# Patient Record
Sex: FEMALE | Race: WHITE | NOT HISPANIC OR LATINO | Employment: OTHER | URBAN - METROPOLITAN AREA
[De-identification: names, ages, dates, MRNs, and addresses within clinical notes are randomized per-mention and may not be internally consistent; named-entity substitution may affect disease eponyms.]

---

## 2017-01-02 ENCOUNTER — THERAPY VISIT (OUTPATIENT)
Dept: PHYSICAL THERAPY | Facility: CLINIC | Age: 82
End: 2017-01-02
Payer: MEDICARE

## 2017-01-02 DIAGNOSIS — M54.50 CHRONIC BILATERAL LOW BACK PAIN WITHOUT SCIATICA: Primary | ICD-10-CM

## 2017-01-02 DIAGNOSIS — G89.29 CHRONIC BILATERAL LOW BACK PAIN WITHOUT SCIATICA: Primary | ICD-10-CM

## 2017-01-02 PROCEDURE — 97110 THERAPEUTIC EXERCISES: CPT | Mod: GP | Performed by: PHYSICAL THERAPIST

## 2017-01-02 PROCEDURE — 97112 NEUROMUSCULAR REEDUCATION: CPT | Mod: GP | Performed by: PHYSICAL THERAPIST

## 2017-01-16 ENCOUNTER — THERAPY VISIT (OUTPATIENT)
Dept: PHYSICAL THERAPY | Facility: CLINIC | Age: 82
End: 2017-01-16
Payer: MEDICARE

## 2017-01-16 DIAGNOSIS — G89.29 CHRONIC BILATERAL LOW BACK PAIN WITHOUT SCIATICA: Primary | ICD-10-CM

## 2017-01-16 DIAGNOSIS — M54.50 CHRONIC BILATERAL LOW BACK PAIN WITHOUT SCIATICA: Primary | ICD-10-CM

## 2017-01-16 PROCEDURE — 97112 NEUROMUSCULAR REEDUCATION: CPT | Mod: GP | Performed by: PHYSICAL THERAPIST

## 2017-01-16 PROCEDURE — 97530 THERAPEUTIC ACTIVITIES: CPT | Mod: GP | Performed by: PHYSICAL THERAPIST

## 2017-01-20 ENCOUNTER — OFFICE VISIT (OUTPATIENT)
Dept: FAMILY MEDICINE | Facility: CLINIC | Age: 82
End: 2017-01-20
Payer: MEDICARE

## 2017-01-20 VITALS
DIASTOLIC BLOOD PRESSURE: 64 MMHG | BODY MASS INDEX: 34.85 KG/M2 | TEMPERATURE: 98.3 F | HEART RATE: 86 BPM | HEIGHT: 58 IN | OXYGEN SATURATION: 98 % | SYSTOLIC BLOOD PRESSURE: 122 MMHG | RESPIRATION RATE: 16 BRPM | WEIGHT: 166 LBS

## 2017-01-20 DIAGNOSIS — N30.00 ACUTE CYSTITIS WITHOUT HEMATURIA: Primary | ICD-10-CM

## 2017-01-20 LAB
ALBUMIN UR-MCNC: NEGATIVE MG/DL
APPEARANCE UR: ABNORMAL
BACTERIA #/AREA URNS HPF: ABNORMAL /HPF
BILIRUB UR QL STRIP: NEGATIVE
COLOR UR AUTO: YELLOW
GLUCOSE UR STRIP-MCNC: NEGATIVE MG/DL
HGB UR QL STRIP: ABNORMAL
KETONES UR STRIP-MCNC: NEGATIVE MG/DL
LEUKOCYTE ESTERASE UR QL STRIP: ABNORMAL
NITRATE UR QL: NEGATIVE
NON-SQ EPI CELLS #/AREA URNS LPF: ABNORMAL /LPF
PH UR STRIP: 5.5 PH (ref 5–7)
RBC #/AREA URNS AUTO: ABNORMAL /HPF (ref 0–2)
SP GR UR STRIP: 1.01 (ref 1–1.03)
URN SPEC COLLECT METH UR: ABNORMAL
UROBILINOGEN UR STRIP-ACNC: 0.2 EU/DL (ref 0.2–1)
WBC #/AREA URNS AUTO: ABNORMAL /HPF (ref 0–2)

## 2017-01-20 PROCEDURE — 87186 SC STD MICRODIL/AGAR DIL: CPT | Performed by: PHYSICIAN ASSISTANT

## 2017-01-20 PROCEDURE — 87088 URINE BACTERIA CULTURE: CPT | Performed by: PHYSICIAN ASSISTANT

## 2017-01-20 PROCEDURE — 81001 URINALYSIS AUTO W/SCOPE: CPT | Performed by: PHYSICIAN ASSISTANT

## 2017-01-20 PROCEDURE — 87086 URINE CULTURE/COLONY COUNT: CPT | Performed by: PHYSICIAN ASSISTANT

## 2017-01-20 PROCEDURE — 99213 OFFICE O/P EST LOW 20 MIN: CPT | Performed by: PHYSICIAN ASSISTANT

## 2017-01-20 RX ORDER — SULFAMETHOXAZOLE/TRIMETHOPRIM 800-160 MG
1 TABLET ORAL 2 TIMES DAILY
Qty: 20 TABLET | Refills: 0 | Status: SHIPPED
Start: 2017-01-20 | End: 2017-08-14

## 2017-01-20 NOTE — MR AVS SNAPSHOT
After Visit Summary   1/20/2017    Dimple Oviedo    MRN: [id number]           Patient Information     Date Of Birth          6/23/1933        Visit Information        Provider Department      1/20/2017 1:50 PM Ambar Browning PA-C [facility]        Today's Diagnoses     Urinary frequency    -  1     Acute cystitis without hematuria            Follow-ups after your visit        Your next 10 appointments already scheduled     Jan 30, 2017  2:00 PM   [facility] with Dalila Giordano PT   [place] [facility] ([facility]  )    [address] [address] [phone]              Who to contact     If you have questions or need follow up information about today's clinic visit or your schedule please contact [facility] directly at [phone].  Normal or non-critical lab and imaging results will be communicated to you by MyChart, letter or phone within 4 business days after the clinic has received the results. If you do not hear from us within 7 days, please contact the clinic through [employer] or phone. If you have a critical or abnormal lab result, we will notify you by phone as soon as possible.  Submit refill requests through [employer] or call your pharmacy and they will forward the refill request to us. Please allow 3 business days for your refill to be completed.          Additional Information About Your Visit        MyChart Information     [employer] gives you secure access to your electronic health record. If you see a primary care provider, you can also send messages to your care team and make appointments. If you have questions, please call your primary care clinic.  If you do not have a primary care provider, please call [phone] and they will assist you.        Care EveryWhere ID     This is your Care EveryWhere ID. This could be used by other organizations to access your [facility]  "records  [id number]        Your Vitals Were     Pulse Temperature Respirations Height BMI (Body Mass Index) Pulse Oximetry    86 98.3  F (36.8  C) (Oral) 16 4' 10\" (1.473 m) 34.70 kg/m2 98%       Blood Pressure from Last 3 Encounters:   01/20/17 122/64   11/30/16 129/53   11/09/16 138/70    Weight from Last 3 Encounters:   01/20/17 166 lb (75.297 kg)   11/09/16 167 lb 8 oz (75.978 kg)   11/02/16 164 lb (74.39 kg)              We Performed the Following     *UA reflex to Microscopic and Culture ([facility] (except [place] and [place])     Urine Microscopic          Today's Medication Changes          These changes are accurate as of: 1/20/17  2:23 PM.  If you have any questions, ask your nurse or doctor.               These medicines have changed or have updated prescriptions.        Dose/Directions    aspirin 81 MG tablet   This may have changed:  See the new instructions.   Used for:  Routine medical exam        ONE DAILY   Quantity:  100   Refills:  3                Primary Care Provider Office Phone # Fax #    Pop Antonino Zapien [phone]       [place] [phone]        Thank you!     Thank you for choosing [facility]  for your care. Our goal is always to provide you with excellent care. Hearing back from our patients is one way we can continue to improve our services. Please take a few minutes to complete the written survey that you may receive in the mail after your visit with us. Thank you!             Your Updated Medication List - Protect others around you: Learn how to safely use, store and throw away your medicines at www.disposemymeds.org.          This list is accurate as of: 1/20/17  2:23 PM.  Always use your most recent med list.                   Brand Name Dispense Instructions for use    acetaminophen 650 MG 8 hour tablet     100 tablet    Take 650 mg by mouth every 8 hours as " needed for mild pain or fever       alendronate 70 MG tablet    FOSAMAX    13 tablet    Take 1 tablet (70 mg) by mouth every 7 days Take with over 8 ounces water and stay upright for at least 30 minutes after dose.  Take at least 60 minutes before breakfast       aspirin 81 MG tablet     100    ONE DAILY       CALCIUM 500 + D 500-200 MG-IU Tabs      1 tablets 3 times daily       Fish Oil 500 MG Caps      Take 1 capsule by mouth 3 times daily       irbesartan 300 MG tablet    AVAPRO    90 tablet    Take 1 tablet (300 mg) by mouth daily       lovastatin 40 MG tablet    MEVACOR    90 tablet    Take 1 tablet (40 mg) by mouth At Bedtime       melatonin 3 MG tablet     90 tablet    Take 1 tablet (3 mg) by mouth nightly as needed for sleep       omeprazole 20 MG CR capsule    priLOSEC    180 capsule    Take 1 capsule (20 mg) by mouth daily Profile Rx: patient will contact pharmacy when needed       polyethylene glycol Packet    MIRALAX/GLYCOLAX    7 packet    Take 17 g by mouth 2 times daily as needed for constipation       prednisoLONE acetate 1 % ophthalmic susp    PRED FORTE     Place 1 drop into both eyes 2 times daily       RESTASIS 0.05 % ophthalmic emulsion   Generic drug:  cycloSPORINE      Place 1 drop into both eyes 2 times daily

## 2017-01-20 NOTE — PROGRESS NOTES
SUBJECTIVE:                                                    Dimple Oviedo is a 83 year old female who presents to clinic today for the following health issues:      URINARY TRACT SYMPTOMS      Duration: LAST NIGHT    Description  frequency, odor and urgency    Intensity:  mild    Accompanying signs and symptoms:  Fever/chills: no   Flank pain no   Nausea and vomiting: no   Vaginal symptoms: odor and itching  Abdominal/Pelvic Pain: no     History  History of frequent UTI's: YES- usually once per year  History of kidney stones: YES- about 10 years ago  Sexually Active: no   Possibility of pregnancy: No    Precipitating or alleviating factors: None    Therapies tried and outcome: increase fluid intake   Outcome: says she feels a little better         Problem list and histories reviewed & adjusted, as indicated.  Additional history: as documented    Patient Active Problem List   Diagnosis     Esophageal reflux     restless leg     heat intolerance     Goiter     Disorder of bone and cartilage     Other psoriasis     perirectal cyst     Malignant melanoma of skin of trunk, except scrotum (H)     Undiagnosed cardiac murmurs     Nontoxic multinodular goiter     Hyperlipidemia LDL goal <130     Hypertension goal BP (blood pressure) < 140/90     Urinary incontinence     Osteoarthritis of left knee     Hip arthritis     Polymyalgia rheumatica (H)     High risk medication use     Shoulder pain     Sepsis (H)     Impaired fasting blood sugar     Chronic bilateral low back pain without sciatica     Past Surgical History   Procedure Laterality Date     Surgical history of -   1996     malignant melanoma     Surgical history of -   1968     thyroid nodule     Surgical history of -        D & C     C nonspecific procedure  2005     colonoscopy polyp repeat 2010     Laparoscopic cholecystectomy with cholangiograms N/A 11/1/2015     Procedure: LAPAROSCOPIC CHOLECYSTECTOMY WITH CHOLANGIOGRAMS;  Surgeon: Tonie Warren,  MD;  Location: [facility] OR     Endoscopic ultrasound lower gastrointestional tract (gi) N/A 10/30/2015     Procedure: ENDOSCOPIC ULTRASOUND LOWER GASTROINTESTIONAL TRACT (GI);  Surgeon: Daniel Jean-Baptiste MD;  Location: [facility] OR       Social History   Substance Use Topics     Smoking status: Never Smoker      Smokeless tobacco: Never Used     Alcohol Use: No      Comment: 6 times per year-one drink     Family History   Problem Relation Age of Onset     CANCER Father      dec - esophageal and laryngeal     HEART DISEASE Mother      Respiratory Mother      dec         Current Outpatient Prescriptions   Medication Sig Dispense Refill     alendronate (FOSAMAX) 70 MG tablet Take 1 tablet (70 mg) by mouth every 7 days Take with over 8 ounces water and stay upright for at least 30 minutes after dose.  Take at least 60 minutes before breakfast 13 tablet 0     irbesartan (AVAPRO) 300 MG tablet Take 1 tablet (300 mg) by mouth daily 90 tablet 1     omeprazole (PRILOSEC) 20 MG capsule Take 1 capsule (20 mg) by mouth daily Profile Rx: patient will contact pharmacy when needed 180 capsule 3     lovastatin (MEVACOR) 40 MG tablet Take 1 tablet (40 mg) by mouth At Bedtime 90 tablet 0     acetaminophen 650 MG TABS Take 650 mg by mouth every 8 hours as needed for mild pain or fever 100 tablet 0     polyethylene glycol (MIRALAX/GLYCOLAX) packet Take 17 g by mouth 2 times daily as needed for constipation 7 packet 0     Omega-3 Fatty Acids (FISH OIL) 500 MG CAPS Take 1 capsule by mouth 3 times daily       prednisoLONE acetate (PRED FORTE) 1 % ophthalmic suspension Place 1 drop into both eyes 2 times daily       melatonin 3 MG tablet Take 1 tablet (3 mg) by mouth nightly as needed for sleep 90 tablet 0     cycloSPORINE (RESTASIS) 0.05 % ophthalmic emulsion Place 1 drop into both eyes 2 times daily       ASPIRIN 81 MG OR TABS ONE DAILY (Patient taking differently: ONE DAILY at bedtime) 100 3     CALCIUM 500 + D 500-200 MG-IU OR TABS 1 tablets 3  "times daily  0     Allergies   Allergen Reactions     No Known Drug Allergies        ROS:  C: NEGATIVE for fever, chills, change in weight  INTEGUMENTARY/SKIN: NEGATIVE for worrisome rashes, moles or lesions  E/M: NEGATIVE for sore throat, ear or sinus problems  R: NEGATIVE for cough  CV: NEGATIVE for chest pain, palpitations or peripheral edema  GI: NEGATIVE for nausea, abdominal pain, heartburn, or change in bowel habits  : POSITIVE for frequency and odor  MUSCULOSKELETAL: POSITIVE for back ache  NEURO: NEGATIVE for weakness, dizziness or paresthesias  ENDOCRINE: NEGATIVE for cold intolerance, HX diabetes and HX thyroid disease  HEME/ALLERGY/IMMUNE: NEGATIVE for swollen nodes      OBJECTIVE:                                                    /64 mmHg  Pulse 86  Temp(Src) 98.3  F (36.8  C) (Oral)  Resp 16  Ht 4' 10\" (1.473 m)  Wt 166 lb (75.297 kg)  BMI 34.70 kg/m2  SpO2 98%  Body mass index is 34.7 kg/(m^2).  GENERAL: healthy, alert and no distress  RESP: lungs clear to auscultation - no rales, rhonchi or wheezes  CV: regular rate and rhythm, normal S1 S2, no S3 or S4, no murmur, click or rub, no peripheral edema and peripheral pulses strong  ABDOMEN: soft, nontender, no hepatosplenomegaly, no masses and bowel sounds normal  MS: no gross musculoskeletal defects noted, no edema  BACK: no CVA tenderness, no paralumbar tenderness    Diagnostic Test Results:  Results for orders placed or performed in visit on 01/20/17 (from the past 24 hour(s))   *UA reflex to Microscopic and Culture ([facility] (except [place] and [place])   Result Value Ref Range    Color Urine Yellow     Appearance Urine Cloudy     Glucose Urine Negative NEG mg/dL    Bilirubin Urine Negative NEG    Ketones Urine Negative NEG mg/dL    Specific Gravity Urine 1.010 1.003 - 1.035    Blood Urine Trace (A) NEG    pH Urine 5.5 5.0 - 7.0 pH    Protein Albumin Urine Negative NEG mg/dL    Urobilinogen Urine 0.2 " 0.2 - 1.0 EU/dL    Nitrite Urine Negative NEG    Leukocyte Esterase Urine Moderate (A) NEG    Source Midstream Urine    Urine Microscopic   Result Value Ref Range    WBC Urine 25-50 (A) 0 - 2 /HPF    RBC Urine 5-10 (A) 0 - 2 /HPF    Squamous Epithelial /LPF Urine Few FEW /LPF    Bacteria Urine Few (A) NEG /HPF          ASSESSMENT/PLAN:                                                    1. Acute cystitis without hematuria  Push fluids and rest. Avoid caffeine and alcohol. Spoke about danger signs and when she should RTC.  - *UA reflex to Microscopic and Culture ([facility] (except [place] and [place])  - sulfamethoxazole-trimethoprim (BACTRIM DS/SEPTRA DS) 800-160 MG per tablet; Take 1 tablet by mouth 2 times daily for 10 days  Dispense: 20 tablet; Refill: 0  - Urine Microscopic  - Urine Culture Aerobic Bacterial    Options for treatment and follow up care were discussed with the patient. Patient participated in decision making and agrees with treatment plan.     Ambar Browning PACongC  [facility]

## 2017-01-20 NOTE — NURSING NOTE
"Chief Complaint   Patient presents with     UTI       Initial /64 mmHg  Pulse 86  Temp(Src) 98.3  F (36.8  C) (Oral)  Resp 16  Ht 4' 10\" (1.473 m)  Wt 166 lb (75.297 kg)  BMI 34.70 kg/m2  SpO2 98% Estimated body mass index is 34.7 kg/(m^2) as calculated from the following:    Height as of this encounter: 4' 10\" (1.473 m).    Weight as of this encounter: 166 lb (75.297 kg).  BP completed using cuff size: jovan Lares CMA      "

## 2017-01-21 ENCOUNTER — TELEPHONE (OUTPATIENT)
Dept: URGENT CARE | Facility: URGENT CARE | Age: 82
End: 2017-01-21

## 2017-01-21 NOTE — TELEPHONE ENCOUNTER
Pt called clinic c/o insomnia, restless legs, nausea, and chills.  Pt seems to think this is caused by the Bactrim she was prescribed for UTI on 1/20/2017.  Pt is requesting med change.  Provider notified and suggests that pt wait for culture results to come in before changing medication.  Pt understands and will call clinic for culture results tomorrow after 3 pm.  Tory Cevallos/ MA

## 2017-01-23 LAB
BACTERIA SPEC CULT: ABNORMAL
MICRO REPORT STATUS: ABNORMAL
MICROORGANISM SPEC CULT: ABNORMAL
SPECIMEN SOURCE: ABNORMAL

## 2017-01-24 ENCOUNTER — TRANSFERRED RECORDS (OUTPATIENT)
Dept: HEALTH INFORMATION MANAGEMENT | Facility: CLINIC | Age: 82
End: 2017-01-24

## 2017-01-24 LAB
ALT SERPL-CCNC: 19 U/L (ref 5–35)
AST SERPL-CCNC: 18 U/L (ref 5–34)
CREAT SERPL-MCNC: 0.92 MG/DL (ref 0.5–1.3)
GFR SERPL CREATININE-BSD FRML MDRD: 62 ML/MIN/1.73M2

## 2017-01-30 ENCOUNTER — THERAPY VISIT (OUTPATIENT)
Dept: PHYSICAL THERAPY | Facility: CLINIC | Age: 82
End: 2017-01-30
Payer: MEDICARE

## 2017-01-30 DIAGNOSIS — M54.50 CHRONIC BILATERAL LOW BACK PAIN WITHOUT SCIATICA: Primary | ICD-10-CM

## 2017-01-30 DIAGNOSIS — G89.29 CHRONIC BILATERAL LOW BACK PAIN WITHOUT SCIATICA: Primary | ICD-10-CM

## 2017-01-30 PROCEDURE — 97110 THERAPEUTIC EXERCISES: CPT | Mod: GP | Performed by: PHYSICAL THERAPIST

## 2017-01-30 PROCEDURE — 97530 THERAPEUTIC ACTIVITIES: CPT | Mod: GP | Performed by: PHYSICAL THERAPIST

## 2017-01-30 PROCEDURE — 97112 NEUROMUSCULAR REEDUCATION: CPT | Mod: GP | Performed by: PHYSICAL THERAPIST

## 2017-02-01 DIAGNOSIS — E78.5 HYPERLIPIDEMIA LDL GOAL <130: Primary | ICD-10-CM

## 2017-02-01 RX ORDER — LOVASTATIN 40 MG
40 TABLET ORAL AT BEDTIME
Qty: 90 TABLET | Refills: 2 | Status: SHIPPED
Start: 2017-02-01 | End: 2017-08-31

## 2017-02-01 NOTE — TELEPHONE ENCOUNTER
lovastatin     Last Written Prescription Date: 8/15/16  Last Fill Quantity: 90, # refills: 0  Last Office Visit with [facility], [facility] or [facility] prescribing provider: 1/20/17       CHOL      162   8/29/2016  HDL       52   8/29/2016  LDL       88   8/29/2016  TRIG      109   8/29/2016  CHOLHDLRATIO      2.5   4/28/2015

## 2017-02-02 NOTE — TELEPHONE ENCOUNTER
Prescription approved per [facility] Refill Protocol.    Signed Prescriptions:                        Disp   Refills    lovastatin (MEVACOR) 40 MG tablet          90 tab*2        Sig: Take 1 tablet (40 mg) by mouth At Bedtime  Authorizing Provider: BLAYNE MORENO  Ordering User: RADHA BUSBY      Closing encounter - no further actions needed at this time    Radha Busby RN

## 2017-02-16 ENCOUNTER — THERAPY VISIT (OUTPATIENT)
Dept: PHYSICAL THERAPY | Facility: CLINIC | Age: 82
End: 2017-02-16
Payer: MEDICARE

## 2017-02-16 DIAGNOSIS — G89.29 CHRONIC BILATERAL LOW BACK PAIN WITHOUT SCIATICA: ICD-10-CM

## 2017-02-16 DIAGNOSIS — M54.50 CHRONIC BILATERAL LOW BACK PAIN WITHOUT SCIATICA: ICD-10-CM

## 2017-02-16 PROCEDURE — G8979 MOBILITY GOAL STATUS: HCPCS | Mod: GP | Performed by: PHYSICAL THERAPIST

## 2017-02-16 PROCEDURE — 97530 THERAPEUTIC ACTIVITIES: CPT | Mod: GP | Performed by: PHYSICAL THERAPIST

## 2017-02-16 PROCEDURE — 97112 NEUROMUSCULAR REEDUCATION: CPT | Mod: GP | Performed by: PHYSICAL THERAPIST

## 2017-02-16 PROCEDURE — G8978 MOBILITY CURRENT STATUS: HCPCS | Mod: GP | Performed by: PHYSICAL THERAPIST

## 2017-02-16 NOTE — MR AVS SNAPSHOT
After Visit Summary   2/16/2017    Dimple Oviedo    MRN: [id number]           Patient Information     Date Of Birth          6/23/1933        Visit Information        Provider Department      2/16/2017 12:40 PM Dalila Giordano PT [facility]        Today's Diagnoses     Chronic bilateral low back pain without sciatica           Follow-ups after your visit        Your next 10 appointments already scheduled     Feb 22, 2017  1:40 PM CST   Office Visit with Pop Zapien MD   [facility] ([facility])    [address] [place] [address] [phone]           Bring a current list of meds and any records pertaining to this visit.  For Physicals, please bring immunization records and any forms needing to be filled out.  Please arrive 10 minutes early to complete paperwork.              Who to contact     If you have questions or need follow up information about today's clinic visit or your schedule please contact [facility] directly at [phone].  Normal or non-critical lab and imaging results will be communicated to you by [employer], letter or phone within 4 business days after the clinic has received the results. If you do not hear from us within 7 days, please contact the clinic through [email or web address] or phone. If you have a critical or abnormal lab result, we will notify you by phone as soon as possible.  Submit refill requests through [employer] or call your pharmacy and they will forward the refill request to us. Please allow 3 business days for your refill to be completed.          Additional Information About Your Visit        MyChart Information     [employer] gives you secure access to your electronic health record. If you see a primary care provider, you can also send messages to your care team and make appointments. If you have questions, please  call your primary care clinic.  If you do not have a primary care provider, please call [phone] and they will assist you.        Care EveryWhere ID     This is your Care EveryWhere ID. This could be used by other organizations to access your [facility] medical records  [id number]         Blood Pressure from Last 3 Encounters:   01/20/17 122/64   11/30/16 129/53   11/09/16 138/70    Weight from Last 3 Encounters:   01/20/17 75.3 kg (166 lb)   11/09/16 76 kg (167 lb 8 oz)   11/02/16 74.4 kg (164 lb)              We Performed the Following     [facility] Progress Notes Report     Neuromuscular Re-Education     Therapeutic Activities          Today's Medication Changes          These changes are accurate as of: 2/16/17 11:59 PM.  If you have any questions, ask your nurse or doctor.               These medicines have changed or have updated prescriptions.        Dose/Directions    aspirin 81 MG tablet   This may have changed:  See the new instructions.   Used for:  Routine medical exam        ONE DAILY   Quantity:  100   Refills:  3                Primary Care Provider Office Phone # Fax #    Pop Antonino Zapien [phone]       [address] [address]        Thank you!     Thank you for choosing [facility]  for your care. Our goal is always to provide you with excellent care. Hearing back from our patients is one way we can continue to improve our services. Please take a few minutes to complete the written survey that you may receive in the mail after your visit with us. Thank you!             Your Updated Medication List - Protect others around you: Learn how to safely use, store and throw away your medicines at www.disposemymeds.org.          This list is accurate as of: 2/16/17 11:59 PM.  Always use your most recent med list.                   Brand Name Dispense Instructions for use    acetaminophen  650 MG 8 hour tablet     100 tablet    Take 650 mg by mouth every 8 hours as needed for mild pain or fever       alendronate 70 MG tablet    FOSAMAX    13 tablet    Take 1 tablet (70 mg) by mouth every 7 days Take with over 8 ounces water and stay upright for at least 30 minutes after dose.  Take at least 60 minutes before breakfast       aspirin 81 MG tablet     100    ONE DAILY       CALCIUM 500 + D 500-200 MG-IU Tabs      1 tablets 3 times daily       Fish Oil 500 MG Caps      Take 1 capsule by mouth 3 times daily       irbesartan 300 MG tablet    AVAPRO    90 tablet    Take 1 tablet (300 mg) by mouth daily       lovastatin 40 MG tablet    MEVACOR    90 tablet    Take 1 tablet (40 mg) by mouth At Bedtime       melatonin 3 MG tablet     90 tablet    Take 1 tablet (3 mg) by mouth nightly as needed for sleep       omeprazole 20 MG CR capsule    priLOSEC    180 capsule    Take 1 capsule (20 mg) by mouth daily Profile Rx: patient will contact pharmacy when needed       polyethylene glycol Packet    MIRALAX/GLYCOLAX    7 packet    Take 17 g by mouth 2 times daily as needed for constipation       prednisoLONE acetate 1 % ophthalmic susp    PRED FORTE     Place 1 drop into both eyes 2 times daily       RESTASIS 0.05 % ophthalmic emulsion   Generic drug:  cycloSPORINE      Place 1 drop into both eyes 2 times daily

## 2017-02-19 NOTE — PROGRESS NOTES
Subjective:    HPI                    Objective:    System    Physical Exam    General     ROS    Assessment/Plan:      PROGRESS  REPORT    Progress reporting period is from 12/5/16 to 2/16/17.       SUBJECTIVE  Subjective: Was not dx with lupus as was earlier discussed, but there does seem to be something going on autoimmune wise. She will follow-up with the rheumatologist in about a month. Pt notes she has good days and bad days and does not know why. After feeling good for a few days, she walked much more than she has been and then was sore the following day. She continues to be limited with her walking distance, but the pain resolves quickly when she sits down. The exercises help her loosen up and be less painful in the morning. She has tried implementing some of the core stability strategies from PT into her daily life, but she notes that it is difficult to do. Overall, she feels better since starting PT and thinks it has been quite helpful, but she also still feels painful and limited with standing and walking.   (Pt notes the lateral R hip pain has been better. )    Current Pain level: 0/10 (while sitting. ).     Initial Pain level: 8/10.   Changes in function:  Yes (See Goal flowsheet attached for changes in current functional level)  Adverse reaction to treatment or activity: None    OBJECTIVE  Changes noted in objective findings:  Yes, see below.   Objective: Improved range of motion and core engagement. Pt requires cues for proper form with posterior pelvic tilt. Incr time and education on implementing exercise strategies into ADLs. Pt will follow up with referring MD and then return to PT for final session.  (THI improved from 31 on 12/5/16 to 22 on 1/30/17. )     ASSESSMENT/PLAN  Updated problem list and treatment plan: Diagnosis 1:  LBP - L4-L5 stenosis per MRI  Pain -  hot/cold therapy, manual therapy, self management, education and home program  Decreased ROM/flexibility - manual therapy, therapeutic  exercise and home program  Decreased joint mobility - manual therapy, therapeutic exercise and home program  Decreased strength - therapeutic exercise, therapeutic activities and home program  Decreased function - therapeutic activities and home program  Impaired posture - neuro re-education and home program  STG/LTGs have been met or progress has been made towards goals:  Yes (See Goal flow sheet completed today.)  Assessment of Progress: The patient's condition is improving.  Patient is meeting short term goals and is progressing towards long term goals.  Self Management Plans:  Patient has been instructed in a home treatment program.  Patient  has been instructed in self management of symptoms.  I have re-evaluated this patient and find that the nature, scope, duration and intensity of the therapy is appropriate for the medical condition of the patient.  Dimple continues to require the following intervention to meet STG and LTG's:  PT    Recommendations:  This patient would benefit from continued therapy.   Pt will follow up with referring MD and then return to PT for the final visit.   Frequency:  1 X a month, once daily  Duration:  for 1 months        Please refer to the daily flowsheet for treatment today, total treatment time and time spent performing 1:1 timed codes.

## 2017-02-22 ENCOUNTER — TELEPHONE (OUTPATIENT)
Dept: FAMILY MEDICINE | Facility: CLINIC | Age: 82
End: 2017-02-22

## 2017-02-22 ENCOUNTER — OFFICE VISIT (OUTPATIENT)
Dept: FAMILY MEDICINE | Facility: CLINIC | Age: 82
End: 2017-02-22
Payer: MEDICARE

## 2017-02-22 VITALS
OXYGEN SATURATION: 96 % | BODY MASS INDEX: 35.27 KG/M2 | WEIGHT: 168.75 LBS | HEART RATE: 70 BPM | DIASTOLIC BLOOD PRESSURE: 68 MMHG | TEMPERATURE: 97.7 F | SYSTOLIC BLOOD PRESSURE: 122 MMHG

## 2017-02-22 DIAGNOSIS — G89.29 CHRONIC BILATERAL LOW BACK PAIN WITHOUT SCIATICA: ICD-10-CM

## 2017-02-22 DIAGNOSIS — M54.50 CHRONIC BILATERAL LOW BACK PAIN WITHOUT SCIATICA: ICD-10-CM

## 2017-02-22 DIAGNOSIS — E66.9 OBESITY, UNSPECIFIED OBESITY SEVERITY, UNSPECIFIED OBESITY TYPE: Primary | ICD-10-CM

## 2017-02-22 PROCEDURE — 99214 OFFICE O/P EST MOD 30 MIN: CPT | Performed by: FAMILY MEDICINE

## 2017-02-22 ASSESSMENT — ANXIETY QUESTIONNAIRES
IF YOU CHECKED OFF ANY PROBLEMS ON THIS QUESTIONNAIRE, HOW DIFFICULT HAVE THESE PROBLEMS MADE IT FOR YOU TO DO YOUR WORK, TAKE CARE OF THINGS AT HOME, OR GET ALONG WITH OTHER PEOPLE: NOT DIFFICULT AT ALL
7. FEELING AFRAID AS IF SOMETHING AWFUL MIGHT HAPPEN: NOT AT ALL
GAD7 TOTAL SCORE: 1
6. BECOMING EASILY ANNOYED OR IRRITABLE: NOT AT ALL
2. NOT BEING ABLE TO STOP OR CONTROL WORRYING: SEVERAL DAYS
5. BEING SO RESTLESS THAT IT IS HARD TO SIT STILL: NOT AT ALL
1. FEELING NERVOUS, ANXIOUS, OR ON EDGE: NOT AT ALL
3. WORRYING TOO MUCH ABOUT DIFFERENT THINGS: NOT AT ALL

## 2017-02-22 ASSESSMENT — PATIENT HEALTH QUESTIONNAIRE - PHQ9: 5. POOR APPETITE OR OVEREATING: NOT AT ALL

## 2017-02-22 NOTE — PROGRESS NOTES
SUBJECTIVE:                                                    Dimple Oviedo is a 83 year old female who presents to clinic today for the following health issues:     Musculoskeletal problem/pain      Duration: few years    Description  Location: back and hip    Intensity:  Comes and go. 5/10    Accompanying signs and symptoms: sharp pain comes and go; when walking or standing    History  Previous similar problem: no   Previous evaluation:  MRI    Precipitating or alleviating factors:  Trauma or overuse: YES  Aggravating factors include: sitting and walking    Therapies tried and outcome: heat and acetaminophen and therapy.       Seen by spine specialist and also rheumatologist. Wondering if she needs to see them both.  Had jasmine abnormal labs with rheumatologist and she is going to see them for follow up but not for next few months.    Regarding pain - Physical therapist and patient noticed worsening of pain with sugar intake. Patient is not quite sure about association though.  She admits she is sometime not compliant with diet. She has done weight weight watchers for her life.     Problem list and histories reviewed & adjusted, as indicated.  Additional history: as documented    Problem list, Medication list, Allergies, and Medical/Social/Surgical histories reviewed in EPIC and updated as appropriate.      Social History     Social History     Marital status:      Spouse name:      Number of children: 2     Years of education: N/A     Occupational History      Retired     Social History Main Topics     Smoking status: Never Smoker     Smokeless tobacco: Never Used     Alcohol use No      Comment: 6 times per year-one drink     Drug use: No     Sexual activity: Yes     Partners: Male     Other Topics Concern      Service No     Blood Transfusions No     Exercise Yes     curves     Seat Belt Yes     Self-Exams Yes     Parent/Sibling W/ Cabg, Mi Or Angioplasty Before 65f 55m? No     Social  History Narrative    December 7, 2009    Balanced Diet - Yes    Osteoporosis Prevention Measures - Dairy servings per day: 2 and Medication/Supplements (See current meds)    Regular Exercise -  Yes Describe curves, walking    Dental Exam - YES - Date: 10/2009    Eye Exam - YES - Date: 2008    Self Breast Exam - Yes    Abuse: Current or Past (Physical, Sexual or Emotional)- No    Do you feel safe in your environment - Yes    Guns stored in the home - No    Sunscreen used - Yes    Seatbelts used - Yes    Lipids -  YES - Date: 11/24/2009    Glucose -  YES - Date: 11/24/2009    Colon Cancer Screening - Colonoscopy 11/18/2005(date completed)    Hemoccults - YES - Date: 10/12/2004    Pap Test -  YES - Date: 09/22/2004    Do you have any concerns about STD's -  No    Mammography - YES - Date: 11/24/2009    DEXA - YES - Date: 11/20/2008    Immunizations reviewed and up to date - Yes    PAULETTE Spencer CMA                 Allergies   Allergen Reactions     No Known Drug Allergies      Patient Active Problem List   Diagnosis     Esophageal reflux     restless leg     heat intolerance     Goiter     Disorder of bone and cartilage     Other psoriasis     perirectal cyst     Malignant melanoma of skin of trunk, except scrotum (H)     Undiagnosed cardiac murmurs     Nontoxic multinodular goiter     Hyperlipidemia LDL goal <130     Hypertension goal BP (blood pressure) < 140/90     Urinary incontinence     Osteoarthritis of left knee     Hip arthritis     Polymyalgia rheumatica (H)     High risk medication use     Shoulder pain     Sepsis (H)     Impaired fasting blood sugar     Chronic bilateral low back pain without sciatica     Obesity, unspecified obesity severity, unspecified obesity type     Reviewed medications, social history and  past medical and surgical history.    Review of system: for general, respiratory, CVS, GI and psychiatry negative except for noted above.     EXAM:  /68 (BP Location: Left arm, Patient Position:  Chair, Cuff Size: Adult Regular)  Pulse 70  Temp 97.7  F (36.5  C) (Oral)  Wt 168 lb 12 oz (76.5 kg)  SpO2 96%  BMI 35.27 kg/m2  Constitutional: healthy, alert and no distress   Psychiatric: mentation appears normal and affect normal/bright     ASSESSMENT / PLAN:  (E66.9) Obesity, unspecified obesity severity, unspecified obesity type  (primary encounter diagnosis)  Comment: Body mass index is 35.27 kg/(m^2). certainly would benefit from seeing a dietician. I got a staff message from physical therapist about worsening of her back pain and sugar intake and physical therapist reccommended nutrition referral too. Referral signed. She is not quit sure if she is going to follow up on that but will keep referral handy.  Plan: NUTRITION REFERRAL             (M54.5,  G89.29) Chronic bilateral low back pain without sciatica  Comment:  See above. Seeing spine specialist and rheumatologist. Follow up scheduled.    Plan: NUTRITION REFERRAL

## 2017-02-22 NOTE — NURSING NOTE
"Chief Complaint   Patient presents with     Arthritis       Initial /68 (BP Location: Left arm, Patient Position: Chair, Cuff Size: Adult Regular)  Pulse 70  Temp 97.7  F (36.5  C) (Oral)  Wt 168 lb 12 oz (76.5 kg)  SpO2 96%  BMI 35.27 kg/m2 Estimated body mass index is 35.27 kg/(m^2) as calculated from the following:    Height as of 1/20/17: 4' 10\" (1.473 m).    Weight as of this encounter: 168 lb 12 oz (76.5 kg).  Medication Reconciliation: complete   Joel Zurita MA    "

## 2017-02-22 NOTE — MR AVS SNAPSHOT
After Visit Summary   2/22/2017    Dimple Oviedo    MRN: [id number]           Patient Information     Date Of Birth          6/23/1933        Visit Information        Provider Department      2/22/2017 1:40 PM Pop Zapien MD [facility]        Today's Diagnoses     Obesity, unspecified obesity severity, unspecified obesity type    -  1    Chronic bilateral low back pain without sciatica           Follow-ups after your visit        Additional Services     NUTRITION REFERRAL       Your provider has referred you to: [facility]: [facility] (on call location)  [facility] [phone]   [email or web address]    Please be aware that coverage of these services is subject to the terms and limitations of your health insurance plan.  Call member services at your health plan with any benefit or coverage questions.      Please bring the following with you to your appointment:    (1) This referral request  (2) Any documents given to you regarding this referral  (3) Any specific questions you have about diet and/or food choices                  Who to contact     If you have questions or need follow up information about today's clinic visit or your schedule please contact [facility] directly at [phone].  Normal or non-critical lab and imaging results will be communicated to you by MyChart, letter or phone within 4 business days after the clinic has received the results. If you do not hear from us within 7 days, please contact the clinic through MyChart or phone. If you have a critical or abnormal lab result, we will notify you by phone as soon as possible.  Submit refill requests through [employer] or call your pharmacy and they will forward the refill request to us. Please allow 3 business days for your refill to be completed.          Additional Information About Your Visit        MyChart Information     [employer] gives you secure  access to your electronic health record. If you see a primary care provider, you can also send messages to your care team and make appointments. If you have questions, please call your primary care clinic.  If you do not have a primary care provider, please call [phone] and they will assist you.        Care EveryWhere ID     This is your Care EveryWhere ID. This could be used by other organizations to access your [place] medical records  [id number]        Your Vitals Were     Pulse Temperature Pulse Oximetry BMI (Body Mass Index)          70 97.7  F (36.5  C) (Oral) 96% 35.27 kg/m2         Blood Pressure from Last 3 Encounters:   02/22/17 122/68   01/20/17 122/64   11/30/16 129/53    Weight from Last 3 Encounters:   02/22/17 168 lb 12 oz (76.5 kg)   01/20/17 166 lb (75.3 kg)   11/09/16 167 lb 8 oz (76 kg)              We Performed the Following     NUTRITION REFERRAL          Today's Medication Changes          These changes are accurate as of: 2/22/17  2:09 PM.  If you have any questions, ask your nurse or doctor.               These medicines have changed or have updated prescriptions.        Dose/Directions    aspirin 81 MG tablet   This may have changed:  See the new instructions.   Used for:  Routine medical exam        ONE DAILY   Quantity:  100   Refills:  3                Primary Care Provider Office Phone # Fax #    Pop Antonino Zapien [phone]       [address] [address]        Thank you!     Thank you for choosing [facility]  for your care. Our goal is always to provide you with excellent care. Hearing back from our patients is one way we can continue to improve our services. Please take a few minutes to complete the written survey that you may receive in the mail after your visit with us. Thank you!             Your Updated Medication List - Protect others around you: Learn how to safely use, store and throw away  your medicines at www.disposemymeds.org.          This list is accurate as of: 2/22/17  2:09 PM.  Always use your most recent med list.                   Brand Name Dispense Instructions for use    acetaminophen 650 MG 8 hour tablet     100 tablet    Take 650 mg by mouth every 8 hours as needed for mild pain or fever       alendronate 70 MG tablet    FOSAMAX    13 tablet    Take 1 tablet (70 mg) by mouth every 7 days Take with over 8 ounces water and stay upright for at least 30 minutes after dose.  Take at least 60 minutes before breakfast       aspirin 81 MG tablet     100    ONE DAILY       CALCIUM 500 + D 500-200 MG-IU Tabs      1 tablets 3 times daily       Fish Oil 500 MG Caps      Take 1 capsule by mouth 3 times daily       irbesartan 300 MG tablet    AVAPRO    90 tablet    Take 1 tablet (300 mg) by mouth daily       lovastatin 40 MG tablet    MEVACOR    90 tablet    Take 1 tablet (40 mg) by mouth At Bedtime       melatonin 3 MG tablet     90 tablet    Take 1 tablet (3 mg) by mouth nightly as needed for sleep       omeprazole 20 MG CR capsule    priLOSEC    180 capsule    Take 1 capsule (20 mg) by mouth daily Profile Rx: patient will contact pharmacy when needed       polyethylene glycol Packet    MIRALAX/GLYCOLAX    7 packet    Take 17 g by mouth 2 times daily as needed for constipation       prednisoLONE acetate 1 % ophthalmic susp    PRED FORTE     Place 1 drop into both eyes 2 times daily       RESTASIS 0.05 % ophthalmic emulsion   Generic drug:  cycloSPORINE      Place 1 drop into both eyes 2 times daily

## 2017-02-22 NOTE — TELEPHONE ENCOUNTER
"----- Message from Shavon Bonilla MD sent at 2/22/2017 10:10 AM CST -----  Regarding: FW: Return vist  Please see recommendations from PT, below, for dietician referral. Please place this consultation order, if you feel appropriate.    ThanksShavon      ----- Message -----     From: Dalila Giordano, PT     Sent: 2/19/2017   5:07 PM       To: Shavon Bonilla MD  Subject: Return vist                                      Dr. Bonilla,     I have been working with Dimple in PT for 7 visits for her LBP. She has improved, but continues to have pain with standing and walking. Dimple and I have discussed that PT will not \"cure\" the stenosis, but she should continue the exercises to manage the pain and the importance of using the stability concepts with the exercises in her daily life. Her Oswestry score has improved from a 31 to a 22.  I have asked her to return to see you to discuss continued management strategies. On your initial order, you mentioned the potential for other medication or an injection. I would like to suggest the possibility of a consult with a dietician as well. I know Dimple likes her sweets and I think her sugar intake may be adding to pain flare-ups. Please refer to EPIC for a full progress note. Please let me know if you have any questions.     Thanks,     Dalila Giordano DPT  [facility]  [phone]    "

## 2017-02-23 ASSESSMENT — PATIENT HEALTH QUESTIONNAIRE - PHQ9: SUM OF ALL RESPONSES TO PHQ QUESTIONS 1-9: 2

## 2017-02-23 ASSESSMENT — ANXIETY QUESTIONNAIRES: GAD7 TOTAL SCORE: 1

## 2017-03-08 ENCOUNTER — OFFICE VISIT (OUTPATIENT)
Dept: PALLIATIVE MEDICINE | Facility: CLINIC | Age: 82
End: 2017-03-08
Payer: MEDICARE

## 2017-03-08 VITALS
HEART RATE: 70 BPM | SYSTOLIC BLOOD PRESSURE: 158 MMHG | DIASTOLIC BLOOD PRESSURE: 68 MMHG | BODY MASS INDEX: 35.11 KG/M2 | RESPIRATION RATE: 18 BRPM | WEIGHT: 168 LBS

## 2017-03-08 DIAGNOSIS — M54.50 CHRONIC LUMBOSACRAL PAIN: Primary | ICD-10-CM

## 2017-03-08 DIAGNOSIS — G89.29 CHRONIC LUMBOSACRAL PAIN: Primary | ICD-10-CM

## 2017-03-08 PROCEDURE — 99214 OFFICE O/P EST MOD 30 MIN: CPT | Performed by: PAIN MEDICINE

## 2017-03-08 ASSESSMENT — PAIN SCALES - GENERAL: PAINLEVEL: NO PAIN (1)

## 2017-03-08 NOTE — NURSING NOTE
"Chief Complaint   Patient presents with     Pain       Initial /68  Pulse 70  Resp 18  Wt 76.2 kg (168 lb)  BMI 35.11 kg/m2 Estimated body mass index is 35.11 kg/(m^2) as calculated from the following:    Height as of 1/20/17: 1.473 m (4' 10\").    Weight as of this encounter: 76.2 kg (168 lb).  Medication Reconciliation: complete       Mario Drew MA  [facility]      "

## 2017-03-08 NOTE — PATIENT INSTRUCTIONS
"PATIENT INSTRUCTIONS:  1. We discussed your recent clinic visit with your rheumatologist. Please complete follow up visit with your clinic.  2. Regarding use of over-the-counter medications:  A) May consider as needed use of \"NSAID\" medications - these include things like Advil (Ibuprofen) or Aleve (Naproxen).   For Advil (Ibuprofen), do not take more than 600-800 mg every 6-8 hours as needed. MAX 1800 mg per day. Take with food/beverage. This can cause some stomach irritation, effects on heart or kidneys.   For Aleve (Naproxen), do not take more than 500 mg every 12 hours as needed. MAX 1000 mg per day. Take with food/beverage. This can cause some stomach irritation, effects on heart or kidneys.  B) May consider as needed use of Tylenol (Acetaminophen).   For Tylenol (Acetaminophen), do not take more than 500-1000 mg every 4-6 hours as needed. MAX 2000 mg per day. This can cause effects on liver.   2. Continue your home therapy program.   3. We talked about possible spinal injection. You wanted to hold off on this right now, since your pain has been somewhat better controlled recently.   4. Return to clinic as needed. If you decide you would like injection, you can call the clinic and we can order this directly for you.   Thank you!  Scheduling number to the [facility]: [phone]. Call this number to schedule or change appointments.    Nurse Triage line: [phone]  Call this number with any questions or concerns. You can leave a message anytime. Please leave a detailed message. Calls are returned between 8 AM and 4 PM Monday through Thursday and from 8 AM to 12 Noon on Fridays. We usually get back to you within 24 to 48 hours depending on the issue/request.     We believe regular attendance is key to your success in our program.    Any time you are unable to keep your appointment we ask that you call us at least 24 hours in advance to let us know. This will allow us to offer the " appointment time to another patient.

## 2017-03-16 DIAGNOSIS — M85.80 OSTEOPENIA: ICD-10-CM

## 2017-03-16 DIAGNOSIS — Z79.899 HIGH RISK MEDICATION USE: ICD-10-CM

## 2017-03-16 DIAGNOSIS — I10 ESSENTIAL HYPERTENSION WITH GOAL BLOOD PRESSURE LESS THAN 140/90: ICD-10-CM

## 2017-03-16 RX ORDER — ALENDRONATE SODIUM 70 MG/1
TABLET ORAL
Qty: 12 TABLET | Refills: 1 | Status: SHIPPED | OUTPATIENT
Start: 2017-03-16 | End: 2017-09-27

## 2017-03-16 NOTE — TELEPHONE ENCOUNTER
alendronate (FOSAMAX) 70 MG tablet    Last Written Prescription Date: 11/19/16  Last Fill Quantity: 13, # refills: 0  Last Office Visit with [facility] or [facility] prescribing provider: 2/22/17       DEXA Scan:  Last order of DX HIP/PELVIS/SPINE was found on 11/10/2016 from Office Visit on 11/9/2016     Last order of DX HIP/PELVIS/SPINE W LAT FRACTION ANALYSIS was found on 11/15/2016 from [facility] Appointment on 11/15/2016       Creatinine   Date Value Ref Range Status   01/24/2017 0.920 0.500 - 1.300 mg/dL Final       irbesartan (AVAPRO) 300 MG tablet      Last Written Prescription Date: 11/8/16  Last Fill Quantity: 90, # refills: 1  Last Office Visit with [facility] or [facility] prescribing provider: 2/22/17       Potassium   Date Value Ref Range Status   11/09/2016 4.5 3.4 - 5.3 mmol/L Final     Creatinine   Date Value Ref Range Status   01/24/2017 0.920 0.500 - 1.300 mg/dL Final     BP Readings from Last 3 Encounters:   03/08/17 158/68   02/22/17 122/68   01/20/17 122/64

## 2017-03-16 NOTE — TELEPHONE ENCOUNTER
Routing refill request to provider for review/approval because:  BP elevated    Fosamax refilled according to [facility] refill protocol.    Routing to PCP.    Dr. Zapien-Please sign if agree.    Thank you!  ITALO Figueroa, SHASHANKN, RN

## 2017-03-17 RX ORDER — IRBESARTAN 300 MG/1
TABLET ORAL
Qty: 90 TABLET | Refills: 1 | Status: SHIPPED | OUTPATIENT
Start: 2017-03-17 | End: 2017-08-22

## 2017-03-23 NOTE — PROGRESS NOTES
"[facility]  Medical Spine Speciality - Follow-up Visit    Date of visit: 03/08/17    Primary Care Provider: Dr. Pop Zapien    Interval History: Dimple Oviedo is an 83-year-old female who returns to clinic for follow up of low back pain. Since her last clinic visit on 11/30/16, she reports that her pain is \"much better\" overall. She notes that her pain \"comes and goes\"; she notes that she is \"in a good part right now\". She is uncertain if this is just the natural course of her symptoms, or if this is related to anything she is doing for her pain. Today she describes a discomfort of the lumbosacral region, most pronounced in the morning, from 0/10 to 5/10 in intensity, worse with prolonged standing or walking (e.g. shopping). Her pain is improved with use of heat and Tylenol Arthritis. She reports use of Aleve PM to help her sleep better. She has had a cough and cold-like symptoms over the last week(s). She is not interested in additional medications at this time. She states that she held off on trial of topical agents. Her activity level is improved overall. She completed a course of physical therapy and continues to do exercises on her own; she \"suppose[s] it's helping\".    Of note, she completed consultation with Dr. Miles Lazo at [facility] on 01/24/17. Per Dr. 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 She does not have symptoms to strongly suggest an underlying autoimmune disease despite the positive CANDIS...recommendations to continue regular low-impact exercises, use good shoes inside and outside the home, try glucosamine/chondrite and for at least 2-3 months in adequate doses, and try increasing the Tylenol Arthritis to as much as 2 tablets twice daily as needed.\" She completed additional laboratory testing at that time. She states she was supposed to " "follow up in clinic regarding her results.     Past Pain History (from initial consultation, 16):  Dimple Oviedo is an 83-year-old female who presents for Medical Spine Clinic consultation at the referral of Dr. Pop Zapien for initial evaluation of chief complaint of low back pain.     She reports onset of pain around 2015, as she was at the airport [place] way to visit her daughter in [place] for the holidays. She states that she had set her food down and got up to walk a few feet to get something, when she experienced acute right gluteal pain as if \"lightening hit my hip...almost threw me to the floor\". She noted \"terrible pain\" as she attempted to get in and out of the car later on. She was seen at an urgent care center in [place], at which time x-ray was taken. She states that she was told that a \"muscle exploded\". She was given medications and advised to rest. She notes that her pain gradually improved over 4 months. She notes only occasional right gluteal pain, for example when lifting something.     Over the last month, she has noted pain across the lumbosacral region, equal on both sides. She describes an intermittent (daily) nagging and sharp pain, from 1/10 to 8/10 in intensity, worse with prolonged standing (e.g. standing for 30 minutes dishing out potatoes at a ), standing after a period of time sitting, or with lifting. Her pain is improved somewhat with use of heat. She has not tried ice. She denies radicular pain, numbness, or paresthesias. She reports sense of leg weakness, although denies foot drag/drop, tripping, or falls. She gets \"charley horses\" in the legs at night. She reports some swelling of the legs, which she notes is hereditary. She denies saddle anesthesia. She reports baseline urinary leakage, for which she wears a pad; she was recommended to undergo bladder sling for this. She denies bowel incontinence, although is constipated at baseline. Her " history is otherwise significant for arthritis of the hands and knees. She notes some hand tingling when doing a lot of embroidery, although she denies neck pain or upper extremity symptoms. She has a diagnosis of polymyalgia rheumatica, for which she is followed by Dr. Vincenzo Tovar. She has been treated with prednisone in the past, although states that her symptoms (shoulder/hip girdle weakness) have been improved/stable.     She reports some relief from use of Tylenol Arthritis 1 tab QD PRN; she takes this in the morning only if she is having a busy day. She takes Aspirin 81 mg before bed. She denies other medications for pain. She denies physical therapy or water therapy. She denies injection or surgery for her back.    Review of Electronic Chart: Today I have also reviewed available medical information in the patient's medical record at [facility] including relevant provider notes, laboratory work, and imaging.     Review of [place] Prescription Monitoring Program (): Today I have also reviewed the patient's history of controlled substance use, as provided by [place] licensed pharmacies and prescriber dispensers.     Past Medical History:  Past Medical History:   Diagnosis Date     Calculus of kidney      Esophageal reflux      Hyperlipidemia LDL goal <130 5/9/2010     Malignant melanoma of skin of trunk, except scrotum (H)      Nonspecific abnormal finding     has living will 2004 -      Nontoxic multinodular goiter     no further eval /tx rec per pt     Osteopenia      Other psoriasis      Personal history of colonic polyps      PMR (polymyalgia rheumatica) (H)      Undiagnosed cardiac murmurs      Unspecified constipation      Unspecified essential hypertension        Past Surgical History:  Past Surgical History:   Procedure Laterality Date     C NONSPECIFIC PROCEDURE  2005    colonoscopy polyp repeat 2010     ENDOSCOPIC ULTRASOUND LOWER GASTROINTESTIONAL TRACT (GI) N/A 10/30/2015    Procedure:  "ENDOSCOPIC ULTRASOUND LOWER GASTROINTESTIONAL TRACT (GI);  Surgeon: Daniel Jean-Baptiste MD;  Location: [facility] OR     LAPAROSCOPIC CHOLECYSTECTOMY WITH CHOLANGIOGRAMS N/A 11/1/2015    Procedure: LAPAROSCOPIC CHOLECYSTECTOMY WITH CHOLANGIOGRAMS;  Surgeon: Tonie Warren MD;  Location: [facility] OR     SURGICAL HISTORY OF -   1996    malignant melanoma     SURGICAL HISTORY OF -   1968    thyroid nodule     SURGICAL HISTORY OF -       D & C       Medications:  Current Outpatient Prescriptions   Medication Sig Dispense Refill     lovastatin (MEVACOR) 40 MG tablet Take 1 tablet (40 mg) by mouth At Bedtime 90 tablet 2     omeprazole (PRILOSEC) 20 MG capsule Take 1 capsule (20 mg) by mouth daily Profile Rx: patient will contact pharmacy when needed 180 capsule 3     acetaminophen 650 MG TABS Take 650 mg by mouth every 8 hours as needed for mild pain or fever 100 tablet 0     polyethylene glycol (MIRALAX/GLYCOLAX) packet Take 17 g by mouth 2 times daily as needed for constipation 7 packet 0     Omega-3 Fatty Acids (FISH OIL) 500 MG CAPS Take 1 capsule by mouth 3 times daily       prednisoLONE acetate (PRED FORTE) 1 % ophthalmic suspension Place 1 drop into both eyes 2 times daily       melatonin 3 MG tablet Take 1 tablet (3 mg) by mouth nightly as needed for sleep 90 tablet 0     cycloSPORINE (RESTASIS) 0.05 % ophthalmic emulsion Place 1 drop into both eyes 2 times daily       ASPIRIN 81 MG OR TABS ONE DAILY (Patient taking differently: ONE DAILY at bedtime) 100 3     CALCIUM 500 + D 500-200 MG-IU OR TABS 1 tablets 3 times daily  0     irbesartan (AVAPRO) 300 MG tablet TAKE 1 TABLET EVERY DAY 90 tablet 1     alendronate (FOSAMAX) 70 MG tablet TAKE 1 TABLET EVERY 7 DAYS AT LEAST 60 MIN BEFORE BREAKFAST AS DIRECTED \"SEE PACKAGE FOR ADDITIONAL INSTRUCTIONS\" 12 tablet 1       Allergies:  Allergies   Allergen Reactions     No Known Drug Allergies        Family history:  Family History   Problem Relation Age of Onset     CANCER " Father      dec - esophageal and laryngeal     HEART DISEASE Mother      Respiratory Mother      dec       Social History: As above, otherwise unchanged.     Review of Systems: As above. A 12-point review of systems was significant for recent cough and shortness of breath in setting of a cold-like illness, urinary frequency and urgency.     Physical Examination:  /68  Pulse 70  Resp 18  Wt 76.2 kg (168 lb)  BMI 35.11 kg/m2  Constitutional: Well developed, well nourished, appears stated age.  HEENT: Head atraumatic, normocephalic. Eyes without conjunctival injection.  CV/Resp: Symmetric chest wall excursion. Non-labored breathing. No audible wheezing.   Skin: No new rash or lesions of exposed skin.    Extremities: Bilateral lower extremity edema, with use of compression socks. Distal extremities warm/well perfused.    Psychiatric/mental status: Alert, without lethargy or stupor. Pleasant and appropriately conversant. Mood stable.  Neuro/Musculoskeletal exam: No new functional deficits appreciated.     Laboratory studies:  01/24/17 CANDIS 9: centromere Ab 5, chromatin 1090, dsDNA 5, RNP/SM 7, Scl-70 6, SM 11, SS-A 16, SS-B 2, ssDNA 13  01/24/17 CANDIS Scr A 2, CANDIS Scr B 63, complement C4 33, complement C3 147, ESR 30, CRP 0.170  01/24/17 CBC wnl (except Hct 35.9), ALB 4.5, ALT 19, AST 18, Creatinine 0.920, GFR 62    Assessment: Dimple Oviedo is an 83-year-old female who presents with intermittent lumbosacral pain, as well as history of right gluteal pain. 11/18/16 MRI lumbar spine demonstrates multilevel degenerative changes, including advanced degenerative disc disease and facet arthropathy at L5-S1. There is mild spondylolisthesis at L1-2 and L4-5. Central stenosis is moderate at L4-5. At L5-S1, neural foraminal stenosis is moderate on the right and severe on the left, with possible irritation of the left S1 nerve root. Biomechanical factors, including lumbar curvature and pelvic obliquity, may also be  contributing. She is noted to have SI joint tenderness on the right. Her history is otherwise significant for polymyalgia rheumatica, and she was noted to have elevated CANDIS. Per rheumatology, her presentation is less consistent with underlying autoimmune disease.    Recommendations:  The following recommendations were given to the patient. Diagnosis, treatment options, risks, benefits, and alternatives were discussed, and all questions were answered. The patient expressed understanding of the plan for management.      The patient recently completed consultation with Dr. Miles Lazo at [facility]. We discussed Dr. Lazo's assessment and recommendations. She was encouraged to follow up with her rheumatologist as directed (e.g. to discuss recent laboratory results).    Dosing limits of simple analgesics were discussed. If perceived to be beneficial, she may continue use of Tylenol 500-1000 mg Q4-6 hours PRN, not to exceed 2000 mg per day. She was cautioned to avoid taking more than one NSAID (e.g. she should choose either Ibuprofen or Naproxen). We discussed the fact that use of Ibuprofen should not exceed 600-800 mg Q6-8 hours PRN, max 1800 mg per day, to be taken with food/beverage. Use of Naproxen should not exceed more than 500 mg Q12 hours PRN, max 1000 mg per day, to be taken with food/beverage. Risks/side effects of these medications were reviewed.     She recently completed course of physical therapy. We discussed the importance of a routine home exercise program.    We discussed possible spinal injection in the future. Since her pain has been recently improved, she expressed preference to hold off on this.    She is welcome to return to clinic as needed. Should she wish to pursue injection in the future, she is welcome to contact the clinic to schedule this directly.    Total time spent was 25 minutes. Greater than 50% of face-to-face time was spent in patient counseling  and/or coordination of care.     Shavon Bonilla MD  Medical Spine Specialist

## 2017-04-05 ENCOUNTER — TRANSFERRED RECORDS (OUTPATIENT)
Dept: HEALTH INFORMATION MANAGEMENT | Facility: CLINIC | Age: 82
End: 2017-04-05

## 2017-07-31 ENCOUNTER — OFFICE VISIT (OUTPATIENT)
Dept: URGENT CARE | Facility: URGENT CARE | Age: 82
End: 2017-07-31
Payer: MEDICARE

## 2017-07-31 VITALS
SYSTOLIC BLOOD PRESSURE: 142 MMHG | WEIGHT: 163 LBS | BODY MASS INDEX: 34.22 KG/M2 | DIASTOLIC BLOOD PRESSURE: 80 MMHG | HEART RATE: 80 BPM | TEMPERATURE: 97.9 F | OXYGEN SATURATION: 97 % | HEIGHT: 58 IN
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
## 2017-07-31 DIAGNOSIS — J06.9 VIRAL URI: ICD-10-CM

## 2017-07-31 PROCEDURE — 99213 OFFICE O/P EST LOW 20 MIN: CPT | Performed by: NURSE PRACTITIONER

## 2017-07-31 NOTE — MR AVS SNAPSHOT
"              After Visit Summary   7/31/2017    Dimple Oviedo    MRN: [id number]           Patient Information     Date Of Birth          6/23/1933        Visit Information        Provider Department      7/31/2017 5:30 PM Marilyn Giordano NP [facility]        Today's Diagnoses     Bug bites, initial encounter    -  1    Viral URI           Follow-ups after your visit        Who to contact     If you have questions or need follow up information about today's clinic visit or your schedule please contact [facility] directly at [phone].  Normal or non-critical lab and imaging results will be communicated to you by [email or web address], letter or phone within 4 business days after the clinic has received the results. If you do not hear from us within 7 days, please contact the clinic through [email or web address] or phone. If you have a critical or abnormal lab result, we will notify you by phone as soon as possible.  Submit refill requests through [employer] or call your pharmacy and they will forward the refill request to us. Please allow 3 business days for your refill to be completed.          Additional Information About Your Visit        MyChart Information     [employer] gives you secure access to your electronic health record. If you see a primary care provider, you can also send messages to your care team and make appointments. If you have questions, please call your primary care clinic.  If you do not have a primary care provider, please call [phone] and they will assist you.        Care EveryWhere ID     This is your Care EveryWhere ID. This could be used by other organizations to access your [facility] medical records  [id number]        Your Vitals Were     Pulse Temperature Height Pulse Oximetry BMI (Body Mass Index)       80 97.9  F (36.6  C) (Oral) 4' 10\" (1.473 m) 97% 34.07 kg/m2        Blood Pressure from Last 3 Encounters:   07/31/17 142/80   03/08/17 158/68   02/22/17 122/68 "    Weight from Last 3 Encounters:   07/31/17 163 lb (73.9 kg)   03/08/17 168 lb (76.2 kg)   02/22/17 168 lb 12 oz (76.5 kg)              Today, you had the following     No orders found for display         Today's Medication Changes          These changes are accurate as of: 7/31/17  8:02 PM.  If you have any questions, ask your nurse or doctor.               These medicines have changed or have updated prescriptions.        Dose/Directions    aspirin 81 MG tablet   This may have changed:  See the new instructions.   Used for:  Routine medical exam        ONE DAILY   Quantity:  100   Refills:  3                Primary Care Provider Office Phone # Fax #    Pop Antonino Zapien [phone]       [place] [address]        Equal Access to Services     GUNNER RODRIGUEZ : Aleja Pollack, wasyed barnard, qaybta kaalmada saira, maldonado seay . So [facility] [phone].    ATENCIÓN: Si habla español, tiene a sierra disposición servicios gratuitos de asistencia lingüística. Al al [phone].    We comply with applicable federal civil rights laws and [place] laws. We do not discriminate on the basis of race, color, national origin, age, disability sex, sexual orientation or gender identity.            Thank you!     Thank you for choosing [facility]  for your care. Our goal is always to provide you with excellent care. Hearing back from our patients is one way we can continue to improve our services. Please take a few minutes to complete the written survey that you may receive in the mail after your visit with us. Thank you!             Your Updated Medication List - Protect others around you: Learn how to safely use, store and throw away your medicines at www.disposemymeds.org.          This list is accurate as of: 7/31/17  8:02 PM.  Always use your most recent med list.                   Brand  "Name Dispense Instructions for use Diagnosis    acetaminophen 650 MG 8 hour tablet     100 tablet    Take 650 mg by mouth every 8 hours as needed for mild pain or fever    Ascending cholangitis       alendronate 70 MG tablet    FOSAMAX    12 tablet    TAKE 1 TABLET EVERY 7 DAYS AT LEAST 60 MIN BEFORE BREAKFAST AS DIRECTED \"SEE PACKAGE FOR ADDITIONAL INSTRUCTIONS\"    High risk medication use, Osteopenia       aspirin 81 MG tablet     100    ONE DAILY    Routine medical exam       CALCIUM 500 + D 500-200 MG-IU Tabs      1 tablets 3 times daily        Fish Oil 500 MG Caps      Take 1 capsule by mouth 3 times daily        irbesartan 300 MG tablet    AVAPRO    90 tablet    TAKE 1 TABLET EVERY DAY    Essential hypertension with goal blood pressure less than 140/90       lovastatin 40 MG tablet    MEVACOR    90 tablet    Take 1 tablet (40 mg) by mouth At Bedtime    Hyperlipidemia LDL goal <130       melatonin 3 MG tablet     90 tablet    Take 1 tablet (3 mg) by mouth nightly as needed for sleep    Insomnia       omeprazole 20 MG CR capsule    priLOSEC    180 capsule    Take 1 capsule (20 mg) by mouth daily Profile Rx: patient will contact pharmacy when needed    Gastroesophageal reflux disease without esophagitis       polyethylene glycol Packet    MIRALAX/GLYCOLAX    7 packet    Take 17 g by mouth 2 times daily as needed for constipation    Other constipation       prednisoLONE acetate 1 % ophthalmic susp    PRED FORTE     Place 1 drop into both eyes 2 times daily        RESTASIS 0.05 % ophthalmic emulsion   Generic drug:  cycloSPORINE      Place 1 drop into both eyes 2 times daily          "

## 2017-07-31 NOTE — NURSING NOTE
"Dimple Oviedo;   Chief Complaint   Patient presents with     URI     onset 5-6 days ago      Insect Bites     insect bites on right arm, was at son's house and concerned about tick bites      Urgent Care     Initial /80 (BP Location: Right arm, Patient Position: Chair, Cuff Size: Adult Large)  Pulse 80  Temp 97.9  F (36.6  C) (Oral)  Ht 4' 10\" (1.473 m)  Wt 163 lb (73.9 kg)  SpO2 97%  BMI 34.07 kg/m2 Estimated body mass index is 34.07 kg/(m^2) as calculated from the following:    Height as of this encounter: 4' 10\" (1.473 m).    Weight as of this encounter: 163 lb (73.9 kg)..  BP completed using cuff size large.  Kayleen Castillo R.N.  "

## 2017-08-01 NOTE — PROGRESS NOTES
"HPI  Pt c/o bug bites to her right FA that she noticed over the weekend. She wants to make sure they are not tick bites. She denies fever, streaking, drainage or bulls eye rash appearance. + itches. She also notes that she has a cold. She was coughing on Wednesday and Thursday, but s/s have largely resolved. Cough is nonproductive. Denies wheezing, cp, dizziness. Denies other URI s/s.     ROS  10 point ROS assessed, documented in HPI otherwise negative.    Physical Exam   Constitutional: She is oriented to person, place, and time and well-developed, well-nourished, and in no distress. No distress.   HENT:   Head: Normocephalic.   Right Ear: External ear normal.   Left Ear: External ear normal.   Nose: Nose normal.   Mouth/Throat: Oropharynx is clear and moist. No oropharyngeal exudate.   Eyes: Conjunctivae are normal. Pupils are equal, round, and reactive to light. Right eye exhibits no discharge. Left eye exhibits no discharge.   Neck: Neck supple.   Cardiovascular: Normal rate.    Pulmonary/Chest: Effort normal and breath sounds normal. No respiratory distress. She has no wheezes. She has no rales.   Neurological: She is alert and oriented to person, place, and time. GCS score is 15.   Skin: Skin is warm and dry. She is not diaphoretic.   Four 0.5cm circular erythematous spots on right FA. No drainage, streaking, induration or fluctuance.    Psychiatric: Affect and judgment normal.           /80 (BP Location: Right arm, Patient Position: Chair, Cuff Size: Adult Large)  Pulse 80  Temp 97.9  F (36.6  C) (Oral)  Ht 4' 10\" (1.473 m)  Wt 163 lb (73.9 kg)  SpO2 97%  BMI 34.07 kg/m2      MDM:  Skin exam not consistent with erythema migrans. No infection notes. Can use OTC Benadryl topical and hydrocortisone prn. My index of suspicion for PNA is low as lungs CTAB, she looks well and VSS. She is getting much better. We discussed s/s that warrant urgent re-evaluation. Pt is comfortable with plan. "       Dx:  Viral URI  Bug bites      Marilyn Giordano, CNP

## 2017-08-14 ENCOUNTER — OFFICE VISIT (OUTPATIENT)
Dept: FAMILY MEDICINE | Facility: CLINIC | Age: 82
End: 2017-08-14
Payer: MEDICARE

## 2017-08-14 VITALS
BODY MASS INDEX: 35.58 KG/M2 | OXYGEN SATURATION: 96 % | SYSTOLIC BLOOD PRESSURE: 124 MMHG | TEMPERATURE: 98.5 F | DIASTOLIC BLOOD PRESSURE: 60 MMHG | HEART RATE: 80 BPM | HEIGHT: 58 IN | WEIGHT: 169.5 LBS

## 2017-08-14 DIAGNOSIS — I10 HYPERTENSION GOAL BP (BLOOD PRESSURE) < 140/90: ICD-10-CM

## 2017-08-14 DIAGNOSIS — R31.29 MICROSCOPIC HEMATURIA: ICD-10-CM

## 2017-08-14 DIAGNOSIS — N30.01 ACUTE CYSTITIS WITH HEMATURIA: Primary | ICD-10-CM

## 2017-08-14 LAB
ALBUMIN UR-MCNC: NEGATIVE MG/DL
APPEARANCE UR: ABNORMAL
BACTERIA #/AREA URNS HPF: ABNORMAL /HPF
BILIRUB UR QL STRIP: NEGATIVE
COLOR UR AUTO: YELLOW
GLUCOSE UR STRIP-MCNC: NEGATIVE MG/DL
HGB UR QL STRIP: ABNORMAL
KETONES UR STRIP-MCNC: NEGATIVE MG/DL
LEUKOCYTE ESTERASE UR QL STRIP: ABNORMAL
MUCOUS THREADS #/AREA URNS LPF: PRESENT /LPF
NITRATE UR QL: NEGATIVE
NON-SQ EPI CELLS #/AREA URNS LPF: ABNORMAL /LPF
PH UR STRIP: 5.5 PH (ref 5–7)
RBC #/AREA URNS AUTO: ABNORMAL /HPF (ref 0–2)
SP GR UR STRIP: 1.01 (ref 1–1.03)
URN SPEC COLLECT METH UR: ABNORMAL
UROBILINOGEN UR STRIP-ACNC: 0.2 EU/DL (ref 0.2–1)
WBC #/AREA URNS AUTO: ABNORMAL /HPF (ref 0–2)

## 2017-08-14 PROCEDURE — 87086 URINE CULTURE/COLONY COUNT: CPT | Performed by: FAMILY MEDICINE

## 2017-08-14 PROCEDURE — 87186 SC STD MICRODIL/AGAR DIL: CPT | Performed by: FAMILY MEDICINE

## 2017-08-14 PROCEDURE — 99214 OFFICE O/P EST MOD 30 MIN: CPT | Performed by: FAMILY MEDICINE

## 2017-08-14 PROCEDURE — 81001 URINALYSIS AUTO W/SCOPE: CPT | Performed by: FAMILY MEDICINE

## 2017-08-14 PROCEDURE — 87088 URINE BACTERIA CULTURE: CPT | Performed by: FAMILY MEDICINE

## 2017-08-14 RX ORDER — SULFAMETHOXAZOLE/TRIMETHOPRIM 800-160 MG
1 TABLET ORAL 2 TIMES DAILY
Qty: 20 TABLET | Refills: 0 | Status: SHIPPED | OUTPATIENT
Start: 2017-08-14 | End: 2017-10-06

## 2017-08-14 NOTE — MR AVS SNAPSHOT
After Visit Summary   8/14/2017    Dimple Oviedo    MRN: [id number]           Patient Information     Date Of Birth          6/23/1933        Visit Information        Provider Department      8/14/2017 10:20 AM Bel Multani MD [facility]        Today's Diagnoses     Acute cystitis with hematuria    -  1    Dysuria        Hypertension goal BP (blood pressure) < 140/90          Care Instructions    1) Start the bactrim antibiotic. We will let you know when the urine culture returns and if a change in antibiotic should be made.  2) Let us know if you are not feeling better in the next few days.   3) Return in about a month for a repeat urine test to make sure the blood in your urine goes away when the infection is gone.           Follow-ups after your visit        Who to contact     If you have questions or need follow up information about today's clinic visit or your schedule please contact [facility] directly at [phone].  Normal or non-critical lab and imaging results will be communicated to you by TruckTrackhart, letter or phone within 4 business days after the clinic has received the results. If you do not hear from us within 7 days, please contact the clinic through ServiceMesht or phone. If you have a critical or abnormal lab result, we will notify you by phone as soon as possible.  Submit refill requests through Petco or call your pharmacy and they will forward the refill request to us. Please allow 3 business days for your refill to be completed.          Additional Information About Your Visit        MyChart Information     Petco gives you secure access to your electronic health record. If you see a primary care provider, you can also send messages to your care team and make appointments. If you have questions, please call your primary care clinic.  If you do not have a primary care provider, please call [phone] and they will assist you.        Care  "EveryWhere ID     This is your Care EveryWhere ID. This could be used by other organizations to access your [place] medical records  [id number]        Your Vitals Were     Pulse Temperature Height Pulse Oximetry BMI (Body Mass Index)       80 98.5  F (36.9  C) (Oral) 4' 10\" (1.473 m) 96% 35.43 kg/m2        Blood Pressure from Last 3 Encounters:   08/14/17 124/60   07/31/17 142/80   03/08/17 158/68    Weight from Last 3 Encounters:   08/14/17 169 lb 8 oz (76.9 kg)   07/31/17 163 lb (73.9 kg)   03/08/17 168 lb (76.2 kg)              We Performed the Following     UA with Microscopic reflex to Culture          Today's Medication Changes          These changes are accurate as of: 8/14/17 10:59 AM.  If you have any questions, ask your nurse or doctor.               Start taking these medicines.        Dose/Directions    sulfamethoxazole-trimethoprim 800-160 MG per tablet   Commonly known as:  BACTRIM DS/SEPTRA DS   Used for:  Acute cystitis with hematuria   Started by:  Bel Multani MD        Dose:  1 tablet   Take 1 tablet by mouth 2 times daily   Quantity:  20 tablet   Refills:  0         These medicines have changed or have updated prescriptions.        Dose/Directions    aspirin 81 MG tablet   This may have changed:  See the new instructions.   Used for:  Routine medical exam        ONE DAILY   Quantity:  100   Refills:  3            Where to get your medicines      These medications were sent to [facility] [address] [place] [address] AT [address] [address]    Hours:  24-hours Phone:  [phone]     sulfamethoxazole-trimethoprim 800-160 MG per tablet                Primary Care Provider Office Phone # Fax #    Pop Zapien [phone] [address] [place] [id number]        Equal Access to Services     GUNNER RODRIGUEZ AH: Aleja Pollack, nadia barnard, henrique rueda " "maldonado chávez. So [facility] [phone].    ATENCIÓN: Si soni alanis, tiene a sierra disposición servicios gratuitos de asistencia lingüística. Al al [phone].    We comply with applicable federal civil rights laws and [place] laws. We do not discriminate on the basis of race, color, national origin, age, disability sex, sexual orientation or gender identity.            Thank you!     Thank you for choosing [facility]  for your care. Our goal is always to provide you with excellent care. Hearing back from our patients is one way we can continue to improve our services. Please take a few minutes to complete the written survey that you may receive in the mail after your visit with us. Thank you!             Your Updated Medication List - Protect others around you: Learn how to safely use, store and throw away your medicines at www.disposemymeds.org.          This list is accurate as of: 8/14/17 10:59 AM.  Always use your most recent med list.                   Brand Name Dispense Instructions for use Diagnosis    acetaminophen 650 MG 8 hour tablet     100 tablet    Take 650 mg by mouth every 8 hours as needed for mild pain or fever    Ascending cholangitis       alendronate 70 MG tablet    FOSAMAX    12 tablet    TAKE 1 TABLET EVERY 7 DAYS AT LEAST 60 MIN BEFORE BREAKFAST AS DIRECTED \"SEE PACKAGE FOR ADDITIONAL INSTRUCTIONS\"    High risk medication use, Osteopenia       aspirin 81 MG tablet     100    ONE DAILY    Routine medical exam       CALCIUM 500 + D 500-200 MG-IU Tabs      1 tablets 3 times daily        Fish Oil 500 MG Caps      Take 1 capsule by mouth 3 times daily        irbesartan 300 MG tablet    AVAPRO    90 tablet    TAKE 1 TABLET EVERY DAY    Essential hypertension with goal blood pressure less than 140/90       lovastatin 40 MG tablet    MEVACOR    90 tablet    Take 1 tablet (40 mg) by mouth At Bedtime    Hyperlipidemia LDL goal <130       " melatonin 3 MG tablet     90 tablet    Take 1 tablet (3 mg) by mouth nightly as needed for sleep    Insomnia       omeprazole 20 MG CR capsule    priLOSEC    180 capsule    Take 1 capsule (20 mg) by mouth daily Profile Rx: patient will contact pharmacy when needed    Gastroesophageal reflux disease without esophagitis       polyethylene glycol Packet    MIRALAX/GLYCOLAX    7 packet    Take 17 g by mouth 2 times daily as needed for constipation    Other constipation       prednisoLONE acetate 1 % ophthalmic susp    PRED FORTE     Place 1 drop into both eyes 2 times daily        RESTASIS 0.05 % ophthalmic emulsion   Generic drug:  cycloSPORINE      Place 1 drop into both eyes 2 times daily        sulfamethoxazole-trimethoprim 800-160 MG per tablet    BACTRIM DS/SEPTRA DS    20 tablet    Take 1 tablet by mouth 2 times daily    Acute cystitis with hematuria

## 2017-08-14 NOTE — NURSING NOTE
"Chief Complaint   Patient presents with     UTI       Initial /60 (Cuff Size: Adult Regular)  Pulse 80  Temp 98.5  F (36.9  C) (Oral)  Ht 4' 10\" (1.473 m)  Wt 169 lb 8 oz (76.9 kg)  SpO2 96%  BMI 35.43 kg/m2 Estimated body mass index is 35.43 kg/(m^2) as calculated from the following:    Height as of this encounter: 4' 10\" (1.473 m).    Weight as of this encounter: 169 lb 8 oz (76.9 kg).  Medication Reconciliation: complete     Amberly De León, CMA      "

## 2017-08-14 NOTE — PATIENT INSTRUCTIONS
1) Start the bactrim antibiotic. We will let you know when the urine culture returns and if a change in antibiotic should be made.  2) Let us know if you are not feeling better in the next few days.   3) Return in about a month for a repeat urine test to make sure the blood in your urine goes away when the infection is gone.

## 2017-08-14 NOTE — PROGRESS NOTES
SUBJECTIVE:                                                    Dimple Oviedo is a 84 year old female who presents to clinic today for the following health issues:    URINARY TRACT SYMPTOMS      Duration: 3:00am this morning     Description  Cloudy urine, states she has frequent UTI's and knows her sx     Intensity:  mild    Accompanying signs and symptoms:  Fever/chills: no   Flank pain no   Nausea and vomiting: no   Vaginal symptoms: none  Abdominal/Pelvic Pain: no     History  History of frequent UTI's: YES  History of kidney stones: YES- 15 years ago   Sexually Active: no   Possibility of pregnancy: No    Precipitating or alleviating factors: None    Therapies tried and outcome: increase fluid intake         This morning at 0300 she had to rush to the bathroom and noticed that her urine was very cloudy. She has has UTIs in the past and knows this is how they begin: she normally takes Ciprofloxacin or Bactrim (this was her last prescribed antibiotic for UTI). She denies any dysuria. She did have some urgency. She feels malaise, but denies fever and back pain. She did increase her fluid intake.            Problem list and histories reviewed & adjusted, as indicated.  Additional history: as documented    Patient Active Problem List   Diagnosis     Esophageal reflux     restless leg     heat intolerance     Goiter     Disorder of bone and cartilage     Other psoriasis     perirectal cyst     Malignant melanoma of skin of trunk, except scrotum (H)     Undiagnosed cardiac murmurs     Nontoxic multinodular goiter     Hyperlipidemia LDL goal <130     Hypertension goal BP (blood pressure) < 140/90     Urinary incontinence     Osteoarthritis of left knee     Hip arthritis     Polymyalgia rheumatica (H)     High risk medication use     Shoulder pain     Sepsis (H)     Impaired fasting blood sugar     Chronic bilateral low back pain without sciatica     Obesity, unspecified obesity severity, unspecified obesity type      "Past Surgical History:   Procedure Laterality Date     C NONSPECIFIC PROCEDURE  2005    colonoscopy polyp repeat 2010     ENDOSCOPIC ULTRASOUND LOWER GASTROINTESTIONAL TRACT (GI) N/A 10/30/2015    Procedure: ENDOSCOPIC ULTRASOUND LOWER GASTROINTESTIONAL TRACT (GI);  Surgeon: Daniel Jean-Baptiste MD;  Location: [facility] OR     LAPAROSCOPIC CHOLECYSTECTOMY WITH CHOLANGIOGRAMS N/A 11/1/2015    Procedure: LAPAROSCOPIC CHOLECYSTECTOMY WITH CHOLANGIOGRAMS;  Surgeon: Tonie Warren MD;  Location: [facility] OR     SURGICAL HISTORY OF -   1996    malignant melanoma     SURGICAL HISTORY OF -   1968    thyroid nodule     SURGICAL HISTORY OF -       D & C       Social History   Substance Use Topics     Smoking status: Never Smoker     Smokeless tobacco: Never Used     Alcohol use No      Comment: 6 times per year-one drink     Family History   Problem Relation Age of Onset     CANCER Father      dec - esophageal and laryngeal     HEART DISEASE Mother      Respiratory Mother      dec         Current Outpatient Prescriptions   Medication Sig Dispense Refill     sulfamethoxazole-trimethoprim (BACTRIM DS/SEPTRA DS) 800-160 MG per tablet Take 1 tablet by mouth 2 times daily 20 tablet 0     irbesartan (AVAPRO) 300 MG tablet TAKE 1 TABLET EVERY DAY 90 tablet 1     alendronate (FOSAMAX) 70 MG tablet TAKE 1 TABLET EVERY 7 DAYS AT LEAST 60 MIN BEFORE BREAKFAST AS DIRECTED \"SEE PACKAGE FOR ADDITIONAL INSTRUCTIONS\" 12 tablet 1     lovastatin (MEVACOR) 40 MG tablet Take 1 tablet (40 mg) by mouth At Bedtime 90 tablet 2     omeprazole (PRILOSEC) 20 MG capsule Take 1 capsule (20 mg) by mouth daily Profile Rx: patient will contact pharmacy when needed 180 capsule 3     acetaminophen 650 MG TABS Take 650 mg by mouth every 8 hours as needed for mild pain or fever 100 tablet 0     polyethylene glycol (MIRALAX/GLYCOLAX) packet Take 17 g by mouth 2 times daily as needed for constipation 7 packet 0     Omega-3 Fatty Acids (FISH OIL) 500 MG CAPS Take 1 " capsule by mouth 3 times daily       ASPIRIN 81 MG OR TABS ONE DAILY (Patient taking differently: ONE DAILY at bedtime) 100 3     CALCIUM 500 + D 500-200 MG-IU OR TABS 1 tablets 3 times daily  0     prednisoLONE acetate (PRED FORTE) 1 % ophthalmic suspension Place 1 drop into both eyes 2 times daily       melatonin 3 MG tablet Take 1 tablet (3 mg) by mouth nightly as needed for sleep (Patient not taking: Reported on 8/14/2017) 90 tablet 0     cycloSPORINE (RESTASIS) 0.05 % ophthalmic emulsion Place 1 drop into both eyes 2 times daily       Allergies   Allergen Reactions     No Known Drug Allergies      Recent Labs   Lab Test 01/24/17 11/09/16   1439  08/29/16   0855  04/18/16   0905  02/24/16   0928  11/18/15   1134  11/09/15   1505   04/28/15   0934   02/18/14   0925   03/08/12   0854  11/30/11   0956  08/31/11   1546   A1C   --    --    --    --   5.8  6.4*   --    --    --    --    --    --   5.9   --    --    LDL   --    --   88   --    --    --    --    --   67   --   94   < >  103   --    --    HDL   --    --   52   --    --    --    --    --   64   --   46*   < >  51   --    --    TRIG   --    --   109   --    --    --    --    --   161*   --   150   < >  88   --    --    ALT  19  22   --    --    --    --   50   < >  27   < >  26   < >  30  22  25   CR  0.920  0.76   --   0.73   --    --   0.83   < >  0.82   < >  0.72   < >  0.72  0.73  0.79   GFRESTIMATED  62  73   --   77   --    --   66   < >  67   < >  78   < >  78  77  70   GFRESTBLACK   --   88   --   >90   GFR Calc     --    --   80   < >  81   < >  >90   < >  >90  >90  85   POTASSIUM   --   4.5   --   4.2   --    --   4.3   < >  4.5   < >  5.2   < >  4.8  4.2  4.7   TSH   --    --    --    --    --    --    --    --    --    --    --    --    --   0.43  0.52    < > = values in this interval not displayed.      BP Readings from Last 3 Encounters:   08/14/17 124/60   07/31/17 142/80   03/08/17 158/68    Wt Readings from Last 3  "Encounters:   08/14/17 76.9 kg (169 lb 8 oz)   07/31/17 73.9 kg (163 lb)   03/08/17 76.2 kg (168 lb)           Reviewed and updated as needed this visit by clinical staff  Reviewed and updated as needed this visit by Provider    ROS:  See above    This document serves as a record of the services and decisions personally performed and made by Bel Multani MD. It was created on her behalf by Inés Cano, a trained medical scribe. The creation of this document is based the provider's statements to the medical scribes.    Scribe Inés Cano, August 14, 2017    OBJECTIVE:     /60 (Cuff Size: Adult Regular)  Pulse 80  Temp 98.5  F (36.9  C) (Oral)  Ht 1.473 m (4' 10\")  Wt 76.9 kg (169 lb 8 oz)  SpO2 96%  BMI 35.43 kg/m2  Body mass index is 35.43 kg/(m^2).  /60 (Cuff Size: Adult Regular)  Pulse 80  Temp 98.5  F (36.9  C) (Oral)  Ht 4' 10\" (1.473 m)  Wt 169 lb 8 oz (76.9 kg)  SpO2 96%  BMI 35.43 kg/m2    Gen: alert, no acute distress  Eyes: anicteric, normal lids and conjunctiva; PERRL  ENT: OP normal  NECK: no masses, no thyromegaly appreciated  Resp: CTAB, normal respiratory effort  CV: Regular rate and rhythm, no MGR, no peripheral edema  ABD; soft, nontender, no appreciable masses or hepatosplenomegaly  Psych: A and O x 3, appropriate affect     Diagnostic Test Results:  Results for orders placed or performed in visit on 08/14/17 (from the past 24 hour(s))   UA with Microscopic reflex to Culture   Result Value Ref Range    Color Urine Yellow     Appearance Urine Slightly Cloudy     Glucose Urine Negative NEG mg/dL    Bilirubin Urine Negative NEG    Ketones Urine Negative NEG mg/dL    Specific Gravity Urine 1.010 1.003 - 1.035    pH Urine 5.5 5.0 - 7.0 pH    Protein Albumin Urine Negative NEG mg/dL    Urobilinogen Urine 0.2 0.2 - 1.0 EU/dL    Nitrite Urine Negative NEG    Blood Urine Small (A) NEG    Leukocyte Esterase Urine Small (A) NEG    Source Midstream Urine     " WBC Urine  (A) 0 - 2 /HPF    RBC Urine 2-5 (A) 0 - 2 /HPF    Squamous Epithelial /LPF Urine Few FEW /LPF    Bacteria Urine Many (A) NEG /HPF    Mucous Urine Present (A) NEG /LPF     Results for orders placed or performed in visit on 08/14/17   UA with Microscopic reflex to Culture   Result Value Ref Range    Color Urine Yellow     Appearance Urine Slightly Cloudy     Glucose Urine Negative NEG mg/dL    Bilirubin Urine Negative NEG    Ketones Urine Negative NEG mg/dL    Specific Gravity Urine 1.010 1.003 - 1.035    pH Urine 5.5 5.0 - 7.0 pH    Protein Albumin Urine Negative NEG mg/dL    Urobilinogen Urine 0.2 0.2 - 1.0 EU/dL    Nitrite Urine Negative NEG    Blood Urine Small (A) NEG    Leukocyte Esterase Urine Small (A) NEG    Source Midstream Urine     WBC Urine  (A) 0 - 2 /HPF    RBC Urine 2-5 (A) 0 - 2 /HPF    Squamous Epithelial /LPF Urine Few FEW /LPF    Bacteria Urine Many (A) NEG /HPF    Mucous Urine Present (A) NEG /LPF        ASSESSMENT/PLAN:     1. Acute cystitis with hematuria  Possible UTI with positive LE and WBCs on UA. (also few RBCs) UTI symptoms including urgency and cloudy urine.  History of E. Coli susceptible to bactrim. Will start bactrim. Pt reports it has taken longer than 3 days for UTI sx to clear up prevoiusly and requested longer rx. Discussed with her if sx resolve after 3 days, she can stop the antibiotic at any time. She agrees to call with worsening or persistent symptoms. Will let her know when UC returns.   - sulfamethoxazole-trimethoprim (BACTRIM DS/SEPTRA DS) 800-160 MG per tablet; Take 1 tablet by mouth 2 times daily  Dispense: 20 tablet; Refill: 0    2. Microscopic hematuria  Will need to recheck UA when she is feeling better to make sure hematuria resolves      3. Hypertension goal BP (blood pressure) < 140/90  Controlled. Continues on 300 mg Irbesartan.       Patient Instructions   1) Start the bactrim antibiotic. We will let you know when the urine culture returns  and if a change in antibiotic should be made.  2) Let us know if you are not feeling better in the next few days.   3) Return in about a month for a repeat urine test to make sure the blood in your urine goes away when the infection is gone.       The information in this document, created by the medical scribe for me, accurately reflects the services I personally performed and the decisions made by me. I have reviewed and approved this document for accuracy. 08/14/17    Bel Multani MD  [facility]

## 2017-08-16 LAB
AMIKACIN SUSC ISLT: <=2 UG/ML
AMPICILLIN SUSC ISLT: 4 UG/ML
AMPICILLIN+SULBAC SUSC ISLT: <=2 UG/ML
B-LACTAMASE EXTENDED SUSC ISLT: NEGATIVE
BACTERIA SPEC CULT: ABNORMAL
BACTERIA SPEC CULT: ABNORMAL
CEFAZOLIN SUSC ISLT: <=4 UG/ML
CEFEPIME SUSC ISLT: <=1 UG/ML
CEFTAZIDIME SUSC ISLT: <=1 UG/ML
CEFTRIAXONE SUSC ISLT: <=1 UG/ML
CIPROFLOXACIN SUSC ISLT: <=0.25 UG/ML
GENTAMICIN SUSC ISLT: <=1 UG/ML
LEVOFLOXACIN SUSC ISLT: <=0.12 UG/ML
MEROPENEM SUSC ISLT: <=0.25 UG/ML
PIP+TAZO SUSC ISLT: <=4 UG/ML
SPECIMEN SOURCE: ABNORMAL
TMP SMX SUSC ISLT: ABNORMAL UG/ML
TOBRAMYCIN SUSC ISLT: <=1 UG/ML

## 2017-08-22 DIAGNOSIS — I10 ESSENTIAL HYPERTENSION WITH GOAL BLOOD PRESSURE LESS THAN 140/90: ICD-10-CM

## 2017-08-22 NOTE — TELEPHONE ENCOUNTER
Medication Detail      Disp Refills Start End PENNIE   irbesartan (AVAPRO) 300 MG tablet 90 tablet 1 3/17/2017  No   Sig: TAKE 1 TABLET EVERY DAY       Last Office Visit with [facility], [facility] or [facility] prescribing provider: 8/14/17       Potassium   Date Value Ref Range Status   11/09/2016 4.5 3.4 - 5.3 mmol/L Final     Creatinine   Date Value Ref Range Status   01/24/2017 0.920 0.500 - 1.300 mg/dL Final     BP Readings from Last 3 Encounters:   08/14/17 124/60   07/31/17 142/80   03/08/17 158/68

## 2017-08-24 RX ORDER — IRBESARTAN 300 MG/1
TABLET ORAL
Qty: 90 TABLET | Refills: 0 | Status: SHIPPED | OUTPATIENT
Start: 2017-08-24 | End: 2018-01-20

## 2017-08-31 DIAGNOSIS — E78.5 HYPERLIPIDEMIA LDL GOAL <130: ICD-10-CM

## 2017-08-31 NOTE — TELEPHONE ENCOUNTER
lovastatin (MEVACOR) 40 MG tablet     Last Written Prescription Date: 02/01/17  Last Fill Quantity: 90, # refills: 2  Last Office Visit with [facility], [facility] or [facility] prescribing provider: 08/14/17       Lab Results   Component Value Date    CHOL 162 08/29/2016     Lab Results   Component Value Date    HDL 52 08/29/2016     Lab Results   Component Value Date    LDL 88 08/29/2016     Lab Results   Component Value Date    TRIG 109 08/29/2016     Lab Results   Component Value Date    CHOLHDLRATIO 2.5 04/28/2015

## 2017-09-05 ENCOUNTER — DOCUMENTATION ONLY (OUTPATIENT)
Dept: FAMILY MEDICINE | Facility: CLINIC | Age: 82
End: 2017-09-05

## 2017-09-05 RX ORDER — LOVASTATIN 40 MG
TABLET ORAL
Qty: 90 TABLET | Refills: 0 | Status: SHIPPED | OUTPATIENT
Start: 2017-09-05 | End: 2017-11-06

## 2017-09-25 DIAGNOSIS — E78.5 HYPERLIPIDEMIA LDL GOAL <130: ICD-10-CM

## 2017-09-25 LAB
CHOLEST SERPL-MCNC: 183 MG/DL
HDLC SERPL-MCNC: 57 MG/DL
LDLC SERPL CALC-MCNC: 95 MG/DL
NONHDLC SERPL-MCNC: 126 MG/DL
TRIGL SERPL-MCNC: 155 MG/DL

## 2017-09-25 PROCEDURE — 80061 LIPID PANEL: CPT | Performed by: FAMILY MEDICINE

## 2017-09-25 PROCEDURE — 36415 COLL VENOUS BLD VENIPUNCTURE: CPT | Performed by: FAMILY MEDICINE

## 2017-09-27 DIAGNOSIS — M85.80 OSTEOPENIA: ICD-10-CM

## 2017-09-27 DIAGNOSIS — Z79.899 HIGH RISK MEDICATION USE: ICD-10-CM

## 2017-09-27 NOTE — TELEPHONE ENCOUNTER
Reason for Call:  Medication or medication refill:    Do you use a [facility]?  Name of the pharmacy and phone number for the current request:  [facility]    Name of the medication requested: alendronate     Other request: patient will be going out of town in a week or two, and wants it mailed to her soon.  Please call when done or if you have any questions.    Can we leave a detailed message on this number? YES    Phone number patient can be reached at: Home number on file [phone] (home)    Call taken on 9/27/2017 at 10:02 AM by Devi Parish

## 2017-09-27 NOTE — TELEPHONE ENCOUNTER
"Medication Detail      Disp Refills Start End PENNIE   alendronate (FOSAMAX) 70 MG tablet 12 tablet 1 3/16/2017  No   Sig: TAKE 1 TABLET EVERY 7 DAYS AT LEAST 60 MIN BEFORE BREAKFAST AS DIRECTED \"SEE PACKAGE FOR ADDITIONAL INSTRUCTIONS\"       Last Office Visit with [facility], [facility] or [facility] prescribing provider: 8/14/17       DEXA Scan:  Last order of DX HIP/PELVIS/SPINE was found on 11/10/2016 from Office Visit on 11/9/2016     Last order of DX HIP/PELVIS/SPINE W LAT FRACTION ANALYSIS was found on 11/15/2016 from [facility] Appointment on 11/15/2016       Creatinine   Date Value Ref Range Status   01/24/2017 0.920 0.500 - 1.300 mg/dL Final         "

## 2017-09-28 RX ORDER — ALENDRONATE SODIUM 70 MG/1
TABLET ORAL
Qty: 12 TABLET | Refills: 0 | Status: SHIPPED | OUTPATIENT
Start: 2017-09-28 | End: 2017-12-13

## 2017-09-28 NOTE — TELEPHONE ENCOUNTER
Prescription approved per [facility] Refill Protocol.  MERE Carreon, RN          alendronate (FOSAMAX) 70 MG tablet    Last Written Prescription Date: 3/16/17  Last Fill Quantity: 12, # refills: 1  Last Office Visit with [facility], [facility] or [facility] prescribing provider: 8/14/17 with Dr. Multani       DEXA Scan:  Last order of DX HIP/PELVIS/SPINE was found on 11/10/2016 from Office Visit on 11/9/2016     Last order of DX HIP/PELVIS/SPINE W LAT FRACTION ANALYSIS was found on 11/15/2016 from [facility] Appointment on 11/15/2016       Creatinine   Date Value Ref Range Status   01/24/2017 0.920 0.500 - 1.300 mg/dL Final

## 2017-09-29 RX ORDER — ALENDRONATE SODIUM 70 MG/1
TABLET ORAL
Qty: 12 TABLET | Refills: 2 | Status: SHIPPED | OUTPATIENT
Start: 2017-09-29 | End: 2018-04-30

## 2017-10-06 ENCOUNTER — TELEPHONE (OUTPATIENT)
Dept: FAMILY MEDICINE | Facility: CLINIC | Age: 82
End: 2017-10-06

## 2017-10-06 ENCOUNTER — OFFICE VISIT (OUTPATIENT)
Dept: FAMILY MEDICINE | Facility: CLINIC | Age: 82
End: 2017-10-06
Payer: MEDICARE

## 2017-10-06 VITALS
SYSTOLIC BLOOD PRESSURE: 138 MMHG | HEART RATE: 70 BPM | HEIGHT: 58 IN | BODY MASS INDEX: 35.74 KG/M2 | DIASTOLIC BLOOD PRESSURE: 68 MMHG | WEIGHT: 170.25 LBS | OXYGEN SATURATION: 98 % | TEMPERATURE: 98 F

## 2017-10-06 DIAGNOSIS — M89.9 DISORDER OF BONE AND CARTILAGE: ICD-10-CM

## 2017-10-06 DIAGNOSIS — M54.50 CHRONIC BILATERAL LOW BACK PAIN WITHOUT SCIATICA: ICD-10-CM

## 2017-10-06 DIAGNOSIS — R73.01 IMPAIRED FASTING BLOOD SUGAR: ICD-10-CM

## 2017-10-06 DIAGNOSIS — G89.29 CHRONIC BILATERAL LOW BACK PAIN WITHOUT SCIATICA: ICD-10-CM

## 2017-10-06 DIAGNOSIS — M35.3 POLYMYALGIA RHEUMATICA (H): ICD-10-CM

## 2017-10-06 DIAGNOSIS — E04.2 NONTOXIC MULTINODULAR GOITER: ICD-10-CM

## 2017-10-06 DIAGNOSIS — M94.9 DISORDER OF BONE AND CARTILAGE: ICD-10-CM

## 2017-10-06 DIAGNOSIS — R53.83 OTHER FATIGUE: Primary | ICD-10-CM

## 2017-10-06 LAB
CRP SERPL-MCNC: <2.9 MG/L (ref 0–8)
ERYTHROCYTE [DISTWIDTH] IN BLOOD BY AUTOMATED COUNT: 14.6 % (ref 10–15)
ERYTHROCYTE [SEDIMENTATION RATE] IN BLOOD BY WESTERGREN METHOD: 25 MM/H (ref 0–30)
HCT VFR BLD AUTO: 36 % (ref 35–47)
HGB BLD-MCNC: 11 G/DL (ref 11.7–15.7)
MCH RBC QN AUTO: 27.2 PG (ref 26.5–33)
MCHC RBC AUTO-ENTMCNC: 30.6 G/DL (ref 31.5–36.5)
MCV RBC AUTO: 89 FL (ref 78–100)
PLATELET # BLD AUTO: 354 10E9/L (ref 150–450)
RBC # BLD AUTO: 4.05 10E12/L (ref 3.8–5.2)
VIT B12 SERPL-MCNC: 387 PG/ML (ref 193–986)
WBC # BLD AUTO: 5.9 10E9/L (ref 4–11)

## 2017-10-06 PROCEDURE — 82607 VITAMIN B-12: CPT | Performed by: FAMILY MEDICINE

## 2017-10-06 PROCEDURE — 86140 C-REACTIVE PROTEIN: CPT | Performed by: FAMILY MEDICINE

## 2017-10-06 PROCEDURE — 84439 ASSAY OF FREE THYROXINE: CPT | Performed by: FAMILY MEDICINE

## 2017-10-06 PROCEDURE — 36415 COLL VENOUS BLD VENIPUNCTURE: CPT | Performed by: FAMILY MEDICINE

## 2017-10-06 PROCEDURE — 85027 COMPLETE CBC AUTOMATED: CPT | Performed by: FAMILY MEDICINE

## 2017-10-06 PROCEDURE — 99214 OFFICE O/P EST MOD 30 MIN: CPT | Performed by: FAMILY MEDICINE

## 2017-10-06 PROCEDURE — 85652 RBC SED RATE AUTOMATED: CPT | Performed by: FAMILY MEDICINE

## 2017-10-06 PROCEDURE — 82728 ASSAY OF FERRITIN: CPT | Performed by: FAMILY MEDICINE

## 2017-10-06 PROCEDURE — 82306 VITAMIN D 25 HYDROXY: CPT | Performed by: FAMILY MEDICINE

## 2017-10-06 PROCEDURE — 80053 COMPREHEN METABOLIC PANEL: CPT | Performed by: FAMILY MEDICINE

## 2017-10-06 PROCEDURE — 84443 ASSAY THYROID STIM HORMONE: CPT | Performed by: FAMILY MEDICINE

## 2017-10-06 RX ORDER — TRAMADOL HYDROCHLORIDE 50 MG/1
50-100 TABLET ORAL EVERY 8 HOURS PRN
Qty: 30 TABLET | Refills: 0 | Status: SHIPPED | OUTPATIENT
Start: 2017-10-06 | End: 2017-12-19

## 2017-10-06 NOTE — MR AVS SNAPSHOT
After Visit Summary   10/6/2017    Dimple Oviedo    MRN: [id number]           Patient Information     Date Of Birth          6/23/1933        Visit Information        Provider Department      10/6/2017 3:20 PM Pop Zapien MD [facility]        Today's Diagnoses     Other fatigue    -  1    Nontoxic multinodular goiter        Polymyalgia rheumatica (H)        Impaired fasting blood sugar        Chronic bilateral low back pain without sciatica        Disorder of bone and cartilage           Follow-ups after your visit        Who to contact     If you have questions or need follow up information about today's clinic visit or your schedule please contact [facility] directly at [phone].  Normal or non-critical lab and imaging results will be communicated to you by MyChart, letter or phone within 4 business days after the clinic has received the results. If you do not hear from us within 7 days, please contact the clinic through [employer] or phone. If you have a critical or abnormal lab result, we will notify you by phone as soon as possible.  Submit refill requests through [employer] or call your pharmacy and they will forward the refill request to us. Please allow 3 business days for your refill to be completed.          Additional Information About Your Visit        MyChart Information     [employer] gives you secure access to your electronic health record. If you see a primary care provider, you can also send messages to your care team and make appointments. If you have questions, please call your primary care clinic.  If you do not have a primary care provider, please call [phone] and they will assist you.        Care EveryWhere ID     This is your Care EveryWhere ID. This could be used by other organizations to access your [facility] medical records  [id number]        Your Vitals Were     Pulse Temperature Height Pulse Oximetry BMI (Body Mass Index)        "70 98  F (36.7  C) (Oral) 4' 10\" (1.473 m) 98% 35.58 kg/m2        Blood Pressure from Last 3 Encounters:   10/06/17 138/68   08/14/17 124/60   07/31/17 142/80    Weight from Last 3 Encounters:   10/06/17 170 lb 4 oz (77.2 kg)   08/14/17 169 lb 8 oz (76.9 kg)   07/31/17 163 lb (73.9 kg)              We Performed the Following     CBC with platelets     Comprehensive metabolic panel     CRP, inflammation     ESR: Erythrocyte sedimentation rate     Ferritin     T4 free     TSH with free T4 reflex     Vitamin B12     Vitamin D deficiency screening          Today's Medication Changes          These changes are accurate as of: 10/6/17 11:59 PM.  If you have any questions, ask your nurse or doctor.               Start taking these medicines.        Dose/Directions    traMADol 50 MG tablet   Commonly known as:  ULTRAM   Used for:  Chronic bilateral low back pain without sciatica   Started by:  Pop Zapien MD        Dose:   mg   Take 1-2 tablets ( mg) by mouth every 8 hours as needed for pain maximum 2 tablet(s) per day   Quantity:  30 tablet   Refills:  0         These medicines have changed or have updated prescriptions.        Dose/Directions    aspirin 81 MG tablet   This may have changed:  See the new instructions.   Used for:  Routine medical exam        ONE DAILY   Quantity:  100   Refills:  3         Stop taking these medicines if you haven't already. Please contact your care team if you have questions.     sulfamethoxazole-trimethoprim 800-160 MG per tablet   Commonly known as:  BACTRIM DS/SEPTRA DS   Stopped by:  Pop Zapien MD                Where to get your medicines      Some of these will need a paper prescription and others can be bought over the counter.  Ask your nurse if you have questions.     Bring a paper prescription for each of these medications     traMADol 50 MG tablet                Primary Care Provider Office Phone # Fax #    Pop Zapien MD " "[phone]       [address] [place] [address]        Equal Access to Services     GUNNER RODRIGUEZ : Hadii shameka chacon bryce Pollack, waberylda luqadaha, qaybta kaalmada saira, maldonado mackabdias sotero. So [facility] [phone].    ATENCIÓN: Si habla español, tiene a sierra disposición servicios gratuitos de asistencia lingüística. Llame al [phone].    We comply with applicable federal civil rights laws and [place] laws. We do not discriminate on the basis of race, color, national origin, age, disability, sex, sexual orientation, or gender identity.            Thank you!     Thank you for choosing [facility]  for your care. Our goal is always to provide you with excellent care. Hearing back from our patients is one way we can continue to improve our services. Please take a few minutes to complete the written survey that you may receive in the mail after your visit with us. Thank you!             Your Updated Medication List - Protect others around you: Learn how to safely use, store and throw away your medicines at www.disposemymeds.org.          This list is accurate as of: 10/6/17 11:59 PM.  Always use your most recent med list.                   Brand Name Dispense Instructions for use Diagnosis    acetaminophen 650 MG 8 hour tablet     100 tablet    Take 650 mg by mouth every 8 hours as needed for mild pain or fever    Ascending cholangitis       * alendronate 70 MG tablet    FOSAMAX    12 tablet    TAKE 1 TABLET EVERY 7 DAYS AT LEAST 60 MIN BEFORE BREAKFAST AS DIRECTED \"SEE PACKAGE FOR ADDITIONAL INSTRUCTIONS\"    High risk medication use, Osteopenia       * alendronate 70 MG tablet    FOSAMAX    12 tablet    TAKE 1 TABLET EVERY 7 DAYS AT LEAST 60 MIN BEFORE BREAKFAST AS DIRECTED \"SEE PACKAGE FOR ADDITIONAL INSTRUCTIONS\"    High risk medication use, Osteopenia       aspirin 81 MG tablet     100    ONE DAILY    Routine medical exam       CALCIUM 500 + D 500-200 " MG-IU Tabs      1 tablets 3 times daily        Fish Oil 500 MG Caps      Take 1 capsule by mouth 3 times daily        irbesartan 300 MG tablet    AVAPRO    90 tablet    TAKE 1 TABLET EVERY DAY    Essential hypertension with goal blood pressure less than 140/90       lovastatin 40 MG tablet    MEVACOR    90 tablet    TAKE 1 TABLET AT BEDTIME (HYPERLIPIDEMIA LDL GOAL  BELOW 130)    Hyperlipidemia LDL goal <130       melatonin 3 MG tablet     90 tablet    Take 1 tablet (3 mg) by mouth nightly as needed for sleep    Insomnia       omeprazole 20 MG CR capsule    priLOSEC    180 capsule    Take 1 capsule (20 mg) by mouth daily Profile Rx: patient will contact pharmacy when needed    Gastroesophageal reflux disease without esophagitis       polyethylene glycol Packet    MIRALAX/GLYCOLAX    7 packet    Take 17 g by mouth 2 times daily as needed for constipation    Other constipation       prednisoLONE acetate 1 % ophthalmic susp    PRED FORTE     Place 1 drop into both eyes 2 times daily        RESTASIS 0.05 % ophthalmic emulsion   Generic drug:  cycloSPORINE      Place 1 drop into both eyes 2 times daily        traMADol 50 MG tablet    ULTRAM    30 tablet    Take 1-2 tablets ( mg) by mouth every 8 hours as needed for pain maximum 2 tablet(s) per day    Chronic bilateral low back pain without sciatica       * Notice:  This list has 2 medication(s) that are the same as other medications prescribed for you. Read the directions carefully, and ask your doctor or other care provider to review them with you.

## 2017-10-06 NOTE — NURSING NOTE
"Chief Complaint   Patient presents with     Hypertension     Lipids       Initial /68 (Cuff Size: Adult Large)  Pulse 70  Temp 98  F (36.7  C) (Oral)  Ht 4' 10\" (1.473 m)  Wt 170 lb 4 oz (77.2 kg)  SpO2 98%  BMI 35.58 kg/m2 Estimated body mass index is 35.58 kg/(m^2) as calculated from the following:    Height as of this encounter: 4' 10\" (1.473 m).    Weight as of this encounter: 170 lb 4 oz (77.2 kg).  Medication Reconciliation: complete     Amberly De León, VIRGEN      "

## 2017-10-06 NOTE — TELEPHONE ENCOUNTER
"Reason for Call:  Request for results:    Name of test or procedure: Lipid Panel     Date of test of procedure: 9/25/17    Location of the test or procedure: [facility]    OK to leave the result message on voice mail or with a family member? YES    Phone number Patient can be reached at:  Home number on file [phone] (home), Please call before 1300.    Additional comments: Patient is also requesting a glucose lab to \"make sure she is healthy\"    Call taken on 10/6/2017 at 9:00 AM by Mandi Spencer    "

## 2017-10-06 NOTE — TELEPHONE ENCOUNTER
"Writer called patient who stated:  1. Has not been feeling \"quite up to par\"  2. Thinks it is arthritis  3. Leaving on 10/14/17 for four weeks to take care of her daughter who is having hip surgery  4. At eye doctor recently and BP was elevated, believes BP readings were:  -174/60 and 167/\"something\"  5. Would like glucose checked-thinks it has been elevated previously    Writer recommended:  1. Schedule office visit for evaluation.  Appt scheduled today with Dr. Zapien.  Appt date, time and location confirmed with patient.    Patient verbalized understanding and in agreement with plan.  ITALO Figueroa, SHASHANKN, RN    "

## 2017-10-07 LAB
ALBUMIN SERPL-MCNC: 3.8 G/DL (ref 3.4–5)
ALP SERPL-CCNC: 74 U/L (ref 40–150)
ALT SERPL W P-5'-P-CCNC: 26 U/L (ref 0–50)
ANION GAP SERPL CALCULATED.3IONS-SCNC: 8 MMOL/L (ref 3–14)
AST SERPL W P-5'-P-CCNC: 17 U/L (ref 0–45)
BILIRUB SERPL-MCNC: 0.3 MG/DL (ref 0.2–1.3)
BUN SERPL-MCNC: 15 MG/DL (ref 7–30)
CALCIUM SERPL-MCNC: 9.1 MG/DL (ref 8.5–10.1)
CHLORIDE SERPL-SCNC: 106 MMOL/L (ref 94–109)
CO2 SERPL-SCNC: 27 MMOL/L (ref 20–32)
CREAT SERPL-MCNC: 0.65 MG/DL (ref 0.52–1.04)
DEPRECATED CALCIDIOL+CALCIFEROL SERPL-MC: 44 UG/L (ref 20–75)
FERRITIN SERPL-MCNC: 9 NG/ML (ref 8–252)
GFR SERPL CREATININE-BSD FRML MDRD: 86 ML/MIN/1.7M2
GLUCOSE SERPL-MCNC: 97 MG/DL (ref 70–99)
POTASSIUM SERPL-SCNC: 4.7 MMOL/L (ref 3.4–5.3)
PROT SERPL-MCNC: 7.4 G/DL (ref 6.8–8.8)
SODIUM SERPL-SCNC: 141 MMOL/L (ref 133–144)
T4 FREE SERPL-MCNC: 0.89 NG/DL (ref 0.76–1.46)
TSH SERPL DL<=0.005 MIU/L-ACNC: 0.12 MU/L (ref 0.4–4)

## 2017-10-09 ENCOUNTER — TELEPHONE (OUTPATIENT)
Dept: FAMILY MEDICINE | Facility: CLINIC | Age: 82
End: 2017-10-09

## 2017-10-09 NOTE — TELEPHONE ENCOUNTER
The patient has additional questions regarding taking an OTC iron supplement. See lab result note from Dr. Zapien for details.  Patient would like a call before 1330.

## 2017-10-27 DIAGNOSIS — K21.9 GASTROESOPHAGEAL REFLUX DISEASE WITHOUT ESOPHAGITIS: ICD-10-CM

## 2017-10-27 NOTE — TELEPHONE ENCOUNTER
Reason for Call:  Medication or medication refill:    Do you use a [facility]?  Name of the pharmacy and phone number for the current request:  [facility] - [place], [place] - [address]    Name of the medication requested: omeprazole (PRILOSEC) 20 MG capsule    Other request: Patient is requesting a refill on the medication above in advance so that it is ready through the mail when she is out. Thanks!    Can we leave a detailed message on this number? YES    Phone number patient can be reached at: [phone]    Best Time: Any    Call taken on 10/27/2017 at 12:13 PM by Belkis Antony

## 2017-11-06 DIAGNOSIS — E78.5 HYPERLIPIDEMIA LDL GOAL <130: ICD-10-CM

## 2017-11-07 RX ORDER — LOVASTATIN 40 MG
TABLET ORAL
Qty: 90 TABLET | Refills: 2 | Status: SHIPPED | OUTPATIENT
Start: 2017-11-07 | End: 2018-06-27

## 2017-11-07 NOTE — TELEPHONE ENCOUNTER
Lovastatin     Last Written Prescription Date: 9/5/17  Last Fill Quantity: 90, # refills: 0  [facility] [place] with [facility] or [facility] prescribing provider: 10/6/17  kenny srivastava         Lab Results   Component Value Date    CHOL 183 09/25/2017     Lab Results   Component Value Date    HDL 57 09/25/2017     Lab Results   Component Value Date    LDL 95 09/25/2017     Lab Results   Component Value Date    TRIG 155 09/25/2017     Lab Results   Component Value Date    CHOLHDLRATIO 2.5 04/28/2015

## 2017-11-30 ENCOUNTER — OFFICE VISIT (OUTPATIENT)
Dept: FAMILY MEDICINE | Facility: CLINIC | Age: 82
End: 2017-11-30
Payer: MEDICARE

## 2017-11-30 VITALS
WEIGHT: 167 LBS | SYSTOLIC BLOOD PRESSURE: 119 MMHG | HEART RATE: 87 BPM | OXYGEN SATURATION: 94 % | DIASTOLIC BLOOD PRESSURE: 41 MMHG | RESPIRATION RATE: 16 BRPM | BODY MASS INDEX: 34.9 KG/M2 | TEMPERATURE: 97.8 F

## 2017-11-30 DIAGNOSIS — R79.89 ABNORMAL TSH: ICD-10-CM

## 2017-11-30 DIAGNOSIS — E04.9 GOITER: ICD-10-CM

## 2017-11-30 DIAGNOSIS — G89.29 CHRONIC BILATERAL LOW BACK PAIN WITHOUT SCIATICA: ICD-10-CM

## 2017-11-30 DIAGNOSIS — Z01.818 PREOP GENERAL PHYSICAL EXAM: Primary | ICD-10-CM

## 2017-11-30 DIAGNOSIS — M89.9 DISORDER OF BONE AND CARTILAGE: ICD-10-CM

## 2017-11-30 DIAGNOSIS — I10 HYPERTENSION GOAL BP (BLOOD PRESSURE) < 140/90: ICD-10-CM

## 2017-11-30 DIAGNOSIS — M54.50 CHRONIC BILATERAL LOW BACK PAIN WITHOUT SCIATICA: ICD-10-CM

## 2017-11-30 DIAGNOSIS — M94.9 DISORDER OF BONE AND CARTILAGE: ICD-10-CM

## 2017-11-30 DIAGNOSIS — M35.3 POLYMYALGIA RHEUMATICA (H): ICD-10-CM

## 2017-11-30 DIAGNOSIS — D50.9 IRON DEFICIENCY ANEMIA, UNSPECIFIED IRON DEFICIENCY ANEMIA TYPE: ICD-10-CM

## 2017-11-30 DIAGNOSIS — K21.9 GASTROESOPHAGEAL REFLUX DISEASE WITHOUT ESOPHAGITIS: ICD-10-CM

## 2017-11-30 DIAGNOSIS — E78.5 HYPERLIPIDEMIA LDL GOAL <130: ICD-10-CM

## 2017-11-30 LAB — HGB BLD-MCNC: 12.4 G/DL (ref 11.7–15.7)

## 2017-11-30 PROCEDURE — 36415 COLL VENOUS BLD VENIPUNCTURE: CPT | Performed by: NURSE PRACTITIONER

## 2017-11-30 PROCEDURE — 84439 ASSAY OF FREE THYROXINE: CPT | Performed by: NURSE PRACTITIONER

## 2017-11-30 PROCEDURE — 85018 HEMOGLOBIN: CPT | Performed by: NURSE PRACTITIONER

## 2017-11-30 PROCEDURE — 82728 ASSAY OF FERRITIN: CPT | Performed by: NURSE PRACTITIONER

## 2017-11-30 PROCEDURE — 99214 OFFICE O/P EST MOD 30 MIN: CPT | Performed by: NURSE PRACTITIONER

## 2017-11-30 PROCEDURE — 84443 ASSAY THYROID STIM HORMONE: CPT | Performed by: NURSE PRACTITIONER

## 2017-11-30 NOTE — PROGRESS NOTES
[place] [id number] [place] [phone]  Dept: [phone]    PRE-OP EVALUATION:  Today's date: 2017    Dimple Oviedo (: 1933) presents for pre-operative evaluation assessment as requested by Dr. Brothers.  She requires evaluation and anesthesia risk assessment prior to undergoing surgery/procedure for treatment of Right eye .  Proposed procedure: Cataract     Date of Surgery/ Procedure: 17  Time of Surgery/ Procedure: 7:30  Hospital/Surgical Facility: [facility]  Fax number for surgical facility: [phone]  Primary Physician: Pop Zapien  Type of Anesthesia Anticipated: to be determined    Patient has a Health Care Directive or Living Will:  YES     1. NO - Do you have a history of heart attack, stroke, stent, bypass or surgery on an artery in the head, neck, heart or legs?  2. NO - Do you ever have any pain or discomfort in your chest?  3. NO - Do you have a history of  Heart Failure?  4. NO - Are you troubled by shortness of breath when: walking on the level, up a slight hill or at night?  5. NO - Do you currently have a cold, bronchitis or other respiratory infection?  6. NO - Do you have a cough, shortness of breath or wheezing?  7. NO - Do you sometimes get pains in the calves of your legs when you walk?  8. NO - Do you or anyone in your family have previous history of blood clots?  9. NO - Do you or does anyone in your family have a serious bleeding problem such as prolonged bleeding following surgeries or cuts?  10. YES - HAVE YOU EVER HAD PROBLEMS WITH ANEMIA OR BEEN TOLD TO TAKE IRON PILLS? See below  11. NO - Have you had any abnormal blood loss such as black, tarry or bloody stools, or abnormal vaginal bleeding?  12. NO - Have you ever had a blood transfusion?  13. NO - Have you or any of your relatives ever had problems with anesthesia?  14. NO - Do you have sleep apnea, excessive snoring or daytime  drowsiness?  15. NO - Do you have any prosthetic heart valves?  16. NO - Do you have prosthetic joints?  17. NO - Is there any chance that you may be pregnant?        HPI:                                                      Brief HPI related to upcoming procedure: cataracts    HTN - taking irbesartan 300mg daily.  denies chest pain, shortness of breath, or palpitations    Chronic back pain - uses tramadol as needed,, not on regular basis.  Has seen pain clinic in the past.    GERD- on PPI daily with resolution of symptoms.      PMR- 2015, treated with prednisone and eventually tapered off.    IFG- normal blood glucose 10/2017    Goiter- recent low TSH with normal T4 10/2017.    FRANK - hgb 11 with iron level 9 10/2017, started on iron supplement daily    Osteoporosis - on fosamax    Going to [place] in jan to visit daughter and her family.  Daughter is having hip surgery.          MEDICAL HISTORY:                                                      Patient Active Problem List    Diagnosis Date Noted     Obesity, unspecified obesity severity, unspecified obesity type 02/22/2017     Priority: Medium     Chronic bilateral low back pain without sciatica 12/05/2016     Priority: Medium     Impaired fasting blood sugar 11/24/2015     Priority: Medium     Sepsis (H) 10/30/2015     Priority: Medium     Shoulder pain 10/31/2014     Priority: Medium     High risk medication use 10/15/2014     Priority: Medium     Polymyalgia rheumatica (H) 07/10/2014     Priority: Medium     Hip arthritis 06/23/2014     Priority: Medium     Hypertension goal BP (blood pressure) < 140/90 11/30/2011     Priority: Medium     Urinary incontinence 11/30/2011     Priority: Medium     Osteoarthritis of left knee 11/30/2011     Priority: Medium     Hyperlipidemia LDL goal <130 05/09/2010     Priority: Medium     Nontoxic multinodular goiter      Priority: Medium     Malignant melanoma of skin of trunk, except scrotum (H)      Priority: Medium      Undiagnosed cardiac murmurs      Priority: Medium     perirectal cyst 12/16/2005     Priority: Medium     restless leg 10/12/2005     Priority: Medium     heat intolerance 10/12/2005     Priority: Medium     Goiter 10/12/2005     Priority: Medium     Problem list name updated by automated process. Provider to review       Disorder of bone and cartilage 10/12/2005     Priority: Medium     Problem list name updated by automated process. Provider to review       Other psoriasis 10/12/2005     Priority: Medium     Esophageal reflux 09/22/2004     Priority: Medium      Past Medical History:   Diagnosis Date     Calculus of kidney      Esophageal reflux      Hyperlipidemia LDL goal <130 5/9/2010     Malignant melanoma of skin of trunk, except scrotum (H)      Nonspecific abnormal finding     has living will 2004 -      Nontoxic multinodular goiter     no further eval /tx rec per pt     Osteopenia      Other psoriasis      Personal history of colonic polyps      PMR (polymyalgia rheumatica) (H)      Undiagnosed cardiac murmurs      Unspecified constipation      Unspecified essential hypertension      Past Surgical History:   Procedure Laterality Date     C NONSPECIFIC PROCEDURE  2005    colonoscopy polyp repeat 2010     ENDOSCOPIC ULTRASOUND LOWER GASTROINTESTIONAL TRACT (GI) N/A 10/30/2015    Procedure: ENDOSCOPIC ULTRASOUND LOWER GASTROINTESTIONAL TRACT (GI);  Surgeon: Daniel Jean-Baptiste MD;  Location: [facility] OR     LAPAROSCOPIC CHOLECYSTECTOMY WITH CHOLANGIOGRAMS N/A 11/1/2015    Procedure: LAPAROSCOPIC CHOLECYSTECTOMY WITH CHOLANGIOGRAMS;  Surgeon: Tonie Warren MD;  Location: [facility] OR     SURGICAL HISTORY OF -   1996    malignant melanoma     SURGICAL HISTORY OF -   1968    thyroid nodule     SURGICAL HISTORY OF -       D & C     Current Outpatient Prescriptions   Medication Sig Dispense Refill     lovastatin (MEVACOR) 40 MG tablet TAKE 1 TABLET AT BEDTIME (HYPERLIPIDEMIA LDL GOAL  BELOW 130) 90 tablet 2      "omeprazole (PRILOSEC) 20 MG CR capsule Take 1 capsule (20 mg) by mouth daily 180 capsule 3     traMADol (ULTRAM) 50 MG tablet Take 1-2 tablets ( mg) by mouth every 8 hours as needed for pain maximum 2 tablet(s) per day 30 tablet 0     alendronate (FOSAMAX) 70 MG tablet TAKE 1 TABLET EVERY 7 DAYS AT LEAST 60 MIN BEFORE BREAKFAST AS DIRECTED \"SEE PACKAGE FOR ADDITIONAL INSTRUCTIONS\" 12 tablet 2     alendronate (FOSAMAX) 70 MG tablet TAKE 1 TABLET EVERY 7 DAYS AT LEAST 60 MIN BEFORE BREAKFAST AS DIRECTED \"SEE PACKAGE FOR ADDITIONAL INSTRUCTIONS\" 12 tablet 0     irbesartan (AVAPRO) 300 MG tablet TAKE 1 TABLET EVERY DAY 90 tablet 0     acetaminophen 650 MG TABS Take 650 mg by mouth every 8 hours as needed for mild pain or fever 100 tablet 0     polyethylene glycol (MIRALAX/GLYCOLAX) packet Take 17 g by mouth 2 times daily as needed for constipation 7 packet 0     Omega-3 Fatty Acids (FISH OIL) 500 MG CAPS Take 1 capsule by mouth 3 times daily       prednisoLONE acetate (PRED FORTE) 1 % ophthalmic suspension Place 1 drop into both eyes 2 times daily       melatonin 3 MG tablet Take 1 tablet (3 mg) by mouth nightly as needed for sleep 90 tablet 0     cycloSPORINE (RESTASIS) 0.05 % ophthalmic emulsion Place 1 drop into both eyes 2 times daily       ASPIRIN 81 MG OR TABS ONE DAILY (Patient taking differently: ONE DAILY at bedtime) 100 3     CALCIUM 500 + D 500-200 MG-IU OR TABS 1 tablets 3 times daily  0     OTC products: Aspirin    Allergies   Allergen Reactions     No Known Drug Allergies       Latex Allergy: NO    Social History   Substance Use Topics     Smoking status: Never Smoker     Smokeless tobacco: Never Used     Alcohol use No      Comment: 6 times per year-one drink     History   Drug Use No       REVIEW OF SYSTEMS:                                                    C: NEGATIVE for fever, chills, change in weight  E/M: NEGATIVE for ear, mouth and throat problems  R: NEGATIVE for significant cough or " SOB  CV: NEGATIVE for chest pain, palpitations or peripheral edema    EXAM:                                                    There were no vitals taken for this visit.  GENERAL APPEARANCE: healthy, alert and no distress  HENT: ear canals and TM's normal and nose and mouth without ulcers or lesions  RESP: lungs clear to auscultation - no rales, rhonchi or wheezes  CV: regular rate and rhythm, normal S1 S2, no S3 or S4 and no murmur, click or rub   ABDOMEN: soft, nontender, no HSM or masses and bowel sounds normal  NEURO: Normal strength and tone, sensory exam grossly normal, mentation intact and speech normal    DIAGNOSTICS:                                                    No labs or EKG required for low risk surgery (cataract, skin procedure, breast biopsy, etc)    Recent Labs   Lab Test  10/06/17   1544 01/24/17 11/09/16   1439  11/02/16   1351   02/24/16   0928  11/18/15   1134   11/01/15   0727   10/30/15   0751   HGB  11.0*   --    --   12.4   < >   --    --    < >  11.6*   < >  12.1   PLT  354   --    --   395   < >   --    --    < >  204   < >  224   INR   --    --    --    --    --    --    --    --   0.95   --   1.00   NA  141   --   142   --    < >   --    --    < >  144   < >  139   POTASSIUM  4.7   --   4.5   --    < >   --    --    < >  4.4   < >  3.8   CR  0.65  0.920  0.76   --    < >   --    --    < >  0.90   < >  0.73   A1C   --    --    --    --    --   5.8  6.4*   --    --    --    --     < > = values in this interval not displayed.        IMPRESSION:                                                    Reason for surgery/procedure: cataracts    The proposed surgical procedure is considered LOW risk.    REVISED CARDIAC RISK INDEX  The patient has the following serious cardiovascular risks for perioperative complications such as (MI, PE, VFib and 3  AV Block):  No serious cardiac risks  INTERPRETATION: 0 risks: Class I (very low risk - 0.4% complication rate)    The patient has the following  additional risks for perioperative complications:  No identified additional risks    No diagnosis found.    RECOMMENDATIONS:                                                        --Patient is to take all scheduled medications on the day of surgery EXCEPT for modifications listed below.    Anticoagulant or Antiplatelet Medication Use  ASPIRIN: Bleeding risk is low for this procedure and aspirin 81 mg daily should be continued in the perioperative period        ACE Inhibitor or Angiotensin Receptor Blocker (ARB) Use  Ace inhibitor or Angiotensin Receptor Blocker (ARB) and should HOLD this medication for the 24 hours prior to surgery.      Will recheck TSH and hgb/iron today but not neccessary for surgical clearance.      APPROVAL GIVEN to proceed with proposed procedure, without further diagnostic evaluation       Signed Electronically by: NELSON Matos CNP    Copy of this evaluation report is provided to requesting physician.    [place] Preop Guidelines

## 2017-11-30 NOTE — MR AVS SNAPSHOT
After Visit Summary   11/30/2017    Dimple Oviedo    MRN: [id number]           Patient Information     Date Of Birth          6/23/1933        Visit Information        Provider Department      11/30/2017 6:20 PM Evon Arroyo APRN CNP [facility]        Today's Diagnoses     Preop general physical exam    -  1    Hypertension goal BP (blood pressure) < 140/90        Iron deficiency anemia, unspecified iron deficiency anemia type        Abnormal TSH        Goiter        Gastroesophageal reflux disease without esophagitis        Disorder of bone and cartilage        Hyperlipidemia LDL goal <130        Polymyalgia rheumatica (H)        Chronic bilateral low back pain without sciatica          Care Instructions    1.  Do not take irbesartan the morning of surgery     Before Your Surgery      Call your surgeon if there is any change in your health. This includes signs of a cold or flu (such as a sore throat, runny nose, cough, rash or fever).    Do not smoke, drink alcohol or take over the counter medicine (unless your surgeon or primary care doctor tells you to) for the 24 hours before and after surgery.    If you take prescribed drugs: Follow your doctor s orders about which medicines to take and which to stop until after surgery.    Eating and drinking prior to surgery: follow the instructions from your surgeon    Take a shower or bath the night before surgery. Use the soap your surgeon gave you to gently clean your skin. If you do not have soap from your surgeon, use your regular soap. Do not shave or scrub the surgery site.  Wear clean pajamas and have clean sheets on your bed.           Follow-ups after your visit        Who to contact     If you have questions or need follow up information about today's clinic visit or your schedule please contact [facility] directly at [phone].  Normal or non-critical lab and imaging results will be communicated to you  by [employer], letter or phone within 4 business days after the clinic has received the results. If you do not hear from us within 7 days, please contact the clinic through [employer] or phone. If you have a critical or abnormal lab result, we will notify you by phone as soon as possible.  Submit refill requests through [employer] or call your pharmacy and they will forward the refill request to us. Please allow 3 business days for your refill to be completed.          Additional Information About Your Visit        [employer] Information     [employer] gives you secure access to your electronic health record. If you see a primary care provider, you can also send messages to your care team and make appointments. If you have questions, please call your primary care clinic.  If you do not have a primary care provider, please call [phone] and they will assist you.        Care EveryWhere ID     This is your Care EveryWhere ID. This could be used by other organizations to access your [facility] medical records  [id number]        Your Vitals Were     Pulse Temperature Respirations Pulse Oximetry BMI (Body Mass Index)       87 97.8  F (36.6  C) (Tympanic) 16 94% 34.9 kg/m2        Blood Pressure from Last 3 Encounters:   11/30/17 119/41   10/06/17 138/68   08/14/17 124/60    Weight from Last 3 Encounters:   11/30/17 167 lb (75.8 kg)   10/06/17 170 lb 4 oz (77.2 kg)   08/14/17 169 lb 8 oz (76.9 kg)              Today, you had the following     No orders found for display         Today's Medication Changes          These changes are accurate as of: 11/30/17  6:26 PM.  If you have any questions, ask your nurse or doctor.               These medicines have changed or have updated prescriptions.        Dose/Directions    aspirin 81 MG tablet   This may have changed:  See the new instructions.   Used for:  Routine medical exam        ONE DAILY   Quantity:  100   Refills:  3                Primary Care Provider Office Phone # Fax #     "Pop Zapien [phone]       [facility] [place] [id number]        Equal Access to Services     GUNNER RODRIGUEZ : Hadii shameka chacon bryce Pollack, waberylda lubabatunde, qadevta kaazeemda saira, maldonado makcabdias sotero. So [place] [phone].    ATENCIÓN: Si habla español, tiene a sierra disposición servicios gratuitos de asistencia lingüística. Llame al [phone].    We comply with applicable federal civil rights laws and [place] laws. We do not discriminate on the basis of race, color, national origin, age, disability, sex, sexual orientation, or gender identity.            Thank you!     Thank you for choosing [facility]  for your care. Our goal is always to provide you with excellent care. Hearing back from our patients is one way we can continue to improve our services. Please take a few minutes to complete the written survey that you may receive in the mail after your visit with us. Thank you!             Your Updated Medication List - Protect others around you: Learn how to safely use, store and throw away your medicines at www.disposemymeds.org.          This list is accurate as of: 11/30/17  6:26 PM.  Always use your most recent med list.                   Brand Name Dispense Instructions for use Diagnosis    acetaminophen 650 MG 8 hour tablet     100 tablet    Take 650 mg by mouth every 8 hours as needed for mild pain or fever    Ascending cholangitis       * alendronate 70 MG tablet    FOSAMAX    12 tablet    TAKE 1 TABLET EVERY 7 DAYS AT LEAST 60 MIN BEFORE BREAKFAST AS DIRECTED \"SEE PACKAGE FOR ADDITIONAL INSTRUCTIONS\"    High risk medication use, Osteopenia       * alendronate 70 MG tablet    FOSAMAX    12 tablet    TAKE 1 TABLET EVERY 7 DAYS AT LEAST 60 MIN BEFORE BREAKFAST AS DIRECTED \"SEE PACKAGE FOR ADDITIONAL INSTRUCTIONS\"    High risk medication use, Osteopenia       aspirin 81 MG tablet     100    ONE DAILY    Routine medical exam    "    CALCIUM 500 + D 500-200 MG-IU Tabs      1 tablets 3 times daily        Fish Oil 500 MG Caps      Take 1 capsule by mouth 3 times daily        irbesartan 300 MG tablet    AVAPRO    90 tablet    TAKE 1 TABLET EVERY DAY    Essential hypertension with goal blood pressure less than 140/90       lovastatin 40 MG tablet    MEVACOR    90 tablet    TAKE 1 TABLET AT BEDTIME (HYPERLIPIDEMIA LDL GOAL  BELOW 130)    Hyperlipidemia LDL goal <130       melatonin 3 MG tablet     90 tablet    Take 1 tablet (3 mg) by mouth nightly as needed for sleep    Insomnia       omeprazole 20 MG CR capsule    priLOSEC    180 capsule    Take 1 capsule (20 mg) by mouth daily    Gastroesophageal reflux disease without esophagitis       polyethylene glycol Packet    MIRALAX/GLYCOLAX    7 packet    Take 17 g by mouth 2 times daily as needed for constipation    Other constipation       prednisoLONE acetate 1 % ophthalmic susp    PRED FORTE     Place 1 drop into both eyes 2 times daily        RESTASIS 0.05 % ophthalmic emulsion   Generic drug:  cycloSPORINE      Place 1 drop into both eyes 2 times daily        traMADol 50 MG tablet    ULTRAM    30 tablet    Take 1-2 tablets ( mg) by mouth every 8 hours as needed for pain maximum 2 tablet(s) per day    Chronic bilateral low back pain without sciatica       * Notice:  This list has 2 medication(s) that are the same as other medications prescribed for you. Read the directions carefully, and ask your doctor or other care provider to review them with you.

## 2017-11-30 NOTE — LETTER
December 4, 2017      Dimple Oviedo  [address]   [facility] [address]        Dear ,    We are writing to inform you of your test results.    Normal hemoglobin and iron levels, I recommend you continue your current dose of iron.    Thyroid levels are off, they are higher than normal which can be concerning for hyperthyroidism.  I recommend you follow up with a specialist from endocrinology.      I will have a nurse call you to discuss this.    Resulted Orders   Hemoglobin   Result Value Ref Range    Hemoglobin 12.4 11.7 - 15.7 g/dL   Ferritin   Result Value Ref Range    Ferritin 40 8 - 252 ng/mL   TSH with free T4 reflex   Result Value Ref Range    TSH <0.01 (L) 0.40 - 4.00 mU/L   T4 free   Result Value Ref Range    T4 Free 2.11 (H) 0.76 - 1.46 ng/dL     If you have any questions or concerns, please call the clinic at the number listed above.     Sincerely,    Evon Arroyo, NELSON CNP/nr

## 2017-11-30 NOTE — PATIENT INSTRUCTIONS
1.  Do not take irbesartan the morning of surgery     Before Your Surgery      Call your surgeon if there is any change in your health. This includes signs of a cold or flu (such as a sore throat, runny nose, cough, rash or fever).    Do not smoke, drink alcohol or take over the counter medicine (unless your surgeon or primary care doctor tells you to) for the 24 hours before and after surgery.    If you take prescribed drugs: Follow your doctor s orders about which medicines to take and which to stop until after surgery.    Eating and drinking prior to surgery: follow the instructions from your surgeon    Take a shower or bath the night before surgery. Use the soap your surgeon gave you to gently clean your skin. If you do not have soap from your surgeon, use your regular soap. Do not shave or scrub the surgery site.  Wear clean pajamas and have clean sheets on your bed.

## 2017-12-01 LAB
FERRITIN SERPL-MCNC: 40 NG/ML (ref 8–252)
T4 FREE SERPL-MCNC: 2.11 NG/DL (ref 0.76–1.46)
TSH SERPL DL<=0.005 MIU/L-ACNC: <0.01 MU/L (ref 0.4–4)

## 2017-12-04 ENCOUNTER — TELEPHONE (OUTPATIENT)
Dept: FAMILY MEDICINE | Facility: CLINIC | Age: 82
End: 2017-12-04

## 2017-12-04 DIAGNOSIS — E05.90 HYPERTHYROIDISM: Primary | ICD-10-CM

## 2017-12-04 NOTE — TELEPHONE ENCOUNTER
LM for pt.  Please call and speak with an available triage nurse. We have a result message to get to you.  ATUL Lee

## 2017-12-04 NOTE — TELEPHONE ENCOUNTER
Please call pt to inform her thyroid levels are more off.  Her T4 (circulating level of thyroid hormone) is elevated and TSH is low, this can indicate hyperthyroidism.  Symptoms can include weight loss, feeling hot all the time, palpitations, hair/skin changes, and diarrhea.  I recommend follow up with endocrine, referral made.  [facility] will take a while to get into. Might consider [facility] or endocrine clinic of [facility].  Referral made.    Normal hgb and giovani, cont current dose iron supplement.    Evon Arroyo, CNP

## 2017-12-04 NOTE — PROGRESS NOTES
Dimple,    Normal hemoglobin and iron levels, I recommend you continue your current dose of iron.    Thyroid levels are off, they are higher than normal which can be concerning for hyperthyroidism.  I recommend you follow up with a specialist from endocrinology.      I will have a nurse call you to discuss this.    -Evon Arroyo, CNP

## 2017-12-11 ENCOUNTER — TRANSFERRED RECORDS (OUTPATIENT)
Dept: HEALTH INFORMATION MANAGEMENT | Facility: CLINIC | Age: 82
End: 2017-12-11

## 2017-12-13 ENCOUNTER — APPOINTMENT (OUTPATIENT)
Dept: CT IMAGING | Facility: CLINIC | Age: 82
DRG: 286 | End: 2017-12-13
Attending: EMERGENCY MEDICINE
Payer: MEDICARE

## 2017-12-13 ENCOUNTER — APPOINTMENT (OUTPATIENT)
Dept: CARDIOLOGY | Facility: CLINIC | Age: 82
DRG: 286 | End: 2017-12-13
Attending: EMERGENCY MEDICINE
Payer: MEDICARE

## 2017-12-13 ENCOUNTER — HOSPITAL ENCOUNTER (INPATIENT)
Facility: CLINIC | Age: 82
LOS: 1 days | Discharge: HOME | DRG: 286 | End: 2017-12-14
Attending: EMERGENCY MEDICINE | Admitting: INTERNAL MEDICINE
Payer: MEDICARE

## 2017-12-13 ENCOUNTER — APPOINTMENT (OUTPATIENT)
Dept: CARDIOLOGY | Facility: CLINIC | Age: 82
DRG: 286 | End: 2017-12-13
Attending: NURSE PRACTITIONER
Payer: MEDICARE

## 2017-12-13 ENCOUNTER — APPOINTMENT (OUTPATIENT)
Dept: GENERAL RADIOLOGY | Facility: CLINIC | Age: 82
DRG: 286 | End: 2017-12-13
Attending: EMERGENCY MEDICINE
Payer: MEDICARE

## 2017-12-13 DIAGNOSIS — I24.9 ACS (ACUTE CORONARY SYNDROME) (H): ICD-10-CM

## 2017-12-13 DIAGNOSIS — R79.89 ELEVATED TROPONIN: ICD-10-CM

## 2017-12-13 DIAGNOSIS — I10 ESSENTIAL HYPERTENSION, MALIGNANT: ICD-10-CM

## 2017-12-13 DIAGNOSIS — I21.4 ACUTE MYOCARDIAL INFARCTION, SUBENDOCARDIAL INFARCTION, INITIAL EPISODE OF CARE (H): ICD-10-CM

## 2017-12-13 DIAGNOSIS — I21.4: ICD-10-CM

## 2017-12-13 LAB
ALBUMIN SERPL-MCNC: 3 G/DL (ref 3.4–5)
ALP SERPL-CCNC: 74 U/L (ref 40–150)
ALT SERPL W P-5'-P-CCNC: 85 U/L (ref 0–50)
ANION GAP SERPL CALCULATED.3IONS-SCNC: 7 MMOL/L (ref 3–14)
ANION GAP SERPL CALCULATED.3IONS-SCNC: 9 MMOL/L (ref 3–14)
AST SERPL W P-5'-P-CCNC: 76 U/L (ref 0–45)
BASOPHILS # BLD AUTO: 0 10E9/L (ref 0–0.2)
BASOPHILS NFR BLD AUTO: 0.5 %
BILIRUB SERPL-MCNC: 0.5 MG/DL (ref 0.2–1.3)
BUN SERPL-MCNC: 23 MG/DL (ref 7–30)
BUN SERPL-MCNC: 24 MG/DL (ref 7–30)
CALCIUM SERPL-MCNC: 8.3 MG/DL (ref 8.5–10.1)
CALCIUM SERPL-MCNC: 8.7 MG/DL (ref 8.5–10.1)
CHLORIDE SERPL-SCNC: 103 MMOL/L (ref 94–109)
CHLORIDE SERPL-SCNC: 110 MMOL/L (ref 94–109)
CO2 BLDCOV-SCNC: 26 MMOL/L (ref 21–28)
CO2 SERPL-SCNC: 24 MMOL/L (ref 20–32)
CO2 SERPL-SCNC: 27 MMOL/L (ref 20–32)
CREAT SERPL-MCNC: 0.65 MG/DL (ref 0.52–1.04)
CREAT SERPL-MCNC: 0.77 MG/DL (ref 0.52–1.04)
D DIMER PPP FEU-MCNC: 1.3 UG/ML FEU (ref 0–0.5)
DIFFERENTIAL METHOD BLD: ABNORMAL
EOSINOPHIL # BLD AUTO: 0.1 10E9/L (ref 0–0.7)
EOSINOPHIL NFR BLD AUTO: 1.2 %
ERYTHROCYTE [DISTWIDTH] IN BLOOD BY AUTOMATED COUNT: 15.5 % (ref 10–15)
ERYTHROCYTE [DISTWIDTH] IN BLOOD BY AUTOMATED COUNT: 15.5 % (ref 10–15)
GFR SERPL CREATININE-BSD FRML MDRD: 72 ML/MIN/1.7M2
GFR SERPL CREATININE-BSD FRML MDRD: 87 ML/MIN/1.7M2
GLUCOSE SERPL-MCNC: 119 MG/DL (ref 70–99)
GLUCOSE SERPL-MCNC: 226 MG/DL (ref 70–99)
HCG SERPL QL: NEGATIVE
HCT VFR BLD AUTO: 37.2 % (ref 35–47)
HCT VFR BLD AUTO: 38 % (ref 35–47)
HGB BLD-MCNC: 11.6 G/DL (ref 11.7–15.7)
HGB BLD-MCNC: 11.9 G/DL (ref 11.7–15.7)
IMM GRANULOCYTES # BLD: 0 10E9/L (ref 0–0.4)
IMM GRANULOCYTES NFR BLD: 0.1 %
INR PPP: 1.02 (ref 0.86–1.14)
INTERPRETATION ECG - MUSE: NORMAL
INTERPRETATION ECG - MUSE: NORMAL
LACTATE BLD-SCNC: 1 MMOL/L (ref 0.7–2.1)
LMWH PPP CHRO-ACNC: 0.36 IU/ML
LYMPHOCYTES # BLD AUTO: 2.4 10E9/L (ref 0.8–5.3)
LYMPHOCYTES NFR BLD AUTO: 33.2 %
MAGNESIUM SERPL-MCNC: 2 MG/DL (ref 1.6–2.3)
MCH RBC QN AUTO: 29 PG (ref 26.5–33)
MCH RBC QN AUTO: 29.1 PG (ref 26.5–33)
MCHC RBC AUTO-ENTMCNC: 31.2 G/DL (ref 31.5–36.5)
MCHC RBC AUTO-ENTMCNC: 31.3 G/DL (ref 31.5–36.5)
MCV RBC AUTO: 93 FL (ref 78–100)
MCV RBC AUTO: 93 FL (ref 78–100)
MONOCYTES # BLD AUTO: 0.9 10E9/L (ref 0–1.3)
MONOCYTES NFR BLD AUTO: 12.4 %
NEUTROPHILS # BLD AUTO: 3.9 10E9/L (ref 1.6–8.3)
NEUTROPHILS NFR BLD AUTO: 52.6 %
NRBC # BLD AUTO: 0 10*3/UL
NRBC BLD AUTO-RTO: 0 /100
NT-PROBNP SERPL-MCNC: 1199 PG/ML (ref 0–1800)
PCO2 BLDV: 43 MM HG (ref 40–50)
PH BLDV: 7.38 PH (ref 7.32–7.43)
PHOSPHATE SERPL-MCNC: 2.4 MG/DL (ref 2.5–4.5)
PLATELET # BLD AUTO: 277 10E9/L (ref 150–450)
PLATELET # BLD AUTO: 295 10E9/L (ref 150–450)
PO2 BLDV: 40 MM HG (ref 25–47)
POTASSIUM SERPL-SCNC: 3.8 MMOL/L (ref 3.4–5.3)
POTASSIUM SERPL-SCNC: 3.8 MMOL/L (ref 3.4–5.3)
PROT SERPL-MCNC: 6.8 G/DL (ref 6.8–8.8)
RBC # BLD AUTO: 4 10E12/L (ref 3.8–5.2)
RBC # BLD AUTO: 4.09 10E12/L (ref 3.8–5.2)
SAO2 % BLDV FROM PO2: 74 %
SODIUM SERPL-SCNC: 139 MMOL/L (ref 133–144)
SODIUM SERPL-SCNC: 142 MMOL/L (ref 133–144)
TROPONIN I BLD-MCNC: 0.06 UG/L (ref 0–0.1)
TROPONIN I SERPL-MCNC: 0.11 UG/L (ref 0–0.04)
TROPONIN I SERPL-MCNC: 1.33 UG/L (ref 0–0.04)
WBC # BLD AUTO: 7.4 10E9/L (ref 4–11)
WBC # BLD AUTO: 8.3 10E9/L (ref 4–11)

## 2017-12-13 PROCEDURE — C1894 INTRO/SHEATH, NON-LASER: HCPCS

## 2017-12-13 PROCEDURE — 96366 THER/PROPH/DIAG IV INF ADDON: CPT | Performed by: EMERGENCY MEDICINE

## 2017-12-13 PROCEDURE — 25000128 H RX IP 250 OP 636: Performed by: NURSE PRACTITIONER

## 2017-12-13 PROCEDURE — 85610 PROTHROMBIN TIME: CPT | Performed by: NURSE PRACTITIONER

## 2017-12-13 PROCEDURE — 93005 ELECTROCARDIOGRAM TRACING: CPT | Performed by: EMERGENCY MEDICINE

## 2017-12-13 PROCEDURE — 25000132 ZZH RX MED GY IP 250 OP 250 PS 637: Mod: GY | Performed by: EMERGENCY MEDICINE

## 2017-12-13 PROCEDURE — 4A023N7 MEASUREMENT OF CARDIAC SAMPLING AND PRESSURE, LEFT HEART, PERCUTANEOUS APPROACH: ICD-10-PCS | Performed by: INTERNAL MEDICINE

## 2017-12-13 PROCEDURE — 84484 ASSAY OF TROPONIN QUANT: CPT

## 2017-12-13 PROCEDURE — 85027 COMPLETE CBC AUTOMATED: CPT | Performed by: EMERGENCY MEDICINE

## 2017-12-13 PROCEDURE — 84703 CHORIONIC GONADOTROPIN ASSAY: CPT | Performed by: NURSE PRACTITIONER

## 2017-12-13 PROCEDURE — C1769 GUIDE WIRE: HCPCS

## 2017-12-13 PROCEDURE — 71260 CT THORAX DX C+: CPT

## 2017-12-13 PROCEDURE — 93306 TTE W/DOPPLER COMPLETE: CPT | Mod: 26 | Performed by: INTERNAL MEDICINE

## 2017-12-13 PROCEDURE — 27210946 ZZH KIT HC TOTES DISP CR8

## 2017-12-13 PROCEDURE — 36415 COLL VENOUS BLD VENIPUNCTURE: CPT | Performed by: NURSE PRACTITIONER

## 2017-12-13 PROCEDURE — 96376 TX/PRO/DX INJ SAME DRUG ADON: CPT | Performed by: EMERGENCY MEDICINE

## 2017-12-13 PROCEDURE — 96365 THER/PROPH/DIAG IV INF INIT: CPT | Performed by: EMERGENCY MEDICINE

## 2017-12-13 PROCEDURE — 93010 ELECTROCARDIOGRAM REPORT: CPT | Mod: Z6 | Performed by: EMERGENCY MEDICINE

## 2017-12-13 PROCEDURE — 21400006 ZZH R&B CCU INTERMEDIATE UMMC

## 2017-12-13 PROCEDURE — 99152 MOD SED SAME PHYS/QHP 5/>YRS: CPT | Performed by: INTERNAL MEDICINE

## 2017-12-13 PROCEDURE — 25500064 ZZH RX 255 OP 636: Performed by: EMERGENCY MEDICINE

## 2017-12-13 PROCEDURE — 80053 COMPREHEN METABOLIC PANEL: CPT | Performed by: EMERGENCY MEDICINE

## 2017-12-13 PROCEDURE — 85520 HEPARIN ASSAY: CPT | Performed by: NURSE PRACTITIONER

## 2017-12-13 PROCEDURE — 27211089 ZZH KIT ACIST INJECTOR CR3

## 2017-12-13 PROCEDURE — 25000125 ZZHC RX 250: Performed by: INTERNAL MEDICINE

## 2017-12-13 PROCEDURE — 25000128 H RX IP 250 OP 636: Performed by: EMERGENCY MEDICINE

## 2017-12-13 PROCEDURE — 36415 COLL VENOUS BLD VENIPUNCTURE: CPT | Performed by: INTERNAL MEDICINE

## 2017-12-13 PROCEDURE — 84484 ASSAY OF TROPONIN QUANT: CPT | Performed by: EMERGENCY MEDICINE

## 2017-12-13 PROCEDURE — 71010 XR CHEST PORT 1 VW: CPT

## 2017-12-13 PROCEDURE — 99292 CRITICAL CARE ADDL 30 MIN: CPT | Mod: 25 | Performed by: EMERGENCY MEDICINE

## 2017-12-13 PROCEDURE — 93458 L HRT ARTERY/VENTRICLE ANGIO: CPT | Mod: 26 | Performed by: INTERNAL MEDICINE

## 2017-12-13 PROCEDURE — 83880 ASSAY OF NATRIURETIC PEPTIDE: CPT | Performed by: EMERGENCY MEDICINE

## 2017-12-13 PROCEDURE — 82803 BLOOD GASES ANY COMBINATION: CPT

## 2017-12-13 PROCEDURE — 40000264 ECHO COMPLETE WITH OPTISON

## 2017-12-13 PROCEDURE — 25000128 H RX IP 250 OP 636: Performed by: INTERNAL MEDICINE

## 2017-12-13 PROCEDURE — B2111ZZ FLUOROSCOPY OF MULTIPLE CORONARY ARTERIES USING LOW OSMOLAR CONTRAST: ICD-10-PCS | Performed by: INTERNAL MEDICINE

## 2017-12-13 PROCEDURE — 27210742 ZZH CATH CR1

## 2017-12-13 PROCEDURE — 96375 TX/PRO/DX INJ NEW DRUG ADDON: CPT | Performed by: EMERGENCY MEDICINE

## 2017-12-13 PROCEDURE — 27210787 ZZH MANIFOLD CR2

## 2017-12-13 PROCEDURE — 83605 ASSAY OF LACTIC ACID: CPT

## 2017-12-13 PROCEDURE — 85379 FIBRIN DEGRADATION QUANT: CPT | Performed by: EMERGENCY MEDICINE

## 2017-12-13 PROCEDURE — 99152 MOD SED SAME PHYS/QHP 5/>YRS: CPT

## 2017-12-13 PROCEDURE — 93458 L HRT ARTERY/VENTRICLE ANGIO: CPT

## 2017-12-13 PROCEDURE — 25000128 H RX IP 250 OP 636: Performed by: RADIOLOGY

## 2017-12-13 PROCEDURE — 25000125 ZZHC RX 250: Performed by: RADIOLOGY

## 2017-12-13 PROCEDURE — 99291 CRITICAL CARE FIRST HOUR: CPT | Mod: 25 | Performed by: EMERGENCY MEDICINE

## 2017-12-13 PROCEDURE — A9270 NON-COVERED ITEM OR SERVICE: HCPCS | Mod: GY | Performed by: EMERGENCY MEDICINE

## 2017-12-13 PROCEDURE — 83735 ASSAY OF MAGNESIUM: CPT | Performed by: INTERNAL MEDICINE

## 2017-12-13 PROCEDURE — 84100 ASSAY OF PHOSPHORUS: CPT | Performed by: INTERNAL MEDICINE

## 2017-12-13 PROCEDURE — 25000125 ZZHC RX 250: Performed by: NURSE PRACTITIONER

## 2017-12-13 PROCEDURE — 99285 EMERGENCY DEPT VISIT HI MDM: CPT | Mod: 25 | Performed by: EMERGENCY MEDICINE

## 2017-12-13 PROCEDURE — 80048 BASIC METABOLIC PNL TOTAL CA: CPT | Performed by: INTERNAL MEDICINE

## 2017-12-13 PROCEDURE — 85025 COMPLETE CBC W/AUTO DIFF WBC: CPT | Performed by: EMERGENCY MEDICINE

## 2017-12-13 RX ORDER — SODIUM NITROPRUSSIDE 25 MG/ML
100-200 INJECTION INTRAVENOUS
Status: DISCONTINUED | OUTPATIENT
Start: 2017-12-13 | End: 2017-12-13 | Stop reason: HOSPADM

## 2017-12-13 RX ORDER — NITROGLYCERIN 20 MG/100ML
.07-2 INJECTION INTRAVENOUS CONTINUOUS
Status: DISCONTINUED | OUTPATIENT
Start: 2017-12-13 | End: 2017-12-13

## 2017-12-13 RX ORDER — PROTAMINE SULFATE 10 MG/ML
1-5 INJECTION, SOLUTION INTRAVENOUS
Status: DISCONTINUED | OUTPATIENT
Start: 2017-12-13 | End: 2017-12-13 | Stop reason: HOSPADM

## 2017-12-13 RX ORDER — METHYLPREDNISOLONE SODIUM SUCCINATE 125 MG/2ML
125 INJECTION, POWDER, LYOPHILIZED, FOR SOLUTION INTRAMUSCULAR; INTRAVENOUS
Status: DISCONTINUED | OUTPATIENT
Start: 2017-12-13 | End: 2017-12-13 | Stop reason: HOSPADM

## 2017-12-13 RX ORDER — NITROGLYCERIN 5 MG/ML
100-500 VIAL (ML) INTRAVENOUS
Status: DISCONTINUED | OUTPATIENT
Start: 2017-12-13 | End: 2017-12-13 | Stop reason: HOSPADM

## 2017-12-13 RX ORDER — ASPIRIN 81 MG/1
81-324 TABLET, CHEWABLE ORAL
Status: DISCONTINUED | OUTPATIENT
Start: 2017-12-13 | End: 2017-12-13 | Stop reason: HOSPADM

## 2017-12-13 RX ORDER — NIFEDIPINE 10 MG/1
10 CAPSULE ORAL
Status: DISCONTINUED | OUTPATIENT
Start: 2017-12-13 | End: 2017-12-13 | Stop reason: HOSPADM

## 2017-12-13 RX ORDER — OFLOXACIN 3 MG/ML
1 SOLUTION/ DROPS OPHTHALMIC 4 TIMES DAILY
Status: DISCONTINUED | OUTPATIENT
Start: 2017-12-13 | End: 2017-12-14 | Stop reason: HOSPADM

## 2017-12-13 RX ORDER — ATROPINE SULFATE 0.1 MG/ML
.5-1 INJECTION INTRAVENOUS
Status: DISCONTINUED | OUTPATIENT
Start: 2017-12-13 | End: 2017-12-13 | Stop reason: HOSPADM

## 2017-12-13 RX ORDER — ACETAMINOPHEN 325 MG/1
650 TABLET ORAL EVERY 4 HOURS PRN
Status: DISCONTINUED | OUTPATIENT
Start: 2017-12-13 | End: 2017-12-14 | Stop reason: HOSPADM

## 2017-12-13 RX ORDER — PROPRANOLOL HCL 60 MG
60 CAPSULE, EXTENDED RELEASE 24HR ORAL DAILY
COMMUNITY
End: 2018-01-24

## 2017-12-13 RX ORDER — DOBUTAMINE HYDROCHLORIDE 200 MG/100ML
2-20 INJECTION INTRAVENOUS CONTINUOUS PRN
Status: DISCONTINUED | OUTPATIENT
Start: 2017-12-13 | End: 2017-12-13 | Stop reason: HOSPADM

## 2017-12-13 RX ORDER — FUROSEMIDE 10 MG/ML
40 INJECTION INTRAMUSCULAR; INTRAVENOUS ONCE
Status: COMPLETED | OUTPATIENT
Start: 2017-12-13 | End: 2017-12-13

## 2017-12-13 RX ORDER — LIDOCAINE HYDROCHLORIDE 10 MG/ML
30 INJECTION, SOLUTION EPIDURAL; INFILTRATION; INTRACAUDAL; PERINEURAL
Status: DISCONTINUED | OUTPATIENT
Start: 2017-12-13 | End: 2017-12-13 | Stop reason: HOSPADM

## 2017-12-13 RX ORDER — NITROGLYCERIN 5 MG/ML
100-200 VIAL (ML) INTRAVENOUS
Status: DISCONTINUED | OUTPATIENT
Start: 2017-12-13 | End: 2017-12-13 | Stop reason: HOSPADM

## 2017-12-13 RX ORDER — IRBESARTAN 75 MG/1
300 TABLET ORAL DAILY
Status: DISCONTINUED | OUTPATIENT
Start: 2017-12-14 | End: 2017-12-14 | Stop reason: HOSPADM

## 2017-12-13 RX ORDER — ARGATROBAN 1 MG/ML
150 INJECTION, SOLUTION INTRAVENOUS
Status: DISCONTINUED | OUTPATIENT
Start: 2017-12-13 | End: 2017-12-13 | Stop reason: HOSPADM

## 2017-12-13 RX ORDER — FENTANYL CITRATE 50 UG/ML
25-50 INJECTION, SOLUTION INTRAMUSCULAR; INTRAVENOUS
Status: ACTIVE | OUTPATIENT
Start: 2017-12-13 | End: 2017-12-14

## 2017-12-13 RX ORDER — TOBRAMYCIN AND DEXAMETHASONE 3; 1 MG/ML; MG/ML
1 SUSPENSION/ DROPS OPHTHALMIC 4 TIMES DAILY
Status: DISCONTINUED | OUTPATIENT
Start: 2017-12-13 | End: 2017-12-14 | Stop reason: HOSPADM

## 2017-12-13 RX ORDER — PROMETHAZINE HYDROCHLORIDE 25 MG/ML
6.25-25 INJECTION, SOLUTION INTRAMUSCULAR; INTRAVENOUS EVERY 4 HOURS PRN
Status: DISCONTINUED | OUTPATIENT
Start: 2017-12-13 | End: 2017-12-13 | Stop reason: HOSPADM

## 2017-12-13 RX ORDER — PHENYLEPHRINE HCL IN 0.9% NACL 1 MG/10 ML
20-100 SYRINGE (ML) INTRAVENOUS
Status: DISCONTINUED | OUTPATIENT
Start: 2017-12-13 | End: 2017-12-13 | Stop reason: HOSPADM

## 2017-12-13 RX ORDER — ASPIRIN 325 MG
325 TABLET ORAL
Status: DISCONTINUED | OUTPATIENT
Start: 2017-12-13 | End: 2017-12-13 | Stop reason: HOSPADM

## 2017-12-13 RX ORDER — POLYETHYLENE GLYCOL 3350 17 G/17G
17 POWDER, FOR SOLUTION ORAL 2 TIMES DAILY PRN
Status: DISCONTINUED | OUTPATIENT
Start: 2017-12-13 | End: 2017-12-14 | Stop reason: HOSPADM

## 2017-12-13 RX ORDER — PROTAMINE SULFATE 10 MG/ML
25-100 INJECTION, SOLUTION INTRAVENOUS EVERY 5 MIN PRN
Status: DISCONTINUED | OUTPATIENT
Start: 2017-12-13 | End: 2017-12-13 | Stop reason: HOSPADM

## 2017-12-13 RX ORDER — ATROPINE SULFATE 0.1 MG/ML
0.5 INJECTION INTRAVENOUS EVERY 5 MIN PRN
Status: ACTIVE | OUTPATIENT
Start: 2017-12-13 | End: 2017-12-14

## 2017-12-13 RX ORDER — EPTIFIBATIDE 2 MG/ML
2 INJECTION, SOLUTION INTRAVENOUS CONTINUOUS PRN
Status: DISCONTINUED | OUTPATIENT
Start: 2017-12-13 | End: 2017-12-13 | Stop reason: HOSPADM

## 2017-12-13 RX ORDER — PRASUGREL 10 MG/1
10-60 TABLET, FILM COATED ORAL
Status: DISCONTINUED | OUTPATIENT
Start: 2017-12-13 | End: 2017-12-13 | Stop reason: HOSPADM

## 2017-12-13 RX ORDER — FUROSEMIDE 10 MG/ML
20-100 INJECTION INTRAMUSCULAR; INTRAVENOUS
Status: COMPLETED | OUTPATIENT
Start: 2017-12-13 | End: 2017-12-13

## 2017-12-13 RX ORDER — HEPARIN SODIUM 10000 [USP'U]/100ML
0-3500 INJECTION, SOLUTION INTRAVENOUS CONTINUOUS
Status: DISCONTINUED | OUTPATIENT
Start: 2017-12-13 | End: 2017-12-13

## 2017-12-13 RX ORDER — TRAMADOL HYDROCHLORIDE 50 MG/1
50 TABLET ORAL EVERY 8 HOURS PRN
Status: DISCONTINUED | OUTPATIENT
Start: 2017-12-13 | End: 2017-12-14 | Stop reason: HOSPADM

## 2017-12-13 RX ORDER — METOPROLOL TARTRATE 25 MG/1
25 TABLET, FILM COATED ORAL 2 TIMES DAILY
Status: DISCONTINUED | OUTPATIENT
Start: 2017-12-14 | End: 2017-12-14 | Stop reason: HOSPADM

## 2017-12-13 RX ORDER — CLOPIDOGREL BISULFATE 75 MG/1
75 TABLET ORAL
Status: DISCONTINUED | OUTPATIENT
Start: 2017-12-13 | End: 2017-12-13 | Stop reason: HOSPADM

## 2017-12-13 RX ORDER — ARGATROBAN 1 MG/ML
350 INJECTION, SOLUTION INTRAVENOUS
Status: DISCONTINUED | OUTPATIENT
Start: 2017-12-13 | End: 2017-12-13 | Stop reason: HOSPADM

## 2017-12-13 RX ORDER — DIPHENHYDRAMINE HYDROCHLORIDE 50 MG/ML
25-50 INJECTION INTRAMUSCULAR; INTRAVENOUS
Status: DISCONTINUED | OUTPATIENT
Start: 2017-12-13 | End: 2017-12-13 | Stop reason: HOSPADM

## 2017-12-13 RX ORDER — NICARDIPINE HYDROCHLORIDE 2.5 MG/ML
100 INJECTION INTRAVENOUS
Status: DISCONTINUED | OUTPATIENT
Start: 2017-12-13 | End: 2017-12-13 | Stop reason: HOSPADM

## 2017-12-13 RX ORDER — FENTANYL CITRATE 50 UG/ML
25-50 INJECTION, SOLUTION INTRAMUSCULAR; INTRAVENOUS
Status: DISCONTINUED | OUTPATIENT
Start: 2017-12-13 | End: 2017-12-13 | Stop reason: HOSPADM

## 2017-12-13 RX ORDER — POTASSIUM CHLORIDE 29.8 MG/ML
20 INJECTION INTRAVENOUS
Status: DISCONTINUED | OUTPATIENT
Start: 2017-12-13 | End: 2017-12-13 | Stop reason: HOSPADM

## 2017-12-13 RX ORDER — VERAPAMIL HYDROCHLORIDE 2.5 MG/ML
1-5 INJECTION, SOLUTION INTRAVENOUS
Status: DISCONTINUED | OUTPATIENT
Start: 2017-12-13 | End: 2017-12-13 | Stop reason: HOSPADM

## 2017-12-13 RX ORDER — NITROGLYCERIN 20 MG/100ML
.07-2 INJECTION INTRAVENOUS CONTINUOUS PRN
Status: DISCONTINUED | OUTPATIENT
Start: 2017-12-13 | End: 2017-12-13 | Stop reason: HOSPADM

## 2017-12-13 RX ORDER — TOBRAMYCIN AND DEXAMETHASONE 3; 1 MG/ML; MG/ML
1 SUSPENSION/ DROPS OPHTHALMIC 4 TIMES DAILY
Status: ON HOLD | COMMUNITY
End: 2018-01-16

## 2017-12-13 RX ORDER — ACETAMINOPHEN 650 MG/1
650 SUPPOSITORY RECTAL EVERY 4 HOURS PRN
Status: DISCONTINUED | OUTPATIENT
Start: 2017-12-13 | End: 2017-12-14 | Stop reason: HOSPADM

## 2017-12-13 RX ORDER — NITROGLYCERIN 0.4 MG/1
0.4 TABLET SUBLINGUAL EVERY 5 MIN PRN
Status: DISCONTINUED | OUTPATIENT
Start: 2017-12-13 | End: 2017-12-13 | Stop reason: HOSPADM

## 2017-12-13 RX ORDER — CLOPIDOGREL BISULFATE 75 MG/1
300-600 TABLET ORAL
Status: DISCONTINUED | OUTPATIENT
Start: 2017-12-13 | End: 2017-12-13 | Stop reason: HOSPADM

## 2017-12-13 RX ORDER — NALOXONE HYDROCHLORIDE 0.4 MG/ML
.2-.4 INJECTION, SOLUTION INTRAMUSCULAR; INTRAVENOUS; SUBCUTANEOUS
Status: ACTIVE | OUTPATIENT
Start: 2017-12-13 | End: 2017-12-14

## 2017-12-13 RX ORDER — HYDRALAZINE HYDROCHLORIDE 20 MG/ML
10-20 INJECTION INTRAMUSCULAR; INTRAVENOUS
Status: DISCONTINUED | OUTPATIENT
Start: 2017-12-13 | End: 2017-12-13 | Stop reason: HOSPADM

## 2017-12-13 RX ORDER — MAGNESIUM HYDROXIDE 1200 MG/15ML
1000 LIQUID ORAL CONTINUOUS PRN
Status: DISCONTINUED | OUTPATIENT
Start: 2017-12-13 | End: 2017-12-13 | Stop reason: HOSPADM

## 2017-12-13 RX ORDER — ALUMINA, MAGNESIA, AND SIMETHICONE 2400; 2400; 240 MG/30ML; MG/30ML; MG/30ML
30 SUSPENSION ORAL EVERY 4 HOURS PRN
Status: DISCONTINUED | OUTPATIENT
Start: 2017-12-13 | End: 2017-12-14 | Stop reason: HOSPADM

## 2017-12-13 RX ORDER — HEPARIN SODIUM 1000 [USP'U]/ML
1000-10000 INJECTION, SOLUTION INTRAVENOUS; SUBCUTANEOUS EVERY 5 MIN PRN
Status: DISCONTINUED | OUTPATIENT
Start: 2017-12-13 | End: 2017-12-13 | Stop reason: HOSPADM

## 2017-12-13 RX ORDER — NALOXONE HYDROCHLORIDE 0.4 MG/ML
0.4 INJECTION, SOLUTION INTRAMUSCULAR; INTRAVENOUS; SUBCUTANEOUS EVERY 5 MIN PRN
Status: DISCONTINUED | OUTPATIENT
Start: 2017-12-13 | End: 2017-12-13 | Stop reason: HOSPADM

## 2017-12-13 RX ORDER — NITROGLYCERIN 0.4 MG/1
0.4 TABLET SUBLINGUAL EVERY 5 MIN PRN
Status: DISCONTINUED | OUTPATIENT
Start: 2017-12-13 | End: 2017-12-14 | Stop reason: HOSPADM

## 2017-12-13 RX ORDER — FLUMAZENIL 0.1 MG/ML
0.2 INJECTION, SOLUTION INTRAVENOUS
Status: ACTIVE | OUTPATIENT
Start: 2017-12-13 | End: 2017-12-14

## 2017-12-13 RX ORDER — IOPAMIDOL 755 MG/ML
44 INJECTION, SOLUTION INTRAVASCULAR ONCE
Status: COMPLETED | OUTPATIENT
Start: 2017-12-13 | End: 2017-12-13

## 2017-12-13 RX ORDER — ASPIRIN 81 MG/1
81 TABLET ORAL DAILY
Status: DISCONTINUED | OUTPATIENT
Start: 2017-12-14 | End: 2017-12-14 | Stop reason: HOSPADM

## 2017-12-13 RX ORDER — ADENOSINE 3 MG/ML
12-12000 INJECTION, SOLUTION INTRAVENOUS
Status: DISCONTINUED | OUTPATIENT
Start: 2017-12-13 | End: 2017-12-13 | Stop reason: HOSPADM

## 2017-12-13 RX ORDER — DOPAMINE HYDROCHLORIDE 160 MG/100ML
2-20 INJECTION, SOLUTION INTRAVENOUS CONTINUOUS PRN
Status: DISCONTINUED | OUTPATIENT
Start: 2017-12-13 | End: 2017-12-13 | Stop reason: HOSPADM

## 2017-12-13 RX ORDER — LOVASTATIN 20 MG
40 TABLET ORAL AT BEDTIME
Status: DISCONTINUED | OUTPATIENT
Start: 2017-12-14 | End: 2017-12-14 | Stop reason: HOSPADM

## 2017-12-13 RX ORDER — ASPIRIN 81 MG/1
324 TABLET, CHEWABLE ORAL ONCE
Status: DISCONTINUED | OUTPATIENT
Start: 2017-12-13 | End: 2017-12-14

## 2017-12-13 RX ORDER — NALOXONE HYDROCHLORIDE 0.4 MG/ML
.1-.4 INJECTION, SOLUTION INTRAMUSCULAR; INTRAVENOUS; SUBCUTANEOUS
Status: DISCONTINUED | OUTPATIENT
Start: 2017-12-13 | End: 2017-12-13

## 2017-12-13 RX ORDER — NALOXONE HYDROCHLORIDE 0.4 MG/ML
.1-.4 INJECTION, SOLUTION INTRAMUSCULAR; INTRAVENOUS; SUBCUTANEOUS
Status: DISCONTINUED | OUTPATIENT
Start: 2017-12-13 | End: 2017-12-14 | Stop reason: HOSPADM

## 2017-12-13 RX ORDER — LIDOCAINE 40 MG/G
CREAM TOPICAL
Status: DISCONTINUED | OUTPATIENT
Start: 2017-12-13 | End: 2017-12-14 | Stop reason: HOSPADM

## 2017-12-13 RX ORDER — PREDNISOLONE ACETATE 10 MG/ML
1 SUSPENSION/ DROPS OPHTHALMIC 2 TIMES DAILY
Status: DISCONTINUED | OUTPATIENT
Start: 2017-12-13 | End: 2017-12-14

## 2017-12-13 RX ORDER — LORAZEPAM 2 MG/ML
.5-2 INJECTION INTRAMUSCULAR EVERY 4 HOURS PRN
Status: DISCONTINUED | OUTPATIENT
Start: 2017-12-13 | End: 2017-12-13 | Stop reason: HOSPADM

## 2017-12-13 RX ORDER — METOPROLOL TARTRATE 1 MG/ML
5 INJECTION, SOLUTION INTRAVENOUS EVERY 5 MIN PRN
Status: DISCONTINUED | OUTPATIENT
Start: 2017-12-13 | End: 2017-12-13 | Stop reason: HOSPADM

## 2017-12-13 RX ORDER — POTASSIUM CHLORIDE 7.45 MG/ML
10 INJECTION INTRAVENOUS
Status: DISCONTINUED | OUTPATIENT
Start: 2017-12-13 | End: 2017-12-13 | Stop reason: HOSPADM

## 2017-12-13 RX ORDER — FLUMAZENIL 0.1 MG/ML
0.2 INJECTION, SOLUTION INTRAVENOUS
Status: DISCONTINUED | OUTPATIENT
Start: 2017-12-13 | End: 2017-12-13 | Stop reason: HOSPADM

## 2017-12-13 RX ORDER — IOPAMIDOL 755 MG/ML
62 INJECTION, SOLUTION INTRAVASCULAR ONCE
Status: COMPLETED | OUTPATIENT
Start: 2017-12-13 | End: 2017-12-13

## 2017-12-13 RX ORDER — EPTIFIBATIDE 2 MG/ML
180 INJECTION, SOLUTION INTRAVENOUS EVERY 10 MIN PRN
Status: DISCONTINUED | OUTPATIENT
Start: 2017-12-13 | End: 2017-12-13 | Stop reason: HOSPADM

## 2017-12-13 RX ORDER — EPINEPHRINE 1 MG/ML
0.3 INJECTION, SOLUTION, CONCENTRATE INTRAVENOUS
Status: DISCONTINUED | OUTPATIENT
Start: 2017-12-13 | End: 2017-12-13 | Stop reason: HOSPADM

## 2017-12-13 RX ORDER — ENALAPRILAT 1.25 MG/ML
1.25-2.5 INJECTION INTRAVENOUS
Status: DISCONTINUED | OUTPATIENT
Start: 2017-12-13 | End: 2017-12-13 | Stop reason: HOSPADM

## 2017-12-13 RX ORDER — ASPIRIN 81 MG/1
81 TABLET ORAL DAILY
Status: DISCONTINUED | OUTPATIENT
Start: 2017-12-14 | End: 2017-12-13

## 2017-12-13 RX ORDER — NITROGLYCERIN 0.4 MG/1
0.4 TABLET SUBLINGUAL EVERY 5 MIN PRN
Status: DISCONTINUED | OUTPATIENT
Start: 2017-12-13 | End: 2017-12-13

## 2017-12-13 RX ORDER — DEXTROSE MONOHYDRATE 25 G/50ML
12.5-5 INJECTION, SOLUTION INTRAVENOUS EVERY 30 MIN PRN
Status: DISCONTINUED | OUTPATIENT
Start: 2017-12-13 | End: 2017-12-13 | Stop reason: HOSPADM

## 2017-12-13 RX ORDER — ONDANSETRON 2 MG/ML
4 INJECTION INTRAMUSCULAR; INTRAVENOUS EVERY 4 HOURS PRN
Status: DISCONTINUED | OUTPATIENT
Start: 2017-12-13 | End: 2017-12-13 | Stop reason: HOSPADM

## 2017-12-13 RX ORDER — OFLOXACIN 3 MG/ML
1 SOLUTION/ DROPS OPHTHALMIC 4 TIMES DAILY
Status: ON HOLD | COMMUNITY
End: 2018-01-16

## 2017-12-13 RX ADMIN — NITROGLYCERIN 0.4 MG: 0.4 TABLET SUBLINGUAL at 12:20

## 2017-12-13 RX ADMIN — MIDAZOLAM 0.5 MG: 1 INJECTION INTRAMUSCULAR; INTRAVENOUS at 18:41

## 2017-12-13 RX ADMIN — HEPARIN SODIUM 950 UNITS/HR: 10000 INJECTION, SOLUTION INTRAVENOUS at 10:54

## 2017-12-13 RX ADMIN — FUROSEMIDE 40 MG: 10 INJECTION, SOLUTION INTRAVENOUS at 13:37

## 2017-12-13 RX ADMIN — IOPAMIDOL 62 ML: 755 INJECTION, SOLUTION INTRAVENOUS at 11:26

## 2017-12-13 RX ADMIN — FUROSEMIDE 40 MG: 10 INJECTION, SOLUTION INTRAVENOUS at 18:32

## 2017-12-13 RX ADMIN — PREDNISOLONE ACETATE 1 DROP: 10 SUSPENSION/ DROPS OPHTHALMIC at 22:40

## 2017-12-13 RX ADMIN — TOBRAMYCIN AND DEXAMETHASONE 1 DROP: 3; 1 SUSPENSION/ DROPS OPHTHALMIC at 23:38

## 2017-12-13 RX ADMIN — FUROSEMIDE 40 MG: 10 INJECTION, SOLUTION INTRAVENOUS at 10:33

## 2017-12-13 RX ADMIN — MIDAZOLAM 0.5 MG: 1 INJECTION INTRAMUSCULAR; INTRAVENOUS at 18:23

## 2017-12-13 RX ADMIN — IOPAMIDOL 44 ML: 755 INJECTION, SOLUTION INTRAVASCULAR at 18:45

## 2017-12-13 RX ADMIN — OFLOXACIN 1 DROP: 3 SOLUTION/ DROPS OPHTHALMIC at 23:38

## 2017-12-13 RX ADMIN — FENTANYL CITRATE 25 MCG: 50 INJECTION, SOLUTION INTRAMUSCULAR; INTRAVENOUS at 18:22

## 2017-12-13 RX ADMIN — SODIUM CHLORIDE, PRESERVATIVE FREE 91 ML: 5 INJECTION INTRAVENOUS at 11:27

## 2017-12-13 RX ADMIN — HUMAN ALBUMIN MICROSPHERES AND PERFLUTREN 6 ML: 10; .22 INJECTION, SOLUTION INTRAVENOUS at 09:57

## 2017-12-13 RX ADMIN — FENTANYL CITRATE 25 MCG: 50 INJECTION, SOLUTION INTRAMUSCULAR; INTRAVENOUS at 18:41

## 2017-12-13 ASSESSMENT — ACTIVITIES OF DAILY LIVING (ADL)
SWALLOWING: 0 - SWALLOWS FOODS/LIQUIDS WITHOUT DIFFICULTY
BATHING: 0 - INDEPENDENT
TRANSFERRING: 0-->INDEPENDENT
TRANSFERRING: 0 - INDEPENDENT
RETIRED_COMMUNICATION: 0-->UNDERSTANDS/COMMUNICATES WITHOUT DIFFICULTY
TOILETING: 0-->INDEPENDENT
CHANGE_IN_FUNCTIONAL_STATUS_SINCE_ONSET_OF_CURRENT_ILLNESS/INJURY: NO
DRESS: 0-->INDEPENDENT
SWALLOWING: 0-->SWALLOWS FOODS/LIQUIDS WITHOUT DIFFICULTY
RETIRED_EATING: 0-->INDEPENDENT
FALL_HISTORY_WITHIN_LAST_SIX_MONTHS: YES
NUMBER_OF_TIMES_PATIENT_HAS_FALLEN_WITHIN_LAST_SIX_MONTHS: 1
COGNITION: 0 - NO COGNITION ISSUES REPORTED
TOILETING: 0 - INDEPENDENT
EATING: 0 - INDEPENDENT
DRESS: 0 - INDEPENDENT
AMBULATION: 0-->INDEPENDENT
BATHING: 0-->INDEPENDENT
AMBULATION: 0 - INDEPENDENT
COMMUNICATION: 0 - UNDERSTANDS/COMMUNICATES WITHOUT DIFFICULTY

## 2017-12-13 ASSESSMENT — ENCOUNTER SYMPTOMS
ARTHRALGIAS: 0
FEVER: 0
SHORTNESS OF BREATH: 1
EYE REDNESS: 0
DIFFICULTY URINATING: 0
HEADACHES: 0
CHEST TIGHTNESS: 1
COLOR CHANGE: 0
CONFUSION: 0
NECK STIFFNESS: 0
ABDOMINAL PAIN: 0

## 2017-12-13 NOTE — PROGRESS NOTES
Social Work: Assessment with Discharge Plan    Patient Name:  Dimple Oviedo  :  1933  Age:  84 year old  MRN:  [id number]  Risk/Complexity Score:     Completed assessment with:  MD Amandeep    Presenting Information   Reason for Referral:  Discharge plan  Date of Intake:  2017  Referral Source:  Chart Review  Decision Maker:  Pt  Alternate Decision Maker:  Per [employer] policy, Pt's son would be her surrogate decision-maker: Issa Oviedo, [phone].  Health Care Directive:  Not on file  Living Situation:  Apartment; Pt lives alone in a 5th floor apartment with elevator access.  Previous Functional Status:  Independent; Pt has a cane that she uses for support when her stenosis flares. Pt is otherwise independent with ambulation and self-care. She had been driving prior to her cataract surgery and is awaiting clearance to drive again. Pt's daughter-in-law provides transport as needed.  Patient and family understanding of hospitalization:  Pt presented to the ED with shortness of breath and chest tightness.   Cultural/Language/Spiritual Considerations:  Pt's primary language is English.  Adjustment to Illness:  Pt is anxious about having to stay in the hospital because she has a planned visit from her son-in-law and grandsons. They live in [place] and are coming to stay with Pt for the weekend. She is concerned about preparing for the visit. Pt's local daughter-in-law is available to assist.    Physical Health  Reason for Admission:    1. Elevated troponin    2. ACS (acute coronary syndrome) (H)    3. Subendocardial infarction (H)      Services Needed/Recommended:  Home with no services; pending further workup.    Support System  Significant relationship at present time:  Son, daughter-in-law, daughter, son-in-law, grandsons.  Family of origin is available for support:  Yes, Pt's son and daughter-in-law live nearby and are supportive.   Other support available:  Pt is in regular contact with her  family out of town and has visited them three times in the last year due to various events.  Gaps in support system:  None noted.  Patient is caregiver to:  None     Provider Information   Primary Care Physician:  Pop Zapien   [phone]   Clinic:  [facility] [place] [address] / [facility] [address]      :  None    Financial   Income Source:  Retired  Financial Concerns:  None noted  Insurance:    Payor/Plan Subscriber Name Rel Member # Group #   MEDICARE - MEDICARE QUIN VALDEZ  [id number]       [employer] CLAIMS, [address]   COMMERCIAL - AARP QUIN VALDEZ  [id number]       [facility] CLAIM DIV, [address]       Discharge Plan   Patient and family discharge goal:  Pt hopes to return home tomorrow.  Provided education on discharge plan:  Pending further workup  Patient agreeable to discharge plan:  Pending recommendations. Pt is focused on returning home tomorrow.  A list of Medicare Certified Facilities was provided to the patient and/or family to encourage patient choice. Patient's choices for facility are:  N/A  Will NH provide Skilled rehabilitation or complex medical:  N/A  General information regarding anticipated insurance coverage and possible out of pocket cost was discussed. Patient and patient's family are aware patient may incur the cost of transportation to the facility, pending insurance payment: N/A  Barriers to discharge:  Medical clearance    Discharge Recommendations   Anticipated Disposition:  Home, no needs identified; pending further workup  Transportation Needs:  Family:  Pt's daughter-in-law will provide transportation.  Name of Transportation Company and Phone:  N/A    Additional comments   Pt is an 85 y/o female who presented to the ED with shortness of breath and chest tightness. Pt is anticipating a procedure today and hopes to be home by tomorrow. Pt's son-in-law and two adult grandsons will be coming into town Saturday morning and staying with Pt for the  weekend. Pt had anticipated a family holiday weekend and wants to be home to prepare the gurinder and make other preparations. She is very anxious about not being home. Writer spent time offering support and encouragement. Pt states that her daughter-in-law is very helpful and will be available to her as needed.        Olinda Humphreys [facility]  Emergency Department   Pager: [phone]

## 2017-12-13 NOTE — PLAN OF CARE
"Problem: Cardiac Catheterization (Diagnostic/Interventional) (Adult)  Goal: Signs and Symptoms of Listed Potential Problems Will be Absent, Minimized or Managed (Cardiac Catheterization)  Signs and symptoms of listed potential problems will be absent, minimized or managed by discharge/transition of care (reference Cardiac Catheterization (Diagnostic/Interventional) (Adult) CPG).  D/Admitted from the ER with current thyroid \"problems\" and recent cataract surgery that had to be redone due to staph infection.per her report, and has reflux She also reports she has chronic constipation. Came to ER for SOB, sats 80%, /120. The ambulance gave Aspirin and 2 NTG and ER reports they gave one NTG and NTG drip was ordered but not started.Ct negative for PE. BNP is 1199, and trop is 1.3. She arrived on Heparin drip and labs were drawn. CCL just came to bring her to cath lab  1910 clean cors, med mgmt, #4 sheath out in CCL  P/monitor for changes  A/groin is flat and soft. CCL gave Lasix dose-voided large amount on bedpan. Ate well.   D/Had large amount of stool in round BM's when off bedrest  I/brief nausea-gave gingerale with immediate relief of nausea  D/wanted PIV in right thumb removed as it is in the way to eat and to wipe herself in the bathroom, and to wash up-removed.   ---thyroid--  D/says she still feels nervous and tries to sleep and then wakes up, and feels anxious and shaky and her MD is monitoring this  P/expect d/c in the am  ---VT---  D/much of the night was sinus with frequent bigeminy PVC's. 2215 MT called with 17 beats of VT rate 160, pt was sleeping, vitals right after this are recorded  I/called team to update them  P/monitor for changes  ---eye drops--  D/many calls to pharmacy to get 8 pm eye drops up here  I/finally arrived and given  D/then, she tells me that she normally takes 3 sets of eye drops at bedtime  I/called MD to get orders for the other eye drops as they were never ordered  P/call " pharmacy to see if we can get them up here by midnight

## 2017-12-13 NOTE — H&P
History and Physical     Dimple Oviedo  MRN# [id number]        Date of Admission:  12/13/2017    CC: Chest tightness, SOB    HPI: Dimple Oviedo is an 84 year old female with PMHx of HLD, HTN, multinodular goiter, GERD, Hyperthyroidism who presents to [facility] with chest pain and SOB and found to have elevated Troponin. Patient HTN in the ED with  initially. Chest pain described as left lateral, occasional radiation into left arm, tightness and pressure, relieved with Nitro and ASA.  Echo pending. No ischemic changes on EKG. BNP WNL.     Past Medical History:  Past Medical History:   Diagnosis Date     Calculus of kidney      Esophageal reflux      Hyperlipidemia LDL goal <130 5/9/2010     Malignant melanoma of skin of trunk, except scrotum (H)      Nonspecific abnormal finding     has living will 2004 -      Nontoxic multinodular goiter     no further eval /tx rec per pt     Osteopenia      Other psoriasis      Personal history of colonic polyps      PMR (polymyalgia rheumatica) (H)      Undiagnosed cardiac murmurs      Unspecified constipation      Unspecified essential hypertension        Past Surgical History:  Past Surgical History:   Procedure Laterality Date     C NONSPECIFIC PROCEDURE  2005    colonoscopy polyp repeat 2010     ENDOSCOPIC ULTRASOUND LOWER GASTROINTESTIONAL TRACT (GI) N/A 10/30/2015    Procedure: ENDOSCOPIC ULTRASOUND LOWER GASTROINTESTIONAL TRACT (GI);  Surgeon: Daniel Jean-Baptiste MD;  Location: [facility] OR     LAPAROSCOPIC CHOLECYSTECTOMY WITH CHOLANGIOGRAMS N/A 11/1/2015    Procedure: LAPAROSCOPIC CHOLECYSTECTOMY WITH CHOLANGIOGRAMS;  Surgeon: Tonie Warren MD;  Location: [facility] OR     SURGICAL HISTORY OF -   1996    malignant melanoma     SURGICAL HISTORY OF -   1968    thyroid nodule     SURGICAL HISTORY OF -       D & C       Allergies:     Allergies   Allergen Reactions     No Known Drug Allergies        Medications:    No current facility-administered medications on file  "prior to encounter.   Current Outpatient Prescriptions on File Prior to Encounter:  lovastatin (MEVACOR) 40 MG tablet TAKE 1 TABLET AT BEDTIME (HYPERLIPIDEMIA LDL GOAL  BELOW 130)   omeprazole (PRILOSEC) 20 MG CR capsule Take 1 capsule (20 mg) by mouth daily   traMADol (ULTRAM) 50 MG tablet Take 1-2 tablets ( mg) by mouth every 8 hours as needed for pain maximum 2 tablet(s) per day   alendronate (FOSAMAX) 70 MG tablet TAKE 1 TABLET EVERY 7 DAYS AT LEAST 60 MIN BEFORE BREAKFAST AS DIRECTED \"SEE PACKAGE FOR ADDITIONAL INSTRUCTIONS\"   alendronate (FOSAMAX) 70 MG tablet TAKE 1 TABLET EVERY 7 DAYS AT LEAST 60 MIN BEFORE BREAKFAST AS DIRECTED \"SEE PACKAGE FOR ADDITIONAL INSTRUCTIONS\"   irbesartan (AVAPRO) 300 MG tablet TAKE 1 TABLET EVERY DAY   acetaminophen 650 MG TABS Take 650 mg by mouth every 8 hours as needed for mild pain or fever   polyethylene glycol (MIRALAX/GLYCOLAX) packet Take 17 g by mouth 2 times daily as needed for constipation   Omega-3 Fatty Acids (FISH OIL) 500 MG CAPS Take 1 capsule by mouth 3 times daily   prednisoLONE acetate (PRED FORTE) 1 % ophthalmic suspension Place 1 drop into both eyes 2 times daily   melatonin 3 MG tablet Take 1 tablet (3 mg) by mouth nightly as needed for sleep   cycloSPORINE (RESTASIS) 0.05 % ophthalmic emulsion Place 1 drop into both eyes 2 times daily   ASPIRIN 81 MG OR TABS ONE DAILY (Patient taking differently: ONE DAILY at bedtime)   CALCIUM 500 + D 500-200 MG-IU OR TABS 1 tablets 3 times daily       Social History:  Social History     Social History     Marital status:      Spouse name:      Number of children: 2     Years of education: N/A     Occupational History      Retired     Social History Main Topics     Smoking status: Never Smoker     Smokeless tobacco: Never Used     Alcohol use No      Comment: 6 times per year-one drink     Drug use: No     Sexual activity: Yes     Partners: Male     Other Topics Concern      Service No     Blood " Transfusions No     Exercise Yes     curves     Seat Belt Yes     Self-Exams Yes     Parent/Sibling W/ Cabg, Mi Or Angioplasty Before 65f 55m? No     Social History Narrative    December 7, 2009    Balanced Diet - Yes    Osteoporosis Prevention Measures - Dairy servings per day: 2 and Medication/Supplements (See current meds)    Regular Exercise -  Yes Describe curves, walking    Dental Exam - YES - Date: 10/2009    Eye Exam - YES - Date: 2008    Self Breast Exam - Yes    Abuse: Current or Past (Physical, Sexual or Emotional)- No    Do you feel safe in your environment - Yes    Guns stored in the home - No    Sunscreen used - Yes    Seatbelts used - Yes    Lipids -  YES - Date: 11/24/2009    Glucose -  YES - Date: 11/24/2009    Colon Cancer Screening - Colonoscopy 11/18/2005(date completed)    Hemoccults - YES - Date: 10/12/2004    Pap Test -  YES - Date: 09/22/2004    Do you have any concerns about STD's -  No    Mammography - YES - Date: 11/24/2009    DEXA - YES - Date: 11/20/2008    Immunizations reviewed and up to date - Yes    PAULETTE Spencer, [facility]                   Family History:  Family History   Problem Relation Age of Onset     CANCER Father      dec - esophageal and laryngeal     HEART DISEASE Mother      Respiratory Mother      dec       ROS:  Negative besides that noted in HPI    Exam:  Temp:  [96.8  F (36  C)] 96.8  F (36  C)  Pulse:  [82-90] 82  Heart Rate:  [82-87] 82  Resp:  [16-26] 21  BP: (120-156)/(51-85) 123/56  SpO2:  [98 %-100 %] 99 %  General: Alert, interactive, NAD  Eyes: sclera anicteric, EOMI  Resp: Bibasilar crackles, good air exchange  Cardiovascular: regular rate and rhythm, no murmur, +1 edema  GI: Soft, nontender, nondistended. +BS.  No HSM or masses, no rebound or guarding.  MSK: Generalized weakness throughout, though equal bilaterally  Skin: Warm and dry, no jaundice or rash  Neuro: Alert & oriented x 3, Cns 2-12 intact, moves all extremities equally  Psych: anxious, unable to answer  questions at this time but A and O.      Data:  CMP  Recent Labs  Lab 17  0809      POTASSIUM 3.8   CHLORIDE 110*   CO2 24   ANIONGAP 7   *   BUN 23   CR 0.65   GFRESTIMATED 87   GFRESTBLACK >90   SHREYA 8.7   PROTTOTAL 6.8   ALBUMIN 3.0*   BILITOTAL 0.5   ALKPHOS 74   AST 76*   ALT 85*     CBC  Recent Labs  Lab 17  1036 17  0809   WBC 8.3 7.4   RBC 4.00 4.09   HGB 11.6* 11.9   HCT 37.2 38.0   MCV 93 93   MCH 29.0 29.1   MCHC 31.2* 31.3*   RDW 15.5* 15.5*    295       Lab Results   Component Value Date    TROPI 0.113 (H) 2017    TROPI  10/30/2015     <0.015  The 99th percentile for upper reference range is 0.045 ug/L.  Troponin values in   the range of 0.045 - 0.120 ug/L may be associated with risks of adverse   clinical events.      TROPI  10/29/2015     <0.015  The 99th percentile for upper reference range is 0.045 ug/L.  Troponin values in   the range of 0.045 - 0.120 ug/L may be associated with risks of adverse   clinical events.      TROPONIN 0.06 2017    TROPONIN 0.00 10/29/2015       Recent Results (from the past 4320 hour(s))   ECHO COMPLETE WITH OPTISON    Narrative    [id number]  [id number]  [id number]  [id number]^RETA^RICK^E           [facility],[place]  Echocardiography Laboratory  [address] [place] [id number]     Name: QUIN VALDEZ  MRN: [id number]  : 1933  Study Date: 2017 09:39 AM  Age: 84 yrs  Gender: Female  Patient Location: [facility]  Reason For Study: Dyspnea  Ordering Physician: RICK MCDUFFIE  Performed By: ANIBAL See     BSA: 1.7 m2  Height: 58 in  Weight: 176 lb  BP: 131/85 mmHg  _____________________________________________________________________________  __        Procedure  Echocardiogram with two-dimensional, color and spectral Doppler performed.  Contrast Optison. Optison (NDC #7717-5937-67) given intravenously. Patient was  given 6.0 ml mixture of 3 ml Optison and 6 ml saline. 3.0 ml  wasted.  _____________________________________________________________________________  __        Interpretation Summary  Technically difficult study due to poor acoustic windows.     Normal left ventricular size with mild concentric hypertrophy.  Global akinesis with sparing of the basal segments consistent with stress  cardiomyopathy versus multivessel coronary artery disease. Moderately reduced  systolic function. EF 30-35%.  Normal right ventricular size and function.  Severe left atrial enlargement.  No pericardial effusion.     Compared to the prior study 10/31/15 the wall motion abnormalities and LV  dysfunction are new.  _____________________________________________________________________________  __        Left Ventricle  Left ventricular size is normal. Mild concentric wall thickening consistent  with left ventricular hypertrophy is present. Moderately (EF 30-35%) reduced  left ventricular function is present. Global akinesis with sparing of the  basal segments consistent with stress cardiomyopathy versus multivessel  coronary artery disease.     Right Ventricle  The right ventricle is normal size. Global right ventricular function is  normal.     Atria  The right atria appears normal. Severe left atrial enlargement is present.        Mitral Valve  The mitral valve is normal. Trace mitral insufficiency is present.     Aortic Valve  Moderate aortic valve calcification. Mild aortic insufficiency is present.  Mild aortic stenosis is present.     Tricuspid Valve  The tricuspid valve is normal. Trace tricuspid insufficiency is present. The  peak velocity of the tricuspid regurgitant jet is not obtainable.     Pulmonic Valve  The pulmonic valve is normal. Trace pulmonic insufficiency is present.     Vessels  The aorta root is normal. The inferior vena cava was normal in size with  preserved respiratory variability. Estimated mean right atrial pressure is 3  mmHg.     Pericardium  No pericardial effusion is  present.        Attestation  I have personally viewed the imaging and agree with the interpretation and  report as documented by the fellow, Chencho Chanel, and/or edited by me.  _____________________________________________________________________________  __     MMode/2D Measurements & Calculations  IVSd: 1.3 cm  LVIDd: 4.7 cm  LVIDs: 3.4 cm  LVPWd: 1.2 cm  FS: 27.4 %  EDV(Teich): 101.3 ml  ESV(Teich): 47.4 ml  LV mass(C)d: 219.4 grams  LV mass(C)dI: 127.2 grams/m2  Ao root diam: 2.5 cm  LVOT diam: 2.2 cm  LVOT area: 3.8 cm2     EF(MOD-bp): 31.6 %  LA Volume (BP): 88.7 ml  LA Volume Index (BP): 51.6 ml/m2     RWT: 0.51        Doppler Measurements & Calculations  MV E max tati: 127.8 cm/sec  Ao V2 max: 212.3 cm/sec  Ao max P.0 mmHg  Ao V2 mean: 147.3 cm/sec  Ao mean PG: 10.0 mmHg  Ao V2 VTI: 40.1 cm  PAUL(I,D): 1.7 cm2  PAUL(V,D): 1.7 cm2  LV V1 max PG: 3.6 mmHg  LV V1 max: 95.3 cm/sec  LV V1 VTI: 18.2 cm  SV(LVOT): 69.2 ml  SI(LVOT): 40.1 ml/m2  PAUL Index (cm2/m2): 1.0  E/E' av.7  Lateral E/e': 25.6  Medial E/e': 23.8        _____________________________________________________________________________  __        Report approved by: Ade Razo 2017 10:42 AM          EKG: SR, rates 80's, LVH, LAD, Q's anterior leads.       Assessment/ Plan:  Dimple Oviedo is an 84 year old female with PMHx of HLD, HTN, multinodular goiter, GERD, Hyperthyroidism who presents to [facility] with chest pain and SOB, found to have elevated troponin.      # NSTEMI. Chest pain worsening with increasing Troponins. No ischemic EKG changes.   -- Echocardiogram reveals reduced EF, 30%, septal thickening and global hypokinesis.   -- LVEDP and Cor angio this afternoon, given ongoing chest pain and increasing Troponin's  -- Trend Troponin's to peak  -- Tele  -- Begin Heparin and Nitro gtt  -- Add on D dimer, CT r/o PE in ED negative     # SOB   # FVO  -- Diurese, 40 IV lasix x 2, currently on decreased o2 with sats 99%. Afebrile. No  leukocytosis.   -- PRN bipap  -- CXR with pulmonary congestion, CT negative for PE but with bilateral Pl. effusions. Lasix as above, LHC, as above, with frequent reassessment.     # HTN- Currently well controlled  - Continue Irbesartan, as PTA as tolerated  - Add Metop as tolerated.    # Hyperthyroidism-   -- Continue BB as BP tolerates.     # HLD  - Continue PTA statin    NELSON Echeverria  Interventional Cardiology-I  Pager [phone]

## 2017-12-13 NOTE — IP AVS SNAPSHOT
"                  MRN:[id number]                      After Visit Summary   12/13/2017    Dimple Oviedo    MRN: [id number]           Thank you!     Thank you for choosing [facility] for your care. Our goal is always to provide you with excellent care. Hearing back from our patients is one way we can continue to improve our services. Please take a few minutes to complete the written survey that you may receive in the mail after you visit with us. Thank you!        Patient Information     Date Of Birth          6/23/1933        Designated Caregiver       Most Recent Value    Caregiver    Will someone help with your care after discharge? no      About your hospital stay     You were admitted on:  December 13, 2017 You last received care in the:  [address] [facility]    You were discharged on:  December 14, 2017        Reason for your hospital stay       You were experiencing chest pain for which you underwent a coronary angiogram and were found to have normal coronary arteries, however the squeeze function of your heart was moderately reduced. We have added a medication and ask that you follow up with our \"[facility]\" clinic NP within a week.                  Who to Call     For medical emergencies, please call 911.  For non-urgent questions about your medical care, please call your primary care provider or clinic, [phone]          Attending Provider     Provider Specialty    Kade Ty MD Emergency Medicine    Myra Brothers MD Emergency Medicine    Eliud Apodaca MD Cardiology       Primary Care Provider Office Phone # Fax #    Pop Antonino Zapien [phone]      After Care Instructions     Activity       Your activity upon discharge: activity as tolerated            Diet       Follow this diet upon discharge: Orders Placed This Encounter      Regular Diet Adult                  Follow-up Appointments     Adult [facility] Follow-up and recommended labs and tests       Follow up " with primary care provider, Pop Zapien MD, within 7 days for hospital follow- up, fluid status assessment and blood sugar control/hgb A1c.  Basic met and CBC recommended    Please follow up with CORE clinic in one week.    Appointments on [place] and/or [facility] (with [facility] or [facility] provider or service). Call [phone] if you haven't heard regarding these appointments within 7 days of discharge.                  Your next 10 appointments already scheduled     Dec 18, 2017  1:00 PM CST   US THYROID with UUUS2   [facility]    [address] [id number] [phone]           Please bring a list of your medicines (including vitamins, minerals and over-the-counter drugs). Also, tell your doctor about any allergies you may have. Wear comfortable clothes and leave your valuables at home.  You do not need to do anything special to prepare for your exam.  Please call the Imaging Department at your exam site with any questions.            Dec 18, 2017  1:30 PM CST   (Arrive by 1:15 PM)   NM THYroid with UUNMINJ2   [facility]    [address] [id number] [phone]            Dec 19, 2017  1:30 PM CST   (Arrive by 1:15 PM)   NM THYROID UPTAKE AND SCAN with UUNM2   [facility]    [address] [id number] [phone]           If you take a thyroid hormone, you should stop taking it 3 to 6 weeks before your test or treatment. Your doctor will tell you when to stop taking it.  For 3 to 4 weeks before your test or treatment, avoid foods or medicines that contain large amounts of iodine. These include seafood, iodized salt, cold medicine and IV dye (used during medical exams). Tell your doctor if you ve  had any of these in the last two months.  For an uptake and scan: Stop eating 4 hours before your first visit. You may drink water.  For thyroid therapy: Stop all food and drink (including water) 1 hour before your first visit.  Women of child-bearing age: Tell your doctor if you are breastfeeding or if there s any chance you may be pregnant. If you will have thyroid therapy, you must have a pregnancy test within 48 hours.  Please bring a list of your medicines to the hospital. Include vitamins, minerals and over-the-counter drugs.  You should wear loose clothing, such as a sweat suit or jogging clothes. We may ask you to undress and put on a hospital gown.  You may need to remove your watch and any jewelry. It is safest to leave personal items at home.            Dec 22, 2017  9:00 AM CST   Lab with  LAB   [facility] ([facility])    [address]  1st Floor  [facility] [address] [phone]            Dec 22, 2017  9:30 AM CST   (Arrive by 9:15 AM)   RETURN HEART TRANSPLANT with Brianna Larkin NP   [facility] ([facility])    [address]  3rd Floor  [facility] [id number] [phone]              Further instructions from your care team         Takotsubo Cardiomyopathy (Broken Heart Syndrome)    Takotsubo cardiomyopathy (TCM) is a type of heart condition. It causes sudden chest pain. The symptoms of TCM can be like those of a heart attack.  With TCM, blood flow to part of the heart is briefly blocked. This might happen if the coronary arteries have a temporary spasm. It might also occur if the smaller blood vessels of the heart don t get enough blood. Although the symptoms of TCM may feel like a heart attack, the two conditions are different. During a heart attack, a major blockage in one of the coronary arteries from a blood clot or plaque buildup triggers symptoms. In TCM, the blockage is transient.  TCM is not very  common. It most often occurs in older women. But it can happen to men and younger women.  What causes Takotsubo cardiomyopathy?  Experts are still trying to understand what causes TCM. Some think it might result from a brief spasm of the coronary arteries. Others think reduced blood flow to the smaller blood vessels of the heart may trigger it. Excess release of stress hormones such as adrenaline may also play a role.  Experts do know that intense emotions such as grief, fear, or sadness may trigger TCM. That s why the condition is sometimes called broken heart syndrome. A sudden illness may also precede it. TCM might be triggered by:    Death of a loved one    Domestic abuse    Natural disasters    Major financial loss    Asthma flare    Surgery    Chemotherapy    Accidental overdose of adrenaline    Adrenaline-producing tumor  In some cases, experts can t pinpoint a clear cause for TCM.  You may have a higher risk for TCM if one of your family members had it. Having an anxiety disorder also seems to raise a person s risk. Traditional risk factors for a heart attack, such as smoking, do not make you more prone to TCM.  What are the symptoms of Takotsubo cardiomyopathy?  Symptoms of TCM may resemble a heart attack. During an episode, you might have:    Sudden, strong chest pain (the most common symptom)    Neck or left arm pain    Shortness of breath    Fainting, dizziness, or lightheadedness    Pale appearance    Sudden, severe fatigue    Sweating  How is Takotsubo cardiomyopathy treated?  Since TCM may mimic an acute heart attack, it may be treated like a heart attack. This may include aspirin, nitroglycerin, blood thinners, and even heart procedures to look at blood flow in the heart arteries. Part of the diagnosis for symptoms may include an echocardiogram or ultrasound of your heart and a coronary angiogram to make sure you do not have a blocked coronary artery. Blood samples will be checked to evaluate the  "stress on your heart.  You may need to stay in the intensive care unit (ICU) for at least 24 hours. Treatment might include:    Oxygen therapy to increase oxygen in your blood    Intravenous (IV) fluids if your doctor thinks you are dehydrated    Beta-blocker drugs to help aid heart recovery    Angiotensin converting enzyme (ACE) inhibitor drugs to aid heart recovery    Blood-thinner medicines (anticoagulants) to help prevent stroke    Psychological therapy to address problems such as anxiety and stress  Most people fully recover from TCM in 1 to 4 weeks. However, some do not, especially if they are older in age. Short-term risks of heart failure and abnormal heart rhythm can also pose danger to people with TCM especially early on in the disease.  When to call your healthcare provider  If you have sudden severe chest pain or other signs of a heart attack or TCM, go to the Emergency Department or call 911.   Date Last Reviewed: 12/1/2016 2000-2017 [employer]. [address] [place] [address]. All rights reserved. This information is not intended as a substitute for professional medical care. Always follow your healthcare professional's instructions.          Pending Results     Date and Time Order Name Status Description    12/14/2017 1004 Hemoglobin A1c In process     12/14/2017 0715 EKG 12-lead, tracing only Preliminary             Statement of Approval     Ordered          12/14/17 1248  I have reviewed and agree with all the recommendations and orders detailed in this document.  EFFECTIVE NOW     Approved and electronically signed by:  Roxi Lowe APRN CNP             Admission Information     Date & Time Provider Department Dept. Phone    12/13/2017 Eliud Apodaca MD [address] [facility] [phone]      Your Vitals Were     Blood Pressure Pulse Temperature Respirations Height Weight    128/53 (BP Location: Right arm) 82 98.3  F (36.8  C) (Oral) 16 1.473 m (4' 10\") 72.2 kg " (159 lb 3.2 oz)    Pulse Oximetry BMI (Body Mass Index)                94% 33.27 kg/m2          [employer] Information     [employer] gives you secure access to your electronic health record. If you see a primary care provider, you can also send messages to your care team and make appointments. If you have questions, please call your primary care clinic.  If you do not have a primary care provider, please call [phone] and they will assist you.        Care EveryWhere ID     This is your Care EveryWhere ID. This could be used by other organizations to access your [facility] medical records  [id number]        Equal Access to Services     [facility] : Hadkiana Pollack, nadia barnard, henrique chávez, maldonado seay . So [facility] [phone].    ATENCIÓN: Si habla español, tiene a sierra disposición servicios gratuitos de asistencia lingüística. Llame al [phone].    We comply with applicable federal civil rights laws and [place] laws. We do not discriminate on the basis of race, color, national origin, age, disability, sex, sexual orientation, or gender identity.               Review of your medicines      START taking        Dose / Directions    furosemide 20 MG tablet   Commonly known as:  LASIX   Used for:  Elevated troponin        Dose:  20 mg   Take 1 tablet (20 mg) by mouth daily   Quantity:  30 tablet   Refills:  0       nitroGLYcerin 0.4 MG sublingual tablet   Commonly known as:  NITROSTAT   Used for:  Elevated troponin        For chest pain place 1 tablet under the tongue every 5 minutes for 3 doses. If symptoms persist 5 minutes after 1st dose call 911.   Quantity:  25 tablet   Refills:  3         CONTINUE these medicines which may have CHANGED, or have new prescriptions. If we are uncertain of the size of tablets/capsules you have at home, strength may be listed as something that might have changed.        Dose / Directions    aspirin 81 MG tablet   This may  "have changed:  See the new instructions.   Used for:  Routine medical exam        ONE DAILY   Quantity:  100   Refills:  3         CONTINUE these medicines which have NOT CHANGED        Dose / Directions    acetaminophen 650 MG 8 hour tablet   Used for:  Ascending cholangitis        Dose:  650 mg   Take 650 mg by mouth every 8 hours as needed for mild pain or fever   Quantity:  100 tablet   Refills:  0       alendronate 70 MG tablet   Commonly known as:  FOSAMAX   Used for:  High risk medication use, Osteopenia        TAKE 1 TABLET EVERY 7 DAYS AT LEAST 60 MIN BEFORE BREAKFAST AS DIRECTED \"SEE PACKAGE FOR ADDITIONAL INSTRUCTIONS\"   Quantity:  12 tablet   Refills:  2       CALCIUM 500 + D 500-200 MG-IU Tabs        1 tablets 3 times daily   Refills:  0       CRANBERRY CONCENTRATE PO        Dose:  1 capsule   Take 1 capsule by mouth 2 times daily   Refills:  0       Fish Oil 500 MG Caps        Dose:  1 capsule   Take 1 capsule by mouth 3 times daily   Refills:  0       irbesartan 300 MG tablet   Commonly known as:  AVAPRO   Used for:  Essential hypertension with goal blood pressure less than 140/90        TAKE 1 TABLET EVERY DAY   Quantity:  90 tablet   Refills:  0       lovastatin 40 MG tablet   Commonly known as:  MEVACOR   Used for:  Hyperlipidemia LDL goal <130        TAKE 1 TABLET AT BEDTIME (HYPERLIPIDEMIA LDL GOAL  BELOW 130)   Quantity:  90 tablet   Refills:  2       melatonin 3 MG tablet   Used for:  Insomnia        Dose:  3 mg   Take 1 tablet (3 mg) by mouth nightly as needed for sleep   Quantity:  90 tablet   Refills:  0       ofloxacin 0.3 % ophthalmic solution   Commonly known as:  OCUFLOX        Dose:  1 drop   Place 1 drop into the right eye 4 times daily   Refills:  0       omeprazole 20 MG CR capsule   Commonly known as:  priLOSEC   Used for:  Gastroesophageal reflux disease without esophagitis        Dose:  20 mg   Take 1 capsule (20 mg) by mouth daily   Quantity:  180 capsule   Refills:  3       " polyethylene glycol Packet   Commonly known as:  MIRALAX/GLYCOLAX   Used for:  Other constipation        Dose:  17 g   Take 17 g by mouth 2 times daily as needed for constipation   Quantity:  7 packet   Refills:  0       prednisoLONE acetate 1 % ophthalmic susp   Commonly known as:  PRED FORTE        Dose:  1 drop   Place 1 drop into the right eye 4 times daily Taper as directed   Refills:  0       propranolol 60 MG 24 hr capsule   Commonly known as:  INDERAL LA        Dose:  60 mg   Take 60 mg by mouth daily   Refills:  0       RESTASIS 0.05 % ophthalmic emulsion   Generic drug:  cycloSPORINE        Dose:  1 drop   Place 1 drop into both eyes 2 times daily   Refills:  0       tobramycin-dexamethasone 0.3-0.1 % ophthalmic susp   Commonly known as:  TOBRADEX        Dose:  1 drop   Place 1 drop into the right eye 4 times daily   Refills:  0       traMADol 50 MG tablet   Commonly known as:  ULTRAM   Used for:  Chronic bilateral low back pain without sciatica        Dose:   mg   Take 1-2 tablets ( mg) by mouth every 8 hours as needed for pain maximum 2 tablet(s) per day   Quantity:  30 tablet   Refills:  0         STOP taking     IBUPROFEN PO                Where to get your medicines      These medications were sent to [facility] [place], [place] - [address] [address]     Phone:  [phone]     furosemide 20 MG tablet    nitroGLYcerin 0.4 MG sublingual tablet                Protect others around you: Learn how to safely use, store and throw away your medicines at www.disposemymeds.org.             Medication List: This is a list of all your medications and when to take them. Check marks below indicate your daily home schedule. Keep this list as a reference.      Medications           Morning Afternoon Evening Bedtime As Needed    acetaminophen 650 MG 8 hour tablet   Take 650 mg by mouth every 8 hours as needed for mild pain or fever              "                      alendronate 70 MG tablet   Commonly known as:  FOSAMAX   TAKE 1 TABLET EVERY 7 DAYS AT LEAST 60 MIN BEFORE BREAKFAST AS DIRECTED \"SEE PACKAGE FOR ADDITIONAL INSTRUCTIONS\"                                   aspirin 81 MG tablet   ONE DAILY                                   CALCIUM 500 + D 500-200 MG-IU Tabs   1 tablets 3 times daily                                         CRANBERRY CONCENTRATE PO   Take 1 capsule by mouth 2 times daily                                      Fish Oil 500 MG Caps   Take 1 capsule by mouth 3 times daily                                         furosemide 20 MG tablet   Commonly known as:  LASIX   Take 1 tablet (20 mg) by mouth daily                                irbesartan 300 MG tablet   Commonly known as:  AVAPRO   TAKE 1 TABLET EVERY DAY   Last time this was given:  300 mg on 12/14/2017  7:40 AM                                   lovastatin 40 MG tablet   Commonly known as:  MEVACOR   TAKE 1 TABLET AT BEDTIME (HYPERLIPIDEMIA LDL GOAL  BELOW 130)                                   melatonin 3 MG tablet   Take 1 tablet (3 mg) by mouth nightly as needed for sleep                                   nitroGLYcerin 0.4 MG sublingual tablet   Commonly known as:  NITROSTAT   For chest pain place 1 tablet under the tongue every 5 minutes for 3 doses. If symptoms persist 5 minutes after 1st dose call 911.   Last time this was given:  0.4 mg on 12/13/2017 12:20 PM                                   ofloxacin 0.3 % ophthalmic solution   Commonly known as:  OCUFLOX   Place 1 drop into the right eye 4 times daily   Last time this was given:  1 drop on 12/14/2017 12:06 PM                                            omeprazole 20 MG CR capsule   Commonly known as:  priLOSEC   Take 1 capsule (20 mg) by mouth daily   Last time this was given:  20 mg on 12/14/2017  7:39 AM                                   polyethylene glycol Packet   Commonly known as:  MIRALAX/GLYCOLAX   Take 17 g by " mouth 2 times daily as needed for constipation                                   prednisoLONE acetate 1 % ophthalmic susp   Commonly known as:  PRED FORTE   Place 1 drop into the right eye 4 times daily Taper as directed   Last time this was given:  1 drop on 12/14/2017 12:06 PM                                            propranolol 60 MG 24 hr capsule   Commonly known as:  INDERAL LA   Take 60 mg by mouth daily                                   RESTASIS 0.05 % ophthalmic emulsion   Place 1 drop into both eyes 2 times daily   Generic drug:  cycloSPORINE                                      tobramycin-dexamethasone 0.3-0.1 % ophthalmic susp   Commonly known as:  TOBRADEX   Place 1 drop into the right eye 4 times daily   Last time this was given:  1 drop on 12/14/2017 12:06 PM                                            traMADol 50 MG tablet   Commonly known as:  ULTRAM   Take 1-2 tablets ( mg) by mouth every 8 hours as needed for pain maximum 2 tablet(s) per day

## 2017-12-13 NOTE — ED NOTES
Dimple is BIBA from home with CP and SOB that started at 0600. SPF found her to have sats in the upper 80's. The treatment brought her sats up and she was sustained on 10 liters for 94%. Dimple denies history of this kind of distress and denies cough, cold and fevers recently. She arrives with good color, good mentation and able to speak in full sentences.

## 2017-12-13 NOTE — PHARMACY-ADMISSION MEDICATION HISTORY
Admission medication history interview status for the 12/13/2017 admission is complete. See Epic admission navigator for allergy information, pharmacy, prior to admission medications and immunization status.     Medication history interview sources:  patient, eye drop bottles in room, daughter-in-law Day ([phone]), Zaid on [place]    Changes made to PTA medication list (reason)  Added: ofloxacin, tobradex, ibuprofen, propranolol ER, cranberry  Deleted: alendronate (duplicate)  Changed: prednisolone directions    Additional medication history information (including reliability of information, actions taken by pharmacist):  -patient able to identify medications and day that she last took them, but not time. Very clear about eye drops  -not taking restasis due to instructions surrounding cataract surgery  -unable to remember name of propranolol ER or ibuprofen, but able to obtain from [facility] and verify with Zaid  -verified flu shot 8/30/17  -verified no allergies    Prior to Admission medications    Medication Sig Last Dose Taking? Auth Provider   propranolol (INDERAL LA) 60 MG 24 hr capsule Take 60 mg by mouth daily 12/13/2017 at am Yes Unknown, Entered By History   IBUPROFEN PO Take 600 mg by mouth every 6 hours as needed for moderate pain Past Week at Unknown time Yes Unknown, Entered By History   ofloxacin (OCUFLOX) 0.3 % ophthalmic solution Place 1 drop into the right eye 4 times daily 12/13/2017 at am Yes Unknown, Entered By History   tobramycin-dexamethasone (TOBRADEX) 0.3-0.1 % ophthalmic susp Place 1 drop into the right eye 4 times daily 12/13/2017 at am Yes Unknown, Entered By History   CRANBERRY CONCENTRATE PO Take 1 capsule by mouth 2 times daily Past Week at Unknown time Yes Unknown, Entered By History   lovastatin (MEVACOR) 40 MG tablet TAKE 1 TABLET AT BEDTIME (HYPERLIPIDEMIA LDL GOAL  BELOW 130) 12/12/2017 at Unknown time Yes Pop Zapien MD   omeprazole (PRILOSEC) 20 MG CR  "capsule Take 1 capsule (20 mg) by mouth daily 12/13/2017 at am Yes Pop Zapien MD   alendronate (FOSAMAX) 70 MG tablet TAKE 1 TABLET EVERY 7 DAYS AT LEAST 60 MIN BEFORE BREAKFAST AS DIRECTED \"SEE PACKAGE FOR ADDITIONAL INSTRUCTIONS\" 12/11/2017 at am Yes Pop Zapien MD   irbesartan (AVAPRO) 300 MG tablet TAKE 1 TABLET EVERY DAY 12/12/2017 at Unknown time Yes Pop Zapien MD   acetaminophen 650 MG TABS Take 650 mg by mouth every 8 hours as needed for mild pain or fever Past Week at Unknown time Yes Torsten Stallworth APRN CNP   polyethylene glycol (MIRALAX/GLYCOLAX) packet Take 17 g by mouth 2 times daily as needed for constipation Past Month at Unknown time Yes Torsten Stallworth APRN CNP   Omega-3 Fatty Acids (FISH OIL) 500 MG CAPS Take 1 capsule by mouth 3 times daily 12/12/2017 at Unknown time Yes Unknown, Entered By History   prednisoLONE acetate (PRED FORTE) 1 % ophthalmic suspension Place 1 drop into the right eye 4 times daily Taper as directed 12/13/2017 at am Yes Reported, Patient   melatonin 3 MG tablet Take 1 tablet (3 mg) by mouth nightly as needed for sleep 12/12/2017 at pm Yes Bel Multani MD   ASPIRIN 81 MG OR TABS ONE DAILY  Patient taking differently: ONE DAILY at bedtime 12/12/2017 at pm Yes Klaudia Mathis MD   CALCIUM 500 + D 500-200 MG-IU OR TABS 1 tablets 3 times daily 12/12/2017 at Unknown time Yes Klaudia Mathis MD   traMADol (ULTRAM) 50 MG tablet Take 1-2 tablets ( mg) by mouth every 8 hours as needed for pain maximum 2 tablet(s) per day  Patient not taking: Reported on 12/13/2017 Not Taking at Unknown time  Pop Zapien MD   cycloSPORINE (RESTASIS) 0.05 % ophthalmic emulsion Place 1 drop into both eyes 2 times daily   Reported, Patient           Medication history completed by: Kayli Barron, PharmD      "

## 2017-12-13 NOTE — LETTER
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]  ACS (acute coronary syndrome) (H) [I24.9]  Elevated troponin [R74.8]  Chest tightness [R07.89]  Admit Date/Time: 12/13/2017  7:56 AM  Discharge Date: 12/14/2017  Payor Source: Payor: MEDICARE / Plan: MEDICARE / Product Type: Medicare /     Reason for Communication Hand-off Referral: Multiple providers/specialties    Discharge Plan: Discharge to home with [facility] follow up, 12/22       Concern for non-adherence with plan of care:                     No  Follow-up specialty is recommended: Yes    Follow-up plan:  Future Appointments  Date Time Provider Department Center   12/18/2017 1:00 PM [id number] [facility] O   12/18/2017 1:30 PM [id number] [facility] O   12/19/2017 1:30 PM UUNM2 [facility] O   12/22/2017 9:00 AM UC LAB UCLAB [facility]   12/22/2017 9:30 AM Brianna Larkin NP [facility]     Key Recommendations:  Post hospitalization follow up.  Daxa Conway RN BSN  6C Unit Care Coordinator  Phone number: [phone]  Pager: [phone]

## 2017-12-13 NOTE — ED NOTES
Call placed to patient's son per her request; [phone]. Geoff tells me that she is stressed out over her grandson's coming to stay with her this weekend. Will call Geoff with an update on her disposition later.

## 2017-12-13 NOTE — ED PROVIDER NOTES
History     Chief Complaint   Patient presents with     Chest Pain     HPI  Dimple Oviedo is a 84 year old female with a history of hyperlipidemia, nontoxic multinodular goiter, obesity, kidney stones, esophageal reflux and recently diagnosed hyperthyroidism per her report who presents via ambulance code read secondary to respiratory distress.  She states that she was in her usual state of health until approximately 2 hours prior to presentation when she noted mild shortness of breath.  Shortness of breath increased when she went to the bathroom to the point that she contacted 911.  She was noted by first responders to have an oxygen saturation in the 80% range.  She was also noted to be quite hypertensive at that time with a blood pressure of 200/120.  Paramedics treated her with aspirin, 2 nitroglycerin sprays, high flow oxygen and an albuterol nebulizer.  They had contacted me to request clearance for use of BiPAP in the setting of recent eye procedure.  Patient also notes that she was started on a beta-blocker 5 days ago secondary to hyperthyroidism.  Upon presentation, patient states that these interventions helped in her chest tightness has almost completely resolved.    I have reviewed the Medications, Allergies, Past Medical and Surgical History, and Social History in the Epic system.    Review of Systems   Constitutional: Negative for fever.   HENT: Negative for congestion.    Eyes: Negative for redness.   Respiratory: Positive for chest tightness and shortness of breath.    Cardiovascular: Negative for chest pain.   Gastrointestinal: Negative for abdominal pain.   Genitourinary: Negative for difficulty urinating.   Musculoskeletal: Negative for arthralgias and neck stiffness.   Skin: Negative for color change.   Neurological: Negative for headaches.   Psychiatric/Behavioral: Negative for confusion.       Physical Exam   BP: 156/77  Pulse: 90  Heart Rate: 87  Temp: 96.8  F (36  C)  Resp: 26  Height:  "147.3 cm (4' 10\")  Weight: 79.9 kg (176 lb 2.4 oz)  SpO2: 100 %      Physical Exam   Constitutional: No distress.   HENT:   Head: Atraumatic.   Mouth/Throat: Oropharynx is clear and moist. No oropharyngeal exudate.   Eyes: Right conjunctiva is injected. No scleral icterus. Right pupil is not reactive. Pupils are unequal.   Cardiovascular: Normal heart sounds and intact distal pulses.    Pulmonary/Chest: No respiratory distress. She has no decreased breath sounds. She has wheezes. She has rhonchi. She has rales.   Abdominal: Soft. Bowel sounds are normal. There is no tenderness.   Musculoskeletal: She exhibits no edema or tenderness.   Skin: Skin is warm. No rash noted. She is not diaphoretic.       ED Course     ED Course     Procedures             EKG Interpretation:      Interpreted by Kade Ty  Time reviewed: 7:59 AM  Symptoms at time of EKG: Dyspnea  Rhythm: normal sinus   Rate: Normal  Axis: Left Axis Deviation  Ectopy: premature ventricular contractions (unifocal)  Conduction: normal  ST Segments/ T Waves: No ST-T wave changes  Q Waves: v1  Comparison to prior: Unchanged from October 29, 2015 with the exception of frequent PVCs seen on EKG today    Clinical Impression: no acute changes      Medications   heparin  drip 25,000 units in 0.45% NaCl 250 mL (see additional administration details for dose) (950 Units/hr Intravenous New Bag 12/13/17 1054)   heparin bolus from infusion pump (not administered)   sodium chloride (PF) 0.9% PF flush 20 mL (not administered)   nitroGLYcerin (NITROSTAT) sublingual tablet 0.4 mg (0.4 mg Sublingual Given 12/13/17 1220)   nitroGLYcerin 50 mg in D5W 250 mL (adult std) infusion (not administered)   furosemide (LASIX) injection 40 mg (40 mg Intravenous Given 12/13/17 1033)   heparin Loading Dose from infusion pump *Give when STARTING heparin infusion 4,800 Units (4,800 Units Intravenous Given 12/13/17 1053)   perflutren diluted 1mL to 2mL with saline (OPTISON) diluted " injection 6 mL (6 mLs Intravenous Given 12/13/17 0957)   iopamidol (ISOVUE-370) solution 62 mL (62 mLs Intravenous Given 12/13/17 1126)   sodium chloride 0.9 % bag 500mL for CT scan flush use (91 mLs Intravenous Given 12/13/17 1127)   furosemide (LASIX) injection 40 mg (40 mg Intravenous Given 12/13/17 1337)     Results for orders placed or performed during the hospital encounter of 12/13/17 (from the past 24 hour(s))   EKG 12 lead   Result Value Ref Range    Interpretation ECG Click View Image link to view waveform and result    Troponin POCT   Result Value Ref Range    Troponin I 0.06 0.00 - 0.10 ug/L   ISTAT gases lactate louie POCT   Result Value Ref Range    Ph Venous 7.38 7.32 - 7.43 pH    PCO2 Venous 43 40 - 50 mm Hg    PO2 Venous 40 25 - 47 mm Hg    Bicarbonate Venous 26 21 - 28 mmol/L    O2 Sat Venous 74 %    Lactic Acid 1.0 0.7 - 2.1 mmol/L   CBC with platelets differential   Result Value Ref Range    WBC 7.4 4.0 - 11.0 10e9/L    RBC Count 4.09 3.8 - 5.2 10e12/L    Hemoglobin 11.9 11.7 - 15.7 g/dL    Hematocrit 38.0 35.0 - 47.0 %    MCV 93 78 - 100 fl    MCH 29.1 26.5 - 33.0 pg    MCHC 31.3 (L) 31.5 - 36.5 g/dL    RDW 15.5 (H) 10.0 - 15.0 %    Platelet Count 295 150 - 450 10e9/L    Diff Method Automated Method     % Neutrophils 52.6 %    % Lymphocytes 33.2 %    % Monocytes 12.4 %    % Eosinophils 1.2 %    % Basophils 0.5 %    % Immature Granulocytes 0.1 %    Nucleated RBCs 0 0 /100    Absolute Neutrophil 3.9 1.6 - 8.3 10e9/L    Absolute Lymphocytes 2.4 0.8 - 5.3 10e9/L    Absolute Monocytes 0.9 0.0 - 1.3 10e9/L    Absolute Eosinophils 0.1 0.0 - 0.7 10e9/L    Absolute Basophils 0.0 0.0 - 0.2 10e9/L    Abs Immature Granulocytes 0.0 0 - 0.4 10e9/L    Absolute Nucleated RBC 0.0    Troponin I   Result Value Ref Range    Troponin I ES 0.113 (H) 0.000 - 0.045 ug/L   Comprehensive metabolic panel   Result Value Ref Range    Sodium 142 133 - 144 mmol/L    Potassium 3.8 3.4 - 5.3 mmol/L    Chloride 110 (H) 94 - 109  mmol/L    Carbon Dioxide 24 20 - 32 mmol/L    Anion Gap 7 3 - 14 mmol/L    Glucose 119 (H) 70 - 99 mg/dL    Urea Nitrogen 23 7 - 30 mg/dL    Creatinine 0.65 0.52 - 1.04 mg/dL    GFR Estimate 87 >60 mL/min/1.7m2    GFR Estimate If Black >90 >60 mL/min/1.7m2    Calcium 8.7 8.5 - 10.1 mg/dL    Bilirubin Total 0.5 0.2 - 1.3 mg/dL    Albumin 3.0 (L) 3.4 - 5.0 g/dL    Protein Total 6.8 6.8 - 8.8 g/dL    Alkaline Phosphatase 74 40 - 150 U/L    ALT 85 (H) 0 - 50 U/L    AST 76 (H) 0 - 45 U/L   Nt probnp inpatient (BNP)   Result Value Ref Range    N-Terminal Pro BNP Inpatient 1199 0 - 1800 pg/mL   D dimer quantitative   Result Value Ref Range    D Dimer 1.3 (H) 0.0 - 0.50 ug/ml FEU   XR Chest Port 1 View    Narrative     Examination:  XR CHEST PORT 1 VW     Date:  2017 8:18 AM      Clinical Information: Chest pain;      Additional Information: none    Comparison: 10/29/2015    Findings:   The pulmonary vasculature is indistinct. The cardiac silhouette is  upper limits of normal. Interstitial prominence. No focal opacities in  the lungs. Costophrenic angles are sharp. No acute osseous injury.      Impression    Impression:  1. Interstitial prominence. Differential pulmonary vascular congestion  versus infection.    ANNA DOWNEY MD   ECHO COMPLETE WITH OPTISON    Narrative    [id number]  [id number]  [id number]  [id number]^RETA^RICK^E           [facility],[place]  Echocardiography Laboratory  [address] [place] [id number]     Name: QUIN VALDEZ  MRN: [id number]  : 1933  Study Date: 2017 09:39 AM  Age: 84 yrs  Gender: Female  Patient Location: [facility]  Reason For Study: Dyspnea  Ordering Physician: RICK MCDUFFIE  Performed By: [facility] Destinee See     BSA: 1.7 m2  Height: 58 in  Weight: 176 lb  BP: 131/85 mmHg  _____________________________________________________________________________  __        Procedure  Echocardiogram with two-dimensional, color and spectral Doppler  performed.  Contrast Optison. Optison (NDC #0084-0005-97) given intravenously. Patient was  given 6.0 ml mixture of 3 ml Optison and 6 ml saline. 3.0 ml wasted.  _____________________________________________________________________________  __        Interpretation Summary  Technically difficult study due to poor acoustic windows.     Normal left ventricular size with mild concentric hypertrophy.  Global akinesis with sparing of the basal segments consistent with stress  cardiomyopathy versus multivessel coronary artery disease. Moderately reduced  systolic function. EF 30-35%.  Normal right ventricular size and function.  Severe left atrial enlargement.  No pericardial effusion.     Compared to the prior study 10/31/15 the wall motion abnormalities and LV  dysfunction are new.  _____________________________________________________________________________  __        Left Ventricle  Left ventricular size is normal. Mild concentric wall thickening consistent  with left ventricular hypertrophy is present. Moderately (EF 30-35%) reduced  left ventricular function is present. Global akinesis with sparing of the  basal segments consistent with stress cardiomyopathy versus multivessel  coronary artery disease.     Right Ventricle  The right ventricle is normal size. Global right ventricular function is  normal.     Atria  The right atria appears normal. Severe left atrial enlargement is present.        Mitral Valve  The mitral valve is normal. Trace mitral insufficiency is present.     Aortic Valve  Moderate aortic valve calcification. Mild aortic insufficiency is present.  Mild aortic stenosis is present.     Tricuspid Valve  The tricuspid valve is normal. Trace tricuspid insufficiency is present. The  peak velocity of the tricuspid regurgitant jet is not obtainable.     Pulmonic Valve  The pulmonic valve is normal. Trace pulmonic insufficiency is present.     Vessels  The aorta root is normal. The inferior vena cava was  normal in size with  preserved respiratory variability. Estimated mean right atrial pressure is 3  mmHg.     Pericardium  No pericardial effusion is present.        Attestation  I have personally viewed the imaging and agree with the interpretation and  report as documented by the fellow, Chencho Chanel, and/or edited by me.  _____________________________________________________________________________  __     MMode/2D Measurements & Calculations  IVSd: 1.3 cm  LVIDd: 4.7 cm  LVIDs: 3.4 cm  LVPWd: 1.2 cm  FS: 27.4 %  EDV(Teich): 101.3 ml  ESV(Teich): 47.4 ml  LV mass(C)d: 219.4 grams  LV mass(C)dI: 127.2 grams/m2  Ao root diam: 2.5 cm  LVOT diam: 2.2 cm  LVOT area: 3.8 cm2     EF(MOD-bp): 31.6 %  LA Volume (BP): 88.7 ml  LA Volume Index (BP): 51.6 ml/m2     RWT: 0.51        Doppler Measurements & Calculations  MV E max tati: 127.8 cm/sec  Ao V2 max: 212.3 cm/sec  Ao max P.0 mmHg  Ao V2 mean: 147.3 cm/sec  Ao mean PG: 10.0 mmHg  Ao V2 VTI: 40.1 cm  PAUL(I,D): 1.7 cm2  PAUL(V,D): 1.7 cm2  LV V1 max PG: 3.6 mmHg  LV V1 max: 95.3 cm/sec  LV V1 VTI: 18.2 cm  SV(LVOT): 69.2 ml  SI(LVOT): 40.1 ml/m2  PAUL Index (cm2/m2): 1.0  E/E' av.7  Lateral E/e': 25.6  Medial E/e': 23.8        _____________________________________________________________________________  __        Report approved by: Ade Razo 2017 10:42 AM      CBC with platelets   Result Value Ref Range    WBC 8.3 4.0 - 11.0 10e9/L    RBC Count 4.00 3.8 - 5.2 10e12/L    Hemoglobin 11.6 (L) 11.7 - 15.7 g/dL    Hematocrit 37.2 35.0 - 47.0 %    MCV 93 78 - 100 fl    MCH 29.0 26.5 - 33.0 pg    MCHC 31.2 (L) 31.5 - 36.5 g/dL    RDW 15.5 (H) 10.0 - 15.0 %    Platelet Count 277 150 - 450 10e9/L   Chest CT, IV contrast only - PE protocol    Narrative    EXAMINATION: CT CHEST PULMONARY EMBOLISM W CONTRAST, 2017 11:51  AM     COMPARISON: Chest radiographs from the same day, CT 2015    HISTORY: Acute shortness of breath.    TECHNIQUE: Volumetric  helical acquisition of CT images of the chest  from the lung apices to the kidneys were acquired after the  administration of Contrast: iopamidol (ISOVUE-370) solution 62 mL .  Three-dimensional (3D) post-processed angiographic images were  reconstructed, archived to PACS and used in interpretation of this  study.    FINDINGS:   There is no pulmonary embolism. Normal rightward bowing of the  interventricular septum. Pulmonary artery is not dilated     The heart is not enlarged, although the right atrium is prominent. The  thoracic aorta is mildly atherosclerotic without without aneurysm.  There are mildly enlarged mediastinal lymph nodes, for example right  lower paratracheal lymph nodes measuring up to at least 1.1 cm. There  is no pericardial effusion. Thyroid gland is multinodular. No axillary  lymphadenopathy.    Basilar predominant intralobular septal thickening and mild  peribronchial vascular groundglass and consolidative opacities.  Basilar predominant bronchial wall thickening. Small to moderate left  and small right pleural effusions with overlying compressive  atelectasis. No mucous plugging.    Upper abdomen:  Cholecystectomy. Atherosclerotic calcifications. Small hiatal hernia.  Visualized portions of the extrahepatic common bile duct are dilated,  measuring up to at least 1.1 cm on the sagittal images. No  intrahepatic biliary ductal dilation. 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    Bones and soft tissues:  No acute fracture or suspicious bone lesion. Moderate degenerative  changes in spine.      Impression    IMPRESSION:   1. No pulmonary embolism identified.  2. Basilar predominant peribronchovascular ground glass and  consolidative opacities with interlobular septal thickening and small  bilateral pleural effusions. The appearance is most compatible with  pulmonary edema. Infectious process  is not excluded.  3. Mildly enlarged mediastinal lymph nodes is nonspecific. This can be  seen as a result of vascular congestion, but is nonspecific.  4. Multinodular thyroid gland.  5. Dilated extrahepatic common bile duct up to 1.1 cm, without  identified intrahepatic biliary ductal dilation. This may be a  consequence of the reservoir effect following cholecystectomy, but is  nonspecific, and the common bile duct is not entirely included in the  field-of-view.  6. 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    I have personally reviewed the examination and initial interpretation  and I agree with the findings.    REBA REED MD   EKG 12-lead, tracing only   Result Value Ref Range    Interpretation ECG Click View Image link to view waveform and result    Troponin I   Result Value Ref Range    Troponin I ES 1.332 (HH) 0.000 - 0.045 ug/L       12:05 PM is called into the room by nurse with patient complaining of left sided nonradiating dull chest pain.  She again notes that she has not had pain in the past.  Exam revealed crackles in the bases.  Repeat EKG was unchanged.  Patient was provided with nitroglycerin with resolution of symptoms.  Will start IV nitroglycerin.  Repeat troponin 4 hours after initial is pending.                 EKG Interpretation:      Interpreted by Kade Ty  Time reviewed: 12:09 PM  Symptoms at time of EKG: Chest pain  Rhythm: normal sinus   Rate: Normal  Axis: Left Axis Deviation  Ectopy: premature ventricular contractions (unifocal)  Conduction: normal  ST Segments/ T Waves: No ST-T wave changes  Q Waves: none  Comparison to prior: Unchanged from earlier today at 7:57 AM    Clinical Impression: no acute changes                Critical Care time:   95 minutes       Labs Ordered and Resulted from Time of ED Arrival Up to the Time of Departure from the ED   CBC WITH PLATELETS DIFFERENTIAL - Abnormal;  Notable for the following:        Result Value    MCHC 31.3 (*)     RDW 15.5 (*)     All other components within normal limits   TROPONIN I - Abnormal; Notable for the following:     Troponin I ES 0.113 (*)     All other components within normal limits   COMPREHENSIVE METABOLIC PANEL - Abnormal; Notable for the following:     Chloride 110 (*)     Glucose 119 (*)     Albumin 3.0 (*)     ALT 85 (*)     AST 76 (*)     All other components within normal limits   CBC WITH PLATELETS - Abnormal; Notable for the following:     Hemoglobin 11.6 (*)     MCHC 31.2 (*)     RDW 15.5 (*)     All other components within normal limits   D DIMER QUANTITATIVE - Abnormal; Notable for the following:     D Dimer 1.3 (*)     All other components within normal limits   TROPONIN I - Abnormal; Notable for the following:     Troponin I ES 1.332 (*)     All other components within normal limits   NT PROBNP INPATIENT   CARDIAC CONTINUOUS MONITORING   PULSE OXIMETRY NURSING   CARDIAC CONTINUOUS MONITORING   PERIPHERAL IV CATHETER   ISTAT CG4 GASES LACTATE [facility] NURSING POCT   ISTAT TROPONIN NURSING POCT   PLATELETS MONITORED PER HEPARIN TREATMENT PROTOCOL (FOR MEANINGFUL USE   STRICT INTAKE AND OUTPUT   ISTAT  GASES LACTATE [facility] POCT   TROPONIN POCT       Consults  Cardiology: Responded (CSI) (12/13/17 0928)  12:55 PM the case is discussed again with cardiology regarding elevated troponin, unchanged EKG in the setting of 5 minutes of left sided chest pain which resolved with nitroglycerin.  Assessments & Plan (with Medical Decision Making)   84-year-old female with a history of hypertension and hyperlipidemia who presents with acute progressive shortness of breath with mild chest tightness.  Differential included acute coronary syndrome, pulmonary embolus, pneumothorax.  Exam revealed expiratory wheezing and rales in the setting of elevated blood pressure.  Laboratories were obtained including CBC and comprehensive metabolic panel which were normal  with exception of slight elevations in glucose, ALT and AST.  N-terminal BNP was 1199.  Chest x-ray revealed mild pulmonary edema.  D-dimer was elevated which prompted chest CT which was negative.  Echocardiogram was obtained and revealed decreased ejection fraction with global akinesis with areas of sparing.  Initial troponin was elevated at 0.113.  Patient's symptoms had resolved with nitroglycerin, aspirin and nebulizer treatment and the case was discussed with cardiology who agreed to admit the patient.  Patient was started on IV heparin.    Approximately 4 hours into ER course, chest pain occurred and repeat EKG was unchanged.  Troponin drawn at that time was 1.33.  The patient was treated with one nitroglycerin with resolution of her discomfort.  The case again was discussed with cardiology with concerns of ongoing subendocardial myocardial infarction.  Order for IV nitroglycerin was placed.  I have reviewed the nursing notes.    I have reviewed the findings, diagnosis, plan and need for follow up with the patient.    New Prescriptions    No medications on file       Final diagnoses:   Elevated troponin   ACS (acute coronary syndrome) (H)   Subendocardial infarction (H)       12/13/2017   [facility], [place], EMERGENCY DEPARTMENT     Kade Ty MD  12/13/17 5040       Kade Ty MD  12/13/17 0554

## 2017-12-13 NOTE — IP AVS SNAPSHOT
[address] [address]    Phone:  [phone]                                       After Visit Summary   12/13/2017    Dimple Oviedo    MRN: [id number]           After Visit Summary Signature Page     I have received my discharge instructions, and my questions have been answered. I have discussed any challenges I see with this plan with the nurse or doctor.    ..........................................................................................................................................  Patient/Patient Representative Signature      ..........................................................................................................................................  Patient Representative Print Name and Relationship to Patient    ..................................................               ................................................  Date                                            Time    ..........................................................................................................................................  Reviewed by Signature/Title    ...................................................              ..............................................  Date                                                            Time

## 2017-12-13 NOTE — ED NOTES
"ED to Floor Handoff      S:  Dimple Oviedo is a 84 year old female who speaks English and lives alone,  in a home  They arrived in the ED by ambulance from home with a complaint of Chest Pain    Initial vitals were:   BP: 156/77  Pulse: 90  Heart Rate: 87  Temp: 96.8  F (36  C)  Resp: 26  Height: 147.3 cm (4' 10\")  Weight: 79.9 kg (176 lb 2.4 oz)  SpO2: 100 %  Allergies:   Allergies   Allergen Reactions     No Known Drug Allergies    .  The meds given in the ED and their home medications are:   Current Facility-Administered Medications   Medication     heparin  drip 25,000 units in 0.45% NaCl 250 mL (see additional administration details for dose)     heparin bolus from infusion pump     sodium chloride (PF) 0.9% PF flush 20 mL     nitroGLYcerin (NITROSTAT) sublingual tablet 0.4 mg     nitroGLYcerin 50 mg in D5W 250 mL (adult std) infusion     Current Outpatient Prescriptions   Medication     propranolol (INDERAL LA) 60 MG 24 hr capsule     IBUPROFEN PO     ofloxacin (OCUFLOX) 0.3 % ophthalmic solution     tobramycin-dexamethasone (TOBRADEX) 0.3-0.1 % ophthalmic susp     CRANBERRY CONCENTRATE PO     lovastatin (MEVACOR) 40 MG tablet     omeprazole (PRILOSEC) 20 MG CR capsule     alendronate (FOSAMAX) 70 MG tablet     irbesartan (AVAPRO) 300 MG tablet     acetaminophen 650 MG TABS     polyethylene glycol (MIRALAX/GLYCOLAX) packet     Omega-3 Fatty Acids (FISH OIL) 500 MG CAPS     prednisoLONE acetate (PRED FORTE) 1 % ophthalmic suspension     melatonin 3 MG tablet     ASPIRIN 81 MG OR TABS     CALCIUM 500 + D 500-200 MG-IU OR TABS     traMADol (ULTRAM) 50 MG tablet     [DISCONTINUED] alendronate (FOSAMAX) 70 MG tablet     cycloSPORINE (RESTASIS) 0.05 % ophthalmic emulsion     Social demographics are   Social History     Social History     Marital status:      Spouse name:      Number of children: 2     Years of education: N/A     Occupational History      Retired     Social History Main Topics     " Smoking status: Never Smoker     Smokeless tobacco: Never Used     Alcohol use No      Comment: 6 times per year-one drink     Drug use: No     Sexual activity: Yes     Partners: Male     Other Topics Concern      Service No     Blood Transfusions No     Exercise Yes     curves     Seat Belt Yes     Self-Exams Yes     Parent/Sibling W/ Cabg, Mi Or Angioplasty Before 65f 55m? No     Social History Narrative    December 7, 2009    Balanced Diet - Yes    Osteoporosis Prevention Measures - Dairy servings per day: 2 and Medication/Supplements (See current meds)    Regular Exercise -  Yes Describe curves, walking    Dental Exam - YES - Date: 10/2009    Eye Exam - YES - Date: 2008    Self Breast Exam - Yes    Abuse: Current or Past (Physical, Sexual or Emotional)- No    Do you feel safe in your environment - Yes    Guns stored in the home - No    Sunscreen used - Yes    Seatbelts used - Yes    Lipids -  YES - Date: 11/24/2009    Glucose -  YES - Date: 11/24/2009    Colon Cancer Screening - Colonoscopy 11/18/2005(date completed)    Hemoccults - YES - Date: 10/12/2004    Pap Test -  YES - Date: 09/22/2004    Do you have any concerns about STD's -  No    Mammography - YES - Date: 11/24/2009    DEXA - YES - Date: 11/20/2008    Immunizations reviewed and up to date - Yes    PAULETTE Spencer CMA                   B:   The patient has been ill for 1 day(s) and during this time the symptoms have decreased.  In the ED was diagnosed with   Final diagnoses:   Chest tightness   Elevated troponin    Infection/sepsis suspected:No Isolation type; No active isolations   A:   In the ED these meds were given:   Medications   heparin  drip 25,000 units in 0.45% NaCl 250 mL (see additional administration details for dose) (950 Units/hr Intravenous New Bag 12/13/17 1054)   heparin bolus from infusion pump (not administered)   sodium chloride (PF) 0.9% PF flush 20 mL (not administered)   nitroGLYcerin (NITROSTAT) sublingual tablet 0.4 mg (0.4  mg Sublingual Given 12/13/17 1220)   nitroGLYcerin 50 mg in D5W 250 mL (adult std) infusion (not administered)   furosemide (LASIX) injection 40 mg (40 mg Intravenous Given 12/13/17 1033)   heparin Loading Dose from infusion pump *Give when STARTING heparin infusion 4,800 Units (4,800 Units Intravenous Given 12/13/17 1053)   perflutren diluted 1mL to 2mL with saline (OPTISON) diluted injection 6 mL (6 mLs Intravenous Given 12/13/17 0957)   iopamidol (ISOVUE-370) solution 62 mL (62 mLs Intravenous Given 12/13/17 1126)   sodium chloride 0.9 % bag 500mL for CT scan flush use (91 mLs Intravenous Given 12/13/17 1127)     Drips running?  Yes  Labs results   Labs Ordered and Resulted from Time of ED Arrival Up to the Time of Departure from the ED   CBC WITH PLATELETS DIFFERENTIAL - Abnormal; Notable for the following:        Result Value    MCHC 31.3 (*)     RDW 15.5 (*)     All other components within normal limits   TROPONIN I - Abnormal; Notable for the following:     Troponin I ES 0.113 (*)     All other components within normal limits   COMPREHENSIVE METABOLIC PANEL - Abnormal; Notable for the following:     Chloride 110 (*)     Glucose 119 (*)     Albumin 3.0 (*)     ALT 85 (*)     AST 76 (*)     All other components within normal limits   CBC WITH PLATELETS - Abnormal; Notable for the following:     Hemoglobin 11.6 (*)     MCHC 31.2 (*)     RDW 15.5 (*)     All other components within normal limits   D DIMER QUANTITATIVE - Abnormal; Notable for the following:     D Dimer 1.3 (*)     All other components within normal limits   NT PROBNP INPATIENT   TROPONIN I   CARDIAC CONTINUOUS MONITORING   PULSE OXIMETRY NURSING   CARDIAC CONTINUOUS MONITORING   PERIPHERAL IV CATHETER   ISTAT CG4 GASES LACTATE LAWRENCE NURSING POCT   ISTAT TROPONIN NURSING POCT   PLATELETS MONITORED PER HEPARIN TREATMENT PROTOCOL (FOR MEANINGFUL USE   STRICT INTAKE AND OUTPUT   ISTAT  GASES LACTATE LAWRENCE POCT   TROPONIN POCT     Imaging Studies:   Recent  "Results (from the past 24 hour(s))   XR Chest Port 1 View    Narrative     Examination:  XR CHEST PORT 1 VW     Date:  12/13/2017 8:18 AM      Clinical Information: Chest pain;      Additional Information: none    Comparison: 10/29/2015    Findings:   The pulmonary vasculature is indistinct. The cardiac silhouette is  upper limits of normal. Interstitial prominence. No focal opacities in  the lungs. Costophrenic angles are sharp. No acute osseous injury.      Impression    Impression:  1. Interstitial prominence. Differential pulmonary vascular congestion  versus infection.    ANNA DOWNEY MD   Chest CT, IV contrast only - PE protocol    Narrative    Radiology resident preliminary read:    No pulmonary embolism.    Bilateral pleural effusions, intralobular septal thickening  peribronchovascular consolidation with bibasilar bronchial thickening,  suspicious for pulmonary edema. Infectious component not excluded.  Hepatic venous reflux of contrast is also supportive of right heart  dysfunction.    Multinodular thyroid gland.    Full report to follow.       Recent vital signs /64  Pulse 82  Temp 96.8  F (36  C) (Axillary)  Resp 13  Ht 1.473 m (4' 10\")  Wt 79.9 kg (176 lb 2.4 oz)  SpO2 100%  Breastfeeding? No  BMI 36.81 kg/m2  Cardiac Rhythm: ,   Cardiac  Cardiac Rhythm: Other (Comment);Normal sinus rhythm (PVCs)  Abnormal labs/tests/findings requiring intervention:---  Pain control: good  Nausea control: pt had none  R:   Transfer assistance needed: Stand with Assist  Family present during ED course? No   Family currently present? No  Pt needs tele? Yes  Code Status: DNR  Tasks needing to be completed:---    S[address]-- [phone] [facility]  [phone] [facility]        "

## 2017-12-14 VITALS
BODY MASS INDEX: 33.42 KG/M2 | HEIGHT: 58 IN | WEIGHT: 159.2 LBS | RESPIRATION RATE: 16 BRPM | DIASTOLIC BLOOD PRESSURE: 53 MMHG | TEMPERATURE: 98.3 F | OXYGEN SATURATION: 94 % | SYSTOLIC BLOOD PRESSURE: 128 MMHG | HEART RATE: 82 BPM

## 2017-12-14 PROBLEM — I51.81 STRESS-INDUCED CARDIOMYOPATHY: Status: ACTIVE | Noted: 2017-12-14

## 2017-12-14 LAB
CHOLEST SERPL-MCNC: 126 MG/DL
ERYTHROCYTE [DISTWIDTH] IN BLOOD BY AUTOMATED COUNT: 15.5 % (ref 10–15)
ERYTHROCYTE [DISTWIDTH] IN BLOOD BY AUTOMATED COUNT: NORMAL % (ref 10–15)
GLUCOSE BLD-MCNC: 186 MG/DL (ref 70–99)
GLUCOSE BLDC GLUCOMTR-MCNC: 168 MG/DL (ref 70–99)
HBA1C MFR BLD: 6.2 % (ref 4.3–6)
HBA1C MFR BLD: NORMAL % (ref 4.3–6)
HCT VFR BLD AUTO: 40.7 % (ref 35–47)
HCT VFR BLD AUTO: NORMAL % (ref 35–47)
HDLC SERPL-MCNC: 44 MG/DL
HGB BLD-MCNC: 12.9 G/DL (ref 11.7–15.7)
HGB BLD-MCNC: NORMAL G/DL (ref 11.7–15.7)
INTERPRETATION ECG - MUSE: NORMAL
LDLC SERPL CALC-MCNC: 56 MG/DL
MCH RBC QN AUTO: 29.5 PG (ref 26.5–33)
MCH RBC QN AUTO: NORMAL PG (ref 26.5–33)
MCHC RBC AUTO-ENTMCNC: 31.7 G/DL (ref 31.5–36.5)
MCHC RBC AUTO-ENTMCNC: NORMAL G/DL (ref 31.5–36.5)
MCV RBC AUTO: 93 FL (ref 78–100)
MCV RBC AUTO: NORMAL FL (ref 78–100)
NONHDLC SERPL-MCNC: 82 MG/DL
PLATELET # BLD AUTO: 274 10E9/L (ref 150–450)
PLATELET # BLD AUTO: NORMAL 10E9/L (ref 150–450)
POTASSIUM SERPL-SCNC: 3.9 MMOL/L (ref 3.4–5.3)
RBC # BLD AUTO: 4.37 10E12/L (ref 3.8–5.2)
RBC # BLD AUTO: NORMAL 10E12/L (ref 3.8–5.2)
TRIGL SERPL-MCNC: 134 MG/DL
TROPONIN I SERPL-MCNC: 1.51 UG/L (ref 0–0.04)
WBC # BLD AUTO: 8.5 10E9/L (ref 4–11)
WBC # BLD AUTO: NORMAL 10E9/L (ref 4–11)

## 2017-12-14 PROCEDURE — 00000146 ZZHCL STATISTIC GLUCOSE BY METER IP

## 2017-12-14 PROCEDURE — 93010 ELECTROCARDIOGRAM REPORT: CPT | Performed by: INTERNAL MEDICINE

## 2017-12-14 PROCEDURE — 80061 LIPID PANEL: CPT | Performed by: NURSE PRACTITIONER

## 2017-12-14 PROCEDURE — 25000132 ZZH RX MED GY IP 250 OP 250 PS 637: Mod: GY | Performed by: NURSE PRACTITIONER

## 2017-12-14 PROCEDURE — 83036 HEMOGLOBIN GLYCOSYLATED A1C: CPT | Performed by: INTERNAL MEDICINE

## 2017-12-14 PROCEDURE — 82947 ASSAY GLUCOSE BLOOD QUANT: CPT | Performed by: INTERNAL MEDICINE

## 2017-12-14 PROCEDURE — A9270 NON-COVERED ITEM OR SERVICE: HCPCS | Mod: GY | Performed by: INTERNAL MEDICINE

## 2017-12-14 PROCEDURE — 36415 COLL VENOUS BLD VENIPUNCTURE: CPT | Performed by: NURSE PRACTITIONER

## 2017-12-14 PROCEDURE — 25000132 ZZH RX MED GY IP 250 OP 250 PS 637: Mod: GY | Performed by: INTERNAL MEDICINE

## 2017-12-14 PROCEDURE — A9270 NON-COVERED ITEM OR SERVICE: HCPCS | Mod: GY | Performed by: NURSE PRACTITIONER

## 2017-12-14 PROCEDURE — 84132 ASSAY OF SERUM POTASSIUM: CPT | Performed by: NURSE PRACTITIONER

## 2017-12-14 PROCEDURE — 85027 COMPLETE CBC AUTOMATED: CPT | Performed by: NURSE PRACTITIONER

## 2017-12-14 PROCEDURE — 84484 ASSAY OF TROPONIN QUANT: CPT | Performed by: NURSE PRACTITIONER

## 2017-12-14 PROCEDURE — 36415 COLL VENOUS BLD VENIPUNCTURE: CPT | Performed by: INTERNAL MEDICINE

## 2017-12-14 PROCEDURE — 85027 COMPLETE CBC AUTOMATED: CPT | Performed by: INTERNAL MEDICINE

## 2017-12-14 PROCEDURE — 83036 HEMOGLOBIN GLYCOSYLATED A1C: CPT | Performed by: NURSE PRACTITIONER

## 2017-12-14 PROCEDURE — 25000131 ZZH RX MED GY IP 250 OP 636 PS 637: Mod: GY | Performed by: NURSE PRACTITIONER

## 2017-12-14 PROCEDURE — 25000128 H RX IP 250 OP 636: Performed by: NURSE PRACTITIONER

## 2017-12-14 RX ORDER — FUROSEMIDE 20 MG
20 TABLET ORAL DAILY
Qty: 30 TABLET | Refills: 0 | Status: SHIPPED | OUTPATIENT
Start: 2017-12-14 | End: 2018-01-04

## 2017-12-14 RX ORDER — NITROGLYCERIN 0.4 MG/1
TABLET SUBLINGUAL
Qty: 25 TABLET | Refills: 3 | Status: ON HOLD | OUTPATIENT
Start: 2017-12-14 | End: 2020-11-10

## 2017-12-14 RX ORDER — POTASSIUM CL/LIDO/0.9 % NACL 10MEQ/0.1L
10 INTRAVENOUS SOLUTION, PIGGYBACK (ML) INTRAVENOUS
Status: DISCONTINUED | OUTPATIENT
Start: 2017-12-14 | End: 2017-12-14 | Stop reason: HOSPADM

## 2017-12-14 RX ORDER — POTASSIUM CHLORIDE 750 MG/1
40 TABLET, EXTENDED RELEASE ORAL ONCE
Status: COMPLETED | OUTPATIENT
Start: 2017-12-14 | End: 2017-12-14

## 2017-12-14 RX ORDER — DEXTROSE MONOHYDRATE 25 G/50ML
25-50 INJECTION, SOLUTION INTRAVENOUS
Status: DISCONTINUED | OUTPATIENT
Start: 2017-12-14 | End: 2017-12-14 | Stop reason: HOSPADM

## 2017-12-14 RX ORDER — POTASSIUM CHLORIDE 1.5 G/1.58G
20-40 POWDER, FOR SOLUTION ORAL
Status: DISCONTINUED | OUTPATIENT
Start: 2017-12-14 | End: 2017-12-14 | Stop reason: HOSPADM

## 2017-12-14 RX ORDER — PREDNISOLONE ACETATE 10 MG/ML
1 SUSPENSION/ DROPS OPHTHALMIC 4 TIMES DAILY
Status: DISCONTINUED | OUTPATIENT
Start: 2017-12-14 | End: 2017-12-14 | Stop reason: HOSPADM

## 2017-12-14 RX ORDER — POTASSIUM CHLORIDE 29.8 MG/ML
20 INJECTION INTRAVENOUS
Status: DISCONTINUED | OUTPATIENT
Start: 2017-12-14 | End: 2017-12-14

## 2017-12-14 RX ORDER — FUROSEMIDE 10 MG/ML
20 INJECTION INTRAMUSCULAR; INTRAVENOUS ONCE
Status: DISCONTINUED | OUTPATIENT
Start: 2017-12-14 | End: 2017-12-14

## 2017-12-14 RX ORDER — FUROSEMIDE 10 MG/ML
40 INJECTION INTRAMUSCULAR; INTRAVENOUS ONCE
Status: COMPLETED | OUTPATIENT
Start: 2017-12-14 | End: 2017-12-14

## 2017-12-14 RX ORDER — NICOTINE POLACRILEX 4 MG
15-30 LOZENGE BUCCAL
Status: DISCONTINUED | OUTPATIENT
Start: 2017-12-14 | End: 2017-12-14 | Stop reason: HOSPADM

## 2017-12-14 RX ORDER — POTASSIUM CHLORIDE 7.45 MG/ML
10 INJECTION INTRAVENOUS
Status: DISCONTINUED | OUTPATIENT
Start: 2017-12-14 | End: 2017-12-14

## 2017-12-14 RX ORDER — POTASSIUM CHLORIDE 750 MG/1
20-40 TABLET, EXTENDED RELEASE ORAL
Status: DISCONTINUED | OUTPATIENT
Start: 2017-12-14 | End: 2017-12-14 | Stop reason: HOSPADM

## 2017-12-14 RX ADMIN — POTASSIUM CHLORIDE 40 MEQ: 750 TABLET, EXTENDED RELEASE ORAL at 00:36

## 2017-12-14 RX ADMIN — INSULIN ASPART 1 UNITS: 100 INJECTION, SOLUTION INTRAVENOUS; SUBCUTANEOUS at 12:25

## 2017-12-14 RX ADMIN — PREDNISOLONE ACETATE 1 DROP: 10 SUSPENSION/ DROPS OPHTHALMIC at 12:06

## 2017-12-14 RX ADMIN — OMEPRAZOLE 20 MG: 20 CAPSULE, DELAYED RELEASE ORAL at 07:39

## 2017-12-14 RX ADMIN — ASPIRIN 81 MG CHEWABLE TABLET 81 MG: 81 TABLET CHEWABLE at 07:39

## 2017-12-14 RX ADMIN — TOBRAMYCIN AND DEXAMETHASONE 1 DROP: 3; 1 SUSPENSION/ DROPS OPHTHALMIC at 07:39

## 2017-12-14 RX ADMIN — IRBESARTAN 300 MG: 75 TABLET ORAL at 07:40

## 2017-12-14 RX ADMIN — METOPROLOL TARTRATE 25 MG: 25 TABLET ORAL at 07:39

## 2017-12-14 RX ADMIN — PREDNISOLONE ACETATE 1 DROP: 10 SUSPENSION/ DROPS OPHTHALMIC at 07:39

## 2017-12-14 RX ADMIN — OFLOXACIN 1 DROP: 3 SOLUTION/ DROPS OPHTHALMIC at 12:06

## 2017-12-14 RX ADMIN — FUROSEMIDE 40 MG: 10 INJECTION, SOLUTION INTRAVENOUS at 07:40

## 2017-12-14 RX ADMIN — OFLOXACIN 1 DROP: 3 SOLUTION/ DROPS OPHTHALMIC at 07:39

## 2017-12-14 RX ADMIN — TOBRAMYCIN AND DEXAMETHASONE 1 DROP: 3; 1 SUSPENSION/ DROPS OPHTHALMIC at 12:06

## 2017-12-14 NOTE — PROCEDURES
PROCEDURES PERFORMED:   Coronary Angiography  Left Heart Catheterization    PHYSICIANS:  Attending Physician: Dr. Apodaca  Interventional Cardiology Fellow: ANDERSON Barahona MD, PhD    INDICATION:  85 y/o female with hx of HLD, GERD, recent cataract surgery with staph infection who presented with chest discomfort and positive troponins to 1.3    DESCRIPTION:  1. Consent obtained with discussion of risks.  All questions were answered.  2. Sterile prep and procedure.  3. Location with Sheaths:   RFA 4 Fr  4. Access: Local anesthetic with lidocaine.  A micropuncture 18 guage needle with ultrasound guidance was used to establish vascular access using a modified Seldinger technique.  5. Diagnostic Catheters:   6 Fr  3DRC and JL 4  6. Estimated blood loss: < 5 ml    The procedure was performed under conscious sedation for 16 minutes from 1822 to 1838.  The patient was assessed immediately before the first sedation medication was administered.  Midazolam 0.5 mg and Fentanyl 25 mcg were administered.  Heart rate, BP, respiration, oxygen saturation and patient responses were monitored throughout the procedure with the assistance of the RN under my supervision.    CORONARY ANGIOGRAM:   -Both coronary arteries arise from their respective cusps.  -Dominance: Left  -LM has no evidence of coronary artery disease.  -LAD: Type 3 [LAD supplies the entire apex]. The LAD gives rise to septal perforators, D1, D2 and D3.  No angiographic evidence of disease.  -LCX gives rise to OM1,OM2, OM3, LPDA. No angiographic evidence of disease.  -RCA (non-dominant), small in calibre, no evidence of disease.    Left Heart Catheterization:  LVEDP 21mmHg  No gradient on pullback across aortic valve    Sheath Removal:  The RFA sheath was manually removed in the cardiac catheterization laboratory.    Contrast: Isovue, 44 ml     Fluoroscopy Time: 4.1 min    COMPLICATIONS:  1. None    SUMMARY:   CAD    PLAN:   ASA and high dose statin  BB and ARB already  in place  LVEDP 21mmHg, gave additional 40mg IV lasix  Return to CSI service for ongoing care    The attending interventional cardiologist was present and supervised all critical aspects the procedure.    See CVIS report for final draft.    ANDERSON Barahona MD, PhD   Cardiology Fellow

## 2017-12-14 NOTE — CONSULTS
"Dimple is a 84 year old female presenting with newly reduced EF. CORE consult requested. Dimple is currently admitted with new systolic heart failure likely stress induced cardiomyopathy    Dimple was provided with a CHF book.  We discussed at length what is heart failure and stress induced cardiomyopathy and how to begin to manage it. I reviewed the importance of daily weights, 2 gm sodium diet, 2L fluid restriction and compliance with medications upon hospital discharge.  CORE contact phone numbers provided and patient is encouraged to call with any questions or concerns, including any weight gain or loss of 2 or more pounds in 24 hours or 5 or more pounds in 1 week.      Appointment made in CORE clinic on 17 with Brianna Larkin at 9:30am, labs at 9am.  I will follow-up with the patient at that time, sooner if needed.  Thank you for the consult.    Yajaira Muller RN BSN CHFN  Cardiology Care Coordinator [facility]  Questions and [phone]  First press #1 for the [place] and then press #3 for \"Medical Advise\" to reach us Cardiology Nurses.     "

## 2017-12-14 NOTE — PROGRESS NOTES
Care Coordinator- Discharge Planning     Admission Date/Time:  12/13/2017  Attending MD:  Eliud Apodaca MD     Data  Chart reviewed, discussed with interdisciplinary team.   Patient was admitted for:   1. Elevated troponin    2. ACS (acute coronary syndrome) (H)    3. Subendocardial infarction (H)    4. Essential hypertension, malignant    5. Acute myocardial infarction, subendocardial infarction, initial episode of care (H)         Assessment  Per chart review (SW assessment) and interdisciplinary rounds pt lives alone on the 5th floor of her apartment (with elevator) and is independent with her cares. Referral placed by MD for CORE Clinic follow up, CORE RN to see prior to discharge for education and to schedule follow up appointment. No other discharge needs identified per MD.       Plan  Anticipated Discharge Date: 12/14/2017  Anticipated Discharge Plan: Discharge to home    CTS Handoff completed:  YES  Daxa Conway RN BSN  6C Unit Care Coordinator  Phone number: [phone]  Pager: [phone]

## 2017-12-14 NOTE — PROGRESS NOTES
"Doing fairly well post-cath yesterday. R groin site C/D/I. Denies pain.  Loose BM x2 today. Good appetite. Voiding without difficulty.  BG was elevated last night. Attempted to draw Hgb A1c level today but due to various lab complications and multiple draw attempts, unable to obtain final result as pt refusing to be poked again at this time. Pt had also already eaten breakfast this morning, so a.m glucose level not accurate either. Pt will need to have level drawn and monitored from her primary MD. Pt reports her primary MD has talked to her in the past about \"watching it\".  Plan to start diuretic. New rx will be sent to our [facility] pharmacy prior to d/c home later today.  Pt will f/u in the CORE clinic; RN here now for initial introduction before d/c.      "

## 2017-12-14 NOTE — DISCHARGE INSTRUCTIONS
Takotsubo Cardiomyopathy (Broken Heart Syndrome)    Takotsubo cardiomyopathy (TCM) is a type of heart condition. It causes sudden chest pain. The symptoms of TCM can be like those of a heart attack.  With TCM, blood flow to part of the heart is briefly blocked. This might happen if the coronary arteries have a temporary spasm. It might also occur if the smaller blood vessels of the heart don t get enough blood. Although the symptoms of TCM may feel like a heart attack, the two conditions are different. During a heart attack, a major blockage in one of the coronary arteries from a blood clot or plaque buildup triggers symptoms. In TCM, the blockage is transient.  TCM is not very common. It most often occurs in older women. But it can happen to men and younger women.  What causes Takotsubo cardiomyopathy?  Experts are still trying to understand what causes TCM. Some think it might result from a brief spasm of the coronary arteries. Others think reduced blood flow to the smaller blood vessels of the heart may trigger it. Excess release of stress hormones such as adrenaline may also play a role.  Experts do know that intense emotions such as grief, fear, or sadness may trigger TCM. That s why the condition is sometimes called broken heart syndrome. A sudden illness may also precede it. TCM might be triggered by:    Death of a loved one    Domestic abuse    Natural disasters    Major financial loss    Asthma flare    Surgery    Chemotherapy    Accidental overdose of adrenaline    Adrenaline-producing tumor  In some cases, experts can t pinpoint a clear cause for TCM.  You may have a higher risk for TCM if one of your family members had it. Having an anxiety disorder also seems to raise a person s risk. Traditional risk factors for a heart attack, such as smoking, do not make you more prone to TCM.  What are the symptoms of Takotsubo cardiomyopathy?  Symptoms of TCM may resemble a heart attack. During an episode, you  might have:    Sudden, strong chest pain (the most common symptom)    Neck or left arm pain    Shortness of breath    Fainting, dizziness, or lightheadedness    Pale appearance    Sudden, severe fatigue    Sweating  How is Takotsubo cardiomyopathy treated?  Since TCM may mimic an acute heart attack, it may be treated like a heart attack. This may include aspirin, nitroglycerin, blood thinners, and even heart procedures to look at blood flow in the heart arteries. Part of the diagnosis for symptoms may include an echocardiogram or ultrasound of your heart and a coronary angiogram to make sure you do not have a blocked coronary artery. Blood samples will be checked to evaluate the stress on your heart.  You may need to stay in the intensive care unit (ICU) for at least 24 hours. Treatment might include:    Oxygen therapy to increase oxygen in your blood    Intravenous (IV) fluids if your doctor thinks you are dehydrated    Beta-blocker drugs to help aid heart recovery    Angiotensin converting enzyme (ACE) inhibitor drugs to aid heart recovery    Blood-thinner medicines (anticoagulants) to help prevent stroke    Psychological therapy to address problems such as anxiety and stress  Most people fully recover from TCM in 1 to 4 weeks. However, some do not, especially if they are older in age. Short-term risks of heart failure and abnormal heart rhythm can also pose danger to people with TCM especially early on in the disease.  When to call your healthcare provider  If you have sudden severe chest pain or other signs of a heart attack or TCM, go to the Emergency Department or call 911.   Date Last Reviewed: 12/1/2016 2000-2017 [employer]. [address] [place] [address], [place] [address]. All rights reserved. This information is not intended as a substitute for professional medical care. Always follow your healthcare professional's instructions.

## 2017-12-14 NOTE — PLAN OF CARE
Problem: Patient Care Overview  Goal: Plan of Care/Patient Progress Review  Outcome: No Change  D: S/p Angiogram  I/A: VSS. Denies pain. Sinus with freq. PVCs. MD ordered 40mEq of K+ to be given for a result of 3.8. Groin site intact. Patient has been sleeping well this shift.  P: Continue to monitor.

## 2017-12-15 ENCOUNTER — CARE COORDINATION (OUTPATIENT)
Dept: CARDIOLOGY | Facility: CLINIC | Age: 82
End: 2017-12-15

## 2017-12-15 ENCOUNTER — TELEPHONE (OUTPATIENT)
Dept: FAMILY MEDICINE | Facility: CLINIC | Age: 82
End: 2017-12-15

## 2017-12-15 ENCOUNTER — CARE COORDINATION (OUTPATIENT)
Dept: CARE COORDINATION | Facility: CLINIC | Age: 82
End: 2017-12-15

## 2017-12-15 NOTE — PROGRESS NOTES
I called Dimple Oviedo this morning to follow-up with her post-hospitalization discharged from [facility] 12/14/17.     No answer.  I left a detailed voicemail and asked Dimple to call me back.    Follow-up appointment review:  I reviewed Dimple's follow-up appointments. I reviewed the CORE clinic's phone number and to call with any increase of SOB, increased edema or swelling, and weight gain. Yajaira Muller RN

## 2017-12-15 NOTE — TELEPHONE ENCOUNTER
ED / Discharge Outreach Protocol    Patient Contact    Attempt # 1    Was call answered?  No.  Left message on voicemail with information to call me back.  SHASHANK MurrellN, RN

## 2017-12-15 NOTE — PROGRESS NOTES
"[facility]: Post-Discharge Note  SITUATION                                                      Admission:    Admission Date: 12/13/17   Reason for Admission: experiencing chest pain   Discharge:    Discharge Date: 12/14/17   Discharge Diagnosis: underwent a coronary angiogram   Discharge Service: Cardiology           ASSESSMENT      Patient reports symptoms are: New (Patient says she is really \"jittery\" do to her issues with her Thyroid)  Does the patient have all of their medications?: Yes  Does patient know what their new medications are for?: Yes  Does paient have a follow-up appointment scheduled?: Yes  Does patient have any other questions or concerns?: No      PLAN                                                      Outpatient Plan:  Routed FYI to Cardiology where she was seen and will f/u and to Roxi Gill RN who attempted to f/u with patient and left message    Future Appointments  Date Time Provider Department Center   12/18/2017 1:00 PM UUUS2 [facility] O   12/18/2017 1:30 PM [id number] [facility] O   12/19/2017 1:30 PM UUNM2 [facility] O   12/22/2017 9:00 AM  LAB UCLAB [facility]   12/22/2017 9:30 AM Brianna Larkin, HERMILA [facility]           Julissa Camilo RN                  "

## 2017-12-18 ENCOUNTER — HOSPITAL ENCOUNTER (OUTPATIENT)
Dept: ULTRASOUND IMAGING | Facility: CLINIC | Age: 82
Discharge: HOME | End: 2017-12-18
Attending: INTERNAL MEDICINE | Admitting: INTERNAL MEDICINE
Payer: MEDICARE

## 2017-12-18 ENCOUNTER — PRE VISIT (OUTPATIENT)
Dept: CARDIOLOGY | Facility: CLINIC | Age: 82
End: 2017-12-18

## 2017-12-18 DIAGNOSIS — E04.2 MULTINODULAR GOITER: ICD-10-CM

## 2017-12-18 DIAGNOSIS — I50.22 CHRONIC SYSTOLIC HEART FAILURE (H): Primary | ICD-10-CM

## 2017-12-18 PROCEDURE — 76536 US EXAM OF HEAD AND NECK: CPT

## 2017-12-18 NOTE — TELEPHONE ENCOUNTER
"ED / Discharge Outreach Protocol    Patient Contact    Attempt # 2    Was call answered?  No.  Left message on voicemail with information to call me back.  \"Juan Manuel this is Rosa, a nurse from [facility], calling to follow up regarding your recent [place] visit. Please call [phone] and ask to speak to a nurse if you have any further questions or concerns. Thank you.\"  ATUL Lee     From chart review-  Pt has appt with pcp for 12/26/17.  Pt is seeing core clinic on 12/19/17.  ATUL Lee      "

## 2017-12-18 NOTE — TELEPHONE ENCOUNTER
"Care Coordination.  --Patient states she had a heart attack.  --Discharge summary is not completed.  --I do see new diagnosis in problem list.  --Hospital f/u charting below states to route to CC if new dx MI.      Hospital/TCU/ED for chronic condition Discharge Protocol    \"Hi, my name is Sana Pro, a registered nurse, and I am calling from [facility].  I am calling to follow up and see how things are going for you after your recent emergency visit/hospital/TCU stay.\"    Tell me how you are doing now that you are home?\" \"Terrible.\"  \"I am weak as a pup.\"    I asked patient if she would like me to check to see if she should be seen today and she said no.  She is heading out for ultrasound right  Now for her thyroid.  She sounds fine on the phone and has someone with her.    Discharge Instructions    \"Let's review your discharge instructions.  What is/are the follow-up recommendations?  Pt. Response: Patient listed all her f/u appts.    \"Has an appointment with your primary care provider been scheduled?\"   Yes. (confirm)    \"When you see the provider, I would recommend that you bring your medications with you.\"        Medications    \"Tell me what changed about your medicines when you discharged?\"    Changes to chronic meds?    Patient does not have time to review meds.  She will bring all her meds to office visit this Wednesday.    \"What questions do you have about your medications?\"    None     New diagnoses of heart failure, COPD, diabetes, or MI?    Yes - Care Coordination Referral needed                  Medication reconciliation completed? No, due to Patient is in a hurry to get to ultrasound appointment so will bring all her meds to office visit Wednesday or call back and ask for me today,.  Was MTM referral placed (*Make sure to put transitions as reason for referral)?   No    Call Summary    \"What questions or concerns do you have about your recent visit and your follow-up care?\"     none    \"If you " "have questions or things don't continue to improve, we encourage you contact us through the main clinic number (give number).  Even if the clinic is not open, triage nurses are available 24/7 to help you.     We would like you to know that our clinic has extended hours (provide information).  We also have urgent care (provide details on closest location and hours/contact info)\"      \"Thank you for your time and take care!\"         "

## 2017-12-19 ENCOUNTER — OFFICE VISIT (OUTPATIENT)
Dept: CARDIOLOGY | Facility: CLINIC | Age: 82
End: 2017-12-19
Attending: NURSE PRACTITIONER
Payer: MEDICARE

## 2017-12-19 VITALS
OXYGEN SATURATION: 97 % | DIASTOLIC BLOOD PRESSURE: 72 MMHG | SYSTOLIC BLOOD PRESSURE: 133 MMHG | HEART RATE: 63 BPM | WEIGHT: 157.8 LBS | HEIGHT: 57 IN | BODY MASS INDEX: 34.05 KG/M2

## 2017-12-19 DIAGNOSIS — I50.22 CHRONIC SYSTOLIC HEART FAILURE (H): ICD-10-CM

## 2017-12-19 DIAGNOSIS — I50.23 ACUTE ON CHRONIC SYSTOLIC CONGESTIVE HEART FAILURE (H): Primary | ICD-10-CM

## 2017-12-19 LAB
ANION GAP SERPL CALCULATED.3IONS-SCNC: 7 MMOL/L (ref 3–14)
BUN SERPL-MCNC: 30 MG/DL (ref 7–30)
CALCIUM SERPL-MCNC: 9 MG/DL (ref 8.5–10.1)
CHLORIDE SERPL-SCNC: 103 MMOL/L (ref 94–109)
CO2 SERPL-SCNC: 28 MMOL/L (ref 20–32)
CREAT SERPL-MCNC: 0.99 MG/DL (ref 0.52–1.04)
GFR SERPL CREATININE-BSD FRML MDRD: 53 ML/MIN/1.7M2
GLUCOSE SERPL-MCNC: 134 MG/DL (ref 70–99)
NT-PROBNP SERPL-MCNC: 2974 PG/ML (ref 0–450)
POTASSIUM SERPL-SCNC: 4.6 MMOL/L (ref 3.4–5.3)
SODIUM SERPL-SCNC: 138 MMOL/L (ref 133–144)

## 2017-12-19 PROCEDURE — 80048 BASIC METABOLIC PNL TOTAL CA: CPT | Performed by: NURSE PRACTITIONER

## 2017-12-19 PROCEDURE — 99214 OFFICE O/P EST MOD 30 MIN: CPT | Mod: ZP | Performed by: NURSE PRACTITIONER

## 2017-12-19 PROCEDURE — 36415 COLL VENOUS BLD VENIPUNCTURE: CPT | Performed by: NURSE PRACTITIONER

## 2017-12-19 PROCEDURE — 99212 OFFICE O/P EST SF 10 MIN: CPT | Mod: ZF

## 2017-12-19 PROCEDURE — 83880 ASSAY OF NATRIURETIC PEPTIDE: CPT | Performed by: NURSE PRACTITIONER

## 2017-12-19 RX ORDER — SPIRONOLACTONE 25 MG/1
12.5 TABLET ORAL DAILY
Qty: 30 TABLET | Refills: 1 | Status: SHIPPED | OUTPATIENT
Start: 2017-12-19 | End: 2018-01-24 | Stop reason: SINTOL

## 2017-12-19 ASSESSMENT — PAIN SCALES - GENERAL: PAINLEVEL: NO PAIN (0)

## 2017-12-19 NOTE — PATIENT INSTRUCTIONS
1.  Start spironolactone 12.5mg by mouth daily  2.  Labs in 7-10 days  3.  Return to CORE clinic 4-6 weeks  4.  Anticipate repeating echocardiogram in 2-3 months

## 2017-12-19 NOTE — LETTER
12/19/2017      RE: Dimple Oviedo  [address]   [facility] [address]       Dear Colleague,    Thank you for the opportunity to participate in the care of your patient, Dimple Oviedo, at the [facility] at [facility]. Please see a copy of my visit note below.    Advanced Heart Failure Clinic --- CORE Evaluation  December 19, 2017    HPI:   Ms. Dimple Oviedo is an 84yr old female with a history of hyperlipidemia, hypertension, multinodular goiter, GERD, hyperthyroidism, and newly diagnosed stress-induced cardiomyopathy who presents to clinic for hospital follow up.    She presented to [facility] 12/13 with persistent chest pain and shortness of breath, and was found to have an elevated troponin.  EKG was negative for ischemic changes.  Chest CT was negative for PE, but showed bilateral pleural effusions.  Echocardiogram showed normal LV size with mild concentric hypertrophy, LVEF 30-35%, and global akinesis with sparing of the basal segments consistent with stress cardiomyopathy vs multivessel CAD. Coronary angiogram revealed no obstructive CAD.  Findings all suggested stress-induced cardiomyopathy, so she was discharged on BB, ARB, statin, and aspirin.    Since discharge, she states that she feels well overall.  She has been weighing herself daily, and notes that her weight has remained stable.  She has been drinking less than 2L per day, and notes concern re: her h/o recurrent bladder infections, which she is more susceptible to when she doesn't drink adequate amounts.  She notes that her appetite is not great, and that she has not been eating much.  She has been constipated, and uses miralax and prune juice with relief.  She has not been sleeping well for the last year, which she attributes to familial stress.  She is able to lie flat, and denies PND and orthopnea.  She believes that she has woken up short of breath in the past, which she believes is  related to thinking about what the next day will bring.  She notes that her breathing has been good, and she notes some shortness of breath when walking.  She has not had any recurrent chest pain.  She notes intermittent palpitations, which do not bother her and do not persist.  She still feels weak and notes intermittent lightheadedness.  She otherwise denies headaches, fevers, chills, and fluid retention.    PAST MEDICAL HISTORY:  Past Medical History:   Diagnosis Date     Calculus of kidney      Esophageal reflux      Hyperlipidemia LDL goal <130 5/9/2010     Malignant melanoma of skin of trunk, except scrotum (H)      Nonspecific abnormal finding     has living will 2004 -      Nontoxic multinodular goiter     no further eval /tx rec per pt     Osteopenia      Other psoriasis      Personal history of colonic polyps      PMR (polymyalgia rheumatica) (H)      Undiagnosed cardiac murmurs      Unspecified constipation      Unspecified essential hypertension        FAMILY HISTORY:  Family History   Problem Relation Age of Onset     CANCER Father      dec - esophageal and laryngeal     HEART DISEASE Mother      Respiratory Mother      dec       SOCIAL HISTORY:  Social History     Social History     Marital status:      Spouse name:      Number of children: 2     Years of education: N/A     Occupational History      Retired     Social History Main Topics     Smoking status: Never Smoker     Smokeless tobacco: Never Used     Alcohol use No      Comment: 6 times per year-one drink     Drug use: No     Sexual activity: Yes     Partners: Male     Other Topics Concern      Service No     Blood Transfusions No     Exercise Yes     curves     Seat Belt Yes     Self-Exams Yes     Parent/Sibling W/ Cabg, Mi Or Angioplasty Before 65f 55m? No     Social History Narrative    December 7, 2009    Balanced Diet - Yes    Osteoporosis Prevention Measures - Dairy servings per day: 2 and Medication/Supplements (See  current meds)    Regular Exercise -  Yes Describe curves, walking    Dental Exam - YES - Date: 10/2009    Eye Exam - YES - Date: 2008    Self Breast Exam - Yes    Abuse: Current or Past (Physical, Sexual or Emotional)- No    Do you feel safe in your environment - Yes    Guns stored in the home - No    Sunscreen used - Yes    Seatbelts used - Yes    Lipids -  YES - Date: 11/24/2009    Glucose -  YES - Date: 11/24/2009    Colon Cancer Screening - Colonoscopy 11/18/2005(date completed)    Hemoccults - YES - Date: 10/12/2004    Pap Test -  YES - Date: 09/22/2004    Do you have any concerns about STD's -  No    Mammography - YES - Date: 11/24/2009    DEXA - YES - Date: 11/20/2008    Immunizations reviewed and up to date - Yes    PAULETTE Spencer CMA                   CURRENT MEDICATIONS:  Current Outpatient Prescriptions   Medication Sig Dispense Refill     nitroGLYcerin (NITROSTAT) 0.4 MG sublingual tablet For chest pain place 1 tablet under the tongue every 5 minutes for 3 doses. If symptoms persist 5 minutes after 1st dose call 911. 25 tablet 3     furosemide (LASIX) 20 MG tablet Take 1 tablet (20 mg) by mouth daily 30 tablet 0     propranolol (INDERAL LA) 60 MG 24 hr capsule Take 60 mg by mouth daily       ofloxacin (OCUFLOX) 0.3 % ophthalmic solution Place 1 drop into the right eye 4 times daily       tobramycin-dexamethasone (TOBRADEX) 0.3-0.1 % ophthalmic susp Place 1 drop into the right eye 4 times daily       CRANBERRY CONCENTRATE PO Take 1 capsule by mouth 2 times daily       lovastatin (MEVACOR) 40 MG tablet TAKE 1 TABLET AT BEDTIME (HYPERLIPIDEMIA LDL GOAL  BELOW 130) 90 tablet 2     omeprazole (PRILOSEC) 20 MG CR capsule Take 1 capsule (20 mg) by mouth daily 180 capsule 3     traMADol (ULTRAM) 50 MG tablet Take 1-2 tablets ( mg) by mouth every 8 hours as needed for pain maximum 2 tablet(s) per day 30 tablet 0     alendronate (FOSAMAX) 70 MG tablet TAKE 1 TABLET EVERY 7 DAYS AT LEAST 60 MIN BEFORE BREAKFAST  "AS DIRECTED \"SEE PACKAGE FOR ADDITIONAL INSTRUCTIONS\" 12 tablet 2     irbesartan (AVAPRO) 300 MG tablet TAKE 1 TABLET EVERY DAY 90 tablet 0     acetaminophen 650 MG TABS Take 650 mg by mouth every 8 hours as needed for mild pain or fever 100 tablet 0     polyethylene glycol (MIRALAX/GLYCOLAX) packet Take 17 g by mouth 2 times daily as needed for constipation 7 packet 0     Omega-3 Fatty Acids (FISH OIL) 500 MG CAPS Take 1 capsule by mouth 3 times daily       prednisoLONE acetate (PRED FORTE) 1 % ophthalmic suspension Place 1 drop into the right eye 4 times daily Taper as directed       melatonin 3 MG tablet Take 1 tablet (3 mg) by mouth nightly as needed for sleep 90 tablet 0     ASPIRIN 81 MG OR TABS ONE DAILY (Patient taking differently: ONE DAILY at bedtime) 100 3     CALCIUM 500 + D 500-200 MG-IU OR TABS 1 tablets 3 times daily  0     cycloSPORINE (RESTASIS) 0.05 % ophthalmic emulsion Place 1 drop into both eyes 2 times daily         ROS:   Constitutional: No fever, chills, or sweats. Stable weight.   ENT: No visual disturbance, ear ache, epistaxis, sore throat.   Allergies/Immunologic: Negative.   Respiratory: No cough, hemoptysis.   Cardiovascular: As per HPI.   GI: No nausea, vomiting, hematemesis, melena, or hematochezia.   : No urinary frequency, dysuria, or hematuria.   Integument: Negative.   Psychiatric: Negative.   Neuro: Negative.   Endocrinology: Negative.   Musculoskeletal: As per HPI.    EXAM:  /72 (BP Location: Left arm, Patient Position: Chair, Cuff Size: Adult Regular)  Pulse 63  Ht 1.448 m (4' 9\")  Wt 71.6 kg (157 lb 12.8 oz)  SpO2 97%  BMI 34.15 kg/m2  General: appears comfortable, alert and articulate  Head: normocephalic, atraumatic  Eyes: anicteric sclera, EOMI  Neck: no adenopathy  Orophyarynx: moist mucosa, no lesions, dentition intact  Heart: regular, S1/S2, no murmur, gallop, rub, JVD at clavicle when sitting upright  Lungs: clear, no rales or wheezing  Abdomen: soft, " non-tender, bowel sounds present, no hepatosplenomegaly  Extremities: no clubbing, cyanosis or LE edema  Neurological: normal speech and affect, no gross motor deficits    Labs:  CBC RESULTS:  Lab Results   Component Value Date    WBC 8.5 12/14/2017    RBC 4.37 12/14/2017    HGB 12.9 12/14/2017    HCT 40.7 12/14/2017    MCV 93 12/14/2017    MCH 29.5 12/14/2017    MCHC 31.7 12/14/2017    RDW 15.5 (H) 12/14/2017     12/14/2017       CMP RESULTS:  Lab Results   Component Value Date     12/19/2017    POTASSIUM 4.6 12/19/2017    CHLORIDE 103 12/19/2017    CO2 28 12/19/2017    ANIONGAP 7 12/19/2017     (H) 12/19/2017    BUN 30 12/19/2017    CR 0.99 12/19/2017    GFRESTIMATED 53 (L) 12/19/2017    GFRESTBLACK 64 12/19/2017    SHREYA 9.0 12/19/2017    BILITOTAL 0.5 12/13/2017    ALBUMIN 3.0 (L) 12/13/2017    ALKPHOS 74 12/13/2017    ALT 85 (H) 12/13/2017    AST 76 (H) 12/13/2017        INR RESULTS:  Lab Results   Component Value Date    INR 1.02 12/13/2017       Lab Results   Component Value Date    MAG 2.0 12/13/2017     Lab Results   Component Value Date    NTBNPI 1199 12/13/2017     Lab Results   Component Value Date    NTBNP 2974 (H) 12/19/2017       Assessment and Plan:   Ms. Dimple Oviedo is an 84yr old female with a history of hyperlipidemia, hypertension, multinodular goiter, GERD, hyperthyroidism, and newly diagnosed stress-induced cardiomyopathy who presents to clinic for hospital follow up.    Chronic systolic heart failure secondary to stress-induced cardiomyopathy  Stage C  NYHA Class III  ACEi/ARB:  Yes - continue irbesartan 300mg daily (has been taking since before to hospitalization)  BB:  Yes - prefers to continue propranolol 60mg daily due to its effect with her hyperthyroidism  Aldosterone antagonist:  Yes - starting spironolactone 12.5mg daily today.  Will recheck BMP in 7-10 days.  SCD prophylaxis:  n/a  Fluid status:  euvolemic - continue lasix 20mg daily.  Asked that she call with  continued weight loss, as I do not want her to get hypovolemic.  Follow-up:  Return to CORE clinic in 4-6 weeks.  Plan to repeat echocardiogram in 2-3 months.      The above was reviewed with Ms. Oviedo, who verbalized understanding and will call with further questions/concerns.    Brianna Larkin, DNP, APRN, FNP-BC  Advanced Heart Failure Nurse Practitioner  [facility]  [phone]        Patient Care Team:  Pop Zapien MD as PCP - General (Family Practice)  Vincenzo Tovar MD as MD (Family Medicine - Sports Medicine)  Candelaria Raymundo MD as MD (Gastroenterology)

## 2017-12-19 NOTE — PROGRESS NOTES
Advanced Heart Failure Clinic --- CORE Evaluation  December 19, 2017    HPI:   Ms. Dimple Oviedo is an 84yr old female with a history of hyperlipidemia, hypertension, multinodular goiter, GERD, hyperthyroidism, and newly diagnosed stress-induced cardiomyopathy who presents to clinic for hospital follow up.    She presented to [facility] 12/13 with persistent chest pain and shortness of breath, and was found to have an elevated troponin.  EKG was negative for ischemic changes.  Chest CT was negative for PE, but showed bilateral pleural effusions.  Echocardiogram showed normal LV size with mild concentric hypertrophy, LVEF 30-35%, and global akinesis with sparing of the basal segments consistent with stress cardiomyopathy vs multivessel CAD. Coronary angiogram revealed no obstructive CAD.  Findings all suggested stress-induced cardiomyopathy, so she was discharged on BB, ARB, statin, and aspirin.    Since discharge, she states that she feels well overall.  She has been weighing herself daily, and notes that her weight has remained stable.  She has been drinking less than 2L per day, and notes concern re: her h/o recurrent bladder infections, which she is more susceptible to when she doesn't drink adequate amounts.  She notes that her appetite is not great, and that she has not been eating much.  She has been constipated, and uses miralax and prune juice with relief.  She has not been sleeping well for the last year, which she attributes to familial stress.  She is able to lie flat, and denies PND and orthopnea.  She believes that she has woken up short of breath in the past, which she believes is related to thinking about what the next day will bring.  She notes that her breathing has been good, and she notes some shortness of breath when walking.  She has not had any recurrent chest pain.  She notes intermittent palpitations, which do not bother her and do not persist.  She still feels weak and notes intermittent  lightheadedness.  She otherwise denies headaches, fevers, chills, and fluid retention.    PAST MEDICAL HISTORY:  Past Medical History:   Diagnosis Date     Calculus of kidney      Esophageal reflux      Hyperlipidemia LDL goal <130 5/9/2010     Malignant melanoma of skin of trunk, except scrotum (H)      Nonspecific abnormal finding     has living will 2004 -      Nontoxic multinodular goiter     no further eval /tx rec per pt     Osteopenia      Other psoriasis      Personal history of colonic polyps      PMR (polymyalgia rheumatica) (H)      Undiagnosed cardiac murmurs      Unspecified constipation      Unspecified essential hypertension        FAMILY HISTORY:  Family History   Problem Relation Age of Onset     CANCER Father      dec - esophageal and laryngeal     HEART DISEASE Mother      Respiratory Mother      dec       SOCIAL HISTORY:  Social History     Social History     Marital status:      Spouse name:      Number of children: 2     Years of education: N/A     Occupational History      Retired     Social History Main Topics     Smoking status: Never Smoker     Smokeless tobacco: Never Used     Alcohol use No      Comment: 6 times per year-one drink     Drug use: No     Sexual activity: Yes     Partners: Male     Other Topics Concern      Service No     Blood Transfusions No     Exercise Yes     curves     Seat Belt Yes     Self-Exams Yes     Parent/Sibling W/ Cabg, Mi Or Angioplasty Before 65f 55m? No     Social History Narrative    December 7, 2009    Balanced Diet - Yes    Osteoporosis Prevention Measures - Dairy servings per day: 2 and Medication/Supplements (See current meds)    Regular Exercise -  Yes Describe curves, walking    Dental Exam - YES - Date: 10/2009    Eye Exam - YES - Date: 2008    Self Breast Exam - Yes    Abuse: Current or Past (Physical, Sexual or Emotional)- No    Do you feel safe in your environment - Yes    Guns stored in the home - No    Sunscreen used - Yes     "Seatbelts used - Yes    Lipids -  YES - Date: 11/24/2009    Glucose -  YES - Date: 11/24/2009    Colon Cancer Screening - Colonoscopy 11/18/2005(date completed)    Hemoccults - YES - Date: 10/12/2004    Pap Test -  YES - Date: 09/22/2004    Do you have any concerns about STD's -  No    Mammography - YES - Date: 11/24/2009    DEXA - YES - Date: 11/20/2008    Immunizations reviewed and up to date - Yes    PAULETTE Spencer CMA                   CURRENT MEDICATIONS:  Current Outpatient Prescriptions   Medication Sig Dispense Refill     nitroGLYcerin (NITROSTAT) 0.4 MG sublingual tablet For chest pain place 1 tablet under the tongue every 5 minutes for 3 doses. If symptoms persist 5 minutes after 1st dose call 911. 25 tablet 3     furosemide (LASIX) 20 MG tablet Take 1 tablet (20 mg) by mouth daily 30 tablet 0     propranolol (INDERAL LA) 60 MG 24 hr capsule Take 60 mg by mouth daily       ofloxacin (OCUFLOX) 0.3 % ophthalmic solution Place 1 drop into the right eye 4 times daily       tobramycin-dexamethasone (TOBRADEX) 0.3-0.1 % ophthalmic susp Place 1 drop into the right eye 4 times daily       CRANBERRY CONCENTRATE PO Take 1 capsule by mouth 2 times daily       lovastatin (MEVACOR) 40 MG tablet TAKE 1 TABLET AT BEDTIME (HYPERLIPIDEMIA LDL GOAL  BELOW 130) 90 tablet 2     omeprazole (PRILOSEC) 20 MG CR capsule Take 1 capsule (20 mg) by mouth daily 180 capsule 3     traMADol (ULTRAM) 50 MG tablet Take 1-2 tablets ( mg) by mouth every 8 hours as needed for pain maximum 2 tablet(s) per day 30 tablet 0     alendronate (FOSAMAX) 70 MG tablet TAKE 1 TABLET EVERY 7 DAYS AT LEAST 60 MIN BEFORE BREAKFAST AS DIRECTED \"SEE PACKAGE FOR ADDITIONAL INSTRUCTIONS\" 12 tablet 2     irbesartan (AVAPRO) 300 MG tablet TAKE 1 TABLET EVERY DAY 90 tablet 0     acetaminophen 650 MG TABS Take 650 mg by mouth every 8 hours as needed for mild pain or fever 100 tablet 0     polyethylene glycol (MIRALAX/GLYCOLAX) packet Take 17 g by mouth 2 times " "daily as needed for constipation 7 packet 0     Omega-3 Fatty Acids (FISH OIL) 500 MG CAPS Take 1 capsule by mouth 3 times daily       prednisoLONE acetate (PRED FORTE) 1 % ophthalmic suspension Place 1 drop into the right eye 4 times daily Taper as directed       melatonin 3 MG tablet Take 1 tablet (3 mg) by mouth nightly as needed for sleep 90 tablet 0     ASPIRIN 81 MG OR TABS ONE DAILY (Patient taking differently: ONE DAILY at bedtime) 100 3     CALCIUM 500 + D 500-200 MG-IU OR TABS 1 tablets 3 times daily  0     cycloSPORINE (RESTASIS) 0.05 % ophthalmic emulsion Place 1 drop into both eyes 2 times daily         ROS:   Constitutional: No fever, chills, or sweats. Stable weight.   ENT: No visual disturbance, ear ache, epistaxis, sore throat.   Allergies/Immunologic: Negative.   Respiratory: No cough, hemoptysis.   Cardiovascular: As per HPI.   GI: No nausea, vomiting, hematemesis, melena, or hematochezia.   : No urinary frequency, dysuria, or hematuria.   Integument: Negative.   Psychiatric: Negative.   Neuro: Negative.   Endocrinology: Negative.   Musculoskeletal: As per HPI.    EXAM:  /72 (BP Location: Left arm, Patient Position: Chair, Cuff Size: Adult Regular)  Pulse 63  Ht 1.448 m (4' 9\")  Wt 71.6 kg (157 lb 12.8 oz)  SpO2 97%  BMI 34.15 kg/m2  General: appears comfortable, alert and articulate  Head: normocephalic, atraumatic  Eyes: anicteric sclera, EOMI  Neck: no adenopathy  Orophyarynx: moist mucosa, no lesions, dentition intact  Heart: regular, S1/S2, no murmur, gallop, rub, JVD at clavicle when sitting upright  Lungs: clear, no rales or wheezing  Abdomen: soft, non-tender, bowel sounds present, no hepatosplenomegaly  Extremities: no clubbing, cyanosis or LE edema  Neurological: normal speech and affect, no gross motor deficits    Labs:  CBC RESULTS:  Lab Results   Component Value Date    WBC 8.5 12/14/2017    RBC 4.37 12/14/2017    HGB 12.9 12/14/2017    HCT 40.7 12/14/2017    MCV 93 " 12/14/2017    MCH 29.5 12/14/2017    MCHC 31.7 12/14/2017    RDW 15.5 (H) 12/14/2017     12/14/2017       CMP RESULTS:  Lab Results   Component Value Date     12/19/2017    POTASSIUM 4.6 12/19/2017    CHLORIDE 103 12/19/2017    CO2 28 12/19/2017    ANIONGAP 7 12/19/2017     (H) 12/19/2017    BUN 30 12/19/2017    CR 0.99 12/19/2017    GFRESTIMATED 53 (L) 12/19/2017    GFRESTBLACK 64 12/19/2017    SHREYA 9.0 12/19/2017    BILITOTAL 0.5 12/13/2017    ALBUMIN 3.0 (L) 12/13/2017    ALKPHOS 74 12/13/2017    ALT 85 (H) 12/13/2017    AST 76 (H) 12/13/2017        INR RESULTS:  Lab Results   Component Value Date    INR 1.02 12/13/2017       Lab Results   Component Value Date    MAG 2.0 12/13/2017     Lab Results   Component Value Date    NTBNPI 1199 12/13/2017     Lab Results   Component Value Date    NTBNP 2974 (H) 12/19/2017       Assessment and Plan:   Ms. Dimple Oviedo is an 84yr old female with a history of hyperlipidemia, hypertension, multinodular goiter, GERD, hyperthyroidism, and newly diagnosed stress-induced cardiomyopathy who presents to clinic for hospital follow up.    Chronic systolic heart failure secondary to stress-induced cardiomyopathy  Stage C  NYHA Class III  ACEi/ARB:  Yes - continue irbesartan 300mg daily (has been taking since before to hospitalization)  BB:  Yes - prefers to continue propranolol 60mg daily due to its effect with her hyperthyroidism  Aldosterone antagonist:  Yes - starting spironolactone 12.5mg daily today.  Will recheck BMP in 7-10 days.  SCD prophylaxis:  n/a  Fluid status:  euvolemic - continue lasix 20mg daily.  Asked that she call with continued weight loss, as I do not want her to get hypovolemic.  Follow-up:  Return to CORE clinic in 4-6 weeks.  Plan to repeat echocardiogram in 2-3 months.      The above was reviewed with Ms. Oviedo, who verbalized understanding and will call with further questions/concerns.    Brianna Larkin, DNP, APRN, FNP-BC  Advanced Heart  Failure Nurse Practitioner  [facility]  [phone]      CC  Patient Care Team:  Pop Zapien MD as PCP - General (Family Practice)  Vincenzo Tovar MD as MD (Family Medicine - Sports Medicine)  Candelaria Raymundo MD as MD (Gastroenterology)  INPATIENT, ATTENDING PHYSICIAN [facility]

## 2017-12-19 NOTE — NURSING NOTE
Chief Complaint   Patient presents with     Follow Up For     New CORE; 84yr old female with new diagnosis of stress induced cardiomyopathy presenting post hospitalization for follow-up with labs prior.     Vitals were taken and medications were reconciled.    Mariajose Duran CMA     10:18 AM

## 2017-12-19 NOTE — MR AVS SNAPSHOT
After Visit Summary   12/19/2017    Dimple Oviedo    MRN: [id number]           Patient Information     Date Of Birth          6/23/1933        Visit Information        Provider Department      12/19/2017 11:30 AM Brianna Larkin NP [facility]        Today's Diagnoses     Acute on chronic systolic congestive heart failure (H)    -  1      Care Instructions    1.  Start spironolactone 12.5mg by mouth daily  2.  Labs in 7-10 days  3.  Return to [facility]ORE clinic 4-6 weeks  4.  Anticipate repeating echocardiogram in 2-3 months          Follow-ups after your visit        Your next 10 appointments already scheduled     Dec 20, 2017  1:40 PM CST   Office Visit with Pop Zapien MD   [facility] ([facility]    [address] [place] [address] [phone]           Bring a current list of meds and any records pertaining to this visit. For Physicals, please bring immunization records and any forms needing to be filled out. Please arrive 10 minutes early to complete paperwork.            Jan 24, 2018  1:30 PM CST   Lab with  LAB   [facility] ([facility])    [address] [id number] [phone]            Jan 24, 2018  2:00 PM CST   (Arrive by 1:45 PM)   CORE RETURN with Lynn Cyr NP   [facility] ([facility])    [address] [id number] [phone]              Future tests that were ordered for you today     Open Future Orders        Priority Expected Expires Ordered    Basic metabolic panel Routine  12/19/2018 12/19/2017            Who to contact     If you have questions or need follow up information about today's clinic visit or your schedule please contact [facility] directly at [phone].  Normal or non-critical lab and imaging results will be communicated to you by MyChart, letter or  "phone within 4 business days after the clinic has received the results. If you do not hear from us within 7 days, please contact the clinic through [email or web address] or phone. If you have a critical or abnormal lab result, we will notify you by phone as soon as possible.  Submit refill requests through [email or web address] or call your pharmacy and they will forward the refill request to us. Please allow 3 business days for your refill to be completed.          Additional Information About Your Visit        [email or web address] Information     [email or web address] gives you secure access to your electronic health record. If you see a primary care provider, you can also send messages to your care team and make appointments. If you have questions, please call your primary care clinic.  If you do not have a primary care provider, please call [phone] and they will assist you.        Care EveryWhere ID     This is your Care EveryWhere ID. This could be used by other organizations to access your [facility] medical records  [id number]        Your Vitals Were     Pulse Height Pulse Oximetry BMI (Body Mass Index)          63 1.448 m (4' 9\") 97% 34.15 kg/m2         Blood Pressure from Last 3 Encounters:   12/19/17 133/72   12/14/17 128/53   11/30/17 119/41    Weight from Last 3 Encounters:   12/19/17 71.6 kg (157 lb 12.8 oz)   12/14/17 72.2 kg (159 lb 3.2 oz)   11/30/17 75.8 kg (167 lb)                 Today's Medication Changes          These changes are accurate as of: 12/19/17 12:39 PM.  If you have any questions, ask your nurse or doctor.               Start taking these medicines.        Dose/Directions    spironolactone 25 MG tablet   Commonly known as:  ALDACTONE   Used for:  Acute on chronic systolic congestive heart failure (H)   Started by:  Brianna Larkin, NP        Dose:  12.5 mg   Take 0.5 tablets (12.5 mg) by mouth daily   Quantity:  30 tablet   Refills:  1         These medicines have changed or have updated prescriptions.        Dose/Directions    " aspirin 81 MG tablet   This may have changed:  See the new instructions.   Used for:  Routine medical exam        ONE DAILY   Quantity:  100   Refills:  3         Stop taking these medicines if you haven't already. Please contact your care team if you have questions.     traMADol 50 MG tablet   Commonly known as:  ULTRAM   Stopped by:  Brianna Larkin, HERMILA                Where to get your medicines      These medications were sent to [facility] [address] [place] [address] [address] [facility]  [address], [facility] [address]    Hours:  24-hours Phone:  [phone]     spironolactone 25 MG tablet                Primary Care Provider Office Phone # Fax #    Pop Antonino Zapien [phone] [address] [place] [address]        Equal Access to Services     FILEMONKindred HospitalELISEO : Aleja wu Sogabo, waaxda luqadaha, qaybta kaalmada adeurszulayacrsitian, maldonado seay . So [facility] [phone].    ATENCIÓN: Si habla español, tiene a sierra disposición servicios gratuitos de asistencia lingüística. [facility] [phone].    We comply with applicable federal civil rights laws and [place] laws. We do not discriminate on the basis of race, color, national origin, age, disability, sex, sexual orientation, or gender identity.            Thank you!     Thank you for choosing [facility]  for your care. Our goal is always to provide you with excellent care. Hearing back from our patients is one way we can continue to improve our services. Please take a few minutes to complete the written survey that you may receive in the mail after your visit with us. Thank you!             Your Updated Medication List - Protect others around you: Learn how to safely use, store and throw away your medicines at www.disposemymeds.org.          This list is accurate as of: 12/19/17 12:39 PM.  Always use your most recent med list.                    "Brand Name Dispense Instructions for use Diagnosis    acetaminophen 650 MG 8 hour tablet     100 tablet    Take 650 mg by mouth every 8 hours as needed for mild pain or fever    Ascending cholangitis       alendronate 70 MG tablet    FOSAMAX    12 tablet    TAKE 1 TABLET EVERY 7 DAYS AT LEAST 60 MIN BEFORE BREAKFAST AS DIRECTED \"SEE PACKAGE FOR ADDITIONAL INSTRUCTIONS\"    High risk medication use, Osteopenia       aspirin 81 MG tablet     100    ONE DAILY    Routine medical exam       CALCIUM 500 + D 500-200 MG-IU Tabs      1 tablets 3 times daily        CRANBERRY CONCENTRATE PO      Take 1 capsule by mouth 2 times daily        Fish Oil 500 MG Caps      Take 1 capsule by mouth 3 times daily        furosemide 20 MG tablet    LASIX    30 tablet    Take 1 tablet (20 mg) by mouth daily    Elevated troponin       irbesartan 300 MG tablet    AVAPRO    90 tablet    TAKE 1 TABLET EVERY DAY    Essential hypertension with goal blood pressure less than 140/90       lovastatin 40 MG tablet    MEVACOR    90 tablet    TAKE 1 TABLET AT BEDTIME (HYPERLIPIDEMIA LDL GOAL  BELOW 130)    Hyperlipidemia LDL goal <130       melatonin 3 MG tablet     90 tablet    Take 1 tablet (3 mg) by mouth nightly as needed for sleep    Insomnia       nitroGLYcerin 0.4 MG sublingual tablet    NITROSTAT    25 tablet    For chest pain place 1 tablet under the tongue every 5 minutes for 3 doses. If symptoms persist 5 minutes after 1st dose call 911.    Elevated troponin       ofloxacin 0.3 % ophthalmic solution    OCUFLOX     Place 1 drop into the right eye 4 times daily        omeprazole 20 MG CR capsule    priLOSEC    180 capsule    Take 1 capsule (20 mg) by mouth daily    Gastroesophageal reflux disease without esophagitis       polyethylene glycol Packet    MIRALAX/GLYCOLAX    7 packet    Take 17 g by mouth 2 times daily as needed for constipation    Other constipation       prednisoLONE acetate 1 % ophthalmic susp    PRED FORTE     Place 1 drop into " the right eye 4 times daily Taper as directed        propranolol 60 MG 24 hr capsule    INDERAL LA     Take 60 mg by mouth daily        RESTASIS 0.05 % ophthalmic emulsion   Generic drug:  cycloSPORINE      Place 1 drop into both eyes 2 times daily        spironolactone 25 MG tablet    ALDACTONE    30 tablet    Take 0.5 tablets (12.5 mg) by mouth daily    Acute on chronic systolic congestive heart failure (H)       tobramycin-dexamethasone 0.3-0.1 % ophthalmic susp    TOBRADEX     Place 1 drop into the right eye 4 times daily

## 2017-12-20 ENCOUNTER — TRANSFERRED RECORDS (OUTPATIENT)
Dept: HEALTH INFORMATION MANAGEMENT | Facility: CLINIC | Age: 82
End: 2017-12-20

## 2017-12-20 ENCOUNTER — OFFICE VISIT (OUTPATIENT)
Dept: FAMILY MEDICINE | Facility: CLINIC | Age: 82
End: 2017-12-20
Payer: MEDICARE

## 2017-12-20 VITALS
OXYGEN SATURATION: 98 % | HEART RATE: 73 BPM | RESPIRATION RATE: 16 BRPM | DIASTOLIC BLOOD PRESSURE: 62 MMHG | BODY MASS INDEX: 35.27 KG/M2 | SYSTOLIC BLOOD PRESSURE: 124 MMHG | TEMPERATURE: 97.4 F | WEIGHT: 163 LBS

## 2017-12-20 DIAGNOSIS — I10 HYPERTENSION GOAL BP (BLOOD PRESSURE) < 140/90: ICD-10-CM

## 2017-12-20 DIAGNOSIS — E04.2 NONTOXIC MULTINODULAR GOITER: ICD-10-CM

## 2017-12-20 DIAGNOSIS — I50.23 ACUTE ON CHRONIC SYSTOLIC CONGESTIVE HEART FAILURE (H): Primary | ICD-10-CM

## 2017-12-20 DIAGNOSIS — I21.4 NSTEMI (NON-ST ELEVATED MYOCARDIAL INFARCTION) (H): ICD-10-CM

## 2017-12-20 PROCEDURE — 99214 OFFICE O/P EST MOD 30 MIN: CPT | Performed by: FAMILY MEDICINE

## 2017-12-20 NOTE — PROGRESS NOTES
SUBJECTIVE:   Dimple Oviedo is a 84 year old female who presents to clinic today for the following health issues:      Hospital Follow-up Visit:    Hospital/Nursing Home/IP Rehab Facility: [facility]  Date of Admission: 12/13/17  Date of Discharge: 12/14/17  Reason(s) for Admission: heart attack- chest pain            Problems taking medications regularly:  None       Medication changes since discharge: None       Problems adhering to non-medication therapy:  None    Summary of hospitalization:  [facility] discharge summary reviewed  Diagnostic Tests/Treatments reviewed.  Follow up needed: none  Other Healthcare Providers Involved in Patient s Care:         cardiology  Update since discharge: improved.      Post Discharge Medication Reconciliation: discharge medications reconciled, continue medications without change.  Plan of care communicated with patient and family     Coding guidelines for this visit:  Type of Medical   Decision Making Face-to-Face Visit       within 7 Days of discharge Face-to-Face Visit        within 14 days of discharge   Moderate Complexity [id number]   High Complexity 89152 [id number]          Here with daughter in law.     Fatigued. Overall better.     Been to endocrine for thyroid. Had thyroid us. Going to follow up with endocrine next week.  Having some jitteriness    Rheumatologist in April of this year. PMR - still doing OK.     Problem list and histories reviewed & adjusted, as indicated.  Additional history: as documented    Labs reviewed in EPIC    Reviewed and updated as needed this visit by clinical staff       Reviewed and updated as needed this visit by Provider           Social History     Social History     Marital status:      Spouse name:      Number of children: 2     Years of education: N/A     Occupational History      Retired     Social History Main Topics     Smoking status: Never Smoker     Smokeless tobacco: Never Used      Alcohol use No      Comment: 6 times per year-one drink     Drug use: No     Sexual activity: Yes     Partners: Male     Other Topics Concern      Service No     Blood Transfusions No     Exercise Yes     curves     Seat Belt Yes     Self-Exams Yes     Parent/Sibling W/ Cabg, Mi Or Angioplasty Before 65f 55m? No     Social History Narrative    December 7, 2009    Balanced Diet - Yes    Osteoporosis Prevention Measures - Dairy servings per day: 2 and Medication/Supplements (See current meds)    Regular Exercise -  Yes Describe curves, walking    Dental Exam - YES - Date: 10/2009    Eye Exam - YES - Date: 2008    Self Breast Exam - Yes    Abuse: Current or Past (Physical, Sexual or Emotional)- No    Do you feel safe in your environment - Yes    Guns stored in the home - No    Sunscreen used - Yes    Seatbelts used - Yes    Lipids -  YES - Date: 11/24/2009    Glucose -  YES - Date: 11/24/2009    Colon Cancer Screening - Colonoscopy 11/18/2005(date completed)    Hemoccults - YES - Date: 10/12/2004    Pap Test -  YES - Date: 09/22/2004    Do you have any concerns about STD's -  No    Mammography - YES - Date: 11/24/2009    DEXA - YES - Date: 11/20/2008    Immunizations reviewed and up to date - Yes    PAULETTE Spencer CMA                 Allergies   Allergen Reactions     No Known Drug Allergies      Patient Active Problem List   Diagnosis     Esophageal reflux     restless leg     heat intolerance     Goiter     Disorder of bone and cartilage     Other psoriasis     perirectal cyst     Malignant melanoma of skin of trunk, except scrotum (H)     Undiagnosed cardiac murmurs     Nontoxic multinodular goiter     Hyperlipidemia LDL goal <130     Hypertension goal BP (blood pressure) < 140/90     Urinary incontinence     Osteoarthritis of left knee     Hip arthritis     Polymyalgia rheumatica (H)     High risk medication use     Shoulder pain     Sepsis (H)     Impaired fasting blood sugar     Chronic bilateral low back  pain without sciatica     Obesity, unspecified obesity severity, unspecified obesity type     Iron deficiency anemia, unspecified iron deficiency anemia type     NSTEMI (non-ST elevated myocardial infarction) (H)     Stress-induced cardiomyopathy     Reviewed medications, social history and  past medical and surgical history.    Review of system: for general, respiratory, CVS, GI and psychiatry negative except for noted above.     EXAM:  /62 (BP Location: Left arm, Patient Position: Sitting, Cuff Size: Adult Regular)  Pulse 73  Temp 97.4  F (36.3  C) (Oral)  Resp 16  Wt 163 lb (73.9 kg)  SpO2 98%  BMI 35.27 kg/m2  Constitutional: healthy, alert and no distress   Psychiatric: mentation appears normal and affect normal/bright  Cardiovascular: RRR. No murmurs,  Respiratory: negative, Lungs clear. No crackles or wheezing. No tachypnea.        ASSESSMENT / PLAN:   (I50.23) Acute on chronic systolic congestive heart failure (H)  (primary encounter diagnosis)  Comment: Reviewed CORE clinics notes.   Plan: She is on Spironolactone.  So far she is tolerating it well without any major side effect.  Her weight is up compared to the yesterday's note but I assume it may be from different scales.  She is going to come back in a month for lab only to recheck her electrolytes.    (I21.4) NSTEMI (non-ST elevated myocardial infarction) (H)  Comment:    Plan: Recovering well.  Had coronary angiogram.    (E04.2) Nontoxic multinodular goiter  Comment:    Plan: Seeing endocrine.  Having some symptoms of hyperthyroidism.  Had a thyroid ultrasound recently and going to see endocrine next week for follow-up    (I10) Hypertension goal BP (blood pressure) < 140/90  Comment:    Plan: See above    Follow-up with me in 3 months

## 2017-12-20 NOTE — MR AVS SNAPSHOT
After Visit Summary   12/20/2017    Dimple Oviedo    MRN: [id number]           Patient Information     Date Of Birth          6/23/1933        Visit Information        Provider Department      12/20/2017 1:40 PM Pop Zapien MD [facility]        Today's Diagnoses     Acute on chronic systolic congestive heart failure (H)    -  1    NSTEMI (non-ST elevated myocardial infarction) (H)        Nontoxic multinodular goiter        Hypertension goal BP (blood pressure) < 140/90           Follow-ups after your visit        Your next 10 appointments already scheduled     Jan 02, 2018  1:00 PM CST   LAB with  LAB   [facility] ([facility])    [address] [place] [id number] [phone]           Please do not eat 10-12 hours before your appointment if you are coming in fasting for labs on lipids, cholesterol, or glucose (sugar). This does not apply to pregnant women. Water, hot tea and black coffee (with nothing added) are okay. Do not drink other fluids, diet soda or chew gum.            Jan 24, 2018  1:30 PM CST   Lab with  LAB   [facility] ([facility])    [address] [address] [phone]            Jan 24, 2018  2:00 PM CST   (Arrive by 1:45 PM)   CORE RETURN with Lynn Cyr NP   [facility] ([facility])    [address] [facility]  [address] [facility] [address] [phone]              Future tests that were ordered for you today     Open Future Orders        Priority Expected Expires Ordered    Basic metabolic panel Routine  12/19/2018 12/19/2017            Who to contact     If you have questions or need follow up information about today's clinic visit or your schedule please contact [facility] directly at [phone].  Normal or non-critical lab and imaging results will be communicated to you by  MyChart, letter or phone within 4 business days after the clinic has received the results. If you do not hear from us within 7 days, please contact the clinic through Wero[employer] or phone. If you have a critical or abnormal lab result, we will notify you by phone as soon as possible.  Submit refill requests through W[employer]om or call your pharmacy and they will forward the refill request to us. Please allow 3 business days for your refill to be completed.          Additional Information About Your Visit        [employer] Information     [employer] gives you secure access to your electronic health record. If you see a primary care provider, you can also send messages to your care team and make appointments. If you have questions, please call your primary care clinic.  If you do not have a primary care provider, please call [phone] and they will assist you.        Care EveryWhere ID     This is your Care EveryWhere ID. This could be used by other organizations to access your [facility] medical records  [id number]        Your Vitals Were     Pulse Temperature Respirations Pulse Oximetry BMI (Body Mass Index)       73 97.4  F (36.3  C) (Oral) 16 98% 35.27 kg/m2        Blood Pressure from Last 3 Encounters:   12/20/17 124/62   12/19/17 133/72   12/14/17 128/53    Weight from Last 3 Encounters:   12/20/17 163 lb (73.9 kg)   12/19/17 157 lb 12.8 oz (71.6 kg)   12/14/17 159 lb 3.2 oz (72.2 kg)              Today, you had the following     No orders found for display         Today's Medication Changes          These changes are accurate as of: 12/20/17  2:33 PM.  If you have any questions, ask your nurse or doctor.               These medicines have changed or have updated prescriptions.        Dose/Directions    aspirin 81 MG tablet   This may have changed:  See the new instructions.   Used for:  Routine medical exam        ONE DAILY   Quantity:  100   Refills:  3                Primary Care Provider Office Phone # Fax #    Pop  "Antonino Zapien [phone] [address] [place], [place] [id number]        Equal Access to Services     GUNNER RODRIGUEZ : Hadii aad ku haddarcyjacquie Pollack, costacristian dooleytrungha, yassinebeny ruizgus chávez, maldonado molina lamaryabdias sotero. So [facility] [phone].    ATENCIÓN: Si habla español, tiene a sierra disposición servicios gratuitos de asistencia lingüística. Llame al [phone].    We comply with applicable federal civil rights laws and [place] laws. We do not discriminate on the basis of race, color, national origin, age, disability, sex, sexual orientation, or gender identity.            Thank you!     Thank you for choosing [facility]  for your care. Our goal is always to provide you with excellent care. Hearing back from our patients is one way we can continue to improve our services. Please take a few minutes to complete the written survey that you may receive in the mail after your visit with us. Thank you!             Your Updated Medication List - Protect others around you: Learn how to safely use, store and throw away your medicines at www.disposemymeds.org.          This list is accurate as of: 12/20/17  2:33 PM.  Always use your most recent med list.                   Brand Name Dispense Instructions for use Diagnosis    acetaminophen 650 MG 8 hour tablet     100 tablet    Take 650 mg by mouth every 8 hours as needed for mild pain or fever    Ascending cholangitis       alendronate 70 MG tablet    FOSAMAX    12 tablet    TAKE 1 TABLET EVERY 7 DAYS AT LEAST 60 MIN BEFORE BREAKFAST AS DIRECTED \"SEE PACKAGE FOR ADDITIONAL INSTRUCTIONS\"    High risk medication use, Osteopenia       aspirin 81 MG tablet     100    ONE DAILY    Routine medical exam       CALCIUM 500 + D 500-200 MG-IU Tabs      1 tablets 3 times daily        CRANBERRY CONCENTRATE PO      Take 1 capsule by mouth 2 times daily        Fish Oil 500 MG Caps      Take 1 capsule by mouth 3 times daily        " furosemide 20 MG tablet    LASIX    30 tablet    Take 1 tablet (20 mg) by mouth daily    Elevated troponin       irbesartan 300 MG tablet    AVAPRO    90 tablet    TAKE 1 TABLET EVERY DAY    Essential hypertension with goal blood pressure less than 140/90       lovastatin 40 MG tablet    MEVACOR    90 tablet    TAKE 1 TABLET AT BEDTIME (HYPERLIPIDEMIA LDL GOAL  BELOW 130)    Hyperlipidemia LDL goal <130       melatonin 3 MG tablet     90 tablet    Take 1 tablet (3 mg) by mouth nightly as needed for sleep    Insomnia       nitroGLYcerin 0.4 MG sublingual tablet    NITROSTAT    25 tablet    For chest pain place 1 tablet under the tongue every 5 minutes for 3 doses. If symptoms persist 5 minutes after 1st dose call 911.    Elevated troponin       ofloxacin 0.3 % ophthalmic solution    OCUFLOX     Place 1 drop into the right eye 4 times daily        omeprazole 20 MG CR capsule    priLOSEC    180 capsule    Take 1 capsule (20 mg) by mouth daily    Gastroesophageal reflux disease without esophagitis       polyethylene glycol Packet    MIRALAX/GLYCOLAX    7 packet    Take 17 g by mouth 2 times daily as needed for constipation    Other constipation       prednisoLONE acetate 1 % ophthalmic susp    PRED FORTE     Place 1 drop into the right eye 4 times daily Taper as directed        propranolol 60 MG 24 hr capsule    INDERAL LA     Take 60 mg by mouth daily        RESTASIS 0.05 % ophthalmic emulsion   Generic drug:  cycloSPORINE      Place 1 drop into both eyes 2 times daily        spironolactone 25 MG tablet    ALDACTONE    30 tablet    Take 0.5 tablets (12.5 mg) by mouth daily    Acute on chronic systolic congestive heart failure (H)       tobramycin-dexamethasone 0.3-0.1 % ophthalmic susp    TOBRADEX     Place 1 drop into the right eye 4 times daily

## 2017-12-20 NOTE — DISCHARGE SUMMARY
[facility]  [address] [facility] [address]  p: [phone]    Discharge Summary: Cardiology Service    Dimple Valdez MRN# [id number]   YOB: 1933 Age: 84 year old       Admission Date: 2017  Discharge Date: 17      Discharge Diagnoses:  # Stress induced Cardiomyopathy  # Acute on Chronic HFrEF  # SOB   # FVO  # HTN  # Hyperthyroidism  # HLD    Pertinent Procedures:  1. Coronary angiogram  2. Chest Xray  3. CT  4. Echocardiogram    Consults:  Inpatient CORE    Imaging with results:  Echocardiogram   Recent Results (from the past 4320 hour(s))   ECHO COMPLETE WITH OPTISON    Narrative    [id number]  [id number]  [id number]  [id number]^RETA^RICK^LISANDRA           [facility],[place]  Echocardiography Laboratory  [address] [place] [address]     Name: DIMPLE VALDEZ  MRN: [id number]  : 1933  Study Date: 2017 09:39 AM  Age: 84 yrs  Gender: Female  Patient Location: [facility]  Reason For Study: Dyspnea  Ordering Physician: RICK MCDUFFIE  Performed By: [facility] Destinee See     BSA: 1.7 m2  Height: 58 in  Weight: 176 lb  BP: 131/85 mmHg  _____________________________________________________________________________  __        Procedure  Echocardiogram with two-dimensional, color and spectral Doppler performed.  Contrast Optison. Optison (NDC #4533-4316-80) given intravenously. Patient was  given 6.0 ml mixture of 3 ml Optison and 6 ml saline. 3.0 ml wasted.  _____________________________________________________________________________  __        Interpretation Summary  Technically difficult study due to poor acoustic windows.     Normal left ventricular size with mild concentric hypertrophy.  Global akinesis with sparing of the basal segments consistent with stress  cardiomyopathy versus multivessel coronary artery disease. Moderately reduced  systolic function. EF 30-35%.  Normal right ventricular size and  function.  Severe left atrial enlargement.  No pericardial effusion.     Compared to the prior study 10/31/15 the wall motion abnormalities and LV  dysfunction are new.  _____________________________________________________________________________  __        Left Ventricle  Left ventricular size is normal. Mild concentric wall thickening consistent  with left ventricular hypertrophy is present. Moderately (EF 30-35%) reduced  left ventricular function is present. Global akinesis with sparing of the  basal segments consistent with stress cardiomyopathy versus multivessel  coronary artery disease.     Right Ventricle  The right ventricle is normal size. Global right ventricular function is  normal.     Atria  The right atria appears normal. Severe left atrial enlargement is present.        Mitral Valve  The mitral valve is normal. Trace mitral insufficiency is present.     Aortic Valve  Moderate aortic valve calcification. Mild aortic insufficiency is present.  Mild aortic stenosis is present.     Tricuspid Valve  The tricuspid valve is normal. Trace tricuspid insufficiency is present. The  peak velocity of the tricuspid regurgitant jet is not obtainable.     Pulmonic Valve  The pulmonic valve is normal. Trace pulmonic insufficiency is present.     Vessels  The aorta root is normal. The inferior vena cava was normal in size with  preserved respiratory variability. Estimated mean right atrial pressure is 3  mmHg.     Pericardium  No pericardial effusion is present.        Attestation  I have personally viewed the imaging and agree with the interpretation and  report as documented by the fellow, Chencho Chanel, and/or edited by me.  _____________________________________________________________________________  __     MMode/2D Measurements & Calculations  IVSd: 1.3 cm  LVIDd: 4.7 cm  LVIDs: 3.4 cm  LVPWd: 1.2 cm  FS: 27.4 %  EDV(Teich): 101.3 ml  ESV(Teich): 47.4 ml  LV mass(C)d: 219.4 grams  LV mass(C)dI: 127.2  grams/m2  Ao root diam: 2.5 cm  LVOT diam: 2.2 cm  LVOT area: 3.8 cm2     EF(MOD-bp): 31.6 %  LA Volume (BP): 88.7 ml  LA Volume Index (BP): 51.6 ml/m2     RWT: 0.51        Doppler Measurements & Calculations  MV E max tati: 127.8 cm/sec  Ao V2 max: 212.3 cm/sec  Ao max P.0 mmHg  Ao V2 mean: 147.3 cm/sec  Ao mean PG: 10.0 mmHg  Ao V2 VTI: 40.1 cm  PAUL(I,D): 1.7 cm2  PAUL(V,D): 1.7 cm2  LV V1 max PG: 3.6 mmHg  LV V1 max: 95.3 cm/sec  LV V1 VTI: 18.2 cm  SV(LVOT): 69.2 ml  SI(LVOT): 40.1 ml/m2  PAUL Index (cm2/m2): 1.0  E/E' av.7  Lateral E/e': 25.6  Medial E/e': 23.8        _____________________________________________________________________________  __        Report approved by: Ade Razo 2017 10:42 AM          Coronary Angiogram  No obstructive disease    Brief HPI:  Dimple Oviedo is an 84 year old female with PMHx of HLD, HTN, multinodular goiter, GERD, Hyperthyroidism who presents to [facility] with chest pain and SOB, found to have elevated troponin.        Hospital Course by Diagnosis:    # Likely Stress Cardiomyopathy. Chest pain with elevated Troponins. No ischemic EKG changes. Echocardiogram reveals reduced EF, 30%, septal thickening and global hypokinesis. CT negative for PE but with bilateral Pl. Effusions. Cor angio revealed no obstructive disease. Clinical picture consistent with stress cardiomyopathy. Patient is already on a BB, ARB, Statin and ASA, which we will continue. On discharge, patient is chest pain free, walking the unit without complaints.   -- Follow up CORE clinic/PCP in one week for follow up and reassess volume status/necessity for continuation or discontinuation.      # SOB   # FVO  -- Gentle diureses until CORE/PCP follow up. Afebrile. No leukocytosis.   CT negative for PE but with bilateral Pl. effusions. Lasix as above, LHC, as above, with frequent reassessment.      # HTN- Currently well controlled  - Continue Irbesartan, as PTA as tolerated  -  Patient prefers her  current non selective BB she is on for hyperthyroidism instead of Metop.     # Hyperthyroidism-   -- Continue BB as BP tolerates.      # HLD  - Continue PTA statin      Condition on discharge     General: Alert, interactive, NAD  Eyes: sclera anicteric, EOMI  Neck: carotid 2+ bilaterally  Cardiovascular: regular rate and rhythm, normal S1 and S2, no murmurs, gallops, or rubs  Resp: clear to auscultation bilaterally, no rales, wheezes, or rhonchi  GI: Soft, nontender, nondistended. +BS.  No HSM or masses, no rebound or guarding.  Extremities: Trace edema, no cyanosis or clubbing, dorsalis pedis and posterior tibialis pulses 2+ bilaterally  Skin: Warm and dry, no jaundice or rash  Neuro: CN 2-12 intact, moves all extremities equally  Psych: Alert & oriented x 3      Medication Changes:  Add lasix until PCP/CORE follow up to reassess volume status/necessity for continuation or discontinuation.     Discharge medications:   Discharge Medication List as of 12/14/2017  1:00 PM      START taking these medications    Details   nitroGLYcerin (NITROSTAT) 0.4 MG sublingual tablet For chest pain place 1 tablet under the tongue every 5 minutes for 3 doses. If symptoms persist 5 minutes after 1st dose call 911., Disp-25 tablet, R-3, E-Prescribe      furosemide (LASIX) 20 MG tablet Take 1 tablet (20 mg) by mouth daily, Disp-30 tablet, R-0, E-PrescribePlease take one pill per day until follow with PCP next week for lab checks         CONTINUE these medications which have NOT CHANGED    Details   propranolol (INDERAL LA) 60 MG 24 hr capsule Take 60 mg by mouth daily, Historical      ofloxacin (OCUFLOX) 0.3 % ophthalmic solution Place 1 drop into the right eye 4 times daily, Historical      tobramycin-dexamethasone (TOBRADEX) 0.3-0.1 % ophthalmic susp Place 1 drop into the right eye 4 times daily, Historical      CRANBERRY CONCENTRATE PO Take 1 capsule by mouth 2 times daily, Historical      lovastatin (MEVACOR) 40 MG tablet TAKE 1  "TABLET AT BEDTIME (HYPERLIPIDEMIA LDL GOAL  BELOW 130), Disp-90 tablet, R-2, E-Prescribe      omeprazole (PRILOSEC) 20 MG CR capsule Take 1 capsule (20 mg) by mouth daily, Disp-180 capsule, R-3, E-Prescribe      traMADol (ULTRAM) 50 MG tablet Take 1-2 tablets ( mg) by mouth every 8 hours as needed for pain maximum 2 tablet(s) per day, Disp-30 tablet, R-0, Local Print      alendronate (FOSAMAX) 70 MG tablet TAKE 1 TABLET EVERY 7 DAYS AT LEAST 60 MIN BEFORE BREAKFAST AS DIRECTED \"SEE PACKAGE FOR ADDITIONAL INSTRUCTIONS\", Disp-12 tablet, R-2, E-Prescribe      irbesartan (AVAPRO) 300 MG tablet TAKE 1 TABLET EVERY DAY, Disp-90 tablet, R-0, E-Prescribe      acetaminophen 650 MG TABS Take 650 mg by mouth every 8 hours as needed for mild pain or fever, Disp-100 tablet, R-0, E-Prescribe      polyethylene glycol (MIRALAX/GLYCOLAX) packet Take 17 g by mouth 2 times daily as needed for constipation, Disp-7 packet, R-0, E-Prescribe      Omega-3 Fatty Acids (FISH OIL) 500 MG CAPS Take 1 capsule by mouth 3 times daily, Historical      prednisoLONE acetate (PRED FORTE) 1 % ophthalmic suspension Place 1 drop into the right eye 4 times daily Taper as directed, Historical      melatonin 3 MG tablet Take 1 tablet (3 mg) by mouth nightly as needed for sleep, Disp-90 tablet, R-0, E-Prescribe      cycloSPORINE (RESTASIS) 0.05 % ophthalmic emulsion Place 1 drop into both eyes 2 times daily, Historical      ASPIRIN 81 MG OR TABS ONE DAILY, Disp-100, R-3, Fax      CALCIUM 500 + D 500-200 MG-IU OR TABS 1 tablets 3 times daily, R-0, Historical         STOP taking these medications       IBUPROFEN PO Comments:   Reason for Stopping:               Labs or imaging requiring follow-up after discharge:  Per PCP      Follow-up:  PCP in one week, CORE clinic next week    NELSON Echeverria, CNP  [facility]  Interventional Cardiology-CSI Service  Pager [phone]       Patient Care Team:  Pop Zapien " MD Antonino as PCP - General (Family Practice)  Vincenzo Tovar MD as MD (Family Medicine - Sports Medicine)  Candelaria Raymundo MD as MD (Gastroenterology)

## 2017-12-20 NOTE — NURSING NOTE
"Chief Complaint   Patient presents with     Hospital F/U       Initial /62 (BP Location: Left arm, Patient Position: Sitting, Cuff Size: Adult Regular)  Pulse 73  Temp 97.4  F (36.3  C) (Oral)  Resp 16  Wt 163 lb (73.9 kg)  SpO2 98%  BMI 35.27 kg/m2 Estimated body mass index is 35.27 kg/(m^2) as calculated from the following:    Height as of 12/19/17: 4' 9\" (1.448 m).    Weight as of this encounter: 163 lb (73.9 kg).  Medication Reconciliation: complete     Nichole Zurita MA      "

## 2017-12-29 ENCOUNTER — TRANSFERRED RECORDS (OUTPATIENT)
Dept: HEALTH INFORMATION MANAGEMENT | Facility: CLINIC | Age: 82
End: 2017-12-29

## 2017-12-29 LAB — TSH SERPL-ACNC: <0 UIU/ML (ref 0.3–5)

## 2018-01-02 ENCOUNTER — TELEPHONE (OUTPATIENT)
Dept: FAMILY MEDICINE | Facility: CLINIC | Age: 83
End: 2018-01-02

## 2018-01-02 ENCOUNTER — OFFICE VISIT (OUTPATIENT)
Dept: URGENT CARE | Facility: URGENT CARE | Age: 83
End: 2018-01-02
Payer: MEDICARE

## 2018-01-02 VITALS
HEART RATE: 60 BPM | OXYGEN SATURATION: 98 % | TEMPERATURE: 98.5 F | BODY MASS INDEX: 35.27 KG/M2 | SYSTOLIC BLOOD PRESSURE: 105 MMHG | DIASTOLIC BLOOD PRESSURE: 55 MMHG | WEIGHT: 163 LBS

## 2018-01-02 DIAGNOSIS — I50.23 ACUTE ON CHRONIC SYSTOLIC CONGESTIVE HEART FAILURE (H): ICD-10-CM

## 2018-01-02 DIAGNOSIS — R31.0 GROSS HEMATURIA: ICD-10-CM

## 2018-01-02 DIAGNOSIS — N30.91 HEMORRHAGIC CYSTITIS: Primary | ICD-10-CM

## 2018-01-02 LAB
ALBUMIN UR-MCNC: 30 MG/DL
APPEARANCE UR: CLEAR
BACTERIA #/AREA URNS HPF: ABNORMAL /HPF
BILIRUB UR QL STRIP: NEGATIVE
COLOR UR AUTO: YELLOW
GLUCOSE UR STRIP-MCNC: NEGATIVE MG/DL
HGB UR QL STRIP: ABNORMAL
KETONES UR STRIP-MCNC: NEGATIVE MG/DL
LEUKOCYTE ESTERASE UR QL STRIP: ABNORMAL
NITRATE UR QL: NEGATIVE
PH UR STRIP: 6 PH (ref 5–7)
RBC #/AREA URNS AUTO: >100 /HPF
SOURCE: ABNORMAL
SP GR UR STRIP: <=1.005 (ref 1–1.03)
UROBILINOGEN UR STRIP-ACNC: 0.2 EU/DL (ref 0.2–1)
WBC #/AREA URNS AUTO: ABNORMAL /HPF

## 2018-01-02 PROCEDURE — 99213 OFFICE O/P EST LOW 20 MIN: CPT | Performed by: FAMILY MEDICINE

## 2018-01-02 PROCEDURE — 80048 BASIC METABOLIC PNL TOTAL CA: CPT | Performed by: NURSE PRACTITIONER

## 2018-01-02 PROCEDURE — 81001 URINALYSIS AUTO W/SCOPE: CPT | Performed by: FAMILY MEDICINE

## 2018-01-02 PROCEDURE — 36415 COLL VENOUS BLD VENIPUNCTURE: CPT | Performed by: NURSE PRACTITIONER

## 2018-01-02 RX ORDER — CEPHALEXIN 500 MG/1
500 CAPSULE ORAL 2 TIMES DAILY
Qty: 14 CAPSULE | Refills: 0 | Status: SHIPPED | OUTPATIENT
Start: 2018-01-02 | End: 2018-01-09

## 2018-01-02 NOTE — MR AVS SNAPSHOT
After Visit Summary   1/2/2018    Dimple Oviedo    MRN: [id number]           Patient Information     Date Of Birth          6/23/1933        Visit Information        Provider Department      1/2/2018 1:30 PM Ambar Tyler MD [facility] Urgent [facility]        Today's Diagnoses     Hemorrhagic cystitis    -  1    Gross hematuria           Follow-ups after your visit        Your next 10 appointments already scheduled     Jan 24, 2018  1:30 PM CST   Lab with  LAB   [facility] ([facility])    [address] [place] [address] [phone]            Jan 24, 2018  2:00 PM CST   (Arrive by 1:45 PM)   CORE RETURN with Lynn Cyr NP   [facility] ([facility])    [address] [place] [address] [phone]              Who to contact     If you have questions or need follow up information about today's clinic visit or your schedule please contact [facility] directly at [phone].  Normal or non-critical lab and imaging results will be communicated to you by [employer], letter or phone within 4 business days after the clinic has received the results. If you do not hear from us within 7 days, please contact the clinic through [employer] or phone. If you have a critical or abnormal lab result, we will notify you by phone as soon as possible.  Submit refill requests through [employer] or call your pharmacy and they will forward the refill request to us. Please allow 3 business days for your refill to be completed.          Additional Information About Your Visit        [employer] Information     [employer] gives you secure access to your electronic health record. If you see a primary care provider, you can also send messages to your care team and make appointments. If you have questions, please call your primary care clinic.  If you do not have a primary care provider, please call  [phone] and they will assist you.        Care EveryWhere ID     This is your Care EveryWhere ID. This could be used by other organizations to access your [place] medical records  [id number]        Your Vitals Were     Pulse Temperature Pulse Oximetry BMI (Body Mass Index)          60 98.5  F (36.9  C) (Tympanic) 98% 35.27 kg/m2         Blood Pressure from Last 3 Encounters:   01/02/18 105/55   12/20/17 124/62   12/19/17 133/72    Weight from Last 3 Encounters:   01/02/18 163 lb (73.9 kg)   12/20/17 163 lb (73.9 kg)   12/19/17 157 lb 12.8 oz (71.6 kg)              We Performed the Following     UA reflex to Microscopic and Culture     Urine Microscopic          Today's Medication Changes          These changes are accurate as of: 1/2/18  6:16 PM.  If you have any questions, ask your nurse or doctor.               Start taking these medicines.        Dose/Directions    cephALEXin 500 MG capsule   Commonly known as:  KEFLEX   Used for:  Hemorrhagic cystitis, Gross hematuria   Started by:  Ambar Tyler MD        Dose:  500 mg   Take 1 capsule (500 mg) by mouth 2 times daily for 7 days   Quantity:  14 capsule   Refills:  0         These medicines have changed or have updated prescriptions.        Dose/Directions    aspirin 81 MG tablet   This may have changed:  See the new instructions.   Used for:  Routine medical exam        ONE DAILY   Quantity:  100   Refills:  3            Where to get your medicines      These medications were sent to [facility] [address] [place] [address] [address] [place] [address] [address], [facility] [address]    Hours:  24-hours Phone:  [phone]     cephALEXin 500 MG capsule                Primary Care Provider Office Phone # Fax #    Pop Antonino Zapien [phone] [address] [place] [address]        Equal Access to Services     GUNNER RODRIGUEZ AH: nadia Schneider qaybta  "maldonado lund. So [facility] [phone].    ATENCIÓN: Si soni alanis, tiene a sierra disposición servicios gratuitos de asistencia lingüística. Al al [phone].    We comply with applicable federal civil rights laws and [place] laws. We do not discriminate on the basis of race, color, national origin, age, disability, sex, sexual orientation, or gender identity.            Thank you!     Thank you for choosing [facility]  for your care. Our goal is always to provide you with excellent care. Hearing back from our patients is one way we can continue to improve our services. Please take a few minutes to complete the written survey that you may receive in the mail after your visit with us. Thank you!             Your Updated Medication List - Protect others around you: Learn how to safely use, store and throw away your medicines at www.disposemymeds.org.          This list is accurate as of: 1/2/18  6:16 PM.  Always use your most recent med list.                   Brand Name Dispense Instructions for use Diagnosis    acetaminophen 650 MG 8 hour tablet     100 tablet    Take 650 mg by mouth every 8 hours as needed for mild pain or fever    Ascending cholangitis       alendronate 70 MG tablet    FOSAMAX    12 tablet    TAKE 1 TABLET EVERY 7 DAYS AT LEAST 60 MIN BEFORE BREAKFAST AS DIRECTED \"SEE PACKAGE FOR ADDITIONAL INSTRUCTIONS\"    High risk medication use, Osteopenia       aspirin 81 MG tablet     100    ONE DAILY    Routine medical exam       CALCIUM 500 + D 500-200 MG-IU Tabs      1 tablets 3 times daily        cephALEXin 500 MG capsule    KEFLEX    14 capsule    Take 1 capsule (500 mg) by mouth 2 times daily for 7 days    Hemorrhagic cystitis, Gross hematuria       CRANBERRY CONCENTRATE PO      Take 1 capsule by mouth 2 times daily        Fish Oil 500 MG Caps      Take 1 capsule by mouth 3 times daily        furosemide 20 MG tablet    LASIX    30 " tablet    Take 1 tablet (20 mg) by mouth daily    Elevated troponin       irbesartan 300 MG tablet    AVAPRO    90 tablet    TAKE 1 TABLET EVERY DAY    Essential hypertension with goal blood pressure less than 140/90       lovastatin 40 MG tablet    MEVACOR    90 tablet    TAKE 1 TABLET AT BEDTIME (HYPERLIPIDEMIA LDL GOAL  BELOW 130)    Hyperlipidemia LDL goal <130       melatonin 3 MG tablet     90 tablet    Take 1 tablet (3 mg) by mouth nightly as needed for sleep    Insomnia       nitroGLYcerin 0.4 MG sublingual tablet    NITROSTAT    25 tablet    For chest pain place 1 tablet under the tongue every 5 minutes for 3 doses. If symptoms persist 5 minutes after 1st dose call 911.    Elevated troponin       ofloxacin 0.3 % ophthalmic solution    OCUFLOX     Place 1 drop into the right eye 4 times daily        omeprazole 20 MG CR capsule    priLOSEC    180 capsule    Take 1 capsule (20 mg) by mouth daily    Gastroesophageal reflux disease without esophagitis       polyethylene glycol Packet    MIRALAX/GLYCOLAX    7 packet    Take 17 g by mouth 2 times daily as needed for constipation    Other constipation       prednisoLONE acetate 1 % ophthalmic susp    PRED FORTE     Place 1 drop into the right eye 4 times daily Taper as directed        propranolol 60 MG 24 hr capsule    INDERAL LA     Take 60 mg by mouth daily        RESTASIS 0.05 % ophthalmic emulsion   Generic drug:  cycloSPORINE      Place 1 drop into both eyes 2 times daily        spironolactone 25 MG tablet    ALDACTONE    30 tablet    Take 0.5 tablets (12.5 mg) by mouth daily    Acute on chronic systolic congestive heart failure (H)       tobramycin-dexamethasone 0.3-0.1 % ophthalmic susp    TOBRADEX     Place 1 drop into the right eye 4 times daily

## 2018-01-02 NOTE — NURSING NOTE
"Chief Complaint   Patient presents with     Urgent Care     Urinary Problem     Pt states has had blood in urine x 2 days.       Initial /55 (BP Location: Right arm, Patient Position: Chair, Cuff Size: Adult Large)  Pulse 60  Temp 98.5  F (36.9  C) (Tympanic)  Wt 163 lb (73.9 kg)  SpO2 98%  BMI 35.27 kg/m2 Estimated body mass index is 35.27 kg/(m^2) as calculated from the following:    Height as of 12/19/17: 4' 9\" (1.448 m).    Weight as of this encounter: 163 lb (73.9 kg).  Medication Reconciliation: unable or not appropriate to perform   Moody Guerrero CMA (AAMA) 1/2/2018 1:32 PM    "

## 2018-01-02 NOTE — PROGRESS NOTES
SUBJECTIVE:   Dimple Oviedo is a 84 year old female who  presents today for a possible UTI. Symptoms of blood in urine have been going on for 2day(s).  sudden onset and still presentand moderate.  There is no history of fever, chills, nausea or vomiting.  No history of vaginal discharge. This patient does have a history of urinary tract infections.  Was recently hospitalized with NSTEMI and had cardiac cath done.  Patient denies long duration, rigors and flank pain.    Past Medical History:   Diagnosis Date     Calculus of kidney      Esophageal reflux      Hyperlipidemia LDL goal <130 5/9/2010     Malignant melanoma of skin of trunk, except scrotum (H)      Nonspecific abnormal finding     has living will 2004 -      Nontoxic multinodular goiter     no further eval /tx rec per pt     Osteopenia      Other psoriasis      Personal history of colonic polyps      PMR (polymyalgia rheumatica) (H)      Undiagnosed cardiac murmurs      Unspecified constipation      Unspecified essential hypertension      Current Outpatient Prescriptions   Medication Sig Dispense Refill     spironolactone (ALDACTONE) 25 MG tablet Take 0.5 tablets (12.5 mg) by mouth daily 30 tablet 1     nitroGLYcerin (NITROSTAT) 0.4 MG sublingual tablet For chest pain place 1 tablet under the tongue every 5 minutes for 3 doses. If symptoms persist 5 minutes after 1st dose call 911. 25 tablet 3     furosemide (LASIX) 20 MG tablet Take 1 tablet (20 mg) by mouth daily 30 tablet 0     propranolol (INDERAL LA) 60 MG 24 hr capsule Take 60 mg by mouth daily       ofloxacin (OCUFLOX) 0.3 % ophthalmic solution Place 1 drop into the right eye 4 times daily       tobramycin-dexamethasone (TOBRADEX) 0.3-0.1 % ophthalmic susp Place 1 drop into the right eye 4 times daily       CRANBERRY CONCENTRATE PO Take 1 capsule by mouth 2 times daily       lovastatin (MEVACOR) 40 MG tablet TAKE 1 TABLET AT BEDTIME (HYPERLIPIDEMIA LDL GOAL  BELOW 130) 90 tablet 2     omeprazole  "(PRILOSEC) 20 MG CR capsule Take 1 capsule (20 mg) by mouth daily 180 capsule 3     alendronate (FOSAMAX) 70 MG tablet TAKE 1 TABLET EVERY 7 DAYS AT LEAST 60 MIN BEFORE BREAKFAST AS DIRECTED \"SEE PACKAGE FOR ADDITIONAL INSTRUCTIONS\" 12 tablet 2     irbesartan (AVAPRO) 300 MG tablet TAKE 1 TABLET EVERY DAY 90 tablet 0     polyethylene glycol (MIRALAX/GLYCOLAX) packet Take 17 g by mouth 2 times daily as needed for constipation 7 packet 0     Omega-3 Fatty Acids (FISH OIL) 500 MG CAPS Take 1 capsule by mouth 3 times daily       prednisoLONE acetate (PRED FORTE) 1 % ophthalmic suspension Place 1 drop into the right eye 4 times daily Taper as directed       melatonin 3 MG tablet Take 1 tablet (3 mg) by mouth nightly as needed for sleep 90 tablet 0     ASPIRIN 81 MG OR TABS ONE DAILY (Patient taking differently: ONE DAILY at bedtime) 100 3     CALCIUM 500 + D 500-200 MG-IU OR TABS 1 tablets 3 times daily  0     acetaminophen 650 MG TABS Take 650 mg by mouth every 8 hours as needed for mild pain or fever 100 tablet 0     cycloSPORINE (RESTASIS) 0.05 % ophthalmic emulsion Place 1 drop into both eyes 2 times daily       Social History   Substance Use Topics     Smoking status: Never Smoker     Smokeless tobacco: Never Used     Alcohol use No      Comment: 6 times per year-one drink       ROS:   No diarrhea.  Has been urinating more frequently due to diuretic started after her hospitalization.  No rashes.    OBJECTIVE:  /55 (BP Location: Right arm, Patient Position: Chair, Cuff Size: Adult Large)  Pulse 60  Temp 98.5  F (36.9  C) (Tympanic)  Wt 163 lb (73.9 kg)  SpO2 98%  BMI 35.27 kg/m2  GENERAL APPEARANCE: healthy, alert and no distress  RESP: lungs clear to auscultation - no rales, rhonchi or wheezes  CV: regular rate and rhythm, normal S1 S2, no murmur noted  ABDOMEN:  soft, nontender, no HSM or masses and bowel sounds normal  BACK: No CVA tenderness  SKIN: no suspicious lesions or rashes    Results for orders " placed or performed in visit on 01/02/18   UA reflex to Microscopic and Culture   Result Value Ref Range    Color Urine Yellow     Appearance Urine Clear     Glucose Urine Negative NEG^Negative mg/dL    Bilirubin Urine Negative NEG^Negative    Ketones Urine Negative NEG^Negative mg/dL    Specific Gravity Urine <=1.005 1.003 - 1.035    Blood Urine Large (A) NEG^Negative    pH Urine 6.0 5.0 - 7.0 pH    Protein Albumin Urine 30 (A) NEG^Negative mg/dL    Urobilinogen Urine 0.2 0.2 - 1.0 EU/dL    Nitrite Urine Negative NEG^Negative    Leukocyte Esterase Urine Trace (A) NEG^Negative    Source Midstream Urine    Urine Microscopic   Result Value Ref Range    WBC Urine 5-10 (A) OTO2^O - 2 /HPF    RBC Urine >100 (A) OTO2^O - 2 /HPF    Bacteria Urine Few (A) NEG^Negative /HPF         ASSESSMENT:   Acute hemorrhagic cystitis    PLAN:  Cephalexin x 7 days as per ordered above  Drink plenty of fluids.  Signs and symptoms of pyelonephritis mentioned.  Follow up with primary care physician if not improving

## 2018-01-02 NOTE — TELEPHONE ENCOUNTER
Patient called and spoke with writer.    Per patient:  1. Was told no available appts today so transferred to triage  2. Blood in urine for 2 days   A. Only occurs at 0300/0400 and then not present with urination later in the day  3. Has lab only appt today but would like provider appt  4. Afebrile and no dysuria    Writer checked [place] and [facility] for availability and no appts today.    Patient would like to be evaluated today and asked if any clinics near [place].    Writer offered to transfer patient back to reception to look for same day appt and explained to patient her lab work can be drawn at any [facility].    ITALO Gill, SHASHANKN, RN

## 2018-01-03 LAB
ANION GAP SERPL CALCULATED.3IONS-SCNC: 7 MMOL/L (ref 3–14)
BUN SERPL-MCNC: 32 MG/DL (ref 7–30)
CALCIUM SERPL-MCNC: 9.2 MG/DL (ref 8.5–10.1)
CHLORIDE SERPL-SCNC: 101 MMOL/L (ref 94–109)
CO2 SERPL-SCNC: 27 MMOL/L (ref 20–32)
CREAT SERPL-MCNC: 1.03 MG/DL (ref 0.52–1.04)
GFR SERPL CREATININE-BSD FRML MDRD: 51 ML/MIN/1.7M2
GLUCOSE SERPL-MCNC: 99 MG/DL (ref 70–99)
POTASSIUM SERPL-SCNC: 4.9 MMOL/L (ref 3.4–5.3)
SODIUM SERPL-SCNC: 135 MMOL/L (ref 133–144)

## 2018-01-04 DIAGNOSIS — R79.89 ELEVATED TROPONIN: ICD-10-CM

## 2018-01-04 RX ORDER — FUROSEMIDE 20 MG
20 TABLET ORAL DAILY
Qty: 30 TABLET | Refills: 3 | Status: SHIPPED | OUTPATIENT
Start: 2018-01-04 | End: 2018-02-26

## 2018-01-08 ENCOUNTER — TELEPHONE (OUTPATIENT)
Dept: FAMILY MEDICINE | Facility: CLINIC | Age: 83
End: 2018-01-08

## 2018-01-08 DIAGNOSIS — R31.9 HEMATURIA, UNSPECIFIED TYPE: Primary | ICD-10-CM

## 2018-01-08 NOTE — TELEPHONE ENCOUNTER
Patient states she was seen in UC last week and treated for a UTI.  Patient only has 1 more pill left for today and urine is still pink  No UC was done last week  Patient was treated with Keflex by UC provider.  Recommend she be seen in clinic for recheck  Patient prefers o see Dr Zapien as he is her PCP.  Appointment scheduled for tomorrow afternoon.  Patient to complete antibiotics.  Raina Al RN

## 2018-01-09 ENCOUNTER — OFFICE VISIT (OUTPATIENT)
Dept: FAMILY MEDICINE | Facility: CLINIC | Age: 83
End: 2018-01-09
Payer: MEDICARE

## 2018-01-09 VITALS
WEIGHT: 156.25 LBS | OXYGEN SATURATION: 96 % | RESPIRATION RATE: 16 BRPM | TEMPERATURE: 98.4 F | DIASTOLIC BLOOD PRESSURE: 52 MMHG | HEART RATE: 61 BPM | SYSTOLIC BLOOD PRESSURE: 114 MMHG | HEIGHT: 57 IN | BODY MASS INDEX: 33.71 KG/M2

## 2018-01-09 DIAGNOSIS — R39.89 URINARY PROBLEM: Primary | ICD-10-CM

## 2018-01-09 DIAGNOSIS — R31.9 HEMATURIA, UNSPECIFIED TYPE: ICD-10-CM

## 2018-01-09 LAB

## 2018-01-09 PROCEDURE — 80048 BASIC METABOLIC PNL TOTAL CA: CPT | Performed by: FAMILY MEDICINE

## 2018-01-09 PROCEDURE — 81001 URINALYSIS AUTO W/SCOPE: CPT | Performed by: FAMILY MEDICINE

## 2018-01-09 PROCEDURE — 36415 COLL VENOUS BLD VENIPUNCTURE: CPT | Performed by: FAMILY MEDICINE

## 2018-01-09 PROCEDURE — 99213 OFFICE O/P EST LOW 20 MIN: CPT | Performed by: FAMILY MEDICINE

## 2018-01-09 NOTE — PROGRESS NOTES
SUBJECTIVE:   Dimple Oviedo is a 84 year old female who presents to clinic today for the following health issues:      URINARY TRACT SYMPTOMS      Duration: x about 2 weeks    Description  Pink urine- was seen at urgent care on 1/2/18- giving keflex- finished last night    Intensity:  moderate    Accompanying signs and symptoms:  Fever/chills: no   Flank pain no   Nausea and vomiting: no   Vaginal symptoms: none  Abdominal/Pelvic Pain: no     History  History of frequent UTI's: no   History of kidney stones: no     Precipitating or alleviating factors: None    Therapies tried and outcome: Keflex   Outcome: seems like it helped a bit    History of kidney stone.    No back pain. Was given keflex in urgent care.  Was not having major symptoms initially and keflex did not make any major difference.   No use of pyridium.   Never been a smoker.   Not on blood thinner.     Problem list and histories reviewed & adjusted, as indicated.  Additional history: as documented    Labs reviewed in EPIC    Reviewed and updated as needed this visit by clinical staffTobacco  Allergies  Meds  Med Hx  Surg Hx  Fam Hx  Soc Hx      Reviewed and updated as needed this visit by Provider      Social History     Social History     Marital status:      Spouse name:      Number of children: 2     Years of education: N/A     Occupational History      Retired     Social History Main Topics     Smoking status: Never Smoker     Smokeless tobacco: Never Used     Alcohol use No      Comment: 6 times per year-one drink     Drug use: No     Sexual activity: Yes     Partners: Male     Other Topics Concern      Service No     Blood Transfusions No     Exercise Yes     curves     Seat Belt Yes     Self-Exams Yes     Parent/Sibling W/ Cabg, Mi Or Angioplasty Before 65f 55m? No     Social History Narrative    December 7, 2009    Balanced Diet - Yes    Osteoporosis Prevention Measures - Dairy servings per day: 2 and  Medication/Supplements (See current meds)    Regular Exercise -  Yes Describe curves, walking    Dental Exam - YES - Date: 10/2009    Eye Exam - YES - Date: 2008    Self Breast Exam - Yes    Abuse: Current or Past (Physical, Sexual or Emotional)- No    Do you feel safe in your environment - Yes    Guns stored in the home - No    Sunscreen used - Yes    Seatbelts used - Yes    Lipids -  YES - Date: 11/24/2009    Glucose -  YES - Date: 11/24/2009    Colon Cancer Screening - Colonoscopy 11/18/2005(date completed)    Hemoccults - YES - Date: 10/12/2004    Pap Test -  YES - Date: 09/22/2004    Do you have any concerns about STD's -  No    Mammography - YES - Date: 11/24/2009    DEXA - YES - Date: 11/20/2008    Immunizations reviewed and up to date - Yes    PAULETTE Spencer CMA                 Allergies   Allergen Reactions     No Known Drug Allergies      Patient Active Problem List   Diagnosis     Esophageal reflux     restless leg     heat intolerance     Goiter     Disorder of bone and cartilage     Other psoriasis     perirectal cyst     Malignant melanoma of skin of trunk, except scrotum (H)     Undiagnosed cardiac murmurs     Nontoxic multinodular goiter     Hyperlipidemia LDL goal <130     Hypertension goal BP (blood pressure) < 140/90     Urinary incontinence     Osteoarthritis of left knee     Hip arthritis     Polymyalgia rheumatica (H)     High risk medication use     Shoulder pain     Sepsis (H)     Impaired fasting blood sugar     Chronic bilateral low back pain without sciatica     Obesity, unspecified obesity severity, unspecified obesity type     Iron deficiency anemia, unspecified iron deficiency anemia type     NSTEMI (non-ST elevated myocardial infarction) (H)     Stress-induced cardiomyopathy     Reviewed medications, social history and  past medical and surgical history.    Review of system: for general, respiratory, CVS, GI and psychiatry negative except for noted above.     EXAM:  /52 (BP Location:  "Left arm, Patient Position: Chair, Cuff Size: Adult Regular)  Pulse 61  Temp 98.4  F (36.9  C) (Oral)  Resp 16  Ht 4' 9\" (1.448 m)  Wt 156 lb 4 oz (70.9 kg)  SpO2 96%  BMI 33.81 kg/m2  Constitutional: healthy, alert and no distress   Psychiatric: Mildly anxious.    ASSESSMENT / PLAN:  (R39.89) Urinary problem  (primary encounter diagnosis)  Comment: Her UA still shows some WBC and some RBCs.  But compared to last time her RBC Load is significantly decreased.  I am not sure if she needs to get more antibiotic at this point it is her UA is not suggestive of a major infection.  We talk about repeating UA in few weeks and if it still shows microscopic hematuria she should have further workup including CT scan and seeing the urologist.  Plan: *UA reflex to Microscopic and Culture ([place]         and [facility] (except [place] and         [place]), Urine Microscopic, Basic metabolic         panel, UA reflex to Microscopic and Culture             (R31.9) Hematuria, unspecified type  Comment:    Plan: Basic metabolic panel, UA reflex to Microscopic        and Culture          She will come back for lab only appointment for UA and if UA is still abnormal I will refer her to urology scan ordered CT scan as mentioned above.    "

## 2018-01-09 NOTE — MR AVS SNAPSHOT
After Visit Summary   1/9/2018    Dimple Oviedo    MRN: [id number]           Patient Information     Date Of Birth          6/23/1933        Visit Information        Provider Department      1/9/2018 1:00 PM Pop Zapien MD [facility]        Today's Diagnoses     Urinary problem    -  1    Hematuria, unspecified type           Follow-ups after your visit        Your next 10 appointments already scheduled     Jan 24, 2018  1:30 PM CST   Lab with  LAB   [facility] ([facility])    [address]  1st Floor  [facility] [address] [phone]            Jan 24, 2018  2:00 PM CST   (Arrive by 1:45 PM)   CORE RETURN with Lynn Cyr NP   [facility] ([facility])    [address] [place] [address]  [address]  [facility] [address] [phone]              Future tests that were ordered for you today     Open Future Orders        Priority Expected Expires Ordered    UA reflex to Microscopic and Culture Routine  1/9/2019 1/9/2018            Who to contact     If you have questions or need follow up information about today's clinic visit or your schedule please contact [facility] directly at [phone].  Normal or non-critical lab and imaging results will be communicated to you by MyChart, letter or phone within 4 business days after the clinic has received the results. If you do not hear from us within 7 days, please contact the clinic through [employer] or phone. If you have a critical or abnormal lab result, we will notify you by phone as soon as possible.  Submit refill requests through [employer] or call your pharmacy and they will forward the refill request to us. Please allow 3 business days for your refill to be completed.          Additional Information About Your Visit        [employer] Information     [employer] gives you secure access to your electronic health record. If you see a primary  "care provider, you can also send messages to your care team and make appointments. If you have questions, please call your primary care clinic.  If you do not have a primary care provider, please call [phone] and they will assist you.        Care EveryWhere ID     This is your Care EveryWhere ID. This could be used by other organizations to access your [place] medical records  [id number]        Your Vitals Were     Pulse Temperature Respirations Height Pulse Oximetry BMI (Body Mass Index)    61 98.4  F (36.9  C) (Oral) 16 4' 9\" (1.448 m) 96% 33.81 kg/m2       Blood Pressure from Last 3 Encounters:   01/09/18 114/52   01/02/18 105/55   12/20/17 124/62    Weight from Last 3 Encounters:   01/09/18 156 lb 4 oz (70.9 kg)   01/02/18 163 lb (73.9 kg)   12/20/17 163 lb (73.9 kg)              We Performed the Following     *UA reflex to Microscopic and Culture ([place] [facility] (except [place] and [place])     Basic metabolic panel     Urine Microscopic          Today's Medication Changes          These changes are accurate as of: 1/9/18 11:59 PM.  If you have any questions, ask your nurse or doctor.               These medicines have changed or have updated prescriptions.        Dose/Directions    aspirin 81 MG tablet   This may have changed:  See the new instructions.   Used for:  Routine medical exam        ONE DAILY   Quantity:  100   Refills:  3                Primary Care Provider Office Phone # Fax #    Pop Antonino Zapien [phone] [address] [place] [address]        Equal Access to Services     [facility] AH: Hadii shameka Pollack, waaxda luqadaha, qaybta kaalmaldonado shah . So [facility] [phone].    ATENCIÓN: Si habla español, tiene a sierra disposición servicios gratuitos de asistencia lingüística. Llame al [phone].    We comply with applicable federal civil rights laws and [place] laws. We do not " "discriminate on the basis of race, color, national origin, age, disability, sex, sexual orientation, or gender identity.            Thank you!     Thank you for choosing [facility]  for your care. Our goal is always to provide you with excellent care. Hearing back from our patients is one way we can continue to improve our services. Please take a few minutes to complete the written survey that you may receive in the mail after your visit with us. Thank you!             Your Updated Medication List - Protect others around you: Learn how to safely use, store and throw away your medicines at www.disposemymeds.org.          This list is accurate as of: 1/9/18 11:59 PM.  Always use your most recent med list.                   Brand Name Dispense Instructions for use Diagnosis    acetaminophen 650 MG 8 hour tablet     100 tablet    Take 650 mg by mouth every 8 hours as needed for mild pain or fever    Ascending cholangitis       alendronate 70 MG tablet    FOSAMAX    12 tablet    TAKE 1 TABLET EVERY 7 DAYS AT LEAST 60 MIN BEFORE BREAKFAST AS DIRECTED \"SEE PACKAGE FOR ADDITIONAL INSTRUCTIONS\"    High risk medication use, Osteopenia       aspirin 81 MG tablet     100    ONE DAILY    Routine medical exam       CALCIUM 500 + D 500-200 MG-IU Tabs      1 tablets 3 times daily        CRANBERRY CONCENTRATE PO      Take 1 capsule by mouth 2 times daily        Fish Oil 500 MG Caps      Take 1 capsule by mouth 3 times daily        furosemide 20 MG tablet    LASIX    30 tablet    Take 1 tablet (20 mg) by mouth daily    Elevated troponin       irbesartan 300 MG tablet    AVAPRO    90 tablet    TAKE 1 TABLET EVERY DAY    Essential hypertension with goal blood pressure less than 140/90       lovastatin 40 MG tablet    MEVACOR    90 tablet    TAKE 1 TABLET AT BEDTIME (HYPERLIPIDEMIA LDL GOAL  BELOW 130)    Hyperlipidemia LDL goal <130       melatonin 3 MG tablet     90 tablet    Take 1 tablet (3 mg) by mouth nightly as " needed for sleep    Insomnia       nitroGLYcerin 0.4 MG sublingual tablet    NITROSTAT    25 tablet    For chest pain place 1 tablet under the tongue every 5 minutes for 3 doses. If symptoms persist 5 minutes after 1st dose call 911.    Elevated troponin       ofloxacin 0.3 % ophthalmic solution    OCUFLOX     Place 1 drop into the right eye 4 times daily        omeprazole 20 MG CR capsule    priLOSEC    180 capsule    Take 1 capsule (20 mg) by mouth daily    Gastroesophageal reflux disease without esophagitis       polyethylene glycol Packet    MIRALAX/GLYCOLAX    7 packet    Take 17 g by mouth 2 times daily as needed for constipation    Other constipation       prednisoLONE acetate 1 % ophthalmic susp    PRED FORTE     Place 1 drop into the right eye 4 times daily Taper as directed        propranolol 60 MG 24 hr capsule    INDERAL LA     Take 60 mg by mouth daily        spironolactone 25 MG tablet    ALDACTONE    30 tablet    Take 0.5 tablets (12.5 mg) by mouth daily    Acute on chronic systolic congestive heart failure (H)       tobramycin-dexamethasone 0.3-0.1 % ophthalmic susp    TOBRADEX     Place 1 drop into the right eye 4 times daily

## 2018-01-10 LAB
ANION GAP SERPL CALCULATED.3IONS-SCNC: 10 MMOL/L (ref 3–14)
BUN SERPL-MCNC: 24 MG/DL (ref 7–30)
CALCIUM SERPL-MCNC: 9.5 MG/DL (ref 8.5–10.1)
CHLORIDE SERPL-SCNC: 99 MMOL/L (ref 94–109)
CO2 SERPL-SCNC: 24 MMOL/L (ref 20–32)
CREAT SERPL-MCNC: 0.96 MG/DL (ref 0.52–1.04)
GFR SERPL CREATININE-BSD FRML MDRD: 55 ML/MIN/1.7M2
GLUCOSE SERPL-MCNC: 97 MG/DL (ref 70–99)
POTASSIUM SERPL-SCNC: 5.3 MMOL/L (ref 3.4–5.3)
SODIUM SERPL-SCNC: 133 MMOL/L (ref 133–144)

## 2018-01-10 NOTE — TELEPHONE ENCOUNTER
Called Day and reviewed message per below from Dr. Zapien, along with scheduling number for CT scan and urology referral.    Per Day, prefer [place] urology locations.    Writer reviewed the two [place] urology locations listed on referral.    Day asked if urology appt should be scheduled prior to CT scan and writer informed Day it will likely be a while before patient can be seen by urologist, so please do get urology appt scheduled at earliest convenience and CT scan will likely be able to be completed within the next 2-3 days.    Day verbalized understanding.  SHASHANK MurrellN, RN

## 2018-01-10 NOTE — TELEPHONE ENCOUNTER
Patient's daughter in law, Day, called and spoke with writer.    Consent to communicate on file.    Per Day:  1. Is currently with patient  2. Patient evaluated by PCP yesterday  3. Instructed to notify him if urine becomes darker and he would refer patient to urology   A. Today, urine is a darker pink  4. Patient does not have pain nor fever    Writer pended urology referral.    Dr. Zapien-Please review and sign if agree.    Thank you!  SHASHANK MurrellN, RN

## 2018-01-10 NOTE — TELEPHONE ENCOUNTER
signed urology.   She should also have CT scan before seeing urologist. I have signed order for it. She should go for CT scan while waiting to see urologist.

## 2018-01-12 ENCOUNTER — PRE VISIT (OUTPATIENT)
Dept: UROLOGY | Facility: CLINIC | Age: 83
End: 2018-01-12

## 2018-01-16 ENCOUNTER — APPOINTMENT (OUTPATIENT)
Dept: CT IMAGING | Facility: CLINIC | Age: 83
End: 2018-01-16
Payer: MEDICARE

## 2018-01-16 ENCOUNTER — APPOINTMENT (OUTPATIENT)
Dept: GENERAL RADIOLOGY | Facility: CLINIC | Age: 83
End: 2018-01-16
Payer: MEDICARE

## 2018-01-16 ENCOUNTER — ANESTHESIA (OUTPATIENT)
Dept: SURGERY | Facility: CLINIC | Age: 83
End: 2018-01-16

## 2018-01-16 ENCOUNTER — ANESTHESIA EVENT (OUTPATIENT)
Dept: SURGERY | Facility: CLINIC | Age: 83
End: 2018-01-16

## 2018-01-16 ENCOUNTER — PRE VISIT (OUTPATIENT)
Dept: UROLOGY | Facility: CLINIC | Age: 83
End: 2018-01-16

## 2018-01-16 ENCOUNTER — HOSPITAL ENCOUNTER (OUTPATIENT)
Facility: CLINIC | Age: 83
Setting detail: OBSERVATION
Discharge: HOME | End: 2018-01-17
Attending: EMERGENCY MEDICINE | Admitting: INTERNAL MEDICINE
Payer: MEDICARE

## 2018-01-16 DIAGNOSIS — I50.23 ACUTE ON CHRONIC SYSTOLIC CONGESTIVE HEART FAILURE (H): ICD-10-CM

## 2018-01-16 DIAGNOSIS — I10 ESSENTIAL HYPERTENSION WITH GOAL BLOOD PRESSURE LESS THAN 140/90: ICD-10-CM

## 2018-01-16 DIAGNOSIS — I48.91 ATRIAL FIBRILLATION WITH RVR (H): ICD-10-CM

## 2018-01-16 LAB
ALBUMIN UR-MCNC: 100 MG/DL
ALBUMIN UR-MCNC: NEGATIVE MG/DL
ANION GAP SERPL CALCULATED.3IONS-SCNC: 11 MMOL/L (ref 3–14)
APPEARANCE UR: ABNORMAL
APPEARANCE UR: CLEAR
BASOPHILS # BLD AUTO: 0.1 10E9/L (ref 0–0.2)
BASOPHILS NFR BLD AUTO: 0.8 %
BILIRUB UR QL STRIP: NEGATIVE
BILIRUB UR QL STRIP: NEGATIVE
BUN SERPL-MCNC: 21 MG/DL (ref 7–30)
CALCIUM SERPL-MCNC: 8.8 MG/DL (ref 8.5–10.1)
CHLORIDE SERPL-SCNC: 99 MMOL/L (ref 94–109)
CO2 SERPL-SCNC: 23 MMOL/L (ref 20–32)
COLOR UR AUTO: ABNORMAL
COLOR UR AUTO: ABNORMAL
CREAT SERPL-MCNC: 0.76 MG/DL (ref 0.52–1.04)
DIFFERENTIAL METHOD BLD: NORMAL
EOSINOPHIL # BLD AUTO: 0.2 10E9/L (ref 0–0.7)
EOSINOPHIL NFR BLD AUTO: 2.5 %
ERYTHROCYTE [DISTWIDTH] IN BLOOD BY AUTOMATED COUNT: 13.7 % (ref 10–15)
GFR SERPL CREATININE-BSD FRML MDRD: 73 ML/MIN/1.7M2
GLUCOSE SERPL-MCNC: 117 MG/DL (ref 70–99)
GLUCOSE UR STRIP-MCNC: NEGATIVE MG/DL
GLUCOSE UR STRIP-MCNC: NEGATIVE MG/DL
HCT VFR BLD AUTO: 37.8 % (ref 35–47)
HGB BLD-MCNC: 12.5 G/DL (ref 11.7–15.7)
HGB UR QL STRIP: ABNORMAL
HGB UR QL STRIP: ABNORMAL
IMM GRANULOCYTES # BLD: 0 10E9/L (ref 0–0.4)
IMM GRANULOCYTES NFR BLD: 0.2 %
INR PPP: 1.71 (ref 0.86–1.14)
INTERPRETATION ECG - MUSE: NORMAL
KETONES UR STRIP-MCNC: 5 MG/DL
KETONES UR STRIP-MCNC: NEGATIVE MG/DL
LEUKOCYTE ESTERASE UR QL STRIP: ABNORMAL
LEUKOCYTE ESTERASE UR QL STRIP: ABNORMAL
LYMPHOCYTES # BLD AUTO: 1.6 10E9/L (ref 0.8–5.3)
LYMPHOCYTES NFR BLD AUTO: 25.3 %
MAGNESIUM SERPL-MCNC: 2 MG/DL (ref 1.6–2.3)
MAGNESIUM SERPL-MCNC: 2.1 MG/DL (ref 1.6–2.3)
MCH RBC QN AUTO: 29.1 PG (ref 26.5–33)
MCHC RBC AUTO-ENTMCNC: 33.1 G/DL (ref 31.5–36.5)
MCV RBC AUTO: 88 FL (ref 78–100)
MONOCYTES # BLD AUTO: 0.7 10E9/L (ref 0–1.3)
MONOCYTES NFR BLD AUTO: 10.8 %
MUCOUS THREADS #/AREA URNS LPF: PRESENT /LPF
NEUTROPHILS # BLD AUTO: 3.9 10E9/L (ref 1.6–8.3)
NEUTROPHILS NFR BLD AUTO: 60.4 %
NITRATE UR QL: NEGATIVE
NITRATE UR QL: NEGATIVE
NRBC # BLD AUTO: 0 10*3/UL
NRBC BLD AUTO-RTO: 0 /100
NT-PROBNP SERPL-MCNC: 636 PG/ML (ref 0–1800)
PH UR STRIP: 6.5 PH (ref 5–7)
PH UR STRIP: 7.5 PH (ref 5–7)
PLATELET # BLD AUTO: 293 10E9/L (ref 150–450)
POTASSIUM SERPL-SCNC: 4.9 MMOL/L (ref 3.4–5.3)
POTASSIUM SERPL-SCNC: 5.3 MMOL/L (ref 3.4–5.3)
RBC # BLD AUTO: 4.29 10E12/L (ref 3.8–5.2)
RBC #/AREA URNS AUTO: <1 /HPF (ref 0–2)
RBC #/AREA URNS AUTO: >182 /HPF (ref 0–2)
SODIUM SERPL-SCNC: 133 MMOL/L (ref 133–144)
SOURCE: ABNORMAL
SOURCE: ABNORMAL
SP GR UR STRIP: 1 (ref 1–1.03)
SP GR UR STRIP: 1.01 (ref 1–1.03)
T4 FREE SERPL-MCNC: 1.41 NG/DL (ref 0.76–1.46)
TROPONIN I SERPL-MCNC: <0.015 UG/L (ref 0–0.04)
TSH SERPL DL<=0.005 MIU/L-ACNC: <0.01 MU/L (ref 0.4–4)
UROBILINOGEN UR STRIP-MCNC: NORMAL MG/DL (ref 0–2)
UROBILINOGEN UR STRIP-MCNC: NORMAL MG/DL (ref 0–2)
WBC # BLD AUTO: 6.4 10E9/L (ref 4–11)
WBC #/AREA URNS AUTO: 0 /HPF (ref 0–2)
WBC #/AREA URNS AUTO: 4 /HPF (ref 0–2)

## 2018-01-16 PROCEDURE — 96365 THER/PROPH/DIAG IV INF INIT: CPT | Performed by: EMERGENCY MEDICINE

## 2018-01-16 PROCEDURE — 25000128 H RX IP 250 OP 636: Performed by: EMERGENCY MEDICINE

## 2018-01-16 PROCEDURE — 84484 ASSAY OF TROPONIN QUANT: CPT | Performed by: EMERGENCY MEDICINE

## 2018-01-16 PROCEDURE — 85610 PROTHROMBIN TIME: CPT | Performed by: NURSE PRACTITIONER

## 2018-01-16 PROCEDURE — 74176 CT ABD & PELVIS W/O CONTRAST: CPT

## 2018-01-16 PROCEDURE — 83735 ASSAY OF MAGNESIUM: CPT | Performed by: EMERGENCY MEDICINE

## 2018-01-16 PROCEDURE — 85025 COMPLETE CBC W/AUTO DIFF WBC: CPT | Performed by: EMERGENCY MEDICINE

## 2018-01-16 PROCEDURE — 83880 ASSAY OF NATRIURETIC PEPTIDE: CPT | Performed by: EMERGENCY MEDICINE

## 2018-01-16 PROCEDURE — 80048 BASIC METABOLIC PNL TOTAL CA: CPT | Performed by: EMERGENCY MEDICINE

## 2018-01-16 PROCEDURE — 25000132 ZZH RX MED GY IP 250 OP 250 PS 637: Performed by: STUDENT IN AN ORGANIZED HEALTH CARE EDUCATION/TRAINING PROGRAM

## 2018-01-16 PROCEDURE — 99285 EMERGENCY DEPT VISIT HI MDM: CPT | Mod: 25 | Performed by: EMERGENCY MEDICINE

## 2018-01-16 PROCEDURE — 25000125 ZZHC RX 250: Performed by: STUDENT IN AN ORGANIZED HEALTH CARE EDUCATION/TRAINING PROGRAM

## 2018-01-16 PROCEDURE — 84443 ASSAY THYROID STIM HORMONE: CPT | Performed by: EMERGENCY MEDICINE

## 2018-01-16 PROCEDURE — 83735 ASSAY OF MAGNESIUM: CPT | Performed by: NURSE PRACTITIONER

## 2018-01-16 PROCEDURE — 25000125 ZZHC RX 250: Performed by: EMERGENCY MEDICINE

## 2018-01-16 PROCEDURE — 71045 X-RAY EXAM CHEST 1 VIEW: CPT

## 2018-01-16 PROCEDURE — 25000132 ZZH RX MED GY IP 250 OP 250 PS 637: Mod: GY | Performed by: NURSE PRACTITIONER

## 2018-01-16 PROCEDURE — 99223 1ST HOSP IP/OBS HIGH 75: CPT | Mod: GC | Performed by: INTERNAL MEDICINE

## 2018-01-16 PROCEDURE — A9270 NON-COVERED ITEM OR SERVICE: HCPCS | Performed by: STUDENT IN AN ORGANIZED HEALTH CARE EDUCATION/TRAINING PROGRAM

## 2018-01-16 PROCEDURE — 84439 ASSAY OF FREE THYROXINE: CPT | Performed by: EMERGENCY MEDICINE

## 2018-01-16 PROCEDURE — 96375 TX/PRO/DX INJ NEW DRUG ADDON: CPT | Performed by: EMERGENCY MEDICINE

## 2018-01-16 PROCEDURE — 25000128 H RX IP 250 OP 636: Performed by: STUDENT IN AN ORGANIZED HEALTH CARE EDUCATION/TRAINING PROGRAM

## 2018-01-16 PROCEDURE — 93005 ELECTROCARDIOGRAM TRACING: CPT | Performed by: EMERGENCY MEDICINE

## 2018-01-16 PROCEDURE — 36415 COLL VENOUS BLD VENIPUNCTURE: CPT | Performed by: NURSE PRACTITIONER

## 2018-01-16 PROCEDURE — 81001 URINALYSIS AUTO W/SCOPE: CPT | Performed by: EMERGENCY MEDICINE

## 2018-01-16 PROCEDURE — 84132 ASSAY OF SERUM POTASSIUM: CPT | Performed by: NURSE PRACTITIONER

## 2018-01-16 PROCEDURE — 96375 TX/PRO/DX INJ NEW DRUG ADDON: CPT

## 2018-01-16 PROCEDURE — 93010 ELECTROCARDIOGRAM REPORT: CPT | Mod: Z6 | Performed by: EMERGENCY MEDICINE

## 2018-01-16 PROCEDURE — G0378 HOSPITAL OBSERVATION PER HR: HCPCS

## 2018-01-16 PROCEDURE — 81001 URINALYSIS AUTO W/SCOPE: CPT | Performed by: STUDENT IN AN ORGANIZED HEALTH CARE EDUCATION/TRAINING PROGRAM

## 2018-01-16 PROCEDURE — 25000132 ZZH RX MED GY IP 250 OP 250 PS 637: Mod: GY

## 2018-01-16 PROCEDURE — 96366 THER/PROPH/DIAG IV INF ADDON: CPT

## 2018-01-16 PROCEDURE — A9270 NON-COVERED ITEM OR SERVICE: HCPCS | Mod: GY | Performed by: NURSE PRACTITIONER

## 2018-01-16 RX ORDER — POTASSIUM CHLORIDE 1500 MG/1
20 TABLET, EXTENDED RELEASE ORAL
Status: DISCONTINUED | OUTPATIENT
Start: 2018-01-16 | End: 2018-01-16 | Stop reason: HOSPADM

## 2018-01-16 RX ORDER — PROPRANOLOL HCL 60 MG
60 CAPSULE, EXTENDED RELEASE 24HR ORAL DAILY
Status: DISCONTINUED | OUTPATIENT
Start: 2018-01-16 | End: 2018-01-17 | Stop reason: HOSPADM

## 2018-01-16 RX ORDER — CYCLOSPORINE 0.5 MG/ML
1 EMULSION OPHTHALMIC 2 TIMES DAILY
COMMUNITY
End: 2021-10-27

## 2018-01-16 RX ORDER — ONDANSETRON 2 MG/ML
4 INJECTION INTRAMUSCULAR; INTRAVENOUS EVERY 6 HOURS PRN
Status: DISCONTINUED | OUTPATIENT
Start: 2018-01-16 | End: 2018-01-17 | Stop reason: HOSPADM

## 2018-01-16 RX ORDER — POTASSIUM CHLORIDE 750 MG/1
40 TABLET, EXTENDED RELEASE ORAL
Status: DISCONTINUED | OUTPATIENT
Start: 2018-01-16 | End: 2018-01-16 | Stop reason: HOSPADM

## 2018-01-16 RX ORDER — FENTANYL CITRATE 50 UG/ML
25 INJECTION, SOLUTION INTRAMUSCULAR; INTRAVENOUS
Status: DISCONTINUED | OUTPATIENT
Start: 2018-01-16 | End: 2018-01-16 | Stop reason: HOSPADM

## 2018-01-16 RX ORDER — SODIUM CHLORIDE 9 MG/ML
1000 INJECTION, SOLUTION INTRAVENOUS CONTINUOUS
Status: DISCONTINUED | OUTPATIENT
Start: 2018-01-16 | End: 2018-01-16 | Stop reason: HOSPADM

## 2018-01-16 RX ORDER — POTASSIUM CHLORIDE 1500 MG/1
40 TABLET, EXTENDED RELEASE ORAL
Status: DISCONTINUED | OUTPATIENT
Start: 2018-01-16 | End: 2018-01-16 | Stop reason: HOSPADM

## 2018-01-16 RX ORDER — FLUMAZENIL 0.1 MG/ML
0.2 INJECTION, SOLUTION INTRAVENOUS
Status: DISCONTINUED | OUTPATIENT
Start: 2018-01-16 | End: 2018-01-16 | Stop reason: HOSPADM

## 2018-01-16 RX ORDER — POTASSIUM CHLORIDE 750 MG/1
20 TABLET, EXTENDED RELEASE ORAL
Status: DISCONTINUED | OUTPATIENT
Start: 2018-01-16 | End: 2018-01-16 | Stop reason: HOSPADM

## 2018-01-16 RX ORDER — AMOXICILLIN 250 MG
1 CAPSULE ORAL 2 TIMES DAILY PRN
Status: DISCONTINUED | OUTPATIENT
Start: 2018-01-16 | End: 2018-01-17 | Stop reason: HOSPADM

## 2018-01-16 RX ORDER — FUROSEMIDE 10 MG/ML
20 INJECTION INTRAMUSCULAR; INTRAVENOUS ONCE
Status: COMPLETED | OUTPATIENT
Start: 2018-01-16 | End: 2018-01-16

## 2018-01-16 RX ORDER — FENTANYL CITRATE 50 UG/ML
25-50 INJECTION, SOLUTION INTRAMUSCULAR; INTRAVENOUS
Status: DISCONTINUED | OUTPATIENT
Start: 2018-01-16 | End: 2018-01-16 | Stop reason: HOSPADM

## 2018-01-16 RX ORDER — SPIRONOLACTONE 25 MG
12.5 TABLET ORAL DAILY
Status: DISCONTINUED | OUTPATIENT
Start: 2018-01-16 | End: 2018-01-17

## 2018-01-16 RX ORDER — AMOXICILLIN 250 MG
2 CAPSULE ORAL 2 TIMES DAILY PRN
Status: DISCONTINUED | OUTPATIENT
Start: 2018-01-16 | End: 2018-01-17 | Stop reason: HOSPADM

## 2018-01-16 RX ORDER — PREDNISOLONE ACETATE 10 MG/ML
1 SUSPENSION/ DROPS OPHTHALMIC 4 TIMES DAILY
Status: DISCONTINUED | OUTPATIENT
Start: 2018-01-16 | End: 2018-01-17 | Stop reason: HOSPADM

## 2018-01-16 RX ORDER — ACETAMINOPHEN 325 MG/1
650 TABLET ORAL EVERY 4 HOURS PRN
Status: DISCONTINUED | OUTPATIENT
Start: 2018-01-16 | End: 2018-01-17 | Stop reason: HOSPADM

## 2018-01-16 RX ORDER — IRBESARTAN 300 MG/1
300 TABLET ORAL DAILY
Status: DISCONTINUED | OUTPATIENT
Start: 2018-01-16 | End: 2018-01-17

## 2018-01-16 RX ORDER — NALOXONE HYDROCHLORIDE 0.4 MG/ML
.1-.4 INJECTION, SOLUTION INTRAMUSCULAR; INTRAVENOUS; SUBCUTANEOUS
Status: DISCONTINUED | OUTPATIENT
Start: 2018-01-16 | End: 2018-01-16 | Stop reason: HOSPADM

## 2018-01-16 RX ORDER — ATROPINE SULFATE 0.1 MG/ML
.5-1 INJECTION INTRAVENOUS
Status: DISCONTINUED | OUTPATIENT
Start: 2018-01-16 | End: 2018-01-16 | Stop reason: HOSPADM

## 2018-01-16 RX ORDER — ONDANSETRON 4 MG/1
4 TABLET, ORALLY DISINTEGRATING ORAL EVERY 6 HOURS PRN
Status: DISCONTINUED | OUTPATIENT
Start: 2018-01-16 | End: 2018-01-17 | Stop reason: HOSPADM

## 2018-01-16 RX ORDER — ASPIRIN 81 MG/1
81 TABLET, CHEWABLE ORAL DAILY
Status: DISCONTINUED | OUTPATIENT
Start: 2018-01-16 | End: 2018-01-17 | Stop reason: HOSPADM

## 2018-01-16 RX ORDER — TOBRAMYCIN AND DEXAMETHASONE 3; 1 MG/ML; MG/ML
1 SUSPENSION/ DROPS OPHTHALMIC 4 TIMES DAILY
Status: DISCONTINUED | OUTPATIENT
Start: 2018-01-16 | End: 2018-01-16

## 2018-01-16 RX ORDER — SIMVASTATIN 20 MG
20 TABLET ORAL AT BEDTIME
Status: DISCONTINUED | OUTPATIENT
Start: 2018-01-16 | End: 2018-01-17 | Stop reason: HOSPADM

## 2018-01-16 RX ORDER — DILTIAZEM HYDROCHLORIDE 5 MG/ML
10 INJECTION INTRAVENOUS ONCE
Status: COMPLETED | OUTPATIENT
Start: 2018-01-16 | End: 2018-01-16

## 2018-01-16 RX ORDER — NALOXONE HYDROCHLORIDE 0.4 MG/ML
.1-.4 INJECTION, SOLUTION INTRAMUSCULAR; INTRAVENOUS; SUBCUTANEOUS
Status: DISCONTINUED | OUTPATIENT
Start: 2018-01-16 | End: 2018-01-17 | Stop reason: HOSPADM

## 2018-01-16 RX ORDER — METHIMAZOLE 5 MG/1
5 TABLET ORAL 3 TIMES DAILY
Status: DISCONTINUED | OUTPATIENT
Start: 2018-01-16 | End: 2018-01-17 | Stop reason: HOSPADM

## 2018-01-16 RX ORDER — ACETAMINOPHEN 650 MG/1
650 SUPPOSITORY RECTAL EVERY 4 HOURS PRN
Status: DISCONTINUED | OUTPATIENT
Start: 2018-01-16 | End: 2018-01-17 | Stop reason: HOSPADM

## 2018-01-16 RX ORDER — LIDOCAINE 40 MG/G
CREAM TOPICAL
Status: DISCONTINUED | OUTPATIENT
Start: 2018-01-16 | End: 2018-01-16 | Stop reason: HOSPADM

## 2018-01-16 RX ORDER — CYCLOSPORINE 0.5 MG/ML
1 EMULSION OPHTHALMIC 2 TIMES DAILY
Status: DISCONTINUED | OUTPATIENT
Start: 2018-01-16 | End: 2018-01-16

## 2018-01-16 RX ADMIN — ASPIRIN 81 MG CHEWABLE TABLET 81 MG: 81 TABLET CHEWABLE at 11:56

## 2018-01-16 RX ADMIN — DILTIAZEM HYDROCHLORIDE 5 MG/HR: 5 INJECTION INTRAVENOUS at 08:57

## 2018-01-16 RX ADMIN — SIMVASTATIN 20 MG: 20 TABLET, FILM COATED ORAL at 21:51

## 2018-01-16 RX ADMIN — PREDNISOLONE ACETATE 1 DROP: 10 SUSPENSION/ DROPS OPHTHALMIC at 15:34

## 2018-01-16 RX ADMIN — DEXTRAN 70, AND HYPROMELLOSE 2910 1 DROP: 1; 3 SOLUTION/ DROPS OPHTHALMIC at 21:51

## 2018-01-16 RX ADMIN — PROPRANOLOL HYDROCHLORIDE 60 MG: 60 CAPSULE, EXTENDED RELEASE ORAL at 12:48

## 2018-01-16 RX ADMIN — RIVAROXABAN 20 MG: 10 TABLET, FILM COATED ORAL at 11:57

## 2018-01-16 RX ADMIN — DIVALPROEX SODIUM 12.5 MG: 500 TABLET, FILM COATED, EXTENDED RELEASE ORAL at 12:48

## 2018-01-16 RX ADMIN — PREDNISOLONE ACETATE 1 DROP: 10 SUSPENSION/ DROPS OPHTHALMIC at 19:51

## 2018-01-16 RX ADMIN — FUROSEMIDE 20 MG: 10 INJECTION, SOLUTION INTRAVENOUS at 11:57

## 2018-01-16 RX ADMIN — METHIMAZOLE 5 MG: 5 TABLET ORAL at 16:54

## 2018-01-16 RX ADMIN — DILTIAZEM HYDROCHLORIDE 10 MG: 5 INJECTION INTRAVENOUS at 06:51

## 2018-01-16 RX ADMIN — METHIMAZOLE 5 MG: 5 TABLET ORAL at 19:51

## 2018-01-16 RX ADMIN — IRBESARTAN 300 MG: 300 TABLET ORAL at 15:31

## 2018-01-16 ASSESSMENT — ENCOUNTER SYMPTOMS
PALPITATIONS: 1
ABDOMINAL PAIN: 0
DYSRHYTHMIAS: 1
FEVER: 0
COUGH: 0
SHORTNESS OF BREATH: 1

## 2018-01-16 ASSESSMENT — ACTIVITIES OF DAILY LIVING (ADL)
DRESS: 0 - INDEPENDENT
NUMBER_OF_TIMES_PATIENT_HAS_FALLEN_WITHIN_LAST_SIX_MONTHS: 1
COGNITION: 0 - NO COGNITION ISSUES REPORTED
DRESS: 0-->INDEPENDENT
AMBULATION: 2 - ASSISTIVE PERSON
FALL_HISTORY_WITHIN_LAST_SIX_MONTHS: YES
BATHING: 0 - INDEPENDENT
SWALLOWING: 0 - SWALLOWS FOODS/LIQUIDS WITHOUT DIFFICULTY
SWALLOWING: 0-->SWALLOWS FOODS/LIQUIDS WITHOUT DIFFICULTY
TRANSFERRING: 0 - INDEPENDENT
RETIRED_COMMUNICATION: 0-->UNDERSTANDS/COMMUNICATES WITHOUT DIFFICULTY
AMBULATION: 0-->INDEPENDENT
TOILETING: 0 - INDEPENDENT
EATING: 0 - INDEPENDENT
BATHING: 0-->INDEPENDENT
TRANSFERRING: 0-->INDEPENDENT
COMMUNICATION: 0 - UNDERSTANDS/COMMUNICATES WITHOUT DIFFICULTY
RETIRED_EATING: 0-->INDEPENDENT
TOILETING: 0-->INDEPENDENT

## 2018-01-16 NOTE — PROGRESS NOTES
"/68  Pulse 123  Temp 98.1  F (36.7  C) (Oral)  Resp 26  Ht 1.448 m (4' 9.01\")  Wt 69.6 kg (153 lb 6.4 oz)  SpO2 97%  BMI 33.19 kg/m2    OBSERVATION GOALS:   - diagnostic tests and consults completed and resulted: Goal unmet. SILVESTRE in process with cardioversion.   - vital signs normal or at patient baseline: Goal unmet. Patient has been in A-fib, unresolved at this time.   - afib controlled: Goal unmet. Patient at procedure now to correct Afib.     A&Ox4. A-fib OVSS on RA. Denies pain, SOB with exertion. Denies nausea. Skin intact. Pleasant and cooperative with care.   "

## 2018-01-16 NOTE — ED NOTES
Patient presents with c/o palpitations that began around 0200. Patient denies chest pain and SOB. Took nitro x2 prior to EMS arrival. Per EMS, EKG showed ST en route and BG was 153. EKG done during triage showed a fib with RVR. Not on blood thinners.

## 2018-01-16 NOTE — ED PROVIDER NOTES
History     Chief Complaint   Patient presents with     Palpitations     HPI  Dimple Oviedo is a 84 year old female who presents to the ER with a complaint of about 4-1/2 hours of racing heart/palpitations.  No associated chest pain, though she does have some mild shortness of breath, and feels shaky.  There is been no nausea, vomiting, diarrhea.  She denies fever cough, but notes she has been exposed to several people with influenza recently.  She recently was hospitalized with stress cardiomyopathy, with a reduced EF, and had a troponin leak, but her angiogram showed nonobstructive coronary disease.  She has been following with the core clinic since then.  She did not take her morning medications today.  She states the swelling in her legs is improving.  No calf pain.  No history of DVT or PE.  No history of atrial fibrillation, that she knows of.  No other specific complaints.    Past Medical History:   Diagnosis Date     Calculus of kidney      Esophageal reflux      Hyperlipidemia LDL goal <130 5/9/2010     Malignant melanoma of skin of trunk, except scrotum (H)      Nonspecific abnormal finding     has living will 2004 -      Nontoxic multinodular goiter     no further eval /tx rec per pt     Osteopenia      Other psoriasis      Personal history of colonic polyps      PMR (polymyalgia rheumatica) (H)      Undiagnosed cardiac murmurs      Unspecified constipation      Unspecified essential hypertension        Past Surgical History:   Procedure Laterality Date     C NONSPECIFIC PROCEDURE  2005    colonoscopy polyp repeat 2010     ENDOSCOPIC ULTRASOUND LOWER GASTROINTESTIONAL TRACT (GI) N/A 10/30/2015    Procedure: ENDOSCOPIC ULTRASOUND LOWER GASTROINTESTIONAL TRACT (GI);  Surgeon: Daniel Jean-Baptiste MD;  Location: [facility] OR     LAPAROSCOPIC CHOLECYSTECTOMY WITH CHOLANGIOGRAMS N/A 11/1/2015    Procedure: LAPAROSCOPIC CHOLECYSTECTOMY WITH CHOLANGIOGRAMS;  Surgeon: Tonie Warren MD;  Location: [facility] OR  "    SURGICAL HISTORY OF -   1996    malignant melanoma     SURGICAL HISTORY OF -   1968    thyroid nodule     SURGICAL HISTORY OF -       D & C       Family History   Problem Relation Age of Onset     CANCER Father      dec - esophageal and laryngeal     HEART DISEASE Mother      Respiratory Mother      dec       Social History   Substance Use Topics     Smoking status: Never Smoker     Smokeless tobacco: Never Used     Alcohol use No      Comment: 6 times per year-one drink         I have reviewed the Medications, Allergies, Past Medical and Surgical History, and Social History in the Epic system.    Review of Systems   Constitutional: Negative for fever.   Respiratory: Positive for shortness of breath. Negative for cough.    Cardiovascular: Positive for palpitations and leg swelling (improving). Negative for chest pain.   Gastrointestinal: Negative for abdominal pain.   Neurological:        Feels shakey   All other systems reviewed and are negative.      Physical Exam   BP: (!) 106/99  Pulse: 123  Heart Rate: 128  Temp: 97.8  F (36.6  C)  Resp: 18  Height: 144.8 cm (4' 9\")  Weight: 70.3 kg (155 lb) (no scale)  SpO2: 100 %      Physical Exam   Constitutional: No distress.   HENT:   Head: Atraumatic.   Mouth/Throat: Oropharynx is clear and moist.   Eyes: Pupils are equal, round, and reactive to light. No scleral icterus.   Cardiovascular: Normal heart sounds and intact distal pulses.    Tachy, irregular   Pulmonary/Chest: Breath sounds normal. No respiratory distress.   Abdominal: Soft. There is no tenderness.   Musculoskeletal: She exhibits no tenderness.   Skin: Skin is warm. No rash noted. She is not diaphoretic.       ED Course     ED Course     Procedures             EKG Interpretation:      Interpreted by Jada Yi  Time reviewed: 0630  Symptoms at time of EKG: mild dyspnea   Rhythm: atrial fibrillation - rapid  Rate: 123  Axis: left axis deviation  Ectopy: none  Conduction: normal  ST Segments/ T " Waves: Non-specific ST-T wave changes  Q Waves: none  Comparison to prior: new Afib with RVR    Clinical Impression:  atrial fibrillation (new onset), rapid                  Critical Care time:  none           Labs Ordered and Resulted from Time of ED Arrival Up to the Time of Departure from the ED   BASIC METABOLIC PANEL - Abnormal; Notable for the following:        Result Value    Glucose 117 (*)     All other components within normal limits   CBC WITH PLATELETS DIFFERENTIAL   MAGNESIUM   TROPONIN I   NT PROBNP INPATIENT   TSH WITH FREE T4 REFLEX   ROUTINE UA WITH MICROSCOPIC            Assessments & Plan (with Medical Decision Making)   The pt has A-fib with RVR. No associated chest pain, though she does have some mild dyspnea as well as shakiness. No clear trigger. Trop neg, lytes nl. Given single dose diltiazem with improvement in rate, though BP dropped. BP has now rebounded. Will start dilt drip (statring at a lower rate), and admit to cards for further stabilization and management. BNP and TSH pending at this time. No clear evidence to suggest infxn.    Dictation Disclaimer: Some of this Note has been completed with voice-recognition dictation software. Although errors are generally corrected real-time, there is the potential for a rare error to be present in the completed chart.      I have reviewed the nursing notes.    I have reviewed the findings, diagnosis, plan and need for follow up with the patient.    New Prescriptions    No medications on file       Final diagnoses:   Atrial fibrillation with RVR (H)       1/16/2018   [facility], [place], EMERGENCY DEPARTMENT     Jada Yi MD  01/16/18 0768

## 2018-01-16 NOTE — H&P
Cardiology H&P   Dimple Oviedo ([id number]) admitted on 1/16/2018  Primary care provider: Pop Zapien         Chief Complaint:     Palpitation 4.5 hours prior to admission         History of Present Illness     Dimple Oviedo is a 84 year old female with history of HTN, multinodular goiter, GERD, hyperthyroidism, stress cardiomyopathy (dx 12/2017) who presented to ED with 4.5 hours of palpitation.    Hx obtained from pt and daughter  She has been doing well since the last hospital admission. She is not living with her family in [place]. Recently noted to have an episode of pink 2 weeks ago, no having dysuria/frequency/fever/abdominal pain. Went to urgent care and received abx for 5 days. The pink urine persisted despite Abx. Went to see PCP and was recommended to get CT abdomen pelvis which was scheduled on February. Apart from that, she was seen by an endocrinologist for thyroid problem and is now on methimazole 5 mg tid    She was feeling ok in the past 1 week. No other illness that she recalled. However, earlier today, she developed an episode of palpitation/heart racing that actually woke her up in the night. Denies having chest pain. No increase SOB. No PND/Orthopnea. No abdominal pain    ED course : Found to have Afib with RVR, up to floor  While awaiting to get SILVESTRE/DCCV, received 1 dose of rivaroxaban and has an episode of gross hematuria. Thus DCCV was canceled    Recent admission(s)   12/13-20/17: Stress induced cardiomyopathy, LHC no obstructive disease    ROS (+) none  ROS (-) HA, fever, chills, night sweats, lumps, bumps, rashes   Sore throat, SOB, cough, CP   Abdominal pain, Nausea, vomitting, diarrhea, constipation   Remainder of 12pt-ROS negative except mentioned above         Other Hx   PMHx    Patient Active Problem List   Diagnosis     Esophageal reflux     restless leg     heat intolerance     Goiter     Disorder of bone and cartilage     Other psoriasis     perirectal cyst      Malignant melanoma of skin of trunk, except scrotum (H)     Undiagnosed cardiac murmurs     Nontoxic multinodular goiter     Hyperlipidemia LDL goal <130     Hypertension goal BP (blood pressure) < 140/90     Urinary incontinence     Osteoarthritis of left knee     Hip arthritis     Polymyalgia rheumatica (H)     High risk medication use     Shoulder pain     Sepsis (H)     Impaired fasting blood sugar     Chronic bilateral low back pain without sciatica     Obesity, unspecified obesity severity, unspecified obesity type     Iron deficiency anemia, unspecified iron deficiency anemia type     NSTEMI (non-ST elevated myocardial infarction) (H)     Stress-induced cardiomyopathy       PSx    Past Surgical History:   Procedure Laterality Date     C NONSPECIFIC PROCEDURE  2005    colonoscopy polyp repeat 2010     ENDOSCOPIC ULTRASOUND LOWER GASTROINTESTIONAL TRACT (GI) N/A 10/30/2015    Procedure: ENDOSCOPIC ULTRASOUND LOWER GASTROINTESTIONAL TRACT (GI);  Surgeon: Daniel Jean-Baptiste MD;  Location: [facility] OR     LAPAROSCOPIC CHOLECYSTECTOMY WITH CHOLANGIOGRAMS N/A 11/1/2015    Procedure: LAPAROSCOPIC CHOLECYSTECTOMY WITH CHOLANGIOGRAMS;  Surgeon: Tonie Warren MD;  Location: [facility] OR     SURGICAL HISTORY OF -   1996    malignant melanoma     SURGICAL HISTORY OF -   1968    thyroid nodule     SURGICAL HISTORY OF -       D & C       Medications    No current facility-administered medications on file prior to encounter.   Current Outpatient Prescriptions on File Prior to Encounter:  furosemide (LASIX) 20 MG tablet Take 1 tablet (20 mg) by mouth daily   spironolactone (ALDACTONE) 25 MG tablet Take 0.5 tablets (12.5 mg) by mouth daily   nitroGLYcerin (NITROSTAT) 0.4 MG sublingual tablet For chest pain place 1 tablet under the tongue every 5 minutes for 3 doses. If symptoms persist 5 minutes after 1st dose call 911.   omeprazole (PRILOSEC) 20 MG CR capsule Take 1 capsule (20 mg) by mouth daily   polyethylene glycol  "(MIRALAX/GLYCOLAX) packet Take 17 g by mouth 2 times daily as needed for constipation   melatonin 3 MG tablet Take 1 tablet (3 mg) by mouth nightly as needed for sleep   ASPIRIN 81 MG OR TABS ONE DAILY (Patient taking differently: ONE DAILY at bedtime)   propranolol (INDERAL LA) 60 MG 24 hr capsule Take 60 mg by mouth daily   ofloxacin (OCUFLOX) 0.3 % ophthalmic solution Place 1 drop into the right eye 4 times daily   tobramycin-dexamethasone (TOBRADEX) 0.3-0.1 % ophthalmic susp Place 1 drop into the right eye 4 times daily   CRANBERRY CONCENTRATE PO Take 1 capsule by mouth 2 times daily   lovastatin (MEVACOR) 40 MG tablet TAKE 1 TABLET AT BEDTIME (HYPERLIPIDEMIA LDL GOAL  BELOW 130)   alendronate (FOSAMAX) 70 MG tablet TAKE 1 TABLET EVERY 7 DAYS AT LEAST 60 MIN BEFORE BREAKFAST AS DIRECTED \"SEE PACKAGE FOR ADDITIONAL INSTRUCTIONS\"   irbesartan (AVAPRO) 300 MG tablet TAKE 1 TABLET EVERY DAY   acetaminophen 650 MG TABS Take 650 mg by mouth every 8 hours as needed for mild pain or fever   Omega-3 Fatty Acids (FISH OIL) 500 MG CAPS Take 1 capsule by mouth 3 times daily   prednisoLONE acetate (PRED FORTE) 1 % ophthalmic suspension Place 1 drop into the right eye 4 times daily Taper as directed   CALCIUM 500 + D 500-200 MG-IU OR TABS 1 tablets 3 times daily       Allergy  Allergies   Allergen Reactions     No Known Drug Allergies        Family Hx  Family History   Problem Relation Age of Onset     CANCER Father      dec - esophageal and laryngeal     HEART DISEASE Mother      Respiratory Mother      dec       Social Hx  Social History     Social History     Marital status:      Spouse name:      Number of children: 2     Years of education: N/A     Occupational History      Retired     Social History Main Topics     Smoking status: Never Smoker     Smokeless tobacco: Never Used     Alcohol use No      Comment: 6 times per year-one drink     Drug use: No     Sexual activity: Yes     Partners: Male     Other " "Topics Concern      Service No     Blood Transfusions No     Exercise Yes     curves     Seat Belt Yes     Self-Exams Yes     Parent/Sibling W/ Cabg, Mi Or Angioplasty Before 65f 55m? No     Social History Narrative    December 7, 2009    Balanced Diet - Yes    Osteoporosis Prevention Measures - Dairy servings per day: 2 and Medication/Supplements (See current meds)    Regular Exercise -  Yes Describe curves, walking    Dental Exam - YES - Date: 10/2009    Eye Exam - YES - Date: 2008    Self Breast Exam - Yes    Abuse: Current or Past (Physical, Sexual or Emotional)- No    Do you feel safe in your environment - Yes    Guns stored in the home - No    Sunscreen used - Yes    Seatbelts used - Yes    Lipids -  YES - Date: 11/24/2009    Glucose -  YES - Date: 11/24/2009    Colon Cancer Screening - Colonoscopy 11/18/2005(date completed)    Hemoccults - YES - Date: 10/12/2004    Pap Test -  YES - Date: 09/22/2004    Do you have any concerns about STD's -  No    Mammography - YES - Date: 11/24/2009    DEXA - YES - Date: 11/20/2008    Immunizations reviewed and up to date - Yes    PAULETTE Spencer CMA                   Lives: home with family  Works: retired from [employer]  Smoke: never  Alcohol: none  Drug of abuse: none         Vitals and Exam/ Objectives     Physical exam:  BP (!) 140/95  Pulse 123  Temp 97.8  F (36.6  C) (Oral)  Resp 22  Ht 1.448 m (4' 9\")  Wt 70.3 kg (155 lb)  SpO2 100%  BMI 33.54 kg/m2  Wt Readings from Last 2 Encounters:   01/16/18 70.3 kg (155 lb)   01/09/18 70.9 kg (156 lb 4 oz)       Intake/Output Summary (Last 24 hours) at 01/16/18 0747  Last data filed at 01/16/18 0657   Gross per 24 hour   Intake                0 ml   Output             1000 ml   Net            -1000 ml       General: AAOx3, no clubbing/cyanosis, 2+ edema,  JVD to mid neck  HEENT:  PERRL, EOMI, MMM with out pharyngeal erythema.   Cardiac: irregular rhythm No m/r/g. Normal S1, S2. DP2+ bilaterally  Pulm: CTAB, no " wheezes,  No crackles.  Abd: +BS, soft, non distended, non tender. No hepatosplenomegaly.   No labia tear or wounds  Skin: No rash  MSK: No deformities, no joint swelling  Neuro: CN grossly intact, no focal deficits  Psych: normal mood, full affect    In dwelling lines at admission: none    Imaging/procedure results:  No results found for this or any previous visit (from the past 48 hour(s)).    CT 1/16/18 pending official read. Noted large stone on right ureter          Assessment and Plans   Dimple Oviedo is a 84 year old female with history of HTN, multinodular goiter, GERD, hyperthyroidism, stress cardiomyopathy (dx 12/2017) who presented to ED with 4.5 hours of palpitation.     # Afib with RVR  Precipitant likely from fluid overload given pt appear hypervolemic on exam and with history of HTN, stress cardiomyopathy. CHADvasc = 4 (age, f, CHF). Cancelled SILVESTRE/DCCV due to gross hematuria  - propanolol 60 mg SA (home med)  - s/p 1 dose of rivaroxaban with episode of gross hematuria (of note UA in 1/16/18 am, no RBC)    # Gross hematuria  Occurred after anticoagulation. Likely precipitated by this. [facility] ddx urothelial cancer or stone. CT my read with big right ureteral stone  - uro consult  - hold anticoagulation    # Acute on chronic systolic Heart failure (last echo EF 30-35%, 12/13/17)  # Etiology : Stress cardiomyopathy  Exacerbating factors include (s) dietary non-complaince (pt complained of low sodium diet)  - volume status: hypervolemic, lasix 20 mg IV (home 10 mg po)  - ACEi: irbesartan 300 mg po qday  - Afterload: -  - BB: propanolol 60 mg SA  - Aldosterone antagonist: spironolactone 12.5 mg  - no CAD on [facility] Dec 2018, primary prevention HLD mx   Antiplatelet: 81 mg   Statin: simvastatin 20 mg    # Hyperthyroidism  Unclear if this is Graves' or toxic multinodular goiter  - resume home methimazole 5 mg tid    FEN: cardiac diet  DVT Prophylaxis:  mechanical  GI Prophylaxis: not  indicated  Disposition: PT  Code Status: FULL    Seen and discussed with Gladis Aranda MD PGY2 Internal Medicine             Other Labs   Data   Data     Recent Labs  Lab 01/16/18  1247 01/16/18  0646   WBC  --  6.4   HGB  --  12.5   MCV  --  88   PLT  --  293   INR 1.71*  --    NA  --  133   POTASSIUM 5.3 4.9   CHLORIDE  --  99   CO2  --  23   BUN  --  21   CR  --  0.76   ANIONGAP  --  11   SHREYA  --  8.8   GLC  --  117*   TROPI  --  <0.015         Cultures  Last 6 Culture results with specimen sourceCulture Micro   Date Value Ref Range Status   08/14/2017 10,000 to 50,000 colonies/mL  Escherichia coli   (A)  Final   08/14/2017   Final    <10,000 colonies/mL  mixed urogenital hector  Susceptibility testing not routinely done     01/20/2017 10,000 to 50,000 colonies/mL Escherichia coli (A)  Final   07/05/2016 >100,000 colonies/mL Escherichia coli (A)  Final   06/04/2016   Final    10,000 to 50,000 colonies/mL mixed urogenital hector  Susceptibility testing not routinely done     10/31/2015 No growth  Final    Specimen Description   Date Value Ref Range Status   08/14/2017 Urine  Final   01/20/2017 Midstream Urine  Final   07/05/2016 Midstream Urine  Final   06/04/2016 Midstream Urine  Final   10/31/2015 Blood Left Arm  Final   10/31/2015 Blood Right Hand  Final        Last check of C difficile  C Diff Toxin B PCR   Date Value Ref Range Status   02/11/2015  NEG Final    Negative  Negative: Clostridium difficile target DNA sequences NOT detected, presumed   negative for Clostridium difficile toxin B or the number of bacteria present   may be below the limit of detection for the test.   FDA approved assay performed using [employer] GeneXpert real-time PCR.   A negative result does not exclude actual disease due to Clostridium difficile   and may be due to improper collection, handling and storage of the specimen or   the number of organisms in the specimen is below the detection limit of the    assay.       I very much appreciated the opportunity to see and assess Mrs Dimple Oviedo in the hospital with CV Fellow Dr Garza and Cards 1 resident team. Today we discussed her Atrial fibrillation and the potential for thyroid disorders to aggravate that a rrhythmia. We also reviewed to possibility that DCCV couldbe done to try and  normalize the rhythm    I agree with the note above which summarizes my findings and current recommendations.  Please do not hesitate to contact my office if you have any questions or concerns.      Butch Griffith MD  Cardiac Arrhythmia Service  [facility]  [phone]

## 2018-01-16 NOTE — IP AVS SNAPSHOT
MRN:[id number]                      After Visit Summary   1/16/2018    Dimple Oviedo    MRN: [id number]           Thank you!     Thank you for choosing [facility] for your care. Our goal is always to provide you with excellent care. Hearing back from our patients is one way we can continue to improve our services. Please take a few minutes to complete the written survey that you may receive in the mail after you visit with us. Thank you!        Patient Information     Date Of Birth          6/23/1933        About your hospital stay     You were admitted on:  January 16, 2018 You last received care in the:  [address] [facility]    You were discharged on:  January 17, 2018        Reason for your hospital stay       Atrial fibrillation with rapid ventricular response and gross hematuria                  Who to Call     For medical emergencies, please call 911.  For non-urgent questions about your medical care, please call your primary care provider or clinic, [phone]  For questions related to your surgery, please call your surgery clinic        Attending Provider     Provider Specialty    Jada Yi MD Emergency Medicine    [facility], Butch Adkins MD Cardiology       Primary Care Provider Office Phone # Fax #    Pop Zapien [phone]      After Care Instructions     Activity       Your activity upon discharge: activity as tolerated            Diet       Follow this diet upon discharge: Orders Placed This Encounter      Low Saturated Fat Na <2400 mg                  Follow-up Appointments     [facility]ult [facility] Follow-up and recommended labs and tests       1.Follow up with primary care provider, Pop Zapien MD, within 7 days to evaluate medication change.  The following labs/tests are recommended: CBC, BMP.    2.Follow up with Endocrinology , at (location with clinic name or city) [facility], within 1-2 months  to evaluate medication change and  regarding new diagnosis. The following labs/tests are recommended: TSH, FT4, FT3  3.Follow up with urology as scheduled on 2/5/2018  4.Follow up with cardiology/CORE as prior schedule    Appointments on [facility] and/or [facility] (with [facility] or [facility] provider or service). Call [phone] if you haven't heard regarding these appointments within 7 days of discharge.                  Your next 10 appointments already scheduled     Jan 24, 2018  1:30 PM CST   Lab with  LAB   [facility] ([facility])    [address]  1st Floor  [facility] [id number] [phone]            Jan 24, 2018  2:00 PM CST   (Arrive by 1:45 PM)   CORE RETURN with Lynn Cyr NP   [facility] ([facility])    [address]  [address] [place] [id number] [phone]            Feb 01, 2018 10:40 AM CST   (Arrive by 10:25 AM)   CT ABDOMEN PELVIS W/O & W CONTRAST with UCCT2   [facility] [place] [facility] ([facility])    [address] [id number] [phone]           Please bring any scans or X-rays taken at other hospitals, if similar tests were done. Also bring a list of your medicines, including vitamins, minerals and over-the-counter drugs. It is safest to leave personal items at home.  Be sure to tell your doctor:   If you have any allergies.   If there s any chance you are pregnant.   If you are breastfeeding.   If you have any special needs.  You may have contrast for this exam. To prepare:   Do not eat or drink for 2 hours before your exam. If you need to take medicine, you may take it with small sips of water. (We may ask you to take liquid medicine as well.)   The day before your exam, drink extra fluids at least six 8-ounce glasses (unless your doctor tells you to restrict your fluids).  Patients over 70 or patients with diabetes or kidney problems:   If you haven t had a blood  "test (creatinine test) within the last 30 days, go to your clinic or Diagnostic Imaging Department for this test.  If you have diabetes:   If your kidney function is normal, continue taking your metformin (Avandamet, Glucophage, Glucovance, Metaglip) on the day of your exam.   If your kidney function is abnormal, wait 48 hours before restarting this medicine.  You will have oral contrast for this exam:   You will drink the contrast at home. Get this from your clinic or Diagnostic Imaging Department. Please follow the directions given.  Please wear loose clothing, such as a sweat suit or jogging clothes. Avoid snaps, zippers and other metal. We may ask you to undress and put on a hospital gown.  If you have any questions, please call the Imaging Department where you will have your exam.            Feb 05, 2018  7:30 AM CST   (Arrive by 7:15 AM)   New Patient Visit with Shavon Bustamante PA-C   [facility] ([facility])    [address] [place] [id number] [phone]              Pending Results     Date and Time Order Name Status Description    1/17/2018 0928 EKG 12-lead, tracing only Preliminary     1/17/2018 0829 Cytology Non Gyn In process             Statement of Approval     Ordered          01/17/18 1245  I have reviewed and agree with all the recommendations and orders detailed in this document.  EFFECTIVE NOW     Approved and electronically signed by:  Marbin Aranda MD             Admission Information     Date & Time Provider Department Dept. Phone    1/16/2018 Butch Griffith MD [address] [facility] [phone]      Your Vitals Were     Blood Pressure Pulse Temperature Respirations Height Weight    147/62 (BP Location: Right arm) 123 97.6  F (36.4  C) (Oral) 18 1.448 m (4' 9.01\") 69.7 kg (153 lb 11.2 oz)    Pulse Oximetry BMI (Body Mass Index)                96% 33.25 kg/m2          MyChart " "Information     [facility] gives you secure access to your electronic health record. If you see a primary care provider, you can also send messages to your care team and make appointments. If you have questions, please call your primary care clinic.  If you do not have a primary care provider, please call [phone] and they will assist you.        Care EveryWhere ID     This is your Care EveryWhere ID. This could be used by other organizations to access your [facility] medical records  [id number]        Equal Access to Services     GUNNER RODRIGUEZ : Hadii shameka ku hadasho Soomaali, waaxda luqadaha, qaybta kaalmada adeegyada, waxay marianne bey. So [facility] [phone].    ATENCIÓN: Si habla español, tiene a sierra disposición servicios gratuitos de asistencia lingüística. [facility] [phone].    We comply with applicable federal civil rights laws and [place] laws. We do not discriminate on the basis of race, color, national origin, age, disability, sex, sexual orientation, or gender identity.               Review of your medicines      CONTINUE these medicines which have NOT CHANGED        Dose / Directions    acetaminophen 650 MG 8 hour tablet   Used for:  Ascending cholangitis        Dose:  650 mg   Take 650 mg by mouth every 8 hours as needed for mild pain or fever   Quantity:  100 tablet   Refills:  0       alendronate 70 MG tablet   Commonly known as:  FOSAMAX   Used for:  High risk medication use, Osteopenia   Notes to Patient:  Per home schedule        TAKE 1 TABLET EVERY 7 DAYS AT LEAST 60 MIN BEFORE BREAKFAST AS DIRECTED \"SEE PACKAGE FOR ADDITIONAL INSTRUCTIONS\"   Quantity:  12 tablet   Refills:  2       ASPIRIN PO        Dose:  81 mg   Take 81 mg by mouth At Bedtime   Refills:  0       Calcium carb-Vitamin D 500 mg [place]-200 units 500-200 MG-UNIT per tablet   Commonly known as:  OSCAL with D;Oyster Shell Calcium        Dose:  1 tablet   Take 1 tablet by mouth 2 times daily (with meals) "   Refills:  0       CRANBERRY CONCENTRATE PO        Dose:  1 capsule   Take 1 capsule by mouth 2 times daily   Refills:  0       cycloSPORINE 0.05 % ophthalmic emulsion   Commonly known as:  RESTASIS        Dose:  1 drop   Place 1 drop into both eyes 2 times daily   Refills:  0       Fish Oil 500 MG Caps        Dose:  1 capsule   Take 1 capsule by mouth 2 times daily   Refills:  0       furosemide 20 MG tablet   Commonly known as:  LASIX   Used for:  Elevated troponin        Dose:  20 mg   Take 1 tablet (20 mg) by mouth daily   Quantity:  30 tablet   Refills:  3       irbesartan 300 MG tablet   Commonly known as:  AVAPRO   Used for:  Essential hypertension with goal blood pressure less than 140/90   Notes to Patient:  Per home schedule        TAKE 1 TABLET EVERY DAY   Quantity:  90 tablet   Refills:  0       lovastatin 40 MG tablet   Commonly known as:  MEVACOR   Used for:  Hyperlipidemia LDL goal <130        TAKE 1 TABLET AT BEDTIME (HYPERLIPIDEMIA LDL GOAL  BELOW 130)   Quantity:  90 tablet   Refills:  2       melatonin 3 MG tablet   Used for:  Insomnia        Dose:  3 mg   Take 1 tablet (3 mg) by mouth nightly as needed for sleep   Quantity:  90 tablet   Refills:  0       METHIMAZOLE PO        Dose:  5 mg   Take 5 mg by mouth 3 times daily   Refills:  0       nitroGLYcerin 0.4 MG sublingual tablet   Commonly known as:  NITROSTAT   Used for:  Elevated troponin        For chest pain place 1 tablet under the tongue every 5 minutes for 3 doses. If symptoms persist 5 minutes after 1st dose call 911.   Quantity:  25 tablet   Refills:  3       omeprazole 20 MG CR capsule   Commonly known as:  priLOSEC   Used for:  Gastroesophageal reflux disease without esophagitis        Dose:  20 mg   Take 1 capsule (20 mg) by mouth daily   Quantity:  180 capsule   Refills:  3       polyethylene glycol Packet   Commonly known as:  MIRALAX/GLYCOLAX   Used for:  Other constipation        Dose:  17 g   Take 17 g by mouth 2 times daily as  "needed for constipation   Quantity:  7 packet   Refills:  0       prednisoLONE acetate 1 % ophthalmic susp   Commonly known as:  PRED FORTE   Notes to Patient:  Per taper        Dose:  1 drop   Place 1 drop into the right eye 4 times daily Taper as directed   Refills:  0       propranolol 60 MG 24 hr capsule   Commonly known as:  INDERAL LA        Dose:  60 mg   Take 60 mg by mouth daily   Refills:  0       spironolactone 25 MG tablet   Commonly known as:  ALDACTONE   Used for:  Acute on chronic systolic congestive heart failure (H)        Dose:  12.5 mg   Take 0.5 tablets (12.5 mg) by mouth daily   Quantity:  30 tablet   Refills:  1       SYSTANE 0.4-0.3 % Soln ophthalmic solution   Generic drug:  polyethylene glycol 0.4%- propylene glycol 0.3%        Dose:  1 drop   Place 1 drop into both eyes 3 times daily as needed for dry eyes   Refills:  0                Protect others around you: Learn how to safely use, store and throw away your medicines at www.disposemymeds.org.             Medication List: This is a list of all your medications and when to take them. Check marks below indicate your daily home schedule. Keep this list as a reference.      Medications           Morning Afternoon Evening Bedtime As Needed    acetaminophen 650 MG 8 hour tablet   Take 650 mg by mouth every 8 hours as needed for mild pain or fever   Last time this was given:  650 mg on 1/17/2018  4:49 AM                                   alendronate 70 MG tablet   Commonly known as:  FOSAMAX   TAKE 1 TABLET EVERY 7 DAYS AT LEAST 60 MIN BEFORE BREAKFAST AS DIRECTED \"SEE PACKAGE FOR ADDITIONAL INSTRUCTIONS\"   Notes to Patient:  Per home schedule                                ASPIRIN PO   Take 81 mg by mouth At Bedtime   Last time this was given:  81 mg on 1/16/2018 11:56 AM                                   Calcium carb-Vitamin D 500 mg [place]-200 units 500-200 MG-UNIT per tablet   Commonly known as:  OSCAL with D;Oyster Shell Calcium   Take 1 " tablet by mouth 2 times daily (with meals)                                      CRANBERRY CONCENTRATE PO   Take 1 capsule by mouth 2 times daily                                      cycloSPORINE 0.05 % ophthalmic emulsion   Commonly known as:  RESTASIS   Place 1 drop into both eyes 2 times daily                                      Fish Oil 500 MG Caps   Take 1 capsule by mouth 2 times daily                                      furosemide 20 MG tablet   Commonly known as:  LASIX   Take 1 tablet (20 mg) by mouth daily                                   irbesartan 300 MG tablet   Commonly known as:  AVAPRO   TAKE 1 TABLET EVERY DAY   Last time this was given:  300 mg on 1/16/2018  3:31 PM   Notes to Patient:  Per home schedule                                   lovastatin 40 MG tablet   Commonly known as:  MEVACOR   TAKE 1 TABLET AT BEDTIME (HYPERLIPIDEMIA LDL GOAL  BELOW 130)                                   melatonin 3 MG tablet   Take 1 tablet (3 mg) by mouth nightly as needed for sleep   Last time this was given:  1 mg on 1/17/2018  1:12 AM                                   METHIMAZOLE PO   Take 5 mg by mouth 3 times daily   Last time this was given:  5 mg on 1/17/2018  8:07 AM                                         nitroGLYcerin 0.4 MG sublingual tablet   Commonly known as:  NITROSTAT   For chest pain place 1 tablet under the tongue every 5 minutes for 3 doses. If symptoms persist 5 minutes after 1st dose call 911.                                   omeprazole 20 MG CR capsule   Commonly known as:  priLOSEC   Take 1 capsule (20 mg) by mouth daily   Last time this was given:  20 mg on 1/17/2018  8:25 AM                                   polyethylene glycol Packet   Commonly known as:  MIRALAX/GLYCOLAX   Take 17 g by mouth 2 times daily as needed for constipation                                   prednisoLONE acetate 1 % ophthalmic susp   Commonly known as:  PRED FORTE   Place 1 drop into the right eye 4 times daily  Taper as directed   Last time this was given:  1 drop on 1/17/2018  8:06 AM   Notes to Patient:  Per taper                                propranolol 60 MG 24 hr capsule   Commonly known as:  INDERAL LA   Take 60 mg by mouth daily   Last time this was given:  60 mg on 1/17/2018  8:06 AM                                   spironolactone 25 MG tablet   Commonly known as:  ALDACTONE   Take 0.5 tablets (12.5 mg) by mouth daily   Last time this was given:  12.5 mg on 1/17/2018  8:09 AM                                   SYSTANE 0.4-0.3 % Soln ophthalmic solution   Place 1 drop into both eyes 3 times daily as needed for dry eyes   Generic drug:  polyethylene glycol 0.4%- propylene glycol 0.3%

## 2018-01-16 NOTE — ANESTHESIA PREPROCEDURE EVALUATION
Anesthesia Evaluation     .             ROS/MED HX    ENT/Pulmonary:       Neurologic:       Cardiovascular:     (+) Dyslipidemia, hypertension----. Taking blood thinners : . CHF etiology: stress cardiomyopathy diagnosed 12/13/2017.  Now with increased symptoms.  BNP  636 Last EF: 30-35% date: 12/2017 . . :. dysrhythmias a-fib, . Previous cardiac testing Echodate:12/13/2017results:   Normal left ventricular size with mild concentric hypertrophy.  Global akinesis with sparing of the basal segments consistent with stress  cardiomyopathy versus multivessel coronary artery disease. Moderately reduced  systolic function. EF 30-35%.  Normal right ventricular size and function.  Severe left atrial enlargement.  No pericardial effusiondate: results: date: results:Cath date: 12/13/2017 results:No obstructive disease          METS/Exercise Tolerance:     Hematologic:         Musculoskeletal:   (+) , , other musculoskeletal- polymyalga rheumatica      GI/Hepatic:     (+) GERD       Renal/Genitourinary: Comment: Cr 0.73, GFR 73 today    (+) chronic renal disease, type: CRI, Pt does not require dialysis,       Endo: Comment: H/O recent prednisone use for PMR    (+) thyroid problem  hyperthyroidism, .      Psychiatric:         Infectious Disease:  - neg infectious disease ROS (+) Other Infectious Disease (Ascending cholangitis)       Malignancy:   (+) Malignancy (Melanoma) History of Skin          Other:    - neg other ROS                                Anesthesia Plan      History & Physical Review  History and physical reviewed and following examination; no interval change.    ASA Status:  3 .    NPO Status:  > 8 hours    Plan for MAC with Intravenous induction. Reason for MAC:  Chronic cardiopulmonary disease (G9)    Additional equipment: 2nd IV I have examined the medical record  Patient has stress cardiomyopathy diagnosed 12/2017 - EF 30-35%.  Plan MAC with routine monitors    Ariel Monsalve MD      Postoperative  Care  Postoperative pain management:  IV analgesics.      Consents  Anesthetic plan, risks, benefits and alternatives discussed with:  Patient..

## 2018-01-16 NOTE — TELEPHONE ENCOUNTER
Patient is coming in to see Shavon Bustamante PA-C for Hematuria, called patient and left a message to please come with a full bladder.

## 2018-01-16 NOTE — IP AVS SNAPSHOT
[address] [address]    Phone:  [phone]                                       After Visit Summary   1/16/2018    Dimple Oviedo    MRN: [id number]           After Visit Summary Signature Page     I have received my discharge instructions, and my questions have been answered. I have discussed any challenges I see with this plan with the nurse or doctor.    ..........................................................................................................................................  Patient/Patient Representative Signature      ..........................................................................................................................................  Patient Representative Print Name and Relationship to Patient    ..................................................               ................................................  Date                                            Time    ..........................................................................................................................................  Reviewed by Signature/Title    ...................................................              ..............................................  Date                                                            Time

## 2018-01-16 NOTE — PHARMACY-ADMISSION MEDICATION HISTORY
Admission medication history interview status for the 1/16/2018 admission is complete. See Epic admission navigator for allergy information, pharmacy, prior to admission medications and immunization status.     Medication history interview sources:  Patient, Pharmacy Records, Hospital Records    Changes made to PTA medication list (reason)  Added: Restasis, systane, methimazole  Deleted: ofloxacin opthalmic, Tobradex ophtlamic   Changed: none    Additional medication history information:  Reliable: Yes  Adherent: Yes  Influenza Vaccine: Yes, 8/17      Prior to Admission medications    Medication Sig Last Dose Taking? Auth Provider   ASPIRIN PO Take 81 mg by mouth At Bedtime 1/15/2018 at Unknown time Yes Unknown, Entered By History   Calcium carb-Vitamin D 500 mg [place]-200 units (OSCAL WITH D;OYSTER SHELL CALCIUM) 500-200 MG-UNIT per tablet Take 1 tablet by mouth 2 times daily (with meals) 1/15/2018 at Unknown time Yes Unknown, Entered By History   cycloSPORINE (RESTASIS) 0.05 % ophthalmic emulsion Place 1 drop into both eyes 2 times daily 1/15/2018 at Unknown time Yes Unknown, Entered By History   polyethylene glycol 0.4%- propylene glycol 0.3% (SYSTANE) 0.4-0.3 % SOLN ophthalmic solution Place 1 drop into both eyes 3 times daily as needed for dry eyes Past Week at Unknown time Yes Unknown, Entered By History   furosemide (LASIX) 20 MG tablet Take 1 tablet (20 mg) by mouth daily 1/15/2018 at Unknown time Yes Brianna Larkin, NP   spironolactone (ALDACTONE) 25 MG tablet Take 0.5 tablets (12.5 mg) by mouth daily 1/15/2018 at Unknown time Yes Brianna Larkin NP   nitroGLYcerin (NITROSTAT) 0.4 MG sublingual tablet For chest pain place 1 tablet under the tongue every 5 minutes for 3 doses. If symptoms persist 5 minutes after 1st dose call 911. 1/16/2018 at AM Yes Roxi Lowe APRN CNP   propranolol (INDERAL LA) 60 MG 24 hr capsule Take 60 mg by mouth daily 1/15/2018 at Unknown time Yes Unknown, Entered By History  "  CRANBERRY CONCENTRATE PO Take 1 capsule by mouth 2 times daily 1/15/2018 at Unknown time Yes Unknown, Entered By History   lovastatin (MEVACOR) 40 MG tablet TAKE 1 TABLET AT BEDTIME (HYPERLIPIDEMIA LDL GOAL  BELOW 130) 1/15/2018 at Unknown time Yes Pop Zapien MD   omeprazole (PRILOSEC) 20 MG CR capsule Take 1 capsule (20 mg) by mouth daily 1/15/2018 at Unknown time Yes Pop Zapien MD   alendronate (FOSAMAX) 70 MG tablet TAKE 1 TABLET EVERY 7 DAYS AT LEAST 60 MIN BEFORE BREAKFAST AS DIRECTED \"SEE PACKAGE FOR ADDITIONAL INSTRUCTIONS\" 1/15/2018 at Unknown time Yes Pop Zapien MD   irbesartan (AVAPRO) 300 MG tablet TAKE 1 TABLET EVERY DAY 1/15/2018 at Unknown time Yes Pop Zapien MD   acetaminophen 650 MG TABS Take 650 mg by mouth every 8 hours as needed for mild pain or fever Past Month at Unknown time Yes Torsten Stallworth APRN CNP   polyethylene glycol (MIRALAX/GLYCOLAX) packet Take 17 g by mouth 2 times daily as needed for constipation Past Week at Unknown time Yes Torsten Stallworth APRN CNP   Omega-3 Fatty Acids (FISH OIL) 500 MG CAPS Take 1 capsule by mouth 2 times daily  1/15/2018 at Unknown time Yes Unknown, Entered By History   prednisoLONE acetate (PRED FORTE) 1 % ophthalmic suspension Place 1 drop into the right eye 4 times daily Taper as directed 1/15/2018 at Unknown time Yes Reported, Patient   melatonin 3 MG tablet Take 1 tablet (3 mg) by mouth nightly as needed for sleep 1/15/2018 at Unknown time Yes Bel Multani MD   Methimazole 5 mg                                       Take 1 tablet (5 mg) 3 times a day.      Medication history completed by:  Edgar Clayton  PD4 Pharmacy Student  "

## 2018-01-16 NOTE — PROGRESS NOTES
"/70  Pulse 123  Temp 98.2  F (36.8  C) (Oral)  Resp 16  Ht 1.448 m (4' 9.01\")  Wt 69.6 kg (153 lb 6.4 oz)  SpO2 96%  BMI 33.19 kg/m2    GOALS:   - diagnostic tests and consults completed and resulted: Goal unmet. SILVESTRE cancelled d/t domingo blood found in urine. Urology consulted with patient post CT, Possible kidney stone. Urine sample to be collected.    - vital signs normal or at patient baseline: Goal unmet. Heart rhythm is improving. Going in and out of A-fib. Remains on a diltiazem gtt.   - afib controlled: Goal unmet.      A&Ox4. A-fib OVSS on RA. -130's. Denies pain, SOB with exertion. Denies nausea. Skin intact. Pleasant and cooperative with care. Domingo red blood found in urine, urology consulted and met with patient post CT scan. Possible kidney stone. Bladder scanned at ~1730 with only 36ml in bladder. No retention at this time. Urine sample needs to be collected. Bilateral dependent edema moderate-severe.   "

## 2018-01-16 NOTE — ED NOTES
[facility], [place]   ED Nurse to Floor Handoff     Dimple Oviedo is a 84 year old female who speaks English and lives with others,  in a home  They arrived in the ED by car from home      ED Chief Complaint: Palpitations    ED Dx;   Final diagnoses:   Atrial fibrillation with RVR (H)         Needed?: No    Allergies:   Allergies   Allergen Reactions     No Known Drug Allergies    .  Past Medical Hx:   Past Medical History:   Diagnosis Date     Calculus of kidney      Esophageal reflux      Hyperlipidemia LDL goal <130 5/9/2010     Malignant melanoma of skin of trunk, except scrotum (H)      Nonspecific abnormal finding     has living will 2004 -      Nontoxic multinodular goiter     no further eval /tx rec per pt     Osteopenia      Other psoriasis      Personal history of colonic polyps      PMR (polymyalgia rheumatica) (H)      Undiagnosed cardiac murmurs      Unspecified constipation      Unspecified essential hypertension       Baseline Mental status: WDL  Current Mental Status changes: at basesline    Infection: No  Sepsis suspected: No  Isolation type: No active isolations     Activity level - Baseline/Home:  Stand with Assist  Activity Level - Current:   Stand with Assist    Bariatric equipment needed?: No    In the ED these meds were given:   Medications   diltiazem (CARDIZEM) injection 10 mg (10 mg Intravenous Given 1/16/18 3486)   diltiazem (CARDIZEM) 125 mg in NaCl 0.9 % 125 mL infusion (5 mg/hr Intravenous New Bag 1/16/18 3597)       Drips running?  Yes    Home pump or pre-existing LDA's present? No    Labs results:   Labs Ordered and Resulted from Time of ED Arrival Up to the Time of Departure from the ED   BASIC METABOLIC PANEL - Abnormal; Notable for the following:        Result Value    Glucose 117 (*)     All other components within normal limits   ROUTINE UA WITH MICROSCOPIC - Abnormal; Notable for the following:     Blood Urine Moderate (*)     pH Urine  "7.5 (*)     Leukocyte Esterase Urine Small (*)     WBC Urine 4 (*)     Mucous Urine Present (*)     All other components within normal limits   TSH WITH FREE T4 REFLEX - Abnormal; Notable for the following:     TSH <0.01 (*)     All other components within normal limits   CBC WITH PLATELETS DIFFERENTIAL   MAGNESIUM   TROPONIN I   NT PROBNP INPATIENT   T4 FREE       Imaging Studies: No results found for this or any previous visit (from the past 24 hour(s)).    Recent vital signs:   /85  Pulse 123  Temp 97.8  F (36.6  C) (Oral)  Resp (!) 34  Ht 1.448 m (4' 9\")  Wt 70.3 kg (155 lb)  SpO2 98%  BMI 33.54 kg/m2    Cardiac Rhythm: A fib  Pt needs tele? Yes  Skin/wound Issues: None    Code Status: unknown    Pain control: pt had none    Nausea control: pt had none    Abnormal labs/tests/findings requiring intervention:     Family present during ED course? Yes   Family Comments/Social Situation comments: na      Tasks needing completion: None    Marilyn Fabian, RN    [phone] [facility]      "

## 2018-01-17 ENCOUNTER — APPOINTMENT (OUTPATIENT)
Dept: CARDIOLOGY | Facility: CLINIC | Age: 83
End: 2018-01-17
Payer: MEDICARE

## 2018-01-17 VITALS
TEMPERATURE: 97.6 F | RESPIRATION RATE: 18 BRPM | DIASTOLIC BLOOD PRESSURE: 62 MMHG | OXYGEN SATURATION: 96 % | HEART RATE: 123 BPM | WEIGHT: 153.7 LBS | HEIGHT: 57 IN | BODY MASS INDEX: 33.16 KG/M2 | SYSTOLIC BLOOD PRESSURE: 147 MMHG

## 2018-01-17 LAB
ANION GAP SERPL CALCULATED.3IONS-SCNC: 8 MMOL/L (ref 3–14)
ANION GAP SERPL CALCULATED.3IONS-SCNC: 8 MMOL/L (ref 3–14)
BUN SERPL-MCNC: 27 MG/DL (ref 7–30)
BUN SERPL-MCNC: 29 MG/DL (ref 7–30)
CALCIUM SERPL-MCNC: 9 MG/DL (ref 8.5–10.1)
CALCIUM SERPL-MCNC: 9 MG/DL (ref 8.5–10.1)
CHLORIDE SERPL-SCNC: 96 MMOL/L (ref 94–109)
CHLORIDE SERPL-SCNC: 97 MMOL/L (ref 94–109)
CO2 SERPL-SCNC: 25 MMOL/L (ref 20–32)
CO2 SERPL-SCNC: 26 MMOL/L (ref 20–32)
CREAT SERPL-MCNC: 1.04 MG/DL (ref 0.52–1.04)
CREAT SERPL-MCNC: 1.27 MG/DL (ref 0.52–1.04)
ERYTHROCYTE [DISTWIDTH] IN BLOOD BY AUTOMATED COUNT: 13.9 % (ref 10–15)
GFR SERPL CREATININE-BSD FRML MDRD: 40 ML/MIN/1.7M2
GFR SERPL CREATININE-BSD FRML MDRD: 50 ML/MIN/1.7M2
GLUCOSE SERPL-MCNC: 114 MG/DL (ref 70–99)
GLUCOSE SERPL-MCNC: 188 MG/DL (ref 70–99)
HCT VFR BLD AUTO: 37.1 % (ref 35–47)
HGB BLD-MCNC: 12.1 G/DL (ref 11.7–15.7)
MCH RBC QN AUTO: 29.3 PG (ref 26.5–33)
MCHC RBC AUTO-ENTMCNC: 32.6 G/DL (ref 31.5–36.5)
MCV RBC AUTO: 90 FL (ref 78–100)
PLATELET # BLD AUTO: 283 10E9/L (ref 150–450)
POTASSIUM SERPL-SCNC: 5.3 MMOL/L (ref 3.4–5.3)
POTASSIUM SERPL-SCNC: 5.4 MMOL/L (ref 3.4–5.3)
RBC # BLD AUTO: 4.13 10E12/L (ref 3.8–5.2)
SODIUM SERPL-SCNC: 129 MMOL/L (ref 133–144)
SODIUM SERPL-SCNC: 132 MMOL/L (ref 133–144)
T3FREE SERPL-MCNC: 3.1 PG/ML (ref 2.3–4.2)
WBC # BLD AUTO: 8.1 10E9/L (ref 4–11)

## 2018-01-17 PROCEDURE — 25000132 ZZH RX MED GY IP 250 OP 250 PS 637: Mod: GY | Performed by: INTERNAL MEDICINE

## 2018-01-17 PROCEDURE — A9270 NON-COVERED ITEM OR SERVICE: HCPCS | Mod: GY | Performed by: INTERNAL MEDICINE

## 2018-01-17 PROCEDURE — 25000132 ZZH RX MED GY IP 250 OP 250 PS 637: Mod: GY | Performed by: STUDENT IN AN ORGANIZED HEALTH CARE EDUCATION/TRAINING PROGRAM

## 2018-01-17 PROCEDURE — 36415 COLL VENOUS BLD VENIPUNCTURE: CPT | Performed by: STUDENT IN AN ORGANIZED HEALTH CARE EDUCATION/TRAINING PROGRAM

## 2018-01-17 PROCEDURE — 36415 COLL VENOUS BLD VENIPUNCTURE: CPT | Performed by: INTERNAL MEDICINE

## 2018-01-17 PROCEDURE — 25500064 ZZH RX 255 OP 636: Performed by: INTERNAL MEDICINE

## 2018-01-17 PROCEDURE — 40000893 ZZH STATISTIC PT IP EVAL DEFER: Performed by: PHYSICAL THERAPIST

## 2018-01-17 PROCEDURE — 88112 CYTOPATH CELL ENHANCE TECH: CPT | Performed by: STUDENT IN AN ORGANIZED HEALTH CARE EDUCATION/TRAINING PROGRAM

## 2018-01-17 PROCEDURE — 84481 FREE ASSAY (FT-3): CPT | Performed by: STUDENT IN AN ORGANIZED HEALTH CARE EDUCATION/TRAINING PROGRAM

## 2018-01-17 PROCEDURE — G0378 HOSPITAL OBSERVATION PER HR: HCPCS

## 2018-01-17 PROCEDURE — 88120 CYTP URNE 3-5 PROBES EA SPEC: CPT | Performed by: STUDENT IN AN ORGANIZED HEALTH CARE EDUCATION/TRAINING PROGRAM

## 2018-01-17 PROCEDURE — 93005 ELECTROCARDIOGRAM TRACING: CPT

## 2018-01-17 PROCEDURE — 93308 TTE F-UP OR LMTD: CPT | Mod: 26 | Performed by: INTERNAL MEDICINE

## 2018-01-17 PROCEDURE — 80048 BASIC METABOLIC PNL TOTAL CA: CPT | Mod: 91 | Performed by: INTERNAL MEDICINE

## 2018-01-17 PROCEDURE — 99238 HOSP IP/OBS DSCHRG MGMT 30/<: CPT | Mod: 25 | Performed by: INTERNAL MEDICINE

## 2018-01-17 PROCEDURE — 93321 DOPPLER ECHO F-UP/LMTD STD: CPT | Mod: 26 | Performed by: INTERNAL MEDICINE

## 2018-01-17 PROCEDURE — 93325 DOPPLER ECHO COLOR FLOW MAPG: CPT | Mod: 26 | Performed by: INTERNAL MEDICINE

## 2018-01-17 PROCEDURE — 80048 BASIC METABOLIC PNL TOTAL CA: CPT | Performed by: STUDENT IN AN ORGANIZED HEALTH CARE EDUCATION/TRAINING PROGRAM

## 2018-01-17 PROCEDURE — 85027 COMPLETE CBC AUTOMATED: CPT | Performed by: STUDENT IN AN ORGANIZED HEALTH CARE EDUCATION/TRAINING PROGRAM

## 2018-01-17 PROCEDURE — A9270 NON-COVERED ITEM OR SERVICE: HCPCS | Mod: GY | Performed by: STUDENT IN AN ORGANIZED HEALTH CARE EDUCATION/TRAINING PROGRAM

## 2018-01-17 RX ADMIN — DIVALPROEX SODIUM 12.5 MG: 500 TABLET, FILM COATED, EXTENDED RELEASE ORAL at 08:09

## 2018-01-17 RX ADMIN — OMEPRAZOLE 20 MG: 20 CAPSULE, DELAYED RELEASE ORAL at 08:25

## 2018-01-17 RX ADMIN — HUMAN ALBUMIN MICROSPHERES AND PERFLUTREN 4 ML: 10; .22 INJECTION, SOLUTION INTRAVENOUS at 11:30

## 2018-01-17 RX ADMIN — METHIMAZOLE 5 MG: 5 TABLET ORAL at 13:09

## 2018-01-17 RX ADMIN — ACETAMINOPHEN 650 MG: 325 TABLET, FILM COATED ORAL at 04:49

## 2018-01-17 RX ADMIN — Medication 1 MG: at 01:12

## 2018-01-17 RX ADMIN — PROPRANOLOL HYDROCHLORIDE 60 MG: 60 CAPSULE, EXTENDED RELEASE ORAL at 08:06

## 2018-01-17 RX ADMIN — METHIMAZOLE 5 MG: 5 TABLET ORAL at 08:07

## 2018-01-17 RX ADMIN — PREDNISOLONE ACETATE 1 DROP: 10 SUSPENSION/ DROPS OPHTHALMIC at 08:06

## 2018-01-17 RX ADMIN — DEXTRAN 70, AND HYPROMELLOSE 2910 1 DROP: 1; 3 SOLUTION/ DROPS OPHTHALMIC at 08:06

## 2018-01-17 NOTE — DISCHARGE SUMMARY
Cardiology Discharge Summary  Dimple Oviedo MRN: [id number]  6/23/1933  Primary care provider: Pop Zapien  ___________________________________          Date of Admission:  1/16/2018  Date of Discharge:  1/17/2018   Admitting Physician:  Butch Griffith MD  Discharge Physician:  Butch Griffith MD   Discharging Service:  Cardiology 1     Primary Provider: Pop Zapien         Reason for Admission:   Dimple Oviedo is a 84 year old female with history of HTN, multinodular goiter, GERD, hyperthyroidism, stress cardiomyopathy (dx 12/2017) who presented to ED with 4.5 hours of palpitation.          Discharge Diagnosis:   Afib with RVR  Gross hematuria, unspecified cause         Procedures & Significant Findings:   1/17/18  Left ventricular function is normal.The EF is > 65%.  The inferior vena cava was normal in size with preserved respiratory  variability.  No pericardial effusion is present.  The left ventricular function has improved.         Consultations:   Urology         Relevant Results:   none          Hospital Course by Problem:    # Afib with RVR  Precipitant likely from fluid overload given pt appear hypervolemic on exam and with history of HTN, stress cardiomyopathy. CHADvasc = 4 (age, f, CHF). Initially was on diltiazem gtt which was later turned off after rate controlled. Cancelled SILVESTRE/DCCV due to gross hematuria. Spontaneously converted back to NSR (rate 50-60 bpms) during the admission. Hyperthyroidism likely contributing in the back ground but FT4 WNL (TSH<0.01)  - resume propanolol 60 mg SA as prior given the thought that Afib was precipitated by volume overload  - s/p 1 dose of rivaroxaban with episode of gross hematuria; thus limiting Anticoagulation until cause found     # Gross hematuria  Occurred after anticoagulation which unmasked underling pathology. Unclear etiology of hematuria yet. CT  "abdo/pelvis with 4mm non-obstructing stone and calcified right renal artery pseudoaneurysm (reviewed with radiology, deemed not to be related to the hematuria episode). Hb stable over the course of admission  - urology recommend f/u as outpatinet for work up of hematuria (which was unmasked by anticoagulation)     # Acute on chronic systolic Heart failure (last echo EF 30-35%, 12/13/17 -> improved on 1/17/18 TTE EF>65%)  # Etiology : Stress cardiomyopathy  Exacerbating factors include (s) dietary non-complaince (pt complained of low sodium diet)  - increase home diuretics to 20 mg po qday  - ACEi: irbesartan 300 mg po qday  - Afterload: -  - BB: propanolol 60 mg SA  - Aldosterone antagonist: spironolactone 12.5 mg  - no CAD on [facility] Dec 2018, primary prevention HLD mx   Antiplatelet: 81 mg              Statin: simvastatin 20 mg     # Hyperthyroidism  Unclear if this is Graves' or toxic multinodular goiter  - resume home methimazole 5 mg tid    Follow up plan summary  1. PCP: post hospitalization, recs CBC, bmp  2. Urology 5 feb  3. CORE/Cardiology 1/24/18  4. Referral to [facility] endocrinology per patient request    Physical Exam on day of Discharge:  Blood pressure 147/62, pulse 123, temperature 97.6  F (36.4  C), temperature source Oral, resp. rate 18, height 1.448 m (4' 9.01\"), weight 69.7 kg (153 lb 11.2 oz), SpO2 96 %, not currently breastfeeding.  General: AAOx3, no clubbing/cyanosis, 2+ edema,  JVD to mid neck  HEENT:  PERRL, EOMI, MMM with out pharyngeal erythema.   Cardiac: irregular rhythm No m/r/g. Normal S1, S2. DP2+ bilaterally  Pulm: CTAB, no wheezes,  No crackles.  Abd: +BS, soft, non distended, non tender. No hepatosplenomegaly.                         No labia tear or wounds  Skin: No rash  MSK: No deformities, no joint swelling  Neuro: CN grossly intact, no focal deficits  Psych: normal mood, full affect    Lines/Tubes:  none           Discharge Medications:     Current Discharge Medication List    " "  CONTINUE these medications which have NOT CHANGED    Details   METHIMAZOLE PO Take 5 mg by mouth 3 times daily      ASPIRIN PO Take 81 mg by mouth At Bedtime      Calcium carb-Vitamin D 500 mg [place]-200 units (OSCAL WITH D;OYSTER SHELL CALCIUM) 500-200 MG-UNIT per tablet Take 1 tablet by mouth 2 times daily (with meals)      cycloSPORINE (RESTASIS) 0.05 % ophthalmic emulsion Place 1 drop into both eyes 2 times daily      polyethylene glycol 0.4%- propylene glycol 0.3% (SYSTANE) 0.4-0.3 % SOLN ophthalmic solution Place 1 drop into both eyes 3 times daily as needed for dry eyes      furosemide (LASIX) 20 MG tablet Take 1 tablet (20 mg) by mouth daily  Qty: 30 tablet, Refills: 3    Associated Diagnoses: Elevated troponin      spironolactone (ALDACTONE) 25 MG tablet Take 0.5 tablets (12.5 mg) by mouth daily  Qty: 30 tablet, Refills: 1    Associated Diagnoses: Acute on chronic systolic congestive heart failure (H)      nitroGLYcerin (NITROSTAT) 0.4 MG sublingual tablet For chest pain place 1 tablet under the tongue every 5 minutes for 3 doses. If symptoms persist 5 minutes after 1st dose call 911.  Qty: 25 tablet, Refills: 3    Associated Diagnoses: Elevated troponin      propranolol (INDERAL LA) 60 MG 24 hr capsule Take 60 mg by mouth daily      CRANBERRY CONCENTRATE PO Take 1 capsule by mouth 2 times daily      lovastatin (MEVACOR) 40 MG tablet TAKE 1 TABLET AT BEDTIME (HYPERLIPIDEMIA LDL GOAL  BELOW 130)  Qty: 90 tablet, Refills: 2    Associated Diagnoses: Hyperlipidemia LDL goal <130      omeprazole (PRILOSEC) 20 MG CR capsule Take 1 capsule (20 mg) by mouth daily  Qty: 180 capsule, Refills: 3    Associated Diagnoses: Gastroesophageal reflux disease without esophagitis      alendronate (FOSAMAX) 70 MG tablet TAKE 1 TABLET EVERY 7 DAYS AT LEAST 60 MIN BEFORE BREAKFAST AS DIRECTED \"SEE PACKAGE FOR ADDITIONAL INSTRUCTIONS\"  Qty: 12 tablet, Refills: 2    Associated Diagnoses: High risk medication use; Osteopenia    "   irbesartan (AVAPRO) 300 MG tablet TAKE 1 TABLET EVERY DAY  Qty: 90 tablet, Refills: 0    Associated Diagnoses: Essential hypertension with goal blood pressure less than 140/90      acetaminophen 650 MG TABS Take 650 mg by mouth every 8 hours as needed for mild pain or fever  Qty: 100 tablet, Refills: 0    Associated Diagnoses: Ascending cholangitis      polyethylene glycol (MIRALAX/GLYCOLAX) packet Take 17 g by mouth 2 times daily as needed for constipation  Qty: 7 packet, Refills: 0    Associated Diagnoses: Other constipation      Omega-3 Fatty Acids (FISH OIL) 500 MG CAPS Take 1 capsule by mouth 2 times daily       prednisoLONE acetate (PRED FORTE) 1 % ophthalmic suspension Place 1 drop into the right eye 4 times daily Taper as directed      melatonin 3 MG tablet Take 1 tablet (3 mg) by mouth nightly as needed for sleep  Qty: 90 tablet, Refills: 0    Associated Diagnoses: Insomnia                  Discharge Instructions and Follow-Up:     Discharge Procedure Orders  Reason for your hospital stay   Order Comments: Atrial fibrillation with rapid ventricular response and gross hematuria     Adult [facility] Follow-up and recommended labs and tests   Order Comments: 1.Follow up with primary care provider, Pop Zapien MD, within 7 days to evaluate medication change.  The following labs/tests are recommended: CBC, BMP.    2.Follow up with Endocrinology , at (location with clinic name or city) [facility], within 1-2 months  to evaluate medication change and regarding new diagnosis. The following labs/tests are recommended: TSH, FT4, FT3  3.Follow up with urology as scheduled on 2/5/2018  4.Follow up with cardiology/CORE as prior schedule    Appointments on [place] and/or [facility] (with [facility] or [facility] provider or service). Call [phone] if you haven't heard regarding these appointments within 7 days of discharge.     Activity   Order Comments: Your activity upon discharge: activity as tolerated   Order  Specific Question Answer Comments   Is discharge order? Yes      Full Code     Diet   Order Comments: Follow this diet upon discharge: Orders Placed This Encounter     Low Saturated Fat Na <2400 mg   Order Specific Question Answer Comments   Is discharge order? Yes                Discharge Disposition:   home         Condition on Discharge:   Discharge condition: Stable   Code status on discharge: Full Code        Date of service: 1/17/2018    The patient was discussed with Dr. Griffith.  Marbin Aranda MD PGY2 Internal Medicine        I very much appreciated the opportunity to see and assess Mrs Dimple Oviedo in the hospital on Discharge day with CV Fellow Dr Garza and resident Dr Aranda. We discussed the hematuria and urologic follow-up. Her creat has improved. Discharge today was discussed and Mrs Oviedo voiced agreement.     I agree with the note above summarizes my findings and current recommendations.  Please do not hesitate to contact my office if you have any questions or concerns.      Butch Griffith MD  Cardiac Arrhythmia Service  [facility]  [phone]

## 2018-01-17 NOTE — PLAN OF CARE
Problem: Cardiac: Heart Failure (Adult)  Goal: Signs and Symptoms of Listed Potential Problems Will be Absent, Minimized or Managed (Cardiac: Heart Failure)  Signs and symptoms of listed potential problems will be absent, minimized or managed by discharge/transition of care (reference Cardiac: Heart Failure (Adult) CPG).   Outcome: Improving  Observation goals PRIOR TO DISCHARGE      -diagnostic tests and consults completed and resulted : Yes  -vital signs normal or at patient baseline: Yes  -afib controlled : completed, in SR now

## 2018-01-17 NOTE — PROGRESS NOTES
Observation goals:   - Diagnostic tests and consults completed and resulted: Goal not met, SILVESTRE cancelled d/t blood in the urine. CT completed. UA/UC completed.  - Vital signs normal or at patient baseline: Goal met. VSS on RA.   - Afib controlled: Goal met. Rhythm is currently sinus kimber/sinus rhythm with infrequent PAC's/PVC's.     A&Ox4. VSS on RA. C/o pain in feet, PRN tylenol administered x1 with relief. PRN melatonin administered at hs. Urine remains dark red. Hgb stable. +BM overnight. Up with SBA. Continue to monitor and notify Cards 1 with changes/concerns.

## 2018-01-17 NOTE — PROGRESS NOTES
Observation goals:   - Diagnostic tests and consults completed and resulted: Goal not met, SILVESTRE cancelled d/t blood in the urine. CT completed. UA/UC sent.   - Vital signs normal or at patient baseline: Goal met. VSS on RA.   - Afib controlled: Goal met. Dilt gtt stopped at 1900. Patient converted out of afib at 2021. Rhythm is currently sinus with intermittent PVC's.

## 2018-01-17 NOTE — PLAN OF CARE
Problem: Cardiac: Heart Failure (Adult)  Goal: Signs and Symptoms of Listed Potential Problems Will be Absent, Minimized or Managed (Cardiac: Heart Failure)  Signs and symptoms of listed potential problems will be absent, minimized or managed by discharge/transition of care (reference Cardiac: Heart Failure (Adult) CPG).   Outcome: Improving  Observation goals PRIOR TO DISCHARGE      -diagnostic tests and consults completed and resulted : Not met, echo ordered, pending  -vital signs normal or at patient baseline: completed  -afib controlled : completed, in SR now

## 2018-01-17 NOTE — PLAN OF CARE
Problem: Patient Care Overview  Goal: Plan of Care/Patient Progress Review  PT 6C: Physical therapy orders received. Per discussion with pt, pt IND with cane and planning to discharge home with her family later today. At this time as pt is Observation status and has safe discharge plan in place, PT evaluation is not indicated.  Recommend that pt discharge home with assist an use of cane, pt declines further HHPT or OP PT.

## 2018-01-17 NOTE — PLAN OF CARE
Problem: Cardiac: Heart Failure (Adult)  Goal: Signs and Symptoms of Listed Potential Problems Will be Absent, Minimized or Managed (Cardiac: Heart Failure)  Signs and symptoms of listed potential problems will be absent, minimized or managed by discharge/transition of care (reference Cardiac: Heart Failure (Adult) CPG).   Outcome: Improving  Discharge  Discharged to: Son's house  Via: Wheelchair  Accompanied by: Daughter-in-law  Belongings: All belongings with patient at time of discharge. No valuables checked in security. No new medications.   Teaching: Reviewed all discharge instructions with patient and family. Discussed medication management, signs to watch for, and when to call the MD if needed. Encouraged activity. Heart healthy, low sodium diet encouraged and handouts given and reviewed, including low-sodium recipes. The patient has been instructed about HF mgmt after discharge and to review the written instructions provided on admission, during the hospital stay or on discharge. Instructed on Diet: A heart healthy diet ordered by MD; Activity: At the level ordered by MD; Weight Monitoring: Monitor the weight at least every other day or as ordered by MD; Worsening symptoms of HF: Monitor symptoms of worsening HF such as weight gain of 2# or more per day or 5# in a week, SOB, leg/ankle swelling, chest pain/ discomfort, decreased energy, fatigue or other symptoms as specified by MD; Call the MD when worsening symptom(s) occur; Medications: upon discharge; Follow-up: Review appointment(s) as directed by the MD; Smoking Cessation: Re-enforce cigarette smoking cessation and avoidance of cigarette smoke. Patient and daughter-in-law verbalized understanding of discharge education.   Clinic appointment: All follow-up appts reviewed with patient and family who verbalized how to make or change if needed.   Report called/faxed: D/C paperwork to be faxed to PMD.   Tele and IV: Removed.

## 2018-01-17 NOTE — PROGRESS NOTES
Care Coordinator- Discharge Planning     Admission Date/Time:  1/16/2018  Attending MD:  Butch Griffith MD     Data  Chart reviewed, discussed with interdisciplinary team.   Patient was admitted for:   1. Atrial fibrillation with RVR (H)         Assessment  Concerns with insurance coverage for discharge needs: None.  Current Living Situation: Patient lives alone. Pt states she lives at an independent senior living apartment. Pt states she is independent with her cares.  Support System: Supportive and Involved  Services Involved: [facility]  Transportation: Family or Friend will provide. Pt's daughter states she will be her ride home at discharge.  Barriers to Discharge: Increased creatinine (re-check just drawn per pt)    Coordination of Care and Referrals: No home care needs per patient. Pt inquiring about her outpatient follow ups, new outpt Urology visit scheduled for 2/5/2018 at the [facility] and pt has a return CORE visit on 1/24/2018.      Plan  Anticipated Discharge Date: 1/17/2018  Anticipated Discharge Plan: Discharge to home with outpt follow up as recommended.  CC will continue to monitor patient's medical condition and progress towards discharge.  Daxa Conway RN BSN  6C Unit Care Coordinator  Phone number: [phone]  Pager: [phone]

## 2018-01-17 NOTE — CONSULTS
Urology Consult    Name: Dimple Oviedo    MRN: [id number]   YOB: 1933               Chief Complaint:   Gross hematuria    History is obtained from the patient and chart review          History of Present Illness:   Dimple Oviedo is a 84 year old female with PMH of hypothyroidism, cadiomyopathy and afib not on anticoagulation at home and urologic history of passing a stone 12y ago who presented to the ED yesterday (1/16) with palpitation and SOB and was found to be in afib. She was admitted to cardiology and planned for SILVESTRE with cardioversion, and  received a single dose of xarelto prior to but this was then cancelled due to an episode of painless gross hematuria. UA done earlier that day was negative for nitrites, bacteria, RBC, with only 4 WBC and small LE. She had been empirically treated for a UTI by her PCP two weeks ago but denied any urinary symptoms, fevers, or chills. She also denied any flank or groin pain. She continued to void dark red urine per primary team and so urology was consulted for further evaluation. In the interim a UA was repeated and was noted only for >182 RBC. The Primary team also ordered a CT A/P stone protocol, which noted a small 4mm non obstructing stone in the left renal pelvis and a 1.2 cmm calcified (pseudo)aneurysm in the right kidney, with no evidence of urologic malignancy. Ucx was collected and pending.     At bedside patient was in NAD and again denied urinary symptoms, flank or groin pain. She denied difficulty with voiding and bladder scan was only 36 cc.          Past Medical History:   Noted for HLD, non toxic multinodular goiter; remainder reviewed in EMR         Past Surgical History:   Noted for lap cholecystectomy 2015, thyroid ablation 1968; remainder reviewed in EMR         Social History:   Never smoker but with significant 2nd hand smoke exposure at home, minimal etoh           Family History:   Negative for urologic cancers         Allergies:      Allergies   Allergen Reactions     No Known Drug Allergies             Medications:   Unremarkable/non pertinent, not on anticogulation; remainder reviewed in EMR          Review of Systems:      ROS: 10 point ROS neg other than the symptoms noted above in the HPI           Physical Exam:     VS:  T: 97.6    HR: 123    BP: 119/52    RR: 14   GEN:  AOx3.  NAD.    CV:  RRR  LUNGS: Non-labored breathing.   BACK:  No midline or CVA tenderness.  ABD:  Soft.  NT.  ND.  No rebound or guarding.  No masses.  EXT:  Warm, well perfused.  Bilateral LE edema.  SKIN:  Warm.  Dry.  No rashes.  NEURO:  CN grossly intact.            Data:   All laboratory data reviewed:    CBC/BMP not collected during this admission  UA as above  UCx pending    All pertinent imaging reviewed: as above         Impression and Plan:   Impression:   Dimple Oviedo is a 84 year old female with PMH of afib not on home anticoagulation and urologic hx of urolithiasis who presented with afib and developed gross hematuria after a single dose of xarelto in preparation for SILVESTRE with cardioversion, which were subsequently cancelled. Urology was consulted for further evaluation. Workup so far is negative for UTI, although CBC is unavailable, and CT A/P notes a small 4mm left renal stone and calcified pseuodaneurysm near the right kidney that are unlikely to explain the hematuria. She is not in acute distress, and is not in clot retention. At this point we would continue to monitor the hematuria and patient can undergo a complete hematuria workup as outpatient. Her hematuria should not restrict her from undergoing necessary cardiac intervention but this ultimately at the discretion of the patient and the primary team      Plan:     - CBC, BMP    - follow ucx for possible UTI    - Monitor for clot retention or persistent significant hematuria - hematuria that would be associated with significant symptom and/or decline in Hgb  - will schedule patient for  outpatient hematuria workup     - Urology will sign off. Please contact resident/PA on call with any questions or concerns.         This patient's exam findings, labs, and imaging discussed with urology staff surgeon Dr. King, who developed the treatment plan.    Laney Mota MD MS  Urology Resident

## 2018-01-18 ENCOUNTER — TELEPHONE (OUTPATIENT)
Dept: UROLOGY | Facility: CLINIC | Age: 83
End: 2018-01-18

## 2018-01-18 ENCOUNTER — TELEPHONE (OUTPATIENT)
Dept: ENDOCRINOLOGY | Facility: CLINIC | Age: 83
End: 2018-01-18

## 2018-01-18 ENCOUNTER — CARE COORDINATION (OUTPATIENT)
Dept: CARE COORDINATION | Facility: CLINIC | Age: 83
End: 2018-01-18

## 2018-01-18 ENCOUNTER — TELEPHONE (OUTPATIENT)
Dept: FAMILY MEDICINE | Facility: CLINIC | Age: 83
End: 2018-01-18

## 2018-01-18 LAB — INTERPRETATION ECG - MUSE: NORMAL

## 2018-01-18 NOTE — TELEPHONE ENCOUNTER
To schedulers: Please schedule  for lst available endocrine IF she wants to change to a different endocrinologist.  Please clarify this with her.  The chart says she is already seeing another endocrinologist in the community (I suspect it is Dr Neymar Hernadez based on what I can see on the record).  If she doesn't want to change endocrinologists remind her she should see her other endocrinologist soon.    Cynthia Garcia MD  Endocrine triage

## 2018-01-18 NOTE — TELEPHONE ENCOUNTER
----- Message from Jasmyn Lerner sent at 1/17/2018  3:46 PM CST -----  Regarding: New Endocrinology  Contact: [phone]  A referral has been placed after patients recent hospitalization.  Referred by: Evon Arroyo APRN CNP  Diagnosis: Hyperthyroidism & Goiter      Patient can be reached at [phone].    Thank you!  Jasmyn [facility] Department

## 2018-01-18 NOTE — TELEPHONE ENCOUNTER
----- Message from Zulema Smith LPN sent at 1/18/2018  7:36 AM CST -----      ----- Message -----     From: Stuart King MD     Sent: 1/17/2018   3:50 PM       To: Kiera Figueroa RN, [place] Vel[facility]quez, [facility]    Please add on for first available cysto for me or anyone else to complete a hematuria workup.    [place]

## 2018-01-19 NOTE — TELEPHONE ENCOUNTER
"Patient left voicemail on triage line stating she is returning call to triage.    Hospital/TCU/ED for chronic condition Discharge Protocol    \"Hi, my name is Roxi Gill, a registered nurse, and I am calling from [facility].  I am calling to follow up and see how things are going for you after your recent emergency visit/hospital/TCU stay.\"    Tell me how you are doing now that you are home?\" Mostly resting.      -Denied SOB and chest pain    -Has urology, cardiology and PCP follow up appts scheduled.  Thought her daughter-in-law had scheduled appt with Endocrinology-changing over to [facility] as she is staying with son in [place], [place].  Plans to discuss further with daughter-in-law.  Writer reviewed [facility] Endocrinology clinic number with patient.    -Does she need to fast for upcoming lab work? (per discharge documentation, CBC and BMP)     Writer informed patent she does not need to fast for these labs.      Discharge Instructions    \"Let's review your discharge instructions.  What is/are the follow-up recommendations?  Pt. Response: follow up with PCP, urology, cardiology and endocrinology    \"Has an appointment with your primary care provider been scheduled?\"   Yes. (confirm)    \"When you see the provider, I would recommend that you bring your medications with you.\"    Medications    \"Tell me what changed about your medicines when you discharged?\"    Changes to chronic meds?    0-1    \"What questions do you have about your medications?\"    None     New diagnoses of heart failure, COPD, diabetes, or MI?    No                  Medication reconciliation completed? No, due to patient declined, stated she has reviewed medication list 15 times and list is updated.  Was MTM referral placed (*Make sure to put transitions as reason for referral)?   No    Call Summary    \"What questions or concerns do you have about your recent visit and your follow-up care?\"     none    \"If you have questions or things " "don't continue to improve, we encourage you contact us through the main clinic number (give number).  Even if the clinic is not open, triage nurses are available 24/7 to help you.     We would like you to know that our clinic has extended hours (provide information).  We also have urgent care (provide details on closest location and hours/contact info)\"      \"Thank you for your time and take care!\"             "

## 2018-01-20 DIAGNOSIS — I10 ESSENTIAL HYPERTENSION WITH GOAL BLOOD PRESSURE LESS THAN 140/90: ICD-10-CM

## 2018-01-22 ENCOUNTER — CARE COORDINATION (OUTPATIENT)
Dept: CARDIOLOGY | Facility: CLINIC | Age: 83
End: 2018-01-22

## 2018-01-22 NOTE — TELEPHONE ENCOUNTER
"Requested Prescriptions   Pending Prescriptions Disp Refills     irbesartan (AVAPRO) 300 MG tablet [Pharmacy Med Name: IRBESARTAN 300 MG Tablet]  Last Written Prescription Date:  8/24/2017  Last Fill Quantity: 90 tablet,  # refills: 0   Last Office Visit with [facility], [facility] or [facility] prescribing provider:  1/9/2018   Future Office Visit:    Next 5 appointments (look out 90 days)     Jan 24, 2018 10:40 AM CST   SHORT with Pop Zapien MD   [facility][note 3] ([facility][note 3])    [address] [place] [id number] [phone]                90 tablet 0     Sig: TAKE 1 TABLET EVERY DAY    Angiotensin-II Receptors Failed    1/20/2018  4:23 PM       Failed - Blood pressure under 140/90 in past 12 months.    BP Readings from Last 3 Encounters:   01/17/18 147/62   01/09/18 114/52   01/02/18 105/55          Failed - Normal serum potassium on file in past 12 months    Recent Labs   Lab Test  01/17/18   1124   POTASSIUM  5.4*           Failed - No positive pregnancy test in past 12 months       Passed - Recent or future visit with authorizing provider's specialty    Patient had office visit in the last year or has a visit in the next 30 days with authorizing provider.  See \"Patient Info\" tab in inbasket, or \"Choose Columns\" in Meds & Orders section of the refill encounter.          Passed - Patient is age 18 or older       Passed - No active pregnancy on record       Passed - Normal serum creatinine on file in past 12 months    Recent Labs   Lab Test  01/17/18   1124   CR  1.04               "

## 2018-01-22 NOTE — PROGRESS NOTES
Attempted to call for hospital follow up and to remind pt of appointment Wednesday 1/24. No answer and left detailed vm.

## 2018-01-23 ENCOUNTER — PRE VISIT (OUTPATIENT)
Dept: CARDIOLOGY | Facility: CLINIC | Age: 83
End: 2018-01-23

## 2018-01-23 DIAGNOSIS — I50.22 CHRONIC SYSTOLIC HEART FAILURE (H): Primary | ICD-10-CM

## 2018-01-24 ENCOUNTER — OFFICE VISIT (OUTPATIENT)
Dept: FAMILY MEDICINE | Facility: CLINIC | Age: 83
End: 2018-01-24
Payer: MEDICARE

## 2018-01-24 ENCOUNTER — PRE VISIT (OUTPATIENT)
Dept: UROLOGY | Facility: CLINIC | Age: 83
End: 2018-01-24

## 2018-01-24 ENCOUNTER — OFFICE VISIT (OUTPATIENT)
Dept: CARDIOLOGY | Facility: CLINIC | Age: 83
End: 2018-01-24
Attending: NURSE PRACTITIONER
Payer: MEDICARE

## 2018-01-24 VITALS
HEART RATE: 51 BPM | WEIGHT: 157 LBS | SYSTOLIC BLOOD PRESSURE: 135 MMHG | DIASTOLIC BLOOD PRESSURE: 67 MMHG | BODY MASS INDEX: 32.95 KG/M2 | HEIGHT: 58 IN | OXYGEN SATURATION: 97 %

## 2018-01-24 VITALS
RESPIRATION RATE: 20 BRPM | DIASTOLIC BLOOD PRESSURE: 50 MMHG | WEIGHT: 156.75 LBS | HEART RATE: 54 BPM | TEMPERATURE: 97.7 F | SYSTOLIC BLOOD PRESSURE: 117 MMHG | BODY MASS INDEX: 33.82 KG/M2 | OXYGEN SATURATION: 97 % | HEIGHT: 57 IN

## 2018-01-24 DIAGNOSIS — E04.2 NONTOXIC MULTINODULAR GOITER: ICD-10-CM

## 2018-01-24 DIAGNOSIS — I50.22 CHRONIC SYSTOLIC HEART FAILURE (H): Primary | ICD-10-CM

## 2018-01-24 DIAGNOSIS — I10 HYPERTENSION GOAL BP (BLOOD PRESSURE) < 140/90: ICD-10-CM

## 2018-01-24 DIAGNOSIS — I50.22 CHRONIC SYSTOLIC HEART FAILURE (H): ICD-10-CM

## 2018-01-24 DIAGNOSIS — F43.0 ACUTE REACTION TO STRESS: ICD-10-CM

## 2018-01-24 DIAGNOSIS — E05.90 HYPERTHYROIDISM: ICD-10-CM

## 2018-01-24 DIAGNOSIS — I50.23 ACUTE ON CHRONIC SYSTOLIC CONGESTIVE HEART FAILURE (H): ICD-10-CM

## 2018-01-24 DIAGNOSIS — I48.91 ATRIAL FIBRILLATION, UNSPECIFIED TYPE (H): Primary | ICD-10-CM

## 2018-01-24 LAB
ANION GAP SERPL CALCULATED.3IONS-SCNC: 7 MMOL/L (ref 3–14)
BUN SERPL-MCNC: 17 MG/DL (ref 7–30)
CALCIUM SERPL-MCNC: 9.2 MG/DL (ref 8.5–10.1)
CHLORIDE SERPL-SCNC: 91 MMOL/L (ref 94–109)
CO2 SERPL-SCNC: 27 MMOL/L (ref 20–32)
CREAT SERPL-MCNC: 0.97 MG/DL (ref 0.52–1.04)
GFR SERPL CREATININE-BSD FRML MDRD: 55 ML/MIN/1.7M2
GLUCOSE SERPL-MCNC: 91 MG/DL (ref 70–99)
POTASSIUM SERPL-SCNC: 4.9 MMOL/L (ref 3.4–5.3)
SODIUM SERPL-SCNC: 125 MMOL/L (ref 133–144)

## 2018-01-24 PROCEDURE — 80048 BASIC METABOLIC PNL TOTAL CA: CPT | Performed by: NURSE PRACTITIONER

## 2018-01-24 PROCEDURE — 36415 COLL VENOUS BLD VENIPUNCTURE: CPT | Performed by: NURSE PRACTITIONER

## 2018-01-24 PROCEDURE — G0463 HOSPITAL OUTPT CLINIC VISIT: HCPCS | Mod: ZF

## 2018-01-24 PROCEDURE — 99214 OFFICE O/P EST MOD 30 MIN: CPT | Performed by: FAMILY MEDICINE

## 2018-01-24 PROCEDURE — 99214 OFFICE O/P EST MOD 30 MIN: CPT | Mod: ZP | Performed by: NURSE PRACTITIONER

## 2018-01-24 RX ORDER — METHIMAZOLE 5 MG/1
5 TABLET ORAL 3 TIMES DAILY
Qty: 270 TABLET | Refills: 0 | Status: SHIPPED | OUTPATIENT
Start: 2018-01-24 | End: 2018-03-09

## 2018-01-24 RX ORDER — IRBESARTAN 300 MG/1
TABLET ORAL
Qty: 90 TABLET | Refills: 0 | Status: SHIPPED | OUTPATIENT
Start: 2018-01-24 | End: 2018-03-09

## 2018-01-24 RX ORDER — PROPRANOLOL HCL 60 MG
60 CAPSULE, EXTENDED RELEASE 24HR ORAL DAILY
Qty: 90 CAPSULE | Refills: 0 | Status: SHIPPED | OUTPATIENT
Start: 2018-01-24 | End: 2018-03-09

## 2018-01-24 ASSESSMENT — PATIENT HEALTH QUESTIONNAIRE - PHQ9
SUM OF ALL RESPONSES TO PHQ QUESTIONS 1-9: 11
5. POOR APPETITE OR OVEREATING: NEARLY EVERY DAY

## 2018-01-24 ASSESSMENT — ANXIETY QUESTIONNAIRES
1. FEELING NERVOUS, ANXIOUS, OR ON EDGE: NEARLY EVERY DAY
3. WORRYING TOO MUCH ABOUT DIFFERENT THINGS: NEARLY EVERY DAY
2. NOT BEING ABLE TO STOP OR CONTROL WORRYING: NEARLY EVERY DAY
IF YOU CHECKED OFF ANY PROBLEMS ON THIS QUESTIONNAIRE, HOW DIFFICULT HAVE THESE PROBLEMS MADE IT FOR YOU TO DO YOUR WORK, TAKE CARE OF THINGS AT HOME, OR GET ALONG WITH OTHER PEOPLE: SOMEWHAT DIFFICULT
GAD7 TOTAL SCORE: 18
6. BECOMING EASILY ANNOYED OR IRRITABLE: NEARLY EVERY DAY
5. BEING SO RESTLESS THAT IT IS HARD TO SIT STILL: NEARLY EVERY DAY
7. FEELING AFRAID AS IF SOMETHING AWFUL MIGHT HAPPEN: NOT AT ALL

## 2018-01-24 ASSESSMENT — PAIN SCALES - GENERAL: PAINLEVEL: NO PAIN (0)

## 2018-01-24 NOTE — PATIENT INSTRUCTIONS
"You were seen today in the Cardiovascular Clinic at the [facility].     Cardiology Providers you saw during your visit: Lynn DAMON, CNP       1. Stop Aldactone (spironolactone)  2. Follow up in 3 weeks with CORE cardiology with labs prior.   3. Check labs in 1 week              Please limit your fluid intake to 2 L (64 ounces) daily.  2 Liters a day = 8.5 cups, or 72 ounces.  Please limit your salt intake to 2 grams a day or less.    If you gain 2# in 24 hours or 5# in one week call Kayli Olsen RN so we can adjust your medications as needed over the phone.    Please feel free to call me with any questions or concerns.      Kayli Olsen RN BSN   [facility]  Cardiology Care Coordinator-Heart Failure Clinic    Questions and [phone].   First press #1 for the University and then press #3 for \"Medical Questions\" to reach us Cardiology Nurses.     On Call Cardiologist for after hours or on weekends: [phone]   option #4 and ask to speak to the on-call Cardiologist. Inform them you are a CORE/heart failure patient at the [facility].        If you need a medication refill please contact your pharmacy.  Please allow 3 business days for your refill to be completed.  _______________________________________________________  C.O.R.E. CLINIC Cardiomyopathy, Optimization, Rehabilitation, Education   The C.O.R.E. CLINIC is a heart failure specialty clinic within the [facility] where you will work with specialized nurse practitioners dedicated to helping patients with heart failure carefully adjust medications, receive education, and learn who and when to call if symptoms develop. They specialize in helping you better understand your condition, slow the progression of your disease, improve the length and quality of your life, help you detect future heart problems before they become life threatening, and avoid hospitalizations.  As " always, thank you for trusting us with your health care needs!

## 2018-01-24 NOTE — NURSING NOTE
Diet: Patient instructed regarding a heart failure healthy diet, including discussion of reduced fat and 2000 mg daily sodium restriction, daily weights, medication purpose and compliance, fluid restrictions and resources for patient and family to access for assistance with heart failure management.       Labs: Patient was given results of the laboratory testing obtained today and patient was instructed about when to return for the next laboratory testing.    Med Reconcile: Reviewed and verified all current medications with the patient. The updated medication list was printed and given to the patient.    Return Appointment: Patient given instructions regarding scheduling next clinic visit.     Patient stated she understood all health information given and agreed to call with further questions or concerns.    Today's clinic visit:   Stop aldactone  F/u in 3 weeks with labs prior  Labs on 2/1  We will try and get you into Endocrinology sooner.

## 2018-01-24 NOTE — MR AVS SNAPSHOT
After Visit Summary   1/24/2018    Dimple Oviedo    MRN: [id number]           Patient Information     Date Of Birth          6/23/1933        Visit Information        Provider Department      1/24/2018 10:40 AM Pop Zapien MD [facility]        Today's Diagnoses     Nontoxic multinodular goiter    -  1    Hypertension goal BP (blood pressure) < 140/90        Acute on chronic systolic congestive heart failure (H)           Follow-ups after your visit        Your next 10 appointments already scheduled     Jan 24, 2018  1:30 PM CST   Lab with  LAB   [facility] ([facility])    [address] [place] [address]  1st Floor  [facility] [id number] [phone]            Jan 24, 2018  2:00 PM CST   (Arrive by 1:45 PM)   CORE RETURN with Lynn Cyr NP   [facility] ([facility])    [address] [place] [address]  [address]  [facility] [id number] [phone]            Feb 01, 2018  8:45 AM CST   (Arrive by 8:30 AM)   Cystoscopy with Kenna La MD   [facility] ([facility])    [address] [place] [address]  4th Floor  [facility] [id number] [phone]            Feb 07, 2018  1:20 PM CST   Office Visit with Pop Zapien MD   [facility] ([facility])    [address] [place] [id number] [phone]           Bring a current list of meds and any records pertaining to this visit. For Physicals, please bring immunization records and any forms needing to be filled out. Please arrive 10 minutes early to complete paperwork.            Feb 21, 2018  5:30 PM CST   (Arrive by 5:15 PM)   NEW ENDOCRINE with Cynthia Garcia MD   [facility] ([facility])    [address] [place] [address]  3rd Floor  [facility] [id number] [phone]              Future tests that  "were ordered for you today     Open Future Orders        Priority Expected Expires Ordered    Basic metabolic panel Routine 1/24/2018 2/2/2018 1/23/2018            Who to contact     If you have questions or need follow up information about today's clinic visit or your schedule please contact [facility] directly at [phone].  Normal or non-critical lab and imaging results will be communicated to you by [employer], letter or phone within 4 business days after the clinic has received the results. If you do not hear from us within 7 days, please contact the clinic through [employer] or phone. If you have a critical or abnormal lab result, we will notify you by phone as soon as possible.  Submit refill requests through [employer] or call your pharmacy and they will forward the refill request to us. Please allow 3 business days for your refill to be completed.          Additional Information About Your Visit        [employer] Information     [employer] gives you secure access to your electronic health record. If you see a primary care provider, you can also send messages to your care team and make appointments. If you have questions, please call your primary care clinic.  If you do not have a primary care provider, please call [phone] and they will assist you.        Care EveryWhere ID     This is your Care EveryWhere ID. This could be used by other organizations to access your [facility] medical records  [id number]        Your Vitals Were     Pulse Temperature Respirations Height Pulse Oximetry BMI (Body Mass Index)    54 97.7  F (36.5  C) (Oral) 20 4' 9.01\" (1.448 m) 97% 33.91 kg/m2       Blood Pressure from Last 3 Encounters:   01/24/18 117/50   01/17/18 147/62   01/09/18 114/52    Weight from Last 3 Encounters:   01/24/18 156 lb 12 oz (71.1 kg)   01/17/18 153 lb 11.2 oz (69.7 kg)   01/09/18 156 lb 4 oz (70.9 kg)              Today, you had the following     No orders found for display         Where to get " "your medicines      These medications were sent to [facility] [address] [place] [address] - [address] [facility]  [address], [facility] [address]    Hours:  24-hours Phone:  [phone]     methimazole 5 MG tablet    propranolol 60 MG 24 hr capsule          Primary Care Provider Office Phone # Fax #    Pop Antonino Zapien [phone] [address] [place] [address]        Equal Access to Services     [facility]: Hadii aad ku hadasho Soomaali, waaxda luqadaha, qaybta kaalmada adeegyada, waxay idiin hayaan adeeg kharaannemarie seay . So [facility] [phone].    ATENCIÓN: Si habla español, tiene a sierra disposición servicios gratuitos de asistencia lingüística. [facility] [phone].    We comply with applicable federal civil rights laws and [place] laws. We do not discriminate on the basis of race, color, national origin, age, disability, sex, sexual orientation, or gender identity.            Thank you!     Thank you for choosing [facility]  for your care. Our goal is always to provide you with excellent care. Hearing back from our patients is one way we can continue to improve our services. Please take a few minutes to complete the written survey that you may receive in the mail after your visit with us. Thank you!             Your Updated Medication List - Protect others around you: Learn how to safely use, store and throw away your medicines at www.disposemymeds.org.          This list is accurate as of 1/24/18 11:30 AM.  Always use your most recent med list.                   Brand Name Dispense Instructions for use Diagnosis    acetaminophen 650 MG 8 hour tablet     100 tablet    Take 650 mg by mouth every 8 hours as needed for mild pain or fever    Ascending cholangitis       alendronate 70 MG tablet    FOSAMAX    12 tablet    TAKE 1 TABLET EVERY 7 DAYS AT LEAST 60 MIN BEFORE BREAKFAST AS DIRECTED \"SEE PACKAGE FOR ADDITIONAL " "INSTRUCTIONS\"    High risk medication use, Osteopenia       ASPIRIN PO      Take 81 mg by mouth At Bedtime        Calcium carb-Vitamin D 500 mg [place]-200 units 500-200 MG-UNIT per tablet    OSCAL with D;Oyster Shell Calcium     Take 1 tablet by mouth 2 times daily (with meals)        CRANBERRY CONCENTRATE PO      Take 1 capsule by mouth 2 times daily        cycloSPORINE 0.05 % ophthalmic emulsion    RESTASIS     Place 1 drop into both eyes 2 times daily        Fish Oil 500 MG Caps      Take 1 capsule by mouth 2 times daily        furosemide 20 MG tablet    LASIX    30 tablet    Take 1 tablet (20 mg) by mouth daily    Elevated troponin       irbesartan 300 MG tablet    AVAPRO    90 tablet    TAKE 1 TABLET EVERY DAY    Essential hypertension with goal blood pressure less than 140/90       lovastatin 40 MG tablet    MEVACOR    90 tablet    TAKE 1 TABLET AT BEDTIME (HYPERLIPIDEMIA LDL GOAL  BELOW 130)    Hyperlipidemia LDL goal <130       melatonin 3 MG tablet     90 tablet    Take 1 tablet (3 mg) by mouth nightly as needed for sleep    Insomnia       methimazole 5 MG tablet    TAPAZOLE    270 tablet    Take 1 tablet (5 mg) by mouth 3 times daily    Nontoxic multinodular goiter       nitroGLYcerin 0.4 MG sublingual tablet    NITROSTAT    25 tablet    For chest pain place 1 tablet under the tongue every 5 minutes for 3 doses. If symptoms persist 5 minutes after 1st dose call 911.    Elevated troponin       omeprazole 20 MG CR capsule    priLOSEC    180 capsule    Take 1 capsule (20 mg) by mouth daily    Gastroesophageal reflux disease without esophagitis       polyethylene glycol Packet    MIRALAX/GLYCOLAX    7 packet    Take 17 g by mouth 2 times daily as needed for constipation    Other constipation       prednisoLONE acetate 1 % ophthalmic susp    PRED FORTE     Place 1 drop into the right eye 4 times daily Taper as directed        propranolol 60 MG 24 hr capsule    INDERAL LA    90 capsule    Take 1 capsule (60 mg) by " mouth daily    Nontoxic multinodular goiter       spironolactone 25 MG tablet    ALDACTONE    30 tablet    Take 0.5 tablets (12.5 mg) by mouth daily    Acute on chronic systolic congestive heart failure (H)       SYSTANE 0.4-0.3 % Soln ophthalmic solution   Generic drug:  polyethylene glycol 0.4%- propylene glycol 0.3%      Place 1 drop into both eyes 3 times daily as needed for dry eyes

## 2018-01-24 NOTE — LETTER
1/24/2018      RE: Dimple Oviedo  [address]   [facility] [address]       Dear Colleague,    Thank you for the opportunity to participate in the care of your patient, Dimple Oviedo, at the [facility] HEART [facility]. Please see a copy of my visit note below.      HPI: Dimple is an 84-year-old female with a past medical history of hypertension, multinodular goiter, osteopenia, hyperthyroidism, GERD, stress-induced cardiomyopathy with an initial estimated ejection fraction of 30-35% now normal at >65% , and hyperlipidemia who presents to [facility] for routine follow-up.      Dimple is feeling miserable.  She is fatigued and SOB with minimal exertion.  Complains of poor appetite and a metallic taste in her mouth.  States nothing tastes good.  Questioning whether or not it may be a side effect of one of her medications.  She states she is extremely irritable, depressed, and wants to go home.  Currently is staying with her daughter.    She denies SOB at rest, PND, orthopnea, edema, weight gain, chest pain, palpitations, lightheadedness, dizziness, near syncopal/syncopal episodes.  Is following <2400 mg sodium a day diet.       PAST MEDICAL HISTORY:  Past Medical History:   Diagnosis Date     Calculus of kidney      Esophageal reflux      Hyperlipidemia LDL goal <130 5/9/2010     Malignant melanoma of skin of trunk, except scrotum (H)      Nonspecific abnormal finding     has living will 2004 -      Nontoxic multinodular goiter     no further eval /tx rec per pt     Osteopenia      Other psoriasis      Personal history of colonic polyps      PMR (polymyalgia rheumatica) (H)      Undiagnosed cardiac murmurs      Unspecified constipation      Unspecified essential hypertension        FAMILY HISTORY:  Family History   Problem Relation Age of Onset     CANCER Father      dec - esophageal and laryngeal     HEART DISEASE Mother      Respiratory Mother      dec        SOCIAL HISTORY:  Social History     Social History     Marital status:      Spouse name:      Number of children: 2     Years of education: N/A     Occupational History      Retired     Social History Main Topics     Smoking status: Never Smoker     Smokeless tobacco: Never Used     Alcohol use No      Comment: 6 times per year-one drink     Drug use: No     Sexual activity: Yes     Partners: Male     Other Topics Concern      Service No     Blood Transfusions No     Exercise Yes     curves     Seat Belt Yes     Self-Exams Yes     Parent/Sibling W/ Cabg, Mi Or Angioplasty Before 65f 55m? No     Social History Narrative    December 7, 2009    Balanced Diet - Yes    Osteoporosis Prevention Measures - Dairy servings per day: 2 and Medication/Supplements (See current meds)    Regular Exercise -  Yes Describe curves, walking    Dental Exam - YES - Date: 10/2009    Eye Exam - YES - Date: 2008    Self Breast Exam - Yes    Abuse: Current or Past (Physical, Sexual or Emotional)- No    Do you feel safe in your environment - Yes    Guns stored in the home - No    Sunscreen used - Yes    Seatbelts used - Yes    Lipids -  YES - Date: 11/24/2009    Glucose -  YES - Date: 11/24/2009    Colon Cancer Screening - Colonoscopy 11/18/2005(date completed)    Hemoccults - YES - Date: 10/12/2004    Pap Test -  YES - Date: 09/22/2004    Do you have any concerns about STD's -  No    Mammography - YES - Date: 11/24/2009    DEXA - YES - Date: 11/20/2008    Immunizations reviewed and up to date - Yes    PAULETTE Spencer CMA                   CURRENT MEDICATIONS:  Current Outpatient Prescriptions   Medication Sig Dispense Refill     irbesartan (AVAPRO) 300 MG tablet TAKE 1 TABLET EVERY DAY 90 tablet 0     propranolol (INDERAL LA) 60 MG 24 hr capsule Take 1 capsule (60 mg) by mouth daily 90 capsule 0     methimazole (TAPAZOLE) 5 MG tablet Take 1 tablet (5 mg) by mouth 3 times daily 270 tablet 0     ASPIRIN PO Take 81 mg by  "mouth At Bedtime       Calcium carb-Vitamin D 500 mg [place]-200 units (OSCAL WITH D;OYSTER SHELL CALCIUM) 500-200 MG-UNIT per tablet Take 1 tablet by mouth 2 times daily (with meals)       cycloSPORINE (RESTASIS) 0.05 % ophthalmic emulsion Place 1 drop into both eyes 2 times daily       polyethylene glycol 0.4%- propylene glycol 0.3% (SYSTANE) 0.4-0.3 % SOLN ophthalmic solution Place 1 drop into both eyes 3 times daily as needed for dry eyes       furosemide (LASIX) 20 MG tablet Take 1 tablet (20 mg) by mouth daily 30 tablet 3     spironolactone (ALDACTONE) 25 MG tablet Take 0.5 tablets (12.5 mg) by mouth daily 30 tablet 1     nitroGLYcerin (NITROSTAT) 0.4 MG sublingual tablet For chest pain place 1 tablet under the tongue every 5 minutes for 3 doses. If symptoms persist 5 minutes after 1st dose call 911. 25 tablet 3     CRANBERRY CONCENTRATE PO Take 1 capsule by mouth 2 times daily       lovastatin (MEVACOR) 40 MG tablet TAKE 1 TABLET AT BEDTIME (HYPERLIPIDEMIA LDL GOAL  BELOW 130) 90 tablet 2     omeprazole (PRILOSEC) 20 MG CR capsule Take 1 capsule (20 mg) by mouth daily 180 capsule 3     alendronate (FOSAMAX) 70 MG tablet TAKE 1 TABLET EVERY 7 DAYS AT LEAST 60 MIN BEFORE BREAKFAST AS DIRECTED \"SEE PACKAGE FOR ADDITIONAL INSTRUCTIONS\" 12 tablet 2     acetaminophen 650 MG TABS Take 650 mg by mouth every 8 hours as needed for mild pain or fever 100 tablet 0     polyethylene glycol (MIRALAX/GLYCOLAX) packet Take 17 g by mouth 2 times daily as needed for constipation 7 packet 0     Omega-3 Fatty Acids (FISH OIL) 500 MG CAPS Take 1 capsule by mouth 2 times daily        prednisoLONE acetate (PRED FORTE) 1 % ophthalmic suspension Place 1 drop into the right eye 4 times daily Taper as directed       melatonin 3 MG tablet Take 1 tablet (3 mg) by mouth nightly as needed for sleep 90 tablet 0     [DISCONTINUED] METHIMAZOLE PO Take 5 mg by mouth 3 times daily       [DISCONTINUED] propranolol (INDERAL LA) 60 MG 24 hr capsule " "Take 60 mg by mouth daily         ROS:  Constitutional: Denies fever, chills.  +diaphoresis. +Weight loss.  ENT: Denies visual disturbance, hearing loss, epistaxis, vertigo.  Respiratory: See HPI.    Cardiovascular: As per HPI.   GI: Denies nausea, vomiting, diarrhea, hematemesis, melena, or hematochezia.   : Denies urinary frequency, dysuria, or hematuria.   Integument: Negative for bruising, rash, or pruritis.  Psychiatric: +anxiety, +depression, +sleep disturbance,  +mood changes.   Neuro: Negative for headaches, syncope, numbness or tingling.   Endocrinology: +Hyperthyrodism   Musculoskeletal: Negative for gout, joint stiffness, swelling, or muscle weakness    EXAM:  /67 (BP Location: Left arm, Patient Position: Chair, Cuff Size: Adult Regular)  Pulse 51  Ht 1.461 m (4' 9.5\")  Wt 71.2 kg (157 lb)  SpO2 97%  BMI 33.39 kg/m2  General: alert, articulate, agitated and teary eyed throughout the interview.    HEENT: normocephalic, atraumatic, anicteric sclera, EOMI, normal palate, mucosa moist, no cyanosis.    Neck: neck supple.  No adenopathy, masses, or carotid bruits.  JVP 9-10 cm  Heart: regular rhythm, normal S1/S2, no murmur, gallop, rub.  Precordium quiet with normal PMI.     Lungs: clear, no rales, ronchi, or wheezing.  No accessory muscle use, respirations unlabored.   Abdomen: soft, non-tender, bowel sounds present, no hepatomegaly  Extremities: no clubbing, cyanosis or 2 +edema.  2+ pedal pulses.   Neurological: alert and oriented x 3.  Fine tremors of hands bilaterally noted.  Accelerated speech and agitated affect, no gross motor deficits  Skin:  No ecchymoses or rashes.    Labs:  CBC RESULTS:  Lab Results   Component Value Date    WBC 8.1 01/17/2018    RBC 4.13 01/17/2018    HGB 12.1 01/17/2018    HCT 37.1 01/17/2018    MCV 90 01/17/2018    MCH 29.3 01/17/2018    MCHC 32.6 01/17/2018    RDW 13.9 01/17/2018     01/17/2018       CMP RESULTS:  Lab Results   Component Value Date     " (L) 2018    POTASSIUM 5.4 (H) 2018    CHLORIDE 96 2018    CO2 25 2018    ANIONGAP 8 2018     (H) 2018    BUN 27 2018    CR 1.04 2018    GFRESTIMATED 50 (L) 2018    GFRESTBLACK 61 2018    SHREYA 9.0 2018    BILITOTAL 0.5 2017    ALBUMIN 3.0 (L) 2017    ALKPHOS 74 2017    ALT 85 (H) 2017    AST 76 (H) 2017        INR RESULTS:  Lab Results   Component Value Date    INR 1.71 (H) 2018       No components found for: CK  Lab Results   Component Value Date    MAG 2.1 2018     Lab Results   Component Value Date    NTBNP 2974 (H) 2017       Most recent echocardiogram:  Recent Results (from the past 8760 hour(s))   ECHO LIMITED WITH OPTISON    Narrative    [id number]  [id number]  [id number]  [id number]^JONTAHAN^ROLANDA^           [facility],[place]  Echocardiography Laboratory  [address] [place] [address]     Name: QUIN VALDEZ  MRN: [id number]  : 1933  Study Date: 2018 11:12 AM  Age: 84 yrs  Gender: Female  Patient Location: [facility]  Reason For Study: Afib  Ordering Physician: ROLANDA CASTILLO  Performed By: Yasmany Esteves RDCS     BSA: 1.7 m2  Height: 57 in  Weight: 176 lb  HR: 60  BP: 135/60 mmHg  _____________________________________________________________________________  __        Procedure  Limited Portable Echo Adult. Contrast Optison. Optison (NDC #1046-4821-38)  given intravenously. Patient was given 4 ml mixture of 3 ml Optison and 6 ml  saline. 5 ml wasted.  _____________________________________________________________________________  __        Interpretation Summary  Left ventricular function is normal.The EF is > 65%.  The inferior vena cava was normal in size with preserved respiratory  variability.  No pericardial effusion is present.  The left ventricular function has  improved.  _____________________________________________________________________________  __        Left Ventricle  Left ventricular function is normal.The EF is > 65%. Left ventricular wall  thickness is normal. Left ventricular size is normal. Diastolic function not  assessed due to atrial fibrillation.     Right Ventricle  The right ventricle cannot be assessed.     Atria  The atria cannot be assessed.     Mitral Valve  The mitral valve cannot be assessed. Mild mitral annular calcification is  present.        Aortic Valve  The aortic valve cannot be assessed.     Tricuspid Valve  The tricuspid valve cannot be assessed.     Pulmonic Valve  The pulmonic valve cannot be assessed.     Vessels  The aorta root cannot be assessed. The thoracic aorta cannot be assessed. The  pulmonary artery cannot be assessed. The inferior vena cava was normal in size  with preserved respiratory variability.     Pericardium  No pericardial effusion is present.        Compared to Previous Study  The left ventricular function has improved.     Attestation  I have personally viewed the imaging and agree with the interpretation and  report as documented by the fellow, bernardino rios, and/or edited by me.  _____________________________________________________________________________  __     MMode/2D Measurements & Calculations  IVSd: 1.0 cm  LVIDd: 4.6 cm  LVIDs: 3.2 cm  LVPWd: 0.91 cm  FS: 29.8 %  EDV(Teich): 96.8 ml  ESV(Teich): 41.6 ml  LV mass(C)d: 154.2 grams  LV mass(C)dI: 90.5 grams/m2  RWT: 0.40           _____________________________________________________________________________  __           Report approved by: Ade Razo 2018 12:39 PM      ECHO COMPLETE WITH OPTISON    Narrative    [id number]  [id number]  [id number]  [id number]^RETA^RICK^E           [facility],[place]  Echocardiography Laboratory  [address] [place] [id number]     Name: QUIN VALDEZ  MRN: [id number]  :  06/23/1933  Study Date: 12/13/2017 09:39 AM  Age: 84 yrs  Gender: Female  Patient Location: [facility]  Reason For Study: Dyspnea  Ordering Physician: RICK MCDUFFIE  Performed By: CECILIA See     BSA: 1.7 m2  Height: 58 in  Weight: 176 lb  BP: 131/85 mmHg  _____________________________________________________________________________  __        Procedure  Echocardiogram with two-dimensional, color and spectral Doppler performed.  Contrast Optison. Optison (NDC #9444-4568-67) given intravenously. Patient was  given 6.0 ml mixture of 3 ml Optison and 6 ml saline. 3.0 ml wasted.  _____________________________________________________________________________  __        Interpretation Summary  Technically difficult study due to poor acoustic windows.     Normal left ventricular size with mild concentric hypertrophy.  Global akinesis with sparing of the basal segments consistent with stress  cardiomyopathy versus multivessel coronary artery disease. Moderately reduced  systolic function. EF 30-35%.  Normal right ventricular size and function.  Severe left atrial enlargement.  No pericardial effusion.     Compared to the prior study 10/31/15 the wall motion abnormalities and LV  dysfunction are new.  _____________________________________________________________________________  __        Left Ventricle  Left ventricular size is normal. Mild concentric wall thickening consistent  with left ventricular hypertrophy is present. Moderately (EF 30-35%) reduced  left ventricular function is present. Global akinesis with sparing of the  basal segments consistent with stress cardiomyopathy versus multivessel  coronary artery disease.     Right Ventricle  The right ventricle is normal size. Global right ventricular function is  normal.     Atria  The right atria appears normal. Severe left atrial enlargement is present.        Mitral Valve  The mitral valve is normal. Trace mitral insufficiency is present.     Aortic  Valve  Moderate aortic valve calcification. Mild aortic insufficiency is present.  Mild aortic stenosis is present.     Tricuspid Valve  The tricuspid valve is normal. Trace tricuspid insufficiency is present. The  peak velocity of the tricuspid regurgitant jet is not obtainable.     Pulmonic Valve  The pulmonic valve is normal. Trace pulmonic insufficiency is present.     Vessels  The aorta root is normal. The inferior vena cava was normal in size with  preserved respiratory variability. Estimated mean right atrial pressure is 3  mmHg.     Pericardium  No pericardial effusion is present.        Attestation  I have personally viewed the imaging and agree with the interpretation and  report as documented by the fellow, Chencho Chanel, and/or edited by me.  _____________________________________________________________________________  __     MMode/2D Measurements & Calculations  IVSd: 1.3 cm  LVIDd: 4.7 cm  LVIDs: 3.4 cm  LVPWd: 1.2 cm  FS: 27.4 %  EDV(Teich): 101.3 ml  ESV(Teich): 47.4 ml  LV mass(C)d: 219.4 grams  LV mass(C)dI: 127.2 grams/m2  Ao root diam: 2.5 cm  LVOT diam: 2.2 cm  LVOT area: 3.8 cm2     EF(MOD-bp): 31.6 %  LA Volume (BP): 88.7 ml  LA Volume Index (BP): 51.6 ml/m2     RWT: 0.51        Doppler Measurements & Calculations  MV E max tati: 127.8 cm/sec  Ao V2 max: 212.3 cm/sec  Ao max P.0 mmHg  Ao V2 mean: 147.3 cm/sec  Ao mean PG: 10.0 mmHg  Ao V2 VTI: 40.1 cm  PAUL(I,D): 1.7 cm2  PAUL(V,D): 1.7 cm2  LV V1 max PG: 3.6 mmHg  LV V1 max: 95.3 cm/sec  LV V1 VTI: 18.2 cm  SV(LVOT): 69.2 ml  SI(LVOT): 40.1 ml/m2  PAUL Index (cm2/m2): 1.0  E/E' av.7  Lateral E/e': 25.6  Medial E/e': 23.8        _____________________________________________________________________________  __        Report approved by: Ade Razo 2017 10:42 AM            Assessment and Plan:    In summary Dimple is an 84-year-old female with hyperthyroidism and stress-induced cardiomyopathy who appears to be doing well  from a heart failure perspective.  Her sodium is low at 130, and her potassium is at the higher end at 4.8, therefore I stopped her Aldactone which I feel may be contributing to her hyponatremia.      Her primary concern today is her hyperthyroidism.  We spent a fair amount of time going over the symptoms of hyperthyroidism and I reassured her that the symptoms she was feeling were related to her hyperthyroidism.  Since she is feeling so miserable, I will attempt to get her in to see endocrine sooner.  In regards to the metallic taste in her mouth I do see that a metallic taste can be due to methimazole.  I will defer further management to her endocrinologist and her primary doctor.    She will return for a lab test to recheck her sodium and potassium levels in 1 week.  She will return to [facility] clinic in 3 weeks with labs prior.  I did not see a reason why she needed to still stay with her daughter.  She does have neighbors and family that check on her frequently and seems to be doing well managing her medical care with some assistance from her family.  Both she and her daughter agreed with this plan.    1.  Chronic systolic heart failure (EF now >65% on 1/18 echo) secondary to stress-induced cardiomyopathy.  Stage C  NYHA Class II  ACEi/ARB: yes, Irbesartan 300 mg daily.  BB: yes, propanolol which she is taking for hyperthyroidism.  Aldosterone antagonist: Stop Aldactone secondary to hyponatremia.  Recheck BMP in one week.   SCD prophylaxis: does not meet criteria for implant  Fluid status: euvolemic  Anticoagulation: None.   Antiplatelet:  ASA dose 81 mg daily.   Sleep apnea: No  NSAID use:  Contraindicated.  Avoid use.    2.  Hyperthyroidism:  Treated with methimazole and  Propanolol.  Follow up with endocrinology as scheduled.  Will attempt to move up her appointment sooner as she is symptomatic.    3.  HTN:  135/67.  Taking Irbesartan and propanolol. Continue to monitor for now.     4.  Follow-up BMP in one  week.  Endocrine as outlined above.  [facility] clinic in 3 weeks with labs prior.      Lynn DAMON, CNP  [facility]   [phone]

## 2018-01-24 NOTE — NURSING NOTE
"Chief Complaint   Patient presents with     Hospital F/U       Initial /50 (Cuff Size: Adult Regular)  Pulse 54  Temp 97.7  F (36.5  C) (Oral)  Resp 20  Ht 4' 9.01\" (1.448 m)  Wt 156 lb 12 oz (71.1 kg)  SpO2 97%  BMI 33.91 kg/m2 Estimated body mass index is 33.91 kg/(m^2) as calculated from the following:    Height as of this encounter: 4' 9.01\" (1.448 m).    Weight as of this encounter: 156 lb 12 oz (71.1 kg).  Medication Reconciliation: complete     Amberly De León, VIRGEN      "

## 2018-01-24 NOTE — NURSING NOTE
Chief Complaint   Patient presents with     Follow Up For     New [facility]ORE; 84yr old female with new diagnosis of stress induced cardiomyopathy presenting post hospitalization for follow-up with labs prior.     Vitals were taken and medications were reconciled.  JEAN CLAUDE Pedroza  1:45 PM

## 2018-01-24 NOTE — TELEPHONE ENCOUNTER
Pt coming in for a cystoscopy as part of a hematuria work up. Chart reviewed and cytology is in process. No need for a call.

## 2018-01-24 NOTE — PROGRESS NOTES
SUBJECTIVE:   Dimple Oviedo is a 84 year old female who presents to clinic today for the following health issues:          Hospital Follow-up Visit:    Hospital/Nursing Home/IP Rehab Facility: [facility]  Date of Admission: 01/16/2018  Date of Discharge: 01/17/2018  Reason(s) for Admission: Afib with RVR, gross hematuria            Problems taking medications regularly:  None       Medication changes since discharge: None       Problems adhering to non-medication therapy:  None    Summary of hospitalization:  [facility] discharge summary reviewed  Diagnostic Tests/Treatments reviewed.  Follow up needed: none  Other Healthcare Providers Involved in Patient s Care:         cardiology  Update since discharge: annalee.      Post Discharge Medication Reconciliation: discharge medications reconciled, continue medications without change.  Plan of care communicated with patient and family     Coding guidelines for this visit:  Type of Medical   Decision Making Face-to-Face Visit       within 7 Days of discharge Face-to-Face Visit        within 14 days of discharge   Moderate Complexity [id number]   High Complexity [id number]          Feeling shaky, nervous and depressed.   Cant sleep. Food does not taste well.   Watches her sodium.   Wants to see thyroid doctor at [facility] now.   Hard to get her medications from Dr Gonsalez office.   Blood in urine - going to see urologist. Somewhat better - some days without any blood in urine.   Hard to sleep and stressed due to that. Worried about her meds, chronic conditions and running low on her meds.     Wondering if she needs to be on heart failure medications lifelong.     Problem list and histories reviewed & adjusted, as indicated.  Additional history: as documented    Labs reviewed in EPIC    Reviewed and updated as needed this visit by clinical staff    Reviewed and updated as needed this visit by Provider      Social History     Social History      Marital status:      Spouse name:      Number of children: 2     Years of education: N/A     Occupational History      Retired     Social History Main Topics     Smoking status: Never Smoker     Smokeless tobacco: Never Used     Alcohol use No      Comment: 6 times per year-one drink     Drug use: No     Sexual activity: Yes     Partners: Male     Other Topics Concern      Service No     Blood Transfusions No     Exercise Yes     curves     Seat Belt Yes     Self-Exams Yes     Parent/Sibling W/ Cabg, Mi Or Angioplasty Before 65f 55m? No     Social History Narrative    December 7, 2009    Balanced Diet - Yes    Osteoporosis Prevention Measures - Dairy servings per day: 2 and Medication/Supplements (See current meds)    Regular Exercise -  Yes Describe curves, walking    Dental Exam - YES - Date: 10/2009    Eye Exam - YES - Date: 2008    Self Breast Exam - Yes    Abuse: Current or Past (Physical, Sexual or Emotional)- No    Do you feel safe in your environment - Yes    Guns stored in the home - No    Sunscreen used - Yes    Seatbelts used - Yes    Lipids -  YES - Date: 11/24/2009    Glucose -  YES - Date: 11/24/2009    Colon Cancer Screening - Colonoscopy 11/18/2005(date completed)    Hemoccults - YES - Date: 10/12/2004    Pap Test -  YES - Date: 09/22/2004    Do you have any concerns about STD's -  No    Mammography - YES - Date: 11/24/2009    DEXA - YES - Date: 11/20/2008    Immunizations reviewed and up to date - Yes    PAULETTE Spencer CMA                 Allergies   Allergen Reactions     No Known Drug Allergies      Patient Active Problem List   Diagnosis     Esophageal reflux     restless leg     heat intolerance     Goiter     Disorder of bone and cartilage     Other psoriasis     perirectal cyst     Malignant melanoma of skin of trunk, except scrotum (H)     Undiagnosed cardiac murmurs     Nontoxic multinodular goiter     Hyperlipidemia LDL goal <130     Hypertension goal BP (blood pressure) <  "140/90     Urinary incontinence     Osteoarthritis of left knee     Hip arthritis     Polymyalgia rheumatica (H)     High risk medication use     Shoulder pain     Sepsis (H)     Impaired fasting blood sugar     Chronic bilateral low back pain without sciatica     Obesity, unspecified obesity severity, unspecified obesity type     Iron deficiency anemia, unspecified iron deficiency anemia type     NSTEMI (non-ST elevated myocardial infarction) (H)     Stress-induced cardiomyopathy     A-fib (H)     Reviewed medications, social history and  past medical and surgical history.    Review of system: for general, respiratory, CVS, GI and psychiatry negative except for noted above.     EXAM:  /50 (Cuff Size: Adult Regular)  Pulse 54  Temp 97.7  F (36.5  C) (Oral)  Resp 20  Ht 4' 9.01\" (1.448 m)  Wt 156 lb 12 oz (71.1 kg)  SpO2 97%  BMI 33.91 kg/m2  Constitutional:tearful on and off.  Psychiatric: mentation appears normal and affect normal/bright  Cardiovascular: RRR. No murmurs,  Respiratory: negative, Lungs clear. No crackles or wheezing. No tachypnea.        ASSESSMENT / PLAN:  (I48.91) Atrial fibrillation, unspecified type (H)  (primary encounter diagnosis)  Comment: I reviewed her hospital discharge summary.  I also reviewed cardiology consults.  Plan: She is seeing cardiology later on today for further evaluation    (I50.23) Acute on chronic systolic congestive heart failure (H)  Comment:    Plan: She is seeing cardiology and further treatment as per cardiology.    (E04.2) Nontoxic multinodular goiter  Comment: She is currently seeing an endocrine and her thyroid is also contributing to her mood.  She is currently frustrated with the care she is receiving from her current endocrine and she is already scheduled to see endocrine at the [facility].  She is quite nervous about not getting her medication refilled on time and she is in tears multiple times and I have refilled her both propranolol and " methimazole for 3 months.  Further treatment as per endocrine once he is able to see endocrine at the [facility].  Plan: propranolol (INDERAL LA) 60 MG 24 hr capsule,         methimazole (TAPAZOLE) 5 MG tablet             (I10) Hypertension goal BP (blood pressure) < 140/90  Comment:    Plan: Patient is tolerating current medication without any major side effects of concerns and current dose seems reasonable too.  Current medication regime is effective. Continue current treatment without any changes.     (F43.0) Acute reaction to stress  Comment:    Plan: Due to recurrent hospitalization, current change in her health and currently dependent on her family members she  is having some passive suicidal thoughts and having multiple teary episodes.  Today I have introduced her to Yony Cortez.  We also talked about considering SSRI medications and when we talk about side effects and generally takes a few months for it to show his major benefit she was not too excited to consider medications.  There is a significant change in her mentation and significant worsening of her anxiety, I am going to see her next week again and see if I can convience her to consider antianxiety or depression medications.  Her daughter-in-law is accompanying her for all the appointments and she seems like a good support.

## 2018-01-24 NOTE — PROGRESS NOTES
HPI: Dimple is an 84-year-old female with a past medical history of hypertension, multinodular goiter, osteopenia, hyperthyroidism, GERD, stress-induced cardiomyopathy with an initial estimated ejection fraction of 30-35% now normal at >65% , and hyperlipidemia who presents to [facility] for routine follow-up.      Dimple is feeling miserable.  She is fatigued and SOB with minimal exertion.  Complains of poor appetite and a metallic taste in her mouth.  States nothing tastes good.  Questioning whether or not it may be a side effect of one of her medications.  She states she is extremely irritable, depressed, and wants to go home.  Currently is staying with her daughter.    She denies SOB at rest, PND, orthopnea, edema, weight gain, chest pain, palpitations, lightheadedness, dizziness, near syncopal/syncopal episodes.  Is following <2400 mg sodium a day diet.       PAST MEDICAL HISTORY:  Past Medical History:   Diagnosis Date     Calculus of kidney      Esophageal reflux      Hyperlipidemia LDL goal <130 5/9/2010     Malignant melanoma of skin of trunk, except scrotum (H)      Nonspecific abnormal finding     has living will 2004 -      Nontoxic multinodular goiter     no further eval /tx rec per pt     Osteopenia      Other psoriasis      Personal history of colonic polyps      PMR (polymyalgia rheumatica) (H)      Undiagnosed cardiac murmurs      Unspecified constipation      Unspecified essential hypertension        FAMILY HISTORY:  Family History   Problem Relation Age of Onset     CANCER Father      dec - esophageal and laryngeal     HEART DISEASE Mother      Respiratory Mother      dec       SOCIAL HISTORY:  Social History     Social History     Marital status:      Spouse name:      Number of children: 2     Years of education: N/A     Occupational History      Retired     Social History Main Topics     Smoking status: Never Smoker     Smokeless tobacco: Never Used     Alcohol use No       Comment: 6 times per year-one drink     Drug use: No     Sexual activity: Yes     Partners: Male     Other Topics Concern      Service No     Blood Transfusions No     Exercise Yes     curves     Seat Belt Yes     Self-Exams Yes     Parent/Sibling W/ Cabg, Mi Or Angioplasty Before 65f 55m? No     Social History Narrative    December 7, 2009    Balanced Diet - Yes    Osteoporosis Prevention Measures - Dairy servings per day: 2 and Medication/Supplements (See current meds)    Regular Exercise -  Yes Describe curves, walking    Dental Exam - YES - Date: 10/2009    Eye Exam - YES - Date: 2008    Self Breast Exam - Yes    Abuse: Current or Past (Physical, Sexual or Emotional)- No    Do you feel safe in your environment - Yes    Guns stored in the home - No    Sunscreen used - Yes    Seatbelts used - Yes    Lipids -  YES - Date: 11/24/2009    Glucose -  YES - Date: 11/24/2009    Colon Cancer Screening - Colonoscopy 11/18/2005(date completed)    Hemoccults - YES - Date: 10/12/2004    Pap Test -  YES - Date: 09/22/2004    Do you have any concerns about STD's -  No    Mammography - YES - Date: 11/24/2009    DEXA - YES - Date: 11/20/2008    Immunizations reviewed and up to date - Yes    PAULETTE Spencer, [facility]                   CURRENT MEDICATIONS:  Current Outpatient Prescriptions   Medication Sig Dispense Refill     irbesartan (AVAPRO) 300 MG tablet TAKE 1 TABLET EVERY DAY 90 tablet 0     propranolol (INDERAL LA) 60 MG 24 hr capsule Take 1 capsule (60 mg) by mouth daily 90 capsule 0     methimazole (TAPAZOLE) 5 MG tablet Take 1 tablet (5 mg) by mouth 3 times daily 270 tablet 0     ASPIRIN PO Take 81 mg by mouth At Bedtime       Calcium carb-Vitamin D 500 mg [place]-200 units (OSCAL WITH D;OYSTER SHELL CALCIUM) 500-200 MG-UNIT per tablet Take 1 tablet by mouth 2 times daily (with meals)       cycloSPORINE (RESTASIS) 0.05 % ophthalmic emulsion Place 1 drop into both eyes 2 times daily       polyethylene glycol 0.4%- propylene  "glycol 0.3% (SYSTANE) 0.4-0.3 % SOLN ophthalmic solution Place 1 drop into both eyes 3 times daily as needed for dry eyes       furosemide (LASIX) 20 MG tablet Take 1 tablet (20 mg) by mouth daily 30 tablet 3     spironolactone (ALDACTONE) 25 MG tablet Take 0.5 tablets (12.5 mg) by mouth daily 30 tablet 1     nitroGLYcerin (NITROSTAT) 0.4 MG sublingual tablet For chest pain place 1 tablet under the tongue every 5 minutes for 3 doses. If symptoms persist 5 minutes after 1st dose call 911. 25 tablet 3     CRANBERRY CONCENTRATE PO Take 1 capsule by mouth 2 times daily       lovastatin (MEVACOR) 40 MG tablet TAKE 1 TABLET AT BEDTIME (HYPERLIPIDEMIA LDL GOAL  BELOW 130) 90 tablet 2     omeprazole (PRILOSEC) 20 MG CR capsule Take 1 capsule (20 mg) by mouth daily 180 capsule 3     alendronate (FOSAMAX) 70 MG tablet TAKE 1 TABLET EVERY 7 DAYS AT LEAST 60 MIN BEFORE BREAKFAST AS DIRECTED \"SEE PACKAGE FOR ADDITIONAL INSTRUCTIONS\" 12 tablet 2     acetaminophen 650 MG TABS Take 650 mg by mouth every 8 hours as needed for mild pain or fever 100 tablet 0     polyethylene glycol (MIRALAX/GLYCOLAX) packet Take 17 g by mouth 2 times daily as needed for constipation 7 packet 0     Omega-3 Fatty Acids (FISH OIL) 500 MG CAPS Take 1 capsule by mouth 2 times daily        prednisoLONE acetate (PRED FORTE) 1 % ophthalmic suspension Place 1 drop into the right eye 4 times daily Taper as directed       melatonin 3 MG tablet Take 1 tablet (3 mg) by mouth nightly as needed for sleep 90 tablet 0     [DISCONTINUED] METHIMAZOLE PO Take 5 mg by mouth 3 times daily       [DISCONTINUED] propranolol (INDERAL LA) 60 MG 24 hr capsule Take 60 mg by mouth daily         ROS:  Constitutional: Denies fever, chills.  +diaphoresis. +Weight loss.  ENT: Denies visual disturbance, hearing loss, epistaxis, vertigo.  Respiratory: See HPI.    Cardiovascular: As per HPI.   GI: Denies nausea, vomiting, diarrhea, hematemesis, melena, or hematochezia.   : Denies " "urinary frequency, dysuria, or hematuria.   Integument: Negative for bruising, rash, or pruritis.  Psychiatric: +anxiety, +depression, +sleep disturbance,  +mood changes.   Neuro: Negative for headaches, syncope, numbness or tingling.   Endocrinology: +Hyperthyrodism   Musculoskeletal: Negative for gout, joint stiffness, swelling, or muscle weakness    EXAM:  /67 (BP Location: Left arm, Patient Position: Chair, Cuff Size: Adult Regular)  Pulse 51  Ht 1.461 m (4' 9.5\")  Wt 71.2 kg (157 lb)  SpO2 97%  BMI 33.39 kg/m2  General: alert, articulate, agitated and teary eyed throughout the interview.    HEENT: normocephalic, atraumatic, anicteric sclera, EOMI, normal palate, mucosa moist, no cyanosis.    Neck: neck supple.  No adenopathy, masses, or carotid bruits.  JVP 9-10 cm  Heart: regular rhythm, normal S1/S2, no murmur, gallop, rub.  Precordium quiet with normal PMI.     Lungs: clear, no rales, ronchi, or wheezing.  No accessory muscle use, respirations unlabored.   Abdomen: soft, non-tender, bowel sounds present, no hepatomegaly  Extremities: no clubbing, cyanosis or 2 +edema.  2+ pedal pulses.   Neurological: alert and oriented x 3.  Fine tremors of hands bilaterally noted.  Accelerated speech and agitated affect, no gross motor deficits  Skin:  No ecchymoses or rashes.    Labs:  CBC RESULTS:  Lab Results   Component Value Date    WBC 8.1 01/17/2018    RBC 4.13 01/17/2018    HGB 12.1 01/17/2018    HCT 37.1 01/17/2018    MCV 90 01/17/2018    MCH 29.3 01/17/2018    MCHC 32.6 01/17/2018    RDW 13.9 01/17/2018     01/17/2018       CMP RESULTS:  Lab Results   Component Value Date     (L) 01/17/2018    POTASSIUM 5.4 (H) 01/17/2018    CHLORIDE 96 01/17/2018    CO2 25 01/17/2018    ANIONGAP 8 01/17/2018     (H) 01/17/2018    BUN 27 01/17/2018    CR 1.04 01/17/2018    GFRESTIMATED 50 (L) 01/17/2018    GFRESTBLACK 61 01/17/2018    SHREYA 9.0 01/17/2018    BILITOTAL 0.5 12/13/2017    ALBUMIN 3.0 (L) " 2017    ALKPHOS 74 2017    ALT 85 (H) 2017    AST 76 (H) 2017        INR RESULTS:  Lab Results   Component Value Date    INR 1.71 (H) 2018       No components found for: CK  Lab Results   Component Value Date    MAG 2.1 2018     Lab Results   Component Value Date    NTBNP 2974 (H) 2017       Most recent echocardiogram:  Recent Results (from the past 8760 hour(s))   ECHO LIMITED WITH OPTISON    Narrative    [id number]  [id number]  [id number]  [id number]^JONATHAN^ROLANDA^           [facility],[place]  Echocardiography Laboratory  [address] [id number]     Name: QUIN VALDEZ  MRN: [id number]  : 1933  Study Date: 2018 11:12 AM  Age: 84 yrs  Gender: Female  Patient Location: [facility] [place]  Reason For Study: Afib  Ordering Physician: ROLANDA CASTILLO  Performed By: Yasmany Esteves RDCS     BSA: 1.7 m2  Height: 57 in  Weight: 176 lb  HR: 60  BP: 135/60 mmHg  _____________________________________________________________________________  __        Procedure  Limited Portable Echo Adult. Contrast Optison. Optison (NDC #2872-2660-55)  given intravenously. Patient was given 4 ml mixture of 3 ml Optison and 6 ml  saline. 5 ml wasted.  _____________________________________________________________________________  __        Interpretation Summary  Left ventricular function is normal.The EF is > 65%.  The inferior vena cava was normal in size with preserved respiratory  variability.  No pericardial effusion is present.  The left ventricular function has improved.  _____________________________________________________________________________  __        Left Ventricle  Left ventricular function is normal.The EF is > 65%. Left ventricular wall  thickness is normal. Left ventricular size is normal. Diastolic function not  assessed due to atrial fibrillation.     Right Ventricle  The right ventricle cannot be assessed.     Atria  The atria cannot  be assessed.     Mitral Valve  The mitral valve cannot be assessed. Mild mitral annular calcification is  present.        Aortic Valve  The aortic valve cannot be assessed.     Tricuspid Valve  The tricuspid valve cannot be assessed.     Pulmonic Valve  The pulmonic valve cannot be assessed.     Vessels  The aorta root cannot be assessed. The thoracic aorta cannot be assessed. The  pulmonary artery cannot be assessed. The inferior vena cava was normal in size  with preserved respiratory variability.     Pericardium  No pericardial effusion is present.        Compared to Previous Study  The left ventricular function has improved.     Attestation  I have personally viewed the imaging and agree with the interpretation and  report as documented by the fellow, bernardino rios, and/or edited by me.  _____________________________________________________________________________  __     MMode/2D Measurements & Calculations  IVSd: 1.0 cm  LVIDd: 4.6 cm  LVIDs: 3.2 cm  LVPWd: 0.91 cm  FS: 29.8 %  EDV(Teich): 96.8 ml  ESV(Teich): 41.6 ml  LV mass(C)d: 154.2 grams  LV mass(C)dI: 90.5 grams/m2  RWT: 0.40           _____________________________________________________________________________  __           Report approved by: Ade Razo 2018 12:39 PM      ECHO COMPLETE WITH OPTISON    Narrative    [id number]  [id number]  [id number]  [id number]^RETA^RICK^LISANDRA           [facility],[place]  Echocardiography Laboratory  [address] [place] [id number]     Name: QUIN VALDEZ  MRN: [id number]  : 1933  Study Date: 2017 09:39 AM  Age: 84 yrs  Gender: Female  Patient Location: [facility]  Reason For Study: Dyspnea  Ordering Physician: RICK MCDUFFIE  Performed By: ANIBAL See     BSA: 1.7 m2  Height: 58 in  Weight: 176 lb  BP: 131/85 mmHg  _____________________________________________________________________________  __        Procedure  Echocardiogram with two-dimensional, color and  spectral Doppler performed.  Contrast Optison. Optison (NDC #2570-8795-94) given intravenously. Patient was  given 6.0 ml mixture of 3 ml Optison and 6 ml saline. 3.0 ml wasted.  _____________________________________________________________________________  __        Interpretation Summary  Technically difficult study due to poor acoustic windows.     Normal left ventricular size with mild concentric hypertrophy.  Global akinesis with sparing of the basal segments consistent with stress  cardiomyopathy versus multivessel coronary artery disease. Moderately reduced  systolic function. EF 30-35%.  Normal right ventricular size and function.  Severe left atrial enlargement.  No pericardial effusion.     Compared to the prior study 10/31/15 the wall motion abnormalities and LV  dysfunction are new.  _____________________________________________________________________________  __        Left Ventricle  Left ventricular size is normal. Mild concentric wall thickening consistent  with left ventricular hypertrophy is present. Moderately (EF 30-35%) reduced  left ventricular function is present. Global akinesis with sparing of the  basal segments consistent with stress cardiomyopathy versus multivessel  coronary artery disease.     Right Ventricle  The right ventricle is normal size. Global right ventricular function is  normal.     Atria  The right atria appears normal. Severe left atrial enlargement is present.        Mitral Valve  The mitral valve is normal. Trace mitral insufficiency is present.     Aortic Valve  Moderate aortic valve calcification. Mild aortic insufficiency is present.  Mild aortic stenosis is present.     Tricuspid Valve  The tricuspid valve is normal. Trace tricuspid insufficiency is present. The  peak velocity of the tricuspid regurgitant jet is not obtainable.     Pulmonic Valve  The pulmonic valve is normal. Trace pulmonic insufficiency is present.     Vessels  The aorta root is normal. The  inferior vena cava was normal in size with  preserved respiratory variability. Estimated mean right atrial pressure is 3  mmHg.     Pericardium  No pericardial effusion is present.        Attestation  I have personally viewed the imaging and agree with the interpretation and  report as documented by the fellow, Chencho Chanel, and/or edited by me.  _____________________________________________________________________________  __     MMode/2D Measurements & Calculations  IVSd: 1.3 cm  LVIDd: 4.7 cm  LVIDs: 3.4 cm  LVPWd: 1.2 cm  FS: 27.4 %  EDV(Teich): 101.3 ml  ESV(Teich): 47.4 ml  LV mass(C)d: 219.4 grams  LV mass(C)dI: 127.2 grams/m2  Ao root diam: 2.5 cm  LVOT diam: 2.2 cm  LVOT area: 3.8 cm2     EF(MOD-bp): 31.6 %  LA Volume (BP): 88.7 ml  LA Volume Index (BP): 51.6 ml/m2     RWT: 0.51        Doppler Measurements & Calculations  MV E max tati: 127.8 cm/sec  Ao V2 max: 212.3 cm/sec  Ao max P.0 mmHg  Ao V2 mean: 147.3 cm/sec  Ao mean PG: 10.0 mmHg  Ao V2 VTI: 40.1 cm  PAUL(I,D): 1.7 cm2  PAUL(V,D): 1.7 cm2  LV V1 max PG: 3.6 mmHg  LV V1 max: 95.3 cm/sec  LV V1 VTI: 18.2 cm  SV(LVOT): 69.2 ml  SI(LVOT): 40.1 ml/m2  PAUL Index (cm2/m2): 1.0  E/E' av.7  Lateral E/e': 25.6  Medial E/e': 23.8        _____________________________________________________________________________  __        Report approved by: Ade Razo 2017 10:42 AM            Assessment and Plan:    In summary Dimple is an 84-year-old female with hyperthyroidism and stress-induced cardiomyopathy who appears to be doing well from a heart failure perspective.  Her sodium is low at 130, and her potassium is at the higher end at 4.8, therefore I stopped her Aldactone which I feel may be contributing to her hyponatremia.      Her primary concern today is her hyperthyroidism.  We spent a fair amount of time going over the symptoms of hyperthyroidism and I reassured her that the symptoms she was feeling were related to her hyperthyroidism.   Since she is feeling so miserable, I will attempt to get her in to see endocrine sooner.  In regards to the metallic taste in her mouth I do see that a metallic taste can be due to methimazole.  I will defer further management to her endocrinologist and her primary doctor.    She will return for a lab test to recheck her sodium and potassium levels in 1 week.  She will return to [facility] in 3 weeks with labs prior.  I did not see a reason why she needed to still stay with her daughter.  She does have neighbors and family that check on her frequently and seems to be doing well managing her medical care with some assistance from her family.  Both she and her daughter agreed with this plan.    1.  Chronic systolic heart failure (EF now >65% on 1/18 echo) secondary to stress-induced cardiomyopathy.  Stage C  NYHA Class II  ACEi/ARB: yes, Irbesartan 300 mg daily.  BB: yes, propanolol which she is taking for hyperthyroidism.  Aldosterone antagonist: Stop Aldactone secondary to hyponatremia.  Recheck BMP in one week.   SCD prophylaxis: does not meet criteria for implant  Fluid status: euvolemic  Anticoagulation: None.   Antiplatelet:  ASA dose 81 mg daily.   Sleep apnea: No  NSAID use:  Contraindicated.  Avoid use.    2.  Hyperthyroidism:  Treated with methimazole and  Propanolol.  Follow up with endocrinology as scheduled.  Will attempt to move up her appointment sooner as she is symptomatic.    3.  HTN:  135/67.  Taking Irbesartan and propanolol. Continue to monitor for now.     4.  Follow-up BMP in one week.  Endocrine as outlined above.  [facility] in 3 weeks with labs prior.      Lynn DAMON, CNP  [facility]   [phone]

## 2018-01-24 NOTE — MR AVS SNAPSHOT
"              After Visit Summary   2018    Dimple Oviedo    MRN: [id number]           Patient Information     Date Of Birth          1933        Visit Information        Provider Department      2018 2:00 PM Lynn Cyr NP [facility]        Today's Diagnoses     Chronic systolic heart failure (H)    -  1      Care Instructions    You were seen today in the Cardiovascular Clinic at the [facility].     Cardiology Providers you saw during your visit: Lynn Cyr APRN, CNP       1. Stop Aldactone (spironolactone)  2. Follow up in 3 weeks with CORE cardiology with labs prior.   3. Check labs in 1 week              Please limit your fluid intake to 2 L (64 ounces) daily.  2 Liters a day = 8.5 cups, or 72 ounces.  Please limit your salt intake to 2 grams a day or less.    If you gain 2# in 24 hours or 5# in one week call Kayli Olsen RN so we can adjust your medications as needed over the phone.    Please feel free to call me with any questions or concerns.      Kayli Olsen RN BSN   [facility]  Cardiology Care Coordinator-Heart Failure Clinic    Questions and [phone].   First press #1 for the University and then press #3 for \"Medical Questions\" to reach us Cardiology Nurses.     On Call Cardiologist for after hours or on weekends: [phone]   option #4 and ask to speak to the on-call Cardiologist. Inform them you are a CORE/heart failure patient at the [place].        If you need a medication refill please contact your pharmacy.  Please allow 3 business days for your refill to be completed.  _______________________________________________________  C.O.R.E. CLINIC Cardiomyopathy, Optimization, Rehabilitation, Education   The C.O.R.E. CLINIC is a heart failure specialty clinic within the [facility] where you will work with specialized nurse practitioners dedicated to helping patients with " heart failure carefully adjust medications, receive education, and learn who and when to call if symptoms develop. They specialize in helping you better understand your condition, slow the progression of your disease, improve the length and quality of your life, help you detect future heart problems before they become life threatening, and avoid hospitalizations.  As always, thank you for trusting us with your health care needs!                Follow-ups after your visit        Additional Services     Follow-Up with CORE Clinic       Follow up with CORE in 3 weeks with labs prior.                  Your next 10 appointments already scheduled     Feb 01, 2018  8:45 AM CST   (Arrive by 8:30 AM)   Cystoscopy with Kenna La MD   [facility] ([facility])    [address] [place] [address]  4th [facility] [id number] [phone]            Feb 07, 2018  1:20 PM CST   Office Visit with Pop Zapien MD   [facility] ([facility])    [address] [place] [id number] [phone]           Bring a current list of meds and any records pertaining to this visit. For Physicals, please bring immunization records and any forms needing to be filled out. Please arrive 10 minutes early to complete paperwork.            Feb 21, 2018  5:30 PM CST   (Arrive by 5:15 PM)   NEW ENDOCRINE with Cynthia Garcia MD   [facility] Endocrinology ([facility])    [address] [place] [address]  3rd [facility] [id number] [phone]            Feb 26, 2018 11:30 AM CST   Lab with  LAB   [facility] Lab ([facility])    [address] [place] [address]  1st [facility] [id number] [phone]            Feb 26, 2018 12:00 PM CST   (Arrive by 11:45 AM)   CORE RETURN with NELSON Smart [facility] Heart Care ([facility])     "[address] [facility]  [address] [place] [id number] [phone]              Future tests that were ordered for you today     Open Future Orders        Priority Expected Expires Ordered    Basic metabolic panel Routine 2/1/2018 2/3/2018 1/24/2018    Follow-Up with CORE Clinic Routine 2/14/2018 5/1/2018 1/24/2018            Who to contact     If you have questions or need follow up information about today's clinic visit or your schedule please contact [facility] directly at [phone].  Normal or non-critical lab and imaging results will be communicated to you by [email or web address], letter or phone within 4 business days after the clinic has received the results. If you do not hear from us within 7 days, please contact the clinic through [employer] or phone. If you have a critical or abnormal lab result, we will notify you by phone as soon as possible.  Submit refill requests through [employer] or call your pharmacy and they will forward the refill request to us. Please allow 3 business days for your refill to be completed.          Additional Information About Your Visit        [employer] Information     [employer] gives you secure access to your electronic health record. If you see a primary care provider, you can also send messages to your care team and make appointments. If you have questions, please call your primary care clinic.  If you do not have a primary care provider, please call [phone] and they will assist you.        Care EveryWhere ID     This is your Care EveryWhere ID. This could be used by other organizations to access your [place] medical records  [id number]        Your Vitals Were     Pulse Height Pulse Oximetry BMI (Body Mass Index)          51 1.461 m (4' 9.5\") 97% 33.39 kg/m2         Blood Pressure from Last 3 Encounters:   01/24/18 135/67   01/24/18 117/50   01/17/18 147/62    Weight from Last 3 Encounters:   01/24/18 71.2 kg (157 lb)   01/24/18 71.1 kg (156 lb 12 oz)   01/17/18 69.7 kg " (153 lb 11.2 oz)                 Today's Medication Changes          These changes are accurate as of 1/24/18  3:03 PM.  If you have any questions, ask your nurse or doctor.               Stop taking these medicines if you haven't already. Please contact your care team if you have questions.     spironolactone 25 MG tablet   Commonly known as:  ALDACTONE   Stopped by:  Lynn Cyr, HERMILA                Where to get your medicines      These medications were sent to [facility] [address] [place] [address] [address] [facility]  [address], [facility] [address]    Hours:  24-hours Phone:  [phone]     methimazole 5 MG tablet    propranolol 60 MG 24 hr capsule                Primary Care Provider Office Phone # Fax #    Pop Antonino Zapien [phone] [address] [place] [address]        Equal Access to Services     GUNNER RODRIGUEZ : Hadii shameka funko Sogabo, waaxda luqadaha, qaybta kaalmada adeurszulayacristian, maldonado seay . So [facility] [phone].    ATENCIÓN: Si petela español, tiene a sierra disposición servicios gratuitos de asistencia lingüística. [facility] [phone].    We comply with applicable federal civil rights laws and [place] laws. We do not discriminate on the basis of race, color, national origin, age, disability, sex, sexual orientation, or gender identity.            Thank you!     Thank you for choosing [facility]  for your care. Our goal is always to provide you with excellent care. Hearing back from our patients is one way we can continue to improve our services. Please take a few minutes to complete the written survey that you may receive in the mail after your visit with us. Thank you!             Your Updated Medication List - Protect others around you: Learn how to safely use, store and throw away your medicines at www.disposemymeds.org.          This list is accurate as of 1/24/18   "3:03 PM.  Always use your most recent med list.                   Brand Name Dispense Instructions for use Diagnosis    acetaminophen 650 MG 8 hour tablet     100 tablet    Take 650 mg by mouth every 8 hours as needed for mild pain or fever    Ascending cholangitis       alendronate 70 MG tablet    FOSAMAX    12 tablet    TAKE 1 TABLET EVERY 7 DAYS AT LEAST 60 MIN BEFORE BREAKFAST AS DIRECTED \"SEE PACKAGE FOR ADDITIONAL INSTRUCTIONS\"    High risk medication use, Osteopenia       ASPIRIN PO      Take 81 mg by mouth At Bedtime        Calcium carb-Vitamin D 500 mg [place]-200 units 500-200 MG-UNIT per tablet    OSCAL with D;Oyster Shell Calcium     Take 1 tablet by mouth 2 times daily (with meals)        CRANBERRY CONCENTRATE PO      Take 1 capsule by mouth 2 times daily        cycloSPORINE 0.05 % ophthalmic emulsion    RESTASIS     Place 1 drop into both eyes 2 times daily        Fish Oil 500 MG Caps      Take 1 capsule by mouth 2 times daily        furosemide 20 MG tablet    LASIX    30 tablet    Take 1 tablet (20 mg) by mouth daily    Elevated troponin       irbesartan 300 MG tablet    AVAPRO    90 tablet    TAKE 1 TABLET EVERY DAY    Essential hypertension with goal blood pressure less than 140/90       lovastatin 40 MG tablet    MEVACOR    90 tablet    TAKE 1 TABLET AT BEDTIME (HYPERLIPIDEMIA LDL GOAL  BELOW 130)    Hyperlipidemia LDL goal <130       melatonin 3 MG tablet     90 tablet    Take 1 tablet (3 mg) by mouth nightly as needed for sleep    Insomnia       methimazole 5 MG tablet    TAPAZOLE    270 tablet    Take 1 tablet (5 mg) by mouth 3 times daily    Nontoxic multinodular goiter       nitroGLYcerin 0.4 MG sublingual tablet    NITROSTAT    25 tablet    For chest pain place 1 tablet under the tongue every 5 minutes for 3 doses. If symptoms persist 5 minutes after 1st dose call 911.    Elevated troponin       omeprazole 20 MG CR capsule    priLOSEC    180 capsule    Take 1 capsule (20 mg) by mouth daily    " Gastroesophageal reflux disease without esophagitis       polyethylene glycol Packet    MIRALAX/GLYCOLAX    7 packet    Take 17 g by mouth 2 times daily as needed for constipation    Other constipation       prednisoLONE acetate 1 % ophthalmic susp    PRED FORTE     Place 1 drop into the right eye 4 times daily Taper as directed        propranolol 60 MG 24 hr capsule    INDERAL LA    90 capsule    Take 1 capsule (60 mg) by mouth daily    Nontoxic multinodular goiter       SYSTANE 0.4-0.3 % Soln ophthalmic solution   Generic drug:  polyethylene glycol 0.4%- propylene glycol 0.3%      Place 1 drop into both eyes 3 times daily as needed for dry eyes

## 2018-01-25 ENCOUNTER — OFFICE VISIT (OUTPATIENT)
Dept: BEHAVIORAL HEALTH | Facility: CLINIC | Age: 83
End: 2018-01-25
Payer: MEDICARE

## 2018-01-25 ENCOUNTER — TELEPHONE (OUTPATIENT)
Dept: FAMILY MEDICINE | Facility: CLINIC | Age: 83
End: 2018-01-25

## 2018-01-25 DIAGNOSIS — F41.9 ANXIETY: ICD-10-CM

## 2018-01-25 DIAGNOSIS — D50.9 IRON DEFICIENCY ANEMIA, UNSPECIFIED IRON DEFICIENCY ANEMIA TYPE: Primary | ICD-10-CM

## 2018-01-25 DIAGNOSIS — F43.23 ADJUSTMENT DISORDER WITH MIXED ANXIETY AND DEPRESSED MOOD: Primary | ICD-10-CM

## 2018-01-25 PROBLEM — I48.91 ATRIAL FIBRILLATION, UNSPECIFIED TYPE (H): Status: ACTIVE | Noted: 2018-01-25

## 2018-01-25 LAB — COPATH REPORT: NORMAL

## 2018-01-25 PROCEDURE — 99207 ZZC NO CHARGE BEHAVIORAL WARM HANDOFF: CPT | Performed by: SOCIAL WORKER

## 2018-01-25 RX ORDER — FERROUS SULFATE 325(65) MG
325 TABLET ORAL
COMMUNITY
Start: 2018-01-25 | End: 2018-03-09

## 2018-01-25 RX ORDER — SERTRALINE HYDROCHLORIDE 25 MG/1
25 TABLET, FILM COATED ORAL DAILY
Qty: 30 TABLET | Refills: 0 | Status: SHIPPED | OUTPATIENT
Start: 2018-01-25 | End: 2018-02-06

## 2018-01-25 ASSESSMENT — ANXIETY QUESTIONNAIRES: GAD7 TOTAL SCORE: 18

## 2018-01-25 NOTE — TELEPHONE ENCOUNTER
Called patient who stated:  1. Diarrhea episodes just this AM   A. Has not had diarrhea for 2 hours and thinks diarrhea is resolved  2. No constipation, but black stools   A. Aware iron supplement can cause black stools  3. Feels she is on so many medications, can she take iron supplement every other day, since hemoglobin is normal?   A. Patient insistent her iron levels were just checked last week-but writer unable to locate iron/ferritin level since 11/30/17  4. Cardiologist told her she should start anti-depressant right away    Writer explained to patient Dr. Zapien will need to advise on which anti-depressant he recommend she start, since he saw her in office visit.    Patient verbalized understanding, stated she will wait for Dr. Zapien's input when he returns to clinic on 1/29/18.    Writer encouraged patient to call back if diarrhea recurs.      Dr. Zapien-Please advise.    Thank you!  ITALO Gill, SHASHANKN, RN

## 2018-01-25 NOTE — TELEPHONE ENCOUNTER
Agree with plan below to triage diarrhea and continue iron supplement, I do not think it is causing diarrhea.    Will need to defer antidepressant to PCP as I dont see a specific formulation mentioned in his note.      Evon Arroyo, CNP

## 2018-01-25 NOTE — TELEPHONE ENCOUNTER
Per 12/4/17 telephone encounter, patient was to continue current dose of iron supplement.    Writer did add ferrous sulfate to active med list.    Covering providers-Please review and advise.  Writer planned on triaging the diarrhea, as writer understands iron supplement usually results in constipation.  Writer also planned on informing patient:  1. Iron supplement can cause dark colored stool  2. Dr. Zapien will need to advise on which anti-depressant medication he recommends she start, as there are many options to consider    Thank you!  ITALO iGll, SHASHANKN, RN

## 2018-01-25 NOTE — TELEPHONE ENCOUNTER
The patient is taking an iron supplement and reports black stools and has had diarrhea for about a day. The patient is wondering if she can decrease the dose.  The patient is also requesting an anti depressant medication as she saw her PCP yesterday and wasn't sure if she wanted to take it.  Pharmacy is loaded however PCP is out of the clinic until 1/29, the patient has been advised of this as well.

## 2018-01-25 NOTE — PROGRESS NOTES
"Patient had appointment with his/her primary care physician. [facility] services were requested. Explained the role of the [facility] and provided informational handout and contact information for the [facility].   Patient had a follow-up appointment with her primary care physician.  Patient presented as tearful, anxious.  [facility] services were requested.  Patient present with her daughter-in-law.  Patient has had several patient admits passive suicidal thoughts medical complications the past few months which is impacting her quality of life.  Patient had a stroke in December which is required her to move in with her son and daughter-in-law.  Patient states it is hard to be vulnerable.  Patient states she has been independent for the past 25 years since her  passed away.  In addition, patient reports she is unable to drive for the past 2 months.  Patient reports she has her own routine but feels \"useless\" at her son's house is just sits around.  \"I just sit around and do nothing\".  Patient states she is active on different boards and enjoys taking care of her apartment.  Patient lives in a co-op states she often goes downstairs interaction with other neighbors.  Patient states she does not fear death but does ruminate at night about her medical conditions and all the possible scenarios.  Patient reports she cries most the days, has low energy, difficulty concentrating and has difficulty falling asleep.  Patient reports \"my mind is racing the thousand thoughts\".  Patient denies a history of depression or anxiety.  However, patient's daughter-in-law states she worries about the family identifies herself  as the matriarch. patient admits passive suicidal thoughts   As feels \"useless\" patient denies any intent or plan.. No history of suicide attempt.  Patient daughter-in-law feels patient is safe.    Patient states she is meeting with her cardiologist later today and is hoping she will be able to return to her apartment.  Patient felt " having familiar surroundings in getting back in routine would be helpful to refer her to adjust.  Discussed with patient the role of counseling provided contact information of [facility].  In addition, discussed to have a care coordinator at the [facility] that could assist with additional supports her cardiologist felt she was stable enough to return home.    .[facility] consulted with primary care physician

## 2018-01-25 NOTE — MR AVS SNAPSHOT
After Visit Summary   1/25/2018    Dimple Oviedo    MRN: [id number]           Patient Information     Date Of Birth          6/23/1933        Visit Information        Provider Department      1/25/2018 7:52 AM Butch Cortez, [facility]        Today's Diagnoses     Adjustment disorder with mixed anxiety and depressed mood    -  1       Follow-ups after your visit        Your next 10 appointments already scheduled     Jan 27, 2018 11:00 AM CST   (Arrive by 10:45 AM)   NEW ENDOCRINE with  Med Diab Endo Provider   [facility] Endocrinology ([facility])    [address] [place] [address]  3rd Floor  [facility] [id number] [phone]            Feb 01, 2018  8:45 AM CST   (Arrive by 8:30 AM)   Cystoscopy with Kenna La MD   [facility] Urology and [facility] ([facility])    [address] [place] [address]  4th [facility] [id number] [phone]            Feb 07, 2018  1:20 PM CST   Office Visit with Pop Zapien MD   [facility] ([facility])    [address] [place] [address] [phone]           Bring a current list of meds and any records pertaining to this visit. For Physicals, please bring immunization records and any forms needing to be filled out. Please arrive 10 minutes early to complete paperwork.            Feb 26, 2018 11:30 AM CST   Lab with  LAB   [facility] Lab ([facility])    [address] [place] [address]  1st [facility] [id number] [phone]            Feb 26, 2018 12:00 PM CST   (Arrive by 11:45 AM)   CORE RETURN with NELSON Smart [facility] Heart Care ([facility])    [address] [place] [id number] [phone]              Future tests that were ordered for you today     Open Future Orders        Priority Expected Expires  Ordered    Basic metabolic panel Routine 2/1/2018 2/3/2018 1/24/2018    Follow-Up with CORE Clinic Routine 2/14/2018 5/1/2018 1/24/2018            Who to contact     If you have questions or need follow up information about today's clinic visit or your schedule please contact [facility] directly at [phone].  Normal or non-critical lab and imaging results will be communicated to you by MyChart, letter or phone within 4 business days after the clinic has received the results. If you do not hear from us within 7 days, please contact the clinic through [employer] or phone. If you have a critical or abnormal lab result, we will notify you by phone as soon as possible.  Submit refill requests through [employer] or call your pharmacy and they will forward the refill request to us. Please allow 3 business days for your refill to be completed.          Additional Information About Your Visit        [employer] Information     [employer] gives you secure access to your electronic health record. If you see a primary care provider, you can also send messages to your care team and make appointments. If you have questions, please call your primary care clinic.  If you do not have a primary care provider, please call [phone] and they will assist you.        Care EveryWhere ID     This is your Care EveryWhere ID. This could be used by other organizations to access your [place] medical records  [id number]         Blood Pressure from Last 3 Encounters:   01/24/18 135/67   01/24/18 117/50   01/17/18 147/62    Weight from Last 3 Encounters:   01/24/18 71.2 kg (157 lb)   01/24/18 71.1 kg (156 lb 12 oz)   01/17/18 69.7 kg (153 lb 11.2 oz)              Today, you had the following     No orders found for display       Primary Care Provider Office Phone # Fax #    Pop Zapien [phone] [address] [place] [address]        Equal Access to Services     GUNNER RODRIGUEZ AH: Hadii shameka ku  "deniadarcyjacquie Jeremiahelkin, costada luqadaha, qaybta kagus chávez, maldonado dayain hayaan basiliaurszula kelseagordy lamaryabdias sotero. So [facility] [phone].    ATENCIÓN: Si soni alanis, tiene a sierra disposición servicios gratuitos de asistencia lingüística. Al al [phone].    We comply with applicable federal civil rights laws and [place] laws. We do not discriminate on the basis of race, color, national origin, age, disability, sex, sexual orientation, or gender identity.            Thank you!     Thank you for choosing [facility]  for your care. Our goal is always to provide you with excellent care. Hearing back from our patients is one way we can continue to improve our services. Please take a few minutes to complete the written survey that you may receive in the mail after your visit with us. Thank you!             Your Updated Medication List - Protect others around you: Learn how to safely use, store and throw away your medicines at www.disposemymeds.org.          This list is accurate as of 1/25/18  8:00 AM.  Always use your most recent med list.                   Brand Name Dispense Instructions for use Diagnosis    acetaminophen 650 MG 8 hour tablet     100 tablet    Take 650 mg by mouth every 8 hours as needed for mild pain or fever    Ascending cholangitis       alendronate 70 MG tablet    FOSAMAX    12 tablet    TAKE 1 TABLET EVERY 7 DAYS AT LEAST 60 MIN BEFORE BREAKFAST AS DIRECTED \"SEE PACKAGE FOR ADDITIONAL INSTRUCTIONS\"    High risk medication use, Osteopenia       ASPIRIN PO      Take 81 mg by mouth At Bedtime        Calcium carb-Vitamin D 500 mg [place]-200 units 500-200 MG-UNIT per tablet    OSCAL with D;Oyster Shell Calcium     Take 1 tablet by mouth 2 times daily (with meals)        CRANBERRY CONCENTRATE PO      Take 1 capsule by mouth 2 times daily        cycloSPORINE 0.05 % ophthalmic emulsion    RESTASIS     Place 1 drop into both eyes 2 times daily        Fish Oil 500 MG Caps      Take 1 capsule by " mouth 2 times daily        furosemide 20 MG tablet    LASIX    30 tablet    Take 1 tablet (20 mg) by mouth daily    Elevated troponin       irbesartan 300 MG tablet    AVAPRO    90 tablet    TAKE 1 TABLET EVERY DAY    Essential hypertension with goal blood pressure less than 140/90       lovastatin 40 MG tablet    MEVACOR    90 tablet    TAKE 1 TABLET AT BEDTIME (HYPERLIPIDEMIA LDL GOAL  BELOW 130)    Hyperlipidemia LDL goal <130       melatonin 3 MG tablet     90 tablet    Take 1 tablet (3 mg) by mouth nightly as needed for sleep    Insomnia       methimazole 5 MG tablet    TAPAZOLE    270 tablet    Take 1 tablet (5 mg) by mouth 3 times daily    Nontoxic multinodular goiter       nitroGLYcerin 0.4 MG sublingual tablet    NITROSTAT    25 tablet    For chest pain place 1 tablet under the tongue every 5 minutes for 3 doses. If symptoms persist 5 minutes after 1st dose call 911.    Elevated troponin       omeprazole 20 MG CR capsule    priLOSEC    180 capsule    Take 1 capsule (20 mg) by mouth daily    Gastroesophageal reflux disease without esophagitis       polyethylene glycol Packet    MIRALAX/GLYCOLAX    7 packet    Take 17 g by mouth 2 times daily as needed for constipation    Other constipation       prednisoLONE acetate 1 % ophthalmic susp    PRED FORTE     Place 1 drop into the right eye 4 times daily Taper as directed        propranolol 60 MG 24 hr capsule    INDERAL LA    90 capsule    Take 1 capsule (60 mg) by mouth daily    Nontoxic multinodular goiter       SYSTANE 0.4-0.3 % Soln ophthalmic solution   Generic drug:  polyethylene glycol 0.4%- propylene glycol 0.3%      Place 1 drop into both eyes 3 times daily as needed for dry eyes

## 2018-01-26 NOTE — TELEPHONE ENCOUNTER
Patient informed Zoloft script sent to [facility] in [place] by Dr. Zapien.    Patient verbalized understanding and in agreement with plan.  SHASHANK MurrellN, RN

## 2018-01-26 NOTE — TELEPHONE ENCOUNTER
With her anxiety and worry about side effects I would like to wait until I see her next week to start medication as we did not discuss any specific medication when I talked to her last and we talked about SSRI side effects. If she is really nervous (or family member) then just route it back to me and I can start her on 25mg of zoloft per day and gradually go up on the dose after I see her.

## 2018-01-27 ENCOUNTER — OFFICE VISIT (OUTPATIENT)
Dept: ENDOCRINOLOGY | Facility: CLINIC | Age: 83
End: 2018-01-27
Payer: MEDICARE

## 2018-01-27 VITALS
WEIGHT: 154.7 LBS | SYSTOLIC BLOOD PRESSURE: 129 MMHG | DIASTOLIC BLOOD PRESSURE: 63 MMHG | HEART RATE: 63 BPM | BODY MASS INDEX: 32.47 KG/M2 | HEIGHT: 58 IN

## 2018-01-27 DIAGNOSIS — E05.90 HYPERTHYROIDISM: ICD-10-CM

## 2018-01-27 DIAGNOSIS — I50.22 CHRONIC SYSTOLIC HEART FAILURE (H): ICD-10-CM

## 2018-01-27 DIAGNOSIS — E05.90 HYPERTHYROIDISM: Primary | ICD-10-CM

## 2018-01-27 LAB
ANION GAP SERPL CALCULATED.3IONS-SCNC: 7 MMOL/L (ref 3–14)
BUN SERPL-MCNC: 16 MG/DL (ref 7–30)
CALCIUM SERPL-MCNC: 8.6 MG/DL (ref 8.5–10.1)
CHLORIDE SERPL-SCNC: 96 MMOL/L (ref 94–109)
CO2 SERPL-SCNC: 26 MMOL/L (ref 20–32)
CREAT SERPL-MCNC: 0.92 MG/DL (ref 0.52–1.04)
GFR SERPL CREATININE-BSD FRML MDRD: 58 ML/MIN/1.7M2
GLUCOSE SERPL-MCNC: 102 MG/DL (ref 70–99)
POTASSIUM SERPL-SCNC: 4.8 MMOL/L (ref 3.4–5.3)
SODIUM SERPL-SCNC: 130 MMOL/L (ref 133–144)
T3 SERPL-MCNC: 104 NG/DL (ref 60–181)
T4 FREE SERPL-MCNC: 0.87 NG/DL (ref 0.76–1.46)
TSH SERPL DL<=0.005 MIU/L-ACNC: 0.1 MU/L (ref 0.4–4)

## 2018-01-27 PROCEDURE — 84480 ASSAY TRIIODOTHYRONINE (T3): CPT | Performed by: NURSE PRACTITIONER

## 2018-01-27 ASSESSMENT — PAIN SCALES - GENERAL: PAINLEVEL: NO PAIN (0)

## 2018-01-27 NOTE — PROGRESS NOTES
This 84-year-old woman was seen and examined by me with medical student Talia Gil, who served as scribe.Please see student's note of the same date for full details.      Mrs. Oviedo was accompanied by her son and daughter-in-law.  She reports that she has had thyroid problems since the 1960s when a thyroid nodule was removed.  She first developed symptoms of hyperthyroidism in December.  She was most troubled by the tremor that occurred.  She has not noticed change in her skin or hair.  She saw Dr. Rojas in the community and he started her on methimazole.  She has been taking 5 mg 3 times daily but does not think it is helped much with her symptoms.    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  She was readmitted in January because of palpitations.  She was put on a beta blocker at that time and this is helped with the symptoms of palpitations.  She does not have them today.  She reports that her breathing is fine.  She does get tired when she walks a distance.  She has regular f/u with cardiology.    She also has had some hematuria and will be seen by urology soon.    Patient Active Problem List   Diagnosis     Esophageal reflux     restless leg     heat intolerance     Goiter     Disorder of bone and cartilage     Other psoriasis     perirectal cyst     Malignant melanoma of skin of trunk, except scrotum (H)     Undiagnosed cardiac murmurs     Nontoxic multinodular goiter     Hyperlipidemia LDL goal <130     Hypertension goal BP (blood pressure) < 140/90     Urinary incontinence     Osteoarthritis of left knee     Hip arthritis     Polymyalgia rheumatica (H)     High risk medication use     Shoulder pain     Sepsis (H)     Impaired fasting blood sugar     Chronic bilateral low back pain without sciatica     Obesity, unspecified obesity severity, unspecified obesity type     Iron deficiency anemia, unspecified iron  "deficiency anemia type     NSTEMI (non-ST elevated myocardial infarction) (H)     Stress-induced cardiomyopathy     A-fib (H)     Atrial fibrillation, unspecified type (H)           Current Outpatient Prescriptions on File Prior to Visit:  ferrous sulfate (IRON) 325 (65 FE) MG tablet Take 1 tablet (325 mg) by mouth daily (with breakfast)   sertraline (ZOLOFT) 25 MG tablet Take 1 tablet (25 mg) by mouth daily   irbesartan (AVAPRO) 300 MG tablet TAKE 1 TABLET EVERY DAY   propranolol (INDERAL LA) 60 MG 24 hr capsule Take 1 capsule (60 mg) by mouth daily   methimazole (TAPAZOLE) 5 MG tablet Take 1 tablet (5 mg) by mouth 3 times daily   ASPIRIN PO Take 81 mg by mouth At Bedtime   Calcium carb-Vitamin D 500 mg [place]-200 units (OSCAL WITH D;OYSTER SHELL CALCIUM) 500-200 MG-UNIT per tablet Take 1 tablet by mouth 2 times daily (with meals)   cycloSPORINE (RESTASIS) 0.05 % ophthalmic emulsion Place 1 drop into both eyes 2 times daily   polyethylene glycol 0.4%- propylene glycol 0.3% (SYSTANE) 0.4-0.3 % SOLN ophthalmic solution Place 1 drop into both eyes 3 times daily as needed for dry eyes   furosemide (LASIX) 20 MG tablet Take 1 tablet (20 mg) by mouth daily   nitroGLYcerin (NITROSTAT) 0.4 MG sublingual tablet For chest pain place 1 tablet under the tongue every 5 minutes for 3 doses. If symptoms persist 5 minutes after 1st dose call 911.   CRANBERRY CONCENTRATE PO Take 1 capsule by mouth 2 times daily   lovastatin (MEVACOR) 40 MG tablet TAKE 1 TABLET AT BEDTIME (HYPERLIPIDEMIA LDL GOAL  BELOW 130)   omeprazole (PRILOSEC) 20 MG CR capsule Take 1 capsule (20 mg) by mouth daily   alendronate (FOSAMAX) 70 MG tablet TAKE 1 TABLET EVERY 7 DAYS AT LEAST 60 MIN BEFORE BREAKFAST AS DIRECTED \"SEE PACKAGE FOR ADDITIONAL INSTRUCTIONS\"   acetaminophen 650 MG TABS Take 650 mg by mouth every 8 hours as needed for mild pain or fever   polyethylene glycol (MIRALAX/GLYCOLAX) packet Take 17 g by mouth 2 times daily as needed for constipation " "  Omega-3 Fatty Acids (FISH OIL) 500 MG CAPS Take 1 capsule by mouth 2 times daily    prednisoLONE acetate (PRED FORTE) 1 % ophthalmic suspension Place 1 drop into the right eye 4 times daily Taper as directed   melatonin 3 MG tablet Take 1 tablet (3 mg) by mouth nightly as needed for sleep     No current facility-administered medications on file prior to visit.     ROS: 10 point ROS neg other than the symptoms noted above in the HPI.    Fm Hx -daughter and mother had thyroid disease.    Social history: She lives in a co-op but has been staying with her family since her hospitalization.  Is eager to go home today.      Vital signs:   /63 (BP Location: Right arm, Patient Position: Sitting, Cuff Size: Adult Regular)  Pulse 63  Ht 1.461 m (4' 9.5\")  Wt 70.2 kg (154 lb 11.2 oz)  BMI 32.9 kg/m2  Estimated body mass index is 32.9 kg/(m^2) as calculated from the following:    Height as of this encounter: 1.461 m (4' 9.5\").    Weight as of this encounter: 70.2 kg (154 lb 11.2 oz).    NAD  Eyes - no periorbital edema, conjunctival injection, scleral icterus.  No evidence of lid lag, proptosis. EOM full  Neck -thyroid gland about twice normal in size.  No nodules palpated.  Gland is soft  Lungs - clear  CV - RRR.  Normal S1, S2 w/o murmur or gallop.  No edema  Abd - NBS, soft and non-tender without hepatomegaly or masses  Neuro -  DTR 2/4 biceps  Skin - normal texture. No pretibial myxedema seen    ENDO THYROID LABS-[facility] Latest Ref Rng & Units 1/17/2018 1/16/2018   TSH 0.40 - 4.00 mU/L  <0.01 (L)   T4 FREE 0.76 - 1.46 ng/dL  1.41   FREE T3 2.3 - 4.2 pg/mL 3.1    TRIIODOTHYRONINE(T3) 60 - 181 ng/dL     THYROGLOBULIN ANTIBODY <40 IU/mL     THYR PEROXIDASE ZARA <35 IU/mL       ENDO THYROID LABS-[facility] Latest Ref Rng & Units 11/30/2017 10/6/2017   TSH 0.40 - 4.00 mU/L <0.01 (L) 0.12 (L)   T4 FREE 0.76 - 1.46 ng/dL 2.11 (H) 0.89   FREE T3 2.3 - 4.2 pg/mL     TRIIODOTHYRONINE(T3) 60 - 181 ng/dL     THYROGLOBULIN ANTIBODY <40 " IU/mL     THYR PEROXIDASE ZARA <35 IU/mL       ENDO THYROID LABS-[facility] Latest Ref Rng & Units 11/30/2011   TSH 0.40 - 4.00 mU/L 0.43   T4 FREE 0.76 - 1.46 ng/dL    FREE T3 2.3 - 4.2 pg/mL    TRIIODOTHYRONINE(T3) 60 - 181 ng/dL    THYROGLOBULIN ANTIBODY <40 IU/mL    THYR PEROXIDASE ZARA <35 IU/mL        EXAMINATION: Thyroid ultrasound, 12/18/2017 1:49 PM      COMPARISON: Chest CT 12/13/2017, thyroid ultrasound 10/26/2006     HISTORY: The referring physician requested comparison to I123 scan -  to be done prior to this U/S Multinodular goiter; however the patient  deferred the nuclear medicine exam secondary to recent myocardial  infarction.     Technique: Grayscale and color ultrasound imaging of the thyroid was  performed.     Findings:    The thyroid is enlarged and diffusely heterogeneous with the  parenchyma composed of innumerable nodules, predominantly solid with  some cystic changes well. There are coarse, dystrophic appearing  calcifications on the left as well as punctate echogenic foci, many of  which demonstrate ring down/comet tail artifact, likely inspissated  colloid, with more pronounced associated cysts seen on prior  ultrasound of 10/2006 in the left lobe of the thyroid gland. There are  additional punctate echogenic foci in both lobes which may represent  microcalcifications. Multiple nodules demonstrate hypervascularity.     The right lobe of the thyroid measures: 4.4 x 3.3 x 5.1 cm.      The thyroid isthmus measures: 0.6 cm.     The left lobe of the thyroid measures: 2.4 x 3.3 x 5.3 cm.          Impression:  Enlarged heterogeneous thyroid gland composed of innumerable nodules,  predominantly solid with some cystic changes well. Coarse, dystrophic  appearing calcifications on the left as well as punctate echogenic  foci bilaterally, likely inspissated colloid with possible  microcalcifications. Comparison to I-123 scan was requested however  the patient declined that exam.     I have personally  reviewed the examination and initial interpretation  and I agree with the findings.     KASANDRA WALKER MD     US reviewed by me    Assessment and plan:    This 84-year-old woman developed symptoms of hyperthyroidism last fall and and was found to have hyperthyroidism.  The cause of the hyperthyroidism is not immediately clear.  She has a multinodular goiter on ultrasound but does not appear to have one dominant nodule.  Despite this it is possible that she does have an autonomous nodule.  Graves' disease is also in the differential diagnosis although she has no evidence of eye findings.  Will measure a TSI today.  If it is positive, that will support the diagnosis of Graves' disease.  For now, will adjust her methimazole dose to make certain her thyroid function tests are in the normal range.  I did discuss the treatments for a hot nodule, multinodular goiter, and Graves' disease.  All can managed with methimazole or radioactive iodine.  Once we relieve her symptoms, we may want to consider stopping the methimazole and treating her with radioactive iodine.  If the TSI is negative, we will need to do a radioactive iodine scan before we consider ablation so we can confirm the diagnosis.    I will schedule her back for follow-up and I see the results of the labs.

## 2018-01-27 NOTE — NURSING NOTE
"Chief Complaint   Patient presents with     Consult     HYPERTHYROIDISM CONSULTATION        Initial /63 (BP Location: Right arm, Patient Position: Sitting, Cuff Size: Adult Regular)  Pulse 63  Ht 1.461 m (4' 9.5\")  Wt 70.2 kg (154 lb 11.2 oz)  BMI 32.9 kg/m2 Estimated body mass index is 32.9 kg/(m^2) as calculated from the following:    Height as of this encounter: 1.461 m (4' 9.5\").    Weight as of this encounter: 70.2 kg (154 lb 11.2 oz).  Medication Reconciliation: complete    "

## 2018-01-27 NOTE — PROGRESS NOTES
Dimple Oviedo is an 84-year-old woman who is establishing her endocrinology care at this clinic. She was previously seen by Dr. Neymar Hernadez at [facility] in [place].    Dimple has a history of goiter and thyroid nodule going back to the 1960's, when she had a thyroid nodule removed. In the past 4-6 weeks, she reports that she has had jitters of her hands and body, increased heart rate and palpitations, difficulty falling asleep and staying asleep, low energy, and hot flashes. She also has had diarrhea for the past week, sometimes several times per day. Dimple has a family history of thyroid problems. She reports that her mother had a large goiter, and her daughter has had hypothyroidism since age 6.    Dimple was started on methimazole (5mg, 3 times per day), and propranolol at the end of December 2017 to control her thyroid symptoms. At that time, she had a thyroid US that showed an enlarged, diffusely heterogeneous thyroid with many nodules, as well as elevated T4 and low TSH.    ENDO THYROID LABS-[facility] Latest Ref Rng & Units 1/17/2018 1/16/2018   TSH 0.40 - 4.00 mU/L  <0.01 (L)   T4 FREE 0.76 - 1.46 ng/dL  1.41   FREE T3 2.3 - 4.2 pg/mL 3.1    TRIIODOTHYRONINE(T3) 60 - 181 ng/dL     THYROGLOBULIN ANTIBODY <40 IU/mL     THYR PEROXIDASE ZARA <35 IU/mL       ENDO THYROID LABS-[facility] Latest Ref Rng & Units 11/30/2017 10/6/2017   TSH 0.40 - 4.00 mU/L <0.01 (L) 0.12 (L)   T4 FREE 0.76 - 1.46 ng/dL 2.11 (H) 0.89   FREE T3 2.3 - 4.2 pg/mL     TRIIODOTHYRONINE(T3) 60 - 181 ng/dL     THYROGLOBULIN ANTIBODY <40 IU/mL     THYR PEROXIDASE ZARA <35 IU/mL           Dimple was recently hospitalized for an exacerbation of her congestive heart failure, and has been staying with her son and daughter-in-law since discharge. She will be moving back to her co-op apartment today. She expressed some fear about living alone because of her heart condition, but is excited to be back in her own home. She is very frustrated  with her thyroid symptoms.        Patient Active Problem List   Diagnosis     Esophageal reflux     restless leg     heat intolerance     Goiter     Disorder of bone and cartilage     Other psoriasis     perirectal cyst     Malignant melanoma of skin of trunk, except scrotum (H)     Undiagnosed cardiac murmurs     Nontoxic multinodular goiter     Hyperlipidemia LDL goal <130     Hypertension goal BP (blood pressure) < 140/90     Urinary incontinence     Osteoarthritis of left knee     Hip arthritis     Polymyalgia rheumatica (H)     High risk medication use     Shoulder pain     Sepsis (H)     Impaired fasting blood sugar     Chronic bilateral low back pain without sciatica     Obesity, unspecified obesity severity, unspecified obesity type     Iron deficiency anemia, unspecified iron deficiency anemia type     NSTEMI (non-ST elevated myocardial infarction) (H)     Stress-induced cardiomyopathy     A-fib (H)     Atrial fibrillation, unspecified type (H)       Current Outpatient Prescriptions   Medication     ferrous sulfate (IRON) 325 (65 FE) MG tablet     sertraline (ZOLOFT) 25 MG tablet     irbesartan (AVAPRO) 300 MG tablet     propranolol (INDERAL LA) 60 MG 24 hr capsule     methimazole (TAPAZOLE) 5 MG tablet     ASPIRIN PO     Calcium carb-Vitamin D 500 mg [place]-200 units (OSCAL WITH D;OYSTER SHELL CALCIUM) 500-200 MG-UNIT per tablet     cycloSPORINE (RESTASIS) 0.05 % ophthalmic emulsion     polyethylene glycol 0.4%- propylene glycol 0.3% (SYSTANE) 0.4-0.3 % SOLN ophthalmic solution     furosemide (LASIX) 20 MG tablet     nitroGLYcerin (NITROSTAT) 0.4 MG sublingual tablet     CRANBERRY CONCENTRATE PO     lovastatin (MEVACOR) 40 MG tablet     omeprazole (PRILOSEC) 20 MG CR capsule     alendronate (FOSAMAX) 70 MG tablet     acetaminophen 650 MG TABS     polyethylene glycol (MIRALAX/GLYCOLAX) packet     Omega-3 Fatty Acids (FISH OIL) 500 MG CAPS     prednisoLONE acetate (PRED FORTE) 1 % ophthalmic suspension      "melatonin 3 MG tablet     No current facility-administered medications for this visit.        Physical Exam:  /63 (BP Location: Right arm, Patient Position: Sitting, Cuff Size: Adult Regular)  Pulse 63  Ht 1.461 m (4' 9.5\")  Wt 70.2 kg (154 lb 11.2 oz)  BMI 32.9 kg/m2  General: Well-appearing, but upset about recent thyroid symptoms  Eyes: No proptosis or periorbital edema  Lungs: Clear to auscultation  Heart: Regular rate and rhythm  Extremities: Ankle swelling secondary to congestive heart failure; Bilateral hand tremors    Review Of Systems  Skin: Dry mouth  Eyes: No proptosis or periorbital edema noted; No changes in vision  Respiratory: Shortness of breath on exertion, but is able to walk to the elevator  Cardiovascular: Increased heart rate and palpitations  Gastrointestinal: Diarrhea for one week; Black stool secondary to iron therapy  Genitourinary: Blood in urine since December; This is being followed and cystoscopy is scheduled for Feb. 1st  Musculoskeletal: Hand tremors bilaterally; History of arthritis, but pain is controlled  Neurologic: No headaches, dizziness   Psychiatric: Dimple is overwhelmed and frustrated by all of the problems she is having recently. She is looking forward to getting back to living in her own apartment.   Endocrine: Periodic hot flashes; Recent weight loss, may be due to changes in diuretics      Assessment and Plan:  1. Hyperthyroidism. We discussed with Dimple that her symptoms could be due to either Graves' Disease or a toxic thyroid nodule. Today we will test her thyroid function, and test for thyroid-stimulating antibodies to rule out Graves' Disease. Will plan to adjust methimazole according to thyroid results for better symptom control.  "

## 2018-01-27 NOTE — MR AVS SNAPSHOT
After Visit Summary   1/27/2018    Dimple Oviedo    MRN: [id number]           Patient Information     Date Of Birth          6/23/1933        Visit Information        Provider Department      1/27/2018 11:00 AM Provider, [facility] Endocrinology        Today's Diagnoses     Hyperthyroidism    -  1       Follow-ups after your visit        Your next 10 appointments already scheduled     Jan 27, 2018 12:00 PM CST   LAB with [facility] ([facility])    [address] [place] [id number] [phone]           Please do not eat 10-12 hours before your appointment if you are coming in fasting for labs on lipids, cholesterol, or glucose (sugar). This does not apply to pregnant women. Water, hot tea and black coffee (with nothing added) are okay. Do not drink other fluids, diet soda or chew gum.            Feb 01, 2018  8:45 AM CST   (Arrive by 8:30 AM)   Cystoscopy with eKnna La MD   [facility] Urology and [facility] for Prostate and Urologic Cancers ([facility])    [address] [place] [id number] [phone]            Feb 07, 2018  1:20 PM CST   Office Visit with Pop Zapien MD   [facility] ([facility])    [address] [place] [id number] [phone]           Bring a current list of meds and any records pertaining to this visit. For Physicals, please bring immunization records and any forms needing to be filled out. Please arrive 10 minutes early to complete paperwork.            Feb 26, 2018 11:30 AM CST   Lab with  LAB   [facility] ([facility])    [address] [place] [id number] [phone]            Feb 26, 2018 12:00 PM CST   (Arrive by 11:45 AM)   CORE RETURN with NELSON Smart [facility] ([facility]  "[place])    [address] [facility]  [address] [place] [id number] [phone]              Future tests that were ordered for you today     Open Future Orders        Priority Expected Expires Ordered    Thyroid stimulating immunoglobulin Routine 1/27/2018 1/30/2018 1/27/2018    TSH Routine 1/27/2018 1/30/2018 1/27/2018    T4 free Routine 1/27/2018 1/30/2018 1/27/2018    T3 total Routine 1/27/2018 1/30/2018 1/27/2018            Who to contact     Please call your clinic at [phone] to:    Ask questions about your health    Make or cancel appointments    Discuss your medicines    Learn about your test results    Speak to your doctor   If you have compliments or concerns about an experience at your clinic, or if you wish to file a complaint, please contact [facility] Physicians Patient Relations at [phone] or email us at [email or web address]         Additional Information About Your Visit        Resort Gemshar[employer] Network Information     Pick1 gives you secure access to your electronic health record. If you see a primary care provider, you can also send messages to your care team and make appointments. If you have questions, please call your primary care clinic.  If you do not have a primary care provider, please call [phone] and they will assist you.      Pick1 is an electronic gateway that provides easy, online access to your medical records. With Pick1, you can request a clinic appointment, read your test results, renew a prescription or communicate with your care team.     To access your existing account, please contact your [facility] Phys[facility] Clinic or call [phone] for assistance.        Care EveryWhere ID     This is your Care EveryWhere ID. This could be used by other organizations to access your [place] medical records  [id number]        Your Vitals Were     Pulse Height BMI (Body Mass Index)             63 1.461 m (4' 9.5\") 32.9 kg/m2          Blood " "Pressure from Last 3 Encounters:   01/27/18 129/63   01/24/18 135/67   01/24/18 117/50    Weight from Last 3 Encounters:   01/27/18 70.2 kg (154 lb 11.2 oz)   01/24/18 71.2 kg (157 lb)   01/24/18 71.1 kg (156 lb 12 oz)               Primary Care Provider Office Phone # Fax #    Pop Antonino Zapien [phone] [address] [place] [address]        Equal Access to Services     [facility]: Hadii shameka chacon hadasho Soomaali, waaxda luqadaha, qaybta kaalmada piliyacristian, maldonado seay . So [facility] [phone].    ATENCIÓN: Si habla español, tiene a sierra disposición servicios gratuitos de asistencia lingüística. [facility] [phone].    We comply with applicable federal civil rights laws and [place] laws. We do not discriminate on the basis of race, color, national origin, age, disability, sex, sexual orientation, or gender identity.            Thank you!     Thank you for choosing [facility] [place]  for your care. Our goal is always to provide you with excellent care. Hearing back from our patients is one way we can continue to improve our services. Please take a few minutes to complete the written survey that you may receive in the mail after your visit with us. Thank you!             Your Updated Medication List - Protect others around you: Learn how to safely use, store and throw away your medicines at www.disposemymeds.org.          This list is accurate as of 1/27/18 11:51 AM.  Always use your most recent med list.                   Brand Name Dispense Instructions for use Diagnosis    acetaminophen 650 MG 8 hour tablet     100 tablet    Take 650 mg by mouth every 8 hours as needed for mild pain or fever    Ascending cholangitis       alendronate 70 MG tablet    FOSAMAX    12 tablet    TAKE 1 TABLET EVERY 7 DAYS AT LEAST 60 MIN BEFORE BREAKFAST AS DIRECTED \"SEE PACKAGE FOR ADDITIONAL INSTRUCTIONS\"    High risk medication use, Osteopenia       ASPIRIN PO "      Take 81 mg by mouth At Bedtime        Calcium carb-Vitamin D 500 mg [place]-200 units 500-200 MG-UNIT per tablet    OSCAL with D;Oyster Shell Calcium     Take 1 tablet by mouth 2 times daily (with meals)        CRANBERRY CONCENTRATE PO      Take 1 capsule by mouth 2 times daily        cycloSPORINE 0.05 % ophthalmic emulsion    RESTASIS     Place 1 drop into both eyes 2 times daily        ferrous sulfate 325 (65 FE) MG tablet    IRON     Take 1 tablet (325 mg) by mouth daily (with breakfast)    Iron deficiency anemia, unspecified iron deficiency anemia type       Fish Oil 500 MG Caps      Take 1 capsule by mouth 2 times daily        furosemide 20 MG tablet    LASIX    30 tablet    Take 1 tablet (20 mg) by mouth daily    Elevated troponin       irbesartan 300 MG tablet    AVAPRO    90 tablet    TAKE 1 TABLET EVERY DAY    Essential hypertension with goal blood pressure less than 140/90       lovastatin 40 MG tablet    MEVACOR    90 tablet    TAKE 1 TABLET AT BEDTIME (HYPERLIPIDEMIA LDL GOAL  BELOW 130)    Hyperlipidemia LDL goal <130       melatonin 3 MG tablet     90 tablet    Take 1 tablet (3 mg) by mouth nightly as needed for sleep    Insomnia       methimazole 5 MG tablet    TAPAZOLE    270 tablet    Take 1 tablet (5 mg) by mouth 3 times daily    Nontoxic multinodular goiter       nitroGLYcerin 0.4 MG sublingual tablet    NITROSTAT    25 tablet    For chest pain place 1 tablet under the tongue every 5 minutes for 3 doses. If symptoms persist 5 minutes after 1st dose call 911.    Elevated troponin       omeprazole 20 MG CR capsule    priLOSEC    180 capsule    Take 1 capsule (20 mg) by mouth daily    Gastroesophageal reflux disease without esophagitis       polyethylene glycol Packet    MIRALAX/GLYCOLAX    7 packet    Take 17 g by mouth 2 times daily as needed for constipation    Other constipation       prednisoLONE acetate 1 % ophthalmic susp    PRED FORTE     Place 1 drop into the right eye 4 times daily Taper  as directed        propranolol 60 MG 24 hr capsule    INDERAL LA    90 capsule    Take 1 capsule (60 mg) by mouth daily    Nontoxic multinodular goiter       sertraline 25 MG tablet    ZOLOFT    30 tablet    Take 1 tablet (25 mg) by mouth daily    Anxiety       SYSTANE 0.4-0.3 % Soln ophthalmic solution   Generic drug:  polyethylene glycol 0.4%- propylene glycol 0.3%      Place 1 drop into both eyes 3 times daily as needed for dry eyes

## 2018-01-29 DIAGNOSIS — E05.90 HYPERTHYROIDISM: Primary | ICD-10-CM

## 2018-02-01 ENCOUNTER — OFFICE VISIT (OUTPATIENT)
Dept: UROLOGY | Facility: CLINIC | Age: 83
End: 2018-02-01
Payer: MEDICARE

## 2018-02-01 ENCOUNTER — ALLIED HEALTH/NURSE VISIT (OUTPATIENT)
Dept: UROLOGY | Facility: CLINIC | Age: 83
End: 2018-02-01
Payer: MEDICARE

## 2018-02-01 VITALS
BODY MASS INDEX: 33.22 KG/M2 | DIASTOLIC BLOOD PRESSURE: 61 MMHG | HEART RATE: 44 BPM | WEIGHT: 154 LBS | HEIGHT: 57 IN | SYSTOLIC BLOOD PRESSURE: 152 MMHG

## 2018-02-01 DIAGNOSIS — E05.90 HYPERTHYROIDISM: ICD-10-CM

## 2018-02-01 DIAGNOSIS — R31.0 GROSS HEMATURIA: Primary | ICD-10-CM

## 2018-02-01 DIAGNOSIS — N32.9 LESION OF BLADDER: Primary | ICD-10-CM

## 2018-02-01 DIAGNOSIS — N32.9 LESION OF BLADDER: ICD-10-CM

## 2018-02-01 DIAGNOSIS — R39.89 URINARY PROBLEM: ICD-10-CM

## 2018-02-01 DIAGNOSIS — R31.9 HEMATURIA, UNSPECIFIED TYPE: ICD-10-CM

## 2018-02-01 LAB
ALBUMIN UR-MCNC: NEGATIVE MG/DL
APPEARANCE UR: CLEAR
BACTERIA #/AREA URNS HPF: ABNORMAL /HPF
BILIRUB UR QL STRIP: NEGATIVE
COLOR UR AUTO: ABNORMAL
GLUCOSE UR STRIP-MCNC: 50 MG/DL
HGB UR QL STRIP: ABNORMAL
HYALINE CASTS #/AREA URNS LPF: 2 /LPF (ref 0–2)
KETONES UR STRIP-MCNC: NEGATIVE MG/DL
LEUKOCYTE ESTERASE UR QL STRIP: ABNORMAL
MUCOUS THREADS #/AREA URNS LPF: PRESENT /LPF
NITRATE UR QL: NEGATIVE
PH UR STRIP: 5 PH (ref 5–7)
RBC #/AREA URNS AUTO: >182 /HPF (ref 0–2)
SOURCE: ABNORMAL
SP GR UR STRIP: 1 (ref 1–1.03)
SQUAMOUS #/AREA URNS AUTO: <1 /HPF (ref 0–1)
UROBILINOGEN UR STRIP-MCNC: 0 MG/DL (ref 0–2)
WBC #/AREA URNS AUTO: 16 /HPF (ref 0–2)

## 2018-02-01 PROCEDURE — 87086 URINE CULTURE/COLONY COUNT: CPT | Performed by: FAMILY MEDICINE

## 2018-02-01 RX ORDER — CEFAZOLIN SODIUM 1 G/3ML
1 INJECTION, POWDER, FOR SOLUTION INTRAMUSCULAR; INTRAVENOUS SEE ADMIN INSTRUCTIONS
Status: CANCELLED | OUTPATIENT
Start: 2018-02-01

## 2018-02-01 ASSESSMENT — PAIN SCALES - GENERAL
PAINLEVEL: NO PAIN (0)
PAINLEVEL: NO PAIN (0)

## 2018-02-01 NOTE — MR AVS SNAPSHOT
After Visit Summary   2/1/2018    Dimple Oviedo    MRN: [id number]           Patient Information     Date Of Birth          6/23/1933        Visit Information        Provider Department      2/1/2018 12:00 PM Nurse, [facility] for Prostate and Urologic Cancers        Today's Diagnoses     Lesion of bladder    -  1       Follow-ups after your visit        Your next 10 appointments already scheduled     Feb 07, 2018  1:20 PM CST   Pre-Op physical with Pop Zapien MD   [facility] ([facility])    [address] [place] [phone]            Feb 19, 2018   Procedure with Kenna La MD   [facility] [place], Same Day Surgery (--)    [address] [facility] [phone]            Feb 26, 2018 11:30 AM CST   Lab with  LAB   [facility] ([facility])    [address] [place] [address]  [address]st [facility] [id number] [phone]            Feb 26, 2018 12:00 PM CST   (Arrive by 11:45 AM)   CORE RETURN with NELSON Smart CNP   [facility] ([facility])    [address] [place] [phone] [phone]            Mar 08, 2018  9:15 AM CST   (Arrive by 9:00 AM)   Post-Op with Kenna La MD   [facility] for Prostate and Urologic Cancers ([facility])    [address] [place] [address]  4th Floor  [facility] [phone] [phone]              Future tests that were ordered for you today     Open Future Orders        Priority Expected Expires Ordered    TSH Routine 2/5/2018 2/1/2019 2/1/2018    T4 free Routine 2/5/2018 2/1/2019 2/1/2018    T3 total Routine 2/5/2018 2/1/2019 2/1/2018            Who to contact     Please call your clinic at [phone] to:    Ask questions about your health    Make or cancel appointments    Discuss your  medicines    Learn about your test results    Speak to your doctor   If you have compliments or concerns about an experience at your clinic, or if you wish to file a complaint, please contact [facility] Patient Relations at [phone] or email us at [email or web address]         Additional Information About Your Visit        MyChart Information     [employer] Tri[employer]art gives you secure access to your electronic health record. If you see a primary care provider, you can also send messages to your care team and make appointments. If you have questions, please call your primary care clinic.  If you do not have a primary care provider, please call [phone] and they will assist you.      Kwaga is an electronic gateway that provides easy, online access to your medical records. With Kwaga, you can request a clinic appointment, read your test results, renew a prescription or communicate with your care team.     To access your existing account, please contact your [facility] Clinic or call [phone] for assistance.        Care EveryWhere ID     This is your Care EveryWhere ID. This could be used by other organizations to access your [place] medical records  [id number]         Blood Pressure from Last 3 Encounters:   02/01/18 152/61   01/27/18 129/63   01/24/18 135/67    Weight from Last 3 Encounters:   02/01/18 69.9 kg (154 lb)   01/27/18 70.2 kg (154 lb 11.2 oz)   01/24/18 71.2 kg (157 lb)              Today, you had the following     No orders found for display       Primary Care Provider Office Phone # Fax #    Pop Antonino Zapien [phone] [address] [place] [address]        Equal Access to Services     GUNNER RODRIGUEZ : nadia Schneider qaybta kaalmada adeegyada, waxay idiin hayaan adeeg kharash la'aan ah. So [facility] [phone].    ATENCIÓN: Si habla español, tiene a sierra disposición servicios  "camila de asistencia lingüística. Al leon [phone].    We comply with applicable federal civil rights laws and [place] laws. We do not discriminate on the basis of race, color, national origin, age, disability, sex, sexual orientation, or gender identity.            Thank you!     Thank you for choosing [facility] UROLOGY AND [facility] FOR PROSTATE AND UROLOGIC CANCERS  for your care. Our goal is always to provide you with excellent care. Hearing back from our patients is one way we can continue to improve our services. Please take a few minutes to complete the written survey that you may receive in the mail after your visit with us. Thank you!             Your Updated Medication List - Protect others around you: Learn how to safely use, store and throw away your medicines at www.disposemymeds.org.          This list is accurate as of 2/1/18 11:59 PM.  Always use your most recent med list.                   Brand Name Dispense Instructions for use Diagnosis    acetaminophen 650 MG 8 hour tablet     100 tablet    Take 650 mg by mouth every 8 hours as needed for mild pain or fever    Ascending cholangitis       alendronate 70 MG tablet    FOSAMAX    12 tablet    TAKE 1 TABLET EVERY 7 DAYS AT LEAST 60 MIN BEFORE BREAKFAST AS DIRECTED \"SEE PACKAGE FOR ADDITIONAL INSTRUCTIONS\"    High risk medication use, Osteopenia       ASPIRIN PO      Take 81 mg by mouth At Bedtime        Calcium carb-Vitamin D 500 mg [place]-200 units 500-200 MG-UNIT per tablet    OSCAL with D;Oyster Shell Calcium     Take 1 tablet by mouth 2 times daily (with meals)        CRANBERRY CONCENTRATE PO      Take 1 capsule by mouth 2 times daily        cycloSPORINE 0.05 % ophthalmic emulsion    RESTASIS     Place 1 drop into both eyes 2 times daily        ferrous sulfate 325 (65 FE) MG tablet    IRON     Take 1 tablet (325 mg) by mouth daily (with breakfast)    Iron deficiency anemia, unspecified iron deficiency anemia type       Fish Oil 500 MG Caps      " Take 1 capsule by mouth 2 times daily        furosemide 20 MG tablet    LASIX    30 tablet    Take 1 tablet (20 mg) by mouth daily    Elevated troponin       irbesartan 300 MG tablet    AVAPRO    90 tablet    TAKE 1 TABLET EVERY DAY    Essential hypertension with goal blood pressure less than 140/90       lovastatin 40 MG tablet    MEVACOR    90 tablet    TAKE 1 TABLET AT BEDTIME (HYPERLIPIDEMIA LDL GOAL  BELOW 130)    Hyperlipidemia LDL goal <130       melatonin 3 MG tablet     90 tablet    Take 1 tablet (3 mg) by mouth nightly as needed for sleep    Insomnia       methimazole 5 MG tablet    TAPAZOLE    270 tablet    Take 1 tablet (5 mg) by mouth 3 times daily    Nontoxic multinodular goiter       nitroGLYcerin 0.4 MG sublingual tablet    NITROSTAT    25 tablet    For chest pain place 1 tablet under the tongue every 5 minutes for 3 doses. If symptoms persist 5 minutes after 1st dose call 911.    Elevated troponin       omeprazole 20 MG CR capsule    priLOSEC    180 capsule    Take 1 capsule (20 mg) by mouth daily    Gastroesophageal reflux disease without esophagitis       polyethylene glycol Packet    MIRALAX/GLYCOLAX    7 packet    Take 17 g by mouth 2 times daily as needed for constipation    Other constipation       prednisoLONE acetate 1 % ophthalmic susp    PRED FORTE     Place 1 drop into the right eye 4 times daily Taper as directed        propranolol 60 MG 24 hr capsule    INDERAL LA    90 capsule    Take 1 capsule (60 mg) by mouth daily    Nontoxic multinodular goiter       sertraline 25 MG tablet    ZOLOFT    30 tablet    Take 1 tablet (25 mg) by mouth daily    Anxiety       SYSTANE 0.4-0.3 % Soln ophthalmic solution   Generic drug:  polyethylene glycol 0.4%- propylene glycol 0.3%      Place 1 drop into both eyes 3 times daily as needed for dry eyes

## 2018-02-01 NOTE — PROGRESS NOTES
February 1, 2018    Return visit    Patient returns today for follow up.  She is here today with her daughter.  She was seen in the hospital for gross hematuria and is here today for cystoscopy to complete the hematuria evaluation.  She does have a history of passing a kidney stone in the past.  She more recently found to have afib and had a SILVESTRE around the time of this episode of gross hematuria.  Of note she also reports a lot of anxiety over the blood in the urine    Past Medical History:   Diagnosis Date     Calculus of kidney      Esophageal reflux      GERD (gastroesophageal reflux disease)      Hyperlipidemia LDL goal <130 5/9/2010     Malignant melanoma of skin of trunk, except scrotum (H)      Nonspecific abnormal finding     has living will 2004 -      Nontoxic multinodular goiter     no further eval /tx rec per pt     Osteopenia      Other psoriasis      Personal history of colonic polyps      PMR (polymyalgia rheumatica) (H)      Stress-induced cardiomyopathy      Undiagnosed cardiac murmurs      Unspecified constipation      Unspecified essential hypertension      Past Surgical History:   Procedure Laterality Date     C NONSPECIFIC PROCEDURE  2005    colonoscopy polyp repeat 2010     ENDOSCOPIC ULTRASOUND LOWER GASTROINTESTIONAL TRACT (GI) N/A 10/30/2015    Procedure: ENDOSCOPIC ULTRASOUND LOWER GASTROINTESTIONAL TRACT (GI);  Surgeon: Daniel Jean-Baptiste MD;  Location: [facility] OR     LAPAROSCOPIC CHOLECYSTECTOMY WITH CHOLANGIOGRAMS N/A 11/1/2015    Procedure: LAPAROSCOPIC CHOLECYSTECTOMY WITH CHOLANGIOGRAMS;  Surgeon: Tonie Warren MD;  Location: [facility] OR     SURGICAL HISTORY OF -   1996    malignant melanoma     SURGICAL HISTORY OF -   1968    thyroid nodule     SURGICAL HISTORY OF -       D & C       Current Outpatient Prescriptions:      ferrous sulfate (IRON) 325 (65 FE) MG tablet, Take 1 tablet (325 mg) by mouth daily (with breakfast), Disp: , Rfl:      irbesartan (AVAPRO) 300 MG tablet, TAKE 1  "TABLET EVERY DAY, Disp: 90 tablet, Rfl: 0     propranolol (INDERAL LA) 60 MG 24 hr capsule, Take 1 capsule (60 mg) by mouth daily, Disp: 90 capsule, Rfl: 0     methimazole (TAPAZOLE) 5 MG tablet, Take 1 tablet (5 mg) by mouth 3 times daily, Disp: 270 tablet, Rfl: 0     ASPIRIN PO, Take 81 mg by mouth At Bedtime, Disp: , Rfl:      Calcium carb-Vitamin D 500 mg [place]-200 units (OSCAL WITH D;OYSTER SHELL CALCIUM) 500-200 MG-UNIT per tablet, Take 1 tablet by mouth 2 times daily (with meals), Disp: , Rfl:      cycloSPORINE (RESTASIS) 0.05 % ophthalmic emulsion, Place 1 drop into both eyes 2 times daily, Disp: , Rfl:      polyethylene glycol 0.4%- propylene glycol 0.3% (SYSTANE) 0.4-0.3 % SOLN ophthalmic solution, Place 1 drop into both eyes 3 times daily as needed for dry eyes, Disp: , Rfl:      furosemide (LASIX) 20 MG tablet, Take 1 tablet (20 mg) by mouth daily, Disp: 30 tablet, Rfl: 3     nitroGLYcerin (NITROSTAT) 0.4 MG sublingual tablet, For chest pain place 1 tablet under the tongue every 5 minutes for 3 doses. If symptoms persist 5 minutes after 1st dose call 911., Disp: 25 tablet, Rfl: 3     CRANBERRY CONCENTRATE PO, Take 1 capsule by mouth 2 times daily, Disp: , Rfl:      lovastatin (MEVACOR) 40 MG tablet, TAKE 1 TABLET AT BEDTIME (HYPERLIPIDEMIA LDL GOAL  BELOW 130), Disp: 90 tablet, Rfl: 2     omeprazole (PRILOSEC) 20 MG CR capsule, Take 1 capsule (20 mg) by mouth daily, Disp: 180 capsule, Rfl: 3     alendronate (FOSAMAX) 70 MG tablet, TAKE 1 TABLET EVERY 7 DAYS AT LEAST 60 MIN BEFORE BREAKFAST AS DIRECTED \"SEE PACKAGE FOR ADDITIONAL INSTRUCTIONS\", Disp: 12 tablet, Rfl: 2     acetaminophen 650 MG TABS, Take 650 mg by mouth every 8 hours as needed for mild pain or fever, Disp: 100 tablet, Rfl: 0     polyethylene glycol (MIRALAX/GLYCOLAX) packet, Take 17 g by mouth 2 times daily as needed for constipation, Disp: 7 packet, Rfl: 0     Omega-3 Fatty Acids (FISH OIL) 500 MG CAPS, Take 1 capsule by mouth 2 times " "daily , Disp: , Rfl:      melatonin 3 MG tablet, Take 1 tablet (3 mg) by mouth nightly as needed for sleep, Disp: 90 tablet, Rfl: 0     diazepam (VALIUM) 2 MG tablet, Take 1 tablet (2 mg) by mouth every 12 hours as needed for anxiety or sleep, Disp: 20 tablet, Rfl: 0     sertraline (ZOLOFT) 50 MG tablet, Take 1 tablet (50 mg) by mouth daily, Disp: 90 tablet, Rfl: 0      /61  Pulse (!) 44  Ht 1.448 m (4' 9\")  Wt 69.9 kg (154 lb)  BMI 33.33 kg/m2  She is comfortable, in no distress, non-labored breathing.  Abdomen is soft, non-tender, non-distended.  Normal external female genitalia.  Negative CST.  Pelvic exam is unremarkable    Cystoscopy Note: After informed consent was obtained patient was prepped and draped in the standard fashion.  The flexible cystoscope was inserted into a normal appearing urethral meatus.  The urothelium was carefully examined and there was a small papillary tumor.  There were no masses, stones, foreign bodies, or other urothelial abnormalities noted.  Bilateral ureteral orifices were noted in the normal orthotopic position and both effluxed clear urine.  The cystoscope was retroflexed and the bladder neck was unremarkable.  The urethra was carefully examined upon removing the cystoscope and was unremarkable.  Patient tolerated the procedure without complications noted.      Cytology negative but positive FISH    CT scan 1/2018  IMPRESSION:   1. 4 mm nonobstructive stone in the lower pole of the left kidney. No  right renal stones. No hydronephrosis or hydroureter bilaterally.  Decompressed urinary bladder.  2. 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  3. Partially visualized moderate sliding hiatal hernia.    A/P: 84 year old F with bladder tumor found at the time of cystoscopy for gross hematuria evaluation    Discussed recommendations for transurethral resection of the bladder tumor.  Discussed risks to include but not limited to the risks of " the anesthesia, bleeding, infection, injury to adjacent organs, need for postoperative goldman catheter, need for further treatments including rebiopsy.  She has expressed understanding and is anxious to proceed as soon as possible.      She needs medical clearance and to stop any anticoagulation prior to surgery    Kenna La MD MPH   of Urology    CC  Patient Care Team:  Pop Moreno MD as PCP - General (Family Practice)  Vincenzo Tovar MD as MD (Family Medicine - Sports Medicine)  Candelaria Raymundo MD as MD (Gastroenterology)  Shavon Bustamante PA-C as Physician Assistant (Physician Assistant)  Liana Aponte RN as Registered Nurse  POP MORENO

## 2018-02-01 NOTE — NURSING NOTE
Invasive Procedure Safety Checklist:    Procedure: cysto     Lidocaine  LOT- [id number]  EXP- 9-19    Sulfamethoxazole LOT- [id number] EXP- 10/2018    Action: Complete sections and checkboxes as appropriate.    Pre-procedure:  1. Patient ID Verified with 2 identifiers (Bettie and  or MRN) : YES    2. Procedure and site verified with patient/designee (when able) : YES    3. Accurate consent documentation in medical record : YES    4. H&P (or appropriate assessment) documented in medical record : NO  H&P must be up to 30 days prior to procedure an updated within 24 hours of                 Procedure as applicable.     5. Relevant diagnostic and radiology test results appropriately labeled and displayed as applicable : YES    6. Blood products, implants, devices, and/or special equipment available for the procedure as applicable : YES    7. Procedure site(s) marked with provider initials [Exclusions: none] : NO    8. Marking not required. Reason : Yes  Procedure does not require site marking    Time Out:     Time-Out performed immediately prior to starting procedure, including verbal and active participation of all team members addressing: YES    1. Correct patient identity.  2. Confirmed that the correct side and site are marked.  3. An accurate procedure to be done.  4. Agreement on the procedure to be done.  5. Correct patient position.  6. Relevant images and results are properly labeled and appropriately displayed.  7. The need to administer antibiotics or fluids for irrigation purposes during the procedure as applicable.  8. Safety precautions based on patient history or medication use.    During Procedure: Verification of correct person, site, and procedure occurs any time the responsibility for care of the patient is transferred to another member of the care team.

## 2018-02-01 NOTE — MR AVS SNAPSHOT
"              After Visit Summary   2/1/2018    Dimple Oviedo    MRN: [id number]           Patient Information     Date Of Birth          6/23/1933        Visit Information        Provider Department      2/1/2018 8:45 AM Kenna La MD [facility]rology and [facility]        Today's Diagnoses     Gross hematuria    -  1    Lesion of bladder          Care Instructions      AFTER YOUR CYSTOSCOPY        You have just completed a cystoscopy, or \"cysto\", which allowed your physician to learn more about your bladder (or to remove a stent placed after surgery). We suggest that you continue to avoid caffeine, fruit juice, and alcohol for the next 24 hours, however, you are encouraged to return to your normal activities.         A few things that are considered normal after your cystoscopy:     * Small amount of bleeding (or spotting) that clears within the next 24 hours     * Slight burning sensation with urination     * Sensation to of needing to avoid more frequently     * The feeling of \"air\" in your urine     * Mild discomfort that is relieved with Tylenol        Please contact our office promptly if you:     * Develop a fever above 101 degrees     * Are unable to urinate     * Develop bright red blood that does not stop     * Severe pain or swelling         Please contact our office with any concerns or questions @ [phone]          Follow-ups after your visit        Your next 10 appointments already scheduled     Feb 14, 2018  2:00 PM CST   LAB with  [facility]   [facility]    [address] [place] [address] [phone]           Please do not eat 10-12 hours before your appointment if you are coming in fasting for labs on lipids, cholesterol, or glucose (sugar). This does not apply to pregnant women. Water, hot tea and black coffee (with nothing added) are okay. Do not drink other fluids, diet soda or chew gum.    "         Feb 19, 2018   Procedure with Kenna La MD   [facility] [place], Same Day Surgery (--)    [address]  [place] [address]   [phone]            Feb 26, 2018 11:30 AM CST   Lab with SANJAY LAB   [facility] ([facility])    [address]  1st Floor  [facility] [id number] [phone]            Feb 26, 2018 12:00 PM CST   (Arrive by 11:45 AM)   CORE RETURN with NELSON Smart CNP   [facility] ([facility])    [address]  [address]  [facility] [address] [phone]            Mar 08, 2018  9:15 AM CST   (Arrive by 9:00 AM)   Post-Op with Kenna La MD   [facility] for Prostate and Urologic Cancers ([facility])    [address]  4th Floor  [facility] [id number] [phone]            May 29, 2018 10:00 AM CDT   (Arrive by 9:45 AM)   RETURN ENDOCRINE with Dhara Meza MD   [facility] End[facility]in[facility] ([facility])    [address]  3rd Floor  [facility] [id number] [phone]              Who to contact     Please call your clinic at [phone] to:    Ask questions about your health    Make or cancel appointments    Discuss your medicines    Learn about your test results    Speak to your doctor            Additional Information About Your Visit        [employer] Information     [employer] gives you secure access to your electronic health record. If you see a primary care provider, you can also send messages to your care team and make appointments. If you have questions, please call your primary care clinic.  If you do not have a primary care provider, please call [phone] and they will assist you.      [employer] is an electronic gateway that provides easy, online access to your medical records. With [employer], you can request a clinic appointment, read your test results, renew a prescription or  "communicate with your care team.     To access your existing account, please contact your [facility] or call [phone] for assistance.        Care EveryWhere ID     This is your Care EveryWhere ID. This could be used by other organizations to access your [place] medical records  [id number]        Your Vitals Were     Pulse Height BMI (Body Mass Index)             44 1.448 m (4' 9\") 33.33 kg/m2          Blood Pressure from Last 3 Encounters:   02/07/18 (!) 102/36   02/06/18 130/57   02/01/18 152/61    Weight from Last 3 Encounters:   02/07/18 69.4 kg (153 lb)   02/06/18 70.3 kg (155 lb)   02/01/18 69.9 kg (154 lb)              We Performed the Following     Cath Insertion, Simple ([address])     CYSTOURETHROSCOPY     Ana-Operative Worksheet  (Urology General)        Primary Care Provider Office Phone # Fax #    Pop Antonino Zapien [phone] [address] [place] [address]        Equal Access to Services     [facility]: Hadii shameka chacon hadasho Sogabo, waaxda luqadaha, qaybta kaalmada saira, maldonado seay . [facility] [phone].    ATENCIÓN: Si habla español, tiene a sierra disposición servicios gratuitos de asistencia lingüística. [facility] [phone].    We comply with applicable federal civil rights laws and [place] laws. We do not discriminate on the basis of race, color, national origin, age, disability, sex, sexual orientation, or gender identity.            Thank you!     Thank you for choosing [facility] UROLOGY AND [facility] FOR PROSTATE AND UROLOGIC CANCERS  for your care. Our goal is always to provide you with excellent care. Hearing back from our patients is one way we can continue to improve our services. Please take a few minutes to complete the written survey that you may receive in the mail after your visit with us. Thank you!             Your Updated Medication List - Protect others around you: Learn how to " "safely use, store and throw away your medicines at www.disposemymeds.org.          This list is accurate as of 2/1/18 11:59 PM.  Always use your most recent med list.                   Brand Name Dispense Instructions for use Diagnosis    acetaminophen 650 MG 8 hour tablet     100 tablet    Take 650 mg by mouth every 8 hours as needed for mild pain or fever    Ascending cholangitis       alendronate 70 MG tablet    FOSAMAX    12 tablet    TAKE 1 TABLET EVERY 7 DAYS AT LEAST 60 MIN BEFORE BREAKFAST AS DIRECTED \"SEE PACKAGE FOR ADDITIONAL INSTRUCTIONS\"    High risk medication use, Osteopenia       ASPIRIN PO      Take 81 mg by mouth At Bedtime        Calcium carb-Vitamin D 500 mg [place]-200 units 500-200 MG-UNIT per tablet    OSCAL with D;Oyster Shell Calcium     Take 1 tablet by mouth 2 times daily (with meals)        CRANBERRY CONCENTRATE PO      Take 1 capsule by mouth 2 times daily        cycloSPORINE 0.05 % ophthalmic emulsion    RESTASIS     Place 1 drop into both eyes 2 times daily        ferrous sulfate 325 (65 FE) MG tablet    IRON     Take 1 tablet (325 mg) by mouth daily (with breakfast)    Iron deficiency anemia, unspecified iron deficiency anemia type       Fish Oil 500 MG Caps      Take 1 capsule by mouth 2 times daily        furosemide 20 MG tablet    LASIX    30 tablet    Take 1 tablet (20 mg) by mouth daily    Elevated troponin       irbesartan 300 MG tablet    AVAPRO    90 tablet    TAKE 1 TABLET EVERY DAY    Essential hypertension with goal blood pressure less than 140/90       lovastatin 40 MG tablet    MEVACOR    90 tablet    TAKE 1 TABLET AT BEDTIME (HYPERLIPIDEMIA LDL GOAL  BELOW 130)    Hyperlipidemia LDL goal <130       melatonin 3 MG tablet     90 tablet    Take 1 tablet (3 mg) by mouth nightly as needed for sleep    Insomnia       methimazole 5 MG tablet    TAPAZOLE    270 tablet    Take 1 tablet (5 mg) by mouth 3 times daily    Nontoxic multinodular goiter       nitroGLYcerin 0.4 MG " sublingual tablet    NITROSTAT    25 tablet    For chest pain place 1 tablet under the tongue every 5 minutes for 3 doses. If symptoms persist 5 minutes after 1st dose call 911.    Elevated troponin       omeprazole 20 MG CR capsule    priLOSEC    180 capsule    Take 1 capsule (20 mg) by mouth daily    Gastroesophageal reflux disease without esophagitis       polyethylene glycol Packet    MIRALAX/GLYCOLAX    7 packet    Take 17 g by mouth 2 times daily as needed for constipation    Other constipation       prednisoLONE acetate 1 % ophthalmic susp    PRED FORTE     Place 1 drop into the right eye 4 times daily Taper as directed        propranolol 60 MG 24 hr capsule    INDERAL LA    90 capsule    Take 1 capsule (60 mg) by mouth daily    Nontoxic multinodular goiter       SYSTANE 0.4-0.3 % Soln ophthalmic solution   Generic drug:  polyethylene glycol 0.4%- propylene glycol 0.3%      Place 1 drop into both eyes 3 times daily as needed for dry eyes

## 2018-02-01 NOTE — LETTER
2/1/2018       RE: Dimple Oviedo  [address]   [facility] [address]     Dear Colleague,    Thank you for referring your patient, Dimple Oviedo, to the [facility] UROLOGY AND INST FOR PROSTATE AND UROLOGIC CANCERS at [facility]. Please see a copy of my visit note below.    February 1, 2018    Return visit    Patient returns today for follow up.  She is here today with her daughter.  She was seen in the hospital for gross hematuria and is here today for cystoscopy to complete the hematuria evaluation.  She does have a history of passing a kidney stone in the past.  She more recently found to have afib and had a SILVESTRE around the time of this episode of gross hematuria.  Of note she also reports a lot of anxiety over the blood in the urine    Past Medical History:   Diagnosis Date     Calculus of kidney      Esophageal reflux      GERD (gastroesophageal reflux disease)      Hyperlipidemia LDL goal <130 5/9/2010     Malignant melanoma of skin of trunk, except scrotum (H)      Nonspecific abnormal finding     has living will 2004 -      Nontoxic multinodular goiter     no further eval /tx rec per pt     Osteopenia      Other psoriasis      Personal history of colonic polyps      PMR (polymyalgia rheumatica) (H)      Stress-induced cardiomyopathy      Undiagnosed cardiac murmurs      Unspecified constipation      Unspecified essential hypertension      Past Surgical History:   Procedure Laterality Date     C NONSPECIFIC PROCEDURE  2005    colonoscopy polyp repeat 2010     ENDOSCOPIC ULTRASOUND LOWER GASTROINTESTIONAL TRACT (GI) N/A 10/30/2015    Procedure: ENDOSCOPIC ULTRASOUND LOWER GASTROINTESTIONAL TRACT (GI);  Surgeon: Daniel Jean-Baptiste MD;  Location: [facility] OR     LAPAROSCOPIC CHOLECYSTECTOMY WITH CHOLANGIOGRAMS N/A 11/1/2015    Procedure: LAPAROSCOPIC CHOLECYSTECTOMY WITH CHOLANGIOGRAMS;  Surgeon: Tonie Warren MD;  Location: [facility] OR     SURGICAL HISTORY  "OF -   1996    malignant melanoma     SURGICAL HISTORY OF -   1968    thyroid nodule     SURGICAL HISTORY OF -       D & C       Current Outpatient Prescriptions:      ferrous sulfate (IRON) 325 (65 FE) MG tablet, Take 1 tablet (325 mg) by mouth daily (with breakfast), Disp: , Rfl:      irbesartan (AVAPRO) 300 MG tablet, TAKE 1 TABLET EVERY DAY, Disp: 90 tablet, Rfl: 0     propranolol (INDERAL LA) 60 MG 24 hr capsule, Take 1 capsule (60 mg) by mouth daily, Disp: 90 capsule, Rfl: 0     methimazole (TAPAZOLE) 5 MG tablet, Take 1 tablet (5 mg) by mouth 3 times daily, Disp: 270 tablet, Rfl: 0     ASPIRIN PO, Take 81 mg by mouth At Bedtime, Disp: , Rfl:      Calcium carb-Vitamin D 500 mg "[employer]-200 units (OSCAL WITH D;OYSTER SHELL CALCIUM) 500-200 MG-UNIT per tablet, Take 1 tablet by mouth 2 times daily (with meals), Disp: , Rfl:      cycloSPORINE (RESTASIS) 0.05 % ophthalmic emulsion, Place 1 drop into both eyes 2 times daily, Disp: , Rfl:      polyethylene glycol 0.4%- propylene glycol 0.3% (SYSTANE) 0.4-0.3 % SOLN ophthalmic solution, Place 1 drop into both eyes 3 times daily as needed for dry eyes, Disp: , Rfl:      furosemide (LASIX) 20 MG tablet, Take 1 tablet (20 mg) by mouth daily, Disp: 30 tablet, Rfl: 3     nitroGLYcerin (NITROSTAT) 0.4 MG sublingual tablet, For chest pain place 1 tablet under the tongue every 5 minutes for 3 doses. If symptoms persist 5 minutes after 1st dose call 911., Disp: 25 tablet, Rfl: 3     CRANBERRY CONCENTRATE PO, Take 1 capsule by mouth 2 times daily, Disp: , Rfl:      lovastatin (MEVACOR) 40 MG tablet, TAKE 1 TABLET AT BEDTIME (HYPERLIPIDEMIA LDL GOAL  BELOW 130), Disp: 90 tablet, Rfl: 2     omeprazole (PRILOSEC) 20 MG CR capsule, Take 1 capsule (20 mg) by mouth daily, Disp: 180 capsule, Rfl: 3     alendronate (FOSAMAX) 70 MG tablet, TAKE 1 TABLET EVERY 7 DAYS AT LEAST 60 MIN BEFORE BREAKFAST AS DIRECTED \"SEE PACKAGE FOR ADDITIONAL INSTRUCTIONS\", Disp: 12 tablet, Rfl: 2     " "acetaminophen 650 MG TABS, Take 650 mg by mouth every 8 hours as needed for mild pain or fever, Disp: 100 tablet, Rfl: 0     polyethylene glycol (MIRALAX/GLYCOLAX) packet, Take 17 g by mouth 2 times daily as needed for constipation, Disp: 7 packet, Rfl: 0     Omega-3 Fatty Acids (FISH OIL) 500 MG CAPS, Take 1 capsule by mouth 2 times daily , Disp: , Rfl:      melatonin 3 MG tablet, Take 1 tablet (3 mg) by mouth nightly as needed for sleep, Disp: 90 tablet, Rfl: 0     diazepam (VALIUM) 2 MG tablet, Take 1 tablet (2 mg) by mouth every 12 hours as needed for anxiety or sleep, Disp: 20 tablet, Rfl: 0     sertraline (ZOLOFT) 50 MG tablet, Take 1 tablet (50 mg) by mouth daily, Disp: 90 tablet, Rfl: 0      /61  Pulse (!) 44  Ht 1.448 m (4' 9\")  Wt 69.9 kg (154 lb)  BMI 33.33 kg/m2  She is comfortable, in no distress, non-labored breathing.  Abdomen is soft, non-tender, non-distended.  Normal external female genitalia.  Negative CST.  Pelvic exam is unremarkable    Cystoscopy Note: After informed consent was obtained patient was prepped and draped in the standard fashion.  The flexible cystoscope was inserted into a normal appearing urethral meatus.  The urothelium was carefully examined and there was a small papillary tumor.  There were no masses, stones, foreign bodies, or other urothelial abnormalities noted.  Bilateral ureteral orifices were noted in the normal orthotopic position and both effluxed clear urine.  The cystoscope was retroflexed and the bladder neck was unremarkable.  The urethra was carefully examined upon removing the cystoscope and was unremarkable.  Patient tolerated the procedure without complications noted.      Cytology negative but positive FISH    CT scan 1/2018  IMPRESSION:   1. 4 mm nonobstructive stone in the lower pole of the left kidney. No  right renal stones. No hydronephrosis or hydroureter bilaterally.  Decompressed urinary bladder.  2. 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  3. Partially visualized moderate sliding hiatal hernia.    A/P: 84 year old F with bladder tumor found at the time of cystoscopy for gross hematuria evaluation    Discussed recommendations for transurethral resection of the bladder tumor.  Discussed risks to include but not limited to the risks of the anesthesia, bleeding, infection, injury to adjacent organs, need for postoperative goldman catheter, need for further treatments including rebiopsy.  She has expressed understanding and is anxious to proceed as soon as possible.      She needs medical clearance and to stop any anticoagulation prior to surgery  CC  Patient Care Team:  Pop Moreno MD as PCP - General (Family Practice)  Vincenzo Tovar MD as MD (Family Medicine - Sports Medicine)  Candelaria Raymundo MD as MD (Gastroenterology)  Shavon Bustamante PA-C as Physician Assistant (Physician Assistant)  Liana Aponte RN as Registered Nurse  POP MORENO    Again, thank you for allowing me to participate in the care of your patient.      Sincerely,    Kenna La MD

## 2018-02-01 NOTE — PATIENT INSTRUCTIONS
"  AFTER YOUR CYSTOSCOPY        You have just completed a cystoscopy, or \"cysto\", which allowed your physician to learn more about your bladder (or to remove a stent placed after surgery). We suggest that you continue to avoid caffeine, fruit juice, and alcohol for the next 24 hours, however, you are encouraged to return to your normal activities.         A few things that are considered normal after your cystoscopy:     * Small amount of bleeding (or spotting) that clears within the next 24 hours     * Slight burning sensation with urination     * Sensation to of needing to avoid more frequently     * The feeling of \"air\" in your urine     * Mild discomfort that is relieved with Tylenol        Please contact our office promptly if you:     * Develop a fever above 101 degrees     * Are unable to urinate     * Develop bright red blood that does not stop     * Severe pain or swelling         Please contact our office with any concerns or questions @ [phone]  "

## 2018-02-02 LAB
BACTERIA SPEC CULT: NORMAL
Lab: NORMAL
SPECIMEN SOURCE: NORMAL
TSI SER-ACNC: 1.2 TSI INDEX

## 2018-02-02 NOTE — NURSING NOTE
Pre Op Teaching Flowsheet       Pre and Post op Patient Education  Relevant Diagnosis:  Bladder tumor  Surgical procedure:  Cystoscopy, transurethral resection of bladder tumor  Teaching Topic:  Pre and post op teaching  Person Involved in teaching: Dimple Oviedo    Motivation Level:  Asks Questions: Yes  Eager to Learn:  Yes  Cooperative: Yes  Receptive (willing/able to accept information):  Yes    Patient demonstrates understanding of the following:  Date of surgery:  2/19/18  Location of surgery:  [address] [place] [address]  History and Physical and any other testing necessary prior to surgery: Yes  Required time line for completion of History and Physical and any pre-op testing: Yes    Patient demonstrates understanding of the following:  Pre-op bowel prep:  N/A  Pre-op showering/scrub information with PCMX Soap: Yes  Blood thinner medications discussed and when to stop (if applicable):  Yes      Infection Prevention:   Patient demonstrates understanding of the following:  Surgical procedure site care taught: Yes  Signs and symptoms of infection taught:  Yes      Post-op follow-up:  Discussed how to contact the hospital, nurse, and clinic scheduling staff if necessary.    Instructional materials used/given/mailed:  [place] [facility] Booklet, post op teaching sheet, Map, Soap, and arrival/location information.    Surgical instructions packet given to patient in office:  Yes.  Daughter or son will be the  and staying with her  The daughter is here for the visit and knows

## 2018-02-03 ENCOUNTER — TELEPHONE (OUTPATIENT)
Dept: ENDOCRINOLOGY | Facility: CLINIC | Age: 83
End: 2018-02-03

## 2018-02-04 NOTE — TELEPHONE ENCOUNTER
Patient called.  She stated that she was recently started on methimazole 5 mg twice per day and propranolol 60 XL daily.  She has been taking this for about two weeks.   Feels 'jittery' and shaky. 'I can hardly sleep'. Denies fever, flu like symptoms. Symptoms not there all the time and she is wondering if it is due to the medication.     Hard of hearing. Lives in a co-ap in her own apartment.      Ref. Range 1/27/2018 12:13   T4 Free Latest Ref Range: 0.76 - 1.46 ng/dL 0.87   Thyroid Stim Immunog Latest Ref Range: <=1.3 TSI index 1.2   Triiodothyronine (T3) Latest Ref Range: 60 - 181 ng/dL 104   TSH Latest Ref Range: 0.40 - 4.00 mU/L 0.10 (L)     Recommendations given:   She has taken today's doses of methimazole and propranolol.   Discussed to be evaluated at urgent care/emergency room in the morning.     Not sure cause of symptom. Symptoms from worsening hyperthyroidism?(tremor/jittery and insomnia and anxiety) vs medication side effect. Doesn't have fever or flu like symptoms to suggest agranulocytosis.   Needs to be evaluated to be sure given age and rule out other causes including infection.     Hold off morning dose of methimazole until evaluation in the morning.    She verbalized understanding.   Patient would like her endocrinologist to be notified. Will notify via this note.

## 2018-02-06 ENCOUNTER — OFFICE VISIT (OUTPATIENT)
Dept: FAMILY MEDICINE | Facility: CLINIC | Age: 83
End: 2018-02-06
Payer: MEDICARE

## 2018-02-06 ENCOUNTER — TELEPHONE (OUTPATIENT)
Dept: FAMILY MEDICINE | Facility: CLINIC | Age: 83
End: 2018-02-06

## 2018-02-06 VITALS
DIASTOLIC BLOOD PRESSURE: 57 MMHG | RESPIRATION RATE: 20 BRPM | WEIGHT: 155 LBS | HEART RATE: 51 BPM | SYSTOLIC BLOOD PRESSURE: 130 MMHG | TEMPERATURE: 97.7 F | OXYGEN SATURATION: 99 % | BODY MASS INDEX: 33.54 KG/M2

## 2018-02-06 DIAGNOSIS — F41.1 GAD (GENERALIZED ANXIETY DISORDER): Primary | ICD-10-CM

## 2018-02-06 PROCEDURE — 99214 OFFICE O/P EST MOD 30 MIN: CPT | Performed by: FAMILY MEDICINE

## 2018-02-06 RX ORDER — DIAZEPAM 2 MG
2 TABLET ORAL EVERY 12 HOURS PRN
Qty: 20 TABLET | Refills: 0 | Status: SHIPPED | OUTPATIENT
Start: 2018-02-06 | End: 2018-07-11

## 2018-02-06 NOTE — PROGRESS NOTES
SUBJECTIVE:   Dimple Oviedo is a 84 year old female who presents to clinic today for the following health issues:      Diarrhea      Duration: off and on for weeks    Description:       Consistency of stool: loose       Blood in stool: no        Number of loose stools past 24 hours: 5    Intensity:  mild    Accompanying signs and symptoms:       Fever: no        Nausea/vomitting: no        Abdominal pain: no        Weight loss: no     History (recent antibiotics or travel/ill contacts/med changes/testing done): no    Precipitating or alleviating factors: None    Therapies tried and outcome: no      Anxiety Follow-Up    Status since last visit: No change    Other associated symptoms:None    Complicating factors:   Significant life event: No   Current substance abuse: None  Depression symptoms: Yes-    THERON-7 SCORE 2/22/2017 1/24/2018   Total Score 1 18     THERON-7    Problem list and histories reviewed & adjusted, as indicated.  Additional history: as documented    Labs reviewed in EPIC.     Reviewed and updated as needed this visit by clinical staff  Tobacco  Allergies  Meds  Med Hx  Surg Hx  Fam Hx  Soc Hx      Reviewed and updated as needed this visit by Provider    Having some loose stool, lots of tremors, hard to navigate     Here with her daughter-in-law.  Really nervous and constantly complaining that she cannot live like this.  She feels constantly shaky, palpitation, teary.  She started to 25 mg of Zoloft a week ago   Does not have any side effect but not seeing any benefit.     She saw endocrine at the [facility] and they are working on her thyroid      She is also having biopsy and cystoscopy for blood in her urine and she is coming tomorrow for preop.      Her daughter is wondering if she has a Sjogren syndrome.      Social History     Social History     Marital status:      Spouse name:      Number of children: 2     Years of education: N/A     Occupational History       Retired     Social History Main Topics     Smoking status: Never Smoker     Smokeless tobacco: Never Used     Alcohol use No      Comment: 6 times per year-one drink     Drug use: No     Sexual activity: Yes     Partners: Male     Other Topics Concern      Service No     Blood Transfusions No     Exercise Yes     curves     Seat Belt Yes     Self-Exams Yes     Parent/Sibling W/ Cabg, Mi Or Angioplasty Before 65f 55m? No     Social History Narrative    December 7, 2009    Balanced Diet - Yes    Osteoporosis Prevention Measures - Dairy servings per day: 2 and Medication/Supplements (See current meds)    Regular Exercise -  Yes Describe curves, walking    Dental Exam - YES - Date: 10/2009    Eye Exam - YES - Date: 2008    Self Breast Exam - Yes    Abuse: Current or Past (Physical, Sexual or Emotional)- No    Do you feel safe in your environment - Yes    Guns stored in the home - No    Sunscreen used - Yes    Seatbelts used - Yes    Lipids -  YES - Date: 11/24/2009    Glucose -  YES - Date: 11/24/2009    Colon Cancer Screening - Colonoscopy 11/18/2005(date completed)    Hemoccults - YES - Date: 10/12/2004    Pap Test -  YES - Date: 09/22/2004    Do you have any concerns about STD's -  No    Mammography - YES - Date: 11/24/2009    DEXA - YES - Date: 11/20/2008    Immunizations reviewed and up to date - Yes    PAULETTE Spencer CMA                 Allergies   Allergen Reactions     No Known Drug Allergies      Patient Active Problem List   Diagnosis     Esophageal reflux     restless leg     heat intolerance     Goiter     Disorder of bone and cartilage     Other psoriasis     perirectal cyst     Malignant melanoma of skin of trunk, except scrotum (H)     Undiagnosed cardiac murmurs     Nontoxic multinodular goiter     Hyperlipidemia LDL goal <130     Hypertension goal BP (blood pressure) < 140/90     Urinary incontinence     Osteoarthritis of left knee     Hip arthritis     Polymyalgia rheumatica (H)     High risk  medication use     Shoulder pain     Sepsis (H)     Impaired fasting blood sugar     Chronic bilateral low back pain without sciatica     Obesity, unspecified obesity severity, unspecified obesity type     Iron deficiency anemia, unspecified iron deficiency anemia type     NSTEMI (non-ST elevated myocardial infarction) (H)     Stress-induced cardiomyopathy     A-fib (H)     Atrial fibrillation, unspecified type (H)     Reviewed medications, social history and  past medical and surgical history.    Review of system: for general, respiratory, CVS, GI and psychiatry negative except for noted above.     EXAM:  /57  Pulse 51  Temp 97.7  F (36.5  C) (Oral)  Resp 20  Wt 155 lb (70.3 kg)  SpO2 99%  BMI 33.54 kg/m2  Constitutional: , alert and no distress   Psychiatric:   She is in tears on and off throughout the visit.  Mild coarse tremors.    ASSESSMENT / PLAN:  (F41.1) THERON (generalized anxiety disorder)  (primary encounter diagnosis)  Comment: unfortunately her hyperthyroidism and her anxiety both are making her symptoms significantly worse.  We talked about realistic expectation and we talk antianxiety medications may not make a difference for another 1 month.  Even though benzodiazepine may not be the most ideal for her age,  it may be reasonable to prescribe her due to so much of her physical symptoms.  We talked about being a controlled substance.  We talked about the side effects and how to use.   Plan: diazepam (VALIUM) 2 MG tablet, sertraline         (ZOLOFT) 50 MG tablet          I am going to see her tomorrow for her preop and I will see how she is doing with valium use    She is interested in having Sjogren's syndrome test as per her daughter's request which I recommended against it as he does not have a classic symptom and I worry that she may get more nervous with nonspecific rheumatological testing results.  We will certainly obtain the future if needed.    I spent > 25 minutes and more than 1/2  of the time was in counselling and coordination of care regarding above mentioned issues.

## 2018-02-06 NOTE — MR AVS SNAPSHOT
After Visit Summary   2/6/2018    Dimple Oviedo    MRN: [id number]           Patient Information     Date Of Birth          6/23/1933        Visit Information        Provider Department      2/6/2018 11:40 AM Pop Zapien MD [facility]        Today's Diagnoses     THERON (generalized anxiety disorder)    -  1       Follow-ups after your visit        Your next 10 appointments already scheduled     Feb 07, 2018  1:20 PM CST   Pre-Op physical with Pop Zapien MD   [facility]    [address] [place] [id number] [phone]            Feb 19, 2018   Procedure with Kenna See Julius La MD   [facility] [place], Same Day Surgery (--)    [address] [facility] [id number] [phone]            Feb 26, 2018 11:30 AM CST   Lab with  LAB   [facility] ([facility])    [address] [place] [address] [id number] [phone]            Feb 26, 2018 12:00 PM CST   (Arrive by 11:45 AM)   CORE RETURN with NELSON Smart [facility] ([facility])    [address] [place] [address] [id number] [phone]            Mar 08, 2018  9:15 AM CST   (Arrive by 9:00 AM)   Post-Op with Kenna See Julius La MD   [facility] ([facility])    [address] [place] [address]  [address]th [facility] [id number] [phone]              Who to contact     If you have questions or need follow up information about today's clinic visit or your schedule please contact [facility] directly at [phone].  Normal or non-critical lab and imaging results will be communicated to you by MyChart, letter or phone within 4 business days after the clinic has received the results. If you do not hear from us within 7 days, please contact the clinic through  MapMyIDt or phone. If you have a critical or abnormal lab result, we will notify you by phone as soon as possible.  Submit refill requests through Asanti or call your pharmacy and they will forward the refill request to us. Please allow 3 business days for your refill to be completed.          Additional Information About Your Visit        RMIharGetourguide Information     Asanti gives you secure access to your electronic health record. If you see a primary care provider, you can also send messages to your care team and make appointments. If you have questions, please call your primary care clinic.  If you do not have a primary care provider, please call [phone] and they will assist you.        Care EveryWhere ID     This is your Care EveryWhere ID. This could be used by other organizations to access your [facility] medical records  [id number]        Your Vitals Were     Pulse Temperature Respirations Pulse Oximetry BMI (Body Mass Index)       51 97.7  F (36.5  C) (Oral) 20 99% 33.54 kg/m2        Blood Pressure from Last 3 Encounters:   02/06/18 130/57   02/01/18 152/61   01/27/18 129/63    Weight from Last 3 Encounters:   02/06/18 155 lb (70.3 kg)   02/01/18 154 lb (69.9 kg)   01/27/18 154 lb 11.2 oz (70.2 kg)              Today, you had the following     No orders found for display         Today's Medication Changes          These changes are accurate as of 2/6/18 12:18 PM.  If you have any questions, ask your nurse or doctor.               Start taking these medicines.        Dose/Directions    diazepam 2 MG tablet   Commonly known as:  VALIUM   Used for:  THERON (generalized anxiety disorder)   Started by:  Pop Zapien MD        Dose:  2 mg   Take 1 tablet (2 mg) by mouth every 12 hours as needed for anxiety or sleep   Quantity:  20 tablet   Refills:  0         These medicines have changed or have updated prescriptions.        Dose/Directions    sertraline 50 MG tablet   Commonly known as:  ZOLOFT   This  may have changed:    - medication strength  - how much to take   Used for:  THERON (generalized anxiety disorder)   Changed by:  Pop Zapien MD        Dose:  50 mg   Take 1 tablet (50 mg) by mouth daily   Quantity:  90 tablet   Refills:  0            Where to get your medicines      These medications were sent to [facility] [place] [id number]  [facility], [facility] [address]     Phone:  [phone]     sertraline 50 MG tablet         Some of these will need a paper prescription and others can be bought over the counter.  Ask your nurse if you have questions.     Bring a paper prescription for each of these medications     diazepam 2 MG tablet                Primary Care Provider Office Phone # Fax #    Pop Zapien [phone]       [address] [place] [address]        Equal Access to Services     [facility] Center[facility] : Hadkiana wu Sogabo, waaxda damasoqadaha, qaybta kaalmada saira, maldonado seay . So [facility] [phone].    ATENCIÓN: Si habla español, tiene a sierra disposición servicios gratuitos de asistencia lingüística. [facility] [phone].    We comply with applicable federal civil rights laws and [place] laws. We do not discriminate on the basis of race, color, national origin, age, disability, sex, sexual orientation, or gender identity.            Thank you!     Thank you for choosing [facility]  for your care. Our goal is always to provide you with excellent care. Hearing back from our patients is one way we can continue to improve our services. Please take a few minutes to complete the written survey that you may receive in the mail after your visit with us. Thank you!             Your Updated Medication List - Protect others around you: Learn how to safely use, store and throw away your medicines at www.disposemymeds.org.          This list is accurate as of 2/6/18 12:18 PM.  " Always use your most recent med list.                   Brand Name Dispense Instructions for use Diagnosis    acetaminophen 650 MG 8 hour tablet     100 tablet    Take 650 mg by mouth every 8 hours as needed for mild pain or fever    Ascending cholangitis       alendronate 70 MG tablet    FOSAMAX    12 tablet    TAKE 1 TABLET EVERY 7 DAYS AT LEAST 60 MIN BEFORE BREAKFAST AS DIRECTED \"SEE PACKAGE FOR ADDITIONAL INSTRUCTIONS\"    High risk medication use, Osteopenia       ASPIRIN PO      Take 81 mg by mouth At Bedtime        Calcium carb-Vitamin D 500 mg [place]-200 units 500-200 MG-UNIT per tablet    OSCAL with D;Oyster Shell Calcium     Take 1 tablet by mouth 2 times daily (with meals)        CRANBERRY CONCENTRATE PO      Take 1 capsule by mouth 2 times daily        cycloSPORINE 0.05 % ophthalmic emulsion    RESTASIS     Place 1 drop into both eyes 2 times daily        diazepam 2 MG tablet    VALIUM    20 tablet    Take 1 tablet (2 mg) by mouth every 12 hours as needed for anxiety or sleep    THERON (generalized anxiety disorder)       ferrous sulfate 325 (65 FE) MG tablet    IRON     Take 1 tablet (325 mg) by mouth daily (with breakfast)    Iron deficiency anemia, unspecified iron deficiency anemia type       Fish Oil 500 MG Caps      Take 1 capsule by mouth 2 times daily        furosemide 20 MG tablet    LASIX    30 tablet    Take 1 tablet (20 mg) by mouth daily    Elevated troponin       irbesartan 300 MG tablet    AVAPRO    90 tablet    TAKE 1 TABLET EVERY DAY    Essential hypertension with goal blood pressure less than 140/90       lovastatin 40 MG tablet    MEVACOR    90 tablet    TAKE 1 TABLET AT BEDTIME (HYPERLIPIDEMIA LDL GOAL  BELOW 130)    Hyperlipidemia LDL goal <130       melatonin 3 MG tablet     90 tablet    Take 1 tablet (3 mg) by mouth nightly as needed for sleep    Insomnia       methimazole 5 MG tablet    TAPAZOLE    270 tablet    Take 1 tablet (5 mg) by mouth 3 times daily    Nontoxic multinodular " goiter       nitroGLYcerin 0.4 MG sublingual tablet    NITROSTAT    25 tablet    For chest pain place 1 tablet under the tongue every 5 minutes for 3 doses. If symptoms persist 5 minutes after 1st dose call 911.    Elevated troponin       omeprazole 20 MG CR capsule    priLOSEC    180 capsule    Take 1 capsule (20 mg) by mouth daily    Gastroesophageal reflux disease without esophagitis       polyethylene glycol Packet    MIRALAX/GLYCOLAX    7 packet    Take 17 g by mouth 2 times daily as needed for constipation    Other constipation       prednisoLONE acetate 1 % ophthalmic susp    PRED FORTE     Place 1 drop into the right eye 4 times daily Taper as directed        propranolol 60 MG 24 hr capsule    INDERAL LA    90 capsule    Take 1 capsule (60 mg) by mouth daily    Nontoxic multinodular goiter       sertraline 50 MG tablet    ZOLOFT    90 tablet    Take 1 tablet (50 mg) by mouth daily    THERON (generalized anxiety disorder)       SYSTANE 0.4-0.3 % Soln ophthalmic solution   Generic drug:  polyethylene glycol 0.4%- propylene glycol 0.3%      Place 1 drop into both eyes 3 times daily as needed for dry eyes

## 2018-02-06 NOTE — TELEPHONE ENCOUNTER
"Patient called and spoke with writer.    Per patient:  1. Ongoing treatment for heart attack she had in December 2017 and a \"bad\" thyroid condition and bladder infection   A. Thyroid condition causes her to shake  2. Diarrhea since 2/3/18 and anxiety  3. On so many medications, it is overwhelming  4. Would like appt with PCP today, has pre-op scheduled tomorrow and does not think she can wait that long    Writer informed patient:  1. Writer can schedule patient office visit with PCP today, but unlikely pre-op and acute issues will be able to be addressed in one visit    Patient agreeable to acute visit today and pre-op tomorrow.    Appt scheduled this morning with Dr. Zapien.  Appt date, time and location confirmed with patient.    SHASHANK MurrellN, RN    "

## 2018-02-07 ENCOUNTER — OFFICE VISIT (OUTPATIENT)
Dept: FAMILY MEDICINE | Facility: CLINIC | Age: 83
End: 2018-02-07
Payer: MEDICARE

## 2018-02-07 VITALS
RESPIRATION RATE: 10 BRPM | HEART RATE: 51 BPM | BODY MASS INDEX: 33.11 KG/M2 | DIASTOLIC BLOOD PRESSURE: 36 MMHG | TEMPERATURE: 97.6 F | WEIGHT: 153 LBS | OXYGEN SATURATION: 98 % | SYSTOLIC BLOOD PRESSURE: 102 MMHG

## 2018-02-07 DIAGNOSIS — Z01.818 PRE-OP EXAM: Primary | ICD-10-CM

## 2018-02-07 DIAGNOSIS — E05.90 HYPERTHYROIDISM: ICD-10-CM

## 2018-02-07 DIAGNOSIS — R31.0 GROSS HEMATURIA: ICD-10-CM

## 2018-02-07 DIAGNOSIS — I48.91 ATRIAL FIBRILLATION, UNSPECIFIED TYPE (H): ICD-10-CM

## 2018-02-07 DIAGNOSIS — I10 HYPERTENSION GOAL BP (BLOOD PRESSURE) < 140/90: ICD-10-CM

## 2018-02-07 DIAGNOSIS — F41.9 ANXIETY: ICD-10-CM

## 2018-02-07 LAB
HGB BLD-MCNC: 12.5 G/DL (ref 11.7–15.7)
T3 SERPL-MCNC: 82 NG/DL (ref 60–181)

## 2018-02-07 PROCEDURE — 93000 ELECTROCARDIOGRAM COMPLETE: CPT | Performed by: FAMILY MEDICINE

## 2018-02-07 PROCEDURE — 99215 OFFICE O/P EST HI 40 MIN: CPT | Performed by: FAMILY MEDICINE

## 2018-02-07 PROCEDURE — 85018 HEMOGLOBIN: CPT | Performed by: INTERNAL MEDICINE

## 2018-02-07 PROCEDURE — 84443 ASSAY THYROID STIM HORMONE: CPT | Performed by: INTERNAL MEDICINE

## 2018-02-07 PROCEDURE — 36415 COLL VENOUS BLD VENIPUNCTURE: CPT | Performed by: INTERNAL MEDICINE

## 2018-02-07 PROCEDURE — 84439 ASSAY OF FREE THYROXINE: CPT | Performed by: INTERNAL MEDICINE

## 2018-02-07 PROCEDURE — 84480 ASSAY TRIIODOTHYRONINE (T3): CPT | Performed by: INTERNAL MEDICINE

## 2018-02-07 NOTE — LETTER
My Depression Action Plan  Name: Dimple Oviedo   Date of Birth 6/23/1933  Date: 2/7/2018    My doctor: Pop Zapien   My clinic: [address] [address] [phone]          GREEN    ZONE   Good Control    What it looks like:     Things are going generally well. You have normal up s and down s. You may even feel depressed from time to time, but bad moods usually last less than a day.   What you need to do:  1. Continue to care for yourself (see self care plan)  2. Check your depression survival kit and update it as needed  3. Follow your physician s recommendations including any medication.  4. Do not stop taking medication unless you consult with your physician first.           YELLOW         ZONE Getting Worse    What it looks like:     Depression is starting to interfere with your life.     It may be hard to get out of bed; you may be starting to isolate yourself from others.    Symptoms of depression are starting to last most all day and this has happened for several days.     You may have suicidal thoughts but they are not constant.   What you need to do:     1. Call your care team, your response to treatment will improve if you keep your care team informed of your progress. Yellow periods are signs an adjustment may need to be made.     2. Continue your self-care, even if you have to fake it!    3. Talk to someone in your support network    4. Open up your depression survival kit           RED    ZONE Medical Alert - Get Help    What it looks like:     Depression is seriously interfering with your life.     You may experience these or other symptoms: You can t get out of bed most days, can t work or engage in other necessary activities, you have trouble taking care of basic hygiene, or basic responsibilities, thoughts of suicide or death that will not go away, self-injurious behavior.     What you need to do:  1. Call your care  team and request a same-day appointment. If they are not available (weekends or after hours) call your local crisis line, emergency room or 911.      Electronically signed by: Amberly De León, February 7, 2018    Depression Self Care Plan / Survival Kit    Self-Care for Depression  Here s the deal. Your body and mind are really not as separate as most people think.  What you do and think affects how you feel and how you feel influences what you do and think. This means if you do things that people who feel good do, it will help you feel better.  Sometimes this is all it takes.  There is also a place for medication and therapy depending on how severe your depression is, so be sure to consult with your medical provider and/ or Behavioral Health Consultant if your symptoms are worsening or not improving.     In order to better manage my stress, I will:    Exercise  Get some form of exercise, every day. This will help reduce pain and release endorphins, the  feel good  chemicals in your brain. This is almost as good as taking antidepressants!  This is not the same as joining a gym and then never going! (they count on that by the way ) It can be as simple as just going for a walk or doing some gardening, anything that will get you moving.      Hygiene   Maintain good hygiene (Get out of bed in the morning, Make your bed, Brush your teeth, Take a shower, and Get dressed like you were going to work, even if you are unemployed).  If your clothes don't fit try to get ones that do.    Diet  I will strive to eat foods that are good for me, drink plenty of water, and avoid excessive sugar, caffeine, alcohol, and other mood-altering substances.  Some foods that are helpful in depression are: complex carbohydrates, B vitamins, flaxseed, fish or fish oil, fresh fruits and vegetables.    Psychotherapy  I agree to participate in Individual Therapy (if recommended).    Medication  If prescribed medications, I agree to take them.   Missing doses can result in serious side effects.  I understand that drinking alcohol, or other illicit drug use, may cause potential side effects.  I will not stop my medication abruptly without first discussing it with my provider.    Staying Connected With Others  I will stay in touch with my friends, family members, and my primary care provider/team.    Use your imagination  Be creative.  We all have a creative side; it doesn t matter if it s oil painting, sand castles, or mud pies! This will also kick up the endorphins.    Witness Beauty  (AKA stop and smell the roses) Take a look outside, even in mid-winter. Notice colors, textures. Watch the squirrels and birds.     Service to others  Be of service to others.  There is always someone else in need.  By helping others we can  get out of ourselves  and remember the really important things.  This also provides opportunities for practicing all the other parts of the program.    Humor  Laugh and be silly!  Adjust your TV habits for less news and crime-drama and more comedy.    Control your stress  Try breathing deep, massage therapy, biofeedback, and meditation. Find time to relax each day.     My support system    Clinic Contact:  Phone number:    Contact 1:  Phone number:    Contact 2:  Phone number:    [place]/:  Phone number:    Therapist:  Phone number:    Local crisis center:    Phone number:    Other community support:  Phone number:

## 2018-02-07 NOTE — PROGRESS NOTES
[place] [id number] [place] [id number] [phone]  Dept: [phone]    PRE-OP EVALUATION:  Today's date: 2018    Dimple Oviedo (: 1933) presents for pre-operative evaluation assessment as requested by Kenna Mendez MD.  She requires evaluation and anesthesia risk assessment prior to undergoing surgery/procedure for treatment of Cystoscopy, Transurethral Resection of Bladder Tumor .  Proposed procedure: COMBINED CYSTOSCOPY, TRANSURETHRAL RESECTION (TUR) TUMOR BLADDER    Date of Surgery/ Procedure: 2018  Time of Surgery/ Procedure: 8:30am  Hospital/Surgical Facility: [facility]   Fax number for surgical facility: 8:30 am  Primary Physician: Pop Zapien  Type of Anesthesia Anticipated: General    Patient has a Health Care Directive or Living Will:  YES - on  file     Preop Questions 2018   1.  Do you have a history of heart attack, stroke, stent, bypass or surgery on an artery in the head, neck, heart or legs? YES -     2.  Do you ever have any pain or discomfort in your chest? YES -     3.  Do you have a history of  Heart Failure? YES -     4.   Are you troubled by shortness of breath when:  walking on a level surface, or up a slight hill, or at night? YES -     5.  Do you currently have a cold, bronchitis or other respiratory infection? No   6.  Do you have a cough, shortness of breath, or wheezing? No   7.  Do you sometimes get pains in the calves of your legs when you walk? No   8. Do you or anyone in your family have previous history of blood clots? No   9.  Do you or does anyone in your family have a serious bleeding problem such as prolonged bleeding following surgeries or cuts? No   10. Have you ever had problems with anemia or been told to take iron pills? YES -     11. Have you had any abnormal blood loss such as black, tarry or bloody stools, or abnormal vaginal bleeding? YES -     12. Have you ever had a  blood transfusion? No   13. Have you or any of your relatives ever had problems with anesthesia? No   14. Do you have sleep apnea, excessive snoring or daytime drowsiness? No   15. Do you have any prosthetic heart valves? No   16. Do you have prosthetic joints? No   17. Is there any chance that you may be pregnant? No         HPI:                                                      Brief HPI related to upcoming procedure: blood in urine. Going for cystoscopy.     Feels like it is tiny bit better but still feels really shaky.  Was able to sleep somewhat better yesterday.  Still really nervous that they are going to cancel her surgery as her blood pressure is low today.    MEDICAL HISTORY:                                                      Patient Active Problem List    Diagnosis Date Noted     Atrial fibrillation, unspecified type (H) 01/25/2018     Priority: Medium     A-fib (H) 01/16/2018     Priority: Medium     Stress-induced cardiomyopathy 12/14/2017     Priority: Medium     NSTEMI (non-ST elevated myocardial infarction) (H) 12/13/2017     Priority: Medium     Iron deficiency anemia, unspecified iron deficiency anemia type 11/30/2017     Priority: Medium     Obesity, unspecified obesity severity, unspecified obesity type 02/22/2017     Priority: Medium     Chronic bilateral low back pain without sciatica 12/05/2016     Priority: Medium     Impaired fasting blood sugar 11/24/2015     Priority: Medium     Sepsis (H) 10/30/2015     Priority: Medium     Shoulder pain 10/31/2014     Priority: Medium     High risk medication use 10/15/2014     Priority: Medium     Polymyalgia rheumatica (H) 07/10/2014     Priority: Medium     Hip arthritis 06/23/2014     Priority: Medium     Hypertension goal BP (blood pressure) < 140/90 11/30/2011     Priority: Medium     Urinary incontinence 11/30/2011     Priority: Medium     Osteoarthritis of left knee 11/30/2011     Priority: Medium     Hyperlipidemia LDL goal <130 05/09/2010      Priority: Medium     Nontoxic multinodular goiter      Priority: Medium     Malignant melanoma of skin of trunk, except scrotum (H)      Priority: Medium     Undiagnosed cardiac murmurs      Priority: Medium     perirectal cyst 12/16/2005     Priority: Medium     restless leg 10/12/2005     Priority: Medium     heat intolerance 10/12/2005     Priority: Medium     Goiter 10/12/2005     Priority: Medium     Problem list name updated by automated process. Provider to review       Disorder of bone and cartilage 10/12/2005     Priority: Medium     Problem list name updated by automated process. Provider to review       Other psoriasis 10/12/2005     Priority: Medium     Esophageal reflux 09/22/2004     Priority: Medium      Past Medical History:   Diagnosis Date     Calculus of kidney      Esophageal reflux      GERD (gastroesophageal reflux disease)      Hyperlipidemia LDL goal <130 5/9/2010     Malignant melanoma of skin of trunk, except scrotum (H)      Nonspecific abnormal finding     has living will 2004 -      Nontoxic multinodular goiter     no further eval /tx rec per pt     Osteopenia      Other psoriasis      Personal history of colonic polyps      PMR (polymyalgia rheumatica) (H)      Stress-induced cardiomyopathy      Undiagnosed cardiac murmurs      Unspecified constipation      Unspecified essential hypertension      Past Surgical History:   Procedure Laterality Date     C NONSPECIFIC PROCEDURE  2005    colonoscopy polyp repeat 2010     ENDOSCOPIC ULTRASOUND LOWER GASTROINTESTIONAL TRACT (GI) N/A 10/30/2015    Procedure: ENDOSCOPIC ULTRASOUND LOWER GASTROINTESTIONAL TRACT (GI);  Surgeon: Daniel Jean-Baptiste MD;  Location: [facility] OR     LAPAROSCOPIC CHOLECYSTECTOMY WITH CHOLANGIOGRAMS N/A 11/1/2015    Procedure: LAPAROSCOPIC CHOLECYSTECTOMY WITH CHOLANGIOGRAMS;  Surgeon: Tonie Warren MD;  Location: [facility] OR     SURGICAL HISTORY OF -   1996    malignant melanoma     SURGICAL HISTORY OF -   1968     "thyroid nodule     SURGICAL HISTORY OF -       D & C     Current Outpatient Prescriptions   Medication Sig Dispense Refill     diazepam (VALIUM) 2 MG tablet Take 1 tablet (2 mg) by mouth every 12 hours as needed for anxiety or sleep 20 tablet 0     sertraline (ZOLOFT) 50 MG tablet Take 1 tablet (50 mg) by mouth daily 90 tablet 0     ferrous sulfate (IRON) 325 (65 FE) MG tablet Take 1 tablet (325 mg) by mouth daily (with breakfast)       irbesartan (AVAPRO) 300 MG tablet TAKE 1 TABLET EVERY DAY 90 tablet 0     propranolol (INDERAL LA) 60 MG 24 hr capsule Take 1 capsule (60 mg) by mouth daily 90 capsule 0     methimazole (TAPAZOLE) 5 MG tablet Take 1 tablet (5 mg) by mouth 3 times daily 270 tablet 0     ASPIRIN PO Take 81 mg by mouth At Bedtime       Calcium carb-Vitamin D 500 mg [place]-200 units (OSCAL WITH D;OYSTER SHELL CALCIUM) 500-200 MG-UNIT per tablet Take 1 tablet by mouth 2 times daily (with meals)       cycloSPORINE (RESTASIS) 0.05 % ophthalmic emulsion Place 1 drop into both eyes 2 times daily       polyethylene glycol 0.4%- propylene glycol 0.3% (SYSTANE) 0.4-0.3 % SOLN ophthalmic solution Place 1 drop into both eyes 3 times daily as needed for dry eyes       furosemide (LASIX) 20 MG tablet Take 1 tablet (20 mg) by mouth daily 30 tablet 3     nitroGLYcerin (NITROSTAT) 0.4 MG sublingual tablet For chest pain place 1 tablet under the tongue every 5 minutes for 3 doses. If symptoms persist 5 minutes after 1st dose call 911. 25 tablet 3     CRANBERRY CONCENTRATE PO Take 1 capsule by mouth 2 times daily       lovastatin (MEVACOR) 40 MG tablet TAKE 1 TABLET AT BEDTIME (HYPERLIPIDEMIA LDL GOAL  BELOW 130) 90 tablet 2     omeprazole (PRILOSEC) 20 MG CR capsule Take 1 capsule (20 mg) by mouth daily 180 capsule 3     alendronate (FOSAMAX) 70 MG tablet TAKE 1 TABLET EVERY 7 DAYS AT LEAST 60 MIN BEFORE BREAKFAST AS DIRECTED \"SEE PACKAGE FOR ADDITIONAL INSTRUCTIONS\" 12 tablet 2     acetaminophen 650 MG TABS Take 650 mg " by mouth every 8 hours as needed for mild pain or fever 100 tablet 0     polyethylene glycol (MIRALAX/GLYCOLAX) packet Take 17 g by mouth 2 times daily as needed for constipation 7 packet 0     Omega-3 Fatty Acids (FISH OIL) 500 MG CAPS Take 1 capsule by mouth 2 times daily        melatonin 3 MG tablet Take 1 tablet (3 mg) by mouth nightly as needed for sleep 90 tablet 0     OTC products: None, except as noted above    Allergies   Allergen Reactions     No Known Drug Allergies       Latex Allergy: NO    Social History   Substance Use Topics     Smoking status: Never Smoker     Smokeless tobacco: Never Used     Alcohol use No      Comment: 6 times per year-one drink     History   Drug Use No       REVIEW OF SYSTEMS:                                                    Constitutional, neuro, ENT, endocrine, pulmonary, cardiac, gastrointestinal, genitourinary, musculoskeletal, integument and psychiatric systems are negative, except as otherwise noted.    EXAM:                                                    BP (!) 102/36  Pulse 51  Temp 97.6  F (36.4  C) (Oral)  Resp 10  Wt 153 lb (69.4 kg)  SpO2 98%  BMI 33.11 kg/m2    GENERAL APPEARANCE: healthy, alert and no distress     EYES: EOMI, PERRL     HENT: ear canals and TM's normal and nose and mouth without ulcers or lesions     NECK: no adenopathy, no asymmetry, masses, or scars and thyroid normal to palpation     RESP: lungs clear to auscultation - no rales, rhonchi or wheezes     CV: regular rates and rhythm,      ABDOMEN:  soft, nontender, no HSM or masses and bowel sounds normal     MS: deferred     SKIN: no suspicious lesions or rashes     NEURO: Normal strength and tone, sensory exam grossly normal, mentation intact and speech normal     PSYCH: Anxious.  Definitely better than yesterday.  Not having crying spells today.  Took her Valium today.    DIAGNOSTICS:                                                    EKG: Sinus  Bradycardia   -Poor R-wave  progression   Results for orders placed or performed in visit on 02/07/18   Hemoglobin   Result Value Ref Range    Hemoglobin 12.5 11.7 - 15.7 g/dL         Recent Labs   Lab Test  01/27/18   1213  01/24/18   1334   01/17/18   0505  01/16/18   1247  01/16/18   0646   12/14/17   1152  12/14/17   0722   12/13/17   1659   HGB   --    --    --   12.1   --   12.5   --   12.9  Canceled, Test credited   --    --    PLT   --    --    --   283   --   293   --   274  Canceled, Test credited   --    --    INR   --    --    --    --   1.71*   --    --    --    --    --   1.02   NA  130*  125*   < >  132*   --   133   < >   --    --    < >   --    POTASSIUM  4.8  4.9   < >  5.3  5.3  4.9   < >  3.9   --    < >   --    CR  0.92  0.97   < >  1.27*   --   0.76   < >   --    --    < >   --    A1C   --    --    --    --    --    --    --   6.2*  Canceled, Test credited   --    --     < > = values in this interval not displayed.        IMPRESSION:                                                    Reason for surgery/procedure: Gross hematuria  Diagnosis/reason for consult: Preop    The proposed surgical procedure is considered INTERMEDIATE risk.    REVISED CARDIAC RISK INDEX  The patient has the following serious cardiovascular risks for perioperative complications such as (MI, PE, VFib and 3  AV Block):  Coronary Artery Disease (MI, positive stress test, angina, Qs on EKG)  Congestive Heart Failure (pulmonary edema, PND, s3 mary beth, CXR with pulmonary congestion, basilar rales)  INTERPRETATION: 2 risks: Class III (moderate risk - 6.6% complication rate)    The patient has the following additional risks for perioperative complications:   See below      ICD-10-CM    1. Pre-op exam Z01.818 EKG 12-lead complete w/read - Clinics     Hemoglobin   2. Gross hematuria R31.0    3. Hyperthyroidism E05.90 TSH     T4 free     T3 total   4. Hypertension goal BP (blood pressure) < 140/90 I10    5. Atrial fibrillation, unspecified type (H) I48.91     6. Anxiety F41.9        RECOMMENDATIONS:                                                      --Consult hospital rounder / IM to assist post-op medical management if any complication arises.      --She is going to stop NSAIDs and fish oil a week before surgery    --.  She has a slightly lower blood pressure than her usual and I recommended her to hold off on Avapro on the day of the surgery    --She is still significantly anxious and constantly worrying about her upcoming procedure and I recommended her to take Valium as needed on the day of surgery.  She is working with endocrinologist and I am hoping her hyperthyroidism symptoms improves and her anxiety and jitteriness would improve along with it.  She is needing a repeat thyroid function test as per her daughter-in-law.  I increased her Zoloft yesterday.    --She is nervous about messing up her medications on the day of surgery and we agreed to hold off on all of her medication on the day of the surgery and she can take it after her procedure.      APPROVAL GIVEN to proceed with proposed procedure, without further diagnostic evaluation       Signed Electronically by: Pop Zapien MD, MD    Copy of this evaluation report is provided to requesting physician.    [facility] Preop Guidelines

## 2018-02-07 NOTE — MR AVS SNAPSHOT
After Visit Summary   2/7/2018    Dimple Oviedo    MRN: [id number]           Patient Information     Date Of Birth          6/23/1933        Visit Information        Provider Department      2/7/2018 1:20 PM Pop Zapien MD [facility]        Today's Diagnoses     Pre-op exam    -  1    Gross hematuria        Hyperthyroidism        Hypertension goal BP (blood pressure) < 140/90        Atrial fibrillation, unspecified type (H)        Anxiety          Care Instructions      Before Your Surgery      Call your surgeon if there is any change in your health. This includes signs of a cold or flu (such as a sore throat, runny nose, cough, rash or fever).    Do not smoke, drink alcohol or take over the counter medicine (unless your surgeon or primary care doctor tells you to) for the 24 hours before and after surgery.    If you take prescribed drugs: Follow your doctor s orders about which medicines to take and which to stop until after surgery.    Eating and drinking prior to surgery: follow the instructions from your surgeon    Take a shower or bath the night before surgery. Use the soap your surgeon gave you to gently clean your skin. If you do not have soap from your surgeon, use your regular soap. Do not shave or scrub the surgery site.  Wear clean pajamas and have clean sheets on your bed.           Follow-ups after your visit        Your next 10 appointments already scheduled     Feb 19, 2018   Procedure with Kenna La MD   [facility] [place], Same Day Surgery (--)    [facility] [address]   [phone]            Feb 26, 2018 11:30 AM CST   Lab with  LAB   [facility])    [address] [place] [id number]   [phone]            Feb 26, 2018 12:00 PM CST   (Arrive by 11:45 AM)   CORE RETURN with [facility] ([facility])     [address]  [address]  [facility] [address] [phone]            Mar 08, 2018  9:15 AM CST   (Arrive by 9:00 AM)   Post-Op with Kenna La MD   [facility] ([facility])    [address]  4th Floor  [facility] [id number] [phone]              Who to contact     If you have questions or need follow up information about today's clinic visit or your schedule please contact [facility] directly at [phone].  Normal or non-critical lab and imaging results will be communicated to you by MyChart, letter or phone within 4 business days after the clinic has received the results. If you do not hear from us within 7 days, please contact the clinic through [employer] or phone. If you have a critical or abnormal lab result, we will notify you by phone as soon as possible.  Submit refill requests through [employer] or call your pharmacy and they will forward the refill request to us. Please allow 3 business days for your refill to be completed.          Additional Information About Your Visit        [employer] Information     [employer] gives you secure access to your electronic health record. If you see a primary care provider, you can also send messages to your care team and make appointments. If you have questions, please call your primary care clinic.  If you do not have a primary care provider, please call [phone] and they will assist you.        Care EveryWhere ID     This is your Care EveryWhere ID. This could be used by other organizations to access your [employer] medical records  [id number]        Your Vitals Were     Pulse Temperature Respirations Pulse Oximetry BMI (Body Mass Index)       51 97.6  F (36.4  C) (Oral) 10 98% 33.11 kg/m2        Blood Pressure from Last 3 Encounters:   02/07/18 (!) 102/36   02/06/18 130/57   02/01/18 152/61    Weight from Last 3 Encounters:   02/07/18 153 lb  "(69.4 kg)   02/06/18 155 lb (70.3 kg)   02/01/18 154 lb (69.9 kg)              We Performed the Following     DEPRESSION ACTION PLAN (DAP)     EKG 12-lead complete w/read - Clinics     Hemoglobin     T3 total     T4 free     TSH        Primary Care Provider Office Phone # Fax #    Pop Antonino Zapien [phone] [address] [place] [address]        Equal Access to Services     GUNNER RODRIGUEZ : Hadii aad ku hadasho Soomaali, waaxda luqadaha, qaybta kaalmada adeegyada, waxay idiin hayaan adeeg kelseaaraannemarie la'ele . So [facility] [phone].    ATENCIÓN: Si habla español, tiene a sierra disposición servicios gratuitos de asistencia lingüística. [facility] [phone].    We comply with applicable federal civil rights laws and [place] laws. We do not discriminate on the basis of race, color, national origin, age, disability, sex, sexual orientation, or gender identity.            Thank you!     Thank you for choosing [facility]  for your care. Our goal is always to provide you with excellent care. Hearing back from our patients is one way we can continue to improve our services. Please take a few minutes to complete the written survey that you may receive in the mail after your visit with us. Thank you!             Your Updated Medication List - Protect others around you: Learn how to safely use, store and throw away your medicines at www.disposemymeds.org.          This list is accurate as of 2/7/18  1:42 PM.  Always use your most recent med list.                   Brand Name Dispense Instructions for use Diagnosis    acetaminophen 650 MG 8 hour tablet     100 tablet    Take 650 mg by mouth every 8 hours as needed for mild pain or fever    Ascending cholangitis       alendronate 70 MG tablet    FOSAMAX    12 tablet    TAKE 1 TABLET EVERY 7 DAYS AT LEAST 60 MIN BEFORE BREAKFAST AS DIRECTED \"SEE PACKAGE FOR ADDITIONAL INSTRUCTIONS\"    High risk medication use, Osteopenia       " ASPIRIN PO      Take 81 mg by mouth At Bedtime        Calcium carb-Vitamin D 500 mg [place]-200 units 500-200 MG-UNIT per tablet    OSCAL with D;Oyster Shell Calcium     Take 1 tablet by mouth 2 times daily (with meals)        CRANBERRY CONCENTRATE PO      Take 1 capsule by mouth 2 times daily        cycloSPORINE 0.05 % ophthalmic emulsion    RESTASIS     Place 1 drop into both eyes 2 times daily        diazepam 2 MG tablet    VALIUM    20 tablet    Take 1 tablet (2 mg) by mouth every 12 hours as needed for anxiety or sleep    THERON (generalized anxiety disorder)       ferrous sulfate 325 (65 FE) MG tablet    IRON     Take 1 tablet (325 mg) by mouth daily (with breakfast)    Iron deficiency anemia, unspecified iron deficiency anemia type       Fish Oil 500 MG Caps      Take 1 capsule by mouth 2 times daily        furosemide 20 MG tablet    LASIX    30 tablet    Take 1 tablet (20 mg) by mouth daily    Elevated troponin       irbesartan 300 MG tablet    AVAPRO    90 tablet    TAKE 1 TABLET EVERY DAY    Essential hypertension with goal blood pressure less than 140/90       lovastatin 40 MG tablet    MEVACOR    90 tablet    TAKE 1 TABLET AT BEDTIME (HYPERLIPIDEMIA LDL GOAL  BELOW 130)    Hyperlipidemia LDL goal <130       melatonin 3 MG tablet     90 tablet    Take 1 tablet (3 mg) by mouth nightly as needed for sleep    Insomnia       methimazole 5 MG tablet    TAPAZOLE    270 tablet    Take 1 tablet (5 mg) by mouth 3 times daily    Nontoxic multinodular goiter       nitroGLYcerin 0.4 MG sublingual tablet    NITROSTAT    25 tablet    For chest pain place 1 tablet under the tongue every 5 minutes for 3 doses. If symptoms persist 5 minutes after 1st dose call 911.    Elevated troponin       omeprazole 20 MG CR capsule    priLOSEC    180 capsule    Take 1 capsule (20 mg) by mouth daily    Gastroesophageal reflux disease without esophagitis       polyethylene glycol Packet    MIRALAX/GLYCOLAX    7 packet    Take 17 g by mouth 2  times daily as needed for constipation    Other constipation       prednisoLONE acetate 1 % ophthalmic susp    PRED FORTE     Place 1 drop into the right eye 4 times daily Taper as directed        propranolol 60 MG 24 hr capsule    INDERAL LA    90 capsule    Take 1 capsule (60 mg) by mouth daily    Nontoxic multinodular goiter       sertraline 50 MG tablet    ZOLOFT    90 tablet    Take 1 tablet (50 mg) by mouth daily    THERON (generalized anxiety disorder)       SYSTANE 0.4-0.3 % Soln ophthalmic solution   Generic drug:  polyethylene glycol 0.4%- propylene glycol 0.3%      Place 1 drop into both eyes 3 times daily as needed for dry eyes

## 2018-02-08 ENCOUNTER — TELEPHONE (OUTPATIENT)
Dept: FAMILY MEDICINE | Facility: CLINIC | Age: 83
End: 2018-02-08

## 2018-02-08 LAB
T4 FREE SERPL-MCNC: 0.68 NG/DL (ref 0.76–1.46)
TSH SERPL DL<=0.005 MIU/L-ACNC: 1.54 MU/L (ref 0.4–4)

## 2018-02-08 NOTE — TELEPHONE ENCOUNTER
Patient states she was seen yesterday and her Zoloft was increased to 50 mg a day  Patient accidentally took 100 MG this morning  Thought she had put 2 of the 25 mg tabs in her pill box, but had put 2 of the 50 mg tabs in by mistake  Is wondering if she needs to worry.  Discussed she might have more side effects today.  Should push fluids and watch for drowsiness, tremor, fatigue, diarrhea, dyspepsia, nausea, insomnia, loose stools, dizziness, headache  She should call with any additional questions or concerns  Patient also wanted to let Dr Zapien know that she did not take the valium yesterday.  Raina Al RN

## 2018-02-14 DIAGNOSIS — E05.90 HYPERTHYROIDISM: ICD-10-CM

## 2018-02-14 PROCEDURE — 84443 ASSAY THYROID STIM HORMONE: CPT | Performed by: FAMILY MEDICINE

## 2018-02-14 PROCEDURE — 84439 ASSAY OF FREE THYROXINE: CPT | Performed by: INTERNAL MEDICINE

## 2018-02-14 PROCEDURE — 36415 COLL VENOUS BLD VENIPUNCTURE: CPT | Performed by: INTERNAL MEDICINE

## 2018-02-15 LAB
T4 FREE SERPL-MCNC: 0.76 NG/DL (ref 0.76–1.46)
TSH SERPL DL<=0.005 MIU/L-ACNC: 1.86 MU/L (ref 0.4–4)

## 2018-02-18 ENCOUNTER — ANESTHESIA EVENT (OUTPATIENT)
Dept: SURGERY | Facility: CLINIC | Age: 83
End: 2018-02-18
Payer: MEDICARE

## 2018-02-18 ASSESSMENT — ENCOUNTER SYMPTOMS: DYSRHYTHMIAS: 1

## 2018-02-18 NOTE — ANESTHESIA PREPROCEDURE EVALUATION
Anesthesia Evaluation     .             ROS/MED HX    ENT/Pulmonary:       Neurologic:       Cardiovascular:     (+) Dyslipidemia, hypertension----. Taking blood thinners : . CHF etiology: EF 65% (in december had stress cardiomyopathy diagnosed 12/13/2017) . . :. dysrhythmias a-fib, . Previous cardiac testing Echodate:12/13/2017results:   Normal left ventricular size with mild concentric hypertrophy.  Global akinesis with sparing of the basal segments consistent with stress  cardiomyopathy versus multivessel coronary artery disease. Moderately reduced  systolic function. EF 30-35%.  Normal right ventricular size and function.  Severe left atrial enlargement.  No pericardial effusiondate: results: date: results:Cath date: 12/13/2017 results:No obstructive disease          METS/Exercise Tolerance:     Hematologic:         Musculoskeletal:   (+) , , other musculoskeletal- polymyalga rheumatica      GI/Hepatic:     (+) GERD       Renal/Genitourinary: Comment: Cr 0.73, GFR 73 today    (+) chronic renal disease, type: CRI, Pt does not require dialysis,       Endo: Comment: H/O recent prednisone use for PMR    (+) thyroid problem  hyperthyroidism, .      Psychiatric:         Infectious Disease:  - neg infectious disease ROS (+) Other Infectious Disease (Ascending cholangitis)       Malignancy:   (+) Malignancy (Melanoma) History of Skin          Other:    - neg other ROS                                      Anesthesia Plan      History & Physical Review  History and physical reviewed and following examination; no interval change.    ASA Status:  3 .    NPO Status:  > 8 hours    Plan for General and LMA with Intravenous induction. Maintenance will be Balanced.    PONV prophylaxis:  Ondansetron (or other 5HT-3)       Postoperative Care  Postoperative pain management:  IV analgesics and Oral pain medications.      Consents  Anesthetic plan, risks, benefits and alternatives discussed with:  Patient..

## 2018-02-19 ENCOUNTER — ANESTHESIA (OUTPATIENT)
Dept: SURGERY | Facility: CLINIC | Age: 83
End: 2018-02-19
Payer: MEDICARE

## 2018-02-19 ENCOUNTER — HOSPITAL ENCOUNTER (OUTPATIENT)
Facility: CLINIC | Age: 83
Discharge: HOME | End: 2018-02-19
Attending: UROLOGY | Admitting: UROLOGY
Payer: MEDICARE

## 2018-02-19 VITALS
BODY MASS INDEX: 32.12 KG/M2 | RESPIRATION RATE: 14 BRPM | HEIGHT: 58 IN | TEMPERATURE: 97.7 F | SYSTOLIC BLOOD PRESSURE: 143 MMHG | WEIGHT: 153 LBS | OXYGEN SATURATION: 96 % | DIASTOLIC BLOOD PRESSURE: 66 MMHG

## 2018-02-19 DIAGNOSIS — R31.29 OTHER MICROSCOPIC HEMATURIA: Primary | ICD-10-CM

## 2018-02-19 LAB
ABO + RH BLD: NORMAL
ABO + RH BLD: NORMAL
BLD GP AB SCN SERPL QL: NORMAL
BLOOD BANK CMNT PATIENT-IMP: NORMAL
GLUCOSE SERPL-MCNC: 122 MG/DL (ref 70–99)
HGB BLD-MCNC: 12.3 G/DL (ref 11.7–15.7)
POTASSIUM SERPL-SCNC: 3.8 MMOL/L (ref 3.4–5.3)
SPECIMEN EXP DATE BLD: NORMAL

## 2018-02-19 PROCEDURE — 85018 HEMOGLOBIN: CPT | Performed by: ANESTHESIOLOGY

## 2018-02-19 PROCEDURE — 86850 RBC ANTIBODY SCREEN: CPT | Performed by: ANESTHESIOLOGY

## 2018-02-19 PROCEDURE — 25000128 H RX IP 250 OP 636: Performed by: UROLOGY

## 2018-02-19 PROCEDURE — 25000128 H RX IP 250 OP 636: Performed by: NURSE ANESTHETIST, CERTIFIED REGISTERED

## 2018-02-19 PROCEDURE — 86900 BLOOD TYPING SEROLOGIC ABO: CPT | Performed by: ANESTHESIOLOGY

## 2018-02-19 PROCEDURE — 25000566 ZZH SEVOFLURANE, EA 15 MIN: Performed by: UROLOGY

## 2018-02-19 PROCEDURE — 37000008 ZZH ANESTHESIA TECHNICAL FEE, 1ST 30 MIN: Performed by: UROLOGY

## 2018-02-19 PROCEDURE — 71000027 ZZH RECOVERY PHASE 2 EACH 15 MINS: Performed by: UROLOGY

## 2018-02-19 PROCEDURE — 86901 BLOOD TYPING SEROLOGIC RH(D): CPT | Performed by: ANESTHESIOLOGY

## 2018-02-19 PROCEDURE — 88305 TISSUE EXAM BY PATHOLOGIST: CPT | Mod: 26 | Performed by: UROLOGY

## 2018-02-19 PROCEDURE — 36415 COLL VENOUS BLD VENIPUNCTURE: CPT | Performed by: ANESTHESIOLOGY

## 2018-02-19 PROCEDURE — 00000159 ZZHCL STATISTIC H-SEND OUTS PREP: Performed by: UROLOGY

## 2018-02-19 PROCEDURE — 71000014 ZZH RECOVERY PHASE 1 LEVEL 2 FIRST HR: Performed by: UROLOGY

## 2018-02-19 PROCEDURE — 25000125 ZZHC RX 250: Performed by: NURSE ANESTHETIST, CERTIFIED REGISTERED

## 2018-02-19 PROCEDURE — 88305 TISSUE EXAM BY PATHOLOGIST: CPT | Performed by: UROLOGY

## 2018-02-19 PROCEDURE — 27210794 ZZH OR GENERAL SUPPLY STERILE: Performed by: UROLOGY

## 2018-02-19 PROCEDURE — 40000170 ZZH STATISTIC PRE-PROCEDURE ASSESSMENT II: Performed by: UROLOGY

## 2018-02-19 PROCEDURE — 37000009 ZZH ANESTHESIA TECHNICAL FEE, EACH ADDTL 15 MIN: Performed by: UROLOGY

## 2018-02-19 PROCEDURE — 36000051 ZZH SURGERY LEVEL 2 1ST 30 MIN - UMMC: Performed by: UROLOGY

## 2018-02-19 PROCEDURE — 36000053 ZZH SURGERY LEVEL 2 EA 15 ADDTL MIN - UMMC: Performed by: UROLOGY

## 2018-02-19 PROCEDURE — 82947 ASSAY GLUCOSE BLOOD QUANT: CPT | Performed by: ANESTHESIOLOGY

## 2018-02-19 PROCEDURE — 84132 ASSAY OF SERUM POTASSIUM: CPT | Performed by: ANESTHESIOLOGY

## 2018-02-19 RX ORDER — FENTANYL CITRATE 50 UG/ML
25-50 INJECTION, SOLUTION INTRAMUSCULAR; INTRAVENOUS
Status: DISCONTINUED | OUTPATIENT
Start: 2018-02-19 | End: 2018-02-19 | Stop reason: HOSPADM

## 2018-02-19 RX ORDER — ONDANSETRON 2 MG/ML
INJECTION INTRAMUSCULAR; INTRAVENOUS PRN
Status: DISCONTINUED | OUTPATIENT
Start: 2018-02-19 | End: 2018-02-19

## 2018-02-19 RX ORDER — PROPOFOL 10 MG/ML
INJECTION, EMULSION INTRAVENOUS CONTINUOUS PRN
Status: DISCONTINUED | OUTPATIENT
Start: 2018-02-19 | End: 2018-02-19

## 2018-02-19 RX ORDER — DEXAMETHASONE SODIUM PHOSPHATE 4 MG/ML
INJECTION, SOLUTION INTRA-ARTICULAR; INTRALESIONAL; INTRAMUSCULAR; INTRAVENOUS; SOFT TISSUE PRN
Status: DISCONTINUED | OUTPATIENT
Start: 2018-02-19 | End: 2018-02-19

## 2018-02-19 RX ORDER — SODIUM CHLORIDE, SODIUM LACTATE, POTASSIUM CHLORIDE, CALCIUM CHLORIDE 600; 310; 30; 20 MG/100ML; MG/100ML; MG/100ML; MG/100ML
INJECTION, SOLUTION INTRAVENOUS CONTINUOUS PRN
Status: DISCONTINUED | OUTPATIENT
Start: 2018-02-19 | End: 2018-02-19

## 2018-02-19 RX ORDER — GLYCOPYRROLATE 0.2 MG/ML
INJECTION, SOLUTION INTRAMUSCULAR; INTRAVENOUS PRN
Status: DISCONTINUED | OUTPATIENT
Start: 2018-02-19 | End: 2018-02-19

## 2018-02-19 RX ORDER — CEFAZOLIN SODIUM 1 G/3ML
1 INJECTION, POWDER, FOR SOLUTION INTRAMUSCULAR; INTRAVENOUS SEE ADMIN INSTRUCTIONS
Status: DISCONTINUED | OUTPATIENT
Start: 2018-02-19 | End: 2018-02-19 | Stop reason: HOSPADM

## 2018-02-19 RX ORDER — EPHEDRINE SULFATE 50 MG/ML
INJECTION, SOLUTION INTRAMUSCULAR; INTRAVENOUS; SUBCUTANEOUS PRN
Status: DISCONTINUED | OUTPATIENT
Start: 2018-02-19 | End: 2018-02-19

## 2018-02-19 RX ORDER — SODIUM CHLORIDE, SODIUM LACTATE, POTASSIUM CHLORIDE, CALCIUM CHLORIDE 600; 310; 30; 20 MG/100ML; MG/100ML; MG/100ML; MG/100ML
INJECTION, SOLUTION INTRAVENOUS CONTINUOUS
Status: DISCONTINUED | OUTPATIENT
Start: 2018-02-19 | End: 2018-02-19 | Stop reason: HOSPADM

## 2018-02-19 RX ORDER — CEFAZOLIN SODIUM 2 G/100ML
2 INJECTION, SOLUTION INTRAVENOUS
Status: COMPLETED | OUTPATIENT
Start: 2018-02-19 | End: 2018-02-19

## 2018-02-19 RX ORDER — PROPOFOL 10 MG/ML
INJECTION, EMULSION INTRAVENOUS PRN
Status: DISCONTINUED | OUTPATIENT
Start: 2018-02-19 | End: 2018-02-19

## 2018-02-19 RX ORDER — LIDOCAINE HYDROCHLORIDE 20 MG/ML
INJECTION, SOLUTION INFILTRATION; PERINEURAL PRN
Status: DISCONTINUED | OUTPATIENT
Start: 2018-02-19 | End: 2018-02-19

## 2018-02-19 RX ORDER — ONDANSETRON 4 MG/1
4 TABLET, ORALLY DISINTEGRATING ORAL EVERY 30 MIN PRN
Status: DISCONTINUED | OUTPATIENT
Start: 2018-02-19 | End: 2018-02-19 | Stop reason: HOSPADM

## 2018-02-19 RX ORDER — FENTANYL CITRATE 50 UG/ML
INJECTION, SOLUTION INTRAMUSCULAR; INTRAVENOUS PRN
Status: DISCONTINUED | OUTPATIENT
Start: 2018-02-19 | End: 2018-02-19

## 2018-02-19 RX ORDER — ONDANSETRON 2 MG/ML
4 INJECTION INTRAMUSCULAR; INTRAVENOUS EVERY 30 MIN PRN
Status: DISCONTINUED | OUTPATIENT
Start: 2018-02-19 | End: 2018-02-19 | Stop reason: HOSPADM

## 2018-02-19 RX ORDER — NALOXONE HYDROCHLORIDE 0.4 MG/ML
.1-.4 INJECTION, SOLUTION INTRAMUSCULAR; INTRAVENOUS; SUBCUTANEOUS
Status: DISCONTINUED | OUTPATIENT
Start: 2018-02-19 | End: 2018-02-19 | Stop reason: HOSPADM

## 2018-02-19 RX ADMIN — PROPOFOL 20 MCG/KG/MIN: 10 INJECTION, EMULSION INTRAVENOUS at 08:41

## 2018-02-19 RX ADMIN — FENTANYL CITRATE 50 MCG: 50 INJECTION, SOLUTION INTRAMUSCULAR; INTRAVENOUS at 08:54

## 2018-02-19 RX ADMIN — LIDOCAINE HYDROCHLORIDE 60 MG: 20 INJECTION, SOLUTION INFILTRATION; PERINEURAL at 08:38

## 2018-02-19 RX ADMIN — Medication 10 MG: at 09:29

## 2018-02-19 RX ADMIN — Medication 5 MG: at 08:43

## 2018-02-19 RX ADMIN — CEFAZOLIN SODIUM 2 G: 2 INJECTION, SOLUTION INTRAVENOUS at 08:44

## 2018-02-19 RX ADMIN — PROPOFOL 80 MG: 10 INJECTION, EMULSION INTRAVENOUS at 08:38

## 2018-02-19 RX ADMIN — Medication 0.2 MG: at 09:00

## 2018-02-19 RX ADMIN — SODIUM CHLORIDE, POTASSIUM CHLORIDE, SODIUM LACTATE AND CALCIUM CHLORIDE: 600; 310; 30; 20 INJECTION, SOLUTION INTRAVENOUS at 09:34

## 2018-02-19 RX ADMIN — DEXAMETHASONE SODIUM PHOSPHATE 4 MG: 4 INJECTION, SOLUTION INTRAMUSCULAR; INTRAVENOUS at 08:56

## 2018-02-19 RX ADMIN — Medication 5 MG: at 08:52

## 2018-02-19 RX ADMIN — MIDAZOLAM 2 MG: 1 INJECTION INTRAMUSCULAR; INTRAVENOUS at 08:31

## 2018-02-19 RX ADMIN — Medication 10 MG: at 08:59

## 2018-02-19 RX ADMIN — SODIUM CHLORIDE, POTASSIUM CHLORIDE, SODIUM LACTATE AND CALCIUM CHLORIDE: 600; 310; 30; 20 INJECTION, SOLUTION INTRAVENOUS at 08:31

## 2018-02-19 RX ADMIN — ONDANSETRON 4 MG: 2 INJECTION INTRAMUSCULAR; INTRAVENOUS at 08:56

## 2018-02-19 RX ADMIN — FENTANYL CITRATE 50 MCG: 50 INJECTION, SOLUTION INTRAMUSCULAR; INTRAVENOUS at 09:04

## 2018-02-19 NOTE — DISCHARGE INSTRUCTIONS
Same-Day Surgery   Adult Discharge Orders & Instructions     For 24 hours after surgery:  1. Get plenty of rest.  A responsible adult must stay with you for at least 24 hours after you leave the hospital.   2. Pain medication can slow your reflexes. Do not drive or use heavy equipment.  If you have weakness or tingling, don't drive or use heavy equipment until this feeling goes away.  3. Mixing alcohol and pain medication can cause dizziness and slow your breathing. It can even be fatal. Do not drink alcohol while taking pain medication.  4. Avoid strenuous or risky activities.  Ask for help when climbing stairs.   5. You may feel lightheaded.  If so, sit for a few minutes before standing.  Have someone help you get up.   6. If you have nausea (feel sick to your stomach), drink only clear liquids such as apple juice, ginger ale, broth or 7-Up.  Rest may also help.  Be sure to drink enough fluids.  Move to a regular diet as you feel able. Take pain medications with a small amount of solid food, such as toast or crackers, to avoid nausea.   7. A slight fever is normal. Call the doctor if your fever is over 100 F (37.7 C) (taken under the tongue) or lasts longer than 24 hours.  8. You may have a dry mouth, muscle aches, trouble sleeping or a sore throat.  These symptoms should go away after 24 hours.  9. Do not make important or legal decisions.   Pain Management:      1. Take pain medication (if prescribed) for pain as directed by your physician.        2. WARNING: If the pain medication you have been prescribed contains Tylenol  (acetaminophen), DO NOT take additional doses of Tylenol (acetaminophen).     Call your doctor for any of the followin.  Signs of infection (fever, growing tenderness at the surgery site, severe pain, a large amount of drainage or bleeding, foul-smelling drainage, redness, swelling).    2.  It has been over 8 to 10 hours since surgery and you are still not able to urinate (pee).    3.   Headache for over 24 hours.    4.  Numbness, tingling or weakness the day after surgery (if you had spinal anesthesia).  To contact a doctor, call _____________________________________ or:      [phone] and ask for the Resident On Call for:          __________________________________________ (answered 24 hours a day)      Emergency Department:  [facility]: [phone]  [facility]: [phone]               Rev. 10/2014

## 2018-02-19 NOTE — OP NOTE
OPERATIVE REPORT  February 19, 2018      PREOPERATIVE DIAGNOSIS:  Gross hematuria, Papillary bladder tumor    POSTOPERATIVE DIAGNOSIS: Same, no papillary bladder tumor    PROCEDURES PERFORMED:   1. Cystourethroscopy  2. Targeted bladder biopsies  3. Fulguration of biopsied areas    STAFF SURGEON: Dr. Kenna La MD, available for entire case.     RESIDENT(S):  Laney Mota MD    ANESTHESIA: General    ESTIMATED BLOOD LOSS: < 10 mL.     DRAINS: None    OPERATIVE INDICATIONS:   Dimple Oviedo is a(n) 84 year old female with a history of gross hematuria; workup noted positive FISH (but negative cytology) and a small papillary bladder tumor in the trigone on office flexible cystoscopy; The patient elected for biopsy and maximal fulguration, after a discussion of all risks, benefits, and alternatives.    DESCRIPTION OF PROCEDURE:   After verification of informed consent was obtained, the patient was brought to the operating room, and moved to the operating table. After adequate anesthesia was induced, the patient was repositioned in dorsal lithotomy position with care in neural safety in positioning of all four extremities.  She was prepped and draped in the usual sterile fashion. A formal timeout was performed to confirm the correct patient, procedure and operative site.     A 22-[place] rigid cystoscope was inserted into a well lubricated urethra. We began by performing a white light cystourethroscopy. The urethra was noted to be friable but without gross suspicious areas. The ureteral orifices were in their orthotopic positions bilaterally with good efflux bilaterally. There were no intravesical stones or diverticuli and the bladder was mildly trabeculated.     We carefully examined the urothelium and we did not identify any gross tumors on rigid cystoscopy nor with flexible cystoscopy. We did note two small areas of urothelium that appeared somewhat abnormally irregular at the trigone and posterior wall that were  biopsied    We used a cold-cup flexible biopsy forceps to biopsy these areas and passed these off for pathology. A Bugbee was used to fulgurate the biopsied sites and any bleeding areas. The bladder was re-examined under low pressure and hemostasis was confirmed. At this point, the bladder was drained and the cystoscope withdrawn.    The procedure was then concluded. The patient was transferred to the postanesthesia care unit in stable condition and tolerated the procedure well.    POSTOP PLAN:  1. D/c home today with appropriate pain medication  2. Follow-up in clinic with Dr. La for review of pathology    Addendum:    I, Kenna La, was present and scrubbed for the entire case.  I agree with the note as above with changes made as needed.    I spoke to the patient's son and daughter in law in the waiting room after the case    She has postoperative appointment to discuss results with me in about 2 weeks    Kenna La MD MPH   of Urology

## 2018-02-19 NOTE — ANESTHESIA CARE TRANSFER NOTE
Patient: Dimple Oviedo    Procedure(s):  Cystoscopy, Bladder Biopsy - Wound Class: II-Clean Contaminated    Diagnosis: Bladder Tumor  Diagnosis Additional Information: No value filed.    Anesthesia Type:   General, LMA     Note:  Airway :Face Mask  Patient transferred to:PACU  Comments: Strong SV, VSS. Report to RN.Handoff Report: Identifed the Patient, Identified the Reponsible Provider, Reviewed the pertinent medical history, Discussed the surgical course, Reviewed Intra-OP anesthesia mangement and issues during anesthesia, Set expectations for post-procedure period and Allowed opportunity for questions and acknowledgement of understanding      Vitals: (Last set prior to Anesthesia Care Transfer)    CRNA VITALS  2/19/2018 0910 - 2/19/2018 0946      2/19/2018             Pulse: 75    SpO2: 100 %                Electronically Signed By: NELSON Batista CRNA  February 19, 2018  9:46 AM

## 2018-02-19 NOTE — BRIEF OP NOTE
[facility] [place]    Brief Operative Note    Pre-operative diagnosis: Bladder Tumor  Post-operative diagnosis * No post-op diagnosis entered *  Procedure: Procedure(s):  Cystoscopy, Bladder Biopsy - Wound Class: II-Clean Contaminated  Surgeon: Surgeon(s) and Role:     * Kenna La MD - Primary     * Laney Moat MD - Resident - Assisting  Anesthesia: General   Estimated blood loss: 7 cc  Drains: None  Specimens:   ID Type Source Tests Collected by Time Destination   A : Posterior wall Biopsy Bladder SURGICAL PATHOLOGY EXAM Kenna La MD 2/19/2018  9:16 AM    B : Trigone Biopsy Bladder SURGICAL PATHOLOGY EXAM Kenna La MD 2/19/2018  9:18 AM      Findings:   No gross papillary tumors noted on rigid and flexible cystoscopy. Two small areas of urothelium were noted to be suspicious for malignancy in the posterior wall and trigone and were thus biopsied then the biopsy sites were fulgurated for appropriate hemostasis.  Complications: None.  Implants: None.

## 2018-02-19 NOTE — ANESTHESIA POSTPROCEDURE EVALUATION
Patient: Dimple Oviedo    Procedure(s):  Cystoscopy, Bladder Biopsy - Wound Class: II-Clean Contaminated    Diagnosis:Bladder Tumor  Diagnosis Additional Information: No value filed.    Anesthesia Type:  General, LMA    Note:  Anesthesia Post Evaluation    Patient location during evaluation: PACU  Patient participation: Able to fully participate in evaluation  Level of consciousness: awake and alert  Pain management: adequate  Airway patency: patent  Cardiovascular status: hemodynamically stable  Respiratory status: nonlabored ventilation, spontaneous ventilation and room air  Hydration status: euvolemic  PONV: none     Anesthetic complications: None          Last vitals:  Vitals:    02/19/18 0943 02/19/18 1000 02/19/18 1015   BP: 157/66 164/68 161/68   Resp: 13 13 15   Temp: 36.5  C (97.7  F)  36.6  C (97.8  F)   SpO2: 100% 100% 100%         Electronically Signed By: Justin Stephenson MD  February 19, 2018  11:05 AM

## 2018-02-19 NOTE — IP AVS SNAPSHOT
MRN:[id number]                      After Visit Summary   2/19/2018    Dimple Oviedo    MRN: [id number]           Thank you!     Thank you for choosing [facility] for your care. Our goal is always to provide you with excellent care. Hearing back from our patients is one way we can continue to improve our services. Please take a few minutes to complete the written survey that you may receive in the mail after you visit with us. Thank you!        Patient Information     Date Of Birth          6/23/1933        About your hospital stay     You were admitted on:  February 19, 2018 You last received care in the:  [facility] PACU    You were discharged on:  February 19, 2018       Who to Call     For medical emergencies, please call 911.  For non-urgent questions about your medical care, please call your primary care provider or clinic, [phone]  For questions related to your surgery, please call your surgery clinic        Attending Provider     Provider Kenna Phillips MD Urology       Primary Care Provider Office Phone # Fax #    Emiifdk Antonino Zapien [phone]      After Care Instructions     Discharge Instructions       What to expect post-operatively  - Some hematuria (pink urine) with a few clots is expected for about 1 week from your procedure. Consider calling the numbers below if this persists past 1 week or if the hematuria progresses to significant blood and or clots.   - Some dysuria (pain with urination) and bladder discomfort/spasms are also expected post-operatively    Activity  - Do not strain with bowel movements.  - Do not drive until you can press the brake pedal quickly and fully without pain.   - Do not operate a motor vehicle while taking narcotic pain medications. .    Medications    - Transition from narcotic pain medications to tylenol (acetaminophen) as you are able.  Wean yourself off all pain medications as you are able.  - Some pain  "medications contain both tylenol (acetaminophen) and a narcotic (Norco, vicodin, percocet), do not take more than 4,000mg of Tylenol (acetaminophen) from all sources in any 24 hour period.  - Narcotics can make you constipated.  Take over the counter fiber (metamucil or benefiber) and stool softeners (miralax, docusate or senna) while taking narcotic pain medications, but stop if you develop diarrhea.  - No driving or operating machinery while taking narcotic pain medications     Follow-Up:  - Follow up with Dr. La as scheduled on 3/8/2018    - Call your primary care provider to touch base regarding your recent procedure.    - Call or return sooner than your regularly scheduled visit if you develop any of the following: fever (greater than 101.5), uncontrolled pain, uncontrolled nausea or vomiting, as well as increased redness, swelling, or drainage from your wound.     Phone numbers:   - Monday through Friday 8am to 4:30pm: Call [phone] with questions or to schedule or confirm appointment.    - Nights or weekends: call the after hours emergency pager - [phone] and tell the  \"I would like to page the Urology Resident on call.\"  - For emergencies, call [phone]                  Your next 10 appointments already scheduled     Feb 26, 2018 11:30 AM CST   Lab with  LAB   [facility] ([facility])    [address]  1st Floor  [facility] [id number] [phone]            Feb 26, 2018 12:00 PM CST   (Arrive by 11:45 AM)   CORE RETURN with [facility] ([facility])    [address]  [address]  [facility] [id number] [phone]            Mar 08, 2018  9:15 AM CST   (Arrive by 9:00 AM)   Post-Op with Kenna La MD   [facility] ([facility])    [address] [place] [address]  4th Floor  [facility] [id number] [phone] "            May 29, 2018 10:00 AM CDT   (Arrive by 9:45 AM)   RETURN ENDOCRINE with Dhara Meza MD   [facility] ([facility])    [address] [address] [phone]              Further instructions from your care team       Same-Day Surgery   Adult Discharge Orders & Instructions     For 24 hours after surgery:  1. Get plenty of rest.  A responsible adult must stay with you for at least 24 hours after you leave the hospital.   2. Pain medication can slow your reflexes. Do not drive or use heavy equipment.  If you have weakness or tingling, don't drive or use heavy equipment until this feeling goes away.  3. Mixing alcohol and pain medication can cause dizziness and slow your breathing. It can even be fatal. Do not drink alcohol while taking pain medication.  4. Avoid strenuous or risky activities.  Ask for help when climbing stairs.   5. You may feel lightheaded.  If so, sit for a few minutes before standing.  Have someone help you get up.   6. If you have nausea (feel sick to your stomach), drink only clear liquids such as apple juice, ginger ale, broth or 7-Up.  Rest may also help.  Be sure to drink enough fluids.  Move to a regular diet as you feel able. Take pain medications with a small amount of solid food, such as toast or crackers, to avoid nausea.   7. A slight fever is normal. Call the doctor if your fever is over 100 F (37.7 C) (taken under the tongue) or lasts longer than 24 hours.  8. You may have a dry mouth, muscle aches, trouble sleeping or a sore throat.  These symptoms should go away after 24 hours.  9. Do not make important or legal decisions.   Pain Management:      1. Take pain medication (if prescribed) for pain as directed by your physician.        2. WARNING: If the pain medication you have been prescribed contains Tylenol  (acetaminophen), DO NOT take additional doses of Tylenol (acetaminophen).     Call your  "doctor for any of the followin.  Signs of infection (fever, growing tenderness at the surgery site, severe pain, a large amount of drainage or bleeding, foul-smelling drainage, redness, swelling).    2.  It has been over 8 to 10 hours since surgery and you are still not able to urinate (pee).    3.  Headache for over 24 hours.    4.  Numbness, tingling or weakness the day after surgery (if you had spinal anesthesia).  To contact a doctor, call _____________________________________ or:      [phone] and ask for the Resident On Call for:          __________________________________________ (answered 24 hours a day)      Emergency Department:  [place] [facility]: [phone]  [place] [facility]: [phone]               Rev. 10/2014       Pending Results     No orders found from 2018 to 2018.            Admission Information     Date & Time Provider Department Dept. Phone    2018 Kenna La MD [facility] PACU [phone]      Your Vitals Were     Blood Pressure Temperature Respirations Height Weight Pulse Oximetry    157/66 97.7  F (36.5  C) (Axillary) 13 1.461 m (4' 9.5\") 69.4 kg (153 lb) 100%    BMI (Body Mass Index)                   32.54 kg/m2           MyChart Information     [employer] gives you secure access to your electronic health record. If you see a primary care provider, you can also send messages to your care team and make appointments. If you have questions, please call your primary care clinic.  If you do not have a primary care provider, please call [phone] and they will assist you.        Care EveryWhere ID     This is your Care EveryWhere ID. This could be used by other organizations to access your [place] medical records  [id number]        Equal Access to Services     GUNNER RODRIGUEZ AH: Hadii aad ku hadasho Soomaali, wahenrique caba waxay idiin hayaan adeeg kharash la'aan . [facility] [phone].    ATENCIÓN: Si " "soni alanis, tiene a sierra disposición servicios gratuitos de asistencia lingüística. Al leon [phone].    We comply with applicable federal civil rights laws and [place] laws. We do not discriminate on the basis of race, color, national origin, age, disability, sex, sexual orientation, or gender identity.               Review of your medicines      START taking        Dose / Directions    acetaminophen-codeine 300-30 MG per tablet   Commonly known as:  TYLENOL #3   Used for:  Other microscopic hematuria        Dose:  1 tablet   Take 1 tablet by mouth every 6 hours as needed for pain maximum 4 tablet(s) per day   Quantity:  8 tablet   Refills:  0         CONTINUE these medicines which have NOT CHANGED        Dose / Directions    acetaminophen 650 MG 8 hour tablet   Used for:  Ascending cholangitis        Dose:  650 mg   Take 650 mg by mouth every 8 hours as needed for mild pain or fever   Quantity:  100 tablet   Refills:  0       alendronate 70 MG tablet   Commonly known as:  FOSAMAX   Used for:  High risk medication use, Osteopenia        TAKE 1 TABLET EVERY 7 DAYS AT LEAST 60 MIN BEFORE BREAKFAST AS DIRECTED \"SEE PACKAGE FOR ADDITIONAL INSTRUCTIONS\"   Quantity:  12 tablet   Refills:  2       ASPIRIN PO        Dose:  81 mg   Take 81 mg by mouth At Bedtime   Refills:  0       Calcium carb-Vitamin D 500 mg [place]-200 units 500-200 MG-UNIT per tablet   Commonly known as:  OSCAL with D;Oyster Shell Calcium        Dose:  1 tablet   Take 1 tablet by mouth 2 times daily (with meals)   Refills:  0       CRANBERRY CONCENTRATE PO        Dose:  1 capsule   Take 1 capsule by mouth 2 times daily   Refills:  0       cycloSPORINE 0.05 % ophthalmic emulsion   Commonly known as:  RESTASIS        Dose:  1 drop   Place 1 drop into both eyes 2 times daily   Refills:  0       diazepam 2 MG tablet   Commonly known as:  VALIUM   Used for:  THERON (generalized anxiety disorder)        Dose:  2 mg   Take 1 tablet (2 mg) by mouth every 12 " hours as needed for anxiety or sleep   Quantity:  20 tablet   Refills:  0       ferrous sulfate 325 (65 FE) MG tablet   Commonly known as:  IRON   Used for:  Iron deficiency anemia, unspecified iron deficiency anemia type        Dose:  325 mg   Take 1 tablet (325 mg) by mouth daily (with breakfast)   Refills:  0       Fish Oil 500 MG Caps        Dose:  1 capsule   Take 1 capsule by mouth 2 times daily   Refills:  0       furosemide 20 MG tablet   Commonly known as:  LASIX   Used for:  Elevated troponin        Dose:  20 mg   Take 1 tablet (20 mg) by mouth daily   Quantity:  30 tablet   Refills:  3       irbesartan 300 MG tablet   Commonly known as:  AVAPRO   Used for:  Essential hypertension with goal blood pressure less than 140/90        TAKE 1 TABLET EVERY DAY   Quantity:  90 tablet   Refills:  0       lovastatin 40 MG tablet   Commonly known as:  MEVACOR   Used for:  Hyperlipidemia LDL goal <130        TAKE 1 TABLET AT BEDTIME (HYPERLIPIDEMIA LDL GOAL  BELOW 130)   Quantity:  90 tablet   Refills:  2       melatonin 3 MG tablet   Used for:  Insomnia        Dose:  3 mg   Take 1 tablet (3 mg) by mouth nightly as needed for sleep   Quantity:  90 tablet   Refills:  0       methimazole 5 MG tablet   Commonly known as:  TAPAZOLE   Used for:  Nontoxic multinodular goiter        Dose:  5 mg   Take 1 tablet (5 mg) by mouth 3 times daily   Quantity:  270 tablet   Refills:  0       nitroGLYcerin 0.4 MG sublingual tablet   Commonly known as:  NITROSTAT   Used for:  Elevated troponin        For chest pain place 1 tablet under the tongue every 5 minutes for 3 doses. If symptoms persist 5 minutes after 1st dose call 911.   Quantity:  25 tablet   Refills:  3       omeprazole 20 MG CR capsule   Commonly known as:  priLOSEC   Used for:  Gastroesophageal reflux disease without esophagitis        Dose:  20 mg   Take 1 capsule (20 mg) by mouth daily   Quantity:  180 capsule   Refills:  3       polyethylene glycol Packet   Commonly  known as:  MIRALAX/GLYCOLAX   Used for:  Other constipation        Dose:  17 g   Take 17 g by mouth 2 times daily as needed for constipation   Quantity:  7 packet   Refills:  0       propranolol 60 MG 24 hr capsule   Commonly known as:  INDERAL LA   Used for:  Nontoxic multinodular goiter        Dose:  60 mg   Take 1 capsule (60 mg) by mouth daily   Quantity:  90 capsule   Refills:  0       sertraline 50 MG tablet   Commonly known as:  ZOLOFT   Used for:  THERON (generalized anxiety disorder)        Dose:  50 mg   Take 1 tablet (50 mg) by mouth daily   Quantity:  90 tablet   Refills:  0       SYSTANE 0.4-0.3 % Soln ophthalmic solution   Generic drug:  polyethylene glycol 0.4%- propylene glycol 0.3%        Dose:  1 drop   Place 1 drop into both eyes 3 times daily as needed for dry eyes   Refills:  0            Where to get your medicines      Some of these will need a paper prescription and others can be bought over the counter. Ask your nurse if you have questions.     Bring a paper prescription for each of these medications     acetaminophen-codeine 300-30 MG per tablet                Protect others around you: Learn how to safely use, store and throw away your medicines at www.disposemymeds.org.        Information about OPIOIDS     PRESCRIPTION OPIOIDS: WHAT YOU NEED TO KNOW    Prescription opioids can be used to help relieve moderate to severe pain and are often prescribed following a surgery or injury, or for certain health conditions. These medications can be an important part of treatment but also come with serious risks. It is important to work with your health care provider to make sure you are getting the safest, most effective care.    WHAT ARE THE RISKS AND SIDE EFFECTS OF OPIOID USE?  Prescription opioids carry serious risks of addiction and overdose, especially with prolonged use. An opioid overdose, often marked by slowed breathing can cause sudden death. The use of prescription opioids can have a number  of side effects as well, even when taken as directed:      Tolerance - meaning you might need to take more of a medication for the same pain relief    Physical dependence - meaning you have symptoms of withdrawal when a medication is stopped    Increased sensitivity to pain    Constipation    Nausea, vomiting, and dry mouth    Sleepiness and dizziness    Confusion    Depression    Low levels of testosterone that can result in lower sex drive, energy, and strength    Itching and sweating    RISKS ARE GREATER WITH:    History of drug misuse, substance use disorder, or overdose    Mental health conditions (such as depression or anxiety)    Sleep apnea    Older age (65 years or older)    Pregnancy    Avoid alcohol while taking prescription opioids.   Also, unless specifically advised by your health care provider, medications to avoid include:    Benzodiazepines (such as Xanax or Valium)    Muscle relaxants (such as Soma or Flexeril)    Hypnotics (such as Ambien or Lunesta)    Other prescription opioids    KNOW YOUR OPTIONS:  Talk to your health care provider about ways to manage your pain that do not involve prescription opioids. Some of these options may actually work better and have fewer risks and side effects:    Pain relievers such as acetaminophen, ibuprofen, and naproxen    Some medications that are also used for depression or seizures    Physical therapy and exercise    Cognitive behavioral therapy, a psychological, goal-directed approach, in which patients learn how to modify physical, behavioral, and emotional triggers of pain and stress    IF YOU ARE PRESCRIBED OPIOIDS FOR PAIN:    Never take opioids in greater amounts or more often than prescribed    Follow up with your primary health care provider and work together to create a plan on how to manage your pain.    Talk about ways to help manage your pain that do not involve prescription opioids    Talk about all concerns and side effects    Help prevent  "misuse and abuse    Never sell or share prescription opioids    Never use another person's prescription opioids    Store prescription opioids in a secure place and out of reach of others (this may include visitors, children, friends, and family)    Visit www.cdc.gov/drugoverdose to learn about risks of opioid abuse and overdose    If you believe you may be struggling with addiction, tell your health care provider and ask for guidance or call [facility]'s National Helpline at 6-037-299-HELP    LEARN MORE / www.cdc.gov/drugoverdose/prescribing/guideline.html    Safely dispose of unused prescription opioids: Find your local drug take-back programs and more information about the importance of safe disposal at www.doseofreality.mn.gov             Medication List: This is a list of all your medications and when to take them. Check marks below indicate your daily home schedule. Keep this list as a reference.      Medications           Morning Afternoon Evening Bedtime As Needed    acetaminophen 650 MG 8 hour tablet   Take 650 mg by mouth every 8 hours as needed for mild pain or fever                                acetaminophen-codeine 300-30 MG per tablet   Commonly known as:  TYLENOL #3   Take 1 tablet by mouth every 6 hours as needed for pain maximum 4 tablet(s) per day                                alendronate 70 MG tablet   Commonly known as:  FOSAMAX   TAKE 1 TABLET EVERY 7 DAYS AT LEAST 60 MIN BEFORE BREAKFAST AS DIRECTED \"SEE PACKAGE FOR ADDITIONAL INSTRUCTIONS\"                                ASPIRIN PO   Take 81 mg by mouth At Bedtime                                Calcium carb-Vitamin D 500 mg [place]-200 units 500-200 MG-UNIT per tablet   Commonly known as:  OSCAL with D;Oyster Shell Calcium   Take 1 tablet by mouth 2 times daily (with meals)                                CRANBERRY CONCENTRATE PO   Take 1 capsule by mouth 2 times daily                                cycloSPORINE 0.05 % ophthalmic emulsion   Commonly " known as:  RESTASIS   Place 1 drop into both eyes 2 times daily                                diazepam 2 MG tablet   Commonly known as:  VALIUM   Take 1 tablet (2 mg) by mouth every 12 hours as needed for anxiety or sleep                                ferrous sulfate 325 (65 FE) MG tablet   Commonly known as:  IRON   Take 1 tablet (325 mg) by mouth daily (with breakfast)                                Fish Oil 500 MG Caps   Take 1 capsule by mouth 2 times daily                                furosemide 20 MG tablet   Commonly known as:  LASIX   Take 1 tablet (20 mg) by mouth daily                                irbesartan 300 MG tablet   Commonly known as:  AVAPRO   TAKE 1 TABLET EVERY DAY                                lovastatin 40 MG tablet   Commonly known as:  MEVACOR   TAKE 1 TABLET AT BEDTIME (HYPERLIPIDEMIA LDL GOAL  BELOW 130)                                melatonin 3 MG tablet   Take 1 tablet (3 mg) by mouth nightly as needed for sleep                                methimazole 5 MG tablet   Commonly known as:  TAPAZOLE   Take 1 tablet (5 mg) by mouth 3 times daily                                nitroGLYcerin 0.4 MG sublingual tablet   Commonly known as:  NITROSTAT   For chest pain place 1 tablet under the tongue every 5 minutes for 3 doses. If symptoms persist 5 minutes after 1st dose call 911.                                omeprazole 20 MG CR capsule   Commonly known as:  priLOSEC   Take 1 capsule (20 mg) by mouth daily                                polyethylene glycol Packet   Commonly known as:  MIRALAX/GLYCOLAX   Take 17 g by mouth 2 times daily as needed for constipation                                propranolol 60 MG 24 hr capsule   Commonly known as:  INDERAL LA   Take 1 capsule (60 mg) by mouth daily                                sertraline 50 MG tablet   Commonly known as:  ZOLOFT   Take 1 tablet (50 mg) by mouth daily                                SYSTANE 0.4-0.3 % Soln ophthalmic  solution   Place 1 drop into both eyes 3 times daily as needed for dry eyes   Generic drug:  polyethylene glycol 0.4%- propylene glycol 0.3%

## 2018-02-19 NOTE — IP AVS SNAPSHOT
[place] [id number] [facility] [address]    Phone:  [phone]                                       After Visit Summary   2/19/2018    Dimple Oviedo    MRN: [id number]           After Visit Summary Signature Page     I have received my discharge instructions, and my questions have been answered. I have discussed any challenges I see with this plan with the nurse or doctor.    ..........................................................................................................................................  Patient/Patient Representative Signature      ..........................................................................................................................................  Patient Representative Print Name and Relationship to Patient    ..................................................               ................................................  Date                                            Time    ..........................................................................................................................................  Reviewed by Signature/Title    ...................................................              ..............................................  Date                                                            Time

## 2018-02-20 ENCOUNTER — CARE COORDINATION (OUTPATIENT)
Dept: CARDIOLOGY | Facility: CLINIC | Age: 83
End: 2018-02-20

## 2018-02-20 DIAGNOSIS — I50.22 CHRONIC SYSTOLIC HEART FAILURE (H): Primary | ICD-10-CM

## 2018-02-20 DIAGNOSIS — E05.90 HYPERTHYROIDISM: Primary | ICD-10-CM

## 2018-02-20 LAB — COPATH REPORT: NORMAL

## 2018-02-21 ENCOUNTER — CARE COORDINATION (OUTPATIENT)
Dept: UROLOGY | Facility: CLINIC | Age: 83
End: 2018-02-21

## 2018-02-21 ENCOUNTER — ALLIED HEALTH/NURSE VISIT (OUTPATIENT)
Dept: UROLOGY | Facility: CLINIC | Age: 83
End: 2018-02-21
Payer: MEDICARE

## 2018-02-21 DIAGNOSIS — N32.9 LESION OF BLADDER: Primary | ICD-10-CM

## 2018-02-21 RX ORDER — SERTRALINE HYDROCHLORIDE 25 MG/1
TABLET, FILM COATED ORAL
Refills: 0 | Status: ON HOLD | COMMUNITY
Start: 2018-01-26 | End: 2018-03-14

## 2018-02-21 NOTE — PROGRESS NOTES
Patient here is today   Patient was having frequency and wanted to come in to make sure she was emptying her bladder. Bladder scanned her bladder with 0   97.3 oral temp  Denies pain

## 2018-02-21 NOTE — NURSING NOTE
Dimple Oviedo comes into clinic today at the request of Dr. La Ordering Provider for bladder scan.    Patient presents to the Urology Department on the nurse schedule with concerns of possible urinary retention.  Patient states she really has to push to urinate and only a small amount comes out.  Patient is S/P bladder biopsy with Dr. La on 2-19-18.  Patient also reports that she has been extremely constipated since her surgical procedure.  Bladder scan today is zero.  Per Cristiana, RNCC, ok to try half dose of Milk of Magnesia, prunes and increased water intake for the constipation.  She will follow up with Dr. La on 3-8-18 for her post op.    This service provided today was under the supervising provider of the day Shavon Bustamante PA-C, who was available if needed.    JEAN CLAUDE Morales

## 2018-02-21 NOTE — MR AVS SNAPSHOT
After Visit Summary   2/21/2018    Dimple Oviedo    MRN: [id number]           Patient Information     Date Of Birth          6/23/1933        Visit Information        Provider Department      2/21/2018 11:00 AM Nurse, [facility] Prostate [facility] Urologic Cancers        Today's Diagnoses     Lesion of bladder    -  1       Follow-ups after your visit        Your next 10 appointments already scheduled     Feb 26, 2018 11:30 AM CST   Lab with  LAB   [facility] ([facility])    [address]  1st Floor  [facility] [id number] [phone]            Feb 26, 2018 12:00 PM CST   (Arrive by 11:45 AM)   CORE RETURN with [facility] ([facility])    [address] [place] [address]  [address]  [facility] [id number] [phone]            Mar 08, 2018  9:15 AM CST   (Arrive by 9:00 AM)   Post-Op with Knena La MD   [facility] ([facility])    [address] [place] [address]  4th Floor  [facility] [id number] [phone]            May 29, 2018 10:00 AM CDT   (Arrive by 9:45 AM)   RETURN ENDOCRINE with Dhara Meza MD   [facility] Endocrinology ([facility])    [address] [place] [address]  3rd Floor  [facility] [id number] [phone]              Future tests that were ordered for you today     Open Future Orders        Priority Expected Expires Ordered    TSH Routine 3/26/2018 4/20/2018 2/20/2018    T4 free Routine 3/26/2018 4/20/2018 2/20/2018    Basic metabolic panel Routine 2/26/2018 3/2/2018 2/20/2018            Who to contact     Please call your clinic at [phone] to:    Ask questions about your health    Make or cancel appointments    Discuss your medicines    Learn about your test results    Speak to your doctor            Additional Information About Your  Visit        [email or web address] Information     [email or web address] gives you secure access to your electronic health record. If you see a primary care provider, you can also send messages to your care team and make appointments. If you have questions, please call your primary care clinic.  If you do not have a primary care provider, please call [phone] and they will assist you.      [email or web address] is an electronic gateway that provides easy, online access to your medical records. With [email or web address], you can request a clinic appointment, read your test results, renew a prescription or communicate with your care team.     To access your existing account, please contact your [facility] or call [phone] for assistance.        Care EveryWhere ID     This is your Care EveryWhere ID. This could be used by other organizations to access your [facility] medical records  [id number]         Blood Pressure from Last 3 Encounters:   02/19/18 143/66   02/07/18 (!) 102/36   02/06/18 130/57    Weight from Last 3 Encounters:   02/19/18 69.4 kg (153 lb)   02/07/18 69.4 kg (153 lb)   02/06/18 70.3 kg (155 lb)              We Performed the Following     POST-VOID RESIDUAL BLADDER SCAN        Primary Care Provider Office Phone # Fax #    Pop Antonino Zapien [phone] [address] [place] [address]        Equal Access to Services     GUNNER RODRIGUEZ : Hadii shameka funko Sogabo, waaxda luqadaha, qaybta kaalmada saira, maldonado bey. So [facility] [phone].    ATENCIÓN: Si habla español, tiene a sierra disposición servicios gratuitos de asistencia lingüística. Al al [phone].    We comply with applicable federal civil rights laws and [place] laws. We do not discriminate on the basis of race, color, national origin, age, disability, sex, sexual orientation, or gender identity.            Thank you!     Thank you for choosing [facility] UROLOGY AND [facility] FOR PROSTATE  "AND UROLOGIC CANCERS  for your care. Our goal is always to provide you with excellent care. Hearing back from our patients is one way we can continue to improve our services. Please take a few minutes to complete the written survey that you may receive in the mail after your visit with us. Thank you!             Your Updated Medication List - Protect others around you: Learn how to safely use, store and throw away your medicines at www.disposemymeds.org.          This list is accurate as of 2/21/18 12:36 PM.  Always use your most recent med list.                   Brand Name Dispense Instructions for use Diagnosis    acetaminophen 650 MG 8 hour tablet     100 tablet    Take 650 mg by mouth every 8 hours as needed for mild pain or fever    Ascending cholangitis       acetaminophen-codeine 300-30 MG per tablet    TYLENOL #3    8 tablet    Take 1 tablet by mouth every 6 hours as needed for pain maximum 4 tablet(s) per day    Other microscopic hematuria       alendronate 70 MG tablet    FOSAMAX    12 tablet    TAKE 1 TABLET EVERY 7 DAYS AT LEAST 60 MIN BEFORE BREAKFAST AS DIRECTED \"SEE PACKAGE FOR ADDITIONAL INSTRUCTIONS\"    High risk medication use, Osteopenia       ASPIRIN PO      Take 81 mg by mouth At Bedtime        Calcium carb-Vitamin D 500 mg [place]-200 units 500-200 MG-UNIT per tablet    OSCAL with D;Oyster Shell Calcium     Take 1 tablet by mouth 2 times daily (with meals)        CRANBERRY CONCENTRATE PO      Take 1 capsule by mouth 2 times daily        cycloSPORINE 0.05 % ophthalmic emulsion    RESTASIS     Place 1 drop into both eyes 2 times daily        diazepam 2 MG tablet    VALIUM    20 tablet    Take 1 tablet (2 mg) by mouth every 12 hours as needed for anxiety or sleep    THERON (generalized anxiety disorder)       ferrous sulfate 325 (65 FE) MG tablet    IRON     Take 1 tablet (325 mg) by mouth daily (with breakfast)    Iron deficiency anemia, unspecified iron deficiency anemia type       Fish Oil 500 MG " Caps      Take 1 capsule by mouth 2 times daily        furosemide 20 MG tablet    LASIX    30 tablet    Take 1 tablet (20 mg) by mouth daily    Elevated troponin       irbesartan 300 MG tablet    AVAPRO    90 tablet    TAKE 1 TABLET EVERY DAY    Essential hypertension with goal blood pressure less than 140/90       lovastatin 40 MG tablet    MEVACOR    90 tablet    TAKE 1 TABLET AT BEDTIME (HYPERLIPIDEMIA LDL GOAL  BELOW 130)    Hyperlipidemia LDL goal <130       melatonin 3 MG tablet     90 tablet    Take 1 tablet (3 mg) by mouth nightly as needed for sleep    Insomnia       methimazole 5 MG tablet    TAPAZOLE    270 tablet    Take 1 tablet (5 mg) by mouth 3 times daily    Nontoxic multinodular goiter       nitroGLYcerin 0.4 MG sublingual tablet    NITROSTAT    25 tablet    For chest pain place 1 tablet under the tongue every 5 minutes for 3 doses. If symptoms persist 5 minutes after 1st dose call 911.    Elevated troponin       omeprazole 20 MG CR capsule    priLOSEC    180 capsule    Take 1 capsule (20 mg) by mouth daily    Gastroesophageal reflux disease without esophagitis       polyethylene glycol Packet    MIRALAX/GLYCOLAX    7 packet    Take 17 g by mouth 2 times daily as needed for constipation    Other constipation       propranolol 60 MG 24 hr capsule    INDERAL LA    90 capsule    Take 1 capsule (60 mg) by mouth daily    Nontoxic multinodular goiter       * sertraline 25 MG tablet    ZOLOFT          * sertraline 50 MG tablet    ZOLOFT    90 tablet    Take 1 tablet (50 mg) by mouth daily    THERON (generalized anxiety disorder)       SYSTANE 0.4-0.3 % Soln ophthalmic solution   Generic drug:  polyethylene glycol 0.4%- propylene glycol 0.3%      Place 1 drop into both eyes 3 times daily as needed for dry eyes        * Notice:  This list has 2 medication(s) that are the same as other medications prescribed for you. Read the directions carefully, and ask your doctor or other care provider to review them with  you.

## 2018-02-22 ENCOUNTER — TELEPHONE (OUTPATIENT)
Dept: FAMILY MEDICINE | Facility: CLINIC | Age: 83
End: 2018-02-22

## 2018-02-22 DIAGNOSIS — R25.1 TREMOR OF BOTH HANDS: Primary | ICD-10-CM

## 2018-02-22 NOTE — TELEPHONE ENCOUNTER
"Writer reviewed 2/6/18 and 2/7/18 office visit notes and does not find notation of neurology follow up.    Consent to communicate with Day Oviedo on file.    Writer called Day who stated:  1. Patient's hands shake and she is nervous all the time-which is why neurology referral is requested   A. Patient shakes \"on the inside\"  2. Endocrinology says her thyroid is under control  3. Patient has been on increased Zoloft dose for 3 weeks   A. Patient cries easily and is depressed   B. Patient says she does not want to increase medication   C. Should Zoloft dose be increased?      Writer reviewed with Day when dose adjustment is made to medications such as Zoloft, need at least 4-6 weeks to be able to evaluated full therapeutic effect of medication.    Day aware Dr. Zapien not in clinic today but will be in clinic tomorrow.    Neurology referral pended.    Dr. Zapien-Please review and advise/sign if agree.    Thank you!  ITALO Gill, BSN, RN      "

## 2018-02-22 NOTE — TELEPHONE ENCOUNTER
Reason for Call: Request for an order or referral:    Order or referral being requested: referral    Date needed: as soon as possible    Has the patient been seen by the PCP for this problem? YES    Additional comments: is asking for a referral for to see neurologist over at the clinic and surgery center location building    Phone number Patient can be reached at:  Other phone number:  [phone]    Best Time:      Can we leave a detailed message on this number?  YES    Call taken on 2/22/2018 at 2:58 PM by Krysta Myers

## 2018-02-23 ENCOUNTER — CARE COORDINATION (OUTPATIENT)
Dept: UROLOGY | Facility: CLINIC | Age: 83
End: 2018-02-23

## 2018-02-23 NOTE — TELEPHONE ENCOUNTER
Signed neuro referral.  Agree with holding off on dose change on zoloft for now specially for her age

## 2018-02-26 ENCOUNTER — OFFICE VISIT (OUTPATIENT)
Dept: CARDIOLOGY | Facility: CLINIC | Age: 83
End: 2018-02-26
Attending: NURSE PRACTITIONER
Payer: MEDICARE

## 2018-02-26 VITALS
HEIGHT: 57 IN | WEIGHT: 151 LBS | BODY MASS INDEX: 32.58 KG/M2 | HEART RATE: 56 BPM | DIASTOLIC BLOOD PRESSURE: 55 MMHG | OXYGEN SATURATION: 97 % | SYSTOLIC BLOOD PRESSURE: 124 MMHG

## 2018-02-26 DIAGNOSIS — I49.9 IRREGULAR HEART RATE: ICD-10-CM

## 2018-02-26 DIAGNOSIS — R25.1 TREMOR: ICD-10-CM

## 2018-02-26 DIAGNOSIS — I50.22 CHRONIC SYSTOLIC HEART FAILURE (H): ICD-10-CM

## 2018-02-26 DIAGNOSIS — R79.89 ELEVATED TROPONIN: ICD-10-CM

## 2018-02-26 DIAGNOSIS — I10 HYPERTENSION GOAL BP (BLOOD PRESSURE) < 140/90: ICD-10-CM

## 2018-02-26 DIAGNOSIS — I50.22 CHRONIC SYSTOLIC HEART FAILURE (H): Primary | ICD-10-CM

## 2018-02-26 DIAGNOSIS — E05.90 HYPERTHYROIDISM: ICD-10-CM

## 2018-02-26 LAB
ANION GAP SERPL CALCULATED.3IONS-SCNC: 8 MMOL/L (ref 3–14)
BUN SERPL-MCNC: 15 MG/DL (ref 7–30)
CALCIUM SERPL-MCNC: 9.2 MG/DL (ref 8.5–10.1)
CHLORIDE SERPL-SCNC: 100 MMOL/L (ref 94–109)
CO2 SERPL-SCNC: 27 MMOL/L (ref 20–32)
CREAT SERPL-MCNC: 0.8 MG/DL (ref 0.52–1.04)
GFR SERPL CREATININE-BSD FRML MDRD: 68 ML/MIN/1.7M2
GLUCOSE SERPL-MCNC: 137 MG/DL (ref 70–99)
NT-PROBNP SERPL-MCNC: 461 PG/ML (ref 0–450)
POTASSIUM SERPL-SCNC: 4.2 MMOL/L (ref 3.4–5.3)
SODIUM SERPL-SCNC: 135 MMOL/L (ref 133–144)

## 2018-02-26 PROCEDURE — 36415 COLL VENOUS BLD VENIPUNCTURE: CPT | Performed by: NURSE PRACTITIONER

## 2018-02-26 PROCEDURE — 83880 ASSAY OF NATRIURETIC PEPTIDE: CPT | Performed by: NURSE PRACTITIONER

## 2018-02-26 PROCEDURE — 93010 ELECTROCARDIOGRAM REPORT: CPT | Mod: ZP | Performed by: INTERNAL MEDICINE

## 2018-02-26 PROCEDURE — G0463 HOSPITAL OUTPT CLINIC VISIT: HCPCS | Mod: ZF

## 2018-02-26 PROCEDURE — 80048 BASIC METABOLIC PNL TOTAL CA: CPT | Performed by: NURSE PRACTITIONER

## 2018-02-26 PROCEDURE — 99215 OFFICE O/P EST HI 40 MIN: CPT | Mod: ZP | Performed by: NURSE PRACTITIONER

## 2018-02-26 RX ORDER — LIDOCAINE 40 MG/G
CREAM TOPICAL
Status: CANCELLED | OUTPATIENT
Start: 2018-02-26

## 2018-02-26 RX ORDER — SODIUM CHLORIDE 9 MG/ML
INJECTION, SOLUTION INTRAVENOUS CONTINUOUS
Status: CANCELLED | OUTPATIENT
Start: 2018-02-26

## 2018-02-26 RX ORDER — FUROSEMIDE 20 MG
10 TABLET ORAL DAILY
Qty: 30 TABLET | Refills: 3 | Status: ON HOLD | OUTPATIENT
Start: 2018-02-26 | End: 2018-03-15

## 2018-02-26 ASSESSMENT — PAIN SCALES - GENERAL: PAINLEVEL: NO PAIN (0)

## 2018-02-26 NOTE — PATIENT INSTRUCTIONS
You were seen today in the Cardiovascular Clinic at the [facility].     Cardiology Providers you saw during your visit: Elizabeth DAMON, KIKA      1. Decrease lasix to 10 mg daily - continue to weigh yourself, if you gain 5 lb over a few days or have more swelling or shortness of breath, call us at [phone] (ATUL [place])    2. I will send a message to the urologist re: blood thinner, can you also plan to ask her when you see her on ? We will be in touch whether or not to start a blood thinner     3. We will then plan to discuss whether or not to start you on a blood thinner    4. We will have you see Dr. Griffith in clinic in a few months to follow-up     5. We will see you in CORE clinic in May     Results for QUIN VALDEZ (MRN [id number]) as of 2018 12:10   Ref. Range 2018 11:30   Sodium Latest Ref Range: 133 - 144 mmol/L 135   Potassium Latest Ref Range: 3.4 - 5.3 mmol/L 4.2   Chloride Latest Ref Range: 94 - 109 mmol/L 100   Carbon Dioxide Latest Ref Range: 20 - 32 mmol/L 27   Urea Nitrogen Latest Ref Range: 7 - 30 mg/dL 15   Creatinine Latest Ref Range: 0.52 - 1.04 mg/dL 0.80   GFR Estimate Latest Ref Range: >60 mL/min/1.7m2 68   GFR Estimate If Black Latest Ref Range: >60 mL/min/1.7m2 82   Calcium Latest Ref Range: 8.5 - 10.1 mg/dL 9.2   Anion Gap Latest Ref Range: 3 - 14 mmol/L 8   Glucose Latest Ref Range: 70 - 99 mg/dL 137 (H)            Please limit your fluid intake to 2 L (64 ounces) daily.  2 Liters a day = 8.5 cups, or 72 ounces.  Please limit your salt intake to 2 grams a day or less.    If you gain 2# in 24 hours or 5# in one week call Kayli Olsen RN so we can adjust your medications as needed over the phone.    Please feel free to call me with any questions or concerns.      Kayli Olsen RN BSN   [facility]  Cardiology Care Coordinator-Heart Failure Clinic    Questions and [phone].   First press #1 for the University and then  "press #3 for \"Medical Questions\" to reach us Cardiology Nurses.     On Call Cardiologist for after hours or on weekends: [phone]   option #4 and ask to speak to the on-call Cardiologist. Inform them you are a CORE/heart failure patient at the [facility].        If you need a medication refill please contact your pharmacy.  Please allow 3 business days for your refill to be completed.  _______________________________________________________  [facility] Cardiomyopathy, Optimization, Rehabilitation, Education   The [facility] is a heart failure specialty clinic within the [facility] where you will work with specialized nurse practitioners dedicated to helping patients with heart failure carefully adjust medications, receive education, and learn who and when to call if symptoms develop. They specialize in helping you better understand your condition, slow the progression of your disease, improve the length and quality of your life, help you detect future heart problems before they become life threatening, and avoid hospitalizations.  As always, thank you for trusting us with your health care needs!        "

## 2018-02-26 NOTE — NURSING NOTE
Chief Complaint   Patient presents with     Follow Up For     3 week return Core, labs prior     Vitals were taken and medications were reconciled.     Lela Rios,JEAN CLAUDE  11:54 AM

## 2018-02-26 NOTE — LETTER
2/26/2018      RE: Dimple Oviedo  [address]   [facility] [id number]       Dear Colleague,    Thank you for the opportunity to participate in the care of your patient, Dimple Oviedo, at the [facility]. Please see a copy of my visit note below.    HPI:   Ms. Oviedo is a 84 year old female with a past medical history including HTN, multinodular goiter, GERD, hyperthyroidism, stress cardiomyopathy (dx 12/2017) now with recovered LV systolic function, history of atrial fibrillation with RVR. Presents to clinic for CORE follow-up.    She initially presented to [facility] 12/13 with persistent chest pain and shortness of breath, and was found to have an elevated troponin.  EKG was negative for ischemic changes.  Chest CT was negative for PE, but showed bilateral pleural effusions. Echocardiogram showed normal LV size with mild concentric hypertrophy, LVEF 30-35%, and global akinesis with sparing of the basal segments consistent with stress cardiomyopathy vs multivessel CAD. Coronary angiogram revealed no obstructive CAD.  Findings all suggested stress-induced cardiomyopathy, so she was discharged on BB, ARB, statin, and aspirin. She was then admitted overnight on 1/16 for palpitations and was found to be in afib with RVR in the setting of volume overload and ongoing hyperthyroidism (though free t4 was normal that admission). She converted to sinus spontaneously. She was given a one time dose of xarelto and developed significant hematuria so this was d/c'ed. She is now s/p bladder mass bx which were negative for malignancy. An echo during that admission showed a normalized EF.     Since last CORE visit, patient reports feeling ok. She is feeling anxious and reports a new tremor. This has been discussed with PCP and endocrine - not felt to be 2/2 thyroid. It was recommended she see neurology but she has not. From a cardiac standpoint, she is feeling  well. Denies LE edema, orthopnea, pnd, shortness of breath, abdominal bloating. Appetite is fair. She does endorse a dry mouth. No further bleeding issues, she is taking a baby ASA. No chest pain, palpitations, or presyncope.       PAST MEDICAL HISTORY:  Past Medical History:   Diagnosis Date     Calculus of kidney      Esophageal reflux      GERD (gastroesophageal reflux disease)      Hyperlipidemia LDL goal <130 5/9/2010     Malignant melanoma of skin of trunk, except scrotum (H)      Nonspecific abnormal finding     has living will 2004 -      Nontoxic multinodular goiter     no further eval /tx rec per pt     Osteopenia      Other psoriasis      Personal history of colonic polyps      PMR (polymyalgia rheumatica) (H)      Stress-induced cardiomyopathy      Undiagnosed cardiac murmurs      Unspecified constipation      Unspecified essential hypertension        FAMILY HISTORY:  Family History   Problem Relation Age of Onset     CANCER Father      dec - esophageal and laryngeal     HEART DISEASE Mother      Respiratory Mother      dec       SOCIAL HISTORY:  Social History     Social History     Marital status:      Spouse name:      Number of children: 2     Years of education: N/A     Occupational History      Retired     Social History Main Topics     Smoking status: Never Smoker     Smokeless tobacco: Never Used     Alcohol use No      Comment: 6 times per year-one drink     Drug use: No     Sexual activity: Yes     Partners: Male     Other Topics Concern      Service No     Blood Transfusions No     Exercise Yes     curves     Seat Belt Yes     Self-Exams Yes     Parent/Sibling W/ Cabg, Mi Or Angioplasty Before 65f 55m? No     Social History Narrative    December 7, 2009    Balanced Diet - Yes    Osteoporosis Prevention Measures - Dairy servings per day: 2 and Medication/Supplements (See current meds)    Regular Exercise -  Yes Describe curves, walking    Dental Exam - YES - Date: 10/2009     Eye Exam - YES - Date: 2008    Self Breast Exam - Yes    Abuse: Current or Past (Physical, Sexual or Emotional)- No    Do you feel safe in your environment - Yes    Guns stored in the home - No    Sunscreen used - Yes    Seatbelts used - Yes    Lipids -  YES - Date: 11/24/2009    Glucose -  YES - Date: 11/24/2009    Colon Cancer Screening - Colonoscopy 11/18/2005(date completed)    Hemoccults - YES - Date: 10/12/2004    Pap Test -  YES - Date: 09/22/2004    Do you have any concerns about STD's -  No    Mammography - YES - Date: 11/24/2009    DEXA - YES - Date: 11/20/2008    Immunizations reviewed and up to date - Yes    PAULETTE Spencer CMA                   CURRENT MEDICATIONS:    Current Outpatient Prescriptions on File Prior to Visit:  sertraline (ZOLOFT) 25 MG tablet    acetaminophen-codeine (TYLENOL #3) 300-30 MG per tablet Take 1 tablet by mouth every 6 hours as needed for pain maximum 4 tablet(s) per day   diazepam (VALIUM) 2 MG tablet Take 1 tablet (2 mg) by mouth every 12 hours as needed for anxiety or sleep   sertraline (ZOLOFT) 50 MG tablet Take 1 tablet (50 mg) by mouth daily   ferrous sulfate (IRON) 325 (65 FE) MG tablet Take 1 tablet (325 mg) by mouth daily (with breakfast)   irbesartan (AVAPRO) 300 MG tablet TAKE 1 TABLET EVERY DAY   propranolol (INDERAL LA) 60 MG 24 hr capsule Take 1 capsule (60 mg) by mouth daily   methimazole (TAPAZOLE) 5 MG tablet Take 1 tablet (5 mg) by mouth 3 times daily   ASPIRIN PO Take 81 mg by mouth At Bedtime   Calcium carb-Vitamin D 500 mg Craig-200 units (OSCAL WITH D;OYSTER SHELL CALCIUM) 500-200 MG-UNIT per tablet Take 1 tablet by mouth 2 times daily (with meals)   cycloSPORINE (RESTASIS) 0.05 % ophthalmic emulsion Place 1 drop into both eyes 2 times daily   polyethylene glycol 0.4%- propylene glycol 0.3% (SYSTANE) 0.4-0.3 % SOLN ophthalmic solution Place 1 drop into both eyes 3 times daily as needed for dry eyes   furosemide (LASIX) 20 MG tablet Take 1 tablet (20 mg) by  "mouth daily   nitroGLYcerin (NITROSTAT) 0.4 MG sublingual tablet For chest pain place 1 tablet under the tongue every 5 minutes for 3 doses. If symptoms persist 5 minutes after 1st dose call 911.   CRANBERRY CONCENTRATE PO Take 1 capsule by mouth 2 times daily   lovastatin (MEVACOR) 40 MG tablet TAKE 1 TABLET AT BEDTIME (HYPERLIPIDEMIA LDL GOAL  BELOW 130)   omeprazole (PRILOSEC) 20 MG CR capsule Take 1 capsule (20 mg) by mouth daily   alendronate (FOSAMAX) 70 MG tablet TAKE 1 TABLET EVERY 7 DAYS AT LEAST 60 MIN BEFORE BREAKFAST AS DIRECTED \"SEE PACKAGE FOR ADDITIONAL INSTRUCTIONS\"   acetaminophen 650 MG TABS Take 650 mg by mouth every 8 hours as needed for mild pain or fever   polyethylene glycol (MIRALAX/GLYCOLAX) packet Take 17 g by mouth 2 times daily as needed for constipation   Omega-3 Fatty Acids (FISH OIL) 500 MG CAPS Take 1 capsule by mouth 2 times daily    melatonin 3 MG tablet Take 1 tablet (3 mg) by mouth nightly as needed for sleep     No current facility-administered medications on file prior to visit.     ROS:   CONSTITUTIONAL: Denies fever, chills, fatigue, or weight fluctuations.   HEENT: Denies headache, vision changes, and changes in speech.   CV: Refer to HPI.   PULMONARY:Refer to HPI.   GI:Denies nausea, vomiting, diarrhea, and abdominal pain. Bowel movements are regular.   :Denies urinary alterations, dysuria, urinary frequency, hematuria, and abnormal drainage.   EXT:Denies lower extremity edema.   SKIN:Denies abnormal rashes or lesions.   MUSCULOSKELETAL:Denies upper or lower extremity weakness and pain.   NEUROLOGIC:Denies lightheadedness, dizziness, seizures, or upper or lower extremity paresthesia.     EXAM:  /55 (BP Location: Right arm, Patient Position: Chair, Cuff Size: Adult Regular)  Pulse 56  Ht 1.448 m (4' 9\")  Wt 68.5 kg (151 lb)  SpO2 97%  BMI 32.68 kg/m2     GENERAL: Appears comfortable, in no acute distress.   HEENT: Eye symmetrical, no discharge or icterus " bilaterally. Mucous membranes moist and without lesions.  CV: Irregularly irregular, +S1S2, no appreciable murmur, rub, or gallop. JVP not visible at 75 degrees.   RESPIRATORY: Respirations regular, even, and unlabored. Lungs CTA throughout.   GI: Soft and non distended with normoactive bowel sounds present in all quadrants. No tenderness, rebound, guarding. No hepatomegaly.   EXTREMITIES: Non pitting mild edema. 2+ bilateral pedal pulses.   NEUROLOGIC: Alert and oriented x 3. No focal deficits.   MUSCULOSKELETAL: No joint swelling or tenderness.   SKIN: No jaundice. No rashes or lesions.     Labs, reviewed with patient in clinic today:  CBC RESULTS:  Lab Results   Component Value Date    WBC 8.1 01/17/2018    RBC 4.13 01/17/2018    HGB 12.3 02/19/2018    HCT 37.1 01/17/2018    MCV 90 01/17/2018    MCH 29.3 01/17/2018    MCHC 32.6 01/17/2018    RDW 13.9 01/17/2018     01/17/2018       CMP RESULTS:  Lab Results   Component Value Date     02/26/2018    POTASSIUM 4.2 02/26/2018    CHLORIDE 100 02/26/2018    CO2 27 02/26/2018    ANIONGAP 8 02/26/2018     (H) 02/26/2018    BUN 15 02/26/2018    CR 0.80 02/26/2018    GFRESTIMATED 68 02/26/2018    GFRESTBLACK 82 02/26/2018    SHREYA 9.2 02/26/2018    BILITOTAL 0.5 12/13/2017    ALBUMIN 3.0 (L) 12/13/2017    ALKPHOS 74 12/13/2017    ALT 85 (H) 12/13/2017    AST 76 (H) 12/13/2017        INR RESULTS:  Lab Results   Component Value Date    INR 1.71 (H) 01/16/2018       Lab Results   Component Value Date    MAG 2.1 01/16/2018     Lab Results   Component Value Date    NTBNPI 636 01/16/2018     Lab Results   Component Value Date    NTBNP 461 (H) 02/26/2018       Diagnostics:  TTE 1/16/18  Left ventricular function is normal.The EF is > 65%.  The inferior vena cava was normal in size with preserved respiratory  variability.  No pericardial effusion is present.  The left ventricular function has improved.    Coronary angiogram 12/13/17      TTE 12/13/1  ormal left  ventricular size with mild concentric hypertrophy.  Global akinesis with sparing of the basal segments consistent with stress  cardiomyopathy versus multivessel coronary artery disease. Moderately reduced  systolic function. EF 30-35%.  Normal right ventricular size and function.  Severe left atrial enlargement.  No pericardial effusion.     Compared to the prior study 10/31/15 the wall motion abnormalities and LV  dysfunction are new.    Assessment and Plan:   Ms. Oviedo is a 84 year old female with history of stress induced cardiomyopathy and systolic heart failure now with recovered/normalized LV function and also history of hyperthyroidism on methimazole. Today, she is clinically stable from heart failure standpoint and euvolemic to dry. We will cut back on lasix dosing today as she likely no longer needs this (offered to dc altogether, she prefers to halve dose). She will continue ARB for now, and is on propranolol for hyperthyroidism.     # History of systolic heart failure/HFrEF with recovered EF (65%) secondary to stress induced cardiomyopathy    Stage C. NYHA Class II.    Fluid status: euvolemic  to dry, decrease lasix to 10 mg daily, consider dc next visit  ACEi/ARB: Irbesartan 300 mg daily  BB: propanolol which she is taking for hyperthyroidism, consider increasing dose to help with tremors  Aldosterone antagonist:  spironolactone d/c'ed secondary to hyponatremia, Na now normal  SCD prophylaxis: does not meet criteria for implant    # History of atrial fibrillation, paroxysmal, RVR at time of diagnosis  Very symptomatic at the time, should do holter monitor for any recurrence of palpitations as may need additional rate control (on propranolol now). Spontaneously converted to sinus in ED. ECG shows sinus rhythm with SA but we can consider heart monitor to assess afib burden - will discuss. [id number] score 4 - reviewed annual stroke risk with patient and her daughter. Also reviewed HAS BLED score and  bleeding risk. She did have hematuria with one dose of AC but now is s/p bladder mass bx which was negative. They will consider anticoagulation, she is currently taking ASA 81 mg. In the meantime, we will discuss with urology re: the likelihood of bladder masses bleeding.    # Hyperthyroidism: Treated with methimazole and propanolol.  Follow up with endocrinology.    # HTN: 124/55 today. Taking Irbesartan and propanolol. Continue to monitor.     # Tremors: Encouraged patient to follow-up with neurology as recommend by PCP. Per endocrine, unrelated to her hyperthyroid. Could increase propranolol for symptomatic treatment.       Follow up in May in CORE clinic. She should see Dr. Griffith after that appointment.            40 minutes spent face-to-face with patient, >50% in counseling and/or coordination of care as described above    Elizabeth Richards DNP, NP-C  2/26/2018      CC  INPATIENT, ATTENDING PHYSICIAN [facility]

## 2018-02-26 NOTE — PROGRESS NOTES
HPI:   Ms. Oviedo is a 84 year old female with a past medical history including HTN, multinodular goiter, GERD, hyperthyroidism, stress cardiomyopathy (dx 12/2017) now with recovered LV systolic function, history of atrial fibrillation with RVR. Presents to clinic for CORE follow-up.    She initially presented to [facility] 12/13 with persistent chest pain and shortness of breath, and was found to have an elevated troponin.  EKG was negative for ischemic changes.  Chest CT was negative for PE, but showed bilateral pleural effusions. Echocardiogram showed normal LV size with mild concentric hypertrophy, LVEF 30-35%, and global akinesis with sparing of the basal segments consistent with stress cardiomyopathy vs multivessel CAD. Coronary angiogram revealed no obstructive CAD.  Findings all suggested stress-induced cardiomyopathy, so she was discharged on BB, ARB, statin, and aspirin. She was then admitted overnight on 1/16 for palpitations and was found to be in afib with RVR in the setting of volume overload and ongoing hyperthyroidism (though free t4 was normal that admission). She converted to sinus spontaneously. She was given a one time dose of xarelto and developed significant hematuria so this was d/c'ed. She is now s/p bladder mass bx which were negative for malignancy. An echo during that admission showed a normalized EF.     Since last CORE visit, patient reports feeling ok. She is feeling anxious and reports a new tremor. This has been discussed with PCP and endocrine - not felt to be 2/2 thyroid. It was recommended she see neurology but she has not. From a cardiac standpoint, she is feeling well. Denies LE edema, orthopnea, pnd, shortness of breath, abdominal bloating. Appetite is fair. She does endorse a dry mouth. No further bleeding issues, she is taking a baby ASA. No chest pain, palpitations, or presyncope.       PAST MEDICAL HISTORY:  Past Medical History:   Diagnosis Date     Calculus of kidney       Esophageal reflux      GERD (gastroesophageal reflux disease)      Hyperlipidemia LDL goal <130 5/9/2010     Malignant melanoma of skin of trunk, except scrotum (H)      Nonspecific abnormal finding     has living will 2004 -      Nontoxic multinodular goiter     no further eval /tx rec per pt     Osteopenia      Other psoriasis      Personal history of colonic polyps      PMR (polymyalgia rheumatica) (H)      Stress-induced cardiomyopathy      Undiagnosed cardiac murmurs      Unspecified constipation      Unspecified essential hypertension        FAMILY HISTORY:  Family History   Problem Relation Age of Onset     CANCER Father      dec - esophageal and laryngeal     HEART DISEASE Mother      Respiratory Mother      dec       SOCIAL HISTORY:  Social History     Social History     Marital status:      Spouse name:      Number of children: 2     Years of education: N/A     Occupational History      Retired     Social History Main Topics     Smoking status: Never Smoker     Smokeless tobacco: Never Used     Alcohol use No      Comment: 6 times per year-one drink     Drug use: No     Sexual activity: Yes     Partners: Male     Other Topics Concern      Service No     Blood Transfusions No     Exercise Yes     curves     Seat Belt Yes     Self-Exams Yes     Parent/Sibling W/ Cabg, Mi Or Angioplasty Before 65f 55m? No     Social History Narrative    December 7, 2009    Balanced Diet - Yes    Osteoporosis Prevention Measures - Dairy servings per day: 2 and Medication/Supplements (See current meds)    Regular Exercise -  Yes Describe curves, walking    Dental Exam - YES - Date: 10/2009    Eye Exam - YES - Date: 2008    Self Breast Exam - Yes    Abuse: Current or Past (Physical, Sexual or Emotional)- No    Do you feel safe in your environment - Yes    Guns stored in the home - No    Sunscreen used - Yes    Seatbelts used - Yes    Lipids -  YES - Date: 11/24/2009    Glucose -  YES - Date: 11/24/2009     Colon Cancer Screening - Colonoscopy 11/18/2005(date completed)    Hemoccults - YES - Date: 10/12/2004    Pap Test -  YES - Date: 09/22/2004    Do you have any concerns about STD's -  No    Mammography - YES - Date: 11/24/2009    DEXA - YES - Date: 11/20/2008    Immunizations reviewed and up to date - Yes    PAULETTE Spencer CMA                   CURRENT MEDICATIONS:    Current Outpatient Prescriptions on File Prior to Visit:  sertraline (ZOLOFT) 25 MG tablet    acetaminophen-codeine (TYLENOL #3) 300-30 MG per tablet Take 1 tablet by mouth every 6 hours as needed for pain maximum 4 tablet(s) per day   diazepam (VALIUM) 2 MG tablet Take 1 tablet (2 mg) by mouth every 12 hours as needed for anxiety or sleep   sertraline (ZOLOFT) 50 MG tablet Take 1 tablet (50 mg) by mouth daily   ferrous sulfate (IRON) 325 (65 FE) MG tablet Take 1 tablet (325 mg) by mouth daily (with breakfast)   irbesartan (AVAPRO) 300 MG tablet TAKE 1 TABLET EVERY DAY   propranolol (INDERAL LA) 60 MG 24 hr capsule Take 1 capsule (60 mg) by mouth daily   methimazole (TAPAZOLE) 5 MG tablet Take 1 tablet (5 mg) by mouth 3 times daily   ASPIRIN PO Take 81 mg by mouth At Bedtime   Calcium carb-Vitamin D 500 mg [place]-200 units (OSCAL WITH D;OYSTER SHELL CALCIUM) 500-200 MG-UNIT per tablet Take 1 tablet by mouth 2 times daily (with meals)   cycloSPORINE (RESTASIS) 0.05 % ophthalmic emulsion Place 1 drop into both eyes 2 times daily   polyethylene glycol 0.4%- propylene glycol 0.3% (SYSTANE) 0.4-0.3 % SOLN ophthalmic solution Place 1 drop into both eyes 3 times daily as needed for dry eyes   furosemide (LASIX) 20 MG tablet Take 1 tablet (20 mg) by mouth daily   nitroGLYcerin (NITROSTAT) 0.4 MG sublingual tablet For chest pain place 1 tablet under the tongue every 5 minutes for 3 doses. If symptoms persist 5 minutes after 1st dose call 911.   CRANBERRY CONCENTRATE PO Take 1 capsule by mouth 2 times daily   lovastatin (MEVACOR) 40 MG tablet TAKE 1 TABLET AT BEDTIME  "(HYPERLIPIDEMIA LDL GOAL  BELOW 130)   omeprazole (PRILOSEC) 20 MG CR capsule Take 1 capsule (20 mg) by mouth daily   alendronate (FOSAMAX) 70 MG tablet TAKE 1 TABLET EVERY 7 DAYS AT LEAST 60 MIN BEFORE BREAKFAST AS DIRECTED \"SEE PACKAGE FOR ADDITIONAL INSTRUCTIONS\"   acetaminophen 650 MG TABS Take 650 mg by mouth every 8 hours as needed for mild pain or fever   polyethylene glycol (MIRALAX/GLYCOLAX) packet Take 17 g by mouth 2 times daily as needed for constipation   Omega-3 Fatty Acids (FISH OIL) 500 MG CAPS Take 1 capsule by mouth 2 times daily    melatonin 3 MG tablet Take 1 tablet (3 mg) by mouth nightly as needed for sleep     No current facility-administered medications on file prior to visit.     ROS:   CONSTITUTIONAL: Denies fever, chills, fatigue, or weight fluctuations.   HEENT: Denies headache, vision changes, and changes in speech.   CV: Refer to HPI.   PULMONARY:Refer to HPI.   GI:Denies nausea, vomiting, diarrhea, and abdominal pain. Bowel movements are regular.   :Denies urinary alterations, dysuria, urinary frequency, hematuria, and abnormal drainage.   EXT:Denies lower extremity edema.   SKIN:Denies abnormal rashes or lesions.   MUSCULOSKELETAL:Denies upper or lower extremity weakness and pain.   NEUROLOGIC:Denies lightheadedness, dizziness, seizures, or upper or lower extremity paresthesia.     EXAM:  /55 (BP Location: Right arm, Patient Position: Chair, Cuff Size: Adult Regular)  Pulse 56  Ht 1.448 m (4' 9\")  Wt 68.5 kg (151 lb)  SpO2 97%  BMI 32.68 kg/m2     GENERAL: Appears comfortable, in no acute distress.   HEENT: Eye symmetrical, no discharge or icterus bilaterally. Mucous membranes moist and without lesions.  CV: Irregularly irregular, +S1S2, no appreciable murmur, rub, or gallop. JVP not visible at 75 degrees.   RESPIRATORY: Respirations regular, even, and unlabored. Lungs CTA throughout.   GI: Soft and non distended with normoactive bowel sounds present in all quadrants. No " tenderness, rebound, guarding. No hepatomegaly.   EXTREMITIES: Non pitting mild edema. 2+ bilateral pedal pulses.   NEUROLOGIC: Alert and oriented x 3. No focal deficits.   MUSCULOSKELETAL: No joint swelling or tenderness.   SKIN: No jaundice. No rashes or lesions.     Labs, reviewed with patient in clinic today:  CBC RESULTS:  Lab Results   Component Value Date    WBC 8.1 01/17/2018    RBC 4.13 01/17/2018    HGB 12.3 02/19/2018    HCT 37.1 01/17/2018    MCV 90 01/17/2018    MCH 29.3 01/17/2018    MCHC 32.6 01/17/2018    RDW 13.9 01/17/2018     01/17/2018       CMP RESULTS:  Lab Results   Component Value Date     02/26/2018    POTASSIUM 4.2 02/26/2018    CHLORIDE 100 02/26/2018    CO2 27 02/26/2018    ANIONGAP 8 02/26/2018     (H) 02/26/2018    BUN 15 02/26/2018    CR 0.80 02/26/2018    GFRESTIMATED 68 02/26/2018    GFRESTBLACK 82 02/26/2018    SHREYA 9.2 02/26/2018    BILITOTAL 0.5 12/13/2017    ALBUMIN 3.0 (L) 12/13/2017    ALKPHOS 74 12/13/2017    ALT 85 (H) 12/13/2017    AST 76 (H) 12/13/2017        INR RESULTS:  Lab Results   Component Value Date    INR 1.71 (H) 01/16/2018       Lab Results   Component Value Date    MAG 2.1 01/16/2018     Lab Results   Component Value Date    NTBNPI 636 01/16/2018     Lab Results   Component Value Date    NTBNP 461 (H) 02/26/2018       Diagnostics:  TTE 1/16/18  Left ventricular function is normal.The EF is > 65%.  The inferior vena cava was normal in size with preserved respiratory  variability.  No pericardial effusion is present.  The left ventricular function has improved.    Coronary angiogram 12/13/17      TTE 12/13/1  ormal left ventricular size with mild concentric hypertrophy.  Global akinesis with sparing of the basal segments consistent with stress  cardiomyopathy versus multivessel coronary artery disease. Moderately reduced  systolic function. EF 30-35%.  Normal right ventricular size and function.  Severe left atrial enlargement.  No pericardial  effusion.     Compared to the prior study 10/31/15 the wall motion abnormalities and LV  dysfunction are new.    Assessment and Plan:   Ms. Oviedo is a 84 year old female with history of stress induced cardiomyopathy and systolic heart failure now with recovered/normalized LV function and also history of hyperthyroidism on methimazole. Today, she is clinically stable from heart failure standpoint and euvolemic to dry. We will cut back on lasix dosing today as she likely no longer needs this (offered to dc altogether, she prefers to halve dose). She will continue ARB for now, and is on propranolol for hyperthyroidism.     # History of systolic heart failure/HFrEF with recovered EF (65%) secondary to stress induced cardiomyopathy    Stage C. NYHA Class II.    Fluid status: euvolemic to dry, decrease lasix to 10 mg daily, consider dc next visit  ACEi/ARB: Irbesartan 300 mg daily  BB: propanolol which she is taking for hyperthyroidism, consider increasing dose to help with tremors  Aldosterone antagonist: spironolactone d/c'ed secondary to hyponatremia, Na now normal  SCD prophylaxis: does not meet criteria for implant    # History of atrial fibrillation, paroxysmal, RVR at time of diagnosis  Very symptomatic at the time, should do holter monitor for any recurrence of palpitations as may need additional rate control (on propranolol now). Spontaneously converted to sinus in ED. ECG shows sinus rhythm with SA but we can consider heart monitor to assess afib burden - will discuss. [id number] score 4 - reviewed annual stroke risk with patient and her daughter. Also reviewed HAS BLED score and bleeding risk. She did have hematuria with one dose of AC but now is s/p bladder mass bx which was negative. They will consider anticoagulation, she is currently taking ASA 81 mg. In the meantime, we will discuss with urology re: the likelihood of bladder masses bleeding.    # Hyperthyroidism: Treated with methimazole and propanolol.   Follow up with endocrinology.    # HTN: 124/55 today. Taking Irbesartan and propanolol. Continue to monitor.     # Tremors: Encouraged patient to follow-up with neurology as recommend by PCP. Per endocrine, unrelated to her hyperthyroid. Could increase propranolol for symptomatic treatment.       Follow up in May in [facility] clinic. She should see Dr. Griffith after that appointment.            40 minutes spent face-to-face with patient, >50% in counseling and/or coordination of care as described above    Elizabeth Richards, DESMOND, NP-C  2/26/2018          CC  INPATIENT, ATTENDING PHYSICIAN [facility]

## 2018-02-26 NOTE — NURSING NOTE
Diet: Patient instructed regarding a heart failure healthy diet, including discussion of reduced fat and 2000 mg daily sodium restriction, daily weights, medication purpose and compliance, fluid restrictions and resources for patient and family to access for assistance with heart failure management.       Labs: Patient was given results of the laboratory testing obtained today and patient was instructed about when to return for the next laboratory testing.    Med Reconcile: Reviewed and verified all current medications with the patient. The updated medication list was printed and given to the patient.    Return Appointment: Patient given instructions regarding scheduling next clinic visit.     Patient stated she understood all health information given and agreed to call with further questions or concerns.    Today's clinic:   EKG completed in clinic  Eris Johnson will send a message to your urologist  Pending urology and ekg, we will discuss blood thinners  We will schedule you to see Dr. Griffith in a few months for f/u  We will see you in CORE clinic in May.

## 2018-02-26 NOTE — MR AVS SNAPSHOT
After Visit Summary   2/26/2018    Dimple Oviedo    MRN: [id number]           Patient Information     Date Of Birth          6/23/1933        Visit Information        Provider Department      2/26/2018 12:00 PM Crista Richards APRN CNP [facility]        Today's Diagnoses     Chronic systolic heart failure (H)    -  1    Irregular heart rate        Elevated troponin          Care Instructions    You were seen today in the Cardiovascular Clinic at the [facility].     Cardiology Providers you saw during your visit: Elizabeth DAMON CNP      1. Decrease lasix to 10 mg daily - continue to weigh yourself, if you gain 5 lb over a few days or have more swelling or shortness of breath, call us at [phone] (ATUL [place])    2. I will send a message to the urologist re: blood thinner, can you also plan to ask her when you see her on March 8? We will be in touch whether or not to start a blood thinner     3. We will then plan to discuss whether or not to start you on a blood thinner    4. We will have you see Dr. Griffith in clinic in a few months to follow-up     5. We will see you in CORE clinic in May     Results for DIMPLE OVIEDO (MRN [id number]) as of 2/26/2018 12:10   Ref. Range 2/26/2018 11:30   Sodium Latest Ref Range: 133 - 144 mmol/L 135   Potassium Latest Ref Range: 3.4 - 5.3 mmol/L 4.2   Chloride Latest Ref Range: 94 - 109 mmol/L 100   Carbon Dioxide Latest Ref Range: 20 - 32 mmol/L 27   Urea Nitrogen Latest Ref Range: 7 - 30 mg/dL 15   Creatinine Latest Ref Range: 0.52 - 1.04 mg/dL 0.80   GFR Estimate Latest Ref Range: >60 mL/min/1.7m2 68   GFR Estimate If Black Latest Ref Range: >60 mL/min/1.7m2 82   Calcium Latest Ref Range: 8.5 - 10.1 mg/dL 9.2   Anion Gap Latest Ref Range: 3 - 14 mmol/L 8   Glucose Latest Ref Range: 70 - 99 mg/dL 137 (H)            Please limit your fluid intake to 2 L (64 ounces) daily.  2 Liters a day = 8.5 cups, or 72 ounces.  Please limit your salt  "intake to 2 grams a day or less.    If you gain 2# in 24 hours or 5# in one week call Kayli Olsen RN so we can adjust your medications as needed over the phone.    Please feel free to call me with any questions or concerns.      Kayli Olsen RN BSN   [facility]  Cardiology Care Coordinator-Heart Failure Clinic    Questions and [phone].   First press #1 for the University and then press #3 for \"Medical Questions\" to reach us Cardiology Nurses.     On Call Cardiologist for after hours or on weekends: [phone]   option #4 and ask to speak to the on-call Cardiologist. Inform them you are a CORE/heart failure patient at the [facility].        If you need a medication refill please contact your pharmacy.  Please allow 3 business days for your refill to be completed.  _______________________________________________________  C.O.R.E. CLINIC Cardiomyopathy, Optimization, Rehabilitation, Education   The C.O.R.E. CLINIC is a heart failure specialty clinic within the [facility] where you will work with specialized nurse practitioners dedicated to helping patients with heart failure carefully adjust medications, receive education, and learn who and when to call if symptoms develop. They specialize in helping you better understand your condition, slow the progression of your disease, improve the length and quality of your life, help you detect future heart problems before they become life threatening, and avoid hospitalizations.  As always, thank you for trusting us with your health care needs!                Follow-ups after your visit        Additional Services     Follow-Up with CORE Clinic       With helena iraheta in May                  Your next 10 appointments already scheduled     Mar 08, 2018  9:15 AM CST   (Arrive by 9:00 AM)   Post-Op with Kenna La MD   [facility] Urology and [facility] for Prostate and Urologic Cancers ([facility] Clinics " [facility])    [address] [place] [address]  4th Floor  [facility] [address]   [phone]            May 21, 2018 12:30 PM CDT   Lab with SANJAY LAB   [facility] ([facility])    [address] [place] [address]  1st Floor  [facility] [address]   [phone]            May 21, 2018  1:00 PM CDT   (Arrive by 12:45 PM)   CORE RETURN with NELSON Smart CNP   [facility] ([facility])    [address] [place] [address]  [address]  [facility] [address] [phone]            May 29, 2018 10:00 AM CDT   (Arrive by 9:45 AM)   RETURN ENDOCRINE with Dhara Meza MD   [facility] ([facility])    [address] [place] [address]  3rd [facility] [address]   [phone]            Jul 05, 2018  1:30 PM CDT   (Arrive by 1:15 PM)   NEW ARRHYTHMIA with Butch Griffith MD   [facility] ([facility])    [address] [place] [address]  [address] [place] [id number] [phone]              Future tests that were ordered for you today     Open Future Orders        Priority Expected Expires Ordered    Follow-Up with CORE Clinic Routine 5/1/2018 6/3/2018 2/26/2018            Who to contact     If you have questions or need follow up information about today's clinic visit or your schedule please contact [facility] directly at [phone].  Normal or non-critical lab and imaging results will be communicated to you by MyChart, letter or phone within 4 business days after the clinic has received the results. If you do not hear from us within 7 days, please contact the clinic through MyChart or phone. If you have a critical or abnormal lab result, we will notify you by phone as soon as possible.  Submit refill requests through [email or web address] or call your pharmacy and they will forward the refill request to us. Please allow 3 business days for your refill to be completed.          Additional  "Information About Your Visit        MyChart Information     [email or web address] gives you secure access to your electronic health record. If you see a primary care provider, you can also send messages to your care team and make appointments. If you have questions, please call your primary care clinic.  If you do not have a primary care provider, please call [phone] and they will assist you.        Care EveryWhere ID     This is your Care EveryWhere ID. This could be used by other organizations to access your [facility] medical records  [id number]        Your Vitals Were     Pulse Height Pulse Oximetry BMI (Body Mass Index)          56 1.448 m (4' 9\") 97% 32.68 kg/m2         Blood Pressure from Last 3 Encounters:   02/26/18 124/55   02/19/18 143/66   02/07/18 (!) 102/36    Weight from Last 3 Encounters:   02/26/18 68.5 kg (151 lb)   02/19/18 69.4 kg (153 lb)   02/07/18 69.4 kg (153 lb)              We Performed the Following     EKG 12-lead, tracing only (Same Day)     Follow-Up with CORE Clinic     N terminal pro BNP outpatient          Today's Medication Changes          These changes are accurate as of 2/26/18  1:09 PM.  If you have any questions, ask your nurse or doctor.               These medicines have changed or have updated prescriptions.        Dose/Directions    furosemide 20 MG tablet   Commonly known as:  LASIX   This may have changed:  how much to take   Used for:  Elevated troponin   Changed by:  Crista Richards, APRN CNP        Dose:  10 mg   Take 0.5 tablets (10 mg) by mouth daily   Quantity:  30 tablet   Refills:  3            Where to get your medicines      These medications were sent to [facility] [address] [facility] [address] [place] [address] AT [address] [address]     Phone:  [phone]     furosemide 20 MG tablet                Primary Care Provider Office Phone # Fax #    Pop Zapien [phone]       " "[address] [place] [address]        Equal Access to Services     GUNNER RODRIGUEZ : Hadii shameka chacon bryce Danielsali, waberylda luradhatrnugha, qaybta josephazeemcristian alejandreadincristian, maldonado enamoradomianannemarie bey. So [facility] [phone].    ATENCIÓN: Si habla español, tiene a sierra disposición servicios gratuitos de asistencia lingüística. [facility] [phone].    We comply with applicable federal civil rights laws and [place] laws. We do not discriminate on the basis of race, color, national origin, age, disability, sex, sexual orientation, or gender identity.            Thank you!     Thank you for choosing [facility]  for your care. Our goal is always to provide you with excellent care. Hearing back from our patients is one way we can continue to improve our services. Please take a few minutes to complete the written survey that you may receive in the mail after your visit with us. Thank you!             Your Updated Medication List - Protect others around you: Learn how to safely use, store and throw away your medicines at www.disposemymeds.org.          This list is accurate as of 2/26/18  1:09 PM.  Always use your most recent med list.                   Brand Name Dispense Instructions for use Diagnosis    acetaminophen 650 MG 8 hour tablet     100 tablet    Take 650 mg by mouth every 8 hours as needed for mild pain or fever    Ascending cholangitis       acetaminophen-codeine 300-30 MG per tablet    TYLENOL #3    8 tablet    Take 1 tablet by mouth every 6 hours as needed for pain maximum 4 tablet(s) per day    Other microscopic hematuria       alendronate 70 MG tablet    FOSAMAX    12 tablet    TAKE 1 TABLET EVERY 7 DAYS AT LEAST 60 MIN BEFORE BREAKFAST AS DIRECTED \"SEE PACKAGE FOR ADDITIONAL INSTRUCTIONS\"    High risk medication use, Osteopenia       ASPIRIN PO      Take 81 mg by mouth At Bedtime        Calcium carb-Vitamin D 500 mg [place]-200 units 500-200 MG-UNIT per tablet    OSCAL with D;Oyster Shell " Calcium     Take 1 tablet by mouth 2 times daily (with meals)        CRANBERRY CONCENTRATE PO      Take 1 capsule by mouth 2 times daily        cycloSPORINE 0.05 % ophthalmic emulsion    RESTASIS     Place 1 drop into both eyes 2 times daily        diazepam 2 MG tablet    VALIUM    20 tablet    Take 1 tablet (2 mg) by mouth every 12 hours as needed for anxiety or sleep    THERON (generalized anxiety disorder)       ferrous sulfate 325 (65 FE) MG tablet    IRON     Take 1 tablet (325 mg) by mouth daily (with breakfast)    Iron deficiency anemia, unspecified iron deficiency anemia type       Fish Oil 500 MG Caps      Take 1 capsule by mouth 2 times daily        furosemide 20 MG tablet    LASIX    30 tablet    Take 0.5 tablets (10 mg) by mouth daily    Elevated troponin       irbesartan 300 MG tablet    AVAPRO    90 tablet    TAKE 1 TABLET EVERY DAY    Essential hypertension with goal blood pressure less than 140/90       lovastatin 40 MG tablet    MEVACOR    90 tablet    TAKE 1 TABLET AT BEDTIME (HYPERLIPIDEMIA LDL GOAL  BELOW 130)    Hyperlipidemia LDL goal <130       melatonin 3 MG tablet     90 tablet    Take 1 tablet (3 mg) by mouth nightly as needed for sleep    Insomnia       methimazole 5 MG tablet    TAPAZOLE    270 tablet    Take 1 tablet (5 mg) by mouth 3 times daily    Nontoxic multinodular goiter       nitroGLYcerin 0.4 MG sublingual tablet    NITROSTAT    25 tablet    For chest pain place 1 tablet under the tongue every 5 minutes for 3 doses. If symptoms persist 5 minutes after 1st dose call 911.    Elevated troponin       omeprazole 20 MG CR capsule    priLOSEC    180 capsule    Take 1 capsule (20 mg) by mouth daily    Gastroesophageal reflux disease without esophagitis       polyethylene glycol Packet    MIRALAX/GLYCOLAX    7 packet    Take 17 g by mouth 2 times daily as needed for constipation    Other constipation       propranolol 60 MG 24 hr capsule    INDERAL LA    90 capsule    Take 1 capsule (60 mg)  by mouth daily    Nontoxic multinodular goiter       * sertraline 25 MG tablet    ZOLOFT          * sertraline 50 MG tablet    ZOLOFT    90 tablet    Take 1 tablet (50 mg) by mouth daily    THERON (generalized anxiety disorder)       SYSTANE 0.4-0.3 % Soln ophthalmic solution   Generic drug:  polyethylene glycol 0.4%- propylene glycol 0.3%      Place 1 drop into both eyes 3 times daily as needed for dry eyes        * Notice:  This list has 2 medication(s) that are the same as other medications prescribed for you. Read the directions carefully, and ask your doctor or other care provider to review them with you.

## 2018-02-28 LAB — INTERPRETATION ECG - MUSE: NORMAL

## 2018-03-01 ENCOUNTER — PRE VISIT (OUTPATIENT)
Dept: UROLOGY | Facility: CLINIC | Age: 83
End: 2018-03-01

## 2018-03-01 NOTE — TELEPHONE ENCOUNTER
Patient coming in to review pathology. Chart reviewed and all records available. No need for a call.

## 2018-03-02 ENCOUNTER — TELEPHONE (OUTPATIENT)
Dept: FAMILY MEDICINE | Facility: CLINIC | Age: 83
End: 2018-03-02

## 2018-03-08 ENCOUNTER — OFFICE VISIT (OUTPATIENT)
Dept: UROLOGY | Facility: CLINIC | Age: 83
End: 2018-03-08
Payer: MEDICARE

## 2018-03-08 VITALS
HEIGHT: 57 IN | SYSTOLIC BLOOD PRESSURE: 160 MMHG | HEART RATE: 77 BPM | BODY MASS INDEX: 32.15 KG/M2 | DIASTOLIC BLOOD PRESSURE: 79 MMHG | WEIGHT: 149 LBS

## 2018-03-08 DIAGNOSIS — Z98.890 HISTORY OF BIOPSY OF BLADDER: ICD-10-CM

## 2018-03-08 DIAGNOSIS — R31.0 GROSS HEMATURIA: Primary | ICD-10-CM

## 2018-03-08 DIAGNOSIS — E05.90 HYPERTHYROIDISM: ICD-10-CM

## 2018-03-08 LAB
ALBUMIN UR-MCNC: NEGATIVE MG/DL
APPEARANCE UR: ABNORMAL
BILIRUB UR QL STRIP: NEGATIVE
COLOR UR AUTO: YELLOW
GLUCOSE UR STRIP-MCNC: 50 MG/DL
HGB UR QL STRIP: ABNORMAL
HYALINE CASTS #/AREA URNS LPF: 1 /LPF (ref 0–2)
KETONES UR STRIP-MCNC: NEGATIVE MG/DL
LEUKOCYTE ESTERASE UR QL STRIP: ABNORMAL
MUCOUS THREADS #/AREA URNS LPF: PRESENT /LPF
NITRATE UR QL: NEGATIVE
PH UR STRIP: 6 PH (ref 5–7)
RBC #/AREA URNS AUTO: 21 /HPF (ref 0–2)
SOURCE: ABNORMAL
SP GR UR STRIP: 1.01 (ref 1–1.03)
UROBILINOGEN UR STRIP-MCNC: 0 MG/DL (ref 0–2)
WBC #/AREA URNS AUTO: 46 /HPF (ref 0–5)
WBC CLUMPS #/AREA URNS HPF: PRESENT /HPF

## 2018-03-08 PROCEDURE — 87086 URINE CULTURE/COLONY COUNT: CPT | Performed by: UROLOGY

## 2018-03-08 PROCEDURE — 87088 URINE BACTERIA CULTURE: CPT | Performed by: UROLOGY

## 2018-03-08 PROCEDURE — 87186 SC STD MICRODIL/AGAR DIL: CPT | Performed by: UROLOGY

## 2018-03-08 PROCEDURE — 88112 CYTOPATH CELL ENHANCE TECH: CPT | Performed by: UROLOGY

## 2018-03-08 PROCEDURE — 88120 CYTP URNE 3-5 PROBES EA SPEC: CPT | Performed by: UROLOGY

## 2018-03-08 ASSESSMENT — PAIN SCALES - GENERAL: PAINLEVEL: NO PAIN (0)

## 2018-03-08 NOTE — MR AVS SNAPSHOT
After Visit Summary   3/8/2018    Dimple Oviedo    MRN: [id number]           Patient Information     Date Of Birth          6/23/1933        Visit Information        Provider Department      3/8/2018 9:15 AM Kenna La MD [facility] for Prostate and Urologic Cancers        Today's Diagnoses     Gross hematuria    -  1       Follow-ups after your visit        Your next 10 appointments already scheduled     Mar 09, 2018 12:00 PM CST   SHORT with Pop Zapien MD   [facility] ([facility])    [address] [place] [id number] [phone]            Mar 21, 2018 11:30 AM CDT   (Arrive by 11:15 AM)   New Patient Visit with Mushtaq Fajardo MD   [facility]rology ([facility])    [address] [place] [address]  3rd [facility] [id number] [phone]            Apr 26, 2018 10:30 AM CDT   (Arrive by 10:15 AM)   Cystoscopy with Kenna La MD   [facility] for Prostate and Urologic Cancers ([facility])    [address] [place] [address]  4th [facility] [id number] [phone]            May 21, 2018 12:30 PM CDT   Lab with  LAB   [facility] ([facility])    [address] [place] [address]  1st [facility] [id number] [phone]            May 21, 2018  1:00 PM CDT   (Arrive by 12:45 PM)   CORE RETURN with NELSON Smart [facility] ([facility])    [address] [place] [address]  [address]  [facility] [address] [phone]            May 29, 2018 10:00 AM CDT   (Arrive by 9:45 AM)   RETURN ENDOCRINE with Dhara Meza MD   [facility] ([facility])    [address] [place] [id number] [phone]            Jul 05, 2018  1:30 PM CDT   (Arrive by 1:15 PM)   NEW ARRHYTHMIA with Butch Griffith MD  "  [facility] ([facility])    [address] [facility]  [address]  [facility] [address] [phone]              Future tests that were ordered for you today     Open Future Orders        Priority Expected Expires Ordered    TSH Routine 3/26/2018 3/8/2019 3/8/2018    T4 free Routine 3/26/2018 3/8/2019 3/8/2018            Who to contact     Please call your clinic at [phone] to:    Ask questions about your health    Make or cancel appointments    Discuss your medicines    Learn about your test results    Speak to your doctor            Additional Information About Your Visit        MavenharOneTrueFan Information     [employer] gives you secure access to your electronic health record. If you see a primary care provider, you can also send messages to your care team and make appointments. If you have questions, please call your primary care clinic.  If you do not have a primary care provider, please call [phone] and they will assist you.      [employer] is an electronic gateway that provides easy, online access to your medical records. With [employer], you can request a clinic appointment, read your test results, renew a prescription or communicate with your care team.     To access your existing account, please contact your [facility] or call [phone] for assistance.        Care EveryWhere ID     This is your Care EveryWhere ID. This could be used by other organizations to access your [facility] medical records  [id number]        Your Vitals Were     Pulse Height BMI (Body Mass Index)             77 1.448 m (4' 9\") 32.24 kg/m2          Blood Pressure from Last 3 Encounters:   03/08/18 160/79   02/26/18 124/55   02/19/18 143/66    Weight from Last 3 Encounters:   03/08/18 67.6 kg (149 lb)   02/26/18 68.5 kg (151 lb)   02/19/18 69.4 kg (153 lb)              We Performed the Following     Cytology non gyn        Primary Care Provider Office Phone # Fax #    " "Pop Zapien [phone]       [facility] [address] [place] [address]        Equal Access to Services     GUNNER RODRIGUEZ : Aleja shameka chacon bryce Pollack, nadia barnard, yassineta kagus chávez, maldonado mackabdias sotero. So [facility] [phone].    ATENCIÓN: Si habla español, tiene a sierra disposición servicios gratuitos de asistencia lingüística. Llame al [phone].    We comply with applicable federal civil rights laws and [place] laws. We do not discriminate on the basis of race, color, national origin, age, disability, sex, sexual orientation, or gender identity.            Thank you!     Thank you for choosing [facility] UROLOGY AND [facility] FOR PROSTATE AND UROLOGIC CANCERS  for your care. Our goal is always to provide you with excellent care. Hearing back from our patients is one way we can continue to improve our services. Please take a few minutes to complete the written survey that you may receive in the mail after your visit with us. Thank you!             Your Updated Medication List - Protect others around you: Learn how to safely use, store and throw away your medicines at www.disposemymeds.org.          This list is accurate as of 3/8/18 10:03 AM.  Always use your most recent med list.                   Brand Name Dispense Instructions for use Diagnosis    acetaminophen 650 MG 8 hour tablet     100 tablet    Take 650 mg by mouth every 8 hours as needed for mild pain or fever    Ascending cholangitis       acetaminophen-codeine 300-30 MG per tablet    TYLENOL #3    8 tablet    Take 1 tablet by mouth every 6 hours as needed for pain maximum 4 tablet(s) per day    Other microscopic hematuria       alendronate 70 MG tablet    FOSAMAX    12 tablet    TAKE 1 TABLET EVERY 7 DAYS AT LEAST 60 MIN BEFORE BREAKFAST AS DIRECTED \"SEE PACKAGE FOR ADDITIONAL INSTRUCTIONS\"    High risk medication use, Osteopenia       ASPIRIN PO      Take 81 mg by mouth At Bedtime        " Calcium carb-Vitamin D 500 mg [place]-200 units 500-200 MG-UNIT per tablet    OSCAL with D;Oyster Shell Calcium     Take 1 tablet by mouth 2 times daily (with meals)        CRANBERRY CONCENTRATE PO      Take 1 capsule by mouth 2 times daily        cycloSPORINE 0.05 % ophthalmic emulsion    RESTASIS     Place 1 drop into both eyes 2 times daily        diazepam 2 MG tablet    VALIUM    20 tablet    Take 1 tablet (2 mg) by mouth every 12 hours as needed for anxiety or sleep    THERON (generalized anxiety disorder)       ferrous sulfate 325 (65 FE) MG tablet    IRON     Take 1 tablet (325 mg) by mouth daily (with breakfast)    Iron deficiency anemia, unspecified iron deficiency anemia type       Fish Oil 500 MG Caps      Take 1 capsule by mouth 2 times daily        furosemide 20 MG tablet    LASIX    30 tablet    Take 0.5 tablets (10 mg) by mouth daily    Elevated troponin       irbesartan 300 MG tablet    AVAPRO    90 tablet    TAKE 1 TABLET EVERY DAY    Essential hypertension with goal blood pressure less than 140/90       lovastatin 40 MG tablet    MEVACOR    90 tablet    TAKE 1 TABLET AT BEDTIME (HYPERLIPIDEMIA LDL GOAL  BELOW 130)    Hyperlipidemia LDL goal <130       melatonin 3 MG tablet     90 tablet    Take 1 tablet (3 mg) by mouth nightly as needed for sleep    Insomnia       methimazole 5 MG tablet    TAPAZOLE    270 tablet    Take 1 tablet (5 mg) by mouth 3 times daily    Nontoxic multinodular goiter       nitroGLYcerin 0.4 MG sublingual tablet    NITROSTAT    25 tablet    For chest pain place 1 tablet under the tongue every 5 minutes for 3 doses. If symptoms persist 5 minutes after 1st dose call 911.    Elevated troponin       omeprazole 20 MG CR capsule    priLOSEC    180 capsule    Take 1 capsule (20 mg) by mouth daily    Gastroesophageal reflux disease without esophagitis       polyethylene glycol Packet    MIRALAX/GLYCOLAX    7 packet    Take 17 g by mouth 2 times daily as needed for constipation    Other  constipation       propranolol 60 MG 24 hr capsule    INDERAL LA    90 capsule    Take 1 capsule (60 mg) by mouth daily    Nontoxic multinodular goiter       * sertraline 25 MG tablet    ZOLOFT          * sertraline 50 MG tablet    ZOLOFT    90 tablet    Take 1 tablet (50 mg) by mouth daily    THERON (generalized anxiety disorder)       SYSTANE 0.4-0.3 % Soln ophthalmic solution   Generic drug:  polyethylene glycol 0.4%- propylene glycol 0.3%      Place 1 drop into both eyes 3 times daily as needed for dry eyes        * Notice:  This list has 2 medication(s) that are the same as other medications prescribed for you. Read the directions carefully, and ask your doctor or other care provider to review them with you.

## 2018-03-08 NOTE — LETTER
"3/8/2018       RE: Dimple Oviedo  [address]   [facility] [address]     Dear Colleague,    Thank you for referring your patient, Dimple Oviedo, to the [facility] UROLOGY AND INST FOR PROSTATE AND UROLOGIC CANCERS at [facility]. Please see a copy of my visit note below.    March 8, 2018    Postopoerative visit    Patient returns today for follow up s/p cystoscopy with bladder biopsy.  She has had some continued gross hematuria and states that yesterday she passed what seemed to be some tissue.  She is accompanied by her daughter in law, patient is very worried about any future appointments as she states her daughter in law will be having surgery in May.  Patient has many concerns about her continued hematuria, her low appetite, and her low sodium levels. She denies any changes in her health since last visit.    /79  Pulse 77  Ht 1.448 m (4' 9\")  Wt 67.6 kg (149 lb)  BMI 32.24 kg/m2  She is comfortable, in no distress, non-labored breathing.     FINAL DIAGNOSIS:   A. Bladder, posterior wall, biopsy:   - Benign urothelium with mild chronic inflammation   - Negative for dysplasia and malignancy     B. Bladder, , trigone biopsy:   - Benign urothelium with mild chronic inflammation   - Negative for dysplasia and malignancy     A/P: 84 year old F with gross hematuria s/p negative bladder biopsy    We discussed that the lesion that had been seen on her office cystoscopy was not found at time of the OR and that she had negative biopsies.  We then discussed her prior negative cytology with positive FISH.      At this time we discussed given her symptoms today will send a urine culture as well as a cytology.  The plan will be that if the cytology with FISH is negative I would recommend that we repeat cystoscopy in the office in about 3 months.  If the cytology is positive then we can discuss returning to the OR for more random biopsies and selective " cytology.    We discussed that I have been in contact with her cardiology team and they will plan on anticoagulation based on our plan.  If her cytology is negative where we will not be going to the OR in the near future then it would be reasonable from the urologic standpoint that she start her anticoagulation with the understanding that should we need to proceed with biopsy she would have to stop the medication    She will discuss her concerns about her diet and other things with her PCP who she will be seeing tomorrow.      RTC for repeat cystoscopy in 6-12 weeks (recommended 12 weeks but patient is anxious and may decide to return earlier)    Kenna La MD MPH   of Urology    CC  Patient Care Team:  Pop Moreno MD as PCP - General (Family Practice)  Crista Richards APRN CNP as PCP - Cardiology (Nurse Practitioner)  Vincenzo Tovar MD as MD (Family Medicine - Sports Medicine)  Candelaria Raymundo MD as MD (Gastroenterology)  Shavon Bustamante PA-C as Physician Assistant (Physician Assistant)  Liana Aponte, RN as Registered Nurse  Kenna La MD as MD (Urology)  Cristiana Correa, RN as Registered Nurse (Urology)  Pop Moreno MD as Referring Physician (Family Practice)  Kayli Olsen, RN as Clinic Care Coordinator (Cardiology)  POP MORENO

## 2018-03-08 NOTE — PROGRESS NOTES
"March 8, 2018    Postopoerative visit    Patient returns today for follow up s/p cystoscopy with bladder biopsy.  She has had some continued gross hematuria and states that yesterday she passed what seemed to be some tissue.  She is accompanied by her daughter in law, patient is very worried about any future appointments as she states her daughter in law will be having surgery in May.  Patient has many concerns about her continued hematuria, her low appetite, and her low sodium levels. She denies any changes in her health since last visit.    /79  Pulse 77  Ht 1.448 m (4' 9\")  Wt 67.6 kg (149 lb)  BMI 32.24 kg/m2  She is comfortable, in no distress, non-labored breathing.     FINAL DIAGNOSIS:   A. Bladder, posterior wall, biopsy:   - Benign urothelium with mild chronic inflammation   - Negative for dysplasia and malignancy     B. Bladder, , trigone biopsy:   - Benign urothelium with mild chronic inflammation   - Negative for dysplasia and malignancy     A/P: 84 year old F with gross hematuria s/p negative bladder biopsy    We discussed that the lesion that had been seen on her office cystoscopy was not found at time of the OR and that she had negative biopsies.  We then discussed her prior negative cytology with positive FISH.      At this time we discussed given her symptoms today will send a urine culture as well as a cytology.  The plan will be that if the cytology with FISH is negative I would recommend that we repeat cystoscopy in the office in about 3 months.  If the cytology is positive then we can discuss returning to the OR for more random biopsies and selective cytology.    We discussed that I have been in contact with her cardiology team and they will plan on anticoagulation based on our plan.  If her cytology is negative where we will not be going to the OR in the near future then it would be reasonable from the urologic standpoint that she start her anticoagulation with the understanding that " should we need to proceed with biopsy she would have to stop the medication    She will discuss her concerns about her diet and other things with her PCP who she will be seeing tomorrow.      RTC for repeat cystoscopy in 6-12 weeks (recommended 12 weeks but patient is anxious and may decide to return earlier)    Kenna La MD MPH   of Urology    CC  Patient Care Team:  Pop Moreno MD as PCP - General (Family Practice)  Crista Richards APRN CNP as PCP - Cardiology (Nurse Practitioner)  Vincenzo Tovar MD as MD (Family Medicine - Sports Medicine)  Candelaria Raymundo MD as MD (Gastroenterology)  Shavon Bustamante PA-C as Physician Assistant (Physician Assistant)  Liana Aponte, RN as Registered Nurse  Kenna La MD as MD (Urology)  Cristiana Correa, RN as Registered Nurse (Urology)  Pop Moreno MD as Referring Physician (Family Practice)  Kayli Olsen, RN as Clinic Care Coordinator (Cardiology)  POP MORENO

## 2018-03-09 ENCOUNTER — OFFICE VISIT (OUTPATIENT)
Dept: FAMILY MEDICINE | Facility: CLINIC | Age: 83
End: 2018-03-09
Payer: MEDICARE

## 2018-03-09 VITALS
RESPIRATION RATE: 20 BRPM | TEMPERATURE: 97.2 F | DIASTOLIC BLOOD PRESSURE: 72 MMHG | HEART RATE: 48 BPM | BODY MASS INDEX: 32.46 KG/M2 | WEIGHT: 150 LBS | SYSTOLIC BLOOD PRESSURE: 110 MMHG

## 2018-03-09 DIAGNOSIS — D50.9 IRON DEFICIENCY ANEMIA, UNSPECIFIED IRON DEFICIENCY ANEMIA TYPE: Primary | ICD-10-CM

## 2018-03-09 DIAGNOSIS — F41.1 GAD (GENERALIZED ANXIETY DISORDER): ICD-10-CM

## 2018-03-09 DIAGNOSIS — E04.2 NONTOXIC MULTINODULAR GOITER: ICD-10-CM

## 2018-03-09 DIAGNOSIS — I10 ESSENTIAL HYPERTENSION WITH GOAL BLOOD PRESSURE LESS THAN 140/90: ICD-10-CM

## 2018-03-09 PROCEDURE — 99214 OFFICE O/P EST MOD 30 MIN: CPT | Performed by: FAMILY MEDICINE

## 2018-03-09 RX ORDER — PROPRANOLOL HCL 60 MG
60 CAPSULE, EXTENDED RELEASE 24HR ORAL DAILY
Qty: 90 CAPSULE | Refills: 2 | Status: SHIPPED | OUTPATIENT
Start: 2018-03-09 | End: 2018-06-12

## 2018-03-09 RX ORDER — METHIMAZOLE 5 MG/1
5 TABLET ORAL DAILY
Qty: 90 TABLET | Refills: 1 | Status: SHIPPED | OUTPATIENT
Start: 2018-03-09 | End: 2018-07-20

## 2018-03-09 RX ORDER — SERTRALINE HYDROCHLORIDE 100 MG/1
100 TABLET, FILM COATED ORAL DAILY
Qty: 90 TABLET | Refills: 1 | Status: SHIPPED | OUTPATIENT
Start: 2018-03-09 | End: 2018-09-19

## 2018-03-09 RX ORDER — IRBESARTAN 300 MG/1
TABLET ORAL
Qty: 90 TABLET | Refills: 2 | Status: SHIPPED | OUTPATIENT
Start: 2018-03-09 | End: 2019-01-23

## 2018-03-09 NOTE — MR AVS SNAPSHOT
After Visit Summary   3/9/2018    Dimple Oviedo    MRN: [id number]           Patient Information     Date Of Birth          6/23/1933        Visit Information        Provider Department      3/9/2018 12:00 PM Pop Zapien MD [facility]        Today's Diagnoses     THERON (generalized anxiety disorder)        Essential hypertension with goal blood pressure less than 140/90        Nontoxic multinodular goiter          Care Instructions    Stop iron pill    Go up on the dose of sertaline (zoloft)    Follow up in 3 months.           Follow-ups after your visit        Your next 10 appointments already scheduled     Mar 21, 2018 11:30 AM CDT   (Arrive by 11:15 AM)   New Patient Visit with Mushtaq Fajardo MD   [facility] Neurology ([facility])    [address] [place] [address]  [address] [facility] [address]   [phone]            Apr 26, 2018 10:30 AM CDT   (Arrive by 10:15 AM)   Cystoscopy with Kenna La MD   [facility] Urology and [email or web address] for Prostate and Urologic Cancers ([facility])    [address] [place] [address]  [address] [facility] [id number] [phone]            May 21, 2018 12:30 PM CDT   Lab with  LAB   [facility] Lab ([facility])    [address] [place] [address]  [address] [facility] [address]   [phone]            May 21, 2018  1:00 PM CDT   (Arrive by 12:45 PM)   CORE RETURN with NELSON Smart [facility] ([facility])    [address] [place] [address]  [address]  [facility] [address]   [phone]            May 29, 2018 10:00 AM CDT   (Arrive by 9:45 AM)   RETURN ENDOCRINE with Dhara Meza MD   [facility] Endocrinology ([facility])    [address] [place] [address]   [phone]            Jul 05, 2018  1:30 PM CDT   (Arrive by 1:15 PM)   NEW ARRHYTHMIA with Butch Griffith,  MD [facility] ([facility])    [address] [facility]  [address] [place] [address] [phone]              Future tests that were ordered for you today     Open Future Orders        Priority Expected Expires Ordered    TSH Routine 3/26/2018 3/8/2019 3/8/2018    T4 free Routine 3/26/2018 3/8/2019 3/8/2018            Who to contact     If you have questions or need follow up information about today's clinic visit or your schedule please contact [facility] directly at [phone].  Normal or non-critical lab and imaging results will be communicated to you by MyChart, letter or phone within 4 business days after the clinic has received the results. If you do not hear from us within 7 days, please contact the clinic through Epic Playgroundt or phone. If you have a critical or abnormal lab result, we will notify you by phone as soon as possible.  Submit refill requests through Nimbit or call your pharmacy and they will forward the refill request to us. Please allow 3 business days for your refill to be completed.          Additional Information About Your Visit        MyChart Information     Nimbit gives you secure access to your electronic health record. If you see a primary care provider, you can also send messages to your care team and make appointments. If you have questions, please call your primary care clinic.  If you do not have a primary care provider, please call [phone] and they will assist you.        Care EveryWhere ID     This is your Care EveryWhere ID. This could be used by other organizations to access your [facility] medical records  [id number]        Your Vitals Were     Pulse Temperature Respirations BMI (Body Mass Index)          48 97.2  F (36.2  C) (Oral) 20 32.46 kg/m2         Blood Pressure from Last 3 Encounters:   03/09/18 110/72   03/08/18 160/79   02/26/18 124/55    Weight from Last 3 Encounters:   03/09/18 150 lb (68 kg)   03/08/18  149 lb (67.6 kg)   02/26/18 151 lb (68.5 kg)              Today, you had the following     No orders found for display         Today's Medication Changes          These changes are accurate as of 3/9/18 12:22 PM.  If you have any questions, ask your nurse or doctor.               These medicines have changed or have updated prescriptions.        Dose/Directions    irbesartan 300 MG tablet   Commonly known as:  AVAPRO   This may have changed:  See the new instructions.   Used for:  Essential hypertension with goal blood pressure less than 140/90   Changed by:  Pop Zapien MD        TAKE 1 TABLET EVERY DAY   Quantity:  90 tablet   Refills:  2       methimazole 5 MG tablet   Commonly known as:  TAPAZOLE   This may have changed:  when to take this   Used for:  Nontoxic multinodular goiter   Changed by:  Pop Zapien MD        Dose:  5 mg   Take 1 tablet (5 mg) by mouth daily   Quantity:  90 tablet   Refills:  1       * sertraline 25 MG tablet   Commonly known as:  ZOLOFT   This may have changed:  Another medication with the same name was changed. Make sure you understand how and when to take each.   Changed by:  Pop Zapien MD        Refills:  0       * sertraline 100 MG tablet   Commonly known as:  ZOLOFT   This may have changed:    - medication strength  - how much to take   Used for:  THERON (generalized anxiety disorder)   Changed by:  Pop Zapien MD        Dose:  100 mg   Take 1 tablet (100 mg) by mouth daily   Quantity:  90 tablet   Refills:  1       * Notice:  This list has 2 medication(s) that are the same as other medications prescribed for you. Read the directions carefully, and ask your doctor or other care provider to review them with you.      Stop taking these medicines if you haven't already. Please contact your care team if you have questions.     ferrous sulfate 325 (65 FE) MG tablet   Commonly known as:  IRON   Stopped by:  Pop Zapien  MD                Where to get your medicines      These medications were sent to [facility] [id number] [facility] [address]  [address] [place] [address]     Phone:  [phone]     irbesartan 300 MG tablet    methimazole 5 MG tablet    propranolol 60 MG 24 hr capsule    sertraline 100 MG tablet                Primary Care Provider Office Phone # Fax #    Pop Antonino Zapien [phone] [address] [place] [phone]        Equal Access to Services     [facility]: Hadii aad ku hadasho Sogabo, waaxda luqadaha, qaybta kaalmada adeurszulayacristian, maldonado seay . [facility] [phone].    ATENCIÓN: Si habla español, tiene a sierra disposición servicios gratuitos de asistencia lingüística. [facility] [phone].    We comply with applicable federal civil rights laws and [place] laws. We do not discriminate on the basis of race, color, national origin, age, disability, sex, sexual orientation, or gender identity.            Thank you!     Thank you for choosing [facility]  for your care. Our goal is always to provide you with excellent care. Hearing back from our patients is one way we can continue to improve our services. Please take a few minutes to complete the written survey that you may receive in the mail after your visit with us. Thank you!             Your Updated Medication List - Protect others around you: Learn how to safely use, store and throw away your medicines at www.disposemymeds.org.          This list is accurate as of 3/9/18 12:22 PM.  Always use your most recent med list.                   Brand Name Dispense Instructions for use Diagnosis    acetaminophen 650 MG 8 hour tablet     100 tablet    Take 650 mg by mouth every 8 hours as needed for mild pain or fever    Ascending cholangitis       acetaminophen-codeine 300-30 MG per tablet    TYLENOL #3    8 tablet  "   Take 1 tablet by mouth every 6 hours as needed for pain maximum 4 tablet(s) per day    Other microscopic hematuria       alendronate 70 MG tablet    FOSAMAX    12 tablet    TAKE 1 TABLET EVERY 7 DAYS AT LEAST 60 MIN BEFORE BREAKFAST AS DIRECTED \"SEE PACKAGE FOR ADDITIONAL INSTRUCTIONS\"    High risk medication use, Osteopenia       ASPIRIN PO      Take 81 mg by mouth At Bedtime        Calcium carb-Vitamin D 500 mg Tangirnaq-200 units 500-200 MG-UNIT per tablet    OSCAL with D;Oyster Shell Calcium     Take 1 tablet by mouth 2 times daily (with meals)        CRANBERRY CONCENTRATE PO      Take 1 capsule by mouth 2 times daily        cycloSPORINE 0.05 % ophthalmic emulsion    RESTASIS     Place 1 drop into both eyes 2 times daily        diazepam 2 MG tablet    VALIUM    20 tablet    Take 1 tablet (2 mg) by mouth every 12 hours as needed for anxiety or sleep    THERON (generalized anxiety disorder)       Fish Oil 500 MG Caps      Take 1 capsule by mouth 2 times daily        furosemide 20 MG tablet    LASIX    30 tablet    Take 0.5 tablets (10 mg) by mouth daily    Elevated troponin       irbesartan 300 MG tablet    AVAPRO    90 tablet    TAKE 1 TABLET EVERY DAY    Essential hypertension with goal blood pressure less than 140/90       lovastatin 40 MG tablet    MEVACOR    90 tablet    TAKE 1 TABLET AT BEDTIME (HYPERLIPIDEMIA LDL GOAL  BELOW 130)    Hyperlipidemia LDL goal <130       melatonin 3 MG tablet     90 tablet    Take 1 tablet (3 mg) by mouth nightly as needed for sleep    Insomnia       methimazole 5 MG tablet    TAPAZOLE    90 tablet    Take 1 tablet (5 mg) by mouth daily    Nontoxic multinodular goiter       nitroGLYcerin 0.4 MG sublingual tablet    NITROSTAT    25 tablet    For chest pain place 1 tablet under the tongue every 5 minutes for 3 doses. If symptoms persist 5 minutes after 1st dose call 911.    Elevated troponin       omeprazole 20 MG CR capsule    priLOSEC    180 capsule    Take 1 capsule (20 mg) by " mouth daily    Gastroesophageal reflux disease without esophagitis       polyethylene glycol Packet    MIRALAX/GLYCOLAX    7 packet    Take 17 g by mouth 2 times daily as needed for constipation    Other constipation       propranolol 60 MG 24 hr capsule    INDERAL LA    90 capsule    Take 1 capsule (60 mg) by mouth daily    Nontoxic multinodular goiter       * sertraline 25 MG tablet    ZOLOFT          * sertraline 100 MG tablet    ZOLOFT    90 tablet    Take 1 tablet (100 mg) by mouth daily    THERON (generalized anxiety disorder)       SYSTANE 0.4-0.3 % Soln ophthalmic solution   Generic drug:  polyethylene glycol 0.4%- propylene glycol 0.3%      Place 1 drop into both eyes 3 times daily as needed for dry eyes        * Notice:  This list has 2 medication(s) that are the same as other medications prescribed for you. Read the directions carefully, and ask your doctor or other care provider to review them with you.

## 2018-03-09 NOTE — PROGRESS NOTES
SUBJECTIVE:   Dimple Oviedo is a 84 year old female who presents to clinic today for the following health issues:      Diarrhea      Duration: on going    Description:       Consistency of stool: watery, runny and loose       Blood in stool: no        Number of loose stools past 24 hours: 1 time    Intensity:  mild    Accompanying signs and symptoms:       Fever: no        Nausea/vomitting: no        Abdominal pain: no        Weight loss: YES    History (recent antibiotics or travel/ill contacts/med changes/testing done): none    Precipitating or alleviating factors: None    Therapies tried and outcome: Imodium AD was effective    Been to urologist yesterday. Will be having another cystoscopy again. Still some blood in urine.     Still having shakiness - seeing neurologist.     Constipation. Uses ensure.     Still some hot and cold sweats.     Anxiety is still there. Better but still there. Still crying episodes.     Started iron in November. Still constipation.     Problem list and histories reviewed & adjusted, as indicated.  Additional history: as documented      Reviewed and updated as needed this visit by clinical staff  Tobacco  Allergies  Meds  Med Hx  Surg Hx  Fam Hx  Soc Hx      Reviewed and updated as needed this visit by Provider           Social History     Social History     Marital status:      Spouse name:      Number of children: 2     Years of education: N/A     Occupational History      Retired     Social History Main Topics     Smoking status: Never Smoker     Smokeless tobacco: Never Used     Alcohol use No      Comment: 6 times per year-one drink     Drug use: No     Sexual activity: Yes     Partners: Male     Other Topics Concern      Service No     Blood Transfusions No     Exercise Yes     curves     Seat Belt Yes     Self-Exams Yes     Parent/Sibling W/ Cabg, Mi Or Angioplasty Before 65f 55m? No     Social History Narrative    December 7, 2009    Balanced Diet -  Yes    Osteoporosis Prevention Measures - Dairy servings per day: 2 and Medication/Supplements (See current meds)    Regular Exercise -  Yes Describe curves, walking    Dental Exam - YES - Date: 10/2009    Eye Exam - YES - Date: 2008    Self Breast Exam - Yes    Abuse: Current or Past (Physical, Sexual or Emotional)- No    Do you feel safe in your environment - Yes    Guns stored in the home - No    Sunscreen used - Yes    Seatbelts used - Yes    Lipids -  YES - Date: 11/24/2009    Glucose -  YES - Date: 11/24/2009    Colon Cancer Screening - Colonoscopy 11/18/2005(date completed)    Hemoccults - YES - Date: 10/12/2004    Pap Test -  YES - Date: 09/22/2004    Do you have any concerns about STD's -  No    Mammography - YES - Date: 11/24/2009    DEXA - YES - Date: 11/20/2008    Immunizations reviewed and up to date - Yes    PAULETTE Spencer CMA                 Allergies   Allergen Reactions     No Known Drug Allergies      Patient Active Problem List   Diagnosis     Esophageal reflux     restless leg     heat intolerance     Goiter     Disorder of bone and cartilage     Other psoriasis     perirectal cyst     Malignant melanoma of skin of trunk, except scrotum (H)     Undiagnosed cardiac murmurs     Nontoxic multinodular goiter     Hyperlipidemia LDL goal <130     Hypertension goal BP (blood pressure) < 140/90     Urinary incontinence     Osteoarthritis of left knee     Hip arthritis     Polymyalgia rheumatica (H)     High risk medication use     Shoulder pain     Sepsis (H)     Impaired fasting blood sugar     Chronic bilateral low back pain without sciatica     Obesity, unspecified obesity severity, unspecified obesity type     Iron deficiency anemia, unspecified iron deficiency anemia type     NSTEMI (non-ST elevated myocardial infarction) (H)     Stress-induced cardiomyopathy     A-fib (H)     Atrial fibrillation, unspecified type (H)     Anxiety     Chronic systolic heart failure (H)     Reviewed medications, social  history and  past medical and surgical history.    Review of system: for general, respiratory, CVS, GI and psychiatry negative except for noted above.     EXAM:  /72  Pulse (!) 48  Temp 97.2  F (36.2  C) (Oral)  Resp 20  Wt 150 lb (68 kg)  BMI 32.46 kg/m2  Constitutional: healthy, alert and no distress   Psychiatric: mentation appears normal and affect normal/bright  Cardiovascular: RRR. No murmurs,  Respiratory: negative, Lungs clear. No crackles or wheezing. No tachypnea.       ASSESSMENT / PLAN:  (F41.1) THERON (generalized anxiety disorder)  Comment: Patient is still having multiple crying spells and it is reasonable to go up on the dose of her Zoloft 100 mg.  She is tolerating it very well and she is noticing improvement in her anxiety with it.  Plan: sertraline (ZOLOFT) 100 MG tablet              (I10) Essential hypertension with goal blood pressure less than 140/90  Comment: She is doing with current blood pressure medication and it is reasonable to continue her on the same.  Plan: irbesartan (AVAPRO) 300 MG tablet             (E04.2) Nontoxic multinodular goiter  Comment: Her methimazole dose has been decreased to 5 mg as her hyperthyroidism symptoms are better controlled.  As per endocrine her tremors and anxiety symptoms are not related to her thyroid.   Plan: propranolol (INDERAL LA) 60 MG 24 hr capsule,         methimazole (TAPAZOLE) 5 MG tablet           Iron deficiency anemia  Comment: Patient is still having constipation and she is frustrated with her symptoms.  She has been on iron for more than 5 months and it is reasonable to just stop iron and see how she does.  We talk about using fiber in her diet.  We will recheck her iron 3 months from now and see if it is dropping down again or not.    Patient Instructions   Stop iron pill    Go up on the dose of sertaline (zoloft)    Follow up in 3 months.

## 2018-03-10 LAB
BACTERIA SPEC CULT: ABNORMAL
BACTERIA SPEC CULT: ABNORMAL
SPECIMEN SOURCE: ABNORMAL

## 2018-03-12 DIAGNOSIS — N39.0 URINARY TRACT INFECTION: Primary | ICD-10-CM

## 2018-03-12 RX ORDER — SULFAMETHOXAZOLE/TRIMETHOPRIM 800-160 MG
1 TABLET ORAL 2 TIMES DAILY
Qty: 14 TABLET | Refills: 0 | Status: SHIPPED | OUTPATIENT
Start: 2018-03-12 | End: 2018-03-19

## 2018-03-12 NOTE — PROGRESS NOTES
Notified Nell (daughter-in-law) of urinary tract infection.   Medications to go to [facility]   Nell verbalized understanding    Cristiana Correa, RN   Care Coordinator Urology

## 2018-03-14 ENCOUNTER — TELEPHONE (OUTPATIENT)
Dept: FAMILY MEDICINE | Facility: CLINIC | Age: 83
End: 2018-03-14

## 2018-03-14 ENCOUNTER — HOSPITAL ENCOUNTER (OUTPATIENT)
Facility: CLINIC | Age: 83
Setting detail: OBSERVATION
Discharge: HOME | End: 2018-03-15
Attending: EMERGENCY MEDICINE | Admitting: EMERGENCY MEDICINE
Payer: MEDICARE

## 2018-03-14 ENCOUNTER — APPOINTMENT (OUTPATIENT)
Dept: GENERAL RADIOLOGY | Facility: CLINIC | Age: 83
End: 2018-03-14
Attending: EMERGENCY MEDICINE
Payer: MEDICARE

## 2018-03-14 ENCOUNTER — APPOINTMENT (OUTPATIENT)
Dept: CARDIOLOGY | Facility: CLINIC | Age: 83
End: 2018-03-14
Payer: MEDICARE

## 2018-03-14 DIAGNOSIS — R07.89 CHEST PRESSURE: ICD-10-CM

## 2018-03-14 DIAGNOSIS — I50.22 CHRONIC SYSTOLIC HEART FAILURE (H): ICD-10-CM

## 2018-03-14 DIAGNOSIS — R19.7 DIARRHEA, UNSPECIFIED TYPE: ICD-10-CM

## 2018-03-14 LAB
ANION GAP SERPL CALCULATED.3IONS-SCNC: 12 MMOL/L (ref 3–14)
BASOPHILS # BLD AUTO: 0 10E9/L (ref 0–0.2)
BASOPHILS NFR BLD AUTO: 0.4 %
BUN SERPL-MCNC: 11 MG/DL (ref 7–30)
CALCIUM SERPL-MCNC: 9 MG/DL (ref 8.5–10.1)
CHLORIDE SERPL-SCNC: 104 MMOL/L (ref 94–109)
CO2 SERPL-SCNC: 21 MMOL/L (ref 20–32)
CREAT SERPL-MCNC: 0.96 MG/DL (ref 0.52–1.04)
D DIMER PPP FEU-MCNC: 0.5 UG/ML FEU (ref 0–0.5)
DIFFERENTIAL METHOD BLD: NORMAL
EOSINOPHIL # BLD AUTO: 0.1 10E9/L (ref 0–0.7)
EOSINOPHIL NFR BLD AUTO: 1 %
ERYTHROCYTE [DISTWIDTH] IN BLOOD BY AUTOMATED COUNT: 14.8 % (ref 10–15)
GFR SERPL CREATININE-BSD FRML MDRD: 55 ML/MIN/1.7M2
GLUCOSE SERPL-MCNC: 104 MG/DL (ref 70–99)
HCT VFR BLD AUTO: 39.3 % (ref 35–47)
HGB BLD-MCNC: 13.1 G/DL (ref 11.7–15.7)
IMM GRANULOCYTES # BLD: 0 10E9/L (ref 0–0.4)
IMM GRANULOCYTES NFR BLD: 0.1 %
INTERPRETATION ECG - MUSE: NORMAL
LYMPHOCYTES # BLD AUTO: 1.7 10E9/L (ref 0.8–5.3)
LYMPHOCYTES NFR BLD AUTO: 22.2 %
MCH RBC QN AUTO: 30.7 PG (ref 26.5–33)
MCHC RBC AUTO-ENTMCNC: 33.3 G/DL (ref 31.5–36.5)
MCV RBC AUTO: 92 FL (ref 78–100)
MONOCYTES # BLD AUTO: 0.7 10E9/L (ref 0–1.3)
MONOCYTES NFR BLD AUTO: 8.6 %
NEUTROPHILS # BLD AUTO: 5.3 10E9/L (ref 1.6–8.3)
NEUTROPHILS NFR BLD AUTO: 67.7 %
NRBC # BLD AUTO: 0 10*3/UL
NRBC BLD AUTO-RTO: 0 /100
NT-PROBNP SERPL-MCNC: 757 PG/ML (ref 0–1800)
PLATELET # BLD AUTO: 346 10E9/L (ref 150–450)
POTASSIUM SERPL-SCNC: 4 MMOL/L (ref 3.4–5.3)
RBC # BLD AUTO: 4.27 10E12/L (ref 3.8–5.2)
SODIUM SERPL-SCNC: 137 MMOL/L (ref 133–144)
TROPONIN I BLD-MCNC: 0 UG/L (ref 0–0.1)
TROPONIN I SERPL-MCNC: <0.015 UG/L (ref 0–0.04)
TSH SERPL DL<=0.005 MIU/L-ACNC: 1.94 MU/L (ref 0.4–4)
WBC # BLD AUTO: 7.8 10E9/L (ref 4–11)

## 2018-03-14 PROCEDURE — 84484 ASSAY OF TROPONIN QUANT: CPT | Performed by: NURSE PRACTITIONER

## 2018-03-14 PROCEDURE — 93321 DOPPLER ECHO F-UP/LMTD STD: CPT | Mod: 26 | Performed by: INTERNAL MEDICINE

## 2018-03-14 PROCEDURE — 25500064 ZZH RX 255 OP 636: Performed by: EMERGENCY MEDICINE

## 2018-03-14 PROCEDURE — 71046 X-RAY EXAM CHEST 2 VIEWS: CPT

## 2018-03-14 PROCEDURE — 83880 ASSAY OF NATRIURETIC PEPTIDE: CPT | Performed by: EMERGENCY MEDICINE

## 2018-03-14 PROCEDURE — G0378 HOSPITAL OBSERVATION PER HR: HCPCS

## 2018-03-14 PROCEDURE — 85025 COMPLETE CBC W/AUTO DIFF WBC: CPT | Performed by: EMERGENCY MEDICINE

## 2018-03-14 PROCEDURE — 36415 COLL VENOUS BLD VENIPUNCTURE: CPT | Performed by: NURSE PRACTITIONER

## 2018-03-14 PROCEDURE — 99220 ZZC INITIAL OBSERVATION CARE,LEVL III: CPT | Mod: Z6 | Performed by: NURSE PRACTITIONER

## 2018-03-14 PROCEDURE — 84443 ASSAY THYROID STIM HORMONE: CPT | Performed by: EMERGENCY MEDICINE

## 2018-03-14 PROCEDURE — 93005 ELECTROCARDIOGRAM TRACING: CPT | Mod: 59

## 2018-03-14 PROCEDURE — 93325 DOPPLER ECHO COLOR FLOW MAPG: CPT | Mod: 26 | Performed by: INTERNAL MEDICINE

## 2018-03-14 PROCEDURE — 85379 FIBRIN DEGRADATION QUANT: CPT | Performed by: EMERGENCY MEDICINE

## 2018-03-14 PROCEDURE — 84484 ASSAY OF TROPONIN QUANT: CPT | Performed by: EMERGENCY MEDICINE

## 2018-03-14 PROCEDURE — 80048 BASIC METABOLIC PNL TOTAL CA: CPT | Performed by: EMERGENCY MEDICINE

## 2018-03-14 PROCEDURE — 93010 ELECTROCARDIOGRAM REPORT: CPT | Performed by: INTERNAL MEDICINE

## 2018-03-14 PROCEDURE — 25000128 H RX IP 250 OP 636: Performed by: NURSE PRACTITIONER

## 2018-03-14 PROCEDURE — 93010 ELECTROCARDIOGRAM REPORT: CPT | Mod: Z6 | Performed by: EMERGENCY MEDICINE

## 2018-03-14 PROCEDURE — 84484 ASSAY OF TROPONIN QUANT: CPT | Mod: 91 | Performed by: EMERGENCY MEDICINE

## 2018-03-14 PROCEDURE — 93005 ELECTROCARDIOGRAM TRACING: CPT | Performed by: EMERGENCY MEDICINE

## 2018-03-14 PROCEDURE — 99285 EMERGENCY DEPT VISIT HI MDM: CPT | Mod: 25 | Performed by: EMERGENCY MEDICINE

## 2018-03-14 PROCEDURE — 25000132 ZZH RX MED GY IP 250 OP 250 PS 637: Mod: GY | Performed by: NURSE PRACTITIONER

## 2018-03-14 PROCEDURE — A9270 NON-COVERED ITEM OR SERVICE: HCPCS | Mod: GY | Performed by: NURSE PRACTITIONER

## 2018-03-14 PROCEDURE — 93308 TTE F-UP OR LMTD: CPT | Mod: 26 | Performed by: INTERNAL MEDICINE

## 2018-03-14 PROCEDURE — 84484 ASSAY OF TROPONIN QUANT: CPT

## 2018-03-14 RX ORDER — LIDOCAINE 40 MG/G
CREAM TOPICAL
Status: DISCONTINUED | OUTPATIENT
Start: 2018-03-14 | End: 2018-03-15 | Stop reason: HOSPADM

## 2018-03-14 RX ORDER — SIMVASTATIN 20 MG
20 TABLET ORAL AT BEDTIME
Status: DISCONTINUED | OUTPATIENT
Start: 2018-03-14 | End: 2018-03-15 | Stop reason: HOSPADM

## 2018-03-14 RX ORDER — ALUMINA, MAGNESIA, AND SIMETHICONE 2400; 2400; 240 MG/30ML; MG/30ML; MG/30ML
30 SUSPENSION ORAL EVERY 4 HOURS PRN
Status: DISCONTINUED | OUTPATIENT
Start: 2018-03-14 | End: 2018-03-15 | Stop reason: HOSPADM

## 2018-03-14 RX ORDER — NITROGLYCERIN 0.4 MG/1
0.4 TABLET SUBLINGUAL EVERY 5 MIN PRN
Status: DISCONTINUED | OUTPATIENT
Start: 2018-03-14 | End: 2018-03-15 | Stop reason: HOSPADM

## 2018-03-14 RX ORDER — IRBESARTAN 300 MG/1
300 TABLET ORAL DAILY
Status: DISCONTINUED | OUTPATIENT
Start: 2018-03-15 | End: 2018-03-15 | Stop reason: HOSPADM

## 2018-03-14 RX ORDER — SODIUM CHLORIDE 9 MG/ML
INJECTION, SOLUTION INTRAVENOUS CONTINUOUS
Status: DISCONTINUED | OUTPATIENT
Start: 2018-03-14 | End: 2018-03-14

## 2018-03-14 RX ORDER — ACETAMINOPHEN 325 MG/1
650 TABLET ORAL EVERY 4 HOURS PRN
Status: DISCONTINUED | OUTPATIENT
Start: 2018-03-14 | End: 2018-03-15 | Stop reason: HOSPADM

## 2018-03-14 RX ORDER — ASPIRIN 81 MG/1
162 TABLET, CHEWABLE ORAL ONCE
Status: DISCONTINUED | OUTPATIENT
Start: 2018-03-14 | End: 2018-03-15 | Stop reason: HOSPADM

## 2018-03-14 RX ORDER — SULFAMETHOXAZOLE/TRIMETHOPRIM 800-160 MG
1 TABLET ORAL 2 TIMES DAILY
Status: DISCONTINUED | OUTPATIENT
Start: 2018-03-14 | End: 2018-03-15 | Stop reason: HOSPADM

## 2018-03-14 RX ORDER — METHIMAZOLE 5 MG/1
5 TABLET ORAL DAILY
Status: DISCONTINUED | OUTPATIENT
Start: 2018-03-15 | End: 2018-03-15 | Stop reason: HOSPADM

## 2018-03-14 RX ORDER — NALOXONE HYDROCHLORIDE 0.4 MG/ML
.1-.4 INJECTION, SOLUTION INTRAMUSCULAR; INTRAVENOUS; SUBCUTANEOUS
Status: DISCONTINUED | OUTPATIENT
Start: 2018-03-14 | End: 2018-03-15 | Stop reason: HOSPADM

## 2018-03-14 RX ORDER — PROPRANOLOL HCL 60 MG
60 CAPSULE, EXTENDED RELEASE 24HR ORAL DAILY
Status: DISCONTINUED | OUTPATIENT
Start: 2018-03-15 | End: 2018-03-15 | Stop reason: HOSPADM

## 2018-03-14 RX ORDER — FUROSEMIDE 20 MG/1
10 TABLET ORAL DAILY
Status: DISCONTINUED | OUTPATIENT
Start: 2018-03-15 | End: 2018-03-15 | Stop reason: HOSPADM

## 2018-03-14 RX ORDER — ASPIRIN 81 MG/1
81 TABLET ORAL DAILY
Status: DISCONTINUED | OUTPATIENT
Start: 2018-03-15 | End: 2018-03-15 | Stop reason: HOSPADM

## 2018-03-14 RX ORDER — ACETAMINOPHEN 650 MG/1
650 SUPPOSITORY RECTAL EVERY 4 HOURS PRN
Status: DISCONTINUED | OUTPATIENT
Start: 2018-03-14 | End: 2018-03-15 | Stop reason: HOSPADM

## 2018-03-14 RX ORDER — PROPRANOLOL HCL 60 MG
60 CAPSULE, EXTENDED RELEASE 24HR ORAL DAILY
Status: DISCONTINUED | OUTPATIENT
Start: 2018-03-15 | End: 2018-03-14

## 2018-03-14 RX ADMIN — HUMAN ALBUMIN MICROSPHERES AND PERFLUTREN 6 ML: 10; .22 INJECTION, SOLUTION INTRAVENOUS at 13:46

## 2018-03-14 RX ADMIN — SIMVASTATIN 20 MG: 20 TABLET, FILM COATED ORAL at 20:06

## 2018-03-14 RX ADMIN — SULFAMETHOXAZOLE AND TRIMETHOPRIM 1 TABLET: 800; 160 TABLET ORAL at 20:06

## 2018-03-14 RX ADMIN — SODIUM CHLORIDE: 9 INJECTION, SOLUTION INTRAVENOUS at 20:07

## 2018-03-14 ASSESSMENT — ENCOUNTER SYMPTOMS
SHORTNESS OF BREATH: 1
FEVER: 0
VOMITING: 0
NAUSEA: 0
COUGH: 0

## 2018-03-14 NOTE — IP AVS SNAPSHOT
MRN:[id number]                      After Visit Summary   3/14/2018    Dimple Oviedo    MRN: [id number]           Thank you!     Thank you for choosing [facility] for your care. Our goal is always to provide you with excellent care. Hearing back from our patients is one way we can continue to improve our services. Please take a few minutes to complete the written survey that you may receive in the mail after you visit with us. Thank you!        Patient Information     Date Of Birth          6/23/1933        Designated Caregiver       Most Recent Value    Caregiver    Will someone help with your care after discharge? yes    Name of designated caregiver Issa    Phone number of caregiver [phone]    Caregiver address [address] [place] [address]      About your hospital stay     You were admitted on:  March 14, 2018 You last received care in the:  Unit 6D Observation [facility]    You were discharged on:  March 15, 2018        Reason for your hospital stay       Chest pain  Dyspnea  Diarrhea                  Who to Call     For medical emergencies, please call 911.  For non-urgent questions about your medical care, please call your primary care provider or clinic, [phone]          Attending Provider     Provider Specialty    Dhara Major MD Emergency Medicine    [facility]       Primary Care Provider Office Phone # Fax #    Pop Antonino Zapien [phone]       When to contact your care team       Please go to your nearest emergency room If you were to have a change in the type of chest pain i.e more severe, lasting longer or radiating to your shoulder, arm, neck, jaw or back, shortness of breath or increased pain with breathing, coughing up blood, feel dizzy or lightheaded, or notice swelling in one leg.                  After Care Instructions     Activity       Your activity upon discharge: activity as  tolerated            Diet       Follow this diet upon discharge: Regular            Discharge Instructions       You were admitted to the observation unit for evaluation of your chest pain.  Your EKG was normal. Troponins (a lab test to check if there was damage to the heart tissue) were checked and these were negative. Chest x-ray was normal.  You underwent an echocardiogram test and this was normal. The chest pain you came into the emergency room for does not appear to be cardiac chest pain. A referral for gastropathology consult was placed for you to further investigate your chronic diarrhea. In the meantime, continue with the metamucil x 3 daily as directed by your primary. Avoid spicy foods and minimize taking dairy products. I recommend continuing your home medications and it will be very important to follow up with your primary care doctor early next week or sooner if your symptoms worsen.                  Follow-up Appointments     Follow Up and recommended labs and tests       Gasteroentrology referral in place. Please follow up on appointment date/time  [address] [place] [address]   Appointments:   [phone]                  Your next 10 appointments already scheduled     Mar 21, 2018 11:30 AM CDT   (Arrive by 11:15 AM)   New Patient Visit with Mushtaq Fajardo MD   [facility] ([facility])    [address] [place] [address]  3rd [facility] [address]   [phone]            Apr 26, 2018 10:30 AM CDT   (Arrive by 10:15 AM)   Cystoscopy with Kenna La MD   [facility] ([facility])    [address] [place] [address]  4th [facility] [address] [phone]            May 21, 2018 12:30 PM CDT   Lab with  LAB   [facility] ([facility])    [address] [place] [address]  1st [facility] [address]   [phone]            May 21, 2018  1:00  PM CDT   (Arrive by 12:45 PM)   CORE RETURN with NELSON Smart CNP   [facility] ([facility])    [address]  [address]  [facility] [id number] [phone]            May 29, 2018 10:00 AM CDT   (Arrive by 9:45 AM)   RETURN ENDOCRINE with Dhara Meza MD   [facility] ([facility])    [address]  3rd Floor  [facility] [id number] [phone]            Jul 05, 2018  1:30 PM CDT   (Arrive by 1:15 PM)   NEW ARRHYTHMIA with Butch Griffith MD   [facility] ([facility])    [address]  [address]  [facility] [id number] [phone]              Additional Services     GASTROENTEROLOGY ADULT REF CONSULT ONLY       Preferred Location: [facility] [phone]      Please be aware that coverage of these services is subject to the terms and limitations of your health insurance plan.  Call member services at your health plan with any benefit or coverage questions.  Any procedures must be performed at a [facility] facility OR coordinated by your clinic's referral office.    Please bring the following with you to your appointment:    (1) Any X-Rays, CTs or MRIs which have been performed.  Contact the facility where they were done to arrange for  prior to your scheduled appointment.    (2) List of current medications   (3) This referral request   (4) Any documents/labs given to you for this referral            GASTROENTEROLOGY ADULT REF PROCEDURE ONLY       Last Lab Result: Creatinine (mg/dL)       Date                     Value                 03/15/2018               1.00             ----------  There is no height or weight on file to calculate BMI.     Needed:  No  Language:  English    Patient will be contacted to schedule procedure.     Please be aware that coverage of these services is subject to the terms and limitations of your health  "insurance plan.  Call member services at your health plan with any benefit or coverage questions.  Any procedures must be performed at a [facility] facility OR coordinated by your clinic's referral office.    Please bring the following with you to your appointment:    (1) Any X-Rays, CTs or MRIs which have been performed.  Contact the facility where they were done to arrange for  prior to your scheduled appointment.    (2) List of current medications   (3) This referral request   (4) Any documents/labs given to you for this referral                  Pending Results     Date and Time Order Name Status Description    3/14/2018 1716 EKG 12-lead, tracing only Preliminary             Statement of Approval     Ordered          03/15/18 1143  I have reviewed and agree with all the recommendations and orders detailed in this document.  EFFECTIVE NOW     Approved and electronically signed by:  Mile Gaona PA-C             Admission Information     Date & Time Department Dept. Phone    3/14/2018 Unit 6D Observation [facility] [phone]      Your Vitals Were     Blood Pressure Pulse Temperature Respirations Height Pulse Oximetry    171/87 (BP Location: Right arm) 48 97.7  F (36.5  C) (Oral) 18 1.448 m (4' 9\") 97%      MyChart Information     [facility] gives you secure access to your electronic health record. If you see a primary care provider, you can also send messages to your care team and make appointments. If you have questions, please call your primary care clinic.  If you do not have a primary care provider, please call [phone] and they will assist you.        Care EveryWhere ID     This is your Care EveryWhere ID. This could be used by other organizations to access your [facility] medical records  [id number]        Equal Access to Services     [facility] MICHAEL : Aleja Pollack, nadia barnard, maldonado lui. So [facility] " "[phone].    ATENCIÓN: Si soni alanis, tiene a sierra disposición servicios gratuitos de asistencia lingüística. Al leon [phone].    We comply with applicable federal civil rights laws and [place] laws. We do not discriminate on the basis of race, color, national origin, age, disability, sex, sexual orientation, or gender identity.               Review of your medicines      CONTINUE these medicines which have NOT CHANGED        Dose / Directions    acetaminophen 650 MG 8 hour tablet   Used for:  Ascending cholangitis        Dose:  650 mg   Take 650 mg by mouth every 8 hours as needed for mild pain or fever   Quantity:  100 tablet   Refills:  0       acetaminophen-codeine 300-30 MG per tablet   Commonly known as:  TYLENOL #3   Used for:  Other microscopic hematuria        Dose:  1 tablet   Take 1 tablet by mouth every 6 hours as needed for pain maximum 4 tablet(s) per day   Quantity:  8 tablet   Refills:  0       alendronate 70 MG tablet   Commonly known as:  FOSAMAX   Used for:  High risk medication use, Osteopenia        TAKE 1 TABLET EVERY 7 DAYS AT LEAST 60 MIN BEFORE BREAKFAST AS DIRECTED \"SEE PACKAGE FOR ADDITIONAL INSTRUCTIONS\"   Quantity:  12 tablet   Refills:  2       ASPIRIN PO        Dose:  81 mg   Take 81 mg by mouth At Bedtime   Refills:  0       Calcium carb-Vitamin D 500 mg [place]ona-200 units 500-200 MG-UNIT per tablet   Commonly known as:  OSCAL with D;Oyster Shell Calcium        Dose:  1 tablet   Take 1 tablet by mouth 2 times daily (with meals)   Refills:  0       CRANBERRY CONCENTRATE PO        Dose:  1 capsule   Take 1 capsule by mouth 2 times daily   Refills:  0       cycloSPORINE 0.05 % ophthalmic emulsion   Commonly known as:  RESTASIS        Dose:  1 drop   Place 1 drop into both eyes 2 times daily   Refills:  0       diazepam 2 MG tablet   Commonly known as:  VALIUM   Used for:  THERON (generalized anxiety disorder)        Dose:  2 mg   Take 1 tablet (2 mg) by mouth every 12 hours as " needed for anxiety or sleep   Quantity:  20 tablet   Refills:  0       Fish Oil 500 MG Caps        Dose:  1 capsule   Take 1 capsule by mouth 2 times daily   Refills:  0       irbesartan 300 MG tablet   Commonly known as:  AVAPRO   Used for:  Essential hypertension with goal blood pressure less than 140/90        TAKE 1 TABLET EVERY DAY   Quantity:  90 tablet   Refills:  2       lovastatin 40 MG tablet   Commonly known as:  MEVACOR   Used for:  Hyperlipidemia LDL goal <130        TAKE 1 TABLET AT BEDTIME (HYPERLIPIDEMIA LDL GOAL  BELOW 130)   Quantity:  90 tablet   Refills:  2       melatonin 3 MG tablet   Used for:  Insomnia        Dose:  3 mg   Take 1 tablet (3 mg) by mouth nightly as needed for sleep   Quantity:  90 tablet   Refills:  0       methimazole 5 MG tablet   Commonly known as:  TAPAZOLE   Used for:  Nontoxic multinodular goiter        Dose:  5 mg   Take 1 tablet (5 mg) by mouth daily   Quantity:  90 tablet   Refills:  1       nitroGLYcerin 0.4 MG sublingual tablet   Commonly known as:  NITROSTAT   Used for:  Elevated troponin        For chest pain place 1 tablet under the tongue every 5 minutes for 3 doses. If symptoms persist 5 minutes after 1st dose call 911.   Quantity:  25 tablet   Refills:  3       omeprazole 20 MG CR capsule   Commonly known as:  priLOSEC   Used for:  Gastroesophageal reflux disease without esophagitis        Dose:  20 mg   Take 1 capsule (20 mg) by mouth daily   Quantity:  180 capsule   Refills:  3       propranolol 60 MG 24 hr capsule   Commonly known as:  INDERAL LA   Used for:  Nontoxic multinodular goiter        Dose:  60 mg   Take 1 capsule (60 mg) by mouth daily   Quantity:  90 capsule   Refills:  2       sertraline 100 MG tablet   Commonly known as:  ZOLOFT   Used for:  THERON (generalized anxiety disorder)        Dose:  100 mg   Take 1 tablet (100 mg) by mouth daily   Quantity:  90 tablet   Refills:  1       sulfamethoxazole-trimethoprim 800-160 MG per tablet   Commonly  "known as:  BACTRIM DS   Used for:  Urinary tract infection        Dose:  1 tablet   Take 1 tablet by mouth 2 times daily for 7 days   Quantity:  14 tablet   Refills:  0       SYSTANE 0.4-0.3 % Soln ophthalmic solution   Generic drug:  polyethylene glycol 0.4%- propylene glycol 0.3%        Dose:  1 drop   Place 1 drop into both eyes 3 times daily as needed for dry eyes   Refills:  0         STOP taking     furosemide 20 MG tablet   Commonly known as:  LASIX           polyethylene glycol Packet   Commonly known as:  MIRALAX/GLYCOLAX                    Protect others around you: Learn how to safely use, store and throw away your medicines at www.disposemymeds.org.             Medication List: This is a list of all your medications and when to take them. Check marks below indicate your daily home schedule. Keep this list as a reference.      Medications           Morning Afternoon Evening Bedtime As Needed    acetaminophen 650 MG 8 hour tablet   Take 650 mg by mouth every 8 hours as needed for mild pain or fever                                acetaminophen-codeine 300-30 MG per tablet   Commonly known as:  TYLENOL #3   Take 1 tablet by mouth every 6 hours as needed for pain maximum 4 tablet(s) per day                                alendronate 70 MG tablet   Commonly known as:  FOSAMAX   TAKE 1 TABLET EVERY 7 DAYS AT LEAST 60 MIN BEFORE BREAKFAST AS DIRECTED \"SEE PACKAGE FOR ADDITIONAL INSTRUCTIONS\"                                ASPIRIN PO   Take 81 mg by mouth At Bedtime   Last time this was given:  81 mg on 3/15/2018  8:01 AM                                Calcium carb-Vitamin D 500 mg [place]-200 units 500-200 MG-UNIT per tablet   Commonly known as:  OSCAL with D;Oyster Shell Calcium   Take 1 tablet by mouth 2 times daily (with meals)                                CRANBERRY CONCENTRATE PO   Take 1 capsule by mouth 2 times daily                                cycloSPORINE 0.05 % ophthalmic emulsion   Commonly known as: "  RESTASIS   Place 1 drop into both eyes 2 times daily                                diazepam 2 MG tablet   Commonly known as:  VALIUM   Take 1 tablet (2 mg) by mouth every 12 hours as needed for anxiety or sleep                                Fish Oil 500 MG Caps   Take 1 capsule by mouth 2 times daily                                irbesartan 300 MG tablet   Commonly known as:  AVAPRO   TAKE 1 TABLET EVERY DAY   Last time this was given:  300 mg on 3/15/2018  8:01 AM                                lovastatin 40 MG tablet   Commonly known as:  MEVACOR   TAKE 1 TABLET AT BEDTIME (HYPERLIPIDEMIA LDL GOAL  BELOW 130)                                melatonin 3 MG tablet   Take 1 tablet (3 mg) by mouth nightly as needed for sleep   Last time this was given:  10 mg on 3/15/2018  1:09 AM                                methimazole 5 MG tablet   Commonly known as:  TAPAZOLE   Take 1 tablet (5 mg) by mouth daily   Last time this was given:  5 mg on 3/15/2018  8:01 AM                                nitroGLYcerin 0.4 MG sublingual tablet   Commonly known as:  NITROSTAT   For chest pain place 1 tablet under the tongue every 5 minutes for 3 doses. If symptoms persist 5 minutes after 1st dose call 911.                                omeprazole 20 MG CR capsule   Commonly known as:  priLOSEC   Take 1 capsule (20 mg) by mouth daily   Last time this was given:  20 mg on 3/15/2018  7:12 AM                                propranolol 60 MG 24 hr capsule   Commonly known as:  INDERAL LA   Take 1 capsule (60 mg) by mouth daily   Last time this was given:  60 mg on 3/15/2018 10:48 AM                                sertraline 100 MG tablet   Commonly known as:  ZOLOFT   Take 1 tablet (100 mg) by mouth daily   Last time this was given:  100 mg on 3/15/2018  8:01 AM                                sulfamethoxazole-trimethoprim 800-160 MG per tablet   Commonly known as:  BACTRIM DS   Take 1 tablet by mouth 2 times daily for 7 days   Last time  this was given:  1 tablet on 3/15/2018  8:01 AM                                SYSTANE 0.4-0.3 % Soln ophthalmic solution   Place 1 drop into both eyes 3 times daily as needed for dry eyes   Generic drug:  polyethylene glycol 0.4%- propylene glycol 0.3%

## 2018-03-14 NOTE — TELEPHONE ENCOUNTER
Consent to communicate with [facility] on file.    Writer called [facility] and reviewed Dr. Zapien's message per below regarding usual process when patient is being admitted.    [facility] verbalized understanding.    Writer notes patient in ER with chest pain today.      SHASHANK MurrellN, RN

## 2018-03-14 NOTE — ED PROVIDER NOTES
History     Chief Complaint   Patient presents with     Chest Pain     HPI  Dimple Oviedo is a 84 year old female with a history of HTN, MI (12/2017), cardiomyopathy, and GERD who presents to the Emergency Department via EMS for evaluation of chest pain.  Patient reports that she started having chest heaviness and shortness of breath today at about 9:00 AM.  She was given aspirin and nitroglycerin by EMS and notes that her chest heaviness has improved now, but she is still having some shortness of breath.  Shortness of breath improves when she is sitting up.  She denies any nausea, vomiting, fever, or cough. Patient reports that her symptoms now are similar to those she had in December 2017 with her MI.  At that time she had an angiogram and was found to have no blockages.  Patient denies a history of PE/DVT.  She has had no recent travel. Patient has no history of lung disease and does not use home O2. She has had diarrhea for the past 4 months and is currently being evaluated for this as an outpatient. She is also being evaluated as an outpatient by Urology for hematuria.  She has been having some tremors recently and has an upcoming neurology appointment for evaluation of this.  Echocardiogram done December 13, 2017 showed global akinesis with the reduced EF.  At that time coronary angiogram revealed no evidence of coronary disease and it was thought that she had stress-induced cardiomyopathy.    Coronary angiogram (12/13/2017):  -Both coronary arteries that arise from their respective cusps.  -Dominance: Left  -LM has no evidence of coronary artery disease.  -LAD: Type 3 (LAD supplies the entire apex).  LAD gives rise to the septal perforators, D1, D2, and D3.  No angiographic evidence of disease.  -LCX gives rise to OM1, OM2, OM3. LPDA. No angiographic evidence of disease.  -RCA (nondominant), small in calibre, no evidence of disease.    Echocardiogram (01/16/2018)  Interpretation Summary  Left ventricular  function is normal.The EF is > 65%.  The inferior vena cava was normal in size with preserved respiratory  variability.  No pericardial effusion is present.  The left ventricular function has improved.    Past Medical History:   Diagnosis Date     Calculus of kidney      Esophageal reflux      GERD (gastroesophageal reflux disease)      Hyperlipidemia LDL goal <130 5/9/2010     Malignant melanoma of skin of trunk, except scrotum (H)      Nonspecific abnormal finding     has living will 2004 -      Nontoxic multinodular goiter     no further eval /tx rec per pt     Osteopenia      Other psoriasis      Personal history of colonic polyps      PMR (polymyalgia rheumatica) (H)      Stress-induced cardiomyopathy      Undiagnosed cardiac murmurs      Unspecified constipation      Unspecified essential hypertension        Past Surgical History:   Procedure Laterality Date     C NONSPECIFIC PROCEDURE  2005    colonoscopy polyp repeat 2010     CYSTOSCOPY, BIOPSY BLADDER, COMBINED N/A 2/19/2018    Procedure: COMBINED CYSTOSCOPY, BIOPSY BLADDER;  Cystoscopy, Bladder Biopsy;  Surgeon: Kenna La MD;  Location: [facility] OR     ENDOSCOPIC ULTRASOUND LOWER GASTROINTESTIONAL TRACT (GI) N/A 10/30/2015    Procedure: ENDOSCOPIC ULTRASOUND LOWER GASTROINTESTIONAL TRACT (GI);  Surgeon: Daniel Jean-Baptiste MD;  Location: [facility] OR     LAPAROSCOPIC CHOLECYSTECTOMY WITH CHOLANGIOGRAMS N/A 11/1/2015    Procedure: LAPAROSCOPIC CHOLECYSTECTOMY WITH CHOLANGIOGRAMS;  Surgeon: Tonie Warren MD;  Location: [facility] OR     SURGICAL HISTORY OF -   1996    malignant melanoma     SURGICAL HISTORY OF -   1968    thyroid nodule     SURGICAL HISTORY OF -       D & C       Family History   Problem Relation Age of Onset     CANCER Father      dec - esophageal and laryngeal     HEART DISEASE Mother      Respiratory Mother      dec       Social History   Substance Use Topics     Smoking status: Never Smoker     Smokeless tobacco: Never Used      "Alcohol use No      Comment: 6 times per year-one drink       No current facility-administered medications for this encounter.      Current Outpatient Prescriptions   Medication     sulfamethoxazole-trimethoprim (BACTRIM DS) 800-160 MG per tablet     sertraline (ZOLOFT) 100 MG tablet     irbesartan (AVAPRO) 300 MG tablet     propranolol (INDERAL LA) 60 MG 24 hr capsule     methimazole (TAPAZOLE) 5 MG tablet     furosemide (LASIX) 20 MG tablet     sertraline (ZOLOFT) 25 MG tablet     acetaminophen-codeine (TYLENOL #3) 300-30 MG per tablet     diazepam (VALIUM) 2 MG tablet     ASPIRIN PO     Calcium carb-Vitamin D 500 mg [place]-200 units (OSCAL WITH D;OYSTER SHELL CALCIUM) 500-200 MG-UNIT per tablet     cycloSPORINE (RESTASIS) 0.05 % ophthalmic emulsion     polyethylene glycol 0.4%- propylene glycol 0.3% (SYSTANE) 0.4-0.3 % SOLN ophthalmic solution     nitroGLYcerin (NITROSTAT) 0.4 MG sublingual tablet     CRANBERRY CONCENTRATE PO     lovastatin (MEVACOR) 40 MG tablet     omeprazole (PRILOSEC) 20 MG CR capsule     alendronate (FOSAMAX) 70 MG tablet     acetaminophen 650 MG TABS     polyethylene glycol (MIRALAX/GLYCOLAX) packet     Omega-3 Fatty Acids (FISH OIL) 500 MG CAPS     melatonin 3 MG tablet        Allergies   Allergen Reactions     No Known Drug Allergies          I have reviewed the Medications, Allergies, Past Medical and Surgical History, and Social History in the Epic system.    Review of Systems   Constitutional: Negative for fever.   Respiratory: Positive for shortness of breath. Negative for cough.    Cardiovascular: Positive for chest pain (heaviness).   Gastrointestinal: Negative for nausea and vomiting.   All other systems reviewed and are negative.      Physical Exam   BP: 143/63  Pulse: (!) 48  Heart Rate: 58  Temp: 98.3  F (36.8  C)  Resp: 20  Height: 144.8 cm (4' 9\")  SpO2: 100 %      Physical Exam    GEN:  Alert, well developed, no acute distress, but is slightly tearful  HEENT:  PERRL, EOMI, " Mucous membranes are moist.   Cardio: Irregularly irregular, there is a soft systolic murmur heard best at the right upper sternal border, radial pulses equal bilaterally  PULM:  Lungs clear, good air movement, no wheezes, rales   Abd:  Soft, normal bowel sounds, no focal tenderness  Back exam:  No CVA tenderness  Musculoskeletal:  normal range of motion, there is bilateral lower extremity swelling which the patient states is chronic and unchanged from baseline, no  calf tenderness  Neuro:  Alert and oriented X3, Follows commands, moving all extremities spontaneously   Skin:  Warm, dry   ED Course   11:08 PM  The patient was seen and examined by Octavia Major MD in Room 20.     ED Course     Procedures             EKG Interpretation:      Interpreted by Dhara Major  Time reviewed: 11:00  Symptoms at time of EKG: chest pressure   Rhythm: sinus bradycardia with marked sinus arrhythmia  Rate: 55  Axis: Left  Ectopy: none  Conduction: normal, moderate voltage criteria for LVH, may be normal variant  ST Segments/ T Waves: No ST-T wave changes  Q Waves: none  Comparison to prior: Unchanged from 1/17/18, except for the rate.    Clinical Impression: Sinus bradycardia with marked sinus arrhythmia, left axis deviation        Critical Care time:  none  Labs are normal except as shown.  Chest x-ray results are shown here as well.  Results for orders placed or performed during the hospital encounter of 03/14/18   XR Chest 2 Views    Narrative    Exam: XR CHEST 2 VW, 3/14/2018 11:48 AM    Indication: shortness of breath, chest pressure;     Comparison: 1/16/2018    Findings:   Normal cardiac mediastinal silhouette and pulmonary vasculature. No  pulmonary opacity, pleural effusion or pneumothorax. Bones and upper  abdomen are unremarkable.      Impression    Impression: No acute cardiopulmonary findings.    SAMREEN CLINE MD ([place])     Patient was discussed with the cardiology fellow.  Given her negative workup in the  emergency department and negative coronary angiogram in December, cardiology did not feel she needed any further imaging of her coronary arteries.  They did recommend trending her troponins and doing a limited echocardiogram.  Patient is also asking for a GI consult in the ED.  I explained that she is has been having diarrhea for 4 months, she does not need an emergent GI consult.  Patient reports that she is having 2 loose stools a day, usually in the mornings.  She also reports that she has talked to her primary care physician about this but the primary physician has not done any stool studies or cultures.  The primary physician has recommended that she take fiber supplements.  Patient is very frustrated this is been going on for so long.  Labs Ordered and Resulted from Time of ED Arrival Up to the Time of Departure from the ED   BASIC METABOLIC PANEL - Abnormal; Notable for the following:        Result Value    Glucose 104 (*)     GFR Estimate 55 (*)     All other components within normal limits   CBC WITH PLATELETS DIFFERENTIAL   D DIMER QUANTITATIVE   NT PROBNP INPATIENT   TROPONIN I   TSH WITH FREE T4 REFLEX   TROPONIN I   ISTAT TROPONIN NURSING POCT   IP ASSIGN PROVIDER TEAM TO TREATMENT TEAM   OBSERVATION GOALS   PATIENT CARE ORDER   CARDIAC CONTINUOUS MONITORING   ASSESS   VITAL SIGNS   ACTIVITY   PERIPHERAL IV CATHETER   TROPONIN POCT   ENTERIC BACTERIA AND VIRUS PANEL BY BLAKE STOOL   CLOSTRIDIUM DIFFICILE TOXIN B       Consults  Cardiology: At Bedside (03/14/18 1300)    Assessments & Plan (with Medical Decision Making)   Patient presents with chest pressure and shortness of breath that started earlier this morning.  ED workup is negative including negative troponin, BNP, d-dimer, chest x-ray.  She arrived at the ED only couple of hours after her symptoms started, so I do think it is worthwhile to repeat the troponin and to a resting echocardiogram.  She had similar symptoms when she presented in December  and at that time had global akinesis seen on her echocardiogram.  ED workup is negative for evidence of pneumonia, pulmonary edema, PE, pneumothorax or other cause of acute chest pain.  Certainly she could have some mild CHF symptoms.  Acute coronary syndrome is unlikely, but will repeat troponins to rule this out.  Patient is to be admitted to the observation unit for these repeat troponins as well as monitoring and echocardiogram.  Unclear what is causing the diarrhea.  Seems less likely to be an infection since she has had it for 4 months now, however would recommend testing the stool for C. difficile and enteric pathogens and follow-up with gastroenterology or obtain gastroenterology consult while in the observation unit.     I have reviewed the nursing notes.    I have reviewed the findings, diagnosis, plan and need for follow up with the patient.    New Prescriptions    No medications on file       Final diagnoses:   Chest pressure   Diarrhea, unspecified type   I, Rossy Pozo, am serving as a trained medical scribe to document services personally performed by Octavia Major MD, based on the provider's statements to me.      I, Octavia Major MD, was physically present and have reviewed and verified the accuracy of this note documented by Rossy Pozo.       3/14/2018   [facility], [place], EMERGENCY DEPARTMENT     Dhara Major MD  03/14/18 [id number]

## 2018-03-14 NOTE — H&P
[facility] ED Observation Admission Note    Chief Complaint   Patient presents with     Chest Pain       Assessment/Plan:  1. Chest pressure and shortness of breath - Presented 12/13/17 with chest pain,sob and elevated troponin. Underwent a coronary angiogram December 2017 which showed LM has no evidence of coronary artery disease.  LAD gives rise to the septal perforators, D1, D2, and D3.  No angiographic evidence of disease.-LCX with no angiographic evidence of disease. RCA shows, small in calibre, no evidence of disease. Last echo completed 1/16/18.Left ventricular function is normal.The EF is > 65%.The inferior vena cava was normal in size with preserved respiratory Variability. No pericardial effusion is present.The left ventricular function has improved. Reports symptoms are similar to when she had an MI in December. She denies any nausea, vomiting, fever, or cough. No hx of PE/DVT. ED physician spoke with Cards fellow who recommended tele monitoring, serial troponins and a limited echo cardiogram.  - Serial troponins are negative  - Tele monitoring  - Limited echo   - Continue daily aspirin  - GI cocktail  - TSH    #. Chronic Systolic HF:     #. Atrial Fib:      # . History of stress-induced cardiomyopathy,- discharged on  BB, ARB, statin, and aspirin. Admitted overnight on 1/16 for palpitations, noted to be in afib with RVR in the setting of volume overload and ongoing hyperthyroidism (though free t4 was normal that admission). She converted to sinus spontaneously. She was given a one time dose of xarelto and developed significant hematuria so this was d/c'ed  - Continue PTA BB, ARB, statin, and aspirin  -Continue lasix 10 mg daily    #. Hyperthyroidism: Recheck TSH with reflex to T4  -TSH is 1.94  -Continue tapazole 5 mg daily    4. Diarrhea - reports having diarrhea for the past month. Patient's primary recommended metamucil. No infectious work up has been completed. Patient denies fevers, chills or sick  contacts at home. Reports 5 diarrheal stools a day.  - Enteric precautions  -Stool Studies    5. Hypertension: on Propranolol 60 mg 24 hour capsule    6. GERD: Takes omeprazole 20 mg po BID daily    7. Hyperlipidemia: Lovastatin 40 mg at HS    #. History of gross hematuria, papillary bladder tumor - s/p bladder mass bx which were negative for malignancy (2/2018)     #. Care Planning Assessment: Lives with son since having MI 12/2017.   #. Osteoporosis:  -Calcium carb vitamin D, bid daily  -Foxamax 70 mg one tab every 7 days    HPI:    Dimple Oviedo is a 84 year old female with a history of HTN, MI (12/2017), cardiomyopathy, and GERDwho presents to the Emergency Department via EMS for evaluation with chest heaviness and shortness of breath.      Patient reports that she started having chest  heaviness and shortness of breath today at about 9:00 AM.  She was given aspirin and nitroglycerin by EMS and notes that her chest heaviness has improved now, but she is still having some shortness of breath.  Shortness of breath improves when she is sitting up.  She denies any nausea, vomiting, fever, or cough. Patient reports that her symptoms now are similar to those she had in December 2017 with her MI.  At that time she had an angiogram and was found to have no blockages.  Patient denies a history of PE/DVT.  She has had no recent travel. Patient has no history of lung disease and does not use home O2. She has had diarrhea for the past 4 months and is currently being evaluated for this as an outpatient. She is also being evaluated as an outpatient for hematuria.  She has been having some tremors recently and has an upcoming neurology appointment for evaluation of this.       In the ED   Vitals:BP:143/63 Pulse: 48Temp: 98.3Resp: 20 SP02:100 % Labs: Na 137, K 4.0 Cr 0.96, BUN 11 GFR 55, , Troponin <0.015, WBC 7.8, Hgb 13.1, plt 346, , D dimer 0.5. Medications: none Imaging: chest xray negative, ekg Sinus  bradycardia with marked sinus arrhythmia, left axis deviation unchanged from 1/17/18 aside from rate  Consults: curbsided cardiology Plan: Admit to ED observation for serial troponins, cardiac monitoring and echo.     On admission to the observation unit the patient was stable.    History:    Past Medical History:   Diagnosis Date     Calculus of kidney      Esophageal reflux      GERD (gastroesophageal reflux disease)      Hyperlipidemia LDL goal <130 5/9/2010     Malignant melanoma of skin of trunk, except scrotum (H)      Nonspecific abnormal finding     has living will 2004 -      Nontoxic multinodular goiter     no further eval /tx rec per pt     Osteopenia      Other psoriasis      Personal history of colonic polyps      PMR (polymyalgia rheumatica) (H)      Stress-induced cardiomyopathy      Undiagnosed cardiac murmurs      Unspecified constipation      Unspecified essential hypertension        Past Surgical History:   Procedure Laterality Date     C NONSPECIFIC PROCEDURE  2005    colonoscopy polyp repeat 2010     CYSTOSCOPY, BIOPSY BLADDER, COMBINED N/A 2/19/2018    Procedure: COMBINED CYSTOSCOPY, BIOPSY BLADDER;  Cystoscopy, Bladder Biopsy;  Surgeon: Kenna La MD;  Location: [facility] OR     ENDOSCOPIC ULTRASOUND LOWER GASTROINTESTIONAL TRACT (GI) N/A 10/30/2015    Procedure: ENDOSCOPIC ULTRASOUND LOWER GASTROINTESTIONAL TRACT (GI);  Surgeon: Daniel Jean-Baptiste MD;  Location: [facility] OR     LAPAROSCOPIC CHOLECYSTECTOMY WITH CHOLANGIOGRAMS N/A 11/1/2015    Procedure: LAPAROSCOPIC CHOLECYSTECTOMY WITH CHOLANGIOGRAMS;  Surgeon: Tonie Warren MD;  Location: [facility] OR     SURGICAL HISTORY OF -   1996    malignant melanoma     SURGICAL HISTORY OF -   1968    thyroid nodule     SURGICAL HISTORY OF -       D & C       Family History   Problem Relation Age of Onset     CANCER Father      dec - esophageal and laryngeal     HEART DISEASE Mother      Respiratory Mother      dec       Social History     Social  History     Marital status:      Spouse name:      Number of children: 2     Years of education: N/A     Occupational History      Retired     Social History Main Topics     Smoking status: Never Smoker     Smokeless tobacco: Never Used     Alcohol use No      Comment: 6 times per year-one drink     Drug use: No     Sexual activity: Yes     Partners: Male     Other Topics Concern      Service No     Blood Transfusions No     Exercise Yes     curves     Seat Belt Yes     Self-Exams Yes     Parent/Sibling W/ Cabg, Mi Or Angioplasty Before 65f 55m? No     Social History Narrative    December 7, 2009    Balanced Diet - Yes    Osteoporosis Prevention Measures - Dairy servings per day: 2 and Medication/Supplements (See current meds)    Regular Exercise -  Yes Describe curves, walking    Dental Exam - YES - Date: 10/2009    Eye Exam - YES - Date: 2008    Self Breast Exam - Yes    Abuse: Current or Past (Physical, Sexual or Emotional)- No    Do you feel safe in your environment - Yes    Guns stored in the home - No    Sunscreen used - Yes    Seatbelts used - Yes    Lipids -  YES - Date: 11/24/2009    Glucose -  YES - Date: 11/24/2009    Colon Cancer Screening - Colonoscopy 11/18/2005(date completed)    Hemoccults - YES - Date: 10/12/2004    Pap Test -  YES - Date: 09/22/2004    Do you have any concerns about STD's -  No    Mammography - YES - Date: 11/24/2009    DEXA - YES - Date: 11/20/2008    Immunizations reviewed and up to date - Yes    PAULETTE Spencer CMA                     No current facility-administered medications on file prior to encounter.   Current Outpatient Prescriptions on File Prior to Encounter:  sulfamethoxazole-trimethoprim (BACTRIM DS) 800-160 MG per tablet Take 1 tablet by mouth 2 times daily for 7 days   sertraline (ZOLOFT) 100 MG tablet Take 1 tablet (100 mg) by mouth daily   irbesartan (AVAPRO) 300 MG tablet TAKE 1 TABLET EVERY DAY   propranolol (INDERAL LA) 60 MG 24 hr capsule Take 1  "capsule (60 mg) by mouth daily   methimazole (TAPAZOLE) 5 MG tablet Take 1 tablet (5 mg) by mouth daily   furosemide (LASIX) 20 MG tablet Take 0.5 tablets (10 mg) by mouth daily   sertraline (ZOLOFT) 25 MG tablet    acetaminophen-codeine (TYLENOL #3) 300-30 MG per tablet Take 1 tablet by mouth every 6 hours as needed for pain maximum 4 tablet(s) per day   diazepam (VALIUM) 2 MG tablet Take 1 tablet (2 mg) by mouth every 12 hours as needed for anxiety or sleep   ASPIRIN PO Take 81 mg by mouth At Bedtime   Calcium carb-Vitamin D 500 mg [place]-200 units (OSCAL WITH D;OYSTER SHELL CALCIUM) 500-200 MG-UNIT per tablet Take 1 tablet by mouth 2 times daily (with meals)   cycloSPORINE (RESTASIS) 0.05 % ophthalmic emulsion Place 1 drop into both eyes 2 times daily   polyethylene glycol 0.4%- propylene glycol 0.3% (SYSTANE) 0.4-0.3 % SOLN ophthalmic solution Place 1 drop into both eyes 3 times daily as needed for dry eyes   nitroGLYcerin (NITROSTAT) 0.4 MG sublingual tablet For chest pain place 1 tablet under the tongue every 5 minutes for 3 doses. If symptoms persist 5 minutes after 1st dose call 911.   CRANBERRY CONCENTRATE PO Take 1 capsule by mouth 2 times daily   lovastatin (MEVACOR) 40 MG tablet TAKE 1 TABLET AT BEDTIME (HYPERLIPIDEMIA LDL GOAL  BELOW 130)   omeprazole (PRILOSEC) 20 MG CR capsule Take 1 capsule (20 mg) by mouth daily   alendronate (FOSAMAX) 70 MG tablet TAKE 1 TABLET EVERY 7 DAYS AT LEAST 60 MIN BEFORE BREAKFAST AS DIRECTED \"SEE PACKAGE FOR ADDITIONAL INSTRUCTIONS\"   acetaminophen 650 MG TABS Take 650 mg by mouth every 8 hours as needed for mild pain or fever   polyethylene glycol (MIRALAX/GLYCOLAX) packet Take 17 g by mouth 2 times daily as needed for constipation   Omega-3 Fatty Acids (FISH OIL) 500 MG CAPS Take 1 capsule by mouth 2 times daily    melatonin 3 MG tablet Take 1 tablet (3 mg) by mouth nightly as needed for sleep       Data:    Results for orders placed or performed during the hospital " encounter of 03/14/18   XR Chest 2 Views    Narrative    Exam: XR CHEST 2 VW, 3/14/2018 11:48 AM    Indication: shortness of breath, chest pressure;     Comparison: 1/16/2018    Findings:   Normal cardiac mediastinal silhouette and pulmonary vasculature. No  pulmonary opacity, pleural effusion or pneumothorax. Bones and upper  abdomen are unremarkable.      Impression    Impression: No acute cardiopulmonary findings.    SAMREEN CLINE MD ([place])   CBC with platelets differential   Result Value Ref Range    WBC 7.8 4.0 - 11.0 10e9/L    RBC Count 4.27 3.8 - 5.2 10e12/L    Hemoglobin 13.1 11.7 - 15.7 g/dL    Hematocrit 39.3 35.0 - 47.0 %    MCV 92 78 - 100 fl    MCH 30.7 26.5 - 33.0 pg    MCHC 33.3 31.5 - 36.5 g/dL    RDW 14.8 10.0 - 15.0 %    Platelet Count 346 150 - 450 10e9/L    Diff Method Automated Method     % Neutrophils 67.7 %    % Lymphocytes 22.2 %    % Monocytes 8.6 %    % Eosinophils 1.0 %    % Basophils 0.4 %    % Immature Granulocytes 0.1 %    Nucleated RBCs 0 0 /100    Absolute Neutrophil 5.3 1.6 - 8.3 10e9/L    Absolute Lymphocytes 1.7 0.8 - 5.3 10e9/L    Absolute Monocytes 0.7 0.0 - 1.3 10e9/L    Absolute Eosinophils 0.1 0.0 - 0.7 10e9/L    Absolute Basophils 0.0 0.0 - 0.2 10e9/L    Abs Immature Granulocytes 0.0 0 - 0.4 10e9/L    Absolute Nucleated RBC 0.0    Basic metabolic panel   Result Value Ref Range    Sodium 137 133 - 144 mmol/L    Potassium 4.0 3.4 - 5.3 mmol/L    Chloride 104 94 - 109 mmol/L    Carbon Dioxide 21 20 - 32 mmol/L    Anion Gap 12 3 - 14 mmol/L    Glucose 104 (H) 70 - 99 mg/dL    Urea Nitrogen 11 7 - 30 mg/dL    Creatinine 0.96 0.52 - 1.04 mg/dL    GFR Estimate 55 (L) >60 mL/min/1.7m2    GFR Estimate If Black 67 >60 mL/min/1.7m2    Calcium 9.0 8.5 - 10.1 mg/dL   D dimer quantitative   Result Value Ref Range    D Dimer 0.5 0.0 - 0.50 ug/ml FEU   Nt probnp inpatient   Result Value Ref Range    N-Terminal Pro BNP Inpatient 757 0 - 1800 pg/mL   Troponin I   Result Value Ref Range     "Troponin I ES <0.015 0.000 - 0.045 ug/L   EKG 12 lead   Result Value Ref Range    Interpretation ECG Click View Image link to view waveform and result    Troponin POCT   Result Value Ref Range    Troponin I 0.00 0.00 - 0.10 ug/L             EKG Interpretation:    GEN:  Alert, well developed, no acute distress, but is slightly tearful  HEENT:  PERRL, EOMI, Mucous membranes are moist.   Cardio: Irregularly irregular, there is a soft systolic murmur heard best at the right upper sternal border, radial pulses equal bilaterally  PULM:  Lungs clear, good air movement, no wheezes, rales   Abd:  Soft, normal bowel sounds, no focal tenderness  Back exam:  No CVA tenderness  Musculoskeletal:  normal range of motion, there is bilateral lower extremity swelling which the patient states is chronic and unchanged from baseline, no  calf tenderness  Neuro:  Alert and oriented X3, Follows commands, moving all extremities spontaneously   Skin:  Warm, dry     ROS:    Review Of Systems  Skin: negative  Eyes: negative  Ears/Nose/Throat: negative  Respiratory: No shortness of breath, dyspnea on exertion, cough, or hemoptysis  Cardiovascular: negative  Gastrointestinal: negative and diarrhea  Genitourinary: positive for negative and urinary tract infection  Musculoskeletal: negative  Neurologic: negative  Psychiatric: negative  Hematologic/Lymphatic/Immunologic: negative  Endocrine: negative  PCP: Pop Zapien  CARDIAC RISK:     10 point ROS negative other than the symptoms noted above.      Exam:    Vitals:  B/P: 140/58, T: 98.3, P: 48, R: 19    Constitutional: Appears well hydrated, oriented X 3, asking appropriate questions   Head::\"Normocephalic. No masses, lesions, tenderness or abnormalities\"  ENT: ENT exam normal, no neck nodes or sinus tenderness  Cardiovascular: negative\",\"PMI normal. No lifts, heaves, or thrills. RRR. Mild grade 2/6 murmur, no clicks gallops or rub  Respiratory: \"negative\",\"Percussion normal. Good " "diaphragmatic excursion. Lungs clear\"  Gastrointestinal: \"Abdomen soft, non-tender. BS normal. No masses, organomegaly\"}  : Deferred, has UTI  Musculoskeletal: legs are thick but without edema  Skin: no suspicious lesions or rashes  Neurologic:  Reflexes normal and symmetric. Sensation grossly WNL.\"}  Psychiatric: :\"mentation appears normal\",\"affect anxious.   Hematologic/Lymphatic/Immunologic: normal ant/post cervical, axillary, supraclavicular and inguinal nodes    Consults: cardiology  FEN: Regular diet  DVT prophylaxis:   Code Status: DNR/DN  Disposition: Return to home with son    Signed:  Evie Multani, APRN, CNP  March 14, 2018 at 1:27 PM      This patient was discussed with the Care Team in the OBS Unit.  The patient's chart was reviewed and the patient was also seen and evaluated by me.  The plan of care was discussed and reviewed with the Care Team.  The above documentation reflects the initial evaluation, medical decision making and plan under my supervision.    Crow Lomas MD, FACEP  [facility] Staff Emergency Physician    "

## 2018-03-14 NOTE — TELEPHONE ENCOUNTER
Reason for Call:  Other referral    Detailed comments: Dim[facility]  and they are wanting to get a referral for who Dr Zapien recommends she see while there. Please give gavino @ call back as soon as possible due to this being an urgent matter.    Phone Number Patient can be reached at: Cell number on file:    Telephone Information:   Mobile [phone]       Best Time: anytime    Can we leave a detailed message on this number? YES    Call taken on 3/14/2018 at 12:01 PM by Clive Hidalgo

## 2018-03-14 NOTE — IP AVS SNAPSHOT
Unit 6D Observation [address] [address]    Phone:  [phone]    Fax:  [phone]                                       After Visit Summary   3/14/2018    Dimple Oviedo    MRN: [id number]           After Visit Summary Signature Page     I have received my discharge instructions, and my questions have been answered. I have discussed any challenges I see with this plan with the nurse or doctor.    ..........................................................................................................................................  Patient/Patient Representative Signature      ..........................................................................................................................................  Patient Representative Print Name and Relationship to Patient    ..................................................               ................................................  Date                                            Time    ..........................................................................................................................................  Reviewed by Signature/Title    ...................................................              ..............................................  Date                                                            Time

## 2018-03-14 NOTE — PROGRESS NOTES
Tele tech called pt on tele box.  Code status marked to desired level and fall risk band applied.

## 2018-03-14 NOTE — ED NOTES
Bed: ED20  Expected date: 3/14/18  Expected time: 10:41 AM  Means of arrival: Ambulance  Comments:  84 female  CP  ASA and nitro given

## 2018-03-14 NOTE — ED NOTES
"Pt presents with chest \"heaviness and SOB\"  that started earlier this am. Hx MI 12/17. Pt given 324 ASA en route and took nitro x1 prior to EMS transfer. No nitro given per EMS.   "

## 2018-03-14 NOTE — TELEPHONE ENCOUNTER
Regarding what condition?  Generally in the Emergency room, they contact oncall provider and it is very reasonable that she sees any oncall provider for any issue she may be experiencing.   Feel free to contact me with any other specific questions.

## 2018-03-15 VITALS
DIASTOLIC BLOOD PRESSURE: 87 MMHG | HEIGHT: 57 IN | TEMPERATURE: 97.7 F | HEART RATE: 48 BPM | OXYGEN SATURATION: 97 % | RESPIRATION RATE: 18 BRPM | SYSTOLIC BLOOD PRESSURE: 171 MMHG

## 2018-03-15 LAB
ANION GAP SERPL CALCULATED.3IONS-SCNC: 10 MMOL/L (ref 3–14)
BUN SERPL-MCNC: 12 MG/DL (ref 7–30)
C COLI+JEJUNI+LARI FUSA STL QL NAA+PROBE: NOT DETECTED
C DIFF TOX B STL QL: NEGATIVE
CALCIUM SERPL-MCNC: 9.2 MG/DL (ref 8.5–10.1)
CHLORIDE SERPL-SCNC: 104 MMOL/L (ref 94–109)
CO2 SERPL-SCNC: 24 MMOL/L (ref 20–32)
CREAT SERPL-MCNC: 1 MG/DL (ref 0.52–1.04)
EC STX1 GENE STL QL NAA+PROBE: NOT DETECTED
EC STX2 GENE STL QL NAA+PROBE: NOT DETECTED
ENTERIC PATHOGEN COMMENT: NORMAL
ERYTHROCYTE [DISTWIDTH] IN BLOOD BY AUTOMATED COUNT: 15 % (ref 10–15)
GFR SERPL CREATININE-BSD FRML MDRD: 53 ML/MIN/1.7M2
GLUCOSE SERPL-MCNC: 125 MG/DL (ref 70–99)
HCT VFR BLD AUTO: 39.9 % (ref 35–47)
HGB BLD-MCNC: 13.2 G/DL (ref 11.7–15.7)
INTERPRETATION ECG - MUSE: NORMAL
MCH RBC QN AUTO: 30.9 PG (ref 26.5–33)
MCHC RBC AUTO-ENTMCNC: 33.1 G/DL (ref 31.5–36.5)
MCV RBC AUTO: 93 FL (ref 78–100)
NOROV GI+II ORF1-ORF2 JNC STL QL NAA+PR: NOT DETECTED
PLATELET # BLD AUTO: 334 10E9/L (ref 150–450)
POTASSIUM SERPL-SCNC: 4.9 MMOL/L (ref 3.4–5.3)
RBC # BLD AUTO: 4.27 10E12/L (ref 3.8–5.2)
RVA NSP5 STL QL NAA+PROBE: NOT DETECTED
SALMONELLA SP RPOD STL QL NAA+PROBE: NOT DETECTED
SHIGELLA SP+EIEC IPAH STL QL NAA+PROBE: NOT DETECTED
SODIUM SERPL-SCNC: 138 MMOL/L (ref 133–144)
SPECIMEN SOURCE: NORMAL
TROPONIN I SERPL-MCNC: <0.015 UG/L (ref 0–0.04)
V CHOL+PARA RFBL+TRKH+TNAA STL QL NAA+PR: NOT DETECTED
WBC # BLD AUTO: 7.7 10E9/L (ref 4–11)
Y ENTERO RECN STL QL NAA+PROBE: NOT DETECTED

## 2018-03-15 PROCEDURE — 85027 COMPLETE CBC AUTOMATED: CPT | Performed by: NURSE PRACTITIONER

## 2018-03-15 PROCEDURE — 25000132 ZZH RX MED GY IP 250 OP 250 PS 637: Mod: GY | Performed by: NURSE PRACTITIONER

## 2018-03-15 PROCEDURE — 84484 ASSAY OF TROPONIN QUANT: CPT | Performed by: NURSE PRACTITIONER

## 2018-03-15 PROCEDURE — 80048 BASIC METABOLIC PNL TOTAL CA: CPT | Performed by: NURSE PRACTITIONER

## 2018-03-15 PROCEDURE — 36415 COLL VENOUS BLD VENIPUNCTURE: CPT | Performed by: NURSE PRACTITIONER

## 2018-03-15 PROCEDURE — A9270 NON-COVERED ITEM OR SERVICE: HCPCS | Mod: GY | Performed by: NURSE PRACTITIONER

## 2018-03-15 PROCEDURE — 87506 IADNA-DNA/RNA PROBE TQ 6-11: CPT | Performed by: NURSE PRACTITIONER

## 2018-03-15 PROCEDURE — 99217 ZZC OBSERVATION CARE DISCHARGE: CPT | Mod: Z6 | Performed by: PHYSICIAN ASSISTANT

## 2018-03-15 PROCEDURE — G0378 HOSPITAL OBSERVATION PER HR: HCPCS

## 2018-03-15 PROCEDURE — 87493 C DIFF AMPLIFIED PROBE: CPT | Performed by: NURSE PRACTITIONER

## 2018-03-15 RX ADMIN — Medication 10 MG: at 01:09

## 2018-03-15 RX ADMIN — PROPRANOLOL HYDROCHLORIDE 60 MG: 60 CAPSULE, EXTENDED RELEASE ORAL at 10:48

## 2018-03-15 RX ADMIN — IRBESARTAN 300 MG: 300 TABLET ORAL at 08:01

## 2018-03-15 RX ADMIN — OMEPRAZOLE 20 MG: 20 CAPSULE, DELAYED RELEASE ORAL at 07:12

## 2018-03-15 RX ADMIN — METHIMAZOLE 5 MG: 5 TABLET ORAL at 08:01

## 2018-03-15 RX ADMIN — ASPIRIN 81 MG: 81 TABLET, COATED ORAL at 08:01

## 2018-03-15 RX ADMIN — SULFAMETHOXAZOLE AND TRIMETHOPRIM 1 TABLET: 800; 160 TABLET ORAL at 08:01

## 2018-03-15 RX ADMIN — SERTRALINE HYDROCHLORIDE 100 MG: 50 TABLET ORAL at 08:01

## 2018-03-15 RX ADMIN — FUROSEMIDE 10 MG: 20 TABLET ORAL at 08:02

## 2018-03-15 NOTE — PLAN OF CARE
"Problem: Patient Care Overview  Goal: Discharge Needs Assessment  Outpatient/Observation goals to be met before discharge home:     List all goals to be met before discharge home:   1- Serial troponins and echo complete.-  In progress, one negative   2- Able to tolerate oral intake -Yes  3- Vital signs normal or at patient baseline- Yes   4- Safe disposition plan has been identified - Not yet  5- Nurse to notify provider when observation goals have been met and patient is ready for discharge.    Pt ate well, voided well. Denies pain, CP or discomfort. NSR. AVSS  /52 (BP Location: Right arm)  Pulse (!) 48  Temp 97.7  F (36.5  C) (Oral)  Resp 20  Ht 1.448 m (4' 9\")  SpO2 97%            "

## 2018-03-15 NOTE — PROGRESS NOTES
Discharge instructions reviewed, understood, and signed by patient. VSS, PIV removed,medications reviewed and understood, patient has all belongings. Patient left unit via wheelchair with family.

## 2018-03-15 NOTE — PLAN OF CARE
Problem: Patient Care Overview  Goal: Discharge Needs Assessment  Outpatient/Observation goals to be met before discharge home:    List all goals to be met before discharge home:   1- Serial troponins and echo complete.-  In progress, one negative   2- Able to tolerate oral intake -Yes  3- Vital signs normal or at patient baseline- Yes   4- Safe disposition plan has been identified - Not yet  5- Nurse to notify provider when observation goals have been met and patient is ready for discharge.

## 2018-03-15 NOTE — PLAN OF CARE
Problem: Patient Care Overview  Goal: Plan of Care/Patient Progress Review  Outpatient/Observation goals to be met before discharge home:    PRIMARY DIAGNOSIS: Chest pain/diarrhea  OUTPATIENT/OBSERVATION GOALS TO BE MET BEFORE DISCHARGE:  Serial troponins and echo complete: NO-last two troponins to be drawn and echo to be done in the AM.  Able to tolerate oral intake: YES-tolerating reg diet without any issues.  Vital signs normal or at patient baseline: YES-vitals WNL.  Safe disposition plan has been identified: NO-yet to be determined.  *Nurse to notify MD when observation goals have been met and patient is ready for discharge.

## 2018-03-15 NOTE — PLAN OF CARE
Problem: Patient Care Overview  Goal: Plan of Care/Patient Progress Review  Outpatient/Observation goals to be met before discharge home:    PRIMARY DIAGNOSIS: Chest pain/diarrhea  OUTPATIENT/OBSERVATION GOALS TO BE MET BEFORE DISCHARGE:  Serial troponins and echo complete: NO-a total of 3 have been drawn and all were negative; echo to be done in the AM.  Able to tolerate oral intake: YES-tolerating reg diet without any issues.  Vital signs normal or at patient baseline: YES-vitals WNL.  Safe disposition plan has been identified: NO-yet to be determined.  *Nurse to notify MD when observation goals have been met and patient is ready for discharge.

## 2018-03-15 NOTE — DISCHARGE SUMMARY
Discharge Summary    Dimple Oviedo MRN# [id number]   YOB: 1933 Age: 84 year old     Date of Admission:  3/14/2018  Date of Discharge:  3/15/2018  Admitting Physician:  Crow Mcadams MD  Discharge Physician:  CROW MCADAMS  Discharging Service:  Emergency Department Observation Unit     Primary Provider: Pop Zapien          Discharge Diagnosis:   Chest pressure/SOB  Diarrhea             Discharge Disposition:   Discharged to home           Condition on Discharge:   Discharge condition: Stable   Code status on discharge:            Procedures:   Cardiology procedures perfromed:   ECHO             Discharge Medications:     Current Discharge Medication List      CONTINUE these medications which have NOT CHANGED    Details   sulfamethoxazole-trimethoprim (BACTRIM DS) 800-160 MG per tablet Take 1 tablet by mouth 2 times daily for 7 days  Qty: 14 tablet, Refills: 0    Associated Diagnoses: Urinary tract infection      sertraline (ZOLOFT) 100 MG tablet Take 1 tablet (100 mg) by mouth daily  Qty: 90 tablet, Refills: 1    Associated Diagnoses: THERON (generalized anxiety disorder)      irbesartan (AVAPRO) 300 MG tablet TAKE 1 TABLET EVERY DAY  Qty: 90 tablet, Refills: 2    Associated Diagnoses: Essential hypertension with goal blood pressure less than 140/90      propranolol (INDERAL LA) 60 MG 24 hr capsule Take 1 capsule (60 mg) by mouth daily  Qty: 90 capsule, Refills: 2    Associated Diagnoses: Nontoxic multinodular goiter      methimazole (TAPAZOLE) 5 MG tablet Take 1 tablet (5 mg) by mouth daily  Qty: 90 tablet, Refills: 1    Associated Diagnoses: Nontoxic multinodular goiter      furosemide (LASIX) 20 MG tablet Take 0.5 tablets (10 mg) by mouth daily  Qty: 30 tablet, Refills: 3    Associated Diagnoses: Elevated troponin      acetaminophen-codeine (TYLENOL #3) 300-30 MG per tablet Take 1 tablet by mouth every 6 hours as needed for pain maximum 4 tablet(s) per day  Qty: 8 tablet,  "Refills: 0    Associated Diagnoses: Other microscopic hematuria      diazepam (VALIUM) 2 MG tablet Take 1 tablet (2 mg) by mouth every 12 hours as needed for anxiety or sleep  Qty: 20 tablet, Refills: 0    Associated Diagnoses: THERON (generalized anxiety disorder)      ASPIRIN PO Take 81 mg by mouth At Bedtime      Calcium carb-Vitamin D 500 mg [place]-200 units (OSCAL WITH D;OYSTER SHELL CALCIUM) 500-200 MG-UNIT per tablet Take 1 tablet by mouth 2 times daily (with meals)      cycloSPORINE (RESTASIS) 0.05 % ophthalmic emulsion Place 1 drop into both eyes 2 times daily      polyethylene glycol 0.4%- propylene glycol 0.3% (SYSTANE) 0.4-0.3 % SOLN ophthalmic solution Place 1 drop into both eyes 3 times daily as needed for dry eyes      nitroGLYcerin (NITROSTAT) 0.4 MG sublingual tablet For chest pain place 1 tablet under the tongue every 5 minutes for 3 doses. If symptoms persist 5 minutes after 1st dose call 911.  Qty: 25 tablet, Refills: 3    Associated Diagnoses: Elevated troponin      CRANBERRY CONCENTRATE PO Take 1 capsule by mouth 2 times daily      lovastatin (MEVACOR) 40 MG tablet TAKE 1 TABLET AT BEDTIME (HYPERLIPIDEMIA LDL GOAL  BELOW 130)  Qty: 90 tablet, Refills: 2    Associated Diagnoses: Hyperlipidemia LDL goal <130      omeprazole (PRILOSEC) 20 MG CR capsule Take 1 capsule (20 mg) by mouth daily  Qty: 180 capsule, Refills: 3    Associated Diagnoses: Gastroesophageal reflux disease without esophagitis      alendronate (FOSAMAX) 70 MG tablet TAKE 1 TABLET EVERY 7 DAYS AT LEAST 60 MIN BEFORE BREAKFAST AS DIRECTED \"SEE PACKAGE FOR ADDITIONAL INSTRUCTIONS\"  Qty: 12 tablet, Refills: 2    Associated Diagnoses: High risk medication use; Osteopenia      acetaminophen 650 MG TABS Take 650 mg by mouth every 8 hours as needed for mild pain or fever  Qty: 100 tablet, Refills: 0    Associated Diagnoses: Ascending cholangitis      polyethylene glycol (MIRALAX/GLYCOLAX) packet Take 17 g by mouth 2 times daily as needed for " constipation  Qty: 7 packet, Refills: 0    Associated Diagnoses: Other constipation      Omega-3 Fatty Acids (FISH OIL) 500 MG CAPS Take 1 capsule by mouth 2 times daily       melatonin 3 MG tablet Take 1 tablet (3 mg) by mouth nightly as needed for sleep  Qty: 90 tablet, Refills: 0    Associated Diagnoses: Insomnia                   Consultations:   No consultations were requested during this admission             Brief History of Illness:   Dimple Oviedo is a 84 year old female with a history of HTN, MI (12/2017), cardiomyopathy, and GERDwho presents to the Emergency Department via EMS for evaluation with chest heaviness and shortness of breath.           Hospital Course:   1. Chest pressure and shortness of breath: This is an 84 year old female patient who presented in the ED with chest pressure and dyspnea. She has history that is signficant for HTN, MI, cardiomyopathy, hyperthyroidism, and GERD.  She has recent history of chest pain episode on 12/13/17 and elevated troponin. Subsequently, underwent a coronary angiogram in December 2017 which showed LM has no evidence of coronary artery disease.  LAD gives rise to the septal perforators, D1, D2, and D3.  No angiographic evidence of disease.-LCX with no angiographic evidence of disease. RCA shows, small in calibre, no evidence of disease. Last echo completed 1/16/18. Left ventricular function normal and EF is > 65%.The inferior vena cava was normal in size with preserved respiratory Variability. No pericardial effusion was seen. ED work up included Na 137, K 4.0 Cr 0.96, BUN 11 GFR 55, , Troponin <0.015, WBC 7.8, Hgb 13.1, plt 346, , D dimer 0.5. Chest xray negative, ekg Sinus bradycardia with marked sinus arrhythmia, left axis deviation unchanged from 1/17/18 aside from rate. Cardiology was  curbsided who recommended  admittion to ED observation for serial troponins, cardiac monitoring and echo in the morning. Serial troponins all negative.  Resting Echo obtained this morning shows global and regional left ventricular function is normal with an EF of 60-65%. No regional wall motion abnormalities are seen. At this point, chest pain does not seem to related to cardiac. Discussed cardiac work up results with the patient. She was discharged to home in stable condition.     #. Chronic Systolic HF: Last echo EF 30-35%, 12/13/17 -> improved on 1/17/18 TTE EF>65%  -Continue with Lasix 10 mg daily     # . History of stress-induced cardiomyopathy/Atrial Fib:  Patient was admitted overnight on 1/16 for palpitations, noted to be in afib with RVR in the setting of volume overload and ongoing hyperthyroidism (though free t4 was normal that admission). She converted to sinus spontaneously. She was given a one time dose of xarelto and developed significant hematuria so this was d/c'ed. She was discharged on  BB, ARB, statin, and aspirin.   -Continue PTA BB, ARB, statin, and aspirin  -Continue lasix 10 mg daily     #. Hyperthyroidism: Recheck TSH with reflex to T4  -TSH is 1.94  -Continue tapazole 5 mg daily     4. Diarrhea: She has been having intermittent diarrhea for the past month. Patient's primary recommended metamucil. No infectious work up has been completed. Patient denies fevers, chills or sick contacts at home. C. Diff and enterica panel are both negative. She has had only 1 BM since admission. GI service curbsided who recommended to continue with Metamucil x2 weeks and GI outpatient follow up. Patient was advised to minimize dairy products and avoid spicy foods.     5. Hypertension: on Propranolol 60 mg 24 hour capsule     6. GERD: Takes omeprazole 20 mg po BID daily     7. Hyperlipidemia: Lovastatin 40 mg at HS     #. 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 Care Planning Assessment: Lives with son since having MI 12/2017.   #. Osteoporosis:  -Calcium carb vitamin D, bid  "daily  -Foxamax 70 mg one tab every 7 days            Final Day of Progress before Discharge:       Physical Exam:  Blood pressure 171/87, pulse (!) 48, temperature 97.7  F (36.5  C), temperature source Oral, resp. rate 18, height 1.448 m (4' 9\"), SpO2 97 %, not currently breastfeeding.    EXAM:  Physical Exam   Constitutional: Pt is oriented to person, place, and time.Pt appears well-developed and well-nourished.   HENT: Unremarkable  Head: Normocephalic and atraumatic.   Eyes: Conjunctivae are normal. Pupils are equal, round, and reactive to light.   Neck: Normal range of motion. Neck supple.   Cardiovascular: Normal rate, regular rhythm, normal heart sounds and intact distal pulses.    Pulmonary/Chest: Effort normal and breath sounds normal. No respiratory distress. Pt has no wheezes. Pt has no rales  Abdominal: Soft. Bowel sounds are normal. Pt exhibits no distension and no mass. No tenderness. Pt has no rebound and no guarding.   Musculoskeletal: Normal range of motion. Pt exhibits no edema.   Neurological: Pt is alert and oriented to person, place, and time. Normal reflexes.   Skin: Skin is warm and dry. No rash noted.   Psychiatric: Pt has a normal mood and affect. Behavior is normal. Judgment and thought content normal.             Data:  All laboratory data reviewed             Significant Results:   None  Results for orders placed or performed during the hospital encounter of 03/14/18   XR Chest 2 Views    Narrative    Exam: XR CHEST 2 VW, 3/14/2018 11:48 AM    Indication: shortness of breath, chest pressure;     Comparison: 1/16/2018    Findings:   Normal cardiac mediastinal silhouette and pulmonary vasculature. No  pulmonary opacity, pleural effusion or pneumothorax. Bones and upper  abdomen are unremarkable.      Impression    Impression: No acute cardiopulmonary findings.    SAMREEN CLINE MD ([place])   CBC with platelets differential   Result Value Ref Range    WBC 7.8 4.0 - 11.0 10e9/L    RBC Count 4.27 " 3.8 - 5.2 10e12/L    Hemoglobin 13.1 11.7 - 15.7 g/dL    Hematocrit 39.3 35.0 - 47.0 %    MCV 92 78 - 100 fl    MCH 30.7 26.5 - 33.0 pg    MCHC 33.3 31.5 - 36.5 g/dL    RDW 14.8 10.0 - 15.0 %    Platelet Count 346 150 - 450 10e9/L    Diff Method Automated Method     % Neutrophils 67.7 %    % Lymphocytes 22.2 %    % Monocytes 8.6 %    % Eosinophils 1.0 %    % Basophils 0.4 %    % Immature Granulocytes 0.1 %    Nucleated RBCs 0 0 /100    Absolute Neutrophil 5.3 1.6 - 8.3 10e9/L    Absolute Lymphocytes 1.7 0.8 - 5.3 10e9/L    Absolute Monocytes 0.7 0.0 - 1.3 10e9/L    Absolute Eosinophils 0.1 0.0 - 0.7 10e9/L    Absolute Basophils 0.0 0.0 - 0.2 10e9/L    Abs Immature Granulocytes 0.0 0 - 0.4 10e9/L    Absolute Nucleated RBC 0.0    Basic metabolic panel   Result Value Ref Range    Sodium 137 133 - 144 mmol/L    Potassium 4.0 3.4 - 5.3 mmol/L    Chloride 104 94 - 109 mmol/L    Carbon Dioxide 21 20 - 32 mmol/L    Anion Gap 12 3 - 14 mmol/L    Glucose 104 (H) 70 - 99 mg/dL    Urea Nitrogen 11 7 - 30 mg/dL    Creatinine 0.96 0.52 - 1.04 mg/dL    GFR Estimate 55 (L) >60 mL/min/1.7m2    GFR Estimate If Black 67 >60 mL/min/1.7m2    Calcium 9.0 8.5 - 10.1 mg/dL   D dimer quantitative   Result Value Ref Range    D Dimer 0.5 0.0 - 0.50 ug/ml FEU   Nt probnp inpatient   Result Value Ref Range    N-Terminal Pro BNP Inpatient 757 0 - 1800 pg/mL   Troponin I   Result Value Ref Range    Troponin I ES <0.015 0.000 - 0.045 ug/L   TSH with free T4 reflex   Result Value Ref Range    TSH 1.94 0.40 - 4.00 mU/L   Troponin I   Result Value Ref Range    Troponin I ES <0.015 0.000 - 0.045 ug/L   Troponin I - Now then in 4 hours x 2    Result Value Ref Range    Troponin I ES <0.015 0.000 - 0.045 ug/L   Troponin I - Now then in 4 hours x 2    Result Value Ref Range    Troponin I ES <0.015 0.000 - 0.045 ug/L   Basic metabolic panel   Result Value Ref Range    Sodium 138 133 - 144 mmol/L    Potassium 4.9 3.4 - 5.3 mmol/L    Chloride 104 94 - 109  mmol/L    Carbon Dioxide 24 20 - 32 mmol/L    Anion Gap 10 3 - 14 mmol/L    Glucose 125 (H) 70 - 99 mg/dL    Urea Nitrogen 12 7 - 30 mg/dL    Creatinine 1.00 0.52 - 1.04 mg/dL    GFR Estimate 53 (L) >60 mL/min/1.7m2    GFR Estimate If Black 64 >60 mL/min/1.7m2    Calcium 9.2 8.5 - 10.1 mg/dL   CBC with platelets   Result Value Ref Range    WBC 7.7 4.0 - 11.0 10e9/L    RBC Count 4.27 3.8 - 5.2 10e12/L    Hemoglobin 13.2 11.7 - 15.7 g/dL    Hematocrit 39.9 35.0 - 47.0 %    MCV 93 78 - 100 fl    MCH 30.9 26.5 - 33.0 pg    MCHC 33.1 31.5 - 36.5 g/dL    RDW 15.0 10.0 - 15.0 %    Platelet Count 334 150 - 450 10e9/L   EKG 12 lead   Result Value Ref Range    Interpretation ECG Click View Image link to view waveform and result    EKG 12-lead, tracing only   Result Value Ref Range    Interpretation ECG Click View Image link to view waveform and result    Troponin POCT   Result Value Ref Range    Troponin I 0.00 0.00 - 0.10 ug/L   ECHO LIMITED WITH OPTISON    Narrative    [id number]  [id number]  [id number]  [id number]^FERNANDO^KELLY^ELISEO           [facility],[place]  Echocardiography Laboratory  [address] [place] [id number]     Name: QUIN VALDEZ  MRN: [id number]  : 1933  Study Date: 2018 01:41 PM  Age: 84 yrs  Gender: Female  Patient Location: [facility]  Reason For Study: Chest Pressure  Ordering Physician: KELLY KING  Performed By: Kade Wolfe RDCS     BSA: 1.6 m2  Height: 57 in  Weight: 150 lb  _____________________________________________________________________________  __        Procedure  Limited Portable Echo Adult. Contrast Optison. Optison (NDC #0357-8550-37)  given intravenously. Patient was given 6 ml mixture of 3 ml Optison and 6 ml  saline. 3 ml wasted.  _____________________________________________________________________________  __        Interpretation Summary  Global and regional left ventricular function is normal with an EF of 60-65%.  No regional wall motion  abnormalities are seen.  Right ventricular function, chamber size, wall motion, and thickness are  normal.  No pericardial effusion is present.  _____________________________________________________________________________  __        Left Ventricle  Global and regional left ventricular function is normal with an EF of 60-65%.  No regional wall motion abnormalities are seen.     Right Ventricle  Right ventricular function, chamber size, wall motion, and thickness are  normal.     Pericardium  No pericardial effusion is present.     _____________________________________________________________________________  __                       Report approved by: Ade Razo 03/14/2018 02:12 PM                 _____________________________________________________________________________  __      Enteric Bacteria and Virus Panel by BLAKE Stool   Result Value Ref Range    Campylobacter group by BLAKE Not Detected NDET^Not Detected    Salmonella species by BLAKE Not Detected NDET^Not Detected    Shigella species by BLAKE Not Detected NDET^Not Detected    Vibrio group by BLAKE Not Detected NDET^Not Detected    Rotavirus A by BLAKE Not Detected NDET^Not Detected    Shiga toxin 1 gene by BLAKE Not Detected NDET^Not Detected    Shiga toxin 2 gene by BLAKE Not Detected NDET^Not Detected    Norovirus I and II by BLAKE Not Detected NDET^Not Detected    Yersinia enterocolitica by BLAKE Not Detected NDET^Not Detected    Enteric pathogen comment       Testing performed by multiplexed, qualitative PCR using the [employer] Enteric   Pathogens Nucleic Acid Test. Results should not be used as the sole basis for diagnosis,   treatment, or other patient management decisions.     Clostridium difficile toxin B PCR   Result Value Ref Range    Specimen Description Feces     C Diff Toxin B PCR Negative NEG^Negative      Recent Results (from the past 48 hour(s))   XR Chest 2 Views    Narrative    Exam: XR CHEST 2 VW, 3/14/2018 11:48 AM    Indication:  shortness of breath, chest pressure;     Comparison: 1/16/2018    Findings:   Normal cardiac mediastinal silhouette and pulmonary vasculature. No  pulmonary opacity, pleural effusion or pneumothorax. Bones and upper  abdomen are unremarkable.      Impression    Impression: No acute cardiopulmonary findings.    SAMREEN CLINE MD ([place])                Pending Results:   Unresulted Labs Ordered in the Past 30 Days of this Admission     Date and Time Order Name Status Description    3/15/2018 0700 Basic metabolic panel In process     3/14/2018 1309 Enteric Bacteria and Virus Panel by BLAKE Stool In process     3/8/2018 0946 CYTOLOGY NON GYN In process                   Discharge Instructions and Follow-Up:   No discharge procedures on file.       Attestation:  Mile Gaona PA-C        This patient was discussed with the Care Team in the OBS Unit.  The patient's chart was reviewed and the patient was also seen and evaluated by me.  The plan of care was discussed and reviewed with the Care Team.  The above documentation reflects the evaluation, medical decision making and plan under my supervision.    Crow Lomas MD, FACEP  [facility] Staff Emergency Physician

## 2018-03-15 NOTE — PROGRESS NOTES
PRIMARY DIAGNOSIS: Chest pain/diarrhea  OUTPATIENT/OBSERVATION GOALS TO BE MET BEFORE DISCHARGE:  Serial troponins and echo complete: NO-a total of 4 have been drawn and all were negative; awaiting ECHO.  Able to tolerate oral intake: YES-tolerating reg diet without any issues.  Vital signs normal or at patient baseline: YES-vitals WNL.  Safe disposition plan has been identified: NO-yet to be determined.  *Nurse to notify MD when observation goals have been met and patient is ready for discharge.

## 2018-03-16 ENCOUNTER — TELEPHONE (OUTPATIENT)
Dept: FAMILY MEDICINE | Facility: CLINIC | Age: 83
End: 2018-03-16

## 2018-03-16 LAB — COPATH REPORT: NORMAL

## 2018-03-16 NOTE — TELEPHONE ENCOUNTER
ED / Discharge Outreach Protocol    Patient Contact    Attempt # 1    Was call answered?  No.  Left message on voicemail with information to call triage back.  SHASHANK MurrellN, RN

## 2018-03-16 NOTE — TELEPHONE ENCOUNTER
Please advise on this message.  The GI referral I find is for [facility] dated 3/14/18.  ATUL Lee

## 2018-03-16 NOTE — TELEPHONE ENCOUNTER
Patient is unable to get into [facility] for about 6 wks so was told Dr Zapien could call the provider to provider line to try and get in sooner?      Please let patient know if you can do this and or when done    Ok to leave message

## 2018-03-16 NOTE — TELEPHONE ENCOUNTER
"Patient scheduled for follow up appt. With Dr. Zapien at 2pm. Writer used a HOLD spot due to Dr. Zapien not having any availability until the 26th and patient requesting to be seen as soon as possible.     Patient states, \"I can hardly stand it anymore.\"    Thanks! Mandi Dial RN    "

## 2018-03-16 NOTE — TELEPHONE ENCOUNTER
Unfortunately that is typical time frame. I can certainly call but generally they may not get you in sooner unless it is life threatening.   She can see me in the clinic and we can do further testing while she is waiting to see GI.

## 2018-03-19 ENCOUNTER — OFFICE VISIT (OUTPATIENT)
Dept: FAMILY MEDICINE | Facility: CLINIC | Age: 83
End: 2018-03-19
Payer: MEDICARE

## 2018-03-19 VITALS
TEMPERATURE: 97.5 F | DIASTOLIC BLOOD PRESSURE: 47 MMHG | OXYGEN SATURATION: 97 % | SYSTOLIC BLOOD PRESSURE: 113 MMHG | HEART RATE: 58 BPM | BODY MASS INDEX: 31.93 KG/M2 | WEIGHT: 148 LBS | RESPIRATION RATE: 16 BRPM | HEIGHT: 57 IN

## 2018-03-19 DIAGNOSIS — G47.00 INSOMNIA, UNSPECIFIED TYPE: Primary | ICD-10-CM

## 2018-03-19 DIAGNOSIS — F41.9 ANXIETY: ICD-10-CM

## 2018-03-19 DIAGNOSIS — R31.9 HEMATURIA, UNSPECIFIED TYPE: ICD-10-CM

## 2018-03-19 DIAGNOSIS — R19.7 DIARRHEA, UNSPECIFIED TYPE: ICD-10-CM

## 2018-03-19 PROCEDURE — 99214 OFFICE O/P EST MOD 30 MIN: CPT | Performed by: FAMILY MEDICINE

## 2018-03-19 RX ORDER — TRAZODONE HYDROCHLORIDE 50 MG/1
50 TABLET, FILM COATED ORAL
Qty: 30 TABLET | Refills: 1 | Status: SHIPPED | OUTPATIENT
Start: 2018-03-19 | End: 2018-05-08

## 2018-03-19 NOTE — PROGRESS NOTES
SUBJECTIVE:   Dimple Oviedo is a 84 year old female who presents to clinic today for the following health issues:    ED/UC Followup:    Facility:  [facility]  Date of visit: 03/14/2018  Reason for visit: 03/15/2015  Current Status: still having loose stools, blood in urine, no blood in stool, 3-4 BM a day, losing a pound of weight a day, no abdominal pain, eating salt free diet, tried BRAT diet, but not helping.     Additional: pt would like to discuss propanolol side effects, she has similar sx pt states      Patient was recently in the emergency room with chest pain.  She was observed for 24 hours and no any cardiac etiology was found.  She has a history of heart failure.  She is frustrated her diarrhea is not improving and still having watery stool.  See her in Metamucil in her diet and now she is having somewhat of a semisolid stool.  She is not having any pain.  She is not having any bleeding.  She is still having anxiety.  Per daughter-in-law her anxiety and jitteriness is better.  Per patient's is still having significant muscle twitching and shakiness.  She is going to see a neurologist soon.  As per endocrine her thyroid is better controlled and it should not be the cause.  Patient is currently living at her son's house and daughter-in-law is primary caretaker for her.  She is currently feeling bored and she wishes to go back to her apartment but right now she understands she is not capable enough to go and live by herself.  We have previously discussed about cutting down on omeprazole which she tried but she could not and she is still using omeprazole on a regular basis.    She has been to urologist and they are waiting for her urine cytology results back.  She is still having blood in her urine, random episodes.    She is really frustrated as she is not getting enough sleep at nighttime.  She uses 1 dose of Valium yesterday because of her muscle twitching and she noticed improvement in her  anxiety.    Problem list and histories reviewed & adjusted, as indicated.  Additional history: as documented    Labs reviewed in EPIC    Reviewed and updated as needed this visit by clinical staff       Reviewed and updated as needed this visit by Provider      Social History     Social History     Marital status:      Spouse name:      Number of children: 2     Years of education: N/A     Occupational History      Retired     Social History Main Topics     Smoking status: Never Smoker     Smokeless tobacco: Never Used     Alcohol use No      Comment: 6 times per year-one drink     Drug use: No     Sexual activity: Yes     Partners: Male     Other Topics Concern      Service No     Blood Transfusions No     Exercise Yes     curves     Seat Belt Yes     Self-Exams Yes     Parent/Sibling W/ Cabg, Mi Or Angioplasty Before 65f 55m? No     Social History Narrative    December 7, 2009    Balanced Diet - Yes    Osteoporosis Prevention Measures - Dairy servings per day: 2 and Medication/Supplements (See current meds)    Regular Exercise -  Yes Describe curves, walking    Dental Exam - YES - Date: 10/2009    Eye Exam - YES - Date: 2008    Self Breast Exam - Yes    Abuse: Current or Past (Physical, Sexual or Emotional)- No    Do you feel safe in your environment - Yes    Guns stored in the home - No    Sunscreen used - Yes    Seatbelts used - Yes    Lipids -  YES - Date: 11/24/2009    Glucose -  YES - Date: 11/24/2009    Colon Cancer Screening - Colonoscopy 11/18/2005(date completed)    Hemoccults - YES - Date: 10/12/2004    Pap Test -  YES - Date: 09/22/2004    Do you have any concerns about STD's -  No    Mammography - YES - Date: 11/24/2009    DEXA - YES - Date: 11/20/2008    Immunizations reviewed and up to date - Yes    PAULETTE Spencer CMA                 Allergies   Allergen Reactions     No Known Drug Allergies      Patient Active Problem List   Diagnosis     Esophageal reflux     restless leg     heat  "intolerance     Goiter     Disorder of bone and cartilage     Other psoriasis     perirectal cyst     Malignant melanoma of skin of trunk, except scrotum (H)     Undiagnosed cardiac murmurs     Nontoxic multinodular goiter     Hyperlipidemia LDL goal <130     Hypertension goal BP (blood pressure) < 140/90     Urinary incontinence     Osteoarthritis of left knee     Hip arthritis     Polymyalgia rheumatica (H)     High risk medication use     Shoulder pain     Sepsis (H)     Impaired fasting blood sugar     Chronic bilateral low back pain without sciatica     Obesity, unspecified obesity severity, unspecified obesity type     Iron deficiency anemia, unspecified iron deficiency anemia type     NSTEMI (non-ST elevated myocardial infarction) (H)     Stress-induced cardiomyopathy     A-fib (H)     Atrial fibrillation, unspecified type (H)     Anxiety     Chronic systolic heart failure (H)     Reviewed medications, social history and  past medical and surgical history.    Review of system: for general, respiratory, CVS, GI and psychiatry negative except for noted above.     EXAM:  /47 (Cuff Size: Adult Regular)  Pulse 58  Temp 97.5  F (36.4  C) (Oral)  Resp 16  Ht 4' 9\" (1.448 m)  Wt 148 lb (67.1 kg)  SpO2 97%  BMI 32.03 kg/m2  Constitutional: , alert and no distress   Psychiatric: In tears on and off  Using walker     ASSESSMENT / PLAN:  (G47.00) Insomnia, unspecified type  (primary encounter diagnosis)  Comment: Certainly some of her medications can contribute but I think is mostly psychological in nature.  At this point I am not too excited about stopping her Zoloft as she has some to manage her anxiety.  Long-term benzodiazepines are a mean type of drugs and not ideal for her age.  We will consider trial of trazodone and see how she does.  We discussed about short-term and long-term side effects.  We will titrate up the dose up to 150 mg and see how she does.  She understood.  Plan: traZODone (DESYREL) 50 " MG tablet            (F41.9) Anxiety  Comment: Continue same dose of Zoloft for now.  Plan:     (R19.7) Diarrhea, unspecified type  Comment: Basic lab test for stools came back negative.  Persistent diarrhea.  She is scheduled to see gastroenterology at [facility] in July.  We discussed about seeing [place] [facility] and I am hoping she may be able to get in sooner.  She is quite frustrated with her symptoms.  I recommended continue with the Metamucil it is is causing bulking of her stool and okay to use Imodium as needed.  Plan: GASTROENTEROLOGY ADULT REF CONSULT ONLY            (R31.9) Hematuria, unspecified type  Comment:   Plan:  persistent.  Unfortunately her recent cytology test came back positive.  I explained result as per my ability.   She is unaware of the result.  I rated her result notes from her urologist.  Urology is going to consult urooncology and she is going to get back to her.

## 2018-03-19 NOTE — MR AVS SNAPSHOT
After Visit Summary   3/19/2018    Dimple Oviedo    MRN: [id number]           Patient Information     Date Of Birth          6/23/1933        Visit Information        Provider Department      3/19/2018 2:00 PM Pop Zapien MD [facility]        Today's Diagnoses     Insomnia, unspecified type    -  1    Anxiety        Diarrhea, unspecified type           Follow-ups after your visit        Additional Services     GASTROENTEROLOGY ADULT REF CONSULT ONLY       Preferred Location: [place] [facility] [phone]      Please be aware that coverage of these services is subject to the terms and limitations of your health insurance plan.  Call member services at your health plan with any benefit or coverage questions.  Any procedures must be performed at a [place] facility OR coordinated by your clinic's referral office.    Please bring the following with you to your appointment:    (1) Any X-Rays, CTs or MRIs which have been performed.  Contact the facility where they were done to arrange for  prior to your scheduled appointment.    (2) List of current medications   (3) This referral request   (4) Any documents/labs given to you for this referral                  Your next 10 appointments already scheduled     Mar 21, 2018 11:30 AM CDT   (Arrive by 11:15 AM)   New Patient Visit with Mushtaq Fajardo MD   [facility] Neurology ([facility])    [address] [place] [address]  [address] [facility] [address] [phone]            Apr 26, 2018 10:30 AM CDT   (Arrive by 10:15 AM)   Cystoscopy with Kenna La MD   [facility] Urology and Inst for Prostate and Urologic Cancers ([facility])    [address] [place] [address]  [address] [facility] [id number] [phone]            May 21, 2018 12:30 PM CDT   Lab with  LAB   [facility] Lab ([facility])    [address] [place] [address]  [address] [facility] [address]    [phone]            May 21, 2018  1:00 PM CDT   (Arrive by 12:45 PM)   CORE RETURN with NELSON Smart CNP   [facility] [place] [facility]    [address] [place] [address]  [address] [place] [id number] [phone]            May 29, 2018 10:00 AM CDT   (Arrive by 9:45 AM)   RETURN ENDOCRINE with Dhara Meza MD   [facility] Endocrinology ([facility] [place] [facility])    [address] [place] [address]  [address] [facility] [address]   [phone]            Jul 02, 2018 10:00 AM CDT   (Arrive by 9:45 AM)   New Patient Visit with Vinny Duran MD   [facility]enterology [facility] ([facility] [place] [facility])    [address] [place] [address]  [address] [facility] [id number] [phone]            Jul 05, 2018  1:30 PM CDT   (Arrive by 1:15 PM)   NEW ARRHYTHMIA with Butch Griffith MD   [facility] ([facility])    [address] [place] [address]  [address] [place] [id number] [phone]              Who to contact     If you have questions or need follow up information about today's clinic visit or your schedule please contact [facility] directly at [phone].  Normal or non-critical lab and imaging results will be communicated to you by [employer], letter or phone within 4 business days after the clinic has received the results. If you do not hear from us within 7 days, please contact the clinic through [employer] or phone. If you have a critical or abnormal lab result, we will notify you by phone as soon as possible.  Submit refill requests through [employer] or call your pharmacy and they will forward the refill request to us. Please allow 3 business days for your refill to be completed.          Additional Information About Your Visit        [employer] Information     [employer] gives you secure access to your electronic health record. If you see a primary care provider, you can also send messages to  "your care team and make appointments. If you have questions, please call your primary care clinic.  If you do not have a primary care provider, please call [phone] and they will assist you.        Care EveryWhere ID     This is your Care EveryWhere ID. This could be used by other organizations to access your [place] medical records  [id number]        Your Vitals Were     Pulse Temperature Respirations Height Pulse Oximetry BMI (Body Mass Index)    58 97.5  F (36.4  C) (Oral) 16 4' 9\" (1.448 m) 97% 32.03 kg/m2       Blood Pressure from Last 3 Encounters:   03/19/18 113/47   03/15/18 171/87   03/09/18 110/72    Weight from Last 3 Encounters:   03/19/18 148 lb (67.1 kg)   03/09/18 150 lb (68 kg)   03/08/18 149 lb (67.6 kg)              We Performed the Following     GASTROENTEROLOGY ADULT REF CONSULT ONLY          Today's Medication Changes          These changes are accurate as of 3/19/18  2:28 PM.  If you have any questions, ask your nurse or doctor.               Start taking these medicines.        Dose/Directions    traZODone 50 MG tablet   Commonly known as:  DESYREL   Used for:  Insomnia, unspecified type   Started by:  Pop Zapien MD        Dose:  50 mg   Take 1 tablet (50 mg) by mouth nightly as needed for sleep   Quantity:  30 tablet   Refills:  1         These medicines have changed or have updated prescriptions.        Dose/Directions    melatonin 3 MG tablet   This may have changed:  how much to take   Used for:  Insomnia        Dose:  3 mg   Take 1 tablet (3 mg) by mouth nightly as needed for sleep   Quantity:  90 tablet   Refills:  0         Stop taking these medicines if you haven't already. Please contact your care team if you have questions.     acetaminophen-codeine 300-30 MG per tablet   Commonly known as:  TYLENOL #3   Stopped by:  Pop Zapien MD                Where to get your medicines      These medications were sent to [id number] - " "[place], [place] - [address] [place] [address] AVE AT [facility] [address] [facility] [address]  [address] [place] [address] [place], [facility] [address]     Phone:  [phone]     traZODone 50 MG tablet                Primary Care Provider Office Phone # Fax #    Pop Antonino Zapien [phone] [address]  [facility] [address]        Equal Access to Services     GUNNER RODRIGUEZ : Hadii aad ku hadasho Soomaali, waaxda luqadaha, qaybta kaalmada adeegyada, waxay idiin hayaan adeeg kharash lafrancisco bey. So [facility] [phone].    ATENCIÓN: Si habla espvíctor, tiene a sierra disposición servicios gratuitos de asistencia lingüística. [facility] [phone].    We comply with applicable federal civil rights laws and [place] laws. We do not discriminate on the basis of race, color, national origin, age, disability, sex, sexual orientation, or gender identity.            Thank you!     Thank you for choosing [facility]  for your care. Our goal is always to provide you with excellent care. Hearing back from our patients is one way we can continue to improve our services. Please take a few minutes to complete the written survey that you may receive in the mail after your visit with us. Thank you!             Your Updated Medication List - Protect others around you: Learn how to safely use, store and throw away your medicines at www.disposemymeds.org.          This list is accurate as of 3/19/18  2:28 PM.  Always use your most recent med list.                   Brand Name Dispense Instructions for use Diagnosis    acetaminophen 650 MG 8 hour tablet     100 tablet    Take 650 mg by mouth every 8 hours as needed for mild pain or fever    Ascending cholangitis       alendronate 70 MG tablet    FOSAMAX    12 tablet    TAKE 1 TABLET EVERY 7 DAYS AT LEAST 60 MIN BEFORE BREAKFAST AS DIRECTED \"SEE PACKAGE FOR ADDITIONAL INSTRUCTIONS\"    High risk medication use, Osteopenia       ASPIRIN PO      Take 81 mg by mouth At Bedtime        " CALCIUM + D PO      Take by mouth 2 times daily        CRANBERRY CONCENTRATE PO      Take 1 capsule by mouth 2 times daily        cycloSPORINE 0.05 % ophthalmic emulsion    RESTASIS     Place 1 drop into both eyes 2 times daily        diazepam 2 MG tablet    VALIUM    20 tablet    Take 1 tablet (2 mg) by mouth every 12 hours as needed for anxiety or sleep    THERON (generalized anxiety disorder)       Fish Oil 500 MG Caps      Take 1 capsule by mouth 2 times daily        irbesartan 300 MG tablet    AVAPRO    90 tablet    TAKE 1 TABLET EVERY DAY    Essential hypertension with goal blood pressure less than 140/90       lovastatin 40 MG tablet    MEVACOR    90 tablet    TAKE 1 TABLET AT BEDTIME (HYPERLIPIDEMIA LDL GOAL  BELOW 130)    Hyperlipidemia LDL goal <130       melatonin 3 MG tablet     90 tablet    Take 1 tablet (3 mg) by mouth nightly as needed for sleep    Insomnia       METAMUCIL FIBER PO      Take by mouth 3 times daily        methimazole 5 MG tablet    TAPAZOLE    90 tablet    Take 1 tablet (5 mg) by mouth daily    Nontoxic multinodular goiter       nitroGLYcerin 0.4 MG sublingual tablet    NITROSTAT    25 tablet    For chest pain place 1 tablet under the tongue every 5 minutes for 3 doses. If symptoms persist 5 minutes after 1st dose call 911.    Elevated troponin       omeprazole 20 MG CR capsule    priLOSEC    180 capsule    Take 1 capsule (20 mg) by mouth daily    Gastroesophageal reflux disease without esophagitis       propranolol 60 MG 24 hr capsule    INDERAL LA    90 capsule    Take 1 capsule (60 mg) by mouth daily    Nontoxic multinodular goiter       sertraline 100 MG tablet    ZOLOFT    90 tablet    Take 1 tablet (100 mg) by mouth daily    THERON (generalized anxiety disorder)       SYSTANE 0.4-0.3 % Soln ophthalmic solution   Generic drug:  polyethylene glycol 0.4%- propylene glycol 0.3%      Place 1 drop into both eyes 3 times daily as needed for dry eyes        traZODone 50 MG tablet    DESYREL     30 tablet    Take 1 tablet (50 mg) by mouth nightly as needed for sleep    Insomnia, unspecified type

## 2018-03-20 ENCOUNTER — TELEPHONE (OUTPATIENT)
Dept: ENDOCRINOLOGY | Facility: CLINIC | Age: 83
End: 2018-03-20

## 2018-03-20 NOTE — TELEPHONE ENCOUNTER
Dimple was notified  That neither one of the 2 drugs she is on for hyperthyroid should cause diarrhea. She will be following up  with GI.

## 2018-03-20 NOTE — TELEPHONE ENCOUNTER
----- Message from Dhara Meza MD sent at 3/20/2018  3:08 PM CDT -----  Regarding: RE: propranolo side effects ?  Contact: [phone]  Neither of these drugs should cause diarrhea.  She should be evaluated for it by her PCP or GI  ----- Message -----     From: Yas Ahuja RN     Sent: 3/20/2018   2:22 PM       To: Dhara Meza MD  Subject: propranolo side effects ?                        Calling with Diarrhea on  Propranolol 60 mg  questioning if it could be this medication.  She will be seeing GI.  Have you heard of this med causing  Diarrhea ?   The propranolol  and Methimazole were the 2 new  meds at the time it started

## 2018-03-22 ENCOUNTER — TELEPHONE (OUTPATIENT)
Dept: FAMILY MEDICINE | Facility: CLINIC | Age: 83
End: 2018-03-22

## 2018-03-22 ENCOUNTER — OFFICE VISIT (OUTPATIENT)
Dept: FAMILY MEDICINE | Facility: CLINIC | Age: 83
End: 2018-03-22
Payer: MEDICARE

## 2018-03-22 VITALS
BODY MASS INDEX: 32.24 KG/M2 | TEMPERATURE: 97.6 F | OXYGEN SATURATION: 100 % | HEART RATE: 54 BPM | WEIGHT: 149 LBS | RESPIRATION RATE: 20 BRPM

## 2018-03-22 DIAGNOSIS — R31.9 URINARY TRACT INFECTION WITH HEMATURIA, SITE UNSPECIFIED: Primary | ICD-10-CM

## 2018-03-22 DIAGNOSIS — N39.0 URINARY TRACT INFECTION WITH HEMATURIA, SITE UNSPECIFIED: Primary | ICD-10-CM

## 2018-03-22 DIAGNOSIS — C68.9 UROTHELIAL CARCINOMA (H): ICD-10-CM

## 2018-03-22 LAB
ALBUMIN UR-MCNC: 30 MG/DL
APPEARANCE UR: ABNORMAL
BACTERIA #/AREA URNS HPF: ABNORMAL /HPF
BILIRUB UR QL STRIP: NEGATIVE
COLOR UR AUTO: YELLOW
GLUCOSE UR STRIP-MCNC: NEGATIVE MG/DL
HGB UR QL STRIP: ABNORMAL
KETONES UR STRIP-MCNC: NEGATIVE MG/DL
LEUKOCYTE ESTERASE UR QL STRIP: ABNORMAL
NITRATE UR QL: POSITIVE
NON-SQ EPI CELLS #/AREA URNS LPF: ABNORMAL /LPF
PH UR STRIP: 5.5 PH (ref 5–7)
RBC #/AREA URNS AUTO: ABNORMAL /HPF
SOURCE: ABNORMAL
SP GR UR STRIP: 1.01 (ref 1–1.03)
UROBILINOGEN UR STRIP-ACNC: 0.2 EU/DL (ref 0.2–1)
WBC #/AREA URNS AUTO: ABNORMAL /HPF

## 2018-03-22 PROCEDURE — 87088 URINE BACTERIA CULTURE: CPT | Performed by: FAMILY MEDICINE

## 2018-03-22 PROCEDURE — 87186 SC STD MICRODIL/AGAR DIL: CPT | Performed by: FAMILY MEDICINE

## 2018-03-22 PROCEDURE — 87086 URINE CULTURE/COLONY COUNT: CPT | Performed by: FAMILY MEDICINE

## 2018-03-22 PROCEDURE — 99213 OFFICE O/P EST LOW 20 MIN: CPT | Performed by: FAMILY MEDICINE

## 2018-03-22 PROCEDURE — 81001 URINALYSIS AUTO W/SCOPE: CPT | Performed by: FAMILY MEDICINE

## 2018-03-22 RX ORDER — SULFAMETHOXAZOLE/TRIMETHOPRIM 800-160 MG
1 TABLET ORAL 2 TIMES DAILY
Qty: 14 TABLET | Refills: 0 | Status: SHIPPED | OUTPATIENT
Start: 2018-03-22 | End: 2018-03-29

## 2018-03-22 NOTE — MR AVS SNAPSHOT
After Visit Summary   3/22/2018    Dimple Oviedo    MRN: [id number]           Patient Information     Date Of Birth          6/23/1933        Visit Information        Provider Department      3/22/2018 3:20 PM Mary Denise MD [facility]        Today's Diagnoses     Urinary tract infection with hematuria, site unspecified    -  1    Urothelial carcinoma (H)           Follow-ups after your visit        Your next 10 appointments already scheduled     Apr 26, 2018 10:30 AM CDT   (Arrive by 10:15 AM)   Cystoscopy with Kenna La MD   [facility]rolog[facility] ([facility])    [address] [place] [address]  4th Floor  [facility] [address] [phone]            May 21, 2018 12:30 PM CDT   Lab with  LAB   [facility] ([facility])    [address] [place] [address]  1st Floor  [facility] [address] [phone]            May 21, 2018  1:00 PM CDT   (Arrive by 12:45 PM)   CORE RETURN with NELSON Smart CNP   [facility] ([facility])    [address] [place] [address]  [address]  [facility] [address] [phone]            May 29, 2018 10:00 AM CDT   (Arrive by 9:45 AM)   RETURN ENDOCRINE with Dhara Meza MD   [facility]ology ([facility])    [address] [place] [address]  3rd Floor  [facility] [address] [phone]            Jul 02, 2018 10:00 AM CDT   (Arrive by 9:45 AM)   New Patient Visit with Vinny Duran MD   [facility] ([facility])    [address] [place] [address]  4th Floor  [facility] [address]   [phone]            Jul 05, 2018  1:30 PM CDT   (Arrive by 1:15 PM)   NEW ARRHYTHMIA with Butch Griffith MD   [facility] ([facility])    [address] [place] [address]  [address]  [facility] [address] [phone]               Future tests that were ordered for you today     Open Future Orders        Priority Expected Expires Ordered    UA with Microscopic Routine 3/29/2018 3/22/2019 3/22/2018    Urine Culture  Routine ([facility]) Routine  3/29/2018 3/22/2018            Who to contact     If you have questions or need follow up information about today's clinic visit or your schedule please contact [facility] directly at [phone].  Normal or non-critical lab and imaging results will be communicated to you by Bill Me Laterhart, letter or phone within 4 business days after the clinic has received the results. If you do not hear from us within 7 days, please contact the clinic through [employer] or phone. If you have a critical or abnormal lab result, we will notify you by phone as soon as possible.  Submit refill requests through [employer] or call your pharmacy and they will forward the refill request to us. Please allow 3 business days for your refill to be completed.          Additional Information About Your Visit        [employer] Information     [employer] gives you secure access to your electronic health record. If you see a primary care provider, you can also send messages to your care team and make appointments. If you have questions, please call your primary care clinic.  If you do not have a primary care provider, please call [phone] and they will assist you.        Care EveryWhere ID     This is your Care EveryWhere ID. This could be used by other organizations to access your [facility] medical records  [id number]        Your Vitals Were     Pulse Temperature Respirations Pulse Oximetry BMI (Body Mass Index)       54 97.6  F (36.4  C) (Oral) 20 100% 32.24 kg/m2        Blood Pressure from Last 3 Encounters:   03/19/18 113/47   03/15/18 171/87   03/09/18 110/72    Weight from Last 3 Encounters:   03/22/18 149 lb (67.6 kg)   03/19/18 148 lb (67.1 kg)   03/09/18 150 lb (68 kg)              We Performed the Following     UA  reflex to Microscopic and Culture     Urine Culture Aerobic Bacterial     Urine Microscopic          Today's Medication Changes          These changes are accurate as of 3/22/18  4:09 PM.  If you have any questions, ask your nurse or doctor.               Start taking these medicines.        Dose/Directions    sulfamethoxazole-trimethoprim 800-160 MG per tablet   Commonly known as:  BACTRIM DS/SEPTRA DS   Used for:  Urinary tract infection with hematuria, site unspecified   Started by:  Mary Denise MD        Dose:  1 tablet   Take 1 tablet by mouth 2 times daily for 7 days   Quantity:  14 tablet   Refills:  0         These medicines have changed or have updated prescriptions.        Dose/Directions    melatonin 3 MG tablet   This may have changed:  how much to take   Used for:  Insomnia        Dose:  3 mg   Take 1 tablet (3 mg) by mouth nightly as needed for sleep   Quantity:  90 tablet   Refills:  0            Where to get your medicines      These medications were sent to [facility] [place] [id number]  [facility] [facility] [address]     Phone:  [phone]     sulfamethoxazole-trimethoprim 800-160 MG per tablet                Primary Care Provider Office Phone # Fax #    Pop Antonino Zapien [phone]       [address]  [facility] [address]        Equal Access to Services     GUNNER RODRIGUEZ AH: Aleja funko Sogabo, waaxda luqadaha, qaybta kaalmada adeegyada, maldonado bey. So [facility] [phone].    ATENCIÓN: Si habla español, tiene a sierra disposición servicios gratuitos de asistencia lingüística. Llame al [phone].    We comply with applicable federal civil rights laws and [place] laws. We do not discriminate on the basis of race, color, national origin, age, disability, sex, sexual orientation, or gender identity.            Thank you!     Thank you for choosing [facility]  for your  "care. Our goal is always to provide you with excellent care. Hearing back from our patients is one way we can continue to improve our services. Please take a few minutes to complete the written survey that you may receive in the mail after your visit with us. Thank you!             Your Updated Medication List - Protect others around you: Learn how to safely use, store and throw away your medicines at www.disposemymeds.org.          This list is accurate as of 3/22/18  4:09 PM.  Always use your most recent med list.                   Brand Name Dispense Instructions for use Diagnosis    acetaminophen 650 MG 8 hour tablet     100 tablet    Take 650 mg by mouth every 8 hours as needed for mild pain or fever    Ascending cholangitis       alendronate 70 MG tablet    FOSAMAX    12 tablet    TAKE 1 TABLET EVERY 7 DAYS AT LEAST 60 MIN BEFORE BREAKFAST AS DIRECTED \"SEE PACKAGE FOR ADDITIONAL INSTRUCTIONS\"    High risk medication use, Osteopenia       ASPIRIN PO      Take 81 mg by mouth At Bedtime        CALCIUM + D PO      Take by mouth 2 times daily        CRANBERRY CONCENTRATE PO      Take 1 capsule by mouth 2 times daily        cycloSPORINE 0.05 % ophthalmic emulsion    RESTASIS     Place 1 drop into both eyes 2 times daily        diazepam 2 MG tablet    VALIUM    20 tablet    Take 1 tablet (2 mg) by mouth every 12 hours as needed for anxiety or sleep    THERON (generalized anxiety disorder)       Fish Oil 500 MG Caps      Take 1 capsule by mouth 2 times daily        irbesartan 300 MG tablet    AVAPRO    90 tablet    TAKE 1 TABLET EVERY DAY    Essential hypertension with goal blood pressure less than 140/90       lovastatin 40 MG tablet    MEVACOR    90 tablet    TAKE 1 TABLET AT BEDTIME (HYPERLIPIDEMIA LDL GOAL  BELOW 130)    Hyperlipidemia LDL goal <130       melatonin 3 MG tablet     90 tablet    Take 1 tablet (3 mg) by mouth nightly as needed for sleep    Insomnia       METAMUCIL FIBER PO      Take by mouth 3 times " daily        methimazole 5 MG tablet    TAPAZOLE    90 tablet    Take 1 tablet (5 mg) by mouth daily    Nontoxic multinodular goiter       nitroGLYcerin 0.4 MG sublingual tablet    NITROSTAT    25 tablet    For chest pain place 1 tablet under the tongue every 5 minutes for 3 doses. If symptoms persist 5 minutes after 1st dose call 911.    Elevated troponin       omeprazole 20 MG CR capsule    priLOSEC    180 capsule    Take 1 capsule (20 mg) by mouth daily    Gastroesophageal reflux disease without esophagitis       propranolol 60 MG 24 hr capsule    INDERAL LA    90 capsule    Take 1 capsule (60 mg) by mouth daily    Nontoxic multinodular goiter       sertraline 100 MG tablet    ZOLOFT    90 tablet    Take 1 tablet (100 mg) by mouth daily    THERON (generalized anxiety disorder)       sulfamethoxazole-trimethoprim 800-160 MG per tablet    BACTRIM DS/SEPTRA DS    14 tablet    Take 1 tablet by mouth 2 times daily for 7 days    Urinary tract infection with hematuria, site unspecified       SYSTANE 0.4-0.3 % Soln ophthalmic solution   Generic drug:  polyethylene glycol 0.4%- propylene glycol 0.3%      Place 1 drop into both eyes 3 times daily as needed for dry eyes        traZODone 50 MG tablet    DESYREL    30 tablet    Take 1 tablet (50 mg) by mouth nightly as needed for sleep    Insomnia, unspecified type

## 2018-03-22 NOTE — TELEPHONE ENCOUNTER
"Writer called, initially spoke with daughter-in-law, who got patient on phone.    Writer spoke with patient who stated:  1. Has \"bladder problem\" and getting a biopsy sometime in the future   A. Passes blood in urine frequently  2. Urine started to look cloudy yesterday and was malodorous  3. Does not feel sick and afebrile    Writer did explain to patient importance of office visit for evaluation and offered multiple appts today with multiple providers.    Patient verbalized understanding and in agreement with plan.    Appt scheduled with Dr. Denise at 1520 today.  Appt date, time and location confirmed with patient.    SHASHANK MurrellN, RN    "

## 2018-03-22 NOTE — PROGRESS NOTES
Patient was seen today in clinic.  I discussed results in clinic, please see clinic progress note.    Mary Denise 3/22/2018

## 2018-03-22 NOTE — PROGRESS NOTES
SUBJECTIVE:   Dimple Oviedo is a 84 year old female who presents to clinic today for the following health issues:    URINARY TRACT SYMPTOMS      Duration: x yesterday    Description  Hematuria, smell and urine looks cloudy    Accompanying signs and symptoms:  Fever/chills: no   Flank pain no   Nausea and vomiting: no   Vaginal symptoms: none  Abdominal/Pelvic Pain: no     History  History of frequent UTI's: YES  History of kidney stones: YES- once  Sexually Active: no   Possibility of pregnancy: No    Precipitating or alleviating factors: None    Therapies tried and outcome: increase fluid intake   Outcome: n/a        Problem list and histories reviewed & adjusted, as indicated.  Additional history: as documented    Patient Active Problem List   Diagnosis     Esophageal reflux     restless leg     heat intolerance     Goiter     Disorder of bone and cartilage     Other psoriasis     perirectal cyst     Malignant melanoma of skin of trunk, except scrotum (H)     Undiagnosed cardiac murmurs     Nontoxic multinodular goiter     Hyperlipidemia LDL goal <130     Hypertension goal BP (blood pressure) < 140/90     Urinary incontinence     Osteoarthritis of left knee     Hip arthritis     Polymyalgia rheumatica (H)     High risk medication use     Shoulder pain     Sepsis (H)     Impaired fasting blood sugar     Chronic bilateral low back pain without sciatica     Obesity, unspecified obesity severity, unspecified obesity type     Iron deficiency anemia, unspecified iron deficiency anemia type     NSTEMI (non-ST elevated myocardial infarction) (H)     Stress-induced cardiomyopathy     A-fib (H)     Atrial fibrillation, unspecified type (H)     Anxiety     Chronic systolic heart failure (H)     Urothelial carcinoma (H)     Past Surgical History:   Procedure Laterality Date     C NONSPECIFIC PROCEDURE  2005    colonoscopy polyp repeat 2010     CYSTOSCOPY, BIOPSY BLADDER, COMBINED N/A 2/19/2018    Procedure: COMBINED  CYSTOSCOPY, BIOPSY BLADDER;  Cystoscopy, Bladder Biopsy;  Surgeon: Kenna La MD;  Location: [facility] OR     ENDOSCOPIC ULTRASOUND LOWER GASTROINTESTIONAL TRACT (GI) N/A 10/30/2015    Procedure: ENDOSCOPIC ULTRASOUND LOWER GASTROINTESTIONAL TRACT (GI);  Surgeon: Daniel Jean-Baptiste MD;  Location: [facility] OR     LAPAROSCOPIC CHOLECYSTECTOMY WITH CHOLANGIOGRAMS N/A 11/1/2015    Procedure: LAPAROSCOPIC CHOLECYSTECTOMY WITH CHOLANGIOGRAMS;  Surgeon: Tonie Warren MD;  Location: [facility] OR     SURGICAL HISTORY OF -   1996    malignant melanoma     SURGICAL HISTORY OF -   1968    thyroid nodule     SURGICAL HISTORY OF -       D & C       Social History   Substance Use Topics     Smoking status: Never Smoker     Smokeless tobacco: Never Used     Alcohol use No      Comment: 6 times per year-one drink     Family History   Problem Relation Age of Onset     CANCER Father      dec - esophageal and laryngeal     HEART DISEASE Mother      Respiratory Mother      dec         Current Outpatient Prescriptions   Medication Sig Dispense Refill     sulfamethoxazole-trimethoprim (BACTRIM DS/SEPTRA DS) 800-160 MG per tablet Take 1 tablet by mouth 2 times daily for 7 days 14 tablet 0     Calcium Citrate-Vitamin D (CALCIUM + D PO) Take by mouth 2 times daily       Psyllium (METAMUCIL FIBER PO) Take by mouth 3 times daily       traZODone (DESYREL) 50 MG tablet Take 1 tablet (50 mg) by mouth nightly as needed for sleep 30 tablet 1     sertraline (ZOLOFT) 100 MG tablet Take 1 tablet (100 mg) by mouth daily 90 tablet 1     irbesartan (AVAPRO) 300 MG tablet TAKE 1 TABLET EVERY DAY 90 tablet 2     propranolol (INDERAL LA) 60 MG 24 hr capsule Take 1 capsule (60 mg) by mouth daily 90 capsule 2     methimazole (TAPAZOLE) 5 MG tablet Take 1 tablet (5 mg) by mouth daily 90 tablet 1     diazepam (VALIUM) 2 MG tablet Take 1 tablet (2 mg) by mouth every 12 hours as needed for anxiety or sleep 20 tablet 0     ASPIRIN PO Take 81 mg by mouth At  "Bedtime       cycloSPORINE (RESTASIS) 0.05 % ophthalmic emulsion Place 1 drop into both eyes 2 times daily       polyethylene glycol 0.4%- propylene glycol 0.3% (SYSTANE) 0.4-0.3 % SOLN ophthalmic solution Place 1 drop into both eyes 3 times daily as needed for dry eyes       nitroGLYcerin (NITROSTAT) 0.4 MG sublingual tablet For chest pain place 1 tablet under the tongue every 5 minutes for 3 doses. If symptoms persist 5 minutes after 1st dose call 911. 25 tablet 3     CRANBERRY CONCENTRATE PO Take 1 capsule by mouth 2 times daily       lovastatin (MEVACOR) 40 MG tablet TAKE 1 TABLET AT BEDTIME (HYPERLIPIDEMIA LDL GOAL  BELOW 130) 90 tablet 2     omeprazole (PRILOSEC) 20 MG CR capsule Take 1 capsule (20 mg) by mouth daily 180 capsule 3     alendronate (FOSAMAX) 70 MG tablet TAKE 1 TABLET EVERY 7 DAYS AT LEAST 60 MIN BEFORE BREAKFAST AS DIRECTED \"SEE PACKAGE FOR ADDITIONAL INSTRUCTIONS\" 12 tablet 2     acetaminophen 650 MG TABS Take 650 mg by mouth every 8 hours as needed for mild pain or fever 100 tablet 0     Omega-3 Fatty Acids (FISH OIL) 500 MG CAPS Take 1 capsule by mouth 2 times daily        melatonin 3 MG tablet Take 1 tablet (3 mg) by mouth nightly as needed for sleep (Patient taking differently: Take 10 mg by mouth nightly as needed for sleep ) 90 tablet 0     Allergies   Allergen Reactions     No Known Drug Allergies      BP Readings from Last 3 Encounters:   03/19/18 113/47   03/15/18 171/87   03/09/18 110/72    Wt Readings from Last 3 Encounters:   03/22/18 149 lb (67.6 kg)   03/19/18 148 lb (67.1 kg)   03/09/18 150 lb (68 kg)                    Reviewed and updated as needed this visit by clinical staff       Reviewed and updated as needed this visit by Provider         ROS:  Constitutional, HEENT, cardiovascular, pulmonary, gi and gu systems are negative, except as otherwise noted.    OBJECTIVE:     Pulse 54  Temp 97.6  F (36.4  C) (Oral)  Resp 20  Wt 149 lb (67.6 kg)  SpO2 100%  BMI 32.24 " kg/m2  Body mass index is 32.24 kg/(m^2).  She is accompanied by her daughter from [place] today.  OBJECTIVE: Appears well, in no apparent distress.  Vital signs are normal. The abdomen is soft without tenderness, guarding, mass, rebound or organomegaly. No CVA tenderness or inguinal adenopathy noted.  Psych: anxious, tearful re: recent diagnosis, also states that people don't care about you when you're 85 years old.  Daughter is appropriately reassuring.     Results for orders placed or performed in visit on 03/22/18   UA reflex to Microscopic and Culture   Result Value Ref Range    Color Urine Yellow     Appearance Urine Cloudy     Glucose Urine Negative NEG^Negative mg/dL    Bilirubin Urine Negative NEG^Negative    Ketones Urine Negative NEG^Negative mg/dL    Specific Gravity Urine 1.010 1.003 - 1.035    Blood Urine Large (A) NEG^Negative    pH Urine 5.5 5.0 - 7.0 pH    Protein Albumin Urine 30 (A) NEG^Negative mg/dL    Urobilinogen Urine 0.2 0.2 - 1.0 EU/dL    Nitrite Urine Positive (A) NEG^Negative    Leukocyte Esterase Urine Moderate (A) NEG^Negative    Source Midstream Urine    Urine Microscopic   Result Value Ref Range    WBC Urine  (A) OTO5^0 - 5 /HPF    RBC Urine 25-50 (A) OTO2^O - 2 /HPF    Squamous Epithelial /LPF Urine Few FEW^Few /LPF    Bacteria Urine Many (A) NEG^Negative /HPF      ASSESSMENT / PLAN:  (N39.0,  R31.9) Urinary tract infection with hematuria, site unspecified  (primary encounter diagnosis)  Comment: possibly caused by recent diarrhea  See orders  Continue to drink plenty of fluids  Return to clinic 1 week for repeat urine labs to make sure infection has cleared  Plan: sulfamethoxazole-trimethoprim (BACTRIM         DS/SEPTRA DS) 800-160 MG per tablet, UA with         Microscopic, Urine Culture  Routine ([facility])            (C68.9) Urothelial carcinoma (H)  Comment: new diagnosis, further work up/.treatment pending per urologist  Plan: UA with Microscopic, Urine Culture   Routine         ([facility])

## 2018-03-26 DIAGNOSIS — I10 ESSENTIAL HYPERTENSION WITH GOAL BLOOD PRESSURE LESS THAN 140/90: ICD-10-CM

## 2018-03-26 LAB
BACTERIA SPEC CULT: ABNORMAL
BACTERIA SPEC CULT: ABNORMAL
Lab: ABNORMAL
SPECIMEN SOURCE: ABNORMAL

## 2018-03-26 NOTE — TELEPHONE ENCOUNTER
"Requested Prescriptions   Pending Prescriptions Disp Refills     irbesartan (AVAPRO) 300 MG tablet [Pharmacy Med Name: IRBESARTAN 300 MG Tablet]  Last Written Prescription Date:  3/9/2018  Last Fill Quantity: 90 TABLET,  # refills: 2   Last Office Visit: 3/22/2018   Future Office Visit:      90 tablet 0     Sig: TAKE 1 TABLET EVERY DAY    Angiotensin-II Receptors Passed    3/26/2018 10:14 AM       Passed - Blood pressure under 140/90 in past 12 months    BP Readings from Last 3 Encounters:   03/19/18 113/47   03/15/18 171/87   03/09/18 110/72          Passed - Recent (12 mo) or future (30 days) visit within the authorizing provider's specialty    Patient had office visit in the last 12 months or has a visit in the next 30 days with authorizing provider or within the authorizing provider's specialty.  See \"Patient Info\" tab in inbasket, or \"Choose Columns\" in Meds & Orders section of the refill encounter.           Passed - Patient is age 18 or older       Passed - No active pregnancy on record       Passed - Normal serum creatinine on file in past 12 months    Recent Labs   Lab Test  03/15/18   0753   CR  1.00          Passed - Normal serum potassium on file in past 12 months    Recent Labs   Lab Test  03/15/18   0753   POTASSIUM  4.9           Passed - No positive pregnancy test in past 12 months          "

## 2018-03-27 ENCOUNTER — CARE COORDINATION (OUTPATIENT)
Dept: CARDIOLOGY | Facility: CLINIC | Age: 83
End: 2018-03-27

## 2018-03-27 ENCOUNTER — TELEPHONE (OUTPATIENT)
Dept: UROLOGY | Facility: CLINIC | Age: 83
End: 2018-03-27

## 2018-03-27 DIAGNOSIS — R31.0 GROSS HEMATURIA: Primary | ICD-10-CM

## 2018-03-27 DIAGNOSIS — R82.89 POSITIVE URINARY CYTOLOGY: ICD-10-CM

## 2018-03-27 DIAGNOSIS — I50.23 ACUTE ON CHRONIC SYSTOLIC CONGESTIVE HEART FAILURE (H): Primary | ICD-10-CM

## 2018-03-27 RX ORDER — IRBESARTAN 300 MG/1
TABLET ORAL
Qty: 90 TABLET | Refills: 0 | OUTPATIENT
Start: 2018-03-27

## 2018-03-27 RX ORDER — FUROSEMIDE 20 MG
TABLET ORAL
Qty: 30 TABLET | Refills: 1 | Status: SHIPPED | OUTPATIENT
Start: 2018-03-27 | End: 2018-03-29

## 2018-03-27 NOTE — PROGRESS NOTES
Patient's daughter calling in to request 90 day supply of Lasix 10mg daily through [facility]. Upon review of chart, this was discontinued at discharge from hospitalization on 3/15/18. She has been taking this dose since Elizabeth Richards NP instructed her to in February and feels fine.    Will route to [facility] to clarify. Call Day either way and can leave . ([phone]).

## 2018-03-27 NOTE — TELEPHONE ENCOUNTER
Daughter  in law called back to confirm all of the appts. Surgery packet is being FED-X out today.

## 2018-03-27 NOTE — TELEPHONE ENCOUNTER
Spoke to the patient surgery scheduled for 04/03/18 at 11:30am with a 9:30am check in at the [facility]. PAC scheduled for 03/29/18 at 10:30am. Patient asked that I call her daughter an law Day. Left her a Voice mail with surgery details. Left my direct number for any questions. Surgery packet being Fed-x out today.

## 2018-03-27 NOTE — PROGRESS NOTES
Day called into triage asking for Lasix refill. Clarified with Elizabeth Richards NP who states,     I actually thought she could dc it altogether but she wanted to cut it in half first. Looks like she's had recent diarrhea and a UTI so I'd be inclined to hold it altogether. She had a stress cardiomyopathy that completely resolved so her EF is normal now and she shouldn't need it. Why don't we recommend she take 10 mg daily prn for weight gain >3 lb in one day or 5 lb in one week or for LE edema - if they're ok w that. I think she's at risk of dehydration more so than congestion. I will talk to her about it next time I see her.      Per triage ok to leave a detailed voicemail. Left VM for daughter with this information and that I would send an updated script through to pharmacy. Please call with questions.   Date: 3/27/2018    Time of Call: 2:55 PM     Diagnosis:  Systolic heart failure      [ VORB ] Ordering provider: Elizabeth Richards NP  Order: Lasix 10 mg daily PRN for weight gain >3 lbs in a day or 5 lbs in a week or LE edema.      Order received by: Charu Conner RN      Follow-up/additional notes:        Charu Conner RN

## 2018-03-29 ENCOUNTER — APPOINTMENT (OUTPATIENT)
Dept: SURGERY | Facility: CLINIC | Age: 83
End: 2018-03-29
Payer: MEDICARE

## 2018-03-29 ENCOUNTER — ALLIED HEALTH/NURSE VISIT (OUTPATIENT)
Dept: SURGERY | Facility: CLINIC | Age: 83
End: 2018-03-29
Payer: MEDICARE

## 2018-03-29 ENCOUNTER — OFFICE VISIT (OUTPATIENT)
Dept: SURGERY | Facility: CLINIC | Age: 83
End: 2018-03-29
Payer: MEDICARE

## 2018-03-29 ENCOUNTER — ANESTHESIA EVENT (OUTPATIENT)
Dept: SURGERY | Facility: CLINIC | Age: 83
End: 2018-03-29
Payer: MEDICARE

## 2018-03-29 VITALS
HEIGHT: 57 IN | WEIGHT: 148.2 LBS | TEMPERATURE: 97.6 F | DIASTOLIC BLOOD PRESSURE: 66 MMHG | HEART RATE: 62 BPM | SYSTOLIC BLOOD PRESSURE: 116 MMHG | BODY MASS INDEX: 31.97 KG/M2 | RESPIRATION RATE: 18 BRPM | OXYGEN SATURATION: 97 %

## 2018-03-29 DIAGNOSIS — R31.9 URINARY TRACT INFECTION WITH HEMATURIA, SITE UNSPECIFIED: ICD-10-CM

## 2018-03-29 DIAGNOSIS — N39.0 URINARY TRACT INFECTION WITH HEMATURIA, SITE UNSPECIFIED: ICD-10-CM

## 2018-03-29 DIAGNOSIS — C68.9 UROTHELIAL CARCINOMA (H): ICD-10-CM

## 2018-03-29 DIAGNOSIS — R31.0 GROSS HEMATURIA: ICD-10-CM

## 2018-03-29 DIAGNOSIS — Z01.818 PRE-OPERATIVE GENERAL PHYSICAL EXAMINATION: Primary | ICD-10-CM

## 2018-03-29 DIAGNOSIS — E05.90 HYPERTHYROIDISM: ICD-10-CM

## 2018-03-29 LAB
ALBUMIN UR-MCNC: 30 MG/DL
APPEARANCE UR: ABNORMAL
BACTERIA #/AREA URNS HPF: ABNORMAL /HPF
BILIRUB UR QL STRIP: NEGATIVE
COLOR UR AUTO: YELLOW
GLUCOSE UR STRIP-MCNC: NEGATIVE MG/DL
HGB BLD-MCNC: 12.9 G/DL (ref 11.7–15.7)
HGB UR QL STRIP: ABNORMAL
KETONES UR STRIP-MCNC: NEGATIVE MG/DL
LEUKOCYTE ESTERASE UR QL STRIP: ABNORMAL
MUCOUS THREADS #/AREA URNS LPF: PRESENT /LPF
NITRATE UR QL: NEGATIVE
PH UR STRIP: 6 PH (ref 5–7)
POTASSIUM SERPL-SCNC: 5.2 MMOL/L (ref 3.4–5.3)
RBC #/AREA URNS AUTO: >182 /HPF (ref 0–2)
SOURCE: ABNORMAL
SP GR UR STRIP: 1.01 (ref 1–1.03)
T4 FREE SERPL-MCNC: 0.69 NG/DL (ref 0.76–1.46)
TRANS CELLS #/AREA URNS HPF: 3 /HPF
TSH SERPL DL<=0.005 MIU/L-ACNC: 2.72 MU/L (ref 0.4–4)
UROBILINOGEN UR STRIP-MCNC: 0 MG/DL (ref 0–2)
WBC #/AREA URNS AUTO: >182 /HPF (ref 0–5)

## 2018-03-29 PROCEDURE — 87186 SC STD MICRODIL/AGAR DIL: CPT | Performed by: FAMILY MEDICINE

## 2018-03-29 PROCEDURE — 87086 URINE CULTURE/COLONY COUNT: CPT | Performed by: FAMILY MEDICINE

## 2018-03-29 PROCEDURE — 87088 URINE BACTERIA CULTURE: CPT | Performed by: FAMILY MEDICINE

## 2018-03-29 RX ORDER — PHENOL 1.4 %
10 AEROSOL, SPRAY (ML) MUCOUS MEMBRANE PRN
COMMUNITY
End: 2019-01-02

## 2018-03-29 NOTE — H&P
Pre-Operative H & P     CC:  Preoperative exam to assess for increased cardiopulmonary risk while undergoing surgery and anesthesia.    Date of Encounter: 3/29/2018  Primary Care Physician:  Pop Zapien JAMMIE Oviedo is a 84 year old female who presents for pre-operative H & P in preparation for cystoscopy, blue light, bladder biopsies, pyelogram and ureteroscopy, with Dr. Stuart King on 4/3/18,  at [facility], in diagnosis and treatment of urothelial cancer. She began to have blood in the urine after being put on xarelto for atrial fibrillation. It was stopped within 24 hours. She has had urine cytology which was positive for cancer and a cystoscopy with bladder biopsy of a tumor that was negative. This repeat procedure to re evaluate.  UTI treated with bactirm 2 weeks ago. She continues with daily hematuria but no dysuria.      History is obtained from the patient and her daughter-in-law    Past Medical History  Past Medical History:   Diagnosis Date     Calculus of kidney      GERD (gastroesophageal reflux disease)     Hypertension      Hyperlipidemia LDL goal <130 5/9/2010     Malignant melanoma of skin of trunk, except scrotum (H)      Nontoxic multinodular goiter       Osteopenia      Other psoriasis      Personal history of colonic polyps      PMR (polymyalgia rheumatica) (H)      Stress-induced cardiomyopathy 12/2017     Aortic stenosis     Atrial fibrillation      Hyperthyroidism currently on tapazole     anxiety      Unspecified constipation        Past Surgical History  Past Surgical History:   Procedure Laterality Date     C NONSPECIFIC PROCEDURE  2005    colonoscopy polyp repeat 2010     CYSTOSCOPY, BIOPSY BLADDER, COMBINED N/A 2/19/2018    Procedure: COMBINED CYSTOSCOPY, BIOPSY BLADDER;  Cystoscopy, Bladder Biopsy;  Surgeon: Kenna La MD;  Location: [facility] OR     ENDOSCOPIC ULTRASOUND LOWER GASTROINTESTIONAL TRACT (GI)  N/A 10/30/2015    Procedure: ENDOSCOPIC ULTRASOUND LOWER GASTROINTESTIONAL TRACT (GI);  Surgeon: Daniel Jean-Baptiste MD;  Location: [facility] OR     LAPAROSCOPIC CHOLECYSTECTOMY WITH CHOLANGIOGRAMS N/A 11/1/2015    Procedure: LAPAROSCOPIC CHOLECYSTECTOMY WITH CHOLANGIOGRAMS;  Surgeon: Tonie Warren MD;  Location: [facility] OR     SURGICAL HISTORY OF -   1996    malignant melanoma     SURGICAL HISTORY OF -   1968    thyroid nodule     SURGICAL HISTORY OF -       D & C       Hx of Blood transfusions/reactions: no     Hx of abnormal bleeding or anti-platelet use: on ASA    Menstrual history: No LMP recorded. Patient is postmenopausal.    Steroid use in the last year: no    Personal or FH with difficulty with Anesthesia:  no    Prior to Admission Medications  Current Outpatient Prescriptions   Medication Sig Dispense Refill     Melatonin 10 MG TABS tablet Take 10 mg by mouth nightly as needed for sleep       RaNITidine HCl (ZANTAC PO) Take by mouth twice a week 2 times weekly and BID       Calcium Citrate-Vitamin D (CALCIUM + D PO) Take by mouth 2 times daily       Psyllium (METAMUCIL FIBER PO) Take by mouth 3 times daily       traZODone (DESYREL) 50 MG tablet Take 1 tablet (50 mg) by mouth nightly as needed for sleep (Patient taking differently: Take 25 mg by mouth At Bedtime ) 30 tablet 1     sertraline (ZOLOFT) 100 MG tablet Take 1 tablet (100 mg) by mouth daily (Patient taking differently: Take 100 mg by mouth every morning ) 90 tablet 1     irbesartan (AVAPRO) 300 MG tablet TAKE 1 TABLET EVERY DAY (Patient taking differently: Take 300 mg by mouth every morning TAKE 1 TABLET EVERY DAY) 90 tablet 2     propranolol (INDERAL LA) 60 MG 24 hr capsule Take 1 capsule (60 mg) by mouth daily (Patient taking differently: Take 60 mg by mouth At Bedtime ) 90 capsule 2     methimazole (TAPAZOLE) 5 MG tablet Take 1 tablet (5 mg) by mouth daily (Patient taking differently: Take 5 mg by mouth every morning ) 90 tablet 1     ASPIRIN  "PO Take 81 mg by mouth At Bedtime       cycloSPORINE (RESTASIS) 0.05 % ophthalmic emulsion Place 1 drop into both eyes 2 times daily       polyethylene glycol 0.4%- propylene glycol 0.3% (SYSTANE) 0.4-0.3 % SOLN ophthalmic solution Place 1 drop into both eyes 3 times daily as needed for dry eyes       CRANBERRY CONCENTRATE PO Take 2 capsules by mouth At Bedtime        lovastatin (MEVACOR) 40 MG tablet TAKE 1 TABLET AT BEDTIME (HYPERLIPIDEMIA LDL GOAL  BELOW 130) 90 tablet 2     omeprazole (PRILOSEC) 20 MG CR capsule Take 1 capsule (20 mg) by mouth daily (Patient taking differently: Take 20 mg by mouth five times a week ) 180 capsule 3     alendronate (FOSAMAX) 70 MG tablet TAKE 1 TABLET EVERY 7 DAYS AT LEAST 60 MIN BEFORE BREAKFAST AS DIRECTED \"SEE PACKAGE FOR ADDITIONAL INSTRUCTIONS\" 12 tablet 2     acetaminophen 650 MG TABS Take 650 mg by mouth every 8 hours as needed for mild pain or fever 100 tablet 0     Omega-3 Fatty Acids (FISH OIL) 500 MG CAPS Take 1 capsule by mouth 2 times daily        diazepam (VALIUM) 2 MG tablet Take 1 tablet (2 mg) by mouth every 12 hours as needed for anxiety or sleep 20 tablet 0     nitroGLYcerin (NITROSTAT) 0.4 MG sublingual tablet For chest pain place 1 tablet under the tongue every 5 minutes for 3 doses. If symptoms persist 5 minutes after 1st dose call 929. 39 tablet 3       Allergies  Allergies   Allergen Reactions     No Known Drug Allergies        Social History  Social History     Social History     Marital status:      Spouse name:      Number of children: 2     Occupational History      Retired     Social History Main Topics     Smoking status: Never Smoker     Smokeless tobacco: Never Used     Alcohol use No      Comment: 6 times per year-one drink     Drug use: No     Sexual activity: Yes     Partners: Male     Other Topics Concern      Service No     Blood Transfusions No     Exercise Yes     curves     Seat Belt Yes     Self-Exams Yes     " "Parent/Sibling W/ Cabg, Mi Or Angioplasty Before 65f 55m? No     Living with son and daughter-in-law    Family History  Family History   Problem Relation Age of Onset     CANCER Father      dec - esophageal and laryngeal     HEART DISEASE Mother      Respiratory Mother      dec         ROS/MED HX    ENT/Pulmonary:     (+)JESS risk factors hypertension, age   Neurologic:     (+)other neuro tremors; RLS   Cardiovascular:     (+) Dyslipidemia, hypertension----. Taking blood thinners : .   CP episode 12/13/17 with elevated troponin. 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 Angiogram no CAD. F/U ECHO Chaitanya EF 65%.   AF with RVR in setting of hyperthyroidism. Spontaneous conversion. Managed with B-blocker, ACEI, statin and ASA.   On Tapaxole.  last endo visit 1/2018. Xarelto DC'd 2/2 bleeding.   CHF Last EF: 65%           METS/Exercise Tolerance:  1 - Eating, dressing   Hematologic:  - hematuria with clots. Normal HGB 2 weeks ago     Musculoskeletal:  - generalized weakness in setting hyperthyroid dx     GI/Hepatic:     (+) GERD Asymptomatic on medication, Other GI/Hepatic chronic diarrhea      Renal/Genitourinary:     (+) Nephrolithiasis , Other Renal/ Genitourinary, Hematuria      Endo:     (+) thyroid problem  hyperthyroidism, on tapazole. C/O weakness, UE tremors and diarreha   Denies palpitations or HERMAN. Not very actve due to fatigue-using walker.   Obesity     Psychiatric:     (+) psychiatric history anxiety      Infectious Disease:  - neg infectious disease ROS       Malignancy:   (+) Malignancy History of Skin  Skin CA status post Surgery-melanoma with resection,         Other:    (+) No chance of pregnancy no H/O Chronic Pain,other significant disability Other (comment)         The complete review of systems is negative other than noted in the HPI or here.   Temp: 97.6  F (36.4  C) Temp src: Oral BP: 116/66 Pulse: 62   Resp: 18 SpO2: 97 %         148 lbs 3.2 oz  4' 9\"   Body mass index is 32.07 kg/(m^2).       Physical " Exam  Constitutional: Awake, alert, cooperative, no apparent distress, and appears stated age.  Eyes: Pupils equal, round and reactive to light, no exophthalmus, extra ocular muscles intact, sclera clear, conjunctiva normal.  HENT: Normocephalic, oral pharynx with moist mucus membranes, good dentition. No goiter appreciated.   Respiratory: Clear to auscultation bilaterally, no crackles or wheezing.  Cardiovascular: Regular rate and regularly irregular rhythm, normal S1 and S2, and 2/6 systolic murmur noted.  Carotids +2, no bruits. No LE edema. Palpable pulses to radial  arteries.   GI: Normal bowel sounds, soft, non-distended, non-tender, no masses palpated, no hepatosplenomegaly.    Lymph/Hematologic: No cervical lymphadenopathy and no supraclavicular lymphadenopathy.  Genitourinary:  deferred  Skin: Warm and dry.  No rashes at anticipated surgical site.   Musculoskeletal: Full ROM of neck. There is no redness, warmth, or swelling of the joints. Gross motor strength is not tested  Neurologic: Awake, alert, oriented to name, place and time. Cranial nerves II-XII are grossly intact. Gait is assisted by walker   Neuropsychiatric: Anxious, cooperative. Normal affect.     Labs: (personally reviewed)  CBC RESULTS:   Recent Labs   Lab Test  03/15/18   0753   WBC  7.7   RBC  4.27   HGB  13.2   HCT  39.9   MCV  93   MCH  30.9   MCHC  33.1   RDW  15.0   PLT  334     Last Basic Metabolic Panel:  Lab Results   Component Value Date     03/15/2018      Lab Results   Component Value Date    POTASSIUM 4.9 03/15/2018     Lab Results   Component Value Date    CHLORIDE 104 03/15/2018     Lab Results   Component Value Date    SHREYA 9.2 03/15/2018     Lab Results   Component Value Date    CO2 24 03/15/2018     Lab Results   Component Value Date    BUN 12 03/15/2018     Lab Results   Component Value Date    CR 1.00 03/15/2018     Lab Results   Component Value Date     03/15/2018       EKG: Personally reviewed : formal read  was  Sinus bradycardia with Premature atrial complexes Left axis deviation Voltage criteria for left ventricular hypertrophy    Cardiac echo:   ECHO 3/2018 Global and regional left ventricular function is normal with an EF of 60-65%.  No regional wall motion abnormalities are seen.  Right ventricular function, chamber size, wall motion, and thickness are  normal.  No pericardial effusion is present.  ECHO 12/2017: with EF 35% was more detailed with valve view:   The mitral valve is normal. Trace mitral insufficiency is present.  Moderate aortic valve calcification. Mild aortic insufficiency is present.  Mild aortic stenosis is present.  The tricuspid valve is normal. Trace tricuspid insufficiency is present.     ASSESSMENT and PLAN  Dimple Oviedo is a 84 year old female scheduled to undergo cystoscopy, blue light, bladder biopsies, pyelogram and ureteroscopy, with Dr. Stuart King on 4/3/18, in diagnosis and treatment of urothelial cancer.     Pre-operative considerations incluse  1. Cardiac: Functional status independent. Exercise tolerance < 4 METS. Cards hx as above.  ED visit 3/14-3/15 for CP- 24 hour observation. Negative d-dimer, troponins, chest Xray. TSH 1.94. bnp 757. ECHO  EF normal, No RWMA.    EKG sinus bradycardia with marked sinus arrhythmia.   RCRI = 0.9% risk of major adverse cardiac event  No further cards evaluation indicated. ON ASA which she will hold x 5 days      2.) Endo: Hyperthyroidism diagnosed December-followed here by endocrinology on tapazole-last TSH 2 weeks ago @ 1.9. Pt c/o weakness, diarrhea and UE tremors.   TSH    3.) Heme: Hematuria with clots x several months. HGB 3/15 @ 13.    4.) GI : GERD on PPI and zantac with good control. Chronic diarrhea with negative stool cultures. C. Diff negative.   May take these meds DOS    5.)  Renal: Hx stones. UTI 3/15 completed bactrim course. Normal creatinine  ua with reflex to culture.     She has the following specific operative  considerations:   - RCRI  0.9%  risk of major adverse cardiac event.   - Anesthesia considerations:  Refer to PAC assessment in anesthesia records  - VTE risk:  is elevated due to age and cancer  - JESS # of risks 2/8 = low  - If afib, [id number] score 5.  Risk category moderate to high risk of stroke. Pt not anticoagulated due to bleeding history.    - Risk of PONV score = 2.  If > 2, anti-emetic intervention recommended.      Patient was discussed with Dr Anali Cruz. Patient is optimized and is acceptable candidate for the proposed procedure.  No further diagnostic evaluation is needed.     ALYSSA Obrien  Preoperative Assessment Center  [facility]  Phone: [phone]  Fax: [phone]

## 2018-03-29 NOTE — ANESTHESIA PREPROCEDURE EVALUATION
Anesthesia Evaluation     . Pt has had prior anesthetic. Type: General and MAC    History of anesthetic complications   - PONV        ROS/MED HX    ENT/Pulmonary:     (+)JESS risk factors hypertension, , . .    Neurologic:     (+)other neuro tremors     Cardiovascular:     (+) Dyslipidemia, hypertension----. Taking blood thinners : . CHF Last EF: 65% . . :. . Previous cardiac testing Echodate:results:date: results:ECG reviewed date: results:Cath date: results:          METS/Exercise Tolerance:  1 - Eating, dressing   Hematologic:  - neg hematologic  ROS       Musculoskeletal:  - neg musculoskeletal ROS       GI/Hepatic:     (+) GERD Asymptomatic on medication, Other GI/Hepatic chronic diarrhea      Renal/Genitourinary:     (+) Nephrolithiasis , Other Renal/ Genitourinary, Hematuria      Endo:     (+) thyroid problem  hyperthyroidism, Obesity, .      Psychiatric:     (+) psychiatric history anxiety      Infectious Disease:  - neg infectious disease ROS       Malignancy:   (+) Malignancy History of Skin  Skin CA status post Surgery,         Other:    (+) No chance of pregnancy no H/O Chronic Pain,other significant disability Other (comment)                   Physical Exam  Normal systems: dental    Airway   Mallampati: IV  TM distance: >3 FB  Neck ROM: full    Dental     Cardiovascular   Rhythm and rate: irregular and normal  (+) murmur     PE comment: 3/2018 ECHO  Global and regional left ventricular function is normal with an EF of 60-65%.  No regional wall motion abnormalities are seen.  Right ventricular function, chamber size, wall motion, and thickness are  normal.  No pericardial effusion is present.      Pulmonary    breath sounds clear to auscultation    Other findings: LABS:  CBC RESULTS: Lab Test        03/15/18                       0753          WBC          7.7           RBC          4.27          HGB          13.2          HCT          39.9          MCV          93            MCH          30.9           MCHC         33.1          RDW          15.0          PLT          334           Last Basic Metabolic Panel:  Lab Results      Component                Value               Date                      NA                       138                 03/15/2018             Lab Results      Component                Value               Date                      POTASSIUM                4.9                 03/15/2018            Lab Results      Component                Value               Date                      CHLORIDE                 104                 03/15/2018            Lab Results      Component                Value               Date                      SHREYA                      9.2                 03/15/2018            Lab Results      Component                Value               Date                      CO2                      24                  03/15/2018            Lab Results      Component                Value               Date                      BUN                      12                  03/15/2018            Lab Results      Component                Value               Date                      CR                       1.00                03/15/2018            Lab Results      Component                Value               Date                      GLC                      125                 03/15/2018                     PAC Discussion and Assessment    ASA Classification: 3  Case is suitable for: Tow  Anesthetic techniques and relevant risks discussed: GA  Invasive monitoring and risk discussed:   Types:   Possibility and Risk of blood transfusion discussed:   NPO instructions given:   Additional anesthetic preparation and risks discussed:   Needs early admission to pre-op area:   Other:     PAC Resident/NP Anesthesia Assessment:  84 year old female for cystoscopy, blue light, bladder biopsies, pyelogram and ureteroscopy, with Dr. Stuart King on 4/3/18, in diagnosis and treatment of  urothelial cancer. PC referral for risk assessment and optimization for anesthesia with comorbid conditions of HTN, HLD, cardiac event associated with AF and stress induced cardiomyopathy, anemia, RLS, hyperthyroid, GERDS, PMR, chronic diarrhea.  Pre-operative considerations incluse  1. Cardiac: Functional status independent. Exercise tolerance < 4 METS. CP episode 12/13/17 with elevated troponin. 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 Angiogram no CAD. F/U ECHO Chaitanya EF 65%.   AF with RVR in setting of hyperthyroidism. Spontaneous conversion. Managed with B-blocker, ACEI, statin and ASA. On Tapaxole.  last endo visit 1/2018. Xarelto DC'd 2/2 bleeding.   ED visit 3/14-3/15 for CP- 24 hour observation. Negative d-dimer, troponins, chest Xray. TSH 1.94. bnp 757. ECHO  EF normal, No RWMA.  EKG sinus bradycardia with marked sinus arrhythmia. Cards f/u reflects stability and decrease in diuretic dose.   RCRI = 0.9% risk of major adverse cardiac event  No further cards evaluation indicated. ON ASA which she will hold x 5 days  2.) Pulmonary: Never smoker.   JESS risk -age = 1/8-low risk  3.) Endo: Hyperthyroidism diagnosed December-followed here by endocrinology on tapazole-last TSH 2 weeks ago @ 1.9. Pt c/o weakness, diarrhea and UE tremors. Echo f/u in May  4.) Heme: Hematuria with clots x several months. HGB 3/15 @ 13.  VTE risk is elevated due to age and cancer  5.) GI : GERD on PPI and zantac with good control. Chronic diarrhea with negative stool cultures. C. Diff negative.   PONV score = 2 ( 2 or > antiemetic prophylaxis recommended)  6.)  Renal: Hx stones. UTI 3/15 completed bactrim course. Normal creatinine  ua with reflex to culture  Anesthesia: 2/18/18/LMA, no problems. 11/2015 GA ETT easy intubation -no complications/  Pt also seen by Dr. Anali Cruz. Please see his recommendations below.              Reviewed and Signed by PAC Mid-Level Provider/Resident  Mid-Level Provider/Resident: Kelley Medrano  ROSANNE  Date: 3/29/18  Time: 11:41 AM    Attending Anesthesiologist Anesthesia Assessment:  Ms Oviedo is a 84 year old for the above procedure with Dr. King.   Extensive cardiac history with workup, EF back to 65%  Off anticoagulation  Post operative nausea with last procedure  Weakness and tremor today, will check TSH and free T4  GERD, consider RSI if symptomatic day of surgery.   I saw the patient and discussed with the DARON as above.  Final anesthetic plan and recommendations to be made by the attending anesthesiologist on the day of surgery.     Anali Cruz MD CA-1        Reviewed and Signed by PAC Anesthesiologist  Anesthesiologist: thai  Date: 3.29.2018  Time:   Pass/Fail: Pass  Disposition:     PAC Pharmacist Assessment:        Pharmacist:   Date:   Time:      Anesthesia Plan      History & Physical Review  History and physical reviewed and following examination; no interval change.    ASA Status:  3 .    NPO Status:  > 8 hours    Plan for General and ETT with Intravenous induction. Maintenance will be Balanced.    PONV prophylaxis:  Ondansetron (or other 5HT-3) and Dexamethasone or Solumedrol  Additional equipment: Videolaryngoscope      Postoperative Care      Consents  Anesthetic plan, risks, benefits and alternatives discussed with:  Patient.  Use of blood products discussed: Yes.   Use of blood products discussed with Patient.  Consented to blood products.  .      Nain Mcknight MD  Anesthesiologist  10:35 AM  April 3, 2018                        .

## 2018-03-29 NOTE — MR AVS SNAPSHOT
After Visit Summary   3/29/2018    Dimple Oviedo    MRN: [id number]           Patient Information     Date Of Birth          1933        Visit Information        Provider Department      3/29/2018 11:30 AM Rn, [facility]        Care Instructions    Preparing for Your Surgery      Name:  Dimple Oviedo   MRN:  [id number]   :  1933   Today's Date:  3/29/2018     Arriving for surgery:  Surgery date:  18  Arrival time 0930 AM  Please come to:       [facility] [address]  [address] [place]  [address]    -  [employer] parking is available in front of the hospital from 5:15 am to 8:00 pm    -  Stop at the Information Desk in the lobby    -   Inform the information person that you are here for surgery. An escort to 3c will be provided. If you would not like an escort, please proceed to 3C on the 3rd floor. [phone]     What can I eat or drink?  -  You may have solid food or milk products until 8 hours prior to your surgery. 12  caity.  -  You may have water, apple juice or 7up/Sprite until 2 hours prior to your surgery. 0930 AM Tuesday caity.    Which medicines can I take?  -  Do NOT take these medications in the morning, the day of surgery:  HOLD ASPIRIN AS OF 18     , HOLD FISH OILD AS OF ,     HOLD CALCIUM/VIT D , IBERSARTAN , METAMUCIL AM OF SURG.    -  Please take these medications the day of surgery:  SCHEDULED MEDS    How do I prepare myself?  -  Take two showers: one the night before surgery; and one the morning of surgery.         Use Scrubcare or Hibiclens to wash from neck down.  You may use your own shampoo and conditioner. No other hair products.   -  Do NOT use lotion, powder, deodorant, or antiperspirant the day of your surgery.  -  Do NOT wear any makeup, fingernail polish or jewelry.  -Do not bring your own medications to the hospital, except for inhalers and eye drops.  -   Bring your ID and insurance card.    Questions or Concerns:  If you have questions or concerns regarding the day of surgery, please call the Preoperative Assessment Center (PAC), Monday-Friday 7AM-7PM:  [phone].  After surgery please call your surgeons office. AFTER YOUR SURGERY  Breathing exercises   Breathing exercises help you recover faster. Take deep breaths and let the air out slowly. This will:     Help you wake up after surgery.    Help prevent complications like pneumonia.  Preventing complications will help you go home sooner. .   Nausea and vomiting   You may feel sick to your stomach after surgery; if so, let your nurse know.    Pain control:  After surgery, you may have pain. Our goal is to help you manage your pain. Pain medicine will help you feel comfortable enough to do activities that will help you heal.  These activities may include breathing exercises, walking and physical therapy.   To help your health care team treat your pain we will ask: 1) If you have pain  2) where it is located 3) describe your pain in your words  Methods of pain control include medications given by mouth, vein or by nerve block for some surgeries.  We may give you a pain control pump that will:  1) Deliver the medicine through a tube placed in your vein  2) Control the amount of medicine you receive  3) Allow you to push a button to deliver a dose of pain medicine  Sequential Compression Device (SCD) or Pneumo Boots:  You may need to wear SCD S on your legs or feet. These are wraps connected to a machine that pumps in air and releases it. The repeated pumping helps prevent blood clots from forming.                         Follow-ups after your visit        Your next 10 appointments already scheduled     Mar 29, 2018 12:15 PM CDT   LAB with [facility] ([facility] [place])    [address] [place] [address] [phone]           Please do not eat 10-12 hours  before your appointment if you are coming in fasting for labs on lipids, cholesterol, or glucose (sugar). This does not apply to pregnant women. Water, hot tea and black coffee (with nothing added) are okay. Do not drink other fluids, diet soda or chew gum.            Apr 03, 2018   Procedure with Stuart iKng MD   [facility] [place], Same Day Surgery (--)    [address] [facility] [address]   [phone]            Apr 19, 2018  9:40 AM CDT   (Arrive by 9:25 AM)   Post-Op with Stuart King MD   [facility] ([facility] [place])    [address]  4th Floor  [facility] [address]   [phone]            Apr 26, 2018 10:30 AM CDT   (Arrive by 10:15 AM)   Cystoscopy with Kenna La MD   [facility] ([facility])    [address]  4th Floor  [facility] [address]   [phone]            May 21, 2018 12:30 PM CDT   Lab with  LAB   [facility] Lab ([facility])    [address]  1st Floor  [facility] [address]   [phone]            May 21, 2018  1:00 PM CDT   (Arrive by 12:45 PM)   CORE RETURN with NELSON Smart CNP   [facility] ([facility])    [address]  [address]  [facility] [address]   [phone]            May 29, 2018 10:00 AM CDT   (Arrive by 9:45 AM)   RETURN ENDOCRINE with Dhara Meza MD   [facility] Endocrinology ([facility])    [address]  3rd Floor  [facility] [address]   [phone]            Jul 02, 2018 10:00 AM CDT   (Arrive by 9:45 AM)   New Patient Visit with Vinny Duran MD   [facility] ([facility])    [address] [facility] [address]  4th Floor  [facility] [address]   [phone]            Jul 05, 2018  1:30 PM  CDT   (Arrive by 1:15 PM)   NEW ARRHYTHMIA with Butch Griffith MD   [facility] ([facility] [place])    [address] [facility]  [address] [place] [address] [phone]              Future tests that were ordered for you today     Open Future Orders        Priority Expected Expires Ordered    UA reflex to Microscopic and Culture Routine 3/29/2018 4/28/2018 3/29/2018            Who to contact     Please call your clinic at [phone] to:    Ask questions about your health    Make or cancel appointments    Discuss your medicines    Learn about your test results    Speak to your doctor            Additional Information About Your Visit        [employer] Information     [employer] gives you secure access to your electronic health record. If you see a primary care provider, you can also send messages to your care team and make appointments. If you have questions, please call your primary care clinic.  If you do not have a primary care provider, please call [phone] and they will assist you.      [employer] is an electronic gateway that provides easy, online access to your medical records. With [employer], you can request a clinic appointment, read your test results, renew a prescription or communicate with your care team.     To access your existing account, please contact your [facility] or call [phone] for assistance.        Care EveryWhere ID     This is your Care EveryWhere ID. This could be used by other organizations to access your [facility] medical records  [id number]         Blood Pressure from Last 3 Encounters:   03/29/18 116/66   03/19/18 113/47   03/15/18 171/87    Weight from Last 3 Encounters:   03/29/18 67.2 kg (148 lb 3.2 oz)   03/22/18 67.6 kg (149 lb)   03/19/18 67.1 kg (148 lb)              Today, you had the following     No orders found for display         Today's Medication Changes          These changes are accurate as of 3/29/18  12:00 PM.  If you have any questions, ask your nurse or doctor.               These medicines have changed or have updated prescriptions.        Dose/Directions    irbesartan 300 MG tablet   Commonly known as:  AVAPRO   This may have changed:    - how much to take  - how to take this  - when to take this  - additional instructions   Used for:  Essential hypertension with goal blood pressure less than 140/90        TAKE 1 TABLET EVERY DAY   Quantity:  90 tablet   Refills:  2       methimazole 5 MG tablet   Commonly known as:  TAPAZOLE   This may have changed:  when to take this   Used for:  Nontoxic multinodular goiter        Dose:  5 mg   Take 1 tablet (5 mg) by mouth daily   Quantity:  90 tablet   Refills:  1       omeprazole 20 MG CR capsule   Commonly known as:  priLOSEC   This may have changed:  when to take this   Used for:  Gastroesophageal reflux disease without esophagitis        Dose:  20 mg   Take 1 capsule (20 mg) by mouth daily   Quantity:  180 capsule   Refills:  3       propranolol 60 MG 24 hr capsule   Commonly known as:  INDERAL LA   This may have changed:  when to take this   Used for:  Nontoxic multinodular goiter        Dose:  60 mg   Take 1 capsule (60 mg) by mouth daily   Quantity:  90 capsule   Refills:  2       sertraline 100 MG tablet   Commonly known as:  ZOLOFT   This may have changed:  when to take this   Used for:  THERON (generalized anxiety disorder)        Dose:  100 mg   Take 1 tablet (100 mg) by mouth daily   Quantity:  90 tablet   Refills:  1       traZODone 50 MG tablet   Commonly known as:  DESYREL   This may have changed:    - how much to take  - when to take this   Used for:  Insomnia, unspecified type        Dose:  50 mg   Take 1 tablet (50 mg) by mouth nightly as needed for sleep   Quantity:  30 tablet   Refills:  1                Primary Care Provider Office Phone # Fax #    Pop Zapien [phone] [address] [place] [address]       "  Equal Access to Services     [facility] Center[facility]ISE[facility] : Hadii shameka chacon bryce Pollack, waaxda luqadaha, qaybta kaalmacristian chávez, maldonado bey. So [facility] [phone].    ATENCIÓN: Si habla español, tiene a sierra disposición servicios gratuitos de asistencia lingüística. Ryaname al [phone].    We comply with applicable federal civil rights laws and [place] laws. We do not discriminate on the basis of race, color, national origin, age, disability, sex, sexual orientation, or gender identity.            Thank you!     Thank you for choosing [facility]  for your care. Our goal is always to provide you with excellent care. Hearing back from our patients is one way we can continue to improve our services. Please take a few minutes to complete the written survey that you may receive in the mail after your visit with us. Thank you!             Your Updated Medication List - Protect others around you: Learn how to safely use, store and throw away your medicines at www.disposemymeds.org.          This list is accurate as of 3/29/18 12:00 PM.  Always use your most recent med list.                   Brand Name Dispense Instructions for use Diagnosis    acetaminophen 650 MG 8 hour tablet     100 tablet    Take 650 mg by mouth every 8 hours as needed for mild pain or fever    Ascending cholangitis       alendronate 70 MG tablet    FOSAMAX    12 tablet    TAKE 1 TABLET EVERY 7 DAYS AT LEAST 60 MIN BEFORE BREAKFAST AS DIRECTED \"SEE PACKAGE FOR ADDITIONAL INSTRUCTIONS\"    High risk medication use, Osteopenia       ASPIRIN PO      Take 81 mg by mouth At Bedtime        CALCIUM + D PO      Take by mouth 2 times daily        CRANBERRY CONCENTRATE PO      Take 2 capsules by mouth At Bedtime        cycloSPORINE 0.05 % ophthalmic emulsion    RESTASIS     Place 1 drop into both eyes 2 times daily        diazepam 2 MG tablet    VALIUM    20 tablet    Take 1 tablet (2 mg) by mouth every 12 " hours as needed for anxiety or sleep    THERON (generalized anxiety disorder)       Fish Oil 500 MG Caps      Take 1 capsule by mouth 2 times daily        irbesartan 300 MG tablet    AVAPRO    90 tablet    TAKE 1 TABLET EVERY DAY    Essential hypertension with goal blood pressure less than 140/90       lovastatin 40 MG tablet    MEVACOR    90 tablet    TAKE 1 TABLET AT BEDTIME (HYPERLIPIDEMIA LDL GOAL  BELOW 130)    Hyperlipidemia LDL goal <130       Melatonin 10 MG Tabs tablet      Take 10 mg by mouth nightly as needed for sleep        METAMUCIL FIBER PO      Take by mouth 3 times daily        methimazole 5 MG tablet    TAPAZOLE    90 tablet    Take 1 tablet (5 mg) by mouth daily    Nontoxic multinodular goiter       nitroGLYcerin 0.4 MG sublingual tablet    NITROSTAT    25 tablet    For chest pain place 1 tablet under the tongue every 5 minutes for 3 doses. If symptoms persist 5 minutes after 1st dose call 911.    Elevated troponin       omeprazole 20 MG CR capsule    priLOSEC    180 capsule    Take 1 capsule (20 mg) by mouth daily    Gastroesophageal reflux disease without esophagitis       propranolol 60 MG 24 hr capsule    INDERAL LA    90 capsule    Take 1 capsule (60 mg) by mouth daily    Nontoxic multinodular goiter       sertraline 100 MG tablet    ZOLOFT    90 tablet    Take 1 tablet (100 mg) by mouth daily    THERON (generalized anxiety disorder)       SYSTANE 0.4-0.3 % Soln ophthalmic solution   Generic drug:  polyethylene glycol 0.4%- propylene glycol 0.3%      Place 1 drop into both eyes 3 times daily as needed for dry eyes        traZODone 50 MG tablet    DESYREL    30 tablet    Take 1 tablet (50 mg) by mouth nightly as needed for sleep    Insomnia, unspecified type       ZANTAC PO      Take by mouth twice a week 2 times weekly and BID

## 2018-03-29 NOTE — PATIENT INSTRUCTIONS
Preparing for Your Surgery      Name:  Dimple Oviedo   MRN:  [id number]   :  1933   Today's Date:  3/29/2018     Arriving for surgery:  Surgery date:  18  Arrival time 0930 AM  Please come to:       [facility] [address]  [address] [place]  [address]    -   parking is available in front of the hospital from 5:15 am to 8:00 pm    -  Stop at the Information Desk in the lobby    -   Inform the information person that you are here for surgery. An escort to 3c will be provided. If you would not like an escort, please proceed to 3C on the 3rd floor. [phone]     What can I eat or drink?  -  You may have solid food or milk products until 8 hours prior to your surgery. 12 [place] Monday caity.  -  You may have water, apple juice or 7up/Sprite until 2 hours prior to your surgery. 0930 AM Tuesday caity.    Which medicines can I take?  -  Do NOT take these medications in the morning, the day of surgery:  HOLD ASPIRIN AS OF 18     , HOLD FISH OILD AS OF ,     HOLD CALCIUM/VIT D , IBERSARTAN , METAMUCIL AM OF SURG.    -  Please take these medications the day of surgery:  SCHEDULED MEDS    How do I prepare myself?  -  Take two showers: one the night before surgery; and one the morning of surgery.         Use Scrubcare or Hibiclens to wash from neck down.  You may use your own shampoo and conditioner. No other hair products.   -  Do NOT use lotion, powder, deodorant, or antiperspirant the day of your surgery.  -  Do NOT wear any makeup, fingernail polish or jewelry.  -Do not bring your own medications to the hospital, except for inhalers and eye drops.  -  Bring your ID and insurance card.    Questions or Concerns:  If you have questions or concerns regarding the day of surgery, please call the Preoperative Assessment Center (PAC), Monday-Friday 7AM-7PM:  [phone].  After surgery please call your surgeons office. AFTER YOUR SURGERY  Breathing exercises    Breathing exercises help you recover faster. Take deep breaths and let the air out slowly. This will:     Help you wake up after surgery.    Help prevent complications like pneumonia.  Preventing complications will help you go home sooner. .   Nausea and vomiting   You may feel sick to your stomach after surgery; if so, let your nurse know.    Pain control:  After surgery, you may have pain. Our goal is to help you manage your pain. Pain medicine will help you feel comfortable enough to do activities that will help you heal.  These activities may include breathing exercises, walking and physical therapy.   To help your health care team treat your pain we will ask: 1) If you have pain  2) where it is located 3) describe your pain in your words  Methods of pain control include medications given by mouth, vein or by nerve block for some surgeries.  We may give you a pain control pump that will:  1) Deliver the medicine through a tube placed in your vein  2) Control the amount of medicine you receive  3) Allow you to push a button to deliver a dose of pain medicine  Sequential Compression Device (SCD) or Pneumo Boots:  You may need to wear SCD S on your legs or feet. These are wraps connected to a machine that pumps in air and releases it. The repeated pumping helps prevent blood clots from forming.

## 2018-03-30 ENCOUNTER — TELEPHONE (OUTPATIENT)
Dept: FAMILY MEDICINE | Facility: CLINIC | Age: 83
End: 2018-03-30

## 2018-03-30 DIAGNOSIS — N39.0 URINARY TRACT INFECTION WITH HEMATURIA, SITE UNSPECIFIED: ICD-10-CM

## 2018-03-30 DIAGNOSIS — R31.9 URINARY TRACT INFECTION WITH HEMATURIA, SITE UNSPECIFIED: ICD-10-CM

## 2018-03-30 RX ORDER — CIPROFLOXACIN 250 MG/1
250 TABLET, FILM COATED ORAL 2 TIMES DAILY
Qty: 6 TABLET | Refills: 0 | Status: SHIPPED | OUTPATIENT
Start: 2018-03-30 | End: 2018-03-30

## 2018-03-30 RX ORDER — CIPROFLOXACIN 250 MG/1
250 TABLET, FILM COATED ORAL 2 TIMES DAILY
Qty: 6 TABLET | Refills: 0 | Status: ON HOLD | OUTPATIENT
Start: 2018-03-30 | End: 2018-04-03

## 2018-03-30 NOTE — PROGRESS NOTES
Please call Dimple to give her information below, thanks!    Dear Ms. Oviedo:    Your urinary tract infection looks like it might not respond to the antibiotic you're taking. Please start taking a different one called Ciprofloxacin, which I'll send to your pharmacy, as soon as possible. You can stop taking the Bactrim/Septra that was already prescribed. I recommend you get another urine test done BEFORE your cystoscopy next week to make sure that the infection has cleared.  I'll put the order in, you can schedule a lab appointment anytime. Please consider letting your urologist know as well to see if there are any special recommendations, or if the cystoscopy needs to be rescheduled.    Please contact my office with any questions!    Sincerely,  Dr. Mary Denise MD  3/30/2018

## 2018-03-30 NOTE — TELEPHONE ENCOUNTER
Please call Dimple to give her information below, thanks!     Dear Ms. Oviedo:     Your urinary tract infection looks like it might not respond to the antibiotic you're taking. Please start taking a different one called Ciprofloxacin, which I'll send to your pharmacy, as soon as possible. You can stop taking the Bactrim/Septra that was already prescribed. I recommend you get another urine test done BEFORE your cystoscopy next week to make sure that the infection has cleared.  I'll put the order in, you can schedule a lab appointment anytime. Please consider letting your urologist know as well to see if there are any special recommendations, or if the cystoscopy needs to be rescheduled.     Please contact my office with any questions!     Sincerely,   Dr. Mary Denise MD   3/30/2018                  PT wanted antibiotic sent to a different pharmacy.  Done. Told [facility] pharmacy to cancel the rx they rec'd.  Read entire message to both pt and DIL.  They will also call the urologist to inform them.  ATUL Lee

## 2018-03-31 LAB
BACTERIA SPEC CULT: ABNORMAL
Lab: ABNORMAL
SPECIMEN SOURCE: ABNORMAL

## 2018-04-02 ENCOUNTER — TELEPHONE (OUTPATIENT)
Dept: FAMILY MEDICINE | Facility: CLINIC | Age: 83
End: 2018-04-02

## 2018-04-02 DIAGNOSIS — N39.0 URINARY TRACT INFECTION WITH HEMATURIA, SITE UNSPECIFIED: ICD-10-CM

## 2018-04-02 DIAGNOSIS — R31.9 URINARY TRACT INFECTION WITH HEMATURIA, SITE UNSPECIFIED: Primary | ICD-10-CM

## 2018-04-02 DIAGNOSIS — R31.9 URINARY TRACT INFECTION WITH HEMATURIA, SITE UNSPECIFIED: ICD-10-CM

## 2018-04-02 DIAGNOSIS — N39.0 URINARY TRACT INFECTION WITH HEMATURIA, SITE UNSPECIFIED: Primary | ICD-10-CM

## 2018-04-02 LAB
ALBUMIN UR-MCNC: >=300 MG/DL
APPEARANCE UR: ABNORMAL
BACTERIA #/AREA URNS HPF: ABNORMAL /HPF
BILIRUB UR QL STRIP: ABNORMAL
COLOR UR AUTO: ABNORMAL
GLUCOSE UR STRIP-MCNC: NEGATIVE MG/DL
HGB UR QL STRIP: ABNORMAL
KETONES UR STRIP-MCNC: NEGATIVE MG/DL
LEUKOCYTE ESTERASE UR QL STRIP: ABNORMAL
NITRATE UR QL: NEGATIVE
NON-SQ EPI CELLS #/AREA URNS LPF: ABNORMAL /LPF
PH UR STRIP: 6.5 PH (ref 5–7)
RBC #/AREA URNS AUTO: >100 /HPF
SOURCE: ABNORMAL
SP GR UR STRIP: 1.01 (ref 1–1.03)
UROBILINOGEN UR STRIP-ACNC: 1 EU/DL (ref 0.2–1)
WBC #/AREA URNS AUTO: ABNORMAL /HPF

## 2018-04-02 PROCEDURE — 81001 URINALYSIS AUTO W/SCOPE: CPT | Performed by: FAMILY MEDICINE

## 2018-04-02 NOTE — TELEPHONE ENCOUNTER
See 3/30/18 telephone encounter.    UA pended.    Dr. Zapien-Please sign if agree.    Thank you!  SHASHANK MurrellN, RN

## 2018-04-02 NOTE — TELEPHONE ENCOUNTER
Patient called explaining she was in last week for UTI and apparently was told the order would stay open to repeat today    Patient states she needs to come in this afternoon and scheduled at 2pm as she has surgery tomorrow    Sending to DOD as Dr Denise is out     No need to call patient back if ok

## 2018-04-03 ENCOUNTER — ANESTHESIA (OUTPATIENT)
Dept: SURGERY | Facility: CLINIC | Age: 83
End: 2018-04-03
Payer: MEDICARE

## 2018-04-03 ENCOUNTER — HOSPITAL ENCOUNTER (OUTPATIENT)
Facility: CLINIC | Age: 83
Discharge: HOME | End: 2018-04-03
Attending: UROLOGY | Admitting: UROLOGY
Payer: MEDICARE

## 2018-04-03 ENCOUNTER — APPOINTMENT (OUTPATIENT)
Dept: GENERAL RADIOLOGY | Facility: CLINIC | Age: 83
End: 2018-04-03
Attending: UROLOGY
Payer: MEDICARE

## 2018-04-03 VITALS
HEIGHT: 57 IN | BODY MASS INDEX: 31.63 KG/M2 | TEMPERATURE: 97.9 F | OXYGEN SATURATION: 97 % | RESPIRATION RATE: 16 BRPM | SYSTOLIC BLOOD PRESSURE: 130 MMHG | DIASTOLIC BLOOD PRESSURE: 59 MMHG | WEIGHT: 146.61 LBS

## 2018-04-03 DIAGNOSIS — R31.9 URINARY TRACT INFECTION WITH HEMATURIA, SITE UNSPECIFIED: ICD-10-CM

## 2018-04-03 DIAGNOSIS — N39.0 URINARY TRACT INFECTION WITH HEMATURIA, SITE UNSPECIFIED: ICD-10-CM

## 2018-04-03 LAB — GLUCOSE BLDC GLUCOMTR-MCNC: 120 MG/DL (ref 70–99)

## 2018-04-03 PROCEDURE — C9399 UNCLASSIFIED DRUGS OR BIOLOG: HCPCS | Performed by: NURSE ANESTHETIST, CERTIFIED REGISTERED

## 2018-04-03 PROCEDURE — 25000128 H RX IP 250 OP 636: Performed by: NURSE ANESTHETIST, CERTIFIED REGISTERED

## 2018-04-03 PROCEDURE — 71000027 ZZH RECOVERY PHASE 2 EACH 15 MINS: Performed by: UROLOGY

## 2018-04-03 PROCEDURE — 40000170 ZZH STATISTIC PRE-PROCEDURE ASSESSMENT II: Performed by: UROLOGY

## 2018-04-03 PROCEDURE — 71000015 ZZH RECOVERY PHASE 1 LEVEL 2 EA ADDTL HR: Performed by: UROLOGY

## 2018-04-03 PROCEDURE — 27210794 ZZH OR GENERAL SUPPLY STERILE: Performed by: UROLOGY

## 2018-04-03 PROCEDURE — 25000565 ZZH ISOFLURANE, EA 15 MIN: Performed by: UROLOGY

## 2018-04-03 PROCEDURE — 37000008 ZZH ANESTHESIA TECHNICAL FEE, 1ST 30 MIN: Performed by: UROLOGY

## 2018-04-03 PROCEDURE — 40000277 XR SURGERY CARM FLUORO LESS THAN 5 MIN W STILLS: Mod: TC

## 2018-04-03 PROCEDURE — 88305 TISSUE EXAM BY PATHOLOGIST: CPT | Performed by: UROLOGY

## 2018-04-03 PROCEDURE — 25000128 H RX IP 250 OP 636: Performed by: UROLOGY

## 2018-04-03 PROCEDURE — 71000014 ZZH RECOVERY PHASE 1 LEVEL 2 FIRST HR: Performed by: UROLOGY

## 2018-04-03 PROCEDURE — 37000009 ZZH ANESTHESIA TECHNICAL FEE, EACH ADDTL 15 MIN: Performed by: UROLOGY

## 2018-04-03 PROCEDURE — 25000125 ZZHC RX 250: Performed by: NURSE ANESTHETIST, CERTIFIED REGISTERED

## 2018-04-03 PROCEDURE — C9275 HEXAMINOLEVULINATE HCL: HCPCS | Performed by: UROLOGY

## 2018-04-03 PROCEDURE — 82962 GLUCOSE BLOOD TEST: CPT

## 2018-04-03 PROCEDURE — 36000059 ZZH SURGERY LEVEL 3 EA 15 ADDTL MIN UMMC: Performed by: UROLOGY

## 2018-04-03 PROCEDURE — C1769 GUIDE WIRE: HCPCS | Performed by: UROLOGY

## 2018-04-03 PROCEDURE — 25500064 ZZH RX 255 OP 636: Performed by: UROLOGY

## 2018-04-03 PROCEDURE — 88112 CYTOPATH CELL ENHANCE TECH: CPT | Performed by: UROLOGY

## 2018-04-03 PROCEDURE — 36000061 ZZH SURGERY LEVEL 3 W FLUORO 1ST 30 MIN - UMMC: Performed by: UROLOGY

## 2018-04-03 PROCEDURE — 25000128 H RX IP 250 OP 636: Performed by: ANESTHESIOLOGY

## 2018-04-03 PROCEDURE — 40000556 ZZH STATISTIC PERIPHERAL IV START W US GUIDANCE

## 2018-04-03 RX ORDER — CIPROFLOXACIN 2 MG/ML
INJECTION, SOLUTION INTRAVENOUS PRN
Status: DISCONTINUED | OUTPATIENT
Start: 2018-04-03 | End: 2018-04-03

## 2018-04-03 RX ORDER — LIDOCAINE HYDROCHLORIDE 20 MG/ML
INJECTION, SOLUTION INFILTRATION; PERINEURAL PRN
Status: DISCONTINUED | OUTPATIENT
Start: 2018-04-03 | End: 2018-04-03

## 2018-04-03 RX ORDER — CEFAZOLIN SODIUM 1 G/3ML
1 INJECTION, POWDER, FOR SOLUTION INTRAMUSCULAR; INTRAVENOUS SEE ADMIN INSTRUCTIONS
Status: DISCONTINUED | OUTPATIENT
Start: 2018-04-03 | End: 2018-04-03 | Stop reason: ALTCHOICE

## 2018-04-03 RX ORDER — FENTANYL CITRATE 50 UG/ML
INJECTION, SOLUTION INTRAMUSCULAR; INTRAVENOUS PRN
Status: DISCONTINUED | OUTPATIENT
Start: 2018-04-03 | End: 2018-04-03

## 2018-04-03 RX ORDER — CEFAZOLIN SODIUM 2 G/100ML
2 INJECTION, SOLUTION INTRAVENOUS
Status: DISCONTINUED | OUTPATIENT
Start: 2018-04-03 | End: 2018-04-03 | Stop reason: ALTCHOICE

## 2018-04-03 RX ORDER — NALOXONE HYDROCHLORIDE 0.4 MG/ML
.1-.4 INJECTION, SOLUTION INTRAMUSCULAR; INTRAVENOUS; SUBCUTANEOUS
Status: DISCONTINUED | OUTPATIENT
Start: 2018-04-03 | End: 2018-04-03 | Stop reason: HOSPADM

## 2018-04-03 RX ORDER — ONDANSETRON 2 MG/ML
INJECTION INTRAMUSCULAR; INTRAVENOUS PRN
Status: DISCONTINUED | OUTPATIENT
Start: 2018-04-03 | End: 2018-04-03

## 2018-04-03 RX ORDER — LIDOCAINE 40 MG/G
CREAM TOPICAL
Status: DISCONTINUED | OUTPATIENT
Start: 2018-04-03 | End: 2018-04-03 | Stop reason: HOSPADM

## 2018-04-03 RX ORDER — MEPERIDINE HYDROCHLORIDE 50 MG/ML
12.5 INJECTION INTRAMUSCULAR; INTRAVENOUS; SUBCUTANEOUS
Status: DISCONTINUED | OUTPATIENT
Start: 2018-04-03 | End: 2018-04-03 | Stop reason: HOSPADM

## 2018-04-03 RX ORDER — SODIUM CHLORIDE, SODIUM LACTATE, POTASSIUM CHLORIDE, CALCIUM CHLORIDE 600; 310; 30; 20 MG/100ML; MG/100ML; MG/100ML; MG/100ML
INJECTION, SOLUTION INTRAVENOUS CONTINUOUS
Status: DISCONTINUED | OUTPATIENT
Start: 2018-04-03 | End: 2018-04-03 | Stop reason: HOSPADM

## 2018-04-03 RX ORDER — ONDANSETRON 4 MG/1
4 TABLET, ORALLY DISINTEGRATING ORAL EVERY 30 MIN PRN
Status: DISCONTINUED | OUTPATIENT
Start: 2018-04-03 | End: 2018-04-03 | Stop reason: HOSPADM

## 2018-04-03 RX ORDER — HYDROMORPHONE HYDROCHLORIDE 1 MG/ML
.3-.5 INJECTION, SOLUTION INTRAMUSCULAR; INTRAVENOUS; SUBCUTANEOUS EVERY 10 MIN PRN
Status: DISCONTINUED | OUTPATIENT
Start: 2018-04-03 | End: 2018-04-03 | Stop reason: HOSPADM

## 2018-04-03 RX ORDER — CIPROFLOXACIN 250 MG/1
250 TABLET, FILM COATED ORAL 2 TIMES DAILY
Qty: 6 TABLET | Refills: 0 | Status: SHIPPED | OUTPATIENT
Start: 2018-04-03 | End: 2019-01-30

## 2018-04-03 RX ORDER — FENTANYL CITRATE 50 UG/ML
25-50 INJECTION, SOLUTION INTRAMUSCULAR; INTRAVENOUS
Status: DISCONTINUED | OUTPATIENT
Start: 2018-04-03 | End: 2018-04-03 | Stop reason: HOSPADM

## 2018-04-03 RX ORDER — ONDANSETRON 2 MG/ML
4 INJECTION INTRAMUSCULAR; INTRAVENOUS EVERY 30 MIN PRN
Status: DISCONTINUED | OUTPATIENT
Start: 2018-04-03 | End: 2018-04-03 | Stop reason: HOSPADM

## 2018-04-03 RX ORDER — EPHEDRINE SULFATE 50 MG/ML
INJECTION, SOLUTION INTRAMUSCULAR; INTRAVENOUS; SUBCUTANEOUS PRN
Status: DISCONTINUED | OUTPATIENT
Start: 2018-04-03 | End: 2018-04-03

## 2018-04-03 RX ORDER — PROPOFOL 10 MG/ML
INJECTION, EMULSION INTRAVENOUS PRN
Status: DISCONTINUED | OUTPATIENT
Start: 2018-04-03 | End: 2018-04-03

## 2018-04-03 RX ORDER — SODIUM CHLORIDE 9 MG/ML
INJECTION, SOLUTION INTRAVENOUS CONTINUOUS PRN
Status: DISCONTINUED | OUTPATIENT
Start: 2018-04-03 | End: 2018-04-03

## 2018-04-03 RX ADMIN — PROPOFOL 130 MG: 10 INJECTION, EMULSION INTRAVENOUS at 12:06

## 2018-04-03 RX ADMIN — Medication 5 MG: at 12:40

## 2018-04-03 RX ADMIN — SUGAMMADEX 130 MG: 100 INJECTION, SOLUTION INTRAVENOUS at 12:56

## 2018-04-03 RX ADMIN — FENTANYL CITRATE 50 MCG: 50 INJECTION, SOLUTION INTRAMUSCULAR; INTRAVENOUS at 12:56

## 2018-04-03 RX ADMIN — ONDANSETRON 4 MG: 2 INJECTION INTRAMUSCULAR; INTRAVENOUS at 12:59

## 2018-04-03 RX ADMIN — Medication 5 MG: at 12:22

## 2018-04-03 RX ADMIN — PROPOFOL 30 MG: 10 INJECTION, EMULSION INTRAVENOUS at 13:00

## 2018-04-03 RX ADMIN — Medication 5 MG: at 12:17

## 2018-04-03 RX ADMIN — SODIUM CHLORIDE: 9 INJECTION, SOLUTION INTRAVENOUS at 11:46

## 2018-04-03 RX ADMIN — ONDANSETRON 4 MG: 2 INJECTION INTRAMUSCULAR; INTRAVENOUS at 13:56

## 2018-04-03 RX ADMIN — LIDOCAINE HYDROCHLORIDE 70 MG: 20 INJECTION, SOLUTION INFILTRATION; PERINEURAL at 12:06

## 2018-04-03 RX ADMIN — FENTANYL CITRATE 25 MCG: 50 INJECTION, SOLUTION INTRAMUSCULAR; INTRAVENOUS at 13:46

## 2018-04-03 RX ADMIN — ROCURONIUM BROMIDE 50 MG: 10 INJECTION INTRAVENOUS at 12:06

## 2018-04-03 RX ADMIN — FENTANYL CITRATE 50 MCG: 50 INJECTION, SOLUTION INTRAMUSCULAR; INTRAVENOUS at 11:46

## 2018-04-03 RX ADMIN — CIPROFLOXACIN 400 MG: 2 INJECTION INTRAVENOUS at 12:19

## 2018-04-03 RX ADMIN — FENTANYL CITRATE 25 MCG: 50 INJECTION, SOLUTION INTRAMUSCULAR; INTRAVENOUS at 14:05

## 2018-04-03 RX ADMIN — FENTANYL CITRATE 50 MCG: 50 INJECTION, SOLUTION INTRAMUSCULAR; INTRAVENOUS at 11:51

## 2018-04-03 RX ADMIN — HEXAMINOLEVULINATE HYDROCHLORIDE 100 MG: KIT at 11:48

## 2018-04-03 NOTE — OP NOTE
OPERATIVE NOTE    DATE OF SURGERY: 4/3/2018    PREOPERATIVE DIAGNOSIS:  Gross hematuria with positive urine cytology    POSTOPERATIVE DIAGNOSIS:  Same    PROCEDURES PERFORMED:   1. White and blue light cystourethroscopy following intravesical administration of hexaminolevulinate HCl (Cysview )  2. Bilateral ureteral washings  3. Bilateral retrograde pyelogram  4. Random bladder biopsies    STAFF SURGEON:  Stuart King MD   RESIDENT:   Antonio Spaulding MD    ANESTHESIA: General    ESTIMATED BLOOD LOSS: 2 mL.   DRAINS: None  SPECIMENS: Bilateral ureteral washings for cytology, random bladder biopsies    OPERATIVE INDICATIONS:   Dimple Oviedo is a(n) 84 year old woman with a history of gross hematuria. She was found to have positive urine cytology and underwent cystosocpy with bladder biopsies in February which did not reveal any evidence of malignancy. She continued to have hematuria and a repeat cytology with FISH was positive. Because of this, she elected to undergo a blue-light cystoscopy following intravesical administration of hexaminolevulinate HCl (Cysview ) for biopsy and maximal fulguration, after a discussion of all risks, benefits, and alternatives.    DESCRIPTION OF PROCEDURE:   After verification of informed consent was obtained, the patient was brought to the operating room, and moved to the operating table. After adequate anesthesia was induced, the patient was repositioned in dorsal lithotomy position and prepped and draped in the usual sterile fashion. A formal timeout was performed to confirm the correct patient, procedure and operative site. 60 minutes prior to the start of the procedure, 50 ml of hexaminolevulinate HCl (Cysview ) solution were instilled through a one time catheterization with a 14 Fr catheter.    A 22-Danish rigid cystoscope was inserted into a well lubricated urethra. We began by performing a white light cystourethroscopy. The urethra was unremarkable. The ureteral orifices  were in their orthotopic positions bilaterally. There were no intravesical stones or diverticuli and the bladder was mildly trabeculated. There were several areas on the trigone, lateral, and posterior walls that appeared hypervascular and erythematous although there were no distinct mucosal changes or tumors identified. We then switched to the blue light and did not identify any enhancing lesions.     We then turned our attention to the ureters. The right ureter was cannulated using a 5 Fr open ended catheter and Sensor wire. This was advanced to the renal pelvis and a ureteral washing was performed which was sent for cytology. A thorough retrograde pyelogram was performed which did not reveal any filling defects. Using a new wire and catheter, the same process was repeated on the left side. Again, there were no filling defects identified.    We then used a cold-cup flexible biopsy forceps to biopsy representative areas from the bladder and passed these off for pathology. A Bugby was used to fulgurate arterial bleeders and the biopsied areas. The bladder was re-examined under low pressure and hemostasis was confirmed. At this point, the bladder was drained and the scope removed.    The procedure was then concluded, the patient was awoken from general anesthesia and extubated. She was transferred to the postanesthesia care unit in stable condition and tolerated the procedure well.

## 2018-04-03 NOTE — BRIEF OP NOTE
[facility], [place]    Brief Operative Note    Pre-operative diagnosis: Gross Hematuria   Post-operative diagnosis * No post-op diagnosis entered *  Procedure: Procedure(s):  Cystoscopy, Blue Light Cystoscopy, Bladder Biopsies, Bilateral Selective ureteral washings for Cytology, Bilateral Retrograde Pyelograms, Bilateral Ureteroscopy - Wound Class: II-Clean Contaminated   - Wound Class: II-Clean Contaminated  Surgeon: Surgeon(s) and Role:     * Stuart King MD - Primary     * Elian Yanez - Resident - Assisting     * Antonio Spaulding MD - Resident - Assisting  Anesthesia: General   Estimated blood loss: Minimal  Drains: None  Specimens:   ID Type Source Tests Collected by Time Destination   A : right ureteral washings Washings Ureter, Right CYTOLOGY NON GYN Stuart King MD 4/3/2018 12:34 PM    B : left ureteral washings Washings Ureter, Left CYTOLOGY NON GYN Stuart King MD 4/3/2018 12:36 PM    C : right lateral wall Tissue Bladder SURGICAL PATHOLOGY EXAM Stuart King MD 4/3/2018 12:54 PM    D : posterior wall Tissue Bladder SURGICAL PATHOLOGY EXAM Stuart King MD 4/3/2018 12:55 PM    E : left lateral wall Tissue Bladder SURGICAL PATHOLOGY EXAM Stuart King MD 4/3/2018 12:57 PM    F : dome Tissue Bladder SURGICAL PATHOLOGY EXAM Stuart King MD 4/3/2018 12:58 PM    G : trigone Tissue Bladder SURGICAL PATHOLOGY EXAM Stuart King MD 4/3/2018 12:58 PM      Findings:   Diffuse patchy areas of erythema throughout bladder but no distinct tumors or lesions on white or blue light cystoscopy. Bilateral RPGs unremarkable. Bilateral ureteral washings and random bladder biopsies sent. .  Complications: None.  Implants: None.

## 2018-04-03 NOTE — DISCHARGE INSTRUCTIONS
[facility], [place]  Take it easy when you get home.  Remember, same day surgery DOES NOT MEAN SAME DAY RECOVERY! Healing is a gradual process.   You will need some time to recover- you may be more tired than you realize at first.  Rest and relax for at least the first 24 hours at home.  You'll feel better and heal faster if you take good care of yourself.   Same-Day Surgery   Adult Discharge Orders & Instructions     For 24 hours after surgery    1. Get plenty of rest.  A responsible adult must stay with you for at least 24 hours after you leave the hospital.   2. Do not drive or use heavy equipment.  If you have weakness or tingling, don't drive or use heavy equipment until this feeling goes away.  3. Do not drink alcohol.  4. Avoid strenuous or risky activities.  Ask for help when climbing stairs.   5. You may feel lightheaded.  IF so, sit for a few minutes before standing.  Have someone help you get up.   6. If you have nausea (feel sick to your stomach): Drink only clear liquids such as apple juice, ginger ale, broth or 7-Up.  Rest may also help.  Be sure to drink enough fluids.  Move to a regular diet as you feel able.  7. You may have a slight fever. Call the doctor if your fever is over 100 F (37.7 C) (taken under the tongue) or lasts longer than 24 hours.  8. You may have a dry mouth, a sore throat, muscle aches or trouble sleeping.  These should go away after 24 hours.  9. Do not make important or legal decisions.   Call your doctor for any of the followin.  Signs of infection (fever, growing tenderness at the surgery site, a large amount of drainage or bleeding, severe pain, foul-smelling drainage, redness, swelling).    2. It has been over 8 to 10 hours since surgery and you are still not able to urinate (pass water).    3.  Headache for over 24 hours.    4.  Numbness, tingling or weakness the day after surgery (if you had spinal anesthesia).  Use this number to  contact the clinic during regular buisiness hours only please.  To contact a doctor, call __[phone] [CLINIC]  Urology Clinic number__ or:      Use this number if the clinic is closed; this is a hospital answering service  [phone] and ask for the resident on call for   ___Urology _ (answered 24 hours a day)      Emergency Department:    [facility]: [phone]       (TTY for hearing impaired: [phone])

## 2018-04-03 NOTE — IP AVS SNAPSHOT
MRN:[id number]                      After Visit Summary   4/3/2018    Dimple Oviedo    MRN: [id number]           Thank you!     Thank you for choosing [facility] for your care. Our goal is always to provide you with excellent care. Hearing back from our patients is one way we can continue to improve our services. Please take a few minutes to complete the written survey that you may receive in the mail after you visit with us. Thank you!        Patient Information     Date Of Birth          6/23/1933        About your hospital stay     You were admitted on:  April 3, 2018 You last received care in the:  Same Day Surgery [facility]    You were discharged on:  April 3, 2018       Who to Call     For medical emergencies, please call 911.  For non-urgent questions about your medical care, please call your primary care provider or clinic, [phone]  For questions related to your surgery, please call your surgery clinic        Attending Provider     Provider Stuart Cast MD Urology       Primary Care Provider Office Phone # Fax #    Pop Antonino Zapien [phone]      After Care Instructions     Discharge Instructions       Activity  - You may resume normal activity    Post Procedure  - It is normal to have some burning with urination as well as blood in the urine following this procedure for several days    Medications  - You should take 3 additional days of antibiotics (ciprofloxacin) following the procedure  - You may restart your blood thinners 48 hours after surgery if your urine is clear or light pink. If you are still having heavy bleeding in your urine, you should wait for this to clear before starting your blood thinners.     Follow-Up:  - Follow up with Dr. King to review results of your biopsy.  - Call or return sooner than your regularly scheduled visit if you develop any of the following: fever (greater than 101.5), uncontrolled pain,  "uncontrolled nausea or vomiting, inability to urinate    Phone numbers:   - Monday through Friday 8am to 4:30pm: Call [phone] with questions or to schedule or confirm appointment.    - Nights or weekends: call the after hours emergency pager - [phone] and tell the  \"I would like to page the Urology Resident on call.\"  - For emergencies, call 911                  Your next 10 appointments already scheduled     Apr 19, 2018  9:40 AM CDT   (Arrive by 9:25 AM)   Post-Op with Stuart King MD   [facility] Urology [facility] ([facility])    [address]  4th Floor  [facility] [id number] [phone]            Apr 26, 2018 10:30 AM CDT   (Arrive by 10:15 AM)   Cystoscopy with Kenna La MD   [facility]rology [facility] ([facility])    [address]  4th Floor  [facility] [id number] [phone]            May 21, 2018 12:30 PM CDT   Lab with  LAB   [facility] Lab ([facility])    [address]  1st Floor  [facility] [id number] [phone]            May 21, 2018  1:00 PM CDT   (Arrive by 12:45 PM)   CORE RETURN with NELSON Smart CNP   [facility] Heart Care ([facility])    [address]  [address]  [facility] [address] [phone]            May 29, 2018 10:00 AM CDT   (Arrive by 9:45 AM)   RETURN ENDOCRINE with Dhara Meza MD   [facility] Endocrinology ([facility])    [address]  3rd [address]  [facility] [address] [phone]            Jul 02, 2018 10:00 AM CDT   (Arrive by 9:45 AM)   New Patient Visit with Vinny Duran MD   [facility] Gastroenterology and IBD Clinic ([facility])    [address]  [address]th [facility] [address]   [phone]            Jul 05, 2018  1:30 PM " CDT   (Arrive by 1:15 PM)   NEW ARRHYTHMIA with Butch Griffith MD   [facility] ([facility])    [address]  [address] [place] [address] [phone]              Further instructions from your care team       [facility], [place]  Take it easy when you get home.  Remember, same day surgery DOES NOT MEAN SAME DAY RECOVERY! Healing is a gradual process.   You will need some time to recover- you may be more tired than you realize at first.  Rest and relax for at least the first 24 hours at home.  You'll feel better and heal faster if you take good care of yourself.   Same-Day Surgery   Adult Discharge Orders & Instructions     For 24 hours after surgery    1. Get plenty of rest.  A responsible adult must stay with you for at least 24 hours after you leave the hospital.   2. Do not drive or use heavy equipment.  If you have weakness or tingling, don't drive or use heavy equipment until this feeling goes away.  3. Do not drink alcohol.  4. Avoid strenuous or risky activities.  Ask for help when climbing stairs.   5. You may feel lightheaded.  IF so, sit for a few minutes before standing.  Have someone help you get up.   6. If you have nausea (feel sick to your stomach): Drink only clear liquids such as apple juice, ginger ale, broth or 7-Up.  Rest may also help.  Be sure to drink enough fluids.  Move to a regular diet as you feel able.  7. You may have a slight fever. Call the doctor if your fever is over 100 F (37.7 C) (taken under the tongue) or lasts longer than 24 hours.  8. You may have a dry mouth, a sore throat, muscle aches or trouble sleeping.  These should go away after 24 hours.  9. Do not make important or legal decisions.   Call your doctor for any of the followin.  Signs of infection (fever, growing tenderness at the surgery site, a large amount of drainage or bleeding, severe pain, foul-smelling drainage, redness,  "swelling).    2. It has been over 8 to 10 hours since surgery and you are still not able to urinate (pass water).    3.  Headache for over 24 hours.    4.  Numbness, tingling or weakness the day after surgery (if you had spinal anesthesia).  Use this number to contact the clinic during regular buisiness hours only please.  To contact a doctor, call __[phone] [CLINIC]  Urology Clinic number__ or:      Use this number if the clinic is closed; this is a hospital answering service  [phone] and ask for the resident on call for   ___Urology _ (answered 24 hours a day)      Emergency Department:    [facility]: [phone]       (TTY for hearing impaired: [phone])    Pending Results     Date and Time Order Name Status Description    4/3/2018 1319 Surgical pathology exam In process     4/3/2018 1319 Cytology non gyn In process             Admission Information     Date & Time Provider Department Dept. Phone    4/3/2018 Weight, Stuart Blackmon MD Same Day Surgery [facility] [phone]      Your Vitals Were     Blood Pressure Temperature Respirations Height Weight Pulse Oximetry    130/41 96.8  F (36  C) 20 1.448 m (4' 9\") 66.5 kg (146 lb 9.7 oz) 99%    BMI (Body Mass Index)                   31.73 kg/m2           MyChart Information     [employer] gives you secure access to your electronic health record. If you see a primary care provider, you can also send messages to your care team and make appointments. If you have questions, please call your primary care clinic.  If you do not have a primary care provider, please call [phone] and they will assist you.        Care EveryWhere ID     This is your Care EveryWhere ID. This could be used by other organizations to access your [facility] medical records  [id number]        Equal Access to Services     GUNNER RODRIGUEZ : Aleja Pollack, nadia barnard, maldonado lui. So george " [phone].    ATENCIÓN: Si soni alanis, tiene a sierra disposición servicios gratuitos de asistencia lingüística. Al leon [phone].    We comply with applicable federal civil rights laws and [place] laws. We do not discriminate on the basis of race, color, national origin, age, disability, sex, sexual orientation, or gender identity.               Review of your medicines      CONTINUE these medicines which may have CHANGED, or have new prescriptions. If we are uncertain of the size of tablets/capsules you have at home, strength may be listed as something that might have changed.        Dose / Directions    irbesartan 300 MG tablet   Commonly known as:  AVAPRO   This may have changed:    - how much to take  - how to take this  - when to take this  - additional instructions   Used for:  Essential hypertension with goal blood pressure less than 140/90        TAKE 1 TABLET EVERY DAY   Quantity:  90 tablet   Refills:  2       methimazole 5 MG tablet   Commonly known as:  TAPAZOLE   This may have changed:  when to take this   Used for:  Nontoxic multinodular goiter        Dose:  5 mg   Take 1 tablet (5 mg) by mouth daily   Quantity:  90 tablet   Refills:  1       omeprazole 20 MG CR capsule   Commonly known as:  priLOSEC   This may have changed:  when to take this   Used for:  Gastroesophageal reflux disease without esophagitis        Dose:  20 mg   Take 1 capsule (20 mg) by mouth daily   Quantity:  180 capsule   Refills:  3       propranolol 60 MG 24 hr capsule   Commonly known as:  INDERAL LA   This may have changed:  when to take this   Used for:  Nontoxic multinodular goiter        Dose:  60 mg   Take 1 capsule (60 mg) by mouth daily   Quantity:  90 capsule   Refills:  2       sertraline 100 MG tablet   Commonly known as:  ZOLOFT   This may have changed:  when to take this   Used for:  THERON (generalized anxiety disorder)        Dose:  100 mg   Take 1 tablet (100 mg) by mouth daily   Quantity:  90 tablet  "  Refills:  1       traZODone 50 MG tablet   Commonly known as:  DESYREL   This may have changed:    - how much to take  - when to take this   Used for:  Insomnia, unspecified type        Dose:  50 mg   Take 1 tablet (50 mg) by mouth nightly as needed for sleep   Quantity:  30 tablet   Refills:  1         CONTINUE these medicines which have NOT CHANGED        Dose / Directions    acetaminophen 650 MG 8 hour tablet   Used for:  Ascending cholangitis        Dose:  650 mg   Take 650 mg by mouth every 8 hours as needed for mild pain or fever   Quantity:  100 tablet   Refills:  0       alendronate 70 MG tablet   Commonly known as:  FOSAMAX   Used for:  High risk medication use, Osteopenia        TAKE 1 TABLET EVERY 7 DAYS AT LEAST 60 MIN BEFORE BREAKFAST AS DIRECTED \"SEE PACKAGE FOR ADDITIONAL INSTRUCTIONS\"   Quantity:  12 tablet   Refills:  2       ASPIRIN PO        Dose:  81 mg   Take 81 mg by mouth At Bedtime   Refills:  0       CALCIUM + D PO        Take by mouth 2 times daily   Refills:  0       ciprofloxacin 250 MG tablet   Commonly known as:  CIPRO   Used for:  Urinary tract infection with hematuria, site unspecified        Dose:  250 mg   Take 1 tablet (250 mg) by mouth 2 times daily for 3 days   Quantity:  6 tablet   Refills:  0       CRANBERRY CONCENTRATE PO        Dose:  2 capsule   Take 2 capsules by mouth At Bedtime   Refills:  0       cycloSPORINE 0.05 % ophthalmic emulsion   Commonly known as:  RESTASIS        Dose:  1 drop   Place 1 drop into both eyes 2 times daily   Refills:  0       diazepam 2 MG tablet   Commonly known as:  VALIUM   Used for:  THERON (generalized anxiety disorder)        Dose:  2 mg   Take 1 tablet (2 mg) by mouth every 12 hours as needed for anxiety or sleep   Quantity:  20 tablet   Refills:  0       Fish Oil 500 MG Caps        Dose:  1 capsule   Take 1 capsule by mouth 2 times daily   Refills:  0       lovastatin 40 MG tablet   Commonly known as:  MEVACOR   Used for:  Hyperlipidemia " LDL goal <130        TAKE 1 TABLET AT BEDTIME (HYPERLIPIDEMIA LDL GOAL  BELOW 130)   Quantity:  90 tablet   Refills:  2       Melatonin 10 MG Tabs tablet        Dose:  10 mg   Take 10 mg by mouth nightly as needed for sleep   Refills:  0       METAMUCIL FIBER PO        Take by mouth 3 times daily   Refills:  0       nitroGLYcerin 0.4 MG sublingual tablet   Commonly known as:  NITROSTAT   Used for:  Elevated troponin        For chest pain place 1 tablet under the tongue every 5 minutes for 3 doses. If symptoms persist 5 minutes after 1st dose call 911.   Quantity:  25 tablet   Refills:  3       SYSTANE 0.4-0.3 % Soln ophthalmic solution   Generic drug:  polyethylene glycol 0.4%- propylene glycol 0.3%        Dose:  1 drop   Place 1 drop into both eyes 3 times daily as needed for dry eyes   Refills:  0       ZANTAC PO        Take by mouth twice a week 2 times weekly and BID   Refills:  0            Where to get your medicines      These medications were sent to [facility] [address] [address]     Phone:  [phone]     ciprofloxacin 250 MG tablet                Protect others around you: Learn how to safely use, store and throw away your medicines at www.disposemymeds.org.        ANTIBIOTIC INSTRUCTION     You've Been Prescribed an Antibiotic - Now What?  Your healthcare team thinks that you or your loved one might have an infection. Some infections can be treated with antibiotics, which are powerful, life-saving drugs. Like all medications, antibiotics have side effects and should only be used when necessary. There are some important things you should know about your antibiotic treatment.      Your healthcare team may run tests before you start taking an antibiotic.    Your team may take samples (e.g., from your blood, urine or other areas) to run tests to look for bacteria. These test can be important to determine if you need an  antibiotic at all and, if you do, which antibiotic will work best.      Within a few days, your healthcare team might change or even stop your antibiotic.    Your team may start you on an antibiotic while they are working to find out what is making you sick.    Your team might change your antibiotic because test results show that a different antibiotic would be better to treat your infection.    In some cases, once your team has more information, they learn that you do not need an antibiotic at all. They may find out that you don't have an infection, or that the antibiotic you're taking won't work against your infection. For example, an infection caused by a virus can't be treated with antibiotics. Staying on an antibiotic when you don't need it is more likely to be harmful than helpful.      You may experience side effects from your antibiotic.    Like all medications, antibiotics have side effects. Some of these can be serious.    Let you healthcare team know if you have any known allergies when you are admitted to the hospital.    One significant side effect of nearly all antibiotics is the risk of severe and sometimes deadly diarrhea caused by Clostridium difficile (C. Difficile). This occurs when a person takes antibiotics because some good germs are destroyed. Antibiotic use allows C. diificile to take over, putting patients at high risk for this serious infection.    As a patient or caregiver, it is important to understand your or your loved one's antibiotic treatment. It is especially important for caregivers to speak up when patients can't speak for themselves. Here are some important questions to ask your healthcare team.    What infection is this antibiotic treating and how do you know I have that infection?    What side effects might occur from this antibiotic?    How long will I need to take this antibiotic?    Is it safe to take this antibiotic with other medications or supplements (e.g., vitamins)  "that I am taking?     Are there any special directions I need to know about taking this antibiotic? For example, should I take it with food?    How will I be monitored to know whether my infection is responding to the antibiotic?    What tests may help to make sure the right antibiotic is prescribed for me?      Information provided by:  www.cdc.gov/getsmart  U.S. Department of Health and Human Services  Centers for disease Control and Prevention  National Center for Emerging and Zoonotic Infectious Diseases  Division of Healthcare Quality Promotion             Medication List: This is a list of all your medications and when to take them. Check marks below indicate your daily home schedule. Keep this list as a reference.      Medications           Morning Afternoon Evening Bedtime As Needed    acetaminophen 650 MG 8 hour tablet   Take 650 mg by mouth every 8 hours as needed for mild pain or fever                                alendronate 70 MG tablet   Commonly known as:  FOSAMAX   TAKE 1 TABLET EVERY 7 DAYS AT LEAST 60 MIN BEFORE BREAKFAST AS DIRECTED \"SEE PACKAGE FOR ADDITIONAL INSTRUCTIONS\"                                ASPIRIN PO   Take 81 mg by mouth At Bedtime                                CALCIUM + D PO   Take by mouth 2 times daily                                ciprofloxacin 250 MG tablet   Commonly known as:  CIPRO   Take 1 tablet (250 mg) by mouth 2 times daily for 3 days                                CRANBERRY CONCENTRATE PO   Take 2 capsules by mouth At Bedtime                                cycloSPORINE 0.05 % ophthalmic emulsion   Commonly known as:  RESTASIS   Place 1 drop into both eyes 2 times daily                                diazepam 2 MG tablet   Commonly known as:  VALIUM   Take 1 tablet (2 mg) by mouth every 12 hours as needed for anxiety or sleep                                Fish Oil 500 MG Caps   Take 1 capsule by mouth 2 times daily                                irbesartan 300 MG " tablet   Commonly known as:  AVAPRO   TAKE 1 TABLET EVERY DAY                                lovastatin 40 MG tablet   Commonly known as:  MEVACOR   TAKE 1 TABLET AT BEDTIME (HYPERLIPIDEMIA LDL GOAL  BELOW 130)                                Melatonin 10 MG Tabs tablet   Take 10 mg by mouth nightly as needed for sleep                                METAMUCIL FIBER PO   Take by mouth 3 times daily                                methimazole 5 MG tablet   Commonly known as:  TAPAZOLE   Take 1 tablet (5 mg) by mouth daily                                nitroGLYcerin 0.4 MG sublingual tablet   Commonly known as:  NITROSTAT   For chest pain place 1 tablet under the tongue every 5 minutes for 3 doses. If symptoms persist 5 minutes after 1st dose call 911.                                omeprazole 20 MG CR capsule   Commonly known as:  priLOSEC   Take 1 capsule (20 mg) by mouth daily                                propranolol 60 MG 24 hr capsule   Commonly known as:  INDERAL LA   Take 1 capsule (60 mg) by mouth daily                                sertraline 100 MG tablet   Commonly known as:  ZOLOFT   Take 1 tablet (100 mg) by mouth daily                                SYSTANE 0.4-0.3 % Soln ophthalmic solution   Place 1 drop into both eyes 3 times daily as needed for dry eyes   Generic drug:  polyethylene glycol 0.4%- propylene glycol 0.3%                                traZODone 50 MG tablet   Commonly known as:  DESYREL   Take 1 tablet (50 mg) by mouth nightly as needed for sleep                                ZANTAC PO   Take by mouth twice a week 2 times weekly and BID

## 2018-04-03 NOTE — IP AVS SNAPSHOT
Same Day Surgery [address] [address]    Phone:  [phone]                                       After Visit Summary   4/3/2018    Dimple Oviedo    MRN: [id number]           After Visit Summary Signature Page     I have received my discharge instructions, and my questions have been answered. I have discussed any challenges I see with this plan with the nurse or doctor.    ..........................................................................................................................................  Patient/Patient Representative Signature      ..........................................................................................................................................  Patient Representative Print Name and Relationship to Patient    ..................................................               ................................................  Date                                            Time    ..........................................................................................................................................  Reviewed by Signature/Title    ...................................................              ..............................................  Date                                                            Time

## 2018-04-03 NOTE — ANESTHESIA CARE TRANSFER NOTE
Patient: Dimple Oviedo    Procedure(s):  Cystoscopy, Blue Light Cystoscopy, Bladder Biopsies, Bilateral Selective ureteral washings for Cytology, Bilateral Retrograde Pyelograms, Bilateral Ureteroscopy - Wound Class: II-Clean Contaminated   - Wound Class: II-Clean Contaminated    Diagnosis: Gross Hematuria   Diagnosis Additional Information: No value filed.    Anesthesia Type:   General, ETT     Note:  Airway :Face Mask  Patient transferred to:PACU  Comments:   Spontaneous respirations, oral suctioned, bilateral eye opening and hand grasps.  Extubated to FM O2 6lpm.  VSS to PACU.      Vitals: (Last set prior to Anesthesia Care Transfer)    CRNA VITALS  4/3/2018 1240 - 4/3/2018 1313      4/3/2018             Pulse: 71    SpO2: 100 %    Resp Rate (observed): (!)  1    Resp Rate (set): 10                Electronically Signed By: NELSON Flores CRNA  April 3, 2018  1:13 PM

## 2018-04-03 NOTE — OR NURSING
Bladder scan showed 17-84ml after voiding. This is the patient's second time to voiding. Feels as if she is fully emptying bladder at this time.

## 2018-04-03 NOTE — OR NURSING
Call to Dr Mcknight to evaluate pt for Phase II; /50 with MAP 65-70, HR is 50, SB. Pt denies dizziness, is not symptomatic. Will continue to monitor. Pt able to ambulate to BR with assist, no dizziness per pt.

## 2018-04-03 NOTE — OR NURSING
Pt up to BR with assist, able to void small amount. Seen at bedside by Dr Mcknight. Assessed VS and HR, pt has some bladder discomfort, denies nausea, not dizzy or light-headed. Stable to transfer to Phase ii for further care and monitoring.

## 2018-04-03 NOTE — ANESTHESIA POSTPROCEDURE EVALUATION
Patient: Dimple Oviedo    Procedure(s):  Cystoscopy, Blue Light Cystoscopy, Bladder Biopsies, Bilateral Selective ureteral washings for Cytology, Bilateral Retrograde Pyelograms, Bilateral Ureteroscopy - Wound Class: II-Clean Contaminated   - Wound Class: II-Clean Contaminated    Diagnosis:Gross Hematuria   Diagnosis Additional Information: No value filed.    Anesthesia Type:  General, ETT    Note:  Anesthesia Post Evaluation    Patient location during evaluation: PACU  Patient participation: Able to fully participate in evaluation  Level of consciousness: awake and alert  Pain management: adequate  Airway patency: patent  Cardiovascular status: acceptable and hemodynamically stable  Respiratory status: acceptable  Hydration status: acceptable  PONV: none     Anesthetic complications: None          Last vitals:  Vitals:    04/03/18 1400 04/03/18 1415 04/03/18 1430   BP: 98/42 (!) 100/39 103/49   Resp: 15 14 14   Temp: 36.1  C (97  F) 36.1  C (97  F) 36  C (96.8  F)   SpO2: 99% 98% 99%         Electronically Signed By: Nain Mcknight MD  April 3, 2018  3:19 PM

## 2018-04-03 NOTE — PROGRESS NOTES
Pre-op delayed d/t difficultly placing IV. Pt does not have a history of difficult IV starts but required ultrasound with vascular access team. Cysview was ordered after Dr. King saw the patient. Catheter insertion took several attempts. Cysview was instilled at 1148. Pt anxious, tearful and frustrated. Family at bedside prior to pt leaving for OR.

## 2018-04-04 LAB
COPATH REPORT: NORMAL
COPATH REPORT: NORMAL

## 2018-04-09 NOTE — TELEPHONE ENCOUNTER
I called patient and left detailed voice mail telling her order was sent 3/9/18 for a total of 9 month supply to [facility].  I told her if she had a different concern she can call back and give that question to the phone rep and they can route to nurses.    Leesa POLLARD      irbesartan (AVAPRO) 300 MG tablet 90 tablet 2 3/9/2018  No      Sig: TAKE 1 TABLET EVERY DAY     Patient taking differently: Take 300 mg by mouth every morning TAKE 1 TABLET EVERY DAY

## 2018-04-16 ENCOUNTER — PRE VISIT (OUTPATIENT)
Dept: UROLOGY | Facility: CLINIC | Age: 83
End: 2018-04-16

## 2018-04-16 NOTE — TELEPHONE ENCOUNTER
Post op.  S/P bladder bx on 4-3-18.  Discuss path results.  Records available in [facility].

## 2018-04-19 ENCOUNTER — OFFICE VISIT (OUTPATIENT)
Dept: UROLOGY | Facility: CLINIC | Age: 83
End: 2018-04-19
Payer: MEDICARE

## 2018-04-19 ENCOUNTER — TRANSFERRED RECORDS (OUTPATIENT)
Dept: HEALTH INFORMATION MANAGEMENT | Facility: CLINIC | Age: 83
End: 2018-04-19

## 2018-04-19 DIAGNOSIS — R31.0 GROSS HEMATURIA: Primary | ICD-10-CM

## 2018-04-19 NOTE — MR AVS SNAPSHOT
After Visit Summary   4/19/2018    Dimple Oviedo    MRN: [id number]           Patient Information     Date Of Birth          6/23/1933        Visit Information        Provider Department      4/19/2018 9:40 AM Christine, Stuart Blackmon MD [facility]rology and [facility]state [facility] Urologic C[facility]        Care Instructions    Follow up with Dr. King in 3 months.    It was a pleasure meeting with you today.  Thank you for allowing me and my team the privilege of caring for you today.  YOU are the reason we are here, and I truly hope we provided you with the excellent service you deserve.  Please let us know if there is anything else we can do for you so that we can be sure you are leaving completely satisfied with your care experience.      Virginia Velázquez [place]          Follow-ups after your visit        Your next 10 appointments already scheduled     May 21, 2018 12:30 PM CDT   Lab with [facility]   [facility] ([facility])    [address] [place] [address]  1st Floor  [facility] [address]   [phone]            May 21, 2018  1:00 PM CDT   (Arrive by 12:45 PM)   CORE RETURN with NELSON Smart [facility] ([facility])    [address] [place] [address]  [address]  [facility] [id number] [phone]            May 29, 2018 10:00 AM CDT   (Arrive by 9:45 AM)   RETURN ENDOCRINE with Dhara Meza MD   [facility] ([facility])    [address] [place] [address]  3rd Floor  [facility] [id number] [phone]            Jul 02, 2018 10:00 AM CDT   (Arrive by 9:45 AM)   New Patient Visit with Vinny Duran MD   [facility] ([facility])    [address] [place] [address]  4th Floor  [facility] [address]   [phone]            Jul 05, 2018  1:30 PM CDT   (Arrive by 1:15 PM)   NEW ARRHYTHMIA with Butch Griffith MD   [facility] ([facility]  [facility] [place])    [address]  [address]  [facility] [address]   [phone]            Jul 26, 2018  2:20 PM CDT   (Arrive by 2:05 PM)   Return Visit with Stuart King MD   [facility] Urology and [facility] for Prostate and Urologic Cancers ([facility] [place])    [address]  4th Floor  [facility] [id number] [phone]              Who to contact     Please call your clinic at [phone] to:    Ask questions about your health    Make or cancel appointments    Discuss your medicines    Learn about your test results    Speak to your doctor            Additional Information About Your Visit        [employer] Information     [employer] gives you secure access to your electronic health record. If you see a primary care provider, you can also send messages to your care team and make appointments. If you have questions, please call your primary care clinic.  If you do not have a primary care provider, please call [phone] and they will assist you.      [employer] is an electronic gateway that provides easy, online access to your medical records. With [employer], you can request a clinic appointment, read your test results, renew a prescription or communicate with your care team.     To access your existing account, please contact your [facility] or call [phone] for assistance.        Care EveryWhere ID     This is your Care EveryWhere ID. This could be used by other organizations to access your [facility] medical records  [id number]         Blood Pressure from Last 3 Encounters:   04/03/18 130/59   03/29/18 116/66   03/19/18 113/47    Weight from Last 3 Encounters:   04/03/18 66.5 kg (146 lb 9.7 oz)   03/29/18 67.2 kg (148 lb 3.2 oz)   03/22/18 67.6 kg (149 lb)              Today, you had the following     No orders found for display         Today's Medication Changes          These changes are accurate as of 4/19/18 12:38 PM.   If you have any questions, ask your nurse or doctor.               These medicines have changed or have updated prescriptions.        Dose/Directions    irbesartan 300 MG tablet   Commonly known as:  AVAPRO   This may have changed:    - how much to take  - how to take this  - when to take this  - additional instructions   Used for:  Essential hypertension with goal blood pressure less than 140/90        TAKE 1 TABLET EVERY DAY   Quantity:  90 tablet   Refills:  2       methimazole 5 MG tablet   Commonly known as:  TAPAZOLE   This may have changed:  when to take this   Used for:  Nontoxic multinodular goiter        Dose:  5 mg   Take 1 tablet (5 mg) by mouth daily   Quantity:  90 tablet   Refills:  1       omeprazole 20 MG CR capsule   Commonly known as:  priLOSEC   This may have changed:  when to take this   Used for:  Gastroesophageal reflux disease without esophagitis        Dose:  20 mg   Take 1 capsule (20 mg) by mouth daily   Quantity:  180 capsule   Refills:  3       propranolol 60 MG 24 hr capsule   Commonly known as:  INDERAL LA   This may have changed:  when to take this   Used for:  Nontoxic multinodular goiter        Dose:  60 mg   Take 1 capsule (60 mg) by mouth daily   Quantity:  90 capsule   Refills:  2       sertraline 100 MG tablet   Commonly known as:  ZOLOFT   This may have changed:  when to take this   Used for:  THERON (generalized anxiety disorder)        Dose:  100 mg   Take 1 tablet (100 mg) by mouth daily   Quantity:  90 tablet   Refills:  1       traZODone 50 MG tablet   Commonly known as:  DESYREL   This may have changed:    - how much to take  - when to take this   Used for:  Insomnia, unspecified type        Dose:  50 mg   Take 1 tablet (50 mg) by mouth nightly as needed for sleep   Quantity:  30 tablet   Refills:  1                Primary Care Provider Office Phone # Fax #    Pop Zapien [phone] [address] [place] [address]        Equal  "Access to Services     [facility]: Hadii shameka chacon bryce Pollack, waaxda luqadaha, qaybta kagus basiliaberrycristian, waxmike marianne enamoradomianannemarie bey. So [facility] [phone].    ATENCIÓN: Si habla español, tiene a sierra disposición servicios gratuitos de asistencia lingüística. Ryaname al [phone].    We comply with applicable federal civil rights laws and [place] laws. We do not discriminate on the basis of race, color, national origin, age, disability, sex, sexual orientation, or gender identity.            Thank you!     Thank you for choosing [facility] UROLOGY AND [facility] FOR PROSTATE AND UROLOGIC CANCERS  for your care. Our goal is always to provide you with excellent care. Hearing back from our patients is one way we can continue to improve our services. Please take a few minutes to complete the written survey that you may receive in the mail after your visit with us. Thank you!             Your Updated Medication List - Protect others around you: Learn how to safely use, store and throw away your medicines at www.disposemymeds.org.          This list is accurate as of 4/19/18 12:38 PM.  Always use your most recent med list.                   Brand Name Dispense Instructions for use Diagnosis    acetaminophen 650 MG 8 hour tablet     100 tablet    Take 650 mg by mouth every 8 hours as needed for mild pain or fever    Ascending cholangitis       alendronate 70 MG tablet    FOSAMAX    12 tablet    TAKE 1 TABLET EVERY 7 DAYS AT LEAST 60 MIN BEFORE BREAKFAST AS DIRECTED \"SEE PACKAGE FOR ADDITIONAL INSTRUCTIONS\"    High risk medication use, Osteopenia       ASPIRIN PO      Take 81 mg by mouth At Bedtime        CALCIUM + D PO      Take by mouth 2 times daily        CRANBERRY CONCENTRATE PO      Take 2 capsules by mouth At Bedtime        cycloSPORINE 0.05 % ophthalmic emulsion    RESTASIS     Place 1 drop into both eyes 2 times daily        diazepam 2 MG tablet    VALIUM    20 tablet    Take 1 tablet (2 mg) by mouth " every 12 hours as needed for anxiety or sleep    THERON (generalized anxiety disorder)       Fish Oil 500 MG Caps      Take 1 capsule by mouth 2 times daily        irbesartan 300 MG tablet    AVAPRO    90 tablet    TAKE 1 TABLET EVERY DAY    Essential hypertension with goal blood pressure less than 140/90       lovastatin 40 MG tablet    MEVACOR    90 tablet    TAKE 1 TABLET AT BEDTIME (HYPERLIPIDEMIA LDL GOAL  BELOW 130)    Hyperlipidemia LDL goal <130       Melatonin 10 MG Tabs tablet      Take 10 mg by mouth nightly as needed for sleep        METAMUCIL FIBER PO      Take by mouth 3 times daily        methimazole 5 MG tablet    TAPAZOLE    90 tablet    Take 1 tablet (5 mg) by mouth daily    Nontoxic multinodular goiter       nitroGLYcerin 0.4 MG sublingual tablet    NITROSTAT    25 tablet    For chest pain place 1 tablet under the tongue every 5 minutes for 3 doses. If symptoms persist 5 minutes after 1st dose call 911.    Elevated troponin       omeprazole 20 MG CR capsule    priLOSEC    180 capsule    Take 1 capsule (20 mg) by mouth daily    Gastroesophageal reflux disease without esophagitis       propranolol 60 MG 24 hr capsule    INDERAL LA    90 capsule    Take 1 capsule (60 mg) by mouth daily    Nontoxic multinodular goiter       sertraline 100 MG tablet    ZOLOFT    90 tablet    Take 1 tablet (100 mg) by mouth daily    THERON (generalized anxiety disorder)       SYSTANE 0.4-0.3 % Soln ophthalmic solution   Generic drug:  polyethylene glycol 0.4%- propylene glycol 0.3%      Place 1 drop into both eyes 3 times daily as needed for dry eyes        traZODone 50 MG tablet    DESYREL    30 tablet    Take 1 tablet (50 mg) by mouth nightly as needed for sleep    Insomnia, unspecified type       ZANTAC PO      Take by mouth twice a week 2 times weekly and BID

## 2018-04-19 NOTE — PATIENT INSTRUCTIONS
Follow up with Dr. King in 3 months.    It was a pleasure meeting with you today.  Thank you for allowing me and my team the privilege of caring for you today.  YOU are the reason we are here, and I truly hope we provided you with the excellent service you deserve.  Please let us know if there is anything else we can do for you so that we can be sure you are leaving completely satisfied with your care experience.      JEAN CLAUDE Morales

## 2018-04-19 NOTE — PROGRESS NOTES
Patient came over 1 hour late and I was unable to see her but was seen by my nurse and communicated that all biopsies were negative!     Dimple Oviedo is a very pleasant 84 year old female who presents with a history of positive FISH and atypical cytology    She underwent bladder biopsy and washings on 4/3/18     Assessment:   History of hematuria, positive FISH    Negative washings, random biopsies etc.    F/u in 3 months with cysto and cytology    Stuart King MD  Department of Urology [facility]

## 2018-04-19 NOTE — LETTER
4/19/2018       RE: Dimple Oviedo  [address]   [facility] [address]     Dear Colleague,    Thank you for referring your patient, Dimple Oviedo, to the [facility] UROLOGY AND INST FOR PROSTATE AND UROLOGIC CANCERS at [facility]. Please see a copy of my visit note below.     Patient came over 1 hour late and I was unable to see her but was seen by my nurse and communicated that all biopsies were negative!     Dimple Oviedo is a very pleasant 84 year old female who presents with a history of positive FISH and atypical cytology    She underwent bladder biopsy and washings on 4/3/18     Assessment:   History of hematuria, positive FISH    Negative washings, random biopsies etc.    F/u in 3 months with cysto and cytology      Again, thank you for allowing me to participate in the care of your patient.      Sincerely,    Stuart King MD

## 2018-04-24 ENCOUNTER — TELEPHONE (OUTPATIENT)
Dept: FAMILY MEDICINE | Facility: CLINIC | Age: 83
End: 2018-04-24

## 2018-04-24 NOTE — TELEPHONE ENCOUNTER
3/19/18 ov talked about weaning off omeprazole.    Routing question to pcp.  Reception really needs to ask about pharmacy. This would be greatly helpful. Sent this back to reception.  ATUL Lee

## 2018-04-24 NOTE — TELEPHONE ENCOUNTER
Returned call to patient to confirm the pharmacy if a switch is to be made. Writer did not initially confirm the pharmacy as it was a medication question and the patient would like a call back PRIOR to any change so she can discuss and understand what she is to do. Was able to reach patient - states if it is changed today [facility] [address] [place], [place] - [address] AT [facility] but if it is tomorrow she would like [facility] [address] [address] AT [facility] & [address] [place] [address].

## 2018-04-24 NOTE — TELEPHONE ENCOUNTER
PT will go back on  omeprazole,but it may she can take it 20 mg twice a day for 2 weeks and then switch it to 20 mg once a day.  Will stop the zantac.  Pt has refills at mailorder.  Reviewed message with pt and MICHAEL.  ATUL Lee

## 2018-04-24 NOTE — TELEPHONE ENCOUNTER
Nexium is similar to omeprazole and it has similar side effects.  So if she is nervous about side effect of Nexium is not superior.  If you look at the risk versus benefit and due to her persistent heartburn I do recommend she should go back to omeprazole,but it may she can take it 20 mg twice a day for 2 weeks and then switch it to 20 mg once a day.     if she still wants me to I can switch it to Nexium -okay to send a prescription of Nexium 20 mg once a day to her preferred pharmacy with the diagnosis of GERD.

## 2018-04-24 NOTE — TELEPHONE ENCOUNTER
She can take omeprazole as needed in bet ween ranitidine but try to avoid it on regular basis if possible.

## 2018-04-24 NOTE — TELEPHONE ENCOUNTER
Reason for Call:  Medication or medication refill:    Name of the medication requested: RaNITidine HCl (ZANTAC PO)    Other request: Patient states she was on Omeprazole but per PCP request, weaned off. States she was switched to RaNITidine HCl (ZANTAC PO) howrver it is causing her acid reflux. She is hoping something else can be prescribed instead. Please assist. Thanks!    Can we leave a detailed message on this number? YES    Phone number patient can be reached at: Other phone number: [phone]    Best Time: Any    Call taken on 4/24/2018 at 11:12 AM by Belkis Antony

## 2018-04-24 NOTE — TELEPHONE ENCOUNTER
I talked to pt and her DILBertha.    Pt says her heartburn is so bad she has to sit up in a chair at night to sleep.  Is there another med she can try?  Nexium was mentioned.    The ranitidine is not helping. How often can she take the omeprazole? Not daily?  Pt requested a different med.  ATUL Lee

## 2018-04-30 ENCOUNTER — OFFICE VISIT (OUTPATIENT)
Dept: FAMILY MEDICINE | Facility: CLINIC | Age: 83
End: 2018-04-30
Payer: MEDICARE

## 2018-04-30 VITALS
OXYGEN SATURATION: 97 % | BODY MASS INDEX: 32.68 KG/M2 | HEART RATE: 54 BPM | RESPIRATION RATE: 16 BRPM | TEMPERATURE: 98.3 F | SYSTOLIC BLOOD PRESSURE: 134 MMHG | WEIGHT: 151 LBS | DIASTOLIC BLOOD PRESSURE: 58 MMHG

## 2018-04-30 DIAGNOSIS — S69.91XA INJURY OF FINGER OF RIGHT HAND, INITIAL ENCOUNTER: Primary | ICD-10-CM

## 2018-04-30 PROCEDURE — 99213 OFFICE O/P EST LOW 20 MIN: CPT | Performed by: FAMILY MEDICINE

## 2018-04-30 NOTE — PROGRESS NOTES
SUBJECTIVE:   Dimple Oviedo is a 84 year old female who presents to clinic today for the following health issues:    Ear Problem       Duration: this morning     Description (location/character/radiation): was making bed and noticed blood on pillow cases and thinks possibly from right ear, which she sleeps on    Intensity:  Mild pain in right ear    Accompanying signs and symptoms: tension in teeth     History (similar episodes/previous evaluation): None    Precipitating or alleviating factors: None    Therapies tried and outcome: None     Not sure where it is from.  Had recent audiologist evaluation and wax was removed.  She is noticing some ear discomfort.   Had a small cut on right finger but not sure if that was bleeding. She put band aid this am but not band aid last night.     Still blood in urine. Does not think that is the cause.     Had to resume omeprazole again.     Was seen by GI and was given cholestid. Her diarrhea improved.     Problem list and histories reviewed & adjusted, as indicated.  Additional history: as documented    Labs reviewed in EPIC    Reviewed and updated as needed this visit by clinical staff    Reviewed and updated as needed this visit by Provider    Social History     Social History     Marital status:      Spouse name:      Number of children: 2     Years of education: N/A     Occupational History      Retired     Social History Main Topics     Smoking status: Never Smoker     Smokeless tobacco: Never Used     Alcohol use No      Comment: 6 times per year-one drink     Drug use: No     Sexual activity: Yes     Partners: Male     Other Topics Concern      Service No     Blood Transfusions No     Exercise Yes     curves     Seat Belt Yes     Self-Exams Yes     Parent/Sibling W/ Cabg, Mi Or Angioplasty Before 65f 55m? No     Social History Narrative    December 7, 2009    Balanced Diet - Yes    Osteoporosis Prevention Measures - Dairy servings per day: 2 and  Medication/Supplements (See current meds)    Regular Exercise -  Yes Describe curves, walking    Dental Exam - YES - Date: 10/2009    Eye Exam - YES - Date: 2008    Self Breast Exam - Yes    Abuse: Current or Past (Physical, Sexual or Emotional)- No    Do you feel safe in your environment - Yes    Guns stored in the home - No    Sunscreen used - Yes    Seatbelts used - Yes    Lipids -  YES - Date: 11/24/2009    Glucose -  YES - Date: 11/24/2009    Colon Cancer Screening - Colonoscopy 11/18/2005(date completed)    Hemoccults - YES - Date: 10/12/2004    Pap Test -  YES - Date: 09/22/2004    Do you have any concerns about STD's -  No    Mammography - YES - Date: 11/24/2009    DEXA - YES - Date: 11/20/2008    Immunizations reviewed and up to date - Yes    PAULETTE Spencer CMA                 Allergies   Allergen Reactions     No Known Drug Allergies      Patient Active Problem List   Diagnosis     Esophageal reflux     restless leg     heat intolerance     Goiter     Disorder of bone and cartilage     Other psoriasis     perirectal cyst     Malignant melanoma of skin of trunk, except scrotum (H)     Undiagnosed cardiac murmurs     Nontoxic multinodular goiter     Hyperlipidemia LDL goal <130     Hypertension goal BP (blood pressure) < 140/90     Urinary incontinence     Osteoarthritis of left knee     Hip arthritis     Polymyalgia rheumatica (H)     High risk medication use     Shoulder pain     Sepsis (H)     Impaired fasting blood sugar     Chronic bilateral low back pain without sciatica     Obesity, unspecified obesity severity, unspecified obesity type     Iron deficiency anemia, unspecified iron deficiency anemia type     NSTEMI (non-ST elevated myocardial infarction) (H)     Stress-induced cardiomyopathy     A-fib (H)     Atrial fibrillation, unspecified type (H)     Anxiety     Chronic systolic heart failure (H)     Urothelial carcinoma (H)     Reviewed medications, social history and  past medical and surgical  history.    Review of system: for general, respiratory, CVS, GI and psychiatry negative except for noted above.     EXAM:  /58 (Cuff Size: Adult Regular)  Pulse 54  Temp 98.3  F (36.8  C) (Oral)  Resp 16  Wt 151 lb (68.5 kg)  SpO2 97%  BMI 32.68 kg/m2  Constitutional: healthy, alert and no distress   Psychiatric: mentation appears normal and affect normal/bright  Cardiovascular: RRR. No murmurs,  Respiratory: negative, Lungs clear. No crackles or wheezing. No tachypnea.   Head: Normocephalic. No masses, lesions, tenderness or abnormalities  ENT: Both TM exam normal, hearing aids both ears  Right index finger -band aid present.    ASSESSMENT / PLAN:  (S69.91XA) Injury of finger of right hand, initial encounter  (primary encounter diagnosis)  Comment:    Plan: She notices blood on her bed sheet.  And she felt like it was coming out from her ear.  Both of her ears were recently examined by audiologist and have had her cerumen removal and she felt like it could be a culprit.  Today I examined her both ears and both of the ear canals and tympanic membranes are normal I do not see any acute sign of bleeding or any scabs that suggestive of her recent bleeding.  Only other thing could be from small injury from her finger and she admits she was not wearing a Band-Aid last night and the bleeding could be from that.  Obviously there are other possibilities that can cause bleeding but I do not see or find any obvious etiology that I can contribute to and she is going to keep an eye on her symptoms.

## 2018-04-30 NOTE — MR AVS SNAPSHOT
After Visit Summary   4/30/2018    Dimple Oviedo    MRN: [id number]           Patient Information     Date Of Birth          6/23/1933        Visit Information        Provider Department      4/30/2018 3:00 PM Pop Zapien MD [facility]        Today's Diagnoses     Injury of finger of right hand, initial encounter    -  1       Follow-ups after your visit        Your next 10 appointments already scheduled     May 21, 2018 12:30 PM CDT   Lab with UC LAB   [facility] ([facility])    [address] [place] [address]  [address] [facility] [id number]   [phone]            May 21, 2018  1:00 PM CDT   (Arrive by 12:45 PM)   CORE RETURN with NELSON Smart CNP   [facility] ([facility])    [address] [place] [address]  [address] [place] [id number] [phone]            May 29, 2018 10:00 AM CDT   (Arrive by 9:45 AM)   RETURN ENDOCRINE with Dhara Meza MD   [facility] Endocrinology ([facility])    [address] [place] [address]  [address] [facility] [id number] [phone]            Jul 02, 2018 10:00 AM CDT   (Arrive by 9:45 AM)   New Patient Visit with Vinny Duran MD   [facility] Gastroenterology [facility] IBD [facility] ([facility])    [address] [place] [address]  [address] [facility] [id number] [phone]            Jul 05, 2018  1:30 PM CDT   (Arrive by 1:15 PM)   NEW ARRHYTHMIA with Butch Griffith MD   [facility] ([facility])    [address] [place] [address]  [address] [place] [id number] [phone]            Jul 26, 2018  2:20 PM CDT   (Arrive by 2:05 PM)   Return Visit with Stuart King MD   [facility] Prostate and Urologic Cancers ([facility])    [address] [place] [address]  [address] [facility] [id number] [phone]              Who to contact     If you  have questions or need follow up information about today's clinic visit or your schedule please contact [facility] directly at [phone].  Normal or non-critical lab and imaging results will be communicated to you by [employer]LET Systemshart, letter or phone within 4 business days after the clinic has received the results. If you do not hear from us within 7 days, please contact the clinic through [employer]art or phone. If you have a critical or abnormal lab result, we will notify you by phone as soon as possible.  Submit refill requests through [employer] or call your pharmacy and they will forward the refill request to us. Please allow 3 business days for your refill to be completed.          Additional Information About Your Visit        [employer]LET SystemsharKelway Information     [employer] gives you secure access to your electronic health record. If you see a primary care provider, you can also send messages to your care team and make appointments. If you have questions, please call your primary care clinic.  If you do not have a primary care provider, please call [phone] and they will assist you.        Care EveryWhere ID     This is your Care EveryWhere ID. This could be used by other organizations to access your [facility] medical records  [id number]        Your Vitals Were     Pulse Temperature Respirations Pulse Oximetry BMI (Body Mass Index)       54 98.3  F (36.8  C) (Oral) 16 97% 32.68 kg/m2        Blood Pressure from Last 3 Encounters:   04/30/18 134/58   04/03/18 130/59   03/29/18 116/66    Weight from Last 3 Encounters:   04/30/18 151 lb (68.5 kg)   04/03/18 146 lb 9.7 oz (66.5 kg)   03/29/18 148 lb 3.2 oz (67.2 kg)              Today, you had the following     No orders found for display         Today's Medication Changes          These changes are accurate as of 4/30/18 11:59 PM.  If you have any questions, ask your nurse or doctor.               These medicines have changed or have updated prescriptions.         Dose/Directions    irbesartan 300 MG tablet   Commonly known as:  AVAPRO   This may have changed:    - how much to take  - how to take this  - when to take this  - additional instructions   Used for:  Essential hypertension with goal blood pressure less than 140/90        TAKE 1 TABLET EVERY DAY   Quantity:  90 tablet   Refills:  2       methimazole 5 MG tablet   Commonly known as:  TAPAZOLE   This may have changed:  when to take this   Used for:  Nontoxic multinodular goiter        Dose:  5 mg   Take 1 tablet (5 mg) by mouth daily   Quantity:  90 tablet   Refills:  1       omeprazole 20 MG CR capsule   Commonly known as:  priLOSEC   This may have changed:  when to take this   Used for:  Gastroesophageal reflux disease without esophagitis        Dose:  20 mg   Take 1 capsule (20 mg) by mouth daily   Quantity:  180 capsule   Refills:  3       propranolol 60 MG 24 hr capsule   Commonly known as:  INDERAL LA   This may have changed:  when to take this   Used for:  Nontoxic multinodular goiter        Dose:  60 mg   Take 1 capsule (60 mg) by mouth daily   Quantity:  90 capsule   Refills:  2       sertraline 100 MG tablet   Commonly known as:  ZOLOFT   This may have changed:  when to take this   Used for:  THERON (generalized anxiety disorder)        Dose:  100 mg   Take 1 tablet (100 mg) by mouth daily   Quantity:  90 tablet   Refills:  1       traZODone 50 MG tablet   Commonly known as:  DESYREL   This may have changed:    - how much to take  - when to take this   Used for:  Insomnia, unspecified type        Dose:  50 mg   Take 1 tablet (50 mg) by mouth nightly as needed for sleep   Quantity:  30 tablet   Refills:  1                Primary Care Provider Office Phone # Fax #    Pop Antonino Zapien [phone] [address] [place] [address]        Equal Access to Services     GUNNER RODRIGUEZ AH: nadia Schneider qaybta kaalmada adeegyada, waxay idiin hayaan adeeg  "kelseamianannemarie kelloggYarelisele ah. [facility] [phone].    ATENCIÓN: Si soni alanis, tiene a sierra disposición servicios gratuitos de asistencia lingüística. Al al [phone].    We comply with applicable federal civil rights laws and [place] laws. We do not discriminate on the basis of race, color, national origin, age, disability, sex, sexual orientation, or gender identity.            Thank you!     Thank you for choosing [facility]  for your care. Our goal is always to provide you with excellent care. Hearing back from our patients is one way we can continue to improve our services. Please take a few minutes to complete the written survey that you may receive in the mail after your visit with us. Thank you!             Your Updated Medication List - Protect others around you: Learn how to safely use, store and throw away your medicines at www.disposemymeds.org.          This list is accurate as of 4/30/18 11:59 PM.  Always use your most recent med list.                   Brand Name Dispense Instructions for use Diagnosis    acetaminophen 650 MG 8 hour tablet     100 tablet    Take 650 mg by mouth every 8 hours as needed for mild pain or fever    Ascending cholangitis       alendronate 70 MG tablet    FOSAMAX    12 tablet    TAKE 1 TABLET EVERY 7 DAYS AT LEAST 60 MIN BEFORE BREAKFAST AS DIRECTED \"SEE PACKAGE FOR ADDITIONAL INSTRUCTIONS\"    High risk medication use, Osteopenia       ASPIRIN PO      Take 81 mg by mouth At Bedtime        CALCIUM + D PO      Take by mouth 2 times daily        CRANBERRY CONCENTRATE PO      Take 2 capsules by mouth At Bedtime        cycloSPORINE 0.05 % ophthalmic emulsion    RESTASIS     Place 1 drop into both eyes 2 times daily        diazepam 2 MG tablet    VALIUM    20 tablet    Take 1 tablet (2 mg) by mouth every 12 hours as needed for anxiety or sleep    THERON (generalized anxiety disorder)       Fish Oil 500 MG Caps      Take 1 capsule by mouth 2 times daily        irbesartan " 300 MG tablet    AVAPRO    90 tablet    TAKE 1 TABLET EVERY DAY    Essential hypertension with goal blood pressure less than 140/90       lovastatin 40 MG tablet    MEVACOR    90 tablet    TAKE 1 TABLET AT BEDTIME (HYPERLIPIDEMIA LDL GOAL  BELOW 130)    Hyperlipidemia LDL goal <130       Melatonin 10 MG Tabs tablet      Take 10 mg by mouth nightly as needed for sleep        METAMUCIL FIBER PO      Take by mouth 3 times daily        methimazole 5 MG tablet    TAPAZOLE    90 tablet    Take 1 tablet (5 mg) by mouth daily    Nontoxic multinodular goiter       nitroGLYcerin 0.4 MG sublingual tablet    NITROSTAT    25 tablet    For chest pain place 1 tablet under the tongue every 5 minutes for 3 doses. If symptoms persist 5 minutes after 1st dose call 911.    Elevated troponin       omeprazole 20 MG CR capsule    priLOSEC    180 capsule    Take 1 capsule (20 mg) by mouth daily    Gastroesophageal reflux disease without esophagitis       propranolol 60 MG 24 hr capsule    INDERAL LA    90 capsule    Take 1 capsule (60 mg) by mouth daily    Nontoxic multinodular goiter       sertraline 100 MG tablet    ZOLOFT    90 tablet    Take 1 tablet (100 mg) by mouth daily    THERON (generalized anxiety disorder)       SYSTANE 0.4-0.3 % Soln ophthalmic solution   Generic drug:  polyethylene glycol 0.4%- propylene glycol 0.3%      Place 1 drop into both eyes 3 times daily as needed for dry eyes        traZODone 50 MG tablet    DESYREL    30 tablet    Take 1 tablet (50 mg) by mouth nightly as needed for sleep    Insomnia, unspecified type

## 2018-05-08 ENCOUNTER — TELEPHONE (OUTPATIENT)
Dept: FAMILY MEDICINE | Facility: CLINIC | Age: 83
End: 2018-05-08

## 2018-05-08 DIAGNOSIS — G47.00 INSOMNIA, UNSPECIFIED TYPE: ICD-10-CM

## 2018-05-08 DIAGNOSIS — R31.9 HEMATURIA: Primary | ICD-10-CM

## 2018-05-08 NOTE — TELEPHONE ENCOUNTER
Blood in urine and has already seen urology.  Has blood daily but not every time she urinates.  Referral information given.  Raina Al RN

## 2018-05-08 NOTE — TELEPHONE ENCOUNTER
Reason for Call:  Other Referral     Detailed comments: Patient called and is requesting a referral for an OBGYN at the , if you have to leave a voice mail please speak clearly and slowly. Patient states she is hard of hearing.     Phone Number Patient can be reached at: Home number on file [phone] (home)    Best Time: Any     Can we leave a detailed message on this number? YES    Call taken on 5/8/2018 at 1:06 PM by Efren Morataya

## 2018-05-08 NOTE — TELEPHONE ENCOUNTER
Diagnosis? I am not sure for what reason she needs to see them.   Please select appropriate diagnosis and sign referral. Thanks

## 2018-05-08 NOTE — TELEPHONE ENCOUNTER
"Requested Prescriptions   Pending Prescriptions Disp Refills     traZODone (DESYREL) 50 MG tablet [Pharmacy Med Name: TRAZODONE 50MG TABLETS]  Last Written Prescription Date:  3/19/2018  Last Fill Quantity: 30 tablet,  # refills: 1   Last Office Visit: 4/30/2018   Future Office Visit:      90 tablet 1     Sig: TAKE 1 TABLET(50 MG) BY MOUTH EVERY NIGHT AS NEEDED FOR SLEEP    Serotonin Modulators Passed    5/8/2018 10:00 AM       Passed - Recent (12 mo) or future (30 days) visit within the authorizing provider's specialty    Patient had office visit in the last 12 months or has a visit in the next 30 days with authorizing provider or within the authorizing provider's specialty.  See \"Patient Info\" tab in inbasket, or \"Choose Columns\" in Meds & Orders section of the refill encounter.           Passed - Patient is age 18 or older       Passed - No active pregnancy on record       Passed - No positive pregnancy test in past 12 months          "

## 2018-05-09 ENCOUNTER — CARE COORDINATION (OUTPATIENT)
Dept: UROLOGY | Facility: CLINIC | Age: 83
End: 2018-05-09

## 2018-05-09 RX ORDER — TRAZODONE HYDROCHLORIDE 50 MG/1
TABLET, FILM COATED ORAL
Qty: 90 TABLET | Refills: 1 | Status: SHIPPED | OUTPATIENT
Start: 2018-05-09 | End: 2018-09-19

## 2018-05-09 NOTE — PROGRESS NOTES
Stuart King MD Tanner, Jessica, RN        Phone Number: [phone]                     Good thought.  If she hasn't had a work up, lets have her see gyn     [place]            Previous Messages       ----- Message -----      From: Kiera Figueroa RN      Sent: 4/26/2018   2:52 PM        To: Stuart King MD     Talked to her today, she is not having clots but still a lot of bleeding.  Do you think it could be coming from her uterus? Should she see Gyn?   ----- Message -----      From: Stuart King MD      Sent: 4/24/2018   7:32 AM        To: Kiera Figueroa RN     Take a lot of fluid.  Come in for clots that cause obstruction.  WIll plan to follow up in 3 months with cysto and cytology     [place]     ----- Message -----      From: Kiera Figueroa RN      Sent: 4/20/2018   3:30 PM        To: MD Dr. Christine Mccall,   This patient was in yesterday, never got to talk to you. She is still having a lot of blood and clots and is now wondering what she should do?                           Patient called and informed of Dr. King's response. She is scheduled to see Dr. Gomes on 5/22/18.

## 2018-05-09 NOTE — TELEPHONE ENCOUNTER
Prescription approved per [facility] Refill Protocol.    Signed Prescriptions:                        Disp   Refills    traZODone (DESYREL) 50 MG tablet           90 tab*1        Sig: TAKE 1 TABLET(50 MG) BY MOUTH EVERY NIGHT AS NEEDED           FOR SLEEP  Authorizing Provider: BLAYNE MORENO  Ordering User: RADHA BUSBY      Closing encounter - no further actions needed at this time    Radha Busby RN

## 2018-05-16 ASSESSMENT — ANXIETY QUESTIONNAIRES
4. TROUBLE RELAXING: MORE THAN HALF THE DAYS
GAD7 TOTAL SCORE: 6
1. FEELING NERVOUS, ANXIOUS, OR ON EDGE: NEARLY EVERY DAY
2. NOT BEING ABLE TO STOP OR CONTROL WORRYING: NOT AT ALL
5. BEING SO RESTLESS THAT IT IS HARD TO SIT STILL: SEVERAL DAYS
6. BECOMING EASILY ANNOYED OR IRRITABLE: NOT AT ALL
7. FEELING AFRAID AS IF SOMETHING AWFUL MIGHT HAPPEN: NOT AT ALL
GAD7 TOTAL SCORE: 6
3. WORRYING TOO MUCH ABOUT DIFFERENT THINGS: NOT AT ALL
7. FEELING AFRAID AS IF SOMETHING AWFUL MIGHT HAPPEN: NOT AT ALL

## 2018-05-17 ASSESSMENT — ANXIETY QUESTIONNAIRES: GAD7 TOTAL SCORE: 6

## 2018-05-18 ASSESSMENT — ENCOUNTER SYMPTOMS
ORTHOPNEA: 0
DECREASED APPETITE: 0
INCREASED ENERGY: 1
MEMORY LOSS: 0
HYPOTENSION: 1
EXERCISE INTOLERANCE: 1
BLOOD IN STOOL: 0
SPEECH CHANGE: 0
HALLUCINATIONS: 0
DECREASED CONCENTRATION: 0
WEIGHT GAIN: 0
PARALYSIS: 0
SHORTNESS OF BREATH: 1
DEPRESSION: 1
POOR WOUND HEALING: 0
WEIGHT LOSS: 0
HOT FLASHES: 1
TINGLING: 0
JAUNDICE: 0
EYE IRRITATION: 0
BLOOD IN STOOL: 0
SINUS CONGESTION: 0
BLOOD IN STOOL: 0
VOMITING: 0
VOMITING: 0
DISTURBANCES IN COORDINATION: 0
HEARTBURN: 0
EYE WATERING: 0
ALTERED TEMPERATURE REGULATION: 1
HOT FLASHES: 1
NECK PAIN: 1
SYNCOPE: 0
HEMOPTYSIS: 0
BOWEL INCONTINENCE: 1
NAUSEA: 1
DIFFICULTY URINATING: 1
TASTE DISTURBANCE: 1
TROUBLE SWALLOWING: 0
EYE REDNESS: 0
BLOATING: 0
DYSURIA: 0
RECTAL PAIN: 0
BACK PAIN: 1
NAIL CHANGES: 1
HEARTBURN: 0
POOR WOUND HEALING: 0
POSTURAL DYSPNEA: 0
BACK PAIN: 1
ARTHRALGIAS: 1
DIARRHEA: 1
FLANK PAIN: 0
WEIGHT GAIN: 0
MYALGIAS: 0
COUGH DISTURBING SLEEP: 0
SLEEP DISTURBANCES DUE TO BREATHING: 0
ORTHOPNEA: 0
NUMBNESS: 0
NAUSEA: 1
FLANK PAIN: 0
HOARSE VOICE: 1
TREMORS: 1
HYPOTENSION: 1
SORE THROAT: 0
HEADACHES: 0
DYSPNEA ON EXERTION: 1
LOSS OF CONSCIOUSNESS: 0
DYSURIA: 0
EYE PAIN: 0
FLANK PAIN: 0
SMELL DISTURBANCE: 1
HOT FLASHES: 1
DIZZINESS: 1
POLYPHAGIA: 1
NECK PAIN: 1
CONSTIPATION: 1
LIGHT-HEADEDNESS: 1
POSTURAL DYSPNEA: 0
COUGH DISTURBING SLEEP: 0
LEG PAIN: 0
BOWEL INCONTINENCE: 1
DOUBLE VISION: 0
WHEEZING: 0
NAIL CHANGES: 1
FATIGUE: 1
DECREASED CONCENTRATION: 0
NAUSEA: 1
NERVOUS/ANXIOUS: 1
SPEECH CHANGE: 0
WEIGHT LOSS: 0
SPUTUM PRODUCTION: 0
EYE IRRITATION: 0
NIGHT SWEATS: 0
RECTAL PAIN: 0
DOUBLE VISION: 0
HYPERTENSION: 1
COUGH: 0
SINUS CONGESTION: 0
MYALGIAS: 0
COUGH DISTURBING SLEEP: 0
WEAKNESS: 1
SLEEP DISTURBANCES DUE TO BREATHING: 0
DYSURIA: 0
DIARRHEA: 1
ORTHOPNEA: 0
HOARSE VOICE: 1
BOWEL INCONTINENCE: 1
SHORTNESS OF BREATH: 1
PANIC: 1
MUSCLE WEAKNESS: 0
ARTHRALGIAS: 1
NECK MASS: 0
POLYDIPSIA: 0
MEMORY LOSS: 0
HYPERTENSION: 1
POLYDIPSIA: 0
SMELL DISTURBANCE: 1
FEVER: 0
WHEEZING: 0
MUSCLE CRAMPS: 1
DECREASED LIBIDO: 1
SKIN CHANGES: 0
VOMITING: 0
CONSTIPATION: 1
NECK MASS: 0
DIFFICULTY URINATING: 1
SINUS PAIN: 0
HEMOPTYSIS: 0
TROUBLE SWALLOWING: 0
DOUBLE VISION: 0
ABDOMINAL PAIN: 0
SNORES LOUDLY: 0
JOINT SWELLING: 0
POSTURAL DYSPNEA: 0
HEADACHES: 0
INCREASED ENERGY: 1
BLOATING: 0
MUSCLE WEAKNESS: 0
DECREASED APPETITE: 0
DIFFICULTY URINATING: 1
FEVER: 0
DECREASED LIBIDO: 1
EYE WATERING: 0
EYE REDNESS: 0
TINGLING: 0
BACK PAIN: 1
TASTE DISTURBANCE: 1
DIZZINESS: 1
SPUTUM PRODUCTION: 0
LEG PAIN: 0
PALPITATIONS: 0
HYPOTENSION: 1
HEMATURIA: 1
ABDOMINAL PAIN: 0
FATIGUE: 1
MUSCLE CRAMPS: 1
HOARSE VOICE: 1
CONSTIPATION: 1
SNORES LOUDLY: 0
WHEEZING: 0
EYE WATERING: 0
HEMATURIA: 1
SYNCOPE: 0
SEIZURES: 0
NERVOUS/ANXIOUS: 1
NUMBNESS: 0
SINUS PAIN: 0
INSOMNIA: 1
ALTERED TEMPERATURE REGULATION: 1
ARTHRALGIAS: 1
FATIGUE: 1
INSOMNIA: 1
MEMORY LOSS: 0
DEPRESSION: 1
TREMORS: 1
HEMATURIA: 1
INCREASED ENERGY: 1
SHORTNESS OF BREATH: 1
WEAKNESS: 1
NIGHT SWEATS: 0
NECK PAIN: 1
NAIL CHANGES: 1
SPUTUM PRODUCTION: 0
EYE PAIN: 0
DECREASED CONCENTRATION: 0
TREMORS: 1
PARALYSIS: 0
DECREASED APPETITE: 0
DYSPNEA ON EXERTION: 1
ALTERED TEMPERATURE REGULATION: 1
HEMOPTYSIS: 0
SEIZURES: 0
POLYPHAGIA: 1
PALPITATIONS: 0
DISTURBANCES IN COORDINATION: 0
HEADACHES: 0
HALLUCINATIONS: 0
SMELL DISTURBANCE: 1
POLYDIPSIA: 0
DYSPNEA ON EXERTION: 1
DEPRESSION: 1
SPEECH CHANGE: 0
EYE PAIN: 0
STIFFNESS: 1
HYPERTENSION: 1
SINUS CONGESTION: 0
ABDOMINAL PAIN: 0
HALLUCINATIONS: 0
TROUBLE SWALLOWING: 0
NIGHT SWEATS: 0
CHILLS: 1
JOINT SWELLING: 0
SNORES LOUDLY: 0
TINGLING: 0
JAUNDICE: 0
STIFFNESS: 1
DECREASED LIBIDO: 1
SKIN CHANGES: 0
HEARTBURN: 0
RECTAL PAIN: 0
SORE THROAT: 0
COUGH: 0
NERVOUS/ANXIOUS: 1
MUSCLE CRAMPS: 1
SKIN CHANGES: 0
LIGHT-HEADEDNESS: 1
WEIGHT GAIN: 0
TASTE DISTURBANCE: 1
SEIZURES: 0
POLYPHAGIA: 1
SORE THROAT: 0
MYALGIAS: 0
INSOMNIA: 1
COUGH: 0
EYE REDNESS: 0
POOR WOUND HEALING: 0
WEIGHT LOSS: 0
CHILLS: 1
PANIC: 1
PANIC: 1
EXERCISE INTOLERANCE: 1
PARALYSIS: 0
LOSS OF CONSCIOUSNESS: 0
DIARRHEA: 1
CHILLS: 1
SYNCOPE: 0
MUSCLE WEAKNESS: 0
BLOATING: 0
EYE IRRITATION: 0
EXERCISE INTOLERANCE: 1
LEG PAIN: 0
NECK MASS: 0
JOINT SWELLING: 0
NUMBNESS: 0
JAUNDICE: 0
WEAKNESS: 1
LIGHT-HEADEDNESS: 1
SLEEP DISTURBANCES DUE TO BREATHING: 0
FEVER: 0
PALPITATIONS: 0
STIFFNESS: 1
SINUS PAIN: 0

## 2018-05-21 ENCOUNTER — OFFICE VISIT (OUTPATIENT)
Dept: CARDIOLOGY | Facility: CLINIC | Age: 83
End: 2018-05-21
Attending: NURSE PRACTITIONER
Payer: MEDICARE

## 2018-05-21 ENCOUNTER — PRE VISIT (OUTPATIENT)
Dept: CARDIOLOGY | Facility: CLINIC | Age: 83
End: 2018-05-21

## 2018-05-21 VITALS
HEIGHT: 57 IN | HEART RATE: 51 BPM | WEIGHT: 148.9 LBS | OXYGEN SATURATION: 97 % | DIASTOLIC BLOOD PRESSURE: 83 MMHG | SYSTOLIC BLOOD PRESSURE: 147 MMHG | BODY MASS INDEX: 32.12 KG/M2

## 2018-05-21 DIAGNOSIS — I50.22 CHRONIC SYSTOLIC HEART FAILURE (H): ICD-10-CM

## 2018-05-21 DIAGNOSIS — I49.9 IRREGULAR HEART RATE: ICD-10-CM

## 2018-05-21 DIAGNOSIS — I50.22 CHRONIC SYSTOLIC HEART FAILURE (H): Primary | ICD-10-CM

## 2018-05-21 DIAGNOSIS — R06.09 DYSPNEA ON EXERTION: Primary | ICD-10-CM

## 2018-05-21 LAB
ANION GAP SERPL CALCULATED.3IONS-SCNC: 5 MMOL/L (ref 3–14)
BUN SERPL-MCNC: 17 MG/DL (ref 7–30)
CALCIUM SERPL-MCNC: 9.2 MG/DL (ref 8.5–10.1)
CHLORIDE SERPL-SCNC: 100 MMOL/L (ref 94–109)
CO2 SERPL-SCNC: 28 MMOL/L (ref 20–32)
CREAT SERPL-MCNC: 0.85 MG/DL (ref 0.52–1.04)
GFR SERPL CREATININE-BSD FRML MDRD: 63 ML/MIN/1.7M2
GLUCOSE SERPL-MCNC: 112 MG/DL (ref 70–99)
NT-PROBNP SERPL-MCNC: 511 PG/ML (ref 0–450)
POTASSIUM SERPL-SCNC: 4.9 MMOL/L (ref 3.4–5.3)
SODIUM SERPL-SCNC: 133 MMOL/L (ref 133–144)

## 2018-05-21 PROCEDURE — 36415 COLL VENOUS BLD VENIPUNCTURE: CPT | Performed by: NURSE PRACTITIONER

## 2018-05-21 PROCEDURE — 93010 ELECTROCARDIOGRAM REPORT: CPT | Mod: ZP | Performed by: INTERNAL MEDICINE

## 2018-05-21 PROCEDURE — 83880 ASSAY OF NATRIURETIC PEPTIDE: CPT | Performed by: NURSE PRACTITIONER

## 2018-05-21 PROCEDURE — 99214 OFFICE O/P EST MOD 30 MIN: CPT | Mod: ZP | Performed by: NURSE PRACTITIONER

## 2018-05-21 PROCEDURE — 0298T ZZC EXT ECG > 48HR TO 21 DAY REVIEW AND INTERPRETATN: CPT | Performed by: INTERNAL MEDICINE

## 2018-05-21 PROCEDURE — G0463 HOSPITAL OUTPT CLINIC VISIT: HCPCS | Mod: ZF

## 2018-05-21 PROCEDURE — 80048 BASIC METABOLIC PNL TOTAL CA: CPT | Performed by: NURSE PRACTITIONER

## 2018-05-21 ASSESSMENT — PAIN SCALES - GENERAL: PAINLEVEL: NO PAIN (0)

## 2018-05-21 NOTE — PROGRESS NOTES
"HPI:   Ms. Oviedo is a 84 year old female with a past medical history including HTN, multinodular goiter, GERD, hyperthyroidism, stress cardiomyopathy (dx 12/2017) now with recovered LV systolic function, history of atrial fibrillation with RVR. Presents to clinic for CORE follow-up. She initially presented to [facility] 12/13 with persistent chest pain and shortness of breath, and was found to have an elevated troponin. EKG was negative for ischemic changes. Chest CT was negative for PE, but showed bilateral pleural effusions. Echocardiogram showed normal LV size with mild concentric hypertrophy, LVEF 30-35%, and global akinesis with sparing of the basal segments consistent with stress cardiomyopathy vs multivessel CAD. Coronary angiogram revealed no obstructive CAD.  Findings all suggested stress-induced cardiomyopathy, so she was discharged on BB, ARB, statin, and aspirin. She was then admitted overnight on 1/16 for palpitations and was found to be in afib with RVR in the setting of volume overload and ongoing hyperthyroidism (though free t4 was normal that admission). She converted to sinus spontaneously. She was given a one-time dose of xarelto and developed significant hematuria so this was d/c'ed. She is now s/p bladder mass bx which were negative for malignancy. An echo during that admission showed a normalized EF.     Since last CORE visit, patient reports feeling well. She continues to note daily bleeding that she thinks is urinary but also concerned could be vaginal. Notes dull uterine pain and mild underlying nausea \"similar to when (she) would get periods\". She is seeing OBGYN today. Complaints of some dizziness but attributes this to cataracts. No presyncope or syncope. She notes mild shortness of breath when exerting herself too much. She walks around apartment building and feels shortness of breath by third lap - this is unchanged. Denies LE edema, orthopnea, PND, chest pain with exertion, palpitations. " "Continues to endorse \"whole body tremor\" - worried about parkinsons. Weight is stable at home.     PAST MEDICAL HISTORY:  Past Medical History:   Diagnosis Date     Calculus of kidney      Esophageal reflux      GERD (gastroesophageal reflux disease)      Hyperlipidemia LDL goal <130 5/9/2010     Malignant melanoma of skin of trunk, except scrotum (H)      Nonspecific abnormal finding     has living will 2004 -      Nontoxic multinodular goiter     no further eval /tx rec per pt     Osteopenia      Other psoriasis      Personal history of colonic polyps      PMR (polymyalgia rheumatica) (H)      Stress-induced cardiomyopathy      Undiagnosed cardiac murmurs      Unspecified constipation      Unspecified essential hypertension        FAMILY HISTORY:  Family History   Problem Relation Age of Onset     CANCER Father      dec - esophageal and laryngeal     HEART DISEASE Mother      Respiratory Mother      dec       SOCIAL HISTORY:  Social History     Social History     Marital status:      Spouse name:      Number of children: 2     Years of education: N/A     Occupational History      Retired     Social History Main Topics     Smoking status: Never Smoker     Smokeless tobacco: Never Used     Alcohol use No      Comment: 6 times per year-one drink     Drug use: No     Sexual activity: Yes     Partners: Male     Other Topics Concern      Service No     Blood Transfusions No     Exercise Yes     curves     Seat Belt Yes     Self-Exams Yes     Parent/Sibling W/ Cabg, Mi Or Angioplasty Before 65f 55m? No     Social History Narrative    December 7, 2009    Balanced Diet - Yes    Osteoporosis Prevention Measures - Dairy servings per day: 2 and Medication/Supplements (See current meds)    Regular Exercise -  Yes Describe curves, walking    Dental Exam - YES - Date: 10/2009    Eye Exam - YES - Date: 2008    Self Breast Exam - Yes    Abuse: Current or Past (Physical, Sexual or Emotional)- No    Do you feel " "safe in your environment - Yes    Guns stored in the home - No    Sunscreen used - Yes    Seatbelts used - Yes    Lipids -  YES - Date: 11/24/2009    Glucose -  YES - Date: 11/24/2009    Colon Cancer Screening - Colonoscopy 11/18/2005(date completed)    Hemoccults - YES - Date: 10/12/2004    Pap Test -  YES - Date: 09/22/2004    Do you have any concerns about STD's -  No    Mammography - YES - Date: 11/24/2009    DEXA - YES - Date: 11/20/2008    Immunizations reviewed and up to date - Yes    PAULETTE Spencer CMA                   CURRENT MEDICATIONS:    Current Outpatient Prescriptions on File Prior to Visit:  acetaminophen 650 MG TABS Take 650 mg by mouth every 8 hours as needed for mild pain or fever   alendronate (FOSAMAX) 70 MG tablet TAKE 1 TABLET EVERY 7 DAYS AT LEAST 60 MINUTES BEFORE BREAKFAST AS DIRECTED \"SEE PACKAGE FOR ADDITIONAL INSTRUCTIONS\"   ASPIRIN PO Take 81 mg by mouth At Bedtime   Calcium Citrate-Vitamin D (CALCIUM + D PO) Take by mouth 2 times daily   CRANBERRY CONCENTRATE PO Take 2 capsules by mouth At Bedtime    cycloSPORINE (RESTASIS) 0.05 % ophthalmic emulsion Place 1 drop into both eyes 2 times daily   diazepam (VALIUM) 2 MG tablet Take 1 tablet (2 mg) by mouth every 12 hours as needed for anxiety or sleep   irbesartan (AVAPRO) 300 MG tablet TAKE 1 TABLET EVERY DAY (Patient taking differently: Take 300 mg by mouth every morning TAKE 1 TABLET EVERY DAY)   lovastatin (MEVACOR) 40 MG tablet TAKE 1 TABLET AT BEDTIME (HYPERLIPIDEMIA LDL GOAL  BELOW 130)   Melatonin 10 MG TABS tablet Take 10 mg by mouth nightly as needed for sleep   methimazole (TAPAZOLE) 5 MG tablet Take 1 tablet (5 mg) by mouth daily (Patient taking differently: Take 5 mg by mouth every morning )   nitroGLYcerin (NITROSTAT) 0.4 MG sublingual tablet For chest pain place 1 tablet under the tongue every 5 minutes for 3 doses. If symptoms persist 5 minutes after 1st dose call 911.   Omega-3 Fatty Acids (FISH OIL) 500 MG CAPS Take 1 " "capsule by mouth 2 times daily    omeprazole (PRILOSEC) 20 MG CR capsule Take 1 capsule (20 mg) by mouth daily (Patient taking differently: Take 20 mg by mouth five times a week )   polyethylene glycol 0.4%- propylene glycol 0.3% (SYSTANE) 0.4-0.3 % SOLN ophthalmic solution Place 1 drop into both eyes 3 times daily as needed for dry eyes   propranolol (INDERAL LA) 60 MG 24 hr capsule Take 1 capsule (60 mg) by mouth daily (Patient taking differently: Take 60 mg by mouth At Bedtime )   Psyllium (METAMUCIL FIBER PO) Take by mouth 3 times daily   sertraline (ZOLOFT) 100 MG tablet Take 1 tablet (100 mg) by mouth daily (Patient taking differently: Take 100 mg by mouth every morning )   traZODone (DESYREL) 50 MG tablet TAKE 1 TABLET(50 MG) BY MOUTH EVERY NIGHT AS NEEDED FOR SLEEP     No current facility-administered medications on file prior to visit.     ROS:   CONSTITUTIONAL: Denies fever, chills, fatigue, or weight fluctuations.   HEENT: Denies headache, vision changes, and changes in speech.   CV: Refer to HPI.   PULMONARY:Refer to HPI.   GI:Denies nausea, vomiting, diarrhea, and abdominal pain. Bowel movements are regular.   :Denies urinary alterations, dysuria, urinary frequency, hematuria, and abnormal drainage.   EXT:Denies lower extremity edema.   SKIN:Denies abnormal rashes or lesions.   MUSCULOSKELETAL:Denies upper or lower extremity weakness and pain.   NEUROLOGIC:Denies lightheadedness, dizziness, seizures, or upper or lower extremity paresthesia.     EXAM:  /83 (BP Location: Left arm, Patient Position: Chair, Cuff Size: Adult Regular)  Pulse 51  Ht 1.448 m (4' 9\")  Wt 67.5 kg (148 lb 14.4 oz)  SpO2 97%  BMI 32.22 kg/m2     GENERAL: Appears comfortable, in no acute distress.   HEENT: Eye symmetrical, no discharge or icterus bilaterally. Mucous membranes moist and without lesions.  CV: Regular with ectopy +S1S2, no appreciable murmur, rub, or gallop. JVP not visible at 75 degrees.   RESPIRATORY: " Respirations regular, even, and unlabored. Lungs CTA throughout.   GI: Soft and non distended with normoactive bowel sounds present in all quadrants. No tenderness, rebound, guarding. No hepatomegaly.   EXTREMITIES: Non pitting mild ankle edema. 2+ bilateral pedal pulses.   NEUROLOGIC: Alert and oriented x 3. No focal deficits.   MUSCULOSKELETAL: No joint swelling or tenderness.   SKIN: No jaundice. No rashes or lesions.     Labs, reviewed with patient in clinic today:  CBC RESULTS:  Lab Results   Component Value Date    WBC 7.7 03/15/2018    RBC 4.27 03/15/2018    HGB 12.9 03/29/2018    HCT 39.9 03/15/2018    MCV 93 03/15/2018    MCH 30.9 03/15/2018    MCHC 33.1 03/15/2018    RDW 15.0 03/15/2018     03/15/2018       CMP RESULTS:  Lab Results   Component Value Date     05/21/2018    POTASSIUM 4.9 05/21/2018    CHLORIDE 100 05/21/2018    CO2 28 05/21/2018    ANIONGAP 5 05/21/2018     (H) 05/21/2018    BUN 17 05/21/2018    CR 0.85 05/21/2018    GFRESTIMATED 63 05/21/2018    GFRESTBLACK 77 05/21/2018    SHREYA 9.2 05/21/2018    BILITOTAL 0.5 12/13/2017    ALBUMIN 3.0 (L) 12/13/2017    ALKPHOS 74 12/13/2017    ALT 85 (H) 12/13/2017    AST 76 (H) 12/13/2017        INR RESULTS:  Lab Results   Component Value Date    INR 1.71 (H) 01/16/2018       Lab Results   Component Value Date    MAG 2.1 01/16/2018     Lab Results   Component Value Date    NTBNPI 757 03/14/2018     Lab Results   Component Value Date    NTBNP 461 (H) 02/26/2018       Diagnostics:  TTE 3/14/18  Global and regional left ventricular function is normal with an EF of 60-65%.  No regional wall motion abnormalities are seen.  Right ventricular function, chamber size, wall motion, and thickness are  normal.  No pericardial effusion is present.    TTE 1/16/18  Left ventricular function is normal.The EF is > 65%.  The inferior vena cava was normal in size with preserved respiratory  variability.  No pericardial effusion is present.  The left  ventricular function has improved.    Coronary angiogram 12/13/17      TTE 12/13/17  ormal left ventricular size with mild concentric hypertrophy.  Global akinesis with sparing of the basal segments consistent with stress  cardiomyopathy versus multivessel coronary artery disease. Moderately reduced  systolic function. EF 30-35%.  Normal right ventricular size and function.  Severe left atrial enlargement.  No pericardial effusion.     Compared to the prior study 10/31/15 the wall motion abnormalities and LV  dysfunction are new.    Assessment and Plan:   Ms. Oviedo is a 84 year old female with history of stress induced cardiomyopathy and systolic heart failure now with recovered/normalized LV function and also history of hyperthyroidism on methimazole. She is euvolemic and clinically stable from a cardiac standpoint. She will continue prn lasix. She will continue ARB for now, and is on propranolol for hyperthyroidism (was never on guideline-recommended BB but EF has recovered). She did have one episode of afib in the setting of fluid overload, has only been on aspirin due to ongoing bleeding issues (hx of bladder mass s/p bx).     # History of systolic heart failure/HFrEF with recovered EF (65%) secondary to stress induced cardiomyopathy    Stage C. NYHA Class II.    Fluid status: euvolemic, continue low dose lasix only prn for weight gain or swelling  ACEi/ARB: Irbesartan 300 mg daily  BB: propanolol which she is taking for hyperthyroidism, consider increasing dose to help with tremors  Aldosterone antagonist: spironolactone d/c'ed secondary to hyponatremia, not indicated as EF normal  SCD prophylaxis: does not meet criteria for implant    # History of atrial fibrillation, paroxysmal, RVR at time of diagnosis  Very symptomatic at the time, should do holter monitor for any recurrence of palpitations as may need additional rate control (on propranolol now). Spontaneously converted to sinus in ED. ECG today shows SB  with PACs. Will do 7 day Holter to assess afib burden prior to Dr Griffith appt. [id number] score 4 - reviewed annual stroke risk with patient and her daughter. Also reviewed HAS BLED score with major bleeding risk. Elects for no warfarin/DOAC at this time. One time dose of Xarelto produced significant hematuria. She continues to note urinary vs vaginal bleeding - seeing OBGYN today. She is currently taking ASA 81 mg.     # Hyperthyroidism: Treated with methimazole and propanolol. Follows with endocrinology.    # HTN: At goal at home, taking Irbesartan and propanolol. Continue to monitor.     # Tremors: Encouraged patient to follow-up with neurology as recommend by PCP. Per endocrine, unrelated to her hyperthyroid. Could increase propranolol for symptomatic treatment.       Follow up with Dr. Griffith as scheduled            25 minutes spent face-to-face with patient, >50% in counseling and/or coordination of care as described above    Elizabeth Richards DNP, NP-C  5/21/2018            CC  INPATIENT, ATTENDING PHYSICIAN [facility]

## 2018-05-21 NOTE — MR AVS SNAPSHOT
"              After Visit Summary   2018    Dimple Oviedo    MRN: [id number]           Patient Information     Date Of Birth          1933        Visit Information        Provider Department      2018 1:00 PM Crista Richards APRN CNP [facility]        Today's Diagnoses     Dyspnea on exertion    -  1    Chronic systolic heart failure (H)        Irregular heart rate          Care Instructions    You were seen today in the Cardiovascular Clinic at the [facility].     Cardiology Providers you saw during your visit: Elizabeth DAMON CNP       1. EKG completed in clinic today.  2. Zio monitor applied for 1 week.   3.       Results for DIMPLE OVIEDO (MRN [id number]) as of 2018 12:45   Ref. Range 2018 12:10   Sodium Latest Ref Range: 133 - 144 mmol/L 133   Potassium Latest Ref Range: 3.4 - 5.3 mmol/L 4.9   Chloride Latest Ref Range: 94 - 109 mmol/L 100   Carbon Dioxide Latest Ref Range: 20 - 32 mmol/L 28   Urea Nitrogen Latest Ref Range: 7 - 30 mg/dL 17   Creatinine Latest Ref Range: 0.52 - 1.04 mg/dL 0.85   GFR Estimate Latest Ref Range: >60 mL/min/1.7m2 63   GFR Estimate If Black Latest Ref Range: >60 mL/min/1.7m2 77   Calcium Latest Ref Range: 8.5 - 10.1 mg/dL 9.2   Anion Gap Latest Ref Range: 3 - 14 mmol/L 5   Glucose Latest Ref Range: 70 - 99 mg/dL 112 (H)         Please limit your fluid intake to 2 L (64 ounces) daily.  2 Liters a day = 8.5 cups, or 72 ounces.  Please limit your salt intake to 2 grams a day or less.    If you gain 2# in 24 hours or 5# in one week call Kayli Olsen RN so we can adjust your medications as needed over the phone.    Please feel free to call me with any questions or concerns.      Kayli Olsen RN BSN   [facility]  Cardiology Care Coordinator-Heart Failure Clinic    Questions and [phone].   First press #1 for the [employer] and then press #3 for \"Medical Questions\" to reach us Cardiology Nurses. "     On Call Cardiologist for after hours or on weekends: [phone]   option #4 and ask to speak to the on-call Cardiologist. Inform them you are a CORE/heart failure patient at the [place].        If you need a medication refill please contact your pharmacy.  Please allow 3 business days for your refill to be completed.  _______________________________________________________  C.O.R.E. CLINIC Cardiomyopathy, Optimization, Rehabilitation, Education   The C.O.R.E. CLINIC is a heart failure specialty clinic within the [facility] where you will work with specialized nurse practitioners dedicated to helping patients with heart failure carefully adjust medications, receive education, and learn who and when to call if symptoms develop. They specialize in helping you better understand your condition, slow the progression of your disease, improve the length and quality of your life, help you detect future heart problems before they become life threatening, and avoid hospitalizations.  As always, thank you for trusting us with your health care needs!                Follow-ups after your visit        Your next 10 appointments already scheduled     May 22, 2018 11:00 AM CDT   New Patient Visit with Bibi Gomes MD   [facility] ([facility])    [facility] [address]  [address]rd [place],[facility] [address]  [address] [place] [id number] [phone]            May 25, 2018  2:40 PM CDT   Pre-Op physical with Pop Zapien MD   [facility] ([facility])    [address] [place] [id number] [phone]            May 29, 2018 11:00 AM CDT   (Arrive by 10:45 AM)   RETURN ENDOCRINE with Dhara Meza MD   [facility] ([facility])    [address] [id number] [phone]            Jul 05, 2018  1:30 PM CDT   (Arrive  "by 1:15 PM)   NEW ARRHYTHMIA with Butch Griffith MD   [facility] ([facility])    [address]  [address]  [facility] [id number] [phone]            Jul 26, 2018  2:20 PM CDT   (Arrive by 2:05 PM)   Return Visit with Stuart King MD   [facility] ([facility])    [address]  4th Floor  [facility] [id number] [phone]              Who to contact     If you have questions or need follow up information about today's clinic visit or your schedule please contact [facility] directly at [phone].  Normal or non-critical lab and imaging results will be communicated to you by [employer], letter or phone within 4 business days after the clinic has received the results. If you do not hear from us within 7 days, please contact the clinic through [employer] or phone. If you have a critical or abnormal lab result, we will notify you by phone as soon as possible.  Submit refill requests through [employer] or call your pharmacy and they will forward the refill request to us. Please allow 3 business days for your refill to be completed.          Additional Information About Your Visit        BTR Information     BTR gives you secure access to your electronic health record. If you see a primary care provider, you can also send messages to your care team and make appointments. If you have questions, please call your primary care clinic.  If you do not have a primary care provider, please call [phone] and they will assist you.        Care EveryWhere ID     This is your Care EveryWhere ID. This could be used by other organizations to access your [facility] medical records  [id number]        Your Vitals Were     Pulse Height Pulse Oximetry BMI (Body Mass Index)          51 1.448 m (4' 9\") 97% 32.22 kg/m2         Blood Pressure from Last 3 Encounters:   05/21/18 " 147/83   04/30/18 134/58   04/03/18 130/59    Weight from Last 3 Encounters:   05/21/18 67.5 kg (148 lb 14.4 oz)   04/30/18 68.5 kg (151 lb)   04/03/18 66.5 kg (146 lb 9.7 oz)              We Performed the Following     EKG 12-lead, tracing only (Same Day)     Follow-Up with [facility]          Today's Medication Changes          These changes are accurate as of 5/21/18  1:46 PM.  If you have any questions, ask your nurse or doctor.               These medicines have changed or have updated prescriptions.        Dose/Directions    irbesartan 300 MG tablet   Commonly known as:  AVAPRO   This may have changed:    - how much to take  - how to take this  - when to take this  - additional instructions   Used for:  Essential hypertension with goal blood pressure less than 140/90        TAKE 1 TABLET EVERY DAY   Quantity:  90 tablet   Refills:  2       methimazole 5 MG tablet   Commonly known as:  TAPAZOLE   This may have changed:  when to take this   Used for:  Nontoxic multinodular goiter        Dose:  5 mg   Take 1 tablet (5 mg) by mouth daily   Quantity:  90 tablet   Refills:  1       omeprazole 20 MG CR capsule   Commonly known as:  priLOSEC   This may have changed:  when to take this   Used for:  Gastroesophageal reflux disease without esophagitis        Dose:  20 mg   Take 1 capsule (20 mg) by mouth daily   Quantity:  180 capsule   Refills:  3       propranolol 60 MG 24 hr capsule   Commonly known as:  INDERAL LA   This may have changed:  when to take this   Used for:  Nontoxic multinodular goiter        Dose:  60 mg   Take 1 capsule (60 mg) by mouth daily   Quantity:  90 capsule   Refills:  2       sertraline 100 MG tablet   Commonly known as:  ZOLOFT   This may have changed:  when to take this   Used for:  THERON (generalized anxiety disorder)        Dose:  100 mg   Take 1 tablet (100 mg) by mouth daily   Quantity:  90 tablet   Refills:  1                Primary Care Provider Office Phone # Fax #    Pop Muñiz  "MD Curry [phone]       [address] [place] [id number]        Equal Access to Services     GUNNER RODRIGUEZ : Hadii aad ku hadtamiko Pollack, nadia miahcharmaine, henrique kaazeemda saira, maldonado bey. So [facility] [phone].    ATENCIÓN: Si habla español, tiene a sierra disposición servicios gratuitos de asistencia lingüística. Llame al [phone].    We comply with applicable federal civil rights laws and [place] laws. We do not discriminate on the basis of race, color, national origin, age, disability, sex, sexual orientation, or gender identity.            Thank you!     Thank you for choosing [facility]  for your care. Our goal is always to provide you with excellent care. Hearing back from our patients is one way we can continue to improve our services. Please take a few minutes to complete the written survey that you may receive in the mail after your visit with us. Thank you!             Your Updated Medication List - Protect others around you: Learn how to safely use, store and throw away your medicines at www.disposemymeds.org.          This list is accurate as of 5/21/18  1:46 PM.  Always use your most recent med list.                   Brand Name Dispense Instructions for use Diagnosis    acetaminophen 650 MG 8 hour tablet     100 tablet    Take 650 mg by mouth every 8 hours as needed for mild pain or fever    Ascending cholangitis       alendronate 70 MG tablet    FOSAMAX    12 tablet    TAKE 1 TABLET EVERY 7 DAYS AT LEAST 60 MINUTES BEFORE BREAKFAST AS DIRECTED \"SEE PACKAGE FOR ADDITIONAL INSTRUCTIONS\"    High risk medication use, Osteopenia       ASPIRIN PO      Take 81 mg by mouth At Bedtime        CALCIUM + D PO      Take by mouth 2 times daily        CRANBERRY CONCENTRATE PO      Take 2 capsules by mouth At Bedtime        cycloSPORINE 0.05 % ophthalmic emulsion    RESTASIS     Place 1 drop into both eyes 2 times daily        diazepam 2 MG " tablet    VALIUM    20 tablet    Take 1 tablet (2 mg) by mouth every 12 hours as needed for anxiety or sleep    THERON (generalized anxiety disorder)       Fish Oil 500 MG Caps      Take 1 capsule by mouth 2 times daily        irbesartan 300 MG tablet    AVAPRO    90 tablet    TAKE 1 TABLET EVERY DAY    Essential hypertension with goal blood pressure less than 140/90       lovastatin 40 MG tablet    MEVACOR    90 tablet    TAKE 1 TABLET AT BEDTIME (HYPERLIPIDEMIA LDL GOAL  BELOW 130)    Hyperlipidemia LDL goal <130       Melatonin 10 MG Tabs tablet      Take 10 mg by mouth nightly as needed for sleep        METAMUCIL FIBER PO      Take by mouth 3 times daily        methimazole 5 MG tablet    TAPAZOLE    90 tablet    Take 1 tablet (5 mg) by mouth daily    Nontoxic multinodular goiter       nitroGLYcerin 0.4 MG sublingual tablet    NITROSTAT    25 tablet    For chest pain place 1 tablet under the tongue every 5 minutes for 3 doses. If symptoms persist 5 minutes after 1st dose call 911.    Elevated troponin       omeprazole 20 MG CR capsule    priLOSEC    180 capsule    Take 1 capsule (20 mg) by mouth daily    Gastroesophageal reflux disease without esophagitis       propranolol 60 MG 24 hr capsule    INDERAL LA    90 capsule    Take 1 capsule (60 mg) by mouth daily    Nontoxic multinodular goiter       sertraline 100 MG tablet    ZOLOFT    90 tablet    Take 1 tablet (100 mg) by mouth daily    THERON (generalized anxiety disorder)       SYSTANE 0.4-0.3 % Soln ophthalmic solution   Generic drug:  polyethylene glycol 0.4%- propylene glycol 0.3%      Place 1 drop into both eyes 3 times daily as needed for dry eyes        traZODone 50 MG tablet    DESYREL    90 tablet    TAKE 1 TABLET(50 MG) BY MOUTH EVERY NIGHT AS NEEDED FOR SLEEP    Insomnia, unspecified type

## 2018-05-21 NOTE — PATIENT INSTRUCTIONS
"You were seen today in the Cardiovascular Clinic at the [facility].     Cardiology Providers you saw during your visit: Elizabeth DAMON CNP       1. EKG completed in clinic today.  2. Zio monitor applied for 1 week.       Results for QUIN VALDEZ (MRN [id number]) as of 2018 12:45   Ref. Range 2018 12:10   Sodium Latest Ref Range: 133 - 144 mmol/L 133   Potassium Latest Ref Range: 3.4 - 5.3 mmol/L 4.9   Chloride Latest Ref Range: 94 - 109 mmol/L 100   Carbon Dioxide Latest Ref Range: 20 - 32 mmol/L 28   Urea Nitrogen Latest Ref Range: 7 - 30 mg/dL 17   Creatinine Latest Ref Range: 0.52 - 1.04 mg/dL 0.85   GFR Estimate Latest Ref Range: >60 mL/min/1.7m2 63   GFR Estimate If Black Latest Ref Range: >60 mL/min/1.7m2 77   Calcium Latest Ref Range: 8.5 - 10.1 mg/dL 9.2   Anion Gap Latest Ref Range: 3 - 14 mmol/L 5   Glucose Latest Ref Range: 70 - 99 mg/dL 112 (H)         Please limit your fluid intake to 2 L (64 ounces) daily.  2 Liters a day = 8.5 cups, or 72 ounces.  Please limit your salt intake to 2 grams a day or less.    If you gain 2# in 24 hours or 5# in one week call Kayli Olsen RN so we can adjust your medications as needed over the phone.    Please feel free to call me with any questions or concerns.      Kayli Olsen RN BSN   [facility]  Cardiology Care Coordinator-Heart Failure Clinic    Questions and [phone].   First press #1 for the [employer] and then press #3 for \"Medical Questions\" to reach us Cardiology Nurses.     On Call Cardiologist for after hours or on weekends: [phone]   option #4 and ask to speak to the on-call Cardiologist. Inform them you are a CORE/heart failure patient at the [facility].        If you need a medication refill please contact your pharmacy.  Please allow 3 business days for your refill to be completed.  _______________________________________________________  C.O.R.E. CLINIC Cardiomyopathy, Optimization, " Rehabilitation, Education   The C.O.R.E. CLINIC is a heart failure specialty clinic within the [facility] where you will work with specialized nurse practitioners dedicated to helping patients with heart failure carefully adjust medications, receive education, and learn who and when to call if symptoms develop. They specialize in helping you better understand your condition, slow the progression of your disease, improve the length and quality of your life, help you detect future heart problems before they become life threatening, and avoid hospitalizations.  As always, thank you for trusting us with your health care needs!

## 2018-05-21 NOTE — LETTER
"5/21/2018      RE: Dimple Oviedo  [address]  [facility] [id number]       Dear Colleague,    Thank you for the opportunity to participate in the care of your patient, Dimple Oviedo, at the [facility] at [facility]. Please see a copy of my visit note below.    HPI:   Ms. Oviedo is a 84 year old female with a past medical history including HTN, multinodular goiter, GERD, hyperthyroidism, stress cardiomyopathy (dx 12/2017) now with recovered LV systolic function, history of atrial fibrillation with RVR. Presents to clinic for CORE follow-up. She initially presented to [facility] 12/13 with persistent chest pain and shortness of breath, and was found to have an elevated troponin. EKG was negative for ischemic changes. Chest CT was negative for PE, but showed bilateral pleural effusions. Echocardiogram showed normal LV size with mild concentric hypertrophy, LVEF 30-35%, and global akinesis with sparing of the basal segments consistent with stress cardiomyopathy vs multivessel CAD. Coronary angiogram revealed no obstructive CAD.  Findings all suggested stress-induced cardiomyopathy, so she was discharged on BB, ARB, statin, and aspirin. She was then admitted overnight on 1/16 for palpitations and was found to be in afib with RVR in the setting of volume overload and ongoing hyperthyroidism (though free t4 was normal that admission). She converted to sinus spontaneously. She was given a one-time dose of xarelto and developed significant hematuria so this was d/c'ed. She is now s/p bladder mass bx which were negative for malignancy. An echo during that admission showed a normalized EF.     Since last CORE visit, patient reports feeling well. She continues to note daily bleeding that she thinks is urinary but also concerned could be vaginal. Notes dull uterine pain and mild underlying nausea \"similar to when (she) would get periods\". She is seeing OBGYN today. " "Complaints of some dizziness but attributes this to cataracts. No presyncope or syncope. She notes mild shortness of breath when exerting herself too much. She walks around apartment building and feels shortness of breath by third lap - this is unchanged. Denies LE edema, orthopnea, PND, chest pain with exertion, palpitations. Continues to endorse \"whole body tremor\" - worried about parkinsons. Weight is stable at home.     PAST MEDICAL HISTORY:  Past Medical History:   Diagnosis Date     Calculus of kidney      Esophageal reflux      GERD (gastroesophageal reflux disease)      Hyperlipidemia LDL goal <130 5/9/2010     Malignant melanoma of skin of trunk, except scrotum (H)      Nonspecific abnormal finding     has living will 2004 -      Nontoxic multinodular goiter     no further eval /tx rec per pt     Osteopenia      Other psoriasis      Personal history of colonic polyps      PMR (polymyalgia rheumatica) (H)      Stress-induced cardiomyopathy      Undiagnosed cardiac murmurs      Unspecified constipation      Unspecified essential hypertension        FAMILY HISTORY:  Family History   Problem Relation Age of Onset     CANCER Father      dec - esophageal and laryngeal     HEART DISEASE Mother      Respiratory Mother      dec       SOCIAL HISTORY:  Social History     Social History     Marital status:      Spouse name:      Number of children: 2     Years of education: N/A     Occupational History      Retired     Social History Main Topics     Smoking status: Never Smoker     Smokeless tobacco: Never Used     Alcohol use No      Comment: 6 times per year-one drink     Drug use: No     Sexual activity: Yes     Partners: Male     Other Topics Concern      Service No     Blood Transfusions No     Exercise Yes     curves     Seat Belt Yes     Self-Exams Yes     Parent/Sibling W/ Cabg, Mi Or Angioplasty Before 65f 55m? No     Social History Narrative    December 7, 2009    Balanced Diet - Yes    " "Osteoporosis Prevention Measures - Dairy servings per day: 2 and Medication/Supplements (See current meds)    Regular Exercise -  Yes Describe curves, walking    Dental Exam - YES - Date: 10/2009    Eye Exam - YES - Date: 2008    Self Breast Exam - Yes    Abuse: Current or Past (Physical, Sexual or Emotional)- No    Do you feel safe in your environment - Yes    Guns stored in the home - No    Sunscreen used - Yes    Seatbelts used - Yes    Lipids -  YES - Date: 11/24/2009    Glucose -  YES - Date: 11/24/2009    Colon Cancer Screening - Colonoscopy 11/18/2005(date completed)    Hemoccults - YES - Date: 10/12/2004    Pap Test -  YES - Date: 09/22/2004    Do you have any concerns about STD's -  No    Mammography - YES - Date: 11/24/2009    DEXA - YES - Date: 11/20/2008    Immunizations reviewed and up to date - Yes    PAULETTE Spencer CMA                   CURRENT MEDICATIONS:    Current Outpatient Prescriptions on File Prior to Visit:  acetaminophen 650 MG TABS Take 650 mg by mouth every 8 hours as needed for mild pain or fever   alendronate (FOSAMAX) 70 MG tablet TAKE 1 TABLET EVERY 7 DAYS AT LEAST 60 MINUTES BEFORE BREAKFAST AS DIRECTED \"SEE PACKAGE FOR ADDITIONAL INSTRUCTIONS\"   ASPIRIN PO Take 81 mg by mouth At Bedtime   Calcium Citrate-Vitamin D (CALCIUM + D PO) Take by mouth 2 times daily   CRANBERRY CONCENTRATE PO Take 2 capsules by mouth At Bedtime    cycloSPORINE (RESTASIS) 0.05 % ophthalmic emulsion Place 1 drop into both eyes 2 times daily   diazepam (VALIUM) 2 MG tablet Take 1 tablet (2 mg) by mouth every 12 hours as needed for anxiety or sleep   irbesartan (AVAPRO) 300 MG tablet TAKE 1 TABLET EVERY DAY (Patient taking differently: Take 300 mg by mouth every morning TAKE 1 TABLET EVERY DAY)   lovastatin (MEVACOR) 40 MG tablet TAKE 1 TABLET AT BEDTIME (HYPERLIPIDEMIA LDL GOAL  BELOW 130)   Melatonin 10 MG TABS tablet Take 10 mg by mouth nightly as needed for sleep   methimazole (TAPAZOLE) 5 MG tablet Take 1 " "tablet (5 mg) by mouth daily (Patient taking differently: Take 5 mg by mouth every morning )   nitroGLYcerin (NITROSTAT) 0.4 MG sublingual tablet For chest pain place 1 tablet under the tongue every 5 minutes for 3 doses. If symptoms persist 5 minutes after 1st dose call 911.   Omega-3 Fatty Acids (FISH OIL) 500 MG CAPS Take 1 capsule by mouth 2 times daily    omeprazole (PRILOSEC) 20 MG CR capsule Take 1 capsule (20 mg) by mouth daily (Patient taking differently: Take 20 mg by mouth five times a week )   polyethylene glycol 0.4%- propylene glycol 0.3% (SYSTANE) 0.4-0.3 % SOLN ophthalmic solution Place 1 drop into both eyes 3 times daily as needed for dry eyes   propranolol (INDERAL LA) 60 MG 24 hr capsule Take 1 capsule (60 mg) by mouth daily (Patient taking differently: Take 60 mg by mouth At Bedtime )   Psyllium (METAMUCIL FIBER PO) Take by mouth 3 times daily   sertraline (ZOLOFT) 100 MG tablet Take 1 tablet (100 mg) by mouth daily (Patient taking differently: Take 100 mg by mouth every morning )   traZODone (DESYREL) 50 MG tablet TAKE 1 TABLET(50 MG) BY MOUTH EVERY NIGHT AS NEEDED FOR SLEEP     No current facility-administered medications on file prior to visit.     ROS:   CONSTITUTIONAL: Denies fever, chills, fatigue, or weight fluctuations.   HEENT: Denies headache, vision changes, and changes in speech.   CV: Refer to HPI.   PULMONARY:Refer to HPI.   GI:Denies nausea, vomiting, diarrhea, and abdominal pain. Bowel movements are regular.   :Denies urinary alterations, dysuria, urinary frequency, hematuria, and abnormal drainage.   EXT:Denies lower extremity edema.   SKIN:Denies abnormal rashes or lesions.   MUSCULOSKELETAL:Denies upper or lower extremity weakness and pain.   NEUROLOGIC:Denies lightheadedness, dizziness, seizures, or upper or lower extremity paresthesia.     EXAM:  /83 (BP Location: Left arm, Patient Position: Chair, Cuff Size: Adult Regular)  Pulse 51  Ht 1.448 m (4' 9\")  Wt 67.5 kg " (148 lb 14.4 oz)  SpO2 97%  BMI 32.22 kg/m2     GENERAL: Appears comfortable, in no acute distress.   HEENT: Eye symmetrical, no discharge or icterus bilaterally. Mucous membranes moist and without lesions.  CV: Regular with ectopy +S1S2, no appreciable murmur, rub, or gallop. JVP not visible at 75 degrees.   RESPIRATORY: Respirations regular, even, and unlabored. Lungs CTA throughout.   GI: Soft and non distended with normoactive bowel sounds present in all quadrants. No tenderness, rebound, guarding. No hepatomegaly.   EXTREMITIES: Non pitting mild ankle edema. 2+ bilateral pedal pulses.   NEUROLOGIC: Alert and oriented x 3. No focal deficits.   MUSCULOSKELETAL: No joint swelling or tenderness.   SKIN: No jaundice. No rashes or lesions.     Labs, reviewed with patient in clinic today:  CBC RESULTS:  Lab Results   Component Value Date    WBC 7.7 03/15/2018    RBC 4.27 03/15/2018    HGB 12.9 03/29/2018    HCT 39.9 03/15/2018    MCV 93 03/15/2018    MCH 30.9 03/15/2018    MCHC 33.1 03/15/2018    RDW 15.0 03/15/2018     03/15/2018       CMP RESULTS:  Lab Results   Component Value Date     05/21/2018    POTASSIUM 4.9 05/21/2018    CHLORIDE 100 05/21/2018    CO2 28 05/21/2018    ANIONGAP 5 05/21/2018     (H) 05/21/2018    BUN 17 05/21/2018    CR 0.85 05/21/2018    GFRESTIMATED 63 05/21/2018    GFRESTBLACK 77 05/21/2018    SHREYA 9.2 05/21/2018    BILITOTAL 0.5 12/13/2017    ALBUMIN 3.0 (L) 12/13/2017    ALKPHOS 74 12/13/2017    ALT 85 (H) 12/13/2017    AST 76 (H) 12/13/2017        INR RESULTS:  Lab Results   Component Value Date    INR 1.71 (H) 01/16/2018       Lab Results   Component Value Date    MAG 2.1 01/16/2018     Lab Results   Component Value Date    NTBNPI 757 03/14/2018     Lab Results   Component Value Date    NTBNP 461 (H) 02/26/2018       Diagnostics:  TTE 3/14/18  Global and regional left ventricular function is normal with an EF of 60-65%.  No regional wall motion abnormalities are  seen.  Right ventricular function, chamber size, wall motion, and thickness are  normal.  No pericardial effusion is present.    TTE 1/16/18  Left ventricular function is normal.The EF is > 65%.  The inferior vena cava was normal in size with preserved respiratory  variability.  No pericardial effusion is present.  The left ventricular function has improved.    Coronary angiogram 12/13/17      TTE 12/13/17  ormal left ventricular size with mild concentric hypertrophy.  Global akinesis with sparing of the basal segments consistent with stress  cardiomyopathy versus multivessel coronary artery disease. Moderately reduced  systolic function. EF 30-35%.  Normal right ventricular size and function.  Severe left atrial enlargement.  No pericardial effusion.     Compared to the prior study 10/31/15 the wall motion abnormalities and LV  dysfunction are new.    Assessment and Plan:   Ms. Oviedo is a 84 year old female with history of stress induced cardiomyopathy and systolic heart failure now with recovered/normalized LV function and also history of hyperthyroidism on methimazole. She is euvolemic and clinically stable from a cardiac standpoint. She will continue prn lasix. She will continue ARB for now, and is on propranolol for hyperthyroidism (was never on guideline-recommended BB but EF has recovered). She did have one episode of afib in the setting of fluid overload, has only been on aspirin due to ongoing bleeding issues (hx of bladder mass s/p bx).     # History of systolic heart failure/HFrEF with recovered EF (65%) secondary to stress induced cardiomyopathy    Stage C. NYHA Class II.    Fluid status:  euvolemic, continue low dose lasix only prn for weight gain or swelling  ACEi/ARB: Irbesartan 300 mg daily  BB: propanolol which she is taking for hyperthyroidism, consider increasing dose to help with tremors  Aldosterone antagonist: spironolactone d/c'ed secondary to hyponatremia, not indicated as EF normal  SCD  prophylaxis: does not meet criteria for implant    # History of atrial fibrillation, paroxysmal, RVR at time of diagnosis  Very symptomatic at the time, should do holter monitor for any recurrence of palpitations as may need additional rate control (on propranolol now). Spontaneously converted to sinus in ED. ECG today shows SB with PACs. Will do 7 day Holter to assess afib burden prior to Dr Griffith appt. [id number] score 4 - reviewed annual stroke risk with patient and her daughter. Also reviewed HAS BLED score with major bleeding risk. Elects for no warfarin/DOAC at this time. One time dose of Xarelto produced significant hematuria. She continues to note urinary vs vaginal bleeding - seeing OBGYN today. She is currently taking ASA 81 mg.     # Hyperthyroidism: Treated with methimazole and propanolol. Follows with endocrinology.    # HTN:  At goal at home, taking Irbesartan and propanolol. Continue to monitor.     # Tremors: Encouraged patient to follow-up with neurology as recommend by PCP. Per endocrine, unrelated to her hyperthyroid. Could increase propranolol for symptomatic treatment.       Follow up with Dr. Griffith as scheduled            25 minutes spent face-to-face with patient, >50% in counseling and/or coordination of care as described above    Elizabeth Richards DNP, NP-C  5/21/2018      CC  INPATIENT, ATTENDING PHYSICIAN [facility]

## 2018-05-21 NOTE — NURSING NOTE
Chief Complaint   Patient presents with     Follow Up For     New CORE; 84yr old female with new diagnosis of stress induced cardiomyopathy presenting post hospitalization for follow-up with labs prior     Vitals were taken and medications were reconciled.     JEAN CLAUDE Hogue  12:44 PM

## 2018-05-22 ENCOUNTER — OFFICE VISIT (OUTPATIENT)
Dept: OBGYN | Facility: CLINIC | Age: 83
End: 2018-05-22
Attending: OBSTETRICS & GYNECOLOGY
Payer: MEDICARE

## 2018-05-22 VITALS
HEIGHT: 57 IN | BODY MASS INDEX: 33.05 KG/M2 | HEART RATE: 48 BPM | SYSTOLIC BLOOD PRESSURE: 148 MMHG | DIASTOLIC BLOOD PRESSURE: 74 MMHG | WEIGHT: 153.2 LBS

## 2018-05-22 DIAGNOSIS — N95.0 POSTMENOPAUSAL BLEEDING: ICD-10-CM

## 2018-05-22 DIAGNOSIS — R31.9 HEMATURIA, UNSPECIFIED TYPE: Primary | ICD-10-CM

## 2018-05-22 PROCEDURE — G0463 HOSPITAL OUTPT CLINIC VISIT: HCPCS | Mod: ZF

## 2018-05-22 RX ORDER — MONTELUKAST SODIUM 4 MG/1
TABLET, CHEWABLE ORAL
Refills: 2 | Status: ON HOLD | COMMUNITY
Start: 2018-05-17 | End: 2018-09-13

## 2018-05-22 ASSESSMENT — ENCOUNTER SYMPTOMS
ORTHOPNEA: 0
DOUBLE VISION: 0
ARTHRALGIAS: 1
SNORES LOUDLY: 0
PALPITATIONS: 0
JAUNDICE: 0
POLYDIPSIA: 0
BLOOD IN STOOL: 0
SMELL DISTURBANCE: 1
CHILLS: 1
HYPERTENSION: 1
CONSTIPATION: 1
ABDOMINAL PAIN: 0
SPEECH CHANGE: 0
DECREASED APPETITE: 0
TREMORS: 1
MUSCLE WEAKNESS: 0
EYE WATERING: 0
HEMATURIA: 1
HEMOPTYSIS: 0
SORE THROAT: 0
FATIGUE: 1
BREAST MASS: 0
COUGH DISTURBING SLEEP: 0
POLYPHAGIA: 1
SINUS PAIN: 0
POSTURAL DYSPNEA: 0
NECK MASS: 0
DYSPNEA ON EXERTION: 1
DIARRHEA: 1
SHORTNESS OF BREATH: 1
LEG PAIN: 0
NERVOUS/ANXIOUS: 1
DECREASED LIBIDO: 1
TASTE DISTURBANCE: 1
WHEEZING: 0
POOR WOUND HEALING: 0
BACK PAIN: 1
LOSS OF CONSCIOUSNESS: 0
FEVER: 0
WEIGHT LOSS: 0
NAIL CHANGES: 1
BOWEL INCONTINENCE: 1
HOT FLASHES: 1
BREAST PAIN: 0
HEARTBURN: 0
SINUS CONGESTION: 0
FLANK PAIN: 0
BRUISES/BLEEDS EASILY: 0
NUMBNESS: 0
SLEEP DISTURBANCES DUE TO BREATHING: 0
PARALYSIS: 0
VOMITING: 0
COUGH: 0
SWOLLEN GLANDS: 0
RECTAL PAIN: 0
HOARSE VOICE: 1
EXERCISE INTOLERANCE: 1
WEIGHT GAIN: 0
NIGHT SWEATS: 0
SEIZURES: 0
DEPRESSION: 1
NECK PAIN: 1
MEMORY LOSS: 0
EYE PAIN: 0
MYALGIAS: 0
ALTERED TEMPERATURE REGULATION: 1
LIGHT-HEADEDNESS: 1
HALLUCINATIONS: 0
EYE IRRITATION: 0
HYPOTENSION: 1
INSOMNIA: 1
SKIN CHANGES: 0
DIZZINESS: 1
JOINT SWELLING: 0
TINGLING: 0
TROUBLE SWALLOWING: 0
STIFFNESS: 1
EYE REDNESS: 0
WEAKNESS: 1
HEADACHES: 0
MUSCLE CRAMPS: 1
BLOATING: 0
SPUTUM PRODUCTION: 0
DECREASED CONCENTRATION: 0
NAUSEA: 1
DIFFICULTY URINATING: 1
DYSURIA: 0
INCREASED ENERGY: 1
DISTURBANCES IN COORDINATION: 0
PANIC: 1
SYNCOPE: 0

## 2018-05-22 NOTE — PROGRESS NOTES
"Progress Note    SUBJECTIVE:  Dimple Oviedo is an 84 year old, , who is here for gross hematuria. She has been evaluated by urology with cystoscopy and CT scan.  She come in today to r/o a GYN cause.  She describes a few months of peeing blood with some clots. No blood in underwear.        HISTORY:  Prescription Medications as of 2018             acetaminophen 650 MG TABS Take 650 mg by mouth every 8 hours as needed for mild pain or fever    alendronate (FOSAMAX) 70 MG tablet TAKE 1 TABLET EVERY 7 DAYS AT LEAST 60 MINUTES BEFORE BREAKFAST AS DIRECTED \"SEE PACKAGE FOR ADDITIONAL INSTRUCTIONS\"    ASPIRIN PO Take 81 mg by mouth At Bedtime    Calcium Citrate-Vitamin D (CALCIUM + D PO) Take by mouth 2 times daily    colestipol (COLESTID) 1 g tablet TK 1 T PO BID - TAKE OTHER MEDS 1 HOUR BEFORE OR 4 HOURS AFTER COLESID    CRANBERRY CONCENTRATE PO Take 2 capsules by mouth At Bedtime     cycloSPORINE (RESTASIS) 0.05 % ophthalmic emulsion Place 1 drop into both eyes 2 times daily    diazepam (VALIUM) 2 MG tablet Take 1 tablet (2 mg) by mouth every 12 hours as needed for anxiety or sleep    irbesartan (AVAPRO) 300 MG tablet TAKE 1 TABLET EVERY DAY    lovastatin (MEVACOR) 40 MG tablet TAKE 1 TABLET AT BEDTIME (HYPERLIPIDEMIA LDL GOAL  BELOW 130)    Melatonin 10 MG TABS tablet Take 10 mg by mouth nightly as needed for sleep    methimazole (TAPAZOLE) 5 MG tablet Take 1 tablet (5 mg) by mouth daily    Omega-3 Fatty Acids (FISH OIL) 500 MG CAPS Take 1 capsule by mouth 2 times daily     omeprazole (PRILOSEC) 20 MG CR capsule Take 1 capsule (20 mg) by mouth daily    polyethylene glycol 0.4%- propylene glycol 0.3% (SYSTANE) 0.4-0.3 % SOLN ophthalmic solution Place 1 drop into both eyes 3 times daily as needed for dry eyes    propranolol (INDERAL LA) 60 MG 24 hr capsule Take 1 capsule (60 mg) by mouth daily    Psyllium (METAMUCIL FIBER PO) Take by mouth 3 times daily    sertraline (ZOLOFT) 100 MG tablet Take 1 tablet " (100 mg) by mouth daily    traZODone (DESYREL) 50 MG tablet TAKE 1 TABLET(50 MG) BY MOUTH EVERY NIGHT AS NEEDED FOR SLEEP    nitroGLYcerin (NITROSTAT) 0.4 MG sublingual tablet For chest pain place 1 tablet under the tongue every 5 minutes for 3 doses. If symptoms persist 5 minutes after 1st dose call 911.        Allergies   Allergen Reactions     No Known Drug Allergies      Immunization History   Administered Date(s) Administered     Influenza (High Dose) 3 valent vaccine 10/29/2010, 2011, 2014, 2017     Influenza (IIV3) PF 2002, 10/21/2004, 10/12/2005, 2007, 10/28/2008, 10/01/2015     Pneumo Conj 13-V (2010&after) 2014     Pneumococcal 23 valent 10/16/1999, 2009     TD (ADULT, 7+) 1998, 2008     Zoster vaccine, live 2009       Obstetric History       T4      L2     SAB0   TAB0   Ectopic0   Multiple0   Live Births0      Past Medical History:   Diagnosis Date     Calculus of kidney      Esophageal reflux      GERD (gastroesophageal reflux disease)      Hyperlipidemia LDL goal <130 2010     Malignant melanoma of skin of trunk, except scrotum (H)      Nonspecific abnormal finding     has living will  -      Nontoxic multinodular goiter     no further eval /tx rec per pt     Osteopenia      Other psoriasis      Personal history of colonic polyps      PMR (polymyalgia rheumatica) (H)      Stress-induced cardiomyopathy      Undiagnosed cardiac murmurs      Unspecified constipation      Unspecified essential hypertension      Past Surgical History:   Procedure Laterality Date     BIOPSY       C NONSPECIFIC PROCEDURE      colonoscopy polyp repeat      COLONOSCOPY       COMBINED CYSTOSCOPY, RETROGRADES, URETEROSCOPY, INSERT STENT Bilateral 4/3/2018    Procedure: COMBINED CYSTOSCOPY, RETROGRADES, URETEROSCOPY, INSERT STENT;;  Surgeon: Stuart King MD;  Location: [facility]     CYSTOSCOPY, BIOPSY BLADDER INSTILL OPTICAL AGENT  N/A 4/3/2018    Procedure: CYSTOSCOPY, BIOPSY BLADDER INSTILL OPTICAL AGENT;  Cystoscopy, Blue Light Cystoscopy, Bladder Biopsies, Bilateral Selective ureteral washings for Cytology, Bilateral Retrograde Pyelograms, Bilateral Ureteroscopy;  Surgeon: Stuart King MD;  [facility]     CYSTOSCOPY, BIOPSY BLADDER, COMBINED N/A 2/19/2018    Procedure: COMBINED CYSTOSCOPY, BIOPSY BLADDER;  Cystoscopy, Bladder Biopsy;  Surgeon: Kenna La MD;  Location: [facility]     ENDOSCOPIC ULTRASOUND LOWER GASTROINTESTIONAL TRACT (GI) N/A 10/30/2015    Procedure: ENDOSCOPIC ULTRASOUND LOWER GASTROINTESTIONAL TRACT (GI);  Surgeon: Daniel Jean-Baptiste MD;  Location: [facility]     EYE SURGERY  12/4/17     LAPAROSCOPIC CHOLECYSTECTOMY WITH CHOLANGIOGRAMS N/A 11/1/2015    Procedure: LAPAROSCOPIC CHOLECYSTECTOMY WITH CHOLANGIOGRAMS;  Surgeon: Tonie Warren MD;  [facility]     SURGICAL HISTORY OF -   1996    malignant melanoma     SURGICAL HISTORY OF -   1968    thyroid nodule     SURGICAL HISTORY OF -       D & C     Family History   Problem Relation Age of Onset     CANCER Father      dec - esophageal and laryngeal     HEART DISEASE Mother      Respiratory Mother      dec     Breast Cancer Daughter      Other Cancer Daughter      Thyroid Disease Daughter      Asthma Daughter      Hyperlipidemia Son      DIABETES Son      Social History     Social History     Marital status:      Spouse name:      Number of children: 2     Years of education: N/A     Occupational History      Retired     Social History Main Topics     Smoking status: Never Smoker     Smokeless tobacco: Never Used     Alcohol use No      Comment: 6 times per year-one drink     Drug use: No     Sexual activity: Yes     Partners: Male     Other Topics Concern      Service No     Blood Transfusions No     Exercise Yes     curves     Seat Belt Yes     Self-Exams Yes     Parent/Sibling W/ Cabg, Mi Or Angioplasty Before 65f  55m? No     Social History Narrative    December 7, 2009    Balanced Diet - Yes    Osteoporosis Prevention Measures - Dairy servings per day: 2 and Medication/Supplements (See current meds)    Regular Exercise -  Yes Describe curves, walking    Dental Exam - YES - Date: 10/2009    Eye Exam - YES - Date: 2008    Self Breast Exam - Yes    Abuse: Current or Past (Physical, Sexual or Emotional)- No    Do you feel safe in your environment - Yes    Guns stored in the home - No    Sunscreen used - Yes    Seatbelts used - Yes    Lipids -  YES - Date: 11/24/2009    Glucose -  YES - Date: 11/24/2009    Colon Cancer Screening - Colonoscopy 11/18/2005(date completed)    Hemoccults - YES - Date: 10/12/2004    Pap Test -  YES - Date: 09/22/2004    Do you have any concerns about STD's -  No    Mammography - YES - Date: 11/24/2009    DEXA - YES - Date: 11/20/2008    Immunizations reviewed and up to date - Yes    PAULETTE Spencer, [facility]                   Review of Systems     Constitutional:  Positive for chills, fatigue, recent stressors, height loss, increased energy and hyperactivity. Negative for fever, weight loss, weight gain, decreased appetite, night sweats, post-operative complications, incisional pain, hallucinations and confused.   HENT:  Positive for hearing loss, nosebleeds, hoarse voice, taste disturbance, smell disturbance, hearing aid and dry mouth. Negative for ear pain, tinnitus, trouble swallowing, mouth sores, sore throat, ear discharge, tooth pain, gum tenderness, bleeding gums, sinus pain, sinus congestion and neck mass.    Eyes:  Positive for decreased vision, eye dryness and floaters. Negative for double vision, pain, redness, eye pain, eye watering, eye bulging, flashing lights, spots, strabismus, tunnel vision, jaundice and eye irritation.   Respiratory:   Positive for shortness of breath and dyspnea on exertion. Negative for cough, hemoptysis, sputum production, wheezing, sleep disturbances due to breathing, snores  loudly, cough disturbing sleep and postural dyspnea.    Cardiovascular:  Positive for dyspnea on exertion, hypertension, hypotension, history of heart murmur, light-headedness and exercise intolerance. Negative for chest pain, palpitations, orthopnea, fingers/toes turn blue, syncope, pacemaker, few scattered varicosities, leg pain, sleep disturbances due to breathing and edema.   Gastrointestinal:  Positive for nausea, diarrhea, constipation, bowel incontinence and change in stool. Negative for heartburn, vomiting, abdominal pain, blood in stool, melena, rectal pain, bloating, jaundice and coffee ground emesis.   Genitourinary:  Positive for bladder incontinence, urgency, hematuria, difficulty urinating, decreased libido, nocturia, hot flashes and vaginal dryness. Negative for dysuria, flank pain, vaginal discharge, genital sores, dyspareunia, voiding less frequently, abnormal vaginal bleeding, excessive menstruation, menstrual changes and postmenopausal bleeding.   Musculoskeletal:  Positive for back pain, arthralgias, stiffness, muscle cramps and neck pain. Negative for myalgias, joint swelling, bone pain, muscle weakness and fracture.   Skin:  Positive for nail changes and hair changes. Negative for itching, poor wound healing, rash, skin changes, acne, warts, poor wound healing, scarring, flaky skin, Raynaud's phenomenon, sensitivity to sunlight and skin thickening.   Neurological:  Positive for dizziness, tremors, weakness, light-headedness and difficulty walking. Negative for tingling, speech change, seizures, loss of consciousness, numbness, headaches, disturbances in coordination, memory loss and paralysis.   Endo/Heme:  Negative for anemia, swollen glands and bruises/bleeds easily.   Psychiatric/Behavioral:  Positive for depression, mood swings and panic attacks. Negative for hallucinations, memory loss and decreased concentration.    Breast:  Negative for breast discharge, breast mass, breast pain and  "nipple retraction.   Endocrine:  Positive for altered temperature regulation and polyphagia.Negative for polydipsia, unwanted hair growth and change in facial hair.      EXAM:  Blood pressure 148/74, pulse (!) 48, height 1.448 m (4' 9\"), weight 69.5 kg (153 lb 3.2 oz), not currently breastfeeding. Body mass index is 33.15 kg/(m^2).  General - pleasant female in no acute distress.  Musculoskeletal - no gross deformities.  Neurological - normal strength, sensation, and mental status.    Pelvic Exam:  EG/BUS: Estrogen loss atrophy present. Normal genital architecture without lesions, erythema or abnormal secretions Bartholin's, Urethra, [place]'s normal   Urethral meatus: normal   Urethra: no masses, tenderness, or scarring   Bladder: no masses or tenderness   Vagina: atrophic, thin, dry with creamy, white, odorless and clear  secretions  Cervix: not visualized, due to pt discomfort. No blood seen in the vault  Uterus: not palpable  Adnexa: Not palpable  Rectum:anus normal       ASSESSMENT: gential bleeding  Encounter Diagnosis   Name Primary?     Postmenopausal bleeding Yes        PLAN:   Orders Placed This Encounter   Procedures     US GYN Pelvic, Complete Transab - [id number] (In Clinic)        Follow-up as needed.    Bibi Gomes MD      Answers for HPI/ROS submitted by the patient on 5/18/2018   THERON 7 TOTAL SCORE: 6  PHQ-2 Score: 0    Conflicting answers have been found for some questions. Please document the patient's answers manually. Pan positive ROS.  Noted significant anxiety.  "

## 2018-05-22 NOTE — NURSING NOTE
Chief Complaint   Patient presents with     Follow Up For     blood in urine since January     BP average 148/74

## 2018-05-22 NOTE — MR AVS SNAPSHOT
After Visit Summary   5/22/2018    Dimple Oviedo    MRN: [id number]           Patient Information     Date Of Birth          6/23/1933        Visit Information        Provider Department      5/22/2018 11:00 AM Bibi Gomes MD [facility]        Today's Diagnoses     Postmenopausal bleeding    -  1       Follow-ups after your visit        Your next 10 appointments already scheduled     May 25, 2018  2:40 PM CDT   Pre-Op physical with Pop Zapien MD   [facility] ([facility])    [address] [place] [id number]   [phone]            May 29, 2018 11:00 AM CDT   (Arrive by 10:45 AM)   RETURN ENDOCRINE with Dhara Meza MD   [facility] ([facility])    [address] [place] [address]  [address] [facility] [id number] [phone]            Jun 11, 2018 11:00 AM CDT   ULTRASOUND with [facility] ULTRASOUND   [facility] ([facility])    [facility]  [address]  [address]  [facility] [id number] [phone]            Jul 05, 2018  1:30 PM CDT   (Arrive by 1:15 PM)   NEW ARRHYTHMIA with Butch Griffith MD   [facility] ([facility])    [address] [place] [address]  [address] [place] [id number] [phone]            Jul 26, 2018  2:20 PM CDT   (Arrive by 2:05 PM)   Return Visit with Stuart King MD   [facility] ([facility])    [address] [place] [address]  [address] [facility] [id number] [phone]              Future tests that were ordered for you today     Open Future Orders        Priority Expected Expires Ordered    US GYN Pelvic, Complete Transab - [id number] (In Clinic) Routine  9/19/2018 5/22/2018            Who to contact     Please call your clinic at [phone] to:    Ask questions  "about your health    Make or cancel appointments    Discuss your medicines    Learn about your test results    Speak to your doctor            Additional Information About Your Visit        [employer] Information     [employer] gives you secure access to your electronic health record. If you see a primary care provider, you can also send messages to your care team and make appointments. If you have questions, please call your primary care clinic.  If you do not have a primary care provider, please call [phone] and they will assist you.      [employer] is an electronic gateway that provides easy, online access to your medical records. With [employer], you can request a clinic appointment, read your test results, renew a prescription or communicate with your care team.     To access your existing account, please contact your [facility] or call [phone] for assistance.        Care EveryWhere ID     This is your Care EveryWhere ID. This could be used by other organizations to access your [facility] medical records  [id number]        Your Vitals Were     Pulse Height BMI (Body Mass Index)             48 1.448 m (4' 9\") 33.15 kg/m2          Blood Pressure from Last 3 Encounters:   05/22/18 148/74   05/21/18 147/83   04/30/18 134/58    Weight from Last 3 Encounters:   05/22/18 69.5 kg (153 lb 3.2 oz)   05/21/18 67.5 kg (148 lb 14.4 oz)   04/30/18 68.5 kg (151 lb)                 Today's Medication Changes          These changes are accurate as of 5/22/18 11:39 AM.  If you have any questions, ask your nurse or doctor.               These medicines have changed or have updated prescriptions.        Dose/Directions    irbesartan 300 MG tablet   Commonly known as:  AVAPRO   This may have changed:    - how much to take  - how to take this  - when to take this  - additional instructions   Used for:  Essential hypertension with goal blood pressure less than 140/90        TAKE 1 TABLET EVERY DAY "   Quantity:  90 tablet   Refills:  2       methimazole 5 MG tablet   Commonly known as:  TAPAZOLE   This may have changed:  when to take this   Used for:  Nontoxic multinodular goiter        Dose:  5 mg   Take 1 tablet (5 mg) by mouth daily   Quantity:  90 tablet   Refills:  1       omeprazole 20 MG CR capsule   Commonly known as:  priLOSEC   This may have changed:  when to take this   Used for:  Gastroesophageal reflux disease without esophagitis        Dose:  20 mg   Take 1 capsule (20 mg) by mouth daily   Quantity:  180 capsule   Refills:  3       propranolol 60 MG 24 hr capsule   Commonly known as:  INDERAL LA   This may have changed:  when to take this   Used for:  Nontoxic multinodular goiter        Dose:  60 mg   Take 1 capsule (60 mg) by mouth daily   Quantity:  90 capsule   Refills:  2       sertraline 100 MG tablet   Commonly known as:  ZOLOFT   This may have changed:  when to take this   Used for:  THERON (generalized anxiety disorder)        Dose:  100 mg   Take 1 tablet (100 mg) by mouth daily   Quantity:  90 tablet   Refills:  1                Primary Care Provider Office Phone # Fax #    Pop Antonino Zapien [phone] [address] [place] [address]        Equal Access to Services     [facility]: Hadii shameka Pollack, waberylda lubabatunde, qaybta kaalmada saira, maldonado bey. So [facility] [phone].    ATENCIÓN: Si habla español, tiene a sierra disposición servicios gratuitos de asistencia lingüística. Llame al [phone].    We comply with applicable federal civil rights laws and [place] laws. We do not discriminate on the basis of race, color, national origin, age, disability, sex, sexual orientation, or gender identity.            Thank you!     Thank you for choosing [facility]  for your care. Our goal is always to provide you with excellent care. Hearing back from our patients is one way we can continue  "to improve our services. Please take a few minutes to complete the written survey that you may receive in the mail after your visit with us. Thank you!             Your Updated Medication List - Protect others around you: Learn how to safely use, store and throw away your medicines at www.disposemymeds.org.          This list is accurate as of 5/22/18 11:39 AM.  Always use your most recent med list.                   Brand Name Dispense Instructions for use Diagnosis    acetaminophen 650 MG 8 hour tablet     100 tablet    Take 650 mg by mouth every 8 hours as needed for mild pain or fever    Ascending cholangitis       alendronate 70 MG tablet    FOSAMAX    12 tablet    TAKE 1 TABLET EVERY 7 DAYS AT LEAST 60 MINUTES BEFORE BREAKFAST AS DIRECTED \"SEE PACKAGE FOR ADDITIONAL INSTRUCTIONS\"    High risk medication use, Osteopenia       ASPIRIN PO      Take 81 mg by mouth At Bedtime        CALCIUM + D PO      Take by mouth 2 times daily        colestipol 1 g tablet    COLESTID     TK 1 T PO BID - TAKE OTHER MEDS 1 HOUR BEFORE OR 4 HOURS AFTER COLESID        CRANBERRY CONCENTRATE PO      Take 2 capsules by mouth At Bedtime        cycloSPORINE 0.05 % ophthalmic emulsion    RESTASIS     Place 1 drop into both eyes 2 times daily        diazepam 2 MG tablet    VALIUM    20 tablet    Take 1 tablet (2 mg) by mouth every 12 hours as needed for anxiety or sleep    THERON (generalized anxiety disorder)       Fish Oil 500 MG Caps      Take 1 capsule by mouth 2 times daily        irbesartan 300 MG tablet    AVAPRO    90 tablet    TAKE 1 TABLET EVERY DAY    Essential hypertension with goal blood pressure less than 140/90       lovastatin 40 MG tablet    MEVACOR    90 tablet    TAKE 1 TABLET AT BEDTIME (HYPERLIPIDEMIA LDL GOAL  BELOW 130)    Hyperlipidemia LDL goal <130       Melatonin 10 MG Tabs tablet      Take 10 mg by mouth nightly as needed for sleep        METAMUCIL FIBER PO      Take by mouth 3 times daily        methimazole 5 MG " tablet    TAPAZOLE    90 tablet    Take 1 tablet (5 mg) by mouth daily    Nontoxic multinodular goiter       nitroGLYcerin 0.4 MG sublingual tablet    NITROSTAT    25 tablet    For chest pain place 1 tablet under the tongue every 5 minutes for 3 doses. If symptoms persist 5 minutes after 1st dose call 911.    Elevated troponin       omeprazole 20 MG CR capsule    priLOSEC    180 capsule    Take 1 capsule (20 mg) by mouth daily    Gastroesophageal reflux disease without esophagitis       propranolol 60 MG 24 hr capsule    INDERAL LA    90 capsule    Take 1 capsule (60 mg) by mouth daily    Nontoxic multinodular goiter       sertraline 100 MG tablet    ZOLOFT    90 tablet    Take 1 tablet (100 mg) by mouth daily    THERON (generalized anxiety disorder)       SYSTANE 0.4-0.3 % Soln ophthalmic solution   Generic drug:  polyethylene glycol 0.4%- propylene glycol 0.3%      Place 1 drop into both eyes 3 times daily as needed for dry eyes        traZODone 50 MG tablet    DESYREL    90 tablet    TAKE 1 TABLET(50 MG) BY MOUTH EVERY NIGHT AS NEEDED FOR SLEEP    Insomnia, unspecified type

## 2018-05-22 NOTE — LETTER
"2018       RE: Dimple Oviedo  [address]   [facility] [address]     Dear Colleague,    Thank you for referring your patient, Dimple Oviedo, to the [facility] at [facility]. Please see a copy of my visit note below.    Progress Note    SUBJECTIVE:  Dimple Oviedo is an 84 year old, , who is here for gross hematuria. She has been evaluated by urology with cystoscopy and CT scan.  She come in today to r/o a GYN cause.  She describes a few months of peeing blood with some clots. No blood in underwear.        HISTORY:  Prescription Medications as of 2018             acetaminophen 650 MG TABS Take 650 mg by mouth every 8 hours as needed for mild pain or fever    alendronate (FOSAMAX) 70 MG tablet TAKE 1 TABLET EVERY 7 DAYS AT LEAST 60 MINUTES BEFORE BREAKFAST AS DIRECTED \"SEE PACKAGE FOR ADDITIONAL INSTRUCTIONS\"    ASPIRIN PO Take 81 mg by mouth At Bedtime    Calcium Citrate-Vitamin D (CALCIUM + D PO) Take by mouth 2 times daily    colestipol (COLESTID) 1 g tablet TK 1 T PO BID - TAKE OTHER MEDS 1 HOUR BEFORE OR 4 HOURS AFTER COLESID    CRANBERRY CONCENTRATE PO Take 2 capsules by mouth At Bedtime     cycloSPORINE (RESTASIS) 0.05 % ophthalmic emulsion Place 1 drop into both eyes 2 times daily    diazepam (VALIUM) 2 MG tablet Take 1 tablet (2 mg) by mouth every 12 hours as needed for anxiety or sleep    irbesartan (AVAPRO) 300 MG tablet TAKE 1 TABLET EVERY DAY    lovastatin (MEVACOR) 40 MG tablet TAKE 1 TABLET AT BEDTIME (HYPERLIPIDEMIA LDL GOAL  BELOW 130)    Melatonin 10 MG TABS tablet Take 10 mg by mouth nightly as needed for sleep    methimazole (TAPAZOLE) 5 MG tablet Take 1 tablet (5 mg) by mouth daily    Omega-3 Fatty Acids (FISH OIL) 500 MG CAPS Take 1 capsule by mouth 2 times daily     omeprazole (PRILOSEC) 20 MG CR capsule Take 1 capsule (20 mg) by mouth daily    polyethylene glycol 0.4%- propylene glycol 0.3% " (SYSTANE) 0.4-0.3 % SOLN ophthalmic solution Place 1 drop into both eyes 3 times daily as needed for dry eyes    propranolol (INDERAL LA) 60 MG 24 hr capsule Take 1 capsule (60 mg) by mouth daily    Psyllium (METAMUCIL FIBER PO) Take by mouth 3 times daily    sertraline (ZOLOFT) 100 MG tablet Take 1 tablet (100 mg) by mouth daily    traZODone (DESYREL) 50 MG tablet TAKE 1 TABLET(50 MG) BY MOUTH EVERY NIGHT AS NEEDED FOR SLEEP    nitroGLYcerin (NITROSTAT) 0.4 MG sublingual tablet For chest pain place 1 tablet under the tongue every 5 minutes for 3 doses. If symptoms persist 5 minutes after 1st dose call 911.        Allergies   Allergen Reactions     No Known Drug Allergies      Immunization History   Administered Date(s) Administered     Influenza (High Dose) 3 valent vaccine 10/29/2010, 2011, 2014, 2017     Influenza (IIV3) PF 2002, 10/21/2004, 10/12/2005, 2007, 10/28/2008, 10/01/2015     Pneumo Conj 13-V (2010&after) 2014     Pneumococcal 23 valent 10/16/1999, 2009     TD (ADULT, 7+) 1998, 2008     Zoster vaccine, live 2009       Obstetric History       T4      L2     SAB0   TAB0   Ectopic0   Multiple0   Live Births0      Past Medical History:   Diagnosis Date     Calculus of kidney      Esophageal reflux      GERD (gastroesophageal reflux disease)      Hyperlipidemia LDL goal <130 2010     Malignant melanoma of skin of trunk, except scrotum (H)      Nonspecific abnormal finding     has living will  -      Nontoxic multinodular goiter     no further eval /tx rec per pt     Osteopenia      Other psoriasis      Personal history of colonic polyps      PMR (polymyalgia rheumatica) (H)      Stress-induced cardiomyopathy      Undiagnosed cardiac murmurs      Unspecified constipation      Unspecified essential hypertension      Past Surgical History:   Procedure Laterality Date     BIOPSY       C NONSPECIFIC PROCEDURE      colonoscopy  polyp repeat 2010     COLONOSCOPY  2014     COMBINED CYSTOSCOPY, RETROGRADES, URETEROSCOPY, INSERT STENT Bilateral 4/3/2018    Procedure: COMBINED CYSTOSCOPY, RETROGRADES, URETEROSCOPY, INSERT STENT;;  Surgeon: Stuart King MD;  Location: [facility] OR     CYSTOSCOPY, BIOPSY BLADDER INSTILL OPTICAL AGENT N/A 4/3/2018    Procedure: CYSTOSCOPY, BIOPSY BLADDER INSTILL OPTICAL AGENT;  Cystoscopy, Blue Light Cystoscopy, Bladder Biopsies, Bilateral Selective ureteral washings for Cytology, Bilateral Retrograde Pyelograms, Bilateral Ureteroscopy;  Surgeon: Stuart King MD;  Location: [facility] OR     CYSTOSCOPY, BIOPSY BLADDER, COMBINED N/A 2/19/2018    Procedure: COMBINED CYSTOSCOPY, BIOPSY BLADDER;  Cystoscopy, Bladder Biopsy;  Surgeon: Kenna La MD;  Location: [facility] OR     ENDOSCOPIC ULTRASOUND LOWER GASTROINTESTIONAL TRACT (GI) N/A 10/30/2015    Procedure: ENDOSCOPIC ULTRASOUND LOWER GASTROINTESTIONAL TRACT (GI);  Surgeon: Daniel Jean-Baptiste MD;  Location: [facility] OR     EYE SURGERY  12/4/17     LAPAROSCOPIC CHOLECYSTECTOMY WITH CHOLANGIOGRAMS N/A 11/1/2015    Procedure: LAPAROSCOPIC CHOLECYSTECTOMY WITH CHOLANGIOGRAMS;  Surgeon: Tonie Warren MD;  Location: [facility] OR     SURGICAL HISTORY OF -   1996    malignant melanoma     SURGICAL HISTORY OF -   1968    thyroid nodule     SURGICAL HISTORY OF -       D & C     Family History   Problem Relation Age of Onset     CANCER Father      dec - esophageal and laryngeal     HEART DISEASE Mother      Respiratory Mother      dec     Breast Cancer Daughter      Other Cancer Daughter      Thyroid Disease Daughter      Asthma Daughter      Hyperlipidemia Son      DIABETES Son      Social History     Social History     Marital status:      Spouse name:      Number of children: 2     Years of education: N/A     Occupational History      Retired     Social History Main Topics     Smoking status: Never Smoker     Smokeless tobacco: Never Used      Alcohol use No      Comment: 6 times per year-one drink     Drug use: No     Sexual activity: Yes     Partners: Male     Other Topics Concern      Service No     Blood Transfusions No     Exercise Yes     curves     Seat Belt Yes     Self-Exams Yes     Parent/Sibling W/ Cabg, Mi Or Angioplasty Before 65f 55m? No     Social History Narrative    December 7, 2009    Balanced Diet - Yes    Osteoporosis Prevention Measures - Dairy servings per day: 2 and Medication/Supplements (See current meds)    Regular Exercise -  Yes Describe curves, walking    Dental Exam - YES - Date: 10/2009    Eye Exam - YES - Date: 2008    Self Breast Exam - Yes    Abuse: Current or Past (Physical, Sexual or Emotional)- No    Do you feel safe in your environment - Yes    Guns stored in the home - No    Sunscreen used - Yes    Seatbelts used - Yes    Lipids -  YES - Date: 11/24/2009    Glucose -  YES - Date: 11/24/2009    Colon Cancer Screening - Colonoscopy 11/18/2005(date completed)    Hemoccults - YES - Date: 10/12/2004    Pap Test -  YES - Date: 09/22/2004    Do you have any concerns about STD's -  No    Mammography - YES - Date: 11/24/2009    DEXA - YES - Date: 11/20/2008    Immunizations reviewed and up to date - Yes    PAULETTE Spencer, [facility]                   Review of Systems     Constitutional:  Positive for chills, fatigue, recent stressors, height loss, increased energy and hyperactivity. Negative for fever, weight loss, weight gain, decreased appetite, night sweats, post-operative complications, incisional pain, hallucinations and confused.   HENT:  Positive for hearing loss, nosebleeds, hoarse voice, taste disturbance, smell disturbance, hearing aid and dry mouth. Negative for ear pain, tinnitus, trouble swallowing, mouth sores, sore throat, ear discharge, tooth pain, gum tenderness, bleeding gums, sinus pain, sinus congestion and neck mass.    Eyes:  Positive for decreased vision, eye dryness and floaters. Negative for double  vision, pain, redness, eye pain, eye watering, eye bulging, flashing lights, spots, strabismus, tunnel vision, jaundice and eye irritation.   Respiratory:   Positive for shortness of breath and dyspnea on exertion. Negative for cough, hemoptysis, sputum production, wheezing, sleep disturbances due to breathing, snores loudly, cough disturbing sleep and postural dyspnea.    Cardiovascular:  Positive for dyspnea on exertion, hypertension, hypotension, history of heart murmur, light-headedness and exercise intolerance. Negative for chest pain, palpitations, orthopnea, fingers/toes turn blue, syncope, pacemaker, few scattered varicosities, leg pain, sleep disturbances due to breathing and edema.   Gastrointestinal:  Positive for nausea, diarrhea, constipation, bowel incontinence and change in stool. Negative for heartburn, vomiting, abdominal pain, blood in stool, melena, rectal pain, bloating, jaundice and coffee ground emesis.   Genitourinary:  Positive for bladder incontinence, urgency, hematuria, difficulty urinating, decreased libido, nocturia, hot flashes and vaginal dryness. Negative for dysuria, flank pain, vaginal discharge, genital sores, dyspareunia, voiding less frequently, abnormal vaginal bleeding, excessive menstruation, menstrual changes and postmenopausal bleeding.   Musculoskeletal:  Positive for back pain, arthralgias, stiffness, muscle cramps and neck pain. Negative for myalgias, joint swelling, bone pain, muscle weakness and fracture.   Skin:  Positive for nail changes and hair changes. Negative for itching, poor wound healing, rash, skin changes, acne, warts, poor wound healing, scarring, flaky skin, Raynaud's phenomenon, sensitivity to sunlight and skin thickening.   Neurological:  Positive for dizziness, tremors, weakness, light-headedness and difficulty walking. Negative for tingling, speech change, seizures, loss of consciousness, numbness, headaches, disturbances in coordination, memory loss  "and paralysis.   Endo/Heme:  Negative for anemia, swollen glands and bruises/bleeds easily.   Psychiatric/Behavioral:  Positive for depression, mood swings and panic attacks. Negative for hallucinations, memory loss and decreased concentration.    Breast:  Negative for breast discharge, breast mass, breast pain and nipple retraction.   Endocrine:  Positive for altered temperature regulation and polyphagia.Negative for polydipsia, unwanted hair growth and change in facial hair.      EXAM:  Blood pressure 148/74, pulse (!) 48, height 1.448 m (4' 9\"), weight 69.5 kg (153 lb 3.2 oz), not currently breastfeeding. Body mass index is 33.15 kg/(m^2).  General - pleasant female in no acute distress.  Musculoskeletal - no gross deformities.  Neurological - normal strength, sensation, and mental status.    Pelvic Exam:  EG/BUS: Estrogen loss atrophy present. Normal genital architecture without lesions, erythema or abnormal secretions Bartholin's, Urethra, Meggett's normal   Urethral meatus: normal   Urethra: no masses, tenderness, or scarring   Bladder: no masses or tenderness   Vagina: atrophic, thin, dry with creamy, white, odorless and clear  secretions  Cervix: not visualized, due to pt discomfort. No blood seen in the vault  Uterus: not palpable  Adnexa: Not palpable  Rectum:anus normal       ASSESSMENT: gential bleeding  Encounter Diagnosis   Name Primary?     Postmenopausal bleeding Yes        PLAN:   Orders Placed This Encounter   Procedures     US GYN Pelvic, Complete Transab - 34148 (In Clinic)        Follow-up as needed.    Bibi Gomes MD      Answers for HPI/ROS submitted by the patient on 5/18/2018   THERON 7 TOTAL SCORE: 6  PHQ-2 Score: 0    Conflicting answers have been found for some questions. Please document the patient's answers manually. Pan positive ROS.  Noted significant anxiety.    Again, thank you for allowing me to participate in the care of your patient.      Sincerely,    Bibi Denney " MD M[place]

## 2018-05-23 LAB — INTERPRETATION ECG - MUSE: NORMAL

## 2018-05-23 NOTE — NURSING NOTE
Per Dr. Richards, patient to have Ziopatch 7 day monitor placed.  Diagnosis: Tachycardia   Monitor placed: Yes  Patient Instructed: Yes  Patient verbalized understanding: Yes  Holter # [id number]    Placed By Ileana SILVERIO

## 2018-05-25 ENCOUNTER — OFFICE VISIT (OUTPATIENT)
Dept: FAMILY MEDICINE | Facility: CLINIC | Age: 83
End: 2018-05-25
Payer: MEDICARE

## 2018-05-25 VITALS
OXYGEN SATURATION: 97 % | HEART RATE: 54 BPM | DIASTOLIC BLOOD PRESSURE: 53 MMHG | TEMPERATURE: 98.4 F | SYSTOLIC BLOOD PRESSURE: 126 MMHG | RESPIRATION RATE: 20 BRPM | WEIGHT: 154.25 LBS | HEIGHT: 57 IN | BODY MASS INDEX: 33.28 KG/M2

## 2018-05-25 DIAGNOSIS — Z01.818 PREOP GENERAL PHYSICAL EXAM: Primary | ICD-10-CM

## 2018-05-25 DIAGNOSIS — I50.22 CHRONIC SYSTOLIC HEART FAILURE (H): ICD-10-CM

## 2018-05-25 DIAGNOSIS — I48.91 ATRIAL FIBRILLATION, UNSPECIFIED TYPE (H): ICD-10-CM

## 2018-05-25 DIAGNOSIS — H26.9 CATARACT OF LEFT EYE, UNSPECIFIED CATARACT TYPE: ICD-10-CM

## 2018-05-25 PROCEDURE — 99214 OFFICE O/P EST MOD 30 MIN: CPT | Performed by: FAMILY MEDICINE

## 2018-05-25 NOTE — PROGRESS NOTES
[place] [id number] [place] [address] [phone]  Dept: [phone]    PRE-OP EVALUATION:  Today's date: 2018    Dimple Oviedo (: 1933) presents for pre-operative evaluation assessment as requested by Dr. Farzad Brothers.  She requires evaluation and anesthesia risk assessment prior to undergoing surgery/procedure for treatment of left eye cataracts surgery  .    Fax number for surgical facility: [phone]  Primary Physician: Pop Zapien  Type of Anesthesia Anticipated: to be determined    Patient has a Health Care Directive or Living Will:  YES scanned     Preop Questions 2018   Who is doing your surgery? leelee   What are you having done? ataract   Date of Surgery/Procedure: 18   Facility or Hospital where procedure/surgery will be performed: [facility]   1.  Do you have a history of Heart attack, stroke, stent, coronary bypass surgery, or other heart surgery? No   2.  Do you ever have any pain or discomfort in your chest? No   3.  Do you have a history of  Heart Failure? Uyes   4.   Are you troubled by shortness of breath when:  walking on a level surface, or up a slight hill, or at night? YES -     5.  Do you currently have a cold, bronchitis or other respiratory infection? No   6.  Do you have a cough, shortness of breath, or wheezing? YES -     7.  Do you sometimes get pains in the calves of your legs when you walk? No   8. Do you or anyone in your family have previous history of blood clots? No   9.  Do you or does anyone in your family have a serious bleeding problem such as prolonged bleeding following surgeries or cuts? No   10. Have you ever had problems with anemia or been told to take iron pills? YES -     11. Have you had any abnormal blood loss such as black, tarry or bloody stools, or abnormal vaginal bleeding? No   12. Have you ever had a blood transfusion? No   13. Have you or any of your relatives ever had  problems with anesthesia? No   14. Do you have sleep apnea, excessive snoring or daytime drowsiness? No   15. Do you have any prosthetic heart valves? No   16. Do you have prosthetic joints? No   17. Is there any chance that you may be pregnant? No     HPI:     HPI related to upcoming procedure: left eye cataract.      Been to urologist for hematuria. Been to obgyn.   Overall feeling stable.   Gained weight and going to start lasix as per cardiology standing order.   Missed appt with neurologist.     MEDICAL HISTORY:     Patient Active Problem List    Diagnosis Date Noted     Urothelial carcinoma (H) 03/22/2018     Priority: Medium     Chronic systolic heart failure (H) 02/20/2018     Priority: Medium     Anxiety 02/07/2018     Priority: Medium     Atrial fibrillation, unspecified type (H) 01/25/2018     Priority: Medium     A-fib (H) 01/16/2018     Priority: Medium     Stress-induced cardiomyopathy 12/14/2017     Priority: Medium     NSTEMI (non-ST elevated myocardial infarction) (H) 12/13/2017     Priority: Medium     Iron deficiency anemia, unspecified iron deficiency anemia type 11/30/2017     Priority: Medium     Obesity, unspecified obesity severity, unspecified obesity type 02/22/2017     Priority: Medium     Chronic bilateral low back pain without sciatica 12/05/2016     Priority: Medium     Impaired fasting blood sugar 11/24/2015     Priority: Medium     Sepsis (H) 10/30/2015     Priority: Medium     Shoulder pain 10/31/2014     Priority: Medium     High risk medication use 10/15/2014     Priority: Medium     Polymyalgia rheumatica (H) 07/10/2014     Priority: Medium     Hip arthritis 06/23/2014     Priority: Medium     Hypertension goal BP (blood pressure) < 140/90 11/30/2011     Priority: Medium     Urinary incontinence 11/30/2011     Priority: Medium     Osteoarthritis of left knee 11/30/2011     Priority: Medium     Hyperlipidemia LDL goal <130 05/09/2010     Priority: Medium     Nontoxic multinodular  goiter      Priority: Medium     Malignant melanoma of skin of trunk, except scrotum (H)      Priority: Medium     Undiagnosed cardiac murmurs      Priority: Medium     perirectal cyst 12/16/2005     Priority: Medium     restless leg 10/12/2005     Priority: Medium     heat intolerance 10/12/2005     Priority: Medium     Goiter 10/12/2005     Priority: Medium     Problem list name updated by automated process. Provider to review       Disorder of bone and cartilage 10/12/2005     Priority: Medium     Problem list name updated by automated process. Provider to review       Other psoriasis 10/12/2005     Priority: Medium     Esophageal reflux 09/22/2004     Priority: Medium      Past Medical History:   Diagnosis Date     Calculus of kidney      Esophageal reflux      GERD (gastroesophageal reflux disease)      Hyperlipidemia LDL goal <130 5/9/2010     Malignant melanoma of skin of trunk, except scrotum (H)      Nonspecific abnormal finding     has living will 2004 -      Nontoxic multinodular goiter     no further eval /tx rec per pt     Osteopenia      Other psoriasis      Personal history of colonic polyps      PMR (polymyalgia rheumatica) (H)      Stress-induced cardiomyopathy      Undiagnosed cardiac murmurs      Unspecified constipation      Unspecified essential hypertension      Past Surgical History:   Procedure Laterality Date     BIOPSY       C NONSPECIFIC PROCEDURE  2005    colonoscopy polyp repeat 2010     COLONOSCOPY  2014     COMBINED CYSTOSCOPY, RETROGRADES, URETEROSCOPY, INSERT STENT Bilateral 4/3/2018    Procedure: COMBINED CYSTOSCOPY, RETROGRADES, URETEROSCOPY, INSERT STENT;;  Surgeon: Stuart King MD;  Location: [facility]     CYSTOSCOPY, BIOPSY BLADDER INSTILL OPTICAL AGENT N/A 4/3/2018    Procedure: CYSTOSCOPY, BIOPSY BLADDER INSTILL OPTICAL AGENT;  Cystoscopy, Blue Light Cystoscopy, Bladder Biopsies, Bilateral Selective ureteral washings for Cytology, Bilateral Retrograde Pyelograms,  "Bilateral Ureteroscopy;  Surgeon: Stuart King MD;  Location[facility]     CYSTOSCOPY, BIOPSY BLADDER, COMBINED N/A 2/19/2018    Procedure: COMBINED CYSTOSCOPY, BIOPSY BLADDER;  Cystoscopy, Bladder Biopsy;  Surgeon: Kenna La MD;  Location: [facility]     ENDOSCOPIC ULTRASOUND LOWER GASTROINTESTIONAL TRACT (GI) N/A 10/30/2015    Procedure: ENDOSCOPIC ULTRASOUND LOWER GASTROINTESTIONAL TRACT (GI);  Surgeon: Daniel Jean-Baptiste MD;  Location: [facility] OR     EYE SURGERY  12/4/17     LAPAROSCOPIC CHOLECYSTECTOMY WITH CHOLANGIOGRAMS N/A 11/1/2015    Procedure: LAPAROSCOPIC CHOLECYSTECTOMY WITH CHOLANGIOGRAMS;  Surgeon: Tonie Warren MD;  Location: [facility]     SURGICAL HISTORY OF -   1996    malignant melanoma     SURGICAL HISTORY OF -   1968    thyroid nodule     SURGICAL HISTORY OF -       D & C     Current Outpatient Prescriptions   Medication Sig Dispense Refill     acetaminophen 650 MG TABS Take 650 mg by mouth every 8 hours as needed for mild pain or fever 100 tablet 0     alendronate (FOSAMAX) 70 MG tablet TAKE 1 TABLET EVERY 7 DAYS AT LEAST 60 MINUTES BEFORE BREAKFAST AS DIRECTED \"SEE PACKAGE FOR ADDITIONAL INSTRUCTIONS\" 12 tablet 0     ASPIRIN PO Take 81 mg by mouth At Bedtime       Calcium Citrate-Vitamin D (CALCIUM + D PO) Take by mouth 2 times daily       colestipol (COLESTID) 1 g tablet TK 1 T PO BID - TAKE OTHER MEDS 1 HOUR BEFORE OR 4 HOURS AFTER COLESID  2     CRANBERRY CONCENTRATE PO Take 2 capsules by mouth At Bedtime        cycloSPORINE (RESTASIS) 0.05 % ophthalmic emulsion Place 1 drop into both eyes 2 times daily       diazepam (VALIUM) 2 MG tablet Take 1 tablet (2 mg) by mouth every 12 hours as needed for anxiety or sleep 20 tablet 0     irbesartan (AVAPRO) 300 MG tablet TAKE 1 TABLET EVERY DAY (Patient taking differently: Take 300 mg by mouth every morning TAKE 1 TABLET EVERY DAY) 90 tablet 2     lovastatin (MEVACOR) 40 MG tablet TAKE 1 TABLET AT BEDTIME (HYPERLIPIDEMIA LDL " GOAL  BELOW 130) 90 tablet 2     Melatonin 10 MG TABS tablet Take 10 mg by mouth nightly as needed for sleep       methimazole (TAPAZOLE) 5 MG tablet Take 1 tablet (5 mg) by mouth daily (Patient taking differently: Take 5 mg by mouth every morning ) 90 tablet 1     nitroGLYcerin (NITROSTAT) 0.4 MG sublingual tablet For chest pain place 1 tablet under the tongue every 5 minutes for 3 doses. If symptoms persist 5 minutes after 1st dose call 911. 25 tablet 3     Omega-3 Fatty Acids (FISH OIL) 500 MG CAPS Take 1 capsule by mouth 2 times daily        omeprazole (PRILOSEC) 20 MG CR capsule Take 1 capsule (20 mg) by mouth daily (Patient taking differently: Take 20 mg by mouth five times a week ) 180 capsule 3     polyethylene glycol 0.4%- propylene glycol 0.3% (SYSTANE) 0.4-0.3 % SOLN ophthalmic solution Place 1 drop into both eyes 3 times daily as needed for dry eyes       propranolol (INDERAL LA) 60 MG 24 hr capsule Take 1 capsule (60 mg) by mouth daily (Patient taking differently: Take 60 mg by mouth At Bedtime ) 90 capsule 2     Psyllium (METAMUCIL FIBER PO) Take by mouth 3 times daily       sertraline (ZOLOFT) 100 MG tablet Take 1 tablet (100 mg) by mouth daily (Patient taking differently: Take 100 mg by mouth every morning ) 90 tablet 1     traZODone (DESYREL) 50 MG tablet TAKE 1 TABLET(50 MG) BY MOUTH EVERY NIGHT AS NEEDED FOR SLEEP 90 tablet 1     OTC products: None, except as noted above    Allergies   Allergen Reactions     No Known Drug Allergies       Latex Allergy: NO    Social History   Substance Use Topics     Smoking status: Never Smoker     Smokeless tobacco: Never Used     Alcohol use No      Comment: 6 times per year-one drink     History   Drug Use No       REVIEW OF SYSTEMS:   Constitutional, neuro, ENT, endocrine, pulmonary, cardiac, gastrointestinal, genitourinary, musculoskeletal, integument and psychiatric systems are negative, except as otherwise noted.    EXAM:   /53 (BP Location: Left arm,  "Patient Position: Sitting, Cuff Size: Adult Regular)  Pulse 54  Temp 98.4  F (36.9  C) (Oral)  Resp 20  Ht 4' 9\" (1.448 m)  Wt 154 lb 4 oz (70 kg)  SpO2 97%  BMI 33.38 kg/m2    GENERAL APPEARANCE: healthy, alert and no distress     EYES: EOMI, PERRL     HENT: ear canals and TM's normal and nose and mouth without ulcers or lesions     NECK: no adenopathy, no asymmetry, masses, or scars and thyroid normal to palpation     RESP: lungs clear to auscultation - no rales, rhonchi or wheezes     CV: slightly irregular rhythm,      SKIN: no suspicious lesions or rashes on visible parts.      NEURO: Normal strength and tone, sensory exam grossly normal, some coarse tremors.      PSYCH: mentation appears normal. and affect normal/bright     DIAGNOSTICS:   No labs or EKG required for low risk surgery (cataract, skin procedure, breast biopsy, etc)    Recent Labs   Lab Test  05/21/18   1210  03/29/18   1349  03/15/18   0753  03/14/18   1109   01/16/18   1247   12/14/17   1152  12/14/17   0722   12/13/17   1659   HGB   --   12.9  13.2  13.1   < >   --    < >  12.9  Canceled, Test credited   --    --    PLT   --    --   334  346   < >   --    < >  274  Canceled, Test credited   --    --    INR   --    --    --    --    --   1.71*   --    --    --    --   1.02   NA  133   --   138  137   < >   --    < >   --    --    < >   --    POTASSIUM  4.9  5.2  4.9  4.0   < >  5.3   < >  3.9   --    < >   --    CR  0.85   --   1.00  0.96   < >   --    < >   --    --    < >   --    A1C   --    --    --    --    --    --    --   6.2*  Canceled, Test credited   --    --     < > = values in this interval not displayed.     IMPRESSION:   Reason for surgery/procedure: cataract   Diagnosis/reason for consult: preop    The proposed surgical procedure is considered LOW risk.    REVISED CARDIAC RISK INDEX  The patient has the following serious cardiovascular risks for perioperative complications such as (MI, PE, VFib and 3  AV Block):  Coronary " Artery Disease (MI, positive stress test, angina, Qs on EKG)  Congestive Heart Failure (pulmonary edema, PND, s3 mary beth, CXR with pulmonary congestion, basilar rales)  INTERPRETATION: 2 risks: Class III (moderate risk - 6.6% complication rate)    The patient has the following additional risks for perioperative complications:  See below      ICD-10-CM    1. Preop general physical exam Z01.818    2. Cataract of left eye, unspecified cataract type H26.9    3. Atrial fibrillation, unspecified type (H) I48.91    4. Chronic systolic heart failure (H) I50.22        RECOMMENDATIONS:      --Consult hospital rounder / IM to assist post-op medical management if any complication arises.    --Patient is to take all scheduled medications on the day of surgery EXCEPT for modifications listed below.  - stop fish oil 1 week before surgery. Not using any nsaids.   - continue baby aspirin due to low risk of bleeding and chronic cardiac history.     APPROVAL GIVEN to proceed with proposed procedure, without further diagnostic evaluation       Signed Electronically by: Pop Zapien MD, MD    Copy of this evaluation report is provided to requesting physician.    [facility] Preop Guidelines    Revised Cardiac Risk Index

## 2018-05-25 NOTE — MR AVS SNAPSHOT
After Visit Summary   5/25/2018    Dimple Oviedo    MRN: [id number]           Patient Information     Date Of Birth          6/23/1933        Visit Information        Provider Department      5/25/2018 2:40 PM Pop Zapien MD [facility]        Today's Diagnoses     Preop general physical exam    -  1    Cataract of left eye, unspecified cataract type        Atrial fibrillation, unspecified type (H)        Chronic systolic heart failure (H)          Care Instructions      Before Your Surgery      Call your surgeon if there is any change in your health. This includes signs of a cold or flu (such as a sore throat, runny nose, cough, rash or fever).    Do not smoke, drink alcohol or take over the counter medicine (unless your surgeon or primary care doctor tells you to) for the 24 hours before and after surgery.    If you take prescribed drugs: Follow your doctor s orders about which medicines to take and which to stop until after surgery.    Eating and drinking prior to surgery: follow the instructions from your surgeon    Take a shower or bath the night before surgery. Use the soap your surgeon gave you to gently clean your skin. If you do not have soap from your surgeon, use your regular soap. Do not shave or scrub the surgery site.  Wear clean pajamas and have clean sheets on your bed.           Follow-ups after your visit        Your next 10 appointments already scheduled     May 29, 2018 11:00 AM CDT   (Arrive by 10:45 AM)   RETURN ENDOCRINE with Dhara Meza MD   [facility] Endocrinology ([facility])    [address] [facility] [address] [phone]            Jun 11, 2018 11:00 AM CDT   ULTRASOUND with [facility] ULTRASOUND   [facility] ([facility])    [facility] [address] [address] [phone]            Jul 05, 2018   "1:30 PM CDT   (Arrive by 1:15 PM)   NEW ARRHYTHMIA with Butch Griffith MD   [facility] ([facility])    [address]  [address]  [facility] [address] [phone]            Jul 26, 2018  2:20 PM CDT   (Arrive by 2:05 PM)   Return Visit with Stuart King MD   [facility] ([facility])    [address]  [address]  [facility] [address] [phone]              Who to contact     If you have questions or need follow up information about today's clinic visit or your schedule please contact [facility] directly at [phone].  Normal or non-critical lab and imaging results will be communicated to you by MyChart, letter or phone within 4 business days after the clinic has received the results. If you do not hear from us within 7 days, please contact the clinic through MyChart or phone. If you have a critical or abnormal lab result, we will notify you by phone as soon as possible.  Submit refill requests through [employer] or call your pharmacy and they will forward the refill request to us. Please allow 3 business days for your refill to be completed.          Additional Information About Your Visit        [employer] Information     [employer] gives you secure access to your electronic health record. If you see a primary care provider, you can also send messages to your care team and make appointments. If you have questions, please call your primary care clinic.  If you do not have a primary care provider, please call [phone] and they will assist you.        Care EveryWhere ID     This is your Care EveryWhere ID. This could be used by other organizations to access your [place] medical records  [id number]        Your Vitals Were     Pulse Temperature Respirations Height Pulse Oximetry BMI (Body Mass Index)    54 98.4  F (36.9  C) (Oral) 20 4' 9\" (1.448 m) 97% " 33.38 kg/m2       Blood Pressure from Last 3 Encounters:   05/25/18 126/53   05/22/18 148/74   05/21/18 147/83    Weight from Last 3 Encounters:   05/25/18 154 lb 4 oz (70 kg)   05/22/18 153 lb 3.2 oz (69.5 kg)   05/21/18 148 lb 14.4 oz (67.5 kg)              Today, you had the following     No orders found for display         Today's Medication Changes          These changes are accurate as of 5/25/18  3:08 PM.  If you have any questions, ask your nurse or doctor.               These medicines have changed or have updated prescriptions.        Dose/Directions    irbesartan 300 MG tablet   Commonly known as:  AVAPRO   This may have changed:    - how much to take  - how to take this  - when to take this  - additional instructions   Used for:  Essential hypertension with goal blood pressure less than 140/90        TAKE 1 TABLET EVERY DAY   Quantity:  90 tablet   Refills:  2       methimazole 5 MG tablet   Commonly known as:  TAPAZOLE   This may have changed:  when to take this   Used for:  Nontoxic multinodular goiter        Dose:  5 mg   Take 1 tablet (5 mg) by mouth daily   Quantity:  90 tablet   Refills:  1       omeprazole 20 MG CR capsule   Commonly known as:  priLOSEC   This may have changed:  when to take this   Used for:  Gastroesophageal reflux disease without esophagitis        Dose:  20 mg   Take 1 capsule (20 mg) by mouth daily   Quantity:  180 capsule   Refills:  3       propranolol 60 MG 24 hr capsule   Commonly known as:  INDERAL LA   This may have changed:  when to take this   Used for:  Nontoxic multinodular goiter        Dose:  60 mg   Take 1 capsule (60 mg) by mouth daily   Quantity:  90 capsule   Refills:  2       sertraline 100 MG tablet   Commonly known as:  ZOLOFT   This may have changed:  when to take this   Used for:  THERON (generalized anxiety disorder)        Dose:  100 mg   Take 1 tablet (100 mg) by mouth daily   Quantity:  90 tablet   Refills:  1                Primary Care Provider Office  "Phone # Fax #    Pop Antonino Zapien [phone] [address] [facility] [address]        Equal Access to Services     GUNNRE RODRIGUEZ : Aleja shameka chacon bryce Pollack, waberylda luqadaha, qaybta kaazeemda saira, maldonado molina laYarelisele bey. So [facility] [phone].    ATENCIÓN: Si habla español, tiene a sierra disposición servicios gratuitos de asistencia lingüística. Llame al [phone].    We comply with applicable federal civil rights laws and [place] laws. We do not discriminate on the basis of race, color, national origin, age, disability, sex, sexual orientation, or gender identity.            Thank you!     Thank you for choosing [facility]  for your care. Our goal is always to provide you with excellent care. Hearing back from our patients is one way we can continue to improve our services. Please take a few minutes to complete the written survey that you may receive in the mail after your visit with us. Thank you!             Your Updated Medication List - Protect others around you: Learn how to safely use, store and throw away your medicines at www.disposemymeds.org.          This list is accurate as of 5/25/18  3:08 PM.  Always use your most recent med list.                   Brand Name Dispense Instructions for use Diagnosis    acetaminophen 650 MG 8 hour tablet     100 tablet    Take 650 mg by mouth every 8 hours as needed for mild pain or fever    Ascending cholangitis       alendronate 70 MG tablet    FOSAMAX    12 tablet    TAKE 1 TABLET EVERY 7 DAYS AT LEAST 60 MINUTES BEFORE BREAKFAST AS DIRECTED \"SEE PACKAGE FOR ADDITIONAL INSTRUCTIONS\"    High risk medication use, Osteopenia       ASPIRIN PO      Take 81 mg by mouth At Bedtime        CALCIUM + D PO      Take by mouth 2 times daily        colestipol 1 g tablet    COLESTID     TK 1 T PO BID - TAKE OTHER MEDS 1 HOUR BEFORE OR 4 HOURS AFTER COLESID        CRANBERRY CONCENTRATE PO      Take 2 " capsules by mouth At Bedtime        cycloSPORINE 0.05 % ophthalmic emulsion    RESTASIS     Place 1 drop into both eyes 2 times daily        diazepam 2 MG tablet    VALIUM    20 tablet    Take 1 tablet (2 mg) by mouth every 12 hours as needed for anxiety or sleep    THERON (generalized anxiety disorder)       Fish Oil 500 MG Caps      Take 1 capsule by mouth 2 times daily        irbesartan 300 MG tablet    AVAPRO    90 tablet    TAKE 1 TABLET EVERY DAY    Essential hypertension with goal blood pressure less than 140/90       lovastatin 40 MG tablet    MEVACOR    90 tablet    TAKE 1 TABLET AT BEDTIME (HYPERLIPIDEMIA LDL GOAL  BELOW 130)    Hyperlipidemia LDL goal <130       Melatonin 10 MG Tabs tablet      Take 10 mg by mouth nightly as needed for sleep        METAMUCIL FIBER PO      Take by mouth 3 times daily        methimazole 5 MG tablet    TAPAZOLE    90 tablet    Take 1 tablet (5 mg) by mouth daily    Nontoxic multinodular goiter       nitroGLYcerin 0.4 MG sublingual tablet    NITROSTAT    25 tablet    For chest pain place 1 tablet under the tongue every 5 minutes for 3 doses. If symptoms persist 5 minutes after 1st dose call 911.    Elevated troponin       omeprazole 20 MG CR capsule    priLOSEC    180 capsule    Take 1 capsule (20 mg) by mouth daily    Gastroesophageal reflux disease without esophagitis       propranolol 60 MG 24 hr capsule    INDERAL LA    90 capsule    Take 1 capsule (60 mg) by mouth daily    Nontoxic multinodular goiter       sertraline 100 MG tablet    ZOLOFT    90 tablet    Take 1 tablet (100 mg) by mouth daily    THERON (generalized anxiety disorder)       SYSTANE 0.4-0.3 % Soln ophthalmic solution   Generic drug:  polyethylene glycol 0.4%- propylene glycol 0.3%      Place 1 drop into both eyes 3 times daily as needed for dry eyes        traZODone 50 MG tablet    DESYREL    90 tablet    TAKE 1 TABLET(50 MG) BY MOUTH EVERY NIGHT AS NEEDED FOR SLEEP    Insomnia, unspecified type

## 2018-05-29 ENCOUNTER — OFFICE VISIT (OUTPATIENT)
Dept: ENDOCRINOLOGY | Facility: CLINIC | Age: 83
End: 2018-05-29
Payer: MEDICARE

## 2018-05-29 VITALS
HEART RATE: 77 BPM | WEIGHT: 154 LBS | DIASTOLIC BLOOD PRESSURE: 68 MMHG | SYSTOLIC BLOOD PRESSURE: 139 MMHG | HEIGHT: 57 IN | BODY MASS INDEX: 33.22 KG/M2

## 2018-05-29 DIAGNOSIS — E05.90 HYPERTHYROIDISM: Primary | ICD-10-CM

## 2018-05-29 DIAGNOSIS — E05.90 HYPERTHYROIDISM: ICD-10-CM

## 2018-05-29 LAB
T3 SERPL-MCNC: 83 NG/DL (ref 60–181)
T4 FREE SERPL-MCNC: 0.72 NG/DL (ref 0.76–1.46)
TSH SERPL DL<=0.005 MIU/L-ACNC: 2.1 MU/L (ref 0.4–4)

## 2018-05-29 PROCEDURE — 84480 ASSAY TRIIODOTHYRONINE (T3): CPT | Performed by: INTERNAL MEDICINE

## 2018-05-29 RX ORDER — FUROSEMIDE 20 MG
TABLET ORAL
COMMUNITY
Start: 2018-03-31 | End: 2018-07-05

## 2018-05-29 ASSESSMENT — PAIN SCALES - GENERAL: PAINLEVEL: NO PAIN (0)

## 2018-05-29 NOTE — LETTER
5/29/2018       RE: Dimple Oviedo  [address]  [facility] [id number]     Dear Colleague,    Thank you for referring your patient, Dimple Oviedo, to the [facility] ENDOCRINOLOGY at [facility]. Please see a copy of my visit note below.    Endocrinology Clinic Visit      May 29, 2018              Chief Complaint:hyperthyroidism   Subjective:         HPI: Dimple Oviedo is a 84 year old year old female who is here for a clinic visit for follow up of her hyperthyroidism.   she  has a past medical history of Calculus of kidney; Esophageal reflux; GERD (gastroesophageal reflux disease); Hyperlipidemia LDL goal <130 (5/9/2010); Malignant melanoma of skin of trunk, except scrotum (H); Nonspecific abnormal finding; Nontoxic multinodular goiter; Osteopenia; Other psoriasis; Personal history of colonic polyps; PMR (polymyalgia rheumatica) (H); Stress-induced cardiomyopathy; Undiagnosed cardiac murmurs; Unspecified constipation; and Unspecified essential hypertension.  She has a history of thyroid disease since the 1960s at which time a thyroid nodule was removed.  She developed symptoms of hyperthyroidism in December(most disturbing being tremor) and was started on Methimazole 5mg TID but with not much relief of symptoms.   Thyroid USG in 12/17 showed f/s/o multinodular goitre.   She was admitted in 12/17 for stress cardiomyopathy and had a coronary angiogram with an iodine dye load. In 1/18, she developed palpitations and was put on a beta blockers for the same.      She reports feeling okay. She still continues to have hematuria with lower abdominal discomfort. Bladder biopsies done turned out negative.   Energy levels are slightly low , appetite is normal and sleep is normal with the pill(Trazodone) she is taking for it . She has mild hot and cold intolerance and sweating. Tremors continue to persist. No palpitations, chest pain. She has occasional SOB on exertion. No  "change in bowel habits.No  skin or eye changes although she reports loss of hair. She has lost 20 pounds since 12/17 but has had dietary alterations in the interim.   TFT done in 3/18 shows TSH of 2.72 with FT4 0.69.   She is due for a cataract surgery on 6/4.       Weight trend  Wt Readings from Last 4 Encounters:   05/25/18 70 kg (154 lb 4 oz)   05/22/18 69.5 kg (153 lb 3.2 oz)   05/21/18 67.5 kg (148 lb 14.4 oz)   04/30/18 68.5 kg (151 lb)              Allergies   Allergen Reactions     No Known Drug Allergies        Current Outpatient Prescriptions   Medication Sig Dispense Refill     acetaminophen 650 MG TABS Take 650 mg by mouth every 8 hours as needed for mild pain or fever 100 tablet 0     alendronate (FOSAMAX) 70 MG tablet TAKE 1 TABLET EVERY 7 DAYS AT LEAST 60 MINUTES BEFORE BREAKFAST AS DIRECTED \"SEE PACKAGE FOR ADDITIONAL INSTRUCTIONS\" 12 tablet 0     ASPIRIN PO Take 81 mg by mouth At Bedtime       Calcium Citrate-Vitamin D (CALCIUM + D PO) Take by mouth 2 times daily       colestipol (COLESTID) 1 g tablet TK 1 T PO BID - TAKE OTHER MEDS 1 HOUR BEFORE OR 4 HOURS AFTER COLESID  2     CRANBERRY CONCENTRATE PO Take 2 capsules by mouth At Bedtime        cycloSPORINE (RESTASIS) 0.05 % ophthalmic emulsion Place 1 drop into both eyes 2 times daily       diazepam (VALIUM) 2 MG tablet Take 1 tablet (2 mg) by mouth every 12 hours as needed for anxiety or sleep 20 tablet 0     irbesartan (AVAPRO) 300 MG tablet TAKE 1 TABLET EVERY DAY (Patient taking differently: Take 300 mg by mouth every morning TAKE 1 TABLET EVERY DAY) 90 tablet 2     lovastatin (MEVACOR) 40 MG tablet TAKE 1 TABLET AT BEDTIME (HYPERLIPIDEMIA LDL GOAL  BELOW 130) 90 tablet 2     Melatonin 10 MG TABS tablet Take 10 mg by mouth nightly as needed for sleep       methimazole (TAPAZOLE) 5 MG tablet Take 1 tablet (5 mg) by mouth daily (Patient taking differently: Take 5 mg by mouth every morning ) 90 tablet 1     nitroGLYcerin (NITROSTAT) 0.4 MG " sublingual tablet For chest pain place 1 tablet under the tongue every 5 minutes for 3 doses. If symptoms persist 5 minutes after 1st dose call 911. 25 tablet 3     Omega-3 Fatty Acids (FISH OIL) 500 MG CAPS Take 1 capsule by mouth 2 times daily        omeprazole (PRILOSEC) 20 MG CR capsule Take 1 capsule (20 mg) by mouth daily (Patient taking differently: Take 20 mg by mouth five times a week ) 180 capsule 3     polyethylene glycol 0.4%- propylene glycol 0.3% (SYSTANE) 0.4-0.3 % SOLN ophthalmic solution Place 1 drop into both eyes 3 times daily as needed for dry eyes       propranolol (INDERAL LA) 60 MG 24 hr capsule Take 1 capsule (60 mg) by mouth daily (Patient taking differently: Take 60 mg by mouth At Bedtime ) 90 capsule 2     Psyllium (METAMUCIL FIBER PO) Take by mouth 3 times daily       sertraline (ZOLOFT) 100 MG tablet Take 1 tablet (100 mg) by mouth daily (Patient taking differently: Take 100 mg by mouth every morning ) 90 tablet 1     traZODone (DESYREL) 50 MG tablet TAKE 1 TABLET(50 MG) BY MOUTH EVERY NIGHT AS NEEDED FOR SLEEP 90 tablet 1       Review of Systems     Constitutional: Negative for fever and unexpected weight change. Appetite normal.   Head: no headache   Eye: no vision change/ loss of peripheral vision.   ENT: No throat congestion.   Respiratory: no cough   Cardiovascular: no chest pain or tachycardia  Gastrointestinal: Negative for vomiting, abdominal pain and diarrhea.  Genitourinary: Hematuria+.  Musculoskeletal: Negative for myalgias. No weakness.  Neurological: Negative for seizures and headaches.  Psychiatric/Behavioral: Negative for behavioral problem and dysphoric mood.    Past Medical History:   Diagnosis Date     Calculus of kidney      Esophageal reflux      GERD (gastroesophageal reflux disease)      Hyperlipidemia LDL goal <130 5/9/2010     Malignant melanoma of skin of trunk, except scrotum (H)      Nonspecific abnormal finding     has living will 2004 -      Nontoxic  multinodular goiter     no further eval /tx rec per pt     Osteopenia      Other psoriasis      Personal history of colonic polyps      PMR (polymyalgia rheumatica) (H)      Stress-induced cardiomyopathy      Undiagnosed cardiac murmurs      Unspecified constipation      Unspecified essential hypertension        Past Surgical History:   Procedure Laterality Date     BIOPSY       C NONSPECIFIC PROCEDURE  2005    colonoscopy polyp repeat 2010     COLONOSCOPY  2014     COMBINED CYSTOSCOPY, RETROGRADES, URETEROSCOPY, INSERT STENT Bilateral 4/3/2018    Procedure: COMBINED CYSTOSCOPY, RETROGRADES, URETEROSCOPY, INSERT STENT;;  Surgeon: Stuart King MD;  Location: [facility] OR     CYSTOSCOPY, BIOPSY BLADDER INSTILL OPTICAL AGENT N/A 4/3/2018    Procedure: CYSTOSCOPY, BIOPSY BLADDER INSTILL OPTICAL AGENT;  Cystoscopy, Blue Light Cystoscopy, Bladder Biopsies, Bilateral Selective ureteral washings for Cytology, Bilateral Retrograde Pyelograms, Bilateral Ureteroscopy;  Surgeon: Stuart King MD;  Location: [facility] OR     CYSTOSCOPY, BIOPSY BLADDER, COMBINED N/A 2/19/2018    Procedure: COMBINED CYSTOSCOPY, BIOPSY BLADDER;  Cystoscopy, Bladder Biopsy;  Surgeon: Kenna La MD;  Location: [facility] OR     ENDOSCOPIC ULTRASOUND LOWER GASTROINTESTIONAL TRACT (GI) N/A 10/30/2015    Procedure: ENDOSCOPIC ULTRASOUND LOWER GASTROINTESTIONAL TRACT (GI);  Surgeon: Daniel Jean-Baptiste MD;  Location: [facility] OR     EYE SURGERY  12/4/17     LAPAROSCOPIC CHOLECYSTECTOMY WITH CHOLANGIOGRAMS N/A 11/1/2015    Procedure: LAPAROSCOPIC CHOLECYSTECTOMY WITH CHOLANGIOGRAMS;  Surgeon: Tonie Warren MD;  Location: [facility] OR     SURGICAL HISTORY OF -   1996    malignant melanoma     SURGICAL HISTORY OF -   1968    thyroid nodule     SURGICAL HISTORY OF -       D & C       Family History   Problem Relation Age of Onset     CANCER Father      dec - esophageal and laryngeal     HEART DISEASE Mother      Respiratory Mother      dec      Breast Cancer Daughter      Other Cancer Daughter      Thyroid Disease Daughter      Asthma Daughter      Hyperlipidemia Son      DIABETES Son        Social History     Social History     Marital status:      Spouse name:      Number of children: 2     Years of education: N/A     Occupational History      Retired     Social History Main Topics     Smoking status: Never Smoker     Smokeless tobacco: Never Used     Alcohol use No      Comment: 6 times per year-one drink     Drug use: No     Sexual activity: Yes     Partners: Male                                              Social History Narrative    December 7, 2009    Balanced Diet - Yes    Osteoporosis Prevention Measures - Dairy servings per day: 2 and Medication/Supplements (See current meds)    Regular Exercise -  Yes Describe curves, walking    Dental Exam - YES - Date: 10/2009    Eye Exam - YES - Date: 2008    Self Breast Exam - Yes    Abuse: Current or Past (Physical, Sexual or Emotional)- No    Do you feel safe in your environment - Yes    Guns stored in the home - No    Sunscreen used - Yes    Seatbelts used - Yes    Lipids -  YES - Date: 11/24/2009    Glucose -  YES - Date: 11/24/2009    Colon Cancer Screening - Colonoscopy 11/18/2005(date completed)    Hemoccults - YES - Date: 10/12/2004    Pap Test -  YES - Date: 09/22/2004    Do you have any concerns about STD's -  No    Mammography - YES - Date: 11/24/2009    DEXA - YES - Date: 11/20/2008    Immunizations reviewed and up to date - Yes    PAULETTE Spencer CMA                 EXAMINATION: Thyroid ultrasound, 12/18/2017 1:49 PM       COMPARISON: Chest CT 12/13/2017, thyroid ultrasound 10/26/2006      HISTORY: The referring physician requested comparison to I123 scan -  to be done prior to this U/S Multinodular goiter; however the patient  deferred the nuclear medicine exam secondary to recent myocardial  infarction.      Technique: Grayscale and color ultrasound imaging of the thyroid  was  performed.      Findings:    The thyroid is enlarged and diffusely heterogeneous with the  parenchyma composed of innumerable nodules, predominantly solid with  some cystic changes well. There are coarse, dystrophic appearing  calcifications on the left as well as punctate echogenic foci, many of  which demonstrate ring down/comet tail artifact, likely inspissated  colloid, with more pronounced associated cysts seen on prior  ultrasound of 10/2006 in the left lobe of the thyroid gland. There are  additional punctate echogenic foci in both lobes which may represent  microcalcifications. Multiple nodules demonstrate hypervascularity.      The right lobe of the thyroid measures: 4.4 x 3.3 x 5.1 cm.       The thyroid isthmus measures: 0.6 cm.      The left lobe of the thyroid measures: 2.4 x 3.3 x 5.3 cm.           Impression:  Enlarged heterogeneous thyroid gland composed of innumerable nodules,  predominantly solid with some cystic changes well. Coarse, dystrophic  appearing calcifications on the left as well as punctate echogenic  foci bilaterally, likely inspissated colloid with possible  microcalcifications. Comparison to I-123 scan was requested however  the patient declined that exam.      I have personally reviewed the examination and initial interpretation  and I agree with the findings.      KASANDRA WALKER MD          Objective:   There were no vitals taken for this visit.    General: well developed, well nourished, in NAD  Eyes: anicteric, normal extra-occular movements, no lid lag, no stare  HEENT: MMM, oropharynx clear, no LAD, thyroid diffusely enlarged to twice its size.    CV: RRR, normal S1/S2, no MRG  Pulm: CTAB, no wheezes or crackles  Abd: soft, non tender, non distended, NABS, no HSM  Neuro: alert, CNII-XII intact, strength 5/5 in all 4 extremities, sensation intact to light touch, DTR- mildly exaggerated  Extremities: no LE edema, UE tremor+  Skin: No lesions noted, normal texture    In House  Labs:   Lab Results   Component Value Date    A1C 6.2 12/14/2017    A1C Canceled, Test credited 12/14/2017    A1C 5.8 02/24/2016    A1C 6.4 11/18/2015    A1C 5.9 03/08/2012       TSH   Date Value Ref Range Status   03/29/2018 2.72 0.40 - 4.00 mU/L Final   03/14/2018 1.94 0.40 - 4.00 mU/L Final   02/14/2018 1.86 0.40 - 4.00 mU/L Final   02/07/2018 1.54 0.40 - 4.00 mU/L Final   01/27/2018 0.10 (L) 0.40 - 4.00 mU/L Final     T4 Free   Date Value Ref Range Status   03/29/2018 0.69 (L) 0.76 - 1.46 ng/dL Final   02/14/2018 0.76 0.76 - 1.46 ng/dL Final   02/07/2018 0.68 (L) 0.76 - 1.46 ng/dL Final   01/27/2018 0.87 0.76 - 1.46 ng/dL Final   01/16/2018 1.41 0.76 - 1.46 ng/dL Final       Creatinine   Date Value Ref Range Status   05/21/2018 0.85 0.52 - 1.04 mg/dL Final   ]    Recent Labs   Lab Test  12/14/17   0722  09/25/17   0914   04/28/15   0934  02/18/14   0925   CHOL  126  183   < >  163  170   HDL  44*  57   < >  64  46*   LDL  56  95   < >  67  94   TRIG  134  155*   < >  161*  150   CHOLHDLRATIO   --    --    --   2.5  3.7    < > = values in this interval not displayed.           Assessment/Treatment Plan:      Dimple Oviedo is a 84 year old year old female with hyperthyroidism, on treatment since late last year.   The cause of her hyperthyroidism is unclear and could be any of the following  1. MNG with an autonomous nodule- given her age, enlarged thyroid and the results of the USG, an autonomous nodule in an MNG is a likely cause.   2. Grave's disease- in keeping with her findings of a diffusely enlarged thyroid causing hyperthyroidism. TSI done before was on the upper level of normal and was therefore not a useful marker of presence of absence of the condition.   3. Thyroiditis- As she was largely asymptomatic prior to last year, it is possible that a transient thyroiditis was the cause although the time period of her hyperthyroid phase is longer than typical.   4. Iodine induced hyperthyroidism- the patient  had a large load of Iodine given to her in December which may be the cause or a contributor to her condition.     The way to differentiate between these conditions would be to use a radioiodide uptake and scan. However, early this year it was not possible to obtain this as she had had an iodine load in 12/17.   The plan is to recheck her TFT today as it has been > 6 weeks since her last check. A TSI to can be checked with this blood draw to help us decide whether Grave's disease is the cause, given her inconclusive value previously.  It is also planned to stop her medication and recheck her thyroid function in around 2 weeks after the cessation.  -If the patient is hyperthyroid after cessation, we will perform a scan after radioiodide uptake. In the event of the scan showing f/s/o Grave's disease or an MNG with an autonomous nodule, we will plan for LUNA ablation(differing doses depending upon her condition)  as it is simple, effective, permanent and supplementation with thyroid hormone from possibly resulting hypothyroidism is safer and easier to manage than lifelong treatment with Methimazole.   - if the patient is euthyroid after cessation, we can wait and watch as it was likely a transient hyperthyroidism due to thyroiditis or iodine.     Tori Mckeon  Student, Endocrinology  Answers for HPI/ROS submitted by the patient on 5/18/2018   General Symptoms: Yes  Skin Symptoms: Yes  HENT Symptoms: Yes  EYE SYMPTOMS: Yes  HEART SYMPTOMS: Yes  LUNG SYMPTOMS: Yes  INTESTINAL SYMPTOMS: Yes  URINARY SYMPTOMS: Yes  GYNECOLOGIC SYMPTOMS: Yes  BREAST SYMPTOMS: No  SKELETAL SYMPTOMS: Yes  BLOOD SYMPTOMS: No  NERVOUS SYSTEM SYMPTOMS: Yes  MENTAL HEALTH SYMPTOMS: Yes  Fever: No  Loss of appetite: No  Weight loss: No  Weight gain: No  Fatigue: Yes  Night sweats: No  Chills: Yes  Increased stress: Yes  Excessive hunger: Yes  Excessive thirst: No  Feeling hot or cold when others believe the temperature is normal: Yes  Loss of  height: Yes  Post-operative complications: No  Surgical site pain: No  Hallucinations: No  Change in or Loss of Energy: Yes  Hyperactivity: Yes  Confusion: No  Changes in hair: Yes  Changes in moles/birth marks: No  Itching: No  Rashes: No  Changes in nails: Yes  Acne: No  Hair in places you don't want it: No  Change in facial hair: No  Warts: No  Non-healing sores: No  Scarring: No  Flaking of skin: No  Color changes of hands/feet in cold : No  Sun sensitivity: No  Skin thickening: No  Ear pain: No  Ear discharge: No  Hearing loss: Yes  Tinnitus: No  Nosebleeds: Yes  Congestion: No  Sinus pain: No  Trouble swallowing: No   Voice hoarseness: Yes  Mouth sores: No  Sore throat: No  Tooth pain: No  Gum tenderness: No  Bleeding gums: No  Change in taste: Yes  Change in sense of smell: Yes  Dry mouth: Yes  Hearing aid used: Yes  Neck lump: No  Eye pain: No  Vision loss: Yes  Dry eyes: Yes  Watery eyes: No  Eye bulging: No  Double vision: No  Flashing of lights: No  Spots: No  Floaters: Yes  Redness: No  Crossed eyes: No  Tunnel Vision: No  Yellowing of eyes: No  Eye irritation: No  Cough: No  Sputum or phlegm: No  Coughing up blood: No  Difficulty breating or shortness of breath: Yes  Snoring: No  Wheezing: No  Difficulty breathing on exertion: Yes  Nighttime Cough: No  Difficulty breathing when lying flat: No  Chest pain or pressure: No  Fast or irregular heartbeat: No  Pain in legs with walking: No  Trouble breathing while lying down: No  Fingers or toes appear blue: No  High blood pressure: Yes  Low blood pressure: Yes  Fainting: No  Murmurs: Yes  Pacemaker: No  Varicose veins: No  Edema or swelling: No  Wake up at night with shortness of breath: No  Light-headedness: Yes  Exercise intolerance: Yes  Heart burn or indigestion: No  Nausea: Yes  Vomiting: No  Abdominal pain: No  Bloating: No  Constipation: Yes  Diarrhea: Yes  Blood in stool: No  Black stools: No  Rectal or Anal pain: No  Fecal incontinence:  Yes  Yellowing of skin or eyes: No  Vomit with blood: No  Change in stools: Yes  Trouble holding urine or incontinence: Yes  Pain or burning: No  Trouble starting or stopping: Yes  Increased frequency of urination: Yes  Blood in urine: Yes  Decreased frequency of urination: No  Frequent nighttime urination: Yes  Flank pain: No  Difficulty emptying bladder: Yes  Back pain: Yes  Muscle aches: No  Neck pain: Yes  Swollen joints: No  Joint pain: Yes  Bone pain: No  Muscle cramps: Yes  Muscle weakness: No  Joint stiffness: Yes  Bone fracture: No  Trouble with coordination: No  Dizziness or trouble with balance: Yes  Fainting or black-out spells: No  Memory loss: No  Headache: No  Seizures: No  Speech problems: No  Tingling: No  Tremor: Yes  Weakness: Yes  Difficulty walking: Yes  Paralysis: No  Numbness: No  Bleeding or spotting between periods: No  Heavy or painful periods: No  Irregular periods: No  Vaginal discharge: No  Hot flashes: Yes  Vaginal dryness: Yes  Genital ulcers: No  Reduced libido: Yes  Painful intercourse: No  Post-menopausal bleeding: No  Nervous or Anxious: Yes  Depression: Yes  Trouble sleeping: Yes  Trouble thinking or concentrating: No  Mood changes: Yes  Panic attacks: Yes        Physician Attestation   I, Dhara Meza, was present with the medical student who participated in the service and in the documentation of the note.  I have verified the history and personally performed the physical exam and medical decision making.  I agree with the assessment and plan of care as documented in the note.      Items personally reviewed: vitals, labs, imaging.  On my exam she has no tremor today, thyroid is twice normal in size w/o distinct nodules. .    Dhara Meza MD

## 2018-05-29 NOTE — PROGRESS NOTES
Endocrinology Clinic Visit      May 29, 2018              Chief Complaint:hyperthyroidism   Subjective:         HPI: Dimple Oviedo is a 84 year old year old female who is here for a clinic visit for follow up of her hyperthyroidism.   she  has a past medical history of Calculus of kidney; Esophageal reflux; GERD (gastroesophageal reflux disease); Hyperlipidemia LDL goal <130 (5/9/2010); Malignant melanoma of skin of trunk, except scrotum (H); Nonspecific abnormal finding; Nontoxic multinodular goiter; Osteopenia; Other psoriasis; Personal history of colonic polyps; PMR (polymyalgia rheumatica) (H); Stress-induced cardiomyopathy; Undiagnosed cardiac murmurs; Unspecified constipation; and Unspecified essential hypertension.  She has a history of thyroid disease since the 1960s at which time a thyroid nodule was removed.  She developed symptoms of hyperthyroidism in December(most disturbing being tremor) and was started on Methimazole 5mg TID but with not much relief of symptoms.   Thyroid USG in 12/17 showed f/s/o multinodular goitre.   She was admitted in 12/17 for stress cardiomyopathy and had a coronary angiogram with an iodine dye load. In 1/18, she developed palpitations and was put on a beta blockers for the same.      She reports feeling okay. She still continues to have hematuria with lower abdominal discomfort. Bladder biopsies done turned out negative.   Energy levels are slightly low , appetite is normal and sleep is normal with the pill(Trazodone) she is taking for it . She has mild hot and cold intolerance and sweating. Tremors continue to persist. No palpitations, chest pain. She has occasional SOB on exertion. No change in bowel habits.No  skin or eye changes although she reports loss of hair. She has lost 20 pounds since 12/17 but has had dietary alterations in the interim.   TFT done in 3/18 shows TSH of 2.72 with FT4 0.69.   She is due for a cataract surgery on 6/4.       Weight trend  Wt Readings  "from Last 4 Encounters:   05/25/18 70 kg (154 lb 4 oz)   05/22/18 69.5 kg (153 lb 3.2 oz)   05/21/18 67.5 kg (148 lb 14.4 oz)   04/30/18 68.5 kg (151 lb)              Allergies   Allergen Reactions     No Known Drug Allergies        Current Outpatient Prescriptions   Medication Sig Dispense Refill     acetaminophen 650 MG TABS Take 650 mg by mouth every 8 hours as needed for mild pain or fever 100 tablet 0     alendronate (FOSAMAX) 70 MG tablet TAKE 1 TABLET EVERY 7 DAYS AT LEAST 60 MINUTES BEFORE BREAKFAST AS DIRECTED \"SEE PACKAGE FOR ADDITIONAL INSTRUCTIONS\" 12 tablet 0     ASPIRIN PO Take 81 mg by mouth At Bedtime       Calcium Citrate-Vitamin D (CALCIUM + D PO) Take by mouth 2 times daily       colestipol (COLESTID) 1 g tablet TK 1 T PO BID - TAKE OTHER MEDS 1 HOUR BEFORE OR 4 HOURS AFTER COLESID  2     CRANBERRY CONCENTRATE PO Take 2 capsules by mouth At Bedtime        cycloSPORINE (RESTASIS) 0.05 % ophthalmic emulsion Place 1 drop into both eyes 2 times daily       diazepam (VALIUM) 2 MG tablet Take 1 tablet (2 mg) by mouth every 12 hours as needed for anxiety or sleep 20 tablet 0     irbesartan (AVAPRO) 300 MG tablet TAKE 1 TABLET EVERY DAY (Patient taking differently: Take 300 mg by mouth every morning TAKE 1 TABLET EVERY DAY) 90 tablet 2     lovastatin (MEVACOR) 40 MG tablet TAKE 1 TABLET AT BEDTIME (HYPERLIPIDEMIA LDL GOAL  BELOW 130) 90 tablet 2     Melatonin 10 MG TABS tablet Take 10 mg by mouth nightly as needed for sleep       methimazole (TAPAZOLE) 5 MG tablet Take 1 tablet (5 mg) by mouth daily (Patient taking differently: Take 5 mg by mouth every morning ) 90 tablet 1     nitroGLYcerin (NITROSTAT) 0.4 MG sublingual tablet For chest pain place 1 tablet under the tongue every 5 minutes for 3 doses. If symptoms persist 5 minutes after 1st dose call 911. 25 tablet 3     Omega-3 Fatty Acids (FISH OIL) 500 MG CAPS Take 1 capsule by mouth 2 times daily        omeprazole (PRILOSEC) 20 MG CR capsule Take 1 " capsule (20 mg) by mouth daily (Patient taking differently: Take 20 mg by mouth five times a week ) 180 capsule 3     polyethylene glycol 0.4%- propylene glycol 0.3% (SYSTANE) 0.4-0.3 % SOLN ophthalmic solution Place 1 drop into both eyes 3 times daily as needed for dry eyes       propranolol (INDERAL LA) 60 MG 24 hr capsule Take 1 capsule (60 mg) by mouth daily (Patient taking differently: Take 60 mg by mouth At Bedtime ) 90 capsule 2     Psyllium (METAMUCIL FIBER PO) Take by mouth 3 times daily       sertraline (ZOLOFT) 100 MG tablet Take 1 tablet (100 mg) by mouth daily (Patient taking differently: Take 100 mg by mouth every morning ) 90 tablet 1     traZODone (DESYREL) 50 MG tablet TAKE 1 TABLET(50 MG) BY MOUTH EVERY NIGHT AS NEEDED FOR SLEEP 90 tablet 1       Review of Systems     Constitutional: Negative for fever and unexpected weight change. Appetite normal.   Head: no headache   Eye: no vision change/ loss of peripheral vision.   ENT: No throat congestion.   Respiratory: no cough   Cardiovascular: no chest pain or tachycardia  Gastrointestinal: Negative for vomiting, abdominal pain and diarrhea.  Genitourinary: Hematuria+.  Musculoskeletal: Negative for myalgias. No weakness.  Neurological: Negative for seizures and headaches.  Psychiatric/Behavioral: Negative for behavioral problem and dysphoric mood.    Past Medical History:   Diagnosis Date     Calculus of kidney      Esophageal reflux      GERD (gastroesophageal reflux disease)      Hyperlipidemia LDL goal <130 5/9/2010     Malignant melanoma of skin of trunk, except scrotum (H)      Nonspecific abnormal finding     has living will 2004 -      Nontoxic multinodular goiter     no further eval /tx rec per pt     Osteopenia      Other psoriasis      Personal history of colonic polyps      PMR (polymyalgia rheumatica) (H)      Stress-induced cardiomyopathy      Undiagnosed cardiac murmurs      Unspecified constipation      Unspecified essential hypertension         Past Surgical History:   Procedure Laterality Date     BIOPSY       C NONSPECIFIC PROCEDURE  2005    colonoscopy polyp repeat 2010     COLONOSCOPY  2014     COMBINED CYSTOSCOPY, RETROGRADES, URETEROSCOPY, INSERT STENT Bilateral 4/3/2018    Procedure: COMBINED CYSTOSCOPY, RETROGRADES, URETEROSCOPY, INSERT STENT;;  Surgeon: Stuart King MD;  Location: [facility] OR     CYSTOSCOPY, BIOPSY BLADDER INSTILL OPTICAL AGENT N/A 4/3/2018    Procedure: CYSTOSCOPY, BIOPSY BLADDER INSTILL OPTICAL AGENT;  Cystoscopy, Blue Light Cystoscopy, Bladder Biopsies, Bilateral Selective ureteral washings for Cytology, Bilateral Retrograde Pyelograms, Bilateral Ureteroscopy;  Surgeon: Stuart King MD;  Location: [facility] OR     CYSTOSCOPY, BIOPSY BLADDER, COMBINED N/A 2/19/2018    Procedure: COMBINED CYSTOSCOPY, BIOPSY BLADDER;  Cystoscopy, Bladder Biopsy;  Surgeon: Kenna La MD;  Location: [facility] OR     ENDOSCOPIC ULTRASOUND LOWER GASTROINTESTIONAL TRACT (GI) N/A 10/30/2015    Procedure: ENDOSCOPIC ULTRASOUND LOWER GASTROINTESTIONAL TRACT (GI);  Surgeon: Daniel Jean-Baptiste MD;  Location: [facility] OR     EYE SURGERY  12/4/17     LAPAROSCOPIC CHOLECYSTECTOMY WITH CHOLANGIOGRAMS N/A 11/1/2015    Procedure: LAPAROSCOPIC CHOLECYSTECTOMY WITH CHOLANGIOGRAMS;  Surgeon: Tonie Warren MD;  Location: [facility] OR     SURGICAL HISTORY OF -   1996    malignant melanoma     SURGICAL HISTORY OF -   1968    thyroid nodule     SURGICAL HISTORY OF -       D & C       Family History   Problem Relation Age of Onset     CANCER Father      dec - esophageal and laryngeal     HEART DISEASE Mother      Respiratory Mother      dec     Breast Cancer Daughter      Other Cancer Daughter      Thyroid Disease Daughter      Asthma Daughter      Hyperlipidemia Son      DIABETES Son        Social History     Social History     Marital status:      Spouse name:      Number of children: 2     Years of education: N/A     Occupational  History      Retired     Social History Main Topics     Smoking status: Never Smoker     Smokeless tobacco: Never Used     Alcohol use No      Comment: 6 times per year-one drink     Drug use: No     Sexual activity: Yes     Partners: Male                                              Social History Narrative    December 7, 2009    Balanced Diet - Yes    Osteoporosis Prevention Measures - Dairy servings per day: 2 and Medication/Supplements (See current meds)    Regular Exercise -  Yes Describe curves, walking    Dental Exam - YES - Date: 10/2009    Eye Exam - YES - Date: 2008    Self Breast Exam - Yes    Abuse: Current or Past (Physical, Sexual or Emotional)- No    Do you feel safe in your environment - Yes    Guns stored in the home - No    Sunscreen used - Yes    Seatbelts used - Yes    Lipids -  YES - Date: 11/24/2009    Glucose -  YES - Date: 11/24/2009    Colon Cancer Screening - Colonoscopy 11/18/2005(date completed)    Hemoccults - YES - Date: 10/12/2004    Pap Test -  YES - Date: 09/22/2004    Do you have any concerns about STD's -  No    Mammography - YES - Date: 11/24/2009    DEXA - YES - Date: 11/20/2008    Immunizations reviewed and up to date - Yes    PAULETTE Spencer CMA                 EXAMINATION: Thyroid ultrasound, 12/18/2017 1:49 PM       COMPARISON: Chest CT 12/13/2017, thyroid ultrasound 10/26/2006      HISTORY: The referring physician requested comparison to I123 scan -  to be done prior to this U/S Multinodular goiter; however the patient  deferred the nuclear medicine exam secondary to recent myocardial  infarction.      Technique: Grayscale and color ultrasound imaging of the thyroid was  performed.      Findings:    The thyroid is enlarged and diffusely heterogeneous with the  parenchyma composed of innumerable nodules, predominantly solid with  some cystic changes well. There are coarse, dystrophic appearing  calcifications on the left as well as punctate echogenic foci, many of  which  demonstrate ring down/comet tail artifact, likely inspissated  colloid, with more pronounced associated cysts seen on prior  ultrasound of 10/2006 in the left lobe of the thyroid gland. There are  additional punctate echogenic foci in both lobes which may represent  microcalcifications. Multiple nodules demonstrate hypervascularity.      The right lobe of the thyroid measures: 4.4 x 3.3 x 5.1 cm.       The thyroid isthmus measures: 0.6 cm.      The left lobe of the thyroid measures: 2.4 x 3.3 x 5.3 cm.           Impression:  Enlarged heterogeneous thyroid gland composed of innumerable nodules,  predominantly solid with some cystic changes well. Coarse, dystrophic  appearing calcifications on the left as well as punctate echogenic  foci bilaterally, likely inspissated colloid with possible  microcalcifications. Comparison to I-123 scan was requested however  the patient declined that exam.      I have personally reviewed the examination and initial interpretation  and I agree with the findings.      KASANDRA WALKER MD          Objective:   There were no vitals taken for this visit.    General: well developed, well nourished, in NAD  Eyes: anicteric, normal extra-occular movements, no lid lag, no stare  HEENT: MMM, oropharynx clear, no LAD, thyroid diffusely enlarged to twice its size.    CV: RRR, normal S1/S2, no MRG  Pulm: CTAB, no wheezes or crackles  Abd: soft, non tender, non distended, NABS, no HSM  Neuro: alert, CNII-XII intact, strength 5/5 in all 4 extremities, sensation intact to light touch, DTR- mildly exaggerated  Extremities: no LE edema, UE tremor+  Skin: No lesions noted, normal texture    In House Labs:   Lab Results   Component Value Date    A1C 6.2 12/14/2017    A1C Canceled, Test credited 12/14/2017    A1C 5.8 02/24/2016    A1C 6.4 11/18/2015    A1C 5.9 03/08/2012       TSH   Date Value Ref Range Status   03/29/2018 2.72 0.40 - 4.00 mU/L Final   03/14/2018 1.94 0.40 - 4.00 mU/L Final   02/14/2018  1.86 0.40 - 4.00 mU/L Final   02/07/2018 1.54 0.40 - 4.00 mU/L Final   01/27/2018 0.10 (L) 0.40 - 4.00 mU/L Final     T4 Free   Date Value Ref Range Status   03/29/2018 0.69 (L) 0.76 - 1.46 ng/dL Final   02/14/2018 0.76 0.76 - 1.46 ng/dL Final   02/07/2018 0.68 (L) 0.76 - 1.46 ng/dL Final   01/27/2018 0.87 0.76 - 1.46 ng/dL Final   01/16/2018 1.41 0.76 - 1.46 ng/dL Final       Creatinine   Date Value Ref Range Status   05/21/2018 0.85 0.52 - 1.04 mg/dL Final   ]    Recent Labs   Lab Test  12/14/17   0722  09/25/17   0914   04/28/15   0934  02/18/14   0925   CHOL  126  183   < >  163  170   HDL  44*  57   < >  64  46*   LDL  56  95   < >  67  94   TRIG  134  155*   < >  161*  150   CHOLHDLRATIO   --    --    --   2.5  3.7    < > = values in this interval not displayed.           Assessment/Treatment Plan:      Dimple Oviedo is a 84 year old year old female with hyperthyroidism, on treatment since late last year.   The cause of her hyperthyroidism is unclear and could be any of the following  1. MNG with an autonomous nodule- given her age, enlarged thyroid and the results of the USG, an autonomous nodule in an MNG is a likely cause.   2. Grave's disease- in keeping with her findings of a diffusely enlarged thyroid causing hyperthyroidism. TSI done before was on the upper level of normal and was therefore not a useful marker of presence of absence of the condition.   3. Thyroiditis- As she was largely asymptomatic prior to last year, it is possible that a transient thyroiditis was the cause although the time period of her hyperthyroid phase is longer than typical.   4. Iodine induced hyperthyroidism- the patient had a large load of Iodine given to her in December which may be the cause or a contributor to her condition.     The way to differentiate between these conditions would be to use a radioiodide uptake and scan. However, early this year it was not possible to obtain this as she had had an iodine load in  12/17.   The plan is to recheck her TFT today as it has been > 6 weeks since her last check. A TSI to can be checked with this blood draw to help us decide whether Grave's disease is the cause, given her inconclusive value previously.  It is also planned to stop her medication and recheck her thyroid function in around 2 weeks after the cessation.  -If the patient is hyperthyroid after cessation, we will perform a scan after radioiodide uptake. In the event of the scan showing f/s/o Grave's disease or an MNG with an autonomous nodule, we will plan for LUNA ablation(differing doses depending upon her condition)  as it is simple, effective, permanent and supplementation with thyroid hormone from possibly resulting hypothyroidism is safer and easier to manage than lifelong treatment with Methimazole.   - if the patient is euthyroid after cessation, we can wait and watch as it was likely a transient hyperthyroidism due to thyroiditis or iodine.     Tori Mckeon  Student, Endocrinology  Answers for HPI/ROS submitted by the patient on 5/18/2018   General Symptoms: Yes  Skin Symptoms: Yes  HENT Symptoms: Yes  EYE SYMPTOMS: Yes  HEART SYMPTOMS: Yes  LUNG SYMPTOMS: Yes  INTESTINAL SYMPTOMS: Yes  URINARY SYMPTOMS: Yes  GYNECOLOGIC SYMPTOMS: Yes  BREAST SYMPTOMS: No  SKELETAL SYMPTOMS: Yes  BLOOD SYMPTOMS: No  NERVOUS SYSTEM SYMPTOMS: Yes  MENTAL HEALTH SYMPTOMS: Yes  Fever: No  Loss of appetite: No  Weight loss: No  Weight gain: No  Fatigue: Yes  Night sweats: No  Chills: Yes  Increased stress: Yes  Excessive hunger: Yes  Excessive thirst: No  Feeling hot or cold when others believe the temperature is normal: Yes  Loss of height: Yes  Post-operative complications: No  Surgical site pain: No  Hallucinations: No  Change in or Loss of Energy: Yes  Hyperactivity: Yes  Confusion: No  Changes in hair: Yes  Changes in moles/birth marks: No  Itching: No  Rashes: No  Changes in nails: Yes  Acne: No  Hair in places you don't want  it: No  Change in facial hair: No  Warts: No  Non-healing sores: No  Scarring: No  Flaking of skin: No  Color changes of hands/feet in cold : No  Sun sensitivity: No  Skin thickening: No  Ear pain: No  Ear discharge: No  Hearing loss: Yes  Tinnitus: No  Nosebleeds: Yes  Congestion: No  Sinus pain: No  Trouble swallowing: No   Voice hoarseness: Yes  Mouth sores: No  Sore throat: No  Tooth pain: No  Gum tenderness: No  Bleeding gums: No  Change in taste: Yes  Change in sense of smell: Yes  Dry mouth: Yes  Hearing aid used: Yes  Neck lump: No  Eye pain: No  Vision loss: Yes  Dry eyes: Yes  Watery eyes: No  Eye bulging: No  Double vision: No  Flashing of lights: No  Spots: No  Floaters: Yes  Redness: No  Crossed eyes: No  Tunnel Vision: No  Yellowing of eyes: No  Eye irritation: No  Cough: No  Sputum or phlegm: No  Coughing up blood: No  Difficulty breating or shortness of breath: Yes  Snoring: No  Wheezing: No  Difficulty breathing on exertion: Yes  Nighttime Cough: No  Difficulty breathing when lying flat: No  Chest pain or pressure: No  Fast or irregular heartbeat: No  Pain in legs with walking: No  Trouble breathing while lying down: No  Fingers or toes appear blue: No  High blood pressure: Yes  Low blood pressure: Yes  Fainting: No  Murmurs: Yes  Pacemaker: No  Varicose veins: No  Edema or swelling: No  Wake up at night with shortness of breath: No  Light-headedness: Yes  Exercise intolerance: Yes  Heart burn or indigestion: No  Nausea: Yes  Vomiting: No  Abdominal pain: No  Bloating: No  Constipation: Yes  Diarrhea: Yes  Blood in stool: No  Black stools: No  Rectal or Anal pain: No  Fecal incontinence: Yes  Yellowing of skin or eyes: No  Vomit with blood: No  Change in stools: Yes  Trouble holding urine or incontinence: Yes  Pain or burning: No  Trouble starting or stopping: Yes  Increased frequency of urination: Yes  Blood in urine: Yes  Decreased frequency of urination: No  Frequent nighttime urination:  Yes  Flank pain: No  Difficulty emptying bladder: Yes  Back pain: Yes  Muscle aches: No  Neck pain: Yes  Swollen joints: No  Joint pain: Yes  Bone pain: No  Muscle cramps: Yes  Muscle weakness: No  Joint stiffness: Yes  Bone fracture: No  Trouble with coordination: No  Dizziness or trouble with balance: Yes  Fainting or black-out spells: No  Memory loss: No  Headache: No  Seizures: No  Speech problems: No  Tingling: No  Tremor: Yes  Weakness: Yes  Difficulty walking: Yes  Paralysis: No  Numbness: No  Bleeding or spotting between periods: No  Heavy or painful periods: No  Irregular periods: No  Vaginal discharge: No  Hot flashes: Yes  Vaginal dryness: Yes  Genital ulcers: No  Reduced libido: Yes  Painful intercourse: No  Post-menopausal bleeding: No  Nervous or Anxious: Yes  Depression: Yes  Trouble sleeping: Yes  Trouble thinking or concentrating: No  Mood changes: Yes  Panic attacks: Yes        Physician Attestation   I, Dhara Meza, was present with the medical student who participated in the service and in the documentation of the note.  I have verified the history and personally performed the physical exam and medical decision making.  I agree with the assessment and plan of care as documented in the note.      Items personally reviewed: vitals, labs, imaging.  On my exam she has no tremor today, thyroid is twice normal in size w/o distinct nodules. .    Dhara Meza MD

## 2018-05-29 NOTE — PATIENT INSTRUCTIONS
Stop methimazole    Continue propranolol.    In a couple of weeks we will repeat the thyroid tests.  If they show you have hyperthyroidism again, I will recommend that you do a radioactive iodine scan and uptake test.  If this test shows you have a nodule that is making too much thyroid medication or have Graves disease, we will then plan to treat you with a higher dose of radioactive iodine.    If the repeat blood tests are normal, we will continue to follow them every 2-4 weeks for awhile.

## 2018-05-29 NOTE — MR AVS SNAPSHOT
After Visit Summary   5/29/2018    Dimple Oviedo    MRN: [id number]           Patient Information     Date Of Birth          6/23/1933        Visit Information        Provider Department      5/29/2018 11:00 AM Dhara Meza MD [facility] Endocrinology        Today's Diagnoses     Hyperthyroidism    -  1      Care Instructions    Stop methimazole    Continue propranolol.    In a couple of weeks we will repeat the thyroid tests.  If they show you have hyperthyroidism again, I will recommend that you do a radioactive iodine scan and uptake test.  If this test shows you have a nodule that is making too much thyroid medication or have Graves disease, we will then plan to treat you with a higher dose of radioactive iodine.    If the repeat blood tests are normal, we will continue to follow them every 2-4 weeks for awhile.          Follow-ups after your visit        Your next 10 appointments already scheduled     May 29, 2018 12:00 PM CDT   LAB with  LAB    Health Lab ([facility])    [address] [place] [address]  1st Floor  [facility] [address] [phone]           Please do not eat 10-12 hours before your appointment if you are coming in fasting for labs on lipids, cholesterol, or glucose (sugar). This does not apply to pregnant women. Water, hot tea and black coffee (with nothing added) are okay. Do not drink other fluids, diet soda or chew gum.            Jun 11, 2018 11:00 AM CDT   ULTRASOUND with [facility] ULTRASOUND   [facility] ([facility])    [facility] [address]  [address]  [address]  [facility] [address]   [phone]            Jul 05, 2018  1:30 PM CDT   (Arrive by 1:15 PM)   NEW ARRHYTHMIA with Butch Griffith MD   [facility] Heart Care ([facility])    [address] [facility]  [address]  [facility] [address] [phone]            Jul 26, 2018  2:20 PM CDT   (Arrive by  "2:05 PM)   Return Visit with Stuart King MD   [facility] Urology and [facility] for Prostate and Urologic Cancers ([facility])    [address] [facility]  [address] [facility] [address] [phone]              Future tests that were ordered for you today     Open Future Orders        Priority Expected Expires Ordered    TSH Routine 5/29/2018 5/31/2018 5/29/2018    T3 total Routine 5/29/2018 5/31/2018 5/29/2018    T4 free Routine 5/29/2018 5/31/2018 5/29/2018    Thyroid stimulating immunoglobulin Routine 5/29/2018 5/31/2018 5/29/2018            Who to contact     Please call your clinic at [phone] to:    Ask questions about your health    Make or cancel appointments    Discuss your medicines    Learn about your test results    Speak to your doctor            Additional Information About Your Visit        [employer] Information     [employer] gives you secure access to your electronic health record. If you see a primary care provider, you can also send messages to your care team and make appointments. If you have questions, please call your primary care clinic.  If you do not have a primary care provider, please call [phone] and they will assist you.      [employer] is an electronic gateway that provides easy, online access to your medical records. With [employer], you can request a clinic appointment, read your test results, renew a prescription or communicate with your care team.     To access your existing account, please contact your [facility] or call [phone] for assistance.        Care EveryWhere ID     This is your Care EveryWhere ID. This could be used by other organizations to access your [place] medical records  [id number]        Your Vitals Were     Pulse Height BMI (Body Mass Index)             77 1.448 m (4' 9.01\") 33.32 kg/m2          Blood Pressure from Last 3 Encounters:   05/29/18 139/68   05/25/18 126/53   05/22/18 148/74    " Weight from Last 3 Encounters:   05/29/18 69.9 kg (154 lb)   05/25/18 70 kg (154 lb 4 oz)   05/22/18 69.5 kg (153 lb 3.2 oz)                 Today's Medication Changes          These changes are accurate as of 5/29/18 11:47 AM.  If you have any questions, ask your nurse or doctor.               These medicines have changed or have updated prescriptions.        Dose/Directions    irbesartan 300 MG tablet   Commonly known as:  AVAPRO   This may have changed:    - how much to take  - how to take this  - when to take this  - additional instructions   Used for:  Essential hypertension with goal blood pressure less than 140/90        TAKE 1 TABLET EVERY DAY   Quantity:  90 tablet   Refills:  2       methimazole 5 MG tablet   Commonly known as:  TAPAZOLE   This may have changed:  when to take this   Used for:  Nontoxic multinodular goiter        Dose:  5 mg   Take 1 tablet (5 mg) by mouth daily   Quantity:  90 tablet   Refills:  1       omeprazole 20 MG CR capsule   Commonly known as:  priLOSEC   This may have changed:  when to take this   Used for:  Gastroesophageal reflux disease without esophagitis        Dose:  20 mg   Take 1 capsule (20 mg) by mouth daily   Quantity:  180 capsule   Refills:  3       propranolol 60 MG 24 hr capsule   Commonly known as:  INDERAL LA   This may have changed:  when to take this   Used for:  Nontoxic multinodular goiter        Dose:  60 mg   Take 1 capsule (60 mg) by mouth daily   Quantity:  90 capsule   Refills:  2       sertraline 100 MG tablet   Commonly known as:  ZOLOFT   This may have changed:  when to take this   Used for:  THERON (generalized anxiety disorder)        Dose:  100 mg   Take 1 tablet (100 mg) by mouth daily   Quantity:  90 tablet   Refills:  1                Primary Care Provider Office Phone # Fax #    Pop Zapien [phone] [address] [place] [address]        Equal Access to Services     GUNNER RODRIGUEZ AH: Aleja wu  "Ranjeet, costada luqcharmaine, andrewybta kagus chávez, maldonado alejandre kelseagordy lamaryabdias sotero. So [facility] [phone].    ATENCIÓN: Si soni alanis, tiene a sierra disposición servicios gratuitos de asistencia lingüística. Al al [phone].    We comply with applicable federal civil rights laws and [place] laws. We do not discriminate on the basis of race, color, national origin, age, disability, sex, sexual orientation, or gender identity.            Thank you!     Thank you for choosing [facility]  for your care. Our goal is always to provide you with excellent care. Hearing back from our patients is one way we can continue to improve our services. Please take a few minutes to complete the written survey that you may receive in the mail after your visit with us. Thank you!             Your Updated Medication List - Protect others around you: Learn how to safely use, store and throw away your medicines at www.disposemymeds.org.          This list is accurate as of 5/29/18 11:47 AM.  Always use your most recent med list.                   Brand Name Dispense Instructions for use Diagnosis    acetaminophen 650 MG 8 hour tablet     100 tablet    Take 650 mg by mouth every 8 hours as needed for mild pain or fever    Ascending cholangitis       alendronate 70 MG tablet    FOSAMAX    12 tablet    TAKE 1 TABLET EVERY 7 DAYS AT LEAST 60 MINUTES BEFORE BREAKFAST AS DIRECTED \"SEE PACKAGE FOR ADDITIONAL INSTRUCTIONS\"    High risk medication use, Osteopenia       ASPIRIN PO      Take 81 mg by mouth At Bedtime        CALCIUM + D PO      Take by mouth 2 times daily        colestipol 1 g tablet    COLESTID     TK 1 T PO BID - TAKE OTHER MEDS 1 HOUR BEFORE OR 4 HOURS AFTER COLESID        CRANBERRY CONCENTRATE PO      Take 2 capsules by mouth At Bedtime        cycloSPORINE 0.05 % ophthalmic emulsion    RESTASIS     Place 1 drop into both eyes 2 times daily        diazepam 2 MG tablet    VALIUM    20 tablet    Take 1 " tablet (2 mg) by mouth every 12 hours as needed for anxiety or sleep    THERON (generalized anxiety disorder)       Fish Oil 500 MG Caps      Take 1 capsule by mouth 2 times daily        furosemide 20 MG tablet    LASIX          irbesartan 300 MG tablet    AVAPRO    90 tablet    TAKE 1 TABLET EVERY DAY    Essential hypertension with goal blood pressure less than 140/90       lovastatin 40 MG tablet    MEVACOR    90 tablet    TAKE 1 TABLET AT BEDTIME (HYPERLIPIDEMIA LDL GOAL  BELOW 130)    Hyperlipidemia LDL goal <130       Melatonin 10 MG Tabs tablet      Take 10 mg by mouth nightly as needed for sleep        METAMUCIL FIBER PO      Take by mouth 3 times daily        methimazole 5 MG tablet    TAPAZOLE    90 tablet    Take 1 tablet (5 mg) by mouth daily    Nontoxic multinodular goiter       nitroGLYcerin 0.4 MG sublingual tablet    NITROSTAT    25 tablet    For chest pain place 1 tablet under the tongue every 5 minutes for 3 doses. If symptoms persist 5 minutes after 1st dose call 911.    Elevated troponin       omeprazole 20 MG CR capsule    priLOSEC    180 capsule    Take 1 capsule (20 mg) by mouth daily    Gastroesophageal reflux disease without esophagitis       propranolol 60 MG 24 hr capsule    INDERAL LA    90 capsule    Take 1 capsule (60 mg) by mouth daily    Nontoxic multinodular goiter       sertraline 100 MG tablet    ZOLOFT    90 tablet    Take 1 tablet (100 mg) by mouth daily    THERON (generalized anxiety disorder)       SYSTANE 0.4-0.3 % Soln ophthalmic solution   Generic drug:  polyethylene glycol 0.4%- propylene glycol 0.3%      Place 1 drop into both eyes 3 times daily as needed for dry eyes        traZODone 50 MG tablet    DESYREL    90 tablet    TAKE 1 TABLET(50 MG) BY MOUTH EVERY NIGHT AS NEEDED FOR SLEEP    Insomnia, unspecified type

## 2018-06-01 DIAGNOSIS — E05.90 HYPERTHYROIDISM: Primary | ICD-10-CM

## 2018-06-01 LAB — TSI SER-ACNC: 3.7 TSI INDEX

## 2018-06-07 DIAGNOSIS — I50.22 CHRONIC SYSTOLIC HEART FAILURE (H): Primary | ICD-10-CM

## 2018-06-07 DIAGNOSIS — I48.91 ATRIAL FIBRILLATION (H): ICD-10-CM

## 2018-06-11 ENCOUNTER — OFFICE VISIT (OUTPATIENT)
Dept: OBGYN | Facility: CLINIC | Age: 83
End: 2018-06-11
Attending: OBSTETRICS & GYNECOLOGY
Payer: MEDICARE

## 2018-06-11 DIAGNOSIS — E05.90 HYPERTHYROIDISM: ICD-10-CM

## 2018-06-11 DIAGNOSIS — N95.0 POSTMENOPAUSAL BLEEDING: Primary | ICD-10-CM

## 2018-06-11 LAB — T3 SERPL-MCNC: 134 NG/DL (ref 60–181)

## 2018-06-11 PROCEDURE — 84439 ASSAY OF FREE THYROXINE: CPT | Performed by: INTERNAL MEDICINE

## 2018-06-11 PROCEDURE — 76830 TRANSVAGINAL US NON-OB: CPT | Mod: ZF

## 2018-06-11 PROCEDURE — 84443 ASSAY THYROID STIM HORMONE: CPT | Performed by: INTERNAL MEDICINE

## 2018-06-11 PROCEDURE — 36415 COLL VENOUS BLD VENIPUNCTURE: CPT | Performed by: INTERNAL MEDICINE

## 2018-06-11 PROCEDURE — 84480 ASSAY TRIIODOTHYRONINE (T3): CPT | Performed by: INTERNAL MEDICINE

## 2018-06-11 NOTE — MR AVS SNAPSHOT
After Visit Summary   6/11/2018    Dimple Oviedo    MRN: [id number]           Patient Information     Date Of Birth          6/23/1933        Visit Information        Provider Department      6/11/2018 11:00 AM [facility] ULTRASOUND [facility]        Today's Diagnoses     Postmenopausal bleeding    -  1       Follow-ups after your visit        Your next 10 appointments already scheduled     Jun 11, 2018  3:00 PM CDT   LAB with  LAB   [facility] ([facility])    [address] [place] [address] [phone]           Please do not eat 10-12 hours before your appointment if you are coming in fasting for labs on lipids, cholesterol, or glucose (sugar). This does not apply to pregnant women. Water, hot tea and black coffee (with nothing added) are okay. Do not drink other fluids, diet soda or chew gum.            Jul 05, 2018  1:30 PM CDT   (Arrive by 1:15 PM)   NEW ARRHYTHMIA with Butch Griffith MD   [facility] Heart Care ([facility])    [address] [place] [id number] [phone]            Jul 26, 2018  2:20 PM CDT   (Arrive by 2:05 PM)   Return Visit with Stuart King MD   [facility] Urology and [facility] for Prostate and Urologic Cancers ([facility])    [address] [place] [id number] [phone]              Who to contact     Please call your clinic at [phone] to:    Ask questions about your health    Make or cancel appointments    Discuss your medicines    Learn about your test results    Speak to your doctor            Additional Information About Your Visit        MyChart Information     [employer] gives you secure access to your electronic health record. If you see a primary care provider, you can also send messages to your care team and make appointments. If you have questions, please call your primary care  clinic.  If you do not have a primary care provider, please call [phone] and they will assist you.      [employer] is an electronic gateway that provides easy, online access to your medical records. With [employer], you can request a clinic appointment, read your test results, renew a prescription or communicate with your care team.     To access your existing account, please contact your [facility] or call [phone] for assistance.        Care EveryWhere ID     This is your Care EveryWhere ID. This could be used by other organizations to access your [place] medical records  [id number]         Blood Pressure from Last 3 Encounters:   05/29/18 139/68   05/25/18 126/53   05/22/18 148/74    Weight from Last 3 Encounters:   05/29/18 69.9 kg (154 lb)   05/25/18 70 kg (154 lb 4 oz)   05/22/18 69.5 kg (153 lb 3.2 oz)                 Today's Medication Changes          These changes are accurate as of 6/11/18  1:27 PM.  If you have any questions, ask your nurse or doctor.               These medicines have changed or have updated prescriptions.        Dose/Directions    irbesartan 300 MG tablet   Commonly known as:  AVAPRO   This may have changed:    - how much to take  - how to take this  - when to take this  - additional instructions   Used for:  Essential hypertension with goal blood pressure less than 140/90        TAKE 1 TABLET EVERY DAY   Quantity:  90 tablet   Refills:  2       methimazole 5 MG tablet   Commonly known as:  TAPAZOLE   This may have changed:  when to take this   Used for:  Nontoxic multinodular goiter        Dose:  5 mg   Take 1 tablet (5 mg) by mouth daily   Quantity:  90 tablet   Refills:  1       omeprazole 20 MG CR capsule   Commonly known as:  priLOSEC   This may have changed:  when to take this   Used for:  Gastroesophageal reflux disease without esophagitis        Dose:  20 mg   Take 1 capsule (20 mg) by mouth daily   Quantity:  180 capsule   Refills:  3        propranolol 60 MG 24 hr capsule   Commonly known as:  INDERAL LA   This may have changed:  when to take this   Used for:  Nontoxic multinodular goiter        Dose:  60 mg   Take 1 capsule (60 mg) by mouth daily   Quantity:  90 capsule   Refills:  2       sertraline 100 MG tablet   Commonly known as:  ZOLOFT   This may have changed:  when to take this   Used for:  THERON (generalized anxiety disorder)        Dose:  100 mg   Take 1 tablet (100 mg) by mouth daily   Quantity:  90 tablet   Refills:  1                Primary Care Provider Office Phone # Fax #    Pop Antonino Zapien [phone] [address] [place] [address]        Equal Access to Services     [facility] HospitalELISE[facility] : Aleja Pollack, nadia barnard, henrique chávez, maldonado bey. So [facility] [phone].    ATENCIÓN: Si habla español, tiene a sierra disposición servicios gratuitos de asistencia lingüística. Llame al [phone].    We comply with applicable federal civil rights laws and [place] laws. We do not discriminate on the basis of race, color, national origin, age, disability, sex, sexual orientation, or gender identity.            Thank you!     Thank you for choosing [facility]  for your care. Our goal is always to provide you with excellent care. Hearing back from our patients is one way we can continue to improve our services. Please take a few minutes to complete the written survey that you may receive in the mail after your visit with us. Thank you!             Your Updated Medication List - Protect others around you: Learn how to safely use, store and throw away your medicines at www.disposemymeds.org.          This list is accurate as of 6/11/18  1:27 PM.  Always use your most recent med list.                   Brand Name Dispense Instructions for use Diagnosis    acetaminophen 650 MG 8 hour tablet     100 tablet    Take 650 mg by mouth every 8  "hours as needed for mild pain or fever    Ascending cholangitis       alendronate 70 MG tablet    FOSAMAX    12 tablet    TAKE 1 TABLET EVERY 7 DAYS AT LEAST 60 MINUTES BEFORE BREAKFAST AS DIRECTED \"SEE PACKAGE FOR ADDITIONAL INSTRUCTIONS\"    High risk medication use, Osteopenia       ASPIRIN PO      Take 81 mg by mouth At Bedtime        CALCIUM + D PO      Take by mouth 2 times daily        colestipol 1 g tablet    COLESTID     TK 1 T PO BID - TAKE OTHER MEDS 1 HOUR BEFORE OR 4 HOURS AFTER COLESID        CRANBERRY CONCENTRATE PO      Take 2 capsules by mouth At Bedtime        cycloSPORINE 0.05 % ophthalmic emulsion    RESTASIS     Place 1 drop into both eyes 2 times daily        diazepam 2 MG tablet    VALIUM    20 tablet    Take 1 tablet (2 mg) by mouth every 12 hours as needed for anxiety or sleep    THERON (generalized anxiety disorder)       Fish Oil 500 MG Caps      Take 1 capsule by mouth 2 times daily        furosemide 20 MG tablet    LASIX          irbesartan 300 MG tablet    AVAPRO    90 tablet    TAKE 1 TABLET EVERY DAY    Essential hypertension with goal blood pressure less than 140/90       lovastatin 40 MG tablet    MEVACOR    90 tablet    TAKE 1 TABLET AT BEDTIME (HYPERLIPIDEMIA LDL GOAL  BELOW 130)    Hyperlipidemia LDL goal <130       Melatonin 10 MG Tabs tablet      Take 10 mg by mouth nightly as needed for sleep        METAMUCIL FIBER PO      Take by mouth 3 times daily        methimazole 5 MG tablet    TAPAZOLE    90 tablet    Take 1 tablet (5 mg) by mouth daily    Nontoxic multinodular goiter       nitroGLYcerin 0.4 MG sublingual tablet    NITROSTAT    25 tablet    For chest pain place 1 tablet under the tongue every 5 minutes for 3 doses. If symptoms persist 5 minutes after 1st dose call 911.    Elevated troponin       omeprazole 20 MG CR capsule    priLOSEC    180 capsule    Take 1 capsule (20 mg) by mouth daily    Gastroesophageal reflux disease without esophagitis       propranolol 60 MG 24 hr " capsule    INDERAL LA    90 capsule    Take 1 capsule (60 mg) by mouth daily    Nontoxic multinodular goiter       sertraline 100 MG tablet    ZOLOFT    90 tablet    Take 1 tablet (100 mg) by mouth daily    THERON (generalized anxiety disorder)       SYSTANE 0.4-0.3 % Soln ophthalmic solution   Generic drug:  polyethylene glycol 0.4%- propylene glycol 0.3%      Place 1 drop into both eyes 3 times daily as needed for dry eyes        traZODone 50 MG tablet    DESYREL    90 tablet    TAKE 1 TABLET(50 MG) BY MOUTH EVERY NIGHT AS NEEDED FOR SLEEP    Insomnia, unspecified type

## 2018-06-11 NOTE — LETTER
6/11/2018       RE: Dimple Oviedo  [address]  [facility] [address]     Dear Colleague,    Thank you for referring your patient, Dimple Oviedo, to the [facility] at [facility]. Please see a copy of my visit note below.    84 year old female  presents for gynecologic ultrasound indicated by postmenopausal bleeding.  This study was done transvaginally.    Uterine findings:   Presence: Visible Size: Normal 2.8x2.9x1.9 cm.  Endometrium = 2.6 mm.   Cx length = 19.7 mm.      Flexion:  Anteverted Position: Midline Margins: Smooth Shape: Normal   Contour: Regular Texture: Heterogeneous Cavity: small amount of fluid in cavity Masses: Normal    Pelvic findings:    Right Adnexa: Normal   Left Adnexa: Normal   Bladder:  Normal         Cul - de - sac fluid: None    Ovarian follicles:   Right ovary:  Not visualized        Left ovary:  Not visualized                 Comments: Thin endometrial stripe with a small amount of fluid noted in the cavity, otherwise normal pelvic ultrasound.    TRI Ferrari MD, FACOG      Again, thank you for allowing me to participate in the care of your patient.      Sincerely,    [facility] Ultrasound

## 2018-06-11 NOTE — LETTER
6/11/2018      RE: Dimple Oviedo  [address]  [facility] [address]       84 year old female  presents for gynecologic ultrasound indicated by postmenopausal bleeding.  This study was done transvaginally.    Uterine findings:   Presence: Visible Size: Normal 2.8x2.9x1.9 cm.  Endometrium = 2.6 mm.   Cx length = 19.7 mm.      Flexion:  Anteverted Position: Midline Margins: Smooth Shape: Normal   Contour: Regular Texture: Heterogeneous Cavity: small amount of fluid in cavity Masses: Normal    Pelvic findings:    Right Adnexa: Normal   Left Adnexa: Normal   Bladder:  Normal         Cul - de - sac fluid: None    Ovarian follicles:   Right ovary:  Not visualized        Left ovary:  Not visualized                 Comments: Thin endometrial stripe with a small amount of fluid noted in the cavity, otherwise normal pelvic ultrasound.    TRI Ferrari MD, FACOG      S Ultrasound

## 2018-06-11 NOTE — PROGRESS NOTES
84 year old female  presents for gynecologic ultrasound indicated by postmenopausal bleeding.  This study was done transvaginally.    Uterine findings:   Presence: Visible Size: Normal 2.8x2.9x1.9 cm.  Endometrium = 2.6 mm.   Cx length = 19.7 mm.      Flexion:  Anteverted Position: Midline Margins: Smooth Shape: Normal   Contour: Regular Texture: Heterogeneous Cavity: small amount of fluid in cavity Masses: Normal    Pelvic findings:    Right Adnexa: Normal   Left Adnexa: Normal   Bladder:  Normal         Cul - de - sac fluid: None    Ovarian follicles:   Right ovary:  Not visualized        Left ovary:  Not visualized                 Comments: Thin endometrial stripe with a small amount of fluid noted in the cavity, otherwise normal pelvic ultrasound.    TRI Ferrari MD, FACOG

## 2018-06-12 ENCOUNTER — TELEPHONE (OUTPATIENT)
Dept: FAMILY MEDICINE | Facility: CLINIC | Age: 83
End: 2018-06-12

## 2018-06-12 DIAGNOSIS — E04.2 NONTOXIC MULTINODULAR GOITER: ICD-10-CM

## 2018-06-12 DIAGNOSIS — I48.91 ATRIAL FIBRILLATION (H): ICD-10-CM

## 2018-06-12 LAB
T4 FREE SERPL-MCNC: 1.22 NG/DL (ref 0.76–1.46)
TSH SERPL DL<=0.005 MIU/L-ACNC: 0.02 MU/L (ref 0.4–4)

## 2018-06-12 RX ORDER — PROPRANOLOL HCL 60 MG
60 CAPSULE, EXTENDED RELEASE 24HR ORAL DAILY
Qty: 90 CAPSULE | Refills: 1 | Status: SHIPPED | OUTPATIENT
Start: 2018-06-12 | End: 2018-12-10

## 2018-06-13 ENCOUNTER — TELEPHONE (OUTPATIENT)
Dept: ENDOCRINOLOGY | Facility: CLINIC | Age: 83
End: 2018-06-13

## 2018-06-13 DIAGNOSIS — E05.90 HYPERTHYROIDISM: Primary | ICD-10-CM

## 2018-06-13 NOTE — TELEPHONE ENCOUNTER
Called patient to review recent thyroid function tests.  She has been off methimazole since 5/29.  Doesn't have any change in tremor, palpitations.  Labs show TSH is below lower limit but T4 and T3 are in normal range.  With detectable TSI last month, I think she may have Graves disease and off methimazole is developing recurrent hyperthyroidism.  However, she is w/o sx and labs are only suggestive of this. Will repeat TFTs in one week and plan for RAIU then if TSH still low.    Janette Meza

## 2018-06-21 ENCOUNTER — OFFICE VISIT (OUTPATIENT)
Dept: FAMILY MEDICINE | Facility: CLINIC | Age: 83
End: 2018-06-21
Payer: MEDICARE

## 2018-06-21 VITALS
OXYGEN SATURATION: 96 % | RESPIRATION RATE: 18 BRPM | BODY MASS INDEX: 33.42 KG/M2 | DIASTOLIC BLOOD PRESSURE: 59 MMHG | WEIGHT: 154.5 LBS | SYSTOLIC BLOOD PRESSURE: 132 MMHG | HEART RATE: 62 BPM | TEMPERATURE: 98.1 F

## 2018-06-21 DIAGNOSIS — I50.30 (HFPEF) HEART FAILURE WITH PRESERVED EJECTION FRACTION (H): ICD-10-CM

## 2018-06-21 DIAGNOSIS — R60.0 BILATERAL LEG EDEMA: Primary | ICD-10-CM

## 2018-06-21 DIAGNOSIS — I87.2 CHRONIC VENOUS INSUFFICIENCY: ICD-10-CM

## 2018-06-21 DIAGNOSIS — E05.90 HYPERTHYROIDISM: ICD-10-CM

## 2018-06-21 LAB — T3 SERPL-MCNC: 212 NG/DL (ref 60–181)

## 2018-06-21 PROCEDURE — 84480 ASSAY TRIIODOTHYRONINE (T3): CPT | Performed by: INTERNAL MEDICINE

## 2018-06-21 PROCEDURE — 84443 ASSAY THYROID STIM HORMONE: CPT | Performed by: FAMILY MEDICINE

## 2018-06-21 PROCEDURE — 84439 ASSAY OF FREE THYROXINE: CPT | Performed by: INTERNAL MEDICINE

## 2018-06-21 PROCEDURE — 36415 COLL VENOUS BLD VENIPUNCTURE: CPT | Performed by: INTERNAL MEDICINE

## 2018-06-21 PROCEDURE — 99214 OFFICE O/P EST MOD 30 MIN: CPT | Performed by: FAMILY MEDICINE

## 2018-06-21 NOTE — PATIENT INSTRUCTIONS
Continue to take the lasix for 2 more days.      Avoid salt.    Elevate your legs (above your heart) for 30 minutes once or twice a day during the afternoon.      Understanding Chronic Venous Insufficiency  Problems with the veins in the legs may lead to chronic venous insufficiency (CVI). CVI means that there is a long-term problem with the veins not being able to pump blood back to your heart. When this happens, blood stays in the legs and causes swelling and aching.   Two problems that may lead to chronic venous insufficiency are:    Damaged valves. Valves keep blood flowing from the legs through the blood vessels and back to the heart. When the valves are damaged, blood does not flow as well.     Deep vein thrombosis (DVT). Blood clots may form in the deep veins of the legs. This may cause pain, redness, and swelling in the legs. It may also block the flow of blood back to the heart. Seek medical care right away if you have these symptoms.    A blood clot in the leg can also break off and travel to the lungs. This is called pulmonary embolism (PE). In the lungs, the clot can cut off the flow of blood. This may cause chest pain, trouble breathing, sweating, a fast heartbeat, coughing (may cough up blood), and fainting. It is a medical emergency and may cause death. Call 911 if you have these symptoms.    Healthcare providers call the two conditions, DVT and PE, venous thromboembolism (VTE).  CVI can t be cured, but you can control leg swelling to reduce the likelihood of ulcers (sores).  Recognizing the symptoms  Be aware of the following:    If you stand or sit with your feet down for long periods, your legs may ache or feel heavy.    Swollen ankles are possibly the most common symptom of CVI.    As swelling increases, the skin over your ankles may show red spots or a brownish tinge. The skin may feel leathery or scaly, and may start to itch.    If swelling is not controlled, an ulcer (open wound) may  form.  What you can do  Reduce your risk of developing ulcers by doing the following:    Increase blood flow back to your heart by elevating your legs, exercising daily, and wearing elastic stockings.    Boost blood flow in your legs by losing excess weight.    If you must stand or sit in one place for a period of time, keep your blood moving by wiggling your toes, shifting your body position, and rising up on the balls of your feet.    Date Last Reviewed: 5/1/2016 2000-2017 [employer]. [address] [place] [address], [place] [address]. All rights reserved. This information is not intended as a substitute for professional medical care. Always follow your healthcare professional's instructions.

## 2018-06-21 NOTE — MR AVS SNAPSHOT
After Visit Summary   6/21/2018    Dimple Oviedo    MRN: [id number]           Patient Information     Date Of Birth          6/23/1933        Visit Information        Provider Department      6/21/2018 11:40 AM Bel Mckeon MD [facility]        Today's Diagnoses     Bilateral leg edema    -  1    Chronic venous insufficiency        (HFpEF) heart failure with preserved ejection fraction (H)          Care Instructions    Continue to take the lasix for 2 more days.      Avoid salt.    Elevate your legs (above your heart) for 30 minutes once or twice a day during the afternoon.      Understanding Chronic Venous Insufficiency  Problems with the veins in the legs may lead to chronic venous insufficiency (CVI). CVI means that there is a long-term problem with the veins not being able to pump blood back to your heart. When this happens, blood stays in the legs and causes swelling and aching.   Two problems that may lead to chronic venous insufficiency are:    Damaged valves. Valves keep blood flowing from the legs through the blood vessels and back to the heart. When the valves are damaged, blood does not flow as well.     Deep vein thrombosis (DVT). Blood clots may form in the deep veins of the legs. This may cause pain, redness, and swelling in the legs. It may also block the flow of blood back to the heart. Seek medical care right away if you have these symptoms.    A blood clot in the leg can also break off and travel to the lungs. This is called pulmonary embolism (PE). In the lungs, the clot can cut off the flow of blood. This may cause chest pain, trouble breathing, sweating, a fast heartbeat, coughing (may cough up blood), and fainting. It is a medical emergency and may cause death. Call 911 if you have these symptoms.    Healthcare providers call the two conditions, DVT and PE, venous thromboembolism (VTE).  CVI can t be cured, but you can control leg swelling to reduce the  likelihood of ulcers (sores).  Recognizing the symptoms  Be aware of the following:    If you stand or sit with your feet down for long periods, your legs may ache or feel heavy.    Swollen ankles are possibly the most common symptom of CVI.    As swelling increases, the skin over your ankles may show red spots or a brownish tinge. The skin may feel leathery or scaly, and may start to itch.    If swelling is not controlled, an ulcer (open wound) may form.  What you can do  Reduce your risk of developing ulcers by doing the following:    Increase blood flow back to your heart by elevating your legs, exercising daily, and wearing elastic stockings.    Boost blood flow in your legs by losing excess weight.    If you must stand or sit in one place for a period of time, keep your blood moving by wiggling your toes, shifting your body position, and rising up on the balls of your feet.    Date Last Reviewed: 5/1/2016 2000-2017 [employer]. [address] [place] [address]. All rights reserved. This information is not intended as a substitute for professional medical care. Always follow your healthcare professional's instructions.                Follow-ups after your visit        Your next 10 appointments already scheduled     Jul 05, 2018  1:30 PM CDT   (Arrive by 1:15 PM)   NEW ARRHYTHMIA with Butch Griffith MD   [facility] ([facility])    [address] [place] [id number] [phone]            Jul 26, 2018  2:20 PM CDT   (Arrive by 2:05 PM)   Return Visit with Stuart King MD   [facility]rolog[facility] ([facility])    [address] [place] [address] [id number] [phone]              Who to contact     If you have questions or need follow up information about today's clinic visit or your schedule please contact [facility]  directly at [phone].  Normal or non-critical lab and imaging results will be communicated to you by [facility], letter or phone within 4 business days after the clinic has received the results. If you do not hear from us within 7 days, please contact the clinic through [facility] or phone. If you have a critical or abnormal lab result, we will notify you by phone as soon as possible.  Submit refill requests through [address] or call your pharmacy and they will forward the refill request to us. Please allow 3 business days for your refill to be completed.          Additional Information About Your Visit        [facility] Information     [address] gives you secure access to your electronic health record. If you see a primary care provider, you can also send messages to your care team and make appointments. If you have questions, please call your primary care clinic.  If you do not have a primary care provider, please call [phone] and they will assist you.        Care EveryWhere ID     This is your Care EveryWhere ID. This could be used by other organizations to access your [facility] medical records  [id number]        Your Vitals Were     Pulse Temperature Respirations Pulse Oximetry BMI (Body Mass Index)       62 98.1  F (36.7  C) (Oral) 18 96% 33.42 kg/m2        Blood Pressure from Last 3 Encounters:   06/21/18 132/59   05/29/18 139/68   05/25/18 126/53    Weight from Last 3 Encounters:   06/21/18 154 lb 8 oz (70.1 kg)   05/29/18 154 lb (69.9 kg)   05/25/18 154 lb 4 oz (70 kg)              Today, you had the following     No orders found for display         Today's Medication Changes          These changes are accurate as of 6/21/18 12:03 PM.  If you have any questions, ask your nurse or doctor.               These medicines have changed or have updated prescriptions.        Dose/Directions    irbesartan 300 MG tablet   Commonly known as:  AVAPRO   This may have changed:    - how much to take  - how to take this  -  when to take this  - additional instructions   Used for:  Essential hypertension with goal blood pressure less than 140/90        TAKE 1 TABLET EVERY DAY   Quantity:  90 tablet   Refills:  2       methimazole 5 MG tablet   Commonly known as:  TAPAZOLE   This may have changed:  when to take this   Used for:  Nontoxic multinodular goiter        Dose:  5 mg   Take 1 tablet (5 mg) by mouth daily   Quantity:  90 tablet   Refills:  1       omeprazole 20 MG CR capsule   Commonly known as:  priLOSEC   This may have changed:  when to take this   Used for:  Gastroesophageal reflux disease without esophagitis        Dose:  20 mg   Take 1 capsule (20 mg) by mouth daily   Quantity:  180 capsule   Refills:  3       sertraline 100 MG tablet   Commonly known as:  ZOLOFT   This may have changed:  when to take this   Used for:  THERON (generalized anxiety disorder)        Dose:  100 mg   Take 1 tablet (100 mg) by mouth daily   Quantity:  90 tablet   Refills:  1                Primary Care Provider Office Phone # Fax #    Ppo Antonino Zapien [phone]       [facility] [place] [address]        Equal Access to Services     [facility] : Hadii aad ku hadasho Soomaali, waaxda luqadaha, qaybta kaalmada adeegyacristian, maldonado seay . So [facility] [phone].    ATENCIÓN: Si habla español, tiene a sierra disposición servicios gratuitos de asistencia lingüística. Llame al [phone].    We comply with applicable federal civil rights laws and [place] laws. We do not discriminate on the basis of race, color, national origin, age, disability, sex, sexual orientation, or gender identity.            Thank you!     Thank you for choosing [facility]  for your care. Our goal is always to provide you with excellent care. Hearing back from our patients is one way we can continue to improve our services. Please take a few minutes to complete the written survey that you may receive in the  "mail after your visit with us. Thank you!             Your Updated Medication List - Protect others around you: Learn how to safely use, store and throw away your medicines at www.disposemymeds.org.          This list is accurate as of 6/21/18 12:03 PM.  Always use your most recent med list.                   Brand Name Dispense Instructions for use Diagnosis    acetaminophen 650 MG 8 hour tablet     100 tablet    Take 650 mg by mouth every 8 hours as needed for mild pain or fever    Ascending cholangitis       alendronate 70 MG tablet    FOSAMAX    12 tablet    TAKE 1 TABLET EVERY 7 DAYS AT LEAST 60 MINUTES BEFORE BREAKFAST AS DIRECTED \"SEE PACKAGE FOR ADDITIONAL INSTRUCTIONS\"    High risk medication use, Osteopenia       ASPIRIN PO      Take 81 mg by mouth At Bedtime        CALCIUM + D PO      Take by mouth 2 times daily        colestipol 1 g tablet    COLESTID     TK 1 T PO BID - TAKE OTHER MEDS 1 HOUR BEFORE OR 4 HOURS AFTER COLESID        CRANBERRY CONCENTRATE PO      Take 2 capsules by mouth At Bedtime        cycloSPORINE 0.05 % ophthalmic emulsion    RESTASIS     Place 1 drop into both eyes 2 times daily        diazepam 2 MG tablet    VALIUM    20 tablet    Take 1 tablet (2 mg) by mouth every 12 hours as needed for anxiety or sleep    THERON (generalized anxiety disorder)       Fish Oil 500 MG Caps      Take 1 capsule by mouth 2 times daily        furosemide 20 MG tablet    LASIX          irbesartan 300 MG tablet    AVAPRO    90 tablet    TAKE 1 TABLET EVERY DAY    Essential hypertension with goal blood pressure less than 140/90       lovastatin 40 MG tablet    MEVACOR    90 tablet    TAKE 1 TABLET AT BEDTIME (HYPERLIPIDEMIA LDL GOAL  BELOW 130)    Hyperlipidemia LDL goal <130       Melatonin 10 MG Tabs tablet      Take 10 mg by mouth nightly as needed for sleep        METAMUCIL FIBER PO      Take by mouth 3 times daily        methimazole 5 MG tablet    TAPAZOLE    90 tablet    Take 1 tablet (5 mg) by mouth " daily    Nontoxic multinodular goiter       nitroGLYcerin 0.4 MG sublingual tablet    NITROSTAT    25 tablet    For chest pain place 1 tablet under the tongue every 5 minutes for 3 doses. If symptoms persist 5 minutes after 1st dose call 911.    Elevated troponin       omeprazole 20 MG CR capsule    priLOSEC    180 capsule    Take 1 capsule (20 mg) by mouth daily    Gastroesophageal reflux disease without esophagitis       propranolol 60 MG 24 hr capsule    INDERAL LA    90 capsule    Take 1 capsule (60 mg) by mouth daily    Nontoxic multinodular goiter       sertraline 100 MG tablet    ZOLOFT    90 tablet    Take 1 tablet (100 mg) by mouth daily    THERON (generalized anxiety disorder)       SYSTANE 0.4-0.3 % Soln ophthalmic solution   Generic drug:  polyethylene glycol 0.4%- propylene glycol 0.3%      Place 1 drop into both eyes 3 times daily as needed for dry eyes        traZODone 50 MG tablet    DESYREL    90 tablet    TAKE 1 TABLET(50 MG) BY MOUTH EVERY NIGHT AS NEEDED FOR SLEEP    Insomnia, unspecified type

## 2018-06-21 NOTE — PROGRESS NOTES
SUBJECTIVE:  Dimple Oviedo, a 84 year old female, is here to discuss the following issues:     Patient with history of AFib, HFpEF (EF 60-65% March 2018), CAD (NSTEMI), HTN, OA, PMR, Psoriasis, melanoma, GERD here with complaints of leg swelling.  She has chronic leg edema but last night they became more swollen and red and warm.  Had eaten a salty meal 2 days ago while at a party.  Has been elevating legs over night with some improvement.  Leg swelling has been associated with increased shortness of breath.  Has had some chest pressure intermittently since hospitalization in December.  Had normal coronary angiogram at that time.  No prolonged immobilization.  No calf pain.  Has lasix on hand for HF - takes 10 mg (1/2 tab) prn weight gain.  She did take a dose yesterday morning as her weight was up a few pounds: 150# baseline weight on home scale.  Yesterday was 154#  Today was 152.4#.    Has never smoked.    Problem list and histories reviewed & updated, as indicated.  Patient Active Problem List   Diagnosis     Esophageal reflux     restless leg     heat intolerance     Goiter     Disorder of bone and cartilage     Other psoriasis     perirectal cyst     Malignant melanoma of skin of trunk, except scrotum (H)     Nontoxic multinodular goiter     Hyperlipidemia LDL goal <130     Hypertension goal BP (blood pressure) < 140/90     Urinary incontinence     Osteoarthritis of left knee     Hip arthritis     Polymyalgia rheumatica (H)     High risk medication use     Shoulder pain     Impaired fasting blood sugar     Chronic bilateral low back pain without sciatica     Obesity, unspecified obesity severity, unspecified obesity type     Iron deficiency anemia, unspecified iron deficiency anemia type     NSTEMI (non-ST elevated myocardial infarction) (H)     Stress-induced cardiomyopathy     A-fib (H)     Atrial fibrillation, unspecified type (H)     Anxiety     Chronic systolic heart failure (H)     Urothelial carcinoma  (H)       BP Readings from Last 3 Encounters:   06/21/18 132/59   05/29/18 139/68   05/25/18 126/53    Wt Readings from Last 3 Encounters:   06/21/18 154 lb 8 oz (70.1 kg)   05/29/18 154 lb (69.9 kg)   05/25/18 154 lb 4 oz (70 kg)         Wt Readings from Last 10 Encounters:   06/21/18 154 lb 8 oz (70.1 kg)   05/29/18 154 lb (69.9 kg)   05/25/18 154 lb 4 oz (70 kg)   05/22/18 153 lb 3.2 oz (69.5 kg)   05/21/18 148 lb 14.4 oz (67.5 kg)   04/30/18 151 lb (68.5 kg)   04/03/18 146 lb 9.7 oz (66.5 kg)   03/29/18 148 lb 3.2 oz (67.2 kg)   03/22/18 149 lb (67.6 kg)   03/19/18 148 lb (67.1 kg)        ROS:  CONST: NEGATIVE for fever   CV: NEGATIVE for palpitations     OBJECTIVE:    /59  Pulse 62  Temp 98.1  F (36.7  C) (Oral)  Resp 18  Wt 154 lb 8 oz (70.1 kg)  SpO2 96%  BMI 33.42 kg/m2  GEN:  no apparent distress   EYES: No discharge or erythema, conjunctivae and sclerae clear   NECK:  Supple without adenopathy, mass, or thyromegaly  LUNGS:  normal respiratory effort, and lungs clear to auscultation bilaterally - no rales, rhonchi or wheezes  CV: regular rate and rhythm with occasional ectopic beat, normal S1 S2, no S3 or S4, no murmur, click or rub and bilateral lower extremities with both chronic woody and acute 1+ pitting edema   SKIN:  lower extremity skin is intact with faint, nondistinct, light erythema above her ankles without warmth   EXTREM: calves with no palpable cords or tenderness to palpation       ASSESSMENT/PLAN:    ICD-10-CM    1. Bilateral leg edema R60.0    2. Chronic venous insufficiency I87.2    3. (HFpEF) heart failure with preserved ejection fraction (H) I50.30      I do think her leg edema is multifactorial with chronic venous insufficiency and HFpEF components and her symptoms do represent an exacerbation of these conditions.  Her lung exam is clear and her weight and edema are improving with elevation and lasix.  I recommended that she continue elevating her legs and taking the low-dose  lasix for 2 more days.  If her weight is not back to baseline at that point she will notify us.  Discussed if she takes lasix any longer than 4 days we may need to monitor labs.  Discussed chronic venous insufficiency and reviewed management strategies.  I also gently reminded her about the importance of sodium restriction given her heart disease.      Patient Instructions   Continue to take the lasix for 2 more days.      Avoid salt.    Elevate your legs (above your heart) for 30 minutes once or twice a day during the afternoon.      Understanding Chronic Venous Insufficiency  Problems with the veins in the legs may lead to chronic venous insufficiency (CVI). CVI means that there is a long-term problem with the veins not being able to pump blood back to your heart. When this happens, blood stays in the legs and causes swelling and aching.   Two problems that may lead to chronic venous insufficiency are:    Damaged valves. Valves keep blood flowing from the legs through the blood vessels and back to the heart. When the valves are damaged, blood does not flow as well.     Deep vein thrombosis (DVT). Blood clots may form in the deep veins of the legs. This may cause pain, redness, and swelling in the legs. It may also block the flow of blood back to the heart. Seek medical care right away if you have these symptoms.    A blood clot in the leg can also break off and travel to the lungs. This is called pulmonary embolism (PE). In the lungs, the clot can cut off the flow of blood. This may cause chest pain, trouble breathing, sweating, a fast heartbeat, coughing (may cough up blood), and fainting. It is a medical emergency and may cause death. Call 911 if you have these symptoms.    Healthcare providers call the two conditions, DVT and PE, venous thromboembolism (VTE).  CVI can t be cured, but you can control leg swelling to reduce the likelihood of ulcers (sores).  Recognizing the symptoms  Be aware of the  following:    If you stand or sit with your feet down for long periods, your legs may ache or feel heavy.    Swollen ankles are possibly the most common symptom of CVI.    As swelling increases, the skin over your ankles may show red spots or a brownish tinge. The skin may feel leathery or scaly, and may start to itch.    If swelling is not controlled, an ulcer (open wound) may form.  What you can do  Reduce your risk of developing ulcers by doing the following:    Increase blood flow back to your heart by elevating your legs, exercising daily, and wearing elastic stockings.    Boost blood flow in your legs by losing excess weight.    If you must stand or sit in one place for a period of time, keep your blood moving by wiggling your toes, shifting your body position, and rising up on the balls of your feet.    Date Last Reviewed: 5/1/2016 2000-2017 [employer]. [address] [place] [address]. All rights reserved. This information is not intended as a substitute for professional medical care. Always follow your healthcare professional's instructions.            Bel Mckeon MD   [facility]

## 2018-06-22 LAB
T4 FREE SERPL-MCNC: 1.93 NG/DL (ref 0.76–1.46)
TSH SERPL DL<=0.005 MIU/L-ACNC: <0.01 MU/L (ref 0.4–4)

## 2018-06-27 ENCOUNTER — TELEPHONE (OUTPATIENT)
Dept: ENDOCRINOLOGY | Facility: CLINIC | Age: 83
End: 2018-06-27

## 2018-06-27 NOTE — TELEPHONE ENCOUNTER
[facility] Call Center    Phone Message    May a detailed message be left on voicemail: no    Reason for Call: Requesting Results   Name/type of test: TSH, free T4, and T3  Date of test: 6/21/2018  Was test done at a location other than [facility]?: No      Action Taken: Message routed to:  [facility] ([facility]): Endocrinology

## 2018-06-28 ENCOUNTER — TELEPHONE (OUTPATIENT)
Dept: OBGYN | Facility: CLINIC | Age: 83
End: 2018-06-28

## 2018-06-28 ENCOUNTER — TELEPHONE (OUTPATIENT)
Dept: ENDOCRINOLOGY | Facility: CLINIC | Age: 83
End: 2018-06-28

## 2018-06-28 DIAGNOSIS — E05.90 HYPERTHYROIDISM: Primary | ICD-10-CM

## 2018-06-28 NOTE — TELEPHONE ENCOUNTER
----- Message from Tonie Norwood sent at 6/26/2018  3:49 PM CDT -----  Regarding: US results  Contact: [phone]  Would like US results from 6/11

## 2018-06-28 NOTE — TELEPHONE ENCOUNTER
Called patient to discuss lab results.  TSH now suppressed and free T4 up to 1.93.  She is having some tremor and hot flashes, but denies palpitations or SOB.  Explained that it is likely she has graves disease and that treatment would be methimazole for 12-24 months or radioactive iodine. She favors the radioactive iodine.  Will schedule radioactive iodine scan and uptake and plan for rx after that.  She is agreeable to this plan.  Explained that radioactive iodine takes a few weeks to work and that if she has worsening sx after treatment we could transiently treat with methimazole.    Dhara Meza MD

## 2018-06-28 NOTE — TELEPHONE ENCOUNTER
Dimple was seen in clinic 5/22/18  d/t gross hematuria and US done.  Dimple calling for US results. I will forward to Dr Gomes for advice and poc.

## 2018-07-02 ENCOUNTER — HOSPITAL ENCOUNTER (OUTPATIENT)
Dept: NUCLEAR MEDICINE | Facility: CLINIC | Age: 83
Setting detail: NUCLEAR MEDICINE
Discharge: HOME | End: 2018-07-03
Attending: INTERNAL MEDICINE | Admitting: INTERNAL MEDICINE
Payer: MEDICARE

## 2018-07-02 DIAGNOSIS — E05.90 HYPERTHYROIDISM: ICD-10-CM

## 2018-07-02 PROCEDURE — 34300033 ZZH RX 343: Performed by: INTERNAL MEDICINE

## 2018-07-02 PROCEDURE — A9516 IODINE I-123 SOD IODIDE MIC: HCPCS | Performed by: INTERNAL MEDICINE

## 2018-07-02 RX ADMIN — Medication 255 UCI.: at 11:02

## 2018-07-02 NOTE — TELEPHONE ENCOUNTER
Tried to reach Dimple but received voicemail.  Left message that test done recently in clinic was normal and did not show a reason for her symptoms. Please call clinic with questions.       Bibi Gomes MD sent to  s [facility] 2 days ago                  Pls call pt and tell  Her sono did not show any reason for her urinary bleeding.  It was normal.  Gonzalez (Routing comment)

## 2018-07-03 ENCOUNTER — HOSPITAL ENCOUNTER (OUTPATIENT)
Dept: NUCLEAR MEDICINE | Facility: CLINIC | Age: 83
Setting detail: NUCLEAR MEDICINE
End: 2018-07-03
Attending: INTERNAL MEDICINE
Payer: MEDICARE

## 2018-07-03 DIAGNOSIS — E05.00 GRAVES DISEASE: Primary | ICD-10-CM

## 2018-07-03 DIAGNOSIS — E04.2 NON-TOXIC MULTINODULAR GOITER: ICD-10-CM

## 2018-07-03 PROCEDURE — 78014 THYROID IMAGING W/BLOOD FLOW: CPT

## 2018-07-05 ENCOUNTER — TELEPHONE (OUTPATIENT)
Dept: CARDIOLOGY | Facility: CLINIC | Age: 83
End: 2018-07-05

## 2018-07-05 ENCOUNTER — OFFICE VISIT (OUTPATIENT)
Dept: FAMILY MEDICINE | Facility: CLINIC | Age: 83
End: 2018-07-05
Payer: MEDICARE

## 2018-07-05 VITALS
BODY MASS INDEX: 33.01 KG/M2 | DIASTOLIC BLOOD PRESSURE: 53 MMHG | WEIGHT: 153 LBS | SYSTOLIC BLOOD PRESSURE: 131 MMHG | HEART RATE: 75 BPM | OXYGEN SATURATION: 95 % | HEIGHT: 57 IN | TEMPERATURE: 97.3 F | RESPIRATION RATE: 16 BRPM

## 2018-07-05 DIAGNOSIS — I50.30 (HFPEF) HEART FAILURE WITH PRESERVED EJECTION FRACTION (H): ICD-10-CM

## 2018-07-05 DIAGNOSIS — D64.9 NORMOCYTIC ANEMIA: ICD-10-CM

## 2018-07-05 DIAGNOSIS — I89.0 LYMPHEDEMA: ICD-10-CM

## 2018-07-05 DIAGNOSIS — I87.303 STASIS EDEMA OF BOTH LOWER EXTREMITIES: Primary | ICD-10-CM

## 2018-07-05 LAB
BASOPHILS # BLD AUTO: 0 10E9/L (ref 0–0.2)
BASOPHILS NFR BLD AUTO: 0.6 %
DIFFERENTIAL METHOD BLD: ABNORMAL
EOSINOPHIL # BLD AUTO: 0.1 10E9/L (ref 0–0.7)
EOSINOPHIL NFR BLD AUTO: 1 %
ERYTHROCYTE [DISTWIDTH] IN BLOOD BY AUTOMATED COUNT: 12.9 % (ref 10–15)
HCT VFR BLD AUTO: 32.7 % (ref 35–47)
HGB BLD-MCNC: 10.2 G/DL (ref 11.7–15.7)
LYMPHOCYTES # BLD AUTO: 1.7 10E9/L (ref 0.8–5.3)
LYMPHOCYTES NFR BLD AUTO: 33.4 %
MCH RBC QN AUTO: 29 PG (ref 26.5–33)
MCHC RBC AUTO-ENTMCNC: 31.2 G/DL (ref 31.5–36.5)
MCV RBC AUTO: 93 FL (ref 78–100)
MONOCYTES # BLD AUTO: 0.8 10E9/L (ref 0–1.3)
MONOCYTES NFR BLD AUTO: 15.2 %
NEUTROPHILS # BLD AUTO: 2.5 10E9/L (ref 1.6–8.3)
NEUTROPHILS NFR BLD AUTO: 49.8 %
PLATELET # BLD AUTO: 288 10E9/L (ref 150–450)
RBC # BLD AUTO: 3.52 10E12/L (ref 3.8–5.2)
WBC # BLD AUTO: 5 10E9/L (ref 4–11)

## 2018-07-05 PROCEDURE — 36415 COLL VENOUS BLD VENIPUNCTURE: CPT | Performed by: FAMILY MEDICINE

## 2018-07-05 PROCEDURE — 83880 ASSAY OF NATRIURETIC PEPTIDE: CPT | Performed by: FAMILY MEDICINE

## 2018-07-05 PROCEDURE — 83550 IRON BINDING TEST: CPT | Performed by: FAMILY MEDICINE

## 2018-07-05 PROCEDURE — 85025 COMPLETE CBC W/AUTO DIFF WBC: CPT | Performed by: FAMILY MEDICINE

## 2018-07-05 PROCEDURE — 80053 COMPREHEN METABOLIC PANEL: CPT | Performed by: FAMILY MEDICINE

## 2018-07-05 PROCEDURE — 99214 OFFICE O/P EST MOD 30 MIN: CPT | Performed by: FAMILY MEDICINE

## 2018-07-05 PROCEDURE — 83540 ASSAY OF IRON: CPT | Performed by: FAMILY MEDICINE

## 2018-07-05 RX ORDER — FUROSEMIDE 20 MG
20 TABLET ORAL EVERY MORNING
Start: 2018-07-05 | End: 2018-09-19

## 2018-07-05 NOTE — PATIENT INSTRUCTIONS
Take a full tablet of lasix daily in the morning for the next week.    Follow-up in clinic next week for a lab-only appointment Thurs or Friday.    Schedule lymphedema therapy to get swelling under control.

## 2018-07-05 NOTE — PROGRESS NOTES
"  SUBJECTIVE:   Dimple Oviedo is a 85 year old female who presents to clinic today for the following health issues:      Follow up from office visit 6/21/2018--patient states that leg swelling is getting worse. No pain but discomfort.    I saw her for this 2 weeks ago.  She has a history of AFib, HFpEF, CAD, HTN and CVI.    I had her take lasix for several days, which she uses prn for weight gain or worsening edema.    I also recommended elevation.  She states they started swelling again a few days ago.  She re-started the lasix (10 mg daily) 4-5 days ago.  It doesn't seem to have affected her swelling and she doesn't seem to get much diuresis with this dose.    She does weigh herself regularly and her baseline weight is 150#.  Yesterday it was 151#  She was just diagnosed with Graves Disease and started back on methimazole.    Regarding salt intake she notes she did eat an [employer] sandwich 3 days ago - after swelling had started to worsen.    FHX: She notes that her mother also had very swollen legs - including sores on her ankles in later life.    Problem list and histories reviewed & adjusted, as indicated.  Additional history: as documented    BP Readings from Last 3 Encounters:   07/05/18 131/53   06/21/18 132/59   05/29/18 139/68    Wt Readings from Last 3 Encounters:   07/05/18 153 lb (69.4 kg)   06/21/18 154 lb 8 oz (70.1 kg)   05/29/18 154 lb (69.9 kg)           Reviewed and updated as needed this visit by clinical staff  Tobacco  Allergies  Meds  Problems       Reviewed and updated as needed this visit by Provider  Problems         ROS:  RESP:  NEGATIVE for shortness of breath  CV:  NEGATIVE for chest pain      OBJECTIVE:     /53 (BP Location: Right arm, Patient Position: Sitting, Cuff Size: Adult Regular)  Pulse 75  Temp 97.3  F (36.3  C) (Oral)  Resp 16  Ht 4' 9\" (1.448 m)  Wt 153 lb (69.4 kg)  SpO2 95%  Breastfeeding? No  BMI 33.11 kg/m2  Body mass index is 33.11 kg/(m^2).  GEN:  no " apparent distress   HEAD/FACE: normocephalic/atruamatic  EYES: conjunctivae and sclerae clear, no proptosis noted   LUNGS:  normal respiratory effort, and lungs clear to auscultation bilaterally - no rales, rhonchi or wheezes  CV: regular rate and rhythm, normal S1 S2, no S3 or S4 and no murmur, click or rub  BILATERAL LOWER EXTREMITIES:  with marked edema bilaterally - both pitting edema and lymphedema up to knees  SKIN: slight erythema around ankles with no warmth, skin is intact without breakdown or lesions.      Diagnostic Test Results:  Results for orders placed or performed in visit on 07/05/18   CBC with platelets differential   Result Value Ref [place]    WBC 5.0 4.0 - 11.0 10e9/L    RBC Count 3.52 (L) 3.8 - 5.2 10e12/L    Hemoglobin 10.2 (L) 11.7 - 15.7 g/dL    Hematocrit 32.7 (L) 35.0 - 47.0 %    MCV 93 78 - 100 fl    MCH 29.0 26.5 - 33.0 pg    MCHC 31.2 (L) 31.5 - 36.5 g/dL    RDW 12.9 10.0 - 15.0 %    Platelet Count 288 150 - 450 10e9/L    Diff Method Automated Method     % Neutrophils 49.8 %    % Lymphocytes 33.4 %    % Monocytes 15.2 %    % Eosinophils 1.0 %    % Basophils 0.6 %    Absolute Neutrophil 2.5 1.6 - 8.3 10e9/L    Absolute Lymphocytes 1.7 0.8 - 5.3 10e9/L    Absolute Monocytes 0.8 0.0 - 1.3 10e9/L    Absolute Eosinophils 0.1 0.0 - 0.7 10e9/L    Absolute Basophils 0.0 0.0 - 0.2 10e9/L   Comprehensive metabolic panel   Result Value Ref [place]    Sodium 137 133 - 144 mmol/L    Potassium 4.7 3.4 - 5.3 mmol/L    Chloride 105 94 - 109 mmol/L    Carbon Dioxide 24 20 - 32 mmol/L    Anion Gap 8 3 - 14 mmol/L    Glucose 115 (H) 70 - 99 mg/dL    Urea Nitrogen 27 7 - 30 mg/dL    Creatinine 0.77 0.52 - 1.04 mg/dL    GFR Estimate 71 >60 mL/min/1.7m2    GFR Estimate If Black 86 >60 mL/min/1.7m2    Calcium 9.2 8.5 - 10.1 mg/dL    Bilirubin Total 0.3 0.2 - 1.3 mg/dL    Albumin 3.3 (L) 3.4 - 5.0 g/dL    Protein Total 6.6 (L) 6.8 - 8.8 g/dL    Alkaline Phosphatase 88 40 - 150 U/L    ALT 32 0 - 50 U/L    AST 24 0  - 45 U/L       ASSESSMENT/PLAN:       1. Stasis edema of both lower extremities  With acute exacerbation.  Contributing factors likely include the recent hot, humid weather and her diastolic HF.  It's possible the recent addition of methimazole is exacerbating the edema but this really looks chronic and I think she would be well-served with lymphedema therapy.  Discussed that she may need serial wraps to get edema under control and then may transition to compression stockings to maintain control.  In the mean time we will also increase her lasix dose to 20 mg QAM for the next week and she will return to clinic in a week for repeat BMP so we can monitor potassium and renal function.   - LYMPHEDEMA THERAPY REFERRAL  - CBC with platelets differential  - Comprehensive metabolic panel  - BNP-N terminal pro  - furosemide (LASIX) 20 MG tablet; Take 1 tablet (20 mg) by mouth every morning  - **Basic metabolic panel FUTURE anytime; Future    2. (HFpEF) heart failure with preserved ejection fraction (H)  She does have a history of HFpEF but lungs are clear so I do suspect her edema is primarily due to chronic venous insufficiency.  I will check BNP today and she has upcoming CORE appt.   - CBC with platelets differential  - Comprehensive metabolic panel  - BNP-N terminal pro  - furosemide (LASIX) 20 MG tablet; Take 1 tablet (20 mg) by mouth every morning  - **Basic metabolic panel FUTURE anytime; Future    Patient Instructions   Take a full tablet of lasix daily in the morning for the next week.    Follow-up in clinic next week for a lab-only appointment Thurs or Friday.    Schedule lymphedema therapy to get swelling under control.      Bel Mckeon MD  [facility]

## 2018-07-05 NOTE — MR AVS SNAPSHOT
"              After Visit Summary   7/5/2018    Dimple Oviedo    MRN: [id number]           Patient Information     Date Of Birth          6/23/1933        Visit Information        Provider Department      7/5/2018 1:40 PM Bel Mckeon MD [facility]        Today's Diagnoses     Stasis edema of both lower extremities    -  1    (HFpEF) heart failure with preserved ejection fraction (H)          Care Instructions    Take a full tablet of lasix daily in the morning for the next week.    Follow-up in clinic next week for a lab-only appointment Thurs or Friday.    Schedule lymphedema therapy to get swelling under control.          Follow-ups after your visit        Additional Services     LYMPHEDEMA THERAPY REFERRAL       *This therapy referral will be filtered to a centralized scheduling office at [facility] and the patient will receive a call to schedule an appointment at a [place] location most convenient for them. *   If you have not heard from the scheduling office within 2 business days, please call [phone] for all locations, with the exception of [place], please call [phone].     Treatment: PT or OT Evaluation & Treatment  Special Instructions: none  PT/OT Treatment Diagnosis: Edema    Please be aware that coverage of these services is subject to the terms and limitations of your health insurance plan.  Call member services at your health plan with any benefit or coverage questions.      **Note to Provider:  If you are referring outside of [place] for the therapy appointment, please list the name of the location in the \"special instructions\" above, print the referral and give to the patient to schedule the appointment.                  Your next 10 appointments already scheduled     Jul 23, 2018 11:00 AM CDT   LAB with  LAB    [facility]    [address] [place] [id number] [phone]        "    Please do not eat 10-12 hours before your appointment if you are coming in fasting for labs on lipids, cholesterol, or glucose (sugar). This does not apply to pregnant women. Water, hot tea and black coffee (with nothing added) are okay. Do not drink other fluids, diet soda or chew gum.            Jul 23, 2018 11:30 AM CDT   (Arrive by 11:15 AM)   CORE RETURN with NELSON Smart CNP   [facility] ([facility])    [address] [place] [address]  [address] [place] [address]   [phone]            Jul 26, 2018  2:20 PM CDT   (Arrive by 2:05 PM)   Return Visit with Stuart King MD   [facility] ([facility])    [address] [place] [address]  [address] [facility] [address]   [phone]            Aug 14, 2018  4:00 PM CDT   (Arrive by 3:45 PM)   RETURN ENDOCRINE with Dhara Meza MD   [facility] ([facility])    [address] [place] [address]  [address] [facility] [address]   [phone]            Sep 06, 2018 12:30 PM CDT   (Arrive by 12:15 PM)   NEW ARRHYTHMIA with Butch Griffith MD   [facility] ([facility])    [address] [place] [address]  [address] [place] [address]   [phone]              Future tests that were ordered for you today     Open Future Orders        Priority Expected Expires Ordered    **Basic metabolic panel FUTURE anytime Routine 7/5/2018 7/5/2019 7/5/2018            Who to contact     If you have questions or need follow up information about today's clinic visit or your schedule please contact [facility] directly at [phone].  Normal or non-critical lab and imaging results will be communicated to you by MyChart, letter or phone within 4 business days after the clinic has received the results. If you do not hear from us within 7 days, please contact the clinic through  "[employer] or phone. If you have a critical or abnormal lab result, we will notify you by phone as soon as possible.  Submit refill requests through [employer] or call your pharmacy and they will forward the refill request to us. Please allow 3 business days for your refill to be completed.          Additional Information About Your Visit        [employer] Information     [employer] gives you secure access to your electronic health record. If you see a primary care provider, you can also send messages to your care team and make appointments. If you have questions, please call your primary care clinic.  If you do not have a primary care provider, please call [phone] and they will assist you.        Care EveryWhere ID     This is your Care EveryWhere ID. This could be used by other organizations to access your [facility] medical records  [id number]        Your Vitals Were     Pulse Temperature Respirations Height Pulse Oximetry Breastfeeding?    75 97.3  F (36.3  C) (Oral) 16 4' 9\" (1.448 m) 95% No    BMI (Body Mass Index)                   33.11 kg/m2            Blood Pressure from Last 3 Encounters:   07/05/18 131/53   06/21/18 132/59   05/29/18 139/68    Weight from Last 3 Encounters:   07/05/18 153 lb (69.4 kg)   06/21/18 154 lb 8 oz (70.1 kg)   05/29/18 154 lb (69.9 kg)              We Performed the Following     BNP-N terminal pro     CBC with platelets differential     Comprehensive metabolic panel     LYMPHEDEMA THERAPY REFERRAL          Today's Medication Changes          These changes are accurate as of 7/5/18  2:08 PM.  If you have any questions, ask your nurse or doctor.               These medicines have changed or have updated prescriptions.        Dose/Directions    furosemide 20 MG tablet   Commonly known as:  LASIX   This may have changed:    - how much to take  - how to take this  - when to take this   Used for:  Stasis edema of both lower extremities, (HFpEF) heart failure with preserved ejection fraction " (H)   Changed by:  Bel Mckeon MD        Dose:  20 mg   Take 1 tablet (20 mg) by mouth every morning   Refills:  0       irbesartan 300 MG tablet   Commonly known as:  AVAPRO   This may have changed:    - how much to take  - how to take this  - when to take this  - additional instructions   Used for:  Essential hypertension with goal blood pressure less than 140/90        TAKE 1 TABLET EVERY DAY   Quantity:  90 tablet   Refills:  2       methimazole 5 MG tablet   Commonly known as:  TAPAZOLE   This may have changed:  when to take this   Used for:  Nontoxic multinodular goiter        Dose:  5 mg   Take 1 tablet (5 mg) by mouth daily   Quantity:  90 tablet   Refills:  1       omeprazole 20 MG CR capsule   Commonly known as:  priLOSEC   This may have changed:  when to take this   Used for:  Gastroesophageal reflux disease without esophagitis        Dose:  20 mg   Take 1 capsule (20 mg) by mouth daily   Quantity:  180 capsule   Refills:  3       sertraline 100 MG tablet   Commonly known as:  ZOLOFT   This may have changed:  when to take this   Used for:  THERON (generalized anxiety disorder)        Dose:  100 mg   Take 1 tablet (100 mg) by mouth daily   Quantity:  90 tablet   Refills:  1            Where to get your medicines      Some of these will need a paper prescription and others can be bought over the counter.  Ask your nurse if you have questions.     You don't need a prescription for these medications     furosemide 20 MG tablet                Primary Care Provider Office Phone # Fax #    Pop Antonino Zapien [phone]       [facility] [place], [place] [id number]        Equal Access to Services     GUNNER RODRIGUEZ AH: Aleja Pollack, waberylda evon, qaybta kaalmaldonado shah. So [facility] [phone].    ATENCIÓN: Si habla español, tiene a sierra disposición servicios gratuitos de asistencia lingüística. Llame al [phone].    We comply  "with applicable federal civil rights laws and [place] laws. We do not discriminate on the basis of race, color, national origin, age, disability, sex, sexual orientation, or gender identity.            Thank you!     Thank you for choosing [facility]  for your care. Our goal is always to provide you with excellent care. Hearing back from our patients is one way we can continue to improve our services. Please take a few minutes to complete the written survey that you may receive in the mail after your visit with us. Thank you!             Your Updated Medication List - Protect others around you: Learn how to safely use, store and throw away your medicines at www.disposemymeds.org.          This list is accurate as of 7/5/18  2:08 PM.  Always use your most recent med list.                   Brand Name Dispense Instructions for use Diagnosis    acetaminophen 650 MG 8 hour tablet     100 tablet    Take 650 mg by mouth every 8 hours as needed for mild pain or fever    Ascending cholangitis       alendronate 70 MG tablet    FOSAMAX    12 tablet    TAKE 1 TABLET EVERY 7 DAYS AT LEAST 60 MINUTES BEFORE BREAKFAST AS DIRECTED \"SEE PACKAGE FOR ADDITIONAL INSTRUCTIONS\"    High risk medication use, Osteopenia       ASPIRIN PO      Take 81 mg by mouth At Bedtime        CALCIUM + D PO      Take by mouth 2 times daily        colestipol 1 g tablet    COLESTID     TK 1 T PO BID - TAKE OTHER MEDS 1 HOUR BEFORE OR 4 HOURS AFTER COLESID        CRANBERRY CONCENTRATE PO      Take 2 capsules by mouth At Bedtime        cycloSPORINE 0.05 % ophthalmic emulsion    RESTASIS     Place 1 drop into both eyes 2 times daily        diazepam 2 MG tablet    VALIUM    20 tablet    Take 1 tablet (2 mg) by mouth every 12 hours as needed for anxiety or sleep    THERON (generalized anxiety disorder)       Fish Oil 500 MG Caps      Take 1 capsule by mouth 2 times daily        furosemide 20 MG tablet    LASIX     Take 1 tablet (20 mg) by mouth " every morning    Stasis edema of both lower extremities, (HFpEF) heart failure with preserved ejection fraction (H)       irbesartan 300 MG tablet    AVAPRO    90 tablet    TAKE 1 TABLET EVERY DAY    Essential hypertension with goal blood pressure less than 140/90       lovastatin 40 MG tablet    MEVACOR    90 tablet    TAKE 1 TABLET AT BEDTIME (HYPERLIPIDEMIA LDL GOAL BELOW 130)    Hyperlipidemia LDL goal <130       Melatonin 10 MG Tabs tablet      Take 10 mg by mouth nightly as needed for sleep        METAMUCIL FIBER PO      Take by mouth 3 times daily        methimazole 5 MG tablet    TAPAZOLE    90 tablet    Take 1 tablet (5 mg) by mouth daily    Nontoxic multinodular goiter       nitroGLYcerin 0.4 MG sublingual tablet    NITROSTAT    25 tablet    For chest pain place 1 tablet under the tongue every 5 minutes for 3 doses. If symptoms persist 5 minutes after 1st dose call 911.    Elevated troponin       omeprazole 20 MG CR capsule    priLOSEC    180 capsule    Take 1 capsule (20 mg) by mouth daily    Gastroesophageal reflux disease without esophagitis       propranolol 60 MG 24 hr capsule    INDERAL LA    90 capsule    Take 1 capsule (60 mg) by mouth daily    Nontoxic multinodular goiter       sertraline 100 MG tablet    ZOLOFT    90 tablet    Take 1 tablet (100 mg) by mouth daily    THERON (generalized anxiety disorder)       SYSTANE 0.4-0.3 % Soln ophthalmic solution   Generic drug:  polyethylene glycol 0.4%- propylene glycol 0.3%      Place 1 drop into both eyes 3 times daily as needed for dry eyes        traZODone 50 MG tablet    DESYREL    90 tablet    TAKE 1 TABLET(50 MG) BY MOUTH EVERY NIGHT AS NEEDED FOR SLEEP    Insomnia, unspecified type

## 2018-07-06 ENCOUNTER — TELEPHONE (OUTPATIENT)
Dept: CARDIOLOGY | Facility: CLINIC | Age: 83
End: 2018-07-06

## 2018-07-06 PROBLEM — I50.30 (HFPEF) HEART FAILURE WITH PRESERVED EJECTION FRACTION (H): Status: ACTIVE | Noted: 2018-07-06

## 2018-07-06 LAB
ALBUMIN SERPL-MCNC: 3.3 G/DL (ref 3.4–5)
ALP SERPL-CCNC: 88 U/L (ref 40–150)
ALT SERPL W P-5'-P-CCNC: 32 U/L (ref 0–50)
ANION GAP SERPL CALCULATED.3IONS-SCNC: 8 MMOL/L (ref 3–14)
AST SERPL W P-5'-P-CCNC: 24 U/L (ref 0–45)
BILIRUB SERPL-MCNC: 0.3 MG/DL (ref 0.2–1.3)
BUN SERPL-MCNC: 27 MG/DL (ref 7–30)
CALCIUM SERPL-MCNC: 9.2 MG/DL (ref 8.5–10.1)
CHLORIDE SERPL-SCNC: 105 MMOL/L (ref 94–109)
CO2 SERPL-SCNC: 24 MMOL/L (ref 20–32)
CREAT SERPL-MCNC: 0.77 MG/DL (ref 0.52–1.04)
GFR SERPL CREATININE-BSD FRML MDRD: 71 ML/MIN/1.7M2
GLUCOSE SERPL-MCNC: 115 MG/DL (ref 70–99)
NT-PROBNP SERPL-MCNC: 976 PG/ML (ref 0–450)
POTASSIUM SERPL-SCNC: 4.7 MMOL/L (ref 3.4–5.3)
PROT SERPL-MCNC: 6.6 G/DL (ref 6.8–8.8)
SODIUM SERPL-SCNC: 137 MMOL/L (ref 133–144)

## 2018-07-06 NOTE — TELEPHONE ENCOUNTER
Dimple's daughter in law Nell called in to report increased lower extremity edema. Had tried the PRN 10 mg Lasix 3-4 days in a row. Saw PCP yesterday who increased her Lasix to 20 mg daily and she started that today. Per Nell her legs are slightly better today. Weight has been around 150-151. Her breathing is ok. She made an appointment to see Elizabeth Richards on 7/23.  Is supposed to get labs next week per PCP. I told her I would notify Elizabeth and that we would have a nurse follow up with her early next week to see how she's doing.   Charu Conner RN

## 2018-07-06 NOTE — TELEPHONE ENCOUNTER
[facility] Call Center    Phone Message    May a detailed message be left on voicemail: yes    Reason for Call: Other: Pts daughter in law Day returning call from Christy Conner     Action Taken: Message routed to:  [facility] ([facility]

## 2018-07-09 ENCOUNTER — CARE COORDINATION (OUTPATIENT)
Dept: CARDIOLOGY | Facility: CLINIC | Age: 83
End: 2018-07-09

## 2018-07-09 ENCOUNTER — HOSPITAL ENCOUNTER (OUTPATIENT)
Dept: PHYSICAL THERAPY | Facility: CLINIC | Age: 83
Setting detail: THERAPIES SERIES
End: 2018-07-09
Attending: FAMILY MEDICINE
Payer: MEDICARE

## 2018-07-09 LAB
IRON SATN MFR SERPL: 12 % (ref 15–46)
IRON SERPL-MCNC: 49 UG/DL (ref 35–180)
TIBC SERPL-MCNC: 393 UG/DL (ref 240–430)
VIT B12 SERPL-MCNC: 388 PG/ML (ref 193–986)

## 2018-07-09 PROCEDURE — 82607 VITAMIN B-12: CPT | Performed by: FAMILY MEDICINE

## 2018-07-09 PROCEDURE — 40000449 ZZHC STATISTIC PT VISIT, LYMPHEDEMA: Mod: ZF | Performed by: PHYSICAL THERAPIST

## 2018-07-09 PROCEDURE — G8987 SELF CARE CURRENT STATUS: HCPCS | Mod: GP,CK,ZF | Performed by: PHYSICAL THERAPIST

## 2018-07-09 PROCEDURE — 97535 SELF CARE MNGMENT TRAINING: CPT | Mod: GP,ZF | Performed by: PHYSICAL THERAPIST

## 2018-07-09 PROCEDURE — 97162 PT EVAL MOD COMPLEX 30 MIN: CPT | Mod: GP,ZF | Performed by: PHYSICAL THERAPIST

## 2018-07-09 PROCEDURE — G8988 SELF CARE GOAL STATUS: HCPCS | Mod: GP,CJ,ZF | Performed by: PHYSICAL THERAPIST

## 2018-07-09 PROCEDURE — 97110 THERAPEUTIC EXERCISES: CPT | Mod: GP,ZF | Performed by: PHYSICAL THERAPIST

## 2018-07-09 NOTE — PROGRESS NOTES
" 07/09/18 1300   Quick Adds   Quick Adds Certification   Rehab Discipline   Discipline PT   Type of Visit   Type of visit Initial Edema Evaluation       present No   General Information   Start of care 07/09/18   Referring physician Dr. Bel Mckeon   Orders Evaluate and treat as indicated   Order date 07/05/18   Medical diagnosis CHF, venous stasis, Lymphedema   Edema onset 06/25/18   Affected body parts LLE;RLE   Edema etiology Chronic Venous Insufficiency   Pertinent history of current problem (PT: include personal factors and/or comorbidities that impact the POC; OT: include additional occupational profile info) Pt with history of CHF   Surgical / medical history reviewed Yes   Prior level of functional mobility Pt has been using a walker since about March due to dizziness from a thyroid issue   Prior treatment Diuretics;Compression garments   Community support (Family and Friends at co-op)   Patient role / employment history Retired   Psychosocial concerns Impaired body image   Living environment Apartment / condo   Living environment comments Pt lives in a senior co-op   Current assistive devices Front wheeled walker  (grab bars)   Fall Risk Screen   Fall screen completed by PT   Have you fallen 2 or more times in the past year? No   Have you fallen and had an injury in the past year? No   Is patient a fall risk? No   Fall screen comments Pt did have a trip some time ago, but it was not a true fall and she now is careful when walking over the threshhold   System Outcome Measures   Outcome Measures Lymphedema   Lymphedema Life Impact Scale (score range 0-72). A higher score indicates greater impairment. 27  (40%)   Subjective Report   Patient report of symptoms \"I have always had heavy legs, just fat, I got that from my grandmother.  Now they are pink.\" Pt reports 20# weight loss   Precautions   Precautions Cardiac Edema/CHF   Precautions comments Stable   Patient / Family Goals "   Patient / family goals statement Improve swelling, get ankles   Pain   Patient currently in pain No   Cognitive Status   Orientation Orientation to person, place and time   Level of consciousness Alert   Follows commands and answers questions 100% of the time   Personal safety and judgement Intact   Memory Intact   Edema Exam / Assessment   Skin condition Intact;Temperature;Non-pitting   Skin condition comments Firm swelling in the lower part of the lower legs, heavy ankle volume, spongy edema around the knees, feet seem not involved. Bruise healing below R knee, pt was unaware   Scar No   Stemmer sign Negative   Ulceration No   Girth Measurements   Girth Measurements Refer to separate girth measurement flowsheet   Volume LE   Right LE (mL) (Volumes not calculated from 10-40cm)   Range of Motion   ROM comments Functional   Strength   Strength comments Pt with decreased strength from not being up as much due to dizziness and other issued with her thyroid. She has been hospitalized with UTI in April   Posture   Posture Forward head position   Palpation   Palpation not tender   Activities of Daily Living   Activities of Daily Living independent   Bed Mobility   Bed mobility independent   Transfers   Transfers independent   Gait / Locomotion   Gait / Locomotion independent with her walker, will walk the hallways at her apartment   Sensory   Sensory perception comments Intact, no concern   Coordination   Coordination Gross motor coordination appropriate   Muscle Tone   Muscle tone No deficits were identified   Planned Edema Interventions   Planned edema interventions Manual lymph drainage;Gradient compression bandaging;Fit for compression garment;Exercises;Precautions to prevent infection / exacerbation;Education;Manual therapy;ADL training;Home management program development;Skin care / precautions   Clinical Impression   Criteria for skilled therapeutic intervention met Yes   Therapy diagnosis Lymphedema, impaired  "mobility   Influenced by the following impairments / conditions Phlebolymphedema;Stage 2;Lipolymphedema   Functional limitations due to impairments / conditions fit of clothing, needs help to manage edema, walking tolerance   Clinical Presentation Evolving/Changing   Clinical Presentation Rationale Pt has always felt that her legs were \"fat\" but now has swelling too, may need assist for bandaging, asks if this is really necessary   Clinical Decision Making (Complexity) Moderate complexity   Treatment frequency 3 times / week  (4 weeks and 1 month x2)   Treatment duration 90 days   Patient / family and/or staff in agreement with plan of care Yes   Risks and benefits of therapy have been explained Yes   Education Assessment   Preferred learning style Listening;Reading;Demonstration;Pictures / video   Barriers to learning Physical   Goals   Edema Eval Goals 1;2;3;4   Goal 1   Goal identifier Home program   Goal description pt will be independent with home program to manage her B LE swelling   Target date 10/06/18   Goal 2   Goal identifier Cirucumference   Goal description Pt will have 2cm decrease in measurement of B LE at 10cm above the heel for decreased risk of infection   Target date 10/06/18   Goal 3   Goal identifier LLIS   Goal description Pt will score 19 or less on LLIS for MICD in impact of swelling on quality of life.     Target date 10/06/18   Goal 4   Goal identifier Compression   Goal description Pt will be independent in donnind and doffing her compression garments to prevent exacerbation of swelling   Target date 10/06/18   Total Evaluation Time   Total evaluation time 20   Certification   Certification date from 07/09/18   Certification date to 10/06/18   Medical Diagnosis Venous stasis, Lymphedema, Lipolymphedema     "

## 2018-07-09 NOTE — PROGRESS NOTES
[facility]        OUTPATIENT PHYSICAL THERAPY EDEMA EVALUATION  PLAN OF TREATMENT FOR OUTPATIENT REHABILITATION  (COMPLETE FOR INITIAL CLAIMS ONLY)  Patient's Last Name, First Name, Dimple Berkowitz                           Provider s Name:   [facility] Medical Record No.  [id number]     Start of Care Date:  07/09/18   Onset Date:  06/25/18   Type:  PT   Medical Diagnosis:  Venous stasis, Lymphedema, Lipolymphedema   Therapy Diagnosis:  Lymphedema, impaired mobility Visits from SOC:  1                                     __________________________________________________________________________________   Plan of Treatment/Functional Goals:    Manual lymph drainage, Gradient compression bandaging, Fit for compression garment, Exercises, Precautions to prevent infection / exacerbation, Education, Manual therapy, ADL training, Home management program development, Skin care / precautions        GOALS  1. Goal description: pt will be independent with home program to manage her B LE swelling       Target date: 10/06/18  2. Goal description: Pt will have 2cm decrease in measurement of B LE at 10cm above the heel for decreased risk of infection       Target date: 10/06/18  3. Goal description: Pt will score 19 or less on LLIS for MICD in impact of swelling on quality of life.         Target date: 10/06/18  4. Goal description: Pt will be independent in donnind and doffing her compression garments to prevent exacerbation of swelling       Target date: 10/06/18      Treatment frequency: 3 times / week (4 weeks and 1 month x2)   Treatment duration: 90 days    Estela Lares, PT                                  I CERTIFY THE NEED FOR THESE SERVICES FURNISHED UNDER THIS PLAN OF TREATMENT AND WHILE UNDER MY CARE     (Physician signature in associated progress  note indicates review and certification of the therapy plan)                  Certification date from: 07/09/18       Certification date to: 10/06/18           Referring physician: Dr. Bel Mckeon   Initial Assessment  See Epic Evaluation- Start of care: 07/09/18

## 2018-07-10 NOTE — PROGRESS NOTES
Kong Musa,  I hope your first appointment with the edema clinic went well.  Here are all of your results.  Your complete blood counts showed a low hemoglobin (mild anemia) so I did add a couple additional tests to the sample the lab had.  That showed that your iron level and B12 level are ok.  We'll check a few additional anemia labs when you come back for the next lab draw.      Your comprehensive metabolic panel results (liver function, kidney function, blood sugar, and blood salts) look ok overall.  Your protein and albumin levels are a bit low which could contribute to to the edema.  Be sure you are getting sources of protein in your diet.       Your BNP (a test for heart failure - fluid in the lungs) is stable so I do think the swelling you are having is primarily due to lymphedema/chronic venous insufficiency.    Bel Mckeon MD

## 2018-07-11 ENCOUNTER — OFFICE VISIT (OUTPATIENT)
Dept: FAMILY MEDICINE | Facility: CLINIC | Age: 83
End: 2018-07-11
Payer: MEDICARE

## 2018-07-11 VITALS
RESPIRATION RATE: 14 BRPM | DIASTOLIC BLOOD PRESSURE: 46 MMHG | OXYGEN SATURATION: 97 % | SYSTOLIC BLOOD PRESSURE: 118 MMHG | BODY MASS INDEX: 32.46 KG/M2 | HEART RATE: 70 BPM | TEMPERATURE: 98.5 F | WEIGHT: 150 LBS

## 2018-07-11 DIAGNOSIS — G25.81 RESTLESS LEGS SYNDROME: ICD-10-CM

## 2018-07-11 DIAGNOSIS — I50.22 CHRONIC SYSTOLIC HEART FAILURE (H): ICD-10-CM

## 2018-07-11 DIAGNOSIS — M25.562 ACUTE PAIN OF LEFT KNEE: Primary | ICD-10-CM

## 2018-07-11 PROBLEM — I48.91 ATRIAL FIBRILLATION, UNSPECIFIED TYPE (H): Status: RESOLVED | Noted: 2018-01-25 | Resolved: 2018-07-11

## 2018-07-11 PROBLEM — I50.30 (HFPEF) HEART FAILURE WITH PRESERVED EJECTION FRACTION (H): Status: RESOLVED | Noted: 2018-07-06 | Resolved: 2018-07-11

## 2018-07-11 PROCEDURE — 99214 OFFICE O/P EST MOD 30 MIN: CPT | Performed by: FAMILY MEDICINE

## 2018-07-11 RX ORDER — ROPINIROLE 0.25 MG/1
0.25 TABLET, FILM COATED ORAL
Qty: 30 TABLET | Refills: 0 | Status: SHIPPED | OUTPATIENT
Start: 2018-07-11 | End: 2018-07-11

## 2018-07-11 NOTE — PROGRESS NOTES
SUBJECTIVE:   Dimple Oviedo is a 85 year old female who presents to clinic today for the following health issues:      Edema       Duration: A while, but worse in the last couple of weeks    Description (location/character/radiation): left knee    Intensity:  moderate, severe    Accompanying signs and symptoms: intermittent swelling and she is noticing that the pain is increasing since this morning.     History (similar episodes/previous evaluation): None    Precipitating or alleviating factors: hard to walk.    Therapies tried and outcome: keeping both legs raised     Pt also experiencing shortness of breath for the last couple of days. Would like her heart check.    Last couple of weeks swelling was getting worse.     Now left knee issue. Keeping legs elevated and felt knee is hyperextending and it was painful.  Today by the time she come for knee is completely normal.     Not unhappy but feels depression is there due to her so many mental health conditions.     Methimazole was stopped and it was started again as she is feeling worse.    Blood in urine is suddenly quit. Was also seen by OBGYN.    Having restless leg syndrome. Took amitriptyline in the past.     Going to see cardiologist in few weeks. Breathing is still not the best.    Problem list and histories reviewed & adjusted, as indicated.  Additional history: as documented    Labs reviewed in EPIC    Reviewed and updated as needed this visit by clinical staff  Tobacco  Allergies  Med Hx  Surg Hx  Fam Hx  Soc Hx      Reviewed and updated as needed this visit by Provider      Social History     Social History     Marital status:      Spouse name:      Number of children: 2     Years of education: N/A     Occupational History      Retired     Social History Main Topics     Smoking status: Never Smoker     Smokeless tobacco: Never Used     Alcohol use No      Comment: 6 times per year-one drink     Drug use: No     Sexual activity: Yes      Partners: Male     Other Topics Concern      Service No     Blood Transfusions No     Exercise Yes     curves     Seat Belt Yes     Self-Exams Yes     Parent/Sibling W/ Cabg, Mi Or Angioplasty Before 65f 55m? No     Social History Narrative    December 7, 2009    Balanced Diet - Yes    Osteoporosis Prevention Measures - Dairy servings per day: 2 and Medication/Supplements (See current meds)    Regular Exercise -  Yes Describe curves, walking    Dental Exam - YES - Date: 10/2009    Eye Exam - YES - Date: 2008    Self Breast Exam - Yes    Abuse: Current or Past (Physical, Sexual or Emotional)- No    Do you feel safe in your environment - Yes    Guns stored in the home - No    Sunscreen used - Yes    Seatbelts used - Yes    Lipids -  YES - Date: 11/24/2009    Glucose -  YES - Date: 11/24/2009    Colon Cancer Screening - Colonoscopy 11/18/2005(date completed)    Hemoccults - YES - Date: 10/12/2004    Pap Test -  YES - Date: 09/22/2004    Do you have any concerns about STD's -  No    Mammography - YES - Date: 11/24/2009    DEXA - YES - Date: 11/20/2008    Immunizations reviewed and up to date - Yes    PAULETTE Spencer CMA                 Allergies   Allergen Reactions     No Known Drug Allergies      Patient Active Problem List   Diagnosis     Esophageal reflux     Restless leg syndrome     heat intolerance     Goiter     Disorder of bone and cartilage     Other psoriasis     perirectal cyst     Malignant melanoma of skin of trunk, except scrotum (H)     Nontoxic multinodular goiter     Hyperlipidemia LDL goal <130     Hypertension goal BP (blood pressure) < 140/90     Urinary incontinence     Osteoarthritis of left knee     Hip arthritis     Polymyalgia rheumatica (H)     High risk medication use     Shoulder pain     Impaired fasting blood sugar     Chronic bilateral low back pain without sciatica     Obesity, unspecified obesity severity, unspecified obesity type     Iron deficiency anemia, unspecified iron  deficiency anemia type     NSTEMI (non-ST elevated myocardial infarction) (H)     Stress-induced cardiomyopathy     A-fib (H)     Atrial fibrillation, unspecified type (H)     Anxiety     Chronic systolic heart failure (H)     Urothelial carcinoma (H)     Hyperthyroidism     (HFpEF) heart failure with preserved ejection fraction (H)     Restless legs syndrome     Reviewed medications, social history and  past medical and surgical history.    Review of system: for general, respiratory, CVS, GI and psychiatry negative except for noted above.     EXAM:  /46 (BP Location: Right arm, Patient Position: Sitting, Cuff Size: Adult Regular)  Pulse 70  Temp 98.5  F (36.9  C) (Oral)  Resp 14  Wt 150 lb (68 kg)  SpO2 97%  BMI 32.46 kg/m2  Constitutional: healthy, alert and no distress   Psychiatric: Anxious but pleasant.  JOINT/EXTREMITIES: Diffuse swelling of both lower extremities.  Left knee examined and no palpable deformity.  No palpable tender spot.  Active and passive range of motion not restricted at the knee joint.    ASSESSMENT / PLAN:   (M25.562) Acute pain of left knee  (primary encounter diagnosis)  Comment:   Plan: Now pain has been resolved.  She has a history of arthritis.  Most likely it must be from hyper extension of her knee joint while sleeping and keeping her leg elevated.  We discussed about keeping her leg elevated but also put support under the knee joint.    (G25.81) Restless legs syndrome  Comment: She has a long history of aseptic syndrome and lately her symptoms are worsening.  She used to be on amitriptyline which is certainly not an ideal drug for her age and with her current comorbidities.  She is having significant symptoms and she would like to try medication we discussed about trying Requip.  We discussed about dizziness, hypertension, worsening of leg swelling and other side effects.  She will try to use it as needed first and see how she does.  Her recent hemoglobin and iron count  were checked and they were fine.  Plan: rOPINIRole (REQUIP) 0.25 MG tablet            (I50.22) Chronic systolic heart failure (H)  Comment: Her weight is stable.  She is giving an eye on it.  She is seeing a cardiologist in P weeks.  Plan: Further care as per cardiology.         Follow up:  In 3-6 months if doing well.

## 2018-07-11 NOTE — MR AVS SNAPSHOT
After Visit Summary   7/11/2018    Dipmle Oviedo    MRN: [id number]           Patient Information     Date Of Birth          6/23/1933        Visit Information        Provider Department      7/11/2018 1:20 PM Pop Zapien MD [facility]        Today's Diagnoses     Acute pain of left knee    -  1    Restless legs syndrome        Chronic systolic heart failure (H)           Follow-ups after your visit        Your next 10 appointments already scheduled     Jul 23, 2018 11:00 AM CDT   LAB with  LAB   [facility] ([facility])    [address] [place] [address]  [address] [facility] [id number] [phone]           Please do not eat 10-12 hours before your appointment if you are coming in fasting for labs on lipids, cholesterol, or glucose (sugar). This does not apply to pregnant women. Water, hot tea and black coffee (with nothing added) are okay. Do not drink other fluids, diet soda or chew gum.            Jul 23, 2018 11:30 AM CDT   (Arrive by 11:15 AM)   CORE RETURN with NELSON Smart CNP   [facility] ([facility])    [address] [place] [address]  [address] [place] [id number] [phone]            Jul 26, 2018  2:20 PM CDT   (Arrive by 2:05 PM)   Return Visit with Stuart King MD   [facility] ([facility])    [address] [place] [address]  4th Floor  [facility] [id number] [phone]            Aug 14, 2018  4:00 PM CDT   (Arrive by 3:45 PM)   RETURN ENDOCRINE with Dhara Meza MD   [facility] ([facility])    [address] [place] [address]  3rd Floor  [facility] [id number] [phone]            Sep 06, 2018 12:30 PM CDT   (Arrive by 12:15 PM)   NEW ARRHYTHMIA with Butch Griffith MD   [facility] ([facility])    [address] [place] [address]  [address]  [facility] [id number] [phone]            Nov 26, 2018  1:30 PM CST   US THYROID with UCUS2   [facility] ([facility])    [address] [place] [id number] [phone]           Please bring a list of your medicines (including vitamins, minerals and over-the-counter drugs). Also, tell your doctor about any allergies you may have. Wear comfortable clothes and leave your valuables at home.  You do not need to do anything special to prepare for your exam.  Please call the Imaging Department at your exam site with any questions.            Nov 26, 2018  2:15 PM CST   LAB with  LAB   [facility] ([facility])    [address] [place] [id number] [phone]           Please do not eat 10-12 hours before your appointment if you are coming in fasting for labs on lipids, cholesterol, or glucose (sugar). This does not apply to pregnant women. Water, hot tea and black coffee (with nothing added) are okay. Do not drink other fluids, diet soda or chew gum.            Dec 04, 2018  1:30 PM CST   (Arrive by 1:15 PM)   RETURN ENDOCRINE with Dhara Meza MD   [facility] ([facility])    [address] [place] [id number] [phone]              Who to contact     If you have questions or need follow up information about today's clinic visit or your schedule please contact [facility] directly at [phone].  Normal or non-critical lab and imaging results will be communicated to you by MyChart, letter or phone within 4 business days after the clinic has received the results. If you do not hear from us within 7 days, please contact the clinic through MyChart or phone. If you have a critical or abnormal lab result, we will notify you by phone as soon as possible.  Submit refill requests through [employer] or call  your pharmacy and they will forward the refill request to us. Please allow 3 business days for your refill to be completed.          Additional Information About Your Visit        [employer] Information     [employer] gives you secure access to your electronic health record. If you see a primary care provider, you can also send messages to your care team and make appointments. If you have questions, please call your primary care clinic.  If you do not have a primary care provider, please call [phone] and they will assist you.        Care EveryWhere ID     This is your Care EveryWhere ID. This could be used by other organizations to access your [place] medical records  [id number]        Your Vitals Were     Pulse Temperature Respirations Pulse Oximetry BMI (Body Mass Index)       70 98.5  F (36.9  C) (Oral) 14 97% 32.46 kg/m2        Blood Pressure from Last 3 Encounters:   07/11/18 118/46   07/05/18 131/53   06/21/18 132/59    Weight from Last 3 Encounters:   07/11/18 150 lb (68 kg)   07/05/18 153 lb (69.4 kg)   06/21/18 154 lb 8 oz (70.1 kg)              Today, you had the following     No orders found for display         Today's Medication Changes          These changes are accurate as of 7/11/18  2:52 PM.  If you have any questions, ask your nurse or doctor.               Start taking these medicines.        Dose/Directions    rOPINIRole 0.25 MG tablet   Commonly known as:  REQUIP   Used for:  Restless legs syndrome   Started by:  Pop Zapien MD        Dose:  0.25 mg   Take 1 tablet (0.25 mg) by mouth nightly as needed   Quantity:  30 tablet   Refills:  0         These medicines have changed or have updated prescriptions.        Dose/Directions    irbesartan 300 MG tablet   Commonly known as:  AVAPRO   This may have changed:    - how much to take  - how to take this  - when to take this  - additional instructions   Used for:  Essential hypertension with goal blood pressure less than 140/90         TAKE 1 TABLET EVERY DAY   Quantity:  90 tablet   Refills:  2       methimazole 5 MG tablet   Commonly known as:  TAPAZOLE   This may have changed:  when to take this   Used for:  Nontoxic multinodular goiter        Dose:  5 mg   Take 1 tablet (5 mg) by mouth daily   Quantity:  90 tablet   Refills:  1       omeprazole 20 MG CR capsule   Commonly known as:  priLOSEC   This may have changed:  when to take this   Used for:  Gastroesophageal reflux disease without esophagitis        Dose:  20 mg   Take 1 capsule (20 mg) by mouth daily   Quantity:  180 capsule   Refills:  3       sertraline 100 MG tablet   Commonly known as:  ZOLOFT   This may have changed:  when to take this   Used for:  THERON (generalized anxiety disorder)        Dose:  100 mg   Take 1 tablet (100 mg) by mouth daily   Quantity:  90 tablet   Refills:  1         Stop taking these medicines if you haven't already. Please contact your care team if you have questions.     diazepam 2 MG tablet   Commonly known as:  VALIUM   Stopped by:  Pop Zapien MD                Where to get your medicines      These medications were sent to [facility] [address] [place] [address] AT [address] [address]    Hours:  24-hours Phone:  [phone]     rOPINIRole 0.25 MG tablet                Primary Care Provider Office Phone # Fax #    Pop Zapien [phone] [address] [place] [id number]        Equal Access to Services     GUNNER RODRIGUEZ AH: Hadii shameka funko Sogabo, waaxda luqadaha, qaybta kaalmada adeegyada, waxay marianne bey. So [facility] [phone].    ATENCIÓN: Si habla español, tiene a sierra disposición servicios gratuitos de asistencia lingüística. Llame al [phone].    We comply with applicable federal civil rights laws and [place] laws. We do not discriminate on the basis of race, color, national origin,  "age, disability, sex, sexual orientation, or gender identity.            Thank you!     Thank you for choosing [facility]  for your care. Our goal is always to provide you with excellent care. Hearing back from our patients is one way we can continue to improve our services. Please take a few minutes to complete the written survey that you may receive in the mail after your visit with us. Thank you!             Your Updated Medication List - Protect others around you: Learn how to safely use, store and throw away your medicines at www.disposemymeds.org.          This list is accurate as of 7/11/18  2:52 PM.  Always use your most recent med list.                   Brand Name Dispense Instructions for use Diagnosis    acetaminophen 650 MG 8 hour tablet     100 tablet    Take 650 mg by mouth every 8 hours as needed for mild pain or fever    Ascending cholangitis       alendronate 70 MG tablet    FOSAMAX    12 tablet    TAKE 1 TABLET EVERY 7 DAYS AT LEAST 60 MINUTES BEFORE BREAKFAST AS DIRECTED \"SEE PACKAGE FOR ADDITIONAL INSTRUCTIONS\"    High risk medication use, Osteopenia       ASPIRIN PO      Take 81 mg by mouth At Bedtime        CALCIUM + D PO      Take by mouth 2 times daily        colestipol 1 g tablet    COLESTID     TK 1 T PO BID - TAKE OTHER MEDS 1 HOUR BEFORE OR 4 HOURS AFTER COLESID        CRANBERRY CONCENTRATE PO      Take 2 capsules by mouth At Bedtime        cycloSPORINE 0.05 % ophthalmic emulsion    RESTASIS     Place 1 drop into both eyes 2 times daily        Fish Oil 500 MG Caps      Take 1 capsule by mouth 2 times daily        furosemide 20 MG tablet    LASIX     Take 1 tablet (20 mg) by mouth every morning    Stasis edema of both lower extremities, (HFpEF) heart failure with preserved ejection fraction (H)       irbesartan 300 MG tablet    AVAPRO    90 tablet    TAKE 1 TABLET EVERY DAY    Essential hypertension with goal blood pressure less than 140/90       lovastatin 40 MG tablet    " MEVACOR    90 tablet    TAKE 1 TABLET AT BEDTIME (HYPERLIPIDEMIA LDL GOAL BELOW 130)    Hyperlipidemia LDL goal <130       Melatonin 10 MG Tabs tablet      Take 10 mg by mouth nightly as needed for sleep        METAMUCIL FIBER PO      Take by mouth 3 times daily        methimazole 5 MG tablet    TAPAZOLE    90 tablet    Take 1 tablet (5 mg) by mouth daily    Nontoxic multinodular goiter       nitroGLYcerin 0.4 MG sublingual tablet    NITROSTAT    25 tablet    For chest pain place 1 tablet under the tongue every 5 minutes for 3 doses. If symptoms persist 5 minutes after 1st dose call 911.    Elevated troponin       omeprazole 20 MG CR capsule    priLOSEC    180 capsule    Take 1 capsule (20 mg) by mouth daily    Gastroesophageal reflux disease without esophagitis       propranolol 60 MG 24 hr capsule    INDERAL LA    90 capsule    Take 1 capsule (60 mg) by mouth daily    Nontoxic multinodular goiter       rOPINIRole 0.25 MG tablet    REQUIP    30 tablet    Take 1 tablet (0.25 mg) by mouth nightly as needed    Restless legs syndrome       sertraline 100 MG tablet    ZOLOFT    90 tablet    Take 1 tablet (100 mg) by mouth daily    THERON (generalized anxiety disorder)       SYSTANE 0.4-0.3 % Soln ophthalmic solution   Generic drug:  polyethylene glycol 0.4%- propylene glycol 0.3%      Place 1 drop into both eyes 3 times daily as needed for dry eyes        traZODone 50 MG tablet    DESYREL    90 tablet    TAKE 1 TABLET(50 MG) BY MOUTH EVERY NIGHT AS NEEDED FOR SLEEP    Insomnia, unspecified type

## 2018-07-12 ASSESSMENT — PATIENT HEALTH QUESTIONNAIRE - PHQ9: SUM OF ALL RESPONSES TO PHQ QUESTIONS 1-9: 10

## 2018-07-12 NOTE — TELEPHONE ENCOUNTER
CHIEF COMPLAINT: Unresponsive, Chest Pain





PCP:  Dr. Yesenia Burns [phone]





HCP: Katie Robertson (sister) [phone]





HISTORY OF PRESENT ILLNESS:


This is a 59 y/o male with a past medical history of IDDM, HTN, CKD (RTA4), 

Cervical Spinal Stenosis, Neuropathy, R- Foot Drop, Atrophy, Depression. Who 

presents to the emergency department from [facility] unresponsive, 

FS 30, D10 given by EMS.The patient reports having a blood sugar level of 94 

this morning and was given lantus. Later the patient reports he was sitting in 

his chair waiting to get breakfast when he had a sudden onset of dizziness and 

loss consciousness. The patient reports having left sided chest/rib pain x 1 

month worse when he coughs, he reports using bengay with some relief. Patient 

reports having a non-productive dry cough, without subjective fever or chills. 

Patient denies SOB, AP, N/V/D, constipation, dysuria





ER course was notable for:


(1) Trop 0.02~ 0.18


(2) EKG- NSR 75bpm, inferior infarct, age undetermined


(3) Glucose 75





Recent Travel: None





PAST MEDICAL HISTORY:


IDDM


HTN


CKD (RTA4)


Cervical Spinal Stenosis


Neuropathy 


Depression





PAST SURGICAL HISTORY:


R- heel 





Social History:


Smoking: Former 


Alcohol:  None


Drugs:    None


Resides in Assisted Living Facility





Family History:


Father: Diabetes  age 65


Mother: Arthritis  age 81





Allergies





No Known Drug Allergies Allergy (Verified 16 09:26)


 








HOME MEDICATIONS:


 Home Medications











 Medication  Instructions  Recorded


 


Amlodipine Besylate [Norvasc -] 10 mg PO DAILY 11/15/16


 


Calcitriol [Calcitriol -] 0.25 mcg PO DAILY 11/15/16


 


Clonidine HCl 0.1 mg PO TID 11/15/16


 


Furosemide [Lasix -] 40 mg PO DAILY 11/15/16


 


Gabapentin [Neurontin] 300 mg PO BID 11/15/16


 


Hypromellose 0.5% Opth Soln 1 drop OU PRN PRN 11/15/16





[Artificial Tears]  


 


Insulin Glargine,Hum.rec.anlog 36 units SQ DAILY 11/15/16





[Lantus Solostar PEN -]  


 


Latanoprost 0.005% Eye Drops 1 drop OU HS 11/15/16





[Xalatan 0.005% Eye Drops -]  


 


Meclizine HCl [Antivert -] 12.5 mg PO PRN PRN 11/15/16


 


Multivitamins [Multivit ([facility] 1 tab PO DAILY 11/15/16





Formulary)]  


 


Pantoprazole Sodium [Protonix] 40 mg PO DAILY 11/15/16


 


Acetaminophen [Tylenol Extra 500 mg PO PRN PRN #0  16





Strength]  


 


Ammonium Lactate Cream [Lac-Hydrin 0.01 unit TP DAILY 17





12% Cream -]  


 


Bacitracin - [Bacitracin Topical 1 applic TP DAILY 17





Ointment -]  


 


Duloxetine HCl [Cymbalta] 30 mg PO DAILY 17


 


Hydrocodone/Acetaminophen 0 each PO Q6H MDD 4 17





[Hydrocodon-Acetaminoph 7.5-325]  


 


Lactulose [Cephulac -] 30 ml PO DAILY 17


 


Linagliptin [Tradjenta] 5 mg PO DAILY 17


 


Sodium Polystyrene Sulfonate 15 gm PO DAILY 17


 


Synalar Ts 0.01% Kit 0.01 drop TP DAILY 17








REVIEW OF SYSTEMS


CONSTITUTIONAL: 


Absent:  fever, chills, diaphoresis, generalized weakness, malaise, loss of 

appetite, weight change


HEENT: 


Absent:  rhinorrhea, nasal congestion, throat pain, throat swelling, difficulty 

swallowing, mouth swelling, ear pain, eye pain, visual changes


CARDIOVASCULAR: chest pain


Absent: syncope, palpitations, irregular heart rate, lightheadedness, 

peripheral edema


RESPIRATORY: cough


Absent: shortness of breath, dyspnea with exertion, orthopnea, wheezing, stridor

, hemoptysis


GASTROINTESTINAL:


Absent: abdominal pain, abdominal distension, nausea, vomiting, diarrhea, 

constipation, melena, hematochezia


GENITOURINARY: 


Absent: dysuria, frequency, urgency, hesitancy, hematuria, flank pain, genital 

pain


MUSCULOSKELETAL: 


Absent: myalgia, arthralgia, joint swelling, back pain, neck pain


SKIN: 


Absent: rash, itching, pallor


HEMATOLOGIC/IMMUNOLOGIC: 


Absent: easy bleeding, easy bruising, lymphadenopathy, frequent infections


ENDOCRINE:


Absent: unexplained weight gain, unexplained weight loss, heat intolerance, 

cold intolerance


NEUROLOGIC:  dizziness, unresponsive


Absent: headache, focal weakness or paresthesias, unsteady gait, seizure, 

mental status changes, bladder or bowel incontinence


PSYCHIATRIC: 


Absent: anxiety, depression, suicidal or homicidal ideation, hallucinations.








PHYSICAL EXAMINATION


 Vital Signs - 24 hr











  17





  10:31 10:33


 


Temperature  98.4 F


 


Pulse Rate  80


 


Respiratory  18





Rate  


 


Blood Pressure  165/80


 


O2 Sat by Pulse 97 100





Oximetry (%)  











GENERAL: Awake, alert, and fully oriented, in no acute distress.


HEAD: Normal with no signs of trauma.


EYES: Pupils equal, round and reactive to light, extraocular movements intact, 

sclera anicteric, conjunctiva clear. No lid lag.


EARS, NOSE, THROAT: Ears normal, nares patent, oropharynx clear without 

exudates. Moist mucous membranes.


NECK: Normal range of motion, supple without lymphadenopathy, JVD, or masses.


LUNGS: Breath sounds equal, clear to auscultation bilaterally. No wheezes, and 

no crackles. No accessory muscle use.


HEART: Regular rate and rhythm, normal S1 and S2 without murmur, rub or gallop. 

reproducible left sided CP upon palpation


ABDOMEN: Soft, nontender, not distended, normoactive bowel sounds, no guarding, 

no rebound, no masses.  No hepatomegaly or  splenomegaly. 


MUSCULOSKELETAL: Normal range of motion at all joints. No bony deformities or 

tenderness. No CVA tenderness.


UPPER EXTREMITIES: 2+ pulses, warm, well-perfused. No cyanosis. No clubbing. No 

peripheral edema.


LOWER EXTREMITIES: 2+ pulses, warm, well-perfused. No calf tenderness. No 

peripheral edema. 


NEUROLOGICAL:  Cranial nerves II-XII intact. Normal speech. Gait not observed, +

right foot drop.


PSYCHIATRIC: Cooperative. Good eye contact. Appropriate mood and affect.


SKIN: Warm, dry, normal turgor, no rashes or lesions noted, normal capillary 

refill. 





 Laboratory Results - last 24 hr











  17





  10:43 10:43 10:43


 


WBC  8.5  


 


RBC  4.66  


 


Hgb  14.6  


 


Hct  44.4  


 


MCV  95.3  


 


MCHC  32.9  


 


RDW  13.8  


 


Plt Count  160  


 


MPV  10.1  


 


Neutrophils %  69.2  


 


Lymphocytes %  20.7  


 


Monocytes %  7.2  


 


Eosinophils %  2.4  


 


Basophils %  0.5  


 


INR    0.97


 


Sodium   141 


 


Potassium   3.4 L 


 


Chloride   103 


 


Carbon Dioxide   25 


 


Anion Gap   13 


 


BUN   31 H 


 


Creatinine   2.9 H 


 


Creat Clearance w eGFR   22.31 


 


POC Glucometer   


 


Random Glucose   75 


 


Calcium   8.5 


 


Magnesium   


 


Total Bilirubin   0.4 


 


AST   19 


 


ALT   22 


 


Alkaline Phosphatase   89 


 


Creatine Kinase   151 


 


Creatine Kinase Index   1.4 


 


CK-MB (CK-2)   2.264 


 


CK-MB (CK-2) Rel Index   


 


Troponin I   < 0.02 


 


Total Protein   8.0 


 


Albumin   4.0 














  17





  10:43 10:43 12:19


 


WBC   


 


RBC   


 


Hgb   


 


Hct   


 


MCV   


 


MCHC   


 


RDW   


 


Plt Count   


 


MPV   


 


Neutrophils %   


 


Lymphocytes %   


 


Monocytes %   


 


Eosinophils %   


 


Basophils %   


 


INR   


 


Sodium   


 


Potassium   


 


Chloride   


 


Carbon Dioxide   


 


Anion Gap   


 


BUN   


 


Creatinine   


 


Creat Clearance w eGFR   


 


POC Glucometer    [phone]


 


Random Glucose   


 


Calcium   


 


Magnesium  2.2  


 


Total Bilirubin   


 


AST   


 


ALT   


 


Alkaline Phosphatase   


 


Creatine Kinase   


 


Creatine Kinase Index   


 


CK-MB (CK-2)   


 


CK-MB (CK-2) Rel Index   Cancelled 


 


Troponin I   


 


Total Protein   


 


Albumin   














  17





  17:00


 


WBC 


 


RBC 


 


Hgb 


 


Hct 


 


MCV 


 


MCHC 


 


RDW 


 


Plt Count 


 


MPV 


 


Neutrophils % 


 


Lymphocytes % 


 


Monocytes % 


 


Eosinophils % 


 


Basophils % 


 


INR 


 


Sodium 


 


Potassium 


 


Chloride 


 


Carbon Dioxide 


 


Anion Gap 


 


BUN 


 


Creatinine 


 


Creat Clearance w eGFR 


 


POC Glucometer 


 


Random Glucose 


 


Calcium 


 


Magnesium 


 


Total Bilirubin 


 


AST 


 


ALT 


 


Alkaline Phosphatase 


 


Creatine Kinase  140


 


Creatine Kinase Index 


 


CK-MB (CK-2) 


 


CK-MB (CK-2) Rel Index 


 


Troponin I  0.18 H D


 


Total Protein 


 


Albumin 











ASSESSMENT/PLAN:


59 y/o male with a PMHx of: IDDM, HTN, HLD, CKD (RTA4),, Cervical Spinal 

Stenosis Neuropathy, R- Foot Drop, Atrophy. Placed in Tele Observation for 

Chest Pain r/o ACS, Elevated Troponin, Hypoglycemia for further evaluation of 

their emergent condition. 





Problem List





- Problem


(1) Chest pain


Assessment/Plan: 


- r/o ACS


- Telemetry 


- HEART Score 5


- Serial Enzymes


- Appreciate Cardiology Consult


- Asa given in ED


- Continue Asa


- BB


- HgbA1C 


- Repeat EKG in am


- Echo


Code(s): R07.9 - CHEST PAIN, UNSPECIFIED   





(2) Hypoglycemia


Assessment/Plan: 


- Likely secondary to medication


- Given D10 in field


- Glucose levels normalized


- Monitor BGMs





Code(s): E16.2 - HYPOGLYCEMIA, UNSPECIFIED





(3) Diabetes mellitus


Assessment/Plan: 


- BGMs


- ISS


- 1800 ADA Diet


Code(s): E11.9 - TYPE 2 DIABETES MELLITUS WITHOUT COMPLICATIONS   





(4) HTN (hypertension)


Assessment/Plan: 


- Monitor BP


- Continue home meds


- Low Na Diet


- Monitor renal function





Code(s): I10 - ESSENTIAL (PRIMARY) HYPERTENSION   





(5) Neuropathy due to type 2 diabetes mellitus


Code(s): E11.40 - TYPE 2 DIABETES MELLITUS WITH DIABETIC NEUROPATHY, UNSP





(6) Depression


Assessment/Plan: 


- Continue home medication


Code(s): F32.9 - MAJOR DEPRESSIVE DISORDER, SINGLE EPISODE, UNSPECIFIED   





(7) CKD (chronic kidney disease)


Assessment/Plan: 


- at baseline 2.5-2.6


- renal US done 2017- reviewed


- Monitor BMP





Code(s): N18.9 - CHRONIC KIDNEY DISEASE, UNSPECIFIED   





(8) Cervical spinal stenosis


Assessment/Plan: 


- Continue home med


Code(s): M48.02 - SPINAL STENOSIS, CERVICAL REGION





(9) DVT prophylaxis


Assessment/Plan: 


- SCDs


- Heparin SQ


Code(s): RIJ6263 - 








Visit type





- Emergency Visit


Emergency Visit: Yes


ED Registration Date: 17


Care time: The patient presented to the Emergency Department on the above date 

and was hospitalized for further evaluation of their emergent condition.





- New Patient


This patient is new to me today: Yes


Date on this admission: 17





- Critical Care


Critical Care patient: No Last Written Prescription Date:  7/11/18  Last Fill Quantity: 30,  # refills: 0   Last office visit: 7/11/2018 with prescribing provider:  Dr Zapien   Future Office Visit:  Unknown  kenny srivastava

## 2018-07-13 RX ORDER — ROPINIROLE 0.25 MG/1
TABLET, FILM COATED ORAL
Qty: 90 TABLET | Refills: 0 | Status: SHIPPED | OUTPATIENT
Start: 2018-07-13 | End: 2018-09-26

## 2018-07-13 NOTE — TELEPHONE ENCOUNTER
"Requested Prescriptions   Pending Prescriptions Disp Refills     rOPINIRole (REQUIP) 0.25 MG tablet [Pharmacy Med Name: ROPINIROLE 0.25MG TABLETS] 90 tablet 0     Sig: TAKE 1 TABLET(0.25 MG) BY MOUTH EVERY NIGHT AS NEEDED    Antiparkinson's Agents Protocol Passed    7/12/2018  4:13 PM       Passed - Blood pressure under 140/90 in past 12 months    BP Readings from Last 3 Encounters:   07/11/18 118/46   07/05/18 131/53   06/21/18 132/59                Passed - CBC on record in past 12 months    Recent Labs   Lab Test  07/05/18   1415   WBC  5.0   RBC  3.52*   HGB  10.2*   HCT  32.7*   PLT  288       For [facility] ONLY: [id number] = WBC, UYGL112 = RBC         Passed - ALT on record in past 12 months        Recent Labs   Lab Test  07/05/18   1415   ALT  32            Passed - Serum Creatinine on file in past 12 months    Recent Labs   Lab Test  07/05/18   1415   CR  0.77            Passed - Patient is age 18 or older       Passed - No active pregnancy on record       Passed - No positive pregnancy test in the past 12 months       Passed - Recent (6 mo) or future (30 days) visit within the authorizing provider's specialty    Patient had office visit in the last 6 months or has a visit in the next 30 days with authorizing provider or within the authorizing provider's specialty.  See \"Patient Info\" tab in inbasket, or \"Choose Columns\" in Meds & Orders section of the refill encounter.              "

## 2018-07-16 ENCOUNTER — APPOINTMENT (OUTPATIENT)
Dept: GENERAL RADIOLOGY | Facility: CLINIC | Age: 83
End: 2018-07-16
Attending: EMERGENCY MEDICINE
Payer: MEDICARE

## 2018-07-16 ENCOUNTER — HOSPITAL ENCOUNTER (EMERGENCY)
Facility: CLINIC | Age: 83
Discharge: HOME | End: 2018-07-16
Attending: EMERGENCY MEDICINE | Admitting: EMERGENCY MEDICINE
Payer: MEDICARE

## 2018-07-16 VITALS
BODY MASS INDEX: 31.93 KG/M2 | HEIGHT: 57 IN | TEMPERATURE: 98.2 F | RESPIRATION RATE: 18 BRPM | DIASTOLIC BLOOD PRESSURE: 64 MMHG | WEIGHT: 148 LBS | SYSTOLIC BLOOD PRESSURE: 148 MMHG | OXYGEN SATURATION: 94 %

## 2018-07-16 DIAGNOSIS — J20.9 ACUTE BRONCHITIS, UNSPECIFIED ORGANISM: ICD-10-CM

## 2018-07-16 LAB
ALBUMIN SERPL-MCNC: 3.1 G/DL (ref 3.4–5)
ALP SERPL-CCNC: 84 U/L (ref 40–150)
ALT SERPL W P-5'-P-CCNC: 32 U/L (ref 0–50)
ANION GAP SERPL CALCULATED.3IONS-SCNC: 5 MMOL/L (ref 3–14)
AST SERPL W P-5'-P-CCNC: 24 U/L (ref 0–45)
BASOPHILS # BLD AUTO: 0 10E9/L (ref 0–0.2)
BASOPHILS NFR BLD AUTO: 0.5 %
BILIRUB SERPL-MCNC: 0.5 MG/DL (ref 0.2–1.3)
BUN SERPL-MCNC: 17 MG/DL (ref 7–30)
CALCIUM SERPL-MCNC: 8.8 MG/DL (ref 8.5–10.1)
CHLORIDE SERPL-SCNC: 105 MMOL/L (ref 94–109)
CO2 SERPL-SCNC: 29 MMOL/L (ref 20–32)
CREAT SERPL-MCNC: 0.63 MG/DL (ref 0.52–1.04)
DIFFERENTIAL METHOD BLD: ABNORMAL
EOSINOPHIL # BLD AUTO: 0.1 10E9/L (ref 0–0.7)
EOSINOPHIL NFR BLD AUTO: 1 %
ERYTHROCYTE [DISTWIDTH] IN BLOOD BY AUTOMATED COUNT: 13 % (ref 10–15)
GFR SERPL CREATININE-BSD FRML MDRD: 89 ML/MIN/1.7M2
GLUCOSE SERPL-MCNC: 96 MG/DL (ref 70–99)
HCT VFR BLD AUTO: 32.7 % (ref 35–47)
HGB BLD-MCNC: 10.4 G/DL (ref 11.7–15.7)
IMM GRANULOCYTES # BLD: 0 10E9/L (ref 0–0.4)
IMM GRANULOCYTES NFR BLD: 0.2 %
INTERPRETATION ECG - MUSE: NORMAL
LYMPHOCYTES # BLD AUTO: 1.7 10E9/L (ref 0.8–5.3)
LYMPHOCYTES NFR BLD AUTO: 28.2 %
MCH RBC QN AUTO: 28.3 PG (ref 26.5–33)
MCHC RBC AUTO-ENTMCNC: 31.8 G/DL (ref 31.5–36.5)
MCV RBC AUTO: 89 FL (ref 78–100)
MONOCYTES # BLD AUTO: 0.7 10E9/L (ref 0–1.3)
MONOCYTES NFR BLD AUTO: 11.5 %
NEUTROPHILS # BLD AUTO: 3.5 10E9/L (ref 1.6–8.3)
NEUTROPHILS NFR BLD AUTO: 58.6 %
NRBC # BLD AUTO: 0 10*3/UL
NRBC BLD AUTO-RTO: 0 /100
NT-PROBNP SERPL-MCNC: 1670 PG/ML (ref 0–1800)
PLATELET # BLD AUTO: 279 10E9/L (ref 150–450)
POTASSIUM SERPL-SCNC: 4.6 MMOL/L (ref 3.4–5.3)
PROT SERPL-MCNC: 6.6 G/DL (ref 6.8–8.8)
RBC # BLD AUTO: 3.68 10E12/L (ref 3.8–5.2)
SODIUM SERPL-SCNC: 139 MMOL/L (ref 133–144)
TROPONIN I BLD-MCNC: 0.01 UG/L (ref 0–0.1)
TROPONIN I SERPL-MCNC: <0.015 UG/L (ref 0–0.04)
WBC # BLD AUTO: 5.9 10E9/L (ref 4–11)

## 2018-07-16 PROCEDURE — 25000125 ZZHC RX 250: Performed by: EMERGENCY MEDICINE

## 2018-07-16 PROCEDURE — 71045 X-RAY EXAM CHEST 1 VIEW: CPT

## 2018-07-16 PROCEDURE — 84484 ASSAY OF TROPONIN QUANT: CPT

## 2018-07-16 PROCEDURE — 83880 ASSAY OF NATRIURETIC PEPTIDE: CPT | Performed by: EMERGENCY MEDICINE

## 2018-07-16 PROCEDURE — 84484 ASSAY OF TROPONIN QUANT: CPT | Mod: 91 | Performed by: EMERGENCY MEDICINE

## 2018-07-16 PROCEDURE — 93005 ELECTROCARDIOGRAM TRACING: CPT | Performed by: EMERGENCY MEDICINE

## 2018-07-16 PROCEDURE — 99284 EMERGENCY DEPT VISIT MOD MDM: CPT | Mod: 25 | Performed by: EMERGENCY MEDICINE

## 2018-07-16 PROCEDURE — 85025 COMPLETE CBC W/AUTO DIFF WBC: CPT | Performed by: EMERGENCY MEDICINE

## 2018-07-16 PROCEDURE — 94640 AIRWAY INHALATION TREATMENT: CPT | Performed by: EMERGENCY MEDICINE

## 2018-07-16 PROCEDURE — 80053 COMPREHEN METABOLIC PANEL: CPT | Performed by: EMERGENCY MEDICINE

## 2018-07-16 RX ORDER — AZITHROMYCIN 250 MG/1
TABLET, FILM COATED ORAL
Qty: 6 TABLET | Refills: 0 | Status: SHIPPED | OUTPATIENT
Start: 2018-07-16 | End: 2019-01-30

## 2018-07-16 RX ORDER — ALBUTEROL SULFATE 0.83 MG/ML
2.5 SOLUTION RESPIRATORY (INHALATION) ONCE
Status: COMPLETED | OUTPATIENT
Start: 2018-07-16 | End: 2018-07-16

## 2018-07-16 RX ADMIN — ALBUTEROL SULFATE 2.5 MG: 2.5 SOLUTION RESPIRATORY (INHALATION) at 08:58

## 2018-07-16 ASSESSMENT — ENCOUNTER SYMPTOMS
SHORTNESS OF BREATH: 1
COUGH: 1
ABDOMINAL PAIN: 0
WEAKNESS: 1
WHEEZING: 1

## 2018-07-16 NOTE — ED AVS SNAPSHOT
[facility], Emergency Department    [address] [address]    Phone:  [phone]                                       Dimple Oviedo   MRN: [id number]    Department:  [facility], Emergency Department   Date of Visit:  7/16/2018           Patient Information     Date Of Birth          6/23/1933        Your diagnoses for this visit were:     Acute bronchitis, unspecified organism        You were seen by Job Somers DO.      Follow-up Information     Follow up with Pop Zapien MD In 1 day.    Specialty:  Family Practice    Contact information:    [address] [facility] [address] [phone]          Discharge Instructions       See your doctor tomorrow.  Return to the ED immediately if you have chest pain, shortness of breath, fever, sick.    Your next 10 appointments already scheduled     Jul 23, 2018 11:00 AM CDT   LAB with  LAB   [facility] ([facility])    [address] [place] [address]  [facility] [id number] [phone]           Please do not eat 10-12 hours before your appointment if you are coming in fasting for labs on lipids, cholesterol, or glucose (sugar). This does not apply to pregnant women. Water, hot tea and black coffee (with nothing added) are okay. Do not drink other fluids, diet soda or chew gum.            Jul 23, 2018 11:30 AM CDT   (Arrive by 11:15 AM)   CORE RETURN with NELSON Smart CNP   [facility] ([facility])    [address] [place] [address]  [address]  [facility] [id number] [phone]            Jul 26, 2018  2:20 PM CDT   (Arrive by 2:05 PM)   Return Visit with Stuart King MD   [facility] ([facility])    [address]  4th Floor  [facility] [id number] [phone]            Aug 06, 2018 12:00 PM CDT   (Arrive by 11:45 AM)   Lymphedema Treatment with Estela YANG  Abe Lares PT   [facility] ([facility])    [address]  [address]  [facility] [address]   [phone]            Aug 08, 2018 10:00 AM CDT   (Arrive by 9:45 AM)   Lymphedema Treatment with Crista Jain PT   [facility] [place] ([facility])    [address]  [address]  [facility] [address]   [phone]            Aug 10, 2018 12:30 PM CDT   (Arrive by 12:15 PM)   Lymphedema Treatment with Crista Jain PT   [facility] [place] ([facility])    [address]  [address]  [facility] [address]   [phone]            Aug 13, 2018 12:00 PM CDT   (Arrive by 11:45 AM)   Lymphedema Treatment with Estela Lares PT   [facility] [place] ([facility])    [address]  [address]  [facility] [address]   [phone]            Aug 14, 2018  4:00 PM CDT   (Arrive by 3:45 PM)   RETURN ENDOCRINE with Dhara Meza MD   [facility] ([facility])    [address]  3rd Floor  [facility] [address]   [phone]            Aug 15, 2018 12:15 PM CDT   (Arrive by 12:00 PM)   Lymphedema Treatment with Crista Jain PT   [facility] [place] ([facility])    [address]  [address]  [facility] [address]   [phone]            Aug 17, 2018 12:30 PM CDT   (Arrive by 12:15 PM)   Lymphedema Treatment with Crista Jain PT   [facility] [place] ([facility])    [address]  [address]  [facility] [address]   [phone]              24 Hour Appointment Hotline       To make an appointment at any [facility], call [phone] ([phone]). If you don't have a family doctor or clinic, we will help you find one. [facility] are conveniently located to  "serve the needs of you and your family.             Review of your medicines      START taking        Dose / Directions Last dose taken    azithromycin 250 MG tablet   Commonly known as:  ZITHROMAX Z-LUZ   Quantity:  6 tablet        Two tablets on the first day, then one tablet daily for the next 4 days   Refills:  0          Our records show that you are taking the medicines listed below. If these are incorrect, please call your family doctor or clinic.        Dose / Directions Last dose taken    acetaminophen 650 MG 8 hour tablet   Dose:  650 mg   Quantity:  100 tablet        Take 650 mg by mouth every 8 hours as needed for mild pain or fever   Refills:  0        alendronate 70 MG tablet   Commonly known as:  FOSAMAX   Quantity:  12 tablet        TAKE 1 TABLET EVERY 7 DAYS AT LEAST 60 MINUTES BEFORE BREAKFAST AS DIRECTED \"SEE PACKAGE FOR ADDITIONAL INSTRUCTIONS\"   Refills:  0        ASPIRIN PO   Dose:  81 mg        Take 81 mg by mouth At Bedtime   Refills:  0        CALCIUM + D PO        Take by mouth 2 times daily   Refills:  0        colestipol 1 g tablet   Commonly known as:  COLESTID        TK 1 T PO BID - TAKE OTHER MEDS 1 HOUR BEFORE OR 4 HOURS AFTER COLESID   Refills:  2        CRANBERRY CONCENTRATE PO   Dose:  2 capsule        Take 2 capsules by mouth At Bedtime   Refills:  0        cycloSPORINE 0.05 % ophthalmic emulsion   Commonly known as:  RESTASIS   Dose:  1 drop        Place 1 drop into both eyes 2 times daily   Refills:  0        Fish Oil 500 MG Caps   Dose:  1 capsule        Take 1 capsule by mouth 2 times daily   Refills:  0        furosemide 20 MG tablet   Commonly known as:  LASIX   Dose:  20 mg        Take 1 tablet (20 mg) by mouth every morning   Refills:  0        irbesartan 300 MG tablet   Commonly known as:  AVAPRO   Quantity:  90 tablet        TAKE 1 TABLET EVERY DAY   Refills:  2        lovastatin 40 MG tablet   Commonly known as:  MEVACOR   Quantity:  90 tablet        TAKE 1 TABLET AT " BEDTIME (HYPERLIPIDEMIA LDL GOAL BELOW 130)   Refills:  2        Melatonin 10 MG Tabs tablet   Dose:  10 mg        Take 10 mg by mouth nightly as needed for sleep   Refills:  0        METAMUCIL FIBER PO        Take by mouth 3 times daily   Refills:  0        methimazole 5 MG tablet   Commonly known as:  TAPAZOLE   Dose:  5 mg   Quantity:  90 tablet        Take 1 tablet (5 mg) by mouth daily   Refills:  1        nitroGLYcerin 0.4 MG sublingual tablet   Commonly known as:  NITROSTAT   Quantity:  25 tablet        For chest pain place 1 tablet under the tongue every 5 minutes for 3 doses. If symptoms persist 5 minutes after 1st dose call 911.   Refills:  3        omeprazole 20 MG CR capsule   Commonly known as:  priLOSEC   Dose:  20 mg   Quantity:  180 capsule        Take 1 capsule (20 mg) by mouth daily   Refills:  3        propranolol 60 MG 24 hr capsule   Commonly known as:  INDERAL LA   Dose:  60 mg   Quantity:  90 capsule        Take 1 capsule (60 mg) by mouth daily   Refills:  1        rOPINIRole 0.25 MG tablet   Commonly known as:  REQUIP   Quantity:  90 tablet        TAKE 1 TABLET(0.25 MG) BY MOUTH EVERY NIGHT AS NEEDED   Refills:  0        sertraline 100 MG tablet   Commonly known as:  ZOLOFT   Dose:  100 mg   Quantity:  90 tablet        Take 1 tablet (100 mg) by mouth daily   Refills:  1        SYSTANE 0.4-0.3 % Soln ophthalmic solution   Dose:  1 drop   Generic drug:  polyethylene glycol 0.4%- propylene glycol 0.3%        Place 1 drop into both eyes 3 times daily as needed for dry eyes   Refills:  0        traZODone 50 MG tablet   Commonly known as:  DESYREL   Quantity:  90 tablet        TAKE 1 TABLET(50 MG) BY MOUTH EVERY NIGHT AS NEEDED FOR SLEEP   Refills:  1        VALIUM PO   Dose:  2 mg        Take 2 mg by mouth every 12 hours as needed for anxiety   Refills:  0                Prescriptions were sent or printed at these locations (1 Prescription)                   Other Prescriptions                 Printed at Department/Unit printer (1 of 1)         azithromycin (ZITHROMAX Z-LUZ) 250 MG tablet                Procedures and tests performed during your visit     Procedure/Test Number of Times Performed    CBC with platelets differential 1    Cardiac Continuous Monitoring 1    Cardiac Respiratory Monitor 1    Comprehensive metabolic panel 1    EKG 12-lead, tracing only 2    ISTAT troponin nursing POCT 1    Nt probnp inpatient 1    Peripheral IV: Standard 1    Pulse oximetry nursing 2    Troponin I 1    Troponin POCT 1    XR Chest Port 1 View 1      Orders Needing Specimen Collection     None      Pending Results     Date and Time Order Name Status Description    7/16/2018 0849 XR Chest Port 1 View Preliminary             Pending Culture Results     No orders found from 7/14/2018 to 7/17/2018.            Pending Results Instructions     If you had any lab results that were not finalized at the time of your Discharge, you can call the ED Lab Result RN at [phone]. You will be contacted by this team for any positive Lab results or changes in treatment. The nurses are available 7 days a week from 10A to 6:30P.  You can leave a message 24 hours per day and they will return your call.        Thank you for choosing [facility]       Thank you for choosing [facility] for your care. Our goal is always to provide you with excellent care. Hearing back from our patients is one way we can continue to improve our services. Please take a few minutes to complete the written survey that you may receive in the mail after you visit with us. Thank you!        Anywhere to [facility] Information     [facility] gives you secure access to your electronic health record. If you see a primary care provider, you can also send messages to your care team and make appointments. If you have questions, please call your primary care clinic.  If you do not have a primary care provider, please call [phone] and they will assist you.        Care EveryWhere ID      This is your Care EveryWhere ID. This could be used by other organizations to access your [facility] medical records  [id number]        Equal Access to Services     GUNNER RODRIGUEZ : Aleja Pollack, nadia barnard, henrique chávez, maldonado bey. So [facility] [phone].    ATENCIÓN: Si habla español, tiene a sierra disposición servicios gratuitos de asistencia lingüística. Llame al [phone].    We comply with applicable federal civil rights laws and [place] laws. We do not discriminate on the basis of race, color, national origin, age, disability, sex, sexual orientation, or gender identity.            After Visit Summary       This is your record. Keep this with you and show to your community pharmacist(s) and doctor(s) at your next visit.

## 2018-07-16 NOTE — PROGRESS NOTES
"Emergency Social Work Services Note    Date of  Intervention: 07/16/18  Last Emergency Department Visit:  03/14/18  Care Plan:  None  Collaborated with:  Pt, pt's daughter, pt's son-in-law and daughter-in-law    Data:  Pt is a 86 yo female who presented to ED for eval of shortness of breath.  SW received referral from ED RN to discuss home care options per family request.    Intervention:  SW met with pt, pt's daughter, Nidia ([phone]), Nidia's spouse, and pt's daughter-in-law, Day ([phone]).  Pt is currently living at home alone and is independent at baseline.  Pt denied any need for in-home services, however pt's family expressed interest in getting pt connected with more support.  Pt's daughter, Nidia, and her spouse live in [place], and therefore unable to assist with day-to-day needs, such as transportation.  Pt's son and daughter-in-law live locally and appear to be primary in-town contacts and supports.    SW provided pt/family with education on skilled in-home care and non-skilled in-home care (including private pay options).  Pt refusing HHC at this time because she does not feel that she is home-bound, and does not want to feel trapped at home.  Pt expressed interest in transportation support since it has become more challenging for her to drive some days.  Pt does not appear to have transportation benefits through her insurance, and JUANITA explained [employer] resource.  Pt stated that she is already familiar with this, and denied it as an option since rides \"take all day.\"  Offered private pay home care as a possible option for pt, and support could include transportation assistance.  Pt/family explained that pt does have a LTC insurance plan but were unsure of the details of it.  SW encouraged pt/family to inquire about benefits of pt's LTC insurance plan with private pay agencies as they can assist with explaining how much coverage pt would have from this.  Pt/family requested a " list of private pay agencies which JUANITA provided from the [employer].  Reinforced no [facility] affiliation with these companies and pt choice for preference, and explained that pricing can vary, so encouraged calling more than one agency for comparison of services; family verbalized understanding.  Also provided Nidia with phone number to [employer] ([phone]) as she expressed challenges of living out out of town and feeling unfamiliar with local resources.  SW encouraged pt/family to contact PCP if pt changes mind and interested in [facility] referral.  Pt/family denied any further questions at this time, and family to provide transportation home.    Assessment:  Pt appears resistant to in-home support (skilled and unskilled), and pt's family appears to want this more for pt.  Pt and family accepting of education on available resources and appear willing and able to f/u with pt's PCP as needed.    Plan:    Anticipated Disposition:  Home, no needs identified    Barriers to d/c plan:  None    Follow Up:  None    [facility]  Emergency Department   Phone: [phone]  Pager: [phone]  On-call pager: [phone] (1600 to midnight)

## 2018-07-16 NOTE — ED AVS SNAPSHOT
[facility], [place], Emergency Department    [address] [place] [id number]    Phone:  [phone]                                       Dimple Oviedo   MRN: [id number]    Department:  [facility], Emergency Department   Date of Visit:  7/16/2018           After Visit Summary Signature Page     I have received my discharge instructions, and my questions have been answered. I have discussed any challenges I see with this plan with the nurse or doctor.    ..........................................................................................................................................  Patient/Patient Representative Signature      ..........................................................................................................................................  Patient Representative Print Name and Relationship to Patient    ..................................................               ................................................  Date                                            Time    ..........................................................................................................................................  Reviewed by Signature/Title    ...................................................              ..............................................  Date                                                            Time

## 2018-07-16 NOTE — ED PROVIDER NOTES
History     Chief Complaint   Patient presents with     Shortness of Breath     SOB since last night, increased with activity, cough, wheezing      HPI  Dimple Oviedo is a 85 year old female with a history of GERD, HLD, malignant melanoma, polymyalgia rheumatica, stress-induced cardiomyopathy, and HTN who presents for evaluation of shortness of breath. Patient complains of a cough as well as shortness of breath with associated wheezing that began last night. She also reports generalized weakness. She denies chest pain or abdominal pain. She has chronic bilateral lower extremity lymphedema that is reportedly unchanged from baseline. She does not smoke or drink alcohol. Her daughter notes the patient has been under increased stress recently due to having lots of people visiting her.     I have reviewed the Medications, Allergies, Past Medical and Surgical History, and Social History in the Well Done system.  Past Medical History:   Diagnosis Date     Calculus of kidney      Esophageal reflux      GERD (gastroesophageal reflux disease)      Hyperlipidemia LDL goal <130 5/9/2010     Malignant melanoma of skin of trunk, except scrotum (H)      Nonspecific abnormal finding     has living will 2004 -      Nontoxic multinodular goiter     no further eval /tx rec per pt     Osteopenia      Other psoriasis      Personal history of colonic polyps      PMR (polymyalgia rheumatica) (H)      Stress-induced cardiomyopathy      Undiagnosed cardiac murmurs      Unspecified constipation      Unspecified essential hypertension        Past Surgical History:   Procedure Laterality Date     BIOPSY       C NONSPECIFIC PROCEDURE  2005    colonoscopy polyp repeat 2010     COLONOSCOPY  2014     COMBINED CYSTOSCOPY, RETROGRADES, URETEROSCOPY, INSERT STENT Bilateral 4/3/2018    Procedure: COMBINED CYSTOSCOPY, RETROGRADES, URETEROSCOPY, INSERT STENT;;  Surgeon: Stuart King MD;  Location: [facility] OR     CYSTOSCOPY, BIOPSY BLADDER INSTILL  OPTICAL AGENT N/A 4/3/2018    Procedure: CYSTOSCOPY, BIOPSY BLADDER INSTILL OPTICAL AGENT;  Cystoscopy, Blue Light Cystoscopy, Bladder Biopsies, Bilateral Selective ureteral washings for Cytology, Bilateral Retrograde Pyelograms, Bilateral Ureteroscopy;  Surgeon: Stuart King MD;  Location: [facility] OR     CYSTOSCOPY, BIOPSY BLADDER, COMBINED N/A 2/19/2018    Procedure: COMBINED CYSTOSCOPY, BIOPSY BLADDER;  Cystoscopy, Bladder Biopsy;  Surgeon: Kenna La MD;  Location: [facility] OR     ENDOSCOPIC ULTRASOUND LOWER GASTROINTESTIONAL TRACT (GI) N/A 10/30/2015    Procedure: ENDOSCOPIC ULTRASOUND LOWER GASTROINTESTIONAL TRACT (GI);  Surgeon: Daniel Jean-Baptiste MD;  Location: [facility] OR     EYE SURGERY  12/4/17     LAPAROSCOPIC CHOLECYSTECTOMY WITH CHOLANGIOGRAMS N/A 11/1/2015    Procedure: LAPAROSCOPIC CHOLECYSTECTOMY WITH CHOLANGIOGRAMS;  Surgeon: Tonie [place] MD;  Location: [facility] OR     SURGICAL HISTORY OF -   1996    malignant melanoma     SURGICAL HISTORY OF -   1968    thyroid nodule     SURGICAL HISTORY OF -       D & C       Family History   Problem Relation Age of Onset     Cancer Father      dec - esophageal and laryngeal     HEART DISEASE Mother      Respiratory Mother      dec     Breast Cancer Daughter      Other Cancer Daughter      Thyroid Disease Daughter      Asthma Daughter      Hyperlipidemia Son      Diabetes Son        Social History   Substance Use Topics     Smoking status: Never Smoker     Smokeless tobacco: Never Used     Alcohol use No      Comment: 6 times per year-one drink       No current facility-administered medications for this encounter.      Current Outpatient Prescriptions   Medication     azithromycin (ZITHROMAX Z-LUZ) 250 MG tablet     DiazePAM (VALIUM PO)     acetaminophen 650 MG TABS     alendronate (FOSAMAX) 70 MG tablet     ASPIRIN PO     Calcium Citrate-Vitamin D (CALCIUM + D PO)     colestipol (COLESTID) 1 g tablet     CRANBERRY CONCENTRATE PO     cycloSPORINE  "(RESTASIS) 0.05 % ophthalmic emulsion     furosemide (LASIX) 20 MG tablet     irbesartan (AVAPRO) 300 MG tablet     lovastatin (MEVACOR) 40 MG tablet     Melatonin 10 MG TABS tablet     methimazole (TAPAZOLE) 5 MG tablet     nitroGLYcerin (NITROSTAT) 0.4 MG sublingual tablet     Omega-3 Fatty Acids (FISH OIL) 500 MG CAPS     omeprazole (PRILOSEC) 20 MG CR capsule     polyethylene glycol 0.4%- propylene glycol 0.3% (SYSTANE) 0.4-0.3 % SOLN ophthalmic solution     propranolol (INDERAL LA) 60 MG 24 hr capsule     Psyllium (METAMUCIL FIBER PO)     rOPINIRole (REQUIP) 0.25 MG tablet     sertraline (ZOLOFT) 100 MG tablet     traZODone (DESYREL) 50 MG tablet        Allergies   Allergen Reactions     No Known Drug Allergies        Review of Systems   Respiratory: Positive for cough, shortness of breath and wheezing.    Cardiovascular: Negative for chest pain.   Gastrointestinal: Negative for abdominal pain.   Neurological: Positive for weakness (generalized).   All other systems reviewed and are negative.      Physical Exam   BP: 143/52  Heart Rate: 74  Temp: 98.2  F (36.8  C)  Resp: 20  Height: 144.8 cm (4' 9\")  Weight: 67.1 kg (148 lb)  SpO2: 97 %      Physical Exam  General: patient is alert and oriented and in no acute distress   Head: atraumatic and normocephalic   EENT: moist mucus membranes without tonsillar erythema or exudates, pupils round and reactive   Neck: supple   Cardiovascular: regular rate and rhythm, no murmur   EXT: chronic bilateral lower extremity edema, no tenderness, extremities warm and well perfused  Pulmonary: lungs clear to auscultation bilaterally   Abdomen: soft, non-tender   Musculoskeletal: normal range of motion   Neurological: alert and oriented, moving all extremities symmetrically, gait normal   Skin: warm, dry   ED Course patient stated that she is feeling better in the ED.  No chest pain.  No shortness of breath.  She wants to go home.  Chest x-ray showed no pneumonia or pulmonary " edema.  Patient is told to follow-up with her doctor tomorrow.  We will give her some Zithromax and albuterol for bronchitis.  Patient is told to return to the ED immediately if she start having chest pain shortness of breath or fever     ED Course     Procedures       8:44 AM  The patient was seen and examined by Dr. Herrera in Room 8.   Results for orders placed or performed during the hospital encounter of 07/16/18   XR Chest Port 1 View    Narrative    No focal airspace opacity.   CBC with platelets differential   Result Value Ref Range    WBC 5.9 4.0 - 11.0 10e9/L    RBC Count 3.68 (L) 3.8 - 5.2 10e12/L    Hemoglobin 10.4 (L) 11.7 - 15.7 g/dL    Hematocrit 32.7 (L) 35.0 - 47.0 %    MCV 89 78 - 100 fl    MCH 28.3 26.5 - 33.0 pg    MCHC 31.8 31.5 - 36.5 g/dL    RDW 13.0 10.0 - 15.0 %    Platelet Count 279 150 - 450 10e9/L    Diff Method Automated Method     % Neutrophils 58.6 %    % Lymphocytes 28.2 %    % Monocytes 11.5 %    % Eosinophils 1.0 %    % Basophils 0.5 %    % Immature Granulocytes 0.2 %    Nucleated RBCs 0 0 /100    Absolute Neutrophil 3.5 1.6 - 8.3 10e9/L    Absolute Lymphocytes 1.7 0.8 - 5.3 10e9/L    Absolute Monocytes 0.7 0.0 - 1.3 10e9/L    Absolute Eosinophils 0.1 0.0 - 0.7 10e9/L    Absolute Basophils 0.0 0.0 - 0.2 10e9/L    Abs Immature Granulocytes 0.0 0 - 0.4 10e9/L    Absolute Nucleated RBC 0.0    Nt probnp inpatient   Result Value Ref Range    N-Terminal Pro BNP Inpatient 1670 0 - 1800 pg/mL   Troponin I   Result Value Ref Range    Troponin I ES <0.015 0.000 - 0.045 ug/L   Comprehensive metabolic panel   Result Value Ref Range    Sodium 139 133 - 144 mmol/L    Potassium 4.6 3.4 - 5.3 mmol/L    Chloride 105 94 - 109 mmol/L    Carbon Dioxide 29 20 - 32 mmol/L    Anion Gap 5 3 - 14 mmol/L    Glucose 96 70 - 99 mg/dL    Urea Nitrogen 17 7 - 30 mg/dL    Creatinine 0.63 0.52 - 1.04 mg/dL    GFR Estimate 89 >60 mL/min/1.7m2    GFR Estimate If Black >90 >60 mL/min/1.7m2    Calcium 8.8 8.5 - 10.1  mg/dL    Bilirubin Total 0.5 0.2 - 1.3 mg/dL    Albumin 3.1 (L) 3.4 - 5.0 g/dL    Protein Total 6.6 (L) 6.8 - 8.8 g/dL    Alkaline Phosphatase 84 40 - 150 U/L    ALT 32 0 - 50 U/L    AST 24 0 - 45 U/L   EKG 12-lead, tracing only   Result Value Ref Range    Interpretation ECG Click View Image link to view waveform and result    Troponin POCT   Result Value Ref Range    Troponin I 0.01 0.00 - 0.10 ug/L     Chest xr is normal. No edema, no consolidation       EKG Interpretation:      Interpreted by Job Herrera DO  Time reviewed: 08:30  Symptoms at time of EKG: shortness of breath   Rhythm: normal sinus with sinus arrhythmia  Rate: 74 bpm  Axis: Left Axis Deviation  Ectopy: none  Conduction: moderate voltage criteria for LVH (may be normal variant)  ST Segments/ T Waves: No ST-T wave changes  Q Waves: none  Comparison to prior: Unchanged from 05/21/18    Clinical Impression: normal ekg           Labs Ordered and Resulted from Time of ED Arrival Up to the Time of Departure from the ED   CBC WITH PLATELETS DIFFERENTIAL - Abnormal; Notable for the following:        Result Value    RBC Count 3.68 (*)     Hemoglobin 10.4 (*)     Hematocrit 32.7 (*)     All other components within normal limits   COMPREHENSIVE METABOLIC PANEL - Abnormal; Notable for the following:     Albumin 3.1 (*)     Protein Total 6.6 (*)     All other components within normal limits   NT PROBNP INPATIENT   TROPONIN I   ISTAT TROPONIN NURSING POCT   PULSE OXIMETRY NURSING   CARDIAC RESPIRATORY MONITOR   PULSE OXIMETRY NURSING   CARDIAC CONTINUOUS MONITORING   PERIPHERAL IV CATHETER   TROPONIN POCT            Assessments & Plan (with Medical Decision Making): Bronchitis, cough.  To follow-up with her doctor see above.  Patient told to return here if chest pain shortness of breath.       I have reviewed the nursing notes.    I have reviewed the findings, diagnosis, plan and need for follow up with the patient.    New Prescriptions    AZITHROMYCIN  (ZITHROMAX Z-LUZ) 250 MG TABLET    Two tablets on the first day, then one tablet daily for the next 4 days       Final diagnoses:   Acute bronchitis, unspecified organism   IKimberlee, am serving as a trained medical scribe to document services personally performed by Job Somers DO, based on the provider's statements to me.      Job INFANTE DO, was physically present and have reviewed and verified the accuracy of this note documented by Kimberlee Zhang.      7/16/2018   [facility], [place], EMERGENCY DEPARTMENT     Job Somers DO  07/16/18 1040

## 2018-07-16 NOTE — ED TRIAGE NOTES
Patient reports SOB since last night, non productive cough, increase SOB with activity. Denies increase in edema and fever.

## 2018-07-16 NOTE — DISCHARGE INSTRUCTIONS
See your doctor tomorrow.  Return to the ED immediately if you have chest pain, shortness of breath, fever, sick.

## 2018-07-16 NOTE — ED NOTES
Asked social work to speak with patient and family regarding home care options per family request.

## 2018-07-17 ENCOUNTER — OFFICE VISIT (OUTPATIENT)
Dept: FAMILY MEDICINE | Facility: CLINIC | Age: 83
End: 2018-07-17
Payer: MEDICARE

## 2018-07-17 VITALS
TEMPERATURE: 98.5 F | OXYGEN SATURATION: 96 % | DIASTOLIC BLOOD PRESSURE: 50 MMHG | RESPIRATION RATE: 20 BRPM | SYSTOLIC BLOOD PRESSURE: 125 MMHG | WEIGHT: 148.5 LBS | HEART RATE: 68 BPM | BODY MASS INDEX: 32.04 KG/M2 | HEIGHT: 57 IN

## 2018-07-17 DIAGNOSIS — M16.10 HIP ARTHRITIS: ICD-10-CM

## 2018-07-17 DIAGNOSIS — D64.9 NORMOCYTIC ANEMIA: ICD-10-CM

## 2018-07-17 DIAGNOSIS — E05.90 HYPERTHYROIDISM: ICD-10-CM

## 2018-07-17 DIAGNOSIS — I50.30 (HFPEF) HEART FAILURE WITH PRESERVED EJECTION FRACTION (H): ICD-10-CM

## 2018-07-17 DIAGNOSIS — J20.9 ACUTE BRONCHITIS, UNSPECIFIED ORGANISM: Primary | ICD-10-CM

## 2018-07-17 DIAGNOSIS — I87.303 STASIS EDEMA OF BOTH LOWER EXTREMITIES: ICD-10-CM

## 2018-07-17 DIAGNOSIS — I50.22 CHRONIC SYSTOLIC HEART FAILURE (H): ICD-10-CM

## 2018-07-17 LAB
ANION GAP SERPL CALCULATED.3IONS-SCNC: 6 MMOL/L (ref 3–14)
BUN SERPL-MCNC: 19 MG/DL (ref 7–30)
CALCIUM SERPL-MCNC: 8.8 MG/DL (ref 8.5–10.1)
CHLORIDE SERPL-SCNC: 103 MMOL/L (ref 94–109)
CO2 SERPL-SCNC: 28 MMOL/L (ref 20–32)
CREAT SERPL-MCNC: 0.68 MG/DL (ref 0.52–1.04)
GFR SERPL CREATININE-BSD FRML MDRD: 82 ML/MIN/1.7M2
GLUCOSE SERPL-MCNC: 127 MG/DL (ref 70–99)
LDH SERPL L TO P-CCNC: 204 U/L (ref 81–234)
POTASSIUM SERPL-SCNC: 4.1 MMOL/L (ref 3.4–5.3)
SODIUM SERPL-SCNC: 137 MMOL/L (ref 133–144)
T3 SERPL-MCNC: 208 NG/DL (ref 60–181)
T4 FREE SERPL-MCNC: 2.04 NG/DL (ref 0.76–1.46)
TSH SERPL DL<=0.005 MIU/L-ACNC: <0.01 MU/L (ref 0.4–4)

## 2018-07-17 PROCEDURE — 83615 LACTATE (LD) (LDH) ENZYME: CPT | Performed by: FAMILY MEDICINE

## 2018-07-17 PROCEDURE — 84439 ASSAY OF FREE THYROXINE: CPT | Performed by: FAMILY MEDICINE

## 2018-07-17 PROCEDURE — 82728 ASSAY OF FERRITIN: CPT | Performed by: FAMILY MEDICINE

## 2018-07-17 PROCEDURE — 84480 ASSAY TRIIODOTHYRONINE (T3): CPT | Performed by: FAMILY MEDICINE

## 2018-07-17 PROCEDURE — 83010 ASSAY OF HAPTOGLOBIN QUANT: CPT | Performed by: FAMILY MEDICINE

## 2018-07-17 PROCEDURE — 84443 ASSAY THYROID STIM HORMONE: CPT | Performed by: FAMILY MEDICINE

## 2018-07-17 PROCEDURE — 80048 BASIC METABOLIC PNL TOTAL CA: CPT | Performed by: FAMILY MEDICINE

## 2018-07-17 PROCEDURE — 99214 OFFICE O/P EST MOD 30 MIN: CPT | Performed by: FAMILY MEDICINE

## 2018-07-17 PROCEDURE — 36415 COLL VENOUS BLD VENIPUNCTURE: CPT | Performed by: FAMILY MEDICINE

## 2018-07-17 PROCEDURE — 82746 ASSAY OF FOLIC ACID SERUM: CPT | Performed by: FAMILY MEDICINE

## 2018-07-17 NOTE — PROGRESS NOTES
SUBJECTIVE:   Dimple Oviedo is a 85 year old female who presents to clinic today for the following health issues:    ED/UC Followup:    Facility:  [facility]  Date of visit: 07/16/2018  Reason for visit: Acute bronchitis, unspecified organism  Current Status: no coughing, SOB with walking, wheezing yesterday, was given Zithromax and says seems to be helping      Was given zpack. Feeling better with better.     No fever. No blood in urine.     Knee pain - better.     Daughter is visiting her [place] and she needs to get be on her chart as per patient.     Using walker. 1/2 block walking without stopping. Cant walk more than 3 blocks. Gets out of breath.     Problem list and histories reviewed & adjusted, as indicated.  Additional history: as documented    Labs reviewed in EPIC    Reviewed and updated as needed this visit by clinical staff    Reviewed and updated as needed this visit by Provider      Social History     Social History     Marital status:      Spouse name:      Number of children: 2     Years of education: N/A     Occupational History      Retired     Social History Main Topics     Smoking status: Never Smoker     Smokeless tobacco: Never Used     Alcohol use No      Comment: 6 times per year-one drink     Drug use: No     Sexual activity: Yes     Partners: Male     Other Topics Concern      Service No     Blood Transfusions No     Exercise Yes     curves     Seat Belt Yes     Self-Exams Yes     Parent/Sibling W/ Cabg, Mi Or Angioplasty Before 65f 55m? No     Social History Narrative    December 7, 2009    Balanced Diet - Yes    Osteoporosis Prevention Measures - Dairy servings per day: 2 and Medication/Supplements (See current meds)    Regular Exercise -  Yes Describe curves, walking    Dental Exam - YES - Date: 10/2009    Eye Exam - YES - Date: 2008    Self Breast Exam - Yes    Abuse: Current or Past (Physical, Sexual or Emotional)- No    Do you feel safe in your environment -  "Yes    Guns stored in the home - No    Sunscreen used - Yes    Seatbelts used - Yes    Lipids -  YES - Date: 11/24/2009    Glucose -  YES - Date: 11/24/2009    Colon Cancer Screening - Colonoscopy 11/18/2005(date completed)    Hemoccults - YES - Date: 10/12/2004    Pap Test -  YES - Date: 09/22/2004    Do you have any concerns about STD's -  No    Mammography - YES - Date: 11/24/2009    DEXA - YES - Date: 11/20/2008    Immunizations reviewed and up to date - Yes    PAULETTE Spencer CMA                 Allergies   Allergen Reactions     No Known Drug Allergies      Patient Active Problem List   Diagnosis     Esophageal reflux     Restless leg syndrome     heat intolerance     Goiter     Disorder of bone and cartilage     Other psoriasis     perirectal cyst     Malignant melanoma of skin of trunk, except scrotum (H)     Nontoxic multinodular goiter     Hyperlipidemia LDL goal <130     Hypertension goal BP (blood pressure) < 140/90     Urinary incontinence     Osteoarthritis of left knee     Hip arthritis     Polymyalgia rheumatica (H)     High risk medication use     Shoulder pain     Impaired fasting blood sugar     Chronic bilateral low back pain without sciatica     Obesity, unspecified obesity severity, unspecified obesity type     Iron deficiency anemia, unspecified iron deficiency anemia type     NSTEMI (non-ST elevated myocardial infarction) (H)     Stress-induced cardiomyopathy     A-fib (H)     Anxiety     Chronic systolic heart failure (H)     Urothelial carcinoma (H)     Hyperthyroidism     Restless legs syndrome     Reviewed medications, social history and  past medical and surgical history.    Review of system: for general, respiratory, CVS, GI and psychiatry negative except for noted above.     EXAM:  /50 (BP Location: Left arm, Patient Position: Sitting, Cuff Size: Adult Regular)  Pulse 68  Temp 98.5  F (36.9  C) (Oral)  Resp 20  Ht 4' 9\" (1.448 m)  Wt 148 lb 8 oz (67.4 kg)  SpO2 96%  BMI 32.14 " kg/m2  Constitutional: healthy, alert and no distress   Psychiatric: mentation appears normal and affect normal/bright  Cardiovascular: RRR. No murmurs,  Respiratory: negative, Lungs clear. No crackles or wheezing. No tachypnea.    using cane     ASSESSMENT / PLAN:   (J20.9) Acute bronchitis, unspecified organism  (primary encounter diagnosis)  Comment: improving. Reviewed ER notes. Completing antibiotics  Plan: less likely cardiac in origin.     Chronic systolic heart failure  Comment - seeing cardiologist.  rx as per cardiology. See below    Hip arthritis  Comment - using cane  Plan - due to her CHF and hip OA, she may be a reasonable candidate for [employer]. OK to fill out form once she drops it to our clinic. She can not walk more than 1/2 block right now without stopping.

## 2018-07-17 NOTE — MR AVS SNAPSHOT
After Visit Summary   7/17/2018    Dimple Oviedo    MRN: [id number]           Patient Information     Date Of Birth          6/23/1933        Visit Information        Provider Department      7/17/2018 1:20 PM Pop Zapien MD [facility]        Today's Diagnoses     Acute bronchitis, unspecified organism    -  1    Chronic systolic heart failure (H)        Hip arthritis           Follow-ups after your visit        Your next 10 appointments already scheduled     Jul 23, 2018 11:00 AM CDT   LAB with  LAB   [facility] ([facility])    [address] [place] [address]  1st Floor  [facility] [id number] [phone]           Please do not eat 10-12 hours before your appointment if you are coming in fasting for labs on lipids, cholesterol, or glucose (sugar). This does not apply to pregnant women. Water, hot tea and black coffee (with nothing added) are okay. Do not drink other fluids, diet soda or chew gum.            Jul 23, 2018 11:30 AM CDT   (Arrive by 11:15 AM)   CORE RETURN with NELSON Smart CNP   [facility] ([facility])    [address] [place] [address]  [address]  [facility] [address] [phone]            Jul 26, 2018  2:20 PM CDT   (Arrive by 2:05 PM)   Return Visit with Stuart King MD   [facility] ([facility])    [address] [place] [address]  4th Floor  [facility] [address] [phone]            Aug 06, 2018 12:00 PM CDT   (Arrive by 11:45 AM)   Lymphedema Treatment with Estela Lares PT   [facility] ([facility])    [address] [place] [address]  [address]  [facility] [address] [phone]            Aug 08, 2018 10:00 AM CDT   (Arrive by 9:45 AM)   Lymphedema Treatment with Crista Jain PT   [facility] ([facility])     [address]  [address]  [facility] [address]   [phone]            Aug 10, 2018 12:30 PM CDT   (Arrive by 12:15 PM)   Lymphedema Treatment with Crista Jain PT   [facility] ([facility])    [address]  [address]  [facility] [address]   [phone]            Aug 13, 2018 12:00 PM CDT   (Arrive by 11:45 AM)   Lymphedema Treatment with Estela Lares PT   [facility] ([facility])    [address]  [address]  [facility] [address]   [phone]            Aug 14, 2018  4:00 PM CDT   (Arrive by 3:45 PM)   RETURN ENDOCRINE with Dhara Meza MD   [facility] ([facility])    [address]  3rd Floor  [facility] [address]   [phone]            Aug 15, 2018 12:15 PM CDT   (Arrive by 12:00 PM)   Lymphedema Treatment with Crista Jain PT   [facility] ([facility])    [address]  [address]  [facility] [address]   [phone]            Aug 17, 2018 12:30 PM CDT   (Arrive by 12:15 PM)   Lymphedema Treatment with Crista Jain PT   [facility] ([facility])    [address]  [address]  [facility] [address]   [phone]              Who to contact     If you have questions or need follow up information about today's clinic visit or your schedule please contact [facility] directly at [phone].  Normal or non-critical lab and imaging results will be communicated to you by MyChart, letter or phone within 4 business days after the clinic has received the results. If you do not hear from us within 7 days, please contact the clinic through MyChart or phone. If you have a critical or abnormal lab result, we will notify you by phone as soon as possible.  Submit refill requests through [employer]  "or call your pharmacy and they will forward the refill request to us. Please allow 3 business days for your refill to be completed.          Additional Information About Your Visit        [employer] Information     [employer] gives you secure access to your electronic health record. If you see a primary care provider, you can also send messages to your care team and make appointments. If you have questions, please call your primary care clinic.  If you do not have a primary care provider, please call [phone] and they will assist you.        Care EveryWhere ID     This is your Care EveryWhere ID. This could be used by other organizations to access your [facility] medical records  [id number]        Your Vitals Were     Pulse Temperature Respirations Height Pulse Oximetry BMI (Body Mass Index)    68 98.5  F (36.9  C) (Oral) 20 4' 9\" (1.448 m) 96% 32.14 kg/m2       Blood Pressure from Last 3 Encounters:   07/17/18 125/50   07/16/18 148/64   07/11/18 118/46    Weight from Last 3 Encounters:   07/17/18 148 lb 8 oz (67.4 kg)   07/16/18 148 lb (67.1 kg)   07/11/18 150 lb (68 kg)              Today, you had the following     No orders found for display         Today's Medication Changes          These changes are accurate as of 7/17/18  4:43 PM.  If you have any questions, ask your nurse or doctor.               These medicines have changed or have updated prescriptions.        Dose/Directions    irbesartan 300 MG tablet   Commonly known as:  AVAPRO   This may have changed:    - how much to take  - how to take this  - when to take this  - additional instructions   Used for:  Essential hypertension with goal blood pressure less than 140/90        TAKE 1 TABLET EVERY DAY   Quantity:  90 tablet   Refills:  2       methimazole 5 MG tablet   Commonly known as:  TAPAZOLE   This may have changed:  when to take this   Used for:  Nontoxic multinodular goiter        Dose:  5 mg   Take 1 tablet (5 mg) by mouth daily   Quantity:  90 " tablet   Refills:  1       omeprazole 20 MG CR capsule   Commonly known as:  priLOSEC   This may have changed:  when to take this   Used for:  Gastroesophageal reflux disease without esophagitis        Dose:  20 mg   Take 1 capsule (20 mg) by mouth daily   Quantity:  180 capsule   Refills:  3       sertraline 100 MG tablet   Commonly known as:  ZOLOFT   This may have changed:  when to take this   Used for:  THERON (generalized anxiety disorder)        Dose:  100 mg   Take 1 tablet (100 mg) by mouth daily   Quantity:  90 tablet   Refills:  1                Primary Care Provider Office Phone # Fax #    Pop Antonino Zapien [phone] [address] [place], [place] [id number]        Equal Access to Services     GUNNER RODRIGUEZ : Aleja Pollack, nadia barnard, henrique chávez, maldonado bey. So [facility] [phone].    ATENCIÓN: Si habla español, tiene a sierra disposición servicios gratuitos de asistencia lingüística. Llame al [phone].    We comply with applicable federal civil rights laws and [place] laws. We do not discriminate on the basis of race, color, national origin, age, disability, sex, sexual orientation, or gender identity.            Thank you!     Thank you for choosing [facility]  for your care. Our goal is always to provide you with excellent care. Hearing back from our patients is one way we can continue to improve our services. Please take a few minutes to complete the written survey that you may receive in the mail after your visit with us. Thank you!             Your Updated Medication List - Protect others around you: Learn how to safely use, store and throw away your medicines at www.disposemymeds.org.          This list is accurate as of 7/17/18  4:43 PM.  Always use your most recent med list.                   Brand Name Dispense Instructions for use Diagnosis    acetaminophen 650 MG 8 hour tablet     100  "tablet    Take 650 mg by mouth every 8 hours as needed for mild pain or fever    Ascending cholangitis       alendronate 70 MG tablet    FOSAMAX    12 tablet    TAKE 1 TABLET EVERY 7 DAYS AT LEAST 60 MINUTES BEFORE BREAKFAST AS DIRECTED \"SEE PACKAGE FOR ADDITIONAL INSTRUCTIONS\"    High risk medication use, Osteopenia       ASPIRIN PO      Take 81 mg by mouth At Bedtime        azithromycin 250 MG tablet    ZITHROMAX Z-LUZ    6 tablet    Two tablets on the first day, then one tablet daily for the next 4 days        CALCIUM + D PO      Take by mouth 2 times daily        colestipol 1 g tablet    COLESTID     TK 1 T PO BID - TAKE OTHER MEDS 1 HOUR BEFORE OR 4 HOURS AFTER COLESID        CRANBERRY CONCENTRATE PO      Take 2 capsules by mouth At Bedtime        cycloSPORINE 0.05 % ophthalmic emulsion    RESTASIS     Place 1 drop into both eyes 2 times daily        Fish Oil 500 MG Caps      Take 1 capsule by mouth 2 times daily        furosemide 20 MG tablet    LASIX     Take 1 tablet (20 mg) by mouth every morning    Stasis edema of both lower extremities, (HFpEF) heart failure with preserved ejection fraction (H)       irbesartan 300 MG tablet    AVAPRO    90 tablet    TAKE 1 TABLET EVERY DAY    Essential hypertension with goal blood pressure less than 140/90       lovastatin 40 MG tablet    MEVACOR    90 tablet    TAKE 1 TABLET AT BEDTIME (HYPERLIPIDEMIA LDL GOAL BELOW 130)    Hyperlipidemia LDL goal <130       Melatonin 10 MG Tabs tablet      Take 10 mg by mouth nightly as needed for sleep        METAMUCIL FIBER PO      Take by mouth 3 times daily        methimazole 5 MG tablet    TAPAZOLE    90 tablet    Take 1 tablet (5 mg) by mouth daily    Nontoxic multinodular goiter       nitroGLYcerin 0.4 MG sublingual tablet    NITROSTAT    25 tablet    For chest pain place 1 tablet under the tongue every 5 minutes for 3 doses. If symptoms persist 5 minutes after 1st dose call 911.    Elevated troponin       omeprazole 20 MG CR " capsule    priLOSEC    180 capsule    Take 1 capsule (20 mg) by mouth daily    Gastroesophageal reflux disease without esophagitis       propranolol 60 MG 24 hr capsule    INDERAL LA    90 capsule    Take 1 capsule (60 mg) by mouth daily    Nontoxic multinodular goiter       rOPINIRole 0.25 MG tablet    REQUIP    90 tablet    TAKE 1 TABLET(0.25 MG) BY MOUTH EVERY NIGHT AS NEEDED    Restless legs syndrome       sertraline 100 MG tablet    ZOLOFT    90 tablet    Take 1 tablet (100 mg) by mouth daily    THERON (generalized anxiety disorder)       SYSTANE 0.4-0.3 % Soln ophthalmic solution   Generic drug:  polyethylene glycol 0.4%- propylene glycol 0.3%      Place 1 drop into both eyes 3 times daily as needed for dry eyes        traZODone 50 MG tablet    DESYREL    90 tablet    TAKE 1 TABLET(50 MG) BY MOUTH EVERY NIGHT AS NEEDED FOR SLEEP    Insomnia, unspecified type       VALIUM PO      Take 2 mg by mouth every 12 hours as needed for anxiety

## 2018-07-18 ENCOUNTER — PRE VISIT (OUTPATIENT)
Dept: UROLOGY | Facility: CLINIC | Age: 83
End: 2018-07-18

## 2018-07-18 LAB
FERRITIN SERPL-MCNC: 18 NG/ML (ref 8–252)
FOLATE SERPL-MCNC: 19.6 NG/ML
HAPTOGLOB SERPL-MCNC: 217 MG/DL (ref 35–175)

## 2018-07-18 NOTE — PROGRESS NOTES
Kong Musa,  Your result for working up the anemia suggest that you have a mild iron deficiency.    Your ferritin and iron are on the low side.  I recommend that you take a daily over-the-counter iron supplement.  For best absorption this should be taken with Vitamin C (or a small glass of orange juice).  This can cause constipation - let us know if that becomes a problem for you.    Bel Mckeon MD

## 2018-07-19 DIAGNOSIS — I50.20 HFREF (HEART FAILURE WITH REDUCED EJECTION FRACTION) (H): Primary | ICD-10-CM

## 2018-07-20 ENCOUNTER — PRE VISIT (OUTPATIENT)
Dept: CARDIOLOGY | Facility: CLINIC | Age: 83
End: 2018-07-20

## 2018-07-20 ENCOUNTER — MYC MEDICAL ADVICE (OUTPATIENT)
Dept: ENDOCRINOLOGY | Facility: CLINIC | Age: 83
End: 2018-07-20

## 2018-07-20 DIAGNOSIS — E05.90 HYPERTHYROIDISM: Primary | ICD-10-CM

## 2018-07-20 DIAGNOSIS — I50.20 HFREF (HEART FAILURE WITH REDUCED EJECTION FRACTION) (H): Primary | ICD-10-CM

## 2018-07-20 DIAGNOSIS — E04.2 NONTOXIC MULTINODULAR GOITER: ICD-10-CM

## 2018-07-20 RX ORDER — METHIMAZOLE 5 MG/1
5 TABLET ORAL DAILY
Qty: 90 TABLET | Refills: 1 | Status: SHIPPED | OUTPATIENT
Start: 2018-07-20 | End: 2018-10-25

## 2018-07-20 NOTE — TELEPHONE ENCOUNTER
"Spoke w/ Pt and with her daughter listening, Pt stated understanding of dosage change and verbalized new order correctly. Also confirmed pharmacy with new Rx. Only concern at this time \"I just want to know what is going on that makes me need to change\".   "

## 2018-07-20 NOTE — TELEPHONE ENCOUNTER
----- Message from Dhara Meza MD sent at 7/20/2018  8:56 AM CDT -----  Hello -    Based on labs done this week, her methimazole needs to be increased from 5 mg a day to 5 mg tid.  I left her a message on her phone about this and sent a MyChart note.  Could you please follow up on that to make sure she gets the message?     She is scheduled to have labs done on 7/23 and I put in new orders to be sure they get what I need to adjust the medication.    Thanks very much!    Janette

## 2018-07-22 ASSESSMENT — ENCOUNTER SYMPTOMS
EYE REDNESS: 0
DYSPNEA ON EXERTION: 1
COUGH: 1
EYE PAIN: 0
SNORES LOUDLY: 1
ARTHRALGIAS: 1
DECREASED APPETITE: 0
LOSS OF CONSCIOUSNESS: 0
POLYPHAGIA: 0
MUSCLE WEAKNESS: 1
POOR WOUND HEALING: 0
POSTURAL DYSPNEA: 0
VOMITING: 0
HEMOPTYSIS: 0
POLYDIPSIA: 0
HEADACHES: 0
NERVOUS/ANXIOUS: 1
MYALGIAS: 0
JAUNDICE: 0
DISTURBANCES IN COORDINATION: 0
NUMBNESS: 0
PALPITATIONS: 0
NAIL CHANGES: 1
RECTAL PAIN: 0
MEMORY LOSS: 0
DYSURIA: 0
NECK PAIN: 1
INSOMNIA: 1
SLEEP DISTURBANCES DUE TO BREATHING: 0
DOUBLE VISION: 0
ABDOMINAL PAIN: 0
LEG PAIN: 1
LIGHT-HEADEDNESS: 1
FLANK PAIN: 0
PARALYSIS: 0
SWOLLEN GLANDS: 0
NECK MASS: 0
HOARSE VOICE: 0
MUSCLE CRAMPS: 1
DIFFICULTY URINATING: 0
EYE IRRITATION: 0
CHILLS: 0
SORE THROAT: 0
INCREASED ENERGY: 1
NIGHT SWEATS: 0
HALLUCINATIONS: 0
TREMORS: 1
PANIC: 0
FEVER: 0
DEPRESSION: 0
COUGH DISTURBING SLEEP: 0
HOT FLASHES: 1
HYPERTENSION: 1
SPUTUM PRODUCTION: 0
SYNCOPE: 0
SHORTNESS OF BREATH: 1
DIZZINESS: 1
SINUS CONGESTION: 1
BLOOD IN STOOL: 0
BOWEL INCONTINENCE: 1
ALTERED TEMPERATURE REGULATION: 1
SKIN CHANGES: 1
DECREASED LIBIDO: 0
CONSTIPATION: 0
SINUS PAIN: 0
WEIGHT GAIN: 0
HEMATURIA: 1
WEAKNESS: 0
TINGLING: 0
ORTHOPNEA: 0
SMELL DISTURBANCE: 1
JOINT SWELLING: 0
BACK PAIN: 1
WHEEZING: 0
HEARTBURN: 0
BLOATING: 0
EXERCISE INTOLERANCE: 1
DECREASED CONCENTRATION: 0
TASTE DISTURBANCE: 1
EYE WATERING: 0
WEIGHT LOSS: 1
BRUISES/BLEEDS EASILY: 0
DIARRHEA: 1
STIFFNESS: 1
FATIGUE: 1
SPEECH CHANGE: 0
SEIZURES: 0

## 2018-07-23 ENCOUNTER — OFFICE VISIT (OUTPATIENT)
Dept: CARDIOLOGY | Facility: CLINIC | Age: 83
End: 2018-07-23
Attending: NURSE PRACTITIONER
Payer: MEDICARE

## 2018-07-23 ENCOUNTER — HOSPITAL ENCOUNTER (OUTPATIENT)
Dept: CARDIOLOGY | Facility: CLINIC | Age: 83
Discharge: HOME | End: 2018-07-23
Attending: NURSE PRACTITIONER | Admitting: NURSE PRACTITIONER
Payer: MEDICARE

## 2018-07-23 VITALS
DIASTOLIC BLOOD PRESSURE: 53 MMHG | WEIGHT: 147.1 LBS | OXYGEN SATURATION: 95 % | BODY MASS INDEX: 31.83 KG/M2 | RESPIRATION RATE: 16 BRPM | SYSTOLIC BLOOD PRESSURE: 128 MMHG | HEART RATE: 63 BPM

## 2018-07-23 DIAGNOSIS — I50.20 HFREF (HEART FAILURE WITH REDUCED EJECTION FRACTION) (H): ICD-10-CM

## 2018-07-23 DIAGNOSIS — I10 HYPERTENSION GOAL BP (BLOOD PRESSURE) < 140/90: ICD-10-CM

## 2018-07-23 DIAGNOSIS — I51.81 STRESS-INDUCED CARDIOMYOPATHY: Primary | ICD-10-CM

## 2018-07-23 DIAGNOSIS — E05.90 HYPERTHYROIDISM: ICD-10-CM

## 2018-07-23 LAB
ANION GAP SERPL CALCULATED.3IONS-SCNC: 7 MMOL/L (ref 3–14)
BUN SERPL-MCNC: 18 MG/DL (ref 7–30)
CALCIUM SERPL-MCNC: 9.5 MG/DL (ref 8.5–10.1)
CHLORIDE SERPL-SCNC: 104 MMOL/L (ref 94–109)
CO2 SERPL-SCNC: 28 MMOL/L (ref 20–32)
CREAT SERPL-MCNC: 0.71 MG/DL (ref 0.52–1.04)
GFR SERPL CREATININE-BSD FRML MDRD: 78 ML/MIN/1.7M2
GLUCOSE SERPL-MCNC: 105 MG/DL (ref 70–99)
POTASSIUM SERPL-SCNC: 5.1 MMOL/L (ref 3.4–5.3)
SODIUM SERPL-SCNC: 138 MMOL/L (ref 133–144)
T3 SERPL-MCNC: 182 NG/DL (ref 60–181)
T4 FREE SERPL-MCNC: 2.08 NG/DL (ref 0.76–1.46)
TSH SERPL DL<=0.005 MIU/L-ACNC: <0.01 MU/L (ref 0.4–4)

## 2018-07-23 PROCEDURE — 99214 OFFICE O/P EST MOD 30 MIN: CPT | Mod: ZP | Performed by: NURSE PRACTITIONER

## 2018-07-23 PROCEDURE — 93306 TTE W/DOPPLER COMPLETE: CPT | Mod: 26 | Performed by: INTERNAL MEDICINE

## 2018-07-23 PROCEDURE — G0463 HOSPITAL OUTPT CLINIC VISIT: HCPCS | Mod: 25

## 2018-07-23 PROCEDURE — 93306 TTE W/DOPPLER COMPLETE: CPT

## 2018-07-23 PROCEDURE — 80048 BASIC METABOLIC PNL TOTAL CA: CPT

## 2018-07-23 PROCEDURE — 84480 ASSAY TRIIODOTHYRONINE (T3): CPT | Performed by: INTERNAL MEDICINE

## 2018-07-23 ASSESSMENT — PAIN SCALES - GENERAL: PAINLEVEL: MILD PAIN (3)

## 2018-07-23 NOTE — PROGRESS NOTES
"HPI:   Ms. Oviedo is a 85 year old female with a past medical history including HTN, multinodular goiter, GERD, hyperthyroidism, history of atrial fibrillation with RVR, and recent stress cardiomyopathy with recovered EF. Presents to clinic for CORE follow-up. She presented to [facility] 12/13 with persistent chest pain and shortness of breath, and was found to have an elevated troponin. EKG negative for ischemic changes. Chest CT negative for PE, but showed bilateral pleural effusions. Echo showed normal LV size with mild LVH, EF 30-35%, and global akinesis with sparing of the basal segments. Coronary angiogram revealed no obstructive CAD.  Findings all suggested stress-induced cardiomyopathy, so she was discharged on BB, ARB, statin, and aspirin. She was then admitted overnight on 1/16 for palpitations and was found to be in afib with RVR in the setting of volume overload and ongoing hyperthyroidism (though free t4 was normal that admission). She converted to sinus spontaneously. She was given a one-time dose of xarelto and developed significant hematuria so this was d/c'ed.  She is now s/p bladder mass bx which were negative for malignancy. An echo during that admission showed a normalized EF. In May she had a ziopatch that showed some SVTs. Most recently she presented to ED 7/16 with shortness of breath. She was treated with Zpack and albuterol for bronchitis.     Since the ED visit she is feeling the same. Still notes shortness of breath that \"comes and goes\" - same with leg swelling. Neither has gotten worse. She did start lymphedema therapy. Also notes she was diagnosed with Graves disease. She is admittedly sedentary but denies chest pain, lightheadedness with exertion. No syncope or palpitations. Denies orthopnea, PND. Urinary bleeding noted previously has resolved.       PAST MEDICAL HISTORY:  Past Medical History:   Diagnosis Date     Calculus of kidney      Esophageal reflux      GERD (gastroesophageal reflux " "disease)      Hyperlipidemia LDL goal <130 5/9/2010     Malignant melanoma of skin of trunk, except scrotum (H)      Nonspecific abnormal finding     has living will 2004 -      Nontoxic multinodular goiter     no further eval /tx rec per pt     Osteopenia      Other psoriasis      Personal history of colonic polyps      PMR (polymyalgia rheumatica) (H)      Stress-induced cardiomyopathy      Undiagnosed cardiac murmurs      Unspecified constipation      Unspecified essential hypertension        FAMILY HISTORY:  Family History   Problem Relation Age of Onset     Cancer Father      dec - esophageal and laryngeal     HEART DISEASE Mother      Respiratory Mother      dec     Breast Cancer Daughter      Other Cancer Daughter      Thyroid Disease Daughter      Asthma Daughter      Hyperlipidemia Son      Diabetes Son        SOCIAL HISTORY:  Social History     Social History     Marital status:      Spouse name:      Number of children: 2     Years of education: N/A     Occupational History      Retired     Social History Main Topics     Smoking status: Never Smoker     Smokeless tobacco: Never Used     Alcohol use No      Comment: 6 times per year-one drink     Drug use: No     Sexual activity: Yes     Partners: Male   CURRENT MEDICATIONS:    Current Outpatient Prescriptions on File Prior to Visit:  acetaminophen 650 MG TABS Take 650 mg by mouth every 8 hours as needed for mild pain or fever   alendronate (FOSAMAX) 70 MG tablet TAKE 1 TABLET EVERY 7 DAYS AT LEAST 60 MINUTES BEFORE BREAKFAST AS DIRECTED \"SEE PACKAGE FOR ADDITIONAL INSTRUCTIONS\"   ASPIRIN PO Take 81 mg by mouth At Bedtime   Calcium Citrate-Vitamin D (CALCIUM + D PO) Take by mouth 2 times daily   colestipol (COLESTID) 1 g tablet TK 1 T PO BID - TAKE OTHER MEDS 1 HOUR BEFORE OR 4 HOURS AFTER COLESID   CRANBERRY CONCENTRATE PO Take 2 capsules by mouth At Bedtime    cycloSPORINE (RESTASIS) 0.05 % ophthalmic emulsion Place 1 drop into both eyes 2 " times daily   DiazePAM (VALIUM PO) Take 2 mg by mouth every 12 hours as needed for anxiety   furosemide (LASIX) 20 MG tablet Take 1 tablet (20 mg) by mouth every morning   irbesartan (AVAPRO) 300 MG tablet TAKE 1 TABLET EVERY DAY (Patient taking differently: Take 300 mg by mouth every morning TAKE 1 TABLET EVERY DAY)   lovastatin (MEVACOR) 40 MG tablet TAKE 1 TABLET AT BEDTIME (HYPERLIPIDEMIA LDL GOAL BELOW 130)   Melatonin 10 MG TABS tablet Take 10 mg by mouth nightly as needed for sleep   methimazole (TAPAZOLE) 5 MG tablet Take 1 tablet (5 mg) by mouth daily   nitroGLYcerin (NITROSTAT) 0.4 MG sublingual tablet For chest pain place 1 tablet under the tongue every 5 minutes for 3 doses. If symptoms persist 5 minutes after 1st dose call 911.   Omega-3 Fatty Acids (FISH OIL) 500 MG CAPS Take 1 capsule by mouth 2 times daily    omeprazole (PRILOSEC) 20 MG CR capsule Take 1 capsule (20 mg) by mouth daily (Patient taking differently: Take 20 mg by mouth five times a week )   polyethylene glycol 0.4%- propylene glycol 0.3% (SYSTANE) 0.4-0.3 % SOLN ophthalmic solution Place 1 drop into both eyes 3 times daily as needed for dry eyes   propranolol (INDERAL LA) 60 MG 24 hr capsule Take 1 capsule (60 mg) by mouth daily   Psyllium (METAMUCIL FIBER PO) Take by mouth 3 times daily   rOPINIRole (REQUIP) 0.25 MG tablet TAKE 1 TABLET(0.25 MG) BY MOUTH EVERY NIGHT AS NEEDED   sertraline (ZOLOFT) 100 MG tablet Take 1 tablet (100 mg) by mouth daily (Patient taking differently: Take 100 mg by mouth every morning )   traZODone (DESYREL) 50 MG tablet TAKE 1 TABLET(50 MG) BY MOUTH EVERY NIGHT AS NEEDED FOR SLEEP     No current facility-administered medications on file prior to visit.     ROS:   CONSTITUTIONAL: Denies fever, chills, or weight fluctuations.   HEENT: Denies headache, vision changes, and changes in speech.   CV: Refer to HPI.   PULMONARY:Refer to HPI.   GI:Denies nausea, vomiting, diarrhea, and abdominal pain. Bowel movements  are regular.   :Denies urinary alterations, dysuria, urinary frequency, hematuria, and abnormal drainage.   EXT:+lower extremity edema.   SKIN:Denies abnormal rashes or lesions.   MUSCULOSKELETAL:Denies upper or lower extremity weakness and pain.   NEUROLOGIC:Denies lightheadedness, dizziness, seizures, or upper or lower extremity paresthesia.     EXAM:  /53 (BP Location: Right arm, Patient Position: Chair, Cuff Size: Adult Regular)  Pulse 63  Resp 16  Wt 66.7 kg (147 lb 1.6 oz)  SpO2 95%  BMI 31.83 kg/m2     GENERAL: Appears comfortable, in no acute distress.   HEENT: Eye symmetrical, no discharge or icterus bilaterally. Mucous membranes moist and without lesions.  CV: Regular, +S1S2, no appreciable murmur, rub, or gallop. JVP not visible at 75 degrees.   RESPIRATORY: Respirations regular, even, and unlabored. Lungs CTA throughout.   GI: Soft and non distended with normoactive bowel sounds present in all quadrants. No tenderness, rebound, guarding. No hepatomegaly.   EXTREMITIES: Non pitting mild ankle edema. 2+ bilateral pedal pulses.   NEUROLOGIC: Alert and oriented x 3. No focal deficits.   MUSCULOSKELETAL: No joint swelling or tenderness.   SKIN: No jaundice. No rashes or lesions.     Labs, reviewed with patient in clinic today:  CBC RESULTS:  Lab Results   Component Value Date    WBC 5.9 07/16/2018    RBC 3.68 (L) 07/16/2018    HGB 10.4 (L) 07/16/2018    HCT 32.7 (L) 07/16/2018    MCV 89 07/16/2018    MCH 28.3 07/16/2018    MCHC 31.8 07/16/2018    RDW 13.0 07/16/2018     07/16/2018       CMP RESULTS:  Lab Results   Component Value Date     07/17/2018    POTASSIUM 4.1 07/17/2018    CHLORIDE 103 07/17/2018    CO2 28 07/17/2018    ANIONGAP 6 07/17/2018     (H) 07/17/2018    BUN 19 07/17/2018    CR 0.68 07/17/2018    GFRESTIMATED 82 07/17/2018    GFRESTBLACK >90 07/17/2018    SHREYA 8.8 07/17/2018    BILITOTAL 0.5 07/16/2018    ALBUMIN 3.1 (L) 07/16/2018    ALKPHOS 84 07/16/2018     ALT 32 07/16/2018    AST 24 07/16/2018        INR RESULTS:  Lab Results   Component Value Date    INR 1.71 (H) 01/16/2018       Lab Results   Component Value Date    MAG 2.1 01/16/2018     Lab Results   Component Value Date    NTBNPI 1670 07/16/2018     Lab Results   Component Value Date    NTBNP 976 (H) 07/05/2018       Diagnostics:  TTE 3/14/18  Global and regional left ventricular function is normal with an EF of 60-65%.  No regional wall motion abnormalities are seen.  Right ventricular function, chamber size, wall motion, and thickness are  normal.  No pericardial effusion is present.    TTE 1/16/18  Left ventricular function is normal.The EF is > 65%.  The inferior vena cava was normal in size with preserved respiratory  variability.  No pericardial effusion is present.  The left ventricular function has improved.    Coronary angiogram 12/13/17      TTE 12/13/17  ormal left ventricular size with mild concentric hypertrophy.  Global akinesis with sparing of the basal segments consistent with stress  cardiomyopathy versus multivessel coronary artery disease. Moderately reduced  systolic function. EF 30-35%.  Normal right ventricular size and function.  Severe left atrial enlargement.  No pericardial effusion.     Compared to the prior study 10/31/15 the wall motion abnormalities and LV  dysfunction are new.    Assessment and Plan:   Ms. Oviedo is a 85 year old female with history of stress induced cardiomyopathy and systolic heart failure now with recovered/normalized LV function and also history of hyperthyroidism on methimazole. She is euvolemic and clinically stable from a cardiac standpoint. Recent admission for shortness of breath not likely to be cardiac and her LE edema is not likely cardiac given normal echo findings with normal IVC.     # History of systolic heart failure/HFrEF with recovered EF (65%) secondary to stress induced cardiomyopathy    Stage C. NYHA Class II.    Fluid status: euvolemic  centrally, dependent lymphedema, on lasix per PCP but I don't feel she needs if legs can we wrapped  ACEi/ARB: Irbesartan 300 mg daily  BB: propanolol which she is taking for hyperthyroidism  Aldosterone antagonist: spironolactone d/c'ed secondary to hyponatremia, not indicated as EF normal  SCD prophylaxis: does not meet criteria for implant    # History of atrial fibrillation, RVR at time of diagnosis  Very symptomatic at the time, should do holter monitor for any recurrence of palpitations as may need additional rate control (on propranolol now). Spontaneously converted to sinus in ED. Last ECG SB with PACs. zio patch in May with a few SVTs and SVEs. [id number] score 4 - reviewed annual stroke risk and HASBLED risk with patient and her daughter. Elects for no warfarin/DOAC at this time. One time dose of Xarelto produced significant hematuria. She is currently taking ASA 81 mg.     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 Follows with endocrinology.  # HTN: At goal, on irbesartan and propanolol.       Follow up with Dr Griffith in 3 months for afib and CORE only PRN           25 minutes spent face-to-face with patient, >50% in counseling and/or coordination of care as described above    Elizabeth Richards, DESMOND, NP-C  7/23/2018

## 2018-07-23 NOTE — PATIENT INSTRUCTIONS
"You were seen today in the Cardiovascular Clinic at the [facility].     Cardiology Providers you saw during your visit: Elizabeth DAMON CNP       1. No changes to medicines today - your echo looks good  2. We will have you see Dr Griffith in September as scheduled - otherwise only with CORE clinic \"as needed\"    Results for QUIN VALDEZ (MRN [id number]) as of 2018 11:31   Ref. Range 2018 10:37   Sodium Latest Ref Range: 133 - 144 mmol/L 138   Potassium Latest Ref Range: 3.4 - 5.3 mmol/L 5.1   Chloride Latest Ref Range: 94 - 109 mmol/L 104   Carbon Dioxide Latest Ref Range: 20 - 32 mmol/L 28   Urea Nitrogen Latest Ref Range: 7 - 30 mg/dL 18   Creatinine Latest Ref Range: 0.52 - 1.04 mg/dL 0.71   GFR Estimate Latest Ref Range: >60 mL/min/1.7m2 78   GFR Estimate If Black Latest Ref Range: >60 mL/min/1.7m2 >90   Calcium Latest Ref Range: 8.5 - 10.1 mg/dL 9.5   Anion Gap Latest Ref Range: 3 - 14 mmol/L 7   T4 Free Latest Ref Range: 0.76 - 1.46 ng/dL 2.08 (H)   TSH Latest Ref Range: 0.40 - 4.00 mU/L <0.01 (L)   Glucose Latest Ref Range: 70 - 99 mg/dL 105 (H)           Please limit your fluid intake to 2 L (64 ounces) daily.  2 Liters a day = 8.5 cups, or 72 ounces.  Please limit your salt intake to 2 grams a day or less.    If you gain 2# in 24 hours or 5# in one week call Kayli Olsen RN so we can adjust your medications as needed over the phone.    Please feel free to call me with any questions or concerns.      Kayli Olsen RN BSN   [facility]  Cardiology Care Coordinator-Heart Failure Clinic    Questions and [phone].   First press #1 for the University and then press #3 for \"Medical Questions\" to reach us Cardiology Nurses.     On Call Cardiologist for after hours or on weekends: [phone]   option #4 and ask to speak to the on-call Cardiologist. Inform them you are a CORE/heart failure patient at the [place] [facility].        If you need a medication " refill please contact your pharmacy.  Please allow 3 business days for your refill to be completed.  _______________________________________________________  C.O.R.E. CLINIC Cardiomyopathy, Optimization, Rehabilitation, Education   The C.O.R.E. CLINIC is a heart failure specialty clinic within the [facility] where you will work with specialized nurse practitioners dedicated to helping patients with heart failure carefully adjust medications, receive education, and learn who and when to call if symptoms develop. They specialize in helping you better understand your condition, slow the progression of your disease, improve the length and quality of your life, help you detect future heart problems before they become life threatening, and avoid hospitalizations.  As always, thank you for trusting us with your health care needs!

## 2018-07-23 NOTE — MR AVS SNAPSHOT
"              After Visit Summary   2018    Dimple Oviedo    MRN: [id number]           Patient Information     Date Of Birth          1933        Visit Information        Provider Department      2018 11:30 AM Crista Richards APRN CNP [facility]        Care Instructions    You were seen today in the Cardiovascular Clinic at the [facility].     Cardiology Providers you saw during your visit: Elizabeth DAMON CNP       1. No changes to medicines today - your echo looks good  2. We will have you see Dr Griffith in September as scheduled - otherwise only with CORE clinic \"as needed\"    Results for DIMPLE OVIEDO (MRN [id number]) as of 2018 11:31   Ref. Range 2018 10:37   Sodium Latest Ref Range: 133 - 144 mmol/L 138   Potassium Latest Ref Range: 3.4 - 5.3 mmol/L 5.1   Chloride Latest Ref Range: 94 - 109 mmol/L 104   Carbon Dioxide Latest Ref Range: 20 - 32 mmol/L 28   Urea Nitrogen Latest Ref Range: 7 - 30 mg/dL 18   Creatinine Latest Ref Range: 0.52 - 1.04 mg/dL 0.71   GFR Estimate Latest Ref Range: >60 mL/min/1.7m2 78   GFR Estimate If Black Latest Ref Range: >60 mL/min/1.7m2 >90   Calcium Latest Ref Range: 8.5 - 10.1 mg/dL 9.5   Anion Gap Latest Ref Range: 3 - 14 mmol/L 7   T4 Free Latest Ref Range: 0.76 - 1.46 ng/dL 2.08 (H)   TSH Latest Ref Range: 0.40 - 4.00 mU/L <0.01 (L)   Glucose Latest Ref Range: 70 - 99 mg/dL 105 (H)           Please limit your fluid intake to 2 L (64 ounces) daily.  2 Liters a day = 8.5 cups, or 72 ounces.  Please limit your salt intake to 2 grams a day or less.    If you gain 2# in 24 hours or 5# in one week call Kayli Olsen RN so we can adjust your medications as needed over the phone.    Please feel free to call me with any questions or concerns.      Kayli Olsen RN BSN   [facility]  Cardiology Care Coordinator-Heart Failure Clinic    Questions and [phone].   First press #1 for the [employer] and " "then press #3 for \"Medical Questions\" to reach us Cardiology Nurses.     On Call Cardiologist for after hours or on weekends: [phone]   option #4 and ask to speak to the on-call Cardiologist. Inform them you are a CORE/heart failure patient at the [place].        If you need a medication refill please contact your pharmacy.  Please allow 3 business days for your refill to be completed.  _______________________________________________________  C.O.R.E. CLINIC Cardiomyopathy, Optimization, Rehabilitation, Education   The C.O.R.E. CLINIC is a heart failure specialty clinic within the [facility] where you will work with specialized nurse practitioners dedicated to helping patients with heart failure carefully adjust medications, receive education, and learn who and when to call if symptoms develop. They specialize in helping you better understand your condition, slow the progression of your disease, improve the length and quality of your life, help you detect future heart problems before they become life threatening, and avoid hospitalizations.  As always, thank you for trusting us with your health care needs!                Follow-ups after your visit        Your next 10 appointments already scheduled     Jul 26, 2018  2:20 PM CDT   (Arrive by 2:05 PM)   Cystoscopy with Stuart King MD   [facility] ([facility] [place])    [address]  4th Floor  [facility] [id number] [phone]            Aug 06, 2018 12:00 PM CDT   (Arrive by 11:45 AM)   Lymphedema Treatment with Estela Lares PT   [facility] ([facility] [place])    [address]  [address]  [facility] [id number] [phone]            Aug 08, 2018 10:00 AM CDT   (Arrive by 9:45 AM)   Lymphedema Treatment with Crista Jain PT   [facility] (" [facility] [place])    [address]  [address]  [facility] [address]   [phone]            Aug 10, 2018 12:30 PM CDT   (Arrive by 12:15 PM)   Lymphedema Treatment with Crista Jain PT   [facility] ([facility] [place])    [address]  [address]  [facility] [address]   [phone]            Aug 13, 2018 12:00 PM CDT   (Arrive by 11:45 AM)   Lymphedema Treatment with Estela Lares PT   [facility] ([facility] [place])    [address]  [address]  [facility] [address]   [phone]            Aug 14, 2018  4:00 PM CDT   (Arrive by 3:45 PM)   RETURN ENDOCRINE with Dhara Meza MD   [facility] ([facility])    [address]  3rd Floor  [facility] [address]   [phone]            Aug 15, 2018 12:15 PM CDT   (Arrive by 12:00 PM)   Lymphedema Treatment with Crista Jain PT   [facility] ([facility] [place])    [address]  [address]  [facility] [address]   [phone]            Aug 17, 2018 12:30 PM CDT   (Arrive by 12:15 PM)   Lymphedema Treatment with Crista Jain PT   [facility] ([facility] [place])    [address]  [address]  [facility] [address]   [phone]            Aug 20, 2018 12:00 PM CDT   (Arrive by 11:45 AM)   Lymphedema Treatment with Estela Lares PT   [facility] ([facility] [place])    [address]  [address]  [facility] [address]   [phone]            Aug 22, 2018 12:30 PM CDT   (Arrive by 12:15 PM)   Lymphedema Treatment with Crista Jain PT   [facility] ([facility] [place])    [address]  [address]  [facility] [address]   [phone]              Who to contact     If you  have questions or need follow up information about today's clinic visit or your schedule please contact [facility] directly at [phone].  Normal or non-critical lab and imaging results will be communicated to you by [employer], letter or phone within 4 business days after the clinic has received the results. If you do not hear from us within 7 days, please contact the clinic through [employer] or phone. If you have a critical or abnormal lab result, we will notify you by phone as soon as possible.  Submit refill requests through [employer] or call your pharmacy and they will forward the refill request to us. Please allow 3 business days for your refill to be completed.          Additional Information About Your Visit        [employer] Information     [employer] gives you secure access to your electronic health record. If you see a primary care provider, you can also send messages to your care team and make appointments. If you have questions, please call your primary care clinic.  If you do not have a primary care provider, please call [phone] and they will assist you.        Care EveryWhere ID     This is your Care EveryWhere ID. This could be used by other organizations to access your [facility] medical records  [id number]        Your Vitals Were     Pulse Respirations Pulse Oximetry BMI (Body Mass Index)          63 16 95% 31.83 kg/m2         Blood Pressure from Last 3 Encounters:   07/23/18 128/53   07/17/18 125/50   07/16/18 148/64    Weight from Last 3 Encounters:   07/23/18 66.7 kg (147 lb 1.6 oz)   07/17/18 67.4 kg (148 lb 8 oz)   07/16/18 67.1 kg (148 lb)              Today, you had the following     No orders found for display         Today's Medication Changes          These changes are accurate as of 7/23/18 11:50 AM.  If you have any questions, ask your nurse or doctor.               These medicines have changed or have updated prescriptions.        Dose/Directions    irbesartan 300 MG tablet    Commonly known as:  AVAPRO   This may have changed:    - how much to take  - how to take this  - when to take this  - additional instructions   Used for:  Essential hypertension with goal blood pressure less than 140/90        TAKE 1 TABLET EVERY DAY   Quantity:  90 tablet   Refills:  2       methimazole 5 MG tablet   Commonly known as:  TAPAZOLE   This may have changed:  when to take this   Used for:  Nontoxic multinodular goiter        Dose:  5 mg   Take 1 tablet (5 mg) by mouth daily   Quantity:  90 tablet   Refills:  1       omeprazole 20 MG CR capsule   Commonly known as:  priLOSEC   This may have changed:  when to take this   Used for:  Gastroesophageal reflux disease without esophagitis        Dose:  20 mg   Take 1 capsule (20 mg) by mouth daily   Quantity:  180 capsule   Refills:  3       sertraline 100 MG tablet   Commonly known as:  ZOLOFT   This may have changed:  when to take this   Used for:  THERON (generalized anxiety disorder)        Dose:  100 mg   Take 1 tablet (100 mg) by mouth daily   Quantity:  90 tablet   Refills:  1                Primary Care Provider Office Phone # Fax #    Pop Antonino Zapien [phone]       [facility] [place], [place] [id number]        Equal Access to Services     [facility]: Hadkiana Pollack, waberylda evon, qaloki chávez, maldonado seay . So [facility] [phone].    ATENCIÓN: Si habla español, tiene a sierra disposición servicios gratuitos de asistencia lingüística. [facility] [phone].    We comply with applicable federal civil rights laws and [place] laws. We do not discriminate on the basis of race, color, national origin, age, disability, sex, sexual orientation, or gender identity.            Thank you!     Thank you for choosing [facility]  for your care. Our goal is always to provide you with excellent care. Hearing back from our patients is one way we can continue to improve our  "services. Please take a few minutes to complete the written survey that you may receive in the mail after your visit with us. Thank you!             Your Updated Medication List - Protect others around you: Learn how to safely use, store and throw away your medicines at www.disposemymeds.org.          This list is accurate as of 7/23/18 11:50 AM.  Always use your most recent med list.                   Brand Name Dispense Instructions for use Diagnosis    acetaminophen 650 MG 8 hour tablet     100 tablet    Take 650 mg by mouth every 8 hours as needed for mild pain or fever    Ascending cholangitis       alendronate 70 MG tablet    FOSAMAX    12 tablet    TAKE 1 TABLET EVERY 7 DAYS AT LEAST 60 MINUTES BEFORE BREAKFAST AS DIRECTED \"SEE PACKAGE FOR ADDITIONAL INSTRUCTIONS\"    High risk medication use, Osteopenia       ASPIRIN PO      Take 81 mg by mouth At Bedtime        CALCIUM + D PO      Take by mouth 2 times daily        colestipol 1 g tablet    COLESTID     TK 1 T PO BID - TAKE OTHER MEDS 1 HOUR BEFORE OR 4 HOURS AFTER COLESID        CRANBERRY CONCENTRATE PO      Take 2 capsules by mouth At Bedtime        cycloSPORINE 0.05 % ophthalmic emulsion    RESTASIS     Place 1 drop into both eyes 2 times daily        Fish Oil 500 MG Caps      Take 1 capsule by mouth 2 times daily        furosemide 20 MG tablet    LASIX     Take 1 tablet (20 mg) by mouth every morning    Stasis edema of both lower extremities, (HFpEF) heart failure with preserved ejection fraction (H)       irbesartan 300 MG tablet    AVAPRO    90 tablet    TAKE 1 TABLET EVERY DAY    Essential hypertension with goal blood pressure less than 140/90       IRON SUPPLEMENT PO           lovastatin 40 MG tablet    MEVACOR    90 tablet    TAKE 1 TABLET AT BEDTIME (HYPERLIPIDEMIA LDL GOAL BELOW 130)    Hyperlipidemia LDL goal <130       Melatonin 10 MG Tabs tablet      Take 10 mg by mouth nightly as needed for sleep        METAMUCIL FIBER PO      Take by mouth 3 " times daily        methimazole 5 MG tablet    TAPAZOLE    90 tablet    Take 1 tablet (5 mg) by mouth daily    Nontoxic multinodular goiter       nitroGLYcerin 0.4 MG sublingual tablet    NITROSTAT    25 tablet    For chest pain place 1 tablet under the tongue every 5 minutes for 3 doses. If symptoms persist 5 minutes after 1st dose call 911.    Elevated troponin       omeprazole 20 MG CR capsule    priLOSEC    180 capsule    Take 1 capsule (20 mg) by mouth daily    Gastroesophageal reflux disease without esophagitis       PROBIOTIC ADVANCED PO           propranolol 60 MG 24 hr capsule    INDERAL LA    90 capsule    Take 1 capsule (60 mg) by mouth daily    Nontoxic multinodular goiter       rOPINIRole 0.25 MG tablet    REQUIP    90 tablet    TAKE 1 TABLET(0.25 MG) BY MOUTH EVERY NIGHT AS NEEDED    Restless legs syndrome       sertraline 100 MG tablet    ZOLOFT    90 tablet    Take 1 tablet (100 mg) by mouth daily    THERON (generalized anxiety disorder)       SYSTANE 0.4-0.3 % Soln ophthalmic solution   Generic drug:  polyethylene glycol 0.4%- propylene glycol 0.3%      Place 1 drop into both eyes 3 times daily as needed for dry eyes        traZODone 50 MG tablet    DESYREL    90 tablet    TAKE 1 TABLET(50 MG) BY MOUTH EVERY NIGHT AS NEEDED FOR SLEEP    Insomnia, unspecified type       VALIUM PO      Take 2 mg by mouth every 12 hours as needed for anxiety

## 2018-07-23 NOTE — LETTER
"7/23/2018      RE: Dimple Oviedo  [address]  [facility] [address]       Dear Colleague,    Thank you for the opportunity to participate in the care of your patient, Dimple Oviedo, at the [facility] at [facility]. Please see a copy of my visit note below.    HPI:   Ms. Oviedo is a 85 year old female with a past medical history including HTN, multinodular goiter, GERD, hyperthyroidism, history of atrial fibrillation with RVR, and recent stress cardiomyopathy with recovered EF. Presents to clinic for CORE follow-up. She presented to [facility] 12/13 with persistent chest pain and shortness of breath, and was found to have an elevated troponin. EKG negative for ischemic changes. Chest CT negative for PE, but showed bilateral pleural effusions. Echo showed normal LV size with mild LVH, EF 30-35%, and global akinesis with sparing of the basal segments. Coronary angiogram revealed no obstructive CAD.  Findings all suggested stress-induced cardiomyopathy, so she was discharged on BB, ARB, statin, and aspirin. She was then admitted overnight on 1/16 for palpitations and was found to be in afib with RVR in the setting of volume overload and ongoing hyperthyroidism (though free t4 was normal that admission). She converted to sinus spontaneously. She was given a one-time dose of xarelto and developed significant hematuria so this was d/c'ed.  She is now s/p bladder mass bx which were negative for malignancy. An echo during that admission showed a normalized EF. In May she had a ziopatch that showed some SVTs. Most recently she presented to ED 7/16 with shortness of breath. She was treated with Zpack and albuterol for bronchitis.     Since the ED visit she is feeling the same. Still notes shortness of breath that \"comes and goes\" - same with leg swelling. Neither has gotten worse. She did start lymphedema therapy. Also notes she was diagnosed with Graves disease. She " "is admittedly sedentary but denies chest pain, lightheadedness with exertion. No syncope or palpitations. Denies orthopnea, PND. Urinary bleeding noted previously has resolved.       PAST MEDICAL HISTORY:  Past Medical History:   Diagnosis Date     Calculus of kidney      Esophageal reflux      GERD (gastroesophageal reflux disease)      Hyperlipidemia LDL goal <130 5/9/2010     Malignant melanoma of skin of trunk, except scrotum (H)      Nonspecific abnormal finding     has living will 2004 -      Nontoxic multinodular goiter     no further eval /tx rec per pt     Osteopenia      Other psoriasis      Personal history of colonic polyps      PMR (polymyalgia rheumatica) (H)      Stress-induced cardiomyopathy      Undiagnosed cardiac murmurs      Unspecified constipation      Unspecified essential hypertension        FAMILY HISTORY:  Family History   Problem Relation Age of Onset     Cancer Father      dec - esophageal and laryngeal     HEART DISEASE Mother      Respiratory Mother      dec     Breast Cancer Daughter      Other Cancer Daughter      Thyroid Disease Daughter      Asthma Daughter      Hyperlipidemia Son      Diabetes Son        SOCIAL HISTORY:  Social History     Social History     Marital status:      Spouse name:      Number of children: 2     Years of education: N/A     Occupational History      Retired     Social History Main Topics     Smoking status: Never Smoker     Smokeless tobacco: Never Used     Alcohol use No      Comment: 6 times per year-one drink     Drug use: No     Sexual activity: Yes     Partners: Male   CURRENT MEDICATIONS:    Current Outpatient Prescriptions on File Prior to Visit:  acetaminophen 650 MG TABS Take 650 mg by mouth every 8 hours as needed for mild pain or fever   alendronate (FOSAMAX) 70 MG tablet TAKE 1 TABLET EVERY 7 DAYS AT LEAST 60 MINUTES BEFORE BREAKFAST AS DIRECTED \"SEE PACKAGE FOR ADDITIONAL INSTRUCTIONS\"   ASPIRIN PO Take 81 mg by mouth At Bedtime "   Calcium Citrate-Vitamin D (CALCIUM + D PO) Take by mouth 2 times daily   colestipol (COLESTID) 1 g tablet TK 1 T PO BID - TAKE OTHER MEDS 1 HOUR BEFORE OR 4 HOURS AFTER COLESID   CRANBERRY CONCENTRATE PO Take 2 capsules by mouth At Bedtime    cycloSPORINE (RESTASIS) 0.05 % ophthalmic emulsion Place 1 drop into both eyes 2 times daily   DiazePAM (VALIUM PO) Take 2 mg by mouth every 12 hours as needed for anxiety   furosemide (LASIX) 20 MG tablet Take 1 tablet (20 mg) by mouth every morning   irbesartan (AVAPRO) 300 MG tablet TAKE 1 TABLET EVERY DAY (Patient taking differently: Take 300 mg by mouth every morning TAKE 1 TABLET EVERY DAY)   lovastatin (MEVACOR) 40 MG tablet TAKE 1 TABLET AT BEDTIME (HYPERLIPIDEMIA LDL GOAL BELOW 130)   Melatonin 10 MG TABS tablet Take 10 mg by mouth nightly as needed for sleep   methimazole (TAPAZOLE) 5 MG tablet Take 1 tablet (5 mg) by mouth daily   nitroGLYcerin (NITROSTAT) 0.4 MG sublingual tablet For chest pain place 1 tablet under the tongue every 5 minutes for 3 doses. If symptoms persist 5 minutes after 1st dose call 911.   Omega-3 Fatty Acids (FISH OIL) 500 MG CAPS Take 1 capsule by mouth 2 times daily    omeprazole (PRILOSEC) 20 MG CR capsule Take 1 capsule (20 mg) by mouth daily (Patient taking differently: Take 20 mg by mouth five times a week )   polyethylene glycol 0.4%- propylene glycol 0.3% (SYSTANE) 0.4-0.3 % SOLN ophthalmic solution Place 1 drop into both eyes 3 times daily as needed for dry eyes   propranolol (INDERAL LA) 60 MG 24 hr capsule Take 1 capsule (60 mg) by mouth daily   Psyllium (METAMUCIL FIBER PO) Take by mouth 3 times daily   rOPINIRole (REQUIP) 0.25 MG tablet TAKE 1 TABLET(0.25 MG) BY MOUTH EVERY NIGHT AS NEEDED   sertraline (ZOLOFT) 100 MG tablet Take 1 tablet (100 mg) by mouth daily (Patient taking differently: Take 100 mg by mouth every morning )   traZODone (DESYREL) 50 MG tablet TAKE 1 TABLET(50 MG) BY MOUTH EVERY NIGHT AS NEEDED FOR SLEEP     No  current facility-administered medications on file prior to visit.     ROS:   CONSTITUTIONAL: Denies fever, chills, or weight fluctuations.   HEENT: Denies headache, vision changes, and changes in speech.   CV: Refer to HPI.   PULMONARY:Refer to HPI.   GI:Denies nausea, vomiting, diarrhea, and abdominal pain. Bowel movements are regular.   :Denies urinary alterations, dysuria, urinary frequency, hematuria, and abnormal drainage.   EXT:+lower extremity edema.   SKIN:Denies abnormal rashes or lesions.   MUSCULOSKELETAL:Denies upper or lower extremity weakness and pain.   NEUROLOGIC:Denies lightheadedness, dizziness, seizures, or upper or lower extremity paresthesia.     EXAM:  /53 (BP Location: Right arm, Patient Position: Chair, Cuff Size: Adult Regular)  Pulse 63  Resp 16  Wt 66.7 kg (147 lb 1.6 oz)  SpO2 95%  BMI 31.83 kg/m2     GENERAL: Appears comfortable, in no acute distress.   HEENT: Eye symmetrical, no discharge or icterus bilaterally. Mucous membranes moist and without lesions.  CV: Regular, +S1S2, no appreciable murmur, rub, or gallop. JVP not visible at 75 degrees.   RESPIRATORY: Respirations regular, even, and unlabored. Lungs CTA throughout.   GI: Soft and non distended with normoactive bowel sounds present in all quadrants. No tenderness, rebound, guarding. No hepatomegaly.   EXTREMITIES: Non pitting mild ankle edema. 2+ bilateral pedal pulses.   NEUROLOGIC: Alert and oriented x 3. No focal deficits.   MUSCULOSKELETAL: No joint swelling or tenderness.   SKIN: No jaundice. No rashes or lesions.     Labs, reviewed with patient in clinic today:  CBC RESULTS:  Lab Results   Component Value Date    WBC 5.9 07/16/2018    RBC 3.68 (L) 07/16/2018    HGB 10.4 (L) 07/16/2018    HCT 32.7 (L) 07/16/2018    MCV 89 07/16/2018    MCH 28.3 07/16/2018    MCHC 31.8 07/16/2018    RDW 13.0 07/16/2018     07/16/2018       CMP RESULTS:  Lab Results   Component Value Date     07/17/2018    POTASSIUM  4.1 07/17/2018    CHLORIDE 103 07/17/2018    CO2 28 07/17/2018    ANIONGAP 6 07/17/2018     (H) 07/17/2018    BUN 19 07/17/2018    CR 0.68 07/17/2018    GFRESTIMATED 82 07/17/2018    GFRESTBLACK >90 07/17/2018    SHREYA 8.8 07/17/2018    BILITOTAL 0.5 07/16/2018    ALBUMIN 3.1 (L) 07/16/2018    ALKPHOS 84 07/16/2018    ALT 32 07/16/2018    AST 24 07/16/2018        INR RESULTS:  Lab Results   Component Value Date    INR 1.71 (H) 01/16/2018       Lab Results   Component Value Date    MAG 2.1 01/16/2018     Lab Results   Component Value Date    NTBNPI 1670 07/16/2018     Lab Results   Component Value Date    NTBNP 976 (H) 07/05/2018       Diagnostics:  TTE 3/14/18  Global and regional left ventricular function is normal with an EF of 60-65%.  No regional wall motion abnormalities are seen.  Right ventricular function, chamber size, wall motion, and thickness are  normal.  No pericardial effusion is present.    TTE 1/16/18  Left ventricular function is normal.The EF is > 65%.  The inferior vena cava was normal in size with preserved respiratory  variability.  No pericardial effusion is present.  The left ventricular function has improved.    Coronary angiogram 12/13/17      TTE 12/13/17  ormal left ventricular size with mild concentric hypertrophy.  Global akinesis with sparing of the basal segments consistent with stress  cardiomyopathy versus multivessel coronary artery disease. Moderately reduced  systolic function. EF 30-35%.  Normal right ventricular size and function.  Severe left atrial enlargement.  No pericardial effusion.     Compared to the prior study 10/31/15 the wall motion abnormalities and LV  dysfunction are new.    Assessment and Plan:   Ms. Oviedo is a 85 year old female with history of stress induced cardiomyopathy and systolic heart failure now with recovered/normalized LV function and also history of hyperthyroidism on methimazole. She is euvolemic and clinically stable from a cardiac  standpoint. Recent admission for shortness of breath not likely to be cardiac and her LE edema is not likely cardiac given normal echo findings with normal IVC.     # History of systolic heart failure/HFrEF with recovered EF (65%) secondary to stress induced cardiomyopathy    Stage C. NYHA Class II.    Fluid status: euvolemic  centrally, dependent lymphedema, on lasix per PCP but I don't feel she needs if legs can we wrapped  ACEi/ARB: Irbesartan 300 mg daily  BB: propanolol which she is taking for hyperthyroidism  Aldosterone antagonist: spironolactone d/c'ed secondary to hyponatremia, not indicated as EF normal  SCD prophylaxis: does not meet criteria for implant    # History of atrial fibrillation, RVR at time of diagnosis  Very symptomatic at the time, should do holter monitor for any recurrence of palpitations as may need additional rate control (on propranolol now). Spontaneously converted to sinus in ED. Last ECG SB with PACs. zio patch in May with a few SVTs and SVEs. [id number] score 4 - reviewed annual stroke risk and HASBLED risk with patient and her daughter. Elects for no warfarin/DOAC at this time. One time dose of Xarelto produced significant hematuria. She is currently taking ASA 81 mg.     # Hyperthyroidism  2/2 ?Graves: Treated with methimazole and propanolol. Follows with endocrinology.  # HTN: At goal, on irbesartan and propanolol.       Follow up with Dr Griffith in 3 months for afib and CORE only PRN       25 minutes spent face-to-face with patient, >50% in counseling and/or coordination of care as described above    Elizabeth Richards, DESMOND, NP-C  7/23/2018

## 2018-07-24 DIAGNOSIS — E05.90 HYPERTHYROIDISM: Primary | ICD-10-CM

## 2018-07-26 ENCOUNTER — OFFICE VISIT (OUTPATIENT)
Dept: UROLOGY | Facility: CLINIC | Age: 83
End: 2018-07-26
Payer: MEDICARE

## 2018-07-26 ENCOUNTER — DOCUMENTATION ONLY (OUTPATIENT)
Dept: FAMILY MEDICINE | Facility: CLINIC | Age: 83
End: 2018-07-26

## 2018-07-26 VITALS
WEIGHT: 147.1 LBS | HEIGHT: 57 IN | DIASTOLIC BLOOD PRESSURE: 64 MMHG | SYSTOLIC BLOOD PRESSURE: 136 MMHG | HEART RATE: 65 BPM | BODY MASS INDEX: 31.73 KG/M2

## 2018-07-26 DIAGNOSIS — R31.0 GROSS HEMATURIA: Primary | ICD-10-CM

## 2018-07-26 PROCEDURE — 88112 CYTOPATH CELL ENHANCE TECH: CPT | Performed by: UROLOGY

## 2018-07-26 PROCEDURE — 88120 CYTP URNE 3-5 PROBES EA SPEC: CPT | Performed by: UROLOGY

## 2018-07-26 ASSESSMENT — PAIN SCALES - GENERAL: PAINLEVEL: NO PAIN (0)

## 2018-07-26 NOTE — PROGRESS NOTES
The patient called inquiring about the completion of [employer] Forms.  PCP dictation suggests that the patient was going to bring forms in.  The writer printed forms and advised patient that forms will be given to PCP for his completion but there is a portion that needs to be completed by the patient as well. The patient expressed understanding.  Forms put in PCP's mail slot.  Please contact patient when provider portion is completed.

## 2018-07-26 NOTE — PATIENT INSTRUCTIONS
"We will contact you with the results of your urine test today.  If negative, please follow-up in 3 months with Dr King for a cystoscopy.        AFTER YOUR CYSTOSCOPY        You have just completed a cystoscopy, or \"cysto\", which allowed your physician to learn more about your bladder (or to remove a stent placed after surgery). We suggest that you continue to avoid caffeine, fruit juice, and alcohol for the next 24 hours, however, you are encouraged to return to your normal activities.         A few things that are considered normal after your cystoscopy:     * Small amount of bleeding (or spotting) that clears within the next 24 hours     * Slight burning sensation with urination     * Sensation to of needing to avoid more frequently     * The feeling of \"air\" in your urine     * Mild discomfort that is relieved with Tylenol        Please contact our office promptly if you:     * Develop a fever above 101 degrees     * Are unable to urinate     * Develop bright red blood that does not stop     * Severe pain or swelling         Please contact our office with any concerns or questions @[email or web address].  "

## 2018-07-26 NOTE — NURSING NOTE
Chief Complaint   Patient presents with     Cystoscopy     3 month follow-up cystoscopy and cytology no blood in urine for the last 1 1/2 weeks       Liana Aponte, RN-BC, BSN  Urology Care Coordinator

## 2018-07-26 NOTE — PROGRESS NOTES
Follow Up   Dimple Oviedo is a very pleasant 85 year old female who presents with a history of positive FISH and atypical cytology    She underwent bladder biopsy and washings on 4/3/18, all negative    Continued hematuria over the last month mostly, but episode of minimal hematuria 1.5 weeks ago    Reports some incontinence     CYSTOSCOPY PROCEDURE:  After sterile preparation and draping of the patient,  a 16-Slovenian flexible cystoscope was introduced via the urethra.  It was passed without difficulty into the bladder.  The urethra was open without evidence of stricture.  The ureteral orifices were orthotopic.  The bladder mucosa was evaluated using both antegrade and retroflex views and this showed no evidence of any lesions or trabeculation.   Irregular small red spot at the bladder neck 10 o'clock     Assessment:   History of hematuria, positive FISH, but negative biopsies and washings    - Cytology and FISH today   - If positive results biopsy irregular spot    Negative washings, random biopsies etc.    F/u in 3 months with cysto and cytology    Scribe Disclosure:   Barbara INFANTE, am serving as a scribe; to document services personally performed by Stuart King MD based on data collection and the provider's statements to me.     Stuart King MD  Department of Urology Staff  [facility]

## 2018-07-26 NOTE — LETTER
7/26/2018       RE: Dimple Oviedo  [address]  [facility] [address]     Dear Colleague,    Thank you for referring your patient, Dimple Oviedo, to the [facility] UROLOGY AND INST FOR PROSTATE AND UROLOGIC CANCERS [facility]. Please see a copy of my visit note below.    Follow Up   Dimple Oviedo is a very pleasant 85 year old female who presents with a history of positive FISH and atypical cytology    She underwent bladder biopsy and washings on 4/3/18, all negative    Continued hematuria over the last month mostly, but episode of minimal hematuria 1.5 weeks ago    Reports some incontinence     CYSTOSCOPY PROCEDURE:  After sterile preparation and draping of the patient,  a 16-Turkish flexible cystoscope was introduced via the urethra.  It was passed without difficulty into the bladder.  The urethra was open without evidence of stricture.  The ureteral orifices were orthotopic.  The bladder mucosa was evaluated using both antegrade and retroflex views and this showed no evidence of any lesions or trabeculation.   Irregular small red spot at the bladder neck 10 o'clock     Assessment:   History of hematuria, positive FISH, but negative biopsies and washings    - Cytology and FISH today   - If positive results biopsy irregular spot    Negative washings, random biopsies etc.    F/u in 3 months with cysto and cytology    Scribe Disclosure:   I,Barbara Figueroa, am serving as a scribe; to document services personally performed by Stuart King MD based on data collection and the provider's statements to me.     Stuart King MD  Department of Urology Staff  [facility]

## 2018-07-26 NOTE — MR AVS SNAPSHOT
After Visit Summary   7/26/2018    Dimple Oviedo    MRN: [id number]           Patient Information     Date Of Birth          6/23/1933        Visit Information        Provider Department      7/26/2018 2:20 PM Stuart King MD [facility] and [facility] for Prostate and Urologic Cancers        Today's Diagnoses     Gross hematuria    -  1      Care Instructions    We will contact you with the results of your urine test today.  If negative, please follow-up in 3 months with Dr King for a cystoscopy.              Follow-ups after your visit        Your next 10 appointments already scheduled     Aug 06, 2018 12:00 PM CDT   (Arrive by 11:45 AM)   Lymphedema Treatment with Estela Lares PT   [facility])    [address] [place] [address]  [address]  [facility] [address]   [phone]            Aug 08, 2018 10:00 AM CDT   (Arrive by 9:45 AM)   Lymphedema Treatment with Crista Jain PT   [facility] ([facility])    [address] [place] [address]  [address]  [facility] [address]   [phone]            Aug 10, 2018 12:30 PM CDT   (Arrive by 12:15 PM)   Lymphedema Treatment with Crista Jain PT   [facility] ([facility])    [address] [place] [address]  [address]  [facility] [address]   [phone]            Aug 13, 2018 12:00 PM CDT   (Arrive by 11:45 AM)   Lymphedema Treatment with Estela Lares PT   [facility] ([facility])    [address] [place] [address]  [address]  [facility] [address]   [phone]            Aug 14, 2018  4:00 PM CDT   (Arrive by 3:45 PM)   RETURN ENDOCRINE with Dhara Meza MD   [facility] ([facility])    [address] [place] [address]  [address] [facility] [address]   [phone]            Aug 15, 2018 12:15 PM CDT    (Arrive by 12:00 PM)   Lymphedema Treatment with Crista Jain, PT   [facility] ([facility])    [address]  [address]  [facility] [address]   [phone]            Aug 17, 2018 12:30 PM CDT   (Arrive by 12:15 PM)   Lymphedema Treatment with Crista Jain PT   [facility] ([facility])    [address]  [address]  [facility] [address]   [phone]            Aug 20, 2018 12:00 PM CDT   (Arrive by 11:45 AM)   Lymphedema Treatment with Estela Lares, PT   [facility] ([facility])    [address] [place] [address]  [address]  [facility] [address]   [phone]            Aug 22, 2018 12:30 PM CDT   (Arrive by 12:15 PM)   Lymphedema Treatment with Crista Jain, PT   [facility] ([facility])    [address]  [address]  [facility] [address]   [phone]            Aug 24, 2018 12:30 PM CDT   (Arrive by 12:15 PM)   Lymphedema Treatment with Crista Jain PT   [facility] ([facility])    [address] [place] [address]  [address]  [facility] [address]   [phone]              Future tests that were ordered for you today     Open Future Orders        Priority Expected Expires Ordered    Cytology non gyn Routine  7/26/2019 7/26/2018            Who to contact     Please call your clinic at [phone] to:    Ask questions about your health    Make or cancel appointments    Discuss your medicines    Learn about your test results    Speak to your doctor            Additional Information About Your Visit        MyChart Information     [employer] gives you secure access to your electronic health record. If you see a primary care provider, you can also send messages to your care team and make appointments. If you have questions, please call your primary care  "clinic.  If you do not have a primary care provider, please call [phone] and they will assist you.      [employer] is an electronic gateway that provides easy, online access to your medical records. With [employer], you can request a clinic appointment, read your test results, renew a prescription or communicate with your care team.     To access your existing account, please contact your [facility] or call [phone] for assistance.        Care EveryWhere ID     This is your Care EveryWhere ID. This could be used by other organizations to access your [facility] medical records  [id number]        Your Vitals Were     Pulse Height BMI (Body Mass Index)             65 1.448 m (4' 9\") 31.83 kg/m2          Blood Pressure from Last 3 Encounters:   07/26/18 136/64   07/23/18 128/53   07/17/18 125/50    Weight from Last 3 Encounters:   07/26/18 66.7 kg (147 lb 1.6 oz)   07/23/18 66.7 kg (147 lb 1.6 oz)   07/17/18 67.4 kg (148 lb 8 oz)                 Today's Medication Changes          These changes are accurate as of 7/26/18  4:03 PM.  If you have any questions, ask your nurse or doctor.               These medicines have changed or have updated prescriptions.        Dose/Directions    irbesartan 300 MG tablet   Commonly known as:  AVAPRO   This may have changed:    - how much to take  - how to take this  - when to take this  - additional instructions   Used for:  Essential hypertension with goal blood pressure less than 140/90        TAKE 1 TABLET EVERY DAY   Quantity:  90 tablet   Refills:  2       methimazole 5 MG tablet   Commonly known as:  TAPAZOLE   This may have changed:  when to take this   Used for:  Nontoxic multinodular goiter        Dose:  5 mg   Take 1 tablet (5 mg) by mouth daily   Quantity:  90 tablet   Refills:  1       omeprazole 20 MG CR capsule   Commonly known as:  priLOSEC   This may have changed:  when to take this   Used for:  Gastroesophageal reflux disease without " esophagitis        Dose:  20 mg   Take 1 capsule (20 mg) by mouth daily   Quantity:  180 capsule   Refills:  3       sertraline 100 MG tablet   Commonly known as:  ZOLOFT   This may have changed:  when to take this   Used for:  THERON (generalized anxiety disorder)        Dose:  100 mg   Take 1 tablet (100 mg) by mouth daily   Quantity:  90 tablet   Refills:  1                Primary Care Provider Office Phone # Fax #    Pop Antonino Zapien [phone] [address] [place], [place] [id number]        Equal Access to Services     [facility]: Hadii shameka chacon hadasho Sogabo, waaxda luqadaha, qaybta kaalmada adeurszulayacristian, maldonado seay . So [facility] [phone].    ATENCIÓN: Si habla español, tiene a sierra disposición servicios gratuitos de asistencia lingüística. [facility] [phone].    We comply with applicable federal civil rights laws and [place] laws. We do not discriminate on the basis of race, color, national origin, age, disability, sex, sexual orientation, or gender identity.            Thank you!     Thank you for choosing [facility] UROLOGY AND [facility] FOR PROSTATE AND UROLOGIC CANCERS  for your care. Our goal is always to provide you with excellent care. Hearing back from our patients is one way we can continue to improve our services. Please take a few minutes to complete the written survey that you may receive in the mail after your visit with us. Thank you!             Your Updated Medication List - Protect others around you: Learn how to safely use, store and throw away your medicines at www.disposemymeds.org.          This list is accurate as of 7/26/18  4:03 PM.  Always use your most recent med list.                   Brand Name Dispense Instructions for use Diagnosis    acetaminophen 650 MG 8 hour tablet     100 tablet    Take 650 mg by mouth every 8 hours as needed for mild pain or fever    Ascending cholangitis       alendronate 70 MG tablet    FOSAMAX    12  "tablet    TAKE 1 TABLET EVERY 7 DAYS AT LEAST 60 MINUTES BEFORE BREAKFAST AS DIRECTED \"SEE PACKAGE FOR ADDITIONAL INSTRUCTIONS\"    High risk medication use, Osteopenia       ASPIRIN PO      Take 81 mg by mouth At Bedtime        CALCIUM + D PO      Take by mouth 2 times daily        colestipol 1 g tablet    COLESTID     TK 1 T PO BID - TAKE OTHER MEDS 1 HOUR BEFORE OR 4 HOURS AFTER COLESID        CRANBERRY CONCENTRATE PO      Take 2 capsules by mouth At Bedtime        cycloSPORINE 0.05 % ophthalmic emulsion    RESTASIS     Place 1 drop into both eyes 2 times daily        Fish Oil 500 MG Caps      Take 1 capsule by mouth 2 times daily        furosemide 20 MG tablet    LASIX     Take 1 tablet (20 mg) by mouth every morning    Stasis edema of both lower extremities, (HFpEF) heart failure with preserved ejection fraction (H)       irbesartan 300 MG tablet    AVAPRO    90 tablet    TAKE 1 TABLET EVERY DAY    Essential hypertension with goal blood pressure less than 140/90       IRON SUPPLEMENT PO           lovastatin 40 MG tablet    MEVACOR    90 tablet    TAKE 1 TABLET AT BEDTIME (HYPERLIPIDEMIA LDL GOAL BELOW 130)    Hyperlipidemia LDL goal <130       Melatonin 10 MG Tabs tablet      Take 10 mg by mouth nightly as needed for sleep        METAMUCIL FIBER PO      Take by mouth 3 times daily        methimazole 5 MG tablet    TAPAZOLE    90 tablet    Take 1 tablet (5 mg) by mouth daily    Nontoxic multinodular goiter       nitroGLYcerin 0.4 MG sublingual tablet    NITROSTAT    25 tablet    For chest pain place 1 tablet under the tongue every 5 minutes for 3 doses. If symptoms persist 5 minutes after 1st dose call 911.    Elevated troponin       omeprazole 20 MG CR capsule    priLOSEC    180 capsule    Take 1 capsule (20 mg) by mouth daily    Gastroesophageal reflux disease without esophagitis       PROBIOTIC ADVANCED PO           propranolol 60 MG 24 hr capsule    INDERAL LA    90 capsule    Take 1 capsule (60 mg) by mouth " daily    Nontoxic multinodular goiter       rOPINIRole 0.25 MG tablet    REQUIP    90 tablet    TAKE 1 TABLET(0.25 MG) BY MOUTH EVERY NIGHT AS NEEDED    Restless legs syndrome       sertraline 100 MG tablet    ZOLOFT    90 tablet    Take 1 tablet (100 mg) by mouth daily    THERON (generalized anxiety disorder)       SYSTANE 0.4-0.3 % Soln ophthalmic solution   Generic drug:  polyethylene glycol 0.4%- propylene glycol 0.3%      Place 1 drop into both eyes 3 times daily as needed for dry eyes        traZODone 50 MG tablet    DESYREL    90 tablet    TAKE 1 TABLET(50 MG) BY MOUTH EVERY NIGHT AS NEEDED FOR SLEEP    Insomnia, unspecified type       VALIUM PO      Take 2 mg by mouth every 12 hours as needed for anxiety

## 2018-07-27 NOTE — PROGRESS NOTES
Called pt and let her know forms have been filled out and placed at  for her to . She will  when she comes in for lab appt 8/2.   Made a copy for abstraction.     Amberly De León, VIRGEN

## 2018-08-02 DIAGNOSIS — E05.90 HYPERTHYROIDISM: ICD-10-CM

## 2018-08-02 LAB — T3 SERPL-MCNC: 116 NG/DL (ref 60–181)

## 2018-08-02 PROCEDURE — 84439 ASSAY OF FREE THYROXINE: CPT | Performed by: INTERNAL MEDICINE

## 2018-08-02 PROCEDURE — 84443 ASSAY THYROID STIM HORMONE: CPT | Performed by: FAMILY MEDICINE

## 2018-08-02 PROCEDURE — 36415 COLL VENOUS BLD VENIPUNCTURE: CPT | Performed by: INTERNAL MEDICINE

## 2018-08-02 PROCEDURE — 84480 ASSAY TRIIODOTHYRONINE (T3): CPT | Performed by: INTERNAL MEDICINE

## 2018-08-03 LAB
COPATH REPORT: NORMAL
T4 FREE SERPL-MCNC: 1.12 NG/DL (ref 0.76–1.46)
TSH SERPL DL<=0.005 MIU/L-ACNC: <0.01 MU/L (ref 0.4–4)

## 2018-08-06 ENCOUNTER — HOSPITAL ENCOUNTER (OUTPATIENT)
Dept: PHYSICAL THERAPY | Facility: CLINIC | Age: 83
Setting detail: THERAPIES SERIES
End: 2018-08-06
Attending: FAMILY MEDICINE
Payer: MEDICARE

## 2018-08-06 PROCEDURE — 40000449 ZZHC STATISTIC PT VISIT, LYMPHEDEMA: Mod: ZF | Performed by: PHYSICAL THERAPIST

## 2018-08-06 PROCEDURE — 97535 SELF CARE MNGMENT TRAINING: CPT | Mod: GP,ZF | Performed by: PHYSICAL THERAPIST

## 2018-08-06 PROCEDURE — 97140 MANUAL THERAPY 1/> REGIONS: CPT | Mod: GP,ZF | Performed by: PHYSICAL THERAPIST

## 2018-08-08 ENCOUNTER — HOSPITAL ENCOUNTER (OUTPATIENT)
Dept: PHYSICAL THERAPY | Facility: CLINIC | Age: 83
Setting detail: THERAPIES SERIES
End: 2018-08-08
Attending: FAMILY MEDICINE
Payer: MEDICARE

## 2018-08-08 PROCEDURE — 40000449 ZZHC STATISTIC PT VISIT, LYMPHEDEMA: Mod: ZF | Performed by: PHYSICAL THERAPIST

## 2018-08-08 PROCEDURE — 97140 MANUAL THERAPY 1/> REGIONS: CPT | Mod: GP,ZF | Performed by: PHYSICAL THERAPIST

## 2018-08-09 ASSESSMENT — ENCOUNTER SYMPTOMS
DISTURBANCES IN COORDINATION: 0
SLEEP DISTURBANCES DUE TO BREATHING: 0
NIGHT SWEATS: 0
SINUS CONGESTION: 0
TASTE DISTURBANCE: 1
SNORES LOUDLY: 0
DYSPNEA ON EXERTION: 0
HEADACHES: 0
COUGH DISTURBING SLEEP: 1
HOARSE VOICE: 1
DYSURIA: 0
MUSCLE CRAMPS: 1
LIGHT-HEADEDNESS: 1
HOT FLASHES: 1
INSOMNIA: 1
NECK MASS: 0
NERVOUS/ANXIOUS: 1
EXERCISE INTOLERANCE: 1
PALPITATIONS: 0
HYPERTENSION: 1
BLOOD IN STOOL: 0
RECTAL PAIN: 0
DIARRHEA: 1
FLANK PAIN: 0
ABDOMINAL PAIN: 1
POOR WOUND HEALING: 0
FEVER: 0
SHORTNESS OF BREATH: 0
LEG PAIN: 1
ARTHRALGIAS: 1
PANIC: 0
DEPRESSION: 0
WEIGHT LOSS: 0
ORTHOPNEA: 0
VOMITING: 0
TREMORS: 1
LOSS OF CONSCIOUSNESS: 0
DIFFICULTY URINATING: 0
HEMOPTYSIS: 0
STIFFNESS: 0
SINUS PAIN: 0
POSTURAL DYSPNEA: 0
HEARTBURN: 0
CONSTIPATION: 0
POLYDIPSIA: 0
DECREASED LIBIDO: 0
SKIN CHANGES: 0
DECREASED CONCENTRATION: 0
PARALYSIS: 0
NUMBNESS: 0
JOINT SWELLING: 0
SPEECH CHANGE: 0
NECK PAIN: 0
BOWEL INCONTINENCE: 0
BACK PAIN: 0
HEMATURIA: 1
WEIGHT GAIN: 0
MUSCLE WEAKNESS: 1
ALTERED TEMPERATURE REGULATION: 1
WHEEZING: 0
WEAKNESS: 0
MEMORY LOSS: 0
SWOLLEN GLANDS: 0
POLYPHAGIA: 0
HYPOTENSION: 1
DECREASED APPETITE: 0
HALLUCINATIONS: 0
JAUNDICE: 0
BLOATING: 0
DIZZINESS: 1
NAUSEA: 0
FATIGUE: 1
COUGH: 1
TINGLING: 0
INCREASED ENERGY: 0
CHILLS: 0
NAIL CHANGES: 0
SYNCOPE: 0
SEIZURES: 0
SORE THROAT: 0
MYALGIAS: 0
BRUISES/BLEEDS EASILY: 0
TROUBLE SWALLOWING: 0
SPUTUM PRODUCTION: 0

## 2018-08-10 ENCOUNTER — HOSPITAL ENCOUNTER (OUTPATIENT)
Dept: PHYSICAL THERAPY | Facility: CLINIC | Age: 83
Setting detail: THERAPIES SERIES
End: 2018-08-10
Attending: FAMILY MEDICINE
Payer: MEDICARE

## 2018-08-10 PROCEDURE — 40000449 ZZHC STATISTIC PT VISIT, LYMPHEDEMA: Mod: ZF | Performed by: PHYSICAL THERAPIST

## 2018-08-10 PROCEDURE — 97140 MANUAL THERAPY 1/> REGIONS: CPT | Mod: GP,ZF | Performed by: PHYSICAL THERAPIST

## 2018-08-13 ENCOUNTER — HOSPITAL ENCOUNTER (OUTPATIENT)
Dept: PHYSICAL THERAPY | Facility: CLINIC | Age: 83
Setting detail: THERAPIES SERIES
End: 2018-08-13
Attending: FAMILY MEDICINE
Payer: MEDICARE

## 2018-08-13 DIAGNOSIS — I89.0 LYMPHEDEMA OF BOTH LOWER EXTREMITIES: Primary | ICD-10-CM

## 2018-08-13 PROCEDURE — 97110 THERAPEUTIC EXERCISES: CPT | Mod: GP,ZF | Performed by: PHYSICAL THERAPIST

## 2018-08-13 PROCEDURE — 40000449 ZZHC STATISTIC PT VISIT, LYMPHEDEMA: Mod: ZF | Performed by: PHYSICAL THERAPIST

## 2018-08-13 PROCEDURE — 97140 MANUAL THERAPY 1/> REGIONS: CPT | Mod: GP,ZF | Performed by: PHYSICAL THERAPIST

## 2018-08-14 ENCOUNTER — OFFICE VISIT (OUTPATIENT)
Dept: ENDOCRINOLOGY | Facility: CLINIC | Age: 83
End: 2018-08-14
Payer: MEDICARE

## 2018-08-14 VITALS
HEART RATE: 60 BPM | BODY MASS INDEX: 32.01 KG/M2 | DIASTOLIC BLOOD PRESSURE: 55 MMHG | SYSTOLIC BLOOD PRESSURE: 129 MMHG | HEIGHT: 57 IN | WEIGHT: 148.4 LBS

## 2018-08-14 DIAGNOSIS — E05.00 GRAVES DISEASE: Primary | ICD-10-CM

## 2018-08-14 ASSESSMENT — PAIN SCALES - GENERAL: PAINLEVEL: NO PAIN (0)

## 2018-08-14 NOTE — LETTER
8/14/2018       RE: Dimple Oviedo  [address]  [facility] [address]     Dear Colleague,    Thank you for referring your patient, Dimple Oviedo, to the [facility] ENDOCRINOLOGY at [facility]. Please see a copy of my visit note below.    Ms. Oviedo is a 85-year old with hyperthyroidism here for follow-up. She is here with her son and daughter-in-law.    She first had a thyroid nodule removed in the 1960s. In December 2017, she developed symptoms of hyperthyroidism including termor, feeling hot, and diarrheal symptoms. Thyroid USG on 12/2017 showed f/s/o MNG. She was started on Methimazole 5 mg TID. She was admitted in 12/2017 for cardiomyopathy, requiring an angiogram with iodine dye. In January 2018, she developed palpitations and was put on beta blockers, which she continues to take. Her TSI was elevated at 3.7 on 5/2018. Thyroid uptake scan on 07/2018 showed cold nodule in right lobe superior laterally, with diffusely increased uptake in the remainder of the thyroid.    Her Methimazole was reduced to 5 mg bid, but she did not make the change until last Friday. Today, she feels better. Her tremor has decreased considerably, and is most notable in the am. She continues to have heat intolerance, some sweating, and has noticed her hair thinning. She has loose stools in the mornings, and sometimes several times a day. She has much higher appetite. She continues to have intermittent hematuria, which has been followed-up with urology previously. She denies palpitations, CP, SOB, HERMAN, skin changes, eye changes such as dryness or redness. She recently had cataract surgery.     ROS  Constitutional: No significant weight loss/gain. Increased appetite as described in HPI.   Skin: No skin changes, rashes, bruises.   Eyes: Cataract surgery in June. No dryness, redness, change in vision otherwise.  ENT: Some hoarseness. No difficulty swallowing, post-nasal drip, tinnitus.  "  Resp: No SOB, cough, some dyspnea if she walks for too long.   CV: No chest pain, palpitations.   GI: No nausea, vomiting, abdominal pain. Some loose stools as described in HPI.   : Hematuria as described in HPI. No urgency, frequency, hesitancy, odors, dysuria.   MSK: No myalgias. Long-standing knee pain.  Neuro: No parasthesias, headache.      Past Medical History:   Diagnosis Date     Calculus of kidney      Esophageal reflux      GERD (gastroesophageal reflux disease)      Hyperlipidemia LDL goal <130 5/9/2010     Malignant melanoma of skin of trunk, except scrotum (H)      Nonspecific abnormal finding     has living will 2004 -      Nontoxic multinodular goiter     no further eval /tx rec per pt     Osteopenia      Other psoriasis      Personal history of colonic polyps      PMR (polymyalgia rheumatica) (H)      Stress-induced cardiomyopathy      Undiagnosed cardiac murmurs      Unspecified constipation      Unspecified essential hypertension      Physical Exam  /55  Pulse 60  Ht 1.448 m (4' 9.01\")  Wt 67.3 kg (148 lb 6.4 oz)  BMI 32.1 kg/m2    Gen: NAD, pleasant, answers questions appropriately.  Eyes: Sclera non-icteric, no lid lad, stare, conjunctival injection, periorbital edema. EOM grossly intact.  Neck: Thyroid diffusely enlarged (Left lobe > right), with nodules. Oropharynx clear.   CV: RRR, no murmurs, rubs, gallops.  Pulm: CTAB, no wheezes, crackles.  Neuro: Biceps, triceps, brachioradialis, patellar DTRs mildly exaggerated b/l.   Extremities: UE tremor+. Edema in LE extending to knees b/l (unable to observe due to wrapping).         TSH   Date Value Ref Range Status   08/02/2018 <0.01 (L) 0.40 - 4.00 mU/L Final   07/23/2018 <0.01 (L) 0.40 - 4.00 mU/L Final   07/17/2018 <0.01 (L) 0.40 - 4.00 mU/L Final   06/21/2018 <0.01 (L) 0.40 - 4.00 mU/L Final   06/11/2018 0.02 (L) 0.40 - 4.00 mU/L Final     T4 Free   Date Value Ref Range Status   08/02/2018 1.12 0.76 - 1.46 ng/dL Final   07/23/2018 " 2.08 (H) 0.76 - 1.46 ng/dL Final   07/17/2018 2.04 (H) 0.76 - 1.46 ng/dL Final   06/21/2018 1.93 (H) 0.76 - 1.46 ng/dL Final   06/11/2018 1.22 0.76 - 1.46 ng/dL Final     FT3 as follows:  8/02/18: 116  7/23/18: 182  7/17/18: 208  6/21/18: 212  6/11/18: 134  5/28/17: 83    Assessment/Plan  1. Hyperthyroidism: Ms. Oviedo is doing better today. Her TSI and TSH/T4 suggest Grave's disease. Today, her TSH was <0.01 T4 1.24 and FT3 116. Her previous TSH, T4 and T3 were <0.01, 2.08 and 116.   -- She will reduce methimazole dose to 5 mg every day. Will recheck TFT early next week.   -- Will continue with methimazole for now, and redo thyroid USG in 6 months. Given cold nodule and increase in T4 after brief discontinuation of methimazole, LUNA does not seem appropriate and may exacerbate hyperthyroidism issues.   -- Discussed that some changes she is experiencing (hoarse throat, hair loss) will change or resolve with ongoing therapy.    2. Hematuria: Will follow-up with urology.     RTC 4 months with Dr. Meza.     I, Kindra Carter MS3, saw and examined this patient in the presence of Dr. Dhara Meza MD.      This 85-year-old woman was seen and examined by me with medical student Kindra Carter, who served as scribe.Please see student's note of the same date for full details.   Mrs. Oviedo was accompanied by her son and daughter-in-law.     In brief,  she first developed symptoms of hyperthyroidism in December.  She was most troubled by the tremor that occurred.  She had not noticed change in her skin or hair.  She saw Dr. Rojas in the community and he started her on methimazole.  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  Because she had received an iodine dye load when she had a coronary angiogram, we were unable to do a radioiodine scan and uptake to determine the precise cause of her hyperthyroidism.  She was maintained on methimazole  "until a few weeks ago when it was stopped.  The radioiodine uptake and scan was done and confirmed she had Graves' disease.  Because she developed hyperthyroidism with symptoms, she was put back on methimazole.  This dose was reduced from 5 mg 3 times daily to 5 mg twice daily 3 days ago.  With the addition of the methimazole, her symptoms of tremor and palpitations are essentially resolved..  She reports that her breathing is fine.  She does get tired when she walks a distance.  She has regular f/u with cardiology.      Current Outpatient Prescriptions on File Prior to Visit:  acetaminophen 650 MG TABS Take 650 mg by mouth every 8 hours as needed for mild pain or fever   alendronate (FOSAMAX) 70 MG tablet TAKE 1 TABLET EVERY 7 DAYS AT LEAST 60 MINUTES BEFORE BREAKFAST AS DIRECTED \"SEE PACKAGE FOR ADDITIONAL INSTRUCTIONS\"   ASPIRIN PO Take 81 mg by mouth At Bedtime   Calcium Citrate-Vitamin D (CALCIUM + D PO) Take by mouth 2 times daily   colestipol (COLESTID) 1 g tablet TK 1 T PO BID - TAKE OTHER MEDS 1 HOUR BEFORE OR 4 HOURS AFTER COLESID   CRANBERRY CONCENTRATE PO Take 2 capsules by mouth At Bedtime    cycloSPORINE (RESTASIS) 0.05 % ophthalmic emulsion Place 1 drop into both eyes 2 times daily   DiazePAM (VALIUM PO) Take 2 mg by mouth every 12 hours as needed for anxiety   Ferrous Sulfate (IRON SUPPLEMENT PO)    furosemide (LASIX) 20 MG tablet Take 1 tablet (20 mg) by mouth every morning   irbesartan (AVAPRO) 300 MG tablet TAKE 1 TABLET EVERY DAY (Patient taking differently: Take 300 mg by mouth every morning TAKE 1 TABLET EVERY DAY)   lovastatin (MEVACOR) 40 MG tablet TAKE 1 TABLET AT BEDTIME (HYPERLIPIDEMIA LDL GOAL BELOW 130)   Melatonin 10 MG TABS tablet Take 10 mg by mouth nightly as needed for sleep   methimazole (TAPAZOLE) 5 MG tablet Take 1 tablet (5 mg) by mouth daily (Patient taking differently: Take 5 mg by mouth 3 times daily )   nitroGLYcerin (NITROSTAT) 0.4 MG sublingual tablet For chest pain place " "1 tablet under the tongue every 5 minutes for 3 doses. If symptoms persist 5 minutes after 1st dose call 911.   Omega-3 Fatty Acids (FISH OIL) 500 MG CAPS Take 1 capsule by mouth 2 times daily    omeprazole (PRILOSEC) 20 MG CR capsule Take 1 capsule (20 mg) by mouth daily (Patient taking differently: Take 20 mg by mouth five times a week )   order for DME Equipment being ordered: Knee high compression for B LE 20-30mmHg, Velcro units for night time (consider hybrid sock as foot swelling is less than leg swelling), Donning device   polyethylene glycol 0.4%- propylene glycol 0.3% (SYSTANE) 0.4-0.3 % SOLN ophthalmic solution Place 1 drop into both eyes 3 times daily as needed for dry eyes   Probiotic Product (PROBIOTIC ADVANCED PO)    propranolol (INDERAL LA) 60 MG 24 hr capsule Take 1 capsule (60 mg) by mouth daily   Psyllium (METAMUCIL FIBER PO) Take by mouth 3 times daily   rOPINIRole (REQUIP) 0.25 MG tablet TAKE 1 TABLET(0.25 MG) BY MOUTH EVERY NIGHT AS NEEDED   sertraline (ZOLOFT) 100 MG tablet Take 1 tablet (100 mg) by mouth daily (Patient taking differently: Take 100 mg by mouth every morning )   traZODone (DESYREL) 50 MG tablet TAKE 1 TABLET(50 MG) BY MOUTH EVERY NIGHT AS NEEDED FOR SLEEP     No current facility-administered medications on file prior to visit.     ROS: 10 point ROS neg other than the symptoms noted above in the HPI.    So Hx - now living in her own home    Vital signs:   /55  Pulse 60  Ht 1.448 m (4' 9.01\")  Wt 67.3 kg (148 lb 6.4 oz)  BMI 32.1 kg/m2  Estimated body mass index is 32.1 kg/(m^2) as calculated from the following:    Height as of this encounter: 1.448 m (4' 9.01\").    Weight as of this encounter: 67.3 kg (148 lb 6.4 oz).    VSS  NAD  Eyes - no periorbital edema, conjunctival injection, scleral icterus  Neck -her thyroid gland is 2-3 times greater than normal.  The right lobe is smooth and soft without distinct nodules.  The left lobe is also smooth and soft but has " asymmetry in the lower lobe but feels like a nodule.  Lungs - clear  CV - RRR.  NormalS1, S2 w/o murmur or gallop  Neuro - .  DTR 3/4 biceps, no tremor  Skin - normal texture   Mood- even    Examination:  NM THYROID UPTAKE AND SCAN  Examination: Nuclear medicine thyroid uptake and scan, on 7/3/2018  11:27 AM.     Indication:  Would like to treat with radioactive iodine if  uptake/scan shows she has Graves or if uptake/scan shows she has hot  nodule; Hyperthyroidism     Additional Information: None.     Comparison: Ultrasound thyroid 12/18/2017.     Technique:     The patient received 255 uci of I-123 orally.  Uptake measurements and  scan was obtained at 24 hours.     Findings:     The laboratory assessment for hyperthyroidism determined increased  thyroid hormones.     The uptake at 24 hours was measured at 76.3%.  The normal range at 24  hours is from 10 to 30%.  Uptake on right: 39.3%,  Uptake on Left: 37%.     Total computer estimated weight of the gland: 62.7 gm,  Right gland  weight: 39.7 gm,  Left gland weight: 22.9 gm.     Images of the gland demonstrates diffusely increased uptake with focal  area of photopenia in the right lobe superior laterally, corresponding  to a cold nodule.         Impression:     1. 76.3% uptake at 24 hours consistent with hyperthyroidism     2. Cold nodule in the right lobe of thyroid; remainder thyroid shows  increased uptake. Recommend tissue diagnosis           Findings in this case were discussed with Dr. Meza by Dr. Henry at  11:45 AM. 7/3/2018 11:58 AM     I have personally reviewed the examination and initial interpretation  and I agree with the findings.     KERRI AYALA MD           ENDO THYROID LABS-[facility] Latest Ref Rng & Units 8/2/2018 7/23/2018   TSH 0.40 - 4.00 mU/L <0.01 (L) <0.01 (L)   T4 FREE 0.76 - 1.46 ng/dL 1.12 2.08 (H)   FREE T3 2.3 - 4.2 pg/mL     TRIIODOTHYRONINE(T3) 60 - 181 ng/dL 116 182 (H)   THYROGLOBULIN ANTIBODY <40 IU/mL     THYR PEROXIDASE ZARA <35  IU/mL     THYROID STIM IMMUNOG <=1.3 TSI index       ENDO THYROID LABS-[facility] Latest Ref Rng & Units 7/17/2018 6/21/2018   TSH 0.40 - 4.00 mU/L <0.01 (L) <0.01 (L)   T4 FREE 0.76 - 1.46 ng/dL 2.04 (H) 1.93 (H)   FREE T3 2.3 - 4.2 pg/mL     TRIIODOTHYRONINE(T3) 60 - 181 ng/dL 208 (H) 212 (H)   THYROGLOBULIN ANTIBODY <40 IU/mL     THYR PEROXIDASE ZARA <35 IU/mL     THYROID STIM IMMUNOG <=1.3 TSI index       ENDO THYROID LABS-[facility] Latest Ref Rng & Units 6/11/2018 5/29/2018   TSH 0.40 - 4.00 mU/L 0.02 (L) 2.10   T4 FREE 0.76 - 1.46 ng/dL 1.22 0.72 (L)   FREE T3 2.3 - 4.2 pg/mL     TRIIODOTHYRONINE(T3) 60 - 181 ng/dL 134 83   THYROGLOBULIN ANTIBODY <40 IU/mL     THYR PEROXIDASE ZARA <35 IU/mL     THYROID STIM IMMUNOG <=1.3 TSI index  3.7 (H)     ENDO THYROID LABS-[facility] Latest Ref Rng & Units 3/29/2018 3/14/2018   TSH 0.40 - 4.00 mU/L 2.72 1.94   T4 FREE 0.76 - 1.46 ng/dL 0.69 (L)    FREE T3 2.3 - 4.2 pg/mL     TRIIODOTHYRONINE(T3) 60 - 181 ng/dL     THYROGLOBULIN ANTIBODY <40 IU/mL     THYR PEROXIDASE ZARA <35 IU/mL     THYROID STIM IMMUNOG <=1.3 TSI index       EXAMINATION: Thyroid ultrasound, 12/18/2017 1:49 PM      COMPARISON: Chest CT 12/13/2017, thyroid ultrasound 10/26/2006     HISTORY: The referring physician requested comparison to I123 scan -  to be done prior to this U/S Multinodular goiter; however the patient  deferred the nuclear medicine exam secondary to recent myocardial  infarction.     Technique: Grayscale and color ultrasound imaging of the thyroid was  performed.     Findings:    The thyroid is enlarged and diffusely heterogeneous with the  parenchyma composed of innumerable nodules, predominantly solid with  some cystic changes well. There are coarse, dystrophic appearing  calcifications on the left as well as punctate echogenic foci, many of  which demonstrate ring down/comet tail artifact, likely inspissated  colloid, with more pronounced associated cysts seen on prior  ultrasound of 10/2006 in the left  lobe of the thyroid gland. There are  additional punctate echogenic foci in both lobes which may represent  microcalcifications. Multiple nodules demonstrate hypervascularity.     The right lobe of the thyroid measures: 4.4 x 3.3 x 5.1 cm.      The thyroid isthmus measures: 0.6 cm.     The left lobe of the thyroid measures: 2.4 x 3.3 x 5.3 cm.          Impression:  Enlarged heterogeneous thyroid gland composed of innumerable nodules,  predominantly solid with some cystic changes well. Coarse, dystrophic  appearing calcifications on the left as well as punctate echogenic  foci bilaterally, likely inspissated colloid with possible  microcalcifications. Comparison to I-123 scan was requested however  the patient declined that exam.     I have personally reviewed the examination and initial interpretation  and I agree with the findings.     KASANDRA WALKER MD           Assessment and plan:    Based on the recent radio iodine uptake and scan, it is clear that the cause of Mrs. Oviedo is hyperthyroidism is Graves' disease.  This was previously suggested by the borderline elevation in her TSI.  On the recent thyroid scan, she was noted to have a nodule on the left that is cold.  Her ultrasound demonstrated multiple small nodules, none that are particularly concerning in appearance.  We elected to treat her Graves' disease with methimazole.  The reasoning behind this decision was that she was too symptomatic to risk having an increase in her thyroid hormone levels following radiation treatment.  While her cardiac disease is stable, we did not want to put her at risk for exacerbation.  My plan will be to continue treatment for 1-2 years and then determine if the hyperthyroidism persists.    Once she is euthyroid and has been there for several months, I will plan to repeat the ultrasound to evaluate the cold nodule identified on the left.    I recommended she decrease her dose of methimazole to 5 mg once a day.  I will repeat  her thyroid tests next week to make further adjustments in doses.  She is scheduled to see me again in September.    She has no evidence of Graves' ophthalmopathy but I will continue to follow this carefully.    Dhara Meza MD

## 2018-08-14 NOTE — MR AVS SNAPSHOT
After Visit Summary   8/14/2018    Dimple Oviedo    MRN: [id number]           Patient Information     Date Of Birth          6/23/1933        Visit Information        Provider Department      8/14/2018 4:00 PM Dhara Meza MD [facility] Endocrinology         Follow-ups after your visit        Your next 10 appointments already scheduled     Aug 15, 2018 12:15 PM CDT   (Arrive by 12:00 PM)   Lymphedema Treatment with Crista Jain PT   [facility] ([facility])    [address] [place] [address]  [address]  [facility] [address]   [phone]            Aug 17, 2018 12:30 PM CDT   (Arrive by 12:15 PM)   Lymphedema Treatment with Crista Jain PT   [facility] ([facility])    [address] [place] [address]  [address]  [facility] [address]   [phone]            Aug 20, 2018 12:00 PM CDT   (Arrive by 11:45 AM)   Lymphedema Treatment with Estela Lares PT   [facility] ([facility])    [address] [place] [address]  [address]  [facility] [address]   [phone]            Aug 22, 2018 12:30 PM CDT   (Arrive by 12:15 PM)   Lymphedema Treatment with Crista Jain PT   [facility] ([facility])    [address] [place] [address]  [address]  [facility] [address]   [phone]            Aug 24, 2018 12:30 PM CDT   (Arrive by 12:15 PM)   Lymphedema Treatment with Crista Jain PT   [facility] ([facility])    [address]  [address]  [facility] [address]   [phone]            Aug 29, 2018 12:30 PM CDT   (Arrive by 12:15 PM)   Lymphedema Treatment with Crista Jain PT   [facility] ([facility])    [address]  [address]  [facility] [address]   [phone]            Aug 31, 2018 12:30 PM CDT   (Arrive by 12:15  PM)   Lymphedema Treatment with Crista Jain PT   [facility] ([facility])    [address]  [address]  [facility] [address] [phone]            Sep 06, 2018 12:30 PM CDT   (Arrive by 12:15 PM)   NEW ARRHYTHMIA with Butch Griffith MD   [facility] ([facility])    [address]  [address]  [facility] [address] [phone]            Oct 03, 2018 12:30 PM CDT   (Arrive by 12:15 PM)   Lymphedema Treatment with Crista Jain PT   [facility] ([facility])    [address]  [address]  [facility] [address] [phone]            Nov 26, 2018  1:30 PM CST   US THYROID with UCUS2   [facility] ([facility])    [address]  1st Floor  [facility] [address] [phone]           Please bring a list of your medicines (including vitamins, minerals and over-the-counter drugs). Also, tell your doctor about any allergies you may have. Wear comfortable clothes and leave your valuables at home.  You do not need to do anything special to prepare for your exam.  Please call the Imaging Department at your exam site with any questions.              Who to contact     Please call your clinic at [phone] to:    Ask questions about your health    Make or cancel appointments    Discuss your medicines    Learn about your test results    Speak to your doctor            Additional Information About Your Visit        [employer] Information     [employer] gives you secure access to your electronic health record. If you see a primary care provider, you can also send messages to your care team and make appointments. If you have questions, please call your primary care clinic.  If you do not have a primary care provider, please call [phone] and they will assist you.      [employer] is an electronic gateway that provides  "easy, online access to your medical records. With [employer], you can request a clinic appointment, read your test results, renew a prescription or communicate with your care team.     To access your existing account, please contact your [facility] or call [phone] for assistance.        Care EveryWhere ID     This is your Care EveryWhere ID. This could be used by other organizations to access your [place] medical records  [id number]        Your Vitals Were     Pulse Height BMI (Body Mass Index)             60 1.448 m (4' 9.01\") 32.1 kg/m2          Blood Pressure from Last 3 Encounters:   08/14/18 129/55   07/26/18 136/64   07/23/18 128/53    Weight from Last 3 Encounters:   08/14/18 67.3 kg (148 lb 6.4 oz)   07/26/18 66.7 kg (147 lb 1.6 oz)   07/23/18 66.7 kg (147 lb 1.6 oz)              Today, you had the following     No orders found for display         Today's Medication Changes          These changes are accurate as of 8/14/18  4:40 PM.  If you have any questions, ask your nurse or doctor.               These medicines have changed or have updated prescriptions.        Dose/Directions    irbesartan 300 MG tablet   Commonly known as:  AVAPRO   This may have changed:    - how much to take  - how to take this  - when to take this  - additional instructions   Used for:  Essential hypertension with goal blood pressure less than 140/90        TAKE 1 TABLET EVERY DAY   Quantity:  90 tablet   Refills:  2       methimazole 5 MG tablet   Commonly known as:  TAPAZOLE   This may have changed:  when to take this   Used for:  Nontoxic multinodular goiter        Dose:  5 mg   Take 1 tablet (5 mg) by mouth daily   Quantity:  90 tablet   Refills:  1       omeprazole 20 MG CR capsule   Commonly known as:  priLOSEC   This may have changed:  when to take this   Used for:  Gastroesophageal reflux disease without esophagitis        Dose:  20 mg   Take 1 capsule (20 mg) by mouth daily   Quantity:  " "180 capsule   Refills:  3       sertraline 100 MG tablet   Commonly known as:  ZOLOFT   This may have changed:  when to take this   Used for:  THERON (generalized anxiety disorder)        Dose:  100 mg   Take 1 tablet (100 mg) by mouth daily   Quantity:  90 tablet   Refills:  1                Primary Care Provider Office Phone # Fax #    Pop Antonino Zapien [phone] [address] [facility] [place] [address]        Equal Access to Services     GUNNER RODRIGUEZ : Hadii aad ku hadasho Soomaali, waaxda luqadaha, qaybta kaalmada adeegyada, waxay idiin hayaan adeeg kelseamianannemarie lafrancisco . So [facility] [phone].    ATENCIÓN: Si habla español, tiene a sierra disposición servicios gratuitos de asistencia lingüística. [facility] [phone].    We comply with applicable federal civil rights laws and [place] laws. We do not discriminate on the basis of race, color, national origin, age, disability, sex, sexual orientation, or gender identity.            Thank you!     Thank you for choosing [facility] ENDOCRINOLOGY  for your care. Our goal is always to provide you with excellent care. Hearing back from our patients is one way we can continue to improve our services. Please take a few minutes to complete the written survey that you may receive in the mail after your visit with us. Thank you!             Your Updated Medication List - Protect others around you: Learn how to safely use, store and throw away your medicines at www.disposemymeds.org.          This list is accurate as of 8/14/18  4:40 PM.  Always use your most recent med list.                   Brand Name Dispense Instructions for use Diagnosis    acetaminophen 650 MG 8 hour tablet     100 tablet    Take 650 mg by mouth every 8 hours as needed for mild pain or fever    Ascending cholangitis       alendronate 70 MG tablet    FOSAMAX    12 tablet    TAKE 1 TABLET EVERY 7 DAYS AT LEAST 60 MINUTES BEFORE BREAKFAST AS DIRECTED \"SEE PACKAGE FOR ADDITIONAL " "INSTRUCTIONS\"    High risk medication use, Osteopenia       ASPIRIN PO      Take 81 mg by mouth At Bedtime        CALCIUM + D PO      Take by mouth 2 times daily        colestipol 1 g tablet    COLESTID     TK 1 T PO BID - TAKE OTHER MEDS 1 HOUR BEFORE OR 4 HOURS AFTER COLESID        CRANBERRY CONCENTRATE PO      Take 2 capsules by mouth At Bedtime        cycloSPORINE 0.05 % ophthalmic emulsion    RESTASIS     Place 1 drop into both eyes 2 times daily        Fish Oil 500 MG Caps      Take 1 capsule by mouth 2 times daily        furosemide 20 MG tablet    LASIX     Take 1 tablet (20 mg) by mouth every morning    Stasis edema of both lower extremities, (HFpEF) heart failure with preserved ejection fraction (H)       irbesartan 300 MG tablet    AVAPRO    90 tablet    TAKE 1 TABLET EVERY DAY    Essential hypertension with goal blood pressure less than 140/90       IRON SUPPLEMENT PO           lovastatin 40 MG tablet    MEVACOR    90 tablet    TAKE 1 TABLET AT BEDTIME (HYPERLIPIDEMIA LDL GOAL BELOW 130)    Hyperlipidemia LDL goal <130       Melatonin 10 MG Tabs tablet      Take 10 mg by mouth nightly as needed for sleep        METAMUCIL FIBER PO      Take by mouth 3 times daily        methimazole 5 MG tablet    TAPAZOLE    90 tablet    Take 1 tablet (5 mg) by mouth daily    Nontoxic multinodular goiter       nitroGLYcerin 0.4 MG sublingual tablet    NITROSTAT    25 tablet    For chest pain place 1 tablet under the tongue every 5 minutes for 3 doses. If symptoms persist 5 minutes after 1st dose call 911.    Elevated troponin       omeprazole 20 MG CR capsule    priLOSEC    180 capsule    Take 1 capsule (20 mg) by mouth daily    Gastroesophageal reflux disease without esophagitis       order for DME     1 each    Equipment being ordered: Knee high compression for B LE 20-30mmHg, Velcro units for night time (consider hybrid sock as foot swelling is less than leg swelling), Donning device    Lymphedema of both lower " extremities       PROBIOTIC ADVANCED PO           propranolol 60 MG 24 hr capsule    INDERAL LA    90 capsule    Take 1 capsule (60 mg) by mouth daily    Nontoxic multinodular goiter       rOPINIRole 0.25 MG tablet    REQUIP    90 tablet    TAKE 1 TABLET(0.25 MG) BY MOUTH EVERY NIGHT AS NEEDED    Restless legs syndrome       sertraline 100 MG tablet    ZOLOFT    90 tablet    Take 1 tablet (100 mg) by mouth daily    THERON (generalized anxiety disorder)       SYSTANE 0.4-0.3 % Soln ophthalmic solution   Generic drug:  polyethylene glycol 0.4%- propylene glycol 0.3%      Place 1 drop into both eyes 3 times daily as needed for dry eyes        traZODone 50 MG tablet    DESYREL    90 tablet    TAKE 1 TABLET(50 MG) BY MOUTH EVERY NIGHT AS NEEDED FOR SLEEP    Insomnia, unspecified type       VALIUM PO      Take 2 mg by mouth every 12 hours as needed for anxiety

## 2018-08-15 ENCOUNTER — HOSPITAL ENCOUNTER (OUTPATIENT)
Dept: PHYSICAL THERAPY | Facility: CLINIC | Age: 83
Setting detail: THERAPIES SERIES
End: 2018-08-15
Attending: FAMILY MEDICINE
Payer: MEDICARE

## 2018-08-15 PROCEDURE — 97140 MANUAL THERAPY 1/> REGIONS: CPT | Mod: GP,ZF | Performed by: PHYSICAL THERAPIST

## 2018-08-15 PROCEDURE — 40000449 ZZHC STATISTIC PT VISIT, LYMPHEDEMA: Mod: ZF | Performed by: PHYSICAL THERAPIST

## 2018-08-15 PROCEDURE — 97535 SELF CARE MNGMENT TRAINING: CPT | Mod: GP,ZF | Performed by: PHYSICAL THERAPIST

## 2018-08-15 NOTE — PROGRESS NOTES
Ms. Oviedo is a 85-year old with hyperthyroidism here for follow-up. She is here with her son and daughter-in-law.    She first had a thyroid nodule removed in the 1960s. In December 2017, she developed symptoms of hyperthyroidism including termor, feeling hot, and diarrheal symptoms. Thyroid USG on 12/2017 showed f/s/o MNG. She was started on Methimazole 5 mg TID. She was admitted in 12/2017 for cardiomyopathy, requiring an angiogram with iodine dye. In January 2018, she developed palpitations and was put on beta blockers, which she continues to take. Her TSI was elevated at 3.7 on 5/2018. Thyroid uptake scan on 07/2018 showed cold nodule in right lobe superior laterally, with diffusely increased uptake in the remainder of the thyroid.    Her Methimazole was reduced to 5 mg bid, but she did not make the change until last Friday. Today, she feels better. Her tremor has decreased considerably, and is most notable in the am. She continues to have heat intolerance, some sweating, and has noticed her hair thinning. She has loose stools in the mornings, and sometimes several times a day. She has much higher appetite. She continues to have intermittent hematuria, which has been followed-up with urology previously. She denies palpitations, CP, SOB, HERMAN, skin changes, eye changes such as dryness or redness. She recently had cataract surgery.     ROS  Constitutional: No significant weight loss/gain. Increased appetite as described in HPI.   Skin: No skin changes, rashes, bruises.   Eyes: Cataract surgery in June. No dryness, redness, change in vision otherwise.  ENT: Some hoarseness. No difficulty swallowing, post-nasal drip, tinnitus.   Resp: No SOB, cough, some dyspnea if she walks for too long.   CV: No chest pain, palpitations.   GI: No nausea, vomiting, abdominal pain. Some loose stools as described in HPI.   : Hematuria as described in HPI. No urgency, frequency, hesitancy, odors, dysuria.   MSK: No myalgias.  "Long-standing knee pain.  Neuro: No parasthesias, headache.      Past Medical History:   Diagnosis Date     Calculus of kidney      Esophageal reflux      GERD (gastroesophageal reflux disease)      Hyperlipidemia LDL goal <130 5/9/2010     Malignant melanoma of skin of trunk, except scrotum (H)      Nonspecific abnormal finding     has living will 2004 -      Nontoxic multinodular goiter     no further eval /tx rec per pt     Osteopenia      Other psoriasis      Personal history of colonic polyps      PMR (polymyalgia rheumatica) (H)      Stress-induced cardiomyopathy      Undiagnosed cardiac murmurs      Unspecified constipation      Unspecified essential hypertension      Physical Exam  /55  Pulse 60  Ht 1.448 m (4' 9.01\")  Wt 67.3 kg (148 lb 6.4 oz)  BMI 32.1 kg/m2    Gen: NAD, pleasant, answers questions appropriately.  Eyes: Sclera non-icteric, no lid lad, stare, conjunctival injection, periorbital edema. EOM grossly intact.  Neck: Thyroid diffusely enlarged (Left lobe > right), with nodules. Oropharynx clear.   CV: RRR, no murmurs, rubs, gallops.  Pulm: CTAB, no wheezes, crackles.  Neuro: Biceps, triceps, brachioradialis, patellar DTRs mildly exaggerated b/l.   Extremities: UE tremor+. Edema in LE extending to knees b/l (unable to observe due to wrapping).         TSH   Date Value Ref Range Status   08/02/2018 <0.01 (L) 0.40 - 4.00 mU/L Final   07/23/2018 <0.01 (L) 0.40 - 4.00 mU/L Final   07/17/2018 <0.01 (L) 0.40 - 4.00 mU/L Final   06/21/2018 <0.01 (L) 0.40 - 4.00 mU/L Final   06/11/2018 0.02 (L) 0.40 - 4.00 mU/L Final     T4 Free   Date Value Ref Range Status   08/02/2018 1.12 0.76 - 1.46 ng/dL Final   07/23/2018 2.08 (H) 0.76 - 1.46 ng/dL Final   07/17/2018 2.04 (H) 0.76 - 1.46 ng/dL Final   06/21/2018 1.93 (H) 0.76 - 1.46 ng/dL Final   06/11/2018 1.22 0.76 - 1.46 ng/dL Final     FT3 as follows:  8/02/18: 116  7/23/18: 182  7/17/18: 208  6/21/18: 212  6/11/18: 134  5/28/17: " 83    Assessment/Plan  1. Hyperthyroidism: Ms. Oviedo is doing better today. Her TSI and TSH/T4 suggest Grave's disease. Today, her TSH was <0.01 T4 1.24 and FT3 116. Her previous TSH, T4 and T3 were <0.01, 2.08 and 116.   -- She will reduce methimazole dose to 5 mg every day. Will recheck TFT early next week.   -- Will continue with methimazole for now, and redo thyroid USG in 6 months. Given cold nodule and increase in T4 after brief discontinuation of methimazole, LUNA does not seem appropriate and may exacerbate hyperthyroidism issues.   -- Discussed that some changes she is experiencing (hoarse throat, hair loss) will change or resolve with ongoing therapy.    2. Hematuria: Will follow-up with urology.     RTC 4 months with Dr. Meza.     Kindra INFANTE MS3, saw and examined this patient in the presence of Dr. Dhara Meza MD.

## 2018-08-15 NOTE — PROGRESS NOTES
"This 85-year-old woman was seen and examined by me with medical student Kindra Carter, who served as scribe.Please see student's note of the same date for full details.   Mrs. Oviedo was accompanied by her son and daughter-in-law.     In brief,  she first developed symptoms of hyperthyroidism in December.  She was most troubled by the tremor that occurred.  She had not noticed change in her skin or hair.  She saw Dr. Rojas in the community and he started her on methimazole.  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  Because she had received an iodine dye load when she had a coronary angiogram, we were unable to do a radioiodine scan and uptake to determine the precise cause of her hyperthyroidism.  She was maintained on methimazole until a few weeks ago when it was stopped.  The radioiodine uptake and scan was done and confirmed she had Graves' disease.  Because she developed hyperthyroidism with symptoms, she was put back on methimazole.  This dose was reduced from 5 mg 3 times daily to 5 mg twice daily 3 days ago.  With the addition of the methimazole, her symptoms of tremor and palpitations are essentially resolved..  She reports that her breathing is fine.  She does get tired when she walks a distance.  She has regular f/u with cardiology.      Current Outpatient Prescriptions on File Prior to Visit:  acetaminophen 650 MG TABS Take 650 mg by mouth every 8 hours as needed for mild pain or fever   alendronate (FOSAMAX) 70 MG tablet TAKE 1 TABLET EVERY 7 DAYS AT LEAST 60 MINUTES BEFORE BREAKFAST AS DIRECTED \"SEE PACKAGE FOR ADDITIONAL INSTRUCTIONS\"   ASPIRIN PO Take 81 mg by mouth At Bedtime   Calcium Citrate-Vitamin D (CALCIUM + D PO) Take by mouth 2 times daily   colestipol (COLESTID) 1 g tablet TK 1 T PO BID - TAKE OTHER MEDS 1 HOUR BEFORE OR 4 HOURS AFTER COLESID   CRANBERRY CONCENTRATE PO Take 2 capsules by mouth At Bedtime  "   cycloSPORINE (RESTASIS) 0.05 % ophthalmic emulsion Place 1 drop into both eyes 2 times daily   DiazePAM (VALIUM PO) Take 2 mg by mouth every 12 hours as needed for anxiety   Ferrous Sulfate (IRON SUPPLEMENT PO)    furosemide (LASIX) 20 MG tablet Take 1 tablet (20 mg) by mouth every morning   irbesartan (AVAPRO) 300 MG tablet TAKE 1 TABLET EVERY DAY (Patient taking differently: Take 300 mg by mouth every morning TAKE 1 TABLET EVERY DAY)   lovastatin (MEVACOR) 40 MG tablet TAKE 1 TABLET AT BEDTIME (HYPERLIPIDEMIA LDL GOAL BELOW 130)   Melatonin 10 MG TABS tablet Take 10 mg by mouth nightly as needed for sleep   methimazole (TAPAZOLE) 5 MG tablet Take 1 tablet (5 mg) by mouth daily (Patient taking differently: Take 5 mg by mouth 3 times daily )   nitroGLYcerin (NITROSTAT) 0.4 MG sublingual tablet For chest pain place 1 tablet under the tongue every 5 minutes for 3 doses. If symptoms persist 5 minutes after 1st dose call 911.   Omega-3 Fatty Acids (FISH OIL) 500 MG CAPS Take 1 capsule by mouth 2 times daily    omeprazole (PRILOSEC) 20 MG CR capsule Take 1 capsule (20 mg) by mouth daily (Patient taking differently: Take 20 mg by mouth five times a week )   order for DME Equipment being ordered: Knee high compression for B LE 20-30mmHg, Velcro units for night time (consider hybrid sock as foot swelling is less than leg swelling), Donning device   polyethylene glycol 0.4%- propylene glycol 0.3% (SYSTANE) 0.4-0.3 % SOLN ophthalmic solution Place 1 drop into both eyes 3 times daily as needed for dry eyes   Probiotic Product (PROBIOTIC ADVANCED PO)    propranolol (INDERAL LA) 60 MG 24 hr capsule Take 1 capsule (60 mg) by mouth daily   Psyllium (METAMUCIL FIBER PO) Take by mouth 3 times daily   rOPINIRole (REQUIP) 0.25 MG tablet TAKE 1 TABLET(0.25 MG) BY MOUTH EVERY NIGHT AS NEEDED   sertraline (ZOLOFT) 100 MG tablet Take 1 tablet (100 mg) by mouth daily (Patient taking differently: Take 100 mg by mouth every morning )  "  traZODone (DESYREL) 50 MG tablet TAKE 1 TABLET(50 MG) BY MOUTH EVERY NIGHT AS NEEDED FOR SLEEP     No current facility-administered medications on file prior to visit.     ROS: 10 point ROS neg other than the symptoms noted above in the HPI.    So Hx - now living in her own home    Vital signs:   /55  Pulse 60  Ht 1.448 m (4' 9.01\")  Wt 67.3 kg (148 lb 6.4 oz)  BMI 32.1 kg/m2  Estimated body mass index is 32.1 kg/(m^2) as calculated from the following:    Height as of this encounter: 1.448 m (4' 9.01\").    Weight as of this encounter: 67.3 kg (148 lb 6.4 oz).    VSS  NAD  Eyes - no periorbital edema, conjunctival injection, scleral icterus  Neck -her thyroid gland is 2-3 times greater than normal.  The right lobe is smooth and soft without distinct nodules.  The left lobe is also smooth and soft but has asymmetry in the lower lobe but feels like a nodule.  Lungs - clear  CV - RRR.  NormalS1, S2 w/o murmur or gallop  Neuro - .  DTR 3/4 biceps, no tremor  Skin - normal texture   Mood- even    Examination:  NM THYROID UPTAKE AND SCAN  Examination: Nuclear medicine thyroid uptake and scan, on 7/3/2018  11:27 AM.     Indication:  Would like to treat with radioactive iodine if  uptake/scan shows she has Graves or if uptake/scan shows she has hot  nodule; Hyperthyroidism     Additional Information: None.     Comparison: Ultrasound thyroid 12/18/2017.     Technique:     The patient received 255 uci of I-123 orally.  Uptake measurements and  scan was obtained at 24 hours.     Findings:     The laboratory assessment for hyperthyroidism determined increased  thyroid hormones.     The uptake at 24 hours was measured at 76.3%.  The normal range at 24  hours is from 10 to 30%.  Uptake on right: 39.3%,  Uptake on Left: 37%.     Total computer estimated weight of the gland: 62.7 gm,  Right gland  weight: 39.7 gm,  Left gland weight: 22.9 gm.     Images of the gland demonstrates diffusely increased uptake with " focal  area of photopenia in the right lobe superior laterally, corresponding  to a cold nodule.         Impression:     1. 76.3% uptake at 24 hours consistent with hyperthyroidism     2. Cold nodule in the right lobe of thyroid; remainder thyroid shows  increased uptake. Recommend tissue diagnosis           Findings in this case were discussed with Dr. Meza by Dr. Henry at  11:45 AM. 7/3/2018 11:58 AM     I have personally reviewed the examination and initial interpretation  and I agree with the findings.     KERRI AYALA MD           ENDO THYROID LABS-[facility] Latest Ref Rng & Units 8/2/2018 7/23/2018   TSH 0.40 - 4.00 mU/L <0.01 (L) <0.01 (L)   T4 FREE 0.76 - 1.46 ng/dL 1.12 2.08 (H)   FREE T3 2.3 - 4.2 pg/mL     TRIIODOTHYRONINE(T3) 60 - 181 ng/dL 116 182 (H)   THYROGLOBULIN ANTIBODY <40 IU/mL     THYR PEROXIDASE ZARA <35 IU/mL     THYROID STIM IMMUNOG <=1.3 TSI index       ENDO THYROID LABS-[facility] Latest Ref Rng & Units 7/17/2018 6/21/2018   TSH 0.40 - 4.00 mU/L <0.01 (L) <0.01 (L)   T4 FREE 0.76 - 1.46 ng/dL 2.04 (H) 1.93 (H)   FREE T3 2.3 - 4.2 pg/mL     TRIIODOTHYRONINE(T3) 60 - 181 ng/dL 208 (H) 212 (H)   THYROGLOBULIN ANTIBODY <40 IU/mL     THYR PEROXIDASE ZARA <35 IU/mL     THYROID STIM IMMUNOG <=1.3 TSI index       ENDO THYROID LABS-[facility] Latest Ref Rng & Units 6/11/2018 5/29/2018   TSH 0.40 - 4.00 mU/L 0.02 (L) 2.10   T4 FREE 0.76 - 1.46 ng/dL 1.22 0.72 (L)   FREE T3 2.3 - 4.2 pg/mL     TRIIODOTHYRONINE(T3) 60 - 181 ng/dL 134 83   THYROGLOBULIN ANTIBODY <40 IU/mL     THYR PEROXIDASE ZARA <35 IU/mL     THYROID STIM IMMUNOG <=1.3 TSI index  3.7 (H)     ENDO THYROID LABS-[facility] Latest Ref Rng & Units 3/29/2018 3/14/2018   TSH 0.40 - 4.00 mU/L 2.72 1.94   T4 FREE 0.76 - 1.46 ng/dL 0.69 (L)    FREE T3 2.3 - 4.2 pg/mL     TRIIODOTHYRONINE(T3) 60 - 181 ng/dL     THYROGLOBULIN ANTIBODY <40 IU/mL     THYR PEROXIDASE ZARA <35 IU/mL     THYROID STIM IMMUNOG <=1.3 TSI index       EXAMINATION: Thyroid ultrasound, 12/18/2017 1:49  PM      COMPARISON: Chest CT 12/13/2017, thyroid ultrasound 10/26/2006     HISTORY: The referring physician requested comparison to I123 scan -  to be done prior to this U/S Multinodular goiter; however the patient  deferred the nuclear medicine exam secondary to recent myocardial  infarction.     Technique: Grayscale and color ultrasound imaging of the thyroid was  performed.     Findings:    The thyroid is enlarged and diffusely heterogeneous with the  parenchyma composed of innumerable nodules, predominantly solid with  some cystic changes well. There are coarse, dystrophic appearing  calcifications on the left as well as punctate echogenic foci, many of  which demonstrate ring down/comet tail artifact, likely inspissated  colloid, with more pronounced associated cysts seen on prior  ultrasound of 10/2006 in the left lobe of the thyroid gland. There are  additional punctate echogenic foci in both lobes which may represent  microcalcifications. Multiple nodules demonstrate hypervascularity.     The right lobe of the thyroid measures: 4.4 x 3.3 x 5.1 cm.      The thyroid isthmus measures: 0.6 cm.     The left lobe of the thyroid measures: 2.4 x 3.3 x 5.3 cm.          Impression:  Enlarged heterogeneous thyroid gland composed of innumerable nodules,  predominantly solid with some cystic changes well. Coarse, dystrophic  appearing calcifications on the left as well as punctate echogenic  foci bilaterally, likely inspissated colloid with possible  microcalcifications. Comparison to I-123 scan was requested however  the patient declined that exam.     I have personally reviewed the examination and initial interpretation  and I agree with the findings.     KASANDRA WALKER MD           Assessment and plan:    Based on the recent radio iodine uptake and scan, it is clear that the cause of Mrs. Oviedo is hyperthyroidism is Graves' disease.  This was previously suggested by the borderline elevation in her TSI.  On the recent  thyroid scan, she was noted to have a nodule on the left that is cold.  Her ultrasound demonstrated multiple small nodules, none that are particularly concerning in appearance.  We elected to treat her Graves' disease with methimazole.  The reasoning behind this decision was that she was too symptomatic to risk having an increase in her thyroid hormone levels following radiation treatment.  While her cardiac disease is stable, we did not want to put her at risk for exacerbation.  My plan will be to continue treatment for 1-2 years and then determine if the hyperthyroidism persists.    Once she is euthyroid and has been there for several months, I will plan to repeat the ultrasound to evaluate the cold nodule identified on the left.    I recommended she decrease her dose of methimazole to 5 mg once a day.  I will repeat her thyroid tests next week to make further adjustments in doses.  She is scheduled to see me again in September.    She has no evidence of Graves' ophthalmopathy but I will continue to follow this carefully.    Dhara Meza MD

## 2018-08-16 ENCOUNTER — TELEPHONE (OUTPATIENT)
Dept: UROLOGY | Facility: CLINIC | Age: 83
End: 2018-08-16

## 2018-08-16 NOTE — TELEPHONE ENCOUNTER
[facility] Call Center    Phone Message    May a detailed message be left on voicemail: yes    Reason for Call: Other: Pts daughter in law Day calling asking for a call back to discuss pts cytology results from 7/26.     Action Taken: Message routed to:  [facility] ([facility]): UC URO AND PROSTATE

## 2018-08-17 ENCOUNTER — PRE VISIT (OUTPATIENT)
Dept: CARDIOLOGY | Facility: CLINIC | Age: 83
End: 2018-08-17

## 2018-08-17 ENCOUNTER — HOSPITAL ENCOUNTER (OUTPATIENT)
Dept: PHYSICAL THERAPY | Facility: CLINIC | Age: 83
Setting detail: THERAPIES SERIES
End: 2018-08-17
Attending: FAMILY MEDICINE
Payer: MEDICARE

## 2018-08-17 PROCEDURE — 97140 MANUAL THERAPY 1/> REGIONS: CPT | Mod: GP,ZF | Performed by: PHYSICAL THERAPIST

## 2018-08-17 PROCEDURE — 40000449 ZZHC STATISTIC PT VISIT, LYMPHEDEMA: Mod: ZF | Performed by: PHYSICAL THERAPIST

## 2018-08-17 PROCEDURE — 97110 THERAPEUTIC EXERCISES: CPT | Mod: GP,ZF | Performed by: PHYSICAL THERAPIST

## 2018-08-17 NOTE — TELEPHONE ENCOUNTER
HPI: Ms. Oviedo is a 85 year old female with a past medical history including HTN, multinodular goiter, GERD, hyperthyroidism, history of atrial fibrillation with RVR, and recent stress cardiomyopathy with recovered EF. Presents to clinic for CORE follow-up. She presented to [facility] 12/13 with persistent chest pain and shortness of breath, and was found to have an elevated troponin. EKG negative for ischemic changes. Chest CT negative for PE, but showed bilateral pleural effusions. Echo showed normal LV size with mild LVH, EF 30-35%, and global akinesis with sparing of the basal segments. Coronary angiogram revealed no obstructive CAD.  Findings all suggested stress-induced cardiomyopathy, so she was discharged on BB, ARB, statin, and aspirin. She was then admitted overnight on 1/16 for palpitations and was found to be in afib with RVR in the setting of volume overload and ongoing hyperthyroidism (though free t4 was normal that admission). She converted to sinus spontaneously. She was given a one-time dose of xarelto and developed significant hematuria so this was d/c'ed.  She is now s/p bladder mass bx which were negative for malignancy. An echo during that admission showed a normalized EF. In May she had a ziopatch that showed some SVTs. Most recently she presented to ED 7/16 with shortness of breath. She was treated with Zpack and albuterol for bronchitis.       PAST MEDICAL HISTORY:  Past Medical History:   Diagnosis Date     Calculus of kidney      Esophageal reflux      GERD (gastroesophageal reflux disease)      Hyperlipidemia LDL goal <130 5/9/2010     Malignant melanoma of skin of trunk, except scrotum (H)      Nonspecific abnormal finding     has living will 2004 -      Nontoxic multinodular goiter     no further eval /tx rec per pt     Osteopenia      Other psoriasis      Personal history of colonic polyps      PMR (polymyalgia rheumatica) (H)      Stress-induced cardiomyopathy      Undiagnosed cardiac  "murmurs      Unspecified constipation      Unspecified essential hypertension        CURRENT MEDICATIONS:  Current Outpatient Prescriptions   Medication Sig Dispense Refill     acetaminophen 650 MG TABS Take 650 mg by mouth every 8 hours as needed for mild pain or fever 100 tablet 0     alendronate (FOSAMAX) 70 MG tablet TAKE 1 TABLET EVERY 7 DAYS AT LEAST 60 MINUTES BEFORE BREAKFAST AS DIRECTED \"SEE PACKAGE FOR ADDITIONAL INSTRUCTIONS\" 12 tablet 0     ASPIRIN PO Take 81 mg by mouth At Bedtime       Calcium Citrate-Vitamin D (CALCIUM + D PO) Take by mouth 2 times daily       colestipol (COLESTID) 1 g tablet TK 1 T PO BID - TAKE OTHER MEDS 1 HOUR BEFORE OR 4 HOURS AFTER COLESID  2     CRANBERRY CONCENTRATE PO Take 2 capsules by mouth At Bedtime        cycloSPORINE (RESTASIS) 0.05 % ophthalmic emulsion Place 1 drop into both eyes 2 times daily       DiazePAM (VALIUM PO) Take 2 mg by mouth every 12 hours as needed for anxiety       Ferrous Sulfate (IRON SUPPLEMENT PO)        furosemide (LASIX) 20 MG tablet Take 1 tablet (20 mg) by mouth every morning       irbesartan (AVAPRO) 300 MG tablet TAKE 1 TABLET EVERY DAY (Patient taking differently: Take 300 mg by mouth every morning TAKE 1 TABLET EVERY DAY) 90 tablet 2     lovastatin (MEVACOR) 40 MG tablet TAKE 1 TABLET AT BEDTIME (HYPERLIPIDEMIA LDL GOAL BELOW 130) 90 tablet 2     Melatonin 10 MG TABS tablet Take 10 mg by mouth nightly as needed for sleep       methimazole (TAPAZOLE) 5 MG tablet Take 1 tablet (5 mg) by mouth daily (Patient taking differently: Take 5 mg by mouth 3 times daily ) 90 tablet 1     nitroGLYcerin (NITROSTAT) 0.4 MG sublingual tablet For chest pain place 1 tablet under the tongue every 5 minutes for 3 doses. If symptoms persist 5 minutes after 1st dose call 911. 25 tablet 3     Omega-3 Fatty Acids (FISH OIL) 500 MG CAPS Take 1 capsule by mouth 2 times daily        omeprazole (PRILOSEC) 20 MG CR capsule Take 1 capsule (20 mg) by mouth daily (Patient " taking differently: Take 20 mg by mouth five times a week ) 180 capsule 3     order for DME Equipment being ordered: Knee high compression for B LE 20-30mmHg, Velcro units for night time (consider hybrid sock as foot swelling is less than leg swelling), Donning device 1 each 2     polyethylene glycol 0.4%- propylene glycol 0.3% (SYSTANE) 0.4-0.3 % SOLN ophthalmic solution Place 1 drop into both eyes 3 times daily as needed for dry eyes       Probiotic Product (PROBIOTIC ADVANCED PO)        propranolol (INDERAL LA) 60 MG 24 hr capsule Take 1 capsule (60 mg) by mouth daily 90 capsule 1     Psyllium (METAMUCIL FIBER PO) Take by mouth 3 times daily       rOPINIRole (REQUIP) 0.25 MG tablet TAKE 1 TABLET(0.25 MG) BY MOUTH EVERY NIGHT AS NEEDED 90 tablet 0     sertraline (ZOLOFT) 100 MG tablet Take 1 tablet (100 mg) by mouth daily (Patient taking differently: Take 100 mg by mouth every morning ) 90 tablet 1     traZODone (DESYREL) 50 MG tablet TAKE 1 TABLET(50 MG) BY MOUTH EVERY NIGHT AS NEEDED FOR SLEEP 90 tablet 1       PAST SURGICAL HISTORY:  Past Surgical History:   Procedure Laterality Date     BIOPSY       C NONSPECIFIC PROCEDURE  2005    colonoscopy polyp repeat 2010     COLONOSCOPY  2014     COMBINED CYSTOSCOPY, RETROGRADES, URETEROSCOPY, INSERT STENT Bilateral 4/3/2018    Procedure: COMBINED CYSTOSCOPY, RETROGRADES, URETEROSCOPY, INSERT STENT;;  Surgeon: Stuart King MD;  Location: [facility] OR     CYSTOSCOPY, BIOPSY BLADDER INSTILL OPTICAL AGENT N/A 4/3/2018    Procedure: CYSTOSCOPY, BIOPSY BLADDER INSTILL OPTICAL AGENT;  Cystoscopy, Blue Light Cystoscopy, Bladder Biopsies, Bilateral Selective ureteral washings for Cytology, Bilateral Retrograde Pyelograms, Bilateral Ureteroscopy;  Surgeon: Stuart King MD;  Location: [facility] OR     CYSTOSCOPY, BIOPSY BLADDER, COMBINED N/A 2/19/2018    Procedure: COMBINED CYSTOSCOPY, BIOPSY BLADDER;  Cystoscopy, Bladder Biopsy;  Surgeon: Kenna La MD;   Location: [facility] OR     ENDOSCOPIC ULTRASOUND LOWER GASTROINTESTIONAL TRACT (GI) N/A 10/30/2015    Procedure: ENDOSCOPIC ULTRASOUND LOWER GASTROINTESTIONAL TRACT (GI);  Surgeon: Daniel Jean-Baptiste MD;  Location: [facility] OR     EYE SURGERY  12/4/17     LAPAROSCOPIC CHOLECYSTECTOMY WITH CHOLANGIOGRAMS N/A 11/1/2015    Procedure: LAPAROSCOPIC CHOLECYSTECTOMY WITH CHOLANGIOGRAMS;  Surgeon: Tonie Warren MD;  Location: [facility] OR     SURGICAL HISTORY OF -   1996    malignant melanoma     SURGICAL HISTORY OF -   1968    thyroid nodule     SURGICAL HISTORY OF -       D & C       ALLERGIES:     Allergies   Allergen Reactions     No Known Drug Allergies        FAMILY HISTORY:  Family History   Problem Relation Age of Onset     Cancer Father      dec - esophageal and laryngeal     HEART DISEASE Mother      Respiratory Mother      dec     Breast Cancer Daughter      Other Cancer Daughter      Thyroid Disease Daughter      Asthma Daughter      Hyperlipidemia Son      Diabetes Son          SOCIAL HISTORY:  Social History   Substance Use Topics     Smoking status: Never Smoker     Smokeless tobacco: Never Used     Alcohol use No      Comment: 6 times per year-one drink       ROS:   Constitutional: No fever, chills, or sweats. Weight stable.   ENT: No visual disturbance, ear ache, epistaxis, sore throat.   Cardiovascular: As per HPI.   Respiratory: No cough, hemoptysis.    GI: No nausea, vomiting, hematemesis, melena, or hematochezia.   : No hematuria.   Integument: Negative.   Psychiatric: Negative.   Hematologic:  Easy bruising, no easy bleeding.  Neuro: Negative.   Endocrinology: No significant heat or cold intolerance   Musculoskeletal: No myalgia.    Exam:  There were no vitals taken for this visit.  GENERAL APPEARANCE: healthy, alert and no distress  HEENT: no icterus, no xanthelasmas, normal pupil size and reaction, normal palate, mucosa moist, no central cyanosis  NECK: no adenopathy, no asymmetry, masses, or scars,  thyroid normal to palpation and no bruits, JVP not elevated  RESPIRATORY: lungs clear to auscultation - no rales, rhonchi or wheezes, no use of accessory muscles, no retractions, respirations are unlabored, normal respiratory rate  CARDIOVASCULAR: regular rhythm, normal S1 with physiologic split S2, no S3 or S4 and no murmur, click or rub, precordium quiet with normal PMI.  ABDOMEN: soft, non tender, without hepatosplenomegaly, no masses palpable, bowel sounds normal, aorta not enlarged by palpation, no abdominal bruits  EXTREMITIES: peripheral pulses normal, no edema, no bruits  NEURO: alert and oriented to person/place/time, normal speech, gait and affect  VASC: Radial, femoral, dorsalis pedis and posterior tibialis pulses are normal in volumes and symmetric bilaterally. No bruits are heard.  SKIN: no ecchymoses, no rashes    Labs:  CBC RESULTS:   Lab Results   Component Value Date    WBC 5.9 2018    RBC 3.68 (L) 2018    HGB 10.4 (L) 2018    HCT 32.7 (L) 2018    MCV 89 2018    MCH 28.3 2018    MCHC 31.8 2018    RDW 13.0 2018     2018       BMP RESULTS:  Lab Results   Component Value Date     2018    POTASSIUM 5.1 2018    CHLORIDE 104 2018    CO2 28 2018    ANIONGAP 7 2018     (H) 2018    BUN 18 2018    CR 0.71 2018    GFRESTIMATED 78 2018    GFRESTBLACK >90 2018    SHREYA 9.5 2018        INR RESULTS:  Lab Results   Component Value Date    INR 1.71 (H) 2018    INR 1.02 2017    INR 0.95 2015    INR 1.00 10/30/2015       Procedures:      ECHOCARDIOGRAM:   Recent Results (from the past 8760 hour(s))   Echocardiogram Complete    Narrative    [id number]  [id number]  [id number]  [id number]^THOMAS^JORDON^CELIA           [facility],[place]  Echocardiography Laboratory  [address] [place] [id number]     Name: QUIN VALDEZ  MRN: [id number]  :  06/23/1933  Study Date: 07/23/2018 09:54 AM  Age: 85 yrs  Gender: Female  Patient Location: [facility]  Reason For Study: , HFrEF (heart failure with reduced ejection fraction) (H)  Ordering Physician: JORDON GUZMAN  Referring Physician: JORDON GUZMAN  Performed By: Christoph Aden CECILIA     BSA: 1.6 m2  Height: 57 in  Weight: 148 lb  BP: 125/50 mmHg  _____________________________________________________________________________  __        Procedure  Echocardiogram with two-dimensional, color and spectral Doppler performed.  _____________________________________________________________________________  __        Interpretation Summary  Limited evaluation given suboptimal image quality.     The left ventricular ejection fraction is visually estimated at 55-60 %. No  regional wall motion abnormalities are seen.  Global right ventricular function is normal.  There is severe left atrial enlargement.  Valves are not well seen, though there appears to be mild aortic  insufficiency.  No pericardial effusion is present.  This study was compared with the study from 3/14/18 . There has been no  change.  _____________________________________________________________________________  __        Left Ventricle  Left ventricular wall thickness cannot evaluate. Left ventricular size cannot  evaluate. The Ejection Fraction was visually estimated. The Ejection Fraction  is estimated at 55-60%. No regional wall motion abnormalities are seen.     Right Ventricle  Global right ventricular function is normal. RV size cannot be assessed.     Atria  Right atrium cannot be assessed. Severe left atrial enlargement is present.     Mitral Valve  The mitral valve cannot be assessed.        Aortic Valve  The aortic valve cannot be assessed. Mild aortic insufficiency is present.     Tricuspid Valve  The tricuspid valve cannot be assessed. Pulmonary artery systolic pressure  cannot be assessed.     Pulmonic Valve  The pulmonic valve cannot be  assessed.     Vessels  The inferior vena cava was normal in size with preserved respiratory  variability. The aorta root cannot be assessed. Estimated mean right atrial  pressure is 3 mmHg (normal).     Pericardium  No pericardial effusion is present.        Compared to Previous Study  This study was compared with the study from 3/14/18 . There has been no  change.     Attestation  I have personally viewed the imaging and agree with the interpretation and  report as documented by the fellow, Aman Comer, and/or edited by me.  _____________________________________________________________________________  __     MMode/2D Measurements & Calculations  LA Volume (BP): 109.0 ml  LA Volume Index (BP): 69.0 ml/m2        Doppler Measurements & Calculations  MV E max tati: 118.0 cm/sec  MV A max tati: 90.3 cm/sec  MV E/A: 1.3  MV dec time: 0.26 sec  Lateral E/e': 15.4        _____________________________________________________________________________  __           Report approved by: Ade Razo 2018 11:37 AM      ECHO LIMITED WITH OPTISON    Narrative    [id number]  ECH74  [id number]  [id number]^FERNANDO^KELLY^R           [facility],[place]  Echocardiography Laboratory  [address] [place] [id number]     Name: QUIN VALDEZ  MRN: [id number]  : 1933  Study Date: 2018 01:41 PM  Age: 84 yrs  Gender: Female  Patient Location: [facility]  Reason For Study: Chest Pressure  Ordering Physician: KELLY KING  Performed By: Kade Wolfe RDCS     BSA: 1.6 m2  Height: 57 in  Weight: 150 lb  _____________________________________________________________________________  __        Procedure  Limited Portable Echo Adult. Contrast Optison. Optison (NDC #6646-4841-28)  given intravenously. Patient was given 6 ml mixture of 3 ml Optison and 6 ml  saline. 3 ml wasted.  _____________________________________________________________________________  __        Interpretation Summary  Global and  regional left ventricular function is normal with an EF of 60-65%.  No regional wall motion abnormalities are seen.  Right ventricular function, chamber size, wall motion, and thickness are  normal.  No pericardial effusion is present.  _____________________________________________________________________________  __        Left Ventricle  Global and regional left ventricular function is normal with an EF of 60-65%.  No regional wall motion abnormalities are seen.     Right Ventricle  Right ventricular function, chamber size, wall motion, and thickness are  normal.     Pericardium  No pericardial effusion is present.     _____________________________________________________________________________  __                       Report approved by: Ade Razo 2018 02:12 PM                 _____________________________________________________________________________  __      ECHO LIMITED WITH OPTISON    Narrative    [id number]  ECH74  [id number]  [id number]^JONATHAN^ROLANDA^           [facility],[place]  Echocardiography Laboratory  [address] [place] [id number]     Name: QUIN VALDEZ  MRN: [id number]  : 1933  Study Date: 2018 11:12 AM  Age: 84 yrs  Gender: Female  Patient Location: [facility]  Reason For Study: Afib  Ordering Physician: ROLANDA CASTILLO  Performed By: Yasmany Esteves RDCS     BSA: 1.7 m2  Height: 57 in  Weight: 176 lb  HR: 60  BP: 135/60 mmHg  _____________________________________________________________________________  __        Procedure  Limited Portable Echo Adult. Contrast Optison. Optison (NDC #3992-2324-17)  given intravenously. Patient was given 4 ml mixture of 3 ml Optison and 6 ml  saline. 5 ml wasted.  _____________________________________________________________________________  __        Interpretation Summary  Left ventricular function is normal.The EF is > 65%.  The inferior vena cava was normal in size with preserved  respiratory  variability.  No pericardial effusion is present.  The left ventricular function has improved.  _____________________________________________________________________________  __        Left Ventricle  Left ventricular function is normal.The EF is > 65%. Left ventricular wall  thickness is normal. Left ventricular size is normal. Diastolic function not  assessed due to atrial fibrillation.     Right Ventricle  The right ventricle cannot be assessed.     Atria  The atria cannot be assessed.     Mitral Valve  The mitral valve cannot be assessed. Mild mitral annular calcification is  present.        Aortic Valve  The aortic valve cannot be assessed.     Tricuspid Valve  The tricuspid valve cannot be assessed.     Pulmonic Valve  The pulmonic valve cannot be assessed.     Vessels  The aorta root cannot be assessed. The thoracic aorta cannot be assessed. The  pulmonary artery cannot be assessed. The inferior vena cava was normal in size  with preserved respiratory variability.     Pericardium  No pericardial effusion is present.        Compared to Previous Study  The left ventricular function has improved.     Attestation  I have personally viewed the imaging and agree with the interpretation and  report as documented by the fellow, bernardino rios, and/or edited by me.  _____________________________________________________________________________  __     MMode/2D Measurements & Calculations  IVSd: 1.0 cm  LVIDd: 4.6 cm  LVIDs: 3.2 cm  LVPWd: 0.91 cm  FS: 29.8 %  EDV(Teich): 96.8 ml  ESV(Teich): 41.6 ml  LV mass(C)d: 154.2 grams  LV mass(C)dI: 90.5 grams/m2  RWT: 0.40           _____________________________________________________________________________  __           Report approved by: Ade Razo 01/17/2018 12:39 PM      ECHO COMPLETE WITH OPTISON    Narrative    [id number]  [id number]  [id number]  [id number]^RETA^RICK^LISANDRA           [facility],[place]  Echocardiography  Laboratory  [address] [place] [address]     Name: QUIN VALDEZ  MRN: [id number]  : 1933  Study Date: 2017 09:39 AM  Age: 84 yrs  Gender: Female  Patient Location: [facility]  Reason For Study: Dyspnea  Ordering Physician: RICK MCDUFFIE  Performed By: CECILIA See     BSA: 1.7 m2  Height: 58 in  Weight: 176 lb  BP: 131/85 mmHg  _____________________________________________________________________________  __        Procedure  Echocardiogram with two-dimensional, color and spectral Doppler performed.  Contrast Optison. Optison (NDC #0604-6929-11) given intravenously. Patient was  given 6.0 ml mixture of 3 ml Optison and 6 ml saline. 3.0 ml wasted.  _____________________________________________________________________________  __        Interpretation Summary  Technically difficult study due to poor acoustic windows.     Normal left ventricular size with mild concentric hypertrophy.  Global akinesis with sparing of the basal segments consistent with stress  cardiomyopathy versus multivessel coronary artery disease. Moderately reduced  systolic function. EF 30-35%.  Normal right ventricular size and function.  Severe left atrial enlargement.  No pericardial effusion.     Compared to the prior study 10/31/15 the wall motion abnormalities and LV  dysfunction are new.  _____________________________________________________________________________  __        Left Ventricle  Left ventricular size is normal. Mild concentric wall thickening consistent  with left ventricular hypertrophy is present. Moderately (EF 30-35%) reduced  left ventricular function is present. Global akinesis with sparing of the  basal segments consistent with stress cardiomyopathy versus multivessel  coronary artery disease.     Right Ventricle  The right ventricle is normal size. Global right ventricular function is  normal.     Atria  The right atria appears normal. Severe left atrial enlargement is present.         Mitral Valve  The mitral valve is normal. Trace mitral insufficiency is present.     Aortic Valve  Moderate aortic valve calcification. Mild aortic insufficiency is present.  Mild aortic stenosis is present.     Tricuspid Valve  The tricuspid valve is normal. Trace tricuspid insufficiency is present. The  peak velocity of the tricuspid regurgitant jet is not obtainable.     Pulmonic Valve  The pulmonic valve is normal. Trace pulmonic insufficiency is present.     Vessels  The aorta root is normal. The inferior vena cava was normal in size with  preserved respiratory variability. Estimated mean right atrial pressure is 3  mmHg.     Pericardium  No pericardial effusion is present.        Attestation  I have personally viewed the imaging and agree with the interpretation and  report as documented by the fellow, Chencho Chanel, and/or edited by me.  _____________________________________________________________________________  __     MMode/2D Measurements & Calculations  IVSd: 1.3 cm  LVIDd: 4.7 cm  LVIDs: 3.4 cm  LVPWd: 1.2 cm  FS: 27.4 %  EDV(Teich): 101.3 ml  ESV(Teich): 47.4 ml  LV mass(C)d: 219.4 grams  LV mass(C)dI: 127.2 grams/m2  Ao root diam: 2.5 cm  LVOT diam: 2.2 cm  LVOT area: 3.8 cm2     EF(MOD-bp): 31.6 %  LA Volume (BP): 88.7 ml  LA Volume Index (BP): 51.6 ml/m2     RWT: 0.51        Doppler Measurements & Calculations  MV E max tati: 127.8 cm/sec  Ao V2 max: 212.3 cm/sec  Ao max P.0 mmHg  Ao V2 mean: 147.3 cm/sec  Ao mean PG: 10.0 mmHg  Ao V2 VTI: 40.1 cm  PAUL(I,D): 1.7 cm2  PAUL(V,D): 1.7 cm2  LV V1 max PG: 3.6 mmHg  LV V1 max: 95.3 cm/sec  LV V1 VTI: 18.2 cm  SV(LVOT): 69.2 ml  SI(LVOT): 40.1 ml/m2  PAUL Index (cm2/m2): 1.0  E/E' av.7  Lateral E/e': 25.6  Medial E/e': 23.8        _____________________________________________________________________________  __        Report approved by: Ade Razo 2017 10:42 AM            Assessment and Plan:             I very much appreciated the  opportunity to see and assess this patient in the clinic with Cardiovascular Fellow        I agree with the above note which summarizes my findings and current recommendations.      Please do not hesitate to contact my office if you have any questions or concerns.       Butch Griffith MD  Cardiac Arrhythmia Service  [facility]  [phone]          CC

## 2018-08-20 ENCOUNTER — TELEPHONE (OUTPATIENT)
Dept: ENDOCRINOLOGY | Facility: CLINIC | Age: 83
End: 2018-08-20

## 2018-08-20 ENCOUNTER — HOSPITAL ENCOUNTER (OUTPATIENT)
Dept: PHYSICAL THERAPY | Facility: CLINIC | Age: 83
Setting detail: THERAPIES SERIES
End: 2018-08-20
Attending: FAMILY MEDICINE
Payer: MEDICARE

## 2018-08-20 DIAGNOSIS — E05.90 HYPERTHYROIDISM: Primary | ICD-10-CM

## 2018-08-20 DIAGNOSIS — E05.00 GRAVES DISEASE: ICD-10-CM

## 2018-08-20 LAB
T3 SERPL-MCNC: 130 NG/DL (ref 60–181)
T4 FREE SERPL-MCNC: 0.8 NG/DL (ref 0.76–1.46)
TSH SERPL DL<=0.005 MIU/L-ACNC: <0.01 MU/L (ref 0.4–4)

## 2018-08-20 PROCEDURE — 97535 SELF CARE MNGMENT TRAINING: CPT | Mod: GP,ZF | Performed by: PHYSICAL THERAPIST

## 2018-08-20 PROCEDURE — 97140 MANUAL THERAPY 1/> REGIONS: CPT | Mod: GP,ZF | Performed by: PHYSICAL THERAPIST

## 2018-08-20 PROCEDURE — 40000449 ZZHC STATISTIC PT VISIT, LYMPHEDEMA: Mod: ZF | Performed by: PHYSICAL THERAPIST

## 2018-08-20 PROCEDURE — 84480 ASSAY TRIIODOTHYRONINE (T3): CPT | Performed by: INTERNAL MEDICINE

## 2018-08-20 NOTE — TELEPHONE ENCOUNTER
Called her about TFTS done today.  They are in line with what I would expect from the dosage changes we have made.  She continues to feel better with no tremor.  Still having hematuria.    Asked her to continue methimazole 5 mg a day.  Repeat thyroid tests in two week.    She should call urology and let them know her hematuria is continuing.  She will do so.    Dhara Meza MD

## 2018-08-22 ENCOUNTER — HOSPITAL ENCOUNTER (OUTPATIENT)
Dept: PHYSICAL THERAPY | Facility: CLINIC | Age: 83
Setting detail: THERAPIES SERIES
End: 2018-08-22
Attending: FAMILY MEDICINE
Payer: MEDICARE

## 2018-08-22 PROCEDURE — 97535 SELF CARE MNGMENT TRAINING: CPT | Mod: GP,ZF | Performed by: PHYSICAL THERAPIST

## 2018-08-22 PROCEDURE — 40000449 ZZHC STATISTIC PT VISIT, LYMPHEDEMA: Mod: ZF | Performed by: PHYSICAL THERAPIST

## 2018-08-22 PROCEDURE — 97110 THERAPEUTIC EXERCISES: CPT | Mod: GP,ZF | Performed by: PHYSICAL THERAPIST

## 2018-08-22 PROCEDURE — 97140 MANUAL THERAPY 1/> REGIONS: CPT | Mod: GP,ZF | Performed by: PHYSICAL THERAPIST

## 2018-08-23 ENCOUNTER — TELEPHONE (OUTPATIENT)
Dept: UROLOGY | Facility: CLINIC | Age: 83
End: 2018-08-23

## 2018-08-23 DIAGNOSIS — C68.9 UROTHELIAL CANCER (H): Primary | ICD-10-CM

## 2018-08-23 RX ORDER — CEFAZOLIN SODIUM 1 G/50ML
1 INJECTION, SOLUTION INTRAVENOUS SEE ADMIN INSTRUCTIONS
Status: CANCELLED | OUTPATIENT
Start: 2018-08-23 | End: 2019-08-23

## 2018-08-23 NOTE — TELEPHONE ENCOUNTER
Patient is scheduled for surgery with Dr. King      Spoke or left message with: I spoke to Day per patients request    Date of Surgery: 09/10/18    Location [facility] OR     H&P: Scheduled with PAC 08/27/18    Post op: NA    Additional imaging/appointments: NA    Surgery packet sent: FED-X surgery packet today     Additional comments:  The patient is aware they need a pre-op at least a couple of weeks before surgery date. We went over the patient needing a  and someone to stay with them for 24 hours after the surgery. The patient was given my direct number for any questions [phone]

## 2018-08-24 ENCOUNTER — HOSPITAL ENCOUNTER (OUTPATIENT)
Dept: PHYSICAL THERAPY | Facility: CLINIC | Age: 83
Setting detail: THERAPIES SERIES
End: 2018-08-24
Attending: FAMILY MEDICINE
Payer: MEDICARE

## 2018-08-24 PROCEDURE — 97140 MANUAL THERAPY 1/> REGIONS: CPT | Mod: GP,ZF | Performed by: PHYSICAL THERAPIST

## 2018-08-24 PROCEDURE — G8988 SELF CARE GOAL STATUS: HCPCS | Mod: GP,CI,ZF | Performed by: PHYSICAL THERAPIST

## 2018-08-24 PROCEDURE — 97535 SELF CARE MNGMENT TRAINING: CPT | Mod: GP,ZF | Performed by: PHYSICAL THERAPIST

## 2018-08-24 PROCEDURE — 40000449 ZZHC STATISTIC PT VISIT, LYMPHEDEMA: Mod: ZF | Performed by: PHYSICAL THERAPIST

## 2018-08-24 PROCEDURE — 40000174: Mod: ZF | Performed by: PHYSICAL THERAPIST

## 2018-08-24 PROCEDURE — G8987 SELF CARE CURRENT STATUS: HCPCS | Mod: GP,CI,ZF | Performed by: PHYSICAL THERAPIST

## 2018-08-24 NOTE — ADDENDUM NOTE
Encounter addended by: Crista Jain, PT on: 8/24/2018  4:05 PM<BR>     Actions taken: Flowsheet accepted, Charge Capture section accepted, Sign clinical note

## 2018-08-24 NOTE — PROGRESS NOTES
Outpatient Physical Therapy Progress Note     Patient: Dimple Oviedo  : 1933    Beginning/End Dates of Reporting Period:  18 to 2018    Referring Provider: Dr. Bel Mckeon    Therapy Diagnosis: Venous stasis, CHF and lymphedema     Client Self Report:      Objective Measurements:  Objective Measure: volume  Details: pt had small B decrease in lower leg volume  Objective Measure: skin  Details: Skin in good condition, Pt denies tenderness during MLD  Objective Measure: Pain  Details: legs less painful unless touched very firmly                        Outcome Measures (most recent score):  Lymphedema Life Impact Scale (score range 0-72). A higher score indicates greater impairment.: 18    Goals:  Goal Identifier Home program   Goal Description pt will be independent with home program to manage her B LE swelling   Target Date 10/06/18   Date Met      Progress: on-going, good progress     Goal Identifier Cirucumference   Goal Description Pt will have 2cm decrease in measurement of B LE at 10cm above the heel for decreased risk of infection   Target Date 10/06/18   Date Met  18   Progress:     Goal Identifier LLIS   Goal Description Pt will score 19 or less on LLIS for MICD in impact of swelling on quality of life.     Target Date 10/06/18   Date Met   18   Progress:     Goal Identifier Compression   Goal Description Pt will be independent in donnind and doffing her compression garments to prevent exacerbation of swelling   Target Date 10/06/18   Date Met      Progress:         Progress Toward Goals:   Progress this reporting period: Dimple has learned how to do compression bandaging and has been compliant with all aspects of program. She is now wearing compression stockings and will be seen in 1 month to assess effectiveness of home program      Plan:  Continue therapy per current plan of care.    Discharge:  No

## 2018-08-27 ENCOUNTER — APPOINTMENT (OUTPATIENT)
Dept: SURGERY | Facility: CLINIC | Age: 83
End: 2018-08-27
Payer: MEDICARE

## 2018-08-27 ENCOUNTER — ANESTHESIA EVENT (OUTPATIENT)
Dept: SURGERY | Facility: AMBULATORY SURGERY CENTER | Age: 83
End: 2018-08-27

## 2018-08-27 ENCOUNTER — OFFICE VISIT (OUTPATIENT)
Dept: SURGERY | Facility: CLINIC | Age: 83
End: 2018-08-27
Payer: MEDICARE

## 2018-08-27 VITALS
RESPIRATION RATE: 16 BRPM | SYSTOLIC BLOOD PRESSURE: 137 MMHG | HEART RATE: 70 BPM | DIASTOLIC BLOOD PRESSURE: 70 MMHG | WEIGHT: 147.1 LBS | TEMPERATURE: 97.9 F | OXYGEN SATURATION: 95 % | BODY MASS INDEX: 31.73 KG/M2 | HEIGHT: 57 IN

## 2018-08-27 DIAGNOSIS — R31.0 GROSS HEMATURIA: ICD-10-CM

## 2018-08-27 DIAGNOSIS — R09.89 RIGHT CAROTID BRUIT: ICD-10-CM

## 2018-08-27 DIAGNOSIS — Z01.818 PRE-OP EXAMINATION: Primary | ICD-10-CM

## 2018-08-27 DIAGNOSIS — E05.90 HYPERTHYROIDISM: ICD-10-CM

## 2018-08-27 LAB
ALBUMIN UR-MCNC: NEGATIVE MG/DL
ANION GAP SERPL CALCULATED.3IONS-SCNC: 3 MMOL/L (ref 3–14)
APPEARANCE UR: CLEAR
BILIRUB UR QL STRIP: NEGATIVE
BUN SERPL-MCNC: 16 MG/DL (ref 7–30)
CALCIUM SERPL-MCNC: 9.3 MG/DL (ref 8.5–10.1)
CHLORIDE SERPL-SCNC: 102 MMOL/L (ref 94–109)
CO2 SERPL-SCNC: 32 MMOL/L (ref 20–32)
COLOR UR AUTO: YELLOW
CREAT SERPL-MCNC: 0.75 MG/DL (ref 0.52–1.04)
ERYTHROCYTE [DISTWIDTH] IN BLOOD BY AUTOMATED COUNT: 13.7 % (ref 10–15)
GFR SERPL CREATININE-BSD FRML MDRD: 73 ML/MIN/1.7M2
GLUCOSE SERPL-MCNC: 97 MG/DL (ref 70–99)
GLUCOSE UR STRIP-MCNC: NEGATIVE MG/DL
HCT VFR BLD AUTO: 37.2 % (ref 35–47)
HGB BLD-MCNC: 11.5 G/DL (ref 11.7–15.7)
HGB UR QL STRIP: ABNORMAL
KETONES UR STRIP-MCNC: NEGATIVE MG/DL
LEUKOCYTE ESTERASE UR QL STRIP: ABNORMAL
MCH RBC QN AUTO: 27.6 PG (ref 26.5–33)
MCHC RBC AUTO-ENTMCNC: 30.9 G/DL (ref 31.5–36.5)
MCV RBC AUTO: 89 FL (ref 78–100)
NITRATE UR QL: NEGATIVE
PH UR STRIP: 6 PH (ref 5–7)
PLATELET # BLD AUTO: 317 10E9/L (ref 150–450)
POTASSIUM SERPL-SCNC: 5.1 MMOL/L (ref 3.4–5.3)
RBC # BLD AUTO: 4.17 10E12/L (ref 3.8–5.2)
RBC #/AREA URNS AUTO: 74 /HPF (ref 0–2)
SODIUM SERPL-SCNC: 136 MMOL/L (ref 133–144)
SOURCE: ABNORMAL
SP GR UR STRIP: 1.01 (ref 1–1.03)
T3 SERPL-MCNC: 153 NG/DL (ref 60–181)
T4 FREE SERPL-MCNC: 1.11 NG/DL (ref 0.76–1.46)
TSH SERPL DL<=0.005 MIU/L-ACNC: <0.01 MU/L (ref 0.4–4)
UROBILINOGEN UR STRIP-MCNC: 0 MG/DL (ref 0–2)
WBC # BLD AUTO: 6 10E9/L (ref 4–11)
WBC #/AREA URNS AUTO: 24 /HPF (ref 0–5)
WBC CLUMPS #/AREA URNS HPF: PRESENT /HPF

## 2018-08-27 PROCEDURE — 87086 URINE CULTURE/COLONY COUNT: CPT | Performed by: NURSE PRACTITIONER

## 2018-08-27 PROCEDURE — 84480 ASSAY TRIIODOTHYRONINE (T3): CPT | Performed by: NURSE PRACTITIONER

## 2018-08-27 ASSESSMENT — LIFESTYLE VARIABLES: TOBACCO_USE: 0

## 2018-08-27 ASSESSMENT — ENCOUNTER SYMPTOMS: DYSRHYTHMIAS: 1

## 2018-08-27 NOTE — H&P
Pre-Operative H & P     CC:  Preoperative exam to assess for increased cardiopulmonary risk while undergoing surgery and anesthesia.    Date of Encounter: 8/27/2018  Primary Care Physician:  Pop Zapien  Reason for visit:   Gross hematuria      HPI  Dimple Oviedo is a 85 year old female who presents for pre-operative H & P in preparation for Cystoscopy, Bilateral Ureteroscopy, Random Bladder Biopsies, Retrogram Pyelograms, Ureteral Washings on 9/10/18 with Dr. King at [facility] under general anesthesia.  Ms. Oviedo has been followed by Dr. King for a history of positive FISH and atypical cytology.  She last saw him on 7/26/18 for follow up cystoscopy given ongoing hematuria.  The above procedure was recommended.     Ms. Oviedo presents to PAC with her daughter-in-law.  She denies any chest pain, SOB, palpitations, syncope, HERMAN, orthopnea, or PND.   She does suffer from lymphedema.  She has a h/o stress cardiomyopathy (12/2017) with recovered EF and history of atrial fibrillation with rapid ventricular response (1/2018).  She underwent Holter monitoring this past July that was within normal limits.  She continues to have intermittent hematuria.  She would like to proceed with above surgical intervention.       History is obtained from the patient and electronic health record.     Past Medical History  Past Medical History:   Diagnosis Date     Calculus of kidney      Esophageal reflux      GERD (gastroesophageal reflux disease)      Hyperlipidemia LDL goal <130 5/9/2010     Malignant melanoma of skin of trunk, except scrotum (H)      Nonspecific abnormal finding     has living will 2004 -      Nontoxic multinodular goiter     no further eval /tx rec per pt     Osteopenia      Other psoriasis      Personal history of colonic polyps      PMR (polymyalgia rheumatica) (H)      Stress-induced cardiomyopathy      Undiagnosed cardiac murmurs      Unspecified constipation       Unspecified essential hypertension        Past Surgical History  Past Surgical History:   Procedure Laterality Date     BIOPSY       C NONSPECIFIC PROCEDURE  2005    colonoscopy polyp repeat 2010     COLONOSCOPY  2014     COMBINED CYSTOSCOPY, RETROGRADES, URETEROSCOPY, INSERT STENT Bilateral 4/3/2018    Procedure: COMBINED CYSTOSCOPY, RETROGRADES, URETEROSCOPY, INSERT STENT;;  Surgeon: Stuart King MD;  Location: [facility] OR     CYSTOSCOPY, BIOPSY BLADDER INSTILL OPTICAL AGENT N/A 4/3/2018    Procedure: CYSTOSCOPY, BIOPSY BLADDER INSTILL OPTICAL AGENT;  Cystoscopy, Blue Light Cystoscopy, Bladder Biopsies, Bilateral Selective ureteral washings for Cytology, Bilateral Retrograde Pyelograms, Bilateral Ureteroscopy;  Surgeon: Stuart King MD;  Location: [facility] OR     CYSTOSCOPY, BIOPSY BLADDER, COMBINED N/A 2/19/2018    Procedure: COMBINED CYSTOSCOPY, BIOPSY BLADDER;  Cystoscopy, Bladder Biopsy;  Surgeon: Kenna La MD;  Location: [facility] OR     ENDOSCOPIC ULTRASOUND LOWER GASTROINTESTIONAL TRACT (GI) N/A 10/30/2015    Procedure: ENDOSCOPIC ULTRASOUND LOWER GASTROINTESTIONAL TRACT (GI);  Surgeon: Daniel Jean-Baptiste MD;  Location: [facility] OR     EYE SURGERY  12/4/17     LAPAROSCOPIC CHOLECYSTECTOMY WITH CHOLANGIOGRAMS N/A 11/1/2015    Procedure: LAPAROSCOPIC CHOLECYSTECTOMY WITH CHOLANGIOGRAMS;  Surgeon: Tonie Warren MD;  Location: [facility] OR     SURGICAL HISTORY OF -   1996    malignant melanoma     SURGICAL HISTORY OF -   1968    thyroid nodule     SURGICAL HISTORY OF -       D & C       Hx of Blood transfusions/reactions: denies     Hx of abnormal bleeding or anti-platelet use: ASA - on hold    Menstrual history: No LMP recorded. Patient is postmenopausal.:     Steroid use in the last year: denies    Personal or FH with difficulty with Anesthesia:  PONV    Prior to Admission Medications  Current Outpatient Prescriptions   Medication Sig Dispense Refill     Acetaminophen (TYLENOL ARTHRITIS  "PAIN PO) Take 650 mg by mouth as needed       alendronate (FOSAMAX) 70 MG tablet TAKE 1 TABLET EVERY 7 DAYS AT LEAST 60 MINUTES BEFORE BREAKFAST AS DIRECTED \"SEE PACKAGE FOR ADDITIONAL INSTRUCTIONS\" (Patient taking differently: TAKE 1 TABLET EVERY 7 DAYS AT LEAST 60 MINUTES BEFORE BREAKFAST AS DIRECTED \"SEE PACKAGE FOR ADDITIONAL INSTRUCTIONS\" TUESDAY) 12 tablet 0     ASPIRIN PO Take 81 mg by mouth At Bedtime       Calcium Citrate-Vitamin D (CALCIUM + D PO) Take by mouth 2 times daily       colestipol (COLESTID) 1 g tablet 1 TABLET ( 1 g)  BID  - TAKE OTHER MEDS 1 HOUR BEFORE OR 4 HOURS AFTER COLESID  2     cycloSPORINE (RESTASIS) 0.05 % ophthalmic emulsion Place 1 drop into both eyes 2 times daily       Ferrous Sulfate (IRON SUPPLEMENT PO) Take 325 mg by mouth every morning        furosemide (LASIX) 20 MG tablet Take 1 tablet (20 mg) by mouth every morning       irbesartan (AVAPRO) 300 MG tablet TAKE 1 TABLET EVERY DAY (Patient taking differently: Take 300 mg by mouth every morning TAKE 1 TABLET EVERY DAY) 90 tablet 2     lovastatin (MEVACOR) 40 MG tablet TAKE 1 TABLET AT BEDTIME (HYPERLIPIDEMIA LDL GOAL BELOW 130) 90 tablet 2     methimazole (TAPAZOLE) 5 MG tablet Take 1 tablet (5 mg) by mouth daily (Patient taking differently: Take 5 mg by mouth every morning ) 90 tablet 1     Omega-3 Fatty Acids (FISH OIL) 500 MG CAPS Take 1 capsule by mouth 2 times daily        omeprazole (PRILOSEC) 20 MG CR capsule Take 1 capsule (20 mg) by mouth daily (Patient taking differently: Take 20 mg by mouth every morning ) 180 capsule 3     polyethylene glycol 0.4%- propylene glycol 0.3% (SYSTANE) 0.4-0.3 % SOLN ophthalmic solution Place 1 drop into both eyes 2 times daily        Probiotic Product (PROBIOTIC ADVANCED PO) Take by mouth every morning        propranolol (INDERAL LA) 60 MG 24 hr capsule Take 1 capsule (60 mg) by mouth daily (Patient taking differently: Take 60 mg by mouth every morning ) 90 capsule 1     rOPINIRole " (REQUIP) 0.25 MG tablet TAKE 1 TABLET(0.25 MG) BY MOUTH EVERY NIGHT AS NEEDED (Patient taking differently: TAKE 1 TABLET(0.25 MG) BY MOUTH EVERY EVENING) 90 tablet 0     sertraline (ZOLOFT) 100 MG tablet Take 1 tablet (100 mg) by mouth daily (Patient taking differently: Take 100 mg by mouth every morning ) 90 tablet 1     traZODone (DESYREL) 50 MG tablet TAKE 1 TABLET(50 MG) BY MOUTH EVERY NIGHT AS NEEDED FOR SLEEP (Patient taking differently: TAKE 1/2 TABLET(25 MG)  AT BEDTIME) 90 tablet 1     DiazePAM (VALIUM PO) Take 2 mg by mouth as needed for anxiety        Melatonin 10 MG TABS tablet Take 10 mg by mouth as needed for sleep        nitroGLYcerin (NITROSTAT) 0.4 MG sublingual tablet For chest pain place 1 tablet under the tongue every 5 minutes for 3 doses. If symptoms persist 5 minutes after 1st dose call 911. 25 tablet 3     order for DME Equipment being ordered: Knee high compression for B LE 20-30mmHg, Velcro units for night time (consider hybrid sock as foot swelling is less than leg swelling), Donning device 1 each 2       Allergies  Allergies   Allergen Reactions     No Known Drug Allergies        Social History  Social History     Social History     Marital status:      Spouse name:      Number of children: 2     Years of education: N/A     Occupational History      Retired     Social History Main Topics     Smoking status: Never Smoker     Smokeless tobacco: Never Used     Alcohol use No      Comment: 6 times per year-one drink     Drug use: No     Sexual activity: Yes     Partners: Male     Other Topics Concern      Service No     Blood Transfusions No     Exercise Yes     curves     Seat Belt Yes     Self-Exams Yes     Parent/Sibling W/ Cabg, Mi Or Angioplasty Before 65f 55m? No     Social History Narrative    December 7, 2009    Balanced Diet - Yes    Osteoporosis Prevention Measures - Dairy servings per day: 2 and Medication/Supplements (See current meds)    Regular Exercise -   Yes Describe curves, walking    Dental Exam - YES - Date: 10/2009    Eye Exam - YES - Date: 2008    Self Breast Exam - Yes    Abuse: Current or Past (Physical, Sexual or Emotional)- No    Do you feel safe in your environment - Yes    Guns stored in the home - No    Sunscreen used - Yes    Seatbelts used - Yes    Lipids -  YES - Date: 11/24/2009    Glucose -  YES - Date: 11/24/2009    Colon Cancer Screening - Colonoscopy 11/18/2005(date completed)    Hemoccults - YES - Date: 10/12/2004    Pap Test -  YES - Date: 09/22/2004    Do you have any concerns about STD's -  No    Mammography - YES - Date: 11/24/2009    DEXA - YES - Date: 11/20/2008    Immunizations reviewed and up to date - Yes    PAULETTE Spencer CMA                   Family History  Family History   Problem Relation Age of Onset     Cancer Father      dec - esophageal and laryngeal     HEART DISEASE Mother      Respiratory Mother      dec     Breast Cancer Daughter      Other Cancer Daughter      Thyroid Disease Daughter      Asthma Daughter      Hyperlipidemia Son      Diabetes Son        ROS/MED HX    ENT/Pulmonary:     (+)JESS risk factors snores loudly, hypertension, , . .   (-) tobacco use   Neurologic:  - neg neurologic ROS   (+)other neuro tremors     Cardiovascular: Comment: CP episode 12/13/17 with elevated troponin. 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 Angiogram no CAD. F/U ECHO Chaitanya EF 65%.   AF with RVR in setting of hyperthyroidism. Spontaneous conversion. Managed with B-blocker, ACEI, statin and ASA.   On Tapaxole.  last endocrine  visit 1/2018. Xarelto DC'd 2/2 bleeding.         (+) hypertension----. Taking blood thinners : . CHF etiology: Stress induced cardiomyopathy 12/13/17 Last EF: 55-60% . . :. dysrhythmias a-fib, . Previous cardiac testing Echodate:results:date: results:ECG reviewed date: results:Cath date: results:          METS/Exercise Tolerance: Comment: METS<4 1 - Eating, dressing   Hematologic:  - neg hematologic  ROS      "  Musculoskeletal: Comment: Osteopenia    Osteoarthritis in knees bilaterally.  - neg musculoskeletal ROS       GI/Hepatic:     (+) GERD Asymptomatic on medication, cholecystitis/cholelithiasis (s/p cholecystectomy),       Renal/Genitourinary:     (+) Nephrolithiasis , Other Renal/ Genitourinary, Hematuria      Endo:     (+) thyroid problem (Graves disease)  hyperthyroidism, Obesity (BMI 32), .      Psychiatric:     (+) psychiatric history depression      Infectious Disease:  - neg infectious disease ROS       Malignancy:   (+) Malignancy History of Other  Skin CA status post Surgery, Other CA Urothelial cancer Active status post Surgery         Other:    (+) No chance of pregnancy no H/O Chronic Pain,other significant disability Other (comment)         Temp: 97.9  F (36.6  C) Temp src: Oral BP: 137/70 Pulse: 70   Resp: 16 SpO2: 95 %         147 lbs 1.6 oz  4' 9\"   Body mass index is 31.83 kg/(m^2).       Physical Exam  Constitutional: Awake, alert, cooperative, no apparent distress, and appears stated age.  Eyes: Pupils equal, round and reactive to light, extra ocular muscles intact, sclera clear, conjunctiva normal.  HENT: Normocephalic, oral pharynx with moist mucus membranes, good dentition. No goiter appreciated.   Respiratory: Clear to auscultation bilaterally, no crackles or wheezing.  Cardiovascular: Regular rate and rhythm, normal S1 and S2, and no murmur noted.  Carotids +2,right sided bruit. No edema. Palpable pulses to radial  DP and PT arteries.   GI: Normal bowel sounds, soft, non-distended, non-tender, no masses palpated, no hepatosplenomegaly.  Lymph/Hematologic: No cervical lymphadenopathy and no supraclavicular lymphadenopathy.  Genitourinary:  deferred  Skin: Warm and dry.    Musculoskeletal: Full ROM of neck. There is no redness, warmth, or swelling of the joints. Gross motor strength is normal.    Neurologic: Awake, alert, oriented to name, place and time. Cranial nerves II-XII are grossly " intact. Gait is normal.   Neuropsychiatric: Calm, cooperative. Normal affect.     Labs: (personally reviewed)  Lab Results   Component Value Date    WBC 6.0 08/27/2018     Lab Results   Component Value Date    RBC 4.17 08/27/2018     Lab Results   Component Value Date    HGB 11.5 08/27/2018     Lab Results   Component Value Date    HCT 37.2 08/27/2018     Lab Results   Component Value Date    MCV 89 08/27/2018     Lab Results   Component Value Date    MCH 27.6 08/27/2018     Lab Results   Component Value Date    MCHC 30.9 08/27/2018     Lab Results   Component Value Date    RDW 13.7 08/27/2018     Lab Results   Component Value Date     08/27/2018       Last Comprehensive Metabolic Panel:  Sodium   Date Value Ref Range Status   08/27/2018 136 133 - 144 mmol/L Final     Potassium   Date Value Ref Range Status   08/27/2018 5.1 3.4 - 5.3 mmol/L Final     Chloride   Date Value Ref Range Status   08/27/2018 102 94 - 109 mmol/L Final     Carbon Dioxide   Date Value Ref Range Status   08/27/2018 32 20 - 32 mmol/L Final     Anion Gap   Date Value Ref Range Status   08/27/2018 3 3 - 14 mmol/L Final     Glucose   Date Value Ref Range Status   08/27/2018 97 70 - 99 mg/dL Final     Urea Nitrogen   Date Value Ref Range Status   08/27/2018 16 7 - 30 mg/dL Final     Creatinine   Date Value Ref Range Status   08/27/2018 0.75 0.52 - 1.04 mg/dL Final     GFR Estimate   Date Value Ref Range Status   08/27/2018 73 >60 mL/min/1.7m2 Final     Comment:     Non  GFR Calc     Calcium   Date Value Ref Range Status   08/27/2018 9.3 8.5 - 10.1 mg/dL Final     Procedures  Echocardiogram 7/23/18  Interpretation Summary  Limited evaluation given suboptimal image quality.     The left ventricular ejection fraction is visually estimated at 55-60 %. No  regional wall motion abnormalities are seen.  Global right ventricular function is normal.  There is severe left atrial enlargement.  Valves are not well seen, though there  appears to be mild aortic  insufficiency.  No pericardial effusion is present.  This study was compared with the study from 3/14/18 . There has been no  change.     ECG SR 74 bpm with sinus arrhythmia and Left axis deviation (7/16/18)    Coronary angiogram 12/13/17  Conclusion:  Normal coronary arteries.      ASSESSMENT and PLAN  Dimple Oviedo is a 85 year old female scheduled to undergo Cystoscopy, Bilateral Ureteroscopy, Random Bladder Biopsies, Retrogram Pyelograms, Ureteral Washings on 9/10/18 with Dr. King at [facility] under general anesthesia.     She has the following specific operative considerations:   - METS:  <4. RCRI : Congestive Heart Failure (pulmonary edema, PND, s3 mary beth, CXR with pulmonary congestion, basilar rales).  0.9 % risk of major adverse cardiac event.  - JESS # of risks 3/8 = intermediate  - VTE risk:  0.5-3%.  Increased VTE risk: Recommend use of mechanical/chemical VTE prophylaxis in the sandip- and postoperative periods.    - Known PONV >>> anti-emetic intervention recommended.  - Post-op delirium risk: elevated given age    1.  Cardiology -H/O stress cardiomyopathy with recovered EF.  EF 55-60% (echo 7/2018). Coronary angiogram 12/2017 with normal coronary arteries.    H/O atrial fibrillation with rapid ventricular response (1/2018). Thought to be r/t to fluid overload.  Xarelto stopped after first dose given significant hematuria.  Holter monitor in May WNL.  Take beta blocker DOS.  HTN, hold ARB and diuretic DOS.  HLD, take statin DOS.  Denies any cardiac symptoms.  Right carotid bruit on exam today.  Carotid US tomorrow.     2.  Pulmonary - no smoking hx  3.  Hematology - Anemia: Recommend use of blood conservation techniques intraoperatively and close monitoring of postoperative bleeding.  Hgb 11.5 today.  On Iron replacement.   4.  GI - GERD: Patient instructed to take PPI as prescribed.  Consider use of RSI techniques with advanced airway maneuvers.  5.   Endocrine - Graves disease, take Tapazole as prescribed DOS.    6.  Neuro - depression, take antidepressant DOS  7.   - Hematuria with positive FISH and atypical cytology with above procedure planned.      - Anesthesia considerations:  Refer to PAC assessment in anesthesia records      Arrival time, NPO, shower and medication instructions provided by nursing staff today.  Preparing For Your Surgery handout given.  Patient was discussed with Dr Carpenter. I spent 30 minutes face to face with patient assessing, educating, counseling and/or coordinating care and examining the patient.  Of that 30 minutes, I spent greater than 50% of my time counseling and/or coordinating care.      NELSON Noble CNP  Preoperative Assessment Center  [facility]  Clinic and Surgery Center  Phone: [phone]  Fax: [phone]

## 2018-08-27 NOTE — PATIENT INSTRUCTIONS
Preparing for Your Surgery      Name:  Dimple Oviedo   MRN:  [id number]   :  1933   Today's Date:  2018     Arriving for surgery:  Surgery date:  9/10/18  Arrival time:  7:30AM  Please come to:     [facility]  [address] [place] [id number]     Parking is available in front of the Clinics and Surgery Center building from 5:30AM to 8:00PM.  -  Proceed to the 5th floor to check into the Ambulatory Surgery Center.              >> There will be patient concierges on the 1st and 5th floor, for assistance or an escort, if you would like.              >> Please call [phone] with any questions.    What can I eat or drink?  -  You may have solid food or milk products until Midnight prior to your surgery.  -  You may have water, apple juice or 7up/Sprite until 2 hours prior to your surgery. 7:00AM    Which medicines can I take?  Stop Aspirin and fish oil one week prior to surgery.  Hold Ibuprofen and Naproxen for 24 hours prior to surgery.   -  Do NOT take these medications in the morning, the day of surgery:   Calcium plus D  Ferrous sulfate   Furosemide(lasix)  Irbesartan(avapro)   Probiotic    -  Please take these medications the day of surgery:   Restasis eye drops  Methimazole   Omeprazole   Systane eye drops   Propranolol(inderal)  Sertraline(Zoloft)     How do I prepare myself?  -  Take two showers: one the night before surgery; and one the morning of surgery.         Use Scrubcare or Hibiclens to wash from neck down.  You may use your own     shampoo and conditioner. No other hair products.   -  Do NOT use lotion, powder, deodorant, or antiperspirant the day of your surgery.  -  Do NOT wear any makeup, fingernail polish or jewelry.  - Do not bring your own medications to the hospital, except for inhalers and eye   drops.  -  Bring your ID and insurance card.    Questions or Concerns:  -If you are scheduled at the Ambulatory Surgery Center please call  [phone].    -For questions after surgery please call your surgeons office.

## 2018-08-27 NOTE — ANESTHESIA PREPROCEDURE EVALUATION
Anesthesia Evaluation     . Pt has had prior anesthetic. Type: General    History of anesthetic complications   - PONV        ROS/MED HX    ENT/Pulmonary:     (+)JESS risk factors snores loudly, hypertension, , . .   (-) tobacco use   Neurologic:  - neg neurologic ROS   (+)other neuro tremors     Cardiovascular: Comment: CP episode 12/13/17 with elevated troponin. 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 Angiogram no CAD. F/U ECHO Chaitanya EF 65%.   AF with RVR in setting of hyperthyroidism. Spontaneous conversion. Managed with B-blocker, ACEI, statin and ASA.   On Tapaxole.  last endocrine  visit 1/2018. Xarelto DC'd 2/2 bleeding.         (+) hypertension----. Taking blood thinners : . CHF etiology: Stress induced cardiomyopathy 12/13/17 Last EF: 55-60% . . :. dysrhythmias a-fib, . Previous cardiac testing Echodate:results:date: results:ECG reviewed date: results:Cath date: results:          METS/Exercise Tolerance: Comment: METS<4 1 - Eating, dressing   Hematologic:  - neg hematologic  ROS       Musculoskeletal: Comment: Osteopenia    Osteoarthritis in knees bilaterally.  - neg musculoskeletal ROS       GI/Hepatic:     (+) GERD Asymptomatic on medication, cholecystitis/cholelithiasis (s/p cholecystectomy),       Renal/Genitourinary:     (+) Nephrolithiasis , Other Renal/ Genitourinary, Hematuria      Endo:     (+) thyroid problem (Graves disease)  hyperthyroidism, Obesity (BMI 32), .      Psychiatric:     (+) psychiatric history depression      Infectious Disease:  - neg infectious disease ROS       Malignancy:   (+) Malignancy History of Other  Skin CA status post Surgery, Other CA Urothelial cancer Active status post Surgery         Other:    (+) No chance of pregnancy no H/O Chronic Pain,other significant disability Other (comment)                   Physical Exam  Normal systems: dental    Airway   Mallampati: II  TM distance: >3 FB  Neck ROM: full    Dental     Cardiovascular   Rhythm and rate: regular and  normal      Pulmonary    breath sounds clear to auscultation    Other findings: Procedures  Echocardiogram 7/23/18  Interpretation Summary  Limited evaluation given suboptimal image quality.     The left ventricular ejection fraction is visually estimated at 55-60 %. No  regional wall motion abnormalities are seen.  Global right ventricular function is normal.  There is severe left atrial enlargement.  Valves are not well seen, though there appears to be mild aortic  insufficiency.  No pericardial effusion is present.  This study was compared with the study from 3/14/18 . There has been no  change.     ECG SR 74 bpm with sinus arrhythmia and Left axis deviation (7/16/18)    Coronary angiogram 12/13/17  Conclusion:  Normal coronary arteries.             PAC Discussion and Assessment    ASA Classification: 3  Case is suitable for: ASC  Anesthetic techniques and relevant risks discussed: GA  Invasive monitoring and risk discussed:   Types:   Possibility and Risk of blood transfusion discussed:   NPO instructions given:   Additional anesthetic preparation and risks discussed:   Needs early admission to pre-op area:   Other:     PAC Resident/NP Anesthesia Assessment:  Dimple Oviedo is an 85-year-old female scheduled for Cystoscopy, Bilateral Ureteroscopy, Random Bladder Biopsies, Retrogram Pyelograms, Ureteral Washings on 9/10/18 with Dr. King at [facility] under general anesthesia.  Ms. Oviedo has been followed by Dr. King for a history of positive FISH and atypical cytology.  She last saw him on 7/26/18 for follow up cystoscopy given ongoing hematuria.  The above procedure was recommended.     Ms. Oviedo presents to PAC with her daughter-in-law.  She denies any chest pain, SOB, palpitations, syncope, HERMAN, orthopnea, or PND.   She does suffer from lymphedema.  She has a h/o stress cardiomyopathy (12/2017) with recovered EF and history of atrial fibrillation with rapid ventricular response  (1/2018).  She underwent Holter monitoring this past July that was within normal limits.  She continues to have intermittent hematuria.  She would like to proceed with above surgical intervention.      She has the following specific operative considerations:   - METS:  <4. RCRI : Congestive Heart Failure (pulmonary edema, PND, s3 mary beth, CXR with pulmonary congestion, basilar rales).  0.9 % risk of major adverse cardiac event.  - JESS # of risks 3/8 = intermediate  - VTE risk:  0.5-3%.  Increased VTE risk: Recommend use of mechanical/chemical VTE prophylaxis in the sandip- and postoperative periods.    - Known PONV >>> anti-emetic intervention recommended.  - Post-op delirium risk: elevated given age    1.  Cardiology -H/O stress cardiomyopathy with recovered EF.  Coronary angiogram 12/2017 with normal coronary arteries.  EF 55-60% (echo 7/2018).  H/O atrial fibrillation with rapid ventricular response (1/2018). Thought to be r/t to fluid overload.  Xarelto stopped after first dose given significant hematuria.  Holter monitor in May WNL.  HTN & HLD.  Denies any cardiac symptoms.  Right carotid bruit on exam today.  Carotid US tomorrow.     2.  Pulmonary - no smoking hx  3.  Hematology - Anemia: Recommend use of blood conservation techniques intraoperatively and close monitoring of postoperative bleeding.  Orders entered for a type & screen prior to surgery.    4.  GI - GERD: Patient instructed to take PPI as prescribed.  Consider use of RSI techniques with advanced airway maneuvers.  5.  Endocrine - Graves disease  6.  Neuro - depression, take antidepressant DOS  7.   - Hematuria with positive FISH and atypical cytology with above procedure planned.      - Anesthesia considerations:  Refer to PAC assessment in anesthesia records      Arrival time, NPO, shower and medication instructions provided by nursing staff today.  Preparing For Your Surgery handout given.  Patient was discussed with Dr Carpenter. I spent 30 minutes  face to face with patient assessing, educating, counseling and/or coordinating care and examining the patient.  Of that 30 minutes, I spent greater than 50% of my time counseling and/or coordinating care.      Reviewed and Signed by PAC Mid-Level Provider/Resident  Mid-Level Provider/Resident: Antoinette DAMON CNP  Date: 8/24/2018  Time: 14:43    Attending Anesthesiologist Anesthesia Assessment:  85 year old for cystoscopy and biopsies for urothelial cancer. JESS risk; no significant cardiac or pulmonary history. Prior graves disease, atrial fibrillation.     Patient/case discussed with DARON. No need to see patient. Patient is appropriate for the planned procedure without further work-up or medical management.      Reviewed and Signed by PAC Anesthesiologist  Anesthesiologist: thai  Date: 8/27/2018  Time:   Pass/Fail: Pass  Disposition:     PAC Pharmacist Assessment:        Pharmacist:   Date:   Time:      Anesthesia Plan      History & Physical Review  History and physical reviewed and following examination; no interval change.    ASA Status:  3 .    NPO Status:  > 8 hours    Plan for General and LMA with Intravenous and Propofol induction. Maintenance will be Balanced.    PONV prophylaxis:  Ondansetron (or other 5HT-3) and Dexamethasone or Solumedrol       Postoperative Care  Postoperative pain management:  IV analgesics, Oral pain medications and Multi-modal analgesia.      Consents  Anesthetic plan, risks, benefits and alternatives discussed with:  Patient..            ANESTHESIA PREOP EVALUATION    PROCEDURE: Procedure(s):  Cystoscopy, Bilateral Ureteroscopy, Random Bladder Biopsies, Retrogram Pyelograms, Ureteral Washings - Wound Class:     HPI: Dimple Oviedo is a 85 year old female who presents for above procedure 2/2    PAST MEDICAL HISTORY:    Past Medical History:   Diagnosis Date     Calculus of kidney      Esophageal reflux      GERD (gastroesophageal reflux disease)      Hyperlipidemia LDL goal <130  5/9/2010     Malignant melanoma of skin of trunk, except scrotum (H)      Nonspecific abnormal finding     has living will 2004 -      Nontoxic multinodular goiter     no further eval /tx rec per pt     Osteopenia      Other psoriasis      Personal history of colonic polyps      PMR (polymyalgia rheumatica) (H)      Stress-induced cardiomyopathy      Undiagnosed cardiac murmurs      Unspecified constipation      Unspecified essential hypertension        PAST SURGICAL HISTORY:    Past Surgical History:   Procedure Laterality Date     BIOPSY       C NONSPECIFIC PROCEDURE  2005    colonoscopy polyp repeat 2010     COLONOSCOPY  2014     COMBINED CYSTOSCOPY, RETROGRADES, URETEROSCOPY, INSERT STENT Bilateral 4/3/2018    Procedure: COMBINED CYSTOSCOPY, RETROGRADES, URETEROSCOPY, INSERT STENT;;  Surgeon: Stuart King MD;  Location: [facility] OR     CYSTOSCOPY, BIOPSY BLADDER INSTILL OPTICAL AGENT N/A 4/3/2018    Procedure: CYSTOSCOPY, BIOPSY BLADDER INSTILL OPTICAL AGENT;  Cystoscopy, Blue Light Cystoscopy, Bladder Biopsies, Bilateral Selective ureteral washings for Cytology, Bilateral Retrograde Pyelograms, Bilateral Ureteroscopy;  Surgeon: Stuart King MD;  Location: [facility] OR     CYSTOSCOPY, BIOPSY BLADDER, COMBINED N/A 2/19/2018    Procedure: COMBINED CYSTOSCOPY, BIOPSY BLADDER;  Cystoscopy, Bladder Biopsy;  Surgeon: Kenna La MD;  Location: [facility] OR     ENDOSCOPIC ULTRASOUND LOWER GASTROINTESTIONAL TRACT (GI) N/A 10/30/2015    Procedure: ENDOSCOPIC ULTRASOUND LOWER GASTROINTESTIONAL TRACT (GI);  Surgeon: Daniel Jean-Baptiste MD;  Location: [facility] OR     EYE SURGERY  12/4/17     LAPAROSCOPIC CHOLECYSTECTOMY WITH CHOLANGIOGRAMS N/A 11/1/2015    Procedure: LAPAROSCOPIC CHOLECYSTECTOMY WITH CHOLANGIOGRAMS;  Surgeon: Tonie Warren MD;  Location: [facility] OR     SURGICAL HISTORY OF -   1996    malignant melanoma     SURGICAL HISTORY OF -   1968    thyroid nodule     SURGICAL HISTORY OF -       D & C  "      PAST ANESTHESIA HISTORY:     No personal or family h/o anesthesia problems    SOCIAL HISTORY:       Social History   Substance Use Topics     Smoking status: Never Smoker     Smokeless tobacco: Never Used     Alcohol use No      Comment: 6 times per year-one drink       ALLERGIES:     Allergies   Allergen Reactions     No Known Drug Allergies        MEDICATIONS:       (Not in a hospital admission)    Current Outpatient Prescriptions   Medication Sig Dispense Refill     Acetaminophen (TYLENOL ARTHRITIS PAIN PO) Take 650 mg by mouth as needed       alendronate (FOSAMAX) 70 MG tablet TAKE 1 TABLET EVERY 7 DAYS AT LEAST 60 MINUTES BEFORE BREAKFAST AS DIRECTED \"SEE PACKAGE FOR ADDITIONAL INSTRUCTIONS\" (Patient taking differently: TAKE 1 TABLET EVERY 7 DAYS AT LEAST 60 MINUTES BEFORE BREAKFAST AS DIRECTED \"SEE PACKAGE FOR ADDITIONAL INSTRUCTIONS\" TUESDAY) 12 tablet 0     ASPIRIN PO Take 81 mg by mouth At Bedtime       Calcium Citrate-Vitamin D (CALCIUM + D PO) Take by mouth 2 times daily       colestipol (COLESTID) 1 g tablet 1 TABLET ( 1 g)  BID  - TAKE OTHER MEDS 1 HOUR BEFORE OR 4 HOURS AFTER COLESID  2     cycloSPORINE (RESTASIS) 0.05 % ophthalmic emulsion Place 1 drop into both eyes 2 times daily       DiazePAM (VALIUM PO) Take 2 mg by mouth as needed for anxiety        Ferrous Sulfate (IRON SUPPLEMENT PO) Take 325 mg by mouth every morning        furosemide (LASIX) 20 MG tablet Take 1 tablet (20 mg) by mouth every morning       irbesartan (AVAPRO) 300 MG tablet TAKE 1 TABLET EVERY DAY (Patient taking differently: Take 300 mg by mouth every morning TAKE 1 TABLET EVERY DAY) 90 tablet 2     lovastatin (MEVACOR) 40 MG tablet TAKE 1 TABLET AT BEDTIME (HYPERLIPIDEMIA LDL GOAL BELOW 130) 90 tablet 2     Melatonin 10 MG TABS tablet Take 10 mg by mouth as needed for sleep        methimazole (TAPAZOLE) 5 MG tablet Take 1 tablet (5 mg) by mouth daily (Patient taking differently: Take 5 mg by mouth every morning ) 90 " "tablet 1     nitroGLYcerin (NITROSTAT) 0.4 MG sublingual tablet For chest pain place 1 tablet under the tongue every 5 minutes for 3 doses. If symptoms persist 5 minutes after 1st dose call 911. 25 tablet 3     Omega-3 Fatty Acids (FISH OIL) 500 MG CAPS Take 1 capsule by mouth 2 times daily        omeprazole (PRILOSEC) 20 MG CR capsule Take 1 capsule (20 mg) by mouth daily (Patient taking differently: Take 20 mg by mouth every morning ) 180 capsule 3     order for DME Equipment being ordered: Knee high compression for B LE 20-30mmHg, Velcro units for night time (consider hybrid sock as foot swelling is less than leg swelling), Donning device 1 each 2     polyethylene glycol 0.4%- propylene glycol 0.3% (SYSTANE) 0.4-0.3 % SOLN ophthalmic solution Place 1 drop into both eyes 2 times daily        Probiotic Product (PROBIOTIC ADVANCED PO) Take by mouth every morning        propranolol (INDERAL LA) 60 MG 24 hr capsule Take 1 capsule (60 mg) by mouth daily (Patient taking differently: Take 60 mg by mouth every morning ) 90 capsule 1     rOPINIRole (REQUIP) 0.25 MG tablet TAKE 1 TABLET(0.25 MG) BY MOUTH EVERY NIGHT AS NEEDED (Patient taking differently: TAKE 1 TABLET(0.25 MG) BY MOUTH EVERY EVENING) 90 tablet 0     sertraline (ZOLOFT) 100 MG tablet Take 1 tablet (100 mg) by mouth daily (Patient taking differently: Take 100 mg by mouth every morning ) 90 tablet 1     traZODone (DESYREL) 50 MG tablet TAKE 1 TABLET(50 MG) BY MOUTH EVERY NIGHT AS NEEDED FOR SLEEP (Patient taking differently: TAKE 1/2 TABLET(25 MG)  AT BEDTIME) 90 tablet 1       Current Outpatient Prescriptions Ordered in [facility]   Medication Sig Dispense Refill     Acetaminophen (TYLENOL ARTHRITIS PAIN PO) Take 650 mg by mouth as needed       alendronate (FOSAMAX) 70 MG tablet TAKE 1 TABLET EVERY 7 DAYS AT LEAST 60 MINUTES BEFORE BREAKFAST AS DIRECTED \"SEE PACKAGE FOR ADDITIONAL INSTRUCTIONS\" (Patient taking differently: TAKE 1 TABLET EVERY 7 DAYS AT LEAST 60 " "MINUTES BEFORE BREAKFAST AS DIRECTED \"SEE PACKAGE FOR ADDITIONAL INSTRUCTIONS\" TUESDAY) 12 tablet 0     ASPIRIN PO Take 81 mg by mouth At Bedtime       Calcium Citrate-Vitamin D (CALCIUM + D PO) Take by mouth 2 times daily       colestipol (COLESTID) 1 g tablet 1 TABLET ( 1 g)  BID  - TAKE OTHER MEDS 1 HOUR BEFORE OR 4 HOURS AFTER COLESID  2     cycloSPORINE (RESTASIS) 0.05 % ophthalmic emulsion Place 1 drop into both eyes 2 times daily       DiazePAM (VALIUM PO) Take 2 mg by mouth as needed for anxiety        Ferrous Sulfate (IRON SUPPLEMENT PO) Take 325 mg by mouth every morning        furosemide (LASIX) 20 MG tablet Take 1 tablet (20 mg) by mouth every morning       irbesartan (AVAPRO) 300 MG tablet TAKE 1 TABLET EVERY DAY (Patient taking differently: Take 300 mg by mouth every morning TAKE 1 TABLET EVERY DAY) 90 tablet 2     lovastatin (MEVACOR) 40 MG tablet TAKE 1 TABLET AT BEDTIME (HYPERLIPIDEMIA LDL GOAL BELOW 130) 90 tablet 2     Melatonin 10 MG TABS tablet Take 10 mg by mouth as needed for sleep        methimazole (TAPAZOLE) 5 MG tablet Take 1 tablet (5 mg) by mouth daily (Patient taking differently: Take 5 mg by mouth every morning ) 90 tablet 1     nitroGLYcerin (NITROSTAT) 0.4 MG sublingual tablet For chest pain place 1 tablet under the tongue every 5 minutes for 3 doses. If symptoms persist 5 minutes after 1st dose call 911. 25 tablet 3     Omega-3 Fatty Acids (FISH OIL) 500 MG CAPS Take 1 capsule by mouth 2 times daily        omeprazole (PRILOSEC) 20 MG CR capsule Take 1 capsule (20 mg) by mouth daily (Patient taking differently: Take 20 mg by mouth every morning ) 180 capsule 3     order for DME Equipment being ordered: Knee high compression for B LE 20-30mmHg, Velcro units for night time (consider hybrid sock as foot swelling is less than leg swelling), Donning device 1 each 2     polyethylene glycol 0.4%- propylene glycol 0.3% (SYSTANE) 0.4-0.3 % SOLN ophthalmic solution Place 1 drop into both eyes " 2 times daily        Probiotic Product (PROBIOTIC ADVANCED PO) Take by mouth every morning        propranolol (INDERAL LA) 60 MG 24 hr capsule Take 1 capsule (60 mg) by mouth daily (Patient taking differently: Take 60 mg by mouth every morning ) 90 capsule 1     rOPINIRole (REQUIP) 0.25 MG tablet TAKE 1 TABLET(0.25 MG) BY MOUTH EVERY NIGHT AS NEEDED (Patient taking differently: TAKE 1 TABLET(0.25 MG) BY MOUTH EVERY EVENING) 90 tablet 0     sertraline (ZOLOFT) 100 MG tablet Take 1 tablet (100 mg) by mouth daily (Patient taking differently: Take 100 mg by mouth every morning ) 90 tablet 1     traZODone (DESYREL) 50 MG tablet TAKE 1 TABLET(50 MG) BY MOUTH EVERY NIGHT AS NEEDED FOR SLEEP (Patient taking differently: TAKE 1/2 TABLET(25 MG)  AT BEDTIME) 90 tablet 1     No current Epic-ordered facility-administered medications on file.        PHYSICAL EXAM:    Vitals: T Data Unavailable, P Data Unavailable, BP Data Unavailable, R Data Unavailable, SpO2  , Weight Wt Readings from Last 2 Encounters:   08/27/18 66.7 kg (147 lb 1.6 oz)   08/14/18 67.3 kg (148 lb 6.4 oz)       See doc flowsheet      No Data Recorded    NPO STATUS: see doc flowsheet    LABS:    BMP:  Recent Labs   Lab Test  08/27/18   1517   NA  136   POTASSIUM  5.1   CHLORIDE  102   CO2  32   BUN  16   CR  0.75   GLC  97   SHREYA  9.3       LFTs:   Recent Labs   Lab Test  07/16/18   0846   PROTTOTAL  6.6*   ALBUMIN  3.1*   BILITOTAL  0.5   ALKPHOS  84   AST  24   ALT  32       CBC:   Recent Labs   Lab Test  08/27/18   1517   WBC  6.0   RBC  4.17   HGB  11.5*   HCT  37.2   MCV  89   MCH  27.6   MCHC  30.9*   RDW  13.7   PLT  317       Coags:  Recent Labs   Lab Test  01/16/18   1247   INR  1.71*       Imaging:  No orders to display       Derrell Burton MD  Anesthesiology Staff  Pager [phone]    9/9/2018  9:37 PM                  .

## 2018-08-27 NOTE — MR AVS SNAPSHOT
After Visit Summary   2018    Dimple Oviedo    MRN: [id number]           Patient Information     Date Of Birth          1933        Visit Information        Provider Department      2018 1:30 PM Antoinette Saldaña, APRN CNP M [facility]        Today's Diagnoses     Gross hematuria    -  1      Care Instructions    Preparing for Your Surgery      Name:  Dimple Oviedo   MRN:  [id number]   :  1933   Today's Date:  2018     Arriving for surgery:  Surgery date:  9/10/18  Arrival time:  7:30AM  Please come to:     [facility]  [address] [place] [id number]     Parking is available in front of the Clinics and Surgery Center building from 5:30AM to 8:00PM.  -  Proceed to the 5th floor to check into the Ambulatory Surgery Center.              >> There will be patient concierges on the 1st and 5th floor, for assistance or an escort, if you would like.              >> Please call [phone] with any questions.    What can I eat or drink?  -  You may have solid food or milk products until Midnight prior to your surgery.  -  You may have water, apple juice or 7up/Sprite until 2 hours prior to your surgery. 7:00AM    Which medicines can I take?  Stop Aspirin and fish oil one week prior to surgery.  Hold Ibuprofen and Naproxen for 24 hours prior to surgery.   -  Do NOT take these medications in the morning, the day of surgery:   Calcium plus D  Ferrous sulfate   Furosemide(lasix)  Irbesartan(avapro)   Probiotic    -  Please take these medications the day of surgery:   Restasis eye drops  Methimazole   Omeprazole   Systane eye drops   Propranolol(inderal)  Sertraline(Zoloft)     How do I prepare myself?  -  Take two showers: one the night before surgery; and one the morning of surgery.         Use Scrubcare or Hibiclens to wash from neck down.  You may use your own     shampoo and conditioner. No other hair products.    -  Do NOT use lotion, powder, deodorant, or antiperspirant the day of your surgery.  -  Do NOT wear any makeup, fingernail polish or jewelry.  - Do not bring your own medications to the hospital, except for inhalers and eye   drops.  -  Bring your ID and insurance card.    Questions or Concerns:  -If you are scheduled at the Ambulatory Surgery Center please call [phone].    -For questions after surgery please call your surgeons office.                     Follow-ups after your visit        Your next 10 appointments already scheduled     Aug 27, 2018  2:50 PM CDT   (Arrive by 2:35 PM)   PAC Anesthesia Consult with  Pac Anesthesiologist   [facility] ([facility] [place])    [address] [place] [address]  4th Floor  [facility] [id number] [phone]            Sep 05, 2018  2:00 PM CDT   Pre-Op physical with Pop Zapien MD   [facility] ([facility])    [address] [place] [id number] [phone]            Sep 10, 2018   Procedure with Stuart King MD   [facility] ([facility] [place])    [address] [place] [address]  5th Floor  [facility] [id number] [phone]           Located in the Clinics and Surgery Center at [address] [place] [address].   parking is very convenient and highly recommended.  is a $6 flat rate fee.  Both  and self parkers should enter the main arrival plaza from [facility]; parking attendants will direct you based on your parking preference.            Sep 27, 2018  1:30 PM CDT   (Arrive by 1:15 PM)   NEW ARRHYTHMIA with Butch Griffith MD   [facility] ([facility] [place])    [address] [place] [address]  [address]  [facility] [id number] [phone]            Oct 03, 2018 12:30 PM CDT   (Arrive by 12:15 PM)   Lymphedema Treatment with Crista Jain PT   [facility] ([facility])    [address] [place] [address]  [address]  [facility] [id number] [phone]            Nov 26, 2018  1:30 PM CST   US THYROID with UCUS2   [facility] US ([facility])    [address] [place] [address]  1st [facility] [id number] [phone]           Please bring a list of your medicines (including vitamins, minerals and over-the-counter drugs). Also, tell your doctor about any allergies you may have. Wear comfortable clothes and leave your valuables at home.  You do not need to do anything special to prepare for your exam.  Please call the Imaging Department at your exam site with any questions.            Nov 26, 2018  2:15 PM CST   LAB with  LAB   [facility] ([facility])    [address] [place] [id number] [phone]           Please do not eat 10-12 hours before your appointment if you are coming in fasting for labs on lipids, cholesterol, or glucose (sugar). This does not apply to pregnant women. Water, hot tea and black coffee (with nothing added) are okay. Do not drink other fluids, diet soda or chew gum.            Dec 04, 2018  1:30 PM CST   (Arrive by 1:15 PM)   RETURN ENDOCRINE with Dhara Meza MD   [facility] ([facility])    [address] [place] [id number] [phone]              Future tests that were ordered for you today     Open Future Orders        Priority Expected Expires Ordered    Basic metabolic panel Routine 8/27/2018 9/26/2018 8/27/2018    CBC with platelets Routine 8/27/2018 9/26/2018 8/27/2018    UA reflex to Microscopic and Culture Routine 8/27/2018 9/26/2018 8/27/2018            Who to contact     Please call your clinic at [phone] to:    Ask questions about your health    Make or cancel appointments    Discuss your medicines    Learn about your test  "results    Speak to your doctor            Additional Information About Your Visit        [employer] Information     [facility] gives you secure access to your electronic health record. If you see a primary care provider, you can also send messages to your care team and make appointments. If you have questions, please call your primary care clinic.  If you do not have a primary care provider, please call [phone] and they will assist you.      [facility] is an electronic gateway that provides easy, online access to your medical records. With [facility], you can request a clinic appointment, read your test results, renew a prescription or communicate with your care team.     To access your existing account, please contact your [facility] or call [phone] for assistance.        Care EveryWhere ID     This is your Care EveryWhere ID. This could be used by other organizations to access your [facility] medical records  [id number]        Your Vitals Were     Pulse Temperature Respirations Height Pulse Oximetry BMI (Body Mass Index)    70 97.9  F (36.6  C) (Oral) 16 1.448 m (4' 9\") 95% 31.83 kg/m2       Blood Pressure from Last 3 Encounters:   08/27/18 137/70   08/14/18 129/55   07/26/18 136/64    Weight from Last 3 Encounters:   08/27/18 66.7 kg (147 lb 1.6 oz)   08/14/18 67.3 kg (148 lb 6.4 oz)   07/26/18 66.7 kg (147 lb 1.6 oz)                 Today's Medication Changes          These changes are accurate as of 8/27/18  2:35 PM.  If you have any questions, ask your nurse or doctor.               These medicines have changed or have updated prescriptions.        Dose/Directions    alendronate 70 MG tablet   Commonly known as:  FOSAMAX   This may have changed:  See the new instructions.   Used for:  High risk medication use, Osteopenia        TAKE 1 TABLET EVERY 7 DAYS AT LEAST 60 MINUTES BEFORE BREAKFAST AS DIRECTED \"SEE PACKAGE FOR ADDITIONAL INSTRUCTIONS\"   Quantity:  12 tablet   Refills:  0 "       irbesartan 300 MG tablet   Commonly known as:  AVAPRO   This may have changed:    - how much to take  - how to take this  - when to take this  - additional instructions   Used for:  Essential hypertension with goal blood pressure less than 140/90        TAKE 1 TABLET EVERY DAY   Quantity:  90 tablet   Refills:  2       methimazole 5 MG tablet   Commonly known as:  TAPAZOLE   This may have changed:  when to take this   Used for:  Nontoxic multinodular goiter        Dose:  5 mg   Take 1 tablet (5 mg) by mouth daily   Quantity:  90 tablet   Refills:  1       omeprazole 20 MG CR capsule   Commonly known as:  priLOSEC   This may have changed:  when to take this   Used for:  Gastroesophageal reflux disease without esophagitis        Dose:  20 mg   Take 1 capsule (20 mg) by mouth daily   Quantity:  180 capsule   Refills:  3       propranolol 60 MG 24 hr capsule   Commonly known as:  INDERAL LA   This may have changed:  when to take this   Used for:  Nontoxic multinodular goiter        Dose:  60 mg   Take 1 capsule (60 mg) by mouth daily   Quantity:  90 capsule   Refills:  1       rOPINIRole 0.25 MG tablet   Commonly known as:  REQUIP   This may have changed:  See the new instructions.   Used for:  Restless legs syndrome        TAKE 1 TABLET(0.25 MG) BY MOUTH EVERY NIGHT AS NEEDED   Quantity:  90 tablet   Refills:  0       sertraline 100 MG tablet   Commonly known as:  ZOLOFT   This may have changed:  when to take this   Used for:  THERON (generalized anxiety disorder)        Dose:  100 mg   Take 1 tablet (100 mg) by mouth daily   Quantity:  90 tablet   Refills:  1       traZODone 50 MG tablet   Commonly known as:  DESYREL   This may have changed:  See the new instructions.   Used for:  Insomnia, unspecified type        TAKE 1 TABLET(50 MG) BY MOUTH EVERY NIGHT AS NEEDED FOR SLEEP   Quantity:  90 tablet   Refills:  1                Primary Care Provider Office Phone # Fax #    Pop Zapien [phone]  [phone]       [address] [place] [address]        Equal Access to Services     FILEMONDEX MICHAEL : Hadii aad ku haddarcyjacquie Ranjeet, waberylda miahtrungha, qadevta josephazeemda basiliaberrycristian, maldonado lopes cyrusabdias cuiurszula kelseamianannemarie bey. So [facility] [phone].    ATENCIÓN: Si habla español, tiene a sierra disposición servicios gratuitos de asistencia lingüística. Ryaname al [phone].    We comply with applicable federal civil rights laws and [place] laws. We do not discriminate on the basis of race, color, national origin, age, disability, sex, sexual orientation, or gender identity.            Thank you!     Thank you for choosing [facility]  for your care. Our goal is always to provide you with excellent care. Hearing back from our patients is one way we can continue to improve our services. Please take a few minutes to complete the written survey that you may receive in the mail after your visit with us. Thank you!             Your Updated Medication List - Protect others around you: Learn how to safely use, store and throw away your medicines at www.disposemymeds.org.          This list is accurate as of 8/27/18  2:35 PM.  Always use your most recent med list.                   Brand Name Dispense Instructions for use Diagnosis    alendronate 70 MG tablet    FOSAMAX    12 tablet    TAKE 1 TABLET EVERY 7 DAYS AT LEAST 60 MINUTES BEFORE BREAKFAST AS DIRECTED \"SEE PACKAGE FOR ADDITIONAL INSTRUCTIONS\"    High risk medication use, Osteopenia       ASPIRIN PO      Take 81 mg by mouth At Bedtime        CALCIUM + D PO      Take by mouth 2 times daily        colestipol 1 g tablet    COLESTID     1 TABLET ( 1 g)  BID  - TAKE OTHER MEDS 1 HOUR BEFORE OR 4 HOURS AFTER COLESID        cycloSPORINE 0.05 % ophthalmic emulsion    RESTASIS     Place 1 drop into both eyes 2 times daily        Fish Oil 500 MG Caps      Take 1 capsule by mouth 2 times daily        furosemide 20 MG tablet    LASIX     Take 1 tablet " (20 mg) by mouth every morning    Stasis edema of both lower extremities, (HFpEF) heart failure with preserved ejection fraction (H)       irbesartan 300 MG tablet    AVAPRO    90 tablet    TAKE 1 TABLET EVERY DAY    Essential hypertension with goal blood pressure less than 140/90       IRON SUPPLEMENT PO      Take 325 mg by mouth every morning        lovastatin 40 MG tablet    MEVACOR    90 tablet    TAKE 1 TABLET AT BEDTIME (HYPERLIPIDEMIA LDL GOAL BELOW 130)    Hyperlipidemia LDL goal <130       Melatonin 10 MG Tabs tablet      Take 10 mg by mouth as needed for sleep        methimazole 5 MG tablet    TAPAZOLE    90 tablet    Take 1 tablet (5 mg) by mouth daily    Nontoxic multinodular goiter       nitroGLYcerin 0.4 MG sublingual tablet    NITROSTAT    25 tablet    For chest pain place 1 tablet under the tongue every 5 minutes for 3 doses. If symptoms persist 5 minutes after 1st dose call 911.    Elevated troponin       omeprazole 20 MG CR capsule    priLOSEC    180 capsule    Take 1 capsule (20 mg) by mouth daily    Gastroesophageal reflux disease without esophagitis       order for DME     1 each    Equipment being ordered: Knee high compression for B LE 20-30mmHg, Velcro units for night time (consider hybrid sock as foot swelling is less than leg swelling), Donning device    Lymphedema of both lower extremities       PROBIOTIC ADVANCED PO      Take by mouth every morning        propranolol 60 MG 24 hr capsule    INDERAL LA    90 capsule    Take 1 capsule (60 mg) by mouth daily    Nontoxic multinodular goiter       rOPINIRole 0.25 MG tablet    REQUIP    90 tablet    TAKE 1 TABLET(0.25 MG) BY MOUTH EVERY NIGHT AS NEEDED    Restless legs syndrome       sertraline 100 MG tablet    ZOLOFT    90 tablet    Take 1 tablet (100 mg) by mouth daily    THERON (generalized anxiety disorder)       SYSTANE 0.4-0.3 % Soln ophthalmic solution   Generic drug:  polyethylene glycol 0.4%- propylene glycol 0.3%      Place 1 drop into  both eyes 2 times daily        traZODone 50 MG tablet    DESYREL    90 tablet    TAKE 1 TABLET(50 MG) BY MOUTH EVERY NIGHT AS NEEDED FOR SLEEP    Insomnia, unspecified type       TYLENOL ARTHRITIS PAIN PO      Take 650 mg by mouth as needed        VALIUM PO      Take 2 mg by mouth as needed for anxiety

## 2018-08-28 LAB
BACTERIA SPEC CULT: NORMAL
BACTERIA SPEC CULT: NORMAL
Lab: NORMAL
SPECIMEN SOURCE: NORMAL

## 2018-08-29 ENCOUNTER — TELEPHONE (OUTPATIENT)
Dept: SURGERY | Facility: CLINIC | Age: 83
End: 2018-08-29

## 2018-08-29 ENCOUNTER — RADIANT APPOINTMENT (OUTPATIENT)
Dept: ULTRASOUND IMAGING | Facility: CLINIC | Age: 83
End: 2018-08-29
Attending: NURSE PRACTITIONER
Payer: MEDICARE

## 2018-08-29 DIAGNOSIS — R09.89 RIGHT CAROTID BRUIT: ICD-10-CM

## 2018-08-29 NOTE — TELEPHONE ENCOUNTER
Called patient with below carotid US results:      EXAMINATION: US CAROTID BILATERAL, 8/29/2018 3:44 PM      COMPARISON: None.     HISTORY: Right carotid bruit     TECHNIQUE:  Grayscale (B-mode) and duplex and spectral Doppler  ultrasound of the extracranial internal carotid, external carotid,  vertebral artery origins, right brachiocephalic/subclavian and left  subclavian arteries. Velocity measurements obtained with angle  correction at or less than 60 degrees.     FINDINGS:     Right side:      Plaque Morphology: No significant plaque identified.        Proximal CCA: 83/11 cm/sec     Mid CCA: 88/12 cm/sec     Distal CCA: 75/13 cm/sec     Carotid bifurcation: 78/12 cm/sec     External CA: 93/10 cm/sec        Proximal ICA: 58/14 cm/sec     Mid ICA: 80/18 cm/sec     Distal ICA: 80/20 cm/sec        Vertebral Artery: antegrade, 53/9 cm/sec      Innominate artery: 153/18 cm/sec     Proximal subclavian: 136/0 cm/sec     ICA/CCA ratio: 1.07      Left side:      Plaque Morphology: Minimal amount of predominantly echogenic plaque at  the carotid bulb..        CCA origin: 72/11 cm/sec     Proximal CCA: 83/12 cm/sec     Mid CCA: 86/16 cm/sec     Distal CCA: 76/14 cm/sec     Carotid bifurcation: 77/16 cm/sec     External CA: 118/16 cm/sec        Proximal ICA: 79/14 cm/sec     Mid ICA: 116/26 cm/sec     Distal ICA: 95/22 cm/sec        Vertebral Artery: antegrade, 111/21 cm/sec     Subclavian origin: 152 cm/sec     Proximal subclavian: 147 cm/sec     ICA/CCA ratio: 1.53          Impression:     No hemodynamically significant stenosis in either carotid artery.    Questions answered.  Patient appreciated call.

## 2018-09-04 ENCOUNTER — CARE COORDINATION (OUTPATIENT)
Dept: UROLOGY | Facility: CLINIC | Age: 83
End: 2018-09-04

## 2018-09-06 NOTE — PROGRESS NOTES
Pre Op Teaching Flowsheet       Pre and Post op Patient Education  Relevant Diagnosis:  Positive cytology  Surgical procedure:  Cystoscopy, bilateral ureteroscopy , random bladder biopsies, retrograde pyelograms, ureteral washings  Teaching Topic:  Pre and post op teaching  Person Involved in teaching: Dimple Oviedo    Motivation Level:  Asks Questions: Yes  Eager to Learn:  Yes  Cooperative: Yes  Receptive (willing/able to accept information):  Yes    Patient demonstrates understanding of the following:  Date of surgery:  9/10/18  Location of surgery:  [address] [place] [address]  History and Physical and any other testing necessary prior to surgery: Yes  Required time line for completion of History and Physical and any pre-op testing: Yes    Patient demonstrates understanding of the following:  Pre-op bowel prep:  None needed  Pre-op showering/scrub information with PCMX Soap: Yes  Blood thinner medications discussed and when to stop (if applicable):  Yes      Infection Prevention:   Patient demonstrates understanding of the following:  Surgical procedure site care taught: at time of discharge  Signs and symptoms of infection taught:  Yes      Post-op follow-up:  Discussed how to contact the hospital, nurse, and clinic scheduling staff if necessary.    Instructional materials used/given/mailed:  [facility] Booklet, post op teaching sheet, Map, Soap, and arrival/location information.    Surgical instructions packet mailed to patient.     Patient has a  and someone to stay with him for the first 24 hours.

## 2018-09-07 DIAGNOSIS — E05.00 GRAVES DISEASE: Primary | ICD-10-CM

## 2018-09-10 ENCOUNTER — HOSPITAL ENCOUNTER (INPATIENT)
Facility: CLINIC | Age: 83
LOS: 3 days | Discharge: HOME | DRG: 694 | End: 2018-09-14
Attending: EMERGENCY MEDICINE | Admitting: EMERGENCY MEDICINE
Payer: MEDICARE

## 2018-09-10 ENCOUNTER — ANESTHESIA (OUTPATIENT)
Dept: SURGERY | Facility: AMBULATORY SURGERY CENTER | Age: 83
End: 2018-09-10

## 2018-09-10 ENCOUNTER — HOSPITAL ENCOUNTER (OUTPATIENT)
Facility: AMBULATORY SURGERY CENTER | Age: 83
End: 2018-09-10
Attending: UROLOGY
Payer: MEDICARE

## 2018-09-10 ENCOUNTER — SURGERY (OUTPATIENT)
Age: 83
End: 2018-09-10

## 2018-09-10 ENCOUNTER — RADIANT APPOINTMENT (OUTPATIENT)
Dept: RADIOLOGY | Facility: AMBULATORY SURGERY CENTER | Age: 83
End: 2018-09-10
Attending: UROLOGY
Payer: MEDICARE

## 2018-09-10 VITALS
TEMPERATURE: 97.9 F | WEIGHT: 147.1 LBS | OXYGEN SATURATION: 95 % | BODY MASS INDEX: 31.73 KG/M2 | DIASTOLIC BLOOD PRESSURE: 58 MMHG | HEIGHT: 57 IN | SYSTOLIC BLOOD PRESSURE: 130 MMHG | RESPIRATION RATE: 16 BRPM

## 2018-09-10 DIAGNOSIS — R31.0 HEMATURIA, GROSS: Primary | ICD-10-CM

## 2018-09-10 DIAGNOSIS — N17.9 ACUTE KIDNEY INJURY (H): ICD-10-CM

## 2018-09-10 LAB — POTASSIUM SERPL-SCNC: 4.4 MMOL/L (ref 3.4–5.3)

## 2018-09-10 PROCEDURE — 88112 CYTOPATH CELL ENHANCE TECH: CPT | Performed by: UROLOGY

## 2018-09-10 PROCEDURE — 99285 EMERGENCY DEPT VISIT HI MDM: CPT | Mod: 25

## 2018-09-10 PROCEDURE — 51798 US URINE CAPACITY MEASURE: CPT

## 2018-09-10 PROCEDURE — 88305 TISSUE EXAM BY PATHOLOGIST: CPT | Performed by: UROLOGY

## 2018-09-10 PROCEDURE — 96361 HYDRATE IV INFUSION ADD-ON: CPT

## 2018-09-10 PROCEDURE — 96360 HYDRATION IV INFUSION INIT: CPT

## 2018-09-10 PROCEDURE — 51702 INSERT TEMP BLADDER CATH: CPT

## 2018-09-10 PROCEDURE — 99285 EMERGENCY DEPT VISIT HI MDM: CPT | Mod: Z6 | Performed by: EMERGENCY MEDICINE

## 2018-09-10 RX ORDER — ONDANSETRON 4 MG/1
4 TABLET, ORALLY DISINTEGRATING ORAL EVERY 30 MIN PRN
Status: DISCONTINUED | OUTPATIENT
Start: 2018-09-10 | End: 2018-09-11 | Stop reason: HOSPADM

## 2018-09-10 RX ORDER — MEPERIDINE HYDROCHLORIDE 25 MG/ML
12.5 INJECTION INTRAMUSCULAR; INTRAVENOUS; SUBCUTANEOUS
Status: DISCONTINUED | OUTPATIENT
Start: 2018-09-10 | End: 2018-09-11 | Stop reason: HOSPADM

## 2018-09-10 RX ORDER — PROPOFOL 10 MG/ML
INJECTION, EMULSION INTRAVENOUS CONTINUOUS PRN
Status: DISCONTINUED | OUTPATIENT
Start: 2018-09-10 | End: 2018-09-10

## 2018-09-10 RX ORDER — DEXAMETHASONE SODIUM PHOSPHATE 4 MG/ML
INJECTION, SOLUTION INTRA-ARTICULAR; INTRALESIONAL; INTRAMUSCULAR; INTRAVENOUS; SOFT TISSUE PRN
Status: DISCONTINUED | OUTPATIENT
Start: 2018-09-10 | End: 2018-09-10

## 2018-09-10 RX ORDER — ACETAMINOPHEN 325 MG/1
975 TABLET ORAL ONCE
Status: COMPLETED | OUTPATIENT
Start: 2018-09-10 | End: 2018-09-10

## 2018-09-10 RX ORDER — ONDANSETRON 2 MG/ML
INJECTION INTRAMUSCULAR; INTRAVENOUS PRN
Status: DISCONTINUED | OUTPATIENT
Start: 2018-09-10 | End: 2018-09-10

## 2018-09-10 RX ORDER — EPHEDRINE SULFATE 50 MG/ML
INJECTION, SOLUTION INTRAMUSCULAR; INTRAVENOUS; SUBCUTANEOUS PRN
Status: DISCONTINUED | OUTPATIENT
Start: 2018-09-10 | End: 2018-09-10

## 2018-09-10 RX ORDER — ONDANSETRON 2 MG/ML
4 INJECTION INTRAMUSCULAR; INTRAVENOUS EVERY 30 MIN PRN
Status: DISCONTINUED | OUTPATIENT
Start: 2018-09-10 | End: 2018-09-11 | Stop reason: HOSPADM

## 2018-09-10 RX ORDER — LIDOCAINE 40 MG/G
CREAM TOPICAL
Status: DISCONTINUED | OUTPATIENT
Start: 2018-09-10 | End: 2018-09-10 | Stop reason: HOSPADM

## 2018-09-10 RX ORDER — FENTANYL CITRATE 50 UG/ML
25-50 INJECTION, SOLUTION INTRAMUSCULAR; INTRAVENOUS
Status: DISCONTINUED | OUTPATIENT
Start: 2018-09-10 | End: 2018-09-10 | Stop reason: HOSPADM

## 2018-09-10 RX ORDER — SODIUM CHLORIDE, SODIUM LACTATE, POTASSIUM CHLORIDE, CALCIUM CHLORIDE 600; 310; 30; 20 MG/100ML; MG/100ML; MG/100ML; MG/100ML
INJECTION, SOLUTION INTRAVENOUS CONTINUOUS
Status: DISCONTINUED | OUTPATIENT
Start: 2018-09-10 | End: 2018-09-10 | Stop reason: HOSPADM

## 2018-09-10 RX ORDER — NALOXONE HYDROCHLORIDE 0.4 MG/ML
.1-.4 INJECTION, SOLUTION INTRAMUSCULAR; INTRAVENOUS; SUBCUTANEOUS
Status: DISCONTINUED | OUTPATIENT
Start: 2018-09-10 | End: 2018-09-11 | Stop reason: HOSPADM

## 2018-09-10 RX ORDER — LIDOCAINE HYDROCHLORIDE 20 MG/ML
INJECTION, SOLUTION INFILTRATION; PERINEURAL PRN
Status: DISCONTINUED | OUTPATIENT
Start: 2018-09-10 | End: 2018-09-10

## 2018-09-10 RX ORDER — PROPOFOL 10 MG/ML
INJECTION, EMULSION INTRAVENOUS PRN
Status: DISCONTINUED | OUTPATIENT
Start: 2018-09-10 | End: 2018-09-10

## 2018-09-10 RX ORDER — FENTANYL CITRATE 50 UG/ML
INJECTION, SOLUTION INTRAMUSCULAR; INTRAVENOUS PRN
Status: DISCONTINUED | OUTPATIENT
Start: 2018-09-10 | End: 2018-09-10

## 2018-09-10 RX ORDER — SODIUM CHLORIDE, SODIUM LACTATE, POTASSIUM CHLORIDE, CALCIUM CHLORIDE 600; 310; 30; 20 MG/100ML; MG/100ML; MG/100ML; MG/100ML
INJECTION, SOLUTION INTRAVENOUS CONTINUOUS
Status: DISCONTINUED | OUTPATIENT
Start: 2018-09-10 | End: 2018-09-11 | Stop reason: HOSPADM

## 2018-09-10 RX ADMIN — EPHEDRINE SULFATE 10 MG: 50 INJECTION, SOLUTION INTRAMUSCULAR; INTRAVENOUS; SUBCUTANEOUS at 09:07

## 2018-09-10 RX ADMIN — Medication 200 MCG: at 09:39

## 2018-09-10 RX ADMIN — Medication 100 MCG: at 10:02

## 2018-09-10 RX ADMIN — EPHEDRINE SULFATE 10 MG: 50 INJECTION, SOLUTION INTRAMUSCULAR; INTRAVENOUS; SUBCUTANEOUS at 09:23

## 2018-09-10 RX ADMIN — FENTANYL CITRATE 50 MCG: 50 INJECTION, SOLUTION INTRAMUSCULAR; INTRAVENOUS at 09:23

## 2018-09-10 RX ADMIN — PROPOFOL: 10 INJECTION, EMULSION INTRAVENOUS at 10:42

## 2018-09-10 RX ADMIN — SODIUM CHLORIDE, SODIUM LACTATE, POTASSIUM CHLORIDE, CALCIUM CHLORIDE: 600; 310; 30; 20 INJECTION, SOLUTION INTRAVENOUS at 10:39

## 2018-09-10 RX ADMIN — LIDOCAINE HYDROCHLORIDE 80 MG: 20 INJECTION, SOLUTION INFILTRATION; PERINEURAL at 09:02

## 2018-09-10 RX ADMIN — PROPOFOL 20 MG: 10 INJECTION, EMULSION INTRAVENOUS at 10:41

## 2018-09-10 RX ADMIN — FENTANYL CITRATE 25 MCG: 50 INJECTION, SOLUTION INTRAMUSCULAR; INTRAVENOUS at 11:49

## 2018-09-10 RX ADMIN — PROPOFOL 150 MCG/KG/MIN: 10 INJECTION, EMULSION INTRAVENOUS at 09:02

## 2018-09-10 RX ADMIN — DEXAMETHASONE SODIUM PHOSPHATE 4 MG: 4 INJECTION, SOLUTION INTRA-ARTICULAR; INTRALESIONAL; INTRAMUSCULAR; INTRAVENOUS; SOFT TISSUE at 09:10

## 2018-09-10 RX ADMIN — ACETAMINOPHEN 975 MG: 325 TABLET ORAL at 08:22

## 2018-09-10 RX ADMIN — ONDANSETRON 4 MG: 2 INJECTION INTRAMUSCULAR; INTRAVENOUS at 11:41

## 2018-09-10 RX ADMIN — FENTANYL CITRATE 25 MCG: 50 INJECTION, SOLUTION INTRAMUSCULAR; INTRAVENOUS at 11:54

## 2018-09-10 RX ADMIN — PROPOFOL 120 MG: 10 INJECTION, EMULSION INTRAVENOUS at 09:02

## 2018-09-10 RX ADMIN — FENTANYL CITRATE 50 MCG: 50 INJECTION, SOLUTION INTRAMUSCULAR; INTRAVENOUS at 09:08

## 2018-09-10 RX ADMIN — PROPOFOL 50 MG: 10 INJECTION, EMULSION INTRAVENOUS at 09:28

## 2018-09-10 RX ADMIN — Medication 100 MCG: at 09:23

## 2018-09-10 RX ADMIN — Medication 100 MCG: at 09:52

## 2018-09-10 RX ADMIN — SODIUM CHLORIDE, SODIUM LACTATE, POTASSIUM CHLORIDE, CALCIUM CHLORIDE: 600; 310; 30; 20 INJECTION, SOLUTION INTRAVENOUS at 08:22

## 2018-09-10 RX ADMIN — EPHEDRINE SULFATE 5 MG: 50 INJECTION, SOLUTION INTRAMUSCULAR; INTRAVENOUS; SUBCUTANEOUS at 09:39

## 2018-09-10 RX ADMIN — ONDANSETRON 4 MG: 2 INJECTION INTRAMUSCULAR; INTRAVENOUS at 09:10

## 2018-09-10 RX ADMIN — PROPOFOL: 10 INJECTION, EMULSION INTRAVENOUS at 09:49

## 2018-09-10 ASSESSMENT — ENCOUNTER SYMPTOMS
DIFFICULTY URINATING: 1
HEMATURIA: 1
FEVER: 0

## 2018-09-10 NOTE — DISCHARGE INSTRUCTIONS
[facility]  Home Care Following Anesthesia  For 24 hours after surgery:  1. Get plenty of rest.  A responsible adult must stay with you for at least 24 hours after you leave the surgery center.  2. Do not drive or use heavy equipment.  If you have weakness or tingling, don't drive or use heavy equipment until this feeling goes away.   3. Do not drink alcohol.   4. Avoid strenuous or risky activities.  Ask for help when climbing stairs.  5. You may feel lightheaded.  IF so, sit for a few minutes before standing.  Have someone help you get up.   6. If you have nausea (feel sick to your stomach): Drink only clear liquids such as apple juice, ginger ale, broth or 7-Up.  Rest may also help.  Be sure to drink enough fluids.  Move to a regular diet as you feel able.   7. You may have a slight fever.  Call the doctor if your fever is over 100 F (37.7 C) (taken under the tongue) or lasts longer than 24 hours.  8. You may have a dry mouth, a sore throat, muscle aches or trouble sleeping. These should go away after 24 hours.  9. Do not make important or legal decisions.               Tips for taking pain medications  To get the best pain relief possible, remember these points:    Take pain medications as directed, before pain becomes severe.    Pain medication can upset your stomach: taking it with food may help.    Constipation is a common side effect of pain medication. Drink plenty of  fluids.    Eat foods high in fiber. Take a stool softener if recommended by your doctor or pharmacist.    Do not drink alcohol, drive or operate machinery while taking pain medications.    Ask about other ways to control pain, such as with heat, ice or relaxation.    Tylenol/Acetaminophen Consumption  To help encourage the safe use of acetaminophen, the makers of TYLENOL  have lowered the maximum daily dose for single-ingredient Extra Strength TYLENOL  (acetaminophen) products sold in the [place] from 8  pills per day (4,000 mg) to 6 pills per day (3,000 mg). The dosing interval has also changed from 2 pills every 4-6 hours to 2 pills every 6 hours.    If you feel your pain relief is insufficient, you may take Tylenol/Acetaminophen in addition to your narcotic pain medication.     Be careful not to exceed 3,000 mg of Tylenol/Acetaminophen in a 24 hour period from all sources.    If you are taking extra strength Tylenol/acetaminophen (500 mg), the maximum dose is 6 tablets in 24 hours.    If you are taking regular strength acetaminophen (325 mg), the maximum dose is 9 tablets in 24 hours.    Call a doctor for any of the followin. Signs of infection (fever, growing tenderness at the surgery site, a large amount of drainage or bleeding, severe pain, foul-smelling drainage, redness, swelling).  2. It has been over 8 to 10 hours since surgery and you are still not able to urinate (pass water).  3. Headache for over 24 hours.  4. Numbness, tingling or weakness the day after surgery (if you had spinal anesthesia).  Your doctor is:       Dr. Davidson [facility], Prostate and Urology: [phone]               Or dial [phone] and ask for the resident on call for:  Prostate Urology  For emergency care, call the:  [place]:  [phone] (TTY for hearing impaired: [phone])

## 2018-09-10 NOTE — BRIEF OP NOTE
[facility]    Brief Operative Note    Pre-operative diagnosis: Positive Cytology  Post-operative diagnosis Same  Procedure: Procedure(s):  Cystoscopy, Bilateral Ureteroscopy, Bladder Biopsies, Retrogram Pyelograms, Ureteral Washings and brushings, cysview - Wound Class: II-Clean Contaminated  Surgeon: Surgeon(s) and Role:     * Stuart King MD - Primary  Anesthesia: General   Estimated blood loss: Minimal  Drains: None  Specimens:   ID Type Source Tests Collected by Time Destination   1 : Left upper tract washings Washings Ureter, Left CYTOLOGY NON GYN, FISH BLADDER CANCER Stuart King MD 9/10/2018  9:15 AM    2 : Right upper tract washings Washings Ureter, Right CYTOLOGY NON GYN, FISH BLADDER CANCER Stuart King MD 9/10/2018  9:16 AM    3 : Right ureteral brushing Brushing Ureter, Right CYTOLOGY NON GYN, FISH BLADDER CANCER Stuart King MD 9/10/2018 10:29 AM    A : Dome lesion  Tissue Bladder SURGICAL PATHOLOGY EXAM Stuart King MD 9/10/2018 10:46 AM    B : Random bladder biopsy-bladder base right Tissue Bladder SURGICAL PATHOLOGY EXAM Stuart King MD 9/10/2018 10:53 AM    C : Random bladder biopsy-bladder base left Tissue Bladder SURGICAL PATHOLOGY EXAM Stuart King MD 9/10/2018 10:53 AM    D : Random bladder biopsy-trigone Tissue Bladder SURGICAL PATHOLOGY EXAM WeightStuart MD 9/10/2018 10:55 AM      Findings:   tortuous proximal right ureter. Moderate to severe right hydronephrosis. No suspicious areas on bladder seen on blue light cystoscopy.  Complications: None.  Implants: None.

## 2018-09-10 NOTE — IP AVS SNAPSHOT
[facility]    [address] [place] [address]    Phone:  [phone]    Fax:  [phone]                                       After Visit Summary   9/10/2018    Dimple Oviedo    MRN: [id number]           After Visit Summary Signature Page     I have received my discharge instructions, and my questions have been answered. I have discussed any challenges I see with this plan with the nurse or doctor.    ..........................................................................................................................................  Patient/Patient Representative Signature      ..........................................................................................................................................  Patient Representative Print Name and Relationship to Patient    ..................................................               ................................................  Date                                            Time    ..........................................................................................................................................  Reviewed by Signature/Title    ...................................................              ..............................................  Date                                                            Time          22EPIC Rev 08/18

## 2018-09-10 NOTE — ANESTHESIA POSTPROCEDURE EVALUATION
Patient: Dimple Oviedo    Procedure(s):  Cystoscopy, Bilateral Ureteroscopy, Bladder Biopsies, Retrogram Pyelograms, Ureteral Washings and brushings, cysview - Wound Class: II-Clean Contaminated    Diagnosis:Positive Cytology  Diagnosis Additional Information: No value filed.    Anesthesia Type:  General, LMA    Note:  Anesthesia Post Evaluation    Patient location during evaluation: PACU  Patient participation: Able to fully participate in evaluation  Level of consciousness: awake and alert  Pain management: adequate  Airway patency: patent  Cardiovascular status: acceptable  Respiratory status: acceptable  Hydration status: acceptable  PONV: none     Anesthetic complications: None          Last vitals:  Vitals:    09/10/18 1200 09/10/18 1206 09/10/18 1240   BP: 150/63 155/63 130/58   Resp:  19 16   Temp:   36.6  C (97.9  F)   SpO2:  94% 95%         Electronically Signed By: Derrell Burton MD  September 10, 2018

## 2018-09-10 NOTE — ANESTHESIA CARE TRANSFER NOTE
Patient: Dimple Oviedo    Procedure(s):  Cystoscopy, Bilateral Ureteroscopy, Bladder Biopsies, Retrogram Pyelograms, Ureteral Washings and brushings, cysview - Wound Class: II-Clean Contaminated    Diagnosis: Positive Cytology  Diagnosis Additional Information: No value filed.    Anesthesia Type:   General, LMA     Note:  Airway :Face Mask  Patient transferred to:PACU  Comments: Uneventful transport to PACU   Report to RN  Exchanging well  Pt responds appropriately to command  IV patent  Lips/teeth/dentition as preop status  Questions answered  /68  HR 69 nsr  TAT 97.3  RR 14  Sat 100%Handoff Report: Identifed the Patient, Identified the Reponsible Provider, Reviewed the pertinent medical history, Discussed the surgical course, Reviewed Intra-OP anesthesia mangement and issues during anesthesia, Set expectations for post-procedure period and Allowed opportunity for questions and acknowledgement of understanding      Vitals: (Last set prior to Anesthesia Care Transfer)    CRNA VITALS  9/10/2018 1046 - 9/10/2018 1121      9/10/2018             Pulse: 68    SpO2: 99 %    Resp Rate (observed): 17                Electronically Signed By: NELSON REIS CRNA  September 10, 2018  11:21 AM

## 2018-09-10 NOTE — IP AVS SNAPSHOT
[address] [address]    Phone:  [phone]                                       After Visit Summary   9/10/2018    Dimple Oviedo    MRN: [id number]           After Visit Summary Signature Page     I have received my discharge instructions, and my questions have been answered. I have discussed any challenges I see with this plan with the nurse or doctor.    ..........................................................................................................................................  Patient/Patient Representative Signature      ..........................................................................................................................................  Patient Representative Print Name and Relationship to Patient    ..................................................               ................................................  Date                                   Time    ..........................................................................................................................................  Reviewed by Signature/Title    ...................................................              ..............................................  Date                                               Time          22EPIC Rev 08/18

## 2018-09-10 NOTE — IP AVS SNAPSHOT
MRN:[id number]                      After Visit Summary   9/10/2018    Dimple Oviedo    MRN: [id number]           Thank you!     Thank you for choosing [facility] for your care. Our goal is always to provide you with excellent care. Hearing back from our patients is one way we can continue to improve our services. Please take a few minutes to complete the written survey that you may receive in the mail after you visit with us. Thank you!        Patient Information     Date Of Birth          6/23/1933        About your hospital stay     You were admitted on:  September 10, 2018 [facility]    You were discharged on:  September 10, 2018       Who to Call     For medical emergencies, please call 911.  For non-urgent questions about your medical care, please call your primary care provider or clinic, [phone]  For questions related to your surgery, please call your surgery clinic        Attending Provider     Provider Specialty    Weight, Stuart Blackmon MD Urology       Primary Care Provider Office Phone # Fax #    Pop Antonino Zapien [phone]      After Care Instructions     Discharge Instructions       [facility] - Same-Day Surgery   Adult Discharge Orders & Instructions     For 24 hours after surgery:    Some amount of blood in your urine is expected. Let us know if it gets worse.   Get plenty of rest.  A responsible adult must stay with you for at least 24 hours after you leave the hospital.   Do not drive or use heavy equipment.  If you have weakness or tingling, don't drive or use heavy equipment until this feeling goes away.  Do not drink alcohol for a minimum of 24 hours after surgery.    Avoid strenuous or risky activities.  Ask for help when climbing stairs.   You may feel lightheaded.  IF so, sit for a few minutes before standing.  Have someone help you get up.      If you have nausea (feel sick to your stomach): Drink only clear liquids such as apple juice, ginger ale, broth or 7-Up.  Rest may also help.  Be sure to drink enough fluids. Move to a regular diet as you feel able.    You may have a slight fever. Call the doctor if your fever is over 100 F (37.7 C) (taken under the tongue) or lasts longer than 24 hours.    You may have a dry mouth, a sore throat, muscle aches or trouble sleeping.  These should go away after 24 hours.    Do not make important or legal decisions.     Call your doctor for any of the followin.  Signs of infection (fever, growing tenderness at the surgery site, a large amount of drainage or bleeding, severe pain, foul-smelling drainage, redness, swelling).  2. It has been over 8 to 10 hours since surgery and you are still not able to urinate (pass water).  3.  Headache for over 24 hours.  4. Temperature > 101F  5. Any other questions or concerns.     Follow up with Dr King as previously scheduled    You need to get a CT scan before your next appointment to evaluate your kidneys.                  Your next 10 appointments already scheduled     Sep 27, 2018  1:30 PM CDT   (Arrive by 1:15 PM)   NEW ARRHYTHMIA with Butch Griffith MD   [facility] ([facility])    [address] [place] [address]  [address]  [facility] [id number] [phone]            Oct 03, 2018 12:30 PM CDT   (Arrive by 12:15 PM)   Lymphedema Treatment with Crista Jain PT   [facility] ([facility])    [address]  [address]  [facility] [id number] [phone]            2018  1:30 PM CST   US THYROID with UCUS2   [facility] ([facility])    [address] [place] [address] [address]  1st Floor  [facility] [id number] [phone]           How do I prepare for my exam? (Food and drink instructions) No Food and Drink Restrictions.  How do I prepare for  my exam? (Other instructions) You do not need to do anything special to prepare for your exam.  What should I wear: Wear comfortable clothes.  How long does the exam take: Most ultrasounds take 30 to 60 minutes.  What should I bring: Bring a list of your medicines, including vitamins, minerals and over-the-counter drugs. It is safest to leave personal items at home.  Do I need a :  No  is needed.  What do I need to tell my doctor: Tell your doctor about any allergies you may have.  What should I do after the exam: No restrictions, You may resume normal activities.  What is this test: An ultrasound uses sound waves to make pictures of the body. Sound waves do not cause pain. The only discomfort may be the pressure of the wand against your skin or full bladder.  Who should I call with questions: If you have any questions, please call the Imaging Department where you will have your exam. Directions, parking instructions, and other information is available on our website, Personal/imaging.            Nov 26, 2018  2:15 PM CST   LAB with [facility] ([facility])    [address] [place] [id number] [phone]           Please do not eat 10-12 hours before your appointment if you are coming in fasting for labs on lipids, cholesterol, or glucose (sugar). This does not apply to pregnant women. Water, hot tea and black coffee (with nothing added) are okay. Do not drink other fluids, diet soda or chew gum.            Dec 04, 2018  1:30 PM CST   (Arrive by 1:15 PM)   RETURN ENDOCRINE with Dhara Meza MD   [facility] ([facility])    [address] [place] [id number] [phone]              Future tests that were ordered for you     CT Urogram wo & w Contrast                 Further instructions from your care team       [facility]  Home  Care Following Anesthesia  For 24 hours after surgery:  1. Get plenty of rest.  A responsible adult must stay with you for at least 24 hours after you leave the surgery center.  2. Do not drive or use heavy equipment.  If you have weakness or tingling, don't drive or use heavy equipment until this feeling goes away.   3. Do not drink alcohol.   4. Avoid strenuous or risky activities.  Ask for help when climbing stairs.  5. You may feel lightheaded.  IF so, sit for a few minutes before standing.  Have someone help you get up.   6. If you have nausea (feel sick to your stomach): Drink only clear liquids such as apple juice, ginger ale, broth or 7-Up.  Rest may also help.  Be sure to drink enough fluids.  Move to a regular diet as you feel able.   7. You may have a slight fever.  Call the doctor if your fever is over 100 F (37.7 C) (taken under the tongue) or lasts longer than 24 hours.  8. You may have a dry mouth, a sore throat, muscle aches or trouble sleeping. These should go away after 24 hours.  9. Do not make important or legal decisions.               Tips for taking pain medications  To get the best pain relief possible, remember these points:    Take pain medications as directed, before pain becomes severe.    Pain medication can upset your stomach: taking it with food may help.    Constipation is a common side effect of pain medication. Drink plenty of  fluids.    Eat foods high in fiber. Take a stool softener if recommended by your doctor or pharmacist.    Do not drink alcohol, drive or operate machinery while taking pain medications.    Ask about other ways to control pain, such as with heat, ice or relaxation.    Tylenol/Acetaminophen Consumption  To help encourage the safe use of acetaminophen, the makers of TYLENOL  have lowered the maximum daily dose for single-ingredient Extra Strength TYLENOL  (acetaminophen) products sold in the [place] from 8 pills per day (4,000 mg) to 6 pills per day (3,000 mg). The  dosing interval has also changed from 2 pills every 4-6 hours to 2 pills every 6 hours.    If you feel your pain relief is insufficient, you may take Tylenol/Acetaminophen in addition to your narcotic pain medication.     Be careful not to exceed 3,000 mg of Tylenol/Acetaminophen in a 24 hour period from all sources.    If you are taking extra strength Tylenol/acetaminophen (500 mg), the maximum dose is 6 tablets in 24 hours.    If you are taking regular strength acetaminophen (325 mg), the maximum dose is 9 tablets in 24 hours.    Call a doctor for any of the followin. Signs of infection (fever, growing tenderness at the surgery site, a large amount of drainage or bleeding, severe pain, foul-smelling drainage, redness, swelling).  2. It has been over 8 to 10 hours since surgery and you are still not able to urinate (pass water).  3. Headache for over 24 hours.  4. Numbness, tingling or weakness the day after surgery (if you had spinal anesthesia).  Your doctor is:       Dr. Stuart King, Prostate and Urology: [phone]               Or dial [phone] and ask for the resident on call for:  Prostate Urology  For emergency care, call the:  [facility]:  [phone] (TTY for hearing impaired: [phone])                Pending Results     Date and Time Order Name Status Description    9/10/2018 1050 Surgical pathology exam In process     9/10/2018 1031 FISH BLADDER CANCER In process     9/10/2018 1031 Cytology non gyn In process     9/10/2018 0938 FISH BLADDER CANCER In process     9/10/2018 0938 FISH BLADDER CANCER In process     9/10/2018 0938 Cytology non gyn In process     9/10/2018 0938 Cytology non gyn In process             Admission Information     Date & Time Provider Department Dept. Phone    9/10/2018 Stuart King MD [facility] [phone]      Your Vitals Were     Blood Pressure Temperature Respirations Height Weight Pulse  "Oximetry    155/63 97.4  F (36.3  C) (Temporal) 19 1.448 m (4' 9\") 66.7 kg (147 lb 1.6 oz) 94%    BMI (Body Mass Index)                   31.83 kg/m2           [employer] Information     [employer] gives you secure access to your electronic health record. If you see a primary care provider, you can also send messages to your care team and make appointments. If you have questions, please call your primary care clinic.  If you do not have a primary care provider, please call [phone] and they will assist you.      [employer] is an electronic gateway that provides easy, online access to your medical records. With [employer], you can request a clinic appointment, read your test results, renew a prescription or communicate with your care team.     To access your existing account, please contact your [facility] or call [phone] for assistance.        Care EveryWhere ID     This is your Care EveryWhere ID. This could be used by other organizations to access your [facility] medical records  [id number]        Equal Access to Services     GUNNER RODRIGUEZ : Hadii shameka ufnko Sogabo, waaxda luqadaha, qaybta kaalmacristian adedc, maldonado seay . So [facility] [phone].    ATENCIÓN: Si habla español, tiene a sierra disposición servicios gratuitos de asistencia lingüística. Llame al [phone].    We comply with applicable federal civil rights laws and [place] laws. We do not discriminate on the basis of race, color, national origin, age, disability, sex, sexual orientation, or gender identity.               Review of your medicines      CONTINUE these medicines which may have CHANGED, or have new prescriptions. If we are uncertain of the size of tablets/capsules you have at home, strength may be listed as something that might have changed.        Dose / Directions    alendronate 70 MG tablet   Commonly known as:  FOSAMAX   This may have changed:  See the new instructions.   Used " "for:  High risk medication use, Osteopenia        TAKE 1 TABLET EVERY 7 DAYS AT LEAST 60 MINUTES BEFORE BREAKFAST AS DIRECTED \"SEE PACKAGE FOR ADDITIONAL INSTRUCTIONS\"   Quantity:  12 tablet   Refills:  0       irbesartan 300 MG tablet   Commonly known as:  AVAPRO   This may have changed:    - how much to take  - how to take this  - when to take this  - additional instructions   Used for:  Essential hypertension with goal blood pressure less than 140/90        TAKE 1 TABLET EVERY DAY   Quantity:  90 tablet   Refills:  2       methimazole 5 MG tablet   Commonly known as:  TAPAZOLE   This may have changed:  when to take this   Used for:  Nontoxic multinodular goiter        Dose:  5 mg   Take 1 tablet (5 mg) by mouth daily   Quantity:  90 tablet   Refills:  1       omeprazole 20 MG CR capsule   Commonly known as:  priLOSEC   This may have changed:  when to take this   Used for:  Gastroesophageal reflux disease without esophagitis        Dose:  20 mg   Take 1 capsule (20 mg) by mouth daily   Quantity:  180 capsule   Refills:  3       propranolol 60 MG 24 hr capsule   Commonly known as:  INDERAL LA   This may have changed:  when to take this   Used for:  Nontoxic multinodular goiter        Dose:  60 mg   Take 1 capsule (60 mg) by mouth daily   Quantity:  90 capsule   Refills:  1       rOPINIRole 0.25 MG tablet   Commonly known as:  REQUIP   This may have changed:  See the new instructions.   Used for:  Restless legs syndrome        TAKE 1 TABLET(0.25 MG) BY MOUTH EVERY NIGHT AS NEEDED   Quantity:  90 tablet   Refills:  0       sertraline 100 MG tablet   Commonly known as:  ZOLOFT   This may have changed:  when to take this   Used for:  THERON (generalized anxiety disorder)        Dose:  100 mg   Take 1 tablet (100 mg) by mouth daily   Quantity:  90 tablet   Refills:  1       traZODone 50 MG tablet   Commonly known as:  DESYREL   This may have changed:  See the new instructions.   Used for:  Insomnia, unspecified type        " TAKE 1 TABLET(50 MG) BY MOUTH EVERY NIGHT AS NEEDED FOR SLEEP   Quantity:  90 tablet   Refills:  1         CONTINUE these medicines which have NOT CHANGED        Dose / Directions    ASPIRIN PO        Dose:  81 mg   Take 81 mg by mouth At Bedtime   Refills:  0       CALCIUM + D PO        Take by mouth 2 times daily   Refills:  0       colestipol 1 g tablet   Commonly known as:  COLESTID        1 TABLET ( 1 g)  BID  - TAKE OTHER MEDS 1 HOUR BEFORE OR 4 HOURS AFTER COLESID   Refills:  2       cycloSPORINE 0.05 % ophthalmic emulsion   Commonly known as:  RESTASIS        Dose:  1 drop   Place 1 drop into both eyes 2 times daily   Refills:  0       Fish Oil 500 MG Caps        Dose:  1 capsule   Take 1 capsule by mouth 2 times daily   Refills:  0       furosemide 20 MG tablet   Commonly known as:  LASIX   Used for:  Stasis edema of both lower extremities, (HFpEF) heart failure with preserved ejection fraction (H)        Dose:  20 mg   Take 1 tablet (20 mg) by mouth every morning   Refills:  0       IRON SUPPLEMENT PO        Dose:  325 mg   Take 325 mg by mouth every morning   Refills:  0       lovastatin 40 MG tablet   Commonly known as:  MEVACOR   Used for:  Hyperlipidemia LDL goal <130        TAKE 1 TABLET AT BEDTIME (HYPERLIPIDEMIA LDL GOAL BELOW 130)   Quantity:  90 tablet   Refills:  2       Melatonin 10 MG Tabs tablet        Dose:  10 mg   Take 10 mg by mouth as needed for sleep   Refills:  0       nitroGLYcerin 0.4 MG sublingual tablet   Commonly known as:  NITROSTAT   Used for:  Elevated troponin        For chest pain place 1 tablet under the tongue every 5 minutes for 3 doses. If symptoms persist 5 minutes after 1st dose call 911.   Quantity:  25 tablet   Refills:  3       order for DME   Used for:  Lymphedema of both lower extremities        Equipment being ordered: Knee high compression for B LE 20-30mmHg, Velcro units for night time (consider hybrid sock as foot swelling is less than leg swelling), Donning  "device   Quantity:  1 each   Refills:  2       PROBIOTIC ADVANCED PO        Take by mouth every morning   Refills:  0       SYSTANE 0.4-0.3 % Soln ophthalmic solution   Generic drug:  polyethylene glycol 0.4%- propylene glycol 0.3%        Dose:  1 drop   Place 1 drop into both eyes 2 times daily   Refills:  0       TYLENOL ARTHRITIS PAIN PO        Dose:  650 mg   Take 650 mg by mouth as needed   Refills:  0       VALIUM PO        Dose:  2 mg   Take 2 mg by mouth as needed for anxiety   Refills:  0                Protect others around you: Learn how to safely use, store and throw away your medicines at www.disposemymeds.org.             Medication List: This is a list of all your medications and when to take them. Check marks below indicate your daily home schedule. Keep this list as a reference.      Medications           Morning Afternoon Evening Bedtime As Needed    alendronate 70 MG tablet   Commonly known as:  FOSAMAX   TAKE 1 TABLET EVERY 7 DAYS AT LEAST 60 MINUTES BEFORE BREAKFAST AS DIRECTED \"SEE PACKAGE FOR ADDITIONAL INSTRUCTIONS\"                                ASPIRIN PO   Take 81 mg by mouth At Bedtime                                CALCIUM + D PO   Take by mouth 2 times daily                                colestipol 1 g tablet   Commonly known as:  COLESTID   1 TABLET ( 1 g)  BID  - TAKE OTHER MEDS 1 HOUR BEFORE OR 4 HOURS AFTER COLESID                                cycloSPORINE 0.05 % ophthalmic emulsion   Commonly known as:  RESTASIS   Place 1 drop into both eyes 2 times daily                                Fish Oil 500 MG Caps   Take 1 capsule by mouth 2 times daily                                furosemide 20 MG tablet   Commonly known as:  LASIX   Take 1 tablet (20 mg) by mouth every morning                                irbesartan 300 MG tablet   Commonly known as:  AVAPRO   TAKE 1 TABLET EVERY DAY                                IRON SUPPLEMENT PO   Take 325 mg by mouth every morning             "                    lovastatin 40 MG tablet   Commonly known as:  MEVACOR   TAKE 1 TABLET AT BEDTIME (HYPERLIPIDEMIA LDL GOAL BELOW 130)                                Melatonin 10 MG Tabs tablet   Take 10 mg by mouth as needed for sleep                                methimazole 5 MG tablet   Commonly known as:  TAPAZOLE   Take 1 tablet (5 mg) by mouth daily                                nitroGLYcerin 0.4 MG sublingual tablet   Commonly known as:  NITROSTAT   For chest pain place 1 tablet under the tongue every 5 minutes for 3 doses. If symptoms persist 5 minutes after 1st dose call 911.                                omeprazole 20 MG CR capsule   Commonly known as:  priLOSEC   Take 1 capsule (20 mg) by mouth daily                                order for DME   Equipment being ordered: Knee high compression for B LE 20-30mmHg, Velcro units for night time (consider hybrid sock as foot swelling is less than leg swelling), Donning device                                PROBIOTIC ADVANCED PO   Take by mouth every morning                                propranolol 60 MG 24 hr capsule   Commonly known as:  INDERAL LA   Take 1 capsule (60 mg) by mouth daily                                rOPINIRole 0.25 MG tablet   Commonly known as:  REQUIP   TAKE 1 TABLET(0.25 MG) BY MOUTH EVERY NIGHT AS NEEDED                                sertraline 100 MG tablet   Commonly known as:  ZOLOFT   Take 1 tablet (100 mg) by mouth daily                                SYSTANE 0.4-0.3 % Soln ophthalmic solution   Place 1 drop into both eyes 2 times daily   Generic drug:  polyethylene glycol 0.4%- propylene glycol 0.3%                                traZODone 50 MG tablet   Commonly known as:  DESYREL   TAKE 1 TABLET(50 MG) BY MOUTH EVERY NIGHT AS NEEDED FOR SLEEP                                TYLENOL ARTHRITIS PAIN PO   Take 650 mg by mouth as needed                                VALIUM PO   Take 2 mg by mouth as needed for anxiety

## 2018-09-10 NOTE — OP NOTE
OPERATIVE REPORT    PREOPERATIVE DIAGNOSIS: positive cytology    POSTOPERATIVE DIAGNOSIS: negative cystoscopy    PROCEDURES PERFORMED:   1. White and blue light cystourethroscopy following intravesical administration of hexaminolevulinate HCl (Cysview )  2.  Bilateral retrograde pyelograms  3.  Bilateral ureteroscopy  4.  Random bladder biopsies    STAFF SURGEON: Stuart Reyes MD    RESIDENT: Dayna Segundo MD    ANESTHESIA: General    ESTIMATED BLOOD LOSS: 2 mL.     DRAINS: None    OPERATIVE INDICATIONS:    Ms. Oviedo is a 85-year-old lady with history of persistently positive cytology and fish. Because of this she elected to undergo a blue-light cystoscopy following intravesical administration of hexaminolevulinate HCl (Cysview ) for biopsy and maximal fulguration, after a discussion of all risks, benefits, and alternatives.    DESCRIPTION OF PROCEDURE:   After verification of informed consent was obtained, the patient was brought to the operating room, and moved to the operating table. After adequate anesthesia was induced, the patient was repositioned in dorsal lithotomy position and prepped and draped in the usual sterile fashion. A formal timeout was performed to confirm the correct patient, procedure and operative site. 60 minutes prior to the start of the procedure, 50 ml of hexaminolevulinate HCl (Cysview ) solution were instilled through a one time catheterization with a 14 Fr straight catheter.    A 22-Belarusian rigid cystoscope was inserted into a well lubricated urethra. We began by performing a white light cystourethroscopy. The urethra was normal.The ureteral orifices were in their orthotopic positions bilaterally There were no intravesical stones or diverticuli and the bladder was no trabeculated.     We switched to the blue light and identified a small area of interest in the dome of the bladder.  We then proceeded to cannulate the right ureteral orifice with a 5 Belarusian open-ended catheter with the  help of a sensor wire.  Retrograde pyelogram was performed which revealed moderate hydronephrosis.  Ureteral washings were sent for cytology and FISH. We then used a flexible ureteroscope to examine the ureter, which was notable for a sharp turn in the proximal ureter.  We noted significant hydronephrosis in both the upper and lower poles.  No obvious tumor or erythema was seen in the right kidney, therefore no biopsies were taken.  However, we did a brush biopsy of a inflamed area in the proximal ureter.    We then moved onto the left side.  Similarly, the left UO was cannulated using a sensor wire and a 5 Armenian open-ended catheter.  Retrograde pyelogram did not demonstrate any hydronephrosis or filling defects.  Ureteroscopy was performed which revealed a normal collecting system without any bleeding, tumor, or erythema.     We then replaced the cystoscope.  We used a cold-cup flexible biopsy forceps to biopsy the dome of the bladder as well as random bladder biopsies and passed these off for pathology. A Bugby was used to fulgurate arterial bleeders and the biopsied areas. The bladder was re-examined under low pressure and hemostasis was confirmed.    The procedure was then concluded, the patient was awoken from general anesthesia and extubated. He was transferred to the postanesthesia care unit in stable condition and tolerated the procedure well.      POSTOP PLAN:  1. CT urogram prior to follow up appt

## 2018-09-10 NOTE — IP AVS SNAPSHOT
MRN:[id number]                      After Visit Summary   9/10/2018    Dimple Oviedo    MRN: [id number]           Thank you!     Thank you for choosing [facility] for your care. Our goal is always to provide you with excellent care. Hearing back from our patients is one way we can continue to improve our services. Please take a few minutes to complete the written survey that you may receive in the mail after you visit with us. Thank you!        Patient Information     Date Of Birth          6/23/1933        Designated Caregiver       Most Recent Value    Caregiver    Will someone help with your care after discharge? yes    Name of designated caregiver tej oviedo    Phone number of caregiver     Caregiver address [address] [place] [address]      About your hospital stay     You were admitted on:  September 11, 2018 You last received care in the:  [address] [facility]    You were discharged on:  September 14, 2018        Reason for your hospital stay       On 9/11/18 Ms. Oviedo was admitted with decreased urine output following bilateral ureteroscopy.  She was found to have renal dysfunction.                  Who to Call     For medical emergencies, please call 911.  For non-urgent questions about your medical care, please call your primary care provider or clinic, [phone]  For questions related to your surgery, please call your surgery clinic        Attending Provider     Provider Specialty    Migue Scherer MD Emergency Medicine    Juju Schumacher MD Emergency Medicine    Weight, Stuart Blackmon MD Urology    Carl, Justin Mcgovern MD Urology       Primary Care Provider Office Phone # Fax #    Pop Antonino Zapien [phone]      After Care Instructions     Activity       Your activity upon discharge: See discharge instructions            Diet       Follow this diet upon discharge: See discharge instructions            Discharge Instructions        Diet:  - Regular/ home diet  - Drink plenty of fluids to keep urine yellow or light pink    Activity:   - May resume regular activity as tolerated, but be aware that increased activity can sometimes cause worsening of hematuria (blood in the urine)   - Do not strain with bowel movements.    - Do not drive until you can press the brake pedal quickly and fully without pain.    - Do not operate a motor vehicle while taking narcotic pain medications.   - Ok to resume daily showering starting 48 hours after surgery.    Medications:   1) PAIN: Continue Tylenol (acetaminophen 625mg) every 6 hours as needed for discomfort.  Do not take more than 4,000mg of Tylenol (acetaminophen/ APAP) from all sources in any 24 hour period since this can cause liver damage.  Avoid ibuprofen and other antiinflammatories (ie. Aleve, naproxen, motrin, advil, celebrex, etc.) until your kidney function normalizes.      2) CONSTIPATION: Pericolace (senna/docusate sodium) can be taken twice daily for prevention of constipation.  Surgery can make you constipated and constipation can worsen hematuria (blood in the urine).  Please reduce or stop pericolace if you develop loose stools. Other over the counter solutions such as prune juice, miralax, fiber products, senna, and dulcolax can also be used. If you are taking the pericolace but still have not had a bowel movement in 3 days, start over-the-counter Milk of Magnesia taken twice daily until you have a nice bowel movement.  Call the office with any concerns.     Tubes / drains:  - You have bilateral ureteral stents in place. You can expect some flank discomfort on both sides, possibly worse with urination, and you may experience the urge to void more frequently. You may experience burning in your urethra with urination as well.   - Stents can sometimes cause bladder spasms (lower abdominal pain that feels like the need to urinate). Call the urology clinic if this occurs - medications can be  prescribed to help with these symptoms.   - Drink 2-3L of water a day to keep your urine clear to light pink in color. Some blood in your urine can be expected but should clear with increased water consumption as instructed.   - Call for thickening red urine, large blood clots, or inability to void.                  Follow-up Appointments     Adult [facility] Follow-up and recommended labs and tests       Follow-Up:   - See your primary care provider within 5-7 days of discharge for a post-hospitalization checkup and particularly to recheck your kidney function.   - Follow up with Dr. King as scheduled on 9/20/18 to discuss pathology from your recent ureteroscopy procedure.  You should also discuss with Dr. King when your stents will be changed or removed.   - Call or return sooner than your regularly scheduled visit if you develop any of the following:  Fever (greater than 101.3F) or flu-like symptoms, uncontrolled pain, uncontrolled nausea or vomiting, as well as thickening red urine, large clots or difficulty urinating.    Phone numbers:  - Nursing phone helpline at the Urology Clinic (8A-5P M-F):  [phone].    - Nights or weekends, call [phone] and ask the  to page the urology resident on call.   - For emergencies, always call [phone]      Appointments on [place] and/or [facility] (with [facility] or [facility] provider or service). Call [phone] if you haven't heard regarding these appointments within 7 days of discharge.                  Your next 10 appointments already scheduled     Sep 20, 2018  1:20 PM CDT   CT UROGRAM WO & W CONTRAST with UCCT2   [facility] ([facility])    [address] [address] [phone]           How do I prepare for my exam? (Food and drink instructions) **You will have contrast for this exam.** Do not eat or drink for 2 hours before your exam. If you need to take medicine, you may take it  with small sips of water. (We may ask you to take liquid medicine as well.)  The day before your exam, drink extra fluids at least six 8-ounce glasses (unless your doctor tells you to restrict your fluids).  How do I prepare for my exam? (Other instructions) Patients over 70 or patients with diabetes or kidney problems: If you haven t had a blood test (creatinine test) within the last 30 days, the Cardiologist/Radiologist may require you to get this test prior to your exam.  What should I wear: Please wear loose clothing, such as a sweat suit or jogging clothes.  Avoid snaps, zippers and other metal. We may ask you to undress and put on a hospital gown.  How long does the exam take: Most scans take less than 20 minutes.  What should I bring: Please bring any scans or X-rays taken at other hospitals, if similar tests were done. Also bring a list of your medicines, including vitamins, minerals and over-the-counter drugs. It is safest to leave personal items at home.  Do I need a :  No  is needed.  What do I need to tell my doctor? Be sure to tell your doctor: * If you have any allergies. * If there s any chance you are pregnant. * If you are breastfeeding. * If you have diabetes as your medication may need to be adjusted for this exam.  What should I do after the exam: No restrictions, You may resume normal activities.  What is this test: A CT (computed tomography) scan is a series of pictures that allows us to look inside your body. The scanner creates images of the body in cross sections, much like slices of bread. This helps us see any problems more clearly. You may receive contrast (X-ray dye) before or during your scan. Contrast is given through an IV (small needle in your arm).  Who should I call with questions: If you have any questions, please call the Imaging Department where you will have your exam. Directions, parking instructions, and other information is available on our website,  [email or web address].            Sep 20, 2018  2:00 PM CDT   (Arrive by 1:45 PM)   Return Visit with Stuart King MD   [facility] ([facility])    [address]  4th Floor  [facility] [address]   [phone]            Sep 27, 2018  1:30 PM CDT   (Arrive by 1:15 PM)   NEW ARRHYTHMIA with Butch Griffith MD   [facility] ([facility])    [address]  [address]  [facility] [address]   [phone]            Oct 03, 2018 12:30 PM CDT   (Arrive by 12:15 PM)   Lymphedema Treatment with Crista Jain PT   [facility] ([facility])    [address]  [address]  [facility] [address]   [phone]            Nov 26, 2018  1:30 PM CST   US THYROID with UCUS2   [facility] [place] [facility] ([facility])    [address]  1st Floor  [facility] [address]   [phone]           How do I prepare for my exam? (Food and drink instructions) No Food and Drink Restrictions.  How do I prepare for my exam? (Other instructions) You do not need to do anything special to prepare for your exam.  What should I wear: Wear comfortable clothes.  How long does the exam take: Most ultrasounds take 30 to 60 minutes.  What should I bring: Bring a list of your medicines, including vitamins, minerals and over-the-counter drugs. It is safest to leave personal items at home.  Do I need a :  No  is needed.  What do I need to tell my doctor: Tell your doctor about any allergies you may have.  What should I do after the exam: No restrictions, You may resume normal activities.  What is this test: An ultrasound uses sound waves to make pictures of the body. Sound waves do not cause pain. The only discomfort may be the pressure of the wand against your skin or full bladder.  Who should I call with questions: If  "you have any questions, please call the Imaging Department where you will have your exam. Directions, parking instructions, and other information is available on our website, [email or web address].            Nov 26, 2018  2:15 PM CST   LAB with  LAB   [facility] ([facility])    [address] [place] [id number] [phone]           Please do not eat 10-12 hours before your appointment if you are coming in fasting for labs on lipids, cholesterol, or glucose (sugar). This does not apply to pregnant women. Water, hot tea and black coffee (with nothing added) are okay. Do not drink other fluids, diet soda or chew gum.            Dec 04, 2018  1:30 PM CST   (Arrive by 1:15 PM)   RETURN ENDOCRINE with Dhara Meza MD   [facility] ([facility])    [address] [place] [id number] [phone]              Pending Results     Date and Time Order Name Status Description    9/10/2018 1031 FISH BLADDER CANCER In process     9/10/2018 1029 Nathan Miscellaneous Test In process     9/10/2018 0938 FISH BLADDER CANCER In process     9/10/2018 0938 FISH BLADDER CANCER In process     9/10/2018 0916 Nathan Miscellaneous Test In process     9/10/2018 0915 Nathan Miscellaneous Test In process             Statement of Approval     Ordered          09/14/18 1100  I have reviewed and agree with all the recommendations and orders detailed in this document.  EFFECTIVE NOW     Approved and electronically signed by:  Geovanna Fregoso PA             Admission Information     Date & Time Provider Department Dept. Phone    9/10/2018 Justin Henry MD [address] [facility] [place] [phone]      Your Vitals Were     Blood Pressure Pulse Temperature Respirations Height Weight    163/55 (BP Location: Left arm) 72 97.1  F (36.2  C) (Oral) 18 1.448 m (4' 9\") 66.2 kg (146 lb)    Pulse Oximetry BMI (Body Mass Index)          "       90% 31.59 kg/m2          [employer] Information     [employer] gives you secure access to your electronic health record. If you see a primary care provider, you can also send messages to your care team and make appointments. If you have questions, please call your primary care clinic.  If you do not have a primary care provider, please call [phone] and they will assist you.        Care EveryWhere ID     This is your Care EveryWhere ID. This could be used by other organizations to access your [facility] medical records  [id number]        Equal Access to Services     GUNNER RODRIGUEZ : Hadii shameka chacon haddarcyo Soomaali, waaxda luqadaha, qaybta kaalmada saira, maldonado seay . So [facility] [phone].    ATENCIÓN: Si habla español, tiene a sierra disposición servicios gratuitos de asistencia lingüística. Llame al [phone].    We comply with applicable federal civil rights laws and [place] laws. We do not discriminate on the basis of race, color, national origin, age, disability, sex, sexual orientation, or gender identity.               Review of your medicines      START taking        Dose / Directions    senna-docusate 8.6-50 MG per tablet   Commonly known as:  SENOKOT-S;PERICOLACE   Used for:  Acute kidney injury (H)        Dose:  1-2 tablet   Take 1-2 tablets by mouth 2 times daily To prevent constipation   Quantity:  45 tablet   Refills:  0         CONTINUE these medicines which may have CHANGED, or have new prescriptions. If we are uncertain of the size of tablets/capsules you have at home, strength may be listed as something that might have changed.        Dose / Directions    acetaminophen 650 MG CR tablet   Commonly known as:  TYLENOL   This may have changed:  Another medication with the same name was removed. Continue taking this medication, and follow the directions you see here.        Dose:  650 mg   Take 650 mg by mouth as needed   Refills:  0       alendronate 70 MG tablet  "  Commonly known as:  FOSAMAX   This may have changed:  See the new instructions.   Used for:  High risk medication use, Osteopenia        TAKE 1 TABLET EVERY 7 DAYS AT LEAST 60 MINUTES BEFORE BREAKFAST AS DIRECTED \"SEE PACKAGE FOR ADDITIONAL INSTRUCTIONS\"   Quantity:  12 tablet   Refills:  0       colestipol 1 g tablet   Commonly known as:  COLESTID   This may have changed:  Another medication with the same name was removed. Continue taking this medication, and follow the directions you see here.        Dose:  1 g   Take 1 g by mouth 2 times daily TAKE OTHER MEDS 1 HOUR BEFORE OR 4 HOURS AFTER COLESID   Refills:  0       ferrous sulfate 325 (65 Fe) MG Tbec EC tablet   This may have changed:  Another medication with the same name was removed. Continue taking this medication, and follow the directions you see here.        Dose:  325 mg   Take 325 mg by mouth daily   Refills:  0       irbesartan 300 MG tablet   Commonly known as:  AVAPRO   This may have changed:    - how much to take  - how to take this  - when to take this  - additional instructions   Used for:  Essential hypertension with goal blood pressure less than 140/90        TAKE 1 TABLET EVERY DAY   Quantity:  90 tablet   Refills:  2       methimazole 5 MG tablet   Commonly known as:  TAPAZOLE   This may have changed:  when to take this   Used for:  Nontoxic multinodular goiter        Dose:  5 mg   Take 1 tablet (5 mg) by mouth daily   Quantity:  90 tablet   Refills:  1       omeprazole 20 MG CR capsule   Commonly known as:  priLOSEC   This may have changed:  when to take this   Used for:  Gastroesophageal reflux disease without esophagitis        Dose:  20 mg   Take 1 capsule (20 mg) by mouth daily   Quantity:  180 capsule   Refills:  3       propranolol 60 MG 24 hr capsule   Commonly known as:  INDERAL LA   This may have changed:  when to take this   Used for:  Nontoxic multinodular goiter        Dose:  60 mg   Take 1 capsule (60 mg) by mouth daily "   Quantity:  90 capsule   Refills:  1       sertraline 100 MG tablet   Commonly known as:  ZOLOFT   This may have changed:  when to take this   Used for:  THERON (generalized anxiety disorder)        Dose:  100 mg   Take 1 tablet (100 mg) by mouth daily   Quantity:  90 tablet   Refills:  1       traZODone 50 MG tablet   Commonly known as:  DESYREL   This may have changed:  See the new instructions.   Used for:  Insomnia, unspecified type        TAKE 1 TABLET(50 MG) BY MOUTH EVERY NIGHT AS NEEDED FOR SLEEP   Quantity:  90 tablet   Refills:  1         CONTINUE these medicines which have NOT CHANGED        Dose / Directions    aspirin 81 MG tablet        Dose:  81 mg   Take 81 mg by mouth daily   Refills:  0       CALCIUM + D PO        Dose:  1 tablet   Take 1 tablet by mouth 2 times daily   Refills:  0       cycloSPORINE 0.05 % ophthalmic emulsion   Commonly known as:  RESTASIS        Dose:  1 drop   Place 1 drop into both eyes 2 times daily   Refills:  0       Fish Oil 500 MG Caps        Dose:  1 capsule   Take 1 capsule by mouth 2 times daily   Refills:  0       furosemide 20 MG tablet   Commonly known as:  LASIX   Used for:  Stasis edema of both lower extremities, (HFpEF) heart failure with preserved ejection fraction (H)        Dose:  20 mg   Take 1 tablet (20 mg) by mouth every morning   Refills:  0       lovastatin 40 MG tablet   Commonly known as:  MEVACOR   Used for:  Hyperlipidemia LDL goal <130        TAKE 1 TABLET AT BEDTIME (HYPERLIPIDEMIA LDL GOAL BELOW 130)   Quantity:  90 tablet   Refills:  2       Melatonin 10 MG Tabs tablet        Dose:  10 mg   Take 10 mg by mouth as needed for sleep   Refills:  0       nitroGLYcerin 0.4 MG sublingual tablet   Commonly known as:  NITROSTAT   Used for:  Elevated troponin        For chest pain place 1 tablet under the tongue every 5 minutes for 3 doses. If symptoms persist 5 minutes after 1st dose call 911.   Quantity:  25 tablet   Refills:  3       order for DME   Used  "for:  Lymphedema of both lower extremities        Equipment being ordered: Knee high compression for B LE 20-30mmHg, Velcro units for night time (consider hybrid sock as foot swelling is less than leg swelling), Donning device   Quantity:  1 each   Refills:  2       PROBIOTIC ADVANCED PO        Dose:  1 capsule   Take 1 capsule by mouth every morning   Refills:  0       rOPINIRole 0.25 MG tablet   Commonly known as:  REQUIP   Used for:  Restless legs syndrome        TAKE 1 TABLET(0.25 MG) BY MOUTH EVERY NIGHT AS NEEDED   Quantity:  90 tablet   Refills:  0       SYSTANE 0.4-0.3 % Soln ophthalmic solution   Generic drug:  polyethylene glycol 0.4%- propylene glycol 0.3%        Dose:  1 drop   Place 1 drop into both eyes 2 times daily as needed for dry eyes   Refills:  0         STOP taking     ASPIRIN PO           VALIUM PO                Where to get your medicines      These medications were sent to [facility] [place], [place] - [address] [address]     Phone:  [phone]     senna-docusate 8.6-50 MG per tablet                Protect others around you: Learn how to safely use, store and throw away your medicines at www.disposemymeds.org.             Medication List: This is a list of all your medications and when to take them. Check marks below indicate your daily home schedule. Keep this list as a reference.      Medications           Morning Afternoon Evening Bedtime As Needed    acetaminophen 650 MG CR tablet   Commonly known as:  TYLENOL   Take 650 mg by mouth as needed                                alendronate 70 MG tablet   Commonly known as:  FOSAMAX   TAKE 1 TABLET EVERY 7 DAYS AT LEAST 60 MINUTES BEFORE BREAKFAST AS DIRECTED \"SEE PACKAGE FOR ADDITIONAL INSTRUCTIONS\"                                aspirin 81 MG tablet   Take 81 mg by mouth daily                                CALCIUM + D PO   Take 1 tablet by mouth 2 times daily                  "               colestipol 1 g tablet   Commonly known as:  COLESTID   Take 1 g by mouth 2 times daily TAKE OTHER MEDS 1 HOUR BEFORE OR 4 HOURS AFTER COLESID                                cycloSPORINE 0.05 % ophthalmic emulsion   Commonly known as:  RESTASIS   Place 1 drop into both eyes 2 times daily                                ferrous sulfate 325 (65 Fe) MG Tbec EC tablet   Take 325 mg by mouth daily                                Fish Oil 500 MG Caps   Take 1 capsule by mouth 2 times daily                                furosemide 20 MG tablet   Commonly known as:  LASIX   Take 1 tablet (20 mg) by mouth every morning   Last time this was given:  20 mg on 9/14/2018  8:48 AM                                irbesartan 300 MG tablet   Commonly known as:  AVAPRO   TAKE 1 TABLET EVERY DAY   Last time this was given:  300 mg on 9/11/2018  9:42 AM                                lovastatin 40 MG tablet   Commonly known as:  MEVACOR   TAKE 1 TABLET AT BEDTIME (HYPERLIPIDEMIA LDL GOAL BELOW 130)                                Melatonin 10 MG Tabs tablet   Take 10 mg by mouth as needed for sleep                                methimazole 5 MG tablet   Commonly known as:  TAPAZOLE   Take 1 tablet (5 mg) by mouth daily   Last time this was given:  5 mg on 9/14/2018  8:47 AM                                nitroGLYcerin 0.4 MG sublingual tablet   Commonly known as:  NITROSTAT   For chest pain place 1 tablet under the tongue every 5 minutes for 3 doses. If symptoms persist 5 minutes after 1st dose call 911.                                omeprazole 20 MG CR capsule   Commonly known as:  priLOSEC   Take 1 capsule (20 mg) by mouth daily                                order for DME   Equipment being ordered: Knee high compression for B LE 20-30mmHg, Velcro units for night time (consider hybrid sock as foot swelling is less than leg swelling), Donning device                                PROBIOTIC ADVANCED PO   Take 1 capsule by  mouth every morning                                propranolol 60 MG 24 hr capsule   Commonly known as:  INDERAL LA   Take 1 capsule (60 mg) by mouth daily   Last time this was given:  60 mg on 9/14/2018  8:48 AM                                rOPINIRole 0.25 MG tablet   Commonly known as:  REQUIP   TAKE 1 TABLET(0.25 MG) BY MOUTH EVERY NIGHT AS NEEDED   Last time this was given:  0.25 mg on 9/14/2018 12:13 AM                                senna-docusate 8.6-50 MG per tablet   Commonly known as:  SENOKOT-S;PERICOLACE   Take 1-2 tablets by mouth 2 times daily To prevent constipation   Last time this was given:  1 tablet on 9/14/2018  8:48 AM                                sertraline 100 MG tablet   Commonly known as:  ZOLOFT   Take 1 tablet (100 mg) by mouth daily   Last time this was given:  100 mg on 9/14/2018  8:48 AM                                SYSTANE 0.4-0.3 % Soln ophthalmic solution   Place 1 drop into both eyes 2 times daily as needed for dry eyes   Generic drug:  polyethylene glycol 0.4%- propylene glycol 0.3%                                traZODone 50 MG tablet   Commonly known as:  DESYREL   TAKE 1 TABLET(50 MG) BY MOUTH EVERY NIGHT AS NEEDED FOR SLEEP   Last time this was given:  50 mg on 9/12/2018 12:21 AM

## 2018-09-11 ENCOUNTER — APPOINTMENT (OUTPATIENT)
Dept: CT IMAGING | Facility: CLINIC | Age: 83
DRG: 694 | End: 2018-09-11
Payer: MEDICARE

## 2018-09-11 ENCOUNTER — APPOINTMENT (OUTPATIENT)
Dept: GENERAL RADIOLOGY | Facility: CLINIC | Age: 83
DRG: 694 | End: 2018-09-11
Payer: MEDICARE

## 2018-09-11 ENCOUNTER — APPOINTMENT (OUTPATIENT)
Dept: CARDIOLOGY | Facility: CLINIC | Age: 83
DRG: 694 | End: 2018-09-11
Payer: MEDICARE

## 2018-09-11 PROBLEM — N17.9 AKI (ACUTE KIDNEY INJURY) (H): Status: ACTIVE | Noted: 2018-09-11

## 2018-09-11 PROBLEM — N13.30 HYDRONEPHROSIS: Status: ACTIVE | Noted: 2018-09-11

## 2018-09-11 LAB
ALBUMIN SERPL-MCNC: 3.1 G/DL (ref 3.4–5)
ALP SERPL-CCNC: 129 U/L (ref 40–150)
ALT SERPL W P-5'-P-CCNC: 92 U/L (ref 0–50)
ANION GAP SERPL CALCULATED.3IONS-SCNC: 11 MMOL/L (ref 3–14)
ANION GAP SERPL CALCULATED.3IONS-SCNC: 9 MMOL/L (ref 3–14)
AST SERPL W P-5'-P-CCNC: 103 U/L (ref 0–45)
BASOPHILS # BLD AUTO: 0 10E9/L (ref 0–0.2)
BASOPHILS # BLD AUTO: 0 10E9/L (ref 0–0.2)
BASOPHILS NFR BLD AUTO: 0 %
BASOPHILS NFR BLD AUTO: 0.1 %
BILIRUB SERPL-MCNC: 0.7 MG/DL (ref 0.2–1.3)
BUN SERPL-MCNC: 33 MG/DL (ref 7–30)
BUN SERPL-MCNC: 35 MG/DL (ref 7–30)
CALCIUM SERPL-MCNC: 8.2 MG/DL (ref 8.5–10.1)
CALCIUM SERPL-MCNC: 8.4 MG/DL (ref 8.5–10.1)
CHLORIDE SERPL-SCNC: 102 MMOL/L (ref 94–109)
CHLORIDE SERPL-SCNC: 99 MMOL/L (ref 94–109)
CO2 SERPL-SCNC: 19 MMOL/L (ref 20–32)
CO2 SERPL-SCNC: 23 MMOL/L (ref 20–32)
COPATH REPORT: NORMAL
CREAT SERPL-MCNC: 1.85 MG/DL (ref 0.52–1.04)
CREAT SERPL-MCNC: 2.21 MG/DL (ref 0.52–1.04)
DIFFERENTIAL METHOD BLD: ABNORMAL
DIFFERENTIAL METHOD BLD: ABNORMAL
EOSINOPHIL # BLD AUTO: 0 10E9/L (ref 0–0.7)
EOSINOPHIL # BLD AUTO: 0 10E9/L (ref 0–0.7)
EOSINOPHIL NFR BLD AUTO: 0 %
EOSINOPHIL NFR BLD AUTO: 0 %
ERYTHROCYTE [DISTWIDTH] IN BLOOD BY AUTOMATED COUNT: 14.3 % (ref 10–15)
ERYTHROCYTE [DISTWIDTH] IN BLOOD BY AUTOMATED COUNT: 14.5 % (ref 10–15)
GFR SERPL CREATININE-BSD FRML MDRD: 21 ML/MIN/1.7M2
GFR SERPL CREATININE-BSD FRML MDRD: 26 ML/MIN/1.7M2
GLUCOSE SERPL-MCNC: 101 MG/DL (ref 70–99)
GLUCOSE SERPL-MCNC: 159 MG/DL (ref 70–99)
HCT VFR BLD AUTO: 31.7 % (ref 35–47)
HCT VFR BLD AUTO: 32.6 % (ref 35–47)
HGB BLD-MCNC: 10 G/DL (ref 11.7–15.7)
HGB BLD-MCNC: 10.3 G/DL (ref 11.7–15.7)
IMM GRANULOCYTES # BLD: 0 10E9/L (ref 0–0.4)
IMM GRANULOCYTES # BLD: 0 10E9/L (ref 0–0.4)
IMM GRANULOCYTES NFR BLD: 0.2 %
IMM GRANULOCYTES NFR BLD: 0.2 %
LYMPHOCYTES # BLD AUTO: 0.8 10E9/L (ref 0.8–5.3)
LYMPHOCYTES # BLD AUTO: 1.1 10E9/L (ref 0.8–5.3)
LYMPHOCYTES NFR BLD AUTO: 7.6 %
LYMPHOCYTES NFR BLD AUTO: 8.6 %
MCH RBC QN AUTO: 27.4 PG (ref 26.5–33)
MCH RBC QN AUTO: 27.8 PG (ref 26.5–33)
MCHC RBC AUTO-ENTMCNC: 31.5 G/DL (ref 31.5–36.5)
MCHC RBC AUTO-ENTMCNC: 31.6 G/DL (ref 31.5–36.5)
MCV RBC AUTO: 87 FL (ref 78–100)
MCV RBC AUTO: 88 FL (ref 78–100)
MONOCYTES # BLD AUTO: 1.1 10E9/L (ref 0–1.3)
MONOCYTES # BLD AUTO: 1.4 10E9/L (ref 0–1.3)
MONOCYTES NFR BLD AUTO: 10.8 %
MONOCYTES NFR BLD AUTO: 11.1 %
NEUTROPHILS # BLD AUTO: 8.3 10E9/L (ref 1.6–8.3)
NEUTROPHILS # BLD AUTO: 9.9 10E9/L (ref 1.6–8.3)
NEUTROPHILS NFR BLD AUTO: 80 %
NEUTROPHILS NFR BLD AUTO: 81.4 %
NRBC # BLD AUTO: 0 10*3/UL
NRBC # BLD AUTO: 0 10*3/UL
NRBC BLD AUTO-RTO: 0 /100
NRBC BLD AUTO-RTO: 0 /100
NT-PROBNP SERPL-MCNC: ABNORMAL PG/ML (ref 0–1800)
PLATELET # BLD AUTO: 202 10E9/L (ref 150–450)
PLATELET # BLD AUTO: 214 10E9/L (ref 150–450)
POTASSIUM SERPL-SCNC: 5.2 MMOL/L (ref 3.4–5.3)
POTASSIUM SERPL-SCNC: 5.3 MMOL/L (ref 3.4–5.3)
POTASSIUM SERPL-SCNC: 5.6 MMOL/L (ref 3.4–5.3)
PROT SERPL-MCNC: 6.5 G/DL (ref 6.8–8.8)
RBC # BLD AUTO: 3.65 10E12/L (ref 3.8–5.2)
RBC # BLD AUTO: 3.7 10E12/L (ref 3.8–5.2)
SODIUM SERPL-SCNC: 130 MMOL/L (ref 133–144)
SODIUM SERPL-SCNC: 132 MMOL/L (ref 133–144)
WBC # BLD AUTO: 10.2 10E9/L (ref 4–11)
WBC # BLD AUTO: 12.3 10E9/L (ref 4–11)

## 2018-09-11 PROCEDURE — 40000556 ZZH STATISTIC PERIPHERAL IV START W US GUIDANCE

## 2018-09-11 PROCEDURE — 80053 COMPREHEN METABOLIC PANEL: CPT | Performed by: PHYSICIAN ASSISTANT

## 2018-09-11 PROCEDURE — 85025 COMPLETE CBC W/AUTO DIFF WBC: CPT | Performed by: EMERGENCY MEDICINE

## 2018-09-11 PROCEDURE — 36415 COLL VENOUS BLD VENIPUNCTURE: CPT | Performed by: STUDENT IN AN ORGANIZED HEALTH CARE EDUCATION/TRAINING PROGRAM

## 2018-09-11 PROCEDURE — 74176 CT ABD & PELVIS W/O CONTRAST: CPT

## 2018-09-11 PROCEDURE — 85025 COMPLETE CBC W/AUTO DIFF WBC: CPT | Performed by: PHYSICIAN ASSISTANT

## 2018-09-11 PROCEDURE — 71046 X-RAY EXAM CHEST 2 VIEWS: CPT

## 2018-09-11 PROCEDURE — A9270 NON-COVERED ITEM OR SERVICE: HCPCS | Mod: GY | Performed by: EMERGENCY MEDICINE

## 2018-09-11 PROCEDURE — A9270 NON-COVERED ITEM OR SERVICE: HCPCS | Mod: GY | Performed by: PHYSICIAN ASSISTANT

## 2018-09-11 PROCEDURE — G0378 HOSPITAL OBSERVATION PER HR: HCPCS

## 2018-09-11 PROCEDURE — 25000128 H RX IP 250 OP 636: Performed by: PHYSICIAN ASSISTANT

## 2018-09-11 PROCEDURE — 36415 COLL VENOUS BLD VENIPUNCTURE: CPT | Performed by: PHYSICIAN ASSISTANT

## 2018-09-11 PROCEDURE — 93306 TTE W/DOPPLER COMPLETE: CPT | Mod: 26 | Performed by: INTERNAL MEDICINE

## 2018-09-11 PROCEDURE — 25000132 ZZH RX MED GY IP 250 OP 250 PS 637: Mod: GY | Performed by: EMERGENCY MEDICINE

## 2018-09-11 PROCEDURE — 96360 HYDRATION IV INFUSION INIT: CPT | Performed by: EMERGENCY MEDICINE

## 2018-09-11 PROCEDURE — 80048 BASIC METABOLIC PNL TOTAL CA: CPT | Performed by: EMERGENCY MEDICINE

## 2018-09-11 PROCEDURE — 25000132 ZZH RX MED GY IP 250 OP 250 PS 637: Mod: GY | Performed by: PHYSICIAN ASSISTANT

## 2018-09-11 PROCEDURE — 96375 TX/PRO/DX INJ NEW DRUG ADDON: CPT

## 2018-09-11 PROCEDURE — 96374 THER/PROPH/DIAG INJ IV PUSH: CPT

## 2018-09-11 PROCEDURE — 93306 TTE W/DOPPLER COMPLETE: CPT

## 2018-09-11 PROCEDURE — 84132 ASSAY OF SERUM POTASSIUM: CPT | Performed by: STUDENT IN AN ORGANIZED HEALTH CARE EDUCATION/TRAINING PROGRAM

## 2018-09-11 PROCEDURE — C9113 INJ PANTOPRAZOLE SODIUM, VIA: HCPCS | Performed by: PHYSICIAN ASSISTANT

## 2018-09-11 PROCEDURE — 12000001 ZZH R&B MED SURG/OB UMMC

## 2018-09-11 PROCEDURE — 25000128 H RX IP 250 OP 636: Performed by: EMERGENCY MEDICINE

## 2018-09-11 PROCEDURE — 96361 HYDRATE IV INFUSION ADD-ON: CPT

## 2018-09-11 PROCEDURE — 83880 ASSAY OF NATRIURETIC PEPTIDE: CPT | Performed by: PHYSICIAN ASSISTANT

## 2018-09-11 PROCEDURE — 99220 ZZC INITIAL OBSERVATION CARE,LEVL III: CPT | Mod: Z6 | Performed by: EMERGENCY MEDICINE

## 2018-09-11 RX ORDER — ROPINIROLE 0.25 MG/1
0.25 TABLET, FILM COATED ORAL 3 TIMES DAILY PRN
Status: DISCONTINUED | OUTPATIENT
Start: 2018-09-11 | End: 2018-09-14 | Stop reason: HOSPADM

## 2018-09-11 RX ORDER — ROPINIROLE 0.25 MG/1
0.25 TABLET, FILM COATED ORAL ONCE
Status: COMPLETED | OUTPATIENT
Start: 2018-09-11 | End: 2018-09-11

## 2018-09-11 RX ORDER — ONDANSETRON 2 MG/ML
4 INJECTION INTRAMUSCULAR; INTRAVENOUS EVERY 6 HOURS PRN
Status: DISCONTINUED | OUTPATIENT
Start: 2018-09-11 | End: 2018-09-14 | Stop reason: HOSPADM

## 2018-09-11 RX ORDER — METHIMAZOLE 5 MG/1
5 TABLET ORAL EVERY MORNING
Status: DISCONTINUED | OUTPATIENT
Start: 2018-09-11 | End: 2018-09-14 | Stop reason: HOSPADM

## 2018-09-11 RX ORDER — ONDANSETRON 2 MG/ML
4 INJECTION INTRAMUSCULAR; INTRAVENOUS ONCE
Status: COMPLETED | OUTPATIENT
Start: 2018-09-11 | End: 2018-09-11

## 2018-09-11 RX ORDER — SODIUM CHLORIDE 9 MG/ML
INJECTION, SOLUTION INTRAVENOUS CONTINUOUS
Status: ACTIVE | OUTPATIENT
Start: 2018-09-11 | End: 2018-09-12

## 2018-09-11 RX ORDER — AMOXICILLIN 250 MG
1-2 CAPSULE ORAL 2 TIMES DAILY
Status: DISCONTINUED | OUTPATIENT
Start: 2018-09-11 | End: 2018-09-14 | Stop reason: HOSPADM

## 2018-09-11 RX ORDER — ACETAMINOPHEN 325 MG/1
650 TABLET ORAL EVERY 4 HOURS PRN
Status: DISCONTINUED | OUTPATIENT
Start: 2018-09-11 | End: 2018-09-14 | Stop reason: HOSPADM

## 2018-09-11 RX ORDER — FUROSEMIDE 10 MG/ML
20 INJECTION INTRAMUSCULAR; INTRAVENOUS ONCE
Status: COMPLETED | OUTPATIENT
Start: 2018-09-11 | End: 2018-09-11

## 2018-09-11 RX ORDER — IRBESARTAN 300 MG/1
300 TABLET ORAL EVERY MORNING
Status: DISCONTINUED | OUTPATIENT
Start: 2018-09-11 | End: 2018-09-11

## 2018-09-11 RX ORDER — SERTRALINE HYDROCHLORIDE 100 MG/1
100 TABLET, FILM COATED ORAL EVERY MORNING
Status: DISCONTINUED | OUTPATIENT
Start: 2018-09-11 | End: 2018-09-14 | Stop reason: HOSPADM

## 2018-09-11 RX ORDER — LORAZEPAM 2 MG/ML
0.25 INJECTION INTRAMUSCULAR ONCE
Status: COMPLETED | OUTPATIENT
Start: 2018-09-11 | End: 2018-09-11

## 2018-09-11 RX ORDER — ONDANSETRON 4 MG/1
4 TABLET, ORALLY DISINTEGRATING ORAL EVERY 6 HOURS PRN
Status: DISCONTINUED | OUTPATIENT
Start: 2018-09-11 | End: 2018-09-14 | Stop reason: HOSPADM

## 2018-09-11 RX ORDER — NALOXONE HYDROCHLORIDE 0.4 MG/ML
.1-.4 INJECTION, SOLUTION INTRAMUSCULAR; INTRAVENOUS; SUBCUTANEOUS
Status: DISCONTINUED | OUTPATIENT
Start: 2018-09-11 | End: 2018-09-14 | Stop reason: HOSPADM

## 2018-09-11 RX ORDER — CEFAZOLIN SODIUM 2 G/100ML
2 INJECTION, SOLUTION INTRAVENOUS
Status: DISCONTINUED | OUTPATIENT
Start: 2018-09-11 | End: 2018-09-12 | Stop reason: HOSPADM

## 2018-09-11 RX ORDER — TRAZODONE HYDROCHLORIDE 50 MG/1
50 TABLET, FILM COATED ORAL
Status: DISCONTINUED | OUTPATIENT
Start: 2018-09-11 | End: 2018-09-14 | Stop reason: HOSPADM

## 2018-09-11 RX ORDER — SODIUM CHLORIDE 9 MG/ML
INJECTION, SOLUTION INTRAVENOUS CONTINUOUS
Status: DISCONTINUED | OUTPATIENT
Start: 2018-09-11 | End: 2018-09-11

## 2018-09-11 RX ORDER — PHENOL 1.4 %
10 AEROSOL, SPRAY (ML) MUCOUS MEMBRANE
Status: DISCONTINUED | OUTPATIENT
Start: 2018-09-11 | End: 2018-09-11

## 2018-09-11 RX ORDER — PROPRANOLOL HCL 60 MG
60 CAPSULE, EXTENDED RELEASE 24HR ORAL EVERY MORNING
Status: DISCONTINUED | OUTPATIENT
Start: 2018-09-11 | End: 2018-09-14 | Stop reason: HOSPADM

## 2018-09-11 RX ORDER — CEFAZOLIN SODIUM 1 G/3ML
1 INJECTION, POWDER, FOR SOLUTION INTRAMUSCULAR; INTRAVENOUS SEE ADMIN INSTRUCTIONS
Status: DISCONTINUED | OUTPATIENT
Start: 2018-09-11 | End: 2018-09-12 | Stop reason: HOSPADM

## 2018-09-11 RX ADMIN — ONDANSETRON 4 MG: 2 INJECTION INTRAMUSCULAR; INTRAVENOUS at 06:13

## 2018-09-11 RX ADMIN — SODIUM CHLORIDE: 9 INJECTION, SOLUTION INTRAVENOUS at 06:54

## 2018-09-11 RX ADMIN — IRBESARTAN 300 MG: 300 TABLET, FILM COATED ORAL at 09:42

## 2018-09-11 RX ADMIN — ROPINIROLE HYDROCHLORIDE 0.25 MG: 0.25 TABLET, FILM COATED ORAL at 22:41

## 2018-09-11 RX ADMIN — SENNOSIDES AND DOCUSATE SODIUM 1 TABLET: 8.6; 5 TABLET ORAL at 09:41

## 2018-09-11 RX ADMIN — SODIUM CHLORIDE 1000 ML: 900 INJECTION, SOLUTION INTRAVENOUS at 03:05

## 2018-09-11 RX ADMIN — SERTRALINE HYDROCHLORIDE 100 MG: 100 TABLET ORAL at 09:40

## 2018-09-11 RX ADMIN — METHIMAZOLE 5 MG: 5 TABLET ORAL at 09:40

## 2018-09-11 RX ADMIN — PROPRANOLOL HYDROCHLORIDE 60 MG: 60 CAPSULE, EXTENDED RELEASE ORAL at 09:40

## 2018-09-11 RX ADMIN — PANTOPRAZOLE SODIUM 40 MG: 40 INJECTION, POWDER, FOR SOLUTION INTRAVENOUS at 19:19

## 2018-09-11 RX ADMIN — LORAZEPAM 0.25 MG: 2 INJECTION INTRAMUSCULAR; INTRAVENOUS at 06:37

## 2018-09-11 RX ADMIN — ROPINIROLE HYDROCHLORIDE 0.25 MG: 0.25 TABLET, FILM COATED ORAL at 17:31

## 2018-09-11 RX ADMIN — ROPINIROLE HYDROCHLORIDE 0.25 MG: 0.25 TABLET, FILM COATED ORAL at 03:05

## 2018-09-11 RX ADMIN — FUROSEMIDE 20 MG: 10 INJECTION, SOLUTION INTRAVENOUS at 09:42

## 2018-09-11 RX ADMIN — ROPINIROLE HYDROCHLORIDE 0.25 MG: 0.25 TABLET, FILM COATED ORAL at 04:41

## 2018-09-11 RX ADMIN — SENNOSIDES AND DOCUSATE SODIUM 2 TABLET: 8.6; 5 TABLET ORAL at 19:19

## 2018-09-11 RX ADMIN — ROPINIROLE HYDROCHLORIDE 0.25 MG: 0.25 TABLET, FILM COATED ORAL at 12:20

## 2018-09-11 RX ADMIN — PANTOPRAZOLE SODIUM 40 MG: 40 INJECTION, POWDER, FOR SOLUTION INTRAVENOUS at 09:41

## 2018-09-11 ASSESSMENT — ACTIVITIES OF DAILY LIVING (ADL)
RETIRED_COMMUNICATION: 0-->UNDERSTANDS/COMMUNICATES WITHOUT DIFFICULTY
RETIRED_EATING: 1-->ASSISTIVE EQUIPMENT
AMBULATION: 1-->ASSISTIVE EQUIPMENT
COGNITION: 0 - NO COGNITION ISSUES REPORTED
ADLS_ACUITY_SCORE: 13
ADLS_ACUITY_SCORE: 19
TRANSFERRING: 1-->ASSISTIVE EQUIPMENT
FALL_HISTORY_WITHIN_LAST_SIX_MONTHS: NO
TOILETING: 1-->ASSISTIVE EQUIPMENT
DRESS: 1-->ASSISTIVE EQUIPMENT
SWALLOWING: 0-->SWALLOWS FOODS/LIQUIDS WITHOUT DIFFICULTY
BATHING: 1-->ASSISTIVE EQUIPMENT

## 2018-09-11 NOTE — ED PROVIDER NOTES
[facility] ([facility] – [place], [place])  9/10/18   History     Chief Complaint   Patient presents with     Hematuria     retention post      The history is provided by the patient and medical records.     Dimple Oviedo is a 85 year old female with a medical history significant for positive FISH, but negative biopsies and washings who has been dealing with hematuria for the past few months and underwent a cystoscopy, bilateral ureteroscopy, random bladder biopsies, retrogram pyelograms and ureteral washings today.  Patient presents to the Emergency Department now for evaluation of urinary retention and hematuria.  Patient reports that since undergoing the procedure she has had difficulty urinating; she was able to urinate a small amount between 4-5 PM and she does note a small amount of blood in the urine.  Patient since has been unable to urinate and is having significant bladder discomfort.  She denies any fevers.    I have reviewed the Medications, Allergies, Past Medical and Surgical History, and Social History in the [employer] system.    Past Medical History:   Diagnosis Date     Calculus of kidney      Esophageal reflux      GERD (gastroesophageal reflux disease)      Hyperlipidemia LDL goal <130 5/9/2010     Malignant melanoma of skin of trunk, except scrotum (H)      Nonspecific abnormal finding     has living will 2004 -      Nontoxic multinodular goiter     no further eval /tx rec per pt     Osteopenia      Other psoriasis      Personal history of colonic polyps      PMR (polymyalgia rheumatica) (H)      Stress-induced cardiomyopathy      Undiagnosed cardiac murmurs      Unspecified constipation      Unspecified essential hypertension        Past Surgical History:   Procedure Laterality Date     BIOPSY       C NONSPECIFIC PROCEDURE  2005    colonoscopy polyp repeat 2010     COLONOSCOPY  2014     COMBINED CYSTOSCOPY, RETROGRADES, URETEROSCOPY, INSERT STENT Bilateral 4/3/2018    Procedure:  COMBINED CYSTOSCOPY, RETROGRADES, URETEROSCOPY, INSERT STENT;;  Surgeon: Stuart King MD;  [facility]     CYSTOSCOPY, BIOPSY BLADDER INSTILL OPTICAL AGENT N/A 4/3/2018    Procedure: CYSTOSCOPY, BIOPSY BLADDER INSTILL OPTICAL AGENT;  Cystoscopy, Blue Light Cystoscopy, Bladder Biopsies, Bilateral Selective ureteral washings for Cytology, Bilateral Retrograde Pyelograms, Bilateral Ureteroscopy;  Surgeon: Stuart King MD;  [facility]     CYSTOSCOPY, BIOPSY BLADDER, COMBINED N/A 2/19/2018    Procedure: COMBINED CYSTOSCOPY, BIOPSY BLADDER;  Cystoscopy, Bladder Biopsy;  Surgeon: Kenna La MD;  [facility]     ENDOSCOPIC ULTRASOUND LOWER GASTROINTESTIONAL TRACT (GI) N/A 10/30/2015    Procedure: ENDOSCOPIC ULTRASOUND LOWER GASTROINTESTIONAL TRACT (GI);  Surgeon: Daniel Jean-Baptiste MD;  [facility] OR     EYE SURGERY  12/4/17     LAPAROSCOPIC CHOLECYSTECTOMY WITH CHOLANGIOGRAMS N/A 11/1/2015    Procedure: LAPAROSCOPIC CHOLECYSTECTOMY WITH CHOLANGIOGRAMS;  Surgeon: Tonie Warren MD;  [facility]     SURGICAL HISTORY OF -   1996    malignant melanoma     SURGICAL HISTORY OF -   1968    thyroid nodule     SURGICAL HISTORY OF -       D & C       Family History   Problem Relation Age of Onset     Cancer Father      dec - esophageal and laryngeal     HEART DISEASE Mother      Respiratory Mother      dec     Breast Cancer Daughter      Other Cancer Daughter      Thyroid Disease Daughter      Asthma Daughter      Hyperlipidemia Son      Diabetes Son        Social History   Substance Use Topics     Smoking status: Never Smoker     Smokeless tobacco: Never Used     Alcohol use No      Comment: 6 times per year-one drink       Current Facility-Administered Medications   Medication     0.9% sodium chloride BOLUS     rOPINIRole (REQUIP) tablet 0.25 mg     Current Outpatient Prescriptions   Medication     Acetaminophen (TYLENOL ARTHRITIS PAIN PO)     alendronate  "(FOSAMAX) 70 MG tablet     ASPIRIN PO     Calcium Citrate-Vitamin D (CALCIUM + D PO)     colestipol (COLESTID) 1 g tablet     cycloSPORINE (RESTASIS) 0.05 % ophthalmic emulsion     DiazePAM (VALIUM PO)     Ferrous Sulfate (IRON SUPPLEMENT PO)     furosemide (LASIX) 20 MG tablet     irbesartan (AVAPRO) 300 MG tablet     lovastatin (MEVACOR) 40 MG tablet     Melatonin 10 MG TABS tablet     methimazole (TAPAZOLE) 5 MG tablet     nitroGLYcerin (NITROSTAT) 0.4 MG sublingual tablet     Omega-3 Fatty Acids (FISH OIL) 500 MG CAPS     omeprazole (PRILOSEC) 20 MG CR capsule     order for DME     polyethylene glycol 0.4%- propylene glycol 0.3% (SYSTANE) 0.4-0.3 % SOLN ophthalmic solution     Probiotic Product (PROBIOTIC ADVANCED PO)     propranolol (INDERAL LA) 60 MG 24 hr capsule     rOPINIRole (REQUIP) 0.25 MG tablet     sertraline (ZOLOFT) 100 MG tablet     traZODone (DESYREL) 50 MG tablet        Allergies   Allergen Reactions     No Known Drug Allergies          Review of Systems   Constitutional: Negative for fever.   Genitourinary: Positive for difficulty urinating and hematuria.   All other systems reviewed and are negative.      Physical Exam   BP: 150/53  Pulse: 66  Temp: 98  F (36.7  C)  Resp: 12  Height: 144.8 cm (4' 9\")  Weight: 66.2 kg (146 lb)  SpO2: 98 %      Physical Exam   Constitutional: She is oriented to person, place, and time. She appears well-developed and well-nourished. No distress.   HENT:   Head: Normocephalic and atraumatic.   Eyes: No scleral icterus.   Neck: Normal range of motion. Neck supple.   Cardiovascular: Normal rate.    Pulmonary/Chest: Effort normal. No respiratory distress.   Abdominal:   Suprapubic pain     Neurological: She is alert and oriented to person, place, and time.   Skin: Skin is warm and dry. No rash noted. She is not diaphoretic. No erythema. No pallor.       ED Course   11:39 PM  The patient was seen and examined by Andres Scherer MD in [facility].     ED Course "     Procedures             Critical Care time:  none         Results for orders placed or performed during the hospital encounter of 09/10/18   Basic metabolic panel   Result Value Ref Range    Sodium 130 (L) 133 - 144 mmol/L    Potassium 5.3 3.4 - 5.3 mmol/L    Chloride 99 94 - 109 mmol/L    Carbon Dioxide 23 20 - 32 mmol/L    Anion Gap 9 3 - 14 mmol/L    Glucose 159 (H) 70 - 99 mg/dL    Urea Nitrogen 33 (H) 7 - 30 mg/dL    Creatinine 1.85 (H) 0.52 - 1.04 mg/dL    GFR Estimate 26 (L) >60 mL/min/1.7m2    GFR Estimate If Black 31 (L) >60 mL/min/1.7m2    Calcium 8.4 (L) 8.5 - 10.1 mg/dL   CBC with platelets differential   Result Value Ref Range    WBC 10.2 4.0 - 11.0 10e9/L    RBC Count 3.70 (L) 3.8 - 5.2 10e12/L    Hemoglobin 10.3 (L) 11.7 - 15.7 g/dL    Hematocrit 32.6 (L) 35.0 - 47.0 %    MCV 88 78 - 100 fl    MCH 27.8 26.5 - 33.0 pg    MCHC 31.6 31.5 - 36.5 g/dL    RDW 14.3 10.0 - 15.0 %    Platelet Count 214 150 - 450 10e9/L    Diff Method Automated Method     % Neutrophils 81.4 %    % Lymphocytes 7.6 %    % Monocytes 10.8 %    % Eosinophils 0.0 %    % Basophils 0.0 %    % Immature Granulocytes 0.2 %    Nucleated RBCs 0 0 /100    Absolute Neutrophil 8.3 1.6 - 8.3 10e9/L    Absolute Lymphocytes 0.8 0.8 - 5.3 10e9/L    Absolute Monocytes 1.1 0.0 - 1.3 10e9/L    Absolute Eosinophils 0.0 0.0 - 0.7 10e9/L    Absolute Basophils 0.0 0.0 - 0.2 10e9/L    Abs Immature Granulocytes 0.0 0 - 0.4 10e9/L    Absolute Nucleated RBC 0.0             Labs Ordered and Resulted from Time of ED Arrival Up to the Time of Departure from the ED   BASIC METABOLIC PANEL - Abnormal; Notable for the following:        Result Value    Sodium 130 (*)     Glucose 159 (*)     Urea Nitrogen 33 (*)     Creatinine 1.85 (*)     GFR Estimate 26 (*)     GFR Estimate If Black 31 (*)     Calcium 8.4 (*)     All other components within normal limits   CBC WITH PLATELETS DIFFERENTIAL - Abnormal; Notable for the following:     RBC Count 3.70 (*)      Hemoglobin 10.3 (*)     Hematocrit 32.6 (*)     All other components within normal limits   INDWELLING URINARY CATHETER (BAZAN)   DISCONTINUE INDWELLING URINARY CATHETER (BAZAN)   BLADDER SCAN            Assessments & Plan (with Medical Decision Making)   This is a 85-year-old female patient coming into emergency room who thought that she was having urinary retention because she has not urinated since her urologic procedure today.  Her physical exam showed that she had suprapubic tenderness and swelling around her vagina.  Here her bladder is decompressed with no significant urine.  Her creatinine is elevated to 1.8 which is more than double her baseline.  She was started on IV fluids here in the emergency room and admitted to the observation service for continued hydration overnight due to fact that she does have a history of heart failure with preserved ejection fraction.  Urology will follow.    I have reviewed the nursing notes.    I have reviewed the findings, diagnosis, plan and need for follow up with the patient.    New Prescriptions    No medications on file       Final diagnoses:   Acute kidney injury (H)     IEugenio, am serving as a trained medical scribe to document services personally performed by Andres Scherer MD, based on the provider's statements to me.   IAndres MD, was physically present and have reviewed and verified the accuracy of this note documented by Eugenio Marin.    9/10/2018   [facility], [place], EMERGENCY DEPARTMENT     Migue Scherer MD  09/11/18 0247

## 2018-09-11 NOTE — ED TRIAGE NOTES
Pt states she had biopsy's (kidney/ bladder) done today at same day surg, she states before she left clinic she had a blue chux under her that was alittle wet when she woke she thought maybe she had some incontinence, when she got home around 4:30pm she states she went to the bath room and urinate very little and has not been able to urinate since then, pt states the pain in abd is getting intolerable

## 2018-09-11 NOTE — CONSULTS
"Urology Consult    Name: Dimple Oviedo    MRN: [id number]   YOB: 1933       We were asked to see Dimple Oviedo at the request of Dr. Scherer for evaluation and treatment of the following chief complaint.        Chief Complaint:   Clot retention     History is obtained from the patient          History of Present Illness:      Dimple Oviedo is a 85 year old female with a history of HTN, HLD, Afib, MI (12/2017), stress cardiomyopathy (12/2017), GERD, hyperthyroidism, multinodular goiter and urologic history of hematuria and positive cytology who underwent cystoscopy, random bladder biopsy and bilateral URS who comes back to the ED with minimal urine output since surgery. Patient urinated small amount of pink urine prior to 4 pm. No flank pain, no fever or chills, no suprapubic tenderness. I was called because to see in her consult because of CRESENCIO, full bladder and inability to pass catheter x3 due to \"swollen urethra\". Upon interview and talking to the nurse, bladder scan actually showed 50 ml only and patient wasn't in retention, and bedside US by Dr Scherer showed that bladder is decompressed.           Past Medical History:     Past Medical History:   Diagnosis Date     Calculus of kidney      Esophageal reflux      GERD (gastroesophageal reflux disease)      Hyperlipidemia LDL goal <130 5/9/2010     Malignant melanoma of skin of trunk, except scrotum (H)      Nonspecific abnormal finding     has living will 2004 -      Nontoxic multinodular goiter     no further eval /tx rec per pt     Osteopenia      Other psoriasis      Personal history of colonic polyps      PMR (polymyalgia rheumatica) (H)      Stress-induced cardiomyopathy      Undiagnosed cardiac murmurs      Unspecified constipation      Unspecified essential hypertension             Past Surgical History:     Past Surgical History:   Procedure Laterality Date     BIOPSY       C NONSPECIFIC PROCEDURE  2005    colonoscopy polyp repeat 2010 "     COLONOSCOPY  2014     COMBINED CYSTOSCOPY, RETROGRADES, URETEROSCOPY, INSERT STENT Bilateral 4/3/2018    Procedure: COMBINED CYSTOSCOPY, RETROGRADES, URETEROSCOPY, INSERT STENT;;  Surgeon: Stuart King MD;  [facility]     CYSTOSCOPY, BIOPSY BLADDER INSTILL OPTICAL AGENT N/A 4/3/2018    Procedure: CYSTOSCOPY, BIOPSY BLADDER INSTILL OPTICAL AGENT;  Cystoscopy, Blue Light Cystoscopy, Bladder Biopsies, Bilateral Selective ureteral washings for Cytology, Bilateral Retrograde Pyelograms, Bilateral Ureteroscopy;  Surgeon: Stuart King MD;  [facility] OR     CYSTOSCOPY, BIOPSY BLADDER, COMBINED N/A 2/19/2018    Procedure: COMBINED CYSTOSCOPY, BIOPSY BLADDER;  Cystoscopy, Bladder Biopsy;  Surgeon: Kenna La MD;  [facility]     ENDOSCOPIC ULTRASOUND LOWER GASTROINTESTIONAL TRACT (GI) N/A 10/30/2015    Procedure: ENDOSCOPIC ULTRASOUND LOWER GASTROINTESTIONAL TRACT (GI);  Surgeon: Daniel Jean-Baptiste MD;  Location: [facility] OR     EYE SURGERY  12/4/17     LAPAROSCOPIC CHOLECYSTECTOMY WITH CHOLANGIOGRAMS N/A 11/1/2015    Procedure: LAPAROSCOPIC CHOLECYSTECTOMY WITH CHOLANGIOGRAMS;  Surgeon: Tonie Warren MD;  [facility]: [facility]     SURGICAL HISTORY OF -   1996    malignant melanoma     SURGICAL HISTORY OF -   1968    thyroid nodule     SURGICAL HISTORY OF -       D & C            Social History:     Social History   Substance Use Topics     Smoking status: Never Smoker     Smokeless tobacco: Never Used     Alcohol use No      Comment: 6 times per year-one drink            Family History:     Family History   Problem Relation Age of Onset     Cancer Father      dec - esophageal and laryngeal     HEART DISEASE Mother      Respiratory Mother      dec     Breast Cancer Daughter      Other Cancer Daughter      Thyroid Disease Daughter      Asthma Daughter      Hyperlipidemia Son      Diabetes Son             Allergies:     Allergies   Allergen Reactions     No Known Drug  Allergies             Medications:     Current Facility-Administered Medications   Medication     acetaminophen (TYLENOL) tablet 650 mg     furosemide (LASIX) injection 20 mg     irbesartan (AVAPRO) tablet 300 mg     melatonin tablet 10 mg     methimazole (TAPAZOLE) tablet 5 mg     naloxone (NARCAN) injection 0.1-0.4 mg     ondansetron (ZOFRAN-ODT) ODT tab 4 mg    Or     ondansetron (ZOFRAN) injection 4 mg     pantoprazole (PROTONIX) 40 mg IV push injection     propranolol (INDERAL LA) 24 hr capsule 60 mg     rOPINIRole (REQUIP) tablet 0.25 mg     senna-docusate (SENOKOT-S;PERICOLACE) 8.6-50 MG per tablet 1-2 tablet     sertraline (ZOLOFT) tablet 100 mg     sodium chloride 0.9% infusion     traZODone (DESYREL) tablet 50 mg             Review of Systems:    ROS: 10 point ROS neg other than the symptoms noted above in the HPI           Physical Exam:   VS:  T: 98.2    HR: 69    BP: 152/51    RR: 18   GEN:  AOx3.  NAD.    CV:  RRR  LUNGS: Non-labored breathing.   BACK:  No midline or CVA tenderness.  ABD:  Soft.  NT.  ND.  No rebound or guarding.  No masses.  EXT:  Warm, well perfused.    SKIN:  Warm.  Dry.  No rashes.  NEURO:  CN grossly intact.            Data:   All laboratory data reviewed:      Recent Labs  Lab 09/11/18  0612 09/11/18 0045   WBC 12.3* 10.2   HGB 10.0* 10.3*    214       Recent Labs  Lab 09/11/18  0612 09/11/18  0045 09/10/18  0810   * 130*  --    POTASSIUM 5.2 5.3 4.4   CHLORIDE 102 99  --    CO2 19* 23  --    BUN 35* 33*  --    CR 2.21* 1.85*  --    * 159*  --    SHREYA 8.2* 8.4*  --      No lab results found in last 7 days.    Invalid input(s): URINEBLOOD    All pertinent imaging reviewed:    Results for orders placed or performed in visit on 09/10/18   XR Surgery HARPAL L/T 5 Min Fluoro w Stills    Narrative    This exam was marked as non-reportable because it will not be read by a   radiologist or a [place] non-radiologist provider.                      Impression and Plan:    Impression:   Dimple Oviedo is a 85 year old female with low urine output post cystoscopy and bilateral URS  Could be pre-renal vs post-renal due to instrumentation  Doesn't need catheter at this point as her bladder is empty      Plan:     Recommend IV bolus and hydration. If patient remains with low urine ouput please obtain imaging to assess for hydronephrosis                      This patient's exam findings, labs, and imaging discussed with urology staff surgeon Dr. King, who developed the treatment plan.    Haris Garza MD  Urology Resident PGY4

## 2018-09-11 NOTE — H&P
[facility] ED Observation Admission Note    Chief Complaint   Patient presents with     Hematuria     retention post        Assessment/Plan:  1. CRESENCIO: p/t D with decreased UOP. Underwent a blue light cystourethroscopy following intravesical administration of hexaminolevulinate HCl, b/l retrograde pyelograms, b/l ureteroscopy and random bladder biopsies by Dr. Reyes yesterday for positive FISH and recent h/o hematuria. Had very small amount of urine ~ 4/5pm last night and non since then. In ED, HR 60's, /53, RR 12, SaO2 95-98% on RA, Temp 98. Labs show BMP with Na 130, K 5.3, BUN/Creat 33/1.85 (baseline Cr < 1.00), GFR 26, glucose 159 otherwise normal. CBC with WBC 10.2, H/H 10.3/32.6 (baseline 10-12), RBC 3.7 otherwise normal. Per ED staff Urology is aware of patient and will see in AM. Bladder scan in ED for 50ml. Per ED nurse there were 3 unsuccessful attempts to place a goldman. After ED discussion with Urology it was decided to not place goldman. She is being placed in the Observation Unit for CRESENCIO and gentle hydration.  - Bladder scan q4h if no UOP  - Strict I/O  - Urology consult  - IVF w/ NS at 125ml/hr  - Send UA with reflex culture if urine is obtained    2. Borderline Elevated K: K 5.3  - Repeat K now  - If still elevated consider lasix (lasix 20mg daily is a home med but held due to concern for dehydration)    3. Hyponatremia: Na 130 with BUN/Creat 33/1.85, GFR 26 is consistent with dehydration  - Gentle hydration with NS at 100ml/hr    4. Stress Cardiomyopathy: Last Echo on 7/23/18 reports EF of 55-60%, no WMA, normal global function, severe left atrial enlargement, mild aortic insufficiency. Reports some SOB and increase LE edema  - Continuous Telemetry  - BNP  - Consider small dose of IV lasix (takes lasix 20mg po daily at home)  - Echocardiogram to reevaluate function  - Continue with PTA Avapro, Propranolol     5. Nausea/Vomiting: - zofran prn  - Ativan 0.25mg IV x 1 (also to help with restless leg)    6.  Constipation: - Senna-s 1-2 tabs BID    Chronic Medical Problems:  # HTN: - Continue with PTA Avapro, Propranolol     #GERD: - Continue with PTA PPI    # Hyperthyroidism: - Continue with PTA Methamazole, Propranolol     # Depression: - Continue with PTA Zoloft    # RLS: - Continue with PTA Requip        HPI:    Dimple Oviedo is a 85 year old female with a history of HTN, HLD, Afib, MI (12/2017), stress cardiomyopathy (12/2017), GERD, hyperthyroidism, multinodular goiter, PMR who presented to the ED with decreased UOP. She underwent a blue light cystourethroscopy following intravesical administration of hexaminolevulinate HCl, b/l retrograde pyelograms, b/l ureteroscopy and random bladder biopsies by Dr. Reyes yesterday for positive FISH and recent h/o hematuria. She presented to the ED as she had not been able to urinate since 4/5pm last night when she had a small amount of urine with gross blood. She otherwise denies fever, chills, dysuria, nausea/vomiting (except 1 episode right now on Observation Unit), diarrhea, chest pain. She reports constipation actually. She reports SOB after procedure yesterday.     In the ED, HR 60's, /53, RR 12, SaO2 95-98% on RA, Temp 98. Labs show BMP with Na 130, K 5.3, BUN/Creat 33/1.85, GFR 26, glucose 159 otherwise normal. CBC with WBC 10.2, H/H 10.3/32.6, RBC 3.7 otherwise normal. Per ED staff Urology is aware of patient and will see patient in the morning. It is not documented ho. In the ED the patient was given requip 0.25mg po x 2 for restless leg. She was bladder scanned for 50ml. Per ED nurse there were 3 unsuccessful attempts to place a goldman. After ED discussion with Urology it was decided to not place goldman. She is being placed in the Observation Unit for CRESENCIO and gentle hydration.    On admission to the observation unit the patient was throwing up. She feels well immediately after throwing up and states the second dose of Requip made her nauseated. She report  restless leg.     History:    Past Medical History:   Diagnosis Date     Calculus of kidney      Esophageal reflux      GERD (gastroesophageal reflux disease)      Hyperlipidemia LDL goal <130 5/9/2010     Malignant melanoma of skin of trunk, except scrotum (H)      Nonspecific abnormal finding     has living will 2004 -      Nontoxic multinodular goiter     no further eval /tx rec per pt     Osteopenia      Other psoriasis      Personal history of colonic polyps      PMR (polymyalgia rheumatica) (H)      Stress-induced cardiomyopathy      Undiagnosed cardiac murmurs      Unspecified constipation      Unspecified essential hypertension        Past Surgical History:   Procedure Laterality Date     BIOPSY       C NONSPECIFIC PROCEDURE  2005    colonoscopy polyp repeat 2010     COLONOSCOPY  2014     COMBINED CYSTOSCOPY, RETROGRADES, URETEROSCOPY, INSERT STENT Bilateral 4/3/2018    Procedure: COMBINED CYSTOSCOPY, RETROGRADES, URETEROSCOPY, INSERT STENT;;  Surgeon: Stuart King MD;  Location: [facility] OR     CYSTOSCOPY, BIOPSY BLADDER INSTILL OPTICAL AGENT N/A 4/3/2018    Procedure: CYSTOSCOPY, BIOPSY BLADDER INSTILL OPTICAL AGENT;  Cystoscopy, Blue Light Cystoscopy, Bladder Biopsies, Bilateral Selective ureteral washings for Cytology, Bilateral Retrograde Pyelograms, Bilateral Ureteroscopy;  Surgeon: Stuart King MD;  Location: [facility] OR     CYSTOSCOPY, BIOPSY BLADDER, COMBINED N/A 2/19/2018    Procedure: COMBINED CYSTOSCOPY, BIOPSY BLADDER;  Cystoscopy, Bladder Biopsy;  Surgeon: Kenna La MD;  Location: [facility] OR     ENDOSCOPIC ULTRASOUND LOWER GASTROINTESTIONAL TRACT (GI) N/A 10/30/2015    Procedure: ENDOSCOPIC ULTRASOUND LOWER GASTROINTESTIONAL TRACT (GI);  Surgeon: Daniel Jean-Baptiste MD;  Location: [facility] OR     EYE SURGERY  12/4/17     LAPAROSCOPIC CHOLECYSTECTOMY WITH CHOLANGIOGRAMS N/A 11/1/2015    Procedure: LAPAROSCOPIC CHOLECYSTECTOMY WITH CHOLANGIOGRAMS;  Surgeon: Tonie Warren  MD Lian;  Location: [facility] OR     SURGICAL HISTORY OF -   1996    malignant melanoma     SURGICAL HISTORY OF -   1968    thyroid nodule     SURGICAL HISTORY OF -       D & C       Family History   Problem Relation Age of Onset     Cancer Father      dec - esophageal and laryngeal     HEART DISEASE Mother      Respiratory Mother      dec     Breast Cancer Daughter      Other Cancer Daughter      Thyroid Disease Daughter      Asthma Daughter      Hyperlipidemia Son      Diabetes Son        Social History     Social History     Marital status:      Spouse name:      Number of children: 2     Years of education: N/A     Occupational History      Retired     Social History Main Topics     Smoking status: Never Smoker     Smokeless tobacco: Never Used     Alcohol use No      Comment: 6 times per year-one drink     Drug use: No     Sexual activity: Yes     Partners: Male     Other Topics Concern      Service No     Blood Transfusions No     Exercise Yes     curves     Seat Belt Yes     Self-Exams Yes     Parent/Sibling W/ Cabg, Mi Or Angioplasty Before 65f 55m? No     Social History Narrative    December 7, 2009    Balanced Diet - Yes    Osteoporosis Prevention Measures - Dairy servings per day: 2 and Medication/Supplements (See current meds)    Regular Exercise -  Yes Describe curves, walking    Dental Exam - YES - Date: 10/2009    Eye Exam - YES - Date: 2008    Self Breast Exam - Yes    Abuse: Current or Past (Physical, Sexual or Emotional)- No    Do you feel safe in your environment - Yes    Guns stored in the home - No    Sunscreen used - Yes    Seatbelts used - Yes    Lipids -  YES - Date: 11/24/2009    Glucose -  YES - Date: 11/24/2009    Colon Cancer Screening - Colonoscopy 11/18/2005(date completed)    Hemoccults - YES - Date: 10/12/2004    Pap Test -  YES - Date: 09/22/2004    Do you have any concerns about STD's -  No    Mammography - YES - Date: 11/24/2009    DEXA - YES - Date: 11/20/2008     "Immunizations reviewed and up to date - Yes    PAULETTE Spencer CMA                     Current Facility-Administered Medications on File Prior to Encounter:  [COMPLETED] acetaminophen (TYLENOL) tablet 975 mg   [COMPLETED] ceFAZolin (ANCEF) intermittent infusion 2 g in 50 mL dextrose PREMIX   [COMPLETED] hexaminolevulinate (CYSVIEW) bladder imaging solution 100 mg   [DISCONTINUED] ceFAZolin (ANCEF) intermittent infusion 1 g (pre-mix)   [DISCONTINUED] dexamethasone (DECADRON) injection   [DISCONTINUED] ePHEDrine injection   [DISCONTINUED] fentaNYL (PF) (SUBLIMAZE) injection 25-50 mcg   [DISCONTINUED] fentaNYL (PF) (SUBLIMAZE) injection   [DISCONTINUED] iothalamate meglumine (CONRAY) 60 % injection   [DISCONTINUED] lactated ringers infusion   [DISCONTINUED] lactated ringers infusion   [DISCONTINUED] lidocaine (LMX4) kit   [DISCONTINUED] lidocaine BUFFERED 1 % injection 1 mL   [DISCONTINUED] lidocaine injection 2% (MDV)   [DISCONTINUED] meperidine (DEMEROL) injection 12.5 mg   [DISCONTINUED] naloxone (NARCAN) injection 0.1-0.4 mg   [DISCONTINUED] ondansetron (ZOFRAN) injection 4 mg   [DISCONTINUED] ondansetron (ZOFRAN) injection   [DISCONTINUED] ondansetron (ZOFRAN-ODT) ODT tab 4 mg   [DISCONTINUED] ORAL Pain Medications -  may administer as ordered by surgeon for take home use   [DISCONTINUED] phenylephrine (ALEXANDER-SYNEPHRINE) injection 1 mg   [DISCONTINUED] prochlorperazine (COMPAZINE) injection 5 mg   [DISCONTINUED] propofol (DIPRIVAN) infusion   [DISCONTINUED] propofol (DIPRIVAN) injection 10 mg/mL vial   [DISCONTINUED] sodium chloride (PF) 0.9% PF flush 3 mL   [DISCONTINUED] sodium chloride (PF) 0.9% PF flush 3 mL     Current Outpatient Prescriptions on File Prior to Encounter:  Acetaminophen (TYLENOL ARTHRITIS PAIN PO) Take 650 mg by mouth as needed   alendronate (FOSAMAX) 70 MG tablet TAKE 1 TABLET EVERY 7 DAYS AT LEAST 60 MINUTES BEFORE BREAKFAST AS DIRECTED \"SEE PACKAGE FOR ADDITIONAL INSTRUCTIONS\" (Patient taking " "differently: TAKE 1 TABLET EVERY 7 DAYS AT LEAST 60 MINUTES BEFORE BREAKFAST AS DIRECTED \"SEE PACKAGE FOR ADDITIONAL INSTRUCTIONS\" TUESDAY)   ASPIRIN PO Take 81 mg by mouth At Bedtime   Calcium Citrate-Vitamin D (CALCIUM + D PO) Take by mouth 2 times daily   colestipol (COLESTID) 1 g tablet 1 TABLET ( 1 g)  BID  - TAKE OTHER MEDS 1 HOUR BEFORE OR 4 HOURS AFTER COLESID   cycloSPORINE (RESTASIS) 0.05 % ophthalmic emulsion Place 1 drop into both eyes 2 times daily   DiazePAM (VALIUM PO) Take 2 mg by mouth as needed for anxiety    Ferrous Sulfate (IRON SUPPLEMENT PO) Take 325 mg by mouth every morning    furosemide (LASIX) 20 MG tablet Take 1 tablet (20 mg) by mouth every morning   irbesartan (AVAPRO) 300 MG tablet TAKE 1 TABLET EVERY DAY (Patient taking differently: Take 300 mg by mouth every morning TAKE 1 TABLET EVERY DAY)   lovastatin (MEVACOR) 40 MG tablet TAKE 1 TABLET AT BEDTIME (HYPERLIPIDEMIA LDL GOAL BELOW 130)   Melatonin 10 MG TABS tablet Take 10 mg by mouth as needed for sleep    methimazole (TAPAZOLE) 5 MG tablet Take 1 tablet (5 mg) by mouth daily (Patient taking differently: Take 5 mg by mouth every morning )   nitroGLYcerin (NITROSTAT) 0.4 MG sublingual tablet For chest pain place 1 tablet under the tongue every 5 minutes for 3 doses. If symptoms persist 5 minutes after 1st dose call 911.   Omega-3 Fatty Acids (FISH OIL) 500 MG CAPS Take 1 capsule by mouth 2 times daily    omeprazole (PRILOSEC) 20 MG CR capsule Take 1 capsule (20 mg) by mouth daily (Patient taking differently: Take 20 mg by mouth every morning )   order for DME Equipment being ordered: Knee high compression for B LE 20-30mmHg, Velcro units for night time (consider hybrid sock as foot swelling is less than leg swelling), Donning device   polyethylene glycol 0.4%- propylene glycol 0.3% (SYSTANE) 0.4-0.3 % SOLN ophthalmic solution Place 1 drop into both eyes 2 times daily    Probiotic Product (PROBIOTIC ADVANCED PO) Take by mouth every " morning    propranolol (INDERAL LA) 60 MG 24 hr capsule Take 1 capsule (60 mg) by mouth daily (Patient taking differently: Take 60 mg by mouth every morning )   rOPINIRole (REQUIP) 0.25 MG tablet TAKE 1 TABLET(0.25 MG) BY MOUTH EVERY NIGHT AS NEEDED (Patient taking differently: TAKE 1 TABLET(0.25 MG) BY MOUTH EVERY EVENING)   sertraline (ZOLOFT) 100 MG tablet Take 1 tablet (100 mg) by mouth daily (Patient taking differently: Take 100 mg by mouth every morning )   traZODone (DESYREL) 50 MG tablet TAKE 1 TABLET(50 MG) BY MOUTH EVERY NIGHT AS NEEDED FOR SLEEP (Patient taking differently: TAKE 1/2 TABLET(25 MG)  AT BEDTIME)       Data:    Results for orders placed or performed during the hospital encounter of 09/10/18   Basic metabolic panel   Result Value Ref Range    Sodium 130 (L) 133 - 144 mmol/L    Potassium 5.3 3.4 - 5.3 mmol/L    Chloride 99 94 - 109 mmol/L    Carbon Dioxide 23 20 - 32 mmol/L    Anion Gap 9 3 - 14 mmol/L    Glucose 159 (H) 70 - 99 mg/dL    Urea Nitrogen 33 (H) 7 - 30 mg/dL    Creatinine 1.85 (H) 0.52 - 1.04 mg/dL    GFR Estimate 26 (L) >60 mL/min/1.7m2    GFR Estimate If Black 31 (L) >60 mL/min/1.7m2    Calcium 8.4 (L) 8.5 - 10.1 mg/dL   CBC with platelets differential   Result Value Ref Range    WBC 10.2 4.0 - 11.0 10e9/L    RBC Count 3.70 (L) 3.8 - 5.2 10e12/L    Hemoglobin 10.3 (L) 11.7 - 15.7 g/dL    Hematocrit 32.6 (L) 35.0 - 47.0 %    MCV 88 78 - 100 fl    MCH 27.8 26.5 - 33.0 pg    MCHC 31.6 31.5 - 36.5 g/dL    RDW 14.3 10.0 - 15.0 %    Platelet Count 214 150 - 450 10e9/L    Diff Method Automated Method     % Neutrophils 81.4 %    % Lymphocytes 7.6 %    % Monocytes 10.8 %    % Eosinophils 0.0 %    % Basophils 0.0 %    % Immature Granulocytes 0.2 %    Nucleated RBCs 0 0 /100    Absolute Neutrophil 8.3 1.6 - 8.3 10e9/L    Absolute Lymphocytes 0.8 0.8 - 5.3 10e9/L    Absolute Monocytes 1.1 0.0 - 1.3 10e9/L    Absolute Eosinophils 0.0 0.0 - 0.7 10e9/L    Absolute Basophils 0.0 0.0 - 0.2 10e9/L     Abs Immature Granulocytes 0.0 0 - 0.4 10e9/L    Absolute Nucleated RBC 0.0           ROS:    Review Of Systems  Skin: negative  Eyes: negative  Ears/Nose/Throat: negative  Respiratory: Shortness of breath- worse, no cough  Cardiovascular: negative  Gastrointestinal: nausea, vomiting and abdominal pain, constipation  Genitourinary: positive for hematuria, retention and decreased urinary stream, negative for and dysuria  Musculoskeletal: negative  Neurologic: negative  Psychiatric: negative  Hematologic/Lymphatic/Immunologic: negative for, chills and fever  Endocrine: negative  PCP: Dr. Zapien    10 point ROS negative other than the symptoms noted above.      Exam:  Vitals:  B/P: 150/53, T: 98, P: 66, R: 12    Constitutional: alert, no distress, cooperative and over weight  Head: Normocephalic. No masses, lesions, tenderness or abnormalities  Neck: Neck supple. No adenopathy. Thyroid symmetric, normal size,, Carotids without bruits.  ENT: ENT exam normal, no neck nodes or sinus tenderness  Cardiovascular: PMI normal. No lifts, heaves, or thrills. RRR. No murmurs, clicks gallops or rub  Respiratory: intermittent wheezing on exam  Gastrointestinal: Abdomen soft, TTP suprapubic area. BS normal. No masses, organomegaly  : Deferred  Musculoskeletal: extremities normal- no gross deformities noted and normal muscle tone  Ext: BLE edema with pitting  Skin: no suspicious lesions or rashes  Neurologic: Gait normal. Reflexes normal and symmetric. Sensation grossly WNL.  Psychiatric: mentation appears normal and affect normal/bright  Hematologic/Lymphatic/Immunologic: normal ant/post cervical, axillary, supraclavicular and inguinal nodes    Consults: urology  FEN: low Na diet  DVT prophylaxis: SCD  GI prophylaxis: protonix  BM prophylaxis: senna - s  Code Status: full code  Disposition: home once Urology consultation completed with dispo plan, voiding appropriately, improved kidney function, stable vitals    Signed:  Pamella  Rosemarie Louise PA-C   September 11, 2018 at 5:07 AM

## 2018-09-11 NOTE — PLAN OF CARE
Problem: Patient Care Overview  Goal: Plan of Care/Patient Progress Review  Outcome: No Change  Afebrile, VSS on RA.  Denies pain or nausea.  Alert and oriented x4.  Bladder scan showed 13 ml.  Pt. Has not voided this shift, provider aware.  Tolerating regular diet.  Pt. will transfer to medicine unit when bed is available.  Continue plan of care.

## 2018-09-11 NOTE — PROGRESS NOTES
"Observation Unit Transfer Summary     Patient ID:  Dimple Oviedo  MRN: [id number]  85 year old  YOB: 1933    Observation Admit Date: 9/10/2018    ED Admitting Attending: Juju Schumacher MD    Transfer Date and Time: September 11, 2018 at 10:23 AM     Transferring Observation Provider: NELSON Salazar CNP    Admission Diagnoses:     1. Acute kidney injury (H)        Transfer Diagnoses:    Anuria   Acute kidney injury     Emergency Department and Observation Course:      1. CRESENCIO: p/t D with decreased UOP. Underwent a blue light cystourethroscopy following intravesical administration of hexaminolevulinate HCl, b/l retrograde pyelograms, b/l ureteroscopy and random bladder biopsies by Dr. Reyes yesterday for positive FISH and recent h/o hematuria. Had very small amount of urine ~ 4/5pm last night and non since then.  Bladder scan in ED for 50ml. Per ED nurse there were 3 unsuccessful attempts to place a goldman. After ED discussion with Urology it was decided to not place goldman. Patient continued to have no urine output. Cr increased to double her baseline to 2.21. WBC 12.2 . K 5.3. Discussed with urology who recommended a CT abd/pelvis and transfer to urology service. UA pending at time of discharge.      At this time the patient has failed observation management due to anuria , elevated creatinine and potassium and will be transferred to inpatient status.    Consults: Urology     DATA:    Transfer Exam:    /51  Pulse 69  Temp 98.2  F (36.8  C) (Oral)  Resp 18  Ht 1.448 m (4' 9\")  Wt 66.2 kg (146 lb)  SpO2 95%  BMI 31.59 kg/m2  Constitutional: She is oriented to person, place, and time. She appears well-developed and well-nourished. No distress.   HENT:   Head: Normocephalic and atraumatic.   Eyes: No scleral icterus.   Neck: Normal range of motion. Neck supple.   Cardiovascular: Normal rate.    Pulmonary/Chest: Effort normal. No respiratory distress.   Abdominal:   Suprapubic pain    Current " "Medications:    No current outpatient prescriptions on file.       Medications Prior to Admission:    Prescriptions Prior to Admission   Medication Sig Dispense Refill Last Dose     Acetaminophen (TYLENOL ARTHRITIS PAIN PO) Take 650 mg by mouth as needed   Past Week at Unknown time     alendronate (FOSAMAX) 70 MG tablet TAKE 1 TABLET EVERY 7 DAYS AT LEAST 60 MINUTES BEFORE BREAKFAST AS DIRECTED \"SEE PACKAGE FOR ADDITIONAL INSTRUCTIONS\" (Patient taking differently: TAKE 1 TABLET EVERY 7 DAYS AT LEAST 60 MINUTES BEFORE BREAKFAST AS DIRECTED \"SEE PACKAGE FOR ADDITIONAL INSTRUCTIONS\" TUESDAY) 12 tablet 0 Past Week at Unknown time     ASPIRIN PO Take 81 mg by mouth At Bedtime   Past Week at Unknown time     Calcium Citrate-Vitamin D (CALCIUM + D PO) Take by mouth 2 times daily   Past Month at Unknown time     colestipol (COLESTID) 1 g tablet 1 TABLET ( 1 g)  BID  - TAKE OTHER MEDS 1 HOUR BEFORE OR 4 HOURS AFTER COLESID  2 9/9/2018 at Unknown time     cycloSPORINE (RESTASIS) 0.05 % ophthalmic emulsion Place 1 drop into both eyes 2 times daily   9/9/2018 at Unknown time     DiazePAM (VALIUM PO) Take 2 mg by mouth as needed for anxiety    Unknown at Unknown time     Ferrous Sulfate (IRON SUPPLEMENT PO) Take 325 mg by mouth every morning    Past Month at Unknown time     furosemide (LASIX) 20 MG tablet Take 1 tablet (20 mg) by mouth every morning   9/9/2018 at Unknown time     irbesartan (AVAPRO) 300 MG tablet TAKE 1 TABLET EVERY DAY (Patient taking differently: Take 300 mg by mouth every morning TAKE 1 TABLET EVERY DAY) 90 tablet 2 Unknown at Unknown time     lovastatin (MEVACOR) 40 MG tablet TAKE 1 TABLET AT BEDTIME (HYPERLIPIDEMIA LDL GOAL BELOW 130) 90 tablet 2 Taking     Melatonin 10 MG TABS tablet Take 10 mg by mouth as needed for sleep    Past Week at Unknown time     methimazole (TAPAZOLE) 5 MG tablet Take 1 tablet (5 mg) by mouth daily (Patient taking differently: Take 5 mg by mouth every morning ) 90 tablet 1 " 9/10/2018 at Unknown time     nitroGLYcerin (NITROSTAT) 0.4 MG sublingual tablet For chest pain place 1 tablet under the tongue every 5 minutes for 3 doses. If symptoms persist 5 minutes after 1st dose call 911. 25 tablet 3 Unknown at Unknown time     Omega-3 Fatty Acids (FISH OIL) 500 MG CAPS Take 1 capsule by mouth 2 times daily    Past Week at Unknown time     omeprazole (PRILOSEC) 20 MG CR capsule Take 1 capsule (20 mg) by mouth daily (Patient taking differently: Take 20 mg by mouth every morning ) 180 capsule 3 9/10/2018 at 0600     order for DME Equipment being ordered: Knee high compression for B LE 20-30mmHg, Velcro units for night time (consider hybrid sock as foot swelling is less than leg swelling), Donning device 1 each 2 Taking     polyethylene glycol 0.4%- propylene glycol 0.3% (SYSTANE) 0.4-0.3 % SOLN ophthalmic solution Place 1 drop into both eyes 2 times daily    9/9/2018 at 0900     Probiotic Product (PROBIOTIC ADVANCED PO) Take by mouth every morning    Past Week at Unknown time     propranolol (INDERAL LA) 60 MG 24 hr capsule Take 1 capsule (60 mg) by mouth daily (Patient taking differently: Take 60 mg by mouth every morning ) 90 capsule 1 9/9/2018 at 2100     rOPINIRole (REQUIP) 0.25 MG tablet TAKE 1 TABLET(0.25 MG) BY MOUTH EVERY NIGHT AS NEEDED (Patient taking differently: TAKE 1 TABLET(0.25 MG) BY MOUTH EVERY EVENING) 90 tablet 0 Taking     sertraline (ZOLOFT) 100 MG tablet Take 1 tablet (100 mg) by mouth daily (Patient taking differently: Take 100 mg by mouth every morning ) 90 tablet 1 9/9/2018 at 0900     traZODone (DESYREL) 50 MG tablet TAKE 1 TABLET(50 MG) BY MOUTH EVERY NIGHT AS NEEDED FOR SLEEP (Patient taking differently: TAKE 1/2 TABLET(25 MG)  AT BEDTIME) 90 tablet 1 9/9/2018 at 2100       Significant Diagnostic Studies:     Results for orders placed or performed during the hospital encounter of 09/10/18   XR Chest 2 Views    Narrative    Exam: XR CHEST 2 VW, 9/11/2018 8:20  AM    Indication: sob;     Comparison: 7/16/2018     Findings:   PA and lateral views of the chest. The trachea is midline. The heart  is prominent compared to prior study. The pulmonary vasculature is  distinct. Mild pulmonary vascular engorgement and interstitial  prominence. Fluid tracking along bilateral major fissures. No focal  airspace opacity. Small bilateral pleural effusions. No pneumothorax.  The upper abdomen is unremarkable. Degenerative changes of the spine.      Impression    Impression:   1. The cardiac silhouette is prominent compared to the prior study,  likely due to technique.  2. Pulmonary vascular engorgement and interstitial prominence, may  represent early pulmonary edema.  3. Bilateral small pleural effusions.    I have personally reviewed the examination and initial interpretation  and I agree with the findings.    JANET BRAGG, DO   Basic metabolic panel   Result Value Ref Range    Sodium 130 (L) 133 - 144 mmol/L    Potassium 5.3 3.4 - 5.3 mmol/L    Chloride 99 94 - 109 mmol/L    Carbon Dioxide 23 20 - 32 mmol/L    Anion Gap 9 3 - 14 mmol/L    Glucose 159 (H) 70 - 99 mg/dL    Urea Nitrogen 33 (H) 7 - 30 mg/dL    Creatinine 1.85 (H) 0.52 - 1.04 mg/dL    GFR Estimate 26 (L) >60 mL/min/1.7m2    GFR Estimate If Black 31 (L) >60 mL/min/1.7m2    Calcium 8.4 (L) 8.5 - 10.1 mg/dL   CBC with platelets differential   Result Value Ref Range    WBC 10.2 4.0 - 11.0 10e9/L    RBC Count 3.70 (L) 3.8 - 5.2 10e12/L    Hemoglobin 10.3 (L) 11.7 - 15.7 g/dL    Hematocrit 32.6 (L) 35.0 - 47.0 %    MCV 88 78 - 100 fl    MCH 27.8 26.5 - 33.0 pg    MCHC 31.6 31.5 - 36.5 g/dL    RDW 14.3 10.0 - 15.0 %    Platelet Count 214 150 - 450 10e9/L    Diff Method Automated Method     % Neutrophils 81.4 %    % Lymphocytes 7.6 %    % Monocytes 10.8 %    % Eosinophils 0.0 %    % Basophils 0.0 %    % Immature Granulocytes 0.2 %    Nucleated RBCs 0 0 /100    Absolute Neutrophil 8.3 1.6 - 8.3 10e9/L    Absolute Lymphocytes 0.8  0.8 - 5.3 10e9/L    Absolute Monocytes 1.1 0.0 - 1.3 10e9/L    Absolute Eosinophils 0.0 0.0 - 0.7 10e9/L    Absolute Basophils 0.0 0.0 - 0.2 10e9/L    Abs Immature Granulocytes 0.0 0 - 0.4 10e9/L    Absolute Nucleated RBC 0.0    Comprehensive metabolic panel   Result Value Ref Range    Sodium 132 (L) 133 - 144 mmol/L    Potassium 5.2 3.4 - 5.3 mmol/L    Chloride 102 94 - 109 mmol/L    Carbon Dioxide 19 (L) 20 - 32 mmol/L    Anion Gap 11 3 - 14 mmol/L    Glucose 101 (H) 70 - 99 mg/dL    Urea Nitrogen 35 (H) 7 - 30 mg/dL    Creatinine 2.21 (H) 0.52 - 1.04 mg/dL    GFR Estimate 21 (L) >60 mL/min/1.7m2    GFR Estimate If Black 26 (L) >60 mL/min/1.7m2    Calcium 8.2 (L) 8.5 - 10.1 mg/dL    Bilirubin Total 0.7 0.2 - 1.3 mg/dL    Albumin 3.1 (L) 3.4 - 5.0 g/dL    Protein Total 6.5 (L) 6.8 - 8.8 g/dL    Alkaline Phosphatase 129 40 - 150 U/L    ALT 92 (H) 0 - 50 U/L     (H) 0 - 45 U/L   CBC with platelets differential   Result Value Ref Range    WBC 12.3 (H) 4.0 - 11.0 10e9/L    RBC Count 3.65 (L) 3.8 - 5.2 10e12/L    Hemoglobin 10.0 (L) 11.7 - 15.7 g/dL    Hematocrit 31.7 (L) 35.0 - 47.0 %    MCV 87 78 - 100 fl    MCH 27.4 26.5 - 33.0 pg    MCHC 31.5 31.5 - 36.5 g/dL    RDW 14.5 10.0 - 15.0 %    Platelet Count 202 150 - 450 10e9/L    Diff Method Automated Method     % Neutrophils 80.0 %    % Lymphocytes 8.6 %    % Monocytes 11.1 %    % Eosinophils 0.0 %    % Basophils 0.1 %    % Immature Granulocytes 0.2 %    Nucleated RBCs 0 0 /100    Absolute Neutrophil 9.9 (H) 1.6 - 8.3 10e9/L    Absolute Lymphocytes 1.1 0.8 - 5.3 10e9/L    Absolute Monocytes 1.4 (H) 0.0 - 1.3 10e9/L    Absolute Eosinophils 0.0 0.0 - 0.7 10e9/L    Absolute Basophils 0.0 0.0 - 0.2 10e9/L    Abs Immature Granulocytes 0.0 0 - 0.4 10e9/L    Absolute Nucleated RBC 0.0    Nt probnp inpatient   Result Value Ref Range    N-Terminal Pro BNP Inpatient 93931 (H) 0 - 1800 pg/mL   Urology IP Consult: post urologic procedure with CRESENCIO and decreased UOP. unable  "to place goldman; Consultant may enter orders: Yes; Patient to be seen: Routine - within 24 hours    Narrative    Haris Garza MD     9/11/2018  8:13 AM  Urology Consult    Name: Dimple Oviedo    MRN: [id number]   YOB: 1933       We were asked to see Dimple Oviedo at the request of Dr. Scherer   for evaluation and treatment of the following chief complaint.        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 Patient urinated small amount   of pink urine prior to 4 pm. No flank pain, no fever or chills,   no suprapubic tenderness. I was called because to see in her   consult because of CRESENCIO, full bladder and inability to pass   catheter x3 due to \"swollen urethra\". Upon interview and talking   to the nurse, bladder scan actually showed 50 ml only and patient   wasn't in retention, and bedside US by Dr Scherer showed that   bladder is decompressed.           Past Medical History:     Past Medical History:   Diagnosis Date     Calculus of kidney      Esophageal reflux      GERD (gastroesophageal reflux disease)      Hyperlipidemia LDL goal <130 5/9/2010     Malignant melanoma of skin of trunk, except scrotum (H)      Nonspecific abnormal finding     has living will 2004 -      Nontoxic multinodular goiter     no further eval /tx rec per pt     Osteopenia      Other psoriasis      Personal history of colonic polyps      PMR (polymyalgia rheumatica) (H)      Stress-induced cardiomyopathy      Undiagnosed cardiac murmurs      Unspecified constipation      Unspecified essential " hypertension             Past Surgical History:     Past Surgical History:   Procedure Laterality Date     BIOPSY       C NONSPECIFIC PROCEDURE  2005    colonoscopy polyp repeat 2010     COLONOSCOPY  2014     COMBINED CYSTOSCOPY, RETROGRADES, URETEROSCOPY, INSERT STENT   Bilateral 4/3/2018    Procedure: COMBINED CYSTOSCOPY, RETROGRADES, URETEROSCOPY,   INSERT STENT;;  Surgeon: Stuart King MD;  Location:   [facility] OR     CYSTOSCOPY, BIOPSY BLADDER INSTILL OPTICAL AGENT N/A 4/3/2018    Procedure: CYSTOSCOPY, BIOPSY BLADDER INSTILL OPTICAL AGENT;    Cystoscopy, Blue Light Cystoscopy, Bladder Biopsies, Bilateral   Selective ureteral washings for Cytology, Bilateral Retrograde   Pyelograms, Bilateral Ureteroscopy;  Surgeon: Stuart King MD;  Location: [facility] OR     CYSTOSCOPY, BIOPSY BLADDER, COMBINED N/A 2/19/2018    Procedure: COMBINED CYSTOSCOPY, BIOPSY BLADDER;  Cystoscopy,   Bladder Biopsy;  Surgeon: Kenna La MD;  Location:   [facility] OR     ENDOSCOPIC ULTRASOUND LOWER GASTROINTESTIONAL TRACT (GI) N/A   10/30/2015    Procedure: ENDOSCOPIC ULTRASOUND LOWER GASTROINTESTIONAL TRACT   (GI);  Surgeon: Daniel Jean-Baptiste MD;  Location: [facility] OR     EYE SURGERY  12/4/17     LAPAROSCOPIC CHOLECYSTECTOMY WITH CHOLANGIOGRAMS N/A 11/1/2015    Procedure: LAPAROSCOPIC CHOLECYSTECTOMY WITH CHOLANGIOGRAMS;    Surgeon: Tonie Warren MD;  Location: [facility] OR     SURGICAL HISTORY OF -   1996    malignant melanoma     SURGICAL HISTORY OF -   1968    thyroid nodule     SURGICAL HISTORY OF -       D & C            Social History:     Social History   Substance Use Topics     Smoking status: Never Smoker     Smokeless tobacco: Never Used     Alcohol use No      Comment: 6 times per year-one drink            Family History:     Family History   Problem Relation Age of Onset     Cancer Father      dec - esophageal and laryngeal     HEART DISEASE Mother      Respiratory Mother      dec     Breast Cancer  Daughter      Other Cancer Daughter      Thyroid Disease Daughter      Asthma Daughter      Hyperlipidemia Son      Diabetes Son             Allergies:     Allergies   Allergen Reactions     No Known Drug Allergies             Medications:     Current Facility-Administered Medications   Medication     acetaminophen (TYLENOL) tablet 650 mg     furosemide (LASIX) injection 20 mg     irbesartan (AVAPRO) tablet 300 mg     melatonin tablet 10 mg     methimazole (TAPAZOLE) tablet 5 mg     naloxone (NARCAN) injection 0.1-0.4 mg     ondansetron (ZOFRAN-ODT) ODT tab 4 mg    Or     ondansetron (ZOFRAN) injection 4 mg     pantoprazole (PROTONIX) 40 mg IV push injection     propranolol (INDERAL LA) 24 hr capsule 60 mg     rOPINIRole (REQUIP) tablet 0.25 mg     senna-docusate (SENOKOT-S;PERICOLACE) 8.6-50 MG per tablet 1-2   tablet     sertraline (ZOLOFT) tablet 100 mg     sodium chloride 0.9% infusion     traZODone (DESYREL) tablet 50 mg             Review of Systems:    ROS: 10 point ROS neg other than the symptoms noted above in the   HPI           Physical Exam:   VS:  T: 98.2    HR: 69    BP: 152/51    RR: 18   GEN:  AOx3.  NAD.    CV:  RRR  LUNGS: Non-labored breathing.   BACK:  No midline or CVA tenderness.  ABD:  Soft.  NT.  ND.  No rebound or guarding.  No masses.  EXT:  Warm, well perfused.    SKIN:  Warm.  Dry.  No rashes.  NEURO:  CN grossly intact.            Data:   All laboratory data reviewed:      Recent Labs  Lab 09/11/18  0612 09/11/18  0045   WBC 12.3* 10.2   HGB 10.0* 10.3*    214       Recent Labs  Lab 09/11/18  0612 09/11/18  0045 09/10/18  0810   * 130*  --    POTASSIUM 5.2 5.3 4.4   CHLORIDE 102 99  --    CO2 19* 23  --    BUN 35* 33*  --    CR 2.21* 1.85*  --    * 159*  --    SHREYA 8.2* 8.4*  --      No lab results found in last 7 days.    Invalid input(s): URINEBLOOD    All pertinent imaging reviewed:    Results for orders placed or performed in visit on 09/10/18   XR Surgery HARPAL  L/T 5 Min Fluoro w Stills    Narrative    This exam was marked as non-reportable because it will not be   read by a   radiologist or a [facility] non-radiologist provider.                      Impression and Plan:   Impression:   Dimple Oviedo is a 85 year old female with low urine output   post cystoscopy and bilateral URS  Could be pre-renal vs post-renal due to instrumentation  Doesn't need catheter at this point as her bladder is empty      Plan:     Recommend IV bolus and hydration. If patient remains with low   urine ouput please obtain imaging to assess for hydronephrosis                      This patient's exam findings, labs, and imaging discussed with   urology staff surgeon Dr. King, who developed the treatment   plan.    Haris Garza MD  Urology Resident PGY4              Signed:  Kerry Bowser  September 11, 2018 at 10:23 AM

## 2018-09-12 ENCOUNTER — ANESTHESIA EVENT (OUTPATIENT)
Dept: SURGERY | Facility: CLINIC | Age: 83
DRG: 694 | End: 2018-09-12
Payer: MEDICARE

## 2018-09-12 ENCOUNTER — ANESTHESIA (OUTPATIENT)
Dept: SURGERY | Facility: CLINIC | Age: 83
DRG: 694 | End: 2018-09-12
Payer: MEDICARE

## 2018-09-12 ENCOUNTER — APPOINTMENT (OUTPATIENT)
Dept: GENERAL RADIOLOGY | Facility: CLINIC | Age: 83
DRG: 694 | End: 2018-09-12
Attending: UROLOGY
Payer: MEDICARE

## 2018-09-12 LAB
ALBUMIN UR-MCNC: 10 MG/DL
ANION GAP SERPL CALCULATED.3IONS-SCNC: 10 MMOL/L (ref 3–14)
APPEARANCE UR: CLEAR
BACTERIA #/AREA URNS HPF: ABNORMAL /HPF
BILIRUB UR QL STRIP: NEGATIVE
BUN SERPL-MCNC: 52 MG/DL (ref 7–30)
CALCIUM SERPL-MCNC: 8.2 MG/DL (ref 8.5–10.1)
CHLORIDE SERPL-SCNC: 100 MMOL/L (ref 94–109)
CO2 SERPL-SCNC: 22 MMOL/L (ref 20–32)
COLOR UR AUTO: ABNORMAL
COPATH REPORT: NORMAL
CREAT SERPL-MCNC: 3.87 MG/DL (ref 0.52–1.04)
ERYTHROCYTE [DISTWIDTH] IN BLOOD BY AUTOMATED COUNT: 14.8 % (ref 10–15)
GFR SERPL CREATININE-BSD FRML MDRD: 11 ML/MIN/1.7M2
GLUCOSE BLDC GLUCOMTR-MCNC: 99 MG/DL (ref 70–99)
GLUCOSE SERPL-MCNC: 110 MG/DL (ref 70–99)
GLUCOSE UR STRIP-MCNC: NEGATIVE MG/DL
HCT VFR BLD AUTO: 31.8 % (ref 35–47)
HGB BLD-MCNC: 10 G/DL (ref 11.7–15.7)
HGB UR QL STRIP: ABNORMAL
KETONES UR STRIP-MCNC: NEGATIVE MG/DL
LEUKOCYTE ESTERASE UR QL STRIP: ABNORMAL
MCH RBC QN AUTO: 27.5 PG (ref 26.5–33)
MCHC RBC AUTO-ENTMCNC: 31.4 G/DL (ref 31.5–36.5)
MCV RBC AUTO: 88 FL (ref 78–100)
NITRATE UR QL: NEGATIVE
PH UR STRIP: 5 PH (ref 5–7)
PLATELET # BLD AUTO: 210 10E9/L (ref 150–450)
POTASSIUM SERPL-SCNC: 4 MMOL/L (ref 3.4–5.3)
POTASSIUM SERPL-SCNC: 4.8 MMOL/L (ref 3.4–5.3)
POTASSIUM SERPL-SCNC: 5.2 MMOL/L (ref 3.4–5.3)
POTASSIUM SERPL-SCNC: 5.5 MMOL/L (ref 3.4–5.3)
POTASSIUM SERPL-SCNC: 5.7 MMOL/L (ref 3.4–5.3)
RBC # BLD AUTO: 3.63 10E12/L (ref 3.8–5.2)
RBC #/AREA URNS AUTO: 5 /HPF (ref 0–2)
SODIUM SERPL-SCNC: 131 MMOL/L (ref 133–144)
SOURCE: ABNORMAL
SP GR UR STRIP: 1.01 (ref 1–1.03)
UROBILINOGEN UR STRIP-MCNC: NORMAL MG/DL (ref 0–2)
WBC # BLD AUTO: 13.1 10E9/L (ref 4–11)
WBC #/AREA URNS AUTO: 7 /HPF (ref 0–5)

## 2018-09-12 PROCEDURE — 93005 ELECTROCARDIOGRAM TRACING: CPT

## 2018-09-12 PROCEDURE — 12000008 ZZH R&B INTERMEDIATE UMMC

## 2018-09-12 PROCEDURE — 0T788DZ DILATION OF BILATERAL URETERS WITH INTRALUMINAL DEVICE, VIA NATURAL OR ARTIFICIAL OPENING ENDOSCOPIC: ICD-10-PCS | Performed by: UROLOGY

## 2018-09-12 PROCEDURE — 85027 COMPLETE CBC AUTOMATED: CPT | Performed by: STUDENT IN AN ORGANIZED HEALTH CARE EDUCATION/TRAINING PROGRAM

## 2018-09-12 PROCEDURE — 93010 ELECTROCARDIOGRAM REPORT: CPT | Mod: 76 | Performed by: INTERNAL MEDICINE

## 2018-09-12 PROCEDURE — 25000132 ZZH RX MED GY IP 250 OP 250 PS 637: Mod: GY | Performed by: PHYSICIAN ASSISTANT

## 2018-09-12 PROCEDURE — 25500064 ZZH RX 255 OP 636: Performed by: UROLOGY

## 2018-09-12 PROCEDURE — 40000277 XR SURGERY CARM FLUORO LESS THAN 5 MIN W STILLS: Mod: TC

## 2018-09-12 PROCEDURE — 84132 ASSAY OF SERUM POTASSIUM: CPT | Performed by: STUDENT IN AN ORGANIZED HEALTH CARE EDUCATION/TRAINING PROGRAM

## 2018-09-12 PROCEDURE — 37000008 ZZH ANESTHESIA TECHNICAL FEE, 1ST 30 MIN: Performed by: UROLOGY

## 2018-09-12 PROCEDURE — C2617 STENT, NON-COR, TEM W/O DEL: HCPCS | Performed by: UROLOGY

## 2018-09-12 PROCEDURE — 25000125 ZZHC RX 250: Performed by: NURSE ANESTHETIST, CERTIFIED REGISTERED

## 2018-09-12 PROCEDURE — 25000128 H RX IP 250 OP 636: Performed by: PHYSICIAN ASSISTANT

## 2018-09-12 PROCEDURE — 84132 ASSAY OF SERUM POTASSIUM: CPT | Performed by: PHYSICIAN ASSISTANT

## 2018-09-12 PROCEDURE — 36415 COLL VENOUS BLD VENIPUNCTURE: CPT | Performed by: PHYSICIAN ASSISTANT

## 2018-09-12 PROCEDURE — 87086 URINE CULTURE/COLONY COUNT: CPT | Performed by: UROLOGY

## 2018-09-12 PROCEDURE — 36000055 ZZH SURGERY LEVEL 2 W FLUORO 1ST 30 MIN - UMMC: Performed by: UROLOGY

## 2018-09-12 PROCEDURE — A9270 NON-COVERED ITEM OR SERVICE: HCPCS | Mod: GY | Performed by: PHYSICIAN ASSISTANT

## 2018-09-12 PROCEDURE — 80048 BASIC METABOLIC PNL TOTAL CA: CPT | Performed by: STUDENT IN AN ORGANIZED HEALTH CARE EDUCATION/TRAINING PROGRAM

## 2018-09-12 PROCEDURE — BT141ZZ FLUOROSCOPY OF KIDNEYS, URETERS AND BLADDER USING LOW OSMOLAR CONTRAST: ICD-10-PCS | Performed by: UROLOGY

## 2018-09-12 PROCEDURE — 71000014 ZZH RECOVERY PHASE 1 LEVEL 2 FIRST HR: Performed by: UROLOGY

## 2018-09-12 PROCEDURE — 00000146 ZZHCL STATISTIC GLUCOSE BY METER IP

## 2018-09-12 PROCEDURE — 27210794 ZZH OR GENERAL SUPPLY STERILE: Performed by: UROLOGY

## 2018-09-12 PROCEDURE — 37000009 ZZH ANESTHESIA TECHNICAL FEE, EACH ADDTL 15 MIN: Performed by: UROLOGY

## 2018-09-12 PROCEDURE — 25000128 H RX IP 250 OP 636: Performed by: ANESTHESIOLOGY

## 2018-09-12 PROCEDURE — 81001 URINALYSIS AUTO W/SCOPE: CPT | Performed by: NURSE PRACTITIONER

## 2018-09-12 PROCEDURE — 25000128 H RX IP 250 OP 636: Performed by: STUDENT IN AN ORGANIZED HEALTH CARE EDUCATION/TRAINING PROGRAM

## 2018-09-12 PROCEDURE — C1769 GUIDE WIRE: HCPCS | Performed by: UROLOGY

## 2018-09-12 PROCEDURE — 36000053 ZZH SURGERY LEVEL 2 EA 15 ADDTL MIN - UMMC: Performed by: UROLOGY

## 2018-09-12 PROCEDURE — C9113 INJ PANTOPRAZOLE SODIUM, VIA: HCPCS | Performed by: PHYSICIAN ASSISTANT

## 2018-09-12 PROCEDURE — 36415 COLL VENOUS BLD VENIPUNCTURE: CPT | Performed by: STUDENT IN AN ORGANIZED HEALTH CARE EDUCATION/TRAINING PROGRAM

## 2018-09-12 PROCEDURE — 25000128 H RX IP 250 OP 636: Performed by: NURSE ANESTHETIST, CERTIFIED REGISTERED

## 2018-09-12 PROCEDURE — 40000170 ZZH STATISTIC PRE-PROCEDURE ASSESSMENT II: Performed by: UROLOGY

## 2018-09-12 DEVICE — STENT URETERAL PERCUFLEX PLUS 6FRX24CM M0061752620: Type: IMPLANTABLE DEVICE | Site: URETER | Status: FUNCTIONAL

## 2018-09-12 RX ORDER — MEPERIDINE HYDROCHLORIDE 50 MG/ML
12.5 INJECTION INTRAMUSCULAR; INTRAVENOUS; SUBCUTANEOUS
Status: DISCONTINUED | OUTPATIENT
Start: 2018-09-12 | End: 2018-09-12 | Stop reason: HOSPADM

## 2018-09-12 RX ORDER — SODIUM CHLORIDE, SODIUM LACTATE, POTASSIUM CHLORIDE, CALCIUM CHLORIDE 600; 310; 30; 20 MG/100ML; MG/100ML; MG/100ML; MG/100ML
INJECTION, SOLUTION INTRAVENOUS CONTINUOUS
Status: DISCONTINUED | OUTPATIENT
Start: 2018-09-12 | End: 2018-09-12 | Stop reason: HOSPADM

## 2018-09-12 RX ORDER — FENTANYL CITRATE 50 UG/ML
25-50 INJECTION, SOLUTION INTRAMUSCULAR; INTRAVENOUS
Status: DISCONTINUED | OUTPATIENT
Start: 2018-09-12 | End: 2018-09-12 | Stop reason: HOSPADM

## 2018-09-12 RX ORDER — NALOXONE HYDROCHLORIDE 0.4 MG/ML
.1-.4 INJECTION, SOLUTION INTRAMUSCULAR; INTRAVENOUS; SUBCUTANEOUS
Status: DISCONTINUED | OUTPATIENT
Start: 2018-09-12 | End: 2018-09-12 | Stop reason: HOSPADM

## 2018-09-12 RX ORDER — ONDANSETRON 2 MG/ML
4 INJECTION INTRAMUSCULAR; INTRAVENOUS EVERY 30 MIN PRN
Status: DISCONTINUED | OUTPATIENT
Start: 2018-09-12 | End: 2018-09-12 | Stop reason: HOSPADM

## 2018-09-12 RX ORDER — ONDANSETRON 4 MG/1
4 TABLET, ORALLY DISINTEGRATING ORAL EVERY 30 MIN PRN
Status: DISCONTINUED | OUTPATIENT
Start: 2018-09-12 | End: 2018-09-12 | Stop reason: HOSPADM

## 2018-09-12 RX ORDER — SODIUM POLYSTYRENE SULFONATE 15 G/60ML
30 SUSPENSION ORAL; RECTAL ONCE
Status: COMPLETED | OUTPATIENT
Start: 2018-09-12 | End: 2018-09-12

## 2018-09-12 RX ORDER — PROPOFOL 10 MG/ML
INJECTION, EMULSION INTRAVENOUS CONTINUOUS PRN
Status: DISCONTINUED | OUTPATIENT
Start: 2018-09-12 | End: 2018-09-12

## 2018-09-12 RX ORDER — CEFAZOLIN SODIUM 1 G/3ML
1 INJECTION, POWDER, FOR SOLUTION INTRAMUSCULAR; INTRAVENOUS SEE ADMIN INSTRUCTIONS
Status: DISCONTINUED | OUTPATIENT
Start: 2018-09-12 | End: 2018-09-12 | Stop reason: HOSPADM

## 2018-09-12 RX ORDER — ONDANSETRON 2 MG/ML
4 INJECTION INTRAMUSCULAR; INTRAVENOUS ONCE
Status: COMPLETED | OUTPATIENT
Start: 2018-09-12 | End: 2018-09-12

## 2018-09-12 RX ORDER — CEFAZOLIN SODIUM 2 G/100ML
2 INJECTION, SOLUTION INTRAVENOUS
Status: COMPLETED | OUTPATIENT
Start: 2018-09-12 | End: 2018-09-12

## 2018-09-12 RX ORDER — SODIUM CHLORIDE 9 MG/ML
INJECTION, SOLUTION INTRAVENOUS CONTINUOUS PRN
Status: DISCONTINUED | OUTPATIENT
Start: 2018-09-12 | End: 2018-09-12

## 2018-09-12 RX ORDER — PROPOFOL 10 MG/ML
INJECTION, EMULSION INTRAVENOUS PRN
Status: DISCONTINUED | OUTPATIENT
Start: 2018-09-12 | End: 2018-09-12

## 2018-09-12 RX ORDER — LIDOCAINE HYDROCHLORIDE 10 MG/ML
INJECTION, SOLUTION INFILTRATION; PERINEURAL PRN
Status: DISCONTINUED | OUTPATIENT
Start: 2018-09-12 | End: 2018-09-12

## 2018-09-12 RX ORDER — LIDOCAINE 40 MG/G
CREAM TOPICAL
Status: DISCONTINUED | OUTPATIENT
Start: 2018-09-12 | End: 2018-09-12 | Stop reason: HOSPADM

## 2018-09-12 RX ADMIN — SERTRALINE HYDROCHLORIDE 100 MG: 100 TABLET ORAL at 08:20

## 2018-09-12 RX ADMIN — PROPOFOL 35 MCG/KG/MIN: 10 INJECTION, EMULSION INTRAVENOUS at 13:52

## 2018-09-12 RX ADMIN — CEFAZOLIN SODIUM 2 G: 2 INJECTION, SOLUTION INTRAVENOUS at 13:59

## 2018-09-12 RX ADMIN — SODIUM CHLORIDE: 9 INJECTION, SOLUTION INTRAVENOUS at 00:05

## 2018-09-12 RX ADMIN — FENTANYL CITRATE 25 MCG: 50 INJECTION INTRAMUSCULAR; INTRAVENOUS at 14:46

## 2018-09-12 RX ADMIN — SODIUM CHLORIDE: 9 INJECTION, SOLUTION INTRAVENOUS at 13:45

## 2018-09-12 RX ADMIN — ONDANSETRON 4 MG: 2 INJECTION INTRAMUSCULAR; INTRAVENOUS at 00:30

## 2018-09-12 RX ADMIN — ACETAMINOPHEN 650 MG: 325 TABLET, FILM COATED ORAL at 16:45

## 2018-09-12 RX ADMIN — PROPOFOL 20 MG: 10 INJECTION, EMULSION INTRAVENOUS at 14:07

## 2018-09-12 RX ADMIN — ACETAMINOPHEN 650 MG: 325 TABLET, FILM COATED ORAL at 22:35

## 2018-09-12 RX ADMIN — TRAZODONE HYDROCHLORIDE 50 MG: 50 TABLET ORAL at 00:21

## 2018-09-12 RX ADMIN — SODIUM POLYSTYRENE SULFONATE 30 G: 15 SUSPENSION ORAL; RECTAL at 09:58

## 2018-09-12 RX ADMIN — SENNOSIDES AND DOCUSATE SODIUM 2 TABLET: 8.6; 5 TABLET ORAL at 08:19

## 2018-09-12 RX ADMIN — ONDANSETRON 4 MG: 2 INJECTION INTRAMUSCULAR; INTRAVENOUS at 04:29

## 2018-09-12 RX ADMIN — LIDOCAINE HYDROCHLORIDE 100 MG: 10 INJECTION, SOLUTION INFILTRATION; PERINEURAL at 13:52

## 2018-09-12 RX ADMIN — SODIUM CHLORIDE: 9 INJECTION, SOLUTION INTRAVENOUS at 07:27

## 2018-09-12 RX ADMIN — PANTOPRAZOLE SODIUM 40 MG: 40 INJECTION, POWDER, FOR SOLUTION INTRAVENOUS at 08:20

## 2018-09-12 RX ADMIN — METHIMAZOLE 5 MG: 5 TABLET ORAL at 08:20

## 2018-09-12 RX ADMIN — PROPOFOL 40 MG: 10 INJECTION, EMULSION INTRAVENOUS at 13:55

## 2018-09-12 RX ADMIN — PROPOFOL 30 MG: 10 INJECTION, EMULSION INTRAVENOUS at 14:16

## 2018-09-12 RX ADMIN — PROPRANOLOL HYDROCHLORIDE 60 MG: 60 CAPSULE, EXTENDED RELEASE ORAL at 08:20

## 2018-09-12 RX ADMIN — PANTOPRAZOLE SODIUM 40 MG: 40 INJECTION, POWDER, FOR SOLUTION INTRAVENOUS at 22:40

## 2018-09-12 RX ADMIN — FENTANYL CITRATE 25 MCG: 50 INJECTION INTRAMUSCULAR; INTRAVENOUS at 14:54

## 2018-09-12 ASSESSMENT — ACTIVITIES OF DAILY LIVING (ADL)
ADLS_ACUITY_SCORE: 19

## 2018-09-12 ASSESSMENT — PAIN DESCRIPTION - DESCRIPTORS
DESCRIPTORS: SORE
DESCRIPTORS: SORE

## 2018-09-12 NOTE — PROGRESS NOTES
"Urology  Progress Note    Transferred to urology service for continuing anuria in setting of elevated CRESENCIO  Pt reports 425 UOP overnight, no I&O's recorded by RN  Denies flank or bladder pain    Exam  /73 (BP Location: Right arm)  Pulse 69  Temp 98.5  F (36.9  C) (Oral)  Resp 16  Ht 1.448 m (4' 9\")  Wt 66.2 kg (146 lb)  SpO2 97%  BMI 31.59 kg/m2  No acute distress  Unlabored breathing  Abdomen soft, nontender, nondistended.   Diehl with about 75cc clear yellow urine in tubing    UOP NR    Labs  AM labs pending    Assessment/Plan  85 year old y/o female POD#2 s/p bilateral URS without biopsies for persistently positive cytology. Admitted with anuria and CRESENCIO. Added on for b/l stent placement today if her creatinine continues to increase.     Neuro: tylenol for pain control  CV: HDS  Pulm: incentive spirometry while awake  FEN/GI: NPO, MIVF @ 100/hr.  Endo: KENNETH  : Diehl in place. Monitor urine output.   Heme: Hgb stable  ID: afebrile, no leukocystosis. Completed sandip-op abx.   Activity: up ad jefferson  PPx: SCDs.  Dispo: 6D    Seen and examined with the chief resident. Will discuss with Dr. Henry and Dr King.    Dayna Segundo, PGY-2  Urology Resident     Contacting the Urology Team     Please use the following job codes to reach the Urology Team. Note that you must use an in house phone and that job codes cannot receive text pages.     On weekdays, dial [phone] (or star-star-star 777 on the new [employer] telephones) then [phone] to reach the Adult Urology resident or PA on call    On weekdays, dial [phone] (or star-star-star 777 on the new [employer] telephones) then [phone] to reach the Pediatric Urology resident    On weeknights and weekends, dial [phone] (or star-star-star 777 on the new [employer] telephones) then [phone] to reach the Urology resident on call (for both Adult and Pediatrics)              "

## 2018-09-12 NOTE — PLAN OF CARE
"Problem: Patient Care Overview  Goal: Plan of Care/Patient Progress Review  Outcome: No Change  /72 (BP Location: Right arm)  Pulse 69  Temp 98.9  F (37.2  C) (Oral)  Resp 16  Ht 1.448 m (4' 9\")  Wt 66.2 kg (146 lb)  SpO2 98%  BMI 31.59 kg/m2    Reason for admission: CRESENCIO  Vitals: wnl  Activity: SBA, ambulates with walker  Pain: abdominal and flank discomfort, declines any pain medications  Neuro: wnl  Cardiac: wnl, on tele.  Respiratory: wnl  GI/: goldman catheter in place  Diet: NPO  Lines: PIV-Right forearm, saline locked  Wounds/Incisions: none  Labs/imaging/Consults: timed potassium, has been elevated  Potassium   Date Value Ref Range Status   09/12/2018 4.8 3.4 - 5.3 mmol/L Final       Discharge Plan: unknown at this time      Planned urethral stent today. Patient has showered/prepped for surgery. Plan for her to transfer to  post Pacu. Will await PACU to call for report as patient is an add on surgical case today and surgical time is not yet known.         "

## 2018-09-12 NOTE — PROGRESS NOTES
Potassium 5.6 and patient asymptomatic. Per protocol will recheck and start IVF.     Gladys Knowles MD  Urology Resident

## 2018-09-12 NOTE — PLAN OF CARE
Problem: Patient Care Overview  Goal: Plan of Care/Patient Progress Review  VSS.  A/O.  Denies pain.  Tolerating po.  BLE edema 1+.  LS clear.  Infrequent non productive cough.  Denies sob.  Diehl catheter placed by MD at 1700.  No UO.  UA ordered.  Unable to collect.  Pt denies bladder fullness or urge to void.  1800 K up to 5.6 from 5.2, md updated at 0039.  On tele.  SR, no ectopy noted.  Pt tolerating reg diet.  NPO at MN.  Oriented to room and unit routine.  Will continue POC.

## 2018-09-12 NOTE — PHARMACY-CONSULT NOTE
Patient is being treated for hyperkalemia. A pharmacy consult was initiated to review the patient's medication list for possible causes of hyperkalemia.    The following medications for this patient may cause or exacerbate hyperkalemia:     - Propranolol - in hypertensive patients, propranolol has been associated with elevated potassium  - On irbesartan prior to admission, this is currently being held inpatient  Of note, the patient is also experiencing acute kidney injury, which is likely largely contributing to hyperkalemia.    Laurel Retana, PharmD, BCPS  Pager [phone]

## 2018-09-12 NOTE — OP NOTE
OPERATIVE NOTE    DATE OF SURGERY:   09/12/18    PREOPERATIVE DIAGNOSIS:  Bilateral hydronephrosis    POSTOPERATIVE DIAGNOSIS:  Same    PROCEDURES PERFORMED:   1. Cystourethroscopy  2. Bilateral retrograde pyelogram with interpretation of intraoperative fluoroscopic imaging  3. Bilateral ureteral stent placement    STAFF SURGEON:  VICTORIA Henry MD   ASSISTANTS:   Antonio Spaulding MD; Vincenzo Castellanos MD  ANESTHESIA:  MAC    ESTIMATED BLOOD LOSS: 1 cc  DRAINS/TUBES:  6 [place] x 24 cm double-J ureteral stents bilaterally     16 Fr goldman catheter    IV FLUIDS:   Please see dictated anesthesia record  COMPLICATIONS:  None.   SPECIMEN:   None    SIGNIFICANT FINDINGS: No left sided hydronephrosis. Significant tortuosity of the right proximal ureter with mild hydronephrosis.  Proximal curl of the ureteral stent seen in the renal pelvis under fluoroscopy and distal curl seen in the bladder fluoroscopically and under direct vision.     BRIEF OPERATIVE INDICATIONS:  Dimple Oviedo is a(n) 85 year old female who underwent diagnostic ureteroscopy bilaterally on 9/10/18 and subsequently presented the following day with oliguria, CRESENCIO, and mild hydronephrosis bilaterally on CT scan. After a discussion of all risks, benefits, and alternatives, the patient elected to proceed with ureteral stent placement.     DESCRIPTION OF PROCEDURE:  After informed consent was obtained, the patient was transported to the operating room & placed supine on the table. Pneumoboots were applied.  After adequate anesthesia was induced, she was placed in lithotomy and prepped and draped in the usual sterile fashion. A timeout was taken to confirm correct patient, procedure and laterality. Pre-operative IV antibiotics were administered.     A 22-[place] rigid cystoscope was inserted into a well-lubricated urethra. The urethra was unremarkable. The left ureteral orifice was identified and cannulated with a Sensor wire and 5 [place] open-ended catheter. A  retrograde pyelogram was obtained which was unremarkable. The wire was replaced and a 6 Fr x 24-cm double-J stent was advanced over the wire. Good proximal curl was seen in the renal pelvis fluoroscopically and the distal curl was seen in the bladder fluoroscopically and under direct vision.     The same procedure was then repeated on the opposite side. Right sided retrograde pyelogram was notable for significant tortuosity of the proximal ureter with mild hydronephrosis. The stent was advanced over a super stiff wire due to the tortuosity and difficulty advancing the stent over a sensor wire. After both stents were placed, a 16 Fr goldman catheter was placed with 10cc in the balloon.  The patient tolerated the procedure well and there were no apparent complications. The patient  was transported to the postanesthesia care unit in stable condition.

## 2018-09-12 NOTE — ANESTHESIA POSTPROCEDURE EVALUATION
Patient: Dimple Oviedo    Procedure(s):  Cystoscopy Bilateral ureteral Stent Placement. - Wound Class: II-Clean Contaminated    Diagnosis:hydronephrosis  Diagnosis Additional Information: No value filed.    Anesthesia Type:  MAC    Note:  Anesthesia Post Evaluation    Patient location during evaluation: PACU  Level of consciousness: awake  Pain management: adequate  Airway patency: patent  Cardiovascular status: acceptable  Respiratory status: acceptable  Hydration status: acceptable  PONV: none     Anesthetic complications: None          Last vitals:  Vitals:    09/12/18 0813 09/12/18 1430 09/12/18 1445   BP:  137/70 143/60   Pulse:      Resp:  16 16   Temp:  36.7  C (98  F)    SpO2: 98% 100% 98%         Electronically Signed By: Madhu Wright MD  September 12, 2018  2:47 PM

## 2018-09-12 NOTE — ANESTHESIA CARE TRANSFER NOTE
Patient: Dimple Oviedo    Procedure(s):  Cystoscopy Bilateral ureteral Stent Placement. - Wound Class: II-Clean Contaminated    Diagnosis: hydronephrosis  Diagnosis Additional Information: No value filed.    Anesthesia Type:   MAC     Note:  Airway :Nasal Cannula  Patient transferred to:PACU  Comments: VSS. Ventilating well.Handoff Report: Identifed the Patient, Identified the Reponsible Provider, Reviewed the pertinent medical history, Discussed the surgical course, Reviewed Intra-OP anesthesia mangement and issues during anesthesia, Set expectations for post-procedure period and Allowed opportunity for questions and acknowledgement of understanding      Vitals: (Last set prior to Anesthesia Care Transfer)    CRNA VITALS  9/12/2018 1401 - 9/12/2018 1439      9/12/2018             NIBP: 110/67    Ht Rate: 65                Electronically Signed By: NELSON Robbins CRNA  September 12, 2018  2:39 PM

## 2018-09-12 NOTE — PLAN OF CARE
Problem: Patient Care Overview  Goal: Plan of Care/Patient Progress Review  Shift 2807-1505:    Pt alert and oriented this shift. Pt was able to get IV in for fluids around midnight. Nurse went in to start fluids right away. Pt wanted to try something to help her sleep due to her restless leg syndrome so nurse went to get pt her trazadone. Pt was in process of swallowing pill when she started to vomit. Pt vomited in blue bag and on gown before bag could be given to her. PRN IV zofran give as soon as nurse was able. Pt ambulated into restroom when bed was being changed. Pt stated she was feeling better when back in bed after while. Pt slept until reported nausea again around 0330 but was not able to get PRN zofran on MAR since it was every 6 hours and pt had not other PRN meds to utilize. Team paged and one time dose given which was helpful to patient. Pt stated she still was feeling better around 0620. Pt also had 425 ml output from [facility] at that time. Able to make needs known. Call light within reach. Will continue to monitor.

## 2018-09-12 NOTE — PROVIDER NOTIFICATION
Text paged team for antiemetic for pt. Pt is nauseous and had an episode of emesis earlier which required IV zofran. Pt not due for this yet and has nothing else on MAR.

## 2018-09-13 LAB
ANION GAP SERPL CALCULATED.3IONS-SCNC: 10 MMOL/L (ref 3–14)
BACTERIA SPEC CULT: NO GROWTH
BUN SERPL-MCNC: 34 MG/DL (ref 7–30)
CALCIUM SERPL-MCNC: 8.2 MG/DL (ref 8.5–10.1)
CHLORIDE SERPL-SCNC: 110 MMOL/L (ref 94–109)
CO2 SERPL-SCNC: 22 MMOL/L (ref 20–32)
CREAT SERPL-MCNC: 1.53 MG/DL (ref 0.52–1.04)
ERYTHROCYTE [DISTWIDTH] IN BLOOD BY AUTOMATED COUNT: 15.1 % (ref 10–15)
GFR SERPL CREATININE-BSD FRML MDRD: 32 ML/MIN/1.7M2
GLUCOSE SERPL-MCNC: 85 MG/DL (ref 70–99)
HCT VFR BLD AUTO: 29 % (ref 35–47)
HGB BLD-MCNC: 9.1 G/DL (ref 11.7–15.7)
INTERPRETATION ECG - MUSE: NORMAL
Lab: NORMAL
MCH RBC QN AUTO: 27.6 PG (ref 26.5–33)
MCHC RBC AUTO-ENTMCNC: 31.4 G/DL (ref 31.5–36.5)
MCV RBC AUTO: 88 FL (ref 78–100)
PLATELET # BLD AUTO: 191 10E9/L (ref 150–450)
POTASSIUM SERPL-SCNC: 3.8 MMOL/L (ref 3.4–5.3)
RBC # BLD AUTO: 3.3 10E12/L (ref 3.8–5.2)
SODIUM SERPL-SCNC: 142 MMOL/L (ref 133–144)
SPECIMEN SOURCE: NORMAL
WBC # BLD AUTO: 7.6 10E9/L (ref 4–11)

## 2018-09-13 PROCEDURE — C9113 INJ PANTOPRAZOLE SODIUM, VIA: HCPCS | Performed by: PHYSICIAN ASSISTANT

## 2018-09-13 PROCEDURE — A9270 NON-COVERED ITEM OR SERVICE: HCPCS | Mod: GY | Performed by: PHYSICIAN ASSISTANT

## 2018-09-13 PROCEDURE — 25000132 ZZH RX MED GY IP 250 OP 250 PS 637: Mod: GY | Performed by: PHYSICIAN ASSISTANT

## 2018-09-13 PROCEDURE — 36415 COLL VENOUS BLD VENIPUNCTURE: CPT | Performed by: UROLOGY

## 2018-09-13 PROCEDURE — A9270 NON-COVERED ITEM OR SERVICE: HCPCS | Mod: GY | Performed by: UROLOGY

## 2018-09-13 PROCEDURE — 25000128 H RX IP 250 OP 636: Performed by: STUDENT IN AN ORGANIZED HEALTH CARE EDUCATION/TRAINING PROGRAM

## 2018-09-13 PROCEDURE — 85027 COMPLETE CBC AUTOMATED: CPT | Performed by: UROLOGY

## 2018-09-13 PROCEDURE — 25000132 ZZH RX MED GY IP 250 OP 250 PS 637: Mod: GY | Performed by: UROLOGY

## 2018-09-13 PROCEDURE — 12000008 ZZH R&B INTERMEDIATE UMMC

## 2018-09-13 PROCEDURE — 80048 BASIC METABOLIC PNL TOTAL CA: CPT | Performed by: UROLOGY

## 2018-09-13 PROCEDURE — 25000128 H RX IP 250 OP 636: Performed by: PHYSICIAN ASSISTANT

## 2018-09-13 RX ORDER — AMOXICILLIN 250 MG
1-2 CAPSULE ORAL 2 TIMES DAILY
Qty: 45 TABLET | Refills: 0 | Status: SHIPPED | OUTPATIENT
Start: 2018-09-13 | End: 2018-10-31

## 2018-09-13 RX ORDER — SENNOSIDES 8.6 MG
650 CAPSULE ORAL PRN
Status: ON HOLD | COMMUNITY
End: 2018-11-14

## 2018-09-13 RX ORDER — PANTOPRAZOLE SODIUM 40 MG/1
40 TABLET, DELAYED RELEASE ORAL 2 TIMES DAILY
Status: DISCONTINUED | OUTPATIENT
Start: 2018-09-13 | End: 2018-09-14 | Stop reason: HOSPADM

## 2018-09-13 RX ORDER — FUROSEMIDE 20 MG
20 TABLET ORAL DAILY
Status: DISCONTINUED | OUTPATIENT
Start: 2018-09-13 | End: 2018-09-14 | Stop reason: HOSPADM

## 2018-09-13 RX ORDER — MONTELUKAST SODIUM 4 MG/1
1 TABLET, CHEWABLE ORAL 2 TIMES DAILY
COMMUNITY
End: 2019-01-02

## 2018-09-13 RX ORDER — POLYETHYLENE GLYCOL 3350 17 G/17G
17 POWDER, FOR SOLUTION ORAL DAILY PRN
Status: DISCONTINUED | OUTPATIENT
Start: 2018-09-13 | End: 2018-09-14 | Stop reason: HOSPADM

## 2018-09-13 RX ORDER — HYDRALAZINE HYDROCHLORIDE 20 MG/ML
10 INJECTION INTRAMUSCULAR; INTRAVENOUS EVERY 6 HOURS PRN
Status: DISCONTINUED | OUTPATIENT
Start: 2018-09-13 | End: 2018-09-14 | Stop reason: HOSPADM

## 2018-09-13 RX ORDER — FERROUS SULFATE 325(65) MG
325 TABLET, DELAYED RELEASE (ENTERIC COATED) ORAL EVERY MORNING
COMMUNITY

## 2018-09-13 RX ADMIN — SERTRALINE HYDROCHLORIDE 100 MG: 100 TABLET ORAL at 08:11

## 2018-09-13 RX ADMIN — HYDRALAZINE HYDROCHLORIDE 10 MG: 20 INJECTION INTRAMUSCULAR; INTRAVENOUS at 22:19

## 2018-09-13 RX ADMIN — PANTOPRAZOLE SODIUM 40 MG: 40 INJECTION, POWDER, FOR SOLUTION INTRAVENOUS at 08:11

## 2018-09-13 RX ADMIN — PANTOPRAZOLE SODIUM 40 MG: 40 TABLET, DELAYED RELEASE ORAL at 20:51

## 2018-09-13 RX ADMIN — METHIMAZOLE 5 MG: 5 TABLET ORAL at 08:11

## 2018-09-13 RX ADMIN — FUROSEMIDE 20 MG: 20 TABLET ORAL at 10:45

## 2018-09-13 RX ADMIN — PROPRANOLOL HYDROCHLORIDE 60 MG: 60 CAPSULE, EXTENDED RELEASE ORAL at 08:11

## 2018-09-13 ASSESSMENT — ACTIVITIES OF DAILY LIVING (ADL)
ADLS_ACUITY_SCORE: 19

## 2018-09-13 NOTE — PHARMACY-ADMISSION MEDICATION HISTORY
"Admission medication history interview status for the 9/10/2018 admission is complete. See Epic admission navigator for allergy information, pharmacy, prior to admission medications and immunization status.     Medication history interview sources:  Patient    Changes made to PTA medication list (reason)  Added: NA  Deleted:   -Valium (pt uses Zoloft for anxiety instead)  Changed: NA    Additional medication history information (including reliability of information, actions taken by pharmacist):    -Pt was alert but stated that she was not too familiar with her medications  -Pt doesn't remember her last doses.  -Prior to admission, she was told by her MD to stop aspirin and omega 3 for a week since she was going into surgery. She isn't sure if she is getting put back on it after surgery.  -Pt stated that she hasn't taken Valium in a while and is currently using Zoloft in place of it  -Pt takes 1/2 tablet trazodone at bedtime as needed for sleep as oppose to her original directions of 1 tab at bedtime. The change was made prior to med history consult.   -Pt's last dose of Fosamax was Tuesday (9/4) and stated that she did not have her dose this week which was suppose to be on Tuesday (9/11) because she was in the hospital    Prior to Admission medications    Medication Sig Last Dose Taking? Auth Provider   acetaminophen (TYLENOL) 650 MG CR tablet Take 650 mg by mouth as needed  Yes Unknown, Entered By History   alendronate (FOSAMAX) 70 MG tablet TAKE 1 TABLET EVERY 7 DAYS AT LEAST 60 MINUTES BEFORE BREAKFAST AS DIRECTED \"SEE PACKAGE FOR ADDITIONAL INSTRUCTIONS\"  Patient taking differently: TAKE 1 TABLET EVERY 7 DAYS AT LEAST 60 MINUTES BEFORE BREAKFAST AS DIRECTED \"SEE PACKAGE FOR ADDITIONAL INSTRUCTIONS\" TUESDAY  Yes Pop Zapien MD   aspirin 81 MG tablet Take 81 mg by mouth daily  Yes Unknown, Entered By History   Calcium Citrate-Vitamin D (CALCIUM + D PO) Take 1 tablet by mouth 2 times daily   Yes " Reported, Patient   colestipol (COLESTID) 1 g tablet Take 1 g by mouth 2 times daily TAKE OTHER MEDS 1 HOUR BEFORE OR 4 HOURS AFTER COLESTID  Yes Unknown, Entered By History   cycloSPORINE (RESTASIS) 0.05 % ophthalmic emulsion Place 1 drop into both eyes 2 times daily  Yes Unknown, Entered By History   ferrous sulfate 325 (65 Fe) MG TBEC EC tablet Take 325 mg by mouth daily  Yes Unknown, Entered By History   furosemide (LASIX) 20 MG tablet Take 1 tablet (20 mg) by mouth every morning  Yes Bel Mckeon MD   irbesartan (AVAPRO) 300 MG tablet TAKE 1 TABLET EVERY DAY  Patient taking differently: Take 300 mg by mouth every morning TAKE 1 TABLET EVERY DAY  Yes Pop Zapien MD   lovastatin (MEVACOR) 40 MG tablet TAKE 1 TABLET AT BEDTIME (HYPERLIPIDEMIA LDL GOAL BELOW 130)  Yes Pop Zapien MD   Melatonin 10 MG TABS tablet Take 10 mg by mouth as needed for sleep  Past Month at Unknown time Yes Reported, Patient   methimazole (TAPAZOLE) 5 MG tablet Take 1 tablet (5 mg) by mouth daily  Patient taking differently: Take 5 mg by mouth every morning   Yes Dhara Meza MD   Omega-3 Fatty Acids (FISH OIL) 500 MG CAPS Take 1 capsule by mouth 2 times daily   Yes Unknown, Entered By History   omeprazole (PRILOSEC) 20 MG CR capsule Take 1 capsule (20 mg) by mouth daily  Patient taking differently: Take 20 mg by mouth every morning   Yes Pop Zapien MD   polyethylene glycol 0.4%- propylene glycol 0.3% (SYSTANE) 0.4-0.3 % SOLN ophthalmic solution Place 1 drop into both eyes 2 times daily as needed for dry eyes   Yes Unknown, Entered By History   Probiotic Product (PROBIOTIC ADVANCED PO) Take 1 capsule by mouth every morning   Yes Reported, Patient   propranolol (INDERAL LA) 60 MG 24 hr capsule Take 1 capsule (60 mg) by mouth daily  Patient taking differently: Take 60 mg by mouth every morning   Yes Pop Zapien MD   rOPINIRole (REQUIP) 0.25 MG tablet TAKE 1 TABLET(0.25  MG) BY MOUTH EVERY NIGHT AS NEEDED  Yes Pop Zapien MD   senna-docusate (SENOKOT-S;PERICOLACE) 8.6-50 MG per tablet Take 1-2 tablets by mouth 2 times daily To prevent constipation  Yes Geovanna Fregoso PA   sertraline (ZOLOFT) 100 MG tablet Take 1 tablet (100 mg) by mouth daily  Patient taking differently: Take 100 mg by mouth every morning   Yes Pop Zapien MD   traZODone (DESYREL) 50 MG tablet TAKE 1 TABLET(50 MG) BY MOUTH EVERY NIGHT AS NEEDED FOR SLEEP  Patient taking differently: TAKE 1/2 TABLET(25 MG)  AT BEDTIME  Yes Pop Zapien MD   nitroGLYcerin (NITROSTAT) 0.4 MG sublingual tablet For chest pain place 1 tablet under the tongue every 5 minutes for 3 doses. If symptoms persist 5 minutes after 1st dose call 911. More than a month at Unknown time  Roxi Lowe APRN CNP   order for DME Equipment being ordered: Knee high compression for B LE 20-30mmHg, Velcro units for night time (consider hybrid sock as foot swelling is less than leg swelling), Donning device   Bel Mckeon MD         Medication history completed by: Carey Salas, Pharm.D. IV Student

## 2018-09-13 NOTE — PLAN OF CARE
Problem: Patient Care Overview  Goal: Plan of Care/Patient Progress Review  Outcome: No Change  Pt arrived from PACU around 1615. Post op vitals stable. On capnography, sats upper 90's on 2 L nasal cannula. A&0x4. Diehl with adequate urine output. Potassium recheck is 4.0. Pt reports slight abdominal soreness, tylenol given with relief. BM x2. Ambulated to bathroom with assist of 1 person. Regular diet, tolerating well, denies nausea/vomiting. PIV patent on right arm. Continue with POC.

## 2018-09-13 NOTE — DISCHARGE SUMMARY
[facility] Discharge Summary    Patient: Dimple Oviedo    MRN: [id number]   : 1933         Date of Admission:  9/10/2018   Date of Discharge::  2018  Admitting Physician:  Justin Henry MD  Discharge Physician:  Geovanna Fregoso Pa-C             Admission Diagnoses:   Acute kidney injury (H) [N17.9] due to ureteral obstruction  Anuria [R34]    Past Medical History:   Diagnosis Date     Calculus of kidney      Esophageal reflux      GERD (gastroesophageal reflux disease)      Hyperlipidemia LDL goal <130 2010     Malignant melanoma of skin of trunk, except scrotum (H)      Nonspecific abnormal finding     has living will  -      Nontoxic multinodular goiter     no further eval /tx rec per pt     Osteopenia      Other psoriasis      Personal history of colonic polyps      PMR (polymyalgia rheumatica) (H)      Stress-induced cardiomyopathy      Undiagnosed cardiac murmurs      Unspecified constipation      Unspecified essential hypertension              Discharge Diagnosis:   - Acute kidney injury (H) [N17.9] due to ureteral obstruction - resolved  - Anuria [R34] - resolved  - Hyperkalemia - resolved  - Bilateral hydronephrosis - resolved s/p bilateral ureteral stent placement    Past Medical History:   Diagnosis Date     Calculus of kidney      Esophageal reflux      GERD (gastroesophageal reflux disease)      Hyperlipidemia LDL goal <130 2010     Malignant melanoma of skin of trunk, except scrotum (H)      Nonspecific abnormal finding     has living will  -      Nontoxic multinodular goiter     no further eval /tx rec per pt     Osteopenia      Other psoriasis      Personal history of colonic polyps      PMR (polymyalgia rheumatica) (H)      Stress-induced cardiomyopathy      Undiagnosed cardiac murmurs      Unspecified constipation      Unspecified essential hypertension             Procedures:   Procedure(s): 18 -   1. Cystourethroscopy  2. Bilateral  "retrograde pyelogram with interpretation of intraoperative fluoroscopic imaging  3. Bilateral ureteral stent placement  Dr. Justin Henry (Urology)             Medications Prior to Admission:     No prescriptions prior to admission.             Discharge Medications:     Discharge Medication List as of 9/14/2018 12:15 PM      START taking these medications    Details   senna-docusate (SENOKOT-S;PERICOLACE) 8.6-50 MG per tablet Take 1-2 tablets by mouth 2 times daily To prevent constipation, Disp-45 tablet, R-0, E-Prescribe         CONTINUE these medications which have NOT CHANGED    Details   acetaminophen (TYLENOL) 650 MG CR tablet Take 650 mg by mouth as needed, Historical      alendronate (FOSAMAX) 70 MG tablet TAKE 1 TABLET EVERY 7 DAYS AT LEAST 60 MINUTES BEFORE BREAKFAST AS DIRECTED \"SEE PACKAGE FOR ADDITIONAL INSTRUCTIONS\", Disp-12 tablet, R-0, E-Prescribe      aspirin 81 MG tablet Take 81 mg by mouth daily, Historical      Calcium Citrate-Vitamin D (CALCIUM + D PO) Take 1 tablet by mouth 2 times daily , Historical      colestipol (COLESTID) 1 g tablet Take 1 g by mouth 2 times daily TAKE OTHER MEDS 1 HOUR BEFORE OR 4 HOURS AFTER COLESID, Historical      cycloSPORINE (RESTASIS) 0.05 % ophthalmic emulsion Place 1 drop into both eyes 2 times daily, Historical      ferrous sulfate 325 (65 Fe) MG TBEC EC tablet Take 325 mg by mouth daily, Historical      furosemide (LASIX) 20 MG tablet Take 1 tablet (20 mg) by mouth every morning, No Print Out      irbesartan (AVAPRO) 300 MG tablet TAKE 1 TABLET EVERY DAY, Disp-90 tablet, R-2, E-Prescribe      lovastatin (MEVACOR) 40 MG tablet TAKE 1 TABLET AT BEDTIME (HYPERLIPIDEMIA LDL GOAL BELOW 130), Disp-90 tablet, R-2, E-Prescribe      Melatonin 10 MG TABS tablet Take 10 mg by mouth as needed for sleep , Historical      methimazole (TAPAZOLE) 5 MG tablet Take 1 tablet (5 mg) by mouth daily, Disp-90 tablet, R-1, E-Prescribe      nitroGLYcerin (NITROSTAT) 0.4 MG " sublingual tablet For chest pain place 1 tablet under the tongue every 5 minutes for 3 doses. If symptoms persist 5 minutes after 1st dose call 911., Disp-25 tablet, R-3, E-Prescribe      Omega-3 Fatty Acids (FISH OIL) 500 MG CAPS Take 1 capsule by mouth 2 times daily , Historical      omeprazole (PRILOSEC) 20 MG CR capsule Take 1 capsule (20 mg) by mouth daily, Disp-180 capsule, R-3, E-Prescribe      order for DME Equipment being ordered: Knee high compression for B LE 20-30mmHg, Velcro units for night time (consider hybrid sock as foot swelling is less than leg swelling), Donning deviceDisp-1 each, R-2, Local Print      polyethylene glycol 0.4%- propylene glycol 0.3% (SYSTANE) 0.4-0.3 % SOLN ophthalmic solution Place 1 drop into both eyes 2 times daily as needed for dry eyes , Historical      Probiotic Product (PROBIOTIC ADVANCED PO) Take 1 capsule by mouth every morning , Historical      propranolol (INDERAL LA) 60 MG 24 hr capsule Take 1 capsule (60 mg) by mouth daily, Disp-90 capsule, R-1, E-Prescribe      rOPINIRole (REQUIP) 0.25 MG tablet TAKE 1 TABLET(0.25 MG) BY MOUTH EVERY NIGHT AS NEEDED, Disp-90 tablet, R-0, E-Prescribe**Patient requests 90 days supply**      sertraline (ZOLOFT) 100 MG tablet Take 1 tablet (100 mg) by mouth daily, Disp-90 tablet, R-1, E-Prescribe      traZODone (DESYREL) 50 MG tablet TAKE 1 TABLET(50 MG) BY MOUTH EVERY NIGHT AS NEEDED FOR SLEEP, Disp-90 tablet, R-1, E-Prescribe**Patient requests 90 days supply**         STOP taking these medications       DiazePAM (VALIUM PO) Comments:   Reason for Stopping:         Ferrous Sulfate (IRON SUPPLEMENT PO) Comments:   Reason for Stopping:                     Consultations:   Consultation during this admission received: None       Brief History of Illness:   Reason for admission requiring a surgical or invasive procedure:   hydronephrosis   The patient underwent the following procedure(s):   See above     From Urology Consult Note  "9/11/18:  Dimple Oviedo is a 85 year old female with a history of HTN, HLD, Afib, MI (12/2017), stress cardiomyopathy (12/2017), GERD, hyperthyroidism, multinodular goiter and urologic history of hematuria and positive cytology who underwent cystoscopy, random bladder biopsy and bilateral URS who comes back to the ED with minimal urine output since surgery. Patient urinated small amount of pink urine prior to 4 pm. No flank pain, no fever or chills, no suprapubic tenderness. I was called because to see in her consult because of CRESENCIO (Cr 1.85mg/dL up from baseline 0.7-1.0mg/dL), full bladder and inability to pass catheter x3 due to \"swollen urethra\". Upon interview and talking to the nurse, bladder scan actually showed 50 ml only and patient wasn't in retention, and bedside US by Dr Scherer showed that bladder is decompressed.            Hospital Course:   The patient was initially admitted to the ED Obs service where hydration was begun to for CRESENCIO.  On HD #1, Creatinine was continuing to rise (2.21mg/dL) thus a CT scan was obtained that showed mild bilateral pelvocaliectasis with contrast in the bilateral renal collecting symptoms retained from the ureterostomy procedure the preceding day. At this point the patient was transferred to the Urology service and her status was changed to Inpatient.  The patient was never felt to be in urinary retention, but a Diehl was placed to better monitor urine output. Ureteral stenting was discussed but Ms. Oviedo preferred to maintain a conservative approach.      On HD#2, creatinine johann to 3.87mg/dL and she became mildly hyperkalemic (5.7mmoL/L).  Kayexalate was used to lower potassium (4.8mmoL/L) and Ms. Oviedo was consented for a bilateral ureteral stent placement procedure which occurred the same day with Dr. Henry. There were no immediate complications during this procedure but please refer to the full operative summary for details.    On HD #3 (POD#1) Ms. Oviedo's creatinine " was trending down nicely (1.53mg/dL) while her potassium was remaining normal. She was having mild hematuria but no pain, and her vital signs were normal.  She was tolerating the discharge diet, her mobility was at baseline, and she was requiring no IV medications or fluids. She underwent a voiding trial which was successful.  She was ultimately discharged home on HD#4 with a normal creatinine (0.82mg/dL).  She was given appropriate contact information, follow-up and instructions as seen below in the discharge paperwork.         Discharge Labs:     No results found for: PSA    Recent Labs  Lab 09/14/18  0744 09/13/18  0708 09/12/18  0611 09/11/18  0612   WBC 7.5 7.6 13.1* 12.3*   HGB 9.1* 9.1* 10.0* 10.0*    191 210 202       Recent Labs  Lab 09/14/18  0744 09/13/18  0708 09/12/18  1941 09/12/18  1017 09/12/18  0611  09/11/18  0612    142  --   --  131*  --  132*   POTASSIUM 4.5 3.8 4.0 4.8 5.7*  < > 5.2   CHLORIDE 109 110*  --   --  100  --  102   CO2 27 22  --   --  22  --  19*   BUN 20 34*  --   --  52*  --  35*   CR 0.82 1.53*  --   --  3.87*  --  2.21*   * 85  --   --  110*  --  101*   SHREYA 8.6 8.2*  --   --  8.2*  --  8.2*   < > = values in this interval not displayed.    Recent Labs  Lab 09/12/18  0832   COLOR Light Yellow   APPEARANCE Clear   URINEGLC Negative   URINEBILI Negative   URINEKETONE Negative   SG 1.006   URINEPH 5.0   PROTEIN 10*   NITRITE Negative   LEUKEST Large*   RBCU 5*   WBCU 7*     Results for orders placed or performed during the hospital encounter of 09/10/18   Urine Culture Aerobic Bacterial   Result Value Ref Range    Specimen Description Catheterized Urine     Special Requests Specimen received in preservative     Culture Micro PENDING    Results for orders placed or performed in visit on 08/27/18   Urine Culture Aerobic Bacterial   Result Value Ref Range    Specimen Description Midstream Urine     Special Requests Specimen received in preservative     Culture Micro  <10,000 colonies/mL  mixed urogenital hector       Culture Micro Susceptibility testing not routinely done        Results for orders placed or performed during the hospital encounter of 09/10/18   XR Chest 2 Views    Narrative    Exam: XR CHEST 2 VW, 9/11/2018 8:20 AM    Indication: sob;     Comparison: 7/16/2018     Findings:   PA and lateral views of the chest. The trachea is midline. The heart  is prominent compared to prior study. The pulmonary vasculature is  distinct. Mild pulmonary vascular engorgement and interstitial  prominence. Fluid tracking along bilateral major fissures. No focal  airspace opacity. Small bilateral pleural effusions. No pneumothorax.  The upper abdomen is unremarkable. Degenerative changes of the spine.      Impression    Impression:   1. The cardiac silhouette is prominent compared to the prior study,  likely due to technique.  2. Pulmonary vascular engorgement and interstitial prominence, may  represent early pulmonary edema.  3. Bilateral small pleural effusions.    I have personally reviewed the examination and initial interpretation  and I agree with the findings.    JANET BRAGG, DO   CT Abdomen Pelvis w/o Contrast    Narrative    EXAMINATION: CT ABDOMEN PELVIS W/O CONTRAST, 9/11/2018 8:30 AM    TECHNIQUE:  Helical CT images from the lung bases through the  symphysis pubis were obtained  without IV contrast.    COMPARISON: Abdominal CT 1/16/2018    HISTORY: Anuria postop.    9/10/2018:  PROCEDURES PERFORMED:   1. White and blue light cystourethroscopy following intravesical  administration of hexaminolevulinate HCl (Cysview?)  2.  Bilateral retrograde pyelograms  3.  Bilateral ureteroscopy  4.  Random bladder biopsies    FINDINGS:  Lower chest:  New small bilateral pleural effusions and smooth interlobular septal  thickening suggestive of interstitial pulmonary edema. Redemonstrated  enlarged right cardiophrenic lymph node (series 3 image 36) now  measuring 18 mm in length,  increased from 1/16/2018 where it measured  14 mm.    Abdomen/pelvis:  Increased mild bilateral pelvocaliectasis, distended with contrast, as  well as air seen in the right renal collecting system. Both kidneys  with increased size and fullness of the renal parenchyma. New  bilateral perinephric fat stranding and mild edema in the  retroperitoneal space.    New mild free fluid in the pelvis consistent with simple fluid without  evidence of hemorrhage or contrast extravasation. The urinary bladder  is decompressed.    4 mm calcification again seen in the inferior pole the left kidney.  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    Cholecystectomy. Liver and spleen are within normal limits. Small left  upper quadrant splenule. Pancreas and adrenal glands are unremarkable.  No bowel obstruction. Mild stool burden. Calcifications of aorta. No  aortic aneurysm. No pathologic lymphadenopathy. Small sliding hiatal  hernia.    Unremarkable pelvic structures. No adnexal mass.    Small fat-containing umbilical hernia. Transitional anatomy at S1.  Degenerative disease of the thoracolumbar spine. Exaggerated lordotic  lumbar curvature.      Impression    IMPRESSION:   1. Findings most likely related to recent pyelogram in the kidneys,  including mild bilateral pelvocaliectasis, with contrast distention of  the collecting systems and small amount of air in the right renal  collecting system. Increased enlargement and edema of both kidneys  with new perinephric fat stranding, is nonspecific, but can be seen in  the setting of acute kidney injury or infection.  2. New simple free fluid in the pelvis. No evidence of hemorrhage or  contrast extravasation.  3. New small bilateral pleural effusions and interstitial pulmonary  edema since prior.     I have personally reviewed the examination and initial interpretation  and I agree with the findings.    HAO JACKSON MD   XR Surgery HARPAL L/T 5 Min Fluoro w Kaitlin     Narrative    This exam was marked as non-reportable because it will not be read by a   radiologist or a [place] non-radiologist provider.             Basic metabolic panel   Result Value Ref Range    Sodium 130 (L) 133 - 144 mmol/L    Potassium 5.3 3.4 - 5.3 mmol/L    Chloride 99 94 - 109 mmol/L    Carbon Dioxide 23 20 - 32 mmol/L    Anion Gap 9 3 - 14 mmol/L    Glucose 159 (H) 70 - 99 mg/dL    Urea Nitrogen 33 (H) 7 - 30 mg/dL    Creatinine 1.85 (H) 0.52 - 1.04 mg/dL    GFR Estimate 26 (L) >60 mL/min/1.7m2    GFR Estimate If Black 31 (L) >60 mL/min/1.7m2    Calcium 8.4 (L) 8.5 - 10.1 mg/dL   CBC with platelets differential   Result Value Ref Range    WBC 10.2 4.0 - 11.0 10e9/L    RBC Count 3.70 (L) 3.8 - 5.2 10e12/L    Hemoglobin 10.3 (L) 11.7 - 15.7 g/dL    Hematocrit 32.6 (L) 35.0 - 47.0 %    MCV 88 78 - 100 fl    MCH 27.8 26.5 - 33.0 pg    MCHC 31.6 31.5 - 36.5 g/dL    RDW 14.3 10.0 - 15.0 %    Platelet Count 214 150 - 450 10e9/L    Diff Method Automated Method     % Neutrophils 81.4 %    % Lymphocytes 7.6 %    % Monocytes 10.8 %    % Eosinophils 0.0 %    % Basophils 0.0 %    % Immature Granulocytes 0.2 %    Nucleated RBCs 0 0 /100    Absolute Neutrophil 8.3 1.6 - 8.3 10e9/L    Absolute Lymphocytes 0.8 0.8 - 5.3 10e9/L    Absolute Monocytes 1.1 0.0 - 1.3 10e9/L    Absolute Eosinophils 0.0 0.0 - 0.7 10e9/L    Absolute Basophils 0.0 0.0 - 0.2 10e9/L    Abs Immature Granulocytes 0.0 0 - 0.4 10e9/L    Absolute Nucleated RBC 0.0    Comprehensive metabolic panel   Result Value Ref Range    Sodium 132 (L) 133 - 144 mmol/L    Potassium 5.2 3.4 - 5.3 mmol/L    Chloride 102 94 - 109 mmol/L    Carbon Dioxide 19 (L) 20 - 32 mmol/L    Anion Gap 11 3 - 14 mmol/L    Glucose 101 (H) 70 - 99 mg/dL    Urea Nitrogen 35 (H) 7 - 30 mg/dL    Creatinine 2.21 (H) 0.52 - 1.04 mg/dL    GFR Estimate 21 (L) >60 mL/min/1.7m2    GFR Estimate If Black 26 (L) >60 mL/min/1.7m2    Calcium 8.2 (L) 8.5 - 10.1 mg/dL    Bilirubin Total 0.7 0.2 -  1.3 mg/dL    Albumin 3.1 (L) 3.4 - 5.0 g/dL    Protein Total 6.5 (L) 6.8 - 8.8 g/dL    Alkaline Phosphatase 129 40 - 150 U/L    ALT 92 (H) 0 - 50 U/L     (H) 0 - 45 U/L   CBC with platelets differential   Result Value Ref Range    WBC 12.3 (H) 4.0 - 11.0 10e9/L    RBC Count 3.65 (L) 3.8 - 5.2 10e12/L    Hemoglobin 10.0 (L) 11.7 - 15.7 g/dL    Hematocrit 31.7 (L) 35.0 - 47.0 %    MCV 87 78 - 100 fl    MCH 27.4 26.5 - 33.0 pg    MCHC 31.5 31.5 - 36.5 g/dL    RDW 14.5 10.0 - 15.0 %    Platelet Count 202 150 - 450 10e9/L    Diff Method Automated Method     % Neutrophils 80.0 %    % Lymphocytes 8.6 %    % Monocytes 11.1 %    % Eosinophils 0.0 %    % Basophils 0.1 %    % Immature Granulocytes 0.2 %    Nucleated RBCs 0 0 /100    Absolute Neutrophil 9.9 (H) 1.6 - 8.3 10e9/L    Absolute Lymphocytes 1.1 0.8 - 5.3 10e9/L    Absolute Monocytes 1.4 (H) 0.0 - 1.3 10e9/L    Absolute Eosinophils 0.0 0.0 - 0.7 10e9/L    Absolute Basophils 0.0 0.0 - 0.2 10e9/L    Abs Immature Granulocytes 0.0 0 - 0.4 10e9/L    Absolute Nucleated RBC 0.0    UA reflex to Microscopic and Culture   Result Value Ref Range    Color Urine Light Yellow     Appearance Urine Clear     Glucose Urine Negative NEG^Negative mg/dL    Bilirubin Urine Negative NEG^Negative    Ketones Urine Negative NEG^Negative mg/dL    Specific Gravity Urine 1.006 1.003 - 1.035    Blood Urine Large (A) NEG^Negative    pH Urine 5.0 5.0 - 7.0 pH    Protein Albumin Urine 10 (A) NEG^Negative mg/dL    Urobilinogen mg/dL Normal 0.0 - 2.0 mg/dL    Nitrite Urine Negative NEG^Negative    Leukocyte Esterase Urine Large (A) NEG^Negative    Source Catheterized Urine     RBC Urine 5 (H) 0 - 2 /HPF    WBC Urine 7 (H) 0 - 5 /HPF    Bacteria Urine Few (A) NEG^Negative /HPF   Nt probnp inpatient   Result Value Ref Range    N-Terminal Pro BNP Inpatient 28013 (H) 0 - 1800 pg/mL   Potassium   Result Value Ref Range    Potassium 5.6 (H) 3.4 - 5.3 mmol/L   Potassium   Result Value Ref Range     Potassium 5.2 3.4 - 5.3 mmol/L   Potassium   Result Value Ref Range    Potassium 5.5 (H) 3.4 - 5.3 mmol/L   CBC with platelets   Result Value Ref Range    WBC 13.1 (H) 4.0 - 11.0 10e9/L    RBC Count 3.63 (L) 3.8 - 5.2 10e12/L    Hemoglobin 10.0 (L) 11.7 - 15.7 g/dL    Hematocrit 31.8 (L) 35.0 - 47.0 %    MCV 88 78 - 100 fl    MCH 27.5 26.5 - 33.0 pg    MCHC 31.4 (L) 31.5 - 36.5 g/dL    RDW 14.8 10.0 - 15.0 %    Platelet Count 210 150 - 450 10e9/L   Basic metabolic panel   Result Value Ref Range    Sodium 131 (L) 133 - 144 mmol/L    Potassium 5.7 (H) 3.4 - 5.3 mmol/L    Chloride 100 94 - 109 mmol/L    Carbon Dioxide 22 20 - 32 mmol/L    Anion Gap 10 3 - 14 mmol/L    Glucose 110 (H) 70 - 99 mg/dL    Urea Nitrogen 52 (H) 7 - 30 mg/dL    Creatinine 3.87 (H) 0.52 - 1.04 mg/dL    GFR Estimate 11 (L) >60 mL/min/1.7m2    GFR Estimate If Black 13 (L) >60 mL/min/1.7m2    Calcium 8.2 (L) 8.5 - 10.1 mg/dL   Potassium   Result Value Ref Range    Potassium 4.8 3.4 - 5.3 mmol/L   Glucose by meter   Result Value Ref Range    Glucose 99 70 - 99 mg/dL   Potassium   Result Value Ref Range    Potassium 4.0 3.4 - 5.3 mmol/L   CBC with platelets   Result Value Ref Range    WBC 7.6 4.0 - 11.0 10e9/L    RBC Count 3.30 (L) 3.8 - 5.2 10e12/L    Hemoglobin 9.1 (L) 11.7 - 15.7 g/dL    Hematocrit 29.0 (L) 35.0 - 47.0 %    MCV 88 78 - 100 fl    MCH 27.6 26.5 - 33.0 pg    MCHC 31.4 (L) 31.5 - 36.5 g/dL    RDW 15.1 (H) 10.0 - 15.0 %    Platelet Count 191 150 - 450 10e9/L   Basic metabolic panel   Result Value Ref Range    Sodium 142 133 - 144 mmol/L    Potassium 3.8 3.4 - 5.3 mmol/L    Chloride 110 (H) 94 - 109 mmol/L    Carbon Dioxide 22 20 - 32 mmol/L    Anion Gap 10 3 - 14 mmol/L    Glucose 85 70 - 99 mg/dL    Urea Nitrogen 34 (H) 7 - 30 mg/dL    Creatinine 1.53 (H) 0.52 - 1.04 mg/dL    GFR Estimate 32 (L) >60 mL/min/1.7m2    GFR Estimate If Black 39 (L) >60 mL/min/1.7m2    Calcium 8.2 (L) 8.5 - 10.1 mg/dL   EKG 12-lead, tracing only   Result  "Value Ref Range    Interpretation ECG Click View Image link to view waveform and result    Urology IP Consult: post urologic procedure with CRESENCIO and decreased UOP. unable to place goldman; Consultant may enter orders: Yes; Patient to be seen: Routine - within 24 hours    Narrative    Haris Garza MD     9/11/2018  8:13 AM  Urology Consult    Name: Dimple Oviedo    MRN: [id number]   YOB: 1933       We were asked to see Dimple Oviedo at the request of Dr. Scherer   for evaluation and treatment of the following chief complaint.        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 Patient urinated small amount   of pink urine prior to 4 pm. No flank pain, no fever or chills,   no suprapubic tenderness. I was called because to see in her   consult because of CRESENCIO, full bladder and inability to pass   catheter x3 due to \"swollen urethra\". Upon interview and talking   to the nurse, bladder scan actually showed 50 ml only and patient   wasn't in retention, and bedside US by Dr Scherer showed that   bladder is decompressed.           Past Medical History:     Past Medical History:   Diagnosis Date     Calculus of kidney      Esophageal reflux      GERD (gastroesophageal reflux disease)      Hyperlipidemia LDL goal <130 5/9/2010     Malignant melanoma of skin of trunk, except scrotum (H)      Nonspecific abnormal finding     has living will 2004 -      Nontoxic multinodular goiter     no further eval /tx rec per pt     Osteopenia      Other psoriasis      Personal history of colonic " polyps      PMR (polymyalgia rheumatica) (H)      Stress-induced cardiomyopathy      Undiagnosed cardiac murmurs      Unspecified constipation      Unspecified essential hypertension             Past Surgical History:     Past Surgical History:   Procedure Laterality Date     BIOPSY       C NONSPECIFIC PROCEDURE  2005    colonoscopy polyp repeat 2010     COLONOSCOPY  2014     COMBINED CYSTOSCOPY, RETROGRADES, URETEROSCOPY, INSERT STENT   Bilateral 4/3/2018    Procedure: COMBINED CYSTOSCOPY, RETROGRADES, URETEROSCOPY,   INSERT STENT;;  Surgeon: Stuart King MD;  Location:   [facility] OR     CYSTOSCOPY, BIOPSY BLADDER INSTILL OPTICAL AGENT N/A 4/3/2018    Procedure: CYSTOSCOPY, BIOPSY BLADDER INSTILL OPTICAL AGENT;    Cystoscopy, Blue Light Cystoscopy, Bladder Biopsies, Bilateral   Selective ureteral washings for Cytology, Bilateral Retrograde   Pyelograms, Bilateral Ureteroscopy;  Surgeon: Stuart King MD;  Location: [facility] OR     CYSTOSCOPY, BIOPSY BLADDER, COMBINED N/A 2/19/2018    Procedure: COMBINED CYSTOSCOPY, BIOPSY BLADDER;  Cystoscopy,   Bladder Biopsy;  Surgeon: Kenna La MD;  Location:   [facility] OR     ENDOSCOPIC ULTRASOUND LOWER GASTROINTESTIONAL TRACT (GI) N/A   10/30/2015    Procedure: ENDOSCOPIC ULTRASOUND LOWER GASTROINTESTIONAL TRACT   (GI);  Surgeon: Daniel Jean-Baptiste MD;  Location: [facility] OR     EYE SURGERY  12/4/17     LAPAROSCOPIC CHOLECYSTECTOMY WITH CHOLANGIOGRAMS N/A 11/1/2015    Procedure: LAPAROSCOPIC CHOLECYSTECTOMY WITH CHOLANGIOGRAMS;    Surgeon: Tonie Warren MD;  Location: [facility] OR     SURGICAL HISTORY OF -   1996    malignant melanoma     SURGICAL HISTORY OF -   1968    thyroid nodule     SURGICAL HISTORY OF -       D & C            Social History:     Social History   Substance Use Topics     Smoking status: Never Smoker     Smokeless tobacco: Never Used     Alcohol use No      Comment: 6 times per year-one drink            Family History:     Family  History   Problem Relation Age of Onset     Cancer Father      dec - esophageal and laryngeal     HEART DISEASE Mother      Respiratory Mother      dec     Breast Cancer Daughter      Other Cancer Daughter      Thyroid Disease Daughter      Asthma Daughter      Hyperlipidemia Son      Diabetes Son             Allergies:     Allergies   Allergen Reactions     No Known Drug Allergies             Medications:     Current Facility-Administered Medications   Medication     acetaminophen (TYLENOL) tablet 650 mg     furosemide (LASIX) injection 20 mg     irbesartan (AVAPRO) tablet 300 mg     melatonin tablet 10 mg     methimazole (TAPAZOLE) tablet 5 mg     naloxone (NARCAN) injection 0.1-0.4 mg     ondansetron (ZOFRAN-ODT) ODT tab 4 mg    Or     ondansetron (ZOFRAN) injection 4 mg     pantoprazole (PROTONIX) 40 mg IV push injection     propranolol (INDERAL LA) 24 hr capsule 60 mg     rOPINIRole (REQUIP) tablet 0.25 mg     senna-docusate (SENOKOT-S;PERICOLACE) 8.6-50 MG per tablet 1-2   tablet     sertraline (ZOLOFT) tablet 100 mg     sodium chloride 0.9% infusion     traZODone (DESYREL) tablet 50 mg             Review of Systems:    ROS: 10 point ROS neg other than the symptoms noted above in the   HPI           Physical Exam:   VS:  T: 98.2    HR: 69    BP: 152/51    RR: 18   GEN:  AOx3.  NAD.    CV:  RRR  LUNGS: Non-labored breathing.   BACK:  No midline or CVA tenderness.  ABD:  Soft.  NT.  ND.  No rebound or guarding.  No masses.  EXT:  Warm, well perfused.    SKIN:  Warm.  Dry.  No rashes.  NEURO:  CN grossly intact.            Data:   All laboratory data reviewed:      Recent Labs  Lab 09/11/18  0612 09/11/18  0045   WBC 12.3* 10.2   HGB 10.0* 10.3*    214       Recent Labs  Lab 09/11/18  0612 09/11/18  0045 09/10/18  0810   * 130*  --    POTASSIUM 5.2 5.3 4.4   CHLORIDE 102 99  --    CO2 19* 23  --    BUN 35* 33*  --    CR 2.21* 1.85*  --    * 159*  --    SHREYA 8.2* 8.4*  --      No lab results  found in last 7 days.    Invalid input(s): URINEBLOOD    All pertinent imaging reviewed:    Results for orders placed or performed in visit on 09/10/18   XR Surgery HARPAL L/T 5 Min Fluoro w Stills    Narrative    This exam was marked as non-reportable because it will not be   read by a   radiologist or a [place] non-radiologist provider.                      Impression and Plan:   Impression:   Dimple Oviedo is a 85 year old female with low urine output   post cystoscopy and bilateral URS  Could be pre-renal vs post-renal due to instrumentation  Doesn't need catheter at this point as her bladder is empty      Plan:     Recommend IV bolus and hydration. If patient remains with low   urine ouput please obtain imaging to assess for hydronephrosis                      This patient's exam findings, labs, and imaging discussed with   urology staff surgeon Dr. King, who developed the treatment   plan.    Haris Garza MD  Urology Resident PGY4          Echocardiogram Complete    Narrative    [id number]  [id number]  [id number]  [id number]^LEONEL^VLADIMIR^BRANDT           [facility],[place]  Echocardiography Laboratory  [address] [place] [id number]     Name: DIMPLE OVIEDO  MRN: [id number]  : 1933  Study Date: 2018 08:45 AM  Age: 85 yrs  Gender: Female  Patient Location: [facility], [place]  Reason For Study: SOB  Ordering Physician: VLADIMIR CASTANEDA  Performed By: ANIBAL Phelps     BSA: 1.6 m2  Height: 57 in  Weight: 146 lb  BP: 152/51 mmHg  _____________________________________________________________________________  __        Procedure  Complete Portable Echo Adult.  _____________________________________________________________________________  __        Interpretation Summary  Left ventricular function, chamber size, wall motion, and wall thickness are  normal.The EF is 55-60%.  Right ventricular function, chamber size, wall motion, and thickness are  normal.  Severe left atrial  enlargement is present.  Aortic valve poorly visualized but appears to have moderate calcification and  at least mild-moderate stenosis.  Mild to moderate aortic insufficiency is present.  IVC measures 2.47cm and collapses with inspiration. Estimated mean right  atrial pressure is 8 mmHg (mildly elevated).  No pericardial effusion is present.     _____________________________________________________________________________  __        Left Ventricle  Left ventricular function, chamber size, wall motion, and wall thickness are  normal.The EF is 55-60%. Left ventricular size is normal. Relative wall  thickness is increased consistent with concentric remodeling. Grade II or  moderate diastolic dysfunction. No regional wall motion abnormalities are  seen.     Right Ventricle  Right ventricular function, chamber size, wall motion, and thickness are  normal.     Atria  Severe left atrial enlargement is present. Mild right atrial enlargement is  present.        Mitral Valve  The mitral valve is normal. Trace mitral insufficiency is present.     Aortic Valve  Moderate to severe aortic valve sclerosis is present. Mild to moderate aortic  insufficiency is present. Mild to moderate aortic stenosis is present. The  peak aortic velocity is 2.8 m/sec. The V2/V1 ratio is .43.     Tricuspid Valve  The tricuspid valve is normal. Moderate tricuspid insufficiency is present.  Right ventricular systolic pressure is 43mmHg above the right atrial pressure.  Suggests mild-moderate pulmonary hypertension.     Pulmonic Valve  The pulmonic valve is normal.     Vessels  IVC measures 2.47cm and collapses with inspiration.Aortic root and proximal  ascending aorta are normal in size. Dilation of the inferior vena cava is  present with normal respiratory variation in diameter. Estimated mean right  atrial pressure is 8 mmHg (mildly elevated).     Pericardium  No pericardial effusion is present.        Compared to Previous Study  compared to the  previous study done on 18 there has been no significant  change.     Attestation  I have personally viewed the imaging and agree with the interpretation and  report as documented by the fellow, Tana Martinez, and/or edited by me.  _____________________________________________________________________________  __     MMode/2D Measurements & Calculations  IVSd: 0.85 cm  LVIDd: 4.3 cm  LVIDs: 3.0 cm  LVPWd: 1.1 cm  FS: 30.2 %  LV mass(C)d: 139.4 grams  LV mass(C)dI: 88.6 grams/m2  LVOT diam: 2.0 cm  LVOT area: 3.1 cm2  LA Volume (BP): 86.1 ml     LA Volume Index (BP): 54.8 ml/m2  RWT: 0.52        Doppler Measurements & Calculations  MV E max rick: 136.0 cm/sec  MV A max rick: 105.0 cm/sec  MV E/A: 1.3  MV dec time: 0.19 sec  Ao V2 max: 279.0 cm/sec  Ao max P.1 mmHg  Ao V2 mean: 206.0 cm/sec  Ao mean P.0 mmHg  Ao V2 VTI: 66.1 cm  PAUL(I,D): 1.1 cm2  PAUL(V,D): 1.2 cm2  AI P1/2t: 340.6 msec  LV V1 max P.8 mmHg  LV V1 max: 109.0 cm/sec  LV V1 VTI: 23.3 cm  SV(LVOT): 73.2 ml  SI(LVOT): 46.5 ml/m2  TR max rick: 327.0 cm/sec  TR max P.8 mmHg  AV Rick Ratio (DI): 0.39  PAUL Index (cm2/m2): 0.70  E/E' av.0  Lateral E/e': 19.3  Medial E/e': 26.6        _____________________________________________________________________________  __           Report approved by: Ade Tierney 2018 12:01 PM      Urine Culture Aerobic Bacterial   Result Value Ref Range    Specimen Description Catheterized Urine     Special Requests Specimen received in preservative     Culture Micro PENDING               Discharge Instructions and Follow-Up:   Diet:  - Regular/ home diet  - Drink plenty of fluids to keep urine yellow or light pink    Activity:   - May resume regular activity as tolerated, but be aware that increased activity can sometimes cause worsening of hematuria (blood in the urine)   - Do not strain with bowel movements.    - Do not drive until you can press the brake pedal quickly and fully  without pain.    - Do not operate a motor vehicle while taking narcotic pain medications.   - Ok to resume daily showering starting 48 hours after surgery.    Medications:   1) PAIN: Continue Tylenol (acetaminophen 625mg) every 6 hours as needed for discomfort.  Do not take more than 4,000mg of Tylenol (acetaminophen/ APAP) from all sources in any 24 hour period since this can cause liver damage.  Avoid ibuprofen and other antiinflammatories (ie. Aleve, naproxen, motrin, advil, celebrex, etc.) until your kidney function normalizes.      2) CONSTIPATION: Pericolace (senna/docusate sodium) can be taken twice daily for prevention of constipation.  Surgery can make you constipated and constipation can worsen hematuria (blood in the urine).  Please reduce or stop pericolace if you develop loose stools. Other over the counter solutions such as prune juice, miralax, fiber products, senna, and dulcolax can also be used. If you are taking the pericolace but still have not had a bowel movement in 3 days, start over-the-counter Milk of Magnesia taken twice daily until you have a nice bowel movement.  Call the office with any concerns.     Tubes / drains:  - You have bilateral ureteral stents in place. You can expect some flank discomfort on both sides, possibly worse with urination, and you may experience the urge to void more frequently. You may experience burning in your urethra with urination as well.   - Stents can sometimes cause bladder spasms (lower abdominal pain that feels like the need to urinate). Call the urology clinic if this occurs - medications can be prescribed to help with these symptoms.   - Drink 2-3L of water a day to keep your urine clear to light pink in color. Some blood in your urine can be expected but should clear with increased water consumption as instructed.   - Call for thickening red urine, large blood clots, or inability to void.     Follow-Up:   - See your primary care provider within 5-7 days  of discharge for a post-hospitalization checkup and particularly to recheck your kidney function.   - Follow up with Dr. King as scheduled on 9/20/18 to discuss pathology from your recent ureteroscopy procedure.  You should also discuss with Dr. King when your stents will be changed or removed.   - Call or return sooner than your regularly scheduled visit if you develop any of the following:  Fever (greater than 101.3F) or flu-like symptoms, uncontrolled pain, uncontrolled nausea or vomiting, as well as thickening red urine, large clots or difficulty urinating.    Phone numbers:  - Nursing phone helpline at the Urology Clinic (8A-5P M-F):  [phone].    - Nights or weekends, call [phone] and ask the  to page the urology resident on call.   - For emergencies, always call [phone]         Discharge Disposition:   Discharged to home      Attestation: I have reviewed today's vital signs, notes, medications, labs and imaging.    Quyen Fregoso PA-C  Urology Physician Assistant  Personal Pager: [phone]    Please call Job Code:   [phone] to reach the Urology resident or PA on call - Weekdays  [phone] to reach the Urology resident or PA on call - Weeknights and weekends    September 14, 2018

## 2018-09-13 NOTE — PLAN OF CARE
"Problem: Patient Care Overview  Goal: Plan of Care/Patient Progress Review  /51 (BP Location: Left arm)  Pulse 71  Temp 98.3  F (36.8  C) (Oral)  Resp 18  Ht 1.448 m (4' 9\")  Wt 66.2 kg (146 lb)  SpO2 98%  BMI 31.59 kg/m2    Pt afebrile. Urinary catheter removed around 1400. Voided complete water flush per MD (instill and allow to void). Waiting for second void before 2000. PIV running TKO. Up with SBA and a walker. +3 edema on lower extremities. Regular diet tolerating without complaints of GI distress. Positive bowel sounds and passing minimal flatus but no BM for shift. On 2L O2 and sating in upper 90s. Will continue to monitor.       "

## 2018-09-13 NOTE — PROGRESS NOTES
"Urology  Progress Note    No acute events overnight  Patient stented yesterday  Pain well controlled      Exam  /58 (BP Location: Left arm)  Pulse 69  Temp 96.6  F (35.9  C) (Oral)  Resp 18  Ht 1.448 m (4' 9\")  Wt 66.2 kg (146 lb)  SpO2 98%  BMI 31.59 kg/m2  No acute distress  Unlabored breathing  Abdomen soft, nontender, nondistended.   Diehl with about 75cc clear yellow urine in tubing    UOP 1800/775    Labs  K 4.0 (4.8)  AM labs pending    Assessment/Plan  85 year old y/o female POD#3 s/p bilateral URS without biopsies for persistently positive cytology. Admitted with anuria and CRESENCIO. POD #1 s/p bilateral stent placement.     Neuro: tylenol PRN for pain control  CV: HDS  Pulm: incentive spirometry while awake  FEN/GI: Regular diet, MIVF @ 100/hr. On scheduled bowel regimen.   Endo: KENNETH  : Diehl in place. Monitor urine output.   Heme: Hgb stable  ID: afebrile, no leukocystosis. Completed sandip-op abx.   Activity: up ad jefferson  PPx: SCDs.  Dispo: 7B    Seen and examined with the chief resident. Will discuss with Dr. Henry and Dr King.    Vincenzo Castellanos, PGY-3  Urology Resident     Contacting the Urology Team     Please use the following job codes to reach the Urology Team. Note that you must use an in house phone and that job codes cannot receive text pages.     On weekdays, dial [phone] (or star-star-star 777 on the new [employer] telephones) then 0817 to reach the Adult Urology resident or PA on call    On weekdays, dial [phone] (or star-star-star 777 on the new [employer] telephones) then 0818 to reach the Pediatric Urology resident    On weeknights and weekends, dial [phone] (or star-star-star 777 on the new [employer] telephones) then [phone] to reach the Urology resident on call (for both Adult and Pediatrics)              "

## 2018-09-13 NOTE — PLAN OF CARE
Problem: Urine Elimination Impaired (Adult)  Goal: Effective Urinary Elimination  Patient will demonstrate the desired outcomes by discharge/transition of care.   Outcome: Improving  Vitals:    09/12/18 2000 09/12/18 2100 09/12/18 2234 09/13/18 0351   BP: 132/49  142/51 150/58   BP Location:   Left arm Left arm   Pulse:       Resp: 18 16 20 18   Temp:   97.1  F (36.2  C) 96.6  F (35.9  C)   TempSrc:   Oral Oral   SpO2: 98% 98% 97% 98%   Weight:       Height:       Patient denies pain.  Diehl with  775cc of rust colored urine.  Lungs clear, O2 sats in upper 90's on 2 liters.  Has 3+ edema on BLE, legs elevated while in bed.  Abd with positive bowel sounds, denies flatus this shift, denies nausea.  Last K+ 4.0.  Continue with POC.

## 2018-09-14 VITALS
OXYGEN SATURATION: 90 % | HEART RATE: 72 BPM | DIASTOLIC BLOOD PRESSURE: 55 MMHG | RESPIRATION RATE: 18 BRPM | TEMPERATURE: 97.1 F | SYSTOLIC BLOOD PRESSURE: 163 MMHG | HEIGHT: 57 IN | WEIGHT: 146 LBS | BODY MASS INDEX: 31.5 KG/M2

## 2018-09-14 LAB
ANION GAP SERPL CALCULATED.3IONS-SCNC: 5 MMOL/L (ref 3–14)
BUN SERPL-MCNC: 20 MG/DL (ref 7–30)
CALCIUM SERPL-MCNC: 8.6 MG/DL (ref 8.5–10.1)
CHLORIDE SERPL-SCNC: 109 MMOL/L (ref 94–109)
CO2 SERPL-SCNC: 27 MMOL/L (ref 20–32)
CREAT SERPL-MCNC: 0.82 MG/DL (ref 0.52–1.04)
ERYTHROCYTE [DISTWIDTH] IN BLOOD BY AUTOMATED COUNT: 15 % (ref 10–15)
GFR SERPL CREATININE-BSD FRML MDRD: 66 ML/MIN/1.7M2
GLUCOSE BLDC GLUCOMTR-MCNC: 109 MG/DL (ref 70–99)
GLUCOSE SERPL-MCNC: 103 MG/DL (ref 70–99)
HCT VFR BLD AUTO: 29.3 % (ref 35–47)
HGB BLD-MCNC: 9.1 G/DL (ref 11.7–15.7)
MCH RBC QN AUTO: 27.5 PG (ref 26.5–33)
MCHC RBC AUTO-ENTMCNC: 31.1 G/DL (ref 31.5–36.5)
MCV RBC AUTO: 89 FL (ref 78–100)
PLATELET # BLD AUTO: 207 10E9/L (ref 150–450)
POTASSIUM SERPL-SCNC: 4.5 MMOL/L (ref 3.4–5.3)
RBC # BLD AUTO: 3.31 10E12/L (ref 3.8–5.2)
RESULT: NORMAL
SEND OUTS MISC TEST CODE: NORMAL
SEND OUTS MISC TEST SPECIMEN: NORMAL
SODIUM SERPL-SCNC: 141 MMOL/L (ref 133–144)
TEST NAME: NORMAL
WBC # BLD AUTO: 7.5 10E9/L (ref 4–11)

## 2018-09-14 PROCEDURE — 25000128 H RX IP 250 OP 636: Performed by: PHYSICIAN ASSISTANT

## 2018-09-14 PROCEDURE — 85027 COMPLETE CBC AUTOMATED: CPT | Performed by: UROLOGY

## 2018-09-14 PROCEDURE — 80048 BASIC METABOLIC PNL TOTAL CA: CPT | Performed by: UROLOGY

## 2018-09-14 PROCEDURE — A9270 NON-COVERED ITEM OR SERVICE: HCPCS | Mod: GY | Performed by: PHYSICIAN ASSISTANT

## 2018-09-14 PROCEDURE — 25000132 ZZH RX MED GY IP 250 OP 250 PS 637: Mod: GY | Performed by: PHYSICIAN ASSISTANT

## 2018-09-14 PROCEDURE — A9270 NON-COVERED ITEM OR SERVICE: HCPCS | Mod: GY | Performed by: UROLOGY

## 2018-09-14 PROCEDURE — 25000132 ZZH RX MED GY IP 250 OP 250 PS 637: Mod: GY | Performed by: UROLOGY

## 2018-09-14 PROCEDURE — 36415 COLL VENOUS BLD VENIPUNCTURE: CPT | Performed by: UROLOGY

## 2018-09-14 PROCEDURE — 00000146 ZZHCL STATISTIC GLUCOSE BY METER IP

## 2018-09-14 RX ADMIN — PANTOPRAZOLE SODIUM 40 MG: 40 TABLET, DELAYED RELEASE ORAL at 08:48

## 2018-09-14 RX ADMIN — SENNOSIDES AND DOCUSATE SODIUM 1 TABLET: 8.6; 5 TABLET ORAL at 08:48

## 2018-09-14 RX ADMIN — PROPRANOLOL HYDROCHLORIDE 60 MG: 60 CAPSULE, EXTENDED RELEASE ORAL at 08:48

## 2018-09-14 RX ADMIN — ONDANSETRON 4 MG: 2 INJECTION INTRAMUSCULAR; INTRAVENOUS at 00:13

## 2018-09-14 RX ADMIN — METHIMAZOLE 5 MG: 5 TABLET ORAL at 08:47

## 2018-09-14 RX ADMIN — ROPINIROLE HYDROCHLORIDE 0.25 MG: 0.25 TABLET, FILM COATED ORAL at 00:13

## 2018-09-14 RX ADMIN — SERTRALINE HYDROCHLORIDE 100 MG: 100 TABLET ORAL at 08:48

## 2018-09-14 RX ADMIN — FUROSEMIDE 20 MG: 20 TABLET ORAL at 08:48

## 2018-09-14 ASSESSMENT — ACTIVITIES OF DAILY LIVING (ADL)
ADLS_ACUITY_SCORE: 19

## 2018-09-14 NOTE — PROGRESS NOTES
"Urology  Progress Note    No acute events overnight  Required 2L NC last night, discharge postponed  Voiding independently    Exam  /58  Pulse 74  Temp 98.8  F (37.1  C) (Oral)  Resp 18  Ht 1.448 m (4' 9\")  Wt 66.2 kg (146 lb)  SpO2 96%  BMI 31.59 kg/m2  No acute distress  Unlabored breathing. On 0L NC.   Abdomen soft, nontender, nondistended.   Diehl with about 75cc clear yellow urine in tubing    UOP 3425/350    Labs  AM labs pending    Assessment/Plan  85 year old y/o female POD#4 s/p bilateral URS without biopsies for persistently positive cytology. Admitted with anuria and CRESENCIO. POD #2 s/p bilateral stent placement.     Neuro: tylenol PRN for pain control  CV: HDS  Pulm: incentive spirometry while awake  FEN/GI: Regular diet, MIVF @ 100/hr. On scheduled bowel regimen.   Endo: KENNETH  : Monitor urine output.   Heme: Hgb stable  ID: afebrile, no leukocystosis. Completed sandip-op abx.   Activity: up ad jefferson  PPx: SCDs.  Dispo: Home today    Seen and examined with the chief resident. Will discuss with Dr. Henry and Dr King.    Dayna Segundo MD  Urology PGY-2       Contacting the Urology Team     Please use the following job codes to reach the Urology Team. Note that you must use an in house phone and that job codes cannot receive text pages.     On weekdays, dial [phone] (or star-star-star 777 on the new [employer] telephones) then 0817 to reach the Adult Urology resident or PA on call    On weekdays, dial [phone] (or star-star-star 777 on the new [employer] telephones) then 0818 to reach the Pediatric Urology resident    On weeknights and weekends, dial [phone] (or star-star-star 777 on the new [employer] telephones) then [phone] to reach the Urology resident on call (for both Adult and Pediatrics)              "

## 2018-09-14 NOTE — PLAN OF CARE
Problem: Patient Care Overview  Goal: Plan of Care/Patient Progress Review  Outcome: No Change  Afebrile. BPs hypertensive - 172/50. Hydralazine given x 1 with improvement. Placed on 2L O2 overnight for comfort. Pt reported SOB, but sats and RR were WDL. She also complained of abdominal fullness/nausea. Received zofran x 1. Also got requip for restless legs. Pt voiding red urine. Adequate UO. Assist of one with walker. Continue to monitor.    Problem: Urine Elimination Impaired (Adult)  Goal: Identify Related Risk Factors and Signs and Symptoms  Related risk factors and signs and symptoms are identified upon initiation of Human Response Clinical Practice Guideline (CPG).   Outcome: No Change   09/14/18 0748   Urine Elimination Impaired   Related Risk Factors (Urine Elimination Impaired) disease process   Signs and Symptoms (Urine Elimination Impaired) other (see comments)

## 2018-09-14 NOTE — PLAN OF CARE
Pt up With SBA.VSS.Gets SOB with exertion,Tryed to wean off 02 but unable.Still on 2L02.Voided at 17:10.210 ml dark red urine,no clots.Pt encouraged to drink.Monitoring U/O.Continue with POC.

## 2018-09-14 NOTE — PROGRESS NOTES
Focus:  Status  D:   Monitoring status  I:    Vitals taken, 98% RA        amb in [place] and room with walker and SBA          Voided a lot after Lasix, slightly red, dr miller, no c/o pain, ect         Tolerated diet           + flatus and said she had several BMs Wednesday           Orders to dc            All dc orders explained with pt voiced understanding, copies given           PIV dc'd by NA          All belongings packed and taken  A:    Denied resp distress/cp nor any other signs observed  P:    Refused the Stool Softeners at dc pharm          Left/wc and secured in vehilce

## 2018-09-17 ENCOUNTER — TELEPHONE (OUTPATIENT)
Dept: FAMILY MEDICINE | Facility: CLINIC | Age: 83
End: 2018-09-17

## 2018-09-17 ENCOUNTER — CARE COORDINATION (OUTPATIENT)
Dept: UROLOGY | Facility: CLINIC | Age: 83
End: 2018-09-17

## 2018-09-17 ENCOUNTER — PATIENT OUTREACH (OUTPATIENT)
Dept: CARE COORDINATION | Facility: CLINIC | Age: 83
End: 2018-09-17

## 2018-09-17 NOTE — TELEPHONE ENCOUNTER
"ED/Discharge Protocol    \"Hi, my name is AWA Al RN, a registered nurse, and I am calling on behalf of Dr. Zapien's office at [place].  I am calling to follow up and see how things are going for you after your recent visit.\"    \"I see that you were in the (ER/UC/IP) on Discharged 9/14/18.    How are you doing now that you are home?\"     Is patient experiencing symptoms that may require a hospital visit?  no    Discharge Instructions    \"Let's review your discharge instructions.  What is/are the follow-up recommendations?  Pt. Response: I was supposed to see my primary in 5 to 7 days  I am seeing urology on Thursday , why should I see the primary?    Per DC Summary was to see PCP to have kidney function rechecked    \"Were you instructed to make a follow-up appointment?\"  Pt. Response: Yes.  Has appointment been made?   No.  \"Can I help you schedule that appointment?\" Scheduled for 9/19/18, Wednesday      \"When you see the provider, I would recommend that you bring your discharge instructions with you.    Medications    \"How many new medications are you on since your hospitalization/ED visit?\"    0-1  \"How many of your current medicines changed (dose, timing, name, etc.) while you were in the hospital/ED visit?\"   2 or more - Epic MTM referral needed  \"Do you have questions about your medications?\"   No  \"Were you newly diagnosed with heart failure, COPD, diabetes or did you have a heart attack?\"   No  For patients on insulin: \"Did you start on insulin in the hospital or did you have your insulin dose changed?\"   No    Medication reconciliation completed? Yes    Was MTM referral placed (*Make sure to put transitions as reason for referral)?   No    Call Summary    \"Do you have any questions or concerns about your condition or care plan at the moment?\"    No  Triage nurse advice given:     Patient was in ER 5 times in the past year (assess appropriateness of ER visits.)      \"If you have questions or things don't " "continue to improve, we encourage you contact us through the main clinic number,  [phone].  Even if the clinic is not open, triage nurses are available 24/7 to help you.     We would like you to know that our clinic has extended hours (provide information).  We also have urgent care (provide details on closest location and hours/contact info)\"      \"Thank you for your time and take care!\"      "

## 2018-09-19 ENCOUNTER — OFFICE VISIT (OUTPATIENT)
Dept: FAMILY MEDICINE | Facility: CLINIC | Age: 83
End: 2018-09-19
Payer: MEDICARE

## 2018-09-19 ENCOUNTER — PRE VISIT (OUTPATIENT)
Dept: UROLOGY | Facility: CLINIC | Age: 83
End: 2018-09-19

## 2018-09-19 VITALS
HEART RATE: 66 BPM | HEIGHT: 57 IN | TEMPERATURE: 98.2 F | BODY MASS INDEX: 31.33 KG/M2 | RESPIRATION RATE: 16 BRPM | WEIGHT: 145.25 LBS | DIASTOLIC BLOOD PRESSURE: 68 MMHG | OXYGEN SATURATION: 97 % | SYSTOLIC BLOOD PRESSURE: 132 MMHG

## 2018-09-19 DIAGNOSIS — F41.1 GAD (GENERALIZED ANXIETY DISORDER): ICD-10-CM

## 2018-09-19 DIAGNOSIS — N17.9 AKI (ACUTE KIDNEY INJURY) (H): Primary | ICD-10-CM

## 2018-09-19 DIAGNOSIS — G47.00 INSOMNIA, UNSPECIFIED TYPE: ICD-10-CM

## 2018-09-19 DIAGNOSIS — I87.303 STASIS EDEMA OF BOTH LOWER EXTREMITIES: ICD-10-CM

## 2018-09-19 DIAGNOSIS — E05.00 GRAVES DISEASE: ICD-10-CM

## 2018-09-19 DIAGNOSIS — I50.30 (HFPEF) HEART FAILURE WITH PRESERVED EJECTION FRACTION (H): ICD-10-CM

## 2018-09-19 LAB — T3 SERPL-MCNC: 188 NG/DL (ref 60–181)

## 2018-09-19 PROCEDURE — 99214 OFFICE O/P EST MOD 30 MIN: CPT | Performed by: FAMILY MEDICINE

## 2018-09-19 PROCEDURE — 84480 ASSAY TRIIODOTHYRONINE (T3): CPT | Performed by: INTERNAL MEDICINE

## 2018-09-19 PROCEDURE — 36415 COLL VENOUS BLD VENIPUNCTURE: CPT | Performed by: FAMILY MEDICINE

## 2018-09-19 PROCEDURE — 84443 ASSAY THYROID STIM HORMONE: CPT | Performed by: FAMILY MEDICINE

## 2018-09-19 PROCEDURE — 80048 BASIC METABOLIC PNL TOTAL CA: CPT | Performed by: FAMILY MEDICINE

## 2018-09-19 PROCEDURE — 84439 ASSAY OF FREE THYROXINE: CPT | Performed by: FAMILY MEDICINE

## 2018-09-19 RX ORDER — TRAZODONE HYDROCHLORIDE 50 MG/1
TABLET, FILM COATED ORAL
Qty: 45 TABLET | Refills: 1 | Status: SHIPPED | OUTPATIENT
Start: 2018-09-19 | End: 2019-01-30

## 2018-09-19 RX ORDER — SERTRALINE HYDROCHLORIDE 100 MG/1
100 TABLET, FILM COATED ORAL EVERY MORNING
Qty: 90 TABLET | Refills: 1 | Status: SHIPPED | OUTPATIENT
Start: 2018-09-19 | End: 2019-02-15

## 2018-09-19 RX ORDER — FUROSEMIDE 20 MG
20 TABLET ORAL EVERY MORNING
Qty: 90 TABLET | Refills: 1 | Status: SHIPPED | OUTPATIENT
Start: 2018-09-19 | End: 2019-02-15

## 2018-09-19 NOTE — PROGRESS NOTES
SUBJECTIVE:   Dimple Oviedo is a 85 year old female who presents to clinic today for the following health issues:          Hospital Follow-up Visit:    Hospital/Nursing Home/IP Rehab Facility: [facility]  Date of Admission: 09/10/2018  Date of Discharge: 09/14/2018  Reason(s) for Admission: stents in kidney             Problems taking medications regularly:  None       Medication changes since discharge: None       Problems adhering to non-medication therapy:  None    Summary of hospitalization:  [facility] discharge summary reviewed  Diagnostic Tests/Treatments reviewed.  Follow up needed: BMP  Other Healthcare Providers Involved in Patient s Care:         urology  Update since discharge: improved.      Post Discharge Medication Reconciliation: discharge medications reconciled, continue medications without change.  Plan of care communicated with patient     Coding guidelines for this visit:  Type of Medical   Decision Making Face-to-Face Visit       within 7 Days of discharge Face-to-Face Visit        within 14 days of discharge   Moderate Complexity [id number]   High Complexity [id number]          Became great grand ma again! Happy about it. Wishes to visit her family next [facility].     Patient had cystoscopy, biopsy and then she had urinary retention. Was hospitalized with acute kidney injury.   On the day of discharge creatinine was good.     Feeling somewhat shaky. Wondering about her thyroid again.     Had some short of breath but getting better.     Some anxiety.      Restless leg syndromes - mostly in the evening.       Problem list and histories reviewed & adjusted, as indicated.  Additional history: as documented    Labs reviewed in EPIC    Reviewed and updated as needed this visit by clinical staff       Reviewed and updated as needed this visit by Provider           Social History     Social History     Marital status:      Spouse name:      Number  of children: 2     Years of education: N/A     Occupational History      Retired     Social History Main Topics     Smoking status: Never Smoker     Smokeless tobacco: Never Used     Alcohol use No      Comment: 6 times per year-one drink     Drug use: No     Sexual activity: Yes     Partners: Male     Other Topics Concern      Service No     Blood Transfusions No     Exercise Yes     curves     Seat Belt Yes     Self-Exams Yes     Parent/Sibling W/ Cabg, Mi Or Angioplasty Before 65f 55m? No     Social History Narrative    December 7, 2009    Balanced Diet - Yes    Osteoporosis Prevention Measures - Dairy servings per day: 2 and Medication/Supplements (See current meds)    Regular Exercise -  Yes Describe curves, walking    Dental Exam - YES - Date: 10/2009    Eye Exam - YES - Date: 2008    Self Breast Exam - Yes    Abuse: Current or Past (Physical, Sexual or Emotional)- No    Do you feel safe in your environment - Yes    Guns stored in the home - No    Sunscreen used - Yes    Seatbelts used - Yes    Lipids -  YES - Date: 11/24/2009    Glucose -  YES - Date: 11/24/2009    Colon Cancer Screening - Colonoscopy 11/18/2005(date completed)    Hemoccults - YES - Date: 10/12/2004    Pap Test -  YES - Date: 09/22/2004    Do you have any concerns about STD's -  No    Mammography - YES - Date: 11/24/2009    DEXA - YES - Date: 11/20/2008    Immunizations reviewed and up to date - Yes    PAULETTE Spencer CMA                 Allergies   Allergen Reactions     No Known Drug Allergies      Patient Active Problem List   Diagnosis     Esophageal reflux     Restless leg syndrome     heat intolerance     Goiter     Disorder of bone and cartilage     Other psoriasis     perirectal cyst     Malignant melanoma of skin of trunk, except scrotum (H)     Nontoxic multinodular goiter     Hyperlipidemia LDL goal <130     Hypertension goal BP (blood pressure) < 140/90     Urinary incontinence     Osteoarthritis of left knee     Hip arthritis  "    Polymyalgia rheumatica (H)     High risk medication use     Shoulder pain     Impaired fasting blood sugar     Chronic bilateral low back pain without sciatica     Obesity, unspecified obesity severity, unspecified obesity type     Iron deficiency anemia, unspecified iron deficiency anemia type     NSTEMI (non-ST elevated myocardial infarction) (H)     Stress-induced cardiomyopathy     A-fib (H)     Anxiety     Chronic systolic heart failure (H)     Urothelial carcinoma (H)     Hyperthyroidism     Restless legs syndrome     CRESENCIO (acute kidney injury) (H)     Hydronephrosis     Reviewed medications, social history and  past medical and surgical history.    Review of system: for general, respiratory, CVS, GI and psychiatry negative except for noted above.     EXAM:  /68 (BP Location: Right arm, Patient Position: Sitting, Cuff Size: Adult Regular)  Pulse 66  Temp 98.2  F (36.8  C) (Oral)  Resp 16  Ht 4' 9\" (1.448 m)  Wt 145 lb 4 oz (65.9 kg)  SpO2 97%  BMI 31.43 kg/m2  Constitutional: healthy, alert and no distress   Psychiatric: mildly anxious  Lungs - clear. No crackles or wheezing.     ASSESSMENT / PLAN:  (N17.9) CRESENCIO (acute kidney injury) (H)  (primary encounter diagnosis)  Comment: BMP was baseline at the time of the discharge.  Okay to recheck it today.  She will be seeing the urologist tomorrow and will be having a plan for her stent removal and also for positive fish result.  Plan: Basic metabolic panel            (I87.303) Stasis edema of both lower extremities  Comment: With heart failure and using 20 mg of Lasix.  Continue the same.  Plan: furosemide (LASIX) 20 MG tablet      (I50.30) (HFpEF) heart failure with preserved ejection fraction (H)  Comment: With heart failure and using 20 mg of Lasix.  Continue the same.  If she has had recurrence of short of breath may need further workup.  Plan: furosemide (LASIX) 20 MG tablet       (F41.1) THERON (generalized anxiety disorder)  Comment: Patient is " tolerating current medication without any major side effects or concerns and current dose seems reasonable too.  Current medication regime is effective. Continue current treatment without any changes.    Plan: sertraline (ZOLOFT) 100 MG tablet      (G47.00) Insomnia, unspecified type  Comment: Patient is tolerating current medication without any major side effects or concerns and current dose seems reasonable too.  Current medication regime is effective. Continue current treatment without any changes.  Plan: traZODone (DESYREL) 50 MG tablet      (E05.00) Graves disease  Comment: Some symptoms of hyperthyroidism.  Plan: She has a standing order for her thyroid rechecked and I will release that order for her.    Follow up: With urologist.    The above note was dictated using voice recognition. Although reviewed after completion, some word and grammatical error may remain .      Patient Instructions   Check your insurance coverage for SHINGRIX vaccine. It is a new shingles vaccine. It is very effective in cutting down chances of shingles. It is  2 shot series that is administered atleast 2 months apart. If it is covered, call our clinic and schedule nurse only appointment to get the vaccine. Some insurance covers it if it is given in the pharmacy. Check with your pharmacy if that is the case.

## 2018-09-19 NOTE — PATIENT INSTRUCTIONS
Check your insurance coverage for SHINGRIX vaccine. It is a new shingles vaccine. It is very effective in cutting down chances of shingles. It is  2 shot series that is administered atleast 2 months apart. If it is covered, call our clinic and schedule nurse only appointment to get the vaccine. Some insurance covers it if it is given in the pharmacy. Check with your pharmacy if that is the case.

## 2018-09-20 ENCOUNTER — OFFICE VISIT (OUTPATIENT)
Dept: UROLOGY | Facility: CLINIC | Age: 83
End: 2018-09-20
Payer: MEDICARE

## 2018-09-20 VITALS
WEIGHT: 141.8 LBS | OXYGEN SATURATION: 97 % | DIASTOLIC BLOOD PRESSURE: 63 MMHG | BODY MASS INDEX: 30.59 KG/M2 | SYSTOLIC BLOOD PRESSURE: 136 MMHG | HEART RATE: 66 BPM | HEIGHT: 57 IN

## 2018-09-20 DIAGNOSIS — C68.9 UROTHELIAL CANCER (H): Primary | ICD-10-CM

## 2018-09-20 LAB
ANION GAP SERPL CALCULATED.3IONS-SCNC: 7 MMOL/L (ref 3–14)
BUN SERPL-MCNC: 17 MG/DL (ref 7–30)
CALCIUM SERPL-MCNC: 8.9 MG/DL (ref 8.5–10.1)
CHLORIDE SERPL-SCNC: 103 MMOL/L (ref 94–109)
CO2 SERPL-SCNC: 28 MMOL/L (ref 20–32)
CREAT SERPL-MCNC: 0.7 MG/DL (ref 0.52–1.04)
GFR SERPL CREATININE-BSD FRML MDRD: 80 ML/MIN/1.7M2
GLUCOSE SERPL-MCNC: 113 MG/DL (ref 70–99)
POTASSIUM SERPL-SCNC: 4.4 MMOL/L (ref 3.4–5.3)
SODIUM SERPL-SCNC: 138 MMOL/L (ref 133–144)
T4 FREE SERPL-MCNC: 2.04 NG/DL (ref 0.76–1.46)
TSH SERPL DL<=0.005 MIU/L-ACNC: <0.01 MU/L (ref 0.4–4)

## 2018-09-20 ASSESSMENT — PAIN SCALES - GENERAL: PAINLEVEL: NO PAIN (0)

## 2018-09-20 NOTE — PATIENT INSTRUCTIONS
Patient will call us [phone] if she decides to schedule surgery with Dr. King.        It was a pleasure meeting with you today.  Thank you for allowing me and my team the privilege of caring for you today.  YOU are the reason we are here, and I truly hope we provided you with the excellent service you deserve.  Please let us know if there is anything else we can do for you so that we can be sure you are leaving completely satisfied with your care experience.      Alona Gerardo MA

## 2018-09-20 NOTE — MR AVS SNAPSHOT
After Visit Summary   9/20/2018    Dimple Oviedo    MRN: [id number]           Patient Information     Date Of Birth          6/23/1933        Visit Information        Provider Department      9/20/2018 2:00 PM Stuart King MD [facility] Urology and [facility] for Prostate and Urologic Cancers        Care Instructions    Patient will call us [phone] if she decides to schedule surgery with Dr. King.        It was a pleasure meeting with you today.  Thank you for allowing me and my team the privilege of caring for you today.  YOU are the reason we are here, and I truly hope we provided you with the excellent service you deserve.  Please let us know if there is anything else we can do for you so that we can be sure you are leaving completely satisfied with your care experience.      Alona Gerardo MA          Follow-ups after your visit        Your next 10 appointments already scheduled     Sep 27, 2018  1:30 PM CDT   (Arrive by 1:15 PM)   NEW ARRHYTHMIA with Butch Griffith MD   [facility] ([facility])    [address] [place] [address]  [address]  [facility] [id number] [phone]            Oct 03, 2018 12:30 PM CDT   (Arrive by 12:15 PM)   Lymphedema Treatment with Crista Jain PT   [facility] ([facility])    [address] [place] [address]  [address]  [facility] [id number] [phone]            Nov 26, 2018  1:30 PM CST   US THYROID with UCUS2   [facility] US ([facility])    [address] [place] [address]  1st Floor  [facility] [id number] [phone]           How do I prepare for my exam? (Food and drink instructions) No Food and Drink Restrictions.  How do I prepare for my exam? (Other instructions) You do not need to do anything special to prepare for your exam.  What should I wear: Wear comfortable clothes.  How long does the exam take: Most ultrasounds take 30 to  60 minutes.  What should I bring: Bring a list of your medicines, including vitamins, minerals and over-the-counter drugs. It is safest to leave personal items at home.  Do I need a :  No  is needed.  What do I need to tell my doctor: Tell your doctor about any allergies you may have.  What should I do after the exam: No restrictions, You may resume normal activities.  What is this test: An ultrasound uses sound waves to make pictures of the body. Sound waves do not cause pain. The only discomfort may be the pressure of the wand against your skin or full bladder.  Who should I call with questions: If you have any questions, please call the Imaging Department where you will have your exam. Directions, parking instructions, and other information is available on our website, [employer]/imaging.            Nov 26, 2018  2:15 PM CST   LAB with [facility] ([facility])    [address] [place] [id number] [phone]           Please do not eat 10-12 hours before your appointment if you are coming in fasting for labs on lipids, cholesterol, or glucose (sugar). This does not apply to pregnant women. Water, hot tea and black coffee (with nothing added) are okay. Do not drink other fluids, diet soda or chew gum.            Dec 04, 2018  1:30 PM CST   (Arrive by 1:15 PM)   RETURN ENDOCRINE with Dhara Meza MD   [facility] ([facility])    [address] [place] [address] [phone]              Who to contact     Please call your clinic at [phone] to:    Ask questions about your health    Make or cancel appointments    Discuss your medicines    Learn about your test results    Speak to your doctor            Additional Information About Your Visit        MyChart Information     [employer] gives you secure access to your electronic health record. If you see a primary care  "provider, you can also send messages to your care team and make appointments. If you have questions, please call your primary care clinic.  If you do not have a primary care provider, please call [phone] and they will assist you.      [email or web address] is an electronic gateway that provides easy, online access to your medical records. With [email or web address], you can request a clinic appointment, read your test results, renew a prescription or communicate with your care team.     To access your existing account, please contact your [facility] or call [phone] for assistance.        Care EveryWhere ID     This is your Care EveryWhere ID. This could be used by other organizations to access your [place] medical records  [id number]        Your Vitals Were     Pulse Height Pulse Oximetry BMI (Body Mass Index)          66 1.448 m (4' 9\") 97% 30.69 kg/m2         Blood Pressure from Last 3 Encounters:   09/20/18 136/63   09/19/18 132/68   09/14/18 163/55    Weight from Last 3 Encounters:   09/20/18 64.3 kg (141 lb 12.8 oz)   09/19/18 65.9 kg (145 lb 4 oz)   09/10/18 66.2 kg (146 lb)              Today, you had the following     No orders found for display         Today's Medication Changes          These changes are accurate as of 9/20/18  2:49 PM.  If you have any questions, ask your nurse or doctor.               These medicines have changed or have updated prescriptions.        Dose/Directions    alendronate 70 MG tablet   Commonly known as:  FOSAMAX   This may have changed:  See the new instructions.   Used for:  High risk medication use, Osteopenia        TAKE 1 TABLET EVERY 7 DAYS AT LEAST 60 MINUTES BEFORE BREAKFAST AS DIRECTED \"SEE PACKAGE FOR ADDITIONAL INSTRUCTIONS\"   Quantity:  12 tablet   Refills:  0       irbesartan 300 MG tablet   Commonly known as:  AVAPRO   This may have changed:    - how much to take  - how to take this  - when to take this  - additional instructions   Used for:  " Essential hypertension with goal blood pressure less than 140/90        TAKE 1 TABLET EVERY DAY   Quantity:  90 tablet   Refills:  2       methimazole 5 MG tablet   Commonly known as:  TAPAZOLE   This may have changed:  when to take this   Used for:  Nontoxic multinodular goiter        Dose:  5 mg   Take 1 tablet (5 mg) by mouth daily   Quantity:  90 tablet   Refills:  1       omeprazole 20 MG CR capsule   Commonly known as:  priLOSEC   This may have changed:  when to take this   Used for:  Gastroesophageal reflux disease without esophagitis        Dose:  20 mg   Take 1 capsule (20 mg) by mouth daily   Quantity:  180 capsule   Refills:  3       propranolol 60 MG 24 hr capsule   Commonly known as:  INDERAL LA   This may have changed:  when to take this   Used for:  Nontoxic multinodular goiter        Dose:  60 mg   Take 1 capsule (60 mg) by mouth daily   Quantity:  90 capsule   Refills:  1                Primary Care Provider Office Phone # Fax #    Pop Antonino Zapien [phone]       [employer] [address] [place] [address]        Equal Access to Services     GUNNER RODRIGUEZ : Hadii aad ku hadasho Soomaali, waaxda luqadaha, qaybta kaalmada adeegyada, maldonado seay . So [facility] [phone].    ATENCIÓN: Si habla español, tiene a sierra disposición servicios gratuitos de asistencia lingüística. [facility] [phone].    We comply with applicable federal civil rights laws and [place] laws. We do not discriminate on the basis of race, color, national origin, age, disability, sex, sexual orientation, or gender identity.            Thank you!     Thank you for choosing [facility] UROLOGY AND [facility] [[email or web address]] FOR PROSTATE AND UROLOGIC CANCERS  for your care. Our goal is always to provide you with excellent care. Hearing back from our patients is one way we can continue to improve our services. Please take a few minutes to complete the written survey that you may receive in the mail after your  "visit with us. Thank you!             Your Updated Medication List - Protect others around you: Learn how to safely use, store and throw away your medicines at www.disposemymeds.org.          This list is accurate as of 9/20/18  2:49 PM.  Always use your most recent med list.                   Brand Name Dispense Instructions for use Diagnosis    acetaminophen 650 MG CR tablet    TYLENOL     Take 650 mg by mouth as needed        alendronate 70 MG tablet    FOSAMAX    12 tablet    TAKE 1 TABLET EVERY 7 DAYS AT LEAST 60 MINUTES BEFORE BREAKFAST AS DIRECTED \"SEE PACKAGE FOR ADDITIONAL INSTRUCTIONS\"    High risk medication use, Osteopenia       aspirin 81 MG tablet      Take 81 mg by mouth daily        CALCIUM + D PO      Take 1 tablet by mouth 2 times daily        colestipol 1 g tablet    COLESTID     Take 1 g by mouth 2 times daily TAKE OTHER MEDS 1 HOUR BEFORE OR 4 HOURS AFTER COLESID        cycloSPORINE 0.05 % ophthalmic emulsion    RESTASIS     Place 1 drop into both eyes 2 times daily        ferrous sulfate 325 (65 Fe) MG Tbec EC tablet      Take 325 mg by mouth daily        Fish Oil 500 MG Caps      Take 1 capsule by mouth 2 times daily        furosemide 20 MG tablet    LASIX    90 tablet    Take 1 tablet (20 mg) by mouth every morning    Stasis edema of both lower extremities, (HFpEF) heart failure with preserved ejection fraction (H)       irbesartan 300 MG tablet    AVAPRO    90 tablet    TAKE 1 TABLET EVERY DAY    Essential hypertension with goal blood pressure less than 140/90       lovastatin 40 MG tablet    MEVACOR    90 tablet    TAKE 1 TABLET AT BEDTIME (HYPERLIPIDEMIA LDL GOAL BELOW 130)    Hyperlipidemia LDL goal <130       Melatonin 10 MG Tabs tablet      Take 10 mg by mouth as needed for sleep        methimazole 5 MG tablet    TAPAZOLE    90 tablet    Take 1 tablet (5 mg) by mouth daily    Nontoxic multinodular goiter       nitroGLYcerin 0.4 MG sublingual tablet    NITROSTAT    25 tablet    For chest " pain place 1 tablet under the tongue every 5 minutes for 3 doses. If symptoms persist 5 minutes after 1st dose call 911.    Elevated troponin       omeprazole 20 MG CR capsule    priLOSEC    180 capsule    Take 1 capsule (20 mg) by mouth daily    Gastroesophageal reflux disease without esophagitis       order for DME     1 each    Equipment being ordered: Knee high compression for B LE 20-30mmHg, Velcro units for night time (consider hybrid sock as foot swelling is less than leg swelling), Donning device    Lymphedema of both lower extremities       PROBIOTIC ADVANCED PO      Take 1 capsule by mouth every morning        propranolol 60 MG 24 hr capsule    INDERAL LA    90 capsule    Take 1 capsule (60 mg) by mouth daily    Nontoxic multinodular goiter       rOPINIRole 0.25 MG tablet    REQUIP    90 tablet    TAKE 1 TABLET(0.25 MG) BY MOUTH EVERY NIGHT AS NEEDED    Restless legs syndrome       senna-docusate 8.6-50 MG per tablet    SENOKOT-S;PERICOLACE    45 tablet    Take 1-2 tablets by mouth 2 times daily To prevent constipation    Acute kidney injury (H)       sertraline 100 MG tablet    ZOLOFT    90 tablet    Take 1 tablet (100 mg) by mouth every morning    THERON (generalized anxiety disorder)       SYSTANE 0.4-0.3 % Soln ophthalmic solution   Generic drug:  polyethylene glycol 0.4%- propylene glycol 0.3%      Place 1 drop into both eyes 2 times daily as needed for dry eyes        traZODone 50 MG tablet    DESYREL    45 tablet    TAKE 1/2 TABLET(25 MG)  AT BEDTIME    Insomnia, unspecified type

## 2018-09-20 NOTE — LETTER
9/20/2018       RE: Dimple Oviedo  [address]  [facility] [address]     Dear Colleague,    Thank you for referring your patient, Dimple Oviedo, to the [facility] UROLOGY AND [facility] FOR PROSTATE AND UROLOGIC CANCERS at [facility]. Please see a copy of my visit note below.    Follow Up   Dimple Oviedo is a very pleasant 85 year old female who presents with a history of positive FISH and atypical cytology and now positive biopsy    She underwent bladder biopsy and washings on 4/3/18, all negative    Repeat biopsy, washings, ureteroscopy and ureteral washings and biopsy showed urothelial cancer on the right side as well as an irregular right kidney.    Continued hematuria over the last month mostly, but episode of minimal hematuria 1.5 weeks ago    Reports some incontinence       Assessment:   History of hematuria, positive FISH, with localization to the right kidney upon washings.    In addition the ureterescopy was also abnormal     Will plan for right robotic nephu with removal of stent.     Post op had some ureteral swelling and acute kidney injury that resolved with stent placement.        Stuart King MD  Department of Urology Staff  [facility]

## 2018-09-20 NOTE — NURSING NOTE
Chief Complaint   Patient presents with     Surgical Followup     kidney injury         Alona Gerardo MA

## 2018-09-21 DIAGNOSIS — E05.00 GRAVES DISEASE: Primary | ICD-10-CM

## 2018-09-24 ENCOUNTER — TELEPHONE (OUTPATIENT)
Dept: UROLOGY | Facility: CLINIC | Age: 83
End: 2018-09-24

## 2018-09-24 ASSESSMENT — ENCOUNTER SYMPTOMS
ARTHRALGIAS: 0
SINUS PAIN: 0
CHILLS: 0
TROUBLE SWALLOWING: 0
ORTHOPNEA: 0
HALLUCINATIONS: 0
DIZZINESS: 0
WEIGHT LOSS: 1
MUSCLE CRAMPS: 0
LIGHT-HEADEDNESS: 0
SPEECH CHANGE: 0
DECREASED APPETITE: 0
FATIGUE: 1
MEMORY LOSS: 0
HEMATURIA: 1
DISTURBANCES IN COORDINATION: 0
SORE THROAT: 0
JOINT SWELLING: 0
HEADACHES: 0
BACK PAIN: 0
PARALYSIS: 0
HOARSE VOICE: 1
TINGLING: 0
ALTERED TEMPERATURE REGULATION: 1
SLEEP DISTURBANCES DUE TO BREATHING: 0
POLYPHAGIA: 1
HYPERTENSION: 1
WEAKNESS: 1
NECK PAIN: 0
SMELL DISTURBANCE: 0
DYSURIA: 0
SWOLLEN GLANDS: 0
NECK MASS: 0
STIFFNESS: 0
MYALGIAS: 0
NIGHT SWEATS: 0
SINUS CONGESTION: 0
NUMBNESS: 0
MUSCLE WEAKNESS: 1
LOSS OF CONSCIOUSNESS: 0
FLANK PAIN: 0
DIFFICULTY URINATING: 0
PALPITATIONS: 0
INCREASED ENERGY: 1
WEIGHT GAIN: 0
LEG PAIN: 0
TASTE DISTURBANCE: 0
HYPOTENSION: 0
SYNCOPE: 0
BRUISES/BLEEDS EASILY: 0
EXERCISE INTOLERANCE: 0
TREMORS: 1
POLYDIPSIA: 0
SEIZURES: 0
FEVER: 0

## 2018-09-24 NOTE — TELEPHONE ENCOUNTER
[facility] Call Center    Phone Message    May a detailed message be left on voicemail: yes    Reason for Call: Other: Please call pt's daughter to discuss procedure pt needs to have done but cannot get in until November     Action Taken: Message routed to:  [facility] ([facility] Urology Clinic

## 2018-09-24 NOTE — TELEPHONE ENCOUNTER
Patient's family called and wants to know when she can have surgery with dr goddard.  Message sent to put in surgical orders. Zulema Smith LPN Staff Nurse

## 2018-09-26 ENCOUNTER — PRE VISIT (OUTPATIENT)
Dept: CARDIOLOGY | Facility: CLINIC | Age: 83
End: 2018-09-26

## 2018-09-26 DIAGNOSIS — G25.81 RESTLESS LEGS SYNDROME: ICD-10-CM

## 2018-09-26 DIAGNOSIS — Z79.899 HIGH RISK MEDICATION USE: ICD-10-CM

## 2018-09-26 DIAGNOSIS — M85.80 OSTEOPENIA: ICD-10-CM

## 2018-09-26 RX ORDER — ALENDRONATE SODIUM 70 MG/1
TABLET ORAL
Qty: 12 TABLET | Refills: 0 | Status: SHIPPED | OUTPATIENT
Start: 2018-09-26 | End: 2018-12-27

## 2018-09-26 RX ORDER — ROPINIROLE 0.25 MG/1
TABLET, FILM COATED ORAL
Qty: 90 TABLET | Refills: 1 | Status: SHIPPED | OUTPATIENT
Start: 2018-09-26 | End: 2018-10-22

## 2018-09-26 NOTE — TELEPHONE ENCOUNTER
Prescription approved per [facility] [place] Refill Protocol.  ITALO Gill, BSN, RN

## 2018-09-26 NOTE — TELEPHONE ENCOUNTER
"Requested Prescriptions   Pending Prescriptions Disp Refills     alendronate (FOSAMAX) 70 MG tablet [Pharmacy Med Name: ALENDRONATE SODIUM 70 MG Tablet]  Last Written Prescription Date:  5/4/2018  Last Fill Quantity: 12 tablet,  # refills: 0   Last Office Visit: 9/19/2018   Future Office Visit:      12 tablet 0     Sig: TAKE 1 TABLET EVERY 7 DAYS AT LEAST 60 MINUTES BEFORE BREAKFAST AS DIRECTED. SEE PACKAGE FOR ADDITIONAL INSTRUCTIONS    Bisphosphonates Passed    9/26/2018  2:21 PM       Passed - Recent (12 mo) or future (30 days) visit within the authorizing provider's specialty    Patient had office visit in the last 12 months or has a visit in the next 30 days with authorizing provider or within the authorizing provider's specialty.  See \"Patient Info\" tab in inbasket, or \"Choose Columns\" in Meds & Orders section of the refill encounter.           Passed - Dexa on file within past 2 years    Please review last Dexa result.          Passed - Patient is age 18 or older       Passed - Normal serum creatinine on file within past 12 months    Recent Labs   Lab Test  09/19/18   1512   CR  0.70               "

## 2018-09-26 NOTE — TELEPHONE ENCOUNTER
"Requested Prescriptions   Pending Prescriptions Disp Refills     rOPINIRole (REQUIP) 0.25 MG tablet  Last Written Prescription Date:  7/13/2018  Last Fill Quantity: 90 tablet,  # refills: 0   Last Office Visit: 9/19/2018   Future Office Visit:      90 tablet 0    Antiparkinson's Agents Protocol Passed    9/26/2018 11:25 AM       Passed - Blood pressure under 140/90 in past 12 months    BP Readings from Last 3 Encounters:   09/20/18 136/63   09/19/18 132/68   09/14/18 163/55          Passed - CBC on record in past 12 months    Recent Labs   Lab Test  09/14/18   0744   WBC  7.5   RBC  3.31*   HGB  9.1*   HCT  29.3*   PLT  207     For [facility] ONLY: SKIL101 = WBC, NRRR142 = RBC         Passed - ALT on record in past 12 months        Recent Labs   Lab Test  09/11/18   0612   ALT  92*          Passed - Serum Creatinine on file in past 12 months    Recent Labs   Lab Test  09/19/18   1512   CR  0.70          Passed - Patient is age 18 or older       Passed - No active pregnancy on record       Passed - No positive pregnancy test in the past 12 months       Passed - Recent (6 mo) or future (30 days) visit within the authorizing provider's specialty    Patient had office visit in the last 6 months or has a visit in the next 30 days with authorizing provider or within the authorizing provider's specialty.  See \"Patient Info\" tab in inbasket, or \"Choose Columns\" in Meds & Orders section of the refill encounter.              "

## 2018-09-27 ENCOUNTER — OFFICE VISIT (OUTPATIENT)
Dept: CARDIOLOGY | Facility: CLINIC | Age: 83
End: 2018-09-27
Attending: INTERNAL MEDICINE
Payer: MEDICARE

## 2018-09-27 VITALS
DIASTOLIC BLOOD PRESSURE: 67 MMHG | HEIGHT: 57 IN | WEIGHT: 141 LBS | OXYGEN SATURATION: 97 % | BODY MASS INDEX: 30.42 KG/M2 | SYSTOLIC BLOOD PRESSURE: 116 MMHG | HEART RATE: 76 BPM

## 2018-09-27 DIAGNOSIS — E05.00 GRAVES DISEASE: ICD-10-CM

## 2018-09-27 DIAGNOSIS — I48.0 PAROXYSMAL ATRIAL FIBRILLATION (H): Primary | ICD-10-CM

## 2018-09-27 LAB
T3 SERPL-MCNC: 186 NG/DL (ref 60–181)
T4 FREE SERPL-MCNC: 1.71 NG/DL (ref 0.76–1.46)
TSH SERPL DL<=0.005 MIU/L-ACNC: <0.01 MU/L (ref 0.4–4)

## 2018-09-27 PROCEDURE — G0463 HOSPITAL OUTPT CLINIC VISIT: HCPCS | Mod: 25,ZF

## 2018-09-27 PROCEDURE — 93005 ELECTROCARDIOGRAM TRACING: CPT | Mod: ZF

## 2018-09-27 PROCEDURE — 93010 ELECTROCARDIOGRAM REPORT: CPT | Mod: ZP | Performed by: INTERNAL MEDICINE

## 2018-09-27 PROCEDURE — 99214 OFFICE O/P EST MOD 30 MIN: CPT | Mod: GC | Performed by: INTERNAL MEDICINE

## 2018-09-27 PROCEDURE — 84480 ASSAY TRIIODOTHYRONINE (T3): CPT | Performed by: INTERNAL MEDICINE

## 2018-09-27 RX ORDER — DIAZEPAM 2 MG
TABLET ORAL
COMMUNITY
Start: 2018-02-06 | End: 2018-10-31

## 2018-09-27 ASSESSMENT — PAIN SCALES - GENERAL: PAINLEVEL: MILD PAIN (2)

## 2018-09-27 NOTE — NURSING NOTE
Chief Complaint   Patient presents with     Follow Up For     EKG- PAF, follow up from [facility] (7/23/18) for Afib. was seen initially 1/16/18 while in hospital for AFw/RVR, hematuria, HF     Medications reviewed and vitals performed.  Genie Chiu CMA

## 2018-09-27 NOTE — MR AVS SNAPSHOT
After Visit Summary   9/27/2018    Dimple Oviedo    MRN: [id number]           Patient Information     Date Of Birth          6/23/1933        Visit Information        Provider Department      9/27/2018 1:30 PM Butch Griffith MD [facility]        Today's Diagnoses     Paroxysmal atrial fibrillation (H)    -  1       Follow-ups after your visit        Your next 10 appointments already scheduled     Oct 03, 2018 12:30 PM CDT   (Arrive by 12:15 PM)   Lymphedema Treatment with Crista Jain PT   [facility] ([facility])    [address]  [address]  [place] [address]   [phone]            Nov 26, 2018  1:30 PM CST   US THYROID with UCUS2   [facility] [place] [facility] ([facility])    [address]  [address]  [place] [id number] [phone]           How do I prepare for my exam? (Food and drink instructions) No Food and Drink Restrictions.  How do I prepare for my exam? (Other instructions) You do not need to do anything special to prepare for your exam.  What should I wear: Wear comfortable clothes.  How long does the exam take: Most ultrasounds take 30 to 60 minutes.  What should I bring: Bring a list of your medicines, including vitamins, minerals and over-the-counter drugs. It is safest to leave personal items at home.  Do I need a :  No  is needed.  What do I need to tell my doctor: Tell your doctor about any allergies you may have.  What should I do after the exam: No restrictions, You may resume normal activities.  What is this test: An ultrasound uses sound waves to make pictures of the body. Sound waves do not cause pain. The only discomfort may be the pressure of the wand against your skin or full bladder.  Who should I call with questions: If you have any questions, please call the Imaging Department where you will have your exam. Directions, parking instructions,  and other information is available on our website, [email or web address].            Nov 26, 2018  2:15 PM CST   LAB with  LAB   [facility] ([facility])    [address] [place] [id number] [phone]           Please do not eat 10-12 hours before your appointment if you are coming in fasting for labs on lipids, cholesterol, or glucose (sugar). This does not apply to pregnant women. Water, hot tea and black coffee (with nothing added) are okay. Do not drink other fluids, diet soda or chew gum.            Dec 04, 2018  1:30 PM CST   (Arrive by 1:15 PM)   RETURN ENDOCRINE with Dhara Meza MD   [facility] ([facility])    [address] [place] [id number] [phone]              Future tests that were ordered for you today     Open Future Orders        Priority Expected Expires Ordered    TSH Routine 10/27/2018 12/27/2018 9/27/2018    T4 free Routine 10/27/2018 12/27/2018 9/27/2018    T3 total Routine 10/27/2018 12/27/2018 9/27/2018            Who to contact     If you have questions or need follow up information about today's clinic visit or your schedule please contact [facility] directly at [phone].  Normal or non-critical lab and imaging results will be communicated to you by [employer], letter or phone within 4 business days after the clinic has received the results. If you do not hear from us within 7 days, please contact the clinic through [employer] or phone. If you have a critical or abnormal lab result, we will notify you by phone as soon as possible.  Submit refill requests through [employer] or call your pharmacy and they will forward the refill request to us. Please allow 3 business days for your refill to be completed.          Additional Information About Your Visit        [employer] Information     [employer] gives you secure access to your electronic health record. If you  "see a primary care provider, you can also send messages to your care team and make appointments. If you have questions, please call your primary care clinic.  If you do not have a primary care provider, please call [phone] and they will assist you.        Care EveryWhere ID     This is your Care EveryWhere ID. This could be used by other organizations to access your [facility] medical records  [id number]        Your Vitals Were     Pulse Height Pulse Oximetry BMI (Body Mass Index)          76 1.448 m (4' 9\") 97% 30.51 kg/m2         Blood Pressure from Last 3 Encounters:   09/27/18 116/67   09/20/18 136/63   09/19/18 132/68    Weight from Last 3 Encounters:   09/27/18 64 kg (141 lb)   09/20/18 64.3 kg (141 lb 12.8 oz)   09/19/18 65.9 kg (145 lb 4 oz)              We Performed the Following     EKG 12-lead, tracing only (Same Day)          Today's Medication Changes          These changes are accurate as of 9/27/18 11:59 PM.  If you have any questions, ask your nurse or doctor.               These medicines have changed or have updated prescriptions.        Dose/Directions    irbesartan 300 MG tablet   Commonly known as:  AVAPRO   This may have changed:    - how much to take  - how to take this  - when to take this  - additional instructions   Used for:  Essential hypertension with goal blood pressure less than 140/90        TAKE 1 TABLET EVERY DAY   Quantity:  90 tablet   Refills:  2       methimazole 5 MG tablet   Commonly known as:  TAPAZOLE   This may have changed:  when to take this   Used for:  Nontoxic multinodular goiter        Dose:  5 mg   Take 1 tablet (5 mg) by mouth daily   Quantity:  90 tablet   Refills:  1       omeprazole 20 MG CR capsule   Commonly known as:  priLOSEC   This may have changed:  when to take this   Used for:  Gastroesophageal reflux disease without esophagitis        Dose:  20 mg   Take 1 capsule (20 mg) by mouth daily   Quantity:  180 capsule   Refills:  3       propranolol 60 MG " 24 hr capsule   Commonly known as:  INDERAL LA   This may have changed:  when to take this   Used for:  Nontoxic multinodular goiter        Dose:  60 mg   Take 1 capsule (60 mg) by mouth daily   Quantity:  90 capsule   Refills:  1                Primary Care Provider Office Phone # Fax #    Pop Antonino Zapien [phone] [address] [place], [place] [id number]        Equal Access to Services     GUNNER RODRIGUEZ : Hadii aad ku hadasho Soomaali, waaxda luqadaha, qaybta kaalmada adeegyada, waxay dayain hayhumairan adeurszula tracy geena . So [facility] [phone].    ATENCIÓN: Si habla español, tiene a sierra disposición servicios gratuitos de asistencia lingüística. Llame al [phone].    We comply with applicable federal civil rights laws and [place] laws. We do not discriminate on the basis of race, color, national origin, age, disability, sex, sexual orientation, or gender identity.            Thank you!     Thank you for choosing [facility]  for your care. Our goal is always to provide you with excellent care. Hearing back from our patients is one way we can continue to improve our services. Please take a few minutes to complete the written survey that you may receive in the mail after your visit with us. Thank you!             Your Updated Medication List - Protect others around you: Learn how to safely use, store and throw away your medicines at www.disposemymeds.org.          This list is accurate as of 9/27/18 11:59 PM.  Always use your most recent med list.                   Brand Name Dispense Instructions for use Diagnosis    acetaminophen 650 MG CR tablet    TYLENOL     Take 650 mg by mouth as needed        alendronate 70 MG tablet    FOSAMAX    12 tablet    TAKE 1 TABLET EVERY 7 DAYS AT LEAST 60 MINUTES BEFORE BREAKFAST AS DIRECTED. SEE PACKAGE FOR ADDITIONAL INSTRUCTIONS    High risk medication use, Osteopenia       aspirin 81 MG tablet      Take 81 mg by mouth daily         CALCIUM + D PO      Take 1 tablet by mouth 2 times daily        colestipol 1 g tablet    COLESTID     Take 1 g by mouth 2 times daily TAKE OTHER MEDS 1 HOUR BEFORE OR 4 HOURS AFTER COLESID        cycloSPORINE 0.05 % ophthalmic emulsion    RESTASIS     Place 1 drop into both eyes 2 times daily        diazepam 2 MG tablet    VALIUM          ferrous sulfate 325 (65 Fe) MG Tbec EC tablet      Take 325 mg by mouth daily        Fish Oil 500 MG Caps      Take 1 capsule by mouth 2 times daily        furosemide 20 MG tablet    LASIX    90 tablet    Take 1 tablet (20 mg) by mouth every morning    Stasis edema of both lower extremities, (HFpEF) heart failure with preserved ejection fraction (H)       irbesartan 300 MG tablet    AVAPRO    90 tablet    TAKE 1 TABLET EVERY DAY    Essential hypertension with goal blood pressure less than 140/90       lovastatin 40 MG tablet    MEVACOR    90 tablet    TAKE 1 TABLET AT BEDTIME (HYPERLIPIDEMIA LDL GOAL BELOW 130)    Hyperlipidemia LDL goal <130       Melatonin 10 MG Tabs tablet      Take 10 mg by mouth as needed for sleep        methimazole 5 MG tablet    TAPAZOLE    90 tablet    Take 1 tablet (5 mg) by mouth daily    Nontoxic multinodular goiter       nitroGLYcerin 0.4 MG sublingual tablet    NITROSTAT    25 tablet    For chest pain place 1 tablet under the tongue every 5 minutes for 3 doses. If symptoms persist 5 minutes after 1st dose call 911.    Elevated troponin       omeprazole 20 MG CR capsule    priLOSEC    180 capsule    Take 1 capsule (20 mg) by mouth daily    Gastroesophageal reflux disease without esophagitis       order for DME     1 each    Equipment being ordered: Knee high compression for B LE 20-30mmHg, Velcro units for night time (consider hybrid sock as foot swelling is less than leg swelling), Donning device    Lymphedema of both lower extremities       PROBIOTIC ADVANCED PO      Take 1 capsule by mouth every morning        propranolol 60 MG 24 hr capsule     INDERAL LA    90 capsule    Take 1 capsule (60 mg) by mouth daily    Nontoxic multinodular goiter       rOPINIRole 0.25 MG tablet    REQUIP    90 tablet    TAKE 1 TABLET(0.25 MG) BY MOUTH EVERY NIGHT AS NEEDED    Restless legs syndrome       senna-docusate 8.6-50 MG per tablet    SENOKOT-S;PERICOLACE    45 tablet    Take 1-2 tablets by mouth 2 times daily To prevent constipation    Acute kidney injury (H)       sertraline 100 MG tablet    ZOLOFT    90 tablet    Take 1 tablet (100 mg) by mouth every morning    THERON (generalized anxiety disorder)       SYSTANE 0.4-0.3 % Soln ophthalmic solution   Generic drug:  polyethylene glycol 0.4%- propylene glycol 0.3%      Place 1 drop into both eyes 2 times daily as needed for dry eyes        traZODone 50 MG tablet    DESYREL    45 tablet    TAKE 1/2 TABLET(25 MG)  AT BEDTIME    Insomnia, unspecified type

## 2018-09-27 NOTE — LETTER
9/27/2018      RE: Dimple Oviedo  [address]  [facility] [place] [address]       Dear Colleague,    Thank you for the opportunity to participate in the care of your patient, Dimple Oviedo, at the [facility]. Please see a copy of my visit note below.    HPI: 85 year old female with a past medical history including HTN, multinodular goiter, GERD, hyperthyroidism, history of atrial fibrillation with RVR, and recent stress cardiomyopathy with recovered EF. Presents to clinic for CORE follow-up. She presented to [facility] 12/13 with persistent chest pain and shortness of breath, and was found to have an elevated troponin. EKG negative for ischemic changes. Chest CT negative for PE, but showed bilateral pleural effusions. Echo showed normal LV size with mild LVH, EF 30-35%, and global akinesis with sparing of the basal segments. Coronary angiogram revealed no obstructive CAD.  Findings all suggested stress-induced cardiomyopathy, so she was discharged on BB, ARB, statin, and aspirin. She was then admitted overnight on 1/16 for palpitations and was found to be in afib with RVR in the setting of volume overload and ongoing hyperthyroidism (though free t4 was normal that admission). She converted to sinus spontaneously. She was given a one-time dose of xarelto and developed significant hematuria so this was d/c'ed.  She is now s/p bladder mass bx which were negative for malignancy. An echo during that admission showed a normalized EF. In May she had a ziopatch that showed some SVTs. Most recently she presented to ED 7/16 with shortness of breath. She was treated with Zpack and albuterol for bronchitis.     No evidence of any atrial fibrillation during a two-week monitoring period. Patient had a Ziopatch In June which showed she did report one episode of symptoms that corresponded to sinus rhythm.  She also had runs of very short supraventricular  tachycardia, that were asymptomatic.  Of note, she states that she has hyperthyroidism for several years and that her hyperparathyroidism has been getting worse over the last several months.      PAST MEDICAL HISTORY:  Past Medical History:   Diagnosis Date     Calculus of kidney      Esophageal reflux      GERD (gastroesophageal reflux disease)      Hyperlipidemia LDL goal <130 5/9/2010     Malignant melanoma of skin of trunk, except scrotum (H)      Nonspecific abnormal finding     has living will 2004 -      Nontoxic multinodular goiter     no further eval /tx rec per pt     Osteopenia      Other psoriasis      Personal history of colonic polyps      PMR (polymyalgia rheumatica) (H)      Stress-induced cardiomyopathy      Undiagnosed cardiac murmurs      Unspecified constipation      Unspecified essential hypertension        CURRENT MEDICATIONS:  Current Outpatient Prescriptions   Medication Sig Dispense Refill     acetaminophen (TYLENOL) 650 MG CR tablet Take 650 mg by mouth as needed       alendronate (FOSAMAX) 70 MG tablet TAKE 1 TABLET EVERY 7 DAYS AT LEAST 60 MINUTES BEFORE BREAKFAST AS DIRECTED. SEE PACKAGE FOR ADDITIONAL INSTRUCTIONS 12 tablet 0     aspirin 81 MG tablet Take 81 mg by mouth daily       Calcium Citrate-Vitamin D (CALCIUM + D PO) Take 1 tablet by mouth 2 times daily        colestipol (COLESTID) 1 g tablet Take 1 g by mouth 2 times daily TAKE OTHER MEDS 1 HOUR BEFORE OR 4 HOURS AFTER COLESID       cycloSPORINE (RESTASIS) 0.05 % ophthalmic emulsion Place 1 drop into both eyes 2 times daily       diazepam (VALIUM) 2 MG tablet        ferrous sulfate 325 (65 Fe) MG TBEC EC tablet Take 325 mg by mouth daily       furosemide (LASIX) 20 MG tablet Take 1 tablet (20 mg) by mouth every morning 90 tablet 1     irbesartan (AVAPRO) 300 MG tablet TAKE 1 TABLET EVERY DAY (Patient taking differently: Take 300 mg by mouth every morning TAKE 1 TABLET EVERY DAY) 90 tablet 2     lovastatin (MEVACOR) 40 MG tablet  TAKE 1 TABLET AT BEDTIME (HYPERLIPIDEMIA LDL GOAL BELOW 130) 90 tablet 2     Melatonin 10 MG TABS tablet Take 10 mg by mouth as needed for sleep        methimazole (TAPAZOLE) 5 MG tablet Take 1 tablet (5 mg) by mouth daily (Patient taking differently: Take 5 mg by mouth every morning ) 90 tablet 1     nitroGLYcerin (NITROSTAT) 0.4 MG sublingual tablet For chest pain place 1 tablet under the tongue every 5 minutes for 3 doses. If symptoms persist 5 minutes after 1st dose call 911. 25 tablet 3     Omega-3 Fatty Acids (FISH OIL) 500 MG CAPS Take 1 capsule by mouth 2 times daily        omeprazole (PRILOSEC) 20 MG CR capsule Take 1 capsule (20 mg) by mouth daily (Patient taking differently: Take 20 mg by mouth every morning ) 180 capsule 3     order for DME Equipment being ordered: Knee high compression for B LE 20-30mmHg, Velcro units for night time (consider hybrid sock as foot swelling is less than leg swelling), Donning device 1 each 2     polyethylene glycol 0.4%- propylene glycol 0.3% (SYSTANE) 0.4-0.3 % SOLN ophthalmic solution Place 1 drop into both eyes 2 times daily as needed for dry eyes        Probiotic Product (PROBIOTIC ADVANCED PO) Take 1 capsule by mouth every morning        propranolol (INDERAL LA) 60 MG 24 hr capsule Take 1 capsule (60 mg) by mouth daily (Patient taking differently: Take 60 mg by mouth every morning ) 90 capsule 1     rOPINIRole (REQUIP) 0.25 MG tablet TAKE 1 TABLET(0.25 MG) BY MOUTH EVERY NIGHT AS NEEDED 90 tablet 1     senna-docusate (SENOKOT-S;PERICOLACE) 8.6-50 MG per tablet Take 1-2 tablets by mouth 2 times daily To prevent constipation 45 tablet 0     sertraline (ZOLOFT) 100 MG tablet Take 1 tablet (100 mg) by mouth every morning 90 tablet 1     traZODone (DESYREL) 50 MG tablet TAKE 1/2 TABLET(25 MG)  AT BEDTIME 45 tablet 1       PAST SURGICAL HISTORY:  Past Surgical History:   Procedure Laterality Date     BIOPSY       C NONSPECIFIC PROCEDURE  2005    colonoscopy polyp repeat  2010     COLONOSCOPY  2014     COMBINED CYSTOSCOPY, INSERT STENT URETER(S) Bilateral 9/12/2018    Procedure: COMBINED CYSTOSCOPY, INSERT STENT URETER(S);  Cystoscopy Bilateral ureteral Stent Placement.;  Surgeon: Justin Henry MD;  Location: [facility] OR     COMBINED CYSTOSCOPY, RETROGRADES, URETEROSCOPY, INSERT STENT Bilateral 4/3/2018    Procedure: COMBINED CYSTOSCOPY, RETROGRADES, URETEROSCOPY, INSERT STENT;;  Surgeon: Stuart King MD;  Location: [facility] OR     COMBINED CYSTOSCOPY, RETROGRADES, URETEROSCOPY, INSERT STENT Bilateral 9/10/2018    Procedure: COMBINED CYSTOSCOPY, RETROGRADES, URETEROSCOPY, INSERT STENT;  Cystoscopy, Bilateral Ureteroscopy, Bladder Biopsies, Retrogram Pyelograms, Ureteral Washings and brushings, cysview;  Surgeon: Stuart King MD;  Location: [facility] OR     CYSTOSCOPY, BIOPSY BLADDER INSTILL OPTICAL AGENT N/A 4/3/2018    Procedure: CYSTOSCOPY, BIOPSY BLADDER INSTILL OPTICAL AGENT;  Cystoscopy, Blue Light Cystoscopy, Bladder Biopsies, Bilateral Selective ureteral washings for Cytology, Bilateral Retrograde Pyelograms, Bilateral Ureteroscopy;  Surgeon: Stuart King MD;  Location: [facility] OR     CYSTOSCOPY, BIOPSY BLADDER, COMBINED N/A 2/19/2018    Procedure: COMBINED CYSTOSCOPY, BIOPSY BLADDER;  Cystoscopy, Bladder Biopsy;  Surgeon: Kenna La MD;  Location: [facility] OR     ENDOSCOPIC ULTRASOUND LOWER GASTROINTESTIONAL TRACT (GI) N/A 10/30/2015    Procedure: ENDOSCOPIC ULTRASOUND LOWER GASTROINTESTIONAL TRACT (GI);  Surgeon: Daniel Jean-Baptiste MD;  Location: [facility] OR     EYE SURGERY  12/4/17     LAPAROSCOPIC CHOLECYSTECTOMY WITH CHOLANGIOGRAMS N/A 11/1/2015    Procedure: LAPAROSCOPIC CHOLECYSTECTOMY WITH CHOLANGIOGRAMS;  Surgeon: Tonie Warren MD;  Location: [facility] OR     SURGICAL HISTORY OF -   1996    malignant melanoma     SURGICAL HISTORY OF -   1968    thyroid nodule     SURGICAL HISTORY OF -       D & C       ALLERGIES:     Allergies   Allergen  "Reactions     No Known Drug Allergies        FAMILY HISTORY:  Family History   Problem Relation Age of Onset     Cancer Father      dec - esophageal and laryngeal     HEART DISEASE Mother      Respiratory Mother      dec     Breast Cancer Daughter      Other Cancer Daughter      Thyroid Disease Daughter      Asthma Daughter      Hyperlipidemia Son      Diabetes Son      - Premature coronary artery disease  - Atrial fibrillation  - Sudden cardiac death     SOCIAL HISTORY:  Social History   Substance Use Topics     Smoking status: Never Smoker     Smokeless tobacco: Never Used     Alcohol use No      Comment: 6 times per year-one drink       ROS:   Constitutional: No fever, chills, or sweats. Weight stable.   ENT: No visual disturbance, ear ache, epistaxis, sore throat.   Cardiovascular: As per HPI.   Respiratory: No cough, hemoptysis.    GI: No nausea, vomiting, hematemesis, melena, or hematochezia.   : No hematuria.   Integument: Negative.   Psychiatric: Negative.   Hematologic:  Easy bruising, no easy bleeding.  Neuro: Negative.   Endocrinology: No significant heat or cold intolerance   Musculoskeletal: No myalgia.    Exam:  /67 (BP Location: Left arm, Patient Position: Chair, Cuff Size: Adult Regular)  Pulse 76  Ht 1.448 m (4' 9\")  Wt 64 kg (141 lb)  SpO2 97%  BMI 30.51 kg/m2  GENERAL APPEARANCE: healthy, alert and no distress  HEENT: no icterus, no xanthelasmas, normal pupil size and reaction, normal palate, mucosa moist, no central cyanosis  NECK: no adenopathy, no asymmetry, masses, or scars, thyroid normal to palpation and no bruits, JVP not elevated  RESPIRATORY: lungs clear to auscultation - no rales, rhonchi or wheezes, no use of accessory muscles, no retractions, respirations are unlabored, normal respiratory rate  CARDIOVASCULAR: regular rhythm, normal S1 with physiologic split S2, no S3 or S4 and no murmur, click or rub, precordium quiet with normal PMI.  ABDOMEN: soft, non tender, without " hepatosplenomegaly, no masses palpable, bowel sounds normal, aorta not enlarged by palpation, no abdominal bruits  EXTREMITIES: peripheral pulses normal, no edema, no bruits  NEURO: alert and oriented to person/place/time, normal speech, gait and affect  VASC: Radial, femoral, dorsalis pedis and posterior tibialis pulses are normal in volumes and symmetric bilaterally. No bruits are heard.  SKIN: no ecchymoses, no rashes    Labs:  CBC RESULTS:   Lab Results   Component Value Date    WBC 7.5 2018    RBC 3.31 (L) 2018    HGB 9.1 (L) 2018    HCT 29.3 (L) 2018    MCV 89 2018    MCH 27.5 2018    MCHC 31.1 (L) 2018    RDW 15.0 2018     2018       BMP RESULTS:  Lab Results   Component Value Date     2018    POTASSIUM 4.4 2018    CHLORIDE 103 2018    CO2 28 2018    ANIONGAP 7 2018     (H) 2018    BUN 17 2018    CR 0.70 2018    GFRESTIMATED 80 2018    GFRESTBLACK >90 2018    SHREYA 8.9 2018        INR RESULTS:  Lab Results   Component Value Date    INR 1.71 (H) 2018    INR 1.02 2017    INR 0.95 2015    INR 1.00 10/30/2015       Procedures:  PULMONARY FUNCTION TESTS:   No flowsheet data found.      ECHOCARDIOGRAM:   Recent Results (from the past 8760 hour(s))   Echocardiogram Complete    Narrative    [id number]  [facility]19  [id number]  [id number]^LEONEL^VLADIMIR^[facility]  Echocardiography Laboratory  [address] [place] [address]     Name: QUIN VALDEZ  MRN: [id number]  : 1933  Study Date: 2018 08:45 AM  Age: 85 yrs  Gender: Female  Patient Location: [facility]  Reason For Study: SOB  Ordering Physician: VLADIMIR CASTANEDA  Performed By: ANIBAL Phelps     BSA: 1.6 m2  Height: 57 in  Weight: 146 lb  BP: 152/51 mmHg  _____________________________________________________________________________  __         Procedure  Complete Portable Echo Adult.  _____________________________________________________________________________  __        Interpretation Summary  Left ventricular function, chamber size, wall motion, and wall thickness are  normal.The EF is 55-60%.  Right ventricular function, chamber size, wall motion, and thickness are  normal.  Severe left atrial enlargement is present.  Aortic valve poorly visualized but appears to have moderate calcification and  at least mild-moderate stenosis.  Mild to moderate aortic insufficiency is present.  IVC measures 2.47cm and collapses with inspiration. Estimated mean right  atrial pressure is 8 mmHg (mildly elevated).  No pericardial effusion is present.     _____________________________________________________________________________  __        Left Ventricle  Left ventricular function, chamber size, wall motion, and wall thickness are  normal.The EF is 55-60%. Left ventricular size is normal. Relative wall  thickness is increased consistent with concentric remodeling. Grade II or  moderate diastolic dysfunction. No regional wall motion abnormalities are  seen.     Right Ventricle  Right ventricular function, chamber size, wall motion, and thickness are  normal.     Atria  Severe left atrial enlargement is present. Mild right atrial enlargement is  present.        Mitral Valve  The mitral valve is normal. Trace mitral insufficiency is present.     Aortic Valve  Moderate to severe aortic valve sclerosis is present. Mild to moderate aortic  insufficiency is present. Mild to moderate aortic stenosis is present. The  peak aortic velocity is 2.8 m/sec. The V2/V1 ratio is .43.     Tricuspid Valve  The tricuspid valve is normal. Moderate tricuspid insufficiency is present.  Right ventricular systolic pressure is 43mmHg above the right atrial pressure.  Suggests mild-moderate pulmonary hypertension.     Pulmonic Valve  The pulmonic valve is normal.     Vessels  IVC measures  2.47cm and collapses with inspiration.Aortic root and proximal  ascending aorta are normal in size. Dilation of the inferior vena cava is  present with normal respiratory variation in diameter. Estimated mean right  atrial pressure is 8 mmHg (mildly elevated).     Pericardium  No pericardial effusion is present.        Compared to Previous Study  compared to the previous study done on 18 there has been no significant  change.     Attestation  I have personally viewed the imaging and agree with the interpretation and  report as documented by the fellow, Tana Martinez, and/or edited by me.  _____________________________________________________________________________  __     MMode/2D Measurements & Calculations  IVSd: 0.85 cm  LVIDd: 4.3 cm  LVIDs: 3.0 cm  LVPWd: 1.1 cm  FS: 30.2 %  LV mass(C)d: 139.4 grams  LV mass(C)dI: 88.6 grams/m2  LVOT diam: 2.0 cm  LVOT area: 3.1 cm2  LA Volume (BP): 86.1 ml     LA Volume Index (BP): 54.8 ml/m2  RWT: 0.52        Doppler Measurements & Calculations  MV E max rick: 136.0 cm/sec  MV A max rick: 105.0 cm/sec  MV E/A: 1.3  MV dec time: 0.19 sec  Ao V2 max: 279.0 cm/sec  Ao max P.1 mmHg  Ao V2 mean: 206.0 cm/sec  Ao mean P.0 mmHg  Ao V2 VTI: 66.1 cm  PAUL(I,D): 1.1 cm2  PAUL(V,D): 1.2 cm2  AI P1/2t: 340.6 msec  LV V1 max P.8 mmHg  LV V1 max: 109.0 cm/sec  LV V1 VTI: 23.3 cm  SV(LVOT): 73.2 ml  SI(LVOT): 46.5 ml/m2  TR max rick: 327.0 cm/sec  TR max P.8 mmHg  AV Rick Ratio (DI): 0.39  PAUL Index (cm2/m2): 0.70  E/E' av.0  Lateral E/e': 19.3  Medial E/e': 26.6        _____________________________________________________________________________  __           Report approved by: Ade Tierney 2018 12:01 PM      Echocardiogram Complete    Narrative    [id number]  [id number]  [id number]  [id number]^THOMAS^JORDON^T           [facility],[place]  Echocardiography Laboratory  [address] [place] [id number]     Name: JOSÉ MIGUEL  QUIN AGUDELO  MRN: [id number]  : 1933  Study Date: 2018 09:54 AM  Age: 85 yrs  Gender: Female  Patient Location: [facility]  Reason For Study: , HFrEF (heart failure with reduced ejection fraction) (H)  Ordering Physician: JORDON GUZMAN  Referring Physician: JORDON GUZMAN  Performed By: Christoph Aden RDCS     BSA: 1.6 m2  Height: 57 in  Weight: 148 lb  BP: 125/50 mmHg  _____________________________________________________________________________  __        Procedure  Echocardiogram with two-dimensional, color and spectral Doppler performed.  _____________________________________________________________________________  __        Interpretation Summary  Limited evaluation given suboptimal image quality.     The left ventricular ejection fraction is visually estimated at 55-60 %. No  regional wall motion abnormalities are seen.  Global right ventricular function is normal.  There is severe left atrial enlargement.  Valves are not well seen, though there appears to be mild aortic  insufficiency.  No pericardial effusion is present.  This study was compared with the study from 3/14/18 . There has been no  change.  _____________________________________________________________________________  __        Left Ventricle  Left ventricular wall thickness cannot evaluate. Left ventricular size cannot  evaluate. The Ejection Fraction was visually estimated. The Ejection Fraction  is estimated at 55-60%. No regional wall motion abnormalities are seen.     Right Ventricle  Global right ventricular function is normal. RV size cannot be assessed.     Atria  Right atrium cannot be assessed. Severe left atrial enlargement is present.     Mitral Valve  The mitral valve cannot be assessed.        Aortic Valve  The aortic valve cannot be assessed. Mild aortic insufficiency is present.     Tricuspid Valve  The tricuspid valve cannot be assessed. Pulmonary artery systolic pressure  cannot be assessed.     Pulmonic Valve  The  pulmonic valve cannot be assessed.     Vessels  The inferior vena cava was normal in size with preserved respiratory  variability. The aorta root cannot be assessed. Estimated mean right atrial  pressure is 3 mmHg (normal).     Pericardium  No pericardial effusion is present.        Compared to Previous Study  This study was compared with the study from 3/14/18 . There has been no  change.     Attestation  I have personally viewed the imaging and agree with the interpretation and  report as documented by the fellow, Aman Comer, and/or edited by me.  _____________________________________________________________________________  __     MMode/2D Measurements & Calculations  LA Volume (BP): 109.0 ml  LA Volume Index (BP): 69.0 ml/m2        Doppler Measurements & Calculations  MV E max tati: 118.0 cm/sec  MV A max tati: 90.3 cm/sec  MV E/A: 1.3  MV dec time: 0.26 sec  Lateral E/e': 15.4        _____________________________________________________________________________  __           Report approved by: Ade Razo 2018 11:37 AM      ECHO LIMITED WITH OPTISON    Narrative    [id number]  [id number]  [id number]  [id number]^FERNANDO^KELLY^R           [facility],[place]  Echocardiography Laboratory  [address] [place] [address]     Name: QUIN VALDEZ  MRN: [id number]  : 1933  Study Date: 2018 01:41 PM  Age: 84 yrs  Gender: Female  Patient Location: [facility]  Reason For Study: Chest Pressure  Ordering Physician: KELLY KING  Performed By: Kade Wolfe RDCS     BSA: 1.6 m2  Height: 57 in  Weight: 150 lb  _____________________________________________________________________________  __        Procedure  Limited Portable Echo Adult. Contrast Optison. Optison (NDC #7412-2531-86)  given intravenously. Patient was given 6 ml mixture of 3 ml Optison and 6 ml  saline. 3 ml wasted.  _____________________________________________________________________________  __         Interpretation Summary  Global and regional left ventricular function is normal with an EF of 60-65%.  No regional wall motion abnormalities are seen.  Right ventricular function, chamber size, wall motion, and thickness are  normal.  No pericardial effusion is present.  _____________________________________________________________________________  __        Left Ventricle  Global and regional left ventricular function is normal with an EF of 60-65%.  No regional wall motion abnormalities are seen.     Right Ventricle  Right ventricular function, chamber size, wall motion, and thickness are  normal.     Pericardium  No pericardial effusion is present.     _____________________________________________________________________________  __                       Report approved by: Ade Razo 2018 02:12 PM                 _____________________________________________________________________________  __      ECHO LIMITED WITH OPTISON    Narrative    [id number]  [id number]  [id number]  [id number]^JONATHAN^ROLANDA^           [facility],[place]  Echocardiography Laboratory  [address] [place] [id number]     Name: QUIN VALDEZ  MRN: [id number]  : 1933  Study Date: 2018 11:12 AM  Age: 84 yrs  Gender: Female  Patient Location[place] [facility]  Reason For Study: Afib  Ordering Physician: ROLANDA CASTILLO  Performed By: Yasmany Esteves RDCS     BSA: 1.7 m2  Height: 57 in  Weight: 176 lb  HR: 60  BP: 135/60 mmHg  _____________________________________________________________________________  __        Procedure  Limited Portable Echo Adult. Contrast Optison. Optison (NDC #3268-1528-21)  given intravenously. Patient was given 4 ml mixture of 3 ml Optison and 6 ml  saline. 5 ml wasted.  _____________________________________________________________________________  __        Interpretation Summary  Left ventricular function is normal.The EF is > 65%.  The inferior vena cava was  normal in size with preserved respiratory  variability.  No pericardial effusion is present.  The left ventricular function has improved.  _____________________________________________________________________________  __        Left Ventricle  Left ventricular function is normal.The EF is > 65%. Left ventricular wall  thickness is normal. Left ventricular size is normal. Diastolic function not  assessed due to atrial fibrillation.     Right Ventricle  The right ventricle cannot be assessed.     Atria  The atria cannot be assessed.     Mitral Valve  The mitral valve cannot be assessed. Mild mitral annular calcification is  present.        Aortic Valve  The aortic valve cannot be assessed.     Tricuspid Valve  The tricuspid valve cannot be assessed.     Pulmonic Valve  The pulmonic valve cannot be assessed.     Vessels  The aorta root cannot be assessed. The thoracic aorta cannot be assessed. The  pulmonary artery cannot be assessed. The inferior vena cava was normal in size  with preserved respiratory variability.     Pericardium  No pericardial effusion is present.        Compared to Previous Study  The left ventricular function has improved.     Attestation  I have personally viewed the imaging and agree with the interpretation and  report as documented by the fellow, bernardino rios, and/or edited by me.  _____________________________________________________________________________  __     MMode/2D Measurements & Calculations  IVSd: 1.0 cm  LVIDd: 4.6 cm  LVIDs: 3.2 cm  LVPWd: 0.91 cm  FS: 29.8 %  EDV(Teich): 96.8 ml  ESV(Teich): 41.6 ml  LV mass(C)d: 154.2 grams  LV mass(C)dI: 90.5 grams/m2  RWT: 0.40           _____________________________________________________________________________  __           Report approved by: Ade Razo 01/17/2018 12:39 PM      ECHO COMPLETE WITH OPTISON    Narrative    [id number]  [id number]  [id number]  [id number]^RETA^RICK^LISANDRA           [facility]  [facility]  Echocardiography Laboratory  [address] [place] [id number]     Name: QUIN VALDEZ  MRN: [id number]  : 1933  Study Date: 2017 09:39 AM  Age: 84 yrs  Gender: Female  Patient Location: [facility]  Reason For Study: Dyspnea  Ordering Physician: RICK MCDUFFIE  Performed By: CECILIA See     BSA: 1.7 m2  Height: 58 in  Weight: 176 lb  BP: 131/85 mmHg  _____________________________________________________________________________  __        Procedure  Echocardiogram with two-dimensional, color and spectral Doppler performed.  Contrast Optison. Optison (NDC #6383-7847-18) given intravenously. Patient was  given 6.0 ml mixture of 3 ml Optison and 6 ml saline. 3.0 ml wasted.  _____________________________________________________________________________  __        Interpretation Summary  Technically difficult study due to poor acoustic windows.     Normal left ventricular size with mild concentric hypertrophy.  Global akinesis with sparing of the basal segments consistent with stress  cardiomyopathy versus multivessel coronary artery disease. Moderately reduced  systolic function. EF 30-35%.  Normal right ventricular size and function.  Severe left atrial enlargement.  No pericardial effusion.     Compared to the prior study 10/31/15 the wall motion abnormalities and LV  dysfunction are new.  _____________________________________________________________________________  __        Left Ventricle  Left ventricular size is normal. Mild concentric wall thickening consistent  with left ventricular hypertrophy is present. Moderately (EF 30-35%) reduced  left ventricular function is present. Global akinesis with sparing of the  basal segments consistent with stress cardiomyopathy versus multivessel  coronary artery disease.     Right Ventricle  The right ventricle is normal size. Global right ventricular function is  normal.     Atria  The right atria appears normal. Severe left atrial  enlargement is present.        Mitral Valve  The mitral valve is normal. Trace mitral insufficiency is present.     Aortic Valve  Moderate aortic valve calcification. Mild aortic insufficiency is present.  Mild aortic stenosis is present.     Tricuspid Valve  The tricuspid valve is normal. Trace tricuspid insufficiency is present. The  peak velocity of the tricuspid regurgitant jet is not obtainable.     Pulmonic Valve  The pulmonic valve is normal. Trace pulmonic insufficiency is present.     Vessels  The aorta root is normal. The inferior vena cava was normal in size with  preserved respiratory variability. Estimated mean right atrial pressure is 3  mmHg.     Pericardium  No pericardial effusion is present.        Attestation  I have personally viewed the imaging and agree with the interpretation and  report as documented by the fellow, Chencho Chanel, and/or edited by me.  _____________________________________________________________________________  __     MMode/2D Measurements & Calculations  IVSd: 1.3 cm  LVIDd: 4.7 cm  LVIDs: 3.4 cm  LVPWd: 1.2 cm  FS: 27.4 %  EDV(Teich): 101.3 ml  ESV(Teich): 47.4 ml  LV mass(C)d: 219.4 grams  LV mass(C)dI: 127.2 grams/m2  Ao root diam: 2.5 cm  LVOT diam: 2.2 cm  LVOT area: 3.8 cm2     EF(MOD-bp): 31.6 %  LA Volume (BP): 88.7 ml  LA Volume Index (BP): 51.6 ml/m2     RWT: 0.51        Doppler Measurements & Calculations  MV E max tati: 127.8 cm/sec  Ao V2 max: 212.3 cm/sec  Ao max P.0 mmHg  Ao V2 mean: 147.3 cm/sec  Ao mean PG: 10.0 mmHg  Ao V2 VTI: 40.1 cm  PAUL(I,D): 1.7 cm2  PAUL(V,D): 1.7 cm2  LV V1 max PG: 3.6 mmHg  LV V1 max: 95.3 cm/sec  LV V1 VTI: 18.2 cm  SV(LVOT): 69.2 ml  SI(LVOT): 40.1 ml/m2  PAUL Index (cm2/m2): 1.0  E/E' av.7  Lateral E/e': 25.6  Medial E/e': 23.8        _____________________________________________________________________________  __        Report approved by: Ade Razo 2017 10:42 AM            Assessment and Plan: 85 year  old female with a past medical history including HTN, multinodular goiter, GERD, hyperthyroidism, history of atrial fibrillation with RVR, and recent stress cardiomyopathy with recovered EF. Presents to clinic for CORE follow-up.  No evidence of any atrial fibrillation during a two-week monitoring period. Patient had a Ziopatch In June which showed she did report one episode of symptoms that corresponded to sinus rhythm.  She also had runs of very short supraventricular tachycardia, that were asymptomatic.     Patient states that she is doing well except for hypothyroidism at this time.  Given that she has had no recurrences of atrial fibrillation and is well compensated from a cardiac standpoint, she does not need additional interventions at this time.  Of note, we discussed anticoagulation extensively with her and answered all questions and concerns that she had.  At this time, she declines anticoagulation because of her impending nephrectomy as she has been diagnosed recently with renal cell carcinoma stage I.  After her nephrectomy and issues with her renal cancer resolved, she stated that she would readdress her anticoagulation.  We stated that she has many options for this, including Coumadin or any of the newer anticoagulants that do not require INR monitoring.  She stated that she understood and answered questions related to the anticoagulation.    At this point, she can follow-up with her primary medicine doctor and does not need to see electrophysiology on a scheduled basis.  We told her that we would be happy to see her on an elective basis if she ever has any questions or concerns related to cardiovascular arrhythmias.      Thank you for allowing me to care for this patient. This patient was seen and discussed with Dr. Butch Griffith, who agrees with the plan above. If you have any questions, please do not hesitate to contact me.    Thank you,    Augusta Hubbard  Cardiology Fellow, PGY-5     I very much  appreciated the opportunity to see and assess Mrs Dimple Oviedo in the clinic with CV Fellow Dr Hubbard.  The patient;'s sister-in-law was also present. We discuissed pros ansd cons of ACs in detail. Given renal issue, she prefers to defer any decision. Her FP clinic may wish to follow this up after renal issue is sorted     We will be happy to se her again in the future should that be desired.    Please do not hesitate to contact my office if you have any questions or concerns.      Butch Griffith MD  Cardiac Arrhythmia Service  [facility]  [phone]    CC  BUTCH GRIFFITH

## 2018-09-27 NOTE — PROGRESS NOTES
HPI: 85 year old female with a past medical history including HTN, multinodular goiter, GERD, hyperthyroidism, history of atrial fibrillation with RVR, and recent stress cardiomyopathy with recovered EF. Presents to clinic for CORE follow-up. She presented to [facility] 12/13 with persistent chest pain and shortness of breath, and was found to have an elevated troponin. EKG negative for ischemic changes. Chest CT negative for PE, but showed bilateral pleural effusions. Echo showed normal LV size with mild LVH, EF 30-35%, and global akinesis with sparing of the basal segments. Coronary angiogram revealed no obstructive CAD.  Findings all suggested stress-induced cardiomyopathy, so she was discharged on BB, ARB, statin, and aspirin. She was then admitted overnight on 1/16 for palpitations and was found to be in afib with RVR in the setting of volume overload and ongoing hyperthyroidism (though free t4 was normal that admission). She converted to sinus spontaneously. She was given a one-time dose of xarelto and developed significant hematuria so this was d/c'ed.  She is now s/p bladder mass bx which were negative for malignancy. An echo during that admission showed a normalized EF. In May she had a ziopatch that showed some SVTs. Most recently she presented to ED 7/16 with shortness of breath. She was treated with Zpack and albuterol for bronchitis.     No evidence of any atrial fibrillation during a two-week monitoring period. Patient had a Ziopatch In June which showed she did report one episode of symptoms that corresponded to sinus rhythm.  She also had runs of very short supraventricular tachycardia, that were asymptomatic.  Of note, she states that she has hyperthyroidism for several years and that her hyperparathyroidism has been getting worse over the last several months.      PAST MEDICAL HISTORY:  Past Medical History:   Diagnosis Date     Calculus of kidney      Esophageal reflux      GERD (gastroesophageal reflux  disease)      Hyperlipidemia LDL goal <130 5/9/2010     Malignant melanoma of skin of trunk, except scrotum (H)      Nonspecific abnormal finding     has living will 2004 -      Nontoxic multinodular goiter     no further eval /tx rec per pt     Osteopenia      Other psoriasis      Personal history of colonic polyps      PMR (polymyalgia rheumatica) (H)      Stress-induced cardiomyopathy      Undiagnosed cardiac murmurs      Unspecified constipation      Unspecified essential hypertension        CURRENT MEDICATIONS:  Current Outpatient Prescriptions   Medication Sig Dispense Refill     acetaminophen (TYLENOL) 650 MG CR tablet Take 650 mg by mouth as needed       alendronate (FOSAMAX) 70 MG tablet TAKE 1 TABLET EVERY 7 DAYS AT LEAST 60 MINUTES BEFORE BREAKFAST AS DIRECTED. SEE PACKAGE FOR ADDITIONAL INSTRUCTIONS 12 tablet 0     aspirin 81 MG tablet Take 81 mg by mouth daily       Calcium Citrate-Vitamin D (CALCIUM + D PO) Take 1 tablet by mouth 2 times daily        colestipol (COLESTID) 1 g tablet Take 1 g by mouth 2 times daily TAKE OTHER MEDS 1 HOUR BEFORE OR 4 HOURS AFTER COLESID       cycloSPORINE (RESTASIS) 0.05 % ophthalmic emulsion Place 1 drop into both eyes 2 times daily       diazepam (VALIUM) 2 MG tablet        ferrous sulfate 325 (65 Fe) MG TBEC EC tablet Take 325 mg by mouth daily       furosemide (LASIX) 20 MG tablet Take 1 tablet (20 mg) by mouth every morning 90 tablet 1     irbesartan (AVAPRO) 300 MG tablet TAKE 1 TABLET EVERY DAY (Patient taking differently: Take 300 mg by mouth every morning TAKE 1 TABLET EVERY DAY) 90 tablet 2     lovastatin (MEVACOR) 40 MG tablet TAKE 1 TABLET AT BEDTIME (HYPERLIPIDEMIA LDL GOAL BELOW 130) 90 tablet 2     Melatonin 10 MG TABS tablet Take 10 mg by mouth as needed for sleep        methimazole (TAPAZOLE) 5 MG tablet Take 1 tablet (5 mg) by mouth daily (Patient taking differently: Take 5 mg by mouth every morning ) 90 tablet 1     nitroGLYcerin (NITROSTAT) 0.4 MG  sublingual tablet For chest pain place 1 tablet under the tongue every 5 minutes for 3 doses. If symptoms persist 5 minutes after 1st dose call 911. 25 tablet 3     Omega-3 Fatty Acids (FISH OIL) 500 MG CAPS Take 1 capsule by mouth 2 times daily        omeprazole (PRILOSEC) 20 MG CR capsule Take 1 capsule (20 mg) by mouth daily (Patient taking differently: Take 20 mg by mouth every morning ) 180 capsule 3     order for DME Equipment being ordered: Knee high compression for B LE 20-30mmHg, Velcro units for night time (consider hybrid sock as foot swelling is less than leg swelling), Donning device 1 each 2     polyethylene glycol 0.4%- propylene glycol 0.3% (SYSTANE) 0.4-0.3 % SOLN ophthalmic solution Place 1 drop into both eyes 2 times daily as needed for dry eyes        Probiotic Product (PROBIOTIC ADVANCED PO) Take 1 capsule by mouth every morning        propranolol (INDERAL LA) 60 MG 24 hr capsule Take 1 capsule (60 mg) by mouth daily (Patient taking differently: Take 60 mg by mouth every morning ) 90 capsule 1     rOPINIRole (REQUIP) 0.25 MG tablet TAKE 1 TABLET(0.25 MG) BY MOUTH EVERY NIGHT AS NEEDED 90 tablet 1     senna-docusate (SENOKOT-S;PERICOLACE) 8.6-50 MG per tablet Take 1-2 tablets by mouth 2 times daily To prevent constipation 45 tablet 0     sertraline (ZOLOFT) 100 MG tablet Take 1 tablet (100 mg) by mouth every morning 90 tablet 1     traZODone (DESYREL) 50 MG tablet TAKE 1/2 TABLET(25 MG)  AT BEDTIME 45 tablet 1       PAST SURGICAL HISTORY:  Past Surgical History:   Procedure Laterality Date     BIOPSY       C NONSPECIFIC PROCEDURE  2005    colonoscopy polyp repeat 2010     COLONOSCOPY  2014     COMBINED CYSTOSCOPY, INSERT STENT URETER(S) Bilateral 9/12/2018    Procedure: COMBINED CYSTOSCOPY, INSERT STENT URETER(S);  Cystoscopy Bilateral ureteral Stent Placement.;  Surgeon: Justin Henry MD;  Location: [facility] OR     COMBINED CYSTOSCOPY, RETROGRADES, URETEROSCOPY, INSERT STENT Bilateral 4/3/2018     Procedure: COMBINED CYSTOSCOPY, RETROGRADES, URETEROSCOPY, INSERT STENT;;  Surgeon: Stuart King MD;  Location: [facility] OR     COMBINED CYSTOSCOPY, RETROGRADES, URETEROSCOPY, INSERT STENT Bilateral 9/10/2018    Procedure: COMBINED CYSTOSCOPY, RETROGRADES, URETEROSCOPY, INSERT STENT;  Cystoscopy, Bilateral Ureteroscopy, Bladder Biopsies, Retrogram Pyelograms, Ureteral Washings and brushings, cysview;  Surgeon: Stuart King MD;  Location[facility] OR     CYSTOSCOPY, BIOPSY BLADDER INSTILL OPTICAL AGENT N/A 4/3/2018    Procedure: CYSTOSCOPY, BIOPSY BLADDER INSTILL OPTICAL AGENT;  Cystoscopy, Blue Light Cystoscopy, Bladder Biopsies, Bilateral Selective ureteral washings for Cytology, Bilateral Retrograde Pyelograms, Bilateral Ureteroscopy;  Surgeon: Stuart King MD;  Location: [facility] OR     CYSTOSCOPY, BIOPSY BLADDER, COMBINED N/A 2/19/2018    Procedure: COMBINED CYSTOSCOPY, BIOPSY BLADDER;  Cystoscopy, Bladder Biopsy;  Surgeon: Kenna La MD;  Location: [facility] OR     ENDOSCOPIC ULTRASOUND LOWER GASTROINTESTIONAL TRACT (GI) N/A 10/30/2015    Procedure: ENDOSCOPIC ULTRASOUND LOWER GASTROINTESTIONAL TRACT (GI);  Surgeon: Daniel Jean-Baptiste MD;  Location: [facility] OR     EYE SURGERY  12/4/17     LAPAROSCOPIC CHOLECYSTECTOMY WITH CHOLANGIOGRAMS N/A 11/1/2015    Procedure: LAPAROSCOPIC CHOLECYSTECTOMY WITH CHOLANGIOGRAMS;  Surgeon: Tonie Warren MD;  Location: [facility] OR     SURGICAL HISTORY OF -   1996    malignant melanoma     SURGICAL HISTORY OF -   1968    thyroid nodule     SURGICAL HISTORY OF -       D & C       ALLERGIES:     Allergies   Allergen Reactions     No Known Drug Allergies        FAMILY HISTORY:  Family History   Problem Relation Age of Onset     Cancer Father      dec - esophageal and laryngeal     HEART DISEASE Mother      Respiratory Mother      dec     Breast Cancer Daughter      Other Cancer Daughter      Thyroid Disease Daughter      Asthma Daughter       "Hyperlipidemia Son      Diabetes Son      - Premature coronary artery disease  - Atrial fibrillation  - Sudden cardiac death     SOCIAL HISTORY:  Social History   Substance Use Topics     Smoking status: Never Smoker     Smokeless tobacco: Never Used     Alcohol use No      Comment: 6 times per year-one drink       ROS:   Constitutional: No fever, chills, or sweats. Weight stable.   ENT: No visual disturbance, ear ache, epistaxis, sore throat.   Cardiovascular: As per HPI.   Respiratory: No cough, hemoptysis.    GI: No nausea, vomiting, hematemesis, melena, or hematochezia.   : No hematuria.   Integument: Negative.   Psychiatric: Negative.   Hematologic:  Easy bruising, no easy bleeding.  Neuro: Negative.   Endocrinology: No significant heat or cold intolerance   Musculoskeletal: No myalgia.    Exam:  /67 (BP Location: Left arm, Patient Position: Chair, Cuff Size: Adult Regular)  Pulse 76  Ht 1.448 m (4' 9\")  Wt 64 kg (141 lb)  SpO2 97%  BMI 30.51 kg/m2  GENERAL APPEARANCE: healthy, alert and no distress  HEENT: no icterus, no xanthelasmas, normal pupil size and reaction, normal palate, mucosa moist, no central cyanosis  NECK: no adenopathy, no asymmetry, masses, or scars, thyroid normal to palpation and no bruits, JVP not elevated  RESPIRATORY: lungs clear to auscultation - no rales, rhonchi or wheezes, no use of accessory muscles, no retractions, respirations are unlabored, normal respiratory rate  CARDIOVASCULAR: regular rhythm, normal S1 with physiologic split S2, no S3 or S4 and no murmur, click or rub, precordium quiet with normal PMI.  ABDOMEN: soft, non tender, without hepatosplenomegaly, no masses palpable, bowel sounds normal, aorta not enlarged by palpation, no abdominal bruits  EXTREMITIES: peripheral pulses normal, no edema, no bruits  NEURO: alert and oriented to person/place/time, normal speech, gait and affect  VASC: Radial, femoral, dorsalis pedis and posterior tibialis pulses are " normal in volumes and symmetric bilaterally. No bruits are heard.  SKIN: no ecchymoses, no rashes    Labs:  CBC RESULTS:   Lab Results   Component Value Date    WBC 7.5 2018    RBC 3.31 (L) 2018    HGB 9.1 (L) 2018    HCT 29.3 (L) 2018    MCV 89 2018    MCH 27.5 2018    MCHC 31.1 (L) 2018    RDW 15.0 2018     2018       BMP RESULTS:  Lab Results   Component Value Date     2018    POTASSIUM 4.4 2018    CHLORIDE 103 2018    CO2 28 2018    ANIONGAP 7 2018     (H) 2018    BUN 17 2018    CR 0.70 2018    GFRESTIMATED 80 2018    GFRESTBLACK >90 2018    SHREYA 8.9 2018        INR RESULTS:  Lab Results   Component Value Date    INR 1.71 (H) 2018    INR 1.02 2017    INR 0.95 2015    INR 1.00 10/30/2015       Procedures:  PULMONARY FUNCTION TESTS:   No flowsheet data found.      ECHOCARDIOGRAM:   Recent Results (from the past 8760 hour(s))   Echocardiogram Complete    Narrative    [id number]  [id number]  [id number]  [id number]^LEONEL^VLADIMIR^[facility],[place]  Echocardiography Laboratory  [address] [place] [id number]     Name: QUIN VALDEZ  MRN: [id number]  : 1933  Study Date: 2018 08:45 AM  Age: 85 yrs  Gender: Female  Patient Location: [facility]  Reason For Study: SOB  Ordering Physician: VLADIMIR CASTANEDA  Performed By: ANIBAL Phelps     BSA: 1.6 m2  Height: 57 in  Weight: 146 lb  BP: 152/51 mmHg  _____________________________________________________________________________  __        Procedure  Complete Portable Echo Adult.  _____________________________________________________________________________  __        Interpretation Summary  Left ventricular function, chamber size, wall motion, and wall thickness are  normal.The EF is 55-60%.  Right ventricular function, chamber size, wall motion, and thickness  are  normal.  Severe left atrial enlargement is present.  Aortic valve poorly visualized but appears to have moderate calcification and  at least mild-moderate stenosis.  Mild to moderate aortic insufficiency is present.  IVC measures 2.47cm and collapses with inspiration. Estimated mean right  atrial pressure is 8 mmHg (mildly elevated).  No pericardial effusion is present.     _____________________________________________________________________________  __        Left Ventricle  Left ventricular function, chamber size, wall motion, and wall thickness are  normal.The EF is 55-60%. Left ventricular size is normal. Relative wall  thickness is increased consistent with concentric remodeling. Grade II or  moderate diastolic dysfunction. No regional wall motion abnormalities are  seen.     Right Ventricle  Right ventricular function, chamber size, wall motion, and thickness are  normal.     Atria  Severe left atrial enlargement is present. Mild right atrial enlargement is  present.        Mitral Valve  The mitral valve is normal. Trace mitral insufficiency is present.     Aortic Valve  Moderate to severe aortic valve sclerosis is present. Mild to moderate aortic  insufficiency is present. Mild to moderate aortic stenosis is present. The  peak aortic velocity is 2.8 m/sec. The V2/V1 ratio is .43.     Tricuspid Valve  The tricuspid valve is normal. Moderate tricuspid insufficiency is present.  Right ventricular systolic pressure is 43mmHg above the right atrial pressure.  Suggests mild-moderate pulmonary hypertension.     Pulmonic Valve  The pulmonic valve is normal.     Vessels  IVC measures 2.47cm and collapses with inspiration.Aortic root and proximal  ascending aorta are normal in size. Dilation of the inferior vena cava is  present with normal respiratory variation in diameter. Estimated mean right  atrial pressure is 8 mmHg (mildly elevated).     Pericardium  No pericardial effusion is present.        Compared to  Previous Study  compared to the previous study done on 18 there has been no significant  change.     Attestation  I have personally viewed the imaging and agree with the interpretation and  report as documented by the fellow, Tana Martinez, and/or edited by me.  _____________________________________________________________________________  __     MMode/2D Measurements & Calculations  IVSd: 0.85 cm  LVIDd: 4.3 cm  LVIDs: 3.0 cm  LVPWd: 1.1 cm  FS: 30.2 %  LV mass(C)d: 139.4 grams  LV mass(C)dI: 88.6 grams/m2  LVOT diam: 2.0 cm  LVOT area: 3.1 cm2  LA Volume (BP): 86.1 ml     LA Volume Index (BP): 54.8 ml/m2  RWT: 0.52        Doppler Measurements & Calculations  MV E max rick: 136.0 cm/sec  MV A max rick: 105.0 cm/sec  MV E/A: 1.3  MV dec time: 0.19 sec  Ao V2 max: 279.0 cm/sec  Ao max P.1 mmHg  Ao V2 mean: 206.0 cm/sec  Ao mean P.0 mmHg  Ao V2 VTI: 66.1 cm  PAUL(I,D): 1.1 cm2  PAUL(V,D): 1.2 cm2  AI P1/2t: 340.6 msec  LV V1 max P.8 mmHg  LV V1 max: 109.0 cm/sec  LV V1 VTI: 23.3 cm  SV(LVOT): 73.2 ml  SI(LVOT): 46.5 ml/m2  TR max rick: 327.0 cm/sec  TR max P.8 mmHg  AV Rick Ratio (DI): 0.39  PAUL Index (cm2/m2): 0.70  E/E' av.0  Lateral E/e': 19.3  Medial E/e': 26.6        _____________________________________________________________________________  __           Report approved by: Ade Tierney 2018 12:01 PM      Echocardiogram Complete    Narrative    [id number]  ECH19  [id number]  [id number]^THOMAS^JORDON^T           [facility],[place]  Echocardiography Laboratory  [address] [place] [id number]     Name: QUIN VALDEZ  MRN: [id number]  : 1933  Study Date: 2018 09:54 AM  Age: 85 yrs  Gender: Female  Patient [facility]: [facility]  Reason For Study: , HFrEF (heart failure with reduced ejection fraction) (H)  Ordering Physician: JORDON GUZMAN  Referring Physician: JORDON GUZMAN  Performed By: Christoph Aden RDCS     BSA: 1.6  m2  Height: 57 in  Weight: 148 lb  BP: 125/50 mmHg  _____________________________________________________________________________  __        Procedure  Echocardiogram with two-dimensional, color and spectral Doppler performed.  _____________________________________________________________________________  __        Interpretation Summary  Limited evaluation given suboptimal image quality.     The left ventricular ejection fraction is visually estimated at 55-60 %. No  regional wall motion abnormalities are seen.  Global right ventricular function is normal.  There is severe left atrial enlargement.  Valves are not well seen, though there appears to be mild aortic  insufficiency.  No pericardial effusion is present.  This study was compared with the study from 3/14/18 . There has been no  change.  _____________________________________________________________________________  __        Left Ventricle  Left ventricular wall thickness cannot evaluate. Left ventricular size cannot  evaluate. The Ejection Fraction was visually estimated. The Ejection Fraction  is estimated at 55-60%. No regional wall motion abnormalities are seen.     Right Ventricle  Global right ventricular function is normal. RV size cannot be assessed.     Atria  Right atrium cannot be assessed. Severe left atrial enlargement is present.     Mitral Valve  The mitral valve cannot be assessed.        Aortic Valve  The aortic valve cannot be assessed. Mild aortic insufficiency is present.     Tricuspid Valve  The tricuspid valve cannot be assessed. Pulmonary artery systolic pressure  cannot be assessed.     Pulmonic Valve  The pulmonic valve cannot be assessed.     Vessels  The inferior vena cava was normal in size with preserved respiratory  variability. The aorta root cannot be assessed. Estimated mean right atrial  pressure is 3 mmHg (normal).     Pericardium  No pericardial effusion is present.        Compared to Previous Study  This study was  compared with the study from 3/14/18 . There has been no  change.     Attestation  I have personally viewed the imaging and agree with the interpretation and  report as documented by the fellow, Aman Comer, and/or edited by me.  _____________________________________________________________________________  __     MMode/2D Measurements & Calculations  LA Volume (BP): 109.0 ml  LA Volume Index (BP): 69.0 ml/m2        Doppler Measurements & Calculations  MV E max tati: 118.0 cm/sec  MV A max tati: 90.3 cm/sec  MV E/A: 1.3  MV dec time: 0.26 sec  Lateral E/e': 15.4        _____________________________________________________________________________  __           Report approved by: Ade Razo 2018 11:37 AM      ECHO LIMITED WITH OPTISON    Narrative    [id number]  ECH74  [id number]  [id number]^FERNANDO^KELLY^R           [facility],[place]  Echocardiography Laboratory  [address] [place] [address]     Name: QUIN VALDEZ  MRN: [id number]  : 1933  Study Date: 2018 01:41 PM  Age: 84 yrs  Gender: Female  Patient Location: [facility]  Reason For Study: Chest Pressure  Ordering Physician: KELLY KING  Performed By: Kade Wolfe RDCS     BSA: 1.6 m2  Height: 57 in  Weight: 150 lb  _____________________________________________________________________________  __        Procedure  Limited Portable Echo Adult. Contrast Optison. Optison (NDC #3965-1593-14)  given intravenously. Patient was given 6 ml mixture of 3 ml Optison and 6 ml  saline. 3 ml wasted.  _____________________________________________________________________________  __        Interpretation Summary  Global and regional left ventricular function is normal with an EF of 60-65%.  No regional wall motion abnormalities are seen.  Right ventricular function, chamber size, wall motion, and thickness are  normal.  No pericardial effusion is  present.  _____________________________________________________________________________  __        Left Ventricle  Global and regional left ventricular function is normal with an EF of 60-65%.  No regional wall motion abnormalities are seen.     Right Ventricle  Right ventricular function, chamber size, wall motion, and thickness are  normal.     Pericardium  No pericardial effusion is present.     _____________________________________________________________________________  __                       Report approved by: Ade Razo 2018 02:12 PM                 _____________________________________________________________________________  __      ECHO LIMITED WITH OPTISON    Narrative    [id number]  ECH74  [id number]  [id number]^JONATHAN^ROLANDA^           [facility],[place]  Echocardiography Laboratory  [address] [place] [id number]     Name: QUIN VALDEZ  MRN: [id number]  : 1933  Study Date: 2018 11:12 AM  Age: 84 yrs  Gender: Female  Patient Location: [facility]  Reason For Study: Afib  Ordering Physician: ROLANDA CASTILLO  Performed By: Yasmany Esteves RDCS     BSA: 1.7 m2  Height: 57 in  Weight: 176 lb  HR: 60  BP: 135/60 mmHg  _____________________________________________________________________________  __        Procedure  Limited Portable Echo Adult. Contrast Optison. Optison (NDC #9101-7869-17)  given intravenously. Patient was given 4 ml mixture of 3 ml Optison and 6 ml  saline. 5 ml wasted.  _____________________________________________________________________________  __        Interpretation Summary  Left ventricular function is normal.The EF is > 65%.  The inferior vena cava was normal in size with preserved respiratory  variability.  No pericardial effusion is present.  The left ventricular function has improved.  _____________________________________________________________________________  __        Left Ventricle  Left ventricular function  is normal.The EF is > 65%. Left ventricular wall  thickness is normal. Left ventricular size is normal. Diastolic function not  assessed due to atrial fibrillation.     Right Ventricle  The right ventricle cannot be assessed.     Atria  The atria cannot be assessed.     Mitral Valve  The mitral valve cannot be assessed. Mild mitral annular calcification is  present.        Aortic Valve  The aortic valve cannot be assessed.     Tricuspid Valve  The tricuspid valve cannot be assessed.     Pulmonic Valve  The pulmonic valve cannot be assessed.     Vessels  The aorta root cannot be assessed. The thoracic aorta cannot be assessed. The  pulmonary artery cannot be assessed. The inferior vena cava was normal in size  with preserved respiratory variability.     Pericardium  No pericardial effusion is present.        Compared to Previous Study  The left ventricular function has improved.     Attestation  I have personally viewed the imaging and agree with the interpretation and  report as documented by the fellow, bernardino rios, and/or edited by me.  _____________________________________________________________________________  __     MMode/2D Measurements & Calculations  IVSd: 1.0 cm  LVIDd: 4.6 cm  LVIDs: 3.2 cm  LVPWd: 0.91 cm  FS: 29.8 %  EDV(Teich): 96.8 ml  ESV(Teich): 41.6 ml  LV mass(C)d: 154.2 grams  LV mass(C)dI: 90.5 grams/m2  RWT: 0.40           _____________________________________________________________________________  __           Report approved by: Ade Razo 2018 12:39 PM      ECHO COMPLETE WITH OPTISON    Narrative    [id number]  [id number]  [id number]  [id number]^RETA^RICK^LISANDRA           [facility],[place]  Echocardiography Laboratory  [address] [place] [id number]     Name: QUIN VALDEZ  MRN: [id number]  : 1933  Study Date: 2017 09:39 AM  Age: 84 yrs  Gender: Female  Patient Location: [facility]  Reason For Study: Dyspnea  Ordering Physician: RETA  RICK  Performed By: [facility] Destinee [place]OR[place] Zelayaon     BSA: 1.7 m2  Height: 58 in  Weight: 176 lb  BP: 131/85 mmHg  _____________________________________________________________________________  __        Procedure  Echocardiogram with two-dimensional, color and spectral Doppler performed.  Contrast Optison. Optison (NDC #7397-9724-42) given intravenously. Patient was  given 6.0 ml mixture of 3 ml Optison and 6 ml saline. 3.0 ml wasted.  _____________________________________________________________________________  __        Interpretation Summary  Technically difficult study due to poor acoustic windows.     Normal left ventricular size with mild concentric hypertrophy.  Global akinesis with sparing of the basal segments consistent with stress  cardiomyopathy versus multivessel coronary artery disease. Moderately reduced  systolic function. EF 30-35%.  Normal right ventricular size and function.  Severe left atrial enlargement.  No pericardial effusion.     Compared to the prior study 10/31/15 the wall motion abnormalities and LV  dysfunction are new.  _____________________________________________________________________________  __        Left Ventricle  Left ventricular size is normal. Mild concentric wall thickening consistent  with left ventricular hypertrophy is present. Moderately (EF 30-35%) reduced  left ventricular function is present. Global akinesis with sparing of the  basal segments consistent with stress cardiomyopathy versus multivessel  coronary artery disease.     Right Ventricle  The right ventricle is normal size. Global right ventricular function is  normal.     Atria  The right atria appears normal. Severe left atrial enlargement is present.        Mitral Valve  The mitral valve is normal. Trace mitral insufficiency is present.     Aortic Valve  Moderate aortic valve calcification. Mild aortic insufficiency is present.  Mild aortic stenosis is present.     Tricuspid Valve  The tricuspid valve is  normal. Trace tricuspid insufficiency is present. The  peak velocity of the tricuspid regurgitant jet is not obtainable.     Pulmonic Valve  The pulmonic valve is normal. Trace pulmonic insufficiency is present.     Vessels  The aorta root is normal. The inferior vena cava was normal in size with  preserved respiratory variability. Estimated mean right atrial pressure is 3  mmHg.     Pericardium  No pericardial effusion is present.        Attestation  I have personally viewed the imaging and agree with the interpretation and  report as documented by the fellow, Chencho Chanel, and/or edited by me.  _____________________________________________________________________________  __     MMode/2D Measurements & Calculations  IVSd: 1.3 cm  LVIDd: 4.7 cm  LVIDs: 3.4 cm  LVPWd: 1.2 cm  FS: 27.4 %  EDV(Teich): 101.3 ml  ESV(Teich): 47.4 ml  LV mass(C)d: 219.4 grams  LV mass(C)dI: 127.2 grams/m2  Ao root diam: 2.5 cm  LVOT diam: 2.2 cm  LVOT area: 3.8 cm2     EF(MOD-bp): 31.6 %  LA Volume (BP): 88.7 ml  LA Volume Index (BP): 51.6 ml/m2     RWT: 0.51        Doppler Measurements & Calculations  MV E max tati: 127.8 cm/sec  Ao V2 max: 212.3 cm/sec  Ao max P.0 mmHg  Ao V2 mean: 147.3 cm/sec  Ao mean PG: 10.0 mmHg  Ao V2 VTI: 40.1 cm  PAUL(I,D): 1.7 cm2  PAUL(V,D): 1.7 cm2  LV V1 max PG: 3.6 mmHg  LV V1 max: 95.3 cm/sec  LV V1 VTI: 18.2 cm  SV(LVOT): 69.2 ml  SI(LVOT): 40.1 ml/m2  PAUL Index (cm2/m2): 1.0  E/E' av.7  Lateral E/e': 25.6  Medial E/e': 23.8        _____________________________________________________________________________  __        Report approved by: Ade Razo 2017 10:42 AM            Assessment and Plan: 85 year old female with a past medical history including HTN, multinodular goiter, GERD, hyperthyroidism, history of atrial fibrillation with RVR, and recent stress cardiomyopathy with recovered EF. Presents to clinic for CORE follow-up.  No evidence of any atrial fibrillation during a  two-week monitoring period. Patient had a Ziopatch In June which showed she did report one episode of symptoms that corresponded to sinus rhythm.  She also had runs of very short supraventricular tachycardia, that were asymptomatic.     Patient states that she is doing well except for hypothyroidism at this time.  Given that she has had no recurrences of atrial fibrillation and is well compensated from a cardiac standpoint, she does not need additional interventions at this time.  Of note, we discussed anticoagulation extensively with her and answered all questions and concerns that she had.  At this time, she declines anticoagulation because of her impending nephrectomy as she has been diagnosed recently with renal cell carcinoma stage I.  After her nephrectomy and issues with her renal cancer resolved, she stated that she would readdress her anticoagulation.  We stated that she has many options for this, including Coumadin or any of the newer anticoagulants that do not require INR monitoring.  She stated that she understood and answered questions related to the anticoagulation.    At this point, she can follow-up with her primary medicine doctor and does not need to see electrophysiology on a scheduled basis.  We told her that we would be happy to see her on an elective basis if she ever has any questions or concerns related to cardiovascular arrhythmias.      Thank you for allowing me to care for this patient. This patient was seen and discussed with Dr. Butch Griffith, who agrees with the plan above. If you have any questions, please do not hesitate to contact me.    Thank you,    Augusta Hubbard  Cardiology Fellow, PGY-5     I very much appreciated the opportunity to see and assess Mrs Dimple Oviedo in the clinic with CV Fellow Dr Hubbard.  The patient;'s sister-in-law was also present. We discuissed pros ansd cons of ACs in detail. Given renal issue, she prefers to defer any decision. Her FP clinic may wish to follow  this up after renal issue is sorted     We will be happy to se her again in the future should that be desired.    Please do not hesitate to contact my office if you have any questions or concerns.      Butch Griffith MD  Cardiac Arrhythmia Service  [facility]  [phone]      BUTCH GRIFFITH  Answers for HPI/ROS submitted by the patient on 9/24/2018   General Symptoms: Yes  Skin Symptoms: No  HENT Symptoms: Yes  EYE SYMPTOMS: No  HEART SYMPTOMS: Yes  LUNG SYMPTOMS: No  INTESTINAL SYMPTOMS: No  URINARY SYMPTOMS: Yes  GYNECOLOGIC SYMPTOMS: No  BREAST SYMPTOMS: No  SKELETAL SYMPTOMS: Yes  BLOOD SYMPTOMS: Yes  NERVOUS SYSTEM SYMPTOMS: Yes  MENTAL HEALTH SYMPTOMS: No  Fever: No  Loss of appetite: No  Weight loss: Yes  Weight gain: No  Fatigue: Yes  Night sweats: No  Chills: No  Increased stress: Yes  Excessive hunger: Yes  Excessive thirst: No  Feeling hot or cold when others believe the temperature is normal: Yes  Loss of height: No  Post-operative complications: No  Surgical site pain: No  Hallucinations: No  Change in or Loss of Energy: Yes  Hyperactivity: Yes  Confusion: No  Ear pain: No  Ear discharge: No  Hearing loss: No  Tinnitus: No  Nosebleeds: Yes  Congestion: No  Sinus pain: No  Trouble swallowing: No   Voice hoarseness: Yes  Mouth sores: No  Sore throat: No  Tooth pain: No  Gum tenderness: No  Bleeding gums: No  Change in taste: No  Change in sense of smell: No  Dry mouth: No  Hearing aid used: Yes  Neck lump: No  Chest pain or pressure: No  Fast or irregular heartbeat: No  Pain in legs with walking: No  Trouble breathing while lying down: No  Fingers or toes appear blue: No  High blood pressure: Yes  Low blood pressure: No  Fainting: No  Murmurs: No  Pacemaker: No  Varicose veins: No  Edema or swelling: Yes  Wake up at night with shortness of breath: No  Light-headedness: No  Exercise intolerance: No  Trouble holding urine or incontinence: Yes  Pain or burning: No  Trouble starting or  stopping: Yes  Increased frequency of urination: Yes  Blood in urine: Yes  Decreased frequency of urination: No  Frequent nighttime urination: Yes  Flank pain: No  Difficulty emptying bladder: No  Back pain: No  Muscle aches: No  Neck pain: No  Swollen joints: No  Joint pain: No  Bone pain: No  Muscle cramps: No  Muscle weakness: Yes  Joint stiffness: No  Bone fracture: No  Anemia: No  Swollen glands: No  Easy bleeding or bruising: No  Trouble with coordination: No  Dizziness or trouble with balance: No  Fainting or black-out spells: No  Memory loss: No  Headache: No  Seizures: No  Speech problems: No  Tingling: No  Tremor: Yes  Weakness: Yes  Difficulty walking: Yes  Paralysis: No  Numbness: No

## 2018-09-28 DIAGNOSIS — G25.81 RESTLESS LEGS SYNDROME: ICD-10-CM

## 2018-09-28 LAB — INTERPRETATION ECG - MUSE: NORMAL

## 2018-09-28 NOTE — TELEPHONE ENCOUNTER
"Requested Prescriptions   Pending Prescriptions Disp Refills     rOPINIRole (REQUIP) 0.25 MG tablet  Last Written Prescription Date:  9/26/2018  Last Fill Quantity: 90 tabs,  # refills: 1   Last office visit: 9/19/2018 with prescribing provider:  Curry   Future Office Visit:     90 tablet 1     Sig: TAKE 1 TABLET(0.25 MG) BY MOUTH EVERY NIGHT AS NEEDED    Antiparkinson's Agents Protocol Passed    9/28/2018  2:30 PM       Passed - Blood pressure under 140/90 in past 12 months    BP Readings from Last 3 Encounters:   09/27/18 116/67   09/20/18 136/63   09/19/18 132/68                Passed - CBC on record in past 12 months    Recent Labs   Lab Test  09/14/18   0744   WBC  7.5   RBC  3.31*   HGB  9.1*   HCT  29.3*   PLT  207       For [facility] ONLY: [id number] = WBC, [id number] = RBC         Passed - ALT on record in past 12 months        Recent Labs   Lab Test  09/11/18   0612   ALT  92*            Passed - Serum Creatinine on file in past 12 months    Recent Labs   Lab Test  09/19/18   1512   CR  0.70            Passed - Patient is age 18 or older       Passed - No active pregnancy on record       Passed - No positive pregnancy test in the past 12 months       Passed - Recent (6 mo) or future (30 days) visit within the authorizing provider's specialty    Patient had office visit in the last 6 months or has a visit in the next 30 days with authorizing provider or within the authorizing provider's specialty.  See \"Patient Info\" tab in inbasket, or \"Choose Columns\" in Meds & Orders section of the refill encounter.              "

## 2018-10-01 RX ORDER — ROPINIROLE 0.25 MG/1
TABLET, FILM COATED ORAL
Qty: 90 TABLET | Refills: 1 | OUTPATIENT
Start: 2018-10-01

## 2018-10-02 DIAGNOSIS — E05.00 GRAVES DISEASE: Primary | ICD-10-CM

## 2018-10-03 ENCOUNTER — HOSPITAL ENCOUNTER (OUTPATIENT)
Dept: PHYSICAL THERAPY | Facility: CLINIC | Age: 83
Setting detail: THERAPIES SERIES
End: 2018-10-03
Attending: FAMILY MEDICINE
Payer: MEDICARE

## 2018-10-03 PROCEDURE — 40000449 ZZHC STATISTIC PT VISIT, LYMPHEDEMA: Mod: ZF | Performed by: PHYSICAL THERAPIST

## 2018-10-03 PROCEDURE — 97535 SELF CARE MNGMENT TRAINING: CPT | Mod: GP,ZF | Performed by: PHYSICAL THERAPIST

## 2018-10-03 PROCEDURE — G8989 SELF CARE D/C STATUS: HCPCS | Mod: GP,CI,ZF | Performed by: PHYSICAL THERAPIST

## 2018-10-03 PROCEDURE — G8988 SELF CARE GOAL STATUS: HCPCS | Mod: GP,CI,ZF | Performed by: PHYSICAL THERAPIST

## 2018-10-04 DIAGNOSIS — C68.9 UROTHELIAL CANCER (H): Primary | ICD-10-CM

## 2018-10-04 RX ORDER — CEFAZOLIN SODIUM 2 G/50ML
2 SOLUTION INTRAVENOUS
Status: CANCELLED | OUTPATIENT
Start: 2018-10-04

## 2018-10-04 RX ORDER — CEFAZOLIN SODIUM 1 G/50ML
1 INJECTION, SOLUTION INTRAVENOUS SEE ADMIN INSTRUCTIONS
Status: CANCELLED | OUTPATIENT
Start: 2018-10-04

## 2018-10-04 NOTE — PROGRESS NOTES
Outpatient Physical Therapy Discharge Note     Patient: Dimple Bermudez  : 1933    Beginning/End Dates of Reporting Period:  18 to 10/4/2018    Referring Provider: Dr. Bel Mckeon    Therapy Diagnosis: B LE lymphedema     Client Self Report: Pt was hospitalized 9/10-18 and has Stage I kidney and will have surgery soon. She reports less pain and less edema than at initial eval and is pleased with this. She is frustrated with the amount of time the home program takes. She uses compreflex boot and lower leg garment 2x w and is compliant with all aspects of home program    Objective Measurements:  Objective Measure: volume  Details: -6% on R LE and -4.2 on L LE, see girth chart for details  Objective Measure: skin  Details: Skin in good condition, Pt denies tenderness during MLD  Objective Measure: Pain  Details: no pain      Outcome Measures (most recent score):  Lymphedema Life Impact Scale (score range 0-72). A higher score indicates greater impairment.: 18, an improvement from 29          Goals:  Goal Identifier Home program   Goal Description pt will be independent with home program to manage her B LE swelling   Target Date 10/06/18   Date Met  10/03/18   Progress:     Goal Identifier Cirucumference   Goal Description Pt will have 2cm decrease in measurement of B LE at 10cm above the heel for decreased risk of infection   Target Date 10/06/18   Date Met   had overall reduction but did not meet this goal   Progress:     Goal Identifier LLIS   Goal Description Pt will score 19 or less on LLIS for MICD in impact of swelling on quality of life.     Target Date 10/03/18   Date Met   10/3/18     Progress:                              Progress Toward Goals:   Progress this reporting period: Pt met 2 of 3 goals and had good overall reduction. She was given information on what compression stockings to purchase that will not slip down from legs. She was told she no longer has to bandage at night, which  will free up some time.      Plan:  Discharge from therapy. Wear compression stockings all waking hours. Wear velcro compression garments 2 nights /week      Discharge:Yes    Reason for Discharge: Patient has met 2 of 3 goals.    Equipment Issued: Pt has Flex compression stockings 20-30, was told to try Medi size 5, comfort or plus with silicone beads so they won't slip down and has compreboot and compreflex lower leg garment for night time compression    Discharge Plan: Patient to continue home program.

## 2018-10-05 NOTE — PROGRESS NOTES
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    Continued hematuria over the last month mostly, but episode of minimal hematuria 1.5 weeks ago    Reports some incontinence       Assessment:   History of hematuria, positive FISH, with localization to the right kidney upon washings.    In addition the ureterescopy was also abnormal     Will plan for right robotic nephu with removal of stent.     Post op had some ureteral swelling and acute kidney injury that resolved with stent placement.        Stuart King MD  Department of Urology Staff  [facility]

## 2018-10-08 ENCOUNTER — TELEPHONE (OUTPATIENT)
Dept: UROLOGY | Facility: CLINIC | Age: 83
End: 2018-10-08

## 2018-10-08 NOTE — TELEPHONE ENCOUNTER
[facility] Call Center  Phone Message  May a detailed message be left on voicemail: yes      Reason for Call: Rena spoke with a procedure  on 9.20.18 and was told that surgery would be scheduled. No one has followed up yet.    Reason for Call: Symptoms or Concerns     Current symptom or concern: Blood with urination (just a tinge) she noticed pink. Rena indicates that Dimple explained a quiver or discomfort during urination.  Symptoms have been present for:  2 day(s)  Has patient previously been seen for this? Yes  By : Dr. King and he suggested surgery for removal of right kidney cancer  Date: 9.20.18  Are there any new or worsening symptoms? Yes: Pt is now wanting to move forward with surgery      Action Taken: Message routed to:  [facility] ([facility]rology

## 2018-10-08 NOTE — TELEPHONE ENCOUNTER
Patient 's daughter called again very upset that surgery has not been set up  This was to ASAP  We just received orders on Thursday  Sent to mariella. Zulema Smith LPN Staff Nurse  w

## 2018-10-09 ENCOUNTER — TELEPHONE (OUTPATIENT)
Dept: UROLOGY | Facility: CLINIC | Age: 83
End: 2018-10-09

## 2018-10-09 NOTE — TELEPHONE ENCOUNTER
Patient is scheduled for surgery with Dr. King      Spoke or left message with: Bertha (daughter in law)    Date of Surgery: 11/13/18    Location: [facility]    Informed patient they will need an adult  yes    Pre-op with surgeon (if applicable): n/a    H&P: Scheduled with PAC 11/1/18    Additional imaging/appointments: n/a    Surgery packet: mailed 10/9/18     Additional comments: n/a

## 2018-10-13 ENCOUNTER — APPOINTMENT (OUTPATIENT)
Dept: ULTRASOUND IMAGING | Facility: CLINIC | Age: 83
End: 2018-10-13
Attending: EMERGENCY MEDICINE
Payer: MEDICARE

## 2018-10-13 ENCOUNTER — NURSE TRIAGE (OUTPATIENT)
Dept: NURSING | Facility: CLINIC | Age: 83
End: 2018-10-13

## 2018-10-13 ENCOUNTER — HOSPITAL ENCOUNTER (EMERGENCY)
Facility: CLINIC | Age: 83
Discharge: HOME | End: 2018-10-13
Attending: EMERGENCY MEDICINE | Admitting: EMERGENCY MEDICINE
Payer: MEDICARE

## 2018-10-13 VITALS
OXYGEN SATURATION: 96 % | BODY MASS INDEX: 30.87 KG/M2 | HEIGHT: 57 IN | HEART RATE: 83 BPM | WEIGHT: 143.1 LBS | RESPIRATION RATE: 16 BRPM | DIASTOLIC BLOOD PRESSURE: 46 MMHG | SYSTOLIC BLOOD PRESSURE: 136 MMHG | TEMPERATURE: 97.9 F

## 2018-10-13 DIAGNOSIS — M71.21 BAKER'S CYST OF KNEE, RIGHT: ICD-10-CM

## 2018-10-13 PROCEDURE — 93971 EXTREMITY STUDY: CPT | Mod: RT

## 2018-10-13 PROCEDURE — 99284 EMERGENCY DEPT VISIT MOD MDM: CPT | Mod: 25 | Performed by: EMERGENCY MEDICINE

## 2018-10-13 PROCEDURE — 99283 EMERGENCY DEPT VISIT LOW MDM: CPT | Mod: Z6 | Performed by: EMERGENCY MEDICINE

## 2018-10-13 PROCEDURE — 25000132 ZZH RX MED GY IP 250 OP 250 PS 637: Mod: GY | Performed by: EMERGENCY MEDICINE

## 2018-10-13 PROCEDURE — A9270 NON-COVERED ITEM OR SERVICE: HCPCS | Mod: GY | Performed by: EMERGENCY MEDICINE

## 2018-10-13 RX ORDER — HYDROCODONE BITARTRATE AND ACETAMINOPHEN 5; 325 MG/1; MG/1
1 TABLET ORAL ONCE
Status: COMPLETED | OUTPATIENT
Start: 2018-10-13 | End: 2018-10-13

## 2018-10-13 RX ORDER — HYDROCODONE BITARTRATE AND ACETAMINOPHEN 5; 325 MG/1; MG/1
1 TABLET ORAL EVERY 6 HOURS PRN
Qty: 10 TABLET | Refills: 0 | Status: SHIPPED | OUTPATIENT
Start: 2018-10-13 | End: 2019-01-02

## 2018-10-13 RX ADMIN — HYDROCODONE BITARTRATE AND ACETAMINOPHEN 1 TABLET: 5; 325 TABLET ORAL at 11:11

## 2018-10-13 ASSESSMENT — ENCOUNTER SYMPTOMS
CONSTITUTIONAL NEGATIVE: 1
JOINT SWELLING: 1

## 2018-10-13 NOTE — ED AVS SNAPSHOT
[facility], Emergency Department    [address] [facility] [facility] [address]    Phone:  [phone]                                       Dimple Oviedo   MRN: [id number]    Department:  [facility], Emergency Department   Date of Visit:  10/13/2018           Patient Information     Date Of Birth          6/23/1933        Your diagnoses for this visit were:     Baker's cyst of knee, right        You were seen by Michael Tyson MD.        Discharge Instructions         Use pain medications if needed  Follow up with Orthopedics and your Clinic  Please make an appointment to follow up with Orthopedics (phone: [phone])    Treatment for Baker s Cyst (Popliteal Cyst)  A Baker s cyst (popliteal cyst) is a fluid-filled sac that forms behind the knee.  Types of treatment  You likely won t need any treatment if you don t have any symptoms from your Baker s cyst. Some Baker s cysts go away without any treatment. If your cyst starts causing symptoms, you might need treatment at that time.  If you do have symptoms, you may be treated depending on the cause of your cyst. For example, you may need medicine for rheumatoid arthritis. Or you may need physical therapy for osteoarthritis.  Other treatments for a Baker s cyst can include:    Over-the-counter pain medicines    Arthrocentesis to remove extra fluid from the joint space    Steroid injection into the joint to reduce cyst size    Surgery to remove the cyst  Possible complications of a Baker s cyst  In rare cases, a Baker s cyst may cause complications. The cyst may get larger, which may cause redness and swelling. The cyst may also rupture, causing warmth, redness, and pain in your calf.  The symptoms may be the same as a blood clot in the veins of the legs. Your health care provider may need imaging tests of your leg to make sure you don t have a clot. Rupture can also lead to its own complications, such as:    Trapping of a tibial nerve. This cases  calf pain and numbness behind the leg. It can be treated with arthrocentesis and steroid injections.    Blockage of the popliteal artery. This causes pain and lack of blood flow to the leg. It can also be treated with arthrocentesis and steroid injections.    Compartment syndrome. This causes intense pain and problems moving the foot or toes. Compartment syndrome is a medical emergency. It needs immediate surgery. It can lead to permanent muscle damage if not treated right away.  When to call the health care provider  If your cyst starts causing mild symptoms, plan to see your health care provider soon. See him or her right away if you have symptoms such as redness and swelling of your leg. These symptoms may mean your Baker s cyst has ruptured.      Date Last Reviewed: 7/21/2015 2000-2017 [employer]. [address] [place] [address]. All rights reserved. This information is not intended as a substitute for professional medical care. Always follow your healthcare professional's instructions.          Your next 10 appointments already scheduled     Nov 01, 2018 12:30 PM CDT   LAB with [facility] ([facility])    [address] [place] [address] [phone]           Please do not eat 10-12 hours before your appointment if you are coming in fasting for labs on lipids, cholesterol, or glucose (sugar). This does not apply to pregnant women. Water, hot tea and black coffee (with nothing added) are okay. Do not drink other fluids, diet soda or chew gum.            Nov 01, 2018  1:00 PM CDT   (Arrive by 12:45 PM)   PAC EVALUATION with NELSON Kwok [facility] ([facility])    [address] [place] [address] [phone]            Nov 13, 2018   Procedure with Stuart King MD   [facility], [place], Same Day Surgery (--)    [address]  [facility] [address]   [phone]            Nov 26, 2018  1:30 PM CST   US THYROID with US2   [facility] US ([facility])    [address] [place] [id number] [phone]           How do I prepare for my exam? (Food and drink instructions) No Food and Drink Restrictions.  How do I prepare for my exam? (Other instructions) You do not need to do anything special to prepare for your exam.  What should I wear: Wear comfortable clothes.  How long does the exam take: Most ultrasounds take 30 to 60 minutes.  What should I bring: Bring a list of your medicines, including vitamins, minerals and over-the-counter drugs. It is safest to leave personal items at home.  Do I need a :  No  is needed.  What do I need to tell my doctor: Tell your doctor about any allergies you may have.  What should I do after the exam: No restrictions, You may resume normal activities.  What is this test: An ultrasound uses sound waves to make pictures of the body. Sound waves do not cause pain. The only discomfort may be the pressure of the wand against your skin or full bladder.  Who should I call with questions: If you have any questions, please call the Imaging Department where you will have your exam. Directions, parking instructions, and other information is available on our website, [email or web address].            Nov 26, 2018  2:15 PM CST   LAB with [facility] ([facility])    [address] [place] [id number] [phone]           Please do not eat 10-12 hours before your appointment if you are coming in fasting for labs on lipids, cholesterol, or glucose (sugar). This does not apply to pregnant women. Water, hot tea and black coffee (with nothing added) are okay. Do not drink other fluids, diet soda or chew gum.            Nov 27, 2018 11:30 AM CST   (Arrive by 11:15 AM)   Post-Op with  ALYSSA Mejia   [facility] ([facility])    [address] [facility]  [address] [facility] [address]   [phone]            Dec 04, 2018  1:30 PM CST   (Arrive by 1:15 PM)   RETURN ENDOCRINE with Dhara Meza MD   [facility] Endocrinology ([facility])    [address] [place] [address]  3rd [facility] [address]   [phone]            Dec 06, 2018 11:00 AM CST   (Arrive by 10:45 AM)   Post-Op with Stuart King MD   [facility] ([facility])    [address] [place] [address]  [address] [facility] [address]   [phone]              24 Hour Appointment Hotline       To make an appointment at any [facility], call [phone] ([phone]). If you don't have a family doctor or clinic, we will help you find one. [facility] are conveniently located to serve the needs of you and your family.             Review of your medicines      START taking        Dose / Directions Last dose taken    HYDROcodone-acetaminophen 5-325 MG per tablet   Commonly known as:  NORCO   Dose:  1 tablet   Quantity:  10 tablet        Take 1 tablet by mouth every 6 hours as needed for severe pain   Refills:  0          Our records show that you are taking the medicines listed below. If these are incorrect, please call your family doctor or clinic.        Dose / Directions Last dose taken    acetaminophen 650 MG CR tablet   Commonly known as:  TYLENOL   Dose:  650 mg        Take 650 mg by mouth as needed   Refills:  0        alendronate 70 MG tablet   Commonly known as:  FOSAMAX   Quantity:  12 tablet        TAKE 1 TABLET EVERY 7 DAYS AT LEAST 60 MINUTES BEFORE BREAKFAST AS DIRECTED. SEE PACKAGE FOR ADDITIONAL INSTRUCTIONS   Refills:  0        aspirin 81 MG tablet   Dose:  81 mg        Take 81 mg by mouth daily   Refills:  0         CALCIUM + D PO   Dose:  1 tablet        Take 1 tablet by mouth 2 times daily   Refills:  0        colestipol 1 g tablet   Commonly known as:  COLESTID   Dose:  1 g        Take 1 g by mouth 2 times daily TAKE OTHER MEDS 1 HOUR BEFORE OR 4 HOURS AFTER COLESID   Refills:  0        cycloSPORINE 0.05 % ophthalmic emulsion   Commonly known as:  RESTASIS   Dose:  1 drop        Place 1 drop into both eyes 2 times daily   Refills:  0        diazepam 2 MG tablet   Commonly known as:  VALIUM        Refills:  0        ferrous sulfate 325 (65 Fe) MG Tbec EC tablet   Dose:  325 mg        Take 325 mg by mouth daily   Refills:  0        Fish Oil 500 MG Caps   Dose:  1 capsule        Take 1 capsule by mouth 2 times daily   Refills:  0        furosemide 20 MG tablet   Commonly known as:  LASIX   Dose:  20 mg   Quantity:  90 tablet        Take 1 tablet (20 mg) by mouth every morning   Refills:  1        irbesartan 300 MG tablet   Commonly known as:  AVAPRO   Quantity:  90 tablet        TAKE 1 TABLET EVERY DAY   Refills:  2        lovastatin 40 MG tablet   Commonly known as:  MEVACOR   Quantity:  90 tablet        TAKE 1 TABLET AT BEDTIME (HYPERLIPIDEMIA LDL GOAL BELOW 130)   Refills:  2        Melatonin 10 MG Tabs tablet   Dose:  10 mg        Take 10 mg by mouth as needed for sleep   Refills:  0        methimazole 5 MG tablet   Commonly known as:  TAPAZOLE   Dose:  5 mg   Quantity:  90 tablet        Take 1 tablet (5 mg) by mouth daily   Refills:  1        nitroGLYcerin 0.4 MG sublingual tablet   Commonly known as:  NITROSTAT   Quantity:  25 tablet        For chest pain place 1 tablet under the tongue every 5 minutes for 3 doses. If symptoms persist 5 minutes after 1st dose call 911.   Refills:  3        omeprazole 20 MG CR capsule   Commonly known as:  priLOSEC   Dose:  20 mg   Quantity:  180 capsule        Take 1 capsule (20 mg) by mouth daily   Refills:  3        order for DME   Quantity:  1 each        Equipment being ordered: Knee  high compression for B LE 20-30mmHg, Velcro units for night time (consider hybrid sock as foot swelling is less than leg swelling), Donning device   Refills:  2        PROBIOTIC ADVANCED PO   Dose:  1 capsule        Take 1 capsule by mouth every morning   Refills:  0        propranolol 60 MG 24 hr capsule   Commonly known as:  INDERAL LA   Dose:  60 mg   Quantity:  90 capsule        Take 1 capsule (60 mg) by mouth daily   Refills:  1        rOPINIRole 0.25 MG tablet   Commonly known as:  REQUIP   Quantity:  90 tablet        TAKE 1 TABLET(0.25 MG) BY MOUTH EVERY NIGHT AS NEEDED   Refills:  1        senna-docusate 8.6-50 MG per tablet   Commonly known as:  SENOKOT-S;PERICOLACE   Dose:  1-2 tablet   Quantity:  45 tablet        Take 1-2 tablets by mouth 2 times daily To prevent constipation   Refills:  0        sertraline 100 MG tablet   Commonly known as:  ZOLOFT   Dose:  100 mg   Quantity:  90 tablet        Take 1 tablet (100 mg) by mouth every morning   Refills:  1        SYSTANE 0.4-0.3 % Soln ophthalmic solution   Dose:  1 drop   Generic drug:  polyethylene glycol 0.4%- propylene glycol 0.3%        Place 1 drop into both eyes 2 times daily as needed for dry eyes   Refills:  0        traZODone 50 MG tablet   Commonly known as:  DESYREL   Quantity:  45 tablet        TAKE 1/2 TABLET(25 MG)  AT BEDTIME   Refills:  1                Information about OPIOIDS     PRESCRIPTION OPIOIDS: WHAT YOU NEED TO KNOW   We gave you an opioid (narcotic) pain medicine. It is important to manage your pain, but opioids are not always the best choice. You should first try all the other options your care team gave you. Take this medicine for as short a time (and as few doses) as possible.    Some activities can increase your pain, such as bandage changes or therapy sessions. It may help to take your pain medicine 30 to 60 minutes before these activities. Reduce your stress by getting enough sleep, working on hobbies you enjoy and  practicing relaxation or meditation. Talk to your care team about ways to manage your pain beyond prescription opioids.    These medicines have risks:    DO NOT drive when on new or higher doses of pain medicine. These medicines can affect your alertness and reaction times, and you could be arrested for driving under the influence (DUI). If you need to use opioids long-term, talk to your care team about driving.    DO NOT operate heavy machinery    DO NOT do any other dangerous activities while taking these medicines.    DO NOT drink any alcohol while taking these medicines.     If the opioid prescribed includes acetaminophen, DO NOT take with any other medicines that contain acetaminophen. Read all labels carefully. Look for the word  acetaminophen  or  Tylenol.  Ask your pharmacist if you have questions or are unsure.    You can get addicted to pain medicines, especially if you have a history of addiction (chemical, alcohol or substance dependence). Talk to your care team about ways to reduce this risk.    All opioids tend to cause constipation. Drink plenty of water and eat foods that have a lot of fiber, such as fruits, vegetables, prune juice, apple juice and high-fiber cereal. Take a laxative (Miralax, milk of magnesia, Colace, Senna) if you don t move your bowels at least every other day. Other side effects include upset stomach, sleepiness, dizziness, throwing up, tolerance (needing more of the medicine to have the same effect), physical dependence and slowed breathing.    Store your pills in a secure place, locked if possible. We will not replace any lost or stolen medicine. If you don t finish your medicine, please throw away (dispose) as directed by your pharmacist. The [place] Pollution Control Agency has more information about safe disposal: https://www.pca.[facility].[place].us/living-green/managing-unwanted-medications        Prescriptions were sent or printed at these locations (1 Prescription)                    Other Prescriptions                Printed at Department/Unit printer (1 of 1)         HYDROcodone-acetaminophen (NORCO) 5-325 MG per tablet                Procedures and tests performed during your visit     US Lower Extremity Venous Duplex Right      Orders Needing Specimen Collection     None      Pending Results     No orders found from 10/11/2018 to 10/14/2018.            Pending Culture Results     No orders found from 10/11/2018 to 10/14/2018.            Pending Results Instructions     If you had any lab results that were not finalized at the time of your Discharge, you can call the ED Lab Result RN at [phone]. You will be contacted by this team for any positive Lab results or changes in treatment. The nurses are available 7 days a week from 10A to 6:30P.  You can leave a message 24 hours per day and they will return your call.        Thank you for choosing [facility]       Thank you for choosing [facility] for your care. Our goal is always to provide you with excellent care. Hearing back from our patients is one way we can continue to improve our services. Please take a few minutes to complete the written survey that you may receive in the mail after you visit with us. Thank you!        [facility] Information     [facility] gives you secure access to your electronic health record. If you see a primary care provider, you can also send messages to your care team and make appointments. If you have questions, please call your primary care clinic.  If you do not have a primary care provider, please call [phone] and they will assist you.        Care EveryWhere ID     This is your Care EveryWhere ID. This could be used by other organizations to access your [facility] medical records  [id number]        Equal Access to Services     GUNNER RODRIGUEZ : Aleja Pollack, wasyed barnard, qaybta kamaldonado pineda. So [facility] [phone].    ATENCIÓN: Mari shafer  español, tiene a sierra disposición servicios gratuitos de asistencia lingüística. Al al [phone].    We comply with applicable federal civil rights laws and [place] laws. We do not discriminate on the basis of race, color, national origin, age, disability, sex, sexual orientation, or gender identity.            After Visit Summary       This is your record. Keep this with you and show to your community pharmacist(s) and doctor(s) at your next visit.

## 2018-10-13 NOTE — PROGRESS NOTES
Social Work: Assessment with Discharge Plan    Patient Name:  Dimple Valdez  :  1933  Age:  85 year old  MRN:  [id number]  Risk/Complexity Score:     Completed assessment with: Chart review, patient, patient's son    Presenting Information   Reason for Referral:  Discharge plan  Date of Intake:  2018  Referral Source:  Chart Review  Decision Maker: Patient  Alternate Decision Maker: Patient's son-Issa Valdez P) [phone]  Health Care Directive:  Copy in Chart  Living Situation:  Independent senior cooperative  Previous Functional Status:  Independent  Patient and family understanding of hospitalization: Right knee pain  Cultural/Language/Spiritual Considerations: Anabaptist per demographics  Adjustment to Illness:  Calm and appears comfortable    Physical Health  Reason for Admission:  No diagnosis found.  Services Needed/Recommended:  Home with no services    Mental Health/Chemical Dependency  Diagnosis:  None reported.  Support/Services in Place:  None reported.  Services Needed/Recommended:  none    Support System  Significant relationship at present time: Son and daughter-in-law  Family of origin is available for support: Yes  Other support available: [employer], neighbors.  Gaps in support system:  none  Patient is caregiver to:  None     Provider Information   Primary Care Physician:  Pop Zapien   [phone]   Clinic:  [address] [place], [place] [id number]      :  none    Financial   Income Source:  Not discussed.  Financial Concerns:  None reported.   Insurance:    Payor/Plan Subscriber Name Rel Member # Group #   MEDICARE - MEDICARE TERRANCE VALDEZKAREN AGUDELO  [id number]       ATTN CLAIMS, [address]   COMMERCIAL - [facility] JOSÉ MIGUELDIMPLE AGUDELO  [id number]       [facility] CLAIM DIV, [address]       Discharge Plan   Patient and family discharge goal: Home  Provided education on discharge plan:  YES  Patient agreeable to discharge  "plan:  YES  A list of Medicare Certified Facilities was provided to the patient and/or family to encourage patient choice. Patient's choices for facility are:  Not applicable at time of assessment.   Will NH provide Skilled rehabilitation or complex medical:  Not applicable at time of assessment.   General information regarding anticipated insurance coverage and possible out of pocket cost was discussed. Patient and patient's family are aware patient may incur the cost of transportation to the facility, pending insurance payment: YES  Barriers to discharge:  TBD - Pending patient's progress and further recommendation.    Discharge Recommendations   Anticipated Disposition:  Home, no needs identified  Transportation Needs:  Family:  son Issa  Name of Transportation Company and Phone:  -    Additional comments   JUANITA consulted via chart review given patient's age (85) and prior ED SW assessment.  ED SW met with patient, Dimple, along with her son to update her assessment.  Dimple is alert and oriented.    Dimple is an 85-year-old  female who lives independently and alone in a senior cooperative.  She reports she is typically independent in her ADLs but has support from neighbors and family as needed.  She has a  once a month.  She is still driving otherwise her son or daughter-in-law provide transportation.  She is aware of community resources and notes she will contact \"[facility] [employer]\" for additional supports and resources.  Her last ED SW assessment, JUANITA provided information regarding [employer] mobility, [place] senior linkage line and private pay PCA services.  Dimple also has a long-term care insurance policy that may cover some services when needed.    Plan: TBD - Pending patient's progress and further recommendation.  Anticipate home discharge with her son once medically stable.  Dimple denies any immediate needs does not anticipate any home care or other needs at this " time.    Family contact - Issa Troncosoer (p)  (present in the ED and will provide transportation at time of discharge)    CYDNEY Duncan, [facility]  Social Work Services, Emergency Dept [facility]  Pager: [phone] Mon-Sat 9 am - 9 pm, on-call/after hours pager [phone]

## 2018-10-13 NOTE — TELEPHONE ENCOUNTER
"    Reason for Disposition    Unable to walk    Additional Information    Negative: Looks like a broken bone or dislocated joint (e.g., crooked or deformed)    Negative: Sounds like a life-threatening emergency to the triager    Negative: Followed a leg injury    Negative: Leg swelling is main symptom    Negative: Back pain radiating (shooting) into leg(s)    Negative: Knee pain is main symptom    Negative: Ankle pain is main symptom    Negative: Pregnant    Negative: Entire foot is cool or blue in comparison to other side    Negative: Difficulty breathing    Negative: Chest pain    Answer Assessment - Initial Assessment Questions  1. ONSET: \"When did the pain start?\"       Last evening  2. LOCATION: \"Where is the pain located?\"       Behind right knee  3. PAIN: \"How bad is the pain?\"    (Scale 1-10; or mild, moderate, severe)    -  MILD (1-3): doesn't interfere with normal activities     -  MODERATE (4-7): interferes with normal activities (e.g., work or school) or awakens from sleep, limping     -  SEVERE (8-10): excruciating pain, unable to do any normal activities, unable to walk      Moderate-severe, unable to walk without pain  4. WORK OR EXERCISE: \"Has there been any recent work or exercise that involved this part of the body?\"       Sitting playing cards  5. CAUSE: \"What do you think is causing the leg pain?\"      ?blood clot  6. OTHER SYMPTOMS: \"Do you have any other symptoms?\" (e.g., chest pain, back pain, breathing difficulty, swelling, rash, fever, numbness, weakness)      no  7. PREGNANCY: \"Is there any chance you are pregnant?\" \"When was your last menstrual period?\"      no    Protocols used: LEG PAIN-ADULT-AH      "

## 2018-10-13 NOTE — ED PROVIDER NOTES
History     Chief Complaint   Patient presents with     Knee Pain     right     HPI  Dimple Oviedo is a 85 year old female with a history of HTN, GERD, hyperthyroidism, atrial fibrillation w/RVR, hyperlipidemia, polymyalgia rheumatica, and stress-induced cardiomyopathy who presents for evaluation of right knee pain. Patient complains of right knee pain with associated swelling that began last night. She denies trauma or injury to the knee. She does have a history of arthritis in her knee. No history of DVT or PE.     I have reviewed the Medications, Allergies, Past Medical and Surgical History, and Social History in the [employer] system.  Past Medical History:   Diagnosis Date     Calculus of kidney      Esophageal reflux      GERD (gastroesophageal reflux disease)      Hyperlipidemia LDL goal <130 5/9/2010     Malignant melanoma of skin of trunk, except scrotum (H)      Nonspecific abnormal finding     has living will 2004 -      Nontoxic multinodular goiter     no further eval /tx rec per pt     Osteopenia      Other psoriasis      Personal history of colonic polyps      PMR (polymyalgia rheumatica) (H)      Stress-induced cardiomyopathy      Undiagnosed cardiac murmurs      Unspecified constipation      Unspecified essential hypertension        Past Surgical History:   Procedure Laterality Date     BIOPSY       C NONSPECIFIC PROCEDURE  2005    colonoscopy polyp repeat 2010     COLONOSCOPY  2014     COMBINED CYSTOSCOPY, INSERT STENT URETER(S) Bilateral 9/12/2018    Procedure: COMBINED CYSTOSCOPY, INSERT STENT URETER(S);  Cystoscopy Bilateral ureteral Stent Placement.;  Surgeon: Justin Henry MD;  Location: [facility] OR     COMBINED CYSTOSCOPY, RETROGRADES, URETEROSCOPY, INSERT STENT Bilateral 4/3/2018    Procedure: COMBINED CYSTOSCOPY, RETROGRADES, URETEROSCOPY, INSERT STENT;;  Surgeon: Stuart King MD;  Location: [facility] OR     COMBINED CYSTOSCOPY, RETROGRADES, URETEROSCOPY, INSERT STENT Bilateral  9/10/2018    Procedure: COMBINED CYSTOSCOPY, RETROGRADES, URETEROSCOPY, INSERT STENT;  Cystoscopy, Bilateral Ureteroscopy, Bladder Biopsies, Retrogram Pyelograms, Ureteral Washings and brushings, cysview;  Surgeon: Stuart King MD;  Location: [facility]     CYSTOSCOPY, BIOPSY BLADDER INSTILL OPTICAL AGENT N/A 4/3/2018    Procedure: CYSTOSCOPY, BIOPSY BLADDER INSTILL OPTICAL AGENT;  Cystoscopy, Blue Light Cystoscopy, Bladder Biopsies, Bilateral Selective ureteral washings for Cytology, Bilateral Retrograde Pyelograms, Bilateral Ureteroscopy;  Surgeon: Stuart King MD;  Location: [facility]     CYSTOSCOPY, BIOPSY BLADDER, COMBINED N/A 2/19/2018    Procedure: COMBINED CYSTOSCOPY, BIOPSY BLADDER;  Cystoscopy, Bladder Biopsy;  Surgeon: Kenna La MD;  Location: [facility]     ENDOSCOPIC ULTRASOUND LOWER GASTROINTESTIONAL TRACT (GI) N/A 10/30/2015    Procedure: ENDOSCOPIC ULTRASOUND LOWER GASTROINTESTIONAL TRACT (GI);  Surgeon: Daniel Jean-Baptiste MD;  Location: [facility] OR     EYE SURGERY  12/4/17     LAPAROSCOPIC CHOLECYSTECTOMY WITH CHOLANGIOGRAMS N/A 11/1/2015    Procedure: LAPAROSCOPIC CHOLECYSTECTOMY WITH CHOLANGIOGRAMS;  Surgeon: Tonie Warren MD;  Location: [facility] OR     SURGICAL HISTORY OF -   1996    malignant melanoma     SURGICAL HISTORY OF -   1968    thyroid nodule     SURGICAL HISTORY OF -       D & C       Family History   Problem Relation Age of Onset     Cancer Father      dec - esophageal and laryngeal     HEART DISEASE Mother      Respiratory Mother      dec     Breast Cancer Daughter      Other Cancer Daughter      Thyroid Disease Daughter      Asthma Daughter      Hyperlipidemia Son      Diabetes Son        Social History   Substance Use Topics     Smoking status: Never Smoker     Smokeless tobacco: Never Used     Alcohol use No      Comment: 6 times per year-one drink       No current facility-administered medications for this encounter.      Current Outpatient Prescriptions  "  Medication     acetaminophen (TYLENOL) 650 MG CR tablet     alendronate (FOSAMAX) 70 MG tablet     aspirin 81 MG tablet     Calcium Citrate-Vitamin D (CALCIUM + D PO)     colestipol (COLESTID) 1 g tablet     cycloSPORINE (RESTASIS) 0.05 % ophthalmic emulsion     diazepam (VALIUM) 2 MG tablet     ferrous sulfate 325 (65 Fe) MG TBEC EC tablet     furosemide (LASIX) 20 MG tablet     HYDROcodone-acetaminophen (NORCO) 5-325 MG per tablet     irbesartan (AVAPRO) 300 MG tablet     lovastatin (MEVACOR) 40 MG tablet     Melatonin 10 MG TABS tablet     methimazole (TAPAZOLE) 5 MG tablet     Omega-3 Fatty Acids (FISH OIL) 500 MG CAPS     omeprazole (PRILOSEC) 20 MG CR capsule     polyethylene glycol 0.4%- propylene glycol 0.3% (SYSTANE) 0.4-0.3 % SOLN ophthalmic solution     Probiotic Product (PROBIOTIC ADVANCED PO)     propranolol (INDERAL LA) 60 MG 24 hr capsule     rOPINIRole (REQUIP) 0.25 MG tablet     senna-docusate (SENOKOT-S;PERICOLACE) 8.6-50 MG per tablet     sertraline (ZOLOFT) 100 MG tablet     traZODone (DESYREL) 50 MG tablet     nitroGLYcerin (NITROSTAT) 0.4 MG sublingual tablet     order for DME        Allergies   Allergen Reactions     No Known Drug Allergies        Review of Systems   Constitutional: Negative.    Musculoskeletal: Positive for joint swelling (R knee). Negative for gait problem.        Positive for R knee pain   Skin: Negative.    All other systems reviewed and are negative.      Physical Exam   BP: 123/45  Pulse: 64  Temp: 97.9  F (36.6  C)  Resp: 16  Height: 144.8 cm (4' 9\")  Weight: 64.9 kg (143 lb 1.6 oz)  SpO2: 97 %      Physical Exam   Constitutional: She is oriented to person, place, and time. She appears well-developed and well-nourished. No distress.   Pulmonary/Chest: No respiratory distress.   Musculoskeletal:        Right knee: She exhibits swelling and effusion. She exhibits normal range of motion. Tenderness found.        Legs:  She has a mild effusion on the right with some " popliteal tenderness and fullness.  Her calf is negative   Neurological: She is alert and oriented to person, place, and time.   Skin: Skin is warm. No rash noted.   Psychiatric: She has a normal mood and affect. Her behavior is normal.   Nursing note and vitals reviewed.      ED Course     ED Course     Procedures       10:59 AM  The patient was seen and examined by Dr. Tyson in Room 3.     Results for orders placed or performed during the hospital encounter of 10/13/18   US Lower Extremity Venous Duplex Right    Narrative    PRELIMINARY REPORT - The following report is a preliminary  interpretation.      Impression    IMPRESSION:  1. No DVT demonstrated in right lower extremity.  2. Right Baker's cyst.       Medications   HYDROcodone-acetaminophen (NORCO) 5-325 MG per tablet 1 tablet (1 tablet Oral Given 10/13/18 1111)            Labs Ordered and Resulted from Time of ED Arrival Up to the Time of Departure from the ED - No data to display         Assessments & Plan (with Medical Decision Making)   85 year old female presents with acute, non-traumatic pain behind her right knee. Ultrasound shows a Baker's cyst, no evidence for DVT. She was given one Vicodin and the knee was ACE-wrapped. Will give her the phone number for the Orthopedic Clinic for follow up. She had questions about her restless leg syndrome. She will need to talk to her clinic physician about that. She does have a history of osteoarthritis and has had previous steroid injections. As there was no history of trauma an x-ray was not done.     I have reviewed the nursing notes.    I have reviewed the findings, diagnosis, plan and need for follow up with the patient.    New Prescriptions    HYDROCODONE-ACETAMINOPHEN (NORCO) 5-325 MG PER TABLET    Take 1 tablet by mouth every 6 hours as needed for severe pain       Final diagnoses:   Baker's cyst of knee, right   I, Kimberlee Zhang, am serving as a trained medical scribe to document services  personally performed by Hussain Tyson MD, based on the provider's statements to me.   I, Hussain Tyson MD, was physically present and have reviewed and verified the accuracy of this note documented by Kimberlee Zhang.      10/13/2018   [facility], [place], EMERGENCY DEPARTMENT     Michael Tyson MD  10/13/18 [id number]

## 2018-10-13 NOTE — DISCHARGE INSTRUCTIONS
Use pain medications if needed  Follow up with Orthopedics and your Clinic  Please make an appointment to follow up with Orthopedics (phone: [phone])    Treatment for Baker s Cyst (Popliteal Cyst)  A Baker s cyst (popliteal cyst) is a fluid-filled sac that forms behind the knee.  Types of treatment  You likely won t need any treatment if you don t have any symptoms from your Baker s cyst. Some Baker s cysts go away without any treatment. If your cyst starts causing symptoms, you might need treatment at that time.  If you do have symptoms, you may be treated depending on the cause of your cyst. For example, you may need medicine for rheumatoid arthritis. Or you may need physical therapy for osteoarthritis.  Other treatments for a Baker s cyst can include:    Over-the-counter pain medicines    Arthrocentesis to remove extra fluid from the joint space    Steroid injection into the joint to reduce cyst size    Surgery to remove the cyst  Possible complications of a Baker s cyst  In rare cases, a Baker s cyst may cause complications. The cyst may get larger, which may cause redness and swelling. The cyst may also rupture, causing warmth, redness, and pain in your calf.  The symptoms may be the same as a blood clot in the veins of the legs. Your health care provider may need imaging tests of your leg to make sure you don t have a clot. Rupture can also lead to its own complications, such as:    Trapping of a tibial nerve. This cases calf pain and numbness behind the leg. It can be treated with arthrocentesis and steroid injections.    Blockage of the popliteal artery. This causes pain and lack of blood flow to the leg. It can also be treated with arthrocentesis and steroid injections.    Compartment syndrome. This causes intense pain and problems moving the foot or toes. Compartment syndrome is a medical emergency. It needs immediate surgery. It can lead to permanent muscle damage if not treated right away.  When  to call the health care provider  If your cyst starts causing mild symptoms, plan to see your health care provider soon. See him or her right away if you have symptoms such as redness and swelling of your leg. These symptoms may mean your Baker s cyst has ruptured.      Date Last Reviewed: 7/21/2015 2000-2017 [employer]. [address] [place] [address], [place] [address]. All rights reserved. This information is not intended as a substitute for professional medical care. Always follow your healthcare professional's instructions.

## 2018-10-13 NOTE — ED AVS SNAPSHOT
[facility], [place], Emergency Department    [address] [place] [id number]    Phone:  [phone]                                       Dimple Oviedo   MRN: [id number]    Department:  [facility], Emergency Department   Date of Visit:  10/13/2018           After Visit Summary Signature Page     I have received my discharge instructions, and my questions have been answered. I have discussed any challenges I see with this plan with the nurse or doctor.    ..........................................................................................................................................  Patient/Patient Representative Signature      ..........................................................................................................................................  Patient Representative Print Name and Relationship to Patient    ..................................................               ................................................  Date                                   Time    ..........................................................................................................................................  Reviewed by Signature/Title    ...................................................              ..............................................  Date                                               Time          22EPIC Rev 08/18         Carpal tunnel syndrome on right  01/19/2018    Active  Soheila Beckford  Cubital tunnel syndrome, right  01/19/2018    Active  Soheila Beckford

## 2018-10-13 NOTE — ED TRIAGE NOTES
"Triage Assessment & Note:    /45  Pulse 64  Temp 97.9  F (36.6  C) (Oral)  Resp 16  Ht 1.448 m (4' 9\")  Wt 64.9 kg (143 lb 1.6 oz)  SpO2 97%  Breastfeeding? No  BMI 30.97 kg/m2      Patient presents with: Pt comes to triage with family for reports of right knee pain.  No reports of fever, cough, trauma, or CP.    Home Treatments/Remedies: home rx    Febrile / Afebrile: afebrile    Duration of C/o: < 24 hours    Marilyn Fan RN  October 13, 2018        "

## 2018-10-22 ENCOUNTER — OFFICE VISIT (OUTPATIENT)
Dept: FAMILY MEDICINE | Facility: CLINIC | Age: 83
End: 2018-10-22
Payer: MEDICARE

## 2018-10-22 VITALS
HEART RATE: 84 BPM | BODY MASS INDEX: 30.85 KG/M2 | WEIGHT: 143 LBS | HEIGHT: 57 IN | DIASTOLIC BLOOD PRESSURE: 70 MMHG | SYSTOLIC BLOOD PRESSURE: 120 MMHG | RESPIRATION RATE: 16 BRPM | TEMPERATURE: 97.1 F | OXYGEN SATURATION: 97 %

## 2018-10-22 DIAGNOSIS — G25.81 RESTLESS LEGS SYNDROME: ICD-10-CM

## 2018-10-22 DIAGNOSIS — G89.29 CHRONIC PAIN OF RIGHT KNEE: ICD-10-CM

## 2018-10-22 DIAGNOSIS — R30.0 DYSURIA: Primary | ICD-10-CM

## 2018-10-22 DIAGNOSIS — M25.561 CHRONIC PAIN OF RIGHT KNEE: ICD-10-CM

## 2018-10-22 LAB
ALBUMIN UR-MCNC: 100 MG/DL
APPEARANCE UR: ABNORMAL
BACTERIA #/AREA URNS HPF: ABNORMAL /HPF
BILIRUB UR QL STRIP: NEGATIVE
COLOR UR AUTO: ABNORMAL
GLUCOSE UR STRIP-MCNC: NEGATIVE MG/DL
HGB UR QL STRIP: ABNORMAL
KETONES UR STRIP-MCNC: NEGATIVE MG/DL
LEUKOCYTE ESTERASE UR QL STRIP: ABNORMAL
NITRATE UR QL: NEGATIVE
NON-SQ EPI CELLS #/AREA URNS LPF: ABNORMAL /LPF
PH UR STRIP: 6.5 PH (ref 5–7)
RBC #/AREA URNS AUTO: >100 /HPF
SOURCE: ABNORMAL
SP GR UR STRIP: 1.02 (ref 1–1.03)
UROBILINOGEN UR STRIP-ACNC: 0.2 EU/DL (ref 0.2–1)
WBC #/AREA URNS AUTO: ABNORMAL /HPF

## 2018-10-22 PROCEDURE — 87086 URINE CULTURE/COLONY COUNT: CPT | Performed by: FAMILY MEDICINE

## 2018-10-22 PROCEDURE — 99214 OFFICE O/P EST MOD 30 MIN: CPT | Mod: 25 | Performed by: FAMILY MEDICINE

## 2018-10-22 PROCEDURE — 20610 DRAIN/INJ JOINT/BURSA W/O US: CPT | Mod: RT | Performed by: FAMILY MEDICINE

## 2018-10-22 PROCEDURE — 81001 URINALYSIS AUTO W/SCOPE: CPT | Performed by: FAMILY MEDICINE

## 2018-10-22 RX ORDER — ROPINIROLE 0.25 MG/1
0.25 TABLET, FILM COATED ORAL 2 TIMES DAILY PRN
Qty: 180 TABLET | Refills: 1 | Status: SHIPPED | OUTPATIENT
Start: 2018-10-22 | End: 2018-12-31

## 2018-10-22 RX ORDER — SULFAMETHOXAZOLE/TRIMETHOPRIM 800-160 MG
1 TABLET ORAL 2 TIMES DAILY
Qty: 14 TABLET | Refills: 0 | Status: SHIPPED | OUTPATIENT
Start: 2018-10-22 | End: 2018-10-31

## 2018-10-22 NOTE — PROGRESS NOTES
SUBJECTIVE:   Dimple Oviedo is a 85 year old female who presents to clinic today for the following health issues:      Musculoskeletal problem/pain      Duration: started hurting again last week     Description  Location: right knee    Intensity:  mild    Accompanying signs and symptoms: swelling and lymphedema     History  Previous similar problem: YES  Previous evaluation:  x-ray    Precipitating or alleviating factors:  Trauma or overuse: no   Aggravating factors include: walking and compression stockings     Therapies tried and outcome: compression socks and tylenol arthritis     URINARY TRACT SYMPTOMS      Duration: last few days: she had stents put in her kidneys and will have surgery to take them out 11/13/2018    Description  dysuria, frequency, urgency, hematuria, incontinence     Intensity:  mild    Accompanying signs and symptoms:  Fever/chills: no   Flank pain no   Nausea and vomiting: no   Vaginal symptoms: none  Abdominal/Pelvic Pain: no     History  History of frequent UTI's: YES  History of kidney stones: YES- 15 years ago   Sexually Active: no   Possibility of pregnancy: No    Precipitating or alleviating factors: None    Therapies tried and outcome: opiod for a week and tylenol arthritis  Outcome: not helpful    Had a kidney stent in oct for her hematuria.   - initially had improvement in symptoms but now it is back and she is having some bloody and painful urination.  Will be having nephrectomy.     Having restless leg syndrome - taking bid requip.     Problem list and histories reviewed & adjusted, as indicated.  Additional history: as documented    Labs reviewed in EPIC    Reviewed and updated as needed this visit by clinical staff       Reviewed and updated as needed this visit by Provider           Social History     Social History     Marital status:      Spouse name:      Number of children: 2     Years of education: N/A     Occupational History      Retired     Social  History Main Topics     Smoking status: Never Smoker     Smokeless tobacco: Never Used     Alcohol use No      Comment: 6 times per year-one drink     Drug use: No     Sexual activity: Yes     Partners: Male     Other Topics Concern      Service No     Blood Transfusions No     Exercise Yes     curves     Seat Belt Yes     Self-Exams Yes     Parent/Sibling W/ Cabg, Mi Or Angioplasty Before 65f 55m? No     Social History Narrative    December 7, 2009    Balanced Diet - Yes    Osteoporosis Prevention Measures - Dairy servings per day: 2 and Medication/Supplements (See current meds)    Regular Exercise -  Yes Describe curves, walking    Dental Exam - YES - Date: 10/2009    Eye Exam - YES - Date: 2008    Self Breast Exam - Yes    Abuse: Current or Past (Physical, Sexual or Emotional)- No    Do you feel safe in your environment - Yes    Guns stored in the home - No    Sunscreen used - Yes    Seatbelts used - Yes    Lipids -  YES - Date: 11/24/2009    Glucose -  YES - Date: 11/24/2009    Colon Cancer Screening - Colonoscopy 11/18/2005(date completed)    Hemoccults - YES - Date: 10/12/2004    Pap Test -  YES - Date: 09/22/2004    Do you have any concerns about STD's -  No    Mammography - YES - Date: 11/24/2009    DEXA - YES - Date: 11/20/2008    Immunizations reviewed and up to date - Yes    PAULETTE Spencer CMA                 Allergies   Allergen Reactions     No Known Drug Allergies      Patient Active Problem List   Diagnosis     Esophageal reflux     Restless leg syndrome     heat intolerance     Goiter     Disorder of bone and cartilage     Other psoriasis     perirectal cyst     Malignant melanoma of skin of trunk, except scrotum (H)     Nontoxic multinodular goiter     Hyperlipidemia LDL goal <130     Hypertension goal BP (blood pressure) < 140/90     Urinary incontinence     Osteoarthritis of left knee     Hip arthritis     Polymyalgia rheumatica (H)     High risk medication use     Shoulder pain     Impaired  "fasting blood sugar     Chronic bilateral low back pain without sciatica     Obesity, unspecified obesity severity, unspecified obesity type     Iron deficiency anemia, unspecified iron deficiency anemia type     NSTEMI (non-ST elevated myocardial infarction) (H)     Stress-induced cardiomyopathy     A-fib (H)     Anxiety     Chronic systolic heart failure (H)     Urothelial carcinoma (H)     Hyperthyroidism     Restless legs syndrome     CRESENCIO (acute kidney injury) (H)     Hydronephrosis     Reviewed medications, social history and  past medical and surgical history.    Review of system: for general, respiratory, CVS, GI and psychiatry negative except for noted above.     EXAM:  /70 (BP Location: Right arm, Patient Position: Sitting, Cuff Size: Adult Regular)  Pulse 84  Temp 97.1  F (36.2  C) (Oral)  Resp 16  Ht 4' 9\" (1.448 m)  Wt 143 lb (64.9 kg)  SpO2 97%  BMI 30.94 kg/m2  Constitutional: healthy, alert and no distress   Psychiatric: mentation appears normal and affect normal/bright  Right knee - medial and lateral joint line tenderness.      Discussed options for treatment of knee OA including oral medication, joint injection, synvisc and surgery. Pt is failing current oral meds and would like injection today. We discussed complications with current procedure including the risks of infection of the joint, bleeding in joint, pain flare up, and not being effective.     RT knee corticosteroid injection  After risks, benefits and complications of steroid injection were discussed with the patient he elected to proceed.  Using sterile technique, the area was first prepped with alcohol swab and chlorprep..  Topical ethyl chloride was used as local.  A 22 gauge, 1 1/2 inch needle was used to inject 40 mg kenalong(1ml) and 4cc of 1% lidocaine each into the knee joint using an anterolateral joint line approach on the RT side.  Pt.  tolerated the procedure well without complications.  Post injection " instructions given.          ASSESSMENT / PLAN:  (R30.0) Dysuria  (primary encounter diagnosis)  Comment: She recently has had a stent in her UA could certainly be abnormal due to that.  She will be having a nephrectomy and I will send her home with antibiotics while awaiting for urine culture.  Most likely she may not need to use any antibiotics.  She understood.  Plan: UA reflex to Microscopic, Urine Microscopic,         sulfamethoxazole-trimethoprim (BACTRIM         DS/SEPTRA DS) 800-160 MG per tablet, Urine         Culture Aerobic Bacterial            (G25.81) Restless legs syndrome  Comment:  okay to increase it to 2 pills/day. Continue iron supplement.   Plan: rOPINIRole (REQUIP) 0.25 MG tablet             (M25.561,  G89.29) Chronic pain of right knee  Comment:  Right knee injection done. Reviewed ER notes for her bakers cyst.   Plan: see above.     Follow up: with urologist as scheduled.     The above note was dictated using voice recognition. Although reviewed after completion, some word and grammatical error may remain .

## 2018-10-23 LAB
BACTERIA SPEC CULT: NO GROWTH
Lab: NORMAL
SPECIMEN SOURCE: NORMAL

## 2018-10-23 RX ORDER — TRIAMCINOLONE ACETONIDE 40 MG/ML
40 INJECTION, SUSPENSION INTRA-ARTICULAR; INTRAMUSCULAR ONCE
Qty: 1 ML | Refills: 0 | OUTPATIENT
Start: 2018-10-23 | End: 2019-02-13

## 2018-10-25 DIAGNOSIS — E04.2 NONTOXIC MULTINODULAR GOITER: ICD-10-CM

## 2018-10-25 RX ORDER — METHIMAZOLE 5 MG/1
5 TABLET ORAL EVERY MORNING
Qty: 90 TABLET | Refills: 1 | Status: SHIPPED | OUTPATIENT
Start: 2018-10-25 | End: 2018-10-31

## 2018-10-25 NOTE — TELEPHONE ENCOUNTER
" methimazole (TAPAZOLE) 5 MG tablet   Last Written Prescription Date:  7/20/18  Last Fill Quantity: 90,   # refills: 1  Last Office Visit :8/14/18  Future Office visit:  12/4/18    \"Because she developed hyperthyroidism with symptoms, she was put back on methimazole.  This dose was reduced from 5 mg 3 times daily to 5 mg twice daily 3 days ago.  With the addition of the methimazole, her symptoms of tremor and palpitations are essentially resolved\"      Routing refill request to provider for review/approval because:  Not on protocol. Pt states dose now 2 tabs daily.         "

## 2018-10-25 NOTE — TELEPHONE ENCOUNTER
[facility] Call Center    Phone Message    May a detailed message be left on voicemail: yes    Reason for Call: Medication Refill Request    Has the patient contacted the pharmacy for the refill? Yes - but requesting a change in pharmacy to Mail Order with [facility]  Name of medication being requested: methimazole (TAPAZOLE) 5 MG tablet - Per Pt, dosage was change to 2 pills a day by Dr. Meza at last visit and requesting 90 day supply   Provider who prescribed the medication: Dr. Meza  Pharmacy: Pt requesting be sent to [facility], phone: [phone], Member ID: [id number]  Date medication is needed: In next week or so, having surgery and hoping to get early because will not be able to go out to pick-up after upcoming surgery. Please call Pt to confirm when order has been placed with pharmacy.     Action Taken: Message routed to:  [facility]

## 2018-10-31 DIAGNOSIS — E04.2 NONTOXIC MULTINODULAR GOITER: ICD-10-CM

## 2018-10-31 RX ORDER — METHIMAZOLE 5 MG/1
5 TABLET ORAL 2 TIMES DAILY
Qty: 180 TABLET | Refills: 3 | Status: SHIPPED | OUTPATIENT
Start: 2018-10-31 | End: 2018-11-08

## 2018-10-31 NOTE — PROGRESS NOTES
[facility] medication history for October 31, 2018 is complete.    See Epic admission navigator for prior to admission medications.   Operating room staff will still need to confirm medications and last dose information on day of surgery.     Medication history interview sources:  Patient Interview    Changes made to PTA medication list (reason)  Added: None    Deleted:   -- diazepam - not taking  -- bactrim - finished course  -- senokot - not taking    Changed:   -- methimazole increased to BID    Additional medication history information (including reliability of information, actions taken by pharmacist):    -- No recent (within 30 days) course of antibiotics  -- No recent (within 30 days) course of steroids  -- No recent (within 30 days) chronic daily medications stopped   -- Patient declines being on any other prescription or over-the-counter medications    Prior to Admission medications    Medication Sig Last Dose Taking? Auth Provider   alendronate (FOSAMAX) 70 MG tablet TAKE 1 TABLET EVERY 7 DAYS AT LEAST 60 MINUTES BEFORE BREAKFAST AS DIRECTED. SEE PACKAGE FOR ADDITIONAL INSTRUCTIONS  Patient taking differently: TAKE 1 TABLET EVERY 7 DAYS AT LEAST 60 MINUTES BEFORE BREAKFAST AS DIRECTED. (Tuesdays0 Taking Yes Pop Zapien MD   aspirin 81 MG tablet Take 81 mg by mouth every evening  Taking Yes Unknown, Entered By History   Calcium Citrate-Vitamin D (CALCIUM + D PO) Take 1 tablet by mouth 2 times daily  Taking Yes Reported, Patient   colestipol (COLESTID) 1 g tablet Take 1 g by mouth 2 times daily TAKE OTHER MEDS 1 HOUR BEFORE OR 4 HOURS AFTER COLESID Taking Yes Unknown, Entered By History   cycloSPORINE (RESTASIS) 0.05 % ophthalmic emulsion Place 1 drop into both eyes 2 times daily Taking Yes Unknown, Entered By History   ferrous sulfate 325 (65 Fe) MG TBEC EC tablet Take 325 mg by mouth daily Taking Yes Unknown, Entered By History   furosemide (LASIX) 20 MG tablet Take 1  tablet (20 mg) by mouth every morning Taking Yes Pop Zapien MD   irbesartan (AVAPRO) 300 MG tablet TAKE 1 TABLET EVERY DAY  Patient taking differently: Take 300 mg by mouth every morning  Taking Yes Pop Zapien MD   lovastatin (MEVACOR) 40 MG tablet TAKE 1 TABLET AT BEDTIME (HYPERLIPIDEMIA LDL GOAL BELOW 130) Taking Yes Pop Zapien MD   Melatonin 10 MG TABS tablet Take 10 mg by mouth as needed for sleep  Taking Yes Reported, Patient   methimazole (TAPAZOLE) 5 MG tablet Take 1 tablet (5 mg) by mouth every morning  Patient taking differently: Take 5 mg by mouth 2 times daily  Taking Yes Rosangela De Jesus MD   Omega-3 Fatty Acids (FISH OIL) 500 MG CAPS Take 1 capsule by mouth 2 times daily  Taking Yes Unknown, Entered By History   omeprazole (PRILOSEC) 20 MG CR capsule Take 1 capsule (20 mg) by mouth daily  Patient taking differently: Take 20 mg by mouth every morning  Taking Yes Pop Zapien MD   Probiotic Product (PROBIOTIC ADVANCED PO) Take 1 capsule by mouth every morning  Taking Yes Reported, Patient   propranolol (INDERAL LA) 60 MG 24 hr capsule Take 1 capsule (60 mg) by mouth daily  Patient taking differently: Take 60 mg by mouth every morning  Taking Yes Pop Zapien MD   rOPINIRole (REQUIP) 0.25 MG tablet Take 1 tablet (0.25 mg) by mouth 2 times daily as needed (restless leg syndrome) Taking Yes Pop Zapien MD   sertraline (ZOLOFT) 100 MG tablet Take 1 tablet (100 mg) by mouth every morning Taking Yes Pop Zapien MD   traZODone (DESYREL) 50 MG tablet TAKE 1/2 TABLET(25 MG)  AT BEDTIME Taking Yes Pop Zapien MD   acetaminophen (TYLENOL) 650 MG CR tablet Take 650 mg by mouth as needed Not Taking  Unknown, Entered By History   HYDROcodone-acetaminophen (NORCO) 5-325 MG per tablet Take 1 tablet by mouth every 6 hours as needed for severe pain  Patient not taking: Reported on 10/31/2018 Not Taking   Michael Tyson MD   nitroGLYcerin (NITROSTAT) 0.4 MG sublingual tablet For chest pain place 1 tablet under the tongue every 5 minutes for 3 doses. If symptoms persist 5 minutes after 1st dose call 911.  Patient not taking: Reported on 10/31/2018 Not Taking  Roxi Lowe APRN CNP   order for DME Equipment being ordered: Knee high compression for B LE 20-30mmHg, Velcro units for night time (consider hybrid sock as foot swelling is less than leg swelling), Donning device   Bel Mckeon MD         Medication history completed by: Eric Oliver [facility]

## 2018-11-01 ENCOUNTER — ANESTHESIA EVENT (OUTPATIENT)
Dept: SURGERY | Facility: CLINIC | Age: 83
DRG: 657 | End: 2018-11-01
Payer: MEDICARE

## 2018-11-01 ENCOUNTER — OFFICE VISIT (OUTPATIENT)
Dept: SURGERY | Facility: CLINIC | Age: 83
End: 2018-11-01
Payer: MEDICARE

## 2018-11-01 VITALS
BODY MASS INDEX: 30.35 KG/M2 | TEMPERATURE: 97.9 F | DIASTOLIC BLOOD PRESSURE: 52 MMHG | HEIGHT: 57 IN | WEIGHT: 140.7 LBS | OXYGEN SATURATION: 97 % | HEART RATE: 60 BPM | SYSTOLIC BLOOD PRESSURE: 151 MMHG

## 2018-11-01 DIAGNOSIS — C68.9 UROTHELIAL CARCINOMA (H): ICD-10-CM

## 2018-11-01 DIAGNOSIS — E05.90 HYPERTHYROIDISM: ICD-10-CM

## 2018-11-01 DIAGNOSIS — D50.9 IRON DEFICIENCY ANEMIA, UNSPECIFIED IRON DEFICIENCY ANEMIA TYPE: ICD-10-CM

## 2018-11-01 DIAGNOSIS — Z01.818 PREOP EXAMINATION: Primary | ICD-10-CM

## 2018-11-01 DIAGNOSIS — C68.9 UROTHELIAL CANCER (H): ICD-10-CM

## 2018-11-01 DIAGNOSIS — E05.00 GRAVES DISEASE: ICD-10-CM

## 2018-11-01 LAB
ANION GAP SERPL CALCULATED.3IONS-SCNC: 8 MMOL/L (ref 3–14)
BUN SERPL-MCNC: 20 MG/DL (ref 7–30)
CALCIUM SERPL-MCNC: 9.2 MG/DL (ref 8.5–10.1)
CHLORIDE SERPL-SCNC: 102 MMOL/L (ref 94–109)
CO2 SERPL-SCNC: 28 MMOL/L (ref 20–32)
CREAT SERPL-MCNC: 0.87 MG/DL (ref 0.52–1.04)
ERYTHROCYTE [DISTWIDTH] IN BLOOD BY AUTOMATED COUNT: 14.4 % (ref 10–15)
FERRITIN SERPL-MCNC: 36 NG/ML (ref 8–252)
GFR SERPL CREATININE-BSD FRML MDRD: 62 ML/MIN/1.7M2
GLUCOSE SERPL-MCNC: 88 MG/DL (ref 70–99)
HCT VFR BLD AUTO: 38.7 % (ref 35–47)
HGB BLD-MCNC: 11.8 G/DL (ref 11.7–15.7)
IRON SATN MFR SERPL: 12 % (ref 15–46)
IRON SERPL-MCNC: 46 UG/DL (ref 35–180)
MCH RBC QN AUTO: 26.6 PG (ref 26.5–33)
MCHC RBC AUTO-ENTMCNC: 30.5 G/DL (ref 31.5–36.5)
MCV RBC AUTO: 87 FL (ref 78–100)
PLATELET # BLD AUTO: 377 10E9/L (ref 150–450)
POTASSIUM SERPL-SCNC: 5.3 MMOL/L (ref 3.4–5.3)
RBC # BLD AUTO: 4.43 10E12/L (ref 3.8–5.2)
SODIUM SERPL-SCNC: 138 MMOL/L (ref 133–144)
T3 SERPL-MCNC: 74 NG/DL (ref 60–181)
T4 FREE SERPL-MCNC: 0.71 NG/DL (ref 0.76–1.46)
TIBC SERPL-MCNC: 388 UG/DL (ref 240–430)
TSH SERPL DL<=0.005 MIU/L-ACNC: <0.01 MU/L (ref 0.4–4)
WBC # BLD AUTO: 8.7 10E9/L (ref 4–11)

## 2018-11-01 PROCEDURE — 84480 ASSAY TRIIODOTHYRONINE (T3): CPT | Performed by: INTERNAL MEDICINE

## 2018-11-01 ASSESSMENT — LIFESTYLE VARIABLES: TOBACCO_USE: 0

## 2018-11-01 ASSESSMENT — ENCOUNTER SYMPTOMS: DYSRHYTHMIAS: 1

## 2018-11-01 NOTE — ANESTHESIA PREPROCEDURE EVALUATION
Anesthesia Pre-Procedure Evaluation    Patient: Dimple Oviedo   MRN:     [id number] Gender:   female   Age:    85 year old :      1933        Preoperative Diagnosis: Urothelial Cancer    Procedure(s):  Right DaVinci Assisted Nephroureterectomy  Possible Davinci Lymphadenectomy        Past Medical History:   Diagnosis Date     Calculus of kidney      Esophageal reflux      GERD (gastroesophageal reflux disease)      Hyperlipidemia LDL goal <130 2010     Malignant melanoma of skin of trunk, except scrotum (H)      Nonspecific abnormal finding     has living will  -      Nontoxic multinodular goiter     no further eval /tx rec per pt     Osteopenia      Other psoriasis      Personal history of colonic polyps      PMR (polymyalgia rheumatica) (H)      Stress-induced cardiomyopathy      Undiagnosed cardiac murmurs      Unspecified constipation      Unspecified essential hypertension      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      Past Surgical History:   Procedure Laterality Date     BIOPSY       C NONSPECIFIC PROCEDURE      colonoscopy polyp repeat      COLONOSCOPY       COMBINED CYSTOSCOPY, INSERT STENT URETER(S) Bilateral 2018    Procedure: COMBINED CYSTOSCOPY, INSERT STENT URETER(S);  Cystoscopy Bilateral ureteral Stent Placement.;  Surgeon: Justin Henry MD;  Location: [facility] OR     COMBINED CYSTOSCOPY, RETROGRADES, URETEROSCOPY, INSERT STENT Bilateral 4/3/2018    Procedure: COMBINED CYSTOSCOPY, RETROGRADES, URETEROSCOPY, INSERT STENT;;  Surgeon: Stuart King MD;  Location: [facility] OR     COMBINED CYSTOSCOPY, RETROGRADES, URETEROSCOPY, INSERT STENT Bilateral 9/10/2018    Procedure: COMBINED CYSTOSCOPY, RETROGRADES, URETEROSCOPY, INSERT STENT;  Cystoscopy, Bilateral Ureteroscopy, Bladder Biopsies, Retrogram Pyelograms, Ureteral Washings and brushings, cysview;  Surgeon: Stuart King MD;  Location: [facility] OR     CYSTOSCOPY, BIOPSY BLADDER INSTILL OPTICAL AGENT N/A  4/3/2018    Procedure: CYSTOSCOPY, BIOPSY BLADDER INSTILL OPTICAL AGENT;  Cystoscopy, Blue Light Cystoscopy, Bladder Biopsies, Bilateral Selective ureteral washings for Cytology, Bilateral Retrograde Pyelograms, Bilateral Ureteroscopy;  Surgeon: Stuart King MD;  Location: [facility] OR     CYSTOSCOPY, BIOPSY BLADDER, COMBINED N/A 2/19/2018    Procedure: COMBINED CYSTOSCOPY, BIOPSY BLADDER;  Cystoscopy, Bladder Biopsy;  Surgeon: Kenna La MD;  Location: [facility] OR     ENDOSCOPIC ULTRASOUND LOWER GASTROINTESTIONAL TRACT (GI) N/A 10/30/2015    Procedure: ENDOSCOPIC ULTRASOUND LOWER GASTROINTESTIONAL TRACT (GI);  Surgeon: Daniel Jean-Baptiste MD;  Location: [facility] OR     EYE SURGERY  12/4/17     LAPAROSCOPIC CHOLECYSTECTOMY WITH CHOLANGIOGRAMS N/A 11/1/2015    Procedure: LAPAROSCOPIC CHOLECYSTECTOMY WITH CHOLANGIOGRAMS;  Surgeon: Tonie Warren MD;  Location: [facility] OR     SURGICAL HISTORY OF -   1996    malignant melanoma     SURGICAL HISTORY OF -   1968    thyroid nodule     SURGICAL HISTORY OF -       D & C          Anesthesia Evaluation     . Pt has had prior anesthetic. Type: General    History of anesthetic complications   - PONV        ROS/MED HX    ENT/Pulmonary:     (+)JESS risk factors snores loudly, hypertension, , . .   (-) tobacco use   Neurologic:  - neg neurologic ROS   (+)other neuro tremors, RLS    Cardiovascular: Comment: CP episode 12/13/17 with elevated troponin. 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 Angiogram no CAD. F/U ECHO Chaitanya EF 65%.   AF with RVR in setting of hyperthyroidism. Spontaneous conversion. Managed with B-blocker, ACEI, statin and ASA.   On Tapaxole.  last endocrine  visit 1/2018. Xarelto DC'd 2/2 bleeding.         (+) hypertension----. Taking blood thinners Pt has received instructions: Instructions Given to patient: Planned 7 day hold for surgery. CHF etiology: Stress induced cardiomyopathy 12/13/17 Last EF: 55-60% date: 7/2018 . . :. dysrhythmias a-fib, valvular  problems/murmurs type: AS and AI . Previous cardiac testing Echodate:7/2018results:date: results:ECG reviewed date:9/27/18 results:SR, LADCath date: 12/2017 results:          METS/Exercise Tolerance: Comment: METS<4 1 - Eating, dressing   Hematologic:     (+) Anemia, -     (-) History of Transfusion   Musculoskeletal: Comment: Osteopenia    Osteoarthritis in knees bilaterally.  - neg musculoskeletal ROS       GI/Hepatic:     (+) GERD Asymptomatic on medication, cholecystitis/cholelithiasis (s/p cholecystectomy),       Renal/Genitourinary:     (+) chronic renal disease, type: CRI, Nephrolithiasis , Pt does not require dialysis, Pt has no history of transplant, Other Renal/ Genitourinary, Hematuria      Endo:     (+) thyroid problem (Graves disease)  hyperthyroidism, Obesity (BMI 31), .      Psychiatric:     (+) psychiatric history depression      Infectious Disease:  - neg infectious disease ROS       Malignancy:   (+) Malignancy History of Other  Skin CA status post Surgery, Other CA Urothelial cancer Active status post Surgery         Other:    (+) C-spine cleared: N/A, no H/O Chronic Pain,other significant disability Other (comment)                       PHYSICAL EXAM:   Mental Status/Neuro: Age Appropriate   Airway: Facies: Feasible  Mallampati: II  Mouth/Opening: Full  Neck ROM: Limited   Respiratory: Auscultation: CTAB     Resp. Rate: Normal     Resp. Effort: Normal      CV: Rhythm: Regular  Heart: Normal Sounds   Comments:      Dental:  Dental Comments: Upper left front tooth capped                Lab Results   Component Value Date    WBC 7.5 09/14/2018    HGB 9.1 (L) 09/14/2018    HCT 29.3 (L) 09/14/2018     09/14/2018    CRP <2.9 10/06/2017    SED 25 10/06/2017     09/19/2018    POTASSIUM 4.4 09/19/2018    CHLORIDE 103 09/19/2018    CO2 28 09/19/2018    BUN 17 09/19/2018    CR 0.70 09/19/2018     (H) 09/19/2018    SHREYA 8.9 09/19/2018    PHOS 2.4 (L) 12/13/2017    MAG 2.1 01/16/2018     "ALBUMIN 3.1 (L) 09/11/2018    PROTTOTAL 6.5 (L) 09/11/2018    ALT 92 (H) 09/11/2018     (H) 09/11/2018    ALKPHOS 129 09/11/2018    BILITOTAL 0.7 09/11/2018    LIPASE 100 10/31/2015    AMYLASE 44 10/29/2015    INR 1.71 (H) 01/16/2018    TSH <0.01 (L) 09/27/2018    T4 1.71 (H) 09/27/2018    T3 186 (H) 09/27/2018    HCGS Negative 12/13/2017       Preop Vitals  BP Readings from Last 3 Encounters:   10/22/18 120/70   10/13/18 136/46   09/27/18 116/67    Pulse Readings from Last 3 Encounters:   10/22/18 84   10/13/18 83   09/27/18 76      Resp Readings from Last 3 Encounters:   10/22/18 16   10/13/18 16   09/19/18 16    SpO2 Readings from Last 3 Encounters:   10/22/18 97%   10/13/18 96%   09/27/18 97%      Temp Readings from Last 1 Encounters:   10/22/18 97.1  F (36.2  C) (Oral)    Ht Readings from Last 1 Encounters:   10/22/18 1.448 m (4' 9\")      Wt Readings from Last 1 Encounters:   10/22/18 64.9 kg (143 lb)    Estimated body mass index is 30.94 kg/(m^2) as calculated from the following:    Height as of 10/22/18: 1.448 m (4' 9\").    Weight as of 10/22/18: 64.9 kg (143 lb).     LDA:            Assessment:   ASA SCORE: 3    NPO Status: > 6 hours since completed [employer]   Documentation: H&P complete; Preop Testing complete; Consents complete   Proceeding: Proceed without further delay  Tobacco Use:  NO Active use of Tobacco/UNKNOWN Tobacco use status     Plan:   Anes. Type:  General      Induction:  IV (Standard)   Airway: Oral ETT   Access/Monitoring: PIV; 2nd PIV; A-Line   Maintenance: Balanced   Emergence: Procedure Site        Postop Pain/Sedation Strategy:  Standard-Options: Opioids PRN     PONV Management:  Adult Risk Factors: Female, Non-Smoker, Postop Opioids  Prevention: Ondansetron; Dexamethasone     CONSENT: Direct conversation   Plan and risks discussed with: Patient   Blood Products: Consented (ALL Blood Products)     Comments for Plan/Consent:  Discussed risks of injury to finger from keeping " ring on during surgery. Patient verbalizes understanding of risks but would still like to keep ring on against my recommendations.                   PAC Discussion and Assessment    ASA Classification: 3  Case is suitable for: [place]  Anesthetic techniques and relevant risks discussed: GA  Invasive monitoring and risk discussed: No  Types:   Possibility and Risk of blood transfusion discussed: Yes  NPO instructions given:   Additional anesthetic preparation and risks discussed:   Needs early admission to pre-op area:   Other:     PAC Resident/NP Anesthesia Assessment:  Dimple Oviedo is a 85 year old female who presents for pre-operative H & P in preparation for Right Robotic Nephroureterectomy and possible lymph node dissection with Dr. King on 11/13/18 at [facility].  She has the following specific operative considerations:   RCRI: 6.6% risk of major adverse cardiac event.  JESS # of risks 3/8 = intermediate  VTE risk:  3%.  Increased VTE risk: Recommend use of mechanical/chemical VTE prophylaxis in the sandip- and postoperative periods.    PONV risk 4. If 3 or > anti emetic intervention recommended with two or more meds  Post-op delirium risk: elevated given age     --Urothelial carcinoma, s/p multiple procedures. Above procedure now planned.    --HLD. HYPERTENSION. Will hold Lasix and irbesartan but take Propranolol on DOS. ASA 81 mg daily with planned 7 day hold for surgery. H/O stress cardiomyopathy with recovered EF.  EF 55-60% (echo 7/2018). Coronary angiogram 12/2017 with normal coronary arteries.   H/O atrial fibrillation with rapid ventricular response (1/2018). Thought to be r/t to fluid overload. Initially anticoagulated but  Xarelto stopped after first dose given significant hematuria.  Holter monitor in May WNL. Moderate to severe aortic stenosis/insufficiency. Right carotid bruit on exam today.  Carotid US neg above. Limited activity, walks with  assistive device.    --Nonsmoker. No pulmonary symptoms.    --Anemia labs to be updated today with consideration for iron infusions prior to surgery. Patient and family agree. Will arrange after lab results reviewed.   --GERD. Will take Omeprazole on DOS.    --Graves disease, take Tapazole as prescribed DOS.  Propranolol also for thyroid disease.    --Depression. Will take Sertraline on DOS.    --RLS. Will take Requip on DOS.   Type and screen drawn today.     Patient discussed with Dr. Carpenter.   Addendum: After labs returned, it was decided that iron infusions will not be needed. Patient aware of results.     Reviewed and Signed by PAC Mid-Level Provider/Resident  Mid-Level Provider/Resident: NELSON Mtz, CNS  Date: 11/1/18  Time: 1:36pm    Attending Anesthesiologist Anesthesia Assessment:  85 year old for robotic nephroureterectomy and possible LND in management of urethelial cancer. .Chart reviewed, patient seen and evaluated; agree with above assessment. Patient has episode of stress cardiomyopathy in the distant past, but had echo 2018 that showed normal EF with mild aortic stenosis (velocity2.5) and moderate AI. Stable HTN GERD. She has been quite anemic in the past, but hgb today is 11.98, so no intervention needed.    Patient is appropriate for the planned procedure without further workup or medical management change. The final anesthesia plan will be determined by the physician anesthesiologist caring for the patient on the day of surgery.      Reviewed and Signed by PAC Anesthesiologist  Anesthesiologist: thai  Date: 11/1/2018  Time:   Pass/Fail: Pass  Disposition:     PAC Pharmacist Assessment:        Pharmacist:   Date:   Time:        NELSON Kwok

## 2018-11-01 NOTE — MR AVS SNAPSHOT
After Visit Summary   11/1/2018    Dimple Oviedo    MRN: [id number]           Patient Information     Date Of Birth          6/23/1933        Visit Information        Provider Department      11/1/2018 12:00 PM Pharmacist, Nicole Acevedo [facility]        Today's Diagnoses     Preop examination    -  1       Follow-ups after your visit        Your next 10 appointments already scheduled     Nov 01, 2018 12:00 PM CDT   (Arrive by 11:45 AM)   PAC Pharmacist with  Pac Pharmacist   [facility] ([facility])    [address] [place] [id number]   [phone]            Nov 01, 2018 12:30 PM CDT   LAB with  LAB   [facility] ([facility])    [address] [place] [id number] [phone]           Please do not eat 10-12 hours before your appointment if you are coming in fasting for labs on lipids, cholesterol, or glucose (sugar). This does not apply to pregnant women. Water, hot tea and black coffee (with nothing added) are okay. Do not drink other fluids, diet soda or chew gum.            Nov 01, 2018  1:00 PM CDT   (Arrive by 12:45 PM)   PAC EVALUATION with NELSON Kwok   [facility] ([facility])    [address] [place] [id number] [phone]            Nov 13, 2018   Procedure with Stuart King MD   [facility] [place], Same Day Surgery (--)    [address] [facility] [address]   [phone]            Nov 26, 2018  1:30 PM CST   US THYROID with UCUS2    [facility] ([facility])    [address] [place] [id number] [phone]           How do I prepare for my exam? (Food and drink instructions) No Food and Drink Restrictions.  How do I prepare for my exam? (Other instructions) You  do not need to do anything special to prepare for your exam.  What should I wear: Wear comfortable clothes.  How long does the exam take: Most ultrasounds take 30 to 60 minutes.  What should I bring: Bring a list of your medicines, including vitamins, minerals and over-the-counter drugs. It is safest to leave personal items at home.  Do I need a :  No  is needed.  What do I need to tell my doctor: Tell your doctor about any allergies you may have.  What should I do after the exam: No restrictions, You may resume normal activities.  What is this test: An ultrasound uses sound waves to make pictures of the body. Sound waves do not cause pain. The only discomfort may be the pressure of the wand against your skin or full bladder.  Who should I call with questions: If you have any questions, please call the Imaging Department where you will have your exam. Directions, parking instructions, and other information is available on our website, [email or web address].            Nov 26, 2018  2:15 PM CST   LAB with  LAB   [facility] ([facility])    [address] [place] [address]  [address] [facility] [address] [phone]           Please do not eat 10-12 hours before your appointment if you are coming in fasting for labs on lipids, cholesterol, or glucose (sugar). This does not apply to pregnant women. Water, hot tea and black coffee (with nothing added) are okay. Do not drink other fluids, diet soda or chew gum.            Nov 27, 2018 11:30 AM CST   (Arrive by 11:15 AM)   Post-Op with ALYSSA Mejia   [facility]rology and Inst for Prostate and Urologic Cancers ([facility])    [address] [place] [address]  [address] [facility] [id number] [phone]            Dec 04, 2018  1:30 PM CST   (Arrive by 1:15 PM)   RETURN ENDOCRINE with Dhara Meza MD   [facility] Endocrinology ([facility])    [address] [place] [address]  [facility] [address] [phone]            Dec 06, 2018 11:00 AM CST   (Arrive by 10:45 AM)   Post-Op with Stuart King MD   [facility] ([facility])    [address] [facility]  4th [facility] [id number] [phone]              Who to contact     Please call your clinic at [phone] to:    Ask questions about your health    Make or cancel appointments    Discuss your medicines    Learn about your test results    Speak to your doctor            Additional Information About Your Visit        [employer] Information     [facility] gives you secure access to your electronic health record. If you see a primary care provider, you can also send messages to your care team and make appointments. If you have questions, please call your primary care clinic.  If you do not have a primary care provider, please call [phone] and they will assist you.      [facility] [employer] is an electronic gateway that provides easy, online access to your medical records. With [facility] [employer], you can request a clinic appointment, read your test results, renew a prescription or communicate with your care team.     To access your existing account, please contact your [facility] or call [phone] for assistance.        Care EveryWhere ID     This is your Care EveryWhere ID. This could be used by other organizations to access your [facility] medical records  [id number]         Blood Pressure from Last 3 Encounters:   10/22/18 120/70   10/13/18 136/46   09/27/18 116/67    Weight from Last 3 Encounters:   10/22/18 64.9 kg (143 lb)   10/13/18 64.9 kg (143 lb 1.6 oz)   09/27/18 64 kg (141 lb)              Today, you had the following     No orders found for display         Today's Medication Changes          These changes are accurate as of 10/31/18  1:13 PM.  If you have any questions, ask your nurse or doctor.                These medicines have changed or have updated prescriptions.        Dose/Directions    alendronate 70 MG tablet   Commonly known as:  FOSAMAX   This may have changed:  See the new instructions.   Used for:  High risk medication use, Osteopenia        TAKE 1 TABLET EVERY 7 DAYS AT LEAST 60 MINUTES BEFORE BREAKFAST AS DIRECTED. SEE PACKAGE FOR ADDITIONAL INSTRUCTIONS   Quantity:  12 tablet   Refills:  0       irbesartan 300 MG tablet   Commonly known as:  AVAPRO   This may have changed:    - how much to take  - how to take this  - when to take this  - additional instructions   Used for:  Essential hypertension with goal blood pressure less than 140/90        TAKE 1 TABLET EVERY DAY   Quantity:  90 tablet   Refills:  2       methimazole 5 MG tablet   Commonly known as:  TAPAZOLE   This may have changed:  when to take this   Used for:  Nontoxic multinodular goiter        Dose:  5 mg   Take 1 tablet (5 mg) by mouth every morning   Quantity:  90 tablet   Refills:  1       omeprazole 20 MG CR capsule   Commonly known as:  priLOSEC   This may have changed:  when to take this   Used for:  Gastroesophageal reflux disease without esophagitis        Dose:  20 mg   Take 1 capsule (20 mg) by mouth daily   Quantity:  180 capsule   Refills:  3       propranolol 60 MG 24 hr capsule   Commonly known as:  INDERAL LA   This may have changed:  when to take this   Used for:  Nontoxic multinodular goiter        Dose:  60 mg   Take 1 capsule (60 mg) by mouth daily   Quantity:  90 capsule   Refills:  1                Primary Care Provider Office Phone # Fax #    Pop Antonino Zapien [phone] [address] [place], [place] [id number]        Equal Access to Services     GUNNER RODRIGUEZ AH: Aleja Pollack, nadia barnard, maldonado lui [facility] [phone].    ATENCIÓN: Si habla español, tiene a sierra disposición servicios gratuitos de asistencia  lingüística. Al al [phone].    We comply with applicable federal civil rights laws and [place] laws. We do not discriminate on the basis of race, color, national origin, age, disability, sex, sexual orientation, or gender identity.            Thank you!     Thank you for choosing [facility]  for your care. Our goal is always to provide you with excellent care. Hearing back from our patients is one way we can continue to improve our services. Please take a few minutes to complete the written survey that you may receive in the mail after your visit with us. Thank you!             Your Updated Medication List - Protect others around you: Learn how to safely use, store and throw away your medicines at www.disposemymeds.org.          This list is accurate as of 10/31/18  1:13 PM.  Always use your most recent med list.                   Brand Name Dispense Instructions for use Diagnosis    acetaminophen 650 MG CR tablet    TYLENOL     Take 650 mg by mouth as needed        alendronate 70 MG tablet    FOSAMAX    12 tablet    TAKE 1 TABLET EVERY 7 DAYS AT LEAST 60 MINUTES BEFORE BREAKFAST AS DIRECTED. SEE PACKAGE FOR ADDITIONAL INSTRUCTIONS    High risk medication use, Osteopenia       aspirin 81 MG tablet      Take 81 mg by mouth every evening        CALCIUM + D PO      Take 1 tablet by mouth 2 times daily        colestipol 1 g tablet    COLESTID     Take 1 g by mouth 2 times daily TAKE OTHER MEDS 1 HOUR BEFORE OR 4 HOURS AFTER COLESID        cycloSPORINE 0.05 % ophthalmic emulsion    RESTASIS     Place 1 drop into both eyes 2 times daily        ferrous sulfate 325 (65 Fe) MG Tbec EC tablet      Take 325 mg by mouth daily        Fish Oil 500 MG Caps      Take 1 capsule by mouth 2 times daily        furosemide 20 MG tablet    LASIX    90 tablet    Take 1 tablet (20 mg) by mouth every morning    Stasis edema of both lower extremities, (HFpEF) heart failure with preserved ejection  fraction (H)       HYDROcodone-acetaminophen 5-325 MG per tablet    NORCO    10 tablet    Take 1 tablet by mouth every 6 hours as needed for severe pain        irbesartan 300 MG tablet    AVAPRO    90 tablet    TAKE 1 TABLET EVERY DAY    Essential hypertension with goal blood pressure less than 140/90       lovastatin 40 MG tablet    MEVACOR    90 tablet    TAKE 1 TABLET AT BEDTIME (HYPERLIPIDEMIA LDL GOAL BELOW 130)    Hyperlipidemia LDL goal <130       Melatonin 10 MG Tabs tablet      Take 10 mg by mouth as needed for sleep        methimazole 5 MG tablet    TAPAZOLE    90 tablet    Take 1 tablet (5 mg) by mouth every morning    Nontoxic multinodular goiter       nitroGLYcerin 0.4 MG sublingual tablet    NITROSTAT    25 tablet    For chest pain place 1 tablet under the tongue every 5 minutes for 3 doses. If symptoms persist 5 minutes after 1st dose call 911.    Elevated troponin       omeprazole 20 MG CR capsule    priLOSEC    180 capsule    Take 1 capsule (20 mg) by mouth daily    Gastroesophageal reflux disease without esophagitis       order for DME     1 each    Equipment being ordered: Knee high compression for B LE 20-30mmHg, Velcro units for night time (consider hybrid sock as foot swelling is less than leg swelling), Donning device    Lymphedema of both lower extremities       PROBIOTIC ADVANCED PO      Take 1 capsule by mouth every morning        propranolol 60 MG 24 hr capsule    INDERAL LA    90 capsule    Take 1 capsule (60 mg) by mouth daily    Nontoxic multinodular goiter       rOPINIRole 0.25 MG tablet    REQUIP    180 tablet    Take 1 tablet (0.25 mg) by mouth 2 times daily as needed (restless leg syndrome)    Restless legs syndrome       sertraline 100 MG tablet    ZOLOFT    90 tablet    Take 1 tablet (100 mg) by mouth every morning    THERON (generalized anxiety disorder)       traZODone 50 MG tablet    DESYREL    45 tablet    TAKE 1/2 TABLET(25 MG)  AT BEDTIME    Insomnia, unspecified type

## 2018-11-01 NOTE — PATIENT INSTRUCTIONS
Preparing for Your Surgery      Name:  Dimple Oviedo   MRN:  [id number]   :  1933   Today's Date:  2018     Arriving for surgery:   Surgery date:  2018  Arrival time:  6:00 am  Please come to:       [facility] [address]  [address] [place]  [address]    -   parking is available in front of the hospital from 5:15 am to 8:00 pm    -  Stop at the Information Desk in the lobby    -   Inform the information person that you are here for surgery. An escort to 3c will be provided. If you would not like an escort, please proceed to 3C on the 3rd floor. [phone]     What can I eat or drink?  -  You may have solid food or milk products until 8 hours prior to your surgery. (midnight)  -  You may have water, apple juice or 7up/Sprite until 2 hours prior to your surgery.(until 6 am arrival time)    Which medicines can I take?        Stop Aspirin, vitamins and supplements (fish oil) one week prior to surgery.      Hold Ibuprofen and Naproxen for 24 hours prior to surgery.     -  Do NOT take these medications in the morning, the day of surgery:     Iron      Furosemide      Irbesartan (avapro)           -  Please take these medications the day of surgery:     Omeprazole      Propranolol     Sertraline  (Zoloft)     Methimazole (tapazole)      How do I prepare myself?  -  Take two showers: one the night before surgery; and one the morning of surgery.         Use Scrubcare or Hibiclens to wash from neck down.  You may use your own shampoo and conditioner. No other hair products.   -  Do NOT use lotion, powder, deodorant, or antiperspirant the day of your surgery.  -  Do NOT wear any makeup, fingernail polish or jewelry.  -  Begin using Incentive Spirometer 1 week prior to surgery.  Use 4 times per day, up  to 5-10 breaths each time.  Bring Incentive Spirometer to hospital.  - Do not bring your own medications to the hospital, except for inhalers and eye    drops.  -  Bring your ID and insurance card.    Questions or Concerns:  -If you have questions or concerns regarding the day of surgery, please call [phone].       -For questions after surgery please call your surgeons office.           AFTER YOUR SURGERY  Breathing exercises   Breathing exercises help you recover faster. Take deep breaths and let the air out slowly. This will:     Help you wake up after surgery.    Help prevent complications like pneumonia.  Preventing complications will help you go home sooner.   We may give you a breathing device (incentive spirometer) to encourage you to breathe deeply.   Nausea and vomiting   You may feel sick to your stomach after surgery; if so, let your nurse know.    Pain control:  After surgery, you may have pain. Our goal is to help you manage your pain. Pain medicine will help you feel comfortable enough to do activities that will help you heal.  These activities may include breathing exercises, walking and physical therapy.   To help your health care team treat your pain we will ask: 1) If you have pain  2) where it is located 3) describe your pain in your words  Methods of pain control include medications given by mouth, vein or by nerve block for some surgeries.  We may give you a pain control pump that will:  1) Deliver the medicine through a tube placed in your vein  2) Control the amount of medicine you receive  3) Allow you to push a button to deliver a dose of pain medicine  Sequential Compression Device (SCD) or Pneumo Boots:  You may need to wear SCD S on your legs or feet. These are wraps connected to a machine that pumps in air and releases it. The repeated pumping helps prevent blood clots from forming.

## 2018-11-01 NOTE — H&P
Pre-Operative H & P     CC:  Preoperative exam to assess for increased cardiopulmonary risk while undergoing surgery and anesthesia.    Date of Encounter: 11/1/2018  Primary Care Physician:  Pop Zapien  Reason for visit: Urothelial cancer (H) [C68.9]  - Primary     HPI  Dimple Oviedo is a 85 year old female who presents for pre-operative H & P in preparation for Right Robotic Nephroureterectomy and possible lymph node dissection with Dr. King on 11/13/18 at [facility]. History is obtained from the patient.   Patient who is followed by Dr. King for urothelial carcinoma. She is s/p multiple procedures including repeat biopsy, washings, ureteroscopy and ureteral washings in 9/2018. Biopsy showed urothelial cancer on the right side as well as an irregular right kidney. She has been having continued hematuria in the last month along with some incontinence. She was counseled for above procedure.     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  She was initially anticoagulated but was dc'd related to hematuria. She underwent Holter monitoring this past July that was within normal limits. She denies any chest pain, SOB, or palpitations. She is followed by Dr. Griffith, last seen on 9/27/18 and considered stable  but he hopes to get her back on anticoagulation after above surgery.    Past Medical History  Past Medical History:   Diagnosis Date     Calculus of kidney      Esophageal reflux      GERD (gastroesophageal reflux disease)      Hyperlipidemia LDL goal <130 5/9/2010     Malignant melanoma of skin of trunk, except scrotum (H)      Nonspecific abnormal finding     has living will 2004 -      Nontoxic multinodular goiter     no further eval /tx rec per pt     Osteopenia      Other psoriasis      Personal history of colonic polyps       PMR (polymyalgia rheumatica) (H)      Stress-induced cardiomyopathy      Undiagnosed cardiac murmurs      Unspecified constipation      Unspecified essential hypertension      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       Past Surgical History  Past Surgical History:   Procedure Laterality Date     BIOPSY       C NONSPECIFIC PROCEDURE  2005    colonoscopy polyp repeat 2010     COLONOSCOPY  2014     COMBINED CYSTOSCOPY, INSERT STENT URETER(S) Bilateral 9/12/2018    Procedure: COMBINED CYSTOSCOPY, INSERT STENT URETER(S);  Cystoscopy Bilateral ureteral Stent Placement.;  Surgeon: Justin Henry MD;  Location: [facility]     COMBINED CYSTOSCOPY, RETROGRADES, URETEROSCOPY, INSERT STENT Bilateral 4/3/2018    Procedure: COMBINED CYSTOSCOPY, RETROGRADES, URETEROSCOPY, INSERT STENT;;  Surgeon: Stuart King MD;  Location: [facility] OR     COMBINED CYSTOSCOPY, RETROGRADES, URETEROSCOPY, INSERT STENT Bilateral 9/10/2018    Procedure: COMBINED CYSTOSCOPY, RETROGRADES, URETEROSCOPY, INSERT STENT;  Cystoscopy, Bilateral Ureteroscopy, Bladder Biopsies, Retrogram Pyelograms, Ureteral Washings and brushings, cysview;  Surgeon: Stuart King MD;  Location: [facility] OR     CYSTOSCOPY, BIOPSY BLADDER INSTILL OPTICAL AGENT N/A 4/3/2018    Procedure: CYSTOSCOPY, BIOPSY BLADDER INSTILL OPTICAL AGENT;  Cystoscopy, Blue Light Cystoscopy, Bladder Biopsies, Bilateral Selective ureteral washings for Cytology, Bilateral Retrograde Pyelograms, Bilateral Ureteroscopy;  Surgeon: Stuart King MD;  [facility]     CYSTOSCOPY, BIOPSY BLADDER, COMBINED N/A 2/19/2018    Procedure: COMBINED CYSTOSCOPY, BIOPSY BLADDER;  Cystoscopy, Bladder Biopsy;  Surgeon: Kenna La MD;  Location: [facility] OR     ENDOSCOPIC ULTRASOUND LOWER GASTROINTESTIONAL TRACT (GI) N/A 10/30/2015    Procedure: ENDOSCOPIC ULTRASOUND LOWER GASTROINTESTIONAL TRACT (GI);  Surgeon: Daniel Jean-Baptiste MD;  Location: [facility] OR     EYE SURGERY  12/4/17      LAPAROSCOPIC CHOLECYSTECTOMY WITH CHOLANGIOGRAMS N/A 11/1/2015    Procedure: LAPAROSCOPIC CHOLECYSTECTOMY WITH CHOLANGIOGRAMS;  Surgeon: Tonie Warren MD;  Location: [facility] OR     SURGICAL HISTORY OF -   1996    malignant melanoma     SURGICAL HISTORY OF -   1968    thyroid nodule     SURGICAL HISTORY OF -       D & C       Hx of Blood transfusions/reactions: Denies.      Hx of abnormal bleeding or anti-platelet use: ASA 81 mg daily.    Menstrual history: No LMP recorded. Patient is postmenopausal.    Steroid use in the last year: Denies.     Personal or FH with difficulty with Anesthesia:  PONV.    Prior to Admission Medications  Current Outpatient Prescriptions   Medication Sig Dispense Refill     alendronate (FOSAMAX) 70 MG tablet TAKE 1 TABLET EVERY 7 DAYS AT LEAST 60 MINUTES BEFORE BREAKFAST AS DIRECTED. SEE PACKAGE FOR ADDITIONAL INSTRUCTIONS (Patient taking differently: TAKE 1 TABLET EVERY 7 DAYS AT LEAST 60 MINUTES BEFORE BREAKFAST AS DIRECTED. (Tuesdays0) 12 tablet 0     aspirin 81 MG tablet Take 81 mg by mouth every evening        Calcium Citrate-Vitamin D (CALCIUM + D PO) Take 1 tablet by mouth 2 times daily        colestipol (COLESTID) 1 g tablet Take 1 g by mouth 2 times daily TAKE OTHER MEDS 1 HOUR BEFORE OR 4 HOURS AFTER COLESID       cycloSPORINE (RESTASIS) 0.05 % ophthalmic emulsion Place 1 drop into both eyes 2 times daily       ferrous sulfate 325 (65 Fe) MG TBEC EC tablet Take 325 mg by mouth daily       furosemide (LASIX) 20 MG tablet Take 1 tablet (20 mg) by mouth every morning 90 tablet 1     irbesartan (AVAPRO) 300 MG tablet TAKE 1 TABLET EVERY DAY (Patient taking differently: Take 300 mg by mouth every morning ) 90 tablet 2     lovastatin (MEVACOR) 40 MG tablet TAKE 1 TABLET AT BEDTIME (HYPERLIPIDEMIA LDL GOAL BELOW 130) 90 tablet 2     Melatonin 10 MG TABS tablet Take 10 mg by mouth as needed for sleep        Omega-3 Fatty Acids (FISH OIL) 500 MG CAPS Take 1 capsule by mouth 2 times  daily        omeprazole (PRILOSEC) 20 MG CR capsule Take 1 capsule (20 mg) by mouth daily (Patient taking differently: Take 20 mg by mouth every morning ) 180 capsule 3     Probiotic Product (PROBIOTIC ADVANCED PO) Take 1 capsule by mouth every morning        propranolol (INDERAL LA) 60 MG 24 hr capsule Take 1 capsule (60 mg) by mouth daily (Patient taking differently: Take 60 mg by mouth every morning ) 90 capsule 1     rOPINIRole (REQUIP) 0.25 MG tablet Take 1 tablet (0.25 mg) by mouth 2 times daily as needed (restless leg syndrome) 180 tablet 1     sertraline (ZOLOFT) 100 MG tablet Take 1 tablet (100 mg) by mouth every morning 90 tablet 1     traZODone (DESYREL) 50 MG tablet TAKE 1/2 TABLET(25 MG)  AT BEDTIME 45 tablet 1     acetaminophen (TYLENOL) 650 MG CR tablet Take 650 mg by mouth as needed       HYDROcodone-acetaminophen (NORCO) 5-325 MG per tablet Take 1 tablet by mouth every 6 hours as needed for severe pain (Patient not taking: Reported on 10/31/2018) 10 tablet 0     methimazole (TAPAZOLE) 5 MG tablet Take 1 tablet (5 mg) by mouth 2 times daily 180 tablet 3     nitroGLYcerin (NITROSTAT) 0.4 MG sublingual tablet For chest pain place 1 tablet under the tongue every 5 minutes for 3 doses. If symptoms persist 5 minutes after 1st dose call 911. (Patient not taking: Reported on 10/31/2018) 25 tablet 3     order for DME Equipment being ordered: Knee high compression for B LE 20-30mmHg, Velcro units for night time (consider hybrid sock as foot swelling is less than leg swelling), Donning device 1 each 2       Allergies  Allergies   Allergen Reactions     Codeine      No Known Drug Allergies        Social History  Social History     Social History     Marital status:      Spouse name:      Number of children: 2     Years of education: N/A     Occupational History      Retired     Social History Main Topics     Smoking status: Never Smoker     Smokeless tobacco: Never Used     Alcohol use No       Comment: 6 times per year-one drink     Drug use: No     Sexual activity: Yes     Partners: Male     Other Topics Concern      Service No     Blood Transfusions No     Exercise Yes     curves     Seat Belt Yes     Self-Exams Yes     Parent/Sibling W/ Cabg, Mi Or Angioplasty Before 65f 55m? No     Social History Narrative    December 7, 2009    Balanced Diet - Yes    Osteoporosis Prevention Measures - Dairy servings per day: 2 and Medication/Supplements (See current meds)    Regular Exercise -  Yes Describe curves, walking    Dental Exam - YES - Date: 10/2009    Eye Exam - YES - Date: 2008    Self Breast Exam - Yes    Abuse: Current or Past (Physical, Sexual or Emotional)- No    Do you feel safe in your environment - Yes    Guns stored in the home - No    Sunscreen used - Yes    Seatbelts used - Yes    Lipids -  YES - Date: 11/24/2009    Glucose -  YES - Date: 11/24/2009    Colon Cancer Screening - Colonoscopy 11/18/2005(date completed)    Hemoccults - YES - Date: 10/12/2004    Pap Test -  YES - Date: 09/22/2004    Do you have any concerns about STD's -  No    Mammography - YES - Date: 11/24/2009    DEXA - YES - Date: 11/20/2008    Immunizations reviewed and up to date - Yes    PAULETTE Spencer, [facility] [address]                   Family History  Family History   Problem Relation Age of Onset     Cancer Father      dec - esophageal and laryngeal     HEART DISEASE Mother      Respiratory Mother      dec     Breast Cancer Daughter      Other Cancer Daughter      Thyroid Disease Daughter      Asthma Daughter      Hyperlipidemia Son      Diabetes Son        Review of Systems    ROS/MED HX  The complete review of systems is negative other than noted in the HPI or here.   ENT/Pulmonary:     (+)JESS risk factors snores loudly, hypertension, , . .   (-) tobacco use   Neurologic:  - neg neurologic ROS   (+)other neuro tremors, RLS    Cardiovascular: Comment: CP episode 12/13/17 with elevated troponin. 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  Angiogram no CAD. F/U ECHO Chaitanya EF 65%.   AF with RVR in setting of hyperthyroidism. Spontaneous conversion. Managed with B-blocker, ACEI, statin and ASA.   On Tapaxole.  last endocrine  visit 1/2018. Xarelto DC'd 2/2 bleeding.         (+) hypertension----. Taking blood thinners Pt has received instructions: Instructions Given to patient: Planned 7 day hold for surgery. CHF etiology: Stress induced cardiomyopathy 12/13/17 Last EF: 55-60% date: 7/2018 . . :. dysrhythmias a-fib, . Previous cardiac testing Echodate:7/2018results:date: results:ECG reviewed date:9/27/18 results:SR, LADCath date: 12/2017 results:          METS/Exercise Tolerance: Comment: METS<4 1 - Eating, dressing   Hematologic:     (+) Anemia, -     (-) History of Transfusion   Musculoskeletal: Comment: Osteopenia    Osteoarthritis in knees bilaterally.  - neg musculoskeletal ROS       GI/Hepatic:     (+) GERD Asymptomatic on medication, cholecystitis/cholelithiasis (s/p cholecystectomy),       Renal/Genitourinary:     (+) chronic renal disease, type: CRI, Nephrolithiasis , Pt does not require dialysis, Pt has no history of transplant, Other Renal/ Genitourinary, Hematuria      Endo:     (+) thyroid problem (Graves disease)  hyperthyroidism, Obesity (BMI 31), .      Psychiatric:     (+) psychiatric history depression      Infectious Disease:  - neg infectious disease ROS       Malignancy:   (+) Malignancy History of Other  Skin CA status post Surgery, Other CA Urothelial cancer Active status post Surgery         Other:    (+) C-spine cleared: N/A, no H/O Chronic Pain,other significant disability Other (comment)             PHYSICAL EXAM:   Mental Status/Neuro: Age Appropriate   Airway: Facies: Feasible  Mallampati: II  Mouth/Opening: Full  Neck ROM: Limited   Respiratory: Auscultation: CTAB     Resp. Rate: Normal     Resp. Effort: Normal      CV: Rhythm: Regular  Heart: Normal Sounds   Comments:      Dental:  Dental Comments: Upper left front tooth  "capped            Temp: 97.9  F (36.6  C) Temp src: Oral BP: 151/52 Pulse: 60     SpO2: 97 %         140 lbs 11.2 oz  4' 9\"   Body mass index is 30.45 kg/(m^2).       Physical Exam  Constitutional: Awake, alert, cooperative, no apparent distress, and appears stated age. Accompanied by daughter in law.  Eyes: Pupils equal, round and reactive to light, extra ocular muscles intact, sclera clear, conjunctiva normal.  HENT: Normocephalic, oral pharynx with moist mucus membranes, good dentition. No goiter appreciated. Bilateral hearing aids.   Respiratory: Clear to auscultation bilaterally, no crackles or wheezing. No cough or obvious dyspnea.  Cardiovascular: Regular rate and rhythm, systolic murmur noted.  Carotids bruit, left side. Significant chronic edema bilateral lower extremities with compression stockings on. Palpable pulses to radial  DP and PT arteries.   GI: Normal bowel sounds, soft, non-distended, non-tender, no masses palpated, no hepatosplenomegaly.    Lymph/Hematologic: No cervical lymphadenopathy and no supraclavicular lymphadenopathy.  Genitourinary: Deferred.   Skin: Warm and dry.  No rashes at anticipated surgical site.   Musculoskeletal: Limited ROM of neck. There is no redness, warmth, or swelling of the joints. Gross motor strength is mildly limited.     Neurologic: Awake, alert, oriented to name, place and time. Cranial nerves II-XII are grossly intact. Gait is cautious. Walks with walker or cane.   Neuropsychiatric: Calm, cooperative. Normal affect. Somewhat sad today. Not looking forward to surgery.    Labs: (personally reviewed)   Lab Results   Component Value Date    WBC 8.7 11/01/2018     Lab Results   Component Value Date    RBC 4.43 11/01/2018     Lab Results   Component Value Date    HGB 11.8 11/01/2018     Lab Results   Component Value Date    HCT 38.7 11/01/2018     Lab Results   Component Value Date    MCV 87 11/01/2018     Lab Results   Component Value Date    MCH 26.6 11/01/2018 "     Lab Results   Component Value Date    MCHC 30.5 11/01/2018     Lab Results   Component Value Date    RDW 14.4 11/01/2018     Lab Results   Component Value Date     11/01/2018     Last Comprehensive Metabolic Panel:  Sodium   Date Value Ref Range Status   11/01/2018 138 133 - 144 mmol/L Final     Potassium   Date Value Ref Range Status   11/01/2018 5.3 3.4 - 5.3 mmol/L Final     Chloride   Date Value Ref Range Status   11/01/2018 102 94 - 109 mmol/L Final     Carbon Dioxide   Date Value Ref Range Status   11/01/2018 28 20 - 32 mmol/L Final     Anion Gap   Date Value Ref Range Status   11/01/2018 8 3 - 14 mmol/L Final     Glucose   Date Value Ref Range Status   11/01/2018 88 70 - 99 mg/dL Final     Urea Nitrogen   Date Value Ref Range Status   11/01/2018 20 7 - 30 mg/dL Final     Creatinine   Date Value Ref Range Status   11/01/2018 0.87 0.52 - 1.04 mg/dL Final     GFR Estimate   Date Value Ref Range Status   11/01/2018 62 >60 mL/min/1.7m2 Final     Comment:     Non  GFR Calc     Calcium   Date Value Ref Range Status   11/01/2018 9.2 8.5 - 10.1 mg/dL Final     Lab Results   Component Value Date     09/11/2018     Lab Results   Component Value Date    ALT 92 09/11/2018     Lab Results   Component Value Date    BILICONJ 0.0 09/10/2003      Lab Results   Component Value Date    BILITOTAL 0.7 09/11/2018     Lab Results   Component Value Date    ALBUMIN 3.1 09/11/2018     Lab Results   Component Value Date    PROTTOTAL 6.5 09/11/2018      Lab Results   Component Value Date    ALKPHOS 129 09/11/2018   Ferritin 36  Iron 46  Iron binding capacity 388  Iron saturation index 12  TSH <0.01  T4 0.71  T3 74  EKG: Personally reviewed 9/27/18 Sinus rhythm, left axis deviation.  Cardiac echo: 9/11/18   Interpretation Summary  Left ventricular function, chamber size, wall motion, and wall thickness are  normal.The EF is 55-60%.  Right ventricular function, chamber size, wall motion, and thickness  are  normal.  Severe left atrial enlargement is present.  Aortic valve poorly visualized but appears to have moderate calcification and  at least mild-moderate stenosis.  Mild to moderate aortic insufficiency is present.  IVC measures 2.47cm and collapses with inspiration. Estimated mean right  atrial pressure is 8 mmHg (mildly elevated).  No pericardial effusion is present.  US Carotid 8/29/18  Impression:  No hemodynamically significant stenosis in either carotid artery.  Ziopatch 5/2018  One symptomatic episode corresponded to SR  Angiogram 2017  Normal coronary arteries  9/10/18 CT abd/pelvis  IMPRESSION:   1. Findings most likely related to recent pyelogram in the kidneys,  including mild bilateral pelvocaliectasis, with contrast distention of  the collecting systems and small amount of air in the right renal  collecting system. Increased enlargement and edema of both kidneys  with new perinephric fat stranding, is nonspecific, but can be seen in  the setting of acute kidney injury or infection.  2. New simple free fluid in the pelvis. No evidence of hemorrhage or  contrast extravasation.  3. New small bilateral pleural effusions and interstitial pulmonary  edema since prior.   Imaging and cardiac testing reviewed by this provider    Outside records reviewed from: Care Everywhere     ASSESSMENT and PLAN  Dimple Oviedo is a 85 year old female who presents for pre-operative H & P in preparation for Right Robotic Nephroureterectomy and possible lymph node dissection with Dr. King on 11/13/18 at [facility].  She has the following specific operative considerations:   RCRI: 6.6% risk of major adverse cardiac event.  JESS # of risks 3/8 = intermediate  VTE risk:  3%.  Increased VTE risk: Recommend use of mechanical/chemical VTE prophylaxis in the sandip- and postoperative periods.    PONV risk 4. If 3 or > anti emetic intervention recommended with two or more meds  Post-op  delirium risk: elevated given age   --Urothelial carcinoma, s/p multiple procedures. Above procedure now planned.    --HLD. HYPERTENSION. Will hold Lasix and irbesartan but take Propranolol on DOS. ASA 81 mg daily with planned 7 day hold for surgery. H/O stress cardiomyopathy with recovered EF.  EF 55-60% (echo 7/2018). Coronary angiogram 12/2017 with normal coronary arteries.  H/O atrial fibrillation with rapid ventricular response (1/2018). Thought to be r/t to fluid overload. Initially anticoagulated but  Xarelto stopped after first dose given significant hematuria.  Holter monitor in May WNL. Moderate to severe aortic stenosis/insufficiency. Right carotid bruit on exam today.  Carotid US neg above. Limited activity, walks with assistive device.    --Nonsmoker. No pulmonary symptoms.    --Anemia labs to be updated today with consideration for iron infusions prior to surgery. Patient and family agree. Will arrange after lab results reviewed.   --GERD. Will take Omeprazole on DOS.    --Graves disease, take Tapazole as prescribed DOS.  Propranolol also for thyroid disease.    --Depression. Will take Sertraline on DOS.    --RLS. Will take Requip on DOS.   Type and screen drawn today.   Arrival time, NPO, shower and medication instructions provided by nursing staff today. Preparing For Your Surgery handout given.  Patient discussed with Dr. Carpenter.   Addendum: After labs returned, it was decided that iron infusions will not be needed. Patient aware of results.   NELSON Kwok CNS  Preoperative Assessment Center  [facility] and [facility]  Phone: [phone]  Fax: [phone]

## 2018-11-05 ENCOUNTER — CARE COORDINATION (OUTPATIENT)
Dept: UROLOGY | Facility: CLINIC | Age: 83
End: 2018-11-05

## 2018-11-05 NOTE — PROGRESS NOTES
Pre Op Teaching Flowsheet       Pre and Post op Patient Education  Relevant Diagnosis:  Urothelial cancer  Surgical procedure:  Right Robotic Nephroureterectomy and possible lymph node dissection  Teaching Topic:  Pre and post op teaching  Person Involved in teaching: Dimple Oviedo    Motivation Level:  Asks Questions: Yes  Eager to Learn:  Yes  Cooperative: Yes  Receptive (willing/able to accept information):  Yes    Patient demonstrates understanding of the following:  Date of surgery:  11/13/18  Location of surgery:  [address] [place] [address]  History and Physical and any other testing necessary prior to surgery: Yes  Required time line for completion of History and Physical and any pre-op testing: Yes    Patient demonstrates understanding of the following:  Pre-op bowel prep:  None needed  Pre-op showering/scrub information with PCMX Soap: Yes  Blood thinner medications discussed and when to stop (if applicable):  Yes      Infection Prevention:   Patient demonstrates understanding of the following:  Surgical procedure site care taught: at time of discharge  Signs and symptoms of infection taught:  Yes      Post-op follow-up:  Discussed how to contact the hospital, nurse, and clinic scheduling staff if necessary.    Instructional materials used/given/mailed:  [facility] Booklet, post op teaching sheet, Map, Soap, and arrival/location information.    Surgical instructions packet mailed to patient.     Patient has a  and someone to stay with her for the first 24 hours.

## 2018-11-08 ENCOUNTER — TELEPHONE (OUTPATIENT)
Dept: ENDOCRINOLOGY | Facility: CLINIC | Age: 83
End: 2018-11-08

## 2018-11-08 DIAGNOSIS — E04.2 NONTOXIC MULTINODULAR GOITER: ICD-10-CM

## 2018-11-13 ENCOUNTER — ANESTHESIA (OUTPATIENT)
Dept: SURGERY | Facility: CLINIC | Age: 83
DRG: 657 | End: 2018-11-13
Payer: MEDICARE

## 2018-11-13 ENCOUNTER — HOSPITAL ENCOUNTER (INPATIENT)
Facility: CLINIC | Age: 83
LOS: 2 days | Discharge: HOME | DRG: 657 | End: 2018-11-15
Attending: UROLOGY | Admitting: UROLOGY
Payer: MEDICARE

## 2018-11-13 DIAGNOSIS — G89.18 ACUTE POST-OPERATIVE PAIN: Primary | ICD-10-CM

## 2018-11-13 PROBLEM — C66.1: Status: ACTIVE | Noted: 2018-11-13

## 2018-11-13 LAB
ABO + RH BLD: NORMAL
ABO + RH BLD: NORMAL
ANION GAP SERPL CALCULATED.3IONS-SCNC: 7 MMOL/L (ref 3–14)
BLD GP AB SCN SERPL QL: NORMAL
BLOOD BANK CMNT PATIENT-IMP: NORMAL
BLOOD BANK CMNT PATIENT-IMP: NORMAL
BUN SERPL-MCNC: 20 MG/DL (ref 7–30)
CALCIUM SERPL-MCNC: 8.5 MG/DL (ref 8.5–10.1)
CHLORIDE SERPL-SCNC: 105 MMOL/L (ref 94–109)
CO2 SERPL-SCNC: 27 MMOL/L (ref 20–32)
CREAT SERPL-MCNC: 0.85 MG/DL (ref 0.52–1.04)
ERYTHROCYTE [DISTWIDTH] IN BLOOD BY AUTOMATED COUNT: 15.4 % (ref 10–15)
GFR SERPL CREATININE-BSD FRML MDRD: 64 ML/MIN/1.7M2
GLUCOSE BLDC GLUCOMTR-MCNC: 94 MG/DL (ref 70–99)
GLUCOSE SERPL-MCNC: 127 MG/DL (ref 70–99)
HCT VFR BLD AUTO: 35.3 % (ref 35–47)
HGB BLD-MCNC: 11.2 G/DL (ref 11.7–15.7)
MCH RBC QN AUTO: 27.5 PG (ref 26.5–33)
MCHC RBC AUTO-ENTMCNC: 31.7 G/DL (ref 31.5–36.5)
MCV RBC AUTO: 87 FL (ref 78–100)
PLATELET # BLD AUTO: 227 10E9/L (ref 150–450)
POTASSIUM SERPL-SCNC: 4 MMOL/L (ref 3.4–5.3)
POTASSIUM SERPL-SCNC: 4.4 MMOL/L (ref 3.4–5.3)
RBC # BLD AUTO: 4.08 10E12/L (ref 3.8–5.2)
SODIUM SERPL-SCNC: 139 MMOL/L (ref 133–144)
SPECIMEN EXP DATE BLD: NORMAL
WBC # BLD AUTO: 13.1 10E9/L (ref 4–11)

## 2018-11-13 PROCEDURE — 37000008 ZZH ANESTHESIA TECHNICAL FEE, 1ST 30 MIN: Performed by: UROLOGY

## 2018-11-13 PROCEDURE — 25000128 H RX IP 250 OP 636: Performed by: UROLOGY

## 2018-11-13 PROCEDURE — 85027 COMPLETE CBC AUTOMATED: CPT | Performed by: STUDENT IN AN ORGANIZED HEALTH CARE EDUCATION/TRAINING PROGRAM

## 2018-11-13 PROCEDURE — 36000088 ZZH SURGERY LEVEL 8 EA 15 ADDTL MIN - UMMC: Performed by: UROLOGY

## 2018-11-13 PROCEDURE — 12000003 ZZH R&B CRITICAL UMMC

## 2018-11-13 PROCEDURE — 00000146 ZZHCL STATISTIC GLUCOSE BY METER IP

## 2018-11-13 PROCEDURE — 8E0W0CZ ROBOTIC ASSISTED PROCEDURE OF TRUNK REGION, OPEN APPROACH: ICD-10-PCS | Performed by: UROLOGY

## 2018-11-13 PROCEDURE — 36000086 ZZH SURGERY LEVEL 8 1ST 30 MIN UMMC: Performed by: UROLOGY

## 2018-11-13 PROCEDURE — 71000015 ZZH RECOVERY PHASE 1 LEVEL 2 EA ADDTL HR: Performed by: UROLOGY

## 2018-11-13 PROCEDURE — 27210794 ZZH OR GENERAL SUPPLY STERILE: Performed by: UROLOGY

## 2018-11-13 PROCEDURE — 88311 DECALCIFY TISSUE: CPT | Performed by: UROLOGY

## 2018-11-13 PROCEDURE — 25000128 H RX IP 250 OP 636: Performed by: ANESTHESIOLOGY

## 2018-11-13 PROCEDURE — 80048 BASIC METABOLIC PNL TOTAL CA: CPT | Performed by: STUDENT IN AN ORGANIZED HEALTH CARE EDUCATION/TRAINING PROGRAM

## 2018-11-13 PROCEDURE — 40000275 ZZH STATISTIC RCP TIME EA 10 MIN

## 2018-11-13 PROCEDURE — 0TT60ZZ RESECTION OF RIGHT URETER, OPEN APPROACH: ICD-10-PCS | Performed by: UROLOGY

## 2018-11-13 PROCEDURE — 88331 PATH CONSLTJ SURG 1 BLK 1SPC: CPT | Performed by: UROLOGY

## 2018-11-13 PROCEDURE — 40000170 ZZH STATISTIC PRE-PROCEDURE ASSESSMENT II: Performed by: UROLOGY

## 2018-11-13 PROCEDURE — 0TP98DZ REMOVAL OF INTRALUMINAL DEVICE FROM URETER, VIA NATURAL OR ARTIFICIAL OPENING ENDOSCOPIC: ICD-10-PCS | Performed by: UROLOGY

## 2018-11-13 PROCEDURE — 36415 COLL VENOUS BLD VENIPUNCTURE: CPT | Performed by: ANESTHESIOLOGY

## 2018-11-13 PROCEDURE — 25000128 H RX IP 250 OP 636: Performed by: NURSE ANESTHETIST, CERTIFIED REGISTERED

## 2018-11-13 PROCEDURE — 37000009 ZZH ANESTHESIA TECHNICAL FEE, EACH ADDTL 15 MIN: Performed by: UROLOGY

## 2018-11-13 PROCEDURE — 84132 ASSAY OF SERUM POTASSIUM: CPT | Performed by: ANESTHESIOLOGY

## 2018-11-13 PROCEDURE — 25000125 ZZHC RX 250: Performed by: NURSE ANESTHETIST, CERTIFIED REGISTERED

## 2018-11-13 PROCEDURE — 71000014 ZZH RECOVERY PHASE 1 LEVEL 2 FIRST HR: Performed by: UROLOGY

## 2018-11-13 PROCEDURE — 88305 TISSUE EXAM BY PATHOLOGIST: CPT | Performed by: UROLOGY

## 2018-11-13 PROCEDURE — 25000128 H RX IP 250 OP 636: Performed by: STUDENT IN AN ORGANIZED HEALTH CARE EDUCATION/TRAINING PROGRAM

## 2018-11-13 PROCEDURE — 25000565 ZZH ISOFLURANE, EA 15 MIN: Performed by: UROLOGY

## 2018-11-13 PROCEDURE — 88307 TISSUE EXAM BY PATHOLOGIST: CPT | Performed by: UROLOGY

## 2018-11-13 PROCEDURE — 0TT00ZZ RESECTION OF RIGHT KIDNEY, OPEN APPROACH: ICD-10-PCS | Performed by: UROLOGY

## 2018-11-13 PROCEDURE — 07BD0ZX EXCISION OF AORTIC LYMPHATIC, OPEN APPROACH, DIAGNOSTIC: ICD-10-PCS | Performed by: UROLOGY

## 2018-11-13 PROCEDURE — 40000014 ZZH STATISTIC ARTERIAL MONITORING DAILY

## 2018-11-13 RX ORDER — PROPOFOL 10 MG/ML
INJECTION, EMULSION INTRAVENOUS PRN
Status: DISCONTINUED | OUTPATIENT
Start: 2018-11-13 | End: 2018-11-13

## 2018-11-13 RX ORDER — CEFAZOLIN SODIUM 1 G/3ML
1 INJECTION, POWDER, FOR SOLUTION INTRAMUSCULAR; INTRAVENOUS SEE ADMIN INSTRUCTIONS
Status: DISCONTINUED | OUTPATIENT
Start: 2018-11-13 | End: 2018-11-13 | Stop reason: HOSPADM

## 2018-11-13 RX ORDER — FENTANYL CITRATE 50 UG/ML
25-50 INJECTION, SOLUTION INTRAMUSCULAR; INTRAVENOUS
Status: DISCONTINUED | OUTPATIENT
Start: 2018-11-13 | End: 2018-11-13 | Stop reason: HOSPADM

## 2018-11-13 RX ORDER — SODIUM CHLORIDE, SODIUM LACTATE, POTASSIUM CHLORIDE, CALCIUM CHLORIDE 600; 310; 30; 20 MG/100ML; MG/100ML; MG/100ML; MG/100ML
INJECTION, SOLUTION INTRAVENOUS CONTINUOUS
Status: DISCONTINUED | OUTPATIENT
Start: 2018-11-13 | End: 2018-11-13 | Stop reason: HOSPADM

## 2018-11-13 RX ORDER — MONTELUKAST SODIUM 4 MG/1
1 TABLET, CHEWABLE ORAL 2 TIMES DAILY
Status: DISCONTINUED | OUTPATIENT
Start: 2018-11-13 | End: 2018-11-15 | Stop reason: HOSPADM

## 2018-11-13 RX ORDER — LIDOCAINE 40 MG/G
CREAM TOPICAL
Status: DISCONTINUED | OUTPATIENT
Start: 2018-11-13 | End: 2018-11-13 | Stop reason: HOSPADM

## 2018-11-13 RX ORDER — HYDROMORPHONE HYDROCHLORIDE 1 MG/ML
.3-.5 INJECTION, SOLUTION INTRAMUSCULAR; INTRAVENOUS; SUBCUTANEOUS EVERY 5 MIN PRN
Status: DISCONTINUED | OUTPATIENT
Start: 2018-11-13 | End: 2018-11-13 | Stop reason: HOSPADM

## 2018-11-13 RX ORDER — DEXAMETHASONE SODIUM PHOSPHATE 4 MG/ML
INJECTION, SOLUTION INTRA-ARTICULAR; INTRALESIONAL; INTRAMUSCULAR; INTRAVENOUS; SOFT TISSUE PRN
Status: DISCONTINUED | OUTPATIENT
Start: 2018-11-13 | End: 2018-11-13

## 2018-11-13 RX ORDER — DIPHENHYDRAMINE HCL 12.5MG/5ML
12.5 LIQUID (ML) ORAL EVERY 6 HOURS PRN
Status: DISCONTINUED | OUTPATIENT
Start: 2018-11-13 | End: 2018-11-15 | Stop reason: HOSPADM

## 2018-11-13 RX ORDER — SODIUM CHLORIDE 9 MG/ML
INJECTION, SOLUTION INTRAVENOUS CONTINUOUS
Status: DISCONTINUED | OUTPATIENT
Start: 2018-11-13 | End: 2018-11-15 | Stop reason: HOSPADM

## 2018-11-13 RX ORDER — CEFAZOLIN SODIUM 2 G/100ML
2 INJECTION, SOLUTION INTRAVENOUS
Status: COMPLETED | OUTPATIENT
Start: 2018-11-13 | End: 2018-11-13

## 2018-11-13 RX ORDER — DIPHENHYDRAMINE HYDROCHLORIDE 50 MG/ML
12.5 INJECTION INTRAMUSCULAR; INTRAVENOUS EVERY 6 HOURS PRN
Status: DISCONTINUED | OUTPATIENT
Start: 2018-11-13 | End: 2018-11-15 | Stop reason: HOSPADM

## 2018-11-13 RX ORDER — HEPARIN SODIUM 5000 [USP'U]/.5ML
5000 INJECTION, SOLUTION INTRAVENOUS; SUBCUTANEOUS EVERY 8 HOURS
Status: DISCONTINUED | OUTPATIENT
Start: 2018-11-14 | End: 2018-11-15 | Stop reason: HOSPADM

## 2018-11-13 RX ORDER — FENTANYL CITRATE 50 UG/ML
INJECTION, SOLUTION INTRAMUSCULAR; INTRAVENOUS PRN
Status: DISCONTINUED | OUTPATIENT
Start: 2018-11-13 | End: 2018-11-13

## 2018-11-13 RX ORDER — EPHEDRINE SULFATE 50 MG/ML
INJECTION, SOLUTION INTRAMUSCULAR; INTRAVENOUS; SUBCUTANEOUS PRN
Status: DISCONTINUED | OUTPATIENT
Start: 2018-11-13 | End: 2018-11-13

## 2018-11-13 RX ORDER — LIDOCAINE HYDROCHLORIDE 20 MG/ML
INJECTION, SOLUTION INFILTRATION; PERINEURAL PRN
Status: DISCONTINUED | OUTPATIENT
Start: 2018-11-13 | End: 2018-11-13

## 2018-11-13 RX ORDER — ACETAMINOPHEN 325 MG/1
650 TABLET ORAL EVERY 4 HOURS PRN
Status: DISCONTINUED | OUTPATIENT
Start: 2018-11-16 | End: 2018-11-15 | Stop reason: HOSPADM

## 2018-11-13 RX ORDER — SERTRALINE HYDROCHLORIDE 100 MG/1
100 TABLET, FILM COATED ORAL EVERY MORNING
Status: DISCONTINUED | OUTPATIENT
Start: 2018-11-14 | End: 2018-11-15 | Stop reason: HOSPADM

## 2018-11-13 RX ORDER — HYDRALAZINE HYDROCHLORIDE 20 MG/ML
INJECTION INTRAMUSCULAR; INTRAVENOUS PRN
Status: DISCONTINUED | OUTPATIENT
Start: 2018-11-13 | End: 2018-11-13

## 2018-11-13 RX ORDER — NALOXONE HYDROCHLORIDE 0.4 MG/ML
.1-.4 INJECTION, SOLUTION INTRAMUSCULAR; INTRAVENOUS; SUBCUTANEOUS
Status: DISCONTINUED | OUTPATIENT
Start: 2018-11-13 | End: 2018-11-15 | Stop reason: HOSPADM

## 2018-11-13 RX ORDER — ONDANSETRON 4 MG/1
4 TABLET, ORALLY DISINTEGRATING ORAL EVERY 6 HOURS PRN
Status: DISCONTINUED | OUTPATIENT
Start: 2018-11-13 | End: 2018-11-15 | Stop reason: HOSPADM

## 2018-11-13 RX ORDER — IRBESARTAN 300 MG/1
300 TABLET ORAL EVERY MORNING
Status: DISCONTINUED | OUTPATIENT
Start: 2018-11-14 | End: 2018-11-15 | Stop reason: HOSPADM

## 2018-11-13 RX ORDER — ONDANSETRON 4 MG/1
4 TABLET, ORALLY DISINTEGRATING ORAL EVERY 30 MIN PRN
Status: DISCONTINUED | OUTPATIENT
Start: 2018-11-13 | End: 2018-11-13 | Stop reason: HOSPADM

## 2018-11-13 RX ORDER — ONDANSETRON 2 MG/ML
4 INJECTION INTRAMUSCULAR; INTRAVENOUS EVERY 30 MIN PRN
Status: DISCONTINUED | OUTPATIENT
Start: 2018-11-13 | End: 2018-11-13 | Stop reason: HOSPADM

## 2018-11-13 RX ORDER — ESMOLOL HYDROCHLORIDE 10 MG/ML
INJECTION INTRAVENOUS PRN
Status: DISCONTINUED | OUTPATIENT
Start: 2018-11-13 | End: 2018-11-13

## 2018-11-13 RX ORDER — PROPRANOLOL HCL 60 MG
60 CAPSULE, EXTENDED RELEASE 24HR ORAL EVERY MORNING
Status: DISCONTINUED | OUTPATIENT
Start: 2018-11-14 | End: 2018-11-15 | Stop reason: HOSPADM

## 2018-11-13 RX ORDER — NALOXONE HYDROCHLORIDE 0.4 MG/ML
.1-.4 INJECTION, SOLUTION INTRAMUSCULAR; INTRAVENOUS; SUBCUTANEOUS
Status: ACTIVE | OUTPATIENT
Start: 2018-11-13 | End: 2018-11-14

## 2018-11-13 RX ORDER — ONDANSETRON 2 MG/ML
INJECTION INTRAMUSCULAR; INTRAVENOUS PRN
Status: DISCONTINUED | OUTPATIENT
Start: 2018-11-13 | End: 2018-11-13

## 2018-11-13 RX ORDER — ROPINIROLE 0.25 MG/1
0.25 TABLET, FILM COATED ORAL 2 TIMES DAILY PRN
Status: DISCONTINUED | OUTPATIENT
Start: 2018-11-13 | End: 2018-11-15 | Stop reason: HOSPADM

## 2018-11-13 RX ORDER — ONDANSETRON 2 MG/ML
4 INJECTION INTRAMUSCULAR; INTRAVENOUS EVERY 6 HOURS PRN
Status: DISCONTINUED | OUTPATIENT
Start: 2018-11-13 | End: 2018-11-15 | Stop reason: HOSPADM

## 2018-11-13 RX ADMIN — ROCURONIUM BROMIDE 10 MG: 10 INJECTION INTRAVENOUS at 11:22

## 2018-11-13 RX ADMIN — CEFAZOLIN SODIUM 2 G: 2 INJECTION, SOLUTION INTRAVENOUS at 08:31

## 2018-11-13 RX ADMIN — DEXAMETHASONE SODIUM PHOSPHATE 4 MG: 4 INJECTION, SOLUTION INTRA-ARTICULAR; INTRALESIONAL; INTRAMUSCULAR; INTRAVENOUS; SOFT TISSUE at 10:11

## 2018-11-13 RX ADMIN — PROPOFOL 30 MG: 10 INJECTION, EMULSION INTRAVENOUS at 09:17

## 2018-11-13 RX ADMIN — Medication 7.5 MG: at 08:34

## 2018-11-13 RX ADMIN — ONDANSETRON HYDROCHLORIDE 4 MG: 2 INJECTION, SOLUTION INTRAMUSCULAR; INTRAVENOUS at 14:56

## 2018-11-13 RX ADMIN — FENTANYL CITRATE 25 MCG: 50 INJECTION INTRAMUSCULAR; INTRAVENOUS at 14:12

## 2018-11-13 RX ADMIN — FENTANYL CITRATE 75 MCG: 50 INJECTION, SOLUTION INTRAMUSCULAR; INTRAVENOUS at 08:20

## 2018-11-13 RX ADMIN — SODIUM CHLORIDE, POTASSIUM CHLORIDE, SODIUM LACTATE AND CALCIUM CHLORIDE: 600; 310; 30; 20 INJECTION, SOLUTION INTRAVENOUS at 11:21

## 2018-11-13 RX ADMIN — HYDRALAZINE HYDROCHLORIDE 2 MG: 20 INJECTION INTRAMUSCULAR; INTRAVENOUS at 09:44

## 2018-11-13 RX ADMIN — ROCURONIUM BROMIDE 50 MG: 10 INJECTION INTRAVENOUS at 08:20

## 2018-11-13 RX ADMIN — FENTANYL CITRATE 50 MCG: 50 INJECTION, SOLUTION INTRAMUSCULAR; INTRAVENOUS at 09:15

## 2018-11-13 RX ADMIN — CEFAZOLIN 1 G: 1 INJECTION, POWDER, FOR SOLUTION INTRAMUSCULAR; INTRAVENOUS at 12:26

## 2018-11-13 RX ADMIN — FENTANYL CITRATE 50 MCG: 50 INJECTION, SOLUTION INTRAMUSCULAR; INTRAVENOUS at 10:31

## 2018-11-13 RX ADMIN — ONDANSETRON 4 MG: 2 INJECTION INTRAMUSCULAR; INTRAVENOUS at 12:25

## 2018-11-13 RX ADMIN — SODIUM CHLORIDE, POTASSIUM CHLORIDE, SODIUM LACTATE AND CALCIUM CHLORIDE: 600; 310; 30; 20 INJECTION, SOLUTION INTRAVENOUS at 08:07

## 2018-11-13 RX ADMIN — CEFAZOLIN 1 G: 1 INJECTION, POWDER, FOR SOLUTION INTRAMUSCULAR; INTRAVENOUS at 10:30

## 2018-11-13 RX ADMIN — Medication 5 MG: at 10:58

## 2018-11-13 RX ADMIN — SUGAMMADEX 200 MG: 100 INJECTION, SOLUTION INTRAVENOUS at 12:44

## 2018-11-13 RX ADMIN — HYDRALAZINE HYDROCHLORIDE 2 MG: 20 INJECTION INTRAMUSCULAR; INTRAVENOUS at 11:58

## 2018-11-13 RX ADMIN — FENTANYL CITRATE 25 MCG: 50 INJECTION, SOLUTION INTRAMUSCULAR; INTRAVENOUS at 13:15

## 2018-11-13 RX ADMIN — FENTANYL CITRATE 25 MCG: 50 INJECTION, SOLUTION INTRAMUSCULAR; INTRAVENOUS at 08:12

## 2018-11-13 RX ADMIN — FENTANYL CITRATE 50 MCG: 50 INJECTION, SOLUTION INTRAMUSCULAR; INTRAVENOUS at 09:08

## 2018-11-13 RX ADMIN — ESMOLOL HYDROCHLORIDE 20 MG: 10 INJECTION, SOLUTION INTRAVENOUS at 13:00

## 2018-11-13 RX ADMIN — HYDRALAZINE HYDROCHLORIDE 2 MG: 20 INJECTION INTRAMUSCULAR; INTRAVENOUS at 09:40

## 2018-11-13 RX ADMIN — Medication: at 15:17

## 2018-11-13 RX ADMIN — LIDOCAINE HYDROCHLORIDE 80 MG: 20 INJECTION, SOLUTION INFILTRATION; PERINEURAL at 08:20

## 2018-11-13 RX ADMIN — HYDROMORPHONE HYDROCHLORIDE 0.3 MG: 1 INJECTION, SOLUTION INTRAMUSCULAR; INTRAVENOUS; SUBCUTANEOUS at 14:30

## 2018-11-13 RX ADMIN — HYDRALAZINE HYDROCHLORIDE 2 MG: 20 INJECTION INTRAMUSCULAR; INTRAVENOUS at 12:02

## 2018-11-13 RX ADMIN — HYDRALAZINE HYDROCHLORIDE 4 MG: 20 INJECTION INTRAMUSCULAR; INTRAVENOUS at 09:22

## 2018-11-13 RX ADMIN — SODIUM CHLORIDE: 9 INJECTION, SOLUTION INTRAVENOUS at 19:54

## 2018-11-13 RX ADMIN — HYDROMORPHONE HYDROCHLORIDE 0.3 MG: 1 INJECTION, SOLUTION INTRAMUSCULAR; INTRAVENOUS; SUBCUTANEOUS at 14:53

## 2018-11-13 RX ADMIN — ROCURONIUM BROMIDE 10 MG: 10 INJECTION INTRAVENOUS at 12:00

## 2018-11-13 RX ADMIN — PROPOFOL 140 MG: 10 INJECTION, EMULSION INTRAVENOUS at 08:20

## 2018-11-13 RX ADMIN — ROCURONIUM BROMIDE 10 MG: 10 INJECTION INTRAVENOUS at 09:49

## 2018-11-13 RX ADMIN — ROCURONIUM BROMIDE 10 MG: 10 INJECTION INTRAVENOUS at 09:07

## 2018-11-13 ASSESSMENT — ACTIVITIES OF DAILY LIVING (ADL): ADLS_ACUITY_SCORE: 17

## 2018-11-13 NOTE — BRIEF OP NOTE
[facility], [place]    Brief Operative Note    Pre-operative diagnosis: Urothelial Cancer   Post-operative diagnosis * No post-op diagnosis entered *  Procedure: Procedure(s):  Right DaVinci Assisted Nephroureterectomy  Possible Davinci Lymphadenectomy   Flexible Cystoscopy with Stent Removal  Surgeon: Surgeon(s) and Role:     * Stuart King MD - Primary     * Ramy Andujar MD - Resident - Assisting     * Luis Antonio Thomas MD - Resident - Assisting  Anesthesia: General   Estimated blood loss: 80ml  Drains: None  Specimens:   ID Type Source Tests Collected by Time Destination   A : Ana-caval mass Tissue Abdomen SURGICAL PATHOLOGY EXAM Stuart King MD 11/13/2018 11:19 AM    B : Ana-caval lymph nodes Tissue Abdomen SURGICAL PATHOLOGY EXAM Stuart King MD 11/13/2018 11:22 AM    C : Right Kidney and Ureter Tissue Kidney, Right SURGICAL PATHOLOGY EXAM Stuart King MD 11/13/2018 12:24 PM      Findings: Large para and retro caval lymph node with renal artery coursing through the mass.  Complications: None.  Implants: None.

## 2018-11-13 NOTE — ANESTHESIA CARE TRANSFER NOTE
Patient: Dimple Oviedo    Procedure(s):  Right DaVinci Assisted Nephroureterectomy  Possible Davinci Lymphadenectomy   Flexible Cystoscopy with Stent Removal    Diagnosis: Urothelial Cancer   Diagnosis Additional Information: No value filed.    Anesthesia Type:   No value filed.     Note:  Airway :Nasal Cannula  Patient transferred to:PACU  Comments: VSS, report to RN Handoff Report: Identifed the Patient, Identified the Reponsible Provider, Reviewed the pertinent medical history, Discussed the surgical course, Reviewed Intra-OP anesthesia mangement and issues during anesthesia, Set expectations for post-procedure period and Allowed opportunity for questions and acknowledgement of understanding      Vitals: (Last set prior to Anesthesia Care Transfer)    CRNA VITALS  11/13/2018 1239 - 11/13/2018 1315      11/13/2018             Pulse: 81    SpO2: 100 %    Resp Rate (observed): (!)  2                Electronically Signed By: NELSON Perez CRNA  November 13, 2018  1:15 PM

## 2018-11-13 NOTE — ANESTHESIA POSTPROCEDURE EVALUATION
Anesthesia POST Procedure Evaluation    Patient: Dimple Oviedo   MRN:     [id number] Gender:   female   Age:    85 year old :      1933        Preoperative Diagnosis: Urothelial Cancer    Procedure(s):  Right DaVinci Assisted Nephroureterectomy  Possible Davinci Lymphadenectomy   Flexible Cystoscopy with Stent Removal   Postop Comments: No value filed.       Anesthesia Type:  General    Reportable Event: NO     PAIN: Uncomplicated   Sign Out status: Comfortable, Well controlled pain     PONV: PONV Occurence     Symptoms:  Nausea only (mild)     Interventions: 5-HT3 antagonist   Sign Out status:  No Nausea or Vomiting     Neuro/Psych: Uneventful perioperative course   Sign Out Status: Preoperative baseline; Age appropriate mentation     Airway/Resp.: Uneventful perioperative course   Sign Out Status: Non labored breathing, age appropriate RR; Resp. Status within EXPECTED Parameters     CV: Uneventful perioperative course   Sign Out status: Appropriate BP and perfusion indices; Appropriate HR/Rhythm     Disposition:   Sign Out in:  PACU  Disposition:  Floor  Recovery Course: Uneventful  Follow-Up: Not required           Last Anesthesia Record Vitals:  CRNA VITALS  2018 1239 - 2018 1339      2018             Pulse: 81    SpO2: 100 %    Resp Rate (observed): (!)  2          Last PACU/Preop Vitals:  Vitals:    18 1530 18 1545 18 1600   BP: 127/51 133/51 134/50   Resp: 14     Temp:      SpO2: 97% 98% 97%         Electronically Signed By: Crista Etienne MD, 2018, 4:17 PM

## 2018-11-13 NOTE — OP NOTE
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 Cystoscopy and Stent Removal  2. Right robot assisted nephroureterectomy with Bladder Cuff  3. Rt Para caval and Precaval Lymphadenectomy   4. Biopsy of the Rt Hilar/ Retrocaval Mass    STAFF SURGEON: Dr. Stuart King MD  ASSISTANT: Ramy Andujar MD and Cuong Thomas M.D  ANESTHESIA: GET  ESTIMATED BLOOD LOSS: 80 mL.   IV FLUIDS: see dictated anesthesia record  COMPLICATIONS: None.   SPECIMEN: Right kidney and ureter with bladder cuff  DRAINS/TUBES:   16 Fr Diehl catheter    SIGNIFICANT FINDINGS: Rt Kidney with ureter appearing normal till the distal part with evidence of sandip Venacaval inflammation. Large para and retrocaval mass which was encasing the Right renal artery at the hilum and was separately biopsied.  Rest of the abdomen appeared normal.    BRIEF OPERATIVE INDICATIONS: Dimple Oviedo is a 85 year old female with positive FISH from the Rt ureteral washing who had a history of multiple episodes of significant and bothersome gross hematuria which was localized to the right ureter.  After discussion with patient about the risks, benefits, and alternatives of surgery she elected to proceed.     DESCRIPTION OF PROCEDURE:  Informed consent was obtained and the patient was brought to the operating room where general anesthesia was induced. she was given appropriate preoperative antibiotics within 1 hour of incision. she  was then positioned in the modified flank position with Right side up where all  pressure points were carefully padded and secured. she was then prepped and draped in the usual sterile fashion. We then performed a timeout, verifying the correct patient's site and procedure to be performed.    We began by inserting the Veress needle into the abdomen at the umbilicus. After  confirmatory drop test, the abdomen was insufflated with low opening pressures. We then proceeded to place three 8 mm non-dilating robotic ports to triangulate the  Right kidney.   An additional 5 mm port was placed for liver retraction.   The 12mm assistant port was placed in the low midline.This was later used  As the left robotic arm port for the dissection of the lower ureter and the bladder    We docked the robot.  We began by first mobilizing the colon from that the lateral aspect of the abdominal wall and reflecting it medially. Gerota's fascia was then opened and the lower pole of the kidney mobilized. The gonadal vessels were identified and divided. The ureter was also identified and clipped with Hem-o-lock clips to prevent urine spillage into the abdomen. We then traced these back to the hilum. We were able to identify two right renal vein. The veins were carefully dissected and freed from surrounding strctures. We were also able to visualize renal artery as well cephalad and posterior to the vein which was also dissected free from surrounding fat.It was in close proximity to a retro caval hilar mass and was encasing the proximat part of the artery.    Right Nephrectomy portion   We divided the renal artery and the Renal vein with a 45 mm Endo-JAMES stapler and secured the distal parts further with  Hem O- Lock Clips over the staple line in the cephalad aspect.The adrenal was seen and spared during the dissection.  At this point we began to mobilize the kidney posteriorly and free of all lateral attachments until it was free circumferentially and dropped it into the pelvis .     We then repositioned the robot to target the perivesical space to dissect out the distal Right ureter. We dissected the ureter free as it traversed over the iliac vessels, then down to the perivesical space, dividing the obliterated umbilical artery on that side. We dissected through the intramural ureter and sharply divided the  ureter with a cuff of bladder mucosa after taking a stay suture with a 3-0 vicryl suture. This suture was used to close off the cystotomy.A leak test was performed with 180 ml of NS. The entire kidney and ureter specimen was then placed into a 15mm endocatch bag. The robot was undocked.     The specimen was extracted through an extension of the Midline port . The incisions were irrigated. The extraction incision was closed with 2 running 0 PDS sutures . The skin incisions were all closed with 4-0 Monocryl. The wounds were dressed with surgical glue. The patient was then awakened and taken to the PACU in stable condition.   I was present for the critical portions of the procedure and immediately available for the entire procedure.    Stuart King MD  Department of Urology [facility]

## 2018-11-13 NOTE — IP AVS SNAPSHOT
[address] [address]    Phone:  [phone]                                       After Visit Summary   11/13/2018    Dimple Oviedo    MRN: [id number]           After Visit Summary Signature Page     I have received my discharge instructions, and my questions have been answered. I have discussed any challenges I see with this plan with the nurse or doctor.    ..........................................................................................................................................  Patient/Patient Representative Signature      ..........................................................................................................................................  Patient Representative Print Name and Relationship to Patient    ..................................................               ................................................  Date                                   Time    ..........................................................................................................................................  Reviewed by Signature/Title    ...................................................              ..............................................  Date                                               Time          22EPIC Rev 08/18

## 2018-11-13 NOTE — IP AVS SNAPSHOT
MRN:[id number]                      After Visit Summary   11/13/2018    Dimple Oviedo    MRN: [id number]           Thank you!     Thank you for choosing [facility] for your care. Our goal is always to provide you with excellent care. Hearing back from our patients is one way we can continue to improve our services. Please take a few minutes to complete the written survey that you may receive in the mail after you visit with us. Thank you!        Patient Information     Date Of Birth          6/23/1933        Designated Caregiver       Most Recent Value    Caregiver    Will someone help with your care after discharge? yes    Name of designated caregiver Jumana Parsons Vicki    Phone number of caregiver see file    Caregiver address see file      About your hospital stay     You were admitted on:  November 13, 2018 You last received care in the:  [address] [facility]    You were discharged on:  November 15, 2018        Reason for your hospital stay       On 11/13/18 Ms. Oviedo underwent   1. Cystoscopy and Stent Removal  2. Right robot assisted nephroureterectomy with Bladder Cuff  3. Rt Para caval and Precaval Lymphadenectomy   4. Biopsy of the Rt Hilar/ Retrocaval Mass  Surgeon: Dr. Stuart King                  Who to Call     For medical emergencies, please call 911.  For non-urgent questions about your medical care, please call your primary care provider or clinic, [phone]  For questions related to your surgery, please call your surgery clinic        Attending Provider     Provider Specialty    Stuart King MD Urology       Primary Care Provider Office Phone # Fax #    Pop Antonino Zapien [phone]      After Care Instructions     Activity       Your activity upon discharge: see discharge instructions            Diet       Follow this diet upon discharge: See discharge instructions            Discharge Instructions       Diet:  - Regular/ home  diet    Activity:   - No strenuous exercise for 4 weeks.   - No lifting, pushing, pulling more than 15 pounds for 4 weeks.   - Do not strain with bowel movements.   - Do not drive until you can press the brake pedal quickly and fully without pain.   - Do not operate a motor vehicle while taking narcotic pain medications.     Medications:   - PAIN: Oxycodone is a narcotic medication that has been prescribed for pain.  Narcotics will cause sleepiness and constipation and can become addictive, therefore it is best to stop or reduce them as soon as you can.  Any left over narcotics should be disposed of with an Authorized  for unneeded medications.  Contact your [facility]'s or [facility]'s household trash and recycling service to learn about medication disposal options and guidelines for your area.  If you decide to store this medication at home it should be kept in a locked cabinet to prevent access to children or visitors. To reduce your narcotic use, take Tylenol (acetaminophen 625mg) every 4-6 hours as needed.  This has been prescribed for you.  Do not take more than 4,000mg of Tylenol (acetaminophen/ APAP) from all sources in any 24 hour period since this can cause liver damage.  Avoid ibuprofen now that you only have one kidney. Ibuprofen can be hard on the kidneys. Never drive, operate machinery or drink alcoholic beverages while you are taking narcotic pain medications.     2) CONSTIPATION: Pericolace (senna/docusate sodium) can be taken twice daily for prevention of constipation since surgery, pain medications and bladder spasm medications can all make you constipated.  Please reduce or stop pericolace if you develop loose stools. Other over the counter solutions such as prune juice, miralax, fiber products, senna, and dulcolax can also be used. If you are taking the pericolace but still have not had a bowel movement in 3 days, start over-the-counter Milk of Magnesia taken twice daily until you have  a nice bowel movement.  Call the office with any concerns.     Tubes/Drains:  - You are going home with a Diehl catheter which will remain in place until your follow-up appointment. This catheter will not generally restrict your activities, but you should be careful if you are driving such that it does not become a distraction.    - Your nurse or  will provide written catheter care instructions for you to take home.  - Protect the catheter and treat it like an extension of your body - keep it secured to your leg and do not let it get caught, snagged, or tugged. The catheter tubing should remain tension-free and without kinks. In order to drain best, the tubing and bag should always be lower than your body.  - You may notice a little blood, small amount of white discharge, or caking at the catheter insertion site. This is normal but it can irritate the skin so it is best to wash this off in the daily shower. You are encouraged to wash the catheter tubing to keep it clean, and the urine drainage bag also can be gotten wet.     Incisions:   - You may shower and get incisions wet starting 48 hrs after surgery.  - Do not scrub incisions or submerge wounds in a bath, pool, hot tub, etc. for 2 weeks.   - Your incisions were closed with dissolvable suture that will not need to be removed.  Commonly, purple dermabond glue is applied over the top of the sutures.  Avoid using lotions or ointments on your incisions.    - Leave incisions open to air.  Cover with gauze only if needed for comfort or to protect clothing from drainage.                  Follow-up Appointments     Adult [facility] Follow-up and recommended labs and tests       Follow-Up:   - Call your primary care provider to touch base regarding your recent admission.     - We will try to arrange an appointment to get your catheter removed next week  - Follow up with Dr. King's team (Quyen Fregoso - 11/27) as scheduled. We will try to arrange an Oncology  appointment for the week when your daughter is home.   - Call or return sooner than your regularly scheduled visit if you develop any of the following:  Fever (greater than 101.3F), uncontrolled pain, uncontrolled nausea or vomiting, as well as increased redness, swelling, or drainage from your wound.  - Drink 2-3L of water a day to keep your urine clear to light pink in color. Some light blood in your urine can occur but should clear with increased water consumption as instructed.    - Call if blood in urine persists, becomes darker, you see clots or have difficulty urinating    Phone numbers:  - Monday through Friday 8am to 4:30pm: call [phone].    - Nights or weekends, call [phone] and ask the  to page the urology resident on call.   - For emergencies, always call 911      Appointments on [place] and/or [facility] (with [facility] or [facility] provider or service). Call [phone] if you haven't heard regarding these appointments within 7 days of discharge.                  Your next 10 appointments already scheduled     Nov 19, 2018  1:00 PM CST   (Arrive by 12:45 PM)   Return Visit with  Prostate Cancer Ctr Nurse   [facility]state [facility]rologic Cancers ([facility])    [address]  4th Floor  [facility] [address] [phone]            Nov 26, 2018  1:30 PM CST   US THYROID with UCUS2   [facility] ([facility])    [address]  1st Floor  [facility] [id number] [phone]           How do I prepare for my exam? (Food and drink instructions) No Food and Drink Restrictions.  How do I prepare for my exam? (Other instructions) You do not need to do anything special to prepare for your exam.  What should I wear: Wear comfortable clothes.  How long does the exam take: Most ultrasounds take 30 to 60 minutes.  What should I bring: Bring a list of your medicines, including vitamins,  minerals and over-the-counter drugs. It is safest to leave personal items at home.  Do I need a :  No  is needed.  What do I need to tell my doctor: Tell your doctor about any allergies you may have.  What should I do after the exam: No restrictions, You may resume normal activities.  What is this test: An ultrasound uses sound waves to make pictures of the body. Sound waves do not cause pain. The only discomfort may be the pressure of the wand against your skin or full bladder.  Who should I call with questions: If you have any questions, please call the Imaging Department where you will have your exam. Directions, parking instructions, and other information is available on our website, [email or web address].            Nov 26, 2018  2:15 PM CST   LAB with  LAB   [facility] ([facility])    [address] [place] [address] [phone]           Please do not eat 10-12 hours before your appointment if you are coming in fasting for labs on lipids, cholesterol, or glucose (sugar). This does not apply to pregnant women. Water, hot tea and black coffee (with nothing added) are okay. Do not drink other fluids, diet soda or chew gum.            Nov 27, 2018 11:30 AM CST   (Arrive by 11:15 AM)   Post-Op with ALYSSA Mejia   [facility]rolog[facility] ([facility])    [address] [place] [address]  [address] [facility] [address]   [phone]            Dec 04, 2018  1:30 PM CST   (Arrive by 1:15 PM)   RETURN ENDOCRINE with Dhara Meza MD   [facility]ocrinology ([facility])    [address] [place] [address]  [address] [facility] [address]   [phone]            Dec 06, 2018 11:00 AM CST   (Arrive by 10:45 AM)   Post-Op with Stuart King MD   [facility] ([facility])    [facility]  "[address] [address] [phone]              Pending Results     Date and Time Order Name Status Description    11/13/2018 1120 Surgical pathology exam Preliminary             Statement of Approval     Ordered          11/15/18 0934  I have reviewed and agree with all the recommendations and orders detailed in this document.  EFFECTIVE NOW     Approved and electronically signed by:  Geovanna Fregoso PA             Admission Information     Date & Time Provider Department Dept. Phone    11/13/2018 Weight, Stuart Blackmon MD [address] [facility] [phone]      Your Vitals Were     Blood Pressure Pulse Temperature Respirations Height Weight    146/56 (BP Location: Left arm) 69 99.4  F (37.4  C) (Oral) 18 1.448 m (4' 9\") 63.7 kg (140 lb 6.9 oz)    Pulse Oximetry BMI (Body Mass Index)                94% 30.39 kg/m2          MyChart Information     [employer] gives you secure access to your electronic health record. If you see a primary care provider, you can also send messages to your care team and make appointments. If you have questions, please call your primary care clinic.  If you do not have a primary care provider, please call [phone] and they will assist you.        Care EveryWhere ID     This is your Care EveryWhere ID. This could be used by other organizations to access your [facility] medical records  [id number]        Equal Access to Services     GUNNER RODRIGUEZ : Hadii shameka funko Sogabo, waaxda luqadaha, qaybta kaalmada maldonado chváez. So [facility] [phone].    ATENCIÓN: Si habla español, tiene a sierra disposición servicios gratuitos de asistencia lingüística. Llame al [phone].    We comply with applicable federal civil rights laws and [place] laws. We do not discriminate on the basis of race, color, national origin, age, disability, sex, sexual orientation, or gender identity.               Review of your medicines "      START taking        Dose / Directions    oxyCODONE IR 5 MG tablet   Commonly known as:  ROXICODONE   Used for:  Acute post-operative pain        Dose:  5 mg   Take 1 tablet (5 mg) by mouth every 4 hours as needed for moderate to severe pain   Quantity:  15 tablet   Refills:  0       senna-docusate 8.6-50 MG per tablet   Commonly known as:  SENOKOT-S;PERICOLACE   Used for:  Acute post-operative pain        Dose:  1 tablet   Take 1 tablet by mouth 2 times daily To prevent constipation   Quantity:  60 tablet   Refills:  0         CONTINUE these medicines which may have CHANGED, or have new prescriptions. If we are uncertain of the size of tablets/capsules you have at home, strength may be listed as something that might have changed.        Dose / Directions    acetaminophen 650 MG CR tablet   Commonly known as:  TYLENOL   This may have changed:    - when to take this  - reasons to take this   Used for:  Acute post-operative pain        Dose:  650 mg   Take 1 tablet (650 mg) by mouth every 8 hours as needed for pain   Quantity:  100 tablet   Refills:  0       alendronate 70 MG tablet   Commonly known as:  FOSAMAX   This may have changed:  See the new instructions.   Used for:  High risk medication use, Osteopenia        TAKE 1 TABLET EVERY 7 DAYS AT LEAST 60 MINUTES BEFORE BREAKFAST AS DIRECTED. SEE PACKAGE FOR ADDITIONAL INSTRUCTIONS   Quantity:  12 tablet   Refills:  0       irbesartan 300 MG tablet   Commonly known as:  AVAPRO   This may have changed:    - how much to take  - how to take this  - when to take this  - additional instructions   Used for:  Essential hypertension with goal blood pressure less than 140/90        TAKE 1 TABLET EVERY DAY   Quantity:  90 tablet   Refills:  2       omeprazole 20 MG CR capsule   Commonly known as:  priLOSEC   This may have changed:  when to take this   Used for:  Gastroesophageal reflux disease without esophagitis        Dose:  20 mg   Take 1 capsule (20 mg) by mouth daily    Quantity:  180 capsule   Refills:  3       propranolol 60 MG 24 hr capsule   Commonly known as:  INDERAL LA   This may have changed:  when to take this   Used for:  Nontoxic multinodular goiter        Dose:  60 mg   Take 1 capsule (60 mg) by mouth daily   Quantity:  90 capsule   Refills:  1         CONTINUE these medicines which have NOT CHANGED        Dose / Directions    aspirin 81 MG tablet        Dose:  81 mg   Take 81 mg by mouth every evening   Refills:  0       CALCIUM + D PO        Dose:  1 tablet   Take 1 tablet by mouth 2 times daily   Refills:  0       colestipol 1 g tablet   Commonly known as:  COLESTID        Dose:  1 g   Take 1 g by mouth 2 times daily TAKE OTHER MEDS 1 HOUR BEFORE OR 4 HOURS AFTER COLESID   Refills:  0       cycloSPORINE 0.05 % ophthalmic emulsion   Commonly known as:  RESTASIS        Dose:  1 drop   Place 1 drop into both eyes 2 times daily   Refills:  0       ferrous sulfate 325 (65 Fe) MG Tbec EC tablet        Dose:  325 mg   Take 325 mg by mouth daily   Refills:  0       Fish Oil 500 MG Caps        Dose:  1 capsule   Take 1 capsule by mouth 2 times daily   Refills:  0       furosemide 20 MG tablet   Commonly known as:  LASIX   Used for:  Stasis edema of both lower extremities, (HFpEF) heart failure with preserved ejection fraction (H)        Dose:  20 mg   Take 1 tablet (20 mg) by mouth every morning   Quantity:  90 tablet   Refills:  1       HYDROcodone-acetaminophen 5-325 MG per tablet   Commonly known as:  NORCO        Dose:  1 tablet   Take 1 tablet by mouth every 6 hours as needed for severe pain   Quantity:  10 tablet   Refills:  0       lovastatin 40 MG tablet   Commonly known as:  MEVACOR   Used for:  Hyperlipidemia LDL goal <130        TAKE 1 TABLET AT BEDTIME (HYPERLIPIDEMIA LDL GOAL BELOW 130)   Quantity:  90 tablet   Refills:  2       Melatonin 10 MG Tabs tablet        Dose:  10 mg   Take 10 mg by mouth as needed for sleep   Refills:  0       methimazole 5 MG tablet    Commonly known as:  TAPAZOLE   Used for:  Nontoxic multinodular goiter        Dose:  5 mg   Take 1 tablet (5 mg) by mouth 2 times daily   Quantity:  180 tablet   Refills:  3       nitroGLYcerin 0.4 MG sublingual tablet   Commonly known as:  NITROSTAT   Used for:  Elevated troponin        For chest pain place 1 tablet under the tongue every 5 minutes for 3 doses. If symptoms persist 5 minutes after 1st dose call 911.   Quantity:  25 tablet   Refills:  3       order for DME   Used for:  Lymphedema of both lower extremities        Equipment being ordered: Knee high compression for B LE 20-30mmHg, Velcro units for night time (consider hybrid sock as foot swelling is less than leg swelling), Donning device   Quantity:  1 each   Refills:  2       PROBIOTIC ADVANCED PO        Dose:  1 capsule   Take 1 capsule by mouth every morning   Refills:  0       rOPINIRole 0.25 MG tablet   Commonly known as:  REQUIP   Used for:  Restless legs syndrome        Dose:  0.25 mg   Take 1 tablet (0.25 mg) by mouth 2 times daily as needed (restless leg syndrome)   Quantity:  180 tablet   Refills:  1       sertraline 100 MG tablet   Commonly known as:  ZOLOFT   Used for:  THERON (generalized anxiety disorder)        Dose:  100 mg   Take 1 tablet (100 mg) by mouth every morning   Quantity:  90 tablet   Refills:  1       traZODone 50 MG tablet   Commonly known as:  DESYREL   Used for:  Insomnia, unspecified type        TAKE 1/2 TABLET(25 MG)  AT BEDTIME   Quantity:  45 tablet   Refills:  1            Where to get your medicines      These medications were sent to [facility] [place], [place] - [address]  [address] [address]     Phone:  [phone]     acetaminophen 650 MG CR tablet    senna-docusate 8.6-50 MG per tablet         Some of these will need a paper prescription and others can be bought over the counter. Ask your nurse if you have questions.     Bring a paper prescription for each of  these medications     oxyCODONE IR 5 MG tablet                Protect others around you: Learn how to safely use, store and throw away your medicines at www.disposemymeds.org.        Information about OPIOIDS     PRESCRIPTION OPIOIDS: WHAT YOU NEED TO KNOW   We gave you an opioid (narcotic) pain medicine. It is important to manage your pain, but opioids are not always the best choice. You should first try all the other options your care team gave you. Take this medicine for as short a time (and as few doses) as possible.    Some activities can increase your pain, such as bandage changes or therapy sessions. It may help to take your pain medicine 30 to 60 minutes before these activities. Reduce your stress by getting enough sleep, working on hobbies you enjoy and practicing relaxation or meditation. Talk to your care team about ways to manage your pain beyond prescription opioids.    These medicines have risks:    DO NOT drive when on new or higher doses of pain medicine. These medicines can affect your alertness and reaction times, and you could be arrested for driving under the influence (DUI). If you need to use opioids long-term, talk to your care team about driving.    DO NOT operate heavy machinery    DO NOT do any other dangerous activities while taking these medicines.    DO NOT drink any alcohol while taking these medicines.     If the opioid prescribed includes acetaminophen, DO NOT take with any other medicines that contain acetaminophen. Read all labels carefully. Look for the word  acetaminophen  or  Tylenol.  Ask your pharmacist if you have questions or are unsure.    You can get addicted to pain medicines, especially if you have a history of addiction (chemical, alcohol or substance dependence). Talk to your care team about ways to reduce this risk.    All opioids tend to cause constipation. Drink plenty of water and eat foods that have a lot of fiber, such as fruits, vegetables, prune juice, apple  juice and high-fiber cereal. Take a laxative (Miralax, milk of magnesia, Colace, Senna) if you don t move your bowels at least every other day. Other side effects include upset stomach, sleepiness, dizziness, throwing up, tolerance (needing more of the medicine to have the same effect), physical dependence and slowed breathing.    Store your pills in a secure place, locked if possible. We will not replace any lost or stolen medicine. If you don t finish your medicine, please throw away (dispose) as directed by your pharmacist. The [place] Pollution [employer] Agency has more information about safe disposal: https://www.pca.[facility].[place].us/living-green/managing-unwanted-medications             Medication List: This is a list of all your medications and when to take them. Check marks below indicate your daily home schedule. Keep this list as a reference.      Medications           Morning Afternoon Evening Bedtime As Needed    acetaminophen 650 MG CR tablet   Commonly known as:  TYLENOL   Take 1 tablet (650 mg) by mouth every 8 hours as needed for pain                                alendronate 70 MG tablet   Commonly known as:  FOSAMAX   TAKE 1 TABLET EVERY 7 DAYS AT LEAST 60 MINUTES BEFORE BREAKFAST AS DIRECTED. SEE PACKAGE FOR ADDITIONAL INSTRUCTIONS                                aspirin 81 MG tablet   Take 81 mg by mouth every evening                                CALCIUM + D PO   Take 1 tablet by mouth 2 times daily                                colestipol 1 g tablet   Commonly known as:  COLESTID   Take 1 g by mouth 2 times daily TAKE OTHER MEDS 1 HOUR BEFORE OR 4 HOURS AFTER COLESID   Last time this was given:  1 g on 11/15/2018 10:17 AM                                cycloSPORINE 0.05 % ophthalmic emulsion   Commonly known as:  RESTASIS   Place 1 drop into both eyes 2 times daily                                ferrous sulfate 325 (65 Fe) MG Tbec EC tablet   Take 325 mg by mouth daily                                 Fish Oil 500 MG Caps   Take 1 capsule by mouth 2 times daily                                furosemide 20 MG tablet   Commonly known as:  LASIX   Take 1 tablet (20 mg) by mouth every morning                                HYDROcodone-acetaminophen 5-325 MG per tablet   Commonly known as:  NORCO   Take 1 tablet by mouth every 6 hours as needed for severe pain                                irbesartan 300 MG tablet   Commonly known as:  AVAPRO   TAKE 1 TABLET EVERY DAY   Last time this was given:  300 mg on 11/15/2018  9:04 AM                                lovastatin 40 MG tablet   Commonly known as:  MEVACOR   TAKE 1 TABLET AT BEDTIME (HYPERLIPIDEMIA LDL GOAL BELOW 130)   Last time this was given:  40 mg on 11/14/2018 10:16 PM                                Melatonin 10 MG Tabs tablet   Take 10 mg by mouth as needed for sleep                                methimazole 5 MG tablet   Commonly known as:  TAPAZOLE   Take 1 tablet (5 mg) by mouth 2 times daily   Last time this was given:  5 mg on 11/15/2018  9:03 AM                                nitroGLYcerin 0.4 MG sublingual tablet   Commonly known as:  NITROSTAT   For chest pain place 1 tablet under the tongue every 5 minutes for 3 doses. If symptoms persist 5 minutes after 1st dose call 911.                                omeprazole 20 MG CR capsule   Commonly known as:  priLOSEC   Take 1 capsule (20 mg) by mouth daily   Last time this was given:  20 mg on 11/15/2018  9:04 AM                                order for DME   Equipment being ordered: Knee high compression for B LE 20-30mmHg, Velcro units for night time (consider hybrid sock as foot swelling is less than leg swelling), Donning device                                oxyCODONE IR 5 MG tablet   Commonly known as:  ROXICODONE   Take 1 tablet (5 mg) by mouth every 4 hours as needed for moderate to severe pain   Last time this was given:  5 mg on 11/15/2018  9:03 AM                                PROBIOTIC  ADVANCED PO   Take 1 capsule by mouth every morning                                propranolol 60 MG 24 hr capsule   Commonly known as:  INDERAL LA   Take 1 capsule (60 mg) by mouth daily   Last time this was given:  60 mg on 11/15/2018  9:04 AM                                rOPINIRole 0.25 MG tablet   Commonly known as:  REQUIP   Take 1 tablet (0.25 mg) by mouth 2 times daily as needed (restless leg syndrome)   Last time this was given:  0.25 mg on 11/14/2018  5:23 PM                                senna-docusate 8.6-50 MG per tablet   Commonly known as:  SENOKOT-S;PERICOLACE   Take 1 tablet by mouth 2 times daily To prevent constipation   Last time this was given:  1 tablet on 11/15/2018  9:04 AM                                sertraline 100 MG tablet   Commonly known as:  ZOLOFT   Take 1 tablet (100 mg) by mouth every morning   Last time this was given:  100 mg on 11/15/2018  9:04 AM                                traZODone 50 MG tablet   Commonly known as:  DESYREL   TAKE 1/2 TABLET(25 MG)  AT BEDTIME

## 2018-11-13 NOTE — ANESTHESIA PROCEDURE NOTES
Arterial Line Procedure Note  Staff:     Anesthesiologist:  TRUDY ROD  Location: In OR After Induction  Procedure Start/Stop Times:     patient identified, IV checked, site marked, risks and benefits discussed, informed consent, monitors and equipment checked, pre-op evaluation and at physician/surgeon's request      Correct Patient: Yes      Correct Position: Yes      Correct Site: Yes      Correct Procedure: Yes      Correct Laterality:  Yes    Site Marked:  Yes  Line Placement:     Procedure:  Arterial Line    Insertion Site:  Radial    Insertion laterality:  Right    Skin Prep: Chloraprep      Patient Prep: mask, sterile gloves, hat and hand hygiene      Local skin infiltration:  None    Ultrasound Guided?: No      Catheter size:  20 gauge, 12 cm    Cath secured with: suture      Dressing:  Tegaderm    Complications:  None obvious    Arterial waveform: Yes      IBP within 10% of NIBP: Yes

## 2018-11-14 ENCOUNTER — APPOINTMENT (OUTPATIENT)
Dept: PHYSICAL THERAPY | Facility: CLINIC | Age: 83
DRG: 657 | End: 2018-11-14
Attending: UROLOGY
Payer: MEDICARE

## 2018-11-14 LAB
ANION GAP SERPL CALCULATED.3IONS-SCNC: 6 MMOL/L (ref 3–14)
BUN SERPL-MCNC: 17 MG/DL (ref 7–30)
CALCIUM SERPL-MCNC: 8.2 MG/DL (ref 8.5–10.1)
CHLORIDE SERPL-SCNC: 104 MMOL/L (ref 94–109)
CO2 SERPL-SCNC: 27 MMOL/L (ref 20–32)
CREAT SERPL-MCNC: 1 MG/DL (ref 0.52–1.04)
ERYTHROCYTE [DISTWIDTH] IN BLOOD BY AUTOMATED COUNT: 15.9 % (ref 10–15)
GFR SERPL CREATININE-BSD FRML MDRD: 53 ML/MIN/1.7M2
GLUCOSE SERPL-MCNC: 98 MG/DL (ref 70–99)
HCT VFR BLD AUTO: 32.9 % (ref 35–47)
HGB BLD-MCNC: 10 G/DL (ref 11.7–15.7)
MCH RBC QN AUTO: 27 PG (ref 26.5–33)
MCHC RBC AUTO-ENTMCNC: 30.4 G/DL (ref 31.5–36.5)
MCV RBC AUTO: 89 FL (ref 78–100)
PLATELET # BLD AUTO: 207 10E9/L (ref 150–450)
POTASSIUM SERPL-SCNC: 4.7 MMOL/L (ref 3.4–5.3)
RBC # BLD AUTO: 3.71 10E12/L (ref 3.8–5.2)
SODIUM SERPL-SCNC: 136 MMOL/L (ref 133–144)
WBC # BLD AUTO: 11.3 10E9/L (ref 4–11)

## 2018-11-14 PROCEDURE — 40000894 ZZH STATISTIC OT IP EVAL DEFER: Performed by: OCCUPATIONAL THERAPIST

## 2018-11-14 PROCEDURE — 25000128 H RX IP 250 OP 636: Performed by: PHYSICIAN ASSISTANT

## 2018-11-14 PROCEDURE — 12000008 ZZH R&B INTERMEDIATE UMMC

## 2018-11-14 PROCEDURE — 25000132 ZZH RX MED GY IP 250 OP 250 PS 637: Mod: GY | Performed by: STUDENT IN AN ORGANIZED HEALTH CARE EDUCATION/TRAINING PROGRAM

## 2018-11-14 PROCEDURE — 97161 PT EVAL LOW COMPLEX 20 MIN: CPT | Mod: GP | Performed by: PHYSICAL THERAPIST

## 2018-11-14 PROCEDURE — 36415 COLL VENOUS BLD VENIPUNCTURE: CPT | Performed by: STUDENT IN AN ORGANIZED HEALTH CARE EDUCATION/TRAINING PROGRAM

## 2018-11-14 PROCEDURE — 80048 BASIC METABOLIC PNL TOTAL CA: CPT | Performed by: STUDENT IN AN ORGANIZED HEALTH CARE EDUCATION/TRAINING PROGRAM

## 2018-11-14 PROCEDURE — 40000193 ZZH STATISTIC PT WARD VISIT: Performed by: PHYSICAL THERAPIST

## 2018-11-14 PROCEDURE — 97116 GAIT TRAINING THERAPY: CPT | Mod: GP | Performed by: PHYSICAL THERAPIST

## 2018-11-14 PROCEDURE — 85027 COMPLETE CBC AUTOMATED: CPT | Performed by: STUDENT IN AN ORGANIZED HEALTH CARE EDUCATION/TRAINING PROGRAM

## 2018-11-14 PROCEDURE — 25000132 ZZH RX MED GY IP 250 OP 250 PS 637: Mod: GY

## 2018-11-14 PROCEDURE — A9270 NON-COVERED ITEM OR SERVICE: HCPCS | Mod: GY | Performed by: STUDENT IN AN ORGANIZED HEALTH CARE EDUCATION/TRAINING PROGRAM

## 2018-11-14 PROCEDURE — 97530 THERAPEUTIC ACTIVITIES: CPT | Mod: GP | Performed by: PHYSICAL THERAPIST

## 2018-11-14 PROCEDURE — 25000132 ZZH RX MED GY IP 250 OP 250 PS 637: Mod: GY | Performed by: PHYSICIAN ASSISTANT

## 2018-11-14 PROCEDURE — 25000128 H RX IP 250 OP 636: Performed by: STUDENT IN AN ORGANIZED HEALTH CARE EDUCATION/TRAINING PROGRAM

## 2018-11-14 PROCEDURE — A9270 NON-COVERED ITEM OR SERVICE: HCPCS | Mod: GY | Performed by: PHYSICIAN ASSISTANT

## 2018-11-14 RX ORDER — POLYETHYLENE GLYCOL 3350 17 G/17G
17 POWDER, FOR SOLUTION ORAL DAILY
Status: DISCONTINUED | OUTPATIENT
Start: 2018-11-14 | End: 2018-11-15 | Stop reason: HOSPADM

## 2018-11-14 RX ORDER — HYDROMORPHONE HCL/0.9% NACL/PF 0.2MG/0.2
0.2 SYRINGE (ML) INTRAVENOUS
Status: DISCONTINUED | OUTPATIENT
Start: 2018-11-14 | End: 2018-11-14 | Stop reason: CLARIF

## 2018-11-14 RX ORDER — METHIMAZOLE 5 MG/1
5 TABLET ORAL 2 TIMES DAILY
Status: DISCONTINUED | OUTPATIENT
Start: 2018-11-14 | End: 2018-11-15 | Stop reason: HOSPADM

## 2018-11-14 RX ORDER — OXYCODONE HYDROCHLORIDE 5 MG/1
5 TABLET ORAL EVERY 4 HOURS PRN
Qty: 15 TABLET | Refills: 0 | Status: SHIPPED | OUTPATIENT
Start: 2018-11-14 | End: 2019-01-02

## 2018-11-14 RX ORDER — OXYCODONE HYDROCHLORIDE 5 MG/1
5 TABLET ORAL EVERY 4 HOURS PRN
Status: DISCONTINUED | OUTPATIENT
Start: 2018-11-14 | End: 2018-11-14

## 2018-11-14 RX ORDER — AMOXICILLIN 250 MG
1 CAPSULE ORAL 2 TIMES DAILY
Qty: 60 TABLET | Refills: 0 | Status: SHIPPED | OUTPATIENT
Start: 2018-11-14 | End: 2019-10-04

## 2018-11-14 RX ORDER — SENNOSIDES 8.6 MG
650 CAPSULE ORAL EVERY 8 HOURS PRN
Qty: 100 TABLET | Refills: 0 | Status: ON HOLD | OUTPATIENT
Start: 2018-11-14 | End: 2020-11-10

## 2018-11-14 RX ORDER — LOVASTATIN 20 MG
40 TABLET ORAL AT BEDTIME
Status: DISCONTINUED | OUTPATIENT
Start: 2018-11-14 | End: 2018-11-15 | Stop reason: HOSPADM

## 2018-11-14 RX ORDER — AMOXICILLIN 250 MG
1 CAPSULE ORAL 2 TIMES DAILY
Status: DISCONTINUED | OUTPATIENT
Start: 2018-11-14 | End: 2018-11-15 | Stop reason: HOSPADM

## 2018-11-14 RX ADMIN — IRBESARTAN 300 MG: 300 TABLET, FILM COATED ORAL at 08:21

## 2018-11-14 RX ADMIN — SODIUM CHLORIDE: 9 INJECTION, SOLUTION INTRAVENOUS at 05:13

## 2018-11-14 RX ADMIN — SERTRALINE HYDROCHLORIDE 100 MG: 100 TABLET ORAL at 08:21

## 2018-11-14 RX ADMIN — POLYETHYLENE GLYCOL 3350 17 G: 17 POWDER, FOR SOLUTION ORAL at 09:46

## 2018-11-14 RX ADMIN — Medication 5000 UNITS: at 00:35

## 2018-11-14 RX ADMIN — Medication 5000 UNITS: at 08:22

## 2018-11-14 RX ADMIN — SENNOSIDES AND DOCUSATE SODIUM 1 TABLET: 8.6; 5 TABLET ORAL at 20:22

## 2018-11-14 RX ADMIN — OMEPRAZOLE 20 MG: 20 CAPSULE, DELAYED RELEASE ORAL at 08:21

## 2018-11-14 RX ADMIN — METHIMAZOLE 5 MG: 5 TABLET ORAL at 20:22

## 2018-11-14 RX ADMIN — ROPINIROLE HYDROCHLORIDE 0.25 MG: 0.25 TABLET, FILM COATED ORAL at 17:23

## 2018-11-14 RX ADMIN — Medication 5000 UNITS: at 17:22

## 2018-11-14 RX ADMIN — LOVASTATIN 40 MG: 20 TABLET ORAL at 22:16

## 2018-11-14 RX ADMIN — Medication 2.5 MG: at 09:47

## 2018-11-14 RX ADMIN — SENNOSIDES AND DOCUSATE SODIUM 1 TABLET: 8.6; 5 TABLET ORAL at 08:21

## 2018-11-14 RX ADMIN — Medication 5 MG: at 15:51

## 2018-11-14 RX ADMIN — METHIMAZOLE 5 MG: 5 TABLET ORAL at 09:46

## 2018-11-14 RX ADMIN — BENZOCAINE AND MENTHOL 1 LOZENGE: 15; 3.6 LOZENGE ORAL at 05:08

## 2018-11-14 RX ADMIN — SODIUM CHLORIDE 1 ML: 9 INJECTION, SOLUTION INTRAVENOUS at 20:39

## 2018-11-14 RX ADMIN — PROPRANOLOL HYDROCHLORIDE 60 MG: 60 CAPSULE, EXTENDED RELEASE ORAL at 08:21

## 2018-11-14 RX ADMIN — Medication 5 MG: at 22:15

## 2018-11-14 ASSESSMENT — ACTIVITIES OF DAILY LIVING (ADL)
ADLS_ACUITY_SCORE: 17
ADLS_ACUITY_SCORE: 15
ADLS_ACUITY_SCORE: 15

## 2018-11-14 NOTE — PLAN OF CARE
Problem: Patient Care Overview  Goal: Plan of Care/Patient Progress Review  Outcome: No Change   11/13/18 3431   OTHER   Plan Of Care Reviewed With patient   Plan of Care Review   Progress no change     Temp: 97.9  F (36.6  C) Temp src: Oral BP: 117/42   Heart Rate: 63 Resp: 14 SpO2: 98 % O2 Device: Nasal cannula Oxygen Delivery: 2 LPM     Patient is S/P Right Robotic Nephroureterectomy, Day # 0:    -pain well-controlled with PCA Dilaudid set at 0.1 mg every 10 mins  -tolerating clears without nausea  -dangled @ 22:00  -abdominal incision with liquid bandage   -goldman patent with adequate output  -alert and oriented x 4  -NS @ 100 ml/hr    Continue with post of cares.

## 2018-11-14 NOTE — PROGRESS NOTES
Care Coordinator - Discharge Planning    Admission Date/Time:  11/13/2018  Attending MD:  Stuart King*     Data  Date of initial CC assessment:  11/14/2018  Chart reviewed, discussed with interdisciplinary team.   Patient was admitted for:   1. Acute post-operative pain         Assessment   Concerns with insurance coverage for discharge needs: None.  Current Living Situation: Patient lives alone.  Support System: Supportive and Involved  Services Involved: [facility]  Transportation at Discharge: Family or friend will provide  Transportation to Medical Appointments:    - Name of caregiver: Day [place], Daughter In Law  Barriers to Discharge: None    Coordination of Care and Referrals: No RN interventions identified.    Per PA, patient will likely discharge today with goldman catheter. Writer met with the patient to review the role of the care coordinator and assess discharge needs. Per patient, she is discharging to her son's home and her daughter in law is a nurse. Patient declined the need for a home health RN, stating her daughter in law will be able to assist as needed. Patient had no other questions or concerns at the time of our conversation.       Plan  Anticipated Discharge Date:  11/14/2018  Anticipated Discharge Plan:  Home with assist from family and clinic follow up as recommended    CTS Handoff completed:  YES    Bere Webber RNCC

## 2018-11-14 NOTE — PLAN OF CARE
Problem: Patient Care Overview  Goal: Plan of Care/Patient Progress Review    7B: OT orders acknowledged. Per chart review and discussion with PT, pt is moving around with SBA following surgery and has no IP OT needs. PT to follow to progress strength/endurance while IP. Will defer OT eval and complete orders - please reorder if situation changes.

## 2018-11-14 NOTE — PROGRESS NOTES
11/14/18 1119   Quick Adds   Type of Visit Initial PT Evaluation       Present no   Language English   Living Environment   Lives With alone  (assist from son and daughter in-law)   Living Arrangements other (see comments)  (will be staying at son and daughter-in-laws home. )   Home Accessibility tub/shower is not walk in   Number of Stairs to Enter Home 0   Number of Stairs Within Home 0   Stair Railings at Home other (see comments)  (NA)   Transportation Available family or friend will provide;car   Living Environment Comment Pt will be able to stay on main level.    Self-Care   Dominant Hand right   Usual Activity Tolerance moderate   Current Activity Tolerance fair   Regular Exercise other (see comments)  (exercises for edema)   Equipment Currently Used at Home walker, rolling   Activity/Exercise/Self-Care Comment Pt reported being independent with all ADls at baseline, but gets assist with cleaning.    Functional Level Prior   Ambulation 1-->assistive equipment   Transferring 1-->assistive equipment   Toileting 0-->independent   Bathing 0-->independent   Dressing 0-->independent   Eating 0-->independent   Communication 0-->understands/communicates without difficulty   Swallowing 0-->swallows foods/liquids without difficulty   Cognition 0 - no cognition issues reported   Fall history within last six months no   Which of the above functional risks had a recent onset or change? ambulation;transferring   Prior Functional Level Comment Pt reported not using a FWW within her apartment, but uses it outside of her aparment.    General Information   Onset of Illness/Injury or Date of Surgery - Date 11/13/18   Referring Physician Laney Montelongo   Patient/Family Goals Statement Goal not verbalized.    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  See chart for PMH.    Precautions/Limitations fall precautions;abdominal precautions   Weight-Bearing Status - LUE other (see comments)  (10lbs)   Weight-Bearing Status - RUE other (see comments)  (10lbs)   Weight-Bearing Status - LLE full weight-bearing   Weight-Bearing Status - RLE full weight-bearing   Heart Disease Risk Factors Age   General Observations Pt supine in bed at start of PT evaluation.    General Info Comments Pt currently getting IV fluids, capnography, and goldman catheter.     Cognitive Status Examination   Orientation other (see comments)  (Not tested)   Level of Consciousness alert   Follows Commands and Answers Questions 100% of the time;able to follow multistep instructions   Personal Safety and Judgment intact   Memory intact   Pain Assessment   Patient Currently in Pain Yes, see Vital Sign flowsheet   Integumentary/Edema   Integumentary/Edema other (describe)   Integumentary/Edema Comments Incision not observed.  Pt has edema at baseline and has compression stockings in place.    Posture    Posture Forward head position;Protracted shoulders   Range of Motion (ROM)   ROM Comment Pt demonstrated functional LE ROM for bed mobility, transfers, and gait.    Strength   Strength Comments LE strength not formally tested.  Pt was able to lift LEs off of bed in order to kim slippers. Pt demonstrated functional strength for transfers and gait.    Bed Mobility   Bed Mobility Comments Pt needed min A to roll to left side, use of bed rail.  Sidelying to sitting at EOB with min A x 1 at trunk.  Sitting to supine not assessed secondary to pt sitting up in chair.     Transfer Skills   Transfer Comments Sit to standing from EOB with FWW and SBA x 1.   Standing to sitting in chair with FWW and SBA x 1.    Gait   Gait Comments Pt ambulated approximately 200' with FWW and SBA x 1.     Balance   Balance Comments Pt demonstrated good sitting balance at EOB and good standing balance with  "FWW.     Sensory Examination   Sensory Perception Comments Pt has no c/o numbness/tingling along bilateral LEs.    General Therapy Interventions   Planned Therapy Interventions bed mobility training;transfer training;gait training   Intervention Comments Bed mobility, transfers, and gait initiated.    Clinical Impression   Criteria for Skilled Therapeutic Intervention yes, treatment indicated   PT Diagnosis Impaired mobility.    Influenced by the following impairments Post-op pain and s/p right robot assisted nephrourectectomy with intra op lymph node biopsy.    Functional limitations due to impairments Impaired bed mobility, transfers, and gait.    Clinical Presentation Stable/Uncomplicated   Clinical Presentation Rationale Pt is a 84 y/o female s/p rigth rotob assisted nephrourectectomy with intra op lymph node biopsy.   Pt is independent with all mobility at baseline, uses walker outside of her apartment and no AD within her apartment.  Pt was independent with all ADLs at baseline but does get assist with cleaning.    Clinical Decision Making (Complexity) Low complexity   Therapy Frequency` daily   Predicted Duration of Therapy Intervention (days/wks) 3 days   Anticipated Equipment Needs at Discharge other (see comments)  (No equipment needs, has FWW at home and shower chair. )   Anticipated Discharge Disposition Home with Assist   Risk & Benefits of therapy have been explained Yes   Patient, Family & other staff in agreement with plan of care Yes   [facility] [employer] TM \"6 Clicks\"   2016, Trustees of [facility], under license to [employer].  All rights reserved.   6 Clicks Short Forms Basic Mobility Inpatient Short Form   [facility] AM-PAC  \"6 Clicks\" V.2 Basic Mobility Inpatient Short Form   1. Turning from your back to your side while in a flat bed without using bedrails? 3 - A Little   2. Moving from lying on your back to sitting on the side of a flat bed without using bedrails? 3 - A " Little   3. Moving to and from a bed to a chair (including a wheelchair)? 4 - None   4. Standing up from a chair using your arms (e.g., wheelchair, or bedside chair)? 4 - None   5. To walk in hospital room? 4 - None   6. Climbing 3-5 steps with a railing? 3 - A Little   Basic Mobility Raw Score (Score out of 24.Lower scores equate to lower levels of function) 21   Total Evaluation Time   Total Evaluation Time (Minutes) 12

## 2018-11-14 NOTE — PLAN OF CARE
Problem: Patient Care Overview  Goal: Plan of Care/Patient Progress Review  Discharge Planner PT   Patient plan for discharge: Home with family for assist.   Current status: PT evaluation completed. Pt was educated on post-op precautions and bed mobility/transfer technique.  Pt needed min A with rolling to left side and then to sit at EOB using log rolling technique.  Pt will have assist as needed from her family and they will be able to assist with bed mobility.  Sit to/from standing with FWW and SBA x 1.  Pt ambulated approximately 200' with FWW and SBA x 1.  Pt c/o feeling lightheaded during gait training, but VSS and lightheaded feeling subsided after seated rest break.  Pt was educated on dressing techniques.    Barriers to return to prior living situation: No barriers to discharge home.   Recommendations for discharge: Home with family for assist.   Rationale for recommendations: No home or OP PT needs.        Entered by: Shavon Shipley 11/14/2018 12:54 PM

## 2018-11-14 NOTE — PLAN OF CARE
Problem: Patient Care Overview  Goal: Plan of Care/Patient Progress Review  Patient in on O2 at 1 L/nc, VSS. Clear LS. +BS, (-) flatus, 4 lap sites and mid incision cdi, derma bonded, +erythema. Pain is well controlled with PCA Dilaudid 0.1 mg Q 10 mins. Denies nausea. C/o sore throat, got order for Lozenge. Diehl in place, marginal urine output. Able to sleep this shift. Continue poc.  Vitals:    11/13/18 2020 11/13/18 2120 11/13/18 2228 11/14/18 0423   BP: (!) 124/39 130/44 117/42 (!) 116/38   BP Location:   Right arm Right arm   Cuff Size:       Resp: 14 14 14 16   Temp:   97.9  F (36.6  C) 97.6  F (36.4  C)   TempSrc:   Oral Oral   SpO2: 97% 98% 98% 98%   Weight:       Height:           Addendum: 0630: PCA removed, dc'd as ordered. On regular diet.

## 2018-11-14 NOTE — PROGRESS NOTES
"Urology Daily Progress Note    24 hour events/Subjective:     - No acute events overnight   - Pain well controlled on current regimen   - Tolerating CLD ; no nausea or vomiting, no flatus yet   - up to edge of bed but has not ambulated yet    O:  Vitals: BP (!) 116/38 (BP Location: Right arm)  Temp 97.6  F (36.4  C) (Oral)  Resp 16  Ht 1.448 m (4' 9\")  Wt 63.7 kg (140 lb 6.9 oz)  SpO2 98%  BMI 30.39 kg/m2  General: Alert, interactive, in NAD  Resp: Non-labored breathing on RA  Abdomen: Soft, appropriately tender, incisions C/D/I   Ext: Warm and well perfused   Diehl/Urostomy: Clear yellow     I/O  UOP  1060/300    Labs  Pending this am    Heme:  Recent Labs  Lab 11/13/18  1346   WBC 13.1*   HGB 11.2*        Chem:  Recent Labs  Lab 11/13/18  1346 11/13/18  0726   POTASSIUM 4.0 4.4   CR 0.85  --        Assessment/Plan  85 year old y/o female POD#1  s/p right robot assisted nephrouretectomy, with intra op lymph node bx revealing metastatic UCC.   Doing well     NEURO Pain well controlled on  scheduled APAP and  Dilaudid PCA.  Changes:  Stop PCA, start PRN oxycodone and dilaudid   CV HDS.    PULM Aggressive pulmonary toilet and I/S.   FEN/GI IVF: 100 ml of NS /hr  Diet: ADAT to regular   Provide bowel regimen    Diehl to remain for 1 week   HEME Hgb as above.    ID Afebrile, no leukocytosis.    Antibiotics: Completed periop antibiotics    ENDO No issues   ACTIVITY Up as tolerated  PT/OT consulted  Ambulate at least TID    PPx SCDs, ambulation   DISPO Pending          Seen and examined with chief resident, to be discussed with Dr. King    --    Laeny Mota MD MS  Urology Resident    For questions re this patient:  see \"contacting urology team\" instructions below, or refer to the call sheet           Contacting the Urology Team     Please use the following job codes to reach the Urology Team. Note that you must use an in house phone and that job codes cannot receive text pages.     On weekdays, dial [phone] " (or star-star-star [phone] on the new [place] telephones) then [phone] to reach the Adult Urology resident or PA on call    On weekdays, dial [phone] (or star-star-star [phone] on the new [place] telephones) then [phone] to reach the Pediatric Urology resident    On weeknights and weekends, dial [phone] (or star-star-star [phone] on the new [place] telephones) then [phone] to reach the Urology resident on call (for both Adult and Pediatrics)

## 2018-11-14 NOTE — PROGRESS NOTES
Patient on room air. Tolerating food and liquids well. BS hypoactive, not passing gas. Incision site c/d/i with redness surrounding. Pain controlled with 2.5mg oxycodone at 0945. Diehl output 275mL    Mireya Multani, SN

## 2018-11-15 ENCOUNTER — PRE VISIT (OUTPATIENT)
Dept: UROLOGY | Facility: CLINIC | Age: 83
End: 2018-11-15

## 2018-11-15 VITALS
BODY MASS INDEX: 30.3 KG/M2 | DIASTOLIC BLOOD PRESSURE: 56 MMHG | RESPIRATION RATE: 18 BRPM | HEIGHT: 57 IN | WEIGHT: 140.43 LBS | SYSTOLIC BLOOD PRESSURE: 146 MMHG | OXYGEN SATURATION: 94 % | HEART RATE: 69 BPM | TEMPERATURE: 99.4 F

## 2018-11-15 LAB
ANION GAP SERPL CALCULATED.3IONS-SCNC: 3 MMOL/L (ref 3–14)
BUN SERPL-MCNC: 13 MG/DL (ref 7–30)
CALCIUM SERPL-MCNC: 8.2 MG/DL (ref 8.5–10.1)
CHLORIDE SERPL-SCNC: 104 MMOL/L (ref 94–109)
CO2 SERPL-SCNC: 28 MMOL/L (ref 20–32)
CREAT SERPL-MCNC: 1.05 MG/DL (ref 0.52–1.04)
ERYTHROCYTE [DISTWIDTH] IN BLOOD BY AUTOMATED COUNT: 15.8 % (ref 10–15)
GFR SERPL CREATININE-BSD FRML MDRD: 50 ML/MIN/1.7M2
GLUCOSE SERPL-MCNC: 94 MG/DL (ref 70–99)
HCT VFR BLD AUTO: 33 % (ref 35–47)
HGB BLD-MCNC: 10.1 G/DL (ref 11.7–15.7)
MCH RBC QN AUTO: 27.4 PG (ref 26.5–33)
MCHC RBC AUTO-ENTMCNC: 30.6 G/DL (ref 31.5–36.5)
MCV RBC AUTO: 89 FL (ref 78–100)
PLATELET # BLD AUTO: 230 10E9/L (ref 150–450)
POTASSIUM SERPL-SCNC: 4.7 MMOL/L (ref 3.4–5.3)
RBC # BLD AUTO: 3.69 10E12/L (ref 3.8–5.2)
SODIUM SERPL-SCNC: 135 MMOL/L (ref 133–144)
WBC # BLD AUTO: 10 10E9/L (ref 4–11)

## 2018-11-15 PROCEDURE — 40000141 ZZH STATISTIC PERIPHERAL IV START W/O US GUIDANCE

## 2018-11-15 PROCEDURE — 25000132 ZZH RX MED GY IP 250 OP 250 PS 637: Mod: GY | Performed by: PHYSICIAN ASSISTANT

## 2018-11-15 PROCEDURE — 25000128 H RX IP 250 OP 636: Performed by: STUDENT IN AN ORGANIZED HEALTH CARE EDUCATION/TRAINING PROGRAM

## 2018-11-15 PROCEDURE — 85027 COMPLETE CBC AUTOMATED: CPT | Performed by: PHYSICIAN ASSISTANT

## 2018-11-15 PROCEDURE — 36415 COLL VENOUS BLD VENIPUNCTURE: CPT | Performed by: PHYSICIAN ASSISTANT

## 2018-11-15 PROCEDURE — 80048 BASIC METABOLIC PNL TOTAL CA: CPT | Performed by: PHYSICIAN ASSISTANT

## 2018-11-15 PROCEDURE — 25000132 ZZH RX MED GY IP 250 OP 250 PS 637: Mod: GY | Performed by: STUDENT IN AN ORGANIZED HEALTH CARE EDUCATION/TRAINING PROGRAM

## 2018-11-15 PROCEDURE — A9270 NON-COVERED ITEM OR SERVICE: HCPCS | Mod: GY | Performed by: STUDENT IN AN ORGANIZED HEALTH CARE EDUCATION/TRAINING PROGRAM

## 2018-11-15 PROCEDURE — A9270 NON-COVERED ITEM OR SERVICE: HCPCS | Mod: GY | Performed by: PHYSICIAN ASSISTANT

## 2018-11-15 RX ADMIN — MONTELUKAST SODIUM 1 G: 4 TABLET, CHEWABLE ORAL at 10:17

## 2018-11-15 RX ADMIN — OMEPRAZOLE 20 MG: 20 CAPSULE, DELAYED RELEASE ORAL at 09:04

## 2018-11-15 RX ADMIN — Medication 5000 UNITS: at 10:27

## 2018-11-15 RX ADMIN — PROPRANOLOL HYDROCHLORIDE 60 MG: 60 CAPSULE, EXTENDED RELEASE ORAL at 09:04

## 2018-11-15 RX ADMIN — SERTRALINE HYDROCHLORIDE 100 MG: 100 TABLET ORAL at 09:04

## 2018-11-15 RX ADMIN — SENNOSIDES AND DOCUSATE SODIUM 1 TABLET: 8.6; 5 TABLET ORAL at 09:04

## 2018-11-15 RX ADMIN — IRBESARTAN 300 MG: 300 TABLET, FILM COATED ORAL at 09:04

## 2018-11-15 RX ADMIN — Medication 5000 UNITS: at 00:29

## 2018-11-15 RX ADMIN — METHIMAZOLE 5 MG: 5 TABLET ORAL at 09:03

## 2018-11-15 RX ADMIN — Medication 5 MG: at 09:03

## 2018-11-15 RX ADMIN — POLYETHYLENE GLYCOL 3350 17 G: 17 POWDER, FOR SOLUTION ORAL at 09:03

## 2018-11-15 ASSESSMENT — ACTIVITIES OF DAILY LIVING (ADL)
ADLS_ACUITY_SCORE: 15

## 2018-11-15 NOTE — PLAN OF CARE
Problem: Patient Care Overview  Goal: Plan of Care/Patient Progress Review  Started care of pt at 1915, pt aox4, tempt: 99.5, dark prosper urine output of 230 of start of shift, at 2230 urine output of 250 more clear compare to 1915. Encouraged pt the need of drinking fluid, teaching provided on pain medication. Lise. Binder applied.

## 2018-11-15 NOTE — PLAN OF CARE
Problem: Patient Care Overview  Goal: Plan of Care/Patient Progress Review  PT 7B: PT session cancelled secondary to pt discharged home.      Physical Therapy Discharge Summary    Reason for therapy discharge:    Discharged to home.    Progress towards therapy goal(s). See goals on Care Plan in [facility] electronic health record for goal details.  Goals partially met.  Barriers to achieving goals:   discharge from facility.    Therapy recommendation(s):    No further therapy is recommended.

## 2018-11-15 NOTE — DISCHARGE SUMMARY
[facility] UroDischarge Summary    Patient: Dimple Oviedo    MRN: [id number]   : 1933         Date of Admission:  2018   Date of Discharge::  11/15/2018  Admitting Physician:  Stuart King MD  Discharge Physician:  Geovanna Fregoso PA-C             Admission Diagnoses:   Metastatic Urothelial Cancer     Past Medical History:   Diagnosis Date     Calculus of kidney      Esophageal reflux      GERD (gastroesophageal reflux disease)      Hyperlipidemia LDL goal <130 2010     Malignant melanoma of skin of trunk, except scrotum (H)      Nonspecific abnormal finding     has living will  -      Nontoxic multinodular goiter     no further eval /tx rec per pt     Osteopenia      Other psoriasis      Personal history of colonic polyps      PMR (polymyalgia rheumatica) (H)      Stress-induced cardiomyopathy      Undiagnosed cardiac murmurs      Unspecified constipation      Unspecified essential hypertension      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             Discharge Diagnosis:   Metastatic Urothelial Cancer     Past Medical History:   Diagnosis Date     Calculus of kidney      Esophageal reflux      GERD (gastroesophageal reflux disease)      Hyperlipidemia LDL goal <130 2010     Malignant melanoma of skin of trunk, except scrotum (H)      Nonspecific abnormal finding     has living will  -      Nontoxic multinodular goiter     no further eval /tx rec per pt     Osteopenia      Other psoriasis      Personal history of colonic polyps      PMR (polymyalgia rheumatica) (H)      Stress-induced cardiomyopathy      Undiagnosed cardiac murmurs      Unspecified constipation      Unspecified essential hypertension      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          Procedures:   Procedure(s): 18 -   1. Cystoscopy and Stent Removal  2. Right robot assisted nephroureterectomy with Bladder Cuff  3. Rt Para caval and Precaval Lymphadenectomy   4. Biopsy of the Rt Hilar/  Retrocaval Mass  Dr. Stuart King (Urology)            Medications Prior to Admission:     Prescriptions Prior to Admission   Medication Sig Dispense Refill Last Dose     alendronate (FOSAMAX) 70 MG tablet TAKE 1 TABLET EVERY 7 DAYS AT LEAST 60 MINUTES BEFORE BREAKFAST AS DIRECTED. SEE PACKAGE FOR ADDITIONAL INSTRUCTIONS (Patient taking differently: TAKE 1 TABLET EVERY 7 DAYS AT LEAST 60 MINUTES BEFORE BREAKFAST AS DIRECTED. (Tuesdays0) 12 tablet 0 11/12/2018 at 0800     Calcium Citrate-Vitamin D (CALCIUM + D PO) Take 1 tablet by mouth 2 times daily    11/12/2018 at 0800     cycloSPORINE (RESTASIS) 0.05 % ophthalmic emulsion Place 1 drop into both eyes 2 times daily   Past Week at Unknown time     ferrous sulfate 325 (65 Fe) MG TBEC EC tablet Take 325 mg by mouth daily   11/12/2018 at 0800     furosemide (LASIX) 20 MG tablet Take 1 tablet (20 mg) by mouth every morning 90 tablet 1 11/12/2018 at 0800     irbesartan (AVAPRO) 300 MG tablet TAKE 1 TABLET EVERY DAY (Patient taking differently: Take 300 mg by mouth every morning ) 90 tablet 2 11/12/2018 at 0800     lovastatin (MEVACOR) 40 MG tablet TAKE 1 TABLET AT BEDTIME (HYPERLIPIDEMIA LDL GOAL BELOW 130) 90 tablet 2 11/12/2018 at 2000     Melatonin 10 MG TABS tablet Take 10 mg by mouth as needed for sleep    Past Week at Unknown time     methimazole (TAPAZOLE) 5 MG tablet Take 1 tablet (5 mg) by mouth 2 times daily 180 tablet 3 11/13/2018 at 0500     Omega-3 Fatty Acids (FISH OIL) 500 MG CAPS Take 1 capsule by mouth 2 times daily    Past Week at Unknown time     omeprazole (PRILOSEC) 20 MG CR capsule Take 1 capsule (20 mg) by mouth daily (Patient taking differently: Take 20 mg by mouth every morning ) 180 capsule 3 11/13/2018 at 0500     Probiotic Product (PROBIOTIC ADVANCED PO) Take 1 capsule by mouth every morning    Past Week at Unknown time     propranolol (INDERAL LA) 60 MG 24 hr capsule Take 1 capsule (60 mg) by mouth daily (Patient taking differently:  Take 60 mg by mouth every morning ) 90 capsule 1 11/13/2018 at 0500     rOPINIRole (REQUIP) 0.25 MG tablet Take 1 tablet (0.25 mg) by mouth 2 times daily as needed (restless leg syndrome) 180 tablet 1 11/12/2018 at 0800     sertraline (ZOLOFT) 100 MG tablet Take 1 tablet (100 mg) by mouth every morning 90 tablet 1 11/13/2018 at 0500     traZODone (DESYREL) 50 MG tablet TAKE 1/2 TABLET(25 MG)  AT BEDTIME 45 tablet 1 Past Week at Unknown time     aspirin 81 MG tablet Take 81 mg by mouth every evening    11/7/2018     colestipol (COLESTID) 1 g tablet Take 1 g by mouth 2 times daily TAKE OTHER MEDS 1 HOUR BEFORE OR 4 HOURS AFTER COLESID   Unknown at Unknown time     HYDROcodone-acetaminophen (NORCO) 5-325 MG per tablet Take 1 tablet by mouth every 6 hours as needed for severe pain (Patient not taking: Reported on 10/31/2018) 10 tablet 0 More than a month at Unknown time     nitroGLYcerin (NITROSTAT) 0.4 MG sublingual tablet For chest pain place 1 tablet under the tongue every 5 minutes for 3 doses. If symptoms persist 5 minutes after 1st dose call 911. (Patient not taking: Reported on 10/31/2018) 25 tablet 3 More than a month at Unknown time     order for DME Equipment being ordered: Knee high compression for B LE 20-30mmHg, Velcro units for night time (consider hybrid sock as foot swelling is less than leg swelling), Donning device 1 each 2 Taking             Discharge Medications:     Current Discharge Medication List      START taking these medications    Details   oxyCODONE IR (ROXICODONE) 5 MG tablet Take 1 tablet (5 mg) by mouth every 4 hours as needed for moderate to severe pain  Qty: 15 tablet, Refills: 0    Associated Diagnoses: Acute post-operative pain      senna-docusate (SENOKOT-S;PERICOLACE) 8.6-50 MG per tablet Take 1 tablet by mouth 2 times daily To prevent constipation  Qty: 60 tablet, Refills: 0    Associated Diagnoses: Acute post-operative pain         CONTINUE these medications which have CHANGED     Details   acetaminophen (TYLENOL) 650 MG CR tablet Take 1 tablet (650 mg) by mouth every 8 hours as needed for pain  Qty: 100 tablet, Refills: 0    Associated Diagnoses: Acute post-operative pain         CONTINUE these medications which have NOT CHANGED    Details   alendronate (FOSAMAX) 70 MG tablet TAKE 1 TABLET EVERY 7 DAYS AT LEAST 60 MINUTES BEFORE BREAKFAST AS DIRECTED. SEE PACKAGE FOR ADDITIONAL INSTRUCTIONS  Qty: 12 tablet, Refills: 0    Associated Diagnoses: High risk medication use; Osteopenia      Calcium Citrate-Vitamin D (CALCIUM + D PO) Take 1 tablet by mouth 2 times daily       cycloSPORINE (RESTASIS) 0.05 % ophthalmic emulsion Place 1 drop into both eyes 2 times daily      ferrous sulfate 325 (65 Fe) MG TBEC EC tablet Take 325 mg by mouth daily      furosemide (LASIX) 20 MG tablet Take 1 tablet (20 mg) by mouth every morning  Qty: 90 tablet, Refills: 1    Associated Diagnoses: Stasis edema of both lower extremities; (HFpEF) heart failure with preserved ejection fraction (H)      irbesartan (AVAPRO) 300 MG tablet TAKE 1 TABLET EVERY DAY  Qty: 90 tablet, Refills: 2    Associated Diagnoses: Essential hypertension with goal blood pressure less than 140/90      lovastatin (MEVACOR) 40 MG tablet TAKE 1 TABLET AT BEDTIME (HYPERLIPIDEMIA LDL GOAL BELOW 130)  Qty: 90 tablet, Refills: 2    Associated Diagnoses: Hyperlipidemia LDL goal <130      Melatonin 10 MG TABS tablet Take 10 mg by mouth as needed for sleep       methimazole (TAPAZOLE) 5 MG tablet Take 1 tablet (5 mg) by mouth 2 times daily  Qty: 180 tablet, Refills: 3    Associated Diagnoses: Nontoxic multinodular goiter      Omega-3 Fatty Acids (FISH OIL) 500 MG CAPS Take 1 capsule by mouth 2 times daily       omeprazole (PRILOSEC) 20 MG CR capsule Take 1 capsule (20 mg) by mouth daily  Qty: 180 capsule, Refills: 3    Associated Diagnoses: Gastroesophageal reflux disease without esophagitis      Probiotic Product (PROBIOTIC ADVANCED PO) Take 1 capsule by  mouth every morning       propranolol (INDERAL LA) 60 MG 24 hr capsule Take 1 capsule (60 mg) by mouth daily  Qty: 90 capsule, Refills: 1    Associated Diagnoses: Nontoxic multinodular goiter      rOPINIRole (REQUIP) 0.25 MG tablet Take 1 tablet (0.25 mg) by mouth 2 times daily as needed (restless leg syndrome)  Qty: 180 tablet, Refills: 1    Associated Diagnoses: Restless legs syndrome      sertraline (ZOLOFT) 100 MG tablet Take 1 tablet (100 mg) by mouth every morning  Qty: 90 tablet, Refills: 1    Associated Diagnoses: THERON (generalized anxiety disorder)      traZODone (DESYREL) 50 MG tablet TAKE 1/2 TABLET(25 MG)  AT BEDTIME  Qty: 45 tablet, Refills: 1    Associated Diagnoses: Insomnia, unspecified type      aspirin 81 MG tablet Take 81 mg by mouth every evening       colestipol (COLESTID) 1 g tablet Take 1 g by mouth 2 times daily TAKE OTHER MEDS 1 HOUR BEFORE OR 4 HOURS AFTER COLESID      HYDROcodone-acetaminophen (NORCO) 5-325 MG per tablet Take 1 tablet by mouth every 6 hours as needed for severe pain  Qty: 10 tablet, Refills: 0      nitroGLYcerin (NITROSTAT) 0.4 MG sublingual tablet For chest pain place 1 tablet under the tongue every 5 minutes for 3 doses. If symptoms persist 5 minutes after 1st dose call 911.  Qty: 25 tablet, Refills: 3    Associated Diagnoses: Elevated troponin      order for DME Equipment being ordered: Knee high compression for B LE 20-30mmHg, Velcro units for night time (consider hybrid sock as foot swelling is less than leg swelling), Donning device  Qty: 1 each, Refills: 2    Associated Diagnoses: Lymphedema of both lower extremities                   Consultations:   Consultation during this admission received:   PHYSICAL THERAPY ADULT IP CONSULT  OCCUPATIONAL THERAPY ADULT IP CONSULT  VASCULAR ACCESS CARE ADULT IP CONSULT          Brief History of Illness:   Reason for admission requiring a surgical or invasive procedure:   Urothelial Cancer    The patient underwent the following  procedure(s):   See above   Significant findings during the operation included discovery of a large para and retrocaval mass which was encasing the right renal artery and the hilum.  This was separately biopsied.    There were no immediate complications during this procedure but please refer to the full operative summary for details.           Hospital Course:   The patient's hospital course was unremarkable.  Dimple Oviedo recovered as anticipated and experienced no post-operative complications.      On POD #2 she was ambulating without assitance, tolerating the discharge diet, had pain controlled with PO medications to go home with, and was requiring no IV medications or fluids. She was discharged to her son's home with her Diehl catheter.  She was given appropriate contact information, follow-up and instructions as seen below in the discharge paperwork.         Discharge Labs:     No results found for: PSA    Recent Labs  Lab 11/14/18  0632 11/13/18  1346   WBC 11.3* 13.1*   HGB 10.0* 11.2*    227       Recent Labs  Lab 11/14/18  0632 11/13/18  1346 11/13/18  0726    139  --    POTASSIUM 4.7 4.0 4.4   CHLORIDE 104 105  --    CO2 27 27  --    BUN 17 20  --    CR 1.00 0.85  --    GLC 98 127*  --    SHREYA 8.2* 8.5  --      No lab results found in last 7 days.    Invalid input(s): URINEBLOOD  Results for orders placed or performed in visit on 10/22/18   Urine Culture Aerobic Bacterial   Result Value Ref Range    Specimen Description Midstream Urine     Special Requests Midstream Urine     Culture Micro No growth    Results for orders placed or performed during the hospital encounter of 09/10/18   Urine Culture Aerobic Bacterial   Result Value Ref Range    Specimen Description Catheterized Urine     Special Requests Specimen received in preservative     Culture Micro No growth        Results for orders placed or performed during the hospital encounter of 11/13/18   Glucose by meter   Result Value Ref  Range    Glucose 94 70 - 99 mg/dL   Potassium   Result Value Ref Range    Potassium 4.4 3.4 - 5.3 mmol/L   Surgical pathology exam   Result Value Ref Range    Copath Report       Patient Name: QUIN VALDEZ   MR#: [id number]   Specimen #: [id number]   Collected: 11/13/2018   Received: 11/13/2018   Reported: 11/20/2018 17:32   Ordering Phy(s): ADAM RODRIGUEZ     For improved result formatting, select 'View Enhanced Report Format' under    Linked Documents section.     ORIGINAL REPORT:     SPECIMEN(S):   A: Ana-caval mass   B: Ana-caval lymph nodes   C: Right kidney and ureter     FINAL DIAGNOSIS:   A. Ana-caval mass, excision:   - Metastatic urothelial carcinoma involving one lymph node     B. Ana-caval lymph nodes, excision:   - Five benign lymph nodes     C. Right kidney and ureter, nephroureterectomy:   - Invasive high grade urothelial carcinoma   - Tumor size: 3.5 cm   - Tumor location: Renal pelvis   - Tumor extent: Invades through renal parenchyma into perinephric fat   - Margins: Free of tumor   - Urothelial carcinoma in-situ present   - Pathologic stage: pT4N1     Report Name: Renal Pelvis, Ureter Resection        Status: Submitted Checklist Inst: 1      Last Updated By: Radha Vásquez M.D., [facility], 11/20/2018   17:29:42   Part(s) Involved:   C: Right kidney and ureter     Synoptic Report:     SPECIMEN     Procedure:         - Nephroureterectomy, complete     Specimen Laterality:         - Right     TUMOR     Tumor Site:         - Renal pelvis     Histologic Type:         - Urothelial carcinoma, invasive     Histologic Grade:         - High-grade     Tumor Size: 3.5 Centimeters (cm)       Tumor Extension:         - Tumor invades adjacent organs, or through the kidney into the         perinephric fat     Accessory Findings       Lymphovascular Invasion:           - Present       Tumor Configuration:           - Solid / nodule     MARGINS     Margins:         - Uninvolved by invasive  carcinoma and carcinoma in situ /   noninvasive         urothelial carcinoma     LYMPH NODES     Number of Lymph Nodes Involved: 1     Size of Largest Metastatic Deposit: At least 1 Centimeters (cm)     Site: Pericaval     Extranodal Extension (ROMMEL):         - Present     Number of Lymph Nodes Examined: 6     PATHOLOGIC STAGE CLASSIFICATION (PTNM, AJCC 8TH EDITION)     Primary Tumor (pT):         - pT4     Regional Lymph Nodes (pN):         - pN1     ADDITIONAL FINDINGS     Pathologic Findings in Ipsilateral Nonneoplastic Renal Tissue:         - None identified     2017 June AJCC 8th Edition CAP Annual Release     I have personally reviewed all specimens and/or slides, including the   listed special stains, and used them   with my medical judgement to determine or confirm the final diagnosis.     Electronically signed out by:     Radha Vásquez M.D., Physicians        CBC with platelets   Result Value Ref Range    WBC 13.1 (H) 4.0 - 11.0 10e9/L    RBC Count 4.08 3.8 - 5.2 10e12/L    Hemoglobin 11.2 (L) 11.7 - 15.7 g/dL    Hematocrit 35.3 35.0 - 47.0 %    MCV 87 78 - 100 fl    MCH 27.5 26.5 - 33.0 pg    MCHC 31.7 31.5 - 36.5 g/dL    RDW 15.4 (H) 10.0 - 15.0 %    Platelet Count 227 150 - 450 10e9/L   Basic metabolic panel   Result Value Ref Range    Sodium 139 133 - 144 mmol/L    Potassium 4.0 3.4 - 5.3 mmol/L    Chloride 105 94 - 109 mmol/L    Carbon Dioxide 27 20 - 32 mmol/L    Anion Gap 7 3 - 14 mmol/L    Glucose 127 (H) 70 - 99 mg/dL    Urea Nitrogen 20 7 - 30 mg/dL    Creatinine 0.85 0.52 - 1.04 mg/dL    GFR Estimate 64 >60 mL/min/1.7m2    GFR Estimate If Black 77 >60 mL/min/1.7m2    Calcium 8.5 8.5 - 10.1 mg/dL   Basic metabolic panel   Result Value Ref Range    Sodium 136 133 - 144 mmol/L    Potassium 4.7 3.4 - 5.3 mmol/L    Chloride 104 94 - 109 mmol/L    Carbon Dioxide 27 20 - 32 mmol/L    Anion Gap 6 3 - 14 mmol/L    Glucose 98 70 - 99 mg/dL    Urea Nitrogen 17 7 - 30 mg/dL    Creatinine 1.00 0.52 - 1.04  mg/dL    GFR Estimate 53 (L) >60 mL/min/1.7m2    GFR Estimate If Black 64 >60 mL/min/1.7m2    Calcium 8.2 (L) 8.5 - 10.1 mg/dL   CBC with platelets   Result Value Ref Range    WBC 11.3 (H) 4.0 - 11.0 10e9/L    RBC Count 3.71 (L) 3.8 - 5.2 10e12/L    Hemoglobin 10.0 (L) 11.7 - 15.7 g/dL    Hematocrit 32.9 (L) 35.0 - 47.0 %    MCV 89 78 - 100 fl    MCH 27.0 26.5 - 33.0 pg    MCHC 30.4 (L) 31.5 - 36.5 g/dL    RDW 15.9 (H) 10.0 - 15.0 %    Platelet Count 207 150 - 450 10e9/L            Discharge Instructions and Follow-Up:   Diet:  - Regular/ home diet    Activity:   - No strenuous exercise for 4 weeks.   - No lifting, pushing, pulling more than 15 pounds for 4 weeks.   - Do not strain with bowel movements.   - Do not drive until you can press the brake pedal quickly and fully without pain.   - Do not operate a motor vehicle while taking narcotic pain medications.     Medications:   - PAIN: Oxycodone is a narcotic medication that has been prescribed for pain.  Narcotics will cause sleepiness and constipation and can become addictive, therefore it is best to stop or reduce them as soon as you can.  Any left over narcotics should be disposed of with an Authorized  for unneeded medications.  Contact your [facility] s or [facility] s household trash and recycling service to learn about medication disposal options and guidelines for your area.  If you decide to store this medication at home it should be kept in a locked cabinet to prevent access to children or visitors. To reduce your narcotic use, take Tylenol (acetaminophen 625mg) every 4-6 hours as needed.  This has been prescribed for you.  Do not take more than 4,000mg of Tylenol (acetaminophen/ APAP) from all sources in any 24 hour period since this can cause liver damage.  Avoid ibuprofen now that you only have one kidney. Ibuprofen can be hard on the kidneys. Never drive, operate machinery or drink alcoholic beverages while you are taking narcotic pain  medications.     2) CONSTIPATION: Pericolace (senna/docusate sodium) can be taken twice daily for prevention of constipation since surgery, pain medications and bladder spasm medications can all make you constipated.  Please reduce or stop pericolace if you develop loose stools. Other over the counter solutions such as prune juice, miralax, fiber products, senna, and dulcolax can also be used. If you are taking the pericolace but still have not had a bowel movement in 3 days, start over-the-counter Milk of Magnesia taken twice daily until you have a nice bowel movement.  Call the office with any concerns.     Tubes/Drains:  - You are going home with a Diehl catheter which will remain in place until your follow-up appointment. This catheter will not generally restrict your activities, but you should be careful if you are driving such that it does not become a distraction.    - Your nurse or  will provide written catheter care instructions for you to take home.  - Protect the catheter and treat it like an extension of your body - keep it secured to your leg and do not let it get caught, snagged, or tugged. The catheter tubing should remain tension-free and without kinks. In order to drain best, the tubing and bag should always be lower than your body.  - You may notice a little blood, small amount of white discharge, or caking at the catheter insertion site. This is normal but it can irritate the skin so it is best to wash this off in the daily shower. You are encouraged to wash the catheter tubing to keep it clean, and the urine drainage bag also can be gotten wet.     Incisions:   - You may shower and get incisions wet starting 48 hrs after surgery.  - Do not scrub incisions or submerge wounds in a bath, pool, hot tub, etc. for 2 weeks.   - Your incisions were closed with dissolvable suture that will not need to be removed.  Commonly, purple dermabond glue is applied over the top of the sutures.  Avoid  using lotions or ointments on your incisions.    - Leave incisions open to air.  Cover with gauze only if needed for comfort or to protect clothing from drainage.     Follow-Up:   - Call your primary care provider to touch base regarding your recent admission.     - We will try to arrange an appointment to get your catheter removed next week  - Follow up with Dr. King's team (Quyen Fregoso - 11/27) as scheduled. We will try to arrange an Oncology appointment for the week when your daughter is home.   - Call or return sooner than your regularly scheduled visit if you develop any of the following:  Fever (greater than 101.3F), uncontrolled pain, uncontrolled nausea or vomiting, as well as increased redness, swelling, or drainage from your wound.  - Drink 2-3L of water a day to keep your urine clear to light pink in color. Some light blood in your urine can occur but should clear with increased water consumption as instructed.    - Call if blood in urine persists, becomes darker, you see clots or have difficulty urinating    Phone numbers:  - Monday through Friday 8am to 4:30pm: call [phone].    - Nights or weekends, call [phone] and ask the  to page the urology resident on call.   - For emergencies, always call [phone]         Discharge Disposition:   Discharged to home      Attestation: I have reviewed today's vital signs, notes, medications, labs and imaging.    Quyen Fregoso PA-C  Urology Physician Assistant  Personal Pager: [phone]    Please call Job Code:   [phone] to reach the Urology resident or PA on call - Weekdays  [phone] to reach the Urology resident or PA on call - Weeknights and weekends    November 15, 2018

## 2018-11-15 NOTE — PROGRESS NOTES
"Urology Daily Progress Note    24 hour events/Subjective:     - No acute events overnight   - Pain well controlled on current regimen   - Tolerating regular ; no nausea or vomiting, passing flatus    - up to edge of bed but has not ambulated yet    O:  Vitals: /41 (BP Location: Left arm)  Pulse 73  Temp 99  F (37.2  C) (Oral)  Resp 18  Ht 1.448 m (4' 9\")  Wt 63.7 kg (140 lb 6.9 oz)  SpO2 90%  BMI 30.39 kg/m2  General: Alert, interactive, in NAD  Resp: Non-labored breathing on RA  Abdomen: Soft, appropriately tender, incisions C/D/I   Ext: Warm and well perfused   Diehl/Urostomy: Clear yellow     I/O  UOP  930/450    Labs  Pending this am    Heme:    Recent Labs  Lab 11/14/18  0632 11/13/18  1346   WBC 11.3* 13.1*   HGB 10.0* 11.2*    227     Chem:    Recent Labs  Lab 11/14/18  0632 11/13/18  1346 11/13/18  0726   POTASSIUM 4.7 4.0 4.4   CR 1.00 0.85  --        Assessment/Plan  85 year old y/o female POD#2  s/p right robot assisted nephrouretectomy, with intra op lymph node bx revealing metastatic UCC.   Doing well     NEURO Pain well controlled on  Current regimen    CV HDS.    PULM Aggressive pulmonary toilet and I/S.   FEN/GI IVF: 100 ml of NS /hr  Diet: ADAT to regular   Provide bowel regimen    Diehl to remain for 1 week   HEME Hgb as above.    ID Afebrile, no leukocytosis.    Antibiotics: Completed periop antibiotics    ENDO No issues   ACTIVITY Up as tolerated  PT/OT consulted  Ambulate at least TID    PPx SCDs, ambulation   DISPO Pending          Seen and examined with chief resident, to be discussed with Dr. King    --    Haris Garza MD  Urology PGY4    For questions re this patient:  see \"contacting urology team\" instructions below, or refer to the call sheet           Contacting the Urology Team     Please use the following job codes to reach the Urology Team. Note that you must use an in house phone and that job codes cannot receive text pages.     On weekdays, dial [phone] (or " star-star-star [phone] on the new [employer] telephones) then [phone] to reach the Adult Urology resident or PA on call    On weekdays, dial [phone] (or star-star-star [phone] on the new [employer] telephones) then [phone] to reach the Pediatric Urology resident    On weeknights and weekends, dial [phone] (or star-star-star [phone] on the new [place] telephones) then [phone] to reach the Urology resident on call (for both Adult and Pediatrics)

## 2018-11-15 NOTE — PLAN OF CARE
Problem: Patient Care Overview  Goal: Plan of Care/Patient Progress Review  Outcome: Adequate for Discharge Date Met: 11/15/18  Pt is POD#2 from a right robot assisted nephrouretectomy. Progressing adequate for discharge. Vitals stable. Pain well controlled with oxycodone 5mg. Tolerating regular diet, w/o nausea. Ambulating in halls with SBA and walker. Goldman catheter with adequate UOP, switched to leg bag prior to discharge. Midline incision with slight erythema, outlined. Lap sites c/d/i, dermabonded. AVS and discharge instructions discussed with pt and her daughter in law. Incisions care and goldman catheter cares demonstrated, with verbalization of understanding. Discussed with pt s/s of infection, and when to call/seek emergency care. PIV x1 removed. Plan for f/u on Monday Nov.19 for goldman removal. Sent with transport to pharmacy and front door.

## 2018-11-15 NOTE — PLAN OF CARE
Problem: Patient Care Overview  Goal: Plan of Care/Patient Progress Review  Outcome: No Change  Vital signs:  Temp: 99  F (37.2  C) Temp src: Oral BP: 126/41 Pulse: 73 Heart Rate: 82 Resp: 18 SpO2: 90 % O2 Device: None (Room air)   A&Ox4. VSS on RA. Pain well managed with current regimen. Regular diet. No c/o N/V overnight. NS infusing at 50 ml/hr. Abdominal lap sites and mid incision c/d/i, dermabond, +erythema marked, no change. Abdominal binder on. Diehl in place with adequate urine output. Pt slept well overnight. Continue POC.

## 2018-11-16 ENCOUNTER — PATIENT OUTREACH (OUTPATIENT)
Dept: CARE COORDINATION | Facility: CLINIC | Age: 83
End: 2018-11-16

## 2018-11-16 ENCOUNTER — TELEPHONE (OUTPATIENT)
Dept: FAMILY MEDICINE | Facility: CLINIC | Age: 83
End: 2018-11-16

## 2018-11-16 DIAGNOSIS — C66.1 UROTHELIAL CARCINOMA OF RIGHT DISTAL URETER (H): Primary | ICD-10-CM

## 2018-11-16 NOTE — LETTER
[facility]  Complex Care Plan  About Me  Patient Name:  Dimple Oviedo    YOB: 1933  Age:     85 year old   Mario MRN:   [id number] Telephone Information:    Home Phone [phone]   Mobile [phone]       Address:    [address]  [place] [address] Email address:  [email or web address]      Emergency Contact(s)  Name Relationship Lgl Grd Work Phone Home Phone Mobile Phone   1. OSMEL OVIEDO (* Relative No  [phone]   2. ELIZABETH OVIEDO Son No  [phone]   3. FARHAD KAMARA* Daughter No  [phone]           Primary language:  English     needed? No   [facility] [employer]:  [phone] op. 1  Other communication barriers: None  Preferred Method of Communication:  Mail  Current living arrangement: I live in a private home with family  Mobility Status/ Medical Equipment: Independent w/Device    Health Maintenance  Health Maintenance Reviewed: Not assessed    My Access Plan  Medical Emergency 911   Primary Clinic Line [facility] [phone]   24 Hour Appointment Line [phone] or  [phone]) (toll-free)   24 Hour Nurse Line [phone] (toll-free)   Preferred Urgent Care [facility], [phone]   Preferred Hospital [facility]  [phone]   Preferred Pharmacy [facility] - [facility] [address] AT [facility] [address]     Behavioral Health Crisis Line The National Suicide Prevention Lifeline at 1-716.670.5804 or 911     My Care Team Members    Patient Care Team       Relationship Specialty Notifications Start End    Pop Zapien MD PCP - General Family Practice  1/30/15     Phone: [phone] Fax: [phone] [address] [facility] [address]    Crista Richards, APRN CNP PCP - Cardiology Nurse Practitioner  Admissions 2/20/18     Phone: [phone] Fax: [phone]         [address] [facility] [address]    Vincenzo Tovar MD MD Family Medicine - Sports Medicine  5/11/15     Phone: [phone] Fax: [phone]         [address] [facility] [address]  [facility] [address]    Candelaria Raymundo MD MD Gastroenterology  8/21/15     Phone: [phone] Fax: [phone]         [address] [place] [address] [address] [facility] [address]    Shavon Bustamante PA-C Physician Assistant Physician Assistant  1/11/18     Phone: [phone] Fax: [phone]         [address] [place] [address]    Kenna La MD MD Urology  2/1/18     Phone: [phone] Fax: [phone]         [address] [place] [address] [address] [facility] [address]    Cristiana Correa RN Registered Nurse Urology  2/1/18     Phone: [phone] Pager: [phone]        Pop Zapien MD Referring Physician Family Practice  2/1/18     Phone: [phone] Fax: [phone]         [address] [address]    Kiera Figueroa, RN Registered Nurse Urology Abnormal results only, Admissions 9/4/18     Phone: [phone] Pager: [phone]        Weight, Stuart Blackmon MD MD Urology  10/9/18     Phone: [phone] Fax: [phone]         [address] [place] [address]    Kiera Figueroa, RN Registered Nurse Urology  10/9/18     Phone: [phone] Pager: [phone]        Geovanna Fregoso PA Physician Assistant Urology  10/9/18     Phone: [phone] Fax: [phone]         [address] [place] [address]    Omayra Gatica, RN Clinic Care Coordinator Primary Care - CC  11/16/18     Phone: [phone] Fax: [phone]                My Care Plans  Self Management and Treatment Plan  Goals and (Comments)  Goals        General    Medical (pt-stated)     Notes - Note created  11/16/2018 11:15 AM by Omayra Gatica, RN    Goal Statement: I will watch for signs of a urinary tract infection    Measure of Success: free flowing urine output   Supportive Steps to Achieve:   I will seek medical help/call urologist if experiencing signs of burning frequency pain blood in urine or fever  I will follow discharge restrictions   I will keep future Urology appointments  I will take pain medication as needed     Barriers: None   Strengths: Lives with son   Date to Achieve By: to be determined   Patient expressed understanding of goal: Yes               Action Plans on File:            Depression          Advance Care Plans/Directives Type:   Type Advanced Care Plans/Directives: Advanced Directive - On File    My Medical and Care Information  Problem List   Patient Active Problem List   Diagnosis     Esophageal reflux     Restless leg syndrome     heat intolerance     Goiter     Disorder of bone and cartilage     Other psoriasis     perirectal cyst     Malignant melanoma of skin of trunk, except scrotum (H)     Nontoxic multinodular goiter     Hyperlipidemia LDL goal <130     Hypertension goal BP (blood pressure) < 140/90     Urinary incontinence     Osteoarthritis of left knee     Hip arthritis     Polymyalgia rheumatica (H)     High risk medication use     Shoulder pain     Impaired fasting blood sugar     Chronic bilateral low back pain without sciatica     Obesity, unspecified obesity severity, unspecified obesity type     Iron deficiency anemia, unspecified iron deficiency anemia type     NSTEMI (non-ST elevated myocardial infarction) (H)     Stress-induced cardiomyopathy     A-fib (H)     Anxiety     Chronic systolic heart failure (H)     Urothelial carcinoma (H)     Hyperthyroidism     Restless legs syndrome     CRESENCIO (acute kidney injury) (H)     Hydronephrosis     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    Care Coordination Start Date: 11/16/2018   Frequency of Care Coordination: weekly   Form Last Updated: 11/16/2018

## 2018-11-16 NOTE — PROGRESS NOTES
Patient has clinic visit within 24-48 hours of Discharge so no post DC follow up call is needed    11/19/2018 Status: Phill    Time: 1:00 PM Length: 40     Visit Type: UMP RETURN [[id number]]   SHANAE:     Provider: Nurse, [facility] Department:  URO AND PROSTATE   Special Needs: None   Comments: 7/6/18

## 2018-11-16 NOTE — LETTER
[place] CARE COORDINATION    November 16, 2018    Dimple Oviedo  [address]   [place] [address]      Dear Dimple,    I am a clinic care coordinator who works with Pop Zapien MD, MD at [facility] . I wanted to thank you for spending the time to talk with me.  I wanted to introduce myself and provide you with my contact information so that you can call me with questions or concerns about your health care. Below is a description of clinic care coordination and how I can further assist you.     The clinic care coordinator is a registered nurse  who understand the health care system. The goal of clinic care coordination is to help you manage your health and improve access to the [facility] system in the most efficient manner. The registered nurse can assist you in meeting your health care goals by providing education, coordinating services, and strengthening the communication among your providers.     Please feel free to contact me at [phone], with any questions or concerns. We at [facility] are focused on providing you with the highest-quality healthcare experience possible and that all starts with you.     Sincerely,     Omayra Gatica RN / Clinical Care Coordinator     [facility]   [email or web address]  Office :  [phone] / Fax :  [phone]      Enclosed: I have enclosed a copy of the Complex Care Plan. This has helpful information and goals that we have talked about. Please keep this in an easy to access place to use as needed.

## 2018-11-16 NOTE — PROGRESS NOTES
Clinic Care Coordination Contact    Clinic Care Coordination Contact  OUTREACH    Referral Information:  Referral Source: IP Report    Primary Diagnosis: Genitourinary Disorders    Chief Complaint   Patient presents with     Clinic Care Coordination - Post Hospital     Clinic Care Coordination RN         [place] Utilization: [facility]   11/13-11/15/2018--       Admission Diagnoses:   Metastatic Urothelial Cancer       Past Medical History         Past Medical History:   Diagnosis Date     Calculus of kidney       Esophageal reflux       GERD (gastroesophageal reflux disease)       Hyperlipidemia LDL goal <130 5/9/2010     Malignant melanoma of skin of trunk, except scrotum (H)       Nonspecific abnormal finding       has living will 2004 -      Nontoxic multinodular goiter       no further eval /tx rec per pt     Osteopenia       Other psoriasis       Personal history of colonic polyps       PMR (polymyalgia rheumatica) (H)       Stress-induced cardiomyopathy       Undiagnosed cardiac murmurs       Unspecified constipation       Unspecified essential hypertension       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                      Discharge Diagnosis:    Metastatic Urothelial Cancer       Past Medical History         Past Medical History:   Diagnosis Date     Calculus of kidney       Esophageal reflux       GERD (gastroesophageal reflux disease)       Hyperlipidemia LDL goal <130 5/9/2010     Malignant melanoma of skin of trunk, except scrotum (H)       Nonspecific abnormal finding       has living will 2004 -      Nontoxic multinodular goiter       no further eval /tx rec per pt     Osteopenia       Other psoriasis       Personal history of colonic polyps       PMR (polymyalgia rheumatica) (H)       Stress-induced cardiomyopathy       Undiagnosed cardiac murmurs       Unspecified constipation       Unspecified essential hypertension       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                    Procedures:    Procedure(s): 11/13/18 -   1. Cystoscopy and Stent Removal  2. Right robot assisted nephroureterectomy with Bladder Cuff  3. Rt Para caval and Precaval Lymphadenectomy   4. Biopsy of the Rt Hilar/ Retrocaval Mass  Dr. Stuart King (Urology)          Clinic Utilization  Difficulty keeping appointments:: No  Compliance Concerns: No  No-Show Concerns: No  No PCP office visit in Past Year: No  Utilization    Last refreshed: 11/16/2018  9:40 AM:  No Show Count (past year) 0       Last refreshed: 11/16/2018  9:40 AM:  ED visits 2       Last refreshed: 11/16/2018  9:40 AM:  Hospital admissions 5          Current as of: 11/16/2018  9:40 AM             Clinical Concerns:  Current Medical Concerns:  See triage documentation today .    Patient took one Oxycodone this morning with relief.  Patient will take Senokot and Metamucil to prevent constipation   Patients goldman is draining freely yellow urine on episode of a few blood tinged urine but no further evidence of blood in urine .        Patient has a Urology appointment 11/19 and has an emergency contact if any questions or concerns   Patient states she is staying with her son indefinitely   Pain  Pain (GOAL):: No  Health Maintenance Reviewed: Not assessed  Clinical Pathway: None    Medication Management:  Reviewed by triage RN earlier     Functional Status:  Dependent ADLs:: Ambulation-walker  Bed or wheelchair confined:: No  Mobility Status: Independent w/Device    Living Situation:  Current living arrangement:: I live in a private home with family    Diet/Exercise/Sleep:  Food Insecurity: No  Tube Feeding: No  Exercise:: Currently not exercising  Inadequate activity/exercise (GOAL):: No  Significant changes in sleep pattern (GOAL): No         Psychosocial:  Informal Support system:: Family     Financial/Insurance:   Financial/Insurance concerns (GOAL):: No     Supplies used at home::  (Goldman catheter )  Equipment Currently Used at Home: walker,  rolling    Advance Care Plan/Directive  Advanced Care Plans/Directives on file:: Yes  Type Advanced Care Plans/Directives: Advanced Directive - On File    Referrals Placed: None     Goals:   Goals        General    Medical (pt-stated)     Notes - Note created  11/16/2018 11:15 AM by Omayra Gatica RN    Goal Statement: I will watch for signs of a urinary tract infection   Measure of Success: free flowing urine output   Supportive Steps to Achieve:   I will seek medical help/call urologist if experiencing signs of burning frequency pain blood in urine or fever  I will follow discharge restrictions   I will keep future Urology appointments  I will take pain medication as needed     Barriers: None   Strengths: Lives with son   Date to Achieve By: to be determined   Patient expressed understanding of goal: Yes                Patient/Caregiver understanding: Patient expresses good understanding of discharge instructions     Outreach Frequency: weekly  Future Appointments              In 3 days Nurse,  Prostate Cancer [facility] Prostate [facility] Urologic Cancers, [facility]    In 1 week [address] [facility] , [facility]    In 1 week  LAB [facility], [facility]    In 1 week Geovanna Fregoso PA [facility]rologic C[facility], [facility]    In 2 weeks Dhara Meza MD [facility] Endocrinology, [facility]    In 2 weeks Weight, Stuart Blackmon MD [facility] for Prostate and Urologic Cancers, [facility]          Plan: Patient agrees to 1 more follow up call     Omayra Gatica RN / Clinical Care Coordinator     [facility]   [email or web address]  Office :  [phone] / Fax :  [phone]

## 2018-11-16 NOTE — TELEPHONE ENCOUNTER
"ED/Discharge Protocol    \"Hi, my name is Kayleen Castillo, a registered nurse, and I am calling on behalf of Dr. Zapien's office at [place].  I am calling to follow up and see how things are going for you after your recent visit.\"    \"I see that you were in the (ER/UC/IP) on 11/13-11/15 [facility]      On 11/13/18 Ms. Oviedo underwent   1. Cystoscopy and Stent Removal  2. Right robot assisted nephroureterectomy with Bladder Cuff  3. Rt Para caval and Precaval Lymphadenectomy   4. Biopsy of the Rt Hilar/ Retrocaval Mass  Surgeon: Dr. Davidson Weight            How are you doing now that you are home?\" Feeling much better than yesterday   Patient is currently at her son's home and daughter in law helping to care for her     Is patient experiencing symptoms that may require a hospital visit?  No     Discharge Instructions    \"Let's review your discharge instructions.  What is/are the follow-up recommendations?  Pt. Response: I have an appointment to have this catheter taken out on 11/19/18 - patient states the cath bag was pretty full this am when she woke. No pain or discomfort. Patient notes that she had a small amount of blood in urine this - little specks, she will continue to monitor this and if increases will call surgeon to report   No lifting over 15 lbs x 4 weeks, no driving with oxycodone  Pain level is manageable while sitting, if moving around increases, patient just took oxycodone a few minutes ago - patient did not get stool softener Senna as directed - discussed and encouraged to obtain this to prevent constipation     Patient has NO questions at this time about her hospital stay     \"Were you instructed to make a follow-up appointment?\"  Pt. Response: Yes.  Has appointment been made?   Yes  - patient has an appointment with urology, she does not wish to make f/u with pcp right now as she has several specialty appointments scheduled - she will return call to schedule if she changes her mind     \"When you " "see the provider, I would recommend that you bring your discharge instructions with you.    Medications    \"How many new medications are you on since your hospitalization/ED visit?\"    0-1 - oxycodone - was advised senna however did not pick this up - this was discussed today and patient was encouraged to obtain   \"How many of your current medicines changed (dose, timing, name, etc.) while you were in the hospital/ED visit?\"   0-1  \"Do you have questions about your medications?\"   No  \"Were you newly diagnosed with heart failure, COPD, diabetes or did you have a heart attack?\"   No  For patients on insulin: \"Did you start on insulin in the hospital or did you have your insulin dose changed?\"   No    Medication reconciliation completed? Attempted, but patient unable to complete on phone - Epic MTM Referral needed     Was MTM referral placed (*Make sure to put transitions as reason for referral)?   No    Call Summary    \"Do you have any questions or concerns about your condition or care plan at the moment?\"    No  Triage nurse advice given:   Obtain senna stool softener to use with oxycodone to prevent constipation   Continue to monitor for fever, increase in pain/blood in urine bag and if this occurs call surgeon     Patient was in ER  3 x in the past year (assess appropriateness of ER visits.)      \"If you have questions or things don't continue to improve, we encourage you contact us through the main clinic number,  [phone].  Even if the clinic is not open, triage nurses are available 24/7 to help you.     We would like you to know that our clinic has extended hours (provide information).  We also have urgent care (provide details on closest location and hours/contact info)\"      \"Thank you for your time and take care!\"    Kayleen Castillo Registered Nurse   [facility]         "

## 2018-11-18 ENCOUNTER — TELEPHONE (OUTPATIENT)
Dept: ONCOLOGY | Facility: CLINIC | Age: 83
End: 2018-11-18

## 2018-11-18 NOTE — TELEPHONE ENCOUNTER
----- Message from Laura Rae sent at 11/15/2018 10:00 AM CST -----  Regarding: FW: needs oncology  Can you please schedule and let me know, currently she is Inpatient. I tried calling but couldn't stay on hold that long. Thanks :-)  ----- Message -----     From: Kiera Figueroa RN     Sent: 11/15/2018   8:16 AM       To: Clinic Coordinators-Uro  Subject: FW: needs oncology                               Please see below and call and schedule accordingly. Thank you  ----- Message -----     From: Geovanna Fregoso PA     Sent: 11/15/2018   7:23 AM       To: Stuart King MD, #  Subject: needs oncology                                   Hi Kiera:  Can you get her an oncology appointment on the 26th, 27th or 28th?  Her [place] and she wants to help her mom with medical decision making.     Dimple has metastatic renal cancer and is s/p nephroureterectomy 11/13/18.  The path is pending but really should be back by the 26th.     Thanks!  [place]

## 2018-11-19 ENCOUNTER — CARE COORDINATION (OUTPATIENT)
Dept: UROLOGY | Facility: CLINIC | Age: 83
End: 2018-11-19

## 2018-11-19 ENCOUNTER — ALLIED HEALTH/NURSE VISIT (OUTPATIENT)
Dept: UROLOGY | Facility: CLINIC | Age: 83
End: 2018-11-19
Payer: MEDICARE

## 2018-11-19 DIAGNOSIS — R32 URINARY INCONTINENCE, UNSPECIFIED TYPE: Primary | ICD-10-CM

## 2018-11-19 NOTE — PATIENT INSTRUCTIONS
Patient will follow up with Geovanna Fregoso on 11/27/2018 @ 11:30 am for her post op appointment.        It was a pleasure meeting with you today.  Thank you for allowing me and my team the privilege of caring for you today.  YOU are the reason we are here, and I truly hope we provided you with the excellent service you deserve.  Please let us know if there is anything else we can do for you so that we can be sure you are leaving completely satisfied with your care experience.      Alona Gerardo MA

## 2018-11-19 NOTE — PROCEDURES
Chief Complaint   Patient presents with     Allied Health Visit     TOV     Dimple Oviedo comes into clinic today at the request of Dr. Stuart King for TOV / catheter removal.    This service provided today was under the direct supervision of Dr. Nikunj Cobos, who was available if needed.    Dimple Oviedo presented today for a trial of void.  Approximately 200 mL of normal saline instilled into bladder via catheter.  Patient stated she had urge to urinate and catheter was removed without difficulty.  Patient was given a urinal hat to measure urine output.  Patient voided approximately 200 mL of clear urine.  PVR 0 mL.    Cipro 500 given per protocol: Yes  The following medication was given:     MEDICATION:  Cipro  ROUTE: oral  SITE: mouth  DOSE: 500 mg  LOT #: [id number]  : [employer]  EXPIRATION DATE: 05/20  NDC#: [id number]   Was there drug waste? No  Multi-dose vial: No    Alona Gerardo CMA  November 19, 2018  Patient did tolerate procedure well.    Teaching done with patient verbally as where to call or go if pain, fever, or unable to urinate post catheter removal.    Alona Gerardo MA  11/19/2018  1:33 PM

## 2018-11-19 NOTE — MR AVS SNAPSHOT
After Visit Summary   11/19/2018    Dimple Oviedo    MRN: [id number]           Patient Information     Date Of Birth          6/23/1933        Visit Information        Provider Department      11/19/2018 1:00 PM Nurse,  [facility]        Care Instructions    Patient will follow up with Geovanna Fregoso on 11/27/2018 @ 11:30 am for her post op appointment.        It was a pleasure meeting with you today.  Thank you for allowing me and my team the privilege of caring for you today.  YOU are the reason we are here, and I truly hope we provided you with the excellent service you deserve.  Please let us know if there is anything else we can do for you so that we can be sure you are leaving completely satisfied with your care experience.      Aolna Gerardo MA            Follow-ups after your visit        Your next 10 appointments already scheduled     Nov 26, 2018 11:15 AM CST   (Arrive by 11:00 AM)   New Patient Visit with Alba Buck MD   [facility] ([facility])    [address] [place] [address]  [address]  [facility] [address] [phone]            Nov 26, 2018  1:30 PM CST   US THYROID with UCUS2   [facility] ([facility])    [address] [place] [address]  1st Floor  [facility] [id number] [phone]           How do I prepare for my exam? (Food and drink instructions) No Food and Drink Restrictions.  How do I prepare for my exam? (Other instructions) You do not need to do anything special to prepare for your exam.  What should I wear: Wear comfortable clothes.  How long does the exam take: Most ultrasounds take 30 to 60 minutes.  What should I bring: Bring a list of your medicines, including vitamins, minerals and over-the-counter drugs. It is safest to leave personal items at home.  Do I need a :  No  is needed.  What  do I need to tell my doctor: Tell your doctor about any allergies you may have.  What should I do after the exam: No restrictions, You may resume normal activities.  What is this test: An ultrasound uses sound waves to make pictures of the body. Sound waves do not cause pain. The only discomfort may be the pressure of the wand against your skin or full bladder.  Who should I call with questions: If you have any questions, please call the Imaging Department where you will have your exam. Directions, parking instructions, and other information is available on our website, [email or web address].            Nov 26, 2018  2:15 PM CST   LAB with  LAB   [facility] ([facility])    [address] [place] [id number] [phone]           Please do not eat 10-12 hours before your appointment if you are coming in fasting for labs on lipids, cholesterol, or glucose (sugar). This does not apply to pregnant women. Water, hot tea and black coffee (with nothing added) are okay. Do not drink other fluids, diet soda or chew gum.            Nov 27, 2018 11:30 AM CST   (Arrive by 11:15 AM)   Post-Op with ALYSSA Mejia   [facility] and [facility] ([facility])    [address] [place] [id number] [phone]            Dec 04, 2018  1:30 PM CST   (Arrive by 1:15 PM)   RETURN ENDOCRINE with Dhara Meza MD   [facility]inology ([facility])    [address] [place] [id number] [phone]            Dec 06, 2018 11:00 AM CST   (Arrive by 10:45 AM)   Post-Op with Stuart King MD   [facility] and [facility] ([facility])    [address] [place] [address] [id number] [phone]              Who to contact     Please call your clinic at  [phone] to:    Ask questions about your health    Make or cancel appointments    Discuss your medicines    Learn about your test results    Speak to your doctor            Additional Information About Your Visit        [employer] Information     [employer] gives you secure access to your electronic health record. If you see a primary care provider, you can also send messages to your care team and make appointments. If you have questions, please call your primary care clinic.  If you do not have a primary care provider, please call [phone] and they will assist you.      [employer] is an electronic gateway that provides easy, online access to your medical records. With [employer], you can request a clinic appointment, read your test results, renew a prescription or communicate with your care team.     To access your existing account, please contact your [facility] or call [phone] for assistance.        Care EveryWhere ID     This is your Care EveryWhere ID. This could be used by other organizations to access your [facility] medical records  [id number]         Blood Pressure from Last 3 Encounters:   11/15/18 146/56   11/01/18 151/52   10/22/18 120/70    Weight from Last 3 Encounters:   11/13/18 63.7 kg (140 lb 6.9 oz)   11/01/18 63.8 kg (140 lb 11.2 oz)   10/22/18 64.9 kg (143 lb)              Today, you had the following     No orders found for display         Today's Medication Changes          These changes are accurate as of 11/19/18  1:24 PM.  If you have any questions, ask your nurse or doctor.               These medicines have changed or have updated prescriptions.        Dose/Directions    alendronate 70 MG tablet   Commonly known as:  FOSAMAX   This may have changed:  See the new instructions.   Used for:  High risk medication use, Osteopenia        TAKE 1 TABLET EVERY 7 DAYS AT LEAST 60 MINUTES BEFORE BREAKFAST AS DIRECTED. SEE PACKAGE FOR ADDITIONAL INSTRUCTIONS    Quantity:  12 tablet   Refills:  0       irbesartan 300 MG tablet   Commonly known as:  AVAPRO   This may have changed:    - how much to take  - how to take this  - when to take this  - additional instructions   Used for:  Essential hypertension with goal blood pressure less than 140/90        TAKE 1 TABLET EVERY DAY   Quantity:  90 tablet   Refills:  2       omeprazole 20 MG CR capsule   Commonly known as:  priLOSEC   This may have changed:  when to take this   Used for:  Gastroesophageal reflux disease without esophagitis        Dose:  20 mg   Take 1 capsule (20 mg) by mouth daily   Quantity:  180 capsule   Refills:  3       propranolol 60 MG 24 hr capsule   Commonly known as:  INDERAL LA   This may have changed:  when to take this   Used for:  Nontoxic multinodular goiter        Dose:  60 mg   Take 1 capsule (60 mg) by mouth daily   Quantity:  90 capsule   Refills:  1                Primary Care Provider Office Phone # Fax #    Pop Antonino Zapien [phone]       [facility] [address] [place] [id number]        Goals        General    Medical (pt-stated)     Notes - Note created  11/16/2018 11:15 AM by Omayra Gatica, RN    Goal Statement: I will watch for signs of a urinary tract infection   Measure of Success: free flowing urine output   Supportive Steps to Achieve:   I will seek medical help/call urologist if experiencing signs of burning frequency pain blood in urine or fever  I will follow discharge restrictions   I will keep future Urology appointments  I will take pain medication as needed     Barriers: None   Strengths: Lives with son   Date to Achieve By: to be determined   Patient expressed understanding of goal: Yes          Equal Access to Services     [facility] : Aleja Pollack, nadia barnard, maldonado lui [facility] [phone].    ATENCIÓN: Si habla español, tiene a sierra disposición servicios gratuitos  de asistencia lingüística. Al leon [phone].    We comply with applicable federal civil rights laws and [place] laws. We do not discriminate on the basis of race, color, national origin, age, disability, sex, sexual orientation, or gender identity.            Thank you!     Thank you for choosing [facility] AND [facility] FOR PROSTATE AND UROLOGIC CANCERS  for your care. Our goal is always to provide you with excellent care. Hearing back from our patients is one way we can continue to improve our services. Please take a few minutes to complete the written survey that you may receive in the mail after your visit with us. Thank you!             Your Updated Medication List - Protect others around you: Learn how to safely use, store and throw away your medicines at www.disposemymeds.org.          This list is accurate as of 11/19/18  1:24 PM.  Always use your most recent med list.                   Brand Name Dispense Instructions for use Diagnosis    acetaminophen 650 MG CR tablet    TYLENOL    100 tablet    Take 1 tablet (650 mg) by mouth every 8 hours as needed for pain    Acute post-operative pain       alendronate 70 MG tablet    FOSAMAX    12 tablet    TAKE 1 TABLET EVERY 7 DAYS AT LEAST 60 MINUTES BEFORE BREAKFAST AS DIRECTED. SEE PACKAGE FOR ADDITIONAL INSTRUCTIONS    High risk medication use, Osteopenia       aspirin 81 MG tablet      Take 81 mg by mouth every evening        CALCIUM + D PO      Take 1 tablet by mouth 2 times daily        colestipol 1 g tablet    COLESTID     Take 1 g by mouth 2 times daily TAKE OTHER MEDS 1 HOUR BEFORE OR 4 HOURS AFTER COLESID        cycloSPORINE 0.05 % ophthalmic emulsion    RESTASIS     Place 1 drop into both eyes 2 times daily        ferrous sulfate 325 (65 Fe) MG Tbec EC tablet      Take 325 mg by mouth daily        Fish Oil 500 MG Caps      Take 1 capsule by mouth 2 times daily        furosemide 20 MG tablet    LASIX    90 tablet    Take 1 tablet (20 mg) by mouth  every morning    Stasis edema of both lower extremities, (HFpEF) heart failure with preserved ejection fraction (H)       HYDROcodone-acetaminophen 5-325 MG per tablet    NORCO    10 tablet    Take 1 tablet by mouth every 6 hours as needed for severe pain        irbesartan 300 MG tablet    AVAPRO    90 tablet    TAKE 1 TABLET EVERY DAY    Essential hypertension with goal blood pressure less than 140/90       lovastatin 40 MG tablet    MEVACOR    90 tablet    TAKE 1 TABLET AT BEDTIME (HYPERLIPIDEMIA LDL GOAL BELOW 130)    Hyperlipidemia LDL goal <130       Melatonin 10 MG Tabs tablet      Take 10 mg by mouth as needed for sleep        methimazole 5 MG tablet    TAPAZOLE    180 tablet    Take 1 tablet (5 mg) by mouth 2 times daily    Nontoxic multinodular goiter       nitroGLYcerin 0.4 MG sublingual tablet    NITROSTAT    25 tablet    For chest pain place 1 tablet under the tongue every 5 minutes for 3 doses. If symptoms persist 5 minutes after 1st dose call 911.    Elevated troponin       omeprazole 20 MG CR capsule    priLOSEC    180 capsule    Take 1 capsule (20 mg) by mouth daily    Gastroesophageal reflux disease without esophagitis       order for DME     1 each    Equipment being ordered: Knee high compression for B LE 20-30mmHg, Velcro units for night time (consider hybrid sock as foot swelling is less than leg swelling), Donning device    Lymphedema of both lower extremities       oxyCODONE IR 5 MG tablet    ROXICODONE    15 tablet    Take 1 tablet (5 mg) by mouth every 4 hours as needed for moderate to severe pain    Acute post-operative pain       PROBIOTIC ADVANCED PO      Take 1 capsule by mouth every morning        propranolol 60 MG 24 hr capsule    INDERAL LA    90 capsule    Take 1 capsule (60 mg) by mouth daily    Nontoxic multinodular goiter       rOPINIRole 0.25 MG tablet    REQUIP    180 tablet    Take 1 tablet (0.25 mg) by mouth 2 times daily as needed (restless leg syndrome)    Restless legs  syndrome       senna-docusate 8.6-50 MG per tablet    SENOKOT-S;PERICOLACE    60 tablet    Take 1 tablet by mouth 2 times daily To prevent constipation    Acute post-operative pain       sertraline 100 MG tablet    ZOLOFT    90 tablet    Take 1 tablet (100 mg) by mouth every morning    THERON (generalized anxiety disorder)       traZODone 50 MG tablet    DESYREL    45 tablet    TAKE 1/2 TABLET(25 MG)  AT BEDTIME    Insomnia, unspecified type

## 2018-11-20 ENCOUNTER — TELEPHONE (OUTPATIENT)
Dept: ONCOLOGY | Facility: CLINIC | Age: 83
End: 2018-11-20

## 2018-11-20 LAB — COPATH REPORT: NORMAL

## 2018-11-20 NOTE — TELEPHONE ENCOUNTER
----- Message from Alba Buck MD sent at 11/20/2018 10:36 AM CST -----  Regarding:  cancer patient  This new patient should be scheduled with one of the  oncologists, such as Annamarie Samayoa, [facility]

## 2018-11-21 NOTE — TELEPHONE ENCOUNTER
RECORDS STATUS - ALL OTHER DIAGNOSIS      RECORDS RECEIVED FROM: [facility]   DATE RECEIVED: 11/21/18   NOTES STATUS DETAILS   OFFICE NOTE from referring provider Complete Epic   OFFICE NOTE from medical oncologist     DISCHARGE SUMMARY from hospital     DISCHARGE REPORT from the ER     OPERATIVE REPORT     MEDICATION LIST     CLINICAL TRIAL TREATMENTS TO DATE     LABS     PATHOLOGY REPORTS Complete Epic/[facility] at [facility]   ANYTHING RELATED TO DIAGNOSIS     GENONOMIC TESTING     TYPE:     IMAGING (NEED IMAGES & REPORT)     CT SCANS     MRI     MAMMO     ULTRASOUND     PET

## 2018-11-23 ASSESSMENT — ENCOUNTER SYMPTOMS
LIGHT-HEADEDNESS: 0
BLOATING: 0
MUSCLE WEAKNESS: 0
DYSURIA: 0
STIFFNESS: 0
RECTAL PAIN: 0
JAUNDICE: 0
MUSCLE CRAMPS: 0
ORTHOPNEA: 0
POLYDIPSIA: 0
PANIC: 0
FEVER: 0
CONSTIPATION: 0
MYALGIAS: 0
HYPERTENSION: 1
SYNCOPE: 0
EXERCISE INTOLERANCE: 1
WEIGHT LOSS: 0
SMELL DISTURBANCE: 0
TROUBLE SWALLOWING: 0
SINUS CONGESTION: 0
POLYPHAGIA: 1
EYE PAIN: 0
SORE THROAT: 0
SWOLLEN GLANDS: 0
DECREASED APPETITE: 0
HYPOTENSION: 0
BRUISES/BLEEDS EASILY: 0
DEPRESSION: 0
BLOOD IN STOOL: 0
VOMITING: 0
ALTERED TEMPERATURE REGULATION: 1
DIARRHEA: 0
JOINT SWELLING: 0
WEIGHT GAIN: 0
BOWEL INCONTINENCE: 0
INSOMNIA: 1
EYE IRRITATION: 0
HEMATURIA: 1
CHILLS: 0
DIFFICULTY URINATING: 1
NECK PAIN: 0
NIGHT SWEATS: 0
FATIGUE: 1
DOUBLE VISION: 0
HOARSE VOICE: 1
HEARTBURN: 0
EYE REDNESS: 0
NAUSEA: 0
PALPITATIONS: 0
EYE WATERING: 0
SLEEP DISTURBANCES DUE TO BREATHING: 0
BACK PAIN: 0
HALLUCINATIONS: 0
ABDOMINAL PAIN: 1
SINUS PAIN: 0
DECREASED CONCENTRATION: 0
FLANK PAIN: 0
NECK MASS: 0
LEG PAIN: 0
NERVOUS/ANXIOUS: 1
ARTHRALGIAS: 1
TASTE DISTURBANCE: 1
INCREASED ENERGY: 1

## 2018-11-26 ENCOUNTER — ONCOLOGY VISIT (OUTPATIENT)
Dept: ONCOLOGY | Facility: CLINIC | Age: 83
End: 2018-11-26
Attending: INTERNAL MEDICINE
Payer: MEDICARE

## 2018-11-26 ENCOUNTER — RADIANT APPOINTMENT (OUTPATIENT)
Dept: ULTRASOUND IMAGING | Facility: CLINIC | Age: 83
End: 2018-11-26
Attending: INTERNAL MEDICINE
Payer: MEDICARE

## 2018-11-26 ENCOUNTER — PRE VISIT (OUTPATIENT)
Dept: ONCOLOGY | Facility: CLINIC | Age: 83
End: 2018-11-26

## 2018-11-26 VITALS
BODY MASS INDEX: 29.6 KG/M2 | HEIGHT: 58 IN | WEIGHT: 141 LBS | SYSTOLIC BLOOD PRESSURE: 126 MMHG | OXYGEN SATURATION: 99 % | HEART RATE: 63 BPM | TEMPERATURE: 97.8 F | DIASTOLIC BLOOD PRESSURE: 70 MMHG

## 2018-11-26 DIAGNOSIS — E05.00 GRAVES DISEASE: ICD-10-CM

## 2018-11-26 DIAGNOSIS — E04.2 NON-TOXIC MULTINODULAR GOITER: ICD-10-CM

## 2018-11-26 DIAGNOSIS — C66.1 UROTHELIAL CARCINOMA OF RIGHT DISTAL URETER (H): Primary | ICD-10-CM

## 2018-11-26 LAB
T3 SERPL-MCNC: 118 NG/DL (ref 60–181)
T4 FREE SERPL-MCNC: 1.16 NG/DL (ref 0.76–1.46)
TSH SERPL DL<=0.005 MIU/L-ACNC: <0.01 MU/L (ref 0.4–4)

## 2018-11-26 PROCEDURE — G0463 HOSPITAL OUTPT CLINIC VISIT: HCPCS | Mod: ZF

## 2018-11-26 PROCEDURE — 99205 OFFICE O/P NEW HI 60 MIN: CPT | Mod: GC | Performed by: INTERNAL MEDICINE

## 2018-11-26 PROCEDURE — 84480 ASSAY TRIIODOTHYRONINE (T3): CPT | Performed by: INTERNAL MEDICINE

## 2018-11-26 ASSESSMENT — PAIN SCALES - GENERAL: PAINLEVEL: NO PAIN (0)

## 2018-11-26 NOTE — LETTER
"11/26/2018       RE: Dimple Oviedo  [address]  [facility] [address]     Dear Colleague,    Thank you for referring your patient, Dimple Oviedo, to the [facility]. Please see a copy of my visit note below.    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 She is here with daughter, son and daughter in law. She underwent rt nephroureterectomy 2 weeks ago for high grade TCC of the rt renal pelvis by Dr. King.     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    2/19/18 and 4/3/18- cystoscopies with bladder biopsies  which were negative for bladder tumor  1/17/18, 3/8/18, 7/26/18, 9/10/18- Multiple urine cytologies have come back positive by FISH for high-grade transitional cell carcinoma  9/10/18- Underwent a bilateral cystoureteroscopy with biopsy and brushing that showed  right upper tract cytology suspicious for high-grade urothelial ca. Right ureter brushing negative from that day. Left upper tract negative.  9/10/18- CT A/P without contrast showed new small bilateral pleural effusions and interstitial pulmonary edema but no evidence of locoregional or metastatic disease.  9/12/18- Presented to ED with oliguria, CRESENCIO, and mild hydronephrosis bilaterally on CT scan and underwent bilateral ureteral stent placment  11/13/2018- Right robotic nephroureterectomy, excision of sandip-caval mass and LN excision by Stuart King. Pathology showed \"Right " "nephroureterectomy: Invasive high grade urothelial carcinoma measuring 3.5 cm, located in renal pelvis and invading through renal parenchyma into perinephric fat. Urothelial carcinoma in-situ present. Margins free of tumor. Ana-caval mass: Metastatic urothelial carcinoma involving one lymph node with at least 1 cm tumor deposit. Pericaval Extranodal Extension present. Five additional benign pericaval lymph nodes. Pathologic stage: pT4N1 (1 of 6 lymph nodes).\"    Interval Hx:  She is recovering from the surgery. Currently she lives at sons place but prior to surgery, she was pretty independent with good performance status and lived in an assisted living facility but had her own apt. She has chronic urinary incontinence. Hematuria is now resolved. She continues to have b/l LE edema from ?lymphedema that is stable to slightly improved. She has some fatigue but is able to walk around at her home with walker and up on her feet most of the day time. She has hearing aid for many years that is stable. She has been diagnosed with Graves disease in Dec 2017 when she had diarrhea, 30 lb wt loss, Afib with RVR and stress cardiomyopathy, hoarse voice. She has been methimazole and propranolol with improvement in most symptoms and normal cardiac function with ECHO showing LVEF of 55-60% in 09/2018. She denies orthopnea, PND, exertional SOB., neuropathy symptoms, headaches, vision problems, chest pain, cough, wheezing, wt loss, abdominal pain, nausea, vomiting.      REVIEW OF SYSTEMS: 14 point ROS negative other than the symptoms noted above in the HPI.    PAST MEDICAL AND SURGICAL HISTORY:   Active Ambulatory Problems     Diagnosis Date Noted     Esophageal reflux 09/22/2004     Restless leg syndrome 10/12/2005     heat intolerance 10/12/2005     Goiter 10/12/2005     Disorder of bone and cartilage 10/12/2005     Other psoriasis 10/12/2005     perirectal cyst 12/16/2005     Malignant melanoma of skin of trunk, except scrotum (H)  "     Nontoxic multinodular goiter      Hyperlipidemia LDL goal <130 05/09/2010     Hypertension goal BP (blood pressure) < 140/90 11/30/2011     Urinary incontinence 11/30/2011     Osteoarthritis of left knee 11/30/2011     Hip arthritis 06/23/2014     Polymyalgia rheumatica (H) 07/10/2014     High risk medication use 10/15/2014     Shoulder pain 10/31/2014     Impaired fasting blood sugar 11/24/2015     Chronic bilateral low back pain without sciatica 12/05/2016     Obesity, unspecified obesity severity, unspecified obesity type 02/22/2017     Iron deficiency anemia, unspecified iron deficiency anemia type 11/30/2017     NSTEMI (non-ST elevated myocardial infarction) (H) 12/13/2017     Stress-induced cardiomyopathy 12/14/2017     A-fib (H) 01/16/2018     Anxiety 02/07/2018     Chronic systolic heart failure (H) 02/20/2018     Urothelial carcinoma (H) 03/22/2018     Hyperthyroidism 06/28/2018     Restless legs syndrome 07/11/2018     CRESENCIO (acute kidney injury) (H) 09/11/2018     Hydronephrosis 09/11/2018     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     Resolved Ambulatory Problems     Diagnosis Date Noted     Undiagnosed cardiac murmurs      Knee pain 01/09/2013     Disorder of bursae and tendons in shoulder region 09/30/2014     Sepsis (H) 10/30/2015     Shoulder joint pain, unspecified laterality 03/30/2016     Injury of right rotator cuff 03/30/2016     Atrial fibrillation, unspecified type (H) 01/25/2018     (HFpEF) heart failure with preserved ejection fraction (H) 07/06/2018     Past Medical History:   Diagnosis Date     Calculus of kidney      Esophageal reflux      GERD (gastroesophageal reflux disease)      Hyperlipidemia LDL goal <130 5/9/2010     Malignant melanoma of skin of trunk, except scrotum (H)      Nonspecific abnormal finding      Nontoxic multinodular goiter      Osteopenia      Other psoriasis      Personal history of colonic polyps      PMR (polymyalgia rheumatica) (H)       Stress-induced cardiomyopathy      Undiagnosed cardiac murmurs      Unspecified constipation      Unspecified essential hypertension      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     SOCIAL HISTORY:   Social History   Substance Use Topics     Smoking status: Never Smoker     Smokeless tobacco: Never Used     Alcohol use No      Comment: 6 times per year-one drink     FAMILY HISTORY:   Family History   Problem Relation Age of Onset     Cancer Father      dec - esophageal and laryngeal     HEART DISEASE Mother      Respiratory Mother      dec     Breast Cancer Daughter      Other Cancer Daughter      Thyroid Disease Daughter      Asthma Daughter      Hyperlipidemia Son      Diabetes Son        ALLERGIES:   Allergies   Allergen Reactions     Codeine        CURRENT MEDICATIONS:   Current Outpatient Prescriptions:      acetaminophen (TYLENOL) 650 MG CR tablet, Take 1 tablet (650 mg) by mouth every 8 hours as needed for pain, Disp: 100 tablet, Rfl: 0     alendronate (FOSAMAX) 70 MG tablet, TAKE 1 TABLET EVERY 7 DAYS AT LEAST 60 MINUTES BEFORE BREAKFAST AS DIRECTED. SEE PACKAGE FOR ADDITIONAL INSTRUCTIONS (Patient taking differently: TAKE 1 TABLET EVERY 7 DAYS AT LEAST 60 MINUTES BEFORE BREAKFAST AS DIRECTED. (Tuesdays0), Disp: 12 tablet, Rfl: 0     aspirin 81 MG tablet, Take 81 mg by mouth every evening , Disp: , Rfl:      Calcium Citrate-Vitamin D (CALCIUM + D PO), Take 1 tablet by mouth 2 times daily , Disp: , Rfl:      colestipol (COLESTID) 1 g tablet, Take 1 g by mouth 2 times daily TAKE OTHER MEDS 1 HOUR BEFORE OR 4 HOURS AFTER COLESID, Disp: , Rfl:      cycloSPORINE (RESTASIS) 0.05 % ophthalmic emulsion, Place 1 drop into both eyes 2 times daily, Disp: , Rfl:      ferrous sulfate 325 (65 Fe) MG TBEC EC tablet, Take 325 mg by mouth daily, Disp: , Rfl:      furosemide (LASIX) 20 MG tablet, Take 1 tablet (20 mg) by mouth every morning, Disp: 90 tablet, Rfl: 1     irbesartan (AVAPRO) 300 MG tablet, TAKE 1 TABLET EVERY DAY  (Patient taking differently: Take 300 mg by mouth every morning ), Disp: 90 tablet, Rfl: 2     lovastatin (MEVACOR) 40 MG tablet, TAKE 1 TABLET AT BEDTIME (HYPERLIPIDEMIA LDL GOAL BELOW 130), Disp: 90 tablet, Rfl: 2     Melatonin 10 MG TABS tablet, Take 10 mg by mouth as needed for sleep , Disp: , Rfl:      methimazole (TAPAZOLE) 5 MG tablet, Take 1 tablet (5 mg) by mouth 2 times daily, Disp: 180 tablet, Rfl: 3     Omega-3 Fatty Acids (FISH OIL) 500 MG CAPS, Take 1 capsule by mouth 2 times daily , Disp: , Rfl:      omeprazole (PRILOSEC) 20 MG CR capsule, Take 1 capsule (20 mg) by mouth daily (Patient taking differently: Take 20 mg by mouth every morning ), Disp: 180 capsule, Rfl: 3     order for DME, Equipment being ordered: Knee high compression for B LE 20-30mmHg, Velcro units for night time (consider hybrid sock as foot swelling is less than leg swelling), Donning device, Disp: 1 each, Rfl: 2     oxyCODONE IR (ROXICODONE) 5 MG tablet, Take 1 tablet (5 mg) by mouth every 4 hours as needed for moderate to severe pain, Disp: 15 tablet, Rfl: 0     Probiotic Product (PROBIOTIC ADVANCED PO), Take 1 capsule by mouth every morning , Disp: , Rfl:      propranolol (INDERAL LA) 60 MG 24 hr capsule, Take 1 capsule (60 mg) by mouth daily (Patient taking differently: Take 60 mg by mouth every morning ), Disp: 90 capsule, Rfl: 1     rOPINIRole (REQUIP) 0.25 MG tablet, Take 1 tablet (0.25 mg) by mouth 2 times daily as needed (restless leg syndrome), Disp: 180 tablet, Rfl: 1     senna-docusate (SENOKOT-S;PERICOLACE) 8.6-50 MG per tablet, Take 1 tablet by mouth 2 times daily To prevent constipation, Disp: 60 tablet, Rfl: 0     sertraline (ZOLOFT) 100 MG tablet, Take 1 tablet (100 mg) by mouth every morning, Disp: 90 tablet, Rfl: 1     traZODone (DESYREL) 50 MG tablet, TAKE 1/2 TABLET(25 MG)  AT BEDTIME, Disp: 45 tablet, Rfl: 1     HYDROcodone-acetaminophen (NORCO) 5-325 MG per tablet, Take 1 tablet by mouth every 6 hours as needed  "for severe pain (Patient not taking: Reported on 10/31/2018), Disp: 10 tablet, Rfl: 0     nitroGLYcerin (NITROSTAT) 0.4 MG sublingual tablet, For chest pain place 1 tablet under the tongue every 5 minutes for 3 doses. If symptoms persist 5 minutes after 1st dose call 911. (Patient not taking: Reported on 10/31/2018), Disp: 25 tablet, Rfl: 3    PHYSICAL EXAMINATION:  Vital signs: /70  Pulse 63  Temp 97.8  F (36.6  C) (Oral)  Ht 1.461 m (4' 9.5\")  Wt 64 kg (141 lb)  SpO2 99%  BMI 29.98 kg/m2  ECOG performance status of 2.    GENERAL: Well-nourished healthy-appearing sitting in chair, no acute distress.   HEENT: No icterus, no pallor. Moist mucous membranes. Oropharynx is clear.   NECK: Supple, no JVD/LAD.  LUNGS: Clear to ausculation bilaterally, normal work of breathing.   CARDIOVASCULAR: Regular rate and rhythm, no murmurs, gallops or rubs.   ABDOMEN: Soft, nontender and nondistended, no palpable masses, bowel sounds present.  EXTREMITIES: No cyanosis, no clubbing, B/L Le edema up to knee jt.   NEUROLOGIC: No focal deficits, CN 2-12 intact.  LYMPH NODE EXAM: No palpable adenopathy - cervical, axillary or inguinal.     LABORATORY DATA:  CBC RESULTS:   Recent Labs   Lab Test  11/15/18   0758   WBC  10.0   RBC  3.69*   HGB  10.1*   HCT  33.0*   MCV  89   MCH  27.4   MCHC  30.6*   RDW  15.8*   PLT  230       Recent Labs   Lab Test  11/15/18   0758  11/14/18   0632   NA  135  136   POTASSIUM  4.7  4.7   CHLORIDE  104  104   CO2  28  27   ANIONGAP  3  6   GLC  94  98   BUN  13  17   CR  1.05*  1.00   SHREYA  8.2*  8.2*     Liver Function Studies -   Recent Labs   Lab Test  09/11/18   0612   PROTTOTAL  6.5*   ALBUMIN  3.1*   BILITOTAL  0.7   ALKPHOS  129   AST  103*   ALT  92*     IMAGING STUDIES:      ASSESSMENT AND PLAN:    86 yo F with multiple co morbidities including Hx of HTN, HLD, Graves disease, A-fib (not on AC), stress cardiomyopathy (now resolved), chronic lymphedema p/w persistent hematuria and " diagnosis of high grade TCC of rt renal pelvis s/p Right robotic nephroureterectomy here to discuss adjuvant therapy.    High grade TCC of rt renal pelvis (pT4 (3.5 cm)pN1 (1/6 LN) Mx- Stage IV per AJCC) with invasion through renal parenchyma into perinephric fat.  Margins are free of tumor.  She was also found to have a locally metastatic sandip-caval tumor deposit ( at least 1 cm) mass and 1/6 LN involvement and pericaval Extranodal Extension. Most recent imaging on 9/10/18 (CT A/P without contrast) showed new small bilateral pleural effusions and a right cardiophrenic lymph node measuring 18 mm in length, that  increased from 14 mm in 1/16/2018, however, no obvious evidence of locoregional or metastatic disease. We reviewed the pathology results and most recent imaging findings.  She is here to discuss adjuvant chemotherapy options. The recently presented POUT trial in ASCO  2018 (no full publication yet-https://meetinglibrary.asco.org/record/068608/abstract) assessed the added benefit of using platinum-based chemotherapy in the adjuvant setting in patients following radical nephroureterectomy.  The POUT trial, enrolled 260 patients with locally advanced or node-positive UTUC (pT2-T4 N0-3 M0) randomized (1:1) to 4 cycles of gemcitabine-cisplatin (gem-cis-GFR >50)/gem-carboplatin (GFR 30-49 ml/min) or surveillance following a nephroureterectomy. A significant difference in DSF (HR 0.49 [CI 0.31-0.76], p=0.001) was observed at a median follow-up of 17.3 months.  Two year DFS was 51% for surveillance (95% CI: 39, 61) and 70% for chemotherapy (95% CI: 58, 79). PFS favoured chemotherapy: HR = 0.49 (95% CI: 0.30, 0.79, p = 0.003). A trend to improved OS was seen but statistically insignificant.   She has no absolute contraindication for cisplatin chemotherapy (no hearing loss (has chronic hearing aid), neuropathy, CRESENCIO, ECOG performance status >2). Last Cr was 1.0 with GFR of 50, prior to surgery baseline Cr was 0.8 GFR  of >60. She does have a Hx of Graves disease, that triggered A-fib, and stress cardiomyopathy in Dec 2017 but has now recovered heart function (EF 55-60% in Sep 2018). She has no signs of symptoms of heart failure clinically but she has b/l LE edema that is thought to be chronic lymphedema that is chronic since  Dec 2017 and is improving. It is also possible that the lymphedema might have been from the pericaval mass impinging on the IVC and causing mechanical compression. She is still 2 weeks into surgery and is recovering and remains to be seen if lymphedema will improve.   We discussed that her options include chemotherapy (cis+gem or carbo+gem-depending yumiko GFR) or observation. We discussed that based on the findings from the POUT trial she could be a candidate for chemotherapy (on the trial, age of pt ranged upto 88 yrs) given incremental benefit in DFS about of about 20% but this comes at the caveat of side effects. Grade 3 or higher toxicities were reported in 53.2% of the chemotherapy recipients and 13.5% of the surveillance group with neutropenia in 24.3% of those treated with gemcitabine/ cisplatin and 37.3% of those given gemcitabine/carboplatin; nausea in 2.9% and 7.8%, respectively; vomiting in 1.4% and 9.8%; and febrile neutropenia in 5.7% and 7.8%.  We discussed the common and uncommon side -effects of the chemotherapy regimens and provided handouts to the pt. We would like to see pt in 2 weeks with a prior PET/CT ( to make sure she has no metastatic disease) and labs including CBC, CMP. The pt will make the decision to start chemotherapy at that appt.  All of the above was explained to pt and family in lay language and several questions were answered. The pt is in agreement with the plan.    Plan  -RTC in ~3 weeks with prior labs and PET/CT  -Decison on chemotherapy based on labs and PET/CT findings    Staffed with Dr. Toledo.  Torsten Anton  Hem Onc fellow  [phone]    ATTENDING ATTESTATION  "NOTE  I, Jaden Toledo, personally examined and evaluated this patient. I personally reviewed vital signs, medications, labs, imaging and pathology data. I discussed the patient with the fellow, Dr. Anton, and agree with the assessment and plan of care as documented in this consult note from today.   Garcia findings: Ms. Oviedo is a delightful 86yo F with recently diagnosed stage IV, high-risk, high-grade transitional cell carcinoma of right renal pelvis, who is here to establish care.   Ms. Oviedo was found to have gross hematuria in Dec 2017 and was referred to urology for further management. Multiple urine cytologies (1/17/18, 3/8/18, 7/26/18, 9/10/18) have come back positive by FISH for high-grade transitional cell carcinoma since then. She's also had two cystoscopies with bladder biopsies (2/19/18 and 4/3/18) which were negative for bladder tumor. Underwent a bilateral cystoureteroscopy with biopsy and brushing on 9/10/18 - Left upper tract negative and right upper tract cytology suspicious for high-grade urothelial ca on 9/10/18. Right ureter brushing negative from that day.   Based on these findings, she underwent a right robotic nephroureterectomy on 11/13/18.  Pathology showed \"Right nephroureterectomy: Invasive high grade urothelial carcinoma measuring 3.5 cm, located in renal pelvis and invading through renal parenchyma into perinephric fat. Urothelial carcinoma in-situ present. Margins free of tumor. Ana-caval mass: Metastatic urothelial carcinoma involving one lymph node with at least 1 cm tumor deposit. Pericaval Extranodal Extension present. Five additional benign pericaval lymph nodes. Pathologic stage: pT4N1 (1 of 6 lymph nodes).\"  CT A/P without contrast 9/10/18 showed new small bilateral pleural effusions and interstitial pulmonary edema but no evidence of locoregional or metastatic disease.  Assessment and plan: An 86yo lady with newly diagnosed localized stage IV ([id number]) status post right robotic " nephroureterectomy, here for discussion of adjuvant treatment options.   - Patient is independent and living by self. No clear contraindications for cisplatin based therapy but she has a long list of comorbidities that are expected diminish her ability to tolerate adjuvant chemotherapy. She also has multiple chronic cardiac issues but recent ECHO (9/10/18) showed LVEF of 55-60%  - ECOG PS 2-3 primarily because she's only 2 weeks out from the surgery. Given her baseline performance status (pre-surgery) of 0, independent living at home and excellent family support, I expect that her PS will improve in another 2-3 weeks.  - Discussed disease biology and pertinent diagnostic data including surg path report at length with patient, children (son and daughter) and daughter-in-law at length. Explained that given the localized but stage IV (pT4N1) disease, likelihood of recurrence with no further therapy is 50-70%. We don't have a trial for her at this time, but standard-of-care options would include observation versus adjuvant chemotherapy (cisplatin plus gemcitabine or carboplatin plus gemcitabine) as it's shown improvement in recurrence-free survival and likelihood of recurrence in retrospective data and prelim results of the POUT trial presented at ASCO 2018.  - POUT trial was a randomized phase III trial comparing 4 cycles of adjuvant chemotherapy (carboplatin plus gemcitabine or cisplatin plus gemcitabine) versus active surveillance in patients with upper tract urothelial cancers at high risk of disease recurrence.  In this trial, it was shown that adjuvant chemotherapy results decreased the recurrence risk from 50% in the surveillance arm at 2 years to 30% in the chemotherapy arm at 2 years.   - We also explained that this benefit comes at a significant cost of side effects, which would be made worse with her age, chronic comorbidities, and independent living status.   - I reviewed the side effect of  cisplatin/carboplatin and gemcitabine chemotherapy at length including fatigue, nausea, vomiting, diarrhea, myelosuppression, nephropathy, neuropathy, and other side effects including second malignacies etc.  Information printouts were provided.   - After the patient was explained this data, she expressed a strong and unanimous interest in pursuing chemotherapy.  - Will get a restaging PET/CT whole body in 2-3 weeks to establish a post-surgery baseline.  - If this shows no metastatic disease and patient has recovered well, will plan to start chemotherapy in mid Dec (she wants to start before ).   - Consider PORT placement in the coming weeks.     Patient and family given opportunity to ask questions, which were answered to their satisfaction. They are in complete agreement with this plan.    I appreciate Dr. King's referral and will send him this note for review.    BILLIN.    Jaden Toledo M.D.  . Professor of Medicine  Genitourinary Oncology  Division of Hematology, Oncology & Transplantation  [facility]

## 2018-11-26 NOTE — NURSING NOTE
"Oncology Rooming Note    November 26, 2018 2:33 PM   Dimple Oviedo is a 85 year old female who presents for:    Chief Complaint   Patient presents with     Oncology Clinic Visit     Renal Cell CA; Tx options     Initial Vitals: /70  Pulse 63  Temp 97.8  F (36.6  C) (Oral)  Ht 1.461 m (4' 9.5\")  Wt 64 kg (141 lb)  SpO2 99%  BMI 29.98 kg/m2 Estimated body mass index is 29.98 kg/(m^2) as calculated from the following:    Height as of this encounter: 1.461 m (4' 9.5\").    Weight as of this encounter: 64 kg (141 lb). Body surface area is 1.61 meters squared.  No Pain (0) Comment: Data Unavailable   No LMP recorded. Patient is postmenopausal.  Allergies reviewed: Yes  Medications reviewed: Yes    Medications: Medication refills not needed today.  Pharmacy name entered into [employer]:    [facility] [address] AT [address] PHARMACY MAIL DELIVERY - [facility] [address] [place] [address] RD      10 minutes for nursing intake (face to face time)     Macey Forbes CMA              "

## 2018-11-26 NOTE — PROGRESS NOTES
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 She is here with daughter, son and daughter in law. She underwent rt nephroureterectomy 2 weeks ago for high grade TCC of the rt renal pelvis by Dr. King.     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    2/19/18 and 4/3/18- cystoscopies with bladder biopsies  which were negative for bladder tumor  1/17/18, 3/8/18, 7/26/18, 9/10/18- Multiple urine cytologies have come back positive by FISH for high-grade transitional cell carcinoma  9/10/18- Underwent a bilateral cystoureteroscopy with biopsy and brushing that showed  right upper tract cytology suspicious for high-grade urothelial ca. Right ureter brushing negative from that day. Left upper tract negative.  9/10/18- CT A/P without contrast showed new small bilateral pleural effusions and interstitial pulmonary edema but no evidence of locoregional or metastatic disease.  9/12/18- Presented to ED with oliguria, CRESENCIO, and mild hydronephrosis bilaterally on CT scan and underwent bilateral ureteral stent placment  11/13/2018- Right robotic nephroureterectomy, excision of ana-caval mass and LN excision by Stuart King. Pathology showed \"Right nephroureterectomy: Invasive high grade urothelial carcinoma measuring 3.5 cm, located in renal pelvis and invading through renal parenchyma into perinephric fat. Urothelial carcinoma in-situ present. Margins free of tumor. Ana-caval mass: Metastatic " "urothelial carcinoma involving one lymph node with at least 1 cm tumor deposit. Pericaval Extranodal Extension present. Five additional benign pericaval lymph nodes. Pathologic stage: pT4N1 (1 of 6 lymph nodes).\"    Interval Hx:  She is recovering from the surgery. Currently she lives at sons place but prior to surgery, she was pretty independent with good performance status and lived in an assisted living facility but had her own apt. She has chronic urinary incontinence. Hematuria is now resolved. She continues to have b/l LE edema from ?lymphedema that is stable to slightly improved. She has some fatigue but is able to walk around at her home with walker and up on her feet most of the day time. She has hearing aid for many years that is stable. She has been diagnosed with Graves disease in Dec 2017 when she had diarrhea, 30 lb wt loss, Afib with RVR and stress cardiomyopathy, hoarse voice. She has been methimazole and propranolol with improvement in most symptoms and normal cardiac function with ECHO showing LVEF of 55-60% in 09/2018. She denies orthopnea, PND, exertional SOB., neuropathy symptoms, headaches, vision problems, chest pain, cough, wheezing, wt loss, abdominal pain, nausea, vomiting.      REVIEW OF SYSTEMS: 14 point ROS negative other than the symptoms noted above in the HPI.    PAST MEDICAL AND SURGICAL HISTORY:   Active Ambulatory Problems     Diagnosis Date Noted     Esophageal reflux 09/22/2004     Restless leg syndrome 10/12/2005     heat intolerance 10/12/2005     Goiter 10/12/2005     Disorder of bone and cartilage 10/12/2005     Other psoriasis 10/12/2005     perirectal cyst 12/16/2005     Malignant melanoma of skin of trunk, except scrotum (H)      Nontoxic multinodular goiter      Hyperlipidemia LDL goal <130 05/09/2010     Hypertension goal BP (blood pressure) < 140/90 11/30/2011     Urinary incontinence 11/30/2011     Osteoarthritis of left knee 11/30/2011     Hip arthritis 06/23/2014     " Polymyalgia rheumatica (H) 07/10/2014     High risk medication use 10/15/2014     Shoulder pain 10/31/2014     Impaired fasting blood sugar 11/24/2015     Chronic bilateral low back pain without sciatica 12/05/2016     Obesity, unspecified obesity severity, unspecified obesity type 02/22/2017     Iron deficiency anemia, unspecified iron deficiency anemia type 11/30/2017     NSTEMI (non-ST elevated myocardial infarction) (H) 12/13/2017     Stress-induced cardiomyopathy 12/14/2017     A-fib (H) 01/16/2018     Anxiety 02/07/2018     Chronic systolic heart failure (H) 02/20/2018     Urothelial carcinoma (H) 03/22/2018     Hyperthyroidism 06/28/2018     Restless legs syndrome 07/11/2018     CRESENCIO (acute kidney injury) (H) 09/11/2018     Hydronephrosis 09/11/2018     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     Resolved Ambulatory Problems     Diagnosis Date Noted     Undiagnosed cardiac murmurs      Knee pain 01/09/2013     Disorder of bursae and tendons in shoulder region 09/30/2014     Sepsis (H) 10/30/2015     Shoulder joint pain, unspecified laterality 03/30/2016     Injury of right rotator cuff 03/30/2016     Atrial fibrillation, unspecified type (H) 01/25/2018     (HFpEF) heart failure with preserved ejection fraction (H) 07/06/2018     Past Medical History:   Diagnosis Date     Calculus of kidney      Esophageal reflux      GERD (gastroesophageal reflux disease)      Hyperlipidemia LDL goal <130 5/9/2010     Malignant melanoma of skin of trunk, except scrotum (H)      Nonspecific abnormal finding      Nontoxic multinodular goiter      Osteopenia      Other psoriasis      Personal history of colonic polyps      PMR (polymyalgia rheumatica) (H)      Stress-induced cardiomyopathy      Undiagnosed cardiac murmurs      Unspecified constipation      Unspecified essential hypertension      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     SOCIAL HISTORY:   Social History   Substance Use Topics     Smoking status:  Never Smoker     Smokeless tobacco: Never Used     Alcohol use No      Comment: 6 times per year-one drink     FAMILY HISTORY:   Family History   Problem Relation Age of Onset     Cancer Father      dec - esophageal and laryngeal     HEART DISEASE Mother      Respiratory Mother      dec     Breast Cancer Daughter      Other Cancer Daughter      Thyroid Disease Daughter      Asthma Daughter      Hyperlipidemia Son      Diabetes Son        ALLERGIES:   Allergies   Allergen Reactions     Codeine        CURRENT MEDICATIONS:   Current Outpatient Prescriptions:      acetaminophen (TYLENOL) 650 MG CR tablet, Take 1 tablet (650 mg) by mouth every 8 hours as needed for pain, Disp: 100 tablet, Rfl: 0     alendronate (FOSAMAX) 70 MG tablet, TAKE 1 TABLET EVERY 7 DAYS AT LEAST 60 MINUTES BEFORE BREAKFAST AS DIRECTED. SEE PACKAGE FOR ADDITIONAL INSTRUCTIONS (Patient taking differently: TAKE 1 TABLET EVERY 7 DAYS AT LEAST 60 MINUTES BEFORE BREAKFAST AS DIRECTED. (Tuesdays0), Disp: 12 tablet, Rfl: 0     aspirin 81 MG tablet, Take 81 mg by mouth every evening , Disp: , Rfl:      Calcium Citrate-Vitamin D (CALCIUM + D PO), Take 1 tablet by mouth 2 times daily , Disp: , Rfl:      colestipol (COLESTID) 1 g tablet, Take 1 g by mouth 2 times daily TAKE OTHER MEDS 1 HOUR BEFORE OR 4 HOURS AFTER COLESID, Disp: , Rfl:      cycloSPORINE (RESTASIS) 0.05 % ophthalmic emulsion, Place 1 drop into both eyes 2 times daily, Disp: , Rfl:      ferrous sulfate 325 (65 Fe) MG TBEC EC tablet, Take 325 mg by mouth daily, Disp: , Rfl:      furosemide (LASIX) 20 MG tablet, Take 1 tablet (20 mg) by mouth every morning, Disp: 90 tablet, Rfl: 1     irbesartan (AVAPRO) 300 MG tablet, TAKE 1 TABLET EVERY DAY (Patient taking differently: Take 300 mg by mouth every morning ), Disp: 90 tablet, Rfl: 2     lovastatin (MEVACOR) 40 MG tablet, TAKE 1 TABLET AT BEDTIME (HYPERLIPIDEMIA LDL GOAL BELOW 130), Disp: 90 tablet, Rfl: 2     Melatonin 10 MG TABS tablet, Take 10  mg by mouth as needed for sleep , Disp: , Rfl:      methimazole (TAPAZOLE) 5 MG tablet, Take 1 tablet (5 mg) by mouth 2 times daily, Disp: 180 tablet, Rfl: 3     Omega-3 Fatty Acids (FISH OIL) 500 MG CAPS, Take 1 capsule by mouth 2 times daily , Disp: , Rfl:      omeprazole (PRILOSEC) 20 MG CR capsule, Take 1 capsule (20 mg) by mouth daily (Patient taking differently: Take 20 mg by mouth every morning ), Disp: 180 capsule, Rfl: 3     order for DME, Equipment being ordered: Knee high compression for B LE 20-30mmHg, Velcro units for night time (consider hybrid sock as foot swelling is less than leg swelling), Donning device, Disp: 1 each, Rfl: 2     oxyCODONE IR (ROXICODONE) 5 MG tablet, Take 1 tablet (5 mg) by mouth every 4 hours as needed for moderate to severe pain, Disp: 15 tablet, Rfl: 0     Probiotic Product (PROBIOTIC ADVANCED PO), Take 1 capsule by mouth every morning , Disp: , Rfl:      propranolol (INDERAL LA) 60 MG 24 hr capsule, Take 1 capsule (60 mg) by mouth daily (Patient taking differently: Take 60 mg by mouth every morning ), Disp: 90 capsule, Rfl: 1     rOPINIRole (REQUIP) 0.25 MG tablet, Take 1 tablet (0.25 mg) by mouth 2 times daily as needed (restless leg syndrome), Disp: 180 tablet, Rfl: 1     senna-docusate (SENOKOT-S;PERICOLACE) 8.6-50 MG per tablet, Take 1 tablet by mouth 2 times daily To prevent constipation, Disp: 60 tablet, Rfl: 0     sertraline (ZOLOFT) 100 MG tablet, Take 1 tablet (100 mg) by mouth every morning, Disp: 90 tablet, Rfl: 1     traZODone (DESYREL) 50 MG tablet, TAKE 1/2 TABLET(25 MG)  AT BEDTIME, Disp: 45 tablet, Rfl: 1     HYDROcodone-acetaminophen (NORCO) 5-325 MG per tablet, Take 1 tablet by mouth every 6 hours as needed for severe pain (Patient not taking: Reported on 10/31/2018), Disp: 10 tablet, Rfl: 0     nitroGLYcerin (NITROSTAT) 0.4 MG sublingual tablet, For chest pain place 1 tablet under the tongue every 5 minutes for 3 doses. If symptoms persist 5 minutes after  "1st dose call 911. (Patient not taking: Reported on 10/31/2018), Disp: 25 tablet, Rfl: 3    PHYSICAL EXAMINATION:  Vital signs: /70  Pulse 63  Temp 97.8  F (36.6  C) (Oral)  Ht 1.461 m (4' 9.5\")  Wt 64 kg (141 lb)  SpO2 99%  BMI 29.98 kg/m2  ECOG performance status of 2.    GENERAL: Well-nourished healthy-appearing sitting in chair, no acute distress.   HEENT: No icterus, no pallor. Moist mucous membranes. Oropharynx is clear.   NECK: Supple, no JVD/LAD.  LUNGS: Clear to ausculation bilaterally, normal work of breathing.   CARDIOVASCULAR: Regular rate and rhythm, no murmurs, gallops or rubs.   ABDOMEN: Soft, nontender and nondistended, no palpable masses, bowel sounds present.  EXTREMITIES: No cyanosis, no clubbing, B/L Le edema up to knee jt.   NEUROLOGIC: No focal deficits, CN 2-12 intact.  LYMPH NODE EXAM: No palpable adenopathy - cervical, axillary or inguinal.     LABORATORY DATA:  CBC RESULTS:   Recent Labs   Lab Test  11/15/18   0758   WBC  10.0   RBC  3.69*   HGB  10.1*   HCT  33.0*   MCV  89   MCH  27.4   MCHC  30.6*   RDW  15.8*   PLT  230       Recent Labs   Lab Test  11/15/18   0758  11/14/18   0632   NA  135  136   POTASSIUM  4.7  4.7   CHLORIDE  104  104   CO2  28  27   ANIONGAP  3  6   GLC  94  98   BUN  13  17   CR  1.05*  1.00   SHREYA  8.2*  8.2*     Liver Function Studies -   Recent Labs   Lab Test  09/11/18   0612   PROTTOTAL  6.5*   ALBUMIN  3.1*   BILITOTAL  0.7   ALKPHOS  129   AST  103*   ALT  92*     IMAGING STUDIES:      ASSESSMENT AND PLAN:    86 yo F with multiple co morbidities including Hx of HTN, HLD, Graves disease, A-fib (not on AC), stress cardiomyopathy (now resolved), chronic lymphedema p/w persistent hematuria and diagnosis of high grade TCC of rt renal pelvis s/p Right robotic nephroureterectomy here to discuss adjuvant therapy.    High grade TCC of rt renal pelvis (pT4 (3.5 cm)pN1 (1/6 LN) Mx- Stage IV per AJCC) with invasion through renal parenchyma into perinephric " fat.  Margins are free of tumor. She was also found to have a locally metastatic sandip-caval tumor deposit ( at least 1 cm) mass and 1/6 LN involvement and pericaval Extranodal Extension. Most recent imaging on 9/10/18 (CT A/P without contrast) showed new small bilateral pleural effusions and a right cardiophrenic lymph node measuring 18 mm in length, that  increased from 14 mm in 1/16/2018, however, no obvious evidence of locoregional or metastatic disease. We reviewed the pathology results and most recent imaging findings.  She is here to discuss adjuvant chemotherapy options. The recently presented POUT trial in [facility]  2018 (no full publication yet-https://meetinglibrary.asco.org/record/[email or web address]/abstract) assessed the added benefit of using platinum-based chemotherapy in the adjuvant setting in patients following radical nephroureterectomy.  The POUT trial, enrolled 260 patients with locally advanced or node-positive UTUC (pT2-T4 N0-3 M0) randomized (1:1) to 4 cycles of gemcitabine-cisplatin (gem-cis-GFR >50)/gem-carboplatin (GFR 30-49 ml/min) or surveillance following a nephroureterectomy. A significant difference in DSF (HR 0.49 [CI 0.31-0.76], p=0.001) was observed at a median follow-up of 17.3 months.  Two year DFS was 51% for surveillance (95% CI: 39, 61) and 70% for chemotherapy (95% CI: 58, 79). PFS favoured chemotherapy: HR = 0.49 (95% CI: 0.30, 0.79, p = 0.003). A trend to improved OS was seen but statistically insignificant.   She has no absolute contraindication for cisplatin chemotherapy (no hearing loss (has chronic hearing aid), neuropathy, CRESENCIO, ECOG performance status >2). Last Cr was 1.0 with GFR of 50, prior to surgery baseline Cr was 0.8 GFR of >60. She does have a Hx of Graves disease, that triggered A-fib, and stress cardiomyopathy in Dec 2017 but has now recovered heart function (EF 55-60% in Sep 2018). She has no signs of symptoms of heart failure clinically but she has b/l LE edema that is  thought to be chronic lymphedema that is chronic since  Dec 2017 and is improving. It is also possible that the lymphedema might have been from the pericaval mass impinging on the IVC and causing mechanical compression. She is still 2 weeks into surgery and is recovering and remains to be seen if lymphedema will improve.   We discussed that her options include chemotherapy (cis+gem or carbo+gem-depending yumiko GFR) or observation. We discussed that based on the findings from the POUT trial she could be a candidate for chemotherapy (on the trial, age of pt ranged upto 88 yrs) given incremental benefit in DFS about of about 20% but this comes at the caveat of side effects. Grade 3 or higher toxicities were reported in 53.2% of the chemotherapy recipients and 13.5% of the surveillance group with neutropenia in 24.3% of those treated with gemcitabine/ cisplatin and 37.3% of those given gemcitabine/carboplatin; nausea in 2.9% and 7.8%, respectively; vomiting in 1.4% and 9.8%; and febrile neutropenia in 5.7% and 7.8%.  We discussed the common and uncommon side -effects of the chemotherapy regimens and provided handouts to the pt. We would like to see pt in 2 weeks with a prior PET/CT ( to make sure she has no metastatic disease) and labs including CBC, CMP. The pt will make the decision to start chemotherapy at that appt.  All of the above was explained to pt and family in lay language and several questions were answered. The pt is in agreement with the plan.    Plan  -RTC in ~3 weeks with prior labs and PET/CT  -Decison on chemotherapy based on labs and PET/CT findings    Staffed with Dr. Toledo.  Torsten Anton  Hem Onc fellow  [phone]

## 2018-11-26 NOTE — PROGRESS NOTES
"ATTENDING ATTESTATION NOTE  I, Jaden Toledo, personally examined and evaluated this patient. I personally reviewed vital signs, medications, labs, imaging and pathology data. I discussed the patient with the fellow, Dr. Anton, and agree with the assessment and plan of care as documented in this consult note from today.   Garcia findings: Ms. Oviedo is a delightful 84yo F with recently diagnosed stage IV, high-risk, high-grade transitional cell carcinoma of right renal pelvis, who is here to establish care.   Ms. Oviedo was found to have gross hematuria in Dec 2017 and was referred to urology for further management. Multiple urine cytologies (1/17/18, 3/8/18, 7/26/18, 9/10/18) have come back positive by FISH for high-grade transitional cell carcinoma since then. She's also had two cystoscopies with bladder biopsies (2/19/18 and 4/3/18) which were negative for bladder tumor. Underwent a bilateral cystoureteroscopy with biopsy and brushing on 9/10/18 - Left upper tract negative and right upper tract cytology suspicious for high-grade urothelial ca on 9/10/18. Right ureter brushing negative from that day.   Based on these findings, she underwent a right robotic nephroureterectomy on 11/13/18.  Pathology showed \"Right nephroureterectomy: Invasive high grade urothelial carcinoma measuring 3.5 cm, located in renal pelvis and invading through renal parenchyma into perinephric fat. Urothelial carcinoma in-situ present. Margins free of tumor. Ana-caval mass: Metastatic urothelial carcinoma involving one lymph node with at least 1 cm tumor deposit. Pericaval Extranodal Extension present. Five additional benign pericaval lymph nodes. Pathologic stage: pT4N1 (1 of 6 lymph nodes).\"  CT A/P without contrast 9/10/18 showed new small bilateral pleural effusions and interstitial pulmonary edema but no evidence of locoregional or metastatic disease.  Assessment and plan: An 84yo lady with newly diagnosed localized stage IV ([id number]) " status post right robotic nephroureterectomy, here for discussion of adjuvant treatment options.   - Patient is independent and living by self. No clear contraindications for cisplatin based therapy but she has a long list of comorbidities that are expected diminish her ability to tolerate adjuvant chemotherapy. She also has multiple chronic cardiac issues but recent ECHO (9/10/18) showed LVEF of 55-60%  - ECOG PS 2-3 primarily because she's only 2 weeks out from the surgery. Given her baseline performance status (pre-surgery) of 0, independent living at home and excellent family support, I expect that her PS will improve in another 2-3 weeks.  - Discussed disease biology and pertinent diagnostic data including surg path report at length with patient, children (son and daughter) and daughter-in-law at length. Explained that given the localized but stage IV (pT4N1) disease, likelihood of recurrence with no further therapy is 50-70%. We don't have a trial for her at this time, but standard-of-care options would include observation versus adjuvant chemotherapy (cisplatin plus gemcitabine or carboplatin plus gemcitabine) as it's shown improvement in recurrence-free survival and likelihood of recurrence in retrospective data and prelim results of the POUT trial presented at [facility] 2018.  - POUT trial was a randomized phase III trial comparing 4 cycles of adjuvant chemotherapy (carboplatin plus gemcitabine or cisplatin plus gemcitabine) versus active surveillance in patients with upper tract urothelial cancers at high risk of disease recurrence.  In this trial, it was shown that adjuvant chemotherapy results decreased the recurrence risk from 50% in the surveillance arm at 2 years to 30% in the chemotherapy arm at 2 years.   - We also explained that this benefit comes at a significant cost of side effects, which would be made worse with her age, chronic comorbidities, and independent living status.   - I reviewed the side  effect of cisplatin/carboplatin and gemcitabine chemotherapy at length including fatigue, nausea, vomiting, diarrhea, myelosuppression, nephropathy, neuropathy, and other side effects including second malignacies etc.  Information printouts were provided.   - After the patient was explained this data, she expressed a strong and unanimous interest in pursuing chemotherapy.  - Will get a restaging PET/CT whole body in 2-3 weeks to establish a post-surgery baseline.  - If this shows no metastatic disease and patient has recovered well, will plan to start chemotherapy in mid Dec (she wants to start before ).   - Consider PORT placement in the coming weeks.     Patient and family given opportunity to ask questions, which were answered to their satisfaction. They are in complete agreement with this plan.    I appreciate Dr. King's referral and will send him this note for review.    BILLIN.    Jaden Toledo M.D.  . Professor of Medicine  Genitourinary Oncology  Division of Hematology, Oncology & Transplantation  [facility]

## 2018-11-26 NOTE — MR AVS SNAPSHOT
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)    -  1       Follow-ups after your visit        Your next 10 appointments already scheduled     Dec 04, 2018  1:30 PM CST   (Arrive by 1:15 PM)   RETURN ENDOCRINE with Dhara Meza MD   [facility] ([facility])    [address]  3rd Floor  [facility] [address] [phone]            Dec 11, 2018 10:30 AM CST   PET ONCOLOGY WHOLE BODY with UUPET1   [facility] [place] PET CT ([facility])    [address] [id number] [phone]           How do I prepare for my exam? (Food and drink instructions) Patients should eat a low carb, high protein meal 7 hours (or the last meal you eat) before the scan. Low carb, high protein meals consist of lean meats, seafood, beans, soy, low-fat dairy, eggs, nuts & seeds.  6 hours before your scan: Stop all food and liquids (except water). Do not chew gum or suck on mints. If you need to take medicine with food, you may take it with a few crackers.  How do I prepare for my exam? (Other instructions) If you have diabetes: Have your exam early in the morning. Your blood glucose will go up as the day goes by. Your glucose level must be 180 or less at the start of the exam. Please take any oral diabetic medication you need to ensure this blood glucose level is below 180, but no insulin 4 hours prior to the exam.  * If you are taking insulin in the morning take with breakfast by 6 am and schedule procedure between 12 and 2:15 pm. * If you are taking insulin at night take nightly dose, fast overnight, schedule procedure  before 10 am. * If you take insulin both morning and night take morning dose by 6am and schedule procedure between 12 and 2:15 pm.  24 hours before your scan: Don t do any heavy exercise. (No jogging, aerobics or other workouts.) Exercise will make your pictures less accurate.  What should I wear? Please wear comfortable clothes.  How long does the exam take?  Most scans will take between 2-3 hours.  What should I bring: Please bring a list of your medicines (including vitamins, minerals and over-the-counter drugs). Leave your valuables at home.  Do I need a :  No  is needed.  What do I need to tell my doctor? Tell your doctor: * If there is any chance you may be pregnant or if you are breastfeeding. * If you have problems lying in small spaces (claustrophobia). If you do, your doctor may give you medicine to help you relax.  What should I do after the exam: No restrictions, You may resume normal activities.  What is this test: Your doctor has ordered a PET scan (positron emission tomography). This will take pictures of the cells and organs in your body. Your doctor may have also ordered a CT scan (computed tomography). This is another way to take pictures of your cells and organs. It is done at the same time as the PET scan. The pictures will help us understand your problem so we can make a treatment plan that fits your needs.  Who should I call with questions: Please call your Imaging Department at your exam site with any questions. Directions, parking instructions, and other information is available on our website, [email or web address].            Dec 12, 2018 10:15 AM CST   [facility] Lab Draw with  JENNIFER LAB DRAW   [facility] ([facility])    [address]  [address]  [facility] [address] [phone]            Dec 12, 2018 10:45 AM CST   (Arrive by 10:30 AM)   Return Visit with Jaden Toledo MD   [facility] ([facility])    [address]  [address]  [facility] [address]   [phone]            Dec 12, 2018 11:30 AM CST   Infusion 360 with UC ONCOLOGY INFUSION, UC 32 ATC   [facility] ([facility])    [address]  [address]  [facility] [address]   [phone]            Chaitanya 10, 2019 12:40 PM CST   (Arrive by 12:25 PM)   Post-Op with Stuart King MD   [facility] ([facility])    [address] [place] [address]  4th Floor  [facility] [address] [phone]              Future tests that were ordered for you today     Open Future Orders        Priority Expected Expires Ordered    PET Oncology Whole Body ASAP 12/10/2018 11/26/2019 11/26/2018            Who to contact     If you have questions or need follow up information about today's clinic visit or your schedule please contact [facility] directly at [phone].  Normal or non-critical lab and imaging results will be communicated to you by Fondeadorahart, letter or phone within 4 business days after the clinic has received the results. If you do not hear from us within 7 days, please contact the clinic through [facility]comni or phone. If you have a critical or abnormal lab result, we will notify you by phone as soon as possible.  Submit refill requests through Acomni or call your pharmacy and they will forward the refill request to us. Please allow 3 business days for your refill to be completed.          Additional Information About Your Visit        Acomni Information     [employer] gives you secure access to your electronic health record. If you see a primary care provider, you can also send messages to your care team and make appointments. If you have questions, please call your primary care clinic.  If you do not have a primary care provider, please call [phone] and they will assist you.        Care  "EveryWhere ID     This is your Care EveryWhere ID. This could be used by other organizations to access your [facility] medical records  [id number]        Your Vitals Were     Pulse Temperature Height Pulse Oximetry BMI (Body Mass Index)       63 97.8  F (36.6  C) (Oral) 1.461 m (4' 9.5\") 99% 29.98 kg/m2        Blood Pressure from Last 3 Encounters:   11/27/18 122/68   11/26/18 126/70   11/15/18 146/56    Weight from Last 3 Encounters:   11/27/18 64 kg (141 lb 3.2 oz)   11/26/18 64 kg (141 lb)   11/13/18 63.7 kg (140 lb 6.9 oz)                 Today's Medication Changes          These changes are accurate as of 11/26/18 11:59 PM.  If you have any questions, ask your nurse or doctor.               These medicines have changed or have updated prescriptions.        Dose/Directions    alendronate 70 MG tablet   Commonly known as:  FOSAMAX   This may have changed:  See the new instructions.   Used for:  High risk medication use, Osteopenia        TAKE 1 TABLET EVERY 7 DAYS AT LEAST 60 MINUTES BEFORE BREAKFAST AS DIRECTED. SEE PACKAGE FOR ADDITIONAL INSTRUCTIONS   Quantity:  12 tablet   Refills:  0       irbesartan 300 MG tablet   Commonly known as:  AVAPRO   This may have changed:    - how much to take  - how to take this  - when to take this  - additional instructions   Used for:  Essential hypertension with goal blood pressure less than 140/90        TAKE 1 TABLET EVERY DAY   Quantity:  90 tablet   Refills:  2       omeprazole 20 MG DR capsule   Commonly known as:  priLOSEC   This may have changed:  when to take this   Used for:  Gastroesophageal reflux disease without esophagitis        Dose:  20 mg   Take 1 capsule (20 mg) by mouth daily   Quantity:  180 capsule   Refills:  3       propranolol 60 MG 24 hr capsule   Commonly known as:  INDERAL LA   This may have changed:  when to take this   Used for:  Nontoxic multinodular goiter        Dose:  60 mg   Take 1 capsule (60 mg) by mouth daily   Quantity:  90 capsule "   Refills:  1                Primary Care Provider Office Phone # Fax #    Pop Antonino Zapien [phone] [address] [place] [address]        Goals        General    Medical (pt-stated)     Notes - Note created  11/16/2018 11:15 AM by Omayra Gatica RN    Goal Statement: I will watch for signs of a urinary tract infection   Measure of Success: free flowing urine output   Supportive Steps to Achieve:   I will seek medical help/call urologist if experiencing signs of burning frequency pain blood in urine or fever  I will follow discharge restrictions   I will keep future Urology appointments  I will take pain medication as needed     Barriers: None   Strengths: Lives with son   Date to Achieve By: to be determined   Patient expressed understanding of goal: Yes          Equal Access to Services     GUNNER RODRIGUEZ : Aleja Pollack, waaxda luqadaha, qaybta kaalmada adeegyada, maldonado seay . So [facility] [place] [phone].    ATENCIÓN: Si habla español, tiene a sierra disposición servicios gratuitos de asistencia lingüística. [facility] [phone].    We comply with applicable federal civil rights laws and [place] laws. We do not discriminate on the basis of race, color, national origin, age, disability, sex, sexual orientation, or gender identity.            Thank you!     Thank you for choosing [facility]  for your care. Our goal is always to provide you with excellent care. Hearing back from our patients is one way we can continue to improve our services. Please take a few minutes to complete the written survey that you may receive in the mail after your visit with us. Thank you!             Your Updated Medication List - Protect others around you: Learn how to safely use, store and throw away your medicines at www.disposemymeds.org.          This list is accurate as of 11/26/18 11:59 PM.  Always use your most recent med list.                    Brand Name Dispense Instructions for use Diagnosis    acetaminophen 650 MG CR tablet    TYLENOL    100 tablet    Take 1 tablet (650 mg) by mouth every 8 hours as needed for pain    Acute post-operative pain       alendronate 70 MG tablet    FOSAMAX    12 tablet    TAKE 1 TABLET EVERY 7 DAYS AT LEAST 60 MINUTES BEFORE BREAKFAST AS DIRECTED. SEE PACKAGE FOR ADDITIONAL INSTRUCTIONS    High risk medication use, Osteopenia       aspirin 81 MG tablet    ASA     Take 81 mg by mouth every evening        CALCIUM + D PO      Take 1 tablet by mouth 2 times daily        colestipol 1 g tablet    COLESTID     Take 1 g by mouth 2 times daily TAKE OTHER MEDS 1 HOUR BEFORE OR 4 HOURS AFTER COLESID        cycloSPORINE 0.05 % ophthalmic emulsion    RESTASIS     Place 1 drop into both eyes 2 times daily        ferrous sulfate 325 (65 Fe) MG EC tablet    FE TABS     Take 325 mg by mouth daily        Fish Oil 500 MG Caps      Take 1 capsule by mouth 2 times daily        furosemide 20 MG tablet    LASIX    90 tablet    Take 1 tablet (20 mg) by mouth every morning    Stasis edema of both lower extremities, (HFpEF) heart failure with preserved ejection fraction (H)       HYDROcodone-acetaminophen 5-325 MG tablet    NORCO    10 tablet    Take 1 tablet by mouth every 6 hours as needed for severe pain        irbesartan 300 MG tablet    AVAPRO    90 tablet    TAKE 1 TABLET EVERY DAY    Essential hypertension with goal blood pressure less than 140/90       lovastatin 40 MG tablet    MEVACOR    90 tablet    TAKE 1 TABLET AT BEDTIME (HYPERLIPIDEMIA LDL GOAL BELOW 130)    Hyperlipidemia LDL goal <130       Melatonin 10 MG Tabs tablet      Take 10 mg by mouth as needed for sleep        methimazole 5 MG tablet    TAPAZOLE    180 tablet    Take 1 tablet (5 mg) by mouth 2 times daily    Nontoxic multinodular goiter       nitroGLYcerin 0.4 MG sublingual tablet    NITROSTAT    25 tablet    For chest pain place 1 tablet under the tongue  every 5 minutes for 3 doses. If symptoms persist 5 minutes after 1st dose call 911.    Elevated troponin       omeprazole 20 MG DR capsule    priLOSEC    180 capsule    Take 1 capsule (20 mg) by mouth daily    Gastroesophageal reflux disease without esophagitis       order for DME     1 each    Equipment being ordered: Knee high compression for B LE 20-30mmHg, Velcro units for night time (consider hybrid sock as foot swelling is less than leg swelling), Donning device    Lymphedema of both lower extremities       oxyCODONE 5 MG tablet    ROXICODONE    15 tablet    Take 1 tablet (5 mg) by mouth every 4 hours as needed for moderate to severe pain    Acute post-operative pain       PROBIOTIC ADVANCED PO      Take 1 capsule by mouth every morning        propranolol 60 MG 24 hr capsule    INDERAL LA    90 capsule    Take 1 capsule (60 mg) by mouth daily    Nontoxic multinodular goiter       rOPINIRole 0.25 MG tablet    REQUIP    180 tablet    Take 1 tablet (0.25 mg) by mouth 2 times daily as needed (restless leg syndrome)    Restless legs syndrome       senna-docusate 8.6-50 MG tablet    SENOKOT-S/PERICOLACE    60 tablet    Take 1 tablet by mouth 2 times daily To prevent constipation    Acute post-operative pain       sertraline 100 MG tablet    ZOLOFT    90 tablet    Take 1 tablet (100 mg) by mouth every morning    THERON (generalized anxiety disorder)       traZODone 50 MG tablet    DESYREL    45 tablet    TAKE 1/2 TABLET(25 MG)  AT BEDTIME    Insomnia, unspecified type

## 2018-11-27 ENCOUNTER — OFFICE VISIT (OUTPATIENT)
Dept: UROLOGY | Facility: CLINIC | Age: 83
End: 2018-11-27
Payer: MEDICARE

## 2018-11-27 VITALS
BODY MASS INDEX: 30.03 KG/M2 | SYSTOLIC BLOOD PRESSURE: 122 MMHG | HEART RATE: 60 BPM | WEIGHT: 141.2 LBS | DIASTOLIC BLOOD PRESSURE: 68 MMHG

## 2018-11-27 DIAGNOSIS — C64.1 UROTHELIAL CARCINOMA OF KIDNEY, RIGHT (H): Primary | ICD-10-CM

## 2018-11-27 NOTE — PROGRESS NOTES
HPI: Ms. Dimple Oviedo is a 85 year old year old female presenting today for evaluation of chief complaint(s): Surgical Followup and RECHECK    Mr. Oviedo has PMH significant for HTN, stress induced cardiomyopathy, Goiter, and urothelial ca.  She is s/p 1) right robot assisted nephroureterectomy with Bladder Cuff as well as2)  Right Para caval and Precaval Lymphadenectomy and 3) biopsy of the Rt Hilar/ Retrocaval Mass with Dr. King on 11/13/18.  She was discharged from an uneventful hospital stay on 11/15/18.  Her Diehl was removed in Clinic on 11/19 (POD#6).   Path has shown:  FINAL DIAGNOSIS:   A. Ana-caval mass, excision:   - Metastatic urothelial carcinoma involving one lymph node     B. Ana-caval lymph nodes, excision:   - Five benign lymph nodes     C. Right kidney and ureter, nephroureterectomy:   - Invasive high grade urothelial carcinoma   - Tumor size: 3.5 cm   - Tumor location: Renal pelvis   - Tumor extent: Invades through renal parenchyma into perinephric fat   - Margins: Free of tumor   - Urothelial carcinoma in-situ present   - Pathologic stage: pT4N1     - She saw Dr. Toledo yesterday who has recommended chemotherapy which will likely start the week of 12/11/18 and will be outpatient at the [facility].  She has an upcoming PET SCAN and blood work prior to beginning therapy.   - Pain improving / minimal  - Incisions closed without drainage, although they do itch.  No rashes  - c/o Early satiety.  Food doesn't taste good. Denies nausea, emesis.  Heartburn is better (rather than worse)  - Normal BMs. Hasn't needed to take stool softeners lately  - No fevers, chills.   - hematuria has resolved.  Voiding easily.  Emptying.   No incontinence.   - Patient has been staying at her son's house but wants to go home (where she lives alone).  Is concerned that she will not be able to pull up her compression hose (because she will need to strain to do this).  Wonders if a home health RN can come every day.      Presents with her daughter from [place]    Current Outpatient Prescriptions   Medication Sig Dispense Refill     acetaminophen (TYLENOL) 650 MG CR tablet Take 1 tablet (650 mg) by mouth every 8 hours as needed for pain 100 tablet 0     alendronate (FOSAMAX) 70 MG tablet TAKE 1 TABLET EVERY 7 DAYS AT LEAST 60 MINUTES BEFORE BREAKFAST AS DIRECTED. SEE PACKAGE FOR ADDITIONAL INSTRUCTIONS (Patient taking differently: TAKE 1 TABLET EVERY 7 DAYS AT LEAST 60 MINUTES BEFORE BREAKFAST AS DIRECTED. (Tuesdays0) 12 tablet 0     aspirin 81 MG tablet Take 81 mg by mouth every evening        Calcium Citrate-Vitamin D (CALCIUM + D PO) Take 1 tablet by mouth 2 times daily        colestipol (COLESTID) 1 g tablet Take 1 g by mouth 2 times daily TAKE OTHER MEDS 1 HOUR BEFORE OR 4 HOURS AFTER COLESID       cycloSPORINE (RESTASIS) 0.05 % ophthalmic emulsion Place 1 drop into both eyes 2 times daily       ferrous sulfate 325 (65 Fe) MG TBEC EC tablet Take 325 mg by mouth daily       furosemide (LASIX) 20 MG tablet Take 1 tablet (20 mg) by mouth every morning 90 tablet 1     HYDROcodone-acetaminophen (NORCO) 5-325 MG per tablet Take 1 tablet by mouth every 6 hours as needed for severe pain (Patient not taking: Reported on 10/31/2018) 10 tablet 0     irbesartan (AVAPRO) 300 MG tablet TAKE 1 TABLET EVERY DAY (Patient taking differently: Take 300 mg by mouth every morning ) 90 tablet 2     lovastatin (MEVACOR) 40 MG tablet TAKE 1 TABLET AT BEDTIME (HYPERLIPIDEMIA LDL GOAL BELOW 130) 90 tablet 2     Melatonin 10 MG TABS tablet Take 10 mg by mouth as needed for sleep        methimazole (TAPAZOLE) 5 MG tablet Take 1 tablet (5 mg) by mouth 2 times daily 180 tablet 3     nitroGLYcerin (NITROSTAT) 0.4 MG sublingual tablet For chest pain place 1 tablet under the tongue every 5 minutes for 3 doses. If symptoms persist 5 minutes after 1st dose call 911. (Patient not taking: Reported on 10/31/2018) 25 tablet 3     Omega-3 Fatty Acids (FISH  OIL) 500 MG CAPS Take 1 capsule by mouth 2 times daily        omeprazole (PRILOSEC) 20 MG CR capsule Take 1 capsule (20 mg) by mouth daily (Patient taking differently: Take 20 mg by mouth every morning ) 180 capsule 3     order for DME Equipment being ordered: Knee high compression for B LE 20-30mmHg, Velcro units for night time (consider hybrid sock as foot swelling is less than leg swelling), Donning device 1 each 2     oxyCODONE IR (ROXICODONE) 5 MG tablet Take 1 tablet (5 mg) by mouth every 4 hours as needed for moderate to severe pain 15 tablet 0     Probiotic Product (PROBIOTIC ADVANCED PO) Take 1 capsule by mouth every morning        propranolol (INDERAL LA) 60 MG 24 hr capsule Take 1 capsule (60 mg) by mouth daily (Patient taking differently: Take 60 mg by mouth every morning ) 90 capsule 1     rOPINIRole (REQUIP) 0.25 MG tablet Take 1 tablet (0.25 mg) by mouth 2 times daily as needed (restless leg syndrome) 180 tablet 1     senna-docusate (SENOKOT-S;PERICOLACE) 8.6-50 MG per tablet Take 1 tablet by mouth 2 times daily To prevent constipation 60 tablet 0     sertraline (ZOLOFT) 100 MG tablet Take 1 tablet (100 mg) by mouth every morning 90 tablet 1     traZODone (DESYREL) 50 MG tablet TAKE 1/2 TABLET(25 MG)  AT BEDTIME 45 tablet 1       ALLERGIES: Codeine      REVIEW OF SYSTEMS:  As above in HPI    GENERAL PHYSICAL EXAM:   Vitals: /68  Pulse 60  Wt 64 kg (141 lb 3.2 oz)  BMI 30.03 kg/m2  Body mass index is 30.03 kg/(m^2).    GENERAL: Well groomed, well developed, well nourished female in NAD.  GI: Soft, NT, ND, no palpable masses.    Midline incision and lap sites CDI with suture/glue intact - no hernias, masses or tenderness  MS: Normal muscle tone. Ambulates with a walker  SKIN: Warm to touch, dry.  No visible rashes or lesions on examined areas.  HEMATOLOGIC/LYMPHATIC/IMMUNOLOGIC: + 2 LE edema - wearing compression socks.  NEURO: Alert and oriented x 3.  PSYCH: Normal mood and affect, pleasant  and agreeable during interview and exam.     PVR: Residual urine by ultrasound was Zero ml.      LABS: The last test results for MsRobert Oviedo were reviewed.   BMP -   Recent Labs   Lab Test  11/15/18   0758  11/14/18   0632  11/13/18   1346   01/16/18   1247  01/16/18   0646   12/13/17   2236   04/18/16   0905   11/02/15   0912   NA  135  136  139   < >   --   133   < >  139   < >  142   < >  139   POTASSIUM  4.7  4.7  4.0   < >  5.3  4.9   < >  3.8   < >  4.2   < >  3.8   CHLORIDE  104  104  105   < >   --   99   < >  103   < >  108   < >  107   CO2  28  27  27   < >   --   23   < >  27   < >  28   < >  24   BUN  13  17  20   < >   --   21   < >  24   < >  15   < >  8   CR  1.05*  1.00  0.85   < >   --   0.76   < >  0.77   < >  0.73   < >  0.66   GLC  94  98  127*   < >   --   117*   < >  226*   < >  111*   < >  148*   SHREYA  8.2*  8.2*  8.5   < >   --   8.8   < >  8.3*   < >  9.0   < >  8.7   MAG   --    --    --    --   2.1  2.0   --   2.0   --    --    --    --    PHOS   --    --    --    --    --    --    --   2.4*   --   3.2   --   3.2    < > = values in this interval not displayed.       CBC -   Recent Labs   Lab Test  11/15/18   0758  11/14/18   0632  11/13/18   1346   WBC  10.0  11.3*  13.1*   HGB  10.1*  10.0*  11.2*   PLT  230  207  227       ASSESSMENT:   1) Metastatic urothelial ca POD#13 s/p right robot NU with Dr. King    PLAN:   - Abdominal precautions (no lifting, straining) for another 3 weeks.   - Take care with putting on your socks - try not to strain  - Dr. Christine Qureshi's nurse, will have a  contact you to explore resources during your time of illness.   - Continue small, frequent, nutritious meals to keep you strong.  It may take awhile for your appetite to return to normal  - Agree with following up with Dr. Toledo for labs and possible chemotherapy.   - Return to Clinic to see Dr. King in 3-4 weeks.  We will cancel your appointment for 12/6.     Quyen Fregoso,  ROSANNE  Department of Urologic Surgery

## 2018-11-27 NOTE — PATIENT INSTRUCTIONS
- Abdominal precautions (no lifting, straining) for another 3 weeks.   - Take care with putting on your socks - try not to strain  - Dr. Christine Qureshi's nurse, will have a  contact you to explore resources during your time of illness.   - Continue small, frequent, nutritious meals to keep you strong.  It may take awhile for your appetite to return to normal  - Agree with following up with Dr. Toledo for labs and possible chemotherapy.   - Return to Clinic to see Dr. King in 3-4 weeks.  We will cancel your appointment for 12/6.     [facility]

## 2018-11-27 NOTE — MR AVS SNAPSHOT
After Visit Summary   11/27/2018    Dimple Oviedo    MRN: [id number]           Patient Information     Date Of Birth          6/23/1933        Visit Information        Provider Department      11/27/2018 11:30 AM Geovanna Fregoso PA [facility] Urology and [facility] for Prostate and Urologic Cancers        Care Instructions    - Abdominal precautions (no lifting, straining) for another 3 weeks.   - Take care with putting on your socks - try not to strain  - Dr. Christine Qureshi's nurse, will have a  contact you to explore resources during your time of illness.   - Continue small, frequent, nutritious meals to keep you strong.  It may take awhile for your appetite to return to normal  - Agree with following up with Dr. Toledo for labs and possible chemotherapy.   - Return to Clinic to see Dr. King in 3-4 weeks.  We will cancel your appointment for 12/6.     [facility]          Follow-ups after your visit        Your next 10 appointments already scheduled     Dec 04, 2018  1:30 PM CST   (Arrive by 1:15 PM)   RETURN ENDOCRINE with Dhara Meza MD   [facility] Endocrinology ([facility])    [address] [place] [id number] [phone]            Dec 06, 2018 11:00 AM CST   (Arrive by 10:45 AM)   Post-Op with Stuart King MD   [facility] Urology and [facility]state [facility] Urologic C[facility] ([facility])    [address] [place] [id number] [phone]            Dec 11, 2018 10:30 AM CST   PET ONCOLOGY WHOLE BODY with UUPET1   [facility] PET CT ([facility])    [address] [facility] [address] [phone]           How do I prepare for my exam? (Food and drink instructions) Patients should eat a low carb, high protein meal 7 hours (or the last meal you eat) before the scan. Low carb, high protein meals  consist of lean meats, seafood, beans, soy, low-fat dairy, eggs, nuts & seeds.  6 hours before your scan: Stop all food and liquids (except water). Do not chew gum or suck on mints. If you need to take medicine with food, you may take it with a few crackers.  How do I prepare for my exam? (Other instructions) If you have diabetes: Have your exam early in the morning. Your blood glucose will go up as the day goes by. Your glucose level must be 180 or less at the start of the exam. Please take any oral diabetic medication you need to ensure this blood glucose level is below 180, but no insulin 4 hours prior to the exam.  * If you are taking insulin in the morning take with breakfast by 6 am and schedule procedure between 12 and 2:15 pm. * If you are taking insulin at night take nightly dose, fast overnight, schedule procedure before 10 am. * If you take insulin both morning and night take morning dose by 6am and schedule procedure between 12 and 2:15 pm.  24 hours before your scan: Don t do any heavy exercise. (No jogging, aerobics or other workouts.) Exercise will make your pictures less accurate.  What should I wear? Please wear comfortable clothes.  How long does the exam take?  Most scans will take between 2-3 hours.  What should I bring: Please bring a list of your medicines (including vitamins, minerals and over-the-counter drugs). Leave your valuables at home.  Do I need a :  No  is needed.  What do I need to tell my doctor? Tell your doctor: * If there is any chance you may be pregnant or if you are breastfeeding. * If you have problems lying in small spaces (claustrophobia). If you do, your doctor may give you medicine to help you relax.  What should I do after the exam: No restrictions, You may resume normal activities.  What is this test: Your doctor has ordered a PET scan (positron emission tomography). This will take pictures of the cells and organs in your body. Your doctor may have also  ordered a CT scan (computed tomography). This is another way to take pictures of your cells and organs. It is done at the same time as the PET scan. The pictures will help us understand your problem so we can make a treatment plan that fits your needs.  Who should I call with questions: Please call your Imaging Department at your exam site with any questions. Directions, parking instructions, and other information is available on our website, [email or web address].            Dec 12, 2018 10:15 AM CST   [facility] Lab Draw with [facility] LAB DRAW   [facility] Draw ([facility])    [address] [place] [address]  [address]  [facility] [id number] [phone]            Dec 12, 2018 10:45 AM CST   (Arrive by 10:30 AM)   Return Visit with Jaedn Toledo MD   [facility] ([facility])    [address] [place] [address]  [address]  [facility] [id number] [phone]            Dec 12, 2018 11:30 AM CST   Infusion 360 with  ONCOLOGY INFUSION, UC 32 ATC   [facility] ([facility])    [address] [place] [address]  [address]  [facility] [id number] [phone]              Future tests that were ordered for you today     Open Future Orders        Priority Expected Expires Ordered    PET Oncology Whole Body ASAP 12/10/2018 11/26/2019 11/26/2018            Who to contact     Please call your clinic at [phone] to:    Ask questions about your health    Make or cancel appointments    Discuss your medicines    Learn about your test results    Speak to your doctor            Additional Information About Your Visit        M[employer] Information     [employer] gives you secure access to your electronic health record. If you see a primary care provider, you can also send messages to your care team and make appointments. If you have questions, please call your primary care clinic.  If you do not have a primary care provider, please  call [phone] and they will assist you.      Sc[employer]Live is an electronic gateway that provides easy, online access to your medical records. With [employer]rib[employer]Live, you can request a clinic appointment, read your test results, renew a prescription or communicate with your care team.     To access your existing account, please contact your [facility] or call [phone] for assistance.        Care EveryWhere ID     This is your Care EveryWhere ID. This could be used by other organizations to access your [facility] medical records  [id number]        Your Vitals Were     Pulse BMI (Body Mass Index)                60 30.03 kg/m2           Blood Pressure from Last 3 Encounters:   11/27/18 122/68   11/26/18 126/70   11/15/18 146/56    Weight from Last 3 Encounters:   11/27/18 64 kg (141 lb 3.2 oz)   11/26/18 64 kg (141 lb)   11/13/18 63.7 kg (140 lb 6.9 oz)              Today, you had the following     No orders found for display         Today's Medication Changes          These changes are accurate as of 11/27/18 11:58 AM.  If you have any questions, ask your nurse or doctor.               These medicines have changed or have updated prescriptions.        Dose/Directions    alendronate 70 MG tablet   Commonly known as:  FOSAMAX   This may have changed:  See the new instructions.   Used for:  High risk medication use, Osteopenia        TAKE 1 TABLET EVERY 7 DAYS AT LEAST 60 MINUTES BEFORE BREAKFAST AS DIRECTED. SEE PACKAGE FOR ADDITIONAL INSTRUCTIONS   Quantity:  12 tablet   Refills:  0       irbesartan 300 MG tablet   Commonly known as:  AVAPRO   This may have changed:    - how much to take  - how to take this  - when to take this  - additional instructions   Used for:  Essential hypertension with goal blood pressure less than 140/90        TAKE 1 TABLET EVERY DAY   Quantity:  90 tablet   Refills:  2       omeprazole 20 MG DR capsule   Commonly known as:  priLOSEC   This may have changed:  when to  take this   Used for:  Gastroesophageal reflux disease without esophagitis        Dose:  20 mg   Take 1 capsule (20 mg) by mouth daily   Quantity:  180 capsule   Refills:  3       propranolol 60 MG 24 hr capsule   Commonly known as:  INDERAL LA   This may have changed:  when to take this   Used for:  Nontoxic multinodular goiter        Dose:  60 mg   Take 1 capsule (60 mg) by mouth daily   Quantity:  90 capsule   Refills:  1                Primary Care Provider Office Phone # Fax #    Pop Antonino Zapien [phone] [address] [place] [address]        Goals        General    Medical (pt-stated)     Notes - Note created  11/16/2018 11:15 AM by Omayra Gatica RN    Goal Statement: I will watch for signs of a urinary tract infection   Measure of Success: free flowing urine output   Supportive Steps to Achieve:   I will seek medical help/call urologist if experiencing signs of burning frequency pain blood in urine or fever  I will follow discharge restrictions   I will keep future Urology appointments  I will take pain medication as needed     Barriers: None   Strengths: Lives with son   Date to Achieve By: to be determined   Patient expressed understanding of goal: Yes          Equal Access to Services     [facility]: Hadkiana Pollack, wasyed lubabatunde, qaybta kaalchase chávez, maldonado bey. So [facility] [phone].    ATENCIÓN: Si habla español, tiene a sierra disposición servicios gratromeos de asistencia lingüística. Llame al [phone].    We comply with applicable federal civil rights laws and [place] laws. We do not discriminate on the basis of race, color, national origin, age, disability, sex, sexual orientation, or gender identity.            Thank you!     Thank you for choosing [facility] UROLOGY AND [facility] FOR PROSTATE AND UROLOGIC CANCERS  for your care. Our goal is always to provide you with excellent care. Hearing back from our  patients is one way we can continue to improve our services. Please take a few minutes to complete the written survey that you may receive in the mail after your visit with us. Thank you!             Your Updated Medication List - Protect others around you: Learn how to safely use, store and throw away your medicines at www.disposemymeds.org.          This list is accurate as of 11/27/18 11:58 AM.  Always use your most recent med list.                   Brand Name Dispense Instructions for use Diagnosis    acetaminophen 650 MG CR tablet    TYLENOL    100 tablet    Take 1 tablet (650 mg) by mouth every 8 hours as needed for pain    Acute post-operative pain       alendronate 70 MG tablet    FOSAMAX    12 tablet    TAKE 1 TABLET EVERY 7 DAYS AT LEAST 60 MINUTES BEFORE BREAKFAST AS DIRECTED. SEE PACKAGE FOR ADDITIONAL INSTRUCTIONS    High risk medication use, Osteopenia       aspirin 81 MG tablet    ASA     Take 81 mg by mouth every evening        CALCIUM + D PO      Take 1 tablet by mouth 2 times daily        colestipol 1 g tablet    COLESTID     Take 1 g by mouth 2 times daily TAKE OTHER MEDS 1 HOUR BEFORE OR 4 HOURS AFTER COLESID        cycloSPORINE 0.05 % ophthalmic emulsion    RESTASIS     Place 1 drop into both eyes 2 times daily        ferrous sulfate 325 (65 Fe) MG EC tablet    FE TABS     Take 325 mg by mouth daily        Fish Oil 500 MG Caps      Take 1 capsule by mouth 2 times daily        furosemide 20 MG tablet    LASIX    90 tablet    Take 1 tablet (20 mg) by mouth every morning    Stasis edema of both lower extremities, (HFpEF) heart failure with preserved ejection fraction (H)       HYDROcodone-acetaminophen 5-325 MG tablet    NORCO    10 tablet    Take 1 tablet by mouth every 6 hours as needed for severe pain        irbesartan 300 MG tablet    AVAPRO    90 tablet    TAKE 1 TABLET EVERY DAY    Essential hypertension with goal blood pressure less than 140/90       lovastatin 40 MG tablet    MEVACOR    90  tablet    TAKE 1 TABLET AT BEDTIME (HYPERLIPIDEMIA LDL GOAL BELOW 130)    Hyperlipidemia LDL goal <130       Melatonin 10 MG Tabs tablet      Take 10 mg by mouth as needed for sleep        methimazole 5 MG tablet    TAPAZOLE    180 tablet    Take 1 tablet (5 mg) by mouth 2 times daily    Nontoxic multinodular goiter       nitroGLYcerin 0.4 MG sublingual tablet    NITROSTAT    25 tablet    For chest pain place 1 tablet under the tongue every 5 minutes for 3 doses. If symptoms persist 5 minutes after 1st dose call 911.    Elevated troponin       omeprazole 20 MG DR capsule    priLOSEC    180 capsule    Take 1 capsule (20 mg) by mouth daily    Gastroesophageal reflux disease without esophagitis       order for DME     1 each    Equipment being ordered: Knee high compression for B LE 20-30mmHg, Velcro units for night time (consider hybrid sock as foot swelling is less than leg swelling), Donning device    Lymphedema of both lower extremities       oxyCODONE 5 MG tablet    ROXICODONE    15 tablet    Take 1 tablet (5 mg) by mouth every 4 hours as needed for moderate to severe pain    Acute post-operative pain       PROBIOTIC ADVANCED PO      Take 1 capsule by mouth every morning        propranolol 60 MG 24 hr capsule    INDERAL LA    90 capsule    Take 1 capsule (60 mg) by mouth daily    Nontoxic multinodular goiter       rOPINIRole 0.25 MG tablet    REQUIP    180 tablet    Take 1 tablet (0.25 mg) by mouth 2 times daily as needed (restless leg syndrome)    Restless legs syndrome       senna-docusate 8.6-50 MG tablet    SENOKOT-S/PERICOLACE    60 tablet    Take 1 tablet by mouth 2 times daily To prevent constipation    Acute post-operative pain       sertraline 100 MG tablet    ZOLOFT    90 tablet    Take 1 tablet (100 mg) by mouth every morning    THERON (generalized anxiety disorder)       traZODone 50 MG tablet    DESYREL    45 tablet    TAKE 1/2 TABLET(25 MG)  AT BEDTIME    Insomnia, unspecified type

## 2018-11-28 ENCOUNTER — TELEPHONE (OUTPATIENT)
Dept: ONCOLOGY | Facility: CLINIC | Age: 83
End: 2018-11-28

## 2018-11-29 ENCOUNTER — PATIENT OUTREACH (OUTPATIENT)
Dept: CARE COORDINATION | Facility: CLINIC | Age: 83
End: 2018-11-29

## 2018-11-29 DIAGNOSIS — C66.1 UROTHELIAL CARCINOMA OF RIGHT DISTAL URETER (H): Primary | ICD-10-CM

## 2018-11-29 NOTE — TELEPHONE ENCOUNTER
"Social Work Note: Telephone Call  Oncology Clinic    Data/Intervention:  Patient Name:  Dimple Oviedo  /Age:  1933 (85 year old)    Call From: Social work contacted patient by phone.  Reason for Call:  SW received referral from RN indicating patient lives alone and may need further services or assistance at home following her surgery and in anticipation of starting chemotherapy.    Assessment:  Social work spoke with patient and her daughter over the phone on speakerphone.  Patient recently had a nephrectomy for which she stayed with her son in [place], [place] in order to have assistance with care needs and recovery following the procedure.  Patient stated she has since returned to her own home in [place] where she lives in a \"co-op where people are around all the time but we don't really help each other physically.\" Patient stated she is independent with ADLs/IADLs in the home and only has a  who comes about once a month \"only to clean.\" Patient voiced to this worker feeling that she does not need additional assistance and stating her friends would be able to help if needed.  Daughter joined conversation and indicated that patient's friends \"are all 70+ who really can't help out.\" Daughter also stated concerns as she is currently staying with patient and was needed to help with transportation for patient to be able to get groceries.  Daughter indicated she will be leaving the local area tomorrow to return to her out of state home and stated her brother could assist as needed but also lives far away.  Daughter was concerned about patient being able to manage her lymphedema equipment and getting groceries on her own while recovering from surgery.  Daughter also expressed concerns about what would happen if patient is weakened by her cancer treatments and requiring more assistance in the home.  SW explained Medicare benefits for home health care and gave education about skilled vs unskilled needs.  " Daughter educated that patient's Medicare would cover services for skilled needs (home RN, PT/OT) and also gave information about Medicare home bound requirement.  SW also discussed possibility of private duty home care services and gave general information about types of services available, general estimate or rates, etc. SW also discussed Senior Linkage Line and suggested patient and daughter call regarding different community services and needs.  SW gave education about [employer] meal delivery and completed referral.  SW also gave education about financial grants available to assist which family did not want to apply for at this time.  SW also provided education about [employer] Road to Recovery volunteer  program and provided ACS navigator phone number to assist with set up.  Patient and daughter indicated appreciation for social work intervention and no other needs reported at this time.    Plan:  SW contact information provided for any other concerns.  SW available to assist with any other identified needs. Addressed patient's distress through today. No other concerns at this time.  SW available as necessary.     Soo Yeon Han, MSW, [facility] Center[facility]  Pager: [phone]  Phone: [phone]

## 2018-11-29 NOTE — PROGRESS NOTES
Clinic Care Coordination Contact  [facility]/Voicemail       Clinical Data: Care Coordinator Outreach    [place] Utilization: [facility]   11/13-11/15/2018--       Admission Diagnoses:   Metastatic Urothelial Cancer         Past Medical History            Past Medical History:   Diagnosis Date     Calculus of kidney        Esophageal reflux        GERD (gastroesophageal reflux disease)        Hyperlipidemia LDL goal <130 5/9/2010     Malignant melanoma of skin of trunk, except scrotum (H)        Nonspecific abnormal finding         has living will 2004 -      Nontoxic multinodular goiter         no further eval /tx rec per pt     Osteopenia        Other psoriasis        Personal history of colonic polyps        PMR (polymyalgia rheumatica) (H)        Stress-induced cardiomyopathy        Undiagnosed cardiac murmurs        Unspecified constipation        Unspecified essential hypertension        Urothelial carcinoma (H)           Outreach attempted x 1.  Left message on voicemail with call back information and requested return call.    Plan:. Care Coordinator will try to reach patient again in 3-5 business days.    Omayra Gatica RN / Clinical Care Coordinator     [facility]   [email or web address]  Office :  [phone] / Fax :  [phone]

## 2018-12-04 ENCOUNTER — OFFICE VISIT (OUTPATIENT)
Dept: ENDOCRINOLOGY | Facility: CLINIC | Age: 83
End: 2018-12-04
Payer: MEDICARE

## 2018-12-04 VITALS
WEIGHT: 141.4 LBS | DIASTOLIC BLOOD PRESSURE: 74 MMHG | BODY MASS INDEX: 30.51 KG/M2 | HEIGHT: 57 IN | HEART RATE: 54 BPM | SYSTOLIC BLOOD PRESSURE: 138 MMHG

## 2018-12-04 DIAGNOSIS — E05.00 GRAVES DISEASE: ICD-10-CM

## 2018-12-04 LAB
T3 SERPL-MCNC: 112 NG/DL (ref 60–181)
T4 FREE SERPL-MCNC: 1.05 NG/DL (ref 0.76–1.46)
TSH SERPL DL<=0.005 MIU/L-ACNC: <0.01 MU/L (ref 0.4–4)

## 2018-12-04 PROCEDURE — 84480 ASSAY TRIIODOTHYRONINE (T3): CPT | Performed by: INTERNAL MEDICINE

## 2018-12-04 ASSESSMENT — PAIN SCALES - GENERAL: PAINLEVEL: NO PAIN (0)

## 2018-12-04 NOTE — PROGRESS NOTES
Clinic Care Coordination Contact    Clinic Care Coordination Contact  OUTREACH    Referral Information: CTS        Primary Diagnosis: Genitourinary Disorders    Chief Complaint   Patient presents with     Clinic Care Coordination - Follow-up     Clinic Care Coordination RN         [place] Utilization: [facility]   11/13-11/15/2018--       Admission Diagnoses:   Metastatic Urothelial Cancer          Past Medical History               Past Medical History:   Diagnosis Date     Calculus of kidney        Esophageal reflux        GERD (gastroesophageal reflux disease)        Hyperlipidemia LDL goal <130 5/9/2010     Malignant melanoma of skin of trunk, except scrotum (H)        Nonspecific abnormal finding         has living will 2004 -      Nontoxic multinodular goiter         no further eval /tx rec per pt     Osteopenia        Other psoriasis        Personal history of colonic polyps        PMR (polymyalgia rheumatica) (H)        Stress-induced cardiomyopathy        Undiagnosed cardiac murmurs        Unspecified constipation        Unspecified essential hypertension        Urothelial carcinoma (H)              Clinic Utilization  Difficulty keeping appointments:: No  Compliance Concerns: No  No-Show Concerns: No  No PCP office visit in Past Year: No  Utilization    Last refreshed: 11/30/2018  6:32 PM:  No Show Count (past year) 0       Last refreshed: 11/30/2018  6:32 PM:  ED visits 2       Last refreshed: 11/30/2018  6:32 PM:  Hospital admissions 5          Current as of: 11/30/2018  6:32 PM             Clinical Concerns:  Current Medical Concerns:  Patient reports she will be starting chemotherapy treatments.  Patient plans to go back to her sons during her Chemotherapy treatments.  Patient talked to the Oncology SW and was given transportation and meals resources.  Patient lives alone in a 200 person complex and will stay home in between chemotherapy  Patient is aware she can have future home  care services   Pain  Pain (GOAL):: No  Health Maintenance Reviewed: Due/Overdue  Clinical Pathway: None    Medication Management:  Not discussed      Functional Status:  Uses a walker        Living Situation:  Lives alone in an apartment        Diet/Exercise/Sleep:  Food Insecurity: No  Tube Feeding: No  Exercise:: Currently not exercising  Inadequate activity/exercise (GOAL):: No  Significant changes in sleep pattern (GOAL): No    Transportation:  Transportation concerns (GOAL):: No  Transportation means:: Family     Psychosocial:  Mental health DX:: Yes  Mental health DX how managed:: Medication  Mental health management concern (GOAL):: No        Goals:   Goals        General    Medical (pt-stated)     Notes - Note created  11/16/2018 11:15 AM by Omayra Gatica RN    Goal Statement: I will watch for signs of a urinary tract infection   Measure of Success: free flowing urine output   Supportive Steps to Achieve:   I will seek medical help/call urologist if experiencing signs of burning frequency pain blood in urine or fever  I will follow discharge restrictions   I will keep future Urology appointments  I will take pain medication as needed     Barriers: None   Strengths: Lives with son   Date to Achieve By: to be determined   Patient expressed understanding of goal: Yes                   Future Appointments              Today Dhara Meza MD [facility] Endocrinology, [facility]    In 1 week UUPET1 [facility] O    In 1 week [facility] LAB DRAW [facility] Lab Draw, [facility]    In 1 week Jaden Toledo MD [facility]    In 1 week Tonie Bhardwaj RD [facility]    In 1 week  32 ATC;  ONCOLOGY INFUSION [facility]    In 1 month Stuart King MD [facility] Urology and Inst for Prostate and Urologic Cancers, [facility]          Plan: Patient requests CC call every 2 weeks for an update      Omayra Gatica RN / Clinical Care Coordinator     [facility] [place]   [email or web address]  Office :  [phone] / Fax :  [phone]

## 2018-12-04 NOTE — PROGRESS NOTES
This 85 year old woman is here for f/u of Graves.  she first developed symptoms of hyperthyroidism in December 2017.  She was most troubled by the tremor that occurred.  She had not noticed change in her skin or hair.  She saw Dr. Rojas in the community and he started her on methimazole.  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  Because she had received an iodine dye load when she had a coronary angiogram, we were unable to do a radioiodine scan and uptake to determine the precise cause of her hyperthyroidism.  She was maintained on methimazole until June 2017 when it was stopped.  A radioiodine uptake and scan was done in July, showed an uptake of ~ 70%, and confirmed she had Graves' disease.  She was restarted on methimazole in July and we have been adjusting the dose since then.  She has now been on 15 mg alternating with 10 mg for the last month.  On this dose she feels much improved.  Her tremor and lightheadedness are gone.  She has an OK appetite and hasn't changed weight.  She is both hot and cold.  Her hair is coming back.  She doesn't think she has proptosis.      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 She will have a PET CT soon for staging.      Patient Active Problem List   Diagnosis     Esophageal reflux     Restless leg syndrome     heat intolerance     Goiter     Disorder of bone and cartilage     Other psoriasis     perirectal cyst     Malignant melanoma of skin of trunk, except scrotum (H)     Nontoxic multinodular goiter     Hyperlipidemia LDL goal <130     Hypertension goal BP (blood pressure) < 140/90     Urinary incontinence     Osteoarthritis of left knee     Hip arthritis     Polymyalgia rheumatica (H)     High risk medication use     Shoulder pain     Impaired fasting blood sugar     Chronic bilateral low back pain  without sciatica     Obesity, unspecified obesity severity, unspecified obesity type     Iron deficiency anemia, unspecified iron deficiency anemia type     NSTEMI (non-ST elevated myocardial infarction) (H)     Stress-induced cardiomyopathy     A-fib (H)     Anxiety     Chronic systolic heart failure (H)     Urothelial carcinoma (H)     Hyperthyroidism     Restless legs syndrome     CRESENCIO (acute kidney injury) (H)     Hydronephrosis     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 SEE PACKAGE FOR ADDITIONAL INSTRUCTIONS (Patient taking differently: TAKE 1 TABLET EVERY 7 DAYS AT LEAST 60 MINUTES BEFORE BREAKFAST AS DIRECTED. (Tuesdays0)   aspirin 81 MG tablet Take 81 mg by mouth every evening    Calcium Citrate-Vitamin D (CALCIUM + D PO) Take 1 tablet by mouth 2 times daily    colestipol (COLESTID) 1 g tablet Take 1 g by mouth 2 times daily TAKE OTHER MEDS 1 HOUR BEFORE OR 4 HOURS AFTER COLESID   cycloSPORINE (RESTASIS) 0.05 % ophthalmic emulsion Place 1 drop into both eyes 2 times daily   ferrous sulfate 325 (65 Fe) MG TBEC EC tablet Take 325 mg by mouth daily   furosemide (LASIX) 20 MG tablet Take 1 tablet (20 mg) by mouth every morning   irbesartan (AVAPRO) 300 MG tablet TAKE 1 TABLET EVERY DAY (Patient taking differently: Take 300 mg by mouth every morning )   lovastatin (MEVACOR) 40 MG tablet TAKE 1 TABLET AT BEDTIME (HYPERLIPIDEMIA LDL GOAL BELOW 130)   Melatonin 10 MG TABS tablet Take 10 mg by mouth as needed for sleep    methimazole (TAPAZOLE) 5 MG tablet Take 1 tablet (5 mg) by mouth 2 times daily   nitroGLYcerin (NITROSTAT) 0.4 MG sublingual tablet For chest pain place 1 tablet under the tongue every 5  "minutes for 3 doses. If symptoms persist 5 minutes after 1st dose call 911.   Omega-3 Fatty Acids (FISH OIL) 500 MG CAPS Take 1 capsule by mouth 2 times daily    omeprazole (PRILOSEC) 20 MG CR capsule Take 1 capsule (20 mg) by mouth daily (Patient taking differently: Take 20 mg by mouth every morning )   order for DME Equipment being ordered: Knee high compression for B LE 20-30mmHg, Velcro units for night time (consider hybrid sock as foot swelling is less than leg swelling), Donning device   oxyCODONE IR (ROXICODONE) 5 MG tablet Take 1 tablet (5 mg) by mouth every 4 hours as needed for moderate to severe pain   Probiotic Product (PROBIOTIC ADVANCED PO) Take 1 capsule by mouth every morning    propranolol (INDERAL LA) 60 MG 24 hr capsule Take 1 capsule (60 mg) by mouth daily (Patient taking differently: Take 60 mg by mouth every morning )   rOPINIRole (REQUIP) 0.25 MG tablet Take 1 tablet (0.25 mg) by mouth 2 times daily as needed (restless leg syndrome)   senna-docusate (SENOKOT-S;PERICOLACE) 8.6-50 MG per tablet Take 1 tablet by mouth 2 times daily To prevent constipation   sertraline (ZOLOFT) 100 MG tablet Take 1 tablet (100 mg) by mouth every morning   traZODone (DESYREL) 50 MG tablet TAKE 1/2 TABLET(25 MG)  AT BEDTIME   HYDROcodone-acetaminophen (NORCO) 5-325 MG per tablet Take 1 tablet by mouth every 6 hours as needed for severe pain (Patient not taking: Reported on 12/4/2018)     No current facility-administered medications on file prior to visit.     ROS: 10 point ROS neg other than the symptoms noted above in the HPI.    So Hx - lives independently    Vital signs:   /74  Pulse 54  Ht 1.448 m (4' 9\")  Wt 64.1 kg (141 lb 6.4 oz)  BMI 30.6 kg/m2  Estimated body mass index is 30.6 kg/(m^2) as calculated from the following:    Height as of this encounter: 1.448 m (4' 9\").    Weight as of this encounter: 64.1 kg (141 lb 6.4 oz).  NAD  Eyes - no periorbital edema, conjunctival injection, scleral " icterus, lid lag, proptosis  Neck - thyroid is just palpable and w/o nodules  Lungs - clear  CV - RRR.    Neuro -   DTR 2/4 biceps, no tremor  Skin - normal texture     ENDO THYROID LABS-[facility] Latest Ref Rng & Units 12/4/2018 11/26/2018   TSH 0.40 - 4.00 mU/L <0.01 (L) <0.01 (L)   T4 FREE 0.76 - 1.46 ng/dL 1.05 1.16   FREE T3 2.3 - 4.2 pg/mL     TRIIODOTHYRONINE(T3) 60 - 181 ng/dL  118   THYROGLOBULIN ANTIBODY <40 IU/mL     THYR PEROXIDASE ZARA <35 IU/mL     THYROID STIM IMMUNOG <=1.3 TSI index       ENDO THYROID LABS-[facility] Latest Ref Rng & Units 11/1/2018 9/27/2018   TSH 0.40 - 4.00 mU/L <0.01 (L) <0.01 (L)   T4 FREE 0.76 - 1.46 ng/dL 0.71 (L) 1.71 (H)   FREE T3 2.3 - 4.2 pg/mL     TRIIODOTHYRONINE(T3) 60 - 181 ng/dL 74 186 (H)   THYROGLOBULIN ANTIBODY <40 IU/mL     THYR PEROXIDASE ZARA <35 IU/mL     THYROID STIM IMMUNOG <=1.3 TSI index       ENDO THYROID LABS-[facility] Latest Ref Rng & Units 9/19/2018 8/27/2018   TSH 0.40 - 4.00 mU/L <0.01 (L) <0.01 (L)   T4 FREE 0.76 - 1.46 ng/dL 2.04 (H) 1.11   FREE T3 2.3 - 4.2 pg/mL     TRIIODOTHYRONINE(T3) 60 - 181 ng/dL 188 (H) 153   THYROGLOBULIN ANTIBODY <40 IU/mL     THYR PEROXIDASE ZARA <35 IU/mL     THYROID STIM IMMUNOG <=1.3 TSI index       ENDO THYROID LABS-[facility] Latest Ref Rng & Units 8/20/2018 8/2/2018   TSH 0.40 - 4.00 mU/L <0.01 (L) <0.01 (L)   T4 FREE 0.76 - 1.46 ng/dL 0.80 1.12   FREE T3 2.3 - 4.2 pg/mL     TRIIODOTHYRONINE(T3) 60 - 181 ng/dL 130 116   THYROGLOBULIN ANTIBODY <40 IU/mL     THYR PEROXIDASE ZARA <35 IU/mL     THYROID STIM IMMUNOG <=1.3 TSI index       ENDO THYROID LABS-[facility] Latest Ref Rng & Units 7/23/2018   TSH 0.40 - 4.00 mU/L <0.01 (L)   T4 FREE 0.76 - 1.46 ng/dL 2.08 (H)   FREE T3 2.3 - 4.2 pg/mL    TRIIODOTHYRONINE(T3) 60 - 181 ng/dL 182 (H)   THYROGLOBULIN ANTIBODY <40 IU/mL    THYR PEROXIDASE ZARA <35 IU/mL    THYROID STIM IMMUNOG <=1.3 TSI index      EXAMINATION: US THYROID, 11/26/2018 1:09 PM      COMPARISON: 12/18/2017     HISTORY: Nontoxic multinodular  goiter.     Technique: Grayscale and color ultrasound imaging of the thyroid was  performed.     Findings:    Thyroid parenchyma: Heterogeneous with innumerable nodules, similar in  appearance to 12/18/2017. There remains coarse and dystrophic  appearing calcifications on the left. Multiple punctate echogenic foci  seen. There are also punctate echogenic foci likely representing  inspissated colloid as well as foci that could represent  microcalcifications, unchanged. Multiple nodules demonstrate  hypervascularity, stable.     The right lobe of the thyroid measures: 4.4 x 3.2 x 5.1 cm     The thyroid isthmus measures: 1.6 cm     The left lobe of the thyroid measures: 2.7 x 3 x 5.4 cm          Impression:  Enlarged heterogeneous thyroid gland with innumerable nodules which  are solid and cystic. There are coarse calcifications as well as  echogenic punctate foci likely inspissated colloid with possible  microcalcifications. No significant interval change from prior  ultrasound 12/18/2017.     I have personally reviewed the examination and initial interpretation  and I agree with the findings.     CANDIS ESPAÑA MD     Assessment/Plan:    Mrs. Oviedo has Graves' disease that was diagnosed earlier this year.  She also has a multinodular goiter that has some coarse calcifications that are interpreted to be inspissated colloid by the radiologist.  Today she appears to be clinically euthyroid and this is confirmed by her thyroid function tests.  I recommended we the continue the dose of methimazole 10 mg alternating with 15 mg every other day.  I will plan to repeat her thyroid function tests in 1month and see her back in 3 months.  We discussed the long-term plan which is to keep her on the methimazole for up to 18 months before we consider stopping it.  At that time it is my hope that she will be euthyroid but if not, we may put her back on the methimazole.  Given her history of stress cardiomyopathy, I am reluctant  to take the risk of exposing her to radioactive iodine induced hyperthyroidism by giving her radioactive iodine.      She will be undergoing a PET CT for staging of her bladder cancer.  It is possible that her thyroid nodule will be noted on this scan.  While I think it is by far most likely that it is a benign nodule, it is possible it is not.  Given her other comorbidities, I have elected to follow it for now but will be prepared to send her for fine-needle aspiration biopsy if necessary before she proceeds on her cancer treatment.  She does not need an FNA, I will plan for another ultrasound in 6-12 months.    Dhara Meza MD

## 2018-12-04 NOTE — LETTER
12/4/2018       RE: Dimple Oviedo  [address]  [facility] [address]     Dear Colleague,    Thank you for referring your patient, Dimple Oviedo, to the [facility]. Please see a copy of my visit note below.    This 85 year old woman is here for f/u of Graves.  she first developed symptoms of hyperthyroidism in December 2017.  She was most troubled by the tremor that occurred.  She had not noticed change in her skin or hair.  She saw Dr. Rojas in the community and he started her on methimazole.  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  Because she had received an iodine dye load when she had a coronary angiogram, we were unable to do a radioiodine scan and uptake to determine the precise cause of her hyperthyroidism.  She was maintained on methimazole until June 2017 when it was stopped.  A radioiodine uptake and scan was done in July, showed an uptake of ~ 70%, and confirmed she had Graves' disease.  She was restarted on methimazole in July and we have been adjusting the dose since then.  She has now been on 15 mg alternating with 10 mg for the last month.  On this dose she feels much improved.  Her tremor and lightheadedness are gone.  She has an OK appetite and hasn't changed weight.  She is both hot and cold.  Her hair is coming back.  She doesn't think she has proptosis.      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 She will have a PET CT soon for staging.      Patient Active Problem List   Diagnosis     Esophageal reflux     Restless leg syndrome     heat intolerance     Goiter     Disorder of bone and cartilage     Other psoriasis     perirectal cyst     Malignant melanoma of skin of trunk, except scrotum (H)     Nontoxic multinodular goiter     Hyperlipidemia  LDL goal <130     Hypertension goal BP (blood pressure) < 140/90     Urinary incontinence     Osteoarthritis of left knee     Hip arthritis     Polymyalgia rheumatica (H)     High risk medication use     Shoulder pain     Impaired fasting blood sugar     Chronic bilateral low back pain without sciatica     Obesity, unspecified obesity severity, unspecified obesity type     Iron deficiency anemia, unspecified iron deficiency anemia type     NSTEMI (non-ST elevated myocardial infarction) (H)     Stress-induced cardiomyopathy     A-fib (H)     Anxiety     Chronic systolic heart failure (H)     Urothelial carcinoma (H)     Hyperthyroidism     Restless legs syndrome     CRESENCIO (acute kidney injury) (H)     Hydronephrosis     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 SEE PACKAGE FOR ADDITIONAL INSTRUCTIONS (Patient taking differently: TAKE 1 TABLET EVERY 7 DAYS AT LEAST 60 MINUTES BEFORE BREAKFAST AS DIRECTED. (Tuesdays0)   aspirin 81 MG tablet Take 81 mg by mouth every evening    Calcium Citrate-Vitamin D (CALCIUM + D PO) Take 1 tablet by mouth 2 times daily    colestipol (COLESTID) 1 g tablet Take 1 g by mouth 2 times daily TAKE OTHER MEDS 1 HOUR BEFORE OR 4 HOURS AFTER COLESID   cycloSPORINE (RESTASIS) 0.05 % ophthalmic emulsion Place 1 drop into both eyes 2 times daily   ferrous sulfate 325 (65 Fe) MG TBEC EC tablet Take 325 mg by mouth daily   furosemide (LASIX) 20 MG tablet Take 1 tablet (20 mg) by mouth every morning   irbesartan (AVAPRO) 300 MG tablet TAKE 1 TABLET EVERY DAY (Patient taking differently: Take 300 mg by mouth every morning )   lovastatin (MEVACOR) 40 MG tablet TAKE 1 TABLET AT BEDTIME  "(HYPERLIPIDEMIA LDL GOAL BELOW 130)   Melatonin 10 MG TABS tablet Take 10 mg by mouth as needed for sleep    methimazole (TAPAZOLE) 5 MG tablet Take 1 tablet (5 mg) by mouth 2 times daily   nitroGLYcerin (NITROSTAT) 0.4 MG sublingual tablet For chest pain place 1 tablet under the tongue every 5 minutes for 3 doses. If symptoms persist 5 minutes after 1st dose call 911.   Omega-3 Fatty Acids (FISH OIL) 500 MG CAPS Take 1 capsule by mouth 2 times daily    omeprazole (PRILOSEC) 20 MG CR capsule Take 1 capsule (20 mg) by mouth daily (Patient taking differently: Take 20 mg by mouth every morning )   order for DME Equipment being ordered: Knee high compression for B LE 20-30mmHg, Velcro units for night time (consider hybrid sock as foot swelling is less than leg swelling), Donning device   oxyCODONE IR (ROXICODONE) 5 MG tablet Take 1 tablet (5 mg) by mouth every 4 hours as needed for moderate to severe pain   Probiotic Product (PROBIOTIC ADVANCED PO) Take 1 capsule by mouth every morning    propranolol (INDERAL LA) 60 MG 24 hr capsule Take 1 capsule (60 mg) by mouth daily (Patient taking differently: Take 60 mg by mouth every morning )   rOPINIRole (REQUIP) 0.25 MG tablet Take 1 tablet (0.25 mg) by mouth 2 times daily as needed (restless leg syndrome)   senna-docusate (SENOKOT-S;PERICOLACE) 8.6-50 MG per tablet Take 1 tablet by mouth 2 times daily To prevent constipation   sertraline (ZOLOFT) 100 MG tablet Take 1 tablet (100 mg) by mouth every morning   traZODone (DESYREL) 50 MG tablet TAKE 1/2 TABLET(25 MG)  AT BEDTIME   HYDROcodone-acetaminophen (NORCO) 5-325 MG per tablet Take 1 tablet by mouth every 6 hours as needed for severe pain (Patient not taking: Reported on 12/4/2018)     No current facility-administered medications on file prior to visit.     ROS: 10 point ROS neg other than the symptoms noted above in the HPI.    So Hx - lives independently    Vital signs:   /74  Pulse 54  Ht 1.448 m (4' 9\")  Wt " "64.1 kg (141 lb 6.4 oz)  BMI 30.6 kg/m2  Estimated body mass index is 30.6 kg/(m^2) as calculated from the following:    Height as of this encounter: 1.448 m (4' 9\").    Weight as of this encounter: 64.1 kg (141 lb 6.4 oz).  NAD  Eyes - no periorbital edema, conjunctival injection, scleral icterus, lid lag, proptosis  Neck - thyroid is just palpable and w/o nodules  Lungs - clear  CV - RRR.    Neuro -   DTR 2/4 biceps, no tremor  Skin - normal texture     ENDO THYROID LABS-[facility] Latest Ref Rng & Units 12/4/2018 11/26/2018   TSH 0.40 - 4.00 mU/L <0.01 (L) <0.01 (L)   T4 FREE 0.76 - 1.46 ng/dL 1.05 1.16   FREE T3 2.3 - 4.2 pg/mL     TRIIODOTHYRONINE(T3) 60 - 181 ng/dL  118   THYROGLOBULIN ANTIBODY <40 IU/mL     THYR PEROXIDASE ZARA <35 IU/mL     THYROID STIM IMMUNOG <=1.3 TSI index       ENDO THYROID LABS-[facility] Latest Ref Rng & Units 11/1/2018 9/27/2018   TSH 0.40 - 4.00 mU/L <0.01 (L) <0.01 (L)   T4 FREE 0.76 - 1.46 ng/dL 0.71 (L) 1.71 (H)   FREE T3 2.3 - 4.2 pg/mL     TRIIODOTHYRONINE(T3) 60 - 181 ng/dL 74 186 (H)   THYROGLOBULIN ANTIBODY <40 IU/mL     THYR PEROXIDASE ZARA <35 IU/mL     THYROID STIM IMMUNOG <=1.3 TSI index       ENDO THYROID LABS-[facility] Latest Ref Rng & Units 9/19/2018 8/27/2018   TSH 0.40 - 4.00 mU/L <0.01 (L) <0.01 (L)   T4 FREE 0.76 - 1.46 ng/dL 2.04 (H) 1.11   FREE T3 2.3 - 4.2 pg/mL     TRIIODOTHYRONINE(T3) 60 - 181 ng/dL 188 (H) 153   THYROGLOBULIN ANTIBODY <40 IU/mL     THYR PEROXIDASE ZARA <35 IU/mL     THYROID STIM IMMUNOG <=1.3 TSI index       ENDO THYROID LABS-[facility] Latest Ref Rng & Units 8/20/2018 8/2/2018   TSH 0.40 - 4.00 mU/L <0.01 (L) <0.01 (L)   T4 FREE 0.76 - 1.46 ng/dL 0.80 1.12   FREE T3 2.3 - 4.2 pg/mL     TRIIODOTHYRONINE(T3) 60 - 181 ng/dL 130 116   THYROGLOBULIN ANTIBODY <40 IU/mL     THYR PEROXIDASE ZARA <35 IU/mL     THYROID STIM IMMUNOG <=1.3 TSI index       ENDO THYROID LABS-[facility] Latest Ref Rng & Units 7/23/2018   TSH 0.40 - 4.00 mU/L <0.01 (L)   T4 FREE 0.76 - 1.46 ng/dL 2.08 (H) "   FREE T3 2.3 - 4.2 pg/mL    TRIIODOTHYRONINE(T3) 60 - 181 ng/dL 182 (H)   THYROGLOBULIN ANTIBODY <40 IU/mL    THYR PEROXIDASE ZARA <35 IU/mL    THYROID STIM IMMUNOG <=1.3 TSI index      EXAMINATION: US THYROID, 11/26/2018 1:09 PM      COMPARISON: 12/18/2017     HISTORY: Nontoxic multinodular goiter.     Technique: Grayscale and color ultrasound imaging of the thyroid was  performed.     Findings:    Thyroid parenchyma: Heterogeneous with innumerable nodules, similar in  appearance to 12/18/2017. There remains coarse and dystrophic  appearing calcifications on the left. Multiple punctate echogenic foci  seen. There are also punctate echogenic foci likely representing  inspissated colloid as well as foci that could represent  microcalcifications, unchanged. Multiple nodules demonstrate  hypervascularity, stable.     The right lobe of the thyroid measures: 4.4 x 3.2 x 5.1 cm     The thyroid isthmus measures: 1.6 cm     The left lobe of the thyroid measures: 2.7 x 3 x 5.4 cm          Impression:  Enlarged heterogeneous thyroid gland with innumerable nodules which  are solid and cystic. There are coarse calcifications as well as  echogenic punctate foci likely inspissated colloid with possible  microcalcifications. No significant interval change from prior  ultrasound 12/18/2017.     I have personally reviewed the examination and initial interpretation  and I agree with the findings.     CANDIS ESPAÑA MD     Assessment/Plan:    Mrs. Oviedo has Graves' disease that was diagnosed earlier this year.  She also has a multinodular goiter that has some coarse calcifications that are interpreted to be inspissated colloid by the radiologist.  Today she appears to be clinically euthyroid and this is confirmed by her thyroid function tests.  I recommended we the continue the dose of methimazole 10 mg alternating with 15 mg every other day.  I will plan to repeat her thyroid function tests in 1month and see her back in 3  months.  We discussed the long-term plan which is to keep her on the methimazole for up to 18 months before we consider stopping it.  At that time it is my hope that she will be euthyroid but if not, we may put her back on the methimazole.  Given her history of stress cardiomyopathy, I am reluctant to take the risk of exposing her to radioactive iodine induced hyperthyroidism by giving her radioactive iodine.      She will be undergoing a PET CT for staging of her bladder cancer.  It is possible that her thyroid nodule will be noted on this scan.  While I think it is by far most likely that it is a benign nodule, it is possible it is not.  Given her other comorbidities, I have elected to follow it for now but will be prepared to send her for fine-needle aspiration biopsy if necessary before she proceeds on her cancer treatment.  She does not need an FNA, I will plan for another ultrasound in 6-12 months.    Dhara Meza MD

## 2018-12-04 NOTE — MR AVS SNAPSHOT
After Visit Summary   12/4/2018    Dimple Oviedo    MRN: [id number]           Patient Information     Date Of Birth          6/23/1933        Visit Information        Provider Department      12/4/2018 1:30 PM Dhara Meza MD [facility]        Today's Diagnoses     Graves disease           Follow-ups after your visit        Follow-up notes from your care team     Return in about 3 months (around 3/4/2019).      Your next 10 appointments already scheduled     Dec 11, 2018 10:30 AM CST   (Arrive by 10:15 AM)   PET ONCOLOGY WHOLE BODY with UUPET1   [facility] [place] PET CT ([facility], [facility])    [address] [address] [phone]           How do I prepare for my exam? (Food and drink instructions) Patients should eat a low carb, high protein meal 7 hours (or the last meal you eat) before the scan. Low carb, high protein meals consist of lean meats, seafood, beans, soy, low-fat dairy, eggs, nuts & seeds.  6 hours before your scan: Stop all food and liquids (except water). Do not chew gum or suck on mints. If you need to take medicine with food, you may take it with a few crackers.  How do I prepare for my exam? (Other instructions) If you have diabetes: Have your exam early in the morning. Your blood glucose will go up as the day goes by. Your glucose level must be 180 or less at the start of the exam. Please take any oral diabetic medication you need to ensure this blood glucose level is below 180, but no insulin 4 hours prior to the exam.  * If you are taking insulin in the morning take with breakfast by 6 am and schedule procedure between 12 and 2:15 pm. * If you are taking insulin at night take nightly dose, fast overnight, schedule procedure before 10 am. * If you take insulin both morning and night take morning dose by 6am and schedule procedure between 12 and 2:15 pm.  24 hours before your scan: Don t do any  heavy exercise. (No jogging, aerobics or other workouts.) Exercise will make your pictures less accurate.  What should I wear? Please wear comfortable clothes.  How long does the exam take?  Most scans will take between 2-3 hours.  What should I bring: Please bring a list of your medicines (including vitamins, minerals and over-the-counter drugs). Leave your valuables at home.  Do I need a :  No  is needed.  What do I need to tell my doctor? Tell your doctor: * If there is any chance you may be pregnant or if you are breastfeeding. * If you have problems lying in small spaces (claustrophobia). If you do, your doctor may give you medicine to help you relax.  What should I do after the exam: No restrictions, You may resume normal activities.  What is this test: Your doctor has ordered a PET scan (positron emission tomography). This will take pictures of the cells and organs in your body. Your doctor may have also ordered a CT scan (computed tomography). This is another way to take pictures of your cells and organs. It is done at the same time as the PET scan. The pictures will help us understand your problem so we can make a treatment plan that fits your needs.  Who should I call with questions: Please call your Imaging Department at your exam site with any questions. Directions, parking instructions, and other information is available on our website, [email or web address].            Dec 12, 2018 10:15 AM CST   [facility] Lab Draw with  [facility] LAB DRAW   [facility] Lab Draw ([facility])    [address] [place] [address]  [address]  [facility] [address]   [phone]            Dec 12, 2018 10:45 AM CST   (Arrive by 10:30 AM)   Return Visit with Jaden Toledo MD   [facility] ([facility])    [address] [place] [address]  [address]  [facility] [address]   [phone]            Dec 12, 2018 11:30 AM CST   Infusion 360 with  ONCOLOGY  INFUSION, UC 32 ATC   [facility] ([facility])    [address]  [address]  [facility] [address]   [phone]            Dec 12, 2018 11:30 AM CST   (Arrive by 11:15 AM)   New Patient Visit with Tonie Hamm RD   [facility] ([facility])    [address]  [address]  [facility] [address]   [phone]            Chaitanya 10, 2019 12:40 PM CST   (Arrive by 12:25 PM)   Post-Op with Stuart King MD   [facility] Prostate and Urologic Cancers ([facility])    [address]  4th Floor  [facility] [address] [phone]            Mar 05, 2019  1:30 PM CST   (Arrive by 1:15 PM)   RETURN ENDOCRINE with Dhara Meza MD   [facility] ([facility])    [address]  3rd Floor  [facility] [id number] [phone]              Future tests that were ordered for you today     Open Future Orders        Priority Expected Expires Ordered    TSH Routine 1/1/2019 2/4/2019 12/4/2018    T4 free Routine 1/1/2019 2/4/2019 12/4/2018    T3 total Routine 1/1/2019 2/4/2019 12/4/2018            Who to contact     Please call your clinic at [phone] to:    Ask questions about your health    Make or cancel appointments    Discuss your medicines    Learn about your test results    Speak to your doctor            Additional Information About Your Visit        [facility] Information     [facility] gives you secure access to your electronic health record. If you see a primary care provider, you can also send messages to your care team and make appointments. If you have questions, please call your primary care clinic.  If you do not have a primary care provider, please call [phone] and they will assist you.      [facility] is an electronic gateway that provides easy, online access to your medical records.  "With [facility], you can request a clinic appointment, read your test results, renew a prescription or communicate with your care team.     To access your existing account, please contact your [facility] or call [phone] for assistance.        Care EveryWhere ID     This is your Care EveryWhere ID. This could be used by other organizations to access your [place] medical records  [id number]        Your Vitals Were     Pulse Height BMI (Body Mass Index)             54 1.448 m (4' 9\") 30.6 kg/m2          Blood Pressure from Last 3 Encounters:   12/04/18 138/74   11/27/18 122/68   11/26/18 126/70    Weight from Last 3 Encounters:   12/04/18 64.1 kg (141 lb 6.4 oz)   11/27/18 64 kg (141 lb 3.2 oz)   11/26/18 64 kg (141 lb)                 Today's Medication Changes          These changes are accurate as of 12/4/18  1:50 PM.  If you have any questions, ask your nurse or doctor.               These medicines have changed or have updated prescriptions.        Dose/Directions    alendronate 70 MG tablet   Commonly known as:  FOSAMAX   This may have changed:  See the new instructions.   Used for:  High risk medication use, Osteopenia        TAKE 1 TABLET EVERY 7 DAYS AT LEAST 60 MINUTES BEFORE BREAKFAST AS DIRECTED. SEE PACKAGE FOR ADDITIONAL INSTRUCTIONS   Quantity:  12 tablet   Refills:  0       irbesartan 300 MG tablet   Commonly known as:  AVAPRO   This may have changed:    - how much to take  - how to take this  - when to take this  - additional instructions   Used for:  Essential hypertension with goal blood pressure less than 140/90        TAKE 1 TABLET EVERY DAY   Quantity:  90 tablet   Refills:  2       omeprazole 20 MG DR capsule   Commonly known as:  priLOSEC   This may have changed:  when to take this   Used for:  Gastroesophageal reflux disease without esophagitis        Dose:  20 mg   Take 1 capsule (20 mg) by mouth daily   Quantity:  180 capsule   Refills:  3       propranolol " ER 60 MG 24 hr capsule   Commonly known as:  INDERAL LA   This may have changed:  when to take this   Used for:  Nontoxic multinodular goiter        Dose:  60 mg   Take 1 capsule (60 mg) by mouth daily   Quantity:  90 capsule   Refills:  1                Primary Care Provider Office Phone # Fax #    Pop Antonino Zapien [phone] [address] [place] [id number]        Goals        General    Medical (pt-stated)     Notes - Note created  11/16/2018 11:15 AM by Omayra Gatica, RN    Goal Statement: I will watch for signs of a urinary tract infection   Measure of Success: free flowing urine output   Supportive Steps to Achieve:   I will seek medical help/call urologist if experiencing signs of burning frequency pain blood in urine or fever  I will follow discharge restrictions   I will keep future Urology appointments  I will take pain medication as needed     Barriers: None   Strengths: Lives with son   Date to Achieve By: to be determined   Patient expressed understanding of goal: Yes          Equal Access to Services     GUNNER RODRIGUEZ AH: Aleja Pollack, waberylda lubabatunde, qadevta kaalmacristian chávez, maldonado seay . So [facility] [phone].    ATENCIÓN: Si habla español, tiene a sierra disposición servicios gratuitos de asistencia lingüística. Llame al [phone].    We comply with applicable federal civil rights laws and [place] laws. We do not discriminate on the basis of race, color, national origin, age, disability, sex, sexual orientation, or gender identity.            Thank you!     Thank you for choosing [facility] ENDOCRINOLOGY  for your care. Our goal is always to provide you with excellent care. Hearing back from our patients is one way we can continue to improve our services. Please take a few minutes to complete the written survey that you may receive in the mail after your visit with us. Thank you!             Your Updated Medication  List - Protect others around you: Learn how to safely use, store and throw away your medicines at www.disposemymeds.org.          This list is accurate as of 12/4/18  1:50 PM.  Always use your most recent med list.                   Brand Name Dispense Instructions for use Diagnosis    acetaminophen 650 MG CR tablet    TYLENOL    100 tablet    Take 1 tablet (650 mg) by mouth every 8 hours as needed for pain    Acute post-operative pain       alendronate 70 MG tablet    FOSAMAX    12 tablet    TAKE 1 TABLET EVERY 7 DAYS AT LEAST 60 MINUTES BEFORE BREAKFAST AS DIRECTED. SEE PACKAGE FOR ADDITIONAL INSTRUCTIONS    High risk medication use, Osteopenia       aspirin 81 MG tablet    ASA     Take 81 mg by mouth every evening        CALCIUM + D PO      Take 1 tablet by mouth 2 times daily        colestipol 1 g tablet    COLESTID     Take 1 g by mouth 2 times daily TAKE OTHER MEDS 1 HOUR BEFORE OR 4 HOURS AFTER COLESID        cycloSPORINE 0.05 % ophthalmic emulsion    RESTASIS     Place 1 drop into both eyes 2 times daily        ferrous sulfate 325 (65 Fe) MG EC tablet    FE TABS     Take 325 mg by mouth daily        Fish Oil 500 MG Caps      Take 1 capsule by mouth 2 times daily        furosemide 20 MG tablet    LASIX    90 tablet    Take 1 tablet (20 mg) by mouth every morning    Stasis edema of both lower extremities, (HFpEF) heart failure with preserved ejection fraction (H)       HYDROcodone-acetaminophen 5-325 MG tablet    NORCO    10 tablet    Take 1 tablet by mouth every 6 hours as needed for severe pain        irbesartan 300 MG tablet    AVAPRO    90 tablet    TAKE 1 TABLET EVERY DAY    Essential hypertension with goal blood pressure less than 140/90       lovastatin 40 MG tablet    MEVACOR    90 tablet    TAKE 1 TABLET AT BEDTIME (HYPERLIPIDEMIA LDL GOAL BELOW 130)    Hyperlipidemia LDL goal <130       Melatonin 10 MG Tabs tablet      Take 10 mg by mouth as needed for sleep        methimazole 5 MG tablet    TAPAZOLE     180 tablet    Take 1 tablet (5 mg) by mouth 2 times daily    Nontoxic multinodular goiter       nitroGLYcerin 0.4 MG sublingual tablet    NITROSTAT    25 tablet    For chest pain place 1 tablet under the tongue every 5 minutes for 3 doses. If symptoms persist 5 minutes after 1st dose call 911.    Elevated troponin       omeprazole 20 MG DR capsule    priLOSEC    180 capsule    Take 1 capsule (20 mg) by mouth daily    Gastroesophageal reflux disease without esophagitis       order for DME     1 each    Equipment being ordered: Knee high compression for B LE 20-30mmHg, Velcro units for night time (consider hybrid sock as foot swelling is less than leg swelling), Donning device    Lymphedema of both lower extremities       oxyCODONE 5 MG tablet    ROXICODONE    15 tablet    Take 1 tablet (5 mg) by mouth every 4 hours as needed for moderate to severe pain    Acute post-operative pain       PROBIOTIC ADVANCED PO      Take 1 capsule by mouth every morning        propranolol ER 60 MG 24 hr capsule    INDERAL LA    90 capsule    Take 1 capsule (60 mg) by mouth daily    Nontoxic multinodular goiter       rOPINIRole 0.25 MG tablet    REQUIP    180 tablet    Take 1 tablet (0.25 mg) by mouth 2 times daily as needed (restless leg syndrome)    Restless legs syndrome       senna-docusate 8.6-50 MG tablet    SENOKOT-S/PERICOLACE    60 tablet    Take 1 tablet by mouth 2 times daily To prevent constipation    Acute post-operative pain       sertraline 100 MG tablet    ZOLOFT    90 tablet    Take 1 tablet (100 mg) by mouth every morning    THERON (generalized anxiety disorder)       traZODone 50 MG tablet    DESYREL    45 tablet    TAKE 1/2 TABLET(25 MG)  AT BEDTIME    Insomnia, unspecified type

## 2018-12-11 ENCOUNTER — HOSPITAL ENCOUNTER (OUTPATIENT)
Dept: PET IMAGING | Facility: CLINIC | Age: 83
Setting detail: NUCLEAR MEDICINE
Discharge: HOME | End: 2018-12-11
Attending: INTERNAL MEDICINE | Admitting: INTERNAL MEDICINE
Payer: MEDICARE

## 2018-12-11 DIAGNOSIS — C66.1 UROTHELIAL CARCINOMA OF RIGHT DISTAL URETER (H): ICD-10-CM

## 2018-12-11 LAB — GLUCOSE BLDC GLUCOMTR-MCNC: 99 MG/DL (ref 70–99)

## 2018-12-11 PROCEDURE — 25000128 H RX IP 250 OP 636: Performed by: INTERNAL MEDICINE

## 2018-12-11 PROCEDURE — 78816 PET IMAGE W/CT FULL BODY: CPT | Mod: PI

## 2018-12-11 PROCEDURE — A9552 F18 FDG: HCPCS | Performed by: INTERNAL MEDICINE

## 2018-12-11 PROCEDURE — 82962 GLUCOSE BLOOD TEST: CPT

## 2018-12-11 PROCEDURE — 34300033 ZZH RX 343: Performed by: INTERNAL MEDICINE

## 2018-12-11 RX ORDER — DEXAMETHASONE 4 MG/1
8 TABLET ORAL DAILY
Qty: 6 TABLET | Refills: 5 | Status: SHIPPED | OUTPATIENT
Start: 2018-12-13 | End: 2019-01-09

## 2018-12-11 RX ORDER — FUROSEMIDE 10 MG/ML
40 INJECTION INTRAMUSCULAR; INTRAVENOUS ONCE
Status: COMPLETED | OUTPATIENT
Start: 2018-12-11 | End: 2018-12-11

## 2018-12-11 RX ORDER — PROCHLORPERAZINE MALEATE 10 MG
5 TABLET ORAL EVERY 6 HOURS PRN
Qty: 30 TABLET | Refills: 5 | Status: SHIPPED | OUTPATIENT
Start: 2018-12-11 | End: 2018-12-12

## 2018-12-11 RX ADMIN — FLUDEOXYGLUCOSE F-18 10.18 MCI.: 500 INJECTION, SOLUTION INTRAVENOUS at 10:21

## 2018-12-11 RX ADMIN — FUROSEMIDE 40 MG: 10 INJECTION, SOLUTION INTRAVENOUS at 11:16

## 2018-12-12 ENCOUNTER — INFUSION THERAPY VISIT (OUTPATIENT)
Dept: ONCOLOGY | Facility: CLINIC | Age: 83
End: 2018-12-12
Attending: INTERNAL MEDICINE
Payer: MEDICARE

## 2018-12-12 ENCOUNTER — APPOINTMENT (OUTPATIENT)
Dept: LAB | Facility: CLINIC | Age: 83
End: 2018-12-12
Attending: INTERNAL MEDICINE
Payer: MEDICARE

## 2018-12-12 ENCOUNTER — ONCOLOGY VISIT (OUTPATIENT)
Dept: ONCOLOGY | Facility: CLINIC | Age: 83
End: 2018-12-12
Attending: DIETITIAN, REGISTERED
Payer: MEDICARE

## 2018-12-12 VITALS
DIASTOLIC BLOOD PRESSURE: 50 MMHG | TEMPERATURE: 96.8 F | BODY MASS INDEX: 30.71 KG/M2 | HEART RATE: 98 BPM | WEIGHT: 141.9 LBS | OXYGEN SATURATION: 97 % | SYSTOLIC BLOOD PRESSURE: 124 MMHG

## 2018-12-12 DIAGNOSIS — C66.1 UROTHELIAL CARCINOMA OF RIGHT DISTAL URETER (H): ICD-10-CM

## 2018-12-12 DIAGNOSIS — C68.9 UROTHELIAL CARCINOMA (H): ICD-10-CM

## 2018-12-12 DIAGNOSIS — C68.9 UROTHELIAL CARCINOMA (H): Primary | ICD-10-CM

## 2018-12-12 LAB
ALBUMIN SERPL-MCNC: 3 G/DL (ref 3.4–5)
ALP SERPL-CCNC: 95 U/L (ref 40–150)
ALT SERPL W P-5'-P-CCNC: 20 U/L (ref 0–50)
ANION GAP SERPL CALCULATED.3IONS-SCNC: 9 MMOL/L (ref 3–14)
AST SERPL W P-5'-P-CCNC: 20 U/L (ref 0–45)
BASOPHILS # BLD AUTO: 0.1 10E9/L (ref 0–0.2)
BASOPHILS NFR BLD AUTO: 0.7 %
BILIRUB SERPL-MCNC: 0.3 MG/DL (ref 0.2–1.3)
BUN SERPL-MCNC: 34 MG/DL (ref 7–30)
CALCIUM SERPL-MCNC: 8.9 MG/DL (ref 8.5–10.1)
CHLORIDE SERPL-SCNC: 99 MMOL/L (ref 94–109)
CO2 SERPL-SCNC: 25 MMOL/L (ref 20–32)
CREAT SERPL-MCNC: 1.16 MG/DL (ref 0.52–1.04)
DIFFERENTIAL METHOD BLD: ABNORMAL
EOSINOPHIL # BLD AUTO: 0.2 10E9/L (ref 0–0.7)
EOSINOPHIL NFR BLD AUTO: 2.1 %
ERYTHROCYTE [DISTWIDTH] IN BLOOD BY AUTOMATED COUNT: 14.9 % (ref 10–15)
GFR SERPL CREATININE-BSD FRML MDRD: 44 ML/MIN/1.7M2
GLUCOSE SERPL-MCNC: 142 MG/DL (ref 70–99)
HCT VFR BLD AUTO: 33.7 % (ref 35–47)
HGB BLD-MCNC: 10.5 G/DL (ref 11.7–15.7)
IMM GRANULOCYTES # BLD: 0 10E9/L (ref 0–0.4)
IMM GRANULOCYTES NFR BLD: 0.4 %
LYMPHOCYTES # BLD AUTO: 1.6 10E9/L (ref 0.8–5.3)
LYMPHOCYTES NFR BLD AUTO: 22.3 %
MAGNESIUM SERPL-MCNC: 2.1 MG/DL (ref 1.6–2.3)
MCH RBC QN AUTO: 26.8 PG (ref 26.5–33)
MCHC RBC AUTO-ENTMCNC: 31.2 G/DL (ref 31.5–36.5)
MCV RBC AUTO: 86 FL (ref 78–100)
MONOCYTES # BLD AUTO: 0.6 10E9/L (ref 0–1.3)
MONOCYTES NFR BLD AUTO: 9.1 %
NEUTROPHILS # BLD AUTO: 4.6 10E9/L (ref 1.6–8.3)
NEUTROPHILS NFR BLD AUTO: 65.4 %
NRBC # BLD AUTO: 0 10*3/UL
NRBC BLD AUTO-RTO: 0 /100
PLATELET # BLD AUTO: 289 10E9/L (ref 150–450)
POTASSIUM SERPL-SCNC: 4.5 MMOL/L (ref 3.4–5.3)
PROT SERPL-MCNC: 7.3 G/DL (ref 6.8–8.8)
RBC # BLD AUTO: 3.92 10E12/L (ref 3.8–5.2)
SODIUM SERPL-SCNC: 132 MMOL/L (ref 133–144)
WBC # BLD AUTO: 7 10E9/L (ref 4–11)

## 2018-12-12 PROCEDURE — 96417 CHEMO IV INFUS EACH ADDL SEQ: CPT

## 2018-12-12 PROCEDURE — G0463 HOSPITAL OUTPT CLINIC VISIT: HCPCS | Mod: ZF

## 2018-12-12 PROCEDURE — 85025 COMPLETE CBC W/AUTO DIFF WBC: CPT | Performed by: INTERNAL MEDICINE

## 2018-12-12 PROCEDURE — 25000128 H RX IP 250 OP 636: Mod: ZF | Performed by: INTERNAL MEDICINE

## 2018-12-12 PROCEDURE — 99215 OFFICE O/P EST HI 40 MIN: CPT | Mod: ZP | Performed by: INTERNAL MEDICINE

## 2018-12-12 PROCEDURE — 97802 MEDICAL NUTRITION INDIV IN: CPT | Mod: ZF | Performed by: DIETITIAN, REGISTERED

## 2018-12-12 PROCEDURE — 83735 ASSAY OF MAGNESIUM: CPT | Performed by: INTERNAL MEDICINE

## 2018-12-12 PROCEDURE — 96375 TX/PRO/DX INJ NEW DRUG ADDON: CPT

## 2018-12-12 PROCEDURE — 80053 COMPREHEN METABOLIC PANEL: CPT | Performed by: INTERNAL MEDICINE

## 2018-12-12 PROCEDURE — 96413 CHEMO IV INFUSION 1 HR: CPT

## 2018-12-12 RX ORDER — DIPHENHYDRAMINE HYDROCHLORIDE 50 MG/ML
50 INJECTION INTRAMUSCULAR; INTRAVENOUS
Status: CANCELLED
Start: 2018-12-12

## 2018-12-12 RX ORDER — LORAZEPAM 2 MG/ML
0.5 INJECTION INTRAMUSCULAR EVERY 4 HOURS PRN
Status: CANCELLED
Start: 2018-12-12

## 2018-12-12 RX ORDER — METHYLPREDNISOLONE SODIUM SUCCINATE 125 MG/2ML
125 INJECTION, POWDER, LYOPHILIZED, FOR SOLUTION INTRAMUSCULAR; INTRAVENOUS
Status: CANCELLED
Start: 2018-12-19

## 2018-12-12 RX ORDER — ONDANSETRON 8 MG/1
8 TABLET, FILM COATED ORAL EVERY 8 HOURS PRN
Qty: 30 TABLET | Refills: 5 | Status: SHIPPED | OUTPATIENT
Start: 2018-12-12 | End: 2019-02-13

## 2018-12-12 RX ORDER — EPINEPHRINE 0.3 MG/.3ML
0.3 INJECTION SUBCUTANEOUS EVERY 5 MIN PRN
Status: CANCELLED | OUTPATIENT
Start: 2018-12-12

## 2018-12-12 RX ORDER — ALBUTEROL SULFATE 0.83 MG/ML
2.5 SOLUTION RESPIRATORY (INHALATION)
Status: CANCELLED | OUTPATIENT
Start: 2018-12-12

## 2018-12-12 RX ORDER — SODIUM CHLORIDE 9 MG/ML
1000 INJECTION, SOLUTION INTRAVENOUS CONTINUOUS PRN
Status: CANCELLED
Start: 2018-12-12

## 2018-12-12 RX ORDER — MEPERIDINE HYDROCHLORIDE 25 MG/ML
25 INJECTION INTRAMUSCULAR; INTRAVENOUS; SUBCUTANEOUS EVERY 30 MIN PRN
Status: CANCELLED | OUTPATIENT
Start: 2018-12-19

## 2018-12-12 RX ORDER — METHYLPREDNISOLONE SODIUM SUCCINATE 125 MG/2ML
125 INJECTION, POWDER, LYOPHILIZED, FOR SOLUTION INTRAMUSCULAR; INTRAVENOUS
Status: CANCELLED
Start: 2018-12-12

## 2018-12-12 RX ORDER — MEPERIDINE HYDROCHLORIDE 25 MG/ML
25 INJECTION INTRAMUSCULAR; INTRAVENOUS; SUBCUTANEOUS EVERY 30 MIN PRN
Status: CANCELLED | OUTPATIENT
Start: 2018-12-12

## 2018-12-12 RX ORDER — EPINEPHRINE 0.3 MG/.3ML
0.3 INJECTION SUBCUTANEOUS EVERY 5 MIN PRN
Status: CANCELLED | OUTPATIENT
Start: 2018-12-19

## 2018-12-12 RX ORDER — POLYETHYLENE GLYCOL 3350 17 G/17G
17 POWDER, FOR SOLUTION ORAL
COMMUNITY
Start: 2015-11-02 | End: 2020-01-10

## 2018-12-12 RX ORDER — DIPHENHYDRAMINE HYDROCHLORIDE 50 MG/ML
50 INJECTION INTRAMUSCULAR; INTRAVENOUS
Status: CANCELLED
Start: 2018-12-19

## 2018-12-12 RX ORDER — ALBUTEROL SULFATE 0.83 MG/ML
2.5 SOLUTION RESPIRATORY (INHALATION)
Status: CANCELLED | OUTPATIENT
Start: 2018-12-19

## 2018-12-12 RX ORDER — ALBUTEROL SULFATE 90 UG/1
1-2 AEROSOL, METERED RESPIRATORY (INHALATION)
Status: CANCELLED
Start: 2018-12-12

## 2018-12-12 RX ORDER — PROCHLORPERAZINE MALEATE 10 MG
5 TABLET ORAL EVERY 6 HOURS PRN
Qty: 30 TABLET | Refills: 5 | Status: SHIPPED | OUTPATIENT
Start: 2018-12-12 | End: 2019-09-11

## 2018-12-12 RX ORDER — SODIUM CHLORIDE 9 MG/ML
1000 INJECTION, SOLUTION INTRAVENOUS CONTINUOUS PRN
Status: CANCELLED
Start: 2018-12-19

## 2018-12-12 RX ORDER — LORAZEPAM 2 MG/ML
0.5 INJECTION INTRAMUSCULAR EVERY 4 HOURS PRN
Status: CANCELLED
Start: 2018-12-19

## 2018-12-12 RX ORDER — EPINEPHRINE 1 MG/ML
0.3 INJECTION, SOLUTION INTRAMUSCULAR; SUBCUTANEOUS EVERY 5 MIN PRN
Status: CANCELLED | OUTPATIENT
Start: 2018-12-19

## 2018-12-12 RX ORDER — ALBUTEROL SULFATE 90 UG/1
1-2 AEROSOL, METERED RESPIRATORY (INHALATION)
Status: CANCELLED
Start: 2018-12-19

## 2018-12-12 RX ORDER — EPINEPHRINE 1 MG/ML
0.3 INJECTION, SOLUTION INTRAMUSCULAR; SUBCUTANEOUS EVERY 5 MIN PRN
Status: CANCELLED | OUTPATIENT
Start: 2018-12-12

## 2018-12-12 RX ADMIN — SODIUM CHLORIDE 250 ML: 9 INJECTION, SOLUTION INTRAVENOUS at 12:22

## 2018-12-12 RX ADMIN — DEXAMETHASONE SODIUM PHOSPHATE: 10 INJECTION, SOLUTION INTRAMUSCULAR; INTRAVENOUS at 12:32

## 2018-12-12 RX ADMIN — CARBOPLATIN 265 MG: 10 INJECTION, SOLUTION INTRAVENOUS at 14:12

## 2018-12-12 RX ADMIN — GEMCITABINE 1600 MG: 38 INJECTION, SOLUTION INTRAVENOUS at 13:32

## 2018-12-12 ASSESSMENT — PAIN SCALES - GENERAL: PAINLEVEL: NO PAIN (0)

## 2018-12-12 NOTE — PATIENT INSTRUCTIONS
Avoid taking prochlorperazine (compazine) within 12 hours of the ropinirole (Requip). You may still take ondansetron (zofran) for nausea as needed during this time.     Contact Numbers    [facility] Main Line: [phone]  [facility] Triage and after hours / weekends / holidays:  [phone]      Please call the triage or after hours line if you experience a temperature greater than or equal to 100.5, shaking chills, have uncontrolled nausea, vomiting and/or diarrhea, dizziness, shortness of breath, chest pain, bleeding, unexplained bruising, or if you have any other new/concerning symptoms, questions or concerns.      If you are having any concerning symptoms or wish to speak to a provider before your next infusion visit, please call your care coordinator or triage to notify them so we can adequately serve you.     If you need a refill on a narcotic prescription or other medication, please call before your infusion appointment.           December 2018 Sunday Monday Tuesday Wednesday Thursday Friday Saturday                                 1       2     3     4    LAB  12:45 PM   (15 min.)    LAB   [facility] Lab    [facility] RETURN ENDOCRINE   1:15 PM   (30 min.)   Dhara Meza MD   [facility] Endocrinology 5     6     7     8       9     10     11    PET ONCOLOGY WHOLE BODY  10:15 AM   (45 min.)   UUPET1   [facility] [place] [facility] CT 12    [facility]  10:15 AM   (15 min.)   [facility] LAB DRAW   [facility] Lab Draw    [facility] RETURN  10:30 AM   (30 min.)   Jaden Toledo MD   [facility] NEW  11:15 AM   (30 min.)   Tonie Bhardwaj, ADEOLA   [facility] ONC INFUSION 360  11:30 AM   (360 min.)    ONCOLOGY INFUSION   [facility] 13     14     15       16     17     18     19    [facility] ONC INFUSION 60   9:00 AM   (60 min.)    ONCOLOGY INFUSION   [facility] 20     21     22       23     24     25     26     27     28      29       30 31 January 2019 Sunday Monday Tuesday Wednesday Thursday Friday Saturday             1     2     3     4     5       6     7     8     9     10    [facility] POST-OP  12:25 PM   (20 min.)   Weight, Stuart Blackmon MD   [facility] Pro[facility] Urologic Cancers 11     12       13     14     15     16     17     18     19       20     21     22     23     24     25     26       27     28     29     30     31                              Recent Results (from the past 24 hour(s))   CBC with platelets differential    Collection Time: 12/12/18 10:50 AM   Result Value Ref Range    WBC 7.0 4.0 - 11.0 10e9/L    RBC Count 3.92 3.8 - 5.2 10e12/L    Hemoglobin 10.5 (L) 11.7 - 15.7 g/dL    Hematocrit 33.7 (L) 35.0 - 47.0 %    MCV 86 78 - 100 fl    MCH 26.8 26.5 - 33.0 pg    MCHC 31.2 (L) 31.5 - 36.5 g/dL    RDW 14.9 10.0 - 15.0 %    Platelet Count 289 150 - 450 10e9/L    Diff Method Automated Method     % Neutrophils 65.4 %    % Lymphocytes 22.3 %    % Monocytes 9.1 %    % Eosinophils 2.1 %    % Basophils 0.7 %    % Immature Granulocytes 0.4 %    Nucleated RBCs 0 0 /100    Absolute Neutrophil 4.6 1.6 - 8.3 10e9/L    Absolute Lymphocytes 1.6 0.8 - 5.3 10e9/L    Absolute Monocytes 0.6 0.0 - 1.3 10e9/L    Absolute Eosinophils 0.2 0.0 - 0.7 10e9/L    Absolute Basophils 0.1 0.0 - 0.2 10e9/L    Abs Immature Granulocytes 0.0 0 - 0.4 10e9/L    Absolute Nucleated RBC 0.0    Comprehensive metabolic panel    Collection Time: 12/12/18 10:50 AM   Result Value Ref Range    Sodium 132 (L) 133 - 144 mmol/L    Potassium 4.5 3.4 - 5.3 mmol/L    Chloride 99 94 - 109 mmol/L    Carbon Dioxide 25 20 - 32 mmol/L    Anion Gap 9 3 - 14 mmol/L    Glucose 142 (H) 70 - 99 mg/dL    Urea Nitrogen 34 (H) 7 - 30 mg/dL    Creatinine 1.16 (H) 0.52 - 1.04 mg/dL    GFR Estimate 44 (L) >60 mL/min/1.7m2    GFR Estimate If Black 54 (L) >60 mL/min/1.7m2    Calcium 8.9 8.5 - 10.1 mg/dL     Bilirubin Total 0.3 0.2 - 1.3 mg/dL    Albumin 3.0 (L) 3.4 - 5.0 g/dL    Protein Total 7.3 6.8 - 8.8 g/dL    Alkaline Phosphatase 95 40 - 150 U/L    ALT 20 0 - 50 U/L    AST 20 0 - 45 U/L   Magnesium    Collection Time: 12/12/18 10:50 AM   Result Value Ref Range    Magnesium 2.1 1.6 - 2.3 mg/dL

## 2018-12-12 NOTE — NURSING NOTE
"Oncology Rooming Note    December 12, 2018 11:02 AM   Dimple Oviedo is a 85 year old female who presents for:    Chief Complaint   Patient presents with     Blood Draw     labs drawn via PIV placed by vascular access RN     Oncology Clinic Visit     Return; Renal CA and S/P Nephrourectomy     Initial Vitals: /50 (BP Location: Left arm, Patient Position: Chair, Cuff Size: Adult Regular)   Pulse 98   Temp 96.8  F (36  C) (Oral)   Wt 64.4 kg (141 lb 14.4 oz)   SpO2 97%   BMI 30.71 kg/m   Estimated body mass index is 30.71 kg/m  as calculated from the following:    Height as of 12/4/18: 1.448 m (4' 9\").    Weight as of this encounter: 64.4 kg (141 lb 14.4 oz). Body surface area is 1.61 meters squared.  No Pain (0) Comment: Data Unavailable   No LMP recorded. Patient is postmenopausal.  Allergies reviewed: Yes  Medications reviewed: Yes    Medications: Medication refills not needed today.  Pharmacy name entered into [employer]:    [id number] [address] [place] [address] AVE AT [address] PHARMACY MAIL DELIVERY - [facility] [address]    Clinical concerns: Patient has questions about getting a port. Toledo was notified.    7 minutes for nursing intake (face to face time)     Bailey Valdez MA            "

## 2018-12-12 NOTE — NURSING NOTE
Chief Complaint   Patient presents with     Blood Draw     labs drawn via PIV placed by vascular access RN     /50 (BP Location: Left arm, Patient Position: Chair, Cuff Size: Adult Regular)   Pulse 98   Temp 96.8  F (36  C) (Oral)   Wt 64.4 kg (141 lb 14.4 oz)   SpO2 97%   BMI 30.71 kg/m      PIV placed left lower forearm by vascular access RN in lab for infusion and labs. Labs drawn and sent. Pt tolerated well. Pt checked in for next appointment.    Gaby Valero

## 2018-12-12 NOTE — LETTER
12/12/2018       RE: Dimple Oviedo  [address]  [facility] [address]     Dear Colleague,    Thank you for referring your patient, Dimple Oviedo, to the [facility]. Please see a copy of my visit note below.    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    HISTORY OF PRESENT ILLNESS:  Ms. 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 Family accompanies her for the visit.    Ms. Oviedo has recovered well from her extensive surgery. She has had a good discussion with family about pros and cons of chemotherapy and is wanting to proceed today. No signs/symptoms of locoregional or distant metastatic disease, or acute or chronic illnesses that may preclude chemotherapy delivery today.    ONCOLOGIC HISTORY:  1. 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   Jan 2018 to September 2018- Persistent gross hematuria and underwent multiple procedures.    2/19/18 and 4/3/18- cystoscopies with bladder biopsies  which were negative for bladder tumor  1/17/18, 3/8/18, 7/26/18, 9/10/18- Multiple urine cytologies have come back positive by FISH for high-grade transitional cell carcinoma  9/10/18- Underwent a bilateral cystoureteroscopy with biopsy and brushing that showed  right upper tract cytology suspicious for high-grade urothelial ca. Right ureter brushing negative from that day. Left upper tract negative.  9/10/18- CT A/P without contrast showed new small bilateral pleural effusions and interstitial pulmonary edema but no evidence of locoregional or metastatic disease.  9/12/18- Presented to ED with  "oliguria, CRESENCIO, and mild hydronephrosis bilaterally on CT scan and underwent bilateral ureteral stent placment  11/13/2018- Right robotic nephroureterectomy, excision of ana-caval mass and LN excision by Stuart King. Pathology showed \"Right nephroureterectomy: Invasive high grade urothelial carcinoma measuring 3.5 cm, located in renal pelvis and invading through renal parenchyma into perinephric fat. Urothelial carcinoma in-situ present. Margins free of tumor. Ana-caval mass: Metastatic urothelial carcinoma involving one lymph node with at least 1 cm tumor deposit. Pericaval Extranodal Extension present. Five additional benign pericaval lymph nodes. Pathologic stage: pT4N1 (1 of 6 lymph nodes).\"  12/11/18- PET/CT whole body demonstrated significant residual FDG avid disease. For example, \"there is an FDG avid ill-defined pericaval mass immediately medial to the surgical clips measuring approximately 2.0 x 1.4 cm, with max SUV measuring up to12.2, see series 4 image 21. Additional FDG avid retrocaval and interaortocaval lymphadenopathy are noted.There is a 1.2 cm nodule in the right hemipelvis posterior lateral tothe bladder with max SUV measuring 8.3, see series 4 image 77. The bladder is incompletely distended.\" No suspicious thoracic or bone uptake.  12/12/18- Started adjuvant chemotherapy (carbo+gem) per POUT trial.    REVIEW OF SYSTEMS: 14 point ROS negative other than the symptoms noted above in the HPI.    PAST MEDICAL AND SURGICAL HISTORY:   Active Ambulatory Problems     Diagnosis Date Noted     Esophageal reflux 09/22/2004     Restless leg syndrome 10/12/2005     heat intolerance 10/12/2005     Goiter 10/12/2005     Disorder of bone and cartilage 10/12/2005     Other psoriasis 10/12/2005     perirectal cyst 12/16/2005     Malignant melanoma of skin of trunk, except scrotum (H)      Nontoxic multinodular goiter      Hyperlipidemia LDL goal <130 05/09/2010     Hypertension goal BP (blood pressure) < " 140/90 11/30/2011     Urinary incontinence 11/30/2011     Osteoarthritis of left knee 11/30/2011     Hip arthritis 06/23/2014     Polymyalgia rheumatica (H) 07/10/2014     High risk medication use 10/15/2014     Shoulder pain 10/31/2014     Impaired fasting blood sugar 11/24/2015     Chronic bilateral low back pain without sciatica 12/05/2016     Obesity, unspecified obesity severity, unspecified obesity type 02/22/2017     Iron deficiency anemia, unspecified iron deficiency anemia type 11/30/2017     NSTEMI (non-ST elevated myocardial infarction) (H) 12/13/2017     Stress-induced cardiomyopathy 12/14/2017     A-fib (H) 01/16/2018     Anxiety 02/07/2018     Chronic systolic heart failure (H) 02/20/2018     Urothelial carcinoma (H) 03/22/2018     Hyperthyroidism 06/28/2018     Restless legs syndrome 07/11/2018     CRESENCIO (acute kidney injury) (H) 09/11/2018     Hydronephrosis 09/11/2018     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     Graves disease 12/04/2018     Resolved Ambulatory Problems     Diagnosis Date Noted     Undiagnosed cardiac murmurs      Knee pain 01/09/2013     Disorder of bursae and tendons in shoulder region 09/30/2014     Sepsis (H) 10/30/2015     Shoulder joint pain, unspecified laterality 03/30/2016     Injury of right rotator cuff 03/30/2016     Atrial fibrillation, unspecified type (H) 01/25/2018     (HFpEF) heart failure with preserved ejection fraction (H) 07/06/2018     Past Medical History:   Diagnosis Date     Calculus of kidney      Esophageal reflux      GERD (gastroesophageal reflux disease)      Hyperlipidemia LDL goal <130 5/9/2010     Malignant melanoma of skin of trunk, except scrotum (H)      Nonspecific abnormal finding      Nontoxic multinodular goiter      Osteopenia      Other psoriasis      Personal history of colonic polyps      PMR (polymyalgia rheumatica) (H)      Stress-induced cardiomyopathy      Undiagnosed cardiac murmurs      Unspecified constipation       Unspecified essential hypertension      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     SOCIAL HISTORY:   Social History     Tobacco Use     Smoking status: Never Smoker     Smokeless tobacco: Never Used   Substance Use Topics     Alcohol use: No     Alcohol/week: 0.0 oz     Comment: 6 times per year-one drink     Drug use: No     FAMILY HISTORY:   Family History   Problem Relation Age of Onset     Cancer Father         dec - esophageal and laryngeal     Heart Disease Mother      Respiratory Mother         dec     Breast Cancer Daughter      Other Cancer Daughter      Thyroid Disease Daughter      Asthma Daughter      Hyperlipidemia Son      Diabetes Son      ALLERGIES:   Allergies   Allergen Reactions     Codeine      CURRENT MEDICATIONS:   Current Outpatient Medications:      acetaminophen (TYLENOL) 650 MG CR tablet, Take 1 tablet (650 mg) by mouth every 8 hours as needed for pain, Disp: 100 tablet, Rfl: 0     alendronate (FOSAMAX) 70 MG tablet, TAKE 1 TABLET EVERY 7 DAYS AT LEAST 60 MINUTES BEFORE BREAKFAST AS DIRECTED. SEE PACKAGE FOR ADDITIONAL INSTRUCTIONS (Patient taking differently: TAKE 1 TABLET EVERY 7 DAYS AT LEAST 60 MINUTES BEFORE BREAKFAST AS DIRECTED. (Tuesdays0), Disp: 12 tablet, Rfl: 0     aspirin 81 MG tablet, Take 81 mg by mouth every evening , Disp: , Rfl:      Calcium Citrate-Vitamin D (CALCIUM + D PO), Take 1 tablet by mouth 2 times daily , Disp: , Rfl:      colestipol (COLESTID) 1 g tablet, Take 1 g by mouth 2 times daily TAKE OTHER MEDS 1 HOUR BEFORE OR 4 HOURS AFTER COLESID, Disp: , Rfl:      cycloSPORINE (RESTASIS) 0.05 % ophthalmic emulsion, Place 1 drop into both eyes 2 times daily, Disp: , Rfl:      ferrous sulfate 325 (65 Fe) MG TBEC EC tablet, Take 325 mg by mouth daily, Disp: , Rfl:      furosemide (LASIX) 20 MG tablet, Take 1 tablet (20 mg) by mouth every morning, Disp: 90 tablet, Rfl: 1     irbesartan (AVAPRO) 300 MG tablet, TAKE 1 TABLET EVERY DAY (Patient taking differently: Take 300  mg by mouth every morning ), Disp: 90 tablet, Rfl: 2     lovastatin (MEVACOR) 40 MG tablet, TAKE 1 TABLET AT BEDTIME (HYPERLIPIDEMIA LDL GOAL BELOW 130), Disp: 90 tablet, Rfl: 2     Melatonin 10 MG TABS tablet, Take 10 mg by mouth as needed for sleep , Disp: , Rfl:      methimazole (TAPAZOLE) 5 MG tablet, Take 1 tablet (5 mg) by mouth 2 times daily, Disp: 180 tablet, Rfl: 3     nitroGLYcerin (NITROSTAT) 0.4 MG sublingual tablet, For chest pain place 1 tablet under the tongue every 5 minutes for 3 doses. If symptoms persist 5 minutes after 1st dose call 911., Disp: 25 tablet, Rfl: 3     Omega-3 Fatty Acids (FISH OIL) 500 MG CAPS, Take 1 capsule by mouth 2 times daily , Disp: , Rfl:      omeprazole (PRILOSEC) 20 MG CR capsule, Take 1 capsule (20 mg) by mouth daily (Patient taking differently: Take 20 mg by mouth every morning ), Disp: 180 capsule, Rfl: 3     order for DME, Equipment being ordered: Knee high compression for B LE 20-30mmHg, Velcro units for night time (consider hybrid sock as foot swelling is less than leg swelling), Donning device, Disp: 1 each, Rfl: 2     polyethylene glycol (MIRALAX/GLYCOLAX) packet, Take 17 g by mouth, Disp: , Rfl:      Probiotic Product (PROBIOTIC ADVANCED PO), Take 1 capsule by mouth every morning , Disp: , Rfl:      rOPINIRole (REQUIP) 0.25 MG tablet, Take 1 tablet (0.25 mg) by mouth 2 times daily as needed (restless leg syndrome), Disp: 180 tablet, Rfl: 1     senna-docusate (SENOKOT-S;PERICOLACE) 8.6-50 MG per tablet, Take 1 tablet by mouth 2 times daily To prevent constipation, Disp: 60 tablet, Rfl: 0     sertraline (ZOLOFT) 100 MG tablet, Take 1 tablet (100 mg) by mouth every morning, Disp: 90 tablet, Rfl: 1     traZODone (DESYREL) 50 MG tablet, TAKE 1/2 TABLET(25 MG)  AT BEDTIME, Disp: 45 tablet, Rfl: 1     dexamethasone (DECADRON) 4 MG tablet, Take 8 mg by mouth daily Take for 3 days, starting the day after cisplatin (Day 2).. (Patient not taking: Reported on 12/12/2018.),  Disp: 6 tablet, Rfl: 5     HYDROcodone-acetaminophen (NORCO) 5-325 MG per tablet, Take 1 tablet by mouth every 6 hours as needed for severe pain (Patient not taking: Reported on 12/12/2018), Disp: 10 tablet, Rfl: 0     ondansetron (ZOFRAN) 8 MG tablet, Take 1 tablet (8 mg) by mouth every 8 hours as needed (Nausea/Vomiting), Disp: 30 tablet, Rfl: 5     oxyCODONE IR (ROXICODONE) 5 MG tablet, Take 1 tablet (5 mg) by mouth every 4 hours as needed for moderate to severe pain (Patient not taking: Reported on 12/12/2018), Disp: 15 tablet, Rfl: 0     prochlorperazine (COMPAZINE) 10 MG tablet, Take 0.5 tablets (5 mg) by mouth every 6 hours as needed (Nausea/Vomiting - take if Zofran doesn't work after 30 mins.), Disp: 30 tablet, Rfl: 5     propranolol ER (INDERAL LA) 60 MG 24 hr capsule, TAKE 1 CAPSULE EVERY DAY, Disp: 30 capsule, Rfl: 0    PHYSICAL EXAMINATION:  Vital signs: /50 (BP Location: Left arm, Patient Position: Chair, Cuff Size: Adult Regular)   Pulse 98   Temp 96.8  F (36  C) (Oral)   Wt 64.4 kg (141 lb 14.4 oz)   SpO2 97%   BMI 30.71 kg/m     ECOG performance status of 1. Living independently at home.  GENERAL: Well-nourished healthy-appearing sitting in chair, no acute distress.   HEENT: No icterus, no pallor. Moist mucous membranes. Oropharynx is clear.   NECK: Supple, no JVD/LAD.  LUNGS: Clear to ausculation bilaterally, normal work of breathing.   CARDIOVASCULAR: Regular rate and rhythm, no murmurs, gallops or rubs.   ABDOMEN: Soft, nontender and nondistended, no palpable masses, bowel sounds present.  EXTREMITIES: No cyanosis, no clubbing, b/l LE edema improved.  NEUROLOGIC: No focal deficits, CN 2-12 intact.  LYMPH NODE EXAM: No palpable adenopathy - cervical, axillary or inguinal.     LABORATORY DATA:  Recent Labs   Lab Test 12/12/18  1050 11/15/18  0758 11/14/18  0632  09/11/18  0612 09/11/18  0045   WBC 7.0 10.0 11.3*   < > 12.3* 10.2   RBC 3.92 3.69* 3.71*   < > 3.65* 3.70*   HGB 10.5* 10.1*  "10.0*   < > 10.0* 10.3*   HCT 33.7* 33.0* 32.9*   < > 31.7* 32.6*   MCV 86 89 89   < > 87 88   MCH 26.8 27.4 27.0   < > 27.4 27.8   MCHC 31.2* 30.6* 30.4*   < > 31.5 31.6   RDW 14.9 15.8* 15.9*   < > 14.5 14.3    230 207   < > 202 214   NEUTROPHIL 65.4  --   --   --  80.0 81.4    < > = values in this interval not displayed.     Recent Labs   Lab Test 12/12/18  1050 11/15/18  0758 11/14/18  0632   * 135 136   POTASSIUM 4.5 4.7 4.7   CHLORIDE 99 104 104   CO2 25 28 27   ANIONGAP 9 3 6   * 94 98   BUN 34* 13 17   CR 1.16* 1.05* 1.00   SHREYA 8.9 8.2* 8.2*     Recent Labs   Lab Test 12/12/18  1050 09/11/18  0612 07/16/18  0846   PROTTOTAL 7.3 6.5* 6.6*   ALBUMIN 3.0* 3.1* 3.1*   BILITOTAL 0.3 0.7 0.5   ALKPHOS 95 129 84   AST 20 103* 24   ALT 20 92* 32     IMAGING STUDIES:  PET/CT 12/11/18: \"There is a seroma in the surgical fossa. FDG avid disease in theretroperitoneum adjacent to surgical clips and retrocaval andinteraortocaval lymphadenopathy. FDG avid focus right posterolateralto urinary bladder might represent another node.\"    ASSESSMENT AND PLAN: Ms. 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   - I discussed the PET/CT scan report with patient and family. Unfortunately, but not unexpectedly, it is highly suspicious for significant residual lymph node disease burden. We discussed the implications of this finding in that it is very unlikely that we will be able to cure her cancer given the extensive spread.   - We had previously discussed adjuvant chemotherapy per POUT trial, but these findings make it very likely that chemotherapy is now going to be of palliative intent.  - She and her family understand these points and want to proceed with chemotherapy. They've had a couple of weeks to read and understand " the regimen and side effect profile of carboplatin and gemcitabine.  - Patient is independent and living by self. No clear contraindications for cisplatin based therapy but she has a long list of comorbidities that are expected diminish her ability to tolerate chemotherapy. She also has multiple chronic cardiac issues but recent ECHO (9/10/18) showed LVEF of 55-60%.  - I reviewed the side effect of cisplatin/carboplatin and gemcitabine chemotherapy at length including fatigue, nausea, vomiting, diarrhea, myelosuppression, nephropathy, neuropathy, and other side effects including second malignacies etc.    - Port has been placed.  - Plan for carboplatin 5AUC IV on day 1 with gemcitabine 1000mg/m2 IV on days 1 and 8 of each 3 week cycle for 4-6 cycles. Restaging scan (CT without contrast to protect renal function) after every 2 cycles.  - Start cycle 1 day 1 of chemo today.   All of the above was explained to the patient and family in lay language and several questions were answered. They are in agreement with the plan.  BILLIN.  Jaden Toledo M.D.  . Professor of Medicine  Genitourinary Oncology  Division of Hematology, Oncology & Transplantation  [facility]

## 2018-12-12 NOTE — LETTER
"12/12/2018       RE: Dimple Oviedo  [address]  [facility] [address]     Dear Colleague,    Thank you for referring your patient, Dimple Oviedo, to the [facility]. Please see a copy of my visit note below.    CLINICAL NUTRITION SERVICES - ASSESSMENT NOTE    Dimple Oviedo 85 year old referred for MNT related to urothelial cancer    Time Spent: 30 minutes  Visit Type:  initial  Referring Physician:  Toledo - pt request per onc screening  Pt accompanied by:  her sister-in-law, Day    NUTRITION HISTORY  Factors affecting nutrition intake include:taste aversions, low sodium diet  Current diet: low sodium  Current appetite/intake: reed Musa has been struggling with food choices with her low sodium diet and taste changes.   She tends to eat 3 meals/day and snacks on fruit if desired.    She avoids a lot of dairy products as they are typically high in sodium.    She drinks 1 Boost high protein every other day.      Diet Recall  Breakfast Frosted flakes with 1% milk or oatmeal with walnuts and craisins   Lunch [place] with tuna or turkey, hb egg or pnb toast and yogurt    Dinner 3 oz meat (pork, beef, chicken or fish), sweet potato and vegetable   Snacks fruit   Beverages >8 cups water/day, 1 Boost HP   Alcohol No   Dining out Seldom     ANTHROPOMETRICS  Height: 4'9\"  Weight: 140 lbs/64kg  BMI: 30.71  Weight Status:  Obesity Grade I BMI 30-34.9  IBW: 100 lbs  % IBW: 140%  Weight History: stable   Wt Readings from Last 6 Encounters:   12/12/18 64.4 kg (141 lb 14.4 oz)   12/04/18 64.1 kg (141 lb 6.4 oz)   11/27/18 64 kg (141 lb 3.2 oz)   11/26/18 64 kg (141 lb)   11/13/18 63.7 kg (140 lb 6.9 oz)   11/01/18 63.8 kg (140 lb 11.2 oz)       Dosing Weight: 50kg      Medications/vitamins/minerals/herbals:   Reviewed    LABS  Labs reviewed    ASSESSED NUTRITION NEEDS:  Estimated Energy Needs: 1500 kcals (30 Kcal/Kg)  Justification: maintenance  Estimated Protein Needs: 60 grams " protein (1.2 g pro/Kg)  Justification: maintenance  Estimated Fluid Needs: 2000  mL   Justification: increased needs with chemo    NUTRITION DIAGNOSIS:  Food and nutrition-related knowledge deficit related to nutrition needs with chemo as evidenced by pt with questions regarding foods to avoid and foods to consume with treatment    INTERVENTIONS  Nutrition Education     Provided written & verbal education:  Reviewed ways to optimize kcal and protein intake. Discussed calorie and protein needs for maintenance of weight and nutrition status throughout treatment.  Advised pt to aim for at least 1500kcal and 60g protein via 5-6 small frequent meals.  Reviewed foods to avoid with neutropenia.  Reviewed Mediterranean diet.   Reviewed ONS options.     Provided pt with corresponding education materials/handouts on:  Protein Sources  Coupons for ONS provided     Goals  1.  Aim for 5-6 small frequent meals, low sodium diet  2.  Aim for 1500kcal and 60g protein/day  3. Weight maintenance through cancer treatment      Follow-Up Plans: Pt has RD contact information for questions.      Tonie Hamm RD, LD

## 2018-12-12 NOTE — PROGRESS NOTES
"CLINICAL NUTRITION SERVICES - ASSESSMENT NOTE    Dimple Oviedo 85 year old referred for MNT related to urothelial cancer    Time Spent: 30 minutes  Visit Type: initial  Referring Physician: Tre - pt request per onc screening  Pt accompanied by: her sister-in-law, Day    NUTRITION HISTORY  Factors affecting nutrition intake include:taste aversions, low sodium diet  Current diet: low sodium  Current appetite/intake: reed Musa has been struggling with food choices with her low sodium diet and taste changes.   She tends to eat 3 meals/day and snacks on fruit if desired.    She avoids a lot of dairy products as they are typically high in sodium.    She drinks 1 Boost high protein every other day.      Diet Recall  Breakfast Frosted flakes with 1% milk or oatmeal with walnuts and craisins   Lunch [place] with tuna or turkey, hb egg or pnb toast and yogurt    Dinner 3 oz meat (pork, beef, chicken or fish), sweet potato and vegetable   Snacks fruit   Beverages >8 cups water/day, 1 Boost HP   Alcohol No   Dining out Seldom     ANTHROPOMETRICS  Height: 4'9\"  Weight: 140 lbs/64kg  BMI: 30.71  Weight Status:  Obesity Grade I BMI 30-34.9  IBW: 100 lbs  % IBW: 140%  Weight History: stable   Wt Readings from Last 6 Encounters:   12/12/18 64.4 kg (141 lb 14.4 oz)   12/04/18 64.1 kg (141 lb 6.4 oz)   11/27/18 64 kg (141 lb 3.2 oz)   11/26/18 64 kg (141 lb)   11/13/18 63.7 kg (140 lb 6.9 oz)   11/01/18 63.8 kg (140 lb 11.2 oz)       Dosing Weight: 50kg      Medications/vitamins/minerals/herbals:   Reviewed    LABS  Labs reviewed    ASSESSED NUTRITION NEEDS:  Estimated Energy Needs: 1500 kcals (30 Kcal/Kg)  Justification: maintenance  Estimated Protein Needs: 60 grams protein (1.2 g pro/Kg)  Justification: maintenance  Estimated Fluid Needs: 2000  mL   Justification: increased needs with chemo    NUTRITION DIAGNOSIS:  Food and nutrition-related knowledge deficit related to nutrition needs with chemo as evidenced by pt with " questions regarding foods to avoid and foods to consume with treatment    INTERVENTIONS  Nutrition Education     Provided written & verbal education:  Reviewed ways to optimize kcal and protein intake. Discussed calorie and protein needs for maintenance of weight and nutrition status throughout treatment.  Advised pt to aim for at least 1500kcal and 60g protein via 5-6 small frequent meals.  Reviewed foods to avoid with neutropenia.  Reviewed Mediterranean diet.   Reviewed ONS options.     Provided pt with corresponding education materials/handouts on:  Protein Sources  Coupons for ONS provided     Goals  1.  Aim for 5-6 small frequent meals, low sodium diet  2.  Aim for 1500kcal and 60g protein/day  3. Weight maintenance through cancer treatment      Follow-Up Plans: Pt has RD contact information for questions.      Tonie Hamm RD, LD

## 2018-12-18 ENCOUNTER — ANESTHESIA EVENT (OUTPATIENT)
Dept: SURGERY | Facility: AMBULATORY SURGERY CENTER | Age: 83
End: 2018-12-18

## 2018-12-19 ENCOUNTER — ANESTHESIA (OUTPATIENT)
Dept: SURGERY | Facility: AMBULATORY SURGERY CENTER | Age: 83
End: 2018-12-19

## 2018-12-19 ENCOUNTER — INFUSION THERAPY VISIT (OUTPATIENT)
Dept: ONCOLOGY | Facility: CLINIC | Age: 83
End: 2018-12-19
Attending: INTERNAL MEDICINE
Payer: MEDICARE

## 2018-12-19 ENCOUNTER — ANCILLARY PROCEDURE (OUTPATIENT)
Dept: RADIOLOGY | Facility: AMBULATORY SURGERY CENTER | Age: 83
End: 2018-12-19
Payer: MEDICARE

## 2018-12-19 ENCOUNTER — HOSPITAL ENCOUNTER (OUTPATIENT)
Facility: AMBULATORY SURGERY CENTER | Age: 83
End: 2018-12-19
Attending: PHYSICIAN ASSISTANT
Payer: MEDICARE

## 2018-12-19 VITALS
SYSTOLIC BLOOD PRESSURE: 147 MMHG | BODY MASS INDEX: 30.45 KG/M2 | TEMPERATURE: 97.7 F | HEIGHT: 57 IN | RESPIRATION RATE: 16 BRPM | OXYGEN SATURATION: 98 % | DIASTOLIC BLOOD PRESSURE: 58 MMHG | WEIGHT: 141.14 LBS

## 2018-12-19 VITALS
WEIGHT: 141.9 LBS | TEMPERATURE: 97.6 F | BODY MASS INDEX: 30.71 KG/M2 | RESPIRATION RATE: 16 BRPM | DIASTOLIC BLOOD PRESSURE: 65 MMHG | HEART RATE: 54 BPM | OXYGEN SATURATION: 100 % | SYSTOLIC BLOOD PRESSURE: 162 MMHG

## 2018-12-19 DIAGNOSIS — C68.9 UROTHELIAL CARCINOMA (H): ICD-10-CM

## 2018-12-19 DIAGNOSIS — C66.1 UROTHELIAL CARCINOMA OF RIGHT DISTAL URETER (H): ICD-10-CM

## 2018-12-19 DIAGNOSIS — E05.00 GRAVES DISEASE: ICD-10-CM

## 2018-12-19 DIAGNOSIS — C66.1 UROTHELIAL CARCINOMA OF RIGHT DISTAL URETER (H): Primary | ICD-10-CM

## 2018-12-19 LAB
BASOPHILS # BLD AUTO: 0 10E9/L (ref 0–0.2)
BASOPHILS NFR BLD AUTO: 1.6 %
DIFFERENTIAL METHOD BLD: ABNORMAL
EOSINOPHIL # BLD AUTO: 0.1 10E9/L (ref 0–0.7)
EOSINOPHIL NFR BLD AUTO: 3.2 %
ERYTHROCYTE [DISTWIDTH] IN BLOOD BY AUTOMATED COUNT: 14.3 % (ref 10–15)
HCT VFR BLD AUTO: 29.9 % (ref 35–47)
HGB BLD-MCNC: 9.4 G/DL (ref 11.7–15.7)
IMM GRANULOCYTES # BLD: 0 10E9/L (ref 0–0.4)
IMM GRANULOCYTES NFR BLD: 0.4 %
INR PPP: 0.91 (ref 0.86–1.14)
LYMPHOCYTES # BLD AUTO: 0.9 10E9/L (ref 0.8–5.3)
LYMPHOCYTES NFR BLD AUTO: 34.7 %
MCH RBC QN AUTO: 26.9 PG (ref 26.5–33)
MCHC RBC AUTO-ENTMCNC: 31.4 G/DL (ref 31.5–36.5)
MCV RBC AUTO: 86 FL (ref 78–100)
MONOCYTES # BLD AUTO: 0.1 10E9/L (ref 0–1.3)
MONOCYTES NFR BLD AUTO: 5.2 %
NEUTROPHILS # BLD AUTO: 1.4 10E9/L (ref 1.6–8.3)
NEUTROPHILS NFR BLD AUTO: 54.9 %
NRBC # BLD AUTO: 0 10*3/UL
NRBC BLD AUTO-RTO: 0 /100
PLATELET # BLD AUTO: 179 10E9/L (ref 150–450)
RBC # BLD AUTO: 3.49 10E12/L (ref 3.8–5.2)
T4 FREE SERPL-MCNC: 0.9 NG/DL (ref 0.76–1.46)
TSH SERPL DL<=0.005 MIU/L-ACNC: <0.01 MU/L (ref 0.4–4)
WBC # BLD AUTO: 2.5 10E9/L (ref 4–11)

## 2018-12-19 PROCEDURE — 96413 CHEMO IV INFUSION 1 HR: CPT

## 2018-12-19 PROCEDURE — 84480 ASSAY TRIIODOTHYRONINE (T3): CPT | Performed by: INTERNAL MEDICINE

## 2018-12-19 PROCEDURE — 96375 TX/PRO/DX INJ NEW DRUG ADDON: CPT

## 2018-12-19 PROCEDURE — 85025 COMPLETE CBC W/AUTO DIFF WBC: CPT | Performed by: INTERNAL MEDICINE

## 2018-12-19 PROCEDURE — 84439 ASSAY OF FREE THYROXINE: CPT | Performed by: INTERNAL MEDICINE

## 2018-12-19 PROCEDURE — 25000128 H RX IP 250 OP 636: Mod: ZF | Performed by: INTERNAL MEDICINE

## 2018-12-19 PROCEDURE — 84443 ASSAY THYROID STIM HORMONE: CPT | Performed by: INTERNAL MEDICINE

## 2018-12-19 DEVICE — CATH PORT POWERPORT CLEARVUE SLIM 6FR 5616000
Type: IMPLANTABLE DEVICE | Site: CHEST  WALL | Status: NON-FUNCTIONAL
Removed: 2019-06-26

## 2018-12-19 RX ORDER — NICOTINE POLACRILEX 4 MG
15-30 LOZENGE BUCCAL
Status: DISCONTINUED | OUTPATIENT
Start: 2018-12-19 | End: 2018-12-20 | Stop reason: HOSPADM

## 2018-12-19 RX ORDER — PROPOFOL 10 MG/ML
INJECTION, EMULSION INTRAVENOUS PRN
Status: DISCONTINUED | OUTPATIENT
Start: 2018-12-19 | End: 2018-12-19

## 2018-12-19 RX ORDER — LIDOCAINE 40 MG/G
CREAM TOPICAL
Status: DISCONTINUED | OUTPATIENT
Start: 2018-12-19 | End: 2018-12-20 | Stop reason: HOSPADM

## 2018-12-19 RX ORDER — MEPERIDINE HYDROCHLORIDE 25 MG/ML
12.5 INJECTION INTRAMUSCULAR; INTRAVENOUS; SUBCUTANEOUS
Status: DISCONTINUED | OUTPATIENT
Start: 2018-12-19 | End: 2018-12-20 | Stop reason: HOSPADM

## 2018-12-19 RX ORDER — DEXTROSE MONOHYDRATE 25 G/50ML
25-50 INJECTION, SOLUTION INTRAVENOUS
Status: DISCONTINUED | OUTPATIENT
Start: 2018-12-19 | End: 2018-12-20 | Stop reason: HOSPADM

## 2018-12-19 RX ORDER — ONDANSETRON 2 MG/ML
4 INJECTION INTRAMUSCULAR; INTRAVENOUS EVERY 30 MIN PRN
Status: DISCONTINUED | OUTPATIENT
Start: 2018-12-19 | End: 2018-12-20 | Stop reason: HOSPADM

## 2018-12-19 RX ORDER — HEPARIN SODIUM,PORCINE 10 UNIT/ML
VIAL (ML) INTRAVENOUS PRN
Status: DISCONTINUED | OUTPATIENT
Start: 2018-12-19 | End: 2018-12-19 | Stop reason: HOSPADM

## 2018-12-19 RX ORDER — ONDANSETRON 4 MG/1
4 TABLET, ORALLY DISINTEGRATING ORAL EVERY 30 MIN PRN
Status: DISCONTINUED | OUTPATIENT
Start: 2018-12-19 | End: 2018-12-20 | Stop reason: HOSPADM

## 2018-12-19 RX ORDER — CEFAZOLIN SODIUM 2 G/50ML
2 SOLUTION INTRAVENOUS
Status: COMPLETED | OUTPATIENT
Start: 2018-12-19 | End: 2018-12-19

## 2018-12-19 RX ORDER — SODIUM CHLORIDE 9 MG/ML
INJECTION, SOLUTION INTRAVENOUS CONTINUOUS
Status: DISCONTINUED | OUTPATIENT
Start: 2018-12-19 | End: 2018-12-20 | Stop reason: HOSPADM

## 2018-12-19 RX ORDER — LIDOCAINE HYDROCHLORIDE 20 MG/ML
INJECTION, SOLUTION INFILTRATION; PERINEURAL PRN
Status: DISCONTINUED | OUTPATIENT
Start: 2018-12-19 | End: 2018-12-19

## 2018-12-19 RX ORDER — HEPARIN SODIUM (PORCINE) LOCK FLUSH IV SOLN 100 UNIT/ML 100 UNIT/ML
5 SOLUTION INTRAVENOUS ONCE
Status: COMPLETED | OUTPATIENT
Start: 2018-12-19 | End: 2018-12-19

## 2018-12-19 RX ORDER — FENTANYL CITRATE 50 UG/ML
25-50 INJECTION, SOLUTION INTRAMUSCULAR; INTRAVENOUS
Status: DISCONTINUED | OUTPATIENT
Start: 2018-12-19 | End: 2018-12-20 | Stop reason: HOSPADM

## 2018-12-19 RX ORDER — PROPOFOL 10 MG/ML
INJECTION, EMULSION INTRAVENOUS CONTINUOUS PRN
Status: DISCONTINUED | OUTPATIENT
Start: 2018-12-19 | End: 2018-12-19

## 2018-12-19 RX ORDER — NALOXONE HYDROCHLORIDE 0.4 MG/ML
.1-.4 INJECTION, SOLUTION INTRAMUSCULAR; INTRAVENOUS; SUBCUTANEOUS
Status: DISCONTINUED | OUTPATIENT
Start: 2018-12-19 | End: 2018-12-20 | Stop reason: HOSPADM

## 2018-12-19 RX ORDER — SODIUM CHLORIDE, SODIUM LACTATE, POTASSIUM CHLORIDE, CALCIUM CHLORIDE 600; 310; 30; 20 MG/100ML; MG/100ML; MG/100ML; MG/100ML
INJECTION, SOLUTION INTRAVENOUS CONTINUOUS
Status: DISCONTINUED | OUTPATIENT
Start: 2018-12-19 | End: 2018-12-20 | Stop reason: HOSPADM

## 2018-12-19 RX ORDER — ACETAMINOPHEN 325 MG/1
975 TABLET ORAL ONCE
Status: COMPLETED | OUTPATIENT
Start: 2018-12-19 | End: 2018-12-19

## 2018-12-19 RX ORDER — HEPARIN SODIUM,PORCINE 10 UNIT/ML
5 VIAL (ML) INTRAVENOUS EVERY 24 HOURS
Status: DISCONTINUED | OUTPATIENT
Start: 2018-12-19 | End: 2018-12-20 | Stop reason: HOSPADM

## 2018-12-19 RX ORDER — HEPARIN SODIUM (PORCINE) LOCK FLUSH IV SOLN 100 UNIT/ML 100 UNIT/ML
5 SOLUTION INTRAVENOUS
Status: DISCONTINUED | OUTPATIENT
Start: 2018-12-19 | End: 2018-12-20 | Stop reason: HOSPADM

## 2018-12-19 RX ADMIN — SODIUM CHLORIDE, PRESERVATIVE FREE 5 ML: 5 INJECTION INTRAVENOUS at 11:08

## 2018-12-19 RX ADMIN — PROPOFOL 30 MG: 10 INJECTION, EMULSION INTRAVENOUS at 07:43

## 2018-12-19 RX ADMIN — ACETAMINOPHEN 975 MG: 325 TABLET ORAL at 06:43

## 2018-12-19 RX ADMIN — CEFAZOLIN SODIUM 2 G: 2 SOLUTION INTRAVENOUS at 07:12

## 2018-12-19 RX ADMIN — SODIUM CHLORIDE, SODIUM LACTATE, POTASSIUM CHLORIDE, CALCIUM CHLORIDE: 600; 310; 30; 20 INJECTION, SOLUTION INTRAVENOUS at 06:42

## 2018-12-19 RX ADMIN — GEMCITABINE 1600 MG: 38 INJECTION, SOLUTION INTRAVENOUS at 10:34

## 2018-12-19 RX ADMIN — LIDOCAINE HYDROCHLORIDE 60 MG: 20 INJECTION, SOLUTION INFILTRATION; PERINEURAL at 07:15

## 2018-12-19 RX ADMIN — PROPOFOL 100 MCG/KG/MIN: 10 INJECTION, EMULSION INTRAVENOUS at 07:15

## 2018-12-19 RX ADMIN — SODIUM CHLORIDE 250 ML: 9 INJECTION, SOLUTION INTRAVENOUS at 10:03

## 2018-12-19 RX ADMIN — DEXAMETHASONE SODIUM PHOSPHATE 12 MG: 10 INJECTION, SOLUTION INTRAMUSCULAR; INTRAVENOUS at 10:03

## 2018-12-19 ASSESSMENT — PAIN SCALES - GENERAL: PAINLEVEL: NO PAIN (0)

## 2018-12-19 ASSESSMENT — LIFESTYLE VARIABLES: TOBACCO_USE: 0

## 2018-12-19 ASSESSMENT — MIFFLIN-ST. JEOR: SCORE: 959.09

## 2018-12-19 ASSESSMENT — ENCOUNTER SYMPTOMS: DYSRHYTHMIAS: 1

## 2018-12-19 NOTE — ANESTHESIA POSTPROCEDURE EVALUATION
Anesthesia POST Procedure Evaluation    Patient: Dimple Oviedo   MRN:     [id number] Gender:   female   Age:    85 year old :      1933        Preoperative Diagnosis: Ureteral Carcinoma of Right Distal Ureter   Procedure(s):  Chest Port Placement - right   Postop Comments: No value filed.       Anesthesia Type:  MAC    Reportable Event: NO     PAIN: Uncomplicated   Sign Out status: Comfortable, Well controlled pain     PONV: No PONV   Sign Out status:  No Nausea or Vomiting     Neuro/Psych: Uneventful perioperative course   Sign Out Status: Preoperative baseline; Age appropriate mentation     Airway/Resp.: Uneventful perioperative course   Sign Out Status: Non labored breathing, age appropriate RR; Resp. Status within EXPECTED Parameters     CV: Uneventful perioperative course   Sign Out status: Appropriate BP and perfusion indices; Appropriate HR/Rhythm     Disposition:   Sign Out in:  Phase II  Disposition:  Home  Recovery Course: Uneventful  Follow-Up: Not required           Last Anesthesia Record Vitals:  CRNA VITALS  2018 0741 - 2018 0834      2018             Resp Rate (set):  10          Last PACU/Preop Vitals:  Vitals:    18 0630 18 0814   BP: 125/54 144/58   Resp: 16 16   Temp: 36.8  C (98.3  F) 36.5  C (97.7  F)   SpO2: 97% 98%         Electronically Signed By: Yann Carty MD, 2018, 8:34 AM

## 2018-12-19 NOTE — PROGRESS NOTES
"SPIRITUAL HEALTH SERVICES  SPIRITUAL ASSESSMENT Progress Note  [facility]      REFERRAL SOURCE:      PRIMARY FOCUS:     Goals of care    Symptom/pain management    Emotional/spiritual/Gnosticist distress    Support for coping     ILLNESS CIRCUMSTANCES:   Reviewed documentation. Reflective conversation shared with Dimple and her daughter-in-law Day which integrated elements of illness and family narratives.     Context of Serious Illness/Symptom(s) - After living 85 years with virtually no health problems, Dimple was diagnosed with stage IV cancer last month. She understands from her doctor that the cancer \"cannot be cured.\" She is proceeding with treatment cautiously. She does not want to prolong treatment if her quality of life is diminished beyond a certain point.    Resources for Support - Son Issa and daughter-in-law Day are based locally. Dimple is currently staying with them and they have transported her to  60+ appointments  since she first became ill last December. Her daughter Nidia, son-in-law, and other family are all based on [facility]. Dimple has a strong tie to her community at the [facility], where she has been living until recently. She also has support from her sal community, [facility] in [place].     DISTRESS:     Emotional/Spiritual/Existential Distress - Dimple does not want to continue with treatment if, for example, she gets to the point where she  needs a feeding tube.\" She has been clear about this with her providers and Lisa. During our conversation today, Day expressed some concern that Dimple has not articulated these quality-of-life boundaries to her daughter Nidia. Day is encouraging Dimple to have a more direct conversation with Nidia when she comes to [place] in January.    Temple Distress - not discussed    Social/Cultural/Economic Distress - Being removed from " her senior living community has been very upsetting to Dimple, who is very social and thrives on community interaction (she is an avid ). She uncomfortable having to rely so heavily on Day and Issa, citing  all the wear and tear  she is putting on their car simply due to transportation to appointments.     SPIRITUAL/Spiritism COPING:     Mu-ism/Doris -Dimple is clear that she is not afraid to die. She knows that she will be reunited with those she has lost, including her  who  of prostate cancer in .    Spiritual Practice(s) - Prayer, Jainism.    Emotional/Relational/Existential Connections - Strong family / friend connections and a deep doris     GOALS OF CARE:    Goals of Care - Dimple s goals of care are to manager her disease while maintaining her quality of life standard.    Meaning/Sense-Making - Before we prayed, I told Dimple I would pray for her and her family - when I asked if there was anyone else I could pray for she said,  No, they are my whole life.  Cullen s sense of meaning comes from her experience of participating actively in her life and the lives of her family. She feels blessed and is not afraid of dying.       PLAN: Cullen welcomes  visits at future appointments, so either Yoko Luna or I will follow up.        Christoph Kauffman MDiv  Chaplain Resident  Pager [phone]  Cell [phone]

## 2018-12-19 NOTE — PROCEDURES
Interventional Radiology Brief Post Procedure Note    Procedure:  IR CHEST PORT PLACEMENT > 5 YRS OF AGE [[id number]]    Proceduralist: MELANIE Justice PA-C    Assistant: None    Time Out: Prior to the start of the procedure and with procedural staff participation, I verbally confirmed the patient s identity using two indicators, relevant allergies, that the procedure was appropriate and matched the consent or emergent situation, and that the correct equipment/implants were available. Immediately prior to starting the procedure I conducted the Time Out with the procedural staff and re-confirmed the patient s name, procedure, and site/side. (The Joint Commission universal protocol was followed.)  Yes        Sedation: Monitored Anesthesia Care (MAC) administered and documented by Anesthesia Care Provider    Findings: Completed placement of a 6 Romanian, 23 cm, Bard ClearVUE Slim brand, single lumen venous chest  power-injectable port via RIJV.  Tip lying in the right atrium. PORT is ready for immediate use.  Dx:  Urothelial carcinoma of right distal ureter.  Kathy.  MAC  <1    Estimated Blood Loss: Minimal    Fluoroscopy Time:  minute(s)    SPECIMENS: None    Complications: 1. None     Condition: Stable    Plan:     Comments: See dictated procedure note for full details.    Stuart Chavez PA-C

## 2018-12-19 NOTE — PATIENT INSTRUCTIONS
Patient Education     Neutropenia  White blood cells (WBCs) help protect the body from infection. Neutrophils are a type of white blood cell. Their main job is to help the body fight bacterial and fungal infections. Neutropenia occurs when there are fewer neutrophils in the blood than normal. It can range from mild to severe. This depends on the number of neutrophils in the blood. Severe neutropenia puts a person at higher risk for having more infections. Bacterial and fungal infections are most common. Your doctor can tell you more about your condition and whether it needs to be treated.    What causes neutropenia?  There are 2 main types of neutropenia: congenital and acquired. Each type has many causes:    Congenital neutropenia. These are the types that are present at birth. They are caused by certain rare genetic conditions, such as Kostmann s syndrome. Most often the neurtropenia is mild and normal for certain ethnic groups.    Acquired neutropenia. This type is not present at birth. Causes include:  ? Certain medicines, such as antibiotics and chemotherapy medicines  ? Certain autoimmune conditions  ? Certain viral, bacterial, or parasitic infections  ? Too little folate or vitamin B-12 in the diet  ? Underlying bone marrow problem, such as leukemia or myelodysplastic syndrome (MDS)  ? Other causes  How is neutropenia diagnosed?  Your healthcare provider may check for neutropenia if you have frequent infections. Your provider may also check for neutropenia if you re having certain treatments, such as chemotherapy, which is known to cause a lower neutrophil count. Tests will be done to confirm the problem. These may include:    A complete blood cell count (CBC). This test measures the amounts of the different types of cells in your blood. This includes the WBCs. The WBC count can be broken down further to find the number of neutrophils and immature neutrophils (bands) in your blood. This is called an  absolute neutrophil count (ANC).    A blood smear. This test checks for the different types of blood cells in your blood and how they appear. A sample of your blood is spread on a glass slide and viewed under a microscope. A stain is used so the blood cells can be seen.    A bone marrow aspiration and biopsy. This test checks for problems with how your bone marrow makes blood cells. A needle is used to remove a sample of the bone marrow in your hipbone. The sample is then sent to a lab to be tested for problems.  How is neutropenia treated?    If there is a clear cause of neutropenia, it is addressed. For instance, if a medicine is the cause, it may be stopped or changed.    For mild cases, often no treatment is needed.    For moderate to severe cases, treatment is likely needed. This may include:  ? G-CSF (granulocyte-colony stimulating factor). This is a special type of protein. It helps promote the growth and activity of neutrophils. G-CSF is given by injection.  ? Bone marrow transplant. This treatment replaces diseased bone marrow cells with healthy cells from a matched donor. This treatment is only done in specific severe cases.  What is the long-term outcome of neutropenia?  The outcome of neutropenia varies for each person. For some people, neutropenia may resolve after a few weeks or months. For other people, it may be long-lasting. In these cases, ongoing care and treatment is needed. Your healthcare provider will talk to you more about what to expect from your condition.  When to call your healthcare provider  Call your healthcare provider right away if you have any of the following:    Fever of 100.4 F (38 C) or higher. Call 911 or 24-hour urgent care. This is especially important if you have severe neutropenia, which puts you at higher risk for life-threatening infection.    Cold sweat or chills    Chest pain or trouble breathing    Sore throat    Extreme tiredness or fatigue    Nausea and  vomiting    Redness, warmth, or drainage from any open cuts or wounds    Pain or burning with urination; frequent urination    Pain, burning, or bleeding in the rectum    Severe constipation or diarrhea    Bloody stool or urine    How can I prevent infections?  With neutropenia, you need to take extra care to protect yourself from infection. Be sure to:    Wash your hands often, especially before eating and after using the bathroom. Use warm water and soap. Or use a hand gel that contains at least 60% alcohol.    Avoid close contact with others who may be ill.    Clean items you use often with disinfectant wipes. This includes phones and computer keyboards.    Avoid touching your eyes, nose, and mouth, especially if your hands are not clean.    Practice good oral hygiene. Use a soft toothbrush. Also, brush and floss your teeth gently.    Always wipe from front to back after a bowel movement.    Keep cuts and scrapes clean and covered until they heal.    Avoid sharing items such as towels, toothbrushes, razors, clothing, and sports equipment.    Store and handle foods safely to prevent food-borne illness.    Ask your healthcare provider if you need to take antibiotics before and after having any dental or medical procedures.    Ask your healthcare provider if you need to wear a special mask near construction sites or farm areas.  Date Last Reviewed: 12/1/2016 2000-2018 [employer]. [address] [place] [address], [place] [address]. All rights reserved. This information is not intended as a substitute for professional medical care. Always follow your healthcare professional's instructions.

## 2018-12-19 NOTE — PROGRESS NOTES
Infusion Nursing Note:  Dimple Oviedo presents today for C1D8 Gemicitabine.    Patient seen by provider today: No   present during visit today: Not Applicable.    Note: Pt received Gemcitabine over 30 minutes.  Pre-medicated with IV Dexamethasone.  Pt tolerated without incident.  Neutropenic teaching reviewed (and teaching sheet copied to AVS) d/t patient's borderline low neutrophils.  Pt and daughter able to verbally return warning signs (fever of 100.4, shaking chills or feeling cold, dizziness, other signs of infection) of when to call provider.  Pt tolerated all infusions well.    Intravenous Access:  Implanted Port.    Treatment Conditions:  Lab Results   Component Value Date    HGB 9.4 12/19/2018     Lab Results   Component Value Date    WBC 2.5 12/19/2018      Lab Results   Component Value Date    ANEU 1.4 12/19/2018     Lab Results   Component Value Date     12/19/2018      Results reviewed, labs MET treatment parameters, ok to proceed with treatment.      Post Infusion Assessment:  Patient tolerated infusion without incident.  Blood return noted pre and post infusion.  Site patent and intact, free from redness, edema or discomfort.  No evidence of extravasations.  Access discontinued per protocol.    Discharge Plan:   Patient declined prescription refills.  Discharge instructions reviewed with: Patient and Family.  Patient and/or family verbalized understanding of discharge instructions and all questions answered.  Copy of AVS reviewed with patient and/or family.  Patient will return 01/02 for next appointment.  Patient discharged in stable condition accompanied by: self and daughter.  Departure Mode: Ambulatory.      Evelyn Smith RN

## 2018-12-19 NOTE — DISCHARGE INSTRUCTIONS
A collaboration between [facility] and [facility]  Experts in minimally invasive, targeted treatments performed using imaging guidance    Venous Access Device,  Port Catheter or Tunneled or Non-Tunneled Central Line Placement    Today you had a procedure today to install a venous access device; either a tunneled central vein catheter or a subcutaneous port catheter.    One of our Radiology PAs performed this procedure for you today:  ? Efren Cason PA-C  ? MELANIE Justice PA-C  ? Farhan Cole PA-C  ? Nunu Stover PA-C   ? Froilan Irizarry PA-C    After you go home:  - Drink plenty of fluids.  Generally 6-8 (8 ounce) glasses a day is recommended.  - Resume your regular diet unless otherwise ordered by a medical provider.  - Keep any applied tape/gauze dressings clean and dry.  Change tape/gauze dressings if they get wet or soiled.  - You may shower the following day after procedure, however cover and protect from moisture any tape/gauze dressings.  You may let water hit and run over dried skin glue, but do not scrub.  Pat the area dry after showering.  - Port placement incisions are closed with absorbable suture, meaning they do not need to be removed at a later date, and a topical skin adhesive (skin glue).  This glue will wear off in 7-14 days.  Do not remove before this time.  If 14 days have passed and residual glue is present, you may gently remove it.  - Do not apply gels, lotions, or ointments to the glue site for the first 10 days as this may cause the glue to prematurely soften and fail.  - Do not perform strenuous activities or lift greater than 10 pounds for the next three days.  - If there is bleeding or oozing from the procedure site, apply firm pressure to the area for 5-10 minutes.  If the bleeding continues seek medical advice at the numbers below.  - Mild procedure site discomfort can be treated with an ice pack and over-the-counter pain  relievers.        For 24 hours after any sedation used:  - Relax and take it easy.  No strenuous activities.  - Do not drive or operate machines at home or at work.  - No alcohol consumption.  - Do not make any important or legal decisions.    Call our Interventional Radiology (IR) service if:  - If you start bleeding from the procedure site.  If you do start to bleed from the site, lie down and hold some pressure on the site.  Our radiology provider can help you decide if you need to return to the hospital.  - If you have new or worsening pain related to the procedure.  - If you have concerning swelling at the procedure site.  - If you develop persistent nausea or vomiting.  - If you develop hives or a rash or any unexplained itching.  - If you have a fever of greater than 100.5  F and chills in the first 5 days after procedure.  - Any other concerns related to your procedure.      [facility]  Interventional Radiology (IR)  [address] [place] [address]    Contact Number:  [phone]  (IR control desk)  - Monday - Friday 8:00 am - 4:30 pm    After hours for urgent concerns:  [phone]  - After 4:30 pm Monday - Friday, Weekends and Holidays.   - Ask for Interventional Radiology on-call.  Someone is available 24 hours a day.  - [facility] toll free number:  [phone]

## 2018-12-19 NOTE — ANESTHESIA CARE TRANSFER NOTE
Patient: Dimple Oviedo    Procedure(s):  Chest Port Placement - right    Diagnosis: Ureteral Carcinoma of Right Distal Ureter  Diagnosis Additional Information: No value filed.    Anesthesia Type:   No value filed.     Note:  Airway :Room Air  Patient transferred to:Phase II  Handoff Report: Identifed the Patient, Identified the Reponsible Provider, Reviewed the pertinent medical history, Discussed the surgical course, Reviewed Intra-OP anesthesia mangement and issues during anesthesia, Set expectations for post-procedure period and Allowed opportunity for questions and acknowledgement of understanding      Vitals: (Last set prior to Anesthesia Care Transfer)    CRNA VITALS  12/19/2018 0741 - 12/19/2018 0818      12/19/2018             Resp Rate (set):  10                Electronically Signed By: NELSON Serrano CRNA  December 19, 2018  8:18 AM

## 2018-12-19 NOTE — ANESTHESIA PREPROCEDURE EVALUATION
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   Procedure(s):  Chest Port Placement - right     Past Medical History:   Diagnosis Date     Calculus of kidney      Esophageal reflux      GERD (gastroesophageal reflux disease)      Hyperlipidemia LDL goal <130 2010     Malignant melanoma of skin of trunk, except scrotum (H)      Nonspecific abnormal finding     has living will  -      Nontoxic multinodular goiter     no further eval /tx rec per pt     Osteopenia      Other psoriasis      Personal history of colonic polyps      PMR (polymyalgia rheumatica) (H)      Stress-induced cardiomyopathy      Undiagnosed cardiac murmurs      Unspecified constipation      Unspecified essential hypertension      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      Past Surgical History:   Procedure Laterality Date     BIOPSY       C NONSPECIFIC PROCEDURE      colonoscopy polyp repeat      COLONOSCOPY       COMBINED CYSTOSCOPY, INSERT STENT URETER(S) Bilateral 2018    Procedure: COMBINED CYSTOSCOPY, INSERT STENT URETER(S);  Cystoscopy Bilateral ureteral Stent Placement.;  Surgeon: Justin Henry MD;  [facility]     COMBINED CYSTOSCOPY, RETROGRADES, URETEROSCOPY, INSERT STENT Bilateral 4/3/2018    Procedure: COMBINED CYSTOSCOPY, RETROGRADES, URETEROSCOPY, INSERT STENT;;  Surgeon: Stuart King MD;  [facility]     COMBINED CYSTOSCOPY, RETROGRADES, URETEROSCOPY, INSERT STENT Bilateral 9/10/2018    Procedure: COMBINED CYSTOSCOPY, RETROGRADES, URETEROSCOPY, INSERT STENT;  Cystoscopy, Bilateral Ureteroscopy, Bladder Biopsies, Retrogram Pyelograms, Ureteral Washings and brushings, cysview;  Surgeon: Stuart King MD;  [facility]     CYSTOSCOPY, BIOPSY BLADDER INSTILL OPTICAL AGENT N/A 4/3/2018    Procedure:  CYSTOSCOPY, BIOPSY BLADDER INSTILL OPTICAL AGENT;  Cystoscopy, Blue Light Cystoscopy, Bladder Biopsies, Bilateral Selective ureteral washings for Cytology, Bilateral Retrograde Pyelograms, Bilateral Ureteroscopy;  Surgeon: Stuart King MD;  [facility]     CYSTOSCOPY, BIOPSY BLADDER, COMBINED N/A 2/19/2018    Procedure: COMBINED CYSTOSCOPY, BIOPSY BLADDER;  Cystoscopy, Bladder Biopsy;  Surgeon: Kenna La MD;  [facility]     CYSTOSCOPY, REMOVE STENT(S), COMBINED  11/13/2018    Procedure: Flexible Cystoscopy with Stent Removal;  Surgeon: Stuart King MD;  [facility]     DAVINCI LYMPHADENECTOMY N/A 11/13/2018    Procedure: Davinci Lymphadenectomy ;  Surgeon: Stuart King MD;  [facility]     DAVINCI NEPHROURETERECTOMY N/A 11/13/2018    Procedure: Right DaVinci Assisted Nephroureterectomy;  Surgeon: Stuart King MD;  [facility]     ENDOSCOPIC ULTRASOUND LOWER GASTROINTESTIONAL TRACT (GI) N/A 10/30/2015    Procedure: ENDOSCOPIC ULTRASOUND LOWER GASTROINTESTIONAL TRACT (GI);  Surgeon: Daniel Jean-Baptiste MD;  [facility]     EYE SURGERY  12/4/17     LAPAROSCOPIC CHOLECYSTECTOMY WITH CHOLANGIOGRAMS N/A 11/1/2015    Procedure: LAPAROSCOPIC CHOLECYSTECTOMY WITH CHOLANGIOGRAMS;  Surgeon: Tonie Warren MD;  [facility]     SURGICAL HISTORY OF -   1996    malignant melanoma     SURGICAL HISTORY OF -   1968    thyroid nodule     SURGICAL HISTORY OF -       D & C          Anesthesia Evaluation     . Pt has had prior anesthetic. Type: General    History of anesthetic complications   - PONV        ROS/MED HX    ENT/Pulmonary:     (+)JESS risk factors snores loudly, hypertension, , . .   (-) tobacco use   Neurologic:  - neg neurologic ROS   (+)other neuro tremors, RLS    Cardiovascular: Comment: CP episode 12/13/17 with elevated troponin. 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 Angiogram no CAD. F/U ECHO Chaitanya EF 65%.   AF with RVR  in setting of hyperthyroidism. Spontaneous conversion. Managed with B-blocker, ACEI, statin and ASA.   On Tapaxole.  last endocrine  visit 1/2018. Xarelto DC'd 2/2 bleeding.         (+) hypertension----. : . CHF etiology: Stress induced cardiomyopathy 12/13/17 Last EF: 55-60% date: 9/2018 . . :. dysrhythmias a-fib, valvular problems/murmurs type: AS and AI . Previous cardiac testing Echodate:9/2018results:Interpretation Summary  Left ventricular function, chamber size, wall motion, and wall thickness are  normal.The EF is 55-60%.  Right ventricular function, chamber size, wall motion, and thickness are  normal.  Severe left atrial enlargement is present.  Aortic valve poorly visualized but appears to have moderate calcification and  at least mild-moderate stenosis.  Mild to moderate aortic insufficiency is present.  IVC measures 2.47cm and collapses with inspiration. Estimated mean right  atrial pressure is 8 mmHg (mildly elevated).  No pericardial effusion is present.date: results:ECG reviewed date:9/27/18 results:SR, LADCath date: 12/2017 results:          METS/Exercise Tolerance: Comment: METS<4 1 - Eating, dressing   Hematologic:     (+) Anemia, -     (-) History of Transfusion   Musculoskeletal: Comment: Osteopenia    Osteoarthritis in knees bilaterally.  - neg musculoskeletal ROS       GI/Hepatic:     (+) GERD Asymptomatic on medication, cholecystitis/cholelithiasis (s/p cholecystectomy),       Renal/Genitourinary:     (+) chronic renal disease, type: CRI, Nephrolithiasis , Pt does not require dialysis, Pt has no history of transplant, Other Renal/ Genitourinary, Hematuria      Endo:     (+) thyroid problem (Graves disease)  hyperthyroidism, Obesity (BMI 31), .      Psychiatric:     (+) psychiatric history depression      Infectious Disease:  - neg infectious disease ROS       Malignancy:   (+) Malignancy History of Other  Skin CA status post Surgery, Other CA Urothelial cancer Active status post Surgery        "  Other:    (+) C-spine cleared: N/A, no H/O Chronic Pain,other significant disability Other (comment)                       PHYSICAL EXAM:   Mental Status/Neuro: A/A/O   Airway: Facies: Feasible  Mallampati: I  Mouth/Opening: Full  TM distance: > 6 cm  Neck ROM: Full   Respiratory: Auscultation: CTAB     Resp. Rate: Normal     Resp. Effort: Normal      CV: Rhythm: Regular  Rate: Age appropriate  Heart: Normal Sounds   Comments:      Dental: Normal                  Lab Results   Component Value Date    WBC 7.0 12/12/2018    HGB 10.5 (L) 12/12/2018    HCT 33.7 (L) 12/12/2018     12/12/2018    CRP <2.9 10/06/2017    SED 25 10/06/2017     (L) 12/12/2018    POTASSIUM 4.5 12/12/2018    CHLORIDE 99 12/12/2018    CO2 25 12/12/2018    BUN 34 (H) 12/12/2018    CR 1.16 (H) 12/12/2018     (H) 12/12/2018    SHREYA 8.9 12/12/2018    PHOS 2.4 (L) 12/13/2017    MAG 2.1 12/12/2018    ALBUMIN 3.0 (L) 12/12/2018    PROTTOTAL 7.3 12/12/2018    ALT 20 12/12/2018    AST 20 12/12/2018    ALKPHOS 95 12/12/2018    BILITOTAL 0.3 12/12/2018    LIPASE 100 10/31/2015    AMYLASE 44 10/29/2015    INR 0.91 12/19/2018    TSH <0.01 (L) 12/04/2018    T4 1.05 12/04/2018    T3 112 12/04/2018    HCGS Negative 12/13/2017       Preop Vitals  BP Readings from Last 3 Encounters:   12/19/18 125/54   12/12/18 124/50   12/04/18 138/74    Pulse Readings from Last 3 Encounters:   12/12/18 98   12/04/18 54   11/27/18 60      Resp Readings from Last 3 Encounters:   12/19/18 16   11/15/18 18   10/22/18 16    SpO2 Readings from Last 3 Encounters:   12/19/18 97%   12/12/18 97%   11/26/18 99%      Temp Readings from Last 1 Encounters:   12/19/18 36.8  C (98.3  F) (Oral)    Ht Readings from Last 1 Encounters:   12/19/18 1.448 m (4' 9\")      Wt Readings from Last 1 Encounters:   12/19/18 64 kg (141 lb 2.2 oz)    Estimated body mass index is 30.54 kg/m  as calculated from the following:    Height as of this encounter: 1.448 m (4' 9\").    Weight as of " this encounter: 64 kg (141 lb 2.2 oz).     LDA:  Peripheral IV 11/15/18 Left;Posterior Upper forearm (Active)   Number of days: 34       Peripheral IV 12/19/18 Left Hand (Active)   Site Assessment WDL 12/19/2018  6:32 AM   Line Status Infusing 12/19/2018  6:32 AM   Phlebitis Scale 0-->no symptoms 12/19/2018  6:32 AM   Infiltration Scale 0 12/19/2018  6:32 AM   Extravasation? No 12/19/2018  6:32 AM   Dressing Intervention New dressing  12/19/2018  6:32 AM   Number of days: 0       Urethral Catheter Double-lumen;Latex;Straight-tip;Silver coated 16 fr (Active)   Number of days: 36            Assessment:   ASA SCORE: 3    NPO Status: > 2 hours since completed Clear Liquids; > 6 hours since completed Solid Foods   Documentation: H&P complete   Proceeding: Proceed without further delay  Tobacco Use:  NO Active use of Tobacco/UNKNOWN Tobacco use status     Plan:   Anes. Type:  MAC      Induction:  IV (Standard)   Airway: Native Airway   Access/Monitoring: PIV   Maintenance: Propofol; IV   Emergence: Procedure Site   Logistics: Same Day Surgery     Postop Pain/Sedation Strategy:  Standard-Options: Opioids PRN     PONV Management:  Adult Risk Factors: Female, Non-Smoker, Postop Opioids  Prevention: Propofol Infusion; Ondansetron     CONSENT: Direct conversation   Plan and risks discussed with: Patient                            Yann Carty MD

## 2018-12-20 DIAGNOSIS — E05.00 GRAVES DISEASE: Primary | ICD-10-CM

## 2018-12-26 ENCOUNTER — APPOINTMENT (OUTPATIENT)
Dept: GENERAL RADIOLOGY | Facility: CLINIC | Age: 83
End: 2018-12-26
Attending: EMERGENCY MEDICINE
Payer: MEDICARE

## 2018-12-26 ENCOUNTER — TELEPHONE (OUTPATIENT)
Dept: ONCOLOGY | Facility: CLINIC | Age: 83
End: 2018-12-26

## 2018-12-26 ENCOUNTER — HOSPITAL ENCOUNTER (EMERGENCY)
Facility: CLINIC | Age: 83
Discharge: HOME | End: 2018-12-26
Attending: EMERGENCY MEDICINE | Admitting: EMERGENCY MEDICINE
Payer: MEDICARE

## 2018-12-26 VITALS
OXYGEN SATURATION: 100 % | TEMPERATURE: 98 F | WEIGHT: 142.8 LBS | BODY MASS INDEX: 30.81 KG/M2 | HEIGHT: 57 IN | DIASTOLIC BLOOD PRESSURE: 50 MMHG | HEART RATE: 69 BPM | SYSTOLIC BLOOD PRESSURE: 128 MMHG | RESPIRATION RATE: 16 BRPM

## 2018-12-26 DIAGNOSIS — R50.9 FEVER, UNSPECIFIED FEVER CAUSE: ICD-10-CM

## 2018-12-26 DIAGNOSIS — C68.9 UROTHELIAL CANCER (H): ICD-10-CM

## 2018-12-26 LAB
ALBUMIN SERPL-MCNC: 2.7 G/DL (ref 3.4–5)
ALBUMIN UR-MCNC: NEGATIVE MG/DL
ALP SERPL-CCNC: 100 U/L (ref 40–150)
ALT SERPL W P-5'-P-CCNC: 26 U/L (ref 0–50)
ANION GAP SERPL CALCULATED.3IONS-SCNC: 9 MMOL/L (ref 3–14)
ANISOCYTOSIS BLD QL SMEAR: SLIGHT
APPEARANCE UR: CLEAR
AST SERPL W P-5'-P-CCNC: 21 U/L (ref 0–45)
BASOPHILS # BLD AUTO: 0 10E9/L (ref 0–0.2)
BASOPHILS NFR BLD AUTO: 0 %
BILIRUB SERPL-MCNC: 0.4 MG/DL (ref 0.2–1.3)
BILIRUB UR QL STRIP: NEGATIVE
BUN SERPL-MCNC: 26 MG/DL (ref 7–30)
CALCIUM SERPL-MCNC: 8.3 MG/DL (ref 8.5–10.1)
CHLORIDE SERPL-SCNC: 100 MMOL/L (ref 94–109)
CO2 SERPL-SCNC: 25 MMOL/L (ref 20–32)
COLOR UR AUTO: ABNORMAL
CREAT SERPL-MCNC: 1.06 MG/DL (ref 0.52–1.04)
DIFFERENTIAL METHOD BLD: ABNORMAL
EOSINOPHIL # BLD AUTO: 0 10E9/L (ref 0–0.7)
EOSINOPHIL NFR BLD AUTO: 0 %
ERYTHROCYTE [DISTWIDTH] IN BLOOD BY AUTOMATED COUNT: 14.3 % (ref 10–15)
GFR SERPL CREATININE-BSD FRML MDRD: 48 ML/MIN/{1.73_M2}
GLUCOSE SERPL-MCNC: 117 MG/DL (ref 70–99)
GLUCOSE UR STRIP-MCNC: NEGATIVE MG/DL
HCT VFR BLD AUTO: 27.5 % (ref 35–47)
HGB BLD-MCNC: 8.6 G/DL (ref 11.7–15.7)
HGB UR QL STRIP: ABNORMAL
KETONES UR STRIP-MCNC: NEGATIVE MG/DL
LEUKOCYTE ESTERASE UR QL STRIP: NEGATIVE
LIPASE SERPL-CCNC: 91 U/L (ref 73–393)
LYMPHOCYTES # BLD AUTO: 0.5 10E9/L (ref 0.8–5.3)
LYMPHOCYTES NFR BLD AUTO: 19.1 %
MCH RBC QN AUTO: 26.9 PG (ref 26.5–33)
MCHC RBC AUTO-ENTMCNC: 31.3 G/DL (ref 31.5–36.5)
MCV RBC AUTO: 86 FL (ref 78–100)
MONOCYTES # BLD AUTO: 0.2 10E9/L (ref 0–1.3)
MONOCYTES NFR BLD AUTO: 7.8 %
NEUTROPHILS # BLD AUTO: 2 10E9/L (ref 1.6–8.3)
NEUTROPHILS NFR BLD AUTO: 73.1 %
NITRATE UR QL: NEGATIVE
PH UR STRIP: 5.5 PH (ref 5–7)
PLATELET # BLD AUTO: 53 10E9/L (ref 150–450)
PLATELET # BLD EST: ABNORMAL 10*3/UL
POIKILOCYTOSIS BLD QL SMEAR: SLIGHT
POTASSIUM SERPL-SCNC: 4 MMOL/L (ref 3.4–5.3)
PROT SERPL-MCNC: 7.1 G/DL (ref 6.8–8.8)
RBC # BLD AUTO: 3.2 10E12/L (ref 3.8–5.2)
RBC #/AREA URNS AUTO: 0 /HPF (ref 0–2)
SODIUM SERPL-SCNC: 133 MMOL/L (ref 133–144)
SOURCE: ABNORMAL
SP GR UR STRIP: 1.01 (ref 1–1.03)
UROBILINOGEN UR STRIP-MCNC: NORMAL MG/DL (ref 0–2)
WBC # BLD AUTO: 2.8 10E9/L (ref 4–11)
WBC #/AREA URNS AUTO: 0 /HPF (ref 0–5)

## 2018-12-26 PROCEDURE — 83690 ASSAY OF LIPASE: CPT | Performed by: EMERGENCY MEDICINE

## 2018-12-26 PROCEDURE — 81001 URINALYSIS AUTO W/SCOPE: CPT | Performed by: EMERGENCY MEDICINE

## 2018-12-26 PROCEDURE — 96374 THER/PROPH/DIAG INJ IV PUSH: CPT | Performed by: EMERGENCY MEDICINE

## 2018-12-26 PROCEDURE — 25000128 H RX IP 250 OP 636: Performed by: EMERGENCY MEDICINE

## 2018-12-26 PROCEDURE — 87040 BLOOD CULTURE FOR BACTERIA: CPT | Mod: 91 | Performed by: EMERGENCY MEDICINE

## 2018-12-26 PROCEDURE — 99284 EMERGENCY DEPT VISIT MOD MDM: CPT | Mod: Z6 | Performed by: EMERGENCY MEDICINE

## 2018-12-26 PROCEDURE — 80053 COMPREHEN METABOLIC PANEL: CPT | Performed by: EMERGENCY MEDICINE

## 2018-12-26 PROCEDURE — 85025 COMPLETE CBC W/AUTO DIFF WBC: CPT | Performed by: EMERGENCY MEDICINE

## 2018-12-26 PROCEDURE — 71046 X-RAY EXAM CHEST 2 VIEWS: CPT

## 2018-12-26 PROCEDURE — 96361 HYDRATE IV INFUSION ADD-ON: CPT | Performed by: EMERGENCY MEDICINE

## 2018-12-26 PROCEDURE — 99284 EMERGENCY DEPT VISIT MOD MDM: CPT | Mod: 25 | Performed by: EMERGENCY MEDICINE

## 2018-12-26 RX ORDER — LEVOFLOXACIN 500 MG/1
500 TABLET, FILM COATED ORAL DAILY
Qty: 5 TABLET | Refills: 0 | Status: SHIPPED | OUTPATIENT
Start: 2018-12-26 | End: 2019-01-30

## 2018-12-26 RX ORDER — ONDANSETRON 2 MG/ML
4 INJECTION INTRAMUSCULAR; INTRAVENOUS EVERY 30 MIN PRN
Status: DISCONTINUED | OUTPATIENT
Start: 2018-12-26 | End: 2018-12-26 | Stop reason: HOSPADM

## 2018-12-26 RX ORDER — HEPARIN SODIUM (PORCINE) LOCK FLUSH IV SOLN 100 UNIT/ML 100 UNIT/ML
500 SOLUTION INTRAVENOUS ONCE
Status: COMPLETED | OUTPATIENT
Start: 2018-12-26 | End: 2018-12-26

## 2018-12-26 RX ORDER — SODIUM CHLORIDE 9 MG/ML
1000 INJECTION, SOLUTION INTRAVENOUS CONTINUOUS
Status: DISCONTINUED | OUTPATIENT
Start: 2018-12-26 | End: 2018-12-26 | Stop reason: HOSPADM

## 2018-12-26 RX ADMIN — SODIUM CHLORIDE 1000 ML: 9 INJECTION, SOLUTION INTRAVENOUS at 12:59

## 2018-12-26 RX ADMIN — Medication 500 UNITS: at 19:28

## 2018-12-26 RX ADMIN — ONDANSETRON 4 MG: 2 INJECTION INTRAMUSCULAR; INTRAVENOUS at 13:01

## 2018-12-26 ASSESSMENT — ENCOUNTER SYMPTOMS
VOMITING: 1
COUGH: 0
HEMATURIA: 0
ABDOMINAL PAIN: 0
DIFFICULTY URINATING: 0
WHEEZING: 0
SHORTNESS OF BREATH: 0
FEVER: 1
DYSURIA: 0
DIARRHEA: 0
NAUSEA: 1
FREQUENCY: 0

## 2018-12-26 ASSESSMENT — MIFFLIN-ST. JEOR: SCORE: 966.62

## 2018-12-26 NOTE — PROGRESS NOTES
I returned the page and spoke with ED attending. Mrs. Oviedo was sent to ED because of fever of 102F at home.   In ED Tmax is 98.3F, vital signs are stable. Her work up showed leukopenia, but with normal ANC count of 2.0. Slightly worse anemia and thrombocytopenia likely 2/2 to recent chemotherapy with Gemzar and Carboplatin started on 12/12. UA negative for infection, BCx are pending.  Es per ED attending patient feeling herself well, able to tolerate oral intake and is willing to go home. No localizing signs of infectious process.  Asked to add CXR and RVP. If no acute process ok to be discharge home with Levaquin for 5days.  Will arrange for outpatient follow up (upcoming appt on 1/2), will e-basket and request appointment in the end of this week/beginning next week.  Patient discussed with Dr. Syed Hooper (staff physician on solid tumor service).    Mile Morrison  Hem/Onc fellow

## 2018-12-26 NOTE — TELEPHONE ENCOUNTER
[facility] Telephone Triage Note    Assessment: Patient and Family Member . called in to triage reporting the following symptoms: fever of 102 and vomiting last night and this morning. Dry heaving last night and today. Tried taking tylenol early this morning but vomited shortly after. . Last chemotherapy infusion on 12/19/18    Recommendations:.  ER visit now. Family will transport patient to [facility] ED. Arriving around 11am. .    Follow-Up: Patient voiced understanding of advice and/or instructions given.     Called ED and gave report.

## 2018-12-26 NOTE — ED TRIAGE NOTES
Pt presents ambulatory to triage with her walker and c/o a fever at home of 102F and N/V. Reports she last had chemo about a week ago. Also states she took Tylenol about 1 hour ago.    Hx:Urothelial carcinoma

## 2018-12-26 NOTE — ED NOTES
Bed: IN01  Expected date:   Expected time:   Means of arrival:   Comments:  Marcellus Oviedo, [birth year]   MD. LEIGHA  Stage 4 CA Kidney    Fever 102 N/V  Recent chemo.

## 2018-12-26 NOTE — ED PROVIDER NOTES
History     Chief Complaint   Patient presents with     Fever     Nausea & Vomiting     HPI  Dimple Oviedo is a 85 year old female with a history urothelial cancer last chemotherapy was one week ago. She is here because of recent nausea, vomiting and a fever of almost 102 orally today. She's otherwise doing well. No urinary symptoms, no pulmonary symptoms. No URI symptoms. It's uncertain what her counts are. We will check that, look for signs of infection and do blood cultures and the oncology services are aware she is here. She has no abdominal pain or diarrhea.     This part of the medical record was transcribed by Rashmi Willingham, Medical Scribe, from a dictation done by Dr. Tyson.  I have reviewed the Medications, Allergies, Past Medical and Surgical History, and Social History in the [employer] system.  Past Medical History:   Diagnosis Date     Calculus of kidney      Esophageal reflux      GERD (gastroesophageal reflux disease)      Hyperlipidemia LDL goal <130 5/9/2010     Malignant melanoma of skin of trunk, except scrotum (H)      Nonspecific abnormal finding     has living will 2004 -      Nontoxic multinodular goiter     no further eval /tx rec per pt     Osteopenia      Other psoriasis      Personal history of colonic polyps      PMR (polymyalgia rheumatica) (H)      Stress-induced cardiomyopathy      Undiagnosed cardiac murmurs      Unspecified constipation      Unspecified essential hypertension      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       Past Surgical History:   Procedure Laterality Date     BIOPSY       C NONSPECIFIC PROCEDURE  2005    colonoscopy polyp repeat 2010     COLONOSCOPY  2014     COMBINED CYSTOSCOPY, INSERT STENT URETER(S) Bilateral 9/12/2018    Procedure: COMBINED CYSTOSCOPY, INSERT STENT URETER(S);  Cystoscopy Bilateral ureteral Stent Placement.;  Surgeon: Justin Henry MD;  [facility]     COMBINED CYSTOSCOPY, RETROGRADES, URETEROSCOPY, INSERT STENT Bilateral 4/3/2018     Procedure: COMBINED CYSTOSCOPY, RETROGRADES, URETEROSCOPY, INSERT STENT;;  Surgeon: Stuart King MD;  [facility]     COMBINED CYSTOSCOPY, RETROGRADES, URETEROSCOPY, INSERT STENT Bilateral 9/10/2018    Procedure: COMBINED CYSTOSCOPY, RETROGRADES, URETEROSCOPY, INSERT STENT;  Cystoscopy, Bilateral Ureteroscopy, Bladder Biopsies, Retrogram Pyelograms, Ureteral Washings and brushings, cysview;  Surgeon: Stuart King MD;  [facility]     CYSTOSCOPY, BIOPSY BLADDER INSTILL OPTICAL AGENT N/A 4/3/2018    Procedure: CYSTOSCOPY, BIOPSY BLADDER INSTILL OPTICAL AGENT;  Cystoscopy, Blue Light Cystoscopy, Bladder Biopsies, Bilateral Selective ureteral washings for Cytology, Bilateral Retrograde Pyelograms, Bilateral Ureteroscopy;  Surgeon: Stuart King MD;  [facility]     CYSTOSCOPY, BIOPSY BLADDER, COMBINED N/A 2/19/2018    Procedure: COMBINED CYSTOSCOPY, BIOPSY BLADDER;  Cystoscopy, Bladder Biopsy;  Surgeon: Kenna La MD;  [facility]     CYSTOSCOPY, REMOVE STENT(S), COMBINED  11/13/2018    Procedure: Flexible Cystoscopy with Stent Removal;  Surgeon: Stuart King MD;  [facility]     DAVINCI LYMPHADENECTOMY N/A 11/13/2018    Procedure: Davinci Lymphadenectomy ;  Surgeon: Stuart King MD;  [facility]     DAVINCI NEPHROURETERECTOMY N/A 11/13/2018    Procedure: Right DaVinci Assisted Nephroureterectomy;  Surgeon: Stuart King MD;  [facility]     ENDOSCOPIC ULTRASOUND LOWER GASTROINTESTIONAL TRACT (GI) N/A 10/30/2015    Procedure: ENDOSCOPIC ULTRASOUND LOWER GASTROINTESTIONAL TRACT (GI);  Surgeon: Daniel Jean-Baptiste MD;  [facility]     EYE SURGERY  12/4/17     INSERT PORT VASCULAR ACCESS Right 12/19/2018    Procedure: Chest Port Placement - right;  Surgeon: Stuart Chavez PA-C;  [facility]     IR CHEST PORT PLACEMENT > 5 YRS OF AGE  12/19/2018     LAPAROSCOPIC CHOLECYSTECTOMY WITH  CHOLANGIOGRAMS N/A 11/1/2015    Procedure: LAPAROSCOPIC CHOLECYSTECTOMY WITH CHOLANGIOGRAMS;  Surgeon: Tonie Warren MD;  [facility]     SURGICAL HISTORY OF -   1996    malignant melanoma     SURGICAL HISTORY OF -   1968    thyroid nodule     SURGICAL HISTORY OF -       D & C       Family History   Problem Relation Age of Onset     Cancer Father         dec - esophageal and laryngeal     Heart Disease Mother      Respiratory Mother         dec     Breast Cancer Daughter      Other Cancer Daughter      Thyroid Disease Daughter      Asthma Daughter      Hyperlipidemia Son      Diabetes Son        Social History     Tobacco Use     Smoking status: Never Smoker     Smokeless tobacco: Never Used   Substance Use Topics     Alcohol use: No     Alcohol/week: 0.0 oz       Current Facility-Administered Medications   Medication     ondansetron (ZOFRAN) injection 4 mg     sodium chloride 0.9% infusion     Current Outpatient Medications   Medication     acetaminophen (TYLENOL) 650 MG CR tablet     levofloxacin (LEVAQUIN) 500 MG tablet     alendronate (FOSAMAX) 70 MG tablet     aspirin 81 MG tablet     Calcium Citrate-Vitamin D (CALCIUM + D PO)     colestipol (COLESTID) 1 g tablet     cycloSPORINE (RESTASIS) 0.05 % ophthalmic emulsion     dexamethasone (DECADRON) 4 MG tablet     ferrous sulfate 325 (65 Fe) MG TBEC EC tablet     furosemide (LASIX) 20 MG tablet     HYDROcodone-acetaminophen (NORCO) 5-325 MG per tablet     irbesartan (AVAPRO) 300 MG tablet     lovastatin (MEVACOR) 40 MG tablet     Melatonin 10 MG TABS tablet     methimazole (TAPAZOLE) 5 MG tablet     nitroGLYcerin (NITROSTAT) 0.4 MG sublingual tablet     Omega-3 Fatty Acids (FISH OIL) 500 MG CAPS     omeprazole (PRILOSEC) 20 MG CR capsule     ondansetron (ZOFRAN) 8 MG tablet     order for DME     oxyCODONE IR (ROXICODONE) 5 MG tablet     polyethylene glycol (MIRALAX/GLYCOLAX) packet     Probiotic Product (PROBIOTIC ADVANCED PO)     prochlorperazine  "(COMPAZINE) 10 MG tablet     propranolol ER (INDERAL LA) 60 MG 24 hr capsule     rOPINIRole (REQUIP) 0.25 MG tablet     senna-docusate (SENOKOT-S;PERICOLACE) 8.6-50 MG per tablet     sertraline (ZOLOFT) 100 MG tablet     traZODone (DESYREL) 50 MG tablet        Allergies   Allergen Reactions     Codeine        Review of Systems   Constitutional: Positive for fever.   Respiratory: Negative for cough, shortness of breath and wheezing.    Cardiovascular: Negative for chest pain.   Gastrointestinal: Positive for nausea and vomiting. Negative for abdominal pain and diarrhea.   Genitourinary: Negative for difficulty urinating, dysuria, frequency, hematuria and urgency.   All other systems reviewed and are negative.      Physical Exam   BP: 120/44  Pulse: 74  Temp: 98.1  F (36.7  C)  Resp: 16  Height: 144.8 cm (4' 9\")  Weight: 64.8 kg (142 lb 12.8 oz)  SpO2: 97 %      Physical Exam   Constitutional: She is oriented to person, place, and time. She appears well-developed and well-nourished. No distress.   HENT:   Head: Normocephalic and atraumatic.   Eyes: Conjunctivae and EOM are normal. Pupils are equal, round, and reactive to light. No scleral icterus.   Neck: Neck supple.   Cardiovascular: Normal rate, regular rhythm and normal heart sounds.   Pulmonary/Chest: Effort normal and breath sounds normal. No respiratory distress. She has no wheezes.   Abdominal: Soft.   Neurological: She is alert and oriented to person, place, and time. No cranial nerve deficit.   Skin: Skin is warm and dry. She is not diaphoretic.   Psychiatric: She has a normal mood and affect. Her behavior is normal.   Nursing note and vitals reviewed.      ED Course        Procedures        Results for orders placed or performed during the hospital encounter of 12/26/18   XR Chest 2 Views    Narrative    1. Right chest wall Port-A-Cath with tip projecting over the  cavoatrial junction.  2. No acute airspace disease.     Medications   0.9% sodium chloride " BOLUS (0 mLs Intravenous Stopped 12/26/18 5198)     Followed by   sodium chloride 0.9% infusion (not administered)   ondansetron (ZOFRAN) injection 4 mg (4 mg Intravenous Given 12/26/18 1301)     Discussed with oncology who reviewed the chart they would like her discharged on 5 days of Levaquin   Chest x-ray is clear    Labs Ordered and Resulted from Time of ED Arrival Up to the Time of Departure from the ED   CBC WITH PLATELETS DIFFERENTIAL - Abnormal; Notable for the following components:       Result Value    WBC 2.8 (*)     RBC Count 3.20 (*)     Hemoglobin 8.6 (*)     Hematocrit 27.5 (*)     MCHC 31.3 (*)     Platelet Count 53 (*)     Absolute Lymphocytes 0.5 (*)     All other components within normal limits   COMPREHENSIVE METABOLIC PANEL - Abnormal; Notable for the following components:    Glucose 117 (*)     Creatinine 1.06 (*)     GFR Estimate 48 (*)     GFR Estimate If Black 55 (*)     Calcium 8.3 (*)     Albumin 2.7 (*)     All other components within normal limits   ROUTINE UA WITH MICROSCOPIC - Abnormal; Notable for the following components:    Blood Urine Trace (*)     All other components within normal limits   LIPASE   BLOOD CULTURE   BLOOD CULTURE            Assessments & Plan (with Medical Decision Making)   75-year-old female undergoing treatment for urothelial cancer.  She comes in today for nausea and vomiting and a fever to 101 at home.  Here she is alert, asymptomatic, afebrile, able to take p.o., and laboratory workup is negative including a urinalysis.  She is mildly neutropenic.  The case was discussed with the heme/onc fellow and the recommendation was to obtain a chest x-ray.  This is ordered.  I think it will be negative as she does not have any pulmonary symptoms and then discharged home with Levaquin for 5 days.  They will arrange close follow-up.  The patient is agreeable anxious to be discharged.    This part of the document was transcribed by Brian Gan Medical Scribe.    I have  reviewed the nursing notes.    I have reviewed the findings, diagnosis, plan and need for follow up with the patient.       Medication List      Started    levofloxacin 500 MG tablet  Commonly known as:  LEVAQUIN  500 mg, Oral, DAILY            Final diagnoses:   Fever, unspecified fever cause   Urothelial cancer (H)       12/26/2018   [facility], [place], EMERGENCY DEPARTMENT     Michael Tyson MD  12/26/18 1715

## 2018-12-26 NOTE — ED AVS SNAPSHOT
[facility], [place], Emergency Department  [address] [place] [id number]  Phone:  [phone]                                    Dimple Oviedo   MRN: [id number]    Department:  [facility], Emergency Department   Date of Visit:  12/26/2018           After Visit Summary Signature Page    I have received my discharge instructions, and my questions have been answered. I have discussed any challenges I see with this plan with the nurse or doctor.    ..........................................................................................................................................  Patient/Patient Representative Signature      ..........................................................................................................................................  Patient Representative Print Name and Relationship to Patient    ..................................................               ................................................  Date                                   Time    ..........................................................................................................................................  Reviewed by Signature/Title    ...................................................              ..............................................  Date                                               Time          22EPIC Rev 08/18

## 2018-12-27 ENCOUNTER — PATIENT OUTREACH (OUTPATIENT)
Dept: CARE COORDINATION | Facility: CLINIC | Age: 83
End: 2018-12-27

## 2018-12-27 DIAGNOSIS — R50.9 FEVER: ICD-10-CM

## 2018-12-27 DIAGNOSIS — R11.2 NAUSEA & VOMITING: Primary | ICD-10-CM

## 2018-12-27 NOTE — PROGRESS NOTES
Clinic Care Coordination Contact  [facility]/Voicemail    Referral Source: ED Follow-Up  [facility] ED visit 12/26/2018- nausea /vomiting/ fever   Clinical Data: Care Coordinator Outreach  Outreach attempted x 1.  Left message on voicemail with call back information and requested return call.  Plan:  Care Coordinator will try to reach patient again in 1-2 business days.  Omayra Gatica RN / Clinical Care Coordinator     [facility] [place][note 2]   [email or web address]  Office :  [phone] / Fax :  [phone]

## 2018-12-27 NOTE — DISCHARGE INSTRUCTIONS
No source for infection was found to explain the fever.  The oncologist would like you to take 5 days of Levaquin and they will arrange a follow-up appointment  You may return to the emergency department at any time particularly if you develop persistent fever.

## 2018-12-28 NOTE — PROGRESS NOTES
Clinic Care Coordination Contact  [facility]/Voicemail    Referral Source: ED Follow-Up  Clinical Data: Care Coordinator Outreach  Outreach attempted x 2.  Left message on voicemail with call back information and requested return call.  Plan: Care Coordinator will await a return call from the patient   Omayra Gatica RN / Clinical Care Coordinator     [facility]   [email or web address]  Office :  [phone] / Fax :  [phone]

## 2018-12-31 ENCOUNTER — OFFICE VISIT (OUTPATIENT)
Dept: FAMILY MEDICINE | Facility: CLINIC | Age: 83
End: 2018-12-31
Payer: MEDICARE

## 2018-12-31 VITALS
SYSTOLIC BLOOD PRESSURE: 117 MMHG | TEMPERATURE: 98.2 F | WEIGHT: 141 LBS | BODY MASS INDEX: 30.51 KG/M2 | HEART RATE: 67 BPM | DIASTOLIC BLOOD PRESSURE: 47 MMHG | OXYGEN SATURATION: 97 %

## 2018-12-31 DIAGNOSIS — R07.81 RIB PAIN ON RIGHT SIDE: ICD-10-CM

## 2018-12-31 DIAGNOSIS — G25.81 RESTLESS LEGS SYNDROME: ICD-10-CM

## 2018-12-31 DIAGNOSIS — C68.9 UROTHELIAL CARCINOMA (H): ICD-10-CM

## 2018-12-31 PROCEDURE — 99214 OFFICE O/P EST MOD 30 MIN: CPT | Performed by: FAMILY MEDICINE

## 2018-12-31 RX ORDER — ROPINIROLE 0.25 MG/1
TABLET, FILM COATED ORAL
Qty: 180 TABLET | Refills: 1 | Status: SHIPPED | OUTPATIENT
Start: 2018-12-31 | End: 2019-03-19

## 2018-12-31 NOTE — PATIENT INSTRUCTIONS
Restless leg syndrome -   Requip 0.25 mg in the afternoon and 0.5 mg at night     Rib pain -   Continue to monitor  If getting worse then I'll order a chest xray to evaluate for rib fracture     Please start using a humidifier at night.

## 2018-12-31 NOTE — PROGRESS NOTES
SUBJECTIVE:   Dimple Oviedo is a 85 year old female who presents to clinic today for the following health issues:      ED/UC Followup:    Facility:  [facility]    Date of visit: 12-26-18  Reason for visit: fever          H/o urethelial cancer last chemo on 12/19/18, who was seen at the ER for fever, nausea and vomiting. CXR was negative and pt was discharged home with Levaquin for 5 days. Pending BC NGTD.     Update - Pt notes generalized weakness and appetite changes which are more chronic. Her temps are around 98.9 F. No chills, coughing, chest pain, abdominal pain, dysuria, constipation or diarrhea.     Last night she rolled over in bed and started experiencing right lower rib pain. Pain was worse and now mildly improving. Initially she had trouble taking a deep breath which has improved.     For one month she has been experiencing when she blows her nose, she notices some blood. Symptoms are worse in the morning. She endorses nasal congestion. Not using a humidifier. No sinus pressure, rhinorrhea or sore throat.     RLS - She is taking Requip 0.25 mg BID, symptoms continue to persist.     Problem list and histories reviewed & adjusted, as indicated.  Additional history: as documented    Labs reviewed in EPIC    Reviewed and updated as needed this visit by clinical staff  Allergies  Meds       Reviewed and updated as needed this visit by Provider         ROS:  Constitutional, HEENT, cardiovascular, pulmonary, gi and gu systems are negative, except as otherwise noted.    OBJECTIVE:     /47   Pulse 67   Temp 98.2  F (36.8  C) (Oral)   Wt 64 kg (141 lb)   SpO2 97%   BMI 30.51 kg/m    Body mass index is 30.51 kg/m .  GENERAL: healthy, alert and no distress  EYES: Eyes grossly normal to inspection  HENT:  nose and mouth without ulcers or lesions  MS: no gross musculoskeletal defects noted, no rib tenderness   PSYCH: mentation appears normal, affect normal    Diagnostic Test Results:  none      ASSESSMENT/PLAN:     1. Restless legs syndrome  - uncontrolled and I increased nightime dose by 0.25 mg   - rOPINIRole (REQUIP) 0.25 MG tablet; 0.25 mg in the afternoon and 0.5 mg at night  Dispense: 180 tablet; Refill: 1    2. Urothelial carcinoma (H)  - Recent fever, nausea and vomiting. Tx with levaquin and symptoms improved. Scheduled for chemo on 01/02/09 where she'll have labs repeated.     3. Rib pain on right side  - symptoms improving  - advised to continue symptomatic tx and monitor  - CXR if symptoms persist     4. Nose bleeds   - advised to use a humidifier   - Follow if symptoms worsen or fail to improve.      Summer Gaona MD  [facility]

## 2019-01-01 LAB
BACTERIA SPEC CULT: NO GROWTH
BACTERIA SPEC CULT: NO GROWTH
Lab: NORMAL
Lab: NORMAL
SPECIMEN SOURCE: NORMAL
SPECIMEN SOURCE: NORMAL

## 2019-01-02 ENCOUNTER — INFUSION THERAPY VISIT (OUTPATIENT)
Dept: ONCOLOGY | Facility: CLINIC | Age: 84
End: 2019-01-02
Attending: INTERNAL MEDICINE
Payer: MEDICARE

## 2019-01-02 ENCOUNTER — ONCOLOGY VISIT (OUTPATIENT)
Dept: ONCOLOGY | Facility: CLINIC | Age: 84
End: 2019-01-02
Attending: PHYSICIAN ASSISTANT
Payer: MEDICARE

## 2019-01-02 ENCOUNTER — ANCILLARY PROCEDURE (OUTPATIENT)
Dept: ULTRASOUND IMAGING | Facility: CLINIC | Age: 84
End: 2019-01-02
Attending: PHYSICIAN ASSISTANT
Payer: MEDICARE

## 2019-01-02 VITALS
DIASTOLIC BLOOD PRESSURE: 51 MMHG | OXYGEN SATURATION: 95 % | WEIGHT: 141.3 LBS | TEMPERATURE: 97.8 F | HEART RATE: 62 BPM | RESPIRATION RATE: 18 BRPM | BODY MASS INDEX: 30.58 KG/M2 | SYSTOLIC BLOOD PRESSURE: 129 MMHG

## 2019-01-02 DIAGNOSIS — H91.90 PERCEIVED HEARING CHANGES: Primary | ICD-10-CM

## 2019-01-02 DIAGNOSIS — E05.00 GRAVES DISEASE: ICD-10-CM

## 2019-01-02 DIAGNOSIS — C68.9 UROTHELIAL CARCINOMA (H): ICD-10-CM

## 2019-01-02 DIAGNOSIS — M79.89 SWOLLEN LEG: ICD-10-CM

## 2019-01-02 DIAGNOSIS — D63.0 ANEMIA IN NEOPLASTIC DISEASE: ICD-10-CM

## 2019-01-02 DIAGNOSIS — C66.1 UROTHELIAL CARCINOMA OF RIGHT DISTAL URETER (H): Primary | ICD-10-CM

## 2019-01-02 DIAGNOSIS — C66.1 UROTHELIAL CARCINOMA OF RIGHT DISTAL URETER (H): ICD-10-CM

## 2019-01-02 LAB
ALBUMIN SERPL-MCNC: 2.7 G/DL (ref 3.4–5)
ALP SERPL-CCNC: 114 U/L (ref 40–150)
ALT SERPL W P-5'-P-CCNC: 20 U/L (ref 0–50)
ANION GAP SERPL CALCULATED.3IONS-SCNC: 7 MMOL/L (ref 3–14)
AST SERPL W P-5'-P-CCNC: 15 U/L (ref 0–45)
BILIRUB SERPL-MCNC: 0.3 MG/DL (ref 0.2–1.3)
BUN SERPL-MCNC: 28 MG/DL (ref 7–30)
CALCIUM SERPL-MCNC: 8.8 MG/DL (ref 8.5–10.1)
CHLORIDE SERPL-SCNC: 101 MMOL/L (ref 94–109)
CO2 SERPL-SCNC: 26 MMOL/L (ref 20–32)
CREAT SERPL-MCNC: 0.97 MG/DL (ref 0.52–1.04)
FERRITIN SERPL-MCNC: 181 NG/ML (ref 8–252)
GFR SERPL CREATININE-BSD FRML MDRD: 53 ML/MIN/{1.73_M2}
GLUCOSE SERPL-MCNC: 130 MG/DL (ref 70–99)
IRON SATN MFR SERPL: 17 % (ref 15–46)
IRON SERPL-MCNC: 45 UG/DL (ref 35–180)
POTASSIUM SERPL-SCNC: 4.1 MMOL/L (ref 3.4–5.3)
PROT SERPL-MCNC: 7.3 G/DL (ref 6.8–8.8)
SODIUM SERPL-SCNC: 134 MMOL/L (ref 133–144)
T3 SERPL-MCNC: 109 NG/DL (ref 60–181)
T4 FREE SERPL-MCNC: 1.26 NG/DL (ref 0.76–1.46)
TIBC SERPL-MCNC: 258 UG/DL (ref 240–430)
TSH SERPL DL<=0.005 MIU/L-ACNC: <0.01 MU/L (ref 0.4–4)

## 2019-01-02 PROCEDURE — 96417 CHEMO IV INFUS EACH ADDL SEQ: CPT

## 2019-01-02 PROCEDURE — 80053 COMPREHEN METABOLIC PANEL: CPT | Performed by: PHYSICIAN ASSISTANT

## 2019-01-02 PROCEDURE — G0463 HOSPITAL OUTPT CLINIC VISIT: HCPCS | Mod: ZF

## 2019-01-02 PROCEDURE — 96413 CHEMO IV INFUSION 1 HR: CPT

## 2019-01-02 PROCEDURE — 82728 ASSAY OF FERRITIN: CPT | Performed by: PHYSICIAN ASSISTANT

## 2019-01-02 PROCEDURE — 83550 IRON BINDING TEST: CPT | Performed by: PHYSICIAN ASSISTANT

## 2019-01-02 PROCEDURE — 85025 COMPLETE CBC W/AUTO DIFF WBC: CPT | Performed by: PHYSICIAN ASSISTANT

## 2019-01-02 PROCEDURE — 84480 ASSAY TRIIODOTHYRONINE (T3): CPT | Performed by: INTERNAL MEDICINE

## 2019-01-02 PROCEDURE — 84443 ASSAY THYROID STIM HORMONE: CPT | Performed by: INTERNAL MEDICINE

## 2019-01-02 PROCEDURE — 25000128 H RX IP 250 OP 636: Mod: ZF | Performed by: PHYSICIAN ASSISTANT

## 2019-01-02 PROCEDURE — 96367 TX/PROPH/DG ADDL SEQ IV INF: CPT

## 2019-01-02 PROCEDURE — 99215 OFFICE O/P EST HI 40 MIN: CPT | Mod: ZP | Performed by: PHYSICIAN ASSISTANT

## 2019-01-02 PROCEDURE — 83540 ASSAY OF IRON: CPT | Performed by: PHYSICIAN ASSISTANT

## 2019-01-02 PROCEDURE — 84439 ASSAY OF FREE THYROXINE: CPT | Performed by: INTERNAL MEDICINE

## 2019-01-02 RX ORDER — ALBUTEROL SULFATE 0.83 MG/ML
2.5 SOLUTION RESPIRATORY (INHALATION)
Status: CANCELLED | OUTPATIENT
Start: 2019-01-02

## 2019-01-02 RX ORDER — LORAZEPAM 2 MG/ML
0.5 INJECTION INTRAMUSCULAR EVERY 4 HOURS PRN
Status: CANCELLED
Start: 2019-01-02

## 2019-01-02 RX ORDER — DIPHENHYDRAMINE HYDROCHLORIDE 50 MG/ML
50 INJECTION INTRAMUSCULAR; INTRAVENOUS
Status: CANCELLED
Start: 2019-01-02

## 2019-01-02 RX ORDER — METHYLPREDNISOLONE SODIUM SUCCINATE 125 MG/2ML
125 INJECTION, POWDER, LYOPHILIZED, FOR SOLUTION INTRAMUSCULAR; INTRAVENOUS
Status: CANCELLED
Start: 2019-01-09

## 2019-01-02 RX ORDER — MEPERIDINE HYDROCHLORIDE 25 MG/ML
25 INJECTION INTRAMUSCULAR; INTRAVENOUS; SUBCUTANEOUS EVERY 30 MIN PRN
Status: CANCELLED | OUTPATIENT
Start: 2019-01-02

## 2019-01-02 RX ORDER — LORAZEPAM 2 MG/ML
0.5 INJECTION INTRAMUSCULAR EVERY 4 HOURS PRN
Status: CANCELLED
Start: 2019-01-09

## 2019-01-02 RX ORDER — MEPERIDINE HYDROCHLORIDE 25 MG/ML
25 INJECTION INTRAMUSCULAR; INTRAVENOUS; SUBCUTANEOUS EVERY 30 MIN PRN
Status: CANCELLED | OUTPATIENT
Start: 2019-01-09

## 2019-01-02 RX ORDER — SODIUM CHLORIDE 9 MG/ML
1000 INJECTION, SOLUTION INTRAVENOUS CONTINUOUS PRN
Status: CANCELLED
Start: 2019-01-09

## 2019-01-02 RX ORDER — SODIUM CHLORIDE 9 MG/ML
1000 INJECTION, SOLUTION INTRAVENOUS CONTINUOUS PRN
Status: CANCELLED
Start: 2019-01-02

## 2019-01-02 RX ORDER — HEPARIN SODIUM (PORCINE) LOCK FLUSH IV SOLN 100 UNIT/ML 100 UNIT/ML
5 SOLUTION INTRAVENOUS
Status: DISCONTINUED | OUTPATIENT
Start: 2019-01-02 | End: 2019-01-08 | Stop reason: HOSPADM

## 2019-01-02 RX ORDER — LIDOCAINE/PRILOCAINE 2.5 %-2.5%
CREAM (GRAM) TOPICAL PRN
Qty: 30 G | Refills: 0 | Status: SHIPPED | OUTPATIENT
Start: 2019-01-02 | End: 2019-02-20

## 2019-01-02 RX ORDER — EPINEPHRINE 0.3 MG/.3ML
0.3 INJECTION SUBCUTANEOUS EVERY 5 MIN PRN
Status: CANCELLED | OUTPATIENT
Start: 2019-01-02

## 2019-01-02 RX ORDER — EPINEPHRINE 1 MG/ML
0.3 INJECTION, SOLUTION INTRAMUSCULAR; SUBCUTANEOUS EVERY 5 MIN PRN
Status: CANCELLED | OUTPATIENT
Start: 2019-01-09

## 2019-01-02 RX ORDER — EPINEPHRINE 1 MG/ML
0.3 INJECTION, SOLUTION INTRAMUSCULAR; SUBCUTANEOUS EVERY 5 MIN PRN
Status: CANCELLED | OUTPATIENT
Start: 2019-01-02

## 2019-01-02 RX ORDER — METHYLPREDNISOLONE SODIUM SUCCINATE 125 MG/2ML
125 INJECTION, POWDER, LYOPHILIZED, FOR SOLUTION INTRAMUSCULAR; INTRAVENOUS
Status: CANCELLED
Start: 2019-01-02

## 2019-01-02 RX ORDER — EPINEPHRINE 0.3 MG/.3ML
0.3 INJECTION SUBCUTANEOUS EVERY 5 MIN PRN
Status: CANCELLED | OUTPATIENT
Start: 2019-01-09

## 2019-01-02 RX ORDER — ALBUTEROL SULFATE 90 UG/1
1-2 AEROSOL, METERED RESPIRATORY (INHALATION)
Status: CANCELLED
Start: 2019-01-02

## 2019-01-02 RX ORDER — ALBUTEROL SULFATE 90 UG/1
1-2 AEROSOL, METERED RESPIRATORY (INHALATION)
Status: CANCELLED
Start: 2019-01-09

## 2019-01-02 RX ORDER — DIPHENHYDRAMINE HYDROCHLORIDE 50 MG/ML
50 INJECTION INTRAMUSCULAR; INTRAVENOUS
Status: CANCELLED
Start: 2019-01-09

## 2019-01-02 RX ORDER — ALBUTEROL SULFATE 0.83 MG/ML
2.5 SOLUTION RESPIRATORY (INHALATION)
Status: CANCELLED | OUTPATIENT
Start: 2019-01-09

## 2019-01-02 RX ADMIN — GEMCITABINE 1600 MG: 38 INJECTION, SOLUTION INTRAVENOUS at 09:57

## 2019-01-02 RX ADMIN — SODIUM CHLORIDE 250 ML: 9 INJECTION, SOLUTION INTRAVENOUS at 09:32

## 2019-01-02 RX ADMIN — Medication 5 ML: at 11:06

## 2019-01-02 RX ADMIN — DEXAMETHASONE SODIUM PHOSPHATE: 10 INJECTION, SOLUTION INTRAMUSCULAR; INTRAVENOUS at 09:34

## 2019-01-02 RX ADMIN — CARBOPLATIN 290 MG: 10 INJECTION, SOLUTION INTRAVENOUS at 10:32

## 2019-01-02 ASSESSMENT — PAIN SCALES - GENERAL: PAINLEVEL: NO PAIN (0)

## 2019-01-02 NOTE — PROGRESS NOTES
"MEDICAL ONCOLOGY CLINIC NOTE  Jan 2, 2019          REASON FOR CURRENT VISIT: h/o high-grade transitional cell carcinoma of right renal pelvis. ED follow up    HISTORY OF PRESENT ILLNESS:  Ms. 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  Ms. Oviedo recovered well from her extensive surgery. No signs/symptoms of locoregional or distant metastatic disease.  She met with Dr Toledo and discusssed adjuvant chemotherapy with Cisplatin + gemzar.      ONCOLOGIC HISTORY:  1. 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   Jan 2018 to September 2018- Persistent gross hematuria and underwent multiple procedures.    2/19/18 and 4/3/18- cystoscopies with bladder biopsies  which were negative for bladder tumor  1/17/18, 3/8/18, 7/26/18, 9/10/18- Multiple urine cytologies have come back positive by FISH for high-grade transitional cell carcinoma  9/10/18- Underwent a bilateral cystoureteroscopy with biopsy and brushing that showed  right upper tract cytology suspicious for high-grade urothelial ca. Right ureter brushing negative from that day. Left upper tract negative.  9/10/18- CT A/P without contrast showed new small bilateral pleural effusions and interstitial pulmonary edema but no evidence of locoregional or metastatic disease.  9/12/18- Presented to ED with oliguria, CRESENCIO, and mild hydronephrosis bilaterally on CT scan and underwent bilateral ureteral stent placment  11/13/2018- Right robotic nephroureterectomy, excision of asndip-caval mass and LN excision by Stuart King. Pathology showed \"Right nephroureterectomy: Invasive high grade urothelial carcinoma measuring 3.5 cm, located in renal pelvis and invading through renal parenchyma into perinephric fat. Urothelial carcinoma in-situ present. Margins free of tumor. " "Ana-caval mass: Metastatic urothelial carcinoma involving one lymph node with at least 1 cm tumor deposit. Pericaval Extranodal Extension present. Five additional benign pericaval lymph nodes. Pathologic stage: pT4N1 (1 of 6 lymph nodes).\"  12/11/18- PET/CT whole body demonstrated significant residual FDG avid disease. For example, \"there is an FDG avid ill-defined pericaval mass immediately medial to the surgical clips measuring approximately 2.0 x 1.4 cm, with max SUV measuring up to12.2, see series 4 image 21. Additional FDG avid retrocaval and interaortocaval lymphadenopathy are noted.There is a 1.2 cm nodule in the right hemipelvis posterior lateral tothe bladder with max SUV measuring 8.3, see series 4 image 77. The bladder is incompletely distended.\" No suspicious thoracic or bone uptake.  12/12/18- Started adjuvant chemotherapy (carbo+gem) per POUT trial.  12/26/18- ED visit for fever, nausea and vomiting    INTERVAL HISTORY: Dimple is here today for follow up with her daughter.  She is feeling much better and is unsure what the cause of her fever, N/V were last week.  It resolved quickly and she has been afebrile the last week.  She wonders if she had a bug as she has not even had much nausea with this regimen.  A little tearful on the future, but coping well.  No new neuropathy, but does feel her hearing is definitely less since starting chemo.     PHYSICAL EXAMINATION:  Vital signs: /51 (BP Location: Right arm, Patient Position: Sitting, Cuff Size: Adult Regular)   Pulse 62   Temp 97.8  F (36.6  C) (Oral)   Resp 18   Wt 64.1 kg (141 lb 4.8 oz)   SpO2 95%   BMI 30.58 kg/m    ECOG performance status of 1. Living independently at home.  GENERAL: Well-nourished healthy-appearing sitting in chair, no acute distress.   HEENT: No icterus, no pallor. Moist mucous membranes. Oropharynx is clear.   NECK: Supple, no JVD/LAD.  LUNGS: Clear to ausculation bilaterally, normal work of breathing. "   CARDIOVASCULAR: Regular rate and rhythm, no murmurs, gallops or rubs.   ABDOMEN: Soft, nontender and nondistended, no palpable masses, bowel sounds present.  EXTREMITIES: No cyanosis, no clubbing, noted right sided leg edema today  NEUROLOGIC: No focal deficits, CN 2-12 intact.  LYMPH NODE EXAM: No palpable adenopathy - cervical, axillary or inguinal.     LABORATORY DATA:     12/19/2018 09:20 12/26/2018 12:53 1/2/2019 07:25   WBC 2.5 (L) 2.8 (L) 3.8 (L)   Hemoglobin 9.4 (L) 8.6 (L) 9.0 (L)   Hematocrit 29.9 (L) 27.5 (L) 28.3 (L)   Platelet Count 179 53 (L) 271   RBC Count 3.49 (L) 3.20 (L) 3.26 (L)   MCV 86 86 87   MCH 26.9 26.9 27.6   MCHC 31.4 (L) 31.3 (L) 31.8   RDW 14.3 14.3 15.1 (H)   Diff Method Automated Method Manual Differential Manual Differential   % Neutrophils 54.9 73.1 54.3   % Lymphocytes 34.7 19.1 35.1   % Monocytes 5.2 7.8 5.3   % Eosinophils 3.2 0.0 0.0   % Basophils 1.6 0.0 0.9   % Metamyelocytes   0.9   % Myelocytes   2.6   % Immature Granulocytes 0.4     % Other Cells   0.9   Nucleated RBCs 0  1 (H)   Absolute Neutrophil 1.4 (L) 2.0 2.1      1/2/2019 07:25   Sodium 134   Potassium 4.1   Chloride 101   Carbon Dioxide 26   Urea Nitrogen 28   Creatinine 0.97   GFR Estimate 53 (L)   GFR Estimate If Black 61   Calcium 8.8   Anion Gap 7   Albumin 2.7 (L)   Protein Total 7.3   Bilirubin Total 0.3   Alkaline Phosphatase 114   ALT 20   AST 15   Ferritin 181   Iron 45   Iron Binding Cap 258   Iron Saturation Index 17   T4 Free 1.26   Triiodothyronine (T3) 109   TSH <0.01 (L)   Glucose 130 (H)        11/1/2018 14:12 1/2/2019 07:25   Ferritin 36 181   Iron 46 45   Iron Binding Cap 388 258   Iron Saturation Index 12 (L) 17     ASSESSMENT AND PLAN: Ms. 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 Had some fatigue for a few days after the carboplatin, but otherwise nausea has been minimal and she is feeling ready for chemotherapy C2 today.  We'll proceed with C2 today and set Dimple up for CT CAP w/o contrast and to see Dr Toledo prior to C3.     #ID: had fever with no known etiology last week 12/26/18.  Resolved with no recent infectious concerns.  CXR and BC were NEG for infection.     #heme:   --blast noted today, sent to [facility], likely from marrow regeneration  --ongoing FRANK anemia, from prior surgery, improved iron levels and MCV continue oral iron, could try BID   --Dimple started chemo on 12/12 with Hgb of 10.5, we discussed its likely at some point that she will need PRBC, signed consent today and put blood orders in for 1 unit if hgb <8 or symptomatic  --US ordered as right leg was swollen today, has h/o intermittent JORJE. No dvt noted on US.     #hearing: already has some age related hearing loss, feels the carbo definitely made this worse.  Will set up audiograms after this next cycle.  May need to adjust carbo dose.     #coping: a little teary as she knows this is palliative chemotherapy. Discussed prognosis and potential options and goals to maintain independence and protect quality of life.  Has good support, taking Zoloft    #cv (h/o afib, CHF, HTN, hyperlipidemia); vitals stable, continue with propranolol, lovastatin, irbesartan, lasix and omega-3    Nicolasa Lee PA-C

## 2019-01-02 NOTE — PROGRESS NOTES
Infusion Nursing Note:  Dimple Oviedo presents today for Cycle 2 Day 1 Carboplatin and Gemzar.    Patient seen by provider today: Yes: ALYSSA Cooley saw patient prior to infusion   present during visit today: Not Applicable.    Note: Patient to go to ultrasound at 1230 today.  Per Nicolasa OK for patient to go home after ultrasound.    Intravenous Access:  Implanted Port.    Treatment Conditions:  Lab Results   Component Value Date    HGB 9.0 01/02/2019     Lab Results   Component Value Date    WBC 3.8 01/02/2019      Lab Results   Component Value Date    ANEU 2.1 01/02/2019     Lab Results   Component Value Date     01/02/2019      Lab Results   Component Value Date     01/02/2019                   Lab Results   Component Value Date    POTASSIUM 4.1 01/02/2019           Lab Results   Component Value Date    CR 0.97 01/02/2019                   Lab Results   Component Value Date    SHREYA 8.8 01/02/2019                Lab Results   Component Value Date    BILITOTAL 0.3 01/02/2019           Lab Results   Component Value Date    ALBUMIN 2.7 01/02/2019                    Lab Results   Component Value Date    ALT 20 01/02/2019           Lab Results   Component Value Date    AST 15 01/02/2019       Results reviewed, labs MET treatment parameters, ok to proceed with treatment.      Post Infusion Assessment:  Patient tolerated infusion without incident.  Blood return noted pre and post infusion.  Site patent and intact, free from redness, edema or discomfort.  No evidence of extravasations.  Access discontinued per protocol.    Discharge Plan:   Patient declined prescription refills.  Discharge instructions reviewed with: Patient.  Patient and/or family verbalized understanding of discharge instructions and all questions answered.  Copy of AVS reviewed with patient and/or family.  Patient will return 1/9/19 for next appointment.  Patient discharged in stable condition accompanied by:  daughter-in-law.  Departure Mode: Ambulatory.    Talia Camara RN

## 2019-01-02 NOTE — PATIENT INSTRUCTIONS
Contact Numbers  [facility]: [phone] (for scheduling changes)  Triage: [phone] (for symptoms)    Please call the [facility] Triage line if you experience a temperature greater than or equal to 100.4, shaking chills, have uncontrolled nausea, vomiting and/or diarrhea, dizziness, shortness of breath, chest pain, bleeding, unexplained bruising, or if you have any other new/concerning symptoms, questions or concerns.     If you are having any concerning symptoms or wish to speak to a provider before your next infusion visit, please call your care coordinator or triage to notify them so we can adequately serve you.     If you need a refill on a narcotic prescription or other medication, please call triage before your infusion appointment.

## 2019-01-02 NOTE — LETTER
"1/2/2019      RE: Dimple Oviedo  [address]  [facility] [address]       MEDICAL ONCOLOGY CLINIC NOTE  Jan 2, 2019          REASON FOR CURRENT VISIT: h/o high-grade transitional cell carcinoma of right renal pelvis. ED follow up    HISTORY OF PRESENT ILLNESS:  Ms. Dimple Oviedo is a 85-year-old lady, who presents for a follow-up visit for high-grade stage IV ([id number]) transitional cell carcinoma of right renal pelvis.  Ms. Oviedo recovered well from her extensive surgery. No signs/symptoms of locoregional or distant metastatic disease.  She met with Dr Toledo and discusssed adjuvant chemotherapy with Cisplatin + gemzar.      ONCOLOGIC HISTORY:  1. 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   Jan 2018 to September 2018- Persistent gross hematuria and underwent multiple procedures.    2/19/18 and 4/3/18- cystoscopies with bladder biopsies  which were negative for bladder tumor  1/17/18, 3/8/18, 7/26/18, 9/10/18- Multiple urine cytologies have come back positive by FISH for high-grade transitional cell carcinoma  9/10/18- Underwent a bilateral cystoureteroscopy with biopsy and brushing that showed  right upper tract cytology suspicious for high-grade urothelial ca. Right ureter brushing negative from that day. Left upper tract negative.  9/10/18- CT A/P without contrast showed new small bilateral pleural effusions and interstitial pulmonary edema but no evidence of locoregional or metastatic disease.  9/12/18- Presented to ED with oliguria, CRESENCIO, and mild hydronephrosis bilaterally on CT scan and underwent bilateral ureteral stent placment  11/13/2018- Right robotic nephroureterectomy, excision of sandip-caval mass and LN excision by Stuart King. Pathology showed \"Right nephroureterectomy: Invasive high grade urothelial carcinoma measuring 3.5 cm, located in renal pelvis and invading through renal " "parenchyma into perinephric fat. Urothelial carcinoma in-situ present. Margins free of tumor. Ana-caval mass: Metastatic urothelial carcinoma involving one lymph node with at least 1 cm tumor deposit. Pericaval Extranodal Extension present. Five additional benign pericaval lymph nodes. Pathologic stage: pT4N1 (1 of 6 lymph nodes).\"  12/11/18- PET/CT whole body demonstrated significant residual FDG avid disease. For example, \"there is an FDG avid ill-defined pericaval mass immediately medial to the surgical clips measuring approximately 2.0 x 1.4 cm, with max SUV measuring up to12.2, see series 4 image 21. Additional FDG avid retrocaval and interaortocaval lymphadenopathy are noted.There is a 1.2 cm nodule in the right hemipelvis posterior lateral tothe bladder with max SUV measuring 8.3, see series 4 image 77. The bladder is incompletely distended.\" No suspicious thoracic or bone uptake.  12/12/18- Started adjuvant chemotherapy (carbo+gem) per POUT trial.  12/26/18- ED visit for fever, nausea and vomiting    INTERVAL HISTORY: Dimple is here today for follow up with her daughter.  She is feeling much better and is unsure what the cause of her fever, N/V were last week.  It resolved quickly and she has been afebrile the last week.  She wonders if she had a bug as she has not even had much nausea with this regimen.  A little tearful on the future, but coping well.  No new neuropathy, but does feel her hearing is definitely less since starting chemo.     PHYSICAL EXAMINATION:  Vital signs: /51 (BP Location: Right arm, Patient Position: Sitting, Cuff Size: Adult Regular)   Pulse 62   Temp 97.8  F (36.6  C) (Oral)   Resp 18   Wt 64.1 kg (141 lb 4.8 oz)   SpO2 95%   BMI 30.58 kg/m     ECOG performance status of 1. Living independently at home.  GENERAL: Well-nourished healthy-appearing sitting in chair, no acute distress.   HEENT: No icterus, no pallor. Moist mucous membranes. Oropharynx is clear.   NECK: " Supple, no JVD/LAD.  LUNGS: Clear to ausculation bilaterally, normal work of breathing.   CARDIOVASCULAR: Regular rate and rhythm, no murmurs, gallops or rubs.   ABDOMEN: Soft, nontender and nondistended, no palpable masses, bowel sounds present.  EXTREMITIES: No cyanosis, no clubbing, noted right sided leg edema today  NEUROLOGIC: No focal deficits, CN 2-12 intact.  LYMPH NODE EXAM: No palpable adenopathy - cervical, axillary or inguinal.     LABORATORY DATA:     12/19/2018 09:20 12/26/2018 12:53 1/2/2019 07:25   WBC 2.5 (L) 2.8 (L) 3.8 (L)   Hemoglobin 9.4 (L) 8.6 (L) 9.0 (L)   Hematocrit 29.9 (L) 27.5 (L) 28.3 (L)   Platelet Count 179 53 (L) 271   RBC Count 3.49 (L) 3.20 (L) 3.26 (L)   MCV 86 86 87   MCH 26.9 26.9 27.6   MCHC 31.4 (L) 31.3 (L) 31.8   RDW 14.3 14.3 15.1 (H)   Diff Method Automated Method Manual Differential Manual Differential   % Neutrophils 54.9 73.1 54.3   % Lymphocytes 34.7 19.1 35.1   % Monocytes 5.2 7.8 5.3   % Eosinophils 3.2 0.0 0.0   % Basophils 1.6 0.0 0.9   % Metamyelocytes   0.9   % Myelocytes   2.6   % Immature Granulocytes 0.4     % Other Cells   0.9   Nucleated RBCs 0  1 (H)   Absolute Neutrophil 1.4 (L) 2.0 2.1      1/2/2019 07:25   Sodium 134   Potassium 4.1   Chloride 101   Carbon Dioxide 26   Urea Nitrogen 28   Creatinine 0.97   GFR Estimate 53 (L)   GFR Estimate If Black 61   Calcium 8.8   Anion Gap 7   Albumin 2.7 (L)   Protein Total 7.3   Bilirubin Total 0.3   Alkaline Phosphatase 114   ALT 20   AST 15   Ferritin 181   Iron 45   Iron Binding Cap 258   Iron Saturation Index 17   T4 Free 1.26   Triiodothyronine (T3) 109   TSH <0.01 (L)   Glucose 130 (H)        11/1/2018 14:12 1/2/2019 07:25   Ferritin 36 181   Iron 46 45   Iron Binding Cap 388 258   Iron Saturation Index 12 (L) 17     ASSESSMENT AND PLAN: Ms. 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 Had some fatigue for a few days after the carboplatin, but otherwise nausea has been minimal and she is feeling ready for chemotherapy C2 today.  We'll proceed with C2 today and set Dimple up for CT CAP w/o contrast and to see Dr Toledo prior to C3.     #ID: had fever with no known etiology last week 12/26/18.  Resolved with no recent infectious concerns.  CXR and BC were NEG for infection.     #heme:   --blast noted today, sent to [facility], likely from marrow regeneration  --ongoing FRANK anemia, from prior surgery, improved iron levels and MCV continue oral iron, could try BID   --Dimple started chemo on 12/12 with Hgb of 10.5, we discussed its likely at some point that she will need PRBC, signed consent today and put blood orders in for 1 unit if hgb <8 or symptomatic  --US ordered as right leg was swollen today, has h/o intermittent JORJE. No dvt noted on US.     #hearing: already has some age related hearing loss, feels the carbo definitely made this worse.  Will set up audiograms after this next cycle.  May need to adjust carbo dose.     #coping: a little teary as she knows this is palliative chemotherapy. Discussed prognosis and potential options and goals to maintain independence and protect quality of life.  Has good support, taking Zoloft    #cv (h/o afib, CHF, HTN, hyperlipidemia); vitals stable, continue with propranolol, lovastatin, irbesartan, lasix and omega-3    Nicolasa Lee PA-C

## 2019-01-02 NOTE — NURSING NOTE
Chief Complaint   Patient presents with     Port Draw     Labs drawn via port by RN, line flushed and hep locked, VS taken     Rosi Nolasco RN

## 2019-01-02 NOTE — NURSING NOTE
"Oncology Rooming Note    January 2, 2019 7:35 AM   Dimple Oviedo is a 85 year old female who presents for:    Chief Complaint   Patient presents with     Port Draw     Labs drawn via port by RN, line flushed and hep locked, VS taken     Oncology Clinic Visit     Return visit related to MET Renal Cancer     Initial Vitals: /51 (BP Location: Right arm, Patient Position: Sitting, Cuff Size: Adult Regular)   Pulse 62   Temp 97.8  F (36.6  C) (Oral)   Resp 18   Wt 64.1 kg (141 lb 4.8 oz)   SpO2 95%   BMI 30.58 kg/m   Estimated body mass index is 30.58 kg/m  as calculated from the following:    Height as of 12/26/18: 1.448 m (4' 9\").    Weight as of this encounter: 64.1 kg (141 lb 4.8 oz). Body surface area is 1.61 meters squared.  No Pain (0) Comment: Data Unavailable   No LMP recorded. Patient is postmenopausal.  Allergies reviewed: Yes  Medications reviewed: Yes    Medications: Medication refills not needed today.  Pharmacy name entered into [facility]:    [id number] - [facility] [address] [place] [address] AVE AT [address] PHARMACY MAIL DELIVERY - [facility] [address]    Clinical concerns: No new concerns. Provider was notified.    10 minutes for nursing intake (face to face time)     Roxi Curry LPN            "

## 2019-01-03 ENCOUNTER — TELEPHONE (OUTPATIENT)
Dept: AUDIOLOGY | Facility: CLINIC | Age: 84
End: 2019-01-03

## 2019-01-03 LAB
BASOPHILS # BLD AUTO: 0 10E9/L (ref 0–0.2)
BASOPHILS NFR BLD AUTO: 0.9 %
DIFFERENTIAL METHOD BLD: ABNORMAL
EOSINOPHIL # BLD AUTO: 0 10E9/L (ref 0–0.7)
EOSINOPHIL NFR BLD AUTO: 0 %
ERYTHROCYTE [DISTWIDTH] IN BLOOD BY AUTOMATED COUNT: 15.1 % (ref 10–15)
HCT VFR BLD AUTO: 28.3 % (ref 35–47)
HGB BLD-MCNC: 9 G/DL (ref 11.7–15.7)
LYMPHOCYTES # BLD AUTO: 1.3 10E9/L (ref 0.8–5.3)
LYMPHOCYTES NFR BLD AUTO: 35.1 %
MCH RBC QN AUTO: 27.6 PG (ref 26.5–33)
MCHC RBC AUTO-ENTMCNC: 31.8 G/DL (ref 31.5–36.5)
MCV RBC AUTO: 87 FL (ref 78–100)
METAMYELOCYTES # BLD: 0 10E9/L
METAMYELOCYTES NFR BLD MANUAL: 0.9 %
MICROCYTES BLD QL SMEAR: PRESENT
MONOCYTES # BLD AUTO: 0.2 10E9/L (ref 0–1.3)
MONOCYTES NFR BLD AUTO: 5.3 %
MYELOCYTES # BLD: 0.1 10E9/L
MYELOCYTES NFR BLD MANUAL: 2.6 %
NEUTROPHILS # BLD AUTO: 2.1 10E9/L (ref 1.6–8.3)
NEUTROPHILS NFR BLD AUTO: 54.3 %
NRBC # BLD AUTO: 0 10*3/UL
NRBC BLD AUTO-RTO: 1 /100
OTHER CELLS # BLD MANUAL: 0 10E9/L
OTHER CELLS NFR BLD MANUAL: 0.9 %
OVALOCYTES BLD QL SMEAR: SLIGHT
PLATELET # BLD AUTO: 271 10E9/L (ref 150–450)
PLATELET # BLD EST: ABNORMAL 10*3/UL
POIKILOCYTOSIS BLD QL SMEAR: SLIGHT
POLYCHROMASIA BLD QL SMEAR: SLIGHT
RBC # BLD AUTO: 3.26 10E12/L (ref 3.8–5.2)
WBC # BLD AUTO: 3.8 10E9/L (ref 4–11)

## 2019-01-03 NOTE — TELEPHONE ENCOUNTER
I spoke with patient and  will have drt call back tomorrow to schedule appt. Call center can schedule this appointment you need to do visit type NAOMI for hearing test and that will bring up appts for the 60 min appt. This didn't need to go to the CC's to schedule a hearing test, that is something call center is able to schedule. Thank you.    Referred by Ursula Lee Chemo hearing monitoring

## 2019-01-03 NOTE — TELEPHONE ENCOUNTER
[facility] Call [facility]    Phone Message    May a detailed message be left on voicemail: yes    Reason for Call: Other: Angle from [facility] called to schedule patient for Chemo hearing monitoring. There was no available time within the time frame requesting and per GL I was trying to schedule her during a win spot for 0/22 at 330 and change the time to 60 min and it would not let me said I was over booking. I have never had to do this before so I was unsure what to do if someone can please follow up with the patient to schedule for this. Thank you they were hoping to come in 01/21 for hearing test. Thank you.    Action Taken: Message routed to:  [facility] ([facility] audiology

## 2019-01-04 RX ORDER — METHIMAZOLE 5 MG/1
TABLET ORAL
Qty: 225 TABLET | Refills: 3 | Status: SHIPPED | OUTPATIENT
Start: 2019-01-04 | End: 2020-05-11

## 2019-01-08 DIAGNOSIS — E04.2 NONTOXIC MULTINODULAR GOITER: ICD-10-CM

## 2019-01-08 NOTE — TELEPHONE ENCOUNTER
Pt would like to request a 90 day supply as the last refill was only a 30 day supply. Please assist. Thanks!

## 2019-01-08 NOTE — TELEPHONE ENCOUNTER
"Requested Prescriptions   Pending Prescriptions Disp Refills     propranolol ER (INDERAL LA) 60 MG 24 hr capsule [Pharmacy Med Name: PROPRANOLOL HCL ER 60 MG Capsule Extended Release 24 Hour]  Last Written Prescription Date:  12/13/18  Last Fill Quantity: 30,  # refills: 0   Last office visit: 12/31/2018 with prescribing provider:     Future Office Visit:     30 capsule 0     Sig: TAKE 1 CAPSULE EVERY DAY (FURTHER REFILLS BY PCP)    Beta-Blockers Protocol Passed - 1/8/2019  4:16 PM       Passed - Blood pressure under 140/90 in past 12 months    BP Readings from Last 3 Encounters:   01/02/19 129/51   12/31/18 117/47   12/26/18 128/50          Passed - Patient is age 6 or older       Passed - Recent (12 mo) or future (30 days) visit within the authorizing provider's specialty    Patient had office visit in the last 12 months or has a visit in the next 30 days with authorizing provider or within the authorizing provider's specialty.  See \"Patient Info\" tab in inbasket, or \"Choose Columns\" in Meds & Orders section of the refill encounter.           Passed - Medication is active on med list        "

## 2019-01-08 NOTE — TELEPHONE ENCOUNTER
"Requested Prescriptions   Pending Prescriptions Disp Refills     propranolol ER (INDERAL LA) 60 MG 24 hr capsule  Last Written Prescription Date:  12/13/2018  Last Fill Quantity: 30 capsule,  # refills: 0   Last Office Visit: 12/31/2018   Future Office Visit:      30 capsule 0    Beta-Blockers Protocol Passed - 1/8/2019 12:16 PM       Passed - Blood pressure under 140/90 in past 12 months    BP Readings from Last 3 Encounters:   01/02/19 129/51   12/31/18 117/47   12/26/18 128/50          Passed - Patient is age 6 or older       Passed - Recent (12 mo) or future (30 days) visit within the authorizing provider's specialty    Patient had office visit in the last 12 months or has a visit in the next 30 days with authorizing provider or within the authorizing provider's specialty.  See \"Patient Info\" tab in inbasket, or \"Choose Columns\" in Meds & Orders section of the refill encounter.           Passed - Medication is active on med list          "

## 2019-01-09 ENCOUNTER — APPOINTMENT (OUTPATIENT)
Dept: LAB | Facility: CLINIC | Age: 84
End: 2019-01-09
Attending: INTERNAL MEDICINE
Payer: MEDICARE

## 2019-01-09 ENCOUNTER — INFUSION THERAPY VISIT (OUTPATIENT)
Dept: ONCOLOGY | Facility: CLINIC | Age: 84
End: 2019-01-09
Attending: INTERNAL MEDICINE
Payer: MEDICARE

## 2019-01-09 VITALS
BODY MASS INDEX: 30.6 KG/M2 | TEMPERATURE: 98.7 F | DIASTOLIC BLOOD PRESSURE: 58 MMHG | WEIGHT: 141.4 LBS | RESPIRATION RATE: 16 BRPM | OXYGEN SATURATION: 98 % | SYSTOLIC BLOOD PRESSURE: 143 MMHG | HEART RATE: 60 BPM

## 2019-01-09 DIAGNOSIS — C68.9 UROTHELIAL CARCINOMA (H): ICD-10-CM

## 2019-01-09 DIAGNOSIS — C66.1 UROTHELIAL CARCINOMA OF RIGHT DISTAL URETER (H): Primary | ICD-10-CM

## 2019-01-09 DIAGNOSIS — Z51.11 ENCOUNTER FOR ANTINEOPLASTIC CHEMOTHERAPY: ICD-10-CM

## 2019-01-09 LAB
BASOPHILS # BLD AUTO: 0 10E9/L (ref 0–0.2)
BASOPHILS NFR BLD AUTO: 1.5 %
DIFFERENTIAL METHOD BLD: ABNORMAL
EOSINOPHIL # BLD AUTO: 0 10E9/L (ref 0–0.7)
EOSINOPHIL NFR BLD AUTO: 0 %
ERYTHROCYTE [DISTWIDTH] IN BLOOD BY AUTOMATED COUNT: 14.4 % (ref 10–15)
HCT VFR BLD AUTO: 27.3 % (ref 35–47)
HGB BLD-MCNC: 8.7 G/DL (ref 11.7–15.7)
IMM GRANULOCYTES # BLD: 0 10E9/L (ref 0–0.4)
IMM GRANULOCYTES NFR BLD: 1.5 %
LYMPHOCYTES # BLD AUTO: 1 10E9/L (ref 0.8–5.3)
LYMPHOCYTES NFR BLD AUTO: 37 %
MCH RBC QN AUTO: 27.3 PG (ref 26.5–33)
MCHC RBC AUTO-ENTMCNC: 31.9 G/DL (ref 31.5–36.5)
MCV RBC AUTO: 86 FL (ref 78–100)
MONOCYTES # BLD AUTO: 0.4 10E9/L (ref 0–1.3)
MONOCYTES NFR BLD AUTO: 15.1 %
NEUTROPHILS # BLD AUTO: 1.2 10E9/L (ref 1.6–8.3)
NEUTROPHILS NFR BLD AUTO: 44.9 %
NRBC # BLD AUTO: 0 10*3/UL
NRBC BLD AUTO-RTO: 0 /100
PLATELET # BLD AUTO: 372 10E9/L (ref 150–450)
RBC # BLD AUTO: 3.19 10E12/L (ref 3.8–5.2)
WBC # BLD AUTO: 2.7 10E9/L (ref 4–11)

## 2019-01-09 PROCEDURE — 25000128 H RX IP 250 OP 636: Mod: ZF | Performed by: PHYSICIAN ASSISTANT

## 2019-01-09 PROCEDURE — G0463 HOSPITAL OUTPT CLINIC VISIT: HCPCS | Mod: 25

## 2019-01-09 PROCEDURE — 96367 TX/PROPH/DG ADDL SEQ IV INF: CPT

## 2019-01-09 PROCEDURE — 25000128 H RX IP 250 OP 636: Mod: ZF | Performed by: INTERNAL MEDICINE

## 2019-01-09 PROCEDURE — 85025 COMPLETE CBC W/AUTO DIFF WBC: CPT | Performed by: PHYSICIAN ASSISTANT

## 2019-01-09 PROCEDURE — 96375 TX/PRO/DX INJ NEW DRUG ADDON: CPT

## 2019-01-09 PROCEDURE — 96377 APPLICATON ON-BODY INJECTOR: CPT | Mod: 59

## 2019-01-09 PROCEDURE — 96413 CHEMO IV INFUSION 1 HR: CPT

## 2019-01-09 RX ORDER — LORAZEPAM 0.5 MG/1
0.5 TABLET ORAL EVERY 6 HOURS PRN
Qty: 30 TABLET | Refills: 0 | Status: SHIPPED | OUTPATIENT
Start: 2019-01-09 | End: 2019-06-12

## 2019-01-09 RX ORDER — PALONOSETRON 0.05 MG/ML
0.25 INJECTION, SOLUTION INTRAVENOUS ONCE
Status: COMPLETED | OUTPATIENT
Start: 2019-01-09 | End: 2019-01-09

## 2019-01-09 RX ORDER — PROPRANOLOL HCL 60 MG
CAPSULE, EXTENDED RELEASE 24HR ORAL
Qty: 30 CAPSULE | Refills: 0 | OUTPATIENT
Start: 2019-01-09

## 2019-01-09 RX ORDER — HEPARIN SODIUM (PORCINE) LOCK FLUSH IV SOLN 100 UNIT/ML 100 UNIT/ML
5 SOLUTION INTRAVENOUS
Status: COMPLETED | OUTPATIENT
Start: 2019-01-09 | End: 2019-01-09

## 2019-01-09 RX ORDER — HEPARIN SODIUM (PORCINE) LOCK FLUSH IV SOLN 100 UNIT/ML 100 UNIT/ML
5 SOLUTION INTRAVENOUS ONCE
Status: COMPLETED | OUTPATIENT
Start: 2019-01-09 | End: 2019-01-09

## 2019-01-09 RX ORDER — PROPRANOLOL HCL 60 MG
CAPSULE, EXTENDED RELEASE 24HR ORAL
Qty: 90 CAPSULE | Refills: 1 | Status: SHIPPED | OUTPATIENT
Start: 2019-01-09 | End: 2019-03-21

## 2019-01-09 RX ADMIN — HEPARIN 5 ML: 100 SYRINGE at 12:03

## 2019-01-09 RX ADMIN — GEMCITABINE 1600 MG: 38 INJECTION, SOLUTION INTRAVENOUS at 15:16

## 2019-01-09 RX ADMIN — PEGFILGRASTIM 6 MG: KIT SUBCUTANEOUS at 15:50

## 2019-01-09 RX ADMIN — DEXAMETHASONE SODIUM PHOSPHATE 150 MG: 10 INJECTION, SOLUTION INTRAMUSCULAR; INTRAVENOUS at 14:20

## 2019-01-09 RX ADMIN — PALONOSETRON HYDROCHLORIDE 0.25 MG: 0.25 INJECTION INTRAVENOUS at 14:00

## 2019-01-09 RX ADMIN — SODIUM CHLORIDE 250 ML: 9 INJECTION, SOLUTION INTRAVENOUS at 13:36

## 2019-01-09 RX ADMIN — HEPARIN 5 ML: 100 SYRINGE at 15:46

## 2019-01-09 ASSESSMENT — PAIN SCALES - GENERAL: PAINLEVEL: NO PAIN (0)

## 2019-01-09 NOTE — PATIENT INSTRUCTIONS
Changes today:    To help with your white count we have given you Neulasta.   You can removed the Neulasta patch tomorrow _________________  Start taking Claritin daily for 1 week to help with the body aches that can be associated with the Neulasta.     To help with nausea you received aloxi and emend through the IV during infusion. Avoid taking zofran for 72 hours after chemotherapy, because you received the aloxi today.  Your new prescription is lorazepam (ativan) which you can take as needed for nausea. This can be taken every 6 hours as needed for nausea, anxiety, or to help with sleep (this may help with the restless legs as well). Three days after chemotherapy you can take the zofran again if needed.     To help with your iron levels take your iron tablets 2 times daily. Take the iron tablets with Vitamin C to help with absorption. This may cause more constipation, so ensure that you are taking the sennakot 2 times daily.     Contact Numbers    [facility] Main Line: [phone]  [facility] Triage and after hours / weekends / holidays:  [phone]      Please call the triage or after hours line if you experience a temperature greater than or equal to 100.5, shaking chills, have uncontrolled nausea, vomiting and/or diarrhea, dizziness, shortness of breath, chest pain, bleeding, unexplained bruising, or if you have any other new/concerning symptoms, questions or concerns.      If you are having any concerning symptoms or wish to speak to a provider before your next infusion visit, please call your care coordinator or triage to notify them so we can adequately serve you.     If you need a refill on a narcotic prescription or other medication, please call before your infusion appointment.                 January 2019 Sunday Monday Tuesday Wednesday Thursday Friday Saturday             1     2    [facility] MASONIC LAB DRAW   7:15 AM   (15 min.)    MASONIC LAB DRAW   [facility] Mason[facility] Lab Draw    [facility] RETURN   7:35 AM   (50  min.)   Ursula Lee PA-C   [facility]    [facility] ONC INFUSION 360   9:00 AM   (360 min.)   [facility] ONCOLOGY INFUSION   [facility]    US LWR EXT VENOUS DUPLEX RIGHT  12:30 PM   (30 min.)   UCUSV1   [facility] US 3     4     5       6     7     8     9    [facility] LAB DRAW  12:00 PM   (15 min.)    MASONIC LAB DRAW   [facility] Masonic Lab Draw    [facility] ONC INFUSION 60  12:30 PM   (60 min.)   [facility] ONCOLOGY INFUSION   [facility] 10    [facility] POST-OP  12:25 PM   (20 min.)   Stuart King MD   [facility] Urology and [facility] 11     12       13     14     15     16     17     18     19       20     21     22    CT CHEST/ABDOMEN/PELVIS W   2:00 PM   (20 min.)   UCCT2   [facility] CT 23    [facility] LAB DRAW   9:00 AM   (15 min.)    MASONIC LAB DRAW   [facility] Lab Draw    UMP RETURN   9:15 AM   (30 min.)   Jaden Toledo MD   [facility]    [facility] ONC INFUSION 360  10:00 AM   (360 min.)    ONCOLOGY INFUSION   [facility] 24     25     26       27     28     29     30    [facility] LAB DRAW   2:00 PM   (15 min.)    MASONIC LAB DRAW   [facility] Masonic Lab Draw    [facility] ONC INFUSION 60   2:30 PM   (60 min.)    ONCOLOGY INFUSION   [facility] 31 February 2019 Sunday Monday Tuesday Wednesday Thursday Friday Saturday                            1     2       3     4     5     6     7     8     9       10     11     12     13    [facility] DRAW   9:00 AM   (15 min.)    [facility]RAW   [facility]    [facility] RETURN   9:25 AM   (50 min.)   Khadra Ellison PA   [facility]    [facility] ONC INFUSION 360  10:30 AM   (360 min.)    ONCOLOGY INFUSION   [facility] 14     15     16       17     18     19     20    [facility] LAB DRAW  11:30 AM   (15 min.)   [facility]  LAB DRAW   [facility] Lab Draw    P ONC INFUSION 60  12:00 PM   (60 min.)   UC ONCOLOGY INFUSION   [facility] 21     22    CHEMO AUDIOGRAM  11:45 AM   (60 min.)   Olinda Vila AuD   [facility] Audiology 23       24     25     26     27     28                              Recent Results (from the past 24 hour(s))   CBC with platelets differential    Collection Time: 01/09/19 12:14 PM   Result Value Ref Range    WBC 2.7 (L) 4.0 - 11.0 10e9/L    RBC Count 3.19 (L) 3.8 - 5.2 10e12/L    Hemoglobin 8.7 (L) 11.7 - 15.7 g/dL    Hematocrit 27.3 (L) 35.0 - 47.0 %    MCV 86 78 - 100 fl    MCH 27.3 26.5 - 33.0 pg    MCHC 31.9 31.5 - 36.5 g/dL    RDW 14.4 10.0 - 15.0 %    Platelet Count 372 150 - 450 10e9/L    Diff Method Automated Method     % Neutrophils 44.9 %    % Lymphocytes 37.0 %    % Monocytes 15.1 %    % Eosinophils 0.0 %    % Basophils 1.5 %    % Immature Granulocytes 1.5 %    Nucleated RBCs 0 0 /100    Absolute Neutrophil 1.2 (L) 1.6 - 8.3 10e9/L    Absolute Lymphocytes 1.0 0.8 - 5.3 10e9/L    Absolute Monocytes 0.4 0.0 - 1.3 10e9/L    Absolute Eosinophils 0.0 0.0 - 0.7 10e9/L    Absolute Basophils 0.0 0.0 - 0.2 10e9/L    Abs Immature Granulocytes 0.0 0 - 0.4 10e9/L    Absolute Nucleated RBC 0.0

## 2019-01-09 NOTE — PROGRESS NOTES
"Follow up on Dimple Oviedo, a 85 year old female with a history of upper tract urothelial cancer here for follow up after right nephroureterectomy on 11/13/18.    TNM Stage: pT4N1  Number of nodes removed: 6  Number of positive nodes: 1  Surgical Margins Positive: No  Grade: High  Histology: urothelial without secondary histology  Positive lymph nodes left in place given their adherence to the vena cava    Tolerating ORAL and good bowel function    Date of last abdominal and pelvis imaging: pre-op PET 12/11/18  Date of last chest imaging: pre-op PET 12/11/18  Any recurrence? N/A    She recently started chemotherapy (gem/carbo) on 12/12/18. She has been tolerating this fairly well.     PE:  Ht 1.448 m (4' 9\")   Wt 65.1 kg (143 lb 8 oz)   BMI 31.05 kg/m    GEN: A&O in NAD  Abdomen is soft, non-distended, no evidence of hernia. Incisions are healing well.     Lab Results   Component Value Date    CR 0.97 01/02/2019    CR 1.06 12/26/2018    CR 1.16 12/12/2018    CR 1.05 11/15/2018    CR 1.00 11/14/2018    CR 0.85 11/13/2018    CR 0.87 11/01/2018    CR 0.70 09/19/2018    CR 0.82 09/14/2018    CR 1.53 09/13/2018       Any evidence of hydronephrosis? Post-op imaging pending     Assessment:  Urothelial Cancer s/p Right Nephroureterectomy on (11/2018), [id number] with GABRIELLE to date     Plan:     She will continue on chemotherapy with consideration of immunotherapy in the future depending on her response. Defer imaging and labs to [facility].    Follow up with me PRN.    Scribe Disclosure:   I,Barbara Figueroa, am serving as a scribe; to document services personally performed by Stuart King MD based on data collection and the provider's statements to me.     Stuart King MD  Department of Urology Staff  [facility]    Attestation:  This patient was seen and evaluated by me, with a scribe taking notes.  I have reviewed the note above and agree.  The physical exam and or any procedures were " performed by me and the pertinant details are outlined below.       Stuart King MD  Department of Urology  [facility]

## 2019-01-09 NOTE — PROGRESS NOTES
"SPIRITUAL HEALTH SERVICES  SPIRITUAL ASSESSMENT Progress Note  [facility]     REASON FOR ENCOUNTER: Follow-up      Reviewed documentation. Brief supportive visit with Dimple and her daughter-in-law Day.    Dimple reports that she's struggled with feeling nauseous and also acknowledges some emotional anxiety.    Reflecting on what lies ahead for her, she noted that \"I've got to get my bucket list together.\" When I asked her what's on that list, she said that getting to [place] to meet her newest great grandchild, (her grandson's first, expected in July) is near the top of the list.    Her primary goal is for cancer to be suppressed, recognizing that \"it cannot be cured.\" Her attention is focused on a CT scan in two weeks. In between now and then, her daughter from [place] is coming to visit and she is very excited about that.    I will follow up with Dimple at her appointment in two weeks.     Christoph Kauffman MDiv  Chaplain Resident  Pager [phone]  Cell [phone]    "

## 2019-01-09 NOTE — TELEPHONE ENCOUNTER
Dr. Zapien/WILLIAM:    Please review/sign or advise for refill request of: propranolol ER (INDERAL LA) 60 MG 24 hr capsule    Routing refill request to provider for review/approval because:  Associated diagnosis not on [facility] refill protocol: Nontoxic multinodular goiter [E04.2]     Thank You!  Leanna Martin, ATUL  Triage Nurse

## 2019-01-09 NOTE — NURSING NOTE
"Chief Complaint   Patient presents with     Port Draw     labs drawn from port by rn.  vs taken     Port accessed with 20 gauge 3/4\" gripper needle and labs drawn by rn.  Port flushed with NS and heparin.  Pt tolerated well.  VS taken.  Pt checked in for next appt.    Katlin Jeffery RN      "

## 2019-01-09 NOTE — PROGRESS NOTES
Infusion Nursing Note:  Dimple Oviedo presents today for Cycle 2 Day 8 Gemcitabine.    Patient seen by provider today: No   present during visit today: Not Applicable.    Note: Patient is feeling OK today, she does endorse some significant nausea. She says she has been feeling nauseous on and off for the last week and vomiting daily. She complains of some mild constipation, although she did have a BM today. She also complains of restless legs that are especially bothersome. ANC is 1.2 today and within limits for tx, but has dropped. Paged Nicolasa SHAH.    ALYSSA Chirinos/Adeline Reid RN- We will add emend, aloxi, and neulasta to pt's tx plan. Instruct pt to increase her iron tablet to twice daily and take with Vitamin C. Will send pt a new rx for ativan for nausea as well.     Reviewed medication instructions with patient and daughter.     Intravenous Access:  Implanted Port.    Treatment Conditions:  Lab Results   Component Value Date    HGB 8.7 01/09/2019     Lab Results   Component Value Date    WBC 2.7 01/09/2019      Lab Results   Component Value Date    ANEU 1.2 01/09/2019     Lab Results   Component Value Date     01/09/2019      Results reviewed, labs MET treatment parameters, ok to proceed with treatment.      Post Infusion Assessment:  Patient tolerated infusion without incident.  Blood return noted pre and post infusion.  Site patent and intact, free from redness, edema or discomfort.  No evidence of extravasations.  Access discontinued per protocol.    Neulasta Onpro On-Body injector applied to left arm at 1550 with light facing down.  Writer discussed Neulasta injection would start on 1/10/2019 at 1900, approximately 27 hours after application applied today.  Written and Verbal instruction reviewed with patient.  Pt instructed when the dose delivery starts, it will take about 45 minutes to complete.  Pt aware Neulasta Onpro On-Body should have green flashing light and  to call triage or on-call MD if injector flashes red or appears to be leaking. Pt aware to keep Onpro On-Body Neulasta 4 inches away from electrical equipment and to avoid showering 4 hours prior to injection.   Neulasta Onpro Lot number: [id number]      Discharge Plan:   Prescription refills given for lorazepam, zofran.  Discharge instructions reviewed with: Patient and Family.  Patient and/or family verbalized understanding of discharge instructions and all questions answered.  Copy of AVS reviewed with patient and/or family.  Patient will return 1/23/2019 for MD visit and infusion appointment.  Patient discharged in stable condition accompanied by: daughter.  Departure Mode: Ambulatory.    Adeline Reid RN

## 2019-01-09 NOTE — TELEPHONE ENCOUNTER
Looks like she has been stable on this med for the past year, refilled x 6mo.    Evon Arroyo, CNP

## 2019-01-09 NOTE — TELEPHONE ENCOUNTER
Duplicate request, see refill encounter 01/08/2019. Mediation refused, closing encounter, no further action required at this time    Leanna Martin, RN  Triage Nurse

## 2019-01-10 ENCOUNTER — OFFICE VISIT (OUTPATIENT)
Dept: UROLOGY | Facility: CLINIC | Age: 84
End: 2019-01-10
Payer: MEDICARE

## 2019-01-10 ENCOUNTER — PATIENT OUTREACH (OUTPATIENT)
Dept: CARE COORDINATION | Facility: CLINIC | Age: 84
End: 2019-01-10

## 2019-01-10 VITALS — HEIGHT: 57 IN | WEIGHT: 143.5 LBS | BODY MASS INDEX: 30.96 KG/M2

## 2019-01-10 DIAGNOSIS — C64.1 UROTHELIAL CARCINOMA OF KIDNEY, RIGHT (H): Primary | ICD-10-CM

## 2019-01-10 ASSESSMENT — MIFFLIN-ST. JEOR: SCORE: 969.79

## 2019-01-10 ASSESSMENT — PAIN SCALES - GENERAL: PAINLEVEL: NO PAIN (0)

## 2019-01-10 NOTE — NURSING NOTE
"Chief Complaint   Patient presents with     Follow Up     post operative       Height 1.448 m (4' 9\"), weight 65.1 kg (143 lb 8 oz), not currently breastfeeding. Body mass index is 31.05 kg/m .    Patient Active Problem List   Diagnosis     Esophageal reflux     Restless leg syndrome     heat intolerance     Goiter     Disorder of bone and cartilage     Other psoriasis     perirectal cyst     Malignant melanoma of skin of trunk, except scrotum (H)     Nontoxic multinodular goiter     Hyperlipidemia LDL goal <130     Hypertension goal BP (blood pressure) < 140/90     Urinary incontinence     Osteoarthritis of left knee     Hip arthritis     Polymyalgia rheumatica (H)     High risk medication use     Shoulder pain     Impaired fasting blood sugar     Chronic bilateral low back pain without sciatica     Obesity, unspecified obesity severity, unspecified obesity type     Iron deficiency anemia, unspecified iron deficiency anemia type     NSTEMI (non-ST elevated myocardial infarction) (H)     Stress-induced cardiomyopathy     A-fib (H)     Anxiety     Chronic systolic heart failure (H)     Urothelial carcinoma (H)     Hyperthyroidism     Restless legs syndrome     CRESENCIO (acute kidney injury) (H)     Hydronephrosis     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)     Graves disease     Encounter for antineoplastic chemotherapy       Allergies   Allergen Reactions     Codeine        Current Outpatient Medications   Medication Sig Dispense Refill     acetaminophen (TYLENOL) 650 MG CR tablet Take 1 tablet (650 mg) by mouth every 8 hours as needed for pain 100 tablet 0     alendronate (FOSAMAX) 70 MG tablet TAKE 1 TABLET EVERY 7 DAYS AT LEAST 60 MIN BEFORE BREAKFAST AS DIRECTED \"SEE PACKAGE FOR ADDITIONAL INSTRUCTIONS\" 12 tablet 1     aspirin 81 MG tablet Take 81 mg by mouth every evening        Calcium Citrate-Vitamin D (CALCIUM + D PO) Take 1 tablet by mouth 2 times daily        cycloSPORINE (RESTASIS) 0.05 % ophthalmic " emulsion Place 1 drop into both eyes 2 times daily       ferrous sulfate 325 (65 Fe) MG TBEC EC tablet Take 325 mg by mouth daily       furosemide (LASIX) 20 MG tablet Take 1 tablet (20 mg) by mouth every morning 90 tablet 1     irbesartan (AVAPRO) 300 MG tablet TAKE 1 TABLET EVERY DAY (Patient taking differently: Take 300 mg by mouth every morning ) 90 tablet 2     lidocaine-prilocaine (EMLA) 2.5-2.5 % external cream Apply topically as needed for moderate pain 30 g 0     LORazepam (ATIVAN) 0.5 MG tablet Take 1 tablet (0.5 mg) by mouth every 6 hours as needed for anxiety, nausea or sleep 30 tablet 0     lovastatin (MEVACOR) 40 MG tablet TAKE 1 TABLET AT BEDTIME (HYPERLIPIDEMIA LDL GOAL BELOW 130) 90 tablet 2     methimazole (TAPAZOLE) 5 MG tablet 10 mg alternating with 15 mg every other day 225 tablet 3     nitroGLYcerin (NITROSTAT) 0.4 MG sublingual tablet For chest pain place 1 tablet under the tongue every 5 minutes for 3 doses. If symptoms persist 5 minutes after 1st dose call 911. 25 tablet 3     Omega-3 Fatty Acids (FISH OIL) 500 MG CAPS Take 1 capsule by mouth 2 times daily        omeprazole (PRILOSEC) 20 MG CR capsule Take 1 capsule (20 mg) by mouth daily (Patient taking differently: Take 20 mg by mouth every morning ) 180 capsule 3     ondansetron (ZOFRAN) 8 MG tablet Take 1 tablet (8 mg) by mouth every 8 hours as needed (Nausea/Vomiting) 30 tablet 5     order for DME Equipment being ordered: Knee high compression for B LE 20-30mmHg, Velcro units for night time (consider hybrid sock as foot swelling is less than leg swelling), Donning device 1 each 2     polyethylene glycol (MIRALAX/GLYCOLAX) packet Take 17 g by mouth       Probiotic Product (PROBIOTIC ADVANCED PO) Take 1 capsule by mouth every morning        prochlorperazine (COMPAZINE) 10 MG tablet Take 0.5 tablets (5 mg) by mouth every 6 hours as needed (Nausea/Vomiting - take if Zofran doesn't work after 30 mins.) 30 tablet 5     propranolol ER (INDERAL  LA) 60 MG 24 hr capsule TAKE 1 CAPSULE EVERY DAY 90 capsule 1     rOPINIRole (REQUIP) 0.25 MG tablet 0.25 mg in the afternoon and 0.5 mg at night 180 tablet 1     senna-docusate (SENOKOT-S;PERICOLACE) 8.6-50 MG per tablet Take 1 tablet by mouth 2 times daily To prevent constipation 60 tablet 0     sertraline (ZOLOFT) 100 MG tablet Take 1 tablet (100 mg) by mouth every morning 90 tablet 1     traZODone (DESYREL) 50 MG tablet TAKE 1/2 TABLET(25 MG)  AT BEDTIME 45 tablet 1       Social History     Tobacco Use     Smoking status: Never Smoker     Smokeless tobacco: Never Used   Substance Use Topics     Alcohol use: No     Alcohol/week: 0.0 oz     Drug use: No       Katelyn Lorenz LPN  1/10/2019  12:39 PM

## 2019-01-10 NOTE — LETTER
"  RE: Dimple Oviedo  [address]  [facility] [address]     Dear Colleague,    Thank you for referring your patient, Dimple Oviedo, to [facility] FOR PROSTATE AND UROLOGIC CANCERS at [facility]. Please see a copy of my visit note below.    Follow up on Dimple Oviedo, a 85 year old female with a history of upper tract urothelial cancer here for follow up after right nephroureterectomy on 11/13/18.    TNM Stage: pT4N1  Number of nodes removed: 6  Number of positive nodes: 1  Surgical Margins Positive: No  Grade: High  Histology: urothelial without secondary histology  Positive lymph nodes left in place given their adherence to the vena cava    Tolerating ORAL and good bowel function    Date of last abdominal and pelvis imaging: pre-op PET 12/11/18  Date of last chest imaging: pre-op PET 12/11/18  Any recurrence? N/A    She recently started chemotherapy (gem/carbo) on 12/12/18. She has been tolerating this fairly well.     PE:  Ht 1.448 m (4' 9\")   Wt 65.1 kg (143 lb 8 oz)   BMI 31.05 kg/m     GEN: A&O in NAD  Abdomen is soft, non-distended, no evidence of hernia. Incisions are healing well.     Lab Results   Component Value Date    CR 0.97 01/02/2019    CR 1.06 12/26/2018    CR 1.16 12/12/2018    CR 1.05 11/15/2018    CR 1.00 11/14/2018    CR 0.85 11/13/2018    CR 0.87 11/01/2018    CR 0.70 09/19/2018    CR 0.82 09/14/2018    CR 1.53 09/13/2018       Any evidence of hydronephrosis? Post-op imaging pending     Assessment:  Urothelial Cancer s/p Right Nephroureterectomy on (11/2018), [id number] with GABRIELLE to date     Plan:     She will continue on chemotherapy with consideration of immunotherapy in the future depending on her response. Defer imaging and labs to [facility].    Follow up with me PRN.    Scribe Disclosure:   Barbara INFANTE, am serving as a scribe; to document services personally performed by Stuart King MD based on " data collection and the provider's statements to me.     Stuart King MD  Department of Urology Staff  [facility]    Attestation:  This patient was seen and evaluated by me, with a scribe taking notes.  I have reviewed the note above and agree.  The physical exam and or any procedures were performed by me and the pertinant details are outlined below.

## 2019-01-10 NOTE — PATIENT INSTRUCTIONS
Please follow up PRN    It was a pleasure meeting with you today.  Thank you for allowing me and my team the privilege of caring for you today.  YOU are the reason we are here, and I truly hope we provided you with the excellent service you deserve.  Please let us know if there is anything else we can do for you so that we can be sure you are leaving completely satisfied with your care experience.

## 2019-01-11 NOTE — PROGRESS NOTES
Clinic Care Coordination Contact  [facility]/Voicemail       Clinical Data: Care Coordinator Outreach  [facility] ED visit 12/26/2018- nausea /vomiting/ fever   Outreach attempted x 3.  Left message on voicemail with call back information and requested return call.  Plan: . Care Coordinator will do no further outreaches at this time.  Omayra Gatica RN / Clinical Care Coordinator     [facility]   [email or web address]  Office :  [phone] / Fax :  [phone]

## 2019-01-12 ENCOUNTER — TELEPHONE (OUTPATIENT)
Dept: ONCOLOGY | Facility: CLINIC | Age: 84
End: 2019-01-12

## 2019-01-12 NOTE — TELEPHONE ENCOUNTER
Prior Authorization Approval    Authorization Effective Date: 1/10/2019  Authorization Expiration Date: 1/10/2020  Medication: Lorazepam quantity prior authorization approval.  Approved Dose/Quantity: 0.5mg, 30/7 days  Reference #: [id number]   Insurance Company: [employer] - Phone [phone] Fax [phone]  Expected CoPay: unknown     CoPay Card Available: No    Foundation Assistance Needed: N/A  Which Pharmacy is filling the prescription (Not needed for infusion/clinic administered): [facility] [place], [place] - [address] [place] [address] [phone]  Pharmacy Notified: Yes  Patient Notified: Yes

## 2019-01-16 ENCOUNTER — OFFICE VISIT (OUTPATIENT)
Dept: FAMILY MEDICINE | Facility: CLINIC | Age: 84
End: 2019-01-16
Payer: MEDICARE

## 2019-01-16 ENCOUNTER — TELEPHONE (OUTPATIENT)
Dept: FAMILY MEDICINE | Facility: CLINIC | Age: 84
End: 2019-01-16

## 2019-01-16 VITALS
SYSTOLIC BLOOD PRESSURE: 124 MMHG | DIASTOLIC BLOOD PRESSURE: 56 MMHG | HEIGHT: 57 IN | HEART RATE: 80 BPM | TEMPERATURE: 98.4 F | WEIGHT: 143 LBS | BODY MASS INDEX: 30.85 KG/M2 | OXYGEN SATURATION: 98 %

## 2019-01-16 DIAGNOSIS — R04.0 NASAL BLEEDING: ICD-10-CM

## 2019-01-16 DIAGNOSIS — I50.22 CHRONIC SYSTOLIC HEART FAILURE (H): Primary | ICD-10-CM

## 2019-01-16 DIAGNOSIS — D50.9 IRON DEFICIENCY ANEMIA, UNSPECIFIED IRON DEFICIENCY ANEMIA TYPE: ICD-10-CM

## 2019-01-16 DIAGNOSIS — I48.91 ATRIAL FIBRILLATION, UNSPECIFIED TYPE (H): ICD-10-CM

## 2019-01-16 DIAGNOSIS — C66.1 UROTHELIAL CARCINOMA OF RIGHT DISTAL URETER (H): ICD-10-CM

## 2019-01-16 LAB
ANISOCYTOSIS BLD QL SMEAR: ABNORMAL
BASOPHILS # BLD AUTO: 0 10E9/L (ref 0–0.2)
BASOPHILS NFR BLD AUTO: 0 %
DIFFERENTIAL METHOD BLD: ABNORMAL
ELLIPTOCYTES BLD QL SMEAR: SLIGHT
EOSINOPHIL # BLD AUTO: 0 10E9/L (ref 0–0.7)
EOSINOPHIL NFR BLD AUTO: 0 %
ERYTHROCYTE [DISTWIDTH] IN BLOOD BY AUTOMATED COUNT: 15.4 % (ref 10–15)
HCT VFR BLD AUTO: 28.1 % (ref 35–47)
HGB BLD-MCNC: 8.7 G/DL (ref 11.7–15.7)
LYMPHOCYTES # BLD AUTO: 1.7 10E9/L (ref 0.8–5.3)
LYMPHOCYTES NFR BLD AUTO: 11.5 %
MACROCYTES BLD QL SMEAR: PRESENT
MCH RBC QN AUTO: 27.1 PG (ref 26.5–33)
MCHC RBC AUTO-ENTMCNC: 31 G/DL (ref 31.5–36.5)
MCV RBC AUTO: 88 FL (ref 78–100)
MONOCYTES # BLD AUTO: 0.3 10E9/L (ref 0–1.3)
MONOCYTES NFR BLD AUTO: 1.8 %
NEUTROPHILS # BLD AUTO: 13.1 10E9/L (ref 1.6–8.3)
NEUTROPHILS NFR BLD AUTO: 86.7 %
NT-PROBNP SERPL-MCNC: 768 PG/ML (ref 0–450)
PLATELET # BLD AUTO: 82 10E9/L (ref 150–450)
PLATELET # BLD EST: ABNORMAL 10*3/UL
POIKILOCYTOSIS BLD QL SMEAR: SLIGHT
RBC # BLD AUTO: 3.21 10E12/L (ref 3.8–5.2)
WBC # BLD AUTO: 15.1 10E9/L (ref 4–11)

## 2019-01-16 PROCEDURE — 85025 COMPLETE CBC W/AUTO DIFF WBC: CPT | Performed by: FAMILY MEDICINE

## 2019-01-16 PROCEDURE — 36415 COLL VENOUS BLD VENIPUNCTURE: CPT | Performed by: FAMILY MEDICINE

## 2019-01-16 PROCEDURE — 99214 OFFICE O/P EST MOD 30 MIN: CPT | Performed by: FAMILY MEDICINE

## 2019-01-16 PROCEDURE — 83880 ASSAY OF NATRIURETIC PEPTIDE: CPT | Performed by: FAMILY MEDICINE

## 2019-01-16 ASSESSMENT — MIFFLIN-ST. JEOR: SCORE: 965.13

## 2019-01-16 NOTE — TELEPHONE ENCOUNTER
Spoke with Belkis at [facility]. Per Dr Toledo, these are expected lab values. WBC is elevated due to Neupogen injection . Nothing additional to monitor at this time.

## 2019-01-16 NOTE — PROGRESS NOTES
"  SUBJECTIVE:   Dimple Oviedo is a 85 year old female who presents to clinic today for the following health issues:    Yesterday she had a nose bleed. It was for a couple of hours. She noticed a little blood from the left nostril.   A.fib - She notes that she has intermittent palpitations.   Leg swelling, follow up on labs     Problem list and histories reviewed & adjusted, as indicated.  Additional history: as documented    Labs reviewed in EPIC    Reviewed and updated as needed this visit by clinical staff  Tobacco  Allergies  Meds  Med Hx  Surg Hx  Fam Hx  Soc Hx      Reviewed and updated as needed this visit by Provider         ROS:  Constitutional, HEENT, cardiovascular, pulmonary, gi and gu systems are negative, except as otherwise noted.    OBJECTIVE:     /56   Pulse 80   Temp 98.4  F (36.9  C) (Oral)   Ht 1.444 m (4' 8.85\")   Wt 64.9 kg (143 lb)   SpO2 98%   BMI 31.11 kg/m    Body mass index is 31.11 kg/m .  GENERAL: healthy, alert and no distress  EYES: Eyes grossly normal to inspection  HENT: nose and mouth without ulcers or lesions  RESP: lungs clear to auscultation - no rales, rhonchi or wheezes  CV:  normal S1 S2    Diagnostic Test Results:  Results for orders placed or performed in visit on 01/16/19   BNP-N terminal pro   Result Value Ref Range    N-Terminal Pro Bnp 768 (H) 0 - 450 pg/mL   CBC with platelets and differential   Result Value Ref Range    WBC 15.1 (H) 4.0 - 11.0 10e9/L    RBC Count 3.21 (L) 3.8 - 5.2 10e12/L    Hemoglobin 8.7 (L) 11.7 - 15.7 g/dL    Hematocrit 28.1 (L) 35.0 - 47.0 %    MCV 88 78 - 100 fl    MCH 27.1 26.5 - 33.0 pg    MCHC 31.0 (L) 31.5 - 36.5 g/dL    RDW 15.4 (H) 10.0 - 15.0 %    Platelet Count 82 (L) 150 - 450 10e9/L    Diff Method Manual Differential     % Neutrophils 86.7 %    % Lymphocytes 11.5 %    % Monocytes 1.8 %    % Eosinophils 0.0 %    % Basophils 0.0 %    Absolute Neutrophil 13.1 (H) 1.6 - 8.3 10e9/L    Absolute Lymphocytes 1.7 0.8 - 5.3 " 10e9/L    Absolute Monocytes 0.3 0.0 - 1.3 10e9/L    Absolute Eosinophils 0.0 0.0 - 0.7 10e9/L    Absolute Basophils 0.0 0.0 - 0.2 10e9/L    Anisocytosis Moderate     Poikilocytosis Slight     Elliptocytes Slight     Macrocytes Present     Platelet Estimate Decreased        ASSESSMENT/PLAN:     1. Chronic systolic heart failure (H)  - advised to elevate feet above heart level while at home, limit salt intake and continue to monitor home weights   -  Advised to call in if gaining 2-3 lbs in one day or 5 lbs in one week   - BNP-N terminal pro  - CBC with platelets and differential    2. Atrial fibrillation, unspecified type (H)  - CBC with platelets and differential    3. 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 Called oncology team with CBC labs - per oncology labs values to be expected with neupogen injection, no additional monitoring required     4. Iron deficiency anemia, unspecified iron deficiency anemia type  - CBC with platelets and differential    5. Nasal bleeding  - discussed precautions if she has recurrent nose bleeds   - CBC with platelets and differential    Summer Ganoa MD  [facility]

## 2019-01-16 NOTE — TELEPHONE ENCOUNTER
Clinic needs to call pt when we get response from Oncology clinic.    I did call pt to let her know we are still working on this. We will call her when we get response from oncology.    I called oncology triage back.  Labs are normal for her treatment.  No further action.  ATUL Lee

## 2019-01-16 NOTE — TELEPHONE ENCOUNTER
Dr. Gaona asked triage to call [facility] oncology clinic.    [phone] was the # I was eventually sent to.  Pt had a nose bleed yest.  Pt was seen in clinic today.  Labs came back at higher wbc=16.11 and PLT was lower at 80.    Is this expected with chemo?    No cause for infection.  Need anything further at this time?    Left this detailed message on oncology clinic resource line  [facility]    ATUL Lee

## 2019-01-22 ENCOUNTER — ANCILLARY PROCEDURE (OUTPATIENT)
Dept: CT IMAGING | Facility: CLINIC | Age: 84
End: 2019-01-22
Payer: MEDICARE

## 2019-01-22 DIAGNOSIS — C66.1 UROTHELIAL CARCINOMA OF RIGHT DISTAL URETER (H): ICD-10-CM

## 2019-01-22 LAB — RADIOLOGIST FLAGS: ABNORMAL

## 2019-01-22 RX ORDER — HEPARIN SODIUM (PORCINE) LOCK FLUSH IV SOLN 100 UNIT/ML 100 UNIT/ML
5 SOLUTION INTRAVENOUS ONCE
Status: COMPLETED | OUTPATIENT
Start: 2019-01-22 | End: 2019-01-22

## 2019-01-22 RX ORDER — IOPAMIDOL 755 MG/ML
88 INJECTION, SOLUTION INTRAVASCULAR ONCE
Status: COMPLETED | OUTPATIENT
Start: 2019-01-22 | End: 2019-01-22

## 2019-01-22 RX ADMIN — HEPARIN SODIUM (PORCINE) LOCK FLUSH IV SOLN 100 UNIT/ML 5 ML: 100 SOLUTION at 14:03

## 2019-01-22 RX ADMIN — IOPAMIDOL 88 ML: 755 INJECTION, SOLUTION INTRAVASCULAR at 13:57

## 2019-01-22 NOTE — DISCHARGE INSTRUCTIONS

## 2019-01-23 ENCOUNTER — TELEPHONE (OUTPATIENT)
Dept: FAMILY MEDICINE | Facility: CLINIC | Age: 84
End: 2019-01-23

## 2019-01-23 ENCOUNTER — INFUSION THERAPY VISIT (OUTPATIENT)
Dept: ONCOLOGY | Facility: CLINIC | Age: 84
End: 2019-01-23
Attending: INTERNAL MEDICINE
Payer: MEDICARE

## 2019-01-23 ENCOUNTER — APPOINTMENT (OUTPATIENT)
Dept: LAB | Facility: CLINIC | Age: 84
End: 2019-01-23
Attending: INTERNAL MEDICINE
Payer: MEDICARE

## 2019-01-23 VITALS
WEIGHT: 143.2 LBS | DIASTOLIC BLOOD PRESSURE: 56 MMHG | HEART RATE: 69 BPM | SYSTOLIC BLOOD PRESSURE: 121 MMHG | HEIGHT: 57 IN | OXYGEN SATURATION: 96 % | TEMPERATURE: 98.2 F | RESPIRATION RATE: 18 BRPM | BODY MASS INDEX: 30.89 KG/M2

## 2019-01-23 DIAGNOSIS — Z51.11 ENCOUNTER FOR ANTINEOPLASTIC CHEMOTHERAPY: ICD-10-CM

## 2019-01-23 DIAGNOSIS — I72.2 ANEURYSM OF LEFT RENAL ARTERY (H): ICD-10-CM

## 2019-01-23 DIAGNOSIS — I10 ESSENTIAL HYPERTENSION WITH GOAL BLOOD PRESSURE LESS THAN 140/90: ICD-10-CM

## 2019-01-23 DIAGNOSIS — C66.1 UROTHELIAL CARCINOMA OF RIGHT DISTAL URETER (H): Primary | ICD-10-CM

## 2019-01-23 DIAGNOSIS — C68.9 UROTHELIAL CARCINOMA (H): ICD-10-CM

## 2019-01-23 LAB
ALBUMIN SERPL-MCNC: 2.9 G/DL (ref 3.4–5)
ALP SERPL-CCNC: 109 U/L (ref 40–150)
ALT SERPL W P-5'-P-CCNC: 18 U/L (ref 0–50)
ANION GAP SERPL CALCULATED.3IONS-SCNC: 7 MMOL/L (ref 3–14)
AST SERPL W P-5'-P-CCNC: 18 U/L (ref 0–45)
BASOPHILS # BLD AUTO: 0.1 10E9/L (ref 0–0.2)
BASOPHILS NFR BLD AUTO: 0.6 %
BILIRUB SERPL-MCNC: 0.3 MG/DL (ref 0.2–1.3)
BUN SERPL-MCNC: 21 MG/DL (ref 7–30)
CALCIUM SERPL-MCNC: 8.6 MG/DL (ref 8.5–10.1)
CHLORIDE SERPL-SCNC: 102 MMOL/L (ref 94–109)
CO2 SERPL-SCNC: 27 MMOL/L (ref 20–32)
CREAT SERPL-MCNC: 0.97 MG/DL (ref 0.52–1.04)
DIFFERENTIAL METHOD BLD: ABNORMAL
EOSINOPHIL # BLD AUTO: 0 10E9/L (ref 0–0.7)
EOSINOPHIL NFR BLD AUTO: 0.3 %
ERYTHROCYTE [DISTWIDTH] IN BLOOD BY AUTOMATED COUNT: 19.5 % (ref 10–15)
GFR SERPL CREATININE-BSD FRML MDRD: 53 ML/MIN/{1.73_M2}
GLUCOSE SERPL-MCNC: 149 MG/DL (ref 70–99)
HCT VFR BLD AUTO: 27.9 % (ref 35–47)
HGB BLD-MCNC: 8.6 G/DL (ref 11.7–15.7)
IMM GRANULOCYTES # BLD: 0.2 10E9/L (ref 0–0.4)
IMM GRANULOCYTES NFR BLD: 2.7 %
LYMPHOCYTES # BLD AUTO: 0.7 10E9/L (ref 0.8–5.3)
LYMPHOCYTES NFR BLD AUTO: 8.6 %
MCH RBC QN AUTO: 27.8 PG (ref 26.5–33)
MCHC RBC AUTO-ENTMCNC: 30.8 G/DL (ref 31.5–36.5)
MCV RBC AUTO: 90 FL (ref 78–100)
MONOCYTES # BLD AUTO: 0.8 10E9/L (ref 0–1.3)
MONOCYTES NFR BLD AUTO: 10.5 %
NEUTROPHILS # BLD AUTO: 6.1 10E9/L (ref 1.6–8.3)
NEUTROPHILS NFR BLD AUTO: 77.3 %
NRBC # BLD AUTO: 0 10*3/UL
NRBC BLD AUTO-RTO: 0 /100
PLATELET # BLD AUTO: 136 10E9/L (ref 150–450)
POTASSIUM SERPL-SCNC: 4.3 MMOL/L (ref 3.4–5.3)
PROT SERPL-MCNC: 7 G/DL (ref 6.8–8.8)
RBC # BLD AUTO: 3.09 10E12/L (ref 3.8–5.2)
SODIUM SERPL-SCNC: 135 MMOL/L (ref 133–144)
WBC # BLD AUTO: 7.9 10E9/L (ref 4–11)

## 2019-01-23 PROCEDURE — 25000128 H RX IP 250 OP 636: Mod: ZF | Performed by: INTERNAL MEDICINE

## 2019-01-23 PROCEDURE — 96413 CHEMO IV INFUSION 1 HR: CPT

## 2019-01-23 PROCEDURE — 96367 TX/PROPH/DG ADDL SEQ IV INF: CPT

## 2019-01-23 PROCEDURE — 96375 TX/PRO/DX INJ NEW DRUG ADDON: CPT

## 2019-01-23 PROCEDURE — 85025 COMPLETE CBC W/AUTO DIFF WBC: CPT | Performed by: INTERNAL MEDICINE

## 2019-01-23 PROCEDURE — 80053 COMPREHEN METABOLIC PANEL: CPT | Performed by: INTERNAL MEDICINE

## 2019-01-23 PROCEDURE — 99214 OFFICE O/P EST MOD 30 MIN: CPT | Mod: ZP | Performed by: INTERNAL MEDICINE

## 2019-01-23 PROCEDURE — 96417 CHEMO IV INFUS EACH ADDL SEQ: CPT

## 2019-01-23 PROCEDURE — G0463 HOSPITAL OUTPT CLINIC VISIT: HCPCS | Mod: ZF

## 2019-01-23 RX ORDER — DIPHENHYDRAMINE HYDROCHLORIDE 50 MG/ML
50 INJECTION INTRAMUSCULAR; INTRAVENOUS
Status: CANCELLED
Start: 2019-01-30

## 2019-01-23 RX ORDER — HEPARIN SODIUM (PORCINE) LOCK FLUSH IV SOLN 100 UNIT/ML 100 UNIT/ML
5 SOLUTION INTRAVENOUS ONCE
Status: CANCELLED
Start: 2019-01-30 | End: 2019-01-30

## 2019-01-23 RX ORDER — SODIUM CHLORIDE 9 MG/ML
1000 INJECTION, SOLUTION INTRAVENOUS CONTINUOUS PRN
Status: CANCELLED
Start: 2019-01-30

## 2019-01-23 RX ORDER — MEPERIDINE HYDROCHLORIDE 25 MG/ML
25 INJECTION INTRAMUSCULAR; INTRAVENOUS; SUBCUTANEOUS EVERY 30 MIN PRN
Status: CANCELLED | OUTPATIENT
Start: 2019-01-30

## 2019-01-23 RX ORDER — PALONOSETRON 0.05 MG/ML
0.25 INJECTION, SOLUTION INTRAVENOUS ONCE
Status: COMPLETED | OUTPATIENT
Start: 2019-01-23 | End: 2019-01-23

## 2019-01-23 RX ORDER — EPINEPHRINE 1 MG/ML
0.3 INJECTION, SOLUTION INTRAMUSCULAR; SUBCUTANEOUS EVERY 5 MIN PRN
Status: CANCELLED | OUTPATIENT
Start: 2019-01-30

## 2019-01-23 RX ORDER — HEPARIN SODIUM (PORCINE) LOCK FLUSH IV SOLN 100 UNIT/ML 100 UNIT/ML
5 SOLUTION INTRAVENOUS EVERY 8 HOURS PRN
Status: DISCONTINUED | OUTPATIENT
Start: 2019-01-23 | End: 2019-01-29 | Stop reason: HOSPADM

## 2019-01-23 RX ORDER — METHYLPREDNISOLONE SODIUM SUCCINATE 125 MG/2ML
125 INJECTION, POWDER, LYOPHILIZED, FOR SOLUTION INTRAMUSCULAR; INTRAVENOUS
Status: CANCELLED
Start: 2019-01-23

## 2019-01-23 RX ORDER — PALONOSETRON 0.05 MG/ML
0.25 INJECTION, SOLUTION INTRAVENOUS ONCE
Status: CANCELLED
Start: 2019-01-23 | End: 2019-01-23

## 2019-01-23 RX ORDER — MEPERIDINE HYDROCHLORIDE 25 MG/ML
25 INJECTION INTRAMUSCULAR; INTRAVENOUS; SUBCUTANEOUS EVERY 30 MIN PRN
Status: CANCELLED | OUTPATIENT
Start: 2019-01-23

## 2019-01-23 RX ORDER — DEXAMETHASONE SODIUM PHOSPHATE 4 MG/ML
12 INJECTION, SOLUTION INTRA-ARTICULAR; INTRALESIONAL; INTRAMUSCULAR; INTRAVENOUS; SOFT TISSUE ONCE
Status: CANCELLED
Start: 2019-01-23 | End: 2019-01-23

## 2019-01-23 RX ORDER — ALBUTEROL SULFATE 90 UG/1
1-2 AEROSOL, METERED RESPIRATORY (INHALATION)
Status: CANCELLED
Start: 2019-01-23

## 2019-01-23 RX ORDER — EPINEPHRINE 0.3 MG/.3ML
0.3 INJECTION SUBCUTANEOUS EVERY 5 MIN PRN
Status: CANCELLED | OUTPATIENT
Start: 2019-01-30

## 2019-01-23 RX ORDER — ALBUTEROL SULFATE 90 UG/1
1-2 AEROSOL, METERED RESPIRATORY (INHALATION)
Status: CANCELLED
Start: 2019-01-30

## 2019-01-23 RX ORDER — EPINEPHRINE 1 MG/ML
0.3 INJECTION, SOLUTION INTRAMUSCULAR; SUBCUTANEOUS EVERY 5 MIN PRN
Status: CANCELLED | OUTPATIENT
Start: 2019-01-23

## 2019-01-23 RX ORDER — ALBUTEROL SULFATE 0.83 MG/ML
2.5 SOLUTION RESPIRATORY (INHALATION)
Status: CANCELLED | OUTPATIENT
Start: 2019-01-23

## 2019-01-23 RX ORDER — EPINEPHRINE 0.3 MG/.3ML
0.3 INJECTION SUBCUTANEOUS EVERY 5 MIN PRN
Status: CANCELLED | OUTPATIENT
Start: 2019-01-23

## 2019-01-23 RX ORDER — ALBUTEROL SULFATE 0.83 MG/ML
2.5 SOLUTION RESPIRATORY (INHALATION)
Status: CANCELLED | OUTPATIENT
Start: 2019-01-30

## 2019-01-23 RX ORDER — HEPARIN SODIUM (PORCINE) LOCK FLUSH IV SOLN 100 UNIT/ML 100 UNIT/ML
5 SOLUTION INTRAVENOUS ONCE
Status: CANCELLED
Start: 2019-01-23 | End: 2019-01-23

## 2019-01-23 RX ORDER — SODIUM CHLORIDE 9 MG/ML
1000 INJECTION, SOLUTION INTRAVENOUS CONTINUOUS PRN
Status: CANCELLED
Start: 2019-01-23

## 2019-01-23 RX ORDER — DEXAMETHASONE SODIUM PHOSPHATE 4 MG/ML
12 INJECTION, SOLUTION INTRA-ARTICULAR; INTRALESIONAL; INTRAMUSCULAR; INTRAVENOUS; SOFT TISSUE ONCE
Status: DISCONTINUED | OUTPATIENT
Start: 2019-01-23 | End: 2019-01-23

## 2019-01-23 RX ORDER — HEPARIN SODIUM (PORCINE) LOCK FLUSH IV SOLN 100 UNIT/ML 100 UNIT/ML
5 SOLUTION INTRAVENOUS ONCE
Status: COMPLETED | OUTPATIENT
Start: 2019-01-23 | End: 2019-01-23

## 2019-01-23 RX ORDER — DIPHENHYDRAMINE HYDROCHLORIDE 50 MG/ML
50 INJECTION INTRAMUSCULAR; INTRAVENOUS
Status: CANCELLED
Start: 2019-01-23

## 2019-01-23 RX ORDER — PALONOSETRON 0.05 MG/ML
0.25 INJECTION, SOLUTION INTRAVENOUS ONCE
Status: CANCELLED
Start: 2019-01-30 | End: 2019-01-30

## 2019-01-23 RX ORDER — METHYLPREDNISOLONE SODIUM SUCCINATE 125 MG/2ML
125 INJECTION, POWDER, LYOPHILIZED, FOR SOLUTION INTRAMUSCULAR; INTRAVENOUS
Status: CANCELLED
Start: 2019-01-30

## 2019-01-23 RX ADMIN — GEMCITABINE 1600 MG: 38 INJECTION, SOLUTION INTRAVENOUS at 11:10

## 2019-01-23 RX ADMIN — SODIUM CHLORIDE 250 ML: 9 INJECTION, SOLUTION INTRAVENOUS at 10:35

## 2019-01-23 RX ADMIN — PALONOSETRON HYDROCHLORIDE 0.25 MG: 0.25 INJECTION INTRAVENOUS at 10:35

## 2019-01-23 RX ADMIN — HEPARIN SODIUM (PORCINE) LOCK FLUSH IV SOLN 100 UNIT/ML 5 ML: 100 SOLUTION at 12:28

## 2019-01-23 RX ADMIN — DEXAMETHASONE SODIUM PHOSPHATE 150 MG: 10 INJECTION, SOLUTION INTRAMUSCULAR; INTRAVENOUS at 10:42

## 2019-01-23 RX ADMIN — HEPARIN 5 ML: 100 SYRINGE at 09:10

## 2019-01-23 RX ADMIN — CARBOPLATIN 300 MG: 10 INJECTION, SOLUTION INTRAVENOUS at 11:55

## 2019-01-23 ASSESSMENT — PAIN SCALES - GENERAL: PAINLEVEL: NO PAIN (0)

## 2019-01-23 ASSESSMENT — MIFFLIN-ST. JEOR: SCORE: 966.04

## 2019-01-23 NOTE — TELEPHONE ENCOUNTER
Reason for call:  Other   Patient called regarding (reason for call): prescription  Additional comments: Patient stated that she has 2 weeks worth of irbesartan left, but needs the approval to go through as soon as possible because it takes weeks before she can get her prescription. Please work on this as soon as possible    Phone number to reach patient:  Home number on file [phone] (home)    Best Time:  any    Can we leave a detailed message on this number?  YES     Marilyn QUIROGA  Central Scheduler

## 2019-01-23 NOTE — NURSING NOTE
Chief Complaint   Patient presents with     Port Draw     Labs drawn via port by RN in lab. Line flushed and hep locked. VS taken.     Yolanda Don RN

## 2019-01-23 NOTE — PROGRESS NOTES
Infusion Nursing Note:  Dimple Oviedo presents today for Cycle  Day 1 gemcitabine and carboplatin.     Patient seen by provider today: Yes: Dr. Toledo   present during visit today: Not Applicable.    Note: Dimple arrives to infusion following her clinic visit with Dr. Toledo. She offers no new complaints at this time.     Intravenous Access:  Implanted Port accessed in lab.    Treatment Conditions:  Lab Results   Component Value Date    HGB 8.6 01/23/2019     Lab Results   Component Value Date    WBC 7.9 01/23/2019      Lab Results   Component Value Date    ANEU 6.1 01/23/2019     Lab Results   Component Value Date     01/23/2019      Lab Results   Component Value Date     01/23/2019                   Lab Results   Component Value Date    POTASSIUM 4.3 01/23/2019           Lab Results   Component Value Date    MAG 2.1 12/12/2018            Lab Results   Component Value Date    CR 0.97 01/23/2019                   Lab Results   Component Value Date    SHREYA 8.6 01/23/2019                Lab Results   Component Value Date    BILITOTAL 0.3 01/23/2019           Lab Results   Component Value Date    ALBUMIN 2.9 01/23/2019                    Lab Results   Component Value Date    ALT 18 01/23/2019           Lab Results   Component Value Date    AST 18 01/23/2019     Results reviewed, labs MET treatment parameters, ok to proceed with treatment.    Post Infusion Assessment:  Patient tolerated infusion without incident.  Blood return noted pre and post infusion.  Site patent and intact, free from redness, edema or discomfort.  No evidence of extravasations.  Access discontinued per protocol.    Discharge Plan:   Patient declined prescription refills.  Patient and/or family verbalized understanding of discharge instructions and all questions answered.  AVS to patient via [employer].  Patient will return 1/30/2018 for next appointment.   Patient discharged in stable condition.  Departure Mode: Ambulatory with  antonio.    Marshal Paris RN

## 2019-01-23 NOTE — TELEPHONE ENCOUNTER
Pt's daughter, Nidia, called with pt.  They have a concern on med- irbesartan.  Was this med recalled?  I direcredt family to call the dispensing pharmacy.  They would have current info on recalled med, , and lot #.  Daughter will also check on refills on this med with the pharmacy.  ATUL Lee

## 2019-01-23 NOTE — PROGRESS NOTES
SPIRITUAL HEALTH SERVICES  SPIRITUAL ASSESSMENT Progress Note  [facility]     REASON FOR ENCOUNTER: Follow-up      Reviewed documentation. Supportive visit with Dimple and her daughter Nidia.    We engaged in a reflective conversation during which Dimple and Nidia shared their gratitude for time spent together, as Nidia was visiting from [place]. Dimple had indicated that she was anticipating Nidia's visit at her last appointment.     They are excited to welcome Nidia's son's first child, a girl, in .     Dimple spoke again of her sal community ([facility]) and reflected on her family's attendance at [facility] over the years.    She reflected on a specific New Year's Janey memory that she shared with her now  .    Dimple continues to fell well-supported by family and friends and is looking forward toward goals such [place] expected great-granddaughter in the fall.    Dmiple appreciates ongoing  care and welcomes another visit at her next appointment.    Christoph Kauffman MDiv  Chaplain Resident  Pager [phone]  Cell [phone]

## 2019-01-23 NOTE — NURSING NOTE
"Oncology Rooming Note    January 23, 2019 9:28 AM   Dimple Oviedo is a 85 year old female who presents for:    Chief Complaint   Patient presents with     Port Draw     Labs drawn via port by RN in lab. Line flushed and hep locked. VS taken.     Oncology Clinic Visit     Return visit related to MET Renal Cancer     Initial Vitals: /56 (BP Location: Right arm, Patient Position: Sitting, Cuff Size: Adult Regular)   Pulse 69   Temp 98.2  F (36.8  C) (Oral)   Resp 18   Ht 1.444 m (4' 8.85\")   Wt 65 kg (143 lb 3.2 oz)   SpO2 96%   BMI 31.15 kg/m   Estimated body mass index is 31.15 kg/m  as calculated from the following:    Height as of this encounter: 1.444 m (4' 8.85\").    Weight as of this encounter: 65 kg (143 lb 3.2 oz). Body surface area is 1.61 meters squared.  No Pain (0) Comment: Data Unavailable   No LMP recorded. Patient is postmenopausal.  Allergies reviewed: Yes  Medications reviewed: Yes    Medications: Medication refills not needed today.  Pharmacy name entered into [employer]:    [id number] - [place], [place] - [address] [facility] & [address] [place] [address] PHARMACY [facility] DELIV[facility] - [place] [address] [address]    Clinical concerns: No new concerns. Provider was notified.    10 minutes for nursing intake (face to face time)     Roxi Curry LPN            "

## 2019-01-23 NOTE — PATIENT INSTRUCTIONS
[facility] Main Line: [phone]    Call triage nurse with chills and/or temperature greater than or equal to 100.4, uncontrolled nausea/vomiting, diarrhea, constipation, dizziness, shortness of breath, chest pain, bleeding, unexplained bruising, or any new/concerning symptoms, questions/concerns.     If you are having any concerning symptoms or wish to speak to a provider before your next infusion visit, please call your care coordinator or triage to notify them so we can adequately serve you.     For triage nurse, after hours, weekends, and holidays: [phone]

## 2019-01-23 NOTE — LETTER
1/23/2019       RE: Dimple Oviedo  [address]  [facility] [address]     Dear Colleague,    Thank you for referring your patient, Dimple Oviedo, to the [facility]. Please see a copy of my visit note below.    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    HISTORY OF PRESENT ILLNESS:  Ms. 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 Family accompanies her for the visit.    Ms. Oviedo had done okay so far with the 2 cycles of chemotherapy.  She has had increasing fatigue and hair loss, but performance status has now improved to ECOG 1.  Cough is chronic and stable.  Nausea is better with premedications given with cycle 2.  She wants to continue therapy today. No signs/symptoms of locoregional or distant metastatic disease, or acute or chronic illnesses that may preclude chemotherapy delivery today.    ONCOLOGIC HISTORY:  1. 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   Jan 2018 to September 2018- Persistent gross hematuria and underwent multiple procedures.    2/19/18 and 4/3/18- cystoscopies with bladder biopsies  which were negative for bladder tumor  1/17/18, 3/8/18, 7/26/18, 9/10/18- Multiple urine cytologies have come back positive by FISH for high-grade transitional cell carcinoma  9/10/18- Underwent a bilateral cystoureteroscopy with biopsy and brushing that showed  right upper tract cytology suspicious for high-grade urothelial ca. Right ureter brushing negative from that day. Left upper tract negative.  9/10/18- CT A/P without contrast showed new small bilateral pleural effusions and  "interstitial pulmonary edema but no evidence of locoregional or metastatic disease.  9/12/18- Presented to ED with oliguria, CRESENCIO, and mild hydronephrosis bilaterally on CT scan and underwent bilateral ureteral stent placment  11/13/2018- Right robotic nephroureterectomy, excision of ana-caval mass and LN excision by Stuart King. Pathology showed \"Right nephroureterectomy: Invasive high grade urothelial carcinoma measuring 3.5 cm, located in renal pelvis and invading through renal parenchyma into perinephric fat. Urothelial carcinoma in-situ present. Margins free of tumor. Ana-caval mass: Metastatic urothelial carcinoma involving one lymph node with at least 1 cm tumor deposit. Pericaval Extranodal Extension present. Five additional benign pericaval lymph nodes. Pathologic stage: pT4N1 (1 of 6 lymph nodes).\"  12/11/18- PET/CT whole body demonstrated significant residual FDG avid disease. For example, \"there is an FDG avid ill-defined pericaval mass immediately medial to the surgical clips measuring approximately 2.0 x 1.4 cm, with max SUV measuring up to12.2, see series 4 image 21. Additional FDG avid retrocaval and interaortocaval lymphadenopathy are noted.There is a 1.2 cm nodule in the right hemipelvis posterior lateral tothe bladder with max SUV measuring 8.3, see series 4 image 77. The bladder is incompletely distended.\" No suspicious thoracic or bone uptake.  12/12/18- Started adjuvant chemotherapy (carbo+gem) per POUT trial. Cycle 2 - 1/2/19. Cycle 3 - 1/23/19.    REVIEW OF SYSTEMS: 14 point ROS negative other than the symptoms noted above in the HPI.    PAST MEDICAL AND SURGICAL HISTORY:   Active Ambulatory Problems     Diagnosis Date Noted     Esophageal reflux 09/22/2004     Restless leg syndrome 10/12/2005     heat intolerance 10/12/2005     Goiter 10/12/2005     Disorder of bone and cartilage 10/12/2005     Other psoriasis 10/12/2005     perirectal cyst 12/16/2005     Malignant melanoma of skin " of trunk, except scrotum (H)      Nontoxic multinodular goiter      Hyperlipidemia LDL goal <130 05/09/2010     Hypertension goal BP (blood pressure) < 140/90 11/30/2011     Urinary incontinence 11/30/2011     Osteoarthritis of left knee 11/30/2011     Hip arthritis 06/23/2014     Polymyalgia rheumatica (H) 07/10/2014     High risk medication use 10/15/2014     Shoulder pain 10/31/2014     Impaired fasting blood sugar 11/24/2015     Chronic bilateral low back pain without sciatica 12/05/2016     Obesity, unspecified obesity severity, unspecified obesity type 02/22/2017     Iron deficiency anemia, unspecified iron deficiency anemia type 11/30/2017     NSTEMI (non-ST elevated myocardial infarction) (H) 12/13/2017     Stress-induced cardiomyopathy 12/14/2017     A-fib (H) 01/16/2018     Anxiety 02/07/2018     Chronic systolic heart failure (H) 02/20/2018     Urothelial carcinoma (H) 03/22/2018     Hyperthyroidism 06/28/2018     Restless legs syndrome 07/11/2018     CRESENCIO (acute kidney injury) (H) 09/11/2018     Hydronephrosis 09/11/2018     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     Graves disease 12/04/2018     Encounter for antineoplastic chemotherapy 01/09/2019     Resolved Ambulatory Problems     Diagnosis Date Noted     Undiagnosed cardiac murmurs      Knee pain 01/09/2013     Disorder of bursae and tendons in shoulder region 09/30/2014     Sepsis (H) 10/30/2015     Shoulder joint pain, unspecified laterality 03/30/2016     Injury of right rotator cuff 03/30/2016     Atrial fibrillation, unspecified type (H) 01/25/2018     (HFpEF) heart failure with preserved ejection fraction (H) 07/06/2018     Past Medical History:   Diagnosis Date     Calculus of kidney      Esophageal reflux      GERD (gastroesophageal reflux disease)      Hyperlipidemia LDL goal <130 5/9/2010     Malignant melanoma of skin of trunk, except scrotum (H)      Nonspecific abnormal finding      Nontoxic multinodular goiter       "Osteopenia      Other psoriasis      Personal history of colonic polyps      PMR (polymyalgia rheumatica) (H)      Stress-induced cardiomyopathy      Undiagnosed cardiac murmurs      Unspecified constipation      Unspecified essential hypertension      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     SOCIAL HISTORY:   Social History     Tobacco Use     Smoking status: Never Smoker     Smokeless tobacco: Never Used   Substance Use Topics     Alcohol use: No     Alcohol/week: 0.0 oz     Drug use: No     FAMILY HISTORY:   Family History   Problem Relation Age of Onset     Cancer Father         dec - esophageal and laryngeal     Heart Disease Mother      Respiratory Mother         dec     Breast Cancer Daughter      Other Cancer Daughter      Thyroid Disease Daughter      Asthma Daughter      Hyperlipidemia Son      Diabetes Son      ALLERGIES:   Allergies   Allergen Reactions     Codeine      CURRENT MEDICATIONS:   Current Outpatient Medications:      acetaminophen (TYLENOL) 650 MG CR tablet, Take 1 tablet (650 mg) by mouth every 8 hours as needed for pain, Disp: 100 tablet, Rfl: 0     alendronate (FOSAMAX) 70 MG tablet, TAKE 1 TABLET EVERY 7 DAYS AT LEAST 60 MIN BEFORE BREAKFAST AS DIRECTED \"SEE PACKAGE FOR ADDITIONAL INSTRUCTIONS\", Disp: 12 tablet, Rfl: 1     aspirin 81 MG tablet, Take 81 mg by mouth every evening , Disp: , Rfl:      Calcium Citrate-Vitamin D (CALCIUM + D PO), Take 1 tablet by mouth 2 times daily , Disp: , Rfl:      cycloSPORINE (RESTASIS) 0.05 % ophthalmic emulsion, Place 1 drop into both eyes 2 times daily, Disp: , Rfl:      ferrous sulfate 325 (65 Fe) MG TBEC EC tablet, Take 325 mg by mouth daily, Disp: , Rfl:      furosemide (LASIX) 20 MG tablet, Take 1 tablet (20 mg) by mouth every morning, Disp: 90 tablet, Rfl: 1     irbesartan (AVAPRO) 300 MG tablet, TAKE 1 TABLET EVERY DAY (Patient taking differently: Take 300 mg by mouth every morning ), Disp: 90 tablet, Rfl: 2     LORazepam (ATIVAN) 0.5 MG tablet, " Take 1 tablet (0.5 mg) by mouth every 6 hours as needed for anxiety, nausea or sleep, Disp: 30 tablet, Rfl: 0     lovastatin (MEVACOR) 40 MG tablet, TAKE 1 TABLET AT BEDTIME (HYPERLIPIDEMIA LDL GOAL BELOW 130), Disp: 90 tablet, Rfl: 2     methimazole (TAPAZOLE) 5 MG tablet, 10 mg alternating with 15 mg every other day, Disp: 225 tablet, Rfl: 3     nitroGLYcerin (NITROSTAT) 0.4 MG sublingual tablet, For chest pain place 1 tablet under the tongue every 5 minutes for 3 doses. If symptoms persist 5 minutes after 1st dose call 911., Disp: 25 tablet, Rfl: 3     Omega-3 Fatty Acids (FISH OIL) 500 MG CAPS, Take 1 capsule by mouth 2 times daily , Disp: , Rfl:      omeprazole (PRILOSEC) 20 MG CR capsule, Take 1 capsule (20 mg) by mouth daily (Patient taking differently: Take 20 mg by mouth every morning ), Disp: 180 capsule, Rfl: 3     ondansetron (ZOFRAN) 8 MG tablet, Take 1 tablet (8 mg) by mouth every 8 hours as needed (Nausea/Vomiting), Disp: 30 tablet, Rfl: 5     order for DME, Equipment being ordered: Knee high compression for B LE 20-30mmHg, Velcro units for night time (consider hybrid sock as foot swelling is less than leg swelling), Donning device, Disp: 1 each, Rfl: 2     polyethylene glycol (MIRALAX/GLYCOLAX) packet, Take 17 g by mouth, Disp: , Rfl:      Probiotic Product (PROBIOTIC ADVANCED PO), Take 1 capsule by mouth every morning , Disp: , Rfl:      prochlorperazine (COMPAZINE) 10 MG tablet, Take 0.5 tablets (5 mg) by mouth every 6 hours as needed (Nausea/Vomiting - take if Zofran doesn't work after 30 mins.), Disp: 30 tablet, Rfl: 5     propranolol ER (INDERAL LA) 60 MG 24 hr capsule, TAKE 1 CAPSULE EVERY DAY, Disp: 90 capsule, Rfl: 1     rOPINIRole (REQUIP) 0.25 MG tablet, 0.25 mg in the afternoon and 0.5 mg at night, Disp: 180 tablet, Rfl: 1     senna-docusate (SENOKOT-S;PERICOLACE) 8.6-50 MG per tablet, Take 1 tablet by mouth 2 times daily To prevent constipation, Disp: 60 tablet, Rfl: 0     sertraline  "(ZOLOFT) 100 MG tablet, Take 1 tablet (100 mg) by mouth every morning, Disp: 90 tablet, Rfl: 1     traZODone (DESYREL) 50 MG tablet, TAKE 1/2 TABLET(25 MG)  AT BEDTIME, Disp: 45 tablet, Rfl: 1     lidocaine-prilocaine (EMLA) 2.5-2.5 % external cream, Apply topically as needed for moderate pain (Patient not taking: Reported on 1/23/2019), Disp: 30 g, Rfl: 0    Current Facility-Administered Medications:      heparin 100 UNIT/ML injection 5 mL, 5 mL, Intracatheter, Q8H PRN, Jaden Toledo MD, 5 mL at 01/23/19 0910     sodium chloride (PF) 0.9% PF flush 20 mL, 20 mL, Intracatheter, Q1H PRN, Jaden Toledo MD, 20 mL at 01/23/19 0910    PHYSICAL EXAMINATION:  Vital signs: /56 (BP Location: Right arm, Patient Position: Sitting, Cuff Size: Adult Regular)   Pulse 69   Temp 98.2  F (36.8  C) (Oral)   Resp 18   Ht 1.444 m (4' 8.85\")   Wt 65 kg (143 lb 3.2 oz)   SpO2 96%   BMI 31.15 kg/m     ECOG performance status of 1. Living independently at home.  GENERAL: Well-nourished healthy-appearing sitting in chair, no acute distress.   HEENT: No icterus, no pallor. Moist mucous membranes. Oropharynx is clear.   NECK: Supple, no JVD/LAD.  LUNGS: Clear to ausculation bilaterally, normal work of breathing.   CARDIOVASCULAR: Regular rate and rhythm, no murmurs, gallops or rubs.   ABDOMEN: Soft, nontender and nondistended, no palpable masses, bowel sounds present.  EXTREMITIES: No cyanosis, no clubbing, b/l LE edema improved.  NEUROLOGIC: No focal deficits, CN 2-12 intact.  LYMPH NODE EXAM: No palpable adenopathy - cervical, axillary or inguinal.     LABORATORY DATA:  Recent Labs   Lab Test 01/23/19  0912 01/16/19  1033 01/09/19  1214 01/02/19  0725 12/26/18  1253   WBC 7.9 15.1* 2.7* 3.8* 2.8*   RBC 3.09* 3.21* 3.19* 3.26* 3.20*   HGB 8.6* 8.7* 8.7* 9.0* 8.6*   HCT 27.9* 28.1* 27.3* 28.3* 27.5*   MCV 90 88 86 87 86   MCH 27.8 27.1 27.3 27.6 26.9   MCHC 30.8* 31.0* 31.9 31.8 31.3*   RDW 19.5* 15.4* 14.4 15.1* 14.3   * 82* " "372 271 53*   NEUTROPHIL 77.3 86.7 44.9 54.3 73.1     --   --  134 133   POTASSIUM 4.3  --   --  4.1 4.0   CHLORIDE 102  --   --  101 100   CO2 27  --   --  26 25   ANIONGAP 7  --   --  7 9   *  --   --  130* 117*   BUN 21  --   --  28 26   CR 0.97  --   --  0.97 1.06*   SHREYA 8.6  --   --  8.8 8.3*   PROTTOTAL 7.0  --   --  7.3 7.1   ALBUMIN 2.9*  --   --  2.7* 2.7*   BILITOTAL 0.3  --   --  0.3 0.4   ALKPHOS 109  --   --  114 100   AST 18  --   --  15 21   ALT 18  --   --  20 26     IMAGING STUDIES:  1. CT C/A/P with contrast 1/22/19 compared with prior PET/CT: \"1. New well-circumscribed soft tissue pulmonary nodules of the right lower lobe and left upper lobe. Findings could be infectious, inflammatory, or represent metastatic disease. Continued attention on follow-up recommended. Postoperative  changes of right nephrectomy. No evidence for local recurrence in the present study. 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  3. Nonobstructing 3 mm stone of the left kidney.  4. Persistent common bile duct ectasia, likely physiologic in the setting of direct cholecystectomy. 5. Multinodular goiter. \"  2. PET/CT 12/11/18: \"There is a seroma in the surgical fossa. FDG avid disease in the retroperitoneum adjacent to surgical clips and retrocaval andinteraortocaval lymphadenopathy. FDG avid focus right posterolateral to urinary bladder might represent another node.\"    ASSESSMENT AND PLAN: Ms. 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     1. Upper tract urothelial cancer:  - I discussed the CT scan report with patient and family.  He does not show any evidence of local regional disease as was concerning on the prior PET/CT scan.  This may suggest that the PET/CT findings were likely reactive but will need to be " closely followed.  Additionally the pulmonary nodules noted on today's CT scan do look suspicious but are quite small.   - I discussed that a biopsy at this time is unlikely to be high yield and could be associated with high periprocedural complications because of the small lesion size.  Patient in agreement with continued monitoring with CT scans.  - Continue carboplatin 5AUC IV on day 1 with gemcitabine 1000mg/m2 IV on days 1 and 8 of each 3 week cycle for 4-6 cycles. Restaging scan (CT without contrast to protect renal function) after every 2 cycles. Aware of side effects of carboplatin and gemcitabine chemotherapy at length including fatigue, nausea, vomiting, diarrhea, myelosuppression, nephropathy, neuropathy, and other side effects including second malignacies etc.    - Continue pre-meds on day 1 and Neulasta on-pro on day 8.    2. Renal artery saccular aneursym:  - Vascular surgery referral placed. Expected that they will be managing this issue completely.  Return to see me in 3 weeks.   All of the above was explained to the patient and family in lay language and several questions were answered. They are in agreement with the plan.  BILLIN.  Jaden Toledo M.D.  . Professor of Medicine  Genitourinary Oncology  Division of Hematology, Oncology & Transplantation  [facility]

## 2019-01-24 NOTE — TELEPHONE ENCOUNTER
"Requested Prescriptions   Pending Prescriptions Disp Refills     irbesartan (AVAPRO) 300 MG tablet [Pharmacy Med Name: IRBESARTAN 300 MG Tablet]  Last Written Prescription Date:  3/9/2018  Last Fill Quantity: 90 tablet,  # refills: 2   Last Office Visit: 1/16/2019   Future Office Visit:      90 tablet 2     Sig: TAKE 1 TABLET EVERY DAY    Angiotensin-II Receptors Passed - 1/23/2019  5:19 PM       Passed - Blood pressure under 140/90 in past 12 months    BP Readings from Last 3 Encounters:   01/23/19 121/56   01/16/19 124/56   01/09/19 143/58          Passed - Recent (12 mo) or future (30 days) visit within the authorizing provider's specialty    Patient had office visit in the last 12 months or has a visit in the next 30 days with authorizing provider or within the authorizing provider's specialty.  See \"Patient Info\" tab in inbasket, or \"Choose Columns\" in Meds & Orders section of the refill encounter.           Passed - Medication is active on med list       Passed - Patient is age 18 or older       Passed - No active pregnancy on record       Passed - Normal serum creatinine on file in past 12 months    Recent Labs   Lab Test 01/23/19 0912   CR 0.97          Passed - Normal serum potassium on file in past 12 months    Recent Labs   Lab Test 01/23/19 0912   POTASSIUM 4.3           Passed - No positive pregnancy test in past 12 months          "

## 2019-01-24 NOTE — PROGRESS NOTES
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    HISTORY OF PRESENT ILLNESS:  Ms. 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 Family accompanies her for the visit.    Ms. Oviedo had done okay so far with the 2 cycles of chemotherapy.  She has had increasing fatigue and hair loss, but performance status has now improved to ECOG 1.  Cough is chronic and stable.  Nausea is better with premedications given with cycle 2.  She wants to continue therapy today. No signs/symptoms of locoregional or distant metastatic disease, or acute or chronic illnesses that may preclude chemotherapy delivery today.    ONCOLOGIC HISTORY:  1. 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   Jan 2018 to September 2018- Persistent gross hematuria and underwent multiple procedures.    2/19/18 and 4/3/18- cystoscopies with bladder biopsies  which were negative for bladder tumor  1/17/18, 3/8/18, 7/26/18, 9/10/18- Multiple urine cytologies have come back positive by FISH for high-grade transitional cell carcinoma  9/10/18- Underwent a bilateral cystoureteroscopy with biopsy and brushing that showed  right upper tract cytology suspicious for high-grade urothelial ca. Right ureter brushing negative from that day. Left upper tract negative.  9/10/18- CT A/P without contrast showed new small bilateral pleural effusions and interstitial pulmonary edema but no evidence of locoregional or metastatic disease.  9/12/18- Presented to ED with oliguria, CRESENCIO, and mild hydronephrosis bilaterally on CT scan and underwent bilateral ureteral stent placment  11/13/2018- Right robotic  "nephroureterectomy, excision of sandip-caval mass and LN excision by Stuart King. Pathology showed \"Right nephroureterectomy: Invasive high grade urothelial carcinoma measuring 3.5 cm, located in renal pelvis and invading through renal parenchyma into perinephric fat. Urothelial carcinoma in-situ present. Margins free of tumor. Sandip-caval mass: Metastatic urothelial carcinoma involving one lymph node with at least 1 cm tumor deposit. Pericaval Extranodal Extension present. Five additional benign pericaval lymph nodes. Pathologic stage: pT4N1 (1 of 6 lymph nodes).\"  12/11/18- PET/CT whole body demonstrated significant residual FDG avid disease. For example, \"there is an FDG avid ill-defined pericaval mass immediately medial to the surgical clips measuring approximately 2.0 x 1.4 cm, with max SUV measuring up to12.2, see series 4 image 21. Additional FDG avid retrocaval and interaortocaval lymphadenopathy are noted.There is a 1.2 cm nodule in the right hemipelvis posterior lateral tothe bladder with max SUV measuring 8.3, see series 4 image 77. The bladder is incompletely distended.\" No suspicious thoracic or bone uptake.  12/12/18- Started adjuvant chemotherapy (carbo+gem) per POUT trial. Cycle 2 - 1/2/19. Cycle 3 - 1/23/19.    REVIEW OF SYSTEMS: 14 point ROS negative other than the symptoms noted above in the HPI.    PAST MEDICAL AND SURGICAL HISTORY:   Active Ambulatory Problems     Diagnosis Date Noted     Esophageal reflux 09/22/2004     Restless leg syndrome 10/12/2005     heat intolerance 10/12/2005     Goiter 10/12/2005     Disorder of bone and cartilage 10/12/2005     Other psoriasis 10/12/2005     perirectal cyst 12/16/2005     Malignant melanoma of skin of trunk, except scrotum (H)      Nontoxic multinodular goiter      Hyperlipidemia LDL goal <130 05/09/2010     Hypertension goal BP (blood pressure) < 140/90 11/30/2011     Urinary incontinence 11/30/2011     Osteoarthritis of left knee 11/30/2011     " Hip arthritis 06/23/2014     Polymyalgia rheumatica (H) 07/10/2014     High risk medication use 10/15/2014     Shoulder pain 10/31/2014     Impaired fasting blood sugar 11/24/2015     Chronic bilateral low back pain without sciatica 12/05/2016     Obesity, unspecified obesity severity, unspecified obesity type 02/22/2017     Iron deficiency anemia, unspecified iron deficiency anemia type 11/30/2017     NSTEMI (non-ST elevated myocardial infarction) (H) 12/13/2017     Stress-induced cardiomyopathy 12/14/2017     A-fib (H) 01/16/2018     Anxiety 02/07/2018     Chronic systolic heart failure (H) 02/20/2018     Urothelial carcinoma (H) 03/22/2018     Hyperthyroidism 06/28/2018     Restless legs syndrome 07/11/2018     CRESENCIO (acute kidney injury) (H) 09/11/2018     Hydronephrosis 09/11/2018     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     Graves disease 12/04/2018     Encounter for antineoplastic chemotherapy 01/09/2019     Resolved Ambulatory Problems     Diagnosis Date Noted     Undiagnosed cardiac murmurs      Knee pain 01/09/2013     Disorder of bursae and tendons in shoulder region 09/30/2014     Sepsis (H) 10/30/2015     Shoulder joint pain, unspecified laterality 03/30/2016     Injury of right rotator cuff 03/30/2016     Atrial fibrillation, unspecified type (H) 01/25/2018     (HFpEF) heart failure with preserved ejection fraction (H) 07/06/2018     Past Medical History:   Diagnosis Date     Calculus of kidney      Esophageal reflux      GERD (gastroesophageal reflux disease)      Hyperlipidemia LDL goal <130 5/9/2010     Malignant melanoma of skin of trunk, except scrotum (H)      Nonspecific abnormal finding      Nontoxic multinodular goiter      Osteopenia      Other psoriasis      Personal history of colonic polyps      PMR (polymyalgia rheumatica) (H)      Stress-induced cardiomyopathy      Undiagnosed cardiac murmurs      Unspecified constipation      Unspecified essential hypertension       "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     SOCIAL HISTORY:   Social History     Tobacco Use     Smoking status: Never Smoker     Smokeless tobacco: Never Used   Substance Use Topics     Alcohol use: No     Alcohol/week: 0.0 oz     Drug use: No     FAMILY HISTORY:   Family History   Problem Relation Age of Onset     Cancer Father         dec - esophageal and laryngeal     Heart Disease Mother      Respiratory Mother         dec     Breast Cancer Daughter      Other Cancer Daughter      Thyroid Disease Daughter      Asthma Daughter      Hyperlipidemia Son      Diabetes Son      ALLERGIES:   Allergies   Allergen Reactions     Codeine      CURRENT MEDICATIONS:   Current Outpatient Medications:      acetaminophen (TYLENOL) 650 MG CR tablet, Take 1 tablet (650 mg) by mouth every 8 hours as needed for pain, Disp: 100 tablet, Rfl: 0     alendronate (FOSAMAX) 70 MG tablet, TAKE 1 TABLET EVERY 7 DAYS AT LEAST 60 MIN BEFORE BREAKFAST AS DIRECTED \"SEE PACKAGE FOR ADDITIONAL INSTRUCTIONS\", Disp: 12 tablet, Rfl: 1     aspirin 81 MG tablet, Take 81 mg by mouth every evening , Disp: , Rfl:      Calcium Citrate-Vitamin D (CALCIUM + D PO), Take 1 tablet by mouth 2 times daily , Disp: , Rfl:      cycloSPORINE (RESTASIS) 0.05 % ophthalmic emulsion, Place 1 drop into both eyes 2 times daily, Disp: , Rfl:      ferrous sulfate 325 (65 Fe) MG TBEC EC tablet, Take 325 mg by mouth daily, Disp: , Rfl:      furosemide (LASIX) 20 MG tablet, Take 1 tablet (20 mg) by mouth every morning, Disp: 90 tablet, Rfl: 1     irbesartan (AVAPRO) 300 MG tablet, TAKE 1 TABLET EVERY DAY (Patient taking differently: Take 300 mg by mouth every morning ), Disp: 90 tablet, Rfl: 2     LORazepam (ATIVAN) 0.5 MG tablet, Take 1 tablet (0.5 mg) by mouth every 6 hours as needed for anxiety, nausea or sleep, Disp: 30 tablet, Rfl: 0     lovastatin (MEVACOR) 40 MG tablet, TAKE 1 TABLET AT BEDTIME (HYPERLIPIDEMIA LDL GOAL BELOW 130), Disp: 90 tablet, Rfl: 2     methimazole " (TAPAZOLE) 5 MG tablet, 10 mg alternating with 15 mg every other day, Disp: 225 tablet, Rfl: 3     nitroGLYcerin (NITROSTAT) 0.4 MG sublingual tablet, For chest pain place 1 tablet under the tongue every 5 minutes for 3 doses. If symptoms persist 5 minutes after 1st dose call 911., Disp: 25 tablet, Rfl: 3     Omega-3 Fatty Acids (FISH OIL) 500 MG CAPS, Take 1 capsule by mouth 2 times daily , Disp: , Rfl:      omeprazole (PRILOSEC) 20 MG CR capsule, Take 1 capsule (20 mg) by mouth daily (Patient taking differently: Take 20 mg by mouth every morning ), Disp: 180 capsule, Rfl: 3     ondansetron (ZOFRAN) 8 MG tablet, Take 1 tablet (8 mg) by mouth every 8 hours as needed (Nausea/Vomiting), Disp: 30 tablet, Rfl: 5     order for DME, Equipment being ordered: Knee high compression for B LE 20-30mmHg, Velcro units for night time (consider hybrid sock as foot swelling is less than leg swelling), Donning device, Disp: 1 each, Rfl: 2     polyethylene glycol (MIRALAX/GLYCOLAX) packet, Take 17 g by mouth, Disp: , Rfl:      Probiotic Product (PROBIOTIC ADVANCED PO), Take 1 capsule by mouth every morning , Disp: , Rfl:      prochlorperazine (COMPAZINE) 10 MG tablet, Take 0.5 tablets (5 mg) by mouth every 6 hours as needed (Nausea/Vomiting - take if Zofran doesn't work after 30 mins.), Disp: 30 tablet, Rfl: 5     propranolol ER (INDERAL LA) 60 MG 24 hr capsule, TAKE 1 CAPSULE EVERY DAY, Disp: 90 capsule, Rfl: 1     rOPINIRole (REQUIP) 0.25 MG tablet, 0.25 mg in the afternoon and 0.5 mg at night, Disp: 180 tablet, Rfl: 1     senna-docusate (SENOKOT-S;PERICOLACE) 8.6-50 MG per tablet, Take 1 tablet by mouth 2 times daily To prevent constipation, Disp: 60 tablet, Rfl: 0     sertraline (ZOLOFT) 100 MG tablet, Take 1 tablet (100 mg) by mouth every morning, Disp: 90 tablet, Rfl: 1     traZODone (DESYREL) 50 MG tablet, TAKE 1/2 TABLET(25 MG)  AT BEDTIME, Disp: 45 tablet, Rfl: 1     lidocaine-prilocaine (EMLA) 2.5-2.5 % external cream,  "Apply topically as needed for moderate pain (Patient not taking: Reported on 1/23/2019), Disp: 30 g, Rfl: 0    Current Facility-Administered Medications:      heparin 100 UNIT/ML injection 5 mL, 5 mL, Intracatheter, Q8H PRN, Jaden Toledo MD, 5 mL at 01/23/19 0910     sodium chloride (PF) 0.9% PF flush 20 mL, 20 mL, Intracatheter, Q1H PRN, Jaden Toledo MD, 20 mL at 01/23/19 0910    PHYSICAL EXAMINATION:  Vital signs: /56 (BP Location: Right arm, Patient Position: Sitting, Cuff Size: Adult Regular)   Pulse 69   Temp 98.2  F (36.8  C) (Oral)   Resp 18   Ht 1.444 m (4' 8.85\")   Wt 65 kg (143 lb 3.2 oz)   SpO2 96%   BMI 31.15 kg/m     ECOG performance status of 1. Living independently at home.  GENERAL: Well-nourished healthy-appearing sitting in chair, no acute distress.   HEENT: No icterus, no pallor. Moist mucous membranes. Oropharynx is clear.   NECK: Supple, no JVD/LAD.  LUNGS: Clear to ausculation bilaterally, normal work of breathing.   CARDIOVASCULAR: Regular rate and rhythm, no murmurs, gallops or rubs.   ABDOMEN: Soft, nontender and nondistended, no palpable masses, bowel sounds present.  EXTREMITIES: No cyanosis, no clubbing, b/l LE edema improved.  NEUROLOGIC: No focal deficits, CN 2-12 intact.  LYMPH NODE EXAM: No palpable adenopathy - cervical, axillary or inguinal.     LABORATORY DATA:  Recent Labs   Lab Test 01/23/19  0912 01/16/19  1033 01/09/19  1214 01/02/19  0725 12/26/18  1253   WBC 7.9 15.1* 2.7* 3.8* 2.8*   RBC 3.09* 3.21* 3.19* 3.26* 3.20*   HGB 8.6* 8.7* 8.7* 9.0* 8.6*   HCT 27.9* 28.1* 27.3* 28.3* 27.5*   MCV 90 88 86 87 86   MCH 27.8 27.1 27.3 27.6 26.9   MCHC 30.8* 31.0* 31.9 31.8 31.3*   RDW 19.5* 15.4* 14.4 15.1* 14.3   * 82* 372 271 53*   NEUTROPHIL 77.3 86.7 44.9 54.3 73.1     --   --  134 133   POTASSIUM 4.3  --   --  4.1 4.0   CHLORIDE 102  --   --  101 100   CO2 27  --   --  26 25   ANIONGAP 7  --   --  7 9   *  --   --  130* 117*   BUN 21  --   --  28 26 " "  CR 0.97  --   --  0.97 1.06*   SHREYA 8.6  --   --  8.8 8.3*   PROTTOTAL 7.0  --   --  7.3 7.1   ALBUMIN 2.9*  --   --  2.7* 2.7*   BILITOTAL 0.3  --   --  0.3 0.4   ALKPHOS 109  --   --  114 100   AST 18  --   --  15 21   ALT 18  --   --  20 26     IMAGING STUDIES:  1. CT C/A/P with contrast 1/22/19 compared with prior PET/CT: \"1. New well-circumscribed soft tissue pulmonary nodules of the right lower lobe and left upper lobe. Findings could be infectious, inflammatory, or represent metastatic disease. Continued attention on follow-up recommended. Postoperative  changes of right nephrectomy. No evidence for local recurrence in the present study. 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  3. Nonobstructing 3 mm stone of the left kidney.  4. Persistent common bile duct ectasia, likely physiologic in the setting of direct cholecystectomy. 5. Multinodular goiter. \"  2. PET/CT 12/11/18: \"There is a seroma in the surgical fossa. FDG avid disease in the retroperitoneum adjacent to surgical clips and retrocaval andinteraortocaval lymphadenopathy. FDG avid focus right posterolateral to urinary bladder might represent another node.\"    ASSESSMENT AND PLAN: Ms. 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     1. Upper tract urothelial cancer:  - I discussed the CT scan report with patient and family.  He does not show any evidence of local regional disease as was concerning on the prior PET/CT scan.  This may suggest that the PET/CT findings were likely reactive but will need to be closely followed.  Additionally the pulmonary nodules noted on today's CT scan do look suspicious but are quite small.   - I discussed that a biopsy at this time is unlikely to be high yield and could be associated with high periprocedural complications " because of the small lesion size.  Patient in agreement with continued monitoring with CT scans.  - Continue carboplatin 5AUC IV on day 1 with gemcitabine 1000mg/m2 IV on days 1 and 8 of each 3 week cycle for 4-6 cycles. Restaging scan (CT without contrast to protect renal function) after every 2 cycles. Aware of side effects of carboplatin and gemcitabine chemotherapy at length including fatigue, nausea, vomiting, diarrhea, myelosuppression, nephropathy, neuropathy, and other side effects including second malignacies etc.    - Continue pre-meds on day 1 and Neulasta on-pro on day 8.    2. Renal artery saccular aneursym:  - Vascular surgery referral placed. Expected that they will be managing this issue completely.  Return to see me in 3 weeks.   All of the above was explained to the patient and family in lay language and several questions were answered. They are in agreement with the plan.  BILLIN.  Jaden Toledo M.D.  . Professor of Medicine  Genitourinary Oncology  Division of Hematology, Oncology & Transplantation  [facility]

## 2019-01-26 RX ORDER — IRBESARTAN 300 MG/1
TABLET ORAL
Qty: 90 TABLET | Refills: 3 | Status: SHIPPED | OUTPATIENT
Start: 2019-01-26 | End: 2019-10-04

## 2019-01-26 NOTE — TELEPHONE ENCOUNTER
Prescription approved per [facility] Refill Protocol.    Signed Prescriptions:                        Disp   Refills    irbesartan (AVAPRO) 300 MG tablet          90 tab*3        Sig: TAKE 1 TABLET EVERY DAY  Authorizing Provider: BLAYNE MORENO  Ordering User: RADHA BUSBY      Closing encounter - no further actions needed at this time    Radha Busby RN

## 2019-01-28 DIAGNOSIS — G47.00 INSOMNIA, UNSPECIFIED TYPE: ICD-10-CM

## 2019-01-28 DIAGNOSIS — K21.9 GASTROESOPHAGEAL REFLUX DISEASE WITHOUT ESOPHAGITIS: ICD-10-CM

## 2019-01-28 NOTE — TELEPHONE ENCOUNTER
"Requested Prescriptions   Pending Prescriptions Disp Refills     traZODone (DESYREL) 50 MG tablet  Last Written Prescription Date:  9/19/2018  Last Fill Quantity: 45 tablet,  # refills: 1   Last Office Visit: 1/16/2019   Future Office Visit:      45 tablet 1     Sig: TAKE 1/2 TABLET(25 MG)  AT BEDTIME    Serotonin Modulators Passed - 1/28/2019  4:21 PM       Passed - Recent (12 mo) or future (30 days) visit within the authorizing provider's specialty    Patient had office visit in the last 12 months or has a visit in the next 30 days with authorizing provider or within the authorizing provider's specialty.  See \"Patient Info\" tab in inbasket, or \"Choose Columns\" in Meds & Orders section of the refill encounter.           Passed - Medication is active on med list       Passed - Patient is age 18 or older       Passed - No active pregnancy on record       Passed - No positive pregnancy test in past 12 months     _________________________________________________________________________________________________________________________       omeprazole (PRILOSEC) 20 MG DR capsule  Last Written Prescription Date:  10/30/2017  Last Fill Quantity: 180 capsule,  # refills: 3   Last Office Visit: 1/16/2019   Future Office Visit:      180 capsule 3     Sig: Take 1 capsule (20 mg) by mouth daily    PPI Protocol Passed - 1/28/2019  4:21 PM       Passed - Not on Clopidogrel (unless Pantoprazole ordered)       Passed - No diagnosis of osteoporosis on record       Passed - Recent (12 mo) or future (30 days) visit within the authorizing provider's specialty    Patient had office visit in the last 12 months or has a visit in the next 30 days with authorizing provider or within the authorizing provider's specialty.  See \"Patient Info\" tab in inbasket, or \"Choose Columns\" in Meds & Orders section of the refill encounter.           Passed - Medication is active on med list       Passed - Patient is age 18 or older       Passed - No " active pregnacy on record       Passed - No positive pregnancy test in past 12 months

## 2019-01-28 NOTE — TELEPHONE ENCOUNTER
Reason for Call:  Medication or medication refill:    Do you use a [facility]?  Name of the pharmacy and phone number for the current request:  [facility] - [place], [place] - [address]     Name of the medication requested:   1. traZODone (DESYREL) 50 MG tablet  2. omeprazole (PRILOSEC) 20 MG CR capsule    Other request: Patient is requesting for refills. Please advise, thank you!    *Also states trazdone should be 90 tablets instead of 45.*    Can we leave a detailed message on this number? YES    Phone number patient can be reached at: Home number on file [phone] (home)    Best Time: anytime    Call taken on 1/28/2019 at 12:38 PM by Domonique Gill

## 2019-01-30 ENCOUNTER — INFUSION THERAPY VISIT (OUTPATIENT)
Dept: ONCOLOGY | Facility: CLINIC | Age: 84
End: 2019-01-30
Attending: INTERNAL MEDICINE
Payer: MEDICARE

## 2019-01-30 ENCOUNTER — APPOINTMENT (OUTPATIENT)
Dept: LAB | Facility: CLINIC | Age: 84
End: 2019-01-30
Attending: INTERNAL MEDICINE
Payer: MEDICARE

## 2019-01-30 VITALS
BODY MASS INDEX: 31.35 KG/M2 | WEIGHT: 144.1 LBS | HEART RATE: 67 BPM | OXYGEN SATURATION: 97 % | DIASTOLIC BLOOD PRESSURE: 53 MMHG | SYSTOLIC BLOOD PRESSURE: 139 MMHG | TEMPERATURE: 97.9 F

## 2019-01-30 DIAGNOSIS — C66.1 UROTHELIAL CARCINOMA OF RIGHT DISTAL URETER (H): Primary | ICD-10-CM

## 2019-01-30 DIAGNOSIS — Z51.11 ENCOUNTER FOR ANTINEOPLASTIC CHEMOTHERAPY: ICD-10-CM

## 2019-01-30 DIAGNOSIS — C68.9 UROTHELIAL CARCINOMA (H): ICD-10-CM

## 2019-01-30 LAB
BASOPHILS # BLD AUTO: 0 10E9/L (ref 0–0.2)
BASOPHILS NFR BLD AUTO: 0.4 %
DIFFERENTIAL METHOD BLD: ABNORMAL
EOSINOPHIL # BLD AUTO: 0 10E9/L (ref 0–0.7)
EOSINOPHIL NFR BLD AUTO: 0.4 %
ERYTHROCYTE [DISTWIDTH] IN BLOOD BY AUTOMATED COUNT: 18.6 % (ref 10–15)
HCT VFR BLD AUTO: 25.7 % (ref 35–47)
HGB BLD-MCNC: 8.2 G/DL (ref 11.7–15.7)
IMM GRANULOCYTES # BLD: 0 10E9/L (ref 0–0.4)
IMM GRANULOCYTES NFR BLD: 0.4 %
LYMPHOCYTES # BLD AUTO: 0.8 10E9/L (ref 0.8–5.3)
LYMPHOCYTES NFR BLD AUTO: 33.3 %
MCH RBC QN AUTO: 28.9 PG (ref 26.5–33)
MCHC RBC AUTO-ENTMCNC: 31.9 G/DL (ref 31.5–36.5)
MCV RBC AUTO: 91 FL (ref 78–100)
MONOCYTES # BLD AUTO: 0.3 10E9/L (ref 0–1.3)
MONOCYTES NFR BLD AUTO: 10.7 %
NEUTROPHILS # BLD AUTO: 1.4 10E9/L (ref 1.6–8.3)
NEUTROPHILS NFR BLD AUTO: 54.8 %
NRBC # BLD AUTO: 0 10*3/UL
NRBC BLD AUTO-RTO: 0 /100
PLATELET # BLD AUTO: 321 10E9/L (ref 150–450)
RBC # BLD AUTO: 2.84 10E12/L (ref 3.8–5.2)
WBC # BLD AUTO: 2.5 10E9/L (ref 4–11)

## 2019-01-30 PROCEDURE — 96413 CHEMO IV INFUSION 1 HR: CPT

## 2019-01-30 PROCEDURE — 96377 APPLICATON ON-BODY INJECTOR: CPT | Mod: 59

## 2019-01-30 PROCEDURE — 25000128 H RX IP 250 OP 636: Mod: ZF | Performed by: INTERNAL MEDICINE

## 2019-01-30 PROCEDURE — 85025 COMPLETE CBC W/AUTO DIFF WBC: CPT | Performed by: INTERNAL MEDICINE

## 2019-01-30 PROCEDURE — 96375 TX/PRO/DX INJ NEW DRUG ADDON: CPT

## 2019-01-30 PROCEDURE — 96367 TX/PROPH/DG ADDL SEQ IV INF: CPT

## 2019-01-30 RX ORDER — TRAZODONE HYDROCHLORIDE 50 MG/1
TABLET, FILM COATED ORAL
Qty: 45 TABLET | Refills: 0 | Status: SHIPPED | OUTPATIENT
Start: 2019-01-30 | End: 2019-06-12

## 2019-01-30 RX ORDER — PALONOSETRON 0.05 MG/ML
0.25 INJECTION, SOLUTION INTRAVENOUS ONCE
Status: COMPLETED | OUTPATIENT
Start: 2019-01-30 | End: 2019-01-30

## 2019-01-30 RX ORDER — HEPARIN SODIUM (PORCINE) LOCK FLUSH IV SOLN 100 UNIT/ML 100 UNIT/ML
5 SOLUTION INTRAVENOUS ONCE
Status: COMPLETED | OUTPATIENT
Start: 2019-01-30 | End: 2019-01-30

## 2019-01-30 RX ADMIN — DEXAMETHASONE SODIUM PHOSPHATE 12 MG: 10 INJECTION, SOLUTION INTRAMUSCULAR; INTRAVENOUS at 15:12

## 2019-01-30 RX ADMIN — PALONOSETRON HYDROCHLORIDE 0.25 MG: 0.25 INJECTION INTRAVENOUS at 15:41

## 2019-01-30 RX ADMIN — HEPARIN 5 ML: 100 SYRINGE at 14:06

## 2019-01-30 RX ADMIN — HEPARIN 5 ML: 100 SYRINGE at 16:20

## 2019-01-30 RX ADMIN — PEGFILGRASTIM 6 MG: KIT SUBCUTANEOUS at 16:14

## 2019-01-30 RX ADMIN — SODIUM CHLORIDE 500 ML: 9 INJECTION, SOLUTION INTRAVENOUS at 15:12

## 2019-01-30 RX ADMIN — GEMCITABINE 1600 MG: 38 INJECTION, SOLUTION INTRAVENOUS at 15:44

## 2019-01-30 ASSESSMENT — PAIN SCALES - GENERAL: PAINLEVEL: NO PAIN (0)

## 2019-01-30 NOTE — NURSING NOTE
Chief Complaint   Patient presents with     Port Draw     labs drawn via port by RN     /53 (BP Location: Left arm, Patient Position: Chair, Cuff Size: Adult Regular)   Pulse 67   Temp 97.9  F (36.6  C) (Oral)   Wt 65.4 kg (144 lb 1.6 oz)   SpO2 97%   BMI 31.35 kg/m      Port accessed by RN in lab. Labs collected and sent. Line flushed with NS & Heparin. Pt tolerated well.   Pt checked in for next appointment.    Gaby Valero, RN

## 2019-01-30 NOTE — PROGRESS NOTES
Infusion Nursing Note:  Dimple Oviedo presents today for Cycle 3 Day 8 Gemzar, On-body Neulasta.    Patient seen by provider today: No   present during visit today: Not Applicable.    Note: Patient's Hgb 8.2. Patient denies SOB, dizziness when standing and abnormal fatigue. Patient is off of treatment next week. Writer discussed that patient could come in for a lab draw and possible transfusion next week, or just a lab draw, or wait and see if she becomes symptomatic. Patient decided to wait and see. She was instructed to call the triage line if she develops S/S of anemia. Patient said she will discuss with her daughter-in-law, who is her main transportation, and call the triage line if she wants to schedule an apt. Patient has never had a blood transfusion and will need a consent. Patient's care team notified via IB.     Neulasta Onpro On-Body injector applied to left arm at 1615 with light facing down.  Writer discussed Neulasta injection would start on 1/31/19 at 1915, approximately 27 hours after application applied today.  Written and Verbal instruction reviewed with patient.  Pt instructed when the dose delivery starts, it will take about 45 minutes to complete.  Pt aware Neulasta Onpro On-Body should have green flashing light and to call triage or on-call MD if injector flashes red or appears to be leaking. Pt aware to keep Onpro On-Body Neulasta 4 inches away from electrical equipment and to avoid showering 4 hours prior to injection.     Intravenous Access:  Implanted Port.    Treatment Conditions:  Lab Results   Component Value Date    HGB 8.2 01/30/2019     Lab Results   Component Value Date    WBC 2.5 01/30/2019      Lab Results   Component Value Date    ANEU 1.4 01/30/2019     Lab Results   Component Value Date     01/30/2019      Lab Results   Component Value Date     01/23/2019                   Lab Results   Component Value Date    POTASSIUM 4.3 01/23/2019           Lab  Results   Component Value Date    MAG 2.1 12/12/2018            Lab Results   Component Value Date    CR 0.97 01/23/2019                   Lab Results   Component Value Date    SHREYA 8.6 01/23/2019                Lab Results   Component Value Date    BILITOTAL 0.3 01/23/2019           Lab Results   Component Value Date    ALBUMIN 2.9 01/23/2019                    Lab Results   Component Value Date    ALT 18 01/23/2019           Lab Results   Component Value Date    AST 18 01/23/2019     Results reviewed, labs MET treatment parameters, ok to proceed with treatment.    Post Infusion Assessment:  Patient tolerated infusion without incident.  Blood return noted pre and post infusion.  Site patent and intact, free from redness, edema or discomfort.  No evidence of extravasations.  Access discontinued per protocol.    Discharge Plan:   Patient declined prescription refills.  Copy of AVS reviewed with patient and/or family. Patient will return 2/13/19 for next appointment.  Patient discharged in stable condition accompanied by: self and son.  Departure Mode: Ambulatory.    Jada Curry RN

## 2019-01-30 NOTE — TELEPHONE ENCOUNTER
Prescription approved per [facility] Refill Protocol.    Leanna Martin, RN  Triage Nurse

## 2019-01-30 NOTE — PATIENT INSTRUCTIONS
On-body Neulasta will start on 1/31/19 at 7:15pm. You can take it off at 8:15pm.    Contact Numbers    [facility] Main Line: [phone]  [facility] Triage and after hours / weekends / holidays:  [phone]      Please call the triage or after hours line if you experience a temperature greater than or equal to 100.5, shaking chills, have uncontrolled nausea, vomiting and/or diarrhea, dizziness, shortness of breath, chest pain, bleeding, unexplained bruising, or if you have any other new/concerning symptoms, questions or concerns.      If you are having any concerning symptoms or wish to speak to a provider before your next infusion visit, please call your care coordinator or triage to notify them so we can adequately serve you.     If you need a refill on a narcotic prescription or other medication, please call before your infusion appointment.     Recent Results (from the past 24 hour(s))   CBC with platelets differential    Collection Time: 01/30/19  2:13 PM   Result Value Ref Range    WBC 2.5 (L) 4.0 - 11.0 10e9/L    RBC Count 2.84 (L) 3.8 - 5.2 10e12/L    Hemoglobin 8.2 (L) 11.7 - 15.7 g/dL    Hematocrit 25.7 (L) 35.0 - 47.0 %    MCV 91 78 - 100 fl    MCH 28.9 26.5 - 33.0 pg    MCHC 31.9 31.5 - 36.5 g/dL    RDW 18.6 (H) 10.0 - 15.0 %    Platelet Count 321 150 - 450 10e9/L    Diff Method Automated Method     % Neutrophils 54.8 %    % Lymphocytes 33.3 %    % Monocytes 10.7 %    % Eosinophils 0.4 %    % Basophils 0.4 %    % Immature Granulocytes 0.4 %    Nucleated RBCs 0 0 /100    Absolute Neutrophil 1.4 (L) 1.6 - 8.3 10e9/L    Absolute Lymphocytes 0.8 0.8 - 5.3 10e9/L    Absolute Monocytes 0.3 0.0 - 1.3 10e9/L    Absolute Eosinophils 0.0 0.0 - 0.7 10e9/L    Absolute Basophils 0.0 0.0 - 0.2 10e9/L    Abs Immature Granulocytes 0.0 0 - 0.4 10e9/L    Absolute Nucleated RBC 0.0

## 2019-01-31 ENCOUNTER — TELEPHONE (OUTPATIENT)
Dept: ONCOLOGY | Facility: CLINIC | Age: 84
End: 2019-01-31

## 2019-01-31 NOTE — TELEPHONE ENCOUNTER
----- Message from Jaden Toledo MD sent at 1/31/2019  2:30 PM CST -----  Regarding: RE: FE Robleromarcus Rose - please call pt next Tuesday to follow-up and ensure she's asymptomatic. I don't expect her Hb to fall further, but ask for any evidence of bleeding.    Thanks,  AR  ----- Message -----  From: Jada Curry, ATUL  Sent: 1/30/2019   4:39 PM  To: Jaden Toledo MD, Rose Estrella, RN  Subject: FE                                              My note from today when patient was in for Gemzar.     Patient's Hgb 8.2. Patient denies SOB, dizziness when standing and abnormal fatigue. Patient is off of treatment next week. Writer discussed that patient could come in for a lab draw and possible transfusion next week, or just a lab draw, or wait and see if she becomes symptomatic. Patient decided to wait and see. She was instructed to call the triage line if she develops S/S of anemia. Patient said she will discuss with her daughter-in-law, who is her main transportation, and call the triage line if she wants to schedule an apt. Patient has never had a blood transfusion and will need a consent.     Thanks!  Jada

## 2019-01-31 NOTE — TELEPHONE ENCOUNTER
TC from pt's son, Issa, asking about possible transfusion. Reviewed Dr. Toledo's recommendations per inbasket message below. Issa stated understanding of plan and agrees to call before Tuesday if s/s of concern occur or pt exhibits active signs of bleeding.

## 2019-02-03 ENCOUNTER — APPOINTMENT (OUTPATIENT)
Dept: GENERAL RADIOLOGY | Facility: CLINIC | Age: 84
End: 2019-02-03
Payer: MEDICARE

## 2019-02-03 ENCOUNTER — HOSPITAL ENCOUNTER (EMERGENCY)
Facility: CLINIC | Age: 84
Discharge: HOME | End: 2019-02-04
Attending: EMERGENCY MEDICINE | Admitting: EMERGENCY MEDICINE
Payer: MEDICARE

## 2019-02-03 DIAGNOSIS — R00.2 PALPITATIONS: ICD-10-CM

## 2019-02-03 DIAGNOSIS — D64.9 ANEMIA, UNSPECIFIED TYPE: ICD-10-CM

## 2019-02-03 LAB
ALBUMIN SERPL-MCNC: 3.1 G/DL (ref 3.4–5)
ALP SERPL-CCNC: 128 U/L (ref 40–150)
ALT SERPL W P-5'-P-CCNC: 23 U/L (ref 0–50)
ANION GAP SERPL CALCULATED.3IONS-SCNC: 8 MMOL/L (ref 3–14)
ANISOCYTOSIS BLD QL SMEAR: SLIGHT
AST SERPL W P-5'-P-CCNC: 19 U/L (ref 0–45)
BASOPHILS # BLD AUTO: 0.2 10E9/L (ref 0–0.2)
BASOPHILS NFR BLD AUTO: 0.9 %
BILIRUB SERPL-MCNC: 0.3 MG/DL (ref 0.2–1.3)
BUN SERPL-MCNC: 22 MG/DL (ref 7–30)
CALCIUM SERPL-MCNC: 8.6 MG/DL (ref 8.5–10.1)
CHLORIDE SERPL-SCNC: 96 MMOL/L (ref 94–109)
CO2 SERPL-SCNC: 29 MMOL/L (ref 20–32)
CREAT SERPL-MCNC: 0.99 MG/DL (ref 0.52–1.04)
DIFFERENTIAL METHOD BLD: ABNORMAL
EOSINOPHIL # BLD AUTO: 0 10E9/L (ref 0–0.7)
EOSINOPHIL NFR BLD AUTO: 0 %
ERYTHROCYTE [DISTWIDTH] IN BLOOD BY AUTOMATED COUNT: 19.9 % (ref 10–15)
GFR SERPL CREATININE-BSD FRML MDRD: 52 ML/MIN/{1.73_M2}
GLUCOSE SERPL-MCNC: 121 MG/DL (ref 70–99)
HCT VFR BLD AUTO: 24.7 % (ref 35–47)
HGB BLD-MCNC: 7.8 G/DL (ref 11.7–15.7)
LYMPHOCYTES # BLD AUTO: 2.2 10E9/L (ref 0.8–5.3)
LYMPHOCYTES NFR BLD AUTO: 9.6 %
MAGNESIUM SERPL-MCNC: 1.8 MG/DL (ref 1.6–2.3)
MCH RBC QN AUTO: 29.2 PG (ref 26.5–33)
MCHC RBC AUTO-ENTMCNC: 31.6 G/DL (ref 31.5–36.5)
MCV RBC AUTO: 93 FL (ref 78–100)
METAMYELOCYTES # BLD: 0.2 10E9/L
METAMYELOCYTES NFR BLD MANUAL: 0.9 %
MONOCYTES # BLD AUTO: 0.4 10E9/L (ref 0–1.3)
MONOCYTES NFR BLD AUTO: 1.8 %
NEUTROPHILS # BLD AUTO: 19.4 10E9/L (ref 1.6–8.3)
NEUTROPHILS NFR BLD AUTO: 86.8 %
PLATELET # BLD AUTO: 188 10E9/L (ref 150–450)
PLATELET # BLD EST: ABNORMAL 10*3/UL
POIKILOCYTOSIS BLD QL SMEAR: SLIGHT
POLYCHROMASIA BLD QL SMEAR: ABNORMAL
POTASSIUM SERPL-SCNC: 4.3 MMOL/L (ref 3.4–5.3)
PROT SERPL-MCNC: 6.6 G/DL (ref 6.8–8.8)
RBC # BLD AUTO: 2.67 10E12/L (ref 3.8–5.2)
SODIUM SERPL-SCNC: 132 MMOL/L (ref 133–144)
T4 FREE SERPL-MCNC: 1.11 NG/DL (ref 0.76–1.46)
TROPONIN I SERPL-MCNC: <0.015 UG/L (ref 0–0.04)
TSH SERPL DL<=0.005 MIU/L-ACNC: 0.01 MU/L (ref 0.4–4)
WBC # BLD AUTO: 22.4 10E9/L (ref 4–11)

## 2019-02-03 PROCEDURE — 25000128 H RX IP 250 OP 636: Performed by: STUDENT IN AN ORGANIZED HEALTH CARE EDUCATION/TRAINING PROGRAM

## 2019-02-03 PROCEDURE — 80053 COMPREHEN METABOLIC PANEL: CPT | Performed by: STUDENT IN AN ORGANIZED HEALTH CARE EDUCATION/TRAINING PROGRAM

## 2019-02-03 PROCEDURE — 86850 RBC ANTIBODY SCREEN: CPT | Performed by: EMERGENCY MEDICINE

## 2019-02-03 PROCEDURE — 86923 COMPATIBILITY TEST ELECTRIC: CPT | Performed by: EMERGENCY MEDICINE

## 2019-02-03 PROCEDURE — 36430 TRANSFUSION BLD/BLD COMPNT: CPT | Performed by: EMERGENCY MEDICINE

## 2019-02-03 PROCEDURE — 83735 ASSAY OF MAGNESIUM: CPT | Performed by: STUDENT IN AN ORGANIZED HEALTH CARE EDUCATION/TRAINING PROGRAM

## 2019-02-03 PROCEDURE — 84439 ASSAY OF FREE THYROXINE: CPT | Performed by: STUDENT IN AN ORGANIZED HEALTH CARE EDUCATION/TRAINING PROGRAM

## 2019-02-03 PROCEDURE — 71046 X-RAY EXAM CHEST 2 VIEWS: CPT

## 2019-02-03 PROCEDURE — 85025 COMPLETE CBC W/AUTO DIFF WBC: CPT | Performed by: STUDENT IN AN ORGANIZED HEALTH CARE EDUCATION/TRAINING PROGRAM

## 2019-02-03 PROCEDURE — 86900 BLOOD TYPING SEROLOGIC ABO: CPT | Performed by: EMERGENCY MEDICINE

## 2019-02-03 PROCEDURE — 86901 BLOOD TYPING SEROLOGIC RH(D): CPT | Performed by: EMERGENCY MEDICINE

## 2019-02-03 PROCEDURE — 84443 ASSAY THYROID STIM HORMONE: CPT | Performed by: STUDENT IN AN ORGANIZED HEALTH CARE EDUCATION/TRAINING PROGRAM

## 2019-02-03 PROCEDURE — 96365 THER/PROPH/DIAG IV INF INIT: CPT | Performed by: EMERGENCY MEDICINE

## 2019-02-03 PROCEDURE — 93010 ELECTROCARDIOGRAM REPORT: CPT | Mod: Z6 | Performed by: EMERGENCY MEDICINE

## 2019-02-03 PROCEDURE — 84484 ASSAY OF TROPONIN QUANT: CPT | Performed by: STUDENT IN AN ORGANIZED HEALTH CARE EDUCATION/TRAINING PROGRAM

## 2019-02-03 PROCEDURE — 93005 ELECTROCARDIOGRAM TRACING: CPT | Performed by: EMERGENCY MEDICINE

## 2019-02-03 PROCEDURE — 99285 EMERGENCY DEPT VISIT HI MDM: CPT | Mod: 25 | Performed by: EMERGENCY MEDICINE

## 2019-02-03 RX ORDER — MAGNESIUM SULFATE 1 G/100ML
1 INJECTION INTRAVENOUS ONCE
Status: COMPLETED | OUTPATIENT
Start: 2019-02-03 | End: 2019-02-03

## 2019-02-03 RX ADMIN — MAGNESIUM SULFATE IN DEXTROSE 1 G: 10 INJECTION, SOLUTION INTRAVENOUS at 22:26

## 2019-02-03 NOTE — ED AVS SNAPSHOT
[facility], [place], Emergency Department  [address] [place] [id number]  Phone:  [phone]                                    Dimple Oviedo   MRN: [id number]    Department:  [facility], Emergency Department   Date of Visit:  2/3/2019           After Visit Summary Signature Page    I have received my discharge instructions, and my questions have been answered. I have discussed any challenges I see with this plan with the nurse or doctor.    ..........................................................................................................................................  Patient/Patient Representative Signature      ..........................................................................................................................................  Patient Representative Print Name and Relationship to Patient    ..................................................               ................................................  Date                                   Time    ..........................................................................................................................................  Reviewed by Signature/Title    ...................................................              ..............................................  Date                                               Time          22EPIC Rev 08/18

## 2019-02-04 VITALS
DIASTOLIC BLOOD PRESSURE: 78 MMHG | HEART RATE: 109 BPM | OXYGEN SATURATION: 97 % | SYSTOLIC BLOOD PRESSURE: 156 MMHG | TEMPERATURE: 98.3 F | WEIGHT: 144 LBS | BODY MASS INDEX: 31.33 KG/M2 | RESPIRATION RATE: 12 BRPM

## 2019-02-04 LAB
ABO + RH BLD: NORMAL
ABO + RH BLD: NORMAL
BLD GP AB SCN SERPL QL: NORMAL
BLD PROD TYP BPU: NORMAL
BLD PROD TYP BPU: NORMAL
BLD UNIT ID BPU: 0
BLOOD BANK CMNT PATIENT-IMP: NORMAL
BLOOD PRODUCT CODE: NORMAL
BPU ID: NORMAL
INTERPRETATION ECG - MUSE: NORMAL
NUM BPU REQUESTED: 1
SPECIMEN EXP DATE BLD: NORMAL
TRANSFUSION STATUS PATIENT QL: NORMAL
TRANSFUSION STATUS PATIENT QL: NORMAL

## 2019-02-04 PROCEDURE — 25000128 H RX IP 250 OP 636: Performed by: EMERGENCY MEDICINE

## 2019-02-04 PROCEDURE — P9016 RBC LEUKOCYTES REDUCED: HCPCS | Performed by: EMERGENCY MEDICINE

## 2019-02-04 RX ORDER — HEPARIN SODIUM (PORCINE) LOCK FLUSH IV SOLN 100 UNIT/ML 100 UNIT/ML
5 SOLUTION INTRAVENOUS ONCE
Status: COMPLETED | OUTPATIENT
Start: 2019-02-04 | End: 2019-02-04

## 2019-02-04 RX ORDER — HEPARIN SODIUM (PORCINE) LOCK FLUSH IV SOLN 100 UNIT/ML 100 UNIT/ML
5 SOLUTION INTRAVENOUS
Status: DISCONTINUED | OUTPATIENT
Start: 2019-02-04 | End: 2019-02-04 | Stop reason: CLARIF

## 2019-02-04 RX ADMIN — Medication 5 ML: at 06:23

## 2019-02-04 NOTE — ED TRIAGE NOTES
"Pt arrived to the ED with c/o feeling heart palpitations and SOA that started this evening. Per pt she is currently on chemotherapy for bladder cancer, last dose was Wednesday of this last week. Pt reported her last hemoglobin was  8 and \"the doctors have been monitoring.\" On arrival vitals stable, afebrile. Pt alert and oriented x4.   "

## 2019-02-04 NOTE — ED PROVIDER NOTES
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 Patient has been feeling generally unwell since chemotherapy last week (gemcitabine and carboplatin) although this afternoon has been having transient episodes of shortness of breath and \"palpitations.\" She states the \"palpitations\" are difficult for her to describe. Denies chest pain or pressure. She has felt lightheaded with standing but denies loss of consciousness. She has history of atrial fibrillation and wondering if she is \"going into into it.\" She has been eating and drinking normally. Denies orthopnea or PND. Denies weight gain or new peripheral edema. No recent fevers, chills, cough, abdominal pain, diarrhea, nausea/vomiting or dysuria. She denies new tremor. Has not missed any dosing of her medications including methimazole.     I have reviewed the Medications, Allergies, Past Medical and Surgical History, and Social History in the Epic system.    Review of Systems   All other systems reviewed and are negative.      Physical Exam   BP: 136/48  Heart Rate: 83  Temp: 98.1  F (36.7  C)  Resp: 16  Weight: 65.3 kg (144 lb)  SpO2: 95 %      Physical Exam   Constitutional: She is oriented to person, place, and time. She appears well-developed and well-nourished. No distress.   HENT:   Head: Normocephalic and atraumatic.   Mouth/Throat: Oropharynx is clear and moist. No oropharyngeal exudate.   Eyes: EOM are normal. Pupils are equal, round, and reactive to light. No scleral icterus.   Neck: Normal range of motion. Neck supple.   Cardiovascular: Normal rate, normal heart sounds and intact distal pulses.   No murmur heard.  Irregular rhythm   Pulmonary/Chest: Effort normal and breath sounds normal. She " has no wheezes. She has no rales.   Abdominal: Soft. Bowel sounds are normal. She exhibits no distension and no mass. There is no guarding.   Lymphadenopathy:     She has no cervical adenopathy.   Neurological: She is alert and oriented to person, place, and time.   Strength 5/5 in upper and lower extremities. No resting tremor.    Skin: Skin is warm and dry. Capillary refill takes less than 2 seconds. No rash noted. She is not diaphoretic.   Psychiatric: She has a normal mood and affect. Her behavior is normal. Thought content normal.       ED Course        Procedures             EKG Interpretation:      Interpreted by Ivan Guadarrama  Time reviewed: 20:45  Symptoms at time of EKG: None   Rhythm: normal sinus  and sinus arrhythmia  Rate: Normal  Axis: Left Axis Deviation  Ectopy: premature atrial contraction  Conduction: normal  ST Segments/ T Waves: No ST-T wave changes  Q Waves: none  Comparison to prior: New PACs    Clinical Impression: dysrhythmia - atrial    Critical Care time:  none  Labs Ordered and Resulted from Time of ED Arrival Up to the Time of Departure from the ED - No data to display         Assessments & Plan (with Medical Decision Making)   Dimple Oviedo is a 85 year old female with a [facility] 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 EKG demonstrates sinus rhythm with PACs. Differential includes arrhythmia, ACS, heart failure and anxiety. PE seems unlikely given she is not tachycardic or hypoxic. Will monitor on telemetry and check electrolytes, troponin and cell counts. Of note she had a Neulasta injection on 1/31/19. I would like to evaluate her thyroid function as given history of Graves disease. 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     She has a neutrophil predominant leukocytosis to 22.4 although notable in the setting of recent Neulasta. Anemic to 7.8, platelet count normal. Potassium normal at 4.3, magnesium 1.8. She is having some ongoing PACs but currently not symptomatic. Hyponatremic to 132. Renal function stable at 0.99. Patient's oncology team had been planning to give patient a blood transfusion if her Hgb dropped below 8 and given she is symptomatic today will plan to give patient a blood transfusion. Dr. Muir discussed with the patient's oncologist given anemia and leukocytosis. Transfusion ordered and consent obtained. Discussed option of admitting to observation although patient would like to be discharged if able after the transfusion.    I have reviewed the nursing notes.    I have reviewed the findings, diagnosis, plan and need for follow up with the patient.  Patient was discussed with Dr. Muir. Patient awaiting transfusion and will be discharged if no complications.   Ivan Guadarrama MD PGY-2     This data collected with the Resident working in the Emergency Department.  Patient was seen and evaluated by myself and I repeated the history and physical exam with the patient.  The plan of care was discussed with them.  The key portions of the note including the entire assessment and plan reflect my documentation.             Medication List      ASK your doctor about these medications    triamcinolone 40 MG/ML injection  Commonly known as:  KENALOG-40  40 mg, INTRA-ARTICULAR, ONCE  Ask about: Should I take this medication?            Final diagnoses:   None       2/3/2019   [facility], [place], EMERGENCY DEPARTMENT     Ivan Guadarrama MD  Resident  02/04/19 0047       Mason Muir MD  02/04/19 0111

## 2019-02-04 NOTE — DISCHARGE INSTRUCTIONS
You presented to the ED with chest palpitations and shortness of breath. You were found to be anemic, your hemoglobin level is less than 8. We discussed with your oncology team and decided to give a blood transfusion. The remainder of your work-up was unremarkable. Please follow-up with your oncology team as you had previously planned.

## 2019-02-04 NOTE — TELEPHONE ENCOUNTER
FUTURE VISIT INFORMATION      FUTURE VISIT INFORMATION:    Date: 2/12    Time: 940    Location: Vascular  REFERRAL INFORMATION:    Referring provider:  Dr. Toledo    Referring providers clinic:  Onc    Reason for visit/diagnosis  Left Renal Aneurysm    RECORDS REQUESTED FROM:       Clinic name Comments Records Status Imaging Status                                         All Records Internal

## 2019-02-06 ENCOUNTER — PATIENT OUTREACH (OUTPATIENT)
Dept: CARE COORDINATION | Facility: CLINIC | Age: 84
End: 2019-02-06

## 2019-02-06 DIAGNOSIS — R04.0 EPISTAXIS: Primary | ICD-10-CM

## 2019-02-06 DIAGNOSIS — R00.2 PALPITATIONS: ICD-10-CM

## 2019-02-06 NOTE — PROGRESS NOTES
Clinic Care Coordination Contact  [facility]/Voicemail    Referral Source: ED Follow-Up  ED visit 2/5/2019-Palpitations  Blood transfusion due to anemia   Clinical Data: Care Coordinator Outreach  Outreach attempted x 1.  Left message on voicemail with call back information and requested return call.  Plan:  Care Coordinator will try to reach patient again in 1-2 business days.  Omayra Gaitca RN / Clinical Care Coordinator     [facility]   [email or web address]  Office :  [phone] / Fax :  [phone]

## 2019-02-07 ENCOUNTER — HOSPITAL ENCOUNTER (EMERGENCY)
Facility: CLINIC | Age: 84
Discharge: HOME | End: 2019-02-07
Attending: FAMILY MEDICINE | Admitting: FAMILY MEDICINE
Payer: MEDICARE

## 2019-02-07 ENCOUNTER — TELEPHONE (OUTPATIENT)
Facility: CLINIC | Age: 84
End: 2019-02-07

## 2019-02-07 VITALS
HEIGHT: 57 IN | WEIGHT: 144 LBS | HEART RATE: 57 BPM | RESPIRATION RATE: 16 BRPM | SYSTOLIC BLOOD PRESSURE: 172 MMHG | OXYGEN SATURATION: 97 % | BODY MASS INDEX: 31.07 KG/M2 | DIASTOLIC BLOOD PRESSURE: 77 MMHG | TEMPERATURE: 98.2 F

## 2019-02-07 DIAGNOSIS — R04.0 EPISTAXIS: ICD-10-CM

## 2019-02-07 LAB
ABO + RH BLD: NORMAL
ABO + RH BLD: NORMAL
ALBUMIN SERPL-MCNC: 3.5 G/DL (ref 3.4–5)
ALP SERPL-CCNC: 178 U/L (ref 40–150)
ALT SERPL W P-5'-P-CCNC: 26 U/L (ref 0–50)
ANION GAP SERPL CALCULATED.3IONS-SCNC: 9 MMOL/L (ref 3–14)
ANISOCYTOSIS BLD QL SMEAR: SLIGHT
APTT PPP: 27 SEC (ref 22–37)
AST SERPL W P-5'-P-CCNC: 28 U/L (ref 0–45)
BASOPHILS # BLD AUTO: 0 10E9/L (ref 0–0.2)
BASOPHILS NFR BLD AUTO: 0 %
BILIRUB SERPL-MCNC: 0.3 MG/DL (ref 0.2–1.3)
BLD GP AB SCN SERPL QL: NORMAL
BLOOD BANK CMNT PATIENT-IMP: NORMAL
BUN SERPL-MCNC: 25 MG/DL (ref 7–30)
CALCIUM SERPL-MCNC: 9.3 MG/DL (ref 8.5–10.1)
CHLORIDE SERPL-SCNC: 99 MMOL/L (ref 94–109)
CO2 SERPL-SCNC: 29 MMOL/L (ref 20–32)
CREAT SERPL-MCNC: 0.98 MG/DL (ref 0.52–1.04)
DIFFERENTIAL METHOD BLD: ABNORMAL
EOSINOPHIL # BLD AUTO: 0 10E9/L (ref 0–0.7)
EOSINOPHIL NFR BLD AUTO: 0 %
ERYTHROCYTE [DISTWIDTH] IN BLOOD BY AUTOMATED COUNT: 19.4 % (ref 10–15)
GFR SERPL CREATININE-BSD FRML MDRD: 53 ML/MIN/{1.73_M2}
GLUCOSE SERPL-MCNC: 94 MG/DL (ref 70–99)
HCT VFR BLD AUTO: 28.8 % (ref 35–47)
HGB BLD-MCNC: 9.3 G/DL (ref 11.7–15.7)
INR PPP: 0.97 (ref 0.86–1.14)
LYMPHOCYTES # BLD AUTO: 1 10E9/L (ref 0.8–5.3)
LYMPHOCYTES NFR BLD AUTO: 3.7 %
MCH RBC QN AUTO: 29.7 PG (ref 26.5–33)
MCHC RBC AUTO-ENTMCNC: 32.3 G/DL (ref 31.5–36.5)
MCV RBC AUTO: 92 FL (ref 78–100)
MONOCYTES # BLD AUTO: 0.7 10E9/L (ref 0–1.3)
MONOCYTES NFR BLD AUTO: 2.8 %
NEUTROPHILS # BLD AUTO: 24.6 10E9/L (ref 1.6–8.3)
NEUTROPHILS NFR BLD AUTO: 93.5 %
NRBC # BLD AUTO: 0.2 10*3/UL
NRBC BLD AUTO-RTO: 1 /100
PLATELET # BLD AUTO: 55 10E9/L (ref 150–450)
PLATELET # BLD EST: ABNORMAL 10*3/UL
POIKILOCYTOSIS BLD QL SMEAR: SLIGHT
POLYCHROMASIA BLD QL SMEAR: SLIGHT
POTASSIUM SERPL-SCNC: 4 MMOL/L (ref 3.4–5.3)
PROT SERPL-MCNC: 7 G/DL (ref 6.8–8.8)
RBC # BLD AUTO: 3.13 10E12/L (ref 3.8–5.2)
SODIUM SERPL-SCNC: 137 MMOL/L (ref 133–144)
SPECIMEN EXP DATE BLD: NORMAL
WBC # BLD AUTO: 26.3 10E9/L (ref 4–11)

## 2019-02-07 PROCEDURE — 80053 COMPREHEN METABOLIC PANEL: CPT | Performed by: FAMILY MEDICINE

## 2019-02-07 PROCEDURE — 85025 COMPLETE CBC W/AUTO DIFF WBC: CPT | Performed by: FAMILY MEDICINE

## 2019-02-07 PROCEDURE — 86850 RBC ANTIBODY SCREEN: CPT | Performed by: FAMILY MEDICINE

## 2019-02-07 PROCEDURE — 86900 BLOOD TYPING SEROLOGIC ABO: CPT | Performed by: FAMILY MEDICINE

## 2019-02-07 PROCEDURE — 86901 BLOOD TYPING SEROLOGIC RH(D): CPT | Performed by: FAMILY MEDICINE

## 2019-02-07 PROCEDURE — 99283 EMERGENCY DEPT VISIT LOW MDM: CPT

## 2019-02-07 PROCEDURE — 99283 EMERGENCY DEPT VISIT LOW MDM: CPT | Mod: Z6 | Performed by: FAMILY MEDICINE

## 2019-02-07 PROCEDURE — 85730 THROMBOPLASTIN TIME PARTIAL: CPT | Performed by: FAMILY MEDICINE

## 2019-02-07 PROCEDURE — 85610 PROTHROMBIN TIME: CPT | Performed by: FAMILY MEDICINE

## 2019-02-07 PROCEDURE — 25000128 H RX IP 250 OP 636: Performed by: FAMILY MEDICINE

## 2019-02-07 RX ORDER — HEPARIN SODIUM (PORCINE) LOCK FLUSH IV SOLN 100 UNIT/ML 100 UNIT/ML
5 SOLUTION INTRAVENOUS
Status: DISCONTINUED | OUTPATIENT
Start: 2019-02-07 | End: 2019-02-08 | Stop reason: HOSPADM

## 2019-02-07 RX ORDER — OXYMETAZOLINE HYDROCHLORIDE 0.05 G/100ML
2 SPRAY NASAL ONCE
Status: DISCONTINUED | OUTPATIENT
Start: 2019-02-07 | End: 2019-02-08 | Stop reason: HOSPADM

## 2019-02-07 RX ORDER — LIDOCAINE 40 MG/G
CREAM TOPICAL
Status: DISCONTINUED | OUTPATIENT
Start: 2019-02-07 | End: 2019-02-08 | Stop reason: HOSPADM

## 2019-02-07 RX ADMIN — Medication 5 ML: at 21:15

## 2019-02-07 ASSESSMENT — ENCOUNTER SYMPTOMS
EYE REDNESS: 0
COLOR CHANGE: 0
SHORTNESS OF BREATH: 0
DIFFICULTY URINATING: 0
HEADACHES: 1
DIZZINESS: 1
ARTHRALGIAS: 0
CONFUSION: 0
FEVER: 0
NECK STIFFNESS: 0
ABDOMINAL PAIN: 0

## 2019-02-07 ASSESSMENT — MIFFLIN-ST. JEOR: SCORE: 972.06

## 2019-02-07 NOTE — TELEPHONE ENCOUNTER
"Pt and daughter called in to triage reporting nosebleed from L nostril x45 minutes. Pt has used 4 tissues up the nostril and report bleeding as \"small\" but not stopping. Denied any other bleeding or bruising, does not feel dizzy, lightheaded or weak. Daughter thinks she looks a little pale. Pt received 1u PRBC at the ED on 2/3 for hgb 7.8, was told to call back oncology with additional signs of bleeding. Pt state hx of polyp in L nostril and cauterizing +20 years ago, recently saw FP for recent nosebleeds. State she no longer sees an ENT.    Writer did advise pt to apply ice pack or frozen vegetables to bridge of nose and tip head forward, take tissues out of nostril and just apply light pressure to bridge of nose for 10-15 min. Will page Dr. Toledo for advice.    Per Dr. Toledo: if bleeding doesn't stop in the next 30 min, pt to go to ED. If bleeding stops, pt may come into clinic tomorrow, see DARON with CBC w/ platelets/diff check and set up for 1u platelets if needed. Called pt and daughter Day back with instructions, scheduled for labs 2/8 at 9 am, see Khadra SHAH at 9:30 am. Advised if they go to ED tonight they may call back to cancel apt tomorrow, both verbalized understanding.   "

## 2019-02-07 NOTE — ED AVS SNAPSHOT
[facility], [place], Emergency Department  [address] [place] [id number]  Phone:  [phone]                                    Dimple Oviedo   MRN: [id number]    Department:  [facility], Emergency Department   Date of Visit:  2/7/2019           After Visit Summary Signature Page    I have received my discharge instructions, and my questions have been answered. I have discussed any challenges I see with this plan with the nurse or doctor.    ..........................................................................................................................................  Patient/Patient Representative Signature      ..........................................................................................................................................  Patient Representative Print Name and Relationship to Patient    ..................................................               ................................................  Date                                   Time    ..........................................................................................................................................  Reviewed by Signature/Title    ...................................................              ..............................................  Date                                               Time          22EPIC Rev 08/18

## 2019-02-08 ENCOUNTER — TELEPHONE (OUTPATIENT)
Facility: CLINIC | Age: 84
End: 2019-02-08

## 2019-02-08 DIAGNOSIS — C66.1 UROTHELIAL CARCINOMA OF RIGHT DISTAL URETER (H): Primary | ICD-10-CM

## 2019-02-08 ASSESSMENT — ENCOUNTER SYMPTOMS
ACTIVITY CHANGE: 1
DECREASED CONCENTRATION: 0
SINUS PRESSURE: 0
DYSPHORIC MOOD: 0
VOMITING: 0
BRUISES/BLEEDS EASILY: 0
TROUBLE SWALLOWING: 0
WEAKNESS: 1
SINUS PAIN: 0
CHEST TIGHTNESS: 0
NAUSEA: 0

## 2019-02-08 NOTE — ED TRIAGE NOTES
Pt presents to triage in a wheelchair with c/o a bloody nose that began around 3:30 pm today. Denies taking blood thinners, but states she takes a baby ASA at night.

## 2019-02-08 NOTE — DISCHARGE INSTRUCTIONS
Home.  Your hemoglobin and platelets were OK, no need to transfuse per Oncology.  Keep head elevated.  Avoid blowing nose for next few days.  Use the neosynephrine spray in left nostril twice a day as needed for bleeding.  Use nose clip if rebleeding and lean forward for 20 minutes.  Call oncology clinic tomorrow to update on your symptoms and further recommendations.  Return if any concerns at all.

## 2019-02-08 NOTE — ED PROVIDER NOTES
[facility] EMERGENCY DEPARTMENT ([facility])  2/07/19   History     Chief Complaint   Patient presents with     Epistaxis     The history is provided by the patient, a relative and medical records.     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  Per review of patient's chart, patient was seen in the Emergency Department here on 2/3/2019 complaining of palpitations and shortness of breath.  The patient was found to be anemic with a hemoglobin of 7.8; she was given 1 unit of PRBCs in the Emergency Department.  She was told to contact oncology if she had any additional signs of bleeding.    The patient presents to the Emergency Department today for evaluation of epistaxis.  The patient reports that she began bleeding from the left nare at approximately 3:30 PM today; she reports losing a significant amount of blood as well as passing multiple clots.  The patient's nosebleed has now stopped here in the Emergency Department.  She is complaining of a headache and notes that she had some dizziness when she was on her way here to the Emergency Department.  She denies any chest pain or shortness of breath.  She denies being on any anticoagulation medications, but does note that she is on an aspirin 81 mg every night.  She denies anything draining down the back of her throat currently.  The patient notes that she had a small amount of bleeding, only 6 drops of blood, on 2/2/2019, but denies any nosebleeds at that time.    I have reviewed the Medications, Allergies, Past Medical and Surgical History, and Social History in the [facility] system.    Past Medical History:   Diagnosis Date     Calculus of kidney      Esophageal reflux      GERD (gastroesophageal reflux disease)      Hyperlipidemia LDL goal <130 5/9/2010     Malignant melanoma of skin of trunk, except  scrotum (H)      Nonspecific abnormal finding     has living will 2004 -      Nontoxic multinodular goiter     no further eval /tx rec per pt     Osteopenia      Other psoriasis      Personal history of colonic polyps      PMR (polymyalgia rheumatica) (H)      Stress-induced cardiomyopathy      Undiagnosed cardiac murmurs      Unspecified constipation      Unspecified essential hypertension      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       Past Surgical History:   Procedure Laterality Date     BIOPSY       C NONSPECIFIC PROCEDURE  2005    colonoscopy polyp repeat 2010     COLONOSCOPY  2014     COMBINED CYSTOSCOPY, INSERT STENT URETER(S) Bilateral 9/12/2018    Procedure: COMBINED CYSTOSCOPY, INSERT STENT URETER(S);  Cystoscopy Bilateral ureteral Stent Placement.;  Surgeon: Justin Henry MD;  [facility]     COMBINED CYSTOSCOPY, RETROGRADES, URETEROSCOPY, INSERT STENT Bilateral 4/3/2018    Procedure: COMBINED CYSTOSCOPY, RETROGRADES, URETEROSCOPY, INSERT STENT;;  Surgeon: Stuart King MD;  [facility]     COMBINED CYSTOSCOPY, RETROGRADES, URETEROSCOPY, INSERT STENT Bilateral 9/10/2018    Procedure: COMBINED CYSTOSCOPY, RETROGRADES, URETEROSCOPY, INSERT STENT;  Cystoscopy, Bilateral Ureteroscopy, Bladder Biopsies, Retrogram Pyelograms, Ureteral Washings and brushings, cysview;  Surgeon: Stuart King MD;  [facility]     CYSTOSCOPY, BIOPSY BLADDER INSTILL OPTICAL AGENT N/A 4/3/2018    Procedure: CYSTOSCOPY, BIOPSY BLADDER INSTILL OPTICAL AGENT;  Cystoscopy, Blue Light Cystoscopy, Bladder Biopsies, Bilateral Selective ureteral washings for Cytology, Bilateral Retrograde Pyelograms, Bilateral Ureteroscopy;  Surgeon: Stuart King MD;  [facility]     CYSTOSCOPY, BIOPSY BLADDER, COMBINED N/A 2/19/2018    Procedure: COMBINED CYSTOSCOPY, BIOPSY BLADDER;  Cystoscopy, Bladder Biopsy;  Surgeon: Kenna La MD;  [facility]: [facility] OR     CYSTOSCOPY, REMOVE STENT(S),  COMBINED  11/13/2018    Procedure: Flexible Cystoscopy with Stent Removal;  Surgeon: Stuart King MD;  [facility]     DAVINCI LYMPHADENECTOMY N/A 11/13/2018    Procedure: Davinci Lymphadenectomy ;  Surgeon: Stuart King MD;  [facility]     DAVINCI NEPHROURETERECTOMY N/A 11/13/2018    Procedure: Right DaVinci Assisted Nephroureterectomy;  Surgeon: Stuart King MD;  [facility]     ENDOSCOPIC ULTRASOUND LOWER GASTROINTESTIONAL TRACT (GI) N/A 10/30/2015    Procedure: ENDOSCOPIC ULTRASOUND LOWER GASTROINTESTIONAL TRACT (GI);  Surgeon: Daniel Jean-Baptiste MD;  [facility]     EYE SURGERY  12/4/17     INSERT PORT VASCULAR ACCESS Right 12/19/2018    Procedure: Chest Port Placement - right;  Surgeon: Stuart Chavez PA-C;  [facility]     IR CHEST PORT PLACEMENT > 5 YRS OF AGE  12/19/2018     LAPAROSCOPIC CHOLECYSTECTOMY WITH CHOLANGIOGRAMS N/A 11/1/2015    Procedure: LAPAROSCOPIC CHOLECYSTECTOMY WITH CHOLANGIOGRAMS;  Surgeon: Tonie Warren MD;  [facility]     SURGICAL HISTORY OF -   1996    malignant melanoma     SURGICAL HISTORY OF -   1968    thyroid nodule     SURGICAL HISTORY OF -       D & C       Family History   Problem Relation Age of Onset     Cancer Father         dec - esophageal and laryngeal     Heart Disease Mother      Respiratory Mother         dec     Breast Cancer Daughter      Other Cancer Daughter      Thyroid Disease Daughter      Asthma Daughter      Hyperlipidemia Son      Diabetes Son        Social History     Tobacco Use     Smoking status: Never Smoker     Smokeless tobacco: Never Used   Substance Use Topics     Alcohol use: No     Alcohol/week: 0.0 oz       No current facility-administered medications for this encounter.      Current Outpatient Medications   Medication     ferrous sulfate 325 (65 Fe) MG TBEC EC tablet     furosemide (LASIX) 20 MG tablet     irbesartan (AVAPRO) 300 MG tablet     omeprazole  (PRILOSEC) 20 MG DR capsule     propranolol ER (INDERAL LA) 60 MG 24 hr capsule     rOPINIRole (REQUIP) 0.25 MG tablet     acetaminophen (TYLENOL) 650 MG CR tablet     alendronate (FOSAMAX) 70 MG tablet     aspirin 81 MG tablet     Calcium Citrate-Vitamin D (CALCIUM + D PO)     cycloSPORINE (RESTASIS) 0.05 % ophthalmic emulsion     lidocaine-prilocaine (EMLA) 2.5-2.5 % external cream     LORazepam (ATIVAN) 0.5 MG tablet     lovastatin (MEVACOR) 40 MG tablet     methimazole (TAPAZOLE) 5 MG tablet     nitroGLYcerin (NITROSTAT) 0.4 MG sublingual tablet     Omega-3 Fatty Acids (FISH OIL) 500 MG CAPS     ondansetron (ZOFRAN) 8 MG tablet     order for DME     polyethylene glycol (MIRALAX/GLYCOLAX) packet     Probiotic Product (PROBIOTIC ADVANCED PO)     prochlorperazine (COMPAZINE) 10 MG tablet     senna-docusate (SENOKOT-S;PERICOLACE) 8.6-50 MG per tablet     sertraline (ZOLOFT) 100 MG tablet     traZODone (DESYREL) 50 MG tablet        Allergies   Allergen Reactions     Codeine          Review of Systems   Constitutional: Positive for activity change (uses walker stable). Negative for fever.   HENT: Positive for nosebleeds (left nare improved now). Negative for congestion, sinus pressure, sinus pain and trouble swallowing.    Eyes: Negative for redness.   Respiratory: Negative for chest tightness and shortness of breath.    Cardiovascular: Negative for chest pain.   Gastrointestinal: Negative for abdominal pain, nausea and vomiting.   Genitourinary: Negative for difficulty urinating.   Musculoskeletal: Negative for arthralgias and neck stiffness.   Skin: Negative for color change and rash.   Neurological: Positive for dizziness, weakness (mild) and headaches.   Hematological: Does not bruise/bleed easily.   Psychiatric/Behavioral: Negative for confusion, decreased concentration and dysphoric mood.   All other systems reviewed and are negative.      Physical Exam   BP: 196/89  Pulse: 56  Temp: 98.2  F (36.8  C)  Resp:  "16  Height: 144.8 cm (4' 9\")  Weight: 65.3 kg (144 lb)  SpO2: 98 %      Physical Exam   Constitutional: She is oriented to person, place, and time. She appears well-developed and well-nourished. No distress.   HENT:   Head: Normocephalic and atraumatic.   Left nostril bleeding clotted without post bleeding.   Eyes: EOM are normal. Pupils are equal, round, and reactive to light. No scleral icterus.   Neck: Normal range of motion. Neck supple.   Cardiovascular: Normal rate.   Pulmonary/Chest: No respiratory distress.   Abdominal: There is no tenderness.   Musculoskeletal: She exhibits no deformity.   Neurological: She is alert and oriented to person, place, and time.   nonfocal   Skin: Skin is warm and dry. Capillary refill takes less than 2 seconds. No rash noted. She is not diaphoretic. No erythema. No pallor.   Psychiatric:   Appropriate     Nursing note and vitals reviewed.      ED Course   6:22 PM  The patient was seen and examined by Lorne Macdonald MD in Room ED15.        Procedures   Patient evaluated ER.  We did access patient's port.  Patient received Neupogen and steroids a week ago with chemotherapy.  3 days ago white count was elevated.  We will recheck labs today white count is 26,300 heme globin is 9.3 platelets are 55,000.  We did use Mohsen-Synephrine on cotton    Ball and placed in left nostril for half hour this was removed.  No active bleeding or clots are seen.  Patient otherwise stable.  Discussed case with oncology also at this point no indications for transfusion for packed cells or platelets etc.  Patient at this point is comfortable going home I sent her home with a Mohsen-Synephrine to use if any rebleeding also a nose clip instructions given contacting clinic tomorrow to update her symptoms return if any concerns at all patient discharged with son.             Critical Care time:  none             Labs Ordered and Resulted from Time of ED Arrival Up to the Time of Departure from the ED   CBC WITH " PLATELETS DIFFERENTIAL - Abnormal; Notable for the following components:       Result Value    WBC 26.3 (*)     RBC Count 3.13 (*)     Hemoglobin 9.3 (*)     Hematocrit 28.8 (*)     RDW 19.4 (*)     Platelet Count 55 (*)     Nucleated RBCs 1 (*)     Absolute Neutrophil 24.6 (*)     All other components within normal limits   COMPREHENSIVE METABOLIC PANEL - Abnormal; Notable for the following components:    GFR Estimate 53 (*)     Alkaline Phosphatase 178 (*)     All other components within normal limits   INR   PARTIAL THROMBOPLASTIN TIME   ABO/RH TYPE AND SCREEN           Results for orders placed or performed during the hospital encounter of 02/07/19   CBC with platelets differential   Result Value Ref Range    WBC 26.3 (H) 4.0 - 11.0 10e9/L    RBC Count 3.13 (L) 3.8 - 5.2 10e12/L    Hemoglobin 9.3 (L) 11.7 - 15.7 g/dL    Hematocrit 28.8 (L) 35.0 - 47.0 %    MCV 92 78 - 100 fl    MCH 29.7 26.5 - 33.0 pg    MCHC 32.3 31.5 - 36.5 g/dL    RDW 19.4 (H) 10.0 - 15.0 %    Platelet Count 55 (L) 150 - 450 10e9/L    Diff Method Manual Differential     % Neutrophils 93.5 %    % Lymphocytes 3.7 %    % Monocytes 2.8 %    % Eosinophils 0.0 %    % Basophils 0.0 %    Nucleated RBCs 1 (H) 0 /100    Absolute Neutrophil 24.6 (H) 1.6 - 8.3 10e9/L    Absolute Lymphocytes 1.0 0.8 - 5.3 10e9/L    Absolute Monocytes 0.7 0.0 - 1.3 10e9/L    Absolute Eosinophils 0.0 0.0 - 0.7 10e9/L    Absolute Basophils 0.0 0.0 - 0.2 10e9/L    Absolute Nucleated RBC 0.2     Anisocytosis Slight     Poikilocytosis Slight     Polychromasia Slight     Platelet Estimate Confirming automated cell count    INR   Result Value Ref Range    INR 0.97 0.86 - 1.14   Partial thromboplastin time   Result Value Ref Range    PTT 27 22 - 37 sec   Comprehensive metabolic panel   Result Value Ref Range    Sodium 137 133 - 144 mmol/L    Potassium 4.0 3.4 - 5.3 mmol/L    Chloride 99 94 - 109 mmol/L    Carbon Dioxide 29 20 - 32 mmol/L    Anion Gap 9 3 - 14 mmol/L    Glucose 94  70 - 99 mg/dL    Urea Nitrogen 25 7 - 30 mg/dL    Creatinine 0.98 0.52 - 1.04 mg/dL    GFR Estimate 53 (L) >60 mL/min/[1.73_m2]    GFR Estimate If Black 61 >60 mL/min/[1.73_m2]    Calcium 9.3 8.5 - 10.1 mg/dL    Bilirubin Total 0.3 0.2 - 1.3 mg/dL    Albumin 3.5 3.4 - 5.0 g/dL    Protein Total 7.0 6.8 - 8.8 g/dL    Alkaline Phosphatase 178 (H) 40 - 150 U/L    ALT 26 0 - 50 U/L    AST 28 0 - 45 U/L   ABO/Rh type and screen   Result Value Ref Range    ABO O     RH(D) Pos     Antibody Screen Neg     Test Valid Only At          [facility]    Specimen Expires 02/10/2019          Assessments & Plan (with Medical Decision Making)  85-year-old female who has 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  Patient presented today for nosebleed for the last 2 hours.  Symptoms now improved she was mildly hypertensive here in the ER but that resolved on its own.  Bleeding had stopped she has some clot in the left nostril but no active bleeding otherwise noted.  We did place some Mohsen-Synephrine on cotton balls placed in her nostril removed reinspected at this point I did not cauterize anything we had adequate hemostasis.  I felt packing her would aggravate her symptoms at this point patient was otherwise stable.  Her labs reviewed her hemoglobin is 9.3 white count 26,300 which oncology feels is still related to her Neupogen and steroids that she received a week ago.  She has no other signs of infection etc. in the ER.  Platelets were 55,000.  No indication for transfusion patient will be discharged home with son with the Mohsen-Synephrine to use cotton balls and nasal clip if needed return if worsening symptoms discussed as far as precautions patient agrees and got up to the bathroom feeling fine was discharged with son.         I have reviewed the nursing notes.    I have reviewed the findings, diagnosis, plan and need  for follow up with the patient.       Medication List      ASK your doctor about these medications    triamcinolone 40 MG/ML injection  Commonly known as:  KENALOG-40  40 mg, INTRA-ARTICULAR, ONCE  Ask about: Should I take this medication?            Final diagnoses:   Epistaxis     IEugenio, am serving as a trained medical scribe to document services personally performed by Lorne Macdonald MD, based on the provider's statements to me.   Lorne INFANTE MD, was physically present and have reviewed and verified the accuracy of this note documented by Eugenio Marin.    2/7/2019   [facility], [place], EMERGENCY DEPARTMENT     Lorne Macdonald MD  02/08/19 0115

## 2019-02-08 NOTE — PROGRESS NOTES
Clinic Care Coordination Contact  [facility]/Voicemail    Referral Source: ED Follow-Up  Clinical Data: Care Coordinator Outreach  2/7/2019--Epitaxsis   ED visit 2/5/2019-Palpitations  Blood transfusion due to anemia   Outreach attempted x 2.  Left message on voicemail with call back information and requested return call.  Plan: . Care Coordinator will do no further outreaches at this time.  Omayra Gatica RN / Clinical Care Coordinator     [facility]   [email or web address]  Office :  [phone] / Fax :  [phone]

## 2019-02-08 NOTE — TELEPHONE ENCOUNTER
"Pt was seen in ED last for nosebleed, hgb 9.3 and plt 55. Per Dr. Toledo, does not need to see Khadra SHAH or labs today. Pt to hold aspirin for 5 days, recheck labs on Monday and see him on Wed as prev scheduled. Called pt and relayed information. She verbalized understanding and stated no recent MI or heart block, was taking baby ASA \"I thought it was good for my heart so I started it\". Pt state she will go to [facility] Monday for labs, orders entered.  "

## 2019-02-11 ENCOUNTER — CARE COORDINATION (OUTPATIENT)
Dept: ONCOLOGY | Facility: CLINIC | Age: 84
End: 2019-02-11

## 2019-02-11 DIAGNOSIS — C66.1 UROTHELIAL CARCINOMA OF RIGHT DISTAL URETER (H): ICD-10-CM

## 2019-02-11 LAB
ALBUMIN SERPL-MCNC: 3.7 G/DL (ref 3.4–5)
ALP SERPL-CCNC: 144 U/L (ref 40–150)
ALT SERPL W P-5'-P-CCNC: 24 U/L (ref 0–50)
ANION GAP SERPL CALCULATED.3IONS-SCNC: 6 MMOL/L (ref 3–14)
AST SERPL W P-5'-P-CCNC: 23 U/L (ref 0–45)
BASOPHILS # BLD AUTO: 0 10E9/L (ref 0–0.2)
BASOPHILS NFR BLD AUTO: 0.3 %
BILIRUB SERPL-MCNC: 0.3 MG/DL (ref 0.2–1.3)
BUN SERPL-MCNC: 31 MG/DL (ref 7–30)
CALCIUM SERPL-MCNC: 9.6 MG/DL (ref 8.5–10.1)
CHLORIDE SERPL-SCNC: 99 MMOL/L (ref 94–109)
CO2 SERPL-SCNC: 29 MMOL/L (ref 20–32)
CREAT SERPL-MCNC: 1.18 MG/DL (ref 0.52–1.04)
DIFFERENTIAL METHOD BLD: ABNORMAL
EOSINOPHIL # BLD AUTO: 0 10E9/L (ref 0–0.7)
EOSINOPHIL NFR BLD AUTO: 0.1 %
ERYTHROCYTE [DISTWIDTH] IN BLOOD BY AUTOMATED COUNT: 21.1 % (ref 10–15)
GFR SERPL CREATININE-BSD FRML MDRD: 42 ML/MIN/{1.73_M2}
GLUCOSE SERPL-MCNC: 117 MG/DL (ref 70–99)
HCT VFR BLD AUTO: 32.6 % (ref 35–47)
HGB BLD-MCNC: 10.1 G/DL (ref 11.7–15.7)
IMM GRANULOCYTES # BLD: 0.3 10E9/L (ref 0–0.4)
IMM GRANULOCYTES NFR BLD: 1.6 %
LYMPHOCYTES # BLD AUTO: 1.6 10E9/L (ref 0.8–5.3)
LYMPHOCYTES NFR BLD AUTO: 10 %
MCH RBC QN AUTO: 28.9 PG (ref 26.5–33)
MCHC RBC AUTO-ENTMCNC: 31 G/DL (ref 31.5–36.5)
MCV RBC AUTO: 93 FL (ref 78–100)
MONOCYTES # BLD AUTO: 1.4 10E9/L (ref 0–1.3)
MONOCYTES NFR BLD AUTO: 8.8 %
NEUTROPHILS # BLD AUTO: 12.3 10E9/L (ref 1.6–8.3)
NEUTROPHILS NFR BLD AUTO: 79.2 %
NRBC # BLD AUTO: 0.1 10*3/UL
NRBC BLD AUTO-RTO: 0 /100
PLATELET # BLD AUTO: 98 10E9/L (ref 150–450)
POTASSIUM SERPL-SCNC: 4.8 MMOL/L (ref 3.4–5.3)
PROT SERPL-MCNC: 7.7 G/DL (ref 6.8–8.8)
RBC # BLD AUTO: 3.49 10E12/L (ref 3.8–5.2)
SODIUM SERPL-SCNC: 134 MMOL/L (ref 133–144)
WBC # BLD AUTO: 15.6 10E9/L (ref 4–11)

## 2019-02-11 PROCEDURE — 85025 COMPLETE CBC W/AUTO DIFF WBC: CPT | Performed by: INTERNAL MEDICINE

## 2019-02-11 PROCEDURE — 36415 COLL VENOUS BLD VENIPUNCTURE: CPT | Performed by: INTERNAL MEDICINE

## 2019-02-11 PROCEDURE — 80053 COMPREHEN METABOLIC PANEL: CPT | Performed by: INTERNAL MEDICINE

## 2019-02-11 NOTE — PROGRESS NOTES
Clinic Care Coordination RN       Late entry from 2/8/2019- patient left a message on CC VM they she is tired and very little energy.  Patient has a blood draw on Monday 2/11/2019      Omayra Gatica RN / Clinical Care Coordinator     [facility]   [email or web address]  Office :  [phone] / Fax :  [phone]

## 2019-02-11 NOTE — PROGRESS NOTES
TC from pt's daughter-in-law, Day, asking what the purpose of pt's appt is tomorrow. 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 Day stated understanding but is concerned about the likelihood of getting a lot of snow tomorrow and it being difficult to get to the clinic. Advised Day to wait until the morning and see what the weather looks like before cancelling. Day stated understanding and stated understanding of how serious an aneurism can be and will try get into the clinic tomorrow.

## 2019-02-12 ENCOUNTER — ANCILLARY PROCEDURE (OUTPATIENT)
Dept: ULTRASOUND IMAGING | Facility: CLINIC | Age: 84
End: 2019-02-12
Attending: RADIOLOGY
Payer: MEDICARE

## 2019-02-12 ENCOUNTER — PRE VISIT (OUTPATIENT)
Dept: VASCULAR SURGERY | Facility: CLINIC | Age: 84
End: 2019-02-12

## 2019-02-12 ENCOUNTER — OFFICE VISIT (OUTPATIENT)
Dept: VASCULAR SURGERY | Facility: CLINIC | Age: 84
End: 2019-02-12
Payer: MEDICARE

## 2019-02-12 VITALS — HEART RATE: 58 BPM | DIASTOLIC BLOOD PRESSURE: 67 MMHG | SYSTOLIC BLOOD PRESSURE: 149 MMHG | OXYGEN SATURATION: 97 %

## 2019-02-12 DIAGNOSIS — I72.2 RENAL ARTERY ANEURYSM (H): Primary | ICD-10-CM

## 2019-02-12 DIAGNOSIS — I72.2 RENAL ARTERY ANEURYSM (H): ICD-10-CM

## 2019-02-12 ASSESSMENT — PAIN SCALES - GENERAL: PAINLEVEL: NO PAIN (0)

## 2019-02-12 NOTE — PATIENT INSTRUCTIONS
We have scheduled you for your Ultrasound Renal today at 12pm. Please check into the 1st floor Imaging at 1145am.     The purpose of this Ultrasound to closely look at the Artery that we are planning on treating.       I will call you with the appointment details should we move forward and treat.       Procedure Prep:     -The Gold Waiting room is located at [facility], located at [address] [place] [address].     Reminders:     -No food or milk products 6 hours prior to procedure time.     -No clear liquids 2 hours prior to procedure time.     -Please take your morning medications as indicated.    -You will also be spending the night, so you may pack an overnight bag.       -As a reminder, we will have you follow up in  one month after this procedure with imaging and an appointment with your doctor.       *Please watch for the following symptoms:     This includes:    -high fevers 101-102, which may last for a couple of days. We recommend using over the counter medications such as Tylenol or Ibuprofen.     -Nausea, in which we will give you medications after you are discharged home.     -Back pain that may radiate through to the lower back  - if this happens please call me to inform you of your symptoms.    -Urination: please watch your urine color. This should be a normal color not dark. If you cannot void or notice swelling in your legs, please let me know right away.    **Please keep in mind to stay hydrated after the procedure. At  Least 6 glasses of water a day.      *Abnormal symptoms in which to call or seek immediate help:  1. Severe back pain that radiates around to the abdomen that does not go away with pain medication.  2. Swelling/tingling/numbness of the lower legs.  3. High fevers that do not come down with over the counter medications.   4. Uncontrollable nausea along with vomiting      Should ANY of the above symptoms are exhibited, please seek immediate help or  call our hospital at [phone] and ask for the Interventional Radiologist On-call (after hours) or you can contact me (during business hours) 8-4pm.      Please review your schedule and should you have any further questions, you may call me at my direct line.     Sincerely,     Bibi Tompkins RN, BSN  Interventional Radiology Nurse Coordinator   Phone: [phone]

## 2019-02-12 NOTE — NURSING NOTE
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

## 2019-02-12 NOTE — LETTER
2/12/2019       RE: Dimple Oviedo  [address]  [facility] [address]     Dear Colleague,    Thank you for referring your patient, Dimple Oviedo, to the [facility] at [facility]. Please see a copy of my visit note below.          INTERVENTIONAL RADIOLOGY CONSULTATION    Name: Dimple Oviedo  Age: 85 year old   Referring Physician: Dr. Toledo   REASON FOR REFERRAL: Renal Artery Aneursym     HPI: Ms. 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  She is currently on chemotherapy.     She does not have any symptoms related to left renal aneurysm.  Denies back pain, hematuria or hypertension.  CT from 1/22/2019 demonstrates 1.8 cm aneurysm of the left renal and artery which appears minimally increased in size compared to 2015.    PAST MEDICAL HISTORY:   Past Medical History:   Diagnosis Date     Calculus of kidney      Esophageal reflux      GERD (gastroesophageal reflux disease)      Hyperlipidemia LDL goal <130 5/9/2010     Malignant melanoma of skin of trunk, except scrotum (H)      Nonspecific abnormal finding     has living will 2004 -      Nontoxic multinodular goiter     no further eval /tx rec per pt     Osteopenia      Other psoriasis      Personal history of colonic polyps      PMR (polymyalgia rheumatica) (H)      Stress-induced cardiomyopathy      Undiagnosed cardiac murmurs      Unspecified constipation      Unspecified essential hypertension      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       PAST SURGICAL HISTORY:   Past Surgical History:   Procedure Laterality Date     BIOPSY       C NONSPECIFIC PROCEDURE  2005    colonoscopy polyp repeat 2010     COLONOSCOPY  2014     COMBINED CYSTOSCOPY, INSERT STENT URETER(S) Bilateral 9/12/2018    Procedure: COMBINED CYSTOSCOPY, INSERT STENT URETER(S);  Cystoscopy Bilateral  ureteral Stent Placement.;  Surgeon: Justin Henry MD;  [facility]     COMBINED CYSTOSCOPY, RETROGRADES, URETEROSCOPY, INSERT STENT Bilateral 4/3/2018    Procedure: COMBINED CYSTOSCOPY, RETROGRADES, URETEROSCOPY, INSERT STENT;;  Surgeon: Stuart King MD;  [facility]     COMBINED CYSTOSCOPY, RETROGRADES, URETEROSCOPY, INSERT STENT Bilateral 9/10/2018    Procedure: COMBINED CYSTOSCOPY, RETROGRADES, URETEROSCOPY, INSERT STENT;  Cystoscopy, Bilateral Ureteroscopy, Bladder Biopsies, Retrogram Pyelograms, Ureteral Washings and brushings, cysview;  Surgeon: Stuart King MD;  [facility]     CYSTOSCOPY, BIOPSY BLADDER INSTILL OPTICAL AGENT N/A 4/3/2018    Procedure: CYSTOSCOPY, BIOPSY BLADDER INSTILL OPTICAL AGENT;  Cystoscopy, Blue Light Cystoscopy, Bladder Biopsies, Bilateral Selective ureteral washings for Cytology, Bilateral Retrograde Pyelograms, Bilateral Ureteroscopy;  Surgeon: Stuart King MD;  [facility]     CYSTOSCOPY, BIOPSY BLADDER, COMBINED N/A 2/19/2018    Procedure: COMBINED CYSTOSCOPY, BIOPSY BLADDER;  Cystoscopy, Bladder Biopsy;  Surgeon: Kenna La MD;  [facility]     CYSTOSCOPY, REMOVE STENT(S), COMBINED  11/13/2018    Procedure: Flexible Cystoscopy with Stent Removal;  Surgeon: Stuart King MD;  [facility]     DAVINCI LYMPHADENECTOMY N/A 11/13/2018    Procedure: Davinci Lymphadenectomy ;  Surgeon: Stuart King MD;  [facility]     DAVINCI NEPHROURETERECTOMY N/A 11/13/2018    Procedure: Right DaVinci Assisted Nephroureterectomy;  Surgeon: Stuart King MD;  [facility]     ENDOSCOPIC ULTRASOUND LOWER GASTROINTESTIONAL TRACT (GI) N/A 10/30/2015    Procedure: ENDOSCOPIC ULTRASOUND LOWER GASTROINTESTIONAL TRACT (GI);  Surgeon: Daniel Jean-Baptiste MD;  [facility]     EYE SURGERY  12/4/17     INSERT PORT VASCULAR ACCESS Right 12/19/2018    Procedure: Chest Port Placement  - right;  Surgeon: Stuart Chavez PA-C;  [facility]     IR CHEST PORT PLACEMENT > 5 YRS OF AGE  12/19/2018     LAPAROSCOPIC CHOLECYSTECTOMY WITH CHOLANGIOGRAMS N/A 11/1/2015    Procedure: LAPAROSCOPIC CHOLECYSTECTOMY WITH CHOLANGIOGRAMS;  Surgeon: Tonie Warren MD;  [facility]     SURGICAL HISTORY OF -   1996    malignant melanoma     SURGICAL HISTORY OF -   1968    thyroid nodule     SURGICAL HISTORY OF -       D & C       FAMILY HISTORY:   Family History   Problem Relation Age of Onset     Cancer Father         dec - esophageal and laryngeal     Heart Disease Mother      Respiratory Mother         dec     Breast Cancer Daughter      Other Cancer Daughter      Thyroid Disease Daughter      Asthma Daughter      Hyperlipidemia Son      Diabetes Son        SOCIAL HISTORY:   Social History     Tobacco Use     Smoking status: Never Smoker     Smokeless tobacco: Never Used   Substance Use Topics     Alcohol use: No     Alcohol/week: 0.0 oz       PROBLEM LIST:   Patient Active Problem List    Diagnosis Date Noted     Encounter for antineoplastic chemotherapy 01/09/2019     Priority: Medium     Graves disease 12/04/2018     Priority: Medium     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     Priority: Medium     CRESENCIO (acute kidney injury) (H) 09/11/2018     Priority: Medium     Hydronephrosis 09/11/2018     Priority: Medium     Restless legs syndrome 07/11/2018     Priority: Medium     Hyperthyroidism 06/28/2018     Priority: Medium     Urothelial carcinoma (H) 03/22/2018     Priority: Medium     Chronic systolic heart failure (H) 02/20/2018     Priority: Medium     Anxiety 02/07/2018     Priority: Medium     A-fib (H) 01/16/2018     Priority: Medium     Stress-induced cardiomyopathy 12/14/2017     Priority: Medium     NSTEMI (non-ST elevated myocardial infarction) (H) 12/13/2017     Priority: Medium     Iron deficiency anemia, unspecified iron deficiency anemia type 11/30/2017      Priority: Medium     Obesity, unspecified obesity severity, unspecified obesity type 02/22/2017     Priority: Medium     Chronic bilateral low back pain without sciatica 12/05/2016     Priority: Medium     Impaired fasting blood sugar 11/24/2015     Priority: Medium     Shoulder pain 10/31/2014     Priority: Medium     High risk medication use 10/15/2014     Priority: Medium     Polymyalgia rheumatica (H) 07/10/2014     Priority: Medium     Hip arthritis 06/23/2014     Priority: Medium     Hypertension goal BP (blood pressure) < 140/90 11/30/2011     Priority: Medium     Urinary incontinence 11/30/2011     Priority: Medium     Osteoarthritis of left knee 11/30/2011     Priority: Medium     Hyperlipidemia LDL goal <130 05/09/2010     Priority: Medium     Nontoxic multinodular goiter      Priority: Medium     Malignant melanoma of skin of trunk, except scrotum (H)      Priority: Medium     perirectal cyst 12/16/2005     Priority: Medium     Restless leg syndrome 10/12/2005     Priority: Medium     heat intolerance 10/12/2005     Priority: Medium     Goiter 10/12/2005     Priority: Medium     Problem list name updated by automated process. Provider to review       Disorder of bone and cartilage 10/12/2005     Priority: Medium     Problem list name updated by automated process. Provider to review       Other psoriasis 10/12/2005     Priority: Medium     Esophageal reflux 09/22/2004     Priority: Medium       MEDICATIONS:   Prescription Medications as of 2/14/2019       Rx Number Disp Refills Start End Last Dispensed Date Next Fill Date Owning Pharmacy    acetaminophen (TYLENOL) 650 MG CR tablet  100 tablet 0 11/14/2018    [facility] [place] - [place], [place] - [address]    Sig: Take 1 tablet (650 mg) by mouth every 8 hours as needed for pain    Class: E-Prescribe    Route: Oral    alendronate (FOSAMAX) 70 MG tablet  12 tablet 1 12/29/2018    [facility] - [place], [place] -  "[address]    Sig: TAKE 1 TABLET EVERY 7 DAYS AT LEAST 60 MIN BEFORE BREAKFAST AS DIRECTED \"SEE PACKAGE FOR ADDITIONAL INSTRUCTIONS\"    Class: E-Prescribe    aspirin 81 MG tablet            Sig: Take 81 mg by mouth every evening     Class: Historical    Route: Oral    Calcium Citrate-Vitamin D (CALCIUM + D PO)            Sig: Take 1 tablet by mouth 2 times daily     Class: Historical    Route: Oral    cycloSPORINE (RESTASIS) 0.05 % ophthalmic emulsion            Sig: Place 1 drop into both eyes 2 times daily    Class: Historical    Route: Both Eyes    ferrous sulfate 325 (65 Fe) MG TBEC EC tablet            Sig: Take 325 mg by mouth daily    Class: Historical    Route: Oral    furosemide (LASIX) 20 MG tablet  90 tablet 1 9/19/2018    [facility] - [facility] [address]    Sig: Take 1 tablet (20 mg) by mouth every morning    Class: E-Prescribe    Route: Oral    irbesartan (AVAPRO) 300 MG tablet  90 tablet 3 1/26/2019    [facility] - [facility] [address]    Sig: TAKE 1 TABLET EVERY DAY    Class: E-Prescribe    lidocaine-prilocaine (EMLA) 2.5-2.5 % external cream  30 g 0 1/2/2019 1/2/2020   [phone]    Sig: Apply topically as needed for moderate pain    Class: E-Prescribe    Route: Topical    LORazepam (ATIVAN) 0.5 MG tablet (Ended)  30 tablet 0 1/9/2019 2/13/2019   [phone]    Sig: Take 1 tablet (0.5 mg) by mouth every 6 hours as needed for anxiety, nausea or sleep    Class: Local Print    Route: Oral    lovastatin (MEVACOR) 40 MG tablet  90 tablet 2 6/29/2018    [facility] - [facility] [address]    Sig: TAKE 1 TABLET AT BEDTIME (HYPERLIPIDEMIA LDL GOAL BELOW 130)    Class: E-Prescribe    methimazole (TAPAZOLE) 5 MG tablet  225 tablet 3 1/4/2019    [facility] " Delivery - [facility] [address]    Sig: 10 mg alternating with 15 mg every other day    Class: E-Prescribe    Notes to Pharmacy: Updated order, Per Pharmacy request.    nitroGLYcerin (NITROSTAT) 0.4 MG sublingual tablet  25 tablet 3 12/14/2017    [facility] - [place], [place] - [address]    Sig: For chest pain place 1 tablet under the tongue every 5 minutes for 3 doses. If symptoms persist 5 minutes after 1st dose call 911.    Class: E-Prescribe    Omega-3 Fatty Acids (FISH OIL) 500 MG CAPS            Sig: Take 1 capsule by mouth 2 times daily     Class: Historical    Route: Oral    omeprazole (PRILOSEC) 20 MG DR capsule  90 capsule 0 1/30/2019    [facility] - [address]    Sig: Take 1 capsule (20 mg) by mouth daily    Class: E-Prescribe    Route: Oral    ondansetron (ZOFRAN) 8 MG tablet  30 tablet 5 2/13/2019    [facility] [place], [place] - [address] [facility] [facility] [address]    Sig: Take 1 tablet (8 mg) by mouth every 8 hours as needed (Nausea/Vomiting)    Class: E-Prescribe    Route: Oral    Non-formulary Exception Code: Specific indication for non-formulary alternative    order for DME  1 each 2 8/13/2018        Sig: Equipment being ordered: Knee high compression for B LE 20-30mmHg, Velcro units for night time (consider hybrid sock as foot swelling is less than leg swelling), Donning device    Class: Local Print    polyethylene glycol (MIRALAX/GLYCOLAX) packet    11/2/2015        Sig: Take 17 g by mouth    Class: Historical    Route: Oral    Probiotic Product (PROBIOTIC ADVANCED PO)            Sig: Take 1 capsule by mouth every morning     Class: Historical    Route: Oral    prochlorperazine (COMPAZINE) 10 MG tablet  30 tablet 5 12/12/2018    [id number] - [place], [place] - [address] [place] [address] AVE AT [address]    Sig: Take 0.5 tablets (5 mg) by mouth every 6 hours as needed  (Nausea/Vomiting - take if Zofran doesn't work after 30 mins.)    Class: E-Prescribe    Route: Oral    Non-formulary Exception Code: Specific indication for non-formulary alternative    propranolol ER (INDERAL LA) 60 MG 24 hr capsule  90 capsule 1 2019    [facility] [address] [facility]    Sig: TAKE 1 CAPSULE EVERY DAY    Class: E-Prescribe    rOPINIRole (REQUIP) 0.25 MG tablet  180 tablet 1 2018    [facility] [address] [place] [address] - [address] AT [address]    Si.25 mg in the afternoon and 0.5 mg at night    Class: Local Print    senna-docusate (SENOKOT-S;PERICOLACE) 8.6-50 MG per tablet  60 tablet 0 2018    [place], [place] - [address] [facility]    Sig: Take 1 tablet by mouth 2 times daily To prevent constipation    Class: E-Prescribe    Route: Oral    sertraline (ZOLOFT) 100 MG tablet  90 tablet 1 2018    [facility] - [facility] [address]    Sig: Take 1 tablet (100 mg) by mouth every morning    Class: E-Prescribe    Route: Oral    traZODone (DESYREL) 50 MG tablet  45 tablet 0 2019    [facility] Mail [facility] [address]    Sig: TAKE 1/2 TABLET(25 MG)  AT BEDTIME    Class: E-Prescribe      Clinic-Administered Medications as of 2019       Dose Frequency Start End    0.9% sodium chloride BOLUS 500 mL ONCE 2019    Sig: Inject 500 mLs into the vein once    Class: E-Prescribe    Route: Intravenous    CARBOplatin 300 mg in sodium chloride 0.9 % 305 mL CHEMOTHERAPY 300 mg ONCE 2019    Sig: Inject 300 mg into the vein once    Route: Intravenous    heparin 100 UNIT/ML injection 5 mL 5 mL ONCE 2019    Si mLs by Intracatheter route once    Class: E-Prescribe    Route: Intracatheter    sodium chloride (PF) 0.9% PF flush 10 mL 10 mL ONCE 2019    Sig: 10 mLs by  Intracatheter route once    Class: E-Prescribe    Route: Intracatheter          ALLERGIES:   Codeine     ROS:  11 point review of the systems is negative except as stated in HPI.     Physical Examination:   VITALS:   /67 (BP Location: Left arm, Patient Position: Chair, Cuff Size: Adult Regular)   Pulse 58   SpO2 97%   Constitutional: healthy, alert and no distress  Cardiovascular: negative, PMI normal. No lifts, heaves, or thrills. RRR. No murmurs, clicks gallops or rub  Respiratory: negative, Percussion normal. Good diaphragmatic excursion. Lungs clear     Labs:    BMP RESULTS:  Lab Results   Component Value Date     02/13/2019    POTASSIUM 4.3 02/13/2019    CHLORIDE 102 02/13/2019    CO2 27 02/13/2019    ANIONGAP 7 02/13/2019    GLC 89 02/13/2019    BUN 35 (H) 02/13/2019    CR 1.00 02/13/2019    GFRESTIMATED 51 (L) 02/13/2019    GFRESTBLACK 60 (L) 02/13/2019    SHREYA 8.8 02/13/2019        CBC RESULTS:  Lab Results   Component Value Date    WBC 9.3 02/13/2019    RBC 3.36 (L) 02/13/2019    HGB 9.6 (L) 02/13/2019    HCT 31.7 (L) 02/13/2019    MCV 94 02/13/2019    MCH 28.6 02/13/2019    MCHC 30.3 (L) 02/13/2019    RDW 22.3 (H) 02/13/2019     (L) 02/13/2019       INR/PTT:  Lab Results   Component Value Date    INR 0.97 02/07/2019    PTT 27 02/07/2019        Diagnostic studies: CT from 1/22/2019 demonstrates a fusiform aneurysm of the left renal artery.       Assessment and Plan:   85-year-old female with transitional cell carcinoma of the right kidney status post nephrectomy. She was referred to us for treatment considerations for left renal artery aneurysm.  We explained to her that the risk of rupture with renal artery aneurysms is is not high.  The biggest risk associated with the renal artery aneurysm is emboli to the kidney.  The risk of emboli is also not very high.  Since she does not have any symptoms and the risk of complications associated with the aneurysm is not high, we think there is no  indication for treatment at this point.  We ordered an ultrasound today, to better delineate the anatomy and to see if there is any thrombus in the aneurysm. We explained to her that we will watch the aneurysm with imaging every 6 months or yearly. If the aneurysm increases in size or there is suggestion of thrombus formation we may do intervention.     It was pleasure to see the patient.     Francisco Hamm  Vascular and Interventional Radiology Fellow     I have seen the patient with the fellow and agree with the note.    Eric James MD    CC  Patient Care Team:  Pop Zapien MD as PCP - General (Family Practice)  Pop Zapien MD as PCP - Assigned PCP  Crista Richards, APRN CNP as PCP - Cardiology (Nurse Practitioner)  Vincenzo Tovar MD as MD (Family Medicine - Sports Medicine)  Candelaria Raymundo MD as MD (Gastroenterology)  Shavon Bustamante PA-C as Physician Assistant (Physician Assistant)  Kenna La MD as MD (Urology)  Cristiana Correa, RN as Registered Nurse (Urology)  Pop Zapien MD as Referring Physician (Family Practice)  Kiera Figueroa, ATUL as Registered Nurse (Urology)  Stuart King MD as MD (Urology)  Kiera Figueroa, RN as Registered Nurse (Urology)  Geovanna Fregoso PA as Physician Assistant (Urology)  Albert Ahuja MD as MD (Internal Medicine-Hematology & Oncology)  Rose Estrella, RN as Nurse Coordinator (Oncology)  Omayra Gatica, ATUL as Clinic Care Coordinator (Primary Care - CC)  ALBERT AHUJA

## 2019-02-13 ENCOUNTER — INFUSION THERAPY VISIT (OUTPATIENT)
Dept: ONCOLOGY | Facility: CLINIC | Age: 84
End: 2019-02-13
Attending: INTERNAL MEDICINE
Payer: MEDICARE

## 2019-02-13 VITALS
WEIGHT: 142.9 LBS | BODY MASS INDEX: 30.92 KG/M2 | HEART RATE: 64 BPM | OXYGEN SATURATION: 98 % | RESPIRATION RATE: 18 BRPM | TEMPERATURE: 97.9 F | DIASTOLIC BLOOD PRESSURE: 69 MMHG | SYSTOLIC BLOOD PRESSURE: 144 MMHG

## 2019-02-13 DIAGNOSIS — Z51.11 ENCOUNTER FOR ANTINEOPLASTIC CHEMOTHERAPY: ICD-10-CM

## 2019-02-13 DIAGNOSIS — C68.9 UROTHELIAL CARCINOMA (H): ICD-10-CM

## 2019-02-13 DIAGNOSIS — C66.1 UROTHELIAL CARCINOMA OF RIGHT DISTAL URETER (H): Primary | ICD-10-CM

## 2019-02-13 DIAGNOSIS — C66.1 UROTHELIAL CARCINOMA OF RIGHT DISTAL URETER (H): ICD-10-CM

## 2019-02-13 LAB
ALBUMIN SERPL-MCNC: 3.3 G/DL (ref 3.4–5)
ALP SERPL-CCNC: 124 U/L (ref 40–150)
ALT SERPL W P-5'-P-CCNC: 20 U/L (ref 0–50)
ANION GAP SERPL CALCULATED.3IONS-SCNC: 7 MMOL/L (ref 3–14)
AST SERPL W P-5'-P-CCNC: 19 U/L (ref 0–45)
BASOPHILS # BLD AUTO: 0.1 10E9/L (ref 0–0.2)
BASOPHILS NFR BLD AUTO: 0.6 %
BILIRUB SERPL-MCNC: 0.3 MG/DL (ref 0.2–1.3)
BUN SERPL-MCNC: 35 MG/DL (ref 7–30)
CALCIUM SERPL-MCNC: 8.8 MG/DL (ref 8.5–10.1)
CHLORIDE SERPL-SCNC: 102 MMOL/L (ref 94–109)
CO2 SERPL-SCNC: 27 MMOL/L (ref 20–32)
CREAT SERPL-MCNC: 1 MG/DL (ref 0.52–1.04)
DIFFERENTIAL METHOD BLD: ABNORMAL
EOSINOPHIL # BLD AUTO: 0 10E9/L (ref 0–0.7)
EOSINOPHIL NFR BLD AUTO: 0.2 %
ERYTHROCYTE [DISTWIDTH] IN BLOOD BY AUTOMATED COUNT: 22.3 % (ref 10–15)
GFR SERPL CREATININE-BSD FRML MDRD: 51 ML/MIN/{1.73_M2}
GLUCOSE SERPL-MCNC: 89 MG/DL (ref 70–99)
HCT VFR BLD AUTO: 31.7 % (ref 35–47)
HGB BLD-MCNC: 9.6 G/DL (ref 11.7–15.7)
IMM GRANULOCYTES # BLD: 0.2 10E9/L (ref 0–0.4)
IMM GRANULOCYTES NFR BLD: 1.9 %
LYMPHOCYTES # BLD AUTO: 1.1 10E9/L (ref 0.8–5.3)
LYMPHOCYTES NFR BLD AUTO: 11.9 %
MCH RBC QN AUTO: 28.6 PG (ref 26.5–33)
MCHC RBC AUTO-ENTMCNC: 30.3 G/DL (ref 31.5–36.5)
MCV RBC AUTO: 94 FL (ref 78–100)
MONOCYTES # BLD AUTO: 0.9 10E9/L (ref 0–1.3)
MONOCYTES NFR BLD AUTO: 9.4 %
NEUTROPHILS # BLD AUTO: 7.1 10E9/L (ref 1.6–8.3)
NEUTROPHILS NFR BLD AUTO: 76 %
NRBC # BLD AUTO: 0 10*3/UL
NRBC BLD AUTO-RTO: 0 /100
PLATELET # BLD AUTO: 138 10E9/L (ref 150–450)
POTASSIUM SERPL-SCNC: 4.3 MMOL/L (ref 3.4–5.3)
PROT SERPL-MCNC: 7.3 G/DL (ref 6.8–8.8)
RBC # BLD AUTO: 3.36 10E12/L (ref 3.8–5.2)
SODIUM SERPL-SCNC: 136 MMOL/L (ref 133–144)
WBC # BLD AUTO: 9.3 10E9/L (ref 4–11)

## 2019-02-13 PROCEDURE — 85025 COMPLETE CBC W/AUTO DIFF WBC: CPT | Performed by: INTERNAL MEDICINE

## 2019-02-13 PROCEDURE — 25000128 H RX IP 250 OP 636: Mod: ZF | Performed by: INTERNAL MEDICINE

## 2019-02-13 PROCEDURE — 96417 CHEMO IV INFUS EACH ADDL SEQ: CPT

## 2019-02-13 PROCEDURE — 25800030 ZZH RX IP 258 OP 636: Mod: ZF | Performed by: INTERNAL MEDICINE

## 2019-02-13 PROCEDURE — 96367 TX/PROPH/DG ADDL SEQ IV INF: CPT

## 2019-02-13 PROCEDURE — G0463 HOSPITAL OUTPT CLINIC VISIT: HCPCS | Mod: ZF

## 2019-02-13 PROCEDURE — 96413 CHEMO IV INFUSION 1 HR: CPT

## 2019-02-13 PROCEDURE — 96375 TX/PRO/DX INJ NEW DRUG ADDON: CPT

## 2019-02-13 PROCEDURE — 80053 COMPREHEN METABOLIC PANEL: CPT | Performed by: INTERNAL MEDICINE

## 2019-02-13 PROCEDURE — 99214 OFFICE O/P EST MOD 30 MIN: CPT | Mod: ZP | Performed by: INTERNAL MEDICINE

## 2019-02-13 RX ORDER — DIPHENHYDRAMINE HYDROCHLORIDE 50 MG/ML
50 INJECTION INTRAMUSCULAR; INTRAVENOUS
Status: CANCELLED
Start: 2019-02-20

## 2019-02-13 RX ORDER — ALBUTEROL SULFATE 90 UG/1
1-2 AEROSOL, METERED RESPIRATORY (INHALATION)
Status: CANCELLED
Start: 2019-02-13

## 2019-02-13 RX ORDER — SODIUM CHLORIDE 9 MG/ML
1000 INJECTION, SOLUTION INTRAVENOUS CONTINUOUS PRN
Status: CANCELLED
Start: 2019-02-20

## 2019-02-13 RX ORDER — EPINEPHRINE 1 MG/ML
0.3 INJECTION, SOLUTION INTRAMUSCULAR; SUBCUTANEOUS EVERY 5 MIN PRN
Status: CANCELLED | OUTPATIENT
Start: 2019-02-20

## 2019-02-13 RX ORDER — EPINEPHRINE 0.3 MG/.3ML
0.3 INJECTION SUBCUTANEOUS EVERY 5 MIN PRN
Status: CANCELLED | OUTPATIENT
Start: 2019-02-20

## 2019-02-13 RX ORDER — HEPARIN SODIUM (PORCINE) LOCK FLUSH IV SOLN 100 UNIT/ML 100 UNIT/ML
5 SOLUTION INTRAVENOUS EVERY 8 HOURS PRN
Status: DISCONTINUED | OUTPATIENT
Start: 2019-02-13 | End: 2019-02-13 | Stop reason: HOSPADM

## 2019-02-13 RX ORDER — MEPERIDINE HYDROCHLORIDE 25 MG/ML
25 INJECTION INTRAMUSCULAR; INTRAVENOUS; SUBCUTANEOUS EVERY 30 MIN PRN
Status: CANCELLED | OUTPATIENT
Start: 2019-02-20

## 2019-02-13 RX ORDER — MEPERIDINE HYDROCHLORIDE 25 MG/ML
25 INJECTION INTRAMUSCULAR; INTRAVENOUS; SUBCUTANEOUS EVERY 30 MIN PRN
Status: CANCELLED | OUTPATIENT
Start: 2019-02-13

## 2019-02-13 RX ORDER — EPINEPHRINE 0.3 MG/.3ML
0.3 INJECTION SUBCUTANEOUS EVERY 5 MIN PRN
Status: CANCELLED | OUTPATIENT
Start: 2019-02-13

## 2019-02-13 RX ORDER — ONDANSETRON 8 MG/1
8 TABLET, FILM COATED ORAL EVERY 8 HOURS PRN
Qty: 30 TABLET | Refills: 5 | Status: SHIPPED | OUTPATIENT
Start: 2019-02-13 | End: 2019-09-11

## 2019-02-13 RX ORDER — SODIUM CHLORIDE 9 MG/ML
1000 INJECTION, SOLUTION INTRAVENOUS CONTINUOUS PRN
Status: CANCELLED
Start: 2019-02-13

## 2019-02-13 RX ORDER — ALBUTEROL SULFATE 0.83 MG/ML
2.5 SOLUTION RESPIRATORY (INHALATION)
Status: CANCELLED | OUTPATIENT
Start: 2019-02-13

## 2019-02-13 RX ORDER — PALONOSETRON 0.05 MG/ML
0.25 INJECTION, SOLUTION INTRAVENOUS ONCE
Status: CANCELLED
Start: 2019-02-13 | End: 2019-02-13

## 2019-02-13 RX ORDER — PALONOSETRON 0.05 MG/ML
0.25 INJECTION, SOLUTION INTRAVENOUS ONCE
Status: CANCELLED
Start: 2019-02-20 | End: 2019-02-20

## 2019-02-13 RX ORDER — HEPARIN SODIUM (PORCINE) LOCK FLUSH IV SOLN 100 UNIT/ML 100 UNIT/ML
5 SOLUTION INTRAVENOUS ONCE
Status: CANCELLED
Start: 2019-02-20 | End: 2019-02-20

## 2019-02-13 RX ORDER — EPINEPHRINE 1 MG/ML
0.3 INJECTION, SOLUTION INTRAMUSCULAR; SUBCUTANEOUS EVERY 5 MIN PRN
Status: CANCELLED | OUTPATIENT
Start: 2019-02-13

## 2019-02-13 RX ORDER — DEXAMETHASONE SODIUM PHOSPHATE 4 MG/ML
12 INJECTION, SOLUTION INTRA-ARTICULAR; INTRALESIONAL; INTRAMUSCULAR; INTRAVENOUS; SOFT TISSUE ONCE
Status: DISCONTINUED | OUTPATIENT
Start: 2019-02-13 | End: 2019-02-13

## 2019-02-13 RX ORDER — METHYLPREDNISOLONE SODIUM SUCCINATE 125 MG/2ML
125 INJECTION, POWDER, LYOPHILIZED, FOR SOLUTION INTRAMUSCULAR; INTRAVENOUS
Status: CANCELLED
Start: 2019-02-13

## 2019-02-13 RX ORDER — DEXAMETHASONE SODIUM PHOSPHATE 4 MG/ML
12 INJECTION, SOLUTION INTRA-ARTICULAR; INTRALESIONAL; INTRAMUSCULAR; INTRAVENOUS; SOFT TISSUE ONCE
Status: CANCELLED
Start: 2019-02-13 | End: 2019-02-13

## 2019-02-13 RX ORDER — ALBUTEROL SULFATE 0.83 MG/ML
2.5 SOLUTION RESPIRATORY (INHALATION)
Status: CANCELLED | OUTPATIENT
Start: 2019-02-20

## 2019-02-13 RX ORDER — DIPHENHYDRAMINE HYDROCHLORIDE 50 MG/ML
50 INJECTION INTRAMUSCULAR; INTRAVENOUS
Status: CANCELLED
Start: 2019-02-13

## 2019-02-13 RX ORDER — PALONOSETRON 0.05 MG/ML
0.25 INJECTION, SOLUTION INTRAVENOUS ONCE
Status: COMPLETED | OUTPATIENT
Start: 2019-02-13 | End: 2019-02-13

## 2019-02-13 RX ORDER — METHYLPREDNISOLONE SODIUM SUCCINATE 125 MG/2ML
125 INJECTION, POWDER, LYOPHILIZED, FOR SOLUTION INTRAMUSCULAR; INTRAVENOUS
Status: CANCELLED
Start: 2019-02-20

## 2019-02-13 RX ORDER — HEPARIN SODIUM (PORCINE) LOCK FLUSH IV SOLN 100 UNIT/ML 100 UNIT/ML
5 SOLUTION INTRAVENOUS ONCE
Status: CANCELLED
Start: 2019-02-13 | End: 2019-02-13

## 2019-02-13 RX ORDER — ALBUTEROL SULFATE 90 UG/1
1-2 AEROSOL, METERED RESPIRATORY (INHALATION)
Status: CANCELLED
Start: 2019-02-20

## 2019-02-13 RX ORDER — HEPARIN SODIUM (PORCINE) LOCK FLUSH IV SOLN 100 UNIT/ML 100 UNIT/ML
5 SOLUTION INTRAVENOUS ONCE
Status: COMPLETED | OUTPATIENT
Start: 2019-02-13 | End: 2019-02-13

## 2019-02-13 RX ADMIN — HEPARIN 5 ML: 100 SYRINGE at 12:26

## 2019-02-13 RX ADMIN — SODIUM CHLORIDE 500 ML: 9 INJECTION, SOLUTION INTRAVENOUS at 10:55

## 2019-02-13 RX ADMIN — CARBOPLATIN 300 MG: 10 INJECTION, SOLUTION INTRAVENOUS at 11:55

## 2019-02-13 RX ADMIN — DEXAMETHASONE SODIUM PHOSPHATE 150 MG: 10 INJECTION, SOLUTION INTRAMUSCULAR; INTRAVENOUS at 10:55

## 2019-02-13 RX ADMIN — PALONOSETRON HYDROCHLORIDE 0.25 MG: 0.25 INJECTION INTRAVENOUS at 10:55

## 2019-02-13 RX ADMIN — HEPARIN SODIUM (PORCINE) LOCK FLUSH IV SOLN 100 UNIT/ML 5 ML: 100 SOLUTION at 08:51

## 2019-02-13 RX ADMIN — GEMCITABINE 1600 MG: 38 INJECTION, SOLUTION INTRAVENOUS at 11:21

## 2019-02-13 ASSESSMENT — PAIN SCALES - GENERAL: PAINLEVEL: NO PAIN (0)

## 2019-02-13 NOTE — PROGRESS NOTES
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    HISTORY OF PRESENT ILLNESS:  Ms. 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 Family accompanies her for the visit.    Ms. Oviedo had two [facility] ER visits since last seen - first on 2/3/19 for palpations and SOA - workup showed Hb of 7.8 with no evidence of ACS; EKG with NSR and PAC; given 1 U PRBC and discharged home. No recurrence or new CV symptoms since. Second ER visit was on 2/7/19 for incessant epistaxis and workup showed WBC 26.3K, Hb 9.3 and plt 55K. Bleeding stopped with topical epinephrine and she didn't need any procedures. ASA was stopped prior to discharge and has had no recurrence of symptoms since.    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    Clinical condition has now sufficiently improved to where she wants to proceed with cycle 4 of chemo today. She does have significant nausea and fatigue with each cycle of chemo. Nausea is better with premedications given with cycle 2. No signs/symptoms of locoregional or distant metastatic disease.    ONCOLOGIC HISTORY:  1. 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     Jan 2018 to September 2018- Persistent gross hematuria and underwent multiple procedures.      2/19/18 and 4/3/18- cystoscopies with bladder biopsies  which were negative for bladder tumor    1/17/18, 3/8/18, 7/26/18, 9/10/18- Multiple urine cytologies have come back positive by FISH for high-grade transitional cell carcinoma    9/10/18- Underwent a bilateral cystoureteroscopy with biopsy and brushing that showed  right upper  "tract cytology suspicious for high-grade urothelial ca. Right ureter brushing negative from that day. Left upper tract negative.    9/10/18- CT A/P without contrast showed new small bilateral pleural effusions and interstitial pulmonary edema but no evidence of locoregional or metastatic disease.    9/12/18- Presented to ED with oliguria, CRESENCIO, and mild hydronephrosis bilaterally on CT scan and underwent bilateral ureteral stent placment    11/13/2018- Right robotic nephroureterectomy, excision of ana-caval mass and LN excision by Stuart King. Pathology showed \"Right nephroureterectomy: Invasive high grade urothelial carcinoma measuring 3.5 cm, located in renal pelvis and invading through renal parenchyma into perinephric fat. Urothelial carcinoma in-situ present. Margins free of tumor. Ana-caval mass: Metastatic urothelial carcinoma involving one lymph node with at least 1 cm tumor deposit. Pericaval Extranodal Extension present. Five additional benign pericaval lymph nodes. Pathologic stage: pT4N1 (1 of 6 lymph nodes).\"    12/11/18- PET/CT whole body demonstrated significant residual FDG avid disease. For example, \"there is an FDG avid ill-defined pericaval mass immediately medial to the surgical clips measuring approximately 2.0 x 1.4 cm, with max SUV measuring up to12.2, see series 4 image 21. Additional FDG avid retrocaval and interaortocaval lymphadenopathy are noted.There is a 1.2 cm nodule in the right hemipelvis posterior lateral tothe bladder with max SUV measuring 8.3, see series 4 image 77. The bladder is incompletely distended.\" No suspicious thoracic or bone uptake.    12/12/18- Started adjuvant chemotherapy (carbo+gem) per POUT trial. Cycle 2 - 1/2/19. Cycle 3 - 1/23/19. Cycle 4 - 02/13/19.    REVIEW OF SYSTEMS: 14 point ROS negative other than the symptoms noted above in the HPI.    PAST MEDICAL AND SURGICAL HISTORY:   Active Ambulatory Problems     Diagnosis Date Noted     Esophageal reflux " 09/22/2004     Restless leg syndrome 10/12/2005     heat intolerance 10/12/2005     Goiter 10/12/2005     Disorder of bone and cartilage 10/12/2005     Other psoriasis 10/12/2005     perirectal cyst 12/16/2005     Malignant melanoma of skin of trunk, except scrotum (H)      Nontoxic multinodular goiter      Hyperlipidemia LDL goal <130 05/09/2010     Hypertension goal BP (blood pressure) < 140/90 11/30/2011     Urinary incontinence 11/30/2011     Osteoarthritis of left knee 11/30/2011     Hip arthritis 06/23/2014     Polymyalgia rheumatica (H) 07/10/2014     High risk medication use 10/15/2014     Shoulder pain 10/31/2014     Impaired fasting blood sugar 11/24/2015     Chronic bilateral low back pain without sciatica 12/05/2016     Obesity, unspecified obesity severity, unspecified obesity type 02/22/2017     Iron deficiency anemia, unspecified iron deficiency anemia type 11/30/2017     NSTEMI (non-ST elevated myocardial infarction) (H) 12/13/2017     Stress-induced cardiomyopathy 12/14/2017     A-fib (H) 01/16/2018     Anxiety 02/07/2018     Chronic systolic heart failure (H) 02/20/2018     Urothelial carcinoma (H) 03/22/2018     Hyperthyroidism 06/28/2018     Restless legs syndrome 07/11/2018     CRESENCIO (acute kidney injury) (H) 09/11/2018     Hydronephrosis 09/11/2018     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     Graves disease 12/04/2018     Encounter for antineoplastic chemotherapy 01/09/2019     Resolved Ambulatory Problems     Diagnosis Date Noted     Undiagnosed cardiac murmurs      Knee pain 01/09/2013     Disorder of bursae and tendons in shoulder region 09/30/2014     Sepsis (H) 10/30/2015     Shoulder joint pain, unspecified laterality 03/30/2016     Injury of right rotator cuff 03/30/2016     Atrial fibrillation, unspecified type (H) 01/25/2018     (HFpEF) heart failure with preserved ejection fraction (H) 07/06/2018     Past Medical History:   Diagnosis Date     Calculus of kidney   "    Esophageal reflux      GERD (gastroesophageal reflux disease)      Hyperlipidemia LDL goal <130 5/9/2010     Malignant melanoma of skin of trunk, except scrotum (H)      Nonspecific abnormal finding      Nontoxic multinodular goiter      Osteopenia      Other psoriasis      Personal history of colonic polyps      PMR (polymyalgia rheumatica) (H)      Stress-induced cardiomyopathy      Undiagnosed cardiac murmurs      Unspecified constipation      Unspecified essential hypertension      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     SOCIAL HISTORY:   Social History     Tobacco Use     Smoking status: Never Smoker     Smokeless tobacco: Never Used   Substance Use Topics     Alcohol use: No     Alcohol/week: 0.0 oz     Drug use: No     FAMILY HISTORY:   Family History   Problem Relation Age of Onset     Cancer Father         dec - esophageal and laryngeal     Heart Disease Mother      Respiratory Mother         dec     Breast Cancer Daughter      Other Cancer Daughter      Thyroid Disease Daughter      Asthma Daughter      Hyperlipidemia Son      Diabetes Son      ALLERGIES:   Allergies   Allergen Reactions     Codeine      CURRENT MEDICATIONS:   Current Outpatient Medications:      acetaminophen (TYLENOL) 650 MG CR tablet, Take 1 tablet (650 mg) by mouth every 8 hours as needed for pain, Disp: 100 tablet, Rfl: 0     alendronate (FOSAMAX) 70 MG tablet, TAKE 1 TABLET EVERY 7 DAYS AT LEAST 60 MIN BEFORE BREAKFAST AS DIRECTED \"SEE PACKAGE FOR ADDITIONAL INSTRUCTIONS\", Disp: 12 tablet, Rfl: 1     Calcium Citrate-Vitamin D (CALCIUM + D PO), Take 1 tablet by mouth 2 times daily , Disp: , Rfl:      cycloSPORINE (RESTASIS) 0.05 % ophthalmic emulsion, Place 1 drop into both eyes 2 times daily, Disp: , Rfl:      ferrous sulfate 325 (65 Fe) MG TBEC EC tablet, Take 325 mg by mouth daily, Disp: , Rfl:      furosemide (LASIX) 20 MG tablet, Take 1 tablet (20 mg) by mouth every morning, Disp: 90 tablet, Rfl: 1     irbesartan (AVAPRO) 300 " MG tablet, TAKE 1 TABLET EVERY DAY, Disp: 90 tablet, Rfl: 3     lidocaine-prilocaine (EMLA) 2.5-2.5 % external cream, Apply topically as needed for moderate pain, Disp: 30 g, Rfl: 0     LORazepam (ATIVAN) 0.5 MG tablet, Take 1 tablet (0.5 mg) by mouth every 6 hours as needed for anxiety, nausea or sleep, Disp: 30 tablet, Rfl: 0     lovastatin (MEVACOR) 40 MG tablet, TAKE 1 TABLET AT BEDTIME (HYPERLIPIDEMIA LDL GOAL BELOW 130), Disp: 90 tablet, Rfl: 2     methimazole (TAPAZOLE) 5 MG tablet, 10 mg alternating with 15 mg every other day, Disp: 225 tablet, Rfl: 3     Omega-3 Fatty Acids (FISH OIL) 500 MG CAPS, Take 1 capsule by mouth 2 times daily , Disp: , Rfl:      omeprazole (PRILOSEC) 20 MG DR capsule, Take 1 capsule (20 mg) by mouth daily, Disp: 90 capsule, Rfl: 0     ondansetron (ZOFRAN) 8 MG tablet, Take 1 tablet (8 mg) by mouth every 8 hours as needed (Nausea/Vomiting), Disp: 30 tablet, Rfl: 5     order for DME, Equipment being ordered: Knee high compression for B LE 20-30mmHg, Velcro units for night time (consider hybrid sock as foot swelling is less than leg swelling), Donning device, Disp: 1 each, Rfl: 2     polyethylene glycol (MIRALAX/GLYCOLAX) packet, Take 17 g by mouth, Disp: , Rfl:      Probiotic Product (PROBIOTIC ADVANCED PO), Take 1 capsule by mouth every morning , Disp: , Rfl:      propranolol ER (INDERAL LA) 60 MG 24 hr capsule, TAKE 1 CAPSULE EVERY DAY, Disp: 90 capsule, Rfl: 1     rOPINIRole (REQUIP) 0.25 MG tablet, 0.25 mg in the afternoon and 0.5 mg at night, Disp: 180 tablet, Rfl: 1     senna-docusate (SENOKOT-S;PERICOLACE) 8.6-50 MG per tablet, Take 1 tablet by mouth 2 times daily To prevent constipation, Disp: 60 tablet, Rfl: 0     sertraline (ZOLOFT) 100 MG tablet, Take 1 tablet (100 mg) by mouth every morning, Disp: 90 tablet, Rfl: 1     traZODone (DESYREL) 50 MG tablet, TAKE 1/2 TABLET(25 MG)  AT BEDTIME, Disp: 45 tablet, Rfl: 0     aspirin 81 MG tablet, Take 81 mg by mouth every evening ,  Disp: , Rfl:      nitroGLYcerin (NITROSTAT) 0.4 MG sublingual tablet, For chest pain place 1 tablet under the tongue every 5 minutes for 3 doses. If symptoms persist 5 minutes after 1st dose call 911. (Patient not taking: Reported on 2/13/2019), Disp: 25 tablet, Rfl: 3     prochlorperazine (COMPAZINE) 10 MG tablet, Take 0.5 tablets (5 mg) by mouth every 6 hours as needed (Nausea/Vomiting - take if Zofran doesn't work after 30 mins.) (Patient not taking: Reported on 2/13/2019), Disp: 30 tablet, Rfl: 5    Current Facility-Administered Medications:      heparin 100 UNIT/ML injection 5 mL, 5 mL, Intracatheter, Q8H PRN, Jaden Toledo MD, 5 mL at 02/13/19 0851     sodium chloride (PF) 0.9% PF flush 20 mL, 20 mL, Intracatheter, Q1H PRN, Jaden Toledo MD, 20 mL at 02/13/19 0851    PHYSICAL EXAMINATION:  Vital signs: /69 (BP Location: Right arm, Patient Position: Sitting, Cuff Size: Adult Regular)   Pulse 64   Temp 97.9  F (36.6  C) (Oral)   Resp 18   Wt 64.8 kg (142 lb 14.4 oz)   SpO2 98%   BMI 30.92 kg/m    ECOG performance status of 2. Living independently at home.  GENERAL: Well-nourished healthy-appearing sitting in chair, no acute distress.   HEENT: No icterus, no pallor. Moist mucous membranes. Oropharynx is clear.   NECK: Supple, no JVD/LAD.  LUNGS: Clear to ausculation bilaterally, normal work of breathing.   CARDIOVASCULAR: Regular rate and rhythm, no murmurs, gallops or rubs.   ABDOMEN: Soft, nontender and nondistended, no palpable masses, bowel sounds present.  EXTREMITIES: No cyanosis, no clubbing, b/l LE edema improved.  NEUROLOGIC: No focal deficits, CN 2-12 intact.  LYMPH NODE EXAM: No palpable adenopathy - cervical, axillary or inguinal.     LABORATORY DATA:  Lab Test 02/13/19  0901 02/11/19  1056 02/07/19 2009   WBC 9.3 15.6* 26.3*   RBC 3.36* 3.49* 3.13*   HGB 9.6* 10.1* 9.3*   HCT 31.7* 32.6* 28.8*   MCV 94 93 92   MCH 28.6 28.9 29.7   MCHC 30.3* 31.0* 32.3   RDW 22.3* 21.1* 19.4*   * 98*  "55*   NEUTROPHIL 76.0 79.2 93.5    134 137   POTASSIUM 4.3 4.8 4.0   CHLORIDE 102 99 99   CO2 27 29 29   ANIONGAP 7 6 9   GLC 89 117* 94   BUN 35* 31* 25   CR 1.00 1.18* 0.98   SHREYA 8.8 9.6 9.3   PROTTOTAL 7.3 7.7 7.0   ALBUMIN 3.3* 3.7 3.5   BILITOTAL 0.3 0.3 0.3   ALKPHOS 124 144 178*   AST 19 23 28   ALT 20 24 26     Lab Test 02/03/19  2102 01/02/19  0725 12/19/18  0930 12/04/18  1247 11/26/18  1321   TSH 0.01* <0.01* <0.01* <0.01* <0.01*   T4 1.11 1.26 0.90 1.05 1.16     IMAGING STUDIES:  1. CT C/A/P with contrast 1/22/19 compared with prior PET/CT: \"1. New well-circumscribed soft tissue pulmonary nodules of the right lower lobe and left upper lobe. Findings could be infectious, inflammatory, or represent metastatic disease. Continued attention on follow-up recommended. Postoperative changes of right nephrectomy. No evidence for local recurrence in the present study. 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  3. Nonobstructing 3 mm stone of the left kidney.  4. Persistent common bile duct ectasia, likely physiologic in the setting of direct cholecystectomy. 5. Multinodular goiter. \"  2. PET/CT 12/11/18: \"There is a seroma in the surgical fossa. FDG avid disease in the retroperitoneum adjacent to surgical clips and retrocaval andinteraortocaval lymphadenopathy. FDG avid focus right posterolateral to urinary bladder might represent another node.\"    ASSESSMENT AND PLAN: Ms. 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     1. Upper tract urothelial cancer:   - No clinical evidence of disease progression at this time. She did have some concerning CT and PET/CT findings previously and will need reimaging studies soon.  - Continue carboplatin 5AUC IV on day 1 with gemcitabine 1000mg/m2 IV on days 1 and 8 of each 3 week " cycle for 4 cycles total as she is unlikely to tolerate >4 cycles.   - Aware of side effects of carboplatin and gemcitabine chemotherapy at length including fatigue, nausea, vomiting, diarrhea, myelosuppression, nephropathy, neuropathy, and other side effects including second malignacies etc.    - Continue pre-meds on day 1 and Neulasta on-pro on day 8.  - Asked to avoid Ativan due to age and risk of delirium.  - Day 8 and 15 labs, hydration and PRN transfusions as under.  - Restaging CT C/A/P in 3 weeks.     2. Renal artery saccular aneursym:  - Management per IR team.   3. Chemo induced anemia and thrombocytopenia:  - Labs on days 1, 8 and 15 of this cycle with PRN blood products to keep Hb > 8 and platelets > 50K IF she has major bleeding.   - Advised to continue holding aspirin 81mg for at least next 2 weeks and resume after platelets recover > 75K.   4. History of afib, recent episode of palpitations:  - Will arrange for labs, hydration and PRN blood transfusions to maintain Hb > 8 to decrease risk of cardiac adverse events.  - Pt encouraged to keep cardiology follow-ups.   Return to see me in 3 weeks.   All of the above was explained to the patient in lay language and several questions were answered. They are in agreement with the plan.  BILLIN.  Jaden Toledo M.D.  . Professor of Medicine  Genitourinary Oncology  Division of Hematology, Oncology & Transplantation  [facility]

## 2019-02-13 NOTE — PROGRESS NOTES
SPIRITUAL HEALTH SERVICES  SPIRITUAL ASSESSMENT Progress Note  [facility]     REASON FOR ENCOUNTER: Follow-up       Reviewed documentation and had supportive visit with Dimple and her daughter-in-law Day which incorporated elements of illness and family narratives.    Dimple was sewing / cross-stitching a blanket for her great-granddaughter (expected this summer) with Pio / Karyna Mouse images, in pink.    She reflected on her visit with her daughter Nidia, who I met last time and who was visiting from [place], and said that had a wonderful time together. She is looking forward to her returning for another visit in the near future.    Cullen and Day reflected on the excitement around the great-granddaugther expected this summer, as she will be the first girl baby born into the family in a long time.    Dimple understands that her last chemo treatment will be next week and she is looking forward to that transition. We discussed the possibility of special prayers to philip that transition, which Dimple welcomed. I informed  Carmen Pearson who will be here next Wednesday of this discussion.    I shared my gratitude with Dimple for the opportunity to hear her reflections during our several visits and for her willingness to share her life experinces and memories. Dimple has shared repeatedly about the blessings in her life, which are primarily centered in her experience of family.    Christoph Kauffman MDiv  Chaplain Resident  Pager [phone]  Cell [phone]

## 2019-02-13 NOTE — NURSING NOTE
Chief Complaint   Patient presents with     Port Draw     Labs drawn via port by RN in lab. Line flushed and hep locked.     Yolanda Don RN

## 2019-02-13 NOTE — PATIENT INSTRUCTIONS
Dr Toledo sent this request to scheduling, you may not be scheduled on these specific dates:    1. Cycle 4 day 1 of chemo today.  2. Cycle 4 day 8 of chemo and PRN transfusion on 2/20/19 - schedule for afternoon.   3. Cycle 4 day 15 - labs, hydration and PRN transfusion appt - 2/27/19 - schedule for afternoon.   4. CT C/A/P in 2.5 weeks.  5. Return to see me on 3/6 with labs and for next cycle of chemo.      Contact Numbers  [facility]: [phone]    After Hours:  [phone]  Triage: [phone]    Please call the [facility] Triage line if you experience a temperature greater than or equal to 100.5, shaking chills, have uncontrolled nausea, vomiting and/or diarrhea, dizziness, shortness of breath, chest pain, bleeding, unexplained bruising, or if you have any other new/concerning symptoms, questions or concerns.     If it is after hours, weekends, or holidays, please call the main hospital  at  [phone] and ask to speak to the Oncology doctor on call.     If you are having any concerning symptoms or wish to speak to a provider before your next infusion visit, please call your care coordinator or triage to notify them so we can adequately serve you.     If you need a refill on a narcotic prescription or other medication, please call triage before your infusion appointment.

## 2019-02-13 NOTE — LETTER
2/13/2019       RE: Dimple Oviedo  [address]  [facility] [address]     Dear Colleague,    Thank you for referring your patient, Dimple Oviedo, to the [facility]. Please see a copy of my visit note below.    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    HISTORY OF PRESENT ILLNESS:  Ms. 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 Family accompanies her for the visit.    Ms. Oviedo had two [facility] ER visits since last seen - first on 2/3/19 for palpations and SOA - workup showed Hb of 7.8 with no evidence of ACS; EKG with NSR and PAC; given 1 U PRBC and discharged home. No recurrence or new CV symptoms since. Second ER visit was on 2/7/19 for incessant epistaxis and workup showed WBC 26.3K, Hb 9.3 and plt 55K. Bleeding stopped with topical epinephrine and she didn't need any procedures. ASA was stopped prior to discharge and has had no recurrence of symptoms since.    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    Clinical condition has now sufficiently improved to where she wants to proceed with cycle 4 of chemo today. She does have significant nausea and fatigue with each cycle of chemo. Nausea is better with premedications given with cycle 2. No signs/symptoms of locoregional or distant metastatic disease.    ONCOLOGIC HISTORY:  1. 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     Jan 2018 to September 2018- Persistent gross hematuria and underwent multiple procedures.      2/19/18 and 4/3/18- cystoscopies with bladder biopsies  which were negative for  "bladder tumor    1/17/18, 3/8/18, 7/26/18, 9/10/18- Multiple urine cytologies have come back positive by FISH for high-grade transitional cell carcinoma    9/10/18- Underwent a bilateral cystoureteroscopy with biopsy and brushing that showed  right upper tract cytology suspicious for high-grade urothelial ca. Right ureter brushing negative from that day. Left upper tract negative.    9/10/18- CT A/P without contrast showed new small bilateral pleural effusions and interstitial pulmonary edema but no evidence of locoregional or metastatic disease.    9/12/18- Presented to ED with oliguria, CRESENCIO, and mild hydronephrosis bilaterally on CT scan and underwent bilateral ureteral stent placment    11/13/2018- Right robotic nephroureterectomy, excision of ana-caval mass and LN excision by Stuart King. Pathology showed \"Right nephroureterectomy: Invasive high grade urothelial carcinoma measuring 3.5 cm, located in renal pelvis and invading through renal parenchyma into perinephric fat. Urothelial carcinoma in-situ present. Margins free of tumor. Ana-caval mass: Metastatic urothelial carcinoma involving one lymph node with at least 1 cm tumor deposit. Pericaval Extranodal Extension present. Five additional benign pericaval lymph nodes. Pathologic stage: pT4N1 (1 of 6 lymph nodes).\"    12/11/18- PET/CT whole body demonstrated significant residual FDG avid disease. For example, \"there is an FDG avid ill-defined pericaval mass immediately medial to the surgical clips measuring approximately 2.0 x 1.4 cm, with max SUV measuring up to12.2, see series 4 image 21. Additional FDG avid retrocaval and interaortocaval lymphadenopathy are noted.There is a 1.2 cm nodule in the right hemipelvis posterior lateral tothe bladder with max SUV measuring 8.3, see series 4 image 77. The bladder is incompletely distended.\" No suspicious thoracic or bone uptake.    12/12/18- Started adjuvant chemotherapy (carbo+gem) per POUT trial. Cycle 2 " - 1/2/19. Cycle 3 - 1/23/19. Cycle 4 - 02/13/19.    REVIEW OF SYSTEMS: 14 point ROS negative other than the symptoms noted above in the HPI.    PAST MEDICAL AND SURGICAL HISTORY:   Active Ambulatory Problems     Diagnosis Date Noted     Esophageal reflux 09/22/2004     Restless leg syndrome 10/12/2005     heat intolerance 10/12/2005     Goiter 10/12/2005     Disorder of bone and cartilage 10/12/2005     Other psoriasis 10/12/2005     perirectal cyst 12/16/2005     Malignant melanoma of skin of trunk, except scrotum (H)      Nontoxic multinodular goiter      Hyperlipidemia LDL goal <130 05/09/2010     Hypertension goal BP (blood pressure) < 140/90 11/30/2011     Urinary incontinence 11/30/2011     Osteoarthritis of left knee 11/30/2011     Hip arthritis 06/23/2014     Polymyalgia rheumatica (H) 07/10/2014     High risk medication use 10/15/2014     Shoulder pain 10/31/2014     Impaired fasting blood sugar 11/24/2015     Chronic bilateral low back pain without sciatica 12/05/2016     Obesity, unspecified obesity severity, unspecified obesity type 02/22/2017     Iron deficiency anemia, unspecified iron deficiency anemia type 11/30/2017     NSTEMI (non-ST elevated myocardial infarction) (H) 12/13/2017     Stress-induced cardiomyopathy 12/14/2017     A-fib (H) 01/16/2018     Anxiety 02/07/2018     Chronic systolic heart failure (H) 02/20/2018     Urothelial carcinoma (H) 03/22/2018     Hyperthyroidism 06/28/2018     Restless legs syndrome 07/11/2018     CRESENCIO (acute kidney injury) (H) 09/11/2018     Hydronephrosis 09/11/2018     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     Graves disease 12/04/2018     Encounter for antineoplastic chemotherapy 01/09/2019     Resolved Ambulatory Problems     Diagnosis Date Noted     Undiagnosed cardiac murmurs      Knee pain 01/09/2013     Disorder of bursae and tendons in shoulder region 09/30/2014     Sepsis (H) 10/30/2015     Shoulder joint pain, unspecified laterality  "03/30/2016     Injury of right rotator cuff 03/30/2016     Atrial fibrillation, unspecified type (H) 01/25/2018     (HFpEF) heart failure with preserved ejection fraction (H) 07/06/2018     Past Medical History:   Diagnosis Date     Calculus of kidney      Esophageal reflux      GERD (gastroesophageal reflux disease)      Hyperlipidemia LDL goal <130 5/9/2010     Malignant melanoma of skin of trunk, except scrotum (H)      Nonspecific abnormal finding      Nontoxic multinodular goiter      Osteopenia      Other psoriasis      Personal history of colonic polyps      PMR (polymyalgia rheumatica) (H)      Stress-induced cardiomyopathy      Undiagnosed cardiac murmurs      Unspecified constipation      Unspecified essential hypertension      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     SOCIAL HISTORY:   Social History     Tobacco Use     Smoking status: Never Smoker     Smokeless tobacco: Never Used   Substance Use Topics     Alcohol use: No     Alcohol/week: 0.0 oz     Drug use: No     FAMILY HISTORY:   Family History   Problem Relation Age of Onset     Cancer Father         dec - esophageal and laryngeal     Heart Disease Mother      Respiratory Mother         dec     Breast Cancer Daughter      Other Cancer Daughter      Thyroid Disease Daughter      Asthma Daughter      Hyperlipidemia Son      Diabetes Son      ALLERGIES:   Allergies   Allergen Reactions     Codeine      CURRENT MEDICATIONS:   Current Outpatient Medications:      acetaminophen (TYLENOL) 650 MG CR tablet, Take 1 tablet (650 mg) by mouth every 8 hours as needed for pain, Disp: 100 tablet, Rfl: 0     alendronate (FOSAMAX) 70 MG tablet, TAKE 1 TABLET EVERY 7 DAYS AT LEAST 60 MIN BEFORE BREAKFAST AS DIRECTED \"SEE PACKAGE FOR ADDITIONAL INSTRUCTIONS\", Disp: 12 tablet, Rfl: 1     Calcium Citrate-Vitamin D (CALCIUM + D PO), Take 1 tablet by mouth 2 times daily , Disp: , Rfl:      cycloSPORINE (RESTASIS) 0.05 % ophthalmic emulsion, Place 1 drop into both eyes 2 " times daily, Disp: , Rfl:      ferrous sulfate 325 (65 Fe) MG TBEC EC tablet, Take 325 mg by mouth daily, Disp: , Rfl:      furosemide (LASIX) 20 MG tablet, Take 1 tablet (20 mg) by mouth every morning, Disp: 90 tablet, Rfl: 1     irbesartan (AVAPRO) 300 MG tablet, TAKE 1 TABLET EVERY DAY, Disp: 90 tablet, Rfl: 3     lidocaine-prilocaine (EMLA) 2.5-2.5 % external cream, Apply topically as needed for moderate pain, Disp: 30 g, Rfl: 0     LORazepam (ATIVAN) 0.5 MG tablet, Take 1 tablet (0.5 mg) by mouth every 6 hours as needed for anxiety, nausea or sleep, Disp: 30 tablet, Rfl: 0     lovastatin (MEVACOR) 40 MG tablet, TAKE 1 TABLET AT BEDTIME (HYPERLIPIDEMIA LDL GOAL BELOW 130), Disp: 90 tablet, Rfl: 2     methimazole (TAPAZOLE) 5 MG tablet, 10 mg alternating with 15 mg every other day, Disp: 225 tablet, Rfl: 3     Omega-3 Fatty Acids (FISH OIL) 500 MG CAPS, Take 1 capsule by mouth 2 times daily , Disp: , Rfl:      omeprazole (PRILOSEC) 20 MG DR capsule, Take 1 capsule (20 mg) by mouth daily, Disp: 90 capsule, Rfl: 0     ondansetron (ZOFRAN) 8 MG tablet, Take 1 tablet (8 mg) by mouth every 8 hours as needed (Nausea/Vomiting), Disp: 30 tablet, Rfl: 5     order for DME, Equipment being ordered: Knee high compression for B LE 20-30mmHg, Velcro units for night time (consider hybrid sock as foot swelling is less than leg swelling), Donning device, Disp: 1 each, Rfl: 2     polyethylene glycol (MIRALAX/GLYCOLAX) packet, Take 17 g by mouth, Disp: , Rfl:      Probiotic Product (PROBIOTIC ADVANCED PO), Take 1 capsule by mouth every morning , Disp: , Rfl:      propranolol ER (INDERAL LA) 60 MG 24 hr capsule, TAKE 1 CAPSULE EVERY DAY, Disp: 90 capsule, Rfl: 1     rOPINIRole (REQUIP) 0.25 MG tablet, 0.25 mg in the afternoon and 0.5 mg at night, Disp: 180 tablet, Rfl: 1     senna-docusate (SENOKOT-S;PERICOLACE) 8.6-50 MG per tablet, Take 1 tablet by mouth 2 times daily To prevent constipation, Disp: 60 tablet, Rfl: 0     sertraline  (ZOLOFT) 100 MG tablet, Take 1 tablet (100 mg) by mouth every morning, Disp: 90 tablet, Rfl: 1     traZODone (DESYREL) 50 MG tablet, TAKE 1/2 TABLET(25 MG)  AT BEDTIME, Disp: 45 tablet, Rfl: 0     aspirin 81 MG tablet, Take 81 mg by mouth every evening , Disp: , Rfl:      nitroGLYcerin (NITROSTAT) 0.4 MG sublingual tablet, For chest pain place 1 tablet under the tongue every 5 minutes for 3 doses. If symptoms persist 5 minutes after 1st dose call 911. (Patient not taking: Reported on 2/13/2019), Disp: 25 tablet, Rfl: 3     prochlorperazine (COMPAZINE) 10 MG tablet, Take 0.5 tablets (5 mg) by mouth every 6 hours as needed (Nausea/Vomiting - take if Zofran doesn't work after 30 mins.) (Patient not taking: Reported on 2/13/2019), Disp: 30 tablet, Rfl: 5    Current Facility-Administered Medications:      heparin 100 UNIT/ML injection 5 mL, 5 mL, Intracatheter, Q8H PRN, Jaden Toledo MD, 5 mL at 02/13/19 0851     sodium chloride (PF) 0.9% PF flush 20 mL, 20 mL, Intracatheter, Q1H PRN, Jaden Toledo MD, 20 mL at 02/13/19 0851    PHYSICAL EXAMINATION:  Vital signs: /69 (BP Location: Right arm, Patient Position: Sitting, Cuff Size: Adult Regular)   Pulse 64   Temp 97.9  F (36.6  C) (Oral)   Resp 18   Wt 64.8 kg (142 lb 14.4 oz)   SpO2 98%   BMI 30.92 kg/m     ECOG performance status of 2. Living independently at home.  GENERAL: Well-nourished healthy-appearing sitting in chair, no acute distress.   HEENT: No icterus, no pallor. Moist mucous membranes. Oropharynx is clear.   NECK: Supple, no JVD/LAD.  LUNGS: Clear to ausculation bilaterally, normal work of breathing.   CARDIOVASCULAR: Regular rate and rhythm, no murmurs, gallops or rubs.   ABDOMEN: Soft, nontender and nondistended, no palpable masses, bowel sounds present.  EXTREMITIES: No cyanosis, no clubbing, b/l LE edema improved.  NEUROLOGIC: No focal deficits, CN 2-12 intact.  LYMPH NODE EXAM: No palpable adenopathy - cervical, axillary or inguinal.  "    LABORATORY DATA:  Lab Test 02/13/19  0901 02/11/19  1056 02/07/19 2009   WBC 9.3 15.6* 26.3*   RBC 3.36* 3.49* 3.13*   HGB 9.6* 10.1* 9.3*   HCT 31.7* 32.6* 28.8*   MCV 94 93 92   MCH 28.6 28.9 29.7   MCHC 30.3* 31.0* 32.3   RDW 22.3* 21.1* 19.4*   * 98* 55*   NEUTROPHIL 76.0 79.2 93.5    134 137   POTASSIUM 4.3 4.8 4.0   CHLORIDE 102 99 99   CO2 27 29 29   ANIONGAP 7 6 9   GLC 89 117* 94   BUN 35* 31* 25   CR 1.00 1.18* 0.98   SHREYA 8.8 9.6 9.3   PROTTOTAL 7.3 7.7 7.0   ALBUMIN 3.3* 3.7 3.5   BILITOTAL 0.3 0.3 0.3   ALKPHOS 124 144 178*   AST 19 23 28   ALT 20 24 26     Lab Test 02/03/19  2102 01/02/19  0725 12/19/18  0930 12/04/18  1247 11/26/18  1321   TSH 0.01* <0.01* <0.01* <0.01* <0.01*   T4 1.11 1.26 0.90 1.05 1.16     IMAGING STUDIES:  1. CT C/A/P with contrast 1/22/19 compared with prior PET/CT: \"1. New well-circumscribed soft tissue pulmonary nodules of the right lower lobe and left upper lobe. Findings could be infectious, inflammatory, or represent metastatic disease. Continued attention on follow-up recommended. Postoperative changes of right nephrectomy. No evidence for local recurrence in the present study. 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  3. Nonobstructing 3 mm stone of the left kidney.  4. Persistent common bile duct ectasia, likely physiologic in the setting of direct cholecystectomy. 5. Multinodular goiter. \"  2. PET/CT 12/11/18: \"There is a seroma in the surgical fossa. FDG avid disease in the retroperitoneum adjacent to surgical clips and retrocaval andinteraortocaval lymphadenopathy. FDG avid focus right posterolateral to urinary bladder might represent another node.\"    ASSESSMENT AND PLAN: Ms. 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     1. Upper tract urothelial " cancer:   - No clinical evidence of disease progression at this time. She did have some concerning CT and PET/CT findings previously and will need reimaging studies soon.  - Continue carboplatin 5AUC IV on day 1 with gemcitabine 1000mg/m2 IV on days 1 and 8 of each 3 week cycle for 4 cycles total as she is unlikely to tolerate >4 cycles.   - Aware of side effects of carboplatin and gemcitabine chemotherapy at length including fatigue, nausea, vomiting, diarrhea, myelosuppression, nephropathy, neuropathy, and other side effects including second malignacies etc.    - Continue pre-meds on day 1 and Neulasta on-pro on day 8.  - Asked to avoid Ativan due to age and risk of delirium.  - Day 8 and 15 labs, hydration and PRN transfusions as under.  - Restaging CT C/A/P in 3 weeks.     2. Renal artery saccular aneursym:  - Management per IR team.   3. Chemo induced anemia and thrombocytopenia:  - Labs on days 1, 8 and 15 of this cycle with PRN blood products to keep Hb > 8 and platelets > 50K IF she has major bleeding.   - Advised to continue holding aspirin 81mg for at least next 2 weeks and resume after platelets recover > 75K.   4. History of afib, recent episode of palpitations:  - Will arrange for labs, hydration and PRN blood transfusions to maintain Hb > 8 to decrease risk of cardiac adverse events.  - Pt encouraged to keep cardiology follow-ups.   Return to see me in 3 weeks.   All of the above was explained to the patient in lay language and several questions were answered. They are in agreement with the plan.      Jaden Toledo M.D.

## 2019-02-13 NOTE — NURSING NOTE
"Oncology Rooming Note    February 13, 2019 9:10 AM   Dimple Oviedo is a 85 year old female who presents for:    Chief Complaint   Patient presents with     Port Draw     Labs drawn via port by RN in lab. Line flushed and hep locked.     Oncology Clinic Visit     Return; Urothelial Carcinoma of Right Distal Ureter     Initial Vitals: /69 (BP Location: Right arm, Patient Position: Sitting, Cuff Size: Adult Regular)   Pulse 64   Temp 97.9  F (36.6  C) (Oral)   Resp 18   Wt 64.8 kg (142 lb 14.4 oz)   SpO2 98%   BMI 30.92 kg/m   Estimated body mass index is 30.92 kg/m  as calculated from the following:    Height as of 2/7/19: 1.448 m (4' 9\").    Weight as of this encounter: 64.8 kg (142 lb 14.4 oz). Body surface area is 1.61 meters squared.  No Pain (0) Comment: Data Unavailable   No LMP recorded. Patient is postmenopausal.  Allergies reviewed: Yes  Medications reviewed: Yes    Medications: MEDICATION REFILLS NEEDED TODAY. Provider was notified.  Pharmacy name entered into Pak[employer]:    [id number] - [place], [place] - [address] AT [facility] & [address] [place] [address] PHARMACY MAIL DELIVERY - [facility] [id number]    Clinical concerns: Patient requests refills of Ativan and Zofran today. No other Concerns today. Toledo was notified.    7 minutes for nursing intake (face to face time)     Bailey Valdez MA              "

## 2019-02-13 NOTE — PROGRESS NOTES
Infusion Nursing Note:  Dimple Oviedo presents today for C4D1 Gemzar-Carboplatin.    Patient seen by provider today: Yes: Dr Toledo    Treatment Conditions:  Lab Results   Component Value Date    HGB 9.6 02/13/2019     Lab Results   Component Value Date    WBC 9.3 02/13/2019      Lab Results   Component Value Date    ANEU 7.1 02/13/2019     Lab Results   Component Value Date     02/13/2019      Lab Results   Component Value Date     02/13/2019                   Lab Results   Component Value Date    POTASSIUM 4.3 02/13/2019           Lab Results   Component Value Date    MAG 1.8 02/03/2019            Lab Results   Component Value Date    CR 1.00 02/13/2019                   Lab Results   Component Value Date    SHREYA 8.8 02/13/2019                Lab Results   Component Value Date    BILITOTAL 0.3 02/13/2019           Lab Results   Component Value Date    ALBUMIN 3.3 02/13/2019                    Lab Results   Component Value Date    ALT 20 02/13/2019           Lab Results   Component Value Date    AST 19 02/13/2019       Results reviewed, labs MET treatment parameters, ok to proceed with treatment.    Intravenous Access:  Implanted Port.  Access dc'd at time of discharge.      Note:   Results reviewed, copy given to patient.  Proceed with treatment.    Copy of AVS given to patient. + Blood return from PORT pre and post infusion.  Tolerated infusion without incident. Zofran Prescription filled today.   D/C in care of daughter.  Pt will return 2/20 for C4D8 Gemzar.       Whitney Luque RN

## 2019-02-14 ENCOUNTER — TELEPHONE (OUTPATIENT)
Dept: ONCOLOGY | Facility: CLINIC | Age: 84
End: 2019-02-14

## 2019-02-14 NOTE — PROGRESS NOTES
INTERVENTIONAL RADIOLOGY CONSULTATION    Name: Dimple Oviedo  Age: 85 year old   Referring Physician: Dr. Toledo   REASON FOR REFERRAL: Renal Artery Aneursym     HPI: Ms. 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  She is currently on chemotherapy.     She does not have any symptoms related to left renal aneurysm.  Denies back pain, hematuria or hypertension.  CT from 1/22/2019 demonstrates 1.8 cm aneurysm of the left renal and artery which appears minimally increased in size compared to 2015.      PAST MEDICAL HISTORY:   Past Medical History:   Diagnosis Date     Calculus of kidney      Esophageal reflux      GERD (gastroesophageal reflux disease)      Hyperlipidemia LDL goal <130 5/9/2010     Malignant melanoma of skin of trunk, except scrotum (H)      Nonspecific abnormal finding     has living will 2004 -      Nontoxic multinodular goiter     no further eval /tx rec per pt     Osteopenia      Other psoriasis      Personal history of colonic polyps      PMR (polymyalgia rheumatica) (H)      Stress-induced cardiomyopathy      Undiagnosed cardiac murmurs      Unspecified constipation      Unspecified essential hypertension      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       PAST SURGICAL HISTORY:   Past Surgical History:   Procedure Laterality Date     BIOPSY       C NONSPECIFIC PROCEDURE  2005    colonoscopy polyp repeat 2010     COLONOSCOPY  2014     COMBINED CYSTOSCOPY, INSERT STENT URETER(S) Bilateral 9/12/2018    Procedure: COMBINED CYSTOSCOPY, INSERT STENT URETER(S);  Cystoscopy Bilateral ureteral Stent Placement.;  Surgeon: Justin Henry MD;  [facility]     COMBINED CYSTOSCOPY, RETROGRADES, URETEROSCOPY, INSERT STENT Bilateral 4/3/2018    Procedure: COMBINED CYSTOSCOPY, RETROGRADES, URETEROSCOPY, INSERT STENT;;  Surgeon: Stuart King MD;  [facility]      COMBINED CYSTOSCOPY, RETROGRADES, URETEROSCOPY, INSERT STENT Bilateral 9/10/2018    Procedure: COMBINED CYSTOSCOPY, RETROGRADES, URETEROSCOPY, INSERT STENT;  Cystoscopy, Bilateral Ureteroscopy, Bladder Biopsies, Retrogram Pyelograms, Ureteral Washings and brushings, cysview;  Surgeon: Stuart King MD;  [facility]     CYSTOSCOPY, BIOPSY BLADDER INSTILL OPTICAL AGENT N/A 4/3/2018    Procedure: CYSTOSCOPY, BIOPSY BLADDER INSTILL OPTICAL AGENT;  Cystoscopy, Blue Light Cystoscopy, Bladder Biopsies, Bilateral Selective ureteral washings for Cytology, Bilateral Retrograde Pyelograms, Bilateral Ureteroscopy;  Surgeon: Stuart King MD;  [facility]     CYSTOSCOPY, BIOPSY BLADDER, COMBINED N/A 2/19/2018    Procedure: COMBINED CYSTOSCOPY, BIOPSY BLADDER;  Cystoscopy, Bladder Biopsy;  Surgeon: Kenna La MD;  [facility]     CYSTOSCOPY, REMOVE STENT(S), COMBINED  11/13/2018    Procedure: Flexible Cystoscopy with Stent Removal;  Surgeon: Stuart King MD;  [facility]     DAVINCI LYMPHADENECTOMY N/A 11/13/2018    Procedure: Davinci Lymphadenectomy ;  Surgeon: Stuart King MD;  [facility]     DAVINCI NEPHROURETERECTOMY N/A 11/13/2018    Procedure: Right DaVinci Assisted Nephroureterectomy;  Surgeon: Stuart King MD;  [facility]     ENDOSCOPIC ULTRASOUND LOWER GASTROINTESTIONAL TRACT (GI) N/A 10/30/2015    Procedure: ENDOSCOPIC ULTRASOUND LOWER GASTROINTESTIONAL TRACT (GI);  Surgeon: Daniel Jean-Baptiste MD;  [facility]     EYE SURGERY  12/4/17     INSERT PORT VASCULAR ACCESS Right 12/19/2018    Procedure: Chest Port Placement - right;  Surgeon: Stuart Chavez PA-C;  [facility]     IR CHEST PORT PLACEMENT > 5 YRS OF AGE  12/19/2018     LAPAROSCOPIC CHOLECYSTECTOMY WITH CHOLANGIOGRAMS N/A 11/1/2015    Procedure: LAPAROSCOPIC CHOLECYSTECTOMY WITH CHOLANGIOGRAMS;  Surgeon: Tonie Warren MD;   [facility]     SURGICAL HISTORY OF -   1996    malignant melanoma     SURGICAL HISTORY OF -   1968    thyroid nodule     SURGICAL HISTORY OF -       D & C       FAMILY HISTORY:   Family History   Problem Relation Age of Onset     Cancer Father         dec - esophageal and laryngeal     Heart Disease Mother      Respiratory Mother         dec     Breast Cancer Daughter      Other Cancer Daughter      Thyroid Disease Daughter      Asthma Daughter      Hyperlipidemia Son      Diabetes Son        SOCIAL HISTORY:   Social History     Tobacco Use     Smoking status: Never Smoker     Smokeless tobacco: Never Used   Substance Use Topics     Alcohol use: No     Alcohol/week: 0.0 oz       PROBLEM LIST:   Patient Active Problem List    Diagnosis Date Noted     Encounter for antineoplastic chemotherapy 01/09/2019     Priority: Medium     Graves disease 12/04/2018     Priority: Medium     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     Priority: Medium     CRESENCIO (acute kidney injury) (H) 09/11/2018     Priority: Medium     Hydronephrosis 09/11/2018     Priority: Medium     Restless legs syndrome 07/11/2018     Priority: Medium     Hyperthyroidism 06/28/2018     Priority: Medium     Urothelial carcinoma (H) 03/22/2018     Priority: Medium     Chronic systolic heart failure (H) 02/20/2018     Priority: Medium     Anxiety 02/07/2018     Priority: Medium     A-fib (H) 01/16/2018     Priority: Medium     Stress-induced cardiomyopathy 12/14/2017     Priority: Medium     NSTEMI (non-ST elevated myocardial infarction) (H) 12/13/2017     Priority: Medium     Iron deficiency anemia, unspecified iron deficiency anemia type 11/30/2017     Priority: Medium     Obesity, unspecified obesity severity, unspecified obesity type 02/22/2017     Priority: Medium     Chronic bilateral low back pain without sciatica 12/05/2016     Priority: Medium     Impaired fasting blood sugar 11/24/2015     Priority: Medium     Shoulder pain  "10/31/2014     Priority: Medium     High risk medication use 10/15/2014     Priority: Medium     Polymyalgia rheumatica (H) 07/10/2014     Priority: Medium     Hip arthritis 06/23/2014     Priority: Medium     Hypertension goal BP (blood pressure) < 140/90 11/30/2011     Priority: Medium     Urinary incontinence 11/30/2011     Priority: Medium     Osteoarthritis of left knee 11/30/2011     Priority: Medium     Hyperlipidemia LDL goal <130 05/09/2010     Priority: Medium     Nontoxic multinodular goiter      Priority: Medium     Malignant melanoma of skin of trunk, except scrotum (H)      Priority: Medium     perirectal cyst 12/16/2005     Priority: Medium     Restless leg syndrome 10/12/2005     Priority: Medium     heat intolerance 10/12/2005     Priority: Medium     Goiter 10/12/2005     Priority: Medium     Problem list name updated by automated process. Provider to review       Disorder of bone and cartilage 10/12/2005     Priority: Medium     Problem list name updated by automated process. Provider to review       Other psoriasis 10/12/2005     Priority: Medium     Esophageal reflux 09/22/2004     Priority: Medium       MEDICATIONS:   Prescription Medications as of 2/14/2019       Rx Number Disp Refills Start End Last Dispensed Date Next Fill Date Owning Pharmacy    acetaminophen (TYLENOL) 650 MG CR tablet  100 tablet 0 11/14/2018    [facility] - [place], [place] - [address] [facility]    Sig: Take 1 tablet (650 mg) by mouth every 8 hours as needed for pain    Class: E-Prescribe    Route: Oral    alendronate (FOSAMAX) 70 MG tablet  12 tablet 1 12/29/2018    [facility] - [facility] [address]    Sig: TAKE 1 TABLET EVERY 7 DAYS AT LEAST 60 MIN BEFORE BREAKFAST AS DIRECTED \"SEE PACKAGE FOR ADDITIONAL INSTRUCTIONS\"    Class: E-Prescribe    aspirin 81 MG tablet            Sig: Take 81 mg by mouth every evening     Class: Historical    Route: Oral    Calcium " Citrate-Vitamin D (CALCIUM + D PO)            Sig: Take 1 tablet by mouth 2 times daily     Class: Historical    Route: Oral    cycloSPORINE (RESTASIS) 0.05 % ophthalmic emulsion            Sig: Place 1 drop into both eyes 2 times daily    Class: Historical    Route: Both Eyes    ferrous sulfate 325 (65 Fe) MG TBEC EC tablet            Sig: Take 325 mg by mouth daily    Class: Historical    Route: Oral    furosemide (LASIX) 20 MG tablet  90 tablet 1 9/19/2018    [facility] [place] [address] [address]    Sig: Take 1 tablet (20 mg) by mouth every morning    Class: E-Prescribe    Route: Oral    irbesartan (AVAPRO) 300 MG tablet  90 tablet 3 1/26/2019    [facility] [address]    Sig: TAKE 1 TABLET EVERY DAY    Class: E-Prescribe    lidocaine-prilocaine (EMLA) 2.5-2.5 % external cream  30 g 0 1/2/2019 1/2/2020   [address]    Sig: Apply topically as needed for moderate pain    Class: E-Prescribe    Route: Topical    LORazepam (ATIVAN) 0.5 MG tablet (Ended)  30 tablet 0 1/9/2019 2/13/2019   [address]    Sig: Take 1 tablet (0.5 mg) by mouth every 6 hours as needed for anxiety, nausea or sleep    Class: Local Print    Route: Oral    lovastatin (MEVACOR) 40 MG tablet  90 tablet 2 6/29/2018    [facility] - [place] [address] [address]    Sig: TAKE 1 TABLET AT BEDTIME (HYPERLIPIDEMIA LDL GOAL BELOW 130)    Class: E-Prescribe    methimazole (TAPAZOLE) 5 MG tablet  225 tablet 3 1/4/2019    [facility] - [place] [address] [address]    Sig: 10 mg alternating with 15 mg every other day    Class: E-Prescribe    Notes to Pharmacy: Updated order, Per Pharmacy request.    nitroGLYcerin (NITROSTAT) 0.4 MG sublingual tablet  25 tablet 3 12/14/2017    [facility]  [facility] [place] [facility] - [place], [place] - [address] [facility]    Sig: For chest pain place 1 tablet under the tongue every 5 minutes for 3 doses. If symptoms persist 5 minutes after 1st dose call 911.    Class: E-Prescribe    Omega-3 Fatty Acids (FISH OIL) 500 MG CAPS            Sig: Take 1 capsule by mouth 2 times daily     Class: Historical    Route: Oral    omeprazole (PRILOSEC) 20 MG DR capsule  90 capsule 0 1/30/2019    [facility] - [facility] [address] [place] [address]    Sig: Take 1 capsule (20 mg) by mouth daily    Class: E-Prescribe    Route: Oral    ondansetron (ZOFRAN) 8 MG tablet  30 tablet 5 2/13/2019    [place], [place] - [address] [facility] [phone]    Sig: Take 1 tablet (8 mg) by mouth every 8 hours as needed (Nausea/Vomiting)    Class: E-Prescribe    Route: Oral    Non-formulary Exception Code: Specific indication for non-formulary alternative    order for DME  1 each 2 8/13/2018        Sig: Equipment being ordered: Knee high compression for B LE 20-30mmHg, Velcro units for night time (consider hybrid sock as foot swelling is less than leg swelling), Donning device    Class: Local Print    polyethylene glycol (MIRALAX/GLYCOLAX) packet    11/2/2015        Sig: Take 17 g by mouth    Class: Historical    Route: Oral    Probiotic Product (PROBIOTIC ADVANCED PO)            Sig: Take 1 capsule by mouth every morning     Class: Historical    Route: Oral    prochlorperazine (COMPAZINE) 10 MG tablet  30 tablet 5 12/12/2018    [id number] - [place], [place] - [address] [place] [address] AVE AT [address]    Sig: Take 0.5 tablets (5 mg) by mouth every 6 hours as needed (Nausea/Vomiting - take if Zofran doesn't work after 30 mins.)    Class: E-Prescribe    Route: Oral    Non-formulary Exception Code: Specific indication for non-formulary alternative    propranolol ER (INDERAL LA) 60 MG 24 hr capsule  90 capsule 1 1/9/2019    [facility] Mail  [facility] [address] [facility]    Sig: TAKE 1 CAPSULE EVERY DAY    Class: E-Prescribe    rOPINIRole (REQUIP) 0.25 MG tablet  180 tablet 1 2018    [facility] [address] [place] [address] [address] AT [address]    Si.25 mg in the afternoon and 0.5 mg at night    Class: Local Print    senna-docusate (SENOKOT-S;PERICOLACE) 8.6-50 MG per tablet  60 tablet 0 2018    [place], [place] - [address] [facility]    Sig: Take 1 tablet by mouth 2 times daily To prevent constipation    Class: E-Prescribe    Route: Oral    sertraline (ZOLOFT) 100 MG tablet  90 tablet 1 2018    [facility] [address] [facility]    Sig: Take 1 tablet (100 mg) by mouth every morning    Class: E-Prescribe    Route: Oral    traZODone (DESYREL) 50 MG tablet  45 tablet 0 2019    [facility] [address] [facility]    Sig: TAKE 1/2 TABLET(25 MG)  AT BEDTIME    Class: E-Prescribe      Clinic-Administered Medications as of 2019       Dose Frequency Start End    0.9% sodium chloride BOLUS 500 mL ONCE 2019    Sig: Inject 500 mLs into the vein once    Class: E-Prescribe    Route: Intravenous    CARBOplatin 300 mg in sodium chloride 0.9 % 305 mL CHEMOTHERAPY 300 mg ONCE 2019    Sig: Inject 300 mg into the vein once    Route: Intravenous    heparin 100 UNIT/ML injection 5 mL 5 mL ONCE 2019    Si mLs by Intracatheter route once    Class: E-Prescribe    Route: Intracatheter    sodium chloride (PF) 0.9% PF flush 10 mL 10 mL ONCE 2019    Sig: 10 mLs by Intracatheter route once    Class: E-Prescribe    Route: Intracatheter            ALLERGIES:   Codeine     ROS:  11 point review of the systems is negative except as stated in HPI.        Physical Examination:   VITALS:   /67 (BP Location: Left arm, Patient Position: Chair,  Cuff Size: Adult Regular)   Pulse 58   SpO2 97%   Constitutional: healthy, alert and no distress  Cardiovascular: negative, PMI normal. No lifts, heaves, or thrills. RRR. No murmurs, clicks gallops or rub  Respiratory: negative, Percussion normal. Good diaphragmatic excursion. Lungs clear     Labs:    BMP RESULTS:  Lab Results   Component Value Date     02/13/2019    POTASSIUM 4.3 02/13/2019    CHLORIDE 102 02/13/2019    CO2 27 02/13/2019    ANIONGAP 7 02/13/2019    GLC 89 02/13/2019    BUN 35 (H) 02/13/2019    CR 1.00 02/13/2019    GFRESTIMATED 51 (L) 02/13/2019    GFRESTBLACK 60 (L) 02/13/2019    SHREYA 8.8 02/13/2019        CBC RESULTS:  Lab Results   Component Value Date    WBC 9.3 02/13/2019    RBC 3.36 (L) 02/13/2019    HGB 9.6 (L) 02/13/2019    HCT 31.7 (L) 02/13/2019    MCV 94 02/13/2019    MCH 28.6 02/13/2019    MCHC 30.3 (L) 02/13/2019    RDW 22.3 (H) 02/13/2019     (L) 02/13/2019       INR/PTT:  Lab Results   Component Value Date    INR 0.97 02/07/2019    PTT 27 02/07/2019          Diagnostic studies: CT from 1/22/2019 demonstrates a fusiform aneurysm of the left renal artery.       Assessment and Plan:   85-year-old female with transitional cell carcinoma of the right kidney status post nephrectomy.  She was referred to us for treatment considerations for left renal artery aneurysm.  We explained to her that the risk of rupture with renal artery aneurysms is is not high.  The biggest risk associated with the renal artery aneurysm is emboli to the kidney.  The risk of emboli is also not very high.  Since she does not have any symptoms and the risk of complications associated with the aneurysm is not high, we think there is no indication for treatment at this point.  We ordered an ultrasound today, to better delineate the anatomy and to see if there is any thrombus in the aneurysm. We explained to her that we will watch the aneurysm with imaging every 6 months or yearly. If the aneurysm  increases in size or there is suggestion of thrombus formation we may do intervention.     It was pleasure to see the patient.     Francisco Hamm  Vascular and Interventional Radiology Fellow       I have seen the patient with the fellow and agree with the note.      CC  Patient Care Team:  Pop Zapien MD as PCP - General (Family Practice)  Pop Zapien MD as PCP - Assigned PCP  Crista Richards, APRN CNP as PCP - Cardiology (Nurse Practitioner)  Vincenzo Tovar MD as MD (Family Medicine - Sports Medicine)  Candelaria Raymundo MD as MD (Gastroenterology)  Shavon Bustamante PA-C as Physician Assistant (Physician Assistant)  Kenna La MD as MD (Urology)  Cristiana Correa, RN as Registered Nurse (Urology)  Pop Zapien MD as Referring Physician (Family Practice)  Kiera Figueroa, ATUL as Registered Nurse (Urology)  Stuart King MD as MD (Urology)  Kiera Figueroa, RN as Registered Nurse (Urology)  Geovanna Fregoso PA as Physician Assistant (Urology)  Albert Ahuja MD as MD (Internal Medicine-Hematology & Oncology)  Rose Estrella, RN as Nurse Coordinator (Oncology)  Omayra Gatica, ATUL as Clinic Care Coordinator (Primary Care - CC)  ALBERT AHUJA

## 2019-02-14 NOTE — TELEPHONE ENCOUNTER
"[facility] Telephone Triage Note    Assessment: Family Member Day called in to triage reporting the following symptoms: patient had infusion yesterday and afterwards ate a cheeseburger. This morning, her daughter-in-law reports that this morning, she woke up with swollen eyes and face and wants to know if it is from her infusion. Eyes are not shut, only slightly puffy and feel \"squishy\". Patient has applied a cool compress with reduction in level of swelling. No evidence of pain or redness, no diffculty breathing. Writer heard patient in background stating descriptions to Day.    Recommendations:Writer recommended continued cool compressed for 10-15 minutes at a time and to monitor. If symptoms get worse or do not improve, call clinic back this afternoon for evaluation..    Follow-Up: Instructed patient to seek care immediately for worsening symptoms, including: fever, chest pain, shortness of breath, dizziness. Patient voiced understanding of advice and/or instructions given.       "

## 2019-02-15 ENCOUNTER — TELEPHONE (OUTPATIENT)
Dept: FAMILY MEDICINE | Facility: CLINIC | Age: 84
End: 2019-02-15

## 2019-02-15 DIAGNOSIS — F41.1 GAD (GENERALIZED ANXIETY DISORDER): ICD-10-CM

## 2019-02-15 DIAGNOSIS — I87.303 STASIS EDEMA OF BOTH LOWER EXTREMITIES: ICD-10-CM

## 2019-02-15 DIAGNOSIS — I50.30 (HFPEF) HEART FAILURE WITH PRESERVED EJECTION FRACTION (H): ICD-10-CM

## 2019-02-15 NOTE — TELEPHONE ENCOUNTER
"Requested Prescriptions   Pending Prescriptions Disp Refills     sertraline (ZOLOFT) 100 MG tablet [Pharmacy Med Name: SERTRALINE  MG Tablet]  Last Written Prescription Date:  9/19/18  Last Fill Quantity: 90 TABLET,  # refills: 1   Last office visit: 1/16/2019 with prescribing provider:  CHELSI   Future Office Visit:     90 tablet 1     Sig: TAKE 1 TABLET (100 MG) BY MOUTH EVERY MORNING    SSRIs Protocol Passed - 2/15/2019  2:42 PM  PHQ-9 SCORE 2/22/2017 1/24/2018 7/11/2018   PHQ-9 Total Score 2 11 10     THERON-7 SCORE 2/22/2017 1/24/2018 5/16/2018   Total Score - - 6 (mild anxiety)   Total Score 1 18 6              Passed - Recent (12 mo) or future (30 days) visit within the authorizing provider's specialty    Patient had office visit in the last 12 months or has a visit in the next 30 days with authorizing provider or within the authorizing provider's specialty.  See \"Patient Info\" tab in inbasket, or \"Choose Columns\" in Meds & Orders section of the refill encounter.             Passed - Medication is active on med list       Passed - Patient is age 18 or older       Passed - No active pregnancy on record       Passed - No positive pregnancy test in last 12 months        furosemide (LASIX) 20 MG tablet [Pharmacy Med Name: FUROSEMIDE 20 MG Tablet]  Last Written Prescription Date:  9/19/18  Last Fill Quantity: 90 TABLET,  # refills: 1   Last office visit: 1/16/2019 with prescribing provider:  CHELSI   Future Office Visit:     90 tablet 1     Sig: TAKE 1 TABLET (20 MG) BY MOUTH EVERY MORNING    Diuretics (Including Combos) Protocol Failed - 2/15/2019  2:42 PM       Failed - Blood pressure under 140/90 in past 12 months    BP Readings from Last 3 Encounters:   02/13/19 144/69   02/12/19 149/67   02/07/19 172/77                Passed - Recent (12 mo) or future (30 days) visit within the authorizing provider's specialty    Patient had office visit in the last 12 months or has a visit in the next 30 days with " "authorizing provider or within the authorizing provider's specialty.  See \"Patient Info\" tab in inbasket, or \"Choose Columns\" in Meds & Orders section of the refill encounter.             Passed - Medication is active on med list       Passed - Patient is age 18 or older       Passed - No active pregancy on record       Passed - Normal serum creatinine on file in past 12 months    Recent Labs   Lab Test 02/13/19  0901   CR 1.00             Passed - Normal serum potassium on file in past 12 months    Recent Labs   Lab Test 02/13/19  0901   POTASSIUM 4.3                   Passed - Normal serum sodium on file in past 12 months    Recent Labs   Lab Test 02/13/19  0901                Passed - No positive pregnancy test in past 12 months          "

## 2019-02-19 RX ORDER — FUROSEMIDE 20 MG
20 TABLET ORAL EVERY MORNING
Qty: 30 TABLET | Refills: 0 | Status: SHIPPED | OUTPATIENT
Start: 2019-02-19 | End: 2019-03-14

## 2019-02-19 RX ORDER — SERTRALINE HYDROCHLORIDE 100 MG/1
100 TABLET, FILM COATED ORAL EVERY MORNING
Qty: 90 TABLET | Refills: 1 | Status: SHIPPED | OUTPATIENT
Start: 2019-02-19 | End: 2019-08-19

## 2019-02-19 NOTE — TELEPHONE ENCOUNTER
"Routing refill request to provider for review/approval because:  BP elevated    Dr. Zapien/Covering providers-Please advise if office visit or MA only BP check preferred for follow up.  One month supply Furosemide pended.      Sertraline prescribed for anxiety.    Per 9/19/18 office visit note:  \"(F41.1) THERON (generalized anxiety disorder)  Comment: Patient is tolerating current medication without any major side effects or concerns and current dose seems reasonable too.  Current medication regime is effective. Continue current treatment without any changes.    Plan: sertraline (ZOLOFT) 100 MG tablet\"    Sertraline refilled according to refill protocol.    Thank you!  ITALO Gill, BSN, RN    "

## 2019-02-20 ENCOUNTER — INFUSION THERAPY VISIT (OUTPATIENT)
Dept: ONCOLOGY | Facility: CLINIC | Age: 84
End: 2019-02-20
Attending: INTERNAL MEDICINE
Payer: MEDICARE

## 2019-02-20 VITALS
HEART RATE: 67 BPM | DIASTOLIC BLOOD PRESSURE: 68 MMHG | BODY MASS INDEX: 31.16 KG/M2 | RESPIRATION RATE: 16 BRPM | SYSTOLIC BLOOD PRESSURE: 153 MMHG | TEMPERATURE: 97.9 F | OXYGEN SATURATION: 96 % | WEIGHT: 144 LBS

## 2019-02-20 DIAGNOSIS — Z51.11 ENCOUNTER FOR ANTINEOPLASTIC CHEMOTHERAPY: ICD-10-CM

## 2019-02-20 DIAGNOSIS — E05.00 GRAVES DISEASE: ICD-10-CM

## 2019-02-20 DIAGNOSIS — C68.9 UROTHELIAL CARCINOMA (H): ICD-10-CM

## 2019-02-20 DIAGNOSIS — C66.1 UROTHELIAL CARCINOMA OF RIGHT DISTAL URETER (H): ICD-10-CM

## 2019-02-20 DIAGNOSIS — D50.9 IRON DEFICIENCY ANEMIA, UNSPECIFIED IRON DEFICIENCY ANEMIA TYPE: Primary | ICD-10-CM

## 2019-02-20 LAB
ABO + RH BLD: NORMAL
ABO + RH BLD: NORMAL
BASOPHILS # BLD AUTO: 0 10E9/L (ref 0–0.2)
BASOPHILS NFR BLD AUTO: 1.2 %
BLD GP AB SCN SERPL QL: NORMAL
BLD PROD TYP BPU: NORMAL
BLD PROD TYP BPU: NORMAL
BLD UNIT ID BPU: 0
BLOOD BANK CMNT PATIENT-IMP: NORMAL
BLOOD PRODUCT CODE: NORMAL
BPU ID: NORMAL
DIFFERENTIAL METHOD BLD: ABNORMAL
EOSINOPHIL # BLD AUTO: 0 10E9/L (ref 0–0.7)
EOSINOPHIL NFR BLD AUTO: 0.6 %
ERYTHROCYTE [DISTWIDTH] IN BLOOD BY AUTOMATED COUNT: 21.2 % (ref 10–15)
HCT VFR BLD AUTO: 25.1 % (ref 35–47)
HGB BLD-MCNC: 7.8 G/DL (ref 11.7–15.7)
IMM GRANULOCYTES # BLD: 0 10E9/L (ref 0–0.4)
IMM GRANULOCYTES NFR BLD: 0.6 %
LYMPHOCYTES # BLD AUTO: 0.6 10E9/L (ref 0.8–5.3)
LYMPHOCYTES NFR BLD AUTO: 36.8 %
MCH RBC QN AUTO: 29 PG (ref 26.5–33)
MCHC RBC AUTO-ENTMCNC: 31.1 G/DL (ref 31.5–36.5)
MCV RBC AUTO: 93 FL (ref 78–100)
MONOCYTES # BLD AUTO: 0.2 10E9/L (ref 0–1.3)
MONOCYTES NFR BLD AUTO: 10.5 %
NEUTROPHILS # BLD AUTO: 0.9 10E9/L (ref 1.6–8.3)
NEUTROPHILS NFR BLD AUTO: 50.3 %
NRBC # BLD AUTO: 0 10*3/UL
NRBC BLD AUTO-RTO: 0 /100
NUM BPU REQUESTED: 1
PLATELET # BLD AUTO: 176 10E9/L (ref 150–450)
RBC # BLD AUTO: 2.69 10E12/L (ref 3.8–5.2)
SPECIMEN EXP DATE BLD: NORMAL
TRANSFUSION STATUS PATIENT QL: NORMAL
TRANSFUSION STATUS PATIENT QL: NORMAL
WBC # BLD AUTO: 1.7 10E9/L (ref 4–11)

## 2019-02-20 PROCEDURE — 96377 APPLICATON ON-BODY INJECTOR: CPT | Mod: 59

## 2019-02-20 PROCEDURE — 25000128 H RX IP 250 OP 636: Mod: ZF | Performed by: INTERNAL MEDICINE

## 2019-02-20 PROCEDURE — 86900 BLOOD TYPING SEROLOGIC ABO: CPT | Performed by: PHYSICIAN ASSISTANT

## 2019-02-20 PROCEDURE — 36430 TRANSFUSION BLD/BLD COMPNT: CPT

## 2019-02-20 PROCEDURE — 25800030 ZZH RX IP 258 OP 636: Mod: ZF | Performed by: INTERNAL MEDICINE

## 2019-02-20 PROCEDURE — 85025 COMPLETE CBC W/AUTO DIFF WBC: CPT | Performed by: PHYSICIAN ASSISTANT

## 2019-02-20 PROCEDURE — 86923 COMPATIBILITY TEST ELECTRIC: CPT | Performed by: PHYSICIAN ASSISTANT

## 2019-02-20 PROCEDURE — 86901 BLOOD TYPING SEROLOGIC RH(D): CPT | Performed by: PHYSICIAN ASSISTANT

## 2019-02-20 PROCEDURE — 96413 CHEMO IV INFUSION 1 HR: CPT

## 2019-02-20 PROCEDURE — 96367 TX/PROPH/DG ADDL SEQ IV INF: CPT

## 2019-02-20 PROCEDURE — 96375 TX/PRO/DX INJ NEW DRUG ADDON: CPT

## 2019-02-20 PROCEDURE — 86850 RBC ANTIBODY SCREEN: CPT | Performed by: PHYSICIAN ASSISTANT

## 2019-02-20 PROCEDURE — P9016 RBC LEUKOCYTES REDUCED: HCPCS | Performed by: PHYSICIAN ASSISTANT

## 2019-02-20 RX ORDER — LIDOCAINE/PRILOCAINE 2.5 %-2.5%
CREAM (GRAM) TOPICAL PRN
Qty: 30 G | Refills: 0 | Status: SHIPPED | OUTPATIENT
Start: 2019-02-20 | End: 2019-09-11

## 2019-02-20 RX ORDER — PALONOSETRON 0.05 MG/ML
0.25 INJECTION, SOLUTION INTRAVENOUS ONCE
Status: COMPLETED | OUTPATIENT
Start: 2019-02-20 | End: 2019-02-20

## 2019-02-20 RX ORDER — HEPARIN SODIUM (PORCINE) LOCK FLUSH IV SOLN 100 UNIT/ML 100 UNIT/ML
5 SOLUTION INTRAVENOUS EVERY 8 HOURS
Status: DISCONTINUED | OUTPATIENT
Start: 2019-02-20 | End: 2019-02-20 | Stop reason: HOSPADM

## 2019-02-20 RX ORDER — HEPARIN SODIUM (PORCINE) LOCK FLUSH IV SOLN 100 UNIT/ML 100 UNIT/ML
5 SOLUTION INTRAVENOUS ONCE
Status: COMPLETED | OUTPATIENT
Start: 2019-02-20 | End: 2019-02-20

## 2019-02-20 RX ADMIN — PALONOSETRON HYDROCHLORIDE 0.25 MG: 0.25 INJECTION INTRAVENOUS at 12:56

## 2019-02-20 RX ADMIN — DEXAMETHASONE SODIUM PHOSPHATE 150 MG: 10 INJECTION, SOLUTION INTRAMUSCULAR; INTRAVENOUS at 12:56

## 2019-02-20 RX ADMIN — HEPARIN 5 ML: 100 SYRINGE at 16:11

## 2019-02-20 RX ADMIN — SODIUM CHLORIDE 1000 ML: 9 INJECTION, SOLUTION INTRAVENOUS at 12:31

## 2019-02-20 RX ADMIN — GEMCITABINE 1600 MG: 38 INJECTION, SOLUTION INTRAVENOUS at 13:32

## 2019-02-20 RX ADMIN — HEPARIN SODIUM (PORCINE) LOCK FLUSH IV SOLN 100 UNIT/ML 5 ML: 100 SOLUTION at 11:21

## 2019-02-20 RX ADMIN — PEGFILGRASTIM 6 MG: KIT SUBCUTANEOUS at 14:31

## 2019-02-20 NOTE — NURSING NOTE
Chief Complaint   Patient presents with     Port Draw     Labs drawn via PORT by RN in lab. Line flushed with saline and heparin. VS taken.          Amy Rene RN

## 2019-02-20 NOTE — PROGRESS NOTES
"SPIRITUAL HEALTH SERVICES  SPIRITUAL ASSESSMENT Progress Note  [facility]     REASON FOR ENCOUNTER: Follow-up      Reviewed documentation. Supportive follow-up visit with Dimple.    Dimple affirmed (what I also gleaned from chart notes) that the past week has been difficult, including a bloody nose, nausea, and lack of appetite. She described going with Day after last week's appointment to eat and having a cheeseburger which led to her becoming ill.    While affirming her frustration, Dimple also expressed hopefulness, as she understands today was her last chemo treatment and she is trusting that the side-effects will begin to dissipate. She expressed hopefulness and a little anxiety as she waits to see \"if the chemo worked.\"    Stanley reflected on two other people in her life who have cancer whose diagnoses are \"worse than hers,\" and her sadness for them.    Dimple's [facility] daughter Nidia was expected to arrive in [place] today to go with Dimple so she could return to her senior co-op apartment in [place] (she has been staying with son and daugther-in-law in [place] throughout her treatment) but Nidia's flight was cancelled due to the snow. Cullen is hopeful Nidia will be able to get a flight tomorrow. This led to a reflection on Dimple's strong desire to return to her senior co-op community and her stating that \"this is the last place I want to live\" and that she does not want to ever have to live in a nursing home.    She also reflected on the friends and acquaintances who had  in her nursing home, including one 97 year-old woman who she found dead in her bathroom (\"She was ready to go. And she wanted to die there and not go to a nursing home\").    Among Dimple's hopes for the coming weeks is that she will be able to return to her [facility] community (she hasn't been able to go since ) and that she will be able to sign int he choir again.    We concluded " our visit with a prayer: a prayer for Dimple's healing, for the effectiveness of her treatment, for the dissipation of her side-effects, for her return to the home she loves, Nidia's safe-travels, and gratitude for the ongoing support of her family.    Christoph Kauffman MDiv  Chaplain Resident  Pager [phone]  Cell [phone]

## 2019-02-20 NOTE — PROGRESS NOTES
Infusion Nursing Note:  Dimple Oviedo presents today for Cycle 4 Day 8 Gemcitabine, Neulasta OnPro, 1 unit PRBC.    Patient seen by provider today: No   present during visit today: Not Applicable.    Note: Patient states she has been feeling fatigued and has had a decreased appetite. Hgb is 7.8 and meets parameters to transfuse 1 unit PRBC today.  New consent signed today with patient and Dr. Toledo.    2/20/2019 12:20PM TORB Dr. Toledo/Adeline Reid RN- OK to proceed with treatment today with ANC 0.9    Intravenous Access:  Implanted Port.    Treatment Conditions:  Lab Results   Component Value Date    HGB 7.8 02/20/2019     Lab Results   Component Value Date    WBC 1.7 02/20/2019      Lab Results   Component Value Date    ANEU 0.9 02/20/2019     Lab Results   Component Value Date     02/20/2019      Lab Results   Component Value Date     02/13/2019                   Lab Results   Component Value Date    POTASSIUM 4.3 02/13/2019           Lab Results   Component Value Date    MAG 1.8 02/03/2019            Lab Results   Component Value Date    CR 1.00 02/13/2019                   Lab Results   Component Value Date    SHREYA 8.8 02/13/2019                Lab Results   Component Value Date    BILITOTAL 0.3 02/13/2019           Lab Results   Component Value Date    ALBUMIN 3.3 02/13/2019                    Lab Results   Component Value Date    ALT 20 02/13/2019           Lab Results   Component Value Date    AST 19 02/13/2019       Results reviewed, labs did NOT meet treatment parameters: ANC 0.9 see TORB above.  Blood transfusion consent signed 2/20/2019.      Post Infusion Assessment:  Patient tolerated infusion without incident.  Blood return noted pre and post infusion.  Site patent and intact, free from redness, edema or discomfort.  No evidence of extravasations.  Access discontinued per protocol.    Neulasta Onpro On-Body injector applied to left arm at 1431 with light facing down.  Writer  discussed Neulasta injection would start on 2/21/2019 at 1730, approximately 27 hours after application applied today.  Written and Verbal instruction reviewed with patient.  Pt instructed when the dose delivery starts, it will take about 45 minutes to complete.  Pt aware Neulasta Onpro On-Body should have green flashing light and to call triage or on-call MD if injector flashes red or appears to be leaking. Pt aware to keep Onpro On-Body Neulasta 4 inches away from electrical equipment and to avoid showering 4 hours prior to injection.   Neulasta Onpro Lot number: [id number]      Discharge Plan:   Prescription refills given for emla cream.  Discharge instructions reviewed with: Patient and Family.  Patient and/or family verbalized understanding of discharge instructions and all questions answered.  Copy of AVS reviewed with patient and/or family.  Patient will return 2/27/2019 for next infusion appointment.  Patient discharged in stable condition accompanied by: son.  Departure Mode: Ambulatory.    Adeline Reid RN

## 2019-02-20 NOTE — PATIENT INSTRUCTIONS
Contact Numbers    [facility] Main Line: [phone]  [facility] Triage and after hours / weekends / holidays:  [phone]      Please call the triage or after hours line if you experience a temperature greater than or equal to 100.5, shaking chills, have uncontrolled nausea, vomiting and/or diarrhea, dizziness, shortness of breath, chest pain, bleeding, unexplained bruising, or if you have any other new/concerning symptoms, questions or concerns.      If you are having any concerning symptoms or wish to speak to a provider before your next infusion visit, please call your care coordinator or triage to notify them so we can adequately serve you.     If you need a refill on a narcotic prescription or other medication, please call before your infusion appointment.                 February 2019 Sunday Monday Tuesday Wednesday Thursday Friday Saturday                            1     2       3    Admission   8:27 PM   Mason Muir MD   [facility], Emergency Department   (Discharge: 2/4/2019)    XR CHEST 2 VIEWS   9:45 PM   (15 min.)   UUXR3   [facility],  Radiology 4     5     6     7    Admission   5:53 PM   Lorne Macdonald MD   [facility] Emergency Department   (Discharge: 2/7/2019) 8     9       10     11    LAB  11:00 AM   (15 min.)   HW LAB   [facility] 12    [facility] NEW VASCULAR   9:25 AM   (40 min.)   Eric James MD   [facility] Vascular [facility]    US RENAL COMP W DUP COMP  11:45 AM   (60 min.)   UCUSV2   [facility]  13    [facility] LAB DRAW   8:15 AM   (15 min.)    [facility] LAB DRAW   [facility] Lab Draw    [facility] RETURN   8:45 AM   (30 min.)   Jaden Toledo MD   [facility] Cancer [facility] ONC INFUSION 360  10:30 AM   (360 min.)    ONCOLOGY INFUSION   [facility] Cancer [facility] 14     15     16       17     18     19     20    [facility] LAB DRAW  11:30 AM   (15 min.)    [facility] LAB DRAW   [facility] Lab Draw    [facility] ONC INFUSION  360  12:00 PM   (360 min.)   UC ONCOLOGY INFUSION   [facility] 21     22    CHEMO AUDIOGRAM  11:45 AM   (60 min.)   Dangelo Capellan AuD   [facility] Audiology 23       24     25     26     27    [facility] LAB DRAW  11:00 AM   (15 min.)    [facility]RAW   [facility]    [facility] ONC INFUSION 360  11:30 AM   (360 min.)    ONCOLOGY INFUSION   [facility] 28 March 2019 Sunday Monday Tuesday Wednesday Thursday Friday Saturday                            1    CT CHEST ABDOMEN PELVIS WWO   1:00 PM   (20 min.)   UCCT1   [facility] CT 2       3     4     5    [facility] RETURN ENDOCRINE   1:15 PM   (30 min.)   Dhara Meza MD   [facility] Endocrinology 6    [facility] LAB DRAW  10:00 AM   (15 min.)    [facility] LAB DRAW   [facility] Lab Draw    [facility] RETURN  10:15 AM   (30 min.)   Jaden Toledo MD   [facility]    [facility] ONC INFUSION 360  11:30 AM   (360 min.)    ONCOLOGY INFUSION   [facility] 7     8     9       10     11     12     13    [facility] LAB DRAW  11:30 AM   (15 min.)    [facility] DRAW   [facility]    [facility] ONC INFUSION 60  12:00 PM   (60 min.)    ONCOLOGY INFUSION   [facility] 14     15     16       17     18     19     20     21     22     23       24     25     26     27     28     29     30       31                                                  Recent Results (from the past 24 hour(s))   CBC with platelets differential    Collection Time: 02/20/19 11:30 AM   Result Value Ref Range    WBC 1.7 (L) 4.0 - 11.0 10e9/L    RBC Count 2.69 (L) 3.8 - 5.2 10e12/L    Hemoglobin 7.8 (L) 11.7 - 15.7 g/dL    Hematocrit 25.1 (L) 35.0 - 47.0 %    MCV 93 78 - 100 fl    MCH 29.0 26.5 - 33.0 pg    MCHC 31.1 (L) 31.5 - 36.5 g/dL    RDW 21.2 (H) 10.0 - 15.0 %    Platelet Count 176 150 - 450 10e9/L    Diff Method Automated Method     % Neutrophils 50.3 %     % Lymphocytes 36.8 %    % Monocytes 10.5 %    % Eosinophils 0.6 %    % Basophils 1.2 %    % Immature Granulocytes 0.6 %    Nucleated RBCs 0 0 /100    Absolute Neutrophil 0.9 (L) 1.6 - 8.3 10e9/L    Absolute Lymphocytes 0.6 (L) 0.8 - 5.3 10e9/L    Absolute Monocytes 0.2 0.0 - 1.3 10e9/L    Absolute Eosinophils 0.0 0.0 - 0.7 10e9/L    Absolute Basophils 0.0 0.0 - 0.2 10e9/L    Abs Immature Granulocytes 0.0 0 - 0.4 10e9/L    Absolute Nucleated RBC 0.0    ABO/Rh type and screen    Collection Time: 02/20/19 11:31 AM   Result Value Ref Range    Units Ordered 1     ABO O     RH(D) Pos     Antibody Screen Neg     Test Valid Only At          [facility]    Specimen Expires 02/23/2019     Crossmatch Red Blood Cells    Blood component    Collection Time: 02/20/19 11:31 AM   Result Value Ref Range    Unit Number [id number]     Blood Component Type Red Blood Cells Leukocyte Reduced     Division Number 00     Status of Unit Released to care unit 02/20/2019 1429     Blood Product Code [id number]     Unit Status ISS

## 2019-02-21 NOTE — TELEPHONE ENCOUNTER
Called [facility] and was informed that for both medications, the authorization was received on the 19th and the meds were shipped on the 20th.  Left message for patient to that effect.  Raina Al RN

## 2019-02-21 NOTE — TELEPHONE ENCOUNTER
Pt called to follow up on refill, says [facility] is telling her they are not getting any responses from clinic when requesting refills, says she has had issues the last 3 times and not getting things on time, left [facility] fax [phone] , you may leave a message at [phone] with her.

## 2019-02-22 ENCOUNTER — CARE COORDINATION (OUTPATIENT)
Dept: ONCOLOGY | Facility: CLINIC | Age: 84
End: 2019-02-22

## 2019-02-22 ENCOUNTER — OFFICE VISIT (OUTPATIENT)
Dept: AUDIOLOGY | Facility: CLINIC | Age: 84
End: 2019-02-22
Payer: MEDICARE

## 2019-02-22 ENCOUNTER — PATIENT OUTREACH (OUTPATIENT)
Dept: ONCOLOGY | Facility: CLINIC | Age: 84
End: 2019-02-22

## 2019-02-22 DIAGNOSIS — H90.3 SENSORY HEARING LOSS, BILATERAL: Primary | ICD-10-CM

## 2019-02-22 DIAGNOSIS — H90.3 BILATERAL SENSORINEURAL HEARING LOSS: Primary | ICD-10-CM

## 2019-02-22 NOTE — Clinical Note
Thanks for the referral. See my results and recommendations below. See the note for the full report. 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 There is no previous hearing test available for comparison. This will serve as a baseline for future comparison.Rec: Patient continues to be a good candidate for amplification at this time- full time use of hearing aids is recommended. Follow up with hearing aid clinic for adjustments. It is recommended that patient follow up with ENT regarding asymmetry found in hearing thresholds and word recognition. Patient should return for testing based on physician protocol and recommendation. Breezy Garcia.Licensed [place]ologist[place] # [id number]

## 2019-02-22 NOTE — PROGRESS NOTES
AUDIOLOGY REPORT    SUBJECTIVE:  Dimple Oviedo is a 85 year old female who was seen in the Audiology Clinic at the [facility] for audiologic evaluation, referred by Ursula Lee PA-C.  Her history is significant for chemotherapy and blood infusion treatments for bladder cancer. She has recently completed four rounds of carboplatin/gemcitabine.The patient reports known hearing loss bilaterally, and utilizes binaural amplification, fit elsewhere. She reports a decrease in hearing and notes an echo sound quality since beginning chemotherapy treatments. She also reports having dizziness since starting chemotherapy. The patient denies aural pain, drainage, tinnitus, significant noise exposure, and previous ear surgeries. She notes difficulty with communication in a variety of listening situations. Dimple was accompanied today by her daughter.    OBJECTIVE:  Otoscopic exam indicated partial obstruction with cerumen bilaterally     Pure Tone Thresholds assessed using conventional audiometry with good, reliability from 250-8000 Hz bilaterally using insert earphones and circumaural headphones     RIGHT:  moderate sloping to severe sensorineural hearing loss. Air bone gap noted at 250 Hz (possible vibrotactile response)    LEFT:   mild sloping to moderate-severe sensorineural hearing loss. Air bone gap noted at 4 kHz  Asymmetry noted 250-500 Hz, with the right ear poorer    High frequency audiometry from 9,000-20,000 Hz was performed and was in the profound to unmeasurable hearing loss range, with no aged normative data for comparison     Distortion product otoacoustic emissions were performed from 1.5-10 kHz and were absent bilaterally.      Tympanogram:    RIGHT: normal eardrum mobility with slight negative pressure    LEFT:  normal eardrum mobility    Reflexes (reported by stimulus ear):  RIGHT: Ipsilateral: present at elevated levels  RIGHT: Contralateral: present at elevated  levels  LEFT:   Ipsilateral: present at elevated levels  LEFT:   Contralateral: present at elevated levels      Speech Reception Threshold:    RIGHT: 55 dB HL    LEFT:   40 dB HL  Word Recognition Score:     RIGHT: 96% at 80 dB HL using NU-6 recorded word list.    LEFT:   64% at 80 dB HL using NU-6 recorded word list.      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 There is no previous hearing test available for comparison. This will serve as a baseline for future comparison. Today s results were discussed with the patient in detail.     PLAN:  Patient was counseled regarding hearing loss and impact on communication. Patient continues to be a good candidate for amplification at this time- full time use of hearing aids is recommended. Follow up with hearing aid clinic for adjustments. It is recommended that patient follow up with ENT regarding asymmetry found in hearing thresholds and word recognition. Patient should return for testing based on physician protocol and recommendation.  Please call this clinic with questions regarding these results or recommendations.      Breezy Garcia.  Licensed Audiologist  [place] # [id number]

## 2019-02-22 NOTE — PROGRESS NOTES
Paged Dr. Toledo to find out what he would like to do about Dimple taking off her Neulasta On-Pro during the middle of the injection. It is not known how much of the drug was delivered.     TC from Dr. Toledo who stated that pt will need to get another full dose of Neulasta. If this is not an option, then pt is to get Neupogen 480 mcg for the next two days (02/23/2019 & 02/24/2019). Follow-up labs are not needed.

## 2019-02-22 NOTE — PROGRESS NOTES
Dimple came in earlier today because she had taken off her Neulasta Onpro Kit prior to the full medication delivery. Dr. Toledo would like her to get another dose of Neulasta; I called to let her know she is scheduled for tomorrow, 2/23 at 10am.

## 2019-02-23 ENCOUNTER — INFUSION THERAPY VISIT (OUTPATIENT)
Dept: ONCOLOGY | Facility: CLINIC | Age: 84
End: 2019-02-23
Attending: INTERNAL MEDICINE
Payer: MEDICARE

## 2019-02-23 VITALS
RESPIRATION RATE: 16 BRPM | OXYGEN SATURATION: 97 % | HEART RATE: 63 BPM | DIASTOLIC BLOOD PRESSURE: 75 MMHG | TEMPERATURE: 98.1 F | SYSTOLIC BLOOD PRESSURE: 145 MMHG

## 2019-02-23 DIAGNOSIS — C66.1 UROTHELIAL CARCINOMA OF RIGHT DISTAL URETER (H): ICD-10-CM

## 2019-02-23 DIAGNOSIS — Z51.11 ENCOUNTER FOR ANTINEOPLASTIC CHEMOTHERAPY: Primary | ICD-10-CM

## 2019-02-23 DIAGNOSIS — C68.9 UROTHELIAL CARCINOMA (H): ICD-10-CM

## 2019-02-23 PROCEDURE — 25000128 H RX IP 250 OP 636: Mod: ZF | Performed by: INTERNAL MEDICINE

## 2019-02-23 PROCEDURE — 96372 THER/PROPH/DIAG INJ SC/IM: CPT

## 2019-02-23 RX ADMIN — PEGFILGRASTIM 6 MG: 6 INJECTION SUBCUTANEOUS at 10:12

## 2019-02-23 ASSESSMENT — PAIN SCALES - GENERAL: PAINLEVEL: NO PAIN (0)

## 2019-02-23 NOTE — PROGRESS NOTES
Infusion Nursing Note:  Dimple Oviedo presents today for Neulasta injection.    Patient seen by provider today: No   present during visit today: Not Applicable.    Note: Dimple arrives to receive her Neulasta injection as there was an issue with her On-Pro. She offers no new complaints.     Post Infusion Assessment:  Patient tolerated injection to LEFT arm without incident.    Discharge Plan:   Patient declined prescription refills.  Patient and/or family verbalized understanding of discharge instructions and all questions answered.  AVS to patient via iodine.  Patient will return 2/27/2019 for next appointment.   Patient discharged in stable condition accompanied by: daughter.  Departure Mode: Ambulatory with walker.    Marshal Paris RN

## 2019-02-23 NOTE — PATIENT INSTRUCTIONS
[facility] Main Line: [phone]    Call triage nurse with chills and/or temperature greater than or equal to 100.4, uncontrolled nausea/vomiting, diarrhea, constipation, dizziness, shortness of breath, chest pain, bleeding, unexplained bruising, or any new/concerning symptoms, questions/concerns.     If you are having any concerning symptoms or wish to speak to a provider before your next infusion visit, please call your care coordinator or triage to notify them so we can adequately serve you.     For triage nurse, after hours, weekends, and holidays: [phone]     No complaints

## 2019-02-27 ENCOUNTER — APPOINTMENT (OUTPATIENT)
Dept: LAB | Facility: CLINIC | Age: 84
End: 2019-02-27
Attending: INTERNAL MEDICINE
Payer: MEDICARE

## 2019-02-27 ENCOUNTER — INFUSION THERAPY VISIT (OUTPATIENT)
Dept: ONCOLOGY | Facility: CLINIC | Age: 84
End: 2019-02-27
Attending: INTERNAL MEDICINE
Payer: MEDICARE

## 2019-02-27 VITALS
DIASTOLIC BLOOD PRESSURE: 57 MMHG | BODY MASS INDEX: 32.11 KG/M2 | TEMPERATURE: 98.5 F | OXYGEN SATURATION: 97 % | RESPIRATION RATE: 16 BRPM | SYSTOLIC BLOOD PRESSURE: 141 MMHG | HEART RATE: 71 BPM | WEIGHT: 148.4 LBS

## 2019-02-27 DIAGNOSIS — D50.9 IRON DEFICIENCY ANEMIA, UNSPECIFIED IRON DEFICIENCY ANEMIA TYPE: ICD-10-CM

## 2019-02-27 DIAGNOSIS — Z51.11 ENCOUNTER FOR ANTINEOPLASTIC CHEMOTHERAPY: ICD-10-CM

## 2019-02-27 DIAGNOSIS — C66.1 UROTHELIAL CARCINOMA OF RIGHT DISTAL URETER (H): Primary | ICD-10-CM

## 2019-02-27 DIAGNOSIS — C68.9 UROTHELIAL CARCINOMA (H): ICD-10-CM

## 2019-02-27 LAB
ABO + RH BLD: NORMAL
ABO + RH BLD: NORMAL
ANISOCYTOSIS BLD QL SMEAR: ABNORMAL
BASOPHILS # BLD AUTO: 0 10E9/L (ref 0–0.2)
BASOPHILS NFR BLD AUTO: 0 %
BLD GP AB SCN SERPL QL: NORMAL
BLD PROD TYP BPU: NORMAL
BLD PROD TYP BPU: NORMAL
BLD UNIT ID BPU: 0
BLOOD BANK CMNT PATIENT-IMP: NORMAL
BLOOD PRODUCT CODE: NORMAL
BPU ID: NORMAL
DIFFERENTIAL METHOD BLD: ABNORMAL
EOSINOPHIL # BLD AUTO: 0 10E9/L (ref 0–0.7)
EOSINOPHIL NFR BLD AUTO: 0 %
ERYTHROCYTE [DISTWIDTH] IN BLOOD BY AUTOMATED COUNT: 19.9 % (ref 10–15)
HCT VFR BLD AUTO: 25.9 % (ref 35–47)
HGB BLD-MCNC: 8.3 G/DL (ref 11.7–15.7)
LYMPHOCYTES # BLD AUTO: 1.1 10E9/L (ref 0.8–5.3)
LYMPHOCYTES NFR BLD AUTO: 6.1 %
MCH RBC QN AUTO: 29.5 PG (ref 26.5–33)
MCHC RBC AUTO-ENTMCNC: 32 G/DL (ref 31.5–36.5)
MCV RBC AUTO: 92 FL (ref 78–100)
MONOCYTES # BLD AUTO: 1.8 10E9/L (ref 0–1.3)
MONOCYTES NFR BLD AUTO: 9.6 %
NEUTROPHILS # BLD AUTO: 15.6 10E9/L (ref 1.6–8.3)
NEUTROPHILS NFR BLD AUTO: 84.3 %
NUM BPU REQUESTED: 1
PLATELET # BLD AUTO: 29 10E9/L (ref 150–450)
PLATELET # BLD EST: ABNORMAL 10*3/UL
POIKILOCYTOSIS BLD QL SMEAR: SLIGHT
RBC # BLD AUTO: 2.81 10E12/L (ref 3.8–5.2)
SPECIMEN EXP DATE BLD: NORMAL
TRANSFUSION STATUS PATIENT QL: NORMAL
TRANSFUSION STATUS PATIENT QL: NORMAL
WBC # BLD AUTO: 18.5 10E9/L (ref 4–11)

## 2019-02-27 PROCEDURE — 85025 COMPLETE CBC W/AUTO DIFF WBC: CPT | Performed by: INTERNAL MEDICINE

## 2019-02-27 PROCEDURE — 86923 COMPATIBILITY TEST ELECTRIC: CPT | Performed by: PHYSICIAN ASSISTANT

## 2019-02-27 PROCEDURE — 86901 BLOOD TYPING SEROLOGIC RH(D): CPT | Performed by: PHYSICIAN ASSISTANT

## 2019-02-27 PROCEDURE — 96361 HYDRATE IV INFUSION ADD-ON: CPT

## 2019-02-27 PROCEDURE — 25000128 H RX IP 250 OP 636: Mod: ZF | Performed by: INTERNAL MEDICINE

## 2019-02-27 PROCEDURE — 86850 RBC ANTIBODY SCREEN: CPT | Performed by: PHYSICIAN ASSISTANT

## 2019-02-27 PROCEDURE — 96360 HYDRATION IV INFUSION INIT: CPT

## 2019-02-27 PROCEDURE — 86900 BLOOD TYPING SEROLOGIC ABO: CPT | Performed by: PHYSICIAN ASSISTANT

## 2019-02-27 RX ORDER — HEPARIN SODIUM (PORCINE) LOCK FLUSH IV SOLN 100 UNIT/ML 100 UNIT/ML
5 SOLUTION INTRAVENOUS ONCE
Status: COMPLETED | OUTPATIENT
Start: 2019-02-27 | End: 2019-02-27

## 2019-02-27 RX ADMIN — HEPARIN 5 ML: 100 SYRINGE at 13:29

## 2019-02-27 RX ADMIN — SODIUM CHLORIDE 1000 ML: 9 INJECTION, SOLUTION INTRAVENOUS at 11:44

## 2019-02-27 RX ADMIN — HEPARIN SODIUM (PORCINE) LOCK FLUSH IV SOLN 100 UNIT/ML 5 ML: 100 SOLUTION at 10:52

## 2019-02-27 ASSESSMENT — PAIN SCALES - GENERAL: PAINLEVEL: NO PAIN (0)

## 2019-02-27 NOTE — NURSING NOTE
Chief Complaint   Patient presents with     Port Draw     Labs drawn via port by RN in lab. VS taken. Pt checked in for next appt     Port accessed with 20g gripper needle by RN, labs collected, line flushed with saline and heparin.  Vitals taken. Pt checked in for appointment(s).    Idalia VALERO RN PHN BSN  BMT/Oncology Lab

## 2019-02-27 NOTE — PROGRESS NOTES
Infusion Nursing Note:  Dimple Oviedo presents today for IV fluids.    Patient seen by provider today: No   present during visit today: Not Applicable.    Note: Patient presents to clinic today, feeling OK. She reports that she has had some generalized fatigue, it has not improved or worsened from last week. She denies any gross hematuria or bleeding; however, when she sat down for infusion a small amount of blood was noted dripping from her nose, which clotted quickly. Platelets are 29 today. ALYSSA Cooley notified.     ALYSSA Chirinos/Adeline Reid RN- Pt does not require blood or platelet transfusion today. Have pt repeat labs on Friday prior to CT scans, and monitor for bleeding.     Intravenous Access:  Implanted Port.    Treatment Conditions:  Lab Results   Component Value Date    HGB 8.3 02/27/2019     Lab Results   Component Value Date    WBC 18.5 02/27/2019      Lab Results   Component Value Date    ANEU 15.6 02/27/2019     Lab Results   Component Value Date    PLT 29 02/27/2019      Results reviewed, labs MET treatment parameters, ok to proceed with treatment.      Post Infusion Assessment:  Patient tolerated infusion without incident.  Blood return noted pre and post infusion.  Site patent and intact, free from redness, edema or discomfort.  No evidence of extravasations.  Access discontinued per protocol.    Discharge Plan:   Patient declined prescription refills.  Discharge instructions reviewed with: Patient and Family.  Patient and/or family verbalized understanding of discharge instructions and all questions answered.  AVS to patient via [employer].  Patient will return 2/6/2019 for next labs and MD appointment.   Patient discharged in stable condition accompanied by: daughter.  Departure Mode: Ambulatory.    Adeline Reid RN

## 2019-02-28 NOTE — PROGRESS NOTES
Patient is followed by Oncology staff . No further clinic CC needed     Omayra Gatica RN / Clinical Care Coordinator     [facility]   [email or web address]  Office :  [phone] / Fax :  [phone]

## 2019-03-01 ENCOUNTER — TELEPHONE (OUTPATIENT)
Dept: ONCOLOGY | Facility: CLINIC | Age: 84
End: 2019-03-01

## 2019-03-01 ENCOUNTER — ANCILLARY PROCEDURE (OUTPATIENT)
Dept: CT IMAGING | Facility: CLINIC | Age: 84
End: 2019-03-01
Attending: INTERNAL MEDICINE
Payer: MEDICARE

## 2019-03-01 ENCOUNTER — PATIENT OUTREACH (OUTPATIENT)
Dept: ONCOLOGY | Facility: CLINIC | Age: 84
End: 2019-03-01

## 2019-03-01 DIAGNOSIS — C66.1 UROTHELIAL CARCINOMA OF RIGHT DISTAL URETER (H): ICD-10-CM

## 2019-03-01 LAB
BASOPHILS # BLD AUTO: 0.1 10E9/L (ref 0–0.2)
BASOPHILS NFR BLD AUTO: 0.4 %
DIFFERENTIAL METHOD BLD: ABNORMAL
EOSINOPHIL # BLD AUTO: 0 10E9/L (ref 0–0.7)
EOSINOPHIL NFR BLD AUTO: 0.2 %
ERYTHROCYTE [DISTWIDTH] IN BLOOD BY AUTOMATED COUNT: 20.3 % (ref 10–15)
HCT VFR BLD AUTO: 24.8 % (ref 35–47)
HGB BLD-MCNC: 8 G/DL (ref 11.7–15.7)
IMM GRANULOCYTES # BLD: 0.8 10E9/L (ref 0–0.4)
IMM GRANULOCYTES NFR BLD: 4.4 %
LYMPHOCYTES # BLD AUTO: 1.7 10E9/L (ref 0.8–5.3)
LYMPHOCYTES NFR BLD AUTO: 9.6 %
MCH RBC QN AUTO: 29.7 PG (ref 26.5–33)
MCHC RBC AUTO-ENTMCNC: 32.3 G/DL (ref 31.5–36.5)
MCV RBC AUTO: 92 FL (ref 78–100)
MONOCYTES # BLD AUTO: 1.8 10E9/L (ref 0–1.3)
MONOCYTES NFR BLD AUTO: 10.4 %
NEUTROPHILS # BLD AUTO: 13.3 10E9/L (ref 1.6–8.3)
NEUTROPHILS NFR BLD AUTO: 75 %
NRBC # BLD AUTO: 0.1 10*3/UL
NRBC BLD AUTO-RTO: 1 /100
PLATELET # BLD AUTO: 21 10E9/L (ref 150–450)
PLATELET # BLD EST: ABNORMAL 10*3/UL
RBC # BLD AUTO: 2.69 10E12/L (ref 3.8–5.2)
WBC # BLD AUTO: 17.7 10E9/L (ref 4–11)

## 2019-03-01 PROCEDURE — 86923 COMPATIBILITY TEST ELECTRIC: CPT | Performed by: PHYSICIAN ASSISTANT

## 2019-03-01 PROCEDURE — 25000128 H RX IP 250 OP 636: Performed by: INTERNAL MEDICINE

## 2019-03-01 PROCEDURE — 86900 BLOOD TYPING SEROLOGIC ABO: CPT | Performed by: PHYSICIAN ASSISTANT

## 2019-03-01 PROCEDURE — 86850 RBC ANTIBODY SCREEN: CPT | Performed by: PHYSICIAN ASSISTANT

## 2019-03-01 PROCEDURE — 86901 BLOOD TYPING SEROLOGIC RH(D): CPT | Performed by: PHYSICIAN ASSISTANT

## 2019-03-01 PROCEDURE — 85025 COMPLETE CBC W/AUTO DIFF WBC: CPT | Performed by: PHYSICIAN ASSISTANT

## 2019-03-01 RX ORDER — HEPARIN SODIUM (PORCINE) LOCK FLUSH IV SOLN 100 UNIT/ML 100 UNIT/ML
5 SOLUTION INTRAVENOUS DAILY PRN
Status: DISCONTINUED | OUTPATIENT
Start: 2019-03-01 | End: 2019-03-09 | Stop reason: HOSPADM

## 2019-03-01 RX ORDER — HEPARIN SODIUM (PORCINE) LOCK FLUSH IV SOLN 100 UNIT/ML 100 UNIT/ML
5 SOLUTION INTRAVENOUS ONCE
Status: COMPLETED | OUTPATIENT
Start: 2019-03-01 | End: 2019-03-01

## 2019-03-01 RX ORDER — IOPAMIDOL 755 MG/ML
91 INJECTION, SOLUTION INTRAVASCULAR ONCE
Status: COMPLETED | OUTPATIENT
Start: 2019-03-01 | End: 2019-03-01

## 2019-03-01 RX ADMIN — HEPARIN 5 ML: 100 SYRINGE at 12:10

## 2019-03-01 RX ADMIN — IOPAMIDOL 91 ML: 755 INJECTION, SOLUTION INTRAVASCULAR at 13:34

## 2019-03-01 RX ADMIN — HEPARIN SODIUM (PORCINE) LOCK FLUSH IV SOLN 100 UNIT/ML 5 ML: 100 SOLUTION at 13:34

## 2019-03-01 NOTE — DISCHARGE INSTRUCTIONS

## 2019-03-01 NOTE — TELEPHONE ENCOUNTER
"Pt called in to triage reporting 3 new \"thumb size\" bruises, 2 by her left wrist where she wears her watch and 2 on her knee. Denied any injury, bumping into things, other bleeding, rashes or bruises. Labs on 2/27 showed a platelet count of 29 and had a nosebleed that day, denied any since then. She is coming in for a CT today with labs prior including a CBC w/ platelets diff. Advised her to come back to clinic after CT to view lab results. Routed to Khadra SHAH/Nicolasa SHAH.  "

## 2019-03-01 NOTE — PROGRESS NOTES
Pt was in clinic today for CT and labs. Platelets were 21,000, and Hgb was 8. Per Khadra Ellison, pt needs to be re-checked over the weekend and possibly get blood and/or platelets. Per infusion charge, tomorrow 03/02/2019 is booked and pt may come in on Sunday 03/03/2019 at 0730 for labs and possible blood/platelets. Pt had remained in the clinic as instructed after lab draw to get test results. Informed pt of lab results and Khadra's recommendation to RTC on Sunday at 0730 for labs and possible infusion. Pt stated understanding and plans to come in as planned. Pt agrees to call and notify clinic if she is unable to keep appt as planned.

## 2019-03-01 NOTE — NURSING NOTE
Chief Complaint   Patient presents with     Port Draw     Labs drawn via port by RN in lab.      Labs drawn via Port accessed using 20g flat needle. Line flushed and Heparin locked. Vital signs taken. Checked into next appointment.       Marilyn England RN

## 2019-03-02 LAB
ABO + RH BLD: NORMAL
ABO + RH BLD: NORMAL
BLD GP AB SCN SERPL QL: NORMAL
BLD PROD TYP BPU: NORMAL
BLOOD BANK CMNT PATIENT-IMP: NORMAL
NUM BPU REQUESTED: 1
SPECIMEN EXP DATE BLD: NORMAL

## 2019-03-03 ENCOUNTER — INFUSION THERAPY VISIT (OUTPATIENT)
Dept: ONCOLOGY | Facility: CLINIC | Age: 84
End: 2019-03-03
Attending: INTERNAL MEDICINE
Payer: MEDICARE

## 2019-03-03 VITALS
HEART RATE: 61 BPM | RESPIRATION RATE: 16 BRPM | SYSTOLIC BLOOD PRESSURE: 163 MMHG | TEMPERATURE: 98.7 F | OXYGEN SATURATION: 99 % | DIASTOLIC BLOOD PRESSURE: 73 MMHG

## 2019-03-03 DIAGNOSIS — C66.1 UROTHELIAL CARCINOMA OF RIGHT DISTAL URETER (H): Primary | ICD-10-CM

## 2019-03-03 DIAGNOSIS — D50.9 IRON DEFICIENCY ANEMIA, UNSPECIFIED IRON DEFICIENCY ANEMIA TYPE: ICD-10-CM

## 2019-03-03 DIAGNOSIS — E05.00 GRAVES DISEASE: ICD-10-CM

## 2019-03-03 LAB
BASOPHILS # BLD AUTO: 0 10E9/L (ref 0–0.2)
BASOPHILS NFR BLD AUTO: 0.3 %
BLD PROD TYP BPU: NORMAL
BLD PROD TYP BPU: NORMAL
BLD UNIT ID BPU: 0
BLD UNIT ID BPU: 0
BLOOD PRODUCT CODE: NORMAL
BLOOD PRODUCT CODE: NORMAL
BPU ID: NORMAL
BPU ID: NORMAL
DIFFERENTIAL METHOD BLD: ABNORMAL
EOSINOPHIL # BLD AUTO: 0 10E9/L (ref 0–0.7)
EOSINOPHIL NFR BLD AUTO: 0.1 %
ERYTHROCYTE [DISTWIDTH] IN BLOOD BY AUTOMATED COUNT: 22.2 % (ref 10–15)
HCT VFR BLD AUTO: 24.5 % (ref 35–47)
HGB BLD-MCNC: 7.7 G/DL (ref 11.7–15.7)
IMM GRANULOCYTES # BLD: 0.5 10E9/L (ref 0–0.4)
IMM GRANULOCYTES NFR BLD: 4.1 %
LYMPHOCYTES # BLD AUTO: 0.9 10E9/L (ref 0.8–5.3)
LYMPHOCYTES NFR BLD AUTO: 7.4 %
MCH RBC QN AUTO: 30 PG (ref 26.5–33)
MCHC RBC AUTO-ENTMCNC: 31.4 G/DL (ref 31.5–36.5)
MCV RBC AUTO: 95 FL (ref 78–100)
MONOCYTES # BLD AUTO: 1 10E9/L (ref 0–1.3)
MONOCYTES NFR BLD AUTO: 8.7 %
NEUTROPHILS # BLD AUTO: 9.4 10E9/L (ref 1.6–8.3)
NEUTROPHILS NFR BLD AUTO: 79.4 %
NRBC # BLD AUTO: 0 10*3/UL
NRBC BLD AUTO-RTO: 0 /100
PLATELET # BLD AUTO: 26 10E9/L (ref 150–450)
RBC # BLD AUTO: 2.57 10E12/L (ref 3.8–5.2)
T3 SERPL-MCNC: 109 NG/DL (ref 60–181)
T4 FREE SERPL-MCNC: 1.16 NG/DL (ref 0.76–1.46)
TRANSFUSION STATUS PATIENT QL: NORMAL
TSH SERPL DL<=0.005 MIU/L-ACNC: 0.01 MU/L (ref 0.4–4)
WBC # BLD AUTO: 11.9 10E9/L (ref 4–11)

## 2019-03-03 PROCEDURE — 85025 COMPLETE CBC W/AUTO DIFF WBC: CPT | Performed by: INTERNAL MEDICINE

## 2019-03-03 PROCEDURE — 25000128 H RX IP 250 OP 636: Mod: ZF | Performed by: INTERNAL MEDICINE

## 2019-03-03 PROCEDURE — 84443 ASSAY THYROID STIM HORMONE: CPT | Performed by: INTERNAL MEDICINE

## 2019-03-03 PROCEDURE — 84439 ASSAY OF FREE THYROXINE: CPT | Performed by: INTERNAL MEDICINE

## 2019-03-03 PROCEDURE — 84480 ASSAY TRIIODOTHYRONINE (T3): CPT | Performed by: INTERNAL MEDICINE

## 2019-03-03 PROCEDURE — 36430 TRANSFUSION BLD/BLD COMPNT: CPT

## 2019-03-03 PROCEDURE — P9016 RBC LEUKOCYTES REDUCED: HCPCS | Performed by: PHYSICIAN ASSISTANT

## 2019-03-03 RX ORDER — HEPARIN SODIUM (PORCINE) LOCK FLUSH IV SOLN 100 UNIT/ML 100 UNIT/ML
5 SOLUTION INTRAVENOUS ONCE
Status: COMPLETED | OUTPATIENT
Start: 2019-03-03 | End: 2019-03-03

## 2019-03-03 RX ADMIN — HEPARIN 5 ML: 100 SYRINGE at 11:36

## 2019-03-03 ASSESSMENT — PAIN SCALES - GENERAL: PAINLEVEL: NO PAIN (0)

## 2019-03-03 NOTE — PROGRESS NOTES
Infusion Nursing Note:  Dimple Oviedo presents today for 1 unit PRBC.    Patient seen by provider today: No   present during visit today: N/A    Note: Patient presents to clinic complaining of some fatigue. Pt will receive 1 unit PRBC today for Hgb <8, pt does not meet parameters for platelets as platelet count is >20.    Intravenous Access:  Implanted Port.    Treatment Conditions:  Lab Results   Component Value Date    HGB 7.7 03/03/2019     Lab Results   Component Value Date    WBC 11.9 03/03/2019      Lab Results   Component Value Date    ANEU 9.4 03/03/2019     Lab Results   Component Value Date    PLT 26 03/03/2019      Results reviewed, labs MET treatment parameters, ok to proceed with treatment.  Consent signed 2/20/2019.      Post Infusion Assessment:  Patient tolerated infusion without incident.  Blood return noted pre and post infusion.  Site patent and intact, free from redness, edema or discomfort.  No evidence of extravasations.  Access discontinued per protocol.    Discharge Plan:   Patient declined prescription refills.  Discharge instructions reviewed with: Patient and Family.  Patient and/or family verbalized understanding of discharge instructions and all questions answered.  AVS to patient via [employer].  Patient will return 3/6/2019 for next MD appointment.   Patient discharged in stable condition accompanied by: son.  Departure Mode: Ambulatory.    Adeline Reid RN

## 2019-03-03 NOTE — PATIENT INSTRUCTIONS
Contact Numbers    [facility] [place]: [phone]  [facility] Triage and after hours / weekends / holidays:  [phone]      Please call the triage or after hours line if you experience a temperature greater than or equal to 100.5, shaking chills, have uncontrolled nausea, vomiting and/or diarrhea, dizziness, shortness of breath, chest pain, bleeding, unexplained bruising, or if you have any other new/concerning symptoms, questions or concerns.      If you are having any concerning symptoms or wish to speak to a provider before your next infusion visit, please call your care coordinator or triage to notify them so we can adequately serve you.     If you need a refill on a narcotic prescription or other medication, please call before your infusion appointment.

## 2019-03-06 ENCOUNTER — APPOINTMENT (OUTPATIENT)
Dept: LAB | Facility: CLINIC | Age: 84
End: 2019-03-06
Attending: INTERNAL MEDICINE
Payer: MEDICARE

## 2019-03-06 ENCOUNTER — ONCOLOGY VISIT (OUTPATIENT)
Dept: ONCOLOGY | Facility: CLINIC | Age: 84
End: 2019-03-06
Attending: INTERNAL MEDICINE
Payer: MEDICARE

## 2019-03-06 VITALS
RESPIRATION RATE: 16 BRPM | BODY MASS INDEX: 31.28 KG/M2 | OXYGEN SATURATION: 95 % | DIASTOLIC BLOOD PRESSURE: 69 MMHG | WEIGHT: 145 LBS | TEMPERATURE: 98.1 F | SYSTOLIC BLOOD PRESSURE: 155 MMHG | HEART RATE: 72 BPM | HEIGHT: 57 IN

## 2019-03-06 DIAGNOSIS — C68.9 UROTHELIAL CARCINOMA (H): ICD-10-CM

## 2019-03-06 DIAGNOSIS — D70.1 CHEMOTHERAPY-INDUCED NEUTROPENIA (H): ICD-10-CM

## 2019-03-06 DIAGNOSIS — Z51.11 ENCOUNTER FOR ANTINEOPLASTIC CHEMOTHERAPY: ICD-10-CM

## 2019-03-06 DIAGNOSIS — T45.1X5A CHEMOTHERAPY-INDUCED NEUTROPENIA (H): ICD-10-CM

## 2019-03-06 DIAGNOSIS — C66.1 UROTHELIAL CARCINOMA OF RIGHT DISTAL URETER (H): Primary | ICD-10-CM

## 2019-03-06 LAB
ALBUMIN SERPL-MCNC: 3.3 G/DL (ref 3.4–5)
ALP SERPL-CCNC: 127 U/L (ref 40–150)
ALT SERPL W P-5'-P-CCNC: 20 U/L (ref 0–50)
ANION GAP SERPL CALCULATED.3IONS-SCNC: 6 MMOL/L (ref 3–14)
AST SERPL W P-5'-P-CCNC: 19 U/L (ref 0–45)
BASOPHILS # BLD AUTO: 0.1 10E9/L (ref 0–0.2)
BASOPHILS NFR BLD AUTO: 0.5 %
BILIRUB SERPL-MCNC: 0.6 MG/DL (ref 0.2–1.3)
BUN SERPL-MCNC: 26 MG/DL (ref 7–30)
CALCIUM SERPL-MCNC: 8.8 MG/DL (ref 8.5–10.1)
CHLORIDE SERPL-SCNC: 100 MMOL/L (ref 94–109)
CO2 SERPL-SCNC: 25 MMOL/L (ref 20–32)
CREAT SERPL-MCNC: 1.02 MG/DL (ref 0.52–1.04)
DIFFERENTIAL METHOD BLD: ABNORMAL
EOSINOPHIL # BLD AUTO: 0 10E9/L (ref 0–0.7)
EOSINOPHIL NFR BLD AUTO: 0.2 %
ERYTHROCYTE [DISTWIDTH] IN BLOOD BY AUTOMATED COUNT: 22 % (ref 10–15)
GFR SERPL CREATININE-BSD FRML MDRD: 50 ML/MIN/{1.73_M2}
GLUCOSE SERPL-MCNC: 116 MG/DL (ref 70–99)
HCT VFR BLD AUTO: 28.9 % (ref 35–47)
HGB BLD-MCNC: 9.2 G/DL (ref 11.7–15.7)
IMM GRANULOCYTES # BLD: 0.2 10E9/L (ref 0–0.4)
IMM GRANULOCYTES NFR BLD: 1.8 %
LYMPHOCYTES # BLD AUTO: 0.9 10E9/L (ref 0.8–5.3)
LYMPHOCYTES NFR BLD AUTO: 8.9 %
MCH RBC QN AUTO: 30.2 PG (ref 26.5–33)
MCHC RBC AUTO-ENTMCNC: 31.8 G/DL (ref 31.5–36.5)
MCV RBC AUTO: 95 FL (ref 78–100)
MONOCYTES # BLD AUTO: 1.1 10E9/L (ref 0–1.3)
MONOCYTES NFR BLD AUTO: 10.3 %
NEUTROPHILS # BLD AUTO: 8.2 10E9/L (ref 1.6–8.3)
NEUTROPHILS NFR BLD AUTO: 78.3 %
NRBC # BLD AUTO: 0 10*3/UL
NRBC BLD AUTO-RTO: 0 /100
PLATELET # BLD AUTO: 48 10E9/L (ref 150–450)
POTASSIUM SERPL-SCNC: 4.6 MMOL/L (ref 3.4–5.3)
PROT SERPL-MCNC: 7.2 G/DL (ref 6.8–8.8)
RBC # BLD AUTO: 3.05 10E12/L (ref 3.8–5.2)
SODIUM SERPL-SCNC: 131 MMOL/L (ref 133–144)
WBC # BLD AUTO: 10.5 10E9/L (ref 4–11)

## 2019-03-06 PROCEDURE — 85025 COMPLETE CBC W/AUTO DIFF WBC: CPT | Performed by: INTERNAL MEDICINE

## 2019-03-06 PROCEDURE — 80053 COMPREHEN METABOLIC PANEL: CPT | Performed by: INTERNAL MEDICINE

## 2019-03-06 PROCEDURE — G0463 HOSPITAL OUTPT CLINIC VISIT: HCPCS | Mod: ZF

## 2019-03-06 PROCEDURE — 99214 OFFICE O/P EST MOD 30 MIN: CPT | Mod: ZP | Performed by: INTERNAL MEDICINE

## 2019-03-06 PROCEDURE — 36591 DRAW BLOOD OFF VENOUS DEVICE: CPT

## 2019-03-06 PROCEDURE — 25000128 H RX IP 250 OP 636: Mod: ZF | Performed by: INTERNAL MEDICINE

## 2019-03-06 RX ORDER — HEPARIN SODIUM (PORCINE) LOCK FLUSH IV SOLN 100 UNIT/ML 100 UNIT/ML
5 SOLUTION INTRAVENOUS EVERY 8 HOURS
Status: DISCONTINUED | OUTPATIENT
Start: 2019-03-06 | End: 2019-03-06 | Stop reason: HOSPADM

## 2019-03-06 RX ADMIN — HEPARIN SODIUM (PORCINE) LOCK FLUSH IV SOLN 100 UNIT/ML 5 ML: 100 SOLUTION at 10:19

## 2019-03-06 ASSESSMENT — PAIN SCALES - GENERAL: PAINLEVEL: NO PAIN (0)

## 2019-03-06 ASSESSMENT — MIFFLIN-ST. JEOR: SCORE: 976.72

## 2019-03-06 NOTE — NURSING NOTE
Chief Complaint   Patient presents with     Port Draw     accessed with power needle, heparin locked, vitals checked     Oncology Clinic Visit     Return visit related to [facility] Renal Cancer     Port Flush     Port de-accessed by RN in lab.     Port de-accessed by Rn in lab.

## 2019-03-06 NOTE — NURSING NOTE
Chief Complaint   Patient presents with     Port Draw     accessed with power needle, heparin locked, vitals checked     Evie Mckee RN on 3/6/2019 at 10:21 AM

## 2019-03-06 NOTE — PROGRESS NOTES
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    HISTORY OF PRESENT ILLNESS:  Ms. 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 Family accompanies her for the visit.    Ms. Oviedo had no further ER visits since last seen and does not report any new or uncontrolled cardiac issues. Recovering well from cycle 4 of chemotherapy. No residual side effects except for moderate fatigue. No clinical evidence of disease progression.    ONCOLOGIC HISTORY:  1. 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     Jan 2018 to September 2018- Persistent gross hematuria and underwent multiple procedures.      2/19/18 and 4/3/18- cystoscopies with bladder biopsies  which were negative for bladder tumor    1/17/18, 3/8/18, 7/26/18, 9/10/18- Multiple urine cytologies have come back positive by FISH for high-grade transitional cell carcinoma    9/10/18- Underwent a bilateral cystoureteroscopy with biopsy and brushing that showed  right upper tract cytology suspicious for high-grade urothelial ca. Right ureter brushing negative from that day. Left upper tract negative.    9/10/18- CT A/P without contrast showed new small bilateral pleural effusions and interstitial pulmonary edema but no evidence of locoregional or metastatic disease.    9/12/18- Presented to ED with oliguria, CRESENCIO, and mild hydronephrosis bilaterally on CT scan and underwent bilateral ureteral stent placment    11/13/2018- Right robotic nephroureterectomy, excision of sandip-caval mass and LN excision by Stuart King. Pathology showed \"Right nephroureterectomy: Invasive high grade " "urothelial carcinoma measuring 3.5 cm, located in renal pelvis and invading through renal parenchyma into perinephric fat. Urothelial carcinoma in-situ present. Margins free of tumor. Ana-caval mass: Metastatic urothelial carcinoma involving one lymph node with at least 1 cm tumor deposit. Pericaval Extranodal Extension present. Five additional benign pericaval lymph nodes. Pathologic stage: pT4N1 (1 of 6 lymph nodes).\"    12/11/18- PET/CT whole body demonstrated significant residual FDG avid disease. For example, \"there is an FDG avid ill-defined pericaval mass immediately medial to the surgical clips measuring approximately 2.0 x 1.4 cm, with max SUV measuring up to12.2, see series 4 image 21. Additional FDG avid retrocaval and interaortocaval lymphadenopathy are noted.There is a 1.2 cm nodule in the right hemipelvis posterior lateral tothe bladder with max SUV measuring 8.3, see series 4 image 77. The bladder is incompletely distended.\" No suspicious thoracic or bone uptake.    12/12/18- Started adjuvant chemotherapy (carbo+gem) per POUT trial. Cycle 2 - 1/2/19. Cycle 3 - 1/23/19. Cycle 4 - 02/13/19.    1/22/19 - CT C/A/P with contrast compared with prior PET/CT: \"1. New well-circumscribed soft tissue pulmonary nodules of the right lower lobe and left upper lobe. Findings could be infectious, inflammatory, or represent metastatic disease. Continued attention on follow-up recommended. Postoperative changes of right nephrectomy. No evidence for local recurrence in the present study.\"    3/1/2019- CT C/A/P with contrast - \"1. Bilateral pulmonary nodules measuring up to 4 mm in the right lower lobe, previously measuring 8 mm. No new or enlarging pulmonary nodules. 2. No convincing evidence for metastatic disease in the abdomen, pelvis and bones.\"    REVIEW OF SYSTEMS: 14 point ROS negative other than the symptoms noted above in the HPI.    PAST MEDICAL AND SURGICAL HISTORY:   Past Medical History:   Diagnosis Date "     Calculus of kidney      Chemotherapy-induced neutropenia (H) 3/6/2019     Esophageal reflux      GERD (gastroesophageal reflux disease)      Hyperlipidemia LDL goal <130 5/9/2010     Malignant melanoma of skin of trunk, except scrotum (H)      Nonspecific abnormal finding     has living will 2004 -      Nontoxic multinodular goiter     no further eval /tx rec per pt     Osteopenia      Other psoriasis      Personal history of colonic polyps      PMR (polymyalgia rheumatica) (H)      Stress-induced cardiomyopathy      Undiagnosed cardiac murmurs      Unspecified constipation      Unspecified essential hypertension      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     Past Surgical History:   Procedure Laterality Date     BIOPSY       C NONSPECIFIC PROCEDURE  2005    colonoscopy polyp repeat 2010     COLONOSCOPY  2014     COMBINED CYSTOSCOPY, INSERT STENT URETER(S) Bilateral 9/12/2018    Procedure: COMBINED CYSTOSCOPY, INSERT STENT URETER(S);  Cystoscopy Bilateral ureteral Stent Placement.;  Surgeon: Justin Henry MD;  [facility]: [facility]     COMBINED CYSTOSCOPY, RETROGRADES, URETEROSCOPY, INSERT STENT Bilateral 4/3/2018    Procedure: COMBINED CYSTOSCOPY, RETROGRADES, URETEROSCOPY, INSERT STENT;;  Surgeon: Stuart King MD;  Location: [facility]     COMBINED CYSTOSCOPY, RETROGRADES, URETEROSCOPY, INSERT STENT Bilateral 9/10/2018    Procedure: COMBINED CYSTOSCOPY, RETROGRADES, URETEROSCOPY, INSERT STENT;  Cystoscopy, Bilateral Ureteroscopy, Bladder Biopsies, Retrogram Pyelograms, Ureteral Washings and brushings, cysview;  Surgeon: Stuart King MD;  [facility]     CYSTOSCOPY, BIOPSY BLADDER INSTILL OPTICAL AGENT N/A 4/3/2018    Procedure: CYSTOSCOPY, BIOPSY BLADDER INSTILL OPTICAL AGENT;  Cystoscopy, Blue Light Cystoscopy, Bladder Biopsies, Bilateral Selective ureteral washings for Cytology, Bilateral Retrograde Pyelograms, Bilateral Ureteroscopy;  Surgeon: Stuart King MD;  Location:  [facility]     CYSTOSCOPY, BIOPSY BLADDER, COMBINED N/A 2/19/2018    Procedure: COMBINED CYSTOSCOPY, BIOPSY BLADDER;  Cystoscopy, Bladder Biopsy;  Surgeon: Kenna La MD;  [facility]: [facility]     CYSTOSCOPY, REMOVE STENT(S), COMBINED  11/13/2018    Procedure: Flexible Cystoscopy with Stent Removal;  Surgeon: Stuart King MD;  [facility]: [facility]     DAVINCI LYMPHADENECTOMY N/A 11/13/2018    Procedure: Davinci Lymphadenectomy ;  Surgeon: Stuart King MD;  [facility]     DAVINCI NEPHROURETERECTOMY N/A 11/13/2018    Procedure: Right DaVinci Assisted Nephroureterectomy;  Surgeon: Stuart King MD;  [facility]     ENDOSCOPIC ULTRASOUND LOWER GASTROINTESTIONAL TRACT (GI) N/A 10/30/2015    Procedure: ENDOSCOPIC ULTRASOUND LOWER GASTROINTESTIONAL TRACT (GI);  Surgeon: Daniel Jean-Baptiste MD;  [facility]     EYE SURGERY  12/4/17     INSERT PORT VASCULAR ACCESS Right 12/19/2018    Procedure: Chest Port Placement - right;  Surgeon: Stuart Chavez PA-C;  [facility]     IR CHEST PORT PLACEMENT > 5 YRS OF AGE  12/19/2018     LAPAROSCOPIC CHOLECYSTECTOMY WITH CHOLANGIOGRAMS N/A 11/1/2015    Procedure: LAPAROSCOPIC CHOLECYSTECTOMY WITH CHOLANGIOGRAMS;  Surgeon: Tonie Warren MD;  [facility]     SURGICAL HISTORY OF -   1996    malignant melanoma     SURGICAL HISTORY OF -   1968    thyroid nodule     SURGICAL HISTORY OF -       D & C     SOCIAL HISTORY:   Social History     Tobacco Use     Smoking status: Never Smoker     Smokeless tobacco: Never Used   Substance Use Topics     Alcohol use: No     Alcohol/week: 0.0 oz     Drug use: No     FAMILY HISTORY:   Family History   Problem Relation Age of Onset     Cancer Father         dec - esophageal and laryngeal     Heart Disease Mother      Respiratory Mother         dec     Breast Cancer Daughter      Other Cancer Daughter      Thyroid Disease Daughter      Asthma Daughter      Hyperlipidemia Son       "Diabetes Son      ALLERGIES:   Allergies   Allergen Reactions     Codeine      CURRENT MEDICATIONS:   Current Outpatient Medications:      acetaminophen (TYLENOL) 650 MG CR tablet, Take 1 tablet (650 mg) by mouth every 8 hours as needed for pain, Disp: 100 tablet, Rfl: 0     alendronate (FOSAMAX) 70 MG tablet, TAKE 1 TABLET EVERY 7 DAYS AT LEAST 60 MIN BEFORE BREAKFAST AS DIRECTED \"SEE PACKAGE FOR ADDITIONAL INSTRUCTIONS\", Disp: 12 tablet, Rfl: 1     aspirin 81 MG tablet, Take 81 mg by mouth every evening , Disp: , Rfl:      Calcium Citrate-Vitamin D (CALCIUM + D PO), Take 1 tablet by mouth 2 times daily , Disp: , Rfl:      cycloSPORINE (RESTASIS) 0.05 % ophthalmic emulsion, Place 1 drop into both eyes 2 times daily, Disp: , Rfl:      ferrous sulfate 325 (65 Fe) MG TBEC EC tablet, Take 325 mg by mouth daily, Disp: , Rfl:      furosemide (LASIX) 20 MG tablet, TAKE 1 TABLET (20 MG) BY MOUTH EVERY MORNING, Disp: 30 tablet, Rfl: 0     irbesartan (AVAPRO) 300 MG tablet, TAKE 1 TABLET EVERY DAY, Disp: 90 tablet, Rfl: 3     lidocaine-prilocaine (EMLA) 2.5-2.5 % external cream, Apply topically as needed for moderate pain, Disp: 30 g, Rfl: 0     lovastatin (MEVACOR) 40 MG tablet, TAKE 1 TABLET AT BEDTIME (HYPERLIPIDEMIA LDL GOAL BELOW 130), Disp: 90 tablet, Rfl: 2     methimazole (TAPAZOLE) 5 MG tablet, 10 mg alternating with 15 mg every other day, Disp: 225 tablet, Rfl: 3     nitroGLYcerin (NITROSTAT) 0.4 MG sublingual tablet, For chest pain place 1 tablet under the tongue every 5 minutes for 3 doses. If symptoms persist 5 minutes after 1st dose call 911., Disp: 25 tablet, Rfl: 3     Omega-3 Fatty Acids (FISH OIL) 500 MG CAPS, Take 1 capsule by mouth 2 times daily , Disp: , Rfl:      omeprazole (PRILOSEC) 20 MG DR capsule, Take 1 capsule (20 mg) by mouth daily, Disp: 90 capsule, Rfl: 0     ondansetron (ZOFRAN) 8 MG tablet, Take 1 tablet (8 mg) by mouth every 8 hours as needed (Nausea/Vomiting), Disp: 30 tablet, Rfl: 5     " "order for DME, Equipment being ordered: Knee high compression for B LE 20-30mmHg, Velcro units for night time (consider hybrid sock as foot swelling is less than leg swelling), Donning device, Disp: 1 each, Rfl: 2     polyethylene glycol (MIRALAX/GLYCOLAX) packet, Take 17 g by mouth, Disp: , Rfl:      Probiotic Product (PROBIOTIC ADVANCED PO), Take 1 capsule by mouth every morning , Disp: , Rfl:      prochlorperazine (COMPAZINE) 10 MG tablet, Take 0.5 tablets (5 mg) by mouth every 6 hours as needed (Nausea/Vomiting - take if Zofran doesn't work after 30 mins.), Disp: 30 tablet, Rfl: 5     propranolol ER (INDERAL LA) 60 MG 24 hr capsule, TAKE 1 CAPSULE EVERY DAY, Disp: 90 capsule, Rfl: 1     rOPINIRole (REQUIP) 0.25 MG tablet, 0.25 mg in the afternoon and 0.5 mg at night, Disp: 180 tablet, Rfl: 1     senna-docusate (SENOKOT-S;PERICOLACE) 8.6-50 MG per tablet, Take 1 tablet by mouth 2 times daily To prevent constipation, Disp: 60 tablet, Rfl: 0     sertraline (ZOLOFT) 100 MG tablet, TAKE 1 TABLET (100 MG) BY MOUTH EVERY MORNING, Disp: 90 tablet, Rfl: 1     traZODone (DESYREL) 50 MG tablet, TAKE 1/2 TABLET(25 MG)  AT BEDTIME, Disp: 45 tablet, Rfl: 0    Current Facility-Administered Medications:      heparin 100 UNIT/ML injection 5 mL, 5 mL, Intracatheter, Q8H, Jaden Toledo MD, 5 mL at 03/06/19 1019    Facility-Administered Medications Ordered in Other Visits:      heparin 100 UNIT/ML injection 5 mL, 5 mL, Intracatheter, Daily PRN, Sd Fine MD, 5 mL at 03/01/19 1210    PHYSICAL EXAMINATION:  Vital signs: /69 (BP Location: Right arm, Patient Position: Sitting, Cuff Size: Adult Regular)   Pulse 72   Temp 98.1  F (36.7  C)   Resp 16   Ht 1.448 m (4' 9.01\")   Wt 65.8 kg (145 lb)   SpO2 95%   BMI 31.37 kg/m    ECOG performance status of 1. Living independently at home.  GENERAL: Well-nourished healthy-appearing sitting in chair, no acute distress.   HEENT: No icterus, no pallor. Moist mucous " membranes. Oropharynx is clear.   NECK: Supple, no JVD/LAD.  LUNGS: Clear to ausculation bilaterally, normal work of breathing.   CARDIOVASCULAR: Regular rate and rhythm, no murmurs, gallops or rubs.   ABDOMEN: Soft, nontender and nondistended, no palpable masses, bowel sounds present.  EXTREMITIES: No cyanosis, no clubbing, b/l LE edema improved.  NEUROLOGIC: No focal deficits, CN 2-12 intact.  LYMPH NODE EXAM: No palpable adenopathy - cervical, axillary or inguinal.     LABORATORY DATA:   Lab Test 03/06/19  1010 03/03/19  0820 03/01/19  1220  02/13/19  0901 02/11/19  1056   WBC 10.5 11.9* 17.7*   < > 9.3 15.6*   RBC 3.05* 2.57* 2.69*   < > 3.36* 3.49*   HGB 9.2* 7.7* 8.0*   < > 9.6* 10.1*   HCT 28.9* 24.5* 24.8*   < > 31.7* 32.6*   MCV 95 95 92   < > 94 93   MCH 30.2 30.0 29.7   < > 28.6 28.9   MCHC 31.8 31.4* 32.3   < > 30.3* 31.0*   RDW 22.0* 22.2* 20.3*   < > 22.3* 21.1*   PLT 48* 26* 21*   < > 138* 98*   NEUTROPHIL 78.3 79.4 75.0   < > 76.0 79.2   *  --   --   --  136 134   POTASSIUM 4.6  --   --   --  4.3 4.8   CHLORIDE 100  --   --   --  102 99   CO2 25  --   --   --  27 29   ANIONGAP 6  --   --   --  7 6   *  --   --   --  89 117*   BUN 26  --   --   --  35* 31*   CR 1.02  --   --   --  1.00 1.18*   SHREYA 8.8  --   --   --  8.8 9.6   PROTTOTAL 7.2  --   --   --  7.3 7.7   ALBUMIN 3.3*  --   --   --  3.3* 3.7   BILITOTAL 0.6  --   --   --  0.3 0.3   ALKPHOS 127  --   --   --  124 144   AST 19  --   --   --  19 23   ALT 20  --   --   --  20 24    < > = values in this interval not displayed.     Lab Test 03/03/19  0820 02/03/19  2102 01/02/19  0725 12/19/18  0930 12/04/18  1247   TSH 0.01* 0.01* <0.01* <0.01* <0.01*   T4 1.16 1.11 1.26 0.90 1.05     IMAGING STUDIES:  As above.    ASSESSMENT AND PLAN: Ms. 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     1. Upper tract urothelial cancer:   - She completed 4 cycles of adjuvant carboplatin plus gemcitabine chemotherapy per POUT trial. Has recovered well overall.   - We reviewed the CT scan results at length.  There is no clear evidence of disease on the scan, which is an excellent finding. Interestingly, the non-specific 8mm pulmonary nodules have shrunk by 50% in largest dimension since chemotherapy, suggesting that these could have been malignant.   - I did review the implication of malignant lung findings in that it would make her disease stage IV and incurable. I made her aware of the sensitivity of CT scan and its limitations. These nodules are too small to be biopsied at this time but will need close monitoring.   - She will now be placed on active surveillance.  We discussed what this involves, including close monitoring for new or worsening signs or symptoms such as shortness of breath, chest pain, abdominal pain, unexplained weight loss, hematuria etc.  - As part of active surveillance, she will also continue to see me every 3 months for the next year with restaging CT chest abdomen pelvis without contrast due to her renal function.   - Will schedule a follow-up for discussion of surveillance cystoscopy with Dr. King.   - Will discuss port removal on follow-up visit.     2. Renal artery saccular aneursym:  - Management per IR team.   3. Chemo induced anemia and thrombocytopenia:  - Improving and patient minimally symptomatic.   - Advised to continue holding aspirin 81mg for at least next 2 weeks and resume after platelets recover > 75K.   4. History of afib, recent episode of palpitations:  - Pt encouraged to keep cardiology follow-ups.   Return to see me in 3 months.  All of the above was explained to the patient in lay language and several questions were answered. They are in agreement with the plan.  BILLIN.  Jaden Toledo M.D.  [place]. Professor of  Medicine  Genitourinary Oncology  Division of Hematology, Oncology & Transplantation  [facility]

## 2019-03-06 NOTE — NURSING NOTE
"Oncology Rooming Note    March 6, 2019 10:27 AM   Dimple Oviedo is a 85 year old female who presents for:    Chief Complaint   Patient presents with     Port Draw     accessed with power needle, heparin locked, vitals checked     Oncology Clinic Visit     Return visit related to [facility] Renal Cancer     Initial Vitals: /69 (BP Location: Right arm, Patient Position: Sitting, Cuff Size: Adult Regular)   Pulse 72   Temp 98.1  F (36.7  C)   Resp 16   Ht 1.448 m (4' 9.01\")   Wt 65.8 kg (145 lb)   SpO2 95%   BMI 31.37 kg/m   Estimated body mass index is 31.37 kg/m  as calculated from the following:    Height as of this encounter: 1.448 m (4' 9.01\").    Weight as of this encounter: 65.8 kg (145 lb). Body surface area is 1.63 meters squared.  No Pain (0) Comment: Data Unavailable   No LMP recorded. Patient is postmenopausal.  Allergies reviewed: Yes  Medications reviewed: Yes    Medications: Medication refills not needed today.  Pharmacy name entered into Swank:    [id number] - [place], [place] - [address] [place] [address] AVE AT [facility] & [address] [place] [address] PHARMACY MAIL DELIVERY - [place] [address] [address]    Clinical concerns: No new concerns. Provider was notified.      Roxi Curry LPN            "

## 2019-03-06 NOTE — LETTER
3/6/2019     RE: Dimple Oviedo  [address]  [facility] [address]     Dear Colleague,    Thank you for referring your patient, Dimple Oviedo, to the [facility]. Please see a copy of my visit note below.    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    HISTORY OF PRESENT ILLNESS:  Ms. 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 Family accompanies her for the visit.    Ms. Oviedo had no further ER visits since last seen and does not report any new or uncontrolled cardiac issues. Recovering well from cycle 4 of chemotherapy. No residual side effects except for moderate fatigue. No clinical evidence of disease progression.    ONCOLOGIC HISTORY:  1. 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     Jan 2018 to September 2018- Persistent gross hematuria and underwent multiple procedures.      2/19/18 and 4/3/18- cystoscopies with bladder biopsies  which were negative for bladder tumor    1/17/18, 3/8/18, 7/26/18, 9/10/18- Multiple urine cytologies have come back positive by FISH for high-grade transitional cell carcinoma    9/10/18- Underwent a bilateral cystoureteroscopy with biopsy and brushing that showed  right upper tract cytology suspicious for high-grade urothelial ca. Right ureter brushing negative from that day. Left upper tract negative.    9/10/18- CT A/P without contrast showed new small bilateral pleural effusions and interstitial pulmonary edema but no evidence of locoregional or metastatic disease.    9/12/18- Presented to ED with oliguria, CRESENCIO, and mild hydronephrosis bilaterally  "on CT scan and underwent bilateral ureteral stent placment    11/13/2018- Right robotic nephroureterectomy, excision of sandip-caval mass and LN excision by Stuart King. Pathology showed \"Right nephroureterectomy: Invasive high grade urothelial carcinoma measuring 3.5 cm, located in renal pelvis and invading through renal parenchyma into perinephric fat. Urothelial carcinoma in-situ present. Margins free of tumor. Sandip-caval mass: Metastatic urothelial carcinoma involving one lymph node with at least 1 cm tumor deposit. Pericaval Extranodal Extension present. Five additional benign pericaval lymph nodes. Pathologic stage: pT4N1 (1 of 6 lymph nodes).\"    12/11/18- PET/CT whole body demonstrated significant residual FDG avid disease. For example, \"there is an FDG avid ill-defined pericaval mass immediately medial to the surgical clips measuring approximately 2.0 x 1.4 cm, with max SUV measuring up to12.2, see series 4 image 21. Additional FDG avid retrocaval and interaortocaval lymphadenopathy are noted.There is a 1.2 cm nodule in the right hemipelvis posterior lateral tothe bladder with max SUV measuring 8.3, see series 4 image 77. The bladder is incompletely distended.\" No suspicious thoracic or bone uptake.    12/12/18- Started adjuvant chemotherapy (carbo+gem) per POUT trial. Cycle 2 - 1/2/19. Cycle 3 - 1/23/19. Cycle 4 - 02/13/19.    1/22/19 - CT C/A/P with contrast compared with prior PET/CT: \"1. New well-circumscribed soft tissue pulmonary nodules of the right lower lobe and left upper lobe. Findings could be infectious, inflammatory, or represent metastatic disease. Continued attention on follow-up recommended. Postoperative changes of right nephrectomy. No evidence for local recurrence in the present study.\"    3/1/2019- CT C/A/P with contrast - \"1. Bilateral pulmonary nodules measuring up to 4 mm in the right lower lobe, previously measuring 8 mm. No new or enlarging pulmonary nodules. 2. No convincing " "evidence for metastatic disease in the abdomen, pelvis and bones.\"    REVIEW OF SYSTEMS: 14 point ROS negative other than the symptoms noted above in the HPI.    PAST MEDICAL AND SURGICAL HISTORY:   Past Medical History:   Diagnosis Date     Calculus of kidney      Chemotherapy-induced neutropenia (H) 3/6/2019     Esophageal reflux      GERD (gastroesophageal reflux disease)      Hyperlipidemia LDL goal <130 5/9/2010     Malignant melanoma of skin of trunk, except scrotum (H)      Nonspecific abnormal finding     has living will 2004 -      Nontoxic multinodular goiter     no further eval /tx rec per pt     Osteopenia      Other psoriasis      Personal history of colonic polyps      PMR (polymyalgia rheumatica) (H)      Stress-induced cardiomyopathy      Undiagnosed cardiac murmurs      Unspecified constipation      Unspecified essential hypertension      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     Past Surgical History:   Procedure Laterality Date     BIOPSY       C NONSPECIFIC PROCEDURE  2005    colonoscopy polyp repeat 2010     COLONOSCOPY  2014     COMBINED CYSTOSCOPY, INSERT STENT URETER(S) Bilateral 9/12/2018    Procedure: COMBINED CYSTOSCOPY, INSERT STENT URETER(S);  Cystoscopy Bilateral ureteral Stent Placement.;  Surgeon: Justin Henry MD;  [facility]     COMBINED CYSTOSCOPY, RETROGRADES, URETEROSCOPY, INSERT STENT Bilateral 4/3/2018    Procedure: COMBINED CYSTOSCOPY, RETROGRADES, URETEROSCOPY, INSERT STENT;;  Surgeon: Stuart King MD;  [facility]     COMBINED CYSTOSCOPY, RETROGRADES, URETEROSCOPY, INSERT STENT Bilateral 9/10/2018    Procedure: COMBINED CYSTOSCOPY, RETROGRADES, URETEROSCOPY, INSERT STENT;  Cystoscopy, Bilateral Ureteroscopy, Bladder Biopsies, Retrogram Pyelograms, Ureteral Washings and brushings, cysview;  Surgeon: Stuart King MD;  [facility]     CYSTOSCOPY, BIOPSY BLADDER INSTILL OPTICAL AGENT N/A 4/3/2018    Procedure: CYSTOSCOPY, BIOPSY " BLADDER INSTILL OPTICAL AGENT;  Cystoscopy, Blue Light Cystoscopy, Bladder Biopsies, Bilateral Selective ureteral washings for Cytology, Bilateral Retrograde Pyelograms, Bilateral Ureteroscopy;  Surgeon: Stuart King MD;  [facility]     CYSTOSCOPY, BIOPSY BLADDER, COMBINED N/A 2/19/2018    Procedure: COMBINED CYSTOSCOPY, BIOPSY BLADDER;  Cystoscopy, Bladder Biopsy;  Surgeon: Kenna La MD;  [facility]: [facility]     CYSTOSCOPY, REMOVE STENT(S), COMBINED  11/13/2018    Procedure: Flexible Cystoscopy with Stent Removal;  Surgeon: Stuart King MD;  [facility]     DAVINCI LYMPHADENECTOMY N/A 11/13/2018    Procedure: Davinci Lymphadenectomy ;  Surgeon: Stuart King MD;  [facility]     DAVINCI NEPHROURETERECTOMY N/A 11/13/2018    Procedure: Right DaVinci Assisted Nephroureterectomy;  Surgeon: Stuart King MD;  [facility]     ENDOSCOPIC ULTRASOUND LOWER GASTROINTESTIONAL TRACT (GI) N/A 10/30/2015    Procedure: ENDOSCOPIC ULTRASOUND LOWER GASTROINTESTIONAL TRACT (GI);  Surgeon: Daniel Jean-Baptiste MD;  [facility]     EYE SURGERY  12/4/17     INSERT PORT VASCULAR ACCESS Right 12/19/2018    Procedure: Chest Port Placement - right;  Surgeon: Stuart Chavez PA-C;  [facility]     IR CHEST PORT PLACEMENT > 5 YRS OF AGE  12/19/2018     LAPAROSCOPIC CHOLECYSTECTOMY WITH CHOLANGIOGRAMS N/A 11/1/2015    Procedure: LAPAROSCOPIC CHOLECYSTECTOMY WITH CHOLANGIOGRAMS;  Surgeon: Tnoie Warren MD;  [facility]     SURGICAL HISTORY OF -   1996    malignant melanoma     SURGICAL HISTORY OF -   1968    thyroid nodule     SURGICAL HISTORY OF -       D & C     SOCIAL HISTORY:   Social History     Tobacco Use     Smoking status: Never Smoker     Smokeless tobacco: Never Used   Substance Use Topics     Alcohol use: No     Alcohol/week: 0.0 oz     Drug use: No     FAMILY HISTORY:   Family History   Problem Relation Age of Onset      "Cancer Father         dec - esophageal and laryngeal     Heart Disease Mother      Respiratory Mother         dec     Breast Cancer Daughter      Other Cancer Daughter      Thyroid Disease Daughter      Asthma Daughter      Hyperlipidemia Son      Diabetes Son      ALLERGIES:   Allergies   Allergen Reactions     Codeine      CURRENT MEDICATIONS:   Current Outpatient Medications:      acetaminophen (TYLENOL) 650 MG CR tablet, Take 1 tablet (650 mg) by mouth every 8 hours as needed for pain, Disp: 100 tablet, Rfl: 0     alendronate (FOSAMAX) 70 MG tablet, TAKE 1 TABLET EVERY 7 DAYS AT LEAST 60 MIN BEFORE BREAKFAST AS DIRECTED \"SEE PACKAGE FOR ADDITIONAL INSTRUCTIONS\", Disp: 12 tablet, Rfl: 1     aspirin 81 MG tablet, Take 81 mg by mouth every evening , Disp: , Rfl:      Calcium Citrate-Vitamin D (CALCIUM + D PO), Take 1 tablet by mouth 2 times daily , Disp: , Rfl:      cycloSPORINE (RESTASIS) 0.05 % ophthalmic emulsion, Place 1 drop into both eyes 2 times daily, Disp: , Rfl:      ferrous sulfate 325 (65 Fe) MG TBEC EC tablet, Take 325 mg by mouth daily, Disp: , Rfl:      furosemide (LASIX) 20 MG tablet, TAKE 1 TABLET (20 MG) BY MOUTH EVERY MORNING, Disp: 30 tablet, Rfl: 0     irbesartan (AVAPRO) 300 MG tablet, TAKE 1 TABLET EVERY DAY, Disp: 90 tablet, Rfl: 3     lidocaine-prilocaine (EMLA) 2.5-2.5 % external cream, Apply topically as needed for moderate pain, Disp: 30 g, Rfl: 0     lovastatin (MEVACOR) 40 MG tablet, TAKE 1 TABLET AT BEDTIME (HYPERLIPIDEMIA LDL GOAL BELOW 130), Disp: 90 tablet, Rfl: 2     methimazole (TAPAZOLE) 5 MG tablet, 10 mg alternating with 15 mg every other day, Disp: 225 tablet, Rfl: 3     nitroGLYcerin (NITROSTAT) 0.4 MG sublingual tablet, For chest pain place 1 tablet under the tongue every 5 minutes for 3 doses. If symptoms persist 5 minutes after 1st dose call 911., Disp: 25 tablet, Rfl: 3     Omega-3 Fatty Acids (FISH OIL) 500 MG CAPS, Take 1 capsule by mouth 2 times daily , Disp: , Rfl: " "     omeprazole (PRILOSEC) 20 MG DR capsule, Take 1 capsule (20 mg) by mouth daily, Disp: 90 capsule, Rfl: 0     ondansetron (ZOFRAN) 8 MG tablet, Take 1 tablet (8 mg) by mouth every 8 hours as needed (Nausea/Vomiting), Disp: 30 tablet, Rfl: 5     order for DME, Equipment being ordered: Knee high compression for B LE 20-30mmHg, Velcro units for night time (consider hybrid sock as foot swelling is less than leg swelling), Donning device, Disp: 1 each, Rfl: 2     polyethylene glycol (MIRALAX/GLYCOLAX) packet, Take 17 g by mouth, Disp: , Rfl:      Probiotic Product (PROBIOTIC ADVANCED PO), Take 1 capsule by mouth every morning , Disp: , Rfl:      prochlorperazine (COMPAZINE) 10 MG tablet, Take 0.5 tablets (5 mg) by mouth every 6 hours as needed (Nausea/Vomiting - take if Zofran doesn't work after 30 mins.), Disp: 30 tablet, Rfl: 5     propranolol ER (INDERAL LA) 60 MG 24 hr capsule, TAKE 1 CAPSULE EVERY DAY, Disp: 90 capsule, Rfl: 1     rOPINIRole (REQUIP) 0.25 MG tablet, 0.25 mg in the afternoon and 0.5 mg at night, Disp: 180 tablet, Rfl: 1     senna-docusate (SENOKOT-S;PERICOLACE) 8.6-50 MG per tablet, Take 1 tablet by mouth 2 times daily To prevent constipation, Disp: 60 tablet, Rfl: 0     sertraline (ZOLOFT) 100 MG tablet, TAKE 1 TABLET (100 MG) BY MOUTH EVERY MORNING, Disp: 90 tablet, Rfl: 1     traZODone (DESYREL) 50 MG tablet, TAKE 1/2 TABLET(25 MG)  AT BEDTIME, Disp: 45 tablet, Rfl: 0    Current Facility-Administered Medications:      heparin 100 UNIT/ML injection 5 mL, 5 mL, Intracatheter, Q8H, Jaden Toledo MD, 5 mL at 03/06/19 1019    Facility-Administered Medications Ordered in Other Visits:      heparin 100 UNIT/ML injection 5 mL, 5 mL, Intracatheter, Daily PRN, Sd Fine MD, 5 mL at 03/01/19 1210    PHYSICAL EXAMINATION:  Vital signs: /69 (BP Location: Right arm, Patient Position: Sitting, Cuff Size: Adult Regular)   Pulse 72   Temp 98.1  F (36.7  C)   Resp 16   Ht 1.448 m (4' 9.01\")   " Wt 65.8 kg (145 lb)   SpO2 95%   BMI 31.37 kg/m     ECOG performance status of 1. Living independently at home.  GENERAL: Well-nourished healthy-appearing sitting in chair, no acute distress.   HEENT: No icterus, no pallor. Moist mucous membranes. Oropharynx is clear.   NECK: Supple, no JVD/LAD.  LUNGS: Clear to ausculation bilaterally, normal work of breathing.   CARDIOVASCULAR: Regular rate and rhythm, no murmurs, gallops or rubs.   ABDOMEN: Soft, nontender and nondistended, no palpable masses, bowel sounds present.  EXTREMITIES: No cyanosis, no clubbing, b/l LE edema improved.  NEUROLOGIC: No focal deficits, CN 2-12 intact.  LYMPH NODE EXAM: No palpable adenopathy - cervical, axillary or inguinal.     LABORATORY DATA:   Lab Test 03/06/19  1010 03/03/19  0820 03/01/19  1220  02/13/19  0901 02/11/19  1056   WBC 10.5 11.9* 17.7*   < > 9.3 15.6*   RBC 3.05* 2.57* 2.69*   < > 3.36* 3.49*   HGB 9.2* 7.7* 8.0*   < > 9.6* 10.1*   HCT 28.9* 24.5* 24.8*   < > 31.7* 32.6*   MCV 95 95 92   < > 94 93   MCH 30.2 30.0 29.7   < > 28.6 28.9   MCHC 31.8 31.4* 32.3   < > 30.3* 31.0*   RDW 22.0* 22.2* 20.3*   < > 22.3* 21.1*   PLT 48* 26* 21*   < > 138* 98*   NEUTROPHIL 78.3 79.4 75.0   < > 76.0 79.2   *  --   --   --  136 134   POTASSIUM 4.6  --   --   --  4.3 4.8   CHLORIDE 100  --   --   --  102 99   CO2 25  --   --   --  27 29   ANIONGAP 6  --   --   --  7 6   *  --   --   --  89 117*   BUN 26  --   --   --  35* 31*   CR 1.02  --   --   --  1.00 1.18*   SHREYA 8.8  --   --   --  8.8 9.6   PROTTOTAL 7.2  --   --   --  7.3 7.7   ALBUMIN 3.3*  --   --   --  3.3* 3.7   BILITOTAL 0.6  --   --   --  0.3 0.3   ALKPHOS 127  --   --   --  124 144   AST 19  --   --   --  19 23   ALT 20  --   --   --  20 24    < > = values in this interval not displayed.     Lab Test 03/03/19  0820 02/03/19  2102 01/02/19  0725 12/19/18  0930 12/04/18  1247   TSH 0.01* 0.01* <0.01* <0.01* <0.01*   T4 1.16 1.11 1.26 0.90 1.05     IMAGING  STUDIES:  As above.    ASSESSMENT AND PLAN: Ms. Oviedo is a delightful 85-year-old lady with recently diagnosed localized stage IV ([id number]) high-grade, muscle-invasive transitional cell carcinoma of right renal pelvis, status post right robotic nephroureterectomy, here for follow-up.     1. Upper tract urothelial cancer:   - She completed 4 cycles of adjuvant carboplatin plus gemcitabine chemotherapy per POUT trial. Has recovered well overall.   - We reviewed the CT scan results at length.  There is no clear evidence of disease on the scan, which is an excellent finding. Interestingly, the non-specific 8mm pulmonary nodules have shrunk by 50% in largest dimension since chemotherapy, suggesting that these could have been malignant.   - I did review the implication of malignant lung findings in that it would make her disease stage IV and incurable. I made her aware of the sensitivity of CT scan and its limitations. These nodules are too small to be biopsied at this time but will need close monitoring.   - She will now be placed on active surveillance.  We discussed what this involves, including close monitoring for new or worsening signs or symptoms such as shortness of breath, chest pain, abdominal pain, unexplained weight loss, hematuria etc.  - As part of active surveillance, she will also continue to see me every 3 months for the next year with restaging CT chest abdomen pelvis without contrast due to her renal function.   -  Will schedule a follow-up for discussion of surveillance cystoscopy with Dr. King .   - Will discuss port removal on follow-up visit.     2. Renal artery saccular aneursym:  - Management per IR team.   3. Chemo induced anemia and thrombocytopenia:  - Improving and patient minimally symptomatic.   - Advised to continue holding aspirin 81mg for at least next 2 weeks and resume after platelets recover > 75K.   4. History of afib, recent episode of palpitations:  - Pt encouraged to keep  cardiology follow-ups.   Return to see me in 3 months.  All of the above was explained to the patient in lay language and several questions were answered. They are in agreement with the plan.  BILLIN.    Jaden Toledo M.D.  . Professor of Medicine  Genitourinary Oncology  Division of Hematology, Oncology & Transplantation  [facility]

## 2019-03-11 ENCOUNTER — OFFICE VISIT (OUTPATIENT)
Dept: ENDOCRINOLOGY | Facility: CLINIC | Age: 84
End: 2019-03-11
Payer: MEDICARE

## 2019-03-11 VITALS
SYSTOLIC BLOOD PRESSURE: 138 MMHG | BODY MASS INDEX: 31.61 KG/M2 | WEIGHT: 146.5 LBS | HEIGHT: 57 IN | DIASTOLIC BLOOD PRESSURE: 66 MMHG | HEART RATE: 67 BPM

## 2019-03-11 DIAGNOSIS — E05.00 GRAVES DISEASE: Primary | ICD-10-CM

## 2019-03-11 ASSESSMENT — PAIN SCALES - GENERAL: PAINLEVEL: NO PAIN (0)

## 2019-03-11 ASSESSMENT — MIFFLIN-ST. JEOR: SCORE: 983.52

## 2019-03-11 NOTE — LETTER
3/11/2019       RE: Dimple Oviedo  [address]  [facility] [address]     Dear Colleague,    Thank you for referring your patient, Dimple Oviedo, to the [facility] at [facility]. Please see a copy of my visit note below.    This 85 year old woman is here for f/u of Graves.  she first developed symptoms of hyperthyroidism in December 2017.  She was most troubled by the tremor that occurred.  She had not noticed change in her skin or hair.  She saw Dr. Rojas in the community and he started her on methimazole.  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  Because she had received an iodine dye load when she had a coronary angiogram, we were unable to do a radioiodine scan and uptake to determine the precise cause of her hyperthyroidism.  She was maintained on methimazole until June 2017 when it was stopped.  A radioiodine uptake and scan was done in July, showed an uptake of ~ 70%, and confirmed she had Graves' disease.  She was restarted on methimazole in July and we have been adjusting the dose since then.  She has now been on 15 mg alternating with 10 mg since the fall.      Since our last visit she underwent chemotherapy for her bladder cancer.  That disease now seems to be in remission.   She is now on active surveillance.    Today she is here with her daughter in law.  She reports she feel tired but otherwise fine.  She may have a bit of a tremor, but nothing like in the past.  She has an OK appetite but lost weight with chemo.  She is both hot and cold.  Her hair and skin are fine.  She takes her drug as prescribed.      Current Outpatient Medications on File Prior to Visit:  acetaminophen (TYLENOL) 650 MG CR tablet Take 1 tablet (650 mg) by mouth every 8 hours as needed for pain   alendronate (FOSAMAX) 70 MG tablet TAKE 1 TABLET EVERY 7 DAYS AT LEAST 60 MIN BEFORE  "BREAKFAST AS DIRECTED \"SEE PACKAGE FOR ADDITIONAL INSTRUCTIONS\"   aspirin 81 MG tablet Take 81 mg by mouth every evening    Calcium Citrate-Vitamin D (CALCIUM + D PO) Take 1 tablet by mouth 2 times daily    cycloSPORINE (RESTASIS) 0.05 % ophthalmic emulsion Place 1 drop into both eyes 2 times daily   ferrous sulfate 325 (65 Fe) MG TBEC EC tablet Take 325 mg by mouth daily   furosemide (LASIX) 20 MG tablet TAKE 1 TABLET (20 MG) BY MOUTH EVERY MORNING   irbesartan (AVAPRO) 300 MG tablet TAKE 1 TABLET EVERY DAY   lidocaine-prilocaine (EMLA) 2.5-2.5 % external cream Apply topically as needed for moderate pain   lovastatin (MEVACOR) 40 MG tablet TAKE 1 TABLET AT BEDTIME (HYPERLIPIDEMIA LDL GOAL BELOW 130)   methimazole (TAPAZOLE) 5 MG tablet 10 mg alternating with 15 mg every other day   nitroGLYcerin (NITROSTAT) 0.4 MG sublingual tablet For chest pain place 1 tablet under the tongue every 5 minutes for 3 doses. If symptoms persist 5 minutes after 1st dose call 911.   Omega-3 Fatty Acids (FISH OIL) 500 MG CAPS Take 1 capsule by mouth 2 times daily    omeprazole (PRILOSEC) 20 MG DR capsule Take 1 capsule (20 mg) by mouth daily   ondansetron (ZOFRAN) 8 MG tablet Take 1 tablet (8 mg) by mouth every 8 hours as needed (Nausea/Vomiting)   order for DME Equipment being ordered: Knee high compression for B LE 20-30mmHg, Velcro units for night time (consider hybrid sock as foot swelling is less than leg swelling), Donning device   polyethylene glycol (MIRALAX/GLYCOLAX) packet Take 17 g by mouth   Probiotic Product (PROBIOTIC ADVANCED PO) Take 1 capsule by mouth every morning    prochlorperazine (COMPAZINE) 10 MG tablet Take 0.5 tablets (5 mg) by mouth every 6 hours as needed (Nausea/Vomiting - take if Zofran doesn't work after 30 mins.)   propranolol ER (INDERAL LA) 60 MG 24 hr capsule TAKE 1 CAPSULE EVERY DAY   rOPINIRole (REQUIP) 0.25 MG tablet 0.25 mg in the afternoon and 0.5 mg at night   senna-docusate (SENOKOT-S;PERICOLACE) " "8.6-50 MG per tablet Take 1 tablet by mouth 2 times daily To prevent constipation   sertraline (ZOLOFT) 100 MG tablet TAKE 1 TABLET (100 MG) BY MOUTH EVERY MORNING   traZODone (DESYREL) 50 MG tablet TAKE 1/2 TABLET(25 MG)  AT BEDTIME   [] LORazepam (ATIVAN) 0.5 MG tablet Take 1 tablet (0.5 mg) by mouth every 6 hours as needed for anxiety, nausea or sleep     No current facility-administered medications on file prior to visit.     ROS: 10 point ROS neg other than the symptoms noted above in the HPI.  Vital signs:   /66   Pulse 67   Ht 1.448 m (4' 9.01\")   Wt 66.5 kg (146 lb 8 oz)   BMI 31.69 kg/m     Estimated body mass index is 31.69 kg/m  as calculated from the following:    Height as of this encounter: 1.448 m (4' 9.01\").    Weight as of this encounter: 66.5 kg (146 lb 8 oz).    NAD  Eyes - no periorbital edema, conjunctival injection, scleral icterus  Neck - no thyromegaly  Lungs - clear  CV - RRR.  Normal S1, S2 w/o murmur or gallop   Neuro  DTR 2/4 biceps  Skin - normal texture           ENDO THYROID LABS-[facility] Latest Ref Rng & Units 3/3/2019 2/3/2019   TSH 0.40 - 4.00 mU/L 0.01 (L) 0.01 (L)   T4 FREE 0.76 - 1.46 ng/dL 1.16 1.11   FREE T3 2.3 - 4.2 pg/mL     TRIIODOTHYRONINE(T3) 60 - 181 ng/dL 109    THYROGLOBULIN ANTIBODY <40 IU/mL     THYR PEROXIDASE ZARA <35 IU/mL     THYROID STIM IMMUNOG <=1.3 TSI index       ENDO THYROID LABS-[facility] Latest Ref Rng & Units 2018   TSH 0.40 - 4.00 mU/L <0.01 (L) <0.01 (L)   T4 FREE 0.76 - 1.46 ng/dL 1.26 0.90   FREE T3 2.3 - 4.2 pg/mL     TRIIODOTHYRONINE(T3) 60 - 181 ng/dL 109    THYROGLOBULIN ANTIBODY <40 IU/mL     THYR PEROXIDASE ZARA <35 IU/mL     THYROID STIM IMMUNOG <=1.3 TSI index       ENDO THYROID LABS-[facility] Latest Ref Rng & Units 2018   TSH 0.40 - 4.00 mU/L <0.01 (L) <0.01 (L)   T4 FREE 0.76 - 1.46 ng/dL 1.05 1.16   FREE T3 2.3 - 4.2 pg/mL     TRIIODOTHYRONINE(T3) 60 - 181 ng/dL 112 118   THYROGLOBULIN ANTIBODY <40 IU/mL   "   THYR PEROXIDASE ZARA <35 IU/mL     THYROID STIM IMMUNOG <=1.3 TSI index         Assessment/Plan:    Clinically and biochemically she appears to be euthyroid.  Will continue the current dose of methimazole.  Will see back in July or August and consider stopping drug then. May plan to repeat US then as well to examine thyroid nodules and see if any have changed.    Dhara Meza MD

## 2019-03-11 NOTE — PROGRESS NOTES
"This 85 year old woman is here for f/u of Graves.  she first developed symptoms of hyperthyroidism in December 2017.  She was most troubled by the tremor that occurred.  She had not noticed change in her skin or hair.  She saw Dr. Rojas in the community and he started her on methimazole.  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  Because she had received an iodine dye load when she had a coronary angiogram, we were unable to do a radioiodine scan and uptake to determine the precise cause of her hyperthyroidism.  She was maintained on methimazole until June 2017 when it was stopped.  A radioiodine uptake and scan was done in July, showed an uptake of ~ 70%, and confirmed she had Graves' disease.  She was restarted on methimazole in July and we have been adjusting the dose since then.  She has now been on 15 mg alternating with 10 mg since the fall.      Since our last visit she underwent chemotherapy for her bladder cancer.  That disease now seems to be in remission.   She is now on active surveillance.    Today she is here with her daughter in law.  She reports she feel tired but otherwise fine.  She may have a bit of a tremor, but nothing like in the past.  She has an OK appetite but lost weight with chemo.  She is both hot and cold.  Her hair and skin are fine.  She takes her drug as prescribed.      Current Outpatient Medications on File Prior to Visit:  acetaminophen (TYLENOL) 650 MG CR tablet Take 1 tablet (650 mg) by mouth every 8 hours as needed for pain   alendronate (FOSAMAX) 70 MG tablet TAKE 1 TABLET EVERY 7 DAYS AT LEAST 60 MIN BEFORE BREAKFAST AS DIRECTED \"SEE PACKAGE FOR ADDITIONAL INSTRUCTIONS\"   aspirin 81 MG tablet Take 81 mg by mouth every evening    Calcium Citrate-Vitamin D (CALCIUM + D PO) Take 1 tablet by mouth 2 times daily    cycloSPORINE (RESTASIS) 0.05 % ophthalmic emulsion Place 1 drop into both eyes 2 times " daily   ferrous sulfate 325 (65 Fe) MG TBEC EC tablet Take 325 mg by mouth daily   furosemide (LASIX) 20 MG tablet TAKE 1 TABLET (20 MG) BY MOUTH EVERY MORNING   irbesartan (AVAPRO) 300 MG tablet TAKE 1 TABLET EVERY DAY   lidocaine-prilocaine (EMLA) 2.5-2.5 % external cream Apply topically as needed for moderate pain   lovastatin (MEVACOR) 40 MG tablet TAKE 1 TABLET AT BEDTIME (HYPERLIPIDEMIA LDL GOAL BELOW 130)   methimazole (TAPAZOLE) 5 MG tablet 10 mg alternating with 15 mg every other day   nitroGLYcerin (NITROSTAT) 0.4 MG sublingual tablet For chest pain place 1 tablet under the tongue every 5 minutes for 3 doses. If symptoms persist 5 minutes after 1st dose call 911.   Omega-3 Fatty Acids (FISH OIL) 500 MG CAPS Take 1 capsule by mouth 2 times daily    omeprazole (PRILOSEC) 20 MG DR capsule Take 1 capsule (20 mg) by mouth daily   ondansetron (ZOFRAN) 8 MG tablet Take 1 tablet (8 mg) by mouth every 8 hours as needed (Nausea/Vomiting)   order for DME Equipment being ordered: Knee high compression for B LE 20-30mmHg, Velcro units for night time (consider hybrid sock as foot swelling is less than leg swelling), Donning device   polyethylene glycol (MIRALAX/GLYCOLAX) packet Take 17 g by mouth   Probiotic Product (PROBIOTIC ADVANCED PO) Take 1 capsule by mouth every morning    prochlorperazine (COMPAZINE) 10 MG tablet Take 0.5 tablets (5 mg) by mouth every 6 hours as needed (Nausea/Vomiting - take if Zofran doesn't work after 30 mins.)   propranolol ER (INDERAL LA) 60 MG 24 hr capsule TAKE 1 CAPSULE EVERY DAY   rOPINIRole (REQUIP) 0.25 MG tablet 0.25 mg in the afternoon and 0.5 mg at night   senna-docusate (SENOKOT-S;PERICOLACE) 8.6-50 MG per tablet Take 1 tablet by mouth 2 times daily To prevent constipation   sertraline (ZOLOFT) 100 MG tablet TAKE 1 TABLET (100 MG) BY MOUTH EVERY MORNING   traZODone (DESYREL) 50 MG tablet TAKE 1/2 TABLET(25 MG)  AT BEDTIME   [] LORazepam (ATIVAN) 0.5 MG tablet Take 1 tablet  "(0.5 mg) by mouth every 6 hours as needed for anxiety, nausea or sleep     No current facility-administered medications on file prior to visit.     ROS: 10 point ROS neg other than the symptoms noted above in the HPI.  Vital signs:   /66   Pulse 67   Ht 1.448 m (4' 9.01\")   Wt 66.5 kg (146 lb 8 oz)   BMI 31.69 kg/m    Estimated body mass index is 31.69 kg/m  as calculated from the following:    Height as of this encounter: 1.448 m (4' 9.01\").    Weight as of this encounter: 66.5 kg (146 lb 8 oz).    NAD  Eyes - no periorbital edema, conjunctival injection, scleral icterus  Neck - no thyromegaly  Lungs - clear  CV - RRR.  Normal S1, S2 w/o murmur or gallop   Neuro  DTR 2/4 biceps  Skin - normal texture           ENDO THYROID LABS-[facility] Latest Ref Rng & Units 3/3/2019 2/3/2019   TSH 0.40 - 4.00 mU/L 0.01 (L) 0.01 (L)   T4 FREE 0.76 - 1.46 ng/dL 1.16 1.11   FREE T3 2.3 - 4.2 pg/mL     TRIIODOTHYRONINE(T3) 60 - 181 ng/dL 109    THYROGLOBULIN ANTIBODY <40 IU/mL     THYR PEROXIDASE ZARA <35 IU/mL     THYROID STIM IMMUNOG <=1.3 TSI index       ENDO THYROID LABS-[facility] Latest Ref Rng & Units 1/2/2019 12/19/2018   TSH 0.40 - 4.00 mU/L <0.01 (L) <0.01 (L)   T4 FREE 0.76 - 1.46 ng/dL 1.26 0.90   FREE T3 2.3 - 4.2 pg/mL     TRIIODOTHYRONINE(T3) 60 - 181 ng/dL 109    THYROGLOBULIN ANTIBODY <40 IU/mL     THYR PEROXIDASE ZARA <35 IU/mL     THYROID STIM IMMUNOG <=1.3 TSI index       ENDO THYROID LABS-[facility] Latest Ref Rng & Units 12/4/2018 11/26/2018   TSH 0.40 - 4.00 mU/L <0.01 (L) <0.01 (L)   T4 FREE 0.76 - 1.46 ng/dL 1.05 1.16   FREE T3 2.3 - 4.2 pg/mL     TRIIODOTHYRONINE(T3) 60 - 181 ng/dL 112 118   THYROGLOBULIN ANTIBODY <40 IU/mL     THYR PEROXIDASE ZARA <35 IU/mL     THYROID STIM IMMUNOG <=1.3 TSI index         Assessment/Plan:    Clinically and biochemically she appears to be euthyroid.  Will continue the current dose of methimazole.  Will see back in July or August and consider stopping drug then. May plan to repeat US " then as well to examine thyroid nodules and see if any have changed.    Dhara Meza MD

## 2019-03-14 DIAGNOSIS — I87.303 STASIS EDEMA OF BOTH LOWER EXTREMITIES: ICD-10-CM

## 2019-03-14 DIAGNOSIS — I50.30 (HFPEF) HEART FAILURE WITH PRESERVED EJECTION FRACTION (H): ICD-10-CM

## 2019-03-14 NOTE — TELEPHONE ENCOUNTER
"Requested Prescriptions   Pending Prescriptions Disp Refills     furosemide (LASIX) 20 MG tablet [Pharmacy Med Name: FUROSEMIDE 20 MG Tablet]  Last Written Prescription Date:  2/19/2019  Last Fill Quantity: 30 tablet,  # refills: 0   Last Office Visit: 1/16/2019   Future Office Visit:      90 tablet 1     Sig: TAKE 1 TABLET (20 MG) BY MOUTH EVERY MORNING    Diuretics (Including Combos) Protocol Failed - 3/14/2019  9:46 AM       Failed - Normal serum sodium on file in past 12 months    Recent Labs   Lab Test 03/06/19  1010   *           Passed - Blood pressure under 140/90 in past 12 months    BP Readings from Last 3 Encounters:   03/11/19 138/66   03/06/19 155/69   03/03/19 163/73          Passed - Recent (12 mo) or future (30 days) visit within the authorizing provider's specialty    Patient had office visit in the last 12 months or has a visit in the next 30 days with authorizing provider or within the authorizing provider's specialty.  See \"Patient Info\" tab in inbasket, or \"Choose Columns\" in Meds & Orders section of the refill encounter.           Passed - Medication is active on med list       Passed - Patient is age 18 or older       Passed - No active pregancy on record       Passed - Normal serum creatinine on file in past 12 months    Recent Labs   Lab Test 03/06/19  1010   CR 1.02           Passed - Normal serum potassium on file in past 12 months    Recent Labs   Lab Test 03/06/19  1010   POTASSIUM 4.6           Passed - No positive pregnancy test in past 12 months          "

## 2019-03-18 RX ORDER — FUROSEMIDE 20 MG
20 TABLET ORAL EVERY MORNING
Qty: 90 TABLET | Refills: 1 | Status: SHIPPED | OUTPATIENT
Start: 2019-03-18 | End: 2019-08-19

## 2019-03-19 DIAGNOSIS — E04.2 NONTOXIC MULTINODULAR GOITER: ICD-10-CM

## 2019-03-19 DIAGNOSIS — G25.81 RESTLESS LEGS SYNDROME: ICD-10-CM

## 2019-03-19 NOTE — TELEPHONE ENCOUNTER
"Requested Prescriptions   Pending Prescriptions Disp Refills     propranolol ER (INDERAL LA) 60 MG 24 hr capsule  Last Written Prescription Date:  1/9/2019  Last Fill Quantity: 90 capsule,  # refills: 1   Last Office Visit: 1/16/2019   Future Office Visit:      90 capsule 1     Sig: TAKE 1 CAPSULE EVERY DAY    Beta-Blockers Protocol Passed - 3/19/2019  2:35 PM       Passed - Blood pressure under 140/90 in past 12 months    BP Readings from Last 3 Encounters:   03/11/19 138/66   03/06/19 155/69   03/03/19 163/73          Passed - Patient is age 6 or older       Passed - Recent (12 mo) or future (30 days) visit within the authorizing provider's specialty    Patient had office visit in the last 12 months or has a visit in the next 30 days with authorizing provider or within the authorizing provider's specialty.  See \"Patient Info\" tab in inbasket, or \"Choose Columns\" in Meds & Orders section of the refill encounter.           Passed - Medication is active on med list          "

## 2019-03-20 NOTE — TELEPHONE ENCOUNTER
"Requested Prescriptions   Pending Prescriptions Disp Refills     rOPINIRole (REQUIP) 0.25 MG tablet [Pharmacy Med Name: ROPINIROLE HCL 0.25 MG Tablet]  Last Written Prescription Date:  12/31/18  Last Fill Quantity: 180,  # refills: 1   Last office visit: 1/16/2019 with prescribing provider:     Future Office Visit:     180 tablet 1     Sig: TAKE 1 TABLET TWICE DAILY AS NEEDED FOR RESTLESS LEGS    Antiparkinson's Agents Protocol Passed - 3/19/2019  5:42 PM       Passed - Blood pressure under 140/90 in past 12 months    BP Readings from Last 3 Encounters:   03/11/19 138/66   03/06/19 155/69   03/03/19 163/73          Passed - CBC on record in past 12 months    Recent Labs   Lab Test 03/06/19  1010   WBC 10.5   RBC 3.05*   HGB 9.2*   HCT 28.9*   PLT 48*          Passed - ALT on record in past 12 months        Recent Labs   Lab Test 03/06/19  1010   ALT 20          Passed - Serum Creatinine on file in past 12 months    Recent Labs   Lab Test 03/06/19  1010   CR 1.02          Passed - Medication is active on med list       Passed - Patient is age 18 or older       Passed - No active pregnancy on record       Passed - No positive pregnancy test in the past 12 months       Passed - Recent (6 mo) or future (30 days) visit within the authorizing provider's specialty    Patient had office visit in the last 6 months or has a visit in the next 30 days with authorizing provider or within the authorizing provider's specialty.  See \"Patient Info\" tab in inbasket, or \"Choose Columns\" in Meds & Orders section of the refill encounter.              "

## 2019-03-21 RX ORDER — ROPINIROLE 0.25 MG/1
TABLET, FILM COATED ORAL
Qty: 270 TABLET | Refills: 1 | Status: SHIPPED | OUTPATIENT
Start: 2019-03-21 | End: 2019-10-04

## 2019-03-21 RX ORDER — PROPRANOLOL HCL 60 MG
CAPSULE, EXTENDED RELEASE 24HR ORAL
Qty: 90 CAPSULE | Refills: 0 | Status: SHIPPED | OUTPATIENT
Start: 2019-03-21 | End: 2019-08-02

## 2019-04-03 DIAGNOSIS — K21.9 GASTROESOPHAGEAL REFLUX DISEASE WITHOUT ESOPHAGITIS: ICD-10-CM

## 2019-04-04 NOTE — TELEPHONE ENCOUNTER
"Requested Prescriptions   Pending Prescriptions Disp Refills     omeprazole (PRILOSEC) 20 MG DR capsule [Pharmacy Med Name: OMEPRAZOLE 20 MG Capsule Delayed Release] 90 capsule 0     Sig: TAKE 1 CAPSULE EVERY DAY    PPI Protocol Passed - 4/3/2019  6:54 PM       Passed - Not on Clopidogrel (unless Pantoprazole ordered)       Passed - No diagnosis of osteoporosis on record       Passed - Recent (12 mo) or future (30 days) visit within the authorizing provider's specialty    Patient had office visit in the last 12 months or has a visit in the next 30 days with authorizing provider or within the authorizing provider's specialty.  See \"Patient Info\" tab in inbasket, or \"Choose Columns\" in Meds & Orders section of the refill encounter.             Passed - Medication is active on med list       Passed - Patient is age 18 or older       Passed - No active pregnacy on record       Passed - No positive pregnancy test in past 12 months        Prescription approved per [facility] Refill Protocol.    Signed Prescriptions:                        Disp   Refills    omeprazole (PRILOSEC) 20 MG DR capsule     90 cap*2        Sig: TAKE 1 CAPSULE EVERY DAY  Authorizing Provider: BLAYNE MORENO  Ordering User: RADHA BUSBY      Closing encounter - no further actions needed at this time    Radha Busby RN    "

## 2019-04-04 NOTE — TELEPHONE ENCOUNTER
FUTURE VISIT INFORMATION      FUTURE VISIT INFORMATION:    Date: 4/10/19    Time: 11AM    Location: [facility]  REFERRAL INFORMATION:    Referring provider:  Dangelo Montalvo    Referring providers clinic:  [facility]     Reason for visit/diagnosis  hearing loss    RECORDS REQUESTED FROM:       Clinic name Comments Records Status Imaging Status   [facility]  2/22/19 audio and notes with Dangelo KRUEGER

## 2019-04-10 ENCOUNTER — PRE VISIT (OUTPATIENT)
Dept: OTOLARYNGOLOGY | Facility: CLINIC | Age: 84
End: 2019-04-10

## 2019-04-10 ENCOUNTER — OFFICE VISIT (OUTPATIENT)
Dept: OTOLARYNGOLOGY | Facility: CLINIC | Age: 84
End: 2019-04-10
Payer: MEDICARE

## 2019-04-10 VITALS
WEIGHT: 143 LBS | SYSTOLIC BLOOD PRESSURE: 149 MMHG | BODY MASS INDEX: 30.94 KG/M2 | DIASTOLIC BLOOD PRESSURE: 67 MMHG | OXYGEN SATURATION: 95 % | HEART RATE: 60 BPM

## 2019-04-10 DIAGNOSIS — H90.3 ASYMMETRICAL SENSORINEURAL HEARING LOSS: Primary | ICD-10-CM

## 2019-04-10 ASSESSMENT — PAIN SCALES - GENERAL: PAINLEVEL: NO PAIN (0)

## 2019-04-10 NOTE — PATIENT INSTRUCTIONS
You were seen in the ENT clinic today with Dr. Mulu Hardwick has referred you to see one of your Laryngologists- Dr. Arias or Dr. Bonner. You may schedule this at your convenience.     Please follow up with Dr. Nissen or Dr. Adamson in one year for your hearing. You will need another hearing test with this appointment.       Please call our clinic for any questions, concerns, and/or worsening symptoms.      Clinic #[phone]       Option 1 for scheduling.    Thank you for allowing us to be apart of your care!    Sharmaine BILLINGS, ATUL[facility]    If you need to reach me my direct line is: [phone]   \

## 2019-04-10 NOTE — PROGRESS NOTES
Reason for Visit:  Dimple Oviedo is seen because of audiology evaluation.      History of Present Illness: She is an 85-year-old female being seen for asymmetric word recognition.  The patient has really not noticed the asymmetry, but recent audiogram was obtained because of her treatment with carboplatinum.  An asymmetric word recognition score was seen on her audiogram.  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  She notices the decline in hearing symmetrically.    Past Medical History:   Diagnosis Date     Calculus of kidney      Chemotherapy-induced neutropenia (H) 3/6/2019     Esophageal reflux      GERD (gastroesophageal reflux disease)      Hyperlipidemia LDL goal <130 5/9/2010     Malignant melanoma of skin of trunk, except scrotum (H)      Nonspecific abnormal finding     has living will 2004 -      Nontoxic multinodular goiter     no further eval /tx rec per pt     Osteopenia      Other psoriasis      Personal history of colonic polyps      PMR (polymyalgia rheumatica) (H)      Stress-induced cardiomyopathy      Undiagnosed cardiac murmurs      Unspecified constipation      Unspecified essential hypertension      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       Past Surgical History:   Procedure Laterality Date     BIOPSY       C NONSPECIFIC PROCEDURE  2005    colonoscopy polyp repeat 2010     COLONOSCOPY  2014     COMBINED CYSTOSCOPY, INSERT STENT URETER(S) Bilateral 9/12/2018    Procedure: COMBINED CYSTOSCOPY, INSERT STENT URETER(S);  Cystoscopy Bilateral ureteral Stent Placement.;  Surgeon: Justin Henry MD;  Location: [facility]     COMBINED CYSTOSCOPY, RETROGRADES, URETEROSCOPY, INSERT STENT Bilateral 4/3/2018    Procedure: COMBINED CYSTOSCOPY, RETROGRADES, URETEROSCOPY, INSERT STENT;;  Surgeon: Stuart King MD;  Location: [facility]     COMBINED CYSTOSCOPY, RETROGRADES, URETEROSCOPY, INSERT STENT Bilateral 9/10/2018    Procedure:  COMBINED CYSTOSCOPY, RETROGRADES, URETEROSCOPY, INSERT STENT;  Cystoscopy, Bilateral Ureteroscopy, Bladder Biopsies, Retrogram Pyelograms, Ureteral Washings and brushings, cysview;  Surgeon: Stuart King MD;  [facility]     CYSTOSCOPY, BIOPSY BLADDER INSTILL OPTICAL AGENT N/A 4/3/2018    Procedure: CYSTOSCOPY, BIOPSY BLADDER INSTILL OPTICAL AGENT;  Cystoscopy, Blue Light Cystoscopy, Bladder Biopsies, Bilateral Selective ureteral washings for Cytology, Bilateral Retrograde Pyelograms, Bilateral Ureteroscopy;  Surgeon: Stuart King MD;  [facility]     CYSTOSCOPY, BIOPSY BLADDER, COMBINED N/A 2/19/2018    Procedure: COMBINED CYSTOSCOPY, BIOPSY BLADDER;  Cystoscopy, Bladder Biopsy;  Surgeon: Kenna La MD;  [facility]: [facility]     CYSTOSCOPY, REMOVE STENT(S), COMBINED  11/13/2018    Procedure: Flexible Cystoscopy with Stent Removal;  Surgeon: Stuart King MD;  Location: [facility]     DAVINCI LYMPHADENECTOMY N/A 11/13/2018    Procedure: Davinci Lymphadenectomy ;  Surgeon: Stuart King MD;  [facility]     DAVINCI NEPHROURETERECTOMY N/A 11/13/2018    Procedure: Right DaVinci Assisted Nephroureterectomy;  Surgeon: Stuart King MD;  [facility]     ENDOSCOPIC ULTRASOUND LOWER GASTROINTESTIONAL TRACT (GI) N/A 10/30/2015    Procedure: ENDOSCOPIC ULTRASOUND LOWER GASTROINTESTIONAL TRACT (GI);  Surgeon: Daniel Jean-Baptiste MD;  Location: [facility]     EYE SURGERY  12/4/17     INSERT PORT VASCULAR ACCESS Right 12/19/2018    Procedure: Chest Port Placement - right;  Surgeon: Stuart Chavez PA-C;  [facility]     IR CHEST PORT PLACEMENT > 5 YRS OF AGE  12/19/2018     LAPAROSCOPIC CHOLECYSTECTOMY WITH CHOLANGIOGRAMS N/A 11/1/2015    Procedure: LAPAROSCOPIC CHOLECYSTECTOMY WITH CHOLANGIOGRAMS;  Surgeon: Tonie Warren MD;  Location: [facility]     SURGICAL HISTORY OF -   1996    malignant melanoma     SURGICAL HISTORY OF -   1968     thyroid nodule     SURGICAL HISTORY OF -       D & C       Family History   Problem Relation Age of Onset     Cancer Father         dec - esophageal and laryngeal     Heart Disease Mother      Respiratory Mother         dec     Breast Cancer Daughter      Other Cancer Daughter      Thyroid Disease Daughter      Asthma Daughter      Hyperlipidemia Son      Diabetes Son        Social History     Tobacco Use     Smoking status: Never Smoker     Smokeless tobacco: Never Used   Substance Use Topics     Alcohol use: No     Alcohol/week: 0.0 oz     Drug use: No       Patient Supplied Answers to Review of Systems  No flowsheet data found.  The remainder of the 10 point review of systems is otherwise negative.    Physical Examination:  Constitutional:  In no acute distress, appears stated age  Eyes:  Extraocular movements intact, no spontaneous nystagmus  Ears:  Both ears examined and are normal.  Respiratory:  No increased work of breathing, wheezing or stridor  Musculoskeletal:  Good upper extremity strength  Skin:  No rashes on the head and neck  Neurologic:  House Brackmann 1/6 bilaterally, ambulating normally  Psychiatric:  Alert, normal affect, answering questions appropriately    Audiogram: An audiogram was obtained recently and the audiogram in February indicated that the left word recognition score was 64% while the right was 96%.  Her pure-tone average was around 50 dB bilaterally.  She had normal tympanograms and normal reflexes.    Imaging: I reviewed the CT and nuclear medicine study obtained in December.  There was no evidence for a mass lesion in the intracranial space.    Assessment and Plan: An asymmetry of word recognition can be from an acoustic neuroma or other small benign source.  While meningiomas are uncommon, they can occur.  The hearing loss from the cis-platinum is more likely a decline to the 50 dB level.    I feel that she should have an MRI to rule out an acoustic tumor.  If this is  identified, further treatment may or may not be indicated but without knowledge of the tumor, we cannot make any intelligent choices.  I have warned her that carboplatinum toxicity can persist for months and she should be checked at one year.      SL/ms

## 2019-04-10 NOTE — LETTER
4/10/2019      RE: Dimple Oviedo  [address]  [facility] [address]       Reason for Visit:  Dimple Oviedo is seen because of audiology evaluation.      History of Present Illness: She is an 85-year-old female being seen for asymmetric word recognition.  The patient has really not noticed the asymmetry, but recent audiogram was obtained because of her treatment with carboplatinum.  An asymmetric word recognition score was seen on her audiogram.  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  She notices the decline in hearing symmetrically.    Past Medical History:   Diagnosis Date     Calculus of kidney      Chemotherapy-induced neutropenia (H) 3/6/2019     Esophageal reflux      GERD (gastroesophageal reflux disease)      Hyperlipidemia LDL goal <130 5/9/2010     Malignant melanoma of skin of trunk, except scrotum (H)      Nonspecific abnormal finding     has living will 2004 -      Nontoxic multinodular goiter     no further eval /tx rec per pt     Osteopenia      Other psoriasis      Personal history of colonic polyps      PMR (polymyalgia rheumatica) (H)      Stress-induced cardiomyopathy      Undiagnosed cardiac murmurs      Unspecified constipation      Unspecified essential hypertension      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       Past Surgical History:   Procedure Laterality Date     BIOPSY       C NONSPECIFIC PROCEDURE  2005    colonoscopy polyp repeat 2010     COLONOSCOPY  2014     COMBINED CYSTOSCOPY, INSERT STENT URETER(S) Bilateral 9/12/2018    Procedure: COMBINED CYSTOSCOPY, INSERT STENT URETER(S);  Cystoscopy Bilateral ureteral Stent Placement.;  Surgeon: Justin Henry MD;  [facility]     COMBINED CYSTOSCOPY, RETROGRADES, URETEROSCOPY, INSERT STENT Bilateral 4/3/2018    Procedure: COMBINED CYSTOSCOPY, RETROGRADES, URETEROSCOPY, INSERT STENT;;  Surgeon: Stuart King MD;  [facility]     COMBINED  CYSTOSCOPY, RETROGRADES, URETEROSCOPY, INSERT STENT Bilateral 9/10/2018    Procedure: COMBINED CYSTOSCOPY, RETROGRADES, URETEROSCOPY, INSERT STENT;  Cystoscopy, Bilateral Ureteroscopy, Bladder Biopsies, Retrogram Pyelograms, Ureteral Washings and brushings, cysview;  Surgeon: Stuart King MD;  [facility]     CYSTOSCOPY, BIOPSY BLADDER INSTILL OPTICAL AGENT N/A 4/3/2018    Procedure: CYSTOSCOPY, BIOPSY BLADDER INSTILL OPTICAL AGENT;  Cystoscopy, Blue Light Cystoscopy, Bladder Biopsies, Bilateral Selective ureteral washings for Cytology, Bilateral Retrograde Pyelograms, Bilateral Ureteroscopy;  Surgeon: Stuart King MD;  [facility]     CYSTOSCOPY, BIOPSY BLADDER, COMBINED N/A 2/19/2018    Procedure: COMBINED CYSTOSCOPY, BIOPSY BLADDER;  Cystoscopy, Bladder Biopsy;  Surgeon: Kenna La MD;  [facility]     CYSTOSCOPY, REMOVE STENT(S), COMBINED  11/13/2018    Procedure: Flexible Cystoscopy with Stent Removal;  Surgeon: Stuart King MD;  [facility]     DAVINCI LYMPHADENECTOMY N/A 11/13/2018    Procedure: Davinci Lymphadenectomy ;  Surgeon: Stuart King MD;  [facility]     DAVINCI NEPHROURETERECTOMY N/A 11/13/2018    Procedure: Right DaVinci Assisted Nephroureterectomy;  Surgeon: Stuart King MD;  [facility]     ENDOSCOPIC ULTRASOUND LOWER GASTROINTESTIONAL TRACT (GI) N/A 10/30/2015    Procedure: ENDOSCOPIC ULTRASOUND LOWER GASTROINTESTIONAL TRACT (GI);  Surgeon: Daniel Jean-Baptiste MD;  [facility]     EYE SURGERY  12/4/17     INSERT PORT VASCULAR ACCESS Right 12/19/2018    Procedure: Chest Port Placement - right;  Surgeon: Stuart Chavez PA-C;  [facility]     IR CHEST PORT PLACEMENT > 5 YRS OF AGE  12/19/2018     LAPAROSCOPIC CHOLECYSTECTOMY WITH CHOLANGIOGRAMS N/A 11/1/2015    Procedure: LAPAROSCOPIC CHOLECYSTECTOMY WITH CHOLANGIOGRAMS;  Surgeon: Tonie Warren MD;  [facility]      SURGICAL HISTORY OF -   1996    malignant melanoma     SURGICAL HISTORY OF -   1968    thyroid nodule     SURGICAL HISTORY OF -       D & C       Family History   Problem Relation Age of Onset     Cancer Father         dec - esophageal and laryngeal     Heart Disease Mother      Respiratory Mother         dec     Breast Cancer Daughter      Other Cancer Daughter      Thyroid Disease Daughter      Asthma Daughter      Hyperlipidemia Son      Diabetes Son        Social History     Tobacco Use     Smoking status: Never Smoker     Smokeless tobacco: Never Used   Substance Use Topics     Alcohol use: No     Alcohol/week: 0.0 oz     Drug use: No       Patient Supplied Answers to Review of Systems  No flowsheet data found.  The remainder of the 10 point review of systems is otherwise negative.    Physical Examination:  Constitutional:  In no acute distress, appears stated age  Eyes:  Extraocular movements intact, no spontaneous nystagmus  Ears:  Both ears examined and are normal.  Respiratory:  No increased work of breathing, wheezing or stridor  Musculoskeletal:  Good upper extremity strength  Skin:  No rashes on the head and neck  Neurologic:  House Brackmann 1/6 bilaterally, ambulating normally  Psychiatric:  Alert, normal affect, answering questions appropriately    Audiogram: An audiogram was obtained recently and the audiogram in February indicated that the left word recognition score was 64% while the right was 96%.  Her pure-tone average was around 50 dB bilaterally.  She had normal tympanograms and normal reflexes.    Imaging: I reviewed the CT and nuclear medicine study obtained in December.  There was no evidence for a mass lesion in the intracranial space.    Assessment and Plan: An asymmetry of word recognition can be from an acoustic neuroma or other small benign source.  While meningiomas are uncommon, they can occur.  The hearing loss from the cis-platinum is more likely a decline to the 50 dB  level.    I feel that she should have an MRI to rule out an acoustic tumor.  If this is identified, further treatment may or may not be indicated but without knowledge of the tumor, we cannot make any intelligent choices.  I have warned her that carboplatinum toxicity can persist for months and she should be checked at one year.      STACY/ms Papo Hardwick MD

## 2019-04-10 NOTE — LETTER
4/10/2019       RE: Dimple Oviedo  [address]  [facility] [address]     Dear Colleague,    Thank you for referring your patient, Dimple Oviedo, to the [facility] EAR NOSE AND THROAT at [facility]. Please see a copy of my visit note below.    Reason for Visit:  Dimple Oviedo is seen because of audiology evaluation.      History of Present Illness: She is an 85-year-old female being seen for asymmetric word recognition.  The patient has really not noticed the asymmetry, but recent audiogram was obtained because of her treatment with carboplatinum.  An asymmetric word recognition score was seen on her audiogram.  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  She notices the decline in hearing symmetrically.    Past Medical History:   Diagnosis Date     Calculus of kidney      Chemotherapy-induced neutropenia (H) 3/6/2019     Esophageal reflux      GERD (gastroesophageal reflux disease)      Hyperlipidemia LDL goal <130 5/9/2010     Malignant melanoma of skin of trunk, except scrotum (H)      Nonspecific abnormal finding     has living will 2004 -      Nontoxic multinodular goiter     no further eval /tx rec per pt     Osteopenia      Other psoriasis      Personal history of colonic polyps      PMR (polymyalgia rheumatica) (H)      Stress-induced cardiomyopathy      Undiagnosed cardiac murmurs      Unspecified constipation      Unspecified essential hypertension      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       Past Surgical History:   Procedure Laterality Date     BIOPSY       C NONSPECIFIC PROCEDURE  2005    colonoscopy polyp repeat 2010     COLONOSCOPY  2014     COMBINED CYSTOSCOPY, INSERT STENT URETER(S) Bilateral 9/12/2018    Procedure: COMBINED CYSTOSCOPY, INSERT STENT URETER(S);  Cystoscopy Bilateral ureteral Stent Placement.;  Surgeon: Justin Henry MD;  [facility]     COMBINED CYSTOSCOPY,  RETROGRADES, URETEROSCOPY, INSERT STENT Bilateral 4/3/2018    Procedure: COMBINED CYSTOSCOPY, RETROGRADES, URETEROSCOPY, INSERT STENT;;  Surgeon: Stuart King MD;  [facility]     COMBINED CYSTOSCOPY, RETROGRADES, URETEROSCOPY, INSERT STENT Bilateral 9/10/2018    Procedure: COMBINED CYSTOSCOPY, RETROGRADES, URETEROSCOPY, INSERT STENT;  Cystoscopy, Bilateral Ureteroscopy, Bladder Biopsies, Retrogram Pyelograms, Ureteral Washings and brushings, cysview;  Surgeon: Stuart King MD;  [facility]     CYSTOSCOPY, BIOPSY BLADDER INSTILL OPTICAL AGENT N/A 4/3/2018    Procedure: CYSTOSCOPY, BIOPSY BLADDER INSTILL OPTICAL AGENT;  Cystoscopy, Blue Light Cystoscopy, Bladder Biopsies, Bilateral Selective ureteral washings for Cytology, Bilateral Retrograde Pyelograms, Bilateral Ureteroscopy;  Surgeon: Stuart King MD;  [facility]     CYSTOSCOPY, BIOPSY BLADDER, COMBINED N/A 2/19/2018    Procedure: COMBINED CYSTOSCOPY, BIOPSY BLADDER;  Cystoscopy, Bladder Biopsy;  Surgeon: Kenna La MD;  [facility]     CYSTOSCOPY, REMOVE STENT(S), COMBINED  11/13/2018    Procedure: Flexible Cystoscopy with Stent Removal;  Surgeon: Stuart King MD;  [facility]     DAVINCI LYMPHADENECTOMY N/A 11/13/2018    Procedure: Davinci Lymphadenectomy ;  Surgeon: Stuart King MD;  [facility]     DAVINCI NEPHROURETERECTOMY N/A 11/13/2018    Procedure: Right DaVinci Assisted Nephroureterectomy;  Surgeon: Stuart King MD;  [facility]     ENDOSCOPIC ULTRASOUND LOWER GASTROINTESTIONAL TRACT (GI) N/A 10/30/2015    Procedure: ENDOSCOPIC ULTRASOUND LOWER GASTROINTESTIONAL TRACT (GI);  Surgeon: Daniel Jean-Baptiste MD;  [facility]     EYE SURGERY  12/4/17     INSERT PORT VASCULAR ACCESS Right 12/19/2018    Procedure: Chest Port Placement - right;  Surgeon: Stuart Chavez PA-C;  [facility]     IR CHEST PORT PLACEMENT > 5 YRS  OF AGE  12/19/2018     LAPAROSCOPIC CHOLECYSTECTOMY WITH CHOLANGIOGRAMS N/A 11/1/2015    Procedure: LAPAROSCOPIC CHOLECYSTECTOMY WITH CHOLANGIOGRAMS;  Surgeon: Tonie Warren MD;  Location:  OR     SURGICAL HISTORY OF -   1996    malignant melanoma     SURGICAL HISTORY OF -   1968    thyroid nodule     SURGICAL HISTORY OF -       D & C       Family History   Problem Relation Age of Onset     Cancer Father         dec - esophageal and laryngeal     Heart Disease Mother      Respiratory Mother         dec     Breast Cancer Daughter      Other Cancer Daughter      Thyroid Disease Daughter      Asthma Daughter      Hyperlipidemia Son      Diabetes Son        Social History     Tobacco Use     Smoking status: Never Smoker     Smokeless tobacco: Never Used   Substance Use Topics     Alcohol use: No     Alcohol/week: 0.0 oz     Drug use: No       Patient Supplied Answers to Review of Systems  No flowsheet data found.  The remainder of the 10 point review of systems is otherwise negative.    Physical Examination:  Constitutional:  In no acute distress, appears stated age  Eyes:  Extraocular movements intact, no spontaneous nystagmus  Ears:  Both ears examined and are normal.  Respiratory:  No increased work of breathing, wheezing or stridor  Musculoskeletal:  Good upper extremity strength  Skin:  No rashes on the head and neck  Neurologic:  House Brackmann 1/6 bilaterally, ambulating normally  Psychiatric:  Alert, normal affect, answering questions appropriately    Audiogram: An audiogram was obtained recently and the audiogram in February indicated that the left word recognition score was 64% while the right was 96%.  Her pure-tone average was around 50 dB bilaterally.  She had normal tympanograms and normal reflexes.    Imaging: I reviewed the CT and nuclear medicine study obtained in December.  There was no evidence for a mass lesion in the intracranial space.    Assessment and Plan: An asymmetry of word  recognition can be from an acoustic neuroma or other small benign source.  While meningiomas are uncommon, they can occur.  The hearing loss from the cis-platinum is more likely a decline to the 50 dB level.    I feel that she should have an MRI to rule out an acoustic tumor.  If this is identified, further treatment may or may not be indicated but without knowledge of the tumor, we cannot make any intelligent choices.  I have warned her that carboplatinum toxicity can persist for months and she should be checked at one year.      SL/ms      Again, thank you for allowing me to participate in the care of your patient.      Sincerely,    Papo Hardwick MD

## 2019-04-17 PROCEDURE — 25000128 H RX IP 250 OP 636: Performed by: INTERNAL MEDICINE

## 2019-04-17 PROCEDURE — 96523 IRRIG DRUG DELIVERY DEVICE: CPT

## 2019-04-17 RX ORDER — HEPARIN SODIUM (PORCINE) LOCK FLUSH IV SOLN 100 UNIT/ML 100 UNIT/ML
5 SOLUTION INTRAVENOUS ONCE
Status: COMPLETED | OUTPATIENT
Start: 2019-04-17 | End: 2019-04-17

## 2019-04-17 RX ADMIN — HEPARIN 5 ML: 100 SYRINGE at 11:28

## 2019-04-17 NOTE — NURSING NOTE
Chief Complaint   Patient presents with     Port Flush     port flushed by RN     There were no vitals taken for this visit.    Port accessed by RN in lab.No lab orders.  Line flushed with NS & Heparin. Pt tolerated well.     Gaby Valero RN

## 2019-04-29 ENCOUNTER — ANCILLARY PROCEDURE (OUTPATIENT)
Dept: MRI IMAGING | Facility: CLINIC | Age: 84
End: 2019-04-29
Attending: OTOLARYNGOLOGY
Payer: MEDICARE

## 2019-04-29 DIAGNOSIS — H90.3 ASYMMETRICAL SENSORINEURAL HEARING LOSS: ICD-10-CM

## 2019-04-29 RX ORDER — GADOBUTROL 604.72 MG/ML
7.5 INJECTION INTRAVENOUS ONCE
Status: COMPLETED | OUTPATIENT
Start: 2019-04-29 | End: 2019-04-29

## 2019-04-29 RX ORDER — HEPARIN SODIUM (PORCINE) LOCK FLUSH IV SOLN 100 UNIT/ML 100 UNIT/ML
5 SOLUTION INTRAVENOUS ONCE
Status: COMPLETED | OUTPATIENT
Start: 2019-04-29 | End: 2019-04-29

## 2019-04-29 RX ADMIN — GADOBUTROL 6.5 ML: 604.72 INJECTION INTRAVENOUS at 13:09

## 2019-04-29 RX ADMIN — HEPARIN SODIUM (PORCINE) LOCK FLUSH IV SOLN 100 UNIT/ML 5 ML: 100 SOLUTION at 13:57

## 2019-04-29 NOTE — DISCHARGE INSTRUCTIONS
MRI Contrast Discharge Instructions    The IV contrast you received today will pass out of your body in your  urine. This will happen in the next 24 hours. You will not feel this process.  Your urine will not change color.    Drink at least 4 extra glasses of water or juice today (unless your doctor  has restricted your fluids). This reduces the stress on your kidneys.  You may take your regular medicines.    If you are on dialysis: It is best to have dialysis today.    If you have a reaction: Most reactions happen right away. If you have  any new symptoms after leaving the hospital (such as hives or swelling),  call your hospital at the correct number below. Or call your family doctor.  If you have breathing distress or wheezing, call 911.    Special instructions: ***    I have read and understand the above information.    Signature:______________________________________ Date:___________    Staff:__________________________________________ Date:___________     Time:__________    [facility] Departments:    ___[place]: [phone]  ___[facility]: [phone]  ___[place]: [phone] ___[facility]: [phone]  ___[facility]: [phone]  ___[facility]: [phone]  ___[phone]  ___[facility]: [phone]  ___[phone]

## 2019-05-01 ENCOUNTER — TELEPHONE (OUTPATIENT)
Dept: FAMILY MEDICINE | Facility: CLINIC | Age: 84
End: 2019-05-01

## 2019-05-01 DIAGNOSIS — I89.0 LYMPHEDEMA OF BOTH LOWER EXTREMITIES: ICD-10-CM

## 2019-05-01 NOTE — TELEPHONE ENCOUNTER
Reason for Call:  Other prescription    Detailed comments: Patient called to request a script for compression socks. She was told by the [facility] that she needs to be wearing these all the time. YUSUF compression socks 20-30 size medium    Phone Number Patient can be reached at: Home number on file [phone] (home)    Best Time: anytime    Can we leave a detailed message on this number? YES    Call taken on 5/1/2019 at 3:22 PM by Nikole Baldwin

## 2019-05-02 NOTE — TELEPHONE ENCOUNTER
FUTURE VISIT INFORMATION      FUTURE VISIT INFORMATION:    Date: 6/3/19    Time: 2:20pm    Location: [facility]  REFERRAL INFORMATION:    Referring provider:  Dr. Papo Hardwick    Referring providers clinic:  [facility]    Reason for visit/diagnosis:  Hoarseness    RECORDS REQUESTED FROM:       Clinic name Comments Records Status Imaging Status   [facility] 4/10/19 - Office Visit - Dr. Papo Hardwick Epic

## 2019-05-02 NOTE — TELEPHONE ENCOUNTER
"Routing to provider - Curry - please review and advise as appropriate    DME Order request: Compression stockings    I pended previous DME order     Patient has a previous order within the last year with refills - I called to clarify if she picked up all three pairs - from her communication it sounds like she hasn't - she wants to use a new medical supply store and doesn't want to contact previous store \"I don't want to go al the way out there again\" - we have no information about the new supply store she wants to use so I discussed she will need to get that information as well and provide that to the clinic    Thank you,  Kindra Daigle RN    "

## 2019-05-03 NOTE — TELEPHONE ENCOUNTER
Pt called back to give fax number for [facility] to send compression stocking orders.     FAX:[phone]    Nikole QUIROGA     [facility]

## 2019-05-03 NOTE — TELEPHONE ENCOUNTER
Faxed 5/3/2019 - placed order in provider basket    Closing encounter - no further actions needed at this time    Kindra Daigle RN

## 2019-05-03 NOTE — TELEPHONE ENCOUNTER
[facility] calling stating that they did not receive script for compression socks.   Please re-fax. Fax number below is correct.           Nikole QUIROGA     [facility]

## 2019-05-06 ENCOUNTER — MYC MEDICAL ADVICE (OUTPATIENT)
Dept: OTOLARYNGOLOGY | Facility: CLINIC | Age: 84
End: 2019-05-06

## 2019-05-06 DIAGNOSIS — H90.3 ASYMMETRICAL SENSORINEURAL HEARING LOSS: Primary | ICD-10-CM

## 2019-05-06 DIAGNOSIS — Z79.899 HIGH RISK MEDICATION USE: ICD-10-CM

## 2019-05-06 DIAGNOSIS — M85.80 OSTEOPENIA, UNSPECIFIED LOCATION: ICD-10-CM

## 2019-05-06 NOTE — TELEPHONE ENCOUNTER
"Requested Prescriptions   Pending Prescriptions Disp Refills     alendronate (FOSAMAX) 70 MG tablet [Pharmacy Med Name: ALENDRONATE SODIUM 70 MG Tablet] 12 tablet 1     Sig: TAKE 1 TABLET EVERY 7 DAYS AT LEAST 60 MINUTES BEFORE BREAKFAST AS DIRECTED. SEE PACKAGE FOR ADDITIONAL INSTRUCTIONS  Last Written Prescription Date:  12/29/2018  Last Fill Quantity: 12 tablet,  # refills: 1   Last Office Visit: 1/16/2019   Future Office Visit:            Bisphosphonates Failed - 5/6/2019  2:48 PM        Failed - Dexa on file within past 2 years     Please review last Dexa result.           Passed - Recent (12 mo) or future (30 days) visit within the authorizing provider's specialty     Patient had office visit in the last 12 months or has a visit in the next 30 days with authorizing provider or within the authorizing provider's specialty.  See \"Patient Info\" tab in inbasket, or \"Choose Columns\" in Meds & Orders section of the refill encounter.            Passed - Medication is active on med list        Passed - Patient is age 18 or older        Passed - Normal serum creatinine on file within past 12 months     Recent Labs   Lab Test 03/06/19  1010   CR 1.02               "

## 2019-05-07 RX ORDER — ALENDRONATE SODIUM 70 MG/1
TABLET ORAL
Qty: 0.01 TABLET | Refills: 0 | OUTPATIENT
Start: 2019-05-07

## 2019-05-08 NOTE — TELEPHONE ENCOUNTER
Refused Prescriptions:                       Disp   Refills    alendronate (FOSAMAX) 70 MG tablet [Pharma*0.01 t*0        Sig: TAKE 1 TABLET EVERY 7 DAYS AT LEAST 60 MINUTES BEFORE           BREAKFAST AS DIRECTED. SEE PACKAGE FOR ADDITIONAL           INSTRUCTIONS  Refused By: RADHA BUSBY  Reason for Refusal: Duplicate    Closing encounter - no further actions needed at this time  Radha Busby RN

## 2019-05-09 NOTE — TELEPHONE ENCOUNTER
Patient's daughter in law called requesting for the results MRI that was done on 4/29. Day states that the provider that ordered the MRI retired. Please see IMT message below and advise, thank you!    *Consent to communicate on file with martir Segal to [facility] [phone]*    Domonique Kendall

## 2019-05-09 NOTE — TELEPHONE ENCOUNTER
Reviewed MRI results with Dr. Hardwick's colleague, Dr. Alarcon.  Call was placed to patient's daughter, Day to relay MRI results.  Explained there was no evidence of a vestibular schwanomma and discussed ischemic small vessel disease.  Day had no follow up questions and encouraged to have patient follow up with audiogram in one year.      Robb Pina RN

## 2019-05-24 NOTE — PROGRESS NOTES
Subjective     Dimple Oviedo is a 85 year old female who presents to clinic today for the following health issues:  Bilateral knee pain.  Feels like it is getting worse.  Previous cortisone shot was helpful.  Would like to get injection both knees.    /64 (BP Location: Right arm, Patient Position: Chair, Cuff Size: Adult Regular)   Pulse 55   Temp 97.8  F (36.6  C) (Oral)   Resp 16   Wt 67.8 kg (149 lb 8 oz)   SpO2 100%   BMI 32.34 kg/m    Knee -both knee medial joint line hypertrophy and tenderness.     Discussed options for treatment of knee OA including oral medication, joint injection, synvisc and surgery. Pt is failing current oral meds and would like injection today. We discussed complications with current procedure including the risks of infection of the joint, bleeding in joint, pain flare up, and not being effective.     RT knee corticosteroid injection  After risks, benefits and complications of steroid injection were discussed with the patient he elected to proceed.  Using sterile technique, the area was first prepped with alcohol swab and chlorprep..  Topical ethyl chloride was used as local.  A 22 gauge, 1 1/2 inch needle was used to inject 40 mg kenalong(1ml) and 4cc of 1% lidocaine each into the knee joint using an anterolateral joint line approach on the RT side.  Pt.  tolerated the procedure well without complications.  Post injection instructions given.           LT knee corticosteroid injection  After risks, benefits and complications of steroid injection were discussed with the patient he elected to proceed.  Using sterile technique, the area was first prepped with alcohol swab and chlorprep..  Topical ethyl chloride was used as local.  A 22 gauge, 1 1/2 inch needle was used to inject 40 mg kenalong(1ml) and 4cc of 1% lidocaine each into the knee joint using an anterolateral joint line approach on the LT side.  Pt.  tolerated the procedure well without complications.  Post injection  instructions given.      (M17.0) Osteoarthritis of both knees, unspecified osteoarthritis type  (primary encounter diagnosis)  Comment: Home care discussed.  Will consider repeating an x-ray if symptoms are worsening.  Reviewed her previous x-ray.  Plan: DRAIN/INJECT LARGE JOINT/BURSA, triamcinolone         (KENALOG-40) 40 MG/ML injection, TRIAMCINOLONE         ACET INJ NOS, DRAIN/INJECT LARGE JOINT/BURSA,         triamcinolone (KENALOG-40) 40 MG/ML injection,         TRIAMCINOLONE ACET INJ NOS

## 2019-05-28 ENCOUNTER — OFFICE VISIT (OUTPATIENT)
Dept: FAMILY MEDICINE | Facility: CLINIC | Age: 84
End: 2019-05-28
Payer: MEDICARE

## 2019-05-28 VITALS
OXYGEN SATURATION: 100 % | SYSTOLIC BLOOD PRESSURE: 132 MMHG | TEMPERATURE: 97.8 F | RESPIRATION RATE: 16 BRPM | BODY MASS INDEX: 32.34 KG/M2 | DIASTOLIC BLOOD PRESSURE: 64 MMHG | HEART RATE: 55 BPM | WEIGHT: 149.5 LBS

## 2019-05-28 DIAGNOSIS — M17.0 OSTEOARTHRITIS OF BOTH KNEES, UNSPECIFIED OSTEOARTHRITIS TYPE: Primary | ICD-10-CM

## 2019-05-28 PROCEDURE — 99207 ZZC DROP WITH A PROCEDURE: CPT | Mod: 25 | Performed by: FAMILY MEDICINE

## 2019-05-28 PROCEDURE — 20610 DRAIN/INJ JOINT/BURSA W/O US: CPT | Mod: 50 | Performed by: FAMILY MEDICINE

## 2019-05-28 RX ORDER — TRIAMCINOLONE ACETONIDE 40 MG/ML
40 INJECTION, SUSPENSION INTRA-ARTICULAR; INTRAMUSCULAR ONCE
Qty: 1 ML | Refills: 0 | OUTPATIENT
Start: 2019-05-28 | End: 2019-06-12

## 2019-05-28 RX ORDER — FUROSEMIDE 20 MG
20 TABLET ORAL EVERY MORNING
Qty: 90 TABLET | Refills: 1 | Status: CANCELLED | OUTPATIENT
Start: 2019-05-28

## 2019-05-28 RX ORDER — ALENDRONATE SODIUM 70 MG/1
TABLET ORAL
Qty: 12 TABLET | Refills: 3 | Status: SHIPPED | OUTPATIENT
Start: 2019-05-28 | End: 2020-05-13

## 2019-05-28 NOTE — PATIENT INSTRUCTIONS
1. Take it easy today. Use Ice on your knee if it is swollen or having pain.   Ok to take tylenol and ibuprofen if you need to.  2. You will know if the next 7 days if the injection is helpful. You can have a knee injection up to once every 4 months. If it is not helping your pain, please contact your doctor for follow up.  3. Watch for any signs of infection-- fever, chills, redness around the injection site. Please contact us or the doctor on call if any of these develop.

## 2019-05-30 ENCOUNTER — OFFICE VISIT (OUTPATIENT)
Dept: UROLOGY | Facility: CLINIC | Age: 84
End: 2019-05-30
Payer: MEDICARE

## 2019-05-30 VITALS
BODY MASS INDEX: 31.28 KG/M2 | HEART RATE: 48 BPM | HEIGHT: 58 IN | DIASTOLIC BLOOD PRESSURE: 67 MMHG | SYSTOLIC BLOOD PRESSURE: 162 MMHG | WEIGHT: 149 LBS

## 2019-05-30 DIAGNOSIS — C64.1 UROTHELIAL CARCINOMA OF KIDNEY, RIGHT (H): Primary | ICD-10-CM

## 2019-05-30 ASSESSMENT — MIFFLIN-ST. JEOR: SCORE: 1010.61

## 2019-05-30 ASSESSMENT — PAIN SCALES - GENERAL: PAINLEVEL: NO PAIN (0)

## 2019-05-30 NOTE — PATIENT INSTRUCTIONS
Follow up with Dr. King in 3-4 months.      It was a pleasure meeting with you today.  Thank you for allowing me and my team the privilege of caring for you today.  YOU are the reason we are here, and I truly hope we provided you with the excellent service you deserve.  Please let us know if there is anything else we can do for you so that we can be sure you are leaving completely satisfied with your care experience.

## 2019-05-30 NOTE — PROGRESS NOTES
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 Right nephroureterectomy on 2018.     She recently started chemotherapy (gem/carbo) on 18. She has been tolerating this fairly well. She completed her chemo therapy. The patient states she is doing well.     Creatinine   Date Value Ref Range Status   2019 1.02 0.52 - 1.04 mg/dL Final        2. Bilateral pulmonary nodules measuring up to 4 mm in the right lower lobe, previously measuring 8 mm. No new or enlarging pulmonary nodules. (CT from 2019 to 2019).     3. Retro caval lymph node    This looks much smaller on my read from the 2019 CT.     --She is scheduled for a CT scan and follow up with Dr. Toledo on the   --F/U in 2019, oncology is ordering imaging and labs    Attestation:  This patient was seen and evaluated by me, with a scribe taking notes.  I have reviewed the note above and agree.  The physical exam and or any procedures were performed by me and the pertinant details are outlined below.       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 The patient is following wit Dr. Toledo of Hematology and Oncology. Per Dr. Toledo's note, on 18 Mr. Oviedo- Started adjuvant chemotherapy (carbo+gem) per POUT trial. Cycle 2 - 19. Cycle 3 - 19. Cycle 4 - 19.    Today, the patient states she is doing well. She has her taste buds back. The patient denies  "pain (besides bilateral knee pain). The patient has a CT scan and has a follow up with Dr. Toledo. The patient denies hematuria.     Tolerating ORAL and good bowel function     Date of last abdominal and pelvis imaging: pre-op PET 12/11/18  Date of last chest imaging: pre-op PET 12/11/18  Any recurrence? N/A    Pathology (11/13/2018):    Patient Name: QUIN VALDEZ   MR#: [id number]   Specimen #: [id number]   Collected: 11/13/2018   Received: 11/13/2018   Reported: 11/20/2018 17:32   Ordering Phy(s): ADAM RODRIGUEZ     For improved result formatting, select 'View Enhanced Report Format' under    Linked Documents section.     ORIGINAL REPORT:     SPECIMEN(S):   A: Ana-caval mass   B: Ana-caval lymph nodes   C: Right kidney and ureter     FINAL DIAGNOSIS:   A. Ana-caval mass, excision:   - Metastatic urothelial carcinoma involving one lymph node     B. Ana-caval lymph nodes, excision:   - Five benign lymph nodes     C. Right kidney and ureter, nephroureterectomy:   - Invasive high grade urothelial carcinoma   - Tumor size: 3.5 cm   - Tumor location: Renal pelvis   - Tumor extent: Invades through renal parenchyma into perinephric fat   - Margins: Free of tumor   - Urothelial carcinoma in-situ present   - Pathologic stage: pT4N1     Review of Systems:   Pertinent items are noted in HPI or as below, remainder of complete ROS is negative.      Physical Exam:   /67   Pulse (!) 48   Ht 1.473 m (4' 10\")   Wt 67.6 kg (149 lb)   BMI 31.14 kg/m     Body mass index is 31.14 kg/m .  General: Alert, no acute distress, oriented  HENT: Good dentition  Lungs: No respiratory distress, or pursed lip breathing  Heart: No obvious jugular venous distension present  Abdomen: Soft, nontender, no organomegaly or masses. No sign of hernia or bulge.   Musculoskeletal: Extremities normal, no peripheral edema  Skin: No suspicious lesions or rashes  Neuro: Alert, oriented, speech and mentation normal  Psych: Affect and mood " normal  Gait: Normal  : deferred    Laboratory:   I reviewed all applicable laboratory and pathology data and went over findings with patient  Significant for     Lab Results   Component Value Date    CR 1.02 03/06/2019    CR 1.00 02/13/2019    CR 1.18 02/11/2019    CR 0.98 02/07/2019    CR 0.99 02/03/2019    CR 0.97 01/23/2019    CR 0.97 01/02/2019    CR 1.06 12/26/2018    CR 1.16 12/12/2018    CR 1.05 11/15/2018       Results for orders placed or performed during the hospital encounter of 12/26/18   UA with Microscopic   Result Value Ref Range    Color Urine Light Yellow     Appearance Urine Clear     Glucose Urine Negative NEG^Negative mg/dL    Bilirubin Urine Negative NEG^Negative    Ketones Urine Negative NEG^Negative mg/dL    Specific Gravity Urine 1.008 1.003 - 1.035    Blood Urine Trace (A) NEG^Negative    pH Urine 5.5 5.0 - 7.0 pH    Protein Albumin Urine Negative NEG^Negative mg/dL    Urobilinogen mg/dL Normal 0.0 - 2.0 mg/dL    Nitrite Urine Negative NEG^Negative    Leukocyte Esterase Urine Negative NEG^Negative    Source Catheterized Urine     WBC Urine 0 0 - 5 /HPF    RBC Urine 0 0 - 2 /HPF     Results for orders placed or performed in visit on 03/14/12   LDH: Lactate dehydrogenase, total   Result Value Ref Range     325 - 750 U/L   ]   Imaging:   I personally reviewed all applicable imaging and went over the below findings with patient.    Results for orders placed or performed in visit on 04/29/19   MR Brain w/o & w Contrast    Narrative    MR BRAIN W/O & W CONTRAST 4/29/2019 2:06 PM    Provided History:  Please evaluate for right acoustic neuroma  ICD-10: Asymmetrical sensorineural hearing loss    Comparison: None    Technique: Axial diffusion-weighted with ADC map, T2-weighted,  turboFLAIR and T1-weighted images of the brain and axial T1-weighted  and coronal T2-weighted with fat saturation images centered on the  internal auditory canals were obtained without intravenous  "contrast.  Following intravenous administration of gadolinium, axial turboFLAIR  images of the brain and axial T1-weighted with fat saturation and  coronal T1-weighted images centered on the internal auditory canals  were obtained.    Contrast: 6.5mL Gadavist    Findings: No abnormal signal or enhancement along the course of the  seventh and eighth cranial nerves on either side. Vestibular and  auditory structures exhibit normal signal on T2-weighted images and no  abnormal enhancement.    Images of the whole brain demonstrate no mass lesion, no mass effect,  or midline shift. There are multiple T2 hyperintensities throughout  bilateral subcortical and periventricular white matter which are  nonspecific however likely represent small vessel ischemic disease.  Mild to moderate global cerebral atrophy, appropriate for age. 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 No intracranial hemorrhage on  susceptibility-weighted images. There is no restricted diffusion.  Ventricles are proportionate to the cerebral sulci. No abnormal  enhancement.      Impression    Impression:    1. No evidence of vestibular schwannoma.  2. Mild chronic small vessel ischemic disease and cerebral volume  loss.    I have personally reviewed the examination and initial interpretation  and I agree with the findings.    REDDY ARANA MD     3/1/2019- CT C/A/P with contrast - \"1. Bilateral pulmonary nodules measuring up to 4 mm in the right lower lobe, previously measuring 8 mm. No new or enlarging pulmonary nodules. 2. No convincing evidence for metastatic disease in the abdomen, pelvis and bones.\"    1/22/19 - CT C/A/P with contrast compared with prior PET/CT: \"1. New well-circumscribed soft tissue pulmonary nodules of the right lower lobe and left upper lobe. Findings could be infectious, inflammatory, or " "represent metastatic disease. Continued attention on follow-up recommended. Postoperative changes of right nephrectomy. No evidence for local recurrence in the present study.\"    Scribe Disclosure:  I, Efren Kevin, am serving as a scribe to document services personally performed by Stuart King MD at this visit, based upon the provider's statements to me. All documentation has been reviewed by the aforementioned provider prior to being entered into the official medical record.     CC  Patient Care Team:  Pop Zapien MD as PCP - General (Family Practice)  Crista Richards, APRN CNP as PCP - Cardiology (Nurse Practitioner)  Vincenzo Tovar MD as MD (Family Medicine - Sports Medicine)  Candelaria Raymundo MD as MD (Gastroenterology)  Shavon Bustamante PA-C as Physician Assistant (Physician Assistant)  Kenna La MD as MD (Urology)  Cristiana Correa, RN as Registered Nurse (Urology)  Pop Zapien MD as Referring Physician (Family Practice)  Kiera Figueroa, ATUL as Registered Nurse (Urology)  Stuart King MD as MD (Urology)  Kiera Figueroa, RN as Registered Nurse (Urology)  Geovanna Fregoso PA as Physician Assistant (Urology)  Jaden Toledo MD as MD (Internal Medicine-Hematology & Oncology)  Rose Estrella, RN as Nurse Coordinator (Oncology)  Pop Zapien MD as Assigned PCP  SELF, REFERRED    Copy to patient  QUIN VALDEZ  [address] [place] [phone]    "

## 2019-05-30 NOTE — NURSING NOTE
"Return-kidney Cancer F/U    She denies any urinary and kidney pain or sx.      Chief Complaint   Patient presents with     RECHECK     Kidney Cancer F/U       Blood pressure 162/67, pulse (!) 48, height 1.473 m (4' 10\"), weight 67.6 kg (149 lb), not currently breastfeeding. Body mass index is 31.14 kg/m .    Patient Active Problem List   Diagnosis     Esophageal reflux     Restless leg syndrome     heat intolerance     Goiter     Disorder of bone and cartilage     Other psoriasis     perirectal cyst     Malignant melanoma of skin of trunk, except scrotum (H)     Nontoxic multinodular goiter     Hyperlipidemia LDL goal <130     Hypertension goal BP (blood pressure) < 140/90     Urinary incontinence     Osteoarthritis of both knees     Hip arthritis     Polymyalgia rheumatica (H)     High risk medication use     Shoulder pain     Impaired fasting blood sugar     Chronic bilateral low back pain without sciatica     Obesity, unspecified obesity severity, unspecified obesity type     Iron deficiency anemia, unspecified iron deficiency anemia type     NSTEMI (non-ST elevated myocardial infarction) (H)     Stress-induced cardiomyopathy     A-fib (H)     Anxiety     Chronic systolic heart failure (H)     Urothelial carcinoma (H)     Hyperthyroidism     Restless legs syndrome     CRESENCIO (acute kidney injury) (H)     Hydronephrosis     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)     Graves disease     Encounter for antineoplastic chemotherapy     Chemotherapy-induced neutropenia (H)       Allergies   Allergen Reactions     Codeine        Current Outpatient Medications   Medication Sig Dispense Refill     acetaminophen (TYLENOL) 650 MG CR tablet Take 1 tablet (650 mg) by mouth every 8 hours as needed for pain 100 tablet 0     alendronate (FOSAMAX) 70 MG tablet TAKE 1 TABLET EVERY 7 DAYS AT LEAST 60 MIN BEFORE BREAKFAST AS DIRECTED \"SEE PACKAGE FOR ADDITIONAL INSTRUCTIONS\" 12 tablet 3     aspirin 81 MG tablet Take 81 mg by " mouth every evening        Calcium Citrate-Vitamin D (CALCIUM + D PO) Take 1 tablet by mouth 2 times daily        cycloSPORINE (RESTASIS) 0.05 % ophthalmic emulsion Place 1 drop into both eyes 2 times daily       ferrous sulfate 325 (65 Fe) MG TBEC EC tablet Take 325 mg by mouth daily       furosemide (LASIX) 20 MG tablet TAKE 1 TABLET (20 MG) BY MOUTH EVERY MORNING 90 tablet 1     irbesartan (AVAPRO) 300 MG tablet TAKE 1 TABLET EVERY DAY 90 tablet 3     lidocaine-prilocaine (EMLA) 2.5-2.5 % external cream Apply topically as needed for moderate pain 30 g 0     lovastatin (MEVACOR) 40 MG tablet TAKE 1 TABLET AT BEDTIME (HYPERLIPIDEMIA LDL GOAL BELOW 130) 90 tablet 2     methimazole (TAPAZOLE) 5 MG tablet 10 mg alternating with 15 mg every other day 225 tablet 3     nitroGLYcerin (NITROSTAT) 0.4 MG sublingual tablet For chest pain place 1 tablet under the tongue every 5 minutes for 3 doses. If symptoms persist 5 minutes after 1st dose call 911. 25 tablet 3     Omega-3 Fatty Acids (FISH OIL) 500 MG CAPS Take 1 capsule by mouth 2 times daily        omeprazole (PRILOSEC) 20 MG DR capsule TAKE 1 CAPSULE EVERY DAY 90 capsule 2     ondansetron (ZOFRAN) 8 MG tablet Take 1 tablet (8 mg) by mouth every 8 hours as needed (Nausea/Vomiting) 30 tablet 5     order for DME Equipment being ordered: Knee high compression for B LE 20-30mmHg, Velcro units for night time (consider hybrid sock as foot swelling is less than leg swelling), Donning device    Size: Medium 2 each 3     polyethylene glycol (MIRALAX/GLYCOLAX) packet Take 17 g by mouth       Probiotic Product (PROBIOTIC ADVANCED PO) Take 1 capsule by mouth every morning        prochlorperazine (COMPAZINE) 10 MG tablet Take 0.5 tablets (5 mg) by mouth every 6 hours as needed (Nausea/Vomiting - take if Zofran doesn't work after 30 mins.) 30 tablet 5     propranolol ER (INDERAL LA) 60 MG 24 hr capsule TAKE 1 CAPSULE EVERY DAY 90 capsule 0     rOPINIRole (REQUIP) 0.25 MG tablet 0.25  mg in the afternoon and 0.5 mg at night 270 tablet 1     senna-docusate (SENOKOT-S;PERICOLACE) 8.6-50 MG per tablet Take 1 tablet by mouth 2 times daily To prevent constipation 60 tablet 0     sertraline (ZOLOFT) 100 MG tablet TAKE 1 TABLET (100 MG) BY MOUTH EVERY MORNING 90 tablet 1     traZODone (DESYREL) 50 MG tablet TAKE 1/2 TABLET(25 MG)  AT BEDTIME 45 tablet 0       Social History     Tobacco Use     Smoking status: Never Smoker     Smokeless tobacco: Never Used   Substance Use Topics     Alcohol use: No     Alcohol/week: 0.0 oz     Drug use: No       Edward Wright, EMT  5/30/2019  1:08 PM

## 2019-05-30 NOTE — LETTER
2019       RE: Dimple Oviedo  [address]  [facility] [id number]     Dear Colleague,    Thank you for referring your patient, Dimple Oviedo, to the [facility] UROLOGY AND INST FOR PROSTATE AND UROLOGIC CANCERS at [facility]. Please see a copy of my visit note below.      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 Right nephroureterectomy on 2018.     She recently started chemotherapy (gem/carbo) on 18. She has been tolerating this fairly well.  She completed her chemo therapy. The patient states she is doing well.     Creatinine   Date Value Ref Range Status   2019 1.02 0.52 - 1.04 mg/dL Final        2. Bilateral pulmonary nodules measuring up to 4 mm in the right lower lobe, previously measuring 8 mm. No new or enlarging pulmonary nodules. (CT from 2019 to 2019).     3. Retro caval lymph node    This looks much smaller on my read from the 2019 CT.     --She is scheduled for a CT scan and follow up with Dr. Toledo on the   --F/U in 2019, oncology is ordering imaging and labs    Attestation:  This patient was seen and evaluated by me, with a scribe taking notes.  I have reviewed the note above and agree.  The physical exam and or any procedures were performed by me and the pertinant details are outlined below.       Stuart King MD  Department of Urology  [facility]    ______________________________________________________________________    HPI  Dimple Oviedo is a 85 year old female with a history of high grade upper tract urothelial cancer  (pT4N1) here for follow up after right nephroureterectomy  "on 11/13/18. The patient is following yolanda Toledo of Hematology and Oncology. Per Dr. Toledo's note, on 12/12/18 Mr. Valdez- Started adjuvant chemotherapy (carbo+gem) per POUT trial. Cycle 2 - 1/2/19. Cycle 3 - 1/23/19. Cycle 4 - 02/13/19.    Today, the patient states she is doing well. She has her taste buds back. The patient denies pain (besides bilateral knee pain). The patient has a CT scan and has a follow up with Dr. Toledo. The patient denies hematuria.     Tolerating ORAL and good bowel function     Date of last abdominal and pelvis imaging: pre-op PET 12/11/18  Date of last chest imaging: pre-op PET 12/11/18  Any recurrence? N/A    Pathology (11/13/2018):    Patient Name: QUIN VALDEZ   MR#: [id number]   Specimen #: [id number]   Collected: 11/13/2018   Received: 11/13/2018   Reported: 11/20/2018 17:32   Ordering Phy(s): ADAM RODRIGUEZ     For improved result formatting, select 'View Enhanced Report Format' under    Linked Documents section.     ORIGINAL REPORT:     SPECIMEN(S):   A: Ana-caval mass   B: Ana-caval lymph nodes   C: Right kidney and ureter     FINAL DIAGNOSIS:   A. Ana-caval mass, excision:   - Metastatic urothelial carcinoma involving one lymph node     B. Ana-caval lymph nodes, excision:   - Five benign lymph nodes     C. Right kidney and ureter, nephroureterectomy:   - Invasive high grade urothelial carcinoma   - Tumor size: 3.5 cm   - Tumor location: Renal pelvis   - Tumor extent: Invades through renal parenchyma into perinephric fat   - Margins: Free of tumor   - Urothelial carcinoma in-situ present   - Pathologic stage: pT4N1     Review of Systems:   Pertinent items are noted in HPI or as below, remainder of complete ROS is negative.      Physical Exam:   /67   Pulse (!) 48   Ht 1.473 m (4' 10\")   Wt 67.6 kg (149 lb)   BMI 31.14 kg/m      Body mass index is 31.14 kg/m .  General: Alert, no acute distress, oriented  HENT: Good dentition  Lungs: No respiratory distress, " or pursed lip breathing  Heart: No obvious jugular venous distension present  Abdomen: Soft, nontender, no organomegaly or masses. No sign of hernia or bulge.   Musculoskeletal: Extremities normal, no peripheral edema  Skin: No suspicious lesions or rashes  Neuro: Alert, oriented, speech and mentation normal  Psych: Affect and mood normal  Gait: Normal  : deferred    Laboratory:   I reviewed all applicable laboratory and pathology data and went over findings with patient  Significant for     Lab Results   Component Value Date    CR 1.02 03/06/2019    CR 1.00 02/13/2019    CR 1.18 02/11/2019    CR 0.98 02/07/2019    CR 0.99 02/03/2019    CR 0.97 01/23/2019    CR 0.97 01/02/2019    CR 1.06 12/26/2018    CR 1.16 12/12/2018    CR 1.05 11/15/2018       Results for orders placed or performed during the hospital encounter of 12/26/18   UA with Microscopic   Result Value Ref Range    Color Urine Light Yellow     Appearance Urine Clear     Glucose Urine Negative NEG^Negative mg/dL    Bilirubin Urine Negative NEG^Negative    Ketones Urine Negative NEG^Negative mg/dL    Specific Gravity Urine 1.008 1.003 - 1.035    Blood Urine Trace (A) NEG^Negative    pH Urine 5.5 5.0 - 7.0 pH    Protein Albumin Urine Negative NEG^Negative mg/dL    Urobilinogen mg/dL Normal 0.0 - 2.0 mg/dL    Nitrite Urine Negative NEG^Negative    Leukocyte Esterase Urine Negative NEG^Negative    Source Catheterized Urine     WBC Urine 0 0 - 5 /HPF    RBC Urine 0 0 - 2 /HPF     Results for orders placed or performed in visit on 03/14/12   LDH: Lactate dehydrogenase, total   Result Value Ref Range     325 - 750 U/L   ]   Imaging:   I personally reviewed all applicable imaging and went over the below findings with patient.    Results for orders placed or performed in visit on 04/29/19   MR Brain w/o & w Contrast    Narrative    MR BRAIN W/O & W CONTRAST 4/29/2019 2:06 PM    Provided History:  Please evaluate for right acoustic neuroma  ICD-10:  "Asymmetrical sensorineural hearing loss    Comparison: None    Technique: Axial diffusion-weighted with ADC map, T2-weighted,  turboFLAIR and T1-weighted images of the brain and axial T1-weighted  and coronal T2-weighted with fat saturation images centered on the  internal auditory canals were obtained without intravenous contrast.  Following intravenous administration of gadolinium, axial turboFLAIR  images of the brain and axial T1-weighted with fat saturation and  coronal T1-weighted images centered on the internal auditory canals  were obtained.    Contrast: 6.5mL Gadavist    Findings: No abnormal signal or enhancement along the course of the  seventh and eighth cranial nerves on either side. Vestibular and  auditory structures exhibit normal signal on T2-weighted images and no  abnormal enhancement.    Images of the whole brain demonstrate no mass lesion, no mass effect,  or midline shift. There are multiple T2 hyperintensities throughout  bilateral subcortical and periventricular white matter which are  nonspecific however likely represent small vessel ischemic disease.  Mild to moderate global cerebral atrophy, appropriate for age. 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 No intracranial hemorrhage on  susceptibility-weighted images. There is no restricted diffusion.  Ventricles are proportionate to the cerebral sulci. No abnormal  enhancement.      Impression    Impression:    1. No evidence of vestibular schwannoma.  2. Mild chronic small vessel ischemic disease and cerebral volume  loss.    I have personally reviewed the examination and initial interpretation  and I agree with the findings.    REDDY ARANA MD     3/1/2019- CT C/A/P with contrast - \"1. Bilateral pulmonary nodules measuring up to 4 mm in the right lower lobe, previously measuring 8 mm. No new or " "enlarging pulmonary nodules. 2. No convincing evidence for metastatic disease in the abdomen, pelvis and bones.\"    1/22/19 - CT C/A/P with contrast compared with prior PET/CT: \"1. New well-circumscribed soft tissue pulmonary nodules of the right lower lobe and left upper lobe. Findings could be infectious, inflammatory, or represent metastatic disease. Continued attention on follow-up recommended. Postoperative changes of right nephrectomy. No evidence for local recurrence in the present study.\"    Scribe Disclosure:  I, Efren Kevin, am serving as a scribe to document services personally performed by Stuart King MD at this visit, based upon the provider's statements to me. All documentation has been reviewed by the aforementioned provider prior to being entered into the official medical record.     CC  Patient Care Team:  Pop Zapien MD as PCP - General (Family Practice)  Crista Richards, APRN CNP as PCP - Cardiology (Nurse Practitioner)  Vincenzo Tovar MD as MD (Family Medicine - Sports Medicine)  Candelaria Raymundo MD as MD (Gastroenterology)  Shavon Bustamante PA-C as Physician Assistant (Physician Assistant)  Kenna La MD as MD (Urology)  Cristiana Correa, RN as Registered Nurse (Urology)  Pop Zapien MD as Referring Physician (Family Practice)  Kiera Figueroa, ATUL as Registered Nurse (Urology)  Stuart King MD as MD (Urology)  Kiera Figueroa, RN as Registered Nurse (Urology)  Geovanna Fregoso PA as Physician Assistant (Urology)  Jaden Toledo MD as MD (Internal Medicine-Hematology & Oncology)  Rose Estrella, RN as Nurse Coordinator (Oncology)  Pop Zapien MD as Assigned PCP  SELF, REFERRED    Copy to patient  QUIN VALDEZ  [address] [place] [phone]          "

## 2019-06-03 ENCOUNTER — ANCILLARY PROCEDURE (OUTPATIENT)
Dept: CT IMAGING | Facility: CLINIC | Age: 84
End: 2019-06-03
Attending: PHYSICIAN ASSISTANT
Payer: MEDICARE

## 2019-06-03 ENCOUNTER — OFFICE VISIT (OUTPATIENT)
Dept: OTOLARYNGOLOGY | Facility: CLINIC | Age: 84
End: 2019-06-03
Payer: MEDICARE

## 2019-06-03 ENCOUNTER — PRE VISIT (OUTPATIENT)
Dept: OTOLARYNGOLOGY | Facility: CLINIC | Age: 84
End: 2019-06-03

## 2019-06-03 VITALS
DIASTOLIC BLOOD PRESSURE: 67 MMHG | HEIGHT: 58 IN | BODY MASS INDEX: 31.49 KG/M2 | SYSTOLIC BLOOD PRESSURE: 160 MMHG | WEIGHT: 150 LBS

## 2019-06-03 DIAGNOSIS — R49.0 DYSPHONIA: ICD-10-CM

## 2019-06-03 DIAGNOSIS — R49.0 HOARSENESS: Primary | ICD-10-CM

## 2019-06-03 DIAGNOSIS — R49.0 MUSCLE TENSION DYSPHONIA: ICD-10-CM

## 2019-06-03 DIAGNOSIS — R09.89 THROAT CLEARING: ICD-10-CM

## 2019-06-03 DIAGNOSIS — J38.7 IRRITABLE LARYNX SYNDROME: ICD-10-CM

## 2019-06-03 DIAGNOSIS — C66.1 UROTHELIAL CARCINOMA OF RIGHT DISTAL URETER (H): ICD-10-CM

## 2019-06-03 DIAGNOSIS — R05.9 COUGH: Primary | ICD-10-CM

## 2019-06-03 PROCEDURE — 25000128 H RX IP 250 OP 636: Performed by: INTERNAL MEDICINE

## 2019-06-03 PROCEDURE — 96523 IRRIG DRUG DELIVERY DEVICE: CPT

## 2019-06-03 RX ORDER — HEPARIN SODIUM (PORCINE) LOCK FLUSH IV SOLN 100 UNIT/ML 100 UNIT/ML
5 SOLUTION INTRAVENOUS
Status: COMPLETED | OUTPATIENT
Start: 2019-06-03 | End: 2019-06-03

## 2019-06-03 RX ADMIN — HEPARIN 5 ML: 100 SYRINGE at 11:41

## 2019-06-03 ASSESSMENT — MIFFLIN-ST. JEOR: SCORE: 1015.15

## 2019-06-03 NOTE — Clinical Note
Medicare Certification - Add your Attestation 1. Review the Certification Documentation below 2. Click 'QuickNote' on your toolbar 3. Add P MEDICARE CERTIFICATION-[facility] as a recipient 4. Add your attestation using [email or web address] and choose Rehab Services Attestation - Physician (at the bottom) 5. Click 'Save as QuickAction' and name the Button '[facility] Rehab Cert.' Click 'Accept.' This is a onetime set up. You will now have a [facility] Rehab Cert button on the right side of the inbasket toolbar so the next time you need to add your attestation just, click the button. You will not have to go through any other steps. 6. Click 'Sign and Route'.

## 2019-06-03 NOTE — PROGRESS NOTES
[facility] VOICE CLINIC  Jun Bonner Jr., M.D., F.A.C.S.  Josseline Arias M.D., M.P.H.  Deysi Casillas, Ph.D., CCC/SLP  Gemini Pearson M.M. (voice), MKAITLYNN., CCC/SLP  Cuong Remy M.M. (voice), M.A., CCC/SLP    [facility]  VOICE/SPEECH/BREATHING EVALUATION AND LARYNGEAL EXAMINATION REPORT    Patient: Dimple Oviedo  Date of Visit: 6/3/2019    Clinician: Deysi Casillas, Ph.D., CCC/SLP  Seen in conjunction with: Dr. Arias  Referring Physician:     CHIEF COMPLAINT:  cough    HISTORY  Dimple Oviedo is a 85 year old presenting today for evaluation of chronic cough.    Please refer to Dr. Arias s dictation for a more complete history and impressions.   Salient details of her history are as follows:    Two year Hx throat-clearing with no obvious precipitating factors other than talking    Throat clear gets worse with talking, especially on the phone    Thinks reflux must be a factor; has been taking anti-reflux medication, but it is not helping    Likes to sing, but currently very impaired    DIAGNOSIS/REASON FOR REFERRAL  Cough/throat-clearing/ Evaluate, perform laryngeal exam, treat as appropriate    Patient Supplied Answers To [facility] Intake General Questionnaire  [facility] Intake - General Form Review 6/3/2019   Are you having any of these symptoms? Frequent throat-clearing   Are you having any of these symptoms? Raspy voice   Do you use caffeine? No   If you do use caffeine, how many oz. do you typically drink in a day? 8   How many oz. of non-caffeinated fluid do you typically drink in a day? 32   How often do you experience heartburn, indigestion, chest pain, stomach acid coming up, and/or tasting acid in your mouth or throat? Rarely   Have reflux medications been recommended to you? Yes   If yes, are you taking them regularly? Yes   Voice: Yes   Cough and/or throat-clearing: Yes   If anyone is helping you complete this form (such as an , clinic staff, caregiver, family member, etc.) please  identify them here. judith     Patient Supplied Answers To [facility] Questionnaire  No flowsheet data found.      Patient Supplied Answers To [facility] Intake Voice Questionnaire  [facility] Intake - Voice Review 6/3/2019   How long have you had this voice problem? 2years   In regards to your voice problem, was there anything unusual about the time the problem was first noticed (such as illness, accident, surgery, etc.)?  If yes, please describe.  You have 200 characters to respond.  We will ask for more detail at your visit. throid   How quickly did the voice problem develop? Gradually   For your voice problem, how do the symptoms vary? Worse in the AM, Unpredictable, On and off   Over time, how has the voice problem changed? Worse   How would you rate your typical vocal demand? Routine: frequent periods of talking on most days; most talking is conversational speech   Effort for speaking 0   Effort for singing 5   Overall vocal quality 5   Is your voice ever normal, even briefly? Yes   For your voice problem, what testing/studies have you done (such as imaging/reflux testing)? brain scan   Did you receive any therapy or treatment for your voice problem?  Please describe briefly. no   Prior to this episode, have you ever had a voice problem before?  If yes, at that time did you have therapy or treatment for the voice problem?  Please provide a brief description of that treatment. no   For your voice problem, is there anything else you'd like to tell us? acid reflux   There isn't much I can do to help myself feel better about this problem. Agree somewhat   How I deal with the voice problem now is under my control. Agree somewhat   I don't have much control over my emotional reactions to this problem. Disagree somewhat   When I am upset about the voice problem, I can find a way to feel better. Agree somewhat   VPCMEAN Incomplete       Patient Supplied Answers To [facility] Intake Cough/Throat-Clearing Questionnaire  [facility] Intake -  Cough/Throat-Clearing Review 6/3/2019   How long have you had this cough/throat-clearing problem? 2 years   Was there anything unusual about the time this cough/throat-clearing problem was first noticed (such as illness, accident, surgery, etc)? If yes, please describe.  You have 200 characters to respond.  We will ask for more detail at your visit. 3 years have hadacid reflux or 25years   What do you think caused this cough/throat-clearing problem? acid relux       CURRENT PATIENT COMPLAINTS    Primary: cough; throat-clearing    Secondary: voice    Denies significant dyspnea, dysphagia, or pain    OTHER PERTINENT HISTORY    Reflux; managed with medications    Please also see Dr. Arias's note    OBJECTIVE FINDINGS  VOICE/ SPEECH/ NON-COMMUNICATIVE LARYNGEAL BEHAVIORS EVALUATION  An evaluation of the voice was accomplished today; salient features are as follows:    Palpation of the laryngeal area shows:    No tenderness of the thyrohyoid area     Reduced thyrohyoid space     Cough/ Throat clear:    Not observed    Breathing pattern:    Phonation is not coordinated with respiration    No overt tension is evident.    Voice quality is characterized by    WNL, fairly resonant quality    No roughness, breathiness, or strain, or transient vocal disturbances    Habitual pitch was not formally tested, but is judged to be WNL and appropriate    Loudness is WNL and appropriate for the setting    Sustained vowels: generally WNL, but pressed when she is trying, or uncomfortable with the task    A singing task shows voice quality is consistent between speech and singing    In a pitch glide task to determine pitch range, she demonstrates normal pitch range and normal function    GLOBAL ASSESSMENT OF DYSPHONIA: 10 /100    CAPE-V Overall Severity:   10/100    LARYNGEAL EXAMINATION    Endoscopic laryngeal examination was performed by:  Josseline Arias MD,   I provided technical support, and provided the protocol of instructions  for the patient.  Type of exam:   Flexible endoscopy with chip-tip technology and stroboscopy  This exam shows:  Laryngeal and Vocal Fold Mucosa    Essentially healthy laryngeal mucosa    moderate presence of thickened secretions on the vocal folds, and stranding throughout the laryngeal area  o The secretions on the vocal folds become a vibratory source    Some subglottic edema    Status of vocal fold mucosa:   o Within normal limits, with no lesions and straight vibratory margins    Neurological and Functional Integrity of the Larynx    Vocal folds are mobile and meet at midline; movement is brisk and symmetric; exam is neurologically normal     Incoordination is evident during a task of 20 quickly repeated vowels    Elongation of the vocal folds for pitch increase is WNL, but there is muscle tension and pharyngeal squeezing for the task    On phonation, glottic closure is essentially WNL, with an inconsistent very small midfold glottic gap    Supraglottic Function and Therapy Probes    Mild four-way constriction of the supraglottic larynx during connected speech  o This increases after she is triggered by a gag    Supraglottic function during singing is improved    Stroboscopy provided the following additional information:    The mucosal wave is within normal limits in periodicity, amplitude, symmetry, and phase  o Mucosal wave shows higher glottic resistance (pressed phonation indicated by longer closed phase) after a gag episode    Dr. Arias and I reviewed this laryngeal exam with MsRobert Preet today, and I provided pertinent explanations, as well as written information:    Endoscopic findings are consistent with audio-perceptual assessment and patient Hx/complaints.    her symptoms are accounted for by the presence of thickened secretions, indicating laryngeal irritation, and mild to moderate muscular hyperfunction and incoordination     Because there appears to be a functional component to her symptoms, she  would most likely benefit from functional speech therapy.    ASSESSMENT / PLAN  IMPRESSIONS:  This evaluation has resulted in the following diagnosis/diagnoses for Ms. Oviedo  R05 (Chronic Cough)  R49.0 (Dysphonia)  R68.89 (Chronic Throat Clearing).      Laryngeal evaluation demonstrated mucosal irritation and muscular hyperfunction/incoordination    Perceptual evaluation demonstrated WNL voice quality that does get pressed at times, and very frequent, though mild, throat clears  RECOMMENDATIONS:     A course of speech therapy is recommended to optimize vocal technique and help reduce chronic cough, throat clear and mucosal irritation.    She demonstrates a Good prognosis for improvement given adherence to therapeutic recommendations. Therapeutic     Positive indicators: commitment to process; diagnosis is known to respond to functional treatment;     Negative indicators: none    TREATMENT PLAN  Speech therapy    DURATION/FREQUENCY OF TREATMENT  Six weekly or bi-weekly one-hour sessions, with three monthly one-hour follow-up sessions    GOALS  Patient goal:    To understand the problem and fix it as much as possible  To have a normal and acceptable voice quality  To reduce her cough and throat-clearing to acceptable levels    Long-term goal(s): In six months, Ms. Oviedo will:  1.  Report a week of typical vocal activities, in which dysphonia and vocal effort do not exceed a level of 2 out of 10, 80% of the time   2.  Report a week with no more than two episodes of coughing or throat-clearing per day, that do not last more than two seconds    Certification period: Caryn 3, 2019 - September 1, 2019    This treatment plan was developed with the patient who agreed with the recommendations.      TOTAL SERVICE TIME: 40 minutes  EVALUATION OF VOICE AND RESONANCE (81157)  NO CHARGE FACILITY FEE (40258)      Deysi Casillas, Ph.D., [facility]-SLP  Speech-Language Pathologist  Director, [facility]  [phone]

## 2019-06-03 NOTE — PATIENT INSTRUCTIONS
Patient needs to be scheduled for a Return Voice SLP appointment with Patient choice or first available SLP (Cuong Remy, Deysi Casillas, or Gemini Pearson)    Number and Frequency of sessions:  2 one-hour follow-up appointments in August, as fit her schedule    NOTE, UNLESS SPECIFICALLY STATED IN SPECIAL INSTRUCTIONS ABOVE  There should be at least 7 days between appointments  Once a patient has begun therapy they should only see that specific SLP (this does not include initial or follow-up evaluation)      To call for appointments:  ENT Call Center [phone]    If you have questions, call or e-mail Dr. KATHARINE Casillas  [phone]  [email or web address]

## 2019-06-03 NOTE — LETTER
6/3/2019      RE: Dimple Oviedo  [address]  [facility] [address]       Dear Dr. Hardwick:    I had the pleasure of meeting Dimple Oviedo in consultation at the [facility] of the [facility] at your request, for evaluation of dysphonia. The note from our visit follows. Speech recognition software may have been used in the documentation below; input is reviewed before signature to the best of my ability. I appreciate the opportunity to participate in the care of this pleasant patient.    Please feel free to contact me with any questions.    Sincerely yours,    Josseline Arias M.D., M.P.H.  , Laryngology  Director, [facility]  Otolaryngology- Head & Neck Surgery  [phone]          =====    History of Present Illness:   Dimple Oviedo is a pleasant 85-year-old female with a history of Graves, bladder cancer who presents with a two year history of dysphonia, chronic throat-clearing and throat concerns. Symptoms include frequent throat-clearing and raspy voice.    Voice  She reports a two year history of voice problems.  She associates this with hyperthyroidism developed in December of 2017.  She was diagnosed with Graves and is followed by Dr. Meza.  She was noted to be euthyroid as of March 2019.    She feels like reflux is contributing to the voice problem.  She has been on omeprazole for many years.  Wean was unsuccessful.    She feels like her voice gets worse with use.  It seems to help somewhat to warm up.    She lives in a co-op and her co-residents will say they cannot hear her.  Some of them are hard of hearing, but her daughter-in-law notes this too.  Her voice gets choked up and gravelly.      Swallowing  No concerns.       Cough/Throat-clearing    She has also had throat clearing for two years.   She finds that it is worse with talking.      Breathing  No concerns.       Throat  "discomfort  No concerns.       Reflux-type symptoms  She experiences heartburn/indigestion rarely. She is taking reflux medications.  She does okay if she does not lay down.      Prior EPIC records were reviewed for this visit. She has a family history of esophageal/throat cancer (her father).      MEDICATIONS:     Current Outpatient Medications   Medication Sig Dispense Refill     acetaminophen (TYLENOL) 650 MG CR tablet Take 1 tablet (650 mg) by mouth every 8 hours as needed for pain 100 tablet 0     alendronate (FOSAMAX) 70 MG tablet TAKE 1 TABLET EVERY 7 DAYS AT LEAST 60 MIN BEFORE BREAKFAST AS DIRECTED \"SEE PACKAGE FOR ADDITIONAL INSTRUCTIONS\" 12 tablet 3     aspirin 81 MG tablet Take 81 mg by mouth every evening        Calcium Citrate-Vitamin D (CALCIUM + D PO) Take 1 tablet by mouth 2 times daily        cycloSPORINE (RESTASIS) 0.05 % ophthalmic emulsion Place 1 drop into both eyes 2 times daily       ferrous sulfate 325 (65 Fe) MG TBEC EC tablet Take 325 mg by mouth daily       furosemide (LASIX) 20 MG tablet TAKE 1 TABLET (20 MG) BY MOUTH EVERY MORNING 90 tablet 1     irbesartan (AVAPRO) 300 MG tablet TAKE 1 TABLET EVERY DAY 90 tablet 3     lidocaine-prilocaine (EMLA) 2.5-2.5 % external cream Apply topically as needed for moderate pain 30 g 0     lovastatin (MEVACOR) 40 MG tablet TAKE 1 TABLET AT BEDTIME (HYPERLIPIDEMIA LDL GOAL BELOW 130) 90 tablet 2     methimazole (TAPAZOLE) 5 MG tablet 10 mg alternating with 15 mg every other day 225 tablet 3     nitroGLYcerin (NITROSTAT) 0.4 MG sublingual tablet For chest pain place 1 tablet under the tongue every 5 minutes for 3 doses. If symptoms persist 5 minutes after 1st dose call 911. 25 tablet 3     Omega-3 Fatty Acids (FISH OIL) 500 MG CAPS Take 1 capsule by mouth 2 times daily        omeprazole (PRILOSEC) 20 MG DR capsule TAKE 1 CAPSULE EVERY DAY 90 capsule 2     ondansetron (ZOFRAN) 8 MG tablet Take 1 tablet (8 mg) by mouth every 8 hours as needed " (Nausea/Vomiting) 30 tablet 5     order for DME Equipment being ordered: Knee high compression for B LE 20-30mmHg, Velcro units for night time (consider hybrid sock as foot swelling is less than leg swelling), Donning device    Size: Medium 2 each 3     polyethylene glycol (MIRALAX/GLYCOLAX) packet Take 17 g by mouth       Probiotic Product (PROBIOTIC ADVANCED PO) Take 1 capsule by mouth every morning        prochlorperazine (COMPAZINE) 10 MG tablet Take 0.5 tablets (5 mg) by mouth every 6 hours as needed (Nausea/Vomiting - take if Zofran doesn't work after 30 mins.) 30 tablet 5     propranolol ER (INDERAL LA) 60 MG 24 hr capsule TAKE 1 CAPSULE EVERY DAY 90 capsule 0     rOPINIRole (REQUIP) 0.25 MG tablet 0.25 mg in the afternoon and 0.5 mg at night 270 tablet 1     senna-docusate (SENOKOT-S;PERICOLACE) 8.6-50 MG per tablet Take 1 tablet by mouth 2 times daily To prevent constipation 60 tablet 0     sertraline (ZOLOFT) 100 MG tablet TAKE 1 TABLET (100 MG) BY MOUTH EVERY MORNING 90 tablet 1     traZODone (DESYREL) 50 MG tablet TAKE 1/2 TABLET(25 MG)  AT BEDTIME 45 tablet 0       ALLERGIES:    Allergies   Allergen Reactions     Codeine        PAST MEDICAL HISTORY:   Past Medical History:   Diagnosis Date     Calculus of kidney      Chemotherapy-induced neutropenia (H) 3/6/2019     Esophageal reflux      GERD (gastroesophageal reflux disease)      Hyperlipidemia LDL goal <130 5/9/2010     Malignant melanoma of skin of trunk, except scrotum (H)      Nonspecific abnormal finding     has living will 2004 -      Nontoxic multinodular goiter     no further eval /tx rec per pt     Osteopenia      Other psoriasis      Personal history of colonic polyps      PMR (polymyalgia rheumatica) (H)      Stress-induced cardiomyopathy      Undiagnosed cardiac murmurs      Unspecified constipation      Unspecified essential hypertension      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        PAST SURGICAL HISTORY:   Past Surgical History:    Procedure Laterality Date     BIOPSY       C NONSPECIFIC PROCEDURE  2005    colonoscopy polyp repeat 2010     COLONOSCOPY  2014     COMBINED CYSTOSCOPY, INSERT STENT URETER(S) Bilateral 9/12/2018    Procedure: COMBINED CYSTOSCOPY, INSERT STENT URETER(S);  Cystoscopy Bilateral ureteral Stent Placement.;  Surgeon: Justin Henry MD;  Location: [facility]     COMBINED CYSTOSCOPY, RETROGRADES, URETEROSCOPY, INSERT STENT Bilateral 4/3/2018    Procedure: COMBINED CYSTOSCOPY, RETROGRADES, URETEROSCOPY, INSERT STENT;;  Surgeon: Stuart King MD;  [facility]: [facility]     COMBINED CYSTOSCOPY, RETROGRADES, URETEROSCOPY, INSERT STENT Bilateral 9/10/2018    Procedure: COMBINED CYSTOSCOPY, RETROGRADES, URETEROSCOPY, INSERT STENT;  Cystoscopy, Bilateral Ureteroscopy, Bladder Biopsies, Retrogram Pyelograms, Ureteral Washings and brushings, cysview;  Surgeon: Stuart King MD;  [facility]     CYSTOSCOPY, BIOPSY BLADDER INSTILL OPTICAL AGENT N/A 4/3/2018    Procedure: CYSTOSCOPY, BIOPSY BLADDER INSTILL OPTICAL AGENT;  Cystoscopy, Blue Light Cystoscopy, Bladder Biopsies, Bilateral Selective ureteral washings for Cytology, Bilateral Retrograde Pyelograms, Bilateral Ureteroscopy;  Surgeon: Stuart King MD;  [facility]     CYSTOSCOPY, BIOPSY BLADDER, COMBINED N/A 2/19/2018    Procedure: COMBINED CYSTOSCOPY, BIOPSY BLADDER;  Cystoscopy, Bladder Biopsy;  Surgeon: Kenna aL MD;  Location: [facility] OR     CYSTOSCOPY, REMOVE STENT(S), COMBINED  11/13/2018    Procedure: Flexible Cystoscopy with Stent Removal;  Surgeon: Stuart King MD;  [facility]: [facility]     DAVINCI LYMPHADENECTOMY N/A 11/13/2018    Procedure: Davinci Lymphadenectomy ;  Surgeon: Stuart King MD;  [facility]: [facility]     DAVINCI NEPHROURETERECTOMY N/A 11/13/2018    Procedure: Right DaVinci Assisted Nephroureterectomy;  Surgeon: Stuart King MD;  [facility]     ENDOSCOPIC ULTRASOUND LOWER  GASTROINTESTIONAL TRACT (GI) N/A 10/30/2015    Procedure: ENDOSCOPIC ULTRASOUND LOWER GASTROINTESTIONAL TRACT (GI);  Surgeon: Daniel Jean-Baptiste MD;  Location: [facility] OR     EYE SURGERY  12/4/17     INSERT PORT VASCULAR ACCESS Right 12/19/2018    Procedure: Chest Port Placement - right;  Surgeon: Stuart Chavez PA-C;  [facility]     IR CHEST PORT PLACEMENT > 5 YRS OF AGE  12/19/2018     LAPAROSCOPIC CHOLECYSTECTOMY WITH CHOLANGIOGRAMS N/A 11/1/2015    Procedure: LAPAROSCOPIC CHOLECYSTECTOMY WITH CHOLANGIOGRAMS;  Surgeon: Tonie Warren MD;  Location: [facility]     SURGICAL HISTORY OF -   1996    malignant melanoma     SURGICAL HISTORY OF -   1968    thyroid nodule     SURGICAL HISTORY OF -       D & C       HABITS/SOCIAL HISTORY:    Social History     Tobacco Use     Smoking status: Never Smoker     Smokeless tobacco: Never Used   Substance Use Topics     Alcohol use: No     Alcohol/week: 0.0 oz         FAMILY HISTORY:    Family History   Problem Relation Age of Onset     Cancer Father         dec - esophageal and laryngeal     Heart Disease Mother      Respiratory Mother         dec     Breast Cancer Daughter      Other Cancer Daughter      Thyroid Disease Daughter      Asthma Daughter      Hyperlipidemia Son      Diabetes Son       REVIEW OF SYSTEMS:  The patient completed a comprehensive 11 point review of systems (below), which was reviewed. Positives are as noted below; pertinent findings are as noted in the HPI.     Patient Supplied Answers to Review of Systems  [facility] ROS 6/3/2019   Musculoskeletal Back pain   Endocrine Frequent urination       PHYSICAL EXAMINATION:  General: The patient was alert and conversant, and in no acute distress. Patient accompanied by her daughter-in-law.  Eyes: PERRL, conjunctiva and lids normal, sclera nonicteric.  Nose: Anterior rhinoscopy: no gross abnormalities. mild bleeding; mild mucopurulence; septum grossly normal, mild mucoid drainage and/or  crusting.  Oral cavity/oropharynx: No masses or lesions. Dentition in good condition. Floor of mouth and oral tongue soft to palpation. Tongue mobility and palate elevation intact and symmetric.  Ears: Normal auricles, external auditory canals bilaterally. Visualized portions of tympanic membranes normal bilaterally.   Neck: No palpable cervical lymphadenopathy. There was no significant tenderness to palpation of the thyrohyoid space, which was narrow. Diffuse thyroid prominence. Landmarks palpable.  Resp: Breathing comfortably, no stridor or stertor.  Neuro: Symmetric facial function. Other cranial nerves as documented above.  Psych: Normal affect, pleasant and cooperative.  Voice/speech: Mild dysphonia characterized by glottal zavala.  Extremities: No cyanosis, clubbing, or edema of the upper extremities.      Intake scores  Total Score for Last Patient-Answered VHI Questionnaire  No flowsheet data found.  Total Score for Last Patient-Answered EAT Questionnaire  No flowsheet data found.  Total Score for Last Patient-Answered CSI Questionnaire  No flowsheet data found.      PROCEDURE:   Flexible fiberoptic laryngoscopy and laryngovideostroboscopy  Indications: This procedure was warranted to evaluate the patient's laryngeal anatomy and function. Risks, benefits, and alternatives were discussed.  Description: After written informed consent was obtained, a time-out was performed to confirm patient identity, procedure, and procedure site. Topical 3% lidocaine with 0.25% phenylephrine was applied to the nasal cavities. I performed the endoscopy and no complications were apparent. Continuous and stroboscopic light were utilized to assess routine phonation and variable frequency phonation.  Performed by: Josseline Arias MD MPH  SLP: Deysi Casillas, PhD, CCC-SLP   Findings: Normal nasopharynx. Normal base of tongue, valleculae, and epiglottis. Vocal fold mobility: right: normal; left: normal. Medial edges of the vocal  folds: smooth and straight. No focal mucosal lesions were observed on the true vocal folds. Glissade produced appropriate elongation. There was mild to moderate supraglottic recruitment with connected speech. Mucosa of false vocal folds, aryepiglottic folds, piriform sinuses, and posterior glottis unremarkable. Airway was patent.     The addition of stroboscopy allowed evaluation of the mucosal wave.   Amplitude: right: normal; left: normal. Symmetry: intermittent symmetry. Closure pattern: complete. Closure plane: at glottic level. Phase distribution: normal.      IMAGING/RESULTS reviewed, with pertinent report excerpts below:  EXAMINATION: US THYROID, 11/26/2018   Impression:  Enlarged heterogeneous thyroid gland with innumerable nodules which  are solid and cystic. There are coarse calcifications as well as  echogenic punctate foci likely inspissated colloid with possible  microcalcifications. No significant interval change from prior  ultrasound 12/18/2017.      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 She is exhausted from having so many medical appointments lately, but was curious about what might be recommended for improvement.     We discussed that her exam is not strongly suggestive of reflux or thyroid problems as a source of her dysphonia. I recommended speech therapy as initial primary management, with goals including reducing laryngeal irritability, improving laryngeal efficiency and improving respiratory/phonatory coordination. I also encouraged her to resume singing in her [employer] choir once she has the time and energy to do so.     We did discuss that for further evaluation of her esophagus and reflux, a formal assessment by Gastroenterology would be needed, but I would defer this to her other providers. She was glad to hear that we did not see any findings suggestive of laryngeal cancer.    She will return as needed. I appreciate the  opportunity to participate in the care of this pleasant patient.             Josseline Arias MD

## 2019-06-03 NOTE — NURSING NOTE
"Chief Complaint   Patient presents with     Lab Only     labs drawn from port by rn.       Port accessed with 20- gauge 3/4\" gripper needle and JIC labs drawn by rn.  RNCC (Rose Estrella) notified via text page that JIC tubes were drawn).  Port flushed with NS and heparin then de-accessed.  Pt tolerated well.      Katlin Jeffery RN      "

## 2019-06-03 NOTE — PROGRESS NOTES
Dear Dr. Hardwick:    I had the pleasure of meeting Dimple Oviedo in consultation at the [facility] of the [facility] at your request, for evaluation of dysphonia. The note from our visit follows. Speech recognition software may have been used in the documentation below; input is reviewed before signature to the best of my ability. I appreciate the opportunity to participate in the care of this pleasant patient.    Please feel free to contact me with any questions.    Sincerely yours,    Josseline Arias M.D., M.P.H.  , Laryngology  Director, [facility] Voice [facility]  Otolaryngology- Head & Neck Surgery  [phone]          =====    History of Present Illness:   Dimple Oviedo is a pleasant 85-year-old female with a history of Graves, bladder cancer who presents with a two year history of dysphonia, chronic throat-clearing and throat concerns. Symptoms include frequent throat-clearing and raspy voice.    Voice  She reports a two year history of voice problems.  She associates this with hyperthyroidism developed in December of 2017.  She was diagnosed with Graves and is followed by Dr. Meza.  She was noted to be euthyroid as of March 2019.    She feels like reflux is contributing to the voice problem.  She has been on omeprazole for many years.  Wean was unsuccessful.    She feels like her voice gets worse with use.  It seems to help somewhat to warm up.    She lives in a co-op and her co-residents will say they cannot hear her.  Some of them are hard of hearing, but her daughter-in-law notes this too.  Her voice gets choked up and gravelly.      Swallowing  No concerns.       Cough/Throat-clearing    She has also had throat clearing for two years.   She finds that it is worse with talking.      Breathing  No concerns.       Throat discomfort  No concerns.       Reflux-type symptoms  She experiences heartburn/indigestion rarely.  "She is taking reflux medications.  She does okay if she does not lay down.      Prior EPIC records were reviewed for this visit. She has a family history of esophageal/throat cancer (her father).      MEDICATIONS:     Current Outpatient Medications   Medication Sig Dispense Refill     acetaminophen (TYLENOL) 650 MG CR tablet Take 1 tablet (650 mg) by mouth every 8 hours as needed for pain 100 tablet 0     alendronate (FOSAMAX) 70 MG tablet TAKE 1 TABLET EVERY 7 DAYS AT LEAST 60 MIN BEFORE BREAKFAST AS DIRECTED \"SEE PACKAGE FOR ADDITIONAL INSTRUCTIONS\" 12 tablet 3     aspirin 81 MG tablet Take 81 mg by mouth every evening        Calcium Citrate-Vitamin D (CALCIUM + D PO) Take 1 tablet by mouth 2 times daily        cycloSPORINE (RESTASIS) 0.05 % ophthalmic emulsion Place 1 drop into both eyes 2 times daily       ferrous sulfate 325 (65 Fe) MG TBEC EC tablet Take 325 mg by mouth daily       furosemide (LASIX) 20 MG tablet TAKE 1 TABLET (20 MG) BY MOUTH EVERY MORNING 90 tablet 1     irbesartan (AVAPRO) 300 MG tablet TAKE 1 TABLET EVERY DAY 90 tablet 3     lidocaine-prilocaine (EMLA) 2.5-2.5 % external cream Apply topically as needed for moderate pain 30 g 0     lovastatin (MEVACOR) 40 MG tablet TAKE 1 TABLET AT BEDTIME (HYPERLIPIDEMIA LDL GOAL BELOW 130) 90 tablet 2     methimazole (TAPAZOLE) 5 MG tablet 10 mg alternating with 15 mg every other day 225 tablet 3     nitroGLYcerin (NITROSTAT) 0.4 MG sublingual tablet For chest pain place 1 tablet under the tongue every 5 minutes for 3 doses. If symptoms persist 5 minutes after 1st dose call 911. 25 tablet 3     Omega-3 Fatty Acids (FISH OIL) 500 MG CAPS Take 1 capsule by mouth 2 times daily        omeprazole (PRILOSEC) 20 MG DR capsule TAKE 1 CAPSULE EVERY DAY 90 capsule 2     ondansetron (ZOFRAN) 8 MG tablet Take 1 tablet (8 mg) by mouth every 8 hours as needed (Nausea/Vomiting) 30 tablet 5     order for DME Equipment being ordered: Knee high compression for B LE " 20-30mmHg, Velcro units for night time (consider hybrid sock as foot swelling is less than leg swelling), Donning device    Size: Medium 2 each 3     polyethylene glycol (MIRALAX/GLYCOLAX) packet Take 17 g by mouth       Probiotic Product (PROBIOTIC ADVANCED PO) Take 1 capsule by mouth every morning        prochlorperazine (COMPAZINE) 10 MG tablet Take 0.5 tablets (5 mg) by mouth every 6 hours as needed (Nausea/Vomiting - take if Zofran doesn't work after 30 mins.) 30 tablet 5     propranolol ER (INDERAL LA) 60 MG 24 hr capsule TAKE 1 CAPSULE EVERY DAY 90 capsule 0     rOPINIRole (REQUIP) 0.25 MG tablet 0.25 mg in the afternoon and 0.5 mg at night 270 tablet 1     senna-docusate (SENOKOT-S;PERICOLACE) 8.6-50 MG per tablet Take 1 tablet by mouth 2 times daily To prevent constipation 60 tablet 0     sertraline (ZOLOFT) 100 MG tablet TAKE 1 TABLET (100 MG) BY MOUTH EVERY MORNING 90 tablet 1     traZODone (DESYREL) 50 MG tablet TAKE 1/2 TABLET(25 MG)  AT BEDTIME 45 tablet 0       ALLERGIES:    Allergies   Allergen Reactions     Codeine        PAST MEDICAL HISTORY:   Past Medical History:   Diagnosis Date     Calculus of kidney      Chemotherapy-induced neutropenia (H) 3/6/2019     Esophageal reflux      GERD (gastroesophageal reflux disease)      Hyperlipidemia LDL goal <130 5/9/2010     Malignant melanoma of skin of trunk, except scrotum (H)      Nonspecific abnormal finding     has living will 2004 -      Nontoxic multinodular goiter     no further eval /tx rec per pt     Osteopenia      Other psoriasis      Personal history of colonic polyps      PMR (polymyalgia rheumatica) (H)      Stress-induced cardiomyopathy      Undiagnosed cardiac murmurs      Unspecified constipation      Unspecified essential hypertension      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        PAST SURGICAL HISTORY:   Past Surgical History:   Procedure Laterality Date     BIOPSY       C NONSPECIFIC PROCEDURE  2005    colonoscopy polyp repeat 2010      COLONOSCOPY  2014     COMBINED CYSTOSCOPY, INSERT STENT URETER(S) Bilateral 9/12/2018    Procedure: COMBINED CYSTOSCOPY, INSERT STENT URETER(S);  Cystoscopy Bilateral ureteral Stent Placement.;  Surgeon: Justin Henry MD;  [facility]: [facility]     COMBINED CYSTOSCOPY, RETROGRADES, URETEROSCOPY, INSERT STENT Bilateral 4/3/2018    Procedure: COMBINED CYSTOSCOPY, RETROGRADES, URETEROSCOPY, INSERT STENT;;  Surgeon: Stuart King MD;  Location: [facility]     COMBINED CYSTOSCOPY, RETROGRADES, URETEROSCOPY, INSERT STENT Bilateral 9/10/2018    Procedure: COMBINED CYSTOSCOPY, RETROGRADES, URETEROSCOPY, INSERT STENT;  Cystoscopy, Bilateral Ureteroscopy, Bladder Biopsies, Retrogram Pyelograms, Ureteral Washings and brushings, cysview;  Surgeon: Stuart King MD;  [facility]     CYSTOSCOPY, BIOPSY BLADDER INSTILL OPTICAL AGENT N/A 4/3/2018    Procedure: CYSTOSCOPY, BIOPSY BLADDER INSTILL OPTICAL AGENT;  Cystoscopy, Blue Light Cystoscopy, Bladder Biopsies, Bilateral Selective ureteral washings for Cytology, Bilateral Retrograde Pyelograms, Bilateral Ureteroscopy;  Surgeon: Stuart King MD;  [facility]: [facility]     CYSTOSCOPY, BIOPSY BLADDER, COMBINED N/A 2/19/2018    Procedure: COMBINED CYSTOSCOPY, BIOPSY BLADDER;  Cystoscopy, Bladder Biopsy;  Surgeon: Kenna La MD;  Location: [facility] OR     CYSTOSCOPY, REMOVE STENT(S), COMBINED  11/13/2018    Procedure: Flexible Cystoscopy with Stent Removal;  Surgeon: Stuart King MD;  [facility]: [facility]     DAVINCI LYMPHADENECTOMY N/A 11/13/2018    Procedure: Davinci Lymphadenectomy ;  Surgeon: Stuart King MD;  [facility]     DAVINCI NEPHROURETERECTOMY N/A 11/13/2018    Procedure: Right DaVinci Assisted Nephroureterectomy;  Surgeon: Stuart King MD;  [facility]     ENDOSCOPIC ULTRASOUND LOWER GASTROINTESTIONAL TRACT (GI) N/A 10/30/2015    Procedure: ENDOSCOPIC ULTRASOUND LOWER GASTROINTESTIONAL TRACT  (GI);  Surgeon: Daniel Jean-Baptiste MD;  Location: [facility] OR     EYE SURGERY  12/4/17     INSERT PORT VASCULAR ACCESS Right 12/19/2018    Procedure: Chest Port Placement - right;  Surgeon: Stuart Chavez PA-C;  [facility]     IR CHEST PORT PLACEMENT > 5 YRS OF AGE  12/19/2018     LAPAROSCOPIC CHOLECYSTECTOMY WITH CHOLANGIOGRAMS N/A 11/1/2015    Procedure: LAPAROSCOPIC CHOLECYSTECTOMY WITH CHOLANGIOGRAMS;  Surgeon: Tonie Warren MD;  Location: [facility]     SURGICAL HISTORY OF -   1996    malignant melanoma     SURGICAL HISTORY OF -   1968    thyroid nodule     SURGICAL HISTORY OF -       D & C       HABITS/SOCIAL HISTORY:    Social History     Tobacco Use     Smoking status: Never Smoker     Smokeless tobacco: Never Used   Substance Use Topics     Alcohol use: No     Alcohol/week: 0.0 oz         FAMILY HISTORY:    Family History   Problem Relation Age of Onset     Cancer Father         dec - esophageal and laryngeal     Heart Disease Mother      Respiratory Mother         dec     Breast Cancer Daughter      Other Cancer Daughter      Thyroid Disease Daughter      Asthma Daughter      Hyperlipidemia Son      Diabetes Son       REVIEW OF SYSTEMS:  The patient completed a comprehensive 11 point review of systems (below), which was reviewed. Positives are as noted below; pertinent findings are as noted in the HPI.     Patient Supplied Answers to Review of Systems   ENT ROS 6/3/2019   Musculoskeletal Back pain   Endocrine Frequent urination       PHYSICAL EXAMINATION:  General: The patient was alert and conversant, and in no acute distress. Patient accompanied by her daughter-in-law.  Eyes: PERRL, conjunctiva and lids normal, sclera nonicteric.  Nose: Anterior rhinoscopy: no gross abnormalities. mild bleeding; mild mucopurulence; septum grossly normal, mild mucoid drainage and/or crusting.  Oral cavity/oropharynx: No masses or lesions. Dentition in good condition. Floor of mouth and oral tongue  soft to palpation. Tongue mobility and palate elevation intact and symmetric.  Ears: Normal auricles, external auditory canals bilaterally. Visualized portions of tympanic membranes normal bilaterally.   Neck: No palpable cervical lymphadenopathy. There was no significant tenderness to palpation of the thyrohyoid space, which was narrow. Diffuse thyroid prominence. Landmarks palpable.  Resp: Breathing comfortably, no stridor or stertor.  Neuro: Symmetric facial function. Other cranial nerves as documented above.  Psych: Normal affect, pleasant and cooperative.  Voice/speech: Mild dysphonia characterized by glottal zavala.  Extremities: No cyanosis, clubbing, or edema of the upper extremities.      Intake scores  Total Score for Last Patient-Answered VHI Questionnaire  No flowsheet data found.  Total Score for Last Patient-Answered EAT Questionnaire  No flowsheet data found.  Total Score for Last Patient-Answered CSI Questionnaire  No flowsheet data found.      PROCEDURE:   Flexible fiberoptic laryngoscopy and laryngovideostroboscopy  Indications: This procedure was warranted to evaluate the patient's laryngeal anatomy and function. Risks, benefits, and alternatives were discussed.  Description: After written informed consent was obtained, a time-out was performed to confirm patient identity, procedure, and procedure site. Topical 3% lidocaine with 0.25% phenylephrine was applied to the nasal cavities. I performed the endoscopy and no complications were apparent. Continuous and stroboscopic light were utilized to assess routine phonation and variable frequency phonation.  Performed by: Josseline Arias MD MPH  SLP: Deysi Casillas, PhD, CCC-SLP   Findings: Normal nasopharynx. Normal base of tongue, valleculae, and epiglottis. Vocal fold mobility: right: normal; left: normal. Medial edges of the vocal folds: smooth and straight. No focal mucosal lesions were observed on the true vocal folds. Glissade produced  appropriate elongation. There was mild to moderate supraglottic recruitment with connected speech. Mucosa of false vocal folds, aryepiglottic folds, piriform sinuses, and posterior glottis unremarkable. Airway was patent.     The addition of stroboscopy allowed evaluation of the mucosal wave.   Amplitude: right: normal; left: normal. Symmetry: intermittent symmetry. Closure pattern: complete. Closure plane: at glottic level. Phase distribution: normal.      IMAGING/RESULTS reviewed, with pertinent report excerpts below:  EXAMINATION: US THYROID, 11/26/2018   Impression:  Enlarged heterogeneous thyroid gland with innumerable nodules which  are solid and cystic. There are coarse calcifications as well as  echogenic punctate foci likely inspissated colloid with possible  microcalcifications. No significant interval change from prior  ultrasound 12/18/2017.      IMPRESSION AND PLAN:   Dimple Oivedo presents with muscle tension dysphonia and irritable larynx symptoms. She is exhausted from having so many medical appointments lately, but was curious about what might be recommended for improvement.     We discussed that her exam is not strongly suggestive of reflux or thyroid problems as a source of her dysphonia. I recommended speech therapy as initial primary management, with goals including reducing laryngeal irritability, improving laryngeal efficiency and improving respiratory/phonatory coordination. I also encouraged her to resume singing in her [employer] choir once she has the time and energy to do so.     We did discuss that for further evaluation of her esophagus and reflux, a formal assessment by Gastroenterology would be needed, but I would defer this to her other providers. She was glad to hear that we did not see any findings suggestive of laryngeal cancer.    She will return as needed. I appreciate the opportunity to participate in the care of this pleasant patient.

## 2019-06-10 NOTE — PROGRESS NOTES
Medicare Certification  Physician Attestation   I agree with the information in this note.    Josseline Arias MD

## 2019-06-12 ENCOUNTER — ONCOLOGY VISIT (OUTPATIENT)
Dept: ONCOLOGY | Facility: CLINIC | Age: 84
End: 2019-06-12
Attending: INTERNAL MEDICINE
Payer: MEDICARE

## 2019-06-12 VITALS
RESPIRATION RATE: 16 BRPM | BODY MASS INDEX: 31.49 KG/M2 | OXYGEN SATURATION: 96 % | TEMPERATURE: 98.6 F | SYSTOLIC BLOOD PRESSURE: 135 MMHG | DIASTOLIC BLOOD PRESSURE: 73 MMHG | HEART RATE: 61 BPM | HEIGHT: 58 IN | WEIGHT: 150 LBS

## 2019-06-12 DIAGNOSIS — C68.9 UROTHELIAL CARCINOMA (H): Primary | ICD-10-CM

## 2019-06-12 DIAGNOSIS — C66.1 UROTHELIAL CARCINOMA OF RIGHT DISTAL URETER (H): ICD-10-CM

## 2019-06-12 LAB
ALBUMIN SERPL-MCNC: 3.5 G/DL (ref 3.4–5)
ALP SERPL-CCNC: 98 U/L (ref 40–150)
ALT SERPL W P-5'-P-CCNC: 26 U/L (ref 0–50)
ANION GAP SERPL CALCULATED.3IONS-SCNC: 6 MMOL/L (ref 3–14)
AST SERPL W P-5'-P-CCNC: 18 U/L (ref 0–45)
BASOPHILS # BLD AUTO: 0.1 10E9/L (ref 0–0.2)
BASOPHILS NFR BLD AUTO: 0.6 %
BILIRUB SERPL-MCNC: 0.4 MG/DL (ref 0.2–1.3)
BUN SERPL-MCNC: 28 MG/DL (ref 7–30)
CALCIUM SERPL-MCNC: 8.7 MG/DL (ref 8.5–10.1)
CHLORIDE SERPL-SCNC: 98 MMOL/L (ref 94–109)
CO2 SERPL-SCNC: 28 MMOL/L (ref 20–32)
CREAT SERPL-MCNC: 0.99 MG/DL (ref 0.52–1.04)
DIFFERENTIAL METHOD BLD: NORMAL
EOSINOPHIL # BLD AUTO: 0 10E9/L (ref 0–0.7)
EOSINOPHIL NFR BLD AUTO: 0.5 %
ERYTHROCYTE [DISTWIDTH] IN BLOOD BY AUTOMATED COUNT: 13.1 % (ref 10–15)
GFR SERPL CREATININE-BSD FRML MDRD: 52 ML/MIN/{1.73_M2}
GLUCOSE SERPL-MCNC: 89 MG/DL (ref 70–99)
HCT VFR BLD AUTO: 37.3 % (ref 35–47)
HGB BLD-MCNC: 12.4 G/DL (ref 11.7–15.7)
IMM GRANULOCYTES # BLD: 0 10E9/L (ref 0–0.4)
IMM GRANULOCYTES NFR BLD: 0.5 %
LYMPHOCYTES # BLD AUTO: 1.6 10E9/L (ref 0.8–5.3)
LYMPHOCYTES NFR BLD AUTO: 19.9 %
MCH RBC QN AUTO: 32.4 PG (ref 26.5–33)
MCHC RBC AUTO-ENTMCNC: 33.2 G/DL (ref 31.5–36.5)
MCV RBC AUTO: 97 FL (ref 78–100)
MONOCYTES # BLD AUTO: 0.6 10E9/L (ref 0–1.3)
MONOCYTES NFR BLD AUTO: 8 %
NEUTROPHILS # BLD AUTO: 5.6 10E9/L (ref 1.6–8.3)
NEUTROPHILS NFR BLD AUTO: 70.5 %
NRBC # BLD AUTO: 0 10*3/UL
NRBC BLD AUTO-RTO: 0 /100
PLATELET # BLD AUTO: 203 10E9/L (ref 150–450)
POTASSIUM SERPL-SCNC: 4.5 MMOL/L (ref 3.4–5.3)
PROT SERPL-MCNC: 7.1 G/DL (ref 6.8–8.8)
RBC # BLD AUTO: 3.83 10E12/L (ref 3.8–5.2)
SODIUM SERPL-SCNC: 133 MMOL/L (ref 133–144)
WBC # BLD AUTO: 8 10E9/L (ref 4–11)

## 2019-06-12 PROCEDURE — 99214 OFFICE O/P EST MOD 30 MIN: CPT | Mod: ZP | Performed by: INTERNAL MEDICINE

## 2019-06-12 PROCEDURE — 36591 DRAW BLOOD OFF VENOUS DEVICE: CPT

## 2019-06-12 PROCEDURE — G0463 HOSPITAL OUTPT CLINIC VISIT: HCPCS | Mod: ZF

## 2019-06-12 PROCEDURE — 85025 COMPLETE CBC W/AUTO DIFF WBC: CPT | Performed by: INTERNAL MEDICINE

## 2019-06-12 PROCEDURE — 80053 COMPREHEN METABOLIC PANEL: CPT | Performed by: INTERNAL MEDICINE

## 2019-06-12 PROCEDURE — 25000128 H RX IP 250 OP 636: Performed by: INTERNAL MEDICINE

## 2019-06-12 RX ORDER — HEPARIN SODIUM (PORCINE) LOCK FLUSH IV SOLN 100 UNIT/ML 100 UNIT/ML
5 SOLUTION INTRAVENOUS ONCE
Status: COMPLETED | OUTPATIENT
Start: 2019-06-12 | End: 2019-06-12

## 2019-06-12 RX ADMIN — HEPARIN 5 ML: 100 SYRINGE at 14:44

## 2019-06-12 ASSESSMENT — PAIN SCALES - GENERAL: PAINLEVEL: NO PAIN (0)

## 2019-06-12 ASSESSMENT — MIFFLIN-ST. JEOR: SCORE: 1015.15

## 2019-06-12 NOTE — NURSING NOTE
"Oncology Rooming Note    June 12, 2019 1:13 PM   Dimple Oviedo is a 85 year old female who presents for:    Chief Complaint   Patient presents with     Oncology Clinic Visit     Return Renal Ca     Initial Vitals: /74   Pulse 61   Temp 98.6  F (37  C) (Oral)   Resp 16   Ht 1.473 m (4' 10\")   Wt 68 kg (150 lb)   SpO2 96%   BMI 31.35 kg/m   Estimated body mass index is 31.35 kg/m  as calculated from the following:    Height as of this encounter: 1.473 m (4' 10\").    Weight as of this encounter: 68 kg (150 lb). Body surface area is 1.67 meters squared.  No Pain (0) Comment: Data Unavailable   No LMP recorded. Patient is postmenopausal.  Allergies reviewed: Yes  Medications reviewed: Yes    Medications: Medication refills not needed today.  Pharmacy name entered into [employer]:    [id number] [address] [place] [address] AVE AT [facility] & [address] [facility] MAIL DELIVERY - [facility] [address]    Clinical concerns: CT scan and lab results       Virginia Tanner CMA              "

## 2019-06-12 NOTE — LETTER
6/12/2019       RE: Dimple Oviedo  [address]  [facility] [id number]     Dear Colleague,    Thank you for referring your patient, Dimple Oviedo, to the [facility]. Please see a copy of my visit note below.    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    HISTORY OF PRESENT ILLNESS:  Ms. 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 Son, Geoff accompanies her for the visit.    Ms. Oviedo is doing well overall. States she gained a few pounds. She plays cards and tries to stay active. Has no neuropathy. She denies fever, chills, shortness of breath or chest pain. There is no clinical evidence of disease progression.    ONCOLOGIC HISTORY:  1. 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   - Jan 2018 to September 2018- Persistent gross hematuria and underwent multiple procedures.    - 2/19/18 and 4/3/18- cystoscopies with bladder biopsies  which were negative for bladder tumor  - 1/17/18, 3/8/18, 7/26/18, 9/10/18- Multiple urine cytologies have come back positive by FISH for high-grade transitional cell carcinoma  - 9/10/18- Underwent a bilateral cystoureteroscopy with biopsy and brushing that showed  right upper tract cytology suspicious for high-grade urothelial ca. Right ureter brushing negative from that day. Left upper tract negative.  - 9/10/18- CT A/P without contrast showed new small bilateral pleural effusions and interstitial pulmonary edema but no evidence of locoregional or metastatic disease.  - 9/12/18- Presented to ED with oliguria, CRESENCIO, and mild hydronephrosis bilaterally  "on CT scan and underwent bilateral ureteral stent placment  - 11/13/2018- Right robotic nephroureterectomy, excision of sandip-caval mass and LN excision by Stuart King. Pathology showed \"Right nephroureterectomy: Invasive high grade urothelial carcinoma measuring 3.5 cm, located in renal pelvis and invading through renal parenchyma into perinephric fat. Urothelial carcinoma in-situ present. Margins free of tumor. Sandip-caval mass: Metastatic urothelial carcinoma involving one lymph node with at least 1 cm tumor deposit. Pericaval Extranodal Extension present. Five additional benign pericaval lymph nodes. Pathologic stage: pT4N1 (1 of 6 lymph nodes).\"  - 12/11/18- PET/CT whole body demonstrated significant residual FDG avid disease. For example, \"there is an FDG avid ill-defined pericaval mass immediately medial to the surgical clips measuring approximately 2.0 x 1.4 cm, with max SUV measuring up to12.2, see series 4 image 21. Additional FDG avid retrocaval and interaortocaval lymphadenopathy are noted.There is a 1.2 cm nodule in the right hemipelvis posterior lateral tothe bladder with max SUV measuring 8.3, see series 4 image 77. The bladder is incompletely distended.\" No suspicious thoracic or bone uptake.  - 12/12/18- Started adjuvant chemotherapy (carbo+gem) per POUT trial. Cycle 2 - 1/2/19. Cycle 3 - 1/23/19. Cycle 4 - 02/13/19.  - 1/22/19 - CT C/A/P with contrast compared with prior PET/CT: \"1. New well-circumscribed soft tissue pulmonary nodules of the right lower lobe and left upper lobe. Findings could be infectious, inflammatory, or represent metastatic disease. Continued attention on follow-up recommended. Postoperative changes of right nephrectomy. No evidence for local recurrence in the present study.\"  - 3/1/2019- CT C/A/P with contrast - \"1. Bilateral pulmonary nodules measuring up to 4 mm in the right lower lobe, previously measuring 8 mm. No new or enlarging pulmonary nodules. 2. No convincing " "evidence for metastatic disease in the abdomen, pelvis and bones.\"  - 6/3/2019- CT C/A/P with contrast - \"1. Surgical changes of right nephrectomy without evidence of residual or recurrent disease on this noncontrast exam.  Consider contrast enhanced examination on follow-up. 2. No evidence of metastatic disease in the abdomen, or pelvis on noncontrast CT.  Scattered tiny pulmonary nodules in the chest are not significantly changed.  Previously described prominent right lower lobe pulmonary nodule (previously measuring up to 8 mm) is no longer visualized. 3. Stable bilateral pulmonary nodules measuring maximally 4 mm.\"    REVIEW OF SYSTEMS: 14 point ROS negative other than the symptoms noted above in the HPI.    PAST MEDICAL AND SURGICAL HISTORY:   Past Medical History:   Diagnosis Date     Calculus of kidney      Chemotherapy-induced neutropenia (H) 3/6/2019     Esophageal reflux      GERD (gastroesophageal reflux disease)      Hyperlipidemia LDL goal <130 5/9/2010     Malignant melanoma of skin of trunk, except scrotum (H)      Nonspecific abnormal finding     has living will 2004 -      Nontoxic multinodular goiter     no further eval /tx rec per pt     Osteopenia      Other psoriasis      Personal history of colonic polyps      PMR (polymyalgia rheumatica) (H)      Stress-induced cardiomyopathy      Undiagnosed cardiac murmurs      Unspecified constipation      Unspecified essential hypertension      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     Past Surgical History:   Procedure Laterality Date     BIOPSY       C NONSPECIFIC PROCEDURE  2005    colonoscopy polyp repeat 2010     COLONOSCOPY  2014     COMBINED CYSTOSCOPY, INSERT STENT URETER(S) Bilateral 9/12/2018    Procedure: COMBINED CYSTOSCOPY, INSERT STENT URETER(S);  Cystoscopy Bilateral ureteral Stent Placement.;  Surgeon: Justin Henry MD;  Location: [facility]     COMBINED CYSTOSCOPY, RETROGRADES, URETEROSCOPY, INSERT STENT Bilateral 4/3/2018    Procedure: " COMBINED CYSTOSCOPY, RETROGRADES, URETEROSCOPY, INSERT STENT;;  Surgeon: Stuart King MD;  Location: [facility]     COMBINED CYSTOSCOPY, RETROGRADES, URETEROSCOPY, INSERT STENT Bilateral 9/10/2018    Procedure: COMBINED CYSTOSCOPY, RETROGRADES, URETEROSCOPY, INSERT STENT;  Cystoscopy, Bilateral Ureteroscopy, Bladder Biopsies, Retrogram Pyelograms, Ureteral Washings and brushings, cysview;  Surgeon: Stuart King MD;  Location: [facility]     CYSTOSCOPY, BIOPSY BLADDER INSTILL OPTICAL AGENT N/A 4/3/2018    Procedure: CYSTOSCOPY, BIOPSY BLADDER INSTILL OPTICAL AGENT;  Cystoscopy, Blue Light Cystoscopy, Bladder Biopsies, Bilateral Selective ureteral washings for Cytology, Bilateral Retrograde Pyelograms, Bilateral Ureteroscopy;  Surgeon: Stuart King MD;  Location: [facility]     CYSTOSCOPY, BIOPSY BLADDER, COMBINED N/A 2/19/2018    Procedure: COMBINED CYSTOSCOPY, BIOPSY BLADDER;  Cystoscopy, Bladder Biopsy;  Surgeon: Kenna La MD;  Location: [facility]     CYSTOSCOPY, REMOVE STENT(S), COMBINED  11/13/2018    Procedure: Flexible Cystoscopy with Stent Removal;  Surgeon: Stuart King MD;  Location: [facility]     DAVINCI LYMPHADENECTOMY N/A 11/13/2018    Procedure: Davinci Lymphadenectomy ;  Surgeon: Stuart King MD;  [facility]: [facility]     DAVINCI NEPHROURETERECTOMY N/A 11/13/2018    Procedure: Right DaVinci Assisted Nephroureterectomy;  Surgeon: Stuart King MD;  Location: [facility]     ENDOSCOPIC ULTRASOUND LOWER GASTROINTESTIONAL TRACT (GI) N/A 10/30/2015    Procedure: ENDOSCOPIC ULTRASOUND LOWER GASTROINTESTIONAL TRACT (GI);  Surgeon: Daniel Jean-Baptiste MD;  Location: [facility] OR     EYE SURGERY  12/4/17     INSERT PORT VASCULAR ACCESS Right 12/19/2018    Procedure: Chest Port Placement - right;  Surgeon: Stuart Chavez PA-C;  Location: [facility]     IR CHEST PORT PLACEMENT > 5 YRS OF AGE  12/19/2018     LAPAROSCOPIC CHOLECYSTECTOMY WITH CHOLANGIOGRAMS  "N/A 11/1/2015    Procedure: LAPAROSCOPIC CHOLECYSTECTOMY WITH CHOLANGIOGRAMS;  Surgeon: Tonie Warren MD;  Location: [facility]     SURGICAL HISTORY OF -   1996    malignant melanoma     SURGICAL HISTORY OF -   1968    thyroid nodule     SURGICAL HISTORY OF -       D & C     SOCIAL HISTORY:   Social History     Tobacco Use     Smoking status: Never Smoker     Smokeless tobacco: Never Used   Substance Use Topics     Alcohol use: No     Alcohol/week: 0.0 oz     Drug use: No     FAMILY HISTORY:   Family History   Problem Relation Age of Onset     Cancer Father         dec - esophageal and laryngeal     Heart Disease Mother      Respiratory Mother         dec     Breast Cancer Daughter      Other Cancer Daughter      Thyroid Disease Daughter      Asthma Daughter      Hyperlipidemia Son      Diabetes Son      ALLERGIES:   Allergies   Allergen Reactions     Codeine      CURRENT MEDICATIONS:   Current Outpatient Medications:      acetaminophen (TYLENOL) 650 MG CR tablet, Take 1 tablet (650 mg) by mouth every 8 hours as needed for pain, Disp: 100 tablet, Rfl: 0     alendronate (FOSAMAX) 70 MG tablet, TAKE 1 TABLET EVERY 7 DAYS AT LEAST 60 MIN BEFORE BREAKFAST AS DIRECTED \"SEE PACKAGE FOR ADDITIONAL INSTRUCTIONS\", Disp: 12 tablet, Rfl: 3     aspirin 81 MG tablet, Take 81 mg by mouth every evening , Disp: , Rfl:      Calcium Citrate-Vitamin D (CALCIUM + D PO), Take 1 tablet by mouth 2 times daily , Disp: , Rfl:      cycloSPORINE (RESTASIS) 0.05 % ophthalmic emulsion, Place 1 drop into both eyes 2 times daily, Disp: , Rfl:      ferrous sulfate 325 (65 Fe) MG TBEC EC tablet, Take 325 mg by mouth daily, Disp: , Rfl:      furosemide (LASIX) 20 MG tablet, TAKE 1 TABLET (20 MG) BY MOUTH EVERY MORNING, Disp: 90 tablet, Rfl: 1     irbesartan (AVAPRO) 300 MG tablet, TAKE 1 TABLET EVERY DAY, Disp: 90 tablet, Rfl: 3     lidocaine-prilocaine (EMLA) 2.5-2.5 % external cream, Apply topically as needed for moderate pain, Disp: 30 g, " Rfl: 0     lovastatin (MEVACOR) 40 MG tablet, TAKE 1 TABLET AT BEDTIME (HYPERLIPIDEMIA LDL GOAL BELOW 130), Disp: 90 tablet, Rfl: 2     methimazole (TAPAZOLE) 5 MG tablet, 10 mg alternating with 15 mg every other day, Disp: 225 tablet, Rfl: 3     Omega-3 Fatty Acids (FISH OIL) 500 MG CAPS, Take 1 capsule by mouth 2 times daily , Disp: , Rfl:      omeprazole (PRILOSEC) 20 MG DR capsule, TAKE 1 CAPSULE EVERY DAY, Disp: 90 capsule, Rfl: 2     ondansetron (ZOFRAN) 8 MG tablet, Take 1 tablet (8 mg) by mouth every 8 hours as needed (Nausea/Vomiting), Disp: 30 tablet, Rfl: 5     polyethylene glycol (MIRALAX/GLYCOLAX) packet, Take 17 g by mouth, Disp: , Rfl:      Probiotic Product (PROBIOTIC ADVANCED PO), Take 1 capsule by mouth every morning , Disp: , Rfl:      propranolol ER (INDERAL LA) 60 MG 24 hr capsule, TAKE 1 CAPSULE EVERY DAY, Disp: 90 capsule, Rfl: 0     rOPINIRole (REQUIP) 0.25 MG tablet, 0.25 mg in the afternoon and 0.5 mg at night, Disp: 270 tablet, Rfl: 1     senna-docusate (SENOKOT-S;PERICOLACE) 8.6-50 MG per tablet, Take 1 tablet by mouth 2 times daily To prevent constipation, Disp: 60 tablet, Rfl: 0     sertraline (ZOLOFT) 100 MG tablet, TAKE 1 TABLET (100 MG) BY MOUTH EVERY MORNING, Disp: 90 tablet, Rfl: 1     nitroGLYcerin (NITROSTAT) 0.4 MG sublingual tablet, For chest pain place 1 tablet under the tongue every 5 minutes for 3 doses. If symptoms persist 5 minutes after 1st dose call 911. (Patient not taking: Reported on 6/12/2019), Disp: 25 tablet, Rfl: 3     order for DME, Equipment being ordered: Knee high compression for B LE 20-30mmHg, Velcro units for night time (consider hybrid sock as foot swelling is less than leg swelling), Donning device  Size: Medium, Disp: 2 each, Rfl: 3     prochlorperazine (COMPAZINE) 10 MG tablet, Take 0.5 tablets (5 mg) by mouth every 6 hours as needed (Nausea/Vomiting - take if Zofran doesn't work after 30 mins.) (Patient not taking: Reported on 6/12/2019), Disp: 30  "tablet, Rfl: 5    PHYSICAL EXAMINATION:  Vital signs: /73   Pulse 61   Temp 98.6  F (37  C) (Oral)   Resp 16   Ht 1.473 m (4' 10\")   Wt 68 kg (150 lb)   SpO2 96%   BMI 31.35 kg/m     ECOG performance status of 1. Living independently at home.  GENERAL: Well-nourished healthy-appearing sitting in chair, no acute distress.   HEENT: No icterus, no pallor. Moist mucous membranes. Oropharynx is clear.   NECK: Supple, no JVD/LAD.  LUNGS: Clear to ausculation bilaterally, normal work of breathing.   CARDIOVASCULAR: Regular rate and rhythm, no murmurs, gallops or rubs.   ABDOMEN: Soft, nontender and nondistended, no palpable masses.  EXTREMITIES: No cyanosis, no clubbing, b/l LE edema improved.  NEUROLOGIC: No focal deficits, CN 2-12 grossly intact.  LYMPH NODE EXAM: No palpable adenopathy - cervical, axillary or inguinal.     LABORATORY DATA:  Lab Test 06/12/19  1427 03/06/19  1010 03/03/19  0820  02/13/19  0901   WBC 8.0 10.5 11.9*   < > 9.3   RBC 3.83 3.05* 2.57*   < > 3.36*   HGB 12.4 9.2* 7.7*   < > 9.6*   HCT 37.3 28.9* 24.5*   < > 31.7*   MCV 97 95 95   < > 94   MCH 32.4 30.2 30.0   < > 28.6   MCHC 33.2 31.8 31.4*   < > 30.3*   RDW 13.1 22.0* 22.2*   < > 22.3*    48* 26*   < > 138*   NEUTROPHIL 70.5 78.3 79.4   < > 76.0    131*  --   --  136   POTASSIUM 4.5 4.6  --   --  4.3   CHLORIDE 98 100  --   --  102   CO2 28 25  --   --  27   ANIONGAP 6 6  --   --  7   GLC 89 116*  --   --  89   BUN 28 26  --   --  35*   CR 0.99 1.02  --   --  1.00   SHREYA 8.7 8.8  --   --  8.8   PROTTOTAL 7.1 7.2  --   --  7.3   ALBUMIN 3.5 3.3*  --   --  3.3*   BILITOTAL 0.4 0.6  --   --  0.3   ALKPHOS 98 127  --   --  124   AST 18 19  --   --  19   ALT 26 20  --   --  20    < > = values in this interval not displayed.     IMAGING STUDIES:  As above.    ASSESSMENT AND PLAN: Ms. Oviedo is a delightful 85-year-old lady with recently diagnosed localized stage IV ([id number]) high-grade, muscle-invasive transitional cell " carcinoma of right renal pelvis, status post right robotic nephroureterectomy, here for follow-up.     1. Upper tract urothelial cancer:   - She completed 4 cycles of adjuvant carboplatin plus gemcitabine chemotherapy per POUT trial. Has recovered well overall.   - We reviewed the restaging CT scan results with her and son at length. There is no clear evidence of disease on the scan, which is an excellent finding. Lung findings will need to be monitored closely.  - She  is now on active surveillance.  We discussed what this involves, including close monitoring for new or worsening signs or symptoms such as shortness of breath, chest pain, abdominal pain, unexplained weight loss, hematuria etc.  - As part of active surveillance, she will also continue to see me every 3 months for the next year with restaging CT chest abdomen pelvis without contrast due to her renal function.   -  Seen by Dr. King  on 19. Will ask Dr. King if a surveillance cystoscopy is planned.   - Will refer to IR for PORT removal.     2. Renal artery saccular aneursym:  - Management per IR team.   3. Chemo induced anemia and thrombocytopenia:  - Resolved.   4. History of afib and palpitations:  - Pt encouraged to keep cardiology follow-ups.   Return to see me in 3 months.  All of the above was explained to the patient in lay language and several questions were answered. They are in agreement with the plan.  BILLIN.  Jaden Toledo M.D.  . Professor of Medicine  Genitourinary Oncology  Division of Hematology, Oncology & Transplantation  [facility]

## 2019-06-12 NOTE — NURSING NOTE
Chief Complaint   Patient presents with     Oncology Clinic Visit     Return Renal Ca     Blood Draw     Labs drawn via port by RN in lab.      Port accessed with 20g gripper needle by RN, labs collected, line flushed with saline and heparin.  Vitals taken. Pt checked in for appointment(s).    Idalia VALERO RN PHN BSN  BMT/Oncology Lab

## 2019-06-12 NOTE — PROGRESS NOTES
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    HISTORY OF PRESENT ILLNESS:  Ms. 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 Son, Geoff accompanies her for the visit.    Ms. Oviedo is doing well overall. States she gained a few pounds. She plays cards and tries to stay active. Has no neuropathy. She denies fever, chills, shortness of breath or chest pain. There is no clinical evidence of disease progression.    ONCOLOGIC HISTORY:  1. 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   - Jan 2018 to September 2018- Persistent gross hematuria and underwent multiple procedures.    - 2/19/18 and 4/3/18- cystoscopies with bladder biopsies  which were negative for bladder tumor  - 1/17/18, 3/8/18, 7/26/18, 9/10/18- Multiple urine cytologies have come back positive by FISH for high-grade transitional cell carcinoma  - 9/10/18- Underwent a bilateral cystoureteroscopy with biopsy and brushing that showed  right upper tract cytology suspicious for high-grade urothelial ca. Right ureter brushing negative from that day. Left upper tract negative.  - 9/10/18- CT A/P without contrast showed new small bilateral pleural effusions and interstitial pulmonary edema but no evidence of locoregional or metastatic disease.  - 9/12/18- Presented to ED with oliguria, CRESENCIO, and mild hydronephrosis bilaterally on CT scan and underwent bilateral ureteral stent placment  - 11/13/2018- Right robotic nephroureterectomy, excision of sandip-caval mass and LN excision by Stuart King. Pathology showed \"Right nephroureterectomy: Invasive high grade urothelial " "carcinoma measuring 3.5 cm, located in renal pelvis and invading through renal parenchyma into perinephric fat. Urothelial carcinoma in-situ present. Margins free of tumor. Ana-caval mass: Metastatic urothelial carcinoma involving one lymph node with at least 1 cm tumor deposit. Pericaval Extranodal Extension present. Five additional benign pericaval lymph nodes. Pathologic stage: pT4N1 (1 of 6 lymph nodes).\"  - 12/11/18- PET/CT whole body demonstrated significant residual FDG avid disease. For example, \"there is an FDG avid ill-defined pericaval mass immediately medial to the surgical clips measuring approximately 2.0 x 1.4 cm, with max SUV measuring up to12.2, see series 4 image 21. Additional FDG avid retrocaval and interaortocaval lymphadenopathy are noted.There is a 1.2 cm nodule in the right hemipelvis posterior lateral tothe bladder with max SUV measuring 8.3, see series 4 image 77. The bladder is incompletely distended.\" No suspicious thoracic or bone uptake.  - 12/12/18- Started adjuvant chemotherapy (carbo+gem) per POUT trial. Cycle 2 - 1/2/19. Cycle 3 - 1/23/19. Cycle 4 - 02/13/19.  - 1/22/19 - CT C/A/P with contrast compared with prior PET/CT: \"1. New well-circumscribed soft tissue pulmonary nodules of the right lower lobe and left upper lobe. Findings could be infectious, inflammatory, or represent metastatic disease. Continued attention on follow-up recommended. Postoperative changes of right nephrectomy. No evidence for local recurrence in the present study.\"  - 3/1/2019- CT C/A/P with contrast - \"1. Bilateral pulmonary nodules measuring up to 4 mm in the right lower lobe, previously measuring 8 mm. No new or enlarging pulmonary nodules. 2. No convincing evidence for metastatic disease in the abdomen, pelvis and bones.\"  - 6/3/2019- CT C/A/P with contrast - \"1. Surgical changes of right nephrectomy without evidence of residual or recurrent disease on this noncontrast exam.  Consider contrast enhanced " "examination on follow-up. 2. No evidence of metastatic disease in the abdomen, or pelvis on noncontrast CT.  Scattered tiny pulmonary nodules in the chest are not significantly changed.  Previously described prominent right lower lobe pulmonary nodule (previously measuring up to 8 mm) is no longer visualized. 3. Stable bilateral pulmonary nodules measuring maximally 4 mm.\"    REVIEW OF SYSTEMS: 14 point ROS negative other than the symptoms noted above in the HPI.    PAST MEDICAL AND SURGICAL HISTORY:   Past Medical History:   Diagnosis Date     Calculus of kidney      Chemotherapy-induced neutropenia (H) 3/6/2019     Esophageal reflux      GERD (gastroesophageal reflux disease)      Hyperlipidemia LDL goal <130 5/9/2010     Malignant melanoma of skin of trunk, except scrotum (H)      Nonspecific abnormal finding     has living will 2004 -      Nontoxic multinodular goiter     no further eval /tx rec per pt     Osteopenia      Other psoriasis      Personal history of colonic polyps      PMR (polymyalgia rheumatica) (H)      Stress-induced cardiomyopathy      Undiagnosed cardiac murmurs      Unspecified constipation      Unspecified essential hypertension      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     Past Surgical History:   Procedure Laterality Date     BIOPSY       C NONSPECIFIC PROCEDURE  2005    colonoscopy polyp repeat 2010     COLONOSCOPY  2014     COMBINED CYSTOSCOPY, INSERT STENT URETER(S) Bilateral 9/12/2018    Procedure: COMBINED CYSTOSCOPY, INSERT STENT URETER(S);  Cystoscopy Bilateral ureteral Stent Placement.;  Surgeon: Justin Henry MD;  Location: [facility]     COMBINED CYSTOSCOPY, RETROGRADES, URETEROSCOPY, INSERT STENT Bilateral 4/3/2018    Procedure: COMBINED CYSTOSCOPY, RETROGRADES, URETEROSCOPY, INSERT STENT;;  Surgeon: Stuart King MD;  Location: [facility]     COMBINED CYSTOSCOPY, RETROGRADES, URETEROSCOPY, INSERT STENT Bilateral 9/10/2018    Procedure: COMBINED CYSTOSCOPY, RETROGRADES, " URETEROSCOPY, INSERT STENT;  Cystoscopy, Bilateral Ureteroscopy, Bladder Biopsies, Retrogram Pyelograms, Ureteral Washings and brushings, cysview;  Surgeon: Stuart King MD;  Location: [facility]     CYSTOSCOPY, BIOPSY BLADDER INSTILL OPTICAL AGENT N/A 4/3/2018    Procedure: CYSTOSCOPY, BIOPSY BLADDER INSTILL OPTICAL AGENT;  Cystoscopy, Blue Light Cystoscopy, Bladder Biopsies, Bilateral Selective ureteral washings for Cytology, Bilateral Retrograde Pyelograms, Bilateral Ureteroscopy;  Surgeon: Stuart King MD;  Location: [facility]     CYSTOSCOPY, BIOPSY BLADDER, COMBINED N/A 2/19/2018    Procedure: COMBINED CYSTOSCOPY, BIOPSY BLADDER;  Cystoscopy, Bladder Biopsy;  Surgeon: Kenna La MD;  [facility]: [facility]     CYSTOSCOPY, REMOVE STENT(S), COMBINED  11/13/2018    Procedure: Flexible Cystoscopy with Stent Removal;  Surgeon: Stuart King MD;  Location: [facility]     DAVINCI LYMPHADENECTOMY N/A 11/13/2018    Procedure: Davinci Lymphadenectomy ;  Surgeon: Stuart King MD;  Location: [facility]     DAVINCI NEPHROURETERECTOMY N/A 11/13/2018    Procedure: Right DaVinci Assisted Nephroureterectomy;  Surgeon: Stuart King MD;  Location[facility]     ENDOSCOPIC ULTRASOUND LOWER GASTROINTESTIONAL TRACT (GI) N/A 10/30/2015    Procedure: ENDOSCOPIC ULTRASOUND LOWER GASTROINTESTIONAL TRACT (GI);  Surgeon: Daniel Jean-Baptiste MD;  Location: [facility]     EYE SURGERY  12/4/17     INSERT PORT VASCULAR ACCESS Right 12/19/2018    Procedure: Chest Port Placement - right;  Surgeon: Stuart Chavez PA-C;  Location: [facility]     IR CHEST PORT PLACEMENT > 5 YRS OF AGE  12/19/2018     LAPAROSCOPIC CHOLECYSTECTOMY WITH CHOLANGIOGRAMS N/A 11/1/2015    Procedure: LAPAROSCOPIC CHOLECYSTECTOMY WITH CHOLANGIOGRAMS;  Surgeon: Tonie Wraren MD;  Location: [facility]     SURGICAL HISTORY OF -   1996    malignant melanoma     SURGICAL HISTORY OF -   1968    thyroid nodule     SURGICAL  "HISTORY OF -       D & C     SOCIAL HISTORY:   Social History     Tobacco Use     Smoking status: Never Smoker     Smokeless tobacco: Never Used   Substance Use Topics     Alcohol use: No     Alcohol/week: 0.0 oz     Drug use: No     FAMILY HISTORY:   Family History   Problem Relation Age of Onset     Cancer Father         dec - esophageal and laryngeal     Heart Disease Mother      Respiratory Mother         dec     Breast Cancer Daughter      Other Cancer Daughter      Thyroid Disease Daughter      Asthma Daughter      Hyperlipidemia Son      Diabetes Son      ALLERGIES:   Allergies   Allergen Reactions     Codeine      CURRENT MEDICATIONS:   Current Outpatient Medications:      acetaminophen (TYLENOL) 650 MG CR tablet, Take 1 tablet (650 mg) by mouth every 8 hours as needed for pain, Disp: 100 tablet, Rfl: 0     alendronate (FOSAMAX) 70 MG tablet, TAKE 1 TABLET EVERY 7 DAYS AT LEAST 60 MIN BEFORE BREAKFAST AS DIRECTED \"SEE PACKAGE FOR ADDITIONAL INSTRUCTIONS\", Disp: 12 tablet, Rfl: 3     aspirin 81 MG tablet, Take 81 mg by mouth every evening , Disp: , Rfl:      Calcium Citrate-Vitamin D (CALCIUM + D PO), Take 1 tablet by mouth 2 times daily , Disp: , Rfl:      cycloSPORINE (RESTASIS) 0.05 % ophthalmic emulsion, Place 1 drop into both eyes 2 times daily, Disp: , Rfl:      ferrous sulfate 325 (65 Fe) MG TBEC EC tablet, Take 325 mg by mouth daily, Disp: , Rfl:      furosemide (LASIX) 20 MG tablet, TAKE 1 TABLET (20 MG) BY MOUTH EVERY MORNING, Disp: 90 tablet, Rfl: 1     irbesartan (AVAPRO) 300 MG tablet, TAKE 1 TABLET EVERY DAY, Disp: 90 tablet, Rfl: 3     lidocaine-prilocaine (EMLA) 2.5-2.5 % external cream, Apply topically as needed for moderate pain, Disp: 30 g, Rfl: 0     lovastatin (MEVACOR) 40 MG tablet, TAKE 1 TABLET AT BEDTIME (HYPERLIPIDEMIA LDL GOAL BELOW 130), Disp: 90 tablet, Rfl: 2     methimazole (TAPAZOLE) 5 MG tablet, 10 mg alternating with 15 mg every other day, Disp: 225 tablet, Rfl: 3     Omega-3 " "Fatty Acids (FISH OIL) 500 MG CAPS, Take 1 capsule by mouth 2 times daily , Disp: , Rfl:      omeprazole (PRILOSEC) 20 MG DR capsule, TAKE 1 CAPSULE EVERY DAY, Disp: 90 capsule, Rfl: 2     ondansetron (ZOFRAN) 8 MG tablet, Take 1 tablet (8 mg) by mouth every 8 hours as needed (Nausea/Vomiting), Disp: 30 tablet, Rfl: 5     polyethylene glycol (MIRALAX/GLYCOLAX) packet, Take 17 g by mouth, Disp: , Rfl:      Probiotic Product (PROBIOTIC ADVANCED PO), Take 1 capsule by mouth every morning , Disp: , Rfl:      propranolol ER (INDERAL LA) 60 MG 24 hr capsule, TAKE 1 CAPSULE EVERY DAY, Disp: 90 capsule, Rfl: 0     rOPINIRole (REQUIP) 0.25 MG tablet, 0.25 mg in the afternoon and 0.5 mg at night, Disp: 270 tablet, Rfl: 1     senna-docusate (SENOKOT-S;PERICOLACE) 8.6-50 MG per tablet, Take 1 tablet by mouth 2 times daily To prevent constipation, Disp: 60 tablet, Rfl: 0     sertraline (ZOLOFT) 100 MG tablet, TAKE 1 TABLET (100 MG) BY MOUTH EVERY MORNING, Disp: 90 tablet, Rfl: 1     nitroGLYcerin (NITROSTAT) 0.4 MG sublingual tablet, For chest pain place 1 tablet under the tongue every 5 minutes for 3 doses. If symptoms persist 5 minutes after 1st dose call 911. (Patient not taking: Reported on 6/12/2019), Disp: 25 tablet, Rfl: 3     order for DME, Equipment being ordered: Knee high compression for B LE 20-30mmHg, Velcro units for night time (consider hybrid sock as foot swelling is less than leg swelling), Donning device  Size: Medium, Disp: 2 each, Rfl: 3     prochlorperazine (COMPAZINE) 10 MG tablet, Take 0.5 tablets (5 mg) by mouth every 6 hours as needed (Nausea/Vomiting - take if Zofran doesn't work after 30 mins.) (Patient not taking: Reported on 6/12/2019), Disp: 30 tablet, Rfl: 5    PHYSICAL EXAMINATION:  Vital signs: /73   Pulse 61   Temp 98.6  F (37  C) (Oral)   Resp 16   Ht 1.473 m (4' 10\")   Wt 68 kg (150 lb)   SpO2 96%   BMI 31.35 kg/m    ECOG performance status of 1. Living independently at " home.  GENERAL: Well-nourished healthy-appearing sitting in chair, no acute distress.   HEENT: No icterus, no pallor. Moist mucous membranes. Oropharynx is clear.   NECK: Supple, no JVD/LAD.  LUNGS: Clear to ausculation bilaterally, normal work of breathing.   CARDIOVASCULAR: Regular rate and rhythm, no murmurs, gallops or rubs.   ABDOMEN: Soft, nontender and nondistended, no palpable masses.  EXTREMITIES: No cyanosis, no clubbing, b/l LE edema improved.  NEUROLOGIC: No focal deficits, CN 2-12 grossly intact.  LYMPH NODE EXAM: No palpable adenopathy - cervical, axillary or inguinal.     LABORATORY DATA:  Lab Test 06/12/19  1427 03/06/19  1010 03/03/19  0820  02/13/19  0901   WBC 8.0 10.5 11.9*   < > 9.3   RBC 3.83 3.05* 2.57*   < > 3.36*   HGB 12.4 9.2* 7.7*   < > 9.6*   HCT 37.3 28.9* 24.5*   < > 31.7*   MCV 97 95 95   < > 94   MCH 32.4 30.2 30.0   < > 28.6   MCHC 33.2 31.8 31.4*   < > 30.3*   RDW 13.1 22.0* 22.2*   < > 22.3*    48* 26*   < > 138*   NEUTROPHIL 70.5 78.3 79.4   < > 76.0    131*  --   --  136   POTASSIUM 4.5 4.6  --   --  4.3   CHLORIDE 98 100  --   --  102   CO2 28 25  --   --  27   ANIONGAP 6 6  --   --  7   GLC 89 116*  --   --  89   BUN 28 26  --   --  35*   CR 0.99 1.02  --   --  1.00   SHREYA 8.7 8.8  --   --  8.8   PROTTOTAL 7.1 7.2  --   --  7.3   ALBUMIN 3.5 3.3*  --   --  3.3*   BILITOTAL 0.4 0.6  --   --  0.3   ALKPHOS 98 127  --   --  124   AST 18 19  --   --  19   ALT 26 20  --   --  20    < > = values in this interval not displayed.     IMAGING STUDIES:  As above.    ASSESSMENT AND PLAN: Ms. Oviedo is a delightful 85-year-old lady with recently diagnosed localized stage IV ([id number]) high-grade, muscle-invasive transitional cell carcinoma of right renal pelvis, status post right robotic nephroureterectomy, here for follow-up.     1. Upper tract urothelial cancer:   - She completed 4 cycles of adjuvant carboplatin plus gemcitabine chemotherapy per POUT trial. Has recovered well  overall.   - We reviewed the restaging CT scan results with her and son at length. There is no clear evidence of disease on the scan, which is an excellent finding. Lung findings will need to be monitored closely.  - She is now on active surveillance.  We discussed what this involves, including close monitoring for new or worsening signs or symptoms such as shortness of breath, chest pain, abdominal pain, unexplained weight loss, hematuria etc.  - As part of active surveillance, she will also continue to see me every 3 months for the next year with restaging CT chest abdomen pelvis without contrast due to her renal function.   - Seen by Dr. King on 19. Will ask Dr. King if a surveillance cystoscopy is planned.   - Will refer to IR for PORT removal.     2. Renal artery saccular aneursym:  - Management per IR team.   3. Chemo induced anemia and thrombocytopenia:  - Resolved.   4. History of afib and palpitations:  - Pt encouraged to keep cardiology follow-ups.   Return to see me in 3 months.  All of the above was explained to the patient in lay language and several questions were answered. They are in agreement with the plan.  BILLIN.  Jaden Toledo M.D.  . Professor of Medicine  Genitourinary Oncology  Division of Hematology, Oncology & Transplantation  [facility]

## 2019-06-13 ENCOUNTER — TELEPHONE (OUTPATIENT)
Dept: ONCOLOGY | Facility: CLINIC | Age: 84
End: 2019-06-13

## 2019-06-13 NOTE — TELEPHONE ENCOUNTER
----- Message from Jaden Toledo MD sent at 6/12/2019  1:44 PM CDT -----  Regarding: F/up labs tomorrow  Kong Rose  Pt is getting routine labs (CBC, comp metabolic panel) done today. Please follow-up on them tomorrow and give her a call to inform (per patient preference). Most likely will be within normal range, but if there's any issues, please contact me before calling pt.  Thanks,  AR

## 2019-06-13 NOTE — TELEPHONE ENCOUNTER
TC to pt. No answer. LM stating that her lab results were all within normal range and she may call the clinic if she has any questions.

## 2019-06-21 ENCOUNTER — OFFICE VISIT (OUTPATIENT)
Dept: URGENT CARE | Facility: URGENT CARE | Age: 84
End: 2019-06-21
Payer: MEDICARE

## 2019-06-21 VITALS
DIASTOLIC BLOOD PRESSURE: 64 MMHG | RESPIRATION RATE: 12 BRPM | HEIGHT: 58 IN | SYSTOLIC BLOOD PRESSURE: 128 MMHG | TEMPERATURE: 98.5 F | BODY MASS INDEX: 31.49 KG/M2 | HEART RATE: 60 BPM | WEIGHT: 150 LBS

## 2019-06-21 DIAGNOSIS — N30.90 BLADDER INFECTION: Primary | ICD-10-CM

## 2019-06-21 LAB
ALBUMIN UR-MCNC: NEGATIVE MG/DL
APPEARANCE UR: CLEAR
BACTERIA #/AREA URNS HPF: ABNORMAL /HPF
BILIRUB UR QL STRIP: NEGATIVE
COLOR UR AUTO: YELLOW
GLUCOSE UR STRIP-MCNC: NEGATIVE MG/DL
HGB UR QL STRIP: ABNORMAL
KETONES UR STRIP-MCNC: NEGATIVE MG/DL
LEUKOCYTE ESTERASE UR QL STRIP: ABNORMAL
NITRATE UR QL: NEGATIVE
NON-SQ EPI CELLS #/AREA URNS LPF: ABNORMAL /LPF
PH UR STRIP: 5.5 PH (ref 5–7)
RBC #/AREA URNS AUTO: ABNORMAL /HPF
SOURCE: ABNORMAL
SP GR UR STRIP: 1.01 (ref 1–1.03)
UROBILINOGEN UR STRIP-ACNC: 0.2 EU/DL (ref 0.2–1)
WBC #/AREA URNS AUTO: ABNORMAL /HPF
WBC CLUMPS #/AREA URNS HPF: PRESENT /HPF

## 2019-06-21 PROCEDURE — 99213 OFFICE O/P EST LOW 20 MIN: CPT | Performed by: FAMILY MEDICINE

## 2019-06-21 PROCEDURE — 87186 SC STD MICRODIL/AGAR DIL: CPT | Performed by: FAMILY MEDICINE

## 2019-06-21 PROCEDURE — 81001 URINALYSIS AUTO W/SCOPE: CPT | Performed by: INTERNAL MEDICINE

## 2019-06-21 PROCEDURE — 87086 URINE CULTURE/COLONY COUNT: CPT | Performed by: FAMILY MEDICINE

## 2019-06-21 PROCEDURE — 87088 URINE BACTERIA CULTURE: CPT | Performed by: FAMILY MEDICINE

## 2019-06-21 RX ORDER — SULFAMETHOXAZOLE/TRIMETHOPRIM 800-160 MG
1 TABLET ORAL 2 TIMES DAILY
Qty: 8 TABLET | Refills: 0 | Status: SHIPPED | OUTPATIENT
Start: 2019-06-21 | End: 2019-06-25

## 2019-06-21 ASSESSMENT — MIFFLIN-ST. JEOR: SCORE: 1015.15

## 2019-06-21 NOTE — PROGRESS NOTES
Subjective: Patient has had a lot of bladder infections in the past and in the last 2 days has noticed the smell and has mild frequency that are typical symptoms for her, and she is especially worried because on Wednesday she is scheduled for surgery.  She had some chemotherapy in the past but not since February.    Objective: No CVAT.  Abdomen is benign.  Urine is equivocal, culture sent.    Assessment and plan: Based on her history and the upcoming surgery I think we will go ahead and treat this as a full-blown bladder infection with 4 days of Septra, and that way she will be done well over a day before surgery.

## 2019-06-24 LAB
BACTERIA SPEC CULT: ABNORMAL
SPECIMEN SOURCE: ABNORMAL

## 2019-06-25 ENCOUNTER — ANESTHESIA EVENT (OUTPATIENT)
Dept: SURGERY | Facility: AMBULATORY SURGERY CENTER | Age: 84
End: 2019-06-25

## 2019-06-26 ENCOUNTER — ANCILLARY PROCEDURE (OUTPATIENT)
Dept: RADIOLOGY | Facility: AMBULATORY SURGERY CENTER | Age: 84
End: 2019-06-26
Attending: INTERNAL MEDICINE
Payer: MEDICARE

## 2019-06-26 ENCOUNTER — HOSPITAL ENCOUNTER (OUTPATIENT)
Facility: AMBULATORY SURGERY CENTER | Age: 84
End: 2019-06-26
Attending: PHYSICIAN ASSISTANT
Payer: MEDICARE

## 2019-06-26 ENCOUNTER — ANESTHESIA (OUTPATIENT)
Dept: SURGERY | Facility: AMBULATORY SURGERY CENTER | Age: 84
End: 2019-06-26

## 2019-06-26 VITALS
TEMPERATURE: 97 F | DIASTOLIC BLOOD PRESSURE: 62 MMHG | RESPIRATION RATE: 16 BRPM | SYSTOLIC BLOOD PRESSURE: 143 MMHG | HEART RATE: 53 BPM | OXYGEN SATURATION: 95 %

## 2019-06-26 DIAGNOSIS — C66.1 UROTHELIAL CARCINOMA OF RIGHT DISTAL URETER (H): ICD-10-CM

## 2019-06-26 LAB — INR PPP: 0.99 (ref 0.86–1.14)

## 2019-06-26 RX ORDER — SODIUM CHLORIDE, SODIUM LACTATE, POTASSIUM CHLORIDE, CALCIUM CHLORIDE 600; 310; 30; 20 MG/100ML; MG/100ML; MG/100ML; MG/100ML
INJECTION, SOLUTION INTRAVENOUS CONTINUOUS
Status: DISCONTINUED | OUTPATIENT
Start: 2019-06-26 | End: 2019-06-27 | Stop reason: HOSPADM

## 2019-06-26 RX ORDER — OXYCODONE HYDROCHLORIDE 5 MG/1
5 TABLET ORAL EVERY 4 HOURS PRN
Status: DISCONTINUED | OUTPATIENT
Start: 2019-06-26 | End: 2019-06-27 | Stop reason: HOSPADM

## 2019-06-26 RX ORDER — ONDANSETRON 2 MG/ML
4 INJECTION INTRAMUSCULAR; INTRAVENOUS EVERY 30 MIN PRN
Status: DISCONTINUED | OUTPATIENT
Start: 2019-06-26 | End: 2019-06-27 | Stop reason: HOSPADM

## 2019-06-26 RX ORDER — ONDANSETRON 4 MG/1
4 TABLET, ORALLY DISINTEGRATING ORAL EVERY 30 MIN PRN
Status: DISCONTINUED | OUTPATIENT
Start: 2019-06-26 | End: 2019-06-27 | Stop reason: HOSPADM

## 2019-06-26 RX ORDER — ACETAMINOPHEN 325 MG/1
975 TABLET ORAL ONCE
Status: COMPLETED | OUTPATIENT
Start: 2019-06-26 | End: 2019-06-26

## 2019-06-26 RX ORDER — PROPOFOL 10 MG/ML
INJECTION, EMULSION INTRAVENOUS CONTINUOUS PRN
Status: DISCONTINUED | OUTPATIENT
Start: 2019-06-26 | End: 2019-06-26

## 2019-06-26 RX ORDER — LIDOCAINE HYDROCHLORIDE 20 MG/ML
INJECTION, SOLUTION INFILTRATION; PERINEURAL PRN
Status: DISCONTINUED | OUTPATIENT
Start: 2019-06-26 | End: 2019-06-26

## 2019-06-26 RX ORDER — PROPOFOL 10 MG/ML
INJECTION, EMULSION INTRAVENOUS PRN
Status: DISCONTINUED | OUTPATIENT
Start: 2019-06-26 | End: 2019-06-26

## 2019-06-26 RX ORDER — NALOXONE HYDROCHLORIDE 0.4 MG/ML
.1-.4 INJECTION, SOLUTION INTRAMUSCULAR; INTRAVENOUS; SUBCUTANEOUS
Status: DISCONTINUED | OUTPATIENT
Start: 2019-06-26 | End: 2019-06-27 | Stop reason: HOSPADM

## 2019-06-26 RX ORDER — LIDOCAINE 40 MG/G
CREAM TOPICAL
Status: DISCONTINUED | OUTPATIENT
Start: 2019-06-26 | End: 2019-06-27 | Stop reason: HOSPADM

## 2019-06-26 RX ORDER — FENTANYL CITRATE 50 UG/ML
25-50 INJECTION, SOLUTION INTRAMUSCULAR; INTRAVENOUS EVERY 5 MIN PRN
Status: DISCONTINUED | OUTPATIENT
Start: 2019-06-26 | End: 2019-06-27 | Stop reason: HOSPADM

## 2019-06-26 RX ADMIN — PROPOFOL 125 MCG/KG/MIN: 10 INJECTION, EMULSION INTRAVENOUS at 10:33

## 2019-06-26 RX ADMIN — SODIUM CHLORIDE, SODIUM LACTATE, POTASSIUM CHLORIDE, CALCIUM CHLORIDE: 600; 310; 30; 20 INJECTION, SOLUTION INTRAVENOUS at 10:27

## 2019-06-26 RX ADMIN — ACETAMINOPHEN 975 MG: 325 TABLET ORAL at 10:13

## 2019-06-26 RX ADMIN — LIDOCAINE HYDROCHLORIDE 60 MG: 20 INJECTION, SOLUTION INFILTRATION; PERINEURAL at 10:33

## 2019-06-26 RX ADMIN — PROPOFOL 30 MG: 10 INJECTION, EMULSION INTRAVENOUS at 10:45

## 2019-06-26 ASSESSMENT — ENCOUNTER SYMPTOMS: DYSRHYTHMIAS: 1

## 2019-06-26 ASSESSMENT — LIFESTYLE VARIABLES: TOBACCO_USE: 0

## 2019-06-26 NOTE — ANESTHESIA PREPROCEDURE EVALUATION
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   Procedure(s):  Chest Port Placement - right     Past Medical History:   Diagnosis Date     Calculus of kidney      Chemotherapy-induced neutropenia (H) 3/6/2019     Esophageal reflux      GERD (gastroesophageal reflux disease)      Hyperlipidemia LDL goal <130 2010     Malignant melanoma of skin of trunk, except scrotum (H)      Nonspecific abnormal finding     has living will  -      Nontoxic multinodular goiter     no further eval /tx rec per pt     Osteopenia      Other psoriasis      Personal history of colonic polyps      PMR (polymyalgia rheumatica) (H)      Stress-induced cardiomyopathy      Undiagnosed cardiac murmurs      Unspecified constipation      Unspecified essential hypertension      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      Past Surgical History:   Procedure Laterality Date     BIOPSY       C NONSPECIFIC PROCEDURE      colonoscopy polyp repeat      COLONOSCOPY       COMBINED CYSTOSCOPY, INSERT STENT URETER(S) Bilateral 2018    Procedure: COMBINED CYSTOSCOPY, INSERT STENT URETER(S);  Cystoscopy Bilateral ureteral Stent Placement.;  Surgeon: Justin Henry MD;  [facility]     COMBINED CYSTOSCOPY, RETROGRADES, URETEROSCOPY, INSERT STENT Bilateral 4/3/2018    Procedure: COMBINED CYSTOSCOPY, RETROGRADES, URETEROSCOPY, INSERT STENT;;  Surgeon: Stuart King MD;  [facility]     COMBINED CYSTOSCOPY, RETROGRADES, URETEROSCOPY, INSERT STENT Bilateral 9/10/2018    Procedure: COMBINED CYSTOSCOPY, RETROGRADES, URETEROSCOPY, INSERT STENT;  Cystoscopy, Bilateral Ureteroscopy, Bladder Biopsies, Retrogram Pyelograms, Ureteral Washings and brushings, cysview;  Surgeon: Stuart King MD;  [facility]     CYSTOSCOPY, BIOPSY BLADDER  INSTILL OPTICAL AGENT N/A 4/3/2018    Procedure: CYSTOSCOPY, BIOPSY BLADDER INSTILL OPTICAL AGENT;  Cystoscopy, Blue Light Cystoscopy, Bladder Biopsies, Bilateral Selective ureteral washings for Cytology, Bilateral Retrograde Pyelograms, Bilateral Ureteroscopy;  Surgeon: Stuart King MD;  [facility]     CYSTOSCOPY, BIOPSY BLADDER, COMBINED N/A 2/19/2018    Procedure: COMBINED CYSTOSCOPY, BIOPSY BLADDER;  Cystoscopy, Bladder Biopsy;  Surgeon: Kenna La MD;  [facility]     CYSTOSCOPY, REMOVE STENT(S), COMBINED  11/13/2018    Procedure: Flexible Cystoscopy with Stent Removal;  Surgeon: Stuart King MD;  [facility]     DAVINCI LYMPHADENECTOMY N/A 11/13/2018    Procedure: Davinci Lymphadenectomy ;  Surgeon: Stuart King MD;  [facility]     DAVINCI NEPHROURETERECTOMY N/A 11/13/2018    Procedure: Right DaVinci Assisted Nephroureterectomy;  Surgeon: Stuart King MD;  [facility]     ENDOSCOPIC ULTRASOUND LOWER GASTROINTESTIONAL TRACT (GI) N/A 10/30/2015    Procedure: ENDOSCOPIC ULTRASOUND LOWER GASTROINTESTIONAL TRACT (GI);  Surgeon: Daniel Jean-Baptiste MD;  [facility]     EYE SURGERY  12/4/17     INSERT PORT VASCULAR ACCESS Right 12/19/2018    Procedure: Chest Port Placement - right;  Surgeon: Stuart Chavez PA-C;  [facility]     IR CHEST PORT PLACEMENT > 5 YRS OF AGE  12/19/2018     LAPAROSCOPIC CHOLECYSTECTOMY WITH CHOLANGIOGRAMS N/A 11/1/2015    Procedure: LAPAROSCOPIC CHOLECYSTECTOMY WITH CHOLANGIOGRAMS;  Surgeon: Tonie Warren MD;  [facility]     SURGICAL HISTORY OF -   1996    malignant melanoma     SURGICAL HISTORY OF -   1968    thyroid nodule     SURGICAL HISTORY OF -       D & C          Anesthesia Evaluation     . Pt has had prior anesthetic. Type: General    History of anesthetic complications   - PONV        ROS/MED HX    ENT/Pulmonary:     (+)JESS risk factors snores loudly,  hypertension, , . .   (-) tobacco use   Neurologic:  - neg neurologic ROS   (+)other neuro tremors, RLS    Cardiovascular: Comment: CP episode 12/13/17 with elevated troponin. 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 Angiogram no CAD. F/U ECHO Chiatanya EF 65%.   AF with RVR in setting of hyperthyroidism. Spontaneous conversion. Managed with B-blocker, ACEI, statin and ASA.   On Tapaxole.  last endocrine  visit 1/2018. Xarelto DC'd 2/2 bleeding.         (+) hypertension----. : . CHF etiology: Stress induced cardiomyopathy 12/13/17 Last EF: 55-60% date: 9/2018 . . :. dysrhythmias a-fib, valvular problems/murmurs type: AS and AI . Previous cardiac testing Echodate:9/2018results:Interpretation Summary  Left ventricular function, chamber size, wall motion, and wall thickness are  normal.The EF is 55-60%.  Right ventricular function, chamber size, wall motion, and thickness are  normal.  Severe left atrial enlargement is present.  Aortic valve poorly visualized but appears to have moderate calcification and  at least mild-moderate stenosis.  Mild to moderate aortic insufficiency is present.  IVC measures 2.47cm and collapses with inspiration. Estimated mean right  atrial pressure is 8 mmHg (mildly elevated).  No pericardial effusion is present.date: results:ECG reviewed date:9/27/18 results:SR, LADCat date: 12/2017 results:          METS/Exercise Tolerance: Comment: METS<4 1 - Eating, dressing   Hematologic:     (+) Anemia, -     (-) History of Transfusion   Musculoskeletal: Comment: Osteopenia    Osteoarthritis in knees bilaterally.  - neg musculoskeletal ROS       GI/Hepatic:     (+) GERD Asymptomatic on medication, cholecystitis/cholelithiasis (s/p cholecystectomy),       Renal/Genitourinary:     (+) chronic renal disease, type: CRI, Nephrolithiasis , Pt does not require dialysis, Pt has no history of transplant, Other Renal/ Genitourinary, Hematuria      Endo:     (+) thyroid problem (Graves disease)  hyperthyroidism,  Obesity (BMI 31), .      Psychiatric:     (+) psychiatric history depression      Infectious Disease:  - neg infectious disease ROS       Malignancy:   (+) Malignancy History of Other  Skin CA status post Surgery, Other CA Urothelial cancer Active status post Surgery         Other:    (+) C-spine cleared: N/A, no H/O Chronic Pain,other significant disability Other (comment)                       PHYSICAL EXAM:   Mental Status/Neuro: A/A/O   Airway: Facies: Feasible  Mallampati: I  Mouth/Opening: Full  TM distance: > 6 cm  Neck ROM: Full   Respiratory: Auscultation: CTAB     Resp. Rate: Normal     Resp. Effort: Normal      CV: Rhythm: Regular  Rate: Age appropriate  Heart: Normal Sounds   Comments:      Dental: Normal                  Lab Results   Component Value Date    WBC 8.0 06/12/2019    HGB 12.4 06/12/2019    HCT 37.3 06/12/2019     06/12/2019    CRP <2.9 10/06/2017    SED 25 10/06/2017     06/12/2019    POTASSIUM 4.5 06/12/2019    CHLORIDE 98 06/12/2019    CO2 28 06/12/2019    BUN 28 06/12/2019    CR 0.99 06/12/2019    GLC 89 06/12/2019    SHREYA 8.7 06/12/2019    PHOS 2.4 (L) 12/13/2017    MAG 1.8 02/03/2019    ALBUMIN 3.5 06/12/2019    PROTTOTAL 7.1 06/12/2019    ALT 26 06/12/2019    AST 18 06/12/2019    ALKPHOS 98 06/12/2019    BILITOTAL 0.4 06/12/2019    LIPASE 91 12/26/2018    AMYLASE 44 10/29/2015    PTT 27 02/07/2019    INR 0.99 06/26/2019    TSH 0.01 (L) 03/03/2019    T4 1.16 03/03/2019    T3 109 03/03/2019    HCGS Negative 12/13/2017       Preop Vitals  BP Readings from Last 3 Encounters:   06/26/19 165/61   06/21/19 128/64   06/12/19 135/73    Pulse Readings from Last 3 Encounters:   06/26/19 53   06/21/19 60   06/12/19 61      Resp Readings from Last 3 Encounters:   06/26/19 14   06/21/19 12   06/12/19 16    SpO2 Readings from Last 3 Encounters:   06/26/19 96%   06/12/19 96%   05/28/19 100%      Temp Readings from Last 1 Encounters:   06/26/19 36.6  C (97.9  F) (Oral)    Ht Readings from  "Last 1 Encounters:   06/21/19 1.473 m (4' 10\")      Wt Readings from Last 1 Encounters:   06/21/19 68 kg (150 lb)    Estimated body mass index is 31.35 kg/m  as calculated from the following:    Height as of 6/21/19: 1.473 m (4' 10\").    Weight as of 6/21/19: 68 kg (150 lb).     LDA:  Peripheral IV 06/26/19 Left Hand (Active)   Site Assessment WDL 6/26/2019 10:10 AM   Line Status Infusing 6/26/2019 10:10 AM   Phlebitis Scale 0-->no symptoms 6/26/2019 10:10 AM   Infiltration Scale 0 6/26/2019 10:10 AM   Infiltration Site Treatment Method  None 6/26/2019 10:10 AM   Extravasation? No 6/26/2019 10:10 AM   Number of days: 0       Port A Cath Single 12/19/18 Right Chest wall (Active)   Number of days: 189       Urethral Catheter Double-lumen;Latex;Straight-tip;Silver coated 16 fr (Active)   Number of days: 225            Assessment:   ASA SCORE: 3    NPO Status: > 2 hours since completed Clear Liquids; > 6 hours since completed Solid Foods   Documentation: H&P complete   Proceeding: Proceed without further delay  Tobacco Use:  NO Active use of Tobacco/UNKNOWN Tobacco use status     Plan:   Anes. Type:  MAC      Induction:  IV (Standard)   Airway: Native Airway   Access/Monitoring: PIV   Maintenance: Propofol; IV   Emergence: Procedure Site   Logistics: Same Day Surgery     Postop Pain/Sedation Strategy:  Standard-Options: Opioids PRN     PONV Management:  Adult Risk Factors: Female, Non-Smoker, Postop Opioids  Prevention: Propofol Infusion; Ondansetron     CONSENT: Direct conversation   Plan and risks discussed with: Patient                                Rinku Neri MD, MD  "

## 2019-06-26 NOTE — ANESTHESIA POSTPROCEDURE EVALUATION
Anesthesia POST Procedure Evaluation    Patient: Dimple Oviedo   MRN:     [id number] Gender:   female   Age:    86 year old :      1933        Preoperative Diagnosis: Urothelial Carcinoma of Right Distal Ureter   Procedure(s):  Right Port Removal   Postop Comments: No value filed.       Anesthesia Type:  MAC  No value filed.    Reportable Event: NO     PAIN: Uncomplicated   Sign Out status: Comfortable, Well controlled pain     PONV: No PONV   Sign Out status:  No Nausea or Vomiting     Neuro/Psych: Uneventful perioperative course   Sign Out Status: Preoperative baseline; Age appropriate mentation     Airway/Resp.: Uneventful perioperative course   Sign Out Status: Non labored breathing, age appropriate RR; Resp. Status within EXPECTED Parameters     CV: Uneventful perioperative course   Sign Out status: Appropriate BP and perfusion indices; Appropriate HR/Rhythm     Disposition:   Sign Out in:  PACU  Disposition:  Phase II; Home  Recovery Course: Uneventful  Follow-Up: Not required           Last Anesthesia Record Vitals:  CRNA VITALS  2019 1033 - 2019 1132      2019             Pulse:  56    SpO2:  99 %          Last PACU Vitals:  Vitals Value Taken Time   /55 2019 11:00 AM   Temp 35.9  C (96.7  F) 2019 11:00 AM   Pulse 53 2019 11:00 AM   Resp 12 2019 11:00 AM   SpO2     Temp src Available 2019 11:01 AM   NIBP 118/54 2019 11:01 AM   Pulse 56 2019 11:03 AM   SpO2 99 % 2019 11:03 AM   Resp     Temp     Ht Rate 56 2019 11:02 AM   Temp 2           Electronically Signed By: Rinku Neri MD, MD, 2019, 11:32 AM

## 2019-06-26 NOTE — ANESTHESIA CARE TRANSFER NOTE
Patient: Dimple Oviedo    Procedure(s):  Right Port Removal    Diagnosis: Urothelial Carcinoma of Right Distal Ureter  Diagnosis Additional Information: No value filed.    Anesthesia Type:   No value filed.     Note:  Airway :Room Air  Patient transferred to:Phase II  Handoff Report: Identifed the Patient, Identified the Reponsible Provider, Reviewed the pertinent medical history, Discussed the surgical course, Reviewed Intra-OP anesthesia mangement and issues during anesthesia, Set expectations for post-procedure period and Allowed opportunity for questions and acknowledgement of understanding      Vitals: (Last set prior to Anesthesia Care Transfer)    CRNA VITALS  6/26/2019 1033 - 6/26/2019 1108      6/26/2019             Pulse:  56    SpO2:  99 %                Electronically Signed By: NELSON Serrano CRNA  June 26, 2019  11:08 AM

## 2019-06-26 NOTE — PROCEDURES
Interventional Radiology Brief Post Procedure Note    Procedure: IR Port Removal Right    Proceduralist: Froilan Irizarry PA-C    Assistant: None    Time Out: Prior to the start of the procedure and with procedural staff participation, I verbally confirmed the patient s identity using two indicators, relevant allergies, that the procedure was appropriate and matched the consent or emergent situation, and that the correct equipment/implants were available. Immediately prior to starting the procedure I conducted the Time Out with the procedural staff and re-confirmed the patient s name, procedure, and site/side. (The Joint Commission universal protocol was followed.)  Yes        Sedation: Monitored Anesthesia Care (MAC) administered and documented by Anesthesia Care Provider    Findings: Completed removal of right chest port in its entirety.    Estimated Blood Loss: Minimal    SPECIMENS: None    Complications: 1. None     Condition: Stable    Plan: Follow-up per primary team.     Comments: See dictated procedure note for full details.    Froilan Irizarry PA-C

## 2019-06-26 NOTE — DISCHARGE INSTRUCTIONS
A collaboration between [facility] and [facility]  Experts in minimally invasive, targeted treatments performed using imaging guidance    Venous Access Device, Port Catheter or Tunneled Central Line Removal    Today you had your existing venous access device removed, either because it was no longer needed or because there was malfunction or infection issues.    One of our Radiology PAs performed this procedure for you today:    ? Froilan Irizarry PA-C    After you go home:  - Drink plenty of fluids.  Generally 6-8 (8 ounce) glasses a day is recommended.  - Resume your regular diet unless otherwise ordered by a medical provider.  - Keep any applied tape/gauze dressings clean and dry.  Change tape/gauze dressings if they get wet or soiled.  - You may shower the following day after procedure, however cover and protect from moisture any tape/gauze dressings.  You may let water hit and run over dried skin glue, but do not scrub.  Pat the area dry after showering.  - Port removal incisions are closed with absorbable suture, meaning they do not need to be removed at a later date, and a topical skin adhesive (skin glue).  This glue will wear off in 7-14 days.  Do not remove before this time.  If 14 days have passed and residual glue is present, you may gently remove it.  - You may remove tape/gauze dressings after 5 days if the site looks closed and in the process of healing.  - Do not apply gels, lotions, or ointments to the glue site for the first 10 days as this may cause the glue to prematurely soften and fail.  - Do not perform strenuous activities or lift greater than 10 pounds for the next three days.  - If there is bleeding or oozing from the procedure site, apply firm pressure to the area for 5-10 minutes.  If the bleeding continues seek medical advice at the numbers below.  - Mild procedure site discomfort can be treated with an ice pack and over-the-counter pain  relievers.              For 24 hours after any sedation used:  - Relax and take it easy.  No strenuous activities.  - Do not drive or operate machines at home or at work.  - No alcohol consumption.  - Do not make any important or legal decisions.    Call our Interventional Radiology (IR) service if:  - If you start bleeding from the procedure site.  If you do start to bleed from the site, lie down and hold some pressure on the site.  Our radiology provider can help you decide if you need to return to the hospital.  - If you have new or worsening pain related to the procedure.  - If you have concerning swelling at the procedure site.  - If you develop persistent nausea or vomiting.  - If you develop hives or a rash or any unexplained itching.  - If you have a fever of greater than 100.5  F and chills in the first 5 days after procedure.  - Any other concerns related to your procedure.      [facility]  Interventional Radiology (IR)  [address] [place] [address]    Contact Number:  [phone]  (IR control desk)  - Monday - Friday 8:00 am - 4:30 pm    After hours for urgent concerns:  [phone]  - After 4:30 pm Monday - Friday, Weekends and Holidays.   - Ask for Interventional Radiology on-call.  Someone is available 24 hours a day.  - [facility] toll free number:  [phone]      [facility]  Home Care Following Anesthesia  For 24 hours after surgery:  1. Get plenty of rest.  A responsible adult must stay with you for at least 24 hours after you leave the surgery center.  2. Do not drive or use heavy equipment.  If you have weakness or tingling, don't drive or use heavy equipment until this feeling goes away.   3. Do not drink alcohol.   4. Avoid strenuous or risky activities.  Ask for help when climbing stairs.  5. You may feel lightheaded.  IF so, sit for a few minutes before standing.  Have someone help you  get up.   6. If you have nausea (feel sick to your stomach): Drink only clear liquids such as apple juice, ginger ale, broth or 7-Up.  Rest may also help.  Be sure to drink enough fluids.  Move to a regular diet as you feel able.   7. You may have a slight fever.  Call the doctor if your fever is over 100 F (37.7 C) (taken under the tongue) or lasts longer than 24 hours.  8. You may have a dry mouth, a sore throat, muscle aches or trouble sleeping. These should go away after 24 hours.  9. Do not make important or legal decisions.               Tips for taking pain medications  To get the best pain relief possible, remember these points:    Take pain medications as directed, before pain becomes severe.    Pain medication can upset your stomach: taking it with food may help.    Constipation is a common side effect of pain medication. Drink plenty of  fluids.    Eat foods high in fiber. Take a stool softener if recommended by your doctor or pharmacist.    Do not drink alcohol, drive or operate machinery while taking pain medications.    Ask about other ways to control pain, such as with heat, ice or relaxation.    Tylenol/Acetaminophen Consumption  To help encourage the safe use of acetaminophen, the makers of TYLENOL  have lowered the maximum daily dose for single-ingredient Extra Strength TYLENOL  (acetaminophen) products sold in the [place] from 8 pills per day (4,000 mg) to 6 pills per day (3,000 mg). The dosing interval has also changed from 2 pills every 4-6 hours to 2 pills every 6 hours.    If you feel your pain relief is insufficient, you may take Tylenol/Acetaminophen in addition to your narcotic pain medication.     Be careful not to exceed 3,000 mg of Tylenol/Acetaminophen in a 24 hour period from all sources.    If you are taking extra strength Tylenol/acetaminophen (500 mg), the maximum dose is 6 tablets in 24 hours.    If you are taking regular strength acetaminophen (325 mg), the maximum dose is 9 tablets  in 24 hours.    Call a doctor for any of the followin. Signs of infection (fever, growing tenderness at the surgery site, a large amount of drainage or bleeding, severe pain, foul-smelling drainage, redness, swelling).  2. It has been over 8 to 10 hours since surgery and you are still not able to urinate (pass water).  3. Headache for over 24 hours.    Your doctor is:     Froilan Irizarry PA-C                  Or dial [phone] and ask for the resident on call for:  Interventional Radiology  For emergency care, call the:  [place] [facility]:  [phone] (TTY for hearing impaired: [phone])

## 2019-07-03 ENCOUNTER — OFFICE VISIT (OUTPATIENT)
Dept: URGENT CARE | Facility: URGENT CARE | Age: 84
End: 2019-07-03
Payer: MEDICARE

## 2019-07-03 VITALS
DIASTOLIC BLOOD PRESSURE: 80 MMHG | WEIGHT: 150 LBS | SYSTOLIC BLOOD PRESSURE: 138 MMHG | RESPIRATION RATE: 12 BRPM | HEART RATE: 78 BPM | HEIGHT: 58 IN | BODY MASS INDEX: 31.49 KG/M2 | TEMPERATURE: 98.5 F

## 2019-07-03 DIAGNOSIS — R30.0 DYSURIA: Primary | ICD-10-CM

## 2019-07-03 DIAGNOSIS — R82.90 NONSPECIFIC FINDING ON EXAMINATION OF URINE: ICD-10-CM

## 2019-07-03 DIAGNOSIS — N30.00 ACUTE CYSTITIS WITHOUT HEMATURIA: ICD-10-CM

## 2019-07-03 PROCEDURE — 87086 URINE CULTURE/COLONY COUNT: CPT | Performed by: INTERNAL MEDICINE

## 2019-07-03 PROCEDURE — 81001 URINALYSIS AUTO W/SCOPE: CPT | Performed by: INTERNAL MEDICINE

## 2019-07-03 PROCEDURE — 99213 OFFICE O/P EST LOW 20 MIN: CPT | Performed by: INTERNAL MEDICINE

## 2019-07-03 PROCEDURE — 87186 SC STD MICRODIL/AGAR DIL: CPT | Performed by: INTERNAL MEDICINE

## 2019-07-03 PROCEDURE — 87088 URINE BACTERIA CULTURE: CPT | Performed by: INTERNAL MEDICINE

## 2019-07-03 RX ORDER — SULFAMETHOXAZOLE/TRIMETHOPRIM 800-160 MG
1 TABLET ORAL 2 TIMES DAILY
Qty: 14 TABLET | Refills: 0 | Status: SHIPPED | OUTPATIENT
Start: 2019-07-03 | End: 2019-07-10

## 2019-07-03 ASSESSMENT — MIFFLIN-ST. JEOR: SCORE: 1010.15

## 2019-07-03 NOTE — PROGRESS NOTES
"[facility] Progress Note        Amy Dias MD, MPH  07/03/2019         History:      Dimple Oviedo is a pleasant 86 year old year old female who presents today with a possible UTI.   Symptoms of dysuria, urinary urgency and hesitancy since this morning.    No Hematuria. No abdominal or flank pain. There is no history of fever, chills, nausea or vomiting.  No vaginal discharge.   LMP: No LMP recorded. Patient is postmenopausal.              Assessment and Plan:        A reflex to Microscopic and Culture ([place] and [facility] (except [place] and [place])  - Urine Microscopic    - Urine Culture Aerobic Bacterial      UTI:  - sulfamethoxazole-trimethoprim (BACTRIM DS/SEPTRA DS) 800-160 MG tablet; Take 1 tablet by mouth 2 times daily for 7 days  Dispense: 14 tablet; Refill: 0    Advised to increase water and fluid intake.   Prevention and treatment of UTI's discussed.Signs and symptoms of pyelonephritis mentioned.  F/u w PCP in 4-5 days, earlier if symptoms worsen.                     Physical Exam:      /80   Pulse 78   Temp 98.5  F (36.9  C) (Oral)   Resp 12   Ht 1.473 m (4' 10\")   Wt 68 kg (150 lb)   BMI 31.35 kg/m       Constitutional: Patient is in no distress The patient is pleasant and cooperative.   HEENT: Head:  Head is atraumatic, normocephalic.    Eyes: Pupils are equal, round and reactive to light and accomodation.  Sclera is non-icteric. No conjunctival injection, or exudate noted. Extraocular motion is intact. Visual acuity is intact bilaterally.  Ears:  External acoustic canals are patent and clear.  There is no erythema and bulging( exudate)  of the ( R/L ) tympanic membrane(s ).   Nose:  No Nasal congestion w/o drainage or mucosal ulceration is noted.  Throat:  Oral mucosa is moist.  No oral lesions are noted.  No posterior pharyngeal hyperemia nor exudate noted.     Neck Supple.  There is no cervical lymphadenopathy.  No nuchal rigidity noted.  There is no " meningismus.     Cardiovascular: Heart is regular to rate and rhythm.  No murmur is noted.     Lungs: Clear in the anterior and posterior pulmonary fields.   Abdomen: Soft and non-tender.    Back No flank tenderness is noted.   Extremeties No edema, no calf tenderness.   Neuro: No focal deficit.   Skin No petechiae or purpura is noted.  There is no rash.   Mood Normal              Data:      All new lab and imaging data was reviewed.   Results for orders placed or performed in visit on 07/03/19   *UA reflex to Microscopic and Culture ([place] and [facility] (except [place] and [place])   Result Value Ref Range    Color Urine Yellow     Appearance Urine Clear     Glucose Urine Negative NEG^Negative mg/dL    Bilirubin Urine Negative NEG^Negative    Ketones Urine Negative NEG^Negative mg/dL    Specific Gravity Urine 1.010 1.003 - 1.035    Blood Urine Small (A) NEG^Negative    pH Urine 5.5 5.0 - 7.0 pH    Protein Albumin Urine Negative NEG^Negative mg/dL    Urobilinogen Urine 0.2 0.2 - 1.0 EU/dL    Nitrite Urine Negative NEG^Negative    Leukocyte Esterase Urine Moderate (A) NEG^Negative    Source Midstream Urine    Urine Microscopic   Result Value Ref Range    WBC Urine 10-25 (A) OTO5^0 - 5 /HPF    RBC Urine 2-5 (A) OTO2^O - 2 /HPF    WBC Clumps Present (A) NEG^Negative /HPF    Squamous Epithelial /LPF Urine Few FEW^Few /LPF    Bacteria Urine Moderate (A) NEG^Negative /HPF

## 2019-07-05 LAB
BACTERIA SPEC CULT: ABNORMAL
BACTERIA SPEC CULT: ABNORMAL
SPECIMEN SOURCE: ABNORMAL

## 2019-07-08 ENCOUNTER — TELEPHONE (OUTPATIENT)
Dept: ONCOLOGY | Facility: CLINIC | Age: 84
End: 2019-07-08

## 2019-07-08 NOTE — TELEPHONE ENCOUNTER
Pt called in wanting to let care team know that she has been seen and is being treated for a UTI. Please see the 7/3 office visit notes by Dr Dias for details. Pt also provided phone number. I told the caller that the team would probably simply take her note under advisement, but would call with any questions or concerned.

## 2019-07-16 ENCOUNTER — DOCUMENTATION ONLY (OUTPATIENT)
Dept: CARE COORDINATION | Facility: CLINIC | Age: 84
End: 2019-07-16

## 2019-07-26 DIAGNOSIS — E05.00 GRAVES DISEASE: Primary | ICD-10-CM

## 2019-07-29 ASSESSMENT — ENCOUNTER SYMPTOMS
DIZZINESS: 0
POOR WOUND HEALING: 0
STIFFNESS: 0
MEMORY LOSS: 0
MYALGIAS: 0
LOSS OF CONSCIOUSNESS: 0
VOMITING: 1
NECK PAIN: 0
ABDOMINAL PAIN: 0
DISTURBANCES IN COORDINATION: 0
JAUNDICE: 0
HOARSE VOICE: 0
SORE THROAT: 0
JOINT SWELLING: 0
PARALYSIS: 0
TREMORS: 1
SKIN CHANGES: 1
BLOOD IN STOOL: 0
TASTE DISTURBANCE: 1
BLOATING: 0
WEAKNESS: 0
SINUS PAIN: 0
SPEECH CHANGE: 0
NAIL CHANGES: 0
HEARTBURN: 0
BOWEL INCONTINENCE: 0
RECTAL PAIN: 0
DYSURIA: 0
SINUS CONGESTION: 0
FLANK PAIN: 0
HEMATURIA: 0
MUSCLE CRAMPS: 0
HEADACHES: 0
NECK MASS: 0
SEIZURES: 0
TINGLING: 0
DIARRHEA: 0
NUMBNESS: 0
SMELL DISTURBANCE: 1
ARTHRALGIAS: 1
DIFFICULTY URINATING: 0
MUSCLE WEAKNESS: 0
CONSTIPATION: 0
TROUBLE SWALLOWING: 0

## 2019-07-30 ENCOUNTER — OFFICE VISIT (OUTPATIENT)
Dept: ENDOCRINOLOGY | Facility: CLINIC | Age: 84
End: 2019-07-30
Payer: MEDICARE

## 2019-07-30 VITALS
WEIGHT: 153.3 LBS | BODY MASS INDEX: 32.04 KG/M2 | SYSTOLIC BLOOD PRESSURE: 164 MMHG | DIASTOLIC BLOOD PRESSURE: 67 MMHG | HEART RATE: 62 BPM

## 2019-07-30 DIAGNOSIS — E05.00 GRAVES DISEASE: ICD-10-CM

## 2019-07-30 DIAGNOSIS — E05.00 GRAVES DISEASE: Primary | ICD-10-CM

## 2019-07-30 LAB
T3 SERPL-MCNC: 73 NG/DL (ref 60–181)
T3FREE SERPL-MCNC: 1.8 PG/ML (ref 2.3–4.2)
TSH SERPL DL<=0.005 MIU/L-ACNC: 10.94 MU/L (ref 0.4–4)

## 2019-07-30 PROCEDURE — 84480 ASSAY TRIIODOTHYRONINE (T3): CPT | Performed by: INTERNAL MEDICINE

## 2019-07-30 PROCEDURE — 84481 FREE ASSAY (FT-3): CPT | Performed by: INTERNAL MEDICINE

## 2019-07-30 ASSESSMENT — PAIN SCALES - GENERAL: PAINLEVEL: NO PAIN (0)

## 2019-07-30 ASSESSMENT — PATIENT HEALTH QUESTIONNAIRE - PHQ9: SUM OF ALL RESPONSES TO PHQ QUESTIONS 1-9: 0

## 2019-07-30 NOTE — LETTER
7/30/2019       RE: Dimple Oviedo  [address]  [place] [address]     Dear Colleague,    Thank you for referring your patient, Dimple Oviedo, to the [facility]CR[facility]OLOG[facility]. Please see a copy of my visit note below.    This 86 year old woman is here for f/u of Graves.  She first developed symptoms of hyperthyroidism in December 2017.  She was most troubled by the tremor that occurred.  She had not noticed change in her skin or hair.  She saw Dr. Rojas in the community and he started her on methimazole.  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  Because she had received an iodine dye load when she had a coronary angiogram, we were unable to do a radioiodine scan and uptake to determine the precise cause of her hyperthyroidism.  She was maintained on methimazole until June 2017 when it was stopped.  A radioiodine uptake and scan was done in July, showed an uptake of ~ 70%, and confirmed she had Graves' disease.  She was restarted on methimazole in July 2018 and we have been adjusting the dose since then.  She has been taking 5 mg every AM and 10 mg every other day in pm.       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  That disease now seems to be in remission.   She is now on active surveillance.    Today she is here with her daughter in law.  She reports she feels well.  Her energy level has improved.  Her appetite is fine.  She has no problems with temperature tolerance.  She occasionally has some tremor but this is much better than it was in the past.  She said no change in her vision.  She is occasionally constipated.  She is noted no change in her hair or skin.    Her blood pressure is checked regularly at her facility and she reports is always much better controlled than today.      Current Outpatient Medications  "on File Prior to Visit:  acetaminophen (TYLENOL) 650 MG CR tablet Take 1 tablet (650 mg) by mouth every 8 hours as needed for pain   alendronate (FOSAMAX) 70 MG tablet TAKE 1 TABLET EVERY 7 DAYS AT LEAST 60 MIN BEFORE BREAKFAST AS DIRECTED \"SEE PACKAGE FOR ADDITIONAL INSTRUCTIONS\"   aspirin 81 MG tablet Take 81 mg by mouth every evening    Calcium Citrate-Vitamin D (CALCIUM + D PO) Take 1 tablet by mouth 2 times daily    cycloSPORINE (RESTASIS) 0.05 % ophthalmic emulsion Place 1 drop into both eyes 2 times daily   ferrous sulfate 325 (65 Fe) MG TBEC EC tablet Take 325 mg by mouth daily   furosemide (LASIX) 20 MG tablet TAKE 1 TABLET (20 MG) BY MOUTH EVERY MORNING   irbesartan (AVAPRO) 300 MG tablet TAKE 1 TABLET EVERY DAY   lidocaine-prilocaine (EMLA) 2.5-2.5 % external cream Apply topically as needed for moderate pain   lovastatin (MEVACOR) 40 MG tablet TAKE 1 TABLET AT BEDTIME (HYPERLIPIDEMIA LDL GOAL BELOW 130)   methimazole (TAPAZOLE) 5 MG tablet 10 mg alternating with 15 mg every other day   nitroGLYcerin (NITROSTAT) 0.4 MG sublingual tablet For chest pain place 1 tablet under the tongue every 5 minutes for 3 doses. If symptoms persist 5 minutes after 1st dose call 911.   Omega-3 Fatty Acids (FISH OIL) 500 MG CAPS Take 1 capsule by mouth 2 times daily    omeprazole (PRILOSEC) 20 MG DR capsule TAKE 1 CAPSULE EVERY DAY   ondansetron (ZOFRAN) 8 MG tablet Take 1 tablet (8 mg) by mouth every 8 hours as needed (Nausea/Vomiting)   order for DME Equipment being ordered: Knee high compression for B LE 20-30mmHg, Velcro units for night time (consider hybrid sock as foot swelling is less than leg swelling), Donning deviceSize: Medium   polyethylene glycol (MIRALAX/GLYCOLAX) packet Take 17 g by mouth   Probiotic Product (PROBIOTIC ADVANCED PO) Take 1 capsule by mouth every morning    prochlorperazine (COMPAZINE) 10 MG tablet Take 0.5 tablets (5 mg) by mouth every 6 hours as needed (Nausea/Vomiting - take if Zofran doesn't " "work after 30 mins.)   propranolol ER (INDERAL LA) 60 MG 24 hr capsule TAKE 1 CAPSULE EVERY DAY   rOPINIRole (REQUIP) 0.25 MG tablet 0.25 mg in the afternoon and 0.5 mg at night   senna-docusate (SENOKOT-S;PERICOLACE) 8.6-50 MG per tablet Take 1 tablet by mouth 2 times daily To prevent constipation   sertraline (ZOLOFT) 100 MG tablet TAKE 1 TABLET (100 MG) BY MOUTH EVERY MORNING   [] sulfamethoxazole-trimethoprim (BACTRIM DS/SEPTRA DS) 800-160 MG tablet Take 1 tablet by mouth 2 times daily for 7 days   [] sulfamethoxazole-trimethoprim (BACTRIM DS/SEPTRA DS) 800-160 MG tablet Take 1 tablet by mouth 2 times daily for 4 days     No current facility-administered medications on file prior to visit.     ROS: 10 point ROS neg other than the symptoms noted above in the HPI.    So Hx - lives independently    Vital signs:   BP (!) 164/67   Pulse 62   Wt 69.5 kg (153 lb 4.8 oz)   BMI 32.04 kg/m     Estimated body mass index is 32.04 kg/m  as calculated from the following:    Height as of 7/3/19: 1.473 m (4' 10\").    Weight as of this encounter: 69.5 kg (153 lb 4.8 oz).    VSS  NAD  Eyes - no periorbital edema, conjunctival injection, scleral icterus  Neck - right lobe about three times normal in size without distinct nodules.  Surface is smooth and soft.  Left lobe is palpable but not enlarged.  Lungs - clear  CV - RRR.  Normal S1, S2 w/o murmur or gallop.  Wearing compression stockings and edema is present to lower shins  Neuro -DTR 2/4 biceps  Skin - normal texture   Mood - not depressed    ENDO THYROID LABS-[facility] Latest Ref Rng & Units 2019 3/3/2019   TSH 0.40 - 4.00 mU/L 10.94 (H) 0.01 (L)   T4 FREE 0.76 - 1.46 ng/dL  1.16   FREE T3 2.3 - 4.2 pg/mL 1.8 (L)    TRIIODOTHYRONINE(T3) 60 - 181 ng/dL 73 109   THYROGLOBULIN ANTIBODY <40 IU/mL     THYR PEROXIDASE ZARA <35 IU/mL     THYROID STIM IMMUNOG <=1.3 TSI index       Assessment and plan:    Clinically, Ms. Oviedo appears to be euthyroid.  She is tolerated " the treatment with methimazole very well.  She has not developed any evidence of Graves' ophthalmopathy.  Her thyroid function tests show that she has now developed biochemical hypothyroidism.  I will recommend we reduce the dose of methimazole to 5 mg each day.  We will plan to repeat the lab tests in 2 to 3 months and I will see her back in clinic in 6 months.  I will ask her to return sooner if she should have recurrent symptoms of hyperthyroidism.  TSI is pending today.  If that is now reduced, we may want to consider weaning the methimazole more quickly.    Dhara Meza MD

## 2019-07-30 NOTE — PROGRESS NOTES
"This 86 year old woman is here for f/u of Graves.  She first developed symptoms of hyperthyroidism in December 2017.  She was most troubled by the tremor that occurred.  She had not noticed change in her skin or hair.  She saw Dr. Rojas in the community and he started her on methimazole.  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  Because she had received an iodine dye load when she had a coronary angiogram, we were unable to do a radioiodine scan and uptake to determine the precise cause of her hyperthyroidism.  She was maintained on methimazole until June 2017 when it was stopped.  A radioiodine uptake and scan was done in July, showed an uptake of ~ 70%, and confirmed she had Graves' disease.  She was restarted on methimazole in July 2018 and we have been adjusting the dose since then.  She has been taking 5 mg every AM and 10 mg every other day in pm.       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  That disease now seems to be in remission.   She is now on active surveillance.    Today she is here with her daughter in law.  She reports she feels well.  Her energy level has improved.  Her appetite is fine.  She has no problems with temperature tolerance.  She occasionally has some tremor but this is much better than it was in the past.  She said no change in her vision.  She is occasionally constipated.  She is noted no change in her hair or skin.    Her blood pressure is checked regularly at her facility and she reports is always much better controlled than today.      Current Outpatient Medications on File Prior to Visit:  acetaminophen (TYLENOL) 650 MG CR tablet Take 1 tablet (650 mg) by mouth every 8 hours as needed for pain   alendronate (FOSAMAX) 70 MG tablet TAKE 1 TABLET EVERY 7 DAYS AT LEAST 60 MIN BEFORE BREAKFAST AS DIRECTED \"SEE PACKAGE FOR ADDITIONAL INSTRUCTIONS\"   aspirin " 81 MG tablet Take 81 mg by mouth every evening    Calcium Citrate-Vitamin D (CALCIUM + D PO) Take 1 tablet by mouth 2 times daily    cycloSPORINE (RESTASIS) 0.05 % ophthalmic emulsion Place 1 drop into both eyes 2 times daily   ferrous sulfate 325 (65 Fe) MG TBEC EC tablet Take 325 mg by mouth daily   furosemide (LASIX) 20 MG tablet TAKE 1 TABLET (20 MG) BY MOUTH EVERY MORNING   irbesartan (AVAPRO) 300 MG tablet TAKE 1 TABLET EVERY DAY   lidocaine-prilocaine (EMLA) 2.5-2.5 % external cream Apply topically as needed for moderate pain   lovastatin (MEVACOR) 40 MG tablet TAKE 1 TABLET AT BEDTIME (HYPERLIPIDEMIA LDL GOAL BELOW 130)   methimazole (TAPAZOLE) 5 MG tablet 10 mg alternating with 15 mg every other day   nitroGLYcerin (NITROSTAT) 0.4 MG sublingual tablet For chest pain place 1 tablet under the tongue every 5 minutes for 3 doses. If symptoms persist 5 minutes after 1st dose call 911.   Omega-3 Fatty Acids (FISH OIL) 500 MG CAPS Take 1 capsule by mouth 2 times daily    omeprazole (PRILOSEC) 20 MG DR capsule TAKE 1 CAPSULE EVERY DAY   ondansetron (ZOFRAN) 8 MG tablet Take 1 tablet (8 mg) by mouth every 8 hours as needed (Nausea/Vomiting)   order for DME Equipment being ordered: Knee high compression for B LE 20-30mmHg, Velcro units for night time (consider hybrid sock as foot swelling is less than leg swelling), Donning deviceSize: Medium   polyethylene glycol (MIRALAX/GLYCOLAX) packet Take 17 g by mouth   Probiotic Product (PROBIOTIC ADVANCED PO) Take 1 capsule by mouth every morning    prochlorperazine (COMPAZINE) 10 MG tablet Take 0.5 tablets (5 mg) by mouth every 6 hours as needed (Nausea/Vomiting - take if Zofran doesn't work after 30 mins.)   propranolol ER (INDERAL LA) 60 MG 24 hr capsule TAKE 1 CAPSULE EVERY DAY   rOPINIRole (REQUIP) 0.25 MG tablet 0.25 mg in the afternoon and 0.5 mg at night   senna-docusate (SENOKOT-S;PERICOLACE) 8.6-50 MG per tablet Take 1 tablet by mouth 2 times daily To prevent  "constipation   sertraline (ZOLOFT) 100 MG tablet TAKE 1 TABLET (100 MG) BY MOUTH EVERY MORNING   [] sulfamethoxazole-trimethoprim (BACTRIM DS/SEPTRA DS) 800-160 MG tablet Take 1 tablet by mouth 2 times daily for 7 days   [] sulfamethoxazole-trimethoprim (BACTRIM DS/SEPTRA DS) 800-160 MG tablet Take 1 tablet by mouth 2 times daily for 4 days     No current facility-administered medications on file prior to visit.     ROS: 10 point ROS neg other than the symptoms noted above in the HPI.    So Hx - lives independently    Vital signs:   BP (!) 164/67   Pulse 62   Wt 69.5 kg (153 lb 4.8 oz)   BMI 32.04 kg/m    Estimated body mass index is 32.04 kg/m  as calculated from the following:    Height as of 7/3/19: 1.473 m (4' 10\").    Weight as of this encounter: 69.5 kg (153 lb 4.8 oz).    VSS  NAD  Eyes - no periorbital edema, conjunctival injection, scleral icterus  Neck - right lobe about three times normal in size without distinct nodules.  Surface is smooth and soft.  Left lobe is palpable but not enlarged.  Lungs - clear  CV - RRR.  Normal S1, S2 w/o murmur or gallop.  Wearing compression stockings and edema is present to lower shins  Neuro -DTR 2/4 biceps  Skin - normal texture   Mood - not depressed    ENDO [facility]ROID LABS-[facility] Latest Ref Rng & Units 2019 3/3/2019   TSH 0.40 - 4.00 mU/L 10.94 (H) 0.01 (L)   T4 FREE 0.76 - 1.46 ng/dL  1.16   FREE T3 2.3 - 4.2 pg/mL 1.8 (L)    TRIIODOTHYRONINE(T3) 60 - 181 ng/dL 73 109   THYROGLOBULIN ANTIBODY <40 IU/mL     THYR PEROXIDASE ZARA <35 IU/mL     THYROID STIM IMMUNOG <=1.3 TSI index       Assessment and plan:    Clinically, Ms. Oviedo appears to be euthyroid.  She is tolerated the treatment with methimazole very well.  She has not developed any evidence of Graves' ophthalmopathy.  Her thyroid function tests show that she has now developed biochemical hypothyroidism.  I will recommend we reduce the dose of methimazole to 5 mg each day.  We will plan to repeat " the lab tests in 2 to 3 months and I will see her back in clinic in 6 months.  I will ask her to return sooner if she should have recurrent symptoms of hyperthyroidism.  TSI is pending today.  If that is now reduced, we may want to consider weaning the methimazole more quickly.    Dhara Meza MD

## 2019-08-07 LAB — TSI SER-ACNC: 2 TSI INDEX

## 2019-08-14 ENCOUNTER — MYC MEDICAL ADVICE (OUTPATIENT)
Dept: FAMILY MEDICINE | Facility: CLINIC | Age: 84
End: 2019-08-14

## 2019-08-19 DIAGNOSIS — F41.1 GAD (GENERALIZED ANXIETY DISORDER): ICD-10-CM

## 2019-08-19 DIAGNOSIS — G47.00 INSOMNIA, UNSPECIFIED TYPE: Primary | ICD-10-CM

## 2019-08-19 DIAGNOSIS — I87.303 STASIS EDEMA OF BOTH LOWER EXTREMITIES: ICD-10-CM

## 2019-08-19 NOTE — TELEPHONE ENCOUNTER
"Requested Prescriptions   Pending Prescriptions Disp Refills     traZODone (DESYREL) 50 MG tablet [Pharmacy Med Name: TRAZODONE HYDROCHLORIDE 50 MG Tablet] 45 tablet 0     Sig: TAKE 1/2 TABLET AT BEDTIME  Not on current med list   Last Office Visit: 5/28/2019   Future Office Visit:            Serotonin Modulators Failed - 8/19/2019 10:41 AM        Failed - Medication is active on med list        Passed - Recent (12 mo) or future (30 days) visit within the authorizing provider's specialty     Patient had office visit in the last 12 months or has a visit in the next 30 days with authorizing provider or within the authorizing provider's specialty.  See \"Patient Info\" tab in inbasket, or \"Choose Columns\" in Meds & Orders section of the refill encounter.            Passed - Patient is age 18 or older        Passed - No active pregnancy on record        Passed - No positive pregnancy test in past 12 months     _________________________________________________________________     sertraline (ZOLOFT) 100 MG tablet [Pharmacy Med Name: SERTRALINE HYDROCHLORIDE 100 MG Tablet] 90 tablet 1     Sig: TAKE 1 TABLET (100 MG) BY MOUTH EVERY MORNING  Last Written Prescription Date:  2/19/2019  Last Fill Quantity: 90 tablet,  # refills: 1   Last Office Visit: 5/28/2019   Future Office Visit:            SSRIs Protocol Passed - 8/19/2019 10:41 AM        Passed - Recent (12 mo) or future (30 days) visit within the authorizing provider's specialty     Patient had office visit in the last 12 months or has a visit in the next 30 days with authorizing provider or within the authorizing provider's specialty.  See \"Patient Info\" tab in inbasket, or \"Choose Columns\" in Meds & Orders section of the refill encounter.            Passed - Medication is active on med list        Passed - Patient is age 18 or older        Passed - No active pregnancy on record        Passed - No positive pregnancy test in last 12 months          "

## 2019-08-19 NOTE — TELEPHONE ENCOUNTER
"Requested Prescriptions   Pending Prescriptions Disp Refills     furosemide (LASIX) 20 MG tablet [Pharmacy Med Name: FUROSEMIDE 20 MG Tablet] 90 tablet 1     Sig: TAKE 1 TABLET (20 MG) BY MOUTH EVERY MORNING  Last Written Prescription Date:  3/18/2019  Last Fill Quantity: 90 tablet,  # refills: 1   Last Office Visit: 5/28/2019   Future Office Visit:            Diuretics (Including Combos) Protocol Failed - 8/19/2019 10:41 AM        Failed - Blood pressure under 140/90 in past 12 months     BP Readings from Last 3 Encounters:   07/30/19 (!) 164/67   07/03/19 138/80   06/26/19 143/62           Passed - Recent (12 mo) or future (30 days) visit within the authorizing provider's specialty     Patient had office visit in the last 12 months or has a visit in the next 30 days with authorizing provider or within the authorizing provider's specialty.  See \"Patient Info\" tab in inbasket, or \"Choose Columns\" in Meds & Orders section of the refill encounter.            Passed - Medication is active on med list        Passed - Patient is age 18 or older        Passed - No active pregancy on record        Passed - Normal serum creatinine on file in past 12 months     Recent Labs   Lab Test 06/12/19  1427   CR 0.99            Passed - Normal serum potassium on file in past 12 months     Recent Labs   Lab Test 06/12/19  1427   POTASSIUM 4.5            Passed - Normal serum sodium on file in past 12 months     Recent Labs   Lab Test 06/12/19  1427               Passed - No positive pregnancy test in past 12 months          "

## 2019-08-20 RX ORDER — SERTRALINE HYDROCHLORIDE 100 MG/1
100 TABLET, FILM COATED ORAL EVERY MORNING
Qty: 90 TABLET | Refills: 2 | Status: SHIPPED | OUTPATIENT
Start: 2019-08-20 | End: 2020-03-23

## 2019-08-21 RX ORDER — FUROSEMIDE 20 MG
20 TABLET ORAL EVERY MORNING
Qty: 90 TABLET | Refills: 0 | Status: SHIPPED | OUTPATIENT
Start: 2019-08-21 | End: 2019-10-04

## 2019-08-21 RX ORDER — TRAZODONE HYDROCHLORIDE 50 MG/1
TABLET, FILM COATED ORAL
Qty: 45 TABLET | Refills: 0 | Status: SHIPPED | OUTPATIENT
Start: 2019-08-21 | End: 2019-08-21

## 2019-08-21 NOTE — TELEPHONE ENCOUNTER
FE Duarte: patient reports she is no longer taking traZODone (DESYREL) 50 MG tablet.     Medication discontinued    Thank You!  Leanna Martin RN  Triage Nurse

## 2019-08-21 NOTE — TELEPHONE ENCOUNTER
Dr. Zapien-Please sign if agree.    Team coordinators-Please contact patient to schedule BP check with MA.    Routing refill request to provider for review/approval because:  BP elevated    Thank you!  SHASHANK MurrellN, RN

## 2019-08-28 NOTE — TELEPHONE ENCOUNTER
RECORDS RECEIVED FROM: Osteoarthritis of both knees, unspecified osteoarthritis type/no mitra/Pop Zapien MD/medicare aarp/ortho con   DATE RECEIVED: 8/28   NOTES STATUS DETAILS   OFFICE NOTE from referring provider N/A    OFFICE NOTE from other specialist Internal/recieved [facility]RITIS[facility], 2017   DISCHARGE SUMMARY from hospital N/A    DISCHARGE REPORT from the ER N/A    OPERATIVE REPORT N/A    MEDICATION LIST Internal    MRI N/A    CT SCAN N/A    XRAYS (IMAGES & REPORTS) Internal 2011

## 2019-09-04 ENCOUNTER — OFFICE VISIT (OUTPATIENT)
Dept: FAMILY MEDICINE | Facility: CLINIC | Age: 84
End: 2019-09-04
Payer: MEDICARE

## 2019-09-04 VITALS
TEMPERATURE: 98.7 F | HEIGHT: 58 IN | HEART RATE: 56 BPM | BODY MASS INDEX: 32.75 KG/M2 | OXYGEN SATURATION: 97 % | WEIGHT: 156 LBS | RESPIRATION RATE: 16 BRPM | SYSTOLIC BLOOD PRESSURE: 132 MMHG | DIASTOLIC BLOOD PRESSURE: 72 MMHG

## 2019-09-04 DIAGNOSIS — M17.11 ARTHRITIS OF RIGHT KNEE: Primary | ICD-10-CM

## 2019-09-04 DIAGNOSIS — Z23 NEED FOR VACCINATION: ICD-10-CM

## 2019-09-04 PROCEDURE — 90715 TDAP VACCINE 7 YRS/> IM: CPT | Performed by: FAMILY MEDICINE

## 2019-09-04 PROCEDURE — 90471 IMMUNIZATION ADMIN: CPT | Performed by: FAMILY MEDICINE

## 2019-09-04 PROCEDURE — 20610 DRAIN/INJ JOINT/BURSA W/O US: CPT | Performed by: FAMILY MEDICINE

## 2019-09-04 RX ORDER — TRIAMCINOLONE ACETONIDE 40 MG/ML
40 INJECTION, SUSPENSION INTRA-ARTICULAR; INTRAMUSCULAR ONCE
Qty: 1 ML | Refills: 0 | OUTPATIENT
Start: 2019-09-04 | End: 2020-01-10

## 2019-09-04 ASSESSMENT — ANXIETY QUESTIONNAIRES
1. FEELING NERVOUS, ANXIOUS, OR ON EDGE: NOT AT ALL
GAD7 TOTAL SCORE: 0
2. NOT BEING ABLE TO STOP OR CONTROL WORRYING: NOT AT ALL
7. FEELING AFRAID AS IF SOMETHING AWFUL MIGHT HAPPEN: NOT AT ALL
6. BECOMING EASILY ANNOYED OR IRRITABLE: NOT AT ALL
5. BEING SO RESTLESS THAT IT IS HARD TO SIT STILL: NOT AT ALL
IF YOU CHECKED OFF ANY PROBLEMS ON THIS QUESTIONNAIRE, HOW DIFFICULT HAVE THESE PROBLEMS MADE IT FOR YOU TO DO YOUR WORK, TAKE CARE OF THINGS AT HOME, OR GET ALONG WITH OTHER PEOPLE: NOT DIFFICULT AT ALL
3. WORRYING TOO MUCH ABOUT DIFFERENT THINGS: NOT AT ALL

## 2019-09-04 ASSESSMENT — PATIENT HEALTH QUESTIONNAIRE - PHQ9
5. POOR APPETITE OR OVEREATING: NOT AT ALL
SUM OF ALL RESPONSES TO PHQ QUESTIONS 1-9: 0

## 2019-09-04 ASSESSMENT — MIFFLIN-ST. JEOR: SCORE: 1037.36

## 2019-09-04 NOTE — PROGRESS NOTES
"Subjective     Dimple Oviedo is a 86 year old female who presents to clinic today for the following health issues:    HPI     Pt would like right knee injection. Has appt with ortho 09/25/2019    Last injection lasted 3 months or so.     /72 (BP Location: Left arm, Patient Position: Sitting, Cuff Size: Adult Regular)   Pulse 56   Temp 98.7  F (37.1  C) (Oral)   Resp 16   Ht 1.473 m (4' 10\")   Wt 70.8 kg (156 lb)   SpO2 97%   BMI 32.60 kg/m    Wearing compression socks. Walking with walker. Not in acute distress.   Knee -both knee medial joint line hypertrophy and tenderness.     Discussed options for treatment of knee OA including oral medication, joint injection, synvisc and surgery. Pt is failing current oral meds and would like injection today. We discussed complications with current procedure including the risks of infection of the joint, bleeding in joint, pain flare up, and not being effective.     RT knee corticosteroid injection  After risks, benefits and complications of steroid injection were discussed with the patient he elected to proceed.  Using sterile technique, the area was first prepped with alcohol swab and chlorprep..  Topical ethyl chloride was used as local.  A 22 gauge, 1 1/2 inch needle was used to inject 40 mg kenalong(1ml) and 4cc of 1% lidocaine each into the knee joint using an anterolateral joint line approach on the RT side.  Pt.  tolerated the procedure well without complications.  Post injection instructions given.        (M17.11) Arthritis of right knee  (primary encounter diagnosis)  Comment: She previously has had bilateral knee injection.  This time she would like to do this on the right side which is the worst knee.  She is already scheduled to see Ortho for follow-up which seems an appropriate option.  She has not had recent x-ray but we agreed to hold off on it until seen by Ortho.  Plan: DRAIN/INJECT LARGE JOINT/BURSA, triamcinolone         (KENALOG-40) 40 MG/ML " injection, TRIAMCINOLONE         ACET INJ NOS             (Z23) Need for vaccination  Comment:     Plan: TDAP, IM (10 - 64 YRS) - Adacel, ADMIN 1st         VACCINE

## 2019-09-04 NOTE — NURSING NOTE
Prior to immunization administration, verified patients identity using patient s name and date of birth. Please see Immunization Activity for additional information.     Screening Questionnaire for Adult Immunization    Are you sick today?   No   Do you have allergies to medications, food, a vaccine component or latex?   No   Have you ever had a serious reaction after receiving a vaccination?   No   Do you have a long-term health problem with heart disease, lung disease, asthma, kidney disease, metabolic disease (e.g. diabetes), anemia, or other blood disorder?   No   Do you have cancer, leukemia, HIV/AIDS, or any other immune system problem?   No   In the past 3 months, have you taken medications that affect  your immune system, such as prednisone, other steroids, or anticancer drugs; drugs for the treatment of rheumatoid arthritis, Crohn s disease, or psoriasis; or have you had radiation treatments?   No   Have you had a seizure, or a brain or other nervous system problem?   No   During the past year, have you received a transfusion of blood or blood     products, or been given immune (gamma) globulin or antiviral drug?   No   For women: Are you pregnant or is there a chance you could become        pregnant during the next month?   No   Have you received any vaccinations in the past 4 weeks?   No     Immunization questionnaire answers were all negative.        Per orders of Dr. Zapien, injection of Tdap given by Kerry Osei CMA. Patient instructed to remain in clinic for 15 minutes afterwards, and to report any adverse reaction to me immediately.       Screening performed by Kerry Osei CMA on 9/4/2019 at 2:34 PM.

## 2019-09-05 ASSESSMENT — ANXIETY QUESTIONNAIRES: GAD7 TOTAL SCORE: 0

## 2019-09-11 ENCOUNTER — APPOINTMENT (OUTPATIENT)
Dept: LAB | Facility: CLINIC | Age: 84
End: 2019-09-11
Attending: INTERNAL MEDICINE
Payer: MEDICARE

## 2019-09-11 ENCOUNTER — ANCILLARY PROCEDURE (OUTPATIENT)
Dept: CT IMAGING | Facility: CLINIC | Age: 84
End: 2019-09-11
Attending: INTERNAL MEDICINE
Payer: MEDICARE

## 2019-09-11 ENCOUNTER — ONCOLOGY VISIT (OUTPATIENT)
Dept: ONCOLOGY | Facility: CLINIC | Age: 84
End: 2019-09-11
Attending: INTERNAL MEDICINE
Payer: MEDICARE

## 2019-09-11 VITALS
TEMPERATURE: 98.7 F | HEART RATE: 55 BPM | WEIGHT: 155 LBS | BODY MASS INDEX: 32.54 KG/M2 | SYSTOLIC BLOOD PRESSURE: 157 MMHG | OXYGEN SATURATION: 98 % | HEIGHT: 58 IN | RESPIRATION RATE: 18 BRPM | DIASTOLIC BLOOD PRESSURE: 72 MMHG

## 2019-09-11 DIAGNOSIS — C66.1 UROTHELIAL CARCINOMA OF RIGHT DISTAL URETER (H): ICD-10-CM

## 2019-09-11 DIAGNOSIS — R32 URINARY INCONTINENCE, UNSPECIFIED TYPE: Primary | ICD-10-CM

## 2019-09-11 DIAGNOSIS — E05.00 GRAVES DISEASE: ICD-10-CM

## 2019-09-11 LAB
ALBUMIN SERPL-MCNC: 3.6 G/DL (ref 3.4–5)
ALP SERPL-CCNC: 98 U/L (ref 40–150)
ALT SERPL W P-5'-P-CCNC: 17 U/L (ref 0–50)
ANION GAP SERPL CALCULATED.3IONS-SCNC: 9 MMOL/L (ref 3–14)
AST SERPL W P-5'-P-CCNC: 14 U/L (ref 0–45)
BASOPHILS # BLD AUTO: 0.1 10E9/L (ref 0–0.2)
BASOPHILS NFR BLD AUTO: 0.7 %
BILIRUB SERPL-MCNC: 0.3 MG/DL (ref 0.2–1.3)
BUN SERPL-MCNC: 24 MG/DL (ref 7–30)
CALCIUM SERPL-MCNC: 8.9 MG/DL (ref 8.5–10.1)
CHLORIDE SERPL-SCNC: 100 MMOL/L (ref 94–109)
CO2 SERPL-SCNC: 24 MMOL/L (ref 20–32)
CREAT SERPL-MCNC: 1.03 MG/DL (ref 0.52–1.04)
DIFFERENTIAL METHOD BLD: ABNORMAL
EOSINOPHIL # BLD AUTO: 0.1 10E9/L (ref 0–0.7)
EOSINOPHIL NFR BLD AUTO: 1.1 %
ERYTHROCYTE [DISTWIDTH] IN BLOOD BY AUTOMATED COUNT: 12.4 % (ref 10–15)
GFR SERPL CREATININE-BSD FRML MDRD: 49 ML/MIN/{1.73_M2}
GLUCOSE SERPL-MCNC: 90 MG/DL (ref 70–99)
HCT VFR BLD AUTO: 37.4 % (ref 35–47)
HGB BLD-MCNC: 12.3 G/DL (ref 11.7–15.7)
IMM GRANULOCYTES # BLD: 0.1 10E9/L (ref 0–0.4)
IMM GRANULOCYTES NFR BLD: 0.9 %
LYMPHOCYTES # BLD AUTO: 2 10E9/L (ref 0.8–5.3)
LYMPHOCYTES NFR BLD AUTO: 26.2 %
MCH RBC QN AUTO: 33.4 PG (ref 26.5–33)
MCHC RBC AUTO-ENTMCNC: 32.9 G/DL (ref 31.5–36.5)
MCV RBC AUTO: 102 FL (ref 78–100)
MONOCYTES # BLD AUTO: 0.7 10E9/L (ref 0–1.3)
MONOCYTES NFR BLD AUTO: 9.1 %
NEUTROPHILS # BLD AUTO: 4.6 10E9/L (ref 1.6–8.3)
NEUTROPHILS NFR BLD AUTO: 62 %
NRBC # BLD AUTO: 0 10*3/UL
NRBC BLD AUTO-RTO: 0 /100
PLATELET # BLD AUTO: 229 10E9/L (ref 150–450)
POTASSIUM SERPL-SCNC: 4.4 MMOL/L (ref 3.4–5.3)
PROT SERPL-MCNC: 7.4 G/DL (ref 6.8–8.8)
RBC # BLD AUTO: 3.68 10E12/L (ref 3.8–5.2)
SODIUM SERPL-SCNC: 133 MMOL/L (ref 133–144)
T3 SERPL-MCNC: 76 NG/DL (ref 60–181)
T4 FREE SERPL-MCNC: 0.74 NG/DL (ref 0.76–1.46)
TSH SERPL DL<=0.005 MIU/L-ACNC: 1.45 MU/L (ref 0.4–4)
WBC # BLD AUTO: 7.5 10E9/L (ref 4–11)

## 2019-09-11 PROCEDURE — 84439 ASSAY OF FREE THYROXINE: CPT | Performed by: INTERNAL MEDICINE

## 2019-09-11 PROCEDURE — 99214 OFFICE O/P EST MOD 30 MIN: CPT | Mod: ZP | Performed by: INTERNAL MEDICINE

## 2019-09-11 PROCEDURE — 36415 COLL VENOUS BLD VENIPUNCTURE: CPT

## 2019-09-11 PROCEDURE — 85025 COMPLETE CBC W/AUTO DIFF WBC: CPT | Performed by: INTERNAL MEDICINE

## 2019-09-11 PROCEDURE — G0463 HOSPITAL OUTPT CLINIC VISIT: HCPCS | Mod: ZF

## 2019-09-11 PROCEDURE — 80053 COMPREHEN METABOLIC PANEL: CPT | Performed by: INTERNAL MEDICINE

## 2019-09-11 PROCEDURE — 84443 ASSAY THYROID STIM HORMONE: CPT | Performed by: INTERNAL MEDICINE

## 2019-09-11 PROCEDURE — 84480 ASSAY TRIIODOTHYRONINE (T3): CPT | Performed by: INTERNAL MEDICINE

## 2019-09-11 PROCEDURE — 84445 ASSAY OF TSI GLOBULIN: CPT | Performed by: INTERNAL MEDICINE

## 2019-09-11 ASSESSMENT — MIFFLIN-ST. JEOR: SCORE: 1032.7

## 2019-09-11 ASSESSMENT — PAIN SCALES - GENERAL: PAINLEVEL: NO PAIN (0)

## 2019-09-11 NOTE — NURSING NOTE
Chief Complaint   Patient presents with     Blood Draw     Labs drawn via  by RN in lab. VS taken.      Amy Rene RN

## 2019-09-11 NOTE — PROGRESS NOTES
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    HISTORY OF PRESENT ILLNESS:  Ms. 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 Son, Geoff accompanies her for the visit.    Ms. Oviedo is doing well overall. States she gained a few pounds. She plays cards and tries to stay active. Has no neuropathy. She denies fever, chills, shortness of breath or chest pain. There is no clinical evidence of disease progression.    Hasn't seen Dr. King recently.     ONCOLOGIC HISTORY:  1. 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   - Jan 2018 to September 2018- Persistent gross hematuria and underwent multiple procedures.    - 2/19/18 and 4/3/18- cystoscopies with bladder biopsies  which were negative for bladder tumor  - 1/17/18, 3/8/18, 7/26/18, 9/10/18- Multiple urine cytologies have come back positive by FISH for high-grade transitional cell carcinoma  - 9/10/18- Underwent a bilateral cystoureteroscopy with biopsy and brushing that showed  right upper tract cytology suspicious for high-grade urothelial ca. Right ureter brushing negative from that day. Left upper tract negative.  - 9/10/18- CT A/P without contrast showed new small bilateral pleural effusions and interstitial pulmonary edema but no evidence of locoregional or metastatic disease.  - 9/12/18- Presented to ED with oliguria, CRESENCIO, and mild hydronephrosis bilaterally on CT scan and underwent bilateral ureteral stent placment  - 11/13/2018- Right robotic nephroureterectomy, excision of sandip-caval mass and LN excision by Stuart King. Pathology showed \"Right " "nephroureterectomy: Invasive high grade urothelial carcinoma measuring 3.5 cm, located in renal pelvis and invading through renal parenchyma into perinephric fat. Urothelial carcinoma in-situ present. Margins free of tumor. Ana-caval mass: Metastatic urothelial carcinoma involving one lymph node with at least 1 cm tumor deposit. Pericaval Extranodal Extension present. Five additional benign pericaval lymph nodes. Pathologic stage: pT4N1 (1 of 6 lymph nodes).\"  - 12/11/18- PET/CT whole body demonstrated significant residual FDG avid disease. For example, \"there is an FDG avid ill-defined pericaval mass immediately medial to the surgical clips measuring approximately 2.0 x 1.4 cm, with max SUV measuring up to12.2, see series 4 image 21. Additional FDG avid retrocaval and interaortocaval lymphadenopathy are noted.There is a 1.2 cm nodule in the right hemipelvis posterior lateral tothe bladder with max SUV measuring 8.3, see series 4 image 77. The bladder is incompletely distended.\" No suspicious thoracic or bone uptake.  - 12/12/18- Started adjuvant chemotherapy (carbo+gem) per POUT trial. Cycle 2 - 1/2/19. Cycle 3 - 1/23/19. Cycle 4 - 02/13/19.  - 1/22/19 - CT C/A/P with contrast compared with prior PET/CT: \"1. New well-circumscribed soft tissue pulmonary nodules of the right lower lobe and left upper lobe. Findings could be infectious, inflammatory, or represent metastatic disease. Continued attention on follow-up recommended. Postoperative changes of right nephrectomy. No evidence for local recurrence in the present study.\"  - 3/1/2019- CT C/A/P with contrast - \"1. Bilateral pulmonary nodules measuring up to 4 mm in the right lower lobe, previously measuring 8 mm. No new or enlarging pulmonary nodules. 2. No convincing evidence for metastatic disease in the abdomen, pelvis and bones.\"  - 6/3/2019- CT C/A/P with contrast - \"1. Surgical changes of right nephrectomy without evidence of residual or recurrent disease on " "this noncontrast exam.  Consider contrast enhanced examination on follow-up. 2. No evidence of metastatic disease in the abdomen, or pelvis on noncontrast CT.  Scattered tiny pulmonary nodules in the chest are not significantly changed.  Previously described prominent right lower lobe pulmonary nodule (previously measuring up to 8 mm) is no longer visualized. 3. Stable bilateral pulmonary nodules measuring maximally 4 mm.\"  - 09/11/19: CT C/A/P without contrast - \"1. Stable exam without evidence convincing for new disease. 2. Stable pulmonary nodules. 3. 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 4. Stable heterogeneous enlargement of the thyroid with underlying nodules suggested.\"    REVIEW OF SYSTEMS: 14 point ROS negative other than the symptoms noted above in the HPI.    PAST MEDICAL AND SURGICAL HISTORY:   Past Medical History:   Diagnosis Date     Calculus of kidney      Chemotherapy-induced neutropenia (H) 3/6/2019     Esophageal reflux      GERD (gastroesophageal reflux disease)      Hyperlipidemia LDL goal <130 5/9/2010     Malignant melanoma of skin of trunk, except scrotum (H)      Nonspecific abnormal finding     has living will 2004 -      Nontoxic multinodular goiter     no further eval /tx rec per pt     Osteopenia      Other psoriasis      Personal history of colonic polyps      PMR (polymyalgia rheumatica) (H)      Stress-induced cardiomyopathy      Undiagnosed cardiac murmurs      Unspecified constipation      Unspecified essential hypertension      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     Past Surgical History:   Procedure Laterality Date     BIOPSY       C NONSPECIFIC PROCEDURE  2005    colonoscopy polyp repeat 2010     COLONOSCOPY  2014     COMBINED CYSTOSCOPY, INSERT STENT URETER(S) Bilateral 9/12/2018    Procedure: COMBINED CYSTOSCOPY, INSERT STENT URETER(S);  Cystoscopy Bilateral ureteral Stent Placement.;  Surgeon: Justin Henry MD;  Location: [facility]     COMBINED " CYSTOSCOPY, RETROGRADES, URETEROSCOPY, INSERT STENT Bilateral 4/3/2018    Procedure: COMBINED CYSTOSCOPY, RETROGRADES, URETEROSCOPY, INSERT STENT;;  Surgeon: Stuart King MD;  Location: [facility]     COMBINED CYSTOSCOPY, RETROGRADES, URETEROSCOPY, INSERT STENT Bilateral 9/10/2018    Procedure: COMBINED CYSTOSCOPY, RETROGRADES, URETEROSCOPY, INSERT STENT;  Cystoscopy, Bilateral Ureteroscopy, Bladder Biopsies, Retrogram Pyelograms, Ureteral Washings and brushings, cysview;  Surgeon: Stuart King MD;  Location: [facility]     CYSTOSCOPY, BIOPSY BLADDER INSTILL OPTICAL AGENT N/A 4/3/2018    Procedure: CYSTOSCOPY, BIOPSY BLADDER INSTILL OPTICAL AGENT;  Cystoscopy, Blue Light Cystoscopy, Bladder Biopsies, Bilateral Selective ureteral washings for Cytology, Bilateral Retrograde Pyelograms, Bilateral Ureteroscopy;  Surgeon: Stuart King MD;  Location: [facility]     CYSTOSCOPY, BIOPSY BLADDER, COMBINED N/A 2/19/2018    Procedure: COMBINED CYSTOSCOPY, BIOPSY BLADDER;  Cystoscopy, Bladder Biopsy;  Surgeon: Kenna La MD;  Location: [facility]     CYSTOSCOPY, REMOVE STENT(S), COMBINED  11/13/2018    Procedure: Flexible Cystoscopy with Stent Removal;  Surgeon: Stuart King MD;  Location: [facility]     DAVINCI LYMPHADENECTOMY N/A 11/13/2018    Procedure: Davinci Lymphadenectomy ;  Surgeon: Stuart King MD;  Location: [facility]     DAVINCI NEPHROURETERECTOMY N/A 11/13/2018    Procedure: Right DaVinci Assisted Nephroureterectomy;  Surgeon: Stuart King MD;  Location: [facility]     ENDOSCOPIC ULTRASOUND LOWER GASTROINTESTIONAL TRACT (GI) N/A 10/30/2015    Procedure: ENDOSCOPIC ULTRASOUND LOWER GASTROINTESTIONAL TRACT (GI);  Surgeon: Daniel Jean-Baptiste MD;  Location: [facility]     EYE SURGERY  12/4/17     INSERT PORT VASCULAR ACCESS Right 12/19/2018    Procedure: Chest Port Placement - right;  Surgeon: Stuart Chavez PA-C;  [facility]     IR CHEST PORT  "PLACEMENT > 5 YRS OF AGE  12/19/2018     IR PORT REMOVAL RIGHT  6/26/2019     LAPAROSCOPIC CHOLECYSTECTOMY WITH CHOLANGIOGRAMS N/A 11/1/2015    Procedure: LAPAROSCOPIC CHOLECYSTECTOMY WITH CHOLANGIOGRAMS;  Surgeon: Tonie Warren MD;  Location: [facility]     REMOVE PORT VASCULAR ACCESS Right 6/26/2019    Procedure: Right Port Removal;  Surgeon: Froilan Irizarry PA-C;  Location: [facility]     SURGICAL HISTORY OF -   1996    malignant melanoma     SURGICAL HISTORY OF -   1968    thyroid nodule     SURGICAL HISTORY OF -       D & C     SOCIAL HISTORY:   Social History     Tobacco Use     Smoking status: Never Smoker     Smokeless tobacco: Never Used   Substance Use Topics     Alcohol use: No     Alcohol/week: 0.0 oz     Drug use: No     FAMILY HISTORY:   Family History   Problem Relation Age of Onset     Cancer Father         dec - esophageal and laryngeal     Heart Disease Mother      Respiratory Mother         dec     Breast Cancer Daughter      Other Cancer Daughter      Thyroid Disease Daughter      Asthma Daughter      Hyperlipidemia Son      Diabetes Son      ALLERGIES:   Allergies   Allergen Reactions     Codeine      CURRENT MEDICATIONS:   Current Outpatient Medications:      acetaminophen (TYLENOL) 650 MG CR tablet, Take 1 tablet (650 mg) by mouth every 8 hours as needed for pain, Disp: 100 tablet, Rfl: 0     alendronate (FOSAMAX) 70 MG tablet, TAKE 1 TABLET EVERY 7 DAYS AT LEAST 60 MIN BEFORE BREAKFAST AS DIRECTED \"SEE PACKAGE FOR ADDITIONAL INSTRUCTIONS\", Disp: 12 tablet, Rfl: 3     aspirin 81 MG tablet, Take 81 mg by mouth every evening , Disp: , Rfl:      Calcium Citrate-Vitamin D (CALCIUM + D PO), Take 1 tablet by mouth 2 times daily , Disp: , Rfl:      cycloSPORINE (RESTASIS) 0.05 % ophthalmic emulsion, Place 1 drop into both eyes 2 times daily, Disp: , Rfl:      ferrous sulfate 325 (65 Fe) MG TBEC EC tablet, Take 325 mg by mouth daily, Disp: , Rfl:      furosemide (LASIX) 20 MG tablet, TAKE 1 TABLET " "(20 MG) BY MOUTH EVERY MORNING, Disp: 90 tablet, Rfl: 0     irbesartan (AVAPRO) 300 MG tablet, TAKE 1 TABLET EVERY DAY, Disp: 90 tablet, Rfl: 3     lovastatin (MEVACOR) 40 MG tablet, TAKE 1 TABLET AT BEDTIME (HYPERLIPIDEMIA LDL GOAL BELOW 130), Disp: 90 tablet, Rfl: 0     methimazole (TAPAZOLE) 5 MG tablet, 10 mg alternating with 15 mg every other day, Disp: 225 tablet, Rfl: 3     nitroGLYcerin (NITROSTAT) 0.4 MG sublingual tablet, For chest pain place 1 tablet under the tongue every 5 minutes for 3 doses. If symptoms persist 5 minutes after 1st dose call 911., Disp: 25 tablet, Rfl: 3     Omega-3 Fatty Acids (FISH OIL) 500 MG CAPS, Take 1 capsule by mouth 2 times daily , Disp: , Rfl:      omeprazole (PRILOSEC) 20 MG DR capsule, TAKE 1 CAPSULE EVERY DAY, Disp: 90 capsule, Rfl: 2     order for DME, Equipment being ordered: Knee high compression for B LE 20-30mmHg, Velcro units for night time (consider hybrid sock as foot swelling is less than leg swelling), Donning device  Size: Medium, Disp: 2 each, Rfl: 3     polyethylene glycol (MIRALAX/GLYCOLAX) packet, Take 17 g by mouth, Disp: , Rfl:      Probiotic Product (PROBIOTIC ADVANCED PO), Take 1 capsule by mouth every morning , Disp: , Rfl:      propranolol ER (INDERAL LA) 60 MG 24 hr capsule, TAKE 1 CAPSULE EVERY DAY, Disp: 90 capsule, Rfl: 1     rOPINIRole (REQUIP) 0.25 MG tablet, 0.25 mg in the afternoon and 0.5 mg at night, Disp: 270 tablet, Rfl: 1     senna-docusate (SENOKOT-S;PERICOLACE) 8.6-50 MG per tablet, Take 1 tablet by mouth 2 times daily To prevent constipation, Disp: 60 tablet, Rfl: 0     sertraline (ZOLOFT) 100 MG tablet, TAKE 1 TABLET (100 MG) BY MOUTH EVERY MORNING, Disp: 90 tablet, Rfl: 2    PHYSICAL EXAMINATION:  Vital signs: BP (!) 157/72 (BP Location: Right arm, Patient Position: Sitting, Cuff Size: Adult Regular)   Pulse 55   Temp 98.7  F (37.1  C) (Oral)   Resp 18   Ht 1.473 m (4' 9.99\")   Wt 70.3 kg (155 lb)   SpO2 98%   BMI 32.40 kg/m  "   ECOG performance status of 1. Living independently at home.  GENERAL: Well-nourished healthy-appearing sitting in chair, no acute distress.   HEENT: No icterus, no pallor. Moist mucous membranes. Oropharynx is clear.   NECK: Supple, no JVD/LAD.  LUNGS: Clear to ausculation bilaterally, normal work of breathing.   CARDIOVASCULAR: Regular rate and rhythm, no murmurs, gallops or rubs.   ABDOMEN: Soft, nontender and nondistended, no palpable masses.  EXTREMITIES: No cyanosis, no clubbing, b/l LE edema improved.  NEUROLOGIC: No focal deficits, CN 2-12 grossly intact.  LYMPH NODE EXAM: No palpable adenopathy - cervical, axillary or inguinal.     LABORATORY DATA:   Lab Test 09/11/19  1242 06/12/19  1427 03/06/19  1010   WBC 7.5 8.0 10.5   RBC 3.68* 3.83 3.05*   HGB 12.3 12.4 9.2*   HCT 37.4 37.3 28.9*   * 97 95   MCH 33.4* 32.4 30.2   MCHC 32.9 33.2 31.8   RDW 12.4 13.1 22.0*    203 48*   NEUTROPHIL 62.0 70.5 78.3    133 131*   POTASSIUM 4.4 4.5 4.6   CHLORIDE 100 98 100   CO2 24 28 25   ANIONGAP 9 6 6   GLC 90 89 116*   BUN 24 28 26   CR 1.03 0.99 1.02   SHREYA 8.9 8.7 8.8   PROTTOTAL 7.4 7.1 7.2   ALBUMIN 3.6 3.5 3.3*   BILITOTAL 0.3 0.4 0.6   ALKPHOS 98 98 127   AST 14 18 19   ALT 17 26 20     IMAGING STUDIES:  As above.    ASSESSMENT AND PLAN: Ms. Oviedo is a delightful 86-year-old lady with recently diagnosed localized stage IV ([id number]) high-grade, muscle-invasive transitional cell carcinoma of right renal pelvis, status post right robotic nephroureterectomy, here for follow-up.     1. Upper tract urothelial cancer:   - She completed 4 cycles of adjuvant carboplatin plus gemcitabine chemotherapy per POUT trial. Has recovered well overall.   - We reviewed the restaging CT scan results with her and son at length. There is no clear evidence of disease on the scan, which is an excellent finding. Lung findings will need to be monitored closely.  - She is now on active surveillance.  We discussed what  this involves, including close monitoring for new or worsening signs or symptoms such as shortness of breath, chest pain, abdominal pain, unexplained weight loss, hematuria etc.  - As part of active surveillance, she will also continue to see me every 3 months for the next year with restaging CT chest abdomen pelvis without contrast due to her renal function.   - Seen by Dr. King on 19. Will message Dr. King's team to setup a surveillance cystoscopy in the coming weeks.     2. Renal artery saccular aneursym:  - Management per IR team.   3. Chemo induced anemia and thrombocytopenia:  - Resolved.   4. History of afib and palpitations:  - Pt encouraged to keep cardiology follow-ups.   Return to see me in 3 months.  All of the above was explained to the patient in lay language and several questions were answered. They are in agreement with the plan.  BILLIN.  Jaden Toledo M.D.  . Professor of Medicine  Genitourinary Oncology  Division of Hematology, Oncology & Transplantation  [facility]

## 2019-09-11 NOTE — LETTER
9/11/2019       RE: Dimple Oviedo  [address]  [facility] [address]     Dear Colleague,    Thank you for referring your patient, Dimple Oviedo, to the [facility]. Please see a copy of my visit note below.    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    HISTORY OF PRESENT ILLNESS:  Ms. 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 Son, Geoff accompanies her for the visit.    Ms. Oviedo is doing well overall. States she gained a few pounds. She plays cards and tries to stay active. Has no neuropathy. She denies fever, chills, shortness of breath or chest pain. There is no clinical evidence of disease progression.    Hasn't seen Dr. King recently.     ONCOLOGIC HISTORY:  1. 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   - Jan 2018 to September 2018- Persistent gross hematuria and underwent multiple procedures.    - 2/19/18 and 4/3/18- cystoscopies with bladder biopsies  which were negative for bladder tumor  - 1/17/18, 3/8/18, 7/26/18, 9/10/18- Multiple urine cytologies have come back positive by FISH for high-grade transitional cell carcinoma  - 9/10/18- Underwent a bilateral cystoureteroscopy with biopsy and brushing that showed  right upper tract cytology suspicious for high-grade urothelial ca. Right ureter brushing negative from that day. Left upper tract negative.  - 9/10/18- CT A/P without contrast showed new small bilateral pleural effusions and interstitial pulmonary edema but no evidence of locoregional or metastatic disease.  - 9/12/18- Presented to ED with oliguria, CRESENCIO,  "and mild hydronephrosis bilaterally on CT scan and underwent bilateral ureteral stent placment  - 11/13/2018- Right robotic nephroureterectomy, excision of sandip-caval mass and LN excision by Stuart King. Pathology showed \"Right nephroureterectomy: Invasive high grade urothelial carcinoma measuring 3.5 cm, located in renal pelvis and invading through renal parenchyma into perinephric fat. Urothelial carcinoma in-situ present. Margins free of tumor. Sandip-caval mass: Metastatic urothelial carcinoma involving one lymph node with at least 1 cm tumor deposit. Pericaval Extranodal Extension present. Five additional benign pericaval lymph nodes. Pathologic stage: pT4N1 (1 of 6 lymph nodes).\"  - 12/11/18- PET/CT whole body demonstrated significant residual FDG avid disease. For example, \"there is an FDG avid ill-defined pericaval mass immediately medial to the surgical clips measuring approximately 2.0 x 1.4 cm, with max SUV measuring up to12.2, see series 4 image 21. Additional FDG avid retrocaval and interaortocaval lymphadenopathy are noted.There is a 1.2 cm nodule in the right hemipelvis posterior lateral tothe bladder with max SUV measuring 8.3, see series 4 image 77. The bladder is incompletely distended.\" No suspicious thoracic or bone uptake.  - 12/12/18- Started adjuvant chemotherapy (carbo+gem) per POUT trial. Cycle 2 - 1/2/19. Cycle 3 - 1/23/19. Cycle 4 - 02/13/19.  - 1/22/19 - CT C/A/P with contrast compared with prior PET/CT: \"1. New well-circumscribed soft tissue pulmonary nodules of the right lower lobe and left upper lobe. Findings could be infectious, inflammatory, or represent metastatic disease. Continued attention on follow-up recommended. Postoperative changes of right nephrectomy. No evidence for local recurrence in the present study.\"  - 3/1/2019- CT C/A/P with contrast - \"1. Bilateral pulmonary nodules measuring up to 4 mm in the right lower lobe, previously measuring 8 mm. No new or enlarging " "pulmonary nodules. 2. No convincing evidence for metastatic disease in the abdomen, pelvis and bones.\"  - 6/3/2019- CT C/A/P with contrast - \"1. Surgical changes of right nephrectomy without evidence of residual or recurrent disease on this noncontrast exam.  Consider contrast enhanced examination on follow-up. 2. No evidence of metastatic disease in the abdomen, or pelvis on noncontrast CT.  Scattered tiny pulmonary nodules in the chest are not significantly changed.  Previously described prominent right lower lobe pulmonary nodule (previously measuring up to 8 mm) is no longer visualized. 3. Stable bilateral pulmonary nodules measuring maximally 4 mm.\"  - 09/11/19: CT C/A/P without contrast - \"1. Stable exam without evidence convincing for new disease. 2. Stable pulmonary nodules. 3. 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 4. Stable heterogeneous enlargement of the thyroid with underlying nodules suggested.\"    REVIEW OF SYSTEMS: 14 point ROS negative other than the symptoms noted above in the HPI.    PAST MEDICAL AND SURGICAL HISTORY:   Past Medical History:   Diagnosis Date     Calculus of kidney      Chemotherapy-induced neutropenia (H) 3/6/2019     Esophageal reflux      GERD (gastroesophageal reflux disease)      Hyperlipidemia LDL goal <130 5/9/2010     Malignant melanoma of skin of trunk, except scrotum (H)      Nonspecific abnormal finding     has living will 2004 -      Nontoxic multinodular goiter     no further eval /tx rec per pt     Osteopenia      Other psoriasis      Personal history of colonic polyps      PMR (polymyalgia rheumatica) (H)      Stress-induced cardiomyopathy      Undiagnosed cardiac murmurs      Unspecified constipation      Unspecified essential hypertension      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     Past Surgical History:   Procedure Laterality Date     BIOPSY       C NONSPECIFIC PROCEDURE  2005    colonoscopy polyp repeat 2010     COLONOSCOPY  2014     COMBINED " CYSTOSCOPY, INSERT STENT URETER(S) Bilateral 9/12/2018    Procedure: COMBINED CYSTOSCOPY, INSERT STENT URETER(S);  Cystoscopy Bilateral ureteral Stent Placement.;  Surgeon: Justin Henry MD;  [facility]     COMBINED CYSTOSCOPY, RETROGRADES, URETEROSCOPY, INSERT STENT Bilateral 4/3/2018    Procedure: COMBINED CYSTOSCOPY, RETROGRADES, URETEROSCOPY, INSERT STENT;;  Surgeon: Stuart King MD;  [facility]     COMBINED CYSTOSCOPY, RETROGRADES, URETEROSCOPY, INSERT STENT Bilateral 9/10/2018    Procedure: COMBINED CYSTOSCOPY, RETROGRADES, URETEROSCOPY, INSERT STENT;  Cystoscopy, Bilateral Ureteroscopy, Bladder Biopsies, Retrogram Pyelograms, Ureteral Washings and brushings, cysview;  Surgeon: Stuart King MD;  [facility]     CYSTOSCOPY, BIOPSY BLADDER INSTILL OPTICAL AGENT N/A 4/3/2018    Procedure: CYSTOSCOPY, BIOPSY BLADDER INSTILL OPTICAL AGENT;  Cystoscopy, Blue Light Cystoscopy, Bladder Biopsies, Bilateral Selective ureteral washings for Cytology, Bilateral Retrograde Pyelograms, Bilateral Ureteroscopy;  Surgeon: Stuart King MD;  [facility]     CYSTOSCOPY, BIOPSY BLADDER, COMBINED N/A 2/19/2018    Procedure: COMBINED CYSTOSCOPY, BIOPSY BLADDER;  Cystoscopy, Bladder Biopsy;  Surgeon: Kenna La MD;  [facility]     CYSTOSCOPY, REMOVE STENT(S), COMBINED  11/13/2018    Procedure: Flexible Cystoscopy with Stent Removal;  Surgeon: Stuart King MD;  [facility]     DAVINCI LYMPHADENECTOMY N/A 11/13/2018    Procedure: Davinci Lymphadenectomy ;  Surgeon: Stuart King MD;  [facility]     DAVINCI NEPHROURETERECTOMY N/A 11/13/2018    Procedure: Right DaVinci Assisted Nephroureterectomy;  Surgeon: Stuart King MD;  [facility]     ENDOSCOPIC ULTRASOUND LOWER GASTROINTESTIONAL TRACT (GI) N/A 10/30/2015    Procedure: ENDOSCOPIC ULTRASOUND LOWER GASTROINTESTIONAL TRACT (GI);  Surgeon: Daniel Jean-Baptiste  "MD Lorne;  Location: [facility]     EYE SURGERY  12/4/17     INSERT PORT VASCULAR ACCESS Right 12/19/2018    Procedure: Chest Port Placement - right;  Surgeon: Stuart Chavez PA-C;  [facility]     IR CHEST PORT PLACEMENT > 5 YRS OF AGE  12/19/2018     IR PORT REMOVAL RIGHT  6/26/2019     LAPAROSCOPIC CHOLECYSTECTOMY WITH CHOLANGIOGRAMS N/A 11/1/2015    Procedure: LAPAROSCOPIC CHOLECYSTECTOMY WITH CHOLANGIOGRAMS;  Surgeon: Tonie Warren MD;  [facility]     REMOVE PORT VASCULAR ACCESS Right 6/26/2019    Procedure: Right Port Removal;  Surgeon: Froilan Irizarry PA-C;  [facility]     SURGICAL HISTORY OF -   1996    malignant melanoma     SURGICAL HISTORY OF -   1968    thyroid nodule     SURGICAL HISTORY OF -       D & C     SOCIAL HISTORY:   Social History     Tobacco Use     Smoking status: Never Smoker     Smokeless tobacco: Never Used   Substance Use Topics     Alcohol use: No     Alcohol/week: 0.0 oz     Drug use: No     FAMILY HISTORY:   Family History   Problem Relation Age of Onset     Cancer Father         dec - esophageal and laryngeal     Heart Disease Mother      Respiratory Mother         dec     Breast Cancer Daughter      Other Cancer Daughter      Thyroid Disease Daughter      Asthma Daughter      Hyperlipidemia Son      Diabetes Son      ALLERGIES:   Allergies   Allergen Reactions     Codeine      CURRENT MEDICATIONS:   Current Outpatient Medications:      acetaminophen (TYLENOL) 650 MG CR tablet, Take 1 tablet (650 mg) by mouth every 8 hours as needed for pain, Disp: 100 tablet, Rfl: 0     alendronate (FOSAMAX) 70 MG tablet, TAKE 1 TABLET EVERY 7 DAYS AT LEAST 60 MIN BEFORE BREAKFAST AS DIRECTED \"SEE PACKAGE FOR ADDITIONAL INSTRUCTIONS\", Disp: 12 tablet, Rfl: 3     aspirin 81 MG tablet, Take 81 mg by mouth every evening , Disp: , Rfl:      Calcium Citrate-Vitamin D (CALCIUM + D PO), Take 1 tablet by mouth 2 times daily , Disp: , Rfl:      cycloSPORINE (RESTASIS) 0.05 " % ophthalmic emulsion, Place 1 drop into both eyes 2 times daily, Disp: , Rfl:      ferrous sulfate 325 (65 Fe) MG TBEC EC tablet, Take 325 mg by mouth daily, Disp: , Rfl:      furosemide (LASIX) 20 MG tablet, TAKE 1 TABLET (20 MG) BY MOUTH EVERY MORNING, Disp: 90 tablet, Rfl: 0     irbesartan (AVAPRO) 300 MG tablet, TAKE 1 TABLET EVERY DAY, Disp: 90 tablet, Rfl: 3     lovastatin (MEVACOR) 40 MG tablet, TAKE 1 TABLET AT BEDTIME (HYPERLIPIDEMIA LDL GOAL BELOW 130), Disp: 90 tablet, Rfl: 0     methimazole (TAPAZOLE) 5 MG tablet, 10 mg alternating with 15 mg every other day, Disp: 225 tablet, Rfl: 3     nitroGLYcerin (NITROSTAT) 0.4 MG sublingual tablet, For chest pain place 1 tablet under the tongue every 5 minutes for 3 doses. If symptoms persist 5 minutes after 1st dose call 911., Disp: 25 tablet, Rfl: 3     Omega-3 Fatty Acids (FISH OIL) 500 MG CAPS, Take 1 capsule by mouth 2 times daily , Disp: , Rfl:      omeprazole (PRILOSEC) 20 MG DR capsule, TAKE 1 CAPSULE EVERY DAY, Disp: 90 capsule, Rfl: 2     order for DME, Equipment being ordered: Knee high compression for B LE 20-30mmHg, Velcro units for night time (consider hybrid sock as foot swelling is less than leg swelling), Donning device  Size: Medium, Disp: 2 each, Rfl: 3     polyethylene glycol (MIRALAX/GLYCOLAX) packet, Take 17 g by mouth, Disp: , Rfl:      Probiotic Product (PROBIOTIC ADVANCED PO), Take 1 capsule by mouth every morning , Disp: , Rfl:      propranolol ER (INDERAL LA) 60 MG 24 hr capsule, TAKE 1 CAPSULE EVERY DAY, Disp: 90 capsule, Rfl: 1     rOPINIRole (REQUIP) 0.25 MG tablet, 0.25 mg in the afternoon and 0.5 mg at night, Disp: 270 tablet, Rfl: 1     senna-docusate (SENOKOT-S;PERICOLACE) 8.6-50 MG per tablet, Take 1 tablet by mouth 2 times daily To prevent constipation, Disp: 60 tablet, Rfl: 0     sertraline (ZOLOFT) 100 MG tablet, TAKE 1 TABLET (100 MG) BY MOUTH EVERY MORNING, Disp: 90 tablet, Rfl: 2    PHYSICAL EXAMINATION:  Vital signs: BP  "(!) 157/72 (BP Location: Right arm, Patient Position: Sitting, Cuff Size: Adult Regular)   Pulse 55   Temp 98.7  F (37.1  C) (Oral)   Resp 18   Ht 1.473 m (4' 9.99\")   Wt 70.3 kg (155 lb)   SpO2 98%   BMI 32.40 kg/m     ECOG performance status of 1. Living independently at home.  GENERAL: Well-nourished healthy-appearing sitting in chair, no acute distress.   HEENT: No icterus, no pallor. Moist mucous membranes. Oropharynx is clear.   NECK: Supple, no JVD/LAD.  LUNGS: Clear to ausculation bilaterally, normal work of breathing.   CARDIOVASCULAR: Regular rate and rhythm, no murmurs, gallops or rubs.   ABDOMEN: Soft, nontender and nondistended, no palpable masses.  EXTREMITIES: No cyanosis, no clubbing, b/l LE edema improved.  NEUROLOGIC: No focal deficits, CN 2-12 grossly intact.  LYMPH NODE EXAM: No palpable adenopathy - cervical, axillary or inguinal.     LABORATORY DATA:   Lab Test 09/11/19  1242 06/12/19  1427 03/06/19  1010   WBC 7.5 8.0 10.5   RBC 3.68* 3.83 3.05*   HGB 12.3 12.4 9.2*   HCT 37.4 37.3 28.9*   * 97 95   MCH 33.4* 32.4 30.2   MCHC 32.9 33.2 31.8   RDW 12.4 13.1 22.0*    203 48*   NEUTROPHIL 62.0 70.5 78.3    133 131*   POTASSIUM 4.4 4.5 4.6   CHLORIDE 100 98 100   CO2 24 28 25   ANIONGAP 9 6 6   GLC 90 89 116*   BUN 24 28 26   CR 1.03 0.99 1.02   SHREYA 8.9 8.7 8.8   PROTTOTAL 7.4 7.1 7.2   ALBUMIN 3.6 3.5 3.3*   BILITOTAL 0.3 0.4 0.6   ALKPHOS 98 98 127   AST 14 18 19   ALT 17 26 20     IMAGING STUDIES:  As above.    ASSESSMENT AND PLAN: Ms. 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     1. Upper tract urothelial cancer:   - She completed 4 cycles of adjuvant carboplatin plus gemcitabine chemotherapy per POUT trial. Has recovered well overall.   - We reviewed the restaging CT scan " results with her and son at length. There is no clear evidence of disease on the scan, which is an excellent finding. Lung findings will need to be monitored closely.  - She is now on active surveillance.  We discussed what this involves, including close monitoring for new or worsening signs or symptoms such as shortness of breath, chest pain, abdominal pain, unexplained weight loss, hematuria etc.  - As part of active surveillance, she will also continue to see me every 3 months for the next year with restaging CT chest abdomen pelvis without contrast due to her renal function.   - Seen by Dr. King on 19. Will message Dr. King's team to setup a surveillance cystoscopy in the coming weeks.     2. Renal artery saccular aneursym:  - Management per IR team.   3. Chemo induced anemia and thrombocytopenia:  - Resolved.   4. History of afib and palpitations:  - Pt encouraged to keep cardiology follow-ups.   Return to see me in 3 months.  All of the above was explained to the patient in lay language and several questions were answered. They are in agreement with the plan.  BILLIN.  Jaden Toledo M.D.  . Professor of Medicine  Genitourinary Oncology  Division of Hematology, Oncology & Transplantation  [facility]

## 2019-09-11 NOTE — NURSING NOTE
"Oncology Rooming Note    September 11, 2019 2:44 PM   Dimple Oviedo is a 86 year old female who presents for:    Chief Complaint   Patient presents with     Blood Draw     Labs drawn via  by RN in lab. VS taken.      Oncology Clinic Visit     [facility] RETURN- RENAL CA     Initial Vitals: BP (!) 157/72 (BP Location: Right arm, Patient Position: Sitting, Cuff Size: Adult Regular)   Pulse 55   Temp 98.7  F (37.1  C) (Oral)   Resp 18   Ht 1.473 m (4' 9.99\")   Wt 70.3 kg (155 lb)   SpO2 98%   BMI 32.40 kg/m   Estimated body mass index is 32.4 kg/m  as calculated from the following:    Height as of this encounter: 1.473 m (4' 9.99\").    Weight as of this encounter: 70.3 kg (155 lb). Body surface area is 1.7 meters squared.  No Pain (0) Comment: Data Unavailable   No LMP recorded. Patient is postmenopausal.  Allergies reviewed: Yes  Medications reviewed: Yes    Medications: Medication refills not needed today.  Pharmacy name entered into [employer]    [id number] - [place], [place] - [address] AT [facility] [address] [facility] - [address]  [id number] - [place], [place] - [address] AT [facility] [address]    Clinical concerns: No new concerns. [place] Toledo was notified.      Gabriel Caruso LPN            "

## 2019-09-12 DIAGNOSIS — E78.5 HYPERLIPIDEMIA LDL GOAL <130: ICD-10-CM

## 2019-09-12 NOTE — TELEPHONE ENCOUNTER
"Requested Prescriptions   Pending Prescriptions Disp Refills     lovastatin (MEVACOR) 40 MG tablet [Pharmacy Med Name: LOVASTATIN 40 MG Tablet] 90 tablet 0     Sig: TAKE 1 TABLET AT BEDTIME (HYPERLIPIDEMIA LDL GOAL BELOW 130)  Last Written Prescription Date:  7/12/2019  Last Fill Quantity: 90 tablet,  # refills: 0   Last Office Visit: 9/4/2019   Future Office Visit:            Statins Protocol Failed - 9/12/2019  2:01 PM        Failed - LDL on file in past 12 months     Recent Labs   Lab Test 12/14/17  0722   LDL 56           Passed - No abnormal creatine kinase in past 12 months     Recent Labs   Lab Test 06/18/14  0825   CKT 56            Passed - Recent (12 mo) or future (30 days) visit within the authorizing provider's specialty     Patient had office visit in the last 12 months or has a visit in the next 30 days with authorizing provider or within the authorizing provider's specialty.  See \"Patient Info\" tab in inbasket, or \"Choose Columns\" in Meds & Orders section of the refill encounter.            Passed - Medication is active on med list        Passed - Patient is age 18 or older        Passed - No active pregnancy on record        Passed - No positive pregnancy test in past 12 months          "

## 2019-09-12 NOTE — LETTER
September 17, 2019      Dimple Oivedo  [address]   [facility] [address]        Dear Dimple,     In order to ensure we are providing the best quality care, we have reviewed your chart and see   that you are due for :     Annual wellness & fasting labs         Please call the clinic at your earliest convenience to schedule an appointment.    We greatly appreciate the opportunity to serve you and thank you for trusting us with your health care.      Your health care team at [facility]         Sincerely,        Pop Zapien MD, MD

## 2019-09-15 RX ORDER — LOVASTATIN 40 MG
TABLET ORAL
Qty: 90 TABLET | Refills: 0 | Status: SHIPPED | OUTPATIENT
Start: 2019-09-15 | End: 2019-10-04

## 2019-09-15 NOTE — TELEPHONE ENCOUNTER
Medication is being filled for 1 time refill only due to:  Patient needs labs FASTING.  Please call to schedule wellness exam and fasting labs.

## 2019-09-17 DIAGNOSIS — M25.561 BILATERAL KNEE PAIN: Primary | ICD-10-CM

## 2019-09-17 DIAGNOSIS — M25.562 BILATERAL KNEE PAIN: Primary | ICD-10-CM

## 2019-09-17 LAB — TSI SER-ACNC: 3.2 TSI INDEX

## 2019-09-18 ENCOUNTER — PRE VISIT (OUTPATIENT)
Dept: UROLOGY | Facility: CLINIC | Age: 84
End: 2019-09-18

## 2019-09-18 NOTE — TELEPHONE ENCOUNTER
Reason for Visit: Cystoscopy    Diagnosis: Bladder cancer, cystoscopy to evaluate chemo    Orders/Procedures/Records: Records available    Contact Patient: N/A    Rooming Requirements: Cystoscopy with urine      Yajaira Rios  09/18/19  9:18 AM

## 2019-09-19 ASSESSMENT — ENCOUNTER SYMPTOMS
MUSCLE WEAKNESS: 0
JOINT SWELLING: 0
HEMATURIA: 0
MUSCLE CRAMPS: 0
HYPERTENSION: 1
HYPOTENSION: 0
LEG PAIN: 1
FLANK PAIN: 0
HYPERTENSION: 1
SLEEP DISTURBANCES DUE TO BREATHING: 0
ORTHOPNEA: 0
LEG PAIN: 1
DIFFICULTY URINATING: 0
STIFFNESS: 0
DIFFICULTY URINATING: 0
EXERCISE INTOLERANCE: 1
MYALGIAS: 0
LIGHT-HEADEDNESS: 0
SYNCOPE: 0
ARTHRALGIAS: 1
SYNCOPE: 0
HEMATURIA: 0
LIGHT-HEADEDNESS: 0
BACK PAIN: 1
DYSURIA: 0
SLEEP DISTURBANCES DUE TO BREATHING: 0
ARTHRALGIAS: 1
BACK PAIN: 1
NECK PAIN: 0
NECK PAIN: 0
JOINT SWELLING: 0
PALPITATIONS: 0
MYALGIAS: 0
ORTHOPNEA: 0
PALPITATIONS: 0
DYSURIA: 0
STIFFNESS: 0
MUSCLE CRAMPS: 0
EXERCISE INTOLERANCE: 1
HYPOTENSION: 0
FLANK PAIN: 0
MUSCLE WEAKNESS: 0

## 2019-09-25 ENCOUNTER — HOSPITAL ENCOUNTER (INPATIENT)
Facility: CLINIC | Age: 84
Setting detail: SURGERY ADMIT
End: 2019-09-25
Attending: ORTHOPAEDIC SURGERY | Admitting: ORTHOPAEDIC SURGERY
Payer: MEDICARE

## 2019-09-25 ENCOUNTER — TELEPHONE (OUTPATIENT)
Dept: ORTHOPEDICS | Facility: CLINIC | Age: 84
End: 2019-09-25

## 2019-09-25 ENCOUNTER — ANCILLARY PROCEDURE (OUTPATIENT)
Dept: GENERAL RADIOLOGY | Facility: CLINIC | Age: 84
End: 2019-09-25
Attending: ORTHOPAEDIC SURGERY
Payer: MEDICARE

## 2019-09-25 ENCOUNTER — DOCUMENTATION ONLY (OUTPATIENT)
Dept: ORTHOPEDICS | Facility: CLINIC | Age: 84
End: 2019-09-25

## 2019-09-25 ENCOUNTER — PRE VISIT (OUTPATIENT)
Dept: ORTHOPEDICS | Facility: CLINIC | Age: 84
End: 2019-09-25

## 2019-09-25 ENCOUNTER — OFFICE VISIT (OUTPATIENT)
Dept: ORTHOPEDICS | Facility: CLINIC | Age: 84
End: 2019-09-25
Payer: MEDICARE

## 2019-09-25 DIAGNOSIS — M17.11 PRIMARY LOCALIZED OSTEOARTHRITIS OF RIGHT KNEE: ICD-10-CM

## 2019-09-25 DIAGNOSIS — M17.12 PRIMARY LOCALIZED OSTEOARTHRITIS OF LEFT KNEE: Primary | ICD-10-CM

## 2019-09-25 DIAGNOSIS — M25.562 BILATERAL KNEE PAIN: ICD-10-CM

## 2019-09-25 DIAGNOSIS — M25.561 BILATERAL KNEE PAIN: ICD-10-CM

## 2019-09-25 NOTE — NURSING NOTE
Reason For Visit:   Chief Complaint   Patient presents with     Consult     Bilat osteoarthritis       Primary MD: Pop Zapien  Referring MD: Referred Self        There were no vitals taken for this visit.    Pain Assessment  Patient Currently in Pain: Yes  0-10 Pain Scale: 4(right worse then left)  Other Pain Locations: recently had a steroid shot, hurts with activity    Current Outpatient Medications   Medication     acetaminophen (TYLENOL) 650 MG CR tablet     alendronate (FOSAMAX) 70 MG tablet     aspirin 81 MG tablet     Calcium Citrate-Vitamin D (CALCIUM + D PO)     cycloSPORINE (RESTASIS) 0.05 % ophthalmic emulsion     ferrous sulfate 325 (65 Fe) MG TBEC EC tablet     furosemide (LASIX) 20 MG tablet     irbesartan (AVAPRO) 300 MG tablet     lovastatin (MEVACOR) 40 MG tablet     methimazole (TAPAZOLE) 5 MG tablet     nitroGLYcerin (NITROSTAT) 0.4 MG sublingual tablet     Omega-3 Fatty Acids (FISH OIL) 500 MG CAPS     omeprazole (PRILOSEC) 20 MG DR capsule     order for DME     polyethylene glycol (MIRALAX/GLYCOLAX) packet     Probiotic Product (PROBIOTIC ADVANCED PO)     propranolol ER (INDERAL LA) 60 MG 24 hr capsule     rOPINIRole (REQUIP) 0.25 MG tablet     senna-docusate (SENOKOT-S;PERICOLACE) 8.6-50 MG per tablet     sertraline (ZOLOFT) 100 MG tablet     No current facility-administered medications for this visit.           Allergies   Allergen Reactions     Shauna Polo LPN

## 2019-09-25 NOTE — LETTER
9/25/2019       RE: Dimple Oviedo  [address]  [facility] [address]     Dear Colleague,    Thank you for referring your patient, Dimple Oviedo, to the [facility] at [facility]. Please see a copy of my visit note below.    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 Otero around February 2020  Recommend patient begin preoperative physical therapy in order to aid in her strengthening and improve her postoperative recovery  Patient should get clearance from her oncologist leading up to surgery and have her absolute neutrophil count checked prior to surgery  She see the PAC prior to surgery    Seen and discussed with Dr. Branden Ingram MD 09/25/2019  Orthopaedic Surgery Resident    Attending MD (Dr. Edward Otero) :  I performed the history and physical exam of the patient and discussed the patient's management with the resident and patient. I reviewed the above note and agree with the documented findings and plan of care, which I communicated to the patient.      I had a long discussion with the patient regarding ongoing management options.  Reviewed surgical and nonsurgical treatments.  We reviewed total knee replacement in detail including the procedure, the implants, the recovery process, and long-term outcomes.  We reviewed that the risks of the surgery include but are not limited to infection, wound problems, stiffness, persistent pain, swelling, clicking, loosening, revision surgery.  We also reviewed less common risks such as neurovascular injury  fracture, and other implant-related issues.  We reviewed other medical complications such as a blood clot.  We discussed that the vast majority of cases have a highly successful outcome.  However there is a small subset of patients that do experience complications or problems following the knee replacement and these problems can be very debilitating and painful and sometimes do not improve. Based on a discussion of the risks and benefits, we believe that the benefits far outweigh the risks at this point and the patient   would like to proceed with surgery.  We will work on scheduling surgery at a time that works well for them in the next few months.  They will contact us if they have any questions or concerns leading up to surgery.    Edward Otero M.D.     Arthritis and Joint Replacement  Department of Orthopaedic Surgery, [facility]  [email or web address]  [phone] (pager)            History of Present Illness:   Patient was seen and examined by me. History, PMH, Meds, SH, complete ROS (10 organ systems) and PE reviewed with patient and prior medical records.      Dimple is a very pleasant 86-year-old female patient with a past medical history significant for urothelial carcinoma status post nephrectomy and chemotherapy (chemotherapy completed February 2019), hypertension, obesity, bilateral lower extremity lymphedema who presents today with a prolonged history of right knee pain.  She is that her right knee is gotten to the point where it bothers her enough to seek surgical intervention.  Her discomfort is not constant but it is regular enough that it impairs her activities particularly with weightbearing.  She uses a cane on a regular basis.  She has a history of several corticosteroid injections was performed which provide her with 100% pain relief, most recently 2 weeks ago.  She is never had any skin issues with her lymphedema and manages it pretty steadily with compression  stockings.  She has a history of chemo-induced neutropenia but has completed chemotherapy back in February and has had recent normal labs.  She wishes to see got a total knee replacement after the new year as she is going to [place] over the holidays to visit her daughter and her great-grandchildren.  She is nondiabetic and she has no history of DVT, stroke or heart attack.  She is only on a baby aspirin for anticoagulation. She currently takes fosamax, vit D and Ca for osteoporosis          Past Medical History:     Past Medical History:   Diagnosis Date     Calculus of kidney      Chemotherapy-induced neutropenia (H) 3/6/2019     Esophageal reflux      GERD (gastroesophageal reflux disease)      Hyperlipidemia LDL goal <130 5/9/2010     Malignant melanoma of skin of trunk, except scrotum (H)      Nonspecific abnormal finding     has living will 2004 -      Nontoxic multinodular goiter     no further eval /tx rec per pt     Osteopenia      Other psoriasis      Personal history of colonic polyps      PMR (polymyalgia rheumatica) (H)      Stress-induced cardiomyopathy      Undiagnosed cardiac murmurs      Unspecified constipation      Unspecified essential hypertension      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             Past Surgical History:     Past Surgical History:   Procedure Laterality Date     BIOPSY       C NONSPECIFIC PROCEDURE  2005    colonoscopy polyp repeat 2010     COLONOSCOPY  2014     COMBINED CYSTOSCOPY, INSERT STENT URETER(S) Bilateral 9/12/2018    Procedure: COMBINED CYSTOSCOPY, INSERT STENT URETER(S);  Cystoscopy Bilateral ureteral Stent Placement.;  Surgeon: Justin Henry MD;  Location: [facility]     COMBINED CYSTOSCOPY, RETROGRADES, URETEROSCOPY, INSERT STENT Bilateral 4/3/2018    Procedure: COMBINED CYSTOSCOPY, RETROGRADES, URETEROSCOPY, INSERT STENT;;  Surgeon: Stuart King MD;  [facility]     COMBINED CYSTOSCOPY, RETROGRADES, URETEROSCOPY, INSERT STENT Bilateral  9/10/2018    Procedure: COMBINED CYSTOSCOPY, RETROGRADES, URETEROSCOPY, INSERT STENT;  Cystoscopy, Bilateral Ureteroscopy, Bladder Biopsies, Retrogram Pyelograms, Ureteral Washings and brushings, cysview;  Surgeon: Stuart King MD;  [facility]     CYSTOSCOPY, BIOPSY BLADDER INSTILL OPTICAL AGENT N/A 4/3/2018    Procedure: CYSTOSCOPY, BIOPSY BLADDER INSTILL OPTICAL AGENT;  Cystoscopy, Blue Light Cystoscopy, Bladder Biopsies, Bilateral Selective ureteral washings for Cytology, Bilateral Retrograde Pyelograms, Bilateral Ureteroscopy;  Surgeon: Stuart King MD;  [facility]     CYSTOSCOPY, BIOPSY BLADDER, COMBINED N/A 2/19/2018    Procedure: COMBINED CYSTOSCOPY, BIOPSY BLADDER;  Cystoscopy, Bladder Biopsy;  Surgeon: Kenna La MD;  [facility]: [facility]     CYSTOSCOPY, REMOVE STENT(S), COMBINED  11/13/2018    Procedure: Flexible Cystoscopy with Stent Removal;  Surgeon: Stuart King MD;  [facility]     DAVINCI LYMPHADENECTOMY N/A 11/13/2018    Procedure: Davinci Lymphadenectomy ;  Surgeon: Staurt King MD;  [facility]     DAVINCI NEPHROURETERECTOMY N/A 11/13/2018    Procedure: Right DaVinci Assisted Nephroureterectomy;  Surgeon: Stuart King MD;  [facility]     ENDOSCOPIC ULTRASOUND LOWER GASTROINTESTIONAL TRACT (GI) N/A 10/30/2015    Procedure: ENDOSCOPIC ULTRASOUND LOWER GASTROINTESTIONAL TRACT (GI);  Surgeon: Daniel Jean-Baptiste MD;  Location: [facility]     EYE SURGERY  12/4/17     INSERT PORT VASCULAR ACCESS Right 12/19/2018    Procedure: Chest Port Placement - right;  Surgeon: Stuart Chavez PA-C;  [facility]     IR CHEST PORT PLACEMENT > 5 YRS OF AGE  12/19/2018     IR PORT REMOVAL RIGHT  6/26/2019     LAPAROSCOPIC CHOLECYSTECTOMY WITH CHOLANGIOGRAMS N/A 11/1/2015    Procedure: LAPAROSCOPIC CHOLECYSTECTOMY WITH CHOLANGIOGRAMS;  Surgeon: Tonie Warren MD;  [facility]     REMOVE PORT VASCULAR  ACCESS Right 6/26/2019    Procedure: Right Port Removal;  Surgeon: Froilan Irizarry PA-C;  [facility]     SURGICAL HISTORY OF -   1996    malignant melanoma     SURGICAL HISTORY OF -   1968    thyroid nodule     SURGICAL HISTORY OF -       D & C             Social History:     Social History     Socioeconomic History     Marital status:      Spouse name:      Number of children: 2     Years of education: Not on file     Highest education level: Not on file   Occupational History     Employer: RETIRED   Social Needs     Financial resource strain: Not on file     Food insecurity:     Worry: Not on file     Inability: Not on file     Transportation needs:     Medical: Not on file     Non-medical: Not on file   Tobacco Use     Smoking status: Never Smoker     Smokeless tobacco: Never Used   Substance and Sexual Activity     Alcohol use: No     Alcohol/week: 0.0 standard drinks     Drug use: No     Sexual activity: Yes     Partners: Male   Lifestyle     Physical activity:     Days per week: Not on file     Minutes per session: Not on file     Stress: Not on file   Relationships     Social connections:     Talks on phone: Not on file     Gets together: Not on file     Attends Quaker service: Not on file     Active member of club or organization: Not on file     Attends meetings of clubs or organizations: Not on file     Relationship status: Not on file     Intimate partner violence:     Fear of current or ex partner: Not on file     Emotionally abused: Not on file     Physically abused: Not on file     Forced sexual activity: Not on file   Other Topics Concern      Service No     Blood Transfusions No     Caffeine Concern Not Asked     Occupational Exposure Not Asked     Hobby Hazards Not Asked     Sleep Concern Not Asked     Stress Concern Not Asked     Weight Concern Not Asked     Special Diet Not Asked     Back Care Not Asked     Exercise Yes     Comment: curves     Bike Helmet Not Asked  "    Seat Belt Yes     Self-Exams Yes     Parent/sibling w/ CABG, MI or angioplasty before 65F 55M? No   Social History Narrative    December 7, 2009    Balanced Diet - Yes    Osteoporosis Prevention Measures - Dairy servings per day: 2 and Medication/Supplements (See current meds)    Regular Exercise -  Yes Describe curves, walking    Dental Exam - YES - Date: 10/2009    Eye Exam - YES - Date: 2008    Self Breast Exam - Yes    Abuse: Current or Past (Physical, Sexual or Emotional)- No    Do you feel safe in your environment - Yes    Guns stored in the home - No    Sunscreen used - Yes    Seatbelts used - Yes    Lipids -  YES - Date: 11/24/2009    Glucose -  YES - Date: 11/24/2009    Colon Cancer Screening - Colonoscopy 11/18/2005(date completed)    Hemoccults - YES - Date: 10/12/2004    Pap Test -  YES - Date: 09/22/2004    Do you have any concerns about STD's -  No    Mammography - YES - Date: 11/24/2009    DEXA - YES - Date: 11/20/2008    Immunizations reviewed and up to date - Yes    PAULETTE Spencer, [facility]                     Family History:     Family History   Problem Relation Age of Onset     Cancer Father         dec - esophageal and laryngeal     Heart Disease Mother      Respiratory Mother         dec     Breast Cancer Daughter      Other Cancer Daughter      Thyroid Disease Daughter      Asthma Daughter      Hyperlipidemia Son      Diabetes Son               Medications:     Current Outpatient Medications   Medication Sig     acetaminophen (TYLENOL) 650 MG CR tablet Take 1 tablet (650 mg) by mouth every 8 hours as needed for pain     alendronate (FOSAMAX) 70 MG tablet TAKE 1 TABLET EVERY 7 DAYS AT LEAST 60 MIN BEFORE BREAKFAST AS DIRECTED \"SEE PACKAGE FOR ADDITIONAL INSTRUCTIONS\"     aspirin 81 MG tablet Take 81 mg by mouth every evening      Calcium Citrate-Vitamin D (CALCIUM + D PO) Take 1 tablet by mouth 2 times daily      cycloSPORINE (RESTASIS) 0.05 % ophthalmic emulsion Place 1 drop into both eyes 2 times " daily     ferrous sulfate 325 (65 Fe) MG TBEC EC tablet Take 325 mg by mouth daily     furosemide (LASIX) 20 MG tablet TAKE 1 TABLET (20 MG) BY MOUTH EVERY MORNING     irbesartan (AVAPRO) 300 MG tablet TAKE 1 TABLET EVERY DAY     lovastatin (MEVACOR) 40 MG tablet TAKE 1 TABLET AT BEDTIME (HYPERLIPIDEMIA LDL GOAL BELOW 130)     methimazole (TAPAZOLE) 5 MG tablet 10 mg alternating with 15 mg every other day     nitroGLYcerin (NITROSTAT) 0.4 MG sublingual tablet For chest pain place 1 tablet under the tongue every 5 minutes for 3 doses. If symptoms persist 5 minutes after 1st dose call 911.     Omega-3 Fatty Acids (FISH OIL) 500 MG CAPS Take 1 capsule by mouth 2 times daily      omeprazole (PRILOSEC) 20 MG DR capsule TAKE 1 CAPSULE EVERY DAY     order for DME Equipment being ordered: Knee high compression for B LE 20-30mmHg, Velcro units for night time (consider hybrid sock as foot swelling is less than leg swelling), Donning device    Size: Medium     polyethylene glycol (MIRALAX/GLYCOLAX) packet Take 17 g by mouth     Probiotic Product (PROBIOTIC ADVANCED PO) Take 1 capsule by mouth every morning      propranolol ER (INDERAL LA) 60 MG 24 hr capsule TAKE 1 CAPSULE EVERY DAY     rOPINIRole (REQUIP) 0.25 MG tablet 0.25 mg in the afternoon and 0.5 mg at night     senna-docusate (SENOKOT-S;PERICOLACE) 8.6-50 MG per tablet Take 1 tablet by mouth 2 times daily To prevent constipation     sertraline (ZOLOFT) 100 MG tablet TAKE 1 TABLET (100 MG) BY MOUTH EVERY MORNING     No current facility-administered medications for this visit.              Allergies:      Allergies   Allergen Reactions     Codeine             Review of Systems:   A comprehensive 10 point review of systems (constitutional, ENT, cardiac, peripheral vascular, respiratory, GI, , Musculoskeletal, skin, Neurological) was performed and found to be negative except as described in this note.           Physical Exam:   COMPLETE EXAMINATION:   VITAL SIGNS: There  were no vitals taken for this visit.  RESP: Non labored breathing  SKIN: Grossly normal   MUSCULOSKELETAL:   Focused examination of the right knee demonstrates a valgus alignment  No overlying skin changes or warmth to touch  She is stable to varus and valgus stress with some passive correction on varus stress  Range of motion 15 to 115 degrees with some mild crepitus  Sensorimotor exam distally is fully intact  Toes are warm and well-perfused and she has palpable pulse  Sensation is fully intact distally without any reported tingling or numbness          Data:   Radiographic evaluation of her bilateral lower extremities and bilateral knees demonstrate advanced tricompartmental osteoarthritis with significant osteophytosis and subchondral sclerosis.  She has a valgus alignment of her right knee    Again, thank you for allowing me to participate in the care of your patient.      Sincerely,    Edward Otero MD

## 2019-09-25 NOTE — TELEPHONE ENCOUNTER
Left detailed VM for patient to call back [phone] and reschedule her surgery with Dr. Otero on 2/10/20, because he will be out of town.     Requested the patient to call back to reschedule

## 2019-09-25 NOTE — NURSING NOTE
Teaching Flowsheet   Relevant Diagnosis: Right Total Knee Arthroplasty  Teaching Topic: Pre op teaching.     Person(s) involved in teaching:   Patient     Motivation Level:  Asks Questions: Yes  Eager to Learn: Yes  Cooperative: Yes  Receptive (willing/able to accept information): Yes  Any cultural factors/Taoism beliefs that may influence understanding or compliance? No       Patient demonstrates understanding of the following:  Reason for the appointment, diagnosis and treatment plan: Yes  Knowledge of proper use of medications and conditions for which they are ordered (with special attention to potential side effects or drug interactions): Yes  Which situations necessitate calling provider and whom to contact: Yes       Teaching Concerns Addressed: Rn discussed all aspects of pre op surgery including location, pre op shower, meds and post pain mgmt. Chiki schedules patient surgery dates and PAC. Patient is given joint replacement class info and packet, antibacterial soap and reminder form for taking ABX for future dental work. Patient's /champion is td Stone. RN explained to patient about the concept of narcotic e-scribe. Patient verbalized understanding. Patient has no further questions at this point.        Proper use and care of meds (medical equip, care aids, etc.): Yes  Nutritional needs and diet plan: Yes  Pain management techniques: Yes  Wound Care: Yes  How and/when to access community resources: Yes     Instructional Materials Used/Given: Pre op packet, antibacterial soap, Joint replacement class info and packet and dental work ABX reminder.     Time spent with patient: 15 minutes.

## 2019-09-25 NOTE — PROGRESS NOTES
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 Otero around February 2020  Recommend patient begin preoperative physical therapy in order to aid in her strengthening and improve her postoperative recovery  Patient should get clearance from her oncologist leading up to surgery and have her absolute neutrophil count checked prior to surgery  She see the PAC prior to surgery    Seen and discussed with Dr. Branden Ingram MD 09/25/2019  Orthopaedic Surgery Resident    Attending MD (Dr. Edward Otero) :  I performed the history and physical exam of the patient and discussed the patient's management with the resident and patient. I reviewed the above note and agree with the documented findings and plan of care, which I communicated to the patient.      I had a long discussion with the patient regarding ongoing management options.  Reviewed surgical and nonsurgical treatments.  We reviewed total knee replacement in detail including the procedure, the implants, the recovery process, and long-term outcomes.  We reviewed that the risks of the surgery include but are not limited to infection, wound problems, stiffness, persistent pain, swelling, clicking, loosening, revision surgery.  We also reviewed less common risks such as neurovascular injury fracture, and other implant-related issues.  We reviewed other medical complications such as a blood clot.  We discussed that the vast majority of cases have a highly successful outcome.  However there is a small subset of patients that do experience complications or problems following the knee  replacement and these problems can be very debilitating and painful and sometimes do not improve. Based on a discussion of the risks and benefits, we believe that the benefits far outweigh the risks at this point and the patient   would like to proceed with surgery.  We will work on scheduling surgery at a time that works well for them in the next few months.  They will contact us if they have any questions or concerns leading up to surgery.        Edward Otero M.D.     Arthritis and Joint Replacement  Department of Orthopaedic Surgery, [facility]  [email or web address]  [phone] (pager)              History of Present Illness:   Patient was seen and examined by me. History, PMH, Meds, SH, complete ROS (10 organ systems) and PE reviewed with patient and prior medical records.      Dimple is a very pleasant 86-year-old female patient with a past medical history significant for urothelial carcinoma status post nephrectomy and chemotherapy (chemotherapy completed February 2019), hypertension, obesity, bilateral lower extremity lymphedema who presents today with a prolonged history of right knee pain.  She is that her right knee is gotten to the point where it bothers her enough to seek surgical intervention.  Her discomfort is not constant but it is regular enough that it impairs her activities particularly with weightbearing.  She uses a cane on a regular basis.  She has a history of several corticosteroid injections was performed which provide her with 100% pain relief, most recently 2 weeks ago.  She is never had any skin issues with her lymphedema and manages it pretty steadily with compression stockings.  She has a history of chemo-induced neutropenia but has completed chemotherapy back in February and has had recent normal labs.  She wishes to see got a total knee replacement after the new year as she is going to [place] over the holidays to visit her daughter and her  great-grandchildren.  She is nondiabetic and she has no history of DVT, stroke or heart attack.  She is only on a baby aspirin for anticoagulation. She currently takes fosamax, vit D and Ca for osteoporosis          Past Medical History:     Past Medical History:   Diagnosis Date     Calculus of kidney      Chemotherapy-induced neutropenia (H) 3/6/2019     Esophageal reflux      GERD (gastroesophageal reflux disease)      Hyperlipidemia LDL goal <130 5/9/2010     Malignant melanoma of skin of trunk, except scrotum (H)      Nonspecific abnormal finding     has living will 2004 -      Nontoxic multinodular goiter     no further eval /tx rec per pt     Osteopenia      Other psoriasis      Personal history of colonic polyps      PMR (polymyalgia rheumatica) (H)      Stress-induced cardiomyopathy      Undiagnosed cardiac murmurs      Unspecified constipation      Unspecified essential hypertension      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             Past Surgical History:     Past Surgical History:   Procedure Laterality Date     BIOPSY       C NONSPECIFIC PROCEDURE  2005    colonoscopy polyp repeat 2010     COLONOSCOPY  2014     COMBINED CYSTOSCOPY, INSERT STENT URETER(S) Bilateral 9/12/2018    Procedure: COMBINED CYSTOSCOPY, INSERT STENT URETER(S);  Cystoscopy Bilateral ureteral Stent Placement.;  Surgeon: Justin Henry MD;  [facility]     COMBINED CYSTOSCOPY, RETROGRADES, URETEROSCOPY, INSERT STENT Bilateral 4/3/2018    Procedure: COMBINED CYSTOSCOPY, RETROGRADES, URETEROSCOPY, INSERT STENT;;  Surgeon: Stuart King MD;  [facility]     COMBINED CYSTOSCOPY, RETROGRADES, URETEROSCOPY, INSERT STENT Bilateral 9/10/2018    Procedure: COMBINED CYSTOSCOPY, RETROGRADES, URETEROSCOPY, INSERT STENT;  Cystoscopy, Bilateral Ureteroscopy, Bladder Biopsies, Retrogram Pyelograms, Ureteral Washings and brushings, cysview;  Surgeon: Stuart King MD;  [facility]     CYSTOSCOPY, BIOPSY  BLADDER INSTILL OPTICAL AGENT N/A 4/3/2018    Procedure: CYSTOSCOPY, BIOPSY BLADDER INSTILL OPTICAL AGENT;  Cystoscopy, Blue Light Cystoscopy, Bladder Biopsies, Bilateral Selective ureteral washings for Cytology, Bilateral Retrograde Pyelograms, Bilateral Ureteroscopy;  Surgeon: Stuart King MD;  [facility]     CYSTOSCOPY, BIOPSY BLADDER, COMBINED N/A 2/19/2018    Procedure: COMBINED CYSTOSCOPY, BIOPSY BLADDER;  Cystoscopy, Bladder Biopsy;  Surgeon: Kenna La MD;  [facility]     CYSTOSCOPY, REMOVE STENT(S), COMBINED  11/13/2018    Procedure: Flexible Cystoscopy with Stent Removal;  Surgeon: Stuart King MD;  [facility]     DAVINCI LYMPHADENECTOMY N/A 11/13/2018    Procedure: Davinci Lymphadenectomy ;  Surgeon: Stuart King MD;  [facility]     DAVINCI NEPHROURETERECTOMY N/A 11/13/2018    Procedure: Right DaVinci Assisted Nephroureterectomy;  Surgeon: Stuart King MD;  [facility]     ENDOSCOPIC ULTRASOUND LOWER GASTROINTESTIONAL TRACT (GI) N/A 10/30/2015    Procedure: ENDOSCOPIC ULTRASOUND LOWER GASTROINTESTIONAL TRACT (GI);  Surgeon: Daniel Jean-Baptiste MD;  [facility]     EYE SURGERY  12/4/17     INSERT PORT VASCULAR ACCESS Right 12/19/2018    Procedure: Chest Port Placement - right;  Surgeon: Stuart Chavez PA-C;  [facility]     IR CHEST PORT PLACEMENT > 5 YRS OF AGE  12/19/2018     IR PORT REMOVAL RIGHT  6/26/2019     LAPAROSCOPIC CHOLECYSTECTOMY WITH CHOLANGIOGRAMS N/A 11/1/2015    Procedure: LAPAROSCOPIC CHOLECYSTECTOMY WITH CHOLANGIOGRAMS;  Surgeon: Tonie Warren MD;  [facility]     REMOVE PORT VASCULAR ACCESS Right 6/26/2019    Procedure: Right Port Removal;  Surgeon: Froilan Irizarry PA-C;  [facility]     SURGICAL HISTORY OF -   1996    malignant melanoma     SURGICAL HISTORY OF -   1968    thyroid nodule     SURGICAL HISTORY OF -       D & C             Social History:      Social History     Socioeconomic History     Marital status:      Spouse name:      Number of children: 2     Years of education: Not on file     Highest education level: Not on file   Occupational History     Employer: RETIRED   Social Needs     Financial resource strain: Not on file     Food insecurity:     Worry: Not on file     Inability: Not on file     Transportation needs:     Medical: Not on file     Non-medical: Not on file   Tobacco Use     Smoking status: Never Smoker     Smokeless tobacco: Never Used   Substance and Sexual Activity     Alcohol use: No     Alcohol/week: 0.0 standard drinks     Drug use: No     Sexual activity: Yes     Partners: Male   Lifestyle     Physical activity:     Days per week: Not on file     Minutes per session: Not on file     Stress: Not on file   Relationships     Social connections:     Talks on phone: Not on file     Gets together: Not on file     Attends Anabaptism service: Not on file     Active member of club or organization: Not on file     Attends meetings of clubs or organizations: Not on file     Relationship status: Not on file     Intimate partner violence:     Fear of current or ex partner: Not on file     Emotionally abused: Not on file     Physically abused: Not on file     Forced sexual activity: Not on file   Other Topics Concern      Service No     Blood Transfusions No     Caffeine Concern Not Asked     Occupational Exposure Not Asked     Hobby Hazards Not Asked     Sleep Concern Not Asked     Stress Concern Not Asked     Weight Concern Not Asked     Special Diet Not Asked     Back Care Not Asked     Exercise Yes     Comment: curves     Bike Helmet Not Asked     Seat Belt Yes     Self-Exams Yes     Parent/sibling w/ CABG, MI or angioplasty before 65F 55M? No   Social History Narrative    December 7, 2009    Balanced Diet - Yes    Osteoporosis Prevention Measures - Dairy servings per day: 2 and Medication/Supplements (See current meds)  "   Regular Exercise -  Yes Describe curves, walking    Dental Exam - YES - Date: 10/2009    Eye Exam - YES - Date: 2008    Self Breast Exam - Yes    Abuse: Current or Past (Physical, Sexual or Emotional)- No    Do you feel safe in your environment - Yes    Guns stored in the home - No    Sunscreen used - Yes    Seatbelts used - Yes    Lipids -  YES - Date: 11/24/2009    Glucose -  YES - Date: 11/24/2009    Colon Cancer Screening - Colonoscopy 11/18/2005(date completed)    Hemoccults - YES - Date: 10/12/2004    Pap Test -  YES - Date: 09/22/2004    Do you have any concerns about STD's -  No    Mammography - YES - Date: 11/24/2009    DEXA - YES - Date: 11/20/2008    Immunizations reviewed and up to date - Yes    PAULETTE Spencer CMA                     Family History:     Family History   Problem Relation Age of Onset     Cancer Father         dec - esophageal and laryngeal     Heart Disease Mother      Respiratory Mother         dec     Breast Cancer Daughter      Other Cancer Daughter      Thyroid Disease Daughter      Asthma Daughter      Hyperlipidemia Son      Diabetes Son               Medications:     Current Outpatient Medications   Medication Sig     acetaminophen (TYLENOL) 650 MG CR tablet Take 1 tablet (650 mg) by mouth every 8 hours as needed for pain     alendronate (FOSAMAX) 70 MG tablet TAKE 1 TABLET EVERY 7 DAYS AT LEAST 60 MIN BEFORE BREAKFAST AS DIRECTED \"SEE PACKAGE FOR ADDITIONAL INSTRUCTIONS\"     aspirin 81 MG tablet Take 81 mg by mouth every evening      Calcium Citrate-Vitamin D (CALCIUM + D PO) Take 1 tablet by mouth 2 times daily      cycloSPORINE (RESTASIS) 0.05 % ophthalmic emulsion Place 1 drop into both eyes 2 times daily     ferrous sulfate 325 (65 Fe) MG TBEC EC tablet Take 325 mg by mouth daily     furosemide (LASIX) 20 MG tablet TAKE 1 TABLET (20 MG) BY MOUTH EVERY MORNING     irbesartan (AVAPRO) 300 MG tablet TAKE 1 TABLET EVERY DAY     lovastatin (MEVACOR) 40 MG tablet TAKE 1 TABLET AT " BEDTIME (HYPERLIPIDEMIA LDL GOAL BELOW 130)     methimazole (TAPAZOLE) 5 MG tablet 10 mg alternating with 15 mg every other day     nitroGLYcerin (NITROSTAT) 0.4 MG sublingual tablet For chest pain place 1 tablet under the tongue every 5 minutes for 3 doses. If symptoms persist 5 minutes after 1st dose call 911.     Omega-3 Fatty Acids (FISH OIL) 500 MG CAPS Take 1 capsule by mouth 2 times daily      omeprazole (PRILOSEC) 20 MG DR capsule TAKE 1 CAPSULE EVERY DAY     order for DME Equipment being ordered: Knee high compression for B LE 20-30mmHg, Velcro units for night time (consider hybrid sock as foot swelling is less than leg swelling), Donning device    Size: Medium     polyethylene glycol (MIRALAX/GLYCOLAX) packet Take 17 g by mouth     Probiotic Product (PROBIOTIC ADVANCED PO) Take 1 capsule by mouth every morning      propranolol ER (INDERAL LA) 60 MG 24 hr capsule TAKE 1 CAPSULE EVERY DAY     rOPINIRole (REQUIP) 0.25 MG tablet 0.25 mg in the afternoon and 0.5 mg at night     senna-docusate (SENOKOT-S;PERICOLACE) 8.6-50 MG per tablet Take 1 tablet by mouth 2 times daily To prevent constipation     sertraline (ZOLOFT) 100 MG tablet TAKE 1 TABLET (100 MG) BY MOUTH EVERY MORNING     No current facility-administered medications for this visit.              Allergies:      Allergies   Allergen Reactions     Codeine             Review of Systems:   A comprehensive 10 point review of systems (constitutional, ENT, cardiac, peripheral vascular, respiratory, GI, , Musculoskeletal, skin, Neurological) was performed and found to be negative except as described in this note.           Physical Exam:   COMPLETE EXAMINATION:   VITAL SIGNS: There were no vitals taken for this visit.  RESP: Non labored breathing  SKIN: Grossly normal   MUSCULOSKELETAL:   Focused examination of the right knee demonstrates a valgus alignment  No overlying skin changes or warmth to touch  She is stable to varus and valgus stress with some  passive correction on varus stress  Range of motion 15 to 115 degrees with some mild crepitus  Sensorimotor exam distally is fully intact  Toes are warm and well-perfused and she has palpable pulse  Sensation is fully intact distally without any reported tingling or numbness          Data:   Radiographic evaluation of her bilateral lower extremities and bilateral knees demonstrate advanced tricompartmental osteoarthritis with significant osteophytosis and subchondral sclerosis.  She has a valgus alignment of her right knee  Answers for HPI/ROS submitted by the patient on 9/19/2019   General Symptoms: No  Skin Symptoms: No  HENT Symptoms: No  EYE SYMPTOMS: No  HEART SYMPTOMS: Yes  LUNG SYMPTOMS: No  INTESTINAL SYMPTOMS: No  URINARY SYMPTOMS: Yes  GYNECOLOGIC SYMPTOMS: No  BREAST SYMPTOMS: No  SKELETAL SYMPTOMS: Yes  BLOOD SYMPTOMS: No  NERVOUS SYSTEM SYMPTOMS: No  MENTAL HEALTH SYMPTOMS: No  Chest pain or pressure: No  Fast or irregular heartbeat: No  Pain in legs with walking: Yes  Trouble breathing while lying down: No  Fingers or toes appear blue: No  High blood pressure: Yes  Low blood pressure: No  Fainting: No  Murmurs: No  Pacemaker: No  Varicose veins: No  Edema or swelling: Yes  Wake up at night with shortness of breath: No  Light-headedness: No  Exercise intolerance: Yes  Trouble holding urine or incontinence: Yes  Pain or burning: No  Trouble starting or stopping: No  Blood in urine: No  Decreased frequency of urination: No  Frequent nighttime urination: Yes  Flank pain: No  Difficulty emptying bladder: No  Back pain: Yes  Muscle aches: No  Neck pain: No  Swollen joints: No  Joint pain: Yes  Bone pain: No  Muscle cramps: No  Muscle weakness: No  Joint stiffness: No  Bone fracture: No

## 2019-09-25 NOTE — PROGRESS NOTES
Patient is scheduled for surgery with Dr. Otero       Spoke or left message with: patient in clinic     Date of Surgery: 2/10/20    Location: [facility]     Informed patient they will need an adult  yes    Post-ops unable to be made     Pre-op with surgeon (if applicable): yes     H&P: Scheduled with PAC 1/30/20     Additional imaging/appointments: n/a     Surgery packet: given in clinic     Additional comments: n/a

## 2019-10-02 ENCOUNTER — HEALTH MAINTENANCE LETTER (OUTPATIENT)
Age: 84
End: 2019-10-02

## 2019-10-02 ENCOUNTER — OFFICE VISIT (OUTPATIENT)
Dept: UROLOGY | Facility: CLINIC | Age: 84
End: 2019-10-02
Attending: INTERNAL MEDICINE
Payer: MEDICARE

## 2019-10-02 VITALS
HEIGHT: 57 IN | WEIGHT: 159 LBS | BODY MASS INDEX: 34.3 KG/M2 | SYSTOLIC BLOOD PRESSURE: 166 MMHG | DIASTOLIC BLOOD PRESSURE: 65 MMHG | HEART RATE: 76 BPM

## 2019-10-02 DIAGNOSIS — C66.1 UROTHELIAL CARCINOMA OF RIGHT DISTAL URETER (H): ICD-10-CM

## 2019-10-02 DIAGNOSIS — N39.41 URGE INCONTINENCE OF URINE: Primary | ICD-10-CM

## 2019-10-02 DIAGNOSIS — G89.29 CHRONIC BILATERAL LOW BACK PAIN WITHOUT SCIATICA: ICD-10-CM

## 2019-10-02 DIAGNOSIS — M54.50 CHRONIC BILATERAL LOW BACK PAIN WITHOUT SCIATICA: ICD-10-CM

## 2019-10-02 DIAGNOSIS — I48.91 ATRIAL FIBRILLATION, UNSPECIFIED TYPE (H): ICD-10-CM

## 2019-10-02 DIAGNOSIS — C68.9 UROTHELIAL CARCINOMA (H): ICD-10-CM

## 2019-10-02 DIAGNOSIS — M16.10 HIP ARTHRITIS: ICD-10-CM

## 2019-10-02 LAB
ALBUMIN UR-MCNC: NEGATIVE MG/DL
APPEARANCE UR: CLEAR
BILIRUB UR QL STRIP: NEGATIVE
COLOR UR AUTO: YELLOW
GLUCOSE UR STRIP-MCNC: NEGATIVE MG/DL
HGB UR QL STRIP: ABNORMAL
KETONES UR STRIP-MCNC: NEGATIVE MG/DL
LEUKOCYTE ESTERASE UR QL STRIP: NEGATIVE
NITRATE UR QL: NEGATIVE
PH UR STRIP: 5.5 PH (ref 5–7)
SP GR UR STRIP: 1.01 (ref 1–1.03)
UROBILINOGEN UR STRIP-ACNC: 0.2 EU/DL (ref 0.2–1)

## 2019-10-02 PROCEDURE — 81003 URINALYSIS AUTO W/O SCOPE: CPT

## 2019-10-02 PROCEDURE — G0463 HOSPITAL OUTPT CLINIC VISIT: HCPCS | Mod: ZF

## 2019-10-02 ASSESSMENT — MIFFLIN-ST. JEOR: SCORE: 1035.1

## 2019-10-02 NOTE — PROGRESS NOTES
"October 2, 2019    Return visit    Patient returns today to see me for her urinary incontinence.  Patient had last seen me in 2018 for gross hematuria and positive urine cytology.  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  She is scheduled for cystoscopy with Dr King tomorrow.  Patient has also been evaluated by orthopedics and is scheduled for a right total knee arthroplasty in February with Dr Otero.    She is here for urinary incontinence that she states that she has for years.  She states that she primarily has had urinary urgency incontinence.  She tried physical therapy many years ago.      BP (!) 166/65 (BP Location: Left arm, Patient Position: Chair)   Pulse 76   Ht 1.448 m (4' 9\")   Wt 72.1 kg (159 lb)   Breastfeeding? No   BMI 34.41 kg/m    She is comfortable, in no distress, non-labored breathing.     PVR 0 mL by bladder scan    A/P: 86 year old F with urinary urgency incontinence in the setting of history of high grade TCC and right knee osteoarthritis    We discussed that urge incontinence treatments include observation, weight loss, medications most commonly anticholinergics, physical therapy, biofeedback, intravesical botulinum toxin, percutaneous tibial nerve stimulation and sacral neuromodulation.  We also discussed that if it is a musculoskeletal dysfunction that her symptoms may improve after recovery from her knee surgery.  At this time she is not interested in a medication and does not want to do physical therapy over the winter as she does not like driving in the snow.     Reminded her importance of keeping her appointment with Dr King tomorrow    RTC 3-4 months after recovering from her hip surgery, sooner if needed    15 minutes were spent with the patient today, > 50% in counseling and coordination of care    Kenna La MD MPH   of Urology    CC  Patient Care " Team:  Pop Zapien MD as PCP - General (Family Practice)  Crista Richards, APRN CNP as PCP - Cardiology (Nurse Practitioner)  Vincenzo Tovar MD as MD (Family Medicine - Sports Medicine)  Candelaria Raymundo MD as MD (Gastroenterology)  Shavon Bustamante PA-C as Physician Assistant (Physician Assistant)  Kenna La MD as MD (Urology)  Cristiana Correa, RN as Registered Nurse (Urology)  Pop Zapien MD as Referring Physician (Family Practice)  Kiera Figueroa, ATUL as Registered Nurse (Urology)  Stuart King MD as MD (Urology)  Kiera Figueroa, RN as Registered Nurse (Urology)  Geovanna Fregoso PA as Physician Assistant (Urology)  Albert Ahuja MD as MD (Internal Medicine-Hematology & Oncology)  Rose Estrella, RN as Nurse Coordinator (Oncology)  Brenda Tijerina, ATUL as Specialty Care Coordinator (Hematology & Oncology)  Kenna La MD as MD (Urology)  Albert Ahuja MD as Referring Physician (Internal Medicine-Hematology & Oncology)  Summer Gaona MD as Assigned PCP  ALBERT AHUJA

## 2019-10-02 NOTE — NURSING NOTE
Chief Complaint   Patient presents with     Consult For     Urinary incontinence       See [facility] [place] 10/2/2019    PVR 0mL by bladder scan

## 2019-10-02 NOTE — LETTER
"10/2/2019       RE: Dimple Oviedo  [address]  [facility] [address]     Dear Colleague,    Thank you for referring your patient, Dimple Oviedo, to the [facility] at [facility]. Please see a copy of my visit note below.    October 2, 2019    Return visit    Patient returns today to see me for her urinary incontinence.  Patient had last seen me in 2018 for gross hematuria and positive urine cytology.  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  She is scheduled for cystoscopy with Dr King tomorrow.  Patient has also been evaluated by orthopedics and is scheduled for a right total knee arthroplasty in February with Dr Otero.    She is here for urinary incontinence that she states that she has for years.  She states that she primarily has had urinary urgency incontinence.  She tried physical therapy many years ago.      BP (!) 166/65 (BP Location: Left arm, Patient Position: Chair)   Pulse 76   Ht 1.448 m (4' 9\")   Wt 72.1 kg (159 lb)   Breastfeeding? No   BMI 34.41 kg/m     She is comfortable, in no distress, non-labored breathing.     PVR 0 mL by bladder scan    A/P: 86 year old F with urinary urgency incontinence in the setting of history of high grade TCC and right knee osteoarthritis    We discussed that urge incontinence treatments include observation, weight loss, medications most commonly anticholinergics, physical therapy, biofeedback, intravesical botulinum toxin, percutaneous tibial nerve stimulation and sacral neuromodulation.  We also discussed that if it is a musculoskeletal dysfunction that her symptoms may improve after recovery from her knee surgery.  At this time she is not interested in a medication and does not want to do physical therapy over the winter as she does not like driving in the snow.     Reminded her " importance of keeping her appointment with Dr King tomorrow    RTC 3-4 months after recovering from her hip surgery, sooner if needed    15 minutes were spent with the patient today, > 50% in counseling and coordination of care    Kenna La MD MPH   of Urology    CC  Patient Care Team:  Pop Zapien MD as PCP - General (Family Practice)  Crista Richards, APRN CNP as PCP - Cardiology (Nurse Practitioner)  Vincenzo Tovar MD as MD (Family Medicine - Sports Medicine)  Candelaria Raymundo MD as MD (Gastroenterology)  Shavon Bustamante PA-C as Physician Assistant (Physician Assistant)  Cristiana Correa, RN as Registered Nurse (Urology)  Pop Zapien MD as Referring Physician (Family Practice)  Kiera Figueroa, ATUL as Registered Nurse (Urology)  Stuart King MD as MD (Urology)  Kiera Figueroa, RN as Registered Nurse (Urology)  Geovanna Fregoso PA as Physician Assistant (Urology)  Jaden Toledo MD as MD (Internal Medicine-Hematology & Oncology)  Rose Estrella, RN as Nurse Coordinator (Oncology)  Brenda Tijerina, ATUL as Specialty Care Coordinator (Hematology & Oncology)  Summer Gaona MD as Assigned PCP                  Again, thank you for allowing me to participate in the care of your patient.      Sincerely,    Kenna La MD

## 2019-10-02 NOTE — PATIENT INSTRUCTIONS
Websites with free information:    [employer] patient website: www.voicesforpfd.org    Total Control Program: www.totalcontrolprogram.com    Please see me back 3-4 months after you recover from your knee surgery [phone]    It was a pleasure meeting with you today.  Thank you for allowing me and my team the privilege of caring for you today.  YOU are the reason we are here, and I truly hope we provided you with the excellent service you deserve.  Please let us know if there is anything else we can do for you so that we can be sure you are leaving completely satisfied with your care experience.

## 2019-10-03 ENCOUNTER — OFFICE VISIT (OUTPATIENT)
Dept: UROLOGY | Facility: CLINIC | Age: 84
End: 2019-10-03
Payer: MEDICARE

## 2019-10-03 VITALS
WEIGHT: 159 LBS | HEART RATE: 70 BPM | HEIGHT: 57 IN | DIASTOLIC BLOOD PRESSURE: 70 MMHG | SYSTOLIC BLOOD PRESSURE: 150 MMHG | BODY MASS INDEX: 34.3 KG/M2

## 2019-10-03 DIAGNOSIS — C67.9 MALIGNANT NEOPLASM OF URINARY BLADDER, UNSPECIFIED SITE (H): Primary | ICD-10-CM

## 2019-10-03 PROCEDURE — 88112 CYTOPATH CELL ENHANCE TECH: CPT | Performed by: UROLOGY

## 2019-10-03 PROCEDURE — 88120 CYTP URNE 3-5 PROBES EA SPEC: CPT | Performed by: UROLOGY

## 2019-10-03 ASSESSMENT — PAIN SCALES - GENERAL: PAINLEVEL: NO PAIN (0)

## 2019-10-03 ASSESSMENT — MIFFLIN-ST. JEOR: SCORE: 1035.1

## 2019-10-03 NOTE — PROGRESS NOTES
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 Right nephroureterectomy on 2018. She completed chemotherapy (gem/carbo). Here for bladder evaluation     Plan:  -Urine cytology and FISH.  -Depending upon results urine studies, will decide whether to biopsy small erythematous lesion seen in bladder during cystoscopy.   -Follow up in 6 months for cystoscopy.    Jen Reagan MD  PGY-1 Urology    Attestation:  This patient was seen and evaluated by me, with the house staff team or resident(s).  I have reviewed the note above and agree.      I personally performed the cystosocpy    Stuart King MD  Department of Urology  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 The patient is following yolanda Toledo of Hematology and Oncology. Per Dr. Toledo's note, on 18 Mr. Oviedo- Started adjuvant chemotherapy (carbo+gem) per POUT trial. Cycle 2 - 19. Cycle 3 - 19. Cycle 4 - 19.     Date of last abdominal and pelvis imaging: pre-op PET 18  Date of last chest imaging: pre-op PET 18  Any recurrence? N/A    Review of Systems:   Pertinent items are noted in HPI or as below, remainder of complete ROS is negative.      Cystoscopy:   After sterile preparation and draping of the patient,  a 16-Palestinian flexible cystoscope was introduced via the urethra.  It was passed without difficulty into the bladder.  The urethra was open without evidence of stricture.  The ureteral orifices  "were orthotopic.  The bladder mucosa was evaluated using both antegrade and retroflex views and this showed small area of erythema on bladder, possibly due to retroflexion of scope or possible pre-existing lesion. There were no stones.       Laboratory:   I reviewed all applicable laboratory and pathology data and went over findings with patient  Significant for     Lab Results   Component Value Date    CR 1.03 09/11/2019    CR 0.99 06/12/2019    CR 1.02 03/06/2019    CR 1.00 02/13/2019    CR 1.18 02/11/2019    CR 0.98 02/07/2019    CR 0.99 02/03/2019    CR 0.97 01/23/2019    CR 0.97 01/02/2019    CR 1.06 12/26/2018       Results for orders placed or performed during the hospital encounter of 12/26/18   UA with Microscopic   Result Value Ref Range    Color Urine Light Yellow     Appearance Urine Clear     Glucose Urine Negative NEG^Negative mg/dL    Bilirubin Urine Negative NEG^Negative    Ketones Urine Negative NEG^Negative mg/dL    Specific Gravity Urine 1.008 1.003 - 1.035    Blood Urine Trace (A) NEG^Negative    pH Urine 5.5 5.0 - 7.0 pH    Protein Albumin Urine Negative NEG^Negative mg/dL    Urobilinogen mg/dL Normal 0.0 - 2.0 mg/dL    Nitrite Urine Negative NEG^Negative    Leukocyte Esterase Urine Negative NEG^Negative    Source Catheterized Urine     WBC Urine 0 0 - 5 /HPF    RBC Urine 0 0 - 2 /HPF     Imaging:   I personally reviewed all applicable imaging and went over the below findings with patient.    3/1/2019- CT C/A/P with contrast - \"1. Bilateral pulmonary nodules measuring up to 4 mm in the right lower lobe, previously measuring 8 mm. No new or enlarging pulmonary nodules. 2. No convincing evidence for metastatic disease in the abdomen, pelvis and bones.\"     1/22/19 - CT C/A/P with contrast compared with prior PET/CT: \"1. New well-circumscribed soft tissue pulmonary nodules of the right lower lobe and left upper lobe. Findings could be infectious, inflammatory, or represent metastatic disease. " "Continued attention on follow-up recommended. Postoperative changes of right nephrectomy. No evidence for local recurrence in the present study.\"      Results for orders placed or performed in visit on 09/25/19   XR Six Foot Standing Extremities    Narrative    XR SIX FOOT STANDING EXTREMITIES, XR KNEE BILATERAL 3 VW 9/25/2019  10:14 AM     HISTORY:  Bilateral knee pain; Bilateral knee pain    COMPARISON: None.    FINDINGS: A line drawn from the top of the femoral head to the tibial  plafond on the right measures 70.4 cm. On the left 70.3 cm. The lower  extremity phalanx are symmetric.    A line drawn from the middle of the right femoral head to the middle  of the tibial plafond and on the right falls through the lateral  tibial spine.    A line drawn from the proximal aspect of the left femoral head to the  level of the left tibial plafond and falls through the medial tibial  spine.    There is moderately severe joint space narrowing medial femoral tibial  joint on the right.    There is mild to moderate joint space loss on the left involving the  medial femoral tibial joint space.    On the notch views standing mild to moderate narrowing on the left  medial femoral-tibial joint space with marked loss of joint space on  the right.    The views of the patellofemoral joint show ossific density in the left  suprapatellar bursa present representing prominent loose body. Severe  degenerative changes seen in both patellofemoral joints with total  loss of joint space laterally between the lateral patellar facet and  trochlea with lateral patellar tilt. On the right there is 1 cm of  lateral subluxation.      Impression    IMPRESSION:   1. Leg lengths similar.  2. Mechanical axis following through the tibial spines. On the right  the lateral tibial spine on the left medial tibial spine.  3. Severe joint space narrowing medial femoral tibial joint space on  the right best seen on the lateral standing film. Moderate on the " left  in the medial femoral tibial joint space. Marginal osteophytic spurs.  4. Severe degenerative changes bilaterally lateral patellofemoral  joint with lateral tilt and slight lateral subluxation on the right.  5. Ossific density suprapatellar bursa suggestive of loose body on the  left.    SHERRELL ESPINAL MD

## 2019-10-03 NOTE — NURSING NOTE
"Chief Complaint   Patient presents with     Cystoscopy     Bladder cancer       Blood pressure (!) 150/70, pulse 70, height 1.448 m (4' 9\"), weight 72.1 kg (159 lb), not currently breastfeeding. Body mass index is 34.41 kg/m .    Patient Active Problem List   Diagnosis     Esophageal reflux     Restless leg syndrome     heat intolerance     Goiter     Disorder of bone and cartilage     Other psoriasis     perirectal cyst     Malignant melanoma of skin of trunk, except scrotum (H)     Nontoxic multinodular goiter     Hyperlipidemia LDL goal <130     Hypertension goal BP (blood pressure) < 140/90     Urinary incontinence     Hip arthritis     Polymyalgia rheumatica (H)     High risk medication use     Shoulder pain     Impaired fasting blood sugar     Chronic bilateral low back pain without sciatica     Obesity, unspecified obesity severity, unspecified obesity type     Iron deficiency anemia, unspecified iron deficiency anemia type     NSTEMI (non-ST elevated myocardial infarction) (H)     Stress-induced cardiomyopathy     A-fib (H)     Anxiety     Chronic systolic heart failure (H)     Urothelial carcinoma (H)     Hyperthyroidism     Restless legs syndrome     CRESENCIO (acute kidney injury) (H)     Hydronephrosis     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)     Graves disease     Encounter for antineoplastic chemotherapy     Chemotherapy-induced neutropenia (H)     Primary localized osteoarthritis of right knee       Allergies   Allergen Reactions     Codeine        Current Outpatient Medications   Medication Sig Dispense Refill     acetaminophen (TYLENOL) 650 MG CR tablet Take 1 tablet (650 mg) by mouth every 8 hours as needed for pain 100 tablet 0     alendronate (FOSAMAX) 70 MG tablet TAKE 1 TABLET EVERY 7 DAYS AT LEAST 60 MIN BEFORE BREAKFAST AS DIRECTED \"SEE PACKAGE FOR ADDITIONAL INSTRUCTIONS\" 12 tablet 3     aspirin 81 MG tablet Take 81 mg by mouth every evening        Calcium Citrate-Vitamin D (CALCIUM + D " PO) Take 1 tablet by mouth 2 times daily        cycloSPORINE (RESTASIS) 0.05 % ophthalmic emulsion Place 1 drop into both eyes 2 times daily       ferrous sulfate 325 (65 Fe) MG TBEC EC tablet Take 325 mg by mouth daily       furosemide (LASIX) 20 MG tablet TAKE 1 TABLET (20 MG) BY MOUTH EVERY MORNING 90 tablet 0     irbesartan (AVAPRO) 300 MG tablet TAKE 1 TABLET EVERY DAY 90 tablet 3     lovastatin (MEVACOR) 40 MG tablet TAKE 1 TABLET AT BEDTIME (HYPERLIPIDEMIA LDL GOAL BELOW 130) 90 tablet 0     methimazole (TAPAZOLE) 5 MG tablet 10 mg alternating with 15 mg every other day 225 tablet 3     nitroGLYcerin (NITROSTAT) 0.4 MG sublingual tablet For chest pain place 1 tablet under the tongue every 5 minutes for 3 doses. If symptoms persist 5 minutes after 1st dose call 911. 25 tablet 3     Omega-3 Fatty Acids (FISH OIL) 500 MG CAPS Take 1 capsule by mouth 2 times daily        omeprazole (PRILOSEC) 20 MG DR capsule TAKE 1 CAPSULE EVERY DAY 90 capsule 2     polyethylene glycol (MIRALAX/GLYCOLAX) packet Take 17 g by mouth       Probiotic Product (PROBIOTIC ADVANCED PO) Take 1 capsule by mouth every morning        propranolol ER (INDERAL LA) 60 MG 24 hr capsule TAKE 1 CAPSULE EVERY DAY 90 capsule 1     rOPINIRole (REQUIP) 0.25 MG tablet 0.25 mg in the afternoon and 0.5 mg at night 270 tablet 1     senna-docusate (SENOKOT-S;PERICOLACE) 8.6-50 MG per tablet Take 1 tablet by mouth 2 times daily To prevent constipation 60 tablet 0     sertraline (ZOLOFT) 100 MG tablet TAKE 1 TABLET (100 MG) BY MOUTH EVERY MORNING 90 tablet 2       Social History     Tobacco Use     Smoking status: Never Smoker     Smokeless tobacco: Never Used   Substance Use Topics     Alcohol use: No     Alcohol/week: 0.0 standard drinks     Drug use: No       Invasive Procedure Safety Checklist:    Procedure: Cystoscopy    Action: Complete sections and checkboxes as appropriate.    Pre-procedure:  1. Patient ID Verified with 2 identifiers (Bettie and  or  N) : YES    2. Procedure and site verified with patient/designee (when able) : YES    3. Accurate consent documentation in medical record : YES    4. H&P (or appropriate assessment) documented in medical record : N/A  H&P must be up to 30 days prior to procedure an updated within 24 hours of                 Procedure as applicable.     5. Relevant diagnostic and radiology test results appropriately labeled and displayed as applicable : YES    6. Blood products, implants, devices, and/or special equipment available for the procedure as applicable : YES    7. Procedure site(s) marked with provider initials [Exclusions: none] : NO    8. Marking not required. Reason : Yes  Procedure does not require site marking    Time Out:     Time-Out performed immediately prior to starting procedure, including verbal and active participation of all team members addressing: YES    1. Correct patient identity.  2. Confirmed that the correct side and site are marked.  3. An accurate procedure to be done.  4. Agreement on the procedure to be done.  5. Correct patient position.  6. Relevant images and results are properly labeled and appropriately displayed.  7. The need to administer antibiotics or fluids for irrigation purposes during the procedure as applicable.  8. Safety precautions based on patient history or medication use.    During Procedure: Verification of correct person, site, and procedure occurs any time the responsibility for care of the patient is transferred to another member of the care team.    The following medication was given:     MEDICATION:  Lidocaine without epinephrine 2% jelly  ROUTE: Urethral  SITE: Urethra via the meatus  DOSE: 10 mL  LOT #: [id number]  : [employer]  EXPIRATION DATE: 6-2021  NDC#: [address]-20   Was there drug waste? No    Prior to med admin, verified patient identity using patient's name and date of birth.  Due to med administration, patient instructed  to remain in clinic for 15 minutes  afterwards, and to report any adverse reaction to me immediately.    Drug Amount Wasted:  None.  Vial/Syringe: Syringe      Yajaira Rios  10/3/2019  10:22 AM

## 2019-10-03 NOTE — LETTER
10/3/2019       RE: Dimple Oviedo  [address]  [facility] [address]     Dear Colleague,    Thank you for referring your patient, Dimple Oviedo, to the [facility] UROLOGY AND [facility] FOR PROSTATE AND UROLOGIC CANCERS at [facility]. Please see a copy of my visit note below.      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 Right nephroureterectomy on 2018.  She completed chemotherapy (gem/carbo). Here for bladder evaluation     Plan:  -Urine cytology and FISH.  -Depending upon results urine studies, will decide whether to biopsy small erythematous lesion seen in bladder during cystoscopy.   -Follow up in 6 months for cystoscopy.    Jen Reagan MD  PGY-1 Urology    Attestation:  This patient was seen and evaluated by me, with the house staff team or resident(s).  I have reviewed the note above and agree.      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 The patient is following wit Dr. Toledo of Hematology and Oncology. Per Dr. Toledo's note, on 18 Mr. Oviedo- Started adjuvant chemotherapy (carbo+gem) per POUT trial. Cycle 2 - 19. Cycle 3 - 19. Cycle 4 - 19.     Date of last abdominal and pelvis imaging: pre-op PET 18  Date of last chest imaging: pre-op PET 18  Any recurrence? N/A    Review of Systems:   Pertinent items are noted in HPI or as below, remainder of  "complete ROS is negative.      Cystoscopy:   After sterile preparation and draping of the patient,  a 16-Danish flexible cystoscope was introduced via the urethra.  It was passed without difficulty into the bladder.  The urethra was open without evidence of stricture.  The ureteral orifices were orthotopic.  The bladder mucosa was evaluated using both antegrade and retroflex views and this showed small area of erythema on bladder, possibly due to retroflexion of scope or possible pre-existing lesion. There were no stones.       Laboratory:   I reviewed all applicable laboratory and pathology data and went over findings with patient  Significant for     Lab Results   Component Value Date    CR 1.03 09/11/2019    CR 0.99 06/12/2019    CR 1.02 03/06/2019    CR 1.00 02/13/2019    CR 1.18 02/11/2019    CR 0.98 02/07/2019    CR 0.99 02/03/2019    CR 0.97 01/23/2019    CR 0.97 01/02/2019    CR 1.06 12/26/2018       Results for orders placed or performed during the hospital encounter of 12/26/18   UA with Microscopic   Result Value Ref Range    Color Urine Light Yellow     Appearance Urine Clear     Glucose Urine Negative NEG^Negative mg/dL    Bilirubin Urine Negative NEG^Negative    Ketones Urine Negative NEG^Negative mg/dL    Specific Gravity Urine 1.008 1.003 - 1.035    Blood Urine Trace (A) NEG^Negative    pH Urine 5.5 5.0 - 7.0 pH    Protein Albumin Urine Negative NEG^Negative mg/dL    Urobilinogen mg/dL Normal 0.0 - 2.0 mg/dL    Nitrite Urine Negative NEG^Negative    Leukocyte Esterase Urine Negative NEG^Negative    Source Catheterized Urine     WBC Urine 0 0 - 5 /HPF    RBC Urine 0 0 - 2 /HPF     Imaging:   I personally reviewed all applicable imaging and went over the below findings with patient.    3/1/2019- CT C/A/P with contrast - \"1. Bilateral pulmonary nodules measuring up to 4 mm in the right lower lobe, previously measuring 8 mm. No new or enlarging pulmonary nodules. 2. No convincing evidence for metastatic " "disease in the abdomen, pelvis and bones.\"     1/22/19 - CT C/A/P with contrast compared with prior PET/CT: \"1. New well-circumscribed soft tissue pulmonary nodules of the right lower lobe and left upper lobe. Findings could be infectious, inflammatory, or represent metastatic disease. Continued attention on follow-up recommended. Postoperative changes of right nephrectomy. No evidence for local recurrence in the present study.\"      Results for orders placed or performed in visit on 09/25/19   XR Six Foot Standing Extremities    Narrative    XR SIX FOOT STANDING EXTREMITIES, XR KNEE BILATERAL 3 VW 9/25/2019  10:14 AM     HISTORY:  Bilateral knee pain; Bilateral knee pain    COMPARISON: None.    FINDINGS: A line drawn from the top of the femoral head to the tibial  plafond on the right measures 70.4 cm. On the left 70.3 cm. The lower  extremity phalanx are symmetric.    A line drawn from the middle of the right femoral head to the middle  of the tibial plafond and on the right falls through the lateral  tibial spine.    A line drawn from the proximal aspect of the left femoral head to the  level of the left tibial plafond and falls through the medial tibial  spine.    There is moderately severe joint space narrowing medial femoral tibial  joint on the right.    There is mild to moderate joint space loss on the left involving the  medial femoral tibial joint space.    On the notch views standing mild to moderate narrowing on the left  medial femoral-tibial joint space with marked loss of joint space on  the right.    The views of the patellofemoral joint show ossific density in the left  suprapatellar bursa present representing prominent loose body. Severe  degenerative changes seen in both patellofemoral joints with total  loss of joint space laterally between the lateral patellar facet and  trochlea with lateral patellar tilt. On the right there is 1 cm of  lateral subluxation.      Impression    IMPRESSION:   1. " Leg lengths similar.  2. Mechanical axis following through the tibial spines. On the right  the lateral tibial spine on the left medial tibial spine.  3. Severe joint space narrowing medial femoral tibial joint space on  the right best seen on the lateral standing film. Moderate on the left  in the medial femoral tibial joint space. Marginal osteophytic spurs.  4. Severe degenerative changes bilaterally lateral patellofemoral  joint with lateral tilt and slight lateral subluxation on the right.  5. Ossific density suprapatellar bursa suggestive of loose body on the  left.    SHERRELL ESPINAL MD       Again, thank you for allowing me to participate in the care of your patient.      Sincerely,    Stuart King MD

## 2019-10-03 NOTE — PATIENT INSTRUCTIONS
Please schedule a cystoscopy with Dr. King in 6 months.       It was a pleasure meeting with you today.  Thank you for allowing me and my team the privilege of caring for you today.  YOU are the reason we are here, and I truly hope we provided you with the excellent service you deserve.  Please let us know if there is anything else we can do for you so that we can be sure you are leaving completely satisfied with your care experience.

## 2019-10-04 ENCOUNTER — OFFICE VISIT (OUTPATIENT)
Dept: FAMILY MEDICINE | Facility: CLINIC | Age: 84
End: 2019-10-04
Payer: MEDICARE

## 2019-10-04 VITALS
RESPIRATION RATE: 13 BRPM | SYSTOLIC BLOOD PRESSURE: 136 MMHG | TEMPERATURE: 98.2 F | WEIGHT: 156.5 LBS | DIASTOLIC BLOOD PRESSURE: 62 MMHG | HEART RATE: 47 BPM | BODY MASS INDEX: 33.76 KG/M2 | HEIGHT: 57 IN | OXYGEN SATURATION: 95 %

## 2019-10-04 DIAGNOSIS — I87.303 STASIS EDEMA OF BOTH LOWER EXTREMITIES: ICD-10-CM

## 2019-10-04 DIAGNOSIS — I10 ESSENTIAL HYPERTENSION WITH GOAL BLOOD PRESSURE LESS THAN 140/90: ICD-10-CM

## 2019-10-04 DIAGNOSIS — E78.5 HYPERLIPIDEMIA LDL GOAL <130: ICD-10-CM

## 2019-10-04 DIAGNOSIS — G89.29 CHRONIC PAIN OF RIGHT KNEE: ICD-10-CM

## 2019-10-04 DIAGNOSIS — M25.561 CHRONIC PAIN OF RIGHT KNEE: ICD-10-CM

## 2019-10-04 DIAGNOSIS — Z00.00 ENCOUNTER FOR MEDICARE ANNUAL WELLNESS EXAM: Primary | ICD-10-CM

## 2019-10-04 DIAGNOSIS — G25.81 RESTLESS LEGS SYNDROME: ICD-10-CM

## 2019-10-04 DIAGNOSIS — K21.9 GASTROESOPHAGEAL REFLUX DISEASE WITHOUT ESOPHAGITIS: ICD-10-CM

## 2019-10-04 LAB
ALBUMIN SERPL-MCNC: 3.8 G/DL (ref 3.4–5)
ALP SERPL-CCNC: 86 U/L (ref 40–150)
ALT SERPL W P-5'-P-CCNC: 25 U/L (ref 0–50)
ANION GAP SERPL CALCULATED.3IONS-SCNC: 7 MMOL/L (ref 3–14)
AST SERPL W P-5'-P-CCNC: 18 U/L (ref 0–45)
BILIRUB SERPL-MCNC: 0.4 MG/DL (ref 0.2–1.3)
BUN SERPL-MCNC: 19 MG/DL (ref 7–30)
CALCIUM SERPL-MCNC: 9.1 MG/DL (ref 8.5–10.1)
CHLORIDE SERPL-SCNC: 102 MMOL/L (ref 94–109)
CHOLEST SERPL-MCNC: 189 MG/DL
CO2 SERPL-SCNC: 29 MMOL/L (ref 20–32)
CREAT SERPL-MCNC: 0.96 MG/DL (ref 0.52–1.04)
ERYTHROCYTE [DISTWIDTH] IN BLOOD BY AUTOMATED COUNT: 12.3 % (ref 10–15)
FERRITIN SERPL-MCNC: 108 NG/ML (ref 8–252)
GFR SERPL CREATININE-BSD FRML MDRD: 54 ML/MIN/{1.73_M2}
GLUCOSE SERPL-MCNC: 84 MG/DL (ref 70–99)
HCT VFR BLD AUTO: 38 % (ref 35–47)
HDLC SERPL-MCNC: 65 MG/DL
HGB BLD-MCNC: 12.8 G/DL (ref 11.7–15.7)
LDLC SERPL CALC-MCNC: 105 MG/DL
MCH RBC QN AUTO: 34.1 PG (ref 26.5–33)
MCHC RBC AUTO-ENTMCNC: 33.7 G/DL (ref 31.5–36.5)
MCV RBC AUTO: 101 FL (ref 78–100)
NONHDLC SERPL-MCNC: 124 MG/DL
PLATELET # BLD AUTO: 200 10E9/L (ref 150–450)
POTASSIUM SERPL-SCNC: 4.2 MMOL/L (ref 3.4–5.3)
PROT SERPL-MCNC: 7.3 G/DL (ref 6.8–8.8)
RBC # BLD AUTO: 3.75 10E12/L (ref 3.8–5.2)
SODIUM SERPL-SCNC: 138 MMOL/L (ref 133–144)
TRIGL SERPL-MCNC: 95 MG/DL
WBC # BLD AUTO: 5.5 10E9/L (ref 4–11)

## 2019-10-04 PROCEDURE — 80061 LIPID PANEL: CPT | Performed by: FAMILY MEDICINE

## 2019-10-04 PROCEDURE — 82728 ASSAY OF FERRITIN: CPT | Performed by: FAMILY MEDICINE

## 2019-10-04 PROCEDURE — 80053 COMPREHEN METABOLIC PANEL: CPT | Performed by: FAMILY MEDICINE

## 2019-10-04 PROCEDURE — 90662 IIV NO PRSV INCREASED AG IM: CPT | Performed by: FAMILY MEDICINE

## 2019-10-04 PROCEDURE — G0439 PPPS, SUBSEQ VISIT: HCPCS | Performed by: FAMILY MEDICINE

## 2019-10-04 PROCEDURE — 36415 COLL VENOUS BLD VENIPUNCTURE: CPT | Performed by: FAMILY MEDICINE

## 2019-10-04 PROCEDURE — G0008 ADMIN INFLUENZA VIRUS VAC: HCPCS | Performed by: FAMILY MEDICINE

## 2019-10-04 PROCEDURE — 85027 COMPLETE CBC AUTOMATED: CPT | Performed by: FAMILY MEDICINE

## 2019-10-04 RX ORDER — LOVASTATIN 40 MG
TABLET ORAL
Qty: 90 TABLET | Refills: 3 | Status: SHIPPED | OUTPATIENT
Start: 2019-10-04 | End: 2019-10-04

## 2019-10-04 RX ORDER — ROPINIROLE 0.25 MG/1
TABLET, FILM COATED ORAL
Qty: 270 TABLET | Refills: 1 | Status: SHIPPED | OUTPATIENT
Start: 2019-10-04 | End: 2019-10-04

## 2019-10-04 RX ORDER — FUROSEMIDE 20 MG
20 TABLET ORAL EVERY MORNING
Qty: 90 TABLET | Refills: 3 | Status: SHIPPED | OUTPATIENT
Start: 2019-10-04 | End: 2019-10-04

## 2019-10-04 RX ORDER — AMOXICILLIN 250 MG
1 CAPSULE ORAL 2 TIMES DAILY
Qty: 60 TABLET | Refills: 0 | Status: SHIPPED | OUTPATIENT
Start: 2019-10-04 | End: 2020-01-10

## 2019-10-04 RX ORDER — IRBESARTAN 300 MG/1
TABLET ORAL
Qty: 90 TABLET | Refills: 3 | Status: SHIPPED | OUTPATIENT
Start: 2020-01-01 | End: 2019-10-04

## 2019-10-04 RX ORDER — ROPINIROLE 0.25 MG/1
TABLET, FILM COATED ORAL
Qty: 270 TABLET | Refills: 1 | Status: SHIPPED | OUTPATIENT
Start: 2019-10-04 | End: 2020-05-05

## 2019-10-04 RX ORDER — FUROSEMIDE 20 MG
20 TABLET ORAL EVERY MORNING
Qty: 90 TABLET | Refills: 3 | Status: SHIPPED | OUTPATIENT
Start: 2019-10-04 | End: 2020-10-13

## 2019-10-04 RX ORDER — LOVASTATIN 40 MG
TABLET ORAL
Qty: 90 TABLET | Refills: 3 | Status: SHIPPED | OUTPATIENT
Start: 2019-10-04 | End: 2020-08-18

## 2019-10-04 RX ORDER — IRBESARTAN 300 MG/1
TABLET ORAL
Qty: 90 TABLET | Refills: 3 | Status: SHIPPED | OUTPATIENT
Start: 2020-01-01 | End: 2021-01-18

## 2019-10-04 ASSESSMENT — MIFFLIN-ST. JEOR: SCORE: 1023.76

## 2019-10-04 NOTE — PATIENT INSTRUCTIONS
Patient Education   Personalized Prevention Plan  You are due for the preventive services outlined below.  Your care team is available to assist you in scheduling these services.  If you have already completed any of these items, please share that information with your care team to update in your medical record.  Health Maintenance Due   Topic Date Due     Heart Failure Action Plan  06/23/1933     Annual Wellness Visit  05/01/2016     Cholesterol Lab  12/14/2018     Flu Vaccine (1) 09/01/2019     Colonscopy  08/13/2019

## 2019-10-04 NOTE — PROGRESS NOTES
"  SUBJECTIVE:   Dimple Oviedo is a 86 year old female who presents for Preventive Visit.      Are you in the first 12 months of your Medicare Part B coverage?  Yes,  Visual Acuity:  Right Eye: 10/12.5   Left Eye: 10/10  Both Eyes: 10/10-with corrective lens    Physical Health:    In general, how would you rate your overall physical health? fair    Outside of work, how many days during the week do you exercise? none    Outside of work, approximately how many minutes a day do you exercise?not applicable    If you drink alcohol do you typically have >3 drinks per day or >7 drinks per week? No    Do you usually eat at least 4 servings of fruit and vegetables a day, include whole grains & fiber and avoid regularly eating high fat or \"junk\" foods? Yes    Do you have any problems taking medications regularly?  No    Do you have any side effects from medications? none    Needs assistance for the following daily activities: no assistance needed    Which of the following safety concerns are present in your home?  none identified     Hearing impairment: Yes, Difficulty following a conversation in a noisy restaurant or crowded room.    Difficult to understand a speaker at a public meeting or [employer] service.    Need to ask people to speak up or repeat themselves.    Difficulty understanding soft or whispered speech.    In the past 6 months, have you been bothered by leaking of urine? yes    Mental Health:    In general, how would you rate your overall mental or emotional health? good  PHQ-2 Score:   0    Do you feel safe in your environment? Yes    Do you have a Health Care Directive? Yes: Advance Directive has been received and scanned.    Additional concerns to address?  YES- want to be make sure that she is up to date with immunization     Injectable Influenza Immunization Documentation    1.  Is the person to be vaccinated sick today?   No    2. Does the person to be vaccinated have an allergy to a component   of the " vaccine?   No  Egg Allergy Algorithm Link    3. Has the person to be vaccinated ever had a serious reaction   to influenza vaccine in the past?   No    4. Has the person to be vaccinated ever had Guillain-Barré syndrome?   No    Form completed by Joel Zruita MA               Fall risk:  Fallen 2 or more times in the past year?: No  Any fall with injury in the past year?: No    Cognitive Screenin) Repeat 3 items (Leader, Season, Table)    2) Clock draw: NORMAL  3) 3 item recall: Recalls 3 objects  Results: 3 items recalled: COGNITIVE IMPAIRMENT LESS LIKELY    Mini-CogTM Copyright S [employer]. Licensed by the author for use in [facility]; reprinted with permission (soob@[facility]). All rights reserved.      Do you have sleep apnea, excessive snoring or daytime drowsiness?: no           Reviewed and updated as needed this visit by clinical staff         Reviewed and updated as needed this visit by Provider        Social History     Tobacco Use     Smoking status: Never Smoker     Smokeless tobacco: Never Used   Substance Use Topics     Alcohol use: No     Alcohol/week: 0.0 standard drinks                           Current providers sharing in care for this patient include:    Patient Care Team:  Pop Zapien MD as PCP - General (Family Practice)  Critsa Richards APRN CNP as PCP - Cardiology (Nurse Practitioner)  Vincenzo Tovar MD as MD (Family Medicine - Sports Medicine)  Candelaria Raymundo MD as MD (Gastroenterology)  Shavon Bustamante PA-C as Physician Assistant (Physician Assistant)  Kenna La MD as MD (Urology)  Cristiana Correa, RN as Registered Nurse (Urology)  Pop Zapien MD as Referring Physician (Family Practice)  Kiera Figueroa, ATUL as Registered Nurse (Urology)  Stuart King MD as MD (Urology)  Kiera Figureoa, RN as Registered Nurse (Urology)  Geovanna Fregoso PA as Physician Assistant (Urology)  Jaden Toledo MD as MD  "(Internal Medicine-Hematology & Oncology)  Rose Estrella, RN as Nurse Coordinator (Oncology)  Brenda Tijerina, ATUL as Specialty Care Coordinator (Hematology & Oncology)  Kenna La MD as MD (Urology)  Jaden Toledo MD as Referring Physician (Internal Medicine-Hematology & Oncology)  Summer Gaona MD as Assigned PCP    The following health maintenance items are reviewed in Epic and correct as of today:  Health Maintenance   Topic Date Due     HF ACTION PLAN  06/23/1933     MEDICARE ANNUAL WELLNESS VISIT  05/01/2016     LIPID  12/14/2018     INFLUENZA VACCINE (1) 09/01/2019     COLONOSCOPY  08/13/2019     FALL RISK ASSESSMENT  11/26/2019     PHQ-9  03/04/2020     BMP  03/11/2020     THERON ASSESSMENT  09/04/2020     ALT  09/11/2020     CBC  09/11/2020     ADVANCE CARE PLANNING  09/12/2023     DTAP/TDAP/TD IMMUNIZATION (4 - Td) 09/04/2029     DEPRESSION ACTION PLAN  Completed     PNEUMOCOCCAL IMMUNIZATION 65+ HIGH/HIGHEST RISK  Completed     ZOSTER IMMUNIZATION  Completed     IPV IMMUNIZATION  Aged Out     MENINGITIS IMMUNIZATION  Aged Out        Doing well.  Had a recent follow-up with urologist.  Planning to visit her daughter and grandkids during holidays.    ROS:  Constitutional, HEENT, cardiovascular, pulmonary, GI, , musculoskeletal, neuro, skin, endocrine and psych systems are negative, except as otherwise noted.    OBJECTIVE:   There were no vitals taken for this visit. Estimated body mass index is 34.41 kg/m  as calculated from the following:    Height as of 10/3/19: 1.448 m (4' 9\").    Weight as of 10/3/19: 72.1 kg (159 lb).  EXAM:   GENERAL: healthy, alert and no distress  EYES: Eyes grossly normal to inspection, PERRL and conjunctivae and sclerae normal  HENT: ear canals and TM's normal, nose and mouth without ulcers or lesions  NECK: no adenopathy, no asymmetry, masses, or scars and thyroid normal to palpation  RESP: lungs clear to auscultation - no rales, rhonchi or wheezes     CV: regular " rate and rhythm, normal S1 S2, no S3 or S4, no murmur, click or rub, no peripheral edema and peripheral pulses strong  ABDOMEN: soft, nontender, no hepatosplenomegaly, no masses and bowel sounds normal  MS: no gross musculoskeletal defects noted, mild nonpitting edema bilaterally.  SKIN: no suspicious lesions or rashes on visible parts  NEURO: Normal strength and tone, mentation intact and speech normal  PSYCH: mentation appears normal, affect normal/bright         ASSESSMENT / PLAN:   1. Encounter for Medicare annual wellness exam       2. Stasis edema of both lower extremities  Needing to use Lasix regularly.  Blood pressure stable.  - furosemide (LASIX) 20 MG tablet; Take 1 tablet (20 mg) by mouth every morning  Dispense: 90 tablet; Refill: 3    3. Essential hypertension with goal blood pressure less than 140/90     - irbesartan (AVAPRO) 300 MG tablet; TAKE 1 TABLET EVERY DAY  Dispense: 90 tablet; Refill: 3    4. Hyperlipidemia LDL goal <130     - lovastatin (MEVACOR) 40 MG tablet; TAKE 1 TABLET AT BEDTIME (HYPERLIPIDEMIA LDL GOAL BELOW 130)  Dispense: 90 tablet; Refill: 3  - Comprehensive metabolic panel  - Lipid panel reflex to direct LDL Fasting    5. Gastroesophageal reflux disease without esophagitis  Long-term PPI may not be most ideal but I think at this point it would be okay to continue the same.  - omeprazole (PRILOSEC) 20 MG DR capsule; TAKE 1 CAPSULE EVERY DAY  Dispense: 90 capsule; Refill: 3    6. Restless legs syndrome     - rOPINIRole (REQUIP) 0.25 MG tablet; 0.25 mg in the afternoon and 0.5 mg at night  Dispense: 270 tablet; Refill: 1  - CBC with platelets    7. Chronic pain of right knee  Has knee arthritis.  Need ferritin mostly for her restless leg syndrome but could not order it through Medicare.  - Ferritin    End of Life Planning:  Patient currently has an advanced directive: No.  I have verified the patient's ablity to prepare an advanced directive/make health care decisions.  Literature  "was provided to assist patient in preparing an advanced directive.    COUNSELING:  Reviewed preventive health counseling, as reflected in patient instructions  Special attention given to:       Regular exercise       Healthy diet/nutrition       Vision screening       Hearing screening       Dental care       Bladder control       Fall risk prevention    Estimated body mass index is 34.41 kg/m  as calculated from the following:    Height as of 10/3/19: 1.448 m (4' 9\").    Weight as of 10/3/19: 72.1 kg (159 lb).    Weight management plan: Discussed healthy diet and exercise guidelines     reports that she has never smoked. She has never used smokeless tobacco.      Appropriate preventive services were discussed with this patient, including applicable screening as appropriate for cardiovascular disease, diabetes, osteopenia/osteoporosis, and glaucoma.  As appropriate for age/gender, discussed screening for colorectal cancer, prostate cancer, breast cancer, and cervical cancer. Checklist reviewing preventive services available has been given to the patient.    Reviewed patients plan of care and provided an AVS. The Basic Care Plan (routine screening as documented in Health Maintenance) for Dimple meets the Care Plan requirement. This Care Plan has been established and reviewed with the Patient.    Counseling Resources:  ATP IV Guidelines  Pooled Cohorts Equation Calculator  Breast Cancer Risk Calculator  FRAX Risk Assessment  ICSI Preventive Guidelines  Dietary Guidelines for Americans, 2010  USDA's MyPlate  ASA Prophylaxis  Lung CA Screening    Pop Zapien MD, MD  [facility]  "

## 2019-10-14 LAB — COPATH REPORT: NORMAL

## 2019-10-22 RX ORDER — ACETAMINOPHEN 325 MG/1
975 TABLET ORAL ONCE
Status: CANCELLED | OUTPATIENT
Start: 2020-02-17

## 2019-10-22 RX ORDER — GABAPENTIN 100 MG/1
100 CAPSULE ORAL
Status: CANCELLED | OUTPATIENT
Start: 2020-02-17

## 2019-10-22 RX ORDER — CEFAZOLIN SODIUM 1 G/3ML
1 INJECTION, POWDER, FOR SOLUTION INTRAMUSCULAR; INTRAVENOUS SEE ADMIN INSTRUCTIONS
Status: CANCELLED | OUTPATIENT
Start: 2020-02-17

## 2019-10-22 RX ORDER — CEFAZOLIN SODIUM 2 G/100ML
2 INJECTION, SOLUTION INTRAVENOUS
Status: CANCELLED | OUTPATIENT
Start: 2020-02-17

## 2019-10-22 RX ORDER — CELECOXIB 200 MG/1
200 CAPSULE ORAL ONCE
Status: CANCELLED | OUTPATIENT
Start: 2020-02-17

## 2019-10-30 DIAGNOSIS — G47.00 INSOMNIA, UNSPECIFIED TYPE: ICD-10-CM

## 2019-10-30 DIAGNOSIS — N17.9 AKI (ACUTE KIDNEY INJURY) (H): Primary | ICD-10-CM

## 2019-10-30 NOTE — TELEPHONE ENCOUNTER
"Requested Prescriptions   Pending Prescriptions Disp Refills     traZODone (DESYREL) 50 MG tablet [Pharmacy Med Name: TRAZODONE HYDROCHLORIDE 50 MG Tablet] 45 tablet 1     Sig: TAKE 1/2 TABLET AT BEDTIME  Not on current med list  Last Office Visit: 10/4/2019   Future Office Visit:            Serotonin Modulators Failed - 10/30/2019  1:52 PM        Failed - Medication is active on med list        Passed - Recent (12 mo) or future (30 days) visit within the authorizing provider's specialty     Patient has had an office visit with the authorizing provider or a provider within the authorizing providers department within the previous 12 mos or has a future within next 30 days. See \"Patient Info\" tab in inbasket, or \"Choose Columns\" in Meds & Orders section of the refill encounter.            Passed - Patient is age 18 or older        Passed - No active pregnancy on record        Passed - No positive pregnancy test in past 12 months          "

## 2019-10-31 NOTE — TELEPHONE ENCOUNTER
As per previous phone messages patient is no longer taking trazodone.  Please contact patient and if she is still not using trazodone please contact pharmacy and notify them not to send us any more request.

## 2019-11-01 RX ORDER — TRAZODONE HYDROCHLORIDE 50 MG/1
TABLET, FILM COATED ORAL
Qty: 45 TABLET | Refills: 1 | Status: SHIPPED | OUTPATIENT
Start: 2019-11-01 | End: 2020-04-06

## 2019-11-01 NOTE — TELEPHONE ENCOUNTER
"Curry - Patient reports she is still using medication - using a 1/2 tab - every night - she said previously \"she didn't want to renew it at that time\"     Doesn't need a call back if provider orders      "

## 2019-11-20 NOTE — TELEPHONE ENCOUNTER
HPI: Ms. Oviedo is a 85 year old female with a past medical history including HTN, multinodular goiter, GERD, hyperthyroidism, history of atrial fibrillation with RVR, and recent stress cardiomyopathy with recovered EF.     She presented to [facility] with persistent chest pain and shortness of breath, and was found to have an elevated troponin. EKG negative for ischemic changes. Chest CT negative for PE, but showed bilateral pleural effusions. Echo showed normal LV size with mild LVH, EF 30-35%, and global akinesis with sparing of the basal segments. Coronary angiogram revealed no obstructive CAD.  Findings all suggested stress-induced cardiomyopathy, so she was discharged on BB, ARB, statin, and aspirin.     Next Admission was on 1/16/18 for palpitations and was found to be in afib with RVR in the setting of volume overload and ongoing hyperthyroidism (though free t4 was normal that admission).   She converted to sinus spontaneously. She was given a one-time dose of xarelto and developed significant hematuria so this was d/c'ed.      She is now s/p bladder mass bx which were negative for malignancy. An echo during that admission showed a normalized EF. In May 2018 she had a ziopatch that showed some SVTs. Most recently she presented to ED 7/16/18 with shortness of breath. She was treated with Zpack and albuterol for bronchitis.       PAST MEDICAL HISTORY:  Past Medical History:   Diagnosis Date     Calculus of kidney      Esophageal reflux      GERD (gastroesophageal reflux disease)      Hyperlipidemia LDL goal <130 5/9/2010     Malignant melanoma of skin of trunk, except scrotum (H)      Nonspecific abnormal finding     has living will 2004 -      Nontoxic multinodular goiter     no further eval /tx rec per pt     Osteopenia      Other psoriasis      Personal history of colonic polyps      PMR (polymyalgia rheumatica) (H)      Stress-induced cardiomyopathy      Undiagnosed cardiac murmurs      Unspecified  "constipation      Unspecified essential hypertension        CURRENT MEDICATIONS:  Current Outpatient Prescriptions   Medication Sig Dispense Refill     acetaminophen (TYLENOL) 650 MG CR tablet Take 650 mg by mouth as needed       alendronate (FOSAMAX) 70 MG tablet TAKE 1 TABLET EVERY 7 DAYS AT LEAST 60 MINUTES BEFORE BREAKFAST AS DIRECTED \"SEE PACKAGE FOR ADDITIONAL INSTRUCTIONS\" (Patient taking differently: TAKE 1 TABLET EVERY 7 DAYS AT LEAST 60 MINUTES BEFORE BREAKFAST AS DIRECTED \"SEE PACKAGE FOR ADDITIONAL INSTRUCTIONS\" TUESDAY) 12 tablet 0     aspirin 81 MG tablet Take 81 mg by mouth daily       Calcium Citrate-Vitamin D (CALCIUM + D PO) Take 1 tablet by mouth 2 times daily        colestipol (COLESTID) 1 g tablet Take 1 g by mouth 2 times daily TAKE OTHER MEDS 1 HOUR BEFORE OR 4 HOURS AFTER COLESID       cycloSPORINE (RESTASIS) 0.05 % ophthalmic emulsion Place 1 drop into both eyes 2 times daily       ferrous sulfate 325 (65 Fe) MG TBEC EC tablet Take 325 mg by mouth daily       furosemide (LASIX) 20 MG tablet Take 1 tablet (20 mg) by mouth every morning 90 tablet 1     irbesartan (AVAPRO) 300 MG tablet TAKE 1 TABLET EVERY DAY (Patient taking differently: Take 300 mg by mouth every morning TAKE 1 TABLET EVERY DAY) 90 tablet 2     lovastatin (MEVACOR) 40 MG tablet TAKE 1 TABLET AT BEDTIME (HYPERLIPIDEMIA LDL GOAL BELOW 130) 90 tablet 2     Melatonin 10 MG TABS tablet Take 10 mg by mouth as needed for sleep        methimazole (TAPAZOLE) 5 MG tablet Take 1 tablet (5 mg) by mouth daily (Patient taking differently: Take 5 mg by mouth every morning ) 90 tablet 1     nitroGLYcerin (NITROSTAT) 0.4 MG sublingual tablet For chest pain place 1 tablet under the tongue every 5 minutes for 3 doses. If symptoms persist 5 minutes after 1st dose call 911. 25 tablet 3     Omega-3 Fatty Acids (FISH OIL) 500 MG CAPS Take 1 capsule by mouth 2 times daily        omeprazole (PRILOSEC) 20 MG CR capsule Take 1 capsule (20 mg) by mouth " No. CARLOS screening performed.  STOP BANG Legend: 0-2 = LOW Risk; 3-4 = INTERMEDIATE Risk; 5-8 = HIGH Risk daily (Patient taking differently: Take 20 mg by mouth every morning ) 180 capsule 3     order for DME Equipment being ordered: Knee high compression for B LE 20-30mmHg, Velcro units for night time (consider hybrid sock as foot swelling is less than leg swelling), Donning device 1 each 2     polyethylene glycol 0.4%- propylene glycol 0.3% (SYSTANE) 0.4-0.3 % SOLN ophthalmic solution Place 1 drop into both eyes 2 times daily as needed for dry eyes        Probiotic Product (PROBIOTIC ADVANCED PO) Take 1 capsule by mouth every morning        propranolol (INDERAL LA) 60 MG 24 hr capsule Take 1 capsule (60 mg) by mouth daily (Patient taking differently: Take 60 mg by mouth every morning ) 90 capsule 1     rOPINIRole (REQUIP) 0.25 MG tablet TAKE 1 TABLET(0.25 MG) BY MOUTH EVERY NIGHT AS NEEDED 90 tablet 0     senna-docusate (SENOKOT-S;PERICOLACE) 8.6-50 MG per tablet Take 1-2 tablets by mouth 2 times daily To prevent constipation 45 tablet 0     sertraline (ZOLOFT) 100 MG tablet Take 1 tablet (100 mg) by mouth every morning 90 tablet 1     traZODone (DESYREL) 50 MG tablet TAKE 1/2 TABLET(25 MG)  AT BEDTIME 45 tablet 1       PAST SURGICAL HISTORY:  Past Surgical History:   Procedure Laterality Date     BIOPSY       C NONSPECIFIC PROCEDURE  2005    colonoscopy polyp repeat 2010     COLONOSCOPY  2014     COMBINED CYSTOSCOPY, INSERT STENT URETER(S) Bilateral 9/12/2018    Procedure: COMBINED CYSTOSCOPY, INSERT STENT URETER(S);  Cystoscopy Bilateral ureteral Stent Placement.;  Surgeon: Justin Henry MD;  Location: [facility] OR     COMBINED CYSTOSCOPY, RETROGRADES, URETEROSCOPY, INSERT STENT Bilateral 4/3/2018    Procedure: COMBINED CYSTOSCOPY, RETROGRADES, URETEROSCOPY, INSERT STENT;;  Surgeon: Stuart King MD;  Location: [facility] OR     COMBINED CYSTOSCOPY, RETROGRADES, URETEROSCOPY, INSERT STENT Bilateral 9/10/2018    Procedure: COMBINED CYSTOSCOPY, RETROGRADES, URETEROSCOPY, INSERT STENT;  Cystoscopy, Bilateral  Ureteroscopy, Bladder Biopsies, Retrogram Pyelograms, Ureteral Washings and brushings, cysview;  Surgeon: Stuart King MD;  Location: [facility] OR     CYSTOSCOPY, BIOPSY BLADDER INSTILL OPTICAL AGENT N/A 4/3/2018    Procedure: CYSTOSCOPY, BIOPSY BLADDER INSTILL OPTICAL AGENT;  Cystoscopy, Blue Light Cystoscopy, Bladder Biopsies, Bilateral Selective ureteral washings for Cytology, Bilateral Retrograde Pyelograms, Bilateral Ureteroscopy;  Surgeon: Stuart King MD;  Location: [facility] OR     CYSTOSCOPY, BIOPSY BLADDER, COMBINED N/A 2/19/2018    Procedure: COMBINED CYSTOSCOPY, BIOPSY BLADDER;  Cystoscopy, Bladder Biopsy;  Surgeon: Kenna La MD;  Location: [facility] OR     ENDOSCOPIC ULTRASOUND LOWER GASTROINTESTIONAL TRACT (GI) N/A 10/30/2015    Procedure: ENDOSCOPIC ULTRASOUND LOWER GASTROINTESTIONAL TRACT (GI);  Surgeon: Daniel Jean-Baptiste MD;  Location: [facility] OR     EYE SURGERY  12/4/17     LAPAROSCOPIC CHOLECYSTECTOMY WITH CHOLANGIOGRAMS N/A 11/1/2015    Procedure: LAPAROSCOPIC CHOLECYSTECTOMY WITH CHOLANGIOGRAMS;  Surgeon: Tonie Warren MD;  Location: [facility] OR     SURGICAL HISTORY OF -   1996    malignant melanoma     SURGICAL HISTORY OF -   1968    thyroid nodule     SURGICAL HISTORY OF -       D & C       ALLERGIES:     Allergies   Allergen Reactions     No Known Drug Allergies        FAMILY HISTORY:  Family History   Problem Relation Age of Onset     Cancer Father      dec - esophageal and laryngeal     HEART DISEASE Mother      Respiratory Mother      dec     Breast Cancer Daughter      Other Cancer Daughter      Thyroid Disease Daughter      Asthma Daughter      Hyperlipidemia Son      Diabetes Son        SOCIAL HISTORY:  Social History   Substance Use Topics     Smoking status: Never Smoker     Smokeless tobacco: Never Used     Alcohol use No      Comment: 6 times per year-one drink       ROS:   Constitutional: No fever, chills, or sweats. Weight stable.   ENT: No visual disturbance,  ear ache, epistaxis, sore throat.   Cardiovascular: As per HPI.   Respiratory: No cough, hemoptysis.    GI: No nausea, vomiting, hematemesis, melena, or hematochezia.   : No hematuria.   Integument: Negative.   Psychiatric: Negative.   Hematologic:  Easy bruising, no easy bleeding.  Neuro: Negative.   Endocrinology: No significant heat or cold intolerance   Musculoskeletal: No myalgia.    Exam:  There were no vitals taken for this visit.  GENERAL APPEARANCE: healthy, alert and no distress  HEENT: no icterus, no xanthelasmas, normal pupil size and reaction, normal palate, mucosa moist, no central cyanosis  NECK: no adenopathy, no asymmetry, masses, or scars, thyroid normal to palpation and no bruits, JVP not elevated  RESPIRATORY: lungs clear to auscultation - no rales, rhonchi or wheezes, no use of accessory muscles, no retractions, respirations are unlabored, normal respiratory rate  CARDIOVASCULAR: regular rhythm, normal S1 with physiologic split S2, no S3 or S4 and no murmur, click or rub, precordium quiet with normal PMI.  ABDOMEN: soft, non tender, without hepatosplenomegaly, no masses palpable, bowel sounds normal, aorta not enlarged by palpation, no abdominal bruits  EXTREMITIES: peripheral pulses normal, no edema, no bruits  NEURO: alert and oriented to person/place/time, normal speech, gait and affect  VASC: Radial, femoral, dorsalis pedis and posterior tibialis pulses are normal in volumes and symmetric bilaterally. No bruits are heard.  SKIN: no ecchymoses, no rashes    Labs:  CBC RESULTS:   Lab Results   Component Value Date    WBC 7.5 09/14/2018    RBC 3.31 (L) 09/14/2018    HGB 9.1 (L) 09/14/2018    HCT 29.3 (L) 09/14/2018    MCV 89 09/14/2018    MCH 27.5 09/14/2018    MCHC 31.1 (L) 09/14/2018    RDW 15.0 09/14/2018     09/14/2018       BMP RESULTS:  Lab Results   Component Value Date     09/19/2018    POTASSIUM 4.4 09/19/2018    CHLORIDE 103 09/19/2018    CO2 28 09/19/2018    ANIONGAP 7  09/19/2018     (H) 09/19/2018    BUN 17 09/19/2018    CR 0.70 09/19/2018    GFRESTIMATED 80 09/19/2018    GFRESTBLACK >90 09/19/2018    SHREYA 8.9 09/19/2018        INR RESULTS:  Lab Results   Component Value Date    INR 1.71 (H) 01/16/2018    INR 1.02 12/13/2017    INR 0.95 11/01/2015    INR 1.00 10/30/2015       Procedures:    Echocardiogram: 9/10/18:  Interpretation Summary  Left ventricular function, chamber size, wall motion, and wall thickness are  normal.The EF is 55-60%.  Right ventricular function, chamber size, wall motion, and thickness are  normal.  Severe left atrial enlargement is present.  Aortic valve poorly visualized but appears to have moderate calcification and  at least mild-moderate stenosis.  Mild to moderate aortic insufficiency is present.  IVC measures 2.47cm and collapses with inspiration. Estimated mean right  atrial pressure is 8 mmHg (mildly elevated).  No pericardial effusion is present.    Echocardiogram: 7/23/18:  Interpretation Summary  Limited evaluation given suboptimal image quality.     The left ventricular ejection fraction is visually estimated at 55-60 %. No  regional wall motion abnormalities are seen.  Global right ventricular function is normal.  There is severe left atrial enlargement.  Valves are not well seen, though there appears to be mild aortic  insufficiency.  No pericardial effusion is present.  This study was compared with the study from 3/14/18 . There has been no  change.    Coronary angiogram 12/13/17       Assessment and Plan:                I very much appreciated the opportunity to see and assess this patient in the clinic with Cardiovascular Fellow        I agree with the above note which summarizes my findings and current recommendations.      Please do not hesitate to contact my office if you have any questions or concerns.       Butch Griffith MD  Cardiac Arrhythmia Service  [facility]  [phone]      CC

## 2019-12-04 ENCOUNTER — ANCILLARY PROCEDURE (OUTPATIENT)
Dept: CT IMAGING | Facility: CLINIC | Age: 84
End: 2019-12-04
Attending: INTERNAL MEDICINE
Payer: MEDICARE

## 2019-12-04 ENCOUNTER — ONCOLOGY VISIT (OUTPATIENT)
Dept: ONCOLOGY | Facility: CLINIC | Age: 84
End: 2019-12-04
Attending: INTERNAL MEDICINE
Payer: MEDICARE

## 2019-12-04 ENCOUNTER — APPOINTMENT (OUTPATIENT)
Dept: LAB | Facility: CLINIC | Age: 84
End: 2019-12-04
Attending: INTERNAL MEDICINE
Payer: MEDICARE

## 2019-12-04 VITALS
OXYGEN SATURATION: 97 % | TEMPERATURE: 98 F | BODY MASS INDEX: 35.27 KG/M2 | DIASTOLIC BLOOD PRESSURE: 74 MMHG | WEIGHT: 163 LBS | HEART RATE: 63 BPM | RESPIRATION RATE: 16 BRPM | SYSTOLIC BLOOD PRESSURE: 141 MMHG

## 2019-12-04 DIAGNOSIS — C66.1 UROTHELIAL CARCINOMA OF RIGHT DISTAL URETER (H): ICD-10-CM

## 2019-12-04 LAB
ALBUMIN SERPL-MCNC: 3.5 G/DL (ref 3.4–5)
ALP SERPL-CCNC: 104 U/L (ref 40–150)
ALT SERPL W P-5'-P-CCNC: 21 U/L (ref 0–50)
ANION GAP SERPL CALCULATED.3IONS-SCNC: 3 MMOL/L (ref 3–14)
AST SERPL W P-5'-P-CCNC: 14 U/L (ref 0–45)
BASOPHILS # BLD AUTO: 0.1 10E9/L (ref 0–0.2)
BASOPHILS NFR BLD AUTO: 1.2 %
BILIRUB SERPL-MCNC: 0.4 MG/DL (ref 0.2–1.3)
BUN SERPL-MCNC: 28 MG/DL (ref 7–30)
CALCIUM SERPL-MCNC: 9.1 MG/DL (ref 8.5–10.1)
CHLORIDE SERPL-SCNC: 103 MMOL/L (ref 94–109)
CO2 SERPL-SCNC: 29 MMOL/L (ref 20–32)
CREAT SERPL-MCNC: 1.09 MG/DL (ref 0.52–1.04)
DIFFERENTIAL METHOD BLD: NORMAL
EOSINOPHIL # BLD AUTO: 0.3 10E9/L (ref 0–0.7)
EOSINOPHIL NFR BLD AUTO: 4 %
ERYTHROCYTE [DISTWIDTH] IN BLOOD BY AUTOMATED COUNT: 12.4 % (ref 10–15)
GFR SERPL CREATININE-BSD FRML MDRD: 46 ML/MIN/{1.73_M2}
GLUCOSE SERPL-MCNC: 110 MG/DL (ref 70–99)
HCT VFR BLD AUTO: 38.8 % (ref 35–47)
HGB BLD-MCNC: 12.6 G/DL (ref 11.7–15.7)
IMM GRANULOCYTES # BLD: 0 10E9/L (ref 0–0.4)
IMM GRANULOCYTES NFR BLD: 0.3 %
LYMPHOCYTES # BLD AUTO: 1.6 10E9/L (ref 0.8–5.3)
LYMPHOCYTES NFR BLD AUTO: 23 %
MCH RBC QN AUTO: 31.9 PG (ref 26.5–33)
MCHC RBC AUTO-ENTMCNC: 32.5 G/DL (ref 31.5–36.5)
MCV RBC AUTO: 98 FL (ref 78–100)
MONOCYTES # BLD AUTO: 0.7 10E9/L (ref 0–1.3)
MONOCYTES NFR BLD AUTO: 10.3 %
NEUTROPHILS # BLD AUTO: 4.2 10E9/L (ref 1.6–8.3)
NEUTROPHILS NFR BLD AUTO: 61.2 %
NRBC # BLD AUTO: 0 10*3/UL
NRBC BLD AUTO-RTO: 0 /100
PLATELET # BLD AUTO: 210 10E9/L (ref 150–450)
POTASSIUM SERPL-SCNC: 4.7 MMOL/L (ref 3.4–5.3)
PROT SERPL-MCNC: 7.3 G/DL (ref 6.8–8.8)
RBC # BLD AUTO: 3.95 10E12/L (ref 3.8–5.2)
SODIUM SERPL-SCNC: 135 MMOL/L (ref 133–144)
WBC # BLD AUTO: 6.8 10E9/L (ref 4–11)

## 2019-12-04 PROCEDURE — 36415 COLL VENOUS BLD VENIPUNCTURE: CPT

## 2019-12-04 PROCEDURE — 80053 COMPREHEN METABOLIC PANEL: CPT | Performed by: INTERNAL MEDICINE

## 2019-12-04 PROCEDURE — 85025 COMPLETE CBC W/AUTO DIFF WBC: CPT | Performed by: INTERNAL MEDICINE

## 2019-12-04 PROCEDURE — 99214 OFFICE O/P EST MOD 30 MIN: CPT | Mod: GC | Performed by: INTERNAL MEDICINE

## 2019-12-04 PROCEDURE — G0463 HOSPITAL OUTPT CLINIC VISIT: HCPCS | Mod: ZF

## 2019-12-04 ASSESSMENT — PAIN SCALES - GENERAL: PAINLEVEL: NO PAIN (0)

## 2019-12-04 NOTE — PROGRESS NOTES
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    HISTORY OF PRESENT ILLNESS:  Ms. 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     Ms. Oviedo is doing well overall. States she gained a few pounds, but is hoping to gain much more. She plays cards and tries to stay active. She has no neuropathy. She denies fever, chills, shortness of breath or chest pain. There is no clinical evidence of disease progression.    She follows with Dr. King in urology and last cystoscopy was done in October of this year. There was a small erythematous lesion in the bladder that was not biopsied. However, urine cytology from that time showed cells suspicious for malignancy. She isn't scheduled for a repeat cystoscopy until April of next year. She denies any hematuria, urinary urgency or frequency. She does have some issues with stress incontinence, but this is a long-standing issue and hasn't changed recently.    ONCOLOGIC HISTORY:  1. 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   - Jan 2018 to September 2018- Persistent gross hematuria and underwent multiple procedures.    - 2/19/18 and 4/3/18- cystoscopies with bladder biopsies  which were negative for bladder tumor  - 1/17/18, 3/8/18, 7/26/18, 9/10/18- Multiple urine cytologies have come back positive by FISH for high-grade transitional cell carcinoma  - 9/10/18- Underwent a bilateral cystoureteroscopy with biopsy and brushing that showed  right upper tract cytology suspicious for high-grade urothelial ca. Right ureter brushing negative from that day. Left upper tract negative.  - 9/10/18- CT  "A/P without contrast showed new small bilateral pleural effusions and interstitial pulmonary edema but no evidence of locoregional or metastatic disease.  - 9/12/18- Presented to ED with oliguria, CRESENCIO, and mild hydronephrosis bilaterally on CT scan and underwent bilateral ureteral stent placment  - 11/13/2018- Right robotic nephroureterectomy, excision of ana-caval mass and LN excision by Stuart King. Pathology showed \"Right nephroureterectomy: Invasive high grade urothelial carcinoma measuring 3.5 cm, located in renal pelvis and invading through renal parenchyma into perinephric fat. Urothelial carcinoma in-situ present. Margins free of tumor. Ana-caval mass: Metastatic urothelial carcinoma involving one lymph node with at least 1 cm tumor deposit. Pericaval Extranodal Extension present. Five additional benign pericaval lymph nodes. Pathologic stage: pT4N1 (1 of 6 lymph nodes).\"  - 12/11/18- PET/CT whole body demonstrated significant residual FDG avid disease. For example, \"there is an FDG avid ill-defined pericaval mass immediately medial to the surgical clips measuring approximately 2.0 x 1.4 cm, with max SUV measuring up to12.2, see series 4 image 21. Additional FDG avid retrocaval and interaortocaval lymphadenopathy are noted.There is a 1.2 cm nodule in the right hemipelvis posterior lateral tothe bladder with max SUV measuring 8.3, see series 4 image 77. The bladder is incompletely distended.\" No suspicious thoracic or bone uptake.  - 12/12/18- Started adjuvant chemotherapy (carbo+gem) per POUT trial. Cycle 2 - 1/2/19. Cycle 3 - 1/23/19. Cycle 4 - 02/13/19.  - 1/22/19 - CT C/A/P with contrast compared with prior PET/CT: \"1. New well-circumscribed soft tissue pulmonary nodules of the right lower lobe and left upper lobe. Findings could be infectious, inflammatory, or represent metastatic disease. Continued attention on follow-up recommended. Postoperative changes of right nephrectomy. No evidence for " "local recurrence in the present study.\"  - 3/1/2019- CT C/A/P with contrast - \"1. Bilateral pulmonary nodules measuring up to 4 mm in the right lower lobe, previously measuring 8 mm. No new or enlarging pulmonary nodules. 2. No convincing evidence for metastatic disease in the abdomen, pelvis and bones.\"  - 6/3/2019- CT C/A/P with contrast - \"1. Surgical changes of right nephrectomy without evidence of residual or recurrent disease on this noncontrast exam.  Consider contrast enhanced examination on follow-up. 2. No evidence of metastatic disease in the abdomen, or pelvis on noncontrast CT.  Scattered tiny pulmonary nodules in the chest are not significantly changed.  Previously described prominent right lower lobe pulmonary nodule (previously measuring up to 8 mm) is no longer visualized. 3. Stable bilateral pulmonary nodules measuring maximally 4 mm.\"  - 09/11/19: CT C/A/P without contrast - \"1. Stable exam without evidence convincing for new disease. 2. Stable pulmonary nodules. 3. 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 4. Stable heterogeneous enlargement of the thyroid with underlying nodules suggested.\"  - 10/3/19: Cystoscopy showed a small area of erythema on retroflexion, possibly pre-existing or due to retroflexion of the scope. Urine cytology from that time showed cells suspicious for malignancy.   - 12/4/19: CT C/A/P without contrast - \"No acute change since prior exam. No evidence of recurrent or metastatic disease. Tiny lung nodules are stable. Prior right nephrectomy. Left renal artery aneurysm is stable.\"    REVIEW OF SYSTEMS: 14 point ROS negative other than the symptoms noted above in the HPI.    PAST MEDICAL AND SURGICAL HISTORY:   Past Medical History:   Diagnosis Date     Calculus of kidney      Chemotherapy-induced neutropenia (H) 3/6/2019     Esophageal reflux      GERD (gastroesophageal reflux disease)      Hyperlipidemia LDL goal <130 5/9/2010     Malignant melanoma of skin " of trunk, except scrotum (H)      Nonspecific abnormal finding     has living will 2004 -      Nontoxic multinodular goiter     no further eval /tx rec per pt     Osteopenia      Other psoriasis      Personal history of colonic polyps      PMR (polymyalgia rheumatica) (H)      Stress-induced cardiomyopathy      Undiagnosed cardiac murmurs      Unspecified constipation      Unspecified essential hypertension      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     Past Surgical History:   Procedure Laterality Date     BIOPSY       C NONSPECIFIC PROCEDURE  2005    colonoscopy polyp repeat 2010     COLONOSCOPY  2014     COMBINED CYSTOSCOPY, INSERT STENT URETER(S) Bilateral 9/12/2018    Procedure: COMBINED CYSTOSCOPY, INSERT STENT URETER(S);  Cystoscopy Bilateral ureteral Stent Placement.;  Surgeon: Justin Henry MD;  [facility]     COMBINED CYSTOSCOPY, RETROGRADES, URETEROSCOPY, INSERT STENT Bilateral 4/3/2018    Procedure: COMBINED CYSTOSCOPY, RETROGRADES, URETEROSCOPY, INSERT STENT;;  Surgeon: Stuart King MD;  [facility]     COMBINED CYSTOSCOPY, RETROGRADES, URETEROSCOPY, INSERT STENT Bilateral 9/10/2018    Procedure: COMBINED CYSTOSCOPY, RETROGRADES, URETEROSCOPY, INSERT STENT;  Cystoscopy, Bilateral Ureteroscopy, Bladder Biopsies, Retrogram Pyelograms, Ureteral Washings and brushings, cysview;  Surgeon: Stuart King MD;  [facility]     CYSTOSCOPY, BIOPSY BLADDER INSTILL OPTICAL AGENT N/A 4/3/2018    Procedure: CYSTOSCOPY, BIOPSY BLADDER INSTILL OPTICAL AGENT;  Cystoscopy, Blue Light Cystoscopy, Bladder Biopsies, Bilateral Selective ureteral washings for Cytology, Bilateral Retrograde Pyelograms, Bilateral Ureteroscopy;  Surgeon: Stuart King MD;  [facility]     CYSTOSCOPY, BIOPSY BLADDER, COMBINED N/A 2/19/2018    Procedure: COMBINED CYSTOSCOPY, BIOPSY BLADDER;  Cystoscopy, Bladder Biopsy;  Surgeon: Kenna La MD;  [facility]     CYSTOSCOPY,  REMOVE STENT(S), COMBINED  11/13/2018    Procedure: Flexible Cystoscopy with Stent Removal;  Surgeon: Stuart King MD;  [facility]     DAVINCI LYMPHADENECTOMY N/A 11/13/2018    Procedure: Davinci Lymphadenectomy ;  Surgeon: Stuart King MD;  [facility]     DAVINCI NEPHROURETERECTOMY N/A 11/13/2018    Procedure: Right DaVinci Assisted Nephroureterectomy;  Surgeon: Stuart King MD;  [facility]     ENDOSCOPIC ULTRASOUND LOWER GASTROINTESTIONAL TRACT (GI) N/A 10/30/2015    Procedure: ENDOSCOPIC ULTRASOUND LOWER GASTROINTESTIONAL TRACT (GI);  Surgeon: Daniel Jean-Baptiste MD;  [facility]     EYE SURGERY  12/4/17     INSERT PORT VASCULAR ACCESS Right 12/19/2018    Procedure: Chest Port Placement - right;  Surgeon: Stuart Chavez PA-C;  [facility]     IR CHEST PORT PLACEMENT > 5 YRS OF AGE  12/19/2018     IR PORT REMOVAL RIGHT  6/26/2019     LAPAROSCOPIC CHOLECYSTECTOMY WITH CHOLANGIOGRAMS N/A 11/1/2015    Procedure: LAPAROSCOPIC CHOLECYSTECTOMY WITH CHOLANGIOGRAMS;  Surgeon: Tonie Warren MD;  [facility]     REMOVE PORT VASCULAR ACCESS Right 6/26/2019    Procedure: Right Port Removal;  Surgeon: Froilan Irizarry PA-C;  [facility]     SURGICAL HISTORY OF -   1996    malignant melanoma     SURGICAL HISTORY OF -   1968    thyroid nodule     SURGICAL HISTORY OF -       D & C     SOCIAL HISTORY:   Social History     Tobacco Use     Smoking status: Never Smoker     Smokeless tobacco: Never Used   Substance Use Topics     Alcohol use: No     Alcohol/week: 0.0 standard drinks     Drug use: No     FAMILY HISTORY:   Family History   Problem Relation Age of Onset     Cancer Father         dec - esophageal and laryngeal     Heart Disease Mother      Respiratory Mother         dec     Breast Cancer Daughter      Other Cancer Daughter      Thyroid Disease Daughter      Asthma Daughter      Hyperlipidemia Son      Diabetes Son   "    ALLERGIES:   Allergies   Allergen Reactions     Codeine      CURRENT MEDICATIONS:   Current Outpatient Medications:      acetaminophen (TYLENOL) 650 MG CR tablet, Take 1 tablet (650 mg) by mouth every 8 hours as needed for pain, Disp: 100 tablet, Rfl: 0     alendronate (FOSAMAX) 70 MG tablet, TAKE 1 TABLET EVERY 7 DAYS AT LEAST 60 MIN BEFORE BREAKFAST AS DIRECTED \"SEE PACKAGE FOR ADDITIONAL INSTRUCTIONS\", Disp: 12 tablet, Rfl: 3     aspirin 81 MG tablet, Take 81 mg by mouth every evening , Disp: , Rfl:      Calcium Citrate-Vitamin D (CALCIUM + D PO), Take 1 tablet by mouth 2 times daily , Disp: , Rfl:      cycloSPORINE (RESTASIS) 0.05 % ophthalmic emulsion, Place 1 drop into both eyes 2 times daily, Disp: , Rfl:      ferrous sulfate 325 (65 Fe) MG TBEC EC tablet, Take 325 mg by mouth daily, Disp: , Rfl:      furosemide (LASIX) 20 MG tablet, Take 1 tablet (20 mg) by mouth every morning, Disp: 90 tablet, Rfl: 3     [START ON 1/1/2020] irbesartan (AVAPRO) 300 MG tablet, TAKE 1 TABLET EVERY DAY, Disp: 90 tablet, Rfl: 3     lovastatin (MEVACOR) 40 MG tablet, TAKE 1 TABLET AT BEDTIME (HYPERLIPIDEMIA LDL GOAL BELOW 130), Disp: 90 tablet, Rfl: 3     methimazole (TAPAZOLE) 5 MG tablet, 10 mg alternating with 15 mg every other day, Disp: 225 tablet, Rfl: 3     nitroGLYcerin (NITROSTAT) 0.4 MG sublingual tablet, For chest pain place 1 tablet under the tongue every 5 minutes for 3 doses. If symptoms persist 5 minutes after 1st dose call 911., Disp: 25 tablet, Rfl: 3     Omega-3 Fatty Acids (FISH OIL) 500 MG CAPS, Take 1 capsule by mouth 2 times daily , Disp: , Rfl:      omeprazole (PRILOSEC) 20 MG DR capsule, TAKE 1 CAPSULE EVERY DAY, Disp: 90 capsule, Rfl: 3     polyethylene glycol (MIRALAX/GLYCOLAX) packet, Take 17 g by mouth, Disp: , Rfl:      Probiotic Product (PROBIOTIC ADVANCED PO), Take 1 capsule by mouth every morning , Disp: , Rfl:      propranolol ER (INDERAL LA) 60 MG 24 hr capsule, TAKE 1 CAPSULE EVERY DAY, " Disp: 90 capsule, Rfl: 1     rOPINIRole (REQUIP) 0.25 MG tablet, 0.25 mg in the afternoon and 0.5 mg at night, Disp: 270 tablet, Rfl: 1     senna-docusate (SENOKOT-S/PERICOLACE) 8.6-50 MG tablet, Take 1 tablet by mouth 2 times daily To prevent constipation, Disp: 60 tablet, Rfl: 0     sertraline (ZOLOFT) 100 MG tablet, TAKE 1 TABLET (100 MG) BY MOUTH EVERY MORNING, Disp: 90 tablet, Rfl: 2     traZODone (DESYREL) 50 MG tablet, TAKE 1/2 TABLET AT BEDTIME, Disp: 45 tablet, Rfl: 1    PHYSICAL EXAMINATION:  Vital signs: BP (!) 141/74 (BP Location: Right arm, Patient Position: Sitting, Cuff Size: Adult Regular)   Pulse 63   Temp 98  F (36.7  C) (Oral)   Resp 16   Wt 73.9 kg (163 lb)   SpO2 97%   BMI 35.27 kg/m    ECOG performance status of 1. Living independently at home.  GENERAL: Well-nourished healthy-appearing woman sitting in chair, no acute distress.   HEENT: No icterus, no pallor. Moist mucous membranes. Oropharynx is clear.   NECK: Supple, no JVD/LAD.  LUNGS: Clear to ausculation bilaterally, normal work of breathing.   CARDIOVASCULAR: Regular rate and rhythm, no murmurs, gallops or rubs.   ABDOMEN: Soft, nontender and nondistended, no palpable masses.  EXTREMITIES: No cyanosis, no clubbing, b/l LE edema improved (wearing compression stockings).  NEUROLOGIC: Alert and fully oriented, no focal deficits.  LYMPH NODE EXAM: No palpable adenopathy - cervical, axillary or inguinal.     LABORATORY DATA:   Lab Test 12/04/19  1419 10/04/19  1115 09/11/19  1242 06/12/19  1427   WBC 6.8 5.5 7.5 8.0   RBC 3.95 3.75* 3.68* 3.83   HGB 12.6 12.8 12.3 12.4   HCT 38.8 38.0 37.4 37.3   MCV 98 101* 102* 97   MCH 31.9 34.1* 33.4* 32.4   MCHC 32.5 33.7 32.9 33.2   RDW 12.4 12.3 12.4 13.1    200 229 203   NEUTROPHIL 61.2  --  62.0 70.5    138 133 133   POTASSIUM 4.7 4.2 4.4 4.5   CHLORIDE 103 102 100 98   CO2 29 29 24 28   ANIONGAP 3 7 9 6   * 84 90 89   BUN 28 19 24 28   CR 1.09* 0.96 1.03 0.99   SHREYA 9.1 9.1  "8.9 8.7   PROTTOTAL 7.3 7.3 7.4 7.1   ALBUMIN 3.5 3.8 3.6 3.5   BILITOTAL 0.4 0.4 0.3 0.4   ALKPHOS 104 86 98 98   AST 14 18 14 18   ALT 21 25 17 26     IMAGING STUDIES:  As above.    ASSESSMENT AND PLAN: Ms. Oviedo is a delightful 86-year-old lady with localized stage IV ([id number]) high-grade, muscle-invasive transitional cell carcinoma of right renal pelvis, status post right robotic nephroureterectomy and 4 cycles of adjuvant carbo/gem, here for follow-up.     1. Upper tract urothelial cancer:   - She completed 4 cycles of adjuvant carboplatin plus gemcitabine chemotherapy per POUT trial. Has recovered well overall.   - We reviewed the restaging CT scan results with her and her daughter at length today. There is no clear evidence of disease on the scan, which is an excellent finding. Lung findings will need to be monitored closely.  - She is now on active surveillance. We discussed what this involves, including close monitoring for new or worsening signs or symptoms such as shortness of breath, chest pain, abdominal pain, unexplained weight loss, hematuria etc.  - As part of active surveillance, she will continue following with medical oncology every 4 months for the next year, with restaging CT chest abdomen pelvis without contrast due to her renal function.   - Seen in urology clinic by Dr. King on 10/3/19 for surveillance cystoscopy. Per review of the notes from that procedure, a small erythematous region was seen on retroflexion of the scope. This area was not biopsied at that time, with plan to follow-up urine cytology from that day. Unfortunately, cytology showed cells \"suspicious for malignancy\". She is not scheduled for repeat cystoscopy until April of next year. Message was sent to Dr. King to confirm if repeat cysto is indicated at a shorter interval given the cytology findings. This was discussed with the patient and her daughter today as well.     2. Renal artery saccular aneursym:  - Management " per IR team.   3. Chemo induced anemia and thrombocytopenia:  - Resolved.   4. History of afib and palpitations:  - Pt encouraged to keep cardiology follow-ups.   Return to clinic in 4 months with restaging CT C/A/P.  All of the above was explained to the patient in lay language and several questions were answered. They are in agreement with the plan.  Patient was seen and discussed with Dr. Jaden Toledo.  Yvonne Del Toro MD/PhD  Heme/Onc Fellow    ATTENDING ATTESTATION NOTE:  I saw the patient with Dr. Del Toro, the heme/onc fellow and discussed all pertinent clinical data including lab, imaging and path reports. The plan above was made under my supervision and accurately reflects the A/P after my examination and discussion with the patient.     BILLIN. 50 mins spent in encounter including >50% face-to-face time.     Jaden Toledo M.D.  . Professor of Medicine  Genitourinary Oncology  Division of Hematology, Oncology & Transplantation  [facility]

## 2019-12-04 NOTE — NURSING NOTE
"Oncology Rooming Note    December 4, 2019 3:26 PM   Dimple Oviedo is a 86 year old female who presents for:    Chief Complaint   Patient presents with     Blood Draw     labs drawn via vpt by rn. vs taken      Oncology Clinic Visit     Return; Renal Ca     Initial Vitals: BP (!) 141/74 (BP Location: Right arm, Patient Position: Sitting, Cuff Size: Adult Regular)   Pulse 63   Temp 98  F (36.7  C) (Oral)   Resp 16   Wt 73.9 kg (163 lb)   SpO2 97%   BMI 35.27 kg/m   Estimated body mass index is 35.27 kg/m  as calculated from the following:    Height as of 10/4/19: 1.448 m (4' 9\").    Weight as of this encounter: 73.9 kg (163 lb). Body surface area is 1.72 meters squared.  No Pain (0) Comment: Data Unavailable   No LMP recorded. Patient is postmenopausal.  Allergies reviewed: Yes  Medications reviewed: Yes    Medications: Medication refills not needed today.  Pharmacy name entered into [facility]:    [id number] - [place], [place] - [address] AT [facility] & [address] [place] [address] [facility] #[id number] - [place], [place] - [address] AT [facility] [address]  [facility] - [facility] [address]    Clinical concerns: No new concerns        Krysta Davenport CMA              "

## 2019-12-04 NOTE — NURSING NOTE
Chief Complaint   Patient presents with     Blood Draw     labs drawn via vpt by rn. vs taken      Blood drawn via vpt by RN in lab. VS taken. Pt checked into next appointment.   Francine Thibodeaux RN

## 2019-12-04 NOTE — LETTER
12/4/2019       RE: Dimple Oviedo  [address]  [facility] [address]     Dear Colleague,    Thank you for referring your patient, Dimple Oviedo, to the [facility]. Please see a copy of my visit note below.    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    HISTORY OF PRESENT ILLNESS:  Ms. 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     Ms. Oviedo is doing well overall. States she gained a few pounds, but is hoping to gain much more. She plays cards and tries to stay active. She has no neuropathy. She denies fever, chills, shortness of breath or chest pain. There is no clinical evidence of disease progression.    She follows with Dr. King in urology and last cystoscopy was done in October of this year. There was a small erythematous lesion in the bladder that was not biopsied. However, urine cytology from that time showed cells suspicious for malignancy. She isn't scheduled for a repeat cystoscopy until April of next year. She denies any hematuria, urinary urgency or frequency. She does have some issues with stress incontinence, but this is a long-standing issue and hasn't changed recently.    ONCOLOGIC HISTORY:  1. 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   - Jan 2018 to September 2018- Persistent gross hematuria and underwent multiple procedures.    - 2/19/18 and 4/3/18- cystoscopies with bladder biopsies  which were negative for bladder tumor  - 1/17/18, 3/8/18, 7/26/18, 9/10/18- Multiple urine cytologies have come back positive by FISH for high-grade transitional cell  "carcinoma  - 9/10/18- Underwent a bilateral cystoureteroscopy with biopsy and brushing that showed  right upper tract cytology suspicious for high-grade urothelial ca. Right ureter brushing negative from that day. Left upper tract negative.  - 9/10/18- CT A/P without contrast showed new small bilateral pleural effusions and interstitial pulmonary edema but no evidence of locoregional or metastatic disease.  - 9/12/18- Presented to ED with oliguria, CRESENCIO, and mild hydronephrosis bilaterally on CT scan and underwent bilateral ureteral stent placment  - 11/13/2018- Right robotic nephroureterectomy, excision of ana-caval mass and LN excision by Stuart King. Pathology showed \"Right nephroureterectomy: Invasive high grade urothelial carcinoma measuring 3.5 cm, located in renal pelvis and invading through renal parenchyma into perinephric fat. Urothelial carcinoma in-situ present. Margins free of tumor. Ana-caval mass: Metastatic urothelial carcinoma involving one lymph node with at least 1 cm tumor deposit. Pericaval Extranodal Extension present. Five additional benign pericaval lymph nodes. Pathologic stage: pT4N1 (1 of 6 lymph nodes).\"  - 12/11/18- PET/CT whole body demonstrated significant residual FDG avid disease. For example, \"there is an FDG avid ill-defined pericaval mass immediately medial to the surgical clips measuring approximately 2.0 x 1.4 cm, with max SUV measuring up to12.2, see series 4 image 21. Additional FDG avid retrocaval and interaortocaval lymphadenopathy are noted.There is a 1.2 cm nodule in the right hemipelvis posterior lateral tothe bladder with max SUV measuring 8.3, see series 4 image 77. The bladder is incompletely distended.\" No suspicious thoracic or bone uptake.  - 12/12/18- Started adjuvant chemotherapy (carbo+gem) per POUT trial. Cycle 2 - 1/2/19. Cycle 3 - 1/23/19. Cycle 4 - 02/13/19.  - 1/22/19 - CT C/A/P with contrast compared with prior PET/CT: \"1. New well-circumscribed " "soft tissue pulmonary nodules of the right lower lobe and left upper lobe. Findings could be infectious, inflammatory, or represent metastatic disease. Continued attention on follow-up recommended. Postoperative changes of right nephrectomy. No evidence for local recurrence in the present study.\"  - 3/1/2019- CT C/A/P with contrast - \"1. Bilateral pulmonary nodules measuring up to 4 mm in the right lower lobe, previously measuring 8 mm. No new or enlarging pulmonary nodules. 2. No convincing evidence for metastatic disease in the abdomen, pelvis and bones.\"  - 6/3/2019- CT C/A/P with contrast - \"1. Surgical changes of right nephrectomy without evidence of residual or recurrent disease on this noncontrast exam.  Consider contrast enhanced examination on follow-up. 2. No evidence of metastatic disease in the abdomen, or pelvis on noncontrast CT.  Scattered tiny pulmonary nodules in the chest are not significantly changed.  Previously described prominent right lower lobe pulmonary nodule (previously measuring up to 8 mm) is no longer visualized. 3. Stable bilateral pulmonary nodules measuring maximally 4 mm.\"  - 09/11/19: CT C/A/P without contrast - \"1. Stable exam without evidence convincing for new disease. 2. Stable pulmonary nodules. 3. 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 4. Stable heterogeneous enlargement of the thyroid with underlying nodules suggested.\"  - 10/3/19: Cystoscopy showed a small area of erythema on retroflexion, possibly pre-existing or due to retroflexion of the scope. Urine cytology from that time showed cells suspicious for malignancy.   - 12/4/19: CT C/A/P without contrast - \"No acute change since prior exam. No evidence of recurrent or metastatic disease. Tiny lung nodules are stable. Prior right nephrectomy. Left renal artery aneurysm is stable.\"    REVIEW OF SYSTEMS: 14 point ROS negative other than the symptoms noted above in the HPI.    PAST MEDICAL AND SURGICAL " HISTORY:   Past Medical History:   Diagnosis Date     Calculus of kidney      Chemotherapy-induced neutropenia (H) 3/6/2019     Esophageal reflux      GERD (gastroesophageal reflux disease)      Hyperlipidemia LDL goal <130 5/9/2010     Malignant melanoma of skin of trunk, except scrotum (H)      Nonspecific abnormal finding     has living will 2004 -      Nontoxic multinodular goiter     no further eval /tx rec per pt     Osteopenia      Other psoriasis      Personal history of colonic polyps      PMR (polymyalgia rheumatica) (H)      Stress-induced cardiomyopathy      Undiagnosed cardiac murmurs      Unspecified constipation      Unspecified essential hypertension      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     Past Surgical History:   Procedure Laterality Date     BIOPSY       C NONSPECIFIC PROCEDURE  2005    colonoscopy polyp repeat 2010     COLONOSCOPY  2014     COMBINED CYSTOSCOPY, INSERT STENT URETER(S) Bilateral 9/12/2018    Procedure: COMBINED CYSTOSCOPY, INSERT STENT URETER(S);  Cystoscopy Bilateral ureteral Stent Placement.;  Surgeon: Justin Henry MD;  [facility]     COMBINED CYSTOSCOPY, RETROGRADES, URETEROSCOPY, INSERT STENT Bilateral 4/3/2018    Procedure: COMBINED CYSTOSCOPY, RETROGRADES, URETEROSCOPY, INSERT STENT;;  Surgeon: Stuart King MD;  [facility]     COMBINED CYSTOSCOPY, RETROGRADES, URETEROSCOPY, INSERT STENT Bilateral 9/10/2018    Procedure: COMBINED CYSTOSCOPY, RETROGRADES, URETEROSCOPY, INSERT STENT;  Cystoscopy, Bilateral Ureteroscopy, Bladder Biopsies, Retrogram Pyelograms, Ureteral Washings and brushings, cysview;  Surgeon: Stuart King MD;  [facility]     CYSTOSCOPY, BIOPSY BLADDER INSTILL OPTICAL AGENT N/A 4/3/2018    Procedure: CYSTOSCOPY, BIOPSY BLADDER INSTILL OPTICAL AGENT;  Cystoscopy, Blue Light Cystoscopy, Bladder Biopsies, Bilateral Selective ureteral washings for Cytology, Bilateral Retrograde Pyelograms, Bilateral Ureteroscopy;   Surgeon: Stuart King MD;  [facility]     CYSTOSCOPY, BIOPSY BLADDER, COMBINED N/A 2/19/2018    Procedure: COMBINED CYSTOSCOPY, BIOPSY BLADDER;  Cystoscopy, Bladder Biopsy;  Surgeon: Kenna aL MD;  [facility]: [facility]     CYSTOSCOPY, REMOVE STENT(S), COMBINED  11/13/2018    Procedure: Flexible Cystoscopy with Stent Removal;  Surgeon: Stuart King MD;  [facility]     DAVINCI LYMPHADENECTOMY N/A 11/13/2018    Procedure: Davinci Lymphadenectomy ;  Surgeon: Stuart King MD;  [facility]     DAVINCI NEPHROURETERECTOMY N/A 11/13/2018    Procedure: Right DaVinci Assisted Nephroureterectomy;  Surgeon: Stuart King MD;  [facility]     ENDOSCOPIC ULTRASOUND LOWER GASTROINTESTIONAL TRACT (GI) N/A 10/30/2015    Procedure: ENDOSCOPIC ULTRASOUND LOWER GASTROINTESTIONAL TRACT (GI);  Surgeon: Daniel Jean-Baptiste MD;  [facility]     EYE SURGERY  12/4/17     INSERT PORT VASCULAR ACCESS Right 12/19/2018    Procedure: Chest Port Placement - right;  Surgeon: Stuart Chavez PA-C;  [facility]     IR CHEST PORT PLACEMENT > 5 YRS OF AGE  12/19/2018     IR PORT REMOVAL RIGHT  6/26/2019     LAPAROSCOPIC CHOLECYSTECTOMY WITH CHOLANGIOGRAMS N/A 11/1/2015    Procedure: LAPAROSCOPIC CHOLECYSTECTOMY WITH CHOLANGIOGRAMS;  Surgeon: Tonie Warren MD;  [facility]     REMOVE PORT VASCULAR ACCESS Right 6/26/2019    Procedure: Right Port Removal;  Surgeon: Froilan Irizarry PA-C;  [facility]     SURGICAL HISTORY OF -   1996    malignant melanoma     SURGICAL HISTORY OF -   1968    thyroid nodule     SURGICAL HISTORY OF -       D & C     SOCIAL HISTORY:   Social History     Tobacco Use     Smoking status: Never Smoker     Smokeless tobacco: Never Used   Substance Use Topics     Alcohol use: No     Alcohol/week: 0.0 standard drinks     Drug use: No     FAMILY HISTORY:   Family History   Problem Relation Age of Onset     Cancer  "Father         dec - esophageal and laryngeal     Heart Disease Mother      Respiratory Mother         dec     Breast Cancer Daughter      Other Cancer Daughter      Thyroid Disease Daughter      Asthma Daughter      Hyperlipidemia Son      Diabetes Son      ALLERGIES:   Allergies   Allergen Reactions     Codeine      CURRENT MEDICATIONS:   Current Outpatient Medications:      acetaminophen (TYLENOL) 650 MG CR tablet, Take 1 tablet (650 mg) by mouth every 8 hours as needed for pain, Disp: 100 tablet, Rfl: 0     alendronate (FOSAMAX) 70 MG tablet, TAKE 1 TABLET EVERY 7 DAYS AT LEAST 60 MIN BEFORE BREAKFAST AS DIRECTED \"SEE PACKAGE FOR ADDITIONAL INSTRUCTIONS\", Disp: 12 tablet, Rfl: 3     aspirin 81 MG tablet, Take 81 mg by mouth every evening , Disp: , Rfl:      Calcium Citrate-Vitamin D (CALCIUM + D PO), Take 1 tablet by mouth 2 times daily , Disp: , Rfl:      cycloSPORINE (RESTASIS) 0.05 % ophthalmic emulsion, Place 1 drop into both eyes 2 times daily, Disp: , Rfl:      ferrous sulfate 325 (65 Fe) MG TBEC EC tablet, Take 325 mg by mouth daily, Disp: , Rfl:      furosemide (LASIX) 20 MG tablet, Take 1 tablet (20 mg) by mouth every morning, Disp: 90 tablet, Rfl: 3     [START ON 1/1/2020] irbesartan (AVAPRO) 300 MG tablet, TAKE 1 TABLET EVERY DAY, Disp: 90 tablet, Rfl: 3     lovastatin (MEVACOR) 40 MG tablet, TAKE 1 TABLET AT BEDTIME (HYPERLIPIDEMIA LDL GOAL BELOW 130), Disp: 90 tablet, Rfl: 3     methimazole (TAPAZOLE) 5 MG tablet, 10 mg alternating with 15 mg every other day, Disp: 225 tablet, Rfl: 3     nitroGLYcerin (NITROSTAT) 0.4 MG sublingual tablet, For chest pain place 1 tablet under the tongue every 5 minutes for 3 doses. If symptoms persist 5 minutes after 1st dose call 911., Disp: 25 tablet, Rfl: 3     Omega-3 Fatty Acids (FISH OIL) 500 MG CAPS, Take 1 capsule by mouth 2 times daily , Disp: , Rfl:      omeprazole (PRILOSEC) 20 MG DR capsule, TAKE 1 CAPSULE EVERY DAY, Disp: 90 capsule, Rfl: 3     " polyethylene glycol (MIRALAX/GLYCOLAX) packet, Take 17 g by mouth, Disp: , Rfl:      Probiotic Product (PROBIOTIC ADVANCED PO), Take 1 capsule by mouth every morning , Disp: , Rfl:      propranolol ER (INDERAL LA) 60 MG 24 hr capsule, TAKE 1 CAPSULE EVERY DAY, Disp: 90 capsule, Rfl: 1     rOPINIRole (REQUIP) 0.25 MG tablet, 0.25 mg in the afternoon and 0.5 mg at night, Disp: 270 tablet, Rfl: 1     senna-docusate (SENOKOT-S/PERICOLACE) 8.6-50 MG tablet, Take 1 tablet by mouth 2 times daily To prevent constipation, Disp: 60 tablet, Rfl: 0     sertraline (ZOLOFT) 100 MG tablet, TAKE 1 TABLET (100 MG) BY MOUTH EVERY MORNING, Disp: 90 tablet, Rfl: 2     traZODone (DESYREL) 50 MG tablet, TAKE 1/2 TABLET AT BEDTIME, Disp: 45 tablet, Rfl: 1    PHYSICAL EXAMINATION:  Vital signs: BP (!) 141/74 (BP Location: Right arm, Patient Position: Sitting, Cuff Size: Adult Regular)   Pulse 63   Temp 98  F (36.7  C) (Oral)   Resp 16   Wt 73.9 kg (163 lb)   SpO2 97%   BMI 35.27 kg/m     ECOG performance status of 1. Living independently at home.  GENERAL: Well-nourished healthy-appearing woman sitting in chair, no acute distress.   HEENT: No icterus, no pallor. Moist mucous membranes. Oropharynx is clear.   NECK: Supple, no JVD/LAD.  LUNGS: Clear to ausculation bilaterally, normal work of breathing.   CARDIOVASCULAR: Regular rate and rhythm, no murmurs, gallops or rubs.   ABDOMEN: Soft, nontender and nondistended, no palpable masses.  EXTREMITIES: No cyanosis, no clubbing, b/l LE edema improved (wearing compression stockings).  NEUROLOGIC: Alert and fully oriented, no focal deficits.  LYMPH NODE EXAM: No palpable adenopathy - cervical, axillary or inguinal.     LABORATORY DATA:   Lab Test 12/04/19  1419 10/04/19  1115 09/11/19  1242 06/12/19  1427   WBC 6.8 5.5 7.5 8.0   RBC 3.95 3.75* 3.68* 3.83   HGB 12.6 12.8 12.3 12.4   HCT 38.8 38.0 37.4 37.3   MCV 98 101* 102* 97   MCH 31.9 34.1* 33.4* 32.4   MCHC 32.5 33.7 32.9 33.2   RDW  "12.4 12.3 12.4 13.1    200 229 203   NEUTROPHIL 61.2  --  62.0 70.5    138 133 133   POTASSIUM 4.7 4.2 4.4 4.5   CHLORIDE 103 102 100 98   CO2 29 29 24 28   ANIONGAP 3 7 9 6   * 84 90 89   BUN 28 19 24 28   CR 1.09* 0.96 1.03 0.99   SHREYA 9.1 9.1 8.9 8.7   PROTTOTAL 7.3 7.3 7.4 7.1   ALBUMIN 3.5 3.8 3.6 3.5   BILITOTAL 0.4 0.4 0.3 0.4   ALKPHOS 104 86 98 98   AST 14 18 14 18   ALT 21 25 17 26     IMAGING STUDIES:  As above.    ASSESSMENT AND PLAN: Ms. Oviedo is a delightful 86-year-old lady with localized stage IV ([id number]) high-grade, muscle-invasive transitional cell carcinoma of right renal pelvis, status post right robotic nephroureterectomy and 4 cycles of adjuvant carbo/gem, here for follow-up.     1. Upper tract urothelial cancer:   - She completed 4 cycles of adjuvant carboplatin plus gemcitabine chemotherapy per POUT trial. Has recovered well overall.   - We reviewed the restaging CT scan results with her and her daughter at length today. There is no clear evidence of disease on the scan, which is an excellent finding. Lung findings will need to be monitored closely.  - She is now on active surveillance. We discussed what this involves, including close monitoring for new or worsening signs or symptoms such as shortness of breath, chest pain, abdominal pain, unexplained weight loss, hematuria etc.  - As part of active surveillance, she will continue following with medical oncology every 4 months for the next year, with restaging CT chest abdomen pelvis without contrast due to her renal function.   - Seen  in urology clinic by Dr. King on 10/3/19 for surveillance cystoscopy. Per review of the notes from that procedure, a small erythematous region was seen on retroflexion of the scope. This area was not biopsied at that time, with plan to follow-up urine cytology from that day. Unfortunately, cytology showed cells \"suspicious for malignancy\". She is not scheduled for repeat cystoscopy until " April of next year. Message was sent to Dr. King to confirm if repeat cysto is indicated at a shorter interval given the cytology findings. This was discussed with the patient and her daughter today as well.     2. Renal artery saccular aneursym:  - Management per IR team.   3. Chemo induced anemia and thrombocytopenia:  - Resolved.   4. History of afib and palpitations:  - Pt encouraged to keep cardiology follow-ups.   Return to clinic in 4 months with restaging CT C/A/P.  All of the above was explained to the patient in lay language and several questions were answered. They are in agreement with the plan.  Patient was seen and discussed with Dr. Jaden Toledo.  Yvonne Del Toro MD/PhD  Heme/Onc Fellow  ATTENDING ATTESTATION NOTE:  I saw the patient with Dr. Del Toro, the heme/onc fellow and discussed all pertinent clinical data including lab, imaging and path reports. The plan above was made under my supervision and accurately reflects the A/P after my examination and discussion with the patient.     BILLIN. 50 mins spent in encounter including >50% face-to-face time.     Jaden Toledo M.D.  [employer]. Professor of Medicine  Genitourinary Oncology  Division of Hematology, Oncology & Transplantation  [facility]

## 2019-12-07 ENCOUNTER — PREP FOR PROCEDURE (OUTPATIENT)
Dept: UROLOGY | Facility: CLINIC | Age: 84
End: 2019-12-07

## 2019-12-07 DIAGNOSIS — C68.9 UROTHELIAL CANCER (H): Primary | ICD-10-CM

## 2019-12-07 RX ORDER — CEFAZOLIN SODIUM 2 G/50ML
2 SOLUTION INTRAVENOUS
Status: CANCELLED | OUTPATIENT
Start: 2019-12-07

## 2019-12-07 RX ORDER — CEFAZOLIN SODIUM 1 G/50ML
1 INJECTION, SOLUTION INTRAVENOUS SEE ADMIN INSTRUCTIONS
Status: CANCELLED | OUTPATIENT
Start: 2019-12-07

## 2019-12-12 ENCOUNTER — MYC MEDICAL ADVICE (OUTPATIENT)
Dept: UROLOGY | Facility: CLINIC | Age: 84
End: 2019-12-12

## 2019-12-17 ENCOUNTER — TELEPHONE (OUTPATIENT)
Dept: UROLOGY | Facility: CLINIC | Age: 84
End: 2019-12-17

## 2019-12-17 PROBLEM — C68.9 UROTHELIAL CANCER (H): Status: ACTIVE | Noted: 2019-12-17

## 2019-12-17 NOTE — TELEPHONE ENCOUNTER
Patient is scheduled for surgery with Dr. King      Spoke or left message with: Day    Date of Surgery: 1/13/20    [facility]    Informed patient they will need an adult  yes    Pre-op with surgeon (if applicable): n/a    H&P: Scheduled with PAC 1/10/20    Additional imaging/appointments: n/a    Surgery packet: mailed 12/18/19     Additional comments: n/a

## 2019-12-18 NOTE — TELEPHONE ENCOUNTER
FUTURE VISIT INFORMATION      SURGERY INFORMATION:    Date: 20    Location: [facility] OR    Surgeon:  Edward Otero    Anesthesia Type:  Spinal    RECORDS REQUESTED FROM:       Primary Care Provider: Pop Martin    Pertinent Medical History: hypertension, stress- induced cardiomyopathy, chronic systolic heart failure    Most recent EKG+ Tracin/3/19    Most recent ECHO: 9/10/18    Most recent Coronary Angiogram: 17

## 2020-01-07 ENCOUNTER — PATIENT OUTREACH (OUTPATIENT)
Dept: UROLOGY | Facility: CLINIC | Age: 85
End: 2020-01-07

## 2020-01-07 DIAGNOSIS — C68.9 UROTHELIAL CANCER (H): Primary | ICD-10-CM

## 2020-01-10 ENCOUNTER — PRE VISIT (OUTPATIENT)
Dept: SURGERY | Facility: CLINIC | Age: 85
End: 2020-01-10

## 2020-01-10 ENCOUNTER — OFFICE VISIT (OUTPATIENT)
Dept: SURGERY | Facility: CLINIC | Age: 85
End: 2020-01-10
Payer: MEDICARE

## 2020-01-10 ENCOUNTER — ANESTHESIA EVENT (OUTPATIENT)
Dept: SURGERY | Facility: AMBULATORY SURGERY CENTER | Age: 85
End: 2020-01-10

## 2020-01-10 VITALS
BODY MASS INDEX: 34.15 KG/M2 | HEIGHT: 58 IN | HEART RATE: 60 BPM | DIASTOLIC BLOOD PRESSURE: 47 MMHG | TEMPERATURE: 98.9 F | RESPIRATION RATE: 15 BRPM | SYSTOLIC BLOOD PRESSURE: 115 MMHG | OXYGEN SATURATION: 97 % | WEIGHT: 162.7 LBS

## 2020-01-10 DIAGNOSIS — Z01.818 PREOP EXAMINATION: ICD-10-CM

## 2020-01-10 DIAGNOSIS — C68.9 UROTHELIAL CANCER (H): ICD-10-CM

## 2020-01-10 DIAGNOSIS — C67.9 MALIGNANT NEOPLASM OF URINARY BLADDER, UNSPECIFIED SITE (H): ICD-10-CM

## 2020-01-10 DIAGNOSIS — C68.9 UROTHELIAL CANCER (H): Primary | ICD-10-CM

## 2020-01-10 DIAGNOSIS — C66.1 UROTHELIAL CARCINOMA OF RIGHT DISTAL URETER (H): ICD-10-CM

## 2020-01-10 LAB
ALBUMIN SERPL-MCNC: 3.6 G/DL (ref 3.4–5)
ALBUMIN UR-MCNC: NEGATIVE MG/DL
ALP SERPL-CCNC: 112 U/L (ref 40–150)
ALT SERPL W P-5'-P-CCNC: 26 U/L (ref 0–50)
ANION GAP SERPL CALCULATED.3IONS-SCNC: 3 MMOL/L (ref 3–14)
APPEARANCE UR: CLEAR
AST SERPL W P-5'-P-CCNC: 15 U/L (ref 0–45)
BILIRUB SERPL-MCNC: 0.4 MG/DL (ref 0.2–1.3)
BILIRUB UR QL STRIP: NEGATIVE
BUN SERPL-MCNC: 34 MG/DL (ref 7–30)
CALCIUM SERPL-MCNC: 9.3 MG/DL (ref 8.5–10.1)
CHLORIDE SERPL-SCNC: 101 MMOL/L (ref 94–109)
CO2 SERPL-SCNC: 31 MMOL/L (ref 20–32)
COLOR UR AUTO: ABNORMAL
CREAT SERPL-MCNC: 1.19 MG/DL (ref 0.52–1.04)
ERYTHROCYTE [DISTWIDTH] IN BLOOD BY AUTOMATED COUNT: 12.6 % (ref 10–15)
GFR SERPL CREATININE-BSD FRML MDRD: 41 ML/MIN/{1.73_M2}
GLUCOSE SERPL-MCNC: 83 MG/DL (ref 70–99)
GLUCOSE UR STRIP-MCNC: NEGATIVE MG/DL
HCT VFR BLD AUTO: 41 % (ref 35–47)
HGB BLD-MCNC: 13.2 G/DL (ref 11.7–15.7)
HGB UR QL STRIP: ABNORMAL
KETONES UR STRIP-MCNC: NEGATIVE MG/DL
LEUKOCYTE ESTERASE UR QL STRIP: NEGATIVE
MCH RBC QN AUTO: 32 PG (ref 26.5–33)
MCHC RBC AUTO-ENTMCNC: 32.2 G/DL (ref 31.5–36.5)
MCV RBC AUTO: 99 FL (ref 78–100)
MUCOUS THREADS #/AREA URNS LPF: PRESENT /LPF
NITRATE UR QL: NEGATIVE
NT-PROBNP SERPL-MCNC: 510 PG/ML (ref 0–450)
PH UR STRIP: 5 PH (ref 5–7)
PLATELET # BLD AUTO: 263 10E9/L (ref 150–450)
POTASSIUM SERPL-SCNC: 4.2 MMOL/L (ref 3.4–5.3)
PROT SERPL-MCNC: 7.8 G/DL (ref 6.8–8.8)
RBC # BLD AUTO: 4.13 10E12/L (ref 3.8–5.2)
RBC #/AREA URNS AUTO: 2 /HPF (ref 0–2)
SODIUM SERPL-SCNC: 135 MMOL/L (ref 133–144)
SOURCE: ABNORMAL
SP GR UR STRIP: 1.01 (ref 1–1.03)
SQUAMOUS #/AREA URNS AUTO: <1 /HPF (ref 0–1)
UROBILINOGEN UR STRIP-MCNC: 0 MG/DL (ref 0–2)
WBC # BLD AUTO: 7.7 10E9/L (ref 4–11)
WBC #/AREA URNS AUTO: 2 /HPF (ref 0–5)

## 2020-01-10 PROCEDURE — 88120 CYTP URNE 3-5 PROBES EA SPEC: CPT | Performed by: UROLOGY

## 2020-01-10 PROCEDURE — 88112 CYTOPATH CELL ENHANCE TECH: CPT | Performed by: UROLOGY

## 2020-01-10 ASSESSMENT — ENCOUNTER SYMPTOMS: DYSRHYTHMIAS: 1

## 2020-01-10 ASSESSMENT — MIFFLIN-ST. JEOR: SCORE: 1063.78

## 2020-01-10 ASSESSMENT — LIFESTYLE VARIABLES: TOBACCO_USE: 0

## 2020-01-10 ASSESSMENT — PAIN SCALES - GENERAL: PAINLEVEL: NO PAIN (0)

## 2020-01-10 NOTE — ANESTHESIA PREPROCEDURE EVALUATION
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  CYSTOSCOPY, WITH RETROGRADE PYELOGRAM     Past Medical History:   Diagnosis Date     Calculus of kidney      Chemotherapy-induced neutropenia (H) 3/6/2019     Esophageal reflux      GERD (gastroesophageal reflux disease)      Hyperlipidemia LDL goal <130 2010     Malignant melanoma of skin of trunk, except scrotum (H)      Nonspecific abnormal finding     has living will  -      Nontoxic multinodular goiter     no further eval /tx rec per pt     Osteopenia      Other psoriasis      Personal history of colonic polyps      PMR (polymyalgia rheumatica) (H)      Stress-induced cardiomyopathy      Undiagnosed cardiac murmurs      Unspecified constipation      Unspecified essential hypertension      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      Past Surgical History:   Procedure Laterality Date     BIOPSY       C NONSPECIFIC PROCEDURE      colonoscopy polyp repeat      COLONOSCOPY       COMBINED CYSTOSCOPY, INSERT STENT URETER(S) Bilateral 2018    Procedure: COMBINED CYSTOSCOPY, INSERT STENT URETER(S);  Cystoscopy Bilateral ureteral Stent Placement.;  Surgeon: Justin Henry MD;  Location: [facility]     COMBINED CYSTOSCOPY, RETROGRADES, URETEROSCOPY, INSERT STENT Bilateral 4/3/2018    Procedure: COMBINED CYSTOSCOPY, RETROGRADES, URETEROSCOPY, INSERT STENT;;  Surgeon: Stuart King MD;  Location: [facility]     COMBINED CYSTOSCOPY, RETROGRADES, URETEROSCOPY, INSERT STENT Bilateral 9/10/2018    Procedure: COMBINED CYSTOSCOPY, RETROGRADES, URETEROSCOPY, INSERT STENT;  Cystoscopy, Bilateral Ureteroscopy, Bladder Biopsies, Retrogram Pyelograms, Ureteral Washings and brushings,  cysview;  Surgeon: Stuart King MD;  [facility]     CYSTOSCOPY, BIOPSY BLADDER INSTILL OPTICAL AGENT N/A 4/3/2018    Procedure: CYSTOSCOPY, BIOPSY BLADDER INSTILL OPTICAL AGENT;  Cystoscopy, Blue Light Cystoscopy, Bladder Biopsies, Bilateral Selective ureteral washings for Cytology, Bilateral Retrograde Pyelograms, Bilateral Ureteroscopy;  Surgeon: Stuart King MD;  [facility]     CYSTOSCOPY, BIOPSY BLADDER, COMBINED N/A 2/19/2018    Procedure: COMBINED CYSTOSCOPY, BIOPSY BLADDER;  Cystoscopy, Bladder Biopsy;  Surgeon: Kenna La MD;  [facility]     CYSTOSCOPY, REMOVE STENT(S), COMBINED  11/13/2018    Procedure: Flexible Cystoscopy with Stent Removal;  Surgeon: Stuart King MD;  [facility]: [facility]     DAVINCI LYMPHADENECTOMY N/A 11/13/2018    Procedure: Davinci Lymphadenectomy ;  Surgeon: Stuart King MD;  [facility]: [facility]     DAVINCI NEPHROURETERECTOMY N/A 11/13/2018    Procedure: Right DaVinci Assisted Nephroureterectomy;  Surgeon: Stuart King MD;  [facility]     ENDOSCOPIC ULTRASOUND LOWER GASTROINTESTIONAL TRACT (GI) N/A 10/30/2015    Procedure: ENDOSCOPIC ULTRASOUND LOWER GASTROINTESTIONAL TRACT (GI);  Surgeon: Daniel Jean-Baptiste MD;  Location: [facility]     EYE SURGERY  12/4/17     INSERT PORT VASCULAR ACCESS Right 12/19/2018    Procedure: Chest Port Placement - right;  Surgeon: Stuart Chavez PA-C;  [facility]     IR CHEST PORT PLACEMENT > 5 YRS OF AGE  12/19/2018     IR PORT REMOVAL RIGHT  6/26/2019     LAPAROSCOPIC CHOLECYSTECTOMY WITH CHOLANGIOGRAMS N/A 11/1/2015    Procedure: LAPAROSCOPIC CHOLECYSTECTOMY WITH CHOLANGIOGRAMS;  Surgeon: Tonie Warren MD;  [facility]     REMOVE PORT VASCULAR ACCESS Right 6/26/2019    Procedure: Right Port Removal;  Surgeon: Froilan Irizarry PA-C;  [facility]     SURGICAL HISTORY OF -   1996    malignant melanoma     SURGICAL HISTORY OF -   1968     thyroid nodule     SURGICAL HISTORY OF -       D & C          Anesthesia Evaluation     . Pt has had prior anesthetic. Type: General    History of anesthetic complications   - PONV        ROS/MED HX    ENT/Pulmonary:     (+)JESS risk factors snores loudly, hypertension, , . .   (-) tobacco use   Neurologic:  - neg neurologic ROS   (+)other neuro tremors, RLS    Cardiovascular: Comment:  Stress induced cardiomyopathy EF 35%. Angiogram no CAD. F/U ECHO Chaitanya EF 65%.   AF with RVR in setting of hyperthyroidism. Spontaneous conversion. Managed with B-blocker, ACEI, statin and ASA.   Xarelto DC'd 2/2 bleeding.   Chronic bilateral lower extremity lymphedema.        (+) hypertension----. : . CHF etiology: Stress induced cardiomyopathy 12/13/17 Last EF: 55-60% date: 9/2018 . . :. dysrhythmias a-fib, valvular problems/murmurs type: AS and AI mild to moderate on echo 2018:. Previous cardiac testing Echodate:9/2018results:Interpretation Summary  Left ventricular function, chamber size, wall motion, and wall thickness are  normal.The EF is 55-60%.  Right ventricular function, chamber size, wall motion, and thickness are  normal.  Severe left atrial enlargement is present.  Aortic valve poorly visualized but appears to have moderate calcification and  at least mild-moderate stenosis.  Mild to moderate aortic insufficiency is present.  IVC measures 2.47cm and collapses with inspiration. Estimated mean right  atrial pressure is 8 mmHg (mildly elevated).  No pericardial effusion is present.date: results:ECG reviewed date:2/3/19 results:SR, PACs, LADCath date: 12/2017 results:         (-) taking anticoagulants/antiplatelets   METS/Exercise Tolerance: Comment: Recently traveled to [place] to visit family. 3 - Able to walk 1-2 blocks without stopping   Hematologic:     (+) Anemia, -     (-) History of Transfusion   Musculoskeletal: Comment: Osteopenia    Osteoarthritis in knees bilaterally. Right hip pain.  (+) arthritis,  -        GI/Hepatic:     (+) GERD Asymptomatic on medication, cholecystitis/cholelithiasis (s/p cholecystectomy),       Renal/Genitourinary:     (+) chronic renal disease, type: CRI, Nephrolithiasis , Pt does not require dialysis, Pt has no history of transplant, Other Renal/ Genitourinary, Hematuria      Endo:     (+) thyroid problem (Graves disease)  hyperthyroidism, Obesity, .      Psychiatric:     (+) psychiatric history depression      Infectious Disease:  - neg infectious disease ROS       Malignancy:   (+) Malignancy History of Other  Skin CA status post Surgery, Other CA Urothelial cancer Active status post Surgery and Chemo         Other:    (+) C-spine cleared: N/A, no H/O Chronic Pain,no other significant disability                        PHYSICAL EXAM:   Mental Status/Neuro: A/A/O; Age Appropriate   Airway: Facies: Feasible  Mallampati: II  Mouth/Opening: Full  TM distance: > 6 cm  Neck ROM: Limited   Respiratory: Auscultation: CTAB     Resp. Rate: Normal     Resp. Effort: Normal      CV: Rhythm: Regular  Heart: Normal Sounds   Comments:                      LABS:  CBC:   Lab Results   Component Value Date    WBC 6.8 12/04/2019    WBC 5.5 10/04/2019    HGB 12.6 12/04/2019    HGB 12.8 10/04/2019    HCT 38.8 12/04/2019    HCT 38.0 10/04/2019     12/04/2019     10/04/2019     BMP:   Lab Results   Component Value Date     12/04/2019     10/04/2019    POTASSIUM 4.7 12/04/2019    POTASSIUM 4.2 10/04/2019    CHLORIDE 103 12/04/2019    CHLORIDE 102 10/04/2019    CO2 29 12/04/2019    CO2 29 10/04/2019    BUN 28 12/04/2019    BUN 19 10/04/2019    CR 1.09 (H) 12/04/2019    CR 0.96 10/04/2019     (H) 12/04/2019    GLC 84 10/04/2019     COAGS:   Lab Results   Component Value Date    PTT 27 02/07/2019    INR 0.99 06/26/2019     POC:   Lab Results   Component Value Date    BGM 99 12/11/2018    HCGS Negative 12/13/2017     OTHER:   Lab Results   Component Value Date    LACT 1.0 12/13/2017     "A1C 6.2 (H) 12/14/2017    SHREYA 9.1 12/04/2019    PHOS 2.4 (L) 12/13/2017    MAG 1.8 02/03/2019    ALBUMIN 3.5 12/04/2019    PROTTOTAL 7.3 12/04/2019    ALT 21 12/04/2019    AST 14 12/04/2019    ALKPHOS 104 12/04/2019    BILITOTAL 0.4 12/04/2019    LIPASE 91 12/26/2018    AMYLASE 44 10/29/2015    TSH 1.45 09/11/2019    T4 0.74 (L) 09/11/2019    T3 76 09/11/2019    CRP <2.9 10/06/2017    SED 25 10/06/2017        Preop Vitals    BP Readings from Last 3 Encounters:   12/04/19 (!) 141/74   10/04/19 136/62   10/03/19 (!) 150/70    Pulse Readings from Last 3 Encounters:   12/04/19 63   10/04/19 (!) 47   10/03/19 70      Resp Readings from Last 3 Encounters:   12/04/19 16   10/04/19 13   09/11/19 18    SpO2 Readings from Last 3 Encounters:   12/04/19 97%   10/04/19 95%   09/11/19 98%      Temp Readings from Last 1 Encounters:   12/04/19 98  F (36.7  C) (Oral)    Ht Readings from Last 1 Encounters:   10/04/19 1.448 m (4' 9\")      Wt Readings from Last 1 Encounters:   12/04/19 73.9 kg (163 lb)    Estimated body mass index is 35.27 kg/m  as calculated from the following:    Height as of 10/4/19: 1.448 m (4' 9\").    Weight as of 12/4/19: 73.9 kg (163 lb).     LDA:  Urethral Catheter Double-lumen;Latex;Straight-tip;Silver coated 16 fr (Active)   Number of days: 423        Assessment:   ASA SCORE: 3      Smoking Status:  Non-Smoker/Unknown   NPO Status: NPO Appropriate     Plan:   Anes. Type:  General   Pre-Medication: None   Induction:  IV (Standard)   Airway: LMA   Access/Monitoring: PIV   Maintenance: TIVA     Postop Plan:   Postop Pain: Opioids  Postop Sedation/Airway: Not planned  Disposition: Outpatient     PONV Management:   Adult Risk Factors: Female, Non-Smoker, Postop Opioids   Prevention: Ondansetron, Dexamethasone, No Volatiles     CONSENT: Direct conversation   Plan and risks discussed with: Patient   Blood Products: Consent Deferred (Minimal Blood Loss)                PAC Discussion and Assessment    ASA " Classification: 3  Case is suitable for: ASC  Anesthetic techniques and relevant risks discussed: GA and Regional  Invasive monitoring and risk discussed: No  Types:   Possibility and Risk of blood transfusion discussed: No  NPO instructions given:   Additional anesthetic preparation and risks discussed:   Needs early admission to pre-op area:   Other:     PAC Resident/NP Anesthesia Assessment:  Dimple Oviedo is a 86 year old female scheduled to undergo CYSTOSCOPY, WITH BLADDER NEOPLASM FULGURATION, possible left retrograde pyelogram and ureteroscopy with Dr. King on 1/13/20. She has the following specific operative considerations:   - RCRI : No serious cardiac risks.    - VTE risk: 0.5-3%  - JESS # of risks 3/8 = Intermediate risk  - Risk of PONV score = 4.  If > 2, anti-emetic intervention recommended.    --Urothelial carcinoma, s/p multiple procedures including Davinci nephroureterectomy on 11/13/18. Surveillance cystoscopy with cytology with cells suspicious for malignancy. Above procedure now planned. Cr 1.19  --HLD. HYPERTENSION. Will hold irbesartan but take Propranolol on DOS. Will take Lasix on DOS. ASA 81 mg daily with planned hold for surgery over the weekend. H/O stress cardiomyopathy with recovered EF as above.  EF 55-60% (echo 7/2018). Coronary angiogram 12/2017 with normal coronary arteries.  H/O atrial fibrillation with rapid ventricular response (1/2018). Thought to be r/t to fluid overload. Initially anticoagulated but  Xarelto stopped after first dose given significant hematuria. Holter monitor afterwards WNL. Mild to moderate aortic stenosis/insufficiency. Carotid US neg above. Limited activity, walks with cane due to knee pain. Recently traveled out of state to visit family.  today.  --Nonsmoker. No pulmonary symptoms.   --Anemia. Hgb 13.2 today. Denies history of blood transfusion.  --GERD. Will take Omeprazole on DOS.   --Graves disease, take Tapazole as prescribed DOS. Propranolol  also for thyroid disease.   --Depression. Will take Sertraline on DOS.   --RLS. Will take Requip on DOS.   --Right knee and hip pain. Will require careful positioning.            Patient was discussed with Dr Childers.       Reviewed and Signed by PAC Mid-Level Provider/Resident  Mid-Level Provider/Resident: NELSON Mtz, AMANDA  Date: 1/10/20  Time: 12:43pm    Attending Anesthesiologist Anesthesia Assessment:        Anesthesiologist:   Date:   Time:   Pass/Fail:   Disposition:     PAC Pharmacist Assessment:        Pharmacist:   Date:   Time:    NELSON Kwok CNS

## 2020-01-10 NOTE — H&P
"  Pre-Operative H & P     CC:  Preoperative exam to assess for increased cardiopulmonary risk while undergoing surgery and anesthesia.    Date of Encounter: 1/10/2020  Primary Care Physician:  Pop Zapien  Reason for visit: Urothelial cancer (H) [C68.9]  HPI  Dipmle Oviedo is a 86 year old female who presents for pre-operative H & P in preparation for CYSTOSCOPY, WITH BLADDER NEOPLASM FULGURATION, possible left retrograde pyelogram and ureteroscopy with Dr. King on 1/13/20 at [facility]. History is obtained from the patient, family and medical record.    Patient who is followed by Dr. King for urothelial carcinoma. She is s/p multiple procedures including Davinci nephroureterectomy in 2018. She is s/p 4 cycles of adjuvant carboplatin plus gemcitabine chemotherapy per POUT trial and has recovered well overall. On 10/3/19 she was seen by Dr. King for surveillance cystoscopy where a small erythematous region was seen on retroflexion of the scope. This area was not biopsied at that time, with plan to follow-up urine cytology from that day. Unfortunately, cytology showed cells \"suspicious for malignancy\". She has been counseled for above procedure.      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  She was initially anticoagulated but was dc'd related to hematuria. She underwent Holter monitoring in July 2018 that was within normal limits. She denies any chest pain, SOB, or palpitations. She is followed by Dr. Griffith and the [facility] last seen in 2018 and considered stable. She was released to primary care and/or prn return. 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 She was evaluated by IR with surveillance recommended.     Patient is treated for HLD, HTN, GERD, anemia, thyroid disease, lymphedema, RLS, and depression. " She is preparing for right knee replacement in February.    Past Medical History  Past Medical History:   Diagnosis Date     Calculus of kidney      Chemotherapy-induced neutropenia (H) 3/6/2019     Esophageal reflux      GERD (gastroesophageal reflux disease)      Hyperlipidemia LDL goal <130 5/9/2010     Malignant melanoma of skin of trunk, except scrotum (H)      Nonspecific abnormal finding     has living will 2004 -      Nontoxic multinodular goiter     no further eval /tx rec per pt     Osteopenia      Other psoriasis      Personal history of colonic polyps      PMR (polymyalgia rheumatica) (H)      Stress-induced cardiomyopathy      Undiagnosed cardiac murmurs      Unspecified constipation      Unspecified essential hypertension      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       Past Surgical History  Past Surgical History:   Procedure Laterality Date     BIOPSY       C NONSPECIFIC PROCEDURE  2005    colonoscopy polyp repeat 2010     COLONOSCOPY  2014     COMBINED CYSTOSCOPY, INSERT STENT URETER(S) Bilateral 9/12/2018    Procedure: COMBINED CYSTOSCOPY, INSERT STENT URETER(S);  Cystoscopy Bilateral ureteral Stent Placement.;  Surgeon: Justin Henry MD;  [facility]     COMBINED CYSTOSCOPY, RETROGRADES, URETEROSCOPY, INSERT STENT Bilateral 4/3/2018    Procedure: COMBINED CYSTOSCOPY, RETROGRADES, URETEROSCOPY, INSERT STENT;;  Surgeon: Stuart King MD;  [facility]     COMBINED CYSTOSCOPY, RETROGRADES, URETEROSCOPY, INSERT STENT Bilateral 9/10/2018    Procedure: COMBINED CYSTOSCOPY, RETROGRADES, URETEROSCOPY, INSERT STENT;  Cystoscopy, Bilateral Ureteroscopy, Bladder Biopsies, Retrogram Pyelograms, Ureteral Washings and brushings, cysview;  Surgeon: Stuart King MD;  [facility]     CYSTOSCOPY, BIOPSY BLADDER INSTILL OPTICAL AGENT N/A 4/3/2018    Procedure: CYSTOSCOPY, BIOPSY BLADDER INSTILL OPTICAL AGENT;  Cystoscopy, Blue Light Cystoscopy, Bladder Biopsies, Bilateral  Selective ureteral washings for Cytology, Bilateral Retrograde Pyelograms, Bilateral Ureteroscopy;  Surgeon: Stuart King MD;  [facility]     CYSTOSCOPY, BIOPSY BLADDER, COMBINED N/A 2/19/2018    Procedure: COMBINED CYSTOSCOPY, BIOPSY BLADDER;  Cystoscopy, Bladder Biopsy;  Surgeon: Kenna La MD;  [facility]     CYSTOSCOPY, REMOVE STENT(S), COMBINED  11/13/2018    Procedure: Flexible Cystoscopy with Stent Removal;  Surgeon: Stuart King MD;  [facility]     DAVINCI LYMPHADENECTOMY N/A 11/13/2018    Procedure: Davinci Lymphadenectomy ;  Surgeon: Staurt King MD;  [facility]     DAVINCI NEPHROURETERECTOMY N/A 11/13/2018    Procedure: Right DaVinci Assisted Nephroureterectomy;  Surgeon: Stuart King MD;  [facility]     ENDOSCOPIC ULTRASOUND LOWER GASTROINTESTIONAL TRACT (GI) N/A 10/30/2015    Procedure: ENDOSCOPIC ULTRASOUND LOWER GASTROINTESTIONAL TRACT (GI);  Surgeon: Daniel Jean-Baptiste MD;  [facility]     EYE SURGERY  12/4/17     INSERT PORT VASCULAR ACCESS Right 12/19/2018    Procedure: Chest Port Placement - right;  Surgeon: Stuart Chavez PA-C;  [facility]     IR CHEST PORT PLACEMENT > 5 YRS OF AGE  12/19/2018     IR PORT REMOVAL RIGHT  6/26/2019     LAPAROSCOPIC CHOLECYSTECTOMY WITH CHOLANGIOGRAMS N/A 11/1/2015    Procedure: LAPAROSCOPIC CHOLECYSTECTOMY WITH CHOLANGIOGRAMS;  Surgeon: Tonie Warren MD;  [facility]     REMOVE PORT VASCULAR ACCESS Right 6/26/2019    Procedure: Right Port Removal;  Surgeon: Froilan Irizarry PA-C;  [facility]     SURGICAL HISTORY OF -   1996    malignant melanoma     SURGICAL HISTORY OF -   1968    thyroid nodule     SURGICAL HISTORY OF -       D & C       Hx of Blood transfusions/reactions: Denies.     Hx of abnormal bleeding or anti-platelet use: Denies.    Menstrual history: No LMP recorded. Patient is postmenopausal.    Steroid use in the last  "year: Unknown.    Personal or FH with difficulty with Anesthesia:  PONV.    Prior to Admission Medications  Current Outpatient Medications   Medication Sig Dispense Refill     acetaminophen (TYLENOL) 650 MG CR tablet Take 1 tablet (650 mg) by mouth every 8 hours as needed for pain (Patient taking differently: Take 650 mg by mouth every 8 hours as needed for pain (PT last dose 1.10.2020) ) 100 tablet 0     alendronate (FOSAMAX) 70 MG tablet TAKE 1 TABLET EVERY 7 DAYS AT LEAST 60 MIN BEFORE BREAKFAST AS DIRECTED \"SEE PACKAGE FOR ADDITIONAL INSTRUCTIONS\" (Patient taking differently: Take 70 mg by mouth every 30 days TAKE 1 TABLET EVERY 7 DAYS AT LEAST 60 MIN BEFORE BREAKFAST AS DIRECTED \"SEE PACKAGE FOR ADDITIONAL INSTRUCTIONS\") 12 tablet 3     aspirin 81 MG tablet Take 81 mg by mouth every evening        Calcium Citrate-Vitamin D (CALCIUM + D PO) Take 1 tablet by mouth 2 times daily        cycloSPORINE (RESTASIS) 0.05 % ophthalmic emulsion Place 1 drop into both eyes 2 times daily       ferrous sulfate 325 (65 Fe) MG TBEC EC tablet Take 325 mg by mouth every morning        furosemide (LASIX) 20 MG tablet Take 1 tablet (20 mg) by mouth every morning 90 tablet 3     irbesartan (AVAPRO) 300 MG tablet TAKE 1 TABLET EVERY DAY (Patient taking differently: Take 300 mg by mouth every morning TAKE 1 TABLET EVERY DAY) 90 tablet 3     lovastatin (MEVACOR) 40 MG tablet TAKE 1 TABLET AT BEDTIME (HYPERLIPIDEMIA LDL GOAL BELOW 130) 90 tablet 3     methimazole (TAPAZOLE) 5 MG tablet 10 mg alternating with 15 mg every other day (Patient taking differently: Take 5 mg by mouth every morning ) 225 tablet 3     Omega-3 Fatty Acids (FISH OIL) 500 MG CAPS Take 1 capsule by mouth 2 times daily        omeprazole (PRILOSEC) 20 MG DR capsule TAKE 1 CAPSULE EVERY DAY (Patient taking differently: Take 20 mg by mouth every morning TAKE 1 CAPSULE EVERY DAY) 90 capsule 3     Probiotic Product (PROBIOTIC ADVANCED PO) Take 1 capsule by mouth every " morning        propranolol ER (INDERAL LA) 60 MG 24 hr capsule TAKE 1 CAPSULE EVERY DAY (Patient taking differently: Take 60 mg by mouth every evening TAKE 1 CAPSULE EVERY DAY) 90 capsule 1     rOPINIRole (REQUIP) 0.25 MG tablet 0.25 mg in the afternoon and 0.5 mg at night (Patient taking differently: Take by mouth 2 times daily 0.25 mg in the afternoon and 0.5 mg at night) 270 tablet 1     sertraline (ZOLOFT) 100 MG tablet TAKE 1 TABLET (100 MG) BY MOUTH EVERY MORNING 90 tablet 2     nitroGLYcerin (NITROSTAT) 0.4 MG sublingual tablet For chest pain place 1 tablet under the tongue every 5 minutes for 3 doses. If symptoms persist 5 minutes after 1st dose call 911. 25 tablet 3     traZODone (DESYREL) 50 MG tablet TAKE 1/2 TABLET AT BEDTIME (Patient taking differently: Take 50 mg by mouth At Bedtime ) 45 tablet 1       Allergies  Allergies   Allergen Reactions     Codeine        Social History  Social History     Socioeconomic History     Marital status:      Spouse name:      Number of children: 2     Years of education: Not on file     Highest education level: Not on file   Occupational History     Employer: RETIRED   Social Needs     Financial resource strain: Not on file     Food insecurity:     Worry: Not on file     Inability: Not on file     Transportation needs:     Medical: Not on file     Non-medical: Not on file   Tobacco Use     Smoking status: Never Smoker     Smokeless tobacco: Never Used   Substance and Sexual Activity     Alcohol use: No     Alcohol/week: 0.0 standard drinks     Drug use: No     Sexual activity: Yes     Partners: Male   Lifestyle     Physical activity:     Days per week: Not on file     Minutes per session: Not on file     Stress: Not on file   Relationships     Social connections:     Talks on phone: Not on file     Gets together: Not on file     Attends Mandaen service: Not on file     Active member of club or organization: Not on file     Attends meetings of clubs or  organizations: Not on file     Relationship status: Not on file     Intimate partner violence:     Fear of current or ex partner: Not on file     Emotionally abused: Not on file     Physically abused: Not on file     Forced sexual activity: Not on file   Other Topics Concern      Service No     Blood Transfusions No     Caffeine Concern Not Asked     Occupational Exposure Not Asked     Hobby Hazards Not Asked     Sleep Concern Not Asked     Stress Concern Not Asked     Weight Concern Not Asked     Special Diet Not Asked     Back Care Not Asked     Exercise Yes     Comment: curves     Bike Helmet Not Asked     Seat Belt Yes     Self-Exams Yes     Parent/sibling w/ CABG, MI or angioplasty before 65F 55M? No   Social History Narrative    December 7, 2009    Balanced Diet - Yes    Osteoporosis Prevention Measures - Dairy servings per day: 2 and Medication/Supplements (See current meds)    Regular Exercise -  Yes Describe curves, walking    Dental Exam - YES - Date: 10/2009    Eye Exam - YES - Date: 2008    Self Breast Exam - Yes    Abuse: Current or Past (Physical, Sexual or Emotional)- No    Do you feel safe in your environment - Yes    Guns stored in the home - No    Sunscreen used - Yes    Seatbelts used - Yes    Lipids -  YES - Date: 11/24/2009    Glucose -  YES - Date: 11/24/2009    Colon Cancer Screening - Colonoscopy 11/18/2005(date completed)    Hemoccults - YES - Date: 10/12/2004    Pap Test -  YES - Date: 09/22/2004    Do you have any concerns about STD's -  No    Mammography - YES - Date: 11/24/2009    DEXA - YES - Date: 11/20/2008    Immunizations reviewed and up to date - Yes    PAULETTE Spencer CMA               Family History  Family History   Problem Relation Age of Onset     Cancer Father         dec - esophageal and laryngeal     Heart Disease Mother      Respiratory Mother         dec     Breast Cancer Daughter      Other Cancer Daughter      Thyroid Disease Daughter      Asthma Daughter       Hyperlipidemia Son      Diabetes Son        Review of Systems  ROS/MED HX  The complete review of systems is negative other than noted in the HPI or here.     ENT/Pulmonary:     (+)JESS risk factors snores loudly, hypertension, , . .   (-) tobacco use   Neurologic:  - neg neurologic ROS   (+)other neuro tremors, RLS    Cardiovascular: Comment:  Stress induced cardiomyopathy EF 35%. Angiogram no CAD. F/U ECHO Chaitanya EF 65%.   AF with RVR in setting of hyperthyroidism. Spontaneous conversion. Managed with B-blocker, ACEI, statin and ASA.   Xarelto DC'd 2/2 bleeding.         (+) hypertension----. : . CHF etiology: Stress induced cardiomyopathy 12/13/17 Last EF: 55-60% date: 9/2018 . . :. dysrhythmias a-fib, valvular problems/murmurs type: AS and AI . Previous cardiac testing Echodate:9/2018results:  METS/Exercise Tolerance: Comment: METS<4 1 - Eating, dressing   Hematologic:     (+) Anemia, -     (-) History of Transfusion   Musculoskeletal: Comment: Osteopenia    Osteoarthritis in knees bilaterally.   (+) arthritis,  -       GI/Hepatic:     (+) GERD Asymptomatic on medication, cholecystitis/cholelithiasis (s/p cholecystectomy),       Renal/Genitourinary:     (+) chronic renal disease, type: CRI, Nephrolithiasis , Pt does not require dialysis, Pt has no history of transplant, Other Renal/ Genitourinary, Hematuria      Endo:     (+) thyroid problem (Graves disease)  hyperthyroidism, Obesity, .      Psychiatric:     (+) psychiatric history depression      Infectious Disease:  - neg infectious disease ROS       Malignancy:   (+) Malignancy History of Other  Skin CA status post Surgery, Other CA Urothelial cancer Active status post Surgery and Chemo         Other:    (+) C-spine cleared: N/A, no H/O Chronic Pain,no other significant disability            PHYSICAL EXAM:   Mental Status/Neuro: A/A/O; Age Appropriate   Airway: Facies: Feasible  Mallampati: II  Mouth/Opening: Full  TM distance: > 6 cm  Neck ROM: Limited    Respiratory: Auscultation: CTAB     Resp. Rate: Normal     Resp. Effort: Normal      CV: Rhythm: Regular  Heart: Normal Sounds   Comments:      Personally reviewed  LABS:  CBC:   Lab Results   Component Value Date    WBC 6.8 12/04/2019    WBC 5.5 10/04/2019    HGB 12.6 12/04/2019    HGB 12.8 10/04/2019    HCT 38.8 12/04/2019    HCT 38.0 10/04/2019     12/04/2019     10/04/2019     BMP:   Lab Results   Component Value Date     12/04/2019     10/04/2019    POTASSIUM 4.7 12/04/2019    POTASSIUM 4.2 10/04/2019    CHLORIDE 103 12/04/2019    CHLORIDE 102 10/04/2019    CO2 29 12/04/2019    CO2 29 10/04/2019    BUN 28 12/04/2019    BUN 19 10/04/2019    CR 1.09 (H) 12/04/2019    CR 0.96 10/04/2019     (H) 12/04/2019    GLC 84 10/04/2019     COAGS:   Lab Results   Component Value Date    PTT 27 02/07/2019    INR 0.99 06/26/2019     POC:   Lab Results   Component Value Date    BGM 99 12/11/2018    HCGS Negative 12/13/2017     OTHER:   Lab Results   Component Value Date    LACT 1.0 12/13/2017    A1C 6.2 (H) 12/14/2017    SHREYA 9.1 12/04/2019    PHOS 2.4 (L) 12/13/2017    MAG 1.8 02/03/2019    ALBUMIN 3.5 12/04/2019    PROTTOTAL 7.3 12/04/2019    ALT 21 12/04/2019    AST 14 12/04/2019    ALKPHOS 104 12/04/2019    BILITOTAL 0.4 12/04/2019    LIPASE 91 12/26/2018    AMYLASE 44 10/29/2015    TSH 1.45 09/11/2019    T4 0.74 (L) 09/11/2019    T3 76 09/11/2019    CRP <2.9 10/06/2017    SED 25 10/06/2017      Lab update today:  Lab Results   Component Value Date    WBC 7.7 01/10/2020     Lab Results   Component Value Date    RBC 4.13 01/10/2020     Lab Results   Component Value Date    HGB 13.2 01/10/2020     Lab Results   Component Value Date    HCT 41.0 01/10/2020     Lab Results   Component Value Date    MCV 99 01/10/2020     Lab Results   Component Value Date    MCH 32.0 01/10/2020     Lab Results   Component Value Date    MCHC 32.2 01/10/2020     Lab Results   Component Value Date    RDW 12.6  01/10/2020     Lab Results   Component Value Date     01/10/2020     Last Comprehensive Metabolic Panel:  Sodium   Date Value Ref Range Status   01/10/2020 135 133 - 144 mmol/L Final     Potassium   Date Value Ref Range Status   01/10/2020 4.2 3.4 - 5.3 mmol/L Final     Chloride   Date Value Ref Range Status   01/10/2020 101 94 - 109 mmol/L Final     Carbon Dioxide   Date Value Ref Range Status   01/10/2020 31 20 - 32 mmol/L Final     Anion Gap   Date Value Ref Range Status   01/10/2020 3 3 - 14 mmol/L Final     Glucose   Date Value Ref Range Status   01/10/2020 83 70 - 99 mg/dL Final     Urea Nitrogen   Date Value Ref Range Status   01/10/2020 34 (H) 7 - 30 mg/dL Final     Creatinine   Date Value Ref Range Status   01/10/2020 1.19 (H) 0.52 - 1.04 mg/dL Final     GFR Estimate   Date Value Ref Range Status   01/10/2020 41 (L) >60 mL/min/[1.73_m2] Final     Comment:     Non  GFR Calc  Starting 12/18/2018, serum creatinine based estimated GFR (eGFR) will be   calculated using the Chronic Kidney Disease Epidemiology Collaboration   (CKD-EPI) equation.       Calcium   Date Value Ref Range Status   01/10/2020 9.3 8.5 - 10.1 mg/dL Final     Lab Results   Component Value Date    AST 15 01/10/2020     Lab Results   Component Value Date    ALT 26 01/10/2020     Lab Results   Component Value Date    BILICONJ 0.0 09/10/2003      Lab Results   Component Value Date    BILITOTAL 0.4 01/10/2020     Lab Results   Component Value Date    ALBUMIN 3.6 01/10/2020     Lab Results   Component Value Date    PROTTOTAL 7.8 01/10/2020      Lab Results   Component Value Date    ALKPHOS 112 01/10/2020         Preop Vitals    BP Readings from Last 3 Encounters:   12/04/19 (!) 141/74   10/04/19 136/62   10/03/19 (!) 150/70    Pulse Readings from Last 3 Encounters:   12/04/19 63   10/04/19 (!) 47   10/03/19 70      Resp Readings from Last 3 Encounters:   12/04/19 16   10/04/19 13   09/11/19 18    SpO2 Readings from  "Last 3 Encounters:   12/04/19 97%   10/04/19 95%   09/11/19 98%      Temp Readings from Last 1 Encounters:   12/04/19 98  F (36.7  C) (Oral)    Ht Readings from Last 1 Encounters:   10/04/19 1.448 m (4' 9\")      Wt Readings from Last 1 Encounters:   12/04/19 73.9 kg (163 lb)    Estimated body mass index is 35.27 kg/m  as calculated from the following:    Height as of 10/4/19: 1.448 m (4' 9\").    Weight as of 12/4/19: 73.9 kg (163 lb).       Temp: 98.9  F (37.2  C) Temp src: Oral BP: 115/47 Pulse: 60   Resp: 15 SpO2: 97 %         162 lbs 11.2 oz  4' 9.75\"   Body mass index is 34.3 kg/m .       Physical Exam  Constitutional: Awake, alert, cooperative, no apparent distress, and appears stated age.  Eyes: Pupils equal, round and reactive to light, extra ocular muscles intact, sclera clear, conjunctiva normal. Glasses on.  HENT: Normocephalic, oral pharynx with moist mucus membranes, good dentition. No goiter appreciated.   Respiratory: Clear to auscultation bilaterally, no crackles or wheezing. No cough or obvious dyspnea.  Cardiovascular: Regular rate and rhythm, normal S1 and S2, and systolic murmur noted.  Carotids +2, with referred sounds, possibly bruit on right side. 2+ chronic bilateral lower extremity edema, wearing compression stockings. Palpable pulses to radial  DP and PT arteries.   GI: Normal bowel sounds, soft, non-distended, non-tender, no masses palpated. Difficult exam due to obese abdomen.  Lymph/Hematologic: No cervical lymphadenopathy and no supraclavicular lymphadenopathy.  Genitourinary: Deferred.   Skin: Warm and dry.    Musculoskeletal: Mildly limited ROM of neck. There is no redness, warmth, or swelling of the joints. Gross motor strength is normal.    Neurologic: Awake, alert, oriented to name, place and time. Cranial nerves II-XII are grossly intact. Gait is mildly irregular with limp.  Neuropsychiatric: Calm, cooperative. Normal affect.     EKG: Personally reviewed 2/3/19 Sinus rhythm with " PACs, left axis deviation  Cardiac echo: 9/11/18   Interpretation Summary  Left ventricular function, chamber size, wall motion, and wall thickness are  normal.The EF is 55-60%.  Right ventricular function, chamber size, wall motion, and thickness are  normal.  Severe left atrial enlargement is present.  Aortic valve poorly visualized but appears to have moderate calcification and  at least mild-moderate stenosis.  Mild to moderate aortic insufficiency is present.  IVC measures 2.47cm and collapses with inspiration. Estimated mean right  atrial pressure is 8 mmHg (mildly elevated).  No pericardial effusion is present.  US Carotid 8/29/18  Impression:  No hemodynamically significant stenosis in either carotid artery.  Angiogram 2017  Normal coronary arteries  Imaging and cardiac testing reviewed by this provider  12/4/19 CAP                                                                   IMPRESSION: No acute change since prior exam. No evidence of recurrent  or metastatic disease. Tiny lung nodules are stable. Prior right  nephrectomy. Left renal artery aneurysm is stable.    Imaging and cardiac testing reviewed by this provider    Outside records reviewed from: Care Everywhere    ASSESSMENT and PLAN  Dimple Oviedo is a 86 year old female scheduled to undergo CYSTOSCOPY, WITH BLADDER NEOPLASM FULGURATION, possible left retrograde pyelogram and ureteroscopy with Dr. King on 1/13/20. She has the following specific operative considerations:   - RCRI : No serious cardiac risks.    - Anesthesia considerations:  Refer to PAC assessment in anesthesia records  - VTE risk: 0.5-3%  - JESS # of risks 3/8 = Intermediate risk  - Risk of PONV score = 4.  If > 2, anti-emetic intervention recommended.    --Urothelial carcinoma, s/p multiple procedures including Davinci nephroureterectomy on 11/13/18. Surveillance cystoscopy with cytology with cells suspicious for malignancy. Above procedure now planned. Cr 1.19  --HLD.  HYPERTENSION. Will hold irbesartan but take Propranolol on DOS. Will take Lasix on DOS. ASA 81 mg daily with planned hold for surgery over the weekend. H/O stress cardiomyopathy with recovered EF as above.  EF 55-60% (echo 7/2018). Coronary angiogram 12/2017 with normal coronary arteries.  H/O atrial fibrillation with rapid ventricular response (1/2018). Thought to be r/t to fluid overload. Initially anticoagulated but  Xarelto stopped after first dose given significant hematuria. Holter monitor afterwards WNL. Mild to moderate aortic stenosis/insufficiency. Carotid US neg above. Limited activity, walks with cane due to knee pain. Recently traveled out of state to visit family.  today.  --Nonsmoker. No pulmonary symptoms.   --Anemia. Hgb 13.2 today. Denies history of blood transfusion.  --GERD. Will take Omeprazole on DOS.   --Graves disease, take Tapazole as prescribed DOS. Propranolol also for thyroid disease.   --Depression. Will take Sertraline on DOS.   --RLS. Will take Requip on DOS.   --Right knee and hip pain. Will require careful positioning.    Arrival time, NPO, shower and medication instructions provided by nursing staff today. Preparing For Your Surgery handout given.        Patient was discussed with Dr Childers.     NELSON Kwok CNS  Preoperative Assessment Center  [facility]  Phone: [phone]  Fax: [phone]

## 2020-01-10 NOTE — PATIENT INSTRUCTIONS
Preparing for Your Surgery      Name:  Dimple Oviedo   MRN:  [id number]   :  1933   Today's Date:  1/10/2020     Arriving for surgery:  Surgery date:  20  Arrival time:  7:15 am  Please come to:     [facility]  [address] [place] [id number]     Parking is available in front of the Clinics and Surgery Center building from 5:30AM to 8:00PM.  -  Proceed to the 5th floor to check into the Ambulatory Surgery Center.              >> There will be patient concierges on the 1st and 5th floor, for assistance or an escort, if you would like.              >> Please call [phone] with any questions.    What can I eat or drink?  -  You may have solid food or milk products until 8 hours prior to your surgery (midnight).  -  You may have water, apple juice or 7up/Sprite until 2 hours prior to your surgery (6:45 am).    Which medicines can I take?  Stop Aspirin, vitamins and supplements one week prior to surgery.  Hold Ibuprofen for 24 hours and/or Naproxen for 48 hours prior to surgery.     -  Do NOT take these medications in the morning, the day of surgery:  Irbesartan (Avapro)    -  Please take these medications the day of surgery:  Acetaminophen (Tylenol)  Cyclosporine (Restasis) eye drops  Methimazole (Tapazole)  Omeprazole (Prilosec)  Propranolol (Inderal)  Ropinirole (Requip)  Sertraline (Zoloft)  Nitroglycerin (Nitrostat) if needed      Furosemide (Lasix)    How do I prepare myself?  -  Take two showers: one the night before surgery; and one the morning of surgery.         Use Scrubcare or Hibiclens to wash from neck down, leave soap on your skin for up to one minute.  Do not get soap in your eyes or ears.  You may use your own shampoo and conditioner; no other hair products.   -  Do NOT use lotion, powder, deodorant, or antiperspirant the day of your surgery.  -  Do NOT wear any makeup, fingernail polish or jewelry.  -  Do not bring your own medications to the  hospital.  -  Bring your ID and insurance card.    -If you are scheduled to go home the Same Day as surgery you must have a responsible adult as a  and to stay with you overnight the first 24 hours after surgery.     Questions or Concerns:     -If you are scheduled at the Ambulatory Surgery Center and have questions or concerns regarding the day of surgery please call [phone].    -If you have health changes between today and your surgery please call your surgeon. For questions after surgery please call your surgeons office.     AFTER YOUR SURGERY  Breathing exercises   Breathing exercises help you recover faster. Take deep breaths and let the air out slowly. This will:     Help you wake up after surgery.    Help prevent complications like pneumonia.  Preventing complications will help you go home sooner.   We may give you a breathing device (incentive spirometer) to encourage you to breathe deeply.   Nausea and vomiting   You may feel sick to your stomach after surgery; if so, let your nurse know.    Pain control:  After surgery, you may have pain. Our goal is to help you manage your pain. Pain medicine will help you feel comfortable enough to do activities that will help you heal.  These activities may include breathing exercises, walking and physical therapy.   To help your health care team treat your pain we will ask: 1) If you have pain  2) where it is located 3) describe your pain in your words  Methods of pain control include medications given by mouth, vein or by nerve block for some surgeries.  Sequential Compression Device (SCD):  You may need to wear SCD S (also called pneumo boots)on your legs or feet. These are wraps connected to a machine that pumps in air and releases it. The repeated pumping helps prevent blood clots from forming.

## 2020-01-13 ENCOUNTER — HOSPITAL ENCOUNTER (OUTPATIENT)
Facility: AMBULATORY SURGERY CENTER | Age: 85
End: 2020-01-13
Attending: UROLOGY
Payer: MEDICARE

## 2020-01-13 ENCOUNTER — ANESTHESIA (OUTPATIENT)
Dept: SURGERY | Facility: AMBULATORY SURGERY CENTER | Age: 85
End: 2020-01-13

## 2020-01-13 VITALS
DIASTOLIC BLOOD PRESSURE: 62 MMHG | HEIGHT: 58 IN | WEIGHT: 162.7 LBS | OXYGEN SATURATION: 94 % | TEMPERATURE: 97.4 F | HEART RATE: 50 BPM | BODY MASS INDEX: 34.15 KG/M2 | RESPIRATION RATE: 16 BRPM | SYSTOLIC BLOOD PRESSURE: 140 MMHG

## 2020-01-13 DIAGNOSIS — C68.9 UROTHELIAL CANCER (H): ICD-10-CM

## 2020-01-13 PROCEDURE — 88305 TISSUE EXAM BY PATHOLOGIST: CPT | Performed by: UROLOGY

## 2020-01-13 RX ORDER — CEFAZOLIN SODIUM 1 G/50ML
1 SOLUTION INTRAVENOUS SEE ADMIN INSTRUCTIONS
Status: DISCONTINUED | OUTPATIENT
Start: 2020-01-13 | End: 2020-01-13 | Stop reason: HOSPADM

## 2020-01-13 RX ORDER — NALOXONE HYDROCHLORIDE 0.4 MG/ML
.1-.4 INJECTION, SOLUTION INTRAMUSCULAR; INTRAVENOUS; SUBCUTANEOUS
Status: DISCONTINUED | OUTPATIENT
Start: 2020-01-13 | End: 2020-01-14 | Stop reason: HOSPADM

## 2020-01-13 RX ORDER — SODIUM CHLORIDE, SODIUM LACTATE, POTASSIUM CHLORIDE, CALCIUM CHLORIDE 600; 310; 30; 20 MG/100ML; MG/100ML; MG/100ML; MG/100ML
INJECTION, SOLUTION INTRAVENOUS CONTINUOUS
Status: DISCONTINUED | OUTPATIENT
Start: 2020-01-13 | End: 2020-01-14 | Stop reason: HOSPADM

## 2020-01-13 RX ORDER — SODIUM CHLORIDE, SODIUM LACTATE, POTASSIUM CHLORIDE, CALCIUM CHLORIDE 600; 310; 30; 20 MG/100ML; MG/100ML; MG/100ML; MG/100ML
INJECTION, SOLUTION INTRAVENOUS CONTINUOUS
Status: DISCONTINUED | OUTPATIENT
Start: 2020-01-13 | End: 2020-01-13 | Stop reason: HOSPADM

## 2020-01-13 RX ORDER — CEFAZOLIN SODIUM 2 G/50ML
2 SOLUTION INTRAVENOUS
Status: COMPLETED | OUTPATIENT
Start: 2020-01-13 | End: 2020-01-13

## 2020-01-13 RX ORDER — OXYCODONE HYDROCHLORIDE 5 MG/1
5 TABLET ORAL EVERY 4 HOURS PRN
Status: DISCONTINUED | OUTPATIENT
Start: 2020-01-13 | End: 2020-01-14 | Stop reason: HOSPADM

## 2020-01-13 RX ORDER — PROPOFOL 10 MG/ML
INJECTION, EMULSION INTRAVENOUS CONTINUOUS PRN
Status: DISCONTINUED | OUTPATIENT
Start: 2020-01-13 | End: 2020-01-13

## 2020-01-13 RX ORDER — PROPOFOL 10 MG/ML
INJECTION, EMULSION INTRAVENOUS PRN
Status: DISCONTINUED | OUTPATIENT
Start: 2020-01-13 | End: 2020-01-13

## 2020-01-13 RX ORDER — ONDANSETRON 2 MG/ML
INJECTION INTRAMUSCULAR; INTRAVENOUS PRN
Status: DISCONTINUED | OUTPATIENT
Start: 2020-01-13 | End: 2020-01-13

## 2020-01-13 RX ORDER — ONDANSETRON 2 MG/ML
4 INJECTION INTRAMUSCULAR; INTRAVENOUS EVERY 30 MIN PRN
Status: DISCONTINUED | OUTPATIENT
Start: 2020-01-13 | End: 2020-01-14 | Stop reason: HOSPADM

## 2020-01-13 RX ORDER — MEPERIDINE HYDROCHLORIDE 25 MG/ML
12.5 INJECTION INTRAMUSCULAR; INTRAVENOUS; SUBCUTANEOUS
Status: DISCONTINUED | OUTPATIENT
Start: 2020-01-13 | End: 2020-01-14 | Stop reason: HOSPADM

## 2020-01-13 RX ORDER — LIDOCAINE 40 MG/G
CREAM TOPICAL
Status: DISCONTINUED | OUTPATIENT
Start: 2020-01-13 | End: 2020-01-13 | Stop reason: HOSPADM

## 2020-01-13 RX ORDER — HYDROMORPHONE HYDROCHLORIDE 1 MG/ML
.3-.5 INJECTION, SOLUTION INTRAMUSCULAR; INTRAVENOUS; SUBCUTANEOUS EVERY 10 MIN PRN
Status: DISCONTINUED | OUTPATIENT
Start: 2020-01-13 | End: 2020-01-14 | Stop reason: HOSPADM

## 2020-01-13 RX ORDER — ONDANSETRON 4 MG/1
4 TABLET, ORALLY DISINTEGRATING ORAL EVERY 30 MIN PRN
Status: DISCONTINUED | OUTPATIENT
Start: 2020-01-13 | End: 2020-01-14 | Stop reason: HOSPADM

## 2020-01-13 RX ORDER — FENTANYL CITRATE 50 UG/ML
25-50 INJECTION, SOLUTION INTRAMUSCULAR; INTRAVENOUS
Status: DISCONTINUED | OUTPATIENT
Start: 2020-01-13 | End: 2020-01-14 | Stop reason: HOSPADM

## 2020-01-13 RX ORDER — LIDOCAINE HYDROCHLORIDE 20 MG/ML
INJECTION, SOLUTION INFILTRATION; PERINEURAL PRN
Status: DISCONTINUED | OUTPATIENT
Start: 2020-01-13 | End: 2020-01-13

## 2020-01-13 RX ORDER — DEXAMETHASONE SODIUM PHOSPHATE 4 MG/ML
INJECTION, SOLUTION INTRA-ARTICULAR; INTRALESIONAL; INTRAMUSCULAR; INTRAVENOUS; SOFT TISSUE PRN
Status: DISCONTINUED | OUTPATIENT
Start: 2020-01-13 | End: 2020-01-13

## 2020-01-13 RX ORDER — ACETAMINOPHEN 325 MG/1
975 TABLET ORAL ONCE
Status: DISCONTINUED | OUTPATIENT
Start: 2020-01-13 | End: 2020-01-13 | Stop reason: HOSPADM

## 2020-01-13 RX ORDER — FENTANYL CITRATE 50 UG/ML
INJECTION, SOLUTION INTRAMUSCULAR; INTRAVENOUS PRN
Status: DISCONTINUED | OUTPATIENT
Start: 2020-01-13 | End: 2020-01-13

## 2020-01-13 RX ADMIN — CEFAZOLIN SODIUM 2 G: 2 SOLUTION INTRAVENOUS at 09:07

## 2020-01-13 RX ADMIN — PROPOFOL 150 MCG/KG/MIN: 10 INJECTION, EMULSION INTRAVENOUS at 09:01

## 2020-01-13 RX ADMIN — SODIUM CHLORIDE, SODIUM LACTATE, POTASSIUM CHLORIDE, CALCIUM CHLORIDE: 600; 310; 30; 20 INJECTION, SOLUTION INTRAVENOUS at 08:09

## 2020-01-13 RX ADMIN — LIDOCAINE HYDROCHLORIDE 60 MG: 20 INJECTION, SOLUTION INFILTRATION; PERINEURAL at 09:01

## 2020-01-13 RX ADMIN — FENTANYL CITRATE 25 MCG: 50 INJECTION, SOLUTION INTRAMUSCULAR; INTRAVENOUS at 09:01

## 2020-01-13 RX ADMIN — PROPOFOL 150 MG: 10 INJECTION, EMULSION INTRAVENOUS at 09:01

## 2020-01-13 RX ADMIN — ONDANSETRON 4 MG: 2 INJECTION INTRAMUSCULAR; INTRAVENOUS at 09:05

## 2020-01-13 RX ADMIN — DEXAMETHASONE SODIUM PHOSPHATE 4 MG: 4 INJECTION, SOLUTION INTRA-ARTICULAR; INTRALESIONAL; INTRAMUSCULAR; INTRAVENOUS; SOFT TISSUE at 09:05

## 2020-01-13 ASSESSMENT — MIFFLIN-ST. JEOR: SCORE: 1063.88

## 2020-01-13 NOTE — ANESTHESIA CARE TRANSFER NOTE
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    Anesthesia Type:   General     Note:  Airway :Room Air  Patient transferred to:PACU  Comments: VSS/WNL. Responds to name.Handoff Report: Identifed the Patient, Identified the Reponsible Provider, Reviewed the pertinent medical history, Discussed the surgical course, Reviewed Intra-OP anesthesia mangement and issues during anesthesia, Set expectations for post-procedure period and Allowed opportunity for questions and acknowledgement of understanding      Vitals: (Last set prior to Anesthesia Care Transfer)    CRNA VITALS  1/13/2020 0911 - 1/13/2020 0945      1/13/2020             Pulse:  60    SpO2:  98 %    Resp Rate (observed):  20    Resp Rate (set):  10                Electronically Signed By: NELSON Rodriguez CRNA  January 13, 2020  9:45 AM

## 2020-01-13 NOTE — ANESTHESIA POSTPROCEDURE EVALUATION
Anesthesia POST Procedure Evaluation    Patient: Dimple Oviedo   MRN:     [id number] Gender:   female   Age:    86 year old :      1933        Preoperative Diagnosis: Urothelial cancer (H) [C68.9]   Procedure(s):  CYSTOSCOPY, WITH  bladder biopsies and fulguration   Postop Comments: No value filed.       Anesthesia Type:  Not documented  General    Reportable Event: NO     PAIN: Uncomplicated   Sign Out status: Comfortable, Well controlled pain     PONV: No PONV   Sign Out status:  No Nausea or Vomiting     Neuro/Psych: Uneventful perioperative course   Sign Out Status: Preoperative baseline; Age appropriate mentation     Airway/Resp.: Uneventful perioperative course   Sign Out Status: Non labored breathing, age appropriate RR; Resp. Status within EXPECTED Parameters     CV: Uneventful perioperative course   Sign Out status: Appropriate BP and perfusion indices; Appropriate HR/Rhythm     Disposition:   Sign Out in:  Phase II  Disposition:  Home  Recovery Course: Uneventful  Follow-Up: Not required           Last Anesthesia Record Vitals:  CRNA VITALS  2020 0911 - 2020 1011      2020             Pulse:  60    SpO2:  98 %    Resp Rate (observed):  20    Resp Rate (set):  10          Last PACU Vitals:  Vitals Value Taken Time   /56 2020 10:00 AM   Temp 36.3  C (97.4  F) 2020  9:45 AM   Pulse 50 2020 10:00 AM   Resp 10 2020 10:01 AM   SpO2 97 % 2020 10:01 AM   Temp src     NIBP     Pulse     SpO2     Resp     Temp     Ht Rate     Temp 2     Vitals shown include unvalidated device data.      Electronically Signed By: Devendra Parmar MD, 2020, 10:38 AM

## 2020-01-13 NOTE — DISCHARGE INSTRUCTIONS
[facility]  Home Care Following Anesthesia  For 24 hours after surgery:  1. Get plenty of rest.  A responsible adult must stay with you for at least 24 hours after you leave the surgery center.  2. Do not drive or use heavy equipment.  If you have weakness or tingling, don't drive or use heavy equipment until this feeling goes away.   3. Do not drink alcohol.   4. Avoid strenuous or risky activities.  Ask for help when climbing stairs.  5. You may feel lightheaded.  IF so, sit for a few minutes before standing.  Have someone help you get up.   6. If you have nausea (feel sick to your stomach): Drink only clear liquids such as apple juice, ginger ale, broth or 7-Up.  Rest may also help.  Be sure to drink enough fluids.  Move to a regular diet as you feel able.   7. You may have a slight fever.  Call the doctor if your fever is over 100 F (37.7 C) (taken under the tongue) or lasts longer than 24 hours.  8. You may have a dry mouth, a sore throat, muscle aches or trouble sleeping. These should go away after 24 hours.  9. Do not make important or legal decisions.               Tips for taking pain medications  To get the best pain relief possible, remember these points:    Take pain medications as directed, before pain becomes severe.    Pain medication can upset your stomach: taking it with food may help.    Constipation is a common side effect of pain medication. Drink plenty of  fluids.    Eat foods high in fiber. Take a stool softener if recommended by your doctor or pharmacist.    Do not drink alcohol, drive or operate machinery while taking pain medications.    Ask about other ways to control pain, such as with heat, ice or relaxation.    Tylenol/Acetaminophen Consumption  To help encourage the safe use of acetaminophen, the makers of TYLENOL  have lowered the maximum daily dose for single-ingredient Extra Strength TYLENOL  (acetaminophen) products sold in the [place] from 8  pills per day (4,000 mg) to 6 pills per day (3,000 mg). The dosing interval has also changed from 2 pills every 4-6 hours to 2 pills every 6 hours.    If you feel your pain relief is insufficient, you may take Tylenol/Acetaminophen in addition to your narcotic pain medication.     Be careful not to exceed 3,000 mg of Tylenol/Acetaminophen in a 24 hour period from all sources.    If you are taking extra strength Tylenol/acetaminophen (500 mg), the maximum dose is 6 tablets in 24 hours.    If you are taking regular strength acetaminophen (325 mg), the maximum dose is 9 tablets in 24 hours.    Call a doctor for any of the followin. Signs of infection (fever, growing tenderness at the surgery site, a large amount of drainage or bleeding, severe pain, foul-smelling drainage, redness, swelling).  2. It has been over 8 to 10 hours since surgery and you are still not able to urinate (pass water).  3. Headache for over 24 hours.  4. Numbness, tingling or weakness the day after surgery (if you had spinal anesthesia).  Your doctor is:       Dr. Stuart King, Prostate and Urology: [phone]               Or dial [phone] and ask for the resident on call for:  Prostate Urology  For emergency care, call the:  [facility]:  [phone] (TTY for hearing impaired: [phone])            Discharge Instructions Following a Cystoscopy, Stent and/or Ureteroscopy    Drainage/Urination:    Urine may be slightly bloody but should taper off in a few days.    You may have some frequency and urgency for a few days.    Comfort:    A burning sensation when urinating is common. Drinking extra fluids helps decrease this burning feeling and also helps to clear the bloody urine.    Mild abdominal cramps may occur for a few days.    Baths:    Daily tub baths aide in passing urine.    Frequent use of bubble bath is discouraged since it encourages urinary tract infections.    Report to your doctor at once if you  experience:    Inability to pass your urine    Continuous or heavy bleeding    Severe Pain    Elevated temperature > than 101.5 for 24 hours    Home Activity:    On the day of surgery spend a quiet evening at home.    Increase activity as tolerated.

## 2020-01-13 NOTE — LETTER
"    January 20, 2020       TO: Dimple Valdez  [address]  [facility] [address]       DearMs.Preet,    We are writing to inform you of your test results.        Resulted Orders   Surgical pathology exam   Result Value Ref Range    Copath Report       Patient Name: DIMPLE VALDEZ  MR#: [id number]  Specimen #:   Collected: 1/13/2020  Received: 1/13/2020  Reported: 1/14/2020 11:36  Ordering Phy(s): ADAM RODRIGUEZ    For improved result formatting, select 'View Enhanced Report Format' under   Linked Documents section.    SPECIMEN(S):  A: Bladder biopsy, posterior bladder wall lesion  B: Bladder biopsy, dome lesion  C: Bladder biopsy, random    FINAL DIAGNOSIS:  A. 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 Bladder biopsy, random:  - Benign urothelium    I have personally reviewed all specimens and/or slides, including the   listed special stains, and used them  with my medical judgement to determine or confirm the final diagnosis.    Electronically signed out by:    Radha Vásquez M.D., [facility]    GROSS:  A: The specimen is received in formalin with proper patient   identification, labeled \"posterior bladd er wall  lesion\".  The specimen consists of one segment of tan- pink soft tissue   measuring 0.4 cm in greatest  dimension.  It is wrapped and entirely submitted in cassette A1.    B: The specimen is received in formalin with proper patient   identification, labeled \"dome lesion\".  The  specimen consists of three segments of tan- red soft tissue ranging from   0.4-0.5 cm in greatest dimension.  It  is wrapped and entirely submitted in cassette B1.    C: The specimen is received in formalin with proper patient   identification, labeled \"random bladder biopsy\".  The specimen consists of one segment of tan soft tissue measuring 0.3 cm   in greatest dimension.  It is " wrapped  and entirely submitted in cassette C1. (Dictated by: Jumana SCHRADER 1/13/2020 11:12 AM)    MICROSCOPIC:  Microscopic examination was performed.    The technical component of this testing was completed at the [facility], with the professional component kim viecnte   at the [facility], [address] [place] [phone] ([phone])    CPT Codes:  A: 72057-QQ9  B: 52829-PN9  C: 10251-IJ4    COLLECTION SITE:  Client: [facility]  Location: OR (B)           Dr Davidson Weight

## 2020-01-13 NOTE — OP NOTE
OPERATIVE REPORT    PREOPERATIVE DIAGNOSIS:  Bladder cancer    POSTOPERATIVE DIAGNOSIS: Same    PROCEDURES PERFORMED:   1. Cystourethroscopy  2. Targeted and random bladder biopsies  3. Fulguration of biopsied areas    STAFF SURGEON: Stuart King MD    RESIDENT(S):  Haris Garza MD    ANESTHESIA: General    ESTIMATED BLOOD LOSS: <=5 mL.     DRAINS: None    OPERATIVE INDICATIONS:   Dimple Oviedo is a(n) 86 year old female with a history of bladder cancer found to have erythematous area on office cystoscopy and suspicious fish.  The patient elected for biopsy and maximal fulguration, after a discussion of all risks, benefits, and alternatives.    DESCRIPTION OF PROCEDURE:   After verification of informed consent was obtained, the patient was brought to the operating room, and moved to the operating table. After adequate anesthesia was induced, the patient was repositioned in dorsal lithotomy position and prepped and draped in the usual sterile fashion. A formal timeout was performed to confirm the correct patient, procedure and operative site.     A 22-Polish rigid cystoscope was inserted into a well lubricated urethra. We began by performing a white light cystourethroscopy. The urethra was unremarkable. The ureteral orifices were in their orthotopic positions bilaterally. There were no intravesical stones or diverticuli and the bladder was mildly trabeculated. There was two erythematous areas, on on posterior wall and one on bladder dome.     We used a cold-cup biopsy forceps to biopsy both erythematous area and we took a random sample from left lateral wall,  passed these off for pathology. A Bugby was used to fulgurate arterial bleeders and the biopsied areas. The bladder was re-examined under low pressure and hemostasis was confirmed. At this point, the bladder was drained and the cystoscope withdrawn.    The procedure was then concluded. The patient was transferred to the postanesthesia care unit in  stable condition and tolerated the procedure well.

## 2020-01-14 LAB — COPATH REPORT: NORMAL

## 2020-01-16 ENCOUNTER — TELEPHONE (OUTPATIENT)
Dept: ORTHOPEDICS | Facility: CLINIC | Age: 85
End: 2020-01-16

## 2020-01-16 NOTE — TELEPHONE ENCOUNTER
[facility] Call Center    Phone Message    May a detailed message be left on voicemail: yes    Reason for Call: Other: Pt requesting call back from [facility]. Pt needing to make sure that she has her date of surgery correct, that it is 2/17. Pt stated she has a piece of paper from [facility] that says it is on 2/11     Action Taken: Message routed to:  [facility] ([facility] ortho

## 2020-01-16 NOTE — TELEPHONE ENCOUNTER
Spoke to pt and confirmed with her that her surgery is on 2/17/20. Pt had no other questions at this time.

## 2020-01-22 ASSESSMENT — ENCOUNTER SYMPTOMS
HEMATURIA: 0
HEADACHES: 0
TINGLING: 0
DIFFICULTY URINATING: 0
SPEECH CHANGE: 0
LOSS OF CONSCIOUSNESS: 0
DIZZINESS: 0
FLANK PAIN: 0
STIFFNESS: 0
JOINT SWELLING: 0
MEMORY LOSS: 0
TREMORS: 0
SEIZURES: 0
MUSCLE CRAMPS: 0
ARTHRALGIAS: 1
PARALYSIS: 0
NECK PAIN: 0
MYALGIAS: 0
DYSURIA: 0
BACK PAIN: 1
WEAKNESS: 0
NUMBNESS: 0
MUSCLE WEAKNESS: 0
DISTURBANCES IN COORDINATION: 0

## 2020-01-23 LAB — COPATH REPORT: NORMAL

## 2020-01-24 NOTE — MR AVS SNAPSHOT
After Visit Summary   10/22/2018    Dimple Oviedo    MRN: [id number]           Patient Information     Date Of Birth          6/23/1933        Visit Information        Provider Department      10/22/2018 3:00 PM Pop Zapien MD [facility]        Today's Diagnoses     Dysuria    -  1    Restless legs syndrome        Chronic pain of right knee           Follow-ups after your visit        Your next 10 appointments already scheduled     Nov 01, 2018 12:30 PM CDT   LAB with [facility] ([facility])    [address] [place] [address]   [phone]           Please do not eat 10-12 hours before your appointment if you are coming in fasting for labs on lipids, cholesterol, or glucose (sugar). This does not apply to pregnant women. Water, hot tea and black coffee (with nothing added) are okay. Do not drink other fluids, diet soda or chew gum.            Nov 01, 2018  1:00 PM CDT   (Arrive by 12:45 PM)   PAC EVALUATION with NELSON Kwok [facility] ([facility])    [address] [place] [id number] [phone]            Nov 13, 2018   Procedure with Stuart King MD   [facility] [place], Same Day Surgery (--)    [address], [place] [address]   [phone]            Nov 26, 2018  1:30 PM CST   US THYROID with US2   [facility] US ([facility])    [address] [place] [id number] [phone]           How do I prepare for my exam? (Food and drink instructions) No Food and Drink Restrictions.  How do I prepare for my exam? (Other instructions) You do not need to do anything special to prepare for your exam.  What should I wear: Wear comfortable clothes.  How long does the exam take: Most ultrasounds take 30 to 60 minutes.  What should I bring: Bring  Comments:ANY COMBO a list of your medicines, including vitamins, minerals and over-the-counter drugs. It is safest to leave personal items at home.  Do I need a :  No  is needed.  What do I need to tell my doctor: Tell your doctor about any allergies you may have.  What should I do after the exam: No restrictions, You may resume normal activities.  What is this test: An ultrasound uses sound waves to make pictures of the body. Sound waves do not cause pain. The only discomfort may be the pressure of the wand against your skin or full bladder.  Who should I call with questions: If you have any questions, please call the Imaging Department where you will have your exam. Directions, parking instructions, and other information is available on our website, [email or web address].            Nov 26, 2018  2:15 PM CST   LAB with  LAB   [facility] ([facility])    [address] [place] [address]  [address] [facility] [id number] [phone]           Please do not eat 10-12 hours before your appointment if you are coming in fasting for labs on lipids, cholesterol, or glucose (sugar). This does not apply to pregnant women. Water, hot tea and black coffee (with nothing added) are okay. Do not drink other fluids, diet soda or chew gum.            Nov 27, 2018 11:30 AM CST   (Arrive by 11:15 AM)   Post-Op with ALYSSA Mejia   [facility] ([facility])    [address] [place] [address]  [address] [facility] [address]   [phone]            Dec 04, 2018  1:30 PM CST   (Arrive by 1:15 PM)   RETURN ENDOCRINE with Dhara Meza MD   [facility] Endocrinology ([facility])    [address] [place] [address]  [address] [facility] [address]   [phone]            Dec 06, 2018 11:00 AM CST   (Arrive by 10:45 AM)   Post-Op with Stuart King MD   [facility] (  [facility])    [address]  4th Floor  [facility] [address] [phone]              Who to contact     If you have questions or need follow up information about today's clinic visit or your schedule please contact [facility] directly at [phone].  Normal or non-critical lab and imaging results will be communicated to you by MyChart, letter or phone within 4 business days after the clinic has received the results. If you do not hear from us within 7 days, please contact the clinic through [employer] or phone. If you have a critical or abnormal lab result, we will notify you by phone as soon as possible.  Submit refill requests through [employer] or call your pharmacy and they will forward the refill request to us. Please allow 3 business days for your refill to be completed.          Additional Information About Your Visit        MyChart Information     [employer] gives you secure access to your electronic health record. If you see a primary care provider, you can also send messages to your care team and make appointments. If you have questions, please call your primary care clinic.  If you do not have a primary care provider, please call [phone] and they will assist you.        Care EveryWhere ID     This is your Care EveryWhere ID. This could be used by other organizations to access your [place] medical records  [id number]        Your Vitals Were     Pulse Temperature Respirations Height Pulse Oximetry BMI (Body Mass Index)    84 97.1  F (36.2  C) (Oral) 16 4' 9\" (1.448 m) 97% 30.94 kg/m2       Blood Pressure from Last 3 Encounters:   10/22/18 120/70   10/13/18 136/46   09/27/18 116/67    Weight from Last 3 Encounters:   10/22/18 143 lb (64.9 kg)   10/13/18 143 lb 1.6 oz (64.9 kg)   09/27/18 141 lb (64 kg)              We Performed the Following     DRAIN/INJECT LARGE JOINT/BURSA     TRIAMCINOLONE ACET INJ NOS     UA reflex to Microscopic     Urine Culture " Aerobic Bacterial     Urine Microscopic          Today's Medication Changes          These changes are accurate as of 10/22/18 11:59 PM.  If you have any questions, ask your nurse or doctor.               Start taking these medicines.        Dose/Directions    sulfamethoxazole-trimethoprim 800-160 MG per tablet   Commonly known as:  BACTRIM DS/SEPTRA DS   Used for:  Dysuria   Started by:  Pop Zapien MD        Dose:  1 tablet   Take 1 tablet by mouth 2 times daily   Quantity:  14 tablet   Refills:  0       triamcinolone acetonide 40 MG/ML injection   Commonly known as:  KENALOG-40   Used for:  Chronic pain of right knee   Started by:  Pop Zapien MD        Dose:  40 mg   1 mL (40 mg) by INTRA-ARTICULAR route once for 1 dose   Quantity:  1 mL   Refills:  0         These medicines have changed or have updated prescriptions.        Dose/Directions    irbesartan 300 MG tablet   Commonly known as:  AVAPRO   This may have changed:    - how much to take  - how to take this  - when to take this  - additional instructions   Used for:  Essential hypertension with goal blood pressure less than 140/90        TAKE 1 TABLET EVERY DAY   Quantity:  90 tablet   Refills:  2       methimazole 5 MG tablet   Commonly known as:  TAPAZOLE   This may have changed:  when to take this   Used for:  Nontoxic multinodular goiter        Dose:  5 mg   Take 1 tablet (5 mg) by mouth daily   Quantity:  90 tablet   Refills:  1       omeprazole 20 MG CR capsule   Commonly known as:  priLOSEC   This may have changed:  when to take this   Used for:  Gastroesophageal reflux disease without esophagitis        Dose:  20 mg   Take 1 capsule (20 mg) by mouth daily   Quantity:  180 capsule   Refills:  3       propranolol 60 MG 24 hr capsule   Commonly known as:  INDERAL LA   This may have changed:  when to take this   Used for:  Nontoxic multinodular goiter        Dose:  60 mg   Take 1 capsule (60 mg) by mouth daily   Quantity:  90  capsule   Refills:  1       rOPINIRole 0.25 MG tablet   Commonly known as:  REQUIP   This may have changed:    - how much to take  - how to take this  - when to take this  - reasons to take this  - additional instructions   Used for:  Restless legs syndrome   Changed by:  Pop Zapien MD        Dose:  0.25 mg   Take 1 tablet (0.25 mg) by mouth 2 times daily as needed (restless leg syndrome)   Quantity:  180 tablet   Refills:  1            Where to get your medicines      These medications were sent to [facility] - [facility] [address] [facility]  [address] [facility] [address]     Phone:  [phone]     rOPINIRole 0.25 MG tablet         These medications were sent to [facility] [address] [place] [address] AT [facility] & [address] [place] [address]     Phone:  [phone]     sulfamethoxazole-trimethoprim 800-160 MG per tablet         Some of these will need a paper prescription and others can be bought over the counter.  Ask your nurse if you have questions.     You don't need a prescription for these medications     triamcinolone acetonide 40 MG/ML injection                Primary Care Provider Office Phone # Fax #    Pop Zapien [phone] [address] [place] [address]        Equal Access to Services     [facility] AH: Hadii shameka chacon haddarcyo Sogabo, waaxda luqadaha, qaybta kaalmada adedc, maldonado seay . So [facility] [phone].    ATENCIÓN: Si habla español, tiene a sierra disposición servicios gratuitos de asistencia lingüística. Al al [phone].    We comply with applicable federal civil rights laws and [place] laws. We do not discriminate on the basis of race, color, national origin, age, disability, sex, sexual orientation, or gender identity.            Thank you!     Thank you for choosing [facility]  for  your care. Our goal is always to provide you with excellent care. Hearing back from our patients is one way we can continue to improve our services. Please take a few minutes to complete the written survey that you may receive in the mail after your visit with us. Thank you!             Your Updated Medication List - Protect others around you: Learn how to safely use, store and throw away your medicines at www.disposemymeds.org.          This list is accurate as of 10/22/18 11:59 PM.  Always use your most recent med list.                   Brand Name Dispense Instructions for use Diagnosis    acetaminophen 650 MG CR tablet    TYLENOL     Take 650 mg by mouth as needed        alendronate 70 MG tablet    FOSAMAX    12 tablet    TAKE 1 TABLET EVERY 7 DAYS AT LEAST 60 MINUTES BEFORE BREAKFAST AS DIRECTED. SEE PACKAGE FOR ADDITIONAL INSTRUCTIONS    High risk medication use, Osteopenia       aspirin 81 MG tablet      Take 81 mg by mouth daily        CALCIUM + D PO      Take 1 tablet by mouth 2 times daily        colestipol 1 g tablet    COLESTID     Take 1 g by mouth 2 times daily TAKE OTHER MEDS 1 HOUR BEFORE OR 4 HOURS AFTER COLESID        cycloSPORINE 0.05 % ophthalmic emulsion    RESTASIS     Place 1 drop into both eyes 2 times daily        diazepam 2 MG tablet    VALIUM          ferrous sulfate 325 (65 Fe) MG Tbec EC tablet      Take 325 mg by mouth daily        Fish Oil 500 MG Caps      Take 1 capsule by mouth 2 times daily        furosemide 20 MG tablet    LASIX    90 tablet    Take 1 tablet (20 mg) by mouth every morning    Stasis edema of both lower extremities, (HFpEF) heart failure with preserved ejection fraction (H)       HYDROcodone-acetaminophen 5-325 MG per tablet    NORCO    10 tablet    Take 1 tablet by mouth every 6 hours as needed for severe pain        irbesartan 300 MG tablet    AVAPRO    90 tablet    TAKE 1 TABLET EVERY DAY    Essential hypertension with goal blood pressure less than 140/90        lovastatin 40 MG tablet    MEVACOR    90 tablet    TAKE 1 TABLET AT BEDTIME (HYPERLIPIDEMIA LDL GOAL BELOW 130)    Hyperlipidemia LDL goal <130       Melatonin 10 MG Tabs tablet      Take 10 mg by mouth as needed for sleep        methimazole 5 MG tablet    TAPAZOLE    90 tablet    Take 1 tablet (5 mg) by mouth daily    Nontoxic multinodular goiter       nitroGLYcerin 0.4 MG sublingual tablet    NITROSTAT    25 tablet    For chest pain place 1 tablet under the tongue every 5 minutes for 3 doses. If symptoms persist 5 minutes after 1st dose call 911.    Elevated troponin       omeprazole 20 MG CR capsule    priLOSEC    180 capsule    Take 1 capsule (20 mg) by mouth daily    Gastroesophageal reflux disease without esophagitis       order for DME     1 each    Equipment being ordered: Knee high compression for B LE 20-30mmHg, Velcro units for night time (consider hybrid sock as foot swelling is less than leg swelling), Donning device    Lymphedema of both lower extremities       PROBIOTIC ADVANCED PO      Take 1 capsule by mouth every morning        propranolol 60 MG 24 hr capsule    INDERAL LA    90 capsule    Take 1 capsule (60 mg) by mouth daily    Nontoxic multinodular goiter       rOPINIRole 0.25 MG tablet    REQUIP    180 tablet    Take 1 tablet (0.25 mg) by mouth 2 times daily as needed (restless leg syndrome)    Restless legs syndrome       senna-docusate 8.6-50 MG per tablet    SENOKOT-S;PERICOLACE    45 tablet    Take 1-2 tablets by mouth 2 times daily To prevent constipation    Acute kidney injury (H)       sertraline 100 MG tablet    ZOLOFT    90 tablet    Take 1 tablet (100 mg) by mouth every morning    THERON (generalized anxiety disorder)       sulfamethoxazole-trimethoprim 800-160 MG per tablet    BACTRIM DS/SEPTRA DS    14 tablet    Take 1 tablet by mouth 2 times daily    Dysuria       SYSTANE 0.4-0.3 % Soln ophthalmic solution   Generic drug:  polyethylene glycol 0.4%- propylene glycol 0.3%      Place 1  drop into both eyes 2 times daily as needed for dry eyes        traZODone 50 MG tablet    DESYREL    45 tablet    TAKE 1/2 TABLET(25 MG)  AT BEDTIME    Insomnia, unspecified type       triamcinolone acetonide 40 MG/ML injection    KENALOG-40    1 mL    1 mL (40 mg) by INTRA-ARTICULAR route once for 1 dose    Chronic pain of right knee

## 2020-01-28 ENCOUNTER — PRE VISIT (OUTPATIENT)
Dept: UROLOGY | Facility: CLINIC | Age: 85
End: 2020-01-28

## 2020-01-28 ENCOUNTER — OFFICE VISIT (OUTPATIENT)
Dept: ENDOCRINOLOGY | Facility: CLINIC | Age: 85
End: 2020-01-28
Payer: MEDICARE

## 2020-01-28 ENCOUNTER — TELEPHONE (OUTPATIENT)
Facility: CLINIC | Age: 85
End: 2020-01-28

## 2020-01-28 VITALS
HEART RATE: 61 BPM | WEIGHT: 163.9 LBS | BODY MASS INDEX: 34.4 KG/M2 | SYSTOLIC BLOOD PRESSURE: 156 MMHG | HEIGHT: 58 IN | DIASTOLIC BLOOD PRESSURE: 65 MMHG

## 2020-01-28 DIAGNOSIS — E05.00 GRAVES DISEASE: Primary | ICD-10-CM

## 2020-01-28 DIAGNOSIS — E05.00 GRAVES DISEASE: ICD-10-CM

## 2020-01-28 LAB
T3 SERPL-MCNC: 95 NG/DL (ref 60–181)
T4 FREE SERPL-MCNC: 0.8 NG/DL (ref 0.76–1.46)
TSH SERPL DL<=0.005 MIU/L-ACNC: 1.43 MU/L (ref 0.4–4)

## 2020-01-28 PROCEDURE — 84480 ASSAY TRIIODOTHYRONINE (T3): CPT | Performed by: INTERNAL MEDICINE

## 2020-01-28 ASSESSMENT — PAIN SCALES - GENERAL: PAINLEVEL: NO PAIN (0)

## 2020-01-28 ASSESSMENT — MIFFLIN-ST. JEOR: SCORE: 1065.26

## 2020-01-28 NOTE — LETTER
1/28/2020       RE: Dimple Oviedo  [address]  [facility] [address]     Dear Colleague,    Thank you for referring your patient, Dimple Oviedo, to the [facility] ENDOCRINOLOGY at [facility]. Please see a copy of my visit note below.    This 86 year old woman is here for f/u of Graves.  She first developed symptoms of hyperthyroidism in December 2017.  She was most troubled by the tremor that occurred.  She had not noticed change in her skin or hair.  She saw Dr. Rojas in the community and he started her on methimazole.  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  Because she had received an iodine dye load when she had a coronary angiogram, we were unable to do a radioiodine scan and uptake to determine the precise cause of her hyperthyroidism.  She was maintained on methimazole until June 2017 when it was stopped.  A radioiodine uptake and scan was done in July, showed an uptake of ~ 70%, and confirmed she had Graves' disease.  She was restarted on methimazole in July 2018 and we have been adjusting the dose since then.  She has been taking 5 mg every AM every day.      Today she is here with her daughter in law.  She reports she feels well.  Her energy level has improved.  Her appetite is fine.  She has no problems with temperature tolerance.  She occasionally has some tremor but this is much better than it was in the past.  She said no change in her vision.  She is occasionally constipated.  She is noted no change in her hair or skin.  She feels like she is back to where she felt 3 years ago before she became ill.       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 She will likely be undergoing chemo again.    Her right knee has become very painful and she is planning to have knee  "replacement in February.  She had a cortisone shot in the fall that helped, but the pain is backed.  She isn't moving as much as she wants to and is concerned about weight gain.    Her blood pressure is checked regularly at her facility and she reports is always much better controlled than today.    acetaminophen (TYLENOL) 650 MG CR tablet, Take 1 tablet (650 mg) by mouth every 8 hours as needed for pain (Patient taking differently: Take 650 mg by mouth every 8 hours as needed for pain (PT last dose 1.10.2020) )  alendronate (FOSAMAX) 70 MG tablet, TAKE 1 TABLET EVERY 7 DAYS AT LEAST 60 MIN BEFORE BREAKFAST AS DIRECTED \"SEE PACKAGE FOR ADDITIONAL INSTRUCTIONS\" (Patient taking differently: Take 70 mg by mouth every 30 days TAKE 1 TABLET EVERY 7 DAYS AT LEAST 60 MIN BEFORE BREAKFAST AS DIRECTED \"SEE PACKAGE FOR ADDITIONAL INSTRUCTIONS\")  aspirin 81 MG tablet, Take 81 mg by mouth every evening   Calcium Citrate-Vitamin D (CALCIUM + D PO), Take 1 tablet by mouth 2 times daily   cycloSPORINE (RESTASIS) 0.05 % ophthalmic emulsion, Place 1 drop into both eyes 2 times daily  ferrous sulfate 325 (65 Fe) MG TBEC EC tablet, Take 325 mg by mouth every morning   furosemide (LASIX) 20 MG tablet, Take 1 tablet (20 mg) by mouth every morning  irbesartan (AVAPRO) 300 MG tablet, TAKE 1 TABLET EVERY DAY (Patient taking differently: Take 300 mg by mouth every morning TAKE 1 TABLET EVERY DAY)  lovastatin (MEVACOR) 40 MG tablet, TAKE 1 TABLET AT BEDTIME (HYPERLIPIDEMIA LDL GOAL BELOW 130)  methimazole (TAPAZOLE) 5 MG tablet, 10 mg alternating with 15 mg every other day (Patient taking differently: Take 5 mg by mouth every morning )  nitroGLYcerin (NITROSTAT) 0.4 MG sublingual tablet, For chest pain place 1 tablet under the tongue every 5 minutes for 3 doses. If symptoms persist 5 minutes after 1st dose call 911.  Omega-3 Fatty Acids (FISH OIL) 500 MG CAPS, Take 1 capsule by mouth 2 times daily   omeprazole (PRILOSEC) 20 MG DR capsule, " "TAKE 1 CAPSULE EVERY DAY (Patient taking differently: Take 20 mg by mouth every morning TAKE 1 CAPSULE EVERY DAY)  Probiotic Product (PROBIOTIC ADVANCED PO), Take 1 capsule by mouth every morning   propranolol ER (INDERAL LA) 60 MG 24 hr capsule, TAKE 1 CAPSULE EVERY DAY (Patient taking differently: Take 60 mg by mouth every evening TAKE 1 CAPSULE EVERY DAY)  rOPINIRole (REQUIP) 0.25 MG tablet, 0.25 mg in the afternoon and 0.5 mg at night (Patient taking differently: Take by mouth 2 times daily 0.25 mg in the afternoon and 0.5 mg at night)  sertraline (ZOLOFT) 100 MG tablet, TAKE 1 TABLET (100 MG) BY MOUTH EVERY MORNING  traZODone (DESYREL) 50 MG tablet, TAKE 1/2 TABLET AT BEDTIME (Patient taking differently: Take 50 mg by mouth At Bedtime )    No current facility-administered medications on file prior to visit.       ROS: 10 point ROS neg other than the symptoms noted above in the HPI.  Vital signs:   BP (!) 156/65   Pulse 61   Ht 1.461 m (4' 9.5\")   Wt 74.3 kg (163 lb 14.4 oz)   BMI 34.85 kg/m     Estimated body mass index is 34.85 kg/m  as calculated from the following:    Height as of this encounter: 1.461 m (4' 9.5\").    Weight as of this encounter: 74.3 kg (163 lb 14.4 oz).    VSS  NAD  Eyes - no periorbital edema, conjunctival injection, scleral icterus  Neck - thyroid not palpable today.  No LN  Lungs - clear  CV - RRR..   Neuro - DTR 2/4 biceps  Skin - normal texture   Mood - not depressed    EXAMINATION: US THYROID, 11/26/2018 1:09 PM      COMPARISON: 12/18/2017     HISTORY: Nontoxic multinodular goiter.     Technique: Grayscale and color ultrasound imaging of the thyroid was  performed.     Findings:    Thyroid parenchyma: Heterogeneous with innumerable nodules, similar in  appearance to 12/18/2017. There remains coarse and dystrophic  appearing calcifications on the left. Multiple punctate echogenic foci  seen. There are also punctate echogenic foci likely representing  inspissated colloid as well as " foci that could represent  microcalcifications, unchanged. Multiple nodules demonstrate  hypervascularity, stable.     The right lobe of the thyroid measures: 4.4 x 3.2 x 5.1 cm     The thyroid isthmus measures: 1.6 cm     The left lobe of the thyroid measures: 2.7 x 3 x 5.4 cm                                                                       Impression:  Enlarged heterogeneous thyroid gland with innumerable nodules which  are solid and cystic. There are coarse calcifications as well as  echogenic punctate foci likely inspissated colloid with possible  microcalcifications. No significant interval change from prior  ultrasound 12/18/2017.     I have personally reviewed the examination and initial interpretation  and I agree with the findings.     CANDIS ESPAÑA MD    Assessment and plan:    This 86-year-old woman with a history of Graves' disease now appears to be well controlled on 5 mg of methimazole each day.  I will repeat her thyroid function test today as well as a TSI.  It was elevated last fall when it was measured.  We once again reviewed the appropriate treatment for Graves' disease.  For now we will continue the methimazole.  Given her other co morbidities, I do not want to take her off the drug in order to give her radioactive iodine and risk having hyperthyroid symptoms again.    Her thyroid ultrasound done in 2018 shows she has microcalcifications  that could be due to thyroid cancer.  Given her other health problems, we have elected not to pursue this diagnosis.  However, after her knee replacement and treatment for her bladder cancer, we may want to reconsider this in the future.  I explained this to her.  She says she understands that she may have thyroid  in her cancer but additional work-up for this right now may not be in her best interest.    Follow-up in 6 months.    Dhara Meza MD                Again, thank you for allowing me to participate in the care of your patient.       Sincerely,    Dhara Meza MD

## 2020-01-28 NOTE — TELEPHONE ENCOUNTER
1/28/20    Pt called regarding addition of pharmacy appointment to her PAC appointment scheduled for January 30th.  Message left for Pt that appointment would now begin at 1300 with a 1245 check in time.  Pt instructed to call PAC clinic back if this appointment time would not work.

## 2020-01-28 NOTE — TELEPHONE ENCOUNTER
Chief Complaint : post op Bladder biopsy     Records/Orders: pathology     Pt Contacted: no    At Rooming: print path

## 2020-01-28 NOTE — PROGRESS NOTES
This 86 year old woman is here for f/u of Graves.  She first developed symptoms of hyperthyroidism in December 2017.  She was most troubled by the tremor that occurred.  She had not noticed change in her skin or hair.  She saw Dr. Rojas in the community and he started her on methimazole.  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  Because she had received an iodine dye load when she had a coronary angiogram, we were unable to do a radioiodine scan and uptake to determine the precise cause of her hyperthyroidism.  She was maintained on methimazole until June 2017 when it was stopped.  A radioiodine uptake and scan was done in July, showed an uptake of ~ 70%, and confirmed she had Graves' disease.  She was restarted on methimazole in July 2018 and we have been adjusting the dose since then.  She has been taking 5 mg every AM every day.      Today she is here with her daughter in law.  She reports she feels well.  Her energy level has improved.  Her appetite is fine.  She has no problems with temperature tolerance.  She occasionally has some tremor but this is much better than it was in the past.  She said no change in her vision.  She is occasionally constipated.  She is noted no change in her hair or skin.  She feels like she is back to where she felt 3 years ago before she became ill.       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 She will likely be undergoing chemo again.    Her right knee has become very painful and she is planning to have knee replacement in February.  She had a cortisone shot in the fall that helped, but the pain is backed.  She isn't moving as much as she wants to and is concerned about weight gain.    Her blood pressure is checked regularly at her facility and she reports is always much better controlled than  "today.    acetaminophen (TYLENOL) 650 MG CR tablet, Take 1 tablet (650 mg) by mouth every 8 hours as needed for pain (Patient taking differently: Take 650 mg by mouth every 8 hours as needed for pain (PT last dose 1.10.2020) )  alendronate (FOSAMAX) 70 MG tablet, TAKE 1 TABLET EVERY 7 DAYS AT LEAST 60 MIN BEFORE BREAKFAST AS DIRECTED \"SEE PACKAGE FOR ADDITIONAL INSTRUCTIONS\" (Patient taking differently: Take 70 mg by mouth every 30 days TAKE 1 TABLET EVERY 7 DAYS AT LEAST 60 MIN BEFORE BREAKFAST AS DIRECTED \"SEE PACKAGE FOR ADDITIONAL INSTRUCTIONS\")  aspirin 81 MG tablet, Take 81 mg by mouth every evening   Calcium Citrate-Vitamin D (CALCIUM + D PO), Take 1 tablet by mouth 2 times daily   cycloSPORINE (RESTASIS) 0.05 % ophthalmic emulsion, Place 1 drop into both eyes 2 times daily  ferrous sulfate 325 (65 Fe) MG TBEC EC tablet, Take 325 mg by mouth every morning   furosemide (LASIX) 20 MG tablet, Take 1 tablet (20 mg) by mouth every morning  irbesartan (AVAPRO) 300 MG tablet, TAKE 1 TABLET EVERY DAY (Patient taking differently: Take 300 mg by mouth every morning TAKE 1 TABLET EVERY DAY)  lovastatin (MEVACOR) 40 MG tablet, TAKE 1 TABLET AT BEDTIME (HYPERLIPIDEMIA LDL GOAL BELOW 130)  methimazole (TAPAZOLE) 5 MG tablet, 10 mg alternating with 15 mg every other day (Patient taking differently: Take 5 mg by mouth every morning )  nitroGLYcerin (NITROSTAT) 0.4 MG sublingual tablet, For chest pain place 1 tablet under the tongue every 5 minutes for 3 doses. If symptoms persist 5 minutes after 1st dose call 911.  Omega-3 Fatty Acids (FISH OIL) 500 MG CAPS, Take 1 capsule by mouth 2 times daily   omeprazole (PRILOSEC) 20 MG DR capsule, TAKE 1 CAPSULE EVERY DAY (Patient taking differently: Take 20 mg by mouth every morning TAKE 1 CAPSULE EVERY DAY)  Probiotic Product (PROBIOTIC ADVANCED PO), Take 1 capsule by mouth every morning   propranolol ER (INDERAL LA) 60 MG 24 hr capsule, TAKE 1 CAPSULE EVERY DAY (Patient taking " "differently: Take 60 mg by mouth every evening TAKE 1 CAPSULE EVERY DAY)  rOPINIRole (REQUIP) 0.25 MG tablet, 0.25 mg in the afternoon and 0.5 mg at night (Patient taking differently: Take by mouth 2 times daily 0.25 mg in the afternoon and 0.5 mg at night)  sertraline (ZOLOFT) 100 MG tablet, TAKE 1 TABLET (100 MG) BY MOUTH EVERY MORNING  traZODone (DESYREL) 50 MG tablet, TAKE 1/2 TABLET AT BEDTIME (Patient taking differently: Take 50 mg by mouth At Bedtime )    No current facility-administered medications on file prior to visit.       ROS: 10 point ROS neg other than the symptoms noted above in the HPI.  Vital signs:   BP (!) 156/65   Pulse 61   Ht 1.461 m (4' 9.5\")   Wt 74.3 kg (163 lb 14.4 oz)   BMI 34.85 kg/m    Estimated body mass index is 34.85 kg/m  as calculated from the following:    Height as of this encounter: 1.461 m (4' 9.5\").    Weight as of this encounter: 74.3 kg (163 lb 14.4 oz).    VSS  NAD  Eyes - no periorbital edema, conjunctival injection, scleral icterus  Neck - thyroid not palpable today.  No LN  Lungs - clear  CV - RRR..   Neuro - DTR 2/4 biceps  Skin - normal texture   Mood - not depressed    EXAMINATION: US THYROID, 11/26/2018 1:09 PM      COMPARISON: 12/18/2017     HISTORY: Nontoxic multinodular goiter.     Technique: Grayscale and color ultrasound imaging of the thyroid was  performed.     Findings:    Thyroid parenchyma: Heterogeneous with innumerable nodules, similar in  appearance to 12/18/2017. There remains coarse and dystrophic  appearing calcifications on the left. Multiple punctate echogenic foci  seen. There are also punctate echogenic foci likely representing  inspissated colloid as well as foci that could represent  microcalcifications, unchanged. Multiple nodules demonstrate  hypervascularity, stable.     The right lobe of the thyroid measures: 4.4 x 3.2 x 5.1 cm     The thyroid isthmus measures: 1.6 cm     The left lobe of the thyroid measures: 2.7 x 3 x 5.4 cm           "                                                             Impression:  Enlarged heterogeneous thyroid gland with innumerable nodules which  are solid and cystic. There are coarse calcifications as well as  echogenic punctate foci likely inspissated colloid with possible  microcalcifications. No significant interval change from prior  ultrasound 12/18/2017.     I have personally reviewed the examination and initial interpretation  and I agree with the findings.     CANDIS ESPAÑA MD    Assessment and plan:    This 86-year-old woman with a history of Graves' disease now appears to be well controlled on 5 mg of methimazole each day.  I will repeat her thyroid function test today as well as a TSI.  It was elevated last fall when it was measured.  We once again reviewed the appropriate treatment for Graves' disease.  For now we will continue the methimazole.  Given her other co morbidities, I do not want to take her off the drug in order to give her radioactive iodine and risk having hyperthyroid symptoms again.    Her thyroid ultrasound done in 2018 shows she has microcalcifications  that could be due to thyroid cancer.  Given her other health problems, we have elected not to pursue this diagnosis.  However, after her knee replacement and treatment for her bladder cancer, we may want to reconsider this in the future.  I explained this to her.  She says she understands that she may have thyroid  in her cancer but additional work-up for this right now may not be in her best interest.    Follow-up in 6 months.    Dhara Meza MD

## 2020-01-30 ENCOUNTER — ANESTHESIA EVENT (OUTPATIENT)
Dept: SURGERY | Facility: CLINIC | Age: 85
End: 2020-01-30

## 2020-01-30 ENCOUNTER — OFFICE VISIT (OUTPATIENT)
Dept: SURGERY | Facility: CLINIC | Age: 85
End: 2020-01-30
Payer: MEDICARE

## 2020-01-30 ENCOUNTER — OFFICE VISIT (OUTPATIENT)
Dept: UROLOGY | Facility: CLINIC | Age: 85
End: 2020-01-30
Payer: MEDICARE

## 2020-01-30 VITALS
DIASTOLIC BLOOD PRESSURE: 57 MMHG | HEIGHT: 58 IN | SYSTOLIC BLOOD PRESSURE: 134 MMHG | BODY MASS INDEX: 34.63 KG/M2 | WEIGHT: 165 LBS | HEART RATE: 52 BPM

## 2020-01-30 VITALS
WEIGHT: 165 LBS | OXYGEN SATURATION: 98 % | TEMPERATURE: 98.5 F | HEART RATE: 52 BPM | HEIGHT: 58 IN | SYSTOLIC BLOOD PRESSURE: 134 MMHG | BODY MASS INDEX: 34.63 KG/M2 | DIASTOLIC BLOOD PRESSURE: 57 MMHG | RESPIRATION RATE: 12 BRPM

## 2020-01-30 DIAGNOSIS — C68.9 UROTHELIAL CANCER (H): Primary | ICD-10-CM

## 2020-01-30 DIAGNOSIS — I35.1 AORTIC VALVE INSUFFICIENCY, ETIOLOGY OF CARDIAC VALVE DISEASE UNSPECIFIED: ICD-10-CM

## 2020-01-30 DIAGNOSIS — I35.0 AORTIC STENOSIS, MODERATE: ICD-10-CM

## 2020-01-30 DIAGNOSIS — Z01.818 PREOP EXAMINATION: Primary | ICD-10-CM

## 2020-01-30 DIAGNOSIS — M17.11 PRIMARY OSTEOARTHRITIS OF RIGHT KNEE: ICD-10-CM

## 2020-01-30 LAB
MRSA DNA SPEC QL NAA+PROBE: NEGATIVE
SPECIMEN SOURCE: NORMAL
TSI SER-ACNC: 3.2 TSI INDEX

## 2020-01-30 PROCEDURE — 87641 MR-STAPH DNA AMP PROBE: CPT | Performed by: PHYSICIAN ASSISTANT

## 2020-01-30 PROCEDURE — 87640 STAPH A DNA AMP PROBE: CPT | Mod: XU | Performed by: PHYSICIAN ASSISTANT

## 2020-01-30 PROCEDURE — 40000830 ZZHCL STATISTIC STAPH AUREUS METH RESIST SCREEN PCR: Performed by: PHYSICIAN ASSISTANT

## 2020-01-30 ASSESSMENT — ENCOUNTER SYMPTOMS
SEIZURES: 0
DYSRHYTHMIAS: 1

## 2020-01-30 ASSESSMENT — MIFFLIN-ST. JEOR
SCORE: 1074.38
SCORE: 1074.32

## 2020-01-30 ASSESSMENT — LIFESTYLE VARIABLES: TOBACCO_USE: 0

## 2020-01-30 ASSESSMENT — PAIN SCALES - GENERAL
PAINLEVEL: MODERATE PAIN (5)
PAINLEVEL: NO PAIN (0)

## 2020-01-30 NOTE — PATIENT INSTRUCTIONS
Preparing for Your Surgery      Name:  Dimple Oviedo   MRN:  [id number]   :  1933   Today's Date:  2020     Arriving for surgery:  Surgery date:  20  Arrival time:  5:30 am    Please come to:     [facility] [address]  [address] [place]  [address]    - parking is available in front of [facility] from 5:15AM to 8:00PM. If you prefer, park your car in the Green Lot.    -Proceed to the 3rd floor, check in at the Adult Surgery Waiting Lounge. [phone]    If an escort is needed stop at the Information Desk in the lobby. Inform the information person that you are here for surgery. An escort to the Adult Surgery Waiting Lounge will be provided.    -  Bring your ID and insurance card.    Enhanced Recovery After Surgery     This is a team effort, including you, to get you back on your feet, eating and drinking normally and out of the hospital as quickly as possible.  The goals are: 1) NO INFECTIONS and   2) RETURN TO NORMAL DIET    How can we achieve these goals?  1) STAY ACTIVE: Walk every day before your surgery; try to increase the amount every day.  Walk after surgery as much as you can-the nurses will help you.  Walking speeds healing and gets you home quicker, you heal better at home and have less risk of infection.     2) INCENTIVE SPIROMETER: -  Begin using Incentive Spirometer 1 week prior to surgery.  Use 4 times per day, up to 5-10 breaths each time.  Bring Incentive Spirometer to hospital.   Using the incentive spirometer can strengthen your muscles between your ribs and help you have a strong cough after surgery.  A more effective cough can help prevent problems with your lungs.    3) STAY HYDRATED: Drink clear liquids up until 2 hours before your surgery. We would like you to purchase a drink such as Gatorade or Ensure Clear (not the milkshake type).  Drink this before bedtime and on the way into the hospital prior to 5 am,  drink between 8-10 ounces or until you feel hydrated.  Keeping well hydrated leads to your veins being plump, you wake up faster, and you are less likely to be nauseated. Start drinking water as soon as you can after surgery and advance to clear liquids and food as tolerated.  IV fluids contain salt, drinking fluids will minimize the amount of IV fluids you need and decrease the amount of salt you get.    The most common reason for the patient to be readmitted is dehydration. Staying hydrated after you go home from the hospital is very important.  Ensure or Ensure Clear are good options to keep you hydrated.     4) PAIN MANAGEMENT: If we minimize the amount of opioids and narcotics, and use regional blocks (which numb the area where your surgery is) along with oral pain medications; you will have less side effects of nausea and constipation. Narcotics can slow down your bowels and cause you to stay in the hospital longer.     Our goal is to keep you comfortable; eating and drinking normally and back home safely.     What can I eat or drink?  -  You may have solid food or milk products until 8 hours prior to your surgery. (Until 11 pm 2-16-20 )  -  You may have water, apple juice or 7up/Sprite until 2 hours prior to your surgery. (Until 5 am )    Which medicines can I take?       -  Do not bring your own medications to the hospital, except for eye drops.        -  Follow Orthopedic Clinic instructions regarding Ibuprofen. If no instructions given, NO Ibuprofen the day prior to surgery.            -  Hold Naproxen (Aleve) 4 days prior to surgery.    -  Hold Aspirin and Fish Oil for 7 days prior to surgery. Last dose 2-9-20.    -  Do NOT take these medications in the morning, the day of surgery:  Furosemide (Lasix), Irbesartan (Avapro), Fosamax, Ferrous Sulfate, Calcium Citrate-Vitamin D, Probiotic,     -  Please take these medications the morning of surgery:  Cyclosporine (Restasis) eye drops, Methimazole (Tapazole),  Omeprazole, Propranolol ER (Inderal LA), Sertraline (Zoloft),  Acetaminophen (Tylenol) if needed    How do I prepare myself?  -  Take two showers: one the night before surgery; and one the morning of surgery.         Use Scrubcare or Hibiclens to wash from neck down.  You may use your own shampoo and conditioner. No other hair products.   -  Do NOT use lotion, powder, colognes, deodorant, or antiperspirant the day of your surgery.  -  Do NOT wear any makeup, fingernail polish or jewelry.    Questions or Concerns:  If you have questions or concerns prior to your surgery, call [phone]. (Mon - Fri   8 am- 5:30 pm)  If your surgery is at the Ambulatory Surgery Center, please call [phone]. (Mon - Fri 8 am- 3:30 pm)  Questions about surgery, contact your Surgeons office.  If you have any health changes prior to surgery, please notify your Surgeon.    AFTER YOUR SURGERY  Breathing exercises   Breathing exercises help you recover faster. Take deep breaths and let the air out slowly. This will:     Help you wake up after surgery.    Help prevent complications like pneumonia.  Preventing complications will help you go home sooner.   Nausea and vomiting   You may feel sick to your stomach after surgery; if so, let your nurse know.    Pain control:  After surgery, you may have pain. Our goal is to help you manage your pain. Pain medicine will help you feel comfortable enough to do activities that will help you heal.  These activities may include breathing exercises, walking and physical therapy.   To help your health care team treat your pain we will ask: 1) If you have pain  2) where it is located 3) describe your pain in your words  Methods of pain control include medications given by mouth, vein or by nerve block for some surgeries.  Sequential Compression Device (SCD) or Pneumo Boots:  You may need to wear SCD S on your legs or feet. These are wraps connected to a machine that pumps in air and releases it. The  repeated pumping helps prevent blood clots from forming.     Orthopedic request for AM ADMIT Patients:   If you are a patient staying for more than one night in the hospital, your caregiver should plan to arrive by 8:30am on the day you are going home. This will ensure your caregiver/ will be present for your morning cares, as well as be a part of your discharge conversation. Oftentimes, our morning discharges leave before lunch.    Using an Incentive Spirometer    An incentive spirometer is a device that helps you do deep breathing exercises. These exercises expand your lungs, aid in circulation, and help prevent pneumonia. Deep breathing exercises also help you breathe better and improve the function of your lungs by:    Keeping your lungs clear    Strengthening your breathing muscles    Helping prevent respiratory complications or problems  The incentive spirometer gives you a way to take an active part in your care. A nurse or therapist will teach you breathing exercises. To do these exercises, you will breathe in through your mouth and not your nose. The incentive spirometer only works correctly if you breathe in through your mouth.    Steps to clear lungs  Step 1. Exhale normally. Then, inhale normally.    Relax and breathe out.  Step 2. Place your lips tightly around the mouthpiece.    Make sure the device is upright and not tilted.  Step 3. Inhale as much air as you can through the mouthpiece (don't breath through your nose).    Inhale slowly and deeply.    Hold your breath long enough to keep the balls or disk raised for at least 3 to 5 seconds, or as instructed by your healthcare provider.  Step 4. Repeat the exercise regularly.

## 2020-01-30 NOTE — PATIENT INSTRUCTIONS
Set up BCG.      It was a pleasure meeting with you today.  Thank you for allowing me and my team the privilege of caring for you today.  YOU are the reason we are here, and I truly hope we provided you with the excellent service you deserve.  Please let us know if there is anything else we can do for you so that we can be sure you are leaving completely satisfied with your care experience.

## 2020-01-30 NOTE — LETTER
2020     RE: Dimple Oviedo  [address]  [facility] [address]     Dear Colleague,    Thank you for referring your patient, Dimple Oviedo, to the [facility] UROLOGY AND INST FOR PROSTATE AND UROLOGIC CANCERS at [facility]. Please see a copy of my visit note below.      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 She completed chemotherapy (gem/carbo).  Stable to no evidence of disease in the upper tracts.    2. 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       Discussed that this type of cancer is very susceptible to drop metastasis and the bladder tumor is likely seeded from the kidney tumor. We will continue to monitor the other kidney.     We will proceed with intravesical therapy, which is typically well tolerated but can cause symptoms of a UTI with later doses. We do not want to administer BCG while she is recovering from knee surgery and would not want to do knee surgery while she is receiving BCG. If she has a complication from surgery then we may need to delay BCG. She would like to delay her knee surgery and proceed with BCG in mid-February. Discussed that response rate to BCG is about 70% after one course and 80% after two courses.     - Stop baby aspirin for a week to allow for bladder healing. If hematuria persists, we would need to delay BCG.   - BCG x 6 in mid-February   - Follow-up in 3 months with cystoscopy, cytology and FISH    Attestation:  This patient was seen and evaluated by me, with a scribe taking notes.  I have reviewed the  "note above and agree.  The physical exam and or any procedures were performed by me and the pertinant details are outlined below.       Stuart King MD  Department of Urology  [facility]    I spent over 25 minutes with the patient.  Over half this time was spent on counseling.    ______________________________________________________________________    HPI  Dimple Oviedo is a 86 year old female with a history of high grade upper tract urothelial cancer (pT4N1) here for follow up after right nephroureterectomy on 18. The patient is following yolanda Toledo of Hematology and Oncology.     Per Dr. Toledo's note, on 18 Mr. Oviedo- Started adjuvant chemotherapy (carbo+gem) per POUT trial. Cycle 2 - 19. Cycle 3 - 19. Cycle 4 - 19.    Date of last abdominal and pelvis imagin2019   Date of last chest imagin2019   Any recurrence? cystoscopy on 10/03/2019 showed small area of erythema. Bladder biopsy from 2020 showed urothelial carcinoma in-situ     Today she reports that she has intermittently been passing clots. Last clot was yesterday but urine was clear today. She uses baby aspirin.     She has right total knee arthoplasty planned for 2020. She has knee pain but is not crippled by the pain.     Review of Systems:   Pertinent items are noted in HPI or as below, remainder of complete ROS is negative.      Physical Exam:   Patient is a 86 year old  female   Vitals: Blood pressure 134/57, pulse 52, height 1.467 m (4' 9.76\"), weight 74.8 kg (165 lb), not currently breastfeeding.  General Appearance Adult: Alert, no acute distress, oriented  HENT: throat/mouth:normal, good dentition  Lungs: no respiratory distress, or pursed lip breathing  Heart: No obvious jugular venous distension present  Abdomen: Body mass index is 34.77 kg/m .  Musculoskeltal: extremities normal  Skin: no visible suspicious lesions or rashes  Neuro: Alert, oriented, speech and mentation " normal  Psych: affect and mood normal  Gait: Normal  : deferred    Laboratory:   I reviewed all applicable laboratory and pathology data and went over findings with patient  Significant for     Lab Results   Component Value Date    CR 1.19 01/10/2020    CR 1.09 12/04/2019    CR 0.96 10/04/2019    CR 1.03 09/11/2019    CR 0.99 06/12/2019    CR 1.02 03/06/2019    CR 1.00 02/13/2019    CR 1.18 02/11/2019    CR 0.98 02/07/2019    CR 0.99 02/03/2019       Results for orders placed or performed during the hospital encounter of 12/26/18   UA with Microscopic   Result Value Ref Range    Color Urine Light Yellow     Appearance Urine Clear     Glucose Urine Negative NEG^Negative mg/dL    Bilirubin Urine Negative NEG^Negative    Ketones Urine Negative NEG^Negative mg/dL    Specific Gravity Urine 1.008 1.003 - 1.035    Blood Urine Trace (A) NEG^Negative    pH Urine 5.5 5.0 - 7.0 pH    Protein Albumin Urine Negative NEG^Negative mg/dL    Urobilinogen mg/dL Normal 0.0 - 2.0 mg/dL    Nitrite Urine Negative NEG^Negative    Leukocyte Esterase Urine Negative NEG^Negative    Source Catheterized Urine     WBC Urine 0 0 - 5 /HPF    RBC Urine 0 0 - 2 /HPF       Imaging:   I personally viewed all applicable images, interpreted them, and discussed findings with the patient.     Results for orders placed or performed in visit on 12/04/19   CT Chest Abdomen Pelvis w/o Contrast    Narrative    CT CHEST, ABDOMEN AND PELVIS WITHOUT CONTRAST 12/4/2019 1:58 PM     HISTORY: Upper tract urothelial cancer status post resection and 4  cycles of adjuvant chemo. Now on surveillance. 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     COMPARISON: CT chest, abdomen and pelvis 9/11/2019.    TECHNIQUE: Axial images from the thoracic inlet to the symphysis are  performed with additional coronal reformatted images. No contrast is  utilized. Radiation dose for this scan was reduced using automated  exposure control, adjustment of the mA and/or kV  according to patient  size, or iterative reconstruction technique.    FINDINGS:     Chest: Scattered tiny indeterminate lung nodules are stable. No new or  enlarging lung lesions. Enlarged thyroid gland with cysts and/or  nodules appears stable in size. Aorta demonstrates scattered calcified  plaque. No definite evidence of aneurysm. Hiatal hernia is present.    Abdomen: Prior cholecystectomy changes. No evidence of fatty liver  infiltration. The pancreas and adrenal glands are unremarkable. Prior  right nephrectomy. No recurrent soft tissue in the surgical bed. A  nonobstructing 0.4 cm stone is present in the left renal collecting  system which is stable. No hydronephrosis. No enlarged lymph nodes.  Probable colonic constipation without obstruction or diverticulitis.  Appendix is normal. 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 No interval change.    Pelvis: The bladder, uterus, adnexal regions and rectum are  unremarkable. No enlarged pelvic lymph nodes. No free pelvic fluid.  Degenerative spine changes are present. No destructive bone changes  are evident.      Impression    IMPRESSION: No acute change since prior exam. No evidence of recurrent  or metastatic disease. Tiny lung nodules are stable. Prior right  nephrectomy. Left renal artery aneurysm is stable.    ADELAIDE TORRES MD     *Note: Due to a large number of results and/or encounters for the requested time period, some results have not been displayed. A complete set of results can be found in Results Review.       Scribe Disclosure:  I, Nikkie Walker, am serving as a scribe to document services personally performed by Stuart King MD at this visit, based upon the provider's statements to me. All documentation has been reviewed by the aforementioned provider prior to being entered into the official medical record.    Again, thank you for allowing me to participate in the care of your patient.       Sincerely,    Stuart King MD

## 2020-01-30 NOTE — ANESTHESIA PREPROCEDURE EVALUATION
Anesthesia Pre-Procedure Evaluation    Patient: Dimple Oviedo   MRN:     [id number] Gender:   female   Age:    86 year old :      1933        Preoperative Diagnosis: * No surgery found *        Past Medical History:   Diagnosis Date     Calculus of kidney      Chemotherapy-induced neutropenia (H) 3/6/2019     Esophageal reflux      GERD (gastroesophageal reflux disease)      Hyperlipidemia LDL goal <130 2010     Malignant melanoma of skin of trunk, except scrotum (H)      Nonspecific abnormal finding     has living will  -      Nontoxic multinodular goiter     no further eval /tx rec per pt     Osteopenia      Other psoriasis      Personal history of colonic polyps      PMR (polymyalgia rheumatica) (H)      Stress-induced cardiomyopathy      Undiagnosed cardiac murmurs      Unspecified constipation      Unspecified essential hypertension      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      Past Surgical History:   Procedure Laterality Date     BIOPSY       C NONSPECIFIC PROCEDURE      colonoscopy polyp repeat      COLONOSCOPY       COMBINED CYSTOSCOPY, INSERT STENT URETER(S) Bilateral 2018    Procedure: COMBINED CYSTOSCOPY, INSERT STENT URETER(S);  Cystoscopy Bilateral ureteral Stent Placement.;  Surgeon: Justin Henry MD;  [facility]: [facility]     COMBINED CYSTOSCOPY, RETROGRADES, URETEROSCOPY, INSERT STENT Bilateral 4/3/2018    Procedure: COMBINED CYSTOSCOPY, RETROGRADES, URETEROSCOPY, INSERT STENT;;  Surgeon: Stuart King MD;  [facility]     COMBINED CYSTOSCOPY, RETROGRADES, URETEROSCOPY, INSERT STENT Bilateral 9/10/2018    Procedure: COMBINED CYSTOSCOPY, RETROGRADES, URETEROSCOPY, INSERT STENT;  Cystoscopy, Bilateral Ureteroscopy, Bladder Biopsies, Retrogram Pyelograms, Ureteral Washings and brushings, cysview;  Surgeon: Stuart King MD;  [facility]     CYSTOSCOPY, BIOPSY BLADDER INSTILL OPTICAL AGENT N/A 4/3/2018    Procedure: CYSTOSCOPY, BIOPSY  BLADDER INSTILL OPTICAL AGENT;  Cystoscopy, Blue Light Cystoscopy, Bladder Biopsies, Bilateral Selective ureteral washings for Cytology, Bilateral Retrograde Pyelograms, Bilateral Ureteroscopy;  Surgeon: Stuart King MD;  [facility]     CYSTOSCOPY, BIOPSY BLADDER, COMBINED N/A 2/19/2018    Procedure: COMBINED CYSTOSCOPY, BIOPSY BLADDER;  Cystoscopy, Bladder Biopsy;  Surgeon: Kenna La MD;  [facility]: [facility]     CYSTOSCOPY, FULGURATE BLADDER TUMOR, COMBINED N/A 1/13/2020    Procedure: CYSTOSCOPY, WITH  bladder biopsies and fulguration;  Surgeon: Stuart King MD;  [facility]     CYSTOSCOPY, REMOVE STENT(S), COMBINED  11/13/2018    Procedure: Flexible Cystoscopy with Stent Removal;  Surgeon: Stuart King MD;  [facility]     DAVINCI LYMPHADENECTOMY N/A 11/13/2018    Procedure: Davinci Lymphadenectomy ;  Surgeon: Stuart King MD;  [facility]     DAVINCI NEPHROURETERECTOMY N/A 11/13/2018    Procedure: Right DaVinci Assisted Nephroureterectomy;  Surgeon: Stuart King MD;  [facility]     ENDOSCOPIC ULTRASOUND LOWER GASTROINTESTIONAL TRACT (GI) N/A 10/30/2015    Procedure: ENDOSCOPIC ULTRASOUND LOWER GASTROINTESTIONAL TRACT (GI);  Surgeon: Daniel Jean-Baptiste MD;  Location: [facility]     EYE SURGERY  12/4/17     INSERT PORT VASCULAR ACCESS Right 12/19/2018    Procedure: Chest Port Placement - right;  Surgeon: Stuart Chavez PA-C;  [facility]     IR CHEST PORT PLACEMENT > 5 YRS OF AGE  12/19/2018     IR PORT REMOVAL RIGHT  6/26/2019     LAPAROSCOPIC CHOLECYSTECTOMY WITH CHOLANGIOGRAMS N/A 11/1/2015    Procedure: LAPAROSCOPIC CHOLECYSTECTOMY WITH CHOLANGIOGRAMS;  Surgeon: Tonie Warren MD;  [facility]     REMOVE PORT VASCULAR ACCESS Right 6/26/2019    Procedure: Right Port Removal;  Surgeon: Froilan Irizarry PA-C;  [facility]     SURGICAL HISTORY OF -   1996    malignant melanoma      SURGICAL HISTORY OF -   1968    thyroid nodule     SURGICAL HISTORY OF -       D & C          Anesthesia Evaluation     . Pt has had prior anesthetic. Type: General    History of anesthetic complications   - PONV        ROS/MED HX    ENT/Pulmonary:     (+)JESS risk factors snores loudly, hypertension, , . .   (-) tobacco use and asthma   Neurologic:     (+)other neuro tremors, RLS   (-) seizures and Neuropathy   Cardiovascular: Comment:  Stress induced cardiomyopathy EF 35%. Angiogram no CAD. F/U ECHO Chaitanya EF 65%.   AF with RVR in setting of hyperthyroidism. Spontaneous conversion. Managed with B-blocker, ACEI, statin and ASA.   Xarelto DC'd 2/2 bleeding.   Chronic bilateral lower extremity lymphedema.        (+) Dyslipidemia, hypertension----. : . CHF etiology: Stress induced cardiomyopathy 12/13/17 Last EF: 55-60% date: 9/2018 . . :. dysrhythmias a-fib, valvular problems/murmurs type: AS and AI mild to moderate on echo 2018:. Previous cardiac testing Echodate:9/2018results:Interpretation Summary  Left ventricular function, chamber size, wall motion, and wall thickness are  normal.The EF is 55-60%.  Right ventricular function, chamber size, wall motion, and thickness are  normal.  Severe left atrial enlargement is present.  Aortic valve poorly visualized but appears to have moderate calcification and  at least mild-moderate stenosis.  Mild to moderate aortic insufficiency is present.  IVC measures 2.47cm and collapses with inspiration. Estimated mean right  atrial pressure is 8 mmHg (mildly elevated).  No pericardial effusion is present.date: results:ECG reviewed date:2/3/19 results:SR, PACs, LADCat date: 12/2017 results:         (-) taking anticoagulants/antiplatelets   METS/Exercise Tolerance: Comment: Recently traveled to [place] to visit family. Able to walk one block w/ cane. Activity limited due to knee pain. 3 - Able to walk 1-2 blocks without stopping   Hematologic: Comments: Taking ferrous sulfate.     (+) Anemia, -     (-) History of Transfusion   Musculoskeletal: Comment: Osteopenia    Osteoarthritis in knees bilaterally. Right hip pain. Lumbar stenosis, LBP.  (+) arthritis,  -       GI/Hepatic:     (+) GERD Asymptomatic on medication, cholecystitis/cholelithiasis (s/p cholecystectomy),       Renal/Genitourinary: Comment: S/P nephroureterectomy 11/2018    (+) chronic renal disease, type: CRI, Nephrolithiasis , Pt does not require dialysis, Pt has no history of transplant, Other Renal/ Genitourinary, Hematuria      Endo:     (+) thyroid problem (Graves disease)  hyperthyroidism, Obesity, .      Psychiatric:     (+) psychiatric history depression      Infectious Disease:  - neg infectious disease ROS       Malignancy:   (+) Malignancy History of Other  Skin CA status post Surgery, Other CA Urothelial cancer Active status post Surgery and Chemo         Other:    (+) C-spine cleared: N/A, no H/O Chronic Pain,no other significant disability                        PHYSICAL EXAM:   Mental Status/Neuro: A/A/O; Age Appropriate   Airway: Facies: Feasible  Mallampati: III  Mouth/Opening: Full  TM distance: > 6 cm  Neck ROM: Limited   Respiratory: Auscultation: CTAB     Resp. Rate: Normal     Resp. Effort: Normal      CV: Rhythm: Regular  Rate: Age appropriate  Heart: Murmur (Systolic; soft)  Edema: RLE; LLE   Comments:      Dental: Normal Dentition                LABS:  CBC:   Lab Results   Component Value Date    WBC 7.7 01/10/2020    WBC 6.8 12/04/2019    HGB 13.2 01/10/2020    HGB 12.6 12/04/2019    HCT 41.0 01/10/2020    HCT 38.8 12/04/2019     01/10/2020     12/04/2019     BMP:   Lab Results   Component Value Date     01/10/2020     12/04/2019    POTASSIUM 4.2 01/10/2020    POTASSIUM 4.7 12/04/2019    CHLORIDE 101 01/10/2020    CHLORIDE 103 12/04/2019    CO2 31 01/10/2020    CO2 29 12/04/2019    BUN 34 (H) 01/10/2020    BUN 28 12/04/2019    CR 1.19 (H) 01/10/2020    CR 1.09 (H) 12/04/2019  "   GLC 83 01/10/2020     (H) 12/04/2019     COAGS:   Lab Results   Component Value Date    PTT 27 02/07/2019    INR 0.99 06/26/2019     POC:   Lab Results   Component Value Date    BGM 99 12/11/2018    HCGS Negative 12/13/2017     OTHER:   Lab Results   Component Value Date    LACT 1.0 12/13/2017    A1C 6.2 (H) 12/14/2017    SHREYA 9.3 01/10/2020    PHOS 2.4 (L) 12/13/2017    MAG 1.8 02/03/2019    ALBUMIN 3.6 01/10/2020    PROTTOTAL 7.8 01/10/2020    ALT 26 01/10/2020    AST 15 01/10/2020    ALKPHOS 112 01/10/2020    BILITOTAL 0.4 01/10/2020    LIPASE 91 12/26/2018    AMYLASE 44 10/29/2015    TSH 1.43 01/28/2020    T4 0.80 01/28/2020    T3 95 01/28/2020    CRP <2.9 10/06/2017    SED 25 10/06/2017        Preop Vitals    BP Readings from Last 3 Encounters:   01/30/20 134/57   01/28/20 (!) 156/65   01/13/20 (!) 140/62    Pulse Readings from Last 3 Encounters:   01/30/20 52   01/28/20 61   01/13/20 50      Resp Readings from Last 3 Encounters:   01/30/20 12   01/13/20 16   01/10/20 15    SpO2 Readings from Last 3 Encounters:   01/30/20 98%   01/13/20 94%   01/10/20 97%      Temp Readings from Last 1 Encounters:   01/30/20 98.5  F (36.9  C) (Oral)    Ht Readings from Last 1 Encounters:   01/30/20 1.467 m (4' 9.76\")      Wt Readings from Last 1 Encounters:   01/30/20 74.8 kg (165 lb)    Estimated body mass index is 34.78 kg/m  as calculated from the following:    Height as of this encounter: 1.467 m (4' 9.76\").    Weight as of this encounter: 74.8 kg (165 lb).     LDA:  Urethral Catheter Double-lumen;Latex;Straight-tip;Silver coated 16 fr (Active)   Number of days: 443        JZG FV AN PLAN NO PONV RULE       PAC Discussion and Assessment    ASA Classification: 3  Case is suitable for:   Anesthetic techniques and relevant risks discussed:   Invasive monitoring and risk discussed:   Types:   Possibility and Risk of blood transfusion discussed:   NPO instructions given:   Additional anesthetic preparation and risks " discussed:   Needs early admission to pre-op area:   Other:     PAC Resident/NP Anesthesia Assessment:  Dimple Oviedo is a 86 year old female scheduled to undergo ARTHROPLASTY, KNEE, TOTAL, RIGHT with Edward Otero MD on 2/17/20 at [facility] for treatment of osteoarthritis of right knee.    Pt has had prior anesthetic. Type: General    History of anesthetic complications: + PONV    She has the following specific operative considerations:   # JESS 3/8 = intermediate risk  # VTE risk: 3%  # Risk of PONV score = 4.  If > 2, anti-emetic intervention recommended.  # Anesthesia considerations:  Refer to PAC assessment in anesthesia records    # Increased risk of postoperative nausea/vomiting: Recommend use of antiemetic agents in the perioperative period.      CARDIAC: METS 3, Recently traveled to [place] to visit family. Able to walk one block w/ cane. Activity limited due to knee pain      # RCRI : No serious cardiac risks.  0.4% risk of major adverse cardiac event.     #  Hx STEMI 12/2017, treated medically, EF recovered     #  Echo 9/2018:  EF 55-60%, mild-moderate aortic stenosis  & aortic insufficiency, severe LA enlargement, grade 2 diastolic dysfunction. Will obtain current echocardiogram to assess aortic valve.     #  BNP on 1/10/20 was 510. Will order postop troponins.     #  Hx A-fib w/ spontaneous conversion     #  Angiogram 2017: no CAD     #  EKG 2/2019: sinus w/ PACs     #  HTN, on irbesartan, lasix, propanolol    PULMONARY:     # Never smoked    # No asthma or inhaler use    GI:   GERD, asymptomatic on prilosec     /RENAL:     #  Hx right urothelial cancer, s/p nephroureterectomy 11/2018, s/p chemo    #  Bladder cancer, confirmed on 1/13/20 biopsy    ENDO: BMI 35    # Grave's disease    # No DM    HEME:   # On ASA 81 mg, will stop 7d prior  # Iron defic anemia, on ferrous sulfate    ORTHO: limited neck extension ROM, no TMJ    # knee  osteoarthritis    Enhanced recovery pathway initiated.    Patient was discussed with Dr Monsalve.    * Patient's oncologist, Jaden Toledo MD, was contacted (per Dr. Otero's request) regarding further evaluation prior to surgery, if indicated. Awaiting feedback.    * Awaiting results of preop labs (not drawn on day of this evaluation)    * Awaiting results of updated echocardiogram.        Reviewed and Signed by PAC Mid-Level Provider/Resident  Mid-Level Provider/Resident: Sana Ferreira PA-C  Date: 1/31/20  Time: 1842    Attending Anesthesiologist Anesthesia Assessment:  I have examined the patient and reviewed the medical record.  I have discussed the patient with the DARON and concur with her assessment  The patient is scheduled for TKA for severe osteoarthritis  The patient had nephrouretectomy 11/ 2018 without complication   The patient has history of stress induced cardiomyopathy 2017 with EF decreased to 30%.  Cardiac cath demonstrated no coronary artery disease. She has been managed subsequently with medication and has returned to normal EF of 60%.  She had atrial fibrillation which has resolved and she is no longer on anticoagulants.  Follow up Zio patch demonstrated only intermittent SVT.  She had mild - moderate Aortic stenosis with peak flow of 2.8 m/sec in 2018.  Cardiology has signed off of her care.  She denies cardiac symptoms but does have limited exercise because of knee pain.  Of note, recently found to have bladder mass and may need intervention soon given ureteral cancer history    PE:  Pleasant, overweight female in NAD.  MPC 2, limited neck extension, six cm TMD.  Lungs clear  CV RRR with 4/6 systolic murmur    Will notify orthopedic surgeon of urologic findings.  May need to post pone elective orthopedic surgery  Will repeat echo to evaluate for progression of aortic stenosis.  Final plan per attending anesthesiologist the day of surgery.      Reviewed and Signed by PAC  Anesthesiologist  Anesthesiologist: Ariel Monsalve MD  Date: 1/30/2020  Time:   Pass/Fail:   Disposition:     PAC Pharmacist Assessment:        Pharmacist:   Date:   Time:    Sana Ferreira PA-C

## 2020-01-30 NOTE — H&P
Pre-Operative H & P     CC:  Preoperative exam to assess for increased cardiopulmonary risk while undergoing surgery and anesthesia.    Date of Encounter: 1/30/2020  Primary Care Physician:  Pop Zapien  Reason for Visit: Primary localized osteoarthritis of right knee    ISAAK Oviedo is a 85 y/o female who presents for pre-operative H&P in preparation for ARTHROPLASTY, KNEE, TOTAL, RIGHT with Edward Otero MD on 2/17/20 at [facility] for treatment of osteoarthritis of right knee    Ms. Oviedo has a prolonged history of right knee pain due to advanced tricompartmental osteoarthritis right knee with valgus deformity .  She states that her right knee is gotten to the point where it bothers her enough to seek surgical intervention.  Her discomfort is not constant but it is regular enough that it impairs her activities particularly with weightbearing.  She uses a cane on a regular basis.  She has a history of several corticosteroid injections was performed which provide her with 100% pain relief.  She has never had any skin issues with her lymphedema and is able to manage it with compression stockings. She has been counseled on the above procedure.     She is followed by Dr. King for urothelial carcinoma. She is s/p multiple procedures including Davinci nephroureterectomy in 2018. She is s/p 4 cycles of adjuvant carboplatin plus gemcitabine chemotherapy per POUT trial.    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  She was initially anticoagulated but was dc'd related to hematuria. She underwent Holter monitoring in July 2018 that was within normal limits. She denies any chest pain, SOB, or palpitations. She is followed by Dr. Griffith and the [facility] last seen in 2018 and considered stable. She was released to primary care and/or prn  return. 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 She was evaluated by IR with surveillance recommended.      Patient is treated for HLD, HTN, GERD, anemia, thyroid disease, lymphedema, RLS, and depression.    History was obtained from patient & chart review. She is accompanied by her daughter-in-law.    Past Medical History  Past Medical History:   Diagnosis Date     Calculus of kidney      Chemotherapy-induced neutropenia (H) 3/6/2019     Esophageal reflux      GERD (gastroesophageal reflux disease)      Hyperlipidemia LDL goal <130 5/9/2010     Malignant melanoma of skin of trunk, except scrotum (H)      Nonspecific abnormal finding     has living will 2004 -      Nontoxic multinodular goiter     no further eval /tx rec per pt     Osteopenia      Other psoriasis      Personal history of colonic polyps      PMR (polymyalgia rheumatica) (H)      Stress-induced cardiomyopathy      Undiagnosed cardiac murmurs      Unspecified constipation      Unspecified essential hypertension      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       Past Surgical History  Past Surgical History:   Procedure Laterality Date     BIOPSY       C NONSPECIFIC PROCEDURE  2005    colonoscopy polyp repeat 2010     COLONOSCOPY  2014     COMBINED CYSTOSCOPY, INSERT STENT URETER(S) Bilateral 9/12/2018    Procedure: COMBINED CYSTOSCOPY, INSERT STENT URETER(S);  Cystoscopy Bilateral ureteral Stent Placement.;  Surgeon: Justin Henry MD;  [facility]     COMBINED CYSTOSCOPY, RETROGRADES, URETEROSCOPY, INSERT STENT Bilateral 4/3/2018    Procedure: COMBINED CYSTOSCOPY, RETROGRADES, URETEROSCOPY, INSERT STENT;;  Surgeon: Stuart King MD;  [facility]     COMBINED CYSTOSCOPY, RETROGRADES, URETEROSCOPY, INSERT STENT Bilateral 9/10/2018    Procedure: COMBINED CYSTOSCOPY, RETROGRADES, URETEROSCOPY, INSERT STENT;  Cystoscopy, Bilateral Ureteroscopy, Bladder Biopsies, Retrogram Pyelograms, Ureteral  Washings and brushings, cysview;  Surgeon: Stuart King MD;  [facility]     CYSTOSCOPY, BIOPSY BLADDER INSTILL OPTICAL AGENT N/A 4/3/2018    Procedure: CYSTOSCOPY, BIOPSY BLADDER INSTILL OPTICAL AGENT;  Cystoscopy, Blue Light Cystoscopy, Bladder Biopsies, Bilateral Selective ureteral washings for Cytology, Bilateral Retrograde Pyelograms, Bilateral Ureteroscopy;  Surgeon: Stuart King MD;  [facility]     CYSTOSCOPY, BIOPSY BLADDER, COMBINED N/A 2/19/2018    Procedure: COMBINED CYSTOSCOPY, BIOPSY BLADDER;  Cystoscopy, Bladder Biopsy;  Surgeon: Kenna La MD;  [facility]: [facility]     CYSTOSCOPY, FULGURATE BLADDER TUMOR, COMBINED N/A 1/13/2020    Procedure: CYSTOSCOPY, WITH  bladder biopsies and fulguration;  Surgeon: Stuart King MD;  [facility]     CYSTOSCOPY, REMOVE STENT(S), COMBINED  11/13/2018    Procedure: Flexible Cystoscopy with Stent Removal;  Surgeon: Stuart King MD;  [facility]     DAVINCI LYMPHADENECTOMY N/A 11/13/2018    Procedure: Davinci Lymphadenectomy ;  Surgeon: Stuart King MD;  [facility]     DAVINCI NEPHROURETERECTOMY N/A 11/13/2018    Procedure: Right DaVinci Assisted Nephroureterectomy;  Surgeon: Stuart King MD;  [facility]     ENDOSCOPIC ULTRASOUND LOWER GASTROINTESTIONAL TRACT (GI) N/A 10/30/2015    Procedure: ENDOSCOPIC ULTRASOUND LOWER GASTROINTESTIONAL TRACT (GI);  Surgeon: Daniel Jean-Baptiste MD;  [facility]     EYE SURGERY  12/4/17     INSERT PORT VASCULAR ACCESS Right 12/19/2018    Procedure: Chest Port Placement - right;  Surgeon: Stuart Chavez PA-C;  [facility]     IR CHEST PORT PLACEMENT > 5 YRS OF AGE  12/19/2018     IR PORT REMOVAL RIGHT  6/26/2019     LAPAROSCOPIC CHOLECYSTECTOMY WITH CHOLANGIOGRAMS N/A 11/1/2015    Procedure: LAPAROSCOPIC CHOLECYSTECTOMY WITH CHOLANGIOGRAMS;  Surgeon: Tonie Warren MD;  [facility]     REMOVE  "PORT VASCULAR ACCESS Right 6/26/2019    Procedure: Right Port Removal;  Surgeon: Froilan Irizarry PA-C;  [facility]     SURGICAL HISTORY OF -   1996    malignant melanoma     SURGICAL HISTORY OF -   1968    thyroid nodule     SURGICAL HISTORY OF -       D & C       Hx of Blood transfusions/reactions: no     Hx of abnormal bleeding or anti-platelet use: on ASA 81mg    Menstrual history: No LMP recorded. Patient is postmenopausal.:      Steroid use in the last year: no    Personal or FH with difficulty with Anesthesia:  +PONV    Prior to Admission Medications  Current Outpatient Medications   Medication Sig Dispense Refill     acetaminophen (TYLENOL) 650 MG CR tablet Take 1 tablet (650 mg) by mouth every 8 hours as needed for pain (Patient taking differently: Take 1,300 mg by mouth 2 times daily as needed for pain (PT last dose 1.10.2020) ) 100 tablet 0     alendronate (FOSAMAX) 70 MG tablet TAKE 1 TABLET EVERY 7 DAYS AT LEAST 60 MIN BEFORE BREAKFAST AS DIRECTED \"SEE PACKAGE FOR ADDITIONAL INSTRUCTIONS\" (Patient taking differently: Take 70 mg by mouth every 7 days TAKE 1 TABLET EVERY 7 DAYS AT LEAST 60 MIN BEFORE BREAKFAST AS DIRECTED \"SEE PACKAGE FOR ADDITIONAL INSTRUCTIONS\"  Takes on Mondays) 12 tablet 3     aspirin 81 MG tablet Take 81 mg by mouth every evening        Calcium Citrate-Vitamin D (CALCIUM + D PO) Take 1 tablet by mouth daily        cycloSPORINE (RESTASIS) 0.05 % ophthalmic emulsion Place 1 drop into both eyes 2 times daily       ferrous sulfate 325 (65 Fe) MG TBEC EC tablet Take 325 mg by mouth every morning        furosemide (LASIX) 20 MG tablet Take 1 tablet (20 mg) by mouth every morning 90 tablet 3     irbesartan (AVAPRO) 300 MG tablet TAKE 1 TABLET EVERY DAY (Patient taking differently: Take 300 mg by mouth every morning TAKE 1 TABLET EVERY DAY) 90 tablet 3     lovastatin (MEVACOR) 40 MG tablet TAKE 1 TABLET AT BEDTIME (HYPERLIPIDEMIA LDL GOAL BELOW 130) 90 tablet 3     methimazole " (TAPAZOLE) 5 MG tablet 10 mg alternating with 15 mg every other day (Patient taking differently: Take 5 mg by mouth every morning ) 225 tablet 3     nitroGLYcerin (NITROSTAT) 0.4 MG sublingual tablet For chest pain place 1 tablet under the tongue every 5 minutes for 3 doses. If symptoms persist 5 minutes after 1st dose call 911. 25 tablet 3     Omega-3 Fatty Acids (FISH OIL) 500 MG CAPS Take 1 capsule by mouth daily        omeprazole (PRILOSEC) 20 MG DR capsule TAKE 1 CAPSULE EVERY DAY (Patient taking differently: Take 20 mg by mouth every morning TAKE 1 CAPSULE EVERY DAY) 90 capsule 3     Probiotic Product (PROBIOTIC ADVANCED PO) Take 1 capsule by mouth every morning        propranolol ER (INDERAL LA) 60 MG 24 hr capsule TAKE 1 CAPSULE EVERY DAY (Patient taking differently: Take 60 mg by mouth every morning ) 90 capsule 1     rOPINIRole (REQUIP) 0.25 MG tablet 0.25 mg in the afternoon and 0.5 mg at night (Patient taking differently: Take 0.5 mg by mouth daily (with dinner) ) 270 tablet 1     sertraline (ZOLOFT) 100 MG tablet TAKE 1 TABLET (100 MG) BY MOUTH EVERY MORNING 90 tablet 2     traZODone (DESYREL) 50 MG tablet TAKE 1/2 TABLET AT BEDTIME (Patient not taking: No sig reported) 45 tablet 1       Allergies  Allergies   Allergen Reactions     Codeine        Social History  Social History     Socioeconomic History     Marital status:      Spouse name:      Number of children: 2     Years of education: Not on file     Highest education level: Not on file   Occupational History     Employer: RETIRED   Social Needs     Financial resource strain: Not on file     Food insecurity:     Worry: Not on file     Inability: Not on file     Transportation needs:     Medical: Not on file     Non-medical: Not on file   Tobacco Use     Smoking status: Never Smoker     Smokeless tobacco: Never Used   Substance and Sexual Activity     Alcohol use: No     Alcohol/week: 0.0 standard drinks     Drug use: No     Sexual  activity: Yes     Partners: Male   Lifestyle     Physical activity:     Days per week: Not on file     Minutes per session: Not on file     Stress: Not on file   Relationships     Social connections:     Talks on phone: Not on file     Gets together: Not on file     Attends Hinduism service: Not on file     Active member of club or organization: Not on file     Attends meetings of clubs or organizations: Not on file     Relationship status: Not on file     Intimate partner violence:     Fear of current or ex partner: Not on file     Emotionally abused: Not on file     Physically abused: Not on file     Forced sexual activity: Not on file   Other Topics Concern      Service No     Blood Transfusions No     Caffeine Concern Not Asked     Occupational Exposure Not Asked     Hobby Hazards Not Asked     Sleep Concern Not Asked     Stress Concern Not Asked     Weight Concern Not Asked     Special Diet Not Asked     Back Care Not Asked     Exercise Yes     Comment: curves     Bike Helmet Not Asked     Seat Belt Yes     Self-Exams Yes     Parent/sibling w/ CABG, MI or angioplasty before 65F 55M? No   Social History Narrative    December 7, 2009    Balanced Diet - Yes    Osteoporosis Prevention Measures - Dairy servings per day: 2 and Medication/Supplements (See current meds)    Regular Exercise -  Yes Describe curves, walking    Dental Exam - YES - Date: 10/2009    Eye Exam - YES - Date: 2008    Self Breast Exam - Yes    Abuse: Current or Past (Physical, Sexual or Emotional)- No    Do you feel safe in your environment - Yes    Guns stored in the home - No    Sunscreen used - Yes    Seatbelts used - Yes    Lipids -  YES - Date: 11/24/2009    Glucose -  YES - Date: 11/24/2009    Colon Cancer Screening - Colonoscopy 11/18/2005(date completed)    Hemoccults - YES - Date: 10/12/2004    Pap Test -  YES - Date: 09/22/2004    Do you have any concerns about STD's -  No    Mammography - YES - Date: 11/24/2009    DEXA - YES -  Date: 11/20/2008    Immunizations reviewed and up to date - Yes    PAULETTE Spencer CMA               Family History  Family History   Problem Relation Age of Onset     Cancer Father         dec - esophageal and laryngeal     Heart Disease Mother      Respiratory Mother         dec     Breast Cancer Daughter      Other Cancer Daughter      Thyroid Disease Daughter      Asthma Daughter      Hyperlipidemia Son      Diabetes Son          ROS/MED HX  The complete review of systems is negative other than noted in the HPI or here.  Patient denies recent illness, fever and respiratory infection during past month.  Pt denies steroid use during past year.    ENT/Pulmonary:     (+)JESS risk factors snores loudly, hypertension, , . .   (-) tobacco use and asthma   Neurologic:     (+)other neuro tremors, RLS   (-) seizures and Neuropathy   Cardiovascular: Comment:  Stress induced cardiomyopathy EF 35%. Angiogram no CAD. F/U ECHO Jan EF 65%.   AF with RVR in setting of hyperthyroidism. Spontaneous conversion. Managed with B-blocker, ACEI, statin and ASA.   Xarelto DC'd 2/2 bleeding.   Chronic bilateral lower extremity lymphedema.        (+) Dyslipidemia, hypertension----. : . CHF etiology: Stress induced cardiomyopathy 12/13/17 Last EF: 55-60% date: 9/2018 . . :. dysrhythmias a-fib, valvular problems/murmurs type: AS and AI mild to moderate on echo 2018:. Previous cardiac testing Echodate:9/2018results:Interpretation Summary  Left ventricular function, chamber size, wall motion, and wall thickness are  normal.The EF is 55-60%.  Right ventricular function, chamber size, wall motion, and thickness are  normal.  Severe left atrial enlargement is present.  Aortic valve poorly visualized but appears to have moderate calcification and  at least mild-moderate stenosis.  Mild to moderate aortic insufficiency is present.  IVC measures 2.47cm and collapses with inspiration. Estimated mean right  atrial pressure is 8 mmHg (mildly elevated).  No  pericardial effusion is present.date: results:ECG reviewed date:2/3/19 results:SR, PACs, LADCath date: 12/2017 results:         (-) taking anticoagulants/antiplatelets   METS/Exercise Tolerance: Comment: Recently traveled to [place] to visit family. Able to walk one block w/ cane. Activity limited due to knee pain. 3 - Able to walk 1-2 blocks without stopping   Hematologic: Comments: Taking ferrous sulfate.    (+) Anemia, -     (-) History of Transfusion   Musculoskeletal: Comment: Osteopenia    Osteoarthritis in knees bilaterally. Right hip pain. Lumbar stenosis, LBP.  (+) arthritis,  -       GI/Hepatic:     (+) GERD Asymptomatic on medication, cholecystitis/cholelithiasis (s/p cholecystectomy),       Renal/Genitourinary: Comment: S/P nephroureterectomy 11/2018    (+) chronic renal disease, type: CRI, Nephrolithiasis , Pt does not require dialysis, Pt has no history of transplant, Other Renal/ Genitourinary, Hematuria      Endo:     (+) thyroid problem (Graves disease)  hyperthyroidism, Obesity, .      Psychiatric:     (+) psychiatric history depression      Infectious Disease:  - neg infectious disease ROS       Malignancy:   (+) Malignancy History of Other  Skin CA status post Surgery, Other CA Urothelial cancer Active status post Surgery and Chemo         Other:    (+) C-spine cleared: N/A, no H/O Chronic Pain,no other significant disability              PHYSICAL EXAM:   Mental Status/Neuro: A/A/O; Age Appropriate   Airway: Facies: Feasible  Mallampati: III  Mouth/Opening: Full  TM distance: > 6 cm  Neck ROM: Limited   Respiratory: Auscultation: CTAB     Resp. Rate: Normal     Resp. Effort: Normal      CV: Rhythm: Regular  Rate: Age appropriate  Heart: Murmur (Systolic; soft)  Edema: RLE; LLE   Comments:      Dental: Normal Dentition                  Preop Vitals    BP Readings from Last 3 Encounters:   01/30/20 134/57   01/28/20 (!) 156/65   01/13/20 (!) 140/62    Pulse Readings from Last 3 Encounters:  "  01/30/20 52   01/28/20 61   01/13/20 50      Resp Readings from Last 3 Encounters:   01/30/20 12   01/13/20 16   01/10/20 15    SpO2 Readings from Last 3 Encounters:   01/30/20 98%   01/13/20 94%   01/10/20 97%      Temp Readings from Last 1 Encounters:   01/30/20 98.5  F (36.9  C) (Oral)    Ht Readings from Last 1 Encounters:   01/30/20 1.467 m (4' 9.76\")      Wt Readings from Last 1 Encounters:   01/30/20 74.8 kg (165 lb)    Estimated body mass index is 34.78 kg/m  as calculated from the following:    Height as of this encounter: 1.467 m (4' 9.76\").    Weight as of this encounter: 74.8 kg (165 lb).     Temp: 98.5  F (36.9  C) Temp src: Oral BP: 134/57 Pulse: 52   Resp: 12 SpO2: 98 %         165 lbs 0 oz  4' 9.756\"   Body mass index is 34.78 kg/m .    Physical Exam  Constitutional: Awake, alert, cooperative, no apparent distress, and appears stated age.  Eyes: Pupils equal, round and reactive to light, extra ocular muscles intact, sclera clear, conjunctiva normal.  HENT: Normocephalic, oral pharynx with moist mucus membranes, good dentition. No goiter appreciated. No removable dental hardware.  Respiratory: Clear to auscultation bilaterally, no crackles or wheezing. No SOB when supine.  Cardiovascular: Regular rate and rhythm, normal S1 and S2, and no murmur noted.  Carotids +2, no bruits. B/L edema, wearing compression stockings. Palpable pulses to radial & DP arteries. PT arteries not palpable.   GI: Normal bowel sounds, soft, non-distended, non-tender, no masses palpated, no hepatomegaly.    Lymph/Hematologic: No cervical lymphadenopathy and no supraclavicular lymphadenopathy.  Genitourinary:  deferred  Skin: Warm and dry.  No rashes at anticipated surgical site.   Musculoskeletal: limited extension ROM of neck. There is no redness, warmth, or swelling of the joints. Gross motor strength is normal.    Neurologic: Awake, alert, oriented to name, place and time. Cranial nerves II-XII are grossly intact. Gait is " slow but stable. Ambulates from chair to exam table, seats self, lies supine and sits back up w/o assistance.  Neuropsychiatric: Calm, cooperative. Normal affect. Pleasant. Answers questions appropriately, follows commands w/o difficulty.    PRIOR LABS/DIAGNOSTIC STUDIES:  All labs and imaging personally reviewed    EKG 2/3/19  Sinus rhythm with Premature atrial complexes  Left axis deviation  Moderate voltage criteria for LVH, may be normal variant  Abnormal ECG  Ventricular rate 77 bpm    ECHOCARDIOGRAM 9/11/18  Interpretation Summary  Left ventricular function, chamber size, wall motion, and wall thickness are  normal.The EF is 55-60%.  Right ventricular function, chamber size, wall motion, and thickness are  normal.  Severe left atrial enlargement is present.  Aortic valve poorly visualized but appears to have moderate calcification and  at least mild-moderate stenosis.  Mild to moderate aortic insufficiency is present.  IVC measures 2.47cm and collapses with inspiration. Estimated mean right  atrial pressure is 8 mmHg (mildly elevated).  No pericardial effusion is present.      Left Ventricle  Left ventricular function, chamber size, wall motion, and wall thickness are  normal.The EF is 55-60%. Left ventricular size is normal. Relative wall  thickness is increased consistent with concentric remodeling. Grade II or  moderate diastolic dysfunction. No regional wall motion abnormalities are  seen.     Right Ventricle  Right ventricular function, chamber size, wall motion, and thickness are  normal.     Atria  Severe left atrial enlargement is present. Mild right atrial enlargement is  present.      Mitral Valve  The mitral valve is normal. Trace mitral insufficiency is present.     Aortic Valve  Moderate to severe aortic valve sclerosis is present. Mild to moderate aortic  insufficiency is present. Mild to moderate aortic stenosis is present. The  peak aortic velocity is 2.8 m/sec. The V2/V1 ratio is .43.      Tricuspid Valve  The tricuspid valve is normal. Moderate tricuspid insufficiency is present.  Right ventricular systolic pressure is 43mmHg above the right atrial pressure.  Suggests mild-moderate pulmonary hypertension.     Pulmonic Valve  The pulmonic valve is normal.     Vessels  IVC measures 2.47cm and collapses with inspiration.Aortic root and proximal  ascending aorta are normal in size. Dilation of the inferior vena cava is  present with normal respiratory variation in diameter. Estimated mean right  atrial pressure is 8 mmHg (mildly elevated).     Pericardium  No pericardial effusion is present.      Compared to Previous Study  compared to the previous study done on 7/23/18 there has been no significant  change.    US Carotid 8/29/18  Impression:  No hemodynamically significant stenosis in either carotid artery.    Ziopatch 5/2018  One symptomatic episode corresponded to SR    Angiogram 2017  Normal coronary arteries    Imaging and cardiac testing reviewed by this provider    Labs today: (personally reviewed)  BMP, CBC w/ diff, INR, T&S, UA, MRSA  Echocardiogram to assess progression of aortic stenosis (to be scheduled)      ASSESSMENT and PLAN  Dimple Oviedo is a 86 year old female scheduled to undergo ARTHROPLASTY, KNEE, TOTAL, RIGHT with Edward Otero MD on 2/17/20 at [facility] for treatment of osteoarthritis of right knee.    Pt has had prior anesthetic. Type: General    History of anesthetic complications: + PONV    She has the following specific operative considerations:   # JESS 3/8 = intermediate risk  # VTE risk: 3%  # Risk of PONV score = 4.  If > 2, anti-emetic intervention recommended.  # Anesthesia considerations:  Refer to PAC assessment in anesthesia records    # Increased risk of postoperative nausea/vomiting: Recommend use of antiemetic agents in the perioperative period.      CARDIAC: METS 3, Recently traveled to [place] to visit family.  "Able to walk one block w/ cane. Activity limited due to knee pain      # RCRI : No serious cardiac risks.  0.4% risk of major adverse cardiac event.     #  Hx STEMI 12/2017, treated medically, EF recovered     #  Echo 9/2018:  EF 55-60%, mild-moderate aortic stenosis  & aortic insufficiency, severe LA enlargement, grade 2 diastolic dysfunction. Will obtain current echocardiogram to assess aortic valve.     #  BNP on 1/10/20 was 510. Will order postop troponins.     #  Hx A-fib w/ spontaneous conversion     #  Angiogram 2017: no CAD     #  EKG 2/2019: sinus w/ PACs     #  HTN, on irbesartan, lasix, propanolol    PULMONARY:     # Never smoked    # No asthma or inhaler use    GI:   GERD, asymptomatic on prilosec     /RENAL:     #  Hx right urothelial cancer, s/p nephroureterectomy 11/2018, s/p chemo    #  Bladder cancer, confirmed on 1/13/20 biopsy    ENDO: BMI 35    # Grave's disease    # No DM    HEME:   # On ASA 81 mg, will stop 7d prior  # Iron defic anemia, on ferrous sulfate    ORTHO: limited neck extension ROM, no TMJ    # knee osteoarthritis    Enhanced recovery pathway initiated.    Patient was discussed with Dr Monsalve.    * Patient's oncologist, Jaden Toledo MD, was contacted (per Dr. Otero's request) regarding further evaluation prior to surgery, if indicated. Awaiting feedback.    * Awaiting results of preop labs (not drawn on day of this evaluation)    * Awaiting results of updated echocardiogram.    Arrival time, NPO, shower and medication instructions provided by nursing staff today.      Preparing For Your Surgery handout given.        ADDENDUM 2/3/20:   Per telephone encounter dated 1/31/20:  \"Received call from patient requesting to cancel her surgery with Dr Otero scheduled for 2/17/20. Patient reports she saw Dr King in Urology yesterday and it was recommended she start BCG treatment. Patient will contact us once she has been cleared by Dr King to proceed with knee replacement, but has asked " "that we also communicate with Dr King's team so her knee surgery isn't delayed longer than necessary.\"    ADDENDUM 2/5/20:  Per staff message from Jaden Toledo MD: \"Patient is in remission as of Dec 2019. No active treatments or interventions planned from cancer standpoint. No restrictions on planned knee surrgery from my side.\"      Sana Ferreira PA-C  Preoperative Assessment Center  [facility]  Clinic and Surgery Center  Phone: [phone]  Fax: [phone]  "

## 2020-01-30 NOTE — PROGRESS NOTES
"[facility] medication history for January 30, 2020 is complete.    See Epic admission navigator for prior to admission medications.   Operating room staff will still need to confirm medications and last dose information on day of surgery.     Medication history interview sources:  patient, payor information.     Changes made to PTA medication list (reason)  Added: none  Deleted: none  Changed: trazodone, acetaminophen, fosamax, calcium/vit D, fish oil, requip,     Additional medication history information (including reliability of information, actions taken by pharmacist):  -- No recent (within 30 days) course of antibiotics  -- No recent (within 30 days) course of steroids  -- No recent (within 30 days) chronic daily medications stopped   -- Patient declines being on any other prescription or over-the-counter medications    Prior to Admission medications    Medication Sig Last Dose Taking? Auth Provider   acetaminophen (TYLENOL) 650 MG CR tablet Take 1 tablet (650 mg) by mouth every 8 hours as needed for pain  Patient taking differently: Take 1,300 mg by mouth 2 times daily as needed for pain (PT last dose 1.10.2020)  Taking Yes Geovanna Fregoso PA   alendronate (FOSAMAX) 70 MG tablet TAKE 1 TABLET EVERY 7 DAYS AT LEAST 60 MIN BEFORE BREAKFAST AS DIRECTED \"SEE PACKAGE FOR ADDITIONAL INSTRUCTIONS\"  Patient taking differently: Take 70 mg by mouth every 7 days TAKE 1 TABLET EVERY 7 DAYS AT LEAST 60 MIN BEFORE BREAKFAST AS DIRECTED \"SEE PACKAGE FOR ADDITIONAL INSTRUCTIONS\"  Takes on Mondays Taking Yes Pop Zapien MD   aspirin 81 MG tablet Take 81 mg by mouth every evening  Taking Yes Unknown, Entered By History   Calcium Citrate-Vitamin D (CALCIUM + D PO) Take 1 tablet by mouth daily  Taking Yes Reported, Patient   cycloSPORINE (RESTASIS) 0.05 % ophthalmic emulsion Place 1 drop into both eyes 2 times daily Taking Yes Unknown, Entered By History   ferrous sulfate 325 (65 Fe) MG TBEC " EC tablet Take 325 mg by mouth every morning  Taking Yes Unknown, Entered By History   furosemide (LASIX) 20 MG tablet Take 1 tablet (20 mg) by mouth every morning Taking Yes Pop Zapien MD   irbesartan (AVAPRO) 300 MG tablet TAKE 1 TABLET EVERY DAY  Patient taking differently: Take 300 mg by mouth every morning TAKE 1 TABLET EVERY DAY Taking Yes Pop Zapien MD   lovastatin (MEVACOR) 40 MG tablet TAKE 1 TABLET AT BEDTIME (HYPERLIPIDEMIA LDL GOAL BELOW 130) Taking Yes Pop Zapien MD   methimazole (TAPAZOLE) 5 MG tablet 10 mg alternating with 15 mg every other day  Patient taking differently: Take 5 mg by mouth every morning  Taking Yes Dhara Meza MD   Omega-3 Fatty Acids (FISH OIL) 500 MG CAPS Take 1 capsule by mouth daily  Taking Yes Unknown, Entered By History   omeprazole (PRILOSEC) 20 MG DR capsule TAKE 1 CAPSULE EVERY DAY  Patient taking differently: Take 20 mg by mouth every morning TAKE 1 CAPSULE EVERY DAY Taking Yes Pop Zapien MD   Probiotic Product (PROBIOTIC ADVANCED PO) Take 1 capsule by mouth every morning  Taking Yes Reported, Patient   propranolol ER (INDERAL LA) 60 MG 24 hr capsule TAKE 1 CAPSULE EVERY DAY  Patient taking differently: Take 60 mg by mouth every morning  Taking Yes Pop Zapien MD   rOPINIRole (REQUIP) 0.25 MG tablet 0.25 mg in the afternoon and 0.5 mg at night  Patient taking differently: Take 0.5 mg by mouth daily (with dinner)  Taking Yes Pop Zapien MD   sertraline (ZOLOFT) 100 MG tablet TAKE 1 TABLET (100 MG) BY MOUTH EVERY MORNING Taking Yes Pop Zapien MD   traZODone (DESYREL) 50 MG tablet TAKE 1/2 TABLET AT BEDTIME  Patient taking differently: Take 50 mg by mouth nightly as needed for sleep  Taking Yes Pop Zapien MD   nitroGLYcerin (NITROSTAT) 0.4 MG sublingual tablet For chest pain place 1 tablet under the tongue every 5 minutes for 3 doses. If symptoms  persist 5 minutes after 1st dose call 911.  Patient not taking: Reported on 1/30/2020 Not Taking  Roxi Lowe APRN CNP          Medication history completed by: Leandro Cruz RPH

## 2020-01-30 NOTE — PROGRESS NOTES
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 She completed chemotherapy (gem/carbo).  Stable to no evidence of disease in the upper tracts.    2. 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       Discussed that this type of cancer is very susceptible to drop metastasis and the bladder tumor is likely seeded from the kidney tumor. We will continue to monitor the other kidney.     We will proceed with intravesical therapy, which is typically well tolerated but can cause symptoms of a UTI with later doses. We do not want to administer BCG while she is recovering from knee surgery and would not want to do knee surgery while she is receiving BCG. If she has a complication from surgery then we may need to delay BCG. She would like to delay her knee surgery and proceed with BCG in mid-February. Discussed that response rate to BCG is about 70% after one course and 80% after two courses.     - Stop baby aspirin for a week to allow for bladder healing. If hematuria persists, we would need to delay BCG.   - BCG x 6 in mid-February   - Follow-up in 3 months with cystoscopy, cytology and FISH    Attestation:  This patient was seen and evaluated by me, with a scribe taking notes.  I have reviewed the note above and agree.  The physical exam and or any procedures were performed by me and the pertinant details are outlined below.       Stuart King MD  Department of Urology  [facility]    I spent over 25 minutes with the patient.  Over half this time was spent on  "counseling.    ______________________________________________________________________    HPI  Dimple Oviedo is a 86 year old female with a history of high grade upper tract urothelial cancer (pT4N1) here for follow up after right nephroureterectomy on 18. The patient is following yolanda Toledo of Hematology and On[facility].     Per Dr. Toledo's note, on 18 Mr. Oviedo- Started adjuvant chemotherapy (carbo+gem) per POUT trial. Cycle 2 - 19. Cycle 3 - 19. Cycle 4 - 19.    Date of last abdominal and pelvis imagin2019   Date of last chest imagin2019   Any recurrence? cystoscopy on 10/03/2019 showed small area of erythema. Bladder biopsy from 2020 showed urothelial carcinoma in-situ     Today she reports that she has intermittently been passing clots. Last clot was yesterday but urine was clear today. She uses baby aspirin.     She has right total knee arthoplasty planned for 2020. She has knee pain but is not crippled by the pain.     Review of Systems:   Pertinent items are noted in HPI or as below, remainder of complete ROS is negative.      Physical Exam:   Patient is a 86 year old  female   Vitals: Blood pressure 134/57, pulse 52, height 1.467 m (4' 9.76\"), weight 74.8 kg (165 lb), not currently breastfeeding.  General Appearance Adult: Alert, no acute distress, oriented  HENT: throat/mouth:normal, good dentition  Lungs: no respiratory distress, or pursed lip breathing  Heart: No obvious jugular venous distension present  Abdomen: Body mass index is 34.77 kg/m .  Musculoskeltal: extremities normal  Skin: no visible suspicious lesions or rashes  Neuro: Alert, oriented, speech and mentation normal  Psych: affect and mood normal  Gait: Normal  : deferred    Laboratory:   I reviewed all applicable laboratory and pathology data and went over findings with patient  Significant for     Lab Results   Component Value Date    CR 1.19 01/10/2020    CR 1.09 2019    CR 0.96 " 10/04/2019    CR 1.03 09/11/2019    CR 0.99 06/12/2019    CR 1.02 03/06/2019    CR 1.00 02/13/2019    CR 1.18 02/11/2019    CR 0.98 02/07/2019    CR 0.99 02/03/2019       Results for orders placed or performed during the hospital encounter of 12/26/18   UA with Microscopic   Result Value Ref Range    Color Urine Light Yellow     Appearance Urine Clear     Glucose Urine Negative NEG^Negative mg/dL    Bilirubin Urine Negative NEG^Negative    Ketones Urine Negative NEG^Negative mg/dL    Specific Gravity Urine 1.008 1.003 - 1.035    Blood Urine Trace (A) NEG^Negative    pH Urine 5.5 5.0 - 7.0 pH    Protein Albumin Urine Negative NEG^Negative mg/dL    Urobilinogen mg/dL Normal 0.0 - 2.0 mg/dL    Nitrite Urine Negative NEG^Negative    Leukocyte Esterase Urine Negative NEG^Negative    Source Catheterized Urine     WBC Urine 0 0 - 5 /HPF    RBC Urine 0 0 - 2 /HPF       Imaging:   I personally viewed all applicable images, interpreted them, and discussed findings with the patient.     Results for orders placed or performed in visit on 12/04/19   CT Chest Abdomen Pelvis w/o Contrast    Narrative    CT CHEST, ABDOMEN AND PELVIS WITHOUT CONTRAST 12/4/2019 1:58 PM     HISTORY: Upper tract urothelial cancer status post resection and 4  cycles of adjuvant chemo. Now on surveillance. 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     COMPARISON: CT chest, abdomen and pelvis 9/11/2019.    TECHNIQUE: Axial images from the thoracic inlet to the symphysis are  performed with additional coronal reformatted images. No contrast is  utilized. Radiation dose for this scan was reduced using automated  exposure control, adjustment of the mA and/or kV according to patient  size, or iterative reconstruction technique.    FINDINGS:     Chest: Scattered tiny indeterminate lung nodules are stable. No new or  enlarging lung lesions. Enlarged thyroid gland with cysts and/or  nodules appears stable in size. Aorta demonstrates scattered  calcified  plaque. No definite evidence of aneurysm. Hiatal hernia is present.    Abdomen: Prior cholecystectomy changes. No evidence of fatty liver  infiltration. The pancreas and adrenal glands are unremarkable. Prior  right nephrectomy. No recurrent soft tissue in the surgical bed. A  nonobstructing 0.4 cm stone is present in the left renal collecting  system which is stable. No hydronephrosis. No enlarged lymph nodes.  Probable colonic constipation without obstruction or diverticulitis.  Appendix is normal. 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 No interval change.    Pelvis: The bladder, uterus, adnexal regions and rectum are  unremarkable. No enlarged pelvic lymph nodes. No free pelvic fluid.  Degenerative spine changes are present. No destructive bone changes  are evident.      Impression    IMPRESSION: No acute change since prior exam. No evidence of recurrent  or metastatic disease. Tiny lung nodules are stable. Prior right  nephrectomy. Left renal artery aneurysm is stable.    ADELAIDE TORRES MD     *Note: Due to a large number of results and/or encounters for the requested time period, some results have not been displayed. A complete set of results can be found in Results Review.       Scribe Disclosure:  I, Nikkie Walker, am serving as a scribe to document services personally performed by Stuart King MD at this visit, based upon the provider's statements to me. All documentation has been reviewed by the aforementioned provider prior to being entered into the official medical record.

## 2020-01-30 NOTE — NURSING NOTE
"Chief Complaint   Patient presents with     Surgical Followup     bladder biopsy        Blood pressure 134/57, pulse 52, height 1.467 m (4' 9.76\"), weight 74.8 kg (165 lb), not currently breastfeeding. Body mass index is 34.77 kg/m .    Patient Active Problem List   Diagnosis     Esophageal reflux     Restless leg syndrome     heat intolerance     Goiter     Disorder of bone and cartilage     Other psoriasis     perirectal cyst     Malignant melanoma of skin of trunk, except scrotum (H)     Nontoxic multinodular goiter     Hyperlipidemia LDL goal <130     Hypertension goal BP (blood pressure) < 140/90     Urinary incontinence     Hip arthritis     Polymyalgia rheumatica (H)     High risk medication use     Shoulder pain     Impaired fasting blood sugar     Chronic bilateral low back pain without sciatica     Obesity, unspecified obesity severity, unspecified obesity type     Iron deficiency anemia, unspecified iron deficiency anemia type     NSTEMI (non-ST elevated myocardial infarction) (H)     Stress-induced cardiomyopathy     A-fib (H)     Anxiety     Chronic systolic heart failure (H)     Urothelial carcinoma (H)     Hyperthyroidism     Restless legs syndrome     CRESENCIO (acute kidney injury) (H)     Hydronephrosis     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)     Graves disease     Encounter for antineoplastic chemotherapy     Chemotherapy-induced neutropenia (H)     Primary localized osteoarthritis of right knee     Urothelial cancer (H)       Allergies   Allergen Reactions     Codeine        Current Outpatient Medications   Medication Sig Dispense Refill     acetaminophen (TYLENOL) 650 MG CR tablet Take 1 tablet (650 mg) by mouth every 8 hours as needed for pain (Patient taking differently: Take 1,300 mg by mouth 2 times daily as needed for pain (PT last dose 1.10.2020) ) 100 tablet 0     alendronate (FOSAMAX) 70 MG tablet TAKE 1 TABLET EVERY 7 DAYS AT LEAST 60 MIN BEFORE BREAKFAST AS DIRECTED \"SEE PACKAGE " "FOR ADDITIONAL INSTRUCTIONS\" (Patient taking differently: Take 70 mg by mouth every 7 days TAKE 1 TABLET EVERY 7 DAYS AT LEAST 60 MIN BEFORE BREAKFAST AS DIRECTED \"SEE PACKAGE FOR ADDITIONAL INSTRUCTIONS\"  Takes on Mondays) 12 tablet 3     aspirin 81 MG tablet Take 81 mg by mouth every evening        Calcium Citrate-Vitamin D (CALCIUM + D PO) Take 1 tablet by mouth daily        cycloSPORINE (RESTASIS) 0.05 % ophthalmic emulsion Place 1 drop into both eyes 2 times daily       ferrous sulfate 325 (65 Fe) MG TBEC EC tablet Take 325 mg by mouth every morning        furosemide (LASIX) 20 MG tablet Take 1 tablet (20 mg) by mouth every morning 90 tablet 3     irbesartan (AVAPRO) 300 MG tablet TAKE 1 TABLET EVERY DAY (Patient taking differently: Take 300 mg by mouth every morning TAKE 1 TABLET EVERY DAY) 90 tablet 3     lovastatin (MEVACOR) 40 MG tablet TAKE 1 TABLET AT BEDTIME (HYPERLIPIDEMIA LDL GOAL BELOW 130) 90 tablet 3     methimazole (TAPAZOLE) 5 MG tablet 10 mg alternating with 15 mg every other day (Patient taking differently: Take 5 mg by mouth every morning ) 225 tablet 3     nitroGLYcerin (NITROSTAT) 0.4 MG sublingual tablet For chest pain place 1 tablet under the tongue every 5 minutes for 3 doses. If symptoms persist 5 minutes after 1st dose call 911. 25 tablet 3     Omega-3 Fatty Acids (FISH OIL) 500 MG CAPS Take 1 capsule by mouth daily        omeprazole (PRILOSEC) 20 MG DR capsule TAKE 1 CAPSULE EVERY DAY (Patient taking differently: Take 20 mg by mouth every morning TAKE 1 CAPSULE EVERY DAY) 90 capsule 3     Probiotic Product (PROBIOTIC ADVANCED PO) Take 1 capsule by mouth every morning        propranolol ER (INDERAL LA) 60 MG 24 hr capsule TAKE 1 CAPSULE EVERY DAY (Patient taking differently: Take 60 mg by mouth every morning ) 90 capsule 1     rOPINIRole (REQUIP) 0.25 MG tablet 0.25 mg in the afternoon and 0.5 mg at night (Patient taking differently: Take 0.5 mg by mouth daily (with dinner) ) 270 tablet " 1     sertraline (ZOLOFT) 100 MG tablet TAKE 1 TABLET (100 MG) BY MOUTH EVERY MORNING 90 tablet 2     traZODone (DESYREL) 50 MG tablet TAKE 1/2 TABLET AT BEDTIME (Patient not taking: No sig reported) 45 tablet 1       Social History     Tobacco Use     Smoking status: Never Smoker     Smokeless tobacco: Never Used   Substance Use Topics     Alcohol use: No     Alcohol/week: 0.0 standard drinks     Drug use: No       SAIMA Reyes  1/30/2020  3:07 PM

## 2020-01-31 ENCOUNTER — TELEPHONE (OUTPATIENT)
Dept: ORTHOPEDICS | Facility: CLINIC | Age: 85
End: 2020-01-31

## 2020-01-31 NOTE — TELEPHONE ENCOUNTER
Received call from patient requesting to cancel her surgery with Dr Otero scheduled for 2/17/20. Patient reports she saw Dr King in Urology yesterday and it was recommended she start BCG treatment. Patient will contact us once she has been cleared by Dr King to proceed with knee replacement, but has asked that we also communicate with Dr King's team so her knee surgery isn't delayed longer than necessary.

## 2020-02-07 DIAGNOSIS — C68.9 UROTHELIAL CANCER (H): Primary | ICD-10-CM

## 2020-02-07 RX ORDER — LEVOFLOXACIN 500 MG/1
TABLET, FILM COATED ORAL
Qty: 12 TABLET | Refills: 0 | Status: SHIPPED | OUTPATIENT
Start: 2020-02-07 | End: 2020-05-14

## 2020-02-11 DIAGNOSIS — C68.9 UROTHELIAL CANCER (H): ICD-10-CM

## 2020-02-11 DIAGNOSIS — C66.1 UROTHELIAL CARCINOMA OF RIGHT DISTAL URETER (H): ICD-10-CM

## 2020-02-11 DIAGNOSIS — C68.9 UROTHELIAL CARCINOMA (H): ICD-10-CM

## 2020-02-11 RX ORDER — LIDOCAINE HYDROCHLORIDE 20 MG/ML
10 JELLY TOPICAL
Status: CANCELLED | OUTPATIENT
Start: 2020-02-19

## 2020-02-11 RX ORDER — LIDOCAINE HYDROCHLORIDE 20 MG/ML
10 JELLY TOPICAL
Status: CANCELLED | OUTPATIENT
Start: 2020-03-04

## 2020-02-11 RX ORDER — LIDOCAINE HYDROCHLORIDE 20 MG/ML
10 JELLY TOPICAL
Status: CANCELLED | OUTPATIENT
Start: 2020-02-12

## 2020-02-11 RX ORDER — LIDOCAINE HYDROCHLORIDE 20 MG/ML
10 JELLY TOPICAL
Status: CANCELLED | OUTPATIENT
Start: 2020-03-18

## 2020-02-11 RX ORDER — LIDOCAINE HYDROCHLORIDE 20 MG/ML
10 JELLY TOPICAL
Status: CANCELLED | OUTPATIENT
Start: 2020-03-11

## 2020-02-11 RX ORDER — LIDOCAINE HYDROCHLORIDE 20 MG/ML
10 JELLY TOPICAL
Status: CANCELLED | OUTPATIENT
Start: 2020-02-26

## 2020-02-12 ENCOUNTER — INFUSION THERAPY VISIT (OUTPATIENT)
Dept: ONCOLOGY | Facility: CLINIC | Age: 85
End: 2020-02-12
Attending: UROLOGY
Payer: MEDICARE

## 2020-02-12 VITALS
RESPIRATION RATE: 16 BRPM | HEART RATE: 57 BPM | SYSTOLIC BLOOD PRESSURE: 168 MMHG | DIASTOLIC BLOOD PRESSURE: 75 MMHG | TEMPERATURE: 99 F | OXYGEN SATURATION: 96 %

## 2020-02-12 DIAGNOSIS — C68.9 UROTHELIAL CANCER (H): Primary | ICD-10-CM

## 2020-02-12 DIAGNOSIS — C68.9 UROTHELIAL CARCINOMA (H): ICD-10-CM

## 2020-02-12 DIAGNOSIS — C66.1 UROTHELIAL CARCINOMA OF RIGHT DISTAL URETER (H): ICD-10-CM

## 2020-02-12 LAB
ALBUMIN UR-MCNC: NEGATIVE MG/DL
APPEARANCE UR: CLEAR
BILIRUB UR QL STRIP: NEGATIVE
COLOR UR AUTO: ABNORMAL
GLUCOSE UR STRIP-MCNC: NEGATIVE MG/DL
HGB UR QL STRIP: ABNORMAL
KETONES UR STRIP-MCNC: NEGATIVE MG/DL
LEUKOCYTE ESTERASE UR QL STRIP: NEGATIVE
NITRATE UR QL: NEGATIVE
PH UR STRIP: 5 PH (ref 5–7)
SOURCE: ABNORMAL
SP GR UR STRIP: 1.01 (ref 1–1.03)
UROBILINOGEN UR STRIP-MCNC: 0 MG/DL (ref 0–2)

## 2020-02-12 PROCEDURE — 25000128 H RX IP 250 OP 636: Mod: ZF | Performed by: UROLOGY

## 2020-02-12 PROCEDURE — 25000132 ZZH RX MED GY IP 250 OP 250 PS 637: Mod: GY,ZF | Performed by: UROLOGY

## 2020-02-12 PROCEDURE — 81003 URINALYSIS AUTO W/O SCOPE: CPT | Performed by: UROLOGY

## 2020-02-12 PROCEDURE — 51720 TREATMENT OF BLADDER LESION: CPT

## 2020-02-12 RX ADMIN — WHITE PETROLATUM 5 G: 1 OINTMENT TOPICAL at 14:54

## 2020-02-12 RX ADMIN — BACILLUS CALMETTE-GUERIN 50 MG: 50 POWDER, FOR SUSPENSION INTRAVESICAL at 14:28

## 2020-02-12 ASSESSMENT — PAIN SCALES - GENERAL: PAINLEVEL: NO PAIN (0)

## 2020-02-12 NOTE — PROGRESS NOTES
Infusion Nursing Note:  Dimple Oviedo presents today for BCG Induction Instillation #1 of 6.  Patient seen by provider today: No    Note: Patient presents to infusion with her daughter in law Day. States that she received BCG teaching from Kiera Figueroa [facility]. Medication and side effects reviewed with patient. States she has bleach at home for post BCG treatment and is getting her levofloxacin filled today. Verbalized understanding of how to take levofloxacin.      Patient has baseline frequency, urgency, and incontinence. She urinates approximately every two hours and has intermittent leaking. She states she sometimes saturates her pad. Due to baseline urinary issues, patient will stay to dwell. Patient denies any fevers, chills, hematuria, burning or pain with urination.     Depends and white petrolatum gel given to patient after BCG instillation due to incontinence history.     Treatment Conditions:   Patient states no new concerns or issues at this time. See note for existing conditions. No blood visualized in urine. Ok to proceed with treatment.     Labs Reviewed:  Lab Test 02/12/20  1340   COLOR Straw   APPEARANCE Clear   URINEGLC Negative   URINEBILI Negative   URINEKETONE Negative   SG 1.009   UBLD Moderate*   URINEPH 5.0   PROTEIN Negative   UROBILINOGEN  --    NITRITE Negative   LEUKEST Negative   RBCU  --    WBCU  --        Procedure:  16F Diehl catheter placed using sterile technique.  Catheter placed without trauma. Clear/yellow urine drained from bladder.    Patient turned every 15 minutes per protocol: Yes, turning completed at clinic per protocol.  Patient tolerated bladder instillation without incident.  Diehl discontinued intact.     Post instillation side effects and precautions discussed: YES    Discharge Plan:   Prescription refills given for Levofloxacin.  Discharge instructions reviewed with: Patient and Family.  Patient and family verbalized understanding of discharge instructions and all  questions answered.  Copy of AVS reviewed with patient and family. Patient will return 2/19/20 for next appointment.  Patient discharged in stable condition accompanied by: daughter-in-law.  Departure Mode: Ambulatory with walker.  Face to Face time: 0.    GABRIELA BERNARDO RN

## 2020-02-12 NOTE — PATIENT INSTRUCTIONS
Home discharge instructions:  -To make sure each treatment has the best chance of working, follow these steps:    Empty your bladder following the directions below (if you have difficulty holding your bladder it is ok to empty your bladder early)    -Wear pad if incontinence is a possibliity  -Wash hands throughly after using the bathroom  -For safety reasons remember to follow these directions for 6 hours after each treatment:  (for 6 hours after your empty the BCG from your bladder) (until 10:35pm tonight)   1. Sit on the toilet seat to urinate   2. Immediately after urinating- add 2 cups of undiluted chlorine bleach to the toilet    3. Close lid and let the bleach sit for 15 minutes each time   4. Drink plenty of water to flush any remaining medication out of your bladder    **These steps will help kill all the BCG bacteria and disinfect the toilet**      Take your antibiotic today at 8:30 pm and tomorrow morning.      Please call urology triage line at [phone] or [phone] with fevers or chills, burning with urination, or blood in your urine which lasts more than a 48-72 hours or with any question or concerns.

## 2020-02-13 ENCOUNTER — TELEPHONE (OUTPATIENT)
Dept: ORTHOPEDICS | Facility: CLINIC | Age: 85
End: 2020-02-13

## 2020-02-14 NOTE — TELEPHONE ENCOUNTER
Called to speak with pt , she was having issues hearing me and had to get going to an appointment. She asked me to call her daughter in law Day, who is her emergency contact. I called Day and left a message that we are just waiting for Dimple to be cleared by  to get her knee surgery by  and we will get her scheduled for the TKA.

## 2020-02-19 ENCOUNTER — INFUSION THERAPY VISIT (OUTPATIENT)
Dept: ONCOLOGY | Facility: CLINIC | Age: 85
End: 2020-02-19
Attending: UROLOGY
Payer: MEDICARE

## 2020-02-19 VITALS
RESPIRATION RATE: 16 BRPM | SYSTOLIC BLOOD PRESSURE: 146 MMHG | DIASTOLIC BLOOD PRESSURE: 76 MMHG | OXYGEN SATURATION: 97 % | TEMPERATURE: 98.1 F | HEART RATE: 52 BPM

## 2020-02-19 DIAGNOSIS — C68.9 UROTHELIAL CANCER (H): Primary | ICD-10-CM

## 2020-02-19 DIAGNOSIS — C66.1 UROTHELIAL CARCINOMA OF RIGHT DISTAL URETER (H): ICD-10-CM

## 2020-02-19 DIAGNOSIS — C68.9 UROTHELIAL CARCINOMA (H): ICD-10-CM

## 2020-02-19 PROCEDURE — 51720 TREATMENT OF BLADDER LESION: CPT

## 2020-02-19 PROCEDURE — 81003 URINALYSIS AUTO W/O SCOPE: CPT | Performed by: UROLOGY

## 2020-02-19 PROCEDURE — 25000128 H RX IP 250 OP 636: Mod: ZF | Performed by: UROLOGY

## 2020-02-19 RX ADMIN — BACILLUS CALMETTE-GUERIN 50 MG: 50 POWDER, FOR SUSPENSION INTRAVESICAL at 14:55

## 2020-02-19 ASSESSMENT — PAIN SCALES - GENERAL: PAINLEVEL: NO PAIN (0)

## 2020-02-19 NOTE — PATIENT INSTRUCTIONS
Home discharge instructions:  -To make sure each treatment has the best chance of working, follow these steps 2 hours after each treatment                        1. Lie on your back for 15 minues                        2. Lie on your left side for 15 mintues                        3. Lie on your right side for 15 minutes                        4. Get up but keep the medication  In your bladder for 60 more minutes- for a total of 2 hours                        5. Empty your bladder following the directions below (if you have difficulty holding your bladder it is ok to empty your bladder early)  -Wear pad if incontinence is a possibliity  -Wash hands throughly after using the bathroom  -For safety reasons remember to follow these directions for 6 hours after each treatment:                        1. Sit on the toilet seat to urinate                        2. Immediately after urinating- add 2 cups of undiluted chlorine bleach to the toilet                         3. Close lid and let the bleach sit for 15 minutes each time                        4. Drink plenty of water to flush any remaining medication out of your bladder     **These steps will help kill all the BCG bacteria and disinfect the toilet**        Take your antibiotic today at 9pm and tomorrow morning.        Please call urology triage line at [phone] or [phone] with fevers or chills, burning with urination, or blood in your urine which lasts more than a 48-72 hours or with any question or concerns.

## 2020-02-19 NOTE — PROGRESS NOTES
Infusion Nursing Note:  Dimple Oviedo presents today for BCG Induction.  Instillation number 2 of 6.   Patient seen by provider today: No   present during visit today: Not Applicable.    Note: Patient presents to infusion today stating she feels well. She reports no symptoms last week after her treatment except slight burning for one day following treatment. She denies any symptoms or concerns today. She states she took the Levaquin as prescribed and has enough doses left for remaining treatments.     Treatment Conditions:   Patient states no concerns or issues at this time.   No visible blood noted in today's urine sample.   Ok to proceed with treatment.     Pre-procedure Assessment:  Dysuria: No  Hematuria: No  Fever/chills: No  Increased urinary urgency: No  Increased urinary frequency: No    Labs Reviewed:  Urine analysis.  Results meet treatment conditions.     UA RESULTS:  Lab Test 02/19/20  1415   COLOR Straw   APPEARANCE Clear   URINEGLC Negative   URINEBILI Negative   URINEKETONE Negative   SG 1.009   UBLD Small*   URINEPH 5.0   PROTEIN Negative   UROBILINOGEN  --    NITRITE Negative   LEUKEST Negative   RBCU  --    WBCU  --        Procedure:  16F Diehl catheter placed.  Catheter placed without trauma.  Clear/yellow urine drained from bladder.    Patient turned every 15 minutes per protocol: Yes, turning completed at clinic per protocol.  Patient tolerated instillation without incident.    Patient instructed on the following and verbalized understanding:   Post instillation side effects and precautions discussed: YES    Discharge Plan:   Patient declined prescription refills.  Discharge instructions reviewed with: Patient and daughter-in-law.  Patient and/or family verbalized understanding of discharge instructions and all questions answered.  Copy of AVS reviewed with patient and/or family.  Patient will return 2/26/20 for next appointment.  Patient discharged in stable condition accompanied  by: daughter-in-law.  Departure Mode: Ambulatory.    Tatiana Lopez RN

## 2020-02-21 ENCOUNTER — TELEPHONE (OUTPATIENT)
Dept: ORTHOPEDICS | Facility: CLINIC | Age: 85
End: 2020-02-21

## 2020-02-21 DIAGNOSIS — Z53.9 ERRONEOUS ENCOUNTER--DISREGARD: Primary | ICD-10-CM

## 2020-02-21 DIAGNOSIS — M17.11 PRIMARY LOCALIZED OSTEOARTHRITIS OF RIGHT KNEE: Primary | ICD-10-CM

## 2020-02-21 NOTE — TELEPHONE ENCOUNTER
Spoke with patient and notify her we can schedule steroid injection. Patient said she will get it done at her primary clinic. Also notify patient she have to wait 3 months after injection to do surgery. Patient will like to have someone contact her to schedule surgery so she have an idea of when to get ready for surgery.

## 2020-02-21 NOTE — TELEPHONE ENCOUNTER
Received a message from clinic coordinator that the Patient said if she get steroid injection, she will do it with her primary clinic since it's closer to her. Patient will also like to have someone contact her to schedule surgery so she have an idea of when to get ready for surgery.    Placed a call to the patient. Her injection is scheduled for Monday at her PCP office. Explained to the patient that we are waiting for clearance from Dr. King to reschedule her surgery and that we will reach out to her once we receive the okay. Patient expressed understanding.

## 2020-02-24 ENCOUNTER — OFFICE VISIT (OUTPATIENT)
Dept: FAMILY MEDICINE | Facility: CLINIC | Age: 85
End: 2020-02-24
Payer: MEDICARE

## 2020-02-24 VITALS
OXYGEN SATURATION: 96 % | SYSTOLIC BLOOD PRESSURE: 132 MMHG | HEIGHT: 58 IN | DIASTOLIC BLOOD PRESSURE: 62 MMHG | RESPIRATION RATE: 16 BRPM | HEART RATE: 62 BPM | BODY MASS INDEX: 35.11 KG/M2 | TEMPERATURE: 97.9 F | WEIGHT: 167.25 LBS

## 2020-02-24 DIAGNOSIS — M17.0 ARTHRITIS OF BOTH KNEES: Primary | ICD-10-CM

## 2020-02-24 PROCEDURE — 20610 DRAIN/INJ JOINT/BURSA W/O US: CPT | Mod: LT | Performed by: FAMILY MEDICINE

## 2020-02-24 ASSESSMENT — MIFFLIN-ST. JEOR: SCORE: 1084.42

## 2020-02-24 NOTE — PROGRESS NOTES
Subjective     Dimple Oviedo is a 86 year old female who presents to clinic today for the following health issues:    HPI   Patient would like bilateral knee injections.    Patient is on chemotherapy and she had to cancel her knee surgery.  She was cleared by her oncology and orthopedic team to get a cortisone shot meanwhile.  She would like to get bilateral cortisone shot.    Social History     Occupational History     Employer: RETIRED   Tobacco Use     Smoking status: Never Smoker     Smokeless tobacco: Never Used   Substance and Sexual Activity     Alcohol use: No     Alcohol/week: 0.0 standard drinks     Drug use: No     Sexual activity: Yes     Partners: Male     Allergies   Allergen Reactions     Codeine      Patient Active Problem List   Diagnosis     Esophageal reflux     Restless leg syndrome     heat intolerance     Goiter     Disorder of bone and cartilage     Other psoriasis     perirectal cyst     Malignant melanoma of skin of trunk, except scrotum (H)     Nontoxic multinodular goiter     Hyperlipidemia LDL goal <130     Hypertension goal BP (blood pressure) < 140/90     Urinary incontinence     Hip arthritis     Polymyalgia rheumatica (H)     High risk medication use     Shoulder pain     Impaired fasting blood sugar     Chronic bilateral low back pain without sciatica     Obesity, unspecified obesity severity, unspecified obesity type     Iron deficiency anemia, unspecified iron deficiency anemia type     NSTEMI (non-ST elevated myocardial infarction) (H)     Stress-induced cardiomyopathy     A-fib (H)     Anxiety     Chronic systolic heart failure (H)     Urothelial carcinoma (H)     Hyperthyroidism     Restless legs syndrome     CRESENCIO (acute kidney injury) (H)     Hydronephrosis     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    Review of system: for general, respiratory, CVS, GI and psychiatry negative except for noted above.     EXAM:  /62 (BP Location: Right arm, Patient Position: Sitting, Cuff Size: Adult Regular)   Pulse 62   Temp 97.9  F (36.6  C) (Oral)   Resp 16   Ht 1.467 m (4' 9.75\")   Wt 75.9 kg (167 lb 4 oz)   SpO2 96%   BMI 35.26 kg/m    Constitutional: healthy, alert and no distress   Psychiatric: mentation appears normal and affect normal/bright     Discussed options for treatment of knee OA including oral medication, joint injection, synvisc and surgery. Pt is failing current oral meds and would like injection today. We discussed complications with current procedure including the risks of infection of the joint, bleeding in joint, pain flare up, and not being effective.     RT knee corticosteroid injection  After risks, benefits and complications of steroid injection were discussed with the patient he elected to proceed.  Using sterile technique, the area was first prepped with alcohol swab and chlorprep..  Topical ethyl chloride was used as local.  A 22 gauge, 1 1/2 inch needle was used to inject 40 mg kenalong(1ml) and 4cc of 1% lidocaine each into the knee joint using an anterolateral joint line approach on the RT side.  Pt.  tolerated the procedure well without complications.  Post injection instructions given.        left knee corticosteroid injection  After risks, benefits and complications of steroid injection were discussed with the patient he elected to proceed.  Using sterile technique, the area was first prepped with alcohol swab and chlorprep..  Topical ethyl chloride was used as local.  A 22 gauge, 1 1/2 inch needle was used to inject 40 mg kenalong(1ml) and 4cc of 1% lidocaine each into the knee joint using an anterolateral joint line approach on the left side.  Pt.  tolerated the procedure well without " complications.  Post injection instructions given.          ASSESSMENT / PLAN:  (M17.0) Arthritis of both knees  (primary encounter diagnosis)  Comment: both knee injection.   Plan: DRAIN/INJECT LARGE JOINT/BURSA, triamcinolone         (KENALOG-40) 40 MG/ML injection, TRIAMCINOLONE         ACET INJ NOS, DRAIN/INJECT LARGE JOINT/BURSA,         triamcinolone (KENALOG-40) 40 MG/ML injection,         TRIAMCINOLONE ACET INJ NOS           Patient Instructions   1. Take it easy today. Use Ice on your knee if it is swollen or having pain.   Ok to take tylenol if you need to.   2. You will know if the next 7 days if the injection is helpful. You can have a knee injection up to once every 4 months. If it is not helping your pain, please contact your doctor for follow up.  3. Watch for any signs of infection-- fever, chills, redness around the injection site. Please contact us or the doctor on call if any of these develop.                   The above note was dictated using voice recognition. Although reviewed after completion, some word and grammatical error may remain .

## 2020-02-24 NOTE — PATIENT INSTRUCTIONS
1. Take it easy today. Use Ice on your knee if it is swollen or having pain.   Ok to take tylenol if you need to.   2. You will know if the next 7 days if the injection is helpful. You can have a knee injection up to once every 4 months. If it is not helping your pain, please contact your doctor for follow up.  3. Watch for any signs of infection-- fever, chills, redness around the injection site. Please contact us or the doctor on call if any of these develop.

## 2020-02-25 RX ORDER — TRIAMCINOLONE ACETONIDE 40 MG/ML
40 INJECTION, SUSPENSION INTRA-ARTICULAR; INTRAMUSCULAR ONCE
Qty: 1 ML | Refills: 0 | OUTPATIENT
Start: 2020-02-25 | End: 2020-02-25

## 2020-02-26 ENCOUNTER — INFUSION THERAPY VISIT (OUTPATIENT)
Dept: ONCOLOGY | Facility: CLINIC | Age: 85
End: 2020-02-26
Attending: UROLOGY
Payer: MEDICARE

## 2020-02-26 VITALS — TEMPERATURE: 98 F

## 2020-02-26 DIAGNOSIS — C68.9 UROTHELIAL CARCINOMA (H): ICD-10-CM

## 2020-02-26 DIAGNOSIS — C68.9 UROTHELIAL CANCER (H): Primary | ICD-10-CM

## 2020-02-26 DIAGNOSIS — C66.1 UROTHELIAL CARCINOMA OF RIGHT DISTAL URETER (H): ICD-10-CM

## 2020-02-26 LAB
ALBUMIN UR-MCNC: NEGATIVE MG/DL
APPEARANCE UR: CLEAR
BILIRUB UR QL STRIP: NEGATIVE
COLOR UR AUTO: NORMAL
GLUCOSE UR STRIP-MCNC: NEGATIVE MG/DL
HGB UR QL STRIP: NEGATIVE
KETONES UR STRIP-MCNC: NEGATIVE MG/DL
LEUKOCYTE ESTERASE UR QL STRIP: NEGATIVE
NITRATE UR QL: NEGATIVE
PH UR STRIP: 6 PH (ref 5–7)
SOURCE: NORMAL
SP GR UR STRIP: 1.01 (ref 1–1.03)
UROBILINOGEN UR STRIP-MCNC: 0 MG/DL (ref 0–2)

## 2020-02-26 PROCEDURE — 51720 TREATMENT OF BLADDER LESION: CPT

## 2020-02-26 PROCEDURE — 25000128 H RX IP 250 OP 636: Mod: ZF | Performed by: UROLOGY

## 2020-02-26 PROCEDURE — 81003 URINALYSIS AUTO W/O SCOPE: CPT | Performed by: UROLOGY

## 2020-02-26 RX ORDER — PETROLATUM,WHITE
JELLY (GRAM) MISCELLANEOUS
Status: DISPENSED | OUTPATIENT
Start: 2020-02-26 | End: 2020-02-26

## 2020-02-26 RX ADMIN — BACILLUS CALMETTE-GUERIN 50 MG: 50 POWDER, FOR SUSPENSION INTRAVESICAL at 15:22

## 2020-02-26 ASSESSMENT — PAIN SCALES - GENERAL: PAINLEVEL: NO PAIN (0)

## 2020-02-26 NOTE — PATIENT INSTRUCTIONS
Home discharge instructions:  -To make sure each treatment has the best chance of working, follow these steps 2 hours after each treatment                        1. Lie on your back for 15 minues                        2. Lie on your left side for 15 mintues                        3. Lie on your right side for 15 minutes                        4. Get up but keep the medication  In your bladder for 60 more minutes- for a total of 2 hours                        5. Empty your bladder following the directions below (if you have difficulty holding your bladder it is ok to empty your bladder early)  -Wear pad if incontinence is a possibliity  -Wash hands throughly after using the bathroom  -For safety reasons remember to follow these directions for 6 hours after each treatment:                        1. Sit on the toilet seat to urinate                        2. Immediately after urinating- add 2 cups of undiluted chlorine bleach to the toilet                         3. Close lid and let the bleach sit for 15 minutes each time                        4. Drink plenty of water to flush any remaining medication out of your bladder     **These steps will help kill all the BCG bacteria and disinfect the toilet**      Take your antibiotic today at 1100 PM and tomorrow morning.        Please call urology triage line at [phone] or [phone] with fevers or chills, burning with urination, or blood in your urine which lasts more than a 48-72 hours or with any question or concerns.

## 2020-02-26 NOTE — PROGRESS NOTES
Infusion Nursing Note:  Dimple Oviedo presents today for BCG.  Instillation number 3 of 6.   Patient seen by provider today: No   present during visit today: Not Applicable.    Note: Dimple arrives to infusion today doing well. She fell at home on Saturday and hit her head, near the left eye. Bruising present on left eye. She did not lose consciousness and feels she lost her balance due to not using her walker. She denies any changes to vision, dizziness, headache, etc. Her last BCG infusion went as expected. She experienced body aches for 24 hours post infusion, but denies any difficulty or pain with urination, frequency, or fevers/chills today. Denies pain.   Patient states she took her Levaquin as ordered with last treatment and does have enough tablets for today's treatment.     Treatment Conditions:   Patient states no concerns or issues at this time.  Ok to proceed with treatment.   No blood noted in today's urine sample.     Pre-procedure Assessment:  Dysuria: No  Hematuria: No  Fever/chills: No  Increased urinary urgency: No  Increased urinary frequency: No    Labs Reviewed:    UA RESULTS:  Recent Labs   Lab Test 02/26/20  1410  01/10/20  1351 10/02/19  0956   COLOR Straw   < > Straw Yellow   APPEARANCE Clear   < > Clear Clear   URINEGLC Negative   < > Negative Negative   URINEBILI Negative   < > Negative Negative   URINEKETONE Negative   < > Negative Negative   SG 1.008   < > 1.010 1.015   UBLD Negative   < > Small* Trace*   URINEPH 6.0   < > 5.0 5.5   PROTEIN Negative   < > Negative Negative   UROBILINOGEN  --   --   --  0.2   NITRITE Negative   < > Negative Negative   LEUKEST Negative   < > Negative Negative   RBCU  --   --  2  --    WBCU  --   --  2  --     < > = values in this interval not displayed.     Results meet treatment conditions.     Procedure:  14F Diehl catheter placed.   Catheter placed without trauma.  Clear/yellow urine drained from bladder.    Patient turned every 15 minutes  per protocol: Yes, turning completed at clinic per protocol.  Patient tolerated instillation without incident.    Patient instructed on the following and verbalized understanding:   Medication to remain in the bladder for 2 hours post instillation: YES  Post instillation side effects and precautions discussed: YES    Discharge Plan:   Patient declined prescription refills.  Copy of AVS reviewed with patient and/or family.  Patient will return 03/04 for next appointment.  Patient discharged in stable condition accompanied by: friend.  Departure Mode: Ambulatory with walker.    Kayleen Ariza RN

## 2020-03-03 ENCOUNTER — TELEPHONE (OUTPATIENT)
Dept: ORTHOPEDICS | Facility: CLINIC | Age: 85
End: 2020-03-03

## 2020-03-03 NOTE — TELEPHONE ENCOUNTER
Returned call to patient regarding voicemail I received asking about when she can move forward with surgery. DIscussed with patient that the plan was when she saw Dr. King to complete the chemotherapy and then when cleared by Dr. King she can proceed with knee surgery. She is about skilled nursing through her chemotherapy course. She also had bilateral knee steroid injection on 2/24. Informed patient that she will need to wait 3 months from that date before we could proceed as well. Patient expressed understanding and we will reach out to her when we receive clearance from Dr. King in urology perspective.

## 2020-03-04 ENCOUNTER — INFUSION THERAPY VISIT (OUTPATIENT)
Dept: ONCOLOGY | Facility: CLINIC | Age: 85
End: 2020-03-04
Attending: UROLOGY
Payer: MEDICARE

## 2020-03-04 VITALS
DIASTOLIC BLOOD PRESSURE: 64 MMHG | RESPIRATION RATE: 16 BRPM | OXYGEN SATURATION: 96 % | SYSTOLIC BLOOD PRESSURE: 142 MMHG | HEART RATE: 60 BPM | TEMPERATURE: 98.2 F

## 2020-03-04 DIAGNOSIS — C68.9 UROTHELIAL CANCER (H): Primary | ICD-10-CM

## 2020-03-04 DIAGNOSIS — C66.1 UROTHELIAL CARCINOMA OF RIGHT DISTAL URETER (H): ICD-10-CM

## 2020-03-04 DIAGNOSIS — C68.9 UROTHELIAL CARCINOMA (H): ICD-10-CM

## 2020-03-04 LAB
ALBUMIN UR-MCNC: NEGATIVE MG/DL
APPEARANCE UR: CLEAR
BILIRUB UR QL STRIP: NEGATIVE
COLOR UR AUTO: ABNORMAL
GLUCOSE UR STRIP-MCNC: NEGATIVE MG/DL
HGB UR QL STRIP: ABNORMAL
KETONES UR STRIP-MCNC: NEGATIVE MG/DL
LEUKOCYTE ESTERASE UR QL STRIP: NEGATIVE
NITRATE UR QL: NEGATIVE
PH UR STRIP: 6 PH (ref 5–7)
SOURCE: ABNORMAL
SP GR UR STRIP: 1.01 (ref 1–1.03)
UROBILINOGEN UR STRIP-MCNC: 0 MG/DL (ref 0–2)

## 2020-03-04 PROCEDURE — 51720 TREATMENT OF BLADDER LESION: CPT

## 2020-03-04 PROCEDURE — 81003 URINALYSIS AUTO W/O SCOPE: CPT | Performed by: UROLOGY

## 2020-03-04 PROCEDURE — 25000128 H RX IP 250 OP 636: Mod: ZF | Performed by: UROLOGY

## 2020-03-04 RX ADMIN — BACILLUS CALMETTE-GUERIN 50 MG: 50 POWDER, FOR SUSPENSION INTRAVESICAL at 15:00

## 2020-03-04 ASSESSMENT — PAIN SCALES - GENERAL: PAINLEVEL: EXTREME PAIN (8)

## 2020-03-04 NOTE — PATIENT INSTRUCTIONS
Home discharge instructions:  -To make sure each treatment has the best chance of working, follow these steps 2 hours after each treatment                        1. Lie on your back for 15 minues                        2. Lie on your left side for 15 mintues                        3. Lie on your right side for 15 minutes                        4. Get up but keep the medication  In your bladder for 60 more minutes- for a total of 2 hours                        5. Empty your bladder following the directions below (if you have difficulty holding your bladder it is ok to empty your bladder early)  -Wear pad if incontinence is a possibliity  -Wash hands throughly after using the bathroom  -For safety reasons remember to follow these directions for 6 hours after each treatment:                        1. Sit on the toilet seat to urinate                        2. Immediately after urinating- add 2 cups of undiluted chlorine bleach to the toilet                         3. Close lid and let the bleach sit for 15 minutes each time                        4. Drink plenty of water to flush any remaining medication out of your bladder     **These steps will help kill all the BCG bacteria and disinfect the toilet**          Take your antibiotic today at 9pm and tomorrow morning.        Please call urology triage line at [phone] or [phone] with fevers or chills, burning with urination, or blood in your urine which lasts more than a 48-72 hours or with any question or concerns.

## 2020-03-04 NOTE — PROGRESS NOTES
Infusion Nursing Note:  Dimple Oviedo presents today for BCG Induction.  Instillation number 4 of 6.   Patient seen by provider today: No   present during visit today: Not Applicable.    Note: Patient presents to infusion today reporting pain in her knee. This has been an ongoing issue and she recently had it injected. However, the pain has increased since the injections, but the patient has been in contact with her primary care and orthopedic providers regarding this. While in infusion, patient placed ice on her knee for comfort. Patient reports following last weeks treatment she had no symptoms. She took the Levaquin as prescribed and confirmed she has tablets for this week's treatment. Patient denies any concerns today.    Treatment Conditions:   Patient states no concerns or issues at this time.   No visible blood noted in today's urine sample.   Ok to proceed with treatment.     Pre-procedure Assessment:  Dysuria: No  Hematuria: No  Fever/chills: No  Increased urinary urgency: No  Increased urinary frequency: No    Labs Reviewed:  Urine analysis.  Results meet treatment conditions.     UA RESULTS:  Lab Test 03/04/20  1410   COLOR Straw   APPEARANCE Clear   URINEGLC Negative   URINEBILI Negative   URINEKETONE Negative   SG 1.006   UBLD Small*   URINEPH 6.0   PROTEIN Negative   UROBILINOGEN  --    NITRITE Negative   LEUKEST Negative   RBCU  --    WBCU  --        Procedure:  16F Diehl catheter placed.  Catheter placed without trauma.  Clear/yellow urine drained from bladder.    Patient turned every 15 minutes per protocol: Yes, turning completed at clinic per protocol.  Patient tolerated instillation without incident for the full two hours.      Patient instructed on the following and verbalized understanding:   Post instillation side effects and precautions discussed: YES    Discharge Plan:   Patient declined prescription refills.  Discharge instructions reviewed with: Patient and  daughter-in-law.  Patient and/or family verbalized understanding of discharge instructions and all questions answered.  Copy of AVS reviewed with patient and/or family.  Patient will return 3/11/20 for next appointment.  Patient discharged in stable condition accompanied by: daughter-in-law.  Departure Mode: Ambulatory.    Tatiana Lopez RN

## 2020-03-11 ENCOUNTER — INFUSION THERAPY VISIT (OUTPATIENT)
Dept: ONCOLOGY | Facility: CLINIC | Age: 85
End: 2020-03-11
Attending: UROLOGY
Payer: MEDICARE

## 2020-03-11 VITALS
HEART RATE: 60 BPM | DIASTOLIC BLOOD PRESSURE: 55 MMHG | OXYGEN SATURATION: 97 % | RESPIRATION RATE: 16 BRPM | SYSTOLIC BLOOD PRESSURE: 147 MMHG | TEMPERATURE: 97.6 F

## 2020-03-11 DIAGNOSIS — C68.9 UROTHELIAL CANCER (H): Primary | ICD-10-CM

## 2020-03-11 DIAGNOSIS — C68.9 UROTHELIAL CARCINOMA (H): ICD-10-CM

## 2020-03-11 DIAGNOSIS — C66.1 UROTHELIAL CARCINOMA OF RIGHT DISTAL URETER (H): ICD-10-CM

## 2020-03-11 PROCEDURE — 51720 TREATMENT OF BLADDER LESION: CPT

## 2020-03-11 PROCEDURE — 25000128 H RX IP 250 OP 636: Mod: ZF | Performed by: UROLOGY

## 2020-03-11 PROCEDURE — 81003 URINALYSIS AUTO W/O SCOPE: CPT | Performed by: UROLOGY

## 2020-03-11 RX ADMIN — BACILLUS CALMETTE-GUERIN 50 MG: 50 POWDER, FOR SUSPENSION INTRAVESICAL at 15:20

## 2020-03-11 ASSESSMENT — PAIN SCALES - GENERAL: PAINLEVEL: MODERATE PAIN (5)

## 2020-03-11 NOTE — LETTER
March 30, 2020      Dimple Oviedo  [address]   [facility] [address]        Dear ,    We are writing to inform you of your test results.    Your test results fall within the expected range(s) or remain unchanged from previous results.  Please continue with current treatment plan.    Resulted Orders   UA without Microscopic   Result Value Ref Range    Color Urine Straw     Appearance Urine Clear     Glucose Urine Negative NEG^Negative mg/dL    Bilirubin Urine Negative NEG^Negative    Ketones Urine Negative NEG^Negative mg/dL    Specific Gravity Urine 1.009 1.003 - 1.035    Blood Urine Small (A) NEG^Negative    pH Urine 6.0 5.0 - 7.0 pH    Protein Albumin Urine Negative NEG^Negative mg/dL    Urobilinogen mg/dL 0.0 0.0 - 2.0 mg/dL    Nitrite Urine Negative NEG^Negative    Leukocyte Esterase Urine Negative NEG^Negative    Source Unspecified Urine        If you have any questions or concerns, please call the clinic at the number listed above.       Sincerely,  Thank you for choosing [facility] for your care. Please follow up as previously planned.  Please call with any questions or concerns.    Thank you,  Kiera Figueroa, RN Care Coordinator for  [facility]

## 2020-03-11 NOTE — PATIENT INSTRUCTIONS
Home discharge instructions:  -To make sure each treatment has the best chance of working, follow these steps 2 hours after each treatment                        1. Lie on your back for 15 minues                        2. Lie on your left side for 15 mintues                        3. Lie on your right side for 15 minutes                        4. Get up but keep the medication  In your bladder for 60 more minutes- for a total of 2 hours                        5. Empty your bladder following the directions below (if you have difficulty holding your bladder it is ok to empty your bladder early)  -Wear pad if incontinence is a possibliity  -Wash hands throughly after using the bathroom  -For safety reasons remember to follow these directions for 6 hours after each treatment:                        1. Sit on the toilet seat to urinate                        2. Immediately after urinating- add 2 cups of undiluted chlorine bleach to the toilet                         3. Close lid and let the bleach sit for 15 minutes each time                        4. Drink plenty of water to flush any remaining medication out of your bladder     **These steps will help kill all the BCG bacteria and disinfect the toilet**       Take your antibiotic today at 9:30pm and tomorrow morning.        Please call urology triage line at [phone] or [phone] with fevers or chills, burning with urination, or blood in your urine which lasts more than a 48-72 hours or with any question or concerns.

## 2020-03-11 NOTE — PROGRESS NOTES
Infusion Nursing Note:  Dimple Oviedo presents today for BCG Induction.  Instillation number 5 of 6.   Patient seen by provider today: No   present during visit today: Not Applicable.    Note: Patient presents to infusion today stating she feels well. She reports ongoing pain in her knee that she has been seen by her primary care and orthopedic physicians for. She denies the need for any pain intervention today. She states that after last week's treatment, she felt tired and had generalized body aches for 24 hours following, but all symptoms resolved by the second day. She denies any hematuria, dysuria, frequency, urgency or any other urinary symptoms following the treatment. She states she took the Levaquin as prescribed and has an adequate supply left for remaninig treatments.     Treatment Conditions:   Patient states no concerns or issues at this time.  No visible blood noted in today's urine sample.   Ok to proceed with treatment.     Pre-procedure Assessment:  Dysuria: No  Hematuria: No  Fever/chills: No  Increased urinary urgency: No  Increased urinary frequency: No    Labs Reviewed:  Urine analysis.  Results meet treatment conditions.     UA RESULTS:  Lab Test 03/11/20  1425   COLOR Straw   APPEARANCE Clear   URINEGLC Negative   URINEBILI Negative   URINEKETONE Negative   SG 1.009   UBLD Small*   URINEPH 6.0   PROTEIN Negative   UROBILINOGEN  --    NITRITE Negative   LEUKEST Negative   RBCU  --    WBCU  --        Procedure:  16F Diehl catheter placed.  Catheter placed without trauma.  Clear/yellow urine drained from bladder.    Patient turned every 15 minutes per protocol: Yes, turning completed at clinic per protocol.  Patient tolerated instillation without incident for the full two hours.     Patient instructed on the following and verbalized understanding:   Post instillation side effects and precautions discussed: YES    Discharge Plan:   Patient declined prescription refills.  Discharge  instructions reviewed with: Patient and Family.  Patient and/or family verbalized understanding of discharge instructions and all questions answered.  Copy of AVS reviewed with patient and/or family.  Patient will return 3/18/20 for next appointment.  Patient discharged in stable condition accompanied by: son and daughter-in-law.  Departure Mode: Wheelchair.    Tatiana Lopez RN

## 2020-03-13 NOTE — TELEPHONE ENCOUNTER
"Requested Prescriptions   Pending Prescriptions Disp Refills     alendronate (FOSAMAX) 70 MG tablet [Pharmacy Med Name: ALENDRONATE SODIUM 70 MG Tablet]  Last Written Prescription Date:  5/28/2019  Last Fill Quantity: 12 tabs,  # refills: 3   Last office visit: 2/24/2020 with prescribing provider:  Curry   Future Office Visit:   12 tablet 3     Sig: TAKE 1 TABLET EVERY 7 DAYS AT LEAST 60 MINUTES BEFORE BREAKFAST AS DIRECTED. SEE PACKAGE FOR ADDITIONAL INSTRUCTIONS.       Bisphosphonates Failed - 3/13/2020  1:20 PM        Failed - Dexa on file within past 2 years     Please review last Dexa result.           Failed - Normal serum creatinine on file within past 12 months     Recent Labs   Lab Test 01/10/20  1357   CR 1.19*       Ok to refill medication if creatinine is low          Passed - Recent (12 mo) or future (30 days) visit within the authorizing provider's specialty     Patient has had an office visit with the authorizing provider or a provider within the authorizing providers department within the previous 12 mos or has a future within next 30 days. See \"Patient Info\" tab in inbasket, or \"Choose Columns\" in Meds & Orders section of the refill encounter.              Passed - Medication is active on med list        Passed - Patient is age 18 or older            "

## 2020-03-16 DIAGNOSIS — E04.2 NONTOXIC MULTINODULAR GOITER: ICD-10-CM

## 2020-03-17 ENCOUNTER — TELEPHONE (OUTPATIENT)
Dept: ONCOLOGY | Facility: CLINIC | Age: 85
End: 2020-03-17

## 2020-03-17 RX ORDER — ALENDRONATE SODIUM 70 MG/1
TABLET ORAL
Qty: 0.1 TABLET | Refills: 0 | OUTPATIENT
Start: 2020-03-17

## 2020-03-17 NOTE — TELEPHONE ENCOUNTER
COVID-19 PRE-INFUSION APPOINTMENT PHONE SCREEN    1. In the last month, have you been in contact with someone who was confirmed or suspected to have Coronavirus/COVID-19? NO    2. Do you have any of the following symptoms:     a. Fever (or reported chills): NO    b. Cough: NO    c. Shortness of Breath: NO    d. Rash: NO    3. Have you traveled internationally or in the [place] in the last month? Internationally: [place], [place], [place], [place] and all of [place].  In the [place]: [place], [place], [place], [place], [place] and [place]. NO    a. If so, where?     Recommendation:    [facility]

## 2020-03-17 NOTE — TELEPHONE ENCOUNTER
"Requested Prescriptions   Pending Prescriptions Disp Refills     propranolol ER (INDERAL LA) 60 MG 24 hr capsule [Pharmacy Med Name: PROPRANOLOL HYDROCHLORIDEER 60 MG Capsule Extended Release 24 Hour] 90 capsule 1     Sig: TAKE 1 CAPSULE EVERY DAY  Last Written Prescription Date:  8/8/2019  Last Fill Quantity: 90 capsule,  # refills: 1   Last Office Visit: 2/24/2020   Future Office Visit:            Beta-Blockers Protocol Failed - 3/17/2020  1:08 PM        Failed - Blood pressure under 140/90 in past 12 months     BP Readings from Last 3 Encounters:   03/11/20 (!) 147/55   03/04/20 (!) 142/64   02/24/20 132/62           Passed - Patient is age 6 or older        Passed - Recent (12 mo) or future (30 days) visit within the authorizing provider's specialty     Patient has had an office visit with the authorizing provider or a provider within the authorizing providers department within the previous 12 mos or has a future within next 30 days. See \"Patient Info\" tab in inbasket, or \"Choose Columns\" in Meds & Orders section of the refill encounter.            Passed - Medication is active on med list             "

## 2020-03-18 ENCOUNTER — INFUSION THERAPY VISIT (OUTPATIENT)
Dept: ONCOLOGY | Facility: CLINIC | Age: 85
End: 2020-03-18
Attending: UROLOGY
Payer: MEDICARE

## 2020-03-18 VITALS
SYSTOLIC BLOOD PRESSURE: 159 MMHG | OXYGEN SATURATION: 98 % | HEART RATE: 62 BPM | TEMPERATURE: 97.8 F | RESPIRATION RATE: 16 BRPM | DIASTOLIC BLOOD PRESSURE: 68 MMHG

## 2020-03-18 DIAGNOSIS — C68.9 UROTHELIAL CARCINOMA (H): ICD-10-CM

## 2020-03-18 DIAGNOSIS — C66.1 UROTHELIAL CARCINOMA OF RIGHT DISTAL URETER (H): ICD-10-CM

## 2020-03-18 DIAGNOSIS — C68.9 UROTHELIAL CANCER (H): Primary | ICD-10-CM

## 2020-03-18 LAB
ALBUMIN UR-MCNC: NEGATIVE MG/DL
APPEARANCE UR: CLEAR
BILIRUB UR QL STRIP: NEGATIVE
COLOR UR AUTO: COLORLESS
GLUCOSE UR STRIP-MCNC: NEGATIVE MG/DL
HGB UR QL STRIP: NEGATIVE
KETONES UR STRIP-MCNC: NEGATIVE MG/DL
LEUKOCYTE ESTERASE UR QL STRIP: NEGATIVE
NITRATE UR QL: NEGATIVE
PH UR STRIP: 6 PH (ref 5–7)
SOURCE: NORMAL
SP GR UR STRIP: 1 (ref 1–1.03)
UROBILINOGEN UR STRIP-MCNC: 0 MG/DL (ref 0–2)

## 2020-03-18 PROCEDURE — 81003 URINALYSIS AUTO W/O SCOPE: CPT | Performed by: UROLOGY

## 2020-03-18 PROCEDURE — 25000128 H RX IP 250 OP 636: Mod: ZF | Performed by: UROLOGY

## 2020-03-18 PROCEDURE — 51720 TREATMENT OF BLADDER LESION: CPT

## 2020-03-18 PROCEDURE — 99207 ZZC NO BILLABLE SERVICE THIS VISIT: CPT | Mod: ZP

## 2020-03-18 PROCEDURE — 25000132 ZZH RX MED GY IP 250 OP 250 PS 637: Mod: GY,ZF | Performed by: UROLOGY

## 2020-03-18 PROCEDURE — 88112 CYTOPATH CELL ENHANCE TECH: CPT | Performed by: UROLOGY

## 2020-03-18 PROCEDURE — 88120 CYTP URNE 3-5 PROBES EA SPEC: CPT | Performed by: UROLOGY

## 2020-03-18 RX ORDER — PROPRANOLOL HCL 60 MG
60 CAPSULE, EXTENDED RELEASE 24HR ORAL EVERY MORNING
Qty: 90 CAPSULE | Refills: 1 | Status: SHIPPED | OUTPATIENT
Start: 2020-03-18 | End: 2020-08-21

## 2020-03-18 RX ADMIN — WHITE PETROLATUM: 1 OINTMENT TOPICAL at 16:47

## 2020-03-18 RX ADMIN — BACILLUS CALMETTE-GUERIN 50 MG: 50 POWDER, FOR SUSPENSION INTRAVESICAL at 14:47

## 2020-03-18 ASSESSMENT — PAIN SCALES - GENERAL: PAINLEVEL: EXTREME PAIN (8)

## 2020-03-18 NOTE — PATIENT INSTRUCTIONS
Home discharge instructions:  -To make sure each treatment has the best chance of working, follow these steps:  -Wear pad if incontinence is a possibliity  -Wash hands throughly after using the bathroom  -For safety reasons remember to follow these directions for 6 hours after each treatment:  (for 6 hours after your empty the BCG from your bladder)   1. Sit on the toilet seat to urinate   2. Immediately after urinating- add 2 cups of undiluted chlorine bleach to the toilet    3. Close lid and let the bleach sit for 15 minutes each time   4. Drink plenty of water to flush any remaining medication out of your bladder    **These steps will help kill all the BCG bacteria and disinfect the toilet**      Take your antibiotic today at 8:50 pm and tomorrow morning.      Please call urology triage line at [phone] or [phone] with fevers or chills, burning with urination, or blood in your urine which lasts more than a 48-72 hours or with any question or concerns.

## 2020-03-18 NOTE — TELEPHONE ENCOUNTER
Refused Prescriptions:                       Disp   Refills    alendronate (FOSAMAX) 70 MG tablet [Pharma*0.1 ta*0        Sig: TAKE 1 TABLET EVERY 7 DAYS AT LEAST 60 MINUTES BEFORE           BREAKFAST AS DIRECTED. SEE PACKAGE FOR ADDITIONAL           INSTRUCTIONS.  Refused By: RADHA BUSBY  Reason for Refusal: Duplicate

## 2020-03-18 NOTE — PROGRESS NOTES
Infusion Nursing Note:  Dimple Oviedo presents today for BCG Induction Instillation # 6 of 6.   Patient seen by provider today: No    Note: Patient was able to dwell in clinic for two hours last week and she took her levofloxacin as prescribed. She has enough levofloxacin for this treatment. States that she felt fatigued and had a headache last week for about 24 hours after treatment. Denies any fevers, chills, hematuria. States that her frequency and urgency remains at baseline due to lasix use. Ice pack applied to right knee for pain management. Patient states she takes tylenol at home for pain management. Declined any further pain management in infusion.     Depends and white petrolatum gel given to patient due to baseline frequency and urgency.    Treatment Conditions:   Patient states no concerns or issues at this time. No blood visualized in urine. Ok to proceed with treatment.     Labs Reviewed:  Lab Test 03/18/20  1358   COLOR Colorless   APPEARANCE Clear   URINEGLC Negative   URINEBILI Negative   URINEKETONE Negative   SG 1.005   UBLD Negative   URINEPH 6.0   PROTEIN Negative   UROBILINOGEN  --    NITRITE Negative   LEUKEST Negative   RBCU  --    WBCU  --        Procedure:  16F Diehl catheter placed.  Catheter placed without trauma. Clear/yellow urine drained from bladder.    Patient turned every 15 minutes per protocol: Yes, turning completed at clinic per protocol.  Patient tolerated bladder instillation without incident.  Patient dwelled in clinic for two hours.  Diehl discontinued intact.     Post instillation side effects and precautions discussed: YES    Discharge Plan:   Patient declined prescription refills.  Discharge instructions reviewed with: Patient.  Patient verbalized understanding of discharge instructions and all questions answered.  Copy of AVS reviewed with patient. Patient will return 4/30/20 for cystoscopy appointment.  Patient discharged in stable condition accompanied by:  self.  Departure Mode: Wheelchair.  Face to Face time: 0.    GABRIELA BERNARDO RN

## 2020-03-20 DIAGNOSIS — F41.1 GAD (GENERALIZED ANXIETY DISORDER): ICD-10-CM

## 2020-03-23 RX ORDER — SERTRALINE HYDROCHLORIDE 100 MG/1
100 TABLET, FILM COATED ORAL EVERY MORNING
Qty: 90 TABLET | Refills: 2 | Status: SHIPPED | OUTPATIENT
Start: 2020-03-23 | End: 2021-01-21

## 2020-03-23 NOTE — TELEPHONE ENCOUNTER
"Requested Prescriptions   Pending Prescriptions Disp Refills     sertraline (ZOLOFT) 100 MG tablet [Pharmacy Med Name: SERTRALINE HYDROCHLORIDE 100 MG Tablet] 90 tablet 2     Sig: TAKE 1 TABLET (100 MG) BY MOUTH EVERY MORNING  Last Written Prescription Date:  8/20/2019  Last Fill Quantity: 90 tablet,  # refills: 2   Last Office Visit: 2/24/2020   Future Office Visit:            SSRIs Protocol Passed - 3/20/2020  5:50 PM        Passed - Recent (12 mo) or future (30 days) visit within the authorizing provider's specialty     Patient has had an office visit with the authorizing provider or a provider within the authorizing providers department within the previous 12 mos or has a future within next 30 days. See \"Patient Info\" tab in inbasket, or \"Choose Columns\" in Meds & Orders section of the refill encounter.            Passed - Medication is active on med list        Passed - Patient is age 18 or older        Passed - No active pregnancy on record        Passed - No positive pregnancy test in last 12 months             "

## 2020-03-23 NOTE — TELEPHONE ENCOUNTER
Prescription approved per [facility] Refill Protocol.  Sana Pro RN

## 2020-03-26 ENCOUNTER — TELEPHONE (OUTPATIENT)
Dept: FAMILY MEDICINE | Facility: CLINIC | Age: 85
End: 2020-03-26

## 2020-03-26 NOTE — TELEPHONE ENCOUNTER
Reason for Call:  Other call back    Detailed comments: Pt states she received a letter from pharmacy and was told that her medication was denied but was given no explanation.     Phone Number Patient can be reached at: Home number on file [phone] (home)    Best Time: ASAP    Can we leave a detailed message on this number? YES    Call taken on 3/26/2020 at 8:58 AM by Carlene Gillette

## 2020-03-26 NOTE — TELEPHONE ENCOUNTER
Writer requested that patient call pharmacy to determine why alendronate (FOSAMAX) 70 MG tablet is not being covered and any alternatives that may be covered or if PA needed.   Patient will call back with information.    Thanks! Mandi Dial RN

## 2020-03-26 NOTE — TELEPHONE ENCOUNTER
Refill given in the interim due to COVID-19.     Patient informed will need a creatinine and Dexa scan.    Last Cr 1/10/20:  1.19 abnormal    Last DEXA on 11/15/2016    Please see pended lab and imaging    Please sign if agree.     Thanks! Mandi Dial RN

## 2020-04-01 LAB — COPATH REPORT: NORMAL

## 2020-04-06 DIAGNOSIS — G47.00 INSOMNIA, UNSPECIFIED TYPE: ICD-10-CM

## 2020-04-06 NOTE — TELEPHONE ENCOUNTER
Called Manav 3/27/20 - 12 tablets mailed to patient may take up to 10 days to arrive - this may still be in the mail    Called patient and updated - she currently has 2-3 weeks available to her at this time

## 2020-04-06 NOTE — TELEPHONE ENCOUNTER
Patient called in and is wondering why her med got denied refill ? This writer did see duplicate scrip but she sayd pharmacy does not have scrip . Please Advise  Beatriz Kendall   Patient Rep

## 2020-04-06 NOTE — TELEPHONE ENCOUNTER
Reason for Call:  Medication or medication refill:    Do you use a [facility]?  Name of the pharmacy and phone number for the current request:  [facility] - [place], [place] - [address]         Name of the medication requested: traZODone (DESYREL) 50 MG tablet      Other request: Patient would like a order to be placed for her lab for her med refill creatintine.    Can we leave a detailed message on this number? YES    Phone number patient can be reached at: Home number on file [phone] (home)    Best Time: ASAP    Call taken on 4/6/2020 at 10:27 AM by Beatriz Good CNA

## 2020-04-07 NOTE — PROGRESS NOTES
"MEDICAL ONCOLOGY VIRTUAL VISIT NOTE    Dimple Oviedo is a 86 year old female who is being evaluated via a billable telephone visit.      The patient has been notified of following:   \"This telephone visit will be conducted via a call between you and your physician/provider. We have found that certain health care needs can be provided without the need for a physical exam.  This service lets us provide the care you need with a short phone conversation.  If a prescription is necessary we can send it directly to your pharmacy.  If lab work is needed we can place an order for that and you can then stop by our lab to have the test done at a later time.    Telephone visits are billed at different rates depending on your insurance coverage. During this emergency period, for some insurers they may be billed the same as an in-person visit.  Please reach out to your insurance provider with any questions.    If during the course of the call the physician/provider feels a telephone visit is not appropriate, you will not be charged for this service.\"     Physician has received verbal consent for a Telephone Visit from the patient? Yes    Dimple Oviedo complains of    Chief Complaint   Patient presents with     Telephone     Urothelial Ca     I have reviewed and updated the patient's Allergy List and Medication List.    ALLERGIES  Codeine     No new concerns. No refills needed.    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    HISTORY OF PRESENT ILLNESS:  Ms. 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 Her oncologic history is as under.    Ms. Oviedo is doing well overall. States she gained a few pounds. Unable to " "play cards due to COVID-19, but trying to stay active. The only issue is L knee pain due to OA - due for a steroid injection by PCP. She denies fever, chills, shortness of breath or chest pain. There is no clinical evidence of disease progression.    She follows with Dr. King in urology and last cystoscopy on 10/03/2019 showed small area of erythema. 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 She proceeded with intravesical BCG therapy x6 (2/12-3/18). No clinical evidence of recurrence.     ONCOLOGIC HISTORY:  1. 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   - Jan 2018 to September 2018- Persistent gross hematuria and underwent multiple procedures.    - 2/19/18 and 4/3/18- cystoscopies with bladder biopsies  which were negative for bladder tumor  - 1/17/18, 3/8/18, 7/26/18, 9/10/18- Multiple urine cytologies have come back positive by FISH for high-grade transitional cell carcinoma  - 9/10/18- Underwent a bilateral cystoureteroscopy with biopsy and brushing that showed  right upper tract cytology suspicious for high-grade urothelial ca. Right ureter brushing negative from that day. Left upper tract negative.  - 9/10/18- CT A/P without contrast showed new small bilateral pleural effusions and interstitial pulmonary edema but no evidence of locoregional or metastatic disease.  - 9/12/18- Presented to ED with oliguria, CRESENCIO, and mild hydronephrosis bilaterally on CT scan and underwent bilateral ureteral stent placment  - 11/13/2018- Right robotic nephroureterectomy, excision of ana-caval mass and LN excision by Stuart King. Pathology showed \"Right nephroureterectomy: Invasive high grade urothelial carcinoma measuring 3.5 cm, located in renal pelvis and invading through renal parenchyma into perinephric fat. Urothelial carcinoma in-situ present. Margins free of tumor. Ana-caval mass: " "Metastatic urothelial carcinoma involving one lymph node with at least 1 cm tumor deposit. Pericaval Extranodal Extension present. Five additional benign pericaval lymph nodes. Pathologic stage: pT4N1 (1 of 6 lymph nodes).\"  - 12/11/18- PET/CT whole body demonstrated significant residual FDG avid disease. For example, \"there is an FDG avid ill-defined pericaval mass immediately medial to the surgical clips measuring approximately 2.0 x 1.4 cm, with max SUV measuring up to12.2, see series 4 image 21. Additional FDG avid retrocaval and interaortocaval lymphadenopathy are noted.There is a 1.2 cm nodule in the right hemipelvis posterior lateral tothe bladder with max SUV measuring 8.3, see series 4 image 77. The bladder is incompletely distended.\" No suspicious thoracic or bone uptake.  - 12/12/18- Started adjuvant chemotherapy (carbo+gem) per POUT trial. Cycle 2 - 1/2/19. Cycle 3 - 1/23/19. Cycle 4 - 02/13/19.  - 1/22/19 - CT C/A/P with contrast compared with prior PET/CT: \"1. New well-circumscribed soft tissue pulmonary nodules of the right lower lobe and left upper lobe. Findings could be infectious, inflammatory, or represent metastatic disease. Continued attention on follow-up recommended. Postoperative changes of right nephrectomy. No evidence for local recurrence in the present study.\"  - 3/1/2019- CT C/A/P with contrast - \"1. Bilateral pulmonary nodules measuring up to 4 mm in the right lower lobe, previously measuring 8 mm. No new or enlarging pulmonary nodules. 2. No convincing evidence for metastatic disease in the abdomen, pelvis and bones.\"  - 6/3/2019- CT C/A/P with contrast - \"1. Surgical changes of right nephrectomy without evidence of residual or recurrent disease on this noncontrast exam.  Consider contrast enhanced examination on follow-up. 2. No evidence of metastatic disease in the abdomen, or pelvis on noncontrast CT.  Scattered tiny pulmonary nodules in the chest are not significantly changed.  " "Previously described prominent right lower lobe pulmonary nodule (previously measuring up to 8 mm) is no longer visualized. 3. Stable bilateral pulmonary nodules measuring maximally 4 mm.\"  - 09/11/19: CT C/A/P without contrast - \"1. Stable exam without evidence convincing for new disease. 2. Stable pulmonary nodules. 3. 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 4. Stable heterogeneous enlargement of the thyroid with underlying nodules suggested.\"  - 10/3/19: Cystoscopy showed a small area of erythema on retroflexion, possibly pre-existing or due to retroflexion of the scope. Urine cytology from that time showed cells suspicious for malignancy.   - 12/4/19: CT C/A/P without contrast - \"No acute change since prior exam. No evidence of recurrent or metastatic disease. Tiny lung nodules are stable. Prior right nephrectomy. Left renal artery aneurysm is stable.\"  - 1/13/20: Bladder biopsy showed high grade urothelial carcinoma in-situ  - 2/12/20-3/18/20: Intravesical BCG therapy  - 04/08/20: CT C/A/P with contrast - GABRIELLE.    REVIEW OF SYSTEMS: 14 point ROS negative other than the symptoms noted above in the HPI.    PAST MEDICAL AND SURGICAL HISTORY:   Past Medical History:   Diagnosis Date     Calculus of kidney      Chemotherapy-induced neutropenia (H) 3/6/2019     Esophageal reflux      GERD (gastroesophageal reflux disease)      Hyperlipidemia LDL goal <130 5/9/2010     Malignant melanoma of skin of trunk, except scrotum (H)      Nonspecific abnormal finding     has living will 2004 -      Nontoxic multinodular goiter     no further eval /tx rec per pt     Osteopenia      Other psoriasis      Personal history of colonic polyps      PMR (polymyalgia rheumatica) (H)      Stress-induced cardiomyopathy      Undiagnosed cardiac murmurs      Unspecified constipation      Unspecified essential hypertension      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     Past Surgical History:   Procedure Laterality Date     " BIOPSY       C NONSPECIFIC PROCEDURE  2005    colonoscopy polyp repeat 2010     COLONOSCOPY  2014     COMBINED CYSTOSCOPY, INSERT STENT URETER(S) Bilateral 9/12/2018    Procedure: COMBINED CYSTOSCOPY, INSERT STENT URETER(S);  Cystoscopy Bilateral ureteral Stent Placement.;  Surgeon: Justin Henry MD;  [facility]     COMBINED CYSTOSCOPY, RETROGRADES, URETEROSCOPY, INSERT STENT Bilateral 4/3/2018    Procedure: COMBINED CYSTOSCOPY, RETROGRADES, URETEROSCOPY, INSERT STENT;;  Surgeon: Stuart King MD;  [facility]     COMBINED CYSTOSCOPY, RETROGRADES, URETEROSCOPY, INSERT STENT Bilateral 9/10/2018    Procedure: COMBINED CYSTOSCOPY, RETROGRADES, URETEROSCOPY, INSERT STENT;  Cystoscopy, Bilateral Ureteroscopy, Bladder Biopsies, Retrogram Pyelograms, Ureteral Washings and brushings, cysview;  Surgeon: Stuart King MD;  [facility]     CYSTOSCOPY, BIOPSY BLADDER INSTILL OPTICAL AGENT N/A 4/3/2018    Procedure: CYSTOSCOPY, BIOPSY BLADDER INSTILL OPTICAL AGENT;  Cystoscopy, Blue Light Cystoscopy, Bladder Biopsies, Bilateral Selective ureteral washings for Cytology, Bilateral Retrograde Pyelograms, Bilateral Ureteroscopy;  Surgeon: Stuart King MD;  [facility]     CYSTOSCOPY, BIOPSY BLADDER, COMBINED N/A 2/19/2018    Procedure: COMBINED CYSTOSCOPY, BIOPSY BLADDER;  Cystoscopy, Bladder Biopsy;  Surgeon: Kenna La MD;  Location: [facility]     CYSTOSCOPY, FULGURATE BLADDER TUMOR, COMBINED N/A 1/13/2020    Procedure: CYSTOSCOPY, WITH  bladder biopsies and fulguration;  Surgeon: Stuart King MD;  [facility]     CYSTOSCOPY, REMOVE STENT(S), COMBINED  11/13/2018    Procedure: Flexible Cystoscopy with Stent Removal;  Surgeon: Stuart King MD;  [facility]     DAVINCI LYMPHADENECTOMY N/A 11/13/2018    Procedure: Davinci Lymphadenectomy ;  Surgeon: Stuart King MD;  [facility]     DAVINCI NEPHROURETERECTOMY N/A  11/13/2018    Procedure: Right DaVinci Assisted Nephroureterectomy;  Surgeon: Stuart King MD;  [facility]     ENDOSCOPIC ULTRASOUND LOWER GASTROINTESTIONAL TRACT (GI) N/A 10/30/2015    Procedure: ENDOSCOPIC ULTRASOUND LOWER GASTROINTESTIONAL TRACT (GI);  Surgeon: Daniel Jean-Baptiste MD;  Location: [facility]     EYE SURGERY  12/4/17     INSERT PORT VASCULAR ACCESS Right 12/19/2018    Procedure: Chest Port Placement - right;  Surgeon: Stuart Chavez PA-C;  [facility]     IR CHEST PORT PLACEMENT > 5 YRS OF AGE  12/19/2018     IR PORT REMOVAL RIGHT  6/26/2019     LAPAROSCOPIC CHOLECYSTECTOMY WITH CHOLANGIOGRAMS N/A 11/1/2015    Procedure: LAPAROSCOPIC CHOLECYSTECTOMY WITH CHOLANGIOGRAMS;  Surgeon: Tonie Warren MD;  [facility]     REMOVE PORT VASCULAR ACCESS Right 6/26/2019    Procedure: Right Port Removal;  Surgeon: Froilan Irizarry PA-C;  [facility]     SURGICAL HISTORY OF -   1996    malignant melanoma     SURGICAL HISTORY OF -   1968    thyroid nodule     SURGICAL HISTORY OF -       D & C     SOCIAL HISTORY:   Social History     Tobacco Use     Smoking status: Never Smoker     Smokeless tobacco: Never Used   Substance Use Topics     Alcohol use: No     Alcohol/week: 0.0 standard drinks     Drug use: No     FAMILY HISTORY:   Family History   Problem Relation Age of Onset     Cancer Father         dec - esophageal and laryngeal     Heart Disease Mother      Respiratory Mother         dec     Breast Cancer Daughter      Other Cancer Daughter      Thyroid Disease Daughter      Asthma Daughter      Hyperlipidemia Son      Diabetes Son      ALLERGIES:   Allergies   Allergen Reactions     Codeine      CURRENT MEDICATIONS:   Current Outpatient Medications:      acetaminophen (TYLENOL) 650 MG CR tablet, Take 1 tablet (650 mg) by mouth every 8 hours as needed for pain (Patient taking differently: Take 1,300 mg by mouth 2 times daily as needed for pain (PT last dose  "1.10.2020) ), Disp: 100 tablet, Rfl: 0     alendronate (FOSAMAX) 70 MG tablet, TAKE 1 TABLET EVERY 7 DAYS AT LEAST 60 MIN BEFORE BREAKFAST AS DIRECTED \"SEE PACKAGE FOR ADDITIONAL INSTRUCTIONS\" (Patient taking differently: Take 70 mg by mouth every 7 days TAKE 1 TABLET EVERY 7 DAYS AT LEAST 60 MIN BEFORE BREAKFAST AS DIRECTED \"SEE PACKAGE FOR ADDITIONAL INSTRUCTIONS\" Takes on Mondays), Disp: 12 tablet, Rfl: 3     aspirin 81 MG tablet, Take 81 mg by mouth every evening , Disp: , Rfl:      Calcium Citrate-Vitamin D (CALCIUM + D PO), Take 1 tablet by mouth daily , Disp: , Rfl:      cycloSPORINE (RESTASIS) 0.05 % ophthalmic emulsion, Place 1 drop into both eyes 2 times daily, Disp: , Rfl:      ferrous sulfate 325 (65 Fe) MG TBEC EC tablet, Take 325 mg by mouth every morning , Disp: , Rfl:      furosemide (LASIX) 20 MG tablet, Take 1 tablet (20 mg) by mouth every morning, Disp: 90 tablet, Rfl: 3     irbesartan (AVAPRO) 300 MG tablet, TAKE 1 TABLET EVERY DAY (Patient taking differently: Take 300 mg by mouth every morning TAKE 1 TABLET EVERY DAY), Disp: 90 tablet, Rfl: 3     levofloxacin (LEVAQUIN) 500 MG tablet, Take 1 tablet 6 hours post treatment and 1 tablet each AM following each treatment., Disp: 12 tablet, Rfl: 0     lovastatin (MEVACOR) 40 MG tablet, TAKE 1 TABLET AT BEDTIME (HYPERLIPIDEMIA LDL GOAL BELOW 130), Disp: 90 tablet, Rfl: 3     methimazole (TAPAZOLE) 5 MG tablet, 10 mg alternating with 15 mg every other day (Patient taking differently: Take 5 mg by mouth every morning ), Disp: 225 tablet, Rfl: 3     nitroGLYcerin (NITROSTAT) 0.4 MG sublingual tablet, For chest pain place 1 tablet under the tongue every 5 minutes for 3 doses. If symptoms persist 5 minutes after 1st dose call 911., Disp: 25 tablet, Rfl: 3     Omega-3 Fatty Acids (FISH OIL) 500 MG CAPS, Take 1 capsule by mouth daily , Disp: , Rfl:      omeprazole (PRILOSEC) 20 MG DR capsule, TAKE 1 CAPSULE EVERY DAY (Patient taking differently: Take 20 mg by " mouth every morning TAKE 1 CAPSULE EVERY DAY), Disp: 90 capsule, Rfl: 3     Probiotic Product (PROBIOTIC ADVANCED PO), Take 1 capsule by mouth every morning , Disp: , Rfl:      propranolol ER (INDERAL LA) 60 MG 24 hr capsule, Take 1 capsule (60 mg) by mouth every morning, Disp: 90 capsule, Rfl: 1     rOPINIRole (REQUIP) 0.25 MG tablet, 0.25 mg in the afternoon and 0.5 mg at night (Patient taking differently: Take 0.5 mg by mouth daily (with dinner) ), Disp: 270 tablet, Rfl: 1     sertraline (ZOLOFT) 100 MG tablet, TAKE 1 TABLET (100 MG) BY MOUTH EVERY MORNING, Disp: 90 tablet, Rfl: 2     traZODone (DESYREL) 50 MG tablet, TAKE 1/2 TABLET AT BEDTIME, Disp: 45 tablet, Rfl: 1    PHYSICAL EXAMINATION:  Virtual visit.    LABORATORY DATA:   Lab Test 04/08/20  1027 01/10/20  1357 12/04/19  1419 10/04/19  1115 09/11/19  1242   WBC 6.3 7.7 6.8 5.5 7.5   RBC 4.09 4.13 3.95 3.75* 3.68*   HGB 12.6 13.2 12.6 12.8 12.3   HCT 39.9 41.0 38.8 38.0 37.4   MCV 98 99 98 101* 102*   MCH 30.8 32.0 31.9 34.1* 33.4*   MCHC 31.6 32.2 32.5 33.7 32.9   RDW 12.8 12.6 12.4 12.3 12.4    263 210 200 229   NEUTROPHIL 69.2  --  61.2  --  62.0   NA  --  135 135 138 133   POTASSIUM  --  4.2 4.7 4.2 4.4   CHLORIDE  --  101 103 102 100   CO2  --  31 29 29 24   ANIONGAP  --  3 3 7 9   GLC  --  83 110* 84 90   BUN  --  34* 28 19 24   CR  --  1.19* 1.09* 0.96 1.03   SHREYA  --  9.3 9.1 9.1 8.9   PROTTOTAL  --  7.8 7.3 7.3 7.4   ALBUMIN  --  3.6 3.5 3.8 3.6   BILITOTAL  --  0.4 0.4 0.4 0.3   ALKPHOS  --  112 104 86 98   AST  --  15 14 18 14   ALT  --  26 21 25 17     IMAGING STUDIES:  As above.     ASSESSMENT AND PLAN: Ms. 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    1. Upper tract urothelial cancer:   - She completed 4 cycles of adjuvant carboplatin plus  gemcitabine chemotherapy per POUT trial. No residual side effects or evidence of recurrence.  - She did have a high-grade CIS in Dec 2019 cysto which was definitively treated by Dr. King. Concern that this is a drop met, but no evidence of upper tract issues on today's CT.  - Reviewed restaging CT scan from 20 that showed no clear evidence of disease recurrence.   - Continue active surveillance. She's aware that this involves close monitoring for new or worsening signs or symptoms such as shortness of breath, chest pain, abdominal pain, unexplained weight loss, hematuria etc.  - Next restaging CT C/A/P in 4 months.     2. High grade urothelial carcinoma in situ, bladder  - Bx proven carcinoma in situ in 2020, completed the first round of intravesical BCG treatment 2020-3/2020.  - Due for cysto with Dr. King at the end of 2020 - encouraged to keep this appt.     3. Renal artery saccular aneursym:  - Management per IR team.   4. Chemo induced anemia and thrombocytopenia:  - Resolved.   5. History of afib and palpitations:  - Pt encouraged to keep cardiology follow-ups.   Return to clinic in 4 months with restaging CT C/A/P.  All of the above was explained to the patient in lay language and several questions were answered. They are in agreement with the plan.    BILLIN   PHONE VISIT TIME: 25 mins     Jaden Toledo M.D.  . Professor of Medicine  Genitourinary Oncology  Division of Hematology, Oncology & Transplantation  [facility]

## 2020-04-08 ENCOUNTER — ANCILLARY PROCEDURE (OUTPATIENT)
Dept: CT IMAGING | Facility: CLINIC | Age: 85
End: 2020-04-08
Attending: INTERNAL MEDICINE
Payer: MEDICARE

## 2020-04-08 ENCOUNTER — VIRTUAL VISIT (OUTPATIENT)
Dept: ONCOLOGY | Facility: CLINIC | Age: 85
End: 2020-04-08
Attending: INTERNAL MEDICINE
Payer: MEDICARE

## 2020-04-08 DIAGNOSIS — C66.1 UROTHELIAL CARCINOMA OF RIGHT DISTAL URETER (H): ICD-10-CM

## 2020-04-08 DIAGNOSIS — C66.1 UROTHELIAL CARCINOMA OF RIGHT DISTAL URETER (H): Primary | ICD-10-CM

## 2020-04-08 LAB
BASOPHILS # BLD AUTO: 0.1 10E9/L (ref 0–0.2)
BASOPHILS NFR BLD AUTO: 1 %
CREAT BLD-MCNC: 1.3 MG/DL (ref 0.52–1.04)
DIFFERENTIAL METHOD BLD: NORMAL
EOSINOPHIL # BLD AUTO: 0 10E9/L (ref 0–0.7)
EOSINOPHIL NFR BLD AUTO: 0.6 %
ERYTHROCYTE [DISTWIDTH] IN BLOOD BY AUTOMATED COUNT: 12.8 % (ref 10–15)
GFR SERPL CREATININE-BSD FRML MDRD: 39 ML/MIN/{1.73_M2}
HCT VFR BLD AUTO: 39.9 % (ref 35–47)
HGB BLD-MCNC: 12.6 G/DL (ref 11.7–15.7)
IMM GRANULOCYTES # BLD: 0 10E9/L (ref 0–0.4)
IMM GRANULOCYTES NFR BLD: 0.6 %
LYMPHOCYTES # BLD AUTO: 1.1 10E9/L (ref 0.8–5.3)
LYMPHOCYTES NFR BLD AUTO: 17.3 %
MCH RBC QN AUTO: 30.8 PG (ref 26.5–33)
MCHC RBC AUTO-ENTMCNC: 31.6 G/DL (ref 31.5–36.5)
MCV RBC AUTO: 98 FL (ref 78–100)
MONOCYTES # BLD AUTO: 0.7 10E9/L (ref 0–1.3)
MONOCYTES NFR BLD AUTO: 11.3 %
NEUTROPHILS # BLD AUTO: 4.4 10E9/L (ref 1.6–8.3)
NEUTROPHILS NFR BLD AUTO: 69.2 %
NRBC # BLD AUTO: 0 10*3/UL
NRBC BLD AUTO-RTO: 0 /100
PLATELET # BLD AUTO: 202 10E9/L (ref 150–450)
RBC # BLD AUTO: 4.09 10E12/L (ref 3.8–5.2)
WBC # BLD AUTO: 6.3 10E9/L (ref 4–11)

## 2020-04-08 PROCEDURE — 40000114 ZZH STATISTIC NO CHARGE CLINIC VISIT

## 2020-04-08 PROCEDURE — 85025 COMPLETE CBC W/AUTO DIFF WBC: CPT | Performed by: INTERNAL MEDICINE

## 2020-04-08 PROCEDURE — 99443 ZZC PHYSICIAN TELEPHONE EVALUATION 21-30 MIN: CPT | Performed by: INTERNAL MEDICINE

## 2020-04-08 PROCEDURE — 36415 COLL VENOUS BLD VENIPUNCTURE: CPT | Performed by: INTERNAL MEDICINE

## 2020-04-08 RX ORDER — IOPAMIDOL 755 MG/ML
103 INJECTION, SOLUTION INTRAVASCULAR ONCE
Status: COMPLETED | OUTPATIENT
Start: 2020-04-08 | End: 2020-04-08

## 2020-04-08 RX ORDER — TRAZODONE HYDROCHLORIDE 50 MG/1
TABLET, FILM COATED ORAL
Qty: 45 TABLET | Refills: 2 | Status: SHIPPED | OUTPATIENT
Start: 2020-04-08 | End: 2020-12-03

## 2020-04-08 RX ADMIN — IOPAMIDOL 103 ML: 755 INJECTION, SOLUTION INTRAVASCULAR at 10:48

## 2020-04-08 NOTE — LETTER
"4/8/2020       RE: Dimple Oviedo  [address]  [facility] [address]     Dear Colleague,    Thank you for referring your patient, Dimple Oviedo, to the [facility]. Please see a copy of my visit note below.    MEDICAL ONCOLOGY VIRTUAL VISIT NOTE    Dimple Oviedo is a 86 year old female who is being evaluated via a billable telephone visit.      The patient has been notified of following:   \"This telephone visit will be conducted via a call between you and your physician/provider. We have found that certain health care needs can be provided without the need for a physical exam.  This service lets us provide the care you need with a short phone conversation.  If a prescription is necessary we can send it directly to your pharmacy.  If lab work is needed we can place an order for that and you can then stop by our lab to have the test done at a later time.    Telephone visits are billed at different rates depending on your insurance coverage. During this emergency period, for some insurers they may be billed the same as an in-person visit.  Please reach out to your insurance provider with any questions.    If during the course of the call the physician/provider feels a telephone visit is not appropriate, you will not be charged for this service.\"     Physician has received verbal consent for a Telephone Visit from the patient? Yes    Dimple Oviedo complains of    Chief Complaint   Patient presents with     Telephone     Urothelial Ca     I have reviewed and updated the patient's Allergy List and Medication List.    ALLERGIES  Codeine     No new concerns. No refills needed.    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    HISTORY OF PRESENT " ILLNESS:  Ms. 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 Her oncologic history is as under.    Ms. Oviedo is doing well overall. States she gained a few pounds. Unable to play cards due to COVID-19, but trying to stay active. The only issue is L knee pain due to OA - due for a steroid injection by PCP. She denies fever, chills, shortness of breath or chest pain. There is no clinical evidence of disease progression.    She follows with Dr. King in urology and last cystoscopy on 10/03/2019 showed small area of erythema. 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 She proceeded with intravesical BCG therapy x6 (2/12-3/18). No clinical evidence of recurrence.     ONCOLOGIC HISTORY:  1. 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   - Jan 2018 to September 2018- Persistent gross hematuria and underwent multiple procedures.    - 2/19/18 and 4/3/18- cystoscopies with bladder biopsies  which were negative for bladder tumor  - 1/17/18, 3/8/18, 7/26/18, 9/10/18- Multiple urine cytologies have come back positive by FISH for high-grade transitional cell carcinoma  - 9/10/18- Underwent a bilateral cystoureteroscopy with biopsy and brushing that showed  right upper tract cytology suspicious for high-grade urothelial ca. Right ureter brushing negative from that day. Left upper tract negative.  - 9/10/18- CT A/P without contrast showed new small bilateral pleural effusions and interstitial pulmonary edema but no evidence of locoregional or metastatic disease.  - 9/12/18- Presented to ED with oliguria, CRESENCIO, and mild hydronephrosis bilaterally on CT scan and underwent bilateral ureteral stent placment  - 11/13/2018- Right robotic nephroureterectomy, excision of sandip-caval mass and LN excision by Stuart King. Pathology  "showed \"Right nephroureterectomy: Invasive high grade urothelial carcinoma measuring 3.5 cm, located in renal pelvis and invading through renal parenchyma into perinephric fat. Urothelial carcinoma in-situ present. Margins free of tumor. Ana-caval mass: Metastatic urothelial carcinoma involving one lymph node with at least 1 cm tumor deposit. Pericaval Extranodal Extension present. Five additional benign pericaval lymph nodes. Pathologic stage: pT4N1 (1 of 6 lymph nodes).\"  - 12/11/18- PET/CT whole body demonstrated significant residual FDG avid disease. For example, \"there is an FDG avid ill-defined pericaval mass immediately medial to the surgical clips measuring approximately 2.0 x 1.4 cm, with max SUV measuring up to12.2, see series 4 image 21. Additional FDG avid retrocaval and interaortocaval lymphadenopathy are noted.There is a 1.2 cm nodule in the right hemipelvis posterior lateral tothe bladder with max SUV measuring 8.3, see series 4 image 77. The bladder is incompletely distended.\" No suspicious thoracic or bone uptake.  - 12/12/18- Started adjuvant chemotherapy (carbo+gem) per POUT trial. Cycle 2 - 1/2/19. Cycle 3 - 1/23/19. Cycle 4 - 02/13/19.  - 1/22/19 - CT C/A/P with contrast compared with prior PET/CT: \"1. New well-circumscribed soft tissue pulmonary nodules of the right lower lobe and left upper lobe. Findings could be infectious, inflammatory, or represent metastatic disease. Continued attention on follow-up recommended. Postoperative changes of right nephrectomy. No evidence for local recurrence in the present study.\"  - 3/1/2019- CT C/A/P with contrast - \"1. Bilateral pulmonary nodules measuring up to 4 mm in the right lower lobe, previously measuring 8 mm. No new or enlarging pulmonary nodules. 2. No convincing evidence for metastatic disease in the abdomen, pelvis and bones.\"  - 6/3/2019- CT C/A/P with contrast - \"1. Surgical changes of right nephrectomy without evidence of residual or " "recurrent disease on this noncontrast exam.  Consider contrast enhanced examination on follow-up. 2. No evidence of metastatic disease in the abdomen, or pelvis on noncontrast CT.  Scattered tiny pulmonary nodules in the chest are not significantly changed.  Previously described prominent right lower lobe pulmonary nodule (previously measuring up to 8 mm) is no longer visualized. 3. Stable bilateral pulmonary nodules measuring maximally 4 mm.\"  - 09/11/19: CT C/A/P without contrast - \"1. Stable exam without evidence convincing for new disease. 2. Stable pulmonary nodules. 3. 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 4. Stable heterogeneous enlargement of the thyroid with underlying nodules suggested.\"  - 10/3/19: Cystoscopy showed a small area of erythema on retroflexion, possibly pre-existing or due to retroflexion of the scope. Urine cytology from that time showed cells suspicious for malignancy.   - 12/4/19: CT C/A/P without contrast - \"No acute change since prior exam. No evidence of recurrent or metastatic disease. Tiny lung nodules are stable. Prior right nephrectomy. Left renal artery aneurysm is stable.\"  - 1/13/20: Bladder biopsy showed high grade urothelial carcinoma in-situ  - 2/12/20-3/18/20: Intravesical BCG therapy  - 04/08/20: CT C/A/P with contrast - GABRIELLE.    REVIEW OF SYSTEMS: 14 point ROS negative other than the symptoms noted above in the HPI.    PAST MEDICAL AND SURGICAL HISTORY:   Past Medical History:   Diagnosis Date     Calculus of kidney      Chemotherapy-induced neutropenia (H) 3/6/2019     Esophageal reflux      GERD (gastroesophageal reflux disease)      Hyperlipidemia LDL goal <130 5/9/2010     Malignant melanoma of skin of trunk, except scrotum (H)      Nonspecific abnormal finding     has living will 2004 -      Nontoxic multinodular goiter     no further eval /tx rec per pt     Osteopenia      Other psoriasis      Personal history of colonic polyps      PMR (polymyalgia " rheumatica) (H)      Stress-induced cardiomyopathy      Undiagnosed cardiac murmurs      Unspecified constipation      Unspecified essential hypertension      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     Past Surgical History:   Procedure Laterality Date     BIOPSY       C NONSPECIFIC PROCEDURE  2005    colonoscopy polyp repeat 2010     COLONOSCOPY  2014     COMBINED CYSTOSCOPY, INSERT STENT URETER(S) Bilateral 9/12/2018    Procedure: COMBINED CYSTOSCOPY, INSERT STENT URETER(S);  Cystoscopy Bilateral ureteral Stent Placement.;  Surgeon: Justin Henry MD;  [facility]     COMBINED CYSTOSCOPY, RETROGRADES, URETEROSCOPY, INSERT STENT Bilateral 4/3/2018    Procedure: COMBINED CYSTOSCOPY, RETROGRADES, URETEROSCOPY, INSERT STENT;;  Surgeon: Stuart King MD;  [facility]     COMBINED CYSTOSCOPY, RETROGRADES, URETEROSCOPY, INSERT STENT Bilateral 9/10/2018    Procedure: COMBINED CYSTOSCOPY, RETROGRADES, URETEROSCOPY, INSERT STENT;  Cystoscopy, Bilateral Ureteroscopy, Bladder Biopsies, Retrogram Pyelograms, Ureteral Washings and brushings, cysview;  Surgeon: Stuart King MD;  [facility]     CYSTOSCOPY, BIOPSY BLADDER INSTILL OPTICAL AGENT N/A 4/3/2018    Procedure: CYSTOSCOPY, BIOPSY BLADDER INSTILL OPTICAL AGENT;  Cystoscopy, Blue Light Cystoscopy, Bladder Biopsies, Bilateral Selective ureteral washings for Cytology, Bilateral Retrograde Pyelograms, Bilateral Ureteroscopy;  Surgeon: Stuart King MD;  [facility]     CYSTOSCOPY, BIOPSY BLADDER, COMBINED N/A 2/19/2018    Procedure: COMBINED CYSTOSCOPY, BIOPSY BLADDER;  Cystoscopy, Bladder Biopsy;  Surgeon: Kenna La MD;  [facility]     CYSTOSCOPY, FULGURATE BLADDER TUMOR, COMBINED N/A 1/13/2020    Procedure: CYSTOSCOPY, WITH  bladder biopsies and fulguration;  Surgeon: Stuart King MD;  [facility]     CYSTOSCOPY, REMOVE STENT(S), COMBINED  11/13/2018    Procedure: Flexible Cystoscopy  with Stent Removal;  Surgeon: Stuart King MD;  [facility]     DAVINCI LYMPHADENECTOMY N/A 11/13/2018    Procedure: Davinci Lymphadenectomy ;  Surgeon: Stuart King MD;  [facility]     DAVINCI NEPHROURETERECTOMY N/A 11/13/2018    Procedure: Right DaVinci Assisted Nephroureterectomy;  Surgeon: Stuart King MD;  [facility]     ENDOSCOPIC ULTRASOUND LOWER GASTROINTESTIONAL TRACT (GI) N/A 10/30/2015    Procedure: ENDOSCOPIC ULTRASOUND LOWER GASTROINTESTIONAL TRACT (GI);  Surgeon: Daniel Jean-Baptiste MD;  [facility]     EYE SURGERY  12/4/17     INSERT PORT VASCULAR ACCESS Right 12/19/2018    Procedure: Chest Port Placement - right;  Surgeon: Stuart Chavez PA-C;  [facility]     IR CHEST PORT PLACEMENT > 5 YRS OF AGE  12/19/2018     IR PORT REMOVAL RIGHT  6/26/2019     LAPAROSCOPIC CHOLECYSTECTOMY WITH CHOLANGIOGRAMS N/A 11/1/2015    Procedure: LAPAROSCOPIC CHOLECYSTECTOMY WITH CHOLANGIOGRAMS;  Surgeon: Tonie Warren MD;  [facility]     REMOVE PORT VASCULAR ACCESS Right 6/26/2019    Procedure: Right Port Removal;  Surgeon: Froilan Irizarry PA-C;  [facility]     SURGICAL HISTORY OF -   1996    malignant melanoma     SURGICAL HISTORY OF -   1968    thyroid nodule     SURGICAL HISTORY OF -       D & C     SOCIAL HISTORY:   Social History     Tobacco Use     Smoking status: Never Smoker     Smokeless tobacco: Never Used   Substance Use Topics     Alcohol use: No     Alcohol/week: 0.0 standard drinks     Drug use: No     FAMILY HISTORY:   Family History   Problem Relation Age of Onset     Cancer Father         dec - esophageal and laryngeal     Heart Disease Mother      Respiratory Mother         dec     Breast Cancer Daughter      Other Cancer Daughter      Thyroid Disease Daughter      Asthma Daughter      Hyperlipidemia Son      Diabetes Son      ALLERGIES:   Allergies   Allergen Reactions     Codeine      CURRENT  "MEDICATIONS:   Current Outpatient Medications:      acetaminophen (TYLENOL) 650 MG CR tablet, Take 1 tablet (650 mg) by mouth every 8 hours as needed for pain (Patient taking differently: Take 1,300 mg by mouth 2 times daily as needed for pain (PT last dose 1.10.2020) ), Disp: 100 tablet, Rfl: 0     alendronate (FOSAMAX) 70 MG tablet, TAKE 1 TABLET EVERY 7 DAYS AT LEAST 60 MIN BEFORE BREAKFAST AS DIRECTED \"SEE PACKAGE FOR ADDITIONAL INSTRUCTIONS\" (Patient taking differently: Take 70 mg by mouth every 7 days TAKE 1 TABLET EVERY 7 DAYS AT LEAST 60 MIN BEFORE BREAKFAST AS DIRECTED \"SEE PACKAGE FOR ADDITIONAL INSTRUCTIONS\" Takes on Mondays), Disp: 12 tablet, Rfl: 3     aspirin 81 MG tablet, Take 81 mg by mouth every evening , Disp: , Rfl:      Calcium Citrate-Vitamin D (CALCIUM + D PO), Take 1 tablet by mouth daily , Disp: , Rfl:      cycloSPORINE (RESTASIS) 0.05 % ophthalmic emulsion, Place 1 drop into both eyes 2 times daily, Disp: , Rfl:      ferrous sulfate 325 (65 Fe) MG TBEC EC tablet, Take 325 mg by mouth every morning , Disp: , Rfl:      furosemide (LASIX) 20 MG tablet, Take 1 tablet (20 mg) by mouth every morning, Disp: 90 tablet, Rfl: 3     irbesartan (AVAPRO) 300 MG tablet, TAKE 1 TABLET EVERY DAY (Patient taking differently: Take 300 mg by mouth every morning TAKE 1 TABLET EVERY DAY), Disp: 90 tablet, Rfl: 3     levofloxacin (LEVAQUIN) 500 MG tablet, Take 1 tablet 6 hours post treatment and 1 tablet each AM following each treatment., Disp: 12 tablet, Rfl: 0     lovastatin (MEVACOR) 40 MG tablet, TAKE 1 TABLET AT BEDTIME (HYPERLIPIDEMIA LDL GOAL BELOW 130), Disp: 90 tablet, Rfl: 3     methimazole (TAPAZOLE) 5 MG tablet, 10 mg alternating with 15 mg every other day (Patient taking differently: Take 5 mg by mouth every morning ), Disp: 225 tablet, Rfl: 3     nitroGLYcerin (NITROSTAT) 0.4 MG sublingual tablet, For chest pain place 1 tablet under the tongue every 5 minutes for 3 doses. If symptoms persist 5 " minutes after 1st dose call 911., Disp: 25 tablet, Rfl: 3     Omega-3 Fatty Acids (FISH OIL) 500 MG CAPS, Take 1 capsule by mouth daily , Disp: , Rfl:      omeprazole (PRILOSEC) 20 MG DR capsule, TAKE 1 CAPSULE EVERY DAY (Patient taking differently: Take 20 mg by mouth every morning TAKE 1 CAPSULE EVERY DAY), Disp: 90 capsule, Rfl: 3     Probiotic Product (PROBIOTIC ADVANCED PO), Take 1 capsule by mouth every morning , Disp: , Rfl:      propranolol ER (INDERAL LA) 60 MG 24 hr capsule, Take 1 capsule (60 mg) by mouth every morning, Disp: 90 capsule, Rfl: 1     rOPINIRole (REQUIP) 0.25 MG tablet, 0.25 mg in the afternoon and 0.5 mg at night (Patient taking differently: Take 0.5 mg by mouth daily (with dinner) ), Disp: 270 tablet, Rfl: 1     sertraline (ZOLOFT) 100 MG tablet, TAKE 1 TABLET (100 MG) BY MOUTH EVERY MORNING, Disp: 90 tablet, Rfl: 2     traZODone (DESYREL) 50 MG tablet, TAKE 1/2 TABLET AT BEDTIME, Disp: 45 tablet, Rfl: 1    PHYSICAL EXAMINATION:  Virtual visit.    LABORATORY DATA:   Lab Test 04/08/20  1027 01/10/20  1357 12/04/19  1419 10/04/19  1115 09/11/19  1242   WBC 6.3 7.7 6.8 5.5 7.5   RBC 4.09 4.13 3.95 3.75* 3.68*   HGB 12.6 13.2 12.6 12.8 12.3   HCT 39.9 41.0 38.8 38.0 37.4   MCV 98 99 98 101* 102*   MCH 30.8 32.0 31.9 34.1* 33.4*   MCHC 31.6 32.2 32.5 33.7 32.9   RDW 12.8 12.6 12.4 12.3 12.4    263 210 200 229   NEUTROPHIL 69.2  --  61.2  --  62.0   NA  --  135 135 138 133   POTASSIUM  --  4.2 4.7 4.2 4.4   CHLORIDE  --  101 103 102 100   CO2  --  31 29 29 24   ANIONGAP  --  3 3 7 9   GLC  --  83 110* 84 90   BUN  --  34* 28 19 24   CR  --  1.19* 1.09* 0.96 1.03   SHREYA  --  9.3 9.1 9.1 8.9   PROTTOTAL  --  7.8 7.3 7.3 7.4   ALBUMIN  --  3.6 3.5 3.8 3.6   BILITOTAL  --  0.4 0.4 0.4 0.3   ALKPHOS  --  112 104 86 98   AST  --  15 14 18 14   ALT  --  26 21 25 17     IMAGING STUDIES:  As above.     ASSESSMENT AND PLAN: Ms. 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    1. Upper tract urothelial cancer:   - She completed 4 cycles of adjuvant carboplatin plus gemcitabine chemotherapy per POUT trial. No residual side effects or evidence of recurrence.  - She did have a high-grade CIS in Dec 2019 cysto which was definitively treated by Dr. King. Concern that this is a drop met, but no evidence of upper tract issues on today's CT.  - Reviewed restaging CT scan from 20 that showed no clear evidence of disease recurrence.   - Continue active surveillance. She's aware that this involves close monitoring for new or worsening signs or symptoms such as shortness of breath, chest pain, abdominal pain, unexplained weight loss, hematuria etc.  - Next restaging CT C/A/P in 4 months.     2. High grade urothelial carcinoma in situ, bladder  - Bx proven carcinoma in situ in 2020, completed the first round of intravesical BCG treatment 2020-3/2020.  - Due for cysto with Dr. King at the end of 2020 - encouraged to keep this appt.     3. Renal artery saccular aneursym:  - Management per IR team.   4. Chemo induced anemia and thrombocytopenia:  - Resolved.   5. History of afib and palpitations:  - Pt encouraged to keep cardiology follow-ups.   Return to clinic in 4 months with restaging CT C/A/P.  All of the above was explained to the patient in lay language and several questions were answered. They are in agreement with the plan.    BILLIN   PHONE VISIT TIME: 25 mins     Jaden Toledo M.D.  . Professor of Medicine  Genitourinary Oncology  Division of Hematology, Oncology & Transplantation  [facility]

## 2020-04-08 NOTE — TELEPHONE ENCOUNTER
"Routing refill request to provider for review/approval because:  --I authorized refill for trazodone but patient is requesting Scr.  --I do not see that Scr is needed?          Prescription approved per [facility] Refill Protocol.  Sana Pro RN      Last Office Visit: 2/24/2020   Future Office Visit:  NONE    Requested Prescriptions   Pending Prescriptions Disp Refills     traZODone (DESYREL) 50 MG tablet 45 tablet 1     Sig: TAKE 1/2 TABLET AT BEDTIME       Serotonin Modulators Passed - 4/6/2020 11:26 AM        Passed - Recent (12 mo) or future (30 days) visit within the authorizing provider's specialty     Patient has had an office visit with the authorizing provider or a provider within the authorizing providers department within the previous 12 mos or has a future within next 30 days. See \"Patient Info\" tab in inbasket, or \"Choose Columns\" in Meds & Orders section of the refill encounter.              Passed - Medication is active on med list        Passed - Patient is age 18 or older        Passed - No active pregnancy on record        Passed - No positive pregnancy test in past 12 months               "

## 2020-04-16 ENCOUNTER — PRE VISIT (OUTPATIENT)
Dept: UROLOGY | Facility: CLINIC | Age: 85
End: 2020-04-16

## 2020-04-16 NOTE — TELEPHONE ENCOUNTER
Chief Complaint : Bladder cancer cysto    Records/Orders: cytology     Pt Contacted: YES moved patient up     At Rooming: urine

## 2020-04-30 ENCOUNTER — PRE VISIT (OUTPATIENT)
Dept: UROLOGY | Facility: CLINIC | Age: 85
End: 2020-04-30

## 2020-04-30 ENCOUNTER — OFFICE VISIT (OUTPATIENT)
Dept: UROLOGY | Facility: CLINIC | Age: 85
End: 2020-04-30
Payer: MEDICARE

## 2020-04-30 DIAGNOSIS — C68.9 UROTHELIAL CANCER (H): Primary | ICD-10-CM

## 2020-04-30 DIAGNOSIS — C68.9 UROTHELIAL CARCINOMA (H): ICD-10-CM

## 2020-04-30 PROCEDURE — 88305 TISSUE EXAM BY PATHOLOGIST: CPT | Performed by: UROLOGY

## 2020-04-30 PROCEDURE — 88112 CYTOPATH CELL ENHANCE TECH: CPT | Performed by: UROLOGY

## 2020-04-30 PROCEDURE — 88120 CYTP URNE 3-5 PROBES EA SPEC: CPT | Performed by: UROLOGY

## 2020-04-30 NOTE — LETTER
2020       RE: Dimple Oviedo  [address]  [facility] [address]     Dear Colleague,    Thank you for referring your patient, Dimple Oviedo, to the [facility]OLOG[facility] AND [facility]ST FOR PROSTATE AND UROLOGIC CANCERS at [facility]. Please see a copy of my visit note below.      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 She completed chemotherapy (gem/carbo).  Stable to no evidence of disease in the upper tracts.    2. 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 Got 6 of BCG and tolerated it well.      Waiting on getting her knee surgery for the BCG to be completed.    Discussed that this type of cancer is very susceptible to drop metastasis and the bladder tumor is likely seeded from the kidney tumor. We will continue to monitor the other kidney.  CT scan 2020 which I reviewed shows no sign of mets.    We will proceed with BCG intravesical therapy, if her biopsy cytology and FISH today are normal will give only three more doses.  If positive she will need 6 more doses.  Her knee surgery is on hold because of COVID    - Biopsy done today during cysto  - BCG x 6 in 4 weeks of biopsy is positive or 3 if negative  - Follow-up in 3 months with cystoscopy, cytology and FISH        Stuart King MD  Department of Urology  [facility]    ______________________________________________________________________    HPI  Dimple Oviedo is a 86 year old female with a history of high grade upper tract  urothelial cancer (pT4N1) here for follow up after right nephroureterectomy on 18. The patient is following yolanda Toledo of Hematology and Oncology.     Per Dr. Toledo's note, on 18 Mr. Oviedo- Started adjuvant chemotherapy (carbo+gem) per POUT trial. Cycle 2 - 19. Cycle 3 - 19. Cycle 4 - 19.    Date of last abdominal and pelvis imagin2019   Date of last chest imagin2019   Any recurrence? cystoscopy on 10/03/2019 showed small area of erythema. Bladder biopsy from 2020 showed urothelial carcinoma in-situ   Intravesical Therapy: BCG X6 2020 (Full Strength)    She has right total knee arthoplasty planned for 2020. She has knee pain but is not crippled by the pain.  This was cancelled to get the BCG done, now on hold for COVID    Review of Systems:   Pertinent items are noted in HPI or as below, remainder of complete ROS is negative.      Physical Exam:   Patient is a 86 year old  female   Vitals: not currently breastfeeding.  General Appearance Adult: Alert, no acute distress, oriented  HENT: throat/mouth:normal, good dentition  Lungs: no respiratory distress, or pursed lip breathing  Heart: No obvious jugular venous distension present  Musculoskeltal: extremities normal  Skin: no visible suspicious lesions or rashes  Neuro: Alert, oriented, speech and mentation normal  Psych: affect and mood normal  Gait: Normal  : deferred    Laboratory:   I reviewed all applicable laboratory and pathology data and went over findings with patient  Significant for     Lab Results   Component Value Date    CR 1.19 01/10/2020    CR 1.09 2019    CR 0.96 10/04/2019    CR 1.03 2019    CR 0.99 2019    CR 1.02 2019    CR 1.00 2019    CR 1.18 2019    CR 0.98 2019    CR 0.99 2019       Results for orders placed or performed during the hospital encounter of 18   UA with Microscopic   Result Value Ref Range    Color Urine Light Yellow      Appearance Urine Clear     Glucose Urine Negative NEG^Negative mg/dL    Bilirubin Urine Negative NEG^Negative    Ketones Urine Negative NEG^Negative mg/dL    Specific Gravity Urine 1.008 1.003 - 1.035    Blood Urine Trace (A) NEG^Negative    pH Urine 5.5 5.0 - 7.0 pH    Protein Albumin Urine Negative NEG^Negative mg/dL    Urobilinogen mg/dL Normal 0.0 - 2.0 mg/dL    Nitrite Urine Negative NEG^Negative    Leukocyte Esterase Urine Negative NEG^Negative    Source Catheterized Urine     WBC Urine 0 0 - 5 /HPF    RBC Urine 0 0 - 2 /HPF       Imaging:   I personally viewed all applicable images, interpreted them, and discussed findings with the patient.     CT 4/2020  IMPRESSION:   1. No evidence of recurrent/metastatic disease in the chest, abdomen,  or pelvis.   2. Stable surgical changes of right nephrectomy/ureterectomy.  3. Unchanged left renal artery aneurysm.  4. Unchanged nonobstructing calyceal stone in the left kidney.  5. Moderate hiatal hernia.    Again, thank you for allowing me to participate in the care of your patient.      Sincerely,    Stuart King MD

## 2020-04-30 NOTE — TELEPHONE ENCOUNTER
Chief Complaint : biopsy follow up     Records/Orders: pathology     Pt Contacted: no    At Rooming: phone appointment

## 2020-04-30 NOTE — NURSING NOTE
"Chief Complaint   Patient presents with     Cystoscopy     Bladder cancer       not currently breastfeeding. There is no height or weight on file to calculate BMI.    Patient Active Problem List   Diagnosis     Esophageal reflux     Restless leg syndrome     heat intolerance     Goiter     Disorder of bone and cartilage     Other psoriasis     perirectal cyst     Malignant melanoma of skin of trunk, except scrotum (H)     Nontoxic multinodular goiter     Hyperlipidemia LDL goal <130     Hypertension goal BP (blood pressure) < 140/90     Urinary incontinence     Hip arthritis     Polymyalgia rheumatica (H)     High risk medication use     Shoulder pain     Impaired fasting blood sugar     Chronic bilateral low back pain without sciatica     Obesity, unspecified obesity severity, unspecified obesity type     Iron deficiency anemia, unspecified iron deficiency anemia type     NSTEMI (non-ST elevated myocardial infarction) (H)     Stress-induced cardiomyopathy     A-fib (H)     Anxiety     Chronic systolic heart failure (H)     Urothelial carcinoma (H)     Hyperthyroidism     Restless legs syndrome     CRESENCIO (acute kidney injury) (H)     Hydronephrosis     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)     Graves disease     Encounter for antineoplastic chemotherapy     Chemotherapy-induced neutropenia (H)     Primary localized osteoarthritis of right knee     Urothelial cancer (H)       Allergies   Allergen Reactions     Codeine        Current Outpatient Medications   Medication Sig Dispense Refill     acetaminophen (TYLENOL) 650 MG CR tablet Take 1 tablet (650 mg) by mouth every 8 hours as needed for pain (Patient taking differently: Take 1,300 mg by mouth 2 times daily as needed for pain (PT last dose 1.10.2020) ) 100 tablet 0     alendronate (FOSAMAX) 70 MG tablet TAKE 1 TABLET EVERY 7 DAYS AT LEAST 60 MIN BEFORE BREAKFAST AS DIRECTED \"SEE PACKAGE FOR ADDITIONAL INSTRUCTIONS\" (Patient taking differently: Take 70 mg " "by mouth every 7 days TAKE 1 TABLET EVERY 7 DAYS AT LEAST 60 MIN BEFORE BREAKFAST AS DIRECTED \"SEE PACKAGE FOR ADDITIONAL INSTRUCTIONS\"  Takes on Mondays) 12 tablet 3     aspirin 81 MG tablet Take 81 mg by mouth every evening        Calcium Citrate-Vitamin D (CALCIUM + D PO) Take 1 tablet by mouth daily        cycloSPORINE (RESTASIS) 0.05 % ophthalmic emulsion Place 1 drop into both eyes 2 times daily       ferrous sulfate 325 (65 Fe) MG TBEC EC tablet Take 325 mg by mouth every morning        furosemide (LASIX) 20 MG tablet Take 1 tablet (20 mg) by mouth every morning 90 tablet 3     irbesartan (AVAPRO) 300 MG tablet TAKE 1 TABLET EVERY DAY (Patient taking differently: Take 300 mg by mouth every morning TAKE 1 TABLET EVERY DAY) 90 tablet 3     lovastatin (MEVACOR) 40 MG tablet TAKE 1 TABLET AT BEDTIME (HYPERLIPIDEMIA LDL GOAL BELOW 130) 90 tablet 3     methimazole (TAPAZOLE) 5 MG tablet 10 mg alternating with 15 mg every other day (Patient taking differently: Take 5 mg by mouth every morning ) 225 tablet 3     nitroGLYcerin (NITROSTAT) 0.4 MG sublingual tablet For chest pain place 1 tablet under the tongue every 5 minutes for 3 doses. If symptoms persist 5 minutes after 1st dose call 911. 25 tablet 3     Omega-3 Fatty Acids (FISH OIL) 500 MG CAPS Take 1 capsule by mouth daily        omeprazole (PRILOSEC) 20 MG DR capsule TAKE 1 CAPSULE EVERY DAY (Patient taking differently: Take 20 mg by mouth every morning TAKE 1 CAPSULE EVERY DAY) 90 capsule 3     Probiotic Product (PROBIOTIC ADVANCED PO) Take 1 capsule by mouth every morning        propranolol ER (INDERAL LA) 60 MG 24 hr capsule Take 1 capsule (60 mg) by mouth every morning 90 capsule 1     rOPINIRole (REQUIP) 0.25 MG tablet 0.25 mg in the afternoon and 0.5 mg at night (Patient taking differently: Take 0.5 mg by mouth daily (with dinner) ) 270 tablet 1     sertraline (ZOLOFT) 100 MG tablet TAKE 1 TABLET (100 MG) BY MOUTH EVERY MORNING 90 tablet 2     traZODone " (DESYREL) 50 MG tablet TAKE 1/2 TABLET AT BEDTIME 45 tablet 2     levofloxacin (LEVAQUIN) 500 MG tablet Take 1 tablet 6 hours post treatment and 1 tablet each AM following each treatment. 12 tablet 0       Social History     Tobacco Use     Smoking status: Never Smoker     Smokeless tobacco: Never Used   Substance Use Topics     Alcohol use: No     Alcohol/week: 0.0 standard drinks     Drug use: No       Invasive Procedure Safety Checklist:    Procedure: Cystoscopy    Action: Complete sections and checkboxes as appropriate.    Pre-procedure:  1. Patient ID Verified with 2 identifiers (Bettie and  or MRN) : YES    2. Procedure and site verified with patient/designee (when able) : YES    3. Accurate consent documentation in medical record : YES    4. H&P (or appropriate assessment) documented in medical record : N/A  H&P must be up to 30 days prior to procedure an updated within 24 hours of                 Procedure as applicable.     5. Relevant diagnostic and radiology test results appropriately labeled and displayed as applicable : YES    6. Blood products, implants, devices, and/or special equipment available for the procedure as applicable : YES    7. Procedure site(s) marked with provider initials [Exclusions: none] : NO    8. Marking not required. Reason : Yes  Procedure does not require site marking    Time Out:     Time-Out performed immediately prior to starting procedure, including verbal and active participation of all team members addressing: YES    1. Correct patient identity.  2. Confirmed that the correct side and site are marked.  3. An accurate procedure to be done.  4. Agreement on the procedure to be done.  5. Correct patient position.  6. Relevant images and results are properly labeled and appropriately displayed.  7. The need to administer antibiotics or fluids for irrigation purposes during the procedure as applicable.  8. Safety precautions based on patient history or medication  use.    During Procedure: Verification of correct person, site, and procedure occurs any time the responsibility for care of the patient is transferred to another member of the care team.    The following medication was given:     MEDICATION:  Lidocaine without epinephrine 2% jelly  ROUTE: Urethral  SITE: Urethra via the meatus  DOSE: 10 mL  LOT #: [id number]  : [employer]  EXPIRATION DATE:   NDC#: [address]-47   Was there drug waste? No    Prior to med admin, verified patient identity using patient's name and date of birth.  Due to med administration, patient instructed to remain in clinic for 15 minutes  afterwards, and to report any adverse reaction to me immediately.    Drug Amount Wasted:  None.  Vial/Syringe: Syringe      Yajaira Rios  4/30/2020  10:51 AM

## 2020-04-30 NOTE — NURSING NOTE
The following medication was given:     MEDICATION:  Ciprofloxacin  ROUTE: PO  SITE: Medication was given orally   DOSE: 500 mg  LOT #: [id number]  : [employer]  EXPIRATION DATE: 3/2021  NDC#: 50609 [id number]   Was there drug waste? No    Prior to med admin, verified patient identity using patient's name and date of birth.  Due to med administration, patient instructed to remain in clinic for 15 minutes  afterwards, and to report any adverse reaction to me immediately.    Drug Amount Wasted:  None.  Vial/Syringe: Single dose vial    Yajaira Rois  April 30, 2020  11:34 AM

## 2020-04-30 NOTE — PROGRESS NOTES
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 She completed chemotherapy (gem/carbo).  Stable to no evidence of disease in the upper tracts.    2. 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 Got 6 of BCG and tolerated it well.      Waiting on getting her knee surgery for the BCG to be completed.    Discussed that this type of cancer is very susceptible to drop metastasis and the bladder tumor is likely seeded from the kidney tumor. We will continue to monitor the other kidney.  CT scan 2020 which I reviewed shows no sign of mets.    We will proceed with BCG intravesical therapy, if her biopsy cytology and FISH today are normal will give only three more doses.  If positive she will need 6 more doses.  Her knee surgery is on hold because of COVID    - Biopsy done today during cysto  - BCG x 6 in 4 weeks of biopsy is positive or 3 if negative  - Follow-up in 3 months with cystoscopy, cytology and FISH        Stuart King MD  Department of Urology  [facility]    ______________________________________________________________________    HPI  Dimple Oviedo is a 86 year old female with a history of high grade upper tract urothelial cancer (pT4N1) here for follow up after right nephroureterectomy on 18. The patient is following wit Dr. Toledo of Hematology and Oncology.     Per Dr. Toledo's note, on 18 Mr. Oviedo- Started adjuvant chemotherapy (carbo+gem) per POUT trial. Cycle 2 - 19. Cycle 3 - 19. Cycle 4 - 19.    Date of last  abdominal and pelvis imagin2019   Date of last chest imagin2019   Any recurrence? cystoscopy on 10/03/2019 showed small area of erythema. Bladder biopsy from 2020 showed urothelial carcinoma in-situ   Intravesical Therapy: BCG X6 2020 (Full Strength)    She has right total knee arthoplasty planned for 2020. She has knee pain but is not crippled by the pain.  This was cancelled to get the BCG done, now on hold for COVID    Review of Systems:   Pertinent items are noted in HPI or as below, remainder of complete ROS is negative.      Physical Exam:   Patient is a 86 year old  female   Vitals: not currently breastfeeding.  General Appearance Adult: Alert, no acute distress, oriented  HENT: throat/mouth:normal, good dentition  Lungs: no respiratory distress, or pursed lip breathing  Heart: No obvious jugular venous distension present  Musculoskeltal: extremities normal  Skin: no visible suspicious lesions or rashes  Neuro: Alert, oriented, speech and mentation normal  Psych: affect and mood normal  Gait: Normal  : deferred    Laboratory:   I reviewed all applicable laboratory and pathology data and went over findings with patient  Significant for     Lab Results   Component Value Date    CR 1.19 01/10/2020    CR 1.09 2019    CR 0.96 10/04/2019    CR 1.03 2019    CR 0.99 2019    CR 1.02 2019    CR 1.00 2019    CR 1.18 2019    CR 0.98 2019    CR 0.99 2019         Imaging:   I personally viewed all applicable images, interpreted them, and discussed findings with the patient.     CT 2020  IMPRESSION:   1. No evidence of recurrent/metastatic disease in the chest, abdomen,  or pelvis.   2. Stable surgical changes of right nephrectomy/ureterectomy.  3. Unchanged left renal artery aneurysm.  4. Unchanged nonobstructing calyceal stone in the left kidney.  5. Moderate hiatal hernia.    CYSTOSCOPY PROCEDURE:  After sterile preparation and draping of the  patient,  a 16-[place] flexible cystoscope was introduced via the urethra.  It was passed without difficulty into the bladder.  The urethra was open without evidence of stricture.  The ureteral orifices were orthotopic.  The bladder mucosa was evaluated using both antegrade and retroflex views and this showed a small reddened area.  We biopsied this and used some cautery to stop the bleeding.  There were no stones.

## 2020-05-02 DIAGNOSIS — G25.81 RESTLESS LEGS SYNDROME: ICD-10-CM

## 2020-05-02 LAB — COPATH REPORT: NORMAL

## 2020-05-04 ENCOUNTER — TELEPHONE (OUTPATIENT)
Dept: UROLOGY | Facility: CLINIC | Age: 85
End: 2020-05-04

## 2020-05-04 NOTE — TELEPHONE ENCOUNTER
[facility] Call Center    Phone Message    May a detailed message be left on voicemail: no     Reason for Call: Other: Patient called said she wanted to now if her telephone appointment can be a conference with family? Please call to inform if that is possible and how it can be done.     Action Taken: Other: p Urology    Travel Screening: Not Applicable

## 2020-05-05 RX ORDER — ROPINIROLE 0.25 MG/1
TABLET, FILM COATED ORAL
Qty: 270 TABLET | Refills: 1 | Status: SHIPPED | OUTPATIENT
Start: 2020-05-05 | End: 2020-11-04

## 2020-05-05 NOTE — TELEPHONE ENCOUNTER
Failed RN protocol due to BP    BP Readings from Last 3 Encounters:   03/18/20 (!) 159/68   03/11/20 (!) 147/55   03/04/20 (!) 142/64     Refill request to MD/primary care provider.  Radha See RN

## 2020-05-05 NOTE — TELEPHONE ENCOUNTER
Patient calling to find out status of this refill from 5-2-20.  Please call with any questions.  OK to LM on VM.

## 2020-05-07 ENCOUNTER — VIRTUAL VISIT (OUTPATIENT)
Dept: UROLOGY | Facility: CLINIC | Age: 85
End: 2020-05-07
Payer: MEDICARE

## 2020-05-07 DIAGNOSIS — C68.9 UROTHELIAL CANCER (H): Primary | ICD-10-CM

## 2020-05-07 NOTE — PROGRESS NOTES
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 She completed chemotherapy (gem/carbo).  Stable to no evidence of disease in the upper tracts.    2. 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 Got 6 of BCG and tolerated it well. Now with negative biopsies 2020      Waiting on getting her knee surgery for the BCG to be completed.    Discussed that this type of cancer is very susceptible to drop metastasis and the bladder tumor is likely seeded from the kidney tumor. We will continue to monitor the other kidney.  CT scan 2020 which I reviewed shows no sign of mets.    We will proceed with BCG intravesical therapy,    - Biopsy done last week is negative  - BCG x 3 in 2 weeks Full strength   Full strength BCG is    BCG Lady 50 mg intravesicle, instill and hold for 2 hours once a week for 6 total weeks.  BCG can be delayed up to two weeks between doses without risk of poorer cancer outcomes.    - Okay to proceed with the knee surgery after completion of the BCG later   - Follow-up in 3 months with cystoscopy, cytology and FISH      Stuart King MD  Department of Urology  [facility]    ______________________________________________________________________    HPI  Dimple Oviedo is a 86 year old female with a history of high grade upper tract urothelial cancer (pT4N1) here for follow up after right nephroureterectomy on 18. The patient is following wit Dr. Toledo of Hematology and Oncology.     Per Dr. Toledo's note, on 18 Mr. Oviedo- Started  adjuvant chemotherapy (carbo+gem) per POUT trial. Cycle 2 - 19. Cycle 3 - 19. Cycle 4 - 19.    Date of last abdominal and pelvis imagin2019   Date of last chest imagin2019   Any recurrence? cystoscopy on 10/03/2019 showed small area of erythema. Bladder biopsy from 2020 showed urothelial carcinoma in-situ , negative biopsy 2020  Intravesical Therapy: BCG X6 2020 (Full Strength)    She has right total knee arthoplasty planned for 2020. She has knee pain but is not crippled by the pain.  This was cancelled to get the BCG done, now on hold for COVID    Review of Systems:   Pertinent items are noted in HPI or as below, remainder of complete ROS is negative.      Telephone Visit    Laboratory:   I reviewed all applicable laboratory and pathology data and went over findings with patient  Significant for     Lab Results   Component Value Date    CR 1.19 01/10/2020    CR 1.09 2019    CR 0.96 10/04/2019    CR 1.03 2019    CR 0.99 2019    CR 1.02 2019    CR 1.00 2019    CR 1.18 2019    CR 0.98 2019    CR 0.99 2019     Imaging:   I personally viewed all applicable images, interpreted them, and discussed findings with the patient.     CT 2020  IMPRESSION:   1. No evidence of recurrent/metastatic disease in the chest, abdomen,  or pelvis.   2. Stable surgical changes of right nephrectomy/ureterectomy.  3. Unchanged left renal artery aneurysm.  4. Unchanged nonobstructing calyceal stone in the left kidney.  5. Moderate hiatal hernia.

## 2020-05-07 NOTE — PROGRESS NOTES
"Dimple Oviedo is a 86 year old female who is being evaluated via a billable telephone visit.      Please Call # [phone]     She would like to conference call with her son at #   [phone]         The patient has been notified of following:     \"This telephone visit will be conducted via a call between you and your physician/provider. We have found that certain health care needs can be provided without the need for a physical exam.  This service lets us provide the care you need with a short phone conversation.  If a prescription is necessary we can send it directly to your pharmacy.  If lab work is needed we can place an order for that and you can then stop by our lab to have the test done at a later time.    Telephone visits are billed at different rates depending on your insurance coverage. During this emergency period, for some insurers they may be billed the same as an in-person visit.  Please reach out to your insurance provider with any questions.    If during the course of the call the physician/provider feels a telephone visit is not appropriate, you will not be charged for this service.\"    Patient has given verbal consent for Telephone visit?  Yes 11:58 AM 05/07/20     How would you like to obtain your AVS? MyChart      Phone call duration: 9 minutes    Stuart King MD      "

## 2020-05-07 NOTE — NURSING NOTE
Dimple Oviedo has given verbal permission for a phone visit 05/07/20 12:00 PM and would like to be reached at # [phone],    She would like to conference call with her son at #   [phone]             Edward Wright, EMT        Called the pt 05/07/20, and 12:00 PM and reviewed their medications, history, and allergies. I then asked that they be in a quiet location with limed distractions so they can hear the provider well.    Chief Complaint   Patient presents with     Telemedicine consult     bladder biopsy follow up        not currently breastfeeding. There is no height or weight on file to calculate BMI.    Patient Active Problem List   Diagnosis     Esophageal reflux     Restless leg syndrome     heat intolerance     Goiter     Disorder of bone and cartilage     Other psoriasis     perirectal cyst     Malignant melanoma of skin of trunk, except scrotum (H)     Nontoxic multinodular goiter     Hyperlipidemia LDL goal <130     Hypertension goal BP (blood pressure) < 140/90     Urinary incontinence     Hip arthritis     Polymyalgia rheumatica (H)     High risk medication use     Shoulder pain     Impaired fasting blood sugar     Chronic bilateral low back pain without sciatica     Obesity, unspecified obesity severity, unspecified obesity type     Iron deficiency anemia, unspecified iron deficiency anemia type     NSTEMI (non-ST elevated myocardial infarction) (H)     Stress-induced cardiomyopathy     A-fib (H)     Anxiety     Chronic systolic heart failure (H)     Urothelial carcinoma (H)     Hyperthyroidism     Restless legs syndrome     CRESENCIO (acute kidney injury) (H)     Hydronephrosis     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)     Graves disease     Encounter for antineoplastic chemotherapy     Chemotherapy-induced neutropenia (H)     Primary localized osteoarthritis of right knee     Urothelial cancer (H)       Allergies   Allergen Reactions     Codeine        Current Outpatient Medications  "  Medication Sig Dispense Refill     acetaminophen (TYLENOL) 650 MG CR tablet Take 1 tablet (650 mg) by mouth every 8 hours as needed for pain (Patient taking differently: Take 1,300 mg by mouth 2 times daily as needed for pain (PT last dose 1.10.2020) ) 100 tablet 0     alendronate (FOSAMAX) 70 MG tablet TAKE 1 TABLET EVERY 7 DAYS AT LEAST 60 MIN BEFORE BREAKFAST AS DIRECTED \"SEE PACKAGE FOR ADDITIONAL INSTRUCTIONS\" (Patient taking differently: Take 70 mg by mouth every 7 days TAKE 1 TABLET EVERY 7 DAYS AT LEAST 60 MIN BEFORE BREAKFAST AS DIRECTED \"SEE PACKAGE FOR ADDITIONAL INSTRUCTIONS\"  Takes on Mondays) 12 tablet 3     aspirin 81 MG tablet Take 81 mg by mouth every evening        Calcium Citrate-Vitamin D (CALCIUM + D PO) Take 1 tablet by mouth daily        cycloSPORINE (RESTASIS) 0.05 % ophthalmic emulsion Place 1 drop into both eyes 2 times daily       ferrous sulfate 325 (65 Fe) MG TBEC EC tablet Take 325 mg by mouth every morning        furosemide (LASIX) 20 MG tablet Take 1 tablet (20 mg) by mouth every morning 90 tablet 3     irbesartan (AVAPRO) 300 MG tablet TAKE 1 TABLET EVERY DAY (Patient taking differently: Take 300 mg by mouth every morning TAKE 1 TABLET EVERY DAY) 90 tablet 3     lovastatin (MEVACOR) 40 MG tablet TAKE 1 TABLET AT BEDTIME (HYPERLIPIDEMIA LDL GOAL BELOW 130) 90 tablet 3     methimazole (TAPAZOLE) 5 MG tablet 10 mg alternating with 15 mg every other day (Patient taking differently: Take 5 mg by mouth every morning ) 225 tablet 3     nitroGLYcerin (NITROSTAT) 0.4 MG sublingual tablet For chest pain place 1 tablet under the tongue every 5 minutes for 3 doses. If symptoms persist 5 minutes after 1st dose call 911. 25 tablet 3     Omega-3 Fatty Acids (FISH OIL) 500 MG CAPS Take 1 capsule by mouth daily        omeprazole (PRILOSEC) 20 MG DR capsule TAKE 1 CAPSULE EVERY DAY (Patient taking differently: Take 20 mg by mouth every morning TAKE 1 CAPSULE EVERY DAY) 90 capsule 3     Probiotic " Product (PROBIOTIC ADVANCED PO) Take 1 capsule by mouth every morning        propranolol ER (INDERAL LA) 60 MG 24 hr capsule Take 1 capsule (60 mg) by mouth every morning 90 capsule 1     rOPINIRole (REQUIP) 0.25 MG tablet TAKE 1 TABLET IN THE AFTERNOON AND TAKE 2 TABLETS EVERY NIGHT 270 tablet 1     sertraline (ZOLOFT) 100 MG tablet TAKE 1 TABLET (100 MG) BY MOUTH EVERY MORNING 90 tablet 2     traZODone (DESYREL) 50 MG tablet TAKE 1/2 TABLET AT BEDTIME 45 tablet 2     levofloxacin (LEVAQUIN) 500 MG tablet Take 1 tablet 6 hours post treatment and 1 tablet each AM following each treatment. 12 tablet 0       Social History     Tobacco Use     Smoking status: Never Smoker     Smokeless tobacco: Never Used   Substance Use Topics     Alcohol use: No     Alcohol/week: 0.0 standard drinks     Drug use: No       Edward Wright, EMT,  5/7/2020  12:00 PM

## 2020-05-07 NOTE — PATIENT INSTRUCTIONS
Our team will reach out to you regarding your BCG treatment. You'll be okay to proceed with knee surgery following the completion of BCG treatment. Please follow up in 3 months for a cystoscopy.     It was a pleasure meeting with you today.  Thank you for allowing me and my team the privilege of caring for you today.  YOU are the reason we are here, and I truly hope we provided you with the excellent service you deserve.  Please let us know if there is anything else we can do for you so that we can be sure you are leaving completely satisfied with your care experience.        Royce Chopra, EMT

## 2020-05-08 DIAGNOSIS — E04.2 NONTOXIC MULTINODULAR GOITER: ICD-10-CM

## 2020-05-11 ENCOUNTER — TELEPHONE (OUTPATIENT)
Dept: ORTHOPEDICS | Facility: CLINIC | Age: 85
End: 2020-05-11

## 2020-05-11 NOTE — TELEPHONE ENCOUNTER
Received voicemail from patient requesting a call back to discuss rescheduling surgery with Dr. Otero. Returned phone call to patient and informed her that I have sent a message to Dr. Otero to follow up on what the plan is after Dimple finishes Chemo. Patients states she was told to schedule surgery for 2 weeks after last chemo treatment. Patient was informed that I need to get clarification from Dr. Otero now that we have different protocols due to COVID. Patient stated understanding.

## 2020-05-13 RX ORDER — METHIMAZOLE 5 MG/1
TABLET ORAL
Qty: 225 TABLET | Refills: 3 | OUTPATIENT
Start: 2020-05-13

## 2020-05-14 DIAGNOSIS — C68.9 UROTHELIAL CANCER (H): Primary | ICD-10-CM

## 2020-05-14 DIAGNOSIS — C66.1 UROTHELIAL CARCINOMA OF RIGHT DISTAL URETER (H): ICD-10-CM

## 2020-05-14 DIAGNOSIS — C68.9 UROTHELIAL CARCINOMA (H): ICD-10-CM

## 2020-05-14 LAB — COPATH REPORT: NORMAL

## 2020-05-14 RX ORDER — LEVOFLOXACIN 500 MG/1
TABLET, FILM COATED ORAL
Qty: 6 TABLET | Refills: 6 | Status: SHIPPED | OUTPATIENT
Start: 2020-05-14 | End: 2020-05-20

## 2020-05-14 RX ORDER — LIDOCAINE HYDROCHLORIDE 20 MG/ML
10 JELLY TOPICAL
Status: CANCELLED | OUTPATIENT
Start: 2020-06-03

## 2020-05-14 RX ORDER — LIDOCAINE HYDROCHLORIDE 20 MG/ML
10 JELLY TOPICAL
Status: CANCELLED | OUTPATIENT
Start: 2020-05-27

## 2020-05-14 RX ORDER — LIDOCAINE HYDROCHLORIDE 20 MG/ML
10 JELLY TOPICAL
Status: CANCELLED | OUTPATIENT
Start: 2020-05-20

## 2020-05-20 ENCOUNTER — INFUSION THERAPY VISIT (OUTPATIENT)
Dept: ONCOLOGY | Facility: CLINIC | Age: 85
End: 2020-05-20
Attending: UROLOGY
Payer: MEDICARE

## 2020-05-20 VITALS
RESPIRATION RATE: 16 BRPM | OXYGEN SATURATION: 94 % | DIASTOLIC BLOOD PRESSURE: 63 MMHG | TEMPERATURE: 98.1 F | SYSTOLIC BLOOD PRESSURE: 150 MMHG | HEART RATE: 62 BPM

## 2020-05-20 DIAGNOSIS — C68.9 UROTHELIAL CANCER (H): ICD-10-CM

## 2020-05-20 DIAGNOSIS — C66.1 UROTHELIAL CARCINOMA OF RIGHT DISTAL URETER (H): ICD-10-CM

## 2020-05-20 DIAGNOSIS — C68.9 UROTHELIAL CANCER (H): Primary | ICD-10-CM

## 2020-05-20 DIAGNOSIS — C68.9 UROTHELIAL CARCINOMA (H): ICD-10-CM

## 2020-05-20 LAB
ALBUMIN UR-MCNC: NEGATIVE MG/DL
APPEARANCE UR: CLEAR
BILIRUB UR QL STRIP: NEGATIVE
COLOR UR AUTO: YELLOW
GLUCOSE UR STRIP-MCNC: NEGATIVE MG/DL
HGB UR QL STRIP: NEGATIVE
KETONES UR STRIP-MCNC: NEGATIVE MG/DL
LEUKOCYTE ESTERASE UR QL STRIP: NEGATIVE
NITRATE UR QL: NEGATIVE
PH UR STRIP: 5 PH (ref 5–7)
SOURCE: NORMAL
SP GR UR STRIP: 1.01 (ref 1–1.03)
UROBILINOGEN UR STRIP-MCNC: 0 MG/DL (ref 0–2)

## 2020-05-20 PROCEDURE — 25000128 H RX IP 250 OP 636: Mod: ZF | Performed by: UROLOGY

## 2020-05-20 PROCEDURE — 51720 TREATMENT OF BLADDER LESION: CPT

## 2020-05-20 PROCEDURE — 81003 URINALYSIS AUTO W/O SCOPE: CPT | Performed by: UROLOGY

## 2020-05-20 RX ORDER — LEVOFLOXACIN 500 MG/1
TABLET, FILM COATED ORAL
Qty: 6 TABLET | Refills: 6 | Status: SHIPPED | OUTPATIENT
Start: 2020-05-20 | End: 2020-10-21

## 2020-05-20 RX ADMIN — BACILLUS CALMETTE-GUERIN 50 MG: 50 POWDER, FOR SUSPENSION INTRAVESICAL at 13:23

## 2020-05-20 ASSESSMENT — PAIN SCALES - GENERAL: PAINLEVEL: MODERATE PAIN (5)

## 2020-05-20 NOTE — PATIENT INSTRUCTIONS
Home discharge instructions:  -To make sure each treatment has the best chance of working, follow these steps 2 hours after each treatment                        1. Lie on your back for 15 minues                        2. Lie on your left side for 15 mintues                        3. Lie on your right side for 15 minutes                        4. Get up but keep the medication  In your bladder for 60 more minutes- for a total of 2 hours                        5. Empty your bladder following the directions below (if you have difficulty holding your bladder it is ok to empty your bladder early)  -Wear pad if incontinence is a possibliity  -Wash hands throughly after using the bathroom  -For safety reasons remember to follow these directions for 6 hours after each treatment:                        1. Sit on the toilet seat to urinate                        2. Immediately after urinating- add 2 cups of undiluted chlorine bleach to the toilet                         3. Close lid and let the bleach sit for 15 minutes each time                        4. Drink plenty of water to flush any remaining medication out of your bladder     **These steps will help kill all the BCG bacteria and disinfect the toilet**       Take your antibiotic today at 7:30pm and tomorrow morning.        Please call urology triage line at [phone] or [phone] with fevers or chills, burning with urination, or blood in your urine which lasts more than a 48-72 hours or with any question or concerns.

## 2020-05-20 NOTE — PROGRESS NOTES
Infusion Nursing Note:  Dimple Oviedo presents today for Maintenance BCG.  Instillation number 1 of 3.   Patient seen by provider today: No   present during visit today: Not Applicable.    Note: Patient presents to infusion today stating she feels well. She denies any urinary urgency, frequency, hematuria, fevers/chills, or any other new symptoms or concerns. Patient reports pain in her knee today which has been an ongoing issue. She states she is currently waiting for surgery on her knee once BCG treatments for this cycle are completed. She rates her pain 4/10 and denies the need for any intervention for pain today.     Levaquin prescription was sent to [facility] pharmacy and patient was unaware so she did not  the prescription. She requested it be refilled here. Urology triage notified for request of new prescription of Levaquin to be filled at our pharmacy downstairs. [facility] pharmacy was notified by writer to reverse the filled prescription at their pharmacy.     Treatment Conditions:   Patient states no concerns or issues at this time.    No visible blood noted in today's urine sample.   Ok to proceed with treatment.     Pre-procedure Assessment:  Dysuria: No  Hematuria: No  Fever/chills: No  Increased urinary urgency: No  Increased urinary frequency: No    Labs Reviewed:  Urine analysis.  Results meet treatment conditions.     UA RESULTS:  Lab Test 05/20/20  1215   COLOR Yellow   APPEARANCE Clear   URINEGLC Negative   URINEBILI Negative   URINEKETONE Negative   SG 1.012   UBLD Negative   URINEPH 5.0   PROTEIN Negative   UROBILINOGEN  --    NITRITE Negative   LEUKEST Negative   RBCU  --    WBCU  --          Procedure:  16F Coude catheter placed.  Catheter placed without trauma.  Clear/yellow urine drained from bladder.    Patient turned every 15 minutes per protocol: Yes, turning completed at clinic per protocol.  Patient tolerated instillation without incident for two hours.    Patient  instructed on the following and verbalized understanding:   Post instillation side effects and precautions discussed: YES    Discharge Plan:   Prescription refills given for Levaquin.  Discharge instructions reviewed with: Patient.  Patient and/or family verbalized understanding of discharge instructions and all questions answered.  Copy of AVS reviewed with patient and/or family.  Patient will return 5/27/20 for next appointment.  Patient discharged in stable condition accompanied by: self.  Departure Mode: Ambulatory.    Tatiana Lopez RN

## 2020-05-27 ENCOUNTER — INFUSION THERAPY VISIT (OUTPATIENT)
Dept: ONCOLOGY | Facility: CLINIC | Age: 85
End: 2020-05-27
Attending: UROLOGY
Payer: MEDICARE

## 2020-05-27 DIAGNOSIS — C66.1 UROTHELIAL CARCINOMA OF RIGHT DISTAL URETER (H): ICD-10-CM

## 2020-05-27 DIAGNOSIS — C68.9 UROTHELIAL CARCINOMA (H): ICD-10-CM

## 2020-05-27 DIAGNOSIS — C68.9 UROTHELIAL CANCER (H): Primary | ICD-10-CM

## 2020-05-27 PROCEDURE — 81003 URINALYSIS AUTO W/O SCOPE: CPT | Performed by: UROLOGY

## 2020-05-27 PROCEDURE — 51720 TREATMENT OF BLADDER LESION: CPT

## 2020-05-27 PROCEDURE — 25000128 H RX IP 250 OP 636: Mod: ZF | Performed by: UROLOGY

## 2020-05-27 RX ADMIN — BACILLUS CALMETTE-GUERIN 50 MG: 50 POWDER, FOR SUSPENSION INTRAVESICAL at 13:12

## 2020-05-27 ASSESSMENT — PAIN SCALES - GENERAL: PAINLEVEL: NO PAIN (0)

## 2020-05-27 NOTE — PATIENT INSTRUCTIONS
Home discharge instructions:  -To make sure each treatment has the best chance of working, follow these steps 2 hours after each treatment                        1. Lie on your back for 15 minues                        2. Lie on your left side for 15 mintues                        3. Lie on your right side for 15 minutes                        4. Get up but keep the medication  In your bladder for 60 more minutes- for a total of 2 hours                        5. Empty your bladder following the directions below (if you have difficulty holding your bladder it is ok to empty your bladder early)  -Wear pad if incontinence is a possibliity  -Wash hands throughly after using the bathroom  -For safety reasons remember to follow these directions for 6 hours after each treatment:                        1. Sit on the toilet seat to urinate                        2. Immediately after urinating- add 2 cups of undiluted chlorine bleach to the toilet                         3. Close lid and let the bleach sit for 15 minutes each time                        4. Drink plenty of water to flush any remaining medication out of your bladder     **These steps will help kill all the BCG bacteria and disinfect the toilet**       Take your antibiotic today at 7:15pm and tomorrow morning.        Please call urology triage line at [phone] or [phone] with fevers or chills, burning with urination, or blood in your urine which lasts more than a 48-72 hours or with any question or concerns.

## 2020-05-27 NOTE — PROGRESS NOTES
Infusion Nursing Note:  Dimple Oviedo presents today for Maintenance BCG .  Instillation number 2 of 3.   Patient seen by provider today: No   present during visit today: Not Applicable.    Note: Patient presents to infusion today stating she feels well. Following last week's treatment, she felt fatigued for 24 hours, but denies any other symptoms or changes. She denies any pain today. She confirmed she took Levaquin as prescribed last week and has enough tablets for remaining treatments.     Treatment Conditions:   Patient states no concerns or issues at this time.  No visible blood noted in today's urine sample.  Ok to proceed with treatment.     Pre-procedure Assessment:  Dysuria: No  Hematuria: No  Fever/chills: No  Increased urinary urgency: No  Increased urinary frequency: No    Labs Reviewed:  Urine analysis.  Results meet treatment conditions.     UA RESULTS:  Lab Test 05/27/20  1225   COLOR Straw   APPEARANCE Clear   URINEGLC Negative   URINEBILI Negative   URINEKETONE Negative   SG 1.006   UBLD Negative   URINEPH 6.0   PROTEIN Negative   UROBILINOGEN  --    NITRITE Negative   LEUKEST Negative   RBCU  --    WBCU  --        Procedure:  16F Diehl catheter placed.  Catheter placed without trauma.  Clear/yellow urine drained from bladder.    Patient turned every 15 minutes per protocol: Yes, turning completed at clinic per protocol.  Patient tolerated instillation without incident for two hours.     Patient instructed on the following and verbalized understanding:   Post instillation side effects and precautions discussed: YES    Discharge Plan:   Patient declined prescription refills.  Discharge instructions reviewed with: Patient.  Patient and/or family verbalized understanding of discharge instructions and all questions answered.  Copy of AVS reviewed with patient and/or family.  Patient will return 6/3/20 for next appointment.  Patient discharged in stable condition accompanied by:  self.  Departure Mode: Ambulatory.    Tatiana Lopez RN

## 2020-05-28 ENCOUNTER — TELEPHONE (OUTPATIENT)
Dept: ORTHOPEDICS | Facility: CLINIC | Age: 85
End: 2020-05-28

## 2020-05-28 NOTE — TELEPHONE ENCOUNTER
Received message from patient that she would like to reschedule surgery with Dr. Otero. Returned phone call to portia and explained that while we have started to reschedule some surgeries I currently do not have a surgery date for her at this time. Patient was informed that once I have a surgery date I will be in contact with her. I also informed patient that the clinic is still waiting to hear back from Dr. King on clearance for surgery.

## 2020-06-01 NOTE — TELEPHONE ENCOUNTER
[facility] Call Center    Phone Message    May a detailed message be left on voicemail: yes     Reason for Call: Other:   Pt would like to speak  To Lynn about her knee. Pt is in a lot of pain and would like to discuss next steps.     Action Taken: Message routed to:  [facility] ortho    Travel Screening: Not Applicable                                                                         Detail Level: Detailed Render Note In Bullet Format When Appropriate: No Consent: The patient's consent was obtained including but not limited to risks of crusting, scabbing, blistering, scarring, darker or lighter pigmentary change, recurrence, incomplete removal and infection. Post-Care Instructions: I reviewed with the patient in detail post-care instructions. Patient is to wear sunprotection, and avoid picking at any of the treated lesions. Pt may apply Vaseline to crusted or scabbing areas. Duration Of Freeze Thaw-Cycle (Seconds): 0

## 2020-06-03 ENCOUNTER — INFUSION THERAPY VISIT (OUTPATIENT)
Dept: ONCOLOGY | Facility: CLINIC | Age: 85
End: 2020-06-03
Attending: UROLOGY
Payer: MEDICARE

## 2020-06-03 DIAGNOSIS — C68.9 UROTHELIAL CANCER (H): Primary | ICD-10-CM

## 2020-06-03 DIAGNOSIS — C66.1 UROTHELIAL CARCINOMA OF RIGHT DISTAL URETER (H): ICD-10-CM

## 2020-06-03 DIAGNOSIS — C68.9 UROTHELIAL CARCINOMA (H): ICD-10-CM

## 2020-06-03 LAB
ALBUMIN UR-MCNC: NEGATIVE MG/DL
ALBUMIN UR-MCNC: NORMAL MG/DL
APPEARANCE UR: CLEAR
APPEARANCE UR: NORMAL
BILIRUB UR QL STRIP: NEGATIVE
BILIRUB UR QL STRIP: NORMAL
COLOR UR AUTO: NORMAL
COLOR UR AUTO: NORMAL
GLUCOSE UR STRIP-MCNC: NEGATIVE MG/DL
GLUCOSE UR STRIP-MCNC: NORMAL MG/DL
HGB UR QL STRIP: NEGATIVE
HGB UR QL STRIP: NORMAL
KETONES UR STRIP-MCNC: NEGATIVE MG/DL
KETONES UR STRIP-MCNC: NORMAL MG/DL
LEUKOCYTE ESTERASE UR QL STRIP: NEGATIVE
LEUKOCYTE ESTERASE UR QL STRIP: NORMAL
NITRATE UR QL: NEGATIVE
NITRATE UR QL: NORMAL
PH UR STRIP: 6 PH (ref 5–7)
PH UR STRIP: NORMAL PH (ref 5–7)
SOURCE: NORMAL
SOURCE: NORMAL
SP GR UR STRIP: 1 (ref 1–1.03)
SP GR UR STRIP: NORMAL (ref 1–1.03)
UROBILINOGEN UR STRIP-MCNC: 0 MG/DL (ref 0–2)
UROBILINOGEN UR STRIP-MCNC: NORMAL MG/DL (ref 0–2)

## 2020-06-03 PROCEDURE — 81003 URINALYSIS AUTO W/O SCOPE: CPT | Performed by: UROLOGY

## 2020-06-03 PROCEDURE — 25000128 H RX IP 250 OP 636: Mod: ZF | Performed by: UROLOGY

## 2020-06-03 PROCEDURE — 88120 CYTP URNE 3-5 PROBES EA SPEC: CPT | Performed by: UROLOGY

## 2020-06-03 PROCEDURE — 88112 CYTOPATH CELL ENHANCE TECH: CPT | Performed by: UROLOGY

## 2020-06-03 PROCEDURE — 51720 TREATMENT OF BLADDER LESION: CPT

## 2020-06-03 RX ADMIN — BACILLUS CALMETTE-GUERIN 50 MG: 50 POWDER, FOR SUSPENSION INTRAVESICAL at 13:31

## 2020-06-03 ASSESSMENT — PAIN SCALES - GENERAL: PAINLEVEL: MILD PAIN (3)

## 2020-06-03 NOTE — PROGRESS NOTES
Infusion Nursing Note:  Dimple Oviedo presents today for BCG Maintenance.  Instillation number 3 of 3.   Patient seen by provider today: No   present during visit today: Not Applicable.    Note: Patient arrives to infusion feeling well. Following treatment last week, she felt fatigued and had a headache but both symptoms resolved within 24 hours. Denies having any urinary issues post-BCG besides her baseline incontinence. Patient was able to hold the BCG for the full 2 hours. Denies dysuria, increased urgency, increased frequency, hematuria, fever, or chills today. Patient took Levaquin as directed.     No visible blood in patient's urine sample today. Pt reports having an adequate supply of Levaquin for today and next treatment.       Treatment Conditions:   Patient states no concerns or issues at this time.  Ok to proceed with treatment.     Labs Reviewed:  Urine analysis.  UA RESULTS:  Lab Test 06/03/20  1245   COLOR Straw   APPEARANCE Clear   URINEGLC Negative   URINEBILI Negative   URINEKETONE Negative   SG 1.004   UBLD Negative   URINEPH 6.0   PROTEIN Negative   UROBILINOGEN  --    NITRITE Negative   LEUKEST Negative   RBCU  --    WBCU  --        Results meet treatment conditions.     Procedure:  16F Diehl catheter placed.  Catheter placed without trauma.  Clear/yellow urine drained from bladder.    Patient turned every 15 minutes per protocol: Yes, turning completed at clinic per protocol.  Patient tolerated instillation without incident. Patient held BCG for full 2 hours without issue.    Patient instructed on the following and verbalized understanding:   Medication to remain in the bladder for 2 hours post instillation: YES  Post instillation side effects and precautions discussed: YES  Levaquin dose to be taken 6 hours post instillation and tomorrow morning: YES    Discharge Plan:   Patient declined prescription refills.  Discharge instructions reviewed with: Patient.  Patient and/or family  verbalized understanding of discharge instructions and all questions answered.  Copy of AVS reviewed with patient and/or family.  Patient will return 7/30 for next appointment.  Patient discharged in stable condition accompanied by: self/.  Departure Mode: Wheelchair.    Shannen Pulliam RN

## 2020-06-03 NOTE — PATIENT INSTRUCTIONS
Home discharge instructions:  -Wear pad if incontinence is a possibliity  -Wash hands throughly after using the bathroom  -For safety reasons remember to follow these directions for 6 hours after each treatment:                        1. Sit on the toilet seat to urinate                        2. Immediately after urinating- add 2 cups of undiluted chlorine bleach to the toilet                         3. Close lid and let the bleach sit for 15 minutes each time                        4. Drink plenty of water to flush any remaining medication out of your bladder     **These steps will help kill all the BCG bacteria and disinfect the toilet**    Take your antibiotic today at 7:30pm and tomorrow morning.        Please call urology triage line at [phone] or [phone] with fevers or chills, burning with urination, or blood in your urine which lasts more than a 48-72 hours or with any question or concerns.

## 2020-06-04 VITALS
SYSTOLIC BLOOD PRESSURE: 151 MMHG | OXYGEN SATURATION: 97 % | HEART RATE: 53 BPM | DIASTOLIC BLOOD PRESSURE: 75 MMHG | RESPIRATION RATE: 16 BRPM | TEMPERATURE: 98.4 F

## 2020-06-12 LAB — COPATH REPORT: NORMAL

## 2020-07-15 ENCOUNTER — MYC MEDICAL ADVICE (OUTPATIENT)
Dept: CARDIOLOGY | Facility: CLINIC | Age: 85
End: 2020-07-15

## 2020-07-16 ENCOUNTER — PRE VISIT (OUTPATIENT)
Dept: UROLOGY | Facility: CLINIC | Age: 85
End: 2020-07-16

## 2020-07-16 NOTE — TELEPHONE ENCOUNTER
Called Dimple back. She reports she has an appointment in endocrine with Dr Meza in a couple weeks and is wondering if she needs labs. I gave her the phone number for the endocrine clinic to contact them directly for any labs needed.   Charu Conner RN

## 2020-07-20 ENCOUNTER — APPOINTMENT (OUTPATIENT)
Dept: GENERAL RADIOLOGY | Facility: CLINIC | Age: 85
End: 2020-07-20
Attending: FAMILY MEDICINE
Payer: MEDICARE

## 2020-07-20 ENCOUNTER — HOSPITAL ENCOUNTER (EMERGENCY)
Facility: CLINIC | Age: 85
Discharge: HOME | End: 2020-07-20
Attending: FAMILY MEDICINE | Admitting: FAMILY MEDICINE
Payer: MEDICARE

## 2020-07-20 ENCOUNTER — APPOINTMENT (OUTPATIENT)
Dept: CT IMAGING | Facility: CLINIC | Age: 85
End: 2020-07-20
Attending: FAMILY MEDICINE
Payer: MEDICARE

## 2020-07-20 VITALS
DIASTOLIC BLOOD PRESSURE: 78 MMHG | HEART RATE: 59 BPM | TEMPERATURE: 97.9 F | RESPIRATION RATE: 18 BRPM | OXYGEN SATURATION: 96 % | HEIGHT: 57 IN | SYSTOLIC BLOOD PRESSURE: 167 MMHG | BODY MASS INDEX: 36.19 KG/M2

## 2020-07-20 DIAGNOSIS — R06.02 SHORTNESS OF BREATH: ICD-10-CM

## 2020-07-20 DIAGNOSIS — Z20.828 CONTACT WITH AND (SUSPECTED) EXPOSURE TO OTHER VIRAL COMMUNICABLE DISEASES: ICD-10-CM

## 2020-07-20 DIAGNOSIS — E05.00 GRAVES DISEASE: Primary | ICD-10-CM

## 2020-07-20 LAB
ALBUMIN SERPL-MCNC: 3.7 G/DL (ref 3.4–5)
ALP SERPL-CCNC: 90 U/L (ref 40–150)
ALT SERPL W P-5'-P-CCNC: 21 U/L (ref 0–50)
ANION GAP SERPL CALCULATED.3IONS-SCNC: 3 MMOL/L (ref 3–14)
APTT PPP: 27 SEC (ref 22–37)
AST SERPL W P-5'-P-CCNC: 30 U/L (ref 0–45)
BASOPHILS # BLD AUTO: 0.1 10E9/L (ref 0–0.2)
BASOPHILS NFR BLD AUTO: 0.7 %
BILIRUB SERPL-MCNC: 0.5 MG/DL (ref 0.2–1.3)
BUN SERPL-MCNC: 25 MG/DL (ref 7–30)
CALCIUM SERPL-MCNC: 8.8 MG/DL (ref 8.5–10.1)
CHLORIDE SERPL-SCNC: 101 MMOL/L (ref 94–109)
CO2 SERPL-SCNC: 30 MMOL/L (ref 20–32)
CREAT SERPL-MCNC: 0.98 MG/DL (ref 0.52–1.04)
DIFFERENTIAL METHOD BLD: NORMAL
EOSINOPHIL # BLD AUTO: 0.1 10E9/L (ref 0–0.7)
EOSINOPHIL NFR BLD AUTO: 1.8 %
ERYTHROCYTE [DISTWIDTH] IN BLOOD BY AUTOMATED COUNT: 12.6 % (ref 10–15)
GFR SERPL CREATININE-BSD FRML MDRD: 52 ML/MIN/{1.73_M2}
GLUCOSE SERPL-MCNC: 90 MG/DL (ref 70–99)
HCT VFR BLD AUTO: 39.8 % (ref 35–47)
HGB BLD-MCNC: 12.9 G/DL (ref 11.7–15.7)
IMM GRANULOCYTES # BLD: 0 10E9/L (ref 0–0.4)
IMM GRANULOCYTES NFR BLD: 0.3 %
INR PPP: 0.95 (ref 0.86–1.14)
LABORATORY COMMENT REPORT: NORMAL
LYMPHOCYTES # BLD AUTO: 1.7 10E9/L (ref 0.8–5.3)
LYMPHOCYTES NFR BLD AUTO: 23.9 %
MCH RBC QN AUTO: 31.9 PG (ref 26.5–33)
MCHC RBC AUTO-ENTMCNC: 32.4 G/DL (ref 31.5–36.5)
MCV RBC AUTO: 99 FL (ref 78–100)
MONOCYTES # BLD AUTO: 0.6 10E9/L (ref 0–1.3)
MONOCYTES NFR BLD AUTO: 9 %
NEUTROPHILS # BLD AUTO: 4.6 10E9/L (ref 1.6–8.3)
NEUTROPHILS NFR BLD AUTO: 64.3 %
NRBC # BLD AUTO: 0 10*3/UL
NRBC BLD AUTO-RTO: 0 /100
NT-PROBNP SERPL-MCNC: 929 PG/ML (ref 0–1800)
PLATELET # BLD AUTO: 224 10E9/L (ref 150–450)
POTASSIUM SERPL-SCNC: 4.8 MMOL/L (ref 3.4–5.3)
PROT SERPL-MCNC: 7.6 G/DL (ref 6.8–8.8)
RBC # BLD AUTO: 4.04 10E12/L (ref 3.8–5.2)
SARS-COV-2 RNA SPEC QL NAA+PROBE: NEGATIVE
SARS-COV-2 RNA SPEC QL NAA+PROBE: NORMAL
SODIUM SERPL-SCNC: 134 MMOL/L (ref 133–144)
SPECIMEN SOURCE: NORMAL
SPECIMEN SOURCE: NORMAL
TROPONIN I SERPL-MCNC: <0.015 UG/L (ref 0–0.04)
TSH SERPL DL<=0.005 MIU/L-ACNC: 3.08 MU/L (ref 0.4–4)
WBC # BLD AUTO: 7.1 10E9/L (ref 4–11)

## 2020-07-20 PROCEDURE — U0003 INFECTIOUS AGENT DETECTION BY NUCLEIC ACID (DNA OR RNA); SEVERE ACUTE RESPIRATORY SYNDROME CORONAVIRUS 2 (SARS-COV-2) (CORONAVIRUS DISEASE [COVID-19]), AMPLIFIED PROBE TECHNIQUE, MAKING USE OF HIGH THROUGHPUT TECHNOLOGIES AS DESCRIBED BY CMS-2020-01-R: HCPCS | Performed by: FAMILY MEDICINE

## 2020-07-20 PROCEDURE — C9803 HOPD COVID-19 SPEC COLLECT: HCPCS | Performed by: FAMILY MEDICINE

## 2020-07-20 PROCEDURE — 85610 PROTHROMBIN TIME: CPT | Performed by: FAMILY MEDICINE

## 2020-07-20 PROCEDURE — 85730 THROMBOPLASTIN TIME PARTIAL: CPT | Performed by: FAMILY MEDICINE

## 2020-07-20 PROCEDURE — 71045 X-RAY EXAM CHEST 1 VIEW: CPT

## 2020-07-20 PROCEDURE — 93010 ELECTROCARDIOGRAM REPORT: CPT | Mod: Z6 | Performed by: FAMILY MEDICINE

## 2020-07-20 PROCEDURE — 84443 ASSAY THYROID STIM HORMONE: CPT | Performed by: FAMILY MEDICINE

## 2020-07-20 PROCEDURE — A9270 NON-COVERED ITEM OR SERVICE: HCPCS | Mod: GY | Performed by: FAMILY MEDICINE

## 2020-07-20 PROCEDURE — 25000128 H RX IP 250 OP 636: Performed by: STUDENT IN AN ORGANIZED HEALTH CARE EDUCATION/TRAINING PROGRAM

## 2020-07-20 PROCEDURE — 80053 COMPREHEN METABOLIC PANEL: CPT | Performed by: FAMILY MEDICINE

## 2020-07-20 PROCEDURE — 84484 ASSAY OF TROPONIN QUANT: CPT | Performed by: FAMILY MEDICINE

## 2020-07-20 PROCEDURE — 83880 ASSAY OF NATRIURETIC PEPTIDE: CPT | Performed by: FAMILY MEDICINE

## 2020-07-20 PROCEDURE — 96360 HYDRATION IV INFUSION INIT: CPT | Mod: 59 | Performed by: FAMILY MEDICINE

## 2020-07-20 PROCEDURE — 71275 CT ANGIOGRAPHY CHEST: CPT

## 2020-07-20 PROCEDURE — 25800030 ZZH RX IP 258 OP 636: Performed by: FAMILY MEDICINE

## 2020-07-20 PROCEDURE — 93005 ELECTROCARDIOGRAM TRACING: CPT | Performed by: FAMILY MEDICINE

## 2020-07-20 PROCEDURE — 25000132 ZZH RX MED GY IP 250 OP 250 PS 637: Mod: GY | Performed by: FAMILY MEDICINE

## 2020-07-20 PROCEDURE — 85025 COMPLETE CBC W/AUTO DIFF WBC: CPT | Performed by: FAMILY MEDICINE

## 2020-07-20 PROCEDURE — 94640 AIRWAY INHALATION TREATMENT: CPT | Performed by: FAMILY MEDICINE

## 2020-07-20 PROCEDURE — 99285 EMERGENCY DEPT VISIT HI MDM: CPT | Mod: 25 | Performed by: FAMILY MEDICINE

## 2020-07-20 RX ORDER — LIDOCAINE 40 MG/G
CREAM TOPICAL
Status: DISCONTINUED | OUTPATIENT
Start: 2020-07-20 | End: 2020-07-21 | Stop reason: HOSPADM

## 2020-07-20 RX ORDER — ALBUTEROL SULFATE 90 UG/1
2 AEROSOL, METERED RESPIRATORY (INHALATION) ONCE
Status: COMPLETED | OUTPATIENT
Start: 2020-07-20 | End: 2020-07-20

## 2020-07-20 RX ORDER — IOPAMIDOL 755 MG/ML
58 INJECTION, SOLUTION INTRAVASCULAR ONCE
Status: COMPLETED | OUTPATIENT
Start: 2020-07-20 | End: 2020-07-20

## 2020-07-20 RX ORDER — ACETAMINOPHEN 325 MG/1
650 TABLET ORAL ONCE
Status: COMPLETED | OUTPATIENT
Start: 2020-07-20 | End: 2020-07-20

## 2020-07-20 RX ORDER — ROPINIROLE 0.5 MG/1
0.5 TABLET, FILM COATED ORAL ONCE
Status: COMPLETED | OUTPATIENT
Start: 2020-07-20 | End: 2020-07-20

## 2020-07-20 RX ADMIN — SODIUM CHLORIDE 500 ML: 9 INJECTION, SOLUTION INTRAVENOUS at 21:30

## 2020-07-20 RX ADMIN — IOPAMIDOL 58 ML: 755 INJECTION, SOLUTION INTRAVENOUS at 20:44

## 2020-07-20 RX ADMIN — ROPINIROLE HYDROCHLORIDE 0.5 MG: 0.5 TABLET, FILM COATED ORAL at 21:55

## 2020-07-20 RX ADMIN — ALBUTEROL SULFATE 2 PUFF: 90 AEROSOL, METERED RESPIRATORY (INHALATION) at 22:48

## 2020-07-20 RX ADMIN — ACETAMINOPHEN 650 MG: 325 TABLET, FILM COATED ORAL at 21:55

## 2020-07-20 ASSESSMENT — ENCOUNTER SYMPTOMS
SHORTNESS OF BREATH: 1
COUGH: 0
FEVER: 0

## 2020-07-20 NOTE — ED AVS SNAPSHOT
[facility], [place], Emergency Department  [address] [place] [id number]  Phone:  [phone]                                    Dimple Oviedo   MRN: [id number]    Department:  [facility], Emergency Department   Date of Visit:  7/20/2020           After Visit Summary Signature Page    I have received my discharge instructions, and my questions have been answered. I have discussed any challenges I see with this plan with the nurse or doctor.    ..........................................................................................................................................  Patient/Patient Representative Signature      ..........................................................................................................................................  Patient Representative Print Name and Relationship to Patient    ..................................................               ................................................  Date                                   Time    ..........................................................................................................................................  Reviewed by Signature/Title    ...................................................              ..............................................  Date                                               Time          22EPIC Rev 08/18

## 2020-07-20 NOTE — ED NOTES
Bed: ED33  Expected date: 7/20/20  Expected time: 6:18 PM  Means of arrival: Ambulance  Comments:  Lucas Morales    88 y/o Female    SOB    Triaged: YELLOW

## 2020-07-20 NOTE — ED PROVIDER NOTES
[facility] ([facility])  July 20, 2020  History     Chief Complaint   Patient presents with     Shortness of Breath     The history is provided by the patient and medical records.     Dimple Oviedo is a 87 year old female with a history of urothelial cancer, NSTEMI, atrial fibrillation, chronic systolic heart failure, HTN, and HLD who presents to the Emergency Department with shortness of breath. Patient reports shortness of breath with activity over the past week. She states her breathing feels heavy. Patient reports she has also been having increased right knee pain lately. She states she was supposed to have a replacement in February, but couldn't due to chemotherapy. Patient has chronic bilateral leg swelling. Patient denies chest pain, cough, or fever. No history of DVT/PE. Patient is not a smoker. Patient feels fine currently.    PAST MEDICAL HISTORY:   Past Medical History:   Diagnosis Date     Calculus of kidney      Chemotherapy-induced neutropenia (H) 3/6/2019     Esophageal reflux      GERD (gastroesophageal reflux disease)      Hyperlipidemia LDL goal <130 5/9/2010     Malignant melanoma of skin of trunk, except scrotum (H)      Nonspecific abnormal finding     has living will 2004 -      Nontoxic multinodular goiter     no further eval /tx rec per pt     Osteopenia      Other psoriasis      Personal history of colonic polyps      PMR (polymyalgia rheumatica) (H)      Stress-induced cardiomyopathy      Undiagnosed cardiac murmurs      Unspecified constipation      Unspecified essential hypertension      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       PAST SURGICAL HISTORY:   Past Surgical History:   Procedure Laterality Date     BIOPSY       C NONSPECIFIC PROCEDURE  2005    colonoscopy polyp repeat 2010     COLONOSCOPY  2014     COMBINED CYSTOSCOPY, INSERT STENT URETER(S) Bilateral 9/12/2018    Procedure: COMBINED CYSTOSCOPY, INSERT STENT URETER(S);  Cystoscopy Bilateral  ureteral Stent Placement.;  Surgeon: Justin Henry MD;  [facility]     COMBINED CYSTOSCOPY, RETROGRADES, URETEROSCOPY, INSERT STENT Bilateral 4/3/2018    Procedure: COMBINED CYSTOSCOPY, RETROGRADES, URETEROSCOPY, INSERT STENT;;  Surgeon: Stuart King MD;  [facility]     COMBINED CYSTOSCOPY, RETROGRADES, URETEROSCOPY, INSERT STENT Bilateral 9/10/2018    Procedure: COMBINED CYSTOSCOPY, RETROGRADES, URETEROSCOPY, INSERT STENT;  Cystoscopy, Bilateral Ureteroscopy, Bladder Biopsies, Retrogram Pyelograms, Ureteral Washings and brushings, cysview;  Surgeon: Stuart King MD;  [facility]     CYSTOSCOPY, BIOPSY BLADDER INSTILL OPTICAL AGENT N/A 4/3/2018    Procedure: CYSTOSCOPY, BIOPSY BLADDER INSTILL OPTICAL AGENT;  Cystoscopy, Blue Light Cystoscopy, Bladder Biopsies, Bilateral Selective ureteral washings for Cytology, Bilateral Retrograde Pyelograms, Bilateral Ureteroscopy;  Surgeon: Stuart King MD;  [facility]     CYSTOSCOPY, BIOPSY BLADDER, COMBINED N/A 2/19/2018    Procedure: COMBINED CYSTOSCOPY, BIOPSY BLADDER;  Cystoscopy, Bladder Biopsy;  Surgeon: Kenna La MD;  [facility]: [facility]     CYSTOSCOPY, FULGURATE BLADDER TUMOR, COMBINED N/A 1/13/2020    Procedure: CYSTOSCOPY, WITH  bladder biopsies and fulguration;  Surgeon: Stuart King MD;  [facility]     CYSTOSCOPY, REMOVE STENT(S), COMBINED  11/13/2018    Procedure: Flexible Cystoscopy with Stent Removal;  Surgeon: Stuart King MD;  [facility]     DAVINCI LYMPHADENECTOMY N/A 11/13/2018    Procedure: Davinci Lymphadenectomy ;  Surgeon: Stuart King MD;  [facility]     DAVINCI NEPHROURETERECTOMY N/A 11/13/2018    Procedure: Right DaVinci Assisted Nephroureterectomy;  Surgeon: Stuart King MD;  [facility]     ENDOSCOPIC ULTRASOUND LOWER GASTROINTESTIONAL TRACT (GI) N/A 10/30/2015    Procedure: ENDOSCOPIC ULTRASOUND LOWER  GASTROINTESTIONAL TRACT (GI);  Surgeon: Daniel Jean-Baptiste MD;  [facility]     EYE SURGERY  12/4/17     INSERT PORT VASCULAR ACCESS Right 12/19/2018    Procedure: Chest Port Placement - right;  Surgeon: Stuart Chavez PA-C;  [facility]     IR CHEST PORT PLACEMENT > 5 YRS OF AGE  12/19/2018     IR PORT REMOVAL RIGHT  6/26/2019     LAPAROSCOPIC CHOLECYSTECTOMY WITH CHOLANGIOGRAMS N/A 11/1/2015    Procedure: LAPAROSCOPIC CHOLECYSTECTOMY WITH CHOLANGIOGRAMS;  Surgeon: Tonie Warren MD;  [facility]     REMOVE PORT VASCULAR ACCESS Right 6/26/2019    Procedure: Right Port Removal;  Surgeon: Froilan Irizarry PA-C;  [facility]     SURGICAL HISTORY OF -   1996    malignant melanoma     SURGICAL HISTORY OF -   1968    thyroid nodule     SURGICAL HISTORY OF -       D & C       Past medical history, past surgical history, medications, and allergies were reviewed with the patient. Additional pertinent items: None    FAMILY HISTORY:   Family History   Problem Relation Age of Onset     Cancer Father         dec - esophageal and laryngeal     Heart Disease Mother      Respiratory Mother         dec     Breast Cancer Daughter      Other Cancer Daughter      Thyroid Disease Daughter      Asthma Daughter      Hyperlipidemia Son      Diabetes Son        SOCIAL HISTORY:   Social History     Tobacco Use     Smoking status: Never Smoker     Smokeless tobacco: Never Used   Substance Use Topics     Alcohol use: No     Alcohol/week: 0.0 standard drinks     Social history was reviewed with the patient. Additional pertinent items: None      Discharge Medication List as of 7/20/2020 10:54 PM      CONTINUE these medications which have NOT CHANGED    Details   acetaminophen (TYLENOL) 650 MG CR tablet Take 1 tablet (650 mg) by mouth every 8 hours as needed for pain, Disp-100 tablet, R-0, E-Prescribe      alendronate (FOSAMAX) 70 MG tablet TAKE 1 TABLET EVERY 7 DAYS AT LEAST 60 MINUTES BEFORE BREAKFAST  AS DIRECTED., Disp-12 tablet,R-3, E-Prescribe      aspirin 81 MG tablet Take 81 mg by mouth every evening , Historical      Calcium Citrate-Vitamin D (CALCIUM + D PO) Take 1 tablet by mouth daily , Historical      cycloSPORINE (RESTASIS) 0.05 % ophthalmic emulsion Place 1 drop into both eyes 2 times daily, Historical      ferrous sulfate 325 (65 Fe) MG TBEC EC tablet Take 325 mg by mouth every morning , Historical      furosemide (LASIX) 20 MG tablet Take 1 tablet (20 mg) by mouth every morning, Disp-90 tablet, R-3, E-Prescribe      irbesartan (AVAPRO) 300 MG tablet TAKE 1 TABLET EVERY DAY, Disp-90 tablet, R-3, E-Prescribe      levofloxacin (LEVAQUIN) 500 MG tablet Take 1 tablet 6 hours post treatment and 1 tablet each AM following each treatment., Disp-6 tablet,R-6, E-Prescribe      lovastatin (MEVACOR) 40 MG tablet TAKE 1 TABLET AT BEDTIME (HYPERLIPIDEMIA LDL GOAL BELOW 130), Disp-90 tablet, R-3, E-Prescribe      methimazole (TAPAZOLE) 5 MG tablet Take 1 tablet (5 mg) by mouth daily, Disp-90 tablet,R-3, E-Prescribe      nitroGLYcerin (NITROSTAT) 0.4 MG sublingual tablet For chest pain place 1 tablet under the tongue every 5 minutes for 3 doses. If symptoms persist 5 minutes after 1st dose call 911., Disp-25 tablet, R-3, E-Prescribe      Omega-3 Fatty Acids (FISH OIL) 500 MG CAPS Take 1 capsule by mouth daily , Historical      omeprazole (PRILOSEC) 20 MG DR capsule TAKE 1 CAPSULE EVERY DAY, Disp-90 capsule, R-3, E-Prescribe      Probiotic Product (PROBIOTIC ADVANCED PO) Take 1 capsule by mouth every morning , Historical      propranolol ER (INDERAL LA) 60 MG 24 hr capsule Take 1 capsule (60 mg) by mouth every morning, Disp-90 capsule,R-1, E-Prescribe      rOPINIRole (REQUIP) 0.25 MG tablet TAKE 1 TABLET IN THE AFTERNOON AND TAKE 2 TABLETS EVERY NIGHT, Disp-270 tablet,R-1, E-Prescribe      sertraline (ZOLOFT) 100 MG tablet TAKE 1 TABLET (100 MG) BY MOUTH EVERY MORNING, Disp-90 tablet,R-2, E-Prescribe      traZODone  "(DESYREL) 50 MG tablet TAKE 1/2 TABLET AT BEDTIME, Disp-45 tablet,R-2, E-Prescribe         STOP taking these medications       triamcinolone (KENALOG-40) 40 MG/ML injection Comments:   Reason for Stopping:         triamcinolone (KENALOG-40) 40 MG/ML injection Comments:   Reason for Stopping:                  Allergies   Allergen Reactions     Codeine         Review of Systems   Constitutional: Negative for fever.   HENT: Negative for sneezing, sore throat and trouble swallowing.    Eyes: Negative for visual disturbance.   Respiratory: Positive for shortness of breath. Negative for cough, choking and chest tightness.    Cardiovascular: Positive for leg swelling (chronic). Negative for chest pain.   Gastrointestinal: Negative for abdominal pain, nausea and vomiting.   Genitourinary: Negative for decreased urine volume and urgency.   Musculoskeletal: Positive for arthralgias (right knee) and gait problem (uses walker).        Positive for right knee pain   Skin: Negative for rash and wound.   Allergic/Immunologic: Negative for immunocompromised state.   Neurological: Positive for weakness (mild general). Negative for syncope, light-headedness and headaches.   Hematological: Does not bruise/bleed easily.   Psychiatric/Behavioral: Negative for confusion, decreased concentration and dysphoric mood.   All other systems reviewed and are negative.    A complete review of systems was performed with pertinent positives and negatives noted in the HPI, and all other systems negative.    Physical Exam   BP: (!) 168/70  Pulse: 75  Temp: 97.9  F (36.6  C)  Resp: 18  Height: 144.8 cm (4' 9\")  SpO2: 96 %      Physical Exam  Vitals signs and nursing note reviewed.   Constitutional:       General: She is not in acute distress.     Appearance: She is well-developed. She is not ill-appearing or diaphoretic.      Comments: Patient currently feeling fine at rest. No current sob   HENT:      Head: Normocephalic and atraumatic.      " Mouth/Throat:      Mouth: Mucous membranes are moist.   Eyes:      General: No scleral icterus.     Extraocular Movements: Extraocular movements intact.      Conjunctiva/sclera: Conjunctivae normal.      Pupils: Pupils are equal, round, and reactive to light.   Neck:      Musculoskeletal: Normal range of motion and neck supple. No neck rigidity.   Cardiovascular:      Rate and Rhythm: Normal rate and regular rhythm.   Pulmonary:      Effort: No respiratory distress.      Breath sounds: No stridor. No wheezing or rales.   Abdominal:      Palpations: There is no mass.      Tenderness: There is no abdominal tenderness. There is no rebound.   Musculoskeletal:      Right lower leg: Edema present.      Left lower leg: Edema present.      Comments: Chronic bilateral LE edema without erythema   Skin:     General: Skin is warm and dry.      Capillary Refill: Capillary refill takes less than 2 seconds.      Coloration: Skin is not pale.      Findings: No erythema or rash.   Neurological:      General: No focal deficit present.      Mental Status: She is alert and oriented to person, place, and time. Mental status is at baseline.   Psychiatric:         Mood and Affect: Mood normal.         Behavior: Behavior normal.         Thought Content: Thought content normal.         Judgment: Judgment normal.         ED Course   6:28 PM  The patient was seen and examined by Lorne Macdonald MD in Room ED33.      Procedures         Patient eluate here in the ER.  COVID swabbing was sent.  It was later resulted as being negative.  Patient informed of these results.  Vital signs are stable otherwise.  EKG was done revealing normal sinus rhythm occasional P SVC but no hyperacute ischemic changes identified.  Labs are drawn reviewed CBC chemistries otherwise not show any significant findings.  Troponin BNP otherwise are negative.  Chest x-ray done revealing no sign of effusion infiltrate heart failure etc.  Some cardiomegaly noted.  Here in the  ER reviewed records I talked oncology also as patient has scheduled in a week a CT of her chest abdomen pelvis for her surveillance of previous cancers.  At this point we just elected to do CT PE protocol as her not sure as far as the timing etc.    Patient agreed the CT scan was done there is no PE seen.  No significant findings are identified causing her symptoms at this point.  No acute infiltrates effusion pneumothorax etc.    Discussed with patient regarding his findings at this point patient better several hours feels fine at this point would like to go home we did offer overnight observation at this point patient would rather go home she contacted her daughter-in-law who come and get her.  Patient was given albuterol inhaler 2 puffs here in the ER and sent home to use as also to see if this helps as it may be bronchospastic component.  Patient does have scheduled follow-up will have her return if any concerns but otherwise appears well in no distress and comfortable being discharged.         EKG Interpretation:      Interpreted by Lorne Macdonald MD  Time reviewed: 1847  Symptoms at time of EKG: exertional sob   Rhythm: normal sinus   Rate: normal  Axis: normal  Ectopy: occ psvc  Conduction: normal  ST Segments/ T Waves: No hyperacute changes ST-T wave changes  Q Waves: none  Comparison to prior: No old EKG available      Clinical Impression: nsr without ischemic changes                      Results for orders placed or performed during the hospital encounter of 07/20/20   XR Chest Port 1 View     Status: None    Narrative    XR CHEST PORT 1 VW, 7/20/2020 6:46 PM.    Comparison: 4/8/2020.    History: sob.    Technique: AP radiograph the chest    Findings:   Cardiomediastinal silhouette is within normal limits. Pulmonary  vasculature is distinct. No acute airspace opacities. No pleural  effusion. No pneumothorax. No acute intra-abdominal abnormalities.      Impression    Impression:   No acute  cardiopulmonary disease.     I have personally reviewed the examination and initial interpretation  and I agree with the findings.    ALEJANDRA CROUCH MD   CT Chest Pulmonary Embolism w Contrast     Status: None    Narrative    EXAMINATION: CTA pulmonary angiogram, 7/20/2020 9:06 PM     COMPARISON: CT 4/8/2020    HISTORY: Dyspnea on exertion and underlying cancer evaluation for PE  versus other cause.    TECHNIQUE: Volumetric helical acquisition of CT images of the chest  from the lung apices to the kidneys were acquired after the  administration of 58 mm mL of Isovue-370 IV contrast. Flash technique  with free breathing acquisition.  Post-processed multiplanar and/or  MIP reformations were obtained, archived to PACS and used in  interpretation of this study.     FINDINGS:  Contrast bolus is: adequate.  Exam is negative for acute  pulmonary embolism.       Multinodular thyroid, similar in appearance to prior exam. There is no  thoracic adenopathy. Heart size is enlarged. There is no pericardial  effusion. Normal configuration of the great vessels. Mild to moderate  atheromatous plaquing throughout the aortic arch. Aortic valve  calcifications. Thoracic esophagus is within normal limits. There is a  moderate-sized hiatal hernia containing half the stomach.    No pleural effusion. No pneumothorax. Mild hilar bronchial wall  thickening. No suspicious pulmonary nodules.    Limited evaluation of the upper abdomen. Cholecystectomy clips. Hiatal  hernia as discussed above. Spleen, visualized portions of the kidneys,  and adrenal glands are within normal limits.     No suspicious osseous lesions. Exaggerated kyphotic curve of the  thoracic spine. Soft tissue is unremarkable.      Impression    IMPRESSION:   1. Exam is negative  for acute pulmonary embolism.   2. Cardiomegaly.  3. Stable multinodular thyroid.  4. Moderate sized hiatal hernia.    In the event of a positive result for acute pulmonary embolism  resulting  in right heart strain, please activate the [facility]  Multidisciplinary group for consultation by paging [phone]  [phone]).     PERT -- Pulmonary Embolism Response Team (Multidisciplinary team  including cardiology, interventional radiology, critical care,  hematology)    I have personally reviewed the examination and initial interpretation  and I agree with the findings.    ALEJANDRA CROUCH MD   CBC with platelets differential     Status: None   Result Value Ref Range    WBC 7.1 4.0 - 11.0 10e9/L    RBC Count 4.04 3.8 - 5.2 10e12/L    Hemoglobin 12.9 11.7 - 15.7 g/dL    Hematocrit 39.8 35.0 - 47.0 %    MCV 99 78 - 100 fl    MCH 31.9 26.5 - 33.0 pg    MCHC 32.4 31.5 - 36.5 g/dL    RDW 12.6 10.0 - 15.0 %    Platelet Count 224 150 - 450 10e9/L    Diff Method Automated Method     % Neutrophils 64.3 %    % Lymphocytes 23.9 %    % Monocytes 9.0 %    % Eosinophils 1.8 %    % Basophils 0.7 %    % Immature Granulocytes 0.3 %    Nucleated RBCs 0 0 /100    Absolute Neutrophil 4.6 1.6 - 8.3 10e9/L    Absolute Lymphocytes 1.7 0.8 - 5.3 10e9/L    Absolute Monocytes 0.6 0.0 - 1.3 10e9/L    Absolute Eosinophils 0.1 0.0 - 0.7 10e9/L    Absolute Basophils 0.1 0.0 - 0.2 10e9/L    Abs Immature Granulocytes 0.0 0 - 0.4 10e9/L    Absolute Nucleated RBC 0.0    INR     Status: None   Result Value Ref Range    INR 0.95 0.86 - 1.14   Partial thromboplastin time     Status: None   Result Value Ref Range    PTT 27 22 - 37 sec   Comprehensive metabolic panel     Status: Abnormal   Result Value Ref Range    Sodium 134 133 - 144 mmol/L    Potassium 4.8 3.4 - 5.3 mmol/L    Chloride 101 94 - 109 mmol/L    Carbon Dioxide 30 20 - 32 mmol/L    Anion Gap 3 3 - 14 mmol/L    Glucose 90 70 - 99 mg/dL    Urea Nitrogen 25 7 - 30 mg/dL    Creatinine 0.98 0.52 - 1.04 mg/dL    GFR Estimate 52 (L) >60 mL/min/[1.73_m2]    GFR Estimate If Black 60 (L) >60 mL/min/[1.73_m2]    Calcium 8.8 8.5 - 10.1 mg/dL    Bilirubin Total 0.5 0.2 - 1.3 mg/dL    Albumin 3.7  3.4 - 5.0 g/dL    Protein Total 7.6 6.8 - 8.8 g/dL    Alkaline Phosphatase 90 40 - 150 U/L    ALT 21 0 - 50 U/L    AST 30 0 - 45 U/L   TSH     Status: None   Result Value Ref Range    TSH 3.08 0.40 - 4.00 mU/L   Troponin I     Status: None   Result Value Ref Range    Troponin I ES <0.015 0.000 - 0.045 ug/L   Nt probnp inpatient (BNP)     Status: None   Result Value Ref Range    N-Terminal Pro BNP Inpatient 929 0 - 1,800 pg/mL   Symptomatic COVID-19 Virus (Coronavirus) by PCR     Status: None    Specimen: Nasopharyngeal   Result Value Ref Range    COVID-19 Virus PCR to [facility] - Source Nasopharyngeal     COVID-19 Virus PCR to [facility] - Result       Test received-See reflex to IDDL test SARS CoV2 (COVID-19) Virus RT-PCR   SARS-CoV-2 COVID-19 Virus (Coronavirus) RT-PCR Nasopharyngeal     Status: None    Specimen: Nasopharyngeal   Result Value Ref Range    SARS-CoV-2 Virus Specimen Source Nasopharyngeal     SARS-CoV-2 PCR Result NEGATIVE     SARS-CoV-2 PCR Comment       Testing was performed using the Xpert Xpress SARS-CoV-2 Assay on the [employer]Xpert   Instrument Systems. Additional information about this Emergency Use Authorization (EUA)   assay can be found via the Lab Guide.     EKG 12-lead, tracing only     Status: None   Result Value Ref Range    Interpretation ECG Click View Image link to view waveform and result        Results for orders placed or performed during the hospital encounter of 07/20/20 (from the past 24 hour(s))   CBC with platelets differential   Result Value Ref Range    WBC 7.1 4.0 - 11.0 10e9/L    RBC Count 4.04 3.8 - 5.2 10e12/L    Hemoglobin 12.9 11.7 - 15.7 g/dL    Hematocrit 39.8 35.0 - 47.0 %    MCV 99 78 - 100 fl    MCH 31.9 26.5 - 33.0 pg    MCHC 32.4 31.5 - 36.5 g/dL    RDW 12.6 10.0 - 15.0 %    Platelet Count 224 150 - 450 10e9/L    Diff Method Automated Method     % Neutrophils 64.3 %    % Lymphocytes 23.9 %    % Monocytes 9.0 %    % Eosinophils 1.8 %    % Basophils 0.7 %    %  Immature Granulocytes 0.3 %    Nucleated RBCs 0 0 /100    Absolute Neutrophil 4.6 1.6 - 8.3 10e9/L    Absolute Lymphocytes 1.7 0.8 - 5.3 10e9/L    Absolute Monocytes 0.6 0.0 - 1.3 10e9/L    Absolute Eosinophils 0.1 0.0 - 0.7 10e9/L    Absolute Basophils 0.1 0.0 - 0.2 10e9/L    Abs Immature Granulocytes 0.0 0 - 0.4 10e9/L    Absolute Nucleated RBC 0.0    INR   Result Value Ref Range    INR 0.95 0.86 - 1.14   Partial thromboplastin time   Result Value Ref Range    PTT 27 22 - 37 sec   Comprehensive metabolic panel   Result Value Ref Range    Sodium 134 133 - 144 mmol/L    Potassium 4.8 3.4 - 5.3 mmol/L    Chloride 101 94 - 109 mmol/L    Carbon Dioxide 30 20 - 32 mmol/L    Anion Gap 3 3 - 14 mmol/L    Glucose 90 70 - 99 mg/dL    Urea Nitrogen 25 7 - 30 mg/dL    Creatinine 0.98 0.52 - 1.04 mg/dL    GFR Estimate 52 (L) >60 mL/min/[1.73_m2]    GFR Estimate If Black 60 (L) >60 mL/min/[1.73_m2]    Calcium 8.8 8.5 - 10.1 mg/dL    Bilirubin Total 0.5 0.2 - 1.3 mg/dL    Albumin 3.7 3.4 - 5.0 g/dL    Protein Total 7.6 6.8 - 8.8 g/dL    Alkaline Phosphatase 90 40 - 150 U/L    ALT 21 0 - 50 U/L    AST 30 0 - 45 U/L   TSH   Result Value Ref Range    TSH 3.08 0.40 - 4.00 mU/L   Troponin I   Result Value Ref Range    Troponin I ES <0.015 0.000 - 0.045 ug/L   Nt probnp inpatient (BNP)   Result Value Ref Range    N-Terminal Pro BNP Inpatient 929 0 - 1,800 pg/mL   Symptomatic COVID-19 Virus (Coronavirus) by PCR    Specimen: Nasopharyngeal   Result Value Ref Range    COVID-19 Virus PCR to [facility] - Source Nasopharyngeal     COVID-19 Virus PCR to [facility] - Result       Test received-See reflex to IDDL test SARS CoV2 (COVID-19) Virus RT-PCR   SARS-CoV-2 COVID-19 Virus (Coronavirus) RT-PCR Nasopharyngeal    Specimen: Nasopharyngeal   Result Value Ref Range    SARS-CoV-2 Virus Specimen Source Nasopharyngeal     SARS-CoV-2 PCR Result NEGATIVE     SARS-CoV-2 PCR Comment       Testing was performed using the Xpert Xpress SARS-CoV-2 Assay on  the [employer]. Additional information about this Emergency Use Authorization (EUA)   assay can be found via the Lab Guide.     XR Chest Port 1 View    Narrative    XR CHEST PORT 1 VW, 7/20/2020 6:46 PM.    Comparison: 4/8/2020.    History: sob.    Technique: AP radiograph the chest    Findings:   Cardiomediastinal silhouette is within normal limits. Pulmonary  vasculature is distinct. No acute airspace opacities. No pleural  effusion. No pneumothorax. No acute intra-abdominal abnormalities.      Impression    Impression:   No acute cardiopulmonary disease.     I have personally reviewed the examination and initial interpretation  and I agree with the findings.    ALEJANDRA CROUCH MD   EKG 12-lead, tracing only   Result Value Ref Range    Interpretation ECG Click View Image link to view waveform and result    CT Chest Pulmonary Embolism w Contrast    Narrative    EXAMINATION: CTA pulmonary angiogram, 7/20/2020 9:06 PM     COMPARISON: CT 4/8/2020    HISTORY: Dyspnea on exertion and underlying cancer evaluation for PE  versus other cause.    TECHNIQUE: Volumetric helical acquisition of CT images of the chest  from the lung apices to the kidneys were acquired after the  administration of 58 mm mL of Isovue-370 IV contrast. Flash technique  with free breathing acquisition.  Post-processed multiplanar and/or  MIP reformations were obtained, archived to PACS and used in  interpretation of this study.     FINDINGS:  Contrast bolus is: adequate.  Exam is negative for acute  pulmonary embolism.       Multinodular thyroid, similar in appearance to prior exam. There is no  thoracic adenopathy. Heart size is enlarged. There is no pericardial  effusion. Normal configuration of the great vessels. Mild to moderate  atheromatous plaquing throughout the aortic arch. Aortic valve  calcifications. Thoracic esophagus is within normal limits. There is a  moderate-sized hiatal hernia containing half the  stomach.    No pleural effusion. No pneumothorax. Mild hilar bronchial wall  thickening. No suspicious pulmonary nodules.    Limited evaluation of the upper abdomen. Cholecystectomy clips. Hiatal  hernia as discussed above. Spleen, visualized portions of the kidneys,  and adrenal glands are within normal limits.     No suspicious osseous lesions. Exaggerated kyphotic curve of the  thoracic spine. Soft tissue is unremarkable.      Impression    IMPRESSION:   1. Exam is negative  for acute pulmonary embolism.   2. Cardiomegaly.  3. Stable multinodular thyroid.  4. Moderate sized hiatal hernia.    In the event of a positive result for acute pulmonary embolism  resulting in right heart strain, please activate the [facility]  Multidisciplinary group for consultation by paging [phone]  ([id number]).     PERT -- Pulmonary Embolism Response Team (Multidisciplinary team  including cardiology, interventional radiology, critical care,  hematology)    I have personally reviewed the examination and initial interpretation  and I agree with the findings.    ALEJANDRA CROUCH MD     *Note: Due to a large number of results and/or encounters for the requested time period, some results have not been displayed. A complete set of results can be found in Results Review.     Medications   0.9% sodium chloride BOLUS (0 mLs Intravenous Stopped 7/20/20 2246)   sodium chloride (PF) 0.9% PF flush 88 mL (90 mLs Intravenous Given 7/20/20 2044)   iopamidol (ISOVUE-370) solution 58 mL (58 mLs Intravenous Given 7/20/20 2044)   rOPINIRole (REQUIP) tablet 0.5 mg (0.5 mg Oral Given 7/20/20 2155)   acetaminophen (TYLENOL) tablet 650 mg (650 mg Oral Given 7/20/20 2155)   albuterol (PROAIR HFA/PROVENTIL HFA/VENTOLIN HFA) 108 (90 Base) MCG/ACT inhaler 2 puff (2 puffs Inhalation Given 7/20/20 2248)             Assessments & Plan (with Medical Decision Making)  87-year-old female presented the ER with dyspnea exertion for the last week.  No chest pain with  this no upper respiratory symptoms etc. she has chronic lower extremity swelling she has chronic right knee pain.  Patient currently has been treated for a urothelial bladder cancer getting intravesicular BCG instillations.  Patient has been feeling fine no history cardiac disease except for some questional history of A. fib in the past.  Patient presented the ER for evaluation her EKG appeared normal her chest x-ray did not show any acute findings her labs troponin was negative BNP was 900.  We did do a CT scan to rule out PE etc. which did not show any PE or other acute abnormalities causing her symptoms patient this point had COVID testing which was negative also.  She is not anemic otherwise she appears well and would like to go home.  Patient given albuterol inhaler to use to see if there is any bronchospastic component to her symptoms and will follow-up with MD and return if any concerns patient contacted daughter-in-law to pick it up and was then discharged.           I have reviewed the nursing notes.    I have reviewed the findings, diagnosis, plan and need for follow up with the patient.    Discharge Medication List as of 7/20/2020 10:54 PM          Final diagnoses:   Shortness of breath     IShavon, am serving as a trained medical scribe to document services personally performed by Lorne Macdonald MD, based on the provider's statements to me.      I, Lorne Macdonald MD, was physically present and have reviewed and verified the accuracy of this note documented by Shavon Adams.     7/20/2020   [facility] EMERGENCY DEPARTMENT    This note was created at least in part by the use of dragon voice dictation system. Inadvertent typographical errors may still exist.  Lorne Macdonald MD.    Patient evaluated in the emergency department during the COVID-19 pandemic period. Careful attention to patients safety was addressed throughout the evaluation. Evaluation and treatment management was initiated with  disposition made efficiently and appropriate as possible to minimize any risk of potential exposure to patient during this evaluation.       Lorne Macdonald MD  07/21/20 3539

## 2020-07-20 NOTE — ED TRIAGE NOTES
"Patient BIBA from independent co-op apartment. Patient reports \"trouble breathing\" for last week with exertion. Patient reports Hx of a-fib. Denies fever or cough at home. BS 86 per EMS.   "

## 2020-07-21 LAB — INTERPRETATION ECG - MUSE: NORMAL

## 2020-07-21 ASSESSMENT — ENCOUNTER SYMPTOMS
CHOKING: 0
NAUSEA: 0
BRUISES/BLEEDS EASILY: 0
WEAKNESS: 1
LIGHT-HEADEDNESS: 0
WOUND: 0
CONFUSION: 0
HEADACHES: 0
DYSPHORIC MOOD: 0
CHEST TIGHTNESS: 0
TROUBLE SWALLOWING: 0
ARTHRALGIAS: 1
DECREASED CONCENTRATION: 0
ABDOMINAL PAIN: 0
SORE THROAT: 0
VOMITING: 0

## 2020-07-21 NOTE — DISCHARGE INSTRUCTIONS
Home.  You are comfortable going home.  Your ekg and heart tests and labs look good.  Your xray did not show any pneumonia.  Your CT scan of your chest did not show any sign of blood clot or infection or fluid.  Your COVID test was NEGATIVE done today.  Use the albuterol inhaler 2 puffs 4 times a day to see if this helps breathing.   See MD for follow up.  Return if any concerns at all.    Results for orders placed or performed during the hospital encounter of 07/20/20   XR Chest Port 1 View     Status: None    Narrative    XR CHEST PORT 1 VW, 7/20/2020 6:46 PM.    Comparison: 4/8/2020.    History: sob.    Technique: AP radiograph the chest    Findings:   Cardiomediastinal silhouette is within normal limits. Pulmonary  vasculature is distinct. No acute airspace opacities. No pleural  effusion. No pneumothorax. No acute intra-abdominal abnormalities.      Impression    Impression:   No acute cardiopulmonary disease.     I have personally reviewed the examination and initial interpretation  and I agree with the findings.    ALEJANDRA CROUCH MD   CT Chest Pulmonary Embolism w Contrast     Status: None (Preliminary result)    Narrative    EXAMINATION: CTA pulmonary angiogram, 7/20/2020 9:06 PM     COMPARISON: CT 4/8/2020    HISTORY: Dyspnea on exertion and underlying cancer evaluation for PE  versus other cause.    TECHNIQUE: Volumetric helical acquisition of CT images of the chest  from the lung apices to the kidneys were acquired after the  administration of 58 mm mL of Isovue-370 IV contrast. Flash technique  with free breathing acquisition.  Post-processed multiplanar and/or  MIP reformations were obtained, archived to PACS and used in  interpretation of this study.     FINDINGS:  Contrast bolus is: adequate.  Exam is negative for acute  pulmonary embolism.       Multinodular thyroid, similar in appearance to prior exam. There is no  thoracic adenopathy. Heart size is enlarged. There is no  pericardial  effusion. Normal configuration of the great vessels. Mild to moderate  atheromatous plaquing throughout the aortic arch. Aortic valve  calcifications. Thoracic esophagus is within normal limits. There is a  small to moderate-sized hiatal hernia.    No pleural effusion. No pneumothorax. Mild hilar bronchial wall  thickening. No suspicious pulmonary nodules.    Limited evaluation of the upper abdomen. Cholecystectomy clips. Hiatal  hernia as discussed above. Spleen, visualized portions of the kidneys,  and adrenal glands are within normal limits.     No suspicious osseous lesions. Exaggerated kyphotic curve of the  thoracic spine. Soft tissue is unremarkable.      Impression    IMPRESSION:   1. Exam is negative  for acute pulmonary embolism.   2. Cardiomegaly.  3. Stable multinodular thyroid.    In the event of a positive result for acute pulmonary embolism  resulting in right heart strain, please activate the PERT  Multidisciplinary group for consultation by paging [phone]).     PERT -- Pulmonary Embolism Response Team (Multidisciplinary team  including cardiology, interventional radiology, critical care,  hematology)   CBC with platelets differential     Status: None   Result Value Ref Range    WBC 7.1 4.0 - 11.0 10e9/L    RBC Count 4.04 3.8 - 5.2 10e12/L    Hemoglobin 12.9 11.7 - 15.7 g/dL    Hematocrit 39.8 35.0 - 47.0 %    MCV 99 78 - 100 fl    MCH 31.9 26.5 - 33.0 pg    MCHC 32.4 31.5 - 36.5 g/dL    RDW 12.6 10.0 - 15.0 %    Platelet Count 224 150 - 450 10e9/L    Diff Method Automated Method     % Neutrophils 64.3 %    % Lymphocytes 23.9 %    % Monocytes 9.0 %    % Eosinophils 1.8 %    % Basophils 0.7 %    % Immature Granulocytes 0.3 %    Nucleated RBCs 0 0 /100    Absolute Neutrophil 4.6 1.6 - 8.3 10e9/L    Absolute Lymphocytes 1.7 0.8 - 5.3 10e9/L    Absolute Monocytes 0.6 0.0 - 1.3 10e9/L    Absolute Eosinophils 0.1 0.0 - 0.7 10e9/L    Absolute Basophils 0.1 0.0 - 0.2 10e9/L    Abs  Immature Granulocytes 0.0 0 - 0.4 10e9/L    Absolute Nucleated RBC 0.0    INR     Status: None   Result Value Ref Range    INR 0.95 0.86 - 1.14   Partial thromboplastin time     Status: None   Result Value Ref Range    PTT 27 22 - 37 sec   Comprehensive metabolic panel     Status: Abnormal   Result Value Ref Range    Sodium 134 133 - 144 mmol/L    Potassium 4.8 3.4 - 5.3 mmol/L    Chloride 101 94 - 109 mmol/L    Carbon Dioxide 30 20 - 32 mmol/L    Anion Gap 3 3 - 14 mmol/L    Glucose 90 70 - 99 mg/dL    Urea Nitrogen 25 7 - 30 mg/dL    Creatinine 0.98 0.52 - 1.04 mg/dL    GFR Estimate 52 (L) >60 mL/min/[1.73_m2]    GFR Estimate If Black 60 (L) >60 mL/min/[1.73_m2]    Calcium 8.8 8.5 - 10.1 mg/dL    Bilirubin Total 0.5 0.2 - 1.3 mg/dL    Albumin 3.7 3.4 - 5.0 g/dL    Protein Total 7.6 6.8 - 8.8 g/dL    Alkaline Phosphatase 90 40 - 150 U/L    ALT 21 0 - 50 U/L    AST 30 0 - 45 U/L   TSH     Status: None   Result Value Ref Range    TSH 3.08 0.40 - 4.00 mU/L   Troponin I     Status: None   Result Value Ref Range    Troponin I ES <0.015 0.000 - 0.045 ug/L   Nt probnp inpatient (BNP)     Status: None   Result Value Ref Range    N-Terminal Pro BNP Inpatient 929 0 - 1,800 pg/mL   Symptomatic COVID-19 Virus (Coronavirus) by PCR     Status: None    Specimen: Nasopharyngeal   Result Value Ref Range    COVID-19 Virus PCR to [facility] - Source Nasopharyngeal     COVID-19 Virus PCR to [facility] - Result       Test received-See reflex to IDDL test SARS CoV2 (COVID-19) Virus RT-PCR   SARS-CoV-2 COVID-19 Virus (Coronavirus) RT-PCR Nasopharyngeal     Status: None    Specimen: Nasopharyngeal   Result Value Ref Range    SARS-CoV-2 Virus Specimen Source Nasopharyngeal     SARS-CoV-2 PCR Result NEGATIVE     SARS-CoV-2 PCR Comment       Testing was performed using the [employer] Xpress SARS-CoV-2 Assay on the Cepheid Gene-Xpert   Instrument Systems. Additional information about this Emergency Use Authorization (EUA)   assay can be found via the  Lab Guide.     EKG 12-lead, tracing only     Status: None (Preliminary result)   Result Value Ref Range    Interpretation ECG Click View Image link to view waveform and result

## 2020-07-23 ASSESSMENT — ENCOUNTER SYMPTOMS
DIFFICULTY URINATING: 0
POLYDIPSIA: 0
SMELL DISTURBANCE: 0
EYE IRRITATION: 0
LIGHT-HEADEDNESS: 0
SORE THROAT: 0
DOUBLE VISION: 0
TASTE DISTURBANCE: 0
NIGHT SWEATS: 0
INCREASED ENERGY: 0
DECREASED APPETITE: 0
SLEEP DISTURBANCES DUE TO BREATHING: 0
WEIGHT LOSS: 0
PALPITATIONS: 0
POLYPHAGIA: 0
HALLUCINATIONS: 0
SYNCOPE: 0
NECK MASS: 0
FEVER: 0
FATIGUE: 0
SINUS CONGESTION: 0
EYE REDNESS: 0
EXERCISE INTOLERANCE: 0
SINUS PAIN: 0
TROUBLE SWALLOWING: 0
WEIGHT GAIN: 0
LEG PAIN: 1
EYE PAIN: 0
CHILLS: 0
DYSURIA: 0
HEMATURIA: 0
ORTHOPNEA: 0
FLANK PAIN: 0
ALTERED TEMPERATURE REGULATION: 0
HYPOTENSION: 0
HOARSE VOICE: 1
HYPERTENSION: 1

## 2020-07-24 ENCOUNTER — OFFICE VISIT (OUTPATIENT)
Dept: UROLOGY | Facility: CLINIC | Age: 85
End: 2020-07-24
Payer: MEDICARE

## 2020-07-24 VITALS — BODY MASS INDEX: 34.52 KG/M2 | HEIGHT: 57 IN | WEIGHT: 160 LBS

## 2020-07-24 DIAGNOSIS — C68.9 UROTHELIAL CANCER (H): Primary | ICD-10-CM

## 2020-07-24 DIAGNOSIS — C68.9 UROTHELIAL CARCINOMA (H): ICD-10-CM

## 2020-07-24 PROCEDURE — 88112 CYTOPATH CELL ENHANCE TECH: CPT | Performed by: UROLOGY

## 2020-07-24 PROCEDURE — 88120 CYTP URNE 3-5 PROBES EA SPEC: CPT | Performed by: UROLOGY

## 2020-07-24 RX ORDER — LIDOCAINE HYDROCHLORIDE 20 MG/ML
JELLY TOPICAL ONCE
Status: COMPLETED | OUTPATIENT
Start: 2020-07-24 | End: 2020-07-24

## 2020-07-24 RX ADMIN — LIDOCAINE HYDROCHLORIDE: 20 JELLY TOPICAL at 09:59

## 2020-07-24 ASSESSMENT — MIFFLIN-ST. JEOR: SCORE: 1034.64

## 2020-07-24 ASSESSMENT — PAIN SCALES - GENERAL: PAINLEVEL: NO PAIN (0)

## 2020-07-24 NOTE — PATIENT INSTRUCTIONS
BCG-Message sent to Kiera YANG R.N. 07/24/20-PJ    Cysto in 3 Mo, you are okay to go for your Knee surgery.    Edward Wright, EMT     It was a pleasure meeting with you today.  Thank you for allowing me and my team the privilege of caring for you today.  YOU are the reason we are here, and I truly hope we provided you with the excellent service you deserve.  Please let us know if there is anything else we can do for you so that we can be sure you are leaving completely satisfied with your care experience.

## 2020-07-24 NOTE — LETTER
2020       RE: Dimple Oviedo  [address]  [facility] [place] [address]     Dear Colleague,    Thank you for referring your patient, Dimple Oviedo, to the [facility] UROLOGY AND INST FOR PROSTATE AND UROLOGIC CANCERS at [facility]. Please see a copy of my visit note below.    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 She completed chemotherapy (gem/carbo).  Stable to no evidence of disease in the upper tracts.     2. 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 Got 6 of BCG and tolerated it well. Now with negative biopsies 2020     Waiting on getting her knee surgery for the BCG to be completed.     Plan for 3 weekly maintenance BCG treatments at 3, 6, 12, 18, 24, 30, and 36 months.  Now at 3 months from 2020 2nd round of BCG.     I will notify Dr Otero that she can proceed with knee surgery pending cytology today.    Plan  -maintenance bcg now  -return to clinic 3 months for cystoscopy.  -Dr Toledo has a CT Chest Abdomen and Pelvis with and without contrast ordered.  ______________________________________________________________________     HPI  Dimple Oviedo is a 86 year old female with a history of high grade upper tract urothelial cancer (pT4N1) here for follow up after right nephroureterectomy on 18. The patient is following yolanda Toledo of Hematology and Oncology.      Per Dr. Toledo's note, on 18 Mr. Oviedo- Started adjuvant chemotherapy (carbo+gem) per POUT trial. Cycle 2 - 19. Cycle 3 - 19. Cycle 4 - 19.     Date of last abdominal and pelvis imagin2019   Date of last chest imagin2019   Any recurrence? cystoscopy on 10/03/2019 showed small area of erythema. Bladder biopsy  from 01/13/2020 showed urothelial carcinoma in-situ , negative biopsy 5/2020  Intravesical Therapy: BCG X6 Feb 2020 (Full Strength)     She has right total knee arthoplasty planned for 02/17/2020. She has knee pain but is not crippled by the pain.  This was cancelled to get the BCG done, now on hold for COVID    Today she is doing well.     Review of Systems:   Pertinent items are noted in HPI or as below, remainder of complete ROS is negative.       Telephone Visit     Laboratory:   I reviewed all applicable laboratory and pathology data and went over findings with patient  Significant for            Lab Results   Component Value Date     CR 1.19 01/10/2020     CR 1.09 12/04/2019     CR 0.96 10/04/2019     CR 1.03 09/11/2019     CR 0.99 06/12/2019     CR 1.02 03/06/2019     CR 1.00 02/13/2019     CR 1.18 02/11/2019     CR 0.98 02/07/2019     CR 0.99 02/03/2019      Imaging:   I personally viewed all applicable images, interpreted them, and discussed findings with the patient.      CT 4/2020  IMPRESSION:   1. No evidence of recurrent/metastatic disease in the chest, abdomen,  or pelvis.   2. Stable surgical changes of right nephrectomy/ureterectomy.  3. Unchanged left renal artery aneurysm.  4. Unchanged nonobstructing calyceal stone in the left kidney.  5. Moderate hiatal hernia.    CYSTOSCOPY PROCEDURE:  Sterile preparation and drape and an informed consent were performed.  A 16-Ukrainian flexible cystoscope was introduced via the urethra.  The urethra was open.  The bladder mucosa showed no evidence of any lesions or trabeculation.  There were no stones.    Again, thank you for allowing me to participate in the care of your patient.      Sincerely,    Justin Henry MD

## 2020-07-24 NOTE — PROGRESS NOTES
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 She completed chemotherapy (gem/carbo).  Stable to no evidence of disease in the upper tracts.     2. 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 Got 6 of BCG and tolerated it well. Now with negative biopsies 2020     Waiting on getting her knee surgery for the BCG to be completed.     Plan for 3 weekly maintenance BCG treatments at 3, 6, 12, 18, 24, 30, and 36 months.  Now at 3 months from 2020 2nd round of BCG.     I will notify Dr Otero that she can proceed with knee surgery pending cytology today.    Plan  -maintenance bcg now  -return to clinic 3 months for cystoscopy.  -Dr Toledo has a CT Chest Abdomen and Pelvis with and without contrast ordered.  ______________________________________________________________________     HPI  Dimple Oviedo is a 86 year old female with a history of high grade upper tract urothelial cancer (pT4N1) here for follow up after right nephroureterectomy on 18. The patient is following yolanda Toledo of Hematology and Oncology.      Per Dr. Toledo's note, on 18 Mr. Oviedo- Started adjuvant chemotherapy (carbo+gem) per POUT trial. Cycle 2 - 19. Cycle 3 - 19. Cycle 4 - 19.     Date of last abdominal and pelvis imagin2019   Date of last chest imagin2019   Any recurrence? cystoscopy on 10/03/2019 showed small area of erythema. Bladder biopsy from 2020 showed urothelial carcinoma in-situ , negative biopsy 2020  Intravesical Therapy: BCG X6 2020 (Full Strength)     She has right total knee arthoplasty planned for 2020. She has knee pain but is not crippled by the pain.  This was cancelled to get the BCG done, now on hold for COVID    Today she is  doing well.     Review of Systems:   Pertinent items are noted in HPI or as below, remainder of complete ROS is negative.       Telephone Visit     Laboratory:   I reviewed all applicable laboratory and pathology data and went over findings with patient  Significant for            Lab Results   Component Value Date     CR 1.19 01/10/2020     CR 1.09 12/04/2019     CR 0.96 10/04/2019     CR 1.03 09/11/2019     CR 0.99 06/12/2019     CR 1.02 03/06/2019     CR 1.00 02/13/2019     CR 1.18 02/11/2019     CR 0.98 02/07/2019     CR 0.99 02/03/2019      Imaging:   I personally viewed all applicable images, interpreted them, and discussed findings with the patient.      CT 4/2020  IMPRESSION:   1. No evidence of recurrent/metastatic disease in the chest, abdomen,  or pelvis.   2. Stable surgical changes of right nephrectomy/ureterectomy.  3. Unchanged left renal artery aneurysm.  4. Unchanged nonobstructing calyceal stone in the left kidney.  5. Moderate hiatal hernia.    CYSTOSCOPY PROCEDURE:  Sterile preparation and drape and an informed consent were performed.  A 16-Congolese flexible cystoscope was introduced via the urethra.  The urethra was open.  The bladder mucosa showed no evidence of any lesions or trabeculation.  There were no stones.

## 2020-07-24 NOTE — NURSING NOTE
"Chief Complaint   Patient presents with     Cystoscopy     Cancer survailance       Height 1.448 m (4' 9\"), weight 72.6 kg (160 lb), not currently breastfeeding. Body mass index is 34.62 kg/m .    Patient Active Problem List   Diagnosis     Esophageal reflux     Restless leg syndrome     heat intolerance     Goiter     Disorder of bone and cartilage     Other psoriasis     perirectal cyst     Malignant melanoma of skin of trunk, except scrotum (H)     Nontoxic multinodular goiter     Hyperlipidemia LDL goal <130     Hypertension goal BP (blood pressure) < 140/90     Urinary incontinence     Hip arthritis     Polymyalgia rheumatica (H)     High risk medication use     Shoulder pain     Impaired fasting blood sugar     Chronic bilateral low back pain without sciatica     Obesity, unspecified obesity severity, unspecified obesity type     Iron deficiency anemia, unspecified iron deficiency anemia type     NSTEMI (non-ST elevated myocardial infarction) (H)     Stress-induced cardiomyopathy     A-fib (H)     Anxiety     Chronic systolic heart failure (H)     Urothelial carcinoma (H)     Hyperthyroidism     Restless legs syndrome     CRESENCIO (acute kidney injury) (H)     Hydronephrosis     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)     Graves disease     Encounter for antineoplastic chemotherapy     Chemotherapy-induced neutropenia (H)     Primary localized osteoarthritis of right knee     Urothelial cancer (H)       Allergies   Allergen Reactions     Codeine        Current Outpatient Medications   Medication Sig Dispense Refill     acetaminophen (TYLENOL) 650 MG CR tablet Take 1 tablet (650 mg) by mouth every 8 hours as needed for pain (Patient taking differently: Take 1,300 mg by mouth 2 times daily as needed for pain (PT last dose 1.10.2020) ) 100 tablet 0     alendronate (FOSAMAX) 70 MG tablet TAKE 1 TABLET EVERY 7 DAYS AT LEAST 60 MINUTES BEFORE BREAKFAST AS DIRECTED. 12 tablet 3     aspirin 81 MG tablet Take 81 mg " by mouth every evening        Calcium Citrate-Vitamin D (CALCIUM + D PO) Take 1 tablet by mouth daily        cycloSPORINE (RESTASIS) 0.05 % ophthalmic emulsion Place 1 drop into both eyes 2 times daily       ferrous sulfate 325 (65 Fe) MG TBEC EC tablet Take 325 mg by mouth every morning        furosemide (LASIX) 20 MG tablet Take 1 tablet (20 mg) by mouth every morning 90 tablet 3     irbesartan (AVAPRO) 300 MG tablet TAKE 1 TABLET EVERY DAY (Patient taking differently: Take 300 mg by mouth every morning TAKE 1 TABLET EVERY DAY) 90 tablet 3     levofloxacin (LEVAQUIN) 500 MG tablet Take 1 tablet 6 hours post treatment and 1 tablet each AM following each treatment. 6 tablet 6     lovastatin (MEVACOR) 40 MG tablet TAKE 1 TABLET AT BEDTIME (HYPERLIPIDEMIA LDL GOAL BELOW 130) 90 tablet 3     methimazole (TAPAZOLE) 5 MG tablet Take 1 tablet (5 mg) by mouth daily 90 tablet 3     nitroGLYcerin (NITROSTAT) 0.4 MG sublingual tablet For chest pain place 1 tablet under the tongue every 5 minutes for 3 doses. If symptoms persist 5 minutes after 1st dose call 911. 25 tablet 3     Omega-3 Fatty Acids (FISH OIL) 500 MG CAPS Take 1 capsule by mouth daily        omeprazole (PRILOSEC) 20 MG DR capsule TAKE 1 CAPSULE EVERY DAY (Patient taking differently: Take 20 mg by mouth every morning TAKE 1 CAPSULE EVERY DAY) 90 capsule 3     Probiotic Product (PROBIOTIC ADVANCED PO) Take 1 capsule by mouth every morning        propranolol ER (INDERAL LA) 60 MG 24 hr capsule Take 1 capsule (60 mg) by mouth every morning 90 capsule 1     rOPINIRole (REQUIP) 0.25 MG tablet TAKE 1 TABLET IN THE AFTERNOON AND TAKE 2 TABLETS EVERY NIGHT 270 tablet 1     sertraline (ZOLOFT) 100 MG tablet TAKE 1 TABLET (100 MG) BY MOUTH EVERY MORNING 90 tablet 2     traZODone (DESYREL) 50 MG tablet TAKE 1/2 TABLET AT BEDTIME 45 tablet 2       Social History     Tobacco Use     Smoking status: Never Smoker     Smokeless tobacco: Never Used   Substance Use Topics      Alcohol use: No     Alcohol/week: 0.0 standard drinks     Drug use: No       Invasive Procedure Safety Checklist:    Procedure: Cystoscopy    Action: Complete sections and checkboxes as appropriate.    Pre-procedure:  1. Patient ID Verified with 2 identifiers (Bettie and  or MRN) : YES    2. Procedure and site verified with patient/designee (when able) : YES    3. Accurate consent documentation in medical record : YES    4. H&P (or appropriate assessment) documented in medical record : N/A  H&P must be up to 30 days prior to procedure an updated within 24 hours of                 Procedure as applicable.     5. Relevant diagnostic and radiology test results appropriately labeled and displayed as applicable : YES    6. Blood products, implants, devices, and/or special equipment available for the procedure as applicable : YES    7. Procedure site(s) marked with provider initials [Exclusions: none] : NO    8. Marking not required. Reason : Yes  Procedure does not require site marking    Time Out:     Time-Out performed immediately prior to starting procedure, including verbal and active participation of all team members addressing: YES    1. Correct patient identity.  2. Confirmed that the correct side and site are marked.  3. An accurate procedure to be done.  4. Agreement on the procedure to be done.  5. Correct patient position.  6. Relevant images and results are properly labeled and appropriately displayed.  7. The need to administer antibiotics or fluids for irrigation purposes during the procedure as applicable.  8. Safety precautions based on patient history or medication use.    During Procedure: Verification of correct person, site, and procedure occurs any time the responsibility for care of the patient is transferred to another member of the care team.    The following medication was given:     MEDICATION: Lidocaine Uro-Jet 2% 200mg (20mg/mL)  ROUTE: Urethral   SITE: Urethra   DOSE: 10mL  LOT #:  [id number]  : [employer]   EXPIRATION DATE: 3-22  NDC#: [address]-84   Was there drug waste? No    Prior to injection, verified patient identity using patient's name and date of birth.  Due to injection administration, patient instructed to remain in clinic for 15 minutes  afterwards, and to report any adverse reaction to me immediately.    Drug Amount Wasted:  None.  Vial/Syringe: Single dose vial      SAIMA Zambrano  7/24/2020  9:58 AM

## 2020-07-27 ENCOUNTER — ANCILLARY PROCEDURE (OUTPATIENT)
Dept: CT IMAGING | Facility: CLINIC | Age: 85
End: 2020-07-27
Attending: INTERNAL MEDICINE
Payer: MEDICARE

## 2020-07-27 DIAGNOSIS — C66.1 UROTHELIAL CARCINOMA OF RIGHT DISTAL URETER (H): ICD-10-CM

## 2020-07-27 DIAGNOSIS — E05.00 GRAVES DISEASE: ICD-10-CM

## 2020-07-27 LAB
T3 SERPL-MCNC: 73 NG/DL (ref 60–181)
T4 FREE SERPL-MCNC: 0.73 NG/DL (ref 0.76–1.46)
TSH SERPL DL<=0.005 MIU/L-ACNC: 3.39 MU/L (ref 0.4–4)

## 2020-07-27 PROCEDURE — 84480 ASSAY TRIIODOTHYRONINE (T3): CPT | Performed by: INTERNAL MEDICINE

## 2020-07-27 NOTE — PROGRESS NOTES
"Dimple Oviedo is a 87 year old female who is being evaluated via a billable telephone visit.      The patient has been notified of following:     \"This telephone visit will be conducted via a call between you and your physician/provider. We have found that certain health care needs can be provided without the need for a physical exam.  This service lets us provide the care you need with a short phone conversation.  If a prescription is necessary we can send it directly to your pharmacy.  If lab work is needed we can place an order for that and you can then stop by our lab to have the test done at a later time.    Telephone visits are billed at different rates depending on your insurance coverage. During this emergency period, for some insurers they may be billed the same as an in-person visit.  Please reach out to your insurance provider with any questions.    If during the course of the call the physician/provider feels a telephone visit is not appropriate, you will not be charged for this service.\"    Patient has given verbal consent for Telephone visit?  Yes    What phone number would you like to be contacted at? [phone]    How would you like to obtain your AVS? Mail a copy    Phone call duration: 7 minutes    Dhara Meza MD    This 87 year old woman was called for f/u of Graves.  She first developed symptoms of hyperthyroidism in December 2017.  She was most troubled by the tremor that occurred.  She had not noticed change in her skin or hair.  She saw Dr. Rojas in the community and he started her on methimazole.  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  Because she had received an iodine dye load when she had a coronary angiogram, we were unable to do a radioiodine scan and uptake to determine the precise cause of her hyperthyroidism.  She was maintained on methimazole until June 2017 when it was stopped.  A " radioiodine uptake and scan was done in July, showed an uptake of ~ 70%, and confirmed she had Graves' disease.  She was restarted on methimazole in July 2018 and we have been adjusting the dose since then.  She has been taking 5 mg every AM every day.      On this dose, she feels well.  Her energy level is good.  Her appetite is fine.  She feels hotter when she walks than she did in the past, but has no change in her hair or skin.   She has no palpitations or shortness of breath.  She did go to the ER with shortness of breath about a week ago but no cause was found.  The symptoms did not recur.  She is not having tremor.  She denies diplopia or periorbital swelling.    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  She is being carefully monitored for recurrence.  Her primary complaint today is of pain in her right knee.  Her surgery was put off because of the cold epidemic and she is eager to get her knee replaced.  She is now walking with a walker without difficulty.    Current Outpatient Medications   Medication     acetaminophen (TYLENOL) 650 MG CR tablet     alendronate (FOSAMAX) 70 MG tablet     aspirin 81 MG tablet     Calcium Citrate-Vitamin D (CALCIUM + D PO)     cycloSPORINE (RESTASIS) 0.05 % ophthalmic emulsion     ferrous sulfate 325 (65 Fe) MG TBEC EC tablet     furosemide (LASIX) 20 MG tablet     irbesartan (AVAPRO) 300 MG tablet     levofloxacin (LEVAQUIN) 500 MG tablet     lovastatin (MEVACOR) 40 MG tablet     methimazole (TAPAZOLE) 5 MG tablet     nitroGLYcerin (NITROSTAT) 0.4 MG sublingual tablet     Omega-3 Fatty Acids (FISH OIL) 500 MG CAPS     omeprazole (PRILOSEC) 20 MG DR capsule     Probiotic Product (PROBIOTIC ADVANCED PO)     propranolol ER (INDERAL LA) 60 MG 24 hr capsule     rOPINIRole (REQUIP) 0.25 MG tablet     sertraline (ZOLOFT) 100 MG tablet     traZODone (DESYREL) 50 MG tablet     No current facility-administered medications for this visit.       On exam she is in no acute distress.  Her mood is good.    ENDO THYROID LABS-[facility] Latest Ref Rng & Units 7/27/2020 7/20/2020   TSH 0.40 - 4.00 mU/L 3.39 3.08   T4 FREE 0.76 - 1.46 ng/dL 0.73 (L)    FREE T3 2.3 - 4.2 pg/mL     TRIIODOTHYRONINE(T3) 60 - 181 ng/dL 73    THYROGLOBULIN ANTIBODY <40 IU/mL     THYR PEROXIDASE ZARA <35 IU/mL     THYROID STIM IMMUNOG <=1.3 TSI index       ENDO THYROID LABS-[facility] Latest Ref Rng & Units 1/28/2020 9/11/2019   TSH 0.40 - 4.00 mU/L 1.43 1.45   T4 FREE 0.76 - 1.46 ng/dL 0.80 0.74 (L)   FREE T3 2.3 - 4.2 pg/mL     TRIIODOTHYRONINE(T3) 60 - 181 ng/dL 95 76   THYROGLOBULIN ANTIBODY <40 IU/mL     THYR PEROXIDASE ZARA <35 IU/mL     THYROID STIM IMMUNOG <=1.3 TSI index 3.2 (H) 3.2 (H)     ENDO THYROID LABS-[facility] Latest Ref Rng & Units 7/30/2019   TSH 0.40 - 4.00 mU/L 10.94 (H)   T4 FREE 0.76 - 1.46 ng/dL    FREE T3 2.3 - 4.2 pg/mL 1.8 (L)   TRIIODOTHYRONINE(T3) 60 - 181 ng/dL 73   THYROGLOBULIN ANTIBODY <40 IU/mL    THYR PEROXIDASE ZARA <35 IU/mL    THYROID STIM IMMUNOG <=1.3 TSI index 2.0 (H)     Assessment and plan: Ms. Oviedo has Graves' disease that is being managed with low doses of methimazole.  Her symptoms of hyperthyroidism have resolved and her current thyroid function tests are in target.  We will continue this dose for now.  I will plan to see her in 6 months with a repeat lab test before hand.  I once again warned her of side effects of rash and agranulocytosis.  She should seek medical attention should she get a fever or a  sore throat.    Dhara Meza MD

## 2020-07-28 ENCOUNTER — VIRTUAL VISIT (OUTPATIENT)
Dept: ENDOCRINOLOGY | Facility: CLINIC | Age: 85
End: 2020-07-28
Payer: MEDICARE

## 2020-07-28 DIAGNOSIS — E05.00 GRAVES DISEASE: Primary | ICD-10-CM

## 2020-07-28 NOTE — LETTER
"7/28/2020       RE: Dimple Oviedo  [address]  [place] [address]     Dear Colleague,    Thank you for referring your patient, Dimple Oviedo, to the [facility] ENDOCRINOLOGY at [facility]. Please see a copy of my visit note below.    Dimple Oviedo is a 87 year old female who is being evaluated via a billable telephone visit.      The patient has been notified of following:     \"This telephone visit will be conducted via a call between you and your physician/provider. We have found that certain health care needs can be provided without the need for a physical exam.  This service lets us provide the care you need with a short phone conversation.  If a prescription is necessary we can send it directly to your pharmacy.  If lab work is needed we can place an order for that and you can then stop by our lab to have the test done at a later time.    Telephone visits are billed at different rates depending on your insurance coverage. During this emergency period, for some insurers they may be billed the same as an in-person visit.  Please reach out to your insurance provider with any questions.    If during the course of the call the physician/provider feels a telephone visit is not appropriate, you will not be charged for this service.\"    Patient has given verbal consent for Telephone visit?  Yes    What phone number would you like to be contacted at? [phone]    How would you like to obtain your AVS? Mail a copy    Phone call duration: 7 minutes    Dhara Meza MD    This 87 year old woman was called for f/u of Graves.  She first developed symptoms of hyperthyroidism in December 2017.  She was most troubled by the tremor that occurred.  She had not noticed change in her skin or hair.  She saw Dr. Rojas in the community and he started her on methimazole.  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  Because she had received an iodine dye load when she had a coronary angiogram, we were unable to do a radioiodine scan and uptake to determine the precise cause of her hyperthyroidism.  She was maintained on methimazole until June 2017 when it was stopped.  A radioiodine uptake and scan was done in July, showed an uptake of ~ 70%, and confirmed she had Graves' disease.  She was restarted on methimazole in July 2018 and we have been adjusting the dose since then.  She has been taking 5 mg every AM every day.      On this dose, she feels well.  Her energy level is good.  Her appetite is fine.  She feels hotter when she walks than she did in the past, but has no change in her hair or skin.   She has no palpitations or shortness of breath.  She did go to the ER with shortness of breath about a week ago but no cause was found.  The symptoms did not recur.  She is not having tremor.  She denies diplopia or periorbital swelling.    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  She is being carefully monitored for recurrence.  Her primary complaint today is of pain in her right knee.  Her surgery was put off because of the cold epidemic and she is eager to get her knee replaced.  She is now walking with a walker without difficulty.    Current Outpatient Medications   Medication     acetaminophen (TYLENOL) 650 MG CR tablet     alendronate (FOSAMAX) 70 MG tablet     aspirin 81 MG tablet     Calcium Citrate-Vitamin D (CALCIUM + D PO)     cycloSPORINE (RESTASIS) 0.05 % ophthalmic emulsion     ferrous sulfate 325 (65 Fe) MG TBEC EC tablet     furosemide (LASIX) 20 MG tablet     irbesartan (AVAPRO) 300 MG tablet     levofloxacin (LEVAQUIN) 500 MG tablet     lovastatin (MEVACOR) 40 MG tablet     methimazole (TAPAZOLE) 5 MG tablet     nitroGLYcerin (NITROSTAT) 0.4 MG sublingual tablet     Omega-3 Fatty Acids (FISH OIL) 500 MG CAPS     omeprazole (PRILOSEC)  20 MG DR capsule     Probiotic Product (PROBIOTIC ADVANCED PO)     propranolol ER (INDERAL LA) 60 MG 24 hr capsule     rOPINIRole (REQUIP) 0.25 MG tablet     sertraline (ZOLOFT) 100 MG tablet     traZODone (DESYREL) 50 MG tablet     No current facility-administered medications for this visit.      On exam she is in no acute distress.  Her mood is good.    ENDO THYROID LABS-[facility] Latest Ref Rng & Units 7/27/2020 7/20/2020   TSH 0.40 - 4.00 mU/L 3.39 3.08   T4 FREE 0.76 - 1.46 ng/dL 0.73 (L)    FREE T3 2.3 - 4.2 pg/mL     TRIIODOTHYRONINE(T3) 60 - 181 ng/dL 73    THYROGLOBULIN ANTIBODY <40 IU/mL     THYR PEROXIDASE ZARA <35 IU/mL     THYROID STIM IMMUNOG <=1.3 TSI index       ENDO THYROID LABS-[facility] Latest Ref Rng & Units 1/28/2020 9/11/2019   TSH 0.40 - 4.00 mU/L 1.43 1.45   T4 FREE 0.76 - 1.46 ng/dL 0.80 0.74 (L)   FREE T3 2.3 - 4.2 pg/mL     TRIIODOTHYRONINE(T3) 60 - 181 ng/dL 95 76   THYROGLOBULIN ANTIBODY <40 IU/mL     THYR PEROXIDASE ZARA <35 IU/mL     THYROID STIM IMMUNOG <=1.3 TSI index 3.2 (H) 3.2 (H)     ENDO THYROID LABS-[facility] Latest Ref Rng & Units 7/30/2019   TSH 0.40 - 4.00 mU/L 10.94 (H)   T4 FREE 0.76 - 1.46 ng/dL    FREE T3 2.3 - 4.2 pg/mL 1.8 (L)   TRIIODOTHYRONINE(T3) 60 - 181 ng/dL 73   THYROGLOBULIN ANTIBODY <40 IU/mL    THYR PEROXIDASE ZARA <35 IU/mL    THYROID STIM IMMUNOG <=1.3 TSI index 2.0 (H)     Assessment and plan: Ms. Oviedo has Graves' disease that is being managed with low doses of methimazole.  Her symptoms of hyperthyroidism have resolved and her current thyroid function tests are in target.  We will continue this dose for now.  I will plan to see her in 6 months with a repeat lab test before hand.  I once again warned her of side effects of rash and agranulocytosis.  She should seek medical attention should she get a fever or a  sore throat.    Dhara Meza MD

## 2020-07-28 NOTE — PATIENT INSTRUCTIONS
Keep taking 5 mg of methimazole each day.     Have your lab test done the week before our next visit.

## 2020-08-03 LAB — COPATH REPORT: NORMAL

## 2020-08-04 DIAGNOSIS — Z51.11 ENCOUNTER FOR ANTINEOPLASTIC CHEMOTHERAPY: ICD-10-CM

## 2020-08-04 DIAGNOSIS — C66.1 UROTHELIAL CARCINOMA OF RIGHT DISTAL URETER (H): Primary | ICD-10-CM

## 2020-08-04 DIAGNOSIS — D63.0 ANEMIA IN NEOPLASTIC DISEASE: ICD-10-CM

## 2020-08-05 ENCOUNTER — PREP FOR PROCEDURE (OUTPATIENT)
Dept: UROLOGY | Facility: CLINIC | Age: 85
End: 2020-08-05

## 2020-08-05 DIAGNOSIS — C67.8 MALIGNANT NEOPLASM OF OVERLAPPING SITES OF BLADDER (H): Primary | ICD-10-CM

## 2020-08-05 RX ORDER — CEFAZOLIN SODIUM 1 G/50ML
1 INJECTION, SOLUTION INTRAVENOUS SEE ADMIN INSTRUCTIONS
Status: CANCELLED | OUTPATIENT
Start: 2020-08-05

## 2020-08-05 RX ORDER — CEFAZOLIN SODIUM 2 G/50ML
2 SOLUTION INTRAVENOUS
Status: CANCELLED | OUTPATIENT
Start: 2020-08-05

## 2020-08-07 ENCOUNTER — TELEPHONE (OUTPATIENT)
Dept: FAMILY MEDICINE | Facility: CLINIC | Age: 85
End: 2020-08-07

## 2020-08-07 DIAGNOSIS — E78.5 HYPERLIPIDEMIA LDL GOAL <130: Primary | ICD-10-CM

## 2020-08-07 NOTE — TELEPHONE ENCOUNTER
Reason for Call:  Other order    Detailed comments: Patient is requesting lab orders be placed - whatever needs to be ordered to suffice for lovastatin (MEVACOR) 40 MG tablet. She is already scheduled for a lab appointment on 8/10/2020 in the evening. Is wondering if this then would be a fasting lab. Please follow up. Thanks!    Phone Number Patient can be reached at: Home number on file [phone] (home)    Best Time: Any    Can we leave a detailed message on this number? YES    Call taken on 8/7/2020 at 3:07 PM by Belkis Antony

## 2020-08-07 NOTE — TELEPHONE ENCOUNTER
Writer JUAREZ stating that the lab is fasting-no food and only clear liquids 10-12 hours prior to the lab.    Patient has other future orders already placed. Patient may want to change time of lab-may want to change to earlier in morning for fasting lab.    Encouraged to call back with questions.    Thanks! Mandi Dial RN

## 2020-08-10 DIAGNOSIS — D63.0 ANEMIA IN NEOPLASTIC DISEASE: ICD-10-CM

## 2020-08-10 DIAGNOSIS — C66.1 UROTHELIAL CARCINOMA OF RIGHT DISTAL URETER (H): ICD-10-CM

## 2020-08-10 LAB
ALBUMIN SERPL-MCNC: 3.4 G/DL (ref 3.4–5)
ALP SERPL-CCNC: 91 U/L (ref 40–150)
ALT SERPL W P-5'-P-CCNC: 21 U/L (ref 0–50)
ANION GAP SERPL CALCULATED.3IONS-SCNC: 5 MMOL/L (ref 3–14)
AST SERPL W P-5'-P-CCNC: 16 U/L (ref 0–45)
BILIRUB SERPL-MCNC: 0.5 MG/DL (ref 0.2–1.3)
BUN SERPL-MCNC: 19 MG/DL (ref 7–30)
CALCIUM SERPL-MCNC: 9.2 MG/DL (ref 8.5–10.1)
CHLORIDE SERPL-SCNC: 105 MMOL/L (ref 94–109)
CO2 SERPL-SCNC: 28 MMOL/L (ref 20–32)
CREAT SERPL-MCNC: 1.05 MG/DL (ref 0.52–1.04)
DIFFERENTIAL METHOD BLD: NORMAL
ERYTHROCYTE [DISTWIDTH] IN BLOOD BY AUTOMATED COUNT: 12.9 % (ref 10–15)
GFR SERPL CREATININE-BSD FRML MDRD: 48 ML/MIN/{1.73_M2}
GLUCOSE SERPL-MCNC: 99 MG/DL (ref 70–99)
HCT VFR BLD AUTO: 41 % (ref 35–47)
HGB BLD-MCNC: 13.1 G/DL (ref 11.7–15.7)
MCH RBC QN AUTO: 31.9 PG (ref 26.5–33)
MCHC RBC AUTO-ENTMCNC: 32 G/DL (ref 31.5–36.5)
MCV RBC AUTO: 100 FL (ref 78–100)
PLATELET # BLD AUTO: 231 10E9/L (ref 150–450)
POTASSIUM SERPL-SCNC: 4.2 MMOL/L (ref 3.4–5.3)
PROT SERPL-MCNC: 7.6 G/DL (ref 6.8–8.8)
RBC # BLD AUTO: 4.11 10E12/L (ref 3.8–5.2)
SODIUM SERPL-SCNC: 138 MMOL/L (ref 133–144)
WBC # BLD AUTO: 6.2 10E9/L (ref 4–11)

## 2020-08-10 PROCEDURE — 80053 COMPREHEN METABOLIC PANEL: CPT | Performed by: FAMILY MEDICINE

## 2020-08-10 PROCEDURE — 85025 COMPLETE CBC W/AUTO DIFF WBC: CPT | Performed by: FAMILY MEDICINE

## 2020-08-10 PROCEDURE — 36415 COLL VENOUS BLD VENIPUNCTURE: CPT | Performed by: FAMILY MEDICINE

## 2020-08-11 ENCOUNTER — VIRTUAL VISIT (OUTPATIENT)
Dept: ONCOLOGY | Facility: CLINIC | Age: 85
End: 2020-08-11
Attending: INTERNAL MEDICINE
Payer: MEDICARE

## 2020-08-11 DIAGNOSIS — C66.1 UROTHELIAL CARCINOMA OF RIGHT DISTAL URETER (H): Primary | ICD-10-CM

## 2020-08-11 DIAGNOSIS — C68.9 UROTHELIAL CARCINOMA (H): ICD-10-CM

## 2020-08-11 PROCEDURE — 40001009 ZZH VIDEO/TELEPHONE VISIT; NO CHARGE

## 2020-08-11 PROCEDURE — 99214 OFFICE O/P EST MOD 30 MIN: CPT | Mod: 95 | Performed by: INTERNAL MEDICINE

## 2020-08-11 NOTE — PROGRESS NOTES
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    HISTORY OF PRESENT ILLNESS:  Ms. 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 Her oncologic history is as under.    Ms. Oviedo is doing well overall. Unable to play cards due to COVID-19, but trying to stay active. The only issue is L knee pain due to OA. She denies fever, chills, shortness of breath or chest pain. There is no clinical evidence of disease progression.    She follows with Dr. Henry in urology on 7/24/20 and last cystoscopy was on the same day. It was negative. However, urine cytology was positive for high-grade urothelial carcinoma. Of note, bladder biopsy from 01/13/2020 showed urothelial carcinoma in-situ. She proceeded with intravesical BCG therapy x6 (2/12-3/18).      ONCOLOGIC HISTORY:  1. 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   - Jan 2018 to September 2018- Persistent gross hematuria and underwent multiple procedures.    - 2/19/18 and 4/3/18- cystoscopies with bladder biopsies  which were negative for bladder tumor  - 1/17/18, 3/8/18, 7/26/18, 9/10/18- Multiple urine cytologies have come back positive by FISH for high-grade transitional cell carcinoma  - 9/10/18- Underwent a bilateral cystoureteroscopy with biopsy and brushing that showed  right upper tract cytology suspicious for high-grade urothelial ca. Right ureter brushing negative from that day. Left upper tract negative.  - 9/10/18- CT A/P without contrast showed new small bilateral pleural effusions and interstitial pulmonary edema but no evidence of locoregional or metastatic disease.  - 9/12/18- Presented to ED  "with oliguria, CRESENCIO, and mild hydronephrosis bilaterally on CT scan and underwent bilateral ureteral stent placment  - 11/13/2018- Right robotic nephroureterectomy, excision of ana-caval mass and LN excision by Stuart King. Pathology showed \"Right nephroureterectomy: Invasive high grade urothelial carcinoma measuring 3.5 cm, located in renal pelvis and invading through renal parenchyma into perinephric fat. Urothelial carcinoma in-situ present. Margins free of tumor. Ana-caval mass: Metastatic urothelial carcinoma involving one lymph node with at least 1 cm tumor deposit. Pericaval Extranodal Extension present. Five additional benign pericaval lymph nodes. Pathologic stage: pT4N1 (1 of 6 lymph nodes).\"  - 12/11/18- PET/CT whole body demonstrated significant residual FDG avid disease. For example, \"there is an FDG avid ill-defined pericaval mass immediately medial to the surgical clips measuring approximately 2.0 x 1.4 cm, with max SUV measuring up to12.2, see series 4 image 21. Additional FDG avid retrocaval and interaortocaval lymphadenopathy are noted.There is a 1.2 cm nodule in the right hemipelvis posterior lateral tothe bladder with max SUV measuring 8.3, see series 4 image 77. The bladder is incompletely distended.\" No suspicious thoracic or bone uptake.  - 12/12/18- Started adjuvant chemotherapy (carbo+gem) per POUT trial. Cycle 2 - 1/2/19. Cycle 3 - 1/23/19. Cycle 4 - 02/13/19.  - 1/22/19 - CT C/A/P with contrast compared with prior PET/CT: \"1. New well-circumscribed soft tissue pulmonary nodules of the right lower lobe and left upper lobe. Findings could be infectious, inflammatory, or represent metastatic disease. Continued attention on follow-up recommended. Postoperative changes of right nephrectomy. No evidence for local recurrence in the present study.\"  - 3/1/2019- CT C/A/P with contrast - \"1. Bilateral pulmonary nodules measuring up to 4 mm in the right lower lobe, previously measuring 8 mm. " "No new or enlarging pulmonary nodules. 2. No convincing evidence for metastatic disease in the abdomen, pelvis and bones.\"  - 6/3/2019- CT C/A/P with contrast - \"1. Surgical changes of right nephrectomy without evidence of residual or recurrent disease on this noncontrast exam.  Consider contrast enhanced examination on follow-up. 2. No evidence of metastatic disease in the abdomen, or pelvis on noncontrast CT.  Scattered tiny pulmonary nodules in the chest are not significantly changed.  Previously described prominent right lower lobe pulmonary nodule (previously measuring up to 8 mm) is no longer visualized. 3. Stable bilateral pulmonary nodules measuring maximally 4 mm.\"  - 09/11/19: CT C/A/P without contrast - \"1. Stable exam without evidence convincing for new disease. 2. Stable pulmonary nodules. 3. 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 4. Stable heterogeneous enlargement of the thyroid with underlying nodules suggested.\"  - 12/4/19: CT C/A/P without contrast - \"No acute change since prior exam. No evidence of recurrent or metastatic disease. Tiny lung nodules are stable. Prior right nephrectomy. Left renal artery aneurysm is stable.\"  - 04/08/20, 7/27/20: CT C/A/P with contrast - GABRIELLE.    2. Non-muscle invasive bladder cancer:  - 10/3/19: Cystoscopy showed a small area of erythema on retroflexion, possibly pre-existing or due to retroflexion of the scope. Urine cytology from that time showed cells suspicious for malignancy.   - 1/13/20: Bladder biopsy showed high grade urothelial carcinoma in-situ  - 2/12/20-3/18/20: Intravesical BCG therapy  - 7/24/20 and last cystoscopy was on the same day. It was negative. However, urine cytology was positive for high-grade urothelial carcinoma.     REVIEW OF SYSTEMS: 14 point ROS negative other than the symptoms noted above in the HPI.    PAST MEDICAL AND SURGICAL HISTORY:   Past Medical History:   Diagnosis Date     Calculus of kidney      " Chemotherapy-induced neutropenia (H) 3/6/2019     Esophageal reflux      GERD (gastroesophageal reflux disease)      Hyperlipidemia LDL goal <130 5/9/2010     Malignant melanoma of skin of trunk, except scrotum (H)      Nonspecific abnormal finding     has living will 2004 -      Nontoxic multinodular goiter     no further eval /tx rec per pt     Osteopenia      Other psoriasis      Personal history of colonic polyps      PMR (polymyalgia rheumatica) (H)      Stress-induced cardiomyopathy      Undiagnosed cardiac murmurs      Unspecified constipation      Unspecified essential hypertension      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     Past Surgical History:   Procedure Laterality Date     BIOPSY       C NONSPECIFIC PROCEDURE  2005    colonoscopy polyp repeat 2010     COLONOSCOPY  2014     COMBINED CYSTOSCOPY, INSERT STENT URETER(S) Bilateral 9/12/2018    Procedure: COMBINED CYSTOSCOPY, INSERT STENT URETER(S);  Cystoscopy Bilateral ureteral Stent Placement.;  Surgeon: Justin Henry MD;  [facility]     COMBINED CYSTOSCOPY, RETROGRADES, URETEROSCOPY, INSERT STENT Bilateral 4/3/2018    Procedure: COMBINED CYSTOSCOPY, RETROGRADES, URETEROSCOPY, INSERT STENT;;  Surgeon: Stuart King MD;  [facility]     COMBINED CYSTOSCOPY, RETROGRADES, URETEROSCOPY, INSERT STENT Bilateral 9/10/2018    Procedure: COMBINED CYSTOSCOPY, RETROGRADES, URETEROSCOPY, INSERT STENT;  Cystoscopy, Bilateral Ureteroscopy, Bladder Biopsies, Retrogram Pyelograms, Ureteral Washings and brushings, cysview;  Surgeon: Stuart King MD;  [facility]     CYSTOSCOPY, BIOPSY BLADDER INSTILL OPTICAL AGENT N/A 4/3/2018    Procedure: CYSTOSCOPY, BIOPSY BLADDER INSTILL OPTICAL AGENT;  Cystoscopy, Blue Light Cystoscopy, Bladder Biopsies, Bilateral Selective ureteral washings for Cytology, Bilateral Retrograde Pyelograms, Bilateral Ureteroscopy;  Surgeon: Stuart King MD;  [facility]     CYSTOSCOPY,  BIOPSY BLADDER, COMBINED N/A 2/19/2018    Procedure: COMBINED CYSTOSCOPY, BIOPSY BLADDER;  Cystoscopy, Bladder Biopsy;  Surgeon: Kenna La MD;  [facility]: [facility]     CYSTOSCOPY, FULGURATE BLADDER TUMOR, COMBINED N/A 1/13/2020    Procedure: CYSTOSCOPY, WITH  bladder biopsies and fulguration;  Surgeon: Stuart King MD;  [facility]     CYSTOSCOPY, REMOVE STENT(S), COMBINED  11/13/2018    Procedure: Flexible Cystoscopy with Stent Removal;  Surgeon: Stuart King MD;  [facility]     DAVINCI LYMPHADENECTOMY N/A 11/13/2018    Procedure: Davinci Lymphadenectomy ;  Surgeon: Stuart King MD;  [facility]     DAVINCI NEPHROURETERECTOMY N/A 11/13/2018    Procedure: Right DaVinci Assisted Nephroureterectomy;  Surgeon: Stuart King MD;  [facility]     ENDOSCOPIC ULTRASOUND LOWER GASTROINTESTIONAL TRACT (GI) N/A 10/30/2015    Procedure: ENDOSCOPIC ULTRASOUND LOWER GASTROINTESTIONAL TRACT (GI);  Surgeon: Daniel Jean-Baptiste MD;  Location[facility]     EYE SURGERY  12/4/17     INSERT PORT VASCULAR ACCESS Right 12/19/2018    Procedure: Chest Port Placement - right;  Surgeon: Stuart Chavez PA-C;  [facility]     IR CHEST PORT PLACEMENT > 5 YRS OF AGE  12/19/2018     IR PORT REMOVAL RIGHT  6/26/2019     LAPAROSCOPIC CHOLECYSTECTOMY WITH CHOLANGIOGRAMS N/A 11/1/2015    Procedure: LAPAROSCOPIC CHOLECYSTECTOMY WITH CHOLANGIOGRAMS;  Surgeon: Tonie Warren MD;  [facility]     REMOVE PORT VASCULAR ACCESS Right 6/26/2019    Procedure: Right Port Removal;  Surgeon: Froilan Irizarry PA-C;  [facility]     SURGICAL HISTORY OF -   1996    malignant melanoma     SURGICAL HISTORY OF -   1968    thyroid nodule     SURGICAL HISTORY OF -       D & C     SOCIAL HISTORY:   Social History     Tobacco Use     Smoking status: Never Smoker     Smokeless tobacco: Never Used   Substance Use Topics     Alcohol use: No     Alcohol/week: 0.0  standard drinks     Drug use: No     FAMILY HISTORY:   Family History   Problem Relation Age of Onset     Cancer Father         dec - esophageal and laryngeal     Heart Disease Mother      Respiratory Mother         dec     Breast Cancer Daughter      Other Cancer Daughter      Thyroid Disease Daughter      Asthma Daughter      Hyperlipidemia Son      Diabetes Son      ALLERGIES:   Allergies   Allergen Reactions     Codeine      CURRENT MEDICATIONS:   Current Outpatient Medications:      acetaminophen (TYLENOL) 650 MG CR tablet, Take 1 tablet (650 mg) by mouth every 8 hours as needed for pain (Patient taking differently: Take 1,300 mg by mouth 2 times daily as needed for pain (PT last dose 1.10.2020) ), Disp: 100 tablet, Rfl: 0     alendronate (FOSAMAX) 70 MG tablet, TAKE 1 TABLET EVERY 7 DAYS AT LEAST 60 MINUTES BEFORE BREAKFAST AS DIRECTED., Disp: 12 tablet, Rfl: 3     aspirin 81 MG tablet, Take 81 mg by mouth every evening , Disp: , Rfl:      Calcium Citrate-Vitamin D (CALCIUM + D PO), Take 1 tablet by mouth daily , Disp: , Rfl:      cycloSPORINE (RESTASIS) 0.05 % ophthalmic emulsion, Place 1 drop into both eyes 2 times daily, Disp: , Rfl:      ferrous sulfate 325 (65 Fe) MG TBEC EC tablet, Take 325 mg by mouth every morning , Disp: , Rfl:      furosemide (LASIX) 20 MG tablet, Take 1 tablet (20 mg) by mouth every morning, Disp: 90 tablet, Rfl: 3     irbesartan (AVAPRO) 300 MG tablet, TAKE 1 TABLET EVERY DAY (Patient taking differently: Take 300 mg by mouth every morning TAKE 1 TABLET EVERY DAY), Disp: 90 tablet, Rfl: 3     levofloxacin (LEVAQUIN) 500 MG tablet, Take 1 tablet 6 hours post treatment and 1 tablet each AM following each treatment., Disp: 6 tablet, Rfl: 6     lovastatin (MEVACOR) 40 MG tablet, TAKE 1 TABLET AT BEDTIME (HYPERLIPIDEMIA LDL GOAL BELOW 130), Disp: 90 tablet, Rfl: 3     methimazole (TAPAZOLE) 5 MG tablet, Take 1 tablet (5 mg) by mouth daily, Disp: 90 tablet, Rfl: 3     nitroGLYcerin  (NITROSTAT) 0.4 MG sublingual tablet, For chest pain place 1 tablet under the tongue every 5 minutes for 3 doses. If symptoms persist 5 minutes after 1st dose call 911., Disp: 25 tablet, Rfl: 3     Omega-3 Fatty Acids (FISH OIL) 500 MG CAPS, Take 1 capsule by mouth daily , Disp: , Rfl:      omeprazole (PRILOSEC) 20 MG DR capsule, TAKE 1 CAPSULE EVERY DAY (Patient taking differently: Take 20 mg by mouth every morning TAKE 1 CAPSULE EVERY DAY), Disp: 90 capsule, Rfl: 3     Probiotic Product (PROBIOTIC ADVANCED PO), Take 1 capsule by mouth every morning , Disp: , Rfl:      propranolol ER (INDERAL LA) 60 MG 24 hr capsule, Take 1 capsule (60 mg) by mouth every morning, Disp: 90 capsule, Rfl: 1     rOPINIRole (REQUIP) 0.25 MG tablet, TAKE 1 TABLET IN THE AFTERNOON AND TAKE 2 TABLETS EVERY NIGHT, Disp: 270 tablet, Rfl: 1     sertraline (ZOLOFT) 100 MG tablet, TAKE 1 TABLET (100 MG) BY MOUTH EVERY MORNING, Disp: 90 tablet, Rfl: 2     traZODone (DESYREL) 50 MG tablet, TAKE 1/2 TABLET AT BEDTIME, Disp: 45 tablet, Rfl: 2    PHYSICAL EXAMINATION:  Virtual visit.  Appears healthy, well-nourished, NAD  MMM  No resp distress  MSK grossly normal    LABORATORY DATA:   Lab Test 08/10/20  1017 07/20/20  1828 04/08/20  1027 01/10/20  1357 12/04/19  1419   WBC 6.2 7.1 6.3 7.7 6.8   RBC 4.11 4.04 4.09 4.13 3.95   HGB 13.1 12.9 12.6 13.2 12.6   HCT 41.0 39.8 39.9 41.0 38.8    99 98 99 98   MCH 31.9 31.9 30.8 32.0 31.9   MCHC 32.0 32.4 31.6 32.2 32.5   RDW 12.9 12.6 12.8 12.6 12.4    224 202 263 210   NEUTROPHIL  --  64.3 69.2  --  61.2    134  --  135 135   POTASSIUM 4.2 4.8  --  4.2 4.7   CHLORIDE 105 101  --  101 103   CO2 28 30  --  31 29   ANIONGAP 5 3  --  3 3   GLC 99 90  --  83 110*   BUN 19 25  --  34* 28   CR 1.05* 0.98  --  1.19* 1.09*   SHREYA 9.2 8.8  --  9.3 9.1   PROTTOTAL 7.6 7.6  --  7.8 7.3   ALBUMIN 3.4 3.7  --  3.6 3.5   BILITOTAL 0.5 0.5  --  0.4 0.4   ALKPHOS 91 90  --  112 104   AST 16 30  --  15 14    ALT   --  26      IMAGING STUDIES:  As above.     ASSESSMENT AND PLAN: Ms. Oviedo is a delightful 86-year-old lady with localized stage IV ([id number]) high-grade, muscle-invasive transitional cell carcinoma of right renal pelvis, status post right robotic nephroureterectomy and 4 cycles of adjuvant carbo/gem.    1. Upper tract urothelial cancer:   - She completed 4 cycles of adjuvant carboplatin plus gemcitabine chemotherapy per POUT trial. No residual side effects or evidence of recurrence.  - Reviewed restaging CT scan from 20 that showed no clear evidence of disease recurrence.   - She did have a high-grade CIS in Dec 2019 cysto which was treated by Dr. King as above. Concern that this is a drop met, but no evidence of upper tract issues on recent CT.  - Continue active surveillance. She's aware that this involves close monitoring for new or worsening signs or symptoms such as shortness of breath, chest pain, abdominal pain, unexplained weight loss, hematuria etc.  - Next restaging CT C/A/P with contrast in 4 months.     2. High grade urothelial carcinoma in situ:  - Bx proven carcinoma in situ in 2020, completed the first round of intravesical BCG treatment 2020-3/2020.  - Urine cytology positive again in end 2020. Have sent a message to Dr. Henry to facilitate prompt follow-up and perhaps consider BCG reinduction v mitogel v nivo+TRINI clinical trial (PI: Joseph Dunn MD).  Further mgmt per him.    3. Chemo induced anemia and thrombocytopenia:  - Resolved.     Return to clinic in 4 months with restaging CT C/A/P.    All of the above was explained to the patient in lay language and several questions were answered. They are in agreement with the plan.    BILLIN - video visit duration - 30 mins    Jaden Toledo M.D.  . Professor of Medicine  Genitourinary Oncology  Division of Hematology, Oncology & Transplantation  [facility]

## 2020-08-11 NOTE — PROGRESS NOTES
"Dimple Oviedo is a 87 year old female who is being evaluated via a billable video visit.      The patient has been notified of following:     \"This video visit will be conducted via a call between you and your physician/provider. We have found that certain health care needs can be provided without the need for an in-person physical exam.  This service lets us provide the care you need with a video conversation.  If a prescription is necessary we can send it directly to your pharmacy.  If lab work is needed we can place an order for that and you can then stop by our lab to have the test done at a later time.    Video visits are billed at different rates depending on your insurance coverage.  Please reach out to your insurance provider with any questions.    If during the course of the call the physician/provider feels a video visit is not appropriate, you will not be charged for this service.\"    Patient has given verbal consent for Video visit? Yes    How would you like to obtain your AVS? MyChart     If you are dropped from the video visit, the video invite should be resent to: Send to e-mail at: [email or web address]     Will anyone else be joining your video visit? EMAIL INVITE TO DAUGHTER:  [email or web address]         I have reviewed and updated the patient's allergies and medication list. Patient was asked to provide any patient recorded vital signs, height and/or weight.  Please see \"Patient Reported Vital Signs\" tab for that information.      Concerns: Patient has no new concerns.    Refills: None     SAIMA Platt    Video-Visit Details  Type of service:  Video Visit  Video duration: 30 mins  Originating Location (pt. Location): Home  Distant Location (provider location):  [facility]   Platform used for Video Visit: Tad Toledo MD        "

## 2020-08-11 NOTE — LETTER
"    8/11/2020         RE: Dimple Oviedo  [address]  [facility] [address]        Dear Colleague,    Thank you for referring your patient, Dimple Oviedo, to the [facility]. Please see a copy of my visit note below.    Dimple Oviedo is a 87 year old female who is being evaluated via a billable video visit.        I have reviewed and updated the patient's allergies and medication list. Patient was asked to provide any patient recorded vital signs, height and/or weight.  Please see \"Patient Reported Vital Signs\" tab for that information.      Concerns: Patient has no new concerns.    Refills: None     SAIMA Platt    Video-Visit Details  Type of service:  Video Visit  Video duration: 30 mins  Originating Location (pt. 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    HISTORY OF PRESENT ILLNESS:  Ms. 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 Her oncologic history is as under.    Ms. Oviedo is doing well overall. Unable to play cards due to COVID-19, but trying to stay active. The only issue is L knee pain due to OA. She denies fever, chills, shortness of breath or chest pain. There is no clinical evidence of disease progression.    She follows with Dr. Henry in urology on 7/24/20 and last cystoscopy was on the same day. It was negative. However, urine cytology was positive for high-grade urothelial carcinoma. Of note, bladder biopsy from 01/13/2020 showed urothelial carcinoma in-situ. She proceeded with intravesical BCG therapy x6 (2/12-3/18). " "     ONCOLOGIC HISTORY:  1. 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   - Jan 2018 to September 2018- Persistent gross hematuria and underwent multiple procedures.    - 2/19/18 and 4/3/18- cystoscopies with bladder biopsies  which were negative for bladder tumor  - 1/17/18, 3/8/18, 7/26/18, 9/10/18- Multiple urine cytologies have come back positive by FISH for high-grade transitional cell carcinoma  - 9/10/18- Underwent a bilateral cystoureteroscopy with biopsy and brushing that showed  right upper tract cytology suspicious for high-grade urothelial ca. Right ureter brushing negative from that day. Left upper tract negative.  - 9/10/18- CT A/P without contrast showed new small bilateral pleural effusions and interstitial pulmonary edema but no evidence of locoregional or metastatic disease.  - 9/12/18- Presented to ED with oliguria, CRESENCIO, and mild hydronephrosis bilaterally on CT scan and underwent bilateral ureteral stent placment  - 11/13/2018- Right robotic nephroureterectomy, excision of ana-caval mass and LN excision by Stuart King. Pathology showed \"Right nephroureterectomy: Invasive high grade urothelial carcinoma measuring 3.5 cm, located in renal pelvis and invading through renal parenchyma into perinephric fat. Urothelial carcinoma in-situ present. Margins free of tumor. Ana-caval mass: Metastatic urothelial carcinoma involving one lymph node with at least 1 cm tumor deposit. Pericaval Extranodal Extension present. Five additional benign pericaval lymph nodes. Pathologic stage: pT4N1 (1 of 6 lymph nodes).\"  - 12/11/18- PET/CT whole body demonstrated significant residual FDG avid disease. For example, \"there is an FDG avid ill-defined pericaval mass immediately medial to the surgical clips measuring approximately 2.0 x 1.4 cm, with max SUV measuring up to12.2, see series 4 image 21. Additional FDG " "avid retrocaval and interaortocaval lymphadenopathy are noted.There is a 1.2 cm nodule in the right hemipelvis posterior lateral tothe bladder with max SUV measuring 8.3, see series 4 image 77. The bladder is incompletely distended.\" No suspicious thoracic or bone uptake.  - 12/12/18- Started adjuvant chemotherapy (carbo+gem) per POUT trial. Cycle 2 - 1/2/19. Cycle 3 - 1/23/19. Cycle 4 - 02/13/19.  - 1/22/19 - CT C/A/P with contrast compared with prior PET/CT: \"1. New well-circumscribed soft tissue pulmonary nodules of the right lower lobe and left upper lobe. Findings could be infectious, inflammatory, or represent metastatic disease. Continued attention on follow-up recommended. Postoperative changes of right nephrectomy. No evidence for local recurrence in the present study.\"  - 3/1/2019- CT C/A/P with contrast - \"1. Bilateral pulmonary nodules measuring up to 4 mm in the right lower lobe, previously measuring 8 mm. No new or enlarging pulmonary nodules. 2. No convincing evidence for metastatic disease in the abdomen, pelvis and bones.\"  - 6/3/2019- CT C/A/P with contrast - \"1. Surgical changes of right nephrectomy without evidence of residual or recurrent disease on this noncontrast exam.  Consider contrast enhanced examination on follow-up. 2. No evidence of metastatic disease in the abdomen, or pelvis on noncontrast CT.  Scattered tiny pulmonary nodules in the chest are not significantly changed.  Previously described prominent right lower lobe pulmonary nodule (previously measuring up to 8 mm) is no longer visualized. 3. Stable bilateral pulmonary nodules measuring maximally 4 mm.\"  - 09/11/19: CT C/A/P without contrast - \"1. Stable exam without evidence convincing for new disease. 2. Stable pulmonary nodules. 3. 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 4. Stable heterogeneous enlargement of the thyroid with underlying nodules suggested.\"  - 12/4/19: CT C/A/P without contrast - \"No acute " "change since prior exam. No evidence of recurrent or metastatic disease. Tiny lung nodules are stable. Prior right nephrectomy. Left renal artery aneurysm is stable.\"  - 04/08/20, 7/27/20: CT C/A/P with contrast - GABRIELLE.    2. Non-muscle invasive bladder cancer:  - 10/3/19: Cystoscopy showed a small area of erythema on retroflexion, possibly pre-existing or due to retroflexion of the scope. Urine cytology from that time showed cells suspicious for malignancy.   - 1/13/20: Bladder biopsy showed high grade urothelial carcinoma in-situ  - 2/12/20-3/18/20: Intravesical BCG therapy  - 7/24/20 and last cystoscopy was on the same day. It was negative. However, urine cytology was positive for high-grade urothelial carcinoma.     REVIEW OF SYSTEMS: 14 point ROS negative other than the symptoms noted above in the HPI.    PAST MEDICAL AND SURGICAL HISTORY:   Past Medical History:   Diagnosis Date     Calculus of kidney      Chemotherapy-induced neutropenia (H) 3/6/2019     Esophageal reflux      GERD (gastroesophageal reflux disease)      Hyperlipidemia LDL goal <130 5/9/2010     Malignant melanoma of skin of trunk, except scrotum (H)      Nonspecific abnormal finding     has living will 2004 -      Nontoxic multinodular goiter     no further eval /tx rec per pt     Osteopenia      Other psoriasis      Personal history of colonic polyps      PMR (polymyalgia rheumatica) (H)      Stress-induced cardiomyopathy      Undiagnosed cardiac murmurs      Unspecified constipation      Unspecified essential hypertension      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     Past Surgical History:   Procedure Laterality Date     BIOPSY       C NONSPECIFIC PROCEDURE  2005    colonoscopy polyp repeat 2010     COLONOSCOPY  2014     COMBINED CYSTOSCOPY, INSERT STENT URETER(S) Bilateral 9/12/2018    Procedure: COMBINED CYSTOSCOPY, INSERT STENT URETER(S);  Cystoscopy Bilateral ureteral Stent Placement.;  Surgeon: Justin Henry MD;  Location:  OR "     COMBINED CYSTOSCOPY, RETROGRADES, URETEROSCOPY, INSERT STENT Bilateral 4/3/2018    Procedure: COMBINED CYSTOSCOPY, RETROGRADES, URETEROSCOPY, INSERT STENT;;  Surgeon: Stuart King MD;  [facility]     COMBINED CYSTOSCOPY, RETROGRADES, URETEROSCOPY, INSERT STENT Bilateral 9/10/2018    Procedure: COMBINED CYSTOSCOPY, RETROGRADES, URETEROSCOPY, INSERT STENT;  Cystoscopy, Bilateral Ureteroscopy, Bladder Biopsies, Retrogram Pyelograms, Ureteral Washings and brushings, cysview;  Surgeon: Stuart King MD;  [facility]     CYSTOSCOPY, BIOPSY BLADDER INSTILL OPTICAL AGENT N/A 4/3/2018    Procedure: CYSTOSCOPY, BIOPSY BLADDER INSTILL OPTICAL AGENT;  Cystoscopy, Blue Light Cystoscopy, Bladder Biopsies, Bilateral Selective ureteral washings for Cytology, Bilateral Retrograde Pyelograms, Bilateral Ureteroscopy;  Surgeon: Stuart King MD;  [facility]     CYSTOSCOPY, BIOPSY BLADDER, COMBINED N/A 2/19/2018    Procedure: COMBINED CYSTOSCOPY, BIOPSY BLADDER;  Cystoscopy, Bladder Biopsy;  Surgeon: Kenna La MD;  Location: [facility] OR     CYSTOSCOPY, FULGURATE BLADDER TUMOR, COMBINED N/A 1/13/2020    Procedure: CYSTOSCOPY, WITH  bladder biopsies and fulguration;  Surgeon: Stuart King MD;  [facility]     CYSTOSCOPY, REMOVE STENT(S), COMBINED  11/13/2018    Procedure: Flexible Cystoscopy with Stent Removal;  Surgeon: Stuart King MD;  [facility] OR     DAVINCI LYMPHADENECTOMY N/A 11/13/2018    Procedure: Davinci Lymphadenectomy ;  Surgeon: Stuart King MD;  [facility]     DAVINCI NEPHROURETERECTOMY N/A 11/13/2018    Procedure: Right DaVinci Assisted Nephroureterectomy;  Surgeon: Stuart King MD;  [facility]     ENDOSCOPIC ULTRASOUND LOWER GASTROINTESTIONAL TRACT (GI) N/A 10/30/2015    Procedure: ENDOSCOPIC ULTRASOUND LOWER GASTROINTESTIONAL TRACT (GI);  Surgeon: Daniel Jean-Baptiste MD;  [facility] OR      EYE SURGERY  12/4/17     INSERT PORT VASCULAR ACCESS Right 12/19/2018    Procedure: Chest Port Placement - right;  Surgeon: Stuart Chavez PA-C;  [facility]     IR CHEST PORT PLACEMENT > 5 YRS OF AGE  12/19/2018     IR PORT REMOVAL RIGHT  6/26/2019     LAPAROSCOPIC CHOLECYSTECTOMY WITH CHOLANGIOGRAMS N/A 11/1/2015    Procedure: LAPAROSCOPIC CHOLECYSTECTOMY WITH CHOLANGIOGRAMS;  Surgeon: Tonie Warren MD;  [facility]     REMOVE PORT VASCULAR ACCESS Right 6/26/2019    Procedure: Right Port Removal;  Surgeon: Froilan Irizarry PA-C;  [facility]     SURGICAL HISTORY OF -   1996    malignant melanoma     SURGICAL HISTORY OF -   1968    thyroid nodule     SURGICAL HISTORY OF -       D & C     SOCIAL HISTORY:   Social History     Tobacco Use     Smoking status: Never Smoker     Smokeless tobacco: Never Used   Substance Use Topics     Alcohol use: No     Alcohol/week: 0.0 standard drinks     Drug use: No     FAMILY HISTORY:   Family History   Problem Relation Age of Onset     Cancer Father         dec - esophageal and laryngeal     Heart Disease Mother      Respiratory Mother         dec     Breast Cancer Daughter      Other Cancer Daughter      Thyroid Disease Daughter      Asthma Daughter      Hyperlipidemia Son      Diabetes Son      ALLERGIES:   Allergies   Allergen Reactions     Codeine      CURRENT MEDICATIONS:   Current Outpatient Medications:      acetaminophen (TYLENOL) 650 MG CR tablet, Take 1 tablet (650 mg) by mouth every 8 hours as needed for pain (Patient taking differently: Take 1,300 mg by mouth 2 times daily as needed for pain (PT last dose 1.10.2020) ), Disp: 100 tablet, Rfl: 0     alendronate (FOSAMAX) 70 MG tablet, TAKE 1 TABLET EVERY 7 DAYS AT LEAST 60 MINUTES BEFORE BREAKFAST AS DIRECTED., Disp: 12 tablet, Rfl: 3     aspirin 81 MG tablet, Take 81 mg by mouth every evening , Disp: , Rfl:      Calcium Citrate-Vitamin D (CALCIUM + D PO), Take 1 tablet by mouth  daily , Disp: , Rfl:      cycloSPORINE (RESTASIS) 0.05 % ophthalmic emulsion, Place 1 drop into both eyes 2 times daily, Disp: , Rfl:      ferrous sulfate 325 (65 Fe) MG TBEC EC tablet, Take 325 mg by mouth every morning , Disp: , Rfl:      furosemide (LASIX) 20 MG tablet, Take 1 tablet (20 mg) by mouth every morning, Disp: 90 tablet, Rfl: 3     irbesartan (AVAPRO) 300 MG tablet, TAKE 1 TABLET EVERY DAY (Patient taking differently: Take 300 mg by mouth every morning TAKE 1 TABLET EVERY DAY), Disp: 90 tablet, Rfl: 3     levofloxacin (LEVAQUIN) 500 MG tablet, Take 1 tablet 6 hours post treatment and 1 tablet each AM following each treatment., Disp: 6 tablet, Rfl: 6     lovastatin (MEVACOR) 40 MG tablet, TAKE 1 TABLET AT BEDTIME (HYPERLIPIDEMIA LDL GOAL BELOW 130), Disp: 90 tablet, Rfl: 3     methimazole (TAPAZOLE) 5 MG tablet, Take 1 tablet (5 mg) by mouth daily, Disp: 90 tablet, Rfl: 3     nitroGLYcerin (NITROSTAT) 0.4 MG sublingual tablet, For chest pain place 1 tablet under the tongue every 5 minutes for 3 doses. If symptoms persist 5 minutes after 1st dose call 911., Disp: 25 tablet, Rfl: 3     Omega-3 Fatty Acids (FISH OIL) 500 MG CAPS, Take 1 capsule by mouth daily , Disp: , Rfl:      omeprazole (PRILOSEC) 20 MG DR capsule, TAKE 1 CAPSULE EVERY DAY (Patient taking differently: Take 20 mg by mouth every morning TAKE 1 CAPSULE EVERY DAY), Disp: 90 capsule, Rfl: 3     Probiotic Product (PROBIOTIC ADVANCED PO), Take 1 capsule by mouth every morning , Disp: , Rfl:      propranolol ER (INDERAL LA) 60 MG 24 hr capsule, Take 1 capsule (60 mg) by mouth every morning, Disp: 90 capsule, Rfl: 1     rOPINIRole (REQUIP) 0.25 MG tablet, TAKE 1 TABLET IN THE AFTERNOON AND TAKE 2 TABLETS EVERY NIGHT, Disp: 270 tablet, Rfl: 1     sertraline (ZOLOFT) 100 MG tablet, TAKE 1 TABLET (100 MG) BY MOUTH EVERY MORNING, Disp: 90 tablet, Rfl: 2     traZODone (DESYREL) 50 MG tablet, TAKE 1/2 TABLET AT BEDTIME, Disp: 45 tablet, Rfl:  2    PHYSICAL EXAMINATION:  Virtual visit.  Appears healthy, well-nourished, NAD  MMM  No resp distress  MSK grossly normal    LABORATORY DATA:   Lab Test 08/10/20  1017 07/20/20  1828 04/08/20  1027 01/10/20  1357 12/04/19  1419   WBC 6.2 7.1 6.3 7.7 6.8   RBC 4.11 4.04 4.09 4.13 3.95   HGB 13.1 12.9 12.6 13.2 12.6   HCT 41.0 39.8 39.9 41.0 38.8    99 98 99 98   MCH 31.9 31.9 30.8 32.0 31.9   MCHC 32.0 32.4 31.6 32.2 32.5   RDW 12.9 12.6 12.8 12.6 12.4    224 202 263 210   NEUTROPHIL  --  64.3 69.2  --  61.2    134  --  135 135   POTASSIUM 4.2 4.8  --  4.2 4.7   CHLORIDE 105 101  --  101 103   CO2 28 30  --  31 29   ANIONGAP 5 3  --  3 3   GLC 99 90  --  83 110*   BUN 19 25  --  34* 28   CR 1.05* 0.98  --  1.19* 1.09*   SHREYA 9.2 8.8  --  9.3 9.1   PROTTOTAL 7.6 7.6  --  7.8 7.3   ALBUMIN 3.4 3.7  --  3.6 3.5   BILITOTAL 0.5 0.5  --  0.4 0.4   ALKPHOS 91 90  --  112 104   AST 16 30  --  15 14   ALT 21 21  --  26 21     IMAGING STUDIES:  As above.     ASSESSMENT AND PLAN: Ms. Oviedo is a delightful 86-year-old lady with localized stage IV ([id number]) high-grade, muscle-invasive transitional cell carcinoma of right renal pelvis, status post right robotic nephroureterectomy and 4 cycles of adjuvant carbo/gem.    1. Upper tract urothelial cancer:   - She completed 4 cycles of adjuvant carboplatin plus gemcitabine chemotherapy per POUT trial. No residual side effects or evidence of recurrence.  - Reviewed restaging CT scan from 07/27/20 that showed no clear evidence of disease recurrence.   - She did have a high-grade CIS in Dec 2019 cysto which was treated by Dr. King as above. Concern that this is a drop met, but no evidence of upper tract issues on recent CT.  - Continue active surveillance. She's aware that this involves close monitoring for new or worsening signs or symptoms such as shortness of breath, chest pain, abdominal pain, unexplained weight loss, hematuria etc.  - Next restaging CT C/A/P  with contrast in 4 months.     2. High grade urothelial carcinoma in situ:  - Bx proven carcinoma in situ in 2020, completed the first round of intravesical BCG treatment 2020-3/2020.  - Urine cytology positive again in end 2020. Have sent a message to Dr. Henry to facilitate prompt follow-up and perhaps consider BCG reinduction v mitogel v nivo+TRINI clinical trial (PI: Joseph Dunn MD).  Further mgmt per him.    3. Chemo induced anemia and thrombocytopenia:  - Resolved.     Return to clinic in 4 months with restaging CT C/A/P.    All of the above was explained to the patient in lay language and several questions were answered. They are in agreement with the plan.    BILLIN - video visit duration - 30 mins    Jaden Toledo M.D.  . Professor of Medicine  Genitourinary Oncology  Division of Hematology, Oncology & Transplantation  [facility]

## 2020-08-15 DIAGNOSIS — E78.5 HYPERLIPIDEMIA LDL GOAL <130: ICD-10-CM

## 2020-08-17 ENCOUNTER — TELEPHONE (OUTPATIENT)
Dept: UROLOGY | Facility: CLINIC | Age: 85
End: 2020-08-17

## 2020-08-17 NOTE — TELEPHONE ENCOUNTER
[facility] Call Center    Phone Message    May a detailed message be left on voicemail: yes     Reason for Call: Other: Cullen calling to request a call back from the care team. Cullen says that she was told after her 7/24/20 appointment with Dr. Henry that someone would be getting back to her about when her next bladder biopsy would be scheduled. Dimple would like to get that all arranged. Please give Dimple a call back at your earliest convenience to discuss.     Action Taken: Message routed to:  [facility] ([facility]  [facility]    Travel Screening: Not Applicable

## 2020-08-18 RX ORDER — LOVASTATIN 40 MG
TABLET ORAL
Qty: 90 TABLET | Refills: 0 | Status: SHIPPED | OUTPATIENT
Start: 2020-08-18 | End: 2020-11-04

## 2020-08-19 ENCOUNTER — TELEPHONE (OUTPATIENT)
Dept: UROLOGY | Facility: CLINIC | Age: 85
End: 2020-08-19

## 2020-08-19 ENCOUNTER — HOSPITAL ENCOUNTER (OUTPATIENT)
Facility: AMBULATORY SURGERY CENTER | Age: 85
End: 2020-08-19
Attending: UROLOGY
Payer: MEDICARE

## 2020-08-19 DIAGNOSIS — Z11.59 ENCOUNTER FOR SCREENING FOR OTHER VIRAL DISEASES: Primary | ICD-10-CM

## 2020-08-19 DIAGNOSIS — E04.2 NONTOXIC MULTINODULAR GOITER: ICD-10-CM

## 2020-08-19 DIAGNOSIS — C67.8 MALIGNANT NEOPLASM OF OVERLAPPING SITES OF BLADDER (H): ICD-10-CM

## 2020-08-19 NOTE — TELEPHONE ENCOUNTER
Patient is scheduled for surgery with Dr. Henry      Spoke with Dimple    Date of Surgery: 8/27/20    [facility] OR    Informed patient they will need an adult  yes    H&P: Scheduled with PAC 8/24/20    Additional imaging/appointments: n/a    Surgery packet: to be sent via opt [employer] 8/20/20     Additional comments: n/a  
normal (ped)...

## 2020-08-20 ENCOUNTER — PATIENT OUTREACH (OUTPATIENT)
Dept: UROLOGY | Facility: CLINIC | Age: 85
End: 2020-08-20

## 2020-08-20 DIAGNOSIS — C67.8 MALIGNANT NEOPLASM OF OVERLAPPING SITES OF BLADDER (H): Primary | ICD-10-CM

## 2020-08-21 RX ORDER — PROPRANOLOL HCL 60 MG
CAPSULE, EXTENDED RELEASE 24HR ORAL
Qty: 90 CAPSULE | Refills: 1 | Status: SHIPPED | OUTPATIENT
Start: 2020-08-21 | End: 2021-01-18

## 2020-08-21 NOTE — TELEPHONE ENCOUNTER
Routing refill request to provider for review/approval because:  BP Readings from Last 3 Encounters:   07/20/20 (!) 167/78   06/03/20 (!) 151/75   05/27/20 (!) (P) 166/81

## 2020-08-24 ENCOUNTER — OFFICE VISIT (OUTPATIENT)
Dept: SURGERY | Facility: CLINIC | Age: 85
End: 2020-08-24
Payer: MEDICARE

## 2020-08-24 ENCOUNTER — PRE VISIT (OUTPATIENT)
Dept: SURGERY | Facility: CLINIC | Age: 85
End: 2020-08-24

## 2020-08-24 ENCOUNTER — ANESTHESIA EVENT (OUTPATIENT)
Dept: SURGERY | Facility: CLINIC | Age: 85
End: 2020-08-24
Payer: MEDICARE

## 2020-08-24 VITALS
BODY MASS INDEX: 35.6 KG/M2 | SYSTOLIC BLOOD PRESSURE: 137 MMHG | TEMPERATURE: 98 F | RESPIRATION RATE: 16 BRPM | HEIGHT: 57 IN | OXYGEN SATURATION: 95 % | HEART RATE: 61 BPM | WEIGHT: 165 LBS | DIASTOLIC BLOOD PRESSURE: 78 MMHG

## 2020-08-24 DIAGNOSIS — C67.9 MALIGNANT NEOPLASM OF URINARY BLADDER, UNSPECIFIED SITE (H): ICD-10-CM

## 2020-08-24 DIAGNOSIS — C67.8 MALIGNANT NEOPLASM OF OVERLAPPING SITES OF BLADDER (H): ICD-10-CM

## 2020-08-24 DIAGNOSIS — Z11.59 ENCOUNTER FOR SCREENING FOR OTHER VIRAL DISEASES: ICD-10-CM

## 2020-08-24 DIAGNOSIS — Z01.818 PRE-OP EVALUATION: Primary | ICD-10-CM

## 2020-08-24 LAB
ALBUMIN UR-MCNC: NEGATIVE MG/DL
APPEARANCE UR: CLEAR
BILIRUB UR QL STRIP: NEGATIVE
COLOR UR AUTO: NORMAL
GLUCOSE UR STRIP-MCNC: NEGATIVE MG/DL
HGB UR QL STRIP: NEGATIVE
KETONES UR STRIP-MCNC: NEGATIVE MG/DL
LEUKOCYTE ESTERASE UR QL STRIP: NEGATIVE
NITRATE UR QL: NEGATIVE
PH UR STRIP: 6 PH (ref 5–7)
RBC #/AREA URNS AUTO: 1 /HPF (ref 0–2)
SOURCE: NORMAL
SP GR UR STRIP: 1.01 (ref 1–1.03)
UROBILINOGEN UR STRIP-MCNC: 0 MG/DL (ref 0–2)
WBC #/AREA URNS AUTO: 1 /HPF (ref 0–5)

## 2020-08-24 PROCEDURE — U0003 INFECTIOUS AGENT DETECTION BY NUCLEIC ACID (DNA OR RNA); SEVERE ACUTE RESPIRATORY SYNDROME CORONAVIRUS 2 (SARS-COV-2) (CORONAVIRUS DISEASE [COVID-19]), AMPLIFIED PROBE TECHNIQUE, MAKING USE OF HIGH THROUGHPUT TECHNOLOGIES AS DESCRIBED BY CMS-2020-01-R: HCPCS | Performed by: UROLOGY

## 2020-08-24 ASSESSMENT — PAIN SCALES - GENERAL: PAINLEVEL: NO PAIN (0)

## 2020-08-24 ASSESSMENT — ENCOUNTER SYMPTOMS
SEIZURES: 0
DYSRHYTHMIAS: 1

## 2020-08-24 ASSESSMENT — MIFFLIN-ST. JEOR: SCORE: 1057.32

## 2020-08-24 ASSESSMENT — LIFESTYLE VARIABLES: TOBACCO_USE: 0

## 2020-08-24 NOTE — PATIENT INSTRUCTIONS
Preparing for Your Surgery      Name:  Dimple Oviedo   MRN:  [id number]   :  1933   Today's Date:  2020         Arriving for surgery:  Surgery date:  20  Arrival time:  5:45 am    Restrictions due to COVID 19:  No visitors at the Surgery Center   parking is not available     Please come to:    [facility]  [address] [place] [id number]    Please check in on the 5th floor at the Ambulatory Surgery Center       What can I eat or drink?    -  You may eat and drink normally until 8 hours before surgery. (Until 11:15 pm)  -  You may have clear liquids up to 4 hours before surgery. (Until 3:15 am)    Examples of clear liquids:  Water  Clear broth  Juices (apple, white grape, white cranberry  and cider) without pulp  Noncarbonated, powder based beverages  (lemonade and Gorge-Aid)  Sodas (Sprite, 7-Up, ginger ale and seltzer)  Coffee or tea (without milk or cream)  Gatorade    --No alcohol for at least 24 hours before surgery    Which medicines can I take?    Hold multivitamins for 7 days before surgery.  Hold supplements for 7 days before surgery.  Hold Ibuprofen (Advil, Motrin) for 1 day before surgery--unless otherwise directed by surgeon.  Hold Naproxen (Aleve) for 4 days before surgery.    -  DO NOT take the following medications the day of surgery:      -  PLEASE TAKE the following medications the day of surgery:  Acetaminophen (Tylenol)  Cyclosporine (Restasis) eye drops  Levofloxacin (Levaquin)  Lovastatin (Mevacor)  Methimazole (Tapazole)  Omeprazole (Prilosec)  Propranolol (Inderal)  Sertraline (Zoloft)  Aspirin        How do I prepare myself?  - Please take 2 showers before surgery using Scrubcare or Hibiclens soap.    Use this soap only from the neck to your toes.     Leave the soap on your skin for one minute--then rinse thoroughly.      You may use your own shampoo and conditioner; no other hair products.   - Please remove all jewelry and body  piercings.  - No lotions, deodorants or fragrance.  - No makeup or fingernail polish.   - Bring your ID and insurance card.        - All patients are required to have a Covid-19 test within 4 days of surgery/procedure.      -Patients will be contacted by the [facility] scheduling team within 1 week of surgery to make an appointment.      - Patients may call the Scheduling team at [phone] if they have not been scheduled within 4 days of  surgery.      ALL PATIENTS ARE REQUIRED TO HAVE A RESPONSIBLE ADULT TO DRIVE AND BE IN ATTENDANCE WITH THEM FOR 24 HOURS FOLLOWING SURGERY       Questions or Concerns:    -For questions regarding the day of surgery please contact the Ambulatory Surgery Center at [phone].    -If you have health changes between today and your surgery please contact your surgeon.     For questions after surgery please call your surgeons office.

## 2020-08-24 NOTE — ANESTHESIA PREPROCEDURE EVALUATION
Anesthesia Pre-Procedure Evaluation    Patient: Dimple Oviedo   MRN:     [id number] Gender:   female   Age:    87 year old :      1933        Preoperative Diagnosis: Malignant neoplasm of overlapping sites of bladder (H) [C67.8]   Procedure(s):  CYSTOSCOPY, WITH BLADDER BIOPSY, left ureteral wash, retrograde, possible ureteroscopy     LABS:  CBC:   Lab Results   Component Value Date    WBC 6.2 08/10/2020    WBC 7.1 2020    HGB 13.1 08/10/2020    HGB 12.9 2020    HCT 41.0 08/10/2020    HCT 39.8 2020     08/10/2020     2020     BMP:   Lab Results   Component Value Date     08/10/2020     2020    POTASSIUM 4.2 08/10/2020    POTASSIUM 4.8 2020    CHLORIDE 105 08/10/2020    CHLORIDE 101 2020    CO2 28 08/10/2020    CO2 30 2020    BUN 19 08/10/2020    BUN 25 2020    CR 1.05 (H) 08/10/2020    CR 0.98 2020    GLC 99 08/10/2020    GLC 90 2020     COAGS:   Lab Results   Component Value Date    PTT 27 2020    INR 0.95 2020     POC:   Lab Results   Component Value Date    BGM 99 2018    HCGS Negative 2017     OTHER:   Lab Results   Component Value Date    LACT 1.0 2017    A1C 6.2 (H) 2017    SHREYA 9.2 08/10/2020    PHOS 2.4 (L) 2017    MAG 1.8 2019    ALBUMIN 3.4 08/10/2020    PROTTOTAL 7.6 08/10/2020    ALT 21 08/10/2020    AST 16 08/10/2020    ALKPHOS 91 08/10/2020    BILITOTAL 0.5 08/10/2020    LIPASE 91 2018    AMYLASE 44 10/29/2015    TSH 3.39 2020    T4 0.73 (L) 2020    T3 73 2020    CRP <2.9 10/06/2017    SED 25 10/06/2017        Preop Vitals    BP Readings from Last 3 Encounters:   20 (!) 167/78   20 (!) 151/75   20 (!) (P) 166/81    Pulse Readings from Last 3 Encounters:   20 59   20 53   20 (P) 57      Resp Readings from Last 3 Encounters:   20 18   20 16   20 (P) 16    SpO2 Readings from Last 3  "Encounters:   07/20/20 96%   06/03/20 97%   05/27/20 (P) 96%      Temp Readings from Last 1 Encounters:   07/20/20 97.9  F (36.6  C) (Oral)    Ht Readings from Last 1 Encounters:   07/24/20 1.448 m (4' 9\")      Wt Readings from Last 1 Encounters:   07/24/20 72.6 kg (160 lb)    Estimated body mass index is 34.62 kg/m  as calculated from the following:    Height as of 7/24/20: 1.448 m (4' 9\").    Weight as of 7/24/20: 72.6 kg (160 lb).     LDA:  Urethral Catheter Double-lumen;Latex;Straight-tip;Silver coated 16 fr (Active)   Number of days: 650        Past Medical History:   Diagnosis Date     Calculus of kidney      Chemotherapy-induced neutropenia (H) 3/6/2019     Esophageal reflux      GERD (gastroesophageal reflux disease)      Hyperlipidemia LDL goal <130 5/9/2010     Malignant melanoma of skin of trunk, except scrotum (H)      Nonspecific abnormal finding     has living will 2004 -      Nontoxic multinodular goiter     no further eval /tx rec per pt     Osteopenia      Other psoriasis      Personal history of colonic polyps      PMR (polymyalgia rheumatica) (H)      Stress-induced cardiomyopathy      Undiagnosed cardiac murmurs      Unspecified constipation      Unspecified essential hypertension      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      Past Surgical History:   Procedure Laterality Date     BIOPSY       C NONSPECIFIC PROCEDURE  2005    colonoscopy polyp repeat 2010     COLONOSCOPY  2014     COMBINED CYSTOSCOPY, INSERT STENT URETER(S) Bilateral 9/12/2018    Procedure: COMBINED CYSTOSCOPY, INSERT STENT URETER(S);  Cystoscopy Bilateral ureteral Stent Placement.;  Surgeon: Justin Henry MD;  [facility]     COMBINED CYSTOSCOPY, RETROGRADES, URETEROSCOPY, INSERT STENT Bilateral 4/3/2018    Procedure: COMBINED CYSTOSCOPY, RETROGRADES, URETEROSCOPY, INSERT STENT;;  Surgeon: Stuart King MD;  [facility]     COMBINED CYSTOSCOPY, RETROGRADES, URETEROSCOPY, INSERT STENT Bilateral 9/10/2018 "    Procedure: COMBINED CYSTOSCOPY, RETROGRADES, URETEROSCOPY, INSERT STENT;  Cystoscopy, Bilateral Ureteroscopy, Bladder Biopsies, Retrogram Pyelograms, Ureteral Washings and brushings, cysview;  Surgeon: Stuart King MD;  [facility]     CYSTOSCOPY, BIOPSY BLADDER INSTILL OPTICAL AGENT N/A 4/3/2018    Procedure: CYSTOSCOPY, BIOPSY BLADDER INSTILL OPTICAL AGENT;  Cystoscopy, Blue Light Cystoscopy, Bladder Biopsies, Bilateral Selective ureteral washings for Cytology, Bilateral Retrograde Pyelograms, Bilateral Ureteroscopy;  Surgeon: Stuart King MD;  [facility]     CYSTOSCOPY, BIOPSY BLADDER, COMBINED N/A 2/19/2018    Procedure: COMBINED CYSTOSCOPY, BIOPSY BLADDER;  Cystoscopy, Bladder Biopsy;  Surgeon: Kenna La MD;  [facility]     CYSTOSCOPY, FULGURATE BLADDER TUMOR, COMBINED N/A 1/13/2020    Procedure: CYSTOSCOPY, WITH  bladder biopsies and fulguration;  Surgeon: Stuart King MD;  [facility]     CYSTOSCOPY, REMOVE STENT(S), COMBINED  11/13/2018    Procedure: Flexible Cystoscopy with Stent Removal;  Surgeon: Stuart King MD;  [facility]     DAVINCI LYMPHADENECTOMY N/A 11/13/2018    Procedure: Davinci Lymphadenectomy ;  Surgeon: Stuart King MD;  [facility]     DAVINCI NEPHROURETERECTOMY N/A 11/13/2018    Procedure: Right DaVinci Assisted Nephroureterectomy;  Surgeon: Stuart King MD;  [facility]     ENDOSCOPIC ULTRASOUND LOWER GASTROINTESTIONAL TRACT (GI) N/A 10/30/2015    Procedure: ENDOSCOPIC ULTRASOUND LOWER GASTROINTESTIONAL TRACT (GI);  Surgeon: Daniel Jean-Baptiste MD;  [facility]     EYE SURGERY  12/4/17     INSERT PORT VASCULAR ACCESS Right 12/19/2018    Procedure: Chest Port Placement - right;  Surgeon: Stuart Chavez PA-C;  [facility]     IR CHEST PORT PLACEMENT > 5 YRS OF AGE  12/19/2018     IR PORT REMOVAL RIGHT  6/26/2019     LAPAROSCOPIC CHOLECYSTECTOMY  WITH CHOLANGIOGRAMS N/A 11/1/2015    Procedure: LAPAROSCOPIC CHOLECYSTECTOMY WITH CHOLANGIOGRAMS;  Surgeon: Tonie Warren MD;  [facility]     REMOVE PORT VASCULAR ACCESS Right 6/26/2019    Procedure: Right Port Removal;  Surgeon: Froilan Irizarry PA-C;  [facility]     SURGICAL HISTORY OF -   1996    malignant melanoma     SURGICAL HISTORY OF -   1968    thyroid nodule     SURGICAL HISTORY OF -       D & C      Allergies   Allergen Reactions     Codeine         Anesthesia Evaluation     . Pt has had prior anesthetic. Type: General    History of anesthetic complications   - PONV        ROS/MED HX    ENT/Pulmonary:     (+)JESS risk factors snores loudly, hypertension, , . .   (-) tobacco use and asthma   Neurologic:     (+)other neuro tremors, RLS   (-) seizures and Neuropathy   Cardiovascular: Comment:  Stress induced cardiomyopathy EF 35%. Angiogram no CAD. F/U ECHO Chaitanya EF 65%.   AF with RVR in setting of hyperthyroidism. Spontaneous conversion. Managed with B-blocker, ACEI, statin and ASA.   Xarelto DC'd 2/2 bleeding.   Chronic bilateral lower extremity lymphedema.        (+) Dyslipidemia, hypertension----. Taking blood thinners : . CHF etiology: Stress induced cardiomyopathy 12/13/17 Last EF: 55-60% date: 9/2018 . . :. dysrhythmias a-fib, valvular problems/murmurs type: AS and AI mild to moderate on echo 2018:. Previous cardiac testing Echodate:9/2018results:Interpretation Summary  Left ventricular function, chamber size, wall motion, and wall thickness are  normal.The EF is 55-60%.  Right ventricular function, chamber size, wall motion, and thickness are  normal.  Severe left atrial enlargement is present.  Aortic valve poorly visualized but appears to have moderate calcification and  at least mild-moderate stenosis.  Mild to moderate aortic insufficiency is present.  IVC measures 2.47cm and collapses with inspiration. Estimated mean right  atrial pressure is 8 mmHg (mildly elevated).  No  pericardial effusion is present.date: results:ECG reviewed date:2/3/19 results:SR, PACs, LADCath date: 12/2017 results:          METS/Exercise Tolerance: Comment: Limited due to knee pain.  She reports she has been feeling increased SOB lately 1 - Eating, dressing   Hematologic: Comments: Taking ferrous sulfate.    (+) Anemia, -     (-) History of Transfusion   Musculoskeletal: Comment: Osteopenia    Osteoarthritis in knees bilaterally. Right hip pain. Lumbar stenosis, LBP.  (+) arthritis,  -       GI/Hepatic:     (+) GERD Asymptomatic on medication, cholecystitis/cholelithiasis (s/p cholecystectomy),       Renal/Genitourinary: Comment: S/P nephroureterectomy 11/2018    (+) chronic renal disease, type: CRI, Nephrolithiasis , Pt does not require dialysis, Pt has no history of transplant, Other Renal/ Genitourinary, Hematuria      Endo:     (+) thyroid problem (Graves disease)  hyperthyroidism, Obesity, .      Psychiatric:     (+) psychiatric history depression      Infectious Disease:  - neg infectious disease ROS       Malignancy:   (+) Malignancy History of Other  Skin CA status post Surgery, Other CA Urothelial cancer, bladder cancer Active status post Surgery and Chemo         Other:    (+) C-spine cleared: N/A, no H/O Chronic Pain,no other significant disability                        PHYSICAL EXAM:   Mental Status/Neuro: A/A/O   Airway: Facies: Feasible  Mallampati: I  Mouth/Opening: Full  TM distance: > 6 cm  Neck ROM: Limited   Respiratory: Auscultation: CTAB     Resp. Rate: Normal     Resp. Effort: Normal      CV: Rhythm: Regular  Heart: Murmur (Systolic)  Edema: LLE; RLE   Comments:      Dental: Normal Dentition                Assessment:   ASA SCORE: 3    H&P: History and physical reviewed and following examination; no interval change.   Smoking Status:  Non-Smoker/Unknown   NPO Status: NPO Appropriate     Plan:   Anes. Type:  MAC   Pre-Medication: None   Induction:  N/a   Airway: Native Airway    Access/Monitoring: PIV   Maintenance: N/a     Postop Plan:   Postop Pain: None  Postop Sedation/Airway: Not planned  Disposition: Outpatient     PONV Management:   Adult Risk Factors: Female, Non-Smoker   Prevention: Ondansetron     CONSENT: Direct conversation   Plan and risks discussed with: Patient          Comments for Plan/Consent:  Discussed plan for MAC, including risk of aspiration pneumonia, backup plan of general anesthesia and risks of general anesthesia, including sore throat/hoarse voice, abrasions/damage to lips/tongue/teeth, nausea, rare complications (including medication reactions, cardiac, pulmonary).  Discussed possible arterial line. Discussed increased risk of cardiac complications, hypotension.               PAC Discussion and Assessment    ASA Classification: 3  Case is suitable for: [place]  Anesthetic techniques and relevant risks discussed: MAC with GA as backup  Invasive monitoring and risk discussed:   Types:   Possibility and Risk of blood transfusion discussed:   NPO instructions given:   Additional anesthetic preparation and risks discussed:   Needs early admission to pre-op area:   Other:     PAC Resident/NP Anesthesia Assessment:  Dimple Oviedo is an 87 year old female scheduled for CYSTOSCOPY, WITH BLADDER BIOPSY, left ureteral wash, retrograde, possible ureteroscopy on 8/27/20 by Dr. Henry in treatment of bladder cancer.  PAC referral for risk assessment and optimization for anesthesia with comorbid conditions of hypertension, dyslipidemia, CHF, stress induced cardiomyopathy, h/o NSTEMI, anemia, GERD, graves, GERD, anxiety, polymyalgia rheumatica:    Pre-operative considerations:  1.  Cardiac:  Functional status- METS 1. H/o NSTEMI in 2017 with stress induced cardiomyopathy.  Cardiac cath at that time showed no CAD.  ECHO in 2018 showed improved EF- 55-60% with mild-moderate aortic stenosis. Of note, she also has a history of a fib with spontaneous conversion. She was seen  in  PAC clinic back in January with recommendations to complete and ECHO, but this was never completed. She now complains of worsening HERMAN, but no SOB at rest. Again recommended that patient complete ECHO, but this does not need to happen prior to above procedure.  ECHO scheduled for 9/2/20. low risk surgery with 0.9% (RCRI #) risk of major adverse cardiac event.   2.  Pulm:  Airway feasible.  JESS risk: intermediate. Denies pulmonary history or symptoms. COVID testing ordered per surgery team.  3.  GI:  Risk of PONV score = 2.  If > 2, anti-emetic intervention recommended. GERD well controlled on Prilosec.   4.  Heme: h/o anemia using ferrous sulfate- hgb nml 8/10/20.   5.  Endo: h/o hyperthyroid using methimazole   6. Psych: anxiety- stable  7. Urology: h/o urothelial cancer and bladder cancer. S/p surgery and chemotherapy.  Concern for recurrent disease with above procedure planned.     VTE risk: 3%    Patient is currently scheduled at the [facility]- due to her h/o aortic stenosis with worsening SOB, recommend her surgery be moved to the [place] and to be treated as presumptive severe aortic stenosis.  Message was sent to Dr. Henry and Kiera Figueroa with this recommendation.  I also discussed this recommendation with Debra over the telephone.     Patient is optimized and is acceptable candidate for the proposed procedure.  No further diagnostic evaluation is needed.     Patient discussed with Dr. Monsalve    **For further details of assessment, testing, and physical exam please see H and P completed on same date.      Kylie Dumont PA-C        Mid-Level Provider/Resident:   Date:   Time:     Attending Anesthesiologist Anesthesia Assessment:  I have reviewed the medical record and discussed the patient with the DARON.  Patient is scheduled for low risk procedure, cystoscopy with bladder biopsy under monitored anesthesia care.  Patient has known history of mild to moderate aortic stenosis on echocardiogram in 2018.   She was recommended to have a repeat echocardiogram by the PAC clinic in January but has not yet done this.  She is now complaining of increasing shortness of breath with exertion.  She was just seen in the emergency room for this last month and was noted to have elevated NT proBNP.  She has known bladder neoplasm which requires time sensitive therapy.    Because the patient has known aortic stenosis which is not been followed up and now has new symptoms she must presumptively be treated as having symptomatic aortic stenosis until proven otherwise.  We recommend again that she have an echocardiogram to rule out significant progression of her aortic stenosis.  Because the surgery is time sensitive we recommend moving the surgery location from the [facility] to the [facility] without delaying the date of surgery.    Final plan per attending anesthesiologist the day of surgery.      Reviewed and Signed by PAC Anesthesiologist  Anesthesiologist: Airel Monsalve MD  Date:   Time:   Pass/Fail:   Disposition:     PAC Pharmacist Assessment:        Pharmacist:   Date:   Time:    Kylie Dumont PA-C

## 2020-08-24 NOTE — H&P
Pre-Operative H & P     CC:  Preoperative exam to assess for increased cardiopulmonary risk while undergoing surgery and anesthesia.    Date of Encounter: 8/24/2020  Primary Care Physician:  Pop Zapien  associated diagnosis: bladder cancer    HPI  Dimple Oviedo is a 87 year old female who presents for pre-operative H & P in preparation for CYSTOSCOPY, WITH BLADDER BIOPSY, left ureteral wash, retrograde, possible ureteroscopy with Dr. Henry on 8/27/20 at [facility]. Patient is being evaluated for comorbid conditions of hypertension, dyslipidemia, CHF, stress induced cardiomyopathy, h/o NSTEMI, anemia, GERD, graves, GERD, anxiety, polymyalgia rheumatica        Ms. 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  Bladder biopsy 1/13/20 showed urothelial carcinoma in-situ, negative biopsy 5/2020. Cystoscopy on 7/24/20 was negative, although urine cytology was positive for high-grade urothelial carcinoma.  Above procedure now planned.       History is obtained from the patient and chart review.      Past Medical History  Past Medical History:   Diagnosis Date     Calculus of kidney      Chemotherapy-induced neutropenia (H) 3/6/2019     Esophageal reflux      GERD (gastroesophageal reflux disease)      Hyperlipidemia LDL goal <130 5/9/2010     Malignant melanoma of skin of trunk, except scrotum (H)      Nonspecific abnormal finding     has living will 2004 -      Nontoxic multinodular goiter     no further eval /tx rec per pt     Osteopenia      Other psoriasis      Personal history of colonic polyps      PMR (polymyalgia rheumatica) (H)      Stress-induced cardiomyopathy      Undiagnosed cardiac murmurs      Unspecified constipation      Unspecified essential hypertension      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       Past Surgical History  Past Surgical History:   Procedure Laterality Date     BIOPSY       C NONSPECIFIC PROCEDURE   2005    colonoscopy polyp repeat 2010     COLONOSCOPY  2014     COMBINED CYSTOSCOPY, INSERT STENT URETER(S) Bilateral 9/12/2018    Procedure: COMBINED CYSTOSCOPY, INSERT STENT URETER(S);  Cystoscopy Bilateral ureteral Stent Placement.;  Surgeon: Justin Henry MD;  [facility]     COMBINED CYSTOSCOPY, RETROGRADES, URETEROSCOPY, INSERT STENT Bilateral 4/3/2018    Procedure: COMBINED CYSTOSCOPY, RETROGRADES, URETEROSCOPY, INSERT STENT;;  Surgeon: Stuart King MD;  [facility]     COMBINED CYSTOSCOPY, RETROGRADES, URETEROSCOPY, INSERT STENT Bilateral 9/10/2018    Procedure: COMBINED CYSTOSCOPY, RETROGRADES, URETEROSCOPY, INSERT STENT;  Cystoscopy, Bilateral Ureteroscopy, Bladder Biopsies, Retrogram Pyelograms, Ureteral Washings and brushings, cysview;  Surgeon: Stuart King MD;  [facility]     CYSTOSCOPY, BIOPSY BLADDER INSTILL OPTICAL AGENT N/A 4/3/2018    Procedure: CYSTOSCOPY, BIOPSY BLADDER INSTILL OPTICAL AGENT;  Cystoscopy, Blue Light Cystoscopy, Bladder Biopsies, Bilateral Selective ureteral washings for Cytology, Bilateral Retrograde Pyelograms, Bilateral Ureteroscopy;  Surgeon: Stuart King MD;  [facility]     CYSTOSCOPY, BIOPSY BLADDER, COMBINED N/A 2/19/2018    Procedure: COMBINED CYSTOSCOPY, BIOPSY BLADDER;  Cystoscopy, Bladder Biopsy;  Surgeon: Kenna La MD;  Location: [facility] OR     CYSTOSCOPY, FULGURATE BLADDER TUMOR, COMBINED N/A 1/13/2020    Procedure: CYSTOSCOPY, WITH  bladder biopsies and fulguration;  Surgeon: Stuart King MD;  [facility]     CYSTOSCOPY, REMOVE STENT(S), COMBINED  11/13/2018    Procedure: Flexible Cystoscopy with Stent Removal;  Surgeon: Stuart King MD;  [facility]     DAVINCI LYMPHADENECTOMY N/A 11/13/2018    Procedure: Davinci Lymphadenectomy ;  Surgeon: Stuart King MD;  [facility]     DAVINCI NEPHROURETERECTOMY N/A 11/13/2018    Procedure: Right DaVinci  Assisted Nephroureterectomy;  Surgeon: Stuart King MD;  [facility]     ENDOSCOPIC ULTRASOUND LOWER GASTROINTESTIONAL TRACT (GI) N/A 10/30/2015    Procedure: ENDOSCOPIC ULTRASOUND LOWER GASTROINTESTIONAL TRACT (GI);  Surgeon: Daniel Jean-Baptiste MD;  [facility]     EYE SURGERY  12/4/17     INSERT PORT VASCULAR ACCESS Right 12/19/2018    Procedure: Chest Port Placement - right;  Surgeon: Stuart Chavez PA-C;  [facility]     IR CHEST PORT PLACEMENT > 5 YRS OF AGE  12/19/2018     IR PORT REMOVAL RIGHT  6/26/2019     LAPAROSCOPIC CHOLECYSTECTOMY WITH CHOLANGIOGRAMS N/A 11/1/2015    Procedure: LAPAROSCOPIC CHOLECYSTECTOMY WITH CHOLANGIOGRAMS;  Surgeon: Tonie Warren MD;  [facility]     REMOVE PORT VASCULAR ACCESS Right 6/26/2019    Procedure: Right Port Removal;  Surgeon: Friolan Irizarry PA-C;  [facility]     SURGICAL HISTORY OF -   1996    malignant melanoma     SURGICAL HISTORY OF -   1968    thyroid nodule     SURGICAL HISTORY OF -       D & C       Hx of Blood transfusions/reactions: denies     Hx of abnormal bleeding or anti-platelet use: ASA 81    Menstrual history: No LMP recorded. Patient is postmenopausal.:    Steroid use in the last year: denies    Personal or FH with difficulty with Anesthesia:  denies    Prior to Admission Medications  Current Outpatient Medications   Medication Sig Dispense Refill     acetaminophen (TYLENOL) 650 MG CR tablet Take 1 tablet (650 mg) by mouth every 8 hours as needed for pain (Patient taking differently: Take 1,300 mg by mouth 2 times daily as needed for pain ) 100 tablet 0     alendronate (FOSAMAX) 70 MG tablet TAKE 1 TABLET EVERY 7 DAYS AT LEAST 60 MINUTES BEFORE BREAKFAST AS DIRECTED. (Patient taking differently: Take 70 mg by mouth every 7 days TAKE 1 TABLET EVERY 7 DAYS AT LEAST 60 MINUTES BEFORE BREAKFAST AS DIRECTED.) 12 tablet 3     Calcium Citrate-Vitamin D (CALCIUM + D PO) Take 1 tablet by mouth every  morning        cycloSPORINE (RESTASIS) 0.05 % ophthalmic emulsion Place 1 drop into both eyes 2 times daily       ferrous sulfate 325 (65 Fe) MG TBEC EC tablet Take 325 mg by mouth every morning        furosemide (LASIX) 20 MG tablet Take 1 tablet (20 mg) by mouth every morning 90 tablet 3     irbesartan (AVAPRO) 300 MG tablet TAKE 1 TABLET EVERY DAY (Patient taking differently: Take 300 mg by mouth every morning TAKE 1 TABLET EVERY DAY) 90 tablet 3     levofloxacin (LEVAQUIN) 500 MG tablet Take 1 tablet 6 hours post treatment and 1 tablet each AM following each treatment. 6 tablet 6     lovastatin (MEVACOR) 40 MG tablet TAKE 1 TABLET AT BEDTIME (HYPERLIPIDEMIA LDL GOAL BELOW 130) 90 tablet 0     methimazole (TAPAZOLE) 5 MG tablet Take 1 tablet (5 mg) by mouth daily (Patient taking differently: Take 5 mg by mouth every morning ) 90 tablet 3     Multiple Vitamins-Minerals (PRESERVISION AREDS 2 PO) Take 1 tablet by mouth every morning       Omega-3 Fatty Acids (FISH OIL) 500 MG CAPS Take 1 capsule by mouth every morning        omeprazole (PRILOSEC) 20 MG DR capsule TAKE 1 CAPSULE EVERY DAY (Patient taking differently: Take 20 mg by mouth every morning TAKE 1 CAPSULE EVERY DAY) 90 capsule 3     Probiotic Product (PROBIOTIC ADVANCED PO) Take 1 capsule by mouth every morning        propranolol ER (INDERAL LA) 60 MG 24 hr capsule TAKE 1 CAPSULE EVERY MORNING 90 capsule 1     rOPINIRole (REQUIP) 0.25 MG tablet TAKE 1 TABLET IN THE AFTERNOON AND TAKE 2 TABLETS EVERY NIGHT 270 tablet 1     sertraline (ZOLOFT) 100 MG tablet TAKE 1 TABLET (100 MG) BY MOUTH EVERY MORNING 90 tablet 2     traZODone (DESYREL) 50 MG tablet TAKE 1/2 TABLET AT BEDTIME 45 tablet 2     aspirin 81 MG tablet Take 81 mg by mouth every evening PT last dose 8.19.2020       nitroGLYcerin (NITROSTAT) 0.4 MG sublingual tablet For chest pain place 1 tablet under the tongue every 5 minutes for 3 doses. If symptoms persist 5 minutes after 1st dose call [phone]. [phone]  tablet 3       Allergies  Allergies   Allergen Reactions     Codeine        Social History  Social History     Socioeconomic History     Marital status:      Spouse name:      Number of children: 2     Years of education: Not on file     Highest education level: Not on file   Occupational History     Employer: RETIRED   Social Needs     Financial resource strain: Not on file     Food insecurity     Worry: Not on file     Inability: Not on file     Transportation needs     Medical: Not on file     Non-medical: Not on file   Tobacco Use     Smoking status: Never Smoker     Smokeless tobacco: Never Used   Substance and Sexual Activity     Alcohol use: No     Alcohol/week: 0.0 standard drinks     Comment: rare     Drug use: No     Sexual activity: Yes     Partners: Male   Lifestyle     Physical activity     Days per week: Not on file     Minutes per session: Not on file     Stress: Not on file   Relationships     Social connections     Talks on phone: Not on file     Gets together: Not on file     Attends Yazidism service: Not on file     Active member of club or organization: Not on file     Attends meetings of clubs or organizations: Not on file     Relationship status: Not on file     Intimate partner violence     Fear of current or ex partner: Not on file     Emotionally abused: Not on file     Physically abused: Not on file     Forced sexual activity: Not on file   Other Topics Concern      Service No     Blood Transfusions No     Caffeine Concern Not Asked     Occupational Exposure Not Asked     Hobby Hazards Not Asked     Sleep Concern Not Asked     Stress Concern Not Asked     Weight Concern Not Asked     Special Diet Not Asked     Back Care Not Asked     Exercise Yes     Comment: curves     Bike Helmet Not Asked     Seat Belt Yes     Self-Exams Yes     Parent/sibling w/ CABG, MI or angioplasty before 65F 55M? No   Social History Narrative    December 7, 2009    Balanced Diet - Yes     Osteoporosis Prevention Measures - Dairy servings per day: 2 and Medication/Supplements (See current meds)    Regular Exercise -  Yes Describe curves, walking    Dental Exam - YES - Date: 10/2009    Eye Exam - YES - Date: 2008    Self Breast Exam - Yes    Abuse: Current or Past (Physical, Sexual or Emotional)- No    Do you feel safe in your environment - Yes    Guns stored in the home - No    Sunscreen used - Yes    Seatbelts used - Yes    Lipids -  YES - Date: 11/24/2009    Glucose -  YES - Date: 11/24/2009    Colon Cancer Screening - Colonoscopy 11/18/2005(date completed)    Hemoccults - YES - Date: 10/12/2004    Pap Test -  YES - Date: 09/22/2004    Do you have any concerns about STD's -  No    Mammography - YES - Date: 11/24/2009    DEXA - YES - Date: 11/20/2008    Immunizations reviewed and up to date - Yes    PAULETTE Spencer CMA               Family History  Family History   Problem Relation Age of Onset     Cancer Father         dec - esophageal and laryngeal     Heart Disease Mother      Respiratory Mother         dec     Breast Cancer Daughter      Other Cancer Daughter      Thyroid Disease Daughter      Asthma Daughter      Hyperlipidemia Son      Diabetes Son      Anesthesia Reaction No family hx of      Deep Vein Thrombosis (DVT) No family hx of      ROS    ENT/Pulmonary:     (+)JESS risk factors snores loudly, hypertension, , . .   (-) tobacco use and asthma   Neurologic:     (+)other neuro tremors, RLS   (-) seizures and Neuropathy   Cardiovascular: Comment:  Stress induced cardiomyopathy EF 35%. Angiogram no CAD. F/U ECHO Chaitanya EF 65%.   AF with RVR in setting of hyperthyroidism. Spontaneous conversion. Managed with B-blocker, ACEI, statin and ASA.   Xarelto DC'd 2/2 bleeding.   Chronic bilateral lower extremity lymphedema.        (+) Dyslipidemia, hypertension----. Taking blood thinners : . CHF etiology: Stress induced cardiomyopathy 12/13/17 Last EF: 55-60% date: 9/2018 . . :. dysrhythmias a-fib, valvular  problems/murmurs type: AS and AI mild to moderate on echo 2018:. Previous cardiac testing Echodate:9/2018results:Interpretation Summary  Left ventricular function, chamber size, wall motion, and wall thickness are  normal.The EF is 55-60%.  Right ventricular function, chamber size, wall motion, and thickness are  normal.  Severe left atrial enlargement is present.  Aortic valve poorly visualized but appears to have moderate calcification and  at least mild-moderate stenosis.  Mild to moderate aortic insufficiency is present.  IVC measures 2.47cm and collapses with inspiration. Estimated mean right  atrial pressure is 8 mmHg (mildly elevated).  No pericardial effusion is present.date: results:ECG reviewed date:2/3/19 results:SR, PACs, LADCath date: 12/2017 results:          METS/Exercise Tolerance: Comment: Limited due to knee pain.  She reports she has been feeling increased SOB lately 1 - Eating, dressing   Hematologic: Comments: Taking ferrous sulfate.    (+) Anemia, -     (-) History of Transfusion   Musculoskeletal: Comment: Osteopenia    Osteoarthritis in knees bilaterally. Right hip pain. Lumbar stenosis, LBP.  (+) arthritis,  -       GI/Hepatic:     (+) GERD Asymptomatic on medication, cholecystitis/cholelithiasis (s/p cholecystectomy),       Renal/Genitourinary: Comment: S/P nephroureterectomy 11/2018    (+) chronic renal disease, type: CRI, Nephrolithiasis , Pt does not require dialysis, Pt has no history of transplant, Other Renal/ Genitourinary, Hematuria      Endo:     (+) thyroid problem (Graves disease)  hyperthyroidism, Obesity, .      Psychiatric:     (+) psychiatric history depression      Infectious Disease:  - neg infectious disease ROS       Malignancy:   (+) Malignancy History of Other  Skin CA status post Surgery, Other CA Urothelial cancer, bladder cancer Active status post Surgery and Chemo         Other:    (+) C-spine cleared: N/A, no H/O Chronic Pain,no other significant disability     "      The complete review of systems is negative other than noted in the HPI or here.   Temp: 98  F (36.7  C) Temp src: Oral BP: 137/78 Pulse: 61   Resp: 16 SpO2: 95 %         165 lbs 0 oz  4' 9\"   Body mass index is 35.71 kg/m .       Physical Exam  Constitutional: Awake, alert, cooperative, no apparent distress, and appears stated age.  Eyes: Pupils equal, round and reactive to light, extra ocular muscles intact, sclera clear, conjunctiva normal.  HENT: Normocephalic, oral pharynx with moist mucus membranes, good dentition. No goiter appreciated.   Respiratory: Clear to auscultation bilaterally, no crackles or wheezing.  Cardiovascular: Regular rate and rhythm, soft systolic murmur noted.  Carotids no bruits. Bilateral LE edema. Palpable pulses to radial arteries.   GI: Normal bowel sounds, soft, non-distended, non-tender  Lymph/Hematologic: No cervical lymphadenopathy and no supraclavicular lymphadenopathy.  Genitourinary:  deferred  Skin: Warm and dry.    Musculoskeletal: Full ROM of neck. There is no redness, warmth, or swelling of the exposed joints. Gross motor strength is normal.    Neurologic: Awake, alert, oriented to name, place and time. Cranial nerves II-XII are grossly intact. Gait is normal.   Neuropsychiatric: Calm, cooperative. Normal affect.     Labs: (personally reviewed)  Component      Latest Ref Rng & Units 7/20/2020 8/10/2020   Sodium      133 - 144 mmol/L  138   Potassium      3.4 - 5.3 mmol/L  4.2   Chloride      94 - 109 mmol/L  105   Carbon Dioxide      20 - 32 mmol/L  28   Anion Gap      3 - 14 mmol/L  5   Glucose      70 - 99 mg/dL  99   Urea Nitrogen      7 - 30 mg/dL  19   Creatinine      0.52 - 1.04 mg/dL  1.05 (H)   GFR Estimate      >60 mL/min/1.73:m2  48 (L)   GFR Estimate If Black      >60 mL/min/1.73:m2  55 (L)   Calcium      8.5 - 10.1 mg/dL  9.2   Bilirubin Total      0.2 - 1.3 mg/dL  0.5   Albumin      3.4 - 5.0 g/dL  3.4   Protein Total      6.8 - 8.8 g/dL  7.6   Alkaline " Phosphatase      40 - 150 U/L  91   ALT      0 - 50 U/L  21   AST      0 - 45 U/L  16   WBC      4.0 - 11.0 10e9/L  6.2   RBC Count      3.8 - 5.2 10e12/L  4.11   Hemoglobin      11.7 - 15.7 g/dL  13.1   Hematocrit      35.0 - 47.0 %  41.0   MCV      78 - 100 fl  100   MCH      26.5 - 33.0 pg  31.9   MCHC      31.5 - 36.5 g/dL  32.0   RDW      10.0 - 15.0 %  12.9   Platelet Count      150 - 450 10e9/L  231   Diff Method        Automated Method   N-Terminal Pro BNP Inpatient      0 - 1,800 pg/mL 929      EKG 7/20/20    SR with PACs, LAD and LVH        ECHO 2018    Interpretation Summary    Left ventricular function, chamber size, wall motion, and wall thickness are    normal.The EF is 55-60%.    Right ventricular function, chamber size, wall motion, and thickness are    normal.    Severe left atrial enlargement is present.    Aortic valve poorly visualized but appears to have moderate calcification and    at least mild-moderate stenosis.    Mild to moderate aortic insufficiency is present.    IVC measures 2.47cm and collapses with inspiration. Estimated mean right    atrial pressure is 8 mmHg (mildly elevated).    No pericardial effusion is present.      Outside records reviewed from: care everywhere    ASSESSMENT and PLAN  Dimple Oviedo is an 87 year old female scheduled for CYSTOSCOPY, WITH BLADDER BIOPSY, left ureteral wash, retrograde, possible ureteroscopy on 8/27/20 by Dr. Henry in treatment of bladder cancer.  PAC referral for risk assessment and optimization for anesthesia with comorbid conditions of hypertension, dyslipidemia, CHF, stress induced cardiomyopathy, h/o NSTEMI, anemia, GERD, graves, GERD, anxiety, polymyalgia rheumatica:    Pre-operative considerations:  1.  Cardiac:  Functional status- METS 1. H/o NSTEMI in 2017 with stress induced cardiomyopathy.  Cardiac cath at that time showed no CAD.  ECHO in 2018 showed improved EF- 55-60% with mild-moderate aortic stenosis. Of note, she also has a  history of a fib with spontaneous conversion. She was seen in  PAC clinic back in January with recommendations to complete and ECHO, but this was never completed. She now complains of worsening HERMAN, but no SOB at rest. Again recommended that patient complete ECHO, but this does not need to happen prior to above procedure.  ECHO scheduled for 9/2/20. low risk surgery with 0.9% (RCRI #) risk of major adverse cardiac event.   2.  Pulm:  Airway feasible.  JESS risk: intermediate. Denies pulmonary history or symptoms. COVID testing ordered per surgery team.  3.  GI:  Risk of PONV score = 2.  If > 2, anti-emetic intervention recommended. GERD well controlled on Prilosec.   4.  Heme: h/o anemia using ferrous sulfate- hgb nml 8/10/20.   5.  Endo: h/o hyperthyroid using methimazole   6. Psych: anxiety- stable  7. Urology: h/o urothelial cancer and bladder cancer. S/p surgery and chemotherapy.  Concern for recurrent disease with above procedure planned.     VTE risk: 3%    Patient is currently scheduled at the [facility]- due to her h/o aortic stenosis with worsening SOB, recommend her surgery be moved to the [facility] and to be treated as presumptive severe aortic stenosis.  Message was sent to Dr. Henry and Kiera Figueroa with this recommendation.  I also discussed this recommendation with Debra over the telephone.     Patient is optimized and is acceptable candidate for the proposed procedure.  No further diagnostic evaluation is needed.     Patient discussed with YUNI StahlC  Preoperative Assessment Center  [facility]  Phone: [phone]  Fax: [phone]

## 2020-08-25 ENCOUNTER — MYC MEDICAL ADVICE (OUTPATIENT)
Dept: UROLOGY | Facility: CLINIC | Age: 85
End: 2020-08-25

## 2020-08-25 LAB
SARS-COV-2 RNA SPEC QL NAA+PROBE: NOT DETECTED
SPECIMEN SOURCE: NORMAL

## 2020-08-26 ENCOUNTER — HOSPITAL ENCOUNTER (OUTPATIENT)
Facility: CLINIC | Age: 85
Discharge: HOME | End: 2020-08-26
Attending: UROLOGY | Admitting: UROLOGY
Payer: MEDICARE

## 2020-08-26 ENCOUNTER — APPOINTMENT (OUTPATIENT)
Dept: GENERAL RADIOLOGY | Facility: CLINIC | Age: 85
End: 2020-08-26
Attending: UROLOGY
Payer: MEDICARE

## 2020-08-26 ENCOUNTER — ANESTHESIA (OUTPATIENT)
Dept: SURGERY | Facility: CLINIC | Age: 85
End: 2020-08-26
Payer: MEDICARE

## 2020-08-26 VITALS
DIASTOLIC BLOOD PRESSURE: 55 MMHG | WEIGHT: 163.14 LBS | OXYGEN SATURATION: 97 % | TEMPERATURE: 98.5 F | BODY MASS INDEX: 35.2 KG/M2 | HEART RATE: 52 BPM | RESPIRATION RATE: 16 BRPM | HEIGHT: 57 IN | SYSTOLIC BLOOD PRESSURE: 134 MMHG

## 2020-08-26 DIAGNOSIS — C67.8 MALIGNANT NEOPLASM OF OVERLAPPING SITES OF BLADDER (H): ICD-10-CM

## 2020-08-26 LAB
GLUCOSE SERPL-MCNC: 105 MG/DL (ref 70–99)
POTASSIUM SERPL-SCNC: 4.7 MMOL/L (ref 3.4–5.3)

## 2020-08-26 PROCEDURE — 25800030 ZZH RX IP 258 OP 636: Performed by: NURSE ANESTHETIST, CERTIFIED REGISTERED

## 2020-08-26 PROCEDURE — 40000278 XR SURGERY CARM FLUORO LESS THAN 5 MIN: Mod: TC

## 2020-08-26 PROCEDURE — 71000027 ZZH RECOVERY PHASE 2 EACH 15 MINS: Performed by: UROLOGY

## 2020-08-26 PROCEDURE — 88305 TISSUE EXAM BY PATHOLOGIST: CPT | Mod: 26 | Performed by: UROLOGY

## 2020-08-26 PROCEDURE — 25500064 ZZH RX 255 OP 636: Performed by: UROLOGY

## 2020-08-26 PROCEDURE — 40000170 ZZH STATISTIC PRE-PROCEDURE ASSESSMENT II: Performed by: UROLOGY

## 2020-08-26 PROCEDURE — 27210794 ZZH OR GENERAL SUPPLY STERILE: Performed by: UROLOGY

## 2020-08-26 PROCEDURE — 36000061 ZZH SURGERY LEVEL 3 W FLUORO 1ST 30 MIN - UMMC: Performed by: UROLOGY

## 2020-08-26 PROCEDURE — 37000008 ZZH ANESTHESIA TECHNICAL FEE, 1ST 30 MIN: Performed by: UROLOGY

## 2020-08-26 PROCEDURE — 84132 ASSAY OF SERUM POTASSIUM: CPT | Performed by: ANESTHESIOLOGY

## 2020-08-26 PROCEDURE — 36415 COLL VENOUS BLD VENIPUNCTURE: CPT | Performed by: ANESTHESIOLOGY

## 2020-08-26 PROCEDURE — 37000009 ZZH ANESTHESIA TECHNICAL FEE, EACH ADDTL 15 MIN: Performed by: UROLOGY

## 2020-08-26 PROCEDURE — 25000128 H RX IP 250 OP 636: Performed by: NURSE ANESTHETIST, CERTIFIED REGISTERED

## 2020-08-26 PROCEDURE — 25000128 H RX IP 250 OP 636: Performed by: UROLOGY

## 2020-08-26 PROCEDURE — 25000125 ZZHC RX 250: Performed by: NURSE ANESTHETIST, CERTIFIED REGISTERED

## 2020-08-26 PROCEDURE — 88305 TISSUE EXAM BY PATHOLOGIST: CPT | Performed by: UROLOGY

## 2020-08-26 PROCEDURE — 82947 ASSAY GLUCOSE BLOOD QUANT: CPT | Performed by: ANESTHESIOLOGY

## 2020-08-26 PROCEDURE — 88112 CYTOPATH CELL ENHANCE TECH: CPT | Performed by: UROLOGY

## 2020-08-26 PROCEDURE — 88112 CYTOPATH CELL ENHANCE TECH: CPT | Mod: 26 | Performed by: UROLOGY

## 2020-08-26 PROCEDURE — 36000059 ZZH SURGERY LEVEL 3 EA 15 ADDTL MIN UMMC: Performed by: UROLOGY

## 2020-08-26 RX ORDER — DEXAMETHASONE SODIUM PHOSPHATE 4 MG/ML
INJECTION, SOLUTION INTRA-ARTICULAR; INTRALESIONAL; INTRAMUSCULAR; INTRAVENOUS; SOFT TISSUE PRN
Status: DISCONTINUED | OUTPATIENT
Start: 2020-08-26 | End: 2020-08-26

## 2020-08-26 RX ORDER — NALOXONE HYDROCHLORIDE 0.4 MG/ML
.1-.4 INJECTION, SOLUTION INTRAMUSCULAR; INTRAVENOUS; SUBCUTANEOUS
Status: DISCONTINUED | OUTPATIENT
Start: 2020-08-26 | End: 2020-08-26 | Stop reason: HOSPADM

## 2020-08-26 RX ORDER — CEFAZOLIN SODIUM 2 G/100ML
2 INJECTION, SOLUTION INTRAVENOUS
Status: COMPLETED | OUTPATIENT
Start: 2020-08-26 | End: 2020-08-26

## 2020-08-26 RX ORDER — LIDOCAINE HYDROCHLORIDE 20 MG/ML
INJECTION, SOLUTION INFILTRATION; PERINEURAL PRN
Status: DISCONTINUED | OUTPATIENT
Start: 2020-08-26 | End: 2020-08-26

## 2020-08-26 RX ORDER — SODIUM CHLORIDE, SODIUM LACTATE, POTASSIUM CHLORIDE, CALCIUM CHLORIDE 600; 310; 30; 20 MG/100ML; MG/100ML; MG/100ML; MG/100ML
INJECTION, SOLUTION INTRAVENOUS CONTINUOUS PRN
Status: DISCONTINUED | OUTPATIENT
Start: 2020-08-26 | End: 2020-08-26

## 2020-08-26 RX ORDER — EPHEDRINE SULFATE 50 MG/ML
INJECTION, SOLUTION INTRAVENOUS PRN
Status: DISCONTINUED | OUTPATIENT
Start: 2020-08-26 | End: 2020-08-26

## 2020-08-26 RX ORDER — OXYCODONE HYDROCHLORIDE 5 MG/1
5 TABLET ORAL EVERY 6 HOURS PRN
Qty: 10 TABLET | Refills: 0 | Status: SHIPPED | OUTPATIENT
Start: 2020-08-26 | End: 2020-08-29

## 2020-08-26 RX ORDER — PROPOFOL 10 MG/ML
INJECTION, EMULSION INTRAVENOUS CONTINUOUS PRN
Status: DISCONTINUED | OUTPATIENT
Start: 2020-08-26 | End: 2020-08-26

## 2020-08-26 RX ORDER — ACETAMINOPHEN 325 MG/1
975 TABLET ORAL ONCE
Status: DISCONTINUED | OUTPATIENT
Start: 2020-08-26 | End: 2020-08-26 | Stop reason: HOSPADM

## 2020-08-26 RX ORDER — HYDROMORPHONE HYDROCHLORIDE 1 MG/ML
.3-.5 INJECTION, SOLUTION INTRAMUSCULAR; INTRAVENOUS; SUBCUTANEOUS EVERY 10 MIN PRN
Status: DISCONTINUED | OUTPATIENT
Start: 2020-08-26 | End: 2020-08-26 | Stop reason: HOSPADM

## 2020-08-26 RX ORDER — SODIUM CHLORIDE, SODIUM LACTATE, POTASSIUM CHLORIDE, CALCIUM CHLORIDE 600; 310; 30; 20 MG/100ML; MG/100ML; MG/100ML; MG/100ML
INJECTION, SOLUTION INTRAVENOUS CONTINUOUS
Status: DISCONTINUED | OUTPATIENT
Start: 2020-08-26 | End: 2020-08-26 | Stop reason: HOSPADM

## 2020-08-26 RX ORDER — FENTANYL CITRATE 50 UG/ML
25-50 INJECTION, SOLUTION INTRAMUSCULAR; INTRAVENOUS EVERY 5 MIN PRN
Status: DISCONTINUED | OUTPATIENT
Start: 2020-08-26 | End: 2020-08-26 | Stop reason: HOSPADM

## 2020-08-26 RX ORDER — CEFAZOLIN SODIUM 1 G/3ML
1 INJECTION, POWDER, FOR SOLUTION INTRAMUSCULAR; INTRAVENOUS SEE ADMIN INSTRUCTIONS
Status: DISCONTINUED | OUTPATIENT
Start: 2020-08-26 | End: 2020-08-26 | Stop reason: HOSPADM

## 2020-08-26 RX ORDER — ONDANSETRON 2 MG/ML
4 INJECTION INTRAMUSCULAR; INTRAVENOUS EVERY 30 MIN PRN
Status: DISCONTINUED | OUTPATIENT
Start: 2020-08-26 | End: 2020-08-26 | Stop reason: HOSPADM

## 2020-08-26 RX ORDER — PROPOFOL 10 MG/ML
INJECTION, EMULSION INTRAVENOUS PRN
Status: DISCONTINUED | OUTPATIENT
Start: 2020-08-26 | End: 2020-08-26

## 2020-08-26 RX ORDER — ONDANSETRON 4 MG/1
4 TABLET, ORALLY DISINTEGRATING ORAL EVERY 30 MIN PRN
Status: DISCONTINUED | OUTPATIENT
Start: 2020-08-26 | End: 2020-08-26 | Stop reason: HOSPADM

## 2020-08-26 RX ORDER — OXYCODONE HYDROCHLORIDE 5 MG/1
5 TABLET ORAL EVERY 4 HOURS PRN
Status: DISCONTINUED | OUTPATIENT
Start: 2020-08-26 | End: 2020-08-26 | Stop reason: HOSPADM

## 2020-08-26 RX ADMIN — PHENYLEPHRINE HYDROCHLORIDE 0.2 MCG/KG/MIN: 10 INJECTION INTRAVENOUS at 14:30

## 2020-08-26 RX ADMIN — HYDROMORPHONE HYDROCHLORIDE 0.1 MG: 1 INJECTION, SOLUTION INTRAMUSCULAR; INTRAVENOUS; SUBCUTANEOUS at 14:58

## 2020-08-26 RX ADMIN — PROPOFOL 20 MG: 10 INJECTION, EMULSION INTRAVENOUS at 15:09

## 2020-08-26 RX ADMIN — LIDOCAINE HYDROCHLORIDE 20 MG: 20 INJECTION, SOLUTION INFILTRATION; PERINEURAL at 14:48

## 2020-08-26 RX ADMIN — LIDOCAINE HYDROCHLORIDE 20 MG: 20 INJECTION, SOLUTION INFILTRATION; PERINEURAL at 14:30

## 2020-08-26 RX ADMIN — DEXAMETHASONE SODIUM PHOSPHATE 4 MG: 4 INJECTION, SOLUTION INTRAMUSCULAR; INTRAVENOUS at 14:30

## 2020-08-26 RX ADMIN — CEFAZOLIN 2 G: 10 INJECTION, POWDER, FOR SOLUTION INTRAVENOUS at 14:25

## 2020-08-26 RX ADMIN — MIDAZOLAM 1 MG: 1 INJECTION INTRAMUSCULAR; INTRAVENOUS at 14:58

## 2020-08-26 RX ADMIN — PROPOFOL 30 MCG/KG/MIN: 10 INJECTION, EMULSION INTRAVENOUS at 14:30

## 2020-08-26 RX ADMIN — HYDROMORPHONE HYDROCHLORIDE 0.2 MG: 1 INJECTION, SOLUTION INTRAMUSCULAR; INTRAVENOUS; SUBCUTANEOUS at 14:34

## 2020-08-26 RX ADMIN — EPHEDRINE SULFATE 5 MG: 50 INJECTION, SOLUTION INTRAVENOUS at 15:19

## 2020-08-26 RX ADMIN — SODIUM CHLORIDE, POTASSIUM CHLORIDE, SODIUM LACTATE AND CALCIUM CHLORIDE: 600; 310; 30; 20 INJECTION, SOLUTION INTRAVENOUS at 14:19

## 2020-08-26 RX ADMIN — PROPOFOL 20 MG: 10 INJECTION, EMULSION INTRAVENOUS at 14:48

## 2020-08-26 ASSESSMENT — MIFFLIN-ST. JEOR: SCORE: 1049

## 2020-08-26 NOTE — ANESTHESIA POSTPROCEDURE EVALUATION
Anesthesia POST Procedure Evaluation    Patient: Dimple Oviedo   MRN:     [id number] Gender:   female   Age:    87 year old :      1933        Preoperative Diagnosis: Malignant neoplasm of overlapping sites of bladder (H) [C67.8]   Procedure(s):  Cystoscopy with Bladder Biopsy, Left Ureteral Wash, Left ureteral Retrograde   Postop Comments: No value filed.     Anesthesia Type: MAC          Postop Pain Control: Uneventful            Sign Out: Well controlled pain   PONV: No   Neuro/Psych: Uneventful            Sign Out: Acceptable/Baseline neuro status   Airway/Respiratory: Uneventful            Sign Out: Acceptable/Baseline resp. status   CV/Hemodynamics: Uneventful            Sign Out: Acceptable CV status   Other NRE: NONE   DID A NON-ROUTINE EVENT OCCUR? No         Last Anesthesia Record Vitals:  CRNA VITALS  2020 1459 - 2020 1559      2020             Temp:  37.1  C (98.8  F)          Last PACU Vitals:  Vitals Value Taken Time   /46 2020  3:32 PM   Temp 37.1  C (98.8  F) 2020  3:32 PM   Pulse 59 2020  3:32 PM   Resp 11 2020  3:32 PM   SpO2 94 % 2020  3:32 PM   Temp src     NIBP 152/56 2020  3:26 PM   Pulse 56 2020  3:27 PM   SpO2 98 % 2020  3:27 PM   Resp     Temp     Ht Rate 61 2020  3:25 PM   Temp 2           Electronically Signed By: Lawrence Hayes MD, 2020, 4:20 PM

## 2020-08-26 NOTE — ANESTHESIA CARE TRANSFER NOTE
Patient: Dimple Oviedo    Procedure(s):  Cystoscopy with Bladder Biopsy, Left Ureteral Wash, Left ureteral Retrograde    Diagnosis: Malignant neoplasm of overlapping sites of bladder (H) [C67.8]  Diagnosis Additional Information: No value filed.    Anesthesia Type:   MAC     Note:  Airway :Room Air  Patient transferred to:Phase II  Comments: Strong SV, VSS. Report to RN.  Handoff Report: Identifed the Patient, Identified the Reponsible Provider, Reviewed the pertinent medical history, Discussed the surgical course, Reviewed Intra-OP anesthesia mangement and issues during anesthesia, Set expectations for post-procedure period and Allowed opportunity for questions and acknowledgement of understanding      Vitals: (Last set prior to Anesthesia Care Transfer)    CRNA VITALS  8/26/2020 1459 - 8/26/2020 1542      8/26/2020             Temp:  37.1  C (98.8  F)                Electronically Signed By: NELSON Batista CRNA  August 26, 2020  3:42 PM

## 2020-08-26 NOTE — OP NOTE
OPERATIVE REPORT    PREOPERATIVE DIAGNOSIS:  CIS of the bladder, positive cytology    POSTOPERATIVE DIAGNOSIS: Same    PROCEDURES PERFORMED:   1. Cystourethroscopy  2. Random bladder biopsies  3. Fulguration of biopsied areas  4. Left ureteral washing  5. Interpretation of retrograde pyelogram fluoroscopic imaging    STAFF SURGEON: Justin Henry MD  RESIDENT(S):  Jen Reagan MD    ANESTHESIA: Monitor Anesthesia Care    ESTIMATED BLOOD LOSS: < 5 mL.     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 She recently had a cystoscopy with Dr. Henry 7/24/20 which was normal, however she had a positive FISH upon urine culture prompting random bladder biopsy and left ureteral washing today.     DESCRIPTION OF PROCEDURE:   After verification of informed consent was obtained, the patient was brought to the operating room, and moved to the operating table. After adequate anesthesia was induced, the patient was repositioned in dorsal lithotomy position and prepped and draped in the usual sterile fashion. A formal timeout was performed to confirm the correct patient, procedure and operative site.     A 22-Bruneian rigid cystoscope was inserted into a well lubricated urethra. We began by performing a white light cystourethroscopy. The urethra was unremarkable. The ureteral orifices were in their orthotopic positions bilaterally, the right ureteral orifice was noted to be scarred. There were no intravesical stones or diverticuli and the bladder was mildly trabeculated.     We cannulated the left ureteral orifice with a 5-Swiss open  ended catheter, and obtained ureteral washings. A left retrograde pyelogram was taken as the 5 German open ended catheter was pulled back through the ureter, and no filling defects in the left kidney or left ureter were noted. No hydronephrosis was present.    We used a cold-cup flexible biopsy forceps to biopsy the left and right lateral walls, posterior wall and bladder dome and passed these off for pathology. A Bugbee was used to fulgurate the biopsied sites and any bleeding areas. The bladder was re-examined under low pressure and hemostasis was confirmed. At this point, the bladder was drained and the cystoscope withdrawn.    The procedure was then concluded. The patient was transferred to the postanesthesia care unit in stable condition and tolerated the procedure well.    POSTOP PLAN:  1. Follow up with Dr. Henry in 2 week in [facility] clinic for pathology review.    Jen Reagan MD  PGY-2 Urology

## 2020-08-26 NOTE — OR NURSING
Dimple refuses to take off her wedding ring. Tapped to finger. Explained reason for removing the ring during surgery.

## 2020-08-26 NOTE — OR NURSING
Patient arrived to Phase 2 as a MAC. Dr. Hayes called to notify that patient's HR has been dipping into the high 40's. 47 was the lowest HR. /49 with MAP of 74. Patient not lightheaded or dizzy, no chest pain/SOB.     No new orders, will continue to monitor.

## 2020-08-26 NOTE — DISCHARGE INSTRUCTIONS
[facility]   Adult Discharge Orders & Instructions     For 24 hours after surgery    1. Get plenty of rest.  A responsible adult must stay with you for at least 24 hours after you leave the hospital.   2. Do not drive or use heavy equipment.  If you have weakness or tingling, don't drive or use heavy equipment until this feeling goes away.  3. Do not drink alcohol.  4. Avoid strenuous or risky activities.  Ask for help when climbing stairs.   5. You may feel lightheaded.  IF so, sit for a few minutes before standing.  Have someone help you get up.   6. If you have nausea (feel sick to your stomach): Drink only clear liquids such as apple juice, ginger ale, broth or 7-Up.  Rest may also help.  Be sure to drink enough fluids.  Move to a regular diet as you feel able.  7. You may have a slight fever. Call the doctor if your fever is over 100 F (37.7 C) (taken under the tongue) or lasts longer than 24 hours.  8. You may have a dry mouth, a sore throat, muscle aches or trouble sleeping.  These should go away after 24 hours.  9. Do not make important or legal decisions.   Call your doctor for any of the followin.  Signs of infection (fever, growing tenderness at the surgery site, a large amount of drainage or bleeding, severe pain, foul-smelling drainage, redness, swelling).    2. It has been over 8 to 10 hours since surgery and you are still not able to urinate (pass water).    3.  Headache for over 24 hours.    4.  Numbness, tingling or weakness the day after surgery (if you had spinal anesthesia).  To contact a doctor, call ________________________________________Discharge Instructions:             Following a Cystoscopy/Stent and/or Ureteroscopy    Drainage:    Urine may be slightly bloody but should taper off in a few days.    You may have some frequency and urgency for a few days.    Comfort:    A burning sensation when urinating is common. Drinking extra fluids helps decrease this burning  feeling and also helps to clear the bloody urine.    Mild abdominal cramps may occur for a few days.    Baths:    Daily tub baths aide in passing urine.    Frequent use of bubble bath is discouraged since it encourages urinary tract infections.    Report to your doctor at once if you experience:    Inability to pass your urine    Continuous or heavy bleeding    Severe Pain    Elevated temperature > than 101.5 for 24 hours    Home Activity:    The day of surgery spend a quiet evening at home.    Increase activity as tolerated.    Rev. 5/12

## 2020-08-26 NOTE — BRIEF OP NOTE
[facility], [place]    Brief Operative Note    Pre-operative diagnosis: Malignant neoplasm of overlapping sites of bladder (H) [C67.8]  Post-operative diagnosis Same as pre-operative diagnosis    Procedure: Procedure(s):  Cystoscopy with Bladder Biopsy, Left Ureteral Wash, Left ureteral Retrograde  Surgeon: Surgeon(s) and Role:     * Justin Henry MD - Primary     * Jen Reagan MD - Resident - Assisting  Anesthesia: Monitor Anesthesia Care   Estimated blood loss: Minimal  Drains: None  Specimens:   ID Type Source Tests Collected by Time Destination   1 :  Washings Ureter, Left CYTOLOGY NON GYN Justin Henry MD 8/26/2020  3:02 PM    A : Bladder Biopsies Tissue Bladder SURGICAL PATHOLOGY EXAM Justin Henry MD 8/26/2020  3:08 PM      Findings:   None.  Complications: None.  Implants: * No implants in log *    Jen Reagan MD  PGY-2 Urology

## 2020-08-27 LAB
COPATH REPORT: NORMAL
COPATH REPORT: NORMAL

## 2020-08-31 NOTE — RESULT ENCOUNTER NOTE
Ms. Oviedo,  Your bladder biopsy and urine cytology from your procedure was negative.  For now, I plan to have you back to clinic in 3 months for a cystoscopy and urine cytology.  Please let me know if you have any questions about this.  If it is ok with you, I will plan to cancel your clinic appointment in 2 weeks.  Thank you, Froilan Henry.

## 2020-09-02 ENCOUNTER — ANCILLARY PROCEDURE (OUTPATIENT)
Dept: CARDIOLOGY | Facility: CLINIC | Age: 85
End: 2020-09-02
Attending: PHYSICIAN ASSISTANT
Payer: MEDICARE

## 2020-09-03 ENCOUNTER — HOSPITAL ENCOUNTER (EMERGENCY)
Facility: CLINIC | Age: 85
Discharge: HOME | End: 2020-09-03
Attending: EMERGENCY MEDICINE | Admitting: EMERGENCY MEDICINE
Payer: MEDICARE

## 2020-09-03 VITALS — OXYGEN SATURATION: 95 % | HEART RATE: 68 BPM | DIASTOLIC BLOOD PRESSURE: 62 MMHG | SYSTOLIC BLOOD PRESSURE: 168 MMHG

## 2020-09-03 DIAGNOSIS — N39.0 URINARY TRACT INFECTION WITHOUT HEMATURIA, SITE UNSPECIFIED: ICD-10-CM

## 2020-09-03 LAB
ALBUMIN SERPL-MCNC: 3.6 G/DL (ref 3.4–5)
ALBUMIN UR-MCNC: NEGATIVE MG/DL
ALP SERPL-CCNC: 97 U/L (ref 40–150)
ALT SERPL W P-5'-P-CCNC: 20 U/L (ref 0–50)
ANION GAP SERPL CALCULATED.3IONS-SCNC: 4 MMOL/L (ref 3–14)
APPEARANCE UR: ABNORMAL
AST SERPL W P-5'-P-CCNC: 16 U/L (ref 0–45)
BASOPHILS # BLD AUTO: 0.1 10E9/L (ref 0–0.2)
BASOPHILS NFR BLD AUTO: 0.6 %
BILIRUB SERPL-MCNC: 0.4 MG/DL (ref 0.2–1.3)
BILIRUB UR QL STRIP: NEGATIVE
BUN SERPL-MCNC: 23 MG/DL (ref 7–30)
CALCIUM SERPL-MCNC: 9 MG/DL (ref 8.5–10.1)
CHLORIDE SERPL-SCNC: 104 MMOL/L (ref 94–109)
CO2 SERPL-SCNC: 30 MMOL/L (ref 20–32)
COLOR UR AUTO: ABNORMAL
CREAT SERPL-MCNC: 1.03 MG/DL (ref 0.52–1.04)
DIFFERENTIAL METHOD BLD: ABNORMAL
EOSINOPHIL # BLD AUTO: 0.1 10E9/L (ref 0–0.7)
EOSINOPHIL NFR BLD AUTO: 0.9 %
ERYTHROCYTE [DISTWIDTH] IN BLOOD BY AUTOMATED COUNT: 12.8 % (ref 10–15)
GFR SERPL CREATININE-BSD FRML MDRD: 49 ML/MIN/{1.73_M2}
GLUCOSE SERPL-MCNC: 93 MG/DL (ref 70–99)
GLUCOSE UR STRIP-MCNC: NEGATIVE MG/DL
HCT VFR BLD AUTO: 40.2 % (ref 35–47)
HGB BLD-MCNC: 13 G/DL (ref 11.7–15.7)
HGB UR QL STRIP: ABNORMAL
IMM GRANULOCYTES # BLD: 0.1 10E9/L (ref 0–0.4)
IMM GRANULOCYTES NFR BLD: 0.5 %
KETONES UR STRIP-MCNC: NEGATIVE MG/DL
LEUKOCYTE ESTERASE UR QL STRIP: ABNORMAL
LYMPHOCYTES # BLD AUTO: 1.4 10E9/L (ref 0.8–5.3)
LYMPHOCYTES NFR BLD AUTO: 12.4 %
MCH RBC QN AUTO: 31.9 PG (ref 26.5–33)
MCHC RBC AUTO-ENTMCNC: 32.3 G/DL (ref 31.5–36.5)
MCV RBC AUTO: 99 FL (ref 78–100)
MONOCYTES # BLD AUTO: 0.9 10E9/L (ref 0–1.3)
MONOCYTES NFR BLD AUTO: 8 %
NEUTROPHILS # BLD AUTO: 8.7 10E9/L (ref 1.6–8.3)
NEUTROPHILS NFR BLD AUTO: 77.6 %
NITRATE UR QL: NEGATIVE
NRBC # BLD AUTO: 0 10*3/UL
NRBC BLD AUTO-RTO: 0 /100
PH UR STRIP: 5.5 PH (ref 5–7)
PLATELET # BLD AUTO: 224 10E9/L (ref 150–450)
POTASSIUM SERPL-SCNC: 4.4 MMOL/L (ref 3.4–5.3)
PROT SERPL-MCNC: 7.5 G/DL (ref 6.8–8.8)
RBC # BLD AUTO: 4.08 10E12/L (ref 3.8–5.2)
RBC #/AREA URNS AUTO: 1 /HPF (ref 0–2)
SODIUM SERPL-SCNC: 138 MMOL/L (ref 133–144)
SOURCE: ABNORMAL
SP GR UR STRIP: 1 (ref 1–1.03)
SQUAMOUS #/AREA URNS AUTO: <1 /HPF (ref 0–1)
TRANS CELLS #/AREA URNS HPF: <1 /HPF (ref 0–1)
UROBILINOGEN UR STRIP-MCNC: NORMAL MG/DL (ref 0–2)
WBC # BLD AUTO: 11.2 10E9/L (ref 4–11)
WBC #/AREA URNS AUTO: >182 /HPF (ref 0–5)

## 2020-09-03 PROCEDURE — 81001 URINALYSIS AUTO W/SCOPE: CPT | Performed by: EMERGENCY MEDICINE

## 2020-09-03 PROCEDURE — 99283 EMERGENCY DEPT VISIT LOW MDM: CPT

## 2020-09-03 PROCEDURE — 85025 COMPLETE CBC W/AUTO DIFF WBC: CPT | Performed by: EMERGENCY MEDICINE

## 2020-09-03 PROCEDURE — 99284 EMERGENCY DEPT VISIT MOD MDM: CPT | Mod: Z6 | Performed by: EMERGENCY MEDICINE

## 2020-09-03 PROCEDURE — 80053 COMPREHEN METABOLIC PANEL: CPT | Performed by: EMERGENCY MEDICINE

## 2020-09-03 RX ORDER — CEPHALEXIN 500 MG/1
500 CAPSULE ORAL 4 TIMES DAILY
Qty: 28 CAPSULE | Refills: 0 | Status: SHIPPED | OUTPATIENT
Start: 2020-09-03 | End: 2020-09-10

## 2020-09-03 NOTE — ED PROVIDER NOTES
[place] EMERGENCY DEPARTMENT ([facility])  9/03/20    History     Chief Complaint   Patient presents with     UTI     HPI  Dimple Oviedo is a 87 year old female with a past medical history of urothelial cancer s/p right nephroureterectomy (11/20/2018) and chemotherapy, NSTEMI, and A. fib who presents to the Emergency department for evaluation of possible UTI.  Patient states that she has noticed cloudy urine since yesterday as well as sided low back pain.  She has had similar occurrences with UTIs in the past.  She denies fever chills or systemic symptoms.  No dysuria or difficulty with urination.  No other symptoms noted.     I have reviewed the Medications, Allergies, Past Medical and Surgical History, and Social History in the [employer] system.  PAST MEDICAL HISTORY:   Past Medical History:   Diagnosis Date     Calculus of kidney      Chemotherapy-induced neutropenia (H) 3/6/2019     Esophageal reflux      GERD (gastroesophageal reflux disease)      Hyperlipidemia LDL goal <130 5/9/2010     Malignant melanoma of skin of trunk, except scrotum (H)      Nonspecific abnormal finding     has living will 2004 -      Nontoxic multinodular goiter     no further eval /tx rec per pt     Osteopenia      Other psoriasis      Personal history of colonic polyps      PMR (polymyalgia rheumatica) (H)      Stress-induced cardiomyopathy      Undiagnosed cardiac murmurs      Unspecified constipation      Unspecified essential hypertension      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       PAST SURGICAL HISTORY:   Past Surgical History:   Procedure Laterality Date     BIOPSY       C NONSPECIFIC PROCEDURE  2005    colonoscopy polyp repeat 2010     COLONOSCOPY  2014     COMBINED CYSTOSCOPY, INSERT STENT URETER(S) Bilateral 9/12/2018    Procedure: COMBINED CYSTOSCOPY, INSERT STENT URETER(S);  Cystoscopy Bilateral ureteral Stent Placement.;  Surgeon: Justin Henry MD;  [facility]     COMBINED CYSTOSCOPY, RETROGRADES,  URETEROSCOPY, INSERT STENT Bilateral 4/3/2018    Procedure: COMBINED CYSTOSCOPY, RETROGRADES, URETEROSCOPY, INSERT STENT;;  Surgeon: Stuart King MD;  [facility]     COMBINED CYSTOSCOPY, RETROGRADES, URETEROSCOPY, INSERT STENT Bilateral 9/10/2018    Procedure: COMBINED CYSTOSCOPY, RETROGRADES, URETEROSCOPY, INSERT STENT;  Cystoscopy, Bilateral Ureteroscopy, Bladder Biopsies, Retrogram Pyelograms, Ureteral Washings and brushings, cysview;  Surgeon: Stuart King MD;  [facility]     COMBINED CYSTOSCOPY, RETROGRADES, URETEROSCOPY, INSERT STENT Left 8/26/2020    Procedure: Cystoscopy, Left Ureteral Wash, Left ureteral Retrograde Pyelogram;  Surgeon: Justin Henry MD;  [facility]     CYSTOSCOPY, BIOPSY BLADDER INSTILL OPTICAL AGENT N/A 4/3/2018    Procedure: CYSTOSCOPY, BIOPSY BLADDER INSTILL OPTICAL AGENT;  Cystoscopy, Blue Light Cystoscopy, Bladder Biopsies, Bilateral Selective ureteral washings for Cytology, Bilateral Retrograde Pyelograms, Bilateral Ureteroscopy;  Surgeon: Stuart King MD;  [facility]     CYSTOSCOPY, BIOPSY BLADDER, COMBINED N/A 2/19/2018    Procedure: COMBINED CYSTOSCOPY, BIOPSY BLADDER;  Cystoscopy, Bladder Biopsy;  Surgeon: Kenna La MD;  [facility]     CYSTOSCOPY, BIOPSY BLADDER, COMBINED N/A 8/26/2020    Procedure: Cystoscopy, biopsy bladder, combined;  Surgeon: Justin Henry MD;  [facility]     CYSTOSCOPY, FULGURATE BLADDER TUMOR, COMBINED N/A 1/13/2020    Procedure: CYSTOSCOPY, WITH  bladder biopsies and fulguration;  Surgeon: Stuart King MD;  [facility]     CYSTOSCOPY, REMOVE STENT(S), COMBINED  11/13/2018    Procedure: Flexible Cystoscopy with Stent Removal;  Surgeon: Stuart King MD;  [facility]     DAVINCI LYMPHADENECTOMY N/A 11/13/2018    Procedure: Davinci Lymphadenectomy ;  Surgeon: Stuart King MD;  [facility]     DAVINCI  NEPHROURETERECTOMY N/A 11/13/2018    Procedure: Right DaVinci Assisted Nephroureterectomy;  Surgeon: Stuart King MD;  [facility]     ENDOSCOPIC ULTRASOUND LOWER GASTROINTESTIONAL TRACT (GI) N/A 10/30/2015    Procedure: ENDOSCOPIC ULTRASOUND LOWER GASTROINTESTIONAL TRACT (GI);  Surgeon: Daniel Jean-Baptiste MD;  [facility]     EYE SURGERY  12/4/17     INSERT PORT VASCULAR ACCESS Right 12/19/2018    Procedure: Chest Port Placement - right;  Surgeon: Stuart Chavez PA-C;  [facility]     IR CHEST PORT PLACEMENT > 5 YRS OF AGE  12/19/2018     IR PORT REMOVAL RIGHT  6/26/2019     LAPAROSCOPIC CHOLECYSTECTOMY WITH CHOLANGIOGRAMS N/A 11/1/2015    Procedure: LAPAROSCOPIC CHOLECYSTECTOMY WITH CHOLANGIOGRAMS;  Surgeon: Tonie Warren MD;  [facility]     REMOVE PORT VASCULAR ACCESS Right 6/26/2019    Procedure: Right Port Removal;  Surgeon: Froilan Irizarry PA-C;  [facility]     SURGICAL HISTORY OF -   1996    malignant melanoma     SURGICAL HISTORY OF -   1968    thyroid nodule     SURGICAL HISTORY OF -       D & C       Past medical history, past surgical history, medications, and allergies were reviewed with the patient. Additional pertinent items: None    FAMILY HISTORY:   Family History   Problem Relation Age of Onset     Cancer Father         dec - esophageal and laryngeal     Heart Disease Mother      Respiratory Mother         dec     Breast Cancer Daughter      Other Cancer Daughter      Thyroid Disease Daughter      Asthma Daughter      Hyperlipidemia Son      Diabetes Son      Anesthesia Reaction No family hx of      Deep Vein Thrombosis (DVT) No family hx of        SOCIAL HISTORY:   Social History     Tobacco Use     Smoking status: Never Smoker     Smokeless tobacco: Never Used   Substance Use Topics     Alcohol use: No     Alcohol/week: 0.0 standard drinks     Comment: rare     Social history was reviewed with the patient. Additional pertinent items:  None      Patient's Medications   New Prescriptions    No medications on file   Previous Medications    ACETAMINOPHEN (TYLENOL) 650 MG CR TABLET    Take 1 tablet (650 mg) by mouth every 8 hours as needed for pain    ALENDRONATE (FOSAMAX) 70 MG TABLET    TAKE 1 TABLET EVERY 7 DAYS AT LEAST 60 MINUTES BEFORE BREAKFAST AS DIRECTED.    ASPIRIN 81 MG TABLET    Take 81 mg by mouth every evening PT last dose 8.19.2020    CALCIUM CITRATE-VITAMIN D (CALCIUM + D PO)    Take 1 tablet by mouth every morning     CYCLOSPORINE (RESTASIS) 0.05 % OPHTHALMIC EMULSION    Place 1 drop into both eyes 2 times daily    FERROUS SULFATE 325 (65 FE) MG TBEC EC TABLET    Take 325 mg by mouth every morning     FUROSEMIDE (LASIX) 20 MG TABLET    Take 1 tablet (20 mg) by mouth every morning    IRBESARTAN (AVAPRO) 300 MG TABLET    TAKE 1 TABLET EVERY DAY    LEVOFLOXACIN (LEVAQUIN) 500 MG TABLET    Take 1 tablet 6 hours post treatment and 1 tablet each AM following each treatment.    LOVASTATIN (MEVACOR) 40 MG TABLET    TAKE 1 TABLET AT BEDTIME (HYPERLIPIDEMIA LDL GOAL BELOW 130)    METHIMAZOLE (TAPAZOLE) 5 MG TABLET    Take 1 tablet (5 mg) by mouth daily    MULTIPLE VITAMINS-MINERALS (PRESERVISION AREDS 2 PO)    Take 1 tablet by mouth every morning    NITROGLYCERIN (NITROSTAT) 0.4 MG SUBLINGUAL TABLET    For chest pain place 1 tablet under the tongue every 5 minutes for 3 doses. If symptoms persist 5 minutes after 1st dose call 911.    OMEGA-3 FATTY ACIDS (FISH OIL) 500 MG CAPS    Take 1 capsule by mouth every morning     OMEPRAZOLE (PRILOSEC) 20 MG DR CAPSULE    TAKE 1 CAPSULE EVERY DAY    PROBIOTIC PRODUCT (PROBIOTIC ADVANCED PO)    Take 1 capsule by mouth every morning     PROPRANOLOL ER (INDERAL LA) 60 MG 24 HR CAPSULE    TAKE 1 CAPSULE EVERY MORNING    ROPINIROLE (REQUIP) 0.25 MG TABLET    TAKE 1 TABLET IN THE AFTERNOON AND TAKE 2 TABLETS EVERY NIGHT    SERTRALINE (ZOLOFT) 100 MG TABLET    TAKE 1 TABLET (100 MG) BY MOUTH EVERY MORNING     TRAZODONE (DESYREL) 50 MG TABLET    TAKE 1/2 TABLET AT BEDTIME   Modified Medications    No medications on file   Discontinued Medications    No medications on file          Allergies   Allergen Reactions     Codeine         Review of Systems  A complete review of systems was performed with pertinent positives and negatives noted in the HPI, and all other systems negative.    Physical Exam   BP: (!) 153/71      Physical Exam  Constitutional:       General: She is not in acute distress.     Appearance: She is well-developed. She is not diaphoretic.   HENT:      Head: Normocephalic and atraumatic.      Mouth/Throat:      Pharynx: No oropharyngeal exudate.   Eyes:      General: No scleral icterus.        Right eye: No discharge.         Left eye: No discharge.      Pupils: Pupils are equal, round, and reactive to light.   Neck:      Musculoskeletal: Normal range of motion and neck supple.   Cardiovascular:      Rate and Rhythm: Normal rate and regular rhythm.      Heart sounds: Normal heart sounds. No murmur. No friction rub. No gallop.    Pulmonary:      Effort: Pulmonary effort is normal. No respiratory distress.      Breath sounds: Normal breath sounds. No wheezing.   Chest:      Chest wall: No tenderness.   Abdominal:      General: Bowel sounds are normal. There is no distension.      Palpations: Abdomen is soft.      Tenderness: There is no abdominal tenderness.   Musculoskeletal: Normal range of motion.         General: No tenderness or deformity.   Skin:     General: Skin is warm and dry.      Coloration: Skin is not pale.      Findings: No erythema or rash.   Neurological:      Mental Status: She is alert and oriented to person, place, and time.      Cranial Nerves: No cranial nerve deficit.         ED Course        Procedures                           Results for orders placed or performed in visit on 01/30/20 (from the past 24 hour(s))   Echocardiogram Complete    Narrative     [id number]  [id number]  [id number]  [id number]^NAHUN^EZEQUIEL^[place]           [facility],[place]  Echocardiography Laboratory  [address] [place] [id number]     Name: QUIN VALDEZ  MRN: [id number]  : 1933  Study Date: 2020 02:38 PM  Age: 87 yrs  Gender: Female  Patient Location: [facility]  Reason For Study: Preop examination, Primary osteoarthritis of right knee,  Aortic  Ordering Physician: EZEQUIEL GARNICA  Referring Physician: LUIS FIELDS  Performed By: Damon Bahena     BSA: 1.6 m2  Height: 57 in  Weight: 163 lb  HR: 63  _____________________________________________________________________________  __        Procedure  Echocardiogram with two-dimensional, color and spectral Doppler performed.  _____________________________________________________________________________  __        Interpretation Summary  Global and regional left ventricular function is normal with an EF of 60-65%.  Grade II or moderate diastolic dysfunction.  The right ventricle is normal size.Global right ventricular function is  normal.  Moderate aortic stenosis is present. Mild aortic insufficiency is present.  This study was compared with the study from 2018. There has been no  change.  _____________________________________________________________________________  __        Left Ventricle  Global and regional left ventricular function is normal with an EF of 60-65%.  Left ventricular wall thickness is normal. Left ventricular size is normal.  Grade II or moderate diastolic dysfunction.     Right Ventricle  The right ventricle is normal size. Global right ventricular function is  normal.     Atria  Severe biatrial enlargement is present.     Mitral Valve  The mitral valve is normal. Trace mitral insufficiency is present.        Aortic Valve  Mild aortic valve calcification is present. Mild aortic insufficiency is  present. VCW 0.3 cm. Moderate aortic stenosis is present. The mean AoV  pressure  gradient is 23.2 mmHg. The peak aortic velocity is 3.1 m/sec.     Tricuspid Valve  The tricuspid valve is normal. Trace tricuspid insufficiency is present. The  right ventricular systolic pressure is approximated at 40.4 mmHg plus the  right atrial pressure.     Pulmonic Valve  The valve leaflets are not well visualized. Trace to mild pulmonic  insufficiency is present.     Vessels  Sinuses of Valsalva 2.7 cm. Ascending aorta 3.6 cm. Dilation of the inferior  vena cava is present with normal respiratory variation in diameter. IVC  diameter and respiratory changes fall into an intermediate range suggesting an  RA pressure of 8 mmHg.     Pericardium  No pericardial effusion is present.        Compared to Previous Study  This study was compared with the study from 2018 . There has been no  change.  _____________________________________________________________________________  __  MMode/2D Measurements & Calculations     IVSd: 0.79 cm  LVIDd: 4.8 cm  LVIDs: 3.1 cm  LVPWd: 0.93 cm  FS: 35.3 %  LV mass(C)d: 140.6 grams  LV mass(C)dI: 85.3 grams/m2  Ao root diam: 2.7 cm  asc Aorta Diam: 3.6 cm  LVOT diam: 1.9 cm  LVOT area: 2.8 cm2  LA Volume (BP): 103.0 ml  LA Volume Index (BP): 62.4 ml/m2  RWT: 0.38           Doppler Measurements & Calculations  MV E max rick: 105.0 cm/sec  MV A max rick: 80.5 cm/sec  MV E/A: 1.3  MV dec slope: 632.7 cm/sec2  MV dec time: 0.17 sec  Ao V2 max: 307.7 cm/sec  Ao max P.0 mmHg  Ao V2 mean: 232.1 cm/sec  Ao mean P.2 mmHg  Ao V2 VTI: 74.2 cm  PAUL(I,D): 1.2 cm2  PAUL(V,D): 1.1 cm2  LV V1 max P.9 mmHg  LV V1 max: 121.7 cm/sec  LV V1 VTI: 31.3 cm  SV(LVOT): 86.1 ml  SI(LVOT): 52.2 ml/m2  PA V2 max: 108.6 cm/sec  PA max P.7 mmHg  PA acc time: 0.13 sec  TR max rick: 317.6 cm/sec  TR max P.4 mmHg  AV Rick Ratio (DI): 0.40  PAUL Index (cm2/m2): 0.70  E/E' av.8  Lateral E/e': 11.4  Medial E/e': 22.1         _____________________________________________________________________________  __        Report approved by: Ade Lopez 09/02/2020 03:20 PM        *Note: Due to a large number of results and/or encounters for the requested time period, some results have not been displayed. A complete set of results can be found in Results Review.     Medications - No data to display          Assessments & Plan (with Medical Decision Making)   This is an 87-year-old female presents with cloudy urine as well as left-sided back pain.  She has had symptoms since yesterday.  She states this is consistent with previous UTIs.  She denies any other associated symptoms with this.  She does not have any suprapubic or CVA tenderness on exam.  UA shows likely UTI.  BC shows WBC count of 11.2.  I discussed all results with patient.  We will start patient on a course of antibiotics. Will discharge home with return precautions. Discussed reasons to return to the emergency department.  Patient understands and agrees with this plan.    I have reviewed the nursing notes.    I have reviewed the findings, diagnosis, plan and need for follow up with the patient.    New Prescriptions    No medications on file       Final diagnoses:   None       9/3/2020   [facility], [place], EMERGENCY DEPARTMENT     Brian Vaughan,   09/03/20 1726       Brian Vaughan, DO  09/10/20 1911

## 2020-09-03 NOTE — ED AVS SNAPSHOT
[facility], [place], Emergency Department  [address] [place] [id number]  Phone:  [phone]                                    Dimple Oviedo   MRN: [id number]    Department:  [facility], Emergency Department   Date of Visit:  9/3/2020           After Visit Summary Signature Page    I have received my discharge instructions, and my questions have been answered. I have discussed any challenges I see with this plan with the nurse or doctor.    ..........................................................................................................................................  Patient/Patient Representative Signature      ..........................................................................................................................................  Patient Representative Print Name and Relationship to Patient    ..................................................               ................................................  Date                                   Time    ..........................................................................................................................................  Reviewed by Signature/Title    ...................................................              ..............................................  Date                                               Time          22EPIC Rev 08/18

## 2020-09-03 NOTE — ED TRIAGE NOTES
Patient reports that she is having cloudy urine and urinary frequency. She states that she has had UTIs in the past with the same symptoms.

## 2020-09-04 ENCOUNTER — PATIENT OUTREACH (OUTPATIENT)
Dept: CARE COORDINATION | Facility: CLINIC | Age: 85
End: 2020-09-04

## 2020-09-04 NOTE — PROGRESS NOTES
Clinic Care Coordination Contact  Community Health Worker Initial Outreach    CHW Initial Information Gathering:  Referral Source: ED Follow-Up  Preferred Hospital: [facility]  [phone]  Current living arrangement:: Not Assessed  Informal Support system:: Children, Family  No PCP office visit in Past Year: No  Transportation means:: Family  CHW Additional Questions  If ED/Hospital discharge, follow-up appointment scheduled as recommended?: No  MyChart active?: Yes    Patient accepts CC: Not at this time. Pt appreciated the follow up call. Gave pt my phone number and encouraged her to reach out to me if anything comes up.

## 2020-09-11 ENCOUNTER — TELEPHONE (OUTPATIENT)
Dept: ORTHOPEDICS | Facility: CLINIC | Age: 85
End: 2020-09-11

## 2020-09-11 NOTE — TELEPHONE ENCOUNTER
[facility] Call Center    Phone Message    May a detailed message be left on voicemail: yes     Reason for Call: Other: Pt would like to discuss scheduling for knee replacement surgery. Would like to get surgery scheduled before next chemo round at end of November.      Action Taken: Message routed to:  [facility]    Travel Screening: Not Applicable

## 2020-09-11 NOTE — TELEPHONE ENCOUNTER
Attempted to return patient's call. Left voicemail with callback information.    We are waiting to schedule patient's surgery until we receive clearance from urology department. This writer sent a staff message to urology requesting and update and their input.

## 2020-09-14 ENCOUNTER — TELEPHONE (OUTPATIENT)
Dept: FAMILY MEDICINE | Facility: CLINIC | Age: 85
End: 2020-09-14

## 2020-09-14 ENCOUNTER — VIRTUAL VISIT (OUTPATIENT)
Dept: FAMILY MEDICINE | Facility: CLINIC | Age: 85
End: 2020-09-14
Payer: MEDICARE

## 2020-09-14 DIAGNOSIS — R30.0 DYSURIA: ICD-10-CM

## 2020-09-14 DIAGNOSIS — R30.0 DYSURIA: Primary | ICD-10-CM

## 2020-09-14 LAB
ALBUMIN UR-MCNC: NEGATIVE MG/DL
APPEARANCE UR: CLEAR
BILIRUB UR QL STRIP: NEGATIVE
COLOR UR AUTO: YELLOW
GLUCOSE UR STRIP-MCNC: NEGATIVE MG/DL
HGB UR QL STRIP: ABNORMAL
KETONES UR STRIP-MCNC: NEGATIVE MG/DL
LEUKOCYTE ESTERASE UR QL STRIP: ABNORMAL
NITRATE UR QL: NEGATIVE
PH UR STRIP: 5.5 PH (ref 5–7)
RBC #/AREA URNS AUTO: ABNORMAL /HPF
SOURCE: ABNORMAL
SP GR UR STRIP: <=1.005 (ref 1–1.03)
UROBILINOGEN UR STRIP-ACNC: 0.2 EU/DL (ref 0.2–1)
WBC #/AREA URNS AUTO: ABNORMAL /HPF

## 2020-09-14 PROCEDURE — 81001 URINALYSIS AUTO W/SCOPE: CPT | Performed by: PHYSICIAN ASSISTANT

## 2020-09-14 PROCEDURE — 87086 URINE CULTURE/COLONY COUNT: CPT | Performed by: PHYSICIAN ASSISTANT

## 2020-09-14 PROCEDURE — 99441 ZZC PHYSICIAN TELEPHONE EVALUATION 5-10 MIN: CPT | Mod: 95 | Performed by: PHYSICIAN ASSISTANT

## 2020-09-14 RX ORDER — NITROFURANTOIN 25; 75 MG/1; MG/1
100 CAPSULE ORAL 2 TIMES DAILY
Qty: 14 CAPSULE | Refills: 0 | Status: SHIPPED | OUTPATIENT
Start: 2020-09-14 | End: 2020-09-21

## 2020-09-14 NOTE — TELEPHONE ENCOUNTER
Reason for Call:  Other call back    Detailed comments: pt states she has a bladder infection, she had meds for 7 days but its back, would like either to do labs or renew meds.    Phone Number Patient can be reached at: Home number on file [phone] (home)    Best Time: asap    Can we leave a detailed message on this number? YES    Call taken on 9/14/2020 at 8:25 AM by Janette Stein

## 2020-09-14 NOTE — PROGRESS NOTES
"Dimple Oviedo is a 87 year old female who is being evaluated via a billable telephone visit.      The patient has been notified of following:     \"This telephone visit will be conducted via a call between you and your physician/provider. We have found that certain health care needs can be provided without the need for a physical exam.  This service lets us provide the care you need with a short phone conversation.  If a prescription is necessary we can send it directly to your pharmacy.  If lab work is needed we can place an order for that and you can then stop by our lab to have the test done at a later time.    Telephone visits are billed at different rates depending on your insurance coverage. During this emergency period, for some insurers they may be billed the same as an in-person visit.  Please reach out to your insurance provider with any questions.    If during the course of the call the physician/provider feels a telephone visit is not appropriate, you will not be charged for this service.\"     Patient has given verbal consent for Telephone visit?  Yes    What phone number would you like to be contacted at? Number on file    How would you like to obtain your AVS? [employer]    Subjective     Dimple Oviedo is a 87 year old female who presents via phone visit today for the following health issues:    HPI    Genitourinary - Female  Onset/Duration: 1 week in a half   Description:   Painful urination (Dysuria): YES           Frequency: YES  Blood in urine (Hematuria): no  Delay in urine (Hesitency): no  Intensity: moderate  Progression of Symptoms:  improving  Accompanying Signs & Symptoms:  Fever/chills: no  Flank pain: no  Nausea and vomiting: no  Vaginal symptoms: none  Abdominal/Pelvic Pain: no  History:   History of frequent UTI s: YES  History of kidney stones: YES  Sexually Active: no  Possibility of pregnancy: No  Precipitating or alleviating factors: treated for uti last week   Therapies tried and " outcome: antibiotics (cephalexin 4 times daily for 7 days) and still having symptoms    Patient finished oral antibiotic for bladder infection and thought symptoms ok for a few days; symptoms seemed to return just yesterday though.  History of kidney stone note in previous results.      Review of Systems   Constitutional, HEENT, cardiovascular, pulmonary, GI, , musculoskeletal, neuro, skin, endocrine and psych systems are negative, except as otherwise noted.       Objective          Vitals:  No vitals were obtained today due to virtual visit.    healthy, alert and no distress  PSYCH: Alert and oriented times 3; coherent speech, normal   rate and volume, able to articulate logical thoughts, able   to abstract reason, no tangential thoughts, no hallucinations   or delusions  Her affect is normal  RESP: No cough, no audible wheezing, able to talk in full sentences  Remainder of exam unable to be completed due to telephone visits    Results for orders placed or performed in visit on 09/14/20 (from the past 24 hour(s))   *UA reflex to Microscopic and Culture ([place] and [facility] (except [place] and [place])    Specimen: Midstream Urine   Result Value Ref Range    Color Urine Yellow     Appearance Urine Clear     Glucose Urine Negative NEG^Negative mg/dL    Bilirubin Urine Negative NEG^Negative    Ketones Urine Negative NEG^Negative mg/dL    Specific Gravity Urine <=1.005 1.003 - 1.035    Blood Urine Trace (A) NEG^Negative    pH Urine 5.5 5.0 - 7.0 pH    Protein Albumin Urine Negative NEG^Negative mg/dL    Urobilinogen Urine 0.2 0.2 - 1.0 EU/dL    Nitrite Urine Negative NEG^Negative    Leukocyte Esterase Urine Small (A) NEG^Negative    Source Midstream Urine    Urine Microscopic   Result Value Ref Range    WBC Urine 5-10 (A) OTO5^0 - 5 /HPF    RBC Urine O - 2 OTO2^O - 2 /HPF     *Note: Due to a large number of results and/or encounters for the requested time period, some results have not been displayed. A  "complete set of results can be found in Results Review.           Assessment/Plan:    Assessment & Plan     Dysuria  Given history will presumptive treat for UTI with slight signs of infection/wbc and possible bacteria in recent urinalysis. Encouraged patient to push fluids, may use Pyridium (urlistat/azo) and/or cranberry juice/pills OTC as directed. Discussed importance of good personal hygiene and frequent urination.  Call or return to clinic prn if these symptoms worsen or fail to improve as anticipated and follow up with PCP for routine care.   - nitroFURantoin macrocrystal-monohydrate (MACROBID) 100 MG capsule; Take 1 capsule (100 mg) by mouth 2 times daily for 7 days     BMI:   Estimated body mass index is 35.29 kg/m  as calculated from the following:    Height as of 8/26/20: 1.448 m (4' 9.01\").    Weight as of 8/26/20: 74 kg (163 lb 2.3 oz).   Weight management plan: Discussed healthy diet and exercise guidelines      Return in about 3 months (around 12/14/2020) for Routine visit, or sooner with worsening symptoms.    Michaela Guerrero PA-C  [facility]    Phone call duration:  10 minutes              "

## 2020-09-14 NOTE — RESULT ENCOUNTER NOTE
"Results discussed with patient at time of visit.  Presumptively treated with macrobid.  Michaela \"Chiki\" ROSANNE Guerrero"

## 2020-09-14 NOTE — TELEPHONE ENCOUNTER
Lab and virtual visit is OK but if it is easy for her - she should come to clinic and see provider.

## 2020-09-14 NOTE — TELEPHONE ENCOUNTER
I scheduled patient for a virtual visit with Chiki Guerrero at 1340 (140) this afternoon.  Patient was scheduled for a lab only visit at 1130.  Please sign order for UA/UC so it is completed prior to telephone visit.  Raina Al RN

## 2020-09-15 ENCOUNTER — TELEPHONE (OUTPATIENT)
Dept: SURGERY | Facility: CLINIC | Age: 85
End: 2020-09-15

## 2020-09-15 ENCOUNTER — PATIENT OUTREACH (OUTPATIENT)
Dept: UROLOGY | Facility: CLINIC | Age: 85
End: 2020-09-15

## 2020-09-15 LAB
BACTERIA SPEC CULT: NO GROWTH
SPECIMEN SOURCE: NORMAL

## 2020-09-15 NOTE — RESULT ENCOUNTER NOTE
"Juan Manuel Musa  Your attached urine culture did not actually grow any bacteria.  It's ok to go ahead and finish the current antibiotic to be safe though, but please let us know if symptoms don't improve completely after you finish it.    Please contact the office with any questions or concerns.    Michaela Wheatley \"Chiki\" ROSANNE Guerrero    "

## 2020-09-15 NOTE — TELEPHONE ENCOUNTER
Patient called inquiring about ECHO results. Results had been sent to another provider. Results are stable from most recent ECHO in 2018. Encouraged her to continue following with PCP and cardiologist as planned.  She states she has cardiology follow up 1/2021.

## 2020-09-16 ENCOUNTER — TELEPHONE (OUTPATIENT)
Dept: ORTHOPEDICS | Facility: CLINIC | Age: 85
End: 2020-09-16

## 2020-09-16 DIAGNOSIS — Z11.59 SPECIAL SCREENING EXAMINATION FOR VIRAL DISEASE: Primary | ICD-10-CM

## 2020-09-16 NOTE — TELEPHONE ENCOUNTER
Called pt and informed her that Lynn is out of the office today. Mentioned another RN will be reaching out to her, or Lynn will get back to you tomorrow. Pt agreed and appreciated notice.     Kiera Zavala ATC

## 2020-09-16 NOTE — TELEPHONE ENCOUNTER
[facility] Call Center    Phone Message    May a detailed message be left on voicemail: yes     Reason for Call: Other: Patient would like to speak to Lynn regarding her legs lymphedema. Ok tp leave vm if no answer      Action Taken: Message routed to:  [facility]: ortho and [facility]o    Travel Screening: Not Applicable

## 2020-09-17 ENCOUNTER — TELEPHONE (OUTPATIENT)
Dept: ORTHOPEDICS | Facility: CLINIC | Age: 85
End: 2020-09-17

## 2020-09-17 NOTE — TELEPHONE ENCOUNTER
Attempted to call patient back to discussed her compression stockings. Left message with callback information.

## 2020-09-17 NOTE — TELEPHONE ENCOUNTER
[facility] Call Center    Phone Message    May a detailed message be left on voicemail: yes     Reason for Call: Other: Pt would like to know is patient needs to be seen for her lymphedema before scheduling knee surgery. If so, can you help patient coordinate the care for lymphedema. Patient is available for call today between 1-3pm, and after 4 pm.      Action Taken: Message routed to:  [facility]o    Travel Screening: Not Applicable

## 2020-09-18 ENCOUNTER — TELEPHONE (OUTPATIENT)
Dept: ORTHOPEDICS | Facility: CLINIC | Age: 85
End: 2020-09-18

## 2020-09-18 NOTE — TELEPHONE ENCOUNTER
Attempted to reach out to patient to discuss scheduling surgery with Dr. Otero. Left detailed message that I have 11/9/20 available for a surgery date. Left best call back number of [phone]

## 2020-09-21 NOTE — TELEPHONE ENCOUNTER
Patient is scheduled for surgery with Dr. Otero      Spoke or left message with: Spoke with Dimple    Date of Surgery: 11/9/20    Location: [place]    Informed patient they will need an adult  Yes    Pre-op with surgeon (if applicable): Complete    H&P: Scheduled with PAC    Additional imaging/appointments: Patient will await call from covid scheduling team to schedule a covid test 4 days prior to surgery    Surgery packet: Given in clinic     Additional comments: patient will receive arrival time at PAC appointment

## 2020-09-22 ENCOUNTER — TELEPHONE (OUTPATIENT)
Dept: ORTHOPEDICS | Facility: CLINIC | Age: 85
End: 2020-09-22

## 2020-09-22 NOTE — TELEPHONE ENCOUNTER
[facility] Call Center    Phone Message    May a detailed message be left on voicemail: yes     Reason for Call: Other: Patient would like a c/b from Lynn to discuss previous messages. She would like to discuss via phone not MyChart. She is not available from 3-4.     Action Taken: Message routed to:  [facility]    Travel Screening: Not Applicable

## 2020-09-22 NOTE — TELEPHONE ENCOUNTER
FUTURE VISIT INFORMATION      SURGERY INFORMATION:    DOS 20 Right ARTHROPLASTY, KNEE, TOTAL    RECORDS REQUESTED FROM:       Primary Care Provider: Pop Zapien MD - [place]     Pertinent Medical History: hypertension, stress- induced cardiomyopathy, A-fib     Most recent EKG+ Tracin20     Most recent ECHO: 20     Most recent Coronary Angiogram: 17

## 2020-09-23 NOTE — TELEPHONE ENCOUNTER
Returned call to patient. Patient states that she has been having more issues with lymphedema and swelling in general. She has not followed up on her lymphedema in about a 1-1/2 years. Advised patient that she needs to see her cardiologist before her surgery to get their clearance as well. Patient calling the cardiologist today to get appointment. She will keep this clinic updated on her status and is aware that her surgery may need to be postponed.

## 2020-09-28 ENCOUNTER — TELEPHONE (OUTPATIENT)
Dept: FAMILY MEDICINE | Facility: CLINIC | Age: 85
End: 2020-09-28

## 2020-09-28 DIAGNOSIS — I89.0 LYMPHEDEMA OF BOTH LOWER EXTREMITIES: Primary | ICD-10-CM

## 2020-09-28 NOTE — TELEPHONE ENCOUNTER
----- Message from Bel Mckeon MD sent at 9/25/2020  4:02 PM CDT -----  Regarding: RE: Edema referral  Hi there,  I know I saw her a couple times for lymphedema back in 2018 but at this point any new orders should come from her PCP - Dr. Zapien.  Bel Mckeon MD   ----- Message -----  From: Estela Lares, PT  Sent: 9/23/2020   2:27 PM CDT  To: Bel Mckeon MD  Subject: Edema referral                                   Hi Dr. Mckeon, could you please place an order for Ms. Oviedo to visit the Lymphedema clinic.  She has left me voice mails and it looks like she also sent a [employer] message to you.  My partner worked with her about 2 years ago but we will need a new referral.      Thanks so much, Estela Lares PT,GCS, CLT-GRETA

## 2020-10-06 ENCOUNTER — HOSPITAL ENCOUNTER (OUTPATIENT)
Dept: PHYSICAL THERAPY | Facility: CLINIC | Age: 85
Setting detail: THERAPIES SERIES
End: 2020-10-06
Payer: MEDICARE

## 2020-10-06 DIAGNOSIS — I89.0 LYMPHEDEMA OF BOTH LOWER EXTREMITIES: ICD-10-CM

## 2020-10-06 PROCEDURE — 97535 SELF CARE MNGMENT TRAINING: CPT | Mod: GP | Performed by: PHYSICAL THERAPIST

## 2020-10-06 PROCEDURE — 97140 MANUAL THERAPY 1/> REGIONS: CPT | Mod: GP | Performed by: PHYSICAL THERAPIST

## 2020-10-06 PROCEDURE — 97162 PT EVAL MOD COMPLEX 30 MIN: CPT | Mod: GP | Performed by: PHYSICAL THERAPIST

## 2020-10-06 PROCEDURE — 97110 THERAPEUTIC EXERCISES: CPT | Mod: GP | Performed by: PHYSICAL THERAPIST

## 2020-10-06 NOTE — PROGRESS NOTES
10/06/20 1200   Quick Adds   Quick Adds Certification   Rehab Discipline   Discipline PT   Type of Visit   Type of visit Initial Edema Evaluation       present No   General Information   Start of care 10/06/20   Referring physician Dr. Pop Zaipen   Orders Evaluate and treat as indicated   Order date 09/28/20   Medical diagnosis Lymphedema of B LE   Edema onset 06/25/18   Affected body parts LLE;RLE   Edema etiology Chronic Venous Insufficiency   Edema etiology comments Pt presents with lipolymphedema   Pertinent history of current problem (PT: include personal factors and/or comorbidities that impact the POC; OT: include additional occupational profile info) Got worse a couple of months ago.  Pt has been waiting since February for a R TKA but she needed chemo therapy and then COVID caused delay. Pt is using compression socks during the day, some are about 2 years old but some are newer, these are harder to don so she does not wear as often. Pt does not like her velcro wraps or think they fit very well.  She wraps occasionally at night   Prior level of functional mobility Pt uses the walker most of the time because she feels her knee may buckle   Prior treatment Complete decongestive therapy   Community support Family / friend caregiver;Transportation service;Housekeeping service   Patient role / employment history Retired   Psychosocial concerns Impaired body image   Living environment Apartment / condo   Living environment comments Pt lives in senior co-op   Current assistive devices Front wheeled walker   General observations Pt arrived in her compression stockings that she got about 2 years ago when finishing her last CDT treatment.   Fall Risk Screen   Fall screen completed by PT   Have you fallen 2 or more times in the past year? No   Have you fallen and had an injury in the past year? No   Is patient a fall risk? No   System Outcome Measures   Lymphedema Life Impact Scale (score  "range 0-72). A higher score indicates greater impairment. 16   Subjective Report   Patient report of symptoms \"I have gotten fat, I have lumps all over\" Pt states that she is preparing for R TKA but wants to get her edema in check   Patient / Family Goals   Patient / family goals statement figure out what pt is going to do about her swelling after knee surgery when socks might be hard.   Pain   Patient currently in pain Yes   Pain location R knee   Pain description Ache   Pain description comment hurts mostly when walking   Pain comments Things in the knee click when walking and being afraid of buckling.    Cognitive Status   Orientation Orientation to person, place and time   Level of consciousness Alert   Follows commands and answers questions 100% of the time   Personal safety and judgement Intact   Memory Intact   Edema Exam / Assessment   Skin condition Pitting;Non-pitting;Intact;Venous distention   Skin condition comments Pt with puffy edema and mild pitting. L foot gets more swelling than the R.   Pitting 1+;2+   Pitting location Pre-tib 1+, L foot 2+   Scar No   Stemmer sign Positive;Negative   Stemmer sign comments positive L, negative R   Ulceration No   Girth Measurements   Girth Measurements Refer to separate girth measurement flowsheet   Volume LE   Right LE (mL) 3131.34   Left LE (mL) 3122.74   Range of Motion   ROM comments R knee does not extend fully,   Strength   Strength comments fair, no recent changes   Posture   Posture Forward head position   Activities of Daily Living   Activities of Daily Living independent   Bed Mobility   Bed mobility independent   Transfers   Transfers independent   Gait / Locomotion   Gait / Locomotion independent with her walker, will walk the hallways at her apartment 4x/day   Sensory   Sensory perception comments no deficits   Coordination   Coordination Gross motor coordination appropriate   Muscle Tone   Muscle tone No deficits were identified   Planned Edema " Interventions   Planned edema interventions Manual lymph drainage;Fit for compression garment;Exercises;Education;Home management program development;Skin care / precautions;Manual therapy;Precautions to prevent infection / exacerbation;Gradient compression bandaging   Clinical Impression   Criteria for skilled therapeutic intervention met Yes   Therapy diagnosis Lymphedema   Influenced by the following impairments / conditions Phlebolymphedema;Stage 2;Lipolymphedema   Functional limitations due to impairments / conditions fit of clothing, activity tolerance, risk of worsening with surgery, legs feel sensitive   Clinical Presentation Evolving/Changing   Clinical Presentation Rationale Swelling may be a little more than 2 years ago but pitting is mild. Pt is engaged in treatment as she does not want to worsen after surgery   Clinical Decision Making (Complexity) Moderate complexity   Treatment Frequency Other (see comments)   Treatment duration 6 visit (2 before and up to 4 after surgery) over 90 days   Patient / family and/or staff in agreement with plan of care Yes   Risks and benefits of therapy have been explained Yes   Goal 1   Goal identifier Home program   Goal description Pt will return to completing her home program daily including exercise and self massage.   Target date 11/04/20   Goal 2   Goal identifier Compression   Goal description Pt will be able to direct assist for appropriate compression plan following her TKA if unable to don stockings or wrap herself.   Target date 11/04/20   Goal 3   Goal identifier Maintenance   Goal description Pt will maintain R LE volume within 2% of current at post-op 6 weeks to allow her to progress well with her TKA rehab.     Target date 01/03/21   Total Evaluation Time   PT Eval, Moderate Complexity Minutes (17687) 16   Certification   Certification date from 10/06/20   Certification date to 01/03/21   Medical Diagnosis Lymphedema

## 2020-10-06 NOTE — PROGRESS NOTES
[facility]        OUTPATIENT PHYSICAL THERAPY EDEMA EVALUATION  PLAN OF TREATMENT FOR OUTPATIENT REHABILITATION  (COMPLETE FOR INITIAL CLAIMS ONLY)  Patient's Last Name, First Name, Dimple Berkowitz                           Provider s Name:   [facility] Medical Record No.  [id number]     Start of Care Date:  10/06/20   Onset Date:  06/25/18   Type:  PT   Medical Diagnosis:  Lymphedema   Therapy Diagnosis:  Lymphedema Visits from SOC:  1                                     __________________________________________________________________________________   Plan of Treatment/Functional Goals:    Manual lymph drainage, Fit for compression garment, Exercises, Education, Home management program development, Skin care / precautions, Manual therapy, Precautions to prevent infection / exacerbation, Gradient compression bandaging        GOALS  1. Goal description: Pt will return to completing her home program daily including exercise and self massage.       Target date: 11/04/20  2. Goal description: Pt will be able to direct assist for appropriate compression plan following her TKA if unable to don stockings or wrap herself.       Target date: 11/04/20  3. Goal description: Pt will maintain R LE volume within 2% of current at post-op 6 weeks to allow her to progress well with her TKA rehab.         Target date: 01/03/21              Treatment Frequency: see below   Treatment duration: 6 visit (2 before and up to 4 after surgery) over 90 days    Estela Lares, PT                                    I CERTIFY THE NEED FOR THESE SERVICES FURNISHED UNDER        THIS PLAN OF TREATMENT AND WHILE UNDER MY CARE     (Physician co-signature of this document indicates review and certification of the therapy plan).                   Certification date from: 10/06/20        Certification date to: 01/03/21           Referring physician: Dr. Pop Zapien   Initial Assessment  See Epic Evaluation- Start of care: 10/06/20

## 2020-10-07 ENCOUNTER — PREP FOR PROCEDURE (OUTPATIENT)
Dept: ORTHOPEDICS | Facility: CLINIC | Age: 85
End: 2020-10-07

## 2020-10-07 DIAGNOSIS — M17.11 PRIMARY OSTEOARTHRITIS OF RIGHT KNEE: Primary | ICD-10-CM

## 2020-10-08 ENCOUNTER — VIRTUAL VISIT (OUTPATIENT)
Dept: CARDIOLOGY | Facility: CLINIC | Age: 85
End: 2020-10-08
Attending: INTERNAL MEDICINE
Payer: MEDICARE

## 2020-10-08 DIAGNOSIS — Z91.199 NO-SHOW FOR APPOINTMENT: Primary | ICD-10-CM

## 2020-10-08 PROCEDURE — 99207 PR NO CHARGE LOS: CPT | Performed by: INTERNAL MEDICINE

## 2020-10-08 ASSESSMENT — PAIN SCALES - GENERAL: PAINLEVEL: NO PAIN (0)

## 2020-10-08 NOTE — LETTER
10/8/2020      RE: Dimple Oviedo  [address]  [place] [address]       Dear Colleague,    Thank you for the opportunity to participate in the care of your patient, Dimple Oviedo, at the [facility]. Please see a copy of my visit note below.          Patient was not reachable.    SHASHANK GarciaN, RN, PHN  Electrophysiology Nurse Coordinator          Please do not hesitate to contact me if you have any questions/concerns.     Sincerely,     Butch Griffith MD

## 2020-10-09 DIAGNOSIS — I87.303 STASIS EDEMA OF BOTH LOWER EXTREMITIES: ICD-10-CM

## 2020-10-09 PROBLEM — M17.11 PRIMARY OSTEOARTHRITIS OF RIGHT KNEE: Status: ACTIVE | Noted: 2020-10-09

## 2020-10-10 DIAGNOSIS — Z11.59 ENCOUNTER FOR SCREENING FOR OTHER VIRAL DISEASES: Primary | ICD-10-CM

## 2020-10-13 RX ORDER — FUROSEMIDE 20 MG
20 TABLET ORAL EVERY MORNING
Qty: 90 TABLET | Refills: 3 | Status: SHIPPED | OUTPATIENT
Start: 2020-10-13 | End: 2021-10-05

## 2020-10-13 NOTE — TELEPHONE ENCOUNTER
Routing refill request to provider for review/approval because:  --Last blood pressure on 9/3/2020 elevated in ED visit.  --Patient looks due for chronic care visit but I am not sure if provider wants her in at this time.  --Two acute care visits in the last year.        --Last visit:  9/14/2020 acute care.  --Future Office Visit:  NONE          BP Readings from Last 6 Encounters:   09/03/20 (!) 168/62   08/26/20 134/55   08/24/20 137/78   07/20/20 (!) 167/78   06/03/20 (!) 151/75   05/27/20 (!) (P) 166/81

## 2020-10-14 ENCOUNTER — VIRTUAL VISIT (OUTPATIENT)
Dept: CARDIOLOGY | Facility: CLINIC | Age: 85
End: 2020-10-14
Attending: INTERNAL MEDICINE
Payer: MEDICARE

## 2020-10-14 DIAGNOSIS — I48.0 PAROXYSMAL ATRIAL FIBRILLATION (H): Primary | ICD-10-CM

## 2020-10-14 DIAGNOSIS — I42.9 SECONDARY CARDIOMYOPATHY (H): ICD-10-CM

## 2020-10-14 PROCEDURE — 99443 PR PHYSICIAN TELEPHONE EVALUATION 21-30 MIN: CPT | Mod: 95 | Performed by: INTERNAL MEDICINE

## 2020-10-14 NOTE — LETTER
"10/14/2020      RE: Dimple Oviedo  [address]  [facility] [address]       Dear Colleague,    Thank you for the opportunity to participate in the care of your patient, Dimple Oviedo, at the [facility] HEART [facility] at [facility]. Please see a copy of my visit note below.    Dimple Oviedo is a 87 year old female who is being evaluated via a billable telephone visit.      The patient has been notified of following:     \"This telephone visit will be conducted via a call between you and your physician/provider. We have found that certain health care needs can be provided without the need for a physical exam.  This service lets us provide the care you need with a short phone conversation.  If a prescription is necessary we can send it directly to your pharmacy.  If lab work is needed we can place an order for that and you can then stop by our lab to have the test done at a later time.    Telephone visits are billed at different rates depending on your insurance coverage. During this emergency period, for some insurers they may be billed the same as an in-person visit.  Please reach out to your insurance provider with any questions.    If during the course of the call the physician/provider feels a telephone visit is not appropriate, you will not be charged for this service.\"    Patient has given verbal consent for Telephone visit?  Yes    What phone number would you like to be contacted at? [phone]    How would you like to obtain your AVS? Paulina    HPI:   Mrs. Oviedo is an 87-year-old woman who has a complex past medical history including bladder malignancy, kidney removal, and hypertension, GERD and history of atrial fibrillation with rapid rates.  At one point she also had a stress induced cardiomyopathy with diminished ejection fraction but that has resolved.  Her echocardiogram in September 2018 showed normal left ventricular dimensions " with an ejection fraction of 30 to 35%.  Subsequently however the ejection fraction has improved and her last echo in September 2020 reveals normal ejection fraction of about 60%.    Since our last visit Mrs. Oviedo indicates that she has been feeling well and does not recall having any recent episodes of atrial fibrillation.  She is not currently anticoagulated since an attempt to initiate his rivaroxaban in 2018 did result in a bleeding problem from the bladder.  Now, she has concluded most of her bladder treatment although she thinks that there may be some additional chemo yet in the future.  In addition, she has been having knee issues and is scheduled to have knee replacement in November of this year.  Consequently, there does not appear to be any reason to start anticoagulation now although I did explain to Dimple and her daughter who was on the line at the same time that we would try again with anticoagulation after her orthopedic and bladder treatments were completed.  We will meet again about a month after her knee surgery to discuss the role of anticoagulation.    At today's visit Dimple is without any complaints suggesting angina.  She did have an coronary angiogram in 2018 that did not reveal any obstructive coronary disease.  Similarly, she is currently without any heart failure related symptoms.  She does have lymphedema but this does not appear to be related to heart failure in view of the absence of any other respiratory symptoms to suggest that diagnosis.  However further evaluation may be warranted should her symptoms evolve.    In terms of her operative risk, apart from her age and history of hypertension and atrial fibrillation, she should be a suitable risk for the necessary orthopedic procedure.  It is my understanding that this will be undertaken by Dr. Otero at [facility].    Today I addressed in particular the daughters queries regarding the interpretation of her mother's recent  echocardiogram.  I also explained the rationale for considering anticoagulation but point pointed out that we would need to hold it in any event for her surgery so that its best we considered after her operative procedures.  Patient and daughter indicated understanding and agreement.      Last Clinic Note 2018  85 year old female with a past medical history including HTN, multinodular goiter, GERD, hyperthyroidism, history of atrial fibrillation with RVR, and recent stress cardiomyopathy with recovered EF. Presents to clinic for CORE follow-up. She presented to [facility] 12/13 with persistent chest pain and shortness of breath, and was found to have an elevated troponin. EKG negative for ischemic changes. Chest CT negative for PE, but showed bilateral pleural effusions. Echo showed normal LV size with mild LVH, EF 30-35%, and global akinesis with sparing of the basal segments. Coronary angiogram revealed no obstructive CAD.  Findings all suggested stress-induced cardiomyopathy, so she was discharged on BB, ARB, statin, and aspirin. She was then admitted overnight on 1/16 for palpitations and was found to be in afib with RVR in the setting of volume overload and ongoing hyperthyroidism (though free t4 was normal that admission). She converted to sinus spontaneously    PAST MEDICAL HISTORY:  Past Medical History:   Diagnosis Date     Calculus of kidney      Chemotherapy-induced neutropenia (H) 3/6/2019     Esophageal reflux      GERD (gastroesophageal reflux disease)      Hyperlipidemia LDL goal <130 5/9/2010     Malignant melanoma of skin of trunk, except scrotum (H)      Nonspecific abnormal finding     has living will 2004 -      Nontoxic multinodular goiter     no further eval /tx rec per pt     Osteopenia      Other psoriasis      Personal history of colonic polyps      PMR (polymyalgia rheumatica) (H)      Stress-induced cardiomyopathy      Undiagnosed cardiac murmurs      Unspecified constipation      Unspecified  essential hypertension      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 (Patient taking differently: Take 70 mg by mouth every 7 days TAKE 1 TABLET EVERY 7 DAYS AT LEAST 60 MINUTES BEFORE BREAKFAST AS DIRECTED.) 12 tablet 3     aspirin 81 MG tablet Take 81 mg by mouth every evening PT last dose 8.19.2020       Calcium Citrate-Vitamin D (CALCIUM + D PO) Take 1 tablet by mouth every morning        cycloSPORINE (RESTASIS) 0.05 % ophthalmic emulsion Place 1 drop into both eyes 2 times daily       ferrous sulfate 325 (65 Fe) MG TBEC EC tablet Take 325 mg by mouth every morning        furosemide (LASIX) 20 MG tablet TAKE 1 TABLET (20 MG) BY MOUTH EVERY MORNING 90 tablet 3     irbesartan (AVAPRO) 300 MG tablet TAKE 1 TABLET EVERY DAY (Patient taking differently: Take 300 mg by mouth every morning TAKE 1 TABLET EVERY DAY) 90 tablet 3     levofloxacin (LEVAQUIN) 500 MG tablet Take 1 tablet 6 hours post treatment and 1 tablet each AM following each treatment. 6 tablet 6     lovastatin (MEVACOR) 40 MG tablet TAKE 1 TABLET AT BEDTIME (HYPERLIPIDEMIA LDL GOAL BELOW 130) 90 tablet 0     methimazole (TAPAZOLE) 5 MG tablet Take 1 tablet (5 mg) by mouth daily (Patient taking differently: Take 5 mg by mouth every morning ) 90 tablet 3     Multiple Vitamins-Minerals (PRESERVISION AREDS 2 PO) Take 1 tablet by mouth every morning       nitroGLYcerin (NITROSTAT) 0.4 MG sublingual tablet For chest pain place 1 tablet under the tongue every 5 minutes for 3 doses. If symptoms persist 5 minutes after 1st dose call 911. 25 tablet 3      Omega-3 Fatty Acids (FISH OIL) 500 MG CAPS Take 1 capsule by mouth every morning        omeprazole (PRILOSEC) 20 MG DR capsule TAKE 1 CAPSULE EVERY DAY (Patient taking differently: Take 20 mg by mouth every morning TAKE 1 CAPSULE EVERY DAY) 90 capsule 3     Probiotic Product (PROBIOTIC ADVANCED PO) Take 1 capsule by mouth every morning        propranolol ER (INDERAL LA) 60 MG 24 hr capsule TAKE 1 CAPSULE EVERY MORNING 90 capsule 1     rOPINIRole (REQUIP) 0.25 MG tablet TAKE 1 TABLET IN THE AFTERNOON AND TAKE 2 TABLETS EVERY NIGHT 270 tablet 1     sertraline (ZOLOFT) 100 MG tablet TAKE 1 TABLET (100 MG) BY MOUTH EVERY MORNING 90 tablet 2     traZODone (DESYREL) 50 MG tablet TAKE 1/2 TABLET AT BEDTIME 45 tablet 2       PAST SURGICAL HISTORY:  Past Surgical History:   Procedure Laterality Date     BIOPSY       COLONOSCOPY  2014     COMBINED CYSTOSCOPY, INSERT STENT URETER(S) Bilateral 9/12/2018    Procedure: COMBINED CYSTOSCOPY, INSERT STENT URETER(S);  Cystoscopy Bilateral ureteral Stent Placement.;  Surgeon: Justin Henry MD;  [facility]     COMBINED CYSTOSCOPY, RETROGRADES, URETEROSCOPY, INSERT STENT Bilateral 4/3/2018    Procedure: COMBINED CYSTOSCOPY, RETROGRADES, URETEROSCOPY, INSERT STENT;;  Surgeon: Stuart King MD;  [facility]     COMBINED CYSTOSCOPY, RETROGRADES, URETEROSCOPY, INSERT STENT Bilateral 9/10/2018    Procedure: COMBINED CYSTOSCOPY, RETROGRADES, URETEROSCOPY, INSERT STENT;  Cystoscopy, Bilateral Ureteroscopy, Bladder Biopsies, Retrogram Pyelograms, Ureteral Washings and brushings, cysview;  Surgeon: Stuart King MD;  [facility]     COMBINED CYSTOSCOPY, RETROGRADES, URETEROSCOPY, INSERT STENT Left 8/26/2020    Procedure: Cystoscopy, Left Ureteral Wash, Left ureteral Retrograde Pyelogram;  Surgeon: Justin Henry MD;  [facility]     CYSTOSCOPY, BIOPSY BLADDER INSTILL OPTICAL AGENT N/A 4/3/2018    Procedure: CYSTOSCOPY, BIOPSY BLADDER  INSTILL OPTICAL AGENT;  Cystoscopy, Blue Light Cystoscopy, Bladder Biopsies, Bilateral Selective ureteral washings for Cytology, Bilateral Retrograde Pyelograms, Bilateral Ureteroscopy;  Surgeon: Stuart King MD;  [facility]     CYSTOSCOPY, BIOPSY BLADDER, COMBINED N/A 2/19/2018    Procedure: COMBINED CYSTOSCOPY, BIOPSY BLADDER;  Cystoscopy, Bladder Biopsy;  Surgeon: Kenna La MD;  [facility]     CYSTOSCOPY, BIOPSY BLADDER, COMBINED N/A 8/26/2020    Procedure: Cystoscopy, biopsy bladder, combined;  Surgeon: Justin Henry MD;  [facility]     CYSTOSCOPY, FULGURATE BLADDER TUMOR, COMBINED N/A 1/13/2020    Procedure: CYSTOSCOPY, WITH  bladder biopsies and fulguration;  Surgeon: Stuart King MD;  [facility]     CYSTOSCOPY, REMOVE STENT(S), COMBINED  11/13/2018    Procedure: Flexible Cystoscopy with Stent Removal;  Surgeon: Stuart King MD;  [facility]     DAVINCI LYMPHADENECTOMY N/A 11/13/2018    Procedure: Davinci Lymphadenectomy ;  Surgeon: Stuart King MD;  [facility]     DAVINCI NEPHROURETERECTOMY N/A 11/13/2018    Procedure: Right DaVinci Assisted Nephroureterectomy;  Surgeon: Stuart King MD;  [facility]     ENDOSCOPIC ULTRASOUND LOWER GASTROINTESTIONAL TRACT (GI) N/A 10/30/2015    Procedure: ENDOSCOPIC ULTRASOUND LOWER GASTROINTESTIONAL TRACT (GI);  Surgeon: Daniel Jean-Baptiste MD;  [facility]     EYE SURGERY  12/4/17     INSERT PORT VASCULAR ACCESS Right 12/19/2018    Procedure: Chest Port Placement - right;  Surgeon: Stuart Chavez PA-C;  [facility]     IR CHEST PORT PLACEMENT > 5 YRS OF AGE  12/19/2018     IR PORT REMOVAL RIGHT  6/26/2019     LAPAROSCOPIC CHOLECYSTECTOMY WITH CHOLANGIOGRAMS N/A 11/1/2015    Procedure: LAPAROSCOPIC CHOLECYSTECTOMY WITH CHOLANGIOGRAMS;  Surgeon: Tonie Warren MD;  [facility]     REMOVE PORT VASCULAR ACCESS Right 6/26/2019     Procedure: Right Port Removal;  Surgeon: Froilan Irizarry PA-C;  [facility]     SURGICAL HISTORY OF -   1996    malignant melanoma     SURGICAL HISTORY OF -   1968    thyroid nodule     SURGICAL HISTORY OF -       D & C     Z NONSPECIFIC PROCEDURE  2005    colonoscopy polyp repeat 2010       ALLERGIES:     Allergies   Allergen Reactions     Codeine        FAMILY HISTORY:  Family History   Problem Relation Age of Onset     Cancer Father         dec - esophageal and laryngeal     Heart Disease Mother      Respiratory Mother         dec     Breast Cancer Daughter      Other Cancer Daughter      Thyroid Disease Daughter      Asthma Daughter      Hyperlipidemia Son      Diabetes Son      Anesthesia Reaction No family hx of      Deep Vein Thrombosis (DVT) No family hx of          SOCIAL HISTORY:  Social History     Tobacco Use     Smoking status: Never Smoker     Smokeless tobacco: Never Used   Substance Use Topics     Alcohol use: No     Alcohol/week: 0.0 standard drinks     Comment: rare     Drug use: No       ROS:   Constitutional: No fever, chills, or sweats. Weight stable.   ENT: No visual disturbance, ear ache, epistaxis, sore throat.   Cardiovascular: As per HPI.   Respiratory: No cough, hemoptysis.    GI: No nausea, vomiting, hematemesis,   : No hematuria currently but patient may still require additional bladder chemo.  Integument: Negative.   Psychiatric: Negative.   Hematologic:   no easy bleeding.  Neuro: Negative.   Endocrinology: No significant heat or cold intolerance   Musculoskeletal: No myalgia.    Exam:  There were no vitals taken for this visit.    RESPIRATORY:no rales, rhonchi or wheezes, no use of accessory muscles, no retractions, respirations are unlabored, normal respiratory rate  NEURO: alert and oriented to person/place/time, normal speech, gait and affect  PSYCH: No abnormality    Labs:  CBC RESULTS:   Lab Results   Component Value Date    WBC 11.2 (H) 09/03/2020    RBC 4.08  09/03/2020    HGB 13.0 09/03/2020    HCT 40.2 09/03/2020    MCV 99 09/03/2020    MCH 31.9 09/03/2020    MCHC 32.3 09/03/2020    RDW 12.8 09/03/2020     09/03/2020       BMP RESULTS:  Lab Results   Component Value Date     09/03/2020    POTASSIUM 4.4 09/03/2020    CHLORIDE 104 09/03/2020    CO2 30 09/03/2020    ANIONGAP 4 09/03/2020    GLC 93 09/03/2020    BUN 23 09/03/2020    CR 1.03 09/03/2020    GFRESTIMATED 49 (L) 09/03/2020    GFRESTBLACK 57 (L) 09/03/2020    SHREYA 9.0 09/03/2020        INR RESULTS:  Lab Results   Component Value Date    INR 0.95 07/20/2020    INR 0.99 06/26/2019    INR 0.97 02/07/2019    INR 0.91 12/19/2018       Procedures:  PULMONARY FUNCTION TESTS:   No flowsheet data found.      ECHOCARDIOGRAM:   Interpretation Summary   9/2020  Global and regional left ventricular function is normal with an EF of 60-65%.  Grade II or moderate diastolic dysfunction.  The right ventricle is normal size.Global right ventricular function is  normal.  Moderate aortic stenosis is present. Mild aortic insufficiency is present.  This study was compared with the study from 09/11/2018. There has been no  change.    Assessment and Plan:   1.  Paroxysmal atrial fibrillation under good symptomatic control currently with beta-blocker alone.  2.  Patient is not anticoagulated due to a bladder bleeding issue when started on rivaroxaban in 2018.  We will low reconsider following that the planned surgery as discussed above.  3.  Multiple comorbidities including hypertension, previous stress cardiomyopathy now resolved, bladder malignancy and planned knee surgery.    Plan  1.  As noted above patient is a moderate risk for knee surgery but would seem to have adequate cardiac status to undergo a necessary procedure  2.  Plan for clinic visit aboot 4 to 6 weeks following surgery to discuss anticoagulation.  3.  No change in medication at present time    Telephone on 235pm ; telephone off 258pm; elapsed    I very much  appreciated the opportunity to see and assess Dimple Oviedo in the clinic today. Please do not hesitate to contact my office if you have any questions or concerns.      Butch Griffith MD  Cardiac Arrhythmia Service  [facility]  [phone]        Please do not hesitate to contact me if you have any questions/concerns.     Sincerely,     Butch Griffith MD

## 2020-10-14 NOTE — TELEPHONE ENCOUNTER
FUTURE VISIT INFORMATION        SURGERY INFORMATION:    DOS 20 Right ARTHROPLASTY, KNEE, TOTAL     RECORDS REQUESTED FROM:         Primary Care Provider: Pop Zapien MD - [place]     Pertinent Medical History: hypertension, stress- induced cardiomyopathy, A-fib     Most recent EKG+ Tracin20     Most recent ECHO: 20     Most recent Coronary Angiogram: 17

## 2020-10-14 NOTE — PATIENT INSTRUCTIONS
You were seen in the Electrophysiology Clinic today by: Butch Griffith MD    Plan:     Follow-up in 4-6 weeks with Dr. Griffith.    Your Care Team:  [facility] Cardiology   Telephone Number     Mily Moser RN [phone]     For scheduling appts or procedures:    Lenora Ramos   [phone]   For the Device Clinic (Pacemakers, ICDs, Loop Recorders)    During business hours: [phone]  After business hours:   [phone]- select option 4 and ask for job code [phone].       Cardiovascular Clinic:   [address] [place] [address]. [place] [address]      As always, Thank you for trusting us with your health care needs!

## 2020-10-14 NOTE — PROGRESS NOTES
"Dimple Ovieod is a 87 year old female who is being evaluated via a billable telephone visit.      The patient has been notified of following:     \"This telephone visit will be conducted via a call between you and your physician/provider. We have found that certain health care needs can be provided without the need for a physical exam.  This service lets us provide the care you need with a short phone conversation.  If a prescription is necessary we can send it directly to your pharmacy.  If lab work is needed we can place an order for that and you can then stop by our lab to have the test done at a later time.    Telephone visits are billed at different rates depending on your insurance coverage. During this emergency period, for some insurers they may be billed the same as an in-person visit.  Please reach out to your insurance provider with any questions.    If during the course of the call the physician/provider feels a telephone visit is not appropriate, you will not be charged for this service.\"    Patient has given verbal consent for Telephone visit?  Yes    What phone number would you like to be contacted at? [phone]    [facility]? [facility]    HPI:   Mrs. Oviedo is an 87-year-old woman who has a complex past medical history including bladder malignancy, kidney removal, and hypertension, GERD and history of atrial fibrillation with rapid rates.  At one point she also had a stress induced cardiomyopathy with diminished ejection fraction but that has resolved.  Her echocardiogram in September 2018 showed normal left ventricular dimensions with an ejection fraction of 30 to 35%.  Subsequently however the ejection fraction has improved and her last echo in September 2020 reveals normal ejection fraction of about 60%.    Since our last visit Mrs. Oviedo indicates that she has been feeling well and does not recall having any recent episodes of atrial fibrillation.  She is not currently " anticoagulated since an attempt to initiate his rivaroxaban in 2018 did result in a bleeding problem from the bladder.  Now, she has concluded most of her bladder treatment although she thinks that there may be some additional chemo yet in the future.  In addition, she has been having knee issues and is scheduled to have knee replacement in November of this year.  Consequently, there does not appear to be any reason to start anticoagulation now although I did explain to Dimple and her daughter who was on the line at the same time that we would try again with anticoagulation after her orthopedic and bladder treatments were completed.  We will meet again about a month after her knee surgery to discuss the role of anticoagulation.    At today's visit Dimple is without any complaints suggesting angina.  She did have an coronary angiogram in 2018 that did not reveal any obstructive coronary disease.  Similarly, she is currently without any heart failure related symptoms.  She does have lymphedema but this does not appear to be related to heart failure in view of the absence of any other respiratory symptoms to suggest that diagnosis.  However further evaluation may be warranted should her symptoms evolve.    In terms of her operative risk, apart from her age and history of hypertension and atrial fibrillation, she should be a suitable risk for the necessary orthopedic procedure.  It is my understanding that this will be undertaken by Dr. Otero at [facility].    Today I addressed in particular the daughters queries regarding the interpretation of her mother's recent echocardiogram.  I also explained the rationale for considering anticoagulation but point pointed out that we would need to hold it in any event for her surgery so that its best we considered after her operative procedures.  Patient and daughter indicated understanding and agreement.      Last Clinic Note 2018  85 year old female with a past medical  history including HTN, multinodular goiter, GERD, hyperthyroidism, history of atrial fibrillation with RVR, and recent stress cardiomyopathy with recovered EF. Presents to clinic for CORE follow-up. She presented to [facility] 12/13 with persistent chest pain and shortness of breath, and was found to have an elevated troponin. EKG negative for ischemic changes. Chest CT negative for PE, but showed bilateral pleural effusions. Echo showed normal LV size with mild LVH, EF 30-35%, and global akinesis with sparing of the basal segments. Coronary angiogram revealed no obstructive CAD.  Findings all suggested stress-induced cardiomyopathy, so she was discharged on BB, ARB, statin, and aspirin. She was then admitted overnight on 1/16 for palpitations and was found to be in afib with RVR in the setting of volume overload and ongoing hyperthyroidism (though free t4 was normal that admission). She converted to sinus spontaneously    PAST MEDICAL HISTORY:  Past Medical History:   Diagnosis Date     Calculus of kidney      Chemotherapy-induced neutropenia (H) 3/6/2019     Esophageal reflux      GERD (gastroesophageal reflux disease)      Hyperlipidemia LDL goal <130 5/9/2010     Malignant melanoma of skin of trunk, except scrotum (H)      Nonspecific abnormal finding     has living will 2004 -      Nontoxic multinodular goiter     no further eval /tx rec per pt     Osteopenia      Other psoriasis      Personal history of colonic polyps      PMR (polymyalgia rheumatica) (H)      Stress-induced cardiomyopathy      Undiagnosed cardiac murmurs      Unspecified constipation      Unspecified essential hypertension      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 (Patient taking differently: Take 70 mg by mouth every 7 days TAKE 1 TABLET EVERY 7 DAYS AT LEAST 60 MINUTES BEFORE BREAKFAST AS DIRECTED.) 12 tablet 3     aspirin 81 MG tablet Take 81 mg by mouth every evening PT last dose 8.19.2020       Calcium Citrate-Vitamin D (CALCIUM + D PO) Take 1 tablet by mouth every morning        cycloSPORINE (RESTASIS) 0.05 % ophthalmic emulsion Place 1 drop into both eyes 2 times daily       ferrous sulfate 325 (65 Fe) MG TBEC EC tablet Take 325 mg by mouth every morning        furosemide (LASIX) 20 MG tablet TAKE 1 TABLET (20 MG) BY MOUTH EVERY MORNING 90 tablet 3     irbesartan (AVAPRO) 300 MG tablet TAKE 1 TABLET EVERY DAY (Patient taking differently: Take 300 mg by mouth every morning TAKE 1 TABLET EVERY DAY) 90 tablet 3     levofloxacin (LEVAQUIN) 500 MG tablet Take 1 tablet 6 hours post treatment and 1 tablet each AM following each treatment. 6 tablet 6     lovastatin (MEVACOR) 40 MG tablet TAKE 1 TABLET AT BEDTIME (HYPERLIPIDEMIA LDL GOAL BELOW 130) 90 tablet 0     methimazole (TAPAZOLE) 5 MG tablet Take 1 tablet (5 mg) by mouth daily (Patient taking differently: Take 5 mg by mouth every morning ) 90 tablet 3     Multiple Vitamins-Minerals (PRESERVISION AREDS 2 PO) Take 1 tablet by mouth every morning       nitroGLYcerin (NITROSTAT) 0.4 MG sublingual tablet For chest pain place 1 tablet under the tongue every 5 minutes for 3 doses. If symptoms persist 5 minutes after 1st dose call 911. 25 tablet 3     Omega-3 Fatty Acids (FISH OIL) 500 MG CAPS Take 1 capsule by mouth every morning        omeprazole (PRILOSEC) 20 MG DR capsule TAKE 1 CAPSULE EVERY DAY (Patient taking differently: Take 20 mg by mouth every morning TAKE 1 CAPSULE EVERY DAY) 90 capsule 3     Probiotic Product (PROBIOTIC ADVANCED PO) Take 1 capsule by mouth every morning        propranolol  ER (INDERAL LA) 60 MG 24 hr capsule TAKE 1 CAPSULE EVERY MORNING 90 capsule 1     rOPINIRole (REQUIP) 0.25 MG tablet TAKE 1 TABLET IN THE AFTERNOON AND TAKE 2 TABLETS EVERY NIGHT 270 tablet 1     sertraline (ZOLOFT) 100 MG tablet TAKE 1 TABLET (100 MG) BY MOUTH EVERY MORNING 90 tablet 2     traZODone (DESYREL) 50 MG tablet TAKE 1/2 TABLET AT BEDTIME 45 tablet 2       PAST SURGICAL HISTORY:  Past Surgical History:   Procedure Laterality Date     BIOPSY       COLONOSCOPY  2014     COMBINED CYSTOSCOPY, INSERT STENT URETER(S) Bilateral 9/12/2018    Procedure: COMBINED CYSTOSCOPY, INSERT STENT URETER(S);  Cystoscopy Bilateral ureteral Stent Placement.;  Surgeon: Justin Henry MD;  [facility]     COMBINED CYSTOSCOPY, RETROGRADES, URETEROSCOPY, INSERT STENT Bilateral 4/3/2018    Procedure: COMBINED CYSTOSCOPY, RETROGRADES, URETEROSCOPY, INSERT STENT;;  Surgeon: Stuart King MD;  [facility]     COMBINED CYSTOSCOPY, RETROGRADES, URETEROSCOPY, INSERT STENT Bilateral 9/10/2018    Procedure: COMBINED CYSTOSCOPY, RETROGRADES, URETEROSCOPY, INSERT STENT;  Cystoscopy, Bilateral Ureteroscopy, Bladder Biopsies, Retrogram Pyelograms, Ureteral Washings and brushings, cysview;  Surgeon: Stuart King MD;  [facility]     COMBINED CYSTOSCOPY, RETROGRADES, URETEROSCOPY, INSERT STENT Left 8/26/2020    Procedure: Cystoscopy, Left Ureteral Wash, Left ureteral Retrograde Pyelogram;  Surgeon: Justin Henry MD;  [facility]     CYSTOSCOPY, BIOPSY BLADDER INSTILL OPTICAL AGENT N/A 4/3/2018    Procedure: CYSTOSCOPY, BIOPSY BLADDER INSTILL OPTICAL AGENT;  Cystoscopy, Blue Light Cystoscopy, Bladder Biopsies, Bilateral Selective ureteral washings for Cytology, Bilateral Retrograde Pyelograms, Bilateral Ureteroscopy;  Surgeon: Stuart King MD;  [facility]     CYSTOSCOPY, BIOPSY BLADDER, COMBINED N/A 2/19/2018    Procedure: COMBINED CYSTOSCOPY, BIOPSY BLADDER;  Cystoscopy,  Bladder Biopsy;  Surgeon: Kenna La MD;  [facility]     CYSTOSCOPY, BIOPSY BLADDER, COMBINED N/A 8/26/2020    Procedure: Cystoscopy, biopsy bladder, combined;  Surgeon: Justin Henry MD;  [facility]     CYSTOSCOPY, FULGURATE BLADDER TUMOR, COMBINED N/A 1/13/2020    Procedure: CYSTOSCOPY, WITH  bladder biopsies and fulguration;  Surgeon: Stuart King MD;  [facility]     CYSTOSCOPY, REMOVE STENT(S), COMBINED  11/13/2018    Procedure: Flexible Cystoscopy with Stent Removal;  Surgeon: Stuart King MD;  [facility]     DAVINCI LYMPHADENECTOMY N/A 11/13/2018    Procedure: Davinci Lymphadenectomy ;  Surgeon: Stuart King MD;  [facility]     DAVINCI NEPHROURETERECTOMY N/A 11/13/2018    Procedure: Right DaVinci Assisted Nephroureterectomy;  Surgeon: Stuart King MD;  [facility]     ENDOSCOPIC ULTRASOUND LOWER GASTROINTESTIONAL TRACT (GI) N/A 10/30/2015    Procedure: ENDOSCOPIC ULTRASOUND LOWER GASTROINTESTIONAL TRACT (GI);  Surgeon: Daniel Jean-Baptiste MD;  [facility]     EYE SURGERY  12/4/17     INSERT PORT VASCULAR ACCESS Right 12/19/2018    Procedure: Chest Port Placement - right;  Surgeon: Stuart Chavez PA-C;  [facility]     IR CHEST PORT PLACEMENT > 5 YRS OF AGE  12/19/2018     IR PORT REMOVAL RIGHT  6/26/2019     LAPAROSCOPIC CHOLECYSTECTOMY WITH CHOLANGIOGRAMS N/A 11/1/2015    Procedure: LAPAROSCOPIC CHOLECYSTECTOMY WITH CHOLANGIOGRAMS;  Surgeon: Tonie Warren MD;  [facility]     REMOVE PORT VASCULAR ACCESS Right 6/26/2019    Procedure: Right Port Removal;  Surgeon: Froilan Irizarry PA-C;  [facility]     SURGICAL HISTORY OF -   1996    malignant melanoma     SURGICAL HISTORY OF -   1968    thyroid nodule     SURGICAL HISTORY OF -       D & C     ZZC NONSPECIFIC PROCEDURE  2005    colonoscopy polyp repeat 2010       ALLERGIES:     Allergies   Allergen Reactions      Codeine        FAMILY HISTORY:  Family History   Problem Relation Age of Onset     Cancer Father         dec - esophageal and laryngeal     Heart Disease Mother      Respiratory Mother         dec     Breast Cancer Daughter      Other Cancer Daughter      Thyroid Disease Daughter      Asthma Daughter      Hyperlipidemia Son      Diabetes Son      Anesthesia Reaction No family hx of      Deep Vein Thrombosis (DVT) No family hx of          SOCIAL HISTORY:  Social History     Tobacco Use     Smoking status: Never Smoker     Smokeless tobacco: Never Used   Substance Use Topics     Alcohol use: No     Alcohol/week: 0.0 standard drinks     Comment: rare     Drug use: No       ROS:   Constitutional: No fever, chills, or sweats. Weight stable.   ENT: No visual disturbance, ear ache, epistaxis, sore throat.   Cardiovascular: As per HPI.   Respiratory: No cough, hemoptysis.    GI: No nausea, vomiting, hematemesis,   : No hematuria currently but patient may still require additional bladder chemo.  Integument: Negative.   Psychiatric: Negative.   Hematologic:   no easy bleeding.  Neuro: Negative.   Endocrinology: No significant heat or cold intolerance   Musculoskeletal: No myalgia.    Exam:  There were no vitals taken for this visit.    RESPIRATORY:no rales, rhonchi or wheezes, no use of accessory muscles, no retractions, respirations are unlabored, normal respiratory rate  NEURO: alert and oriented to person/place/time, normal speech, gait and affect  PSYCH: No abnormality    Labs:  CBC RESULTS:   Lab Results   Component Value Date    WBC 11.2 (H) 09/03/2020    RBC 4.08 09/03/2020    HGB 13.0 09/03/2020    HCT 40.2 09/03/2020    MCV 99 09/03/2020    MCH 31.9 09/03/2020    MCHC 32.3 09/03/2020    RDW 12.8 09/03/2020     09/03/2020       BMP RESULTS:  Lab Results   Component Value Date     09/03/2020    POTASSIUM 4.4 09/03/2020    CHLORIDE 104 09/03/2020    CO2 30 09/03/2020    ANIONGAP 4 09/03/2020    GLC 93  09/03/2020    BUN 23 09/03/2020    CR 1.03 09/03/2020    GFRESTIMATED 49 (L) 09/03/2020    GFRESTBLACK 57 (L) 09/03/2020    SHREYA 9.0 09/03/2020        INR RESULTS:  Lab Results   Component Value Date    INR 0.95 07/20/2020    INR 0.99 06/26/2019    INR 0.97 02/07/2019    INR 0.91 12/19/2018       Procedures:  PULMONARY FUNCTION TESTS:   No flowsheet data found.      ECHOCARDIOGRAM:   Interpretation Summary   9/2020  Global and regional left ventricular function is normal with an EF of 60-65%.  Grade II or moderate diastolic dysfunction.  The right ventricle is normal size.Global right ventricular function is  normal.  Moderate aortic stenosis is present. Mild aortic insufficiency is present.  This study was compared with the study from 09/11/2018. There has been no  change.    Assessment and Plan:   1.  Paroxysmal atrial fibrillation under good symptomatic control currently with beta-blocker alone.  2.  Patient is not anticoagulated due to a bladder bleeding issue when started on rivaroxaban in 2018.  We will low reconsider following that the planned surgery as discussed above.  3.  Multiple comorbidities including hypertension, previous stress cardiomyopathy now resolved, bladder malignancy and planned knee surgery.    Plan  1.  As noted above patient is a moderate risk for knee surgery but would seem to have adequate cardiac status to undergo a necessary procedure  2.  Plan for clinic visit aboot 4 to 6 weeks following surgery to discuss anticoagulation.  3.  No change in medication at present time    Telephone on 235pm ; telephone off 258pm; elapsed    I very much appreciated the opportunity to see and assess Dimple Oviedo in the clinic today. Please do not hesitate to contact my office if you have any questions or concerns.      Butch Griffith MD  Cardiac Arrhythmia Service  [facility]  [phone]    CC  SELF, REFERRED

## 2020-10-15 ENCOUNTER — MYC MEDICAL ADVICE (OUTPATIENT)
Dept: FAMILY MEDICINE | Facility: CLINIC | Age: 85
End: 2020-10-15

## 2020-10-20 ENCOUNTER — TELEPHONE (OUTPATIENT)
Dept: ORTHOPEDICS | Facility: CLINIC | Age: 85
End: 2020-10-20
Payer: MEDICARE

## 2020-10-20 DIAGNOSIS — Z53.9 ERRONEOUS ENCOUNTER--DISREGARD: Primary | ICD-10-CM

## 2020-10-20 NOTE — PATIENT INSTRUCTIONS
Preparing for Your Surgery      Name:  Dimple Oviedo   MRN:  [id number]   :  1933   Today's Date:  10/20/2020       Arriving for surgery:  Surgery date:  20  Arrival time:  11:00 am    Restrictions due to COVID 19:  Patients are allowed one visitor in the pre-op period  All visitors must wear a mask  No visitors under 18  No ill visitors   parking is available for anyone with mobility limitations or disabilities.  ([place]  24 hours/ 7 days a week; [facility]  7 am- 3:30 pm, Mon- Fri)    Please come to:   [facility] [address]  [address] [place], [place] [address]. [place]  [address]    -Come in the front of [facility]. Park your car in the Green Lot.    -Proceed to the 3rd floor, check in at the Adult Surgery Waiting Lounge. [phone]    If an escort is needed stop at the Information Desk in the lobby. Inform the information person that you are here for surgery. An escort to the Adult Surgery Waiting Lounge will be provided.        What can I eat or drink?  -  You may eat and drink normally for up to 8 hours before your surgery.  -  You may have clear liquids until 2 hours before surgery  Examples of clear liquids:  Water  Clear broth  Juices (apple, white grape, white cranberry  and cider) without pulp  Noncarbonated, powder based beverages  (lemonade and Gorge-Aid)  Sodas (Sprite, 7-Up, ginger ale and seltzer)  Coffee or tea (without milk or cream)  Gatorade    -  No Alcohol for at least 24 hours before surgery     Which medicines can I take?    Hold Aspirin for 7 days before surgery.   Hold Multivitamins for 7 days before surgery.  Hold Supplements (fish oil) for 7 days before surgery.  Hold Ibuprofen (Advil, Motrin) for 1 day before surgery--unless otherwise directed by surgeon.  Hold Naproxen (Aleve) for 4 days before surgery.    -  DO NOT take these medications the day of surgery:  Lasix (furosemide) + avapro (irbesartan) if both are normally taken in the  morning.  -  PLEASE TAKE these medications the day of surgery:  Tylenol if needed; take all other scheduled morning medications.    How do I prepare myself?  - Please take 2 showers before surgery using Scrubcare or Hibiclens soap.    Use this soap only from the neck to your toes.     Leave the soap on your skin for one minute--then rinse thoroughly.      You may use your own shampoo and conditioner; no other hair products.   - Please remove all jewelry and body piercings.  - No lotions, deodorants or fragrance.  - No makeup or fingernail polish.   - Bring your ID and insurance card.    - All patients are required to have a Covid-19 test within 4 days of surgery/procedure.      -Patients will be contacted by the [facility] scheduling team within 1 week of surgery to make an appointment.      - Patients may call the Scheduling team at [phone] if they have not been scheduled within 4 days of  surgery.      Questions or Concerns:    - For any questions regarding the day of surgery or your hospital stay, please contact the Pre Admission Nursing Office at [phone].       - If you have health changes between today and your surgery please call your surgeon.       For questions after surgery please call your surgeons office.           Enhanced Recovery After Surgery     This is a team effort, including you, to get you back on your feet, eating and drinking normally and out of the hospital as quickly as possible.  The goals are: 1) NO INFECTIONS and   2) RETURN TO NORMAL DIET    How can we achieve these goals?  1) STAY ACTIVE: Walk every day before your surgery; try to increase the amount every day.  Walk after surgery as much as you can-the nurses will help you.  Walking speeds healing and gets you home quicker, you heal better at home and have less risk of infection.     2) INCENTIVE SPIROMETER: Practice your incentive spirometer 4 times per day with 5 repetitions each time.  Using the incentive spirometer  can strengthen your muscles between your ribs and help you have a strong cough after surgery.  A more effective cough can help prevent problems with your lungs.    3) STAY HYDRATED: Drink clear liquids up until 2 hours before your surgery. We would like you to purchase a drink such as Gatorade or Ensure Clear (not the milkshake type).  Drink this before bedtime and on the way into the hospital, drink between 8-10 ounces or until you feel hydrated.  Keeping well hydrated leads to your veins being plump, you wake up faster, and you are less likely to be nauseated. Start drinking water as soon as you can after surgery and advance to clear liquids and food as tolerated.  IV fluids contain salt, drinking fluids will minimize the amount of IV fluids you need and decrease the amount of salt you get.    The most common reason for the patient to be readmitted is dehydration. Staying hydrated after you go home from the hospital is very important.  Ensure or Ensure Clear are good options to keep you hydrated.     4) PAIN MANAGEMENT: If we minimize the amount of opioids and narcotics, and use regional blocks (which numb the area where your surgery is) along with oral pain medications; you will have less side effects of nausea and constipation. Narcotics can slow down your bowels and cause you to stay in the hospital longer.       Our goal is to keep you comfortable; eating and drinking normally and back home safely.

## 2020-10-21 ENCOUNTER — TELEPHONE (OUTPATIENT)
Dept: ORTHOPEDICS | Facility: CLINIC | Age: 85
End: 2020-10-21

## 2020-10-21 ENCOUNTER — ANESTHESIA EVENT (OUTPATIENT)
Dept: SURGERY | Facility: CLINIC | Age: 85
End: 2020-10-21

## 2020-10-21 ENCOUNTER — PRE VISIT (OUTPATIENT)
Dept: SURGERY | Facility: CLINIC | Age: 85
End: 2020-10-21

## 2020-10-21 ENCOUNTER — HOSPITAL ENCOUNTER (OUTPATIENT)
Dept: PHYSICAL THERAPY | Facility: CLINIC | Age: 85
Setting detail: THERAPIES SERIES
End: 2020-10-21
Payer: MEDICARE

## 2020-10-21 ENCOUNTER — VIRTUAL VISIT (OUTPATIENT)
Dept: SURGERY | Facility: CLINIC | Age: 85
End: 2020-10-21
Payer: MEDICARE

## 2020-10-21 DIAGNOSIS — M17.11 OSTEOARTHRITIS OF RIGHT KNEE, UNSPECIFIED OSTEOARTHRITIS TYPE: ICD-10-CM

## 2020-10-21 DIAGNOSIS — Z01.818 PRE-OP EVALUATION: Primary | ICD-10-CM

## 2020-10-21 PROCEDURE — 97535 SELF CARE MNGMENT TRAINING: CPT | Mod: GP | Performed by: PHYSICAL THERAPIST

## 2020-10-21 PROCEDURE — 98968 PH1 ASSMT&MGMT NQHP 21-30: CPT | Performed by: PHYSICIAN ASSISTANT

## 2020-10-21 PROCEDURE — 97140 MANUAL THERAPY 1/> REGIONS: CPT | Mod: GP | Performed by: PHYSICAL THERAPIST

## 2020-10-21 ASSESSMENT — LIFESTYLE VARIABLES: TOBACCO_USE: 0

## 2020-10-21 ASSESSMENT — ENCOUNTER SYMPTOMS
SEIZURES: 0
DYSRHYTHMIAS: 1

## 2020-10-21 ASSESSMENT — PAIN SCALES - GENERAL: PAINLEVEL: EXTREME PAIN (8)

## 2020-10-21 NOTE — PROGRESS NOTES
"Dimple Oviedo is a 87 year old female who is being evaluated via a billable video visit.      The patient has been notified of following:     \"This video visit will be conducted via a call between you and your physician/provider. We have found that certain health care needs can be provided without the need for an in-person physical exam.  This service lets us provide the care you need with a video conversation.  If a prescription is necessary we can send it directly to your pharmacy.  If lab work is needed we can place an order for that and you can then stop by our lab to have the test done at a later time.    Video visits are billed at different rates depending on your insurance coverage.  Please reach out to your insurance provider with any questions.    If during the course of the call the physician/provider feels a video visit is not appropriate, you will not be charged for this service.\"    Patient has given verbal consent for Video visit? Yes  How would you like to obtain your AVS? MyChart    Will anyone else be joining your video visit? No        MIGUEL Salmeron LPN          "

## 2020-10-21 NOTE — TELEPHONE ENCOUNTER
Returned call to patient. Discussed that her virtual appointment today is with the PAC clinic and not Dr. Otero's clinic. She will await their phone call to check her in. Informed patient of her clinic appointment next Wednesday 10/28 at 12:00 pm with Dr. Otero for follow up before surgery. Patient expressed understanding.

## 2020-10-21 NOTE — ANESTHESIA PREPROCEDURE EVALUATION
Anesthesia Pre-Procedure Evaluation    Patient: Dmiple Oviedo   MRN:     [id number] Gender:   female   Age:    87 year old :      1933        Preoperative Diagnosis: * No surgery found *        LABS:  CBC:   Lab Results   Component Value Date    WBC 11.2 (H) 2020    WBC 6.2 08/10/2020    HGB 13.0 2020    HGB 13.1 08/10/2020    HCT 40.2 2020    HCT 41.0 08/10/2020     2020     08/10/2020     BMP:   Lab Results   Component Value Date     2020     08/10/2020    POTASSIUM 4.4 2020    POTASSIUM 4.7 2020    CHLORIDE 104 2020    CHLORIDE 105 08/10/2020    CO2 30 2020    CO2 28 08/10/2020    BUN 23 2020    BUN 19 08/10/2020    CR 1.03 2020    CR 1.05 (H) 08/10/2020    GLC 93 2020     (H) 2020     COAGS:   Lab Results   Component Value Date    PTT 27 2020    INR 0.95 2020     POC:   Lab Results   Component Value Date    BGM 99 2018    HCGS Negative 2017     OTHER:   Lab Results   Component Value Date    LACT 1.0 2017    A1C 6.2 (H) 2017    SHREYA 9.0 2020    PHOS 2.4 (L) 2017    MAG 1.8 2019    ALBUMIN 3.6 2020    PROTTOTAL 7.5 2020    ALT 20 2020    AST 16 2020    ALKPHOS 97 2020    BILITOTAL 0.4 2020    LIPASE 91 2018    AMYLASE 44 10/29/2015    TSH 3.39 2020    T4 0.73 (L) 2020    T3 73 2020    CRP <2.9 10/06/2017    SED 25 10/06/2017        Preop Vitals    BP Readings from Last 3 Encounters:   20 (!) 168/62   20 134/55   20 137/78    Pulse Readings from Last 3 Encounters:   20 68   20 52   20 61      Resp Readings from Last 3 Encounters:   20 16   20 16   20 18    SpO2 Readings from Last 3 Encounters:   20 95%   20 97%   20 95%      Temp Readings from Last 1 Encounters:   20 98.5  F (36.9  C) (Oral)    Ht Readings  "from Last 1 Encounters:   08/26/20 1.448 m (4' 9.01\")      Wt Readings from Last 1 Encounters:   08/26/20 74 kg (163 lb 2.3 oz)    Estimated body mass index is 35.29 kg/m  as calculated from the following:    Height as of 8/26/20: 1.448 m (4' 9.01\").    Weight as of 8/26/20: 74 kg (163 lb 2.3 oz).     LDA:  Urethral Catheter Double-lumen;Latex;Straight-tip;Silver coated 16 fr (Active)   Number of days: 708        Past Medical History:   Diagnosis Date     Calculus of kidney      Chemotherapy-induced neutropenia (H) 3/6/2019     Esophageal reflux      GERD (gastroesophageal reflux disease)      Hyperlipidemia LDL goal <130 5/9/2010     Malignant melanoma of skin of trunk, except scrotum (H)      Nonspecific abnormal finding     has living will 2004 -      Nontoxic multinodular goiter     no further eval /tx rec per pt     Osteopenia      Other psoriasis      Personal history of colonic polyps      PMR (polymyalgia rheumatica) (H)      Stress-induced cardiomyopathy      Undiagnosed cardiac murmurs      Unspecified constipation      Unspecified essential hypertension      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      Past Surgical History:   Procedure Laterality Date     BIOPSY       COLONOSCOPY  2014     COMBINED CYSTOSCOPY, INSERT STENT URETER(S) Bilateral 9/12/2018    Procedure: COMBINED CYSTOSCOPY, INSERT STENT URETER(S);  Cystoscopy Bilateral ureteral Stent Placement.;  Surgeon: Justin Henry MD;  [facility]     COMBINED CYSTOSCOPY, RETROGRADES, URETEROSCOPY, INSERT STENT Bilateral 4/3/2018    Procedure: COMBINED CYSTOSCOPY, RETROGRADES, URETEROSCOPY, INSERT STENT;;  Surgeon: Stuart King MD;  [facility]     COMBINED CYSTOSCOPY, RETROGRADES, URETEROSCOPY, INSERT STENT Bilateral 9/10/2018    Procedure: COMBINED CYSTOSCOPY, RETROGRADES, URETEROSCOPY, INSERT STENT;  Cystoscopy, Bilateral Ureteroscopy, Bladder Biopsies, Retrogram Pyelograms, Ureteral Washings and brushings, cysview;  Surgeon: " Stuart King MD;  [facility]     COMBINED CYSTOSCOPY, RETROGRADES, URETEROSCOPY, INSERT STENT Left 8/26/2020    Procedure: Cystoscopy, Left Ureteral Wash, Left ureteral Retrograde Pyelogram;  Surgeon: Justin Henry MD;  [facility]     CYSTOSCOPY, BIOPSY BLADDER INSTILL OPTICAL AGENT N/A 4/3/2018    Procedure: CYSTOSCOPY, BIOPSY BLADDER INSTILL OPTICAL AGENT;  Cystoscopy, Blue Light Cystoscopy, Bladder Biopsies, Bilateral Selective ureteral washings for Cytology, Bilateral Retrograde Pyelograms, Bilateral Ureteroscopy;  Surgeon: Stuart King MD;  [facility]     CYSTOSCOPY, BIOPSY BLADDER, COMBINED N/A 2/19/2018    Procedure: COMBINED CYSTOSCOPY, BIOPSY BLADDER;  Cystoscopy, Bladder Biopsy;  Surgeon: Kenna La MD;  [facility]     CYSTOSCOPY, BIOPSY BLADDER, COMBINED N/A 8/26/2020    Procedure: Cystoscopy, biopsy bladder, combined;  Surgeon: Justin Henry MD;  [facility]     CYSTOSCOPY, FULGURATE BLADDER TUMOR, COMBINED N/A 1/13/2020    Procedure: CYSTOSCOPY, WITH  bladder biopsies and fulguration;  Surgeon: Stuart King MD;  [facility]     CYSTOSCOPY, REMOVE STENT(S), COMBINED  11/13/2018    Procedure: Flexible Cystoscopy with Stent Removal;  Surgeon: Stuart King MD;  [facility]     DAVINCI LYMPHADENECTOMY N/A 11/13/2018    Procedure: Davinci Lymphadenectomy ;  Surgeon: Stuart King MD;  [facility]     DAVINCI NEPHROURETERECTOMY N/A 11/13/2018    Procedure: Right DaVinci Assisted Nephroureterectomy;  Surgeon: Stuart King MD;  [facility]     ENDOSCOPIC ULTRASOUND LOWER GASTROINTESTIONAL TRACT (GI) N/A 10/30/2015    Procedure: ENDOSCOPIC ULTRASOUND LOWER GASTROINTESTIONAL TRACT (GI);  Surgeon: Daniel Jean-Baptiste MD;  [facility]     EYE SURGERY  12/4/17     INSERT PORT VASCULAR ACCESS Right 12/19/2018    Procedure: Chest Port Placement - right;  Surgeon:  Stuart Chavez PA-C;  [facility]     IR CHEST PORT PLACEMENT > 5 YRS OF AGE  12/19/2018     IR PORT REMOVAL RIGHT  6/26/2019     LAPAROSCOPIC CHOLECYSTECTOMY WITH CHOLANGIOGRAMS N/A 11/1/2015    Procedure: LAPAROSCOPIC CHOLECYSTECTOMY WITH CHOLANGIOGRAMS;  Surgeon: Tonie Warren MD;  [facility]     REMOVE PORT VASCULAR ACCESS Right 6/26/2019    Procedure: Right Port Removal;  Surgeon: Froilan Irizarry PA-C;  [facility]     SURGICAL HISTORY OF -   1996    malignant melanoma     SURGICAL HISTORY OF -   1968    thyroid nodule     SURGICAL HISTORY OF -       D & C     ZZC NONSPECIFIC PROCEDURE  2005    colonoscopy polyp repeat 2010      Allergies   Allergen Reactions     Codeine         Anesthesia Evaluation     . Pt has had prior anesthetic. Type: General    History of anesthetic complications   - PONV        ROS/MED HX    ENT/Pulmonary:     (+)JESS risk factors snores loudly, hypertension, , . .   (-) tobacco use and asthma   Neurologic:     (+)other neuro tremors, RLS   (-) seizures and Neuropathy   Cardiovascular: Comment: AF with RVR in setting of hyperthyroidism. Spontaneous conversion. Managed with B-blocker, ACEI, statin and ASA.     Chronic bilateral lower extremity lymphedema.        (+) Dyslipidemia, hypertension----. Taking blood thinners : . CHF etiology: Stress induced cardiomyopathy 12/13/17 Last EF: 60-65% date: 9/2/20 . . :. dysrhythmias a-fib, valvular problems/murmurs type: AS and AI . Previous cardiac testing Echodate:9/2020results:Interpretation Summary  Global and regional left ventricular function is normal with an EF of 60-65%.  Grade II or moderate diastolic dysfunction.  The right ventricle is normal size.Global right ventricular function is  normal.  Moderate aortic stenosis is present. Mild aortic insufficiency is present.  This study was compared with the study from 09/11/2018. There has been no  change.date: results:ECG reviewed date:2/3/19 results:SR,  PACs, [facility] date: 12/2017 results:          METS/Exercise Tolerance: Comment: Limited due to knee pain- reports she has been walking laps [place]- about 3 at a time with her walker. 3 - Able to walk 1-2 blocks without stopping   Hematologic:  - neg hematologic  ROS      (-) History of Transfusion   Musculoskeletal: Comment: Osteopenia    Osteoarthritis in knees bilaterally. Right hip pain. Lumbar stenosis, LBP.  (+) arthritis,  -       GI/Hepatic:     (+) GERD Asymptomatic on medication, cholecystitis/cholelithiasis (s/p cholecystectomy),       Renal/Genitourinary: Comment: S/P nephroureterectomy 11/2018    (+) chronic renal disease, type: CRI, Nephrolithiasis , Pt does not require dialysis, Pt has no history of transplant,       Endo:     (+) thyroid problem (Graves disease)  hyperthyroidism, Obesity, .      Psychiatric:     (+) psychiatric history depression      Infectious Disease:  - neg infectious disease ROS       Malignancy:   (+) Malignancy History of Other  Skin CA status post Surgery, Other CA Urothelial cancer, bladder cancer status post Surgery and Chemo         Other:    (+) C-spine cleared: N/A, no H/O Chronic Pain,no other significant disability                    JZG FV AN PHYSICAL EXAM    JZG FV AN PLAN NO PONV RULE       PAC Discussion and Assessment    ASA Classification: 3  Case is suitable for: [place]  Anesthetic techniques and relevant risks discussed: Regional  Invasive monitoring and risk discussed:   Types:   Possibility and Risk of blood transfusion discussed:   NPO instructions given:   Additional anesthetic preparation and risks discussed:   Needs early admission to pre-op area:   Other:     PAC Resident/NP Anesthesia Assessment:  Dimple Oviedo is an 87 year old female scheduled for R TKA on 11/9/20 by Dr. Otero in treatment of OA.  PAC referral for risk assessment and optimization for anesthesia with comorbid conditions of hypertension, dyslipidemia, CHF, h/o a  fib, RLS, GERD, anxiety, urothelial carcinoma, polymyalgia rheumatica:    Pre-operative considerations:  1.  Cardiac:  Functional status- METS 3- reports she has been walking laps [place] (using a walker). States she does about 3 laps at a time and thinks she can walk equivalent to one block before taking a break. Denies cardiac symptoms. Previous cardiac testing as above.  H/o stress induced cardiomyopathy with reduced EF that has since resolved.  H/o paroxysmal atrial fibrillation without recent symptoms maintained on propranolol- most recent EKG showed SR with PACs. She is also using irbesartan for hypertension, lovastatin for dyslipidemia and lasix for edema.  She was seen by Dr. Griffith 10/14/20 and was approved for the above procedure at adequate risk from a cardiac perspective . She will see Dr. Griffith for follow up after surgery to discuss possibly restarting anticoagulation. intermediate risk surgery with 0.9% (RCRI #) risk of major adverse cardiac event.   2.  Pulm:   JESS risk: intermediate. Non smoker. Denies pulmonary symptoms. COVID testing ordered per surgery team.   3.  GI:  Risk of PONV score = 3.  If > 2, anti-emetic intervention recommended. GERD controlled using prilosec.   4.  Neuro: RLS using requip.   5. Endo: hyperthyroid using methimazole  6. Psych: anxiety and depression using zoloft and trazodone. States symptoms have improved.    7. : h/o urothelial carcinoma s/p surgery and chemotherapy. Most recent cystoscopy completed 8/26/2020. Patient states 3 infusions of maintenance BCG will be scheduled after knee replacement per her urology team.   8. Msk: right knee OA with above procedure planned. She has an appointment with Dr. Otero 10/28/20 to discuss upcoming surgery. We will complete preop labs (T&S and CBC) when she is in the building for that visit.   9. Access: patient reports h/o difficult IV access  10. ERAS initiated    VTE risk: 0.5%    Patient is optimized  and is acceptable candidate for the proposed procedure.  No further diagnostic evaluation is needed.     Patient discussed with Dr. Shrestha    **Physical exam and vital signs not completed today as this visit was scheduled as a virtual visit during Covid 19 pandemic. Physical exam should be completed the DOS in pre-op**    **For further details of assessment and testing please see H and P completed on same date.      Kylie Dumont PA-C        Mid-Level Provider/Resident:   Date:   Time:     Attending Anesthesiologist Anesthesia Assessment:        Anesthesiologist:   Date:   Time:   Pass/Fail:   Disposition:     PAC Pharmacist Assessment:        Pharmacist:   Date:   Time:    Kylie Dumont PA-C

## 2020-10-21 NOTE — H&P
Pre-Operative H & P         Video-Visit Details    Type of service:  Video Visit- converted to telephone visit due to technical difficulty    Patient verbally consented to video service today: YES      Video Start Time: 1455  Video End Time (time video stopped): 1540    Originating Location (pt. Location): Home    Distant Location (provider location):  home    Mode of Communication:  Video Conference via [employer]      CC:  Preoperative exam to assess for increased cardiopulmonary risk while undergoing surgery and anesthesia.    Date of Encounter: 10/21/2020  Primary Care Physician:  Pop Zapien  associated diagnosis: osteoarthritis    HPI  Dimple Oviedo is a 87 year old female who presents for pre-operative H & P in preparation for R TKA with Dr. Otero on 11/9/20 at [facility]. Patient is being evaluated for comorbid conditions of hypertension, dyslipidemia, CHF, h/o a fib, RLS, GERD, anxiety, urothelial carcinoma, polymyalgia rheumatica        Ms. Oviedo is has a longstanding history of right knee pain. She has been seen by Dr. Otero for further evaluation- most recently seen 9/2019. She has tried steroid injections and exercises without longterm relief. Surgery was initially scheduled in February, but was delayed due to need for BCG treatment for recurrent urothelial carcinoma. Above procedure now planned.       History is obtained from the patient and chart review.    Past Medical History  Past Medical History:   Diagnosis Date     Calculus of kidney      Chemotherapy-induced neutropenia (H) 3/6/2019     Esophageal reflux      GERD (gastroesophageal reflux disease)      Hyperlipidemia LDL goal <130 5/9/2010     Malignant melanoma of skin of trunk, except scrotum (H)      Nonspecific abnormal finding     has living will 2004 -      Nontoxic multinodular goiter     no further eval /tx rec per pt     Osteopenia      Other psoriasis      Personal history  of colonic polyps      PMR (polymyalgia rheumatica) (H)      Stress-induced cardiomyopathy      Undiagnosed cardiac murmurs      Unspecified constipation      Unspecified essential hypertension      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       Past Surgical History  Past Surgical History:   Procedure Laterality Date     BIOPSY       COLONOSCOPY  2014     COMBINED CYSTOSCOPY, INSERT STENT URETER(S) Bilateral 9/12/2018    Procedure: COMBINED CYSTOSCOPY, INSERT STENT URETER(S);  Cystoscopy Bilateral ureteral Stent Placement.;  Surgeon: Justin Henry MD;  [facility]     COMBINED CYSTOSCOPY, RETROGRADES, URETEROSCOPY, INSERT STENT Bilateral 4/3/2018    Procedure: COMBINED CYSTOSCOPY, RETROGRADES, URETEROSCOPY, INSERT STENT;;  Surgeon: Stuart King MD;  [facility]     COMBINED CYSTOSCOPY, RETROGRADES, URETEROSCOPY, INSERT STENT Bilateral 9/10/2018    Procedure: COMBINED CYSTOSCOPY, RETROGRADES, URETEROSCOPY, INSERT STENT;  Cystoscopy, Bilateral Ureteroscopy, Bladder Biopsies, Retrogram Pyelograms, Ureteral Washings and brushings, cysview;  Surgeon: Stuart King MD;  [facility]     COMBINED CYSTOSCOPY, RETROGRADES, URETEROSCOPY, INSERT STENT Left 8/26/2020    Procedure: Cystoscopy, Left Ureteral Wash, Left ureteral Retrograde Pyelogram;  Surgeon: Justin Henry MD;  [facility]     CYSTOSCOPY, BIOPSY BLADDER INSTILL OPTICAL AGENT N/A 4/3/2018    Procedure: CYSTOSCOPY, BIOPSY BLADDER INSTILL OPTICAL AGENT;  Cystoscopy, Blue Light Cystoscopy, Bladder Biopsies, Bilateral Selective ureteral washings for Cytology, Bilateral Retrograde Pyelograms, Bilateral Ureteroscopy;  Surgeon: Stuart Kign MD;  [facility]     CYSTOSCOPY, BIOPSY BLADDER, COMBINED N/A 2/19/2018    Procedure: COMBINED CYSTOSCOPY, BIOPSY BLADDER;  Cystoscopy, Bladder Biopsy;  Surgeon: Kenna La MD;  [facility]     CYSTOSCOPY, BIOPSY BLADDER, COMBINED N/A 8/26/2020     Procedure: Cystoscopy, biopsy bladder, combined;  Surgeon: Justin Henry MD;  [facility]     CYSTOSCOPY, FULGURATE BLADDER TUMOR, COMBINED N/A 1/13/2020    Procedure: CYSTOSCOPY, WITH  bladder biopsies and fulguration;  Surgeon: Stuart King MD;  [facility]     CYSTOSCOPY, REMOVE STENT(S), COMBINED  11/13/2018    Procedure: Flexible Cystoscopy with Stent Removal;  Surgeon: Stuart King MD;  [facility]     DAVINCI LYMPHADENECTOMY N/A 11/13/2018    Procedure: Davinci Lymphadenectomy ;  Surgeon: Stuart King MD;  [facility]     DAVINCI NEPHROURETERECTOMY N/A 11/13/2018    Procedure: Right DaVinci Assisted Nephroureterectomy;  Surgeon: Stuart King MD;  [facility]     ENDOSCOPIC ULTRASOUND LOWER GASTROINTESTIONAL TRACT (GI) N/A 10/30/2015    Procedure: ENDOSCOPIC ULTRASOUND LOWER GASTROINTESTIONAL TRACT (GI);  Surgeon: Daniel Jean-Baptiste MD;  [facility]     EYE SURGERY  12/4/17     INSERT PORT VASCULAR ACCESS Right 12/19/2018    Procedure: Chest Port Placement - right;  Surgeon: Stuart Chavez PA-C;  [facility]     IR CHEST PORT PLACEMENT > 5 YRS OF AGE  12/19/2018     IR PORT REMOVAL RIGHT  6/26/2019     LAPAROSCOPIC CHOLECYSTECTOMY WITH CHOLANGIOGRAMS N/A 11/1/2015    Procedure: LAPAROSCOPIC CHOLECYSTECTOMY WITH CHOLANGIOGRAMS;  Surgeon: Tonie Warren MD;  [facility]     REMOVE PORT VASCULAR ACCESS Right 6/26/2019    Procedure: Right Port Removal;  Surgeon: Froilan Irizarry PA-C;  [facility]     SURGICAL HISTORY OF -   1996    malignant melanoma     SURGICAL HISTORY OF -   1968    thyroid nodule     SURGICAL HISTORY OF -       D & C     ZZC NONSPECIFIC PROCEDURE  2005    colonoscopy polyp repeat 2010       Hx of Blood transfusions/reactions: denies     Hx of abnormal bleeding or anti-platelet use: ASA 81 mg     Menstrual history: No LMP recorded. Patient is postmenopausal.    Steroid use  in the last year: denies    Personal or FH with difficulty with Anesthesia:  Distant history of PONV, but denies recent issues.     Prior to Admission Medications  Current Outpatient Medications   Medication Sig Dispense Refill     acetaminophen (TYLENOL) 650 MG CR tablet Take 1 tablet (650 mg) by mouth every 8 hours as needed for pain (Patient taking differently: Take 1,300 mg by mouth 2 times daily as needed for pain ) 100 tablet 0     alendronate (FOSAMAX) 70 MG tablet TAKE 1 TABLET EVERY 7 DAYS AT LEAST 60 MINUTES BEFORE BREAKFAST AS DIRECTED. (Patient taking differently: Take 70 mg by mouth every 7 days TAKE 1 TABLET EVERY 7 DAYS AT LEAST 60 MINUTES BEFORE BREAKFAST AS DIRECTED.) 12 tablet 3     aspirin 81 MG tablet Take 81 mg by mouth every evening PT last dose 8.19.2020       Calcium Citrate-Vitamin D (CALCIUM + D PO) Take 1 tablet by mouth every morning        cycloSPORINE (RESTASIS) 0.05 % ophthalmic emulsion Place 1 drop into both eyes 2 times daily       ferrous sulfate 325 (65 Fe) MG TBEC EC tablet Take 325 mg by mouth every morning        furosemide (LASIX) 20 MG tablet TAKE 1 TABLET (20 MG) BY MOUTH EVERY MORNING 90 tablet 3     irbesartan (AVAPRO) 300 MG tablet TAKE 1 TABLET EVERY DAY (Patient taking differently: Take 300 mg by mouth every morning TAKE 1 TABLET EVERY DAY) 90 tablet 3     lovastatin (MEVACOR) 40 MG tablet TAKE 1 TABLET AT BEDTIME (HYPERLIPIDEMIA LDL GOAL BELOW 130) (Patient taking differently: At Bedtime TAKE 1 TABLET AT BEDTIME (HYPERLIPIDEMIA LDL GOAL BELOW 130)) 90 tablet 0     methimazole (TAPAZOLE) 5 MG tablet Take 1 tablet (5 mg) by mouth daily (Patient taking differently: Take 5 mg by mouth every morning ) 90 tablet 3     Multiple Vitamins-Minerals (PRESERVISION AREDS 2 PO) Take 1 tablet by mouth every morning       nitroGLYcerin (NITROSTAT) 0.4 MG sublingual tablet For chest pain place 1 tablet under the tongue every 5 minutes for 3 doses. If symptoms persist 5 minutes after  1st dose call 911. 25 tablet 3     Omega-3 Fatty Acids (FISH OIL) 500 MG CAPS Take 1 capsule by mouth every morning        omeprazole (PRILOSEC) 20 MG DR capsule TAKE 1 CAPSULE EVERY DAY (Patient taking differently: Take 20 mg by mouth every morning TAKE 1 CAPSULE EVERY DAY) 90 capsule 3     Probiotic Product (PROBIOTIC ADVANCED PO) Take 1 capsule by mouth every morning        propranolol ER (INDERAL LA) 60 MG 24 hr capsule TAKE 1 CAPSULE EVERY MORNING (Patient taking differently: every morning ) 90 capsule 1     rOPINIRole (REQUIP) 0.25 MG tablet TAKE 1 TABLET IN THE AFTERNOON AND TAKE 2 TABLETS EVERY NIGHT (Patient taking differently: Take by mouth 2 times daily TAKE 1 TABLET IN THE AFTERNOON AND TAKE 2 TABLETS EVERY NIGHT ) 270 tablet 1     sertraline (ZOLOFT) 100 MG tablet TAKE 1 TABLET (100 MG) BY MOUTH EVERY MORNING 90 tablet 2     traZODone (DESYREL) 50 MG tablet TAKE 1/2 TABLET AT BEDTIME (Patient taking differently: At Bedtime TAKE 1/2 TABLET AT BEDTIME) 45 tablet 2       Allergies  Allergies   Allergen Reactions     Codeine        Social History  Social History     Socioeconomic History     Marital status:      Spouse name:      Number of children: 2     Years of education: Not on file     Highest education level: Not on file   Occupational History     Employer: RETIRED   Social Needs     Financial resource strain: Not on file     Food insecurity     Worry: Not on file     Inability: Not on file     Transportation needs     Medical: Not on file     Non-medical: Not on file   Tobacco Use     Smoking status: Never Smoker     Smokeless tobacco: Never Used   Substance and Sexual Activity     Alcohol use: No     Alcohol/week: 0.0 standard drinks     Comment: rare     Drug use: No     Sexual activity: Yes     Partners: Male   Lifestyle     Physical activity     Days per week: Not on file     Minutes per session: Not on file     Stress: Not on file   Relationships     Social connections     Talks on  phone: Not on file     Gets together: Not on file     Attends Confucianist service: Not on file     Active member of club or organization: Not on file     Attends meetings of clubs or organizations: Not on file     Relationship status: Not on file     Intimate partner violence     Fear of current or ex partner: Not on file     Emotionally abused: Not on file     Physically abused: Not on file     Forced sexual activity: Not on file   Other Topics Concern      Service No     Blood Transfusions No     Caffeine Concern Not Asked     Occupational Exposure Not Asked     Hobby Hazards Not Asked     Sleep Concern Not Asked     Stress Concern Not Asked     Weight Concern Not Asked     Special Diet Not Asked     Back Care Not Asked     Exercise Yes     Comment: curves     Bike Helmet Not Asked     Seat Belt Yes     Self-Exams Yes     Parent/sibling w/ CABG, MI or angioplasty before 65F 55M? No   Social History Narrative    December 7, 2009    Balanced Diet - Yes    Osteoporosis Prevention Measures - Dairy servings per day: 2 and Medication/Supplements (See current meds)    Regular Exercise -  Yes Describe curves, walking    Dental Exam - YES - Date: 10/2009    Eye Exam - YES - Date: 2008    Self Breast Exam - Yes    Abuse: Current or Past (Physical, Sexual or Emotional)- No    Do you feel safe in your environment - Yes    Guns stored in the home - No    Sunscreen used - Yes    Seatbelts used - Yes    Lipids -  YES - Date: 11/24/2009    Glucose -  YES - Date: 11/24/2009    Colon Cancer Screening - Colonoscopy 11/18/2005(date completed)    Hemoccults - YES - Date: 10/12/2004    Pap Test -  YES - Date: 09/22/2004    Do you have any concerns about STD's -  No    Mammography - YES - Date: 11/24/2009    DEXA - YES - Date: 11/20/2008    Immunizations reviewed and up to date - Yes    PAULETTE Spencer CMA               Family History  Family History   Problem Relation Age of Onset     Cancer Father         dec - esophageal and  laryngeal     Heart Disease Mother      Respiratory Mother         dec     Breast Cancer Daughter      Other Cancer Daughter      Thyroid Disease Daughter      Asthma Daughter      Hyperlipidemia Son      Diabetes Son      Anesthesia Reaction No family hx of      Deep Vein Thrombosis (DVT) No family hx of          ROS/MED HX    ENT/Pulmonary:     (+)JESS risk factors snores loudly, hypertension, , . .   (-) tobacco use and asthma   Neurologic:     (+)other neuro tremors, RLS   (-) seizures and Neuropathy   Cardiovascular: Comment: AF with RVR in setting of hyperthyroidism. Spontaneous conversion. Managed with B-blocker, ACEI, statin and ASA.     Chronic bilateral lower extremity lymphedema.        (+) Dyslipidemia, hypertension----. Taking blood thinners : . CHF etiology: Stress induced cardiomyopathy 12/13/17 Last EF: 60-65% date: 9/2/20 . . :. dysrhythmias a-fib, valvular problems/murmurs type: AS and AI . Previous cardiac testing Echodate:9/2018results:Interpretation Summary  Left ventricular function, chamber size, wall motion, and wall thickness are  normal.The EF is 55-60%.  Right ventricular function, chamber size, wall motion, and thickness are  normal.  Severe left atrial enlargement is present.  Aortic valve poorly visualized but appears to have moderate calcification and  at least mild-moderate stenosis.  Mild to moderate aortic insufficiency is present.  IVC measures 2.47cm and collapses with inspiration. Estimated mean right  atrial pressure is 8 mmHg (mildly elevated).  No pericardial effusion is present.date: results:ECG reviewed date:2/3/19 results:SR, PACs, LADCath date: 12/2017 results:          METS/Exercise Tolerance: Comment: Limited due to knee pain- reports she has been walking laps [place]- about 3 at a time with her walker. 3 - Able to walk 1-2 blocks without stopping   Hematologic:  - neg hematologic  ROS      (-) History of Transfusion   Musculoskeletal: Comment:  Osteopenia    Osteoarthritis in knees bilaterally. Right hip pain. Lumbar stenosis, LBP.  (+) arthritis,  -       GI/Hepatic:     (+) GERD Asymptomatic on medication, cholecystitis/cholelithiasis (s/p cholecystectomy),       Renal/Genitourinary: Comment: S/P nephroureterectomy 11/2018    (+) chronic renal disease, type: CRI, Nephrolithiasis , Pt does not require dialysis, Pt has no history of transplant,       Endo:     (+) thyroid problem (Graves disease)  hyperthyroidism, Obesity, .      Psychiatric:     (+) psychiatric history depression      Infectious Disease:  - neg infectious disease ROS       Malignancy:   (+) Malignancy History of Other  Skin CA status post Surgery, Other CA Urothelial cancer, bladder cancer status post Surgery and Chemo         Other:    (+) C-spine cleared: N/A, no H/O Chronic Pain,no other significant disability        The complete review of systems is negative other than noted in the HPI or here.      0 lbs 0 oz  Data Unavailable   There is no height or weight on file to calculate BMI.       Physical Exam    Please refer to the physical examination documented by the anesthesiologist in the anesthesia record on the day of surgery    Labs: (personally reviewed)  Component      Latest Ref Rng & Units 9/3/2020   WBC      4.0 - 11.0 10e9/L 11.2 (H)   RBC Count      3.8 - 5.2 10e12/L 4.08   Hemoglobin      11.7 - 15.7 g/dL 13.0   Hematocrit      35.0 - 47.0 % 40.2   MCV      78 - 100 fl 99   MCH      26.5 - 33.0 pg 31.9   MCHC      31.5 - 36.5 g/dL 32.3   RDW      10.0 - 15.0 % 12.8   Platelet Count      150 - 450 10e9/L 224   Diff Method       Automated Method   % Neutrophils      % 77.6   % Lymphocytes      % 12.4   % Monocytes      % 8.0   % Eosinophils      % 0.9   % Basophils      % 0.6   % Immature Granulocytes      % 0.5   Nucleated RBCs      0 /100 0   Absolute Neutrophil      1.6 - 8.3 10e9/L 8.7 (H)   Absolute Lymphocytes      0.8 - 5.3 10e9/L 1.4   Absolute Monocytes      0.0 - 1.3  10e9/L 0.9   Absolute Eosinophils      0.0 - 0.7 10e9/L 0.1   Absolute Basophils      0.0 - 0.2 10e9/L 0.1   Abs Immature Granulocytes      0 - 0.4 10e9/L 0.1   Absolute Nucleated RBC       0.0   Sodium      133 - 144 mmol/L 138   Potassium      3.4 - 5.3 mmol/L 4.4   Chloride      94 - 109 mmol/L 104   Carbon Dioxide      20 - 32 mmol/L 30   Anion Gap      3 - 14 mmol/L 4   Glucose      70 - 99 mg/dL 93   Urea Nitrogen      7 - 30 mg/dL 23   Creatinine      0.52 - 1.04 mg/dL 1.03   GFR Estimate      >60 mL/min/1.73:m2 49 (L)   GFR Estimate If Black      >60 mL/min/1.73:m2 57 (L)   Calcium      8.5 - 10.1 mg/dL 9.0   Bilirubin Total      0.2 - 1.3 mg/dL 0.4   Albumin      3.4 - 5.0 g/dL 3.6   Protein Total      6.8 - 8.8 g/dL 7.5   Alkaline Phosphatase      40 - 150 U/L 97   ALT      0 - 50 U/L 20   AST      0 - 45 U/L 16       EKG 7/20/20     SR with PACs, LAD and LVH          ECHO 9/2/20  Interpretation Summary  Global and regional left ventricular function is normal with an EF of 60-65%.  Grade II or moderate diastolic dysfunction.  The right ventricle is normal size.Global right ventricular function is  normal.  Moderate aortic stenosis is present. Mild aortic insufficiency is present.  This study was compared with the study from 09/11/2018. There has been no  change.    Outside records reviewed from: care everywhere    ASSESSMENT and PLAN  Dimple Oviedo is an 87 year old female scheduled for R TKA on 11/9/20 by Dr. Otero in treatment of OA.  PAC referral for risk assessment and optimization for anesthesia with comorbid conditions of hypertension, dyslipidemia, CHF, h/o a fib, RLS, GERD, anxiety, urothelial carcinoma, polymyalgia rheumatica:    Pre-operative considerations:  1.  Cardiac:  Functional status- METS 3- reports she has been walking laps [place] the [place] (using a walker). States she does about 3 laps at a time and thinks she can walk equivalent to one block before taking a break. Denies  cardiac symptoms. Previous cardiac testing as above.  H/o stress induced cardiomyopathy with reduced EF that has since resolved.  H/o paroxysmal atrial fibrillation without recent symptoms maintained on propranolol- most recent EKG showed SR with PACs. She is also using irbesartan for hypertension, lovastatin for dyslipidemia and lasix for edema.  She was seen by Dr. Griffith 10/14/20 and was approved for the above procedure at adequate risk from a cardiac perspective . She will see Dr. Griffith for follow up after surgery to discuss possibly restarting anticoagulation. intermediate risk surgery with 0.9% (RCRI #) risk of major adverse cardiac event.   2.  Pulm:   JESS risk: intermediate. Non smoker. Denies pulmonary symptoms. COVID testing ordered per surgery team.   3.  GI:  Risk of PONV score = 3.  If > 2, anti-emetic intervention recommended. GERD controlled using prilosec.   4.  Neuro: RLS using requip.   5. Endo: hyperthyroid using methimazole  6. Psych: anxiety and depression using zoloft and trazodone. States symptoms have improved.    7. : h/o urothelial carcinoma s/p surgery and chemotherapy. Most recent cystoscopy completed 8/26/2020. Patient states 3 infusions of maintenance BCG will be scheduled after knee replacement per her urology team.   8. Msk: right knee OA with above procedure planned. She has an appointment with Dr. Otero 10/28/20 to discuss upcoming surgery. We will complete preop labs (T&S and CBC) when she is in the building for that visit.   9. Access: patient reports h/o difficult IV access  10. ERAS initiated    VTE risk: 0.5%    Patient is optimized and is acceptable candidate for the proposed procedure.  No further diagnostic evaluation is needed.     Patient discussed with Dr. Conner PA-C  Preoperative Assessment Center  [facility]  Phone: [phone]  Fax: [phone]

## 2020-10-21 NOTE — TELEPHONE ENCOUNTER
[facility] Call Center    Phone Message    May a detailed message be left on voicemail: no     Reason for Call: Other: Patient would like a c/b from Lynn about upcoming surgery     Action Taken: Other: [facility]    Travel Screening: Not Applicable

## 2020-10-22 DIAGNOSIS — M17.11 PRIMARY OSTEOARTHRITIS OF RIGHT KNEE: Primary | ICD-10-CM

## 2020-10-23 ENCOUNTER — TELEPHONE (OUTPATIENT)
Dept: ORTHOPEDICS | Facility: CLINIC | Age: 85
End: 2020-10-23

## 2020-10-23 DIAGNOSIS — M17.11 PRIMARY OSTEOARTHRITIS OF RIGHT KNEE: Primary | ICD-10-CM

## 2020-10-23 NOTE — TELEPHONE ENCOUNTER
[facility] Call Center    Phone Message    May a detailed message be left on voicemail: yes     Reason for Call: Order(s): Home Care Orders: Physical Therapy (PT): Home Therapy     Action Taken: Message routed to:  [facility]    Travel Screening: Not Applicable    Patient would like to do Home PT -- she already check with insurance. She would like Dr. Otero to put in an order for Home PT.    Please call patient when orders are in

## 2020-10-27 ASSESSMENT — ENCOUNTER SYMPTOMS
STIFFNESS: 1
DYSPNEA ON EXERTION: 0
HYPOTENSION: 0
HALLUCINATIONS: 0
LIGHT-HEADEDNESS: 0
POSTURAL DYSPNEA: 0
POLYPHAGIA: 0
WEIGHT GAIN: 0
ARTHRALGIAS: 1
FATIGUE: 0
COUGH DISTURBING SLEEP: 0
COUGH: 0
HOARSE VOICE: 0
FEVER: 0
MYALGIAS: 0
ORTHOPNEA: 0
SLEEP DISTURBANCES DUE TO BREATHING: 0
WEIGHT LOSS: 0
CHILLS: 0
BACK PAIN: 1
HEMOPTYSIS: 0
HYPERTENSION: 1
DOUBLE VISION: 0
SHORTNESS OF BREATH: 0
SNORES LOUDLY: 0
SINUS CONGESTION: 0
POLYDIPSIA: 0
EYE REDNESS: 0
DIFFICULTY URINATING: 0
INCREASED ENERGY: 0
NECK PAIN: 0
LEG PAIN: 1
EYE PAIN: 0
MUSCLE WEAKNESS: 0
EYE WATERING: 0
SMELL DISTURBANCE: 0
DECREASED APPETITE: 0
WHEEZING: 0
JOINT SWELLING: 1
ALTERED TEMPERATURE REGULATION: 0
PALPITATIONS: 0
SPUTUM PRODUCTION: 0
SINUS PAIN: 0
NECK MASS: 0
TASTE DISTURBANCE: 0
EXERCISE INTOLERANCE: 0
EYE IRRITATION: 0
SORE THROAT: 0
DYSURIA: 0
FLANK PAIN: 0
NIGHT SWEATS: 0
HEMATURIA: 0
MUSCLE CRAMPS: 0

## 2020-10-28 ENCOUNTER — ANCILLARY PROCEDURE (OUTPATIENT)
Dept: GENERAL RADIOLOGY | Facility: CLINIC | Age: 85
End: 2020-10-28
Attending: ORTHOPAEDIC SURGERY
Payer: MEDICARE

## 2020-10-28 ENCOUNTER — OFFICE VISIT (OUTPATIENT)
Dept: ORTHOPEDICS | Facility: CLINIC | Age: 85
End: 2020-10-28
Payer: MEDICARE

## 2020-10-28 DIAGNOSIS — Z01.818 PRE-OP EVALUATION: ICD-10-CM

## 2020-10-28 DIAGNOSIS — M17.11 PRIMARY LOCALIZED OSTEOARTHRITIS OF RIGHT KNEE: ICD-10-CM

## 2020-10-28 DIAGNOSIS — M17.11 PRIMARY LOCALIZED OSTEOARTHRITIS OF RIGHT KNEE: Primary | ICD-10-CM

## 2020-10-28 LAB
ABO + RH BLD: NORMAL
ABO + RH BLD: NORMAL
BLD GP AB SCN SERPL QL: NORMAL
BLOOD BANK CMNT PATIENT-IMP: NORMAL
ERYTHROCYTE [DISTWIDTH] IN BLOOD BY AUTOMATED COUNT: 13.2 % (ref 10–15)
HCT VFR BLD AUTO: 40.4 % (ref 35–47)
HGB BLD-MCNC: 13 G/DL (ref 11.7–15.7)
MCH RBC QN AUTO: 31.4 PG (ref 26.5–33)
MCHC RBC AUTO-ENTMCNC: 32.2 G/DL (ref 31.5–36.5)
MCV RBC AUTO: 98 FL (ref 78–100)
PLATELET # BLD AUTO: 218 10E9/L (ref 150–450)
RBC # BLD AUTO: 4.14 10E12/L (ref 3.8–5.2)
SPECIMEN EXP DATE BLD: NORMAL
WBC # BLD AUTO: 6.4 10E9/L (ref 4–11)

## 2020-10-28 PROCEDURE — 77073 BONE LENGTH STUDIES: CPT | Performed by: RADIOLOGY

## 2020-10-28 PROCEDURE — 36415 COLL VENOUS BLD VENIPUNCTURE: CPT | Performed by: PATHOLOGY

## 2020-10-28 PROCEDURE — 86900 BLOOD TYPING SEROLOGIC ABO: CPT | Mod: 90 | Performed by: PATHOLOGY

## 2020-10-28 PROCEDURE — 85027 COMPLETE CBC AUTOMATED: CPT | Performed by: PATHOLOGY

## 2020-10-28 PROCEDURE — 86850 RBC ANTIBODY SCREEN: CPT | Mod: 90 | Performed by: PATHOLOGY

## 2020-10-28 PROCEDURE — 86901 BLOOD TYPING SEROLOGIC RH(D): CPT | Mod: 90 | Performed by: PATHOLOGY

## 2020-10-28 PROCEDURE — 99214 OFFICE O/P EST MOD 30 MIN: CPT | Performed by: ORTHOPAEDIC SURGERY

## 2020-10-28 NOTE — NURSING NOTE
Reason For Visit:   Chief Complaint   Patient presents with     RECHECK     Right knee surgical discussion        Primary MD: Pop Zapien  Referring MD: No ref. provider found          There were no vitals taken for this visit.    Pain Assessment  Patient Currently in Pain: Yes  0-10 Pain Scale: 3  Primary Pain Location: Knee  Pain Descriptors: Discomfort  Aggravating Factors: Walking    Current Outpatient Medications   Medication     acetaminophen (TYLENOL) 650 MG CR tablet     alendronate (FOSAMAX) 70 MG tablet     aspirin 81 MG tablet     Calcium Citrate-Vitamin D (CALCIUM + D PO)     cycloSPORINE (RESTASIS) 0.05 % ophthalmic emulsion     ferrous sulfate 325 (65 Fe) MG TBEC EC tablet     furosemide (LASIX) 20 MG tablet     irbesartan (AVAPRO) 300 MG tablet     lovastatin (MEVACOR) 40 MG tablet     methimazole (TAPAZOLE) 5 MG tablet     Multiple Vitamins-Minerals (PRESERVISION AREDS 2 PO)     nitroGLYcerin (NITROSTAT) 0.4 MG sublingual tablet     Omega-3 Fatty Acids (FISH OIL) 500 MG CAPS     omeprazole (PRILOSEC) 20 MG DR capsule     Probiotic Product (PROBIOTIC ADVANCED PO)     propranolol ER (INDERAL LA) 60 MG 24 hr capsule     rOPINIRole (REQUIP) 0.25 MG tablet     sertraline (ZOLOFT) 100 MG tablet     traZODone (DESYREL) 50 MG tablet     No current facility-administered medications for this visit.           Allergies   Allergen Reactions     Shauna Britton LPN

## 2020-10-28 NOTE — LETTER
10/28/2020         RE: Dimple Oviedo  [address]  [place] [address]        Dear Colleague,    Thank you for referring your patient, Dimple Oviedo, to the [facility] [place]. Please see a copy of my visit note below.    [facility], Orthopaedic Surgery Consultation    Dimple Oviedo MRN# [id number]   Age: 86 year old YOB: 1933     Reason for consult: Right knee pain       Requesting physician: MD Curry (PCP)         Assessment and Plan:   Assessment:    Dimpleis a very pleasant 87-year-old woman with end-stage bone-on-bone right knee osteoarthritis.  We had a long discussion regarding ongoing management options.  We reviewed total knee replacement detail including the surgery, implants, recovery process, long-term outcomes.  We reviewed the risks and benefits of surgery.  Due to the impact it is having on her life we believe the benefits of surgery outweigh the risks and she would like to proceed with this.  We discussed sizing additional risks due to her chemotherapy as well as her cardiac function.  Nonetheless her symptoms are severe and we believe that the benefits outweigh these risks.  We will plan to proceed with the right total knee replacement as scheduled.  She will let us know if she has any questions or concerns aching up to surgery.      KYLIE Otero.     Arthritis and Joint Replacement  Department of Orthopaedic Surgery, [facility]  [email or web address]  [phone] (pager)              History of Present Illness:     Dimple   Returns to clinic today for ongoing management of her right knee osteoarthritis.  I last saw her many months ago and we elected to proceed with total knee replacement.  Unfortunately this has been postponed due to medical comorbidities.  At this point her cardiac function and her chemotherapy dosing has been optimized and she is optimized for the total knee replacement.  She  notes that her right knee remains severely painful.  It severely limits her normal activities of daily living and is quite stiff.  She is unable to fully  Straightened the knee.  It is severely impacting her quality of life and she is not able to do normal day-to-day activities due to the pain and limitations in her knee.         Past Medical History:     Past Medical History:   Diagnosis Date     Calculus of kidney      Chemotherapy-induced neutropenia (H) 3/6/2019     Esophageal reflux      GERD (gastroesophageal reflux disease)      Hyperlipidemia LDL goal <130 5/9/2010     Malignant melanoma of skin of trunk, except scrotum (H)      Nonspecific abnormal finding     has living will 2004 -      Nontoxic multinodular goiter     no further eval /tx rec per pt     Osteopenia      Other psoriasis      Personal history of colonic polyps      PMR (polymyalgia rheumatica) (H)      Stress-induced cardiomyopathy      Undiagnosed cardiac murmurs      Unspecified constipation      Unspecified essential hypertension      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             Past Surgical History:     Past Surgical History:   Procedure Laterality Date     BIOPSY       COLONOSCOPY  2014     COMBINED CYSTOSCOPY, INSERT STENT URETER(S) Bilateral 9/12/2018    Procedure: COMBINED CYSTOSCOPY, INSERT STENT URETER(S);  Cystoscopy Bilateral ureteral Stent Placement.;  Surgeon: Justin Henry MD;  [facility]     COMBINED CYSTOSCOPY, RETROGRADES, URETEROSCOPY, INSERT STENT Bilateral 4/3/2018    Procedure: COMBINED CYSTOSCOPY, RETROGRADES, URETEROSCOPY, INSERT STENT;;  Surgeon: Stuart King MD;  [facility]     COMBINED CYSTOSCOPY, RETROGRADES, URETEROSCOPY, INSERT STENT Bilateral 9/10/2018    Procedure: COMBINED CYSTOSCOPY, RETROGRADES, URETEROSCOPY, INSERT STENT;  Cystoscopy, Bilateral Ureteroscopy, Bladder Biopsies, Retrogram Pyelograms, Ureteral Washings and brushings, cysview;  Surgeon: Stuart King  MD;  [facility]     COMBINED CYSTOSCOPY, RETROGRADES, URETEROSCOPY, INSERT STENT Left 8/26/2020    Procedure: Cystoscopy, Left Ureteral Wash, Left ureteral Retrograde Pyelogram;  Surgeon: Justin Henry MD;  [facility]     CYSTOSCOPY, BIOPSY BLADDER INSTILL OPTICAL AGENT N/A 4/3/2018    Procedure: CYSTOSCOPY, BIOPSY BLADDER INSTILL OPTICAL AGENT;  Cystoscopy, Blue Light Cystoscopy, Bladder Biopsies, Bilateral Selective ureteral washings for Cytology, Bilateral Retrograde Pyelograms, Bilateral Ureteroscopy;  Surgeon: Stuart King MD;  [facility]     CYSTOSCOPY, BIOPSY BLADDER, COMBINED N/A 2/19/2018    Procedure: COMBINED CYSTOSCOPY, BIOPSY BLADDER;  Cystoscopy, Bladder Biopsy;  Surgeon: Kenna La MD;  [facility]     CYSTOSCOPY, BIOPSY BLADDER, COMBINED N/A 8/26/2020    Procedure: Cystoscopy, biopsy bladder, combined;  Surgeon: Justin Henry MD;  [facility]     CYSTOSCOPY, FULGURATE BLADDER TUMOR, COMBINED N/A 1/13/2020    Procedure: CYSTOSCOPY, WITH  bladder biopsies and fulguration;  Surgeon: Stuart King MD;  [facility]     CYSTOSCOPY, REMOVE STENT(S), COMBINED  11/13/2018    Procedure: Flexible Cystoscopy with Stent Removal;  Surgeon: Stuart King MD;  [facility]     DAVINCI LYMPHADENECTOMY N/A 11/13/2018    Procedure: Davinci Lymphadenectomy ;  Surgeon: Stuart King MD;  [facility]     DAVINCI NEPHROURETERECTOMY N/A 11/13/2018    Procedure: Right DaVinci Assisted Nephroureterectomy;  Surgeon: Stuart King MD;  [facility]     ENDOSCOPIC ULTRASOUND LOWER GASTROINTESTIONAL TRACT (GI) N/A 10/30/2015    Procedure: ENDOSCOPIC ULTRASOUND LOWER GASTROINTESTIONAL TRACT (GI);  Surgeon: Daniel Jean-Baptiste MD;  [facility]     EYE SURGERY  12/4/17     INSERT PORT VASCULAR ACCESS Right 12/19/2018    Procedure: Chest Port Placement - right;  Surgeon: Stuart Chavez PA-C;   [facility]     IR CHEST PORT PLACEMENT > 5 YRS OF AGE  12/19/2018     IR PORT REMOVAL RIGHT  6/26/2019     LAPAROSCOPIC CHOLECYSTECTOMY WITH CHOLANGIOGRAMS N/A 11/1/2015    Procedure: LAPAROSCOPIC CHOLECYSTECTOMY WITH CHOLANGIOGRAMS;  Surgeon: Tonie Warren MD;  [facility]     REMOVE PORT VASCULAR ACCESS Right 6/26/2019    Procedure: Right Port Removal;  Surgeon: Froilan Irizarry PA-C;  [facility]     SURGICAL HISTORY OF -   1996    malignant melanoma     SURGICAL HISTORY OF -   1968    thyroid nodule     SURGICAL HISTORY OF -       D & C     ZZC NONSPECIFIC PROCEDURE  2005    colonoscopy polyp repeat 2010             Social History:     Social History     Socioeconomic History     Marital status:      Spouse name:      Number of children: 2     Years of education: Not on file     Highest education level: Not on file   Occupational History     Employer: RETIRED   Social Needs     Financial resource strain: Not on file     Food insecurity:     Worry: Not on file     Inability: Not on file     Transportation needs:     Medical: Not on file     Non-medical: Not on file   Tobacco Use     Smoking status: Never Smoker     Smokeless tobacco: Never Used   Substance and Sexual Activity     Alcohol use: No     Alcohol/week: 0.0 standard drinks     Drug use: No     Sexual activity: Yes     Partners: Male   Lifestyle     Physical activity:     Days per week: Not on file     Minutes per session: Not on file     Stress: Not on file   Relationships     Social connections:     Talks on phone: Not on file     Gets together: Not on file     Attends Jewish service: Not on file     Active member of club or organization: Not on file     Attends meetings of clubs or organizations: Not on file     Relationship status: Not on file     Intimate partner violence:     Fear of current or ex partner: Not on file     Emotionally abused: Not on file     Physically abused: Not on file     Forced sexual  activity: Not on file   Other Topics Concern      Service No     Blood Transfusions No     Caffeine Concern Not Asked     Occupational Exposure Not Asked     Hobby Hazards Not Asked     Sleep Concern Not Asked     Stress Concern Not Asked     Weight Concern Not Asked     Special Diet Not Asked     Back Care Not Asked     Exercise Yes     Comment: curves     Bike Helmet Not Asked     Seat Belt Yes     Self-Exams Yes     Parent/sibling w/ CABG, MI or angioplasty before 65F 55M? No   Social History Narrative    December 7, 2009    Balanced Diet - Yes    Osteoporosis Prevention Measures - Dairy servings per day: 2 and Medication/Supplements (See current meds)    Regular Exercise -  Yes Describe curves, walking    Dental Exam - YES - Date: 10/2009    Eye Exam - YES - Date: 2008    Self Breast Exam - Yes    Abuse: Current or Past (Physical, Sexual or Emotional)- No    Do you feel safe in your environment - Yes    Guns stored in the home - No    Sunscreen used - Yes    Seatbelts used - Yes    Lipids -  YES - Date: 11/24/2009    Glucose -  YES - Date: 11/24/2009    Colon Cancer Screening - Colonoscopy 11/18/2005(date completed)    Hemoccults - YES - Date: 10/12/2004    Pap Test -  YES - Date: 09/22/2004    Do you have any concerns about STD's -  No    Mammography - YES - Date: 11/24/2009    DEXA - YES - Date: 11/20/2008    Immunizations reviewed and up to date - Yes    PAULETTE [facility]                     Family History:     Family History   Problem Relation Age of Onset     Cancer Father         dec - esophageal and laryngeal     Heart Disease Mother      Respiratory Mother         dec     Breast Cancer Daughter      Other Cancer Daughter      Thyroid Disease Daughter      Asthma Daughter      Hyperlipidemia Son      Diabetes Son      Anesthesia Reaction No family hx of      Deep Vein Thrombosis (DVT) No family hx of               Medications:     Current Outpatient Medications   Medication Sig     acetaminophen  (TYLENOL) 650 MG CR tablet Take 1 tablet (650 mg) by mouth every 8 hours as needed for pain (Patient taking differently: Take 1,300 mg by mouth 2 times daily as needed for pain )     alendronate (FOSAMAX) 70 MG tablet TAKE 1 TABLET EVERY 7 DAYS AT LEAST 60 MINUTES BEFORE BREAKFAST AS DIRECTED. (Patient taking differently: Take 70 mg by mouth every 7 days TAKE 1 TABLET EVERY 7 DAYS AT LEAST 60 MINUTES BEFORE BREAKFAST AS DIRECTED.)     aspirin 81 MG tablet Take 81 mg by mouth every evening PT last dose 8.19.2020     Calcium Citrate-Vitamin D (CALCIUM + D PO) Take 1 tablet by mouth every morning      cycloSPORINE (RESTASIS) 0.05 % ophthalmic emulsion Place 1 drop into both eyes 2 times daily     ferrous sulfate 325 (65 Fe) MG TBEC EC tablet Take 325 mg by mouth every morning      furosemide (LASIX) 20 MG tablet TAKE 1 TABLET (20 MG) BY MOUTH EVERY MORNING     irbesartan (AVAPRO) 300 MG tablet TAKE 1 TABLET EVERY DAY (Patient taking differently: Take 300 mg by mouth every morning TAKE 1 TABLET EVERY DAY)     lovastatin (MEVACOR) 40 MG tablet TAKE 1 TABLET AT BEDTIME (HYPERLIPIDEMIA LDL GOAL BELOW 130) (Patient taking differently: At Bedtime TAKE 1 TABLET AT BEDTIME (HYPERLIPIDEMIA LDL GOAL BELOW 130))     methimazole (TAPAZOLE) 5 MG tablet Take 1 tablet (5 mg) by mouth daily (Patient taking differently: Take 5 mg by mouth every morning )     Multiple Vitamins-Minerals (PRESERVISION AREDS 2 PO) Take 1 tablet by mouth every morning     nitroGLYcerin (NITROSTAT) 0.4 MG sublingual tablet For chest pain place 1 tablet under the tongue every 5 minutes for 3 doses. If symptoms persist 5 minutes after 1st dose call 911.     Omega-3 Fatty Acids (FISH OIL) 500 MG CAPS Take 1 capsule by mouth every morning      omeprazole (PRILOSEC) 20 MG DR capsule TAKE 1 CAPSULE EVERY DAY (Patient taking differently: Take 20 mg by mouth every morning TAKE 1 CAPSULE EVERY DAY)     Probiotic Product (PROBIOTIC ADVANCED PO) Take 1 capsule by mouth  every morning      propranolol ER (INDERAL LA) 60 MG 24 hr capsule TAKE 1 CAPSULE EVERY MORNING (Patient taking differently: every morning )     rOPINIRole (REQUIP) 0.25 MG tablet TAKE 1 TABLET IN THE AFTERNOON AND TAKE 2 TABLETS EVERY NIGHT (Patient taking differently: Take by mouth 2 times daily TAKE 1 TABLET IN THE AFTERNOON AND TAKE 2 TABLETS EVERY NIGHT )     sertraline (ZOLOFT) 100 MG tablet TAKE 1 TABLET (100 MG) BY MOUTH EVERY MORNING     traZODone (DESYREL) 50 MG tablet TAKE 1/2 TABLET AT BEDTIME (Patient taking differently: At Bedtime TAKE 1/2 TABLET AT BEDTIME)     No current facility-administered medications for this visit.              Allergies:      Allergies   Allergen Reactions     Codeine                Physical Exam:   COMPLETE EXAMINATION:   VITAL SIGNS: There were no vitals taken for this visit.  RESP: Non labored breathing  SKIN: Grossly normal   MUSCULOSKELETAL:     No overlying skin changes or warmth to touch  She is stable to varus and valgus stress with some passive correction on varus stress  Range of motion 15 to 115 degrees with some mild crepitus  Sensorimotor exam distally is fully intact  Toes are warm and well-perfused and she has palpable pulse  Sensation is fully intact distally without any reported tingling or numbness          Data:   Radiographic evaluation of her bilateral lower extremities and bilateral knees demonstrate advanced tricompartmental osteoarthritis with significant osteophytosis and subchondral sclerosis.      Answers for HPI/ROS submitted by the patient on 10/27/2020   General Symptoms: Yes  Skin Symptoms: No  HENT Symptoms: Yes  EYE SYMPTOMS: Yes  HEART SYMPTOMS: Yes  LUNG SYMPTOMS: Yes  INTESTINAL SYMPTOMS: No  URINARY SYMPTOMS: Yes  GYNECOLOGIC SYMPTOMS: No  BREAST SYMPTOMS: No  SKELETAL SYMPTOMS: Yes  BLOOD SYMPTOMS: No  NERVOUS SYSTEM SYMPTOMS: No  MENTAL HEALTH SYMPTOMS: No  Fever: No  Loss of appetite: No  Weight loss: No  Weight gain: No  Fatigue:  No  Night sweats: No  Chills: No  Increased stress: No  Excessive hunger: No  Excessive thirst: No  Feeling hot or cold when others believe the temperature is normal: No  Loss of height: No  Post-operative complications: No  Surgical site pain: Yes  Hallucinations: No  Change in or Loss of Energy: No  Hyperactivity: No  Confusion: No  Ear pain: No  Ear discharge: No  Hearing loss: Yes  Tinnitus: No  Nosebleeds: No  Congestion: No  Sinus pain: No   Voice hoarseness: No  Mouth sores: No  Sore throat: No  Tooth pain: No  Gum tenderness: No  Bleeding gums: No  Change in taste: No  Change in sense of smell: No  Dry mouth: No  Hearing aid used: Yes  Neck lump: No  Eye pain: No  Vision loss: No  Dry eyes: Yes  Watery eyes: No  Eye bulging: No  Double vision: No  Flashing of lights: No  Spots: No  Floaters: No  Redness: No  Crossed eyes: No  Tunnel Vision: No  Yellowing of eyes: No  Eye irritation: No  Cough: No  Sputum or phlegm: No  Coughing up blood: No  Difficulty breating or shortness of breath: No  Snoring: No  Wheezing: No  Difficulty breathing on exertion: No  Nighttime Cough: No  Difficulty breathing when lying flat: No  Chest pain or pressure: No  Fast or irregular heartbeat: No  Pain in legs with walking: Yes  Trouble breathing while lying down: No  Fingers or toes appear blue: No  High blood pressure: Yes  Low blood pressure: No  Murmurs: No  Pacemaker: No  Edema or swelling: Yes  Wake up at night with shortness of breath: No  Light-headedness: No  Exercise intolerance: No  Trouble holding urine or incontinence: Yes  Pain or burning: No  Trouble starting or stopping: No  Increased frequency of urination: Yes  Blood in urine: No  Decreased frequency of urination: No  Frequent nighttime urination: Yes  Flank pain: No  Difficulty emptying bladder: No  Back pain: Yes  Muscle aches: No  Neck pain: No  Swollen joints: Yes  Joint pain: Yes  Bone pain: No  Muscle cramps: No  Muscle weakness: No  Joint stiffness:  Yes  Bone fracture: No

## 2020-10-29 NOTE — PROGRESS NOTES
[facility] Physicians, Orthopaedic Surgery Consultation    Dimple Oviedo MRN# [id number]   Age: 86 year old YOB: 1933     Reason for consult: Right knee pain       Requesting physician: MD Curry (PCP)         Assessment and Plan:   Assessment:    Dimpleis a very pleasant 87-year-old woman with end-stage bone-on-bone right knee osteoarthritis.  We had a long discussion regarding ongoing management options.  We reviewed total knee replacement detail including the surgery, implants, recovery process, long-term outcomes.  We reviewed the risks and benefits of surgery.  Due to the impact it is having on her life we believe the benefits of surgery outweigh the risks and she would like to proceed with this.  We discussed sizing additional risks due to her chemotherapy as well as her cardiac function.  Nonetheless her symptoms are severe and we believe that the benefits outweigh these risks.  We will plan to proceed with the right total knee replacement as scheduled.  She will let us know if she has any questions or concerns aching up to surgery.      KYLIE Otero.     Arthritis and Joint Replacement  Department of Orthopaedic Surgery, [facility]  [email or web address]  [phone] (pager)              History of Present Illness:     Dimple   Returns to clinic today for ongoing management of her right knee osteoarthritis.  I last saw her many months ago and we elected to proceed with total knee replacement.  Unfortunately this has been postponed due to medical comorbidities.  At this point her cardiac function and her chemotherapy dosing has been optimized and she is optimized for the total knee replacement.  She notes that her right knee remains severely painful.  It severely limits her normal activities of daily living and is quite stiff.  She is unable to fully  Straightened the knee.  It is severely impacting her quality of life and she is not able to do normal day-to-day  activities due to the pain and limitations in her knee.         Past Medical History:     Past Medical History:   Diagnosis Date     Calculus of kidney      Chemotherapy-induced neutropenia (H) 3/6/2019     Esophageal reflux      GERD (gastroesophageal reflux disease)      Hyperlipidemia LDL goal <130 5/9/2010     Malignant melanoma of skin of trunk, except scrotum (H)      Nonspecific abnormal finding     has living will 2004 -      Nontoxic multinodular goiter     no further eval /tx rec per pt     Osteopenia      Other psoriasis      Personal history of colonic polyps      PMR (polymyalgia rheumatica) (H)      Stress-induced cardiomyopathy      Undiagnosed cardiac murmurs      Unspecified constipation      Unspecified essential hypertension      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             Past Surgical History:     Past Surgical History:   Procedure Laterality Date     BIOPSY       COLONOSCOPY  2014     COMBINED CYSTOSCOPY, INSERT STENT URETER(S) Bilateral 9/12/2018    Procedure: COMBINED CYSTOSCOPY, INSERT STENT URETER(S);  Cystoscopy Bilateral ureteral Stent Placement.;  Surgeon: Justin Henry MD;  [facility]     COMBINED CYSTOSCOPY, RETROGRADES, URETEROSCOPY, INSERT STENT Bilateral 4/3/2018    Procedure: COMBINED CYSTOSCOPY, RETROGRADES, URETEROSCOPY, INSERT STENT;;  Surgeon: Stuart King MD;  [facility]     COMBINED CYSTOSCOPY, RETROGRADES, URETEROSCOPY, INSERT STENT Bilateral 9/10/2018    Procedure: COMBINED CYSTOSCOPY, RETROGRADES, URETEROSCOPY, INSERT STENT;  Cystoscopy, Bilateral Ureteroscopy, Bladder Biopsies, Retrogram Pyelograms, Ureteral Washings and brushings, cysview;  Surgeon: Stuart King MD;  [facility]     COMBINED CYSTOSCOPY, RETROGRADES, URETEROSCOPY, INSERT STENT Left 8/26/2020    Procedure: Cystoscopy, Left Ureteral Wash, Left ureteral Retrograde Pyelogram;  Surgeon: Justin Henry MD;  [facility]     CYSTOSCOPY, BIOPSY BLADDER  INSTILL OPTICAL AGENT N/A 4/3/2018    Procedure: CYSTOSCOPY, BIOPSY BLADDER INSTILL OPTICAL AGENT;  Cystoscopy, Blue Light Cystoscopy, Bladder Biopsies, Bilateral Selective ureteral washings for Cytology, Bilateral Retrograde Pyelograms, Bilateral Ureteroscopy;  Surgeon: Stuart King MD;  [facility]     CYSTOSCOPY, BIOPSY BLADDER, COMBINED N/A 2/19/2018    Procedure: COMBINED CYSTOSCOPY, BIOPSY BLADDER;  Cystoscopy, Bladder Biopsy;  Surgeon: Kenna La MD;  [facility]     CYSTOSCOPY, BIOPSY BLADDER, COMBINED N/A 8/26/2020    Procedure: Cystoscopy, biopsy bladder, combined;  Surgeon: Justin Henry MD;  [facility]     CYSTOSCOPY, FULGURATE BLADDER TUMOR, COMBINED N/A 1/13/2020    Procedure: CYSTOSCOPY, WITH  bladder biopsies and fulguration;  Surgeon: Stuart King MD;  [facility]     CYSTOSCOPY, REMOVE STENT(S), COMBINED  11/13/2018    Procedure: Flexible Cystoscopy with Stent Removal;  Surgeon: Stuart King MD;  [facility]     DAVINCI LYMPHADENECTOMY N/A 11/13/2018    Procedure: Davinci Lymphadenectomy ;  Surgeon: Stuart King MD;  [facility]     DAVINCI NEPHROURETERECTOMY N/A 11/13/2018    Procedure: Right DaVinci Assisted Nephroureterectomy;  Surgeon: Stuart King MD;  [facility]     ENDOSCOPIC ULTRASOUND LOWER GASTROINTESTIONAL TRACT (GI) N/A 10/30/2015    Procedure: ENDOSCOPIC ULTRASOUND LOWER GASTROINTESTIONAL TRACT (GI);  Surgeon: Daniel Jean-Baptiste MD;  [facility]     EYE SURGERY  12/4/17     INSERT PORT VASCULAR ACCESS Right 12/19/2018    Procedure: Chest Port Placement - right;  Surgeon: Stuart Chavez PA-C;  [facility]     IR CHEST PORT PLACEMENT > 5 YRS OF AGE  12/19/2018     IR PORT REMOVAL RIGHT  6/26/2019     LAPAROSCOPIC CHOLECYSTECTOMY WITH CHOLANGIOGRAMS N/A 11/1/2015    Procedure: LAPAROSCOPIC CHOLECYSTECTOMY WITH CHOLANGIOGRAMS;  Surgeon: Tonie Warren  MD Lian;  [facility]     REMOVE PORT VASCULAR ACCESS Right 6/26/2019    Procedure: Right Port Removal;  Surgeon: Froilan Irizarry PA-C;  [facility]     SURGICAL HISTORY OF -   1996    malignant melanoma     SURGICAL HISTORY OF -   1968    thyroid nodule     SURGICAL HISTORY OF -       D & C     ZZC NONSPECIFIC PROCEDURE  2005    colonoscopy polyp repeat 2010             Social History:     Social History     Socioeconomic History     Marital status:      Spouse name:      Number of children: 2     Years of education: Not on file     Highest education level: Not on file   Occupational History     Employer: RETIRED   Social Needs     Financial resource strain: Not on file     Food insecurity:     Worry: Not on file     Inability: Not on file     Transportation needs:     Medical: Not on file     Non-medical: Not on file   Tobacco Use     Smoking status: Never Smoker     Smokeless tobacco: Never Used   Substance and Sexual Activity     Alcohol use: No     Alcohol/week: 0.0 standard drinks     Drug use: No     Sexual activity: Yes     Partners: Male   Lifestyle     Physical activity:     Days per week: Not on file     Minutes per session: Not on file     Stress: Not on file   Relationships     Social connections:     Talks on phone: Not on file     Gets together: Not on file     Attends Sikh service: Not on file     Active member of club or organization: Not on file     Attends meetings of clubs or organizations: Not on file     Relationship status: Not on file     Intimate partner violence:     Fear of current or ex partner: Not on file     Emotionally abused: Not on file     Physically abused: Not on file     Forced sexual activity: Not on file   Other Topics Concern      Service No     Blood Transfusions No     Caffeine Concern Not Asked     Occupational Exposure Not Asked     Hobby Hazards Not Asked     Sleep Concern Not Asked     Stress Concern Not Asked     Weight Concern  Not Asked     Special Diet Not Asked     Back Care Not Asked     Exercise Yes     Comment: curves     Bike Helmet Not Asked     Seat Belt Yes     Self-Exams Yes     Parent/sibling w/ CABG, MI or angioplasty before 65F 55M? No   Social History Narrative    December 7, 2009    Balanced Diet - Yes    Osteoporosis Prevention Measures - Dairy servings per day: 2 and Medication/Supplements (See current meds)    Regular Exercise -  Yes Describe curves, walking    Dental Exam - YES - Date: 10/2009    Eye Exam - YES - Date: 2008    Self Breast Exam - Yes    Abuse: Current or Past (Physical, Sexual or Emotional)- No    Do you feel safe in your environment - Yes    Guns stored in the home - No    Sunscreen used - Yes    Seatbelts used - Yes    Lipids -  YES - Date: 11/24/2009    Glucose -  YES - Date: 11/24/2009    Colon Cancer Screening - Colonoscopy 11/18/2005(date completed)    Hemoccults - YES - Date: 10/12/2004    Pap Test -  YES - Date: 09/22/2004    Do you have any concerns about STD's -  No    Mammography - YES - Date: 11/24/2009    DEXA - YES - Date: 11/20/2008    Immunizations reviewed and up to date - Yes    PAULETTE Spencer, [facility]                     Family History:     Family History   Problem Relation Age of Onset     Cancer Father         dec - esophageal and laryngeal     Heart Disease Mother      Respiratory Mother         dec     Breast Cancer Daughter      Other Cancer Daughter      Thyroid Disease Daughter      Asthma Daughter      Hyperlipidemia Son      Diabetes Son      Anesthesia Reaction No family hx of      Deep Vein Thrombosis (DVT) No family hx of               Medications:     Current Outpatient Medications   Medication Sig     acetaminophen (TYLENOL) 650 MG CR tablet Take 1 tablet (650 mg) by mouth every 8 hours as needed for pain (Patient taking differently: Take 1,300 mg by mouth 2 times daily as needed for pain )     alendronate (FOSAMAX) 70 MG tablet TAKE 1 TABLET EVERY 7 DAYS AT LEAST 60 MINUTES  BEFORE BREAKFAST AS DIRECTED. (Patient taking differently: Take 70 mg by mouth every 7 days TAKE 1 TABLET EVERY 7 DAYS AT LEAST 60 MINUTES BEFORE BREAKFAST AS DIRECTED.)     aspirin 81 MG tablet Take 81 mg by mouth every evening PT last dose 8.19.2020     Calcium Citrate-Vitamin D (CALCIUM + D PO) Take 1 tablet by mouth every morning      cycloSPORINE (RESTASIS) 0.05 % ophthalmic emulsion Place 1 drop into both eyes 2 times daily     ferrous sulfate 325 (65 Fe) MG TBEC EC tablet Take 325 mg by mouth every morning      furosemide (LASIX) 20 MG tablet TAKE 1 TABLET (20 MG) BY MOUTH EVERY MORNING     irbesartan (AVAPRO) 300 MG tablet TAKE 1 TABLET EVERY DAY (Patient taking differently: Take 300 mg by mouth every morning TAKE 1 TABLET EVERY DAY)     lovastatin (MEVACOR) 40 MG tablet TAKE 1 TABLET AT BEDTIME (HYPERLIPIDEMIA LDL GOAL BELOW 130) (Patient taking differently: At Bedtime TAKE 1 TABLET AT BEDTIME (HYPERLIPIDEMIA LDL GOAL BELOW 130))     methimazole (TAPAZOLE) 5 MG tablet Take 1 tablet (5 mg) by mouth daily (Patient taking differently: Take 5 mg by mouth every morning )     Multiple Vitamins-Minerals (PRESERVISION AREDS 2 PO) Take 1 tablet by mouth every morning     nitroGLYcerin (NITROSTAT) 0.4 MG sublingual tablet For chest pain place 1 tablet under the tongue every 5 minutes for 3 doses. If symptoms persist 5 minutes after 1st dose call 911.     Omega-3 Fatty Acids (FISH OIL) 500 MG CAPS Take 1 capsule by mouth every morning      omeprazole (PRILOSEC) 20 MG DR capsule TAKE 1 CAPSULE EVERY DAY (Patient taking differently: Take 20 mg by mouth every morning TAKE 1 CAPSULE EVERY DAY)     Probiotic Product (PROBIOTIC ADVANCED PO) Take 1 capsule by mouth every morning      propranolol ER (INDERAL LA) 60 MG 24 hr capsule TAKE 1 CAPSULE EVERY MORNING (Patient taking differently: every morning )     rOPINIRole (REQUIP) 0.25 MG tablet TAKE 1 TABLET IN THE AFTERNOON AND TAKE 2 TABLETS EVERY NIGHT (Patient taking  differently: Take by mouth 2 times daily TAKE 1 TABLET IN THE AFTERNOON AND TAKE 2 TABLETS EVERY NIGHT )     sertraline (ZOLOFT) 100 MG tablet TAKE 1 TABLET (100 MG) BY MOUTH EVERY MORNING     traZODone (DESYREL) 50 MG tablet TAKE 1/2 TABLET AT BEDTIME (Patient taking differently: At Bedtime TAKE 1/2 TABLET AT BEDTIME)     No current facility-administered medications for this visit.              Allergies:      Allergies   Allergen Reactions     Codeine                Physical Exam:   COMPLETE EXAMINATION:   VITAL SIGNS: There were no vitals taken for this visit.  RESP: Non labored breathing  SKIN: Grossly normal   MUSCULOSKELETAL:     No overlying skin changes or warmth to touch  She is stable to varus and valgus stress with some passive correction on varus stress  Range of motion 15 to 115 degrees with some mild crepitus  Sensorimotor exam distally is fully intact  Toes are warm and well-perfused and she has palpable pulse  Sensation is fully intact distally without any reported tingling or numbness          Data:   Radiographic evaluation of her bilateral lower extremities and bilateral knees demonstrate advanced tricompartmental osteoarthritis with significant osteophytosis and subchondral sclerosis.      Answers for HPI/ROS submitted by the patient on 10/27/2020   General Symptoms: Yes  Skin Symptoms: No  HENT Symptoms: Yes  EYE SYMPTOMS: Yes  HEART SYMPTOMS: Yes  LUNG SYMPTOMS: Yes  INTESTINAL SYMPTOMS: No  URINARY SYMPTOMS: Yes  GYNECOLOGIC SYMPTOMS: No  BREAST SYMPTOMS: No  SKELETAL SYMPTOMS: Yes  BLOOD SYMPTOMS: No  NERVOUS SYSTEM SYMPTOMS: No  MENTAL HEALTH SYMPTOMS: No  Fever: No  Loss of appetite: No  Weight loss: No  Weight gain: No  Fatigue: No  Night sweats: No  Chills: No  Increased stress: No  Excessive hunger: No  Excessive thirst: No  Feeling hot or cold when others believe the temperature is normal: No  Loss of height: No  Post-operative complications: No  Surgical site pain: Yes  Hallucinations:  No  Change in or Loss of Energy: No  Hyperactivity: No  Confusion: No  Ear pain: No  Ear discharge: No  Hearing loss: Yes  Tinnitus: No  Nosebleeds: No  Congestion: No  Sinus pain: No   Voice hoarseness: No  Mouth sores: No  Sore throat: No  Tooth pain: No  Gum tenderness: No  Bleeding gums: No  Change in taste: No  Change in sense of smell: No  Dry mouth: No  Hearing aid used: Yes  Neck lump: No  Eye pain: No  Vision loss: No  Dry eyes: Yes  Watery eyes: No  Eye bulging: No  Double vision: No  Flashing of lights: No  Spots: No  Floaters: No  Redness: No  Crossed eyes: No  Tunnel Vision: No  Yellowing of eyes: No  Eye irritation: No  Cough: No  Sputum or phlegm: No  Coughing up blood: No  Difficulty breating or shortness of breath: No  Snoring: No  Wheezing: No  Difficulty breathing on exertion: No  Nighttime Cough: No  Difficulty breathing when lying flat: No  Chest pain or pressure: No  Fast or irregular heartbeat: No  Pain in legs with walking: Yes  Trouble breathing while lying down: No  Fingers or toes appear blue: No  High blood pressure: Yes  Low blood pressure: No  Murmurs: No  Pacemaker: No  Edema or swelling: Yes  Wake up at night with shortness of breath: No  Light-headedness: No  Exercise intolerance: No  Trouble holding urine or incontinence: Yes  Pain or burning: No  Trouble starting or stopping: No  Increased frequency of urination: Yes  Blood in urine: No  Decreased frequency of urination: No  Frequent nighttime urination: Yes  Flank pain: No  Difficulty emptying bladder: No  Back pain: Yes  Muscle aches: No  Neck pain: No  Swollen joints: Yes  Joint pain: Yes  Bone pain: No  Muscle cramps: No  Muscle weakness: No  Joint stiffness: Yes  Bone fracture: No

## 2020-11-04 ENCOUNTER — OFFICE VISIT (OUTPATIENT)
Dept: FAMILY MEDICINE | Facility: CLINIC | Age: 85
End: 2020-11-04
Payer: MEDICARE

## 2020-11-04 VITALS
OXYGEN SATURATION: 97 % | TEMPERATURE: 99.2 F | WEIGHT: 164.2 LBS | RESPIRATION RATE: 16 BRPM | SYSTOLIC BLOOD PRESSURE: 130 MMHG | HEIGHT: 57 IN | HEART RATE: 58 BPM | DIASTOLIC BLOOD PRESSURE: 78 MMHG | BODY MASS INDEX: 35.42 KG/M2

## 2020-11-04 DIAGNOSIS — F41.9 ANXIETY: ICD-10-CM

## 2020-11-04 DIAGNOSIS — E78.5 HYPERLIPIDEMIA LDL GOAL <130: ICD-10-CM

## 2020-11-04 DIAGNOSIS — G25.81 RESTLESS LEGS SYNDROME: ICD-10-CM

## 2020-11-04 DIAGNOSIS — Z00.00 ENCOUNTER FOR MEDICARE ANNUAL WELLNESS EXAM: Primary | ICD-10-CM

## 2020-11-04 PROCEDURE — 99213 OFFICE O/P EST LOW 20 MIN: CPT | Mod: 25 | Performed by: FAMILY MEDICINE

## 2020-11-04 PROCEDURE — G0439 PPPS, SUBSEQ VISIT: HCPCS | Performed by: FAMILY MEDICINE

## 2020-11-04 RX ORDER — LOVASTATIN 40 MG
40 TABLET ORAL AT BEDTIME
Qty: 90 TABLET | Refills: 3 | Status: SHIPPED | OUTPATIENT
Start: 2020-11-04 | End: 2021-10-14

## 2020-11-04 RX ORDER — ROPINIROLE 0.25 MG/1
0.25 TABLET, FILM COATED ORAL 2 TIMES DAILY
Qty: 270 TABLET | Refills: 1 | Status: SHIPPED | OUTPATIENT
Start: 2020-11-04 | End: 2020-11-05

## 2020-11-04 ASSESSMENT — MIFFLIN-ST. JEOR: SCORE: 1057.65

## 2020-11-04 NOTE — PROGRESS NOTES
"  SUBJECTIVE:   Dimple Oviedo is a 87 year old female who presents for Preventive Visit.      Patient has been advised of split billing requirements and indicates understanding: Yes  Are you in the first 12 months of your Medicare Part B coverage?  No    Physical Health:    In general, how would you rate your overall physical health? fair    Outside of work, how many days during the week do you exercise? 6-7 days/week    Outside of work, approximately how many minutes a day do you exercise?30-45 minutes    If you drink alcohol do you typically have >3 drinks per day or >7 drinks per week? No    Do you usually eat at least 4 servings of fruit and vegetables a day, include whole grains & fiber and avoid regularly eating high fat or \"junk\" foods? Yes    Do you have any problems taking medications regularly?  No    Do you have any side effects from medications? none    Needs assistance for the following daily activities: no assistance needed    Which of the following safety concerns are present in your home?  none identified     Hearing impairment: Yes, has hearing aids     In the past 6 months, have you been bothered by leaking of urine? yes    Mental Health:    In general, how would you rate your overall mental or emotional health? excellent  PHQ-2 Score:  0    Do you feel safe in your environment? Yes    Have you ever done Advance Care Planning? (For example, a Health Directive, POLST, or a discussion with a medical provider or your loved ones about your wishes): Yes, advance care planning is on file.    Additional concerns to address?  YES-question about vitamins and medications     Fall risk:  Fallen 2 or more times in the past year?: No  Any fall with injury in the past year?: No    Cognitive Screenin) Repeat 3 items (Leader, Season, Table)    2) Clock draw: NORMAL  3) 3 item recall: Recalls 3 objects  Results: 3 items recalled: COGNITIVE IMPAIRMENT LESS LIKELY    Mini-CogTM Copyright S [employer]. Licensed by " the author for use in [facility]; reprinted with permission ([email or web address]). All rights reserved.      Do you have sleep apnea, excessive snoring or daytime drowsiness?: no    Reviewed and updated as needed this visit by clinical staff  Tobacco  Allergies  Meds   Med Hx  Surg Hx  Fam Hx  Soc Hx      Reviewed and updated as needed this visit by Provider    Social History     Tobacco Use     Smoking status: Never Smoker     Smokeless tobacco: Never Used   Substance Use Topics     Alcohol use: No     Alcohol/week: 0.0 standard drinks     Comment: rare          Current providers sharing in care for this patient include:   Patient Care Team:  Pop Zapien MD as PCP - General (Family Practice)  Vincenzo Tovar MD as MD (Family Medicine - Sports Medicine)  Candelaria Raymundo MD as MD (Gastroenterology)  Shavon Bustamante PA-C as Physician Assistant (Physician Assistant)  Kenna La MD as MD (Urology)  Cristiana Correa, RN as Registered Nurse (Urology)  Pop Zapien MD as Referring Physician (Family Practice)  Kiera Figueroa, ATUL as Registered Nurse (Urology)  Kiera Figueroa, RN as Registered Nurse (Urology)  Geovanna Fregoso PA as Physician Assistant (Urology)  Jaden Toledo MD as MD (Internal Medicine-Hematology & Oncology)  Brenda Tijerina, ATUL as Specialty Care Coordinator (Hematology & Oncology)  Kenna La MD as MD (Urology)  Jaden Toledo MD as Referring Physician (Internal Medicine-Hematology & Oncology)  Mily Moser, RN as Specialty Care Coordinator (Clinical Cardiac Electrophysiology)  Butch Griffith MD as MD (Clinical Cardiac Electrophysiology)  Michaela Guerrero PA-C as Assigned PCP  Edward Otero MD as Assigned Musculoskeletal Provider  Stuart King MD as Assigned Surgical Provider  Butch Griffith MD as Assigned Heart and Vascular Provider  Jaden Toledo MD as Assigned Cancer Care  "Provider    The following health maintenance items are reviewed in Epic and correct as of today:  Health Maintenance   Topic Date Due     HF ACTION PLAN  06/23/1933     LIPID  10/04/2020     FALL RISK ASSESSMENT  10/04/2020     ALT  09/03/2021     BMP  09/03/2021     CBC  10/28/2021     MEDICARE ANNUAL WELLNESS VISIT  11/04/2021     ADVANCE CARE PLANNING  11/04/2025     DTAP/TDAP/TD IMMUNIZATION (4 - Td) 09/04/2029     TSH W/FREE T4 REFLEX  Completed     PHQ-2  Completed     INFLUENZA VACCINE  Completed     Pneumococcal Vaccine: Pediatrics (0 to 5 Years) and At-Risk Patients (6 to 64 Years)  Completed     Pneumococcal Vaccine: 65+ Years  Completed     ZOSTER IMMUNIZATION  Completed     IPV IMMUNIZATION  Aged Out     MENINGITIS IMMUNIZATION  Aged Out     HEPATITIS B IMMUNIZATION  Aged Out     Going to have knee replacement right knee next week. Already had preop and will have covid this Friday.    Using water pill daily.     Trazodone taking only 1/2 pill.    sertaline taking it daily. Wondering if she can stop it.           ROS:  Constitutional, HEENT, cardiovascular, pulmonary, gi and gu systems are negative, except as otherwise noted.    OBJECTIVE:   /78 (BP Location: Left arm, Patient Position: Sitting, Cuff Size: Adult Regular)   Pulse 58   Temp 99.2  F (37.3  C) (Oral)   Resp 16   Ht 1.454 m (4' 9.25\")   Wt 74.5 kg (164 lb 3.2 oz)   SpO2 97%   BMI 35.22 kg/m   Estimated body mass index is 35.22 kg/m  as calculated from the following:    Height as of this encounter: 1.454 m (4' 9.25\").    Weight as of this encounter: 74.5 kg (164 lb 3.2 oz).  EXAM:   GENERAL: healthy, alert and no distress  EYES: Eyes grossly normal to inspection, PERRL and conjunctivae and sclerae normal  HENT: ear canals and TM's normal,    NECK: no adenopathy, no asymmetry, masses, or scars and thyroid normal to palpation  RESP: lungs clear to auscultation - no rales, rhonchi or wheezes  CV: mostly regular rhythm, few irregular " "beats,    ABDOMEN: soft, obese, nontender, no hepatosplenomegaly, no masses and bowel sounds normal  MS: bilateral leg fullness, antalgic gait. Needs help to get up on exam table.   SKIN: no suspicious lesions or rashes on visible parts   NEURO: Normal strength and tone, mentation intact and speech normal  PSYCH: mentation appears normal, affect normal/bright       ASSESSMENT / PLAN:   (Z00.00) Encounter for Medicare annual wellness exam  (primary encounter diagnosis)  Comment:    Plan:      (E78.5) Hyperlipidemia LDL goal <130  Comment:    Plan: lovastatin (MEVACOR) 40 MG tablet             (G25.81) Restless legs syndrome  Comment:    Plan: rOPINIRole (REQUIP) 0.25 MG tablet             (F41.9) Anxiety  Comment:  Wondering about tapering off.  Plan: will try after surgery. Try 50mg for a month or so and if feeling fine - follow up with me and we can consider stopping it slowly. She wishes to continue trazodone daily. Monotherapy would be ideal but right now not urgent as it is working well for her and she is going for a major surgery.        COUNSELING:  Reviewed preventive health counseling, as reflected in patient instructions  Special attention given to:       Regular exercise       Healthy diet/nutrition       Vision screening       Hearing screening       Dental care       Bladder control    Estimated body mass index is 35.22 kg/m  as calculated from the following:    Height as of this encounter: 1.454 m (4' 9.25\").    Weight as of this encounter: 74.5 kg (164 lb 3.2 oz).    Weight management plan: Discussed healthy diet and exercise guidelines    She reports that she has never smoked. She has never used smokeless tobacco.    Appropriate preventive services were discussed with this patient, including applicable screening as appropriate for cardiovascular disease, diabetes, osteopenia/osteoporosis, and glaucoma.  As appropriate for age/gender, discussed screening for colorectal cancer, prostate cancer, breast " cancer, and cervical cancer. Checklist reviewing preventive services available has been given to the patient.    Reviewed patients plan of care and provided an AVS. The Basic Care Plan (routine screening as documented in Health Maintenance) for Dimple meets the Care Plan requirement. This Care Plan has been established and reviewed with the Patient.    Counseling Resources:  ATP IV Guidelines  Pooled Cohorts Equation Calculator  Breast Cancer Risk Calculator  BRCA-Related Cancer Risk Assessment: FHS-7 Tool  FRAX Risk Assessment  ICSI Preventive Guidelines  Dietary Guidelines for Americans, 2010  USDA's MyPlate  ASA Prophylaxis  Lung CA Screening    Pop Zapien MD, MD  [facility]

## 2020-11-05 ENCOUNTER — TELEPHONE (OUTPATIENT)
Dept: FAMILY MEDICINE | Facility: CLINIC | Age: 85
End: 2020-11-05

## 2020-11-05 DIAGNOSIS — G25.81 RESTLESS LEGS SYNDROME: ICD-10-CM

## 2020-11-05 RX ORDER — ROPINIROLE 0.25 MG/1
TABLET, FILM COATED ORAL
Qty: 270 TABLET | Refills: 1 | Status: ON HOLD | OUTPATIENT
Start: 2020-11-05 | End: 2020-11-10

## 2020-11-05 NOTE — TELEPHONE ENCOUNTER
Clarification:    Original rx for rOPINIRole (REQUIP) 0.25 MG tablet    Says: Sig - Route: Take 1 tablet (0.25 mg) by mouth 2 times daily TAKE 1 TABLET IN THE AFTERNOON AND TAKE 2 TABLETS EVERY NIGHT - Oral    Should Pt be taking 1 2x daily or 1 in am & 2 at bedtime? Please clarify.  Thanks

## 2020-11-06 DIAGNOSIS — Z11.59 ENCOUNTER FOR SCREENING FOR OTHER VIRAL DISEASES: ICD-10-CM

## 2020-11-06 PROCEDURE — U0003 INFECTIOUS AGENT DETECTION BY NUCLEIC ACID (DNA OR RNA); SEVERE ACUTE RESPIRATORY SYNDROME CORONAVIRUS 2 (SARS-COV-2) (CORONAVIRUS DISEASE [COVID-19]), AMPLIFIED PROBE TECHNIQUE, MAKING USE OF HIGH THROUGHPUT TECHNOLOGIES AS DESCRIBED BY CMS-2020-01-R: HCPCS | Performed by: ORTHOPAEDIC SURGERY

## 2020-11-07 LAB
SARS-COV-2 RNA SPEC QL NAA+PROBE: NOT DETECTED
SPECIMEN SOURCE: NORMAL

## 2020-11-08 ENCOUNTER — ANESTHESIA EVENT (OUTPATIENT)
Dept: SURGERY | Facility: CLINIC | Age: 85
End: 2020-11-08
Payer: MEDICARE

## 2020-11-09 ENCOUNTER — ANESTHESIA (OUTPATIENT)
Dept: SURGERY | Facility: CLINIC | Age: 85
End: 2020-11-09
Payer: MEDICARE

## 2020-11-09 ENCOUNTER — APPOINTMENT (OUTPATIENT)
Dept: GENERAL RADIOLOGY | Facility: CLINIC | Age: 85
End: 2020-11-09
Attending: PHYSICIAN ASSISTANT
Payer: MEDICARE

## 2020-11-09 ENCOUNTER — HOSPITAL ENCOUNTER (OUTPATIENT)
Facility: CLINIC | Age: 85
Discharge: HOME | End: 2020-11-11
Attending: ORTHOPAEDIC SURGERY | Admitting: ORTHOPAEDIC SURGERY
Payer: MEDICARE

## 2020-11-09 DIAGNOSIS — M17.11 PRIMARY OSTEOARTHRITIS OF RIGHT KNEE: ICD-10-CM

## 2020-11-09 LAB
ABO + RH BLD: NORMAL
ABO + RH BLD: NORMAL
BLD GP AB SCN SERPL QL: NORMAL
BLOOD BANK CMNT PATIENT-IMP: NORMAL
CREAT SERPL-MCNC: 0.94 MG/DL (ref 0.52–1.04)
GFR SERPL CREATININE-BSD FRML MDRD: 54 ML/MIN/{1.73_M2}
GLUCOSE SERPL-MCNC: 92 MG/DL (ref 70–99)
POTASSIUM SERPL-SCNC: 4.1 MMOL/L (ref 3.4–5.3)
SPECIMEN EXP DATE BLD: NORMAL

## 2020-11-09 PROCEDURE — 250N000011 HC RX IP 250 OP 636: Performed by: ANESTHESIOLOGY

## 2020-11-09 PROCEDURE — C1776 JOINT DEVICE (IMPLANTABLE): HCPCS | Performed by: ORTHOPAEDIC SURGERY

## 2020-11-09 PROCEDURE — 250N000011 HC RX IP 250 OP 636: Performed by: PHYSICIAN ASSISTANT

## 2020-11-09 PROCEDURE — 999N000140 HC STATISTIC PRE-PROCEDURE ASSESSMENT III: Performed by: ORTHOPAEDIC SURGERY

## 2020-11-09 PROCEDURE — 258N000003 HC RX IP 258 OP 636: Performed by: NURSE ANESTHETIST, CERTIFIED REGISTERED

## 2020-11-09 PROCEDURE — 250N000011 HC RX IP 250 OP 636: Performed by: STUDENT IN AN ORGANIZED HEALTH CARE EDUCATION/TRAINING PROGRAM

## 2020-11-09 PROCEDURE — 250N000011 HC RX IP 250 OP 636: Performed by: NURSE ANESTHETIST, CERTIFIED REGISTERED

## 2020-11-09 PROCEDURE — 370N000002 HC ANESTHESIA TECHNICAL FEE, EACH ADDTL 15 MIN: Performed by: ORTHOPAEDIC SURGERY

## 2020-11-09 PROCEDURE — 99207 PR CONSULT E&M CHANGED TO SUBSEQUENT LEVEL: CPT | Performed by: INTERNAL MEDICINE

## 2020-11-09 PROCEDURE — 250N000009 HC RX 250: Performed by: ORTHOPAEDIC SURGERY

## 2020-11-09 PROCEDURE — 360N000028 HC SURGERY LEVEL 4 1ST 30 MIN - UMMC: Performed by: ORTHOPAEDIC SURGERY

## 2020-11-09 PROCEDURE — C1713 ANCHOR/SCREW BN/BN,TIS/BN: HCPCS | Performed by: ORTHOPAEDIC SURGERY

## 2020-11-09 PROCEDURE — 86850 RBC ANTIBODY SCREEN: CPT | Performed by: ANESTHESIOLOGY

## 2020-11-09 PROCEDURE — 258N000003 HC RX IP 258 OP 636

## 2020-11-09 PROCEDURE — 73560 X-RAY EXAM OF KNEE 1 OR 2: CPT | Mod: 26 | Performed by: RADIOLOGY

## 2020-11-09 PROCEDURE — 250N000013 HC RX MED GY IP 250 OP 250 PS 637: Performed by: INTERNAL MEDICINE

## 2020-11-09 PROCEDURE — 258N000003 HC RX IP 258 OP 636: Performed by: PHYSICIAN ASSISTANT

## 2020-11-09 PROCEDURE — 370N000001 HC ANESTHESIA TECHNICAL FEE, 1ST 30 MIN: Performed by: ORTHOPAEDIC SURGERY

## 2020-11-09 PROCEDURE — 250N000013 HC RX MED GY IP 250 OP 250 PS 637: Performed by: PHYSICIAN ASSISTANT

## 2020-11-09 PROCEDURE — 86900 BLOOD TYPING SEROLOGIC ABO: CPT | Performed by: ANESTHESIOLOGY

## 2020-11-09 PROCEDURE — 36415 COLL VENOUS BLD VENIPUNCTURE: CPT | Performed by: ANESTHESIOLOGY

## 2020-11-09 PROCEDURE — 360N000029 HC SURGERY LEVEL 4 EA 15 ADDTL MIN - UMMC: Performed by: ORTHOPAEDIC SURGERY

## 2020-11-09 PROCEDURE — 82947 ASSAY GLUCOSE BLOOD QUANT: CPT | Performed by: ANESTHESIOLOGY

## 2020-11-09 PROCEDURE — 99225 PR SUBSEQUENT OBSERVATION CARE,LEVEL II: CPT | Performed by: INTERNAL MEDICINE

## 2020-11-09 PROCEDURE — 258N000001 HC RX 258: Performed by: ORTHOPAEDIC SURGERY

## 2020-11-09 PROCEDURE — 250N000011 HC RX IP 250 OP 636: Performed by: ORTHOPAEDIC SURGERY

## 2020-11-09 PROCEDURE — 999N000065 XR KNEE PORT RT 1/2 VW: Mod: RT

## 2020-11-09 PROCEDURE — 761N000003 HC RECOVERY PHASE 1 LEVEL 2 FIRST HR: Performed by: ORTHOPAEDIC SURGERY

## 2020-11-09 PROCEDURE — 278N000051 HC OR IMPLANT GENERAL: Performed by: ORTHOPAEDIC SURGERY

## 2020-11-09 PROCEDURE — 761N000004 HC RECOVERY PHASE 1 LEVEL 2 EA ADDTL HR: Performed by: ORTHOPAEDIC SURGERY

## 2020-11-09 PROCEDURE — 272N000001 HC OR GENERAL SUPPLY STERILE: Performed by: ORTHOPAEDIC SURGERY

## 2020-11-09 PROCEDURE — 250N000013 HC RX MED GY IP 250 OP 250 PS 637: Mod: GY | Performed by: PHYSICIAN ASSISTANT

## 2020-11-09 PROCEDURE — 84132 ASSAY OF SERUM POTASSIUM: CPT | Performed by: ANESTHESIOLOGY

## 2020-11-09 PROCEDURE — 258N000003 HC RX IP 258 OP 636: Performed by: ORTHOPAEDIC SURGERY

## 2020-11-09 PROCEDURE — 86901 BLOOD TYPING SEROLOGIC RH(D): CPT | Performed by: ANESTHESIOLOGY

## 2020-11-09 PROCEDURE — 82565 ASSAY OF CREATININE: CPT | Performed by: ANESTHESIOLOGY

## 2020-11-09 DEVICE — IMPLANTABLE DEVICE
Type: IMPLANTABLE DEVICE | Site: KNEE | Status: FUNCTIONAL
Brand: PERSONA® NATURAL TIBIA®

## 2020-11-09 DEVICE — BONE CEMENT SIMPLEX FULL DOSE 6191-1-001: Type: IMPLANTABLE DEVICE | Site: KNEE | Status: FUNCTIONAL

## 2020-11-09 DEVICE — IMPLANTABLE DEVICE
Type: IMPLANTABLE DEVICE | Site: KNEE | Status: FUNCTIONAL
Brand: PERSONA™

## 2020-11-09 RX ORDER — CELECOXIB 200 MG/1
400 CAPSULE ORAL ONCE
Status: COMPLETED | OUTPATIENT
Start: 2020-11-09 | End: 2020-11-09

## 2020-11-09 RX ORDER — SODIUM CHLORIDE, SODIUM LACTATE, POTASSIUM CHLORIDE, CALCIUM CHLORIDE 600; 310; 30; 20 MG/100ML; MG/100ML; MG/100ML; MG/100ML
INJECTION, SOLUTION INTRAVENOUS CONTINUOUS PRN
Status: DISCONTINUED | OUTPATIENT
Start: 2020-11-09 | End: 2020-11-09

## 2020-11-09 RX ORDER — ACETAMINOPHEN 325 MG/1
650 TABLET ORAL EVERY 4 HOURS PRN
Status: DISCONTINUED | OUTPATIENT
Start: 2020-11-12 | End: 2020-11-11 | Stop reason: HOSPADM

## 2020-11-09 RX ORDER — CEFAZOLIN SODIUM 2 G/100ML
2 INJECTION, SOLUTION INTRAVENOUS
Status: COMPLETED | OUTPATIENT
Start: 2020-11-09 | End: 2020-11-09

## 2020-11-09 RX ORDER — LABETALOL HYDROCHLORIDE 5 MG/ML
10 INJECTION, SOLUTION INTRAVENOUS
Status: DISCONTINUED | OUTPATIENT
Start: 2020-11-09 | End: 2020-11-09 | Stop reason: HOSPADM

## 2020-11-09 RX ORDER — PROCHLORPERAZINE MALEATE 5 MG
5 TABLET ORAL EVERY 6 HOURS PRN
Status: DISCONTINUED | OUTPATIENT
Start: 2020-11-09 | End: 2020-11-11 | Stop reason: HOSPADM

## 2020-11-09 RX ORDER — HYDRALAZINE HYDROCHLORIDE 20 MG/ML
2.5-5 INJECTION INTRAMUSCULAR; INTRAVENOUS EVERY 10 MIN PRN
Status: DISCONTINUED | OUTPATIENT
Start: 2020-11-09 | End: 2020-11-09 | Stop reason: HOSPADM

## 2020-11-09 RX ORDER — FLUMAZENIL 0.1 MG/ML
0.2 INJECTION, SOLUTION INTRAVENOUS
Status: DISCONTINUED | OUTPATIENT
Start: 2020-11-09 | End: 2020-11-09 | Stop reason: HOSPADM

## 2020-11-09 RX ORDER — SODIUM CHLORIDE, SODIUM LACTATE, POTASSIUM CHLORIDE, CALCIUM CHLORIDE 600; 310; 30; 20 MG/100ML; MG/100ML; MG/100ML; MG/100ML
INJECTION, SOLUTION INTRAVENOUS CONTINUOUS
Status: DISCONTINUED | OUTPATIENT
Start: 2020-11-09 | End: 2020-11-09 | Stop reason: HOSPADM

## 2020-11-09 RX ORDER — ATORVASTATIN CALCIUM 10 MG/1
10 TABLET, FILM COATED ORAL AT BEDTIME
Status: DISCONTINUED | OUTPATIENT
Start: 2020-11-09 | End: 2020-11-11 | Stop reason: HOSPADM

## 2020-11-09 RX ORDER — OXYCODONE HYDROCHLORIDE 5 MG/1
5 TABLET ORAL EVERY 4 HOURS PRN
Status: DISCONTINUED | OUTPATIENT
Start: 2020-11-09 | End: 2020-11-09

## 2020-11-09 RX ORDER — AMOXICILLIN 250 MG
1 CAPSULE ORAL 2 TIMES DAILY
Status: DISCONTINUED | OUTPATIENT
Start: 2020-11-09 | End: 2020-11-11 | Stop reason: HOSPADM

## 2020-11-09 RX ORDER — HYDROMORPHONE HCL IN WATER/PF 6 MG/30 ML
0.2 PATIENT CONTROLLED ANALGESIA SYRINGE INTRAVENOUS
Status: DISCONTINUED | OUTPATIENT
Start: 2020-11-09 | End: 2020-11-11 | Stop reason: HOSPADM

## 2020-11-09 RX ORDER — OXYCODONE HYDROCHLORIDE 5 MG/1
5 TABLET ORAL EVERY 4 HOURS PRN
Status: DISCONTINUED | OUTPATIENT
Start: 2020-11-09 | End: 2020-11-11 | Stop reason: HOSPADM

## 2020-11-09 RX ORDER — PROPOFOL 10 MG/ML
INJECTION, EMULSION INTRAVENOUS PRN
Status: DISCONTINUED | OUTPATIENT
Start: 2020-11-09 | End: 2020-11-09

## 2020-11-09 RX ORDER — HYDROMORPHONE HYDROCHLORIDE 1 MG/ML
.3-.5 INJECTION, SOLUTION INTRAMUSCULAR; INTRAVENOUS; SUBCUTANEOUS EVERY 5 MIN PRN
Status: DISCONTINUED | OUTPATIENT
Start: 2020-11-09 | End: 2020-11-09 | Stop reason: HOSPADM

## 2020-11-09 RX ORDER — HYDROMORPHONE HCL IN WATER/PF 6 MG/30 ML
0.4 PATIENT CONTROLLED ANALGESIA SYRINGE INTRAVENOUS
Status: DISCONTINUED | OUTPATIENT
Start: 2020-11-09 | End: 2020-11-11 | Stop reason: HOSPADM

## 2020-11-09 RX ORDER — CEFAZOLIN SODIUM 1 G/3ML
1 INJECTION, POWDER, FOR SOLUTION INTRAMUSCULAR; INTRAVENOUS SEE ADMIN INSTRUCTIONS
Status: DISCONTINUED | OUTPATIENT
Start: 2020-11-09 | End: 2020-11-09 | Stop reason: HOSPADM

## 2020-11-09 RX ORDER — TRANEXAMIC ACID 650 MG/1
1950 TABLET ORAL ONCE
Status: COMPLETED | OUTPATIENT
Start: 2020-11-09 | End: 2020-11-09

## 2020-11-09 RX ORDER — BISACODYL 10 MG
10 SUPPOSITORY, RECTAL RECTAL DAILY PRN
Status: DISCONTINUED | OUTPATIENT
Start: 2020-11-09 | End: 2020-11-11 | Stop reason: HOSPADM

## 2020-11-09 RX ORDER — POLYETHYLENE GLYCOL 3350 17 G/17G
17 POWDER, FOR SOLUTION ORAL DAILY
Status: DISCONTINUED | OUTPATIENT
Start: 2020-11-10 | End: 2020-11-11 | Stop reason: HOSPADM

## 2020-11-09 RX ORDER — BUPIVACAINE HYDROCHLORIDE 7.5 MG/ML
INJECTION, SOLUTION INTRASPINAL PRN
Status: DISCONTINUED | OUTPATIENT
Start: 2020-11-09 | End: 2020-11-09

## 2020-11-09 RX ORDER — ACETAMINOPHEN 325 MG/1
975 TABLET ORAL ONCE
Status: DISCONTINUED | OUTPATIENT
Start: 2020-11-09 | End: 2020-11-09 | Stop reason: HOSPADM

## 2020-11-09 RX ORDER — ROPINIROLE 0.25 MG/1
0.25 TABLET, FILM COATED ORAL EVERY EVENING
Status: DISCONTINUED | OUTPATIENT
Start: 2020-11-09 | End: 2020-11-11 | Stop reason: HOSPADM

## 2020-11-09 RX ORDER — CEFAZOLIN SODIUM 1 G/3ML
1 INJECTION, POWDER, FOR SOLUTION INTRAMUSCULAR; INTRAVENOUS EVERY 8 HOURS
Status: COMPLETED | OUTPATIENT
Start: 2020-11-09 | End: 2020-11-10

## 2020-11-09 RX ORDER — ACETAMINOPHEN 325 MG/1
975 TABLET ORAL EVERY 8 HOURS
Status: DISCONTINUED | OUTPATIENT
Start: 2020-11-09 | End: 2020-11-11 | Stop reason: HOSPADM

## 2020-11-09 RX ORDER — CYCLOSPORINE 0.5 MG/ML
1 EMULSION OPHTHALMIC 2 TIMES DAILY
Status: DISCONTINUED | OUTPATIENT
Start: 2020-11-09 | End: 2020-11-11 | Stop reason: HOSPADM

## 2020-11-09 RX ORDER — NALOXONE HYDROCHLORIDE 0.4 MG/ML
.1-.4 INJECTION, SOLUTION INTRAMUSCULAR; INTRAVENOUS; SUBCUTANEOUS
Status: ACTIVE | OUTPATIENT
Start: 2020-11-09 | End: 2020-11-10

## 2020-11-09 RX ORDER — METHIMAZOLE 5 MG/1
5 TABLET ORAL EVERY MORNING
Status: DISCONTINUED | OUTPATIENT
Start: 2020-11-10 | End: 2020-11-11 | Stop reason: HOSPADM

## 2020-11-09 RX ORDER — FENTANYL CITRATE 50 UG/ML
25-50 INJECTION, SOLUTION INTRAMUSCULAR; INTRAVENOUS
Status: DISCONTINUED | OUTPATIENT
Start: 2020-11-09 | End: 2020-11-09 | Stop reason: HOSPADM

## 2020-11-09 RX ORDER — ONDANSETRON 2 MG/ML
4 INJECTION INTRAMUSCULAR; INTRAVENOUS EVERY 6 HOURS PRN
Status: DISCONTINUED | OUTPATIENT
Start: 2020-11-09 | End: 2020-11-11 | Stop reason: HOSPADM

## 2020-11-09 RX ORDER — OXYCODONE HYDROCHLORIDE 10 MG/1
10 TABLET ORAL EVERY 4 HOURS PRN
Status: DISCONTINUED | OUTPATIENT
Start: 2020-11-09 | End: 2020-11-11 | Stop reason: HOSPADM

## 2020-11-09 RX ORDER — ROPINIROLE 0.25 MG/1
0.5 TABLET, FILM COATED ORAL AT BEDTIME
Status: DISCONTINUED | OUTPATIENT
Start: 2020-11-09 | End: 2020-11-11 | Stop reason: HOSPADM

## 2020-11-09 RX ORDER — PROPRANOLOL HCL 60 MG
60 CAPSULE, EXTENDED RELEASE 24HR ORAL EVERY MORNING
Status: DISCONTINUED | OUTPATIENT
Start: 2020-11-10 | End: 2020-11-11 | Stop reason: HOSPADM

## 2020-11-09 RX ORDER — HYDROXYZINE HYDROCHLORIDE 10 MG/1
10 TABLET, FILM COATED ORAL EVERY 6 HOURS PRN
Status: DISCONTINUED | OUTPATIENT
Start: 2020-11-09 | End: 2020-11-11 | Stop reason: HOSPADM

## 2020-11-09 RX ORDER — ONDANSETRON 2 MG/ML
4 INJECTION INTRAMUSCULAR; INTRAVENOUS EVERY 30 MIN PRN
Status: DISCONTINUED | OUTPATIENT
Start: 2020-11-09 | End: 2020-11-09 | Stop reason: HOSPADM

## 2020-11-09 RX ORDER — LIDOCAINE 40 MG/G
CREAM TOPICAL
Status: DISCONTINUED | OUTPATIENT
Start: 2020-11-09 | End: 2020-11-11 | Stop reason: HOSPADM

## 2020-11-09 RX ORDER — ONDANSETRON 4 MG/1
4 TABLET, ORALLY DISINTEGRATING ORAL EVERY 30 MIN PRN
Status: DISCONTINUED | OUTPATIENT
Start: 2020-11-09 | End: 2020-11-09 | Stop reason: HOSPADM

## 2020-11-09 RX ORDER — SODIUM CHLORIDE, SODIUM LACTATE, POTASSIUM CHLORIDE, CALCIUM CHLORIDE 600; 310; 30; 20 MG/100ML; MG/100ML; MG/100ML; MG/100ML
INJECTION, SOLUTION INTRAVENOUS CONTINUOUS
Status: DISCONTINUED | OUTPATIENT
Start: 2020-11-09 | End: 2020-11-10

## 2020-11-09 RX ORDER — SODIUM CHLORIDE 9 MG/ML
INJECTION, SOLUTION INTRAVENOUS
Status: COMPLETED
Start: 2020-11-09 | End: 2020-11-09

## 2020-11-09 RX ORDER — NALOXONE HYDROCHLORIDE 0.4 MG/ML
.1-.4 INJECTION, SOLUTION INTRAMUSCULAR; INTRAVENOUS; SUBCUTANEOUS
Status: DISCONTINUED | OUTPATIENT
Start: 2020-11-09 | End: 2020-11-09 | Stop reason: HOSPADM

## 2020-11-09 RX ORDER — DOCUSATE SODIUM 100 MG/1
100 CAPSULE, LIQUID FILLED ORAL 2 TIMES DAILY
Status: DISCONTINUED | OUTPATIENT
Start: 2020-11-09 | End: 2020-11-11 | Stop reason: HOSPADM

## 2020-11-09 RX ORDER — ASPIRIN 81 MG/1
162 TABLET ORAL DAILY
Status: DISCONTINUED | OUTPATIENT
Start: 2020-11-10 | End: 2020-11-11 | Stop reason: HOSPADM

## 2020-11-09 RX ORDER — FERROUS SULFATE 325(65) MG
325 TABLET ORAL EVERY MORNING
Status: DISCONTINUED | OUTPATIENT
Start: 2020-11-10 | End: 2020-11-11 | Stop reason: HOSPADM

## 2020-11-09 RX ORDER — ONDANSETRON 4 MG/1
4 TABLET, ORALLY DISINTEGRATING ORAL EVERY 6 HOURS PRN
Status: DISCONTINUED | OUTPATIENT
Start: 2020-11-09 | End: 2020-11-11 | Stop reason: HOSPADM

## 2020-11-09 RX ORDER — PROPOFOL 10 MG/ML
INJECTION, EMULSION INTRAVENOUS CONTINUOUS PRN
Status: DISCONTINUED | OUTPATIENT
Start: 2020-11-09 | End: 2020-11-09

## 2020-11-09 RX ADMIN — ROPINIROLE HYDROCHLORIDE 0.5 MG: 0.25 TABLET, FILM COATED ORAL at 22:49

## 2020-11-09 RX ADMIN — PROPOFOL 50 MCG/KG/MIN: 10 INJECTION, EMULSION INTRAVENOUS at 13:40

## 2020-11-09 RX ADMIN — FAMOTIDINE 20 MG: 10 INJECTION, SOLUTION INTRAVENOUS at 15:02

## 2020-11-09 RX ADMIN — CEFAZOLIN 1 G: 1 INJECTION, POWDER, FOR SOLUTION INTRAMUSCULAR; INTRAVENOUS at 22:50

## 2020-11-09 RX ADMIN — ATORVASTATIN CALCIUM 10 MG: 10 TABLET, FILM COATED ORAL at 22:49

## 2020-11-09 RX ADMIN — SODIUM CHLORIDE, POTASSIUM CHLORIDE, SODIUM LACTATE AND CALCIUM CHLORIDE: 600; 310; 30; 20 INJECTION, SOLUTION INTRAVENOUS at 17:39

## 2020-11-09 RX ADMIN — DOCUSATE SODIUM AND SENNOSIDES 1 TABLET: 8.6; 5 TABLET ORAL at 20:11

## 2020-11-09 RX ADMIN — SODIUM CHLORIDE 1000 ML: 9 INJECTION, SOLUTION INTRAVENOUS at 20:14

## 2020-11-09 RX ADMIN — PHENYLEPHRINE HYDROCHLORIDE: 10 INJECTION INTRAVENOUS at 14:36

## 2020-11-09 RX ADMIN — BUPIVACAINE HYDROCHLORIDE IN DEXTROSE 0.3 ML: 7.5 INJECTION, SOLUTION SUBARACHNOID at 12:59

## 2020-11-09 RX ADMIN — Medication 2 G: at 13:45

## 2020-11-09 RX ADMIN — PHENYLEPHRINE HYDROCHLORIDE 0.2 MCG/KG/MIN: 10 INJECTION INTRAVENOUS at 13:47

## 2020-11-09 RX ADMIN — HYDRALAZINE HYDROCHLORIDE 5 MG: 20 INJECTION INTRAMUSCULAR; INTRAVENOUS at 16:16

## 2020-11-09 RX ADMIN — OXYCODONE HYDROCHLORIDE 5 MG: 5 TABLET ORAL at 18:48

## 2020-11-09 RX ADMIN — BUPIVACAINE HYDROCHLORIDE IN DEXTROSE 0.2 ML: 7.5 INJECTION, SOLUTION SUBARACHNOID at 13:37

## 2020-11-09 RX ADMIN — HYDROMORPHONE HYDROCHLORIDE 0.2 MG: 1 INJECTION, SOLUTION INTRAMUSCULAR; INTRAVENOUS; SUBCUTANEOUS at 16:15

## 2020-11-09 RX ADMIN — CELECOXIB 400 MG: 200 CAPSULE ORAL at 11:39

## 2020-11-09 RX ADMIN — FENTANYL CITRATE 50 MCG: 50 INJECTION INTRAMUSCULAR; INTRAVENOUS at 12:21

## 2020-11-09 RX ADMIN — PROPOFOL 10 MG: 10 INJECTION, EMULSION INTRAVENOUS at 13:40

## 2020-11-09 RX ADMIN — ACETAMINOPHEN 975 MG: 325 TABLET, FILM COATED ORAL at 18:48

## 2020-11-09 RX ADMIN — ONDANSETRON 4 MG: 2 INJECTION INTRAMUSCULAR; INTRAVENOUS at 17:04

## 2020-11-09 RX ADMIN — PROPOFOL 10 MG: 10 INJECTION, EMULSION INTRAVENOUS at 13:45

## 2020-11-09 RX ADMIN — SODIUM CHLORIDE, POTASSIUM CHLORIDE, SODIUM LACTATE AND CALCIUM CHLORIDE: 600; 310; 30; 20 INJECTION, SOLUTION INTRAVENOUS at 13:32

## 2020-11-09 RX ADMIN — BUPIVACAINE HYDROCHLORIDE IN DEXTROSE 0.3 ML: 7.5 INJECTION, SOLUTION SUBARACHNOID at 13:05

## 2020-11-09 RX ADMIN — TRANEXAMIC ACID 1950 MG: 650 TABLET ORAL at 11:39

## 2020-11-09 RX ADMIN — CYCLOSPORINE 1 DROP: 0.5 EMULSION OPHTHALMIC at 20:03

## 2020-11-09 ASSESSMENT — ENCOUNTER SYMPTOMS
DYSRHYTHMIAS: 1
SEIZURES: 0
ORTHOPNEA: 0

## 2020-11-09 ASSESSMENT — MIFFLIN-ST. JEOR: SCORE: 1076.88

## 2020-11-09 ASSESSMENT — LIFESTYLE VARIABLES: TOBACCO_USE: 0

## 2020-11-09 NOTE — ANESTHESIA CARE TRANSFER NOTE
Patient: Dimple Oviedo    Procedure(s):  ARTHROPLASTY, KNEE, TOTAL, RIGHT    Diagnosis: Primary osteoarthritis of right knee [M17.11]  Diagnosis Additional Information: No value filed.    Anesthesia Type:   MAC, Epidural     Note:  Airway :Face Mask  Patient transferred to:PACU  Comments: 187/79, HR 50, sat 100%, RR 16, T 36.4Handoff Report: Identifed the Patient, Identified the Reponsible Provider, Reviewed the pertinent medical history, Discussed the surgical course, Reviewed Intra-OP anesthesia mangement and issues during anesthesia, Set expectations for post-procedure period and Allowed opportunity for questions and acknowledgement of understanding      Vitals: (Last set prior to Anesthesia Care Transfer)    CRNA VITALS  11/9/2020 1501 - 11/9/2020 1543      11/9/2020             Pulse:  53    SpO2:  100 %                Electronically Signed By: NELSON Ward CRNA  November 9, 2020  3:43 PM

## 2020-11-09 NOTE — CONSULTS
[facility]   Consult Note - Hospitalist Service     Date of Admission:  11/9/2020  Consult Requested by: Brian Andino PA-C  Reason for Consult: Post     Assessment & Plan   Dimple Oviedo is a 87 year old female admitted on 11/9/2020. She has a PMH of  hypertension, dyslipidemia, CHF, h/o a fib, RLS, GERD, anxiety, urothelial carcinoma, polymyalgia rheumatica .   She underwent Rt Total Knee arthroplasty today. Internal medicine consulted for postoperative co management  Individual problems and their management are outlined below      S/P Rt Knee Arthroplasty, POD#0:  Management by primary team. Please see their notes for further details  Stop IV fluids in 2 hrs as patient is already having dinner   Pain control with acetaminophen 975 mg every 8 hrs for 3 days  Oxycodone 5-10 mg every 3 hrs PRN and IV hydromorphone 0.2-0.4 mg q 2 hrs  PRN for breakthrough pain   DVT prophylaxis with  SCDs while in hospital, aspirin 162 mg daily for 4 weeks   Perioperative antibiotics as per primary team   Overnight Capnography monitoring  PT/OT as per protocol  Incentive spirometry and aggressive bowel regimen (This has been ordered)  We will monitor for acute blood loss anemia. Pre op Hgb at  13.0      HTN:  Resume home dose of inderal 60 mg every morning  Irbesartan 300 mg can be resumed after BMP check in the morning    CAD  Dyslipidemia:  Takes aspirin 81 mg daily, replaced by 162 mg for DVT prophylaxis  Wilver says she may have had an MI and A fib in the past. She chose not to take DOAC or warfarin.  Currently has a regular rhythm. Most recent EKG in sinus rythm  Resume home dose of lovastatin 40 mg daily    CHF, Mild aortic stenosis   Lymphedema   Most recent Echo in September 2020 with Global and regional left ventricular function is normal with an EF of 60-65%.Grade II or moderate diastolic dysfunction.  Mild aortic stenosis noted  Stop IV fluids if tolerating oral  diet  Resume Lasix 20 mg after BMP check  Bilateral lymphedema R>L     RLS   resume Requip 0.50 mg atnoon and 0.25 mg at bed time       Depression  Anxiety  Fibromyalgia  Hyperthyroidism  Resume home dose of Sertraline  Resume Methimazole 5 mg       GERD:   Resume 20 mg prilosec every morning       The patient's care was discussed with the Bedside Nurse and Patient.    Lucy Villatoro MD  [facility]     ______________________________________________________________________    Chief Complaint   S/P Rt Total Knee Arthroplasty     History is obtained from the patient, pre op physical ( 10/21) and perioperative notes     History of Present Illness   Dimple Oviedo is a 87 year old female who underwent the above procedure today.  The procedure itself was uneventful with estimated blood loss of 100 mL.  The procedure was done under epidural and MAC anesthesia.    Postoperatively patient recovered well.  I met patient up on floor 5 Ortho, where she was resting comfortably and having her dinner.  She denies any chest pain or shortness of breath.  She denies any fevers or chills.  She denies any nausea, vomiting or diarrhea.    Her past medical history has been reviewed.  Patient tells me that she has had a history of atrial fibrillation, but declined the use of direct acting oral anticoagulants.  She has an extensive  medication list, all of which were reviewed and reconciled.       mls,   Review of Systems   The 10 point Review of Systems is negative other than noted in the HPI or here.     Past Medical History    I have reviewed this patient's medical history and updated it with pertinent information if needed.   Past Medical History:   Diagnosis Date     Calculus of kidney      Chemotherapy-induced neutropenia (H) 3/6/2019     Esophageal reflux      GERD (gastroesophageal reflux disease)      Hyperlipidemia LDL goal <130 5/9/2010     Malignant melanoma of  skin of trunk, except scrotum (H)      Nonspecific abnormal finding     has living will 2004 -      Nontoxic multinodular goiter     no further eval /tx rec per pt     Osteopenia      Other psoriasis      Personal history of colonic polyps      PMR (polymyalgia rheumatica) (H)      Stress-induced cardiomyopathy      Undiagnosed cardiac murmurs      Unspecified constipation      Unspecified essential hypertension      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       Past Surgical History   I have reviewed this patient's surgical history and updated it with pertinent information if needed.  Past Surgical History:   Procedure Laterality Date     BIOPSY       COLONOSCOPY  2014     COMBINED CYSTOSCOPY, INSERT STENT URETER(S) Bilateral 9/12/2018    Procedure: COMBINED CYSTOSCOPY, INSERT STENT URETER(S);  Cystoscopy Bilateral ureteral Stent Placement.;  Surgeon: Justin Henry MD;  [facility]     COMBINED CYSTOSCOPY, RETROGRADES, URETEROSCOPY, INSERT STENT Bilateral 4/3/2018    Procedure: COMBINED CYSTOSCOPY, RETROGRADES, URETEROSCOPY, INSERT STENT;;  Surgeon: Stuart King MD;  [facility]     COMBINED CYSTOSCOPY, RETROGRADES, URETEROSCOPY, INSERT STENT Bilateral 9/10/2018    Procedure: COMBINED CYSTOSCOPY, RETROGRADES, URETEROSCOPY, INSERT STENT;  Cystoscopy, Bilateral Ureteroscopy, Bladder Biopsies, Retrogram Pyelograms, Ureteral Washings and brushings, cysview;  Surgeon: Stuart King MD;  [facility]     COMBINED CYSTOSCOPY, RETROGRADES, URETEROSCOPY, INSERT STENT Left 8/26/2020    Procedure: Cystoscopy, Left Ureteral Wash, Left ureteral Retrograde Pyelogram;  Surgeon: Justin Henry MD;  [facility]     CYSTOSCOPY, BIOPSY BLADDER INSTILL OPTICAL AGENT N/A 4/3/2018    Procedure: CYSTOSCOPY, BIOPSY BLADDER INSTILL OPTICAL AGENT;  Cystoscopy, Blue Light Cystoscopy, Bladder Biopsies, Bilateral Selective ureteral washings for Cytology, Bilateral Retrograde Pyelograms,  Bilateral Ureteroscopy;  Surgeon: Stuart King MD;  [facility]     CYSTOSCOPY, BIOPSY BLADDER, COMBINED N/A 2/19/2018    Procedure: COMBINED CYSTOSCOPY, BIOPSY BLADDER;  Cystoscopy, Bladder Biopsy;  Surgeon: Kenna La MD;  [facility]     CYSTOSCOPY, BIOPSY BLADDER, COMBINED N/A 8/26/2020    Procedure: Cystoscopy, biopsy bladder, combined;  Surgeon: Justin Henry MD;  [facility]     CYSTOSCOPY, FULGURATE BLADDER TUMOR, COMBINED N/A 1/13/2020    Procedure: CYSTOSCOPY, WITH  bladder biopsies and fulguration;  Surgeon: Stuart King MD;  [facility]     CYSTOSCOPY, REMOVE STENT(S), COMBINED  11/13/2018    Procedure: Flexible Cystoscopy with Stent Removal;  Surgeon: Stuart King MD;  [facility]     DAVINCI LYMPHADENECTOMY N/A 11/13/2018    Procedure: Davinci Lymphadenectomy ;  Surgeon: Stuart King MD;  [facility]     DAVINCI NEPHROURETERECTOMY N/A 11/13/2018    Procedure: Right DaVinci Assisted Nephroureterectomy;  Surgeon: Stuart King MD;  [facility]     ENDOSCOPIC ULTRASOUND LOWER GASTROINTESTIONAL TRACT (GI) N/A 10/30/2015    Procedure: ENDOSCOPIC ULTRASOUND LOWER GASTROINTESTIONAL TRACT (GI);  Surgeon: Daniel Jean-Baptiste MD;  [facility]     EYE SURGERY  12/4/17     INSERT PORT VASCULAR ACCESS Right 12/19/2018    Procedure: Chest Port Placement - right;  Surgeon: Stuart Chavez PA-C;  [facility]     IR CHEST PORT PLACEMENT > 5 YRS OF AGE  12/19/2018     IR PORT REMOVAL RIGHT  6/26/2019     LAPAROSCOPIC CHOLECYSTECTOMY WITH CHOLANGIOGRAMS N/A 11/1/2015    Procedure: LAPAROSCOPIC CHOLECYSTECTOMY WITH CHOLANGIOGRAMS;  Surgeon: Tonie Warren MD;  [facility]     REMOVE PORT VASCULAR ACCESS Right 6/26/2019    Procedure: Right Port Removal;  Surgeon: Froilan Irizarry PA-C;  [facility]     SURGICAL HISTORY OF -   1996    malignant melanoma     SURGICAL HISTORY  OF -   1968    thyroid nodule     SURGICAL HISTORY OF -       D & C     [facility] NONSPECIFIC PROCEDURE  2005    colonoscopy polyp repeat 2010       Social History   I have reviewed this patient's social history and updated it with pertinent information if needed.  Social History     Tobacco Use     Smoking status: Never Smoker     Smokeless tobacco: Never Used   Substance Use Topics     Alcohol use: No     Alcohol/week: 0.0 standard drinks     Comment: rare     Drug use: No       Family History   I have reviewed this patient's family history and updated it with pertinent information if needed.   Family History   Problem Relation Age of Onset     Cancer Father         dec - esophageal and laryngeal     Heart Disease Mother      Respiratory Mother         dec     Breast Cancer Daughter      Other Cancer Daughter      Thyroid Disease Daughter      Asthma Daughter      Hyperlipidemia Son      Diabetes Son      Anesthesia Reaction No family hx of      Deep Vein Thrombosis (DVT) No family hx of        Medications   I have reviewed this patient's current medications    Allergies   Allergies   Allergen Reactions     Codeine        Physical Exam   Vital Signs: Temp: 97.9  F (36.6  C) Temp src: Temporal BP: (!) 140/68 Pulse: 59   Resp: 8 SpO2: 96 % O2 Device: None (Room air) Oxygen Delivery: 6 LPM  Weight: 169 lbs 5.01 oz    Constitutional: awake, alert, cooperative, no apparent distress, and appears stated age  Eyes: Lids and lashes normal, pupils equal, round and reactive to light, extra ocular muscles intact, sclera clear, conjunctiva normal  ENT: Normocephalic, without obvious abnormality, atraumatic, sinuses nontender on palpation, external ears without lesions, oral pharynx with moist mucous membranes, tonsils without erythema or exudates, gums normal and good dentition.  Respiratory: No increased work of breathing, good air exchange, clear to auscultation bilaterally, no crackles or wheezing  Cardiovascular: regular rate  and rhythm, normal S1 and S2 and murmurs include systolic murmur III/VI located at right upper sternal border without radiation  GI: No scars, normal bowel sounds, soft, non-distended, non-tender, no masses palpated, no hepatosplenomegally  Skin: no bruising or bleeding  Musculoskeletal: Rt knee with dressings. No distal deficits. Bilateral lower extremity edema. R>L  Neurologic: Awake, alert, oriented to name, place and time.  Cranial nerves II-XII are grossly intact.   Data   Results for orders placed or performed during the hospital encounter of 11/09/20 (from the past 24 hour(s))   Glucose   Result Value Ref Range    Glucose 92 70 - 99 mg/dL   Potassium   Result Value Ref Range    Potassium 4.1 3.4 - 5.3 mmol/L   Creatinine   Result Value Ref Range    Creatinine 0.94 0.52 - 1.04 mg/dL    GFR Estimate 54 (L) >60 mL/min/[1.73_m2]    GFR Estimate If Black 63 >60 mL/min/[1.73_m2]   ABO/Rh type and screen   Result Value Ref Range    ABO O     RH(D) Pos     Antibody Screen Neg     Test Valid Only At          [facility]    Specimen Expires 11/12/2020    XR Knee Port Right 1/2 Views    Narrative    Exam: 2 views of the right knee dated 11/9/2020.    COMPARISON: 9/25/2019.    CLINICAL HISTORY: Postop evaluation.    FINDINGS: AP and lateral views of the right knee were obtained. There  are new postsurgical changes of placement of a right total knee  arthroplasty. The hardware appears intact. Postoperative air is noted.      Impression    IMPRESSION: New postsurgical changes of placement of a right total  knee arthroplasty, without complication.    MARY ANNE REED MD     *Note: Due to a large number of results and/or encounters for the requested time period, some results have not been displayed. A complete set of results can be found in Results Review.

## 2020-11-09 NOTE — ANESTHESIA PROCEDURE NOTES
Epidural Procedure Note      Staff -   Anesthesiologist:  Gemini Driver MD  Resident/Fellow: Darío Haq MD  Performed By: fellow  Procedure performed by resident/CRNA in presence of a teaching physician.      Location: Pre-op     Procedure start time:  11/9/2020 12:20 PM     Procedure end time:  11/9/2020 12:30 PM   Pre-procedure checklist:   patient identified, IV checked, site marked, risks and benefits discussed, informed consent, monitors and equipment checked, pre-op evaluation, at physician/surgeon's request and post-op pain management      Correct Patient: Yes      Correct Position: Yes      Correct Site: Yes      Correct Procedure: Yes      Correct Laterality:  Yes    Site Marked:  Yes  Procedure:     Procedure:  Intrathecal    Diagnosis:  Moderate aortic stenosis    Position:  Sitting    Sterile Prep: chloraprep      Insertion site:  L3-4    Local skin infiltration:  1% lidocaine    amount (mL):  2    Approach:  Midline    Needle gauge (G):  17    Needle Length (in):  3.5    Block Needle Type:  Touhy    Injection Technique:  LORT saline    FELIX at (cm):  9    Attempts:  1    Redirects:  1    Catheter gauge (G):  19    Catheter threaded easily: Yes      Threaded to cm at skin:  14    Threaded in epidural space (cm):  5    Paresthesias:  No    Aspiration negative for Heme or CSF: No    Assessment/Narrative:      Inadvertent intrathecal catheter (without wet tap with Touhy needle).   Patient tolerated well. No paresthesia, no heme. Classic loss of resistance to saline. Catheter threaded easily, without resistance or excess force. No CSF through Touhy needle, however CSF was aspirated through catheter prior to giving test dose. Given this, no lidocaine test dose was given. Bupivacaine 0.75% with dextrose was slowly titrated with appropriate block. (No other bupivacaine available that is labelled for spinal use - confirmed with Dr. Zepeda).  Appropriate block afterwards.    I discussed the inadvertent  intrathecal catheter placement with the patient and her visitor (daughter in law, retired nurse). I discussed that there was no CSF fluid through the Touhy needle. Most likely the epidural catheter itself punctured the dura and entered the intrathecal space, which is rare and unpredictable. We had already discussed risk of postdural puncture headache during consent for the epidural. I reiterated the signs/symptoms of postdural puncture headache for the patient to be aware of postoperatively. I also informed Dr. Otero.

## 2020-11-09 NOTE — OR NURSING
PACU to Inpatient Nursing Handoff    Patient Dimple Oviedo is a 87 year old female who speaks English.   Procedure Procedure(s):  ARTHROPLASTY, KNEE, TOTAL, RIGHT   Surgeon(s) Primary: Edward Otero MD  Assisting: Brian Andino PA-C  Resident - Assisting: Froilan Villegas MD     Allergies   Allergen Reactions     Codeine        Isolation  [unfilled]     Past Medical History   has a past medical history of Calculus of kidney, Chemotherapy-induced neutropenia (H) (3/6/2019), Esophageal reflux, GERD (gastroesophageal reflux disease), Hyperlipidemia LDL goal <130 (5/9/2010), Malignant melanoma of skin of trunk, except scrotum (H), Nonspecific abnormal finding, Nontoxic multinodular goiter, Osteopenia, Other psoriasis, Personal history of colonic polyps, PMR (polymyalgia rheumatica) (H), Stress-induced cardiomyopathy, Undiagnosed cardiac murmurs, Unspecified constipation, Unspecified essential hypertension, and Urothelial carcinoma (H) (3/22/2018).    Anesthesia Spinal   Dermatome Level Dermatomes Left: L4  Dermatomes Right: S1;L4   Preop Meds celecoxib (Celebrex) - time given: 1139   Nerve block Not applicable   Intraop Meds pepcid 1502   Local Meds Yes - Local Cocktail (morphine, ropivacaine, epinephrine, Toradol)   Antibiotics cefazolin (Ancef) - last given at 1345     Pain Patient Currently in Pain: yes  Comfort: tolerable with discomfort  Pain Control: partially effective   PACU meds  hydromorphone (Dilaudid): 0.2 mg (total dose) last given at 1614   hydralazine (Apresoline): 5 mg (total dose) last given at 1612    PCA / epidural No   Capnography Respiratory Monitoring (EtCO2): 41 mmHg  Integrated Pulmonary Index (IPI): 10   Telemetry ECG Rhythm: Sinus bradycardia   Inpatient Telemetry Monitor Ordered? No        Labs Glucose Lab Results   Component Value Date    GLC 92 11/09/2020       Hgb Lab Results   Component Value Date    HGB 13.0 10/28/2020       INR Lab Results   Component Value Date    INR  0.95 07/20/2020      PACU Imaging Completed     Wound/Incision Incision/Surgical Site 11/01/15 Bilateral Abdomen (Active)   Number of days: 1835       Incision/Surgical Site 11/13/18 Abdomen (Active)   Number of days: 727       Incision/Surgical Site 12/19/18 Right Chest (Active)   Number of days: 691       Incision/Surgical Site 06/26/19 Anterior Chest (Active)   Number of days: 502       Incision/Surgical Site 11/09/20 Right Knee (Active)   Incision Assessment UTV 11/09/20 1629   Closure Approximated;Liquid bandage 11/09/20 1504   Dressing Intervention Clean, dry, intact 11/09/20 1629   Number of days: 0      CMS        Equipment ice pack   Other LDA       IV Access Peripheral IV 11/09/20 Left Lower forearm (Active)   Site Assessment WDL 11/09/20 1534   Line Status Infusing 11/09/20 1534   Dressing Intervention New dressing  11/09/20 1145   Phlebitis Scale 0-->no symptoms 11/09/20 1145   Infiltration Scale 0 11/09/20 1145   Number of days: 0      Blood Products   mL   Intake/Output Date 11/09/20 0700 - 11/10/20 0659   Shift 1488-1276 9772-9139 8847-1044 24 Hour Total   INTAKE   P.O.  120  120   Shift Total(mL/kg)  120(1.56)  120(1.56)   OUTPUT   Blood  100  100   Shift Total(mL/kg)  100(1.3)  100(1.3)   Weight (kg) 76.8 76.8 76.8 76.8      Drains / Diehl Urethral Catheter Double-lumen;Latex;Straight-tip;Silver coated 16 fr (Active)   Number of days: 727      Time of void PreOp Void Prior to Procedure: 1115 (11/09/20 1127)    PostOp      Diapered? No   Bladder Scan      mL (11/09/20 1600)  tolerating sips     Vitals    B/P: (!) 114/90  T: 98.1  F (36.7  C)    Temp src: Temporal  P:  Pulse: 58 (11/09/20 1635)          R: 9  O2:  SpO2: 96 %    O2 Device: None (Room air) (11/09/20 1635)    Oxygen Delivery: 6 LPM (11/09/20 1534)         Family/support present family went home   Patient belongings     Patient transported on bed   DC meds/scripts (obs/outpt) Not applicable   Inpatient Pain Meds Released? Yes        Special needs/considerations None   Tasks needing completion None       Virginia Chiang, RN  ASCOM [id number]

## 2020-11-09 NOTE — BRIEF OP NOTE
[facility]     Brief Operative Note    Pre-operative diagnosis: Primary osteoarthritis of right knee [M17.11]  Post-operative diagnosis Same as pre-operative diagnosis    Procedure: Procedure(s):  ARTHROPLASTY, KNEE, TOTAL, RIGHT  Surgeon: Surgeon(s) and Role:     * Edward Otero MD - Primary     * Brian Andino PA-C - Assisting     * Froilan Villegas MD - Resident - Assisting  Anesthesia: Spinal   Estimated blood loss: 100cc  Drains: None  Specimens: * No specimens in log *  Findings:   See Op Note.  Complications: None.  Implants:   Implant Name Type Inv. Item Serial No.  Lot No. LRB No. Used Action   BONE CEMENT SIMPLEX FULL DOSE 6191-1-001 Cement, Bone BONE CEMENT SIMPLEX FULL DOSE 6191-1-001  [employer] [id number] Right 2 Implanted   Persona cemented femur, posterior stabilized, right, size 4    JOSSY [id number] Right 1 Implanted   Persona cemented tibia, 5 degree right, size C    JOSSY [id number] Right 1 Implanted   IMP PATELLA ZIM KNEE ALL YAHAIRA 32MM -606-32 Total Joint Component/Insert IMP PATELLA ZIM KNEE ALL YAHAIRA 32MM -558-32  [employer] [id number] Right 1 Implanted   Articular Surface, fixed bearing, PS, right, 14mm height    JOSSY [id number] Right 1 Implanted       Plan:  Ortho Primary  Activity: Up with assist.  Weight bearing status: WBAT.  Antibiotics: Ancef x 24 hours.  Diet: Begin with clear fluids and progress diet as tolerated.  DVT prophylaxis:  mg x 4 weeks, SCDs in the hospital.  Bracing/Splinting: None.  Wound Care: Tegaderm x 7 days.  Drains: None.  Pain management: transition from IV to orals as tolerated.   X-rays: AP knee XR in PACU.  Physical Therapy: ROM, ADL's.  Occupational Therapy: ADL's.  Labs: Trend Hgb on POD #1.  Consults: Hospitalist, PT, OT - appreciate assistance in caring for this patient.  Follow-up: Clinic with Dr. Otero's team in 2 weeks.   Disposition: Pending progress  with therapies, pain control on orals, and medical stability, anticipate discharge to home on POD #1.      MELANIE Lazo, PA-C  Physician Assistant, Orthopedic Surgery  Pager: [phone]

## 2020-11-09 NOTE — DISCHARGE SUMMARY
[facility]   Orthopaedic Discharge Summary    Patient: Dimple Oviedo MRN# [id number]   Age/Sex: 87 year old female YOB: 1933      Date of Admission:  11/9/2020  Date of Discharge:  11/11/2020 12:05 PM  Admitting Physician:  Edward Otero MD  Discharge Physician:  Froilan Villegas MD  Primary Care Provider:  Pop Zapien  Discharge location         Home    DISCHARGE DIAGNOSIS:    Primary osteoarthritis of right knee [M17.11]    PROCEDURE(S):   11/9/2020 Procedure(s):  ARTHROPLASTY, KNEE, TOTAL, RIGHT     BRIEF HISTORY:  Ms. Oviedo is an 87 year-old female with a many year history of right knee pain that has failed conservative management.  She originally was scheduled for her procedure earlier this year but was subsequently canceled due to the COVID-19 pandemic.  She has had persistent pain in her right knee and wished for more definitive management.  After discussion of the risks and benefits of surgery, she chose to undergo a total knee arthroplasty.    HOSPITAL COURSE:    Surgery was uncomplicated. Postoperatively the patient was admitted to the orthopedic surgery service.  Intravenous antibiotics were provided for 24 hours after the surgery.  Medicine was consulted to help with management of medical comorbidities. Patient received routine nursing cares on the floor.  A clear liquid diet was started and subsequently advance to regular, which the patient tolerated without issue.  Stool softeners were administered while taking pain medications to prevent constipation.  The  patient was able to void independently.  Post operative xrays were obtained. Physical and occupational therapy were initiated for safety training, ADLs, mobility, and ROM exercises.  On postoperative day #1, the patient was able to pass physical and occupational therapy for home, but did have an acute increase in her creatinine.  This improved after a normal saline  fluid bolus.  This remained stable on postoperative day #2.  After demonstrating the ability to tolerate a diet, pain control on oral pain medications, and evaluation by physical and occupational therapy, the patient was deemed medically safe for discharge to home with family.    FOLLOW UP:      Future Appointments   Date Time Provider Department Center   11/10/2020  8:45 AM Crista Barry PT URPT [place]   11/10/2020  9:30 AM Ambar Davis OT UROT [place]   11/10/2020  2:30 PM Crista Barry PT URPT [place]   11/21/2020 11:00 AM  LAB UCLAB [facility]   11/25/2020 12:00 PM  ONCOLOGY INFUSION [facility]   11/27/2020  2:00 PM Edward Kim PA-C [facility]   12/2/2020 12:00 PM  ONCOLOGY INFUSION [facility]   12/9/2020 10:20 AM [email or web address] [facility]   12/9/2020 11:45 AM  MASONIC LAB DRAW UCONL [facility]   12/9/2020 12:30 PM Jaden Toledo MD [facility]   12/10/2020 12:00 PM  ONCOLOGY INFUSION [facility]   12/16/2020 12:15 PM Edward Otero MD [facility]   1/22/2021  8:40 AM Justin Henry MD [facility] ORTHOPEDICS - Located 4th Floor (4D ) in the Clinics and Surgery Center at [address] [place] [address].     Department Address: [address] [place] [id number]     [facility] Orthopedic Scheduling contact number: [phone]    Call if you haven't heard regarding these appointments within 7 days of discharge.      PHYSICAL EXAMINATION:  Temp: 97.6  F (36.4  C) Temp src: Oral BP: (P) 131/59 Pulse: 60   Resp: 20 SpO2: 98 % O2 Device: None (Room air)       Exam:  Gen: No acute distress, resting comfortably in bed.  Resp: Non-labored breathing     MSK:      RUE: Ecchymosis and mild swelling to the right hand.  Gross sensation is intact in the median/radial/ulnar distributions, firing EPL/FPL/intrinsics.  Radial pulse palpable  Focused examination of the Right LE shows:  Inspection: Dressing C/D/I  Motor:  Tibialis anterior,  "gastroc/soleus, EHL strength 5/5              Sensory: SILT to superficial peroneal, deep peroneal, saphenous, sural, and tibial nerve territories  Circulation: palpable DP, foot warm and well perfused         PLANNED DISCHARGE ORDERS:          Discharge Procedure Orders   Home care nursing referral   Referral Priority: Routine Referral Type: Home Health Therapies & Aides   Number of Visits Requested: 1     Reason for your hospital stay   Order Comments: You stayed in the hospital overnight following your surgery     Contact Surgeon Team   Order Comments: You may experience symptoms that require follow-up before your scheduled appointment. Refer to the \"[employer]\" for instructions on when to contact Dr. Otero's office if you are concerned about pain control, blood clots, constipation, or if you are unable to urinate.    Regular business hours (Monday - Friday, 8am - 5pm):  [facility] [place]: [phone]  [facility]: [phone]    After hours and weekends:  [facility] on call Orthopedic resident: [phone]     Orthopedic Urgent Care   Order Comments: If you are not able to reach Dr. Otero's office and you need immediate care, go to the Orthopedic Urgent Care at your Surgeon's office.  Do NOT go to the Emergency Room unless you have shortness of breath, chest pain, or other signs of a medical emergency.     Call 911   Order Comments: Call 911 immediately if you experience sudden-onset chest pain, arm weakness/numbness, slurred speech, or shortness of breath     Breathing exercises   Order Comments: Perform breathing exercises using your Incentive Spirometer 10 times per hour while awake for 2 weeks.     Fever Management   Order Comments: A low grade fever can be expected after surgery.  Use acetaminophen (TYLENOL) as needed for fever management.  Contact your Surgeon Team if you have a fever greater than 101.5 F, chills, and/or night sweats. "     Constipation management   Order Comments: Constipation (hard, dry bowel movements) is expected after surgery due to the combination of being less active, the anesthetic, and the opioid pain medication.  You can do the following to help reduce constipation:  ~  FLUIDS:  Drink clear liquids (water or Gatorade), or juice (apple/prune).  ~  DIET:  Eat a fiber rich diet.    ~  ACTIVITY:  Get up and move around several times a day.  Increase your activity as you are able.  MEDICATIONS:  Reduce the risk of constipation by starting medications before you are constipated.  You can take Miralax   (1 packet as directed) and/or a stool softener (Senokot 1-2 tablets 1-2 times a day).  If you already have constipation and these medications are not working, you can get magnesium citrate and use as directed.  If you continue to have constipation you can try an over the counter suppository or enema.  Call your Surgeon Team if it has been greater than 3 days since your last bowel movement.     Reduced Urine Output   Order Comments: Changes in the amount of fluids you drank before and after surgery may result in problems urinating.  It is important to stay well-hydrated after surgery and drink plenty of water. If it has been greater than 8 hours since you have urinated despite drinking plenty of water, call your Surgeon Team.     Anticoagulation - aspirin   Order Comments: Take the aspirin as prescribed for a total of four weeks after surgery.  This is given to help minimize your risk of blood clot.     Exercises to prevent blood clots   Order Comments: Unless otherwise directed by your Surgeon team, perform the following exercises at least three times per day for the first four weeks after surgery to prevent blood clots in your legs: 1) Point and flex your feet (Ankle Pumps), 2) Move your ankle around in big circles, 3) Wiggle your toes, 4) Walk, even for short distances, several times a day, will help decrease the risk of blood  "clots.     Pain after Surgery   Order Comments: Pain after surgery is normal and expected.  You will have some amount of pain for several weeks after surgery.  Your pain will improve with time.  There are several things you can do to help reduce your pain including: rest, ice, elevation, and using pain medications as needed. Contact your Surgeon Team if you have pain that persists or worsens after surgery despite rest, ice, elevation, and taking your medication(s) as prescribed. Contact your Surgeon Team if you have new numbness, tingling, or weakness in your operative extremity.     Swelling after Surgery   Order Comments: Swelling and/or bruising of the surgical extremity is common and may persist for several months after surgery. In addition to frequent icing and elevation, gentle compressive support with an ACE wrap or tubigrip may help with swelling. Apply compression regularly, removing at least twice daily to perform skin checks. Contact your Surgeon Team if your swelling increases and is NOT associated with an increase in your activity level, or if your swelling increases and is associated with redness and pain.     LOWER Extremity Elevation   Order Comments: Swelling is expected for several months after surgery. This type of swelling is usually associated with gravity and activity, and can be improved with elevation.   The best way to do this is to get your \"toes above your nose\" by laying down and placing several pillows lengthwise under your calf and heel. When elevating your leg keep your knee completely straight. Perform this elevation as often as possible especially for the first two weeks after surgery.     Cold therapy   Order Comments: Ice can be used to control swelling and discomfort after surgery. Place a thin towel over your operative site and apply the ice pack overtop. Leave ice pack in place for 20 minutes, then remove for 20 minutes. Repeat this 20 minutes on/20 minutes off routine as often " as tolerated.     acetaminophen (TYLENOL) Instructions   Order Comments: You were discharged with acetaminophen (TYLENOL) for pain management after surgery. Acetaminophen most effectively manages pain symptoms when it is taken on a schedule without missing doses (every four, six, or eight hours). Your Provider will prescribe a safe daily dose between 3000 - 4000 mg.  Do NOT exceed this daily dose. Most patients use acetaminophen for pain control for the first four weeks after surgery.  You can wean from this medication as your pain decreases.     NSAID Instructions   Order Comments: You were discharged with an anti-inflammatory medication for pain management to use in combination with acetaminophen (TYLENOL) and the narcotic pain medication.  Take this medication exactly as directed.  You should only take one anti-inflammatory at a time.  Some common anti-inflammatories include: ibuprofen (ADVIL, MOTRIN), naproxen (ALEVE, NAPROSYN), celecoxib (CELEBREX), meloxicam (MOBIC), ketorolac (TORADOL).  Take this medication with food and water.     Opioids - Tapering Instructions   Order Comments: In the first three days following surgery, your symptoms may warrant use of the narcotic pain medication every four to six hours as prescribed. This is normal. As your pain symptoms improve, focus your efforts on decreasing (tapering) use of narcotic medications. The most successful tapering strategy is to first, decrease the number of tablets you take every 4-6 hours to the minimum prescribed. Then, increase the amount of time between doses.  For example:  First, taper to   or 1 tablet every 4-6 hours.  Then, taper to   or 1 tablet every 6-8 hours.  Then, taper to   or 1 tablet every 8-10 hours.  Then, taper to   or 1 tablet every 10-12 hours.  Then, taper to   or 1 tablet at bedtime.  The bedtime dose can help with comfort during sleep and is typically the last dose to be discontinued after surgery.     Follow Up Care   Order  Comments: You are scheduled for a post operative wound check with Dr. Otero's clinic approximately two weeks after surgery. At approximately six weeks after surgery, you will see Dr. Otero in clinic. During this visit, repeat X rays of your operative hip will be performed.      Your follow up appointments will be at the location that you regularly see Dr. Otero:    [facility]  [address] [place] [address] [phone]    [facility]  [address] [place] [id number] [phone]     Opioid Instructions (Greater than or equal to 65 years)   Order Comments: You were discharged with an opioid medication (hydromorphone, oxycodone, hydrocodone, or tramadol). This medication should only be taken for breakthrough pain that is not controlled with acetaminophen (TYLENOL). If you rate your pain less than 3 you do not need this medication.  Pain rating 0-3:  You do not need this medication  Pain rating 4-6:  Take 1/2 tablet every 4-6 hours as needed  Pain rating 7-10:  Take 1 tablet every 4-6 hours as needed  Do not exceed 4 tablets per day     Activity - Total Knee Arthroplasty     Order Specific Question Answer Comments   Is discharge order? Yes      Return to Driving   Order Comments: Driving is NOT permitted until directed by your provider. Under no circumstance are you permitted to drive while using opioid pain medications.     Return to athletic activities   Order Comments: Activities such as swimming, bicycling, jogging, running, and stop-and-go sports should be avoided until permitted by your Provider.     Return to school/work   Order Comments: You may return to work/school when directed by your Provider.     Weight bearing as tolerated   Order Comments: Weight bearing as tolerated on your operative extremity.     Wound care   Order Comments: You have a clean dressing on your surgical wound. Dressing change instructions as  follows: remove your dressing in seven days, and leave incision open to air. Contact your Surgeon Team if you have increased redness, warmth around the surgical wound, and/or drainage from the surgical wound.     Shower with wound/dressing covered   Order Comments: You must COVER your dressing or incision with saran wrap (or any other non-permeable covering) to allow the incision to remain dry while showering.  You may shower one day after surgery as long as the surgical wound stays dry. Continue to cover your dressing or incision for showering until your first office visit.  You are strictly prohibited from soaking or submerging the surgical wound underwater.     NO Tub bathing   Order Comments: Tub bathing, swimming, or any other activities that will cause your incision to be submerged in water should be avoided until allowed by your Surgeon.     MD face to face encounter   Order Comments: Documentation of Face to Face and Certification for Home Health Services    I certify that patient: Dimple Oviedo is under my care and that I, or a nurse practitioner or physician's assistant working with me, had a face-to-face encounter that meets the physician face-to-face encounter requirements with this patient on: 11/10/2020.    This encounter with the patient was in whole, or in part, for the following medical condition, which is the primary reason for home health care: TOTAL KNEE ARTHROPLASTY    I certify that, based on my findings, the following services are medically necessary home health services: Physical therapy, and skilled nursing    My clinical findings support the need for the above services because: Physical Therapy needed to assess and treat the above functional impairments: decreases mobility need for continued physical therapy for strengthening/ROM and increase independence with all functional motility/gait. Skilled RN for overall assessment of medical status.    Further, I certify that my clinical findings  support that this patient is homebound (i.e. absences from home require considerable and taxing effort and are for medical reasons or [employer] services or infrequently or of short duration when for other reasons) because: Requires assistance of another person or specialized equipment to access medical services because patient: Patient unable to drive and range of motion limitations prevents ability to exit home safely and requires supervision of another for safe transfer.    Based on the above findings. I certify that this patient is confined to the home and needs intermittent skilled nursing care, physical therapy and/or speech therapy.  The patient is under my care, and I have initiated the establishment of the plan of care.  This patient will be followed by a physician who will periodically review the plan of care.  Physician/Provider to provide follow up care: Pop Zapien    Attending hospital physician (the Medicare certified [place] provider): Edward Otero MD  Physician Signature: See electronic signature associated with these discharge orders.  Date: 11/10/2020     Follow Up and recommended labs and tests   Order Comments: Follow up with PCP in 5-7 days.   BMP check 11/13  Hold lasix today and tomorrow  Check BP daily     Crutches DME   Order Comments: DME Documentation: Describe the reason for need to support medical necessity: Impaired gait status post knee surgery. I, the undersigned, certify that the above prescribed supplies are medically necessary for this patient and is both reasonable and necessary in reference to accepted standards of medical practice in the treatment of this patient's condition and is not prescribed as a convenience.     Order Specific Question Answer Comments   DME Provider: [facility]    Crutch Type: Standard    Crutches Add On: NA    Length of Need: Lifetime      Cane DME   Order Comments: DME Documentation: Describe the reason for need to support medical  necessity: Impaired gait status post knee surgery. I, the undersigned, certify that the above prescribed supplies are medically necessary for this patient and is both reasonable and necessary in reference to accepted standards of medical practice in the treatment of this patient's condition and is not prescribed as a convenience.     Order Specific Question Answer Comments   DME Provider: [facility]    Cane Type: Single Tip    Reminder: Patient can typically get 1 every 5 years      Walker DME   Order Comments: DME Documentation: Describe the reason for need to support medical necessity: Impaired gait status post knee surgery. I, the undersigned, certify that the above prescribed supplies are medically necessary for this patient and is both reasonable and necessary in reference to accepted standards of medical practice in the treatment of this patient's condition and is not prescribed as a convenience.     Order Specific Question Answer Comments   DME Provider: [facility]    Walker Type: Standard (2 Wheel)      Regular Diet Adult     Order Specific Question Answer Comments   Is discharge order? Yes      Assign Questionnaire Series to Patient     Patient was discussed with Dr. Otero on the day of discharge.      Froilan Villegas MD  Orthopaedic Surgery PGY-4  #: [phone]

## 2020-11-09 NOTE — ANESTHESIA PREPROCEDURE EVALUATION
Anesthesia Pre-Procedure Evaluation    Patient: Dimple Oviedo   MRN:     [id number] Gender:   female   Age:    87 year old :      1933        Preoperative Diagnosis: * No surgery found *        LABS:  CBC:   Lab Results   Component Value Date    WBC 6.4 10/28/2020    WBC 11.2 (H) 2020    HGB 13.0 10/28/2020    HGB 13.0 2020    HCT 40.4 10/28/2020    HCT 40.2 2020     10/28/2020     2020     BMP:   Lab Results   Component Value Date     2020     08/10/2020    POTASSIUM 4.4 2020    POTASSIUM 4.7 2020    CHLORIDE 104 2020    CHLORIDE 105 08/10/2020    CO2 30 2020    CO2 28 08/10/2020    BUN 23 2020    BUN 19 08/10/2020    CR 1.03 2020    CR 1.05 (H) 08/10/2020    GLC 93 2020     (H) 2020     COAGS:   Lab Results   Component Value Date    PTT 27 2020    INR 0.95 2020     POC:   Lab Results   Component Value Date    BGM 99 2018    HCGS Negative 2017     OTHER:   Lab Results   Component Value Date    LACT 1.0 2017    A1C 6.2 (H) 2017    SHREYA 9.0 2020    PHOS 2.4 (L) 2017    MAG 1.8 2019    ALBUMIN 3.6 2020    PROTTOTAL 7.5 2020    ALT 20 2020    AST 16 2020    ALKPHOS 97 2020    BILITOTAL 0.4 2020    LIPASE 91 2018    AMYLASE 44 10/29/2015    TSH 3.39 2020    T4 0.73 (L) 2020    T3 73 2020    CRP <2.9 10/06/2017    SED 25 10/06/2017        Preop Vitals    BP Readings from Last 3 Encounters:   20 135/61   20 130/78   20 (!) 168/62    Pulse Readings from Last 3 Encounters:   20 64   20 58   20 68      Resp Readings from Last 3 Encounters:   20 20   20 16   20 16    SpO2 Readings from Last 3 Encounters:   20 98%   20 97%   20 95%      Temp Readings from Last 1 Encounters:   20 36.6  C (97.9  F) (Oral)    Ht Readings  "from Last 1 Encounters:   11/09/20 1.448 m (4' 9\")      Wt Readings from Last 1 Encounters:   11/09/20 76.8 kg (169 lb 5 oz)    Estimated body mass index is 36.64 kg/m  as calculated from the following:    Height as of an earlier encounter on 11/9/20: 1.448 m (4' 9\").    Weight as of an earlier encounter on 11/9/20: 76.8 kg (169 lb 5 oz).     LDA:  Urethral Catheter Double-lumen;Latex;Straight-tip;Silver coated 16 fr (Active)   Number of days: 727        Past Medical History:   Diagnosis Date     Calculus of kidney      Chemotherapy-induced neutropenia (H) 3/6/2019     Esophageal reflux      GERD (gastroesophageal reflux disease)      Hyperlipidemia LDL goal <130 5/9/2010     Malignant melanoma of skin of trunk, except scrotum (H)      Nonspecific abnormal finding     has living will 2004 -      Nontoxic multinodular goiter     no further eval /tx rec per pt     Osteopenia      Other psoriasis      Personal history of colonic polyps      PMR (polymyalgia rheumatica) (H)      Stress-induced cardiomyopathy      Undiagnosed cardiac murmurs      Unspecified constipation      Unspecified essential hypertension      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      Past Surgical History:   Procedure Laterality Date     BIOPSY       COLONOSCOPY  2014     COMBINED CYSTOSCOPY, INSERT STENT URETER(S) Bilateral 9/12/2018    Procedure: COMBINED CYSTOSCOPY, INSERT STENT URETER(S);  Cystoscopy Bilateral ureteral Stent Placement.;  Surgeon: Justin Henry MD;  [facility]     COMBINED CYSTOSCOPY, RETROGRADES, URETEROSCOPY, INSERT STENT Bilateral 4/3/2018    Procedure: COMBINED CYSTOSCOPY, RETROGRADES, URETEROSCOPY, INSERT STENT;;  Surgeon: Stuart King MD;  [facility]     COMBINED CYSTOSCOPY, RETROGRADES, URETEROSCOPY, INSERT STENT Bilateral 9/10/2018    Procedure: COMBINED CYSTOSCOPY, RETROGRADES, URETEROSCOPY, INSERT STENT;  Cystoscopy, Bilateral Ureteroscopy, Bladder Biopsies, Retrogram Pyelograms, Ureteral " Washings and brushings, cysview;  Surgeon: Stuart King MD;  [facility]     COMBINED CYSTOSCOPY, RETROGRADES, URETEROSCOPY, INSERT STENT Left 8/26/2020    Procedure: Cystoscopy, Left Ureteral Wash, Left ureteral Retrograde Pyelogram;  Surgeon: Justin Henry MD;  [facility]     CYSTOSCOPY, BIOPSY BLADDER INSTILL OPTICAL AGENT N/A 4/3/2018    Procedure: CYSTOSCOPY, BIOPSY BLADDER INSTILL OPTICAL AGENT;  Cystoscopy, Blue Light Cystoscopy, Bladder Biopsies, Bilateral Selective ureteral washings for Cytology, Bilateral Retrograde Pyelograms, Bilateral Ureteroscopy;  Surgeon: Stuart King MD;  [facility]     CYSTOSCOPY, BIOPSY BLADDER, COMBINED N/A 2/19/2018    Procedure: COMBINED CYSTOSCOPY, BIOPSY BLADDER;  Cystoscopy, Bladder Biopsy;  Surgeon: Kenna La MD;  [facility]     CYSTOSCOPY, BIOPSY BLADDER, COMBINED N/A 8/26/2020    Procedure: Cystoscopy, biopsy bladder, combined;  Surgeon: Justin Henry MD;  [facility]     CYSTOSCOPY, FULGURATE BLADDER TUMOR, COMBINED N/A 1/13/2020    Procedure: CYSTOSCOPY, WITH  bladder biopsies and fulguration;  Surgeon: Stuart King MD;  [facility]     CYSTOSCOPY, REMOVE STENT(S), COMBINED  11/13/2018    Procedure: Flexible Cystoscopy with Stent Removal;  Surgeon: Stuart King MD;  [facility]     DAVINCI LYMPHADENECTOMY N/A 11/13/2018    Procedure: Davinci Lymphadenectomy ;  Surgeon: Stuart King MD;  [facility]     DAVINCI NEPHROURETERECTOMY N/A 11/13/2018    Procedure: Right DaVinci Assisted Nephroureterectomy;  Surgeon: Stuart King MD;  [facility]     ENDOSCOPIC ULTRASOUND LOWER GASTROINTESTIONAL TRACT (GI) N/A 10/30/2015    Procedure: ENDOSCOPIC ULTRASOUND LOWER GASTROINTESTIONAL TRACT (GI);  Surgeon: Daniel Jean-Baptiste MD;  [facility]     EYE SURGERY  12/4/17     INSERT PORT VASCULAR ACCESS Right 12/19/2018    Procedure:  Chest Port Placement - right;  Surgeon: Stuart Chavez PA-C;  [facility]     IR CHEST PORT PLACEMENT > 5 YRS OF AGE  12/19/2018     IR PORT REMOVAL RIGHT  6/26/2019     LAPAROSCOPIC CHOLECYSTECTOMY WITH CHOLANGIOGRAMS N/A 11/1/2015    Procedure: LAPAROSCOPIC CHOLECYSTECTOMY WITH CHOLANGIOGRAMS;  Surgeon: Tonie Warren MD;  [facility]     REMOVE PORT VASCULAR ACCESS Right 6/26/2019    Procedure: Right Port Removal;  Surgeon: Froilan Irizarry PA-C;  [facility]     SURGICAL HISTORY OF -   1996    malignant melanoma     SURGICAL HISTORY OF -   1968    thyroid nodule     SURGICAL HISTORY OF -       D & C     ZZC NONSPECIFIC PROCEDURE  2005    colonoscopy polyp repeat 2010      Allergies   Allergen Reactions     Codeine         Anesthesia Evaluation     . Pt has had prior anesthetic. Type: General    History of anesthetic complications   - PONV        ROS/MED HX    ENT/Pulmonary:     (+)JESS risk factors snores loudly, hypertension, , . .   (-) tobacco use and asthma   Neurologic:     (+)other neuro tremors, RLS   (-) seizures and Neuropathy   Cardiovascular: Comment: AF with RVR in setting of hyperthyroidism. Spontaneous conversion. Managed with B-blocker, ACEI, statin and ASA.     Chronic bilateral lower extremity lymphedema.        (+) Dyslipidemia, hypertension----. Taking blood thinners : . CHF etiology: Stress induced cardiomyopathy 12/13/17 Last EF: 60-65% date: 9/2/20 . . :. dysrhythmias a-fib, valvular problems/murmurs type: AS and AI . Previous cardiac testing Echodate:9/2020results:Interpretation Summary  Global and regional left ventricular function is normal with an EF of 60-65%.  Grade II or moderate diastolic dysfunction.  The right ventricle is normal size.Global right ventricular function is  normal.  Moderate aortic stenosis is present. Mild aortic insufficiency is present.  This study was compared with the study from 09/11/2018. There has been no  change.date:  results:ECG reviewed date:2/3/19 results:SR, PACs, LADCath date: 12/2017 results:         (-) HERMAN, orthopnea/PND and syncope   METS/Exercise Tolerance: Comment: Limited due to knee pain- reports she has been walking laps [place]- about 3 at a time with her walker. 3 - Able to walk 1-2 blocks without stopping   Hematologic:  - neg hematologic  ROS      (-) History of Transfusion   Musculoskeletal: Comment: Osteopenia    Osteoarthritis in knees bilaterally. Right hip pain. Lumbar stenosis, LBP.  (+) arthritis,  -       GI/Hepatic:     (+) GERD (No symptoms on day of surgery) Asymptomatic on medication, cholecystitis/cholelithiasis (s/p cholecystectomy),       Renal/Genitourinary: Comment: S/P nephroureterectomy 11/2018    (+) chronic renal disease, type: CRI, Nephrolithiasis , Pt does not require dialysis, Pt has no history of transplant,       Endo:     (+) thyroid problem (Graves disease)  hyperthyroidism, Obesity, .      Psychiatric:     (+) psychiatric history depression      Infectious Disease:  - neg infectious disease ROS       Malignancy:   (+) Malignancy History of Other  Skin CA status post Surgery, Other CA Urothelial cancer, bladder cancer status post Surgery and Chemo         Other:    (+) C-spine cleared: N/A, no H/O Chronic Pain,no other significant disability                        PHYSICAL EXAM:   Mental Status/Neuro: A/A/O   Airway: Facies: Feasible  Mallampati: II  Mouth/Opening: Full  TM distance: > 6 cm  Neck ROM: Full   Respiratory: Auscultation: CTAB     Resp. Rate: Normal     Resp. Effort: Normal      CV: Rhythm: Regular  Rate: Age appropriate  Heart: Normal Sounds  Edema: None   Comments:      Dental: Normal Dentition                Assessment:   ASA SCORE: 3    H&P: History and physical reviewed and following examination; no interval change.   Smoking Status:  Non-Smoker/Unknown   NPO Status: Will be NPO Appropriate at ... 11/9/2020 1:00 PM     Plan:   Anes. Type:  MAC;  Epidural     Epidural Details:  Catheter; Lumbar   Pre-Medication: None   Induction:  N/a   Airway: Native Airway   Access/Monitoring: PIV   Maintenance: N/a     Postop Plan:   Postop Pain: Opioids  Postop Sedation/Airway: Not planned  Disposition: Inpatient/Admit     PONV Management:   Adult Risk Factors: Female, Non-Smoker, Postop Opioids   Prevention: Ondansetron     CONSENT: Direct conversation   Plan and risks discussed with: Patient   Blood Products: Consented (ALL Blood Products)       Comments for Plan/Consent:  Discussed risks of epidural including need for replacement, low blood pressure, bleeding, infection, nerve injury, headache and possible treatment with blood patch.  Discussed backup conversion to general anesthesia. Discussed plan for MAC, including risk of aspiration. Discussed risks of backup general anesthesia, including sore throat/hoarse voice, abrasions/damage to lips/tongue/teeth, nausea, rare complications (including medication reactions, cardiac, pulmonary).    Patient has had a few MAC cases this year, with no issues. Moderate aortic stenosis. Will plan for slow titration of epidural for case, in addition to sedation. Discussed possible arterial line if needed.                     PAC Discussion and Assessment    ASA Classification: 3  Case is suitable for: [facility] [place]  Anesthetic techniques and relevant risks discussed: Regional  Invasive monitoring and risk discussed:   Types:   Possibility and Risk of blood transfusion discussed:   NPO instructions given:   Additional anesthetic preparation and risks discussed:   Needs early admission to pre-op area:   Other:     PAC Resident/NP Anesthesia Assessment:  Dimple Oviedo is an 87 year old female scheduled for R TKA on 11/9/20 by Dr. Otero in treatment of OA.  PAC referral for risk assessment and optimization for anesthesia with comorbid conditions of hypertension, dyslipidemia, CHF, h/o a fib, RLS, GERD, anxiety, urothelial carcinoma,  polymyalgia rheumatica:    Pre-operative considerations:  1.  Cardiac:  Functional status- METS 3- reports she has been walking laps [place] (using a walker). States she does about 3 laps at a time and thinks she can walk equivalent to one block before taking a break. Denies cardiac symptoms. Previous cardiac testing as above.  H/o stress induced cardiomyopathy with reduced EF that has since resolved.  H/o paroxysmal atrial fibrillation without recent symptoms maintained on propranolol- most recent EKG showed SR with PACs. She is also using irbesartan for hypertension, lovastatin for dyslipidemia and lasix for edema.  She was seen by Dr. Griffith 10/14/20 and was approved for the above procedure at adequate risk from a cardiac perspective . She will see Dr. Griffith for follow up after surgery to discuss possibly restarting anticoagulation. intermediate risk surgery with 0.9% (RCRI #) risk of major adverse cardiac event.   2.  Pulm:   JESS risk: intermediate. Non smoker. Denies pulmonary symptoms. COVID testing ordered per surgery team.   3.  GI:  Risk of PONV score = 3.  If > 2, anti-emetic intervention recommended. GERD controlled using prilosec.   4.  Neuro: RLS using requip.   5. Endo: hyperthyroid using methimazole  6. Psych: anxiety and depression using zoloft and trazodone. States symptoms have improved.    7. : h/o urothelial carcinoma s/p surgery and chemotherapy. Most recent cystoscopy completed 8/26/2020. Patient states 3 infusions of maintenance BCG will be scheduled after knee replacement per her urology team.   8. Msk: right knee OA with above procedure planned. She has an appointment with Dr. Otero 10/28/20 to discuss upcoming surgery. We will complete preop labs (T&S and CBC) when she is in the building for that visit.   9. Access: patient reports h/o difficult IV access  10. ERAS initiated    VTE risk: 0.5%    Patient is optimized and is acceptable candidate for the proposed  procedure.  No further diagnostic evaluation is needed.     Patient discussed with Dr. Shrestha    **Physical exam and vital signs not completed today as this visit was scheduled as a virtual visit during Covid 19 pandemic. Physical exam should be completed the DOS in pre-op**    **For further details of assessment and testing please see H and P completed on same date.      Kylie Dumont PA-C        Mid-Level Provider/Resident:   Date:   Time:     Attending Anesthesiologist Anesthesia Assessment:        Anesthesiologist:   Date:   Time:   Pass/Fail:   Disposition:     PAC Pharmacist Assessment:        Pharmacist:   Date:   Time:    Gemini Driver MD

## 2020-11-09 NOTE — OP NOTE
PREOPERATIVE DIAGNOSIS: Degenerative arthritis, right knee.  POSTOPERATIVE DIAGNOSIS: Same as pre-op.  PROCEDURE: right Total Knee Arthroplasty  DATE OF SURGERY: 11/9/2020   ASSISTANTS: Froilan Villegas MD; ALYSSA Lazo    COMPONENTS USED:  1. Luis Persona Size 4 PS Femoral Component  2. Luis Persona Size C Tibial Component  3. Luis Size 32 Patellar Button    4. Luis Persona PS Polyethylene Liner, Size 14    ANESTHESIA: Spinal  COMPLICATIONS: None  EBL: 100cc  FINDINGS: Preop ROM under anesthesia: 15 to 110.  Cartilage loss, sclerotic bone, and osteophyte formation of the medial, lateral and patellofemoral compartment.  Femoral, tibial, and patellar components cemented into place without complication.  Following placement of the polyethylene liner the knee appeared to be in neutral alignment, have full extension, flexion to 130, good patellar tracking, and stable to to anterior and posterior stress in flexion and varus/valgus stress in extension, 30 degrees, and 90 degrees.  The wound was closed without tension.      INDICATIONS: Dimple Oviedo is a 87 year old female with longstanding history of right knee pain.  The patient was recently seen in clinic and found to have degenerative arthritis of the knee.  The patient was followed and noted to have failed conservative treatment options. Radiographs and preoperative clinical symptoms are consistent with degenerative arthritis as the cause of the patient's pain.   We discussed the risks, benefits, and alternatives to total knee arthroplasty with the patient.  Please see clinic note for full details of the preoperative discussion.  Following that the discussion, the patient elected to proceed with total knee arthroplasty.      DESCRIPTION OF PROCEDURE: We met the patient in the preoperative area.  There we again reviewed the risks, benefits, and alternatives to total knee arthroplasty.  Informed written consent was obtained.  The operative right knee  was marked with an indelible marker after patient confirmation of the operative site.   Preoperative exam was notable for:   RLE:  Skin clean and dry on the surgical leg  EHL/fhl/gs/at intact  Acworth: dp/sp/t/s/s  2+ dp pulse     All the patient's questions were answered.  The patient was taken to the operating room and underwent induction of  Spinal anesthesia.  The patient was placed on the operating table in the supine position.  Bony prominences were well padded and the patient was secured to the table in the standard fashion.  An SCD was placed on the nonoperative leg.   A tourniquet was placed on the operative thigh and the patient was prepped and draped in the normal sterile fashion.       A timeout was performed in which the patient's name, MRN, imaging, preoperative plan and laterality was reviewed.  We also confirmed that the patient received the appropriate preoperative IV antibiotics.           A standard midline incision was made. Dissection was carried down to the knee retinaculum and the quadriceps tendon. A standard medial parapatellar arthrotomy was performed. Subperiosteal dissection was carried out along the medial proximal tibia. The fat pad was removed.  Care was taken to protect the patellar tendon and extensor mechanism during fat pad removal. The tissue along the anterior aspect of the distal femur was also removed.  The patella was subluxed and the knee was flexed.     The ACL and PCL were released.  A drill was advanced down the femoral canal in a retrograde direction. The sword was set at 5 degrees of valgus in accordance with the preoperative plan and was advanced into the canal.  2 additional mm resected due to flexion contracture.  The distal femoral cutting block was then placed and pinned. The chelsey was removed and the distal femur cuts were made medially and laterally.  The cutting block was removed and the chelsey with the alignment piece was inserted into the canal to ensure proper angle  of the cut and a flat cut.      The posterior referencing femoral sizing block was used and the femur was measured to be a size 4.  Two holes were drilled to obtain 3 degrees of external rotation with respect to the posterior condylar axis of the femur.  The femoral cutting block was then slid over the pins and fixed onto the bone.  The anterior cut, posterior cut, anterior and posterior chamfer cuts were made.  The block was then removed and excess bone fragments were removed.     Attention was then directed towards the proximal tibia. The meniscus and soft tissue was removed to expose the medial and lateral tibial plateau. Retractors were placed around the knee to obtain good visualization, then the extramedullary tibial alignment guide was placed in the appropriate position. The 2-10 guide was used to select the resection level of the tibia and the cutting guide was pinned into position. With care directed towards preserving the posterior nerves and blood vessels and the medial collateral ligament, the saw was used to cut the proximal tibia.     Lamina spreaders were used to visualize the posterior knee. A curved osteotome was used to remove posterior osteophytes. Remnants of meniscus were also removed. Our anesthetic injection was then introduced through the posterior capsule with extreme care directed towards not injuring the posterior neurovascular structures.  The posterolateral capsular tissue was avoided.  The sizing blocks were used to measure and confirm appropriate sizing of the flexion and extension gaps.  The drop chelsey was used to confirm proper alignment of the cut.    The proximal tibia was again exposed and sized. The tibia was measured to be a size C.   The tibial trial was placed into proper rotation and pinned into place.      The femoral trial was placed and the box cutting guide was used and the notch cut was performed. The excess bone was removed.  The lug holes were drilled.  The trial liner  was placed. The knee was found to have excellent range of motion from full extension to 130 of flexion and was stable to anterior and posterior stress in flexion and extension as well as varus/valgus stress in 0, 30, and 90 degrees of flexion.     The patella was then exposed.  Soft tissues were removed around the patella for visualization. The patella thickness was measured with a caliper to be 20, and a measured resection of 8 mm was made.  A caliper was then used to measure the residual thickness of the patella to be 12.  The patella was measured with the lollipops and found to be a size 32. The drill guide was placed and the three lug holes were drilled. The patella trial was then placed and the knee was ranged. The patella was found to track well.  The tourniquet was inflated with the use of an esmarch.    The patellar and femoral trials were removed and the final tibial preparation was done. The stove pipe was used to guide the drill, and then the wing punch was used. The tibial trial was then removed.     A bone plug was placed into the femur and then pulsitile lavage was used to clean the bone in preparation for cementing. The bone was dried. Cement was mixed, and then all the components were cemented into place. While the cement was hardening, the remainder of the anesthetic injection was spread around the deep retinacular layer and the subcutaneous layer with a spinal needle.   The knee was irrigated with betadine lavage for approximately 3 minutes.  Once the cement had hardened, the excess cement was removed.  The tourniquet was released. Total tourniquet time was 20 minutes.  Hemostasis was obtained.      Various trial liners were then used. The 14 mm liner fit best and the knee had full extension to 130 degrees and was stable to anterior and posterior stress in flexion and extension as well as varus/valgus stress in 0, 30, and 90 degrees of flexion.  The trial liner was removed and the real liner was  placed.      The knee was again copiously irrigated.  No drain was used.  Closure was performed with a #1 symmetric stratafix in the retinacular layer, 2-0 stratafix spiral in the  subcutaneous tissues, and monocryl in the skin.  A sterile dressing was applied.  The patient was then awakened, transferred to the [facility], and brought to the recovery room in stable condition. There were no complications.

## 2020-11-10 ENCOUNTER — RECORDS - HEALTHEAST (OUTPATIENT)
Dept: ADMINISTRATIVE | Facility: OTHER | Age: 85
End: 2020-11-10

## 2020-11-10 ENCOUNTER — APPOINTMENT (OUTPATIENT)
Dept: OCCUPATIONAL THERAPY | Facility: CLINIC | Age: 85
End: 2020-11-10
Attending: PHYSICIAN ASSISTANT
Payer: MEDICARE

## 2020-11-10 ENCOUNTER — APPOINTMENT (OUTPATIENT)
Dept: PHYSICAL THERAPY | Facility: CLINIC | Age: 85
End: 2020-11-10
Attending: ORTHOPAEDIC SURGERY
Payer: MEDICARE

## 2020-11-10 LAB
ANION GAP SERPL CALCULATED.3IONS-SCNC: 4 MMOL/L (ref 3–14)
ANION GAP SERPL CALCULATED.3IONS-SCNC: 7 MMOL/L (ref 3–14)
BUN SERPL-MCNC: 20 MG/DL (ref 7–30)
BUN SERPL-MCNC: 21 MG/DL (ref 7–30)
CALCIUM SERPL-MCNC: 7.3 MG/DL (ref 8.5–10.1)
CALCIUM SERPL-MCNC: 8.1 MG/DL (ref 8.5–10.1)
CHLORIDE SERPL-SCNC: 100 MMOL/L (ref 94–109)
CHLORIDE SERPL-SCNC: 101 MMOL/L (ref 94–109)
CO2 SERPL-SCNC: 23 MMOL/L (ref 20–32)
CO2 SERPL-SCNC: 28 MMOL/L (ref 20–32)
CREAT SERPL-MCNC: 1.08 MG/DL (ref 0.52–1.04)
CREAT SERPL-MCNC: 1.25 MG/DL (ref 0.52–1.04)
GFR SERPL CREATININE-BSD FRML MDRD: 39 ML/MIN/{1.73_M2}
GFR SERPL CREATININE-BSD FRML MDRD: 46 ML/MIN/{1.73_M2}
GLUCOSE SERPL-MCNC: 140 MG/DL (ref 70–99)
GLUCOSE SERPL-MCNC: 99 MG/DL (ref 70–99)
HGB BLD-MCNC: 10.3 G/DL (ref 11.7–15.7)
POTASSIUM SERPL-SCNC: 3.8 MMOL/L (ref 3.4–5.3)
POTASSIUM SERPL-SCNC: 4.2 MMOL/L (ref 3.4–5.3)
SODIUM SERPL-SCNC: 130 MMOL/L (ref 133–144)
SODIUM SERPL-SCNC: 133 MMOL/L (ref 133–144)

## 2020-11-10 PROCEDURE — 250N000013 HC RX MED GY IP 250 OP 250 PS 637: Performed by: INTERNAL MEDICINE

## 2020-11-10 PROCEDURE — 97161 PT EVAL LOW COMPLEX 20 MIN: CPT | Mod: GP

## 2020-11-10 PROCEDURE — 99214 OFFICE O/P EST MOD 30 MIN: CPT | Performed by: INTERNAL MEDICINE

## 2020-11-10 PROCEDURE — 85018 HEMOGLOBIN: CPT | Performed by: PHYSICIAN ASSISTANT

## 2020-11-10 PROCEDURE — 80048 BASIC METABOLIC PNL TOTAL CA: CPT | Performed by: PHYSICIAN ASSISTANT

## 2020-11-10 PROCEDURE — 250N000011 HC RX IP 250 OP 636: Performed by: PHYSICIAN ASSISTANT

## 2020-11-10 PROCEDURE — 258N000003 HC RX IP 258 OP 636: Performed by: INTERNAL MEDICINE

## 2020-11-10 PROCEDURE — 97530 THERAPEUTIC ACTIVITIES: CPT | Mod: GP

## 2020-11-10 PROCEDURE — 250N000013 HC RX MED GY IP 250 OP 250 PS 637: Mod: GY | Performed by: PHYSICIAN ASSISTANT

## 2020-11-10 PROCEDURE — 97535 SELF CARE MNGMENT TRAINING: CPT | Mod: GO,59

## 2020-11-10 PROCEDURE — 97116 GAIT TRAINING THERAPY: CPT | Mod: GP

## 2020-11-10 PROCEDURE — 97110 THERAPEUTIC EXERCISES: CPT | Mod: GP

## 2020-11-10 PROCEDURE — 80048 BASIC METABOLIC PNL TOTAL CA: CPT | Mod: 91 | Performed by: INTERNAL MEDICINE

## 2020-11-10 PROCEDURE — 97165 OT EVAL LOW COMPLEX 30 MIN: CPT | Mod: GO

## 2020-11-10 PROCEDURE — 36415 COLL VENOUS BLD VENIPUNCTURE: CPT | Performed by: PHYSICIAN ASSISTANT

## 2020-11-10 PROCEDURE — 36415 COLL VENOUS BLD VENIPUNCTURE: CPT | Performed by: INTERNAL MEDICINE

## 2020-11-10 RX ORDER — POLYETHYLENE GLYCOL 3350 17 G/17G
17 POWDER, FOR SOLUTION ORAL DAILY
Qty: 10 PACKET | Refills: 0 | Status: SHIPPED | OUTPATIENT
Start: 2020-11-11 | End: 2021-10-27

## 2020-11-10 RX ORDER — ASPIRIN 81 MG/1
81 TABLET, CHEWABLE ORAL DAILY
Status: ON HOLD | COMMUNITY
End: 2020-11-11

## 2020-11-10 RX ORDER — AMOXICILLIN 250 MG
1-2 CAPSULE ORAL 2 TIMES DAILY PRN
Qty: 30 TABLET | Refills: 0 | Status: SHIPPED | OUTPATIENT
Start: 2020-11-10 | End: 2021-10-27

## 2020-11-10 RX ORDER — ONDANSETRON 4 MG/1
4 TABLET, ORALLY DISINTEGRATING ORAL EVERY 6 HOURS PRN
Qty: 20 TABLET | Refills: 0 | Status: SHIPPED | OUTPATIENT
Start: 2020-11-10 | End: 2022-05-02

## 2020-11-10 RX ORDER — ACETAMINOPHEN 325 MG/1
975 TABLET ORAL EVERY 8 HOURS PRN
Qty: 1 BOTTLE | Refills: 0 | Status: SHIPPED | OUTPATIENT
Start: 2020-11-10 | End: 2021-10-27

## 2020-11-10 RX ORDER — OXYCODONE HYDROCHLORIDE 5 MG/1
5 TABLET ORAL EVERY 4 HOURS PRN
Qty: 20 TABLET | Refills: 0 | Status: SHIPPED | OUTPATIENT
Start: 2020-11-10 | End: 2020-11-18

## 2020-11-10 RX ORDER — HYDROXYZINE HYDROCHLORIDE 10 MG/1
10 TABLET, FILM COATED ORAL EVERY 6 HOURS PRN
Qty: 10 TABLET | Refills: 0 | Status: SHIPPED | OUTPATIENT
Start: 2020-11-10 | End: 2020-11-18

## 2020-11-10 RX ORDER — ROPINIROLE 0.25 MG/1
TABLET, FILM COATED ORAL
COMMUNITY
End: 2021-03-22

## 2020-11-10 RX ADMIN — OXYCODONE HYDROCHLORIDE 5 MG: 5 TABLET ORAL at 14:53

## 2020-11-10 RX ADMIN — OXYCODONE HYDROCHLORIDE 5 MG: 5 TABLET ORAL at 10:08

## 2020-11-10 RX ADMIN — FERROUS SULFATE TAB 325 MG (65 MG ELEMENTAL FE) 325 MG: 325 (65 FE) TAB at 07:55

## 2020-11-10 RX ADMIN — ROPINIROLE HYDROCHLORIDE 0.25 MG: 0.25 TABLET, FILM COATED ORAL at 18:48

## 2020-11-10 RX ADMIN — PROPRANOLOL HYDROCHLORIDE 60 MG: 60 CAPSULE, EXTENDED RELEASE ORAL at 07:55

## 2020-11-10 RX ADMIN — ACETAMINOPHEN 975 MG: 325 TABLET, FILM COATED ORAL at 10:08

## 2020-11-10 RX ADMIN — ROPINIROLE HYDROCHLORIDE 0.5 MG: 0.25 TABLET, FILM COATED ORAL at 21:39

## 2020-11-10 RX ADMIN — DOCUSATE SODIUM AND SENNOSIDES 1 TABLET: 8.6; 5 TABLET ORAL at 07:55

## 2020-11-10 RX ADMIN — METHIMAZOLE 5 MG: 5 TABLET ORAL at 07:56

## 2020-11-10 RX ADMIN — DOCUSATE SODIUM 100 MG: 100 CAPSULE, LIQUID FILLED ORAL at 19:55

## 2020-11-10 RX ADMIN — CEFAZOLIN 1 G: 1 INJECTION, POWDER, FOR SOLUTION INTRAMUSCULAR; INTRAVENOUS at 05:58

## 2020-11-10 RX ADMIN — OXYCODONE HYDROCHLORIDE 5 MG: 5 TABLET ORAL at 04:33

## 2020-11-10 RX ADMIN — CYCLOSPORINE 1 DROP: 0.5 EMULSION OPHTHALMIC at 19:55

## 2020-11-10 RX ADMIN — ACETAMINOPHEN 975 MG: 325 TABLET, FILM COATED ORAL at 04:34

## 2020-11-10 RX ADMIN — SODIUM CHLORIDE 1000 ML: 9 INJECTION, SOLUTION INTRAVENOUS at 11:45

## 2020-11-10 RX ADMIN — SERTRALINE HYDROCHLORIDE 100 MG: 50 TABLET ORAL at 07:55

## 2020-11-10 RX ADMIN — CYCLOSPORINE 1 DROP: 0.5 EMULSION OPHTHALMIC at 07:54

## 2020-11-10 RX ADMIN — ACETAMINOPHEN 975 MG: 325 TABLET, FILM COATED ORAL at 18:48

## 2020-11-10 RX ADMIN — HYDROXYZINE HYDROCHLORIDE 10 MG: 10 TABLET, FILM COATED ORAL at 11:41

## 2020-11-10 RX ADMIN — OMEPRAZOLE 20 MG: 20 CAPSULE, DELAYED RELEASE ORAL at 07:56

## 2020-11-10 RX ADMIN — ATORVASTATIN CALCIUM 10 MG: 10 TABLET, FILM COATED ORAL at 21:39

## 2020-11-10 RX ADMIN — ASPIRIN 162 MG: 81 TABLET ORAL at 07:55

## 2020-11-10 RX ADMIN — DOCUSATE SODIUM AND SENNOSIDES 1 TABLET: 8.6; 5 TABLET ORAL at 19:55

## 2020-11-10 ASSESSMENT — ACTIVITIES OF DAILY LIVING (ADL): PREVIOUS_RESPONSIBILITIES: HOUSEKEEPING;MEAL PREP;LAUNDRY

## 2020-11-10 NOTE — PROGRESS NOTES
11/10/20 0832   Quick Adds   Type of Visit Initial PT Evaluation       Present no   Language English   Living Environment   People in home child(carisa), adult   Current Living Arrangements house   Home Accessibility no concerns   Number of Stairs, Within Home, Primary none   Transportation Anticipated family or friend will provide   Living Environment Comments Pt will  be staying with adult children following surgery   Self-Care   Usual Activity Tolerance moderate   Current Activity Tolerance moderate   Equipment Currently Used at Home other (see comments)  (4WW)   Disability/Function   Hearing Difficulty or Deaf no   Wear Glasses or Blind yes   Vision Management glasses   Concentrating, Remembering or Making Decisions Difficulty no   Difficulty Communicating no   Difficulty Eating/Swallowing no   Walking or Climbing Stairs Difficulty no   Dressing/Bathing Difficulty no   Toileting no   Doing Errands Independently Difficulty (such as shopping) no   Fall history within last six months no   General Information   Onset of Illness/Injury or Date of Surgery 11/09/20   Referring Physician Brian Andino PA-C   Patient/Family Therapy Goals Statement (PT) to be able to walk around   Pertinent History of Current Problem (include personal factors and/or comorbidities that impact the POC) s/p R TKA   Existing Precautions/Restrictions no known precautions/restrictions   Weight-Bearing Status - LUE full weight-bearing   Weight-Bearing Status - RUE full weight-bearing   Weight-Bearing Status - LLE weight-bearing as tolerated;full weight-bearing   Weight-Bearing Status - RLE weight-bearing as tolerated   Cognition   Orientation Status (Cognition) oriented x 4   Affect/Mental Status (Cognition) WNL   Follows Commands (Cognition) WNL   Pain Assessment   Patient Currently in Pain Yes, see Vital Sign flowsheet   Integumentary/Edema   Integumentary/Edema no deficits were identifed   Integumentary/Edema  Comments Surgical site not visualized at this time.   Posture    Posture Protracted shoulders;Forward head position   Range of Motion (ROM)   ROM Quick Adds ROM deficits secondary to pain;ROM deficits secondary to surgical procedure   ROM Comment Not formally assessed aside from R knee, WNL for functional mobility overall.   Right Knee Extension/Flexion ROM   Right Knee Extension/Flexion AROM - degrees 0-13-55   Strength   Manual Muscle Testing Quick Adds No deficits observed during functional mobility   Strength Comments Not formally assessed; WNL for functional mobility   Bed Mobility   Comment (Bed Mobility) Pt transfers supine <> sitting EOB with SBA   Transfers   Transfer Safety Comments Transfers sit <> stand to FWW with SBA   Gait/Stairs (Locomotion)   Distance in Feet (Required for LE Total Joints) 75'   Comment (Gait/Stairs) Ambulated into hallway with FWW and SBA for 75'   Balance   Balance Comments No deficits identified with sitting balance; standing balance has appropriate accomodations with FWW   Clinical Impression   Criteria for Skilled Therapeutic Intervention yes, treatment indicated   PT Diagnosis (PT) Impaired functional mobility   Influenced by the following impairments s/p R TKA   Functional limitations due to impairments bed mob, transfer, ambulation   Clinical Presentation Stable/Uncomplicated   Clinical Presentation Rationale Pt mobilizing well, no co-morbidities affecting POC   Clinical Decision Making (Complexity) low complexity   Therapy Frequency (PT) 2x/day   Predicted Duration of Therapy Intervention (days/wks) 2   Planned Therapy Interventions (PT) balance training;bed mobility training;gait training;home exercise program;strengthening;transfer training   Risk & Benefits of therapy have been explained evaluation/treatment results reviewed;care plan/treatment goals reviewed   PT Discharge Planning    PT Discharge Recommendation (DC Rec) home with assist;home with home care physical  therapy   PT Rationale for DC Rec Pt mobilzing well, staying with family who can assist as needed. Expresses desire to have home PT to progress TKA protocol and return to PLOF   PT Brief overview of current status  SBA for bed mob, sit <> stand, ambulation with FWW   Total Evaluation Time   Total Evaluation Time (Minutes) 10

## 2020-11-10 NOTE — PLAN OF CARE
Physical Therapy Discharge Summary    Reason for therapy discharge:    Discharged to home with home therapy.    Progress towards therapy goal(s). See goals on Care Plan in [facility] electronic health record for goal details.  Goals met    Therapy recommendation(s):    Continued therapy is recommended.  Rationale/Recommendations:  Pt would benefit from continued therapy to progress TKA protocol.

## 2020-11-10 NOTE — PROGRESS NOTES
Care Management Discharge Note    Discharge Date: 11/11/20       Discharge Disposition: Discharge to sons home  Discharge Services: Physical Therapy  Discharge DME: No  Discharge Transportation: daughter in law Day  Private pay costs discussed: N/A    Patient/family educated on Medicare website which has current facility and service quality ratings:      Education Provided on the Discharge Plan: yes  Persons Notified of Discharge Plans: patient and daughter in law  Patient/Family in Agreement with the Plan: Yes  Handoff Referral Completed: No maryanne be done at discharge    Additional Information:  This RNCC spoke with patient to discuss discharge planning, patient doesn't appear to have any complex needs during admission or at discharge and is progressing with mobility. Plan is for patient to discharge to her sons home and would benefit from further skilled home physical therapy for strengthening and increase independence with all functional mobility/gait and follow a well as occupational therapy for IADL s and follow up RN visit. Daughter in Law will provide transport home.     Patient agreeable for home care referral to be sent to [facility].        Gerri ENCARNACIONN RN CCM  RN Care Coordinator [address] [place] [id number]  [email or web address]   Office: [phone]   Pager: [phone]    To contact Weekend RNCC, dial [phone] and enter job code [phone] at prompt. This pager can not be contacted by text page or outside line.

## 2020-11-10 NOTE — PLAN OF CARE
Patient vital signs are at baseline: Yes  Patient able to ambulate as they were prior to admission or with assist devices provided by therapies during their stay:  Yes  Patient MUST void prior to discharge:  Yes  Patient able to tolerate oral intake:  Yes  Pain has adequate pain control using Oral analgesics:  Yes  Patient A&O x4, lungs sound clear, Bowel sound active- passing gas and had a bowel movement 11/9/20, Denied CP, lightheadedness, dizziness, numbness, tingling and SOB, iv fluid infusing, drinking well and voiding spontaneously without difficulties, able to wiggle toes, CMS intact, pain tolerable and taking tylenol and oxycodone for pain, ice pack applied to knee,  incentive spirometer encouraged and done several times, up with assist of one, heels elevated off bed, demonstrates the ability to use call light appropriately, will continue to monitor patient.

## 2020-11-10 NOTE — UTILIZATION REVIEW
Admission Status; Secondary Review Determination   11/9/2020 10:32 AM    Under the authority of the Utilization Management Committee, the utilization review process indicated a secondary review on the above patient. The review outcome is based on review of the medical records, discussions with staff, and applying clinical experience noted on the date of the review.     (x) Outpatient Status Appropriate - This patient does not meet hospital inpatient criteria. If this patient's primary payer is Medicare and was admitted as an inpatient, Condition Code 44 should be used and patient status changed to outpatient recovery.    RATIONALE FOR DETERMINATION   87 years old who underwent a Rt Total Knee arthroplasty  . Patient was admitted to the hospital after the procedure. Patient has Medicare and the procedure is not on the CMS inpatient list. Patient can be safely  monitored for bleeding and recover in outpatient/extended recovery setting. Increase in creatinine after procedure and was given IV fluids with improvement.  Hemoglobin decreased to 10.3.  Will monitor levels overnight but anticipate discharge tomorrow.  The severity of illness, intensity of service provided, expected LOS and risk for adverse outcome doesn't meet inpatient hospital admission.       The information on this document is developed by the utilization review team in order for the business office to ensure compliance. This only denotes the appropriateness of proper admission status and does not reflect the quality of care rendered.   The definitions of Inpatient Status and Observation Status used in making the determination above are those provided in the CMS Coverage Manual, Chapter 1 and Chapter 6, section 70.4.   Sincerely,     Yvonne Chiu MD  Physician Advisor  [facility]

## 2020-11-10 NOTE — PROGRESS NOTES
[facility]     Medicine Progress Note - Hospitalist Service       Date of Admission:  11/9/2020  Assessment & Plan       Dimple Oviedo is a 87 year old female admitted on 11/9/2020. She has a PMH of  hypertension, dyslipidemia, CHF, h/o a fib, RLS, GERD, anxiety, urothelial carcinoma, polymyalgia rheumatica .   She underwent Rt Total Knee arthroplasty today. Internal medicine consulted for postoperative co management  Individual problems and their management are outlined below        S/P Rt Knee Arthroplasty, 11/9    Pain management , DVT prophylaxis, Activity , Wound cares, Dressing changes, Ana-operative antibiotics per primary orthopedic service .  We will monitor for acute blood loss anemia. Pre op Hgb at  13.0    Acute blood loss Anemia   Hb 10.3-13    CRESENCIO:  Hold ace  Hold lasix  IVF one liter   Recheck bmp at 3 pm         HTN:  Resume home dose of inderal 60 mg every morning  Irbesartan 300 mg can be resumed on discharge      CAD  Dyslipidemia:  Takes aspirin 81 mg daily, replaced by 162 mg for DVT prophylaxis  Wilver says she may have had an MI and A fib in the past. She chose not to take DOAC or warfarin.  Currently has a regular rhythm. Most recent EKG in sinus rythm  Resume home dose of lovastatin 40 mg daily     CHF, Mild aortic stenosis   Lymphedema   Most recent Echo in September 2020 with Global and regional left ventricular function is normal with an EF of 60-65%.Grade II or moderate diastolic dysfunction.  Mild aortic stenosis noted    Resume Lasix 20 mg on discharge if BP improves  Bilateral lymphedema R>L      RLS   resume Requip 0.50 mg atnoon and 0.25 mg at bed time         Depression  Anxiety  Fibromyalgia  Hyperthyroidism  Resume home dose of Sertraline  Resume Methimazole 5 mg         GERD:   Resume 20 mg prilosec every morning           Diet: Advance Diet as Tolerated: Regular Diet Adult  Regular Diet Adult    DVT Prophylaxis: Defer to primary  service  Diehl Catheter: in place, indication:    Code Status: Full Code                The patient's care was discussed with the Bedside Nurse, Care Coordinator/, Patient, Patient's Family and Primary team.    Lalita Saravia MD  Hospitalist Service  [facility]   Contact information available via [facility] Paging/Directory    ______________________________________________________________________    Interval History   No new symptoms reported per nursing staff .  Last night notes reviewed .  No chest pain or Shortness of breath reported.  No vomiting   No difficulty with voiding   Passing gas .    4 system ROS reviewed .    Data reviewed today: I reviewed all medications, new labs and imaging results over the last 24 hours.   Physical Exam   Vital Signs: Temp: 98.4  F (36.9  C) Temp src: Oral BP: 124/50 Pulse: 55   Resp: 16 SpO2: 90 % O2 Device: None (Room air) Oxygen Delivery: 6 LPM  Weight: 169 lbs 5.01 oz  Constitutional: awake, alert, cooperative, no apparent distress, and appears stated age  Hematologic / Lymphatic: no cervical lymphadenopathy  Respiratory: No increased work of breathing, good air exchange, clear to auscultation bilaterally, no crackles or wheezing  Cardiovascular: Normal apical impulse, regular rate and rhythm, normal S1 and S2, no S3 or S4, and no murmur noted  GI: No scars, normal bowel sounds, soft, non-distended, non-tender, no masses palpated, no hepatosplenomegally    Data   Recent Labs   Lab 11/10/20  0654 11/09/20  1132   HGB 10.3*  --      --    POTASSIUM 4.2 4.1   CHLORIDE 101  --    CO2 28  --    BUN 21  --    CR 1.25* 0.94   ANIONGAP 4  --    SHREYA 8.1*  --    GLC 99 92

## 2020-11-10 NOTE — PLAN OF CARE
Patient vital signs are at baseline: Yes  Patient able to ambulate as they were prior to admission or with assist devices provided by therapies during their stay:  No,  Reason:Has not ambulated yet.  Patient MUST void prior to discharge:  No,  Reason:  Due to void, no urge at this time.   Patient able to tolerate oral intake:  Yes  Pain has adequate pain control using Oral analgesics:  Yes, just gave first dose of oral medications. Adequate control at this time.     Pt. admitted from PACU at 1730. Pt. accompanied by transport and arrived with personal belongings. Report was taken from ATUL Acosta in PACU. Pt. is A&Ox4. CAPNO is on. VSS. 02 sats= 2% on RA. Lung sounds= clear bilaterally with both anterior and posterior. Bowel sounds are active in all 4 quadrants. CMS and Neuro's are intact. Denies numbness and tingling in all extremities. Has pain in the right knee and given prn Oxycodone, scheduled Tylenol, ICE applied, and is well controlled. Pt. denied nausea, CP, SOB, lightheadedness, and dizziness. Is on a regular and appetite was good. Right knee incisional dressing is C/D/I. No CPM. Pt due to void. PIV is patent and infusing in the left arm. PCD's are in place to BLE's. Pt. is oriented to the room and call light system and the call light is within reach. Continue to monitor.

## 2020-11-10 NOTE — PROGRESS NOTES
1 liter of Normal sodium bolus given as per order, result from repeat creatinine is 1.08. Patient is not going to discharge today as per Dr. Saravia. Continue to take Oxycodone and tylenol for pain. Will continue to monitor patient.

## 2020-11-10 NOTE — PLAN OF CARE
Patient A/Ox4. VSS ex BP slightly elevated. Denies CP, SOB, dizziness/LH. LSCTA. +fl/BS. Voiding well in bathroom with low PVR. CMS intact. Dressing to knee CDI, ice applied. Tolerating regular diet without NV. IS encouraged. Activity level is good, pt ambulated with SBA to bathroom x2. IV SL, will run antibiotics again later. Pain rated as tolerable throughout shift, managed with tylenol and oxycodone - pt did sleep for quite a while and would like to be asked Q3H or so if she needs more pain medications. Doing well otherwise, possible discharge today. Patient has demonstrated ability to call appropriately. Patient is resting with call light within reach. Will continue to monitor.

## 2020-11-10 NOTE — PHARMACY-ADMISSION MEDICATION HISTORY
Admission medication history interview status for the 11/9/2020 admission is complete. See Epic admission navigator for allergy information, pharmacy, prior to admission medications and immunization status.     Medication history interview sources:  Patient interview, [facility] mail-order pharmacy    Changes made to PTA medication list (reason)  Added: Melatonin  Deleted: MVI, nitroglycerin PRN  Changed: Ropinirole (0.25 mg every afternoon, 0.5 mg HS --> 0.5 mg every afternoon, 0.25 mg HS)    Additional medication history information (including reliability of information, actions taken by pharmacist):  -Patient typically takes her alendronate on Mondays, but did not take it yesterday (Monday 11/9) prior to surgery. Advised patient it was OK to take today or Wednesday (11/11) to make up the dose.    Prior to Admission medications    Medication Sig Last Dose Taking? Auth Provider   acetaminophen (TYLENOL) 325 MG tablet Take 3 tablets (975 mg) by mouth every 8 hours as needed for pain  Yes Brian Andino PA-C   alendronate (FOSAMAX) 70 MG tablet TAKE 1 TABLET EVERY 7 DAYS AT LEAST 60 MINUTES BEFORE BREAKFAST AS DIRECTED. 11/2/2020 Yes Evon Arroyo APRN CNP   aspirin (ASA) 81 MG chewable tablet Take 81 mg by mouth daily 11/2/2020 Yes Unknown, Entered By History   aspirin (ASA) 81 MG EC tablet Take 2 tablets (162 mg) by mouth daily for 28 days  Yes Brian Andino PA-C   calcium carbonate-vitamin D (OS-SHREYA) 500-400 MG-UNIT tablet Take 1 tablet by mouth daily 11/8/2020 Yes Unknown, Entered By History   cycloSPORINE (RESTASIS) 0.05 % ophthalmic emulsion Place 1 drop into both eyes 2 times daily 11/9/2020 at 0500 Yes Unknown, Entered By History   ferrous sulfate 325 (65 Fe) MG TBEC EC tablet Take 325 mg by mouth every morning  11/8/2020 at 0800 Yes Unknown, Entered By History   hydrOXYzine (ATARAX) 10 MG tablet Take 1 tablet (10 mg) by mouth every 6 hours as needed for itching or other (adjuvant pain)  Yes  Brian Andino PA-C   irbesartan (AVAPRO) 300 MG tablet TAKE 1 TABLET EVERY DAY 11/8/2020 at 0800 Yes Pop Zapien MD   lovastatin (MEVACOR) 40 MG tablet Take 1 tablet (40 mg) by mouth At Bedtime 11/8/2020 at 2000 Yes Pop Zapien MD   melatonin 5 MG tablet Take 5 mg by mouth At Bedtime 11/8/2020 at 2000 Yes Unknown, Entered By History   methimazole (TAPAZOLE) 5 MG tablet Take 1 tablet (5 mg) by mouth daily 11/9/2020 at 0500 Yes Dhara Meza MD   Omega-3 Fatty Acids (FISH OIL) 500 MG CAPS Take 1 capsule by mouth every morning  11/2/2020 Yes Unknown, Entered By History   omeprazole (PRILOSEC) 20 MG DR capsule TAKE 1 CAPSULE EVERY DAY 11/9/2020 at 0500 Yes Pop Zapien MD   ondansetron (ZOFRAN-ODT) 4 MG ODT tab Take 1 tablet (4 mg) by mouth every 6 hours as needed for nausea or vomiting  Yes Brian Andino PA-C   oxyCODONE (ROXICODONE) 5 MG tablet Take 1 tablet (5 mg) by mouth every 4 hours as needed for moderate to severe pain  Yes Brian Andino PA-C   polyethylene glycol (MIRALAX) 17 g packet Take 17 g by mouth daily  Yes Brian Andino PA-C   Probiotic Product (PROBIOTIC ADVANCED PO) Take 1 capsule by mouth every morning  11/2/2020 Yes Reported, Patient   propranolol ER (INDERAL LA) 60 MG 24 hr capsule TAKE 1 CAPSULE EVERY MORNING 11/9/2020 at 0500 Yes Pop Zapien MD   rOPINIRole (REQUIP) 0.25 MG tablet Take 2 tablets (0.5 mg) by mouth every afternoon at 4 PM, and take 1 tablet (0.25 mg) by mouth at bedtime daily. 11/8/2020 at 2000 Yes Unknown, Entered By History   senna-docusate (SENOKOT-S/PERICOLACE) 8.6-50 MG tablet Take 1-2 tablets by mouth 2 times daily as needed for constipation  Yes Brian Andino PA-C   sertraline (ZOLOFT) 100 MG tablet TAKE 1 TABLET (100 MG) BY MOUTH EVERY MORNING 11/9/2020 at 0500 Yes Pop Zapien MD   traZODone (DESYREL) 50 MG tablet TAKE 1/2 TABLET AT BEDTIME 11/8/2020 at 2000 Yes Pop Zapien  MD Antonino   furosemide (LASIX) 20 MG tablet TAKE 1 TABLET (20 MG) BY MOUTH EVERY MORNING 11/8/2020 at 0800  Pop Zapien MD     Medication history completed by:   Josseline Low, GeeD, [facility]

## 2020-11-10 NOTE — PROGRESS NOTES
Orthopaedic Surgery Progress Note 11/10/2020    E: No acute events overnight.    S: Pain well controlled. Up to the bathroom with walker.  Denies fever/chills/CP/SOB.  Denies numbness or tingling in her foot, but reports some numbness and swelling in her right hand. Plan of care reviewed and questions answered.    O:  Temp: 98.1  F (36.7  C) Temp src: Oral BP: (!) 151/55 Pulse: 55   Resp: 14 SpO2: 90 % O2 Device: None (Room air) Oxygen Delivery: 6 LPM    Exam:  Gen: No acute distress, resting comfortably in bed.  Resp: Non-labored breathing    MSK:     RUE: Ecchymosis and mild swelling to the right hand.  Gross sensation is intact in the median/radial/ulnar distributions, firing EPL/FPL/intrinsics.  Radial pulse palpable  Focused examination of the Right LE shows:  Inspection: Dressing C/D/I  Motor:  Tibialis anterior, gastroc/soleus, EHL strength 5/5   Sensory: SILT to superficial peroneal, deep peroneal, saphenous, sural, and tibial nerve territories  Circulation: palpable DP, foot warm and well perfused      No lab results found in last 7 days.    Invalid input(s): SEDRATE    Assessment: Dimple Oviedo is a 87 year old female s/p right TKA on 11/9 with Dr. Otero. Doing well.    - PT/OT  - Medical clearance for discharge  - Patient requesting home health PT, appreciate coordinating if able.    Plan:  Ortho Primary  Activity: Up with assist.  Weight bearing status: WBAT.  Antibiotics: Ancef x 24 hours.  Diet: Begin with clear fluids and progress diet as tolerated.  DVT prophylaxis:  mg x 4 weeks, SCDs in the hospital.  Bracing/Splinting: None.  Wound Care: Tegaderm x 7 days.  Drains: None.  Pain management: transition from IV to orals as tolerated.   X-rays: AP knee XR in PACU.  Physical Therapy: ROM, ADL's.  Occupational Therapy: ADL's.  Labs: Trend Hgb on POD #1.  Consults: Hospitalist, PT, OT - appreciate assistance in caring for this patient.  Follow-up: Clinic with Dr. Otero's team in 2 weeks.    Disposition: Pending progress with therapies, pain control on orals, and medical stability, anticipate discharge to home on POD #1.       Froilan Villegas MD 11/10/2020  Orthopaedic Surgery Resident, PGY-4  Pager: [phone]    For questions about this patient, please attempt to contact me at my pager prior to contacting the orthopaedics resident on call. Thank you!

## 2020-11-10 NOTE — PLAN OF CARE
Occupational Therapy Discharge Summary    Reason for therapy discharge:    All goals and outcomes met, no further needs identified.    Progress towards therapy goal(s). See goals on Care Plan in [facility] electronic health record for goal details.  Adequate for discharge.  SBA for ambulation to bathroom, SBA for toilet transfer with grab bars, min A for tub transfer with A from family.     Therapy recommendation(s):    No further therapy is recommended.

## 2020-11-10 NOTE — PROGRESS NOTES
11/10/20 0939   Quick Adds   Type of Visit Initial Occupational Therapy Evaluation       Present no   Living Environment   People in home child(carisa), adult   Current Living Arrangements house   Home Accessibility no concerns   Number of Stairs, Within Home, Primary none   Transportation Anticipated family or friend will provide   Living Environment Comments pt planning to stay with adult children at d/c tub shower with shower chair. has comfort height toilets, has BSC for over toilet.    Self-Care   Usual Activity Tolerance moderate   Current Activity Tolerance moderate   Equipment Currently Used at Home walker, rolling   Disability/Function   Hearing Difficulty or Deaf no   Wear Glasses or Blind yes   Vision Management glasses   Concentrating, Remembering or Making Decisions Difficulty no   Difficulty Communicating no   Difficulty Eating/Swallowing no   Walking or Climbing Stairs Difficulty no   Dressing/Bathing Difficulty no   Toileting no   Doing Errands Independently Difficulty (such as shopping) no   Fall history within last six months no   Change in Functional Status Since Onset of Current Illness/Injury yes   General Information   Onset of Illness/Injury or Date of Surgery 11/09/20   Referring Physician Branden   Patient/Family Therapy Goal Statement (OT) return to family house    Additional Occupational Profile Info/Pertinent History of Current Problem R TKA    Existing Precautions/Restrictions fall   Left Lower Extremity (Weight-bearing Status) weight-bearing as tolerated (WBAT)   Cognitive Status Examination   Orientation Status orientation to person, place and time   Affect/Mental Status (Cognitive) WNL   Follows Commands WNL   Visual Perception   Visual Impairment/Limitations WNL   Sensory   Sensory Quick Adds No deficits were identified   Pain Assessment   Patient Currently in Pain Yes, see Vital Sign flowsheet   Integumentary/Edema   Integumentary/Edema no deficits were identifed    Posture   Posture not impaired   Range of Motion Comprehensive   General Range of Motion no range of motion deficits identified   Strength Comprehensive (MMT)   General Manual Muscle Testing (MMT) Assessment no strength deficits identified   Muscle Tone Assessment   Muscle Tone Quick Adds No deficits were identified   Coordination   Upper Extremity Coordination No deficits were identified   Instrumental Activities of Daily Living (IADL)   Previous Responsibilities housekeeping;meal prep;laundry   IADL Comments family to A at d/c    Clinical Impression   Criteria for Skilled Therapeutic Interventions Met (OT) yes   OT Diagnosis dec IND with ADL's and functional transfers   OT Problem List-Impairments impacting ADL pain   Assessment of Occupational Performance 1-3 Performance Deficits   Identified Performance Deficits Le dressing, tub transfer   Planned Therapy Interventions (OT) ADL retraining;transfer training   Clinical Decision Making Complexity (OT) low complexity   Therapy Frequency (OT) 1x eval and treat   Predicted Duration of Therapy 1 tx only   Risks and Benefits of Treatment have been explained. Yes   Patient, Family & other staff in agreement with plan of care Yes   OT Discharge Planning    OT Discharge Recommendation (DC Rec) Home with assist   OT Rationale for DC Rec OT goals adequate for d/c. Pt has supportive family she is staying with at d/c. Pt has shower chair and raised toilet seat for AE. No further OT needs.    Total Evaluation Time (Minutes)   Total Evaluation Time (Minutes) 8

## 2020-11-11 ENCOUNTER — HOME CARE/HOSPICE - HEALTHEAST (OUTPATIENT)
Dept: HOME HEALTH SERVICES | Facility: HOME HEALTH | Age: 85
End: 2020-11-11

## 2020-11-11 ENCOUNTER — RECORDS - HEALTHEAST (OUTPATIENT)
Dept: ADMINISTRATIVE | Facility: OTHER | Age: 85
End: 2020-11-11

## 2020-11-11 VITALS
HEIGHT: 57 IN | WEIGHT: 169.31 LBS | OXYGEN SATURATION: 98 % | DIASTOLIC BLOOD PRESSURE: 41 MMHG | SYSTOLIC BLOOD PRESSURE: 130 MMHG | TEMPERATURE: 97.7 F | HEART RATE: 60 BPM | RESPIRATION RATE: 18 BRPM | BODY MASS INDEX: 36.53 KG/M2

## 2020-11-11 LAB
ANION GAP SERPL CALCULATED.3IONS-SCNC: 4 MMOL/L (ref 3–14)
BUN SERPL-MCNC: 16 MG/DL (ref 7–30)
CALCIUM SERPL-MCNC: 7.7 MG/DL (ref 8.5–10.1)
CHLORIDE SERPL-SCNC: 105 MMOL/L (ref 94–109)
CO2 SERPL-SCNC: 25 MMOL/L (ref 20–32)
CREAT SERPL-MCNC: 1.06 MG/DL (ref 0.52–1.04)
GFR SERPL CREATININE-BSD FRML MDRD: 47 ML/MIN/{1.73_M2}
GLUCOSE SERPL-MCNC: 89 MG/DL (ref 70–99)
HGB BLD-MCNC: 9.9 G/DL (ref 11.7–15.7)
POTASSIUM SERPL-SCNC: 4.2 MMOL/L (ref 3.4–5.3)
SODIUM SERPL-SCNC: 134 MMOL/L (ref 133–144)

## 2020-11-11 PROCEDURE — 99214 OFFICE O/P EST MOD 30 MIN: CPT | Performed by: INTERNAL MEDICINE

## 2020-11-11 PROCEDURE — 80048 BASIC METABOLIC PNL TOTAL CA: CPT | Performed by: PHYSICIAN ASSISTANT

## 2020-11-11 PROCEDURE — 99207 PR CDG-CODE CATEGORY CHANGED: CPT | Performed by: INTERNAL MEDICINE

## 2020-11-11 PROCEDURE — 250N000013 HC RX MED GY IP 250 OP 250 PS 637: Performed by: PHYSICIAN ASSISTANT

## 2020-11-11 PROCEDURE — 36415 COLL VENOUS BLD VENIPUNCTURE: CPT | Performed by: PHYSICIAN ASSISTANT

## 2020-11-11 PROCEDURE — 250N000013 HC RX MED GY IP 250 OP 250 PS 637: Mod: GY | Performed by: INTERNAL MEDICINE

## 2020-11-11 PROCEDURE — 85018 HEMOGLOBIN: CPT | Performed by: PHYSICIAN ASSISTANT

## 2020-11-11 RX ADMIN — DOCUSATE SODIUM AND SENNOSIDES 1 TABLET: 8.6; 5 TABLET ORAL at 08:47

## 2020-11-11 RX ADMIN — METHIMAZOLE 5 MG: 5 TABLET ORAL at 08:47

## 2020-11-11 RX ADMIN — ACETAMINOPHEN 975 MG: 325 TABLET, FILM COATED ORAL at 00:23

## 2020-11-11 RX ADMIN — CYCLOSPORINE 1 DROP: 0.5 EMULSION OPHTHALMIC at 09:02

## 2020-11-11 RX ADMIN — SERTRALINE HYDROCHLORIDE 100 MG: 50 TABLET ORAL at 08:47

## 2020-11-11 RX ADMIN — ASPIRIN 162 MG: 81 TABLET ORAL at 08:48

## 2020-11-11 RX ADMIN — ACETAMINOPHEN 975 MG: 325 TABLET, FILM COATED ORAL at 08:47

## 2020-11-11 RX ADMIN — OXYCODONE HYDROCHLORIDE 5 MG: 5 TABLET ORAL at 00:23

## 2020-11-11 RX ADMIN — FERROUS SULFATE TAB 325 MG (65 MG ELEMENTAL FE) 325 MG: 325 (65 FE) TAB at 08:47

## 2020-11-11 RX ADMIN — PROPRANOLOL HYDROCHLORIDE 60 MG: 60 CAPSULE, EXTENDED RELEASE ORAL at 09:03

## 2020-11-11 RX ADMIN — OMEPRAZOLE 20 MG: 20 CAPSULE, DELAYED RELEASE ORAL at 08:47

## 2020-11-11 RX ADMIN — OXYCODONE HYDROCHLORIDE 5 MG: 5 TABLET ORAL at 01:00

## 2020-11-11 RX ADMIN — OXYCODONE HYDROCHLORIDE 10 MG: 10 TABLET ORAL at 07:02

## 2020-11-11 RX ADMIN — DOCUSATE SODIUM 100 MG: 100 CAPSULE, LIQUID FILLED ORAL at 08:47

## 2020-11-11 NOTE — PROGRESS NOTES
[facility]     Medicine Progress Note - Hospitalist Service       Date of Admission:  11/9/2020  Assessment & Plan             Dimple Oviedo is a 87 year old female admitted on 11/9/2020. She has a PMH of  hypertension, dyslipidemia, CHF, h/o a fib, RLS, GERD, anxiety, urothelial carcinoma, polymyalgia rheumatica .   She underwent Rt Total Knee arthroplasty today. Internal medicine consulted for postoperative co management  Individual problems and their management are outlined below        S/P Rt Knee Arthroplasty, 11/9    Pain management , DVT prophylaxis, Activity , Wound cares, Dressing changes, Ana-operative antibiotics per primary orthopedic service .  We will monitor for acute blood loss anemia. Pre op Hgb at  13.0    Acute blood loss Anemia   Does not require transfusion   Hb -9.9 10.3-13    CRESENCIO:  Improved with hydration and holding medications         HTN:  Resume home dose of inderal 60 mg every morning  Irbesartan 300 mg can be resumed on discharge      CAD  Dyslipidemia:  Takes aspirin 81 mg daily, replaced by 162 mg for DVT prophylaxis  Wilver says she may have had an MI and A fib in the past. She chose not to take DOAC or warfarin.  Currently has a regular rhythm. Most recent EKG in sinus rythm   home dose of lovastatin 40 mg daily     CHF, Mild aortic stenosis   Lymphedema   Most recent Echo in September 2020 with Global and regional left ventricular function is normal with an EF of 60-65%.Grade II or moderate diastolic dysfunction.  Mild aortic stenosis noted    Resume Lasix 20 mg on discharge in 2 days  Bilateral lymphedema R>L      RLS  [place]         Depression  Anxiety  Fibromyalgia  Hyperthyroidism   Sertraline   Methimazole 5 mg         GERD:   20 mg prilosec every morning             Diet: Advance Diet as Tolerated: Regular Diet Adult  Regular Diet Adult    DVT Prophylaxis: Defer to primary service  Diehl Catheter: in place, indication:    Code  Status: Full Code           Disposition Plan   Expected discharge: Today, recommended to prior living arrangement once      The patient's care was discussed with the Bedside Nurse, Care Coordinator/ and Patient.    Lalita Saravia MD  Hospitalist Service  [facility]   Contact information available via [facility] Paging/Directory    ______________________________________________________________________    Interval History   Feels well   No new symptoms reported per nursing staff .  Last night notes reviewed .  No chest pain or Shortness of breath reported.  No vomiting   No difficulty with voiding   Passing gas .    4 system ROS reviewed .    Data reviewed today: I reviewed all medications, new labs and imaging results over the last 24 hours. .    Physical Exam   Vital Signs: Temp: 97.7  F (36.5  C) Temp src: Oral BP: 130/41 Pulse: 60   Resp: 18 SpO2: 98 % O2 Device: None (Room air)    Weight: 169 lbs 5.01 oz  Constitutional: awake, alert, cooperative, no apparent distress, and appears stated age  Respiratory: No increased work of breathing, good air exchange, clear to auscultation bilaterally, no crackles or wheezing  Cardiovascular: Normal apical impulse, regular rate and rhythm, normal S1 and S2, shortsystolic murmur  GI: No scars, normal bowel sounds, soft, non-distended, non-tender, no masses palpated, no hepatosplenomegally    Data   Recent Labs   Lab 11/11/20  0617 11/10/20  1536 11/10/20  0654   HGB 9.9*  --  10.3*    130* 133   POTASSIUM 4.2 3.8 4.2   CHLORIDE 105 100 101   CO2 25 23 28   BUN 16 20 21   CR 1.06* 1.08* 1.25*   ANIONGAP 4 7 4   SHREYA 7.7* 7.3* 8.1*   GLC 89 140* 99

## 2020-11-11 NOTE — PLAN OF CARE
VS: VSS ex HTN, improved when pain was better controlled   O2: > 90% on RA. Denies SOB, cough, and chest pain. Using IS. Lungs CTA   Output: Voiding spontaneously   Last BM: 11/9/2020. Passing gas, bowel sounds active   Activity: Up w one assist and walker   Skin: Intact ex incision   Pain: Stabbing pain, improved w PRN oxycodone and scheduled Tylenol. Ice encouraged   CMS: Intact   Dressing: CDI   Diet: Regular   LDA: PIV SL, no drains   Equipment: IV pump, walker   Plan: Possible discharge to home 11/11, planning on discharge to son's home   Additional Info: [place], wears hearing aid

## 2020-11-11 NOTE — PLAN OF CARE
Patient A&O x4, lungs sound clear, Bowel sound active- passing gas, Denied CP, lightheadedness, dizziness, numbness, tingling and SOB, iv removed prior to discharge, drinking well and voiding spontaneously without difficulties, able to wiggle toes,dressing clean,dry and intact, CMS intact, pain tolerable and taking Tylenol and oxycodone for pain, ice pack applied to knee,  incentive spirometer encouraged and done several times, up ambulating and using walker. demonstrates the ability to use call light appropriately.

## 2020-11-11 NOTE — PROGRESS NOTES
Orthopaedic Surgery Progress Note 11/11/2020    E: No acute events overnight.  1L NS bolus yesterday for CRESENCIO, repeat Cr 1.08.    S: Pain well controlled. Working well with PT.  Denies fever/chills/CP/SOB.  Denies numbness or tingling in her foot. Numbness in right hand improved. Plan of care reviewed and questions answered.    O:  Temp: 97.6  F (36.4  C) Temp src: Oral BP: (P) 131/59 Pulse: 60   Resp: 20 SpO2: 98 % O2 Device: None (Room air)      Exam:  Gen: No acute distress, resting comfortably in bed.  Resp: Non-labored breathing    MSK:     RUE: Ecchymosis and mild swelling to the right hand.  Gross sensation is intact in the median/radial/ulnar distributions, firing EPL/FPL/intrinsics.  Radial pulse palpable  Focused examination of the Right LE shows:  Inspection: Dressing C/D/I  Motor:  Tibialis anterior, gastroc/soleus, EHL strength 5/5   Sensory: SILT to superficial peroneal, deep peroneal, saphenous, sural, and tibial nerve territories  Circulation: palpable DP, foot warm and well perfused      Recent Labs   Lab 11/11/20  0617 11/10/20  0654   HGB 9.9* 10.3*       Assessment: Dimple Oviedo is a 87 year old female s/p right TKA on 11/9 with Dr. Otero. Doing well.    - PT/OT  - Med clearance (recheck Cr this AM 1.06).  - Patient requesting home health PT, appreciate coordinating if able.    Plan:  Ortho Primary  Activity: Up with assist.  Weight bearing status: WBAT.  Antibiotics: Ancef x 24 hours.  Diet: Begin with clear fluids and progress diet as tolerated.  DVT prophylaxis:  mg x 4 weeks, SCDs in the hospital.  Bracing/Splinting: None.  Wound Care: Tegaderm x 7 days.  Drains: None.  Pain management: transition from IV to orals as tolerated.   X-rays: AP knee XR in PACU.  Physical Therapy: ROM, ADL's.  Occupational Therapy: ADL's.  Labs: Trend Hgb on POD #1.  Consults: Hospitalist, PT, OT - appreciate assistance in caring for this patient.  Follow-up: Clinic with Dr. Otero's team in 2 weeks.    Disposition: Pending progress with therapies, pain control on orals, and medical stability, anticipate discharge to home with family today.       Froilan Villegas MD 11/11/2020  Orthopaedic Surgery Resident, PGY-4  Pager: [phone]    For questions about this patient, please attempt to contact me at my pager prior to contacting the orthopaedics resident on call. Thank you!

## 2020-11-11 NOTE — PROGRESS NOTES
Patient vital signs are at baseline: Yes  Patient able to ambulate as they were prior to admission or with assist devices provided by therapies during their stay:  Yes  Patient MUST void prior to discharge:  Yes  Patient able to tolerate oral intake:  Yes  Pain has adequate pain control using Oral analgesics:  Yes  Discharge instructions read and explained to patient, all questions answered. Medications  given to patient. Patient discharged to home with all belongings.

## 2020-11-12 NOTE — ANESTHESIA POSTPROCEDURE EVALUATION
Anesthesia POST Procedure Evaluation    Patient: Dimple Oviedo   MRN:     [id number] Gender:   female   Age:    87 year old :      1933        Preoperative Diagnosis: Primary osteoarthritis of right knee [M17.11]   Procedure(s):  ARTHROPLASTY, KNEE, TOTAL, RIGHT   Postop Comments: No value filed.     Anesthesia Type: MAC, Epidural       Disposition: Admission   Postop Pain Control: Uneventful            Sign Out: Well controlled pain   PONV: No   Neuro/Psych: Uneventful            Sign Out: Acceptable/Baseline neuro status   Airway/Respiratory: Uneventful            Sign Out: Acceptable/Baseline resp. status   CV/Hemodynamics: Uneventful            Sign Out: Acceptable CV status   Other NRE: NONE   DID A NON-ROUTINE EVENT OCCUR? No    Event details/Postop Comments:  Doing well. Alert, oriented. No nausea, or problems with pain control.  Patient denies any concerns. Spinal block wearing off appropriately and almost has complete sensation back (moving lower extremities).    2020: Called to follow up on patient. No evidence of headache. No questions or concerns for me, and pleased with care. Understands to contact surgical team if she does have any future concerns.           Last Anesthesia Record Vitals:  CRNA VITALS  2020 1501 - 2020 1601      2020             Pulse:  53    SpO2:  100 %          Last PACU Vitals:  Vitals Value Taken Time   /68 20 1648   Temp 36.6  C (97.9  F) 20 1645   Pulse 59 20 1648   Resp 10 20 1647   SpO2 96 % 20 1700   Temp src     NIBP     Pulse     SpO2     Resp     Temp     Ht Rate     Temp 2     Vitals shown include unvalidated device data.      Electronically Signed By: Gemini Driver MD, 2020, 1:50 PM

## 2020-11-13 ENCOUNTER — HOME CARE/HOSPICE - HEALTHEAST (OUTPATIENT)
Dept: HOME HEALTH SERVICES | Facility: HOME HEALTH | Age: 85
End: 2020-11-13

## 2020-11-15 ENCOUNTER — TELEPHONE (OUTPATIENT)
Dept: FAMILY MEDICINE | Facility: CLINIC | Age: 85
End: 2020-11-15

## 2020-11-15 NOTE — TELEPHONE ENCOUNTER
Requesting verbal orders for ongoing home PT visits 2w3 for R TKA. Please respond to this telephone encounter. Thank you.

## 2020-11-17 ENCOUNTER — HOME CARE/HOSPICE - HEALTHEAST (OUTPATIENT)
Dept: HOME HEALTH SERVICES | Facility: HOME HEALTH | Age: 85
End: 2020-11-17

## 2020-11-18 ENCOUNTER — HOME CARE/HOSPICE - HEALTHEAST (OUTPATIENT)
Dept: HOME HEALTH SERVICES | Facility: HOME HEALTH | Age: 85
End: 2020-11-18

## 2020-11-18 DIAGNOSIS — M17.11 PRIMARY OSTEOARTHRITIS OF RIGHT KNEE: ICD-10-CM

## 2020-11-18 RX ORDER — HYDROXYZINE HYDROCHLORIDE 10 MG/1
10 TABLET, FILM COATED ORAL EVERY 6 HOURS PRN
Qty: 10 TABLET | Refills: 0 | Status: SHIPPED | OUTPATIENT
Start: 2020-11-18 | End: 2021-10-27

## 2020-11-18 RX ORDER — OXYCODONE HYDROCHLORIDE 5 MG/1
5 TABLET ORAL EVERY 4 HOURS PRN
Qty: 20 TABLET | Refills: 0 | Status: SHIPPED | OUTPATIENT
Start: 2020-11-18 | End: 2021-10-27

## 2020-11-18 NOTE — TELEPHONE ENCOUNTER
DOS: 11/9/20 Right total knee arthroplasty    Patient is requesting refill of her postop pain medications. She is taking 1 tablet every 4 hours and using hydroxyzine 3 times daily. Will send to patient's pharmacy per standing protocol, [facility] on [address] in [place].

## 2020-11-19 ENCOUNTER — HOME CARE/HOSPICE - HEALTHEAST (OUTPATIENT)
Dept: HOME HEALTH SERVICES | Facility: HOME HEALTH | Age: 85
End: 2020-11-19

## 2020-11-20 NOTE — PROGRESS NOTES
[facility]      OUTPATIENT OCCUPATIONAL THERAPY  EVALUATION  PLAN OF TREATMENT FOR OUTPATIENT REHABILITATION  (COMPLETE FOR INITIAL CLAIMS ONLY)  Patient's Last Name, First Name, M.I.  YOB: 1933  Dimple Oviedo                          Provider's Name  [facility] Medical Record No.  [id number]                               Onset Date:  11/09/20   Start of Care Date:  (P) 11/10/20     Type:     ___PT   _X_OT   ___SLP Medical Diagnosis:  (P) R TKA                         OT Diagnosis:  dec IND with ADL's and functional transfers   Visits from SOC:  1   _________________________________________________________________________________  Plan of Treatment/Functional Goals    Planned Interventions: ADL retraining, transfer training   Goals: See Occupational Therapy Goals on Care Plan in [facility] electronic health record.    Therapy Frequency: 1x eval and treat  Predicted Duration of Therapy Intervention: 1 tx only  _________________________________________________________________________________    I CERTIFY THE NEED FOR THESE SERVICES FURNISHED UNDER        THIS PLAN OF TREATMENT AND WHILE UNDER MY CARE     (Physician co-signature of this document indicates review and certification of the therapy plan).                Certification date from: (P) 11/10/20, Certification date to: (P) 11/10/20    Referring Physician: Edward Otero MD            Initial Assessment        See Occupational Therapy evaluation dated (P) 11/10/20 in Epic electronic health record.

## 2020-11-21 DIAGNOSIS — Z11.59 SPECIAL SCREENING EXAMINATION FOR VIRAL DISEASE: ICD-10-CM

## 2020-11-21 PROCEDURE — U0003 INFECTIOUS AGENT DETECTION BY NUCLEIC ACID (DNA OR RNA); SEVERE ACUTE RESPIRATORY SYNDROME CORONAVIRUS 2 (SARS-COV-2) (CORONAVIRUS DISEASE [COVID-19]), AMPLIFIED PROBE TECHNIQUE, MAKING USE OF HIGH THROUGHPUT TECHNOLOGIES AS DESCRIBED BY CMS-2020-01-R: HCPCS | Mod: 90 | Performed by: PATHOLOGY

## 2020-11-21 PROCEDURE — 99000 SPECIMEN HANDLING OFFICE-LAB: CPT | Performed by: PATHOLOGY

## 2020-11-22 DIAGNOSIS — C68.9 UROTHELIAL CANCER (H): Primary | ICD-10-CM

## 2020-11-22 DIAGNOSIS — C66.1 UROTHELIAL CARCINOMA OF RIGHT DISTAL URETER (H): ICD-10-CM

## 2020-11-22 DIAGNOSIS — C68.9 UROTHELIAL CARCINOMA (H): ICD-10-CM

## 2020-11-22 LAB
SARS-COV-2 RNA SPEC QL NAA+PROBE: NOT DETECTED
SPECIMEN SOURCE: NORMAL

## 2020-11-22 RX ORDER — LIDOCAINE HYDROCHLORIDE 20 MG/ML
10 JELLY TOPICAL
Status: CANCELLED | OUTPATIENT
Start: 2020-12-10

## 2020-11-22 RX ORDER — LIDOCAINE HYDROCHLORIDE 20 MG/ML
10 JELLY TOPICAL
Status: CANCELLED | OUTPATIENT
Start: 2020-11-25

## 2020-11-22 RX ORDER — LIDOCAINE HYDROCHLORIDE 20 MG/ML
10 JELLY TOPICAL
Status: CANCELLED | OUTPATIENT
Start: 2020-12-02

## 2020-11-23 ENCOUNTER — HOME CARE/HOSPICE - HEALTHEAST (OUTPATIENT)
Dept: HOME HEALTH SERVICES | Facility: HOME HEALTH | Age: 85
End: 2020-11-23

## 2020-11-23 ENCOUNTER — TELEPHONE (OUTPATIENT)
Dept: ORTHOPEDICS | Facility: CLINIC | Age: 85
End: 2020-11-23

## 2020-11-23 NOTE — TELEPHONE ENCOUNTER
Writer called and left pt a voice mail for pt offering a virtual visit versus in person visit on Friday 11/27/2020. This is due to reduce the risk of exposure to covid 19. Pt is to call the clinic back either way.   Writer also informed pt that if they choose to come in person there is a no visitor restriction in place and pt will be the only one allowed in clinic.       Jada Polo LPN

## 2020-11-25 ENCOUNTER — HOME CARE/HOSPICE - HEALTHEAST (OUTPATIENT)
Dept: HOME HEALTH SERVICES | Facility: HOME HEALTH | Age: 85
End: 2020-11-25

## 2020-11-25 ENCOUNTER — INFUSION THERAPY VISIT (OUTPATIENT)
Dept: ONCOLOGY | Facility: CLINIC | Age: 85
End: 2020-11-25
Attending: UROLOGY
Payer: MEDICARE

## 2020-11-25 VITALS
TEMPERATURE: 97.9 F | SYSTOLIC BLOOD PRESSURE: 147 MMHG | HEART RATE: 60 BPM | RESPIRATION RATE: 16 BRPM | DIASTOLIC BLOOD PRESSURE: 77 MMHG | OXYGEN SATURATION: 96 %

## 2020-11-25 DIAGNOSIS — C66.1 UROTHELIAL CARCINOMA OF RIGHT DISTAL URETER (H): ICD-10-CM

## 2020-11-25 DIAGNOSIS — C68.9 UROTHELIAL CARCINOMA (H): ICD-10-CM

## 2020-11-25 DIAGNOSIS — C68.9 UROTHELIAL CANCER (H): Primary | ICD-10-CM

## 2020-11-25 DIAGNOSIS — C67.8 MALIGNANT NEOPLASM OF OVERLAPPING SITES OF BLADDER (H): Primary | ICD-10-CM

## 2020-11-25 LAB
ALBUMIN UR-MCNC: NEGATIVE MG/DL
APPEARANCE UR: CLEAR
BILIRUB UR QL STRIP: NEGATIVE
COLOR UR AUTO: NORMAL
GLUCOSE UR STRIP-MCNC: NEGATIVE MG/DL
HGB UR QL STRIP: NEGATIVE
KETONES UR STRIP-MCNC: NEGATIVE MG/DL
LEUKOCYTE ESTERASE UR QL STRIP: NEGATIVE
NITRATE UR QL: NEGATIVE
PH UR STRIP: 6 PH (ref 5–7)
SOURCE: NORMAL
SP GR UR STRIP: 1 (ref 1–1.03)
UROBILINOGEN UR STRIP-MCNC: 0 MG/DL (ref 0–2)

## 2020-11-25 PROCEDURE — 99207 PR NO BILLABLE SERVICE THIS VISIT: CPT

## 2020-11-25 PROCEDURE — 51720 TREATMENT OF BLADDER LESION: CPT

## 2020-11-25 PROCEDURE — 81003 URINALYSIS AUTO W/O SCOPE: CPT | Performed by: UROLOGY

## 2020-11-25 PROCEDURE — 250N000011 HC RX IP 250 OP 636: Performed by: UROLOGY

## 2020-11-25 RX ORDER — LEVOFLOXACIN 500 MG/1
TABLET, FILM COATED ORAL
Qty: 6 TABLET | Refills: 0 | Status: SHIPPED | OUTPATIENT
Start: 2020-11-25 | End: 2021-05-14

## 2020-11-25 RX ADMIN — BACILLUS CALMETTE-GUERIN 50 MG: 50 POWDER, FOR SUSPENSION INTRAVESICAL at 13:25

## 2020-11-25 ASSESSMENT — PAIN SCALES - GENERAL: PAINLEVEL: MODERATE PAIN (4)

## 2020-11-25 NOTE — PROGRESS NOTES
Infusion Nursing Note:  Dimple Oviedo presents today for BCG Maintenance.  Instillation number 1 of 3.   Patient seen by provider today: No   present during visit today: Not Applicable.    Note: Patient arrives to infusion feeling well. At baseline, denies any urinary issues. Denies dysuria, increased urgency, increased frequency, hematuria, fever, or chills today. InBasket sent to Kiera Figueroa, urology [facility]CC prior to patient arrival for new Levaquin script. Reinforced teaching on Levaquin regimen. Patient historically stays to dwell and will continue to do so. Son brings patient to and from appointments. Recently had a right knee replacement and is doing pretty well. Pain 4/10 and is taking baby aspirin ATC (every 6-8 hours). Informed patient to call if hematuria lasts for longer then 48-72 hours. Patient verbalized understanding.     No visible blood in patient's urine sample today. Pt reports having an adequate supply of Levaquin for today and next treatment.       Treatment Conditions:   Patient states no concerns or issues at this time.  Ok to proceed with treatment.     Labs Reviewed:  Urine analysis.  UA RESULTS:  Lab Test 11/25/20  1229   COLOR Straw   APPEARANCE Clear   URINEGLC Negative   URINEBILI Negative   URINEKETONE Negative   SG 1.005   UBLD Negative   URINEPH 6.0   PROTEIN Negative   UROBILINOGEN  --    NITRITE Negative   LEUKEST Negative   RBCU  --    WBCU  --        Results meet treatment conditions.     Procedure:  16F Diehl catheter placed.  Catheter placed without trauma.  Clear/yellow urine drained from bladder.    Patient turned every 15 minutes per protocol: Yes, turning completed at clinic per protocol.  Patient tolerated instillation without incident.    Patient instructed on the following and verbalized understanding:   Medication to remain in the bladder for 2 hours post instillation: YES  Post instillation side effects and precautions discussed: YES  Levaquin dose to be  taken 6 hours post instillation and tomorrow morning: YES    Discharge Plan:   Prescription refills given for Levaquin.  Discharge instructions reviewed with: Patient.  Patient and/or family verbalized understanding of discharge instructions and all questions answered.  Copy of AVS reviewed with patient and/or family.  Patient will return 12/2 for next appointment.  Patient discharged in stable condition accompanied by: self.  Departure Mode: Ambulatory with wheeled walker.    Shannen Pulliam RN

## 2020-11-25 NOTE — PATIENT INSTRUCTIONS
Since you are on aspirin right now, please be sure to call our urology triage line if you notice blood in your urine that lasts more than 2-3 days.    Home discharge instructions:  -Wear pad if incontinence is a possibliity  -Wash hands throughly after using the bathroom  -For safety reasons remember to follow these directions for 6 hours after each treatment:                        1. Sit on the toilet seat to urinate                        2. Immediately after urinating- add 2 cups of undiluted chlorine bleach to the toilet                         3. Close lid and let the bleach sit for 15 minutes each time                        4. Drink plenty of water to flush any remaining medication out of your bladder     **These steps will help kill all the BCG bacteria and disinfect the toilet**        Take your antibiotic today at 7:30pm and tomorrow morning.        Please call urology triage line at [phone] or [phone] with fevers or chills, burning with urination, or blood in your urine which lasts more than a 48-72 hours or with any question or concerns.

## 2020-11-27 ENCOUNTER — VIRTUAL VISIT (OUTPATIENT)
Dept: ORTHOPEDICS | Facility: CLINIC | Age: 85
End: 2020-11-27
Payer: MEDICARE

## 2020-11-27 DIAGNOSIS — Z98.890 STATUS POST KNEE SURGERY: Primary | ICD-10-CM

## 2020-11-27 PROCEDURE — 99024 POSTOP FOLLOW-UP VISIT: CPT | Mod: 95 | Performed by: PHYSICIAN ASSISTANT

## 2020-11-27 NOTE — PROGRESS NOTES
"Dimple Oviedo is a 87 year old female who is being evaluated via a billable telephone visit.      The patient has been notified of following:     \"This telephone visit will be conducted via a call between you and your physician/provider. We have found that certain health care needs can be provided without the need for a physical exam.  This service lets us provide the care you need with a short phone conversation.  If a prescription is necessary we can send it directly to your pharmacy.  If lab work is needed we can place an order for that and you can then stop by our lab to have the test done at a later time.    Telephone visits are billed at different rates depending on your insurance coverage. During this emergency period, for some insurers they may be billed the same as an in-person visit.  Please reach out to your insurance provider with any questions.    If during the course of the call the physician/provider feels a telephone visit is not appropriate, you will not be charged for this service.\"    Patient has given verbal consent for Telephone visit?  Yes    What phone number would you like to be contacted at? [phone]    How would you like to obtain your AVS? Paulina    Phone call duration: 18 minutes    REASON FOR VIST/CC: Telephone follow-up appointment following right total knee arthroplasty with Dr. Otero on 11/9/2020. The patient was informed of the following:    \"This telephone visit will be conducted via a call between you and your physician/provider. We have found that certain health care needs can be provided without the need for a physical exam.  This service lets us provide the care you need with a short phone conversation.  If a prescription is necessary we can send it directly to your pharmacy.  If lab work is needed we can place an order for that and you can then stop by our lab to have the test done at a later time.     If during the course of the call the physician/provider feels a " "telephone visit is not appropriate, you will not be charged for this service.\"     HISTORY OF PRESENT ILLNESS:  Dimple Oviedo is a 87 year old female who is approximately three weeks status post right total knee arthroplasty. The patient presents via speaker phone with her son and daughter-in-law. Overall, Dimple has been well since surgery. Her recovery has been unremarkable, but she endorses a dull pain throughout her knee that is exacerbated by both activity and rest. She believes this pain is worse in the evening. Currently, she is using acetaminophen to help with her symptoms and is no longer using opioid pain medications. She is full weight bearing with a cane, tolerating it well. In PT, she is achieving 100 degrees flexion.     The patient endorses swelling around the surgical incision but denies surrounding redness. The incision has been dry, without discharge or drainage. Dimple denies recent fevers and chills, as well as any other symptoms concerning for infection.     Dimple is currently taking aspirin 162mg daily for DVT prophylaxis. Patient denies calf pain or tenderness.      MEDICATIONS:   Current Outpatient Rx   Medication Sig Dispense Refill     acetaminophen (TYLENOL) 325 MG tablet Take 3 tablets (975 mg) by mouth every 8 hours as needed for pain 1 Bottle 0     alendronate (FOSAMAX) 70 MG tablet TAKE 1 TABLET EVERY 7 DAYS AT LEAST 60 MINUTES BEFORE BREAKFAST AS DIRECTED. 12 tablet 3     aspirin (ASA) 81 MG EC tablet Take 2 tablets (162 mg) by mouth daily for 28 days 56 tablet 0     calcium carbonate-vitamin D (OS-SHREYA) 500-400 MG-UNIT tablet Take 1 tablet by mouth daily       cycloSPORINE (RESTASIS) 0.05 % ophthalmic emulsion Place 1 drop into both eyes 2 times daily       ferrous sulfate 325 (65 Fe) MG TBEC EC tablet Take 325 mg by mouth every morning        furosemide (LASIX) 20 MG tablet TAKE 1 TABLET (20 MG) BY MOUTH EVERY MORNING 90 tablet 3     hydrOXYzine (ATARAX) 10 MG tablet Take 1 " tablet (10 mg) by mouth every 6 hours as needed for itching or other (adjuvant pain) 10 tablet 0     irbesartan (AVAPRO) 300 MG tablet TAKE 1 TABLET EVERY DAY 90 tablet 3     levofloxacin (LEVAQUIN) 500 MG tablet Take one tablet 6 hours after treatment and one tablet the following morning after treatment. 6 tablet 0     lovastatin (MEVACOR) 40 MG tablet Take 1 tablet (40 mg) by mouth At Bedtime 90 tablet 3     melatonin 5 MG tablet Take 5 mg by mouth At Bedtime       methimazole (TAPAZOLE) 5 MG tablet Take 1 tablet (5 mg) by mouth daily 90 tablet 3     Omega-3 Fatty Acids (FISH OIL) 500 MG CAPS Take 1 capsule by mouth every morning        omeprazole (PRILOSEC) 20 MG DR capsule TAKE 1 CAPSULE EVERY DAY 90 capsule 3     ondansetron (ZOFRAN-ODT) 4 MG ODT tab Take 1 tablet (4 mg) by mouth every 6 hours as needed for nausea or vomiting 20 tablet 0     oxyCODONE (ROXICODONE) 5 MG tablet Take 1 tablet (5 mg) by mouth every 4 hours as needed for moderate to severe pain 20 tablet 0     polyethylene glycol (MIRALAX) 17 g packet Take 17 g by mouth daily 10 packet 0     Probiotic Product (PROBIOTIC ADVANCED PO) Take 1 capsule by mouth every morning        propranolol ER (INDERAL LA) 60 MG 24 hr capsule TAKE 1 CAPSULE EVERY MORNING 90 capsule 1     rOPINIRole (REQUIP) 0.25 MG tablet Take 2 tablets (0.5 mg) by mouth every afternoon at 4 PM, and take 1 tablet (0.25 mg) by mouth at bedtime daily.       senna-docusate (SENOKOT-S/PERICOLACE) 8.6-50 MG tablet Take 1-2 tablets by mouth 2 times daily as needed for constipation 30 tablet 0     sertraline (ZOLOFT) 100 MG tablet TAKE 1 TABLET (100 MG) BY MOUTH EVERY MORNING 90 tablet 2     traZODone (DESYREL) 50 MG tablet TAKE 1/2 TABLET AT BEDTIME 45 tablet 2         ALLERGIES: Codeine       PHYSICAL EXAMINATION:   A physical exam was not performed during this telephone visit. The patient was asked to send a photograph of their incision to me via MyChart/email.    ASSESSMENT/PLAN:   Dimple Oviedo is a 87 year old who is status post right total knee arthroplasty. The patient is progressing well.    Basic wound cares were discussed today. She may shower without covering the incision.    The patient will see Dr. Otero at six to eight weeks post op. Dimple has our clinic number and will call with any questions or concerns.    Edward Kim PA-C  Orthopaedic Surgery

## 2020-11-27 NOTE — LETTER
"    11/27/2020         RE: Dimple Oviedo  [address]  [facility] [address]        Dear Colleague,    Thank you for referring your patient, Dimple Oviedo, to the [facility] [place]. Please see a copy of my visit note below.    Dimple Oviedo is a 87 year old female who is being evaluated via a billable telephone visit.      The patient has been notified of following:     \"This telephone visit will be conducted via a call between you and your physician/provider. We have found that certain health care needs can be provided without the need for a physical exam.  This service lets us provide the care you need with a short phone conversation.  If a prescription is necessary we can send it directly to your pharmacy.  If lab work is needed we can place an order for that and you can then stop by our lab to have the test done at a later time.    Telephone visits are billed at different rates depending on your insurance coverage. During this emergency period, for some insurers they may be billed the same as an in-person visit.  Please reach out to your insurance provider with any questions.    If during the course of the call the physician/provider feels a telephone visit is not appropriate, you will not be charged for this service.\"    Patient has given verbal consent for Telephone visit?  Yes    What phone number would you like to be contacted at? [phone]    How would you like to obtain your AVS? Paulina    Phone call duration: 18 minutes    REASON FOR VIST/CC: Telephone follow-up appointment following right total knee arthroplasty with Dr. Otero on 11/9/2020. The patient was informed of the following:    \"This telephone visit will be conducted via a call between you and your physician/provider. We have found that certain health care needs can be provided without the need for a physical exam.  This service lets us provide the care you need with a short phone " "conversation.  If a prescription is necessary we can send it directly to your pharmacy.  If lab work is needed we can place an order for that and you can then stop by our lab to have the test done at a later time.     If during the course of the call the physician/provider feels a telephone visit is not appropriate, you will not be charged for this service.\"     HISTORY OF PRESENT ILLNESS:  Dimple Oviedo is a 87 year old female who is approximately three weeks status post right total knee arthroplasty. The patient presents via speaker phone with her son and daughter-in-law. Overall, Dimple has been well since surgery. Her recovery has been unremarkable, but she endorses a dull pain throughout her knee that is exacerbated by both activity and rest. She believes this pain is worse in the evening. Currently, she is using acetaminophen to help with her symptoms and is no longer using opioid pain medications. She is full weight bearing with a cane, tolerating it well. In PT, she is achieving 100 degrees flexion.     The patient endorses swelling around the surgical incision but denies surrounding redness. The incision has been dry, without discharge or drainage. Dimple denies recent fevers and chills, as well as any other symptoms concerning for infection.     Dimple is currently taking aspirin 162mg daily for DVT prophylaxis. Patient denies calf pain or tenderness.      MEDICATIONS:   Current Outpatient Rx   Medication Sig Dispense Refill     acetaminophen (TYLENOL) 325 MG tablet Take 3 tablets (975 mg) by mouth every 8 hours as needed for pain 1 Bottle 0     alendronate (FOSAMAX) 70 MG tablet TAKE 1 TABLET EVERY 7 DAYS AT LEAST 60 MINUTES BEFORE BREAKFAST AS DIRECTED. 12 tablet 3     aspirin (ASA) 81 MG EC tablet Take 2 tablets (162 mg) by mouth daily for 28 days 56 tablet 0     calcium carbonate-vitamin D (OS-SHREYA) 500-400 MG-UNIT tablet Take 1 tablet by mouth daily       cycloSPORINE (RESTASIS) 0.05 % ophthalmic " emulsion Place 1 drop into both eyes 2 times daily       ferrous sulfate 325 (65 Fe) MG TBEC EC tablet Take 325 mg by mouth every morning        furosemide (LASIX) 20 MG tablet TAKE 1 TABLET (20 MG) BY MOUTH EVERY MORNING 90 tablet 3     hydrOXYzine (ATARAX) 10 MG tablet Take 1 tablet (10 mg) by mouth every 6 hours as needed for itching or other (adjuvant pain) 10 tablet 0     irbesartan (AVAPRO) 300 MG tablet TAKE 1 TABLET EVERY DAY 90 tablet 3     levofloxacin (LEVAQUIN) 500 MG tablet Take one tablet 6 hours after treatment and one tablet the following morning after treatment. 6 tablet 0     lovastatin (MEVACOR) 40 MG tablet Take 1 tablet (40 mg) by mouth At Bedtime 90 tablet 3     melatonin 5 MG tablet Take 5 mg by mouth At Bedtime       methimazole (TAPAZOLE) 5 MG tablet Take 1 tablet (5 mg) by mouth daily 90 tablet 3     Omega-3 Fatty Acids (FISH OIL) 500 MG CAPS Take 1 capsule by mouth every morning        omeprazole (PRILOSEC) 20 MG DR capsule TAKE 1 CAPSULE EVERY DAY 90 capsule 3     ondansetron (ZOFRAN-ODT) 4 MG ODT tab Take 1 tablet (4 mg) by mouth every 6 hours as needed for nausea or vomiting 20 tablet 0     oxyCODONE (ROXICODONE) 5 MG tablet Take 1 tablet (5 mg) by mouth every 4 hours as needed for moderate to severe pain 20 tablet 0     polyethylene glycol (MIRALAX) 17 g packet Take 17 g by mouth daily 10 packet 0     Probiotic Product (PROBIOTIC ADVANCED PO) Take 1 capsule by mouth every morning        propranolol ER (INDERAL LA) 60 MG 24 hr capsule TAKE 1 CAPSULE EVERY MORNING 90 capsule 1     rOPINIRole (REQUIP) 0.25 MG tablet Take 2 tablets (0.5 mg) by mouth every afternoon at 4 PM, and take 1 tablet (0.25 mg) by mouth at bedtime daily.       senna-docusate (SENOKOT-S/PERICOLACE) 8.6-50 MG tablet Take 1-2 tablets by mouth 2 times daily as needed for constipation 30 tablet 0     sertraline (ZOLOFT) 100 MG tablet TAKE 1 TABLET (100 MG) BY MOUTH EVERY MORNING 90 tablet 2     traZODone (DESYREL) 50 MG  tablet TAKE 1/2 TABLET AT BEDTIME 45 tablet 2         ALLERGIES: Codeine       PHYSICAL EXAMINATION:   A physical exam was not performed during this telephone visit. The patient was asked to send a photograph of their incision to me via MyChart/email.    ASSESSMENT/PLAN:  Dimple Oviedo is a 87 year old who is status post right total knee arthroplasty. The patient is progressing well.    Basic wound cares were discussed today. She may shower without covering the incision.    The patient will see Dr. Otero at six to eight weeks post op. Dimple has our clinic number and will call with any questions or concerns.    Edward Kim PA-C  Orthopaedic Surgery

## 2020-11-30 ENCOUNTER — HOME CARE/HOSPICE - HEALTHEAST (OUTPATIENT)
Dept: HOME HEALTH SERVICES | Facility: HOME HEALTH | Age: 85
End: 2020-11-30

## 2020-12-02 ENCOUNTER — INFUSION THERAPY VISIT (OUTPATIENT)
Dept: ONCOLOGY | Facility: CLINIC | Age: 85
End: 2020-12-02
Attending: UROLOGY
Payer: MEDICARE

## 2020-12-02 VITALS
SYSTOLIC BLOOD PRESSURE: 147 MMHG | TEMPERATURE: 98.2 F | RESPIRATION RATE: 16 BRPM | OXYGEN SATURATION: 96 % | HEART RATE: 66 BPM | DIASTOLIC BLOOD PRESSURE: 50 MMHG

## 2020-12-02 DIAGNOSIS — C66.1 UROTHELIAL CARCINOMA OF RIGHT DISTAL URETER (H): ICD-10-CM

## 2020-12-02 DIAGNOSIS — C68.9 UROTHELIAL CANCER (H): Primary | ICD-10-CM

## 2020-12-02 DIAGNOSIS — C68.9 UROTHELIAL CARCINOMA (H): ICD-10-CM

## 2020-12-02 LAB
ALBUMIN UR-MCNC: NEGATIVE MG/DL
APPEARANCE UR: CLEAR
BILIRUB UR QL STRIP: NEGATIVE
COLOR UR AUTO: NORMAL
GLUCOSE UR STRIP-MCNC: NEGATIVE MG/DL
HGB UR QL STRIP: NEGATIVE
KETONES UR STRIP-MCNC: NEGATIVE MG/DL
LEUKOCYTE ESTERASE UR QL STRIP: NEGATIVE
NITRATE UR QL: NEGATIVE
PH UR STRIP: 5 PH (ref 5–7)
SOURCE: NORMAL
SP GR UR STRIP: 1.01 (ref 1–1.03)
UROBILINOGEN UR STRIP-MCNC: 0 MG/DL (ref 0–2)

## 2020-12-02 PROCEDURE — 250N000011 HC RX IP 250 OP 636: Performed by: UROLOGY

## 2020-12-02 PROCEDURE — 81003 URINALYSIS AUTO W/O SCOPE: CPT | Performed by: UROLOGY

## 2020-12-02 PROCEDURE — 51720 TREATMENT OF BLADDER LESION: CPT

## 2020-12-02 RX ADMIN — BACILLUS CALMETTE-GUERIN 50 MG: 50 POWDER, FOR SUSPENSION INTRAVESICAL at 13:28

## 2020-12-02 ASSESSMENT — PAIN SCALES - GENERAL: PAINLEVEL: MILD PAIN (3)

## 2020-12-02 NOTE — PROGRESS NOTES
Infusion Nursing Note:  Dimple Oviedo presents today for BCG.  Instillation number 2 of 3.   Patient seen by provider today: No   present during visit today: Not Applicable.    Note:  Dimple stayed to dwell last week and was able to hold the BCG for 2 hours.  She denies any issues w/urgency, frequency or burning.  Denies seeing any blood in her urine.  Has some baseline incontinence which is not new or worse.  Is on Lasix.  Took her Levaquin as prescribed and has plenty to complete this course.  Completed the bleaching process as instructed.    S/P right knee replacement 11/11/2020.  Reports pain today at a 3.  Takes Tylenol for this and took a dose here today.    No visible blood seen in today's urine sent to lab.      Treatment Conditions:   Patient states no concerns or issues at this time.  Ok to proceed with treatment.     Labs Reviewed:  Urine analysis.  UA RESULTS:  Recent Labs   Lab Test 12/02/20  1200 09/14/20  1144 09/14/20  1144   COLOR Straw   < > Yellow   APPEARANCE Clear   < > Clear   URINEGLC Negative   < > Negative   URINEBILI Negative   < > Negative   URINEKETONE Negative   < > Negative   SG 1.006   < > <=1.005   UBLD Negative   < > Trace*   URINEPH 5.0   < > 5.5   PROTEIN Negative   < > Negative   UROBILINOGEN  --   --  0.2   NITRITE Negative   < > Negative   LEUKEST Negative   < > Small*   RBCU  --   --  O - 2   WBCU  --   --  5-10*    < > = values in this interval not displayed.       Procedure:  16F goldman catheter placed.  Catheter placed without trauma.  Clear/yellow urine drained from bladder.    Patient turned every 15 minutes per protocol: Yes, turning completed at clinic per protocol.  Patient tolerated instillation without incident.    BCG Dwell time today:  2 hours      Discharge Plan:   Patient declined prescription refills.  Copy of AVS reviewed with patient and/or family.  Patient will return 12/10/2020 for next appointment.  Patient discharged in stable condition  accompanied by: self.  Departure Mode: Ambulatory with her walker.  Face to Face time: 0.    Gemma Puga RN, RN

## 2020-12-02 NOTE — PATIENT INSTRUCTIONS
Home discharge instructions:  -To make sure each treatment has the best chance of working, follow these steps 2 hours after each treatment   1. Lie on your back for 15 minues   2. Lie on your left side for 15 mintues   3. Lie on your right side for 15 minutes   4. Get up but keep the medication  In your bladder for 60 more minutes- for a total of 2 hours   5. Empty your bladder following the directions below (if you have difficulty holding your bladder it is ok to empty your bladder early)  -Wear pad if incontinence is a possibliity  -Wash hands throughly after using the bathroom  -For safety reasons remember to follow these directions for 6 hours after each treatment:  (for 6 hours after your empty the BCG from your bladder)   1. Sit on the toilet seat to urinate   2. Immediately after urinating- add 2 cups of undiluted chlorine bleach to the toilet    3. Close lid and let the bleach sit for 15 minutes each time   4. Drink plenty of water to flush any remaining medication out of your bladder    **These steps will help kill all the BCG bacteria and disinfect the toilet**    Please void BCG at 3:30pm    Take your antibiotic today at 7:30 pm and tomorrow morning.      Please call urology triage line at [phone] or [phone] with fevers or chills, burning with urination, or blood in your urine which lasts more than a 48-72 hours or with any question or concerns.      December 2020 Sunday Monday Tuesday Wednesday Thursday Friday Saturday             1     2    [facility] ONC INFUSION 180  12:00 PM   (180 min.)    ONCOLOGY INFUSION   [facility] 3     4     5       6     7     8     9    CT CHEST/ABDOMEN/PELVIS W  10:05 AM   (20 min.)   [id number]   [facility] LAB DRAW  11:45 AM   (15 min.)   [facility] DRAW   [facility] RETURN  12:15 PM   (30 min.)   Jaden Toledo MD   [facility]  [facility] 10    [facility] ONC INFUSION 180  12:00 PM   (180 min.)   [facility] ONCOLOGY INFUSION   [facility] 11     12       13     14     15     16    [facility] RETURN KNEE  12:00 PM   (15 min.)   Edward Otero MD   [facility] [place] 17     18     19       20     21     22     23     24     25     26       27     28     29     30     31                           January 2021 Sunday Monday Tuesday Wednesday Thursday Friday Saturday                            1     2       3     4     5     6     7     8     9       10     11     12     13     14     15     16       17     18     19     20     21     22    CYSTOSCOPY   8:25 AM   (20 min.)   Justin Henry MD   [facility] [place] 23       24     25     26     27     28     29     30       31

## 2020-12-03 ENCOUNTER — HOME CARE/HOSPICE - HEALTHEAST (OUTPATIENT)
Dept: HOME HEALTH SERVICES | Facility: HOME HEALTH | Age: 85
End: 2020-12-03

## 2020-12-03 ENCOUNTER — TELEPHONE (OUTPATIENT)
Dept: SURGERY | Facility: CLINIC | Age: 85
End: 2020-12-03

## 2020-12-08 DIAGNOSIS — Z98.890 STATUS POST KNEE SURGERY: Primary | ICD-10-CM

## 2020-12-10 ENCOUNTER — INFUSION THERAPY VISIT (OUTPATIENT)
Dept: ONCOLOGY | Facility: CLINIC | Age: 85
End: 2020-12-10
Attending: UROLOGY
Payer: MEDICARE

## 2020-12-10 VITALS
TEMPERATURE: 98.4 F | RESPIRATION RATE: 20 BRPM | OXYGEN SATURATION: 98 % | HEART RATE: 66 BPM | SYSTOLIC BLOOD PRESSURE: 152 MMHG | DIASTOLIC BLOOD PRESSURE: 82 MMHG

## 2020-12-10 DIAGNOSIS — C68.9 UROTHELIAL CARCINOMA (H): ICD-10-CM

## 2020-12-10 DIAGNOSIS — C68.9 UROTHELIAL CANCER (H): Primary | ICD-10-CM

## 2020-12-10 DIAGNOSIS — C66.1 UROTHELIAL CARCINOMA OF RIGHT DISTAL URETER (H): ICD-10-CM

## 2020-12-10 PROCEDURE — 81003 URINALYSIS AUTO W/O SCOPE: CPT | Performed by: UROLOGY

## 2020-12-10 PROCEDURE — 88112 CYTOPATH CELL ENHANCE TECH: CPT | Mod: TC | Performed by: UROLOGY

## 2020-12-10 PROCEDURE — 88120 CYTP URNE 3-5 PROBES EA SPEC: CPT | Mod: TC | Performed by: UROLOGY

## 2020-12-10 PROCEDURE — 88120 CYTP URNE 3-5 PROBES EA SPEC: CPT | Mod: 26 | Performed by: PATHOLOGY

## 2020-12-10 PROCEDURE — 250N000011 HC RX IP 250 OP 636: Performed by: UROLOGY

## 2020-12-10 PROCEDURE — 51720 TREATMENT OF BLADDER LESION: CPT

## 2020-12-10 PROCEDURE — 88112 CYTOPATH CELL ENHANCE TECH: CPT | Mod: 26 | Performed by: PATHOLOGY

## 2020-12-10 RX ADMIN — BACILLUS CALMETTE-GUERIN 50 MG: 50 POWDER, FOR SUSPENSION INTRAVESICAL at 13:21

## 2020-12-10 ASSESSMENT — PAIN SCALES - GENERAL: PAINLEVEL: NO PAIN (0)

## 2020-12-10 NOTE — PATIENT INSTRUCTIONS
Home discharge instructions:  -To make sure each treatment has the best chance of working, follow these steps 2 hours after each treatment                        1. Lie on your back for 15 minues                        2. Lie on your left side for 15 mintues                        3. Lie on your right side for 15 minutes                        4. Get up but keep the medication  In your bladder for 60 more minutes- for a total of 2 hours                        5. Empty your bladder following the directions below (if you have difficulty holding your bladder it is ok to empty your bladder early)  -Wear pad if incontinence is a possibliity  -Wash hands throughly after using the bathroom  -For safety reasons remember to follow these directions for 6 hours after each treatment:                        1. Sit on the toilet seat to urinate                        2. Immediately after urinating- add 2 cups of undiluted chlorine bleach to the toilet                         3. Close lid and let the bleach sit for 15 minutes each time                        4. Drink plenty of water to flush any remaining medication out of your bladder     **These steps will help kill all the BCG bacteria and disinfect the toilet**       Take your antibiotic today at 0730pm and tomorrow morning.        Please call urology triage line at [phone] or [phone] with fevers or chills, burning with urination, or blood in your urine which lasts more than a 48-72 hours or with any question or concerns.

## 2020-12-10 NOTE — PROGRESS NOTES
Infusion Nursing Note:  Dimple Oviedo presents today for Maintenance BCG.  Instillation number 3 of 3.   Patient seen by provider today: No   present during visit today: Not Applicable.    Note: Dimple presents to infusion today stating she feels well. She denies any concerns, symptoms, or pain today. Following last week's treatment, she denies any fevers, chills, hematuria, dysuria, or increased urgency or frequency. She took Levaquin as ordered and has two remaining tablets for this treatment.     Pre-procedure Assessment:  Dysuria: No  Hematuria: No  Fever/chills: No  Increased urinary urgency: No  Increased urinary frequency: No    Labs Reviewed:  Urine analysis.  Results meet treatment conditions.     UA RESULTS:  Lab Test 12/10/20  1200   COLOR Yellow   APPEARANCE Clear   URINEGLC Negative   URINEBILI Negative   URINEKETONE Negative   SG 1.010   UBLD Negative   URINEPH 5.0   PROTEIN Negative   UROBILINOGEN  --    NITRITE Negative   LEUKEST Negative   RBCU  --    WBCU  --          Treatment Conditions:   Patient states no concerns or issues at this time. No visible blood noted in today's urine sample.  Ok to proceed with treatment.     Procedure:  16F Diehl catheter placed.  Catheter placed without trauma.  Clear/yellow urine drained from bladder.    Patient turned every 15 minutes per protocol: Yes, turning completed at clinic per protocol.  Patient tolerated instillation without incident for 2 hours.    Patient instructed on the following and verbalized understanding:   Post instillation side effects and precautions discussed: YES  Levaquin to be taken 6 hours post-instillation and tomorrow morning: YES    Discharge Plan:   Patient declined prescription refills.  Discharge instructions reviewed with: Patient.  Patient and/or family verbalized understanding of discharge instructions and all questions answered.  AVS to patient via [employer].  Patient will return 1/22/20 for next appointment.    Patient discharged in stable condition accompanied by: self.  Departure Mode: Ambulatory.    Tatiana Lopez RN

## 2020-12-16 ENCOUNTER — ANCILLARY PROCEDURE (OUTPATIENT)
Dept: GENERAL RADIOLOGY | Facility: CLINIC | Age: 85
End: 2020-12-16
Attending: ORTHOPAEDIC SURGERY
Payer: MEDICARE

## 2020-12-16 ENCOUNTER — OFFICE VISIT (OUTPATIENT)
Dept: ORTHOPEDICS | Facility: CLINIC | Age: 85
End: 2020-12-16
Payer: MEDICARE

## 2020-12-16 DIAGNOSIS — Z98.890 STATUS POST KNEE SURGERY: ICD-10-CM

## 2020-12-16 DIAGNOSIS — Z98.890 STATUS POST KNEE SURGERY: Primary | ICD-10-CM

## 2020-12-16 PROCEDURE — 77073 BONE LENGTH STUDIES: CPT | Performed by: RADIOLOGY

## 2020-12-16 PROCEDURE — 99024 POSTOP FOLLOW-UP VISIT: CPT | Performed by: ORTHOPAEDIC SURGERY

## 2020-12-16 PROCEDURE — 73560 X-RAY EXAM OF KNEE 1 OR 2: CPT | Mod: XU | Performed by: RADIOLOGY

## 2020-12-16 NOTE — NURSING NOTE
Reason For Visit:   Chief Complaint   Patient presents with     Surgical Followup     DOS 11/9/20 S/P Arthroplasty, Knee, Total, Right        Primary MD: Pop Zapien  Referring MD: Edward Otero            There were no vitals taken for this visit.    Pain Assessment  Patient Currently in Pain: Denies    Current Outpatient Medications   Medication     acetaminophen (TYLENOL) 325 MG tablet     alendronate (FOSAMAX) 70 MG tablet     calcium carbonate-vitamin D (OS-SHREYA) 500-400 MG-UNIT tablet     cycloSPORINE (RESTASIS) 0.05 % ophthalmic emulsion     ferrous sulfate 325 (65 Fe) MG TBEC EC tablet     furosemide (LASIX) 20 MG tablet     hydrOXYzine (ATARAX) 10 MG tablet     irbesartan (AVAPRO) 300 MG tablet     levofloxacin (LEVAQUIN) 500 MG tablet     lovastatin (MEVACOR) 40 MG tablet     melatonin 5 MG tablet     methimazole (TAPAZOLE) 5 MG tablet     Omega-3 Fatty Acids (FISH OIL) 500 MG CAPS     omeprazole (PRILOSEC) 20 MG DR capsule     ondansetron (ZOFRAN-ODT) 4 MG ODT tab     oxyCODONE (ROXICODONE) 5 MG tablet     polyethylene glycol (MIRALAX) 17 g packet     Probiotic Product (PROBIOTIC ADVANCED PO)     propranolol ER (INDERAL LA) 60 MG 24 hr capsule     rOPINIRole (REQUIP) 0.25 MG tablet     senna-docusate (SENOKOT-S/PERICOLACE) 8.6-50 MG tablet     sertraline (ZOLOFT) 100 MG tablet     traZODone (DESYREL) 50 MG tablet     No current facility-administered medications for this visit.           Allergies   Allergen Reactions     Shauna Britton LPN

## 2020-12-16 NOTE — LETTER
12/16/2020         RE: Dimple Oviedo  [address]  [facility] [address]        Dear Colleague,    Thank you for referring your patient, Dimple Oviedo, to the [facility] [place]. Please see a copy of my visit note below.           Interval History:   Dimple Oviedo is a 87 year old female who is here follow up for  Right TKA - 11/9/2020    Dimple has been making excellent progress following the right total knee replacement.  Her mobility, pain, and the range of motion has been steadily improving.  She is using a cane for ambulation but is steadily weaning off of this.  All in all she is very happy with the progress she is making.         Physical Exam:     NAD  AOx3  Interactive and cooperative with the exam.  She walks with a well-balanced gait.  The knee incision is well-healed.  Her range of motion is from 0 to 110 degrees.  She has no pain with knee range of motion and the extensor mechanism is intact.         Imaging:      X-rays demonstrate well fixed well-positioned cemented knee replacement.       Assessment and Plan:        Dimple Oviedo is a 87 year old female is s/p the above procedure.  She is making excellent progress.  She will continue to progress with physical therapy.  She will progress with activities and exercises as tolerated in a stepwise fashion.  I will see her back in clinic in 6 weeks if she has any ongoing issues.  She will let me know if she has any questions or concerns in the meantime.    Edward Otero M.D.     Arthritis and Joint Replacement  Department of Orthopaedic Surgery, [facility]  [email or web address]  [phone] (pager)

## 2020-12-21 LAB — COPATH REPORT: NORMAL

## 2020-12-22 NOTE — PROGRESS NOTES
Interval History:   Dimple Oviedo is a 87 year old female who is here follow up for  Right TKA - 11/9/2020    Dimple has been making excellent progress following the right total knee replacement.  Her mobility, pain, and the range of motion has been steadily improving.  She is using a cane for ambulation but is steadily weaning off of this.  All in all she is very happy with the progress she is making.         Physical Exam:     NAD  AOx3  Interactive and cooperative with the exam.  She walks with a well-balanced gait.  The knee incision is well-healed.  Her range of motion is from 0 to 110 degrees.  She has no pain with knee range of motion and the extensor mechanism is intact.         Imaging:      X-rays demonstrate well fixed well-positioned cemented knee replacement.       Assessment and Plan:        Dimple Oviedo is a 87 year old female is s/p the above procedure.  She is making excellent progress.  She will continue to progress with physical therapy.  She will progress with activities and exercises as tolerated in a stepwise fashion.  I will see her back in clinic in 6 weeks if she has any ongoing issues.  She will let me know if she has any questions or concerns in the meantime.    Edward Otero M.D.     Arthritis and Joint Replacement  Department of Orthopaedic Surgery, [facility]  [email or web address]  [phone] (pager)

## 2020-12-28 ENCOUNTER — PRE VISIT (OUTPATIENT)
Dept: UROLOGY | Facility: CLINIC | Age: 85
End: 2020-12-28

## 2021-01-04 NOTE — PROGRESS NOTES
Outpatient Physical Therapy Discharge Note     Patient: Dimple Oviedo  : 1933    Beginning/End Dates of Reporting Period:  10/6/20 to 2021    Referring Provider: Dr. Pop Zapien    Therapy Diagnosis: Lymphedema B LE     Client Self Report: Pt went to [facility] but was unable to meet with the fitter as she did not have an appointment.  She was measured but they didn't have the type of stocking that patient was interested in.      Objective Measurements:  Objective Measure: volume  Details: R 3131.34mL, L 3122.74mL    Objective Measure: pitting  Details: 1+ near ankle on the L, R not assessed    Outcome Measures (most recent score): LLIS 16     Goals:  Goal Identifier Home program   Goal Description Pt will return to completing her home program daily including exercise and self massage.   Target Date 20   Date Met   10/21/20   Progress:     Goal Identifier Compression   Goal Description Pt will be able to direct assist for appropriate compression plan following her TKA if unable to don stockings or wrap herself.   Target Date 20   Date Met   20   Progress:     Goal Identifier Maintenance   Goal Description Pt will maintain R LE volume within 2% of current at post-op 6 weeks to allow her to progress well with her TKA rehab.     Target Date 21   Date Met      Progress: not reassessed, pt did not return to clinic after TKA, reports that her swelling is fine       Progress Toward Goals:   Progress this reporting period: Pt was seen for evaluation of chronic lymphedema before TKA surgery.  She was seen for 2 visits and encouraged to get new garments, given resources for this.  She was called 3 weeks following surgery and reports that her swelling is fine, she has no needs to return to clinic. Pt was getting home therapy but sounded to be nearing discharge due to good mobility.    Plan:  Discharge from therapy.    Discharge:    Reason for Discharge: No needs following TKA surgery, per  patient    Equipment Issued: home program    Discharge Plan: Patient to continue home program.

## 2021-01-10 NOTE — RESULT ENCOUNTER NOTE
Ms. Preet,  Your cytology has come back positive again, despite your negative biopsies in August.  I don't think we need to do anything immediately.  We can talk about options when you come in for your next cystoscopy.  Thank you, Froilan Henry.

## 2021-01-12 ENCOUNTER — ANCILLARY PROCEDURE (OUTPATIENT)
Dept: CT IMAGING | Facility: CLINIC | Age: 86
End: 2021-01-12
Attending: INTERNAL MEDICINE
Payer: MEDICARE

## 2021-01-12 DIAGNOSIS — C66.1 UROTHELIAL CARCINOMA OF RIGHT DISTAL URETER (H): ICD-10-CM

## 2021-01-12 DIAGNOSIS — D63.0 ANEMIA IN NEOPLASTIC DISEASE: ICD-10-CM

## 2021-01-12 DIAGNOSIS — C68.9 UROTHELIAL CARCINOMA (H): ICD-10-CM

## 2021-01-12 LAB
ALBUMIN SERPL-MCNC: 3.2 G/DL (ref 3.4–5)
ALP SERPL-CCNC: 91 U/L (ref 40–150)
ALT SERPL W P-5'-P-CCNC: 18 U/L (ref 0–50)
ANION GAP SERPL CALCULATED.3IONS-SCNC: 2 MMOL/L (ref 3–14)
AST SERPL W P-5'-P-CCNC: 16 U/L (ref 0–45)
BASOPHILS # BLD AUTO: 0 10E9/L (ref 0–0.2)
BASOPHILS NFR BLD AUTO: 0.6 %
BILIRUB SERPL-MCNC: 0.4 MG/DL (ref 0.2–1.3)
BUN SERPL-MCNC: 22 MG/DL (ref 7–30)
CALCIUM SERPL-MCNC: 8.6 MG/DL (ref 8.5–10.1)
CHLORIDE SERPL-SCNC: 100 MMOL/L (ref 94–109)
CO2 SERPL-SCNC: 31 MMOL/L (ref 20–32)
CREAT BLD-MCNC: 1.1 MG/DL (ref 0.52–1.04)
CREAT SERPL-MCNC: 0.97 MG/DL (ref 0.52–1.04)
DIFFERENTIAL METHOD BLD: ABNORMAL
EOSINOPHIL # BLD AUTO: 0.1 10E9/L (ref 0–0.7)
EOSINOPHIL NFR BLD AUTO: 1.9 %
ERYTHROCYTE [DISTWIDTH] IN BLOOD BY AUTOMATED COUNT: 12.9 % (ref 10–15)
GFR SERPL CREATININE-BSD FRML MDRD: 47 ML/MIN/{1.73_M2}
GFR SERPL CREATININE-BSD FRML MDRD: 52 ML/MIN/{1.73_M2}
GLUCOSE SERPL-MCNC: 110 MG/DL (ref 70–99)
HCT VFR BLD AUTO: 38 % (ref 35–47)
HGB BLD-MCNC: 11.7 G/DL (ref 11.7–15.7)
IMM GRANULOCYTES # BLD: 0 10E9/L (ref 0–0.4)
IMM GRANULOCYTES NFR BLD: 0.5 %
LYMPHOCYTES # BLD AUTO: 1.3 10E9/L (ref 0.8–5.3)
LYMPHOCYTES NFR BLD AUTO: 20.2 %
MCH RBC QN AUTO: 30.9 PG (ref 26.5–33)
MCHC RBC AUTO-ENTMCNC: 30.8 G/DL (ref 31.5–36.5)
MCV RBC AUTO: 100 FL (ref 78–100)
MONOCYTES # BLD AUTO: 0.6 10E9/L (ref 0–1.3)
MONOCYTES NFR BLD AUTO: 9.2 %
NEUTROPHILS # BLD AUTO: 4.2 10E9/L (ref 1.6–8.3)
NEUTROPHILS NFR BLD AUTO: 67.6 %
NRBC # BLD AUTO: 0 10*3/UL
NRBC BLD AUTO-RTO: 0 /100
PLATELET # BLD AUTO: 171 10E9/L (ref 150–450)
POTASSIUM SERPL-SCNC: 5.6 MMOL/L (ref 3.4–5.3)
PROT SERPL-MCNC: 6.8 G/DL (ref 6.8–8.8)
RBC # BLD AUTO: 3.79 10E12/L (ref 3.8–5.2)
SODIUM SERPL-SCNC: 133 MMOL/L (ref 133–144)
WBC # BLD AUTO: 6.2 10E9/L (ref 4–11)

## 2021-01-12 PROCEDURE — 82565 ASSAY OF CREATININE: CPT | Mod: 59 | Performed by: PATHOLOGY

## 2021-01-12 PROCEDURE — 80053 COMPREHEN METABOLIC PANEL: CPT | Performed by: PATHOLOGY

## 2021-01-12 PROCEDURE — G1004 CDSM NDSC: HCPCS | Performed by: RADIOLOGY

## 2021-01-12 PROCEDURE — 85025 COMPLETE CBC W/AUTO DIFF WBC: CPT | Performed by: PATHOLOGY

## 2021-01-12 PROCEDURE — 74178 CT ABD&PLV WO CNTR FLWD CNTR: CPT | Mod: MG | Performed by: RADIOLOGY

## 2021-01-12 PROCEDURE — 71260 CT THORAX DX C+: CPT | Mod: MG | Performed by: RADIOLOGY

## 2021-01-12 PROCEDURE — 36415 COLL VENOUS BLD VENIPUNCTURE: CPT | Performed by: PATHOLOGY

## 2021-01-12 RX ORDER — IOPAMIDOL 755 MG/ML
104 INJECTION, SOLUTION INTRAVASCULAR ONCE
Status: COMPLETED | OUTPATIENT
Start: 2021-01-12 | End: 2021-01-12

## 2021-01-12 RX ADMIN — IOPAMIDOL 104 ML: 755 INJECTION, SOLUTION INTRAVASCULAR at 12:11

## 2021-01-13 ENCOUNTER — VIRTUAL VISIT (OUTPATIENT)
Dept: ONCOLOGY | Facility: CLINIC | Age: 86
End: 2021-01-13
Attending: INTERNAL MEDICINE
Payer: MEDICARE

## 2021-01-13 DIAGNOSIS — C66.1 UROTHELIAL CARCINOMA OF RIGHT DISTAL URETER (H): ICD-10-CM

## 2021-01-13 DIAGNOSIS — I50.22 CHRONIC SYSTOLIC HEART FAILURE (H): Primary | ICD-10-CM

## 2021-01-13 PROCEDURE — 99443 PR PHYSICIAN TELEPHONE EVALUATION 21-30 MIN: CPT | Mod: 95 | Performed by: INTERNAL MEDICINE

## 2021-01-13 PROCEDURE — 999N001193 HC VIDEO/TELEPHONE VISIT; NO CHARGE

## 2021-01-13 NOTE — LETTER
1/13/2021         RE: Dimple Oviedo  [address]  [facility] [address]        Dear Colleague,    Thank you for referring your patient, Dimple Oviedo, to the [facility]. Please see a copy of my visit note below.    Dimple is a 87 year old who is being evaluated via a billable telephone visit.      What phone number would you like to be contacted at? [phone]  How would you like to obtain your AVS? 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    HISTORY OF PRESENT ILLNESS:  Ms. 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 Her oncologic history is as under.    Ms. Oviedo is doing well overall. Unable to play cards due to COVID-19, but trying to stay active. Underwent L knee arthroplasty for OA in Nov 2020. Recovering well but has chronic lymphedema. Taking diuretic and followed by cardiology for heart failure. She denies fever, chills, overt shortness of breath or chest pain.    There is no clinical evidence of disease progression. Urinary (urge) incontinence x 2.5 years stable and signs/symptoms of UTI. No chest issues but has h/o afib and chronic lymphedema; taking diuretics.    She follows with Dr. Henry in urology on 7/24/20 and last cystoscopy was on the same day. It was negative. However, urine cytology was positive for urothelial carcinoma in Dec 2020 after second course of BCG. Due to follow-up soon.      ONCOLOGIC HISTORY:  1. 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   - Jan 2018 to  "September 2018- Persistent gross hematuria and underwent multiple procedures.    - 2/19/18 and 4/3/18- cystoscopies with bladder biopsies  which were negative for bladder tumor  - 1/17/18, 3/8/18, 7/26/18, 9/10/18- Multiple urine cytologies have come back positive by FISH for high-grade transitional cell carcinoma  - 9/10/18- Underwent a bilateral cystoureteroscopy with biopsy and brushing that showed  right upper tract cytology suspicious for high-grade urothelial ca. Right ureter brushing negative from that day. Left upper tract negative.  - 9/10/18- CT A/P without contrast showed new small bilateral pleural effusions and interstitial pulmonary edema but no evidence of locoregional or metastatic disease.  - 9/12/18- Presented to ED with oliguria, CRESENCIO, and mild hydronephrosis bilaterally on CT scan and underwent bilateral ureteral stent placment  - 11/13/2018- Right robotic nephroureterectomy, excision of ana-caval mass and LN excision by Stuart King. Pathology showed \"Right nephroureterectomy: Invasive high grade urothelial carcinoma measuring 3.5 cm, located in renal pelvis and invading through renal parenchyma into perinephric fat. Urothelial carcinoma in-situ present. Margins free of tumor. Ana-caval mass: Metastatic urothelial carcinoma involving one lymph node with at least 1 cm tumor deposit. Pericaval Extranodal Extension present. Five additional benign pericaval lymph nodes. Pathologic stage: pT4N1 (1 of 6 lymph nodes).\"  - 12/11/18- PET/CT whole body demonstrated significant residual FDG avid disease. For example, \"there is an FDG avid ill-defined pericaval mass immediately medial to the surgical clips measuring approximately 2.0 x 1.4 cm, with max SUV measuring up to12.2, see series 4 image 21. Additional FDG avid retrocaval and interaortocaval lymphadenopathy are noted.There is a 1.2 cm nodule in the right hemipelvis posterior lateral tothe bladder with max SUV measuring 8.3, see series 4 image " "77. The bladder is incompletely distended.\" No suspicious thoracic or bone uptake.  - 12/12/18- Started adjuvant chemotherapy (carbo+gem) per POUT trial. Cycle 2 - 1/2/19. Cycle 3 - 1/23/19. Cycle 4 - 02/13/19.  - 1/22/19 - CT C/A/P with contrast compared with prior PET/CT: \"1. New well-circumscribed soft tissue pulmonary nodules of the right lower lobe and left upper lobe. Findings could be infectious, inflammatory, or represent metastatic disease. Continued attention on follow-up recommended. Postoperative changes of right nephrectomy. No evidence for local recurrence in the present study.\"  - 3/1/2019- CT C/A/P with contrast - \"1. Bilateral pulmonary nodules measuring up to 4 mm in the right lower lobe, previously measuring 8 mm. No new or enlarging pulmonary nodules. 2. No convincing evidence for metastatic disease in the abdomen, pelvis and bones.\"  - 6/3/2019- CT C/A/P with contrast - \"1. Surgical changes of right nephrectomy without evidence of residual or recurrent disease on this noncontrast exam.  Consider contrast enhanced examination on follow-up. 2. No evidence of metastatic disease in the abdomen, or pelvis on noncontrast CT.  Scattered tiny pulmonary nodules in the chest are not significantly changed.  Previously described prominent right lower lobe pulmonary nodule (previously measuring up to 8 mm) is no longer visualized. 3. Stable bilateral pulmonary nodules measuring maximally 4 mm.\"  - 09/11/19: CT C/A/P without contrast - \"1. Stable exam without evidence convincing for new disease. 2. Stable pulmonary nodules. 3. 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 4. Stable heterogeneous enlargement of the thyroid with underlying nodules suggested.\"  - 12/4/19: CT C/A/P without contrast - \"No acute change since prior exam. No evidence of recurrent or metastatic disease. Tiny lung nodules are stable. Prior right nephrectomy. Left renal artery aneurysm is stable.\"  - 04/08/20, 7/27/20: CT " "C/A/P with contrast - GABRIELLE.  - 01/12/21: CT C/A/P with contrast - \"1.  Slight increased size of a 6 mm left upper lobe nodule and new 4 mm linear opacity along the right major fissure. These are indeterminate but could represent progression of metastatic disease. 2.  Slight increased size of mildly enlarged mediastinal and hilar lymph nodes. 3.  Mild pulmonary edema. 4.  No recurrent or metastatic disease in the abdomen and pelvis. 5.  Mild stranding around the urinary bladder dome may be related to cystitis. Punctate focus of gas within the urinary bladder either secondary to infection or instrumentation. 6.  Enlarged multinodular thyroid gland.\"    2. Non-muscle invasive bladder cancer:  - 10/3/19: Cystoscopy showed a small area of erythema on retroflexion, possibly pre-existing or due to retroflexion of the scope. Urine cytology from that time showed cells suspicious for malignancy.   - 1/13/20: Bladder biopsy showed high grade urothelial carcinoma in-situ  - 2/12/20-3/18/20: Intravesical BCG therapy  - 7/24/20 and last cystoscopy was on the same day. It was negative. However, urine cytology was positive for high-grade urothelial carcinoma.   - 11/25/20-12/10/2020: 3 weekly doses of intravesical BCG 50mg.   - 12/10/20: Cytology suspicious and FISH urovysion positive for TCC.    REVIEW OF SYSTEMS: 14 point ROS negative other than the symptoms noted above in the HPI.    PAST MEDICAL AND SURGICAL HISTORY:   Past Medical History:   Diagnosis Date     Calculus of kidney      Chemotherapy-induced neutropenia (H) 3/6/2019     Esophageal reflux      GERD (gastroesophageal reflux disease)      Hyperlipidemia LDL goal <130 5/9/2010     Malignant melanoma of skin of trunk, except scrotum (H)      Nonspecific abnormal finding     has living will 2004 -      Nontoxic multinodular goiter     no further eval /tx rec per pt     Osteopenia      Other psoriasis      Personal history of colonic polyps      PMR (polymyalgia " rheumatica) (H)      Stress-induced cardiomyopathy      Undiagnosed cardiac murmurs      Unspecified constipation      Unspecified essential hypertension      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     Past Surgical History:   Procedure Laterality Date     ARTHROPLASTY KNEE Right 11/9/2020    Procedure: ARTHROPLASTY, KNEE, TOTAL, RIGHT;  Surgeon: Edward Otero MD;  [facility]     BIOPSY       COLONOSCOPY  2014     COMBINED CYSTOSCOPY, INSERT STENT URETER(S) Bilateral 9/12/2018    Procedure: COMBINED CYSTOSCOPY, INSERT STENT URETER(S);  Cystoscopy Bilateral ureteral Stent Placement.;  Surgeon: Justin Henry MD;  [facility]     COMBINED CYSTOSCOPY, RETROGRADES, URETEROSCOPY, INSERT STENT Bilateral 4/3/2018    Procedure: COMBINED CYSTOSCOPY, RETROGRADES, URETEROSCOPY, INSERT STENT;;  Surgeon: Stuart King MD;  [facility]     COMBINED CYSTOSCOPY, RETROGRADES, URETEROSCOPY, INSERT STENT Bilateral 9/10/2018    Procedure: COMBINED CYSTOSCOPY, RETROGRADES, URETEROSCOPY, INSERT STENT;  Cystoscopy, Bilateral Ureteroscopy, Bladder Biopsies, Retrogram Pyelograms, Ureteral Washings and brushings, cysview;  Surgeon: Stuart King MD;  [facility]     COMBINED CYSTOSCOPY, RETROGRADES, URETEROSCOPY, INSERT STENT Left 8/26/2020    Procedure: Cystoscopy, Left Ureteral Wash, Left ureteral Retrograde Pyelogram;  Surgeon: Justin Henry MD;  [facility]     CYSTOSCOPY, BIOPSY BLADDER INSTILL OPTICAL AGENT N/A 4/3/2018    Procedure: CYSTOSCOPY, BIOPSY BLADDER INSTILL OPTICAL AGENT;  Cystoscopy, Blue Light Cystoscopy, Bladder Biopsies, Bilateral Selective ureteral washings for Cytology, Bilateral Retrograde Pyelograms, Bilateral Ureteroscopy;  Surgeon: Stuart King MD;  [facility]     CYSTOSCOPY, BIOPSY BLADDER, COMBINED N/A 2/19/2018    Procedure: COMBINED CYSTOSCOPY, BIOPSY BLADDER;  Cystoscopy, Bladder Biopsy;  Surgeon: Kenna La MD;   [facility]     CYSTOSCOPY, BIOPSY BLADDER, COMBINED N/A 8/26/2020    Procedure: Cystoscopy, biopsy bladder, combined;  Surgeon: Justin Henry MD;  [facility]     CYSTOSCOPY, FULGURATE BLADDER TUMOR, COMBINED N/A 1/13/2020    Procedure: CYSTOSCOPY, WITH  bladder biopsies and fulguration;  Surgeon: Stuart King MD;  [facility]     CYSTOSCOPY, REMOVE STENT(S), COMBINED  11/13/2018    Procedure: Flexible Cystoscopy with Stent Removal;  Surgeon: Stuart King MD;  [facility]     DAVINCI LYMPHADENECTOMY N/A 11/13/2018    Procedure: Davinci Lymphadenectomy ;  Surgeon: Stuart King MD;  [facility]     DAVINCI NEPHROURETERECTOMY N/A 11/13/2018    Procedure: Right DaVinci Assisted Nephroureterectomy;  Surgeon: Stuart King MD;  [facility]     ENDOSCOPIC ULTRASOUND LOWER GASTROINTESTIONAL TRACT (GI) N/A 10/30/2015    Procedure: ENDOSCOPIC ULTRASOUND LOWER GASTROINTESTIONAL TRACT (GI);  Surgeon: Daniel Jean-Baptiste MD;  [facility]     EYE SURGERY  12/4/17     INSERT PORT VASCULAR ACCESS Right 12/19/2018    Procedure: Chest Port Placement - right;  Surgeon: Stuart Chavez PA-C;  [facility]     IR CHEST PORT PLACEMENT > 5 YRS OF AGE  12/19/2018     IR PORT REMOVAL RIGHT  6/26/2019     LAPAROSCOPIC CHOLECYSTECTOMY WITH CHOLANGIOGRAMS N/A 11/1/2015    Procedure: LAPAROSCOPIC CHOLECYSTECTOMY WITH CHOLANGIOGRAMS;  Surgeon: Tonie Warren MD;  [facility]     REMOVE PORT VASCULAR ACCESS Right 6/26/2019    Procedure: Right Port Removal;  Surgeon: Froilan Irizarry PA-C;  [facility]     SURGICAL HISTORY OF -   1996    malignant melanoma     SURGICAL HISTORY OF -   1968    thyroid nodule     SURGICAL HISTORY OF -       D & C     ZZC NONSPECIFIC PROCEDURE  2005    colonoscopy polyp repeat 2010     SOCIAL HISTORY:   Social History     Tobacco Use     Smoking status: Never Smoker     Smokeless tobacco: Never  Used   Substance Use Topics     Alcohol use: No     Alcohol/week: 0.0 standard drinks     Comment: rare     Drug use: No     FAMILY HISTORY:   Family History   Problem Relation Age of Onset     Cancer Father         dec - esophageal and laryngeal     Heart Disease Mother      Respiratory Mother         dec     Breast Cancer Daughter      Other Cancer Daughter      Thyroid Disease Daughter      Asthma Daughter      Hyperlipidemia Son      Diabetes Son      Anesthesia Reaction No family hx of      Deep Vein Thrombosis (DVT) No family hx of      ALLERGIES:   Allergies   Allergen Reactions     Codeine      CURRENT MEDICATIONS:   Current Outpatient Medications:      acetaminophen (TYLENOL) 325 MG tablet, Take 3 tablets (975 mg) by mouth every 8 hours as needed for pain, Disp: 1 Bottle, Rfl: 0     alendronate (FOSAMAX) 70 MG tablet, TAKE 1 TABLET EVERY 7 DAYS AT LEAST 60 MINUTES BEFORE BREAKFAST AS DIRECTED., Disp: 12 tablet, Rfl: 3     calcium carbonate-vitamin D (OS-SHREYA) 500-400 MG-UNIT tablet, Take 1 tablet by mouth daily, Disp: , Rfl:      cycloSPORINE (RESTASIS) 0.05 % ophthalmic emulsion, Place 1 drop into both eyes 2 times daily, Disp: , Rfl:      ferrous sulfate 325 (65 Fe) MG TBEC EC tablet, Take 325 mg by mouth every morning , Disp: , Rfl:      furosemide (LASIX) 20 MG tablet, TAKE 1 TABLET (20 MG) BY MOUTH EVERY MORNING, Disp: 90 tablet, Rfl: 3     hydrOXYzine (ATARAX) 10 MG tablet, Take 1 tablet (10 mg) by mouth every 6 hours as needed for itching or other (adjuvant pain), Disp: 10 tablet, Rfl: 0     irbesartan (AVAPRO) 300 MG tablet, TAKE 1 TABLET EVERY DAY, Disp: 90 tablet, Rfl: 3     levofloxacin (LEVAQUIN) 500 MG tablet, Take one tablet 6 hours after treatment and one tablet the following morning after treatment., Disp: 6 tablet, Rfl: 0     lovastatin (MEVACOR) 40 MG tablet, Take 1 tablet (40 mg) by mouth At Bedtime, Disp: 90 tablet, Rfl: 3     melatonin 5 MG tablet, Take 5 mg by mouth At Bedtime, Disp: ,  Rfl:      methimazole (TAPAZOLE) 5 MG tablet, Take 1 tablet (5 mg) by mouth daily, Disp: 90 tablet, Rfl: 3     Omega-3 Fatty Acids (FISH OIL) 500 MG CAPS, Take 1 capsule by mouth every morning , Disp: , Rfl:      omeprazole (PRILOSEC) 20 MG DR capsule, TAKE 1 CAPSULE EVERY DAY, Disp: 90 capsule, Rfl: 3     ondansetron (ZOFRAN-ODT) 4 MG ODT tab, Take 1 tablet (4 mg) by mouth every 6 hours as needed for nausea or vomiting, Disp: 20 tablet, Rfl: 0     oxyCODONE (ROXICODONE) 5 MG tablet, Take 1 tablet (5 mg) by mouth every 4 hours as needed for moderate to severe pain, Disp: 20 tablet, Rfl: 0     polyethylene glycol (MIRALAX) 17 g packet, Take 17 g by mouth daily, Disp: 10 packet, Rfl: 0     Probiotic Product (PROBIOTIC ADVANCED PO), Take 1 capsule by mouth every morning , Disp: , Rfl:      propranolol ER (INDERAL LA) 60 MG 24 hr capsule, TAKE 1 CAPSULE EVERY MORNING, Disp: 90 capsule, Rfl: 1     rOPINIRole (REQUIP) 0.25 MG tablet, Take 2 tablets (0.5 mg) by mouth every afternoon at 4 PM, and take 1 tablet (0.25 mg) by mouth at bedtime daily., Disp: , Rfl:      senna-docusate (SENOKOT-S/PERICOLACE) 8.6-50 MG tablet, Take 1-2 tablets by mouth 2 times daily as needed for constipation, Disp: 30 tablet, Rfl: 0     sertraline (ZOLOFT) 100 MG tablet, TAKE 1 TABLET (100 MG) BY MOUTH EVERY MORNING, Disp: 90 tablet, Rfl: 2     traZODone (DESYREL) 50 MG tablet, TAKE 1/2 TABLET AT BEDTIME, Disp: 45 tablet, Rfl: 2    PHYSICAL EXAMINATION:  Deferred. Phone visit due to COVID.    LABORATORY DATA:   Lab Test 01/12/21  1230 11/11/20  0617 11/10/20  1536 11/10/20  0654 10/28/20  1244 10/28/20  1244 09/03/20  1213 08/10/20  1017 08/10/20  1017 07/20/20  1828   WBC 6.2  --   --   --   --  6.4 11.2*  --  6.2 7.1   RBC 3.79*  --   --   --   --  4.14 4.08  --  4.11 4.04   HGB 11.7 9.9*  --  10.3*  --  13.0 13.0  --  13.1 12.9   HCT 38.0  --   --   --   --  40.4 40.2  --  41.0 39.8     --   --   --   --  98 99  --  100 99   MCH 30.9   --   --   --   --  31.4 31.9  --  31.9 31.9   MCHC 30.8*  --   --   --   --  32.2 32.3  --  32.0 32.4   RDW 12.9  --   --   --   --  13.2 12.8  --  12.9 12.6     --   --   --   --  218 224  --  231 224   NEUTROPHIL 67.6  --   --   --   --   --  77.6  --   --  64.3    134 130* 133  --   --  138  --  138 134   POTASSIUM 5.6* 4.2 3.8 4.2   < >  --  4.4   < > 4.2 4.8   CHLORIDE 100 105 100 101  --   --  104  --  105 101   CO2 31 25 23 28  --   --  30  --  28 30   ANIONGAP 2* 4 7 4  --   --  4  --  5 3   * 89 140* 99   < >  --  93   < > 99 90   BUN 22 16 20 21  --   --  23  --  19 25   CR 0.97 1.06* 1.08* 1.25*   < >  --  1.03  --  1.05* 0.98   SHREYA 8.6 7.7* 7.3* 8.1*  --   --  9.0  --  9.2 8.8   PROTTOTAL 6.8  --   --   --   --   --  7.5  --  7.6 7.6   ALBUMIN 3.2*  --   --   --   --   --  3.6  --  3.4 3.7   BILITOTAL 0.4  --   --   --   --   --  0.4  --  0.5 0.5   ALKPHOS 91  --   --   --   --   --  97  --  91 90   AST 16  --   --   --   --   --  16  --  16 30   ALT 18  --   --   --   --   --  20  --  21 21    < > = values in this interval not displayed.     IMAGING STUDIES:  As above.     ASSESSMENT AND PLAN: Ms. 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    1. Upper tract urothelial cancer:   - She completed 4 cycles of adjuvant carboplatin plus gemcitabine chemotherapy per POUT trial. No residual side effects or evidence of recurrence.  - Reviewed restaging CT scan from Jan 2021 that showed no clear evidence of disease recurrence.   - Lung findings are non-specific and could be due to heart failure. No clinical evidence of disease recurrence.  - Continue active surveillance. She's aware that this involves close monitoring for new or worsening signs or symptoms such as shortness of breath,  chest pain, abdominal pain, unexplained weight loss, hematuria etc.  - Repeat CT chest without contrast in 2 months to ensure stability. Subsequent restaging CT C/A/P with contrast will revert to every 4 months (next in May 2021).    2. High grade urothelial carcinoma in situ:  - Bx proven carcinoma in situ in 2020, completed the first round of intravesical BCG treatment 2020-3/2020 and second in 2020-2020.  - CT findings of bladder inflammation consistent with recent BCG treatment. No new clinical signs/symptoms.  - Urine cytology positive again in end Dec 2020. Dr. Henry managing. Could be a candidate for mitogel v nivo+TRINI clinical trial (PI: Joseph Dunn MD).      3. Chemo induced anemia and thrombocytopenia:  - Resolved.     4. Mild hyperkalemia:  - Appears asymptomatic. Advised on dietary interventions.   - Advised to get rechecked in ~2 weeks with PCP.    Return to clinic in 2 months with restaging CT chest only.     All of the above was explained to the patient in lay language and several questions were answered. They are in agreement with the plan.    BILLIN - phone visit duration - 22 mins    Jaden Toledo M.D.  . Professor of Medicine  Genitourinary Oncology  Division of Hematology, Oncology & Transplantation  [facility]

## 2021-01-13 NOTE — PROGRESS NOTES
Dimple is a 87 year old who is being evaluated via a billable telephone visit.      What phone number would you like to be contacted at? [phone]  How would you like to obtain your AVS? Mail a copy  Phone visit duration: 22 minutes  MD Bethany Portillo RMA

## 2021-01-13 NOTE — PROGRESS NOTES
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    HISTORY OF PRESENT ILLNESS:  Ms. 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 Her oncologic history is as under.    Ms. Oviedo is doing well overall. Unable to play cards due to COVID-19, but trying to stay active. Underwent L knee arthroplasty for OA in Nov 2020. Recovering well but has chronic lymphedema. Taking diuretic and followed by cardiology for heart failure. She denies fever, chills, overt shortness of breath or chest pain.    There is no clinical evidence of disease progression. Urinary (urge) incontinence x 2.5 years stable and signs/symptoms of UTI. No chest issues but has h/o afib and chronic lymphedema; taking diuretics.    She follows with Dr. Henry in urology on 7/24/20 and last cystoscopy was on the same day. It was negative. However, urine cytology was positive for urothelial carcinoma in Dec 2020 after second course of BCG. Due to follow-up soon.      ONCOLOGIC HISTORY:  1. 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   - Jan 2018 to September 2018- Persistent gross hematuria and underwent multiple procedures.    - 2/19/18 and 4/3/18- cystoscopies with bladder biopsies  which were negative for bladder tumor  - 1/17/18, 3/8/18, 7/26/18, 9/10/18- Multiple urine cytologies have come back positive by FISH for high-grade transitional cell carcinoma  - 9/10/18- Underwent a bilateral cystoureteroscopy with biopsy and brushing that showed  right upper tract cytology suspicious for high-grade urothelial ca. Right ureter brushing negative from that day. Left upper tract  "negative.  - 9/10/18- CT A/P without contrast showed new small bilateral pleural effusions and interstitial pulmonary edema but no evidence of locoregional or metastatic disease.  - 9/12/18- Presented to ED with oliguria, CRESENCIO, and mild hydronephrosis bilaterally on CT scan and underwent bilateral ureteral stent placment  - 11/13/2018- Right robotic nephroureterectomy, excision of ana-caval mass and LN excision by Stuart King. Pathology showed \"Right nephroureterectomy: Invasive high grade urothelial carcinoma measuring 3.5 cm, located in renal pelvis and invading through renal parenchyma into perinephric fat. Urothelial carcinoma in-situ present. Margins free of tumor. Ana-caval mass: Metastatic urothelial carcinoma involving one lymph node with at least 1 cm tumor deposit. Pericaval Extranodal Extension present. Five additional benign pericaval lymph nodes. Pathologic stage: pT4N1 (1 of 6 lymph nodes).\"  - 12/11/18- PET/CT whole body demonstrated significant residual FDG avid disease. For example, \"there is an FDG avid ill-defined pericaval mass immediately medial to the surgical clips measuring approximately 2.0 x 1.4 cm, with max SUV measuring up to12.2, see series 4 image 21. Additional FDG avid retrocaval and interaortocaval lymphadenopathy are noted.There is a 1.2 cm nodule in the right hemipelvis posterior lateral tothe bladder with max SUV measuring 8.3, see series 4 image 77. The bladder is incompletely distended.\" No suspicious thoracic or bone uptake.  - 12/12/18- Started adjuvant chemotherapy (carbo+gem) per POUT trial. Cycle 2 - 1/2/19. Cycle 3 - 1/23/19. Cycle 4 - 02/13/19.  - 1/22/19 - CT C/A/P with contrast compared with prior PET/CT: \"1. New well-circumscribed soft tissue pulmonary nodules of the right lower lobe and left upper lobe. Findings could be infectious, inflammatory, or represent metastatic disease. Continued attention on follow-up recommended. Postoperative changes of right " "nephrectomy. No evidence for local recurrence in the present study.\"  - 3/1/2019- CT C/A/P with contrast - \"1. Bilateral pulmonary nodules measuring up to 4 mm in the right lower lobe, previously measuring 8 mm. No new or enlarging pulmonary nodules. 2. No convincing evidence for metastatic disease in the abdomen, pelvis and bones.\"  - 6/3/2019- CT C/A/P with contrast - \"1. Surgical changes of right nephrectomy without evidence of residual or recurrent disease on this noncontrast exam.  Consider contrast enhanced examination on follow-up. 2. No evidence of metastatic disease in the abdomen, or pelvis on noncontrast CT.  Scattered tiny pulmonary nodules in the chest are not significantly changed.  Previously described prominent right lower lobe pulmonary nodule (previously measuring up to 8 mm) is no longer visualized. 3. Stable bilateral pulmonary nodules measuring maximally 4 mm.\"  - 09/11/19: CT C/A/P without contrast - \"1. Stable exam without evidence convincing for new disease. 2. Stable pulmonary nodules. 3. 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 4. Stable heterogeneous enlargement of the thyroid with underlying nodules suggested.\"  - 12/4/19: CT C/A/P without contrast - \"No acute change since prior exam. No evidence of recurrent or metastatic disease. Tiny lung nodules are stable. Prior right nephrectomy. Left renal artery aneurysm is stable.\"  - 04/08/20, 7/27/20: CT C/A/P with contrast - GABRIELLE.  - 01/12/21: CT C/A/P with contrast - \"1.  Slight increased size of a 6 mm left upper lobe nodule and new 4 mm linear opacity along the right major fissure. These are indeterminate but could represent progression of metastatic disease. 2.  Slight increased size of mildly enlarged mediastinal and hilar lymph nodes. 3.  Mild pulmonary edema. 4.  No recurrent or metastatic disease in the abdomen and pelvis. 5.  Mild stranding around the urinary bladder dome may be related to cystitis. Punctate focus of " "gas within the urinary bladder either secondary to infection or instrumentation. 6.  Enlarged multinodular thyroid gland.\"    2. Non-muscle invasive bladder cancer:  - 10/3/19: Cystoscopy showed a small area of erythema on retroflexion, possibly pre-existing or due to retroflexion of the scope. Urine cytology from that time showed cells suspicious for malignancy.   - 1/13/20: Bladder biopsy showed high grade urothelial carcinoma in-situ  - 2/12/20-3/18/20: Intravesical BCG therapy  - 7/24/20 and last cystoscopy was on the same day. It was negative. However, urine cytology was positive for high-grade urothelial carcinoma.   - 11/25/20-12/10/2020: 3 weekly doses of intravesical BCG 50mg.   - 12/10/20: Cytology suspicious and FISH urovysion positive for TCC.    REVIEW OF SYSTEMS: 14 point ROS negative other than the symptoms noted above in the HPI.    PAST MEDICAL AND SURGICAL HISTORY:   Past Medical History:   Diagnosis Date     Calculus of kidney      Chemotherapy-induced neutropenia (H) 3/6/2019     Esophageal reflux      GERD (gastroesophageal reflux disease)      Hyperlipidemia LDL goal <130 5/9/2010     Malignant melanoma of skin of trunk, except scrotum (H)      Nonspecific abnormal finding     has living will 2004 -      Nontoxic multinodular goiter     no further eval /tx rec per pt     Osteopenia      Other psoriasis      Personal history of colonic polyps      PMR (polymyalgia rheumatica) (H)      Stress-induced cardiomyopathy      Undiagnosed cardiac murmurs      Unspecified constipation      Unspecified essential hypertension      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     Past Surgical History:   Procedure Laterality Date     ARTHROPLASTY KNEE Right 11/9/2020    Procedure: ARTHROPLASTY, KNEE, TOTAL, RIGHT;  Surgeon: Edward Otero MD;  [facility]     BIOPSY       COLONOSCOPY  2014     COMBINED CYSTOSCOPY, INSERT STENT URETER(S) Bilateral 9/12/2018    Procedure: COMBINED CYSTOSCOPY, INSERT STENT " URETER(S);  Cystoscopy Bilateral ureteral Stent Placement.;  Surgeon: Justin Henry MD;  [facility]     COMBINED CYSTOSCOPY, RETROGRADES, URETEROSCOPY, INSERT STENT Bilateral 4/3/2018    Procedure: COMBINED CYSTOSCOPY, RETROGRADES, URETEROSCOPY, INSERT STENT;;  Surgeon: Stuart King MD;  [facility]     COMBINED CYSTOSCOPY, RETROGRADES, URETEROSCOPY, INSERT STENT Bilateral 9/10/2018    Procedure: COMBINED CYSTOSCOPY, RETROGRADES, URETEROSCOPY, INSERT STENT;  Cystoscopy, Bilateral Ureteroscopy, Bladder Biopsies, Retrogram Pyelograms, Ureteral Washings and brushings, cysview;  Surgeon: Stuart King MD;  [facility]     COMBINED CYSTOSCOPY, RETROGRADES, URETEROSCOPY, INSERT STENT Left 8/26/2020    Procedure: Cystoscopy, Left Ureteral Wash, Left ureteral Retrograde Pyelogram;  Surgeon: Justin Henry MD;  [facility]     CYSTOSCOPY, BIOPSY BLADDER INSTILL OPTICAL AGENT N/A 4/3/2018    Procedure: CYSTOSCOPY, BIOPSY BLADDER INSTILL OPTICAL AGENT;  Cystoscopy, Blue Light Cystoscopy, Bladder Biopsies, Bilateral Selective ureteral washings for Cytology, Bilateral Retrograde Pyelograms, Bilateral Ureteroscopy;  Surgeon: Stuart King MD;  [facility]     CYSTOSCOPY, BIOPSY BLADDER, COMBINED N/A 2/19/2018    Procedure: COMBINED CYSTOSCOPY, BIOPSY BLADDER;  Cystoscopy, Bladder Biopsy;  Surgeon: Kenna La MD;  [facility]     CYSTOSCOPY, BIOPSY BLADDER, COMBINED N/A 8/26/2020    Procedure: Cystoscopy, biopsy bladder, combined;  Surgeon: Justin Henry MD;  [facility]     CYSTOSCOPY, FULGURATE BLADDER TUMOR, COMBINED N/A 1/13/2020    Procedure: CYSTOSCOPY, WITH  bladder biopsies and fulguration;  Surgeon: Stuart King MD;  [facility]     CYSTOSCOPY, REMOVE STENT(S), COMBINED  11/13/2018    Procedure: Flexible Cystoscopy with Stent Removal;  Surgeon: Stuart King MD;  [facility]     [facility]  LYMPHAD[facility]ECTOMY N/A 11/13/2018    Procedure: Davinci Lymphadenectomy ;  Surgeon: Stuart King MD;  [facility]     DAVINCI NEPHROURETERECTOMY N/A 11/13/2018    Procedure: Right DaVinci Assisted Nephroureterectomy;  Surgeon: Stuart King MD;  [facility]     ENDOSCOPIC ULTRASOUND LOWER GASTROINTESTIONAL TRACT (GI) N/A 10/30/2015    Procedure: ENDOSCOPIC ULTRASOUND LOWER GASTROINTESTIONAL TRACT (GI);  Surgeon: Daniel Jean-Baptiste MD;  [facility]     EYE SURGERY  12/4/17     INSERT PORT VASCULAR ACCESS Right 12/19/2018    Procedure: Chest Port Placement - right;  Surgeon: Stuart Chavez PA-C;  [facility]     IR CHEST PORT PLACEMENT > 5 YRS OF AGE  12/19/2018     IR PORT REMOVAL RIGHT  6/26/2019     LAPAROSCOPIC CHOLECYSTECTOMY WITH CHOLANGIOGRAMS N/A 11/1/2015    Procedure: LAPAROSCOPIC CHOLECYSTECTOMY WITH CHOLANGIOGRAMS;  Surgeon: Tonie Warren MD;  [facility]     REMOVE PORT VASCULAR ACCESS Right 6/26/2019    Procedure: Right Port Removal;  Surgeon: Froilan Irizarry PA-C;  [facility]     SURGICAL HISTORY OF -   1996    malignant melanoma     SURGICAL HISTORY OF -   1968    thyroid nodule     SURGICAL HISTORY OF -       D & C     ZZC NONSPECIFIC PROCEDURE  2005    colonoscopy polyp repeat 2010     SOCIAL HISTORY:   Social History     Tobacco Use     Smoking status: Never Smoker     Smokeless tobacco: Never Used   Substance Use Topics     Alcohol use: No     Alcohol/week: 0.0 standard drinks     Comment: rare     Drug use: No     FAMILY HISTORY:   Family History   Problem Relation Age of Onset     Cancer Father         dec - esophageal and laryngeal     Heart Disease Mother      Respiratory Mother         dec     Breast Cancer Daughter      Other Cancer Daughter      Thyroid Disease Daughter      Asthma Daughter      Hyperlipidemia Son      Diabetes Son      Anesthesia Reaction No family hx of      Deep Vein Thrombosis (DVT) No family hx  of      ALLERGIES:   Allergies   Allergen Reactions     Codeine      CURRENT MEDICATIONS:   Current Outpatient Medications:      acetaminophen (TYLENOL) 325 MG tablet, Take 3 tablets (975 mg) by mouth every 8 hours as needed for pain, Disp: 1 Bottle, Rfl: 0     alendronate (FOSAMAX) 70 MG tablet, TAKE 1 TABLET EVERY 7 DAYS AT LEAST 60 MINUTES BEFORE BREAKFAST AS DIRECTED., Disp: 12 tablet, Rfl: 3     calcium carbonate-vitamin D (OS-SHREYA) 500-400 MG-UNIT tablet, Take 1 tablet by mouth daily, Disp: , Rfl:      cycloSPORINE (RESTASIS) 0.05 % ophthalmic emulsion, Place 1 drop into both eyes 2 times daily, Disp: , Rfl:      ferrous sulfate 325 (65 Fe) MG TBEC EC tablet, Take 325 mg by mouth every morning , Disp: , Rfl:      furosemide (LASIX) 20 MG tablet, TAKE 1 TABLET (20 MG) BY MOUTH EVERY MORNING, Disp: 90 tablet, Rfl: 3     hydrOXYzine (ATARAX) 10 MG tablet, Take 1 tablet (10 mg) by mouth every 6 hours as needed for itching or other (adjuvant pain), Disp: 10 tablet, Rfl: 0     irbesartan (AVAPRO) 300 MG tablet, TAKE 1 TABLET EVERY DAY, Disp: 90 tablet, Rfl: 3     levofloxacin (LEVAQUIN) 500 MG tablet, Take one tablet 6 hours after treatment and one tablet the following morning after treatment., Disp: 6 tablet, Rfl: 0     lovastatin (MEVACOR) 40 MG tablet, Take 1 tablet (40 mg) by mouth At Bedtime, Disp: 90 tablet, Rfl: 3     melatonin 5 MG tablet, Take 5 mg by mouth At Bedtime, Disp: , Rfl:      methimazole (TAPAZOLE) 5 MG tablet, Take 1 tablet (5 mg) by mouth daily, Disp: 90 tablet, Rfl: 3     Omega-3 Fatty Acids (FISH OIL) 500 MG CAPS, Take 1 capsule by mouth every morning , Disp: , Rfl:      omeprazole (PRILOSEC) 20 MG DR capsule, TAKE 1 CAPSULE EVERY DAY, Disp: 90 capsule, Rfl: 3     ondansetron (ZOFRAN-ODT) 4 MG ODT tab, Take 1 tablet (4 mg) by mouth every 6 hours as needed for nausea or vomiting, Disp: 20 tablet, Rfl: 0     oxyCODONE (ROXICODONE) 5 MG tablet, Take 1 tablet (5 mg) by mouth every 4 hours as  needed for moderate to severe pain, Disp: 20 tablet, Rfl: 0     polyethylene glycol (MIRALAX) 17 g packet, Take 17 g by mouth daily, Disp: 10 packet, Rfl: 0     Probiotic Product (PROBIOTIC ADVANCED PO), Take 1 capsule by mouth every morning , Disp: , Rfl:      propranolol ER (INDERAL LA) 60 MG 24 hr capsule, TAKE 1 CAPSULE EVERY MORNING, Disp: 90 capsule, Rfl: 1     rOPINIRole (REQUIP) 0.25 MG tablet, Take 2 tablets (0.5 mg) by mouth every afternoon at 4 PM, and take 1 tablet (0.25 mg) by mouth at bedtime daily., Disp: , Rfl:      senna-docusate (SENOKOT-S/PERICOLACE) 8.6-50 MG tablet, Take 1-2 tablets by mouth 2 times daily as needed for constipation, Disp: 30 tablet, Rfl: 0     sertraline (ZOLOFT) 100 MG tablet, TAKE 1 TABLET (100 MG) BY MOUTH EVERY MORNING, Disp: 90 tablet, Rfl: 2     traZODone (DESYREL) 50 MG tablet, TAKE 1/2 TABLET AT BEDTIME, Disp: 45 tablet, Rfl: 2    PHYSICAL EXAMINATION:  Deferred. Phone visit due to COVID.    LABORATORY DATA:   Lab Test 01/12/21  1230 11/11/20  0617 11/10/20  1536 11/10/20  0654 10/28/20  1244 10/28/20  1244 09/03/20  1213 08/10/20  1017 08/10/20  1017 07/20/20  1828   WBC 6.2  --   --   --   --  6.4 11.2*  --  6.2 7.1   RBC 3.79*  --   --   --   --  4.14 4.08  --  4.11 4.04   HGB 11.7 9.9*  --  10.3*  --  13.0 13.0  --  13.1 12.9   HCT 38.0  --   --   --   --  40.4 40.2  --  41.0 39.8     --   --   --   --  98 99  --  100 99   MCH 30.9  --   --   --   --  31.4 31.9  --  31.9 31.9   MCHC 30.8*  --   --   --   --  32.2 32.3  --  32.0 32.4   RDW 12.9  --   --   --   --  13.2 12.8  --  12.9 12.6     --   --   --   --  218 224  --  231 224   NEUTROPHIL 67.6  --   --   --   --   --  77.6  --   --  64.3    134 130* 133  --   --  138  --  138 134   POTASSIUM 5.6* 4.2 3.8 4.2   < >  --  4.4   < > 4.2 4.8   CHLORIDE 100 105 100 101  --   --  104  --  105 101   CO2 31 25 23 28  --   --  30  --  28 30   ANIONGAP 2* 4 7 4  --   --  4  --  5 3   * 89 140* 99    < >  --  93   < > 99 90   BUN 22 16 20 21  --   --  23  --  19 25   CR 0.97 1.06* 1.08* 1.25*   < >  --  1.03  --  1.05* 0.98   SHREYA 8.6 7.7* 7.3* 8.1*  --   --  9.0  --  9.2 8.8   PROTTOTAL 6.8  --   --   --   --   --  7.5  --  7.6 7.6   ALBUMIN 3.2*  --   --   --   --   --  3.6  --  3.4 3.7   BILITOTAL 0.4  --   --   --   --   --  0.4  --  0.5 0.5   ALKPHOS 91  --   --   --   --   --  97  --  91 90   AST 16  --   --   --   --   --  16  --  16 30   ALT 18  --   --   --   --   --  20  --  21 21    < > = values in this interval not displayed.     IMAGING STUDIES:  As above.     ASSESSMENT AND PLAN: Ms. 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    1. Upper tract urothelial cancer:   - She completed 4 cycles of adjuvant carboplatin plus gemcitabine chemotherapy per POUT trial. No residual side effects or evidence of recurrence.  - Reviewed restaging CT scan from Jan 2021 that showed no clear evidence of disease recurrence.   - Lung findings are non-specific and could be due to heart failure. No clinical evidence of disease recurrence.  - Continue active surveillance. She's aware that this involves close monitoring for new or worsening signs or symptoms such as shortness of breath, chest pain, abdominal pain, unexplained weight loss, hematuria etc.  - Repeat CT chest without contrast in 2 months to ensure stability. Subsequent restaging CT C/A/P with contrast will revert to every 4 months (next in May 2021).    2. High grade urothelial carcinoma in situ:  - Bx proven carcinoma in situ in 1/2020, completed the first round of intravesical BCG treatment 2/2020-3/2020 and second in 11/2020-12/2020.  - CT findings of bladder inflammation consistent with recent BCG treatment. No new clinical signs/symptoms.  - Urine  cytology positive again in end Dec 2020. Dr. Henry managing. Could be a candidate for mitogel v nivo+TRINI clinical trial (PI: Joseph Dunn MD).      3. Chemo induced anemia and thrombocytopenia:  - Resolved.     4. Mild hyperkalemia:  - Appears asymptomatic. Advised on dietary interventions.   - Advised to get rechecked in ~2 weeks with PCP.    Return to clinic in 2 months with restaging CT chest only.     All of the above was explained to the patient in lay language and several questions were answered. They are in agreement with the plan.    BILLIN - phone visit duration - 22 mins    Jaden Toledo M.D.  Asst. Professor of Medicine  Genitourinary Oncology  Division of Hematology, Oncology & Transplantation  [facility]

## 2021-01-18 DIAGNOSIS — I10 ESSENTIAL HYPERTENSION WITH GOAL BLOOD PRESSURE LESS THAN 140/90: ICD-10-CM

## 2021-01-18 DIAGNOSIS — E04.2 NONTOXIC MULTINODULAR GOITER: ICD-10-CM

## 2021-01-18 RX ORDER — PROPRANOLOL HCL 60 MG
CAPSULE, EXTENDED RELEASE 24HR ORAL
Qty: 30 CAPSULE | Refills: 0 | Status: SHIPPED | OUTPATIENT
Start: 2021-01-18 | End: 2021-02-11

## 2021-01-18 RX ORDER — IRBESARTAN 300 MG/1
TABLET ORAL
Qty: 30 TABLET | Refills: 0 | Status: SHIPPED | OUTPATIENT
Start: 2021-01-18 | End: 2021-02-11

## 2021-01-18 NOTE — TELEPHONE ENCOUNTER
Patient is going to be out of these by end of week please process asap to mail order pharmacy    Propranolol ER  Irbersartan

## 2021-01-22 ENCOUNTER — OFFICE VISIT (OUTPATIENT)
Dept: UROLOGY | Facility: CLINIC | Age: 86
End: 2021-01-22
Payer: MEDICARE

## 2021-01-22 VITALS
BODY MASS INDEX: 35.6 KG/M2 | HEART RATE: 69 BPM | DIASTOLIC BLOOD PRESSURE: 73 MMHG | SYSTOLIC BLOOD PRESSURE: 154 MMHG | WEIGHT: 165 LBS | HEIGHT: 57 IN

## 2021-01-22 DIAGNOSIS — C67.8 MALIGNANT NEOPLASM OF OVERLAPPING SITES OF BLADDER (H): Primary | ICD-10-CM

## 2021-01-22 PROCEDURE — 52000 CYSTOURETHROSCOPY: CPT | Performed by: UROLOGY

## 2021-01-22 PROCEDURE — 88112 CYTOPATH CELL ENHANCE TECH: CPT | Mod: GC | Performed by: PATHOLOGY

## 2021-01-22 RX ORDER — LIDOCAINE HYDROCHLORIDE 20 MG/ML
10 JELLY TOPICAL ONCE
Status: COMPLETED | OUTPATIENT
Start: 2021-01-22 | End: 2021-01-22

## 2021-01-22 RX ADMIN — LIDOCAINE HYDROCHLORIDE 10 ML: 20 JELLY TOPICAL at 09:27

## 2021-01-22 ASSESSMENT — MIFFLIN-ST. JEOR: SCORE: 1057.32

## 2021-01-22 ASSESSMENT — PAIN SCALES - GENERAL: PAINLEVEL: NO PAIN (0)

## 2021-01-22 NOTE — PROGRESS NOTES
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 She completed chemotherapy (gem/carbo).  Stable to no evidence of disease in the upper tracts.     2. 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 Got 6 of BCG and tolerated it well. Negative biopsies 2020, 20.       Plan for 3 weekly maintenance BCG treatments at 3, 6, 12, 18, 24, 30, and 36 months.  Now at 9 months from 2020 2nd round of BCG.     Waiting on getting her knee surgery for the BCG to be completed.  I will notify Dr Otero that she can proceed with knee surgery pending cytology today.    Plan  -12 month maintenance bcg in 2021  -return to clinic 3 months for cystoscopy, likely just before Maintenance BCG.    ______________________________________________________________________     HPI  Dimple Oviedo is a 86 year old female with a history of high grade upper tract urothelial cancer (pT4N1) here for follow up after right nephroureterectomy on 18. The patient is following yolanda Toledo of Hematology and Oncology.      Per Dr. Toledo's note, on 18 Mr. Oviedo- Started adjuvant chemotherapy (carbo+gem) per POUT trial. Cycle 2 - 19. Cycle 3 - 19. Cycle 4 - 19.     Date of last abdominal and pelvis imagin2019   Date of last chest imagin2019   Any recurrence? cystoscopy on 10/03/2019 showed small area of erythema. Bladder biopsy from 2020 showed urothelial carcinoma in-situ , negative biopsy 2020  Intravesical Therapy: BCG X6 2020 (Full Strength)     She has right total knee arthoplasty planned for 2020. She has knee pain but is not crippled by the pain.  This was cancelled to get the BCG done, now on hold for COVID    Today she is doing well.     Review  of Systems:   Pertinent items are noted in HPI or as below, remainder of complete ROS is negative.       Telephone Visit     Laboratory:   I reviewed all applicable laboratory and pathology data and went over findings with patient  Significant for            Lab Results   Component Value Date     CR 1.19 01/10/2020     CR 1.09 12/04/2019     CR 0.96 10/04/2019     CR 1.03 09/11/2019     CR 0.99 06/12/2019     CR 1.02 03/06/2019     CR 1.00 02/13/2019     CR 1.18 02/11/2019     CR 0.98 02/07/2019     CR 0.99 02/03/2019      Imaging:   I personally viewed all applicable images, interpreted them, and discussed findings with the patient.      CT 4/2020  IMPRESSION:   1. No evidence of recurrent/metastatic disease in the chest, abdomen,  or pelvis.   2. Stable surgical changes of right nephrectomy/ureterectomy.  3. Unchanged left renal artery aneurysm.  4. Unchanged nonobstructing calyceal stone in the left kidney.  5. Moderate hiatal hernia.    CYSTOSCOPY PROCEDURE:  Sterile preparation and drape and an informed consent were performed.  A 16-[place] flexible cystoscope was introduced via the urethra.  The urethra was open.  The bladder mucosa showed no evidence of any lesions or trabeculation.  There were no stones.

## 2021-01-22 NOTE — LETTER
"1/22/2021       RE: Dimple Oviedo  [address]  [facility] [address]     Dear Colleague,    Thank you for referring your patient, Dimple Oviedo, to the [facility] [place] at [facility]. Please see a copy of my visit note below.    Chief Complaint   Patient presents with     Cystoscopy     Urothelial cancer       Blood pressure (!) 154/73, pulse 69, height 1.448 m (4' 9\"), weight 74.8 kg (165 lb), not currently breastfeeding. Body mass index is 35.71 kg/m .    Patient Active Problem List   Diagnosis     Esophageal reflux     Restless leg syndrome     heat intolerance     Goiter     Disorder of bone and cartilage     Other psoriasis     perirectal cyst     Malignant melanoma of skin of trunk, except scrotum (H)     Nontoxic multinodular goiter     Hyperlipidemia LDL goal <130     Hypertension goal BP (blood pressure) < 140/90     Urinary incontinence     Hip arthritis     Polymyalgia rheumatica (H)     High risk medication use     Shoulder pain     Impaired fasting blood sugar     Chronic bilateral low back pain without sciatica     Obesity, unspecified obesity severity, unspecified obesity type     Iron deficiency anemia, unspecified iron deficiency anemia type     NSTEMI (non-ST elevated myocardial infarction) (H)     Stress-induced cardiomyopathy     A-fib (H)     Anxiety     Chronic systolic heart failure (H)     Urothelial carcinoma (H)     Hyperthyroidism     Restless legs syndrome     CRESENCIO (acute kidney injury) (H)     Hydronephrosis     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 12 tablet 3     calcium carbonate-vitamin D (OS-SHREYA) 500-400 MG-UNIT tablet Take 1 tablet by mouth daily       cycloSPORINE (RESTASIS) 0.05 % ophthalmic emulsion Place 1 drop into both eyes 2 times daily       ferrous sulfate 325 (65 Fe) MG TBEC EC tablet Take 325 mg by mouth every morning        furosemide (LASIX) 20 MG tablet TAKE 1 TABLET (20 MG) BY MOUTH EVERY MORNING 90 tablet 3     irbesartan (AVAPRO) 300 MG tablet TAKE 1 TABLET EVERY DAY 30 tablet 0     levofloxacin (LEVAQUIN) 500 MG tablet Take one tablet 6 hours after treatment and one tablet the following morning after treatment. 6 tablet 0     lovastatin (MEVACOR) 40 MG tablet Take 1 tablet (40 mg) by mouth At Bedtime 90 tablet 3     melatonin 5 MG tablet Take 5 mg by mouth At Bedtime       methimazole (TAPAZOLE) 5 MG tablet Take 1 tablet (5 mg) by mouth daily 90 tablet 3     Omega-3 Fatty Acids (FISH OIL) 500 MG CAPS Take 1 capsule by mouth every morning        omeprazole (PRILOSEC) 20 MG DR capsule TAKE 1 CAPSULE EVERY DAY 90 capsule 3     Probiotic Product (PROBIOTIC ADVANCED PO) Take 1 capsule by mouth every morning        propranolol ER (INDERAL LA) 60 MG 24 hr capsule TAKE 1 CAPSULE EVERY MORNING 30 capsule 0     rOPINIRole (REQUIP) 0.25 MG tablet Take 2 tablets (0.5 mg) by mouth every afternoon at 4 PM, and take 1 tablet (0.25 mg) by mouth at bedtime daily.       sertraline (ZOLOFT) 100 MG tablet TAKE 1 TABLET EVERY MORNING 90 tablet 0     traZODone (DESYREL) 50 MG tablet TAKE 1/2 TABLET AT BEDTIME 45 tablet 2     acetaminophen (TYLENOL) 325 MG tablet Take 3 tablets (975 mg) by mouth every 8 hours as needed for pain 1 Bottle 0     hydrOXYzine (ATARAX) 10 MG tablet Take 1 tablet (10 mg) by mouth every 6 hours as needed for itching or other (adjuvant pain) 10 tablet 0      ondansetron (ZOFRAN-ODT) 4 MG ODT tab Take 1 tablet (4 mg) by mouth every 6 hours as needed for nausea or vomiting 20 tablet 0     oxyCODONE (ROXICODONE) 5 MG tablet Take 1 tablet (5 mg) by mouth every 4 hours as needed for moderate to severe pain 20 tablet 0     polyethylene glycol (MIRALAX) 17 g packet Take 17 g by mouth daily 10 packet 0     senna-docusate (SENOKOT-S/PERICOLACE) 8.6-50 MG tablet Take 1-2 tablets by mouth 2 times daily as needed for constipation 30 tablet 0       Social History     Tobacco Use     Smoking status: Never Smoker     Smokeless tobacco: Never Used   Substance Use Topics     Alcohol use: No     Alcohol/week: 0.0 standard drinks     Comment: rare     Drug use: No       Invasive Procedure Safety Checklist:    Procedure: Cystoscopy    Action: Complete sections and checkboxes as appropriate.    Pre-procedure:  1. Patient ID Verified with 2 identifiers (Bettie and  or MRN) : YES    2. Procedure and site verified with patient/designee (when able) : YES    3. Accurate consent documentation in medical record : YES    4. H&P (or appropriate assessment) documented in medical record : N/A  H&P must be up to 30 days prior to procedure an updated within 24 hours of                 Procedure as applicable.     5. Relevant diagnostic and radiology test results appropriately labeled and displayed as applicable : YES    6. Blood products, implants, devices, and/or special equipment available for the procedure as applicable : YES    7. Procedure site(s) marked with provider initials [Exclusions: none] : NO    8. Marking not required. Reason : Yes  Procedure does not require site marking    Time Out:     Time-Out performed immediately prior to starting procedure, including verbal and active participation of all team members addressing: YES    1. Correct patient identity.  2. Confirmed that the correct side and site are marked.  3. An accurate procedure to be done.  4. Agreement on the procedure to be  done.  5. Correct patient position.  6. Relevant images and results are properly labeled and appropriately displayed.  7. The need to administer antibiotics or fluids for irrigation purposes during the procedure as applicable.  8. Safety precautions based on patient history or medication use.    During Procedure: Verification of correct person, site, and procedure occurs any time the responsibility for care of the patient is transferred to another member of the care team.    The following medication was given:     MEDICATION: Lidocaine Uro-Jet 2% 200mg (20mg/mL)  ROUTE: Urethral   SITE: Urethra   DOSE: 10mL  LOT #: [id number]  : [employer]   EXPIRATION DATE: 8/22  NDC#: [address]-05   Was there drug waste? No    Prior to injection, verified patient identity using patient's name and date of birth.  Due to injection administration, patient instructed to remain in clinic for 15 minutes  afterwards, and to report any adverse reaction to me immediately.    Drug Amount Wasted:  None.  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 She completed chemotherapy (gem/carbo).  Stable to no evidence of disease in the upper tracts.     2. 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 Got 6 of BCG and tolerated it well. Negative biopsies 4/2020, 8/26/20.       Plan for 3 weekly maintenance BCG treatments at 3, 6, 12, 18, 24, 30, and 36 months.  Now at 9 months from 4/2020 2nd round of BCG.     Waiting on getting her knee surgery for the BCG to be completed.  I will notify Dr Otero that she can proceed with knee surgery pending cytology today.    Plan  -12 month maintenance bcg in April 2021  -return to  clinic 3 months for cystoscopy, likely just before Maintenance BCG.    ______________________________________________________________________     HPI  Dimple Oviedo is a 86 year old female with a history of high grade upper tract urothelial cancer (pT4N1) here for follow up after right nephroureterectomy on 18. The patient is following yolanda Toledo of Hematology and Oncology.      Per Dr. Toledo's note, on 18 Mr. Oviedo- Started adjuvant chemotherapy (carbo+gem) per POUT trial. Cycle 2 - 19. Cycle 3 - 19. Cycle 4 - 19.     Date of last abdominal and pelvis imagin2019   Date of last chest imagin2019   Any recurrence? cystoscopy on 10/03/2019 showed small area of erythema. Bladder biopsy from 2020 showed urothelial carcinoma in-situ , negative biopsy 2020  Intravesical Therapy: BCG X6 2020 (Full Strength)     She has right total knee arthoplasty planned for 2020. She has knee pain but is not crippled by the pain.  This was cancelled to get the BCG done, now on hold for COVID    Today she is doing well.     Review of Systems:   Pertinent items are noted in HPI or as below, remainder of complete ROS is negative.       Telephone Visit     Laboratory:   I reviewed all applicable laboratory and pathology data and went over findings with patient  Significant for            Lab Results   Component Value Date     CR 1.19 01/10/2020     CR 1.09 2019     CR 0.96 10/04/2019     CR 1.03 2019     CR 0.99 2019     CR 1.02 2019     CR 1.00 2019     CR 1.18 2019     CR 0.98 2019     CR 0.99 2019      Imaging:   I personally viewed all applicable images, interpreted them, and discussed findings with the patient.      CT 2020  IMPRESSION:   1. No evidence of recurrent/metastatic disease in the chest, abdomen,  or pelvis.   2. Stable surgical changes of right nephrectomy/ureterectomy.  3. Unchanged left renal artery aneurysm.  4.  Unchanged nonobstructing calyceal stone in the left kidney.  5. Moderate hiatal hernia.    CYSTOSCOPY PROCEDURE:  Sterile preparation and drape and an informed consent were performed.  A 16-Malian flexible cystoscope was introduced via the urethra.  The urethra was open.  The bladder mucosa showed no evidence of any lesions or trabeculation.  There were no stones.

## 2021-01-22 NOTE — PATIENT INSTRUCTIONS
Please follow up in 3 month with a cystoscopy    It was a pleasure meeting with you today.  Thank you for allowing me and my team the privilege of caring for you today.  YOU are the reason we are here, and I truly hope we provided you with the excellent service you deserve.  Please let us know if there is anything else we can do for you so that we can be sure you are leaving completely satisfied with your care experience.

## 2021-01-22 NOTE — NURSING NOTE
Chief Complaint   Patient presents with     Cystoscopy     Urothelial cancer       Bel BARTHOLOMEW

## 2021-01-27 ENCOUNTER — NURSE TRIAGE (OUTPATIENT)
Dept: NURSING | Facility: CLINIC | Age: 86
End: 2021-01-27

## 2021-01-27 DIAGNOSIS — E87.5 HYPERKALEMIA: ICD-10-CM

## 2021-01-27 DIAGNOSIS — E87.5 HYPERKALEMIA: Primary | ICD-10-CM

## 2021-01-27 DIAGNOSIS — E05.00 GRAVES DISEASE: ICD-10-CM

## 2021-01-27 LAB — T3 SERPL-MCNC: 103 NG/DL (ref 60–181)

## 2021-01-27 PROCEDURE — 84445 ASSAY OF TSI GLOBULIN: CPT | Mod: 90 | Performed by: INTERNAL MEDICINE

## 2021-01-27 PROCEDURE — 99000 SPECIMEN HANDLING OFFICE-LAB: CPT | Performed by: INTERNAL MEDICINE

## 2021-01-27 PROCEDURE — 84480 ASSAY TRIIODOTHYRONINE (T3): CPT | Performed by: INTERNAL MEDICINE

## 2021-01-27 PROCEDURE — 84443 ASSAY THYROID STIM HORMONE: CPT | Performed by: INTERNAL MEDICINE

## 2021-01-27 PROCEDURE — 84439 ASSAY OF FREE THYROXINE: CPT | Performed by: INTERNAL MEDICINE

## 2021-01-27 PROCEDURE — 36415 COLL VENOUS BLD VENIPUNCTURE: CPT | Performed by: INTERNAL MEDICINE

## 2021-01-27 NOTE — TELEPHONE ENCOUNTER
Writer called patient and reviewed plan and message per Dr. Zapien.    Patient verbalized understanding and in agreement with plan.    Patient has lab appt this afternoon.  Note added to today's lab appt visit.    Discussed with patient to please inform lab staff both lab orders from Dr. Toledo and Dr. Zapien need to be drawn.    Patient verbalized understanding and in agreement with plan.    Patient informed fasting not required for lab ordered by Dr. Zapien.    SHASHANK MurrellN, RN  [facility]

## 2021-01-27 NOTE — TELEPHONE ENCOUNTER
Triage call:   [facility] calling   - had appointment with oncology   - Potassium was high and Dr Toledo would like potassium rechecked    - Dr Toledo would like PCP to place order for Potassium to monitor it    Please have PCP advise and place order- call Brenda at cancer care clinic with additional questions.     Britta Hill, RN BSN 1/27/2021 8:27 AM      Additional Information    Negative: Nursing judgment    Nursing judgment    Protocols used: INFORMATION ONLY CALL - NO TRIAGE-A-OH

## 2021-01-27 NOTE — TELEPHONE ENCOUNTER
Dr. Zapien-Please review and sign if agree.  Potassium lab pended.  Please advise when you recommend recheck?    Potassium   Date Value Ref Range Status   01/12/2021 5.6 (H) 3.4 - 5.3 mmol/L Final     Thank you!  SHASHANK MurrellN, RN  [facility]

## 2021-01-28 LAB
ALBUMIN SERPL-MCNC: 3.9 G/DL (ref 3.4–5)
ALP SERPL-CCNC: 86 U/L (ref 40–150)
ALT SERPL W P-5'-P-CCNC: 21 U/L (ref 0–50)
ANION GAP SERPL CALCULATED.3IONS-SCNC: 7 MMOL/L (ref 3–14)
AST SERPL W P-5'-P-CCNC: 16 U/L (ref 0–45)
BILIRUB SERPL-MCNC: 0.4 MG/DL (ref 0.2–1.3)
BUN SERPL-MCNC: 20 MG/DL (ref 7–30)
CALCIUM SERPL-MCNC: 10.1 MG/DL (ref 8.5–10.1)
CHLORIDE SERPL-SCNC: 103 MMOL/L (ref 94–109)
CO2 SERPL-SCNC: 26 MMOL/L (ref 20–32)
COPATH REPORT: NORMAL
CREAT SERPL-MCNC: 1.02 MG/DL (ref 0.52–1.04)
GFR SERPL CREATININE-BSD FRML MDRD: 49 ML/MIN/{1.73_M2}
GLUCOSE SERPL-MCNC: 93 MG/DL (ref 70–99)
POTASSIUM SERPL-SCNC: 4.4 MMOL/L (ref 3.4–5.3)
PROT SERPL-MCNC: 7.8 G/DL (ref 6.8–8.8)
SODIUM SERPL-SCNC: 136 MMOL/L (ref 133–144)
T4 FREE SERPL-MCNC: 0.7 NG/DL (ref 0.76–1.46)
TSH SERPL DL<=0.005 MIU/L-ACNC: 2.42 MU/L (ref 0.4–4)

## 2021-01-28 PROCEDURE — 36415 COLL VENOUS BLD VENIPUNCTURE: CPT | Performed by: FAMILY MEDICINE

## 2021-01-28 PROCEDURE — 80053 COMPREHEN METABOLIC PANEL: CPT | Performed by: FAMILY MEDICINE

## 2021-02-01 DIAGNOSIS — E04.2 NONTOXIC MULTINODULAR GOITER: ICD-10-CM

## 2021-02-01 LAB — TSI SER-ACNC: 4.6 TSI INDEX

## 2021-02-04 RX ORDER — METHIMAZOLE 5 MG/1
5 TABLET ORAL DAILY
Qty: 90 TABLET | Refills: 3 | Status: SHIPPED | OUTPATIENT
Start: 2021-02-04 | End: 2021-06-04

## 2021-02-04 NOTE — TELEPHONE ENCOUNTER
METHIMAZOLE 5 MG Tablet  Last Written Prescription Date:  5/12/2020  Last Fill Quantity: 90,   # refills: 3  Last Office Visit : 7/28/2020  Future Office visit:  4/5/2021    Routing refill request to provider for review/approval because:  Refills for this medication group require provider approval      Bel Swanson RN  Central Triage Red Flags/Med Refills

## 2021-02-08 DIAGNOSIS — E04.2 NONTOXIC MULTINODULAR GOITER: ICD-10-CM

## 2021-02-08 DIAGNOSIS — I10 ESSENTIAL HYPERTENSION WITH GOAL BLOOD PRESSURE LESS THAN 140/90: ICD-10-CM

## 2021-02-11 NOTE — TELEPHONE ENCOUNTER
Patient called and stated her pharmacy is waiting on the clinic to refill two of her prescriptions:    Irbesartan  Propranolol    Please send the request over to   [facility]  Fax: [phone]    Thank you,  Rashmi Owens    [facility]

## 2021-02-11 NOTE — TELEPHONE ENCOUNTER
Routing refill request to provider for review/approval because:  --Elevated blood pressures in last four visits - specialty appts.  --Please let TC know if you do want patient in for blood pressure follow up appointment.              --Last visit:  11/4/2020 Medicare Wellness with [facility].      BP Readings from Last 6 Encounters:   01/22/21 (!) 154/73   12/10/20 (!) 152/82   12/02/20 (!) 147/50   11/25/20 (!) 147/77   11/11/20 130/41   11/04/20 130/78

## 2021-02-12 RX ORDER — IRBESARTAN 300 MG/1
TABLET ORAL
Qty: 90 TABLET | Refills: 2 | Status: SHIPPED | OUTPATIENT
Start: 2021-02-12 | End: 2021-10-14

## 2021-02-12 RX ORDER — PROPRANOLOL HCL 60 MG
60 CAPSULE, EXTENDED RELEASE 24HR ORAL DAILY
Qty: 90 CAPSULE | Refills: 2 | Status: SHIPPED | OUTPATIENT
Start: 2021-02-12 | End: 2021-12-13

## 2021-02-15 DIAGNOSIS — M85.80 OSTEOPENIA, UNSPECIFIED LOCATION: ICD-10-CM

## 2021-02-17 NOTE — RESULT ENCOUNTER NOTE
Ms. Preet,  Your urine test showed atypical cells.  This is not uncommon after the procedures you have had.  I think we can continue with your follow-up as we have discussed previously (cystoscopy in the near future).  Thank you, Froilan Henry.

## 2021-02-19 RX ORDER — ALENDRONATE SODIUM 70 MG/1
TABLET ORAL
Qty: 12 TABLET | Refills: 3 | Status: SHIPPED | OUTPATIENT
Start: 2021-02-19 | End: 2022-01-24

## 2021-02-22 NOTE — PROGRESS NOTES
Outcome for 02/22/21 4:04 PM :Left Voicemail for patient to call back   Outcome for 02/23/21 9:14 AM : changed her appoint to a phone visit states that her tablet is not working     Dimple is a 87 year old who is being evaluated via a billable telephone visit.      What phone number would you like to be contacted at? [phone]  How would you like to obtain your AVS? Paulina  Phone call duration: 12minutes      This 87 year old woman was called for f/u of Graves.  She first developed symptoms of hyperthyroidism in December 2017.  She was most troubled by the tremor that occurred.  She had not noticed change in her skin or hair.  She saw Dr. Rojas in the community and he started her on methimazole.  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  Because she had received an iodine dye load when she had a coronary angiogram, we were unable to do a radioiodine scan and uptake to determine the precise cause of her hyperthyroidism.  She was maintained on methimazole until June 2017 when it was stopped.  A radioiodine uptake and scan was done in July, showed an uptake of ~ 70%, and confirmed she had Graves' disease.  She was restarted on methimazole in July 2018 and we have been adjusting the dose since then.  She has been taking 5 mg every AM every day.      On this dose, she feels well.  Her energy level is good.  Her appetite is fine.  She has no tremor or palpitations.  She does not feel too hot or cold.  She denies diplopia or mattering.  She saw her eye doctor yesterday and was told she has macular degeneration.    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  She is being carefully monitored for recurrence.  She had a knee replacement done last November and now is walking without the aid of her walker.  She is pain-free and feels very good.  Current Outpatient Medications   Medication      acetaminophen (TYLENOL) 325 MG tablet     alendronate (FOSAMAX) 70 MG tablet     calcium carbonate-vitamin D (OS-SHREYA) 500-400 MG-UNIT tablet     cycloSPORINE (RESTASIS) 0.05 % ophthalmic emulsion     ferrous sulfate 325 (65 Fe) MG TBEC EC tablet     furosemide (LASIX) 20 MG tablet     irbesartan (AVAPRO) 300 MG tablet     levofloxacin (LEVAQUIN) 500 MG tablet     lovastatin (MEVACOR) 40 MG tablet     melatonin 5 MG tablet     methimazole (TAPAZOLE) 5 MG tablet     Omega-3 Fatty Acids (FISH OIL) 500 MG CAPS     omeprazole (PRILOSEC) 20 MG DR capsule     oxyCODONE (ROXICODONE) 5 MG tablet     polyethylene glycol (MIRALAX) 17 g packet     Probiotic Product (PROBIOTIC ADVANCED PO)     propranolol ER (INDERAL LA) 60 MG 24 hr capsule     rOPINIRole (REQUIP) 0.25 MG tablet     senna-docusate (SENOKOT-S/PERICOLACE) 8.6-50 MG tablet     sertraline (ZOLOFT) 100 MG tablet     traZODone (DESYREL) 50 MG tablet     hydrOXYzine (ATARAX) 10 MG tablet     ondansetron (ZOFRAN-ODT) 4 MG ODT tab     No current facility-administered medications for this visit.      On exam she is cheerful and upbeat.  She answers my questions clearly.  ENDO THYROID LABS-[facility] [place] Latest Ref Rng & Units 1/27/2021 7/27/2020   TSH 0.40 - 4.00 mU/L 2.42 3.39   T4 FREE 0.76 - 1.46 ng/dL 0.70 (L) 0.73 (L)   FREE T3 2.3 - 4.2 pg/mL     TRIIODOTHYRONINE(T3) 60 - 181 ng/dL 103 73   THYROGLOBULIN ANTIBODY <40 IU/mL     THYR PEROXIDASE ZARA <35 IU/mL     THYROID STIM IMMUNOG <=1.3 TSI index 4.6 (H)      ENDO THYROID LABS-[facility] [place] Latest Ref Rng & Units 7/20/2020 1/28/2020   TSH 0.40 - 4.00 mU/L 3.08 1.43   T4 FREE 0.76 - 1.46 ng/dL  0.80   FREE T3 2.3 - 4.2 pg/mL     TRIIODOTHYRONINE(T3) 60 - 181 ng/dL  95   THYROGLOBULIN ANTIBODY <40 IU/mL     THYR PEROXIDASE ZARA <35 IU/mL     THYROID STIM IMMUNOG <=1.3 TSI index  3.2 (H)       EXAMINATION: US THYROID, 11/26/2018 1:09 PM      COMPARISON: 12/18/2017     HISTORY: Nontoxic multinodular goiter.     Technique: Grayscale and color  ultrasound imaging of the thyroid was  performed.     Findings:    Thyroid parenchyma: Heterogeneous with innumerable nodules, similar in  appearance to 12/18/2017. There remains coarse and dystrophic  appearing calcifications on the left. Multiple punctate echogenic foci  seen. There are also punctate echogenic foci likely representing  inspissated colloid as well as foci that could represent  microcalcifications, unchanged. Multiple nodules demonstrate  hypervascularity, stable.     The right lobe of the thyroid measures: 4.4 x 3.2 x 5.1 cm     The thyroid isthmus measures: 1.6 cm     The left lobe of the thyroid measures: 2.7 x 3 x 5.4 cm                                                                       Impression:  Enlarged heterogeneous thyroid gland with innumerable nodules which  are solid and cystic. There are coarse calcifications as well as  echogenic punctate foci likely inspissated colloid with possible  microcalcifications. No significant interval change from prior  ultrasound 12/18/2017.     I have personally reviewed the examination and initial interpretation  and I agree with the findings.     CANDIS ESPAÑA MD    Assessment and plan:    This 87-year-old woman has Graves' disease that caused hyperthyroidism in the past.  We have managed the hyperthyroidism with methimazole to good effect.  Her thyroid function tests have been stable on a 5 mg a day dose for some time.  She does not appear to have developed Graves' ophthalmopathy.  Her TSI remains elevated so I expect that she would have recurrent hyperthyroidism if we were to stop the medication. We will continue it at the 5 mg dose for now.  I warned her once again that the drug can cause neutropenia and that she should have her blood counts done if she develops fever or sore throat.  I will plan to see her back in a face-to-face visit in 6 months.  At that time I will have a chance to feel her thyroid gland and determine if there is any  evidence that her nodules have gotten bigger in size.    I spent 12 minutes on this phone visit.  I spent 8 additional minutes on the day of service reviewing and interpreting the lab data and doing documentation.    Dhara Meza MD

## 2021-02-23 ENCOUNTER — VIRTUAL VISIT (OUTPATIENT)
Dept: ENDOCRINOLOGY | Facility: CLINIC | Age: 86
End: 2021-02-23
Payer: MEDICARE

## 2021-02-23 DIAGNOSIS — N18.30 STAGE 3 CHRONIC KIDNEY DISEASE, UNSPECIFIED WHETHER STAGE 3A OR 3B CKD (H): ICD-10-CM

## 2021-02-23 DIAGNOSIS — E05.00 GRAVES DISEASE: Primary | ICD-10-CM

## 2021-02-23 PROCEDURE — 99442 PR PHYSICIAN TELEPHONE EVALUATION 11-20 MIN: CPT | Mod: 95 | Performed by: INTERNAL MEDICINE

## 2021-02-23 NOTE — LETTER
2/23/2021       RE: Dimple Oviedo  [address]  [facility] [address]     Dear Colleague,    Thank you for referring your patient, Dimple Oviedo, to the [facility] [place] [facility]. Please see a copy of my visit note below.    Outcome for 02/22/21 4:04 PM :Left Voicemail for patient to call back   Outcome for 02/23/21 9:14 AM : changed her appoint to a phone visit states that her tablet is not working     Dimple is a 87 year old who is being evaluated via a billable telephone visit.      What phone number would you like to be contacted at? [phone]  How would you like to obtain your AVS? [email or web address]  Phone call duration: 12minutes      This 87 year old woman was called for f/u of Graves.  She first developed symptoms of hyperthyroidism in December 2017.  She was most troubled by the tremor that occurred.  She had not noticed change in her skin or hair.  She saw Dr. Rojas in the community and he started her on methimazole.  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  Because she had received an iodine dye load when she had a coronary angiogram, we were unable to do a radioiodine scan and uptake to determine the precise cause of her hyperthyroidism.  She was maintained on methimazole until June 2017 when it was stopped.  A radioiodine uptake and scan was done in July, showed an uptake of ~ 70%, and confirmed she had Graves' disease.  She was restarted on methimazole in July 2018 and we have been adjusting the dose since then.  She has been taking 5 mg every AM every day.      On this dose, she feels well.  Her energy level is good.  Her appetite is fine.  She has no tremor or palpitations.  She does not feel too hot or cold.  She denies diplopia or mattering.  She saw her eye doctor yesterday and was told she has macular  degeneration.    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  She is being carefully monitored for recurrence.  She had a knee replacement done last November and now is walking without the aid of her walker.  She is pain-free and feels very good.  Current Outpatient Medications   Medication     acetaminophen (TYLENOL) 325 MG tablet     alendronate (FOSAMAX) 70 MG tablet     calcium carbonate-vitamin D (OS-SHREYA) 500-400 MG-UNIT tablet     cycloSPORINE (RESTASIS) 0.05 % ophthalmic emulsion     ferrous sulfate 325 (65 Fe) MG TBEC EC tablet     furosemide (LASIX) 20 MG tablet     irbesartan (AVAPRO) 300 MG tablet     levofloxacin (LEVAQUIN) 500 MG tablet     lovastatin (MEVACOR) 40 MG tablet     melatonin 5 MG tablet     methimazole (TAPAZOLE) 5 MG tablet     Omega-3 Fatty Acids (FISH OIL) 500 MG CAPS     omeprazole (PRILOSEC) 20 MG DR capsule     oxyCODONE (ROXICODONE) 5 MG tablet     polyethylene glycol (MIRALAX) 17 g packet     Probiotic Product (PROBIOTIC ADVANCED PO)     propranolol ER (INDERAL LA) 60 MG 24 hr capsule     rOPINIRole (REQUIP) 0.25 MG tablet     senna-docusate (SENOKOT-S/PERICOLACE) 8.6-50 MG tablet     sertraline (ZOLOFT) 100 MG tablet     traZODone (DESYREL) 50 MG tablet     hydrOXYzine (ATARAX) 10 MG tablet     ondansetron (ZOFRAN-ODT) 4 MG ODT tab     No current facility-administered medications for this visit.      On exam she is cheerful and upbeat.  She answers my questions clearly.  [facility]YROID [facility] Latest Ref Rng & Units 1/27/2021 7/27/2020   TSH 0.40 - 4.00 mU/L 2.42 3.39   T4 FREE 0.76 - 1.46 ng/dL 0.70 (L) 0.73 (L)   FREE T3 2.3 - 4.2 pg/mL     TRIIODOTHYRONINE(T3) 60 - 181 ng/dL 103 73   THYROGLOBULIN ANTIBODY <40 IU/mL     THYR PEROXIDASE ZARA <35 IU/mL     THYROID STIM IMMUNOG <=1.3 TSI index 4.6 (H)      [facility] Latest Ref Rng & Units 7/20/2020 1/28/2020   TSH 0.40 - 4.00 mU/L 3.08 1.43   T4 FREE 0.76 - 1.46 ng/dL  0.80    FREE T3 2.3 - 4.2 pg/mL     TRIIODOTHYRONINE(T3) 60 - 181 ng/dL  95   THYROGLOBULIN ANTIBODY <40 IU/mL     THYR PEROXIDASE ZARA <35 IU/mL     THYROID STIM IMMUNOG <=1.3 TSI index  3.2 (H)       EXAMINATION: US THYROID, 11/26/2018 1:09 PM      COMPARISON: 12/18/2017     HISTORY: Nontoxic multinodular goiter.     Technique: Grayscale and color ultrasound imaging of the thyroid was  performed.     Findings:    Thyroid parenchyma: Heterogeneous with innumerable nodules, similar in  appearance to 12/18/2017. There remains coarse and dystrophic  appearing calcifications on the left. Multiple punctate echogenic foci  seen. There are also punctate echogenic foci likely representing  inspissated colloid as well as foci that could represent  microcalcifications, unchanged. Multiple nodules demonstrate  hypervascularity, stable.     The right lobe of the thyroid measures: 4.4 x 3.2 x 5.1 cm     The thyroid isthmus measures: 1.6 cm     The left lobe of the thyroid measures: 2.7 x 3 x 5.4 cm                                                                       Impression:  Enlarged heterogeneous thyroid gland with innumerable nodules which  are solid and cystic. There are coarse calcifications as well as  echogenic punctate foci likely inspissated colloid with possible  microcalcifications. No significant interval change from prior  ultrasound 12/18/2017.     I have personally reviewed the examination and initial interpretation  and I agree with the findings.     CANDIS ESPAÑA MD    Assessment and plan:    This 87-year-old woman has Graves' disease that caused hyperthyroidism in the past.  We have managed the hyperthyroidism with methimazole to good effect.  Her thyroid function tests have been stable on a 5 mg a day dose for some time.  She does not appear to have developed Graves' ophthalmopathy.  Her TSI remains elevated so I expect that she would have recurrent hyperthyroidism if we were to stop the medication. We  will continue it at the 5 mg dose for now.  I warned her once again that the drug can cause neutropenia and that she should have her blood counts done if she develops fever or sore throat.  I will plan to see her back in a face-to-face visit in 6 months.  At that time I will have a chance to feel her thyroid gland and determine if there is any evidence that her nodules have gotten bigger in size.    I spent 12 minutes on this phone visit.  I spent 8 additional minutes on the day of service reviewing and interpreting the lab data and doing documentation.    Dhara Meza MD

## 2021-03-08 ENCOUNTER — ANCILLARY PROCEDURE (OUTPATIENT)
Dept: CT IMAGING | Facility: CLINIC | Age: 86
End: 2021-03-08
Attending: INTERNAL MEDICINE
Payer: MEDICARE

## 2021-03-08 DIAGNOSIS — I50.22 CHRONIC SYSTOLIC HEART FAILURE (H): ICD-10-CM

## 2021-03-08 DIAGNOSIS — C66.1 UROTHELIAL CARCINOMA OF RIGHT DISTAL URETER (H): ICD-10-CM

## 2021-03-08 PROCEDURE — 71250 CT THORAX DX C-: CPT | Mod: MG | Performed by: RADIOLOGY

## 2021-03-08 PROCEDURE — G1004 CDSM NDSC: HCPCS | Mod: GC | Performed by: RADIOLOGY

## 2021-03-10 ENCOUNTER — VIRTUAL VISIT (OUTPATIENT)
Dept: ONCOLOGY | Facility: CLINIC | Age: 86
End: 2021-03-10
Attending: INTERNAL MEDICINE
Payer: MEDICARE

## 2021-03-10 DIAGNOSIS — C66.1 UROTHELIAL CARCINOMA OF RIGHT DISTAL URETER (H): Primary | ICD-10-CM

## 2021-03-10 DIAGNOSIS — C68.9 UROTHELIAL CARCINOMA (H): ICD-10-CM

## 2021-03-10 PROCEDURE — 999N001193 HC VIDEO/TELEPHONE VISIT; NO CHARGE

## 2021-03-10 PROCEDURE — 99442 PR PHYSICIAN TELEPHONE EVALUATION 11-20 MIN: CPT | Mod: 95 | Performed by: INTERNAL MEDICINE

## 2021-03-10 NOTE — LETTER
"    3/10/2021         RE: Dimple Oviedo  [address]  [facility] [address]        Dear Colleague,    Thank you for referring your patient, Dimple Oviedo, to the [facility]. Please see a copy of my visit note below.    Dimple is a 87 year old who is being evaluated via a billable telephone visit.      What phone number would you like to be contacted at? [phone]  How would you like to obtain your AVS? Paulina     I have reviewed and updated the patient's allergies and medication list.    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    HISTORY OF PRESENT ILLNESS:  Ms. 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 Her oncologic history is as under.    Ms. Oviedo is doing fair overall but reports HERMAN after walking ~100-200 feet. Worse compared with 2 months ago. Taking diuretic and followed by cardiology for heart failure but no cards visit x 1 year. ECHO in Jan 2020 (1 year ago) had moderate AORTIC STENOSIS and LVEF of 60-65% with grade 2 LV diastolic dysfx. No recurrence of Afib. She denies fever, chills, overt shortness of breath at rest or chest pain. Unable to play cards due to COVID-19, but trying to stay active. Underwent L knee arthroplasty for OA in Nov 2020. Recovering well but has chronic lymphedema.     There is no clinical evidence of disease progression. Urinary (urge) incontinence x " "2.5 years stable and signs/symptoms of UTI. She follows with Dr. Henry and last cystoscopy was in 2020 (was negative). However, urine cytology was positive for urothelial carcinoma in Dec 2020 after second course of BCG. Due to follow-up soon.      ONCOLOGIC HISTORY:  1. 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   - Jan 2018 to September 2018- Persistent gross hematuria and underwent multiple procedures.    - 2/19/18 and 4/3/18- cystoscopies with bladder biopsies  which were negative for bladder tumor  - 1/17/18, 3/8/18, 7/26/18, 9/10/18- Multiple urine cytologies have come back positive by FISH for high-grade transitional cell carcinoma  - 9/10/18- Underwent a bilateral cystoureteroscopy with biopsy and brushing that showed  right upper tract cytology suspicious for high-grade urothelial ca. Right ureter brushing negative from that day. Left upper tract negative.  - 9/10/18- CT A/P without contrast showed new small bilateral pleural effusions and interstitial pulmonary edema but no evidence of locoregional or metastatic disease.  - 9/12/18- Presented to ED with oliguria, CRESENCIO, and mild hydronephrosis bilaterally on CT scan and underwent bilateral ureteral stent placment  - 11/13/2018- Right robotic nephroureterectomy, excision of ana-caval mass and LN excision by Stuart King. Pathology showed \"Right nephroureterectomy: Invasive high grade urothelial carcinoma measuring 3.5 cm, located in renal pelvis and invading through renal parenchyma into perinephric fat. Urothelial carcinoma in-situ present. Margins free of tumor. Ana-caval mass: Metastatic urothelial carcinoma involving one lymph node with at least 1 cm tumor deposit. Pericaval Extranodal Extension present. Five additional benign pericaval lymph nodes. Pathologic stage: pT4N1 (1 of 6 lymph nodes).\"  - 12/11/18- PET/CT whole body demonstrated " "significant residual FDG avid disease. For example, \"there is an FDG avid ill-defined pericaval mass immediately medial to the surgical clips measuring approximately 2.0 x 1.4 cm, with max SUV measuring up to12.2, see series 4 image 21. Additional FDG avid retrocaval and interaortocaval lymphadenopathy are noted.There is a 1.2 cm nodule in the right hemipelvis posterior lateral tothe bladder with max SUV measuring 8.3, see series 4 image 77. The bladder is incompletely distended.\" No suspicious thoracic or bone uptake.  - 12/12/18- Started adjuvant chemotherapy (carbo+gem) per POUT trial. Cycle 2 - 1/2/19. Cycle 3 - 1/23/19. Cycle 4 - 02/13/19.  - 1/22/19 - CT C/A/P with contrast compared with prior PET/CT: \"1. New well-circumscribed soft tissue pulmonary nodules of the right lower lobe and left upper lobe. Findings could be infectious, inflammatory, or represent metastatic disease. Continued attention on follow-up recommended. Postoperative changes of right nephrectomy. No evidence for local recurrence in the present study.\"  - 3/1/2019- CT C/A/P with contrast - \"1. Bilateral pulmonary nodules measuring up to 4 mm in the right lower lobe, previously measuring 8 mm. No new or enlarging pulmonary nodules. 2. No convincing evidence for metastatic disease in the abdomen, pelvis and bones.\"  - 6/3/2019- CT C/A/P with contrast - \"1. Surgical changes of right nephrectomy without evidence of residual or recurrent disease on this noncontrast exam.  Consider contrast enhanced examination on follow-up. 2. No evidence of metastatic disease in the abdomen, or pelvis on noncontrast CT.  Scattered tiny pulmonary nodules in the chest are not significantly changed.  Previously described prominent right lower lobe pulmonary nodule (previously measuring up to 8 mm) is no longer visualized. 3. Stable bilateral pulmonary nodules measuring maximally 4 mm.\"  - 09/11/19: CT C/A/P without contrast - \"1. Stable exam without evidence " "convincing for new disease. 2. Stable pulmonary nodules. 3. 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 4. Stable heterogeneous enlargement of the thyroid with underlying nodules suggested.\"  - 12/4/19: CT C/A/P without contrast - \"No acute change since prior exam. No evidence of recurrent or metastatic disease. Tiny lung nodules are stable. Prior right nephrectomy. Left renal artery aneurysm is stable.\"  - 04/08/20, 7/27/20: CT C/A/P with contrast - GABRIELLE.  - 01/12/21: CT C/A/P with contrast - \"1.  Slight increased size of a 6 mm left upper lobe nodule and new 4 mm linear opacity along the right major fissure. These are indeterminate but could represent progression of metastatic disease. 2.  Slight increased size of mildly enlarged mediastinal and hilar lymph nodes. 3.  Mild pulmonary edema. 4.  No recurrent or metastatic disease in the abdomen and pelvis. 5.  Mild stranding around the urinary bladder dome may be related to cystitis. Punctate focus of gas within the urinary bladder either secondary to infection or instrumentation. 6.  Enlarged multinodular thyroid gland.\"    2. Non-muscle invasive bladder cancer:  - 10/3/19: Cystoscopy showed a small area of erythema on retroflexion, possibly pre-existing or due to retroflexion of the scope. Urine cytology from that time showed cells suspicious for malignancy.   - 1/13/20: Bladder biopsy showed high grade urothelial carcinoma in-situ  - 2/12/20-3/18/20: Intravesical BCG therapy  - 7/24/20 and last cystoscopy was on the same day. It was negative. However, urine cytology was positive for high-grade urothelial carcinoma.   - 11/25/20-12/10/2020: 3 weekly doses of intravesical BCG 50mg.   - 12/10/20: Cytology suspicious and FISH urovysion positive for TCC.    REVIEW OF SYSTEMS: 14 point ROS negative other than the symptoms noted above in the HPI.    PAST MEDICAL AND SURGICAL HISTORY:   Past Medical History:   Diagnosis Date     Calculus of kidney      " Chemotherapy-induced neutropenia (H) 3/6/2019     Esophageal reflux      GERD (gastroesophageal reflux disease)      Hyperlipidemia LDL goal <130 5/9/2010     Malignant melanoma of skin of trunk, except scrotum (H)      Nonspecific abnormal finding     has living will 2004 -      Nontoxic multinodular goiter     no further eval /tx rec per pt     Osteopenia      Other psoriasis      Personal history of colonic polyps      PMR (polymyalgia rheumatica) (H)      Stress-induced cardiomyopathy      Undiagnosed cardiac murmurs      Unspecified constipation      Unspecified essential hypertension      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     Past Surgical History:   Procedure Laterality Date     ARTHROPLASTY KNEE Right 11/9/2020    Procedure: ARTHROPLASTY, KNEE, TOTAL, RIGHT;  Surgeon: Edward Otero MD;  [facility]     BIOPSY       COLONOSCOPY  2014     COMBINED CYSTOSCOPY, INSERT STENT URETER(S) Bilateral 9/12/2018    Procedure: COMBINED CYSTOSCOPY, INSERT STENT URETER(S);  Cystoscopy Bilateral ureteral Stent Placement.;  Surgeon: Justin Henry MD;  [facility]     COMBINED CYSTOSCOPY, RETROGRADES, URETEROSCOPY, INSERT STENT Bilateral 4/3/2018    Procedure: COMBINED CYSTOSCOPY, RETROGRADES, URETEROSCOPY, INSERT STENT;;  Surgeon: Stuart King MD;  [facility]     COMBINED CYSTOSCOPY, RETROGRADES, URETEROSCOPY, INSERT STENT Bilateral 9/10/2018    Procedure: COMBINED CYSTOSCOPY, RETROGRADES, URETEROSCOPY, INSERT STENT;  Cystoscopy, Bilateral Ureteroscopy, Bladder Biopsies, Retrogram Pyelograms, Ureteral Washings and brushings, cysview;  Surgeon: Stuart King MD;  [facility]     COMBINED CYSTOSCOPY, RETROGRADES, URETEROSCOPY, INSERT STENT Left 8/26/2020    Procedure: Cystoscopy, Left Ureteral Wash, Left ureteral Retrograde Pyelogram;  Surgeon: Justin Henry MD;  [facility]     CYSTOSCOPY, BIOPSY BLADDER INSTILL OPTICAL AGENT N/A 4/3/2018    Procedure:  CYSTOSCOPY, BIOPSY BLADDER INSTILL OPTICAL AGENT;  Cystoscopy, Blue Light Cystoscopy, Bladder Biopsies, Bilateral Selective ureteral washings for Cytology, Bilateral Retrograde Pyelograms, Bilateral Ureteroscopy;  Surgeon: Stuart King MD;  [facility]     CYSTOSCOPY, BIOPSY BLADDER, COMBINED N/A 2/19/2018    Procedure: COMBINED CYSTOSCOPY, BIOPSY BLADDER;  Cystoscopy, Bladder Biopsy;  Surgeon: Kenna La MD;  [facility]     CYSTOSCOPY, BIOPSY BLADDER, COMBINED N/A 8/26/2020    Procedure: Cystoscopy, biopsy bladder, combined;  Surgeon: Justin Henry MD;  [facility]     CYSTOSCOPY, FULGURATE BLADDER TUMOR, COMBINED N/A 1/13/2020    Procedure: CYSTOSCOPY, WITH  bladder biopsies and fulguration;  Surgeon: Stuart King MD;  [facility]     CYSTOSCOPY, REMOVE STENT(S), COMBINED  11/13/2018    Procedure: Flexible Cystoscopy with Stent Removal;  Surgeon: Stuart King MD;  [facility]     DAVINCI LYMPHADENECTOMY N/A 11/13/2018    Procedure: Davinci Lymphadenectomy ;  Surgeon: Stuart King MD;  [facility]     DAVINCI NEPHROURETERECTOMY N/A 11/13/2018    Procedure: Right DaVinci Assisted Nephroureterectomy;  Surgeon: Stuart King MD;  [facility]     ENDOSCOPIC ULTRASOUND LOWER GASTROINTESTIONAL TRACT (GI) N/A 10/30/2015    Procedure: ENDOSCOPIC ULTRASOUND LOWER GASTROINTESTIONAL TRACT (GI);  Surgeon: Daniel Jean-Baptiste MD;  [facility]     EYE SURGERY  12/4/17     INSERT PORT VASCULAR ACCESS Right 12/19/2018    Procedure: Chest Port Placement - right;  Surgeon: Stuart Chavez PA-C;  [facility]     IR CHEST PORT PLACEMENT > 5 YRS OF AGE  12/19/2018     IR PORT REMOVAL RIGHT  6/26/2019     LAPAROSCOPIC CHOLECYSTECTOMY WITH CHOLANGIOGRAMS N/A 11/1/2015    Procedure: LAPAROSCOPIC CHOLECYSTECTOMY WITH CHOLANGIOGRAMS;  Surgeon: Tonie Warren MD;  [facility]     REMOVE PORT  VASCULAR ACCESS Right 6/26/2019    Procedure: Right Port Removal;  Surgeon: Froilan Irizarry PA-C;  [facility]     SURGICAL HISTORY OF -   1996    malignant melanoma     SURGICAL HISTORY OF -   1968    thyroid nodule     SURGICAL HISTORY OF -       D & C     Z NONSPECIFIC PROCEDURE  2005    colonoscopy polyp repeat 2010     SOCIAL HISTORY:   Social History     Tobacco Use     Smoking status: Never Smoker     Smokeless tobacco: Never Used   Substance Use Topics     Alcohol use: No     Alcohol/week: 0.0 standard drinks     Comment: rare     Drug use: No     FAMILY HISTORY:   Family History   Problem Relation Age of Onset     Cancer Father         dec - esophageal and laryngeal     Heart Disease Mother      Respiratory Mother         dec     Breast Cancer Daughter      Other Cancer Daughter      Thyroid Disease Daughter      Asthma Daughter      Hyperlipidemia Son      Diabetes Son      Anesthesia Reaction No family hx of      Deep Vein Thrombosis (DVT) No family hx of      ALLERGIES:   Allergies   Allergen Reactions     Codeine      CURRENT MEDICATIONS:   Current Outpatient Medications:      acetaminophen (TYLENOL) 325 MG tablet, Take 3 tablets (975 mg) by mouth every 8 hours as needed for pain, Disp: 1 Bottle, Rfl: 0     alendronate (FOSAMAX) 70 MG tablet, TAKE 1 TABLET EVERY 7 DAYS AT LEAST 60 MINUTES BEFORE BREAKFAST AS DIRECTED., Disp: 12 tablet, Rfl: 3     calcium carbonate-vitamin D (OS-SHREYA) 500-400 MG-UNIT tablet, Take 1 tablet by mouth daily, Disp: , Rfl:      cycloSPORINE (RESTASIS) 0.05 % ophthalmic emulsion, Place 1 drop into both eyes 2 times daily, Disp: , Rfl:      ferrous sulfate 325 (65 Fe) MG TBEC EC tablet, Take 325 mg by mouth every morning , Disp: , Rfl:      furosemide (LASIX) 20 MG tablet, TAKE 1 TABLET (20 MG) BY MOUTH EVERY MORNING, Disp: 90 tablet, Rfl: 3     irbesartan (AVAPRO) 300 MG tablet, TAKE 1 TABLET EVERY DAY, Disp: 90 tablet, Rfl: 2     levofloxacin (LEVAQUIN) 500 MG tablet,  Take one tablet 6 hours after treatment and one tablet the following morning after treatment., Disp: 6 tablet, Rfl: 0     lovastatin (MEVACOR) 40 MG tablet, Take 1 tablet (40 mg) by mouth At Bedtime, Disp: 90 tablet, Rfl: 3     melatonin 5 MG tablet, Take 5 mg by mouth At Bedtime, Disp: , Rfl:      methimazole (TAPAZOLE) 5 MG tablet, Take 1 tablet (5 mg) by mouth daily, Disp: 90 tablet, Rfl: 3     Omega-3 Fatty Acids (FISH OIL) 500 MG CAPS, Take 1 capsule by mouth every morning , Disp: , Rfl:      omeprazole (PRILOSEC) 20 MG DR capsule, TAKE 1 CAPSULE EVERY DAY, Disp: 90 capsule, Rfl: 1     oxyCODONE (ROXICODONE) 5 MG tablet, Take 1 tablet (5 mg) by mouth every 4 hours as needed for moderate to severe pain, Disp: 20 tablet, Rfl: 0     polyethylene glycol (MIRALAX) 17 g packet, Take 17 g by mouth daily, Disp: 10 packet, Rfl: 0     Probiotic Product (PROBIOTIC ADVANCED PO), Take 1 capsule by mouth every morning , Disp: , Rfl:      propranolol ER (INDERAL LA) 60 MG 24 hr capsule, Take 1 capsule (60 mg) by mouth daily, Disp: 90 capsule, Rfl: 2     rOPINIRole (REQUIP) 0.25 MG tablet, Take 2 tablets (0.5 mg) by mouth every afternoon at 4 PM, and take 1 tablet (0.25 mg) by mouth at bedtime daily., Disp: , Rfl:      senna-docusate (SENOKOT-S/PERICOLACE) 8.6-50 MG tablet, Take 1-2 tablets by mouth 2 times daily as needed for constipation, Disp: 30 tablet, Rfl: 0     sertraline (ZOLOFT) 100 MG tablet, TAKE 1 TABLET EVERY MORNING, Disp: 90 tablet, Rfl: 0     traZODone (DESYREL) 50 MG tablet, TAKE 1/2 TABLET AT BEDTIME, Disp: 45 tablet, Rfl: 2     hydrOXYzine (ATARAX) 10 MG tablet, Take 1 tablet (10 mg) by mouth every 6 hours as needed for itching or other (adjuvant pain) (Patient not taking: Reported on 2/23/2021), Disp: 10 tablet, Rfl: 0     ondansetron (ZOFRAN-ODT) 4 MG ODT tab, Take 1 tablet (4 mg) by mouth every 6 hours as needed for nausea or vomiting (Patient not taking: Reported on 2/23/2021), Disp: 20 tablet, Rfl:  0    PHYSICAL EXAMINATION:  Deferred. Phone visit due to COVID.    LABORATORY DATA:   Recent Labs   Lab Test 01/28/21  1340 01/12/21  1230 11/11/20  0617 11/10/20  0654 11/10/20  0654 10/28/20  1244 10/28/20  1244 09/03/20  1213 07/20/20  1828 07/20/20  1828   WBC  --  6.2  --   --   --   --  6.4 11.2*   < > 7.1   RBC  --  3.79*  --   --   --   --  4.14 4.08   < > 4.04   HGB  --  11.7 9.9*  --  10.3*  --  13.0 13.0   < > 12.9   HCT  --  38.0  --   --   --   --  40.4 40.2   < > 39.8   MCV  --  100  --   --   --   --  98 99   < > 99   MCH  --  30.9  --   --   --   --  31.4 31.9   < > 31.9   MCHC  --  30.8*  --   --   --   --  32.2 32.3   < > 32.4   RDW  --  12.9  --   --   --   --  13.2 12.8   < > 12.6   PLT  --  171  --   --   --   --  218 224   < > 224   NEUTROPHIL  --  67.6  --   --   --   --   --  77.6  --  64.3    133 134   < > 133  --   --  138   < > 134   POTASSIUM 4.4 5.6* 4.2   < > 4.2   < >  --  4.4   < > 4.8   CHLORIDE 103 100 105   < > 101  --   --  104   < > 101   CO2 26 31 25   < > 28  --   --  30   < > 30   ANIONGAP 7 2* 4   < > 4  --   --  4   < > 3   GLC 93 110* 89   < > 99   < >  --  93   < > 90   BUN 20 22 16   < > 21  --   --  23   < > 25   CR 1.02 0.97 1.06*   < > 1.25*   < >  --  1.03   < > 0.98   SHREYA 10.1 8.6 7.7*   < > 8.1*  --   --  9.0   < > 8.8   PROTTOTAL 7.8 6.8  --   --   --   --   --  7.5   < > 7.6   ALBUMIN 3.9 3.2*  --   --   --   --   --  3.6   < > 3.7   BILITOTAL 0.4 0.4  --   --   --   --   --  0.4   < > 0.5   ALKPHOS 86 91  --   --   --   --   --  97   < > 90   AST 16 16  --   --   --   --   --  16   < > 30   ALT 21 18  --   --   --   --   --  20   < > 21    < > = values in this interval not displayed.     IMAGING STUDIES:  As above.     ASSESSMENT AND PLAN: Ms. 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    1. Upper tract urothelial cancer:   - She completed 4 cycles of adjuvant carboplatin plus gemcitabine chemotherapy per POUT trial. No residual side effects or evidence of recurrence.  - Reviewed restaging CT scan from Jan and March 2021 that showed no clear evidence of disease recurrence in abd/pelvis.  - Lung findings are non-specific but I explained that there's a small possibility of these being due to cancer recurrence. However, the nodules are tiny and have remained stable x 2 months. She has no clinical evidence of disease recurrence.  - At this time, the best option is to continue active surveillance as the patient undergoes an evaluation for cardiogenic etiologies for her HERMAN. She's aware that this involves close monitoring for new or worsening signs or symptoms such as shortness of breath, chest pain, abdominal pain, unexplained weight loss, hematuria etc.  - Repeat CT C/A/P without contrast in 2 months.     2. High grade urothelial carcinoma in situ:  - Bx proven carcinoma in situ in 1/2020, completed the first round of intravesical BCG treatment 2/2020-3/2020 and second in 11/2020-12/2020.  - CT findings of bladder inflammation consistent with recent BCG treatment. No new clinical signs/symptoms.  - Urine cytology positive again in end Dec 2020. Could be a candidate for mitogel v nivo+TRINI clinical trial (PI: Joseph Dunn MD).    - Plan for a repeat cysto in end of April. Dr. Henry managing.     3. Chemo induced anemia and thrombocytopenia:  - Resolved.     4. Worsening HERMAN:  - Have referred her back to Dr. Griffith in Cardiology for her h/o moderate AORTIC STENOSIS, CHF, and Afib now with worsening SOA and lymphedema with no cancer-related etiology evident.     Return to clinic in 2 months with restaging scans.    All of the above was explained to the patient in lay language and several questions were answered. They are in agreement with the plan.    BILLING:  [id number] - phone visit duration - 13 mins    Jaden Toledo M.D.  [place]. Professor of Medicine  Genitourinary Oncology  Division of Hematology, Oncology & Transplantation  [facility]

## 2021-03-10 NOTE — PROGRESS NOTES
"Dimple is a 87 year old who is being evaluated via a billable telephone visit.      What phone number would you like to be contacted at? [phone]  How would you like to obtain your AVS? Paulina     I have reviewed and updated the patient's allergies and medication list.    Concerns: none  Refills: none     Vitals - Patient Reported  Weight (Patient Reported): 74.8 kg (165 lb)  Height (Patient Reported): 144.8 cm (4' 9\")  BMI (Based on Pt Reported Ht/Wt): 35.71  Pain Score: No Pain (0)    Yvonne Cowart CMA        Phone call duration: 13 minutes  Jaden Toledo MD     "

## 2021-03-10 NOTE — PROGRESS NOTES
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    HISTORY OF PRESENT ILLNESS:  Ms. 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 Her oncologic history is as under.    Ms. Oviedo is doing fair overall but reports HERMAN after walking ~100-200 feet. Worse compared with 2 months ago. Taking diuretic and followed by cardiology for heart failure but no cards visit x 1 year. ECHO in Jan 2020 (1 year ago) had moderate AORTIC STENOSIS and LVEF of 60-65% with grade 2 LV diastolic dysfx. No recurrence of Afib. She denies fever, chills, overt shortness of breath at rest or chest pain. Unable to play cards due to COVID-19, but trying to stay active. Underwent L knee arthroplasty for OA in Nov 2020. Recovering well but has chronic lymphedema.     There is no clinical evidence of disease progression. Urinary (urge) incontinence x 2.5 years stable and signs/symptoms of UTI. She follows with Dr. Henry and last cystoscopy was in 2020 (was negative). However, urine cytology was positive for urothelial carcinoma in Dec 2020 after second course of BCG. Due to follow-up soon.      ONCOLOGIC HISTORY:  1. 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   - Jan 2018 to September 2018- Persistent gross hematuria and underwent multiple procedures.    - 2/19/18 and 4/3/18- cystoscopies with bladder biopsies  which were negative for bladder tumor  - 1/17/18, 3/8/18, 7/26/18, 9/10/18- Multiple urine cytologies have come back positive by FISH for high-grade transitional cell carcinoma  - 9/10/18- Underwent a bilateral cystoureteroscopy with biopsy and brushing that showed   "right upper tract cytology suspicious for high-grade urothelial ca. Right ureter brushing negative from that day. Left upper tract negative.  - 9/10/18- CT A/P without contrast showed new small bilateral pleural effusions and interstitial pulmonary edema but no evidence of locoregional or metastatic disease.  - 9/12/18- Presented to ED with oliguria, CRESENCIO, and mild hydronephrosis bilaterally on CT scan and underwent bilateral ureteral stent placment  - 11/13/2018- Right robotic nephroureterectomy, excision of ana-caval mass and LN excision by Stuart King. Pathology showed \"Right nephroureterectomy: Invasive high grade urothelial carcinoma measuring 3.5 cm, located in renal pelvis and invading through renal parenchyma into perinephric fat. Urothelial carcinoma in-situ present. Margins free of tumor. Ana-caval mass: Metastatic urothelial carcinoma involving one lymph node with at least 1 cm tumor deposit. Pericaval Extranodal Extension present. Five additional benign pericaval lymph nodes. Pathologic stage: pT4N1 (1 of 6 lymph nodes).\"  - 12/11/18- PET/CT whole body demonstrated significant residual FDG avid disease. For example, \"there is an FDG avid ill-defined pericaval mass immediately medial to the surgical clips measuring approximately 2.0 x 1.4 cm, with max SUV measuring up to12.2, see series 4 image 21. Additional FDG avid retrocaval and interaortocaval lymphadenopathy are noted.There is a 1.2 cm nodule in the right hemipelvis posterior lateral tothe bladder with max SUV measuring 8.3, see series 4 image 77. The bladder is incompletely distended.\" No suspicious thoracic or bone uptake.  - 12/12/18- Started adjuvant chemotherapy (carbo+gem) per POUT trial. Cycle 2 - 1/2/19. Cycle 3 - 1/23/19. Cycle 4 - 02/13/19.  - 1/22/19 - CT C/A/P with contrast compared with prior PET/CT: \"1. New well-circumscribed soft tissue pulmonary nodules of the right lower lobe and left upper lobe. Findings could be " "infectious, inflammatory, or represent metastatic disease. Continued attention on follow-up recommended. Postoperative changes of right nephrectomy. No evidence for local recurrence in the present study.\"  - 3/1/2019- CT C/A/P with contrast - \"1. Bilateral pulmonary nodules measuring up to 4 mm in the right lower lobe, previously measuring 8 mm. No new or enlarging pulmonary nodules. 2. No convincing evidence for metastatic disease in the abdomen, pelvis and bones.\"  - 6/3/2019- CT C/A/P with contrast - \"1. Surgical changes of right nephrectomy without evidence of residual or recurrent disease on this noncontrast exam.  Consider contrast enhanced examination on follow-up. 2. No evidence of metastatic disease in the abdomen, or pelvis on noncontrast CT.  Scattered tiny pulmonary nodules in the chest are not significantly changed.  Previously described prominent right lower lobe pulmonary nodule (previously measuring up to 8 mm) is no longer visualized. 3. Stable bilateral pulmonary nodules measuring maximally 4 mm.\"  - 09/11/19: CT C/A/P without contrast - \"1. Stable exam without evidence convincing for new disease. 2. Stable pulmonary nodules. 3. 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 4. Stable heterogeneous enlargement of the thyroid with underlying nodules suggested.\"  - 12/4/19: CT C/A/P without contrast - \"No acute change since prior exam. No evidence of recurrent or metastatic disease. Tiny lung nodules are stable. Prior right nephrectomy. Left renal artery aneurysm is stable.\"  - 04/08/20, 7/27/20: CT C/A/P with contrast - GABRIELLE.  - 01/12/21: CT C/A/P with contrast - \"1.  Slight increased size of a 6 mm left upper lobe nodule and new 4 mm linear opacity along the right major fissure. These are indeterminate but could represent progression of metastatic disease. 2.  Slight increased size of mildly enlarged mediastinal and hilar lymph nodes. 3.  Mild pulmonary edema. 4.  No recurrent or " "metastatic disease in the abdomen and pelvis. 5.  Mild stranding around the urinary bladder dome may be related to cystitis. Punctate focus of gas within the urinary bladder either secondary to infection or instrumentation. 6.  Enlarged multinodular thyroid gland.\"    2. Non-muscle invasive bladder cancer:  - 10/3/19: Cystoscopy showed a small area of erythema on retroflexion, possibly pre-existing or due to retroflexion of the scope. Urine cytology from that time showed cells suspicious for malignancy.   - 1/13/20: Bladder biopsy showed high grade urothelial carcinoma in-situ  - 2/12/20-3/18/20: Intravesical BCG therapy  - 7/24/20 and last cystoscopy was on the same day. It was negative. However, urine cytology was positive for high-grade urothelial carcinoma.   - 11/25/20-12/10/2020: 3 weekly doses of intravesical BCG 50mg.   - 12/10/20: Cytology suspicious and FISH urovysion positive for TCC.    REVIEW OF SYSTEMS: 14 point ROS negative other than the symptoms noted above in the HPI.    PAST MEDICAL AND SURGICAL HISTORY:   Past Medical History:   Diagnosis Date     Calculus of kidney      Chemotherapy-induced neutropenia (H) 3/6/2019     Esophageal reflux      GERD (gastroesophageal reflux disease)      Hyperlipidemia LDL goal <130 5/9/2010     Malignant melanoma of skin of trunk, except scrotum (H)      Nonspecific abnormal finding     has living will 2004 -      Nontoxic multinodular goiter     no further eval /tx rec per pt     Osteopenia      Other psoriasis      Personal history of colonic polyps      PMR (polymyalgia rheumatica) (H)      Stress-induced cardiomyopathy      Undiagnosed cardiac murmurs      Unspecified constipation      Unspecified essential hypertension      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     Past Surgical History:   Procedure Laterality Date     ARTHROPLASTY KNEE Right 11/9/2020    Procedure: ARTHROPLASTY, KNEE, TOTAL, RIGHT;  Surgeon: Edward Otero MD;  [facility]     BIOPSY "       COLONOSCOPY  2014     COMBINED CYSTOSCOPY, INSERT STENT URETER(S) Bilateral 9/12/2018    Procedure: COMBINED CYSTOSCOPY, INSERT STENT URETER(S);  Cystoscopy Bilateral ureteral Stent Placement.;  Surgeon: Justin Henry MD;  [facility]     COMBINED CYSTOSCOPY, RETROGRADES, URETEROSCOPY, INSERT STENT Bilateral 4/3/2018    Procedure: COMBINED CYSTOSCOPY, RETROGRADES, URETEROSCOPY, INSERT STENT;;  Surgeon: Stuart King MD;  [facility]     COMBINED CYSTOSCOPY, RETROGRADES, URETEROSCOPY, INSERT STENT Bilateral 9/10/2018    Procedure: COMBINED CYSTOSCOPY, RETROGRADES, URETEROSCOPY, INSERT STENT;  Cystoscopy, Bilateral Ureteroscopy, Bladder Biopsies, Retrogram Pyelograms, Ureteral Washings and brushings, cysview;  Surgeon: Stuart King MD;  [facility]     COMBINED CYSTOSCOPY, RETROGRADES, URETEROSCOPY, INSERT STENT Left 8/26/2020    Procedure: Cystoscopy, Left Ureteral Wash, Left ureteral Retrograde Pyelogram;  Surgeon: Justin Henry MD;  [facility]     CYSTOSCOPY, BIOPSY BLADDER INSTILL OPTICAL AGENT N/A 4/3/2018    Procedure: CYSTOSCOPY, BIOPSY BLADDER INSTILL OPTICAL AGENT;  Cystoscopy, Blue Light Cystoscopy, Bladder Biopsies, Bilateral Selective ureteral washings for Cytology, Bilateral Retrograde Pyelograms, Bilateral Ureteroscopy;  Surgeon: Stuart King MD;  [facility]     CYSTOSCOPY, BIOPSY BLADDER, COMBINED N/A 2/19/2018    Procedure: COMBINED CYSTOSCOPY, BIOPSY BLADDER;  Cystoscopy, Bladder Biopsy;  Surgeon: Kenna La MD;  [facility]     CYSTOSCOPY, BIOPSY BLADDER, COMBINED N/A 8/26/2020    Procedure: Cystoscopy, biopsy bladder, combined;  Surgeon: Justin Henry MD;  [facility]     CYSTOSCOPY, FULGURATE BLADDER TUMOR, COMBINED N/A 1/13/2020    Procedure: CYSTOSCOPY, WITH  bladder biopsies and fulguration;  Surgeon: Stuart King MD;  [facility]     CYSTOSCOPY, REMOVE STENT(S),  COMBINED  11/13/2018    Procedure: Flexible Cystoscopy with Stent Removal;  Surgeon: Stuart King MD;  [facility]     DAVINCI LYMPHADENECTOMY N/A 11/13/2018    Procedure: Davinci Lymphadenectomy ;  Surgeon: Stuart King MD;  [facility]     DAVINCI NEPHROURETERECTOMY N/A 11/13/2018    Procedure: Right DaVinci Assisted Nephroureterectomy;  Surgeon: Stuart King MD;  [facility]     ENDOSCOPIC ULTRASOUND LOWER GASTROINTESTIONAL TRACT (GI) N/A 10/30/2015    Procedure: ENDOSCOPIC ULTRASOUND LOWER GASTROINTESTIONAL TRACT (GI);  Surgeon: Daniel Jean-Baptiste MD;  [facility]     EYE SURGERY  12/4/17     INSERT PORT VASCULAR ACCESS Right 12/19/2018    Procedure: Chest Port Placement - right;  Surgeon: Stuart Chavez PA-C;  [facility]     IR CHEST PORT PLACEMENT > 5 YRS OF AGE  12/19/2018     IR PORT REMOVAL RIGHT  6/26/2019     LAPAROSCOPIC CHOLECYSTECTOMY WITH CHOLANGIOGRAMS N/A 11/1/2015    Procedure: LAPAROSCOPIC CHOLECYSTECTOMY WITH CHOLANGIOGRAMS;  Surgeon: Tonie Warren MD;  [facility]     REMOVE PORT VASCULAR ACCESS Right 6/26/2019    Procedure: Right Port Removal;  Surgeon: Froilan Irizarry PA-C;  [facility]     SURGICAL HISTORY OF -   1996    malignant melanoma     SURGICAL HISTORY OF -   1968    thyroid nodule     SURGICAL HISTORY OF -       D & C     ZZC NONSPECIFIC PROCEDURE  2005    colonoscopy polyp repeat 2010     SOCIAL HISTORY:   Social History     Tobacco Use     Smoking status: Never Smoker     Smokeless tobacco: Never Used   Substance Use Topics     Alcohol use: No     Alcohol/week: 0.0 standard drinks     Comment: rare     Drug use: No     FAMILY HISTORY:   Family History   Problem Relation Age of Onset     Cancer Father         dec - esophageal and laryngeal     Heart Disease Mother      Respiratory Mother         dec     Breast Cancer Daughter      Other Cancer Daughter      Thyroid Disease Daughter       Asthma Daughter      Hyperlipidemia Son      Diabetes Son      Anesthesia Reaction No family hx of      Deep Vein Thrombosis (DVT) No family hx of      ALLERGIES:   Allergies   Allergen Reactions     Codeine      CURRENT MEDICATIONS:   Current Outpatient Medications:      acetaminophen (TYLENOL) 325 MG tablet, Take 3 tablets (975 mg) by mouth every 8 hours as needed for pain, Disp: 1 Bottle, Rfl: 0     alendronate (FOSAMAX) 70 MG tablet, TAKE 1 TABLET EVERY 7 DAYS AT LEAST 60 MINUTES BEFORE BREAKFAST AS DIRECTED., Disp: 12 tablet, Rfl: 3     calcium carbonate-vitamin D (OS-SHREYA) 500-400 MG-UNIT tablet, Take 1 tablet by mouth daily, Disp: , Rfl:      cycloSPORINE (RESTASIS) 0.05 % ophthalmic emulsion, Place 1 drop into both eyes 2 times daily, Disp: , Rfl:      ferrous sulfate 325 (65 Fe) MG TBEC EC tablet, Take 325 mg by mouth every morning , Disp: , Rfl:      furosemide (LASIX) 20 MG tablet, TAKE 1 TABLET (20 MG) BY MOUTH EVERY MORNING, Disp: 90 tablet, Rfl: 3     irbesartan (AVAPRO) 300 MG tablet, TAKE 1 TABLET EVERY DAY, Disp: 90 tablet, Rfl: 2     levofloxacin (LEVAQUIN) 500 MG tablet, Take one tablet 6 hours after treatment and one tablet the following morning after treatment., Disp: 6 tablet, Rfl: 0     lovastatin (MEVACOR) 40 MG tablet, Take 1 tablet (40 mg) by mouth At Bedtime, Disp: 90 tablet, Rfl: 3     melatonin 5 MG tablet, Take 5 mg by mouth At Bedtime, Disp: , Rfl:      methimazole (TAPAZOLE) 5 MG tablet, Take 1 tablet (5 mg) by mouth daily, Disp: 90 tablet, Rfl: 3     Omega-3 Fatty Acids (FISH OIL) 500 MG CAPS, Take 1 capsule by mouth every morning , Disp: , Rfl:      omeprazole (PRILOSEC) 20 MG DR capsule, TAKE 1 CAPSULE EVERY DAY, Disp: 90 capsule, Rfl: 1     oxyCODONE (ROXICODONE) 5 MG tablet, Take 1 tablet (5 mg) by mouth every 4 hours as needed for moderate to severe pain, Disp: 20 tablet, Rfl: 0     polyethylene glycol (MIRALAX) 17 g packet, Take 17 g by mouth daily, Disp: 10 packet, Rfl: 0      Probiotic Product (PROBIOTIC ADVANCED PO), Take 1 capsule by mouth every morning , Disp: , Rfl:      propranolol ER (INDERAL LA) 60 MG 24 hr capsule, Take 1 capsule (60 mg) by mouth daily, Disp: 90 capsule, Rfl: 2     rOPINIRole (REQUIP) 0.25 MG tablet, Take 2 tablets (0.5 mg) by mouth every afternoon at 4 PM, and take 1 tablet (0.25 mg) by mouth at bedtime daily., Disp: , Rfl:      senna-docusate (SENOKOT-S/PERICOLACE) 8.6-50 MG tablet, Take 1-2 tablets by mouth 2 times daily as needed for constipation, Disp: 30 tablet, Rfl: 0     sertraline (ZOLOFT) 100 MG tablet, TAKE 1 TABLET EVERY MORNING, Disp: 90 tablet, Rfl: 0     traZODone (DESYREL) 50 MG tablet, TAKE 1/2 TABLET AT BEDTIME, Disp: 45 tablet, Rfl: 2     hydrOXYzine (ATARAX) 10 MG tablet, Take 1 tablet (10 mg) by mouth every 6 hours as needed for itching or other (adjuvant pain) (Patient not taking: Reported on 2/23/2021), Disp: 10 tablet, Rfl: 0     ondansetron (ZOFRAN-ODT) 4 MG ODT tab, Take 1 tablet (4 mg) by mouth every 6 hours as needed for nausea or vomiting (Patient not taking: Reported on 2/23/2021), Disp: 20 tablet, Rfl: 0    PHYSICAL EXAMINATION:  Deferred. Phone visit due to COVID.    LABORATORY DATA:   Recent Labs   Lab Test 01/28/21  1340 01/12/21  1230 11/11/20  0617 11/10/20  0654 11/10/20  0654 10/28/20  1244 10/28/20  1244 09/03/20  1213 07/20/20  1828 07/20/20  1828   WBC  --  6.2  --   --   --   --  6.4 11.2*   < > 7.1   RBC  --  3.79*  --   --   --   --  4.14 4.08   < > 4.04   HGB  --  11.7 9.9*  --  10.3*  --  13.0 13.0   < > 12.9   HCT  --  38.0  --   --   --   --  40.4 40.2   < > 39.8   MCV  --  100  --   --   --   --  98 99   < > 99   MCH  --  30.9  --   --   --   --  31.4 31.9   < > 31.9   MCHC  --  30.8*  --   --   --   --  32.2 32.3   < > 32.4   RDW  --  12.9  --   --   --   --  13.2 12.8   < > 12.6   PLT  --  171  --   --   --   --  218 224   < > 224   NEUTROPHIL  --  67.6  --   --   --   --   --  77.6  --  64.3    133 134   <  > 133  --   --  138   < > 134   POTASSIUM 4.4 5.6* 4.2   < > 4.2   < >  --  4.4   < > 4.8   CHLORIDE 103 100 105   < > 101  --   --  104   < > 101   CO2 26 31 25   < > 28  --   --  30   < > 30   ANIONGAP 7 2* 4   < > 4  --   --  4   < > 3   GLC 93 110* 89   < > 99   < >  --  93   < > 90   BUN 20 22 16   < > 21  --   --  23   < > 25   CR 1.02 0.97 1.06*   < > 1.25*   < >  --  1.03   < > 0.98   SHREYA 10.1 8.6 7.7*   < > 8.1*  --   --  9.0   < > 8.8   PROTTOTAL 7.8 6.8  --   --   --   --   --  7.5   < > 7.6   ALBUMIN 3.9 3.2*  --   --   --   --   --  3.6   < > 3.7   BILITOTAL 0.4 0.4  --   --   --   --   --  0.4   < > 0.5   ALKPHOS 86 91  --   --   --   --   --  97   < > 90   AST 16 16  --   --   --   --   --  16   < > 30   ALT 21 18  --   --   --   --   --  20   < > 21    < > = values in this interval not displayed.     IMAGING STUDIES:  As above.     ASSESSMENT AND PLAN: Ms. 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    1. Upper tract urothelial cancer:   - She completed 4 cycles of adjuvant carboplatin plus gemcitabine chemotherapy per POUT trial. No residual side effects or evidence of recurrence.  - Reviewed restaging CT scan from Jan and March 2021 that showed no clear evidence of disease recurrence in abd/pelvis.  - Lung findings are non-specific but I explained that there's a small possibility of these being due to cancer recurrence. However, the nodules are tiny and have remained stable x 2 months. She has no clinical evidence of disease recurrence.  - At this time, the best option is to continue active surveillance as the patient undergoes an evaluation for cardiogenic etiologies for her HERMAN. She's aware that this involves close monitoring for new or worsening signs or symptoms such as shortness of breath,  chest pain, abdominal pain, unexplained weight loss, hematuria etc.  - Repeat CT C/A/P without contrast in 2 months.     2. High grade urothelial carcinoma in situ:  - Bx proven carcinoma in situ in 2020, completed the first round of intravesical BCG treatment 2020-3/2020 and second in 2020-2020.  - CT findings of bladder inflammation consistent with recent BCG treatment. No new clinical signs/symptoms.  - Urine cytology positive again in end Dec 2020. Could be a candidate for mitogel v nivo+TRINI clinical trial (PI: Joseph Dunn MD).    - Plan for a repeat cysto in end . Dr. Henry managing.     3. Chemo induced anemia and thrombocytopenia:  - Resolved.     4. Worsening HERMAN:  - Have referred her back to Dr. Griffith in Cardiology for her h/o moderate AORTIC STENOSIS, CHF, and Afib now with worsening SOA and lymphedema with no cancer-related etiology evident.     Return to clinic in 2 months with restaging scans.    All of the above was explained to the patient in lay language and several questions were answered. They are in agreement with the plan.    BILLIN - phone visit duration - 13 mins    Jaden Toledo M.D.  . Professor of Medicine  Genitourinary Oncology  Division of Hematology, Oncology & Transplantation  [facility]

## 2021-04-13 NOTE — PROGRESS NOTES
Outpatient Speech Language Therapy Evaluation  PLAN OF TREATMENT FOR OUTPATIENT REHABILITATION  (COMPLETE FOR INITIAL CLAIMS ONLY)  Patient's Last Name, First Name, M.I.  YOB: 1933  Dimple Oviedo                        Provider's Name  Deysi Casillas Medical Record No.  [id number]                               Onset Date:  6/03/19   Start of Care Date: 6/03/19     Type: Speech Language Therapy Medical Diagnosis: chronic cough.                        Therapy Diagnosis:  Chronic cough   Visits from SOC:  1   _________________________________________________________________________________  Plan of Treatment:   Speech therapy    DURATION/FREQUENCY OF TREATMENT  Six weekly or bi-weekly, one-hour sessions, with three monthly one-hour follow-up sessions    PROGNOSIS  Good prognosis for voice improvement with speech therapy and regular practice of therapeutic activities.    BARRIERS TO LEARNING/TEACHING AND LEARNING NEEDS  None/Unremarkable    Goals:  Patient goal:   1. To understand the problem and fix it as much as possible  2. To have a normal and acceptable voice quality  3. To reduce her cough and throat clearing to acceptable levels    Long-term goal(s): In six months, Ms. Oviedo will:  1. Report a week of typical vocal activities, in which dysphonia and vocal effort do not exceed a level of 2 out of 10, 80% of the time   2.  Report a week with no more than two episodes of coughing or throat-clearing per day, that do not last more than two seconds  _________________________________________________________________________________    I CERTIFY THE NEED FOR THESE SERVICES FURNISHED UNDER        THIS PLAN OF TREATMENT AND WHILE UNDER MY CARE     (Physician attestation of this document indicates review and certification of the therapy plan).     Certification date from: 6/03/19  Certification date to: 9/1/19    Referring  Provider: Referred Self      Protopic Counseling: Patient may experience a mild burning sensation during topical application. Protopic is not approved in children less than 2 years of age. There have been case reports of hematologic and skin malignancies in patients using topical calcineurin inhibitors although causality is questionable.

## 2021-04-26 ENCOUNTER — OFFICE VISIT (OUTPATIENT)
Dept: CARDIOLOGY | Facility: CLINIC | Age: 86
End: 2021-04-26
Attending: INTERNAL MEDICINE
Payer: MEDICARE

## 2021-04-26 VITALS
HEART RATE: 66 BPM | OXYGEN SATURATION: 96 % | BODY MASS INDEX: 36.68 KG/M2 | WEIGHT: 170 LBS | DIASTOLIC BLOOD PRESSURE: 57 MMHG | SYSTOLIC BLOOD PRESSURE: 147 MMHG | HEIGHT: 57 IN

## 2021-04-26 DIAGNOSIS — I48.0 PAROXYSMAL ATRIAL FIBRILLATION (H): Primary | ICD-10-CM

## 2021-04-26 PROCEDURE — 99214 OFFICE O/P EST MOD 30 MIN: CPT | Performed by: INTERNAL MEDICINE

## 2021-04-26 PROCEDURE — 93005 ELECTROCARDIOGRAM TRACING: CPT

## 2021-04-26 PROCEDURE — G0463 HOSPITAL OUTPT CLINIC VISIT: HCPCS | Mod: 25

## 2021-04-26 ASSESSMENT — PAIN SCALES - GENERAL: PAINLEVEL: NO PAIN (0)

## 2021-04-26 ASSESSMENT — MIFFLIN-ST. JEOR: SCORE: 1083.95

## 2021-04-26 NOTE — NURSING NOTE
Chief Complaint   Patient presents with     Follow Up     Late follow-up. AF. Discuss AC. SOB     Vitals were taken, medications reconciled and EKG performed.     TRACE Rubi  11:26 AM

## 2021-04-26 NOTE — LETTER
4/26/2021      RE: Dimple Oviedo  [address]  [facility] [address]       Dear Colleague,    Thank you for the opportunity to participate in the care of your patient, Dimple Oviedo, at the [facility] [place] at [facility]. Please see a copy of my visit note below.    HPI:     Mrs. Oviedo is a very pleasant 87-year-old woman who has a complex past medical history including bladder malignancy, kidney removal, and hypertension, GERD and history of atrial fibrillation with rapid rates.  At one point she also had a stress induced cardiomyopathy with diminished ejection fraction but that has resolved.  Her echocardiogram in September 2018 showed normal left ventricular dimensions with an ejection fraction of 30 to 35%.  Subsequently however the ejection fraction has improved to normal range.    Since our last visit which focused on concerns related to the use of anticoagulants given her atrial fibrillation and her upcoming surgery (knee), she has recovered well and has had no significant intervening illnesses.  She comes today for us to revisit the role of anticoagulation in her future care.    Today I did explain to Mrs. Oviedo that typically we would recommend anticoagulation for patients with atrial fibrillation in her age group.  There was however concern about bladder malignancy and potential for bleeding.  However it appears that the latter has been largely resolved and she has not had any bleeding issues in recent times.  I did indicate that she should consider the bleeding risk in the setting of potential stroke risk.  She indicated that she would be favoring anticoagulation and would stop drug should bleeding recur.     We will start low-dose rivaroxaban (15 mg).  Patient has been advised to stop the medication should she have any recurrence of bladder bleeding.  She voiced understanding.    Apart from the issues noted above I was  able to tell Mrs. Oviedo that her rhythm today was sinus with no acute changes.    Patient did indicate that she does feel more short of breath now that she is wearing a Covid mask all of the time.  She does have some lower extremity lymphedema but does not had any other signs suggesting heart failure.    Her last echocardiogram was in September 2020 and is reprinted below:    Interpretation Summary  Global and regional left ventricular function is normal with an EF of 60-65%.  Grade II or moderate diastolic dysfunction.  The right ventricle is normal size.Global right ventricular function is  normal.  Moderate aortic stenosis is present. Mild aortic insufficiency is present.  This study was compared with the study from 09/11/2018. There has been no  change.   Left Ventricle  Global and regional left ventricular function is normal with an EF of 60-65%.  Left ventricular wall thickness is normal. Left ventricular size is normal.  Grade II or moderate diastolic dysfunction.       PAST MEDICAL HISTORY:  Past Medical History:   Diagnosis Date     Calculus of kidney      Chemotherapy-induced neutropenia (H) 3/6/2019     Esophageal reflux      GERD (gastroesophageal reflux disease)      Hyperlipidemia LDL goal <130 5/9/2010     Malignant melanoma of skin of trunk, except scrotum (H)      Nonspecific abnormal finding     has living will 2004 -      Nontoxic multinodular goiter     no further eval /tx rec per pt     Osteopenia      Other psoriasis      Personal history of colonic polyps      PMR (polymyalgia rheumatica) (H)      Stress-induced cardiomyopathy      Undiagnosed cardiac murmurs      Unspecified constipation      Unspecified essential hypertension      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 12 tablet 3     calcium carbonate-vitamin D (OS-SHREYA) 500-400 MG-UNIT tablet Take 1 tablet by mouth daily       cycloSPORINE (RESTASIS) 0.05 % ophthalmic emulsion Place 1 drop into both eyes 2 times daily       ferrous sulfate 325 (65 Fe) MG TBEC EC tablet Take 325 mg by mouth every morning        furosemide (LASIX) 20 MG tablet TAKE 1 TABLET (20 MG) BY MOUTH EVERY MORNING 90 tablet 3     irbesartan (AVAPRO) 300 MG tablet TAKE 1 TABLET EVERY DAY 90 tablet 2     levofloxacin (LEVAQUIN) 500 MG tablet Take one tablet 6 hours after treatment and one tablet the following morning after treatment. 6 tablet 0     lovastatin (MEVACOR) 40 MG tablet Take 1 tablet (40 mg) by mouth At Bedtime 90 tablet 3     melatonin 5 MG tablet Take 5 mg by mouth At Bedtime       methimazole (TAPAZOLE) 5 MG tablet Take 1 tablet (5 mg) by mouth daily 90 tablet 3     Omega-3 Fatty Acids (FISH OIL) 500 MG CAPS Take 1 capsule by mouth every morning        omeprazole (PRILOSEC) 20 MG DR capsule TAKE 1 CAPSULE EVERY DAY 90 capsule 1     oxyCODONE (ROXICODONE) 5 MG tablet Take 1 tablet (5 mg) by mouth every 4 hours as needed for moderate to severe pain 20 tablet 0     polyethylene glycol (MIRALAX) 17 g packet Take 17 g by mouth daily 10 packet 0     Probiotic Product (PROBIOTIC ADVANCED PO) Take 1 capsule by mouth every morning        propranolol ER (INDERAL LA) 60 MG 24 hr capsule Take 1 capsule (60 mg) by mouth daily 90 capsule 2     rOPINIRole (REQUIP) 0.25 MG tablet TAKE 1 TABLET IN THE AFTERNOON AND TAKE 2 TABLETS EVERY NIGHT 270 tablet 3     senna-docusate (SENOKOT-S/PERICOLACE) 8.6-50 MG tablet Take 1-2 tablets by mouth 2 times daily as needed for constipation 30 tablet 0     sertraline (ZOLOFT) 100 MG tablet TAKE 1 TABLET EVERY MORNING 90 tablet 0     traZODone (DESYREL) 50 MG tablet TAKE 1/2 TABLET AT BEDTIME 45 tablet 2     hydrOXYzine  (ATARAX) 10 MG tablet Take 1 tablet (10 mg) by mouth every 6 hours as needed for itching or other (adjuvant pain) (Patient not taking: Reported on 2/23/2021) 10 tablet 0     ondansetron (ZOFRAN-ODT) 4 MG ODT tab Take 1 tablet (4 mg) by mouth every 6 hours as needed for nausea or vomiting (Patient not taking: Reported on 2/23/2021) 20 tablet 0       PAST SURGICAL HISTORY:  Past Surgical History:   Procedure Laterality Date     ARTHROPLASTY KNEE Right 11/9/2020    Procedure: ARTHROPLASTY, KNEE, TOTAL, RIGHT;  Surgeon: Edward Otero MD;  [facility]     BIOPSY       COLONOSCOPY  2014     COMBINED CYSTOSCOPY, INSERT STENT URETER(S) Bilateral 9/12/2018    Procedure: COMBINED CYSTOSCOPY, INSERT STENT URETER(S);  Cystoscopy Bilateral ureteral Stent Placement.;  Surgeon: Justin Henry MD;  [facility]     COMBINED CYSTOSCOPY, RETROGRADES, URETEROSCOPY, INSERT STENT Bilateral 4/3/2018    Procedure: COMBINED CYSTOSCOPY, RETROGRADES, URETEROSCOPY, INSERT STENT;;  Surgeon: Stuart King MD;  [facility]     COMBINED CYSTOSCOPY, RETROGRADES, URETEROSCOPY, INSERT STENT Bilateral 9/10/2018    Procedure: COMBINED CYSTOSCOPY, RETROGRADES, URETEROSCOPY, INSERT STENT;  Cystoscopy, Bilateral Ureteroscopy, Bladder Biopsies, Retrogram Pyelograms, Ureteral Washings and brushings, cysview;  Surgeon: Stuart King MD;  [facility]     COMBINED CYSTOSCOPY, RETROGRADES, URETEROSCOPY, INSERT STENT Left 8/26/2020    Procedure: Cystoscopy, Left Ureteral Wash, Left ureteral Retrograde Pyelogram;  Surgeon: Justin Henry MD;  [facility]     CYSTOSCOPY, BIOPSY BLADDER INSTILL OPTICAL AGENT N/A 4/3/2018    Procedure: CYSTOSCOPY, BIOPSY BLADDER INSTILL OPTICAL AGENT;  Cystoscopy, Blue Light Cystoscopy, Bladder Biopsies, Bilateral Selective ureteral washings for Cytology, Bilateral Retrograde Pyelograms, Bilateral Ureteroscopy;  Surgeon: Stuart King MD;  [facility]      CYSTOSCOPY, BIOPSY BLADDER, COMBINED N/A 2/19/2018    Procedure: COMBINED CYSTOSCOPY, BIOPSY BLADDER;  Cystoscopy, Bladder Biopsy;  Surgeon: Kenna La MD;  [facility]     CYSTOSCOPY, BIOPSY BLADDER, COMBINED N/A 8/26/2020    Procedure: Cystoscopy, biopsy bladder, combined;  Surgeon: Justin Henry MD;  [facility]     CYSTOSCOPY, FULGURATE BLADDER TUMOR, COMBINED N/A 1/13/2020    Procedure: CYSTOSCOPY, WITH  bladder biopsies and fulguration;  Surgeon: Stuart King MD;  [facility]     CYSTOSCOPY, REMOVE STENT(S), COMBINED  11/13/2018    Procedure: Flexible Cystoscopy with Stent Removal;  Surgeon: Stuart King MD;  [facility]     DAVINCI LYMPHADENECTOMY N/A 11/13/2018    Procedure: Davinci Lymphadenectomy ;  Surgeon: Stuart King MD;  [facility]     DAVINCI NEPHROURETERECTOMY N/A 11/13/2018    Procedure: Right DaVinci Assisted Nephroureterectomy;  Surgeon: Stuart King MD;  [facility]     ENDOSCOPIC ULTRASOUND LOWER GASTROINTESTIONAL TRACT (GI) N/A 10/30/2015    Procedure: ENDOSCOPIC ULTRASOUND LOWER GASTROINTESTIONAL TRACT (GI);  Surgeon: Daniel Jean-Baptiste MD;  [facility]     EYE SURGERY  12/4/17     INSERT PORT VASCULAR ACCESS Right 12/19/2018    Procedure: Chest Port Placement - right;  Surgeon: Stuart Chavez PA-C;  [facility]     IR CHEST PORT PLACEMENT > 5 YRS OF AGE  12/19/2018     IR PORT REMOVAL RIGHT  6/26/2019     LAPAROSCOPIC CHOLECYSTECTOMY WITH CHOLANGIOGRAMS N/A 11/1/2015    Procedure: LAPAROSCOPIC CHOLECYSTECTOMY WITH CHOLANGIOGRAMS;  Surgeon: Tonie Warren MD;  [facility]     REMOVE PORT VASCULAR ACCESS Right 6/26/2019    Procedure: Right Port Removal;  Surgeon: Froilan Irizarry PA-C;  [facility]     SURGICAL HISTORY OF -   1996    malignant melanoma     SURGICAL HISTORY OF -   1968    thyroid nodule     SURGICAL HISTORY OF -       D & C     [facility]  "NONSPECIFIC PROCEDURE  2005    colonoscopy polyp repeat 2010       ALLERGIES:     Allergies   Allergen Reactions     Codeine        FAMILY HISTORY:  Family History   Problem Relation Age of Onset     Cancer Father         dec - esophageal and laryngeal     Heart Disease Mother      Respiratory Mother         dec     Breast Cancer Daughter      Other Cancer Daughter      Thyroid Disease Daughter      Asthma Daughter      Hyperlipidemia Son      Diabetes Son      Anesthesia Reaction No family hx of      Deep Vein Thrombosis (DVT) No family hx of          SOCIAL HISTORY:  Social History     Tobacco Use     Smoking status: Never Smoker     Smokeless tobacco: Never Used   Substance Use Topics     Alcohol use: No     Alcohol/week: 0.0 standard drinks     Comment: rare     Drug use: No       ROS:   Constitutional: No fever, chills, or sweats. Weight stable.   ENT: No visual disturbance, ear ache, epistaxis, sore throat.   Cardiovascular: As per HPI.   Respiratory: No cough, hemoptysis.    GI: No nausea, vomiting, hematemesis, melena, or hematochezia.   : No hematuria.   Integument: Negative.   Psychiatric: Negative.   Hematologic:  Easy bruising, no easy bleeding.  Neuro: Negative.   Endocrinology: No significant heat or cold intolerance   Musculoskeletal: No myalgia.    Exam:  BP (!) 147/57 (BP Location: Right arm, Patient Position: Supine, Cuff Size: Adult Large)   Pulse 66   Ht 1.454 m (4' 9.25\")   Wt 77.1 kg (170 lb)   SpO2 96%   BMI 36.47 kg/m    GENERAL APPEARANCE: healthy, alert and no distress  HEENT: no icterus, no xanthelasmas, normal pupil size and reaction, normal palate, mucosa moist, no central cyanosis  NECK: no adenopathy, no asymmetry, masses, or scars, thyroid normal to palpation and no bruits, JVP not elevated  RESPIRATORY: lungs clear to auscultation - no rales, rhonchi or wheezes, no use of accessory muscles, no retractions, respirations are unlabored, normal respiratory rate  CARDIOVASCULAR: " regular rhythm, normal S1 with physiologic split S2, no S3 or S4 and no murmur, click or rub, precordium quiet with normal PMI.  ABDOMEN: soft, non tender, without hepatosplenomegaly, no masses palpable, bowel sounds normal, aorta not enlarged by palpation, no abdominal bruits  EXTREMITIES: peripheral pulses normal, no edema, no bruits  NEURO: alert and oriented to person/place/time, normal speech, gait and affect  VASC: Radial, femoral, dorsalis pedis and posterior tibialis pulses are normal in volumes and symmetric bilaterally. No bruits are heard.  SKIN: no ecchymoses, no rashes    Labs:  CBC RESULTS:   Lab Results   Component Value Date    WBC 6.2 01/12/2021    RBC 3.79 (L) 01/12/2021    HGB 11.7 01/12/2021    HCT 38.0 01/12/2021     01/12/2021    MCH 30.9 01/12/2021    MCHC 30.8 (L) 01/12/2021    RDW 12.9 01/12/2021     01/12/2021       BMP RESULTS:  Lab Results   Component Value Date     01/28/2021    POTASSIUM 4.4 01/28/2021    CHLORIDE 103 01/28/2021    CO2 26 01/28/2021    ANIONGAP 7 01/28/2021    GLC 93 01/28/2021    BUN 20 01/28/2021    CR 1.02 01/28/2021    GFRESTIMATED 49 (L) 01/28/2021    GFRESTBLACK 57 (L) 01/28/2021    SHREYA 10.1 01/28/2021        INR RESULTS:  Lab Results   Component Value Date    INR 0.95 07/20/2020    INR 0.99 06/26/2019    INR 0.97 02/07/2019    INR 0.91 12/19/2018       Procedures:  PULMONARY FUNCTION TESTS:   No flowsheet data found.      ECHOCARDIOGRAM:   See HPI for summary      Assessment and Plan:   1.  Atrial fibrillation-paroxysmal  2.  Past history of stress cardiomyopathy now resolved  3.  Recent knee surgery (right side) fully recovered    Plan  1.  Initiate low-dose rivaroxaban after discussion which is summarized above.  Patient knows to terminate should bladder bleeding resume or other bleeding becomes evident  2.  Follow-up video/telephone visit in approximately 6 weeks to assess whether she is tolerating anticoagulation  3.  Follow-up in 1 years  time or earlier for her routine check    Total time of visit including documentation 30 minutes    I very much appreciated the opportunity to see and assess Dimple Oviedo in the clinic today . Please do not hesitate to contact my office if you have any questions or concerns.      Butch Griffith MD  Cardiac Arrhythmia Service  [facility]  [phone]      CC  BUTCH GRIFFITH

## 2021-04-26 NOTE — PATIENT INSTRUCTIONS
You were seen in the Electrophysiology Clinic today by: Butch Griffith MD    Plan:     Medication Changes:     Start Xarelto at 15mg daily    Labs/Tests Needed:    Follow up visit:    6 weeks video or telephone     Further Instructions:    If you have any urinary bleeding on Xarelto, please stop it and let us know.       Your Care Team:  [facility]   Telephone Number     Mily Moser RN [phone]     For scheduling appts or procedures:    Lenora Ramos   [phone]   For the Device Clinic (Pacemakers, ICDs, Loop Recorders)    During business hours: [phone]  After business hours:   [phone]- select option 4 and ask for job code [id number].     On-call cardiologist for after hours or on weekends: [phone], option #4, and ask to speak to the on-call cardiologist.     Cardiovascular Clinic:   [address] [place] [address]. [place] [address]      As always, Thank you for trusting us with your health care needs!

## 2021-04-26 NOTE — PROGRESS NOTES
HPI:     Mrs. Oviedo is a very pleasant 87-year-old woman who has a complex past medical history including bladder malignancy, kidney removal, and hypertension, GERD and history of atrial fibrillation with rapid rates.  At one point she also had a stress induced cardiomyopathy with diminished ejection fraction but that has resolved.  Her echocardiogram in September 2018 showed normal left ventricular dimensions with an ejection fraction of 30 to 35%.  Subsequently however the ejection fraction has improved to normal range.    Since our last visit which focused on concerns related to the use of anticoagulants given her atrial fibrillation and her upcoming surgery (knee), she has recovered well and has had no significant intervening illnesses.  She comes today for us to revisit the role of anticoagulation in her future care.    Today I did explain to Mrs. Oviedo that typically we would recommend anticoagulation for patients with atrial fibrillation in her age group.  There was however concern about bladder malignancy and potential for bleeding.  However it appears that the latter has been largely resolved and she has not had any bleeding issues in recent times.  I did indicate that she should consider the bleeding risk in the setting of potential stroke risk.  She indicated that she would be favoring anticoagulation and would stop drug should bleeding recur.     We will start low-dose rivaroxaban (15 mg).  Patient has been advised to stop the medication should she have any recurrence of bladder bleeding.  She voiced understanding.    Apart from the issues noted above I was able to tell Mrs. Oviedo that her rhythm today was sinus with no acute changes.    Patient did indicate that she does feel more short of breath now that she is wearing a Covid mask all of the time.  She does have some lower extremity lymphedema but does not had any other signs suggesting heart failure.    Her last echocardiogram was in September 2020  and is reprinted below:    Interpretation Summary  Global and regional left ventricular function is normal with an EF of 60-65%.  Grade II or moderate diastolic dysfunction.  The right ventricle is normal size.Global right ventricular function is  normal.  Moderate aortic stenosis is present. Mild aortic insufficiency is present.  This study was compared with the study from 09/11/2018. There has been no  change.   Left Ventricle  Global and regional left ventricular function is normal with an EF of 60-65%.  Left ventricular wall thickness is normal. Left ventricular size is normal.  Grade II or moderate diastolic dysfunction.       PAST MEDICAL HISTORY:  Past Medical History:   Diagnosis Date     Calculus of kidney      Chemotherapy-induced neutropenia (H) 3/6/2019     Esophageal reflux      GERD (gastroesophageal reflux disease)      Hyperlipidemia LDL goal <130 5/9/2010     Malignant melanoma of skin of trunk, except scrotum (H)      Nonspecific abnormal finding     has living will 2004 -      Nontoxic multinodular goiter     no further eval /tx rec per pt     Osteopenia      Other psoriasis      Personal history of colonic polyps      PMR (polymyalgia rheumatica) (H)      Stress-induced cardiomyopathy      Undiagnosed cardiac murmurs      Unspecified constipation      Unspecified essential hypertension      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 12 tablet 3     calcium carbonate-vitamin D (OS-SHREYA) 500-400 MG-UNIT tablet Take 1 tablet by mouth daily       cycloSPORINE (RESTASIS) 0.05 % ophthalmic emulsion Place 1 drop into both eyes 2 times daily       ferrous sulfate 325 (65 Fe) MG TBEC EC tablet  Take 325 mg by mouth every morning        furosemide (LASIX) 20 MG tablet TAKE 1 TABLET (20 MG) BY MOUTH EVERY MORNING 90 tablet 3     irbesartan (AVAPRO) 300 MG tablet TAKE 1 TABLET EVERY DAY 90 tablet 2     levofloxacin (LEVAQUIN) 500 MG tablet Take one tablet 6 hours after treatment and one tablet the following morning after treatment. 6 tablet 0     lovastatin (MEVACOR) 40 MG tablet Take 1 tablet (40 mg) by mouth At Bedtime 90 tablet 3     melatonin 5 MG tablet Take 5 mg by mouth At Bedtime       methimazole (TAPAZOLE) 5 MG tablet Take 1 tablet (5 mg) by mouth daily 90 tablet 3     Omega-3 Fatty Acids (FISH OIL) 500 MG CAPS Take 1 capsule by mouth every morning        omeprazole (PRILOSEC) 20 MG DR capsule TAKE 1 CAPSULE EVERY DAY 90 capsule 1     oxyCODONE (ROXICODONE) 5 MG tablet Take 1 tablet (5 mg) by mouth every 4 hours as needed for moderate to severe pain 20 tablet 0     polyethylene glycol (MIRALAX) 17 g packet Take 17 g by mouth daily 10 packet 0     Probiotic Product (PROBIOTIC ADVANCED PO) Take 1 capsule by mouth every morning        propranolol ER (INDERAL LA) 60 MG 24 hr capsule Take 1 capsule (60 mg) by mouth daily 90 capsule 2     rOPINIRole (REQUIP) 0.25 MG tablet TAKE 1 TABLET IN THE AFTERNOON AND TAKE 2 TABLETS EVERY NIGHT 270 tablet 3     senna-docusate (SENOKOT-S/PERICOLACE) 8.6-50 MG tablet Take 1-2 tablets by mouth 2 times daily as needed for constipation 30 tablet 0     sertraline (ZOLOFT) 100 MG tablet TAKE 1 TABLET EVERY MORNING 90 tablet 0     traZODone (DESYREL) 50 MG tablet TAKE 1/2 TABLET AT BEDTIME 45 tablet 2     hydrOXYzine (ATARAX) 10 MG tablet Take 1 tablet (10 mg) by mouth every 6 hours as needed for itching or other (adjuvant pain) (Patient not taking: Reported on 2/23/2021) 10 tablet 0     ondansetron (ZOFRAN-ODT) 4 MG ODT tab Take 1 tablet (4 mg) by mouth every 6 hours as needed for nausea or vomiting (Patient not taking: Reported on 2/23/2021) 20 tablet 0       PAST  SURGICAL HISTORY:  Past Surgical History:   Procedure Laterality Date     ARTHROPLASTY KNEE Right 11/9/2020    Procedure: ARTHROPLASTY, KNEE, TOTAL, RIGHT;  Surgeon: Edward Otero MD;  [facility]     BIOPSY       COLONOSCOPY  2014     COMBINED CYSTOSCOPY, INSERT STENT URETER(S) Bilateral 9/12/2018    Procedure: COMBINED CYSTOSCOPY, INSERT STENT URETER(S);  Cystoscopy Bilateral ureteral Stent Placement.;  Surgeon: Justin Henry MD;  [facility]     COMBINED CYSTOSCOPY, RETROGRADES, URETEROSCOPY, INSERT STENT Bilateral 4/3/2018    Procedure: COMBINED CYSTOSCOPY, RETROGRADES, URETEROSCOPY, INSERT STENT;;  Surgeon: Stuart King MD;  [facility]     COMBINED CYSTOSCOPY, RETROGRADES, URETEROSCOPY, INSERT STENT Bilateral 9/10/2018    Procedure: COMBINED CYSTOSCOPY, RETROGRADES, URETEROSCOPY, INSERT STENT;  Cystoscopy, Bilateral Ureteroscopy, Bladder Biopsies, Retrogram Pyelograms, Ureteral Washings and brushings, cysview;  Surgeon: Stuart King MD;  [facility]     COMBINED CYSTOSCOPY, RETROGRADES, URETEROSCOPY, INSERT STENT Left 8/26/2020    Procedure: Cystoscopy, Left Ureteral Wash, Left ureteral Retrograde Pyelogram;  Surgeon: Justin Henry MD;  [facility]     CYSTOSCOPY, BIOPSY BLADDER INSTILL OPTICAL AGENT N/A 4/3/2018    Procedure: CYSTOSCOPY, BIOPSY BLADDER INSTILL OPTICAL AGENT;  Cystoscopy, Blue Light Cystoscopy, Bladder Biopsies, Bilateral Selective ureteral washings for Cytology, Bilateral Retrograde Pyelograms, Bilateral Ureteroscopy;  Surgeon: Stuart King MD;  [facility]     CYSTOSCOPY, BIOPSY BLADDER, COMBINED N/A 2/19/2018    Procedure: COMBINED CYSTOSCOPY, BIOPSY BLADDER;  Cystoscopy, Bladder Biopsy;  Surgeon: Kenna La MD;  [facility]     CYSTOSCOPY, BIOPSY BLADDER, COMBINED N/A 8/26/2020    Procedure: Cystoscopy, biopsy bladder, combined;  Surgeon: Justin Henry MD;  [facility]      CYSTOSCOPY, FULGURATE BLADDER TUMOR, COMBINED N/A 1/13/2020    Procedure: CYSTOSCOPY, WITH  bladder biopsies and fulguration;  Surgeon: Stuart King MD;  [facility]     CYSTOSCOPY, REMOVE STENT(S), COMBINED  11/13/2018    Procedure: Flexible Cystoscopy with Stent Removal;  Surgeon: Stuart King MD;  [facility]     DAVINCI LYMPHADENECTOMY N/A 11/13/2018    Procedure: Davinci Lymphadenectomy ;  Surgeon: Stuart King MD;  [facility]     DAVINCI NEPHROURETERECTOMY N/A 11/13/2018    Procedure: Right DaVinci Assisted Nephroureterectomy;  Surgeon: Stuart King MD;  [facility]     ENDOSCOPIC ULTRASOUND LOWER GASTROINTESTIONAL TRACT (GI) N/A 10/30/2015    Procedure: ENDOSCOPIC ULTRASOUND LOWER GASTROINTESTIONAL TRACT (GI);  Surgeon: Daniel Jean-Baptiste MD;  [facility]     EYE SURGERY  12/4/17     INSERT PORT VASCULAR ACCESS Right 12/19/2018    Procedure: Chest Port Placement - right;  Surgeon: Stuart Chavez PA-C;  [facility]     IR CHEST PORT PLACEMENT > 5 YRS OF AGE  12/19/2018     IR PORT REMOVAL RIGHT  6/26/2019     LAPAROSCOPIC CHOLECYSTECTOMY WITH CHOLANGIOGRAMS N/A 11/1/2015    Procedure: LAPAROSCOPIC CHOLECYSTECTOMY WITH CHOLANGIOGRAMS;  Surgeon: Tonie Warren MD;  [facility]     REMOVE PORT VASCULAR ACCESS Right 6/26/2019    Procedure: Right Port Removal;  Surgeon: Froilan Irizarry PA-C;  [facility]     SURGICAL HISTORY OF -   1996    malignant melanoma     SURGICAL HISTORY OF -   1968    thyroid nodule     SURGICAL HISTORY OF -       D & C     [facility] NONSPECIFIC PROCEDURE  2005    colonoscopy polyp repeat 2010       ALLERGIES:     Allergies   Allergen Reactions     Codeine        FAMILY HISTORY:  Family History   Problem Relation Age of Onset     Cancer Father         dec - esophageal and laryngeal     Heart Disease Mother      Respiratory Mother         dec     Breast Cancer Daughter      Other  "Cancer Daughter      Thyroid Disease Daughter      Asthma Daughter      Hyperlipidemia Son      Diabetes Son      Anesthesia Reaction No family hx of      Deep Vein Thrombosis (DVT) No family hx of          SOCIAL HISTORY:  Social History     Tobacco Use     Smoking status: Never Smoker     Smokeless tobacco: Never Used   Substance Use Topics     Alcohol use: No     Alcohol/week: 0.0 standard drinks     Comment: rare     Drug use: No       ROS:   Constitutional: No fever, chills, or sweats. Weight stable.   ENT: No visual disturbance, ear ache, epistaxis, sore throat.   Cardiovascular: As per HPI.   Respiratory: No cough, hemoptysis.    GI: No nausea, vomiting, hematemesis, melena, or hematochezia.   : No hematuria.   Integument: Negative.   Psychiatric: Negative.   Hematologic:  Easy bruising, no easy bleeding.  Neuro: Negative.   Endocrinology: No significant heat or cold intolerance   Musculoskeletal: No myalgia.    Exam:  BP (!) 147/57 (BP Location: Right arm, Patient Position: Supine, Cuff Size: Adult Large)   Pulse 66   Ht 1.454 m (4' 9.25\")   Wt 77.1 kg (170 lb)   SpO2 96%   BMI 36.47 kg/m    GENERAL APPEARANCE: healthy, alert and no distress  HEENT: no icterus, no xanthelasmas, normal pupil size and reaction, normal palate, mucosa moist, no central cyanosis  NECK: no adenopathy, no asymmetry, masses, or scars, thyroid normal to palpation and no bruits, JVP not elevated  RESPIRATORY: lungs clear to auscultation - no rales, rhonchi or wheezes, no use of accessory muscles, no retractions, respirations are unlabored, normal respiratory rate  CARDIOVASCULAR: regular rhythm, normal S1 with physiologic split S2, no S3 or S4 and no murmur, click or rub, precordium quiet with normal PMI.  ABDOMEN: soft, non tender, without hepatosplenomegaly, no masses palpable, bowel sounds normal, aorta not enlarged by palpation, no abdominal bruits  EXTREMITIES: peripheral pulses normal, no edema, no bruits  NEURO: alert " and oriented to person/place/time, normal speech, gait and affect  VASC: Radial, femoral, dorsalis pedis and posterior tibialis pulses are normal in volumes and symmetric bilaterally. No bruits are heard.  SKIN: no ecchymoses, no rashes    Labs:  CBC RESULTS:   Lab Results   Component Value Date    WBC 6.2 01/12/2021    RBC 3.79 (L) 01/12/2021    HGB 11.7 01/12/2021    HCT 38.0 01/12/2021     01/12/2021    MCH 30.9 01/12/2021    MCHC 30.8 (L) 01/12/2021    RDW 12.9 01/12/2021     01/12/2021       BMP RESULTS:  Lab Results   Component Value Date     01/28/2021    POTASSIUM 4.4 01/28/2021    CHLORIDE 103 01/28/2021    CO2 26 01/28/2021    ANIONGAP 7 01/28/2021    GLC 93 01/28/2021    BUN 20 01/28/2021    CR 1.02 01/28/2021    GFRESTIMATED 49 (L) 01/28/2021    GFRESTBLACK 57 (L) 01/28/2021    SHREYA 10.1 01/28/2021        INR RESULTS:  Lab Results   Component Value Date    INR 0.95 07/20/2020    INR 0.99 06/26/2019    INR 0.97 02/07/2019    INR 0.91 12/19/2018       Procedures:  PULMONARY FUNCTION TESTS:   No flowsheet data found.      ECHOCARDIOGRAM:   See HPI for summary      Assessment and Plan:   1.  Atrial fibrillation-paroxysmal  2.  Past history of stress cardiomyopathy now resolved  3.  Recent knee surgery (right side) fully recovered    Plan  1.  Initiate low-dose rivaroxaban after discussion which is summarized above.  Patient knows to terminate should bladder bleeding resume or other bleeding becomes evident  2.  Follow-up video/telephone visit in approximately 6 weeks to assess whether she is tolerating anticoagulation  3.  Follow-up in 1 years time or earlier for her routine check    Total time of visit including documentation 30 minutes    I very much appreciated the opportunity to see and assess Dimple Oviedo in the clinic today . Please do not hesitate to contact my office if you have any questions or concerns.      Butch Griffith MD  Cardiac Arrhythmia Service  [facility]  [place] [id number] [phone]        REDDY CELIS

## 2021-04-28 ENCOUNTER — PRE VISIT (OUTPATIENT)
Dept: UROLOGY | Facility: CLINIC | Age: 86
End: 2021-04-28

## 2021-04-28 LAB — INTERPRETATION ECG - MUSE: NORMAL

## 2021-04-30 ENCOUNTER — OFFICE VISIT (OUTPATIENT)
Dept: UROLOGY | Facility: CLINIC | Age: 86
End: 2021-04-30
Payer: MEDICARE

## 2021-04-30 DIAGNOSIS — C67.8 MALIGNANT NEOPLASM OF OVERLAPPING SITES OF BLADDER (H): Primary | ICD-10-CM

## 2021-04-30 PROBLEM — L98.8: Status: ACTIVE | Noted: 2018-06-01

## 2021-04-30 PROCEDURE — 88120 CYTP URNE 3-5 PROBES EA SPEC: CPT | Performed by: PATHOLOGY

## 2021-04-30 PROCEDURE — 52000 CYSTOURETHROSCOPY: CPT | Performed by: UROLOGY

## 2021-04-30 PROCEDURE — 88112 CYTOPATH CELL ENHANCE TECH: CPT | Performed by: PATHOLOGY

## 2021-04-30 RX ORDER — LIDOCAINE HYDROCHLORIDE 20 MG/ML
JELLY TOPICAL ONCE
Status: COMPLETED | OUTPATIENT
Start: 2021-04-30 | End: 2021-04-30

## 2021-04-30 RX ORDER — CALCIUM CARBONATE 500(1250)
1 TABLET,CHEWABLE ORAL
COMMUNITY
End: 2021-09-02

## 2021-04-30 RX ORDER — CIPROFLOXACIN 500 MG/1
500 TABLET, FILM COATED ORAL ONCE
Status: COMPLETED | OUTPATIENT
Start: 2021-04-30 | End: 2021-04-30

## 2021-04-30 RX ADMIN — CIPROFLOXACIN 500 MG: 500 TABLET, FILM COATED ORAL at 11:38

## 2021-04-30 RX ADMIN — LIDOCAINE HYDROCHLORIDE: 20 JELLY TOPICAL at 10:20

## 2021-04-30 ASSESSMENT — PAIN SCALES - GENERAL: PAINLEVEL: NO PAIN (0)

## 2021-04-30 NOTE — NURSING NOTE
Invasive Procedure Safety Checklist:    Procedure: Cystoscopy    Action: Complete sections and checkboxes as appropriate.    Pre-procedure:  1. Patient ID Verified with 2 identifiers (Bettie and  or MRN) : YES    2. Procedure and site verified with patient/designee (when able) : YES    3. Accurate consent documentation in medical record : YES    4. H&P (or appropriate assessment) documented in medical record : NO  H&P must be up to 30 days prior to procedure an updated within 24 hours of                 Procedure as applicable.     5. Relevant diagnostic and radiology test results appropriately labeled and displayed as applicable : NO    6. Blood products, implants, devices, and/or special equipment available for the procedure as applicable : NO    7. Procedure site(s) marked with provider initials [Exclusions: ] : NO    8. Marking not required. Reason : Yes  Procedure does not require site marking    Time Out:     Time-Out performed immediately prior to starting procedure, including verbal and active participation of all team members addressing: YES    1. Correct patient identity.  2. Confirmed that the correct side and site are marked.  3. An accurate procedure to be done.  4. Agreement on the procedure to be done.  5. Correct patient position.  6. Relevant images and results are properly labeled and appropriately displayed.  7. The need to administer antibiotics or fluids for irrigation purposes during the procedure as applicable.  8. Safety precautions based on patient history or medication use.    During Procedure: Verification of correct person, site, and procedure occurs any time the responsibility for care of the patient is transferred to another member of the care team.    The following medication was given:     MEDICATION:  U[employer]jet  ROUTE: Urethral  SITE: Urethra  DOSE: 200 mg (20 mg/mL)  LOT #: [id number]  : [employer]  EXPIRATION DATE: 2022  NDC#: [address]-5   Was there drug waste? No    The  Subjective     Leticia Aquino is a 49 year old female who presents to clinic today for the following health issues:    HPI     Lump on Left Breast,  noticed it a few days ago- no pain.      Patient reports that she was showering and while lathering the soap noticed a lump on her left breast.  No redness, tenderness with palpation or nipple discharge.      Sister with a history of breast cancer (early 50's).        Patient Active Problem List   Diagnosis     Iron deficiency anemia secondary to inadequate dietary iron intake     Premenstrual tension syndrome     CARDIOVASCULAR SCREENING; LDL GOAL LESS THAN 160     Pain in joint, ankle and foot     Hypothyroidism due to acquired atrophy of thyroid     Past Surgical History:   Procedure Laterality Date     C NONSPECIFIC PROCEDURE      Right Inguinal Hernia Repair     C NONSPECIFIC PROCEDURE      D&C for Spontaneous      GYN SURGERY  11    ablation     HYSTERECTOMY SUPRACERVICAL  2012       Social History     Tobacco Use     Smoking status: Former Smoker     Smokeless tobacco: Never Used     Tobacco comment: QUIT    Substance Use Topics     Alcohol use: Yes     Comment: 2-3 per week      Family History   Problem Relation Age of Onset     Diabetes Father         Insulin Dependent     Cardiovascular Father         Triple by-pass in [phone]     Thyroid Disease Mother         Hypothyroid     Hypertension Mother      Hyperlipidemia Mother      Cerebrovascular Disease Mother 70        TIA - former smoker     Cancer Maternal Grandfather      Thyroid Disease Daughter         Dx at age 10     Breast Cancer Half-Sister 45     Colon Cancer No family hx of      Osteoporosis No family hx of          Current Outpatient Medications   Medication Sig Dispense Refill     acyclovir (ZOVIRAX) 200 MG capsule Take 1 capsule (200 mg) by mouth 5 times daily 25 capsule 1     atorvastatin (LIPITOR) 40 MG tablet Take 1 tablet (40 mg) by mouth daily 90 tablet 1  "    FLUoxetine (PROZAC) 20 MG capsule Take 2 capsules (40 mg) by mouth daily 180 capsule 2     IBUPROFEN 600 MG OR TABS take 1 every 8 hrs if needed  60 prn     levothyroxine (SYNTHROID/LEVOTHROID) 100 MCG tablet Take 1 tablet (100 mcg) by mouth daily 90 tablet 2     Allergies   Allergen Reactions     Augmentin GI Disturbance     Codeine      gi     Pseudoephedrine Hcl      restlessness         Reviewed and updated as needed this visit by Provider         Review of Systems   ROS COMP: Constitutional, HEENT, cardiovascular, pulmonary, gi and gu systems are negative, except as otherwise noted.      Objective    /86 (BP Location: Right arm, Patient Position: Sitting, Cuff Size: Adult Regular)   Pulse 80   Temp 98.7  F (37.1  C) (Oral)   Ht 1.575 m (5' 2\")   Wt 70.8 kg (156 lb)   LMP 09/13/2012   SpO2 98%   BMI 28.53 kg/m    Body mass index is 28.53 kg/m .  Physical Exam   GENERAL: healthy, alert and no distress  RESP: lungs clear to auscultation - no rales, rhonchi or wheezes  BREAST: right breast normal without masses, tenderness or nipple discharge and no palpable axillary masses or adenopathy  Left breast with approximately 2.5 x 3 cm mass (irregular border) which is non-tender to palpation  CV: regular rate and rhythm, normal S1 S2, no S3 or S4, no murmur  SKIN: no suspicious lesions or rashes  PSYCH: mentation appears normal, affect normal/bright            Assessment & Plan     Leticia was seen today for mass.    Diagnoses and all orders for this visit:    Lump or mass in breast  -     MA Diagnostic Digital Bilateral; Future  -     US Breast Left Limited 1-3 Quadrants; Future  -     Patient scheduled on 2/10/2020 for further imaging.          Return in about 1 week (around 2/13/2020) for further breast imaging.    Cait Roberts, CATHLEEN [facility]      " following medication was given:     MEDICATION:  Ciprofloxacin  ROUTE: PO  SITE: Medication was given orally   DOSE: 500 mg  LOT #: [id number]  : [employer]  EXPIRATION DATE: 09/2022  NDC#: [id number]   Was there drug waste? No      Virginia Velázquez CMA  April 30, 2021

## 2021-04-30 NOTE — NURSING NOTE
Chief Complaint   Patient presents with     RECHECK     Bladder cancer     Virginia Velázquez, CMA

## 2021-04-30 NOTE — PROGRESS NOTES
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 She completed chemotherapy (gem/carbo).  Stable to no evidence of disease in the upper tracts.     2. 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 Got 6 of BCG and tolerated it well. Negative biopsies 2020, 20.       Plan for 3 weekly maintenance BCG treatments at 3, 6, 12, 18, 24, 30, and 36 months.  Now at 9 months from 2020 2nd round of BCG.     Plan  -12 month maintenance bcg now  -return to clinic 3 months for cystoscopy  -cipro today    ______________________________________________________________________     HPI  Dimple Oviedo is a 86 year old female with a history of high grade upper tract urothelial cancer (pT4N1) here for follow up after right nephroureterectomy on 18. The patient is following yolanda Toledo of Hematology and Oncology.      Per Dr. Toledo's note, on 18 Mr. Oviedo- Started adjuvant chemotherapy (carbo+gem) per POUT trial. Cycle 2 - 19. Cycle 3 - 19. Cycle 4 - 19.     Date of last abdominal and pelvis imagin2019   Date of last chest imagin2019   Any recurrence? cystoscopy on 10/03/2019 showed small area of erythema. Bladder biopsy from 2020 showed urothelial carcinoma in-situ , negative biopsy 2020  Intravesical Therapy: BCG X6 2020 (Full Strength)     She had right total knee arthoplasty 2020    Today she is doing well.     Review of Systems:   Pertinent items are noted in HPI or as below, remainder of complete ROS is negative.       Telephone Visit     Laboratory:   I reviewed all applicable laboratory and pathology data and went over findings with patient  Significant for            Lab Results   Component Value Date     CR 1.19 /10/2020      CR 1.09 12/04/2019     CR 0.96 10/04/2019     CR 1.03 09/11/2019     CR 0.99 06/12/2019     CR 1.02 03/06/2019     CR 1.00 02/13/2019     CR 1.18 02/11/2019     CR 0.98 02/07/2019     CR 0.99 02/03/2019      Imaging:   I personally viewed all applicable images, interpreted them, and discussed findings with the patient.      CT 4/2020  IMPRESSION:   1. No evidence of recurrent/metastatic disease in the chest, abdomen,  or pelvis.   2. Stable surgical changes of right nephrectomy/ureterectomy.  3. Unchanged left renal artery aneurysm.  4. Unchanged nonobstructing calyceal stone in the left kidney.  5. Moderate hiatal hernia.    CYSTOSCOPY PROCEDURE:  Sterile preparation and drape and an informed consent were performed.  A 16-Polish flexible cystoscope was introduced via the urethra.  The urethra was open.  The bladder mucosa showed no evidence of any lesions or trabeculation.  There were no stones.

## 2021-04-30 NOTE — LETTER
2021    RE: Dimple Oviedo  [address]  [facility] [address]     Dear Colleague,    Thank you for referring your patient, Dimple Oviedo, to the [facility] [place] [facility]. Please see a copy of my visit note below.    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 She completed chemotherapy (gem/carbo).  Stable to no evidence of disease in the upper tracts.     2. 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 Got 6 of BCG and tolerated it well. Negative biopsies 2020, 20.       Plan for 3 weekly maintenance BCG treatments at 3, 6, 12, 18, 24, 30, and 36 months.  Now at 9 months from 2020 2nd round of BCG.     Plan  -12 month maintenance bcg now  -return to clinic 3 months for cystoscopy  -cipro today    ______________________________________________________________________     HPI  Dimple Oviedo is a 86 year old female with a history of high grade upper tract urothelial cancer (pT4N1) here for follow up after right nephroureterectomy on 18. The patient is following yolanda Toledo of H[facility]ology and On[facility].      Per Dr. Toledo's note, on 18 Mr. Oviedo- Started adjuvant chemotherapy (carbo+gem) per POUT trial. Cycle 2 - 19. Cycle 3 - 19. Cycle 4 - 19.     Date of last abdominal and pelvis imagin2019   Date of last chest imagin2019   Any recurrence? cystoscopy on 10/03/2019 showed small area of erythema. Bladder biopsy from 2020 showed urothelial carcinoma in-situ , negative biopsy 2020  Intravesical Therapy: BCG X6 2020 (Full Strength)     She had right total knee arthoplasty 2020    Today she is doing  well.     Review of Systems:   Pertinent items are noted in HPI or as below, remainder of complete ROS is negative.       Telephone Visit     Laboratory:   I reviewed all applicable laboratory and pathology data and went over findings with patient  Significant for            Lab Results   Component Value Date     CR 1.19 01/10/2020     CR 1.09 12/04/2019     CR 0.96 10/04/2019     CR 1.03 09/11/2019     CR 0.99 06/12/2019     CR 1.02 03/06/2019     CR 1.00 02/13/2019     CR 1.18 02/11/2019     CR 0.98 02/07/2019     CR 0.99 02/03/2019      Imaging:   I personally viewed all applicable images, interpreted them, and discussed findings with the patient.      CT 4/2020  IMPRESSION:   1. No evidence of recurrent/metastatic disease in the chest, abdomen,  or pelvis.   2. Stable surgical changes of right nephrectomy/ureterectomy.  3. Unchanged left renal artery aneurysm.  4. Unchanged nonobstructing calyceal stone in the left kidney.  5. Moderate hiatal hernia.    CYSTOSCOPY PROCEDURE:  Sterile preparation and drape and an informed consent were performed.  A 16-[place] flexible cystoscope was introduced via the urethra.  The urethra was open.  The bladder mucosa showed no evidence of any lesions or trabeculation.  There were no stones.

## 2021-04-30 NOTE — PATIENT INSTRUCTIONS
Will start BCG x 3 starting in the next couple of weeks.  Kiera Figueroa, RN care coordinator for Dr. Henry will be in touch with you to set this up.    Urine sent for testing today.  Dr. Henry will notify you of the results.    Follow up with Dr. Henry in 3 months for repeat cystoscopy.    It was a pleasure meeting with you today.  Thank you for allowing me and my team the privilege of caring for you today.  YOU are the reason we are here, and I truly hope we provided you with the excellent service you deserve.  Please let us know if there is anything else we can do for you so that we can be sure you are leaving completely satisfied with your care experience.        Virginia Velázquez, CMA

## 2021-05-11 ENCOUNTER — VIRTUAL VISIT (OUTPATIENT)
Dept: ONCOLOGY | Facility: CLINIC | Age: 86
End: 2021-05-11
Attending: INTERNAL MEDICINE
Payer: MEDICARE

## 2021-05-11 ENCOUNTER — ANCILLARY PROCEDURE (OUTPATIENT)
Dept: CT IMAGING | Facility: CLINIC | Age: 86
End: 2021-05-11
Attending: INTERNAL MEDICINE
Payer: MEDICARE

## 2021-05-11 DIAGNOSIS — C66.1 UROTHELIAL CARCINOMA OF RIGHT DISTAL URETER (H): ICD-10-CM

## 2021-05-11 DIAGNOSIS — D63.0 ANEMIA IN NEOPLASTIC DISEASE: ICD-10-CM

## 2021-05-11 DIAGNOSIS — C66.1 UROTHELIAL CARCINOMA OF RIGHT DISTAL URETER (H): Primary | ICD-10-CM

## 2021-05-11 LAB
ALBUMIN SERPL-MCNC: 3.6 G/DL (ref 3.4–5)
ALP SERPL-CCNC: 94 U/L (ref 40–150)
ALT SERPL W P-5'-P-CCNC: 18 U/L (ref 0–50)
ANION GAP SERPL CALCULATED.3IONS-SCNC: 8 MMOL/L (ref 3–14)
AST SERPL W P-5'-P-CCNC: 10 U/L (ref 0–45)
BASOPHILS # BLD AUTO: 0.1 10E9/L (ref 0–0.2)
BASOPHILS NFR BLD AUTO: 1.1 %
BILIRUB SERPL-MCNC: 0.5 MG/DL (ref 0.2–1.3)
BUN SERPL-MCNC: 24 MG/DL (ref 7–30)
CALCIUM SERPL-MCNC: 9.3 MG/DL (ref 8.5–10.1)
CHLORIDE SERPL-SCNC: 101 MMOL/L (ref 94–109)
CO2 SERPL-SCNC: 29 MMOL/L (ref 20–32)
CREAT SERPL-MCNC: 1.04 MG/DL (ref 0.52–1.04)
DIFFERENTIAL METHOD BLD: NORMAL
EOSINOPHIL # BLD AUTO: 0.2 10E9/L (ref 0–0.7)
EOSINOPHIL NFR BLD AUTO: 2.7 %
ERYTHROCYTE [DISTWIDTH] IN BLOOD BY AUTOMATED COUNT: 13.6 % (ref 10–15)
GFR SERPL CREATININE-BSD FRML MDRD: 48 ML/MIN/{1.73_M2}
GLUCOSE SERPL-MCNC: 161 MG/DL (ref 70–99)
HCT VFR BLD AUTO: 39.2 % (ref 35–47)
HGB BLD-MCNC: 12.6 G/DL (ref 11.7–15.7)
IMM GRANULOCYTES # BLD: 0.1 10E9/L (ref 0–0.4)
IMM GRANULOCYTES NFR BLD: 0.7 %
LYMPHOCYTES # BLD AUTO: 1.2 10E9/L (ref 0.8–5.3)
LYMPHOCYTES NFR BLD AUTO: 16.6 %
MCH RBC QN AUTO: 31.5 PG (ref 26.5–33)
MCHC RBC AUTO-ENTMCNC: 32.1 G/DL (ref 31.5–36.5)
MCV RBC AUTO: 98 FL (ref 78–100)
MONOCYTES # BLD AUTO: 0.5 10E9/L (ref 0–1.3)
MONOCYTES NFR BLD AUTO: 6.3 %
NEUTROPHILS # BLD AUTO: 5.2 10E9/L (ref 1.6–8.3)
NEUTROPHILS NFR BLD AUTO: 72.6 %
NRBC # BLD AUTO: 0 10*3/UL
NRBC BLD AUTO-RTO: 0 /100
PLATELET # BLD AUTO: 220 10E9/L (ref 150–450)
POTASSIUM SERPL-SCNC: 4.2 MMOL/L (ref 3.4–5.3)
PROT SERPL-MCNC: 7.4 G/DL (ref 6.8–8.8)
RBC # BLD AUTO: 4 10E12/L (ref 3.8–5.2)
SODIUM SERPL-SCNC: 138 MMOL/L (ref 133–144)
WBC # BLD AUTO: 7.1 10E9/L (ref 4–11)

## 2021-05-11 PROCEDURE — 71250 CT THORAX DX C-: CPT | Mod: MG | Performed by: RADIOLOGY

## 2021-05-11 PROCEDURE — 85025 COMPLETE CBC W/AUTO DIFF WBC: CPT | Performed by: PATHOLOGY

## 2021-05-11 PROCEDURE — 36415 COLL VENOUS BLD VENIPUNCTURE: CPT | Performed by: PATHOLOGY

## 2021-05-11 PROCEDURE — 999N001193 HC VIDEO/TELEPHONE VISIT; NO CHARGE

## 2021-05-11 PROCEDURE — G1004 CDSM NDSC: HCPCS | Performed by: RADIOLOGY

## 2021-05-11 PROCEDURE — 74176 CT ABD & PELVIS W/O CONTRAST: CPT | Mod: MG | Performed by: RADIOLOGY

## 2021-05-11 PROCEDURE — 80053 COMPREHEN METABOLIC PANEL: CPT | Performed by: PATHOLOGY

## 2021-05-11 PROCEDURE — 99443 PR PHYSICIAN TELEPHONE EVALUATION 21-30 MIN: CPT | Mod: 95 | Performed by: INTERNAL MEDICINE

## 2021-05-11 NOTE — PROGRESS NOTES
"Dimple is a 87 year old who is being evaluated via a billable telephone visit.      What phone number would you like to be contacted at? [phone]  How would you like to obtain your AVS? Mail a copy     Vitals - Patient Reported  Weight (Patient Reported): 74.8 kg (165 lb)  Height (Patient Reported): 145.4 cm (4' 9.25\")  BMI (Based on Pt Reported Ht/Wt): 35.39  Pain Score: No Pain (0)    Adina SILVERIO     "

## 2021-05-11 NOTE — LETTER
"    5/11/2021         RE: Dimple Oviedo  [address]  [facility] [address]        Dear Colleague,    Thank you for referring your patient, Dimple Oviedo, to the [facility]. Please see a copy of my visit note below.    Dimple is a 87 year old who is being evaluated via a billable telephone visit.      What phone number would you like to be contacted at? [phone]  How would you like to obtain your AVS? Mail a copy     Vitals - Patient Reported  Weight (Patient Reported): 74.8 kg (165 lb)  Height (Patient Reported): 145.4 cm (4' 9.25\")  BMI (Based on Pt Reported Ht/Wt): 35.39  Pain Score: No Pain (0)    Adina Parr [place]       MEDICAL ONCOLOGY VIRTUAL VISIT NOTE    REFERRING PROVIDER: Stuart King MD    REASON FOR CURRENT VISIT: Evaluation while on surveillance after adjuvant chemotherapy for high-grade transitional cell carcinoma of right renal pelvis.    HISTORY OF PRESENT ILLNESS:  Ms. 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 Her oncologic history is as under.    Ms. Oviedo is doing well. She denies any complains. On direct questioning she admits having shortness of breath but attributes this to having gained some weight during the COVID times. She has not been going out much. She has stayed at home and worked on stuff.     She had a cystoscopy done 2 weeks ago and has BCG therapy planned later this week, which she kept referring as chemotherapy.      ONCOLOGIC HISTORY:  1. 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   - Jan 2018 to September 2018- Persistent gross hematuria and underwent multiple procedures.    - 2/19/18 and 4/3/18- cystoscopies with bladder biopsies  which were negative for bladder tumor  - 1/17/18, 3/8/18, 7/26/18, 9/10/18- Multiple urine " "cytologies have come back positive by FISH for high-grade transitional cell carcinoma  - 9/10/18- Underwent a bilateral cystoureteroscopy with biopsy and brushing that showed  right upper tract cytology suspicious for high-grade urothelial ca. Right ureter brushing negative from that day. Left upper tract negative.  - 9/10/18- CT A/P without contrast showed new small bilateral pleural effusions and interstitial pulmonary edema but no evidence of locoregional or metastatic disease.  - 9/12/18- Presented to ED with oliguria, CRESENCIO, and mild hydronephrosis bilaterally on CT scan and underwent bilateral ureteral stent placment  - 11/13/2018- Right robotic nephroureterectomy, excision of ana-caval mass and LN excision by Stuart King. Pathology showed \"Right nephroureterectomy: Invasive high grade urothelial carcinoma measuring 3.5 cm, located in renal pelvis and invading through renal parenchyma into perinephric fat. Urothelial carcinoma in-situ present. Margins free of tumor. Ana-caval mass: Metastatic urothelial carcinoma involving one lymph node with at least 1 cm tumor deposit. Pericaval Extranodal Extension present. Five additional benign pericaval lymph nodes. Pathologic stage: pT4N1 (1 of 6 lymph nodes).\"  - 12/11/18- PET/CT whole body demonstrated significant residual FDG avid disease. For example, \"there is an FDG avid ill-defined pericaval mass immediately medial to the surgical clips measuring approximately 2.0 x 1.4 cm, with max SUV measuring up to12.2, see series 4 image 21. Additional FDG avid retrocaval and interaortocaval lymphadenopathy are noted.There is a 1.2 cm nodule in the right hemipelvis posterior lateral tothe bladder with max SUV measuring 8.3, see series 4 image 77. The bladder is incompletely distended.\" No suspicious thoracic or bone uptake.  - 12/12/18- Started adjuvant chemotherapy (carbo+gem) per POUT trial. Cycle 2 - 1/2/19. Cycle 3 - 1/23/19. Cycle 4 - 02/13/19.  - 1/22/19 - CT " "C/A/P with contrast compared with prior PET/CT: \"1. New well-circumscribed soft tissue pulmonary nodules of the right lower lobe and left upper lobe. Findings could be infectious, inflammatory, or represent metastatic disease. Continued attention on follow-up recommended. Postoperative changes of right nephrectomy. No evidence for local recurrence in the present study.\"  - 3/1/2019- CT C/A/P with contrast - \"1. Bilateral pulmonary nodules measuring up to 4 mm in the right lower lobe, previously measuring 8 mm. No new or enlarging pulmonary nodules. 2. No convincing evidence for metastatic disease in the abdomen, pelvis and bones.\"  - 6/3/2019- CT C/A/P with contrast - \"1. Surgical changes of right nephrectomy without evidence of residual or recurrent disease on this noncontrast exam.  Consider contrast enhanced examination on follow-up. 2. No evidence of metastatic disease in the abdomen, or pelvis on noncontrast CT.  Scattered tiny pulmonary nodules in the chest are not significantly changed.  Previously described prominent right lower lobe pulmonary nodule (previously measuring up to 8 mm) is no longer visualized. 3. Stable bilateral pulmonary nodules measuring maximally 4 mm.\"  - 09/11/19: CT C/A/P without contrast - \"1. Stable exam without evidence convincing for new disease. 2. Stable pulmonary nodules. 3. 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 4. Stable heterogeneous enlargement of the thyroid with underlying nodules suggested.\"  - 12/4/19: CT C/A/P without contrast - \"No acute change since prior exam. No evidence of recurrent or metastatic disease. Tiny lung nodules are stable. Prior right nephrectomy. Left renal artery aneurysm is stable.\"  - 04/08/20, 7/27/20: CT C/A/P with contrast - GABRIELLE.  - 01/12/21: CT C/A/P with contrast - \"1.  Slight increased size of a 6 mm left upper lobe nodule and new 4 mm linear opacity along the right major fissure. These are indeterminate but could represent " "progression of metastatic disease. 2.  Slight increased size of mildly enlarged mediastinal and hilar lymph nodes. 3.  Mild pulmonary edema. 4.  No recurrent or metastatic disease in the abdomen and pelvis. 5.  Mild stranding around the urinary bladder dome may be related to cystitis. Punctate focus of gas within the urinary bladder either secondary to infection or instrumentation. 6.  Enlarged multinodular thyroid gland.\"    2. Non-muscle invasive bladder cancer:  - 10/3/19: Cystoscopy showed a small area of erythema on retroflexion, possibly pre-existing or due to retroflexion of the scope. Urine cytology from that time showed cells suspicious for malignancy.   - 1/13/20: Bladder biopsy showed high grade urothelial carcinoma in-situ  - 2/12/20-3/18/20: Intravesical BCG therapy  - 7/24/20 and last cystoscopy was on the same day. It was negative. However, urine cytology was positive for high-grade urothelial carcinoma.   - 11/25/20-12/10/2020: 3 weekly doses of intravesical BCG 50mg.   - 12/10/20: Cytology suspicious and FISH urovysion positive for TCC.    REVIEW OF SYSTEMS: 14 point ROS negative other than the symptoms noted above in the HPI.    PAST MEDICAL AND SURGICAL HISTORY:   Past Medical History:   Diagnosis Date     Calculus of kidney      Chemotherapy-induced neutropenia (H) 3/6/2019     Esophageal reflux      GERD (gastroesophageal reflux disease)      Hyperlipidemia LDL goal <130 5/9/2010     Malignant melanoma of skin of trunk, except scrotum (H)      Nonspecific abnormal finding     has living will 2004 -      Nontoxic multinodular goiter     no further eval /tx rec per pt     Osteopenia      Other psoriasis      Personal history of colonic polyps      PMR (polymyalgia rheumatica) (H)      Stress-induced cardiomyopathy      Undiagnosed cardiac murmurs      Unspecified constipation      Unspecified essential hypertension      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     Past Surgical History:   Procedure " Laterality Date     ARTHROPLASTY KNEE Right 11/9/2020    Procedure: ARTHROPLASTY, KNEE, TOTAL, RIGHT;  Surgeon: Edward Otero MD;  [facility]     BIOPSY       COLONOSCOPY  2014     COMBINED CYSTOSCOPY, INSERT STENT URETER(S) Bilateral 9/12/2018    Procedure: COMBINED CYSTOSCOPY, INSERT STENT URETER(S);  Cystoscopy Bilateral ureteral Stent Placement.;  Surgeon: Justin Henry MD;  [facility]     COMBINED CYSTOSCOPY, RETROGRADES, URETEROSCOPY, INSERT STENT Bilateral 4/3/2018    Procedure: COMBINED CYSTOSCOPY, RETROGRADES, URETEROSCOPY, INSERT STENT;;  Surgeon: Stuart King MD;  [facility]     COMBINED CYSTOSCOPY, RETROGRADES, URETEROSCOPY, INSERT STENT Bilateral 9/10/2018    Procedure: COMBINED CYSTOSCOPY, RETROGRADES, URETEROSCOPY, INSERT STENT;  Cystoscopy, Bilateral Ureteroscopy, Bladder Biopsies, Retrogram Pyelograms, Ureteral Washings and brushings, cysview;  Surgeon: Stuart King MD;  [facility]     COMBINED CYSTOSCOPY, RETROGRADES, URETEROSCOPY, INSERT STENT Left 8/26/2020    Procedure: Cystoscopy, Left Ureteral Wash, Left ureteral Retrograde Pyelogram;  Surgeon: Justin Henry MD;  [facility]     CYSTOSCOPY, BIOPSY BLADDER INSTILL OPTICAL AGENT N/A 4/3/2018    Procedure: CYSTOSCOPY, BIOPSY BLADDER INSTILL OPTICAL AGENT;  Cystoscopy, Blue Light Cystoscopy, Bladder Biopsies, Bilateral Selective ureteral washings for Cytology, Bilateral Retrograde Pyelograms, Bilateral Ureteroscopy;  Surgeon: Stuart King MD;  [facility]     CYSTOSCOPY, BIOPSY BLADDER, COMBINED N/A 2/19/2018    Procedure: COMBINED CYSTOSCOPY, BIOPSY BLADDER;  Cystoscopy, Bladder Biopsy;  Surgeon: Kenna La MD;  [facility]     CYSTOSCOPY, BIOPSY BLADDER, COMBINED N/A 8/26/2020    Procedure: Cystoscopy, biopsy bladder, combined;  Surgeon: Justin Henry MD;  [facility]     CYSTOSCOPY, FULGURATE BLADDER TUMOR, COMBINED N/A  1/13/2020    Procedure: CYSTOSCOPY, WITH  bladder biopsies and fulguration;  Surgeon: Stuart King MD;  [facility]     CYSTOSCOPY, REMOVE STENT(S), COMBINED  11/13/2018    Procedure: Flexible Cystoscopy with Stent Removal;  Surgeon: Stuart King MD;  [facility]     DAVINCI LYMPHADENECTOMY N/A 11/13/2018    Procedure: Davinci Lymphadenectomy ;  Surgeon: Stuart King MD;  [facility]     DAVINCI NEPHROURETERECTOMY N/A 11/13/2018    Procedure: Right DaVinci Assisted Nephroureterectomy;  Surgeon: Stuart King MD;  [facility]     ENDOSCOPIC ULTRASOUND LOWER GASTROINTESTIONAL TRACT (GI) N/A 10/30/2015    Procedure: ENDOSCOPIC ULTRASOUND LOWER GASTROINTESTIONAL TRACT (GI);  Surgeon: Daniel Jean-Baptiste MD;  [facility]     EYE SURGERY  12/4/17     INSERT PORT VASCULAR ACCESS Right 12/19/2018    Procedure: Chest Port Placement - right;  Surgeon: Stuart Chavez PA-C;  [facility]     IR CHEST PORT PLACEMENT > 5 YRS OF AGE  12/19/2018     IR PORT REMOVAL RIGHT  6/26/2019     LAPAROSCOPIC CHOLECYSTECTOMY WITH CHOLANGIOGRAMS N/A 11/1/2015    Procedure: LAPAROSCOPIC CHOLECYSTECTOMY WITH CHOLANGIOGRAMS;  Surgeon: Tonie Warren MD;  [facility]     REMOVE PORT VASCULAR ACCESS Right 6/26/2019    Procedure: Right Port Removal;  Surgeon: Froilan Irizarry PA-C;  [facility]     SURGICAL HISTORY OF -   1996    malignant melanoma     SURGICAL HISTORY OF -   1968    thyroid nodule     SURGICAL HISTORY OF -       D & C     ZZC NONSPECIFIC PROCEDURE  2005    colonoscopy polyp repeat 2010     SOCIAL HISTORY:   Social History     Tobacco Use     Smoking status: Never Smoker     Smokeless tobacco: Never Used   Substance Use Topics     Alcohol use: No     Alcohol/week: 0.0 standard drinks     Comment: rare     Drug use: No     FAMILY HISTORY:   Family History   Problem Relation Age of Onset     Cancer Father         dec -  esophageal and laryngeal     Heart Disease Mother      Respiratory Mother         dec     Breast Cancer Daughter      Other Cancer Daughter      Thyroid Disease Daughter      Asthma Daughter      Hyperlipidemia Son      Diabetes Son      Anesthesia Reaction No family hx of      Deep Vein Thrombosis (DVT) No family hx of      ALLERGIES:   Allergies   Allergen Reactions     Codeine      CURRENT MEDICATIONS:   Current Outpatient Medications:      acetaminophen (TYLENOL) 325 MG tablet, Take 3 tablets (975 mg) by mouth every 8 hours as needed for pain, Disp: 1 Bottle, Rfl: 0     alendronate (FOSAMAX) 70 MG tablet, TAKE 1 TABLET EVERY 7 DAYS AT LEAST 60 MINUTES BEFORE BREAKFAST AS DIRECTED., Disp: 12 tablet, Rfl: 3     calcium carbonate 1250 (500 Ca) MG CHEW, Take 1 tablet by mouth, Disp: , Rfl:      calcium carbonate-vitamin D (OS-SHREYA) 500-400 MG-UNIT tablet, Take 1 tablet by mouth daily, Disp: , Rfl:      cycloSPORINE (RESTASIS) 0.05 % ophthalmic emulsion, Place 1 drop into both eyes 2 times daily, Disp: , Rfl:      ferrous sulfate 325 (65 Fe) MG TBEC EC tablet, Take 325 mg by mouth every morning , Disp: , Rfl:      furosemide (LASIX) 20 MG tablet, TAKE 1 TABLET (20 MG) BY MOUTH EVERY MORNING, Disp: 90 tablet, Rfl: 3     hydrOXYzine (ATARAX) 10 MG tablet, Take 1 tablet (10 mg) by mouth every 6 hours as needed for itching or other (adjuvant pain), Disp: 10 tablet, Rfl: 0     irbesartan (AVAPRO) 300 MG tablet, TAKE 1 TABLET EVERY DAY, Disp: 90 tablet, Rfl: 2     levofloxacin (LEVAQUIN) 500 MG tablet, Take one tablet 6 hours after treatment and one tablet the following morning after treatment., Disp: 6 tablet, Rfl: 0     lovastatin (MEVACOR) 40 MG tablet, Take 1 tablet (40 mg) by mouth At Bedtime, Disp: 90 tablet, Rfl: 3     melatonin 5 MG tablet, Take 5 mg by mouth At Bedtime, Disp: , Rfl:      methimazole (TAPAZOLE) 5 MG tablet, Take 1 tablet (5 mg) by mouth daily, Disp: 90 tablet, Rfl: 3     Omega-3 Fatty Acids (FISH  OIL) 500 MG CAPS, Take 1 capsule by mouth every morning , Disp: , Rfl:      omeprazole (PRILOSEC) 20 MG DR capsule, TAKE 1 CAPSULE EVERY DAY, Disp: 90 capsule, Rfl: 1     ondansetron (ZOFRAN-ODT) 4 MG ODT tab, Take 1 tablet (4 mg) by mouth every 6 hours as needed for nausea or vomiting, Disp: 20 tablet, Rfl: 0     oxyCODONE (ROXICODONE) 5 MG tablet, Take 1 tablet (5 mg) by mouth every 4 hours as needed for moderate to severe pain, Disp: 20 tablet, Rfl: 0     polyethylene glycol (MIRALAX) 17 g packet, Take 17 g by mouth daily, Disp: 10 packet, Rfl: 0     Probiotic Product (PROBIOTIC ADVANCED PO), Take 1 capsule by mouth every morning , Disp: , Rfl:      propranolol ER (INDERAL LA) 60 MG 24 hr capsule, Take 1 capsule (60 mg) by mouth daily, Disp: 90 capsule, Rfl: 2     rivaroxaban ANTICOAGULANT (XARELTO) 15 MG TABS tablet, Take 1 tablet (15 mg) by mouth daily (with dinner), Disp: 90 tablet, Rfl: 0     rOPINIRole (REQUIP) 0.25 MG tablet, TAKE 1 TABLET IN THE AFTERNOON AND TAKE 2 TABLETS EVERY NIGHT, Disp: 270 tablet, Rfl: 3     senna-docusate (SENOKOT-S/PERICOLACE) 8.6-50 MG tablet, Take 1-2 tablets by mouth 2 times daily as needed for constipation, Disp: 30 tablet, Rfl: 0     sertraline (ZOLOFT) 100 MG tablet, TAKE 1 TABLET EVERY MORNING, Disp: 90 tablet, Rfl: 0     traZODone (DESYREL) 50 MG tablet, TAKE 1/2 TABLET AT BEDTIME, Disp: 45 tablet, Rfl: 2    PHYSICAL EXAMINATION:  Deferred. Phone visit due to COVID.    LABORATORY DATA:   Recent Labs   Lab Test 05/11/21  1007 01/28/21  1340 01/12/21  1230 11/11/20  0617 11/10/20  1536    136 133 134 130*   POTASSIUM 4.2 4.4 5.6* 4.2 3.8   CHLORIDE 101 103 100 105 100   CO2 29 26 31 25 23   ANIONGAP 8 7 2* 4 7   BUN 24 20 22 16 20   CR 1.04 1.02 0.97 1.06* 1.08*   * 93 110* 89 140*   SHREYA 9.3 10.1 8.6 7.7* 7.3*     Recent Labs   Lab Test 02/03/19  2102 12/12/18  1050 01/16/18  1247 01/16/18  0646 12/13/17  2236 04/18/16  0905 11/02/15  0912 11/01/15  0727  10/31/15  0810   MAG 1.8 2.1 2.1 2.0 2.0  --   --  2.0  --    PHOS  --   --   --   --  2.4* 3.2 3.2 2.1* 2.4*     Recent Labs   Lab Test 05/11/21  1007 01/12/21  1230 11/11/20  0617 11/10/20  0654 10/28/20  1244 09/03/20  1213 08/10/20  1017 07/20/20  1828 04/08/20  1027   WBC 7.1 6.2  --   --  6.4 11.2* 6.2 7.1 6.3   HGB 12.6 11.7 9.9* 10.3* 13.0 13.0 13.1 12.9 12.6    171  --   --  218 224 231 224 202   MCV 98 100  --   --  98 99 100 99 98   NEUTROPHIL 72.6 67.6  --   --   --  77.6  --  64.3 69.2     Recent Labs   Lab Test 05/11/21  1007 01/28/21  1340 01/12/21  1230 07/17/18  1346 07/17/18  1346   BILITOTAL 0.5 0.4 0.4   < >  --    ALKPHOS 94 86 91   < >  --    ALT 18 21 18   < >  --    AST 10 16 16   < >  --    ALBUMIN 3.6 3.9 3.2*   < >  --    LDH  --   --   --   --  204    < > = values in this interval not displayed.     TSH   Date Value Ref Range Status   01/27/2021 2.42 0.40 - 4.00 mU/L Final   07/27/2020 3.39 0.40 - 4.00 mU/L Final   07/20/2020 3.08 0.40 - 4.00 mU/L Final     No results for input(s): CEA in the last 22103 hours.  Results for orders placed or performed in visit on 05/11/21   CT Chest Abdomen Pelvis w/o Contrast    Narrative    CT CHEST, ABDOMEN AND PELVIS WITHOUT CONTRAST  5/11/2021 10:27 AM    CLINICAL HISTORY: 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).  Restaging.    TECHNIQUE: CT scan of the chest, abdomen, and pelvis was performed  without IV contrast. Multiplanar reformats were obtained. Dose  reduction techniques were used.   CONTRAST: None.  COMPARISON: CT of the chest performed 3/8/2021. CT of the chest,  abdomen, and pelvis performed 1/12/2021.    FINDINGS:   LUNGS AND PLEURA: Few small calcified granulomas in the right lung. An  indeterminate 0.5 cm pulmonary nodule in the left upper lobe (series 6  image 92) is unchanged. Scattered smaller indeterminate pulmonary  nodules in both lungs are also unchanged. No new or enlarging  pulmonary nodules are identified. No  pleural effusion.    MEDIASTINUM/AXILLAE: No enlarged lymph nodes are identified in the  chest. No pericardial effusion. Atherosclerotic calcification of the  thoracic aorta. Diffuse enlargement of the thyroid gland, right  greater than left, is partially included on this exam, and has a  similar appearance to the previous exam. Small to moderate hiatal  hernia.    CORONARY ARTERY CALCIFICATION: None.    HEPATOBILIARY: Prior cholecystectomy. No hepatic masses are seen.    PANCREAS: Normal.    SPLEEN: Normal.    ADRENAL GLANDS: Normal.    KIDNEYS/BLADDER: Postoperative changes of right nephroureterostomy are  again noted. Nonobstructing stone in the interpolar region of the left  kidney measures 0.5 cm. The left kidney is otherwise unremarkable. No  hydronephrosis. The urinary bladder is unremarkable.    BOWEL: No bowel obstruction. No convincing evidence for colitis or  diverticulitis. Unremarkable appendix.    PELVIC ORGANS: Unremarkable.    LYMPH NODES: No enlarged lymph nodes are identified in the abdomen or  pelvis.    VASCULATURE: Mild atherosclerotic aortoiliac calcification. 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    ADDITIONAL FINDINGS: None.    MUSCULOSKELETAL: Degenerative changes are noted throughout the  thoracolumbar spine.      Impression    IMPRESSION:   1.  Scattered indeterminate pulmonary nodules in both lungs are  unchanged, with the largest in the left upper lobe measuring 0.5 cm.  2.  Postoperative changes of prior right nephroureterectomy.  3.  Nonobstructing 0.5 cm stone in the interpolar region of the right kidney.  4.  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  5.  Diffuse thyroid gland enlargement, greater on the right, is again  noted. Consider thyroid ultrasound for further evaluation.    MARAL KHOURY MD     *Note: Due to a large number of results and/or encounters for the requested time period, some results have  not been displayed. A complete set of results can be found in Results Review.         ASSESSMENT AND PLAN: Ms. Oviedo is a delightful 87-year-old lady with localized stage IV ([id number]) high-grade, muscle-invasive transitional cell carcinoma of right renal pelvis, status post right robotic nephroureterectomy and 4 cycles of adjuvant carbo/gem; as well as NMIBC.    1. Upper tract urothelial cancer:   - She completed 4 cycles of adjuvant carboplatin plus gemcitabine chemotherapy per POUT trial.    She completed chemotherapy in Feb 2019 over 2 yrs ago  - No residual side effects or evidence of recurrence.  - Reviewed restaging CT scan from Jan and March and now May 2021; there is no clear evidence of disease recurrence in abd/pelvis.   She continues to have pulmonary nodules which remain stable.    Lung findings are non-specific and these are also not responsible for her shortness of breath    - We will continue to monitor and her get a repeat CT C/A/P without contrast in 3-4 months.     2. High grade urothelial carcinoma in situ:  - Bx proven carcinoma in situ in 1/2020, completed the first round of intravesical BCG treatment 2/2020-3/2020 and second in 11/2020-12/2020.  - She was initially followed by Dr. King and now is under care of Dr. Froilan Henry.   - She had a cystoscopy done 2 weeks ago which has been unremarkable. She will continue on 3 weekly BCG every 3 months as part of her maintenance  - She could be a candidate for mitogel v nivo+TRINI clinical trial (PI: Joseph Dunn MD) if she has BCG failure    3. Chemo induced anemia and thrombocytopenia:  - Resolved.     4. Persistent HERMAN:  - She follows Dr. Griffith in Cardiology for her h/o moderate AORTIC STENOSIS, CHF, and Afib now with worsening SOA and lymphedema with no cancer-related etiology evident.     Return to clinic in 3-4 months with restaging scans.    All of the above was explained to the patient in lay language and several questions were answered.  They are in agreement with the plan.      Telephone visit duration - 23 mins    Sd Fine    Hematologist and Medical Oncologist  [facility]             Again, thank you for allowing me to participate in the care of your patient.        Sincerely,        Sd Fine MD

## 2021-05-11 NOTE — PROGRESS NOTES
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    HISTORY OF PRESENT ILLNESS:  Ms. 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 Her oncologic history is as under.    Ms. Oviedo is doing well. She denies any complains. On direct questioning she admits having shortness of breath but attributes this to having gained some weight during the COVID times. She has not been going out much. She has stayed at home and worked on stuff.     She had a cystoscopy done 2 weeks ago and has BCG therapy planned later this week, which she kept referring as chemotherapy.      ONCOLOGIC HISTORY:  1. 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   - Jan 2018 to September 2018- Persistent gross hematuria and underwent multiple procedures.    - 2/19/18 and 4/3/18- cystoscopies with bladder biopsies  which were negative for bladder tumor  - 1/17/18, 3/8/18, 7/26/18, 9/10/18- Multiple urine cytologies have come back positive by FISH for high-grade transitional cell carcinoma  - 9/10/18- Underwent a bilateral cystoureteroscopy with biopsy and brushing that showed  right upper tract cytology suspicious for high-grade urothelial ca. Right ureter brushing negative from that day. Left upper tract negative.  - 9/10/18- CT A/P without contrast showed new small bilateral pleural effusions and interstitial pulmonary edema but no evidence of locoregional or metastatic disease.  - 9/12/18- Presented to ED with oliguria, CRESENCIO, and mild hydronephrosis bilaterally on CT scan and underwent bilateral ureteral stent placment  - 11/13/2018- Right robotic nephroureterectomy, excision of  "sandip-caval mass and LN excision by Stuart King. Pathology showed \"Right nephroureterectomy: Invasive high grade urothelial carcinoma measuring 3.5 cm, located in renal pelvis and invading through renal parenchyma into perinephric fat. Urothelial carcinoma in-situ present. Margins free of tumor. Sandip-caval mass: Metastatic urothelial carcinoma involving one lymph node with at least 1 cm tumor deposit. Pericaval Extranodal Extension present. Five additional benign pericaval lymph nodes. Pathologic stage: pT4N1 (1 of 6 lymph nodes).\"  - 12/11/18- PET/CT whole body demonstrated significant residual FDG avid disease. For example, \"there is an FDG avid ill-defined pericaval mass immediately medial to the surgical clips measuring approximately 2.0 x 1.4 cm, with max SUV measuring up to12.2, see series 4 image 21. Additional FDG avid retrocaval and interaortocaval lymphadenopathy are noted.There is a 1.2 cm nodule in the right hemipelvis posterior lateral tothe bladder with max SUV measuring 8.3, see series 4 image 77. The bladder is incompletely distended.\" No suspicious thoracic or bone uptake.  - 12/12/18- Started adjuvant chemotherapy (carbo+gem) per POUT trial. Cycle 2 - 1/2/19. Cycle 3 - 1/23/19. Cycle 4 - 02/13/19.  - 1/22/19 - CT C/A/P with contrast compared with prior PET/CT: \"1. New well-circumscribed soft tissue pulmonary nodules of the right lower lobe and left upper lobe. Findings could be infectious, inflammatory, or represent metastatic disease. Continued attention on follow-up recommended. Postoperative changes of right nephrectomy. No evidence for local recurrence in the present study.\"  - 3/1/2019- CT C/A/P with contrast - \"1. Bilateral pulmonary nodules measuring up to 4 mm in the right lower lobe, previously measuring 8 mm. No new or enlarging pulmonary nodules. 2. No convincing evidence for metastatic disease in the abdomen, pelvis and bones.\"  - 6/3/2019- CT C/A/P with contrast - \"1. Surgical " "changes of right nephrectomy without evidence of residual or recurrent disease on this noncontrast exam.  Consider contrast enhanced examination on follow-up. 2. No evidence of metastatic disease in the abdomen, or pelvis on noncontrast CT.  Scattered tiny pulmonary nodules in the chest are not significantly changed.  Previously described prominent right lower lobe pulmonary nodule (previously measuring up to 8 mm) is no longer visualized. 3. Stable bilateral pulmonary nodules measuring maximally 4 mm.\"  - 09/11/19: CT C/A/P without contrast - \"1. Stable exam without evidence convincing for new disease. 2. Stable pulmonary nodules. 3. 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 4. Stable heterogeneous enlargement of the thyroid with underlying nodules suggested.\"  - 12/4/19: CT C/A/P without contrast - \"No acute change since prior exam. No evidence of recurrent or metastatic disease. Tiny lung nodules are stable. Prior right nephrectomy. Left renal artery aneurysm is stable.\"  - 04/08/20, 7/27/20: CT C/A/P with contrast - GABRIELLE.  - 01/12/21: CT C/A/P with contrast - \"1.  Slight increased size of a 6 mm left upper lobe nodule and new 4 mm linear opacity along the right major fissure. These are indeterminate but could represent progression of metastatic disease. 2.  Slight increased size of mildly enlarged mediastinal and hilar lymph nodes. 3.  Mild pulmonary edema. 4.  No recurrent or metastatic disease in the abdomen and pelvis. 5.  Mild stranding around the urinary bladder dome may be related to cystitis. Punctate focus of gas within the urinary bladder either secondary to infection or instrumentation. 6.  Enlarged multinodular thyroid gland.\"    2. Non-muscle invasive bladder cancer:  - 10/3/19: Cystoscopy showed a small area of erythema on retroflexion, possibly pre-existing or due to retroflexion of the scope. Urine cytology from that time showed cells suspicious for malignancy.   - 1/13/20: Bladder " biopsy showed high grade urothelial carcinoma in-situ  - 2/12/20-3/18/20: Intravesical BCG therapy  - 7/24/20 and last cystoscopy was on the same day. It was negative. However, urine cytology was positive for high-grade urothelial carcinoma.   - 11/25/20-12/10/2020: 3 weekly doses of intravesical BCG 50mg.   - 12/10/20: Cytology suspicious and FISH urovysion positive for TCC.    REVIEW OF SYSTEMS: 14 point ROS negative other than the symptoms noted above in the HPI.    PAST MEDICAL AND SURGICAL HISTORY:   Past Medical History:   Diagnosis Date     Calculus of kidney      Chemotherapy-induced neutropenia (H) 3/6/2019     Esophageal reflux      GERD (gastroesophageal reflux disease)      Hyperlipidemia LDL goal <130 5/9/2010     Malignant melanoma of skin of trunk, except scrotum (H)      Nonspecific abnormal finding     has living will 2004 -      Nontoxic multinodular goiter     no further eval /tx rec per pt     Osteopenia      Other psoriasis      Personal history of colonic polyps      PMR (polymyalgia rheumatica) (H)      Stress-induced cardiomyopathy      Undiagnosed cardiac murmurs      Unspecified constipation      Unspecified essential hypertension      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     Past Surgical History:   Procedure Laterality Date     ARTHROPLASTY KNEE Right 11/9/2020    Procedure: ARTHROPLASTY, KNEE, TOTAL, RIGHT;  Surgeon: Edward Otero MD;  [facility]     BIOPSY       COLONOSCOPY  2014     COMBINED CYSTOSCOPY, INSERT STENT URETER(S) Bilateral 9/12/2018    Procedure: COMBINED CYSTOSCOPY, INSERT STENT URETER(S);  Cystoscopy Bilateral ureteral Stent Placement.;  Surgeon: Justin Henry MD;  [facility]     COMBINED CYSTOSCOPY, RETROGRADES, URETEROSCOPY, INSERT STENT Bilateral 4/3/2018    Procedure: COMBINED CYSTOSCOPY, RETROGRADES, URETEROSCOPY, INSERT STENT;;  Surgeon: Stuart King MD;  [facility]     COMBINED CYSTOSCOPY, RETROGRADES, URETEROSCOPY, INSERT  STENT Bilateral 9/10/2018    Procedure: COMBINED CYSTOSCOPY, RETROGRADES, URETEROSCOPY, INSERT STENT;  Cystoscopy, Bilateral Ureteroscopy, Bladder Biopsies, Retrogram Pyelograms, Ureteral Washings and brushings, cysview;  Surgeon: Stuart King MD;  [facility]     COMBINED CYSTOSCOPY, RETROGRADES, URETEROSCOPY, INSERT STENT Left 8/26/2020    Procedure: Cystoscopy, Left Ureteral Wash, Left ureteral Retrograde Pyelogram;  Surgeon: Justin Henry MD;  [facility]     CYSTOSCOPY, BIOPSY BLADDER INSTILL OPTICAL AGENT N/A 4/3/2018    Procedure: CYSTOSCOPY, BIOPSY BLADDER INSTILL OPTICAL AGENT;  Cystoscopy, Blue Light Cystoscopy, Bladder Biopsies, Bilateral Selective ureteral washings for Cytology, Bilateral Retrograde Pyelograms, Bilateral Ureteroscopy;  Surgeon: Stuart King MD;  [facility]     CYSTOSCOPY, BIOPSY BLADDER, COMBINED N/A 2/19/2018    Procedure: COMBINED CYSTOSCOPY, BIOPSY BLADDER;  Cystoscopy, Bladder Biopsy;  Surgeon: Kenna La MD;  [facility]     CYSTOSCOPY, BIOPSY BLADDER, COMBINED N/A 8/26/2020    Procedure: Cystoscopy, biopsy bladder, combined;  Surgeon: Justin Henry MD;  [facility]     CYSTOSCOPY, FULGURATE BLADDER TUMOR, COMBINED N/A 1/13/2020    Procedure: CYSTOSCOPY, WITH  bladder biopsies and fulguration;  Surgeon: Stuart King MD;  [facility]     CYSTOSCOPY, REMOVE STENT(S), COMBINED  11/13/2018    Procedure: Flexible Cystoscopy with Stent Removal;  Surgeon: Stuart King MD;  [facility]     DAVINCI LYMPHADENECTOMY N/A 11/13/2018    Procedure: Davinci Lymphadenectomy ;  Surgeon: Stuart King MD;  [facility]     DAVINCI NEPHROURETERECTOMY N/A 11/13/2018    Procedure: Right DaVinci Assisted Nephroureterectomy;  Surgeon: Stuart King MD;  [facility]     ENDOSCOPIC ULTRASOUND LOWER GASTROINTESTIONAL TRACT (GI) N/A 10/30/2015    Procedure: ENDOSCOPIC  ULTRASOUND LOWER GASTROINTESTIONAL TRACT (GI);  Surgeon: Daniel Jean-Bpatiste MD;  [facility]     EYE SURGERY  12/4/17     INSERT PORT VASCULAR ACCESS Right 12/19/2018    Procedure: Chest Port Placement - right;  Surgeon: Stuart Chavez PA-C;  [facility]     IR CHEST PORT PLACEMENT > 5 YRS OF AGE  12/19/2018     IR PORT REMOVAL RIGHT  6/26/2019     LAPAROSCOPIC CHOLECYSTECTOMY WITH CHOLANGIOGRAMS N/A 11/1/2015    Procedure: LAPAROSCOPIC CHOLECYSTECTOMY WITH CHOLANGIOGRAMS;  Surgeon: Tonie Warren MD;  [facility]     REMOVE PORT VASCULAR ACCESS Right 6/26/2019    Procedure: Right Port Removal;  Surgeon: Froilan Irizarry PA-C;  [facility]     SURGICAL HISTORY OF -   1996    malignant melanoma     SURGICAL HISTORY OF -   1968    thyroid nodule     SURGICAL HISTORY OF -       D & C     ZZC NONSPECIFIC PROCEDURE  2005    colonoscopy polyp repeat 2010     SOCIAL HISTORY:   Social History     Tobacco Use     Smoking status: Never Smoker     Smokeless tobacco: Never Used   Substance Use Topics     Alcohol use: No     Alcohol/week: 0.0 standard drinks     Comment: rare     Drug use: No     FAMILY HISTORY:   Family History   Problem Relation Age of Onset     Cancer Father         dec - esophageal and laryngeal     Heart Disease Mother      Respiratory Mother         dec     Breast Cancer Daughter      Other Cancer Daughter      Thyroid Disease Daughter      Asthma Daughter      Hyperlipidemia Son      Diabetes Son      Anesthesia Reaction No family hx of      Deep Vein Thrombosis (DVT) No family hx of      ALLERGIES:   Allergies   Allergen Reactions     Codeine      CURRENT MEDICATIONS:   Current Outpatient Medications:      acetaminophen (TYLENOL) 325 MG tablet, Take 3 tablets (975 mg) by mouth every 8 hours as needed for pain, Disp: 1 Bottle, Rfl: 0     alendronate (FOSAMAX) 70 MG tablet, TAKE 1 TABLET EVERY 7 DAYS AT LEAST 60 MINUTES BEFORE BREAKFAST AS DIRECTED., Disp: 12  tablet, Rfl: 3     calcium carbonate 1250 (500 Ca) MG CHEW, Take 1 tablet by mouth, Disp: , Rfl:      calcium carbonate-vitamin D (OS-SHREYA) 500-400 MG-UNIT tablet, Take 1 tablet by mouth daily, Disp: , Rfl:      cycloSPORINE (RESTASIS) 0.05 % ophthalmic emulsion, Place 1 drop into both eyes 2 times daily, Disp: , Rfl:      ferrous sulfate 325 (65 Fe) MG TBEC EC tablet, Take 325 mg by mouth every morning , Disp: , Rfl:      furosemide (LASIX) 20 MG tablet, TAKE 1 TABLET (20 MG) BY MOUTH EVERY MORNING, Disp: 90 tablet, Rfl: 3     hydrOXYzine (ATARAX) 10 MG tablet, Take 1 tablet (10 mg) by mouth every 6 hours as needed for itching or other (adjuvant pain), Disp: 10 tablet, Rfl: 0     irbesartan (AVAPRO) 300 MG tablet, TAKE 1 TABLET EVERY DAY, Disp: 90 tablet, Rfl: 2     levofloxacin (LEVAQUIN) 500 MG tablet, Take one tablet 6 hours after treatment and one tablet the following morning after treatment., Disp: 6 tablet, Rfl: 0     lovastatin (MEVACOR) 40 MG tablet, Take 1 tablet (40 mg) by mouth At Bedtime, Disp: 90 tablet, Rfl: 3     melatonin 5 MG tablet, Take 5 mg by mouth At Bedtime, Disp: , Rfl:      methimazole (TAPAZOLE) 5 MG tablet, Take 1 tablet (5 mg) by mouth daily, Disp: 90 tablet, Rfl: 3     Omega-3 Fatty Acids (FISH OIL) 500 MG CAPS, Take 1 capsule by mouth every morning , Disp: , Rfl:      omeprazole (PRILOSEC) 20 MG DR capsule, TAKE 1 CAPSULE EVERY DAY, Disp: 90 capsule, Rfl: 1     ondansetron (ZOFRAN-ODT) 4 MG ODT tab, Take 1 tablet (4 mg) by mouth every 6 hours as needed for nausea or vomiting, Disp: 20 tablet, Rfl: 0     oxyCODONE (ROXICODONE) 5 MG tablet, Take 1 tablet (5 mg) by mouth every 4 hours as needed for moderate to severe pain, Disp: 20 tablet, Rfl: 0     polyethylene glycol (MIRALAX) 17 g packet, Take 17 g by mouth daily, Disp: 10 packet, Rfl: 0     Probiotic Product (PROBIOTIC ADVANCED PO), Take 1 capsule by mouth every morning , Disp: , Rfl:      propranolol ER (INDERAL LA) 60 MG 24 hr  capsule, Take 1 capsule (60 mg) by mouth daily, Disp: 90 capsule, Rfl: 2     rivaroxaban ANTICOAGULANT (XARELTO) 15 MG TABS tablet, Take 1 tablet (15 mg) by mouth daily (with dinner), Disp: 90 tablet, Rfl: 0     rOPINIRole (REQUIP) 0.25 MG tablet, TAKE 1 TABLET IN THE AFTERNOON AND TAKE 2 TABLETS EVERY NIGHT, Disp: 270 tablet, Rfl: 3     senna-docusate (SENOKOT-S/PERICOLACE) 8.6-50 MG tablet, Take 1-2 tablets by mouth 2 times daily as needed for constipation, Disp: 30 tablet, Rfl: 0     sertraline (ZOLOFT) 100 MG tablet, TAKE 1 TABLET EVERY MORNING, Disp: 90 tablet, Rfl: 0     traZODone (DESYREL) 50 MG tablet, TAKE 1/2 TABLET AT BEDTIME, Disp: 45 tablet, Rfl: 2    PHYSICAL EXAMINATION:  Deferred. Phone visit due to COVID.    LABORATORY DATA:   Recent Labs   Lab Test 05/11/21  1007 01/28/21  1340 01/12/21  1230 11/11/20  0617 11/10/20  1536    136 133 134 130*   POTASSIUM 4.2 4.4 5.6* 4.2 3.8   CHLORIDE 101 103 100 105 100   CO2 29 26 31 25 23   ANIONGAP 8 7 2* 4 7   BUN 24 20 22 16 20   CR 1.04 1.02 0.97 1.06* 1.08*   * 93 110* 89 140*   SHREYA 9.3 10.1 8.6 7.7* 7.3*     Recent Labs   Lab Test 02/03/19  2102 12/12/18  1050 01/16/18  1247 01/16/18  0646 12/13/17  2236 04/18/16  0905 11/02/15  0912 11/01/15  0727 10/31/15  0810   MAG 1.8 2.1 2.1 2.0 2.0  --   --  2.0  --    PHOS  --   --   --   --  2.4* 3.2 3.2 2.1* 2.4*     Recent Labs   Lab Test 05/11/21  1007 01/12/21  1230 11/11/20  0617 11/10/20  0654 10/28/20  1244 09/03/20  1213 08/10/20  1017 07/20/20  1828 04/08/20  1027   WBC 7.1 6.2  --   --  6.4 11.2* 6.2 7.1 6.3   HGB 12.6 11.7 9.9* 10.3* 13.0 13.0 13.1 12.9 12.6    171  --   --  218 224 231 224 202   MCV 98 100  --   --  98 99 100 99 98   NEUTROPHIL 72.6 67.6  --   --   --  77.6  --  64.3 69.2     Recent Labs   Lab Test 05/11/21  1007 01/28/21  1340 01/12/21  1230 07/17/18  1346 07/17/18  1346   BILITOTAL 0.5 0.4 0.4   < >  --    ALKPHOS 94 86 91   < >  --    ALT 18 21 18   < >  --     AST 10 16 16   < >  --    ALBUMIN 3.6 3.9 3.2*   < >  --    LDH  --   --   --   --  204    < > = values in this interval not displayed.     TSH   Date Value Ref Range Status   01/27/2021 2.42 0.40 - 4.00 mU/L Final   07/27/2020 3.39 0.40 - 4.00 mU/L Final   07/20/2020 3.08 0.40 - 4.00 mU/L Final     No results for input(s): CEA in the last 86524 hours.  Results for orders placed or performed in visit on 05/11/21   CT Chest Abdomen Pelvis w/o Contrast    Narrative    CT CHEST, ABDOMEN AND PELVIS WITHOUT CONTRAST  5/11/2021 10:27 AM    CLINICAL HISTORY: 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).  Restaging.    TECHNIQUE: CT scan of the chest, abdomen, and pelvis was performed  without IV contrast. Multiplanar reformats were obtained. Dose  reduction techniques were used.   CONTRAST: None.  COMPARISON: CT of the chest performed 3/8/2021. CT of the chest,  abdomen, and pelvis performed 1/12/2021.    FINDINGS:   LUNGS AND PLEURA: Few small calcified granulomas in the right lung. An  indeterminate 0.5 cm pulmonary nodule in the left upper lobe (series 6  image 92) is unchanged. Scattered smaller indeterminate pulmonary  nodules in both lungs are also unchanged. No new or enlarging  pulmonary nodules are identified. No pleural effusion.    MEDIASTINUM/AXILLAE: No enlarged lymph nodes are identified in the  chest. No pericardial effusion. Atherosclerotic calcification of the  thoracic aorta. Diffuse enlargement of the thyroid gland, right  greater than left, is partially included on this exam, and has a  similar appearance to the previous exam. Small to moderate hiatal  hernia.    CORONARY ARTERY CALCIFICATION: None.    HEPATOBILIARY: Prior cholecystectomy. No hepatic masses are seen.    PANCREAS: Normal.    SPLEEN: Normal.    ADRENAL GLANDS: Normal.    KIDNEYS/BLADDER: Postoperative changes of right nephroureterostomy are  again noted. Nonobstructing stone in the interpolar region of the left  kidney measures 0.5  cm. The left kidney is otherwise unremarkable. No  hydronephrosis. The urinary bladder is unremarkable.    BOWEL: No bowel obstruction. No convincing evidence for colitis or  diverticulitis. Unremarkable appendix.    PELVIC ORGANS: Unremarkable.    LYMPH NODES: No enlarged lymph nodes are identified in the abdomen or  pelvis.    VASCULATURE: Mild atherosclerotic aortoiliac calcification. 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    ADDITIONAL FINDINGS: None.    MUSCULOSKELETAL: Degenerative changes are noted throughout the  thoracolumbar spine.      Impression    IMPRESSION:   1.  Scattered indeterminate pulmonary nodules in both lungs are  unchanged, with the largest in the left upper lobe measuring 0.5 cm.  2.  Postoperative changes of prior right nephroureterectomy.  3.  Nonobstructing 0.5 cm stone in the interpolar region of the right kidney.  4.  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  5.  Diffuse thyroid gland enlargement, greater on the right, is again  noted. Consider thyroid ultrasound for further evaluation.    MARAL KHOURY MD     *Note: Due to a large number of results and/or encounters for the requested time period, some results have not been displayed. A complete set of results can be found in Results Review.         ASSESSMENT AND PLAN: Ms. 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    1. Upper tract urothelial cancer:   - She completed 4 cycles of adjuvant carboplatin plus gemcitabine chemotherapy per POUT trial.    She completed chemotherapy in Feb 2019 over 2 yrs ago  - No residual side effects or evidence of recurrence.  - Reviewed restaging CT scan from Jan and March and now May 2021; there is no clear evidence of  disease recurrence in abd/pelvis.   She continues to have pulmonary nodules which remain stable.    Lung findings are non-specific and these are also not responsible for her shortness of breath    - We will continue to monitor and her get a repeat CT C/A/P without contrast in 3-4 months.     2. High grade urothelial carcinoma in situ:  - Bx proven carcinoma in situ in 1/2020, completed the first round of intravesical BCG treatment 2/2020-3/2020 and second in 11/2020-12/2020.  - She was initially followed by Dr. King and now is under care of Dr. Froilan Henry.   - She had a cystoscopy done 2 weeks ago which has been unremarkable. She will continue on 3 weekly BCG every 3 months as part of her maintenance  - She could be a candidate for mitogel v nivo+TRINI clinical trial (PI: Joseph Dunn MD) if she has BCG failure    3. Chemo induced anemia and thrombocytopenia:  - Resolved.     4. Persistent HERMAN:  - She follows Dr. Griffith in Cardiology for her h/o moderate AORTIC STENOSIS, CHF, and Afib now with worsening SOA and lymphedema with no cancer-related etiology evident.     Return to clinic in 3-4 months with restaging scans.    All of the above was explained to the patient in lay language and several questions were answered. They are in agreement with the plan.      Telephone visit duration - 23 mins    Sd Fine    Hematologist and Medical Oncologist  [facility]

## 2021-05-14 ENCOUNTER — INFUSION THERAPY VISIT (OUTPATIENT)
Dept: ONCOLOGY | Facility: CLINIC | Age: 86
End: 2021-05-14
Attending: UROLOGY
Payer: MEDICARE

## 2021-05-14 VITALS
TEMPERATURE: 98.1 F | RESPIRATION RATE: 18 BRPM | SYSTOLIC BLOOD PRESSURE: 132 MMHG | HEART RATE: 65 BPM | DIASTOLIC BLOOD PRESSURE: 73 MMHG | OXYGEN SATURATION: 96 %

## 2021-05-14 DIAGNOSIS — C66.1 UROTHELIAL CARCINOMA OF RIGHT DISTAL URETER (H): ICD-10-CM

## 2021-05-14 DIAGNOSIS — C68.9 UROTHELIAL CANCER (H): Primary | ICD-10-CM

## 2021-05-14 DIAGNOSIS — C68.9 UROTHELIAL CARCINOMA (H): ICD-10-CM

## 2021-05-14 DIAGNOSIS — C67.8 MALIGNANT NEOPLASM OF OVERLAPPING SITES OF BLADDER (H): ICD-10-CM

## 2021-05-14 LAB
ALBUMIN UR-MCNC: NEGATIVE MG/DL
APPEARANCE UR: CLEAR
BILIRUB UR QL STRIP: NEGATIVE
COLOR UR AUTO: NORMAL
COPATH REPORT: NORMAL
GLUCOSE UR STRIP-MCNC: NEGATIVE MG/DL
HGB UR QL STRIP: NEGATIVE
KETONES UR STRIP-MCNC: NEGATIVE MG/DL
LEUKOCYTE ESTERASE UR QL STRIP: NEGATIVE
NITRATE UR QL: NEGATIVE
PH UR STRIP: 5 PH (ref 5–7)
SOURCE: NORMAL
SP GR UR STRIP: 1.01 (ref 1–1.03)
UROBILINOGEN UR STRIP-MCNC: 0 MG/DL (ref 0–2)

## 2021-05-14 PROCEDURE — 51720 TREATMENT OF BLADDER LESION: CPT

## 2021-05-14 PROCEDURE — 250N000011 HC RX IP 250 OP 636: Performed by: UROLOGY

## 2021-05-14 PROCEDURE — 81003 URINALYSIS AUTO W/O SCOPE: CPT | Performed by: UROLOGY

## 2021-05-14 RX ORDER — LIDOCAINE HYDROCHLORIDE 20 MG/ML
10 JELLY TOPICAL
Status: CANCELLED | OUTPATIENT
Start: 2021-05-27

## 2021-05-14 RX ORDER — LIDOCAINE HYDROCHLORIDE 20 MG/ML
10 JELLY TOPICAL
Status: CANCELLED | OUTPATIENT
Start: 2021-05-14

## 2021-05-14 RX ORDER — LIDOCAINE HYDROCHLORIDE 20 MG/ML
10 JELLY TOPICAL
Status: CANCELLED | OUTPATIENT
Start: 2021-05-20

## 2021-05-14 RX ORDER — LEVOFLOXACIN 500 MG/1
TABLET, FILM COATED ORAL
Qty: 6 TABLET | Refills: 11 | Status: SHIPPED | OUTPATIENT
Start: 2021-05-14 | End: 2021-10-27

## 2021-05-14 RX ADMIN — BACILLUS CALMETTE-GUERIN 50 MG: 50 POWDER, FOR SUSPENSION INTRAVESICAL at 13:20

## 2021-05-14 ASSESSMENT — PAIN SCALES - GENERAL: PAINLEVEL: NO PAIN (0)

## 2021-05-14 NOTE — PATIENT INSTRUCTIONS
Home discharge instructions:  -To make sure each treatment has the best chance of working, follow these steps 2 hours after each treatment                        1. Lie on your back for 15 minues                        2. Lie on your left side for 15 mintues                        3. Lie on your right side for 15 minutes                        4. Get up but keep the medication  In your bladder for 60 more minutes- for a total of 2 hours                        5. Empty your bladder following the directions below (if you have difficulty holding your bladder it is ok to empty your bladder early)  -Wear pad if incontinence is a possibliity  -Wash hands throughly after using the bathroom  -For safety reasons remember to follow these directions for 6 hours after each treatment:                        1. Sit on the toilet seat to urinate                        2. Immediately after urinating- add 2 cups of undiluted chlorine bleach to the toilet                         3. Close lid and let the bleach sit for 15 minutes each time                        4. Drink plenty of water to flush any remaining medication out of your bladder     **These steps will help kill all the BCG bacteria and disinfect the toilet**        Take your antibiotic today at 7:30pm and tomorrow morning.        Please call urology triage line at [phone] or [phone] with fevers or chills, burning with urination, or blood in your urine which lasts more than a 48-72 hours or with any question or concerns.

## 2021-05-14 NOTE — PROGRESS NOTES
Infusion Nursing Note:  Dimple Oviedo presents today for Maintenance BCG.  Instillation number 1 of 3.   Patient seen by provider today: No   present during visit today: Not Applicable.    Note: Dimple presents to infusion today stating she feels well. She denies any urinary symptoms at baseline and denies any questions or concerns today. She denies any pain.     Pre-procedure Assessment:  Dysuria: No  Hematuria: No  Fever/chills: No  Increased urinary urgency: No  Increased urinary frequency: No    Labs Reviewed:  Urine analysis.  Results meet treatment conditions.     UA RESULTS:  Lab Test 05/14/21  1155   COLOR Straw   APPEARANCE Clear   URINEGLC Negative   URINEBILI Negative   URINEKETONE Negative   SG 1.009   UBLD Negative   URINEPH 5.0   PROTEIN Negative   UROBILINOGEN  --    NITRITE Negative   LEUKEST Negative   RBCU  --    WBCU  --        Treatment Conditions:   Patient states no concerns or issues at this time.   No visible blood noted in today's urine sample.   Ok to proceed with treatment.     Procedure:  16F Diehl catheter placed.  Catheter placed without trauma.  Clear/yellow urine drained from bladder.    Patient turned every 15 minutes per protocol: Yes, turning completed at clinic per protocol.  Patient tolerated instillation for two hours without incident.       Patient instructed on the following and verbalized understanding:   Post instillation side effects and precautions discussed: YES  Levaquin to be taken 6 hours post-instillation and tomorrow morning: YES    Discharge Plan:   Prescription refills given for Levaquin.  Discharge instructions reviewed with: Patient.  Patient and/or family verbalized understanding of discharge instructions and all questions answered.  Copy of AVS reviewed with patient and/or family.  Patient will return 5/20/21 for next appointment.  Patient discharged in stable condition accompanied by: self.  Departure Mode: Ambulatory.    Tatiana Lopez RN

## 2021-05-20 ENCOUNTER — INFUSION THERAPY VISIT (OUTPATIENT)
Dept: ONCOLOGY | Facility: CLINIC | Age: 86
End: 2021-05-20
Attending: UROLOGY
Payer: MEDICARE

## 2021-05-20 VITALS
HEART RATE: 65 BPM | OXYGEN SATURATION: 97 % | DIASTOLIC BLOOD PRESSURE: 53 MMHG | SYSTOLIC BLOOD PRESSURE: 132 MMHG | TEMPERATURE: 98 F

## 2021-05-20 DIAGNOSIS — C66.1 UROTHELIAL CARCINOMA OF RIGHT DISTAL URETER (H): ICD-10-CM

## 2021-05-20 DIAGNOSIS — C68.9 UROTHELIAL CARCINOMA (H): Primary | ICD-10-CM

## 2021-05-20 DIAGNOSIS — C68.9 UROTHELIAL CANCER (H): ICD-10-CM

## 2021-05-20 PROCEDURE — 81003 URINALYSIS AUTO W/O SCOPE: CPT | Performed by: UROLOGY

## 2021-05-20 PROCEDURE — 51720 TREATMENT OF BLADDER LESION: CPT

## 2021-05-20 PROCEDURE — 250N000011 HC RX IP 250 OP 636: Performed by: UROLOGY

## 2021-05-20 RX ADMIN — BACILLUS CALMETTE-GUERIN 50 MG: 50 POWDER, FOR SUSPENSION INTRAVESICAL at 12:48

## 2021-05-20 ASSESSMENT — PAIN SCALES - GENERAL: PAINLEVEL: NO PAIN (0)

## 2021-05-20 NOTE — PROGRESS NOTES
Infusion Nursing Note:  Dimple Oviedo presents today for BCG maintenance.  Instillation number 2 of 3.   Patient seen by provider today: No   present during visit today: Not Applicable.    Note: Stays to dwell.  Was able to hold her BCG for 2 hours last week.  Endorses feeling wiped out for the first 1-2 days following treatment and burning with urination for about 24 hours post treatment.  Denies fevers/chills.   Took her Levaquin as prescribed and has enough for today's treatment.  Completed the bleaching process as instructed.   Denies visible hematuria.    No visible blood seen in today's urine sent to lab.      Treatment Conditions:   Patient states no concerns or issues at this time.  Ok to proceed with treatment.     Labs Reviewed:  Urine analysis.  UA RESULTS:  Recent Labs   Lab Test 05/20/21  1150 09/14/20  1144 09/14/20  1144   COLOR Straw   < > Yellow   APPEARANCE Clear   < > Clear   URINEGLC Negative   < > Negative   URINEBILI Negative   < > Negative   URINEKETONE Negative   < > Negative   SG 1.008   < > <=1.005   UBLD Negative   < > Trace*   URINEPH 5.0   < > 5.5   PROTEIN Negative   < > Negative   UROBILINOGEN  --   --  0.2   NITRITE Negative   < > Negative   LEUKEST Negative   < > Small*   RBCU  --   --  O - 2   WBCU  --   --  5-10*    < > = values in this interval not displayed.       Procedure:  16F goldman catheter placed.  Catheter placed without trauma.  Clear/yellow urine drained from bladder.    Patient turned every 15 minutes per protocol: Yes, turning completed at clinic per protocol.  Bladder irrigated per protocol.  Patient tolerated instillation without incident.  BCG dwell time:  2 hours      Discharge Plan:   Patient declined prescription refills.  Copy of AVS reviewed with patient and/or family.  Patient will return 5/27/21 for next appointment.  Patient discharged in stable condition accompanied by: self.  Departure Mode: Ambulatory.  Face to Face time: 0.    Gemma Puga  RN, RN

## 2021-05-20 NOTE — PATIENT INSTRUCTIONS
Home discharge instructions:  -To make sure each treatment has the best chance of working, follow these steps 2 hours after each treatment   1. Lie on your back for 15 minues   2. Lie on your left side for 15 mintues   3. Lie on your right side for 15 minutes   4. Get up but keep the medication  In your bladder for 60 more minutes- for a total of 2 hours   5. Empty your bladder following the directions below (if you have difficulty holding your bladder it is ok to empty your bladder early)  -Wear pad if incontinence is a possibliity  -Wash hands throughly after using the bathroom  -For safety reasons remember to follow these directions for 6 hours after each treatment:  (for 6 hours after your empty the BCG from your bladder)   1. Sit on the toilet seat to urinate   2. Immediately after urinating- add 2 cups of undiluted chlorine bleach to the toilet    3. Close lid and let the bleach sit for 15 minutes each time   4. Drink plenty of water to flush any remaining medication out of your bladder    **These steps will help kill all the BCG bacteria and disinfect the toilet**    Please void BCG at 2:50pm    Take your antibiotic today at 6pm and tomorrow morning.      Please call urology triage line at [phone] with fevers or chills, burning with urination, or blood in your urine which lasts more than a 48-72 hours or with any question or concerns.      May 2021      [place] Monday Tuesday Wednesday Thursday Friday Saturday                                 1       2     3     4     5     6     7     8       9     10     11    LAB  10:15 AM   (15 min.)    LAB   [facility] [place]    CT CHEST ABDOMEN PELVIS WO  10:40 AM   (20 min.)   [id number]   [facility] [place]    TELEPHONE VISIT RETURN   1:00 PM   (30 min.)   Sd Fine MD   [facility] 12     13     14    ONC INFUSION 3 HR (180 MIN)  12:00 PM   (180 min.)   UC ONC INFUSION  NURSE   [facility] 15       16     17     18     19     20    ONC INFUSION 3 HR (180 MIN)  12:00 PM   (180 min.)    ONC INFUSION NURSE   [facility] 21     22       23     24     25     26     27    ONC INFUSION 3 HR (180 MIN)  12:00 PM   (180 min.)    ONC INFUSION NURSE   [facility] 28     29       30     31                                          June 2021 Sunday Monday Tuesday Wednesday Thursday Friday Saturday             1     2     3     4     5       6     7     8     9     10    VIDEO VISIT RETURN   5:15 PM   (30 min.)   Butch Griffith MD   [facility] 11     12       13     14     15     16     17     18     19       20     21     22     23  Happy Birthday!     24     25     26       27     28     29     30

## 2021-05-26 VITALS — HEART RATE: 78 BPM | OXYGEN SATURATION: 97 % | TEMPERATURE: 97.8 F

## 2021-05-27 ENCOUNTER — INFUSION THERAPY VISIT (OUTPATIENT)
Dept: ONCOLOGY | Facility: CLINIC | Age: 86
End: 2021-05-27
Attending: UROLOGY
Payer: MEDICARE

## 2021-05-27 VITALS — OXYGEN SATURATION: 96 % | SYSTOLIC BLOOD PRESSURE: 149 MMHG | DIASTOLIC BLOOD PRESSURE: 73 MMHG | HEART RATE: 73 BPM

## 2021-05-27 VITALS
HEART RATE: 75 BPM | OXYGEN SATURATION: 98 % | DIASTOLIC BLOOD PRESSURE: 69 MMHG | SYSTOLIC BLOOD PRESSURE: 125 MMHG | TEMPERATURE: 97.2 F

## 2021-05-27 VITALS
HEART RATE: 76 BPM | OXYGEN SATURATION: 97 % | TEMPERATURE: 98.1 F | DIASTOLIC BLOOD PRESSURE: 71 MMHG | SYSTOLIC BLOOD PRESSURE: 118 MMHG

## 2021-05-27 VITALS
TEMPERATURE: 97.2 F | SYSTOLIC BLOOD PRESSURE: 133 MMHG | OXYGEN SATURATION: 98 % | DIASTOLIC BLOOD PRESSURE: 79 MMHG | HEART RATE: 87 BPM

## 2021-05-27 VITALS
TEMPERATURE: 99.2 F | SYSTOLIC BLOOD PRESSURE: 147 MMHG | HEART RATE: 66 BPM | DIASTOLIC BLOOD PRESSURE: 75 MMHG | OXYGEN SATURATION: 96 % | RESPIRATION RATE: 16 BRPM

## 2021-05-27 VITALS
OXYGEN SATURATION: 98 % | DIASTOLIC BLOOD PRESSURE: 76 MMHG | SYSTOLIC BLOOD PRESSURE: 122 MMHG | TEMPERATURE: 97.2 F | HEART RATE: 78 BPM

## 2021-05-27 VITALS
TEMPERATURE: 98.2 F | HEART RATE: 66 BPM | SYSTOLIC BLOOD PRESSURE: 132 MMHG | DIASTOLIC BLOOD PRESSURE: 84 MMHG | OXYGEN SATURATION: 98 %

## 2021-05-27 DIAGNOSIS — C68.9 UROTHELIAL CARCINOMA (H): Primary | ICD-10-CM

## 2021-05-27 DIAGNOSIS — E05.00 GRAVES DISEASE: ICD-10-CM

## 2021-05-27 DIAGNOSIS — C66.1 UROTHELIAL CARCINOMA OF RIGHT DISTAL URETER (H): ICD-10-CM

## 2021-05-27 DIAGNOSIS — C68.9 UROTHELIAL CANCER (H): ICD-10-CM

## 2021-05-27 LAB
ALBUMIN UR-MCNC: NEGATIVE MG/DL
APPEARANCE UR: CLEAR
BILIRUB UR QL STRIP: NEGATIVE
COLOR UR AUTO: NORMAL
GLUCOSE UR STRIP-MCNC: NEGATIVE MG/DL
HGB UR QL STRIP: NEGATIVE
KETONES UR STRIP-MCNC: NEGATIVE MG/DL
LEUKOCYTE ESTERASE UR QL STRIP: NEGATIVE
NITRATE UR QL: NEGATIVE
PH UR STRIP: 5 PH (ref 5–7)
SOURCE: NORMAL
SP GR UR STRIP: 1.01 (ref 1–1.03)
T3 SERPL-MCNC: 81 NG/DL (ref 60–181)
T4 FREE SERPL-MCNC: 0.62 NG/DL (ref 0.76–1.46)
TSH SERPL DL<=0.005 MIU/L-ACNC: 1.93 MU/L (ref 0.4–4)
UROBILINOGEN UR STRIP-MCNC: 0 MG/DL (ref 0–2)

## 2021-05-27 PROCEDURE — 84480 ASSAY TRIIODOTHYRONINE (T3): CPT | Performed by: INTERNAL MEDICINE

## 2021-05-27 PROCEDURE — 250N000011 HC RX IP 250 OP 636: Performed by: UROLOGY

## 2021-05-27 PROCEDURE — 81003 URINALYSIS AUTO W/O SCOPE: CPT | Performed by: UROLOGY

## 2021-05-27 PROCEDURE — 84439 ASSAY OF FREE THYROXINE: CPT | Performed by: INTERNAL MEDICINE

## 2021-05-27 PROCEDURE — 36415 COLL VENOUS BLD VENIPUNCTURE: CPT

## 2021-05-27 PROCEDURE — 84445 ASSAY OF TSI GLOBULIN: CPT | Performed by: INTERNAL MEDICINE

## 2021-05-27 PROCEDURE — 88120 CYTP URNE 3-5 PROBES EA SPEC: CPT | Mod: 26 | Performed by: PATHOLOGY

## 2021-05-27 PROCEDURE — 88112 CYTOPATH CELL ENHANCE TECH: CPT | Mod: 26 | Performed by: PATHOLOGY

## 2021-05-27 PROCEDURE — 84443 ASSAY THYROID STIM HORMONE: CPT | Performed by: INTERNAL MEDICINE

## 2021-05-27 PROCEDURE — 51720 TREATMENT OF BLADDER LESION: CPT

## 2021-05-27 PROCEDURE — 88112 CYTOPATH CELL ENHANCE TECH: CPT | Mod: TC | Performed by: UROLOGY

## 2021-05-27 PROCEDURE — 88120 CYTP URNE 3-5 PROBES EA SPEC: CPT | Mod: TC | Performed by: UROLOGY

## 2021-05-27 RX ADMIN — BACILLUS CALMETTE-GUERIN 50 MG: 50 POWDER, FOR SUSPENSION INTRAVESICAL at 13:24

## 2021-05-27 ASSESSMENT — PAIN SCALES - GENERAL: PAINLEVEL: NO PAIN (0)

## 2021-05-27 NOTE — PATIENT INSTRUCTIONS
Home discharge instructions:  -To make sure each treatment has the best chance of working, follow these steps 2 hours after each treatment                        1. Lie on your back for 15 minues                        2. Lie on your left side for 15 mintues                        3. Lie on your right side for 15 minutes                        4. Get up but keep the medication  In your bladder for 60 more minutes- for a total of 2 hours                        5. Empty your bladder following the directions below (if you have difficulty holding your bladder it is ok to empty your bladder early)  -Wear pad if incontinence is a possibliity  -Wash hands throughly after using the bathroom  -For safety reasons remember to follow these directions for 6 hours after each treatment:                        1. Sit on the toilet seat to urinate                        2. Immediately after urinating- add 2 cups of undiluted chlorine bleach to the toilet                         3. Close lid and let the bleach sit for 15 minutes each time                        4. Drink plenty of water to flush any remaining medication out of your bladder     **These steps will help kill all the BCG bacteria and disinfect the toilet**     Please void BCG at 3:30 pm     Take your antibiotic today at 7:30pm and tomorrow morning.        Please call urology triage line at [phone] or [phone] with fevers or chills, burning with urination, or blood in your urine which lasts more than a 48-72 hours or with any question or concerns.

## 2021-05-27 NOTE — PROGRESS NOTES
Infusion Nursing Note:  Dimple Oviedo presents today for Maintenance BCG.  Instillation number 3 of 3.       Note: Pt notes that last week went well.  However she did have flu like symptoms that night.  C/o chills in the evening.  Never developed a fever.  Woke in the morning and chills had resolved, but she noted being achy and c/o a headache.   This lasted about 24 hours.  These symptoms have since resolved.    Pre-procedure Assessment:  Dysuria:  No  Hematuria:  No  Fever/chills:  No  Increased urinary urgency:  No  Increased urinary frequency:  No    Labs Reviewed:  Urine analysis  No visible blood in urine.  UA RESULTS:  Recent Labs   Lab Test 05/27/21  1210     COLOR Straw     APPEARANCE Clear     URINEGLC Negative     URINEBILI Negative     URINEKETONE Negative     SG 1.009     UBLD Negative     URINEPH 5.0     PROTEIN Negative     UROBILINOGEN  --      NITRITE Negative     LEUKEST Negative     RBCU  --      WBCU  --       < > = values in this interval not displayed.       Treatment Conditions:   Patient states no concerns or issues at this time.  Ok to proceed with treatment.     Procedure:  16F Coude catheter placed.  Catheter placed without trauma.  Clear/yellow urine drained from bladder.    Patient turned every 15 minutes per protocol: Yes, turning completed at clinic per protocol.  Patient tolerated instillation without incident.    Patient instructed on the following and verbalized understanding:   Medication to remain in the bladder for 2 hours post instillation: YES  Post instillation side effects and precautions discussed: YES  Levaquin to be taken 6 hours post-instillation and tomorrow morning: YES    Discharge Plan:   Patient declined prescription refills.  AVS to patient via [employer].  Patient will return 8/13/21 for cystoscopy  Face to Face time: 0.    Tatiana Lopez RN

## 2021-06-04 DIAGNOSIS — E05.00 GRAVES DISEASE: Primary | ICD-10-CM

## 2021-06-04 DIAGNOSIS — E04.2 NONTOXIC MULTINODULAR GOITER: ICD-10-CM

## 2021-06-04 LAB
COPATH REPORT: NORMAL
TSI SER-ACNC: 3.1 TSI INDEX

## 2021-06-04 RX ORDER — METHIMAZOLE 5 MG/1
TABLET ORAL
Qty: 90 TABLET | Refills: 3 | Status: SHIPPED | OUTPATIENT
Start: 2021-06-04 | End: 2022-01-04

## 2021-06-04 NOTE — RESULT ENCOUNTER NOTE
MsRobert Oviedo,   Enclosed are a copy of your recent laboratory tests.  I have reviewed these results.  The results are as I would expect and I don't think any further laboratory tests are necessary at this time.  There may be other results pending.  If there are, I will send there on to you when they are complete.  You should plan to follow-up as we discussed at your recent visit.  Please let me know if you have any questions.  Thank you for choosing the [facility] for your care.  VICTORIA Henry MD.

## 2021-06-04 NOTE — RESULT ENCOUNTER NOTE
MsRobert Oviedo,   Enclosed are a copy of your recent laboratory tests.  I have reviewed these results.  The results are as I would expect and I don't think any further laboratory tests are necessary at this time.  There may be other results pending.  If there are, I will send there on to you when they are complete.  You should plan to follow-up as we discussed at your recent visit.  Please let me know if you have any questions.  Thank you for choosing the [facility] for your care.  VICTORIA Henry MD.

## 2021-06-04 NOTE — RESULT ENCOUNTER NOTE
MsRobert Oviedo,   Enclosed are a copy of your recent laboratory tests.  I have reviewed these results.  The results are as I would expect and I don't think any further laboratory tests are necessary at this time.  There may be other results pending.  If there are, I will send there on to you when they are complete.  You should plan to follow-up as we discussed at your recent visit.  Please let me know if you have any questions.  Thank you for choosing the [facility] for your care.  VICTORIA Henry MD.

## 2021-06-04 NOTE — RESULT ENCOUNTER NOTE
MsRobert Oviedo,   Enclosed are a copy of your recent laboratory tests.  I have reviewed these results.  The results are as I would expect and I don't think any further laboratory tests are necessary at this time.  There may be other results pending.  If there are, I will send there on to you when they are complete.  You should plan to follow-up as we discussed at your recent visit.  Please let me know if you have any questions.  Thank you for choosing the [facility] for your care.  VICTORIA Henry MD.

## 2021-06-10 ENCOUNTER — VIRTUAL VISIT (OUTPATIENT)
Dept: CARDIOLOGY | Facility: CLINIC | Age: 86
End: 2021-06-10
Attending: INTERNAL MEDICINE
Payer: MEDICARE

## 2021-06-10 DIAGNOSIS — I48.0 PAROXYSMAL ATRIAL FIBRILLATION (H): ICD-10-CM

## 2021-06-10 PROCEDURE — 99213 OFFICE O/P EST LOW 20 MIN: CPT | Mod: 95 | Performed by: INTERNAL MEDICINE

## 2021-06-10 NOTE — PROGRESS NOTES
"Electrophysiology Clinic Telephone Visit    Dimple Oviedo is a 87 year old female who is being evaluated via a billable telephone visit.      The patient has been notified of following:     \"This telephone visit will be conducted via a call between you and your physician/provider. We have found that certain health care needs can be provided without the need for a physical exam.  This service lets us provide the care you need with a short phone conversation.  If a prescription is necessary we can send it directly to your pharmacy.  If lab work is needed we can place an order for that and you can then stop by our lab to have the test done at a later time.    If during the course of the call the physician/provider feels a telephone visit is not appropriate, you will not be charged for this service.\"   Patient has given verbal consent for Telephone visit?  Yes    HPI:     Mrs. Oviedo is a very pleasant 87-year-old woman who has a complex past medical history including bladder malignancy, kidney removal, and hypertension, GERD and history of atrial fibrillation with rapid rates.  At one point she also had a stress induced cardiomyopathy with diminished ejection fraction but that has resolved.    Patient's daughter was on the phone with Mrs. Oviedo today.    At her last visit we did start rivaroxaban 15 mg daily for anticoagulation.  Today we visited to ascertain whether she was having any bleeding or other drug related issues.  Apparently she has had a few ecchymoses.  One was where her purse was abutting her hip and the others were on the forearm.  She does not seem to be bothered by these.  We agreed that she would continue the anticoagulant and that we would set up for her to have her prescription refilled with [employer] mail in at a later date when she calls to tell us that she is running low of her medications.    Mrs. Oviedo had no new cardiovascular complaints.  Her daughter seemed pleased with her progress.    PAST " MEDICAL HISTORY:  Past Medical History:   Diagnosis Date     Calculus of kidney      Chemotherapy-induced neutropenia (H) 3/6/2019     Esophageal reflux      GERD (gastroesophageal reflux disease)      Hyperlipidemia LDL goal <130 5/9/2010     Malignant melanoma of skin of trunk, except scrotum (H)      Nonspecific abnormal finding     has living will 2004 -      Nontoxic multinodular goiter     no further eval /tx rec per pt     Osteopenia      Other psoriasis      Personal history of colonic polyps      PMR (polymyalgia rheumatica) (H)      Stress-induced cardiomyopathy      Undiagnosed cardiac murmurs      Unspecified constipation      Unspecified essential hypertension      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 12 tablet 3     calcium carbonate 1250 (500 Ca) MG CHEW Take 1 tablet by mouth       calcium carbonate-vitamin D (OS-SHREYA) 500-400 MG-UNIT tablet Take 1 tablet by mouth daily       cycloSPORINE (RESTASIS) 0.05 % ophthalmic emulsion Place 1 drop into both eyes 2 times daily       ferrous sulfate 325 (65 Fe) MG TBEC EC tablet Take 325 mg by mouth every morning        furosemide (LASIX) 20 MG tablet TAKE 1 TABLET (20 MG) BY MOUTH EVERY MORNING 90 tablet 3     hydrOXYzine (ATARAX) 10 MG tablet Take 1 tablet (10 mg) by mouth every 6 hours as needed for itching or other (adjuvant pain) 10 tablet 0     irbesartan (AVAPRO) 300 MG tablet TAKE 1 TABLET EVERY DAY 90 tablet 2     levofloxacin (LEVAQUIN) 500 MG tablet Take one tablet 6 hours after treatment and one tablet the following morning after treatment. 6 tablet 11     lovastatin (MEVACOR) 40 MG tablet Take 1 tablet (40 mg) by mouth At Bedtime  90 tablet 3     melatonin 5 MG tablet Take 5 mg by mouth At Bedtime       methimazole (TAPAZOLE) 5 MG tablet Take 5 mg alternating with 2.5 mg every other day 90 tablet 3     Omega-3 Fatty Acids (FISH OIL) 500 MG CAPS Take 1 capsule by mouth every morning        omeprazole (PRILOSEC) 20 MG DR capsule TAKE 1 CAPSULE EVERY DAY 90 capsule 1     ondansetron (ZOFRAN-ODT) 4 MG ODT tab Take 1 tablet (4 mg) by mouth every 6 hours as needed for nausea or vomiting 20 tablet 0     oxyCODONE (ROXICODONE) 5 MG tablet Take 1 tablet (5 mg) by mouth every 4 hours as needed for moderate to severe pain 20 tablet 0     polyethylene glycol (MIRALAX) 17 g packet Take 17 g by mouth daily 10 packet 0     Probiotic Product (PROBIOTIC ADVANCED PO) Take 1 capsule by mouth every morning        propranolol ER (INDERAL LA) 60 MG 24 hr capsule Take 1 capsule (60 mg) by mouth daily 90 capsule 2     rivaroxaban ANTICOAGULANT (XARELTO) 15 MG TABS tablet Take 1 tablet (15 mg) by mouth daily (with dinner) 90 tablet 0     rOPINIRole (REQUIP) 0.25 MG tablet TAKE 1 TABLET IN THE AFTERNOON AND TAKE 2 TABLETS EVERY NIGHT 270 tablet 3     senna-docusate (SENOKOT-S/PERICOLACE) 8.6-50 MG tablet Take 1-2 tablets by mouth 2 times daily as needed for constipation 30 tablet 0     sertraline (ZOLOFT) 100 MG tablet TAKE 1 TABLET EVERY MORNING 90 tablet 0     traZODone (DESYREL) 50 MG tablet TAKE 1/2 TABLET AT BEDTIME 45 tablet 2       PAST SURGICAL HISTORY:  Past Surgical History:   Procedure Laterality Date     ARTHROPLASTY KNEE Right 11/9/2020    Procedure: ARTHROPLASTY, KNEE, TOTAL, RIGHT;  Surgeon: Edward Otero MD;  [facility]     BIOPSY       COLONOSCOPY  2014     COMBINED CYSTOSCOPY, INSERT STENT URETER(S) Bilateral 9/12/2018    Procedure: COMBINED CYSTOSCOPY, INSERT STENT URETER(S);  Cystoscopy Bilateral ureteral Stent Placement.;  Surgeon: Justin Henry MD;  [facility]     COMBINED CYSTOSCOPY, RETROGRADES, URETEROSCOPY, INSERT  STENT Bilateral 4/3/2018    Procedure: COMBINED CYSTOSCOPY, RETROGRADES, URETEROSCOPY, INSERT STENT;;  Surgeon: Stuart King MD;  [facility]     COMBINED CYSTOSCOPY, RETROGRADES, URETEROSCOPY, INSERT STENT Bilateral 9/10/2018    Procedure: COMBINED CYSTOSCOPY, RETROGRADES, URETEROSCOPY, INSERT STENT;  Cystoscopy, Bilateral Ureteroscopy, Bladder Biopsies, Retrogram Pyelograms, Ureteral Washings and brushings, cysview;  Surgeon: Stuart King MD;  [facility]     COMBINED CYSTOSCOPY, RETROGRADES, URETEROSCOPY, INSERT STENT Left 8/26/2020    Procedure: Cystoscopy, Left Ureteral Wash, Left ureteral Retrograde Pyelogram;  Surgeon: Justin Henry MD;  [facility]     CYSTOSCOPY, BIOPSY BLADDER INSTILL OPTICAL AGENT N/A 4/3/2018    Procedure: CYSTOSCOPY, BIOPSY BLADDER INSTILL OPTICAL AGENT;  Cystoscopy, Blue Light Cystoscopy, Bladder Biopsies, Bilateral Selective ureteral washings for Cytology, Bilateral Retrograde Pyelograms, Bilateral Ureteroscopy;  Surgeon: Stuart King MD;  [facility]     CYSTOSCOPY, BIOPSY BLADDER, COMBINED N/A 2/19/2018    Procedure: COMBINED CYSTOSCOPY, BIOPSY BLADDER;  Cystoscopy, Bladder Biopsy;  Surgeon: Kenna La MD;  [facility]     CYSTOSCOPY, BIOPSY BLADDER, COMBINED N/A 8/26/2020    Procedure: Cystoscopy, biopsy bladder, combined;  Surgeon: Justin Henry MD;  [facility]     CYSTOSCOPY, FULGURATE BLADDER TUMOR, COMBINED N/A 1/13/2020    Procedure: CYSTOSCOPY, WITH  bladder biopsies and fulguration;  Surgeon: Stuart King MD;  [facility]     CYSTOSCOPY, REMOVE STENT(S), COMBINED  11/13/2018    Procedure: Flexible Cystoscopy with Stent Removal;  Surgeon: Stuart King MD;  [facility]     DAVINCI LYMPHADENECTOMY N/A 11/13/2018    Procedure: Davinci Lymphadenectomy ;  Surgeon: Stuart King MD;  [facility]     DAVINCI NEPHROURETERECTOMY N/A 11/13/2018     Procedure: Right DaVinci Assisted Nephroureterectomy;  Surgeon: Stuart King MD;  [facility]     ENDOSCOPIC ULTRASOUND LOWER GASTROINTESTIONAL TRACT (GI) N/A 10/30/2015    Procedure: ENDOSCOPIC ULTRASOUND LOWER GASTROINTESTIONAL TRACT (GI);  Surgeon: Daniel Jean-Baptiste MD;  [facility]     EYE SURGERY  12/4/17     INSERT PORT VASCULAR ACCESS Right 12/19/2018    Procedure: Chest Port Placement - right;  Surgeon: Stuart Chavez PA-C;  [facility]     IR CHEST PORT PLACEMENT > 5 YRS OF AGE  12/19/2018     IR PORT REMOVAL RIGHT  6/26/2019     LAPAROSCOPIC CHOLECYSTECTOMY WITH CHOLANGIOGRAMS N/A 11/1/2015    Procedure: LAPAROSCOPIC CHOLECYSTECTOMY WITH CHOLANGIOGRAMS;  Surgeon: Tonie Warren MD;  [facility]     REMOVE PORT VASCULAR ACCESS Right 6/26/2019    Procedure: Right Port Removal;  Surgeon: Froilan Irizarry PA-C;  [facility]     SURGICAL HISTORY OF -   1996    malignant melanoma     SURGICAL HISTORY OF -   1968    thyroid nodule     SURGICAL HISTORY OF -       D & C     ZZC NONSPECIFIC PROCEDURE  2005    colonoscopy polyp repeat 2010       ALLERGIES:     Allergies   Allergen Reactions     Codeine        FAMILY HISTORY:  Family History   Problem Relation Age of Onset     Cancer Father         dec - esophageal and laryngeal     Heart Disease Mother      Respiratory Mother         dec     Breast Cancer Daughter      Other Cancer Daughter      Thyroid Disease Daughter      Asthma Daughter      Hyperlipidemia Son      Diabetes Son      Anesthesia Reaction No family hx of      Deep Vein Thrombosis (DVT) No family hx of        SOCIAL HISTORY:  Social History     Tobacco Use     Smoking status: Never Smoker     Smokeless tobacco: Never Used   Substance Use Topics     Alcohol use: No     Alcohol/week: 0.0 standard drinks     Comment: rare     Drug use: No       ROS:  10 point ROS neg other than the symptoms noted above in the HPI.  Head neck: No complaints  of headache or visual auditory or olfactory symptoms  Respiratory: No cough or sputum noted  Cardiovascular: No current complaints.  Atrial fibrillation as noted above.  GI: No issues mentioned  : No complaints  Musculoskeletal: No problems  Mentioned  Cutaneous: Ecchymosis erythema HPI  Endocrine: No complaints  Neurologic: No symptoms  Constitutional: No fever chills or weight change mentioned    Labs:  Reviewed.     Testing/Procedures:  PULMONARY FUNCTION TESTS:   No flowsheet data found.      ECHOCARDIOGRAM:  1/21   Interpretation Summary  Global and regional left ventricular function is normal with an EF of 60-65%.  Grade II or moderate diastolic dysfunction.  The right ventricle is normal size.Global right ventricular function is  normal.  Moderate aortic stenosis is present. Mild aortic insufficiency is present.  This study was compared with the study from 09/11/2018. There has been no  change.        Assessment and Plan:   1.  Atrial fibrillation-now anticoagulated with low-dose rivaroxaban  2.  Complex medical history including bladder malignancy    PLAN  1.  Continue current treatment plan  2. Patient will call about a week before she runs out rivaroxaban.  She prefers her next prescription to me from [facility] mail order    Telephone on 5:30; off 5: 45  Total elapsed time with documentation 20 minutes  Patient at home; clinic [facility] heart  Platform [employer]      I very much appreciated the opportunity to see and assess Dimple Oviedo in the clinic with CV Fellow and resident. Please do not hesitate to contact my office if you have any questions or concerns.      Butch Griffith MD  Cardiac Arrhythmia Service  [facility]  [phone]      I have reviewed the note as documented above.  This accurately captures the substance of my conversation with the patient.  The patient states understanding and is agreeable with the plan.       BUTCH MIMS

## 2021-06-10 NOTE — LETTER
"6/10/2021      RE: Dimple Oviedo  [address]  [facility] [address]       Dear Colleague,    Thank you for the opportunity to participate in the care of your patient, Dimple Oviedo, at the [facility] [place] at [facility]. Please see a copy of my visit note below.    Electrophysiology Clinic Telephone Visit    Dimple Oviedo is a 87 year old female who is being evaluated via a billable telephone visit.      The patient has been notified of following:     \"This telephone visit will be conducted via a call between you and your physician/provider. We have found that certain health care needs can be provided without the need for a physical exam.  This service lets us provide the care you need with a short phone conversation.  If a prescription is necessary we can send it directly to your pharmacy.  If lab work is needed we can place an order for that and you can then stop by our lab to have the test done at a later time.    If during the course of the call the physician/provider feels a telephone visit is not appropriate, you will not be charged for this service.\"   Patient has given verbal consent for Telephone visit?  Yes    HPI:     Mrs. Oviedo is a very pleasant 87-year-old woman who has a complex past medical history including bladder malignancy, kidney removal, and hypertension, GERD and history of atrial fibrillation with rapid rates.  At one point she also had a stress induced cardiomyopathy with diminished ejection fraction but that has resolved.    Patient's daughter was on the phone with Mrs. Oviedo today.    At her last visit we did start rivaroxaban 15 mg daily for anticoagulation.  Today we visited to ascertain whether she was having any bleeding or other drug related issues.  Apparently she has had a few ecchymoses.  One was where her purse was abutting her hip and the others were on the forearm.  She does not seem to be " bothered by these.  We agreed that she would continue the anticoagulant and that we would set up for her to have her prescription refilled with [employer] mail in at a later date when she calls to tell us that she is running low of her medications.    Mrs. Oviedo had no new cardiovascular complaints.  Her daughter seemed pleased with her progress.    PAST MEDICAL HISTORY:  Past Medical History:   Diagnosis Date     Calculus of kidney      Chemotherapy-induced neutropenia (H) 3/6/2019     Esophageal reflux      GERD (gastroesophageal reflux disease)      Hyperlipidemia LDL goal <130 5/9/2010     Malignant melanoma of skin of trunk, except scrotum (H)      Nonspecific abnormal finding     has living will 2004 -      Nontoxic multinodular goiter     no further eval /tx rec per pt     Osteopenia      Other psoriasis      Personal history of colonic polyps      PMR (polymyalgia rheumatica) (H)      Stress-induced cardiomyopathy      Undiagnosed cardiac murmurs      Unspecified constipation      Unspecified essential hypertension      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 12 tablet 3     calcium carbonate 1250 (500 Ca) MG CHEW Take 1 tablet by mouth       calcium carbonate-vitamin D (OS-SHREYA) 500-400 MG-UNIT tablet Take 1 tablet by mouth daily       cycloSPORINE (RESTASIS) 0.05 % ophthalmic emulsion Place 1 drop into both eyes 2 times daily       ferrous sulfate 325 (65 Fe) MG TBEC EC tablet Take 325 mg by mouth every morning        furosemide (LASIX) 20 MG tablet TAKE 1 TABLET (20 MG) BY MOUTH EVERY MORNING 90 tablet 3     hydrOXYzine (ATARAX) 10 MG tablet Take 1 tablet (10 mg) by mouth every 6 hours as  needed for itching or other (adjuvant pain) 10 tablet 0     irbesartan (AVAPRO) 300 MG tablet TAKE 1 TABLET EVERY DAY 90 tablet 2     levofloxacin (LEVAQUIN) 500 MG tablet Take one tablet 6 hours after treatment and one tablet the following morning after treatment. 6 tablet 11     lovastatin (MEVACOR) 40 MG tablet Take 1 tablet (40 mg) by mouth At Bedtime 90 tablet 3     melatonin 5 MG tablet Take 5 mg by mouth At Bedtime       methimazole (TAPAZOLE) 5 MG tablet Take 5 mg alternating with 2.5 mg every other day 90 tablet 3     Omega-3 Fatty Acids (FISH OIL) 500 MG CAPS Take 1 capsule by mouth every morning        omeprazole (PRILOSEC) 20 MG DR capsule TAKE 1 CAPSULE EVERY DAY 90 capsule 1     ondansetron (ZOFRAN-ODT) 4 MG ODT tab Take 1 tablet (4 mg) by mouth every 6 hours as needed for nausea or vomiting 20 tablet 0     oxyCODONE (ROXICODONE) 5 MG tablet Take 1 tablet (5 mg) by mouth every 4 hours as needed for moderate to severe pain 20 tablet 0     polyethylene glycol (MIRALAX) 17 g packet Take 17 g by mouth daily 10 packet 0     Probiotic Product (PROBIOTIC ADVANCED PO) Take 1 capsule by mouth every morning        propranolol ER (INDERAL LA) 60 MG 24 hr capsule Take 1 capsule (60 mg) by mouth daily 90 capsule 2     rivaroxaban ANTICOAGULANT (XARELTO) 15 MG TABS tablet Take 1 tablet (15 mg) by mouth daily (with dinner) 90 tablet 0     rOPINIRole (REQUIP) 0.25 MG tablet TAKE 1 TABLET IN THE AFTERNOON AND TAKE 2 TABLETS EVERY NIGHT 270 tablet 3     senna-docusate (SENOKOT-S/PERICOLACE) 8.6-50 MG tablet Take 1-2 tablets by mouth 2 times daily as needed for constipation 30 tablet 0     sertraline (ZOLOFT) 100 MG tablet TAKE 1 TABLET EVERY MORNING 90 tablet 0     traZODone (DESYREL) 50 MG tablet TAKE 1/2 TABLET AT BEDTIME 45 tablet 2       PAST SURGICAL HISTORY:  Past Surgical History:   Procedure Laterality Date     ARTHROPLASTY KNEE Right 11/9/2020    Procedure: ARTHROPLASTY, KNEE, TOTAL, RIGHT;  Surgeon: Branden  Edward Pradhan MD;  [facility]     BIOPSY       COLONOSCOPY  2014     COMBINED CYSTOSCOPY, INSERT STENT URETER(S) Bilateral 9/12/2018    Procedure: COMBINED CYSTOSCOPY, INSERT STENT URETER(S);  Cystoscopy Bilateral ureteral Stent Placement.;  Surgeon: Justin Henry MD;  [facility]     COMBINED CYSTOSCOPY, RETROGRADES, URETEROSCOPY, INSERT STENT Bilateral 4/3/2018    Procedure: COMBINED CYSTOSCOPY, RETROGRADES, URETEROSCOPY, INSERT STENT;;  Surgeon: Stuart King MD;  [facility]     COMBINED CYSTOSCOPY, RETROGRADES, URETEROSCOPY, INSERT STENT Bilateral 9/10/2018    Procedure: COMBINED CYSTOSCOPY, RETROGRADES, URETEROSCOPY, INSERT STENT;  Cystoscopy, Bilateral Ureteroscopy, Bladder Biopsies, Retrogram Pyelograms, Ureteral Washings and brushings, cysview;  Surgeon: Stuart King MD;  [facility]     COMBINED CYSTOSCOPY, RETROGRADES, URETEROSCOPY, INSERT STENT Left 8/26/2020    Procedure: Cystoscopy, Left Ureteral Wash, Left ureteral Retrograde Pyelogram;  Surgeon: Justin Henry MD;  [facility]     CYSTOSCOPY, BIOPSY BLADDER INSTILL OPTICAL AGENT N/A 4/3/2018    Procedure: CYSTOSCOPY, BIOPSY BLADDER INSTILL OPTICAL AGENT;  Cystoscopy, Blue Light Cystoscopy, Bladder Biopsies, Bilateral Selective ureteral washings for Cytology, Bilateral Retrograde Pyelograms, Bilateral Ureteroscopy;  Surgeon: Stuart King MD;  [facility]     CYSTOSCOPY, BIOPSY BLADDER, COMBINED N/A 2/19/2018    Procedure: COMBINED CYSTOSCOPY, BIOPSY BLADDER;  Cystoscopy, Bladder Biopsy;  Surgeon: Kenna La MD;  [facility]     CYSTOSCOPY, BIOPSY BLADDER, COMBINED N/A 8/26/2020    Procedure: Cystoscopy, biopsy bladder, combined;  Surgeon: Justin Henry MD;  [facility]     CYSTOSCOPY, FULGURATE BLADDER TUMOR, COMBINED N/A 1/13/2020    Procedure: CYSTOSCOPY, WITH  bladder biopsies and fulguration;  Surgeon: Stuart King MD;  [facility]     CYSTOSCOPY, REMOVE STENT(S), COMBINED  11/13/2018    Procedure: Flexible Cystoscopy with Stent Removal;  Surgeon: Stuart King MD;  [facility]     DAVINCI LYMPHADENECTOMY N/A 11/13/2018    Procedure: Davinci Lymphadenectomy ;  Surgeon: Stuart King MD;  [facility]     DAVINCI NEPHROURETERECTOMY N/A 11/13/2018    Procedure: Right DaVinci Assisted Nephroureterectomy;  Surgeon: Stuart King MD;  [facility]     ENDOSCOPIC ULTRASOUND LOWER GASTROINTESTIONAL TRACT (GI) N/A 10/30/2015    Procedure: ENDOSCOPIC ULTRASOUND LOWER GASTROINTESTIONAL TRACT (GI);  Surgeon: Daniel Jean-Baptiste MD;  [facility]     EYE SURGERY  12/4/17     INSERT PORT VASCULAR ACCESS Right 12/19/2018    Procedure: Chest Port Placement - right;  Surgeon: Stuart Chavez PA-C;  [facility]     IR CHEST PORT PLACEMENT > 5 YRS OF AGE  12/19/2018     IR PORT REMOVAL RIGHT  6/26/2019     LAPAROSCOPIC CHOLECYSTECTOMY WITH CHOLANGIOGRAMS N/A 11/1/2015    Procedure: LAPAROSCOPIC CHOLECYSTECTOMY WITH CHOLANGIOGRAMS;  Surgeon: Tonie Warren MD;  [facility]     REMOVE PORT VASCULAR ACCESS Right 6/26/2019    Procedure: Right Port Removal;  Surgeon: Froilan Irizarry PA-C;  [facility]     SURGICAL HISTORY OF -   1996    malignant melanoma     SURGICAL HISTORY OF -   1968    thyroid nodule     SURGICAL HISTORY OF -       D & C     ZC NONSPECIFIC PROCEDURE  2005    colonoscopy polyp repeat 2010       ALLERGIES:     Allergies   Allergen Reactions     Codeine        FAMILY HISTORY:  Family History   Problem Relation Age of Onset     Cancer Father         dec - esophageal and laryngeal     Heart Disease Mother      Respiratory Mother         dec     Breast Cancer Daughter      Other Cancer Daughter      Thyroid Disease Daughter      Asthma Daughter      Hyperlipidemia Son      Diabetes Son      Anesthesia Reaction No family hx of      Deep Vein Thrombosis (DVT)  No family hx of        SOCIAL HISTORY:  Social History     Tobacco Use     Smoking status: Never Smoker     Smokeless tobacco: Never Used   Substance Use Topics     Alcohol use: No     Alcohol/week: 0.0 standard drinks     Comment: rare     Drug use: No       ROS:  10 point ROS neg other than the symptoms noted above in the HPI.  Head neck: No complaints of headache or visual auditory or olfactory symptoms  Respiratory: No cough or sputum noted  Cardiovascular: No current complaints.  Atrial fibrillation as noted above.  GI: No issues mentioned  : No complaints  Musculoskeletal: No problems  Mentioned  Cutaneous: Ecchymosis erythema HPI  Endocrine: No complaints  Neurologic: No symptoms  Constitutional: No fever chills or weight change mentioned    Labs:  Reviewed.     Testing/Procedures:  PULMONARY FUNCTION TESTS:   No flowsheet data found.      ECHOCARDIOGRAM:  1/21   Interpretation Summary  Global and regional left ventricular function is normal with an EF of 60-65%.  Grade II or moderate diastolic dysfunction.  The right ventricle is normal size.Global right ventricular function is  normal.  Moderate aortic stenosis is present. Mild aortic insufficiency is present.  This study was compared with the study from 09/11/2018. There has been no  change.        Assessment and Plan:   1.  Atrial fibrillation-now anticoagulated with low-dose rivaroxaban  2.  Complex medical history including bladder malignancy    PLAN  1.  Continue current treatment plan  2. Patient will call about a week before she runs out rivaroxaban.  She prefers her next prescription to me from [employer] mail order    Telephone on 5:30; off 5: 45  Total elapsed time with documentation 20 minutes  Patient at home; clinic [facility] heart  Platform [employer]      I very much appreciated the opportunity to see and assess Dimple Oviedo in the clinic with CV Fellow and resident. Please do not hesitate to contact my office if you have any questions or concerns.       Butch Griffith MD  Cardiac Arrhythmia Service  [facility]  [phone]      I have reviewed the note as documented above.  This accurately captures the substance of my conversation with the patient.  The patient states understanding and is agreeable with the plan.

## 2021-06-19 NOTE — TELEPHONE ENCOUNTER
I hope this letter finds that you are doing well.  It has been brought to my attention that   you were unable  to keep an appointment with me today    When you miss appointments, you inconvenience me and other patients.  More importantly, you endanger your health.   Your condition requires continued medical care, and your well-being requires  that you actively participate in your treatment plan. I need you to be engaged in this process, or I cannot, in good conscience, have you  as a patient.  If there is some way we can help you get to your appointments as scheduled, please let us know.   Otherwise, please call us soon as possible to reschedule - or let us know that you would like to transfer your care to another physician.  I am concerned about your health and am anxious to see you. Please do come in for your next appointment.  If I do not hear from you in the next 2 weeks, you will be given 30 days of medication and terminated from the clinic.            Sincerely,  Marimar Cruz MSN, CNL, PMHNP         Reason for Call: Request for an order or referral:    Order or referral being requested: Lab order for possible UTI    Date needed: as soon as possible    Has the patient been seen by the PCP for this problem? Not Applicable    Additional comments: The writer advised that patient that an office visit would be needed but the patient was quite argumentative and stated that she was just in last week and to just order the lab. Please follow up with the patient.    Phone number Patient can be reached at:  Cell number on file:    Telephone Information:   Mobile [phone]       Best Time:      Can we leave a detailed message on this number?  YES    Call taken on 3/22/2018 at 9:58 AM by Mandi Spencer

## 2021-06-25 DIAGNOSIS — I48.0 PAROXYSMAL ATRIAL FIBRILLATION (H): ICD-10-CM

## 2021-06-28 NOTE — CONFIDENTIAL NOTE
rivaroxaban ANTICOAGULANT (XARELTO) 15 MG TABS tablet  Last Written Prescription Date: 4/26/21  Last Fill Quantity: 90,   # refills: 0  Last Office Visit : 6/10/21  Future Office visit:  None    4/26/21  Wt  170  5/11/21  Creat  1. 04  Creat clearance  45.505      Routing refill request to provider for review/approval because:  Creat clearance  45.505

## 2021-07-05 ENCOUNTER — OFFICE VISIT (OUTPATIENT)
Dept: FAMILY MEDICINE | Facility: CLINIC | Age: 86
End: 2021-07-05
Payer: MEDICARE

## 2021-07-05 VITALS
SYSTOLIC BLOOD PRESSURE: 176 MMHG | RESPIRATION RATE: 16 BRPM | HEART RATE: 58 BPM | DIASTOLIC BLOOD PRESSURE: 76 MMHG | OXYGEN SATURATION: 98 %

## 2021-07-05 DIAGNOSIS — E04.2 NONTOXIC MULTINODULAR GOITER: ICD-10-CM

## 2021-07-05 DIAGNOSIS — I10 HYPERTENSION GOAL BP (BLOOD PRESSURE) < 140/90: ICD-10-CM

## 2021-07-05 DIAGNOSIS — K62.5 BRBPR (BRIGHT RED BLOOD PER RECTUM): ICD-10-CM

## 2021-07-05 DIAGNOSIS — E05.00 GRAVES DISEASE: ICD-10-CM

## 2021-07-05 DIAGNOSIS — M25.532 LEFT WRIST PAIN: Primary | ICD-10-CM

## 2021-07-05 PROBLEM — G25.81 RESTLESS LEGS SYNDROME: Status: RESOLVED | Noted: 2018-07-11 | Resolved: 2021-07-05

## 2021-07-05 PROCEDURE — 84439 ASSAY OF FREE THYROXINE: CPT | Performed by: INTERNAL MEDICINE

## 2021-07-05 PROCEDURE — 84443 ASSAY THYROID STIM HORMONE: CPT | Performed by: INTERNAL MEDICINE

## 2021-07-05 PROCEDURE — 99214 OFFICE O/P EST MOD 30 MIN: CPT | Performed by: FAMILY MEDICINE

## 2021-07-05 PROCEDURE — 36415 COLL VENOUS BLD VENIPUNCTURE: CPT | Performed by: INTERNAL MEDICINE

## 2021-07-05 NOTE — PROGRESS NOTES
"    Assessment & Plan     Left wrist pain  Tendinosis versus other etiology.  She has some referred pain.  Does not have very typical symptoms of carpal tunnel but cannot be ruled out.  We discussed about hand therapy versus seeing sports medicine.  She is not interested in surgical intervention.  Reasonable to start with medical sports medicine and see what they suggest.  - Orthopedic  Referral; Future    BRBPR (bright red blood per rectum)  With some history of constipation.  History of iron use.  Discussed for her age bright red per rectum can be a sign of a serious cause.  She thinks it is a minor thing and is intermittent thing.  She wishes to monitor it for now.  Discussed to use stool softener consistently for now.  She agreed.    Hypertension goal BP (blood pressure) < 140/90  Blood pressure above goal.  She admits her anxiety is high right now and that is the cause of her high blood pressure.  Repeat blood pressure was even higher than first time.  She is largely asymptomatic but history of NSTEMI.  Better blood pressure control as needed.  Recommended her to have her blood pressure recheck in couple of months before we adjust her blood pressure medication.    Nontoxic multinodular goiter  Ordered by endocrine.  - T4 free  - TSH    Graves disease  Ordered by endocrine.  - T4 free  - TSH             BMI:   Estimated body mass index is 36.47 kg/m  as calculated from the following:    Height as of 4/26/21: 1.454 m (4' 9.25\").    Weight as of 4/26/21: 77.1 kg (170 lb).           No follow-ups on file.    Pop Zapien MD, MD  [facility]    Davey Musa is a 88 year old who presents for the following health issues     HPI     - Left wrist - carpal tunnel - tingling of arm and feels numbs - all fingers. Feels nerve are pinched at shoulder.   Started a few weeks ago. Was using books and ipad during pandemic made it worse. Right handed. May be weak in the " morning - sometime stiffness.   History of PMR - mostly hip pain and lower body pain.     Cardiologist wanted to have follow up.     No weight check but feels her weight is slightly high due to lymphadema.     Grave disease - seeing endocrine. Needs labs.     No blood in urine. Seeing oncologist.    Recently some blood in stool. No black stool - using iron pills and some dark stool due to that. Having bright red blood. Constipation on and off. Not using stool softner. Feels she can monitor.     Review of Systems         Objective    BP (!) 176/76   Pulse 58   Resp 16   SpO2 98%   There is no height or weight on file to calculate BMI.  Physical Exam

## 2021-07-06 LAB
T4 FREE SERPL-MCNC: 0.79 NG/DL (ref 0.76–1.46)
TSH SERPL DL<=0.005 MIU/L-ACNC: 1.68 MU/L (ref 0.4–4)

## 2021-07-06 NOTE — TELEPHONE ENCOUNTER
DIAGNOSIS: Left wrist pain /Dr Zapien/ no images   APPOINTMENT DATE: 7.12.21   NOTES STATUS DETAILS   OFFICE NOTE from referring provider Internal 7.5.21 Dr Pop Zapien, [facility]   OFFICE NOTE from other specialist N/A    DISCHARGE SUMMARY from hospital N/A    DISCHARGE REPORT from the ER N/A    OPERATIVE REPORT N/A    EMG report N/A    MEDICATION LIST Internal    MRI N/A    DEXA (osteoporosis/bone health) N/A    CT SCAN N/A    XRAYS (IMAGES & REPORTS) N/A

## 2021-07-10 DIAGNOSIS — M25.532 LEFT WRIST PAIN: Primary | ICD-10-CM

## 2021-07-12 ENCOUNTER — OFFICE VISIT (OUTPATIENT)
Dept: ORTHOPEDICS | Facility: CLINIC | Age: 86
End: 2021-07-12
Payer: MEDICARE

## 2021-07-12 ENCOUNTER — ANCILLARY PROCEDURE (OUTPATIENT)
Dept: GENERAL RADIOLOGY | Facility: CLINIC | Age: 86
End: 2021-07-12
Attending: FAMILY MEDICINE
Payer: MEDICARE

## 2021-07-12 ENCOUNTER — PRE VISIT (OUTPATIENT)
Dept: ORTHOPEDICS | Facility: CLINIC | Age: 86
End: 2021-07-12

## 2021-07-12 DIAGNOSIS — R20.2 PARESTHESIAS IN LEFT HAND: ICD-10-CM

## 2021-07-12 DIAGNOSIS — M25.832 MASS OF JOINT OF LEFT WRIST: Primary | ICD-10-CM

## 2021-07-12 DIAGNOSIS — M25.532 LEFT WRIST PAIN: ICD-10-CM

## 2021-07-12 PROCEDURE — 99204 OFFICE O/P NEW MOD 45 MIN: CPT | Performed by: FAMILY MEDICINE

## 2021-07-12 PROCEDURE — 73110 X-RAY EXAM OF WRIST: CPT | Mod: LT | Performed by: RADIOLOGY

## 2021-07-12 NOTE — PROGRESS NOTES
[facility]  SPORTS & ORTHOPEDIC CLINIC VISIT     Jul 12, 2021        ASSESSMENT & PLAN        88-year-old with left dorsal wrist mass that does not seem clinically consistent with ganglion cyst.  Enlarging per patient.  Possibly lipomatous.  She additionally is experiencing symptoms in the volar aspect of the hand more recently that seems consistent with median nerve compression in the carpal tunnel though it is not clear that these 2 pathologies are related    Reviewed imaging and assessment with patient in detail  Due to the atypical appearance of the mass as well as her new paresthesias and her report of the enlarging mass we will pursue an MRI for further evaluation.  She was additionally given a wrist splint for bracing her wrist at night and as needed during the day in efforts to reduce her hand symptoms.  She will follow-up after the MRI to discuss results.  May consider carpal tunnel injection at that time if indicated.    Nikunj Jeff MD  [facility] SPORTS MEDICINE [facility]    -----  Chief Complaint   Patient presents with     Left Wrist - Pain       SUBJECTIVE  Dimple Oviedo is a/an 88 year old female who is seen in consultation at the request of Dr. Curry HERRERA for evaluation of  Left wrist pain.     The patient is seen by themselves.  The patient is Right handed    Onset: 2 month(s) ago.  Patient reports having had a localized swelling over the dorsal wrist for a number of years.   it is not painful just gotten bigger over the last couple of months.  She additionally has begun having tingling and numbness in the hand in the thumb index and middle finger.    Location of Pain: Cyst on dorsal wrist.  Numbness on thumb index and middle finger both volar and dorsal  Worsened by: Holding a book, using it   Better with: Tylenol   Treatments tried: Tylenol  Associated symptoms: swelling, numbness and tingling    Orthopedic/Surgical history: NO  Social  History/Occupation: Retired       REVIEW OF SYSTEMS:    Do you have fever, chills, weight loss? No    Do you have any vision problems? No    Do you have any chest pain or edema? No    Do you have any shortness of breath or wheezing?  No    Do you have stomach problems? No    Do you have any numbness or focal weakness? No    Do you have diabetes? No    Do you have problems with bleeding or clotting? No    Do you have an rashes or other skin lesions? No    OBJECTIVE:  There were no vitals taken for this visit.     General: Alert, pleasant, no distress  Left wrist: warm, well perfused, patient reports decreased sensation to light touch over thumb, index, middle finger volarly     Inspection: Localized soft tissue mass over the dorsal wrist that is poorly defined.  Soft, immobile.  Nontender.  No erythema, warmth, fluctuance     ROM: ROM of the wrist is symmetric without significant pain.     Strength: Strength intact in the wrist and hand     Palpation: There is no TTP over the dorsal aspect of the wrist.     Special Tests: Negative Tinel, negative Phalen    RADIOLOGY:    3 view xrays of left wrist performed and reviewed independently demonstrating severe first cmc djd. No acute fx. See EMR for formal radiology report.

## 2021-07-12 NOTE — LETTER
7/12/2021      RE: Dimple Oviedo  [address]  [facility] [address]         [facility]  SPORTS & ORTHOPEDIC CLINIC VISIT     Jul 12, 2021        ASSESSMENT & PLAN        88-year-old with left dorsal wrist mass that does not seem clinically consistent with ganglion cyst.  Enlarging per patient.  Possibly lipomatous.  She additionally is experiencing symptoms in the volar aspect of the hand more recently that seems consistent with median nerve compression in the carpal tunnel though it is not clear that these 2 pathologies are related    Reviewed imaging and assessment with patient in detail  Due to the atypical appearance of the mass as well as her new paresthesias and her report of the enlarging mass we will pursue an MRI for further evaluation.  She was additionally given a wrist splint for bracing her wrist at night and as needed during the day in efforts to reduce her hand symptoms.  She will follow-up after the MRI to discuss results.  May consider carpal tunnel injection at that time if indicated.    Nikunj Jeff MD  [facility] SPORTS MEDICINE CLINIC [place]    -----  Chief Complaint   Patient presents with     Left Wrist - Pain       SUBJECTIVE  Dimple Oviedo is a/an 88 year old female who is seen in consultation at the request of Dr. Curry HERRERA for evaluation of  Left wrist pain.     The patient is seen by themselves.  The patient is Right handed    Onset: 2 month(s) ago.  Patient reports having had a localized swelling over the dorsal wrist for a number of years.   it is not painful just gotten bigger over the last couple of months.  She additionally has begun having tingling and numbness in the hand in the thumb index and middle finger.    Location of Pain: Cyst on dorsal wrist.  Numbness on thumb index and middle finger both volar and dorsal  Worsened by: Holding a book, using it   Better with: Tylenol   Treatments tried: Tylenol  Associated  symptoms: swelling, numbness and tingling    Orthopedic/Surgical history: NO  Social History/Occupation: Retired       REVIEW OF SYSTEMS:    Do you have fever, chills, weight loss? No    Do you have any vision problems? No    Do you have any chest pain or edema? No    Do you have any shortness of breath or wheezing?  No    Do you have stomach problems? No    Do you have any numbness or focal weakness? No    Do you have diabetes? No    Do you have problems with bleeding or clotting? No    Do you have an rashes or other skin lesions? No    OBJECTIVE:  There were no vitals taken for this visit.     General: Alert, pleasant, no distress  Left wrist: warm, well perfused, patient reports decreased sensation to light touch over thumb, index, middle finger volarly     Inspection: Localized soft tissue mass over the dorsal wrist that is poorly defined.  Soft, immobile.  Nontender.  No erythema, warmth, fluctuance     ROM: ROM of the wrist is symmetric without significant pain.     Strength: Strength intact in the wrist and hand     Palpation: There is no TTP over the dorsal aspect of the wrist.     Special Tests: Negative Tinel, negative Phalen    RADIOLOGY:    3 view xrays of left wrist performed and reviewed independently demonstrating severe first cmc djd. No acute fx. See EMR for formal radiology report.         Nikunj Jeff MD

## 2021-07-29 ENCOUNTER — PRE VISIT (OUTPATIENT)
Dept: UROLOGY | Facility: CLINIC | Age: 86
End: 2021-07-29

## 2021-07-29 NOTE — TELEPHONE ENCOUNTER
Reason for visit: cystoscopy     Relevant information: evaluate BCG, neoplasm of bladder    Records/imaging/labs/orders: in epic    Pt called: no    At Rooming: cysto

## 2021-07-30 ENCOUNTER — TELEPHONE (OUTPATIENT)
Dept: UROLOGY | Facility: CLINIC | Age: 86
End: 2021-07-30

## 2021-07-30 NOTE — TELEPHONE ENCOUNTER
Contacted patient and advised her that she should follow up with her primary care provider regarding blood in stool.  Mily Thomason LPN  Urology Clinic Service   [facility]

## 2021-07-30 NOTE — TELEPHONE ENCOUNTER
[facility] Call Center    Phone Message    May a detailed message be left on voicemail: yes     Reason for Call: Other: Pt would like a call back as she has blood in her stool as it has been happening off and on for a month, Please reach out to pt to discuss     Action Taken: Message routed to:  [facility] ([facility] Uro    Travel Screening: Not Applicable

## 2021-08-05 ENCOUNTER — ANCILLARY PROCEDURE (OUTPATIENT)
Dept: MRI IMAGING | Facility: CLINIC | Age: 86
End: 2021-08-05
Attending: FAMILY MEDICINE
Payer: MEDICARE

## 2021-08-05 DIAGNOSIS — M25.532 LEFT WRIST PAIN: ICD-10-CM

## 2021-08-05 PROCEDURE — 73221 MRI JOINT UPR EXTREM W/O DYE: CPT | Mod: LT | Performed by: RADIOLOGY

## 2021-08-05 PROCEDURE — G1004 CDSM NDSC: HCPCS | Performed by: RADIOLOGY

## 2021-08-07 NOTE — PATIENT INSTRUCTIONS
"STOP YOUR BABY ASPIRIN FOR 1 WEEK.    Please schedule a telephone visit with Dr. King in one week to go over your lab results.      Please schedule a cystoscopy with Dr. King in 3 months.      It was a pleasure meeting with you today.  Thank you for allowing me and my team the privilege of caring for you today.  YOU are the reason we are here, and I truly hope we provided you with the excellent service you deserve.  Please let us know if there is anything else we can do for you so that we can be sure you are leaving completely satisfied with your care experience.          AFTER YOUR CYSTOSCOPY        You have just completed a cystoscopy, or \"cysto\", which allowed your physician to learn more about your bladder (or to remove a stent placed after surgery). We suggest that you continue to avoid caffeine, fruit juice, and alcohol for the next 24 hours, however, you are encouraged to return to your normal activities.         A few things that are considered normal after your cystoscopy:     * Small amount of bleeding (or spotting) that clears within the next 24 hours     * Slight burning sensation with urination     * Sensation to of needing to avoid more frequently     * The feeling of \"air\" in your urine     * Mild discomfort that is relieved with Tylenol        Please contact our office promptly if you:     * Develop a fever above 101 degrees     * Are unable to urinate     * Develop bright red blood that does not stop     * Severe pain or swelling         Please contact our office with any concerns or questions [facility].  " External [place]/External [facility]

## 2021-08-09 ENCOUNTER — OFFICE VISIT (OUTPATIENT)
Dept: ORTHOPEDICS | Facility: CLINIC | Age: 86
End: 2021-08-09
Payer: MEDICARE

## 2021-08-09 DIAGNOSIS — M25.532 LEFT WRIST PAIN: Primary | ICD-10-CM

## 2021-08-09 PROCEDURE — 99213 OFFICE O/P EST LOW 20 MIN: CPT | Performed by: FAMILY MEDICINE

## 2021-08-09 RX ORDER — PREDNISONE 20 MG/1
40 TABLET ORAL DAILY
Qty: 10 TABLET | Refills: 0 | Status: SHIPPED | OUTPATIENT
Start: 2021-08-09 | End: 2021-08-14

## 2021-08-09 NOTE — LETTER
8/9/2021      RE: Dimple Oviedo  [address]  [facility] [address]         [facility]  SPORTS & ORTHOPEDIC CLINIC VISIT     Aug 9, 2021        ASSESSMENT & PLAN    88-year-old with soft tissue lesion of the left wrist that is likely benign subcutaneous fatty tissue dorsally.  However, clinical symptoms and MRI suggests carpal tunnel syndrome.  It is not clear based on symptoms today that the ganglion cyst is symptomatic.    Reviewed imaging and assessment with patient in detail  Discussed options for treatment with the patient in detail.  She will continue using her splint.  She would like to pursue a course of oral steroid for 5 days.  Prescription was given.  If this is ineffective may consider ultrasound-guided steroid injection for carpal tunnel given the flexor tenosynovitis seen on MRI.  She will contact us if she would like to pursue this option.    Nikunj Jeff MD  [facility] SPORTS MEDICINE [facility]    -----  Chief Complaint   Patient presents with     Left Wrist - Follow Up       SUBJECTIVE  Dimple Oviedo is a/an 88 year old female who is seen for follow up of L wrist pain and MRI.     The patient is seen by themselves.    Date of injury: ~5/2021  Date of Last Visit: 7/12/21   Symptoms: worsened.  Seems more prominent in the volar aspect of the thumb index and middle finger.  Occasional symptoms in the ring and small finger.  Worsened by: unsure, will get more frequent sharp pains  Better with: nothing  Treatment to date: rest/activity avoidance and casting/splinting/bracing, voltaren gel  Associated symptoms: + numbness and tingling        REVIEW OF SYSTEMS:    See HPI     OBJECTIVE:  There were no vitals taken for this visit.     Left wrist: Again seen is the indiscrete soft tissue swelling over the dorsal wrist which is nontender.  Over the volar wrist and hand she has symptoms with Tinel's as well as passive wrist extension.  Strength is  intact.    RADIOLOGY:    MRI of the left wrist dated 8/5/2021.  Per radiology report:  IMPRESSION:     1. Prominence of dorsal subcutaneous fat without focal circumscribed  fatty lesion.     2. Tenosynovitis of flexor tendons within the carpal tunnel.  Significant flattening of the median nerve, correlate with clinical  symptoms of median neuropathy.     3. Ganglion cyst volar to the distal ulna measuring up to 1.5 cm.     4. Significant first carpal metacarpal joint degenerative change.        Nikunj Jeff MD

## 2021-08-09 NOTE — PROGRESS NOTES
[facility] AND SURGERY CENTER  SPORTS & ORTHOPEDIC CLINIC VISIT     Aug 9, 2021        ASSESSMENT & PLAN    88-year-old with soft tissue lesion of the left wrist that is likely benign subcutaneous fatty tissue dorsally.  However, clinical symptoms and MRI suggests carpal tunnel syndrome.  It is not clear based on symptoms today that the ganglion cyst is symptomatic.    Reviewed imaging and assessment with patient in detail  Discussed options for treatment with the patient in detail.  She will continue using her splint.  She would like to pursue a course of oral steroid for 5 days.  Prescription was given.  If this is ineffective may consider ultrasound-guided steroid injection for carpal tunnel given the flexor tenosynovitis seen on MRI.  She will contact us if she would like to pursue this option.    Nikunj Jeff MD  [facility] SPORTS MEDICINE CLINIC [place]    -----  Chief Complaint   Patient presents with     Left Wrist - Follow Up       SUBJECTIVE  Dimple Oviedo is a/an 88 year old female who is seen for follow up of L wrist pain and MRI.     The patient is seen by themselves.    Date of injury: ~5/2021  Date of Last Visit: 7/12/21   Symptoms: worsened.  Seems more prominent in the volar aspect of the thumb index and middle finger.  Occasional symptoms in the ring and small finger.  Worsened by: unsure, will get more frequent sharp pains  Better with: nothing  Treatment to date: rest/activity avoidance and casting/splinting/bracing, voltaren gel  Associated symptoms: + numbness and tingling        REVIEW OF SYSTEMS:    See HPI     OBJECTIVE:  There were no vitals taken for this visit.     Left wrist: Again seen is the indiscrete soft tissue swelling over the dorsal wrist which is nontender.  Over the volar wrist and hand she has symptoms with Tinel's as well as passive wrist extension.  Strength is intact.    RADIOLOGY:    MRI of the left wrist dated 8/5/2021.  Per radiology  report:  IMPRESSION:     1. Prominence of dorsal subcutaneous fat without focal circumscribed  fatty lesion.     2. Tenosynovitis of flexor tendons within the carpal tunnel.  Significant flattening of the median nerve, correlate with clinical  symptoms of median neuropathy.     3. Ganglion cyst volar to the distal ulna measuring up to 1.5 cm.     4. Significant first carpal metacarpal joint degenerative change.

## 2021-08-11 ENCOUNTER — LAB (OUTPATIENT)
Dept: LAB | Facility: CLINIC | Age: 86
End: 2021-08-11
Attending: INTERNAL MEDICINE
Payer: MEDICARE

## 2021-08-11 ENCOUNTER — ANCILLARY PROCEDURE (OUTPATIENT)
Dept: CT IMAGING | Facility: CLINIC | Age: 86
End: 2021-08-11
Attending: INTERNAL MEDICINE
Payer: MEDICARE

## 2021-08-11 DIAGNOSIS — C66.1 UROTHELIAL CARCINOMA OF RIGHT DISTAL URETER (H): ICD-10-CM

## 2021-08-11 LAB
ALBUMIN SERPL-MCNC: 3.5 G/DL (ref 3.4–5)
ALP SERPL-CCNC: 88 U/L (ref 40–150)
ALT SERPL W P-5'-P-CCNC: 17 U/L (ref 0–50)
ANION GAP SERPL CALCULATED.3IONS-SCNC: 7 MMOL/L (ref 3–14)
AST SERPL W P-5'-P-CCNC: 14 U/L (ref 0–45)
BASOPHILS # BLD AUTO: 0 10E3/UL (ref 0–0.2)
BASOPHILS NFR BLD AUTO: 0 %
BILIRUB SERPL-MCNC: 0.4 MG/DL (ref 0.2–1.3)
BUN SERPL-MCNC: 20 MG/DL (ref 7–30)
CALCIUM SERPL-MCNC: 9.2 MG/DL (ref 8.5–10.1)
CHLORIDE BLD-SCNC: 102 MMOL/L (ref 94–109)
CO2 SERPL-SCNC: 25 MMOL/L (ref 20–32)
CREAT BLD-MCNC: 1 MG/DL (ref 0.5–1)
CREAT SERPL-MCNC: 0.92 MG/DL (ref 0.52–1.04)
EOSINOPHIL # BLD AUTO: 0 10E3/UL (ref 0–0.7)
EOSINOPHIL NFR BLD AUTO: 0 %
ERYTHROCYTE [DISTWIDTH] IN BLOOD BY AUTOMATED COUNT: 12.3 % (ref 10–15)
GFR SERPL CREATININE-BSD FRML MDRD: 50 ML/MIN/1.73M2
GFR SERPL CREATININE-BSD FRML MDRD: 56 ML/MIN/1.73M2
GLUCOSE BLD-MCNC: 111 MG/DL (ref 70–99)
HCT VFR BLD AUTO: 35.4 % (ref 35–47)
HGB BLD-MCNC: 11.4 G/DL (ref 11.7–15.7)
IMM GRANULOCYTES # BLD: 0.1 10E3/UL
IMM GRANULOCYTES NFR BLD: 1 %
LDH SERPL L TO P-CCNC: 176 U/L (ref 81–234)
LYMPHOCYTES # BLD AUTO: 0.6 10E3/UL (ref 0.8–5.3)
LYMPHOCYTES NFR BLD AUTO: 7 %
MCH RBC QN AUTO: 31.4 PG (ref 26.5–33)
MCHC RBC AUTO-ENTMCNC: 32.2 G/DL (ref 31.5–36.5)
MCV RBC AUTO: 98 FL (ref 78–100)
MONOCYTES # BLD AUTO: 0.3 10E3/UL (ref 0–1.3)
MONOCYTES NFR BLD AUTO: 3 %
NEUTROPHILS # BLD AUTO: 8.4 10E3/UL (ref 1.6–8.3)
NEUTROPHILS NFR BLD AUTO: 89 %
NRBC # BLD AUTO: 0 10E3/UL
NRBC BLD AUTO-RTO: 0 /100
PLATELET # BLD AUTO: 213 10E3/UL (ref 150–450)
POTASSIUM BLD-SCNC: 4 MMOL/L (ref 3.4–5.3)
PROT SERPL-MCNC: 7.4 G/DL (ref 6.8–8.8)
RBC # BLD AUTO: 3.63 10E6/UL (ref 3.8–5.2)
SODIUM SERPL-SCNC: 134 MMOL/L (ref 133–144)
WBC # BLD AUTO: 9.5 10E3/UL (ref 4–11)

## 2021-08-11 PROCEDURE — 80053 COMPREHEN METABOLIC PANEL: CPT | Performed by: PATHOLOGY

## 2021-08-11 PROCEDURE — G1004 CDSM NDSC: HCPCS | Performed by: RADIOLOGY

## 2021-08-11 PROCEDURE — 71260 CT THORAX DX C+: CPT | Mod: MG | Performed by: RADIOLOGY

## 2021-08-11 PROCEDURE — 83615 LACTATE (LD) (LDH) ENZYME: CPT

## 2021-08-11 PROCEDURE — 36415 COLL VENOUS BLD VENIPUNCTURE: CPT

## 2021-08-11 PROCEDURE — 80053 COMPREHEN METABOLIC PANEL: CPT

## 2021-08-11 PROCEDURE — 999N000127 HC STATISTIC PERIPHERAL IV START W US GUIDANCE

## 2021-08-11 PROCEDURE — 85025 COMPLETE CBC W/AUTO DIFF WBC: CPT

## 2021-08-11 PROCEDURE — 74178 CT ABD&PLV WO CNTR FLWD CNTR: CPT | Mod: MG | Performed by: RADIOLOGY

## 2021-08-11 RX ORDER — IOPAMIDOL 755 MG/ML
104 INJECTION, SOLUTION INTRAVASCULAR ONCE
Status: COMPLETED | OUTPATIENT
Start: 2021-08-11 | End: 2021-08-11

## 2021-08-11 RX ADMIN — IOPAMIDOL 104 ML: 755 INJECTION, SOLUTION INTRAVASCULAR at 11:00

## 2021-08-12 ENCOUNTER — OFFICE VISIT (OUTPATIENT)
Dept: FAMILY MEDICINE | Facility: CLINIC | Age: 86
End: 2021-08-12
Payer: MEDICARE

## 2021-08-12 ENCOUNTER — TELEPHONE (OUTPATIENT)
Dept: GASTROENTEROLOGY | Facility: OUTPATIENT CENTER | Age: 86
End: 2021-08-12

## 2021-08-12 VITALS
HEART RATE: 62 BPM | BODY MASS INDEX: 36.9 KG/M2 | WEIGHT: 172 LBS | OXYGEN SATURATION: 97 % | RESPIRATION RATE: 14 BRPM | SYSTOLIC BLOOD PRESSURE: 184 MMHG | DIASTOLIC BLOOD PRESSURE: 78 MMHG | TEMPERATURE: 97.9 F

## 2021-08-12 DIAGNOSIS — Z11.59 ENCOUNTER FOR SCREENING FOR OTHER VIRAL DISEASES: ICD-10-CM

## 2021-08-12 DIAGNOSIS — I10 HYPERTENSION GOAL BP (BLOOD PRESSURE) < 140/90: ICD-10-CM

## 2021-08-12 DIAGNOSIS — K62.5 BRBPR (BRIGHT RED BLOOD PER RECTUM): Primary | ICD-10-CM

## 2021-08-12 PROCEDURE — 99214 OFFICE O/P EST MOD 30 MIN: CPT | Performed by: FAMILY MEDICINE

## 2021-08-12 NOTE — PROGRESS NOTES
Assessment & Plan     BRBPR (bright red blood per rectum)  -Ongoing for over 6 weeks now, episodes becoming more frequent. Pt is on Xarelto.  -Given mutual concern for more serious underlying pathology such as malignancy will send for colonoscopy  -Hemoglobin 11.4 yesterday, stable  -Advised immediate evaluation in ED if passing clots, persistent bleeding, dizziness, chest pain, sob.  - Adult Gastro Ref - Procedure Only    Hypertension goal BP (blood pressure) < 140/90  -Likely acutely elevated due to current steroid use  -Will have patient follow-up with PCP for further monitoring      30 minutes spent on the date of the encounter doing chart review, history and exam, documentation and further activities per the note.       Shine Pulido DO  [facility]    Davey Musa is a 88 year old who presents for the following health issues:    HPI     Patient with history of HTN, CAD, HF, afib on xarelto,  bladder cancer. Here for ongoing episodes of blood in stool. States symptoms initially began about 7 weeks ago. Saw PCP on 7/5 and only had intermittent symptoms then and she choose to continue to monitor her symptoms. States has had more frequent episodes of blood in stool. States BRB covering stool, does not change the color of water. No blood with wiping or pain with BM. Denies passing clots, abdominal pain, nausea, dizziness, fever, chills. Had CBC check by oncology yesterday and hemoglobin was 11.4. Patient would like colonoscopy for further work up.      BP readings elevated today. Patient has been on oral steroids for wrist pain.     Concern - Blood in stool.   Onset: x 5 weeks   Description: seen doctor Hailey on 07/05/2021 for same symptoms   Intensity: mild  Progression of Symptoms:  same  Accompanying Signs & Symptoms: none   Previous history of similar problem: none   Therapies tried and outcome: None    Review of Systems   CONSTITUTIONAL: NEGATIVE for fever, chills, change  in weight  RESP: NEGATIVE for significant cough or SOB  CV: NEGATIVE for chest pain, palpitations or peripheral edema  NEURO: NEGATIVE for weakness, dizziness or paresthesias      Objective    BP (!) 184/78 (BP Location: Right arm, Patient Position: Sitting, Cuff Size: Adult Regular)   Pulse 62   Temp 97.9  F (36.6  C) (Oral)   Resp 14   Wt 78 kg (172 lb)   SpO2 97%   BMI 36.90 kg/m    Body mass index is 36.9 kg/m .  Physical Exam   GENERAL: healthy, alert and no distress  RESP: lungs clear to auscultation - no rales, rhonchi or wheezes  CV: regular rate and rhythm, normal S1 S2, no S3 or S4, no murmur, click or rub, no peripheral edema and peripheral pulses strong  ABDOMEN: soft, nontender, no hepatosplenomegaly, no masses and bowel sounds normal  MS: no gross musculoskeletal defects noted, no edema  SKIN: no suspicious lesions or rashes  NEURO: Normal strength and tone, mentation intact and speech normal

## 2021-08-12 NOTE — TELEPHONE ENCOUNTER
Screening Questions  1. Are you active on mychart? yes    2. What insurance is in the chart? [facility] medicare    2.  Ordering/Referring Provider: Shine Pulido DO     3. BMI 36.47    4. Are you on daily home oxygen? no    5. Do you have a history of difficult airway? no    6. Have you had a heart, lung, or liver transplant? no    7. Are you currently on dialysis? no    8. Have you had a stroke or Transient ischemic atttack (TIA) within 6 months? no    9. In the past 6 months, have you had any heart related issues including cardiomyopathy or heart attack?         If yes, did it require cardiac stenting or other implantable device?no    10. Do you have any implantable devices in your body (pacemaker, defib, LVAD)? no    11. Do you take nitroglycerin? If yes, how often? no    12. Are you currently taking any blood thinners?exeltra    13. Are you a diabetic? no    14. (Females) Are you currently pregnant?   If yes, how many weeks?    15. Have you had a procedure in the past that was difficult to tolerate with conscious sedation? Any allergies to Fentanyl or Versed no    16. Are you taking any scheduled prescription narcotics more than once daily? no    17. Do you have any chemical dependencies such as alcohol, street drugs, or methadone? no    18. Do you have any history of post-traumatic stress syndrome or mental health issues? no    19. Do you transfer independently? yes    20.  Do you have any issues with constipation? no    21. Preferred Pharmacy for Pre Prescription in chart    Scheduling Details    Which Colonoscopy Prep was Sent?: miralax  Procedure Scheduled: colonoscopy  Provider/Surgeon: leventhal  Date of Procedure: 09/01/21  Location: [facility]  Caller (Please ask for phone number if not scheduled by patient): harley horner      Sedation Type: cs  Conscious Sedation- Needs  for 6 hours after the procedure  MAC/General-Needs  for 24 hours after procedure    Pre-op Required at [facility], [place] and OR for MAC sedation:   (if yes advise patient they will need a pre-op prior to procedure)      Is patient on blood thinners? -no (If yes- inform patient to follow up with PCP or provider for follow up instructions)     Informed patient they will need an adult  yes  Cannot take any type of public or medical transportation alone    Informed Patient of COVID Test Requirement yes    Confirmed Nurse will call to complete assessment yes    Additional comments:

## 2021-08-12 NOTE — PATIENT INSTRUCTIONS
Patient Education     When You Have Gastrointestinal (GI) Bleeding  Blood in your vomit or stool can be a sign of gastrointestinal (GI) bleeding. GI bleeding can be scary. But the cause may not be serious. You should always see a doctor if you have GI bleeding.   The GI tract is the path through which food travels in the body. Food passes from the mouth down the esophagus. This is the tube from the mouth to the stomach. Food starts to break down in the stomach. It then moves through the duodenum , the first part of the small intestine . Nutrients are absorbed as food travels through the small intestine. What is left passes into the colon (large intestine) as waste. The colon removes water from the waste. Waste continues from the colon to the rectum (where stool is stored). Waste then leaves the body through the anus . The upper GI tract is from the mouth through the duodenum. The lower GI tract is from the end of the duodenum to the anus.     Causes of GI bleeding  GI bleeding can be caused by many different problems. Some of the more common causes include:     Swollen veins in the anus (hemorrhoids)    Swollen veins in the esophagus (varices)    Sore on the lining of the GI tract (ulcer)    Cuts or scrapes in the mouth or throat    Infection caused by germs such as bacteria or parasites    Food allergies, such as milk allergy in young children    Medicines, especially aspirin, blood thinners, and NSAIDs (non-steroidal anti-inflammatory drugs) such as ibuprofen    Inflammation of the GI tract (gastritis or esophagitis)    Colitis (Crohn's disease or ulcerative colitis)    Cancer (tumors or polyps)    Abnormal pouches in the colon (diverticula)    Tears in the esophagus or anus    Nosebleed    Abnormal blood vessels in the GI tract (angiodysplasia)  Diagnosing the cause of blood in stool   If blood is coming out in your stool, you may have a lower GI tract problem or a very fast upper GI tract bleed. Bleeding from  the GI tract can be bright red. Or it may look dark and tarry. Tests may also find blood in your stool that can t be seen with the eye (occult blood). To find out the cause, tests that may be ordered include:     Blood tests. A blood sample is taken and sent to a lab for exam.    Hemoccult test. Checks a stool sample for blood.    Stool culture. Checks a stool sample for bacteria or parasites.    X-ray, ultrasound, nuclear scan, or CT scan. Imaging tests that take pictures of the digestive tract.    Colonoscopy or sigmoidoscopy. This test uses a flexible tube with a tiny camera. The tube is inserted through your anus into your rectum to see the inside of your colon. Your provider can also take a tiny tissue sample (biopsy) and treat a bleeding source    Capsule endoscopy. This test uses a tiny camera that is swallowed, passes through the intestine, and takes pictures of the small intestine that is more difficult to reach with scopes.  Diagnosing the cause of blood in vomit   If you are vomiting blood or something that looks like coffee grounds, you may have an upper GI tract problem. To find the cause, tests that may be done include:     Upper endoscopy. A flexible tube with a tiny camera is inserted through your mouth and throat to see inside your upper GI tract. This lets your provider take a tiny tissue sample (biopsy) and treat a bleeding source.    Nasogastric lavage. The healthcare provider may withdraw some of the fluid in the stomach to test it for bleeding. This can sometimes tell if you have upper GI or lower GI bleeding.    X-ray, ultrasound, nuclear scan, or CT scan. Imaging tests that take pictures of your digestive tract.    Upper GI series. X-rays of the upper part of your GI tract taken after swallowing a contrast drink. .    Enteroscopy. This sends a flexible tube or a small, swallowed capsule camera into your small intestine.  When to call your healthcare provider  Call your healthcare provider  right away if you have any of the following:     Fever of 100.4  F ( 38.0 ) or higher    Signs of fluid loss (dehydration). These include a dry, sticky mouth, decreased urine output, and very dark urine.    Belly (abdominal) pain  Call 911  Call 911, or get medical care right away  if any of the following occur:     Bleeding from your mouth or anus that can't be stopped    Bleeding along with feeling lightheaded or dizzy  [employer] last reviewed this educational content on 6/1/2019 2000-2021 [employer]. All rights reserved. This information is not intended as a substitute for professional medical care. Always follow your healthcare professional's instructions.

## 2021-08-13 ENCOUNTER — OFFICE VISIT (OUTPATIENT)
Dept: UROLOGY | Facility: CLINIC | Age: 86
End: 2021-08-13
Payer: MEDICARE

## 2021-08-13 ENCOUNTER — MYC MEDICAL ADVICE (OUTPATIENT)
Dept: CARDIOLOGY | Facility: CLINIC | Age: 86
End: 2021-08-13

## 2021-08-13 VITALS
BODY MASS INDEX: 36.24 KG/M2 | DIASTOLIC BLOOD PRESSURE: 76 MMHG | HEART RATE: 61 BPM | WEIGHT: 168 LBS | HEIGHT: 57 IN | SYSTOLIC BLOOD PRESSURE: 180 MMHG

## 2021-08-13 DIAGNOSIS — C67.8 MALIGNANT NEOPLASM OF OVERLAPPING SITES OF BLADDER (H): Primary | ICD-10-CM

## 2021-08-13 PROCEDURE — 52000 CYSTOURETHROSCOPY: CPT | Performed by: UROLOGY

## 2021-08-13 RX ORDER — LIDOCAINE HYDROCHLORIDE 20 MG/ML
JELLY TOPICAL ONCE
Status: ACTIVE | OUTPATIENT
Start: 2021-08-13

## 2021-08-13 ASSESSMENT — PAIN SCALES - GENERAL: PAINLEVEL: NO PAIN (0)

## 2021-08-13 ASSESSMENT — MIFFLIN-ST. JEOR: SCORE: 1065.92

## 2021-08-13 NOTE — NURSING NOTE
"Chief Complaint   Patient presents with     Cystoscopy       Blood pressure (!) 180/76, pulse 61, height 1.448 m (4' 9\"), weight 76.2 kg (168 lb), not currently breastfeeding. Body mass index is 36.35 kg/m .    Patient Active Problem List   Diagnosis     Esophageal reflux     Restless leg syndrome     heat intolerance     Goiter     Disorder of bone and cartilage     Other psoriasis     perirectal cyst     Malignant melanoma of skin of trunk, except scrotum (H)     Nontoxic multinodular goiter     Hyperlipidemia LDL goal <130     Hypertension goal BP (blood pressure) < 140/90     Urinary incontinence     Hip arthritis     Polymyalgia rheumatica (H)     High risk medication use     Shoulder pain     Impaired fasting blood sugar     Chronic bilateral low back pain without sciatica     Obesity, unspecified obesity severity, unspecified obesity type     Iron deficiency anemia, unspecified iron deficiency anemia type     NSTEMI (non-ST elevated myocardial infarction) (H)     Stress-induced cardiomyopathy     A-fib (H)     Anxiety     Chronic systolic heart failure (H)     Urothelial carcinoma (H)     Hyperthyroidism     CRESENCIO (acute kidney injury) (H)     Hydronephrosis     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)     Graves disease     Encounter for antineoplastic chemotherapy     Chemotherapy-induced neutropenia (H)     Primary localized osteoarthritis of right knee     Urothelial cancer (H)     Malignant neoplasm of overlapping sites of bladder (H)     Primary osteoarthritis of right knee     Chronic kidney disease, stage 3     Lipedematous scalp       Allergies   Allergen Reactions     Codeine        Current Outpatient Medications   Medication Sig Dispense Refill     acetaminophen (TYLENOL) 325 MG tablet Take 3 tablets (975 mg) by mouth every 8 hours as needed for pain 1 Bottle 0     alendronate (FOSAMAX) 70 MG tablet TAKE 1 TABLET EVERY 7 DAYS AT LEAST 60 MINUTES BEFORE BREAKFAST AS DIRECTED. 12 tablet 3     " calcium carbonate 1250 (500 Ca) MG CHEW Take 1 tablet by mouth       calcium carbonate-vitamin D (OS-SHREYA) 500-400 MG-UNIT tablet Take 1 tablet by mouth daily       cycloSPORINE (RESTASIS) 0.05 % ophthalmic emulsion Place 1 drop into both eyes 2 times daily       ferrous sulfate 325 (65 Fe) MG TBEC EC tablet Take 325 mg by mouth every morning        furosemide (LASIX) 20 MG tablet TAKE 1 TABLET (20 MG) BY MOUTH EVERY MORNING 90 tablet 3     hydrOXYzine (ATARAX) 10 MG tablet Take 1 tablet (10 mg) by mouth every 6 hours as needed for itching or other (adjuvant pain) 10 tablet 0     irbesartan (AVAPRO) 300 MG tablet TAKE 1 TABLET EVERY DAY 90 tablet 2     levofloxacin (LEVAQUIN) 500 MG tablet Take one tablet 6 hours after treatment and one tablet the following morning after treatment. 6 tablet 11     lovastatin (MEVACOR) 40 MG tablet Take 1 tablet (40 mg) by mouth At Bedtime 90 tablet 3     melatonin 5 MG tablet Take 5 mg by mouth At Bedtime       methimazole (TAPAZOLE) 5 MG tablet Take 5 mg alternating with 2.5 mg every other day 90 tablet 3     Omega-3 Fatty Acids (FISH OIL) 500 MG CAPS Take 1 capsule by mouth every morning        omeprazole (PRILOSEC) 20 MG DR capsule TAKE 1 CAPSULE EVERY DAY 90 capsule 1     ondansetron (ZOFRAN-ODT) 4 MG ODT tab Take 1 tablet (4 mg) by mouth every 6 hours as needed for nausea or vomiting 20 tablet 0     oxyCODONE (ROXICODONE) 5 MG tablet Take 1 tablet (5 mg) by mouth every 4 hours as needed for moderate to severe pain 20 tablet 0     polyethylene glycol (MIRALAX) 17 g packet Take 17 g by mouth daily 10 packet 0     predniSONE (DELTASONE) 20 MG tablet Take 2 tablets (40 mg) by mouth daily for 5 days 10 tablet 0     Probiotic Product (PROBIOTIC ADVANCED PO) Take 1 capsule by mouth every morning        propranolol ER (INDERAL LA) 60 MG 24 hr capsule Take 1 capsule (60 mg) by mouth daily 90 capsule 2     rivaroxaban ANTICOAGULANT (XARELTO) 15 MG TABS tablet Take 1 tablet (15 mg) by  mouth daily (with dinner) 90 tablet 3     rOPINIRole (REQUIP) 0.25 MG tablet TAKE 1 TABLET IN THE AFTERNOON AND TAKE 2 TABLETS EVERY NIGHT 270 tablet 3     senna-docusate (SENOKOT-S/PERICOLACE) 8.6-50 MG tablet Take 1-2 tablets by mouth 2 times daily as needed for constipation 30 tablet 0     sertraline (ZOLOFT) 100 MG tablet TAKE 1 TABLET EVERY MORNING 90 tablet 0     traZODone (DESYREL) 50 MG tablet TAKE 1/2 TABLET AT BEDTIME 45 tablet 2       Social History     Tobacco Use     Smoking status: Never Smoker     Smokeless tobacco: Never Used   Substance Use Topics     Alcohol use: No     Alcohol/week: 0.0 standard drinks     Comment: rare     Drug use: No       Invasive Procedure Safety Checklist:    Procedure: Cystoscopy    Action: Complete sections and checkboxes as appropriate.    Pre-procedure:  1. Patient ID Verified with 2 identifiers (Bettie and  or MRN) : YES    2. Procedure and site verified with patient/designee (when able) : YES    3. Accurate consent documentation in medical record : YES    4. H&P (or appropriate assessment) documented in medical record : N/A  H&P must be up to 30 days prior to procedure an updated within 24 hours of                 Procedure as applicable.     5. Relevant diagnostic and radiology test results appropriately labeled and displayed as applicable : YES    6. Blood products, implants, devices, and/or special equipment available for the procedure as applicable : YES    7. Procedure site(s) marked with provider initials [Exclusions: none] : NO    8. Marking not required. Reason : Yes  Procedure does not require site marking    Time Out:     Time-Out performed immediately prior to starting procedure, including verbal and active participation of all team members addressing: YES    1. Correct patient identity.  2. Confirmed that the correct side and site are marked.  3. An accurate procedure to be done.  4. Agreement on the procedure to be done.  5. Correct patient position.  6.  Relevant images and results are properly labeled and appropriately displayed.  7. The need to administer antibiotics or fluids for irrigation purposes during the procedure as applicable.  8. Safety precautions based on patient history or medication use.    During Procedure: Verification of correct person, site, and procedure occurs any time the responsibility for care of the patient is transferred to another member of the care team.    The following medication was given:     MEDICATION:  Lidocaine without epinephrine 2% jelly  ROUTE: urethral   SITE: urethral   DOSE: 10 mL  LOT #: [id number]  : [employer]  EXPIRATION DATE: 3/23  NDC#: [address]-3   Was there drug waste? No    Prior to med admin, verified patient identity using patient's name and date of birth.  Due to med administration, patient instructed to remain in clinic for 15 minutes  afterwards, and to report any adverse reaction to me immediately.    Drug Amount Wasted:  None.  Vial/Syringe: SAIMA Reed  8/13/2021  10:17 AM

## 2021-08-13 NOTE — PATIENT INSTRUCTIONS
Please schedule a cystoscopy appointment with Dr. Henry in 3 months. (11/12/2021 8:40 AM)    Dr. Henry's nurse will contact you about BCG treatments.     It was a pleasure meeting with you today.  Thank you for allowing me and my team the privilege of caring for you today.  YOU are the reason we are here, and I truly hope we provided you with the excellent service you deserve.  Please let us know if there is anything else we can do for you so that we can be sure you are leaving completely satisfied with your care experience.

## 2021-08-13 NOTE — LETTER
8/13/2021       RE: Dimple Oviedo  [address]  [facility] [address]     Dear Colleague,    Thank you for referring your patient, Dimple Oviedo, to the [facility] [place] [facility]. Please see a copy of my visit note below.    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 She completed chemotherapy (gem/carbo).  Stable to no evidence of disease in the upper tracts.     2. 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 Got 6 of BCG and tolerated it well. Negative biopsies 4/2020, 8/26/20.       Plan for 3 weekly maintenance BCG treatments at 3, 6, 12, 18, 24, 30, and 36 months.  Now at 9 months from 4/2020 2nd round of BCG.     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 The patient is following wit Dr. Toledo of Hematology and Oncology.      Per Dr. Toledo's note, on 12/12/18 Mr. Oviedo- Started adjuvant chemotherapy (carbo+gem) per POUT trial. Cycle 2 - 1/2/19. Cycle 3 - 1/23/19. Cycle 4 - 02/13/19.    Any recurrence? cystoscopy on 10/03/2019 showed small area of erythema. Bladder biopsy from 01/13/2020 showed urothelial carcinoma in-situ , negative biopsy 5/2020    Intravesical Therapy: BCG X6 Feb 2020 (Full Strength)     She had right total knee arthoplasty 02/17/2020    Dr Fine is doing follow-up as well.    Today she is doing well.      CYSTOSCOPY PROCEDURE:  Sterile preparation and drape and an  informed consent were performed.  A 16-German flexible cystoscope was introduced via the urethra.  The urethra was open.  The bladder mucosa showed no evidence of any lesions or trabeculation.  There were no stones.    Again, thank you for allowing me to participate in the care of your patient.      Sincerely,    Justin Henry MD

## 2021-08-13 NOTE — PROGRESS NOTES
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 She completed chemotherapy (gem/carbo).  Stable to no evidence of disease in the upper tracts.     2. 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 Got 6 of BCG and tolerated it well. Negative biopsies 4/2020, 8/26/20.       Plan for 3 weekly maintenance BCG treatments at 3, 6, 12, 18, 24, 30, and 36 months.  Now at 9 months from 4/2020 2nd round of BCG.     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 The patient is following yolanda Toledo of Hematology and Oncology.      Per Dr. Toledo's note, on 12/12/18 Mr. Oviedo- Started adjuvant chemotherapy (carbo+gem) per POUT trial. Cycle 2 - 1/2/19. Cycle 3 - 1/23/19. Cycle 4 - 02/13/19.    Any recurrence? cystoscopy on 10/03/2019 showed small area of erythema. Bladder biopsy from 01/13/2020 showed urothelial carcinoma in-situ , negative biopsy 5/2020    Intravesical Therapy: BCG X6 Feb 2020 (Full Strength)     She had right total knee arthoplasty 02/17/2020    Dr Fine is doing follow-up as well.    Today she is doing well.      CYSTOSCOPY PROCEDURE:  Sterile preparation and drape and an informed consent were performed.  A 16-Jordanian flexible cystoscope was introduced via the urethra.  The urethra was open.  The bladder mucosa showed no evidence of any lesions or trabeculation.  There were no stones.

## 2021-08-17 ENCOUNTER — ONCOLOGY VISIT (OUTPATIENT)
Dept: ONCOLOGY | Facility: CLINIC | Age: 86
End: 2021-08-17
Attending: INTERNAL MEDICINE
Payer: MEDICARE

## 2021-08-17 VITALS
HEART RATE: 66 BPM | WEIGHT: 170.4 LBS | TEMPERATURE: 99.1 F | SYSTOLIC BLOOD PRESSURE: 158 MMHG | RESPIRATION RATE: 20 BRPM | BODY MASS INDEX: 36.87 KG/M2 | DIASTOLIC BLOOD PRESSURE: 73 MMHG | OXYGEN SATURATION: 96 %

## 2021-08-17 DIAGNOSIS — C66.1 UROTHELIAL CARCINOMA OF RIGHT DISTAL URETER (H): Primary | ICD-10-CM

## 2021-08-17 DIAGNOSIS — E05.00 GRAVES DISEASE: ICD-10-CM

## 2021-08-17 PROCEDURE — G0463 HOSPITAL OUTPT CLINIC VISIT: HCPCS

## 2021-08-17 PROCEDURE — 88120 CYTP URNE 3-5 PROBES EA SPEC: CPT | Mod: TC | Performed by: INTERNAL MEDICINE

## 2021-08-17 PROCEDURE — 99214 OFFICE O/P EST MOD 30 MIN: CPT | Performed by: INTERNAL MEDICINE

## 2021-08-17 PROCEDURE — G0463 HOSPITAL OUTPT CLINIC VISIT: HCPCS | Mod: 25

## 2021-08-17 PROCEDURE — 88112 CYTOPATH CELL ENHANCE TECH: CPT | Mod: TC | Performed by: INTERNAL MEDICINE

## 2021-08-17 ASSESSMENT — PAIN SCALES - GENERAL: PAINLEVEL: NO PAIN (0)

## 2021-08-17 NOTE — NURSING NOTE
"Oncology Rooming Note    August 17, 2021 11:58 AM   Dimple Oviedo is a 88 year old female who presents for:    Chief Complaint   Patient presents with     Oncology Clinic Visit     Malignant neoplasm of bladder      Initial Vitals: BP (!) 158/73 (BP Location: Right arm, Patient Position: Sitting, Cuff Size: Adult Large)   Pulse 66   Temp 99.1  F (37.3  C) (Oral)   Resp 20   Wt 77.3 kg (170 lb 6.4 oz)   SpO2 96%   BMI 36.87 kg/m   Estimated body mass index is 36.87 kg/m  as calculated from the following:    Height as of 8/13/21: 1.448 m (4' 9\").    Weight as of this encounter: 77.3 kg (170 lb 6.4 oz). Body surface area is 1.76 meters squared.  No Pain (0) Comment: Data Unavailable   No LMP recorded. Patient is postmenopausal.  Allergies reviewed: Yes  Medications reviewed: Yes    Medications: Medication refills not needed today.  Pharmacy name entered into [employer]:    [facility] - [place] [address] [id number] - [place], [place] - [address] AT [facility] & [address] [place] [address] [facility] STORE #[id number] - [place], [place] - [address] AT [facility] [address]    Clinical concerns: Curious about the Covid booster shot.       Eugenia Vang LPN              "

## 2021-08-17 NOTE — PROGRESS NOTES
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    HISTORY OF PRESENT ILLNESS:  Ms. 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 Her oncologic history is as under.    Ms. Oviedo is doing well. She denies any complains suggestive of recurrent disease. She has carpal tunnel symptoms in her left hand and is wearing a brace. She has numbness in the thumb, index and middle finger of her left hand.      ONCOLOGIC HISTORY:  1. 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   - Jan 2018 to September 2018- Persistent gross hematuria and underwent multiple procedures.    - 2/19/18 and 4/3/18- cystoscopies with bladder biopsies  which were negative for bladder tumor  - 1/17/18, 3/8/18, 7/26/18, 9/10/18- Multiple urine cytologies have come back positive by FISH for high-grade transitional cell carcinoma  - 9/10/18- Underwent a bilateral cystoureteroscopy with biopsy and brushing that showed  right upper tract cytology suspicious for high-grade urothelial ca. Right ureter brushing negative from that day. Left upper tract negative.  - 9/10/18- CT A/P without contrast showed new small bilateral pleural effusions and interstitial pulmonary edema but no evidence of locoregional or metastatic disease.  - 9/12/18- Presented to ED with oliguria, CRESENCIO, and mild hydronephrosis bilaterally on CT scan and underwent bilateral ureteral stent placment  - 11/13/2018- Right robotic nephroureterectomy, excision of sandip-caval mass and LN excision by Stuart King. Pathology showed \"Right nephroureterectomy: Invasive high grade urothelial carcinoma measuring 3.5 cm, " "located in renal pelvis and invading through renal parenchyma into perinephric fat. Urothelial carcinoma in-situ present. Margins free of tumor. Ana-caval mass: Metastatic urothelial carcinoma involving one lymph node with at least 1 cm tumor deposit. Pericaval Extranodal Extension present. Five additional benign pericaval lymph nodes. Pathologic stage: pT4N1 (1 of 6 lymph nodes).\"  - 12/11/18- PET/CT whole body demonstrated significant residual FDG avid disease. For example, \"there is an FDG avid ill-defined pericaval mass immediately medial to the surgical clips measuring approximately 2.0 x 1.4 cm, with max SUV measuring up to12.2, see series 4 image 21. Additional FDG avid retrocaval and interaortocaval lymphadenopathy are noted.There is a 1.2 cm nodule in the right hemipelvis posterior lateral tothe bladder with max SUV measuring 8.3, see series 4 image 77. The bladder is incompletely distended.\" No suspicious thoracic or bone uptake.  - 12/12/18- Started adjuvant chemotherapy (carbo+gem) per POUT trial. Cycle 2 - 1/2/19. Cycle 3 - 1/23/19. Cycle 4 - 02/13/19.  - 1/22/19 - CT C/A/P with contrast compared with prior PET/CT: \"1. New well-circumscribed soft tissue pulmonary nodules of the right lower lobe and left upper lobe. Findings could be infectious, inflammatory, or represent metastatic disease. Continued attention on follow-up recommended. Postoperative changes of right nephrectomy. No evidence for local recurrence in the present study.\"  - 3/1/2019- CT C/A/P with contrast - \"1. Bilateral pulmonary nodules measuring up to 4 mm in the right lower lobe, previously measuring 8 mm. No new or enlarging pulmonary nodules. 2. No convincing evidence for metastatic disease in the abdomen, pelvis and bones.\"  - 6/3/2019- CT C/A/P with contrast - \"1. Surgical changes of right nephrectomy without evidence of residual or recurrent disease on this noncontrast exam.  Consider contrast enhanced examination on follow-up. " "2. No evidence of metastatic disease in the abdomen, or pelvis on noncontrast CT.  Scattered tiny pulmonary nodules in the chest are not significantly changed.  Previously described prominent right lower lobe pulmonary nodule (previously measuring up to 8 mm) is no longer visualized. 3. Stable bilateral pulmonary nodules measuring maximally 4 mm.\"  - 09/11/19: CT C/A/P without contrast - \"1. Stable exam without evidence convincing for new disease. 2. Stable pulmonary nodules. 3. 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 4. Stable heterogeneous enlargement of the thyroid with underlying nodules suggested.\"  - 12/4/19: CT C/A/P without contrast - \"No acute change since prior exam. No evidence of recurrent or metastatic disease. Tiny lung nodules are stable. Prior right nephrectomy. Left renal artery aneurysm is stable.\"  - 04/08/20, 7/27/20: CT C/A/P with contrast - GABRIELLE.  - 01/12/21: CT C/A/P with contrast - \"1.  Slight increased size of a 6 mm left upper lobe nodule and new 4 mm linear opacity along the right major fissure. These are indeterminate but could represent progression of metastatic disease. 2.  Slight increased size of mildly enlarged mediastinal and hilar lymph nodes. 3.  Mild pulmonary edema. 4.  No recurrent or metastatic disease in the abdomen and pelvis. 5.  Mild stranding around the urinary bladder dome may be related to cystitis. Punctate focus of gas within the urinary bladder either secondary to infection or instrumentation. 6.  Enlarged multinodular thyroid gland.\"    2. Non-muscle invasive bladder cancer:  - 10/3/19: Cystoscopy showed a small area of erythema on retroflexion, possibly pre-existing or due to retroflexion of the scope. Urine cytology from that time showed cells suspicious for malignancy.   - 1/13/20: Bladder biopsy showed high grade urothelial carcinoma in-situ  - 2/12/20-3/18/20: Intravesical BCG therapy  - 7/24/20 and last cystoscopy was on the same day. It was " negative. However, urine cytology was positive for high-grade urothelial carcinoma.   - 11/25/20-12/10/2020: 3 weekly doses of intravesical BCG 50mg.   - 12/10/20: Cytology suspicious and FISH urovysion positive for TCC.    REVIEW OF SYSTEMS: 14 point ROS negative other than the symptoms noted above in the HPI.    PAST MEDICAL AND SURGICAL HISTORY:   Past Medical History:   Diagnosis Date     Arthritis      Calculus of kidney      Chemotherapy-induced neutropenia (H) 3/6/2019     Congestive heart failure (H)      Esophageal reflux      GERD (gastroesophageal reflux disease)      Hyperlipidemia LDL goal <130 5/9/2010     Malignant melanoma of skin of trunk, except scrotum (H)      Nonspecific abnormal finding     has living will 2004 -      Nontoxic multinodular goiter     no further eval /tx rec per pt     Osteopenia      Other psoriasis      Personal history of colonic polyps      PMR (polymyalgia rheumatica) (H)      Restless legs syndrome 7/11/2018     Stress-induced cardiomyopathy      Undiagnosed cardiac murmurs      Unspecified constipation      Unspecified essential hypertension      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     Past Surgical History:   Procedure Laterality Date     ARTHROPLASTY KNEE Right 11/9/2020    Procedure: ARTHROPLASTY, KNEE, TOTAL, RIGHT;  Surgeon: Edward Otero MD;  [facility]     BIOPSY       COLONOSCOPY  2014     COMBINED CYSTOSCOPY, INSERT STENT URETER(S) Bilateral 9/12/2018    Procedure: COMBINED CYSTOSCOPY, INSERT STENT URETER(S);  Cystoscopy Bilateral ureteral Stent Placement.;  Surgeon: Justin Henry MD;  [facility]     COMBINED CYSTOSCOPY, RETROGRADES, URETEROSCOPY, INSERT STENT Bilateral 4/3/2018    Procedure: COMBINED CYSTOSCOPY, RETROGRADES, URETEROSCOPY, INSERT STENT;;  Surgeon: Stuart King MD;  [facility]     COMBINED CYSTOSCOPY, RETROGRADES, URETEROSCOPY, INSERT STENT Bilateral 9/10/2018    Procedure: COMBINED CYSTOSCOPY,  RETROGRADES, URETEROSCOPY, INSERT STENT;  Cystoscopy, Bilateral Ureteroscopy, Bladder Biopsies, Retrogram Pyelograms, Ureteral Washings and brushings, cysview;  Surgeon: Stuart King MD;  [facility]     COMBINED CYSTOSCOPY, RETROGRADES, URETEROSCOPY, INSERT STENT Left 8/26/2020    Procedure: Cystoscopy, Left Ureteral Wash, Left ureteral Retrograde Pyelogram;  Surgeon: Justin Henry MD;  [facility]     CYSTOSCOPY, BIOPSY BLADDER INSTILL OPTICAL AGENT N/A 4/3/2018    Procedure: CYSTOSCOPY, BIOPSY BLADDER INSTILL OPTICAL AGENT;  Cystoscopy, Blue Light Cystoscopy, Bladder Biopsies, Bilateral Selective ureteral washings for Cytology, Bilateral Retrograde Pyelograms, Bilateral Ureteroscopy;  Surgeon: Stuart King MD;  [facility]     CYSTOSCOPY, BIOPSY BLADDER, COMBINED N/A 2/19/2018    Procedure: COMBINED CYSTOSCOPY, BIOPSY BLADDER;  Cystoscopy, Bladder Biopsy;  Surgeon: Kenna La MD;  [facility]     CYSTOSCOPY, BIOPSY BLADDER, COMBINED N/A 8/26/2020    Procedure: Cystoscopy, biopsy bladder, combined;  Surgeon: Justin Henry MD;  [facility]     CYSTOSCOPY, FULGURATE BLADDER TUMOR, COMBINED N/A 1/13/2020    Procedure: CYSTOSCOPY, WITH  bladder biopsies and fulguration;  Surgeon: Stuart King MD;  [facility]     CYSTOSCOPY, REMOVE STENT(S), COMBINED  11/13/2018    Procedure: Flexible Cystoscopy with Stent Removal;  Surgeon: Stuart King MD;  [facility]     DAVINCI LYMPHADENECTOMY N/A 11/13/2018    Procedure: Davinci Lymphadenectomy ;  Surgeon: Stuart King MD;  [facility]     DAVINCI NEPHROURETERECTOMY N/A 11/13/2018    Procedure: Right DaVinci Assisted Nephroureterectomy;  Surgeon: Stuart King MD;  [facility]     ENDOSCOPIC ULTRASOUND LOWER GASTROINTESTIONAL TRACT (GI) N/A 10/30/2015    Procedure: ENDOSCOPIC ULTRASOUND LOWER GASTROINTESTIONAL TRACT (GI);  Surgeon: Jerilyn  Daniel Pradhan MD;  [facility]     EYE SURGERY  12/4/17     INSERT PORT VASCULAR ACCESS Right 12/19/2018    Procedure: Chest Port Placement - right;  Surgeon: Stuart Chavez PA-C;  [facility]     IR CHEST PORT PLACEMENT > 5 YRS OF AGE  12/19/2018     IR PORT REMOVAL RIGHT  6/26/2019     LAPAROSCOPIC CHOLECYSTECTOMY WITH CHOLANGIOGRAMS N/A 11/1/2015    Procedure: LAPAROSCOPIC CHOLECYSTECTOMY WITH CHOLANGIOGRAMS;  Surgeon: Tonie Warren MD;  [facility]     REMOVE PORT VASCULAR ACCESS Right 6/26/2019    Procedure: Right Port Removal;  Surgeon: Froilan Irizarry PA-C;  [facility]     SURGICAL HISTORY OF -   1996    malignant melanoma     SURGICAL HISTORY OF -   1968    thyroid nodule     SURGICAL HISTORY OF -       D & C     ZZC NONSPECIFIC PROCEDURE  2005    colonoscopy polyp repeat 2010     SOCIAL HISTORY:   Social History     Tobacco Use     Smoking status: Never Smoker     Smokeless tobacco: Never Used   Substance Use Topics     Alcohol use: No     Alcohol/week: 0.0 standard drinks     Comment: rare     Drug use: No     FAMILY HISTORY:   Family History   Problem Relation Age of Onset     Cancer Father         dec - esophageal and laryngeal     Heart Disease Mother      Respiratory Mother         dec     Breast Cancer Daughter      Other Cancer Daughter      Thyroid Disease Daughter      Asthma Daughter      Hyperlipidemia Son      Diabetes Son      Anesthesia Reaction No family hx of      Deep Vein Thrombosis (DVT) No family hx of      ALLERGIES:   Allergies   Allergen Reactions     Codeine      CURRENT MEDICATIONS:   Current Outpatient Medications:      acetaminophen (TYLENOL) 325 MG tablet, Take 3 tablets (975 mg) by mouth every 8 hours as needed for pain, Disp: 1 Bottle, Rfl: 0     alendronate (FOSAMAX) 70 MG tablet, TAKE 1 TABLET EVERY 7 DAYS AT LEAST 60 MINUTES BEFORE BREAKFAST AS DIRECTED., Disp: 12 tablet, Rfl: 3     calcium carbonate 1250 (500 Ca) MG CHEW, Take 1  tablet by mouth, Disp: , Rfl:      calcium carbonate-vitamin D (OS-SHREYA) 500-400 MG-UNIT tablet, Take 1 tablet by mouth daily, Disp: , Rfl:      cycloSPORINE (RESTASIS) 0.05 % ophthalmic emulsion, Place 1 drop into both eyes 2 times daily, Disp: , Rfl:      ferrous sulfate 325 (65 Fe) MG TBEC EC tablet, Take 325 mg by mouth every morning , Disp: , Rfl:      furosemide (LASIX) 20 MG tablet, TAKE 1 TABLET (20 MG) BY MOUTH EVERY MORNING, Disp: 90 tablet, Rfl: 3     hydrOXYzine (ATARAX) 10 MG tablet, Take 1 tablet (10 mg) by mouth every 6 hours as needed for itching or other (adjuvant pain), Disp: 10 tablet, Rfl: 0     irbesartan (AVAPRO) 300 MG tablet, TAKE 1 TABLET EVERY DAY, Disp: 90 tablet, Rfl: 2     levofloxacin (LEVAQUIN) 500 MG tablet, Take one tablet 6 hours after treatment and one tablet the following morning after treatment., Disp: 6 tablet, Rfl: 11     lovastatin (MEVACOR) 40 MG tablet, Take 1 tablet (40 mg) by mouth At Bedtime, Disp: 90 tablet, Rfl: 3     melatonin 5 MG tablet, Take 5 mg by mouth At Bedtime, Disp: , Rfl:      methimazole (TAPAZOLE) 5 MG tablet, Take 5 mg alternating with 2.5 mg every other day, Disp: 90 tablet, Rfl: 3     Omega-3 Fatty Acids (FISH OIL) 500 MG CAPS, Take 1 capsule by mouth every morning , Disp: , Rfl:      omeprazole (PRILOSEC) 20 MG DR capsule, TAKE 1 CAPSULE EVERY DAY, Disp: 90 capsule, Rfl: 1     ondansetron (ZOFRAN-ODT) 4 MG ODT tab, Take 1 tablet (4 mg) by mouth every 6 hours as needed for nausea or vomiting, Disp: 20 tablet, Rfl: 0     oxyCODONE (ROXICODONE) 5 MG tablet, Take 1 tablet (5 mg) by mouth every 4 hours as needed for moderate to severe pain, Disp: 20 tablet, Rfl: 0     polyethylene glycol (MIRALAX) 17 g packet, Take 17 g by mouth daily, Disp: 10 packet, Rfl: 0     Probiotic Product (PROBIOTIC ADVANCED PO), Take 1 capsule by mouth every morning , Disp: , Rfl:      propranolol ER (INDERAL LA) 60 MG 24 hr capsule, Take 1 capsule (60 mg) by mouth daily, Disp: 90  capsule, Rfl: 2     rivaroxaban ANTICOAGULANT (XARELTO) 15 MG TABS tablet, Take 1 tablet (15 mg) by mouth daily (with dinner), Disp: 90 tablet, Rfl: 3     rOPINIRole (REQUIP) 0.25 MG tablet, TAKE 1 TABLET IN THE AFTERNOON AND TAKE 2 TABLETS EVERY NIGHT, Disp: 270 tablet, Rfl: 3     senna-docusate (SENOKOT-S/PERICOLACE) 8.6-50 MG tablet, Take 1-2 tablets by mouth 2 times daily as needed for constipation, Disp: 30 tablet, Rfl: 0     sertraline (ZOLOFT) 100 MG tablet, TAKE 1 TABLET EVERY MORNING, Disp: 90 tablet, Rfl: 0     traZODone (DESYREL) 50 MG tablet, TAKE 1/2 TABLET AT BEDTIME, Disp: 45 tablet, Rfl: 2    Current Facility-Administered Medications:      lidocaine (XYLOCAINE) 2 % external gel, , Urethral, Once, Justin Henry MD    PHYSICAL EXAMINATION:  Deferred. Phone visit due to COVID.    LABORATORY DATA:   Recent Labs   Lab Test 08/11/21  1059 08/11/21  1031 05/11/21  1007 01/28/21  1340 01/12/21  1230 11/11/20  0617   NA  --  134 138 136 133 134   POTASSIUM  --  4.0 4.2 4.4 5.6* 4.2   CHLORIDE  --  102 101 103 100 105   CO2  --  25 29 26 31 25   ANIONGAP  --  7 8 7 2* 4   BUN  --  20 24 20 22 16   CR 1.0 0.92 1.04 1.02 0.97 1.06*   GLC  --  111* 161* 93 110* 89   SHREYA  --  9.2 9.3 10.1 8.6 7.7*     Recent Labs   Lab Test 02/03/19  2102 12/12/18  1050 01/16/18  1247 01/16/18  0646 12/13/17  2236 04/18/16  0905 11/02/15  0912 11/01/15  0727 10/31/15  0810   MAG 1.8 2.1 2.1 2.0 2.0  --   --  2.0  --    PHOS  --   --   --   --  2.4* 3.2 3.2 2.1* 2.4*     Recent Labs   Lab Test 08/11/21  1031 05/11/21  1007 01/12/21  1230 11/11/20  0617 11/10/20  0654 10/28/20  1244 09/03/20  1213 07/20/20  1828   WBC 9.5 7.1 6.2  --   --  6.4 11.2* 7.1   HGB 11.4* 12.6 11.7 9.9* 10.3* 13.0 13.0 12.9    220 171  --   --  218 224 224   MCV 98 98 100  --   --  98 99 99   NEUTROPHIL 89 72.6 67.6  --   --   --  77.6 64.3     Recent Labs   Lab Test 08/11/21  1031 05/11/21  1007 01/28/21  1340 07/17/18  1346   BILITOTAL 0.4  0.5 0.4  --    ALKPHOS 88 94 86  --    ALT 17 18 21  --    AST 14 10 16  --    ALBUMIN 3.5 3.6 3.9  --      --   --  204     TSH   Date Value Ref Range Status   07/05/2021 1.68 0.40 - 4.00 mU/L Final   05/27/2021 1.93 0.40 - 4.00 mU/L Final   01/27/2021 2.42 0.40 - 4.00 mU/L Final     No results for input(s): CEA in the last 01106 hours.  Results for orders placed or performed in visit on 08/11/21   CT Abdomen wo & w Chest Pelvis w Contrast    Narrative    CT ABDOMEN WO & W CHEST PELVIS W CONTRAST 8/11/2021 11:11 AM    CLINICAL HISTORY: Urothelial cancer from upper tract post nephrectomy;  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)    TECHNIQUE: CT scan of the chest, abdomen, and pelvis was performed  without and following injection of IV contrast. Multiplanar reformats  were obtained. Dose reduction techniques were used. The CT protocol  and contrast administration was prescribed by the radiology department  at the [facility].  CONTRAST: Isovue 370 104cc    COMPARISON: 5/11/2021    FINDINGS:   LUNGS AND PLEURA: Stable small pulmonary nodules. For example a 5 mm  left upper lobe nodule (series 9, image 89) and 3 mm subpleural right  upper lobe nodule (series 9, image 85) are stable. Previously seen  groundglass opacities in the inferior aspect of the right upper lobe  have resolved. Stable mild mosaic attenuation in the lung bases. No  infiltrate or pleural effusion. No new or enlarging nodules. Right  lower lobe calcified granuloma.    MEDIASTINUM/AXILLAE: Enlarged multinodular thyroid gland, stable. No  lymphadenopathy. No thoracic aortic aneurysm. Mild coronary artery  calcifications. No pericardial effusion. Moderate-sized hiatal hernia.    HEPATOBILIARY: Normal liver. Cholecystectomy. Stable mild central  intrahepatic and extrahepatic biliary ductal dilatation secondary to  cholecystectomy reservoir effect.    PANCREAS: Normal.    SPLEEN: Normal.    ADRENAL GLANDS:  Normal.    KIDNEYS/BLADDER: Right nephrectomy. Stable 4 mm nonobstructing left  renal calculus. No suspicious left renal mass or hydronephrosis. No  suspicious urothelial enhancement or filling defect in the left renal  collecting system and left ureter. Partially distended urinary bladder  without focal wall thickening. 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    BOWEL: No obstruction or inflammatory change.    PELVIC ORGANS: No pelvic masses.    ADDITIONAL FINDINGS: Stable borderline enlarged 11 mm right common  iliac lymph node (series 6, image 407). No new or enlarging lymph  nodes in the abdomen and pelvis. No free fluid or extraluminal air.    MUSCULOSKELETAL: No suspicious lesions within the bones. Degenerative  changes in the spine.      Impression    IMPRESSION:  1.  Stable small pulmonary nodules measuring up to 5 mm.  2.  Resolution of mild groundglass opacities in the right upper lobe suggesting resolved infection or inflammation.   3.  No metastatic disease in the abdomen and pelvis.   4.  Stable borderline enlarged right common iliac lymph node measuring  11 mm. Continued attention on follow-up.  5.  Stable enlarged multinodular thyroid gland.  6.  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    TISHA MOYER MD         SYSTEM ID:  [id number]     *Note: Due to a large number of results and/or encounters for the requested time period, some results have not been displayed. A complete set of results can be found in Results Review.         ASSESSMENT AND PLAN: Ms. 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    1. Upper tract urothelial cancer:   - She completed 4 cycles of adjuvant carboplatin plus gemcitabine chemotherapy per POUT trial.    She completed chemotherapy in Feb 2019 over 2 yrs  ago  - No residual side effects or evidence of recurrence.  - Reviewed restaging CT scan from Jan, March, May and now Aug 2021; there is no clear evidence of disease recurrence in abd/pelvis.   She continues to have pulmonary nodules which remain stable.    Lung findings are non-specific and these are also not responsible for her shortness of breath    - We will continue to monitor and her get a repeat CT C/A/P without contrast in 3-4 months.     2. High grade urothelial carcinoma in situ:  - Bx proven carcinoma in situ in 1/2020, completed the first round of intravesical BCG treatment 2/2020-3/2020 and second in 11/2020-12/2020.  - She was initially followed by Dr. King and now is under care of Dr. Froilan Henry.   - She did not have urine cytology done and I will get it after this visit.   - She will continue on 3 weekly BCG every 3 months as part of her maintenance along with follow up cystoscopies    3. Chemo induced anemia and thrombocytopenia:  - Resolved.     4. Persistent HERMAN:  - She follows Dr. Griffith in Cardiology for her h/o moderate AORTIC STENOSIS, CHF, and Afib now with worsening SOA and lymphedema with no cancer-related etiology evident.     Return to clinic in 3-4 months with restaging scans.    All of the above was explained to the patient in lay language and several questions were answered. They are in agreement with the plan.     25 minutes spent on the date of the encounter doing chart review, history and exam, documentation and further activities as noted above     Sd Fine    Hematologist and Medical Oncologist  [facility]

## 2021-08-17 NOTE — LETTER
8/17/2021         RE: Dimple Oviedo  [address]  [facility] [address]        Dear Colleague,    Thank you for referring your patient, Dimple Oviedo, to the [facility]. Please see a copy of my visit note below.    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    HISTORY OF PRESENT ILLNESS:  Ms. 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 Her oncologic history is as under.    Ms. Oviedo is doing well. She denies any complains suggestive of recurrent disease. She has carpal tunnel symptoms in her left hand and is wearing a brace. She has numbness in the thumb, index and middle finger of her left hand.      ONCOLOGIC HISTORY:  1. 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   - Jan 2018 to September 2018- Persistent gross hematuria and underwent multiple procedures.    - 2/19/18 and 4/3/18- cystoscopies with bladder biopsies  which were negative for bladder tumor  - 1/17/18, 3/8/18, 7/26/18, 9/10/18- Multiple urine cytologies have come back positive by FISH for high-grade transitional cell carcinoma  - 9/10/18- Underwent a bilateral cystoureteroscopy with biopsy and brushing that showed  right upper tract cytology suspicious for high-grade urothelial ca. Right ureter brushing negative from that day. Left upper tract negative.  - 9/10/18- CT A/P without contrast showed new small bilateral pleural effusions and interstitial pulmonary edema but no evidence of locoregional or metastatic disease.  - 9/12/18- Presented to ED with oliguria, CRESENCIO, and mild hydronephrosis bilaterally on CT scan and  "underwent bilateral ureteral stent placment  - 11/13/2018- Right robotic nephroureterectomy, excision of sandip-caval mass and LN excision by Stuart King. Pathology showed \"Right nephroureterectomy: Invasive high grade urothelial carcinoma measuring 3.5 cm, located in renal pelvis and invading through renal parenchyma into perinephric fat. Urothelial carcinoma in-situ present. Margins free of tumor. Sandip-caval mass: Metastatic urothelial carcinoma involving one lymph node with at least 1 cm tumor deposit. Pericaval Extranodal Extension present. Five additional benign pericaval lymph nodes. Pathologic stage: pT4N1 (1 of 6 lymph nodes).\"  - 12/11/18- PET/CT whole body demonstrated significant residual FDG avid disease. For example, \"there is an FDG avid ill-defined pericaval mass immediately medial to the surgical clips measuring approximately 2.0 x 1.4 cm, with max SUV measuring up to12.2, see series 4 image 21. Additional FDG avid retrocaval and interaortocaval lymphadenopathy are noted.There is a 1.2 cm nodule in the right hemipelvis posterior lateral tothe bladder with max SUV measuring 8.3, see series 4 image 77. The bladder is incompletely distended.\" No suspicious thoracic or bone uptake.  - 12/12/18- Started adjuvant chemotherapy (carbo+gem) per POUT trial. Cycle 2 - 1/2/19. Cycle 3 - 1/23/19. Cycle 4 - 02/13/19.  - 1/22/19 - CT C/A/P with contrast compared with prior PET/CT: \"1. New well-circumscribed soft tissue pulmonary nodules of the right lower lobe and left upper lobe. Findings could be infectious, inflammatory, or represent metastatic disease. Continued attention on follow-up recommended. Postoperative changes of right nephrectomy. No evidence for local recurrence in the present study.\"  - 3/1/2019- CT C/A/P with contrast - \"1. Bilateral pulmonary nodules measuring up to 4 mm in the right lower lobe, previously measuring 8 mm. No new or enlarging pulmonary nodules. 2. No convincing evidence for " "metastatic disease in the abdomen, pelvis and bones.\"  - 6/3/2019- CT C/A/P with contrast - \"1. Surgical changes of right nephrectomy without evidence of residual or recurrent disease on this noncontrast exam.  Consider contrast enhanced examination on follow-up. 2. No evidence of metastatic disease in the abdomen, or pelvis on noncontrast CT.  Scattered tiny pulmonary nodules in the chest are not significantly changed.  Previously described prominent right lower lobe pulmonary nodule (previously measuring up to 8 mm) is no longer visualized. 3. Stable bilateral pulmonary nodules measuring maximally 4 mm.\"  - 09/11/19: CT C/A/P without contrast - \"1. Stable exam without evidence convincing for new disease. 2. Stable pulmonary nodules. 3. 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 4. Stable heterogeneous enlargement of the thyroid with underlying nodules suggested.\"  - 12/4/19: CT C/A/P without contrast - \"No acute change since prior exam. No evidence of recurrent or metastatic disease. Tiny lung nodules are stable. Prior right nephrectomy. Left renal artery aneurysm is stable.\"  - 04/08/20, 7/27/20: CT C/A/P with contrast - GABRIELLE.  - 01/12/21: CT C/A/P with contrast - \"1.  Slight increased size of a 6 mm left upper lobe nodule and new 4 mm linear opacity along the right major fissure. These are indeterminate but could represent progression of metastatic disease. 2.  Slight increased size of mildly enlarged mediastinal and hilar lymph nodes. 3.  Mild pulmonary edema. 4.  No recurrent or metastatic disease in the abdomen and pelvis. 5.  Mild stranding around the urinary bladder dome may be related to cystitis. Punctate focus of gas within the urinary bladder either secondary to infection or instrumentation. 6.  Enlarged multinodular thyroid gland.\"    2. Non-muscle invasive bladder cancer:  - 10/3/19: Cystoscopy showed a small area of erythema on retroflexion, possibly pre-existing or due to retroflexion " of the scope. Urine cytology from that time showed cells suspicious for malignancy.   - 1/13/20: Bladder biopsy showed high grade urothelial carcinoma in-situ  - 2/12/20-3/18/20: Intravesical BCG therapy  - 7/24/20 and last cystoscopy was on the same day. It was negative. However, urine cytology was positive for high-grade urothelial carcinoma.   - 11/25/20-12/10/2020: 3 weekly doses of intravesical BCG 50mg.   - 12/10/20: Cytology suspicious and FISH urovysion positive for TCC.    REVIEW OF SYSTEMS: 14 point ROS negative other than the symptoms noted above in the HPI.    PAST MEDICAL AND SURGICAL HISTORY:   Past Medical History:   Diagnosis Date     Arthritis      Calculus of kidney      Chemotherapy-induced neutropenia (H) 3/6/2019     Congestive heart failure (H)      Esophageal reflux      GERD (gastroesophageal reflux disease)      Hyperlipidemia LDL goal <130 5/9/2010     Malignant melanoma of skin of trunk, except scrotum (H)      Nonspecific abnormal finding     has living will 2004 -      Nontoxic multinodular goiter     no further eval /tx rec per pt     Osteopenia      Other psoriasis      Personal history of colonic polyps      PMR (polymyalgia rheumatica) (H)      Restless legs syndrome 7/11/2018     Stress-induced cardiomyopathy      Undiagnosed cardiac murmurs      Unspecified constipation      Unspecified essential hypertension      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     Past Surgical History:   Procedure Laterality Date     ARTHROPLASTY KNEE Right 11/9/2020    Procedure: ARTHROPLASTY, KNEE, TOTAL, RIGHT;  Surgeon: Edward Otero MD;  [facility]     BIOPSY       COLONOSCOPY  2014     COMBINED CYSTOSCOPY, INSERT STENT URETER(S) Bilateral 9/12/2018    Procedure: COMBINED CYSTOSCOPY, INSERT STENT URETER(S);  Cystoscopy Bilateral ureteral Stent Placement.;  Surgeon: Justin Henry MD;  [facility]     COMBINED CYSTOSCOPY, RETROGRADES, URETEROSCOPY, INSERT STENT Bilateral 4/3/2018     Procedure: COMBINED CYSTOSCOPY, RETROGRADES, URETEROSCOPY, INSERT STENT;;  Surgeon: Stuart King MD;  [facility]     COMBINED CYSTOSCOPY, RETROGRADES, URETEROSCOPY, INSERT STENT Bilateral 9/10/2018    Procedure: COMBINED CYSTOSCOPY, RETROGRADES, URETEROSCOPY, INSERT STENT;  Cystoscopy, Bilateral Ureteroscopy, Bladder Biopsies, Retrogram Pyelograms, Ureteral Washings and brushings, cysview;  Surgeon: Stuart King MD;  [facility]     COMBINED CYSTOSCOPY, RETROGRADES, URETEROSCOPY, INSERT STENT Left 8/26/2020    Procedure: Cystoscopy, Left Ureteral Wash, Left ureteral Retrograde Pyelogram;  Surgeon: Justin Henry MD;  [facility]     CYSTOSCOPY, BIOPSY BLADDER INSTILL OPTICAL AGENT N/A 4/3/2018    Procedure: CYSTOSCOPY, BIOPSY BLADDER INSTILL OPTICAL AGENT;  Cystoscopy, Blue Light Cystoscopy, Bladder Biopsies, Bilateral Selective ureteral washings for Cytology, Bilateral Retrograde Pyelograms, Bilateral Ureteroscopy;  Surgeon: Stuart King MD;  [facility]     CYSTOSCOPY, BIOPSY BLADDER, COMBINED N/A 2/19/2018    Procedure: COMBINED CYSTOSCOPY, BIOPSY BLADDER;  Cystoscopy, Bladder Biopsy;  Surgeon: Kenna La MD;  [facility]     CYSTOSCOPY, BIOPSY BLADDER, COMBINED N/A 8/26/2020    Procedure: Cystoscopy, biopsy bladder, combined;  Surgeon: Justin Henry MD;  [facility]     CYSTOSCOPY, FULGURATE BLADDER TUMOR, COMBINED N/A 1/13/2020    Procedure: CYSTOSCOPY, WITH  bladder biopsies and fulguration;  Surgeon: Stuart King MD;  [facility]     CYSTOSCOPY, REMOVE STENT(S), COMBINED  11/13/2018    Procedure: Flexible Cystoscopy with Stent Removal;  Surgeon: Stuart King MD;  [facility]     DAVINCI LYMPHADENECTOMY N/A 11/13/2018    Procedure: Davinci Lymphadenectomy ;  Surgeon: Stuart King MD;  [facility]     DAVINCI NEPHROURETERECTOMY N/A 11/13/2018    Procedure: Right  DaVinci Assisted Nephroureterectomy;  Surgeon: Stuart King MD;  [facility]     ENDOSCOPIC ULTRASOUND LOWER GASTROINTESTIONAL TRACT (GI) N/A 10/30/2015    Procedure: ENDOSCOPIC ULTRASOUND LOWER GASTROINTESTIONAL TRACT (GI);  Surgeon: Daniel Jean-Baptiste MD;  [facility]     EYE SURGERY  12/4/17     INSERT PORT VASCULAR ACCESS Right 12/19/2018    Procedure: Chest Port Placement - right;  Surgeon: Stuart Chavez PA-C;  [facility]     IR CHEST PORT PLACEMENT > 5 YRS OF AGE  12/19/2018     IR PORT REMOVAL RIGHT  6/26/2019     LAPAROSCOPIC CHOLECYSTECTOMY WITH CHOLANGIOGRAMS N/A 11/1/2015    Procedure: LAPAROSCOPIC CHOLECYSTECTOMY WITH CHOLANGIOGRAMS;  Surgeon: Tonie Warren MD;  [facility]     REMOVE PORT VASCULAR ACCESS Right 6/26/2019    Procedure: Right Port Removal;  Surgeon: Froilan Irizarry PA-C;  [facility]     SURGICAL HISTORY OF -   1996    malignant melanoma     SURGICAL HISTORY OF -   1968    thyroid nodule     SURGICAL HISTORY OF -       D & C     ZZC NONSPECIFIC PROCEDURE  2005    colonoscopy polyp repeat 2010     SOCIAL HISTORY:   Social History     Tobacco Use     Smoking status: Never Smoker     Smokeless tobacco: Never Used   Substance Use Topics     Alcohol use: No     Alcohol/week: 0.0 standard drinks     Comment: rare     Drug use: No     FAMILY HISTORY:   Family History   Problem Relation Age of Onset     Cancer Father         dec - esophageal and laryngeal     Heart Disease Mother      Respiratory Mother         dec     Breast Cancer Daughter      Other Cancer Daughter      Thyroid Disease Daughter      Asthma Daughter      Hyperlipidemia Son      Diabetes Son      Anesthesia Reaction No family hx of      Deep Vein Thrombosis (DVT) No family hx of      ALLERGIES:   Allergies   Allergen Reactions     Codeine      CURRENT MEDICATIONS:   Current Outpatient Medications:      acetaminophen (TYLENOL) 325 MG tablet, Take 3 tablets (975 mg) by  mouth every 8 hours as needed for pain, Disp: 1 Bottle, Rfl: 0     alendronate (FOSAMAX) 70 MG tablet, TAKE 1 TABLET EVERY 7 DAYS AT LEAST 60 MINUTES BEFORE BREAKFAST AS DIRECTED., Disp: 12 tablet, Rfl: 3     calcium carbonate 1250 (500 Ca) MG CHEW, Take 1 tablet by mouth, Disp: , Rfl:      calcium carbonate-vitamin D (OS-SHREYA) 500-400 MG-UNIT tablet, Take 1 tablet by mouth daily, Disp: , Rfl:      cycloSPORINE (RESTASIS) 0.05 % ophthalmic emulsion, Place 1 drop into both eyes 2 times daily, Disp: , Rfl:      ferrous sulfate 325 (65 Fe) MG TBEC EC tablet, Take 325 mg by mouth every morning , Disp: , Rfl:      furosemide (LASIX) 20 MG tablet, TAKE 1 TABLET (20 MG) BY MOUTH EVERY MORNING, Disp: 90 tablet, Rfl: 3     hydrOXYzine (ATARAX) 10 MG tablet, Take 1 tablet (10 mg) by mouth every 6 hours as needed for itching or other (adjuvant pain), Disp: 10 tablet, Rfl: 0     irbesartan (AVAPRO) 300 MG tablet, TAKE 1 TABLET EVERY DAY, Disp: 90 tablet, Rfl: 2     levofloxacin (LEVAQUIN) 500 MG tablet, Take one tablet 6 hours after treatment and one tablet the following morning after treatment., Disp: 6 tablet, Rfl: 11     lovastatin (MEVACOR) 40 MG tablet, Take 1 tablet (40 mg) by mouth At Bedtime, Disp: 90 tablet, Rfl: 3     melatonin 5 MG tablet, Take 5 mg by mouth At Bedtime, Disp: , Rfl:      methimazole (TAPAZOLE) 5 MG tablet, Take 5 mg alternating with 2.5 mg every other day, Disp: 90 tablet, Rfl: 3     Omega-3 Fatty Acids (FISH OIL) 500 MG CAPS, Take 1 capsule by mouth every morning , Disp: , Rfl:      omeprazole (PRILOSEC) 20 MG DR capsule, TAKE 1 CAPSULE EVERY DAY, Disp: 90 capsule, Rfl: 1     ondansetron (ZOFRAN-ODT) 4 MG ODT tab, Take 1 tablet (4 mg) by mouth every 6 hours as needed for nausea or vomiting, Disp: 20 tablet, Rfl: 0     oxyCODONE (ROXICODONE) 5 MG tablet, Take 1 tablet (5 mg) by mouth every 4 hours as needed for moderate to severe pain, Disp: 20 tablet, Rfl: 0     polyethylene glycol (MIRALAX) 17 g  packet, Take 17 g by mouth daily, Disp: 10 packet, Rfl: 0     Probiotic Product (PROBIOTIC ADVANCED PO), Take 1 capsule by mouth every morning , Disp: , Rfl:      propranolol ER (INDERAL LA) 60 MG 24 hr capsule, Take 1 capsule (60 mg) by mouth daily, Disp: 90 capsule, Rfl: 2     rivaroxaban ANTICOAGULANT (XARELTO) 15 MG TABS tablet, Take 1 tablet (15 mg) by mouth daily (with dinner), Disp: 90 tablet, Rfl: 3     rOPINIRole (REQUIP) 0.25 MG tablet, TAKE 1 TABLET IN THE AFTERNOON AND TAKE 2 TABLETS EVERY NIGHT, Disp: 270 tablet, Rfl: 3     senna-docusate (SENOKOT-S/PERICOLACE) 8.6-50 MG tablet, Take 1-2 tablets by mouth 2 times daily as needed for constipation, Disp: 30 tablet, Rfl: 0     sertraline (ZOLOFT) 100 MG tablet, TAKE 1 TABLET EVERY MORNING, Disp: 90 tablet, Rfl: 0     traZODone (DESYREL) 50 MG tablet, TAKE 1/2 TABLET AT BEDTIME, Disp: 45 tablet, Rfl: 2    Current Facility-Administered Medications:      lidocaine (XYLOCAINE) 2 % external gel, , Urethral, Once, Justin Henry MD    PHYSICAL EXAMINATION:  Deferred. Phone visit due to COVID.    LABORATORY DATA:   Recent Labs   Lab Test 08/11/21  1059 08/11/21  1031 05/11/21  1007 01/28/21  1340 01/12/21  1230 11/11/20  0617   NA  --  134 138 136 133 134   POTASSIUM  --  4.0 4.2 4.4 5.6* 4.2   CHLORIDE  --  102 101 103 100 105   CO2  --  25 29 26 31 25   ANIONGAP  --  7 8 7 2* 4   BUN  --  20 24 20 22 16   CR 1.0 0.92 1.04 1.02 0.97 1.06*   GLC  --  111* 161* 93 110* 89   SHREYA  --  9.2 9.3 10.1 8.6 7.7*     Recent Labs   Lab Test 02/03/19  2102 12/12/18  1050 01/16/18  1247 01/16/18  0646 12/13/17  2236 04/18/16  0905 11/02/15  0912 11/01/15  0727 10/31/15  0810   MAG 1.8 2.1 2.1 2.0 2.0  --   --  2.0  --    PHOS  --   --   --   --  2.4* 3.2 3.2 2.1* 2.4*     Recent Labs   Lab Test 08/11/21  1031 05/11/21  1007 01/12/21  1230 11/11/20  0617 11/10/20  0654 10/28/20  1244 09/03/20  1213 07/20/20  1828   WBC 9.5 7.1 6.2  --   --  6.4 11.2* 7.1   HGB 11.4* 12.6  11.7 9.9* 10.3* 13.0 13.0 12.9    220 171  --   --  218 224 224   MCV 98 98 100  --   --  98 99 99   NEUTROPHIL 89 72.6 67.6  --   --   --  77.6 64.3     Recent Labs   Lab Test 08/11/21  1031 05/11/21  1007 01/28/21  1340 07/17/18  1346   BILITOTAL 0.4 0.5 0.4  --    ALKPHOS 88 94 86  --    ALT 17 18 21  --    AST 14 10 16  --    ALBUMIN 3.5 3.6 3.9  --      --   --  204     TSH   Date Value Ref Range Status   07/05/2021 1.68 0.40 - 4.00 mU/L Final   05/27/2021 1.93 0.40 - 4.00 mU/L Final   01/27/2021 2.42 0.40 - 4.00 mU/L Final     No results for input(s): CEA in the last 14875 hours.  Results for orders placed or performed in visit on 08/11/21   CT Abdomen wo & w Chest Pelvis w Contrast    Narrative    CT ABDOMEN WO & W CHEST PELVIS W CONTRAST 8/11/2021 11:11 AM    CLINICAL HISTORY: Urothelial cancer from upper tract post nephrectomy;  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)    TECHNIQUE: CT scan of the chest, abdomen, and pelvis was performed  without and following injection of IV contrast. Multiplanar reformats  were obtained. Dose reduction techniques were used. The CT protocol  and contrast administration was prescribed by the radiology department  at the [facility].  CONTRAST: Isovue 370 104cc    COMPARISON: 5/11/2021    FINDINGS:   LUNGS AND PLEURA: Stable small pulmonary nodules. For example a 5 mm  left upper lobe nodule (series 9, image 89) and 3 mm subpleural right  upper lobe nodule (series 9, image 85) are stable. Previously seen  groundglass opacities in the inferior aspect of the right upper lobe  have resolved. Stable mild mosaic attenuation in the lung bases. No  infiltrate or pleural effusion. No new or enlarging nodules. Right  lower lobe calcified granuloma.    MEDIASTINUM/AXILLAE: Enlarged multinodular thyroid gland, stable. No  lymphadenopathy. No thoracic aortic aneurysm. Mild coronary artery  calcifications. No pericardial effusion. Moderate-sized hiatal  hernia.    HEPATOBILIARY: Normal liver. Cholecystectomy. Stable mild central  intrahepatic and extrahepatic biliary ductal dilatation secondary to  cholecystectomy reservoir effect.    PANCREAS: Normal.    SPLEEN: Normal.    ADRENAL GLANDS: Normal.    KIDNEYS/BLADDER: Right nephrectomy. Stable 4 mm nonobstructing left  renal calculus. No suspicious left renal mass or hydronephrosis. No  suspicious urothelial enhancement or filling defect in the left renal  collecting system and left ureter. Partially distended urinary bladder  without focal wall thickening. 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    BOWEL: No obstruction or inflammatory change.    PELVIC ORGANS: No pelvic masses.    ADDITIONAL FINDINGS: Stable borderline enlarged 11 mm right common  iliac lymph node (series 6, image 407). No new or enlarging lymph  nodes in the abdomen and pelvis. No free fluid or extraluminal air.    MUSCULOSKELETAL: No suspicious lesions within the bones. Degenerative  changes in the spine.      Impression    IMPRESSION:  1.  Stable small pulmonary nodules measuring up to 5 mm.  2.  Resolution of mild groundglass opacities in the right upper lobe suggesting resolved infection or inflammation.   3.  No metastatic disease in the abdomen and pelvis.   4.  Stable borderline enlarged right common iliac lymph node measuring  11 mm. Continued attention on follow-up.  5.  Stable enlarged multinodular thyroid gland.  6.  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    TISHA MOYER MD         SYSTEM ID:  [id number]     *Note: Due to a large number of results and/or encounters for the requested time period, some results have not been displayed. A complete set of results can be found in Results Review.         ASSESSMENT AND PLAN: Ms. 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    1. Upper tract urothelial cancer:   - She completed 4 cycles of adjuvant carboplatin plus gemcitabine chemotherapy per POUT trial.    She completed chemotherapy in Feb 2019 over 2 yrs ago  - No residual side effects or evidence of recurrence.  - Reviewed restaging CT scan from Jan, March, May and now Aug 2021; there is no clear evidence of disease recurrence in abd/pelvis.   She continues to have pulmonary nodules which remain stable.    Lung findings are non-specific and these are also not responsible for her shortness of breath    - We will continue to monitor and her get a repeat CT C/A/P without contrast in 3-4 months.     2. High grade urothelial carcinoma in situ:  - Bx proven carcinoma in situ in 1/2020, completed the first round of intravesical BCG treatment 2/2020-3/2020 and second in 11/2020-12/2020.  - She was initially followed by Dr. King and now is under care of Dr. Froilan Henry.   - She did not have urine cytology done and I will get it after this visit.   - She will continue on 3 weekly BCG every 3 months as part of her maintenance along with follow up cystoscopies    3. Chemo induced anemia and thrombocytopenia:  - Resolved.     4. Persistent HERMAN:  - She follows Dr. Griffith in Cardiology for her h/o moderate AORTIC STENOSIS, CHF, and Afib now with worsening SOA and lymphedema with no cancer-related etiology evident.     Return to clinic in 3-4 months with restaging scans.    All of the above was explained to the patient in lay language and several questions were answered. They are in agreement with the plan.     25 minutes spent on the date of the encounter doing chart review, history and exam, documentation and further activities as noted above     Sd Fine    Hematologist and Medical Oncologist  [facility]             Again, thank you for allowing me to participate in the care of your patient.        Sincerely,        Sd  Marian Fine MD

## 2021-08-18 LAB
PATH REPORT.COMMENTS IMP SPEC: NORMAL
PATH REPORT.FINAL DX SPEC: NORMAL
PATH REPORT.GROSS SPEC: NORMAL
PATH REPORT.MICROSCOPIC SPEC OTHER STN: NORMAL
PATH REPORT.RELEVANT HX SPEC: NORMAL

## 2021-08-18 PROCEDURE — 88112 CYTOPATH CELL ENHANCE TECH: CPT | Mod: 26 | Performed by: PATHOLOGY

## 2021-08-23 ENCOUNTER — OFFICE VISIT (OUTPATIENT)
Dept: FAMILY MEDICINE | Facility: CLINIC | Age: 86
End: 2021-08-23
Payer: MEDICARE

## 2021-08-23 VITALS
BODY MASS INDEX: 36.46 KG/M2 | DIASTOLIC BLOOD PRESSURE: 72 MMHG | RESPIRATION RATE: 16 BRPM | TEMPERATURE: 98.5 F | HEART RATE: 68 BPM | WEIGHT: 169 LBS | OXYGEN SATURATION: 97 % | HEIGHT: 57 IN | SYSTOLIC BLOOD PRESSURE: 130 MMHG

## 2021-08-23 DIAGNOSIS — K62.5 BRBPR (BRIGHT RED BLOOD PER RECTUM): ICD-10-CM

## 2021-08-23 DIAGNOSIS — I10 HYPERTENSION GOAL BP (BLOOD PRESSURE) < 140/90: Primary | ICD-10-CM

## 2021-08-23 PROCEDURE — 99214 OFFICE O/P EST MOD 30 MIN: CPT | Performed by: FAMILY MEDICINE

## 2021-08-23 ASSESSMENT — MIFFLIN-ST. JEOR: SCORE: 1070.46

## 2021-08-23 NOTE — PROGRESS NOTES
"    Assessment & Plan     Hypertension goal BP (blood pressure) < 140/90  Blood pressure is looking better.  I suspect previously results could be from anxiety and recent steroid use.  Patient is tolerating current medication without any major side effects of concerns and current dose seems reasonable too.  Current medication regime is effective. Continue current treatment without any changes.       BRBPR (bright red blood per rectum)  Going for colonoscopy.  She is on a blood thinner and it may contribute.  At this point no adjustment of the medication except for stopping blood thinner day before her procedure.               BMI:   Estimated body mass index is 36.57 kg/m  as calculated from the following:    Height as of this encounter: 1.448 m (4' 9\").    Weight as of this encounter: 76.7 kg (169 lb).           No follow-ups on file.    Pop Zapien MD, MD  [facility]    Davey Musa is a 88 year old who presents for the following health issues     HPI     Hyperlipidemia Follow-Up      Are you regularly taking any medication or supplement to lower your cholesterol?   Yes- lovastatin 40mg    Are you having muscle aches or other side effects that you think could be caused by your cholesterol lowering medication?  No    Hypertension Follow-up      Do you check your blood pressure regularly outside of the clinic? No     Are you following a low salt diet? Yes- kind of     Are your blood pressures ever more than 140 on the top number (systolic) OR more   than 90 on the bottom number (diastolic), for example 140/90? No      How many servings of fruits and vegetables do you eat daily? 3-4    On average, how many sweetened beverages do you drink each day (Examples: soda, juice, sweet tea, etc.  Do NOT count diet or artificially sweetened beverages)?   0    How many days per week do you exercise enough to make your heart beat faster? 3 or less    How many minutes a day do you " "exercise enough to make your heart beat faster? 9 or less    How many days per week do you miss taking your medication? 0    184/78 last time. prenisone before bp appt.     Colonoscopy next week. Blood in stool.  It is daily.   No black stool.   Not on toilet paper or water. It clings to stool. She does not think it is bright red in color.     Review of Systems         Objective    /72 (BP Location: Right arm, Patient Position: Sitting, Cuff Size: Adult Regular)   Pulse 68   Temp 98.5  F (36.9  C) (Oral)   Resp 16   Ht 1.448 m (4' 9\")   Wt 76.7 kg (169 lb)   SpO2 97%   BMI 36.57 kg/m    Body mass index is 36.57 kg/m .  Physical Exam   Heart RRR  Lungs clear.                   "

## 2021-08-24 ENCOUNTER — TELEPHONE (OUTPATIENT)
Dept: GASTROENTEROLOGY | Facility: CLINIC | Age: 86
End: 2021-08-24

## 2021-08-24 DIAGNOSIS — G47.00 INSOMNIA, UNSPECIFIED TYPE: ICD-10-CM

## 2021-08-24 DIAGNOSIS — K62.5 BRBPR (BRIGHT RED BLOOD PER RECTUM): Primary | ICD-10-CM

## 2021-08-24 RX ORDER — BISACODYL 5 MG
TABLET, DELAYED RELEASE (ENTERIC COATED) ORAL
Qty: 4 TABLET | Refills: 0 | Status: SHIPPED | OUTPATIENT
Start: 2021-08-24 | End: 2022-05-02

## 2021-08-24 NOTE — TELEPHONE ENCOUNTER
Writer reviewed pre-assessment questions with patient prior to upcoming colonoscopy on 9.1.2021.      Covid test scheduled: 8.28.2021    Arrival time: 1215    Facility location: [facility]    Sedation type: CS    Electronic Implantable devices? No    Blood thinners/Antiplatelet medication? Xarelto, per pt's PCP pt should stop day prior to procedure.    Reviewed .nithyatyely prep instructions with patient.  No fiber/iron supplements or foods that contain nuts/seeds 7 days prior to procedure.     Designated  policy reviewed with patient.     Patient verbalized understanding.  No further questions or concerns.    Yajaira Garcia RN

## 2021-08-25 NOTE — TELEPHONE ENCOUNTER
"Dr. Leventhal would prefer pt hold Xarelto x 2 days.    RN reached out to Dr. Zapien who stated \"I think it would be OK to hold for 2 days but generally stopping for 24 hours would be enough with minor procedure.\"    RN contacted pt and informed pt to hold Xarelto x 2 days prior to procedure.  Pt agreeable to plan.    Pt has no further questions or concerns.      Yajaira Garcia RN      "

## 2021-08-27 PROCEDURE — 88120 CYTP URNE 3-5 PROBES EA SPEC: CPT | Mod: 26 | Performed by: PATHOLOGY

## 2021-08-28 ENCOUNTER — LAB (OUTPATIENT)
Dept: LAB | Facility: CLINIC | Age: 86
End: 2021-08-28
Payer: MEDICARE

## 2021-08-28 DIAGNOSIS — Z11.59 ENCOUNTER FOR SCREENING FOR OTHER VIRAL DISEASES: ICD-10-CM

## 2021-08-28 PROCEDURE — U0003 INFECTIOUS AGENT DETECTION BY NUCLEIC ACID (DNA OR RNA); SEVERE ACUTE RESPIRATORY SYNDROME CORONAVIRUS 2 (SARS-COV-2) (CORONAVIRUS DISEASE [COVID-19]), AMPLIFIED PROBE TECHNIQUE, MAKING USE OF HIGH THROUGHPUT TECHNOLOGIES AS DESCRIBED BY CMS-2020-01-R: HCPCS

## 2021-08-28 PROCEDURE — U0005 INFEC AGEN DETEC AMPLI PROBE: HCPCS

## 2021-08-29 LAB — SARS-COV-2 RNA RESP QL NAA+PROBE: NEGATIVE

## 2021-08-29 NOTE — TELEPHONE ENCOUNTER
Routing refill request to provider for review/approval because:  --Drug interaction warning and I am not finding a provider override with reason included.      --Last visit:  8/23/2021     --Future Visit:   Next 5 appointments (look out 90 days)    Nov 23, 2021 12:30 PM  (Arrive by 12:15 PM)  Return Visit with Sd Fine MD  [facility] ([facility] ) [address] [place] [id number] [phone]

## 2021-08-30 RX ORDER — TRAZODONE HYDROCHLORIDE 50 MG/1
TABLET, FILM COATED ORAL
Qty: 45 TABLET | Refills: 3 | Status: SHIPPED | OUTPATIENT
Start: 2021-08-30 | End: 2022-10-14

## 2021-09-01 ENCOUNTER — APPOINTMENT (OUTPATIENT)
Dept: GENERAL RADIOLOGY | Facility: CLINIC | Age: 86
End: 2021-09-01
Attending: EMERGENCY MEDICINE
Payer: MEDICARE

## 2021-09-01 ENCOUNTER — HOSPITAL ENCOUNTER (OUTPATIENT)
Facility: AMBULATORY SURGERY CENTER | Age: 86
End: 2021-09-01
Attending: INTERNAL MEDICINE
Payer: MEDICARE

## 2021-09-01 ENCOUNTER — HOSPITAL ENCOUNTER (EMERGENCY)
Facility: CLINIC | Age: 86
Discharge: HOME | End: 2021-09-02
Attending: EMERGENCY MEDICINE | Admitting: INTERNAL MEDICINE
Payer: MEDICARE

## 2021-09-01 VITALS
WEIGHT: 170 LBS | DIASTOLIC BLOOD PRESSURE: 75 MMHG | HEART RATE: 146 BPM | RESPIRATION RATE: 16 BRPM | SYSTOLIC BLOOD PRESSURE: 132 MMHG | OXYGEN SATURATION: 97 % | BODY MASS INDEX: 36.68 KG/M2 | HEIGHT: 57 IN | TEMPERATURE: 97.4 F

## 2021-09-01 DIAGNOSIS — I48.91 ATRIAL FIBRILLATION WITH RAPID VENTRICULAR RESPONSE (H): ICD-10-CM

## 2021-09-01 DIAGNOSIS — Z11.52 ENCOUNTER FOR SCREENING LABORATORY TESTING FOR COVID-19 VIRUS: ICD-10-CM

## 2021-09-01 DIAGNOSIS — E78.5 HYPERLIPIDEMIA LDL GOAL <130: ICD-10-CM

## 2021-09-01 DIAGNOSIS — R06.02 SHORTNESS OF BREATH: ICD-10-CM

## 2021-09-01 DIAGNOSIS — K92.1 HEMATOCHEZIA: Primary | ICD-10-CM

## 2021-09-01 LAB
ANION GAP SERPL CALCULATED.3IONS-SCNC: 8 MMOL/L (ref 3–14)
ATRIAL RATE - MUSE: 138 BPM
BASOPHILS # BLD AUTO: 0 10E3/UL (ref 0–0.2)
BASOPHILS NFR BLD AUTO: 1 %
BUN SERPL-MCNC: 14 MG/DL (ref 7–30)
CALCIUM SERPL-MCNC: 9.2 MG/DL (ref 8.5–10.1)
CHLORIDE BLD-SCNC: 102 MMOL/L (ref 94–109)
CO2 SERPL-SCNC: 26 MMOL/L (ref 20–32)
CREAT SERPL-MCNC: 1 MG/DL (ref 0.52–1.04)
DIASTOLIC BLOOD PRESSURE - MUSE: NORMAL MMHG
EOSINOPHIL # BLD AUTO: 0.1 10E3/UL (ref 0–0.7)
EOSINOPHIL NFR BLD AUTO: 2 %
ERYTHROCYTE [DISTWIDTH] IN BLOOD BY AUTOMATED COUNT: 12.5 % (ref 10–15)
GFR SERPL CREATININE-BSD FRML MDRD: 50 ML/MIN/1.73M2
GLUCOSE BLD-MCNC: 167 MG/DL (ref 70–99)
HCT VFR BLD AUTO: 38.2 % (ref 35–47)
HGB BLD-MCNC: 12.4 G/DL (ref 11.7–15.7)
HOLD SPECIMEN: NORMAL
IMM GRANULOCYTES # BLD: 0 10E3/UL
IMM GRANULOCYTES NFR BLD: 0 %
INTERPRETATION ECG - MUSE: NORMAL
LYMPHOCYTES # BLD AUTO: 1.4 10E3/UL (ref 0.8–5.3)
LYMPHOCYTES NFR BLD AUTO: 22 %
MAGNESIUM SERPL-MCNC: 2.3 MG/DL (ref 1.6–2.3)
MCH RBC QN AUTO: 31 PG (ref 26.5–33)
MCHC RBC AUTO-ENTMCNC: 32.5 G/DL (ref 31.5–36.5)
MCV RBC AUTO: 96 FL (ref 78–100)
MONOCYTES # BLD AUTO: 0.5 10E3/UL (ref 0–1.3)
MONOCYTES NFR BLD AUTO: 9 %
NEUTROPHILS # BLD AUTO: 4.2 10E3/UL (ref 1.6–8.3)
NEUTROPHILS NFR BLD AUTO: 66 %
NRBC # BLD AUTO: 0 10E3/UL
NRBC BLD AUTO-RTO: 0 /100
P AXIS - MUSE: NORMAL DEGREES
PLATELET # BLD AUTO: 260 10E3/UL (ref 150–450)
POTASSIUM BLD-SCNC: 3.5 MMOL/L (ref 3.4–5.3)
PR INTERVAL - MUSE: NORMAL MS
QRS DURATION - MUSE: 106 MS
QT - MUSE: 290 MS
QTC - MUSE: 428 MS
R AXIS - MUSE: -50 DEGREES
RBC # BLD AUTO: 4 10E6/UL (ref 3.8–5.2)
SARS-COV-2 RNA RESP QL NAA+PROBE: NEGATIVE
SODIUM SERPL-SCNC: 136 MMOL/L (ref 133–144)
SYSTOLIC BLOOD PRESSURE - MUSE: NORMAL MMHG
T AXIS - MUSE: 118 DEGREES
TROPONIN I SERPL-MCNC: <0.015 UG/L (ref 0–0.04)
VENTRICULAR RATE- MUSE: 131 BPM
WBC # BLD AUTO: 6.3 10E3/UL (ref 4–11)

## 2021-09-01 PROCEDURE — 84484 ASSAY OF TROPONIN QUANT: CPT | Performed by: EMERGENCY MEDICINE

## 2021-09-01 PROCEDURE — 99285 EMERGENCY DEPT VISIT HI MDM: CPT | Mod: 25 | Performed by: EMERGENCY MEDICINE

## 2021-09-01 PROCEDURE — 93005 ELECTROCARDIOGRAM TRACING: CPT | Performed by: EMERGENCY MEDICINE

## 2021-09-01 PROCEDURE — 250N000013 HC RX MED GY IP 250 OP 250 PS 637: Performed by: EMERGENCY MEDICINE

## 2021-09-01 PROCEDURE — 96374 THER/PROPH/DIAG INJ IV PUSH: CPT | Performed by: EMERGENCY MEDICINE

## 2021-09-01 PROCEDURE — 96361 HYDRATE IV INFUSION ADD-ON: CPT | Performed by: EMERGENCY MEDICINE

## 2021-09-01 PROCEDURE — 71045 X-RAY EXAM CHEST 1 VIEW: CPT

## 2021-09-01 PROCEDURE — 250N000009 HC RX 250: Performed by: EMERGENCY MEDICINE

## 2021-09-01 PROCEDURE — U0003 INFECTIOUS AGENT DETECTION BY NUCLEIC ACID (DNA OR RNA); SEVERE ACUTE RESPIRATORY SYNDROME CORONAVIRUS 2 (SARS-COV-2) (CORONAVIRUS DISEASE [COVID-19]), AMPLIFIED PROBE TECHNIQUE, MAKING USE OF HIGH THROUGHPUT TECHNOLOGIES AS DESCRIBED BY CMS-2020-01-R: HCPCS | Performed by: EMERGENCY MEDICINE

## 2021-09-01 PROCEDURE — 71045 X-RAY EXAM CHEST 1 VIEW: CPT | Mod: 26 | Performed by: RADIOLOGY

## 2021-09-01 PROCEDURE — 80048 BASIC METABOLIC PNL TOTAL CA: CPT | Performed by: EMERGENCY MEDICINE

## 2021-09-01 PROCEDURE — 83735 ASSAY OF MAGNESIUM: CPT | Performed by: EMERGENCY MEDICINE

## 2021-09-01 PROCEDURE — 85025 COMPLETE CBC W/AUTO DIFF WBC: CPT | Performed by: EMERGENCY MEDICINE

## 2021-09-01 PROCEDURE — 258N000003 HC RX IP 258 OP 636: Performed by: EMERGENCY MEDICINE

## 2021-09-01 PROCEDURE — 93010 ELECTROCARDIOGRAM REPORT: CPT | Performed by: INTERNAL MEDICINE

## 2021-09-01 PROCEDURE — 36415 COLL VENOUS BLD VENIPUNCTURE: CPT | Performed by: EMERGENCY MEDICINE

## 2021-09-01 PROCEDURE — 258N000003 HC RX IP 258 OP 636: Performed by: STUDENT IN AN ORGANIZED HEALTH CARE EDUCATION/TRAINING PROGRAM

## 2021-09-01 PROCEDURE — 93010 ELECTROCARDIOGRAM REPORT: CPT | Performed by: EMERGENCY MEDICINE

## 2021-09-01 PROCEDURE — C9803 HOPD COVID-19 SPEC COLLECT: HCPCS | Performed by: EMERGENCY MEDICINE

## 2021-09-01 PROCEDURE — 96376 TX/PRO/DX INJ SAME DRUG ADON: CPT | Performed by: EMERGENCY MEDICINE

## 2021-09-01 PROCEDURE — 99222 1ST HOSP IP/OBS MODERATE 55: CPT | Mod: AI | Performed by: INTERNAL MEDICINE

## 2021-09-01 PROCEDURE — 250N000013 HC RX MED GY IP 250 OP 250 PS 637: Performed by: STUDENT IN AN ORGANIZED HEALTH CARE EDUCATION/TRAINING PROGRAM

## 2021-09-01 RX ORDER — METOPROLOL TARTRATE 1 MG/ML
5 INJECTION, SOLUTION INTRAVENOUS ONCE
Status: COMPLETED | OUTPATIENT
Start: 2021-09-01 | End: 2021-09-01

## 2021-09-01 RX ORDER — PROPRANOLOL HCL 60 MG
60 CAPSULE, EXTENDED RELEASE 24HR ORAL DAILY
Status: DISCONTINUED | OUTPATIENT
Start: 2021-09-02 | End: 2021-09-02 | Stop reason: HOSPADM

## 2021-09-01 RX ORDER — ONDANSETRON 4 MG/1
4 TABLET, ORALLY DISINTEGRATING ORAL EVERY 6 HOURS PRN
Status: DISCONTINUED | OUTPATIENT
Start: 2021-09-01 | End: 2021-09-02 | Stop reason: HOSPADM

## 2021-09-01 RX ORDER — OXYCODONE HYDROCHLORIDE 5 MG/1
5 TABLET ORAL EVERY 4 HOURS PRN
Status: DISCONTINUED | OUTPATIENT
Start: 2021-09-01 | End: 2021-09-02 | Stop reason: HOSPADM

## 2021-09-01 RX ORDER — METHIMAZOLE 5 MG/1
5 TABLET ORAL EVERY OTHER DAY
Status: DISCONTINUED | OUTPATIENT
Start: 2021-09-03 | End: 2021-09-02 | Stop reason: HOSPADM

## 2021-09-01 RX ORDER — IRBESARTAN 300 MG/1
300 TABLET ORAL DAILY
Status: DISCONTINUED | OUTPATIENT
Start: 2021-09-01 | End: 2021-09-02 | Stop reason: HOSPADM

## 2021-09-01 RX ORDER — METOPROLOL TARTRATE 25 MG/1
25 TABLET, FILM COATED ORAL ONCE
Status: COMPLETED | OUTPATIENT
Start: 2021-09-01 | End: 2021-09-01

## 2021-09-01 RX ORDER — METHIMAZOLE 5 MG/1
2.5 TABLET ORAL EVERY OTHER DAY
Status: DISCONTINUED | OUTPATIENT
Start: 2021-09-02 | End: 2021-09-02 | Stop reason: HOSPADM

## 2021-09-01 RX ORDER — POTASSIUM CHLORIDE 1.5 G/1.58G
40 POWDER, FOR SOLUTION ORAL ONCE
Status: COMPLETED | OUTPATIENT
Start: 2021-09-01 | End: 2021-09-01

## 2021-09-01 RX ORDER — LIDOCAINE 40 MG/G
CREAM TOPICAL
Status: DISCONTINUED | OUTPATIENT
Start: 2021-09-01 | End: 2021-09-02 | Stop reason: HOSPADM

## 2021-09-01 RX ORDER — ACETAMINOPHEN 325 MG/1
975 TABLET ORAL EVERY 8 HOURS PRN
Status: DISCONTINUED | OUTPATIENT
Start: 2021-09-01 | End: 2021-09-02 | Stop reason: HOSPADM

## 2021-09-01 RX ORDER — SIMETHICONE
LIQUID (ML) MISCELLANEOUS PRN
Status: DISCONTINUED | OUTPATIENT
Start: 2021-09-01 | End: 2021-09-02 | Stop reason: HOSPADM

## 2021-09-01 RX ORDER — DILTIAZEM HYDROCHLORIDE 5 MG/ML
0.25 INJECTION INTRAVENOUS ONCE
Status: DISCONTINUED | OUTPATIENT
Start: 2021-09-01 | End: 2021-09-01

## 2021-09-01 RX ORDER — ROPINIROLE 0.5 MG/1
0.5 TABLET, FILM COATED ORAL AT BEDTIME
Status: DISCONTINUED | OUTPATIENT
Start: 2021-09-01 | End: 2021-09-02 | Stop reason: HOSPADM

## 2021-09-01 RX ORDER — ONDANSETRON 2 MG/ML
4 INJECTION INTRAMUSCULAR; INTRAVENOUS
Status: DISCONTINUED | OUTPATIENT
Start: 2021-09-01 | End: 2021-09-02 | Stop reason: HOSPADM

## 2021-09-01 RX ORDER — ATORVASTATIN CALCIUM 10 MG/1
10 TABLET, FILM COATED ORAL DAILY
Status: DISCONTINUED | OUTPATIENT
Start: 2021-09-02 | End: 2021-09-02 | Stop reason: HOSPADM

## 2021-09-01 RX ORDER — CYCLOSPORINE 0.5 MG/ML
1 EMULSION OPHTHALMIC 2 TIMES DAILY
Status: DISCONTINUED | OUTPATIENT
Start: 2021-09-02 | End: 2021-09-02 | Stop reason: HOSPADM

## 2021-09-01 RX ORDER — ACETAMINOPHEN 325 MG/1
650 TABLET ORAL EVERY 4 HOURS PRN
Status: DISCONTINUED | OUTPATIENT
Start: 2021-09-01 | End: 2021-09-01

## 2021-09-01 RX ORDER — ONDANSETRON 2 MG/ML
4 INJECTION INTRAMUSCULAR; INTRAVENOUS EVERY 6 HOURS PRN
Status: DISCONTINUED | OUTPATIENT
Start: 2021-09-01 | End: 2021-09-02 | Stop reason: HOSPADM

## 2021-09-01 RX ORDER — HYDROXYZINE HYDROCHLORIDE 10 MG/1
10 TABLET, FILM COATED ORAL EVERY 6 HOURS PRN
Status: DISCONTINUED | OUTPATIENT
Start: 2021-09-01 | End: 2021-09-02 | Stop reason: HOSPADM

## 2021-09-01 RX ORDER — CALCIUM CARBONATE 500 MG/1
1000 TABLET, CHEWABLE ORAL 4 TIMES DAILY PRN
Status: DISCONTINUED | OUTPATIENT
Start: 2021-09-01 | End: 2021-09-02 | Stop reason: HOSPADM

## 2021-09-01 RX ORDER — ROPINIROLE 0.25 MG/1
0.25 TABLET, FILM COATED ORAL 3 TIMES DAILY
Status: DISCONTINUED | OUTPATIENT
Start: 2021-09-01 | End: 2021-09-01

## 2021-09-01 RX ORDER — IRBESARTAN 300 MG/1
300 TABLET ORAL DAILY
Status: DISCONTINUED | OUTPATIENT
Start: 2021-09-02 | End: 2021-09-01

## 2021-09-01 RX ORDER — FUROSEMIDE 20 MG
20 TABLET ORAL EVERY MORNING
Status: DISCONTINUED | OUTPATIENT
Start: 2021-09-02 | End: 2021-09-02 | Stop reason: HOSPADM

## 2021-09-01 RX ORDER — ROPINIROLE 0.25 MG/1
0.25 TABLET, FILM COATED ORAL DAILY
Status: DISCONTINUED | OUTPATIENT
Start: 2021-09-02 | End: 2021-09-02 | Stop reason: HOSPADM

## 2021-09-01 RX ORDER — FERROUS SULFATE 325(65) MG
325 TABLET ORAL EVERY MORNING
Status: DISCONTINUED | OUTPATIENT
Start: 2021-09-02 | End: 2021-09-02 | Stop reason: HOSPADM

## 2021-09-01 RX ORDER — PANTOPRAZOLE SODIUM 40 MG/1
40 TABLET, DELAYED RELEASE ORAL
Status: DISCONTINUED | OUTPATIENT
Start: 2021-09-02 | End: 2021-09-02 | Stop reason: HOSPADM

## 2021-09-01 RX ORDER — NITROGLYCERIN 0.4 MG/1
0.4 TABLET SUBLINGUAL EVERY 5 MIN PRN
Status: DISCONTINUED | OUTPATIENT
Start: 2021-09-01 | End: 2021-09-02 | Stop reason: HOSPADM

## 2021-09-01 RX ADMIN — SODIUM CHLORIDE 500 ML: 9 INJECTION, SOLUTION INTRAVENOUS at 15:24

## 2021-09-01 RX ADMIN — POTASSIUM CHLORIDE 40 MEQ: 1.5 POWDER, FOR SOLUTION ORAL at 20:20

## 2021-09-01 RX ADMIN — SODIUM CHLORIDE, POTASSIUM CHLORIDE, SODIUM LACTATE AND CALCIUM CHLORIDE 500 ML: 600; 310; 30; 20 INJECTION, SOLUTION INTRAVENOUS at 21:57

## 2021-09-01 RX ADMIN — RIVAROXABAN 15 MG: 15 TABLET, FILM COATED ORAL at 20:12

## 2021-09-01 RX ADMIN — Medication 25 MG: at 22:32

## 2021-09-01 RX ADMIN — METOPROLOL TARTRATE 5 MG: 1 INJECTION, SOLUTION INTRAVENOUS at 15:23

## 2021-09-01 RX ADMIN — METOPROLOL TARTRATE 25 MG: 25 TABLET, FILM COATED ORAL at 17:56

## 2021-09-01 RX ADMIN — ROPINIROLE HYDROCHLORIDE 0.5 MG: 0.5 TABLET, FILM COATED ORAL at 22:32

## 2021-09-01 RX ADMIN — METOPROLOL TARTRATE 25 MG: 25 TABLET, FILM COATED ORAL at 16:19

## 2021-09-01 RX ADMIN — METOPROLOL TARTRATE 5 MG: 1 INJECTION, SOLUTION INTRAVENOUS at 19:44

## 2021-09-01 RX ADMIN — Medication 5 MG: at 22:32

## 2021-09-01 ASSESSMENT — ENCOUNTER SYMPTOMS
HEMATURIA: 0
SORE THROAT: 0
BLOOD IN STOOL: 1
NECK STIFFNESS: 0
FEVER: 0
COLOR CHANGE: 0
DIARRHEA: 1
HEADACHES: 0
CHILLS: 0
ABDOMINAL PAIN: 0
DIFFICULTY URINATING: 0
SHORTNESS OF BREATH: 1
COUGH: 0
VOMITING: 0
EYE REDNESS: 0
ARTHRALGIAS: 0
NAUSEA: 0
CONFUSION: 0

## 2021-09-01 ASSESSMENT — MIFFLIN-ST. JEOR
SCORE: 1074.99
SCORE: 1065.92

## 2021-09-01 NOTE — ED TRIAGE NOTES
Sent from GI clinic. Unable to perform scope d/t afib RVR in 140s. Known history. Stable BP. Ambulatory.

## 2021-09-01 NOTE — ED PROVIDER NOTES
"ED Provider Note  [facility]      History     Chief Complaint   Patient presents with     Irregular Heart Beat     HPI  Dimple Oviedo is an 88 year old female with a history of atrial fibrillation, PMR, stress-induced cardiomyopathy, anxiety, chronic systolic heart failure, chronic kidney disease, stage 3 CKD, Graves' disease, who presents to the emergency department today from the GI clinic after being instructed to come here for atrial fibrillation.  She has a known history of atrial fibrillation and is maintained on propranolol and is anticoagulated with Xarelto.  She has been having hematochezia on a daily basis for the past several months and was having a scheduled colonoscopy today for evaluation of this hematochezia.  When she arrived in clinic today she was found to be tachycardic and in atrial fib with RVR so clinic canceled the colonoscopy and sent her here to the emergency department.  She reports that she stopped her Eliquis 2 days ago as instructed.  She admits that she was not able to take all of her morning meds today with the exception of her thyroid medication.  She says that she was told to take her pills with \"a sip of water\" but did not think that she could take all of her morning pills with just a sip of water and so only took her thyroid med.  She reports that she was up most of the night on the toilet due to her colonoscopy prep.  She has not eaten or had much to drink at all.  She denies any chest pain, denies any palpitations.  She does endorse some shortness of breath which she is not certain how long this has been going on for.  This mainly seems to be with exertion.  Denies abdominal pain, nausea, or vomiting.  Denies any dizziness.  She endorses her baseline lower extremity swelling.    Most recent echocardiogram from January 2020 shows an EF of 60 to 65% with moderate diastolic dysfunction and moderate aortic stenosis as well as mild aortic " insufficiency.    Past Medical History:   Diagnosis Date     Arthritis      Calculus of kidney      Chemotherapy-induced neutropenia (H) 3/6/2019     Congestive heart failure (H)      Esophageal reflux      GERD (gastroesophageal reflux disease)      Hyperlipidemia LDL goal <130 5/9/2010     Malignant melanoma of skin of trunk, except scrotum (H)      Nonspecific abnormal finding     has living will 2004 -      Nontoxic multinodular goiter     no further eval /tx rec per pt     Osteopenia      Other psoriasis      Personal history of colonic polyps      PMR (polymyalgia rheumatica) (H)      Restless legs syndrome 7/11/2018     Stress-induced cardiomyopathy      Undiagnosed cardiac murmurs      Unspecified constipation      Unspecified essential hypertension      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       Past Surgical History:   Procedure Laterality Date     ARTHROPLASTY KNEE Right 11/9/2020    Procedure: ARTHROPLASTY, KNEE, TOTAL, RIGHT;  Surgeon: Edward Otero MD;  [facility]     BIOPSY       COLONOSCOPY  2014     COMBINED CYSTOSCOPY, INSERT STENT URETER(S) Bilateral 9/12/2018    Procedure: COMBINED CYSTOSCOPY, INSERT STENT URETER(S);  Cystoscopy Bilateral ureteral Stent Placement.;  Surgeon: Justin Henry MD;  [facility]     COMBINED CYSTOSCOPY, RETROGRADES, URETEROSCOPY, INSERT STENT Bilateral 4/3/2018    Procedure: COMBINED CYSTOSCOPY, RETROGRADES, URETEROSCOPY, INSERT STENT;;  Surgeon: Stuart King MD;  [facility]     COMBINED CYSTOSCOPY, RETROGRADES, URETEROSCOPY, INSERT STENT Bilateral 9/10/2018    Procedure: COMBINED CYSTOSCOPY, RETROGRADES, URETEROSCOPY, INSERT STENT;  Cystoscopy, Bilateral Ureteroscopy, Bladder Biopsies, Retrogram Pyelograms, Ureteral Washings and brushings, cysview;  Surgeon: Stuart King MD;  [facility]     COMBINED CYSTOSCOPY, RETROGRADES, URETEROSCOPY, INSERT STENT Left 8/26/2020    Procedure: Cystoscopy, Left Ureteral Wash,  Left ureteral Retrograde Pyelogram;  Surgeon: Justin Henry MD;  [facility]     CYSTOSCOPY, BIOPSY BLADDER INSTILL OPTICAL AGENT N/A 4/3/2018    Procedure: CYSTOSCOPY, BIOPSY BLADDER INSTILL OPTICAL AGENT;  Cystoscopy, Blue Light Cystoscopy, Bladder Biopsies, Bilateral Selective ureteral washings for Cytology, Bilateral Retrograde Pyelograms, Bilateral Ureteroscopy;  Surgeon: Stuart King MD;  [facility]     CYSTOSCOPY, BIOPSY BLADDER, COMBINED N/A 2/19/2018    Procedure: COMBINED CYSTOSCOPY, BIOPSY BLADDER;  Cystoscopy, Bladder Biopsy;  Surgeon: Kenna La MD;  [facility]     CYSTOSCOPY, BIOPSY BLADDER, COMBINED N/A 8/26/2020    Procedure: Cystoscopy, biopsy bladder, combined;  Surgeon: Justin Henry MD;  [facility]     CYSTOSCOPY, FULGURATE BLADDER TUMOR, COMBINED N/A 1/13/2020    Procedure: CYSTOSCOPY, WITH  bladder biopsies and fulguration;  Surgeon: Stuart King MD;  [facility]     CYSTOSCOPY, REMOVE STENT(S), COMBINED  11/13/2018    Procedure: Flexible Cystoscopy with Stent Removal;  Surgeon: Stuart King MD;  [facility]     DAVINCI LYMPHADENECTOMY N/A 11/13/2018    Procedure: Davinci Lymphadenectomy ;  Surgeon: Stuart King MD;  [facility]     DAVINCI NEPHROURETERECTOMY N/A 11/13/2018    Procedure: Right DaVinci Assisted Nephroureterectomy;  Surgeon: Stuart King MD;  [facility]     ENDOSCOPIC ULTRASOUND LOWER GASTROINTESTIONAL TRACT (GI) N/A 10/30/2015    Procedure: ENDOSCOPIC ULTRASOUND LOWER GASTROINTESTIONAL TRACT (GI);  Surgeon: Daniel Jean-Baptiste MD;  [facility]     EYE SURGERY  12/4/17     INSERT PORT VASCULAR ACCESS Right 12/19/2018    Procedure: Chest Port Placement - right;  Surgeon: Stuart Chavez PA-C;  [facility]     IR CHEST PORT PLACEMENT > 5 YRS OF AGE  12/19/2018     IR PORT REMOVAL RIGHT  6/26/2019     LAPAROSCOPIC CHOLECYSTECTOMY WITH  CHOLANGIOGRAMS N/A 11/1/2015    Procedure: LAPAROSCOPIC CHOLECYSTECTOMY WITH CHOLANGIOGRAMS;  Surgeon: Tonie Warren MD;  [facility]     REMOVE PORT VASCULAR ACCESS Right 6/26/2019    Procedure: Right Port Removal;  Surgeon: Froilan Irizarry PA-C;  [facility]     SURGICAL HISTORY OF -   1996    malignant melanoma     SURGICAL HISTORY OF -   1968    thyroid nodule     SURGICAL HISTORY OF -       D & C     ZZC NONSPECIFIC PROCEDURE  2005    colonoscopy polyp repeat 2010       Family History   Problem Relation Age of Onset     Cancer Father         dec - esophageal and laryngeal     Heart Disease Mother      Respiratory Mother         dec     Breast Cancer Daughter      Other Cancer Daughter      Thyroid Disease Daughter      Asthma Daughter      Hyperlipidemia Son      Diabetes Son      Anesthesia Reaction No family hx of      Deep Vein Thrombosis (DVT) No family hx of        Social History     Tobacco Use     Smoking status: Never Smoker     Smokeless tobacco: Never Used   Substance Use Topics     Alcohol use: No     Alcohol/week: 0.0 standard drinks     Comment: rare         Past Medical History  Past Medical History:   Diagnosis Date     Arthritis      Calculus of kidney      Chemotherapy-induced neutropenia (H) 3/6/2019     Congestive heart failure (H)      Esophageal reflux      GERD (gastroesophageal reflux disease)      Hyperlipidemia LDL goal <130 5/9/2010     Malignant melanoma of skin of trunk, except scrotum (H)      Nonspecific abnormal finding     has living will 2004 -      Nontoxic multinodular goiter     no further eval /tx rec per pt     Osteopenia      Other psoriasis      Personal history of colonic polyps      PMR (polymyalgia rheumatica) (H)      Restless legs syndrome 7/11/2018     Stress-induced cardiomyopathy      Undiagnosed cardiac murmurs      Unspecified constipation      Unspecified essential hypertension      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     Past Surgical  History:   Procedure Laterality Date     ARTHROPLASTY KNEE Right 11/9/2020    Procedure: ARTHROPLASTY, KNEE, TOTAL, RIGHT;  Surgeon: Edward Otero MD;  [facility]     BIOPSY       COLONOSCOPY  2014     COMBINED CYSTOSCOPY, INSERT STENT URETER(S) Bilateral 9/12/2018    Procedure: COMBINED CYSTOSCOPY, INSERT STENT URETER(S);  Cystoscopy Bilateral ureteral Stent Placement.;  Surgeon: Justin Henry MD;  [facility]     COMBINED CYSTOSCOPY, RETROGRADES, URETEROSCOPY, INSERT STENT Bilateral 4/3/2018    Procedure: COMBINED CYSTOSCOPY, RETROGRADES, URETEROSCOPY, INSERT STENT;;  Surgeon: Stuart King MD;  [facility]     COMBINED CYSTOSCOPY, RETROGRADES, URETEROSCOPY, INSERT STENT Bilateral 9/10/2018    Procedure: COMBINED CYSTOSCOPY, RETROGRADES, URETEROSCOPY, INSERT STENT;  Cystoscopy, Bilateral Ureteroscopy, Bladder Biopsies, Retrogram Pyelograms, Ureteral Washings and brushings, cysview;  Surgeon: Stuart King MD;  [facility]     COMBINED CYSTOSCOPY, RETROGRADES, URETEROSCOPY, INSERT STENT Left 8/26/2020    Procedure: Cystoscopy, Left Ureteral Wash, Left ureteral Retrograde Pyelogram;  Surgeon: Justin Henry MD;  [facility]     CYSTOSCOPY, BIOPSY BLADDER INSTILL OPTICAL AGENT N/A 4/3/2018    Procedure: CYSTOSCOPY, BIOPSY BLADDER INSTILL OPTICAL AGENT;  Cystoscopy, Blue Light Cystoscopy, Bladder Biopsies, Bilateral Selective ureteral washings for Cytology, Bilateral Retrograde Pyelograms, Bilateral Ureteroscopy;  Surgeon: Stuart King MD;  [facility]     CYSTOSCOPY, BIOPSY BLADDER, COMBINED N/A 2/19/2018    Procedure: COMBINED CYSTOSCOPY, BIOPSY BLADDER;  Cystoscopy, Bladder Biopsy;  Surgeon: Kenna La MD;  [facility]     CYSTOSCOPY, BIOPSY BLADDER, COMBINED N/A 8/26/2020    Procedure: Cystoscopy, biopsy bladder, combined;  Surgeon: Justin Henry MD;  [facility]     CYSTOSCOPY, FULGURATE BLADDER TUMOR,  COMBINED N/A 1/13/2020    Procedure: CYSTOSCOPY, WITH  bladder biopsies and fulguration;  Surgeon: Stuart King MD;  [facility]     CYSTOSCOPY, REMOVE STENT(S), COMBINED  11/13/2018    Procedure: Flexible Cystoscopy with Stent Removal;  Surgeon: Stuart King MD;  [facility]     DAVINCI LYMPHADENECTOMY N/A 11/13/2018    Procedure: Davinci Lymphadenectomy ;  Surgeon: Stuart King MD;  [facility]     DAVINCI NEPHROURETERECTOMY N/A 11/13/2018    Procedure: Right DaVinci Assisted Nephroureterectomy;  Surgeon: Stuart King MD;  [facility]     ENDOSCOPIC ULTRASOUND LOWER GASTROINTESTIONAL TRACT (GI) N/A 10/30/2015    Procedure: ENDOSCOPIC ULTRASOUND LOWER GASTROINTESTIONAL TRACT (GI);  Surgeon: Daniel Jean-Baptiste MD;  [facility]     EYE SURGERY  12/4/17     INSERT PORT VASCULAR ACCESS Right 12/19/2018    Procedure: Chest Port Placement - right;  Surgeon: Stuart Chavez PA-C;  [facility]     IR CHEST PORT PLACEMENT > 5 YRS OF AGE  12/19/2018     IR PORT REMOVAL RIGHT  6/26/2019     LAPAROSCOPIC CHOLECYSTECTOMY WITH CHOLANGIOGRAMS N/A 11/1/2015    Procedure: LAPAROSCOPIC CHOLECYSTECTOMY WITH CHOLANGIOGRAMS;  Surgeon: Tonie Warren MD;  [facility]     REMOVE PORT VASCULAR ACCESS Right 6/26/2019    Procedure: Right Port Removal;  Surgeon: Froilan Irizarry PA-C;  [facility]     SURGICAL HISTORY OF -   1996    malignant melanoma     SURGICAL HISTORY OF -   1968    thyroid nodule     SURGICAL HISTORY OF -       D & C     ZZC NONSPECIFIC PROCEDURE  2005    colonoscopy polyp repeat 2010     acetaminophen (TYLENOL) 325 MG tablet  alendronate (FOSAMAX) 70 MG tablet  bisacodyl (DULCOLAX) 5 MG EC tablet  calcium carbonate 1250 (500 Ca) MG CHEW  calcium carbonate-vitamin D (OS-SHREYA) 500-400 MG-UNIT tablet  cycloSPORINE (RESTASIS) 0.05 % ophthalmic emulsion  ferrous sulfate 325 (65 Fe) MG TBEC EC tablet  furosemide  (LASIX) 20 MG tablet  hydrOXYzine (ATARAX) 10 MG tablet  irbesartan (AVAPRO) 300 MG tablet  levofloxacin (LEVAQUIN) 500 MG tablet  lovastatin (MEVACOR) 40 MG tablet  melatonin 5 MG tablet  methimazole (TAPAZOLE) 5 MG tablet  Omega-3 Fatty Acids (FISH OIL) 500 MG CAPS  omeprazole (PRILOSEC) 20 MG DR capsule  ondansetron (ZOFRAN-ODT) 4 MG ODT tab  oxyCODONE (ROXICODONE) 5 MG tablet  polyethylene glycol (GOLYTELY) 236 g suspension  polyethylene glycol (MIRALAX) 17 g packet  Probiotic Product (PROBIOTIC ADVANCED PO)  propranolol ER (INDERAL LA) 60 MG 24 hr capsule  rivaroxaban ANTICOAGULANT (XARELTO) 15 MG TABS tablet  rOPINIRole (REQUIP) 0.25 MG tablet  senna-docusate (SENOKOT-S/PERICOLACE) 8.6-50 MG tablet  sertraline (ZOLOFT) 100 MG tablet  traZODone (DESYREL) 50 MG tablet      Allergies   Allergen Reactions     Codeine      Family History  Family History   Problem Relation Age of Onset     Cancer Father         dec - esophageal and laryngeal     Heart Disease Mother      Respiratory Mother         dec     Breast Cancer Daughter      Other Cancer Daughter      Thyroid Disease Daughter      Asthma Daughter      Hyperlipidemia Son      Diabetes Son      Anesthesia Reaction No family hx of      Deep Vein Thrombosis (DVT) No family hx of      Social History   Social History     Tobacco Use     Smoking status: Never Smoker     Smokeless tobacco: Never Used   Vaping Use     Vaping Use: Never used   Substance Use Topics     Alcohol use: No     Alcohol/week: 0.0 standard drinks     Comment: rare     Drug use: No      Past medical history, past surgical history, medications, allergies, family history, and social history were reviewed with the patient. No additional pertinent items.       Review of Systems   Constitutional: Negative for chills and fever.   HENT: Negative for congestion and sore throat.    Eyes: Negative for redness.   Respiratory: Positive for shortness of breath. Negative for cough.    Cardiovascular:  "Positive for leg swelling. Negative for chest pain.        Baseline pedal edema   Gastrointestinal: Positive for blood in stool and diarrhea. Negative for abdominal pain, nausea and vomiting.        Chronic ongoing hematochezia  Diarrhea from colonoscopy prep   Genitourinary: Negative for difficulty urinating and hematuria.   Musculoskeletal: Negative for arthralgias and neck stiffness.   Skin: Negative for color change.   Neurological: Negative for headaches.   Psychiatric/Behavioral: Negative for confusion.   All other systems reviewed and are negative.    A complete review of systems was performed with pertinent positives and negatives noted in the HPI, and all other systems negative.    Physical Exam   BP: (!) 137/97  Pulse: (!) 130  Temp: 97.8  F (36.6  C)  Resp: 16  Height: 144.8 cm (4' 9\")  Weight: 76.2 kg (168 lb)  SpO2: 99 %  Physical Exam  Vitals and nursing note reviewed.   Constitutional:       General: She is not in acute distress.     Appearance: She is well-developed. She is not diaphoretic.      Comments: Adult female, alert, cooperative, NAD   HENT:      Head: Normocephalic and atraumatic.      Mouth/Throat:      Mouth: Mucous membranes are moist.      Pharynx: Oropharynx is clear. No oropharyngeal exudate.   Eyes:      Conjunctiva/sclera: Conjunctivae normal.      Pupils: Pupils are equal, round, and reactive to light.   Cardiovascular:      Rate and Rhythm: Tachycardia present. Rhythm irregular.      Heart sounds: Normal heart sounds.   Pulmonary:      Effort: Pulmonary effort is normal. No respiratory distress.      Breath sounds: Normal breath sounds. No wheezing or rales.   Abdominal:      General: Bowel sounds are normal. There is no distension.      Palpations: Abdomen is soft.      Tenderness: There is no abdominal tenderness.      Comments: Obese, soft, nontender   Musculoskeletal:      Right lower leg: Edema present.      Left lower leg: Edema present.      Comments: 3+ LE edema,  "   Skin:     General: Skin is warm and dry.   Neurological:      Mental Status: She is alert and oriented to person, place, and time.         ED Course      Procedures       The medical record was reviewed and interpreted.  Current labs reviewed and interpreted.        EKG Interpretation:      Interpreted by Myra Brothers MD  Time reviewed:   Symptoms at time of EKG: SOB   Rhythm: Atrial fibrillation - rapid  Rate: 130-140  Axis: Left Axis Deviation  Ectopy: None  Conduction: Left bundle branch block (incomplete)  ST Segments/ T Waves: T wave inversion I and aVL  Q Waves: None  Comparison to prior: Unchanged    Clinical Impression: atrial fib with RVR         Results for orders placed or performed during the hospital encounter of 09/01/21   XR Chest Port 1 View     Status: None    Narrative    EXAMINATION: XR CHEST PORT 1 VIEW, 9/1/2021 4:55 PM    INDICATION: SOB, eval for fluid overload    COMPARISON: CT CAP 5/11/2021, CT chest 3/8/2021    FINDINGS: Single AP upright radiograph of the chest    Trachea is midline. Cardiomediastinal borders are clear. Cardiac  silhouette is stable. Hiatal hernia. No focal airspace opacity. No  pleural effusion. No pneumothorax.      Impression    IMPRESSION: No acute cardiac or pulmonary abnormalities. Moderate  hiatal hernia.    I have personally reviewed the examination and initial interpretation  and I agree with the findings.    REBA REED MD         SYSTEM ID:  [id number]   Extra Blue Top Tube     Status: None   Result Value Ref Range    Hold Specimen JIC    Extra Red Top Tube     Status: None   Result Value Ref Range    Hold Specimen JIC    Extra Green Top (Lithium Heparin) Tube     Status: None   Result Value Ref Range    Hold Specimen JIC    Extra Purple Top Tube     Status: None   Result Value Ref Range    Hold Specimen JIC    CBC with Platelets & Differential     Status: None    Narrative    The following orders were created for panel order CBC with Platelets &  Differential.  Procedure                               Abnormality         Status                     ---------                               -----------         ------                     CBC with platelets and d...[[id number]]                      Final result                 Please view results for these tests on the individual orders.   Troponin I     Status: Normal   Result Value Ref Range    Troponin I <0.015 0.000 - 0.045 ug/L   Basic metabolic panel     Status: Abnormal   Result Value Ref Range    Sodium 136 133 - 144 mmol/L    Potassium 3.5 3.4 - 5.3 mmol/L    Chloride 102 94 - 109 mmol/L    Carbon Dioxide (CO2) 26 20 - 32 mmol/L    Anion Gap 8 3 - 14 mmol/L    Urea Nitrogen 14 7 - 30 mg/dL    Creatinine 1.00 0.52 - 1.04 mg/dL    Calcium 9.2 8.5 - 10.1 mg/dL    Glucose 167 (H) 70 - 99 mg/dL    GFR Estimate 50 (L) >60 mL/min/1.73m2   Magnesium     Status: Normal   Result Value Ref Range    Magnesium 2.3 1.6 - 2.3 mg/dL   CBC with platelets and differential     Status: None   Result Value Ref Range    WBC Count 6.3 4.0 - 11.0 10e3/uL    RBC Count 4.00 3.80 - 5.20 10e6/uL    Hemoglobin 12.4 11.7 - 15.7 g/dL    Hematocrit 38.2 35.0 - 47.0 %    MCV 96 78 - 100 fL    MCH 31.0 26.5 - 33.0 pg    MCHC 32.5 31.5 - 36.5 g/dL    RDW 12.5 10.0 - 15.0 %    Platelet Count 260 150 - 450 10e3/uL    % Neutrophils 66 %    % Lymphocytes 22 %    % Monocytes 9 %    % Eosinophils 2 %    % Basophils 1 %    % Immature Granulocytes 0 %    NRBCs per 100 WBC 0 <1 /100    Absolute Neutrophils 4.2 1.6 - 8.3 10e3/uL    Absolute Lymphocytes 1.4 0.8 - 5.3 10e3/uL    Absolute Monocytes 0.5 0.0 - 1.3 10e3/uL    Absolute Eosinophils 0.1 0.0 - 0.7 10e3/uL    Absolute Basophils 0.0 0.0 - 0.2 10e3/uL    Absolute Immature Granulocytes 0.0 <=0.0 10e3/uL    Absolute NRBCs 0.0 10e3/uL   [place] Draw     Status: None    Narrative    The following orders were created for panel order [place] Draw.  Procedure                               Abnormality          Status                     ---------                               -----------         ------                     Extra Blue Top Tube[[id number]]                              Final result               Extra Red Top Tube[[id number]]                               Final result               Extra Green Top (Lithium...[[id number]]                      Final result               Extra Purple Top Tube[[id number]]                            Final result                 Please view results for these tests on the individual orders.   Results for orders placed or performed during the hospital encounter of 09/01/21   EKG 12-lead, tracing only     Status: None (Preliminary result)   Result Value Ref Range    Systolic Blood Pressure  mmHg    Diastolic Blood Pressure  mmHg    Ventricular Rate 131 BPM    Atrial Rate 138 BPM    IN Interval  ms    QRS Duration 106 ms     ms    QTc 428 ms    P Axis  degrees    R AXIS -50 degrees    T Axis 118 degrees    Interpretation ECG       Atrial fibrillation with rapid ventricular response  Left axis deviation  Incomplete left bundle branch block  Moderate voltage criteria for LVH, may be normal variant  ST & T wave abnormality, consider lateral ischemia  Abnormal ECG       Medications   rivaroxaban ANTICOAGULANT (XARELTO) tablet 15 mg (has no administration in time range)   metoprolol (LOPRESSOR) injection 5 mg (has no administration in time range)   0.9% sodium chloride BOLUS (0 mLs Intravenous Stopped 9/1/21 1800)   metoprolol (LOPRESSOR) injection 5 mg (5 mg Intravenous Given 9/1/21 1523)   metoprolol tartrate (LOPRESSOR) tablet 25 mg (25 mg Oral Given 9/1/21 1619)   metoprolol tartrate (LOPRESSOR) tablet 25 mg (25 mg Oral Given 9/1/21 1756)        Assessments & Plan (with Medical Decision Making)   Patient presents to the emergency department today due to report of atrial fibrillation with RVR, sent here from the GI clinic.  On exam she is tachycardic with an irregularly irregular  rhythm.  She has slight shortness of breath with this, but denies dizziness, palpitations, or chest pain.  She has known paroxysmal atrial fibrillation.  Suspect that this episode is likely due to her missing her propranolol this morning as she did not take most of her medications as she was NPO prior to her colonoscopy which was scheduled for today.    Blood pressure upon arrival is 137/97.  EKG was done and demonstrates atrial fibrillation with RVR with a rate of 131.  We did give her a dose of metoprolol 5 mg IV as she is already on beta-blockers at home.  CBC WNL (hemoglobin is 12.4), BMP WNL (K 3.5 - will give oral KCl to keep K > 4), magnesium 2.3, troponin negative.  Portable chest x-ray clear.  Patient was given gentle IV fluid boluses and was allowed to eat.    The etiology of her atrial fib with RVR is very likely due to missing her Inderal this morning.  We have have given her 1 dose of metoprolol 5 mg IV followed by metoprolol 25 mg p.o., and then a second 25 mg oral dose.  We watched her for several hours here in the emergency department but unfortunately her heart rate continues to vary from 110s up to 140s.  Do not believe it is safe to send an 88-year-old home alone with a heart rate in the 140s.  We will need to admit to the cardiology service at this time.  I have spoken to the cardiology staff who has agreed to admission.  I have also spoken to the cardiology fellow.    I have reviewed the nursing notes. I have reviewed the findings, diagnosis, plan and need for follow up with the patient.    New Prescriptions    No medications on file       Final diagnoses:   Atrial fibrillation with rapid ventricular response (H)       --  Myra Brothers MD  [facility] EMERGENCY DEPARTMENT  9/1/2021     Myra Brothers MD  09/01/21 1951

## 2021-09-01 NOTE — PROGRESS NOTES
Called to bedside given concerns for abnormal heart rhythm    Tele with irregularly regular rhythm  EKG with atrial fibrillation with rapid ventricular response    Variable heart rates but consistently 110s-140s    A/P:  - No felt safe to proceed with sedation in setting of significant RVR  - Will send to ER for further assessment and management    **Note: she is describing hematochezia.  - Will reschedule for flexible sigmoidoscopy (tap water enema prep) as an outpatient.    Thomas M. Leventhal, M.D.   of Medicine  Advanced/Transplant Hepatology & Critical Care Medicine  [facility]

## 2021-09-02 ENCOUNTER — APPOINTMENT (OUTPATIENT)
Dept: CARDIOLOGY | Facility: CLINIC | Age: 86
End: 2021-09-02
Payer: MEDICARE

## 2021-09-02 ENCOUNTER — TELEPHONE (OUTPATIENT)
Dept: FAMILY MEDICINE | Facility: CLINIC | Age: 86
End: 2021-09-02

## 2021-09-02 VITALS
RESPIRATION RATE: 16 BRPM | DIASTOLIC BLOOD PRESSURE: 89 MMHG | HEART RATE: 117 BPM | SYSTOLIC BLOOD PRESSURE: 130 MMHG | TEMPERATURE: 97.8 F | BODY MASS INDEX: 36.24 KG/M2 | WEIGHT: 168 LBS | HEIGHT: 57 IN | OXYGEN SATURATION: 95 %

## 2021-09-02 LAB
ANION GAP SERPL CALCULATED.3IONS-SCNC: 5 MMOL/L (ref 3–14)
ATRIAL RATE - MUSE: 159 BPM
BUN SERPL-MCNC: 14 MG/DL (ref 7–30)
CALCIUM SERPL-MCNC: 8.8 MG/DL (ref 8.5–10.1)
CHLORIDE BLD-SCNC: 109 MMOL/L (ref 94–109)
CO2 SERPL-SCNC: 25 MMOL/L (ref 20–32)
CREAT SERPL-MCNC: 0.97 MG/DL (ref 0.52–1.04)
DIASTOLIC BLOOD PRESSURE - MUSE: NORMAL MMHG
GFR SERPL CREATININE-BSD FRML MDRD: 52 ML/MIN/1.73M2
GLUCOSE BLD-MCNC: 107 MG/DL (ref 70–99)
HOLD SPECIMEN: NORMAL
INTERPRETATION ECG - MUSE: NORMAL
LVEF ECHO: NORMAL
MAGNESIUM SERPL-MCNC: 2.4 MG/DL (ref 1.6–2.3)
P AXIS - MUSE: NORMAL DEGREES
POTASSIUM BLD-SCNC: 4 MMOL/L (ref 3.4–5.3)
PR INTERVAL - MUSE: NORMAL MS
QRS DURATION - MUSE: 102 MS
QT - MUSE: 334 MS
QTC - MUSE: 508 MS
R AXIS - MUSE: -49 DEGREES
SODIUM SERPL-SCNC: 139 MMOL/L (ref 133–144)
SYSTOLIC BLOOD PRESSURE - MUSE: NORMAL MMHG
T AXIS - MUSE: 113 DEGREES
TSH SERPL DL<=0.005 MIU/L-ACNC: 1.24 MU/L (ref 0.4–4)
VENTRICULAR RATE- MUSE: 139 BPM

## 2021-09-02 PROCEDURE — C8929 TTE W OR WO FOL WCON,DOPPLER: HCPCS

## 2021-09-02 PROCEDURE — 999N000208 ECHOCARDIOGRAM COMPLETE

## 2021-09-02 PROCEDURE — 250N000013 HC RX MED GY IP 250 OP 250 PS 637: Performed by: PHYSICIAN ASSISTANT

## 2021-09-02 PROCEDURE — 93306 TTE W/DOPPLER COMPLETE: CPT | Mod: 26 | Performed by: INTERNAL MEDICINE

## 2021-09-02 PROCEDURE — 83735 ASSAY OF MAGNESIUM: CPT | Performed by: STUDENT IN AN ORGANIZED HEALTH CARE EDUCATION/TRAINING PROGRAM

## 2021-09-02 PROCEDURE — 36415 COLL VENOUS BLD VENIPUNCTURE: CPT | Performed by: STUDENT IN AN ORGANIZED HEALTH CARE EDUCATION/TRAINING PROGRAM

## 2021-09-02 PROCEDURE — 84443 ASSAY THYROID STIM HORMONE: CPT | Performed by: PHYSICIAN ASSISTANT

## 2021-09-02 PROCEDURE — 255N000002 HC RX 255 OP 636: Performed by: INTERNAL MEDICINE

## 2021-09-02 PROCEDURE — 250N000013 HC RX MED GY IP 250 OP 250 PS 637: Performed by: INTERNAL MEDICINE

## 2021-09-02 PROCEDURE — 99239 HOSP IP/OBS DSCHRG MGMT >30: CPT | Performed by: INTERNAL MEDICINE

## 2021-09-02 PROCEDURE — 80048 BASIC METABOLIC PNL TOTAL CA: CPT | Performed by: STUDENT IN AN ORGANIZED HEALTH CARE EDUCATION/TRAINING PROGRAM

## 2021-09-02 PROCEDURE — 250N000013 HC RX MED GY IP 250 OP 250 PS 637: Performed by: STUDENT IN AN ORGANIZED HEALTH CARE EDUCATION/TRAINING PROGRAM

## 2021-09-02 RX ORDER — METOPROLOL SUCCINATE 50 MG/1
50 TABLET, EXTENDED RELEASE ORAL DAILY
Status: DISCONTINUED | OUTPATIENT
Start: 2021-09-02 | End: 2021-09-02

## 2021-09-02 RX ORDER — DILTIAZEM HYDROCHLORIDE 120 MG/1
120 CAPSULE, COATED, EXTENDED RELEASE ORAL DAILY
Qty: 60 CAPSULE | Refills: 0 | Status: SHIPPED | OUTPATIENT
Start: 2021-09-02 | End: 2021-10-04

## 2021-09-02 RX ORDER — METOPROLOL TARTRATE 25 MG/1
25 TABLET, FILM COATED ORAL 2 TIMES DAILY
Status: DISCONTINUED | OUTPATIENT
Start: 2021-09-02 | End: 2021-09-02

## 2021-09-02 RX ORDER — DILTIAZEM HYDROCHLORIDE 120 MG/1
120 CAPSULE, COATED, EXTENDED RELEASE ORAL DAILY
Status: DISCONTINUED | OUTPATIENT
Start: 2021-09-02 | End: 2021-09-02 | Stop reason: HOSPADM

## 2021-09-02 RX ADMIN — Medication 325 MG: at 07:47

## 2021-09-02 RX ADMIN — CYCLOSPORINE 1 DROP: 0.5 EMULSION OPHTHALMIC at 07:48

## 2021-09-02 RX ADMIN — Medication 2.5 MG: at 07:55

## 2021-09-02 RX ADMIN — IRBESARTAN 300 MG: 300 TABLET, FILM COATED ORAL at 00:13

## 2021-09-02 RX ADMIN — PANTOPRAZOLE SODIUM 40 MG: 40 TABLET, DELAYED RELEASE ORAL at 07:47

## 2021-09-02 RX ADMIN — CALCIUM CARBONATE 600 MG (1,500 MG)-VITAMIN D3 400 UNIT TABLET 1 TABLET: at 07:47

## 2021-09-02 RX ADMIN — PROPRANOLOL HYDROCHLORIDE 60 MG: 60 CAPSULE, EXTENDED RELEASE ORAL at 07:54

## 2021-09-02 RX ADMIN — Medication 12.5 MG: at 00:12

## 2021-09-02 RX ADMIN — HUMAN ALBUMIN MICROSPHERES AND PERFLUTREN 6 ML: 10; .22 INJECTION, SOLUTION INTRAVENOUS at 08:12

## 2021-09-02 RX ADMIN — DILTIAZEM HYDROCHLORIDE 120 MG: 120 CAPSULE, COATED, EXTENDED RELEASE ORAL at 10:23

## 2021-09-02 RX ADMIN — SERTRALINE HYDROCHLORIDE 100 MG: 50 TABLET ORAL at 07:48

## 2021-09-02 RX ADMIN — IRBESARTAN 300 MG: 300 TABLET, FILM COATED ORAL at 07:54

## 2021-09-02 NOTE — H&P
[facility]   Cardiology Night Float  History & Physical    Dimple Oviedo  : 1933  MRN # [id number]    ADMIT DATE: 2021    PCP: Pop Zapien MD    CHIEF COMPLAINT: Afib with RVR    HPI:   Dimple Oviedo is a 88 year old female with PMH of paroxysmal Afib with RVR on Xarelto, resolved stress induced cardiomyopathy with decreased EF (2018), aortic stenosis, HTN, HLD, Graves disease, stage IV R renal pelvis carcinoma in remission, bladder cancer in situ on chemo, who is admitted for Afib with RVR.     Pt is scheduled for colonoscopy given BRBPR since 2021. Found to have Afib with RVR (HR 120s-130s), thus sent to ED. Asx. Pt denies chest pain, worsened SOB/HERMAN or LE edema, dizziness, palpitation, orthopnea, PND, fever/chill, dysuria. Stable wt. No change in med. Most recent chemo last week. Multiple BMs since last night while on colon prep. Not eating meals or taking med except methimazole. Hb stable. At ED, BP 140s/80s, on RA w/o distress, HR 110s-130s. Remain Afib with RVR on tele despite 500ml IVF, 2x IV 5mg metoprolol and 2x PO 25mg metoprolol.     F/u with Dr. Griffith for Afib as o/p. Last phone visit on 6/10/2021. HR has been 60s-70s. Recent EKG (2021) shows sinus rhythm. Not on any rate control med. Xarelto started in 2021. Last Echo 2020 shows LVEF 60-65%, moderate diastolic dysfx, normal RV, moderate AS, mild AR.     ROS:   12-pt ROS otherwise negative except as noted above    PMH:  Past Medical History:   Diagnosis Date     Arthritis      Calculus of kidney      Chemotherapy-induced neutropenia (H) 3/6/2019     Congestive heart failure (H)      Esophageal reflux      GERD (gastroesophageal reflux disease)      Hyperlipidemia LDL goal <130 2010     Malignant melanoma of skin of trunk, except scrotum (H)      Nonspecific abnormal finding     has living will  -      Nontoxic multinodular goiter     no further eval /tx rec per pt     Osteopenia       Other psoriasis      Personal history of colonic polyps      PMR (polymyalgia rheumatica) (H)      Restless legs syndrome 7/11/2018     Stress-induced cardiomyopathy      Undiagnosed cardiac murmurs      Unspecified constipation      Unspecified essential hypertension      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       PSH:  Past Surgical History:   Procedure Laterality Date     ARTHROPLASTY KNEE Right 11/9/2020    Procedure: ARTHROPLASTY, KNEE, TOTAL, RIGHT;  Surgeon: Edward Otero MD;  [facility]     BIOPSY       COLONOSCOPY  2014     COMBINED CYSTOSCOPY, INSERT STENT URETER(S) Bilateral 9/12/2018    Procedure: COMBINED CYSTOSCOPY, INSERT STENT URETER(S);  Cystoscopy Bilateral ureteral Stent Placement.;  Surgeon: Justin Henry MD;  [facility]     COMBINED CYSTOSCOPY, RETROGRADES, URETEROSCOPY, INSERT STENT Bilateral 4/3/2018    Procedure: COMBINED CYSTOSCOPY, RETROGRADES, URETEROSCOPY, INSERT STENT;;  Surgeon: Stuart King MD;  [facility]     COMBINED CYSTOSCOPY, RETROGRADES, URETEROSCOPY, INSERT STENT Bilateral 9/10/2018    Procedure: COMBINED CYSTOSCOPY, RETROGRADES, URETEROSCOPY, INSERT STENT;  Cystoscopy, Bilateral Ureteroscopy, Bladder Biopsies, Retrogram Pyelograms, Ureteral Washings and brushings, cysview;  Surgeon: Stuart King MD;  [facility]     COMBINED CYSTOSCOPY, RETROGRADES, URETEROSCOPY, INSERT STENT Left 8/26/2020    Procedure: Cystoscopy, Left Ureteral Wash, Left ureteral Retrograde Pyelogram;  Surgeon: Justin Henry MD;  [facility]     CYSTOSCOPY, BIOPSY BLADDER INSTILL OPTICAL AGENT N/A 4/3/2018    Procedure: CYSTOSCOPY, BIOPSY BLADDER INSTILL OPTICAL AGENT;  Cystoscopy, Blue Light Cystoscopy, Bladder Biopsies, Bilateral Selective ureteral washings for Cytology, Bilateral Retrograde Pyelograms, Bilateral Ureteroscopy;  Surgeon: Stuart King MD;  [facility]     CYSTOSCOPY, BIOPSY BLADDER, COMBINED N/A 2/19/2018     Procedure: COMBINED CYSTOSCOPY, BIOPSY BLADDER;  Cystoscopy, Bladder Biopsy;  Surgeon: Kenna La MD;  [facility]     CYSTOSCOPY, BIOPSY BLADDER, COMBINED N/A 8/26/2020    Procedure: Cystoscopy, biopsy bladder, combined;  Surgeon: Justin Henry MD;  [facility]     CYSTOSCOPY, FULGURATE BLADDER TUMOR, COMBINED N/A 1/13/2020    Procedure: CYSTOSCOPY, WITH  bladder biopsies and fulguration;  Surgeon: Stuart King MD;  [facility]     CYSTOSCOPY, REMOVE STENT(S), COMBINED  11/13/2018    Procedure: Flexible Cystoscopy with Stent Removal;  Surgeon: Stuart King MD;  [facility]     DAVINCI LYMPHADENECTOMY N/A 11/13/2018    Procedure: Davinci Lymphadenectomy ;  Surgeon: Stuart King MD;  [facility]     DAVINCI NEPHROURETERECTOMY N/A 11/13/2018    Procedure: Right DaVinci Assisted Nephroureterectomy;  Surgeon: Stuart King MD;  [facility]     ENDOSCOPIC ULTRASOUND LOWER GASTROINTESTIONAL TRACT (GI) N/A 10/30/2015    Procedure: ENDOSCOPIC ULTRASOUND LOWER GASTROINTESTIONAL TRACT (GI);  Surgeon: Daniel Jean-Baptiste MD;  [facility]     EYE SURGERY  12/4/17     INSERT PORT VASCULAR ACCESS Right 12/19/2018    Procedure: Chest Port Placement - right;  Surgeon: Stuart Chavez PA-C;  [facility]     IR CHEST PORT PLACEMENT > 5 YRS OF AGE  12/19/2018     IR PORT REMOVAL RIGHT  6/26/2019     LAPAROSCOPIC CHOLECYSTECTOMY WITH CHOLANGIOGRAMS N/A 11/1/2015    Procedure: LAPAROSCOPIC CHOLECYSTECTOMY WITH CHOLANGIOGRAMS;  Surgeon: Tonie Warren MD;  [facility]     REMOVE PORT VASCULAR ACCESS Right 6/26/2019    Procedure: Right Port Removal;  Surgeon: Froilan Irizarry PA-C;  [facility]     SURGICAL HISTORY OF -   1996    malignant melanoma     SURGICAL HISTORY OF -   1968    thyroid nodule     SURGICAL HISTORY OF -       D & C     ZZC NONSPECIFIC PROCEDURE  2005    colonoscopy polyp repeat  2010       MEDICATIONS:  Prior to Admission Medications   Prescriptions Last Dose Informant Patient Reported? Taking?   Omega-3 Fatty Acids (FISH OIL) 500 MG CAPS   Yes No   Sig: Take 1 capsule by mouth every morning    Probiotic Product (PROBIOTIC ADVANCED PO)   Yes No   Sig: Take 1 capsule by mouth every morning    acetaminophen (TYLENOL) 325 MG tablet   No No   Sig: Take 3 tablets (975 mg) by mouth every 8 hours as needed for pain   alendronate (FOSAMAX) 70 MG tablet   No No   Sig: TAKE 1 TABLET EVERY 7 DAYS AT LEAST 60 MINUTES BEFORE BREAKFAST AS DIRECTED.   bisacodyl (DULCOLAX) 5 MG EC tablet   No No   Sig: Take 2 tablets by mouth at bedtime two days prior to procedure.  Take 2 tabs by mouth at 3pm one day prior to procedure.   calcium carbonate 1250 (500 Ca) MG CHEW   Yes No   Sig: Take 1 tablet by mouth   calcium carbonate-vitamin D (OS-SHREYA) 500-400 MG-UNIT tablet   Yes No   Sig: Take 1 tablet by mouth daily   cycloSPORINE (RESTASIS) 0.05 % ophthalmic emulsion   Yes No   Sig: Place 1 drop into both eyes 2 times daily   ferrous sulfate 325 (65 Fe) MG TBEC EC tablet   Yes No   Sig: Take 325 mg by mouth every morning    furosemide (LASIX) 20 MG tablet   No No   Sig: TAKE 1 TABLET (20 MG) BY MOUTH EVERY MORNING   hydrOXYzine (ATARAX) 10 MG tablet   No No   Sig: Take 1 tablet (10 mg) by mouth every 6 hours as needed for itching or other (adjuvant pain)   irbesartan (AVAPRO) 300 MG tablet   No No   Sig: TAKE 1 TABLET EVERY DAY   levofloxacin (LEVAQUIN) 500 MG tablet   No No   Sig: Take one tablet 6 hours after treatment and one tablet the following morning after treatment.   lovastatin (MEVACOR) 40 MG tablet   No No   Sig: Take 1 tablet (40 mg) by mouth At Bedtime   melatonin 5 MG tablet   Yes No   Sig: Take 5 mg by mouth At Bedtime   methimazole (TAPAZOLE) 5 MG tablet   No No   Sig: Take 5 mg alternating with 2.5 mg every other day   omeprazole (PRILOSEC) 20 MG DR capsule   No No   Sig: TAKE 1 CAPSULE EVERY DAY    ondansetron (ZOFRAN-ODT) 4 MG ODT tab   No No   Sig: Take 1 tablet (4 mg) by mouth every 6 hours as needed for nausea or vomiting   oxyCODONE (ROXICODONE) 5 MG tablet   No No   Sig: Take 1 tablet (5 mg) by mouth every 4 hours as needed for moderate to severe pain   polyethylene glycol (GOLYTELY) 236 g suspension   No No   Sig: Take as directed in patient instructions.  One day before exam fill jug with powder and one gallon of water.  At 6:00pm drink 8oz glass of mixture every 15 min until the jug is half empty.  Store the rest in the refrigerator.  Day of exam: 6 hours prior to exam drink the other half of mixture.   polyethylene glycol (MIRALAX) 17 g packet   No No   Sig: Take 17 g by mouth daily   propranolol ER (INDERAL LA) 60 MG 24 hr capsule   No No   Sig: Take 1 capsule (60 mg) by mouth daily   rOPINIRole (REQUIP) 0.25 MG tablet   No No   Sig: TAKE 1 TABLET IN THE AFTERNOON AND TAKE 2 TABLETS EVERY NIGHT   rivaroxaban ANTICOAGULANT (XARELTO) 15 MG TABS tablet   No No   Sig: Take 1 tablet (15 mg) by mouth daily (with dinner)   senna-docusate (SENOKOT-S/PERICOLACE) 8.6-50 MG tablet   No No   Sig: Take 1-2 tablets by mouth 2 times daily as needed for constipation   sertraline (ZOLOFT) 100 MG tablet   No No   Sig: TAKE 1 TABLET EVERY MORNING   traZODone (DESYREL) 50 MG tablet   No No   Sig: TAKE 1/2 TABLET AT BEDTIME      Facility-Administered Medications Last Administration Doses Remaining   lidocaine (XYLOCAINE) 2 % external gel None recorded 1           ALLERGIES:     Allergies   Allergen Reactions     Codeine        FAMILY HISTORY:  Family History   Problem Relation Age of Onset     Cancer Father         dec - esophageal and laryngeal     Heart Disease Mother      Respiratory Mother         dec     Breast Cancer Daughter      Other Cancer Daughter      Thyroid Disease Daughter      Asthma Daughter      Hyperlipidemia Son      Diabetes Son      Anesthesia Reaction No family hx of      Deep Vein Thrombosis  (DVT) No family hx of        SOCIAL HISTORY:  Social History     Socioeconomic History     Marital status:      Spouse name:      Number of children: 2     Years of education: Not on file     Highest education level: Not on file   Occupational History     Employer: RETIRED   Tobacco Use     Smoking status: Never Smoker     Smokeless tobacco: Never Used   Vaping Use     Vaping Use: Never used   Substance and Sexual Activity     Alcohol use: No     Alcohol/week: 0.0 standard drinks     Comment: rare     Drug use: No     Sexual activity: Yes     Partners: Male   Other Topics Concern      Service No     Blood Transfusions No     Caffeine Concern Not Asked     Occupational Exposure Not Asked     Hobby Hazards Not Asked     Sleep Concern Not Asked     Stress Concern Not Asked     Weight Concern Not Asked     Special Diet Not Asked     Back Care Not Asked     Exercise Yes     Comment: curves     Bike Helmet Not Asked     Seat Belt Yes     Self-Exams Yes     Parent/sibling w/ CABG, MI or angioplasty before 65F 55M? No   Social History Narrative    December 7, 2009    Balanced Diet - Yes    Osteoporosis Prevention Measures - Dairy servings per day: 2 and Medication/Supplements (See current meds)    Regular Exercise -  Yes Describe curves, walking    Dental Exam - YES - Date: 10/2009    Eye Exam - YES - Date: 2008    Self Breast Exam - Yes    Abuse: Current or Past (Physical, Sexual or Emotional)- No    Do you feel safe in your environment - Yes    Guns stored in the home - No    Sunscreen used - Yes    Seatbelts used - Yes    Lipids -  YES - Date: 11/24/2009    Glucose -  YES - Date: 11/24/2009    Colon Cancer Screening - Colonoscopy 11/18/2005(date completed)    Hemoccults - YES - Date: 10/12/2004    Pap Test -  YES - Date: 09/22/2004    Do you have any concerns about STD's -  No    Mammography - YES - Date: 11/24/2009    DEXA - YES - Date: 11/20/2008    Immunizations reviewed and up to date - Yes    H.  "[facility] [address]             Social Determinants of Health     Financial Resource Strain:      Difficulty of Paying Living Expenses:    Food Insecurity:      Worried About Running Out of Food in the Last Year:      Ran Out of Food in the Last Year:    Transportation Needs:      Lack of Transportation (Medical):      Lack of Transportation (Non-Medical):    Physical Activity:      Days of Exercise per Week:      Minutes of Exercise per Session:    Stress:      Feeling of Stress :    Social Connections:      Frequency of Communication with Friends and Family:      Frequency of Social Gatherings with Friends and Family:      Attends Yazidi Services:      Active Member of Clubs or Organizations:      Attends Club or Organization Meetings:      Marital Status:    Intimate Partner Violence:      Fear of Current or Ex-Partner:      Emotionally Abused:      Physically Abused:      Sexually Abused:        PHYSICAL EXAM:  Blood pressure 127/85, pulse (!) 121, temperature 97.8  F (36.6  C), temperature source Oral, resp. rate 16, height 1.448 m (4' 9\"), weight 76.2 kg (168 lb), SpO2 95 %, not currently breastfeeding.     GENERAL: No acute distress, on RA  HEENT: EOMI, PERRLA  NECK: Supple and without lymphadenopathy. No JVD  CV: S1/S2 heard without murmur, irregular heart beats   RESPIRATORY: Normal breath sounds, no wheezes or crackles noted  GI: Soft and non distended with normoactive bowel sounds present in all quadrants. No tenderness, rebound, guarding  EXTREMITIES: b/l LE edema up to the knees. 2+ bilateral pedal pulses   NEUROLOGIC: Alert and orientated x 3. CN II-XII grossly intact. No focal deficits.   MUSCULOSKELETAL: No joint swelling or tenderness.   SKIN: No jaundice. No acute rashes or lesions.     LABS:  BMP  Recent Labs   Lab 09/01/21  1505      POTASSIUM 3.5   CHLORIDE 102   CO2 26   BUN 14   CR 1.00   *   SHREYA 9.2     CBC  Recent Labs   Lab 09/01/21  1505   WBC 6.3   HGB 12.4   HCT 38.2   MCV 96 "      LYMPH 22     INRNo lab results found in last 7 days.  LFTsNo lab results found in last 7 days.   INFNo lab results found in last 7 days.    IMAGING:    Results for orders placed or performed during the hospital encounter of 09/01/21   XR Chest Port 1 View    Narrative    EXAMINATION: XR CHEST PORT 1 VIEW, 9/1/2021 4:55 PM    INDICATION: SOB, eval for fluid overload    COMPARISON: CT CAP 5/11/2021, CT chest 3/8/2021    FINDINGS: Single AP upright radiograph of the chest    Trachea is midline. Cardiomediastinal borders are clear. Cardiac  silhouette is stable. Hiatal hernia. No focal airspace opacity. No  pleural effusion. No pneumothorax.      Impression    IMPRESSION: No acute cardiac or pulmonary abnormalities. Moderate  hiatal hernia.    I have personally reviewed the examination and initial interpretation  and I agree with the findings.    REBA REED MD         SYSTEM ID:  [id number]     *Note: Due to a large number of results and/or encounters for the requested time period, some results have not been displayed. A complete set of results can be found in Results Review.       ASSESSMENT & PLAN:  Dimple Oviedo is a 88 year old female with PMH of paroxysmal Afib with RVR on Xarelto, resolved stress induced cardiomyopathy with decreased EF (2018), aortic stenosis, HTN, HLD, Graves disease, stage IV R renal pelvis carcinoma in remission, bladder cancer in situ on chemo, who is admitted for Afib with RVR.     Afib with RVR on Xarelto  Moderate aortic stenosis  H/o stress induced cardiomyopathy with decreased EF (2018), recovered  Found to have Afib with RVR before scheduled colonoscopy on 9/1/2021. Has been sinus rhythm with controlled rate (HR 60s-70s in multiple clinic check in 8/2021). Remain Afib with RVR on tele despite 500ml IVF, 2x IV 5mg metoprolol and 2x PO 25mg metoprolol at ED. Asx. No signs of ACS or CHF (-ve trop, no ischemic change on EKG or chest pain, no JVD/orthopnea/PND/worsened  edema/wt gain). No signs of infection (no fever or leukocytosis). Does have Graves disease on methimazole, but stable and change in meds. Last TTE in 9/2020 shows LVEF 60-65% with moderate diastolic dysfx. C/f dehydration and stress from colon prep.  - s/p 500ml IVF, 2x IV 5mg and 2x 25mg PO metoprolol at ED  - Another 500mg LR (total 1L given)  - 1x 12.5mg metoprolol PO  - 1x 40meq KCl. Maintain K>4, Mg>2  - Continue PTA Xarelto  - Tele  - TTE in the morning    HTN  HLD  /85.   - Hold PTA lasix given possible dehydration after colon prep  - Hold PTA Irbesartan for now to give more BP room for metoprolol and diltiazem  - Continue atorvastatin 10mg daily    Bladder cancer on chemo  Stage IV R renal pelvis carcinoma in remission  F/u with urology and hem/onc. Recent chemo last week. Next in 11/2021.    Chronic issues:  Graves disease- PTA methimazole 5mg/2.5mg alternating every other day  GERD- PTA omeprazole  Restless leg syndrome- PTA Ropinirole  Depression/Anxiety- PTA Sertraline  Insomnia- PTA melatonin and trazodone      Floor Care  Fluids: s/p 1L IVF  Food: 2 gram sodium diet  DVT ppx: Xarelto  Catheters: none  Lines: PIV  Code Status: full  Discharge plan: CARD 1, anticipate discharge to prior living situation in 2-3 days      To be staffed in the AM with the cardiology team.      Radha Bansal MD, PhD  Internal Medicine, pgy-2  Pager: [phone]

## 2021-09-02 NOTE — TELEPHONE ENCOUNTER
Per discharge summary pt to continue both medication      - rate control: started diltiazem  mg daily, continue PTA propanolol 60 mg for Graves and tremor  - anticoagulation: rivaroxaban 15 mg (FAV2IM0-LESb score of 5)  - continue PTA irbesartan 300 mg daily   - continue PTA atorvastatin 10 mg daily

## 2021-09-02 NOTE — TELEPHONE ENCOUNTER
Writer reviewed message per Dr. Gaona.    Patient verbalized understanding and in agreement with plan.    MERE Murrell, RN  [facility]

## 2021-09-02 NOTE — DISCHARGE SUMMARY
[address] [address]  p: [phone]    Discharge Summary: Cardiology Service    Dimple Oviedo MRN# [id number]   YOB: 1933 Age: 88 year old       Admission Date: 9/1/2021  Discharge Date: 09/02/21    Discharge Diagnoses:  Paroxysmal atrial fibrillation with RVR  Moderate aortic stenosis   History of stress-induced cardiomyopathy  Chronic HFrEF with recovered EF  HTN  HLD  Hematochezia  Bladder cancer on chemo  Stage IV R renal pelvis carcinoma in remission  Graves disease  GERD  Restless leg syndrome  Depression/Anxiety  Insomnia  Lymphedema  Obesity, BMI 36    Brief HPI:  Dimple Oviedo is a 88 year old female with PMH of paroxysmal Afib on Xarelto, resolved stress induced cardiomyopathy with decreased EF (2018), aortic stenosis, HTN, HLD, Graves disease, stage IV R renal pelvis carcinoma in remission, bladder cancer in situ on chemo, who was admitted on 9/1/21 for Afib with RVR. Patient presented for scheduled colonoscopy on 9/1/21 and was noted to be in afib with RVR with HR of 120s-130s. Patient was asymptomatic and denied chest discomfort, palpitations, dyspnea, dizziness, or lightheadedness. Decreased oral intake and frequent stools due to bowel prep. Follows with Dr. Griffith in EP and not on rate control. On rivaroxaban for anticoagulation.     Hospital Course by Diagnosis:  Paroxysmal atrial fibrillation with RVR  Moderate aortic stenosis   History of stress-induced cardiomyopathy  Chronic HFrEF with recovered EF  HTN  HLD  Asymptomatic atrial fibrillation with RVR likely precipitated by dehydration and electrolyte shifts from frequent bowel movements 2/2 bowel prep for colonoscopy. TSH within normal limits. No sign of infection. No evidence of decompensated heart failure. Known history of PAF and was not on regular rate control. Treated with IV metoprolol 5 mg x2 and metoprolol tartrate 25 mg x2 in the ED. Converted to  sinus rhythm with HR in the 80s. But converted back to a-fib with RVR prior to discharge. Added diltiazem  mg daily to propanolol 60  Mg daily, which patient takes due to history of Grave's disease and tremor. Heart rate was adequately controlled <110 bpm at the time of discharge. Echocardiogram performed with LVEF of 55-60% and normal IVC.   - rate control: started diltiazem  mg daily, continue PTA propanolol 60 mg for Graves and tremor  - anticoagulation: rivaroxaban 15 mg ([id number] score of 5)  - continue PTA irbesartan 300 mg daily   - continue PTA atorvastatin 10 mg daily  - follow up with Dr. Griffith in 1 month for rate control check. Educated patient to check heart rate occasionally at home as she may no longer need additional rate control once diet resumes to normal.     Hematochezia   Recommend rescheduling colonoscopy.     Bladder cancer on chemo  Stage IV R renal pelvis carcinoma in remission  F/u with urology and hem/onc. Recent chemo last week. Next in 11/2021.     Chronic issues:  Graves disease- PTA methimazole 5mg/2.5mg alternating every other day. TSH within normal limits.  GERD- PTA omeprazole  Restless leg syndrome- PTA Ropinirole  Depression/Anxiety- PTA Sertraline  Insomnia- PTA melatonin and trazodone  Lymphedema- compression stockings  Obesity, BMI 36      Pertinent Procedures:  None    Consults:  None      Discharge medications:   Discharge Medication List as of 9/2/2021 10:23 AM      START taking these medications    Details   diltiazem ER COATED BEADS (CARDIZEM CD/CARTIA XT) 120 MG 24 hr capsule Take 1 capsule (120 mg) by mouth daily, Disp-60 capsule, R-0, E-Prescribe         CONTINUE these medications which have NOT CHANGED    Details   acetaminophen (TYLENOL) 325 MG tablet Take 3 tablets (975 mg) by mouth every 8 hours as needed for pain, Disp-1 Bottle, R-0, E-Prescribe      alendronate (FOSAMAX) 70 MG tablet TAKE 1 TABLET EVERY 7 DAYS AT LEAST 60 MINUTES BEFORE BREAKFAST  AS DIRECTED., Disp-12 tablet, R-3, E-Prescribe      calcium carbonate-vitamin D (OS-SHREYA) 500-400 MG-UNIT tablet Take 1 tablet by mouth daily, Historical      cycloSPORINE (RESTASIS) 0.05 % ophthalmic emulsion Place 1 drop into both eyes 2 times daily, Historical      ferrous sulfate 325 (65 Fe) MG TBEC EC tablet Take 325 mg by mouth every morning , Historical      furosemide (LASIX) 20 MG tablet TAKE 1 TABLET (20 MG) BY MOUTH EVERY MORNING, Disp-90 tablet, R-3, E-Prescribe      hydrOXYzine (ATARAX) 10 MG tablet Take 1 tablet (10 mg) by mouth every 6 hours as needed for itching or other (adjuvant pain), Disp-10 tablet, R-0, E-Prescribe      irbesartan (AVAPRO) 300 MG tablet TAKE 1 TABLET EVERY DAY, Disp-90 tablet, R-2, E-Prescribe      lovastatin (MEVACOR) 40 MG tablet Take 1 tablet (40 mg) by mouth At Bedtime, Disp-90 tablet, R-3, E-PrescribeProfile Rx: patient will contact pharmacy when needed      methimazole (TAPAZOLE) 5 MG tablet Take 5 mg alternating with 2.5 mg every other day, Disp-90 tablet, R-3, E-Prescribe      Omega-3 Fatty Acids (FISH OIL) 500 MG CAPS Take 1 capsule by mouth every morning , Historical      omeprazole (PRILOSEC) 20 MG DR capsule TAKE 1 CAPSULE EVERY DAY, Disp-90 capsule, R-1, E-Prescribe      ondansetron (ZOFRAN-ODT) 4 MG ODT tab Take 1 tablet (4 mg) by mouth every 6 hours as needed for nausea or vomiting, Disp-20 tablet, R-0, E-Prescribe      oxyCODONE (ROXICODONE) 5 MG tablet Take 1 tablet (5 mg) by mouth every 4 hours as needed for moderate to severe pain, Disp-20 tablet, R-0, E-Prescribe      polyethylene glycol (MIRALAX) 17 g packet Take 17 g by mouth daily, Disp-10 packet, R-0, E-Prescribe      Probiotic Product (PROBIOTIC ADVANCED PO) Take 1 capsule by mouth every morning , Historical      propranolol ER (INDERAL LA) 60 MG 24 hr capsule Take 1 capsule (60 mg) by mouth daily, Disp-90 capsule, R-2, E-Prescribe      rivaroxaban ANTICOAGULANT (XARELTO) 15 MG TABS tablet Take 1 tablet  (15 mg) by mouth daily (with dinner), Disp-90 tablet, R-3, E-Prescribe      rOPINIRole (REQUIP) 0.25 MG tablet TAKE 1 TABLET IN THE AFTERNOON AND TAKE 2 TABLETS EVERY NIGHT, Disp-270 tablet, R-3, E-Prescribe      sertraline (ZOLOFT) 100 MG tablet TAKE 1 TABLET EVERY MORNING, Disp-90 tablet, R-0, E-Prescribe      traZODone (DESYREL) 50 MG tablet TAKE 1/2 TABLET AT BEDTIME, Disp-45 tablet, R-3, E-Prescribe      bisacodyl (DULCOLAX) 5 MG EC tablet Take 2 tablets by mouth at bedtime two days prior to procedure.  Take 2 tabs by mouth at 3pm one day prior to procedure., Disp-4 tablet, R-0, E-Prescribe      levofloxacin (LEVAQUIN) 500 MG tablet Take one tablet 6 hours after treatment and one tablet the following morning after treatment., Disp-6 tablet, R-11, E-Prescribe      melatonin 5 MG tablet Take 5 mg by mouth At Bedtime, Historical      polyethylene glycol (GOLYTELY) 236 g suspension Take as directed in patient instructions.  One day before exam fill jug with powder and one gallon of water.  At 6:00pm drink 8oz glass of mixture every 15 min until the jug is half empty.  Store the rest in the refrigerator.  Day of exam: 6 hours prior  to exam drink the other half of mixture., Disp-4000 mL, R-0, E-PrescribePharmacy may substitute with equivalent.      senna-docusate (SENOKOT-S/PERICOLACE) 8.6-50 MG tablet Take 1-2 tablets by mouth 2 times daily as needed for constipation, Disp-30 tablet, R-0, E-Prescribe             Follow-up:  Dr. Griffith in EP in 1 month    Labs or imaging requiring follow-up after discharge:  None    Code status:  Full    Condition on discharge  Temp:  [97.8  F (36.6  C)] 97.8  F (36.6  C)  Pulse:  [] 117  Resp:  [16] 16  BP: (103-162)/() 130/89  SpO2:  [90 %-99 %] 95 %  General: Alert, interactive, no acute distress  HEENT: Normocephalic, atraumatic. Sclera anicteric.   Neck: JVP not elevated  Cardiovascular: Mild tachycardia, irregularly irregular rhythm, normal S1 and S2, no murmurs,  gallops, or rubs. Radial pulses palpable bilaterally.   Resp: Regular work of breathing on room air. Clear to auscultation bilaterally, no rales, wheezes, or rhonchi  Extremities: bilateral lower extremity lymphedema, unchanged from baseline per patient. Compression stockings in place.   Skin: Warm and dry, no jaundice or rash  Neuro: Alert and oriented x 3. CN 2-12 intact, moves all extremities equally, hard of hearing, normal speech  Psych: Mood and affect are appropriate    Imaging with results:  Echocardiogram 9/2/21:  Interpretation Summary  The patient's rhythm is atrial fibrillation.  Global and regional left ventricular function is normal with an EF of 55-60%.  RV is not well seen, fxn is probably normal to mildly reduced.  No significant valvular abnormalities present.  IVC diameter and respiratory changes fall into an intermediate range  suggesting an RA pressure of 8 mmHg.  No pericardial effusion is present.  There has been no change.    Recent Labs   Lab 09/02/21  0644 09/01/21  1505    136   POTASSIUM 4.0 3.5   CHLORIDE 109 102   CO2 25 26   ANIONGAP 5 8   * 167*   BUN 14 14   CR 0.97 1.00   GFRESTIMATED 52* 50*   SHREYA 8.8 9.2   MAG 2.4* 2.3     Patient Care Team:  Pop Zapien MD as PCP - General (Family Practice)  Vincenzo Tovar MD as MD (Family Medicine - Sports Medicine)  Candelaria Raymundo MD as MD (Gastroenterology)  Shavon Bustamante PA-C as Physician Assistant (Physician Assistant)  Kenna La MD as MD (Urology)  Cristiana Correa, RN as Registered Nurse (Urology)  Pop Zapien MD as Referring Physician (Family Practice)  Kiera Figueroa, RN as Registered Nurse (Urology)  Kiera Figueroa, RN as Registered Nurse (Urology)  Geovanna Fregoso PA as Physician Assistant (Urology)  Kenna La MD as MD (Urology)  Butch Griffith MD as MD (Clinical Cardiac Electrophysiology)  Butch Griffith MD as Assigned  Heart and Vascular Provider  Jaden Toledo MD as Assigned Cancer Care Provider  Pop Zapien MD as Assigned PCP  Justin Henry MD as Assigned Surgical Provider  Sd Fine MD as MD (Hematology & Oncology)  Rose Estrella, RN as Specialty Care Coordinator (Hematology & Oncology)  Nikunj Jeff MD as Assigned Musculoskeletal Provider    35 minutes spent in discharge, including >50% in counseling and coordination of care, medication review and plan of care recommended on follow up. Questions were answered.   It was our pleasure to care for Dimple Oviedo during this hospitalization. Please do not hesitate to contact me should there be questions regarding the hospital course or discharge plan.      Dhara Joyce PA-C  [facility]

## 2021-09-02 NOTE — TELEPHONE ENCOUNTER
[facility] Provider-Please review and advise; should patient hold Lovastatin for now?  Writer would recommend hospital follow up visit.  Earliest availability would be 9/9/21 with Dr. Gaona.    1. Patient had ER visit and admit 9/1/21-9/2/21 for A-fib with RVR   A. Started on Diltiazem, which has interaction with Lovastatin  2. Patient concerned about taking these medications as she was told not to take Lovastatin with Diltiazem.    Thank you!  SHASHANK MurrellN, RN  [facility]

## 2021-09-02 NOTE — TELEPHONE ENCOUNTER
I believe atorvastatin 10 mg daily was substituted for lovastatin 40  Mg daily while in hospital.     She can resume her lovastatin 40 mg daily.    There is an interaction of increased risk of rhabdomyolysis with use of both medications. Per cards note from today, I still would recommend being on both medication.     DM

## 2021-09-02 NOTE — TELEPHONE ENCOUNTER
Discussed with pt    Pt is taking lovastatin 40 mg, not atorvastatin 10 mg    She states her [employer] insurance told pharmacy she should not take this with the diltiazem.     She does not have appt yet with cardiology but will be seen within the month. We have made her virtual appt in f/u with Dr Zapien on 9/10    Should she just hold the statin until she is seen ? Or can you advise on the use of lovastatin 40?     Radha See RN, BSN  [facility]

## 2021-09-02 NOTE — TELEPHONE ENCOUNTER
Reason for Call:  Other call back and prescription    Detailed comments: PT WAS ON HOLD WITH [facility] NURSE LINE AND NO ANSWER. HOSP PRESCRIBED DITIAZEM 9/2 BUT PT IS ON LOVASTATIN. HOSP SAID NOT TO TAKE THEM TOGETHER. PT WAS TOLD CLINIC COULD LOWER DOSE OF DITIAZEM. PT WOULD LIKE TO SPEAK TO SOMEONE ABOUT THIS AND SEE IF CHANGES CAN BE MADE, SHE IS CONCERNED TO TAKE LOVASTATIN     Phone Number Patient can be reached at: Home number on file [phone] (home)    Best Time: ANYTIME    Can we leave a detailed message on this number? YES    Call taken on 9/2/2021 at 2:03 PM by Ambar Reese

## 2021-09-03 ENCOUNTER — PATIENT OUTREACH (OUTPATIENT)
Dept: CARE COORDINATION | Facility: CLINIC | Age: 86
End: 2021-09-03

## 2021-09-03 DIAGNOSIS — Z71.89 OTHER SPECIFIED COUNSELING: ICD-10-CM

## 2021-09-03 NOTE — PROGRESS NOTES
[facility]: Post-Discharge Note  SITUATION                                                      Admission:    Admission Date: 09/01/21   Reason for Admission: Irregular Heart Beat  Discharge:   Discharge Date: 09/02/21  Discharge Diagnosis: atrial fib with RVR    BACKGROUND                                                      ED Course   Procedures       The medical record was reviewed and interpreted.  Current labs reviewed and interpreted.       ASSESSMENT           Discharge Assessment  How are you doing now that you are home?: Patient is in very good spirits, said that she is doing very well.  How are your symptoms? (Red Flag symptoms escalate to triage hotline per guidelines): Improved  Do you feel your condition is stable enough to be safe at home until your provider visit?: Yes  Does the patient have their discharge instructions? : Yes  Does the patient have questions regarding their discharge instructions? : No  Were you started on any new medications or were there changes to any of your previous medications? : No  Does the patient have all of their medications?: Yes  Do you have questions regarding any of your medications? : No  Do you have all of your needed medical supplies or equipment (DME)?  (i.e. oxygen tank, CPAP, cane, etc.): Yes  Discharge follow-up appointment scheduled within 14 calendar days? : Yes  Discharge Follow Up Appointment Date: 09/10/21                  PLAN                                                      Outpatient Plan:      Future Appointments   Date Time Provider Department Center   9/10/2021  9:00 AM Pop Zapien MD [facility]   10/12/2021  4:00 PM Dhara Meza MD [facility]   11/12/2021 10:20 AM Justin Henry MD [facility]   11/19/2021  9:00 AM  LAB [facility]   11/19/2021  9:20 AM [id number]   11/23/2021 12:30 PM Sd Fine MD [facility]         For any urgent concerns, please contact our 24 hour nurse triage  line: [phone] ([phone])         Roberta Norman MA

## 2021-09-04 ENCOUNTER — HEALTH MAINTENANCE LETTER (OUTPATIENT)
Age: 86
End: 2021-09-04

## 2021-09-10 ENCOUNTER — VIRTUAL VISIT (OUTPATIENT)
Dept: FAMILY MEDICINE | Facility: CLINIC | Age: 86
End: 2021-09-10
Payer: MEDICARE

## 2021-09-10 DIAGNOSIS — I48.0 PAROXYSMAL ATRIAL FIBRILLATION (H): Primary | ICD-10-CM

## 2021-09-10 PROCEDURE — 99442 PR PHYSICIAN TELEPHONE EVALUATION 11-20 MIN: CPT | Mod: 95 | Performed by: FAMILY MEDICINE

## 2021-09-10 NOTE — PROGRESS NOTES
"Dimple is a 88 year old who is being evaluated via a billable telephone visit.      What phone number would you like to be contacted at? [phone]  How would you like to obtain your AVS? [facility]    Assessment & Plan     (I48.0) Paroxysmal atrial fibrillation (H)  (primary encounter diagnosis)  Comment: Now feeling she is at her baseline.  Plan: Recommend her to continue her anticoagulant and beta-blocker until she follows up with cardiologist.  If worsening of symptoms follow-up in emergency room.                 BMI:   Estimated body mass index is 36.35 kg/m  as calculated from the following:    Height as of 9/1/21: 1.448 m (4' 9\").    Weight as of 9/1/21: 76.2 kg (168 lb).           No follow-ups on file.    Pop Zapien MD, MD  [facility]    Davey Musa is a 88 year old who presents for the following health issues     HPI     ED/UC Followup:    Facility:  [facility]  Date of visit: 09/01/2021-09/02/2021  Reason for visit: Atrial fibrillation with rapid ventricular response  Current Status: still having SOB     Was scheduled for colonoscopy and from there was sent to ER from colonoscopy without procedure. No blood in stool anymore.     Short of breath - still there. Started again propranolol and blood thinner. Did not take it for the procedure.     Scheduling cardiologist Oct 27th.     Wondering about flu shot and covid booster shot.     Review of Systems         Objective    Vitals - Patient Reported  Weight (Patient Reported): 76.2 kg (168 lb)  Height (Patient Reported): 144.8 cm (4' 9\")  BMI (Based on Pt Reported Ht/Wt): 36.35        Physical Exam   healthy, alert and no distress  PSYCH: Alert and oriented times 3; coherent speech, normal   rate and volume, able to articulate logical thoughts, able   to abstract reason, no tangential thoughts, no hallucinations   or delusions  Her affect is normal  RESP: No cough, no audible wheezing, able to talk in full " sentences  Remainder of exam unable to be completed due to telephone visits                Phone call duration: 12 minutes

## 2021-10-10 ENCOUNTER — ANCILLARY PROCEDURE (OUTPATIENT)
Dept: GENERAL RADIOLOGY | Facility: CLINIC | Age: 86
End: 2021-10-10
Attending: INTERNAL MEDICINE
Payer: MEDICARE

## 2021-10-10 ENCOUNTER — OFFICE VISIT (OUTPATIENT)
Dept: URGENT CARE | Facility: URGENT CARE | Age: 86
End: 2021-10-10
Payer: MEDICARE

## 2021-10-10 VITALS
HEART RATE: 95 BPM | TEMPERATURE: 97.8 F | HEIGHT: 57 IN | DIASTOLIC BLOOD PRESSURE: 84 MMHG | SYSTOLIC BLOOD PRESSURE: 129 MMHG | OXYGEN SATURATION: 96 % | WEIGHT: 173 LBS | BODY MASS INDEX: 37.32 KG/M2

## 2021-10-10 DIAGNOSIS — R06.09 EXERTIONAL DYSPNEA: Primary | ICD-10-CM

## 2021-10-10 DIAGNOSIS — R06.09 EXERTIONAL DYSPNEA: ICD-10-CM

## 2021-10-10 DIAGNOSIS — I87.303 STASIS EDEMA OF BOTH LOWER EXTREMITIES: ICD-10-CM

## 2021-10-10 PROCEDURE — 71046 X-RAY EXAM CHEST 2 VIEWS: CPT | Performed by: RADIOLOGY

## 2021-10-10 PROCEDURE — 99214 OFFICE O/P EST MOD 30 MIN: CPT | Performed by: INTERNAL MEDICINE

## 2021-10-10 PROCEDURE — 93000 ELECTROCARDIOGRAM COMPLETE: CPT | Performed by: INTERNAL MEDICINE

## 2021-10-10 RX ORDER — FUROSEMIDE 20 MG
20 TABLET ORAL 2 TIMES DAILY
Qty: 90 TABLET | Refills: 1 | Status: SHIPPED | OUTPATIENT
Start: 2021-10-10 | End: 2022-03-15

## 2021-10-10 ASSESSMENT — MIFFLIN-ST. JEOR: SCORE: 1088.6

## 2021-10-10 NOTE — PROGRESS NOTES
"    Assessment & Plan     Exertional dyspnea  Progressive exercise intolerance in a patient who has history of atrial fib with RVR and diastolic dysfunction.  No known ischemic heart disease.  Considered possibility of poor control of her rate, new myocardial ischemia (she did have negative troponin when in ER), fluid overload and an increase in cardiac strain, lung disease, pulmonary vascular disease.  The assessment here in clinic is most indicative of some mild volume overload with an increase in weight and increase in pulmonary vascular congestion on the CXR. Will manage by tweaking up her diuretic.  The patient is overall stable and does not appear to have an acute process.  She will be following up with Dr. Griffith within several weeks.    - EKG 12-lead complete w/read - Clinics  - XR Chest 2 Views; Future    Stasis edema of both lower extremities  - furosemide (LASIX) 20 MG tablet; Take 1 tablet (20 mg) by mouth 2 times daily .  Take in the morning and after lunch.     Bautista Reese MD  [facility] [place]    Davey Musa is a 88 year old who presents for the following health issues  accompanied by her son:    HPI   This patient is experiencing several weeks of increasing exertional dyspnea and fatigue.  She denies cough. Sleep is \"not good\" due to RLS.  She typically will sleep propped up and this hasn't changed.  She denies chest pain. Denies palpitations. Denies lightheadedness.  Appetite and intake have been normal. She feels that her chronic leg edema is not worse than ususal.  She weighs herself once per week and states that things are \"holding their own.\"  Sees her cardiologist, Dr. Griffith, later this month.  At the beginning of September she was in for an elective colonoscopy when she was noted to be in atrial fib with RVR and was sent to ER.  She ended up getting started on diltiazem at that time. Since then she has done relatively well but now decreasing " "exercise tolerance.  States that her baseline exercise tolerance would be to take several laps walking around indoor atrium at her residence; now she can only manage one before she needs to stop and rest due to dyspnea.  She denies cough or wheeze.  She denies sputum production.  No change in urine output or change in the appearance of the urine.      Review of the EMR shows echocardiogram on 9/1/2021 with EF of 55-60% and mild concentric LVH.  She had a prior echocardiogram in January of 2020 that demonstrated diastolic dysfunction.      Review of Systems   Constitutional, HEENT, cardiovascular, pulmonary, gi and gu systems are negative, except as otherwise noted.      Objective    /84   Pulse 95   Temp 97.8  F (36.6  C) (Oral)   Ht 1.448 m (4' 9\")   Wt 78.5 kg (173 lb)   SpO2 96%   BMI 37.44 kg/m    Body mass index is 36.35 kg/m .  Physical Exam   GENERAL APPEARANCE: elderly female, alert and interactive  HENT: OP clear  NECK: short neck; no substantial JVD  RESP: lungs clear to auscultation - no rales, rhonchi or wheezes  CV: IRIR at a rate in the 90s with normal S1/S2 and no murmur or rub    CXR, which I have personally reviewed and interpreted, shows some relative increase in cardiothoracic ratio relative to baseline CXR obtained in 2018; there is increased vascular prominence and some fuzzy obscuring of the heart border in sandip-hilar region.              "

## 2021-10-10 NOTE — PATIENT INSTRUCTIONS
Your weight and your chest x-ray are indicating that your holding on to some extra fluid.  We will increase your furosemide dose to 20 mg twice daily.  When you see Dr. Griffith this can be reassessed to see whether you stay at this dose or not.      Other medications stay the same.    I would advise that you bring your pill-box and your medications in with you when you see Dr. Griffith and can make sure that you're setting up your medications the right way.    Weigh yourself each day in the morning and keep a log of your weight.  This will be useful information to help you manage your fluid balance and adjust diuretic dosing if needed.

## 2021-10-11 DIAGNOSIS — E78.5 HYPERLIPIDEMIA LDL GOAL <130: ICD-10-CM

## 2021-10-11 DIAGNOSIS — I10 ESSENTIAL HYPERTENSION WITH GOAL BLOOD PRESSURE LESS THAN 140/90: ICD-10-CM

## 2021-10-12 ENCOUNTER — OFFICE VISIT (OUTPATIENT)
Dept: ENDOCRINOLOGY | Facility: CLINIC | Age: 86
End: 2021-10-12
Payer: MEDICARE

## 2021-10-12 VITALS
BODY MASS INDEX: 37.19 KG/M2 | WEIGHT: 172.4 LBS | HEIGHT: 57 IN | SYSTOLIC BLOOD PRESSURE: 146 MMHG | DIASTOLIC BLOOD PRESSURE: 84 MMHG | HEART RATE: 70 BPM

## 2021-10-12 DIAGNOSIS — E05.00 GRAVES DISEASE: ICD-10-CM

## 2021-10-12 DIAGNOSIS — I87.303 STASIS EDEMA OF BOTH LOWER EXTREMITIES: ICD-10-CM

## 2021-10-12 DIAGNOSIS — E04.2 NONTOXIC MULTINODULAR GOITER: Primary | ICD-10-CM

## 2021-10-12 PROCEDURE — 99215 OFFICE O/P EST HI 40 MIN: CPT | Performed by: INTERNAL MEDICINE

## 2021-10-12 RX ORDER — FUROSEMIDE 20 MG
TABLET ORAL
Qty: 180 TABLET | OUTPATIENT
Start: 2021-10-12

## 2021-10-12 ASSESSMENT — MIFFLIN-ST. JEOR: SCORE: 1085.88

## 2021-10-12 ASSESSMENT — PAIN SCALES - GENERAL: PAINLEVEL: NO PAIN (0)

## 2021-10-12 NOTE — PROGRESS NOTES
Assessment & Plan     Nontoxic multinodular goiter  On exam today she has a new nodule in her right lobe.  I will do an US to evaluate.    - US Thyroid; Future    Graves disease  TSH obtained when she was in the hospital in Sept was in target.  Clinically she is doing well. No changes in methimazole dose.   - TSH with free T4 reflex; Future    HERMAN  She has atrial fibrillation with a history of stress cardiomyopathy.  Rate appears to be well controlled today and normal TSH at time of ED visit tells me that recurrent hyperthyroidism did not precipitate her sx.  Her lungs are clear today and her edema is stable.  Her PCP increased the diuretic yesterday and she will see her cardiologist in 10 days.  I made no changes in her meds today.      Return in about 6 months (around 4/12/2022).     I spent 25 min with the patient (start 4:15 and end 4:40.  On the same day of visit I spent an additional 15 min reviewing chart, labs, ordering tests and doing documentation.    Dhara Meza MD  [facility]CR[facility]   Dimple is a 88 year old who presents for the following health issues  accompanied by her sone:    HPI       This 88  year old woman was seen for f/u of Graves.  She first developed symptoms of hyperthyroidism in December 2017.  She was most troubled by the tremor that occurred.  She had not noticed change in her skin or hair.  She saw Dr. Rojas in the community and he started her on methimazole.  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  Because she had received an iodine dye load when she had a coronary angiogram, we were unable to do a radioiodine scan and uptake to determine the precise cause of her hyperthyroidism.  She was maintained on methimazole until June 2017 when it was stopped.  A radioiodine uptake and scan was done in July, showed an uptake of ~ 70%, and confirmed  she had Graves' disease.  She was restarted on methimazole in July 2018 and we have been adjusting the dose since then.  She has been taking 5 mg alternating with 2.5  every AM every other day.  On this dose she generally felt well.Her energy level is good.  Her appetite is fine.  She has no tremor or palpitations.  She does not feel too hot or cold.  She denies diplopia or mattering.  She doesn't think her thyroid has changed in size.    She had been having some hematochezia in the past few months and was scheduled to have a colonoscopy for w/u.  However she was found to be in A fib (not a new problem )and was sent to the ER.  She was admitted on 9/1/21 and discharged on 9/2/21. she was troponin negative and diltiazem was added. She is due to see cardiology in a couple of weeks (10/27). On 10/10 she saw her PCP because of persistent SOB.  He thought she had mild fluid overload and increased her diuretic.    Today she says she continues to have HERMAN.  She feels comfortable at rest.  She is able to do ADLs at home.  She isn't sure she is better than yesterday. She denies CP or worsening edema.   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  She is being carefully monitored for recurrence.  She is pain-free and feels very good.    Patient Active Problem List   Diagnosis     Esophageal reflux     Restless leg syndrome     heat intolerance     Goiter     Disorder of bone and cartilage     Other psoriasis     perirectal cyst     Malignant melanoma of skin of trunk, except scrotum (H)     Nontoxic multinodular goiter     Hyperlipidemia LDL goal <130     Hypertension goal BP (blood pressure) < 140/90     Urinary incontinence     Hip arthritis     Polymyalgia rheumatica (H)     High risk medication use     Shoulder pain     Impaired fasting blood sugar     Chronic bilateral low back pain without sciatica     Obesity, unspecified obesity severity, unspecified obesity type     Iron  deficiency anemia, unspecified iron deficiency anemia type     NSTEMI (non-ST elevated myocardial infarction) (H)     Stress-induced cardiomyopathy     A-fib (H)     Anxiety     Chronic systolic heart failure (H)     Urothelial carcinoma (H)     Hyperthyroidism     CRESENCIO (acute kidney injury) (H)     Hydronephrosis     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)     Graves disease     Encounter for antineoplastic chemotherapy     Chemotherapy-induced neutropenia (H)     Primary localized osteoarthritis of right knee     Urothelial cancer (H)     Malignant neoplasm of overlapping sites of bladder (H)     Primary osteoarthritis of right knee     Chronic kidney disease, stage 3 (H)     Lipedematous scalp     Atrial fibrillation with rapid ventricular response (H)     Current Outpatient Medications   Medication     acetaminophen (TYLENOL) 325 MG tablet     alendronate (FOSAMAX) 70 MG tablet     bisacodyl (DULCOLAX) 5 MG EC tablet     calcium carbonate-vitamin D (OS-SHREYA) 500-400 MG-UNIT tablet     cycloSPORINE (RESTASIS) 0.05 % ophthalmic emulsion     diltiazem ER COATED BEADS (CARDIZEM CD/CARTIA XT) 120 MG 24 hr capsule     ferrous sulfate 325 (65 Fe) MG TBEC EC tablet     furosemide (LASIX) 20 MG tablet     hydrOXYzine (ATARAX) 10 MG tablet     irbesartan (AVAPRO) 300 MG tablet     levofloxacin (LEVAQUIN) 500 MG tablet     lovastatin (MEVACOR) 40 MG tablet     melatonin 5 MG tablet     methimazole (TAPAZOLE) 5 MG tablet     Omega-3 Fatty Acids (FISH OIL) 500 MG CAPS     omeprazole (PRILOSEC) 20 MG DR capsule     ondansetron (ZOFRAN-ODT) 4 MG ODT tab     oxyCODONE (ROXICODONE) 5 MG tablet     polyethylene glycol (GOLYTELY) 236 g suspension     polyethylene glycol (MIRALAX) 17 g packet     Probiotic Product (PROBIOTIC ADVANCED PO)     propranolol ER (INDERAL LA) 60 MG 24 hr capsule     rivaroxaban ANTICOAGULANT (XARELTO) 15 MG TABS tablet     rOPINIRole (REQUIP) 0.25 MG tablet     senna-docusate (SENOKOT-S/PERICOLACE)  "8.6-50 MG tablet     sertraline (ZOLOFT) 100 MG tablet     traZODone (DESYREL) 50 MG tablet     Current Facility-Administered Medications   Medication     lidocaine (XYLOCAINE) 2 % external gel       Current Outpatient Medications   Medication     acetaminophen (TYLENOL) 325 MG tablet     alendronate (FOSAMAX) 70 MG tablet     bisacodyl (DULCOLAX) 5 MG EC tablet     calcium carbonate-vitamin D (OS-SHREYA) 500-400 MG-UNIT tablet     cycloSPORINE (RESTASIS) 0.05 % ophthalmic emulsion     diltiazem ER COATED BEADS (CARDIZEM CD/CARTIA XT) 120 MG 24 hr capsule     ferrous sulfate 325 (65 Fe) MG TBEC EC tablet     furosemide (LASIX) 20 MG tablet     hydrOXYzine (ATARAX) 10 MG tablet     irbesartan (AVAPRO) 300 MG tablet     levofloxacin (LEVAQUIN) 500 MG tablet     lovastatin (MEVACOR) 40 MG tablet     melatonin 5 MG tablet     methimazole (TAPAZOLE) 5 MG tablet     Omega-3 Fatty Acids (FISH OIL) 500 MG CAPS     omeprazole (PRILOSEC) 20 MG DR capsule     ondansetron (ZOFRAN-ODT) 4 MG ODT tab     oxyCODONE (ROXICODONE) 5 MG tablet     polyethylene glycol (GOLYTELY) 236 g suspension     polyethylene glycol (MIRALAX) 17 g packet     Probiotic Product (PROBIOTIC ADVANCED PO)     propranolol ER (INDERAL LA) 60 MG 24 hr capsule     rivaroxaban ANTICOAGULANT (XARELTO) 15 MG TABS tablet     rOPINIRole (REQUIP) 0.25 MG tablet     senna-docusate (SENOKOT-S/PERICOLACE) 8.6-50 MG tablet     sertraline (ZOLOFT) 100 MG tablet     traZODone (DESYREL) 50 MG tablet     Current Facility-Administered Medications   Medication     lidocaine (XYLOCAINE) 2 % external gel   BP (!) 146/84 (BP Location: Right arm, Patient Position: Sitting, Cuff Size: Adult Large)   Pulse 70   Ht 1.448 m (4' 9\")   Wt 78.2 kg (172 lb 6.4 oz)   BMI 37.31 kg/m       She is in NAD.  She is wearing a mask and sitting comfortably in a chair. She is able to move to exam table without difficulty.    She is without periorbital edema.  Neck - has a 1.5 x 1.5 cm soft nodule " in right lower lobe.  Left lobe palpated but only about 2x normal in size without nodules.  Chest - clear  CV- Irregularly irregular rhythm at ~ 70  Ext - lymphedema to knees      Admission on 09/01/2021, Discharged on 09/02/2021   Component Date Value Ref Range Status     Ventricular Rate 09/01/2021 139  BPM Incomplete     Atrial Rate 09/01/2021 159  BPM Incomplete     QRS Duration 09/01/2021 102  ms Incomplete     QT 09/01/2021 334  ms Incomplete     QTc 09/01/2021 508  ms Incomplete     R AXIS 09/01/2021 -49  degrees Incomplete     T Axis 09/01/2021 113  degrees Incomplete     Interpretation ECG 09/01/2021    Incomplete                    Value:Atrial fibrillation with rapid ventricular response with premature ventricular or aberrantly conducted complexes  Left axis deviation  Voltage criteria for left ventricular hypertrophy  ST & T wave abnormality, consider lateral ischemia  Abnormal ECG  When compared with ECG of 26-APR-2021 11:27,  Atrial fibrillation has replaced Sinus rhythm  Vent. rate has increased BY  76 BPM  ST now depressed in Lateral leads       Hold Specimen 09/01/2021 JIC   Final     Hold Specimen 09/01/2021 JIC   Final     Hold Specimen 09/01/2021 JIC   Final     Hold Specimen 09/01/2021 JI   Final     Troponin I 09/01/2021 <0.015  0.000 - 0.045 ug/L Final    The 99th percentile for upper reference range is 0.045ug/L.  Troponin values in the range of 0.045 - 0.120 ug/L may be associated with risks of adverse clinical events.     Sodium 09/01/2021 136  133 - 144 mmol/L Final     Potassium 09/01/2021 3.5  3.4 - 5.3 mmol/L Final     Chloride 09/01/2021 102  94 - 109 mmol/L Final     Carbon Dioxide (CO2) 09/01/2021 26  20 - 32 mmol/L Final     Anion Gap 09/01/2021 8  3 - 14 mmol/L Final     Urea Nitrogen 09/01/2021 14  7 - 30 mg/dL Final     Creatinine 09/01/2021 1.00  0.52 - 1.04 mg/dL Final     Calcium 09/01/2021 9.2  8.5 - 10.1 mg/dL Final     Glucose 09/01/2021 167* 70 - 99 mg/dL Final     GFR  Estimate 09/01/2021 50* >60 mL/min/1.73m2 Final    As of July 11, 2021, eGFR is calculated by the CKD-EPI creatinine equation, without race adjustment. eGFR can be influenced by muscle mass, exercise, and diet. The reported eGFR is an estimation only and is only applicable if the renal function is stable.     Magnesium 09/01/2021 2.3  1.6 - 2.3 mg/dL Final     WBC Count 09/01/2021 6.3  4.0 - 11.0 10e3/uL Final     RBC Count 09/01/2021 4.00  3.80 - 5.20 10e6/uL Final     Hemoglobin 09/01/2021 12.4  11.7 - 15.7 g/dL Final     Hematocrit 09/01/2021 38.2  35.0 - 47.0 % Final     MCV 09/01/2021 96  78 - 100 fL Final     MCH 09/01/2021 31.0  26.5 - 33.0 pg Final     MCHC 09/01/2021 32.5  31.5 - 36.5 g/dL Final     RDW 09/01/2021 12.5  10.0 - 15.0 % Final     Platelet Count 09/01/2021 260  150 - 450 10e3/uL Final     % Neutrophils 09/01/2021 66  % Final     % Lymphocytes 09/01/2021 22  % Final     % Monocytes 09/01/2021 9  % Final     % Eosinophils 09/01/2021 2  % Final     % Basophils 09/01/2021 1  % Final     % Immature Granulocytes 09/01/2021 0  % Final     NRBCs per 100 WBC 09/01/2021 0  <1 /100 Final     Absolute Neutrophils 09/01/2021 4.2  1.6 - 8.3 10e3/uL Final     Absolute Lymphocytes 09/01/2021 1.4  0.8 - 5.3 10e3/uL Final     Absolute Monocytes 09/01/2021 0.5  0.0 - 1.3 10e3/uL Final     Absolute Eosinophils 09/01/2021 0.1  0.0 - 0.7 10e3/uL Final     Absolute Basophils 09/01/2021 0.0  0.0 - 0.2 10e3/uL Final     Absolute Immature Granulocytes 09/01/2021 0.0  <=0.0 10e3/uL Final     Absolute NRBCs 09/01/2021 0.0  10e3/uL Final     SARS CoV2 PCR 09/01/2021 Negative  Negative Final    NEGATIVE: SARS-CoV-2 (COVID-19) RNA not detected, presumed negative.     LVEF  09/02/2021 55-60%   Final     Sodium 09/02/2021 139  133 - 144 mmol/L Final     Potassium 09/02/2021 4.0  3.4 - 5.3 mmol/L Final     Chloride 09/02/2021 109  94 - 109 mmol/L Final     Carbon Dioxide (CO2) 09/02/2021 25  20 - 32 mmol/L Final     Anion Gap  09/02/2021 5  3 - 14 mmol/L Final     Urea Nitrogen 09/02/2021 14  7 - 30 mg/dL Final     Creatinine 09/02/2021 0.97  0.52 - 1.04 mg/dL Final     Calcium 09/02/2021 8.8  8.5 - 10.1 mg/dL Final     Glucose 09/02/2021 107* 70 - 99 mg/dL Final     GFR Estimate 09/02/2021 52* >60 mL/min/1.73m2 Final    As of July 11, 2021, eGFR is calculated by the CKD-EPI creatinine equation, without race adjustment. eGFR can be influenced by muscle mass, exercise, and diet. The reported eGFR is an estimation only and is only applicable if the renal function is stable.     Magnesium 09/02/2021 2.4* 1.6 - 2.3 mg/dL Final     Hold Specimen 09/02/2021 JIC   Final     TSH 09/02/2021 1.24  0.40 - 4.00 mU/L Final         Narrative & Impression   EXAMINATION: US THYROID, 11/26/2018 1:09 PM      COMPARISON: 12/18/2017     HISTORY: Nontoxic multinodular goiter.     Technique: Grayscale and color ultrasound imaging of the thyroid was  performed.     Findings:    Thyroid parenchyma: Heterogeneous with innumerable nodules, similar in  appearance to 12/18/2017. There remains coarse and dystrophic  appearing calcifications on the left. Multiple punctate echogenic foci  seen. There are also punctate echogenic foci likely representing  inspissated colloid as well as foci that could represent  microcalcifications, unchanged. Multiple nodules demonstrate  hypervascularity, stable.     The right lobe of the thyroid measures: 4.4 x 3.2 x 5.1 cm     The thyroid isthmus measures: 1.6 cm     The left lobe of the thyroid measures: 2.7 x 3 x 5.4 cm                                                                       Impression:  Enlarged heterogeneous thyroid gland with innumerable nodules which  are solid and cystic. There are coarse calcifications as well as  echogenic punctate foci likely inspissated colloid with possible  microcalcifications. No significant interval change from prior  ultrasound 12/18/2017.     I have personally reviewed the examination  and initial interpretation  and I agree with the findings.     CANDIS ESPAÑA MD

## 2021-10-12 NOTE — LETTER
10/12/2021       RE: Dimple Oviedo  [address]  [facility] [address]     Dear Colleague,    Thank you for referring your patient, Dimple Oviedo, to the [facility] [place] at [facility]. Please see a copy of my visit note below.        Assessment & Plan     Nontoxic multinodular goiter  On exam today she has a new nodule in her right lobe.  I will do an US to evaluate.    - US Thyroid; Future    Graves disease  TSH obtained when she was in the hospital in Sept was in target.  Clinically she is doing well. No changes in methimazole dose.   - TSH with free T4 reflex; Future    HERMAN  She has atrial fibrillation with a history of stress cardiomyopathy.  Rate appears to be well controlled today and normal TSH at time of ED visit tells me that recurrent hyperthyroidism did not precipitate her sx.  Her lungs are clear today and her edema is stable.  Her PCP increased the diuretic yesterday and she will see her cardiologist in 10 days.  I made no changes in her meds today.      Return in about 6 months (around 4/12/2022).     I spent 25 min with the patient (start 4:15 and end 4:40.  On the same day of visit I spent an additional 15 min reviewing chart, labs, ordering tests and doing documentation.    Dhara Meza MD  [facility]CR[facility] [place]    Davey Musa is a 88 year old who presents for the following health issues  accompanied by her sone:    HPI       This 88  year old woman was seen for f/u of Graves.  She first developed symptoms of hyperthyroidism in December 2017.  She was most troubled by the tremor that occurred.  She had not noticed change in her skin or hair.  She saw Dr. Rojas in the community and he started her on methimazole.  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  Because she had  received an iodine dye load when she had a coronary angiogram, we were unable to do a radioiodine scan and uptake to determine the precise cause of her hyperthyroidism.  She was maintained on methimazole until June 2017 when it was stopped.  A radioiodine uptake and scan was done in July, showed an uptake of ~ 70%, and confirmed she had Graves' disease.  She was restarted on methimazole in July 2018 and we have been adjusting the dose since then.  She has been taking 5 mg alternating with 2.5  every AM every other day.  On this dose she generally felt well.Her energy level is good.  Her appetite is fine.  She has no tremor or palpitations.  She does not feel too hot or cold.  She denies diplopia or mattering.  She doesn't think her thyroid has changed in size.    She had been having some hematochezia in the past few months and was scheduled to have a colonoscopy for w/u.  However she was found to be in A fib (not a new problem )and was sent to the ER.  She was admitted on 9/1/21 and discharged on 9/2/21. she was troponin negative and diltiazem was added. She is due to see cardiology in a couple of weeks (10/27). On 10/10 she saw her PCP because of persistent SOB.  He thought she had mild fluid overload and increased her diuretic.    Today she says she continues to have HERMAN.  She feels comfortable at rest.  She is able to do ADLs at home.  She isn't sure she is better than yesterday. She denies CP or worsening edema.   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  She is being carefully monitored for recurrence.  She is pain-free and feels very good.    Patient Active Problem List   Diagnosis     Esophageal reflux     Restless leg syndrome     heat intolerance     Goiter     Disorder of bone and cartilage     Other psoriasis     perirectal cyst     Malignant melanoma of skin of trunk, except scrotum (H)     Nontoxic multinodular goiter     Hyperlipidemia LDL goal <130      Hypertension goal BP (blood pressure) < 140/90     Urinary incontinence     Hip arthritis     Polymyalgia rheumatica (H)     High risk medication use     Shoulder pain     Impaired fasting blood sugar     Chronic bilateral low back pain without sciatica     Obesity, unspecified obesity severity, unspecified obesity type     Iron deficiency anemia, unspecified iron deficiency anemia type     NSTEMI (non-ST elevated myocardial infarction) (H)     Stress-induced cardiomyopathy     A-fib (H)     Anxiety     Chronic systolic heart failure (H)     Urothelial carcinoma (H)     Hyperthyroidism     CRESENCIO (acute kidney injury) (H)     Hydronephrosis     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)     Graves disease     Encounter for antineoplastic chemotherapy     Chemotherapy-induced neutropenia (H)     Primary localized osteoarthritis of right knee     Urothelial cancer (H)     Malignant neoplasm of overlapping sites of bladder (H)     Primary osteoarthritis of right knee     Chronic kidney disease, stage 3 (H)     Lipedematous scalp     Atrial fibrillation with rapid ventricular response (H)     Current Outpatient Medications   Medication     acetaminophen (TYLENOL) 325 MG tablet     alendronate (FOSAMAX) 70 MG tablet     bisacodyl (DULCOLAX) 5 MG EC tablet     calcium carbonate-vitamin D (OS-SHREYA) 500-400 MG-UNIT tablet     cycloSPORINE (RESTASIS) 0.05 % ophthalmic emulsion     diltiazem ER COATED BEADS (CARDIZEM CD/CARTIA XT) 120 MG 24 hr capsule     ferrous sulfate 325 (65 Fe) MG TBEC EC tablet     furosemide (LASIX) 20 MG tablet     hydrOXYzine (ATARAX) 10 MG tablet     irbesartan (AVAPRO) 300 MG tablet     levofloxacin (LEVAQUIN) 500 MG tablet     lovastatin (MEVACOR) 40 MG tablet     melatonin 5 MG tablet     methimazole (TAPAZOLE) 5 MG tablet     Omega-3 Fatty Acids (FISH OIL) 500 MG CAPS     omeprazole (PRILOSEC) 20 MG DR capsule     ondansetron (ZOFRAN-ODT) 4 MG ODT tab     oxyCODONE (ROXICODONE) 5 MG tablet      polyethylene glycol (GOLYTELY) 236 g suspension     polyethylene glycol (MIRALAX) 17 g packet     Probiotic Product (PROBIOTIC ADVANCED PO)     propranolol ER (INDERAL LA) 60 MG 24 hr capsule     rivaroxaban ANTICOAGULANT (XARELTO) 15 MG TABS tablet     rOPINIRole (REQUIP) 0.25 MG tablet     senna-docusate (SENOKOT-S/PERICOLACE) 8.6-50 MG tablet     sertraline (ZOLOFT) 100 MG tablet     traZODone (DESYREL) 50 MG tablet     Current Facility-Administered Medications   Medication     lidocaine (XYLOCAINE) 2 % external gel       Current Outpatient Medications   Medication     acetaminophen (TYLENOL) 325 MG tablet     alendronate (FOSAMAX) 70 MG tablet     bisacodyl (DULCOLAX) 5 MG EC tablet     calcium carbonate-vitamin D (OS-SHREYA) 500-400 MG-UNIT tablet     cycloSPORINE (RESTASIS) 0.05 % ophthalmic emulsion     diltiazem ER COATED BEADS (CARDIZEM CD/CARTIA XT) 120 MG 24 hr capsule     ferrous sulfate 325 (65 Fe) MG TBEC EC tablet     furosemide (LASIX) 20 MG tablet     hydrOXYzine (ATARAX) 10 MG tablet     irbesartan (AVAPRO) 300 MG tablet     levofloxacin (LEVAQUIN) 500 MG tablet     lovastatin (MEVACOR) 40 MG tablet     melatonin 5 MG tablet     methimazole (TAPAZOLE) 5 MG tablet     Omega-3 Fatty Acids (FISH OIL) 500 MG CAPS     omeprazole (PRILOSEC) 20 MG DR capsule     ondansetron (ZOFRAN-ODT) 4 MG ODT tab     oxyCODONE (ROXICODONE) 5 MG tablet     polyethylene glycol (GOLYTELY) 236 g suspension     polyethylene glycol (MIRALAX) 17 g packet     Probiotic Product (PROBIOTIC ADVANCED PO)     propranolol ER (INDERAL LA) 60 MG 24 hr capsule     rivaroxaban ANTICOAGULANT (XARELTO) 15 MG TABS tablet     rOPINIRole (REQUIP) 0.25 MG tablet     senna-docusate (SENOKOT-S/PERICOLACE) 8.6-50 MG tablet     sertraline (ZOLOFT) 100 MG tablet     traZODone (DESYREL) 50 MG tablet     Current Facility-Administered Medications   Medication     lidocaine (XYLOCAINE) 2 % external gel   BP (!) 146/84 (BP Location: Right arm, Patient  "Position: Sitting, Cuff Size: Adult Large)   Pulse 70   Ht 1.448 m (4' 9\")   Wt 78.2 kg (172 lb 6.4 oz)   BMI 37.31 kg/m       She is in NAD.  She is wearing a mask and sitting comfortably in a chair. She is able to move to exam table without difficulty.    She is without periorbital edema.  Neck - has a 1.5 x 1.5 cm soft nodule in right lower lobe.  Left lobe palpated but only about 2x normal in size without nodules.  Chest - clear  CV- Irregularly irregular rhythm at ~ 70  Ext - lymphedema to knees      Admission on 09/01/2021, Discharged on 09/02/2021   Component Date Value Ref Range Status     Ventricular Rate 09/01/2021 139  BPM Incomplete     Atrial Rate 09/01/2021 159  BPM Incomplete     QRS Duration 09/01/2021 102  ms Incomplete     QT 09/01/2021 334  ms Incomplete     QTc 09/01/2021 508  ms Incomplete     R AXIS 09/01/2021 -49  degrees Incomplete     T Axis 09/01/2021 113  degrees Incomplete     Interpretation ECG 09/01/2021    Incomplete                    Value:Atrial fibrillation with rapid ventricular response with premature ventricular or aberrantly conducted complexes  Left axis deviation  Voltage criteria for left ventricular hypertrophy  ST & T wave abnormality, consider lateral ischemia  Abnormal ECG  When compared with ECG of 26-APR-2021 11:27,  Atrial fibrillation has replaced Sinus rhythm  Vent. rate has increased BY  76 BPM  ST now depressed in Lateral leads       Hold Specimen 09/01/2021 [facility]   Final     Hold Specimen 09/01/2021 [facility]   Final     Hold Specimen 09/01/2021 [facility]   Final     Hold Specimen 09/01/2021 [facility]   Final     Troponin I 09/01/2021 <0.015  0.000 - 0.045 ug/L Final    The 99th percentile for upper reference range is 0.045ug/L.  Troponin values in the range of 0.045 - 0.120 ug/L may be associated with risks of adverse clinical events.     Sodium 09/01/2021 136  133 - 144 mmol/L Final     Potassium 09/01/2021 3.5  3.4 - 5.3 mmol/L Final     Chloride 09/01/2021 102  94 - 109 " mmol/L Final     Carbon Dioxide (CO2) 09/01/2021 26  20 - 32 mmol/L Final     Anion Gap 09/01/2021 8  3 - 14 mmol/L Final     Urea Nitrogen 09/01/2021 14  7 - 30 mg/dL Final     Creatinine 09/01/2021 1.00  0.52 - 1.04 mg/dL Final     Calcium 09/01/2021 9.2  8.5 - 10.1 mg/dL Final     Glucose 09/01/2021 167* 70 - 99 mg/dL Final     GFR Estimate 09/01/2021 50* >60 mL/min/1.73m2 Final    As of July 11, 2021, eGFR is calculated by the CKD-EPI creatinine equation, without race adjustment. eGFR can be influenced by muscle mass, exercise, and diet. The reported eGFR is an estimation only and is only applicable if the renal function is stable.     Magnesium 09/01/2021 2.3  1.6 - 2.3 mg/dL Final     WBC Count 09/01/2021 6.3  4.0 - 11.0 10e3/uL Final     RBC Count 09/01/2021 4.00  3.80 - 5.20 10e6/uL Final     Hemoglobin 09/01/2021 12.4  11.7 - 15.7 g/dL Final     Hematocrit 09/01/2021 38.2  35.0 - 47.0 % Final     MCV 09/01/2021 96  78 - 100 fL Final     MCH 09/01/2021 31.0  26.5 - 33.0 pg Final     MCHC 09/01/2021 32.5  31.5 - 36.5 g/dL Final     RDW 09/01/2021 12.5  10.0 - 15.0 % Final     Platelet Count 09/01/2021 260  150 - 450 10e3/uL Final     % Neutrophils 09/01/2021 66  % Final     % Lymphocytes 09/01/2021 22  % Final     % Monocytes 09/01/2021 9  % Final     % Eosinophils 09/01/2021 2  % Final     % Basophils 09/01/2021 1  % Final     % Immature Granulocytes 09/01/2021 0  % Final     NRBCs per 100 WBC 09/01/2021 0  <1 /100 Final     Absolute Neutrophils 09/01/2021 4.2  1.6 - 8.3 10e3/uL Final     Absolute Lymphocytes 09/01/2021 1.4  0.8 - 5.3 10e3/uL Final     Absolute Monocytes 09/01/2021 0.5  0.0 - 1.3 10e3/uL Final     Absolute Eosinophils 09/01/2021 0.1  0.0 - 0.7 10e3/uL Final     Absolute Basophils 09/01/2021 0.0  0.0 - 0.2 10e3/uL Final     Absolute Immature Granulocytes 09/01/2021 0.0  <=0.0 10e3/uL Final     Absolute NRBCs 09/01/2021 0.0  10e3/uL Final     SARS CoV2 PCR 09/01/2021 Negative  Negative Final     NEGATIVE: SARS-CoV-2 (COVID-19) RNA not detected, presumed negative.     LVEF  09/02/2021 55-60%   Final     Sodium 09/02/2021 139  133 - 144 mmol/L Final     Potassium 09/02/2021 4.0  3.4 - 5.3 mmol/L Final     Chloride 09/02/2021 109  94 - 109 mmol/L Final     Carbon Dioxide (CO2) 09/02/2021 25  20 - 32 mmol/L Final     Anion Gap 09/02/2021 5  3 - 14 mmol/L Final     Urea Nitrogen 09/02/2021 14  7 - 30 mg/dL Final     Creatinine 09/02/2021 0.97  0.52 - 1.04 mg/dL Final     Calcium 09/02/2021 8.8  8.5 - 10.1 mg/dL Final     Glucose 09/02/2021 107* 70 - 99 mg/dL Final     GFR Estimate 09/02/2021 52* >60 mL/min/1.73m2 Final    As of July 11, 2021, eGFR is calculated by the CKD-EPI creatinine equation, without race adjustment. eGFR can be influenced by muscle mass, exercise, and diet. The reported eGFR is an estimation only and is only applicable if the renal function is stable.     Magnesium 09/02/2021 2.4* 1.6 - 2.3 mg/dL Final     Hold Specimen 09/02/2021 [facility]   Final     TSH 09/02/2021 1.24  0.40 - 4.00 mU/L Final         Narrative & Impression   EXAMINATION: US THYROID, 11/26/2018 1:09 PM      COMPARISON: 12/18/2017     HISTORY: Nontoxic multinodular goiter.     Technique: Grayscale and color ultrasound imaging of the thyroid was  performed.     Findings:    Thyroid parenchyma: Heterogeneous with innumerable nodules, similar in  appearance to 12/18/2017. There remains coarse and dystrophic  appearing calcifications on the left. Multiple punctate echogenic foci  seen. There are also punctate echogenic foci likely representing  inspissated colloid as well as foci that could represent  microcalcifications, unchanged. Multiple nodules demonstrate  hypervascularity, stable.     The right lobe of the thyroid measures: 4.4 x 3.2 x 5.1 cm     The thyroid isthmus measures: 1.6 cm     The left lobe of the thyroid measures: 2.7 x 3 x 5.4 cm                                                                        Impression:  Enlarged heterogeneous thyroid gland with innumerable nodules which  are solid and cystic. There are coarse calcifications as well as  echogenic punctate foci likely inspissated colloid with possible  microcalcifications. No significant interval change from prior  ultrasound 12/18/2017.     I have personally reviewed the examination and initial interpretation  and I agree with the findings.     CANDIS ESPAÑA MD

## 2021-10-13 ENCOUNTER — TELEPHONE (OUTPATIENT)
Dept: ENDOCRINOLOGY | Facility: CLINIC | Age: 86
End: 2021-10-13

## 2021-10-14 ENCOUNTER — NURSE TRIAGE (OUTPATIENT)
Dept: FAMILY MEDICINE | Facility: CLINIC | Age: 86
End: 2021-10-14

## 2021-10-14 RX ORDER — IRBESARTAN 300 MG/1
TABLET ORAL
Qty: 90 TABLET | Refills: 2 | Status: SHIPPED | OUTPATIENT
Start: 2021-10-14 | End: 2022-06-09

## 2021-10-14 RX ORDER — LOVASTATIN 40 MG
TABLET ORAL
Qty: 90 TABLET | Refills: 0 | Status: SHIPPED | OUTPATIENT
Start: 2021-10-14 | End: 2021-12-20

## 2021-10-14 NOTE — TELEPHONE ENCOUNTER
Call received from patient  1. Bloody urine today  2. Urinated three times today and blood present with each urination  3. Small little clots noticed  4. Urine is pink in color  5. Toilet bowl looked red with each urination  6. No dysuria  7. No fever  8. At 10/12 Urgent Care visit, Lasix increased from 20 mg daily to 20 mg BID    A. Should she continue BID dosing?  9. Takes Xarelto    Writer notes history of Urothelial cancer in 2018.    Writer huddled with Dr. Denise who recommends patient continue medications as directed and contact her Urology clinic.  Will likely need cystoscopy.    Writer notes patient had an Oncology visit on 8/17/21 and a Urology visit on 8/13/21.    Writer called patient and reviewed recommendation per Dr. Denise.  Patient verbalized understanding and in agreement with plan.    [facility] [phone].    Patient verbalized understanding and in agreement with plan.      Reason for Disposition    Taking Coumadin (warfarin) or other strong blood thinner, or known bleeding disorder (e.g., thrombocytopenia)    Additional Information    Negative: Shock suspected (e.g., cold/pale/clammy skin, too weak to stand, low BP, rapid pulse)    Negative: Sounds like a life-threatening emergency to the triager    Negative: Urinary catheter, questions about    Negative: Recent back or abdominal injury    Negative: Recent genital injury    Negative: Unable to urinate (or only a few drops) > 4 hours and bladder feels very full (e.g., palpable bladder or strong urge to urinate)    Negative: Passing pure blood or large blood clots (i.e., larger than a dime or grape)    Negative: Fever > 100.4 F (38.0 C)    Negative: Patient sounds very sick or weak to the triager    Negative: Known sickle cell disease    Protocols used: URINE - BLOOD IN-A-OH    SHASHANK MurrellN, RN  [facility]

## 2021-10-14 NOTE — TELEPHONE ENCOUNTER
Routing refill request to provider for review/approval because:  --Last blood pressure not quite at goal in endocrine visit.  --Labs not current:  LDL 10/4/19.        ,  --Please contact patient and ask to schedule a lab-only for annual cholesterol check.    --future lab order in place.  --A refill request for medication was sent to the provider.          --Last visit:  9/10/2021 virtual with [facility] ED follow up.             BP Readings from Last 6 Encounters:   10/12/21 (!) 146/84   10/10/21 129/84   09/02/21 130/89   09/01/21 132/75   08/23/21 130/72   08/17/21 (!) 158/73

## 2021-10-25 ENCOUNTER — TELEPHONE (OUTPATIENT)
Dept: FAMILY MEDICINE | Facility: CLINIC | Age: 86
End: 2021-10-25

## 2021-10-25 NOTE — TELEPHONE ENCOUNTER
RX FOR diltiazem ER COATED BEADS (CARDIZEM CD/CARTIA XT) 120 MG 24 hr capsule AND  lovastatin (MEVACOR) 40 MG tablet.  THIS COMBINATION MAY INCREASE RISK OF MYOPHATHY. PLEASE CONFIRM THAT YOU ARE AWARE OF THIS INTERACTION AND OK TO FILL BOTH

## 2021-10-26 ENCOUNTER — TELEPHONE (OUTPATIENT)
Dept: FAMILY MEDICINE | Facility: CLINIC | Age: 86
End: 2021-10-26

## 2021-10-26 NOTE — TELEPHONE ENCOUNTER
Pt calling to request a refill of her lovastatin.  Informed we sent a 90 day supply on 10/14/21.  Noticed on refill comments pt is also due for a lab only appt for lipid panel - scheduled pt for 11/3/21.    Talia Mckinney RN  [facility]

## 2021-10-26 NOTE — TELEPHONE ENCOUNTER
Called [facility] mail order pharmacy.  Gave them this message.  ATUL Lee

## 2021-10-27 ENCOUNTER — OFFICE VISIT (OUTPATIENT)
Dept: CARDIOLOGY | Facility: CLINIC | Age: 86
End: 2021-10-27
Attending: INTERNAL MEDICINE
Payer: MEDICARE

## 2021-10-27 ENCOUNTER — ANCILLARY PROCEDURE (OUTPATIENT)
Dept: ULTRASOUND IMAGING | Facility: CLINIC | Age: 86
End: 2021-10-27
Attending: INTERNAL MEDICINE
Payer: MEDICARE

## 2021-10-27 VITALS
HEIGHT: 57 IN | OXYGEN SATURATION: 96 % | DIASTOLIC BLOOD PRESSURE: 68 MMHG | BODY MASS INDEX: 37.11 KG/M2 | WEIGHT: 172 LBS | SYSTOLIC BLOOD PRESSURE: 116 MMHG | HEART RATE: 112 BPM

## 2021-10-27 DIAGNOSIS — I48.20 CHRONIC ATRIAL FIBRILLATION (H): Primary | ICD-10-CM

## 2021-10-27 DIAGNOSIS — I89.0 LYMPHEDEMA: ICD-10-CM

## 2021-10-27 DIAGNOSIS — E04.2 NONTOXIC MULTINODULAR GOITER: ICD-10-CM

## 2021-10-27 PROCEDURE — G0463 HOSPITAL OUTPT CLINIC VISIT: HCPCS | Mod: 25

## 2021-10-27 PROCEDURE — 93005 ELECTROCARDIOGRAM TRACING: CPT

## 2021-10-27 PROCEDURE — 76536 US EXAM OF HEAD AND NECK: CPT | Mod: GC | Performed by: RADIOLOGY

## 2021-10-27 PROCEDURE — 99215 OFFICE O/P EST HI 40 MIN: CPT | Performed by: INTERNAL MEDICINE

## 2021-10-27 RX ORDER — DILTIAZEM HYDROCHLORIDE 180 MG/1
180 CAPSULE, EXTENDED RELEASE ORAL DAILY
Qty: 90 CAPSULE | Refills: 0 | Status: SHIPPED | OUTPATIENT
Start: 2021-10-27 | End: 2023-01-03

## 2021-10-27 RX ORDER — DILTIAZEM HYDROCHLORIDE 180 MG/1
180 CAPSULE, EXTENDED RELEASE ORAL DAILY
Qty: 90 CAPSULE | Refills: 3 | Status: SHIPPED | OUTPATIENT
Start: 2021-10-27 | End: 2021-10-27

## 2021-10-27 ASSESSMENT — PAIN SCALES - GENERAL: PAINLEVEL: NO PAIN (0)

## 2021-10-27 ASSESSMENT — MIFFLIN-ST. JEOR: SCORE: 1084.07

## 2021-10-27 NOTE — PROGRESS NOTES
HPI:   Mrs. Oviedo is a very pleasant 88-year-old woman who has a complex past medical history including bladder malignancy, kidney removal, and hypertension, GERD and history of atrial fibrillation with rapid rates.  At one point she also had a stress induced cardiomyopathy with diminished ejection fraction but that has resolved.    Patient is here with her son.  Mainly she has been having some exertional intolerance.  The bases is unlikely to be heart failure inasmuch as her weight seems to be quite stable.  Peripheral examination is difficult due to lymphedema.  It is hard also to examine her jugular veins.  Chest was clear today.  However she does have COPD with poor air entry bilaterally.     Most recent echo in September of this year is summarized below and shows normalization of ejection fraction.    Echo 9/21     Procedure  Echocardiogram with two-dimensional, color and spectral Doppler performed. The  patient's rhythm is atrial fibrillation.  The patient's rhythm is atrial fibrillation.  Global and regional left ventricular function is normal with an EF of 55-60%.  RV is not well seen, fxn is probably normal to mildly reduced.  No significant valvular abnormalities present.  IVC diameter and respiratory changes fall into an intermediate range  suggesting an RA pressure of 8 mmHg.  No pericardial effusion is present.  There has been no change.  IVC not enlarged excessively  ________________________  ______________________________________________________  Patient was in the ED recently and her diuretic was increased.  There was concern about atrial for with rapid rate.  ECG today shows at rest heart rate of 97 in atrial fibrillation .  We will increase her diltiazem to 180 mg daily.  I reviewed these changes with Mrs. Oviedo and her son.  Her medications are often handled by her daughter who provides organization for her daily medications.  PAST MEDICAL HISTORY:  Past Medical History:   Diagnosis Date      Arthritis      Calculus of kidney      Chemotherapy-induced neutropenia (H) 3/6/2019     Congestive heart failure (H)      Esophageal reflux      GERD (gastroesophageal reflux disease)      Hyperlipidemia LDL goal <130 5/9/2010     Malignant melanoma of skin of trunk, except scrotum (H)      Nonspecific abnormal finding     has living will 2004 -      Nontoxic multinodular goiter     no further eval /tx rec per pt     Osteopenia      Other psoriasis      Personal history of colonic polyps      PMR (polymyalgia rheumatica) (H)      Restless legs syndrome 7/11/2018     Stress-induced cardiomyopathy      Undiagnosed cardiac murmurs      Unspecified constipation      Unspecified essential hypertension      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 12 tablet 3     bisacodyl (DULCOLAX) 5 MG EC tablet Take 2 tablets by mouth at bedtime two days prior to procedure.  Take 2 tabs by mouth at 3pm one day prior to procedure. 4 tablet 0     diltiazem ER COATED BEADS (CARDIZEM CD/CARTIA XT) 120 MG 24 hr capsule Take 1 capsule (120 mg) by mouth daily 30 capsule 0     ferrous sulfate 325 (65 Fe) MG TBEC EC tablet Take 325 mg by mouth every morning        furosemide (LASIX) 20 MG tablet Take 1 tablet (20 mg) by mouth 2 times daily .  Take in the morning and after lunch. 90 tablet 1     irbesartan (AVAPRO) 300 MG tablet TAKE 1 TABLET EVERY DAY 90 tablet 2     lovastatin (MEVACOR) 40 MG tablet TAKE 1 TABLET AT BEDTIME 90 tablet 0     methimazole (TAPAZOLE) 5 MG tablet Take 5 mg alternating with 2.5 mg every other day 90 tablet 3     Omega-3 Fatty Acids (FISH OIL) 500 MG CAPS Take 1 capsule by mouth every morning        omeprazole (PRILOSEC) 20 MG DR capsule TAKE 1 CAPSULE EVERY DAY 90 capsule 1     ondansetron (ZOFRAN-ODT) 4 MG ODT tab  Take 1 tablet (4 mg) by mouth every 6 hours as needed for nausea or vomiting 20 tablet 0     polyethylene glycol (MIRALAX) 17 g packet Take 17 g by mouth daily (Patient taking differently: Take 17 g by mouth daily as needed ) 10 packet 0     propranolol ER (INDERAL LA) 60 MG 24 hr capsule Take 1 capsule (60 mg) by mouth daily 90 capsule 2     rivaroxaban ANTICOAGULANT (XARELTO) 15 MG TABS tablet Take 1 tablet (15 mg) by mouth daily (with dinner) 90 tablet 3     rOPINIRole (REQUIP) 0.25 MG tablet TAKE 1 TABLET IN THE AFTERNOON AND TAKE 2 TABLETS EVERY NIGHT 270 tablet 3     sertraline (ZOLOFT) 100 MG tablet TAKE 1 TABLET EVERY MORNING 90 tablet 0     traZODone (DESYREL) 50 MG tablet TAKE 1/2 TABLET AT BEDTIME 45 tablet 3     acetaminophen (TYLENOL) 325 MG tablet Take 3 tablets (975 mg) by mouth every 8 hours as needed for pain (Patient not taking: Reported on 10/27/2021) 1 Bottle 0     calcium carbonate-vitamin D (OS-SHREYA) 500-400 MG-UNIT tablet Take 1 tablet by mouth daily (Patient not taking: Reported on 10/27/2021)       cycloSPORINE (RESTASIS) 0.05 % ophthalmic emulsion Place 1 drop into both eyes 2 times daily (Patient not taking: Reported on 10/27/2021)       hydrOXYzine (ATARAX) 10 MG tablet Take 1 tablet (10 mg) by mouth every 6 hours as needed for itching or other (adjuvant pain) (Patient not taking: Reported on 10/27/2021) 10 tablet 0     levofloxacin (LEVAQUIN) 500 MG tablet Take one tablet 6 hours after treatment and one tablet the following morning after treatment. (Patient not taking: Reported on 10/27/2021) 6 tablet 11     melatonin 5 MG tablet Take 5 mg by mouth At Bedtime  (Patient not taking: Reported on 10/27/2021)       oxyCODONE (ROXICODONE) 5 MG tablet Take 1 tablet (5 mg) by mouth every 4 hours as needed for moderate to severe pain (Patient not taking: Reported on 10/27/2021) 20 tablet 0     polyethylene glycol (GOLYTELY) 236 g suspension Take as directed in patient instructions.  One day before  exam fill jug with powder and one gallon of water.  At 6:00pm drink 8oz glass of mixture every 15 min until the jug is half empty.  Store the rest in the refrigerator.  Day of exam: 6 hours prior to exam drink the other half of mixture. (Patient not taking: Reported on 10/27/2021) 4000 mL 0     Probiotic Product (PROBIOTIC ADVANCED PO) Take 1 capsule by mouth every morning  (Patient not taking: Reported on 10/27/2021)       senna-docusate (SENOKOT-S/PERICOLACE) 8.6-50 MG tablet Take 1-2 tablets by mouth 2 times daily as needed for constipation (Patient not taking: Reported on 10/27/2021) 30 tablet 0       PAST SURGICAL HISTORY:  Past Surgical History:   Procedure Laterality Date     ARTHROPLASTY KNEE Right 11/9/2020    Procedure: ARTHROPLASTY, KNEE, TOTAL, RIGHT;  Surgeon: Edward Otero MD;  [facility]     BIOPSY       COLONOSCOPY  2014     COMBINED CYSTOSCOPY, INSERT STENT URETER(S) Bilateral 9/12/2018    Procedure: COMBINED CYSTOSCOPY, INSERT STENT URETER(S);  Cystoscopy Bilateral ureteral Stent Placement.;  Surgeon: Justin Henry MD;  [facility]     COMBINED CYSTOSCOPY, RETROGRADES, URETEROSCOPY, INSERT STENT Bilateral 4/3/2018    Procedure: COMBINED CYSTOSCOPY, RETROGRADES, URETEROSCOPY, INSERT STENT;;  Surgeon: Stuart King MD;  [facility]     COMBINED CYSTOSCOPY, RETROGRADES, URETEROSCOPY, INSERT STENT Bilateral 9/10/2018    Procedure: COMBINED CYSTOSCOPY, RETROGRADES, URETEROSCOPY, INSERT STENT;  Cystoscopy, Bilateral Ureteroscopy, Bladder Biopsies, Retrogram Pyelograms, Ureteral Washings and brushings, cysview;  Surgeon: Stuart King MD;  [facility]     COMBINED CYSTOSCOPY, RETROGRADES, URETEROSCOPY, INSERT STENT Left 8/26/2020    Procedure: Cystoscopy, Left Ureteral Wash, Left ureteral Retrograde Pyelogram;  Surgeon: Justin Henry MD;  [facility]     CYSTOSCOPY, BIOPSY BLADDER INSTILL OPTICAL AGENT N/A 4/3/2018    Procedure:  CYSTOSCOPY, BIOPSY BLADDER INSTILL OPTICAL AGENT;  Cystoscopy, Blue Light Cystoscopy, Bladder Biopsies, Bilateral Selective ureteral washings for Cytology, Bilateral Retrograde Pyelograms, Bilateral Ureteroscopy;  Surgeon: Stuart King MD;  [facility]     CYSTOSCOPY, BIOPSY BLADDER, COMBINED N/A 2/19/2018    Procedure: COMBINED CYSTOSCOPY, BIOPSY BLADDER;  Cystoscopy, Bladder Biopsy;  Surgeon: Kenna La MD;  [facility]     CYSTOSCOPY, BIOPSY BLADDER, COMBINED N/A 8/26/2020    Procedure: Cystoscopy, biopsy bladder, combined;  Surgeon: Justin Henry MD;  [facility]     CYSTOSCOPY, FULGURATE BLADDER TUMOR, COMBINED N/A 1/13/2020    Procedure: CYSTOSCOPY, WITH  bladder biopsies and fulguration;  Surgeon: Stuart King MD;  [facility]     CYSTOSCOPY, REMOVE STENT(S), COMBINED  11/13/2018    Procedure: Flexible Cystoscopy with Stent Removal;  Surgeon: Stuart King MD;  [facility]     DAVINCI LYMPHADENECTOMY N/A 11/13/2018    Procedure: Davinci Lymphadenectomy ;  Surgeon: Stuart King MD;  [facility]     DAVINCI NEPHROURETERECTOMY N/A 11/13/2018    Procedure: Right DaVinci Assisted Nephroureterectomy;  Surgeon: Stuart King MD;  [facility]     ENDOSCOPIC ULTRASOUND LOWER GASTROINTESTIONAL TRACT (GI) N/A 10/30/2015    Procedure: ENDOSCOPIC ULTRASOUND LOWER GASTROINTESTIONAL TRACT (GI);  Surgeon: Daniel Jean-Baptiste MD;  [facility]     EYE SURGERY  12/4/17     INSERT PORT VASCULAR ACCESS Right 12/19/2018    Procedure: Chest Port Placement - right;  Surgeon: Stuart Chavez PA-C;  [facility]     IR CHEST PORT PLACEMENT > 5 YRS OF AGE  12/19/2018     IR PORT REMOVAL RIGHT  6/26/2019     LAPAROSCOPIC CHOLECYSTECTOMY WITH CHOLANGIOGRAMS N/A 11/1/2015    Procedure: LAPAROSCOPIC CHOLECYSTECTOMY WITH CHOLANGIOGRAMS;  Surgeon: Tonie Warren MD;  [facility]     REMOVE PORT  "VASCULAR ACCESS Right 6/26/2019    Procedure: Right Port Removal;  Surgeon: Froilan Irizarry PA-C;  [facility]     SURGICAL HISTORY OF -   1996    malignant melanoma     SURGICAL HISTORY OF -   1968    thyroid nodule     SURGICAL HISTORY OF -       D & C     ZZC NONSPECIFIC PROCEDURE  2005    colonoscopy polyp repeat 2010       ALLERGIES:     Allergies   Allergen Reactions     Codeine        FAMILY HISTORY:  Family History   Problem Relation Age of Onset     Cancer Father         dec - esophageal and laryngeal     Heart Disease Mother      Respiratory Mother         dec     Breast Cancer Daughter      Other Cancer Daughter      Thyroid Disease Daughter      Asthma Daughter      Hyperlipidemia Son      Diabetes Son      Anesthesia Reaction No family hx of      Deep Vein Thrombosis (DVT) No family hx of          SOCIAL HISTORY:  Social History     Tobacco Use     Smoking status: Never Smoker     Smokeless tobacco: Never Used   Vaping Use     Vaping Use: Never used   Substance Use Topics     Alcohol use: No     Alcohol/week: 0.0 standard drinks     Comment: rare     Drug use: No       ROS:   Constitutional: No fever, chills, or sweats. Weight stable.   ENT: No visual disturbance, ear ache, epistaxis, sore throat.   Cardiovascular: As per HPI.   Respiratory: No cough, hemoptysis.    GI: No nausea, vomiting, .   : No hematuria.   Integument: Negative.   Psychiatric: Negative.   Hematologic: no easy bleeding.  Neuro: Negative.   Endocrinology: No significant heat or cold intolerance   Musculoskeletal: No myalgia.    Exam:  /68 (BP Location: Left arm, Patient Position: Chair, Cuff Size: Adult Regular)   Pulse 112   Ht 1.448 m (4' 9\")   Wt 78 kg (172 lb)   SpO2 96%   BMI 37.22 kg/m    GENERAL APPEARANCE: Elderly moderately obese but otherwise , alert and no distress  HEENT: no icterus, no xanthelasmas, normal pupil size and reaction, normal palate, mucosa moist, no central cyanosis  NECK: no adenopathy, " no asymmetry, masses, or scars, thyroid normal to palpation and no bruits, JVP not elevated  RESPIRATORY: lungs clear to auscultation - no rales, rhonchi or wheezes, no use of accessory muscles, no retractions, respirations are unlabored, normal respiratory rate  CARDIOVASCULAR: regular rhythm, normal S1 with physiologic split S2, no S3 or S4 and no murmur, click or rub, precordium quiet with normal PMI.  ABDOMEN: soft, non tender, without hepatosplenomegaly, no masses palpable, bowel sounds normal, aorta not enlarged by palpation, no abdominal bruits  EXTREMITIES: peripheral pulses normal, no edema, no bruits  NEURO: alert and oriented to person/place/time, normal speech, gait and affect  SKIN: no ecchymoses, no rashes    Labs:  CBC RESULTS:   Lab Results   Component Value Date    WBC 6.3 09/01/2021    WBC 7.1 05/11/2021    RBC 4.00 09/01/2021    RBC 4.00 05/11/2021    HGB 12.4 09/01/2021    HGB 12.6 05/11/2021    HCT 38.2 09/01/2021    HCT 39.2 05/11/2021    MCV 96 09/01/2021    MCV 98 05/11/2021    MCH 31.0 09/01/2021    MCH 31.5 05/11/2021    MCHC 32.5 09/01/2021    MCHC 32.1 05/11/2021    RDW 12.5 09/01/2021    RDW 13.6 05/11/2021     09/01/2021     05/11/2021       BMP RESULTS:  Lab Results   Component Value Date     09/02/2021     05/11/2021    POTASSIUM 4.0 09/02/2021    POTASSIUM 4.2 05/11/2021    CHLORIDE 109 09/02/2021    CHLORIDE 101 05/11/2021    CO2 25 09/02/2021    CO2 29 05/11/2021    ANIONGAP 5 09/02/2021    ANIONGAP 8 05/11/2021     (H) 09/02/2021     (H) 05/11/2021    BUN 14 09/02/2021    BUN 24 05/11/2021    CR 0.97 09/02/2021    CR 1.04 05/11/2021    GFRESTIMATED 52 (L) 09/02/2021    GFRESTIMATED 50 (L) 08/11/2021    GFRESTIMATED 48 (L) 05/11/2021    GFRESTBLACK 56 (L) 05/11/2021    SHREYA 8.8 09/02/2021    SHREYA 9.3 05/11/2021        INR RESULTS:  Lab Results   Component Value Date    INR 0.95 07/20/2020    INR 0.99 06/26/2019    INR 0.97 02/07/2019    INR  0.91 12/19/2018       Procedures:  PULMONARY FUNCTION TESTS:   No flowsheet data found.      ECHOCARDIOGRAM:   No results found for this or any previous visit (from the past 8760 hour(s)).      Assessment and Plan:   1.  Permanent atrial fibrillation with tendency to relatively rapid ventricular response-increase diltiazem dose today.  She will remain on moderate dose of propranolol which is probably as much as she will tolerate given her pulmonary status  2.  Discussed changes in medication and will provide prescriptions for Xarelto refill and for the increase diltiazem    Plan  1.  Meds as discussed above  2.  Follow-up in 1 year or earlier if needed    Total elapsed time today with chart review, face-to-face and documentation 40 minutes    I very much appreciated the opportunity to see and assess Dimple Oviedo in the clinic  Please do not hesitate to contact my office if you have any questions or concerns.      Butch Griffith MD  Cardiac Arrhythmia Service  [facility] [place]  [phone]    BUT[facility]IMS

## 2021-10-27 NOTE — NURSING NOTE
Chief Complaint   Patient presents with     Follow Up     4 mo f/u for Afib RVR, recent ED visit 9/2, started diltiazem 120 for rate control.      Vitals were taken and medications were reconciled.   Jesus Sanchez, EMT  2:06 PM

## 2021-10-27 NOTE — PATIENT INSTRUCTIONS
You were seen in the Electrophysiology Clinic today by: Dr Griffith    Plan:     Medication Changes:     INCREASE Diltiazem to 180mg daily      Follow up visit:    1 year with Dr Griffith          Your Care Team:  [facility] Cardiology   Telephone Number     Nurse Line  Lianna Carlisle RN  [phone]     For scheduling appts or procedures:    Lenora Ramos   [phone]   For the Device Clinic (Pacemakers, ICDs, Loop Recorders)    During business hours: [phone]  After business hours:   [phone]- select option 4 and ask for job code [id number].     On-call cardiologist for after hours or on weekends: [phone], option #4, and ask to speak to the on-call cardiologist.     Cardiovascular Clinic:   [address] [place] [address]. [place] [address]      As always, Thank you for trusting us with your health care needs!

## 2021-10-27 NOTE — LETTER
10/27/2021      RE: Dimpel Oviedo  [address]  [facility] [address]       Dear Colleague,    Thank you for the opportunity to participate in the care of your patient, Dimple Oviedo, at the [facility]. Please see a copy of my visit note below.    HPI:   Mrs. Oviedo is a very pleasant 88-year-old woman who has a complex past medical history including bladder malignancy, kidney removal, and hypertension, GERD and history of atrial fibrillation with rapid rates.  At one point she also had a stress induced cardiomyopathy with diminished ejection fraction but that has resolved.    Patient is here with her son.  Mainly she has been having some exertional intolerance.  The bases is unlikely to be heart failure inasmuch as her weight seems to be quite stable.  Peripheral examination is difficult due to lymphedema.  It is hard also to examine her jugular veins.  Chest was clear today.  However she does have COPD with poor air entry bilaterally.     Most recent echo in September of this year is summarized below and shows normalization of ejection fraction.    Echo 9/21     Procedure  Echocardiogram with two-dimensional, color and spectral Doppler performed. The  patient's rhythm is atrial fibrillation.  The patient's rhythm is atrial fibrillation.  Global and regional left ventricular function is normal with an EF of 55-60%.  RV is not well seen, fxn is probably normal to mildly reduced.  No significant valvular abnormalities present.  IVC diameter and respiratory changes fall into an intermediate range  suggesting an RA pressure of 8 mmHg.  No pericardial effusion is present.  There has been no change.  IVC not enlarged excessively  ________________________  ______________________________________________________  Patient was in the ED recently and her diuretic was increased.  There was concern about atrial for with  rapid rate.  ECG today shows at rest heart rate of 97 in atrial fibrillation .  We will increase her diltiazem to 180 mg daily.  I reviewed these changes with Mrs. Oviedo and her son.  Her medications are often handled by her daughter who provides organization for her daily medications.    PAST MEDICAL HISTORY:  Past Medical History:   Diagnosis Date     Arthritis      Calculus of kidney      Chemotherapy-induced neutropenia (H) 3/6/2019     Congestive heart failure (H)      Esophageal reflux      GERD (gastroesophageal reflux disease)      Hyperlipidemia LDL goal <130 5/9/2010     Malignant melanoma of skin of trunk, except scrotum (H)      Nonspecific abnormal finding     has living will 2004 -      Nontoxic multinodular goiter     no further eval /tx rec per pt     Osteopenia      Other psoriasis      Personal history of colonic polyps      PMR (polymyalgia rheumatica) (H)      Restless legs syndrome 7/11/2018     Stress-induced cardiomyopathy      Undiagnosed cardiac murmurs      Unspecified constipation      Unspecified essential hypertension      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 12 tablet 3     bisacodyl (DULCOLAX) 5 MG EC tablet Take 2 tablets by mouth at bedtime two days prior to procedure.  Take 2 tabs by mouth at 3pm one day prior to procedure. 4 tablet 0     diltiazem ER COATED BEADS (CARDIZEM CD/CARTIA XT) 120 MG 24 hr capsule Take 1 capsule (120 mg) by mouth daily 30 capsule 0     ferrous sulfate 325 (65 Fe) MG TBEC EC tablet Take 325 mg by mouth every morning        furosemide (LASIX) 20 MG tablet Take 1 tablet (20 mg) by mouth 2 times daily .  Take in the morning and after lunch. 90 tablet 1     irbesartan (AVAPRO) 300 MG tablet TAKE 1 TABLET EVERY DAY 90 tablet 2     lovastatin (MEVACOR) 40 MG  tablet TAKE 1 TABLET AT BEDTIME 90 tablet 0     methimazole (TAPAZOLE) 5 MG tablet Take 5 mg alternating with 2.5 mg every other day 90 tablet 3     Omega-3 Fatty Acids (FISH OIL) 500 MG CAPS Take 1 capsule by mouth every morning        omeprazole (PRILOSEC) 20 MG DR capsule TAKE 1 CAPSULE EVERY DAY 90 capsule 1     ondansetron (ZOFRAN-ODT) 4 MG ODT tab Take 1 tablet (4 mg) by mouth every 6 hours as needed for nausea or vomiting 20 tablet 0     polyethylene glycol (MIRALAX) 17 g packet Take 17 g by mouth daily (Patient taking differently: Take 17 g by mouth daily as needed ) 10 packet 0     propranolol ER (INDERAL LA) 60 MG 24 hr capsule Take 1 capsule (60 mg) by mouth daily 90 capsule 2     rivaroxaban ANTICOAGULANT (XARELTO) 15 MG TABS tablet Take 1 tablet (15 mg) by mouth daily (with dinner) 90 tablet 3     rOPINIRole (REQUIP) 0.25 MG tablet TAKE 1 TABLET IN THE AFTERNOON AND TAKE 2 TABLETS EVERY NIGHT 270 tablet 3     sertraline (ZOLOFT) 100 MG tablet TAKE 1 TABLET EVERY MORNING 90 tablet 0     traZODone (DESYREL) 50 MG tablet TAKE 1/2 TABLET AT BEDTIME 45 tablet 3     acetaminophen (TYLENOL) 325 MG tablet Take 3 tablets (975 mg) by mouth every 8 hours as needed for pain (Patient not taking: Reported on 10/27/2021) 1 Bottle 0     calcium carbonate-vitamin D (OS-SHREYA) 500-400 MG-UNIT tablet Take 1 tablet by mouth daily (Patient not taking: Reported on 10/27/2021)       cycloSPORINE (RESTASIS) 0.05 % ophthalmic emulsion Place 1 drop into both eyes 2 times daily (Patient not taking: Reported on 10/27/2021)       hydrOXYzine (ATARAX) 10 MG tablet Take 1 tablet (10 mg) by mouth every 6 hours as needed for itching or other (adjuvant pain) (Patient not taking: Reported on 10/27/2021) 10 tablet 0     levofloxacin (LEVAQUIN) 500 MG tablet Take one tablet 6 hours after treatment and one tablet the following morning after treatment. (Patient not taking: Reported on 10/27/2021) 6 tablet 11     melatonin 5 MG tablet Take 5 mg  by mouth At Bedtime  (Patient not taking: Reported on 10/27/2021)       oxyCODONE (ROXICODONE) 5 MG tablet Take 1 tablet (5 mg) by mouth every 4 hours as needed for moderate to severe pain (Patient not taking: Reported on 10/27/2021) 20 tablet 0     polyethylene glycol (GOLYTELY) 236 g suspension Take as directed in patient instructions.  One day before exam fill jug with powder and one gallon of water.  At 6:00pm drink 8oz glass of mixture every 15 min until the jug is half empty.  Store the rest in the refrigerator.  Day of exam: 6 hours prior to exam drink the other half of mixture. (Patient not taking: Reported on 10/27/2021) 4000 mL 0     Probiotic Product (PROBIOTIC ADVANCED PO) Take 1 capsule by mouth every morning  (Patient not taking: Reported on 10/27/2021)       senna-docusate (SENOKOT-S/PERICOLACE) 8.6-50 MG tablet Take 1-2 tablets by mouth 2 times daily as needed for constipation (Patient not taking: Reported on 10/27/2021) 30 tablet 0   PAST SURGICAL HISTORY:  Past Surgical History:   Procedure Laterality Date     ARTHROPLASTY KNEE Right 11/9/2020    Procedure: ARTHROPLASTY, KNEE, TOTAL, RIGHT;  Surgeon: Edward Otero MD;  [facility]     BIOPSY       COLONOSCOPY  2014     COMBINED CYSTOSCOPY, INSERT STENT URETER(S) Bilateral 9/12/2018    Procedure: COMBINED CYSTOSCOPY, INSERT STENT URETER(S);  Cystoscopy Bilateral ureteral Stent Placement.;  Surgeon: Justin Henry MD;  [facility]     COMBINED CYSTOSCOPY, RETROGRADES, URETEROSCOPY, INSERT STENT Bilateral 4/3/2018    Procedure: COMBINED CYSTOSCOPY, RETROGRADES, URETEROSCOPY, INSERT STENT;;  Surgeon: Stuart King MD;  [facility]     COMBINED CYSTOSCOPY, RETROGRADES, URETEROSCOPY, INSERT STENT Bilateral 9/10/2018    Procedure: COMBINED CYSTOSCOPY, RETROGRADES, URETEROSCOPY, INSERT STENT;  Cystoscopy, Bilateral Ureteroscopy, Bladder Biopsies, Retrogram Pyelograms, Ureteral Washings and brushings, cysview;   Surgeon: Stuart King MD;  [facility]     COMBINED CYSTOSCOPY, RETROGRADES, URETEROSCOPY, INSERT STENT Left 8/26/2020    Procedure: Cystoscopy, Left Ureteral Wash, Left ureteral Retrograde Pyelogram;  Surgeon: Justin Henry MD;  [facility]     CYSTOSCOPY, BIOPSY BLADDER INSTILL OPTICAL AGENT N/A 4/3/2018    Procedure: CYSTOSCOPY, BIOPSY BLADDER INSTILL OPTICAL AGENT;  Cystoscopy, Blue Light Cystoscopy, Bladder Biopsies, Bilateral Selective ureteral washings for Cytology, Bilateral Retrograde Pyelograms, Bilateral Ureteroscopy;  Surgeon: Stuart King MD;  [facility]     CYSTOSCOPY, BIOPSY BLADDER, COMBINED N/A 2/19/2018    Procedure: COMBINED CYSTOSCOPY, BIOPSY BLADDER;  Cystoscopy, Bladder Biopsy;  Surgeon: Kenna La MD;  [facility]     CYSTOSCOPY, BIOPSY BLADDER, COMBINED N/A 8/26/2020    Procedure: Cystoscopy, biopsy bladder, combined;  Surgeon: Justin Henry MD;  [facility]     CYSTOSCOPY, FULGURATE BLADDER TUMOR, COMBINED N/A 1/13/2020    Procedure: CYSTOSCOPY, WITH  bladder biopsies and fulguration;  Surgeon: Stuart King MD;  [facility]     CYSTOSCOPY, REMOVE STENT(S), COMBINED  11/13/2018    Procedure: Flexible Cystoscopy with Stent Removal;  Surgeon: Stuart King MD;  [facility]     DAVINCI LYMPHADENECTOMY N/A 11/13/2018    Procedure: Davinci Lymphadenectomy ;  Surgeon: Stuart King MD;  [facility]     DAVINCI NEPHROURETERECTOMY N/A 11/13/2018    Procedure: Right DaVinci Assisted Nephroureterectomy;  Surgeon: Stuart King MD;  [facility]     ENDOSCOPIC ULTRASOUND LOWER GASTROINTESTIONAL TRACT (GI) N/A 10/30/2015    Procedure: ENDOSCOPIC ULTRASOUND LOWER GASTROINTESTIONAL TRACT (GI);  Surgeon: Daniel Jean-Baptiste MD;  [facility]     EYE SURGERY  12/4/17     INSERT PORT VASCULAR ACCESS Right 12/19/2018    Procedure: Chest Port Placement - right;   Surgeon: Stuart Chavez PA-C;  [facility]     IR CHEST PORT PLACEMENT > 5 YRS OF AGE  12/19/2018     IR PORT REMOVAL RIGHT  6/26/2019     LAPAROSCOPIC CHOLECYSTECTOMY WITH CHOLANGIOGRAMS N/A 11/1/2015    Procedure: LAPAROSCOPIC CHOLECYSTECTOMY WITH CHOLANGIOGRAMS;  Surgeon: Tonie Warren MD;  [facility]     REMOVE PORT VASCULAR ACCESS Right 6/26/2019    Procedure: Right Port Removal;  Surgeon: Froilan Irizarry PA-C;  [facility]     SURGICAL HISTORY OF -   1996    malignant melanoma     SURGICAL HISTORY OF -   1968    thyroid nodule     SURGICAL HISTORY OF -       D & C     ZZC NONSPECIFIC PROCEDURE  2005    colonoscopy polyp repeat 2010       ALLERGIES:  Allergies   Allergen Reactions     Codeine        FAMILY HISTORY:  Family History   Problem Relation Age of Onset     Cancer Father         dec - esophageal and laryngeal     Heart Disease Mother      Respiratory Mother         dec     Breast Cancer Daughter      Other Cancer Daughter      Thyroid Disease Daughter      Asthma Daughter      Hyperlipidemia Son      Diabetes Son      Anesthesia Reaction No family hx of      Deep Vein Thrombosis (DVT) No family hx of        SOCIAL HISTORY:  Social History     Tobacco Use     Smoking status: Never Smoker     Smokeless tobacco: Never Used   Vaping Use     Vaping Use: Never used   Substance Use Topics     Alcohol use: No     Alcohol/week: 0.0 standard drinks     Comment: rare     Drug use: No         ROS:   Constitutional: No fever, chills, or sweats. Weight stable.   ENT: No visual disturbance, ear ache, epistaxis, sore throat.   Cardiovascular: As per HPI.   Respiratory: No cough, hemoptysis.    GI: No nausea, vomiting, .   : No hematuria.   Integument: Negative.   Psychiatric: Negative.   Hematologic: no easy bleeding.  Neuro: Negative.   Endocrinology: No significant heat or cold intolerance   Musculoskeletal: No myalgia.    Exam:  /68 (BP Location: Left arm, Patient  "Position: Chair, Cuff Size: Adult Regular)   Pulse 112   Ht 1.448 m (4' 9\")   Wt 78 kg (172 lb)   SpO2 96%   BMI 37.22 kg/m    GENERAL APPEARANCE: Elderly moderately obese but otherwise , alert and no distress  HEENT: no icterus, no xanthelasmas, normal pupil size and reaction, normal palate, mucosa moist, no central cyanosis  NECK: no adenopathy, no asymmetry, masses, or scars, thyroid normal to palpation and no bruits, JVP not elevated  RESPIRATORY: lungs clear to auscultation - no rales, rhonchi or wheezes, no use of accessory muscles, no retractions, respirations are unlabored, normal respiratory rate  CARDIOVASCULAR: regular rhythm, normal S1 with physiologic split S2, no S3 or S4 and no murmur, click or rub, precordium quiet with normal PMI.  ABDOMEN: soft, non tender, without hepatosplenomegaly, no masses palpable, bowel sounds normal, aorta not enlarged by palpation, no abdominal bruits  EXTREMITIES: peripheral pulses normal, no edema, no bruits  NEURO: alert and oriented to person/place/time, normal speech, gait and affect  SKIN: no ecchymoses, no rashes    Labs:  CBC RESULTS:   Lab Results   Component Value Date    WBC 6.3 09/01/2021    WBC 7.1 05/11/2021    RBC 4.00 09/01/2021    RBC 4.00 05/11/2021    HGB 12.4 09/01/2021    HGB 12.6 05/11/2021    HCT 38.2 09/01/2021    HCT 39.2 05/11/2021    MCV 96 09/01/2021    MCV 98 05/11/2021    MCH 31.0 09/01/2021    MCH 31.5 05/11/2021    MCHC 32.5 09/01/2021    MCHC 32.1 05/11/2021    RDW 12.5 09/01/2021    RDW 13.6 05/11/2021     09/01/2021     05/11/2021       BMP RESULTS:  Lab Results   Component Value Date     09/02/2021     05/11/2021    POTASSIUM 4.0 09/02/2021    POTASSIUM 4.2 05/11/2021    CHLORIDE 109 09/02/2021    CHLORIDE 101 05/11/2021    CO2 25 09/02/2021    CO2 29 05/11/2021    ANIONGAP 5 09/02/2021    ANIONGAP 8 05/11/2021     (H) 09/02/2021     (H) 05/11/2021    BUN 14 09/02/2021    BUN 24 05/11/2021    " CR 0.97 09/02/2021    CR 1.04 05/11/2021    GFRESTIMATED 52 (L) 09/02/2021    GFRESTIMATED 50 (L) 08/11/2021    GFRESTIMATED 48 (L) 05/11/2021    GFRESTBLACK 56 (L) 05/11/2021    SHREYA 8.8 09/02/2021    SHREYA 9.3 05/11/2021        INR RESULTS:  Lab Results   Component Value Date    INR 0.95 07/20/2020    INR 0.99 06/26/2019    INR 0.97 02/07/2019    INR 0.91 12/19/2018     Procedures:  PULMONARY FUNCTION TESTS:   No flowsheet data found.    ECHOCARDIOGRAM:   No results found for this or any previous visit (from the past 8760 hour(s)).      Assessment and Plan:   1.  Permanent atrial fibrillation with tendency to relatively rapid ventricular response-increase diltiazem dose today.  She will remain on moderate dose of propranolol which is probably as much as she will tolerate given her pulmonary status  2.  Discussed changes in medication and will provide prescriptions for Xarelto refill and for the increase diltiazem    Plan  1.  Meds as discussed above  2.  Follow-up in 1 year or earlier if needed    Total elapsed time today with chart review, face-to-face and documentation 40 minutes    I very much appreciated the opportunity to see and assess Dimple JAMMIE Preet in the clinic  Please do not hesitate to contact my office if you have any questions or concerns.          Please do not hesitate to contact me if you have any questions/concerns.     Sincerely,     Butch Griffith MD

## 2021-10-28 LAB
ATRIAL RATE - MUSE: 120 BPM
DIASTOLIC BLOOD PRESSURE - MUSE: NORMAL MMHG
INTERPRETATION ECG - MUSE: NORMAL
P AXIS - MUSE: NORMAL DEGREES
PR INTERVAL - MUSE: NORMAL MS
QRS DURATION - MUSE: 104 MS
QT - MUSE: 346 MS
QTC - MUSE: 439 MS
R AXIS - MUSE: -48 DEGREES
SYSTOLIC BLOOD PRESSURE - MUSE: NORMAL MMHG
T AXIS - MUSE: 92 DEGREES
VENTRICULAR RATE- MUSE: 97 BPM

## 2021-11-01 ENCOUNTER — TELEPHONE (OUTPATIENT)
Dept: ORTHOPEDICS | Facility: CLINIC | Age: 86
End: 2021-11-01

## 2021-11-01 DIAGNOSIS — Z98.890 STATUS POST KNEE SURGERY: Primary | ICD-10-CM

## 2021-11-01 RX ORDER — AMOXICILLIN 500 MG/1
TABLET, FILM COATED ORAL
Qty: 4 TABLET | Refills: 3 | Status: SHIPPED | OUTPATIENT
Start: 2021-11-01 | End: 2022-05-02

## 2021-11-01 NOTE — TELEPHONE ENCOUNTER
[facility]    Phone Message    May a detailed message be left on voicemail: yes     Reason for Call: Other: Patient is asking for an Antibiotic prescription. Needed today, as she is getting a crown put on, please fill at [facility] ph [phone]     Action Taken: Message routed to:  [facility] ([facility] Ortho    Travel Screening: Not Applicable

## 2021-11-03 ENCOUNTER — LAB (OUTPATIENT)
Dept: LAB | Facility: CLINIC | Age: 86
End: 2021-11-03
Payer: MEDICARE

## 2021-11-03 DIAGNOSIS — E78.5 HYPERLIPIDEMIA LDL GOAL <130: ICD-10-CM

## 2021-11-03 PROCEDURE — 36415 COLL VENOUS BLD VENIPUNCTURE: CPT

## 2021-11-03 PROCEDURE — 80061 LIPID PANEL: CPT

## 2021-11-04 LAB
CHOLEST SERPL-MCNC: 129 MG/DL
FASTING STATUS PATIENT QL REPORTED: YES
HDLC SERPL-MCNC: 42 MG/DL
LDLC SERPL CALC-MCNC: 68 MG/DL
NONHDLC SERPL-MCNC: 87 MG/DL
TRIGL SERPL-MCNC: 93 MG/DL

## 2021-11-09 ENCOUNTER — PRE VISIT (OUTPATIENT)
Dept: UROLOGY | Facility: CLINIC | Age: 86
End: 2021-11-09
Payer: MEDICARE

## 2021-11-09 NOTE — TELEPHONE ENCOUNTER
Reason for visit: cysto     Relevant information: bladder neoplasm    Records/imaging/labs/orders: in epic    Pt called: no    At Rooming: urine

## 2021-11-12 ENCOUNTER — OFFICE VISIT (OUTPATIENT)
Dept: UROLOGY | Facility: CLINIC | Age: 86
End: 2021-11-12
Payer: MEDICARE

## 2021-11-12 VITALS
WEIGHT: 170 LBS | DIASTOLIC BLOOD PRESSURE: 66 MMHG | SYSTOLIC BLOOD PRESSURE: 98 MMHG | BODY MASS INDEX: 36.68 KG/M2 | HEART RATE: 80 BPM | HEIGHT: 57 IN

## 2021-11-12 DIAGNOSIS — C68.9 UROTHELIAL CANCER (H): ICD-10-CM

## 2021-11-12 DIAGNOSIS — C67.8 MALIGNANT NEOPLASM OF OVERLAPPING SITES OF BLADDER (H): Primary | ICD-10-CM

## 2021-11-12 DIAGNOSIS — C68.9 UROTHELIAL CARCINOMA (H): ICD-10-CM

## 2021-11-12 PROCEDURE — 88120 CYTP URNE 3-5 PROBES EA SPEC: CPT | Mod: 26 | Performed by: PATHOLOGY

## 2021-11-12 PROCEDURE — 88112 CYTOPATH CELL ENHANCE TECH: CPT | Mod: TC | Performed by: UROLOGY

## 2021-11-12 PROCEDURE — 88112 CYTOPATH CELL ENHANCE TECH: CPT | Mod: 26 | Performed by: PATHOLOGY

## 2021-11-12 PROCEDURE — 52000 CYSTOURETHROSCOPY: CPT | Performed by: UROLOGY

## 2021-11-12 PROCEDURE — 88120 CYTP URNE 3-5 PROBES EA SPEC: CPT | Mod: TC | Performed by: UROLOGY

## 2021-11-12 RX ORDER — DILTIAZEM HYDROCHLORIDE 180 MG/1
CAPSULE, COATED, EXTENDED RELEASE ORAL
COMMUNITY
Start: 2021-10-27 | End: 2022-01-24

## 2021-11-12 RX ORDER — LIDOCAINE HYDROCHLORIDE 20 MG/ML
JELLY TOPICAL ONCE
Status: COMPLETED | OUTPATIENT
Start: 2021-11-12 | End: 2021-11-12

## 2021-11-12 RX ADMIN — LIDOCAINE HYDROCHLORIDE: 20 JELLY TOPICAL at 10:49

## 2021-11-12 ASSESSMENT — MIFFLIN-ST. JEOR: SCORE: 1074.99

## 2021-11-12 ASSESSMENT — PAIN SCALES - GENERAL: PAINLEVEL: NO PAIN (0)

## 2021-11-12 NOTE — LETTER
11/12/2021       RE: Dimple Oviedo  [address]  [facility] [address]     Dear Colleague,    Thank you for referring your patient, Dimple Oviedo, to the [facility] [place] [facility]. Please see a copy of my visit note below.    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 She completed chemotherapy (gem/carbo).     -Continue q3-4 month CT Chest Abdomen and Pelvis with and without contrast with Dr Fine.     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   Got 6 of BCG and tolerated it well.   Negative biopsies 4/2020, 8/26/20.    Plan for 3 weekly maintenance BCG treatments at 3, 6, 12, 18, 24, 30, and 36 months.    12 month maintenance in 5/2021      Plan  -3 week maintenance BCG in near future.  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 The patient is following yolanda Toledo of Hematology and Oncology.      Per Dr. Toledo's note, on 12/12/18 Mr. Oviedo- Started adjuvant chemotherapy (carbo+gem) per POUT trial. Cycle 2 - 1/2/19. Cycle 3 - 1/23/19. Cycle 4 - 02/13/19.    Any recurrence cystoscopy on 10/03/2019 showed small area of erythema. Bladder biopsy from 01/13/2020 showed urothelial carcinoma in-situ , negative biopsy 5/2020    Intravesical Therapy: BCG X6 Feb 2020 (Full Strength)     She had right total knee arthoplasty 02/17/2020    Dr Fine is doing follow-up as well.    Today she is doing well.      CYSTOSCOPY PROCEDURE:  Sterile preparation and drape and an informed consent were performed.  A 16-Cymro  flexible cystoscope was introduced via the urethra.  The urethra was open.  The bladder mucosa showed no evidence of any lesions or trabeculation.  There were no stones.  Urine aspirated for cytology       Again, thank you for allowing me to participate in the care of your patient.      Sincerely,    Justin Henry MD

## 2021-11-12 NOTE — NURSING NOTE
"Chief Complaint   Patient presents with     Consult For      surveillance cystoscopy, patient reports she spent all day in the bathroom with diarrhea        Blood pressure 98/66, pulse 80, height 1.448 m (4' 9\"), weight 77.1 kg (170 lb), not currently breastfeeding. Body mass index is 36.79 kg/m .    Patient Active Problem List   Diagnosis     Esophageal reflux     Restless leg syndrome     heat intolerance     Goiter     Disorder of bone and cartilage     Other psoriasis     perirectal cyst     Malignant melanoma of skin of trunk, except scrotum (H)     Nontoxic multinodular goiter     Hyperlipidemia LDL goal <130     Hypertension goal BP (blood pressure) < 140/90     Urinary incontinence     Hip arthritis     Polymyalgia rheumatica (H)     High risk medication use     Shoulder pain     Impaired fasting blood sugar     Chronic bilateral low back pain without sciatica     Obesity, unspecified obesity severity, unspecified obesity type     Iron deficiency anemia, unspecified iron deficiency anemia type     NSTEMI (non-ST elevated myocardial infarction) (H)     Stress-induced cardiomyopathy     A-fib (H)     Anxiety     Chronic systolic heart failure (H)     Urothelial carcinoma (H)     Hyperthyroidism     CRESENCIO (acute kidney injury) (H)     Hydronephrosis     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)     Graves disease     Encounter for antineoplastic chemotherapy     Chemotherapy-induced neutropenia (H)     Primary localized osteoarthritis of right knee     Urothelial cancer (H)     Malignant neoplasm of overlapping sites of bladder (H)     Primary osteoarthritis of right knee     Chronic kidney disease, stage 3 (H)     Lipedematous scalp     Atrial fibrillation with rapid ventricular response (H)       Allergies   Allergen Reactions     Codeine        Current Outpatient Medications   Medication Sig Dispense Refill     alendronate (FOSAMAX) 70 MG tablet TAKE 1 TABLET EVERY 7 DAYS AT LEAST 60 MINUTES BEFORE " BREAKFAST AS DIRECTED. 12 tablet 3     bisacodyl (DULCOLAX) 5 MG EC tablet Take 2 tablets by mouth at bedtime two days prior to procedure.  Take 2 tabs by mouth at 3pm one day prior to procedure. 4 tablet 0     diltiazem ER (DILT-XR) 180 MG 24 hr capsule Take 1 capsule (180 mg) by mouth daily 90 capsule 0     ferrous sulfate 325 (65 Fe) MG TBEC EC tablet Take 325 mg by mouth every morning        furosemide (LASIX) 20 MG tablet Take 1 tablet (20 mg) by mouth 2 times daily .  Take in the morning and after lunch. 90 tablet 1     irbesartan (AVAPRO) 300 MG tablet TAKE 1 TABLET EVERY DAY 90 tablet 2     lovastatin (MEVACOR) 40 MG tablet TAKE 1 TABLET AT BEDTIME 90 tablet 0     methimazole (TAPAZOLE) 5 MG tablet Take 5 mg alternating with 2.5 mg every other day 90 tablet 3     Omega-3 Fatty Acids (FISH OIL) 500 MG CAPS Take 1 capsule by mouth every morning        omeprazole (PRILOSEC) 20 MG DR capsule TAKE 1 CAPSULE EVERY DAY 90 capsule 1     ondansetron (ZOFRAN-ODT) 4 MG ODT tab Take 1 tablet (4 mg) by mouth every 6 hours as needed for nausea or vomiting 20 tablet 0     propranolol ER (INDERAL LA) 60 MG 24 hr capsule Take 1 capsule (60 mg) by mouth daily 90 capsule 2     rivaroxaban ANTICOAGULANT (XARELTO) 15 MG TABS tablet Take 1 tablet (15 mg) by mouth daily (with dinner) 90 tablet 0     rOPINIRole (REQUIP) 0.25 MG tablet TAKE 1 TABLET IN THE AFTERNOON AND TAKE 2 TABLETS EVERY NIGHT 270 tablet 3     sertraline (ZOLOFT) 100 MG tablet TAKE 1 TABLET EVERY MORNING 90 tablet 0     traZODone (DESYREL) 50 MG tablet TAKE 1/2 TABLET AT BEDTIME 45 tablet 3     amoxicillin (AMOXIL) 500 MG tablet Take 4 tablets (2000 mg) by mouth 1 hour before dental procedures/cleanings. (Patient not taking: Reported on 11/12/2021) 4 tablet 3     diltiazem ER COATED BEADS (CARDIZEM CD/CARTIA XT) 180 MG 24 hr capsule          Social History     Tobacco Use     Smoking status: Never Smoker     Smokeless tobacco: Never Used   Vaping Use     Vaping  Use: Never used   Substance Use Topics     Alcohol use: No     Alcohol/week: 0.0 standard drinks     Comment: rare     Drug use: No       Invasive Procedure Safety Checklist:    Procedure: Cystoscopy    Action: Complete sections and checkboxes as appropriate.    Pre-procedure:  1. Patient ID Verified with 2 identifiers (Bettie and  or MRN) : YES    2. Procedure and site verified with patient/designee (when able) : YES    3. Accurate consent documentation in medical record : YES    4. H&P (or appropriate assessment) documented in medical record : N/A  H&P must be up to 30 days prior to procedure an updated within 24 hours of                 Procedure as applicable.     5. Relevant diagnostic and radiology test results appropriately labeled and displayed as applicable : YES    6. Blood products, implants, devices, and/or special equipment available for the procedure as applicable : YES    7. Procedure site(s) marked with provider initials [Exclusions: none] : NO    8. Marking not required. Reason : Yes  Procedure does not require site marking    Time Out:     Time-Out performed immediately prior to starting procedure, including verbal and active participation of all team members addressing: YES    1. Correct patient identity.  2. Confirmed that the correct side and site are marked.  3. An accurate procedure to be done.  4. Agreement on the procedure to be done.  5. Correct patient position.  6. Relevant images and results are properly labeled and appropriately displayed.  7. The need to administer antibiotics or fluids for irrigation purposes during the procedure as applicable.  8. Safety precautions based on patient history or medication use.    During Procedure: Verification of correct person, site, and procedure occurs any time the responsibility for care of the patient is transferred to another member of the care team.    The following medication was given:     MEDICATION:  Lidocaine without epinephrine 2%  jelly  ROUTE: urethral   SITE: urethral   DOSE: 10 mL  LOT #: [id number]  : [employer]  EXPIRATION DATE: 5-23  NDC#: [address]-0   Was there drug waste? No    Prior to med admin, verified patient identity using patient's name and date of birth.  Due to med administration, patient instructed to remain in clinic for 15 minutes  afterwards, and to report any adverse reaction to me immediately.    Drug Amount Wasted:  None.  Vial/Syringe: ELVIS Zhong, EMT  11/12/2021  10:40 AM

## 2021-11-12 NOTE — PROGRESS NOTES
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 She completed chemotherapy (gem/carbo).     -Continue q3-4 month CT Chest Abdomen and Pelvis with and without contrast with Dr Fine.     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   Got 6 of BCG and tolerated it well.   Negative biopsies 4/2020, 8/26/20.    Plan for 3 weekly maintenance BCG treatments at 3, 6, 12, 18, 24, 30, and 36 months.    12 month maintenance in 5/2021      Plan  -3 week maintenance BCG in near future.  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 The patient is following yolanda Toledo of Hematology and Oncology.      Per Dr. Toledo's note, on 12/12/18 Mr. Oviedo- Started adjuvant chemotherapy (carbo+gem) per POUT trial. Cycle 2 - 1/2/19. Cycle 3 - 1/23/19. Cycle 4 - 02/13/19.    Any recurrence cystoscopy on 10/03/2019 showed small area of erythema. Bladder biopsy from 01/13/2020 showed urothelial carcinoma in-situ , negative biopsy 5/2020    Intravesical Therapy: BCG X6 Feb 2020 (Full Strength)     She had right total knee arthoplasty 02/17/2020    Dr Fine is doing follow-up as well.    Today she is doing well.      CYSTOSCOPY PROCEDURE:  Sterile preparation and drape and an informed consent were performed.  A 16-[place] flexible cystoscope was introduced via the urethra.  The urethra was open.  The bladder mucosa showed no evidence of any lesions or trabeculation.  There were no stones.  Urine aspirated for cytology      2021 03:36

## 2021-11-17 ENCOUNTER — OFFICE VISIT (OUTPATIENT)
Dept: FAMILY MEDICINE | Facility: CLINIC | Age: 86
End: 2021-11-17
Payer: MEDICARE

## 2021-11-17 VITALS
BODY MASS INDEX: 37.02 KG/M2 | DIASTOLIC BLOOD PRESSURE: 97 MMHG | WEIGHT: 171.08 LBS | TEMPERATURE: 96.9 F | SYSTOLIC BLOOD PRESSURE: 135 MMHG | HEART RATE: 89 BPM | OXYGEN SATURATION: 96 %

## 2021-11-17 DIAGNOSIS — I72.2 ANEURYSM OF RENAL ARTERY (H): ICD-10-CM

## 2021-11-17 DIAGNOSIS — H61.23 BILATERAL IMPACTED CERUMEN: Primary | ICD-10-CM

## 2021-11-17 DIAGNOSIS — I48.0 PAROXYSMAL ATRIAL FIBRILLATION (H): ICD-10-CM

## 2021-11-17 DIAGNOSIS — C67.8 MALIGNANT NEOPLASM OF OVERLAPPING SITES OF BLADDER (H): ICD-10-CM

## 2021-11-17 DIAGNOSIS — E66.01 MORBID OBESITY (H): ICD-10-CM

## 2021-11-17 DIAGNOSIS — M35.3 POLYMYALGIA RHEUMATICA (H): ICD-10-CM

## 2021-11-17 PROBLEM — T45.1X5A CHEMOTHERAPY-INDUCED NEUTROPENIA (H): Status: RESOLVED | Noted: 2019-03-06 | Resolved: 2021-11-17

## 2021-11-17 PROBLEM — D70.1 CHEMOTHERAPY-INDUCED NEUTROPENIA (H): Status: RESOLVED | Noted: 2019-03-06 | Resolved: 2021-11-17

## 2021-11-17 LAB
PATH REPORT.COMMENTS IMP SPEC: NORMAL
PATH REPORT.FINAL DX SPEC: NORMAL
PATH REPORT.GROSS SPEC: NORMAL
PATH REPORT.RELEVANT HX SPEC: NORMAL

## 2021-11-17 PROCEDURE — 99213 OFFICE O/P EST LOW 20 MIN: CPT | Performed by: FAMILY MEDICINE

## 2021-11-17 NOTE — PROGRESS NOTES
"  Assessment & Plan     Dimple was seen today for cerumen impaction.    Diagnoses and all orders for this visit:    Bilateral impacted cerumen  Comments:  Ear wash today    Morbid obesity (H)  Comments:  little bit of weight loss recommended  BMI 37    Polymyalgia rheumatica (H)  Comments:  In remission, last CRP normal range.  Watch for recurrence    Aneurysm of renal artery (H)  Comments:  Reviewed CT scans, has been stable and unchanged since 2019  Last scan in 2021    Malignant neoplasm of overlapping sites of bladder (H)  Comments:  has appt with oncology Dr. Fine next week; urology the following week.  Currently actively monitoring with CT C/A/P every 3 months.    Paroxysmal atrial fibrillation (H)  Comments:  Scheduled appt with PCP for next week             BMI:   Estimated body mass index is 37.02 kg/m  as calculated from the following:    Height as of 11/12/21: 1.448 m (4' 9\").    Weight as of this encounter: 77.6 kg (171 lb 1.3 oz).       Return in about 1 week (around 11/24/2021) for with your PCP.    Kerry Wright MD  [facility]   Dimple is a 88 year old who presents for the following health issues     HPI     -She has been having blood in her stool , they did schedule her to do a colonoscopy but they did not due it due to her having afib     -They told her to schedule with someone else.  Would like to discuss with Dr. Zapien.    -Wax build up bilateral         Review of Systems   Constitutional, HEENT, cardiovascular, pulmonary, gi and gu systems are negative, except as otherwise noted.      Objective    BP (!) 135/97 (BP Location: Left arm, Patient Position: Sitting, Cuff Size: Adult Regular)   Pulse 89   Temp 96.9  F (36.1  C) (Temporal)   Wt 77.6 kg (171 lb 1.3 oz)   SpO2 96%   BMI 37.02 kg/m    Body mass index is 37.02 kg/m .  Physical Exam   GENERAL: healthy, alert and no distress  HENT: normal cephalic/atraumatic, both ears: occluded with wax, " "nose and mouth without ulcers or lesions, oropharynx clear and oral mucous membranes moist      ADDENDUM: Patient tripped and fell in exam room getting off table with assistance per MA and patient report.  I was called to room, Dimple stated \"I'm fine, just embarrassed\".  Alert, awake, NAD. Complained of mild knee pain, examined and normal exam and no bruising.  Helped patient up and she stated she felt fine and much better, walking steadily.  Kerry Osei MA walked her to car.  Dr. Kerry Wright MD / [facility]           "

## 2021-11-18 NOTE — NURSING NOTE
Dimple Oviedo is a 88 year old female who presents in clinic with complaint of impacted ear wax (cerumen).  Per the order of , ear wax was removed from both sides by flushing with warm water and manual debridement has been performed. Patient denies pain/dizziness/discharge/drainage  (if yes, stop procedure and huddle with provider).  Ear wax has been successfully removed. (If not, huddle with provider).   Kerry Osei, CMA

## 2021-11-19 ENCOUNTER — LAB (OUTPATIENT)
Dept: LAB | Facility: CLINIC | Age: 86
End: 2021-11-19
Attending: INTERNAL MEDICINE

## 2021-11-19 ENCOUNTER — ANCILLARY PROCEDURE (OUTPATIENT)
Dept: CT IMAGING | Facility: CLINIC | Age: 86
End: 2021-11-19
Attending: INTERNAL MEDICINE
Payer: MEDICARE

## 2021-11-19 DIAGNOSIS — C66.1 UROTHELIAL CARCINOMA OF RIGHT DISTAL URETER (H): ICD-10-CM

## 2021-11-19 DIAGNOSIS — E05.00 GRAVES DISEASE: ICD-10-CM

## 2021-11-19 LAB
ALBUMIN SERPL-MCNC: 3.4 G/DL (ref 3.4–5)
ALP SERPL-CCNC: 98 U/L (ref 40–150)
ALT SERPL W P-5'-P-CCNC: 23 U/L (ref 0–50)
ANION GAP SERPL CALCULATED.3IONS-SCNC: 8 MMOL/L (ref 3–14)
AST SERPL W P-5'-P-CCNC: 15 U/L (ref 0–45)
BASOPHILS # BLD AUTO: 0.1 10E3/UL (ref 0–0.2)
BASOPHILS NFR BLD AUTO: 1 %
BILIRUB SERPL-MCNC: 0.5 MG/DL (ref 0.2–1.3)
BUN SERPL-MCNC: 22 MG/DL (ref 7–30)
CALCIUM SERPL-MCNC: 9.2 MG/DL (ref 8.5–10.1)
CHLORIDE BLD-SCNC: 105 MMOL/L (ref 94–109)
CO2 SERPL-SCNC: 28 MMOL/L (ref 20–32)
CREAT BLD-MCNC: 1.2 MG/DL (ref 0.5–1)
CREAT SERPL-MCNC: 1.08 MG/DL (ref 0.52–1.04)
EOSINOPHIL # BLD AUTO: 0.2 10E3/UL (ref 0–0.7)
EOSINOPHIL NFR BLD AUTO: 2 %
ERYTHROCYTE [DISTWIDTH] IN BLOOD BY AUTOMATED COUNT: 13 % (ref 10–15)
GFR SERPL CREATININE-BSD FRML MDRD: 40 ML/MIN/1.73M2
GFR SERPL CREATININE-BSD FRML MDRD: 46 ML/MIN/1.73M2
GLUCOSE BLD-MCNC: 130 MG/DL (ref 70–99)
HCT VFR BLD AUTO: 36.3 % (ref 35–47)
HGB BLD-MCNC: 11.4 G/DL (ref 11.7–15.7)
IMM GRANULOCYTES # BLD: 0 10E3/UL
IMM GRANULOCYTES NFR BLD: 0 %
LDH SERPL L TO P-CCNC: 180 U/L (ref 81–234)
LYMPHOCYTES # BLD AUTO: 1.2 10E3/UL (ref 0.8–5.3)
LYMPHOCYTES NFR BLD AUTO: 16 %
MCH RBC QN AUTO: 29.8 PG (ref 26.5–33)
MCHC RBC AUTO-ENTMCNC: 31.4 G/DL (ref 31.5–36.5)
MCV RBC AUTO: 95 FL (ref 78–100)
MONOCYTES # BLD AUTO: 0.7 10E3/UL (ref 0–1.3)
MONOCYTES NFR BLD AUTO: 9 %
NEUTROPHILS # BLD AUTO: 5.4 10E3/UL (ref 1.6–8.3)
NEUTROPHILS NFR BLD AUTO: 72 %
NRBC # BLD AUTO: 0 10E3/UL
NRBC BLD AUTO-RTO: 0 /100
PLATELET # BLD AUTO: 233 10E3/UL (ref 150–450)
POTASSIUM BLD-SCNC: 4.4 MMOL/L (ref 3.4–5.3)
PROT SERPL-MCNC: 7.4 G/DL (ref 6.8–8.8)
RBC # BLD AUTO: 3.82 10E6/UL (ref 3.8–5.2)
SODIUM SERPL-SCNC: 141 MMOL/L (ref 133–144)
WBC # BLD AUTO: 7.5 10E3/UL (ref 4–11)

## 2021-11-19 PROCEDURE — 80053 COMPREHEN METABOLIC PANEL: CPT | Performed by: PATHOLOGY

## 2021-11-19 PROCEDURE — 82565 ASSAY OF CREATININE: CPT | Performed by: PATHOLOGY

## 2021-11-19 PROCEDURE — 71260 CT THORAX DX C+: CPT | Performed by: RADIOLOGY

## 2021-11-19 PROCEDURE — 74177 CT ABD & PELVIS W/CONTRAST: CPT | Performed by: RADIOLOGY

## 2021-11-19 PROCEDURE — 36415 COLL VENOUS BLD VENIPUNCTURE: CPT | Performed by: PATHOLOGY

## 2021-11-19 PROCEDURE — 85025 COMPLETE CBC W/AUTO DIFF WBC: CPT | Performed by: PATHOLOGY

## 2021-11-19 PROCEDURE — 83615 LACTATE (LD) (LDH) ENZYME: CPT | Performed by: PATHOLOGY

## 2021-11-19 RX ORDER — IOPAMIDOL 755 MG/ML
105 INJECTION, SOLUTION INTRAVASCULAR ONCE
Status: COMPLETED | OUTPATIENT
Start: 2021-11-19 | End: 2021-11-19

## 2021-11-19 RX ADMIN — IOPAMIDOL 105 ML: 755 INJECTION, SOLUTION INTRAVASCULAR at 09:23

## 2021-11-23 ENCOUNTER — TELEPHONE (OUTPATIENT)
Dept: SURGERY | Facility: CLINIC | Age: 86
End: 2021-11-23

## 2021-11-23 ENCOUNTER — VIRTUAL VISIT (OUTPATIENT)
Dept: FAMILY MEDICINE | Facility: CLINIC | Age: 86
End: 2021-11-23
Payer: MEDICARE

## 2021-11-23 DIAGNOSIS — K62.5 BRBPR (BRIGHT RED BLOOD PER RECTUM): Primary | ICD-10-CM

## 2021-11-23 DIAGNOSIS — I48.0 PAROXYSMAL ATRIAL FIBRILLATION (H): ICD-10-CM

## 2021-11-23 PROCEDURE — 99442 PR PHYSICIAN TELEPHONE EVALUATION 11-20 MIN: CPT | Mod: 95 | Performed by: FAMILY MEDICINE

## 2021-11-23 NOTE — PROGRESS NOTES
"Dimple is a 88 year old who is being evaluated via a billable video visit.      How would you like to obtain your AVS? MyChart  If the video visit is dropped, the invitation should be resent by: Send to e-mail at: [email or web address]  Will anyone else be joining your video visit? No           Assessment & Plan     BRBPR (bright red blood per rectum)  Persistent symptoms.  Previously when she was scheduled for colonoscopy she had to go to emergency room due to her atrial fibrillation.  She is stable from her A. fib standpoint.  She is using anticoagulant which may contribute to her symptoms.  She would like to have it evaluated further as she is frustrated with her symptoms and I recommended her to be seen by colorectal.  I will do urgent referral for her.  - Colorectal Surgery Referral; Future    Paroxysmal atrial fibrillation (H)  See above.               BMI:   Estimated body mass index is 37.02 kg/m  as calculated from the following:    Height as of 11/12/21: 1.448 m (4' 9\").    Weight as of 11/17/21: 77.6 kg (171 lb 1.3 oz).           No follow-ups on file.    Pop Zapien MD, MD  [facility]    Subjective   Dimple is a 88 year old who presents for the following health issues     HPI     Blood in stool  Onset/Duration: 2 months  Description:       Consistency of stool: formed       Blood in stool: YES       bloody stools with each BM: yes   Progression of Symptoms: worsening  Accompanying signs and symptoms:       Fever: no       Nausea/Vomiting: no       Abdominal pain: no       Weight loss: no       Episodes of constipation: once in a while   History   Ill contacts: no  Recent use of antibiotics: no  Recent travels: no  Recent medication-new or changes(Rx or OTC): no  Precipitating or alleviating factors: None  Therapies tried and outcome: none     blood in stool for several months. Suppose to have colonoscopy but did not happen as she had afib.   Now symptoms are " "coming back - no blood in toilet or when wipe.   Stool itself is bloody - red on outside. Brown orange in color. Used to be black with iron pill but now changed color.   Using blood thinner daily.     Review of Systems         Objective    Vitals - Patient Reported  Weight (Patient Reported): 77.6 kg (171 lb)  Height (Patient Reported): 144.8 cm (4' 9\")  BMI (Based on Pt Reported Ht/Wt): 37        Physical Exam   Due to telephone visit physical examination was not conducted.  Alert and not in acute distress.  Mentation intact.              Switched to telephone visit  Total time - 12 minutes    "

## 2021-11-24 ENCOUNTER — PRE VISIT (OUTPATIENT)
Dept: SURGERY | Facility: CLINIC | Age: 86
End: 2021-11-24
Payer: MEDICARE

## 2021-11-24 DIAGNOSIS — F41.1 GAD (GENERALIZED ANXIETY DISORDER): ICD-10-CM

## 2021-11-24 NOTE — TELEPHONE ENCOUNTER
RECORDS RECEIVED FROM: [facility]   Appt date: 11/29/21   NOTES STATUS DETAILS   OFFICE NOTE from referring provider  Internal 11/23/21 - Curry - ROBERTA   OFFICE NOTE from other specialist   Internal 11/12/21 - Dr. Henry - Urology (bladder/ right renal pelvis cancer)    DISCHARGE SUMMARY from hospital  N/A    DISCHARGE REPORT from the ER N/A    OPERATIVE REPORT  Internal 08/26/20 - Carl - Urology    MEDICATION LIST Internal    LABS     PFC TESTING N/A    ANAL PAP N/A    BIOPSIES/PATHOLOGY RELATED TO DIAGNOSIS N/A    DIAGNOSTIC PROCEDURES     COLONOSCOPY Internal 10/29/15, 01/07/11, 12/05/05   UPPER ENDOSCOPY (EGD) Internal 10/29/2015    FLEX SIGMOIDOSCOPY  N/A    ERCP N/A    IMAGING (DISC & REPORT)      CT  Internal 11/19/21, 8/11/21, 5/11/21,1/12/21 - CT chest/abdomen/pelvis    MRI N/A    XRAY Internal 10/10/21 - XR chest 2vw   ULTRASOUND (ENDOANAL/ENDORECTAL) N/A      Action 11/24/21 MMT   Action Taken  All records available in Epic.

## 2021-11-29 ENCOUNTER — OFFICE VISIT (OUTPATIENT)
Dept: SURGERY | Facility: CLINIC | Age: 86
End: 2021-11-29
Payer: MEDICARE

## 2021-11-29 VITALS
HEART RATE: 81 BPM | SYSTOLIC BLOOD PRESSURE: 130 MMHG | DIASTOLIC BLOOD PRESSURE: 67 MMHG | OXYGEN SATURATION: 95 % | TEMPERATURE: 98.1 F | BODY MASS INDEX: 36.7 KG/M2 | HEIGHT: 57 IN | WEIGHT: 170.1 LBS

## 2021-11-29 DIAGNOSIS — K59.09 OTHER CONSTIPATION: ICD-10-CM

## 2021-11-29 DIAGNOSIS — K62.5 RECTAL BLEEDING: Primary | ICD-10-CM

## 2021-11-29 PROCEDURE — 99205 OFFICE O/P NEW HI 60 MIN: CPT | Performed by: NURSE PRACTITIONER

## 2021-11-29 RX ORDER — SERTRALINE HYDROCHLORIDE 100 MG/1
TABLET, FILM COATED ORAL
Qty: 90 TABLET | Refills: 3 | Status: SHIPPED | OUTPATIENT
Start: 2021-11-29 | End: 2022-11-02

## 2021-11-29 ASSESSMENT — PAIN SCALES - GENERAL: PAINLEVEL: NO PAIN (0)

## 2021-11-29 ASSESSMENT — MIFFLIN-ST. JEOR: SCORE: 1075.45

## 2021-11-29 NOTE — LETTER
"2021       RE: Dimple Oviedo  [address]  [facility] [address]     Dear Colleague,    Thank you for referring your patient, Dimple Oviedo, to the [facility] COLON AND RECTAL SURGERY CLINIC [place] [facility]. Please see a copy of my visit note below.    Colon and Rectal Surgery Consult Clinic Note    Date: 2021     Referring provider:  Pop Zapien MD  [address] [place] [id number]     RE: Dimple Oviedo  : 1933  MADY: 2021    Dimple Oviedo is a very pleasant 88 year old female who presents today for rectal bleeding.    HPI:  Dimple presents today with her son.  She developed blood with a bowel movement in October.  This was with the stool only not with wiping or in the toilet bowl.  She was scheduled for a colonoscopy but this was not completed due to atrial fibrillation.  She is currently on Xarelto for this and Dr. Leventhal recommended an outpatient flexible sigmoidoscopy instead.  Her last colonoscopy in  was normal.  She developed a recurrence of bleeding on  and has had some black stools but is on an iron supplement.  No pain with bowel movements.  She does strain with bowel movements and these are often hard.  No change in stool caliber.  Does have some chronic anemia with hemoglobin of 11.4.  History of bladder cancer and she is on maintenance BCG.  She and her son both state that she is not interested in having a colonoscopy at this time.    Physical Examination:  /67 (BP Location: Left arm, Patient Position: Sitting, Cuff Size: Adult Regular)   Pulse 81   Temp 98.1  F (36.7  C) (Oral)   Ht 4' 9\"   Wt 170 lb 1.6 oz   SpO2 95%   BMI 36.81 kg/m    General: Alert, oriented, in no acute distress, sitting comfortably  HEENT: Mucous membranes moist    Perianal external examination: Exam was chaperoned by Chary Aguilar MA   Perianal skin: " Intact with no excoriation or lichenification.  Lesions: No evidence of an external lesion, nodularity, or induration in the perianal region.  Eversion of buttocks: There was not evidence of an anal fissure. Details: N/A.  Skin tags or external hemorrhoids: None.  Digital rectal examination: Was performed.   Sphincter tone: Poor.  Palpable lesions: No.  Other: hard stool palpable in rectum.  Bimanual examination: was not performed    Anoscopy: Was deferred    Procedures:  After discussing the risks and benefits, the patient agreed to proceed with flexible sigmoidoscopy.    A total of 6 fleet enema(s) were administered for a preparation.The sigmoidoscope was inserted to 30 cm and stopped due to inadequate bowel prep; stool blocking lumen.    Findings: Normal mucosa in the visualized lumen but visualization was poor do the presence of a significant amount of formed stool  Retroflexion: Was performed. This was small internal hemorrhoids without active bleeding.  The patient tolerated the procedure well.     Assessment/Plan: 88 year old female rectal bleeding and anemia.  She really has constipation I think this is likely causing irritation to her hemorrhoids and rectal bleeding.  No bleeding on exam.  Flexible sigmoidoscopy was performed but was very limited due to formed stool throughout the rectum and sigmoid despite 6 enemas.  Recommended starting a daily fiber supplement at a laxative in addition as needed for constipation.  If bleeding returns, she will either need to do a mini prep at home with repeat flexible sigmoidoscopy or consider a full colonoscopy. Patient's questions were answered to her stated satisfaction and she is in agreement with this plan.    Medical history:  Past Medical History:   Diagnosis Date     Arthritis      Calculus of kidney      Chemotherapy-induced neutropenia (H) 3/6/2019     Congestive heart failure (H)      Esophageal reflux      GERD (gastroesophageal reflux disease)       Hyperlipidemia LDL goal <130 5/9/2010     Malignant melanoma of skin of trunk, except scrotum (H)      Nonspecific abnormal finding     has living will 2004 -      Nontoxic multinodular goiter     no further eval /tx rec per pt     Osteopenia      Other psoriasis      Personal history of colonic polyps      PMR (polymyalgia rheumatica) (H)      Restless legs syndrome 7/11/2018     Stress-induced cardiomyopathy      Undiagnosed cardiac murmurs      Unspecified constipation      Unspecified essential hypertension      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       Surgical history:  Past Surgical History:   Procedure Laterality Date     ARTHROPLASTY KNEE Right 11/9/2020    Procedure: ARTHROPLASTY, KNEE, TOTAL, RIGHT;  Surgeon: Edward Otero MD;  [facility]     BIOPSY       COLONOSCOPY  2014     COMBINED CYSTOSCOPY, INSERT STENT URETER(S) Bilateral 9/12/2018    Procedure: COMBINED CYSTOSCOPY, INSERT STENT URETER(S);  Cystoscopy Bilateral ureteral Stent Placement.;  Surgeon: Justin Henry MD;  [facility]     COMBINED CYSTOSCOPY, RETROGRADES, URETEROSCOPY, INSERT STENT Bilateral 4/3/2018    Procedure: COMBINED CYSTOSCOPY, RETROGRADES, URETEROSCOPY, INSERT STENT;;  Surgeon: Stuart King MD;  [facility]     COMBINED CYSTOSCOPY, RETROGRADES, URETEROSCOPY, INSERT STENT Bilateral 9/10/2018    Procedure: COMBINED CYSTOSCOPY, RETROGRADES, URETEROSCOPY, INSERT STENT;  Cystoscopy, Bilateral Ureteroscopy, Bladder Biopsies, Retrogram Pyelograms, Ureteral Washings and brushings, cysview;  Surgeon: Stuart King MD;  [facility]     COMBINED CYSTOSCOPY, RETROGRADES, URETEROSCOPY, INSERT STENT Left 8/26/2020    Procedure: Cystoscopy, Left Ureteral Wash, Left ureteral Retrograde Pyelogram;  Surgeon: Justin Henry MD;  [facility]     CYSTOSCOPY, BIOPSY BLADDER INSTILL OPTICAL AGENT N/A 4/3/2018    Procedure: CYSTOSCOPY, BIOPSY BLADDER INSTILL OPTICAL AGENT;  Cystoscopy,  Blue Light Cystoscopy, Bladder Biopsies, Bilateral Selective ureteral washings for Cytology, Bilateral Retrograde Pyelograms, Bilateral Ureteroscopy;  Surgeon: Stuart King MD;  [facility]     CYSTOSCOPY, BIOPSY BLADDER, COMBINED N/A 2/19/2018    Procedure: COMBINED CYSTOSCOPY, BIOPSY BLADDER;  Cystoscopy, Bladder Biopsy;  Surgeon: Kenna La MD;  [facility]     CYSTOSCOPY, BIOPSY BLADDER, COMBINED N/A 8/26/2020    Procedure: Cystoscopy, biopsy bladder, combined;  Surgeon: Justin Henry MD;  [facility]     CYSTOSCOPY, FULGURATE BLADDER TUMOR, COMBINED N/A 1/13/2020    Procedure: CYSTOSCOPY, WITH  bladder biopsies and fulguration;  Surgeon: Stuart Knig MD;  [facility]     CYSTOSCOPY, REMOVE STENT(S), COMBINED  11/13/2018    Procedure: Flexible Cystoscopy with Stent Removal;  Surgeon: Stuart King MD;  [facility]     DAVINCI LYMPHADENECTOMY N/A 11/13/2018    Procedure: Davinci Lymphadenectomy ;  Surgeon: Stuart King MD;  [facility]     DAVINCI NEPHROURETERECTOMY N/A 11/13/2018    Procedure: Right DaVinci Assisted Nephroureterectomy;  Surgeon: Stuart King MD;  [facility]     ENDOSCOPIC ULTRASOUND LOWER GASTROINTESTIONAL TRACT (GI) N/A 10/30/2015    Procedure: ENDOSCOPIC ULTRASOUND LOWER GASTROINTESTIONAL TRACT (GI);  Surgeon: Daniel Jean-Baptiste MD;  [facility]     EYE SURGERY  12/4/17     INSERT PORT VASCULAR ACCESS Right 12/19/2018    Procedure: Chest Port Placement - right;  Surgeon: Stuart Chavez PA-C;  [facility]     IR CHEST PORT PLACEMENT > 5 YRS OF AGE  12/19/2018     IR PORT REMOVAL RIGHT  6/26/2019     LAPAROSCOPIC CHOLECYSTECTOMY WITH CHOLANGIOGRAMS N/A 11/1/2015    Procedure: LAPAROSCOPIC CHOLECYSTECTOMY WITH CHOLANGIOGRAMS;  Surgeon: Tonie Warren MD;  [facility]     REMOVE PORT VASCULAR ACCESS Right 6/26/2019    Procedure: Right Port Removal;   Surgeon: Froilan Irizarry PA-C;  [facility]     SURGICAL HISTORY OF -   1996    malignant melanoma     SURGICAL HISTORY OF -   1968    thyroid nodule     SURGICAL HISTORY OF -       D & C     ZZC NONSPECIFIC PROCEDURE  2005    colonoscopy polyp repeat 2010       Problem list:  Patient Active Problem List    Diagnosis Date Noted     Morbid obesity (H) 11/17/2021     Priority: Medium     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     Priority: Medium     Reviewed CT scans, has been stable and unchanged since 2019  Last scan in 2021       Atrial fibrillation with rapid ventricular response (H) 09/01/2021     Priority: Medium     Chronic kidney disease, stage 3 (H) 02/23/2021     Priority: Medium     Primary osteoarthritis of right knee 10/09/2020     Priority: Medium     Added automatically from request for surgery [id number]       Malignant neoplasm of overlapping sites of bladder (H) 08/19/2020     Priority: Medium     Added automatically from request for surgery [id number]       Urothelial cancer (H) 12/17/2019     Priority: Medium     Added automatically from request for surgery [id number]       Primary localized osteoarthritis of right knee 09/25/2019     Priority: Medium     Added automatically from request for surgery [id number]       Encounter for antineoplastic chemotherapy 01/09/2019     Priority: Medium     Graves disease 12/04/2018     Priority: Medium     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     Priority: Medium     CRESENCIO (acute kidney injury) (H) 09/11/2018     Priority: Medium     Hydronephrosis 09/11/2018     Priority: Medium     Hyperthyroidism 06/28/2018     Priority: Medium     Lipedematous scalp 06/01/2018     Priority: Medium     Urothelial carcinoma (H) 03/22/2018     Priority: Medium     Chronic systolic heart failure (H) 02/20/2018     Priority: Medium     Anxiety 02/07/2018     Priority: Medium     A-fib (H) 01/16/2018     Priority: Medium     Stress-induced cardiomyopathy  12/14/2017     Priority: Medium     NSTEMI (non-ST elevated myocardial infarction) (H) 12/13/2017     Priority: Medium     Iron deficiency anemia, unspecified iron deficiency anemia type 11/30/2017     Priority: Medium     Obesity, unspecified obesity severity, unspecified obesity type 02/22/2017     Priority: Medium     Chronic bilateral low back pain without sciatica 12/05/2016     Priority: Medium     Impaired fasting blood sugar 11/24/2015     Priority: Medium     Shoulder pain 10/31/2014     Priority: Medium     High risk medication use 10/15/2014     Priority: Medium     Polymyalgia rheumatica (H) 07/10/2014     Priority: Medium     Hip arthritis 06/23/2014     Priority: Medium     Hypertension goal BP (blood pressure) < 140/90 11/30/2011     Priority: Medium     Urinary incontinence 11/30/2011     Priority: Medium     Hyperlipidemia LDL goal <130 05/09/2010     Priority: Medium     Nontoxic multinodular goiter      Priority: Medium     Malignant melanoma of skin of trunk, except scrotum (H)      Priority: Medium     perirectal cyst 12/16/2005     Priority: Medium     Restless leg syndrome 10/12/2005     Priority: Medium     heat intolerance 10/12/2005     Priority: Medium     Goiter 10/12/2005     Priority: Medium     Problem list name updated by automated process. Provider to review       Disorder of bone and cartilage 10/12/2005     Priority: Medium     Problem list name updated by automated process. Provider to review       Other psoriasis 10/12/2005     Priority: Medium     Esophageal reflux 09/22/2004     Priority: Medium       Medications:  Current Outpatient Medications   Medication Sig Dispense Refill     alendronate (FOSAMAX) 70 MG tablet TAKE 1 TABLET EVERY 7 DAYS AT LEAST 60 MINUTES BEFORE BREAKFAST AS DIRECTED. 12 tablet 3     amoxicillin (AMOXIL) 500 MG tablet Take 4 tablets (2000 mg) by mouth 1 hour before dental procedures/cleanings. 4 tablet 3     bisacodyl (DULCOLAX) 5 MG EC tablet Take 2  tablets by mouth at bedtime two days prior to procedure.  Take 2 tabs by mouth at 3pm one day prior to procedure. 4 tablet 0     diltiazem ER (DILT-XR) 180 MG 24 hr capsule Take 1 capsule (180 mg) by mouth daily 90 capsule 0     ferrous sulfate 325 (65 Fe) MG TBEC EC tablet Take 325 mg by mouth every morning        furosemide (LASIX) 20 MG tablet Take 1 tablet (20 mg) by mouth 2 times daily .  Take in the morning and after lunch. 90 tablet 1     irbesartan (AVAPRO) 300 MG tablet TAKE 1 TABLET EVERY DAY 90 tablet 2     lovastatin (MEVACOR) 40 MG tablet TAKE 1 TABLET AT BEDTIME 90 tablet 0     methimazole (TAPAZOLE) 5 MG tablet Take 5 mg alternating with 2.5 mg every other day 90 tablet 3     Omega-3 Fatty Acids (FISH OIL) 500 MG CAPS Take 1 capsule by mouth every morning        omeprazole (PRILOSEC) 20 MG DR capsule TAKE 1 CAPSULE EVERY DAY 90 capsule 1     ondansetron (ZOFRAN-ODT) 4 MG ODT tab Take 1 tablet (4 mg) by mouth every 6 hours as needed for nausea or vomiting 20 tablet 0     propranolol ER (INDERAL LA) 60 MG 24 hr capsule Take 1 capsule (60 mg) by mouth daily 90 capsule 2     rivaroxaban ANTICOAGULANT (XARELTO) 15 MG TABS tablet Take 1 tablet (15 mg) by mouth daily (with dinner) 90 tablet 0     rOPINIRole (REQUIP) 0.25 MG tablet TAKE 1 TABLET IN THE AFTERNOON AND TAKE 2 TABLETS EVERY NIGHT 270 tablet 3     sertraline (ZOLOFT) 100 MG tablet TAKE 1 TABLET EVERY MORNING 90 tablet 3     traZODone (DESYREL) 50 MG tablet TAKE 1/2 TABLET AT BEDTIME 45 tablet 3     diltiazem ER COATED BEADS (CARDIZEM CD/CARTIA XT) 180 MG 24 hr capsule  (Patient not taking: Reported on 11/29/2021)         Allergies:  Allergies   Allergen Reactions     Codeine        Family history:  Family History   Problem Relation Age of Onset     Cancer Father         dec - esophageal and laryngeal     Heart Disease Mother      Respiratory Mother         dec     Breast Cancer Daughter      Other Cancer Daughter      Thyroid Disease Daughter       "Asthma Daughter      Hyperlipidemia Son      Diabetes Son      Anesthesia Reaction No family hx of      Deep Vein Thrombosis (DVT) No family hx of        Social history:  Social History     Tobacco Use     Smoking status: Never Smoker     Smokeless tobacco: Never Used   Substance Use Topics     Alcohol use: No     Alcohol/week: 0.0 standard drinks     Comment: rare    Marital status: .    Nursing Notes:   Chary Phillips CMA  11/29/2021 12:03 PM  Signed  Chief Complaint   Patient presents with     New Patient     New consult for rectal bleeding.       Vitals:    11/29/21 1157   BP: 130/67   BP Location: Left arm   Patient Position: Sitting   Cuff Size: Adult Regular   Pulse: 81   Temp: 98.1  F (36.7  C)   TempSrc: Oral   SpO2: 95%   Weight: 170 lb 1.6 oz   Height: 4' 9\"       Body mass index is 36.81 kg/m .       Chary Aguilar CMA         60 minutes spent on the date of the encounter doing chart review, history and exam, documentation and further activities as noted above.     NELSON Hernandez, NP-C  Colon and Rectal Surgery   [facility]    This note was created using speech recognition software and may contain unintended word substitutions.        Again, thank you for allowing me to participate in the care of your patient.      Sincerely,    NELSON Hernandez CNP      "

## 2021-11-29 NOTE — PATIENT INSTRUCTIONS
1. Metamucil daily and add Miralax daily for any constipation  2. If bleeding returns would need to consider full colonoscopy versus mini prep at home with repeat flexible sigmoidoscopy in clinic

## 2021-11-29 NOTE — PROGRESS NOTES
"Colon and Rectal Surgery Consult Clinic Note    Date: 2021     Referring provider:  Pop Zapien MD  [address] [place] [id number]     RE: Dimple Oviedo  : 1933  MADY: 2021    Dimple Oviedo is a very pleasant 88 year old female who presents today for rectal bleeding.    HPI:  Dimple presents today with her son.  She developed blood with a bowel movement in October.  This was with the stool only not with wiping or in the toilet bowl.  She was scheduled for a colonoscopy but this was not completed due to atrial fibrillation.  She is currently on Xarelto for this and Dr. Leventhal recommended an outpatient flexible sigmoidoscopy instead.  Her last colonoscopy in  was normal.  She developed a recurrence of bleeding on  and has had some black stools but is on an iron supplement.  No pain with bowel movements.  She does strain with bowel movements and these are often hard.  No change in stool caliber.  Does have some chronic anemia with hemoglobin of 11.4.  History of bladder cancer and she is on maintenance BCG.  She and her son both state that she is not interested in having a colonoscopy at this time.    Physical Examination:  /67 (BP Location: Left arm, Patient Position: Sitting, Cuff Size: Adult Regular)   Pulse 81   Temp 98.1  F (36.7  C) (Oral)   Ht 4' 9\"   Wt 170 lb 1.6 oz   SpO2 95%   BMI 36.81 kg/m    General: Alert, oriented, in no acute distress, sitting comfortably  HEENT: Mucous membranes moist    Perianal external examination: Exam was chaperoned by Chary Aguilar MA   Perianal skin: Intact with no excoriation or lichenification.  Lesions: No evidence of an external lesion, nodularity, or induration in the perianal region.  Eversion of buttocks: There was not evidence of an anal fissure. Details: N/A.  Skin tags or external hemorrhoids: None.  Digital rectal examination: Was performed.   Sphincter tone: Poor.  Palpable " lesions: No.  Other: hard stool palpable in rectum.  Bimanual examination: was not performed    Anoscopy: Was deferred    Procedures:  After discussing the risks and benefits, the patient agreed to proceed with flexible sigmoidoscopy.    A total of 6 fleet enema(s) were administered for a preparation.The sigmoidoscope was inserted to 30 cm and stopped due to inadequate bowel prep; stool blocking lumen.    Findings: Normal mucosa in the visualized lumen but visualization was poor do the presence of a significant amount of formed stool  Retroflexion: Was performed. This was small internal hemorrhoids without active bleeding.  The patient tolerated the procedure well.     Assessment/Plan: 88 year old female rectal bleeding and anemia.  She really has constipation I think this is likely causing irritation to her hemorrhoids and rectal bleeding.  No bleeding on exam.  Flexible sigmoidoscopy was performed but was very limited due to formed stool throughout the rectum and sigmoid despite 6 enemas.  Recommended starting a daily fiber supplement at a laxative in addition as needed for constipation.  If bleeding returns, she will either need to do a mini prep at home with repeat flexible sigmoidoscopy or consider a full colonoscopy. Patient's questions were answered to her stated satisfaction and she is in agreement with this plan.    Medical history:  Past Medical History:   Diagnosis Date     Arthritis      Calculus of kidney      Chemotherapy-induced neutropenia (H) 3/6/2019     Congestive heart failure (H)      Esophageal reflux      GERD (gastroesophageal reflux disease)      Hyperlipidemia LDL goal <130 5/9/2010     Malignant melanoma of skin of trunk, except scrotum (H)      Nonspecific abnormal finding     has living will 2004 -      Nontoxic multinodular goiter     no further eval /tx rec per pt     Osteopenia      Other psoriasis      Personal history of colonic polyps      PMR (polymyalgia rheumatica) (H)       Restless legs syndrome 7/11/2018     Stress-induced cardiomyopathy      Undiagnosed cardiac murmurs      Unspecified constipation      Unspecified essential hypertension      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       Surgical history:  Past Surgical History:   Procedure Laterality Date     ARTHROPLASTY KNEE Right 11/9/2020    Procedure: ARTHROPLASTY, KNEE, TOTAL, RIGHT;  Surgeon: Edward Otero MD;  [facility]     BIOPSY       COLONOSCOPY  2014     COMBINED CYSTOSCOPY, INSERT STENT URETER(S) Bilateral 9/12/2018    Procedure: COMBINED CYSTOSCOPY, INSERT STENT URETER(S);  Cystoscopy Bilateral ureteral Stent Placement.;  Surgeon: Justin Henry MD;  [facility]     COMBINED CYSTOSCOPY, RETROGRADES, URETEROSCOPY, INSERT STENT Bilateral 4/3/2018    Procedure: COMBINED CYSTOSCOPY, RETROGRADES, URETEROSCOPY, INSERT STENT;;  Surgeon: Stuart King MD;  [facility]     COMBINED CYSTOSCOPY, RETROGRADES, URETEROSCOPY, INSERT STENT Bilateral 9/10/2018    Procedure: COMBINED CYSTOSCOPY, RETROGRADES, URETEROSCOPY, INSERT STENT;  Cystoscopy, Bilateral Ureteroscopy, Bladder Biopsies, Retrogram Pyelograms, Ureteral Washings and brushings, cysview;  Surgeon: Stuart King MD;  [facility]     COMBINED CYSTOSCOPY, RETROGRADES, URETEROSCOPY, INSERT STENT Left 8/26/2020    Procedure: Cystoscopy, Left Ureteral Wash, Left ureteral Retrograde Pyelogram;  Surgeon: Justin Henry MD;  [facility]     CYSTOSCOPY, BIOPSY BLADDER INSTILL OPTICAL AGENT N/A 4/3/2018    Procedure: CYSTOSCOPY, BIOPSY BLADDER INSTILL OPTICAL AGENT;  Cystoscopy, Blue Light Cystoscopy, Bladder Biopsies, Bilateral Selective ureteral washings for Cytology, Bilateral Retrograde Pyelograms, Bilateral Ureteroscopy;  Surgeon: Stuart King MD;  [facility]     CYSTOSCOPY, BIOPSY BLADDER, COMBINED N/A 2/19/2018    Procedure: COMBINED CYSTOSCOPY, BIOPSY BLADDER;  Cystoscopy, Bladder Biopsy;   Surgeon: Kenna La MD;  [facility]     CYSTOSCOPY, BIOPSY BLADDER, COMBINED N/A 8/26/2020    Procedure: Cystoscopy, biopsy bladder, combined;  Surgeon: Justin Henry MD;  [facility]     CYSTOSCOPY, FULGURATE BLADDER TUMOR, COMBINED N/A 1/13/2020    Procedure: CYSTOSCOPY, WITH  bladder biopsies and fulguration;  Surgeon: Stuart King MD;  [facility]     CYSTOSCOPY, REMOVE STENT(S), COMBINED  11/13/2018    Procedure: Flexible Cystoscopy with Stent Removal;  Surgeon: Stuart King MD;  [facility]     DAVINCI LYMPHADENECTOMY N/A 11/13/2018    Procedure: Davinci Lymphadenectomy ;  Surgeon: Stuart King MD;  [facility]     DAVINCI NEPHROURETERECTOMY N/A 11/13/2018    Procedure: Right DaVinci Assisted Nephroureterectomy;  Surgeon: Stuart King MD;  [facility]     ENDOSCOPIC ULTRASOUND LOWER GASTROINTESTIONAL TRACT (GI) N/A 10/30/2015    Procedure: ENDOSCOPIC ULTRASOUND LOWER GASTROINTESTIONAL TRACT (GI);  Surgeon: Daniel Jean-Baptiste MD;  [facility]     EYE SURGERY  12/4/17     INSERT PORT VASCULAR ACCESS Right 12/19/2018    Procedure: Chest Port Placement - right;  Surgeon: Stuart Chavez PA-C;  [facility]     IR CHEST PORT PLACEMENT > 5 YRS OF AGE  12/19/2018     IR PORT REMOVAL RIGHT  6/26/2019     LAPAROSCOPIC CHOLECYSTECTOMY WITH CHOLANGIOGRAMS N/A 11/1/2015    Procedure: LAPAROSCOPIC CHOLECYSTECTOMY WITH CHOLANGIOGRAMS;  Surgeon: Tonie Warren MD;  [facility]     REMOVE PORT VASCULAR ACCESS Right 6/26/2019    Procedure: Right Port Removal;  Surgeon: Froilan Irizarry PA-C;  [facility]     SURGICAL HISTORY OF -   1996    malignant melanoma     SURGICAL HISTORY OF -   1968    thyroid nodule     SURGICAL HISTORY OF -       D & C     ZZC NONSPECIFIC PROCEDURE  2005    colonoscopy polyp repeat 2010       Problem list:  Patient Active Problem List    Diagnosis Date Noted      Morbid obesity (H) 11/17/2021     Priority: Medium     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     Priority: Medium     Reviewed CT scans, has been stable and unchanged since 2019  Last scan in 2021       Atrial fibrillation with rapid ventricular response (H) 09/01/2021     Priority: Medium     Chronic kidney disease, stage 3 (H) 02/23/2021     Priority: Medium     Primary osteoarthritis of right knee 10/09/2020     Priority: Medium     Added automatically from request for surgery [id number]       Malignant neoplasm of overlapping sites of bladder (H) 08/19/2020     Priority: Medium     Added automatically from request for surgery [id number]       Urothelial cancer (H) 12/17/2019     Priority: Medium     Added automatically from request for surgery [id number]       Primary localized osteoarthritis of right knee 09/25/2019     Priority: Medium     Added automatically from request for surgery [id number]       Encounter for antineoplastic chemotherapy 01/09/2019     Priority: Medium     Graves disease 12/04/2018     Priority: Medium     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     Priority: Medium     CRESENCIO (acute kidney injury) (H) 09/11/2018     Priority: Medium     Hydronephrosis 09/11/2018     Priority: Medium     Hyperthyroidism 06/28/2018     Priority: Medium     Lipedematous scalp 06/01/2018     Priority: Medium     Urothelial carcinoma (H) 03/22/2018     Priority: Medium     Chronic systolic heart failure (H) 02/20/2018     Priority: Medium     Anxiety 02/07/2018     Priority: Medium     A-fib (H) 01/16/2018     Priority: Medium     Stress-induced cardiomyopathy 12/14/2017     Priority: Medium     NSTEMI (non-ST elevated myocardial infarction) (H) 12/13/2017     Priority: Medium     Iron deficiency anemia, unspecified iron deficiency anemia type 11/30/2017     Priority: Medium     Obesity, unspecified obesity severity, unspecified obesity type 02/22/2017     Priority: Medium     Chronic bilateral low back  pain without sciatica 12/05/2016     Priority: Medium     Impaired fasting blood sugar 11/24/2015     Priority: Medium     Shoulder pain 10/31/2014     Priority: Medium     High risk medication use 10/15/2014     Priority: Medium     Polymyalgia rheumatica (H) 07/10/2014     Priority: Medium     Hip arthritis 06/23/2014     Priority: Medium     Hypertension goal BP (blood pressure) < 140/90 11/30/2011     Priority: Medium     Urinary incontinence 11/30/2011     Priority: Medium     Hyperlipidemia LDL goal <130 05/09/2010     Priority: Medium     Nontoxic multinodular goiter      Priority: Medium     Malignant melanoma of skin of trunk, except scrotum (H)      Priority: Medium     perirectal cyst 12/16/2005     Priority: Medium     Restless leg syndrome 10/12/2005     Priority: Medium     heat intolerance 10/12/2005     Priority: Medium     Goiter 10/12/2005     Priority: Medium     Problem list name updated by automated process. Provider to review       Disorder of bone and cartilage 10/12/2005     Priority: Medium     Problem list name updated by automated process. Provider to review       Other psoriasis 10/12/2005     Priority: Medium     Esophageal reflux 09/22/2004     Priority: Medium       Medications:  Current Outpatient Medications   Medication Sig Dispense Refill     alendronate (FOSAMAX) 70 MG tablet TAKE 1 TABLET EVERY 7 DAYS AT LEAST 60 MINUTES BEFORE BREAKFAST AS DIRECTED. 12 tablet 3     amoxicillin (AMOXIL) 500 MG tablet Take 4 tablets (2000 mg) by mouth 1 hour before dental procedures/cleanings. 4 tablet 3     bisacodyl (DULCOLAX) 5 MG EC tablet Take 2 tablets by mouth at bedtime two days prior to procedure.  Take 2 tabs by mouth at 3pm one day prior to procedure. 4 tablet 0     diltiazem ER (DILT-XR) 180 MG 24 hr capsule Take 1 capsule (180 mg) by mouth daily 90 capsule 0     ferrous sulfate 325 (65 Fe) MG TBEC EC tablet Take 325 mg by mouth every morning        furosemide (LASIX) 20 MG tablet Take  1 tablet (20 mg) by mouth 2 times daily .  Take in the morning and after lunch. 90 tablet 1     irbesartan (AVAPRO) 300 MG tablet TAKE 1 TABLET EVERY DAY 90 tablet 2     lovastatin (MEVACOR) 40 MG tablet TAKE 1 TABLET AT BEDTIME 90 tablet 0     methimazole (TAPAZOLE) 5 MG tablet Take 5 mg alternating with 2.5 mg every other day 90 tablet 3     Omega-3 Fatty Acids (FISH OIL) 500 MG CAPS Take 1 capsule by mouth every morning        omeprazole (PRILOSEC) 20 MG DR capsule TAKE 1 CAPSULE EVERY DAY 90 capsule 1     ondansetron (ZOFRAN-ODT) 4 MG ODT tab Take 1 tablet (4 mg) by mouth every 6 hours as needed for nausea or vomiting 20 tablet 0     propranolol ER (INDERAL LA) 60 MG 24 hr capsule Take 1 capsule (60 mg) by mouth daily 90 capsule 2     rivaroxaban ANTICOAGULANT (XARELTO) 15 MG TABS tablet Take 1 tablet (15 mg) by mouth daily (with dinner) 90 tablet 0     rOPINIRole (REQUIP) 0.25 MG tablet TAKE 1 TABLET IN THE AFTERNOON AND TAKE 2 TABLETS EVERY NIGHT 270 tablet 3     sertraline (ZOLOFT) 100 MG tablet TAKE 1 TABLET EVERY MORNING 90 tablet 3     traZODone (DESYREL) 50 MG tablet TAKE 1/2 TABLET AT BEDTIME 45 tablet 3     diltiazem ER COATED BEADS (CARDIZEM CD/CARTIA XT) 180 MG 24 hr capsule  (Patient not taking: Reported on 11/29/2021)         Allergies:  Allergies   Allergen Reactions     Codeine        Family history:  Family History   Problem Relation Age of Onset     Cancer Father         dec - esophageal and laryngeal     Heart Disease Mother      Respiratory Mother         dec     Breast Cancer Daughter      Other Cancer Daughter      Thyroid Disease Daughter      Asthma Daughter      Hyperlipidemia Son      Diabetes Son      Anesthesia Reaction No family hx of      Deep Vein Thrombosis (DVT) No family hx of        Social history:  Social History     Tobacco Use     Smoking status: Never Smoker     Smokeless tobacco: Never Used   Substance Use Topics     Alcohol use: No     Alcohol/week: 0.0 standard drinks      "Comment: rare    Marital status: .    Nursing Notes:   Chary Phillips CMA  11/29/2021 12:03 PM  Signed  Chief Complaint   Patient presents with     New Patient     New consult for rectal bleeding.       Vitals:    11/29/21 1157   BP: 130/67   BP Location: Left arm   Patient Position: Sitting   Cuff Size: Adult Regular   Pulse: 81   Temp: 98.1  F (36.7  C)   TempSrc: Oral   SpO2: 95%   Weight: 170 lb 1.6 oz   Height: 4' 9\"       Body mass index is 36.81 kg/m .       Chary Aguilar CMA         60 minutes spent on the date of the encounter doing chart review, history and exam, documentation and further activities as noted above.     NELSON Hernandez, NP-C  Colon and Rectal Surgery   [facility]    This note was created using speech recognition software and may contain unintended word substitutions.    "

## 2021-11-29 NOTE — NURSING NOTE
"Chief Complaint   Patient presents with     New Patient     New consult for rectal bleeding.       Vitals:    11/29/21 1157   BP: 130/67   BP Location: Left arm   Patient Position: Sitting   Cuff Size: Adult Regular   Pulse: 81   Temp: 98.1  F (36.7  C)   TempSrc: Oral   SpO2: 95%   Weight: 170 lb 1.6 oz   Height: 4' 9\"       Body mass index is 36.81 kg/m .       Chary Aguilar CMA    "

## 2021-11-29 NOTE — LETTER
"2021      RE: Dimple Oviedo  [address]  [facility] [address]       Colon and Rectal Surgery Consult Clinic Note    Date: 2021     Referring provider:  Pop Zapien MD  [address] [place] [address]     RE: Dimple Oviedo  : 1933  MADY: 2021    Dimple Oviedo is a very pleasant 88 year old female who presents today for rectal bleeding.    HPI:  Dimple presents today with her son.  She developed blood with a bowel movement in October.  This was with the stool only not with wiping or in the toilet bowl.  She was scheduled for a colonoscopy but this was not completed due to atrial fibrillation.  She is currently on Xarelto for this and Dr. Leventhal recommended an outpatient flexible sigmoidoscopy instead.  Her last colonoscopy in  was normal.  She developed a recurrence of bleeding on  and has had some black stools but is on an iron supplement.  No pain with bowel movements.  She does strain with bowel movements and these are often hard.  No change in stool caliber.  Does have some chronic anemia with hemoglobin of 11.4.  History of bladder cancer and she is on maintenance BCG.  She and her son both state that she is not interested in having a colonoscopy at this time.    Physical Examination:  /67 (BP Location: Left arm, Patient Position: Sitting, Cuff Size: Adult Regular)   Pulse 81   Temp 98.1  F (36.7  C) (Oral)   Ht 4' 9\"   Wt 170 lb 1.6 oz   SpO2 95%   BMI 36.81 kg/m    General: Alert, oriented, in no acute distress, sitting comfortably  HEENT: Mucous membranes moist    Perianal external examination: Exam was chaperoned by Chary Aguilar MA   Perianal skin: Intact with no excoriation or lichenification.  Lesions: No evidence of an external lesion, nodularity, or induration in the perianal region.  Eversion of buttocks: There was not evidence of an anal fissure. Details: N/A.  Skin tags or external " hemorrhoids: None.  Digital rectal examination: Was performed.   Sphincter tone: Poor.  Palpable lesions: No.  Other: hard stool palpable in rectum.  Bimanual examination: was not performed    Anoscopy: Was deferred    Procedures:  After discussing the risks and benefits, the patient agreed to proceed with flexible sigmoidoscopy.    A total of 6 fleet enema(s) were administered for a preparation.The sigmoidoscope was inserted to 30 cm and stopped due to inadequate bowel prep; stool blocking lumen.    Findings: Normal mucosa in the visualized lumen but visualization was poor do the presence of a significant amount of formed stool  Retroflexion: Was performed. This was small internal hemorrhoids without active bleeding.  The patient tolerated the procedure well.     Assessment/Plan: 88 year old female rectal bleeding and anemia.  She really has constipation I think this is likely causing irritation to her hemorrhoids and rectal bleeding.  No bleeding on exam.  Flexible sigmoidoscopy was performed but was very limited due to formed stool throughout the rectum and sigmoid despite 6 enemas.  Recommended starting a daily fiber supplement at a laxative in addition as needed for constipation.  If bleeding returns, she will either need to do a mini prep at home with repeat flexible sigmoidoscopy or consider a full colonoscopy. Patient's questions were answered to her stated satisfaction and she is in agreement with this plan.    Medical history:  Past Medical History:   Diagnosis Date     Arthritis      Calculus of kidney      Chemotherapy-induced neutropenia (H) 3/6/2019     Congestive heart failure (H)      Esophageal reflux      GERD (gastroesophageal reflux disease)      Hyperlipidemia LDL goal <130 5/9/2010     Malignant melanoma of skin of trunk, except scrotum (H)      Nonspecific abnormal finding     has living will 2004 -      Nontoxic multinodular goiter     no further eval /tx rec per pt     Osteopenia      Other  psoriasis      Personal history of colonic polyps      PMR (polymyalgia rheumatica) (H)      Restless legs syndrome 7/11/2018     Stress-induced cardiomyopathy      Undiagnosed cardiac murmurs      Unspecified constipation      Unspecified essential hypertension      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       Surgical history:  Past Surgical History:   Procedure Laterality Date     ARTHROPLASTY KNEE Right 11/9/2020    Procedure: ARTHROPLASTY, KNEE, TOTAL, RIGHT;  Surgeon: Edward Otero MD;  [facility]     BIOPSY       COLONOSCOPY  2014     COMBINED CYSTOSCOPY, INSERT STENT URETER(S) Bilateral 9/12/2018    Procedure: COMBINED CYSTOSCOPY, INSERT STENT URETER(S);  Cystoscopy Bilateral ureteral Stent Placement.;  Surgeon: Justin Henry MD;  [facility]     COMBINED CYSTOSCOPY, RETROGRADES, URETEROSCOPY, INSERT STENT Bilateral 4/3/2018    Procedure: COMBINED CYSTOSCOPY, RETROGRADES, URETEROSCOPY, INSERT STENT;;  Surgeon: Stuart King MD;  [facility]     COMBINED CYSTOSCOPY, RETROGRADES, URETEROSCOPY, INSERT STENT Bilateral 9/10/2018    Procedure: COMBINED CYSTOSCOPY, RETROGRADES, URETEROSCOPY, INSERT STENT;  Cystoscopy, Bilateral Ureteroscopy, Bladder Biopsies, Retrogram Pyelograms, Ureteral Washings and brushings, cysview;  Surgeon: Stuart King MD;  [facility]     COMBINED CYSTOSCOPY, RETROGRADES, URETEROSCOPY, INSERT STENT Left 8/26/2020    Procedure: Cystoscopy, Left Ureteral Wash, Left ureteral Retrograde Pyelogram;  Surgeon: Justin Henry MD;  [facility]     CYSTOSCOPY, BIOPSY BLADDER INSTILL OPTICAL AGENT N/A 4/3/2018    Procedure: CYSTOSCOPY, BIOPSY BLADDER INSTILL OPTICAL AGENT;  Cystoscopy, Blue Light Cystoscopy, Bladder Biopsies, Bilateral Selective ureteral washings for Cytology, Bilateral Retrograde Pyelograms, Bilateral Ureteroscopy;  Surgeon: Stuart King MD;  [facility]     CYSTOSCOPY, BIOPSY BLADDER, COMBINED N/A  2/19/2018    Procedure: COMBINED CYSTOSCOPY, BIOPSY BLADDER;  Cystoscopy, Bladder Biopsy;  Surgeon: Kenna La MD;  [facility]     CYSTOSCOPY, BIOPSY BLADDER, COMBINED N/A 8/26/2020    Procedure: Cystoscopy, biopsy bladder, combined;  Surgeon: Justin Henry MD;  [facility]     CYSTOSCOPY, FULGURATE BLADDER TUMOR, COMBINED N/A 1/13/2020    Procedure: CYSTOSCOPY, WITH  bladder biopsies and fulguration;  Surgeon: Stuart King MD;  [facility]     CYSTOSCOPY, REMOVE STENT(S), COMBINED  11/13/2018    Procedure: Flexible Cystoscopy with Stent Removal;  Surgeon: Stuart King MD;  [facility]     DAVINCI LYMPHADENECTOMY N/A 11/13/2018    Procedure: Davinci Lymphadenectomy ;  Surgeon: Stuart King MD;  [facility]     DAVINCI NEPHROURETERECTOMY N/A 11/13/2018    Procedure: Right DaVinci Assisted Nephroureterectomy;  Surgeon: Stuart King MD;  [facility]     ENDOSCOPIC ULTRASOUND LOWER GASTROINTESTIONAL TRACT (GI) N/A 10/30/2015    Procedure: ENDOSCOPIC ULTRASOUND LOWER GASTROINTESTIONAL TRACT (GI);  Surgeon: Daniel Jean-Baptiste MD;  [facility]     EYE SURGERY  12/4/17     INSERT PORT VASCULAR ACCESS Right 12/19/2018    Procedure: Chest Port Placement - right;  Surgeon: Stuart Chavez PA-C;  [facility]     IR CHEST PORT PLACEMENT > 5 YRS OF AGE  12/19/2018     IR PORT REMOVAL RIGHT  6/26/2019     LAPAROSCOPIC CHOLECYSTECTOMY WITH CHOLANGIOGRAMS N/A 11/1/2015    Procedure: LAPAROSCOPIC CHOLECYSTECTOMY WITH CHOLANGIOGRAMS;  Surgeon: Tonie Warren MD;  [facility]     REMOVE PORT VASCULAR ACCESS Right 6/26/2019    Procedure: Right Port Removal;  Surgeon: Froilan Irizarry PA-C;  [facility]     SURGICAL HISTORY OF -   1996    malignant melanoma     SURGICAL HISTORY OF -   1968    thyroid nodule     SURGICAL HISTORY OF -       D & C     ZZC NONSPECIFIC PROCEDURE  2005    colonoscopy polyp  repeat 2010       Problem list:  Patient Active Problem List    Diagnosis Date Noted     Morbid obesity (H) 11/17/2021     Priority: Medium     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     Priority: Medium     Reviewed CT scans, has been stable and unchanged since 2019  Last scan in 2021       Atrial fibrillation with rapid ventricular response (H) 09/01/2021     Priority: Medium     Chronic kidney disease, stage 3 (H) 02/23/2021     Priority: Medium     Primary osteoarthritis of right knee 10/09/2020     Priority: Medium     Added automatically from request for surgery [id number]       Malignant neoplasm of overlapping sites of bladder (H) 08/19/2020     Priority: Medium     Added automatically from request for surgery [id number]       Urothelial cancer (H) 12/17/2019     Priority: Medium     Added automatically from request for surgery [id number]       Primary localized osteoarthritis of right knee 09/25/2019     Priority: Medium     Added automatically from request for surgery [id number]       Encounter for antineoplastic chemotherapy 01/09/2019     Priority: Medium     Graves disease 12/04/2018     Priority: Medium     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     Priority: Medium     CRESENCIO (acute kidney injury) (H) 09/11/2018     Priority: Medium     Hydronephrosis 09/11/2018     Priority: Medium     Hyperthyroidism 06/28/2018     Priority: Medium     Lipedematous scalp 06/01/2018     Priority: Medium     Urothelial carcinoma (H) 03/22/2018     Priority: Medium     Chronic systolic heart failure (H) 02/20/2018     Priority: Medium     Anxiety 02/07/2018     Priority: Medium     A-fib (H) 01/16/2018     Priority: Medium     Stress-induced cardiomyopathy 12/14/2017     Priority: Medium     NSTEMI (non-ST elevated myocardial infarction) (H) 12/13/2017     Priority: Medium     Iron deficiency anemia, unspecified iron deficiency anemia type 11/30/2017     Priority: Medium     Obesity, unspecified obesity severity,  unspecified obesity type 02/22/2017     Priority: Medium     Chronic bilateral low back pain without sciatica 12/05/2016     Priority: Medium     Impaired fasting blood sugar 11/24/2015     Priority: Medium     Shoulder pain 10/31/2014     Priority: Medium     High risk medication use 10/15/2014     Priority: Medium     Polymyalgia rheumatica (H) 07/10/2014     Priority: Medium     Hip arthritis 06/23/2014     Priority: Medium     Hypertension goal BP (blood pressure) < 140/90 11/30/2011     Priority: Medium     Urinary incontinence 11/30/2011     Priority: Medium     Hyperlipidemia LDL goal <130 05/09/2010     Priority: Medium     Nontoxic multinodular goiter      Priority: Medium     Malignant melanoma of skin of trunk, except scrotum (H)      Priority: Medium     perirectal cyst 12/16/2005     Priority: Medium     Restless leg syndrome 10/12/2005     Priority: Medium     heat intolerance 10/12/2005     Priority: Medium     Goiter 10/12/2005     Priority: Medium     Problem list name updated by automated process. Provider to review       Disorder of bone and cartilage 10/12/2005     Priority: Medium     Problem list name updated by automated process. Provider to review       Other psoriasis 10/12/2005     Priority: Medium     Esophageal reflux 09/22/2004     Priority: Medium       Medications:  Current Outpatient Medications   Medication Sig Dispense Refill     alendronate (FOSAMAX) 70 MG tablet TAKE 1 TABLET EVERY 7 DAYS AT LEAST 60 MINUTES BEFORE BREAKFAST AS DIRECTED. 12 tablet 3     amoxicillin (AMOXIL) 500 MG tablet Take 4 tablets (2000 mg) by mouth 1 hour before dental procedures/cleanings. 4 tablet 3     bisacodyl (DULCOLAX) 5 MG EC tablet Take 2 tablets by mouth at bedtime two days prior to procedure.  Take 2 tabs by mouth at 3pm one day prior to procedure. 4 tablet 0     diltiazem ER (DILT-XR) 180 MG 24 hr capsule Take 1 capsule (180 mg) by mouth daily 90 capsule 0     ferrous sulfate 325 (65 Fe) MG TBEC  EC tablet Take 325 mg by mouth every morning        furosemide (LASIX) 20 MG tablet Take 1 tablet (20 mg) by mouth 2 times daily .  Take in the morning and after lunch. 90 tablet 1     irbesartan (AVAPRO) 300 MG tablet TAKE 1 TABLET EVERY DAY 90 tablet 2     lovastatin (MEVACOR) 40 MG tablet TAKE 1 TABLET AT BEDTIME 90 tablet 0     methimazole (TAPAZOLE) 5 MG tablet Take 5 mg alternating with 2.5 mg every other day 90 tablet 3     Omega-3 Fatty Acids (FISH OIL) 500 MG CAPS Take 1 capsule by mouth every morning        omeprazole (PRILOSEC) 20 MG DR capsule TAKE 1 CAPSULE EVERY DAY 90 capsule 1     ondansetron (ZOFRAN-ODT) 4 MG ODT tab Take 1 tablet (4 mg) by mouth every 6 hours as needed for nausea or vomiting 20 tablet 0     propranolol ER (INDERAL LA) 60 MG 24 hr capsule Take 1 capsule (60 mg) by mouth daily 90 capsule 2     rivaroxaban ANTICOAGULANT (XARELTO) 15 MG TABS tablet Take 1 tablet (15 mg) by mouth daily (with dinner) 90 tablet 0     rOPINIRole (REQUIP) 0.25 MG tablet TAKE 1 TABLET IN THE AFTERNOON AND TAKE 2 TABLETS EVERY NIGHT 270 tablet 3     sertraline (ZOLOFT) 100 MG tablet TAKE 1 TABLET EVERY MORNING 90 tablet 3     traZODone (DESYREL) 50 MG tablet TAKE 1/2 TABLET AT BEDTIME 45 tablet 3     diltiazem ER COATED BEADS (CARDIZEM CD/CARTIA XT) 180 MG 24 hr capsule  (Patient not taking: Reported on 11/29/2021)         Allergies:  Allergies   Allergen Reactions     Codeine        Family history:  Family History   Problem Relation Age of Onset     Cancer Father         dec - esophageal and laryngeal     Heart Disease Mother      Respiratory Mother         dec     Breast Cancer Daughter      Other Cancer Daughter      Thyroid Disease Daughter      Asthma Daughter      Hyperlipidemia Son      Diabetes Son      Anesthesia Reaction No family hx of      Deep Vein Thrombosis (DVT) No family hx of        Social history:  Social History     Tobacco Use     Smoking status: Never Smoker     Smokeless tobacco: Never  "Used   Substance Use Topics     Alcohol use: No     Alcohol/week: 0.0 standard drinks     Comment: rare    Marital status: .    Nursing Notes:   Chary Phillips CMA  11/29/2021 12:03 PM  Signed  Chief Complaint   Patient presents with     New Patient     New consult for rectal bleeding.       Vitals:    11/29/21 1157   BP: 130/67   BP Location: Left arm   Patient Position: Sitting   Cuff Size: Adult Regular   Pulse: 81   Temp: 98.1  F (36.7  C)   TempSrc: Oral   SpO2: 95%   Weight: 170 lb 1.6 oz   Height: 4' 9\"       Body mass index is 36.81 kg/m .       Chary Aguilar CMA         60 minutes spent on the date of the encounter doing chart review, history and exam, documentation and further activities as noted above.     NELSON Hernandez, NP-C  Colon and Rectal Surgery   [facility]    This note was created using speech recognition software and may contain unintended word substitutions.        NELSON Hernandez CNP    "

## 2021-12-01 ENCOUNTER — ONCOLOGY VISIT (OUTPATIENT)
Dept: ONCOLOGY | Facility: CLINIC | Age: 86
End: 2021-12-01
Attending: INTERNAL MEDICINE
Payer: MEDICARE

## 2021-12-01 VITALS
WEIGHT: 169.4 LBS | OXYGEN SATURATION: 96 % | DIASTOLIC BLOOD PRESSURE: 72 MMHG | RESPIRATION RATE: 16 BRPM | HEART RATE: 75 BPM | SYSTOLIC BLOOD PRESSURE: 133 MMHG | TEMPERATURE: 98 F | BODY MASS INDEX: 36.66 KG/M2

## 2021-12-01 DIAGNOSIS — E05.00 GRAVES DISEASE: ICD-10-CM

## 2021-12-01 DIAGNOSIS — C66.1 UROTHELIAL CARCINOMA OF RIGHT DISTAL URETER (H): ICD-10-CM

## 2021-12-01 PROCEDURE — 99214 OFFICE O/P EST MOD 30 MIN: CPT | Performed by: INTERNAL MEDICINE

## 2021-12-01 PROCEDURE — G0463 HOSPITAL OUTPT CLINIC VISIT: HCPCS

## 2021-12-01 ASSESSMENT — PAIN SCALES - GENERAL: PAINLEVEL: MILD PAIN (3)

## 2021-12-01 NOTE — PROGRESS NOTES
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    HISTORY OF PRESENT ILLNESS:  Ms. 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 Her oncologic history is as under.    Ms. Oviedo is doing well. She denies any complains suggestive of recurrent disease. She has carpal tunnel symptoms in her left hand and is wearing a brace. She has numbness in the thumb, index and middle finger of her left hand.      ONCOLOGIC HISTORY:  1. 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   - Jan 2018 to September 2018- Persistent gross hematuria and underwent multiple procedures.    - 2/19/18 and 4/3/18- cystoscopies with bladder biopsies  which were negative for bladder tumor  - 1/17/18, 3/8/18, 7/26/18, 9/10/18- Multiple urine cytologies have come back positive by FISH for high-grade transitional cell carcinoma  - 9/10/18- Underwent a bilateral cystoureteroscopy with biopsy and brushing that showed  right upper tract cytology suspicious for high-grade urothelial ca. Right ureter brushing negative from that day. Left upper tract negative.  - 9/10/18- CT A/P without contrast showed new small bilateral pleural effusions and interstitial pulmonary edema but no evidence of locoregional or metastatic disease.  - 9/12/18- Presented to ED with oliguria, CRESENCIO, and mild hydronephrosis bilaterally on CT scan and underwent bilateral ureteral stent placment  - 11/13/2018- Right robotic nephroureterectomy, excision of sandip-caval mass and LN excision by Stuart King. Pathology showed \"Right nephroureterectomy: Invasive high grade urothelial carcinoma measuring 3.5 cm, " "located in renal pelvis and invading through renal parenchyma into perinephric fat. Urothelial carcinoma in-situ present. Margins free of tumor. Ana-caval mass: Metastatic urothelial carcinoma involving one lymph node with at least 1 cm tumor deposit. Pericaval Extranodal Extension present. Five additional benign pericaval lymph nodes. Pathologic stage: pT4N1 (1 of 6 lymph nodes).\"  - 12/11/18- PET/CT whole body demonstrated significant residual FDG avid disease. For example, \"there is an FDG avid ill-defined pericaval mass immediately medial to the surgical clips measuring approximately 2.0 x 1.4 cm, with max SUV measuring up to12.2, see series 4 image 21. Additional FDG avid retrocaval and interaortocaval lymphadenopathy are noted.There is a 1.2 cm nodule in the right hemipelvis posterior lateral tothe bladder with max SUV measuring 8.3, see series 4 image 77. The bladder is incompletely distended.\" No suspicious thoracic or bone uptake.  - 12/12/18- Started adjuvant chemotherapy (carbo+gem) per POUT trial. Cycle 2 - 1/2/19. Cycle 3 - 1/23/19. Cycle 4 - 02/13/19.  - 1/22/19 - CT C/A/P with contrast compared with prior PET/CT: \"1. New well-circumscribed soft tissue pulmonary nodules of the right lower lobe and left upper lobe. Findings could be infectious, inflammatory, or represent metastatic disease. Continued attention on follow-up recommended. Postoperative changes of right nephrectomy. No evidence for local recurrence in the present study.\"  - 3/1/2019- CT C/A/P with contrast - \"1. Bilateral pulmonary nodules measuring up to 4 mm in the right lower lobe, previously measuring 8 mm. No new or enlarging pulmonary nodules. 2. No convincing evidence for metastatic disease in the abdomen, pelvis and bones.\"  - 6/3/2019- CT C/A/P with contrast - \"1. Surgical changes of right nephrectomy without evidence of residual or recurrent disease on this noncontrast exam.  Consider contrast enhanced examination on follow-up. " "2. No evidence of metastatic disease in the abdomen, or pelvis on noncontrast CT.  Scattered tiny pulmonary nodules in the chest are not significantly changed.  Previously described prominent right lower lobe pulmonary nodule (previously measuring up to 8 mm) is no longer visualized. 3. Stable bilateral pulmonary nodules measuring maximally 4 mm.\"  - 09/11/19: CT C/A/P without contrast - \"1. Stable exam without evidence convincing for new disease. 2. Stable pulmonary nodules. 3. 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 4. Stable heterogeneous enlargement of the thyroid with underlying nodules suggested.\"  - 12/4/19: CT C/A/P without contrast - \"No acute change since prior exam. No evidence of recurrent or metastatic disease. Tiny lung nodules are stable. Prior right nephrectomy. Left renal artery aneurysm is stable.\"  - 04/08/20, 7/27/20: CT C/A/P with contrast - GABRIELLE.  - 01/12/21: CT C/A/P with contrast - \"1.  Slight increased size of a 6 mm left upper lobe nodule and new 4 mm linear opacity along the right major fissure. These are indeterminate but could represent progression of metastatic disease. 2.  Slight increased size of mildly enlarged mediastinal and hilar lymph nodes. 3.  Mild pulmonary edema. 4.  No recurrent or metastatic disease in the abdomen and pelvis. 5.  Mild stranding around the urinary bladder dome may be related to cystitis. Punctate focus of gas within the urinary bladder either secondary to infection or instrumentation. 6.  Enlarged multinodular thyroid gland.\"    2. Non-muscle invasive bladder cancer:  - 10/3/19: Cystoscopy showed a small area of erythema on retroflexion, possibly pre-existing or due to retroflexion of the scope. Urine cytology from that time showed cells suspicious for malignancy.   - 1/13/20: Bladder biopsy showed high grade urothelial carcinoma in-situ  - 2/12/20-3/18/20: Intravesical BCG therapy  - 7/24/20 and last cystoscopy was on the same day. It was " negative. However, urine cytology was positive for high-grade urothelial carcinoma.   - 11/25/20-12/10/2020: 3 weekly doses of intravesical BCG 50mg.   - 12/10/20: Cytology suspicious and FISH urovysion positive for TCC.    REVIEW OF SYSTEMS: 14 point ROS negative other than the symptoms noted above in the HPI.    PAST MEDICAL AND SURGICAL HISTORY:   Past Medical History:   Diagnosis Date     Arthritis      Calculus of kidney      Chemotherapy-induced neutropenia (H) 3/6/2019     Congestive heart failure (H)      Esophageal reflux      GERD (gastroesophageal reflux disease)      Hyperlipidemia LDL goal <130 5/9/2010     Malignant melanoma of skin of trunk, except scrotum (H)      Nonspecific abnormal finding     has living will 2004 -      Nontoxic multinodular goiter     no further eval /tx rec per pt     Osteopenia      Other psoriasis      Personal history of colonic polyps      PMR (polymyalgia rheumatica) (H)      Restless legs syndrome 7/11/2018     Stress-induced cardiomyopathy      Undiagnosed cardiac murmurs      Unspecified constipation      Unspecified essential hypertension      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     Past Surgical History:   Procedure Laterality Date     ARTHROPLASTY KNEE Right 11/9/2020    Procedure: ARTHROPLASTY, KNEE, TOTAL, RIGHT;  Surgeon: Edward Otero MD;  [facility]     BIOPSY       COLONOSCOPY  2014     COMBINED CYSTOSCOPY, INSERT STENT URETER(S) Bilateral 9/12/2018    Procedure: COMBINED CYSTOSCOPY, INSERT STENT URETER(S);  Cystoscopy Bilateral ureteral Stent Placement.;  Surgeon: Justin Henry MD;  [facility]     COMBINED CYSTOSCOPY, RETROGRADES, URETEROSCOPY, INSERT STENT Bilateral 4/3/2018    Procedure: COMBINED CYSTOSCOPY, RETROGRADES, URETEROSCOPY, INSERT STENT;;  Surgeon: Stuart King MD;  [facility]     COMBINED CYSTOSCOPY, RETROGRADES, URETEROSCOPY, INSERT STENT Bilateral 9/10/2018    Procedure: COMBINED CYSTOSCOPY,  RETROGRADES, URETEROSCOPY, INSERT STENT;  Cystoscopy, Bilateral Ureteroscopy, Bladder Biopsies, Retrogram Pyelograms, Ureteral Washings and brushings, cysview;  Surgeon: Stuart King MD;  [facility]     COMBINED CYSTOSCOPY, RETROGRADES, URETEROSCOPY, INSERT STENT Left 8/26/2020    Procedure: Cystoscopy, Left Ureteral Wash, Left ureteral Retrograde Pyelogram;  Surgeon: Justin Henry MD;  [facility]     CYSTOSCOPY, BIOPSY BLADDER INSTILL OPTICAL AGENT N/A 4/3/2018    Procedure: CYSTOSCOPY, BIOPSY BLADDER INSTILL OPTICAL AGENT;  Cystoscopy, Blue Light Cystoscopy, Bladder Biopsies, Bilateral Selective ureteral washings for Cytology, Bilateral Retrograde Pyelograms, Bilateral Ureteroscopy;  Surgeon: Stuart King MD;  [facility]     CYSTOSCOPY, BIOPSY BLADDER, COMBINED N/A 2/19/2018    Procedure: COMBINED CYSTOSCOPY, BIOPSY BLADDER;  Cystoscopy, Bladder Biopsy;  Surgeon: Kenna La MD;  [facility]     CYSTOSCOPY, BIOPSY BLADDER, COMBINED N/A 8/26/2020    Procedure: Cystoscopy, biopsy bladder, combined;  Surgeon: Justin Henry MD;  [facility]     CYSTOSCOPY, FULGURATE BLADDER TUMOR, COMBINED N/A 1/13/2020    Procedure: CYSTOSCOPY, WITH  bladder biopsies and fulguration;  Surgeon: Stuart King MD;  [facility]     CYSTOSCOPY, REMOVE STENT(S), COMBINED  11/13/2018    Procedure: Flexible Cystoscopy with Stent Removal;  Surgeon: Stuart King MD;  [facility]     DAVINCI LYMPHADENECTOMY N/A 11/13/2018    Procedure: Davinci Lymphadenectomy ;  Surgeon: Stuart King MD;  [facility]     DAVINCI NEPHROURETERECTOMY N/A 11/13/2018    Procedure: Right DaVinci Assisted Nephroureterectomy;  Surgeon: Stuart King MD;  [facility]     ENDOSCOPIC ULTRASOUND LOWER GASTROINTESTIONAL TRACT (GI) N/A 10/30/2015    Procedure: ENDOSCOPIC ULTRASOUND LOWER GASTROINTESTIONAL TRACT (GI);  Surgeon: Jerilyn  Daniel Pradhan MD;  [facility]     EYE SURGERY  12/4/17     INSERT PORT VASCULAR ACCESS Right 12/19/2018    Procedure: Chest Port Placement - right;  Surgeon: Stuart Chavez PA-C;  [facility]     IR CHEST PORT PLACEMENT > 5 YRS OF AGE  12/19/2018     IR PORT REMOVAL RIGHT  6/26/2019     LAPAROSCOPIC CHOLECYSTECTOMY WITH CHOLANGIOGRAMS N/A 11/1/2015    Procedure: LAPAROSCOPIC CHOLECYSTECTOMY WITH CHOLANGIOGRAMS;  Surgeon: Tonie Warren MD;  [facility]     REMOVE PORT VASCULAR ACCESS Right 6/26/2019    Procedure: Right Port Removal;  Surgeon: Froilan Irizarry PA-C;  [facility]     SURGICAL HISTORY OF -   1996    malignant melanoma     SURGICAL HISTORY OF -   1968    thyroid nodule     SURGICAL HISTORY OF -       D & C     ZZC NONSPECIFIC PROCEDURE  2005    colonoscopy polyp repeat 2010     SOCIAL HISTORY:   Social History     Tobacco Use     Smoking status: Never Smoker     Smokeless tobacco: Never Used   Vaping Use     Vaping Use: Never used   Substance Use Topics     Alcohol use: No     Alcohol/week: 0.0 standard drinks     Comment: rare     Drug use: No     FAMILY HISTORY:   Family History   Problem Relation Age of Onset     Cancer Father         dec - esophageal and laryngeal     Heart Disease Mother      Respiratory Mother         dec     Breast Cancer Daughter      Other Cancer Daughter      Thyroid Disease Daughter      Asthma Daughter      Hyperlipidemia Son      Diabetes Son      Anesthesia Reaction No family hx of      Deep Vein Thrombosis (DVT) No family hx of      ALLERGIES:   Allergies   Allergen Reactions     Codeine      CURRENT MEDICATIONS:   Current Outpatient Medications:      alendronate (FOSAMAX) 70 MG tablet, TAKE 1 TABLET EVERY 7 DAYS AT LEAST 60 MINUTES BEFORE BREAKFAST AS DIRECTED., Disp: 12 tablet, Rfl: 3     amoxicillin (AMOXIL) 500 MG tablet, Take 4 tablets (2000 mg) by mouth 1 hour before dental procedures/cleanings., Disp: 4 tablet, Rfl: 3      bisacodyl (DULCOLAX) 5 MG EC tablet, Take 2 tablets by mouth at bedtime two days prior to procedure.  Take 2 tabs by mouth at 3pm one day prior to procedure., Disp: 4 tablet, Rfl: 0     diltiazem ER (DILT-XR) 180 MG 24 hr capsule, Take 1 capsule (180 mg) by mouth daily, Disp: 90 capsule, Rfl: 0     ferrous sulfate 325 (65 Fe) MG TBEC EC tablet, Take 325 mg by mouth every morning , Disp: , Rfl:      furosemide (LASIX) 20 MG tablet, Take 1 tablet (20 mg) by mouth 2 times daily .  Take in the morning and after lunch., Disp: 90 tablet, Rfl: 1     irbesartan (AVAPRO) 300 MG tablet, TAKE 1 TABLET EVERY DAY, Disp: 90 tablet, Rfl: 2     lovastatin (MEVACOR) 40 MG tablet, TAKE 1 TABLET AT BEDTIME, Disp: 90 tablet, Rfl: 0     methimazole (TAPAZOLE) 5 MG tablet, Take 5 mg alternating with 2.5 mg every other day, Disp: 90 tablet, Rfl: 3     Omega-3 Fatty Acids (FISH OIL) 500 MG CAPS, Take 1 capsule by mouth every morning , Disp: , Rfl:      omeprazole (PRILOSEC) 20 MG DR capsule, TAKE 1 CAPSULE EVERY DAY, Disp: 90 capsule, Rfl: 1     ondansetron (ZOFRAN-ODT) 4 MG ODT tab, Take 1 tablet (4 mg) by mouth every 6 hours as needed for nausea or vomiting, Disp: 20 tablet, Rfl: 0     propranolol ER (INDERAL LA) 60 MG 24 hr capsule, Take 1 capsule (60 mg) by mouth daily, Disp: 90 capsule, Rfl: 2     rivaroxaban ANTICOAGULANT (XARELTO) 15 MG TABS tablet, Take 1 tablet (15 mg) by mouth daily (with dinner), Disp: 90 tablet, Rfl: 0     rOPINIRole (REQUIP) 0.25 MG tablet, TAKE 1 TABLET IN THE AFTERNOON AND TAKE 2 TABLETS EVERY NIGHT, Disp: 270 tablet, Rfl: 3     sertraline (ZOLOFT) 100 MG tablet, TAKE 1 TABLET EVERY MORNING, Disp: 90 tablet, Rfl: 3     traZODone (DESYREL) 50 MG tablet, TAKE 1/2 TABLET AT BEDTIME, Disp: 45 tablet, Rfl: 3     diltiazem ER COATED BEADS (CARDIZEM CD/CARTIA XT) 180 MG 24 hr capsule, , Disp: , Rfl:     Current Facility-Administered Medications:      lidocaine (XYLOCAINE) 2 % external gel, , Urethral, Once,  Carl, Justin Mcgovern MD    PHYSICAL EXAMINATION:  Deferred. Phone visit due to COVID.    LABORATORY DATA:   Recent Labs   Lab Test 11/19/21  0921 11/19/21  0911 09/02/21  0644 09/01/21  1505 08/11/21  1059 08/11/21  1031 05/11/21  1007   NA  --  141 139 136  --  134 138   POTASSIUM  --  4.4 4.0 3.5  --  4.0 4.2   CHLORIDE  --  105 109 102  --  102 101   CO2  --  28 25 26  --  25 29   ANIONGAP  --  8 5 8  --  7 8   BUN  --  22 14 14  --  20 24   CR 1.2* 1.08* 0.97 1.00 1.0 0.92 1.04   GLC  --  130* 107* 167*  --  111* 161*   SHREYA  --  9.2 8.8 9.2  --  9.2 9.3     Recent Labs   Lab Test 09/02/21  0644 09/01/21  1505 02/03/19  2102 12/12/18  1050 01/16/18  1247 01/16/18  0646 12/13/17  2236 04/18/16  0905 11/02/15  0912 11/01/15  0727 10/31/15  0810   MAG 2.4* 2.3 1.8 2.1 2.1   < > 2.0  --   --  2.0  --    PHOS  --   --   --   --   --   --  2.4* 3.2 3.2 2.1* 2.4*    < > = values in this interval not displayed.     Recent Labs   Lab Test 11/19/21  0911 09/01/21  1505 08/11/21  1031 05/11/21  1007 01/12/21  1230   WBC 7.5 6.3 9.5 7.1 6.2   HGB 11.4* 12.4 11.4* 12.6 11.7    260 213 220 171   MCV 95 96 98 98 100   NEUTROPHIL 72 66 89 72.6 67.6     Recent Labs   Lab Test 11/19/21  0911 08/11/21  1031 05/11/21  1007 09/11/18  0612 07/17/18  1346   BILITOTAL 0.5 0.4 0.5   < >  --    ALKPHOS 98 88 94   < >  --    ALT 23 17 18   < >  --    AST 15 14 10   < >  --    ALBUMIN 3.4 3.5 3.6   < >  --     176  --   --  204    < > = values in this interval not displayed.     TSH   Date Value Ref Range Status   09/02/2021 1.24 0.40 - 4.00 mU/L Final   07/05/2021 1.68 0.40 - 4.00 mU/L Final   05/27/2021 1.93 0.40 - 4.00 mU/L Final   01/27/2021 2.42 0.40 - 4.00 mU/L Final     No results for input(s): CEA in the last 48895 hours.  Results for orders placed or performed in visit on 11/19/21   CT Chest/Abdomen/Pelvis w Contrast    Narrative    EXAM: CT CHEST/ABDOMEN/PELVIS W CONTRAST  LOCATION: [facility]  [facility]  DATE/TIME: 11/19/2021 9:08 AM    INDICATION: surveillance bladder cancer  COMPARISON: Multiple with the most recent CT chest, abdomen and pelvis 08/11/2021.  TECHNIQUE: CT scan of the chest, abdomen, and pelvis was performed following injection of IV contrast. Multiplanar reformats were obtained. Dose reduction techniques were used.   CONTRAST: Isovue 370 105cc    FINDINGS:   LUNGS AND PLEURA: Stable few pulmonary nodules with largest in the left upper lobe measures 5 mm (series 6 image 84). Mild interlobular septal thickening. No focal consolidation.    MEDIASTINUM/AXILLAE: Unchanged enlarged multinodular thyroid. No lymphadenopathy. Normal caliber aorta. Normal heart size. Moderate hiatal hernia.    CORONARY ARTERY CALCIFICATION: Mild.    HEPATOBILIARY: No focal mass. Cholecystectomy. Stable extrahepatic biliary dilatation likely related to age and postcholecystectomy state.    PANCREAS: Normal.    SPLEEN: Normal.    ADRENAL GLANDS: Normal.    KIDNEYS/BLADDER: Right nephrectomy. Stable 4 mm nonobstructing left intrarenal calculus. No suspicious left renal mass, filling disc the left renal collecting system or ureter. Urinary bladder is only partially distended without gross abnormality.    BOWEL: Normal.    LYMPH NODES: Stable borderline enlarged 11 mm lesion right common iliac lymph node (series 3 image 407).    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    PELVIC ORGANS: Normal.    MUSCULOSKELETAL: Normal.      Impression    IMPRESSION:  1.  Stable exam without evidence of new or progressive malignancy.  2.  Stable bilateral pulmonary nodules measuring up to 5 mm.  3.  Right nephrectomy.  4.  Nonobstructing 4 mm left intrarenal calculus.  5.  Stable borderline enlarged right common lymph node.  6.  Unchanged left renal artery aneurysm.  7.  Unchanged multinodular thyroid.     *Note: Due to a large number of results and/or encounters for the requested time period, some  results have not been displayed. A complete set of results can be found in Results Review.         ASSESSMENT AND PLAN: Ms. 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    1. Upper tract urothelial cancer:   - She completed 4 cycles of adjuvant carboplatin plus gemcitabine chemotherapy per POUT trial.    She completed chemotherapy in Feb 2019 nearly 3 yrs ago  - No residual side effects or evidence of recurrence.  - Reviewed restaging CT scan from Jan, March, May and now Aug 2021; there is no clear evidence of disease recurrence in abd/pelvis.   She continues to have pulmonary nodules which remain stable.    Lung findings are non-specific and these are also not responsible for her shortness of breath    - We will continue to monitor and her get a repeat CT C/A/P without contrast in 6 months.     2. High grade urothelial carcinoma in situ:  - Bx proven carcinoma in situ in 1/2020, completed the first round of intravesical BCG treatment 2/2020-3/2020 and second in 11/2020-12/2020.  - She was initially followed by Dr. King and now is under care of Dr. Froilan Henry.   - She did not have urine cytology done and I will get it after this visit.   - She will continue on 3 weekly BCG every 3 months as part of her maintenance along with follow up cystoscopies    3. Chemo induced anemia and thrombocytopenia:  - Resolved.      4. Persistent HERMAN:  - She follows Dr. Griffith in Cardiology for her h/o moderate AORTIC STENOSIS, CHF, and Afib now with worsening SOA and lymphedema with no cancer-related etiology evident.     Return to clinic in 6 months with restaging scans.    All of the above was explained to the patient in lay language and several questions were answered. They are in agreement with the plan.     25 minutes  spent on the date of the encounter doing chart review, history and exam, documentation and further activities as noted above     Sd Fine    Hematologist and Medical Oncologist  [facility]

## 2021-12-01 NOTE — NURSING NOTE
"Oncology Rooming Note    December 1, 2021 11:57 AM   Dimple Oviedo is a 88 year old female who presents for:    Chief Complaint   Patient presents with     Oncology Clinic Visit     Urothelial cancer (H)     Initial Vitals: /72 (BP Location: Right arm, Patient Position: Sitting, Cuff Size: Adult Regular)   Pulse 75   Temp 98  F (36.7  C) (Oral)   Resp 16   Wt 76.8 kg (169 lb 6.4 oz)   SpO2 96%   BMI 36.66 kg/m   Estimated body mass index is 36.66 kg/m  as calculated from the following:    Height as of 11/29/21: 1.448 m (4' 9\").    Weight as of this encounter: 76.8 kg (169 lb 6.4 oz). Body surface area is 1.76 meters squared.  Mild Pain (3) Comment: Data Unavailable   No LMP recorded. Patient is postmenopausal.  Allergies reviewed: Yes  Medications reviewed: Yes    Medications: Medication refills not needed today.  Pharmacy name entered into [employer]:    [facility] - [address] [id number] - [place], [place] - [address] AT [facility] [address] [place] [address] [facility] STORE #[id number] - [place], [place] - [address] AT [facility] [address]  [facility] - [place], [place] - [address]    Clinical concerns: Patient was seen yesterday for blood in her stool.        Charmaine Arauz LPN December 1, 2021 11:58 AM                "

## 2021-12-09 ENCOUNTER — HOSPITAL ENCOUNTER (EMERGENCY)
Facility: CLINIC | Age: 86
Discharge: HOME | End: 2021-12-10
Attending: EMERGENCY MEDICINE | Admitting: EMERGENCY MEDICINE
Payer: MEDICARE

## 2021-12-09 DIAGNOSIS — R04.0 EPISTAXIS: ICD-10-CM

## 2021-12-09 PROCEDURE — 82040 ASSAY OF SERUM ALBUMIN: CPT | Performed by: EMERGENCY MEDICINE

## 2021-12-09 PROCEDURE — 85610 PROTHROMBIN TIME: CPT | Performed by: EMERGENCY MEDICINE

## 2021-12-09 PROCEDURE — 85730 THROMBOPLASTIN TIME PARTIAL: CPT | Performed by: EMERGENCY MEDICINE

## 2021-12-09 PROCEDURE — 99284 EMERGENCY DEPT VISIT MOD MDM: CPT | Mod: 25

## 2021-12-09 PROCEDURE — 30903 CONTROL OF NOSEBLEED: CPT | Performed by: EMERGENCY MEDICINE

## 2021-12-09 PROCEDURE — 30903 CONTROL OF NOSEBLEED: CPT

## 2021-12-09 PROCEDURE — 99284 EMERGENCY DEPT VISIT MOD MDM: CPT | Mod: 25 | Performed by: EMERGENCY MEDICINE

## 2021-12-09 PROCEDURE — 36415 COLL VENOUS BLD VENIPUNCTURE: CPT | Performed by: EMERGENCY MEDICINE

## 2021-12-09 PROCEDURE — 85041 AUTOMATED RBC COUNT: CPT | Performed by: EMERGENCY MEDICINE

## 2021-12-09 ASSESSMENT — ENCOUNTER SYMPTOMS
CONFUSION: 0
DYSURIA: 0
COUGH: 0
CHILLS: 0
NECK PAIN: 0
BACK PAIN: 0
DIFFICULTY URINATING: 0
COLOR CHANGE: 0
HEADACHES: 0
DIARRHEA: 0
FREQUENCY: 0
WEAKNESS: 0
VOMITING: 1
MYALGIAS: 0
NAUSEA: 0
SHORTNESS OF BREATH: 0
FATIGUE: 0
DIZZINESS: 0
CONSTIPATION: 0
ABDOMINAL DISTENTION: 0
CHEST TIGHTNESS: 0
FEVER: 0
SORE THROAT: 0
PALPITATIONS: 0
EYE PAIN: 0
ABDOMINAL PAIN: 0
ARTHRALGIAS: 0

## 2021-12-10 ENCOUNTER — PATIENT OUTREACH (OUTPATIENT)
Dept: FAMILY MEDICINE | Facility: CLINIC | Age: 86
End: 2021-12-10
Payer: MEDICARE

## 2021-12-10 VITALS
TEMPERATURE: 97.8 F | SYSTOLIC BLOOD PRESSURE: 145 MMHG | OXYGEN SATURATION: 94 % | RESPIRATION RATE: 22 BRPM | DIASTOLIC BLOOD PRESSURE: 108 MMHG | HEART RATE: 111 BPM

## 2021-12-10 LAB
ALBUMIN SERPL-MCNC: 3.4 G/DL (ref 3.4–5)
ALP SERPL-CCNC: 105 U/L (ref 40–150)
ALT SERPL W P-5'-P-CCNC: 20 U/L (ref 0–50)
ANION GAP SERPL CALCULATED.3IONS-SCNC: 6 MMOL/L (ref 3–14)
APTT PPP: 42 SECONDS (ref 22–38)
AST SERPL W P-5'-P-CCNC: 20 U/L (ref 0–45)
BASOPHILS # BLD AUTO: 0.1 10E3/UL (ref 0–0.2)
BASOPHILS NFR BLD AUTO: 1 %
BILIRUB SERPL-MCNC: 0.4 MG/DL (ref 0.2–1.3)
BUN SERPL-MCNC: 26 MG/DL (ref 7–30)
CALCIUM SERPL-MCNC: 9.1 MG/DL (ref 8.5–10.1)
CHLORIDE BLD-SCNC: 103 MMOL/L (ref 94–109)
CO2 SERPL-SCNC: 29 MMOL/L (ref 20–32)
CREAT SERPL-MCNC: 0.98 MG/DL (ref 0.52–1.04)
EOSINOPHIL # BLD AUTO: 0.1 10E3/UL (ref 0–0.7)
EOSINOPHIL NFR BLD AUTO: 2 %
ERYTHROCYTE [DISTWIDTH] IN BLOOD BY AUTOMATED COUNT: 14 % (ref 10–15)
GFR SERPL CREATININE-BSD FRML MDRD: 52 ML/MIN/1.73M2
GLUCOSE BLD-MCNC: 103 MG/DL (ref 70–99)
HCT VFR BLD AUTO: 35.3 % (ref 35–47)
HGB BLD-MCNC: 11.1 G/DL (ref 11.7–15.7)
HOLD SPECIMEN: NORMAL
IMM GRANULOCYTES # BLD: 0 10E3/UL
IMM GRANULOCYTES NFR BLD: 1 %
INR PPP: 2.04 (ref 0.85–1.15)
LYMPHOCYTES # BLD AUTO: 1.8 10E3/UL (ref 0.8–5.3)
LYMPHOCYTES NFR BLD AUTO: 27 %
MCH RBC QN AUTO: 29.5 PG (ref 26.5–33)
MCHC RBC AUTO-ENTMCNC: 31.4 G/DL (ref 31.5–36.5)
MCV RBC AUTO: 94 FL (ref 78–100)
MONOCYTES # BLD AUTO: 0.8 10E3/UL (ref 0–1.3)
MONOCYTES NFR BLD AUTO: 11 %
NEUTROPHILS # BLD AUTO: 4 10E3/UL (ref 1.6–8.3)
NEUTROPHILS NFR BLD AUTO: 58 %
NRBC # BLD AUTO: 0 10E3/UL
NRBC BLD AUTO-RTO: 0 /100
PLATELET # BLD AUTO: 266 10E3/UL (ref 150–450)
POTASSIUM BLD-SCNC: 4.1 MMOL/L (ref 3.4–5.3)
PROT SERPL-MCNC: 7.6 G/DL (ref 6.8–8.8)
RBC # BLD AUTO: 3.76 10E6/UL (ref 3.8–5.2)
SODIUM SERPL-SCNC: 138 MMOL/L (ref 133–144)
WBC # BLD AUTO: 6.8 10E3/UL (ref 4–11)

## 2021-12-10 PROCEDURE — 99207 PR NO CHARGE LOS: CPT | Performed by: OTOLARYNGOLOGY

## 2021-12-10 RX ORDER — OXYMETAZOLINE HYDROCHLORIDE 0.05 G/100ML
2 SPRAY NASAL 2 TIMES DAILY
Status: DISCONTINUED | OUTPATIENT
Start: 2021-12-10 | End: 2021-12-10

## 2021-12-10 RX ORDER — OXYMETAZOLINE HYDROCHLORIDE 0.05 G/100ML
2 SPRAY NASAL 2 TIMES DAILY
Status: DISCONTINUED | OUTPATIENT
Start: 2021-12-10 | End: 2021-12-10 | Stop reason: HOSPADM

## 2021-12-10 NOTE — ED TRIAGE NOTES
Pt. Arrives to ED complaining of epitaxis that began at 10 pm . Pt started Xarleto six months ago . Pt is a/o x 3.Pt. is breathing at respirations of 18 . Pt has bilateral non pitting  leg edema .

## 2021-12-10 NOTE — TELEPHONE ENCOUNTER
"ED/Discharge Protocol    \"Hi, my name is Araceli Shelton RN, a registered nurse, and I am calling on behalf of Dr. Zapien's office at [place].  I am calling to follow up and see how things are going for you after your recent visit.\"    \"I see that you were in the (ER/UC/IP) on 12/9/21.    How are you doing now that you are home?\" \"Im doing okay, the block in my nose is helping and starting to disintegrate\"    Patient also stated she had a BM this morning that had some blood in it. RN advised if this continues, please call us back and there are nurses available 24/7 to take your call. Patient understood and agreed with plan.     Is patient experiencing symptoms that may require a hospital visit?  No    Discharge Instructions    \"Let's review your discharge instructions.  What is/are the follow-up recommendations?  Pt. Response: Follow up with ENT    \"Were you instructed to make a follow-up appointment?\"  Pt. Response: Yes.  Has appointment been made?   No.  \"Can I help you schedule that appointment?\" ENT scheduling supposed to call patient      \"When you see the provider, I would recommend that you bring your discharge instructions with you.    Medications    \"How many new medications are you on since your hospitalization/ED visit?\"    0-1  \"How many of your current medicines changed (dose, timing, name, etc.) while you were in the hospital/ED visit?\"   0-1  \"Do you have questions about your medications?\"   No  \"Were you newly diagnosed with heart failure, COPD, diabetes or did you have a heart attack?\"   No  For patients on insulin: \"Did you start on insulin in the hospital or did you have your insulin dose changed?\"   No  Post Discharge Medication Reconciliation Status: discharge medications reconciled, continue medications without change.    Was MTM referral placed (*Make sure to put transitions as reason for referral)?   No    Call Summary    \"Do you have any questions or concerns about your condition or " "care plan at the moment?\"    No  Triage nurse advice given: Continue the gauze     Patient was in ER twice in the past year (assess appropriateness of ER visits.)      \"If you have questions or things don't continue to improve, we encourage you contact us through the main clinic number,  [phone].  Even if the clinic is not open, triage nurses are available 24/7 to help you.     We would like you to know that our clinic has extended hours (provide information).  We also have urgent care (provide details on closest location and hours/contact info)\"      \"Thank you for your time and take care!\"      MERE Solis RN  [facility]    "

## 2021-12-10 NOTE — ED PROVIDER NOTES
ED Provider Note  [facility]      History     Chief Complaint   Patient presents with     Epistaxis     The history is provided by the patient and medical records.     Dimple Oviedo is a 88 year old female with a past medical history significant for CHF on Xarelto, hyperlipidemia, hypertension, urothelial carcinoma who presents to the ED via EMS for an evaluation of epistaxis.  Patient reports an onset of epistaxis at 10 PM this evening.  Per triage patient was experiencing hematemesis en route but the this has increased since arrival to the ED.  Patient is passing blood clots through her nose and mouth.  Patient reports that she has had nosebleeds in the past but she can always stop them right away.    Past Medical History  Past Medical History:   Diagnosis Date     Arthritis      Calculus of kidney      Chemotherapy-induced neutropenia (H) 3/6/2019     Congestive heart failure (H)      Esophageal reflux      GERD (gastroesophageal reflux disease)      Hyperlipidemia LDL goal <130 5/9/2010     Malignant melanoma of skin of trunk, except scrotum (H)      Nonspecific abnormal finding     has living will 2004 -      Nontoxic multinodular goiter     no further eval /tx rec per pt     Osteopenia      Other psoriasis      Personal history of colonic polyps      PMR (polymyalgia rheumatica) (H)      Restless legs syndrome 7/11/2018     Stress-induced cardiomyopathy      Undiagnosed cardiac murmurs      Unspecified constipation      Unspecified essential hypertension      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     Past Surgical History:   Procedure Laterality Date     ARTHROPLASTY KNEE Right 11/9/2020    Procedure: ARTHROPLASTY, KNEE, TOTAL, RIGHT;  Surgeon: Edward Otero MD;  [facility]     BIOPSY       COLONOSCOPY  2014     COMBINED CYSTOSCOPY, INSERT STENT URETER(S) Bilateral 9/12/2018    Procedure: COMBINED CYSTOSCOPY, INSERT STENT URETER(S);  Cystoscopy Bilateral ureteral Stent  Placement.;  Surgeon: Justin Henry MD;  [facility]     COMBINED CYSTOSCOPY, RETROGRADES, URETEROSCOPY, INSERT STENT Bilateral 4/3/2018    Procedure: COMBINED CYSTOSCOPY, RETROGRADES, URETEROSCOPY, INSERT STENT;;  Surgeon: Stuart King MD;  [facility]     COMBINED CYSTOSCOPY, RETROGRADES, URETEROSCOPY, INSERT STENT Bilateral 9/10/2018    Procedure: COMBINED CYSTOSCOPY, RETROGRADES, URETEROSCOPY, INSERT STENT;  Cystoscopy, Bilateral Ureteroscopy, Bladder Biopsies, Retrogram Pyelograms, Ureteral Washings and brushings, cysview;  Surgeon: Stuart King MD;  [facility]     COMBINED CYSTOSCOPY, RETROGRADES, URETEROSCOPY, INSERT STENT Left 8/26/2020    Procedure: Cystoscopy, Left Ureteral Wash, Left ureteral Retrograde Pyelogram;  Surgeon: Justin Henry MD;  [facility]     CYSTOSCOPY, BIOPSY BLADDER INSTILL OPTICAL AGENT N/A 4/3/2018    Procedure: CYSTOSCOPY, BIOPSY BLADDER INSTILL OPTICAL AGENT;  Cystoscopy, Blue Light Cystoscopy, Bladder Biopsies, Bilateral Selective ureteral washings for Cytology, Bilateral Retrograde Pyelograms, Bilateral Ureteroscopy;  Surgeon: Stuart King MD;  [facility]     CYSTOSCOPY, BIOPSY BLADDER, COMBINED N/A 2/19/2018    Procedure: COMBINED CYSTOSCOPY, BIOPSY BLADDER;  Cystoscopy, Bladder Biopsy;  Surgeon: Kenna La MD;  [facility]     CYSTOSCOPY, BIOPSY BLADDER, COMBINED N/A 8/26/2020    Procedure: Cystoscopy, biopsy bladder, combined;  Surgeon: Justin Henry MD;  [facility]     CYSTOSCOPY, FULGURATE BLADDER TUMOR, COMBINED N/A 1/13/2020    Procedure: CYSTOSCOPY, WITH  bladder biopsies and fulguration;  Surgeon: Stuart King MD;  [facility]     CYSTOSCOPY, REMOVE STENT(S), COMBINED  11/13/2018    Procedure: Flexible Cystoscopy with Stent Removal;  Surgeon: Stuart King MD;  [facility]     DAVINCI LYMPHADENECTOMY N/A 11/13/2018    Procedure:  Davinci Lymphadenectomy ;  Surgeon: Stuart King MD;  [facility]     DAVINCI NEPHROURETERECTOMY N/A 11/13/2018    Procedure: Right DaVinci Assisted Nephroureterectomy;  Surgeon: Stuart King MD;  [facility]     ENDOSCOPIC ULTRASOUND LOWER GASTROINTESTIONAL TRACT (GI) N/A 10/30/2015    Procedure: ENDOSCOPIC ULTRASOUND LOWER GASTROINTESTIONAL TRACT (GI);  Surgeon: Daniel Jean-Baptiste MD;  [facility]     EYE SURGERY  12/4/17     INSERT PORT VASCULAR ACCESS Right 12/19/2018    Procedure: Chest Port Placement - right;  Surgeon: Stuart Chavez PA-C;  [facility]     IR CHEST PORT PLACEMENT > 5 YRS OF AGE  12/19/2018     IR PORT REMOVAL RIGHT  6/26/2019     LAPAROSCOPIC CHOLECYSTECTOMY WITH CHOLANGIOGRAMS N/A 11/1/2015    Procedure: LAPAROSCOPIC CHOLECYSTECTOMY WITH CHOLANGIOGRAMS;  Surgeon: Tonie Warren MD;  [facility]     REMOVE PORT VASCULAR ACCESS Right 6/26/2019    Procedure: Right Port Removal;  Surgeon: Froilan Irizarry PA-C;  [facility]     SURGICAL HISTORY OF -   1996    malignant melanoma     SURGICAL HISTORY OF -   1968    thyroid nodule     SURGICAL HISTORY OF -       D & C     ZZC NONSPECIFIC PROCEDURE  2005    colonoscopy polyp repeat 2010     alendronate (FOSAMAX) 70 MG tablet  amoxicillin (AMOXIL) 500 MG tablet  bisacodyl (DULCOLAX) 5 MG EC tablet  diltiazem ER (DILT-XR) 180 MG 24 hr capsule  diltiazem ER COATED BEADS (CARDIZEM CD/CARTIA XT) 180 MG 24 hr capsule  ferrous sulfate 325 (65 Fe) MG TBEC EC tablet  furosemide (LASIX) 20 MG tablet  irbesartan (AVAPRO) 300 MG tablet  lovastatin (MEVACOR) 40 MG tablet  methimazole (TAPAZOLE) 5 MG tablet  Omega-3 Fatty Acids (FISH OIL) 500 MG CAPS  omeprazole (PRILOSEC) 20 MG DR capsule  ondansetron (ZOFRAN-ODT) 4 MG ODT tab  propranolol ER (INDERAL LA) 60 MG 24 hr capsule  rivaroxaban ANTICOAGULANT (XARELTO) 15 MG TABS tablet  rOPINIRole (REQUIP) 0.25 MG tablet  sertraline (ZOLOFT)  100 MG tablet  traZODone (DESYREL) 50 MG tablet      Allergies   Allergen Reactions     Codeine      Family History  Family History   Problem Relation Age of Onset     Cancer Father         dec - esophageal and laryngeal     Heart Disease Mother      Respiratory Mother         dec     Breast Cancer Daughter      Other Cancer Daughter      Thyroid Disease Daughter      Asthma Daughter      Hyperlipidemia Son      Diabetes Son      Anesthesia Reaction No family hx of      Deep Vein Thrombosis (DVT) No family hx of      Social History   Social History     Tobacco Use     Smoking status: Never Smoker     Smokeless tobacco: Never Used   Vaping Use     Vaping Use: Never used   Substance Use Topics     Alcohol use: No     Alcohol/week: 0.0 standard drinks     Comment: rare     Drug use: No      Past medical history, past surgical history, medications, allergies, family history, and social history were reviewed with the patient. No additional pertinent items.       Review of Systems   Constitutional: Negative for chills, fatigue and fever.   HENT: Positive for nosebleeds. Negative for congestion and sore throat.    Eyes: Negative for pain and visual disturbance.   Respiratory: Negative for cough, chest tightness and shortness of breath.    Cardiovascular: Negative for chest pain and palpitations.   Gastrointestinal: Positive for vomiting. Negative for abdominal distention, abdominal pain, constipation, diarrhea and nausea.   Genitourinary: Negative for difficulty urinating, dysuria, frequency and urgency.   Musculoskeletal: Negative for arthralgias, back pain, myalgias and neck pain.   Skin: Negative for color change and rash.   Neurological: Negative for dizziness, weakness and headaches.   Psychiatric/Behavioral: Negative for confusion.     A complete review of systems was performed with pertinent positives and negatives noted in the HPI, and all other systems negative.    Physical Exam   BP: (!) 144/103  Pulse: 99  Temp:  97.8  F (36.6  C)  Resp: 22  SpO2: 97 %  Physical Exam  Vitals and nursing note reviewed.   Constitutional:       General: She is not in acute distress.     Appearance: Normal appearance.   HENT:      Head: Normocephalic.      Nose: Nose normal.      Comments: Active bleeding from left nare.  Unable to visualize.  Bloody postnasal drip.  Eyes:      Pupils: Pupils are equal, round, and reactive to light.   Cardiovascular:      Rate and Rhythm: Regular rhythm. Tachycardia present.   Pulmonary:      Effort: Pulmonary effort is normal.   Abdominal:      General: There is no distension.   Musculoskeletal:         General: No deformity. Normal range of motion.      Cervical back: Normal range of motion.   Skin:     General: Skin is warm.   Neurological:      Mental Status: She is alert and oriented to person, place, and time.   Psychiatric:         Mood and Affect: Mood normal.         ED Course      Procedures       The medical record was reviewed and interpreted.  Current labs reviewed and interpreted.  Previous labs reviewed and interpreted.  Managed outpatient prescription medications.              Results for orders placed or performed during the hospital encounter of 12/09/21   Comprehensive metabolic panel     Status: Abnormal   Result Value Ref Range    Sodium 138 133 - 144 mmol/L    Potassium 4.1 3.4 - 5.3 mmol/L    Chloride 103 94 - 109 mmol/L    Carbon Dioxide (CO2) 29 20 - 32 mmol/L    Anion Gap 6 3 - 14 mmol/L    Urea Nitrogen 26 7 - 30 mg/dL    Creatinine 0.98 0.52 - 1.04 mg/dL    Calcium 9.1 8.5 - 10.1 mg/dL    Glucose 103 (H) 70 - 99 mg/dL    Alkaline Phosphatase 105 40 - 150 U/L    AST 20 0 - 45 U/L    ALT 20 0 - 50 U/L    Protein Total 7.6 6.8 - 8.8 g/dL    Albumin 3.4 3.4 - 5.0 g/dL    Bilirubin Total 0.4 0.2 - 1.3 mg/dL    GFR Estimate 52 (L) >60 mL/min/1.73m2   CBC with platelets and differential     Status: Abnormal   Result Value Ref Range    WBC Count 6.8 4.0 - 11.0 10e3/uL    RBC Count 3.76 (L)  3.80 - 5.20 10e6/uL    Hemoglobin 11.1 (L) 11.7 - 15.7 g/dL    Hematocrit 35.3 35.0 - 47.0 %    MCV 94 78 - 100 fL    MCH 29.5 26.5 - 33.0 pg    MCHC 31.4 (L) 31.5 - 36.5 g/dL    RDW 14.0 10.0 - 15.0 %    Platelet Count 266 150 - 450 10e3/uL    % Neutrophils 58 %    % Lymphocytes 27 %    % Monocytes 11 %    % Eosinophils 2 %    % Basophils 1 %    % Immature Granulocytes 1 %    NRBCs per 100 WBC 0 <1 /100    Absolute Neutrophils 4.0 1.6 - 8.3 10e3/uL    Absolute Lymphocytes 1.8 0.8 - 5.3 10e3/uL    Absolute Monocytes 0.8 0.0 - 1.3 10e3/uL    Absolute Eosinophils 0.1 0.0 - 0.7 10e3/uL    Absolute Basophils 0.1 0.0 - 0.2 10e3/uL    Absolute Immature Granulocytes 0.0 <=0.4 10e3/uL    Absolute NRBCs 0.0 10e3/uL   Extra Red Top Tube     Status: None   Result Value Ref Range    Hold Specimen JIC    Extra Green Top (Lithium Heparin) Tube     Status: None   Result Value Ref Range    Hold Specimen JIC    Extra Purple Top Tube     Status: None   Result Value Ref Range    Hold Specimen JIC    Extra Blue Top Tube     Status: None   Result Value Ref Range    Hold Specimen JIC    INR     Status: Abnormal   Result Value Ref Range    INR 2.04 (H) 0.85 - 1.15   Partial thromboplastin time     Status: Abnormal   Result Value Ref Range    aPTT 42 (H) 22 - 38 Seconds   CBC with platelets differential     Status: Abnormal    Narrative    The following orders were created for panel order CBC with platelets differential.  Procedure                               Abnormality         Status                     ---------                               -----------         ------                     CBC with platelets and d...[[id number]]  Abnormal            Final result                 Please view results for these tests on the individual orders.   [place] Draw     Status: None    Narrative    The following orders were created for panel order [place] Draw.  Procedure                               Abnormality         Status                      ---------                               -----------         ------                     Extra Red Top Tube[[id number]]                               Final result               Extra Green Top (Lithium...[[id number]]                      Final result               Extra Purple Top Tube[[id number]]                            Final result                 Please view results for these tests on the individual orders.   [place] Draw     Status: None    Narrative    The following orders were created for panel order [place] Draw.  Procedure                               Abnormality         Status                     ---------                               -----------         ------                     Extra Blue Top Tube[[id number]]                              Final result                 Please view results for these tests on the individual orders.     Medications   phenylephrine (ALEXANDER-SYNEPHRINE) 0.25 % spray 1 spray (1 spray Nasal Not Given 12/10/21 0142)   oxymetazoline (AFRIN) 0.05 % spray 2 spray (has no administration in time range)        Assessments & Plan (with Medical Decision Making)   Patient presents to the ED for evaluation of epistaxis.  She has history of A. fib, on Xarelto.  On arrival, patient has considerable bleeding from the left nare.  Large amount of postnasal drip.  She is coughing/vomiting large clots of blood.  Overall, she feels dizzy and scared.    On arrival, patient is tachycardic.  Her blood pressure stable at 145/108.  Suction a large amount of blood out of her nares and oropharynx.  Applied Afrin and inserted rhino rocket.  Bleeding controlled temporarily.  ENT was consulted.  They evaluated patient in the ED.  Rhino Rocket was removed and absorbable pack (nasal pore) was placed.  They recommend discharge with ENT follow-up early next week.  No antibiotics needed.  Patient to start nasal saline every 3 hours tomorrow.    I reassessed the patient, she feels much better.  We discussed rescue  plan for any rebleeding at home.  Her hemoglobin is stable.  Her hemodynamics are improved as well.  Patient discharged in good condition.        I have reviewed the nursing notes. I have reviewed the findings, diagnosis, plan and need for follow up with the patient.    Discharge Medication List as of 12/10/2021  2:14 AM          Final diagnoses:   Epistaxis       --  Lucius Ojeda DO  [facility] EMERGENCY DEPARTMENT  12/9/2021     Lucius Ojeda DO  12/10/21 0237

## 2021-12-10 NOTE — DISCHARGE INSTRUCTIONS
Please make an appointment to follow up with [facility] (phone: [phone]) for follow up early next week.    If you re-bleed at home:  Spray afrin into bleeding nostril  Apply pressure for 15 mins  Can be repeated 3 times  If bleeding continues, return to the ED

## 2021-12-10 NOTE — CONSULTS
ENT CONSULT    Reason: epistaxis    HPI: Ms. Dimple Oviedo is an 88F w/ hx of CHF/Afib on xarelto, HLD, HTN, urothelial carcinoma who presents to the ED via EMS for nosebleed. She says this started about 10PM. In the ED a rhinorocket was placed which stopped the bleeding. She was bleeding from the left nostril and spitting out clots. ENT was called to assess further.    The patient has had some nosebleeds in the past. She has no history of nasal surgery. She is on anticoagulation. This was a spontaneous bleed.    PMH  Past Medical History:   Diagnosis Date     Arthritis      Calculus of kidney      Chemotherapy-induced neutropenia (H) 3/6/2019     Congestive heart failure (H)      Esophageal reflux      GERD (gastroesophageal reflux disease)      Hyperlipidemia LDL goal <130 5/9/2010     Malignant melanoma of skin of trunk, except scrotum (H)      Nonspecific abnormal finding     has living will 2004 -      Nontoxic multinodular goiter     no further eval /tx rec per pt     Osteopenia      Other psoriasis      Personal history of colonic polyps      PMR (polymyalgia rheumatica) (H)      Restless legs syndrome 7/11/2018     Stress-induced cardiomyopathy      Undiagnosed cardiac murmurs      Unspecified constipation      Unspecified essential hypertension      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     PSH  Past Surgical History:   Procedure Laterality Date     ARTHROPLASTY KNEE Right 11/9/2020    Procedure: ARTHROPLASTY, KNEE, TOTAL, RIGHT;  Surgeon: Edward Otero MD;  [facility]     BIOPSY       COLONOSCOPY  2014     COMBINED CYSTOSCOPY, INSERT STENT URETER(S) Bilateral 9/12/2018    Procedure: COMBINED CYSTOSCOPY, INSERT STENT URETER(S);  Cystoscopy Bilateral ureteral Stent Placement.;  Surgeon: Justin Henry MD;  [facility]     COMBINED CYSTOSCOPY, RETROGRADES, URETEROSCOPY, INSERT STENT Bilateral 4/3/2018    Procedure: COMBINED CYSTOSCOPY, RETROGRADES, URETEROSCOPY, INSERT STENT;;  Surgeon:  Stuart King MD;  [facility]     COMBINED CYSTOSCOPY, RETROGRADES, URETEROSCOPY, INSERT STENT Bilateral 9/10/2018    Procedure: COMBINED CYSTOSCOPY, RETROGRADES, URETEROSCOPY, INSERT STENT;  Cystoscopy, Bilateral Ureteroscopy, Bladder Biopsies, Retrogram Pyelograms, Ureteral Washings and brushings, cysview;  Surgeon: Stuart King MD;  [facility]     COMBINED CYSTOSCOPY, RETROGRADES, URETEROSCOPY, INSERT STENT Left 8/26/2020    Procedure: Cystoscopy, Left Ureteral Wash, Left ureteral Retrograde Pyelogram;  Surgeon: Justin Henry MD;  [facility]     CYSTOSCOPY, BIOPSY BLADDER INSTILL OPTICAL AGENT N/A 4/3/2018    Procedure: CYSTOSCOPY, BIOPSY BLADDER INSTILL OPTICAL AGENT;  Cystoscopy, Blue Light Cystoscopy, Bladder Biopsies, Bilateral Selective ureteral washings for Cytology, Bilateral Retrograde Pyelograms, Bilateral Ureteroscopy;  Surgeon: Stuart King MD;  [facility]     CYSTOSCOPY, BIOPSY BLADDER, COMBINED N/A 2/19/2018    Procedure: COMBINED CYSTOSCOPY, BIOPSY BLADDER;  Cystoscopy, Bladder Biopsy;  Surgeon: Kenna La MD;  [facility]     CYSTOSCOPY, BIOPSY BLADDER, COMBINED N/A 8/26/2020    Procedure: Cystoscopy, biopsy bladder, combined;  Surgeon: Justin Henry MD;  [facility]     CYSTOSCOPY, FULGURATE BLADDER TUMOR, COMBINED N/A 1/13/2020    Procedure: CYSTOSCOPY, WITH  bladder biopsies and fulguration;  Surgeon: Stuart King MD;  [facility]     CYSTOSCOPY, REMOVE STENT(S), COMBINED  11/13/2018    Procedure: Flexible Cystoscopy with Stent Removal;  Surgeon: Stuart King MD;  [facility]     DAVINCI LYMPHADENECTOMY N/A 11/13/2018    Procedure: Davinci Lymphadenectomy ;  Surgeon: Stuart King MD;  [facility]     DAVINCI NEPHROURETERECTOMY N/A 11/13/2018    Procedure: Right DaVinci Assisted Nephroureterectomy;  Surgeon: Stuart King MD;  [facility]      ENDOSCOPIC ULTRASOUND LOWER GASTROINTESTIONAL TRACT (GI) N/A 10/30/2015    Procedure: ENDOSCOPIC ULTRASOUND LOWER GASTROINTESTIONAL TRACT (GI);  Surgeon: Daniel Jean-Baptiste MD;  [facility]     EYE SURGERY  12/4/17     INSERT PORT VASCULAR ACCESS Right 12/19/2018    Procedure: Chest Port Placement - right;  Surgeon: Stuart Chavez PA-C;  [facility]     IR CHEST PORT PLACEMENT > 5 YRS OF AGE  12/19/2018     IR PORT REMOVAL RIGHT  6/26/2019     LAPAROSCOPIC CHOLECYSTECTOMY WITH CHOLANGIOGRAMS N/A 11/1/2015    Procedure: LAPAROSCOPIC CHOLECYSTECTOMY WITH CHOLANGIOGRAMS;  Surgeon: Tonie Warren MD;  [facility]     REMOVE PORT VASCULAR ACCESS Right 6/26/2019    Procedure: Right Port Removal;  Surgeon: Froilan Irizarry PA-C;  [facility]     SURGICAL HISTORY OF -   1996    malignant melanoma     SURGICAL HISTORY OF -   1968    thyroid nodule     SURGICAL HISTORY OF -       D & C     ZZC NONSPECIFIC PROCEDURE  2005    colonoscopy polyp repeat 2010     ALL     Allergies   Allergen Reactions     Codeine      SOC  Not assessed  FAM  Son lives in [place]    OBJECTIVE  BP (!) 144/103 (BP Location: Left arm)   Pulse 99   Temp 97.8  F (36.6  C) (Axillary)   Resp 22   SpO2 97%   GENERAL - older female sitting up in ED with nasal packing in place on left  FACE - HB1/6, CNV1-3 intact  NECK -soft, there is a midline scar presumably from prior thyroid surgery  ORAL CAV - dentition intact, tongue midline  OROPHARYNX - symmetrical palatal elevation, tonsils wnl  EARS - pinnae normal, cerumen left EAC, TM intact bl  NOSE - there is a rhinorocket in place on the left that is about custodial in. It is putting pressure on the ala  EYE - EOMI, PERRL  PULM - breathing comfortably on RA, voice is strong  NEURO - awake, alert, oriented, pleasant    PROCEDURE - nasal packing  The above was indicated because the rhino rocket did not appear to be sitting well. The balloon was taken down with a  syringe and the pack was removed. There was immediate bleeding w/ removal of the pack. Rigid nasal endoscopy was not attempted because the handpiece was not available. A nasopore absorbable pack was placed with forceps. The bleeding again resolved. The patient was stable.    ASSESSMENT  Dimple Oviedo is an 88F w/ Afib on xarelto who developed epistaxis this evening. This was eventually controlled with an absorbable pack (nasopore.)    PLAN  - afrin if bleeding at home, if that does not work she will have to return to ED  - nasal saline q3h starting tomorrow  - recommend ENT f/u early next week  - no ABx necessary from ENT perspective    Maxim Scherer MD PGY3    Pt to be discussed w/ attdg surgeon Dr. Adeline Chaney

## 2021-12-13 ENCOUNTER — TELEPHONE (OUTPATIENT)
Dept: OTOLARYNGOLOGY | Facility: CLINIC | Age: 86
End: 2021-12-13
Payer: MEDICARE

## 2021-12-20 DIAGNOSIS — E78.5 HYPERLIPIDEMIA LDL GOAL <130: ICD-10-CM

## 2021-12-20 RX ORDER — LOVASTATIN 40 MG
40 TABLET ORAL AT BEDTIME
Qty: 90 TABLET | Refills: 3 | Status: SHIPPED | OUTPATIENT
Start: 2021-12-20 | End: 2022-12-09

## 2021-12-20 NOTE — TELEPHONE ENCOUNTER
Drug-drug interaction between diltiazem and lovastatin    [facility] Protocol Passed 12/20/2021 05:14 PM   Protocol Details  LDL on file in past 12 months    No abnormal creatine kinase in past 12 months    Recent (12 mo) or future (30 days) visit within the authorizing provider's specialty    Medication is active on med list    Patient is age 18 or older    No active pregnancy on record    No positive pregnancy test in past 12 months

## 2021-12-22 ENCOUNTER — PRE VISIT (OUTPATIENT)
Dept: OTOLARYNGOLOGY | Facility: CLINIC | Age: 86
End: 2021-12-22

## 2021-12-22 ENCOUNTER — OFFICE VISIT (OUTPATIENT)
Dept: OTOLARYNGOLOGY | Facility: CLINIC | Age: 86
End: 2021-12-22
Payer: MEDICARE

## 2021-12-22 VITALS
OXYGEN SATURATION: 94 % | SYSTOLIC BLOOD PRESSURE: 107 MMHG | TEMPERATURE: 97.7 F | WEIGHT: 169 LBS | DIASTOLIC BLOOD PRESSURE: 58 MMHG | HEIGHT: 57 IN | BODY MASS INDEX: 36.46 KG/M2 | HEART RATE: 67 BPM

## 2021-12-22 DIAGNOSIS — J34.89 NASAL DRYNESS: Primary | ICD-10-CM

## 2021-12-22 DIAGNOSIS — R04.0 EPISTAXIS: ICD-10-CM

## 2021-12-22 PROCEDURE — 99214 OFFICE O/P EST MOD 30 MIN: CPT | Performed by: REGISTERED NURSE

## 2021-12-22 RX ORDER — ECHINACEA PURPUREA EXTRACT 125 MG
2 TABLET ORAL 2 TIMES DAILY
Qty: 30 ML | Refills: 11 | Status: SHIPPED | OUTPATIENT
Start: 2021-12-22 | End: 2022-01-21

## 2021-12-22 ASSESSMENT — MIFFLIN-ST. JEOR: SCORE: 1070.46

## 2021-12-22 ASSESSMENT — PAIN SCALES - GENERAL: PAINLEVEL: NO PAIN (0)

## 2021-12-22 NOTE — LETTER
12/22/2021       RE: Dimple Oviedo  [address]  [facility] [address]     Dear Colleague,    Thank you for referring your patient, Dimple Oviedo, to the [facility] E[facility]IN[facility] [place] [facility]. Please see a copy of my visit note below.    [facility]   Head and Neck Surgery  December 22, 2021     HPI: Dimple Oviedo is a 88 year old female who presents today for ED follow up for epistaxis. Patient has a history of Afib on xarelto. Sudden episode of epistaxis out of left nare that she was not able to stop bleeding at home and presented to ED for further treatment. Seen by the ENT team who placed absorbable packing with instructions to use frequent nasal saline irrigations and follow up in clinic.     Today, patient is accompanied by son. She has had no nosebleeds. Patient used nasal saline spray up until a few days ago. She has purchased a humidifier. Left nare congested until 2 days ago when she blew nose. No bloody mucus or evidence of packing per patient.     Patient states that she has a history of nosebleeds from left nare. Can occur suddenly or when she is straining with a BM. Always from the left nare. Patient does not have any history of chronic sinusitis or sinus surgeries.     Past Medical History:  Past Medical History:   Diagnosis Date     Arthritis      Calculus of kidney      Chemotherapy-induced neutropenia (H) 3/6/2019     Congestive heart failure (H)      Esophageal reflux      GERD (gastroesophageal reflux disease)      Hyperlipidemia LDL goal <130 5/9/2010     Malignant melanoma of skin of trunk, except scrotum (H)      Nonspecific abnormal finding     has living will 2004 -      Nontoxic multinodular goiter     no further eval /tx rec per pt     Osteopenia      Other psoriasis      Personal history of colonic polyps      PMR (polymyalgia rheumatica) (H)      Restless legs  syndrome 7/11/2018     Stress-induced cardiomyopathy      Undiagnosed cardiac murmurs      Unspecified constipation      Unspecified essential hypertension      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     Past Surgical History:  Past Surgical History:   Procedure Laterality Date     ARTHROPLASTY KNEE Right 11/9/2020    Procedure: ARTHROPLASTY, KNEE, TOTAL, RIGHT;  Surgeon: Edward Otero MD;  [facility]     BIOPSY       COLONOSCOPY  2014     COMBINED CYSTOSCOPY, INSERT STENT URETER(S) Bilateral 9/12/2018    Procedure: COMBINED CYSTOSCOPY, INSERT STENT URETER(S);  Cystoscopy Bilateral ureteral Stent Placement.;  Surgeon: Justin Henry MD;  [facility]     COMBINED CYSTOSCOPY, RETROGRADES, URETEROSCOPY, INSERT STENT Bilateral 4/3/2018    Procedure: COMBINED CYSTOSCOPY, RETROGRADES, URETEROSCOPY, INSERT STENT;;  Surgeon: Stuart King MD;  [facility]     COMBINED CYSTOSCOPY, RETROGRADES, URETEROSCOPY, INSERT STENT Bilateral 9/10/2018    Procedure: COMBINED CYSTOSCOPY, RETROGRADES, URETEROSCOPY, INSERT STENT;  Cystoscopy, Bilateral Ureteroscopy, Bladder Biopsies, Retrogram Pyelograms, Ureteral Washings and brushings, cysview;  Surgeon: Stuart King MD;  [facility]     COMBINED CYSTOSCOPY, RETROGRADES, URETEROSCOPY, INSERT STENT Left 8/26/2020    Procedure: Cystoscopy, Left Ureteral Wash, Left ureteral Retrograde Pyelogram;  Surgeon: Justin Henry MD;  [facility]     CYSTOSCOPY, BIOPSY BLADDER INSTILL OPTICAL AGENT N/A 4/3/2018    Procedure: CYSTOSCOPY, BIOPSY BLADDER INSTILL OPTICAL AGENT;  Cystoscopy, Blue Light Cystoscopy, Bladder Biopsies, Bilateral Selective ureteral washings for Cytology, Bilateral Retrograde Pyelograms, Bilateral Ureteroscopy;  Surgeon: Stuart King MD;  [facility]     CYSTOSCOPY, BIOPSY BLADDER, COMBINED N/A 2/19/2018    Procedure: COMBINED CYSTOSCOPY, BIOPSY BLADDER;  Cystoscopy, Bladder Biopsy;  Surgeon: Bessie  Kenna Madrid MD;  [facility]     CYSTOSCOPY, BIOPSY BLADDER, COMBINED N/A 8/26/2020    Procedure: Cystoscopy, biopsy bladder, combined;  Surgeon: Justin Henry MD;  [facility]     CYSTOSCOPY, FULGURATE BLADDER TUMOR, COMBINED N/A 1/13/2020    Procedure: CYSTOSCOPY, WITH  bladder biopsies and fulguration;  Surgeon: Stuart King MD;  [facility]     CYSTOSCOPY, REMOVE STENT(S), COMBINED  11/13/2018    Procedure: Flexible Cystoscopy with Stent Removal;  Surgeon: Stuart King MD;  [facility]     DAVINCI LYMPHADENECTOMY N/A 11/13/2018    Procedure: Davinci Lymphadenectomy ;  Surgeon: Stuart King MD;  [facility]     DAVINCI NEPHROURETERECTOMY N/A 11/13/2018    Procedure: Right DaVinci Assisted Nephroureterectomy;  Surgeon: Stuart King MD;  [facility]     ENDOSCOPIC ULTRASOUND LOWER GASTROINTESTIONAL TRACT (GI) N/A 10/30/2015    Procedure: ENDOSCOPIC ULTRASOUND LOWER GASTROINTESTIONAL TRACT (GI);  Surgeon: Daniel Jean-Baptiste MD;  [facility]     EYE SURGERY  12/4/17     INSERT PORT VASCULAR ACCESS Right 12/19/2018    Procedure: Chest Port Placement - right;  Surgeon: Stuart Chavez PA-C;  [facility]     IR CHEST PORT PLACEMENT > 5 YRS OF AGE  12/19/2018     IR PORT REMOVAL RIGHT  6/26/2019     LAPAROSCOPIC CHOLECYSTECTOMY WITH CHOLANGIOGRAMS N/A 11/1/2015    Procedure: LAPAROSCOPIC CHOLECYSTECTOMY WITH CHOLANGIOGRAMS;  Surgeon: Tonie Warren MD;  [facility]     REMOVE PORT VASCULAR ACCESS Right 6/26/2019    Procedure: Right Port Removal;  Surgeon: Froilan Irizarry PA-C;  [facility]     SURGICAL HISTORY OF -   1996    malignant melanoma     SURGICAL HISTORY OF -   1968    thyroid nodule     SURGICAL HISTORY OF -       D & C     ZZC NONSPECIFIC PROCEDURE  2005    colonoscopy polyp repeat 2010     Medications:  Current Outpatient Medications   Medication Sig Dispense Refill      alendronate (FOSAMAX) 70 MG tablet TAKE 1 TABLET EVERY 7 DAYS AT LEAST 60 MINUTES BEFORE BREAKFAST AS DIRECTED. 12 tablet 3     amoxicillin (AMOXIL) 500 MG tablet Take 4 tablets (2000 mg) by mouth 1 hour before dental procedures/cleanings. 4 tablet 3     bisacodyl (DULCOLAX) 5 MG EC tablet Take 2 tablets by mouth at bedtime two days prior to procedure.  Take 2 tabs by mouth at 3pm one day prior to procedure. 4 tablet 0     diltiazem ER (DILT-XR) 180 MG 24 hr capsule Take 1 capsule (180 mg) by mouth daily 90 capsule 0     diltiazem ER COATED BEADS (CARDIZEM CD/CARTIA XT) 180 MG 24 hr capsule        ferrous sulfate 325 (65 Fe) MG TBEC EC tablet Take 325 mg by mouth every morning        furosemide (LASIX) 20 MG tablet Take 1 tablet (20 mg) by mouth 2 times daily .  Take in the morning and after lunch. 90 tablet 1     irbesartan (AVAPRO) 300 MG tablet TAKE 1 TABLET EVERY DAY 90 tablet 2     lovastatin (MEVACOR) 40 MG tablet Take 1 tablet (40 mg) by mouth At Bedtime 90 tablet 3     methimazole (TAPAZOLE) 5 MG tablet Take 5 mg alternating with 2.5 mg every other day 90 tablet 3     Omega-3 Fatty Acids (FISH OIL) 500 MG CAPS Take 1 capsule by mouth every morning        omeprazole (PRILOSEC) 20 MG DR capsule TAKE 1 CAPSULE EVERY DAY 90 capsule 1     ondansetron (ZOFRAN-ODT) 4 MG ODT tab Take 1 tablet (4 mg) by mouth every 6 hours as needed for nausea or vomiting 20 tablet 0     propranolol ER (INDERAL LA) 60 MG 24 hr capsule TAKE 1 CAPSULE EVERY DAY 30 capsule 0     rivaroxaban ANTICOAGULANT (XARELTO) 15 MG TABS tablet Take 1 tablet (15 mg) by mouth daily (with dinner) 90 tablet 0     rOPINIRole (REQUIP) 0.25 MG tablet TAKE 1 TABLET IN THE AFTERNOON AND TAKE 2 TABLETS EVERY NIGHT 270 tablet 3     sertraline (ZOLOFT) 100 MG tablet TAKE 1 TABLET EVERY MORNING 90 tablet 3     sodium chloride (OCEAN) 0.65 % nasal spray Spray 2 sprays in nostril 2 times daily 30 mL 11     traZODone (DESYREL) 50 MG tablet TAKE 1/2 TABLET AT  "BEDTIME 45 tablet 3     Allergies:  Allergies   Allergen Reactions     Codeine         Social History:  Social History     Tobacco Use     Smoking status: Never Smoker     Smokeless tobacco: Never Used   Vaping Use     Vaping Use: Never used   Substance Use Topics     Alcohol use: No     Alcohol/week: 0.0 standard drinks     Comment: rare     Drug use: No     ROS: 10 point ROS neg other than the symptoms noted above in the HPI.    Physical Exam:    /58   Pulse 67   Temp 97.7  F (36.5  C)   Ht 1.448 m (4' 9\")   Wt 76.7 kg (169 lb)   SpO2 94%   BMI 36.57 kg/m       Constitutional:  The patient was well-groomed and in no acute distress.     Neurologic: Alert and oriented x 3.    Psychiatric: The patient's affect was calm, cooperative, and appropriate.     Communication:  Normal; communicates verbally, normal voice quality.   Respiratory: Breathing comfortably without stridor or exertion of accessory muscles.   Nose: Flexible scope used to assess left nasal cavity. There was some mucus crusting. Mucosal membranes were moist without evidence of bleeding. No residual packing visualized.       Assessment/Plan:  1. Nasal dryness  Due to mucus crusting seen in left nare, recommend that patient continue nasal saline spray to nare for irrigation and moisture. Continue humidity as mucosal membranes appear moisturized on today's exam.     - sodium chloride (OCEAN) 0.65 % nasal spray; Spray 2 sprays in nostril 2 times daily  Dispense: 30 mL; Refill: 11    2. Epistaxis  Patient with recent epistaxis requiring a visit to the ED for management. There is no evidence of bleeding on today's exam. Discussed using scheduled stool softeners to prevent straining that has previously led to nosebleeds. Continue to monitor and consider scheduling consult with rhinology for epistaxis management if hydration and moisture do not improve symptoms.      Nicolasa Hook DNP, APRN, CNP  Otolaryngology  Head & Neck " Surgery  [phone]    30 minutes spent on the date of the encounter doing chart review, history and exam, documentation and further activities per the note

## 2021-12-22 NOTE — NURSING NOTE
"Chief Complaint   Patient presents with     Consult     epitaxis -follow up after ER visit      Blood pressure 107/58, pulse 67, temperature 97.7  F (36.5  C), height 1.448 m (4' 9\"), weight 76.7 kg (169 lb), SpO2 94 %, not currently breastfeeding.    Christopher Vale LPN    "

## 2021-12-22 NOTE — TELEPHONE ENCOUNTER
FUTURE VISIT INFORMATION      FUTURE VISIT INFORMATION:    Date: 12/22/2022    Time: 11:30AM    [place] [facility] [place]:    Referring provider:      Referring providers clinic:      Reason for visit/diagnosis  Epistaxis f/up from ED on 12/09. Time approved by LILY Pompa [id number]    RECORDS REQUESTED FROM:       Clinic name Comments Records Status Imaging Status   [facility] ED 12/9/2021 ED note from Dr Lucius Ojeda  2/7/2019 note from Dr Lorne Macdonald Epic    Imaging 4/29/2019 MR Brain  Epic PACS   [facility] 6/3/2019 note from Dr Arias  4/10/2019 note from Dr Mulu KRUEGER

## 2021-12-22 NOTE — PROGRESS NOTES
[facility]   Head and Neck Surgery  December 22, 2021     HPI: Dimple Oviedo is a 88 year old female who presents today for ED follow up for epistaxis. Patient has a history of Afib on xarelto. Sudden episode of epistaxis out of left nare that she was not able to stop bleeding at home and presented to ED for further treatment. Seen by the ENT team who placed absorbable packing with instructions to use frequent nasal saline irrigations and follow up in clinic.     Today, patient is accompanied by son. She has had no nosebleeds. Patient used nasal saline spray up until a few days ago. She has purchased a humidifier. Left nare congested until 2 days ago when she blew nose. No bloody mucus or evidence of packing per patient.     Patient states that she has a history of nosebleeds from left nare. Can occur suddenly or when she is straining with a BM. Always from the left nare. Patient does not have any history of chronic sinusitis or sinus surgeries.     Past Medical History:  Past Medical History:   Diagnosis Date     Arthritis      Calculus of kidney      Chemotherapy-induced neutropenia (H) 3/6/2019     Congestive heart failure (H)      Esophageal reflux      GERD (gastroesophageal reflux disease)      Hyperlipidemia LDL goal <130 5/9/2010     Malignant melanoma of skin of trunk, except scrotum (H)      Nonspecific abnormal finding     has living will 2004 -      Nontoxic multinodular goiter     no further eval /tx rec per pt     Osteopenia      Other psoriasis      Personal history of colonic polyps      PMR (polymyalgia rheumatica) (H)      Restless legs syndrome 7/11/2018     Stress-induced cardiomyopathy      Undiagnosed cardiac murmurs      Unspecified constipation      Unspecified essential hypertension      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     Past Surgical History:  Past Surgical History:   Procedure Laterality Date     ARTHROPLASTY KNEE Right 11/9/2020    Procedure: ARTHROPLASTY,  KNEE, TOTAL, RIGHT;  Surgeon: Edward Otero MD;  [facility]     BIOPSY       COLONOSCOPY  2014     COMBINED CYSTOSCOPY, INSERT STENT URETER(S) Bilateral 9/12/2018    Procedure: COMBINED CYSTOSCOPY, INSERT STENT URETER(S);  Cystoscopy Bilateral ureteral Stent Placement.;  Surgeon: Justin Henry MD;  [facility]     COMBINED CYSTOSCOPY, RETROGRADES, URETEROSCOPY, INSERT STENT Bilateral 4/3/2018    Procedure: COMBINED CYSTOSCOPY, RETROGRADES, URETEROSCOPY, INSERT STENT;;  Surgeon: Stuart King MD;  [facility]     COMBINED CYSTOSCOPY, RETROGRADES, URETEROSCOPY, INSERT STENT Bilateral 9/10/2018    Procedure: COMBINED CYSTOSCOPY, RETROGRADES, URETEROSCOPY, INSERT STENT;  Cystoscopy, Bilateral Ureteroscopy, Bladder Biopsies, Retrogram Pyelograms, Ureteral Washings and brushings, cysview;  Surgeon: Stuart King MD;  [facility]     COMBINED CYSTOSCOPY, RETROGRADES, URETEROSCOPY, INSERT STENT Left 8/26/2020    Procedure: Cystoscopy, Left Ureteral Wash, Left ureteral Retrograde Pyelogram;  Surgeon: Justin Henry MD;  [facility]     CYSTOSCOPY, BIOPSY BLADDER INSTILL OPTICAL AGENT N/A 4/3/2018    Procedure: CYSTOSCOPY, BIOPSY BLADDER INSTILL OPTICAL AGENT;  Cystoscopy, Blue Light Cystoscopy, Bladder Biopsies, Bilateral Selective ureteral washings for Cytology, Bilateral Retrograde Pyelograms, Bilateral Ureteroscopy;  Surgeon: Stuart King MD;  [facility]     CYSTOSCOPY, BIOPSY BLADDER, COMBINED N/A 2/19/2018    Procedure: COMBINED CYSTOSCOPY, BIOPSY BLADDER;  Cystoscopy, Bladder Biopsy;  Surgeon: Kenna La MD;  [facility]     CYSTOSCOPY, BIOPSY BLADDER, COMBINED N/A 8/26/2020    Procedure: Cystoscopy, biopsy bladder, combined;  Surgeon: Justin Henry MD;  [facility]     CYSTOSCOPY, FULGURATE BLADDER TUMOR, COMBINED N/A 1/13/2020    Procedure: CYSTOSCOPY, WITH  bladder biopsies and fulguration;  Surgeon:  Stuart King MD;  [facility]     CYSTOSCOPY, REMOVE STENT(S), COMBINED  11/13/2018    Procedure: Flexible Cystoscopy with Stent Removal;  Surgeon: Stuart King MD;  [facility]     DAVINCI LYMPHADENECTOMY N/A 11/13/2018    Procedure: Davinci Lymphadenectomy ;  Surgeon: Stuart King MD;  [facility]     DAVINCI NEPHROURETERECTOMY N/A 11/13/2018    Procedure: Right DaVinci Assisted Nephroureterectomy;  Surgeon: Stuart King MD;  [facility]     ENDOSCOPIC ULTRASOUND LOWER GASTROINTESTIONAL TRACT (GI) N/A 10/30/2015    Procedure: ENDOSCOPIC ULTRASOUND LOWER GASTROINTESTIONAL TRACT (GI);  Surgeon: Daniel Jean-Baptiste MD;  [facility]     EYE SURGERY  12/4/17     INSERT PORT VASCULAR ACCESS Right 12/19/2018    Procedure: Chest Port Placement - right;  Surgeon: Stuart Chavez PA-C;  [facility]     IR CHEST PORT PLACEMENT > 5 YRS OF AGE  12/19/2018     IR PORT REMOVAL RIGHT  6/26/2019     LAPAROSCOPIC CHOLECYSTECTOMY WITH CHOLANGIOGRAMS N/A 11/1/2015    Procedure: LAPAROSCOPIC CHOLECYSTECTOMY WITH CHOLANGIOGRAMS;  Surgeon: Tonie Warren MD;  [facility]     REMOVE PORT VASCULAR ACCESS Right 6/26/2019    Procedure: Right Port Removal;  Surgeon: Froilan Irizarry PA-C;  [facility]     SURGICAL HISTORY OF -   1996    malignant melanoma     SURGICAL HISTORY OF -   1968    thyroid nodule     SURGICAL HISTORY OF -       D & C     ZZC NONSPECIFIC PROCEDURE  2005    colonoscopy polyp repeat 2010     Medications:  Current Outpatient Medications   Medication Sig Dispense Refill     alendronate (FOSAMAX) 70 MG tablet TAKE 1 TABLET EVERY 7 DAYS AT LEAST 60 MINUTES BEFORE BREAKFAST AS DIRECTED. 12 tablet 3     amoxicillin (AMOXIL) 500 MG tablet Take 4 tablets (2000 mg) by mouth 1 hour before dental procedures/cleanings. 4 tablet 3     bisacodyl (DULCOLAX) 5 MG EC tablet Take 2 tablets by mouth at bedtime two days prior to  procedure.  Take 2 tabs by mouth at 3pm one day prior to procedure. 4 tablet 0     diltiazem ER (DILT-XR) 180 MG 24 hr capsule Take 1 capsule (180 mg) by mouth daily 90 capsule 0     diltiazem ER COATED BEADS (CARDIZEM CD/CARTIA XT) 180 MG 24 hr capsule        ferrous sulfate 325 (65 Fe) MG TBEC EC tablet Take 325 mg by mouth every morning        furosemide (LASIX) 20 MG tablet Take 1 tablet (20 mg) by mouth 2 times daily .  Take in the morning and after lunch. 90 tablet 1     irbesartan (AVAPRO) 300 MG tablet TAKE 1 TABLET EVERY DAY 90 tablet 2     lovastatin (MEVACOR) 40 MG tablet Take 1 tablet (40 mg) by mouth At Bedtime 90 tablet 3     methimazole (TAPAZOLE) 5 MG tablet Take 5 mg alternating with 2.5 mg every other day 90 tablet 3     Omega-3 Fatty Acids (FISH OIL) 500 MG CAPS Take 1 capsule by mouth every morning        omeprazole (PRILOSEC) 20 MG DR capsule TAKE 1 CAPSULE EVERY DAY 90 capsule 1     ondansetron (ZOFRAN-ODT) 4 MG ODT tab Take 1 tablet (4 mg) by mouth every 6 hours as needed for nausea or vomiting 20 tablet 0     propranolol ER (INDERAL LA) 60 MG 24 hr capsule TAKE 1 CAPSULE EVERY DAY 30 capsule 0     rivaroxaban ANTICOAGULANT (XARELTO) 15 MG TABS tablet Take 1 tablet (15 mg) by mouth daily (with dinner) 90 tablet 0     rOPINIRole (REQUIP) 0.25 MG tablet TAKE 1 TABLET IN THE AFTERNOON AND TAKE 2 TABLETS EVERY NIGHT 270 tablet 3     sertraline (ZOLOFT) 100 MG tablet TAKE 1 TABLET EVERY MORNING 90 tablet 3     sodium chloride (OCEAN) 0.65 % nasal spray Spray 2 sprays in nostril 2 times daily 30 mL 11     traZODone (DESYREL) 50 MG tablet TAKE 1/2 TABLET AT BEDTIME 45 tablet 3     Allergies:  Allergies   Allergen Reactions     Codeine         Social History:  Social History     Tobacco Use     Smoking status: Never Smoker     Smokeless tobacco: Never Used   Vaping Use     Vaping Use: Never used   Substance Use Topics     Alcohol use: No     Alcohol/week: 0.0 standard drinks     Comment: rare     Drug  "use: No     ROS: 10 point ROS neg other than the symptoms noted above in the HPI.    Physical Exam:    /58   Pulse 67   Temp 97.7  F (36.5  C)   Ht 1.448 m (4' 9\")   Wt 76.7 kg (169 lb)   SpO2 94%   BMI 36.57 kg/m       Constitutional:  The patient was well-groomed and in no acute distress.     Neurologic: Alert and oriented x 3.    Psychiatric: The patient's affect was calm, cooperative, and appropriate.     Communication:  Normal; communicates verbally, normal voice quality.   Respiratory: Breathing comfortably without stridor or exertion of accessory muscles.   Nose: Flexible scope used to assess left nasal cavity. There was some mucus crusting. Mucosal membranes were moist without evidence of bleeding. No residual packing visualized.       Assessment/Plan:  1. Nasal dryness  Due to mucus crusting seen in left nare, recommend that patient continue nasal saline spray to nare for irrigation and moisture. Continue humidity as mucosal membranes appear moisturized on today's exam.     - sodium chloride (OCEAN) 0.65 % nasal spray; Spray 2 sprays in nostril 2 times daily  Dispense: 30 mL; Refill: 11    2. Epistaxis  Patient with recent epistaxis requiring a visit to the ED for management. There is no evidence of bleeding on today's exam. Discussed using scheduled stool softeners to prevent straining that has previously led to nosebleeds. Continue to monitor and consider scheduling consult with rhinology for epistaxis management if hydration and moisture do not improve symptoms.      Nicolasa Hook DNP, APRN, CNP  Otolaryngology  Head & Neck Surgery  [phone]    30 minutes spent on the date of the encounter doing chart review, history and exam, documentation and further activities per the note    "

## 2021-12-25 ENCOUNTER — HEALTH MAINTENANCE LETTER (OUTPATIENT)
Age: 86
End: 2021-12-25

## 2021-12-30 DIAGNOSIS — E04.2 NONTOXIC MULTINODULAR GOITER: ICD-10-CM

## 2021-12-30 DIAGNOSIS — E05.00 GRAVES DISEASE: ICD-10-CM

## 2022-01-03 NOTE — TELEPHONE ENCOUNTER
methimazole (TAPAZOLE) 5 MG tablet      Last Written Prescription Date:  6/4/2021  Last Fill Quantity: 90,   # refills: 3  Last Office Visit : 10/12/2021  Future Office visit:  5/31/2022    Routing refill request to provider for review/approval because:  Medication not on the Endocrine refill protocol.  - new pharmacy needs script

## 2022-01-04 RX ORDER — METHIMAZOLE 5 MG/1
TABLET ORAL
Qty: 90 TABLET | Refills: 1 | Status: SHIPPED | OUTPATIENT
Start: 2022-01-04 | End: 2022-06-03

## 2022-01-04 NOTE — TELEPHONE ENCOUNTER
[facility] Call Center    Phone Message    May a detailed message be left on voicemail: yes     Reason for Call: Medication Refill Request    Has the patient contacted the pharmacy for the refill? Yes   Name of medication being requested:methimazole (TAPAZOLE) 5 MG tablet  Provider who prescribed the medication: Derrick  Pharmacy: [facility] Fax: [phone]  Date medication is needed:whenever possible- pt submitted a refill request      Action Taken: Message routed to:  [facility] Endo    Travel Screening: Not Applicable                                                                       no

## 2022-01-04 NOTE — TELEPHONE ENCOUNTER
Message routed for refill to Dr Meza 1/3/22. Methimazole not on Endocrine protocol, however may be sent as fulfillment of previous one year supply per protocol.      New pharmacy ( [facility]) needs script, previous 6/4/21  90 day with 3 refills sent to:    [id number] - [place], [place] - [address] AT [facility] OF [address]      Now requested to  [facility] - [place], [place] - [address]    Last Office Visit : 10/12/2021(No changes in methimazole dose. )  Future Office visit:  5/31/2022

## 2022-01-10 DIAGNOSIS — K21.9 GASTROESOPHAGEAL REFLUX DISEASE WITHOUT ESOPHAGITIS: ICD-10-CM

## 2022-01-11 NOTE — TELEPHONE ENCOUNTER
Prescription approved per [facility] Refill Protocol.  Talia Mckinney RN  [facility]    Next visit: 1/24/22 [place]

## 2022-01-17 DIAGNOSIS — E04.2 NONTOXIC MULTINODULAR GOITER: ICD-10-CM

## 2022-01-18 RX ORDER — PROPRANOLOL HCL 60 MG
CAPSULE, EXTENDED RELEASE 24HR ORAL
Qty: 30 CAPSULE | Refills: 0 | Status: SHIPPED | OUTPATIENT
Start: 2022-01-18 | End: 2022-01-24

## 2022-01-18 NOTE — TELEPHONE ENCOUNTER
1 refill approved has a visit scheduled for 1/24/20222.    Beta-Blockers Protocol Passed 01/17/2022 03:38 PM   Protocol Details  Blood pressure under 140/90 in past 12 months    Patient is age 6 or older    Recent (12 mo) or future (30 days) visit within the authorizing provider's specialty    Medication is active on med list

## 2022-01-24 ENCOUNTER — OFFICE VISIT (OUTPATIENT)
Dept: FAMILY MEDICINE | Facility: CLINIC | Age: 87
End: 2022-01-24
Payer: MEDICARE

## 2022-01-24 VITALS
TEMPERATURE: 97 F | HEART RATE: 83 BPM | DIASTOLIC BLOOD PRESSURE: 81 MMHG | SYSTOLIC BLOOD PRESSURE: 129 MMHG | OXYGEN SATURATION: 95 %

## 2022-01-24 DIAGNOSIS — I10 HYPERTENSION GOAL BP (BLOOD PRESSURE) < 140/90: Primary | ICD-10-CM

## 2022-01-24 DIAGNOSIS — E04.2 NONTOXIC MULTINODULAR GOITER: ICD-10-CM

## 2022-01-24 DIAGNOSIS — M85.80 OSTEOPENIA, UNSPECIFIED LOCATION: ICD-10-CM

## 2022-01-24 PROCEDURE — 99214 OFFICE O/P EST MOD 30 MIN: CPT | Performed by: FAMILY MEDICINE

## 2022-01-24 RX ORDER — ALENDRONATE SODIUM 70 MG/1
TABLET ORAL
Qty: 12 TABLET | Refills: 3 | Status: SHIPPED | OUTPATIENT
Start: 2022-01-24 | End: 2022-03-09

## 2022-01-24 RX ORDER — PROPRANOLOL HCL 60 MG
CAPSULE, EXTENDED RELEASE 24HR ORAL
Qty: 90 CAPSULE | Refills: 3 | Status: SHIPPED | OUTPATIENT
Start: 2022-01-24 | End: 2023-03-15

## 2022-01-24 NOTE — PROGRESS NOTES
"  Assessment & Plan     Osteopenia, unspecified location   Patient is tolerating current medication without any major side effects of concerns and current dose seems reasonable too.  Current medication regime is effective. Continue current treatment without any changes.   - alendronate (FOSAMAX) 70 MG tablet; TAKE 1 TABLET EVERY 7 DAYS AT LEAST 60 MINUTES BEFORE BREAKFAST AS DIRECTED.    Nontoxic multinodular goiter  Has been using propranolol for her blood pressure and  it was also associated with multinodular goiter.  Continue the same Rx.  - propranolol ER (INDERAL LA) 60 MG 24 hr capsule; TAKE 1 CAPSULE EVERY DAY    Hypertension goal BP (blood pressure) < 140/90  Patient is tolerating current medication without any major side effects of concerns and current dose seems reasonable too.  Current medication regime is effective. Continue current treatment without any changes.   - propranolol ER (INDERAL LA) 60 MG 24 hr capsule; TAKE 1 CAPSULE EVERY DAY             BMI:   Estimated body mass index is 36.57 kg/m  as calculated from the following:    Height as of 12/22/21: 1.448 m (4' 9\").    Weight as of 12/22/21: 76.7 kg (169 lb).           Return in about 6 months (around 7/24/2022).    Pop Zapien MD, MD  [facility]    Davey Musa is a 88 year old who presents for the following health issues     HPI     Hypertension Follow-up      Do you check your blood pressure regularly outside of the clinic? Yes     Are you following a low salt diet? Yes    Are your blood pressures ever more than 140 on the top number (systolic) OR more   than 90 on the bottom number (diastolic), for example 140/90? No      How many servings of fruits and vegetables do you eat daily?  2-3    On average, how many sweetened beverages do you drink each day (Examples: soda, juice, sweet tea, etc.  Do NOT count diet or artificially sweetened beverages)?   2    How many days per week do you exercise " enough to make your heart beat faster? 3 or less    How many minutes a day do you exercise enough to make your heart beat faster? 9 or less    How many days per week do you miss taking your medication? 0    Still having some blood in stool. Feels it is hemorrhoid.  Not using stool softner on daily basis.     Bloody nose - been to ENT. Using nasal spray.     Gets tired easily. Walking - short of breath.     Using xarelto.     On propranolol for long time.     Review of Systems         Objective    /81 (BP Location: Right arm, Patient Position: Chair, Cuff Size: Adult Regular)   Pulse 83   Temp 97  F (36.1  C) (Temporal)   SpO2 95%   There is no height or weight on file to calculate BMI.  Physical Exam   Minimal leg swelling.

## 2022-01-25 PROBLEM — M85.80 OSTEOPENIA, UNSPECIFIED LOCATION: Status: ACTIVE | Noted: 2022-01-25

## 2022-02-07 ENCOUNTER — HOSPITAL ENCOUNTER (EMERGENCY)
Facility: CLINIC | Age: 87
Discharge: HOME | End: 2022-02-07
Attending: EMERGENCY MEDICINE | Admitting: EMERGENCY MEDICINE
Payer: MEDICARE

## 2022-02-07 VITALS
HEART RATE: 102 BPM | DIASTOLIC BLOOD PRESSURE: 100 MMHG | WEIGHT: 168 LBS | TEMPERATURE: 97.4 F | RESPIRATION RATE: 18 BRPM | BODY MASS INDEX: 36.24 KG/M2 | OXYGEN SATURATION: 97 % | SYSTOLIC BLOOD PRESSURE: 144 MMHG | HEIGHT: 57 IN

## 2022-02-07 DIAGNOSIS — R04.0 EPISTAXIS: ICD-10-CM

## 2022-02-07 LAB
HOLD SPECIMEN: NORMAL

## 2022-02-07 PROCEDURE — 250N000013 HC RX MED GY IP 250 OP 250 PS 637: Performed by: EMERGENCY MEDICINE

## 2022-02-07 PROCEDURE — 250N000009 HC RX 250

## 2022-02-07 PROCEDURE — 30901 CONTROL OF NOSEBLEED: CPT | Mod: LT | Performed by: EMERGENCY MEDICINE

## 2022-02-07 PROCEDURE — 99283 EMERGENCY DEPT VISIT LOW MDM: CPT | Mod: 25 | Performed by: EMERGENCY MEDICINE

## 2022-02-07 PROCEDURE — 99283 EMERGENCY DEPT VISIT LOW MDM: CPT | Performed by: EMERGENCY MEDICINE

## 2022-02-07 RX ORDER — LIDOCAINE HYDROCHLORIDE AND EPINEPHRINE 10; 10 MG/ML; UG/ML
INJECTION, SOLUTION INFILTRATION; PERINEURAL
Status: COMPLETED
Start: 2022-02-07 | End: 2022-02-07

## 2022-02-07 RX ADMIN — LIDOCAINE HYDROCHLORIDE,EPINEPHRINE BITARTRATE 20 ML: 10; .01 INJECTION, SOLUTION INFILTRATION; PERINEURAL at 03:25

## 2022-02-07 RX ADMIN — PHENYLEPHRINE HYDROCHLORIDE 1 SPRAY: 0.25 SPRAY NASAL at 00:59

## 2022-02-07 ASSESSMENT — MIFFLIN-ST. JEOR: SCORE: 1065.92

## 2022-02-07 NOTE — ED TRIAGE NOTES
Patient BIBA for nosebleed that started at 2300, EMS placed gauze and [facility] clip at 0000[facility] [place]

## 2022-02-07 NOTE — ED NOTES
Bed: ED25  Expected date:   Expected time:   Means of arrival:   Comments:  H426 88 F  Nosebleed >90min  anticoagulated

## 2022-02-07 NOTE — DISCHARGE INSTRUCTIONS
Instructions from your doctor today:  Emergency Department testing is focused on the potential causes of your symptoms that are the most dangerous possibilities, and cannot cover every possibility. Based on the evaluation, it was deemed sufficiently safe to discharge and continue management through the clinics. Thus, follow-up is very important to assess for improvement/worsening, potential further testing, and potential treatment adjustments. If you were given opioid pain medications or other medications that can make you drowsy while in the ED, you should not drive for at least several hours and not until you feel completely back to normal.     Please make an appointment to follow up with:  - [facility] (phone: [phone]) in 2-3 days. They should remove the balloon from your nose at that visit.     - If you do not have a primary care provider, you can be seen in follow-up and establish care by calling any of the clinics below:     - [facility] (phone: [phone])     - Primary Care / [facility] (phone: [phone])   - Have your clinic provider review the results from today's visit with you again, including any potential follow-up or additional testing that may be needed based on the results. Occasionally, incidental findings are found on later review by radiologists that may need follow-up.     Return to the Emergency Department immediately if you have recurrent bleeding symptoms, or any other urgent or potentially life-threatening concerns.

## 2022-02-07 NOTE — ED PROVIDER NOTES
"  History     Chief Complaint   Patient presents with     Epistaxis     HPI  Dimple Oviedo is a 88 year old female with PMH notable for recurrent epistaxis, on rivaroxaban anticoagulation who presents to the ED with nosebleed.  Patient reports that around 11 PM she was bending forward to wrap her legs when she noted the left nostril began to bleed.  She held direct pressure but the bleeding continued for more than an hour, anytime she released the pressure, was adequately controlled while holding it.  She then called EMS.  EMS applied a nose clip.  Besides a mild backache, patient reports feeling otherwise in normal health recently.    I have reviewed the Past Medical History with the patient, pertinent items: Recurrent epistaxis, melanoma, CHF, takes rivaroxaban    Review of Systems  No recent fevers, no chest pain, no abdominal pain    Physical Exam   BP: (!) 136/90  Pulse: 102  Temp: 97.4  F (36.3  C)  Resp: 18  Height: 144.8 cm (4' 9\")  Weight: 76.2 kg (168 lb)  SpO2: 96 %    Physical Exam  General: No acute distress. Appears stated age.   HENT: MMM, no oropharyngeal lesions.  Nose clip initially in place.  With removed: right nostril no bleeding; left nostril brisk bleed without clear source location.  Eyes: PERRL, normal sclerae   Cardio: Regular rate, extremities well perfused  Resp: Normal work of breathing, normal respiratory rate  Neuro: alert and fully oriented. CN II-XII grossly intact. Grossly normal strength and sensation in all extremities.   MSK: no deformities. Grossly normal ROM in extremities.   Integumentary/Skin: no rash, normal color  Psych: normal affect, normal behavior    ED [facility]    -Epistaxis Mgmt    Date/Time: 2/7/2022 3:44 AM  Performed by: Bryant Francis MD  Authorized by: Bryant Francis MD     Risks, benefits and alternatives discussed.      ANESTHESIA (see MAR for exact dosages)     Anesthesia method:  " Topical application  PROCEDURE DETAILS     Treatment site:  L anterior    Treatment method:  Nasal balloon and merocel sponge    Treatment complexity:  Extensive    Treatment episode: recurrence of recent bleed        POST PROCEDURE     Assessment:  Bleeding stopped    PROCEDURE  Describe Procedure: Patient arrived with nose clip in place.  Upon risk of removal bleeding resumed.  Afrin spray and nose clip resumption was attempted without hemostasis.  Merocel sponge soaked with 1% lidocaine with epinephrine was placed, which mostly achieved hemostasis, but with some oozing/dripping still present.  Merisel removed, and 7.5 cm rapid Rhino was placed.  Hemostasis then achieved with 5 mL saline in the balloon.  Patient Tolerance:  Patient tolerated the procedure well with no immediate complications         Critical Care time:  none     Labs Ordered and Resulted from Time of ED Arrival to Time of ED Departure - No data to display  No orders to display          Assessments & Plan (with Medical Decision Making)   Patient presenting with epistaxis, with history notable for rivaroxaban anticoagulation and recurrent epistaxis episodes. Vitals in the ED unremarkable. Nursing notes reviewed.     Epistaxis managed as detailed above without complication.  After placement of the rapid Rhino, there is no further bleeding and no blood visualized in the posterior oropharynx.  Baseline hemoglobin 11.  Patient did not have a blood loss amount that would be significant enough to check hemoglobin.    The complete clinical picture is most consistent with epistaxis. After counseling on the diagnosis, work-up, and treatment plan, the patient was discharged. The patient was advised to follow-up with ENT in 2 to 3 days for rapid Rhino removal and recheck.      Final diagnoses:   Epistaxis     New Prescriptions    No medications on file       --  Bryant Francis MD   Emergency Medicine   [facility] EMERGENCY  DEPARTMENT  2/7/2022     Bryant Francis MD  02/07/22 [id number]

## 2022-02-08 ENCOUNTER — PATIENT OUTREACH (OUTPATIENT)
Dept: OTOLARYNGOLOGY | Facility: CLINIC | Age: 87
End: 2022-02-08
Payer: MEDICARE

## 2022-02-08 ENCOUNTER — PATIENT OUTREACH (OUTPATIENT)
Dept: CARE COORDINATION | Facility: CLINIC | Age: 87
End: 2022-02-08
Payer: MEDICARE

## 2022-02-08 DIAGNOSIS — Z71.89 OTHER SPECIFIED COUNSELING: ICD-10-CM

## 2022-02-08 NOTE — TELEPHONE ENCOUNTER
Called and left message for patient indicating change in provider and time for appointment tomorrow. Advised that patient has been rescheduled to see Dr. Ferguson on 2/9 at 1:00pm for nasal packing removal. Patient was encouraged to call with further questions or concerns.       Mandi Solorzano, RN, BSN

## 2022-02-08 NOTE — PROGRESS NOTES
Clinic Care Coordination Contact  [facility]: Post-Discharge Note  SITUATION                                                      Admission:    Admission Date: 02/07/22   Reason for Admission: Epistaxis  Discharge:   Discharge Date: 02/07/22  Discharge Diagnosis: Epistaxis    BACKGROUND                                                      Per hospital discharge summary and inpatient provider notes:    Dimple Oviedo is a 88 year old female with PMH notable for recurrent epistaxis, on rivaroxaban anticoagulation who presents to the ED with nosebleed.  Patient reports that around 11 PM she was bending forward to wrap her legs when she noted the left nostril began to bleed.  She held direct pressure but the bleeding continued for more than an hour, anytime she released the pressure, was adequately controlled while holding it.  She then called EMS.  EMS applied a nose clip.  Besides a mild backache, patient reports feeling otherwise in normal health recently.     I have reviewed the Past Medical History with the patient, pertinent items: Recurrent epistaxis, melanoma, CHF, takes rivaroxaban       ASSESSMENT      Enrollment  Primary Care Care Coordination Status: Declined    Discharge Assessment  How are you doing now that you are home?: Patient reports she is doing well.  Excited for her appointment tomorrow to get the balloon removed.  A little worried that is will hurt.  No questions for me.  How are your symptoms? (Red Flag symptoms escalate to triage hotline per guidelines): Improved  Do you feel your condition is stable enough to be safe at home until your provider visit?: Yes  Does the patient have their discharge instructions? : Yes  Does the patient have questions regarding their discharge instructions? : No  Were you started on any new medications or were there changes to any of your previous medications? : No  Discharge follow-up appointment scheduled within 14 calendar days? : Yes  Discharge Follow Up  Appointment Date: 02/09/22  Discharge Follow Up Appointment Scheduled with?: Specialty Care Provider                  PLAN                                                      Outpatient Plan:      The patient was advised to follow-up with ENT in 2 to 3 days for rapid Rhino removal and recheck.    Future Appointments   Date Time Provider Department Center   2/9/2022 10:40 AM Maura Hook NP [facility]   2/25/2022 10:20 AM [facility]   5[facility]2022  9:00 AM  [facility]   5/20/2022  9:40 [id number]   5/31/2022 12:00 PM Sd Fnie MD [facility]   5/31/2022  2:30 PM Dhara Meza MD [facility]         For any urgent concerns, please contact our 24 hour nurse triage line: [phone])         ELIZABETH Dodd  [phone]  [facility]

## 2022-02-09 ENCOUNTER — OFFICE VISIT (OUTPATIENT)
Dept: OTOLARYNGOLOGY | Facility: CLINIC | Age: 87
End: 2022-02-09
Attending: EMERGENCY MEDICINE
Payer: MEDICARE

## 2022-02-09 VITALS
HEIGHT: 57 IN | TEMPERATURE: 98 F | WEIGHT: 169 LBS | BODY MASS INDEX: 36.46 KG/M2 | DIASTOLIC BLOOD PRESSURE: 57 MMHG | SYSTOLIC BLOOD PRESSURE: 112 MMHG | OXYGEN SATURATION: 95 % | HEART RATE: 96 BPM

## 2022-02-09 DIAGNOSIS — N18.30 STAGE 3 CHRONIC KIDNEY DISEASE, UNSPECIFIED WHETHER STAGE 3A OR 3B CKD (H): ICD-10-CM

## 2022-02-09 DIAGNOSIS — J45.909 UNCOMPLICATED ASTHMA, UNSPECIFIED ASTHMA SEVERITY, UNSPECIFIED WHETHER PERSISTENT: Primary | ICD-10-CM

## 2022-02-09 DIAGNOSIS — I48.0 PAROXYSMAL ATRIAL FIBRILLATION (H): ICD-10-CM

## 2022-02-09 DIAGNOSIS — I72.2 ANEURYSM OF RENAL ARTERY (H): ICD-10-CM

## 2022-02-09 DIAGNOSIS — E66.01 MORBID OBESITY (H): ICD-10-CM

## 2022-02-09 DIAGNOSIS — M35.3 POLYMYALGIA RHEUMATICA (H): ICD-10-CM

## 2022-02-09 DIAGNOSIS — C66.1 UROTHELIAL CARCINOMA OF RIGHT DISTAL URETER (H): ICD-10-CM

## 2022-02-09 DIAGNOSIS — R04.0 EPISTAXIS: ICD-10-CM

## 2022-02-09 DIAGNOSIS — I50.22 CHRONIC SYSTOLIC HEART FAILURE (H): ICD-10-CM

## 2022-02-09 PROCEDURE — 99213 OFFICE O/P EST LOW 20 MIN: CPT | Performed by: OTOLARYNGOLOGY

## 2022-02-09 ASSESSMENT — PAIN SCALES - GENERAL: PAINLEVEL: NO PAIN (0)

## 2022-02-09 ASSESSMENT — MIFFLIN-ST. JEOR: SCORE: 1070.46

## 2022-02-09 NOTE — PATIENT INSTRUCTIONS
1. You have packing in your nose that will dissolve on its own. You can use the afrin spray given to you for 2-3 days. Do not use this longer than 3 days.   2. Please call the ENT clinic with any questions,concerns, new or worsening symptoms.    -Clinic number is [phone]   - Mandi's direct line (Dr. Ferguson's nurse) [phone]

## 2022-02-09 NOTE — PROGRESS NOTES
HISTORY OF PRESENT ILLNESS:  Ms. Oviedo is a patient who has a chronic nosebleeds and received nasal packing in the ER three days ago.  She is here for packing removal.  She reports that she is on Xarelto and has had this problem in the past and she is wondering if she can stop her Xarelto.  She has not noticed any bleeding since the packing started.  She has had no numbness in her face and no vision changes.            Past Medical History:   Diagnosis Date     Arthritis      Asthma 2/9/2022     Calculus of kidney      Chemotherapy-induced neutropenia (H) 3/6/2019     Congestive heart failure (H)      Esophageal reflux      GERD (gastroesophageal reflux disease)      Hyperlipidemia LDL goal <130 5/9/2010     Malignant melanoma of skin of trunk, except scrotum (H)      Nonspecific abnormal finding     has living will 2004 -      Nontoxic multinodular goiter     no further eval /tx rec per pt     Osteopenia      Other psoriasis      Personal history of colonic polyps      PMR (polymyalgia rheumatica) (H)      Restless legs syndrome 7/11/2018     Stress-induced cardiomyopathy      Undiagnosed cardiac murmurs      Unspecified constipation      Unspecified essential hypertension      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     Past Surgical History:   Procedure Laterality Date     ARTHROPLASTY KNEE Right 11/9/2020    Procedure: ARTHROPLASTY, KNEE, TOTAL, RIGHT;  Surgeon: Edward Otero MD;  [facility]     BIOPSY       COLONOSCOPY  2014     COMBINED CYSTOSCOPY, INSERT STENT URETER(S) Bilateral 9/12/2018    Procedure: COMBINED CYSTOSCOPY, INSERT STENT URETER(S);  Cystoscopy Bilateral ureteral Stent Placement.;  Surgeon: Justin Henry MD;  [facility]     COMBINED CYSTOSCOPY, RETROGRADES, URETEROSCOPY, INSERT STENT Bilateral 4/3/2018    Procedure: COMBINED CYSTOSCOPY, RETROGRADES, URETEROSCOPY, INSERT STENT;;  Surgeon: Stuart King MD;  [facility]     COMBINED CYSTOSCOPY, RETROGRADES,  URETEROSCOPY, INSERT STENT Bilateral 9/10/2018    Procedure: COMBINED CYSTOSCOPY, RETROGRADES, URETEROSCOPY, INSERT STENT;  Cystoscopy, Bilateral Ureteroscopy, Bladder Biopsies, Retrogram Pyelograms, Ureteral Washings and brushings, cysview;  Surgeon: Stuart King MD;  [facility]     COMBINED CYSTOSCOPY, RETROGRADES, URETEROSCOPY, INSERT STENT Left 8/26/2020    Procedure: Cystoscopy, Left Ureteral Wash, Left ureteral Retrograde Pyelogram;  Surgeon: Justin Henry MD;  [facility]     CYSTOSCOPY, BIOPSY BLADDER INSTILL OPTICAL AGENT N/A 4/3/2018    Procedure: CYSTOSCOPY, BIOPSY BLADDER INSTILL OPTICAL AGENT;  Cystoscopy, Blue Light Cystoscopy, Bladder Biopsies, Bilateral Selective ureteral washings for Cytology, Bilateral Retrograde Pyelograms, Bilateral Ureteroscopy;  Surgeon: Stuart King MD;  [facility]     CYSTOSCOPY, BIOPSY BLADDER, COMBINED N/A 2/19/2018    Procedure: COMBINED CYSTOSCOPY, BIOPSY BLADDER;  Cystoscopy, Bladder Biopsy;  Surgeon: Kenna La MD;  [facility]     CYSTOSCOPY, BIOPSY BLADDER, COMBINED N/A 8/26/2020    Procedure: Cystoscopy, biopsy bladder, combined;  Surgeon: Justin Henry MD;  [facility]     CYSTOSCOPY, FULGURATE BLADDER TUMOR, COMBINED N/A 1/13/2020    Procedure: CYSTOSCOPY, WITH  bladder biopsies and fulguration;  Surgeon: Stuart King MD;  [facility]     CYSTOSCOPY, REMOVE STENT(S), COMBINED  11/13/2018    Procedure: Flexible Cystoscopy with Stent Removal;  Surgeon: Stuart King MD;  [facility]     DAVINCI LYMPHADENECTOMY N/A 11/13/2018    Procedure: Davinci Lymphadenectomy ;  Surgeon: Stuart King MD;  [facility]     DAVINCI NEPHROURETERECTOMY N/A 11/13/2018    Procedure: Right DaVinci Assisted Nephroureterectomy;  Surgeon: Stuart King MD;  [facility]     ENDOSCOPIC ULTRASOUND LOWER GASTROINTESTIONAL TRACT (GI) N/A 10/30/2015     Procedure: ENDOSCOPIC ULTRASOUND LOWER GASTROINTESTIONAL TRACT (GI);  Surgeon: Daniel Jean-Baptiste MD;  [facility]     EYE SURGERY  12/4/17     INSERT PORT VASCULAR ACCESS Right 12/19/2018    Procedure: Chest Port Placement - right;  Surgeon: Stuart Chavez PA-C;  [facility]     IR CHEST PORT PLACEMENT > 5 YRS OF AGE  12/19/2018     IR PORT REMOVAL RIGHT  6/26/2019     LAPAROSCOPIC CHOLECYSTECTOMY WITH CHOLANGIOGRAMS N/A 11/1/2015    Procedure: LAPAROSCOPIC CHOLECYSTECTOMY WITH CHOLANGIOGRAMS;  Surgeon: Tonie Warren MD;  [facility]     REMOVE PORT VASCULAR ACCESS Right 6/26/2019    Procedure: Right Port Removal;  Surgeon: Froilan Irizarry PA-C;  [facility]     SURGICAL HISTORY OF -   1996    malignant melanoma     SURGICAL HISTORY OF -   1968    thyroid nodule     SURGICAL HISTORY OF -       D & C     Z NONSPECIFIC PROCEDURE  2005    colonoscopy polyp repeat 2010       Current Outpatient Medications:      alendronate (FOSAMAX) 70 MG tablet, TAKE 1 TABLET EVERY 7 DAYS AT LEAST 60 MINUTES BEFORE BREAKFAST AS DIRECTED., Disp: 12 tablet, Rfl: 3     amoxicillin (AMOXIL) 500 MG tablet, Take 4 tablets (2000 mg) by mouth 1 hour before dental procedures/cleanings., Disp: 4 tablet, Rfl: 3     bisacodyl (DULCOLAX) 5 MG EC tablet, Take 2 tablets by mouth at bedtime two days prior to procedure.  Take 2 tabs by mouth at 3pm one day prior to procedure., Disp: 4 tablet, Rfl: 0     diltiazem ER (DILT-XR) 180 MG 24 hr capsule, Take 1 capsule (180 mg) by mouth daily, Disp: 90 capsule, Rfl: 0     ferrous sulfate 325 (65 Fe) MG TBEC EC tablet, Take 325 mg by mouth every morning , Disp: , Rfl:      furosemide (LASIX) 20 MG tablet, Take 1 tablet (20 mg) by mouth 2 times daily .  Take in the morning and after lunch., Disp: 90 tablet, Rfl: 1     irbesartan (AVAPRO) 300 MG tablet, TAKE 1 TABLET EVERY DAY, Disp: 90 tablet, Rfl: 2     lovastatin (MEVACOR) 40 MG tablet, Take 1 tablet (40 mg) by  mouth At Bedtime, Disp: 90 tablet, Rfl: 3     methimazole (TAPAZOLE) 5 MG tablet, Take 5 mg alternating with 2.5 mg every other day, Disp: 90 tablet, Rfl: 1     Omega-3 Fatty Acids (FISH OIL) 500 MG CAPS, Take 1 capsule by mouth every morning , Disp: , Rfl:      omeprazole (PRILOSEC) 20 MG DR capsule, TAKE 1 CAPSULE EVERY DAY, Disp: 90 capsule, Rfl: 0     ondansetron (ZOFRAN-ODT) 4 MG ODT tab, Take 1 tablet (4 mg) by mouth every 6 hours as needed for nausea or vomiting, Disp: 20 tablet, Rfl: 0     propranolol ER (INDERAL LA) 60 MG 24 hr capsule, TAKE 1 CAPSULE EVERY DAY, Disp: 90 capsule, Rfl: 3     rivaroxaban ANTICOAGULANT (XARELTO) 15 MG TABS tablet, Take 1 tablet (15 mg) by mouth daily (with dinner), Disp: 90 tablet, Rfl: 0     rOPINIRole (REQUIP) 0.25 MG tablet, TAKE 1 TABLET IN THE AFTERNOON AND TAKE 2 TABLETS EVERY NIGHT, Disp: 270 tablet, Rfl: 3     sertraline (ZOLOFT) 100 MG tablet, TAKE 1 TABLET EVERY MORNING, Disp: 90 tablet, Rfl: 3     traZODone (DESYREL) 50 MG tablet, TAKE 1/2 TABLET AT BEDTIME, Disp: 45 tablet, Rfl: 3    Current Facility-Administered Medications:      lidocaine (XYLOCAINE) 2 % external gel, , Urethral, Once, Justin Henry MD  Allergies   Allergen Reactions     Codeine      Social History     Tobacco Use     Smoking status: Never Smoker     Smokeless tobacco: Never Used   Substance Use Topics     Alcohol use: No     Alcohol/week: 0.0 standard drinks     Comment: rare     Review Of Systems  Skin: negative  Eyes: negative  Ears/Nose/Throat: negative  Respiratory: No shortness of breath, dyspnea on exertion, cough, or hemoptysis  Cardiovascular: negative  Gastrointestinal: negative  Genitourinary: negative  Musculoskeletal: negative  Neurologic: negative  Psychiatric: negative  Hematologic/Lymphatic/Immunologic: negative  Endocrine: negative      PHYSICAL EXAMINATION:  On examination, she is well appearing, in no distress.  She had a balloon nasal packing in place with saline.  I  removed the saline and irrigated around the balloon and also sprayed with oxymetazoline and lidocaine.  I then removed the balloon and the patient tolerated this fairly well.  Immediately, I could see some oozing from the floor of the nose.  I cauterized this with silver nitrate and placed some Surgicel.  I watched her for a few minutes and there continues to be a small amount of oozing in the oropharynx.  I therefore removed the Surgicel and did a rigid endoscopy that again showed some oozing floor of the nose.  I used a smaller piece of Surgicel this time to pack that area and covered it up and the nasopharynx and oropharynx were clear after a few checks.  I felt like the bleeding was controlled.    IMPRESSION:  Status post nasal packing removal.    PLAN:    1.  I encouraged her to use Afrin for the next two to three days, but to stop it after that.  2.  The Surgicel that is in there is quite small and will dissolve on its own and I told her to expect some brownish nasal drainage as that happens.  She will follow up with her cardiologist regarding her need for Xarelto.

## 2022-02-09 NOTE — NURSING NOTE
"Chief Complaint   Patient presents with     RECHECK     Rhino rocket removal      Blood pressure 112/57, pulse 96, temperature 98  F (36.7  C), height 1.448 m (4' 9\"), weight 76.7 kg (169 lb), SpO2 95 %, not currently breastfeeding.    Christopher Vale LPN    "

## 2022-02-09 NOTE — LETTER
2/9/2022       RE: Dimple Oviedo  [address]  [facility] [address]     Dear Colleague,    Thank you for referring your patient, Dimple Oviedo, to the [facility] [place] [facility]. Please see a copy of my visit note below.    HISTORY OF PRESENT ILLNESS:  Ms. Oviedo is a patient who has a chronic nosebleeds and received nasal packing in the ER three days ago.  She is here for packing removal.  She reports that she is on Xarelto and has had this problem in the past and she is wondering if she can stop her Xarelto.  She has not noticed any bleeding since the packing started.  She has had no numbness in her face and no vision changes.            Past Medical History:   Diagnosis Date     Arthritis      Asthma 2/9/2022     Calculus of kidney      Chemotherapy-induced neutropenia (H) 3/6/2019     Congestive heart failure (H)      Esophageal reflux      GERD (gastroesophageal reflux disease)      Hyperlipidemia LDL goal <130 5/9/2010     Malignant melanoma of skin of trunk, except scrotum (H)      Nonspecific abnormal finding     has living will 2004 -      Nontoxic multinodular goiter     no further eval /tx rec per pt     Osteopenia      Other psoriasis      Personal history of colonic polyps      PMR (polymyalgia rheumatica) (H)      Restless legs syndrome 7/11/2018     Stress-induced cardiomyopathy      Undiagnosed cardiac murmurs      Unspecified constipation      Unspecified essential hypertension      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     Past Surgical History:   Procedure Laterality Date     ARTHROPLASTY KNEE Right 11/9/2020    Procedure: ARTHROPLASTY, KNEE, TOTAL, RIGHT;  Surgeon: Edward Otero MD;  [facility]     BIOPSY       COLONOSCOPY  2014     COMBINED CYSTOSCOPY, INSERT STENT URETER(S) Bilateral 9/12/2018    Procedure: COMBINED CYSTOSCOPY, INSERT STENT URETER(S);  Cystoscopy Bilateral  ureteral Stent Placement.;  Surgeon: Justin Henry MD;  [facility]     COMBINED CYSTOSCOPY, RETROGRADES, URETEROSCOPY, INSERT STENT Bilateral 4/3/2018    Procedure: COMBINED CYSTOSCOPY, RETROGRADES, URETEROSCOPY, INSERT STENT;;  Surgeon: Stuart King MD;  [facility]     COMBINED CYSTOSCOPY, RETROGRADES, URETEROSCOPY, INSERT STENT Bilateral 9/10/2018    Procedure: COMBINED CYSTOSCOPY, RETROGRADES, URETEROSCOPY, INSERT STENT;  Cystoscopy, Bilateral Ureteroscopy, Bladder Biopsies, Retrogram Pyelograms, Ureteral Washings and brushings, cysview;  Surgeon: Stuart King MD;  [facility]     COMBINED CYSTOSCOPY, RETROGRADES, URETEROSCOPY, INSERT STENT Left 8/26/2020    Procedure: Cystoscopy, Left Ureteral Wash, Left ureteral Retrograde Pyelogram;  Surgeon: Justin Henry MD;  [facility]     CYSTOSCOPY, BIOPSY BLADDER INSTILL OPTICAL AGENT N/A 4/3/2018    Procedure: CYSTOSCOPY, BIOPSY BLADDER INSTILL OPTICAL AGENT;  Cystoscopy, Blue Light Cystoscopy, Bladder Biopsies, Bilateral Selective ureteral washings for Cytology, Bilateral Retrograde Pyelograms, Bilateral Ureteroscopy;  Surgeon: Stuart King MD;  [facility]     CYSTOSCOPY, BIOPSY BLADDER, COMBINED N/A 2/19/2018    Procedure: COMBINED CYSTOSCOPY, BIOPSY BLADDER;  Cystoscopy, Bladder Biopsy;  Surgeon: Kenna La MD;  [facility]     CYSTOSCOPY, BIOPSY BLADDER, COMBINED N/A 8/26/2020    Procedure: Cystoscopy, biopsy bladder, combined;  Surgeon: Justin Henry MD;  [facility]     CYSTOSCOPY, FULGURATE BLADDER TUMOR, COMBINED N/A 1/13/2020    Procedure: CYSTOSCOPY, WITH  bladder biopsies and fulguration;  Surgeon: Stuart King MD;  [facility]     CYSTOSCOPY, REMOVE STENT(S), COMBINED  11/13/2018    Procedure: Flexible Cystoscopy with Stent Removal;  Surgeon: Stuart King MD;  [facility]     DAVINCI LYMPHADENECTOMY N/A 11/13/2018     Procedure: Davinci Lymphadenectomy ;  Surgeon: Stuart King MD;  [facility]     DAVINCI NEPHROURETERECTOMY N/A 11/13/2018    Procedure: Right DaVinci Assisted Nephroureterectomy;  Surgeon: Stuart King MD;  [facility]     ENDOSCOPIC ULTRASOUND LOWER GASTROINTESTIONAL TRACT (GI) N/A 10/30/2015    Procedure: ENDOSCOPIC ULTRASOUND LOWER GASTROINTESTIONAL TRACT (GI);  Surgeon: Daniel Jean-Baptiste MD;  [facility]     EYE SURGERY  12/4/17     INSERT PORT VASCULAR ACCESS Right 12/19/2018    Procedure: Chest Port Placement - right;  Surgeon: Stuart Chavez PA-C;  [facility]     IR CHEST PORT PLACEMENT > 5 YRS OF AGE  12/19/2018     IR PORT REMOVAL RIGHT  6/26/2019     LAPAROSCOPIC CHOLECYSTECTOMY WITH CHOLANGIOGRAMS N/A 11/1/2015    Procedure: LAPAROSCOPIC CHOLECYSTECTOMY WITH CHOLANGIOGRAMS;  Surgeon: Tonie Warren MD;  [facility]     REMOVE PORT VASCULAR ACCESS Right 6/26/2019    Procedure: Right Port Removal;  Surgeon: Froilan Irizarry PA-C;  [facility]     SURGICAL HISTORY OF -   1996    malignant melanoma     SURGICAL HISTORY OF -   1968    thyroid nodule     SURGICAL HISTORY OF -       D & C     ZZC NONSPECIFIC PROCEDURE  2005    colonoscopy polyp repeat 2010       Current Outpatient Medications:      alendronate (FOSAMAX) 70 MG tablet, TAKE 1 TABLET EVERY 7 DAYS AT LEAST 60 MINUTES BEFORE BREAKFAST AS DIRECTED., Disp: 12 tablet, Rfl: 3     amoxicillin (AMOXIL) 500 MG tablet, Take 4 tablets (2000 mg) by mouth 1 hour before dental procedures/cleanings., Disp: 4 tablet, Rfl: 3     bisacodyl (DULCOLAX) 5 MG EC tablet, Take 2 tablets by mouth at bedtime two days prior to procedure.  Take 2 tabs by mouth at 3pm one day prior to procedure., Disp: 4 tablet, Rfl: 0     diltiazem ER (DILT-XR) 180 MG 24 hr capsule, Take 1 capsule (180 mg) by mouth daily, Disp: 90 capsule, Rfl: 0     ferrous sulfate 325 (65 Fe) MG TBEC EC tablet, Take 325 mg by  mouth every morning , Disp: , Rfl:      furosemide (LASIX) 20 MG tablet, Take 1 tablet (20 mg) by mouth 2 times daily .  Take in the morning and after lunch., Disp: 90 tablet, Rfl: 1     irbesartan (AVAPRO) 300 MG tablet, TAKE 1 TABLET EVERY DAY, Disp: 90 tablet, Rfl: 2     lovastatin (MEVACOR) 40 MG tablet, Take 1 tablet (40 mg) by mouth At Bedtime, Disp: 90 tablet, Rfl: 3     methimazole (TAPAZOLE) 5 MG tablet, Take 5 mg alternating with 2.5 mg every other day, Disp: 90 tablet, Rfl: 1     Omega-3 Fatty Acids (FISH OIL) 500 MG CAPS, Take 1 capsule by mouth every morning , Disp: , Rfl:      omeprazole (PRILOSEC) 20 MG DR capsule, TAKE 1 CAPSULE EVERY DAY, Disp: 90 capsule, Rfl: 0     ondansetron (ZOFRAN-ODT) 4 MG ODT tab, Take 1 tablet (4 mg) by mouth every 6 hours as needed for nausea or vomiting, Disp: 20 tablet, Rfl: 0     propranolol ER (INDERAL LA) 60 MG 24 hr capsule, TAKE 1 CAPSULE EVERY DAY, Disp: 90 capsule, Rfl: 3     rivaroxaban ANTICOAGULANT (XARELTO) 15 MG TABS tablet, Take 1 tablet (15 mg) by mouth daily (with dinner), Disp: 90 tablet, Rfl: 0     rOPINIRole (REQUIP) 0.25 MG tablet, TAKE 1 TABLET IN THE AFTERNOON AND TAKE 2 TABLETS EVERY NIGHT, Disp: 270 tablet, Rfl: 3     sertraline (ZOLOFT) 100 MG tablet, TAKE 1 TABLET EVERY MORNING, Disp: 90 tablet, Rfl: 3     traZODone (DESYREL) 50 MG tablet, TAKE 1/2 TABLET AT BEDTIME, Disp: 45 tablet, Rfl: 3    Current Facility-Administered Medications:      lidocaine (XYLOCAINE) 2 % external gel, , Urethral, Once, Justin Henry MD  Allergies   Allergen Reactions     Codeine      Social History     Tobacco Use     Smoking status: Never Smoker     Smokeless tobacco: Never Used   Substance Use Topics     Alcohol use: No     Alcohol/week: 0.0 standard drinks     Comment: rare     Review Of Systems  Skin: negative  Eyes: negative  Ears/Nose/Throat: negative  Respiratory: No shortness of breath, dyspnea on exertion, cough, or hemoptysis  Cardiovascular:  negative  Gastrointestinal: negative  Genitourinary: negative  Musculoskeletal: negative  Neurologic: negative  Psychiatric: negative  Hematologic/Lymphatic/Immunologic: negative  Endocrine: negative      PHYSICAL EXAMINATION:  On examination, she is well appearing, in no distress.  She had a balloon nasal packing in place with saline.  I removed the saline and irrigated around the balloon and also sprayed with oxymetazoline and lidocaine.  I then removed the balloon and the patient tolerated this fairly well.  Immediately, I could see some oozing from the floor of the nose.  I cauterized this with silver nitrate and placed some Surgicel.  I watched her for a few minutes and there continues to be a small amount of oozing in the oropharynx.  I therefore removed the Surgicel and did a rigid endoscopy that again showed some oozing floor of the nose.  I used a smaller piece of Surgicel this time to pack that area and covered it up and the nasopharynx and oropharynx were clear after a few checks.  I felt like the bleeding was controlled.    IMPRESSION:  Status post nasal packing removal.    PLAN:    1.  I encouraged her to use Afrin for the next two to three days, but to stop it after that.  2.  The Surgicel that is in there is quite small and will dissolve on its own and I told her to expect some brownish nasal drainage as that happens.  She will follow up with her cardiologist regarding her need for Xarelto.        Again, thank you for allowing me to participate in the care of your patient.      Sincerely,    Hany Ferguson MD

## 2022-02-18 ENCOUNTER — PRE VISIT (OUTPATIENT)
Dept: UROLOGY | Facility: CLINIC | Age: 87
End: 2022-02-18

## 2022-02-18 NOTE — TELEPHONE ENCOUNTER
Reason for visit: follow up cysto     Relevant information: bladder cancer    Records/imaging/labs/orders: in epic    Pt called: no    At Rooming: normal

## 2022-02-25 ENCOUNTER — OFFICE VISIT (OUTPATIENT)
Dept: UROLOGY | Facility: CLINIC | Age: 87
End: 2022-02-25
Payer: MEDICARE

## 2022-02-25 VITALS — HEART RATE: 86 BPM | SYSTOLIC BLOOD PRESSURE: 131 MMHG | DIASTOLIC BLOOD PRESSURE: 83 MMHG

## 2022-02-25 DIAGNOSIS — C68.9 UROTHELIAL CANCER (H): Primary | ICD-10-CM

## 2022-02-25 DIAGNOSIS — C66.1 UROTHELIAL CARCINOMA OF RIGHT DISTAL URETER (H): ICD-10-CM

## 2022-02-25 DIAGNOSIS — E05.00 GRAVES DISEASE: ICD-10-CM

## 2022-02-25 PROCEDURE — 52000 CYSTOURETHROSCOPY: CPT | Performed by: UROLOGY

## 2022-02-25 PROCEDURE — 88120 CYTP URNE 3-5 PROBES EA SPEC: CPT | Mod: TC | Performed by: INTERNAL MEDICINE

## 2022-02-25 PROCEDURE — 88112 CYTOPATH CELL ENHANCE TECH: CPT | Mod: 26 | Performed by: PATHOLOGY

## 2022-02-25 PROCEDURE — 88112 CYTOPATH CELL ENHANCE TECH: CPT | Mod: TC | Performed by: INTERNAL MEDICINE

## 2022-02-25 PROCEDURE — 88120 CYTP URNE 3-5 PROBES EA SPEC: CPT | Mod: 26 | Performed by: PATHOLOGY

## 2022-02-25 ASSESSMENT — PAIN SCALES - GENERAL: PAINLEVEL: NO PAIN (0)

## 2022-02-25 NOTE — PATIENT INSTRUCTIONS
Please follow up in three months for a cystoscopy.    It was a pleasure meeting with you today.  Thank you for allowing me and my team the privilege of caring for you today.  YOU are the reason we are here, and I truly hope we provided you with the excellent service you deserve.  Please let us know if there is anything else we can do for you so that we can be sure you are leaving completely satisfied with your care experience.

## 2022-02-25 NOTE — NURSING NOTE
Chief Complaint   Patient presents with     Cystoscopy       Blood pressure 131/83, pulse 86, not currently breastfeeding. There is no height or weight on file to calculate BMI.    Patient Active Problem List   Diagnosis     Esophageal reflux     Restless leg syndrome     heat intolerance     Goiter     Disorder of bone and cartilage     Other psoriasis     perirectal cyst     Malignant melanoma of skin of trunk, except scrotum (H)     Nontoxic multinodular goiter     Hyperlipidemia LDL goal <130     Hypertension goal BP (blood pressure) < 140/90     Urinary incontinence     Hip arthritis     Polymyalgia rheumatica (H)     High risk medication use     Shoulder pain     Impaired fasting blood sugar     Chronic bilateral low back pain without sciatica     Obesity, unspecified obesity severity, unspecified obesity type     Iron deficiency anemia, unspecified iron deficiency anemia type     NSTEMI (non-ST elevated myocardial infarction) (H)     Stress-induced cardiomyopathy     A-fib (H)     Anxiety     Chronic systolic heart failure (H)     Urothelial carcinoma (H)     Hyperthyroidism     CRESENCIO (acute kidney injury) (H)     Hydronephrosis     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)     Graves disease     Encounter for antineoplastic chemotherapy     Primary localized osteoarthritis of right knee     Urothelial cancer (H)     Malignant neoplasm of overlapping sites of bladder (H)     Primary osteoarthritis of right knee     Chronic kidney disease, stage 3 (H)     Lipedematous scalp     Atrial fibrillation with rapid ventricular response (H)     Morbid obesity (H)     Aneurysm of renal artery (H)     Osteopenia, unspecified location     Asthma       Allergies   Allergen Reactions     Codeine        Current Outpatient Medications   Medication Sig Dispense Refill     alendronate (FOSAMAX) 70 MG tablet TAKE 1 TABLET EVERY 7 DAYS AT LEAST 60 MINUTES BEFORE BREAKFAST AS DIRECTED. 12 tablet 3     amoxicillin (AMOXIL) 500  MG tablet Take 4 tablets (2000 mg) by mouth 1 hour before dental procedures/cleanings. 4 tablet 3     bisacodyl (DULCOLAX) 5 MG EC tablet Take 2 tablets by mouth at bedtime two days prior to procedure.  Take 2 tabs by mouth at 3pm one day prior to procedure. 4 tablet 0     diltiazem ER (DILT-XR) 180 MG 24 hr capsule Take 1 capsule (180 mg) by mouth daily 90 capsule 0     ferrous sulfate 325 (65 Fe) MG TBEC EC tablet Take 325 mg by mouth every morning        furosemide (LASIX) 20 MG tablet Take 1 tablet (20 mg) by mouth 2 times daily .  Take in the morning and after lunch. 90 tablet 1     irbesartan (AVAPRO) 300 MG tablet TAKE 1 TABLET EVERY DAY 90 tablet 2     lovastatin (MEVACOR) 40 MG tablet Take 1 tablet (40 mg) by mouth At Bedtime 90 tablet 3     methimazole (TAPAZOLE) 5 MG tablet Take 5 mg alternating with 2.5 mg every other day 90 tablet 1     Omega-3 Fatty Acids (FISH OIL) 500 MG CAPS Take 1 capsule by mouth every morning        omeprazole (PRILOSEC) 20 MG DR capsule TAKE 1 CAPSULE EVERY DAY 90 capsule 0     ondansetron (ZOFRAN-ODT) 4 MG ODT tab Take 1 tablet (4 mg) by mouth every 6 hours as needed for nausea or vomiting 20 tablet 0     propranolol ER (INDERAL LA) 60 MG 24 hr capsule TAKE 1 CAPSULE EVERY DAY 90 capsule 3     rivaroxaban ANTICOAGULANT (XARELTO) 10 MG TABS tablet Take 1 tablet (10 mg) by mouth daily (with dinner) 90 tablet 1     rOPINIRole (REQUIP) 0.25 MG tablet TAKE 1 TABLET IN THE AFTERNOON AND TAKE 2 TABLETS EVERY NIGHT 270 tablet 3     sertraline (ZOLOFT) 100 MG tablet TAKE 1 TABLET EVERY MORNING 90 tablet 3     traZODone (DESYREL) 50 MG tablet TAKE 1/2 TABLET AT BEDTIME 45 tablet 3       Social History     Tobacco Use     Smoking status: Never Smoker     Smokeless tobacco: Never Used   Vaping Use     Vaping Use: Never used   Substance Use Topics     Alcohol use: No     Alcohol/week: 0.0 standard drinks     Comment: rare     Drug use: No       Invasive Procedure Safety  Checklist:    Procedure: Cystoscopy    Action: Complete sections and checkboxes as appropriate.    Pre-procedure:  1. Patient ID Verified with 2 identifiers (Bettie and  or MRN) : YES    2. Procedure and site verified with patient/designee (when able) : YES    3. Accurate consent documentation in medical record : YES    4. H&P (or appropriate assessment) documented in medical record : N/A  H&P must be up to 30 days prior to procedure an updated within 24 hours of                 Procedure as applicable.     5. Relevant diagnostic and radiology test results appropriately labeled and displayed as applicable : YES    6. Blood products, implants, devices, and/or special equipment available for the procedure as applicable : YES    7. Procedure site(s) marked with provider initials [Exclusions: none] : NO    8. Marking not required. Reason : Yes  Procedure does not require site marking    Time Out:     Time-Out performed immediately prior to starting procedure, including verbal and active participation of all team members addressing: YES    1. Correct patient identity.  2. Confirmed that the correct side and site are marked.  3. An accurate procedure to be done.  4. Agreement on the procedure to be done.  5. Correct patient position.  6. Relevant images and results are properly labeled and appropriately displayed.  7. The need to administer antibiotics or fluids for irrigation purposes during the procedure as applicable.  8. Safety precautions based on patient history or medication use.    During Procedure: Verification of correct person, site, and procedure occurs any time the responsibility for care of the patient is transferred to another member of the care team.        Taco Mabry EMT-P  2022  11:11 AM

## 2022-02-25 NOTE — PROGRESS NOTES
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 She completed chemotherapy (gem/carbo).     -Continue q3-4 month CT Chest Abdomen and Pelvis with and without contrast with Dr Fine.     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   Got 6 of BCG and tolerated it well.   Negative biopsies 4/2020, 8/26/20.    Plan for 3 weekly maintenance BCG treatments at 3, 6, 12, 18, 24, 30, and 36 months.    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 The patient is following yolanda Toledo of Hematology and Oncology.      Per Dr. Toledo's note, on 12/12/18 Mr. Oviedo- Started adjuvant chemotherapy (carbo+gem) per POUT trial. Cycle 2 - 1/2/19. Cycle 3 - 1/23/19. Cycle 4 - 02/13/19.    Cystoscopy on 10/03/2019 showed small area of erythema. Bladder biopsy from 01/13/2020 showed urothelial carcinoma in-situ , negative biopsy 5/2020    Intravesical Therapy: BCG X6 Feb 2020 (Full Strength)     She had right total knee arthoplasty 02/17/2020    Dr Fine is doing follow-up as well.    Today she is doing well.      CYSTOSCOPY PROCEDURE:  Sterile preparation and drape and an informed consent were performed.  A 16-Polish flexible cystoscope was introduced via the urethra.  The urethra was open.  The bladder mucosa showed no evidence of any lesions or trabeculation.  There were no stones.  Urine aspirated for cytology

## 2022-02-25 NOTE — LETTER
2/25/2022       RE: Dimple Oviedo  [address]  [facility] [address]     Dear Colleague,    Thank you for referring your patient, Dimple Oviedo, to the [facility] [place] [facility]. Please see a copy of my visit note below.    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 She completed chemotherapy (gem/carbo).     -Continue q3-4 month CT Chest Abdomen and Pelvis with and without contrast with Dr Fine.     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   Got 6 of BCG and tolerated it well.   Negative biopsies 4/2020, 8/26/20.    Plan for 3 weekly maintenance BCG treatments at 3, 6, 12, 18, 24, 30, and 36 months.    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 The patient is following yolanda Toledo of Hematology and Oncology.      Per Dr. Toledo's note, on 12/12/18 Mr. Oviedo- Started adjuvant chemotherapy (carbo+gem) per POUT trial. Cycle 2 - 1/2/19. Cycle 3 - 1/23/19. Cycle 4 - 02/13/19.    Cystoscopy on 10/03/2019 showed small area of erythema. Bladder biopsy from 01/13/2020 showed urothelial carcinoma in-situ , negative biopsy 5/2020    Intravesical Therapy: BCG X6 Feb 2020 (Full Strength)     She had right total knee arthoplasty 02/17/2020    Dr Fine is doing follow-up as well.    Today she is doing well.      CYSTOSCOPY PROCEDURE:  Sterile preparation and drape and an informed consent were performed.  A 16-[place]  flexible cystoscope was introduced via the urethra.  The urethra was open.  The bladder mucosa showed no evidence of any lesions or trabeculation.  There were no stones.  Urine aspirated for cytology       Justin Henry MD

## 2022-03-09 DIAGNOSIS — M85.80 OSTEOPENIA, UNSPECIFIED LOCATION: ICD-10-CM

## 2022-03-09 RX ORDER — ALENDRONATE SODIUM 70 MG/1
TABLET ORAL
Qty: 12 TABLET | Refills: 2 | Status: SHIPPED | OUTPATIENT
Start: 2022-03-09 | End: 2023-01-19

## 2022-03-09 NOTE — TELEPHONE ENCOUNTER
Duplicate see script sent   alendronate (FOSAMAX) 70 MG tablet (Discontinued) 12 tablet 3 1/24/2022

## 2022-03-12 DIAGNOSIS — I87.303 STASIS EDEMA OF BOTH LOWER EXTREMITIES: ICD-10-CM

## 2022-03-14 ENCOUNTER — TELEPHONE (OUTPATIENT)
Dept: FAMILY MEDICINE | Facility: CLINIC | Age: 87
End: 2022-03-14
Payer: MEDICARE

## 2022-03-14 NOTE — TELEPHONE ENCOUNTER
Reason for Call:  Other prescription    Detailed comments: patient called and is currently taking medication Furosomide 20 MG once a day per [place] at  [facility].  Patient states [facility] provider Dr Ji wants her to increase dosage to same medication 2 times a day instead.  Would like refill [facility] [employer].  Please contact patient.  Thank you.    Phone Number Patient can be reached at: Home number on file [phone] (home)    Best Time: any    Can we leave a detailed message on this number? YES    Call taken on 3/14/2022 at 1:24 PM by Marce Killian

## 2022-03-15 RX ORDER — FUROSEMIDE 20 MG
20 TABLET ORAL 2 TIMES DAILY
Qty: 90 TABLET | Refills: 1 | Status: SHIPPED | OUTPATIENT
Start: 2022-03-15 | End: 2022-05-24

## 2022-03-15 NOTE — TELEPHONE ENCOUNTER
Creatinine   Date Value Ref Range Status   12/09/2021 0.98 0.52 - 1.04 mg/dL Final   05/11/2021 1.04 0.52 - 1.04 mg/dL Final     Prescription approved per [facility] Refill Protocol.  SHASHANK MurrellN, RN  [facility]

## 2022-03-21 DIAGNOSIS — G25.81 RESTLESS LEGS SYNDROME: ICD-10-CM

## 2022-03-22 RX ORDER — ROPINIROLE 0.25 MG/1
TABLET, FILM COATED ORAL
Qty: 270 TABLET | Refills: 0 | Status: SHIPPED | OUTPATIENT
Start: 2022-03-22 | End: 2022-06-01

## 2022-03-22 NOTE — TELEPHONE ENCOUNTER
Antiparkinson's Agents Protocol Passed 03/21/2022 08:14 PM   Protocol Details  Blood pressure under 140/90 in past 12 months    CBC on record in past 12 months    ALT on record in past 12 months        Serum Creatinine on file in past 12 months    Medication is active on med list    Patient is age 18 or older    No active pregnancy on record    No positive pregnancy test in the past 12 months    Recent (6 mo) or future (30 days) visit within the authorizing provider's specialty

## 2022-04-05 NOTE — TELEPHONE ENCOUNTER
Patient wants to speak with Dr Zapien's nurse about this refill request.  OK to leave message on voicemail.   · Hgb 11 2 on admission   · No baseline labs available  · Denies melena, hematochezia  · Check folate, B12, iron panel, occult stool  · Continue to trend CBC  · Continue workup for possible colon malignancy

## 2022-04-21 ENCOUNTER — NURSE TRIAGE (OUTPATIENT)
Dept: NURSING | Facility: CLINIC | Age: 87
End: 2022-04-21
Payer: MEDICARE

## 2022-04-21 ENCOUNTER — HOSPITAL ENCOUNTER (EMERGENCY)
Facility: CLINIC | Age: 87
Discharge: HOME | End: 2022-04-21
Attending: EMERGENCY MEDICINE | Admitting: EMERGENCY MEDICINE
Payer: MEDICARE

## 2022-04-21 VITALS
RESPIRATION RATE: 18 BRPM | HEIGHT: 57 IN | WEIGHT: 169 LBS | BODY MASS INDEX: 36.46 KG/M2 | DIASTOLIC BLOOD PRESSURE: 76 MMHG | SYSTOLIC BLOOD PRESSURE: 136 MMHG | TEMPERATURE: 98.2 F | HEART RATE: 70 BPM | OXYGEN SATURATION: 98 %

## 2022-04-21 DIAGNOSIS — K92.0 DARK EMESIS: ICD-10-CM

## 2022-04-21 LAB
ABO/RH(D): NORMAL
ALBUMIN SERPL-MCNC: 3.6 G/DL (ref 3.4–5)
ALP SERPL-CCNC: 88 U/L (ref 40–150)
ALT SERPL W P-5'-P-CCNC: 27 U/L (ref 0–50)
ANION GAP SERPL CALCULATED.3IONS-SCNC: 7 MMOL/L (ref 3–14)
ANTIBODY SCREEN: NEGATIVE
AST SERPL W P-5'-P-CCNC: 23 U/L (ref 0–45)
ATRIAL RATE - MUSE: 117 BPM
BASOPHILS # BLD AUTO: 0.1 10E3/UL (ref 0–0.2)
BASOPHILS NFR BLD AUTO: 1 %
BILIRUB SERPL-MCNC: 0.4 MG/DL (ref 0.2–1.3)
BUN SERPL-MCNC: 16 MG/DL (ref 7–30)
CALCIUM SERPL-MCNC: 9.3 MG/DL (ref 8.5–10.1)
CHLORIDE BLD-SCNC: 103 MMOL/L (ref 94–109)
CO2 SERPL-SCNC: 29 MMOL/L (ref 20–32)
CREAT SERPL-MCNC: 0.83 MG/DL (ref 0.52–1.04)
DIASTOLIC BLOOD PRESSURE - MUSE: NORMAL MMHG
EOSINOPHIL # BLD AUTO: 0.1 10E3/UL (ref 0–0.7)
EOSINOPHIL NFR BLD AUTO: 1 %
ERYTHROCYTE [DISTWIDTH] IN BLOOD BY AUTOMATED COUNT: 14.1 % (ref 10–15)
GFR SERPL CREATININE-BSD FRML MDRD: 67 ML/MIN/1.73M2
GLUCOSE BLD-MCNC: 90 MG/DL (ref 70–99)
HCT VFR BLD AUTO: 38.8 % (ref 35–47)
HGB BLD-MCNC: 12.3 G/DL (ref 11.7–15.7)
IMM GRANULOCYTES # BLD: 0 10E3/UL
IMM GRANULOCYTES NFR BLD: 0 %
INR PPP: 1.13 (ref 0.85–1.15)
INTERPRETATION ECG - MUSE: NORMAL
LYMPHOCYTES # BLD AUTO: 1.7 10E3/UL (ref 0.8–5.3)
LYMPHOCYTES NFR BLD AUTO: 22 %
MCH RBC QN AUTO: 29.7 PG (ref 26.5–33)
MCHC RBC AUTO-ENTMCNC: 31.7 G/DL (ref 31.5–36.5)
MCV RBC AUTO: 94 FL (ref 78–100)
MONOCYTES # BLD AUTO: 0.6 10E3/UL (ref 0–1.3)
MONOCYTES NFR BLD AUTO: 8 %
NEUTROPHILS # BLD AUTO: 5.3 10E3/UL (ref 1.6–8.3)
NEUTROPHILS NFR BLD AUTO: 68 %
NRBC # BLD AUTO: 0 10E3/UL
NRBC BLD AUTO-RTO: 0 /100
P AXIS - MUSE: NORMAL DEGREES
PLATELET # BLD AUTO: 230 10E3/UL (ref 150–450)
POTASSIUM BLD-SCNC: 3.8 MMOL/L (ref 3.4–5.3)
PR INTERVAL - MUSE: NORMAL MS
PROT SERPL-MCNC: 7.5 G/DL (ref 6.8–8.8)
QRS DURATION - MUSE: 102 MS
QT - MUSE: 316 MS
QTC - MUSE: 427 MS
R AXIS - MUSE: -67 DEGREES
RBC # BLD AUTO: 4.14 10E6/UL (ref 3.8–5.2)
SODIUM SERPL-SCNC: 139 MMOL/L (ref 133–144)
SPECIMEN EXPIRATION DATE: NORMAL
SYSTOLIC BLOOD PRESSURE - MUSE: NORMAL MMHG
T AXIS - MUSE: 51 DEGREES
VENTRICULAR RATE- MUSE: 110 BPM
WBC # BLD AUTO: 7.9 10E3/UL (ref 4–11)

## 2022-04-21 PROCEDURE — 85610 PROTHROMBIN TIME: CPT | Performed by: EMERGENCY MEDICINE

## 2022-04-21 PROCEDURE — 86901 BLOOD TYPING SEROLOGIC RH(D): CPT | Performed by: EMERGENCY MEDICINE

## 2022-04-21 PROCEDURE — 99284 EMERGENCY DEPT VISIT MOD MDM: CPT | Performed by: EMERGENCY MEDICINE

## 2022-04-21 PROCEDURE — 85004 AUTOMATED DIFF WBC COUNT: CPT | Performed by: EMERGENCY MEDICINE

## 2022-04-21 PROCEDURE — 99285 EMERGENCY DEPT VISIT HI MDM: CPT | Mod: 25 | Performed by: EMERGENCY MEDICINE

## 2022-04-21 PROCEDURE — 36415 COLL VENOUS BLD VENIPUNCTURE: CPT | Performed by: EMERGENCY MEDICINE

## 2022-04-21 PROCEDURE — 93010 ELECTROCARDIOGRAM REPORT: CPT | Performed by: EMERGENCY MEDICINE

## 2022-04-21 PROCEDURE — 80053 COMPREHEN METABOLIC PANEL: CPT | Performed by: EMERGENCY MEDICINE

## 2022-04-21 PROCEDURE — 93005 ELECTROCARDIOGRAM TRACING: CPT | Performed by: EMERGENCY MEDICINE

## 2022-04-21 ASSESSMENT — ENCOUNTER SYMPTOMS
EYE REDNESS: 0
HEADACHES: 0
ARTHRALGIAS: 0
NECK STIFFNESS: 0
NAUSEA: 1
FEVER: 0
ADENOPATHY: 0
BRUISES/BLEEDS EASILY: 0
CHILLS: 0
DIFFICULTY URINATING: 0
VOMITING: 1
POLYDIPSIA: 0
ABDOMINAL PAIN: 0
CONFUSION: 0
SHORTNESS OF BREATH: 0
COLOR CHANGE: 0
NECK PAIN: 0

## 2022-04-21 NOTE — TELEPHONE ENCOUNTER
"Triage call:     Patient calling after vomiting \"black slime.\"   She reports she took two senna pills last night for constipation. She passed a bowel movement and 10minutes later she vomited \"black slime.\"   Denies pain.   Per protocol, patient was advised to go to ER now. She verbalizes understanding and agreement with this plan.     Maral Harmon RN   04/21/22 11:11 AM  [facility] Nurse Advisor    Reason for Disposition    Vomiting red blood or black (coffee ground) material    Additional Information    Negative: Shock suspected (e.g., cold/pale/clammy skin, too weak to stand, low BP, rapid pulse)    Negative: Difficult to awaken or acting confused (e.g., disoriented, slurred speech)    Negative: Sounds like a life-threatening emergency to the triager    Negative: Vomiting occurs only while coughing    Negative: Pregnant < 20 Weeks and nausea/vomiting began in early pregnancy (i.e., 4-8 weeks pregnant)    Negative: Chest pain    Negative: Headache is main symptom    Negative: SEVERE vomiting (e.g., 6 or more times/day) (Exception: patient sounds well, is drinking liquids, does not sound dehydrated, and vomiting has lasted less than 24 hours)    Negative: MODERATE vomiting (e.g., 3 - 5 times/day) and age > 60    Negative: Vomiting contains bile (green color)    Protocols used: VOMITING-A-OH    COVID 19 Nurse Triage Plan/Patient Instructions    Please be aware that novel coronavirus (COVID-19) may be circulating in the community. If you develop symptoms such as fever, cough, or SOB or if you have concerns about the presence of another infection including coronavirus (COVID-19), please contact your health care provider or visit [email or web address].     Disposition/Instructions    ED Visit recommended. Follow protocol based instructions.     Bring Your Own Device:  Please also bring your smart device(s) (smart phones, tablets, laptops) and their charging cables for your personal use and to communicate " with your care team during your visit.    Thank you for taking steps to prevent the spread of this virus.  o Limit your contact with others.  o Wear a simple mask to cover your cough.  o Wash your hands well and often.    Resources    [facility]: About COVID-19: [email or web address]    CDC: What to Do If You're Sick: www.cdc.gov/coronavirus/2019-ncov/about/steps-when-sick.html    CDC: Ending Home Isolation: www.cdc.gov/coronavirus/2019-ncov/hcp/disposition-in-home-patients.html     CDC: Caring for Someone: www.cdc.gov/coronavirus/2019-ncov/if-you-are-sick/care-for-someone.html     [facility]: Interim Guidance for Hospital Discharge to Home: www.[email or web address].[email or web address]./diseases/coronavirus/hcp/hospdischarge.pdf    [facility] clinical trials (COVID-19 research studies): [email or web address].[facility]-clinical-trials     Below are the COVID-19 hotlines at the [facility] ([facility]). Interpreters are available.   o For health questions: Call 186-866-8848 or 1-896.652.4813 (7 a.m. to 7 p.m.)  o For questions about schools and childcare: Call 810-239-6457 or 1-876.706.5284 (7 a.m. to 7 p.m.)

## 2022-04-21 NOTE — DISCHARGE INSTRUCTIONS
Please make an appointment to follow up with Your Primary Care Provider and GI Clinic (phone: [phone]) in 1-2 days for further evaluation and recommendations.  If your symptoms recur prior to your follow-up appointment or you start feeling lightheaded please come back to the emergency department.

## 2022-04-22 ENCOUNTER — TELEPHONE (OUTPATIENT)
Dept: FAMILY MEDICINE | Facility: CLINIC | Age: 87
End: 2022-04-22
Payer: MEDICARE

## 2022-04-22 ENCOUNTER — PATIENT OUTREACH (OUTPATIENT)
Dept: CARE COORDINATION | Facility: CLINIC | Age: 87
End: 2022-04-22
Payer: MEDICARE

## 2022-04-22 DIAGNOSIS — Z71.89 OTHER SPECIFIED COUNSELING: ICD-10-CM

## 2022-04-22 NOTE — PROGRESS NOTES
Clinic Care Coordination Contact  [facility]: Post-Discharge Note  SITUATION                                                      Admission:    Admission Date: 04/21/22   Reason for Admission:   Nausea    Hematemesis  Discharge:   Discharge Date: 04/21/22  Discharge Diagnosis:   Nausea    Hematemesis    BACKGROUND                                                      Per hospital discharge summary and inpatient provider notes:  Dimple Oviedo is a 88 year old female who has a significant medical history of A. fib (on Xarelto), CAF, HLD, urothelial cancer, GERD, amongst others, who presents to the Emergency Department with c/o black liquid emesis.  Patient reports 1 episode of vomiting occurring this morning after bowel movement, which produced small amount of black liquid emesis. She denies having history of symptoms.   She reports being s/p cholecystectomy approxi-5 to 6 years ago, denies recent surgery.  Reports taking Xarelto daily for A. fib, denies missing a dose reports a history of epistaxis initially starting Xarelto, the dose has been lowered and problems have resolved since.  No prior history of gastrointestinal bleeding while on anticoagulants.  She reports that her stools are dark at baseline because she takes iron tablets.  She reports calling her doctor's office today reporting symptoms, was referred to the ED. currently she denies any lightheadedness, nausea, abdominal pain.  She does not consume alcohol.  No fevers.  No other concerns or complaints.         ASSESSMENT      Enrollment  Primary Care Care Coordination Status: Declined    Discharge Assessment  How are you doing now that you are home?: Patient reports she is well, no questions  How are your symptoms? (Red Flag symptoms escalate to triage hotline per guidelines): Improved  Do you feel your condition is stable enough to be safe at home until your provider visit?: Yes  Does the patient have their discharge instructions? : Yes  Does  the patient have questions regarding their discharge instructions? : No  Were you started on any new medications or were there changes to any of your previous medications? : No  Discharge follow-up appointment scheduled within 14 calendar days? : Yes  Discharge Follow Up Appointment Date: 05/02/22  Discharge Follow Up Appointment Scheduled with?: Primary Care Provider                  PLAN                                                      Outpatient Plan:    Please make an appointment to follow up with  Your Primary Care Provider and GI Clinic  (phone: [phone]) in 1-2 days for further  evaluation and recommendations. If your  symptoms recur prior to your follow-up  appointment or you start feeling lightheaded  please come back to the emergency  department.      Future Appointments   Date Time Provider Department Center   5/2/2022  3:00 PM Pop Zapien MD [facility]   5/20/2022  9:00 AM [facility]   5/20/2022  9:40 AM [email or web address]   5/31/2022 12:00 PM Sd Fine MD [facility]   5/31/2022  2:30 PM Dhara Meza MD [facility]   6/10/2022  8:40 AM Justin Henry MD [facility]         For any urgent concerns, please contact our 24 hour nurse triage line: [phone] ([phone])         ELIZABETH Dodd  [phone]  [facility]

## 2022-04-22 NOTE — TELEPHONE ENCOUNTER
Reason for Call:  Other appointment    Detailed comments: Patient was in ER yesterday. Patient is to come in to talk to Dr Zapien. Patient wants an appointment with Dr Zapien.     Phone Number Patient can be reached at: Cell number on file:    Telephone Information:   Mobile [phone]       Best Time: anytime    Can we leave a detailed message on this number? Not Applicable    Call taken on 4/22/2022 at 12:21 PM by Danielle Justice

## 2022-05-02 ENCOUNTER — OFFICE VISIT (OUTPATIENT)
Dept: FAMILY MEDICINE | Facility: CLINIC | Age: 87
End: 2022-05-02
Payer: MEDICARE

## 2022-05-02 VITALS
SYSTOLIC BLOOD PRESSURE: 130 MMHG | BODY MASS INDEX: 36.46 KG/M2 | WEIGHT: 169 LBS | TEMPERATURE: 98 F | OXYGEN SATURATION: 95 % | HEIGHT: 57 IN | RESPIRATION RATE: 15 BRPM | DIASTOLIC BLOOD PRESSURE: 83 MMHG | HEART RATE: 78 BPM

## 2022-05-02 DIAGNOSIS — Z79.899 ENCOUNTER FOR LONG-TERM (CURRENT) USE OF MEDICATIONS: ICD-10-CM

## 2022-05-02 DIAGNOSIS — K21.9 GASTROESOPHAGEAL REFLUX DISEASE WITHOUT ESOPHAGITIS: ICD-10-CM

## 2022-05-02 DIAGNOSIS — M79.89 LEG SWELLING: Primary | ICD-10-CM

## 2022-05-02 DIAGNOSIS — K92.0 DARK EMESIS: ICD-10-CM

## 2022-05-02 DIAGNOSIS — G25.81 RESTLESS LEG SYNDROME: ICD-10-CM

## 2022-05-02 PROCEDURE — 99214 OFFICE O/P EST MOD 30 MIN: CPT | Performed by: FAMILY MEDICINE

## 2022-05-02 ASSESSMENT — ASTHMA QUESTIONNAIRES
QUESTION_3 LAST FOUR WEEKS HOW OFTEN DID YOUR ASTHMA SYMPTOMS (WHEEZING, COUGHING, SHORTNESS OF BREATH, CHEST TIGHTNESS OR PAIN) WAKE YOU UP AT NIGHT OR EARLIER THAN USUAL IN THE MORNING: NOT AT ALL
QUESTION_5 LAST FOUR WEEKS HOW WOULD YOU RATE YOUR ASTHMA CONTROL: WELL CONTROLLED
QUESTION_1 LAST FOUR WEEKS HOW MUCH OF THE TIME DID YOUR ASTHMA KEEP YOU FROM GETTING AS MUCH DONE AT WORK, SCHOOL OR AT HOME: NONE OF THE TIME
QUESTION_4 LAST FOUR WEEKS HOW OFTEN HAVE YOU USED YOUR RESCUE INHALER OR NEBULIZER MEDICATION (SUCH AS ALBUTEROL): NOT AT ALL
ACT_TOTALSCORE: 23
ACT_TOTALSCORE: 23
QUESTION_2 LAST FOUR WEEKS HOW OFTEN HAVE YOU HAD SHORTNESS OF BREATH: ONCE OR TWICE A WEEK

## 2022-05-02 ASSESSMENT — PATIENT HEALTH QUESTIONNAIRE - PHQ9
10. IF YOU CHECKED OFF ANY PROBLEMS, HOW DIFFICULT HAVE THESE PROBLEMS MADE IT FOR YOU TO DO YOUR WORK, TAKE CARE OF THINGS AT HOME, OR GET ALONG WITH OTHER PEOPLE: NOT DIFFICULT AT ALL
SUM OF ALL RESPONSES TO PHQ QUESTIONS 1-9: 4
SUM OF ALL RESPONSES TO PHQ QUESTIONS 1-9: 4

## 2022-05-02 ASSESSMENT — ANXIETY QUESTIONNAIRES
2. NOT BEING ABLE TO STOP OR CONTROL WORRYING: NOT AT ALL
GAD7 TOTAL SCORE: 0
5. BEING SO RESTLESS THAT IT IS HARD TO SIT STILL: NOT AT ALL
6. BECOMING EASILY ANNOYED OR IRRITABLE: NOT AT ALL
GAD7 TOTAL SCORE: 0
1. FEELING NERVOUS, ANXIOUS, OR ON EDGE: NOT AT ALL
7. FEELING AFRAID AS IF SOMETHING AWFUL MIGHT HAPPEN: NOT AT ALL
7. FEELING AFRAID AS IF SOMETHING AWFUL MIGHT HAPPEN: NOT AT ALL
4. TROUBLE RELAXING: NOT AT ALL
GAD7 TOTAL SCORE: 0
3. WORRYING TOO MUCH ABOUT DIFFERENT THINGS: NOT AT ALL

## 2022-05-02 NOTE — PROGRESS NOTES
"  Assessment & Plan     Leg swelling  Chronic.  Continue with compression socks and Lasix.    Gastroesophageal reflux disease without esophagitis  Long-term PPI side effects discussed.  Would reasonable to continue the same Rx for now.  - omeprazole (PRILOSEC) 20 MG DR capsule; TAKE 1 CAPSULE EVERY DAY    Restless leg syndrome  Requip is not helping much and she is hoping to get back on the dose.  I recommended her to give up on early afternoon dose and just continue with evening dose and if still no major change she can cut down evening dose to 1 pill and gradually cut back.    Encounter for long-term (current) use of medications  Continue with anticoagulant as she is using.  She is also using baby aspirin I recommended her to stop it.    Dark emesis  Reviewed emergency room work-up.  She has not had any other episodes since ER visit.  Continue with current recommendations without any other intervention.  We discussed red flag symptoms and when to seek follow-up care.               BMI:   Estimated body mass index is 36.57 kg/m  as calculated from the following:    Height as of this encounter: 1.448 m (4' 9\").    Weight as of this encounter: 76.7 kg (169 lb).           Return in about 6 months (around 11/2/2022).    Pop Zapien MD, MD  [facility]    Davey Musa is a 88 year old who presents for the following health issues     History of Present Illness       Mental Health Follow-up:                    Today's PHQ-9         PHQ-9 Total Score: 4  PHQ-9 Q9 Thoughts of better off dead/self-harm past 2 weeks :   (P) Not at all    How difficult have these problems made it for you to do your work, take care of things at home, or get along with other people: Not difficult at all    Today's THERON-7 Score: 0    Reason for visit:  Ed follow up and medication request  Symptom onset:  1-2 weeks ago  Symptoms include:  Feeling fine. No more vomiting. Hx of blood problems  Symptom " intensity:  Mild  Symptom progression:  Improving  Had these symptoms before:  No  What makes it worse:  No  What makes it better:  No        Post Discharge Outreach 4/22/2022   Admission Date 4/21/2022   Reason for Admission   Nausea    Hematemesis   Discharge Date 4/21/2022   Discharge Diagnosis   Nausea    Hematemesis   How are you doing now that you are home? Patient reports she is well, no questions   How are your symptoms? (Red Flag symptoms escalate to triage hotline per guidelines) Improved   Do you feel your condition is stable enough to be safe at home until your provider visit? Yes   Does the patient have their discharge instructions?  Yes   Does the patient have questions regarding their discharge instructions?  No   Were you started on any new medications or were there changes to any of your previous medications?  No   Does the patient have all of their medications? -   Do you have questions regarding any of your medications?  -   Do you have all of your needed medical supplies or equipment (DME)?  (i.e. oxygen tank, CPAP, cane, etc.) -   Discharge follow-up appointment scheduled within 14 calendar days?  Yes   Discharge Follow Up Appointment Date 5/2/2022   Discharge Follow Up Appointment Scheduled with? Primary Care Provider     ED/UC Followup:    Facility:  [facility] Emergency Department    Date of visit: 4/21/2022  Reason for visit: Dark emesis    Current Status:  Pt state feeling better only vommit once after the ed visit     Threw up darker mucus. Threw up 3 times and went to ER.   None since then.   History of nose bleed, blood in stool etc in the past possibly due to xarelto.   Dose of xarelto decreased.   Was on amoxicillin after joint replacement.     Leg swelling - not a new thing. Wrapping every night when goes to bed. Takes 2 lasix per day. Admits could be better with salt.     requip - not sure if it is helping. Hoping to back off.     Review of Systems         Objective    BP  "130/83 (BP Location: Right arm, Patient Position: Chair, Cuff Size: Adult Regular)   Pulse 78   Temp 98  F (36.7  C) (Temporal)   Resp 15   Ht 1.448 m (4' 9\")   Wt 76.7 kg (169 lb)   SpO2 95%   BMI 36.57 kg/m    Body mass index is 36.57 kg/m .  Physical Exam   Bilateral non pitting edema,                  "

## 2022-05-03 PROBLEM — N17.9 AKI (ACUTE KIDNEY INJURY) (H): Status: RESOLVED | Noted: 2018-09-11 | Resolved: 2022-05-03

## 2022-05-03 ASSESSMENT — PATIENT HEALTH QUESTIONNAIRE - PHQ9: SUM OF ALL RESPONSES TO PHQ QUESTIONS 1-9: 4

## 2022-05-03 ASSESSMENT — ANXIETY QUESTIONNAIRES: GAD7 TOTAL SCORE: 0

## 2022-05-20 ENCOUNTER — LAB (OUTPATIENT)
Dept: LAB | Facility: CLINIC | Age: 87
End: 2022-05-20
Attending: INTERNAL MEDICINE

## 2022-05-20 ENCOUNTER — ANCILLARY PROCEDURE (OUTPATIENT)
Dept: CT IMAGING | Facility: CLINIC | Age: 87
End: 2022-05-20
Attending: INTERNAL MEDICINE
Payer: MEDICARE

## 2022-05-20 DIAGNOSIS — E05.00 GRAVES DISEASE: ICD-10-CM

## 2022-05-20 DIAGNOSIS — C66.1 UROTHELIAL CARCINOMA OF RIGHT DISTAL URETER (H): ICD-10-CM

## 2022-05-20 LAB
ALBUMIN SERPL-MCNC: 3.6 G/DL (ref 3.4–5)
ALP SERPL-CCNC: 82 U/L (ref 40–150)
ALT SERPL W P-5'-P-CCNC: 21 U/L (ref 0–50)
ANION GAP SERPL CALCULATED.3IONS-SCNC: 10 MMOL/L (ref 3–14)
AST SERPL W P-5'-P-CCNC: 20 U/L (ref 0–45)
BASOPHILS # BLD AUTO: 0.1 10E3/UL (ref 0–0.2)
BASOPHILS NFR BLD AUTO: 1 %
BILIRUB SERPL-MCNC: 0.5 MG/DL (ref 0.2–1.3)
BUN SERPL-MCNC: 18 MG/DL (ref 7–30)
CALCIUM SERPL-MCNC: 9.2 MG/DL (ref 8.5–10.1)
CHLORIDE BLD-SCNC: 101 MMOL/L (ref 94–109)
CO2 SERPL-SCNC: 27 MMOL/L (ref 20–32)
CREAT SERPL-MCNC: 0.95 MG/DL (ref 0.52–1.04)
EOSINOPHIL # BLD AUTO: 0.1 10E3/UL (ref 0–0.7)
EOSINOPHIL NFR BLD AUTO: 2 %
ERYTHROCYTE [DISTWIDTH] IN BLOOD BY AUTOMATED COUNT: 13.8 % (ref 10–15)
GFR SERPL CREATININE-BSD FRML MDRD: 57 ML/MIN/1.73M2
GLUCOSE BLD-MCNC: 138 MG/DL (ref 70–99)
HCT VFR BLD AUTO: 41 % (ref 35–47)
HGB BLD-MCNC: 12.7 G/DL (ref 11.7–15.7)
IMM GRANULOCYTES # BLD: 0 10E3/UL
IMM GRANULOCYTES NFR BLD: 0 %
LYMPHOCYTES # BLD AUTO: 1 10E3/UL (ref 0.8–5.3)
LYMPHOCYTES NFR BLD AUTO: 19 %
MCH RBC QN AUTO: 29.7 PG (ref 26.5–33)
MCHC RBC AUTO-ENTMCNC: 31 G/DL (ref 31.5–36.5)
MCV RBC AUTO: 96 FL (ref 78–100)
MONOCYTES # BLD AUTO: 0.4 10E3/UL (ref 0–1.3)
MONOCYTES NFR BLD AUTO: 6 %
NEUTROPHILS # BLD AUTO: 3.9 10E3/UL (ref 1.6–8.3)
NEUTROPHILS NFR BLD AUTO: 72 %
NRBC # BLD AUTO: 0 10E3/UL
NRBC BLD AUTO-RTO: 0 /100
PLATELET # BLD AUTO: 223 10E3/UL (ref 150–450)
POTASSIUM BLD-SCNC: 3.8 MMOL/L (ref 3.4–5.3)
PROT SERPL-MCNC: 7.2 G/DL (ref 6.8–8.8)
RBC # BLD AUTO: 4.28 10E6/UL (ref 3.8–5.2)
SODIUM SERPL-SCNC: 138 MMOL/L (ref 133–144)
TSH SERPL DL<=0.005 MIU/L-ACNC: 2.9 MU/L (ref 0.4–4)
WBC # BLD AUTO: 5.5 10E3/UL (ref 4–11)

## 2022-05-20 PROCEDURE — 88112 CYTOPATH CELL ENHANCE TECH: CPT | Mod: TC | Performed by: INTERNAL MEDICINE

## 2022-05-20 PROCEDURE — 88120 CYTP URNE 3-5 PROBES EA SPEC: CPT | Mod: TC | Performed by: INTERNAL MEDICINE

## 2022-05-20 PROCEDURE — 71260 CT THORAX DX C+: CPT | Mod: GC | Performed by: RADIOLOGY

## 2022-05-20 PROCEDURE — 36415 COLL VENOUS BLD VENIPUNCTURE: CPT | Performed by: PATHOLOGY

## 2022-05-20 PROCEDURE — 88120 CYTP URNE 3-5 PROBES EA SPEC: CPT | Mod: 26 | Performed by: PATHOLOGY

## 2022-05-20 PROCEDURE — 74178 CT ABD&PLV WO CNTR FLWD CNTR: CPT | Mod: GC | Performed by: RADIOLOGY

## 2022-05-20 PROCEDURE — 80050 GENERAL HEALTH PANEL: CPT | Performed by: PATHOLOGY

## 2022-05-20 PROCEDURE — 88112 CYTOPATH CELL ENHANCE TECH: CPT | Mod: 26 | Performed by: PATHOLOGY

## 2022-05-20 RX ORDER — IOPAMIDOL 755 MG/ML
104 INJECTION, SOLUTION INTRAVASCULAR ONCE
Status: COMPLETED | OUTPATIENT
Start: 2022-05-20 | End: 2022-05-20

## 2022-05-20 RX ADMIN — IOPAMIDOL 104 ML: 755 INJECTION, SOLUTION INTRAVASCULAR at 10:09

## 2022-05-21 DIAGNOSIS — I87.303 STASIS EDEMA OF BOTH LOWER EXTREMITIES: ICD-10-CM

## 2022-05-24 RX ORDER — FUROSEMIDE 20 MG
TABLET ORAL
Qty: 180 TABLET | Refills: 1 | Status: SHIPPED | OUTPATIENT
Start: 2022-05-24 | End: 2023-02-23

## 2022-05-24 NOTE — TELEPHONE ENCOUNTER
Diuretics (Including Combos) Protocol Passed 05/21/2022 11:37 AM   Protocol Details  Blood pressure under 140/90 in past 12 months    Recent (12 mo) or future (30 days) visit within the authorizing provider's specialty    Medication is active on med list    Patient is age 18 or older    No active pregancy on record    Normal serum creatinine on file in past 12 months    Normal serum potassium on file in past 12 months    Normal serum sodium on file in past 12 months    No positive pregnancy test in past 12 months

## 2022-05-30 DIAGNOSIS — G25.81 RESTLESS LEGS SYNDROME: ICD-10-CM

## 2022-05-30 DIAGNOSIS — E05.00 GRAVES DISEASE: ICD-10-CM

## 2022-05-30 DIAGNOSIS — E04.2 NONTOXIC MULTINODULAR GOITER: ICD-10-CM

## 2022-05-31 ENCOUNTER — ONCOLOGY VISIT (OUTPATIENT)
Dept: ONCOLOGY | Facility: CLINIC | Age: 87
End: 2022-05-31
Attending: INTERNAL MEDICINE
Payer: MEDICARE

## 2022-05-31 ENCOUNTER — VIRTUAL VISIT (OUTPATIENT)
Dept: ENDOCRINOLOGY | Facility: CLINIC | Age: 87
End: 2022-05-31
Payer: MEDICARE

## 2022-05-31 ENCOUNTER — HOSPITAL ENCOUNTER (EMERGENCY)
Facility: CLINIC | Age: 87
Discharge: HOME | End: 2022-06-01
Attending: EMERGENCY MEDICINE | Admitting: EMERGENCY MEDICINE
Payer: MEDICARE

## 2022-05-31 VITALS
SYSTOLIC BLOOD PRESSURE: 118 MMHG | WEIGHT: 170.2 LBS | OXYGEN SATURATION: 95 % | BODY MASS INDEX: 36.83 KG/M2 | HEART RATE: 71 BPM | TEMPERATURE: 97.7 F | DIASTOLIC BLOOD PRESSURE: 63 MMHG

## 2022-05-31 DIAGNOSIS — E05.00 GRAVES DISEASE: Primary | ICD-10-CM

## 2022-05-31 DIAGNOSIS — C66.1 UROTHELIAL CARCINOMA OF RIGHT DISTAL URETER (H): Primary | ICD-10-CM

## 2022-05-31 DIAGNOSIS — R04.0 EPISTAXIS: Primary | ICD-10-CM

## 2022-05-31 DIAGNOSIS — E05.00 GRAVES DISEASE: ICD-10-CM

## 2022-05-31 LAB
BASOPHILS # BLD AUTO: 0.1 10E3/UL (ref 0–0.2)
BASOPHILS NFR BLD AUTO: 1 %
EOSINOPHIL # BLD AUTO: 0.1 10E3/UL (ref 0–0.7)
EOSINOPHIL NFR BLD AUTO: 1 %
ERYTHROCYTE [DISTWIDTH] IN BLOOD BY AUTOMATED COUNT: 13.7 % (ref 10–15)
HCT VFR BLD AUTO: 39.8 % (ref 35–47)
HGB BLD-MCNC: 12.8 G/DL (ref 11.7–15.7)
IMM GRANULOCYTES # BLD: 0 10E3/UL
IMM GRANULOCYTES NFR BLD: 0 %
LYMPHOCYTES # BLD AUTO: 1.4 10E3/UL (ref 0.8–5.3)
LYMPHOCYTES NFR BLD AUTO: 13 %
MCH RBC QN AUTO: 29.7 PG (ref 26.5–33)
MCHC RBC AUTO-ENTMCNC: 32.2 G/DL (ref 31.5–36.5)
MCV RBC AUTO: 92 FL (ref 78–100)
MONOCYTES # BLD AUTO: 0.8 10E3/UL (ref 0–1.3)
MONOCYTES NFR BLD AUTO: 7 %
NEUTROPHILS # BLD AUTO: 8.8 10E3/UL (ref 1.6–8.3)
NEUTROPHILS NFR BLD AUTO: 78 %
NRBC # BLD AUTO: 0 10E3/UL
NRBC BLD AUTO-RTO: 0 /100
PLATELET # BLD AUTO: 236 10E3/UL (ref 150–450)
RBC # BLD AUTO: 4.31 10E6/UL (ref 3.8–5.2)
WBC # BLD AUTO: 11.2 10E3/UL (ref 4–11)

## 2022-05-31 PROCEDURE — 99213 OFFICE O/P EST LOW 20 MIN: CPT | Performed by: INTERNAL MEDICINE

## 2022-05-31 PROCEDURE — 80053 COMPREHEN METABOLIC PANEL: CPT | Performed by: EMERGENCY MEDICINE

## 2022-05-31 PROCEDURE — 99283 EMERGENCY DEPT VISIT LOW MDM: CPT | Performed by: EMERGENCY MEDICINE

## 2022-05-31 PROCEDURE — G0463 HOSPITAL OUTPT CLINIC VISIT: HCPCS

## 2022-05-31 PROCEDURE — 85730 THROMBOPLASTIN TIME PARTIAL: CPT | Performed by: EMERGENCY MEDICINE

## 2022-05-31 PROCEDURE — 99442 PR PHYSICIAN TELEPHONE EVALUATION 11-20 MIN: CPT | Mod: 95 | Performed by: INTERNAL MEDICINE

## 2022-05-31 PROCEDURE — 30901 CONTROL OF NOSEBLEED: CPT | Mod: LT | Performed by: EMERGENCY MEDICINE

## 2022-05-31 PROCEDURE — 250N000013 HC RX MED GY IP 250 OP 250 PS 637: Performed by: EMERGENCY MEDICINE

## 2022-05-31 PROCEDURE — 85025 COMPLETE CBC W/AUTO DIFF WBC: CPT | Performed by: EMERGENCY MEDICINE

## 2022-05-31 PROCEDURE — 36415 COLL VENOUS BLD VENIPUNCTURE: CPT | Performed by: EMERGENCY MEDICINE

## 2022-05-31 PROCEDURE — 99284 EMERGENCY DEPT VISIT MOD MDM: CPT | Mod: 25 | Performed by: EMERGENCY MEDICINE

## 2022-05-31 PROCEDURE — 250N000009 HC RX 250: Performed by: EMERGENCY MEDICINE

## 2022-05-31 PROCEDURE — 85610 PROTHROMBIN TIME: CPT | Performed by: EMERGENCY MEDICINE

## 2022-05-31 RX ORDER — TRANEXAMIC ACID 100 MG/ML
INJECTION, SOLUTION INTRAVENOUS ONCE
Status: COMPLETED | OUTPATIENT
Start: 2022-05-31 | End: 2022-05-31

## 2022-05-31 RX ORDER — CYCLOSPORINE 0.5 MG/ML
EMULSION OPHTHALMIC
COMMUNITY
Start: 2022-02-25

## 2022-05-31 RX ADMIN — PHENYLEPHRINE HYDROCHLORIDE 1 SPRAY: 0.25 SPRAY NASAL at 23:30

## 2022-05-31 RX ADMIN — TRANEXAMIC ACID 1000 MG: 1 INJECTION, SOLUTION INTRAVENOUS at 23:31

## 2022-05-31 ASSESSMENT — PAIN SCALES - GENERAL: PAINLEVEL: NO PAIN (0)

## 2022-05-31 NOTE — LETTER
5/31/2022       RE: Dimple Oviedo  [address]  [facility] [id number]     Dear Colleague,    Thank you for referring your patient, Dimple Oviedo, to the [facility] ENDOCR[facility] [place] [facility]. Please see a copy of my visit note below.      This 88  year old woman was scheduled for a virtual visit for f/u of Graves.  She unfortunately could not get online so it was converted to telephone visit.  She first developed symptoms of hyperthyroidism in December 2017.  She was most troubled by the tremor that occurred.  She had not noticed change in her skin or hair.  She saw Dr. Rojas in the community and he started her on methimazole.  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  Because she had received an iodine dye load when she had a coronary angiogram, we were unable to do a radioiodine scan and uptake to determine the precise cause of her hyperthyroidism.  She was maintained on methimazole until June 2017 when it was stopped.  A radioiodine uptake and scan was done in July, showed an uptake of ~ 70%, and confirmed she had Graves' disease.  She was restarted on methimazole in July 2018 and we have been adjusting the dose since then.  She has been taking 5 mg alternating with 2.5  every AM every other day for many months.  On this dose she generally felt well.Her energy level is good.  Her appetite is fine.  She has no tremor.  She does have atrial fibrillation but reports that she does not feel any palpitations.  She does not feel too hot or cold.  She denies diplopia or mattering.  She doesn't think her thyroid has changed in size.    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  She is being carefully monitored for recurrence.  She is pain-free and feels very good.  She has been having problems with  nosebleeds lately and her other caregivers are trying to change her anticoagulation without much benefit.      Patient Active Problem List   Diagnosis     Esophageal reflux     Restless leg syndrome     heat intolerance     Goiter     Disorder of bone and cartilage     Other psoriasis     perirectal cyst     Malignant melanoma of skin of trunk, except scrotum (H)     Nontoxic multinodular goiter     Hyperlipidemia LDL goal <130     Hypertension goal BP (blood pressure) < 140/90     Urinary incontinence     Hip arthritis     Polymyalgia rheumatica (H)     High risk medication use     Shoulder pain     Impaired fasting blood sugar     Chronic bilateral low back pain without sciatica     Obesity, unspecified obesity severity, unspecified obesity type     Iron deficiency anemia, unspecified iron deficiency anemia type     NSTEMI (non-ST elevated myocardial infarction) (H)     Stress-induced cardiomyopathy     A-fib (H)     Anxiety     Chronic systolic heart failure (H)     Urothelial carcinoma (H)     Hyperthyroidism     Hydronephrosis     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)     Graves disease     Encounter for antineoplastic chemotherapy     Primary localized osteoarthritis of right knee     Urothelial cancer (H)     Malignant neoplasm of overlapping sites of bladder (H)     Primary osteoarthritis of right knee     Chronic kidney disease, stage 3 (H)     Lipedematous scalp     Atrial fibrillation with rapid ventricular response (H)     Morbid obesity (H)     Aneurysm of renal artery (H)     Osteopenia, unspecified location     Asthma     Current Outpatient Medications   Medication     alendronate (FOSAMAX) 70 MG tablet     diltiazem ER (DILT-XR) 180 MG 24 hr capsule     ferrous sulfate 325 (65 Fe) MG TBEC EC tablet     furosemide (LASIX) 20 MG tablet     irbesartan (AVAPRO) 300 MG tablet     lovastatin (MEVACOR) 40 MG tablet     methimazole (TAPAZOLE) 5 MG tablet     Omega-3 Fatty Acids (FISH OIL) 500 MG  "CAPS     [START ON 6/2/2022] omeprazole (PRILOSEC) 20 MG DR capsule     propranolol ER (INDERAL LA) 60 MG 24 hr capsule     RESTASIS 0.05 % ophthalmic emulsion     rivaroxaban ANTICOAGULANT (XARELTO) 10 MG TABS tablet     rOPINIRole (REQUIP) 0.25 MG tablet     sertraline (ZOLOFT) 100 MG tablet     traZODone (DESYREL) 50 MG tablet     Current Facility-Administered Medications   Medication     lidocaine (XYLOCAINE) 2 % external gel         5/2/2022   1449 Most Recent Value      Temp: 98  F (36.7  C) 98  F (36.7  C)  as of 5/2/2022     Pulse: 78 78  as of 5/2/2022     BP: 130/83 130/83  as of 5/2/2022     Resp: 15 15  as of 5/2/2022     SpO2: 95 % 95%  as of 5/2/2022     Body Mass Index:  None     Height: 1.448 m (4' 9\") 1.448 m (4' 9\")  as of 5/2/2022     Weight: 76.7 kg (169 lb) 76.7 kg (169 lb)  as of 5/2/2022         On the phone she is in no acute distress.  ENDO THYROID LABS-[facility] Latest Ref Rng & Units 5/20/2022 9/2/2021   TSH 0.40 - 4.00 mU/L 2.90 1.24   FREE T3 2.3 - 4.2 pg/mL     T3 60 - 181 ng/dL     FREE T4 0.76 - 1.46 ng/dL     THYROGLOBULIN ZARA <40 IU/mL     THYR PEROXIDASE ZARA <35 IU/mL     THYROID STIM IMMUNOG <=1.3 TSI index         US THYROID 10/27/2021 1:01 PM     COMPARISON: Thyroid ultrasound 11/26/2018, thyroid scintigraphy exam  7/3/2018, CT chest abdomen pelvis 5/11/2021     HISTORY: Nontoxic multinodular goiter. Per chart, new nodule in the  right lobe on exam.     FINDINGS:   Thyroid parenchyma: Similar diffusely heterogeneous and multinodular  appearance, not readily assessed with TI-RADS.  The right lobe of the thyroid measures: 4.6 x 3.5 x 6.4 cm.  The left lobe of the thyroid measures: 3.2 x 3.1 x 5.6 cm.  The thyroid isthmus measures: 8 mm.                                                                      Impression:  Largely unchanged diffusely heterogeneous and multinodular thyroid,  not readily assessed with TI-RADS, compatible with known history of  Graves disease and nontoxic " multinodular goiter.              ACR TI-RADS recommendations  TR2 (2 points) & TR1 (0 points) -No FNA or follow-up  TR3 (3 points) - FNA if ? 2.5cm, follow-up if 1.5 -2.4 cm in 1, 3 and  5 years  TR4 (4-6 points) - FNA if ? 1.5cm, follow-up if 1 -1.4 cm in 1, 2, 3  and 5 years  TR5 (?7 points) - FNA if ? 1cm, follow-up if 0.5 -0.9 cm every year  for 5 years      I have personally reviewed the examination and initial interpretation  and I agree with the findings.     REBA REED MD      Assessment and plan:    This 88-year-old woman has a history of Graves' disease that we have managed with methimazole since 2018.  She is on a stable dose with good control of her thyroid function.  The most recent TSH is at target.  Ultrasound done 6 months ago was unchanged from these ultrasound done in 2018.  Overall she is stable and will continue with the current plan.  I reminded her that methimazole can cause problems with agranulocytosis and that she should have her blood counts done should she develop a fever.      I spent 6 minutes on the phone with the patient today (start time 2: 4 0 and end time 2: 4 6).  On the day of visit I spent an additional 15 minutes reviewing her chart, reviewing and interpreting her lab and imaging data, and doing documentation.  I will plan to repeat her thyroid tests in 6 months and again in 12 months.  I will see her back face-to-face in 12 months.    Dimple is a 88 year old who is being evaluated via a billable video visit.      How would you like to obtain your AVS? MyChart  If the video visit is dropped, the invitation should be resent by: Send to e-mail at: [email or web address]  Will anyone else be joining your video visit? No

## 2022-05-31 NOTE — PROGRESS NOTES
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    HISTORY OF PRESENT ILLNESS:  Ms. 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 Her oncologic history is as under.    Ms. Oviedo is doing well. She denies any complains suggestive of recurrent disease. She has carpal tunnel symptoms in her left hand and is wearing a brace. She has numbness in the thumb, index and middle finger of her left hand.      ONCOLOGIC HISTORY:  1. 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   - Jan 2018 to September 2018- Persistent gross hematuria and underwent multiple procedures.    - 2/19/18 and 4/3/18- cystoscopies with bladder biopsies  which were negative for bladder tumor  - 1/17/18, 3/8/18, 7/26/18, 9/10/18- Multiple urine cytologies have come back positive by FISH for high-grade transitional cell carcinoma  - 9/10/18- Underwent a bilateral cystoureteroscopy with biopsy and brushing that showed  right upper tract cytology suspicious for high-grade urothelial ca. Right ureter brushing negative from that day. Left upper tract negative.  - 9/10/18- CT A/P without contrast showed new small bilateral pleural effusions and interstitial pulmonary edema but no evidence of locoregional or metastatic disease.  - 9/12/18- Presented to ED with oliguria, CRESENCIO, and mild hydronephrosis bilaterally on CT scan and underwent bilateral ureteral stent placment  - 11/13/2018- Right robotic nephroureterectomy, excision of sandip-caval mass and LN excision by Stuart King. Pathology showed \"Right nephroureterectomy: Invasive high grade urothelial carcinoma measuring 3.5 cm, " "located in renal pelvis and invading through renal parenchyma into perinephric fat. Urothelial carcinoma in-situ present. Margins free of tumor. Ana-caval mass: Metastatic urothelial carcinoma involving one lymph node with at least 1 cm tumor deposit. Pericaval Extranodal Extension present. Five additional benign pericaval lymph nodes. Pathologic stage: pT4N1 (1 of 6 lymph nodes).\"  - 12/11/18- PET/CT whole body demonstrated significant residual FDG avid disease. For example, \"there is an FDG avid ill-defined pericaval mass immediately medial to the surgical clips measuring approximately 2.0 x 1.4 cm, with max SUV measuring up to12.2, see series 4 image 21. Additional FDG avid retrocaval and interaortocaval lymphadenopathy are noted.There is a 1.2 cm nodule in the right hemipelvis posterior lateral tothe bladder with max SUV measuring 8.3, see series 4 image 77. The bladder is incompletely distended.\" No suspicious thoracic or bone uptake.  - 12/12/18- Started adjuvant chemotherapy (carbo+gem) per POUT trial. Cycle 2 - 1/2/19. Cycle 3 - 1/23/19. Cycle 4 - 02/13/19.  - 1/22/19 - CT C/A/P with contrast compared with prior PET/CT: \"1. New well-circumscribed soft tissue pulmonary nodules of the right lower lobe and left upper lobe. Findings could be infectious, inflammatory, or represent metastatic disease. Continued attention on follow-up recommended. Postoperative changes of right nephrectomy. No evidence for local recurrence in the present study.\"  - 3/1/2019- CT C/A/P with contrast - \"1. Bilateral pulmonary nodules measuring up to 4 mm in the right lower lobe, previously measuring 8 mm. No new or enlarging pulmonary nodules. 2. No convincing evidence for metastatic disease in the abdomen, pelvis and bones.\"  - 6/3/2019- CT C/A/P with contrast - \"1. Surgical changes of right nephrectomy without evidence of residual or recurrent disease on this noncontrast exam.  Consider contrast enhanced examination on follow-up. " "2. No evidence of metastatic disease in the abdomen, or pelvis on noncontrast CT.  Scattered tiny pulmonary nodules in the chest are not significantly changed.  Previously described prominent right lower lobe pulmonary nodule (previously measuring up to 8 mm) is no longer visualized. 3. Stable bilateral pulmonary nodules measuring maximally 4 mm.\"  - 09/11/19: CT C/A/P without contrast - \"1. Stable exam without evidence convincing for new disease. 2. Stable pulmonary nodules. 3. 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 4. Stable heterogeneous enlargement of the thyroid with underlying nodules suggested.\"  - 12/4/19: CT C/A/P without contrast - \"No acute change since prior exam. No evidence of recurrent or metastatic disease. Tiny lung nodules are stable. Prior right nephrectomy. Left renal artery aneurysm is stable.\"  - 04/08/20, 7/27/20: CT C/A/P with contrast - GABRIELLE.  - 01/12/21: CT C/A/P with contrast - \"1.  Slight increased size of a 6 mm left upper lobe nodule and new 4 mm linear opacity along the right major fissure. These are indeterminate but could represent progression of metastatic disease. 2.  Slight increased size of mildly enlarged mediastinal and hilar lymph nodes. 3.  Mild pulmonary edema. 4.  No recurrent or metastatic disease in the abdomen and pelvis. 5.  Mild stranding around the urinary bladder dome may be related to cystitis. Punctate focus of gas within the urinary bladder either secondary to infection or instrumentation. 6.  Enlarged multinodular thyroid gland.\"    2. Non-muscle invasive bladder cancer:  - 10/3/19: Cystoscopy showed a small area of erythema on retroflexion, possibly pre-existing or due to retroflexion of the scope. Urine cytology from that time showed cells suspicious for malignancy.   - 1/13/20: Bladder biopsy showed high grade urothelial carcinoma in-situ  - 2/12/20-3/18/20: Intravesical BCG therapy  - 7/24/20 and last cystoscopy was on the same day. It was " negative. However, urine cytology was positive for high-grade urothelial carcinoma.   - 11/25/20-12/10/2020: 3 weekly doses of intravesical BCG 50mg.   - 12/10/20: Cytology suspicious and FISH urovysion positive for TCC.    REVIEW OF SYSTEMS: 14 point ROS negative other than the symptoms noted above in the HPI.    PAST MEDICAL AND SURGICAL HISTORY:   Past Medical History:   Diagnosis Date     CRESENCIO (acute kidney injury) (H) 9/11/2018     Arthritis      Asthma 2/9/2022     Calculus of kidney      Chemotherapy-induced neutropenia (H) 3/6/2019     Congestive heart failure (H)      Esophageal reflux      GERD (gastroesophageal reflux disease)      Hyperlipidemia LDL goal <130 5/9/2010     Malignant melanoma of skin of trunk, except scrotum (H)      Nonspecific abnormal finding     has living will 2004 -      Nontoxic multinodular goiter     no further eval /tx rec per pt     Osteopenia      Other psoriasis      Personal history of colonic polyps      PMR (polymyalgia rheumatica) (H)      Restless legs syndrome 7/11/2018     Stress-induced cardiomyopathy      Undiagnosed cardiac murmurs      Unspecified constipation      Unspecified essential hypertension      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     Past Surgical History:   Procedure Laterality Date     ARTHROPLASTY KNEE Right 11/9/2020    Procedure: ARTHROPLASTY, KNEE, TOTAL, RIGHT;  Surgeon: Edward Otero MD;  [facility]     BIOPSY       COLONOSCOPY  2014     COMBINED CYSTOSCOPY, INSERT STENT URETER(S) Bilateral 9/12/2018    Procedure: COMBINED CYSTOSCOPY, INSERT STENT URETER(S);  Cystoscopy Bilateral ureteral Stent Placement.;  Surgeon: Justin Henry MD;  [facility]     COMBINED CYSTOSCOPY, RETROGRADES, URETEROSCOPY, INSERT STENT Bilateral 4/3/2018    Procedure: COMBINED CYSTOSCOPY, RETROGRADES, URETEROSCOPY, INSERT STENT;;  Surgeon: Stuart King MD;  [facility]     COMBINED CYSTOSCOPY, RETROGRADES, URETEROSCOPY, INSERT STENT  Bilateral 9/10/2018    Procedure: COMBINED CYSTOSCOPY, RETROGRADES, URETEROSCOPY, INSERT STENT;  Cystoscopy, Bilateral Ureteroscopy, Bladder Biopsies, Retrogram Pyelograms, Ureteral Washings and brushings, cysview;  Surgeon: Stuart King MD;  [facility]     COMBINED CYSTOSCOPY, RETROGRADES, URETEROSCOPY, INSERT STENT Left 8/26/2020    Procedure: Cystoscopy, Left Ureteral Wash, Left ureteral Retrograde Pyelogram;  Surgeon: Justin Henry MD;  [facility]     CYSTOSCOPY, BIOPSY BLADDER INSTILL OPTICAL AGENT N/A 4/3/2018    Procedure: CYSTOSCOPY, BIOPSY BLADDER INSTILL OPTICAL AGENT;  Cystoscopy, Blue Light Cystoscopy, Bladder Biopsies, Bilateral Selective ureteral washings for Cytology, Bilateral Retrograde Pyelograms, Bilateral Ureteroscopy;  Surgeon: Stuart King MD;  [facility]     CYSTOSCOPY, BIOPSY BLADDER, COMBINED N/A 2/19/2018    Procedure: COMBINED CYSTOSCOPY, BIOPSY BLADDER;  Cystoscopy, Bladder Biopsy;  Surgeon: Kenna La MD;  [facility]     CYSTOSCOPY, BIOPSY BLADDER, COMBINED N/A 8/26/2020    Procedure: Cystoscopy, biopsy bladder, combined;  Surgeon: Justin Henry MD;  [facility]     CYSTOSCOPY, FULGURATE BLADDER TUMOR, COMBINED N/A 1/13/2020    Procedure: CYSTOSCOPY, WITH  bladder biopsies and fulguration;  Surgeon: Stuart King MD;  [facility]     CYSTOSCOPY, REMOVE STENT(S), COMBINED  11/13/2018    Procedure: Flexible Cystoscopy with Stent Removal;  Surgeon: Stuart King MD;  [facility]     DAVINCI LYMPHADENECTOMY N/A 11/13/2018    Procedure: Davinci Lymphadenectomy ;  Surgeon: Stuart King MD;  [facility]     DAVINCI NEPHROURETERECTOMY N/A 11/13/2018    Procedure: Right DaVinci Assisted Nephroureterectomy;  Surgeon: Stuart King MD;  [facility]     ENDOSCOPIC ULTRASOUND LOWER GASTROINTESTIONAL TRACT (GI) N/A 10/30/2015    Procedure: ENDOSCOPIC ULTRASOUND  LOWER GASTROINTESTIONAL TRACT (GI);  Surgeon: Daniel Jean-Baptiste MD;  [facility]     EYE SURGERY  12/4/17     INSERT PORT VASCULAR ACCESS Right 12/19/2018    Procedure: Chest Port Placement - right;  Surgeon: Stuart Chavez PA-C;  [facility]     IR CHEST PORT PLACEMENT > 5 YRS OF AGE  12/19/2018     IR PORT REMOVAL RIGHT  6/26/2019     LAPAROSCOPIC CHOLECYSTECTOMY WITH CHOLANGIOGRAMS N/A 11/1/2015    Procedure: LAPAROSCOPIC CHOLECYSTECTOMY WITH CHOLANGIOGRAMS;  Surgeon: Tonie Warren MD;  [facility]     REMOVE PORT VASCULAR ACCESS Right 6/26/2019    Procedure: Right Port Removal;  Surgeon: Froilan Irizarry PA-C;  [facility]     SURGICAL HISTORY OF -   1996    malignant melanoma     SURGICAL HISTORY OF -   1968    thyroid nodule     SURGICAL HISTORY OF -       D & C     ZZC NONSPECIFIC PROCEDURE  2005    colonoscopy polyp repeat 2010     SOCIAL HISTORY:   Social History     Tobacco Use     Smoking status: Never Smoker     Smokeless tobacco: Never Used   Vaping Use     Vaping Use: Never used   Substance Use Topics     Alcohol use: No     Alcohol/week: 0.0 standard drinks     Comment: rare     Drug use: No     FAMILY HISTORY:   Family History   Problem Relation Age of Onset     Cancer Father         dec - esophageal and laryngeal     Heart Disease Mother      Respiratory Mother         dec     Breast Cancer Daughter      Other Cancer Daughter      Thyroid Disease Daughter      Asthma Daughter      Hyperlipidemia Son      Diabetes Son      Anesthesia Reaction No family hx of      Deep Vein Thrombosis (DVT) No family hx of      ALLERGIES:   Allergies   Allergen Reactions     Codeine      CURRENT MEDICATIONS:   Current Outpatient Medications:      alendronate (FOSAMAX) 70 MG tablet, TAKE 1 TABLET EVERY 7 DAYS AT LEAST 60 MINUTES BEFORE BREAKFAST AS DIRECTED, Disp: 12 tablet, Rfl: 2     diltiazem ER (DILT-XR) 180 MG 24 hr capsule, Take 1 capsule (180 mg) by mouth daily, Disp:  90 capsule, Rfl: 0     ferrous sulfate 325 (65 Fe) MG TBEC EC tablet, Take 325 mg by mouth every morning , Disp: , Rfl:      furosemide (LASIX) 20 MG tablet, TAKE 1 TABLET TWICE DAILY IN THE MORNING AND AFTER LUNCH, Disp: 180 tablet, Rfl: 1     irbesartan (AVAPRO) 300 MG tablet, TAKE 1 TABLET EVERY DAY, Disp: 90 tablet, Rfl: 2     lovastatin (MEVACOR) 40 MG tablet, Take 1 tablet (40 mg) by mouth At Bedtime, Disp: 90 tablet, Rfl: 3     methimazole (TAPAZOLE) 5 MG tablet, Take 5 mg alternating with 2.5 mg every other day, Disp: 90 tablet, Rfl: 1     Omega-3 Fatty Acids (FISH OIL) 500 MG CAPS, Take 1 capsule by mouth every morning , Disp: , Rfl:      [START ON 6/2/2022] omeprazole (PRILOSEC) 20 MG DR capsule, TAKE 1 CAPSULE EVERY DAY, Disp: 90 capsule, Rfl: 1     propranolol ER (INDERAL LA) 60 MG 24 hr capsule, TAKE 1 CAPSULE EVERY DAY, Disp: 90 capsule, Rfl: 3     rivaroxaban ANTICOAGULANT (XARELTO) 10 MG TABS tablet, Take 1 tablet (10 mg) by mouth daily (with dinner), Disp: 90 tablet, Rfl: 1     rOPINIRole (REQUIP) 0.25 MG tablet, TAKE 1 TABLET IN THE AFTERNOON AND TAKE 2 TABLETS EVERY NIGHT, Disp: 270 tablet, Rfl: 0     sertraline (ZOLOFT) 100 MG tablet, TAKE 1 TABLET EVERY MORNING, Disp: 90 tablet, Rfl: 3     traZODone (DESYREL) 50 MG tablet, TAKE 1/2 TABLET AT BEDTIME, Disp: 45 tablet, Rfl: 3     RESTASIS 0.05 % ophthalmic emulsion, , Disp: , Rfl:     Current Facility-Administered Medications:      lidocaine (XYLOCAINE) 2 % external gel, , Urethral, Once, Justin Henry MD    PHYSICAL EXAMINATION:  Deferred. Phone visit due to COVID.    LABORATORY DATA:     Recent Labs   Lab Test 05/20/22  0856 04/21/22  1348 12/09/21  2356 11/19/21  0921 11/19/21  0911 09/02/21  0644    139 138  --  141 139   POTASSIUM 3.8 3.8 4.1  --  4.4 4.0   CHLORIDE 101 103 103  --  105 109   CO2 27 29 29  --  28 25   ANIONGAP 10 7 6  --  8 5   BUN 18 16 26  --  22 14   CR 0.95 0.83 0.98 1.2* 1.08* 0.97   * 90 103*  --   130* 107*   SHREYA 9.2 9.3 9.1  --  9.2 8.8     Recent Labs   Lab Test 09/02/21  0644 09/01/21  1505 02/03/19  2102 12/12/18  1050 01/16/18  1247 01/16/18  0646 12/13/17  2236 04/18/16  0905 11/02/15  0912 11/01/15  0727 10/31/15  0810   MAG 2.4* 2.3 1.8 2.1 2.1   < > 2.0  --   --  2.0  --    PHOS  --   --   --   --   --   --  2.4* 3.2 3.2 2.1* 2.4*    < > = values in this interval not displayed.     Recent Labs   Lab Test 05/20/22  0856 04/21/22  1348 12/09/21  2357 11/19/21  0911 09/01/21  1505   WBC 5.5 7.9 6.8 7.5 6.3   HGB 12.7 12.3 11.1* 11.4* 12.4    230 266 233 260   MCV 96 94 94 95 96   NEUTROPHIL 72 68 58 72 66     Recent Labs   Lab Test 05/20/22  0856 04/21/22  1348 12/09/21  2356 11/19/21  0911 08/11/21  1031 09/11/18  0612 07/17/18  1346   BILITOTAL 0.5 0.4 0.4 0.5 0.4   < >  --    ALKPHOS 82 88 105 98 88   < >  --    ALT 21 27 20 23 17   < >  --    AST 20 23 20 15 14   < >  --    ALBUMIN 3.6 3.6 3.4 3.4 3.5   < >  --    LDH  --   --   --  180 176  --  204    < > = values in this interval not displayed.     TSH   Date Value Ref Range Status   05/20/2022 2.90 0.40 - 4.00 mU/L Final   09/02/2021 1.24 0.40 - 4.00 mU/L Final   07/05/2021 1.68 0.40 - 4.00 mU/L Final   05/27/2021 1.93 0.40 - 4.00 mU/L Final   01/27/2021 2.42 0.40 - 4.00 mU/L Final     No results for input(s): CEA in the last [id number] hours.  Results for orders placed or performed in visit on 05/20/22   CT Abdomen wo & w Chest Pelvis w Contrast    Narrative    EXAMINATION: CT ABDOMEN WO & W CHEST PELVIS W CONTRAST,  5/20/2022 10:30 AM    TECHNIQUE:  Helical CT images from the thoracic inlet through the  symphysis pubis were obtained with IV contrast utilizing CT urogram  protocol of the abdomen and pelvis. Contrast dose: Isovue 370 104cc    COMPARISON: Chest abdomen and pelvis CT 11/19/2021, 7/27/2020,  6/3/2019, and whole body PET/CT 12/11/2018    HISTORY: bladder cancer surveillance; Urothelial carcinoma of right  distal ureter (H); Graves  disease    FINDINGS:  CHEST:  LUNGS: Central tracheobronchial tree is patent. No pneumothorax or  pleural effusion. No focal airspace opacity. Unchanged scattered  calcified granulomas for example in the right upper lobe (series 9,  image 78) and right lung base (series 9, image 220). No new or  enlarging pulmonary nodule. Multiple sub-6 mm pulmonary nodules are  unchanged including:    -5 mm solid nodule left upper lobe (series 9, image 86).  -3 mm groundglass nodule right lower lobe (series 9, image 149).    MEDIASTINUM: Cardiomegaly with biatrial enlargement. Calcifications of  the aortic annulus. The ascending aorta and main pulmonary artery  diameters are within normal limits. No central pulmonary embolus.  Scattered atherosclerotic calcifications of the aortic arch. Normal  configuration of the great vessels off the aortic arch. Stable 1 cm  pretracheal node (series 6, image 88). No suspicious hilar or axillary  lymph nodes.    Enlarged multinodular thyroid. Moderately sized paraesophageal hernia.    ABDOMEN/PELVIS:  LIVER: No suspicious focal hepatic lesion.    BILIARY: Cholecystectomy. Dilated common bile duct measuring up to 13  mm PANCREAS: No focal pancreatic lesion. The main pancreatic duct is  not dilated.    SPLEEN: Small splenule, otherwise unremarkable.    ADRENAL GLANDS: No focal adrenal nodule.    URINARY TRACT: Postoperative changes of right nephrectomy. No evidence  for local disease recurrence in the nephrectomy bed. No suspicious  left renal lesion. Unchanged nonobstructing 4 mm left renal stone. No  hydronephrosis. The left ureter is unremarkable. Continued slight  decreased thickening and enhancement along the anterior wall of the  urinary bladder.    REPRODUCTIVE ORGANS: The uterus and left ovary are unremarkable. The  right ovary is not well-visualized.    STOMACH: Moderately-sized paraesophageal hernia. Stomach is otherwise  unremarkable.    BOWEL: Normal caliber large and small bowel. No  abnormal bowel wall  thickening or enhancement. Appendix is not confidently identified  however there is no inflammatory change or quadrant to suggest acute  appendicitis.    PERITONEUM/FLUID: No ascites or pelvic free fluid.    VESSELS: No aneurysmal dilatation of the abdominal aorta.  The portal,  splenic, and superior mesenteric veins are patent.  The origins of the  celiac and superior mesenteric arteries are patent. 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    LYMPH NODES: No lymphadenopathy. The previously visualized borderline  right iliac chain lymph node now measures 0.7 cm (series 6, image 46),  likely reactive.    BONES/SOFT TISSUES: Exaggerated lumbar lordosis and thoracic kyphosis.  Grade 1 retrolisthesis of L1 on L2. Degenerative changes to the spine  and left greater than right hips. No suspicious sclerotic or lucent  osseous lesion.      Impression    IMPRESSION:  1. Stable postoperative changes of right nephrectomy without evidence  for local disease recurrence.  2. Decreased thickening and enhancement of the anterior urinary  bladder wall. Nonspecific, while noting multiple negative urine  cytology or more than one year.  3. No evidence for metastatic disease in the chest, abdomen, or pelvis.  4. Stable sub-6 mm solid pulmonary nodules.    I have personally reviewed the examination and initial interpretation  and I agree with the findings.    HAO JACKSON MD         SYSTEM ID:  [id number]     *Note: Due to a large number of results and/or encounters for the requested time period, some results have not been displayed. A complete set of results can be found in Results Review.         ASSESSMENT AND PLAN: Ms. 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    1. Upper tract urothelial cancer:   - She  completed 4 cycles of adjuvant carboplatin plus gemcitabine chemotherapy per POUT trial.    She completed adjuvant chemotherapy in Feb 2019 over 3 yrs ago  - No residual side effects or evidence of recurrence.  - Reviewed restaging CT scan from May 20th, 2022; there is no clear evidence of disease recurrence in abd/pelvis.   She continues to have pulmonary nodules which remain stable.    Lung findings are non-specific and these are also not responsible for her shortness of breath    - We will continue to monitor and her get a repeat CT C/A/P without contrast in 6 months.     2. High grade urothelial carcinoma in situ:  - Bx proven carcinoma in situ in 1/2020, completed the first round of intravesical BCG treatment 2/2020-3/2020 and second in 11/2020-12/2020.  - She was initially followed by Dr. King and now is under care of Dr. Froilan Henry.   - She did not have urine cytology done and I will get it after this visit.   - She will continue on 3 weekly BCG every 3 months as part of her maintenance along with follow up cystoscopies    3. Chemo induced anemia and thrombocytopenia:  - Resolved.      4. Persistent HERMAN:  - She follows Dr. Griffith in Cardiology for her h/o moderate AORTIC STENOSIS, CHF, and Afib now with worsening SOA and lymphedema with no cancer-related etiology evident.     Return to clinic in 6 months with restaging scans.    All of the above was explained to the patient in lay language and several questions were answered. They are in agreement with the plan.     25 minutes spent on the date of the encounter doing chart review, history and exam, documentation and further activities as noted above     Sd Fine    Hematologist and Medical Oncologist  [facility]

## 2022-05-31 NOTE — PROGRESS NOTES
This 88  year old woman was scheduled for a virtual visit for f/u of Graves.  She unfortunately could not get online so it was converted to telephone visit.  She first developed symptoms of hyperthyroidism in December 2017.  She was most troubled by the tremor that occurred.  She had not noticed change in her skin or hair.  She saw Dr. Rojas in the community and he started her on methimazole.  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  Because she had received an iodine dye load when she had a coronary angiogram, we were unable to do a radioiodine scan and uptake to determine the precise cause of her hyperthyroidism.  She was maintained on methimazole until June 2017 when it was stopped.  A radioiodine uptake and scan was done in July, showed an uptake of ~ 70%, and confirmed she had Graves' disease.  She was restarted on methimazole in July 2018 and we have been adjusting the dose since then.  She has been taking 5 mg alternating with 2.5  every AM every other day for many months.  On this dose she generally felt well.Her energy level is good.  Her appetite is fine.  She has no tremor.  She does have atrial fibrillation but reports that she does not feel any palpitations.  She does not feel too hot or cold.  She denies diplopia or mattering.  She doesn't think her thyroid has changed in size.    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  She is being carefully monitored for recurrence.  She is pain-free and feels very good.  She has been having problems with nosebleeds lately and her other caregivers are trying to change her anticoagulation without much benefit.      Patient Active Problem List   Diagnosis     Esophageal reflux     Restless leg syndrome     heat intolerance     Goiter     Disorder of bone and cartilage     Other psoriasis     perirectal cyst     Malignant melanoma of skin  of trunk, except scrotum (H)     Nontoxic multinodular goiter     Hyperlipidemia LDL goal <130     Hypertension goal BP (blood pressure) < 140/90     Urinary incontinence     Hip arthritis     Polymyalgia rheumatica (H)     High risk medication use     Shoulder pain     Impaired fasting blood sugar     Chronic bilateral low back pain without sciatica     Obesity, unspecified obesity severity, unspecified obesity type     Iron deficiency anemia, unspecified iron deficiency anemia type     NSTEMI (non-ST elevated myocardial infarction) (H)     Stress-induced cardiomyopathy     A-fib (H)     Anxiety     Chronic systolic heart failure (H)     Urothelial carcinoma (H)     Hyperthyroidism     Hydronephrosis     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)     Graves disease     Encounter for antineoplastic chemotherapy     Primary localized osteoarthritis of right knee     Urothelial cancer (H)     Malignant neoplasm of overlapping sites of bladder (H)     Primary osteoarthritis of right knee     Chronic kidney disease, stage 3 (H)     Lipedematous scalp     Atrial fibrillation with rapid ventricular response (H)     Morbid obesity (H)     Aneurysm of renal artery (H)     Osteopenia, unspecified location     Asthma     Current Outpatient Medications   Medication     alendronate (FOSAMAX) 70 MG tablet     diltiazem ER (DILT-XR) 180 MG 24 hr capsule     ferrous sulfate 325 (65 Fe) MG TBEC EC tablet     furosemide (LASIX) 20 MG tablet     irbesartan (AVAPRO) 300 MG tablet     lovastatin (MEVACOR) 40 MG tablet     methimazole (TAPAZOLE) 5 MG tablet     Omega-3 Fatty Acids (FISH OIL) 500 MG CAPS     [START ON 6/2/2022] omeprazole (PRILOSEC) 20 MG DR capsule     propranolol ER (INDERAL LA) 60 MG 24 hr capsule     RESTASIS 0.05 % ophthalmic emulsion     rivaroxaban ANTICOAGULANT (XARELTO) 10 MG TABS tablet     rOPINIRole (REQUIP) 0.25 MG tablet     sertraline (ZOLOFT) 100 MG tablet     traZODone (DESYREL) 50 MG tablet  "    Current Facility-Administered Medications   Medication     lidocaine (XYLOCAINE) 2 % external gel         5/2/2022   1449 Most Recent Value      Temp: 98  F (36.7  C) 98  F (36.7  C)  as of 5/2/2022     Pulse: 78 78  as of 5/2/2022     BP: 130/83 130/83  as of 5/2/2022     Resp: 15 15  as of 5/2/2022     SpO2: 95 % 95%  as of 5/2/2022     Body Mass Index:  None     Height: 1.448 m (4' 9\") 1.448 m (4' 9\")  as of 5/2/2022     Weight: 76.7 kg (169 lb) 76.7 kg (169 lb)  as of 5/2/2022         On the phone she is in no acute distress.  END[facility] THYROID LABS-[facility] Latest Ref Rng & Units 5/20/2022 9/2/2021   TSH 0.40 - 4.00 mU/L 2.90 1.24   FREE T3 2.3 - 4.2 pg/mL     T3 60 - 181 ng/dL     FREE T4 0.76 - 1.46 ng/dL     THYROGLOBULIN ZARA <40 IU/mL     THYR PEROXIDASE ZARA <35 IU/mL     THYROID STIM IMMUNOG <=1.3 TSI index         US THYROID 10/27/2021 1:01 PM     COMPARISON: Thyroid ultrasound 11/26/2018, thyroid scintigraphy exam  7/3/2018, CT chest abdomen pelvis 5/11/2021     HISTORY: Nontoxic multinodular goiter. Per chart, new nodule in the  right lobe on exam.     FINDINGS:   Thyroid parenchyma: Similar diffusely heterogeneous and multinodular  appearance, not readily assessed with TI-RADS.  The right lobe of the thyroid measures: 4.6 x 3.5 x 6.4 cm.  The left lobe of the thyroid measures: 3.2 x 3.1 x 5.6 cm.  The thyroid isthmus measures: 8 mm.                                                                      Impression:  Largely unchanged diffusely heterogeneous and multinodular thyroid,  not readily assessed with TI-RADS, compatible with known history of  Graves disease and nontoxic multinodular goiter.              ACR TI-RADS recommendations  TR2 (2 points) & TR1 (0 points) -No FNA or follow-up  TR3 (3 points) - FNA if ? 2.5cm, follow-up if 1.5 -2.4 cm in 1, 3 and  5 years  TR4 (4-6 points) - FNA if ? 1.5cm, follow-up if 1 -1.4 cm in 1, 2, 3  and 5 years  TR5 (?7 points) - FNA if ? 1cm, follow-up if 0.5 -0.9 cm " every year  for 5 years      I have personally reviewed the examination and initial interpretation  and I agree with the findings.     REBA REED MD      Assessment and plan:    This 88-year-old woman has a history of Graves' disease that we have managed with methimazole since 2018.  She is on a stable dose with good control of her thyroid function.  The most recent TSH is at target.  Ultrasound done 6 months ago was unchanged from these ultrasound done in 2018.  Overall she is stable and will continue with the current plan.  I reminded her that methimazole can cause problems with agranulocytosis and that she should have her blood counts done should she develop a fever.      I spent 6 minutes on the phone with the patient today (start time 2: 4 0 and end time 2: 4 6).  On the day of visit I spent an additional 15 minutes reviewing her chart, reviewing and interpreting her lab and imaging data, and doing documentation.  I will plan to repeat her thyroid tests in 6 months and again in 12 months.  I will see her back face-to-face in 12 months.

## 2022-05-31 NOTE — PROGRESS NOTES
Dimple is a 88 year old who is being evaluated via a billable video visit.      How would you like to obtain your AVS? MyChart  If the video visit is dropped, the invitation should be resent by: Send to e-mail at: [email or web address]  Will anyone else be joining your video visit? No

## 2022-05-31 NOTE — LETTER
5/31/2022         RE: Dimple Oviedo  [address]  [facility] [address]        Dear Colleague,    Thank you for referring your patient, Dimple Oviedo, to the [facility]. Please see a copy of my visit note below.    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    HISTORY OF PRESENT ILLNESS:  Ms. 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 Her oncologic history is as under.    Ms. Oviedo is doing well. She denies any complains suggestive of recurrent disease. She has carpal tunnel symptoms in her left hand and is wearing a brace. She has numbness in the thumb, index and middle finger of her left hand.      ONCOLOGIC HISTORY:  1. 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   - Jan 2018 to September 2018- Persistent gross hematuria and underwent multiple procedures.    - 2/19/18 and 4/3/18- cystoscopies with bladder biopsies  which were negative for bladder tumor  - 1/17/18, 3/8/18, 7/26/18, 9/10/18- Multiple urine cytologies have come back positive by FISH for high-grade transitional cell carcinoma  - 9/10/18- Underwent a bilateral cystoureteroscopy with biopsy and brushing that showed  right upper tract cytology suspicious for high-grade urothelial ca. Right ureter brushing negative from that day. Left upper tract negative.  - 9/10/18- CT A/P without contrast showed new small bilateral pleural effusions and interstitial pulmonary edema but no evidence of locoregional or metastatic disease.  - 9/12/18- Presented to ED with oliguria, CRESENCIO, and mild hydronephrosis bilaterally on CT scan and  "underwent bilateral ureteral stent placment  - 11/13/2018- Right robotic nephroureterectomy, excision of sandip-caval mass and LN excision by Stuart King. Pathology showed \"Right nephroureterectomy: Invasive high grade urothelial carcinoma measuring 3.5 cm, located in renal pelvis and invading through renal parenchyma into perinephric fat. Urothelial carcinoma in-situ present. Margins free of tumor. Sandip-caval mass: Metastatic urothelial carcinoma involving one lymph node with at least 1 cm tumor deposit. Pericaval Extranodal Extension present. Five additional benign pericaval lymph nodes. Pathologic stage: pT4N1 (1 of 6 lymph nodes).\"  - 12/11/18- PET/CT whole body demonstrated significant residual FDG avid disease. For example, \"there is an FDG avid ill-defined pericaval mass immediately medial to the surgical clips measuring approximately 2.0 x 1.4 cm, with max SUV measuring up to12.2, see series 4 image 21. Additional FDG avid retrocaval and interaortocaval lymphadenopathy are noted.There is a 1.2 cm nodule in the right hemipelvis posterior lateral tothe bladder with max SUV measuring 8.3, see series 4 image 77. The bladder is incompletely distended.\" No suspicious thoracic or bone uptake.  - 12/12/18- Started adjuvant chemotherapy (carbo+gem) per POUT trial. Cycle 2 - 1/2/19. Cycle 3 - 1/23/19. Cycle 4 - 02/13/19.  - 1/22/19 - CT C/A/P with contrast compared with prior PET/CT: \"1. New well-circumscribed soft tissue pulmonary nodules of the right lower lobe and left upper lobe. Findings could be infectious, inflammatory, or represent metastatic disease. Continued attention on follow-up recommended. Postoperative changes of right nephrectomy. No evidence for local recurrence in the present study.\"  - 3/1/2019- CT C/A/P with contrast - \"1. Bilateral pulmonary nodules measuring up to 4 mm in the right lower lobe, previously measuring 8 mm. No new or enlarging pulmonary nodules. 2. No convincing evidence for " "metastatic disease in the abdomen, pelvis and bones.\"  - 6/3/2019- CT C/A/P with contrast - \"1. Surgical changes of right nephrectomy without evidence of residual or recurrent disease on this noncontrast exam.  Consider contrast enhanced examination on follow-up. 2. No evidence of metastatic disease in the abdomen, or pelvis on noncontrast CT.  Scattered tiny pulmonary nodules in the chest are not significantly changed.  Previously described prominent right lower lobe pulmonary nodule (previously measuring up to 8 mm) is no longer visualized. 3. Stable bilateral pulmonary nodules measuring maximally 4 mm.\"  - 09/11/19: CT C/A/P without contrast - \"1. Stable exam without evidence convincing for new disease. 2. Stable pulmonary nodules. 3. 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 4. Stable heterogeneous enlargement of the thyroid with underlying nodules suggested.\"  - 12/4/19: CT C/A/P without contrast - \"No acute change since prior exam. No evidence of recurrent or metastatic disease. Tiny lung nodules are stable. Prior right nephrectomy. Left renal artery aneurysm is stable.\"  - 04/08/20, 7/27/20: CT C/A/P with contrast - GABRIELLE.  - 01/12/21: CT C/A/P with contrast - \"1.  Slight increased size of a 6 mm left upper lobe nodule and new 4 mm linear opacity along the right major fissure. These are indeterminate but could represent progression of metastatic disease. 2.  Slight increased size of mildly enlarged mediastinal and hilar lymph nodes. 3.  Mild pulmonary edema. 4.  No recurrent or metastatic disease in the abdomen and pelvis. 5.  Mild stranding around the urinary bladder dome may be related to cystitis. Punctate focus of gas within the urinary bladder either secondary to infection or instrumentation. 6.  Enlarged multinodular thyroid gland.\"    2. Non-muscle invasive bladder cancer:  - 10/3/19: Cystoscopy showed a small area of erythema on retroflexion, possibly pre-existing or due to retroflexion " of the scope. Urine cytology from that time showed cells suspicious for malignancy.   - 1/13/20: Bladder biopsy showed high grade urothelial carcinoma in-situ  - 2/12/20-3/18/20: Intravesical BCG therapy  - 7/24/20 and last cystoscopy was on the same day. It was negative. However, urine cytology was positive for high-grade urothelial carcinoma.   - 11/25/20-12/10/2020: 3 weekly doses of intravesical BCG 50mg.   - 12/10/20: Cytology suspicious and FISH urovysion positive for TCC.    REVIEW OF SYSTEMS: 14 point ROS negative other than the symptoms noted above in the HPI.    PAST MEDICAL AND SURGICAL HISTORY:   Past Medical History:   Diagnosis Date     CRESENCIO (acute kidney injury) (H) 9/11/2018     Arthritis      Asthma 2/9/2022     Calculus of kidney      Chemotherapy-induced neutropenia (H) 3/6/2019     Congestive heart failure (H)      Esophageal reflux      GERD (gastroesophageal reflux disease)      Hyperlipidemia LDL goal <130 5/9/2010     Malignant melanoma of skin of trunk, except scrotum (H)      Nonspecific abnormal finding     has living will 2004 -      Nontoxic multinodular goiter     no further eval /tx rec per pt     Osteopenia      Other psoriasis      Personal history of colonic polyps      PMR (polymyalgia rheumatica) (H)      Restless legs syndrome 7/11/2018     Stress-induced cardiomyopathy      Undiagnosed cardiac murmurs      Unspecified constipation      Unspecified essential hypertension      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     Past Surgical History:   Procedure Laterality Date     ARTHROPLASTY KNEE Right 11/9/2020    Procedure: ARTHROPLASTY, KNEE, TOTAL, RIGHT;  Surgeon: Edward Otero MD;  [facility]     BIOPSY       COLONOSCOPY  2014     COMBINED CYSTOSCOPY, INSERT STENT URETER(S) Bilateral 9/12/2018    Procedure: COMBINED CYSTOSCOPY, INSERT STENT URETER(S);  Cystoscopy Bilateral ureteral Stent Placement.;  Surgeon: Justin Henry MD;  [facility]     COMBINED  CYSTOSCOPY, RETROGRADES, URETEROSCOPY, INSERT STENT Bilateral 4/3/2018    Procedure: COMBINED CYSTOSCOPY, RETROGRADES, URETEROSCOPY, INSERT STENT;;  Surgeon: Stuart King MD;  [facility]     COMBINED CYSTOSCOPY, RETROGRADES, URETEROSCOPY, INSERT STENT Bilateral 9/10/2018    Procedure: COMBINED CYSTOSCOPY, RETROGRADES, URETEROSCOPY, INSERT STENT;  Cystoscopy, Bilateral Ureteroscopy, Bladder Biopsies, Retrogram Pyelograms, Ureteral Washings and brushings, cysview;  Surgeon: Stuart King MD;  [facility]     COMBINED CYSTOSCOPY, RETROGRADES, URETEROSCOPY, INSERT STENT Left 8/26/2020    Procedure: Cystoscopy, Left Ureteral Wash, Left ureteral Retrograde Pyelogram;  Surgeon: Justin Henry MD;  [facility]     CYSTOSCOPY, BIOPSY BLADDER INSTILL OPTICAL AGENT N/A 4/3/2018    Procedure: CYSTOSCOPY, BIOPSY BLADDER INSTILL OPTICAL AGENT;  Cystoscopy, Blue Light Cystoscopy, Bladder Biopsies, Bilateral Selective ureteral washings for Cytology, Bilateral Retrograde Pyelograms, Bilateral Ureteroscopy;  Surgeon: Stuart King MD;  [facility]     CYSTOSCOPY, BIOPSY BLADDER, COMBINED N/A 2/19/2018    Procedure: COMBINED CYSTOSCOPY, BIOPSY BLADDER;  Cystoscopy, Bladder Biopsy;  Surgeon: Kenna La MD;  [facility]     CYSTOSCOPY, BIOPSY BLADDER, COMBINED N/A 8/26/2020    Procedure: Cystoscopy, biopsy bladder, combined;  Surgeon: Justin Henry MD;  [facility]     CYSTOSCOPY, FULGURATE BLADDER TUMOR, COMBINED N/A 1/13/2020    Procedure: CYSTOSCOPY, WITH  bladder biopsies and fulguration;  Surgeon: Stuart King MD;  [facility]     CYSTOSCOPY, REMOVE STENT(S), COMBINED  11/13/2018    Procedure: Flexible Cystoscopy with Stent Removal;  Surgeon: Stuart King MD;  [facility]     DAVINCI LYMPHADENECTOMY N/A 11/13/2018    Procedure: Davinci Lymphadenectomy ;  Surgeon: Stuart King MD;  [facility]      DAVINCI NEPHROURETERECTOMY N/A 11/13/2018    Procedure: Right DaVinci Assisted Nephroureterectomy;  Surgeon: Stuart King MD;  [facility]     ENDOSCOPIC ULTRASOUND LOWER GASTROINTESTIONAL TRACT (GI) N/A 10/30/2015    Procedure: ENDOSCOPIC ULTRASOUND LOWER GASTROINTESTIONAL TRACT (GI);  Surgeon: Daniel Jean-Baptiste MD;  [facility]     EYE SURGERY  12/4/17     INSERT PORT VASCULAR ACCESS Right 12/19/2018    Procedure: Chest Port Placement - right;  Surgeon: Stuart Chavez PA-C;  [facility]     IR CHEST PORT PLACEMENT > 5 YRS OF AGE  12/19/2018     IR PORT REMOVAL RIGHT  6/26/2019     LAPAROSCOPIC CHOLECYSTECTOMY WITH CHOLANGIOGRAMS N/A 11/1/2015    Procedure: LAPAROSCOPIC CHOLECYSTECTOMY WITH CHOLANGIOGRAMS;  Surgeon: Tonie Warren MD;  [facility]     REMOVE PORT VASCULAR ACCESS Right 6/26/2019    Procedure: Right Port Removal;  Surgeon: Froilan Irizarry PA-C;  [facility]     SURGICAL HISTORY OF -   1996    malignant melanoma     SURGICAL HISTORY OF -   1968    thyroid nodule     SURGICAL HISTORY OF -       D & C     ZZC NONSPECIFIC PROCEDURE  2005    colonoscopy polyp repeat 2010     SOCIAL HISTORY:   Social History     Tobacco Use     Smoking status: Never Smoker     Smokeless tobacco: Never Used   Vaping Use     Vaping Use: Never used   Substance Use Topics     Alcohol use: No     Alcohol/week: 0.0 standard drinks     Comment: rare     Drug use: No     FAMILY HISTORY:   Family History   Problem Relation Age of Onset     Cancer Father         dec - esophageal and laryngeal     Heart Disease Mother      Respiratory Mother         dec     Breast Cancer Daughter      Other Cancer Daughter      Thyroid Disease Daughter      Asthma Daughter      Hyperlipidemia Son      Diabetes Son      Anesthesia Reaction No family hx of      Deep Vein Thrombosis (DVT) No family hx of      ALLERGIES:   Allergies   Allergen Reactions     Codeine      CURRENT  MEDICATIONS:   Current Outpatient Medications:      alendronate (FOSAMAX) 70 MG tablet, TAKE 1 TABLET EVERY 7 DAYS AT LEAST 60 MINUTES BEFORE BREAKFAST AS DIRECTED, Disp: 12 tablet, Rfl: 2     diltiazem ER (DILT-XR) 180 MG 24 hr capsule, Take 1 capsule (180 mg) by mouth daily, Disp: 90 capsule, Rfl: 0     ferrous sulfate 325 (65 Fe) MG TBEC EC tablet, Take 325 mg by mouth every morning , Disp: , Rfl:      furosemide (LASIX) 20 MG tablet, TAKE 1 TABLET TWICE DAILY IN THE MORNING AND AFTER LUNCH, Disp: 180 tablet, Rfl: 1     irbesartan (AVAPRO) 300 MG tablet, TAKE 1 TABLET EVERY DAY, Disp: 90 tablet, Rfl: 2     lovastatin (MEVACOR) 40 MG tablet, Take 1 tablet (40 mg) by mouth At Bedtime, Disp: 90 tablet, Rfl: 3     methimazole (TAPAZOLE) 5 MG tablet, Take 5 mg alternating with 2.5 mg every other day, Disp: 90 tablet, Rfl: 1     Omega-3 Fatty Acids (FISH OIL) 500 MG CAPS, Take 1 capsule by mouth every morning , Disp: , Rfl:      [START ON 6/2/2022] omeprazole (PRILOSEC) 20 MG DR capsule, TAKE 1 CAPSULE EVERY DAY, Disp: 90 capsule, Rfl: 1     propranolol ER (INDERAL LA) 60 MG 24 hr capsule, TAKE 1 CAPSULE EVERY DAY, Disp: 90 capsule, Rfl: 3     rivaroxaban ANTICOAGULANT (XARELTO) 10 MG TABS tablet, Take 1 tablet (10 mg) by mouth daily (with dinner), Disp: 90 tablet, Rfl: 1     rOPINIRole (REQUIP) 0.25 MG tablet, TAKE 1 TABLET IN THE AFTERNOON AND TAKE 2 TABLETS EVERY NIGHT, Disp: 270 tablet, Rfl: 0     sertraline (ZOLOFT) 100 MG tablet, TAKE 1 TABLET EVERY MORNING, Disp: 90 tablet, Rfl: 3     traZODone (DESYREL) 50 MG tablet, TAKE 1/2 TABLET AT BEDTIME, Disp: 45 tablet, Rfl: 3     RESTASIS 0.05 % ophthalmic emulsion, , Disp: , Rfl:     Current Facility-Administered Medications:      lidocaine (XYLOCAINE) 2 % external gel, , Urethral, Once, Justin Henry MD    PHYSICAL EXAMINATION:  Deferred. Phone visit due to COVID.    LABORATORY DATA:     Recent Labs   Lab Test 05/20/22  0856 04/21/22  1348 12/09/21  7905  11/19/21  0921 11/19/21  0911 09/02/21  0644    139 138  --  141 139   POTASSIUM 3.8 3.8 4.1  --  4.4 4.0   CHLORIDE 101 103 103  --  105 109   CO2 27 29 29  --  28 25   ANIONGAP 10 7 6  --  8 5   BUN 18 16 26  --  22 14   CR 0.95 0.83 0.98 1.2* 1.08* 0.97   * 90 103*  --  130* 107*   SHREYA 9.2 9.3 9.1  --  9.2 8.8     Recent Labs   Lab Test 09/02/21  0644 09/01/21  1505 02/03/19  2102 12/12/18  1050 01/16/18  1247 01/16/18  0646 12/13/17  2236 04/18/16  0905 11/02/15  0912 11/01/15  0727 10/31/15  0810   MAG 2.4* 2.3 1.8 2.1 2.1   < > 2.0  --   --  2.0  --    PHOS  --   --   --   --   --   --  2.4* 3.2 3.2 2.1* 2.4*    < > = values in this interval not displayed.     Recent Labs   Lab Test 05/20/22  0856 04/21/22  1348 12/09/21  2357 11/19/21  0911 09/01/21  1505   WBC 5.5 7.9 6.8 7.5 6.3   HGB 12.7 12.3 11.1* 11.4* 12.4    230 266 233 260   MCV 96 94 94 95 96   NEUTROPHIL 72 68 58 72 66     Recent Labs   Lab Test 05/20/22  0856 04/21/22  1348 12/09/21  2356 11/19/21  0911 08/11/21  1031 09/11/18  0612 07/17/18  1346   BILITOTAL 0.5 0.4 0.4 0.5 0.4   < >  --    ALKPHOS 82 88 105 98 88   < >  --    ALT 21 27 20 23 17   < >  --    AST 20 23 20 15 14   < >  --    ALBUMIN 3.6 3.6 3.4 3.4 3.5   < >  --    LDH  --   --   --  180 176  --  204    < > = values in this interval not displayed.     TSH   Date Value Ref Range Status   05/20/2022 2.90 0.40 - 4.00 mU/L Final   09/02/2021 1.24 0.40 - 4.00 mU/L Final   07/05/2021 1.68 0.40 - 4.00 mU/L Final   05/27/2021 1.93 0.40 - 4.00 mU/L Final   01/27/2021 2.42 0.40 - 4.00 mU/L Final     No results for input(s): CEA in the last 17756 hours.  Results for orders placed or performed in visit on 05/20/22   CT Abdomen wo & w Chest Pelvis w Contrast    Narrative    EXAMINATION: CT ABDOMEN WO & W CHEST PELVIS W CONTRAST,  5/20/2022 10:30 AM    TECHNIQUE:  Helical CT images from the thoracic inlet through the  symphysis pubis were obtained with IV contrast utilizing  CT urogram  protocol of the abdomen and pelvis. Contrast dose: Isovue 370 104cc    COMPARISON: Chest abdomen and pelvis CT 11/19/2021, 7/27/2020,  6/3/2019, and whole body PET/CT 12/11/2018    HISTORY: bladder cancer surveillance; Urothelial carcinoma of right  distal ureter (H); Graves disease    FINDINGS:  CHEST:  LUNGS: Central tracheobronchial tree is patent. No pneumothorax or  pleural effusion. No focal airspace opacity. Unchanged scattered  calcified granulomas for example in the right upper lobe (series 9,  image 78) and right lung base (series 9, image 220). No new or  enlarging pulmonary nodule. Multiple sub-6 mm pulmonary nodules are  unchanged including:    -5 mm solid nodule left upper lobe (series 9, image 86).  -3 mm groundglass nodule right lower lobe (series 9, image 149).    MEDIASTINUM: Cardiomegaly with biatrial enlargement. Calcifications of  the aortic annulus. The ascending aorta and main pulmonary artery  diameters are within normal limits. No central pulmonary embolus.  Scattered atherosclerotic calcifications of the aortic arch. Normal  configuration of the great vessels off the aortic arch. Stable 1 cm  pretracheal node (series 6, image 88). No suspicious hilar or axillary  lymph nodes.    Enlarged multinodular thyroid. Moderately sized paraesophageal hernia.    ABDOMEN/PELVIS:  LIVER: No suspicious focal hepatic lesion.    BILIARY: Cholecystectomy. Dilated common bile duct measuring up to 13  mm PANCREAS: No focal pancreatic lesion. The main pancreatic duct is  not dilated.    SPLEEN: Small splenule, otherwise unremarkable.    ADRENAL GLANDS: No focal adrenal nodule.    URINARY TRACT: Postoperative changes of right nephrectomy. No evidence  for local disease recurrence in the nephrectomy bed. No suspicious  left renal lesion. Unchanged nonobstructing 4 mm left renal stone. No  hydronephrosis. The left ureter is unremarkable. Continued slight  decreased thickening and enhancement along the  anterior wall of the  urinary bladder.    REPRODUCTIVE ORGANS: The uterus and left ovary are unremarkable. The  right ovary is not well-visualized.    STOMACH: Moderately-sized paraesophageal hernia. Stomach is otherwise  unremarkable.    BOWEL: Normal caliber large and small bowel. No abnormal bowel wall  thickening or enhancement. Appendix is not confidently identified  however there is no inflammatory change or quadrant to suggest acute  appendicitis.    PERITONEUM/FLUID: No ascites or pelvic free fluid.    VESSELS: No aneurysmal dilatation of the abdominal aorta.  The portal,  splenic, and superior mesenteric veins are patent.  The origins of the  celiac and superior mesenteric arteries are patent. 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    LYMPH NODES: No lymphadenopathy. The previously visualized borderline  right iliac chain lymph node now measures 0.7 cm (series 6, image 46),  likely reactive.    BONES/SOFT TISSUES: Exaggerated lumbar lordosis and thoracic kyphosis.  Grade 1 retrolisthesis of L1 on L2. Degenerative changes to the spine  and left greater than right hips. No suspicious sclerotic or lucent  osseous lesion.      Impression    IMPRESSION:  1. Stable postoperative changes of right nephrectomy without evidence  for local disease recurrence.  2. Decreased thickening and enhancement of the anterior urinary  bladder wall. Nonspecific, while noting multiple negative urine  cytology or more than one year.  3. No evidence for metastatic disease in the chest, abdomen, or pelvis.  4. Stable sub-6 mm solid pulmonary nodules.    I have personally reviewed the examination and initial interpretation  and I agree with the findings.    HAO JACKSON MD         SYSTEM ID:  [id number]     *Note: Due to a large number of results and/or encounters for the requested time period, some results have not been displayed. A complete set of results can be found in Results Review.         ASSESSMENT AND PLAN: Ms. 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    1. Upper tract urothelial cancer:   - She completed 4 cycles of adjuvant carboplatin plus gemcitabine chemotherapy per POUT trial.    She completed adjuvant chemotherapy in Feb 2019 over 3 yrs ago  - No residual side effects or evidence of recurrence.  - Reviewed restaging CT scan from May 20th, 2022; there is no clear evidence of disease recurrence in abd/pelvis.   She continues to have pulmonary nodules which remain stable.    Lung findings are non-specific and these are also not responsible for her shortness of breath    - We will continue to monitor and her get a repeat CT C/A/P without contrast in 6 months.     2. High grade urothelial carcinoma in situ:  - Bx proven carcinoma in situ in 1/2020, completed the first round of intravesical BCG treatment 2/2020-3/2020 and second in 11/2020-12/2020.  - She was initially followed by Dr. King and now is under care of Dr. Froilan Henry.   - She did not have urine cytology done and I will get it after this visit.   - She will continue on 3 weekly BCG every 3 months as part of her maintenance along with follow up cystoscopies    3. Chemo induced anemia and thrombocytopenia:  - Resolved.      4. Persistent HERMAN:  - She follows Dr. Griffith in Cardiology for her h/o moderate AORTIC STENOSIS, CHF, and Afib now with worsening SOA and lymphedema with no cancer-related etiology evident.     Return to clinic in 6 months with restaging scans.    All of the above was explained to the patient in lay language and several questions were answered. They are in agreement with the plan.     25 minutes spent on the date of the encounter doing chart review, history and exam, documentation and further activities as noted above       Again, thank you  for allowing me to participate in the care of your patient.      Sincerely,    Sd Fine MD

## 2022-05-31 NOTE — PATIENT INSTRUCTIONS
Continue the same dose of methimazole (5 mg alternating with 2.5 mg every other day).    Remember that methimazole can affect the infection fighting cells in your blood.  If you get a fever be sure to have your blood count checked.    Have your blood test done again in 6 months.

## 2022-06-01 ENCOUNTER — TELEPHONE (OUTPATIENT)
Dept: OTHER | Age: 87
End: 2022-06-01

## 2022-06-01 VITALS
DIASTOLIC BLOOD PRESSURE: 111 MMHG | HEART RATE: 97 BPM | TEMPERATURE: 97.9 F | OXYGEN SATURATION: 95 % | SYSTOLIC BLOOD PRESSURE: 161 MMHG | RESPIRATION RATE: 18 BRPM | HEIGHT: 57 IN | BODY MASS INDEX: 36.46 KG/M2 | WEIGHT: 169 LBS

## 2022-06-01 LAB
ALBUMIN SERPL-MCNC: 3.7 G/DL (ref 3.4–5)
ALP SERPL-CCNC: 103 U/L (ref 40–150)
ALT SERPL W P-5'-P-CCNC: 21 U/L (ref 0–50)
ANION GAP SERPL CALCULATED.3IONS-SCNC: 7 MMOL/L (ref 3–14)
APTT PPP: 42 SECONDS (ref 22–38)
AST SERPL W P-5'-P-CCNC: 20 U/L (ref 0–45)
BILIRUB SERPL-MCNC: 0.4 MG/DL (ref 0.2–1.3)
BUN SERPL-MCNC: 24 MG/DL (ref 7–30)
CALCIUM SERPL-MCNC: 9.1 MG/DL (ref 8.5–10.1)
CHLORIDE BLD-SCNC: 102 MMOL/L (ref 94–109)
CO2 SERPL-SCNC: 30 MMOL/L (ref 20–32)
CREAT SERPL-MCNC: 0.99 MG/DL (ref 0.52–1.04)
GFR SERPL CREATININE-BSD FRML MDRD: 55 ML/MIN/1.73M2
GLUCOSE BLD-MCNC: 130 MG/DL (ref 70–99)
INR PPP: 1.9 (ref 0.85–1.15)
PATH REPORT.COMMENTS IMP SPEC: NORMAL
PATH REPORT.FINAL DX SPEC: NORMAL
PATH REPORT.GROSS SPEC: NORMAL
PATH REPORT.RELEVANT HX SPEC: NORMAL
POTASSIUM BLD-SCNC: 3.7 MMOL/L (ref 3.4–5.3)
PROT SERPL-MCNC: 7.6 G/DL (ref 6.8–8.8)
SODIUM SERPL-SCNC: 139 MMOL/L (ref 133–144)

## 2022-06-01 PROCEDURE — 250N000013 HC RX MED GY IP 250 OP 250 PS 637: Performed by: EMERGENCY MEDICINE

## 2022-06-01 RX ORDER — ROPINIROLE 0.25 MG/1
0.5 TABLET, FILM COATED ORAL AT BEDTIME
Qty: 180 TABLET | Refills: 0 | Status: SHIPPED | OUTPATIENT
Start: 2022-06-01 | End: 2022-09-12 | Stop reason: DRUGHIGH

## 2022-06-01 RX ORDER — CEPHALEXIN 500 MG/1
500 CAPSULE ORAL 2 TIMES DAILY
Qty: 14 CAPSULE | Refills: 0 | Status: SHIPPED | OUTPATIENT
Start: 2022-06-01 | End: 2022-11-15

## 2022-06-01 RX ORDER — CEPHALEXIN 500 MG/1
500 CAPSULE ORAL 2 TIMES DAILY
Qty: 14 CAPSULE | Refills: 0 | Status: SHIPPED | OUTPATIENT
Start: 2022-06-01 | End: 2022-06-01

## 2022-06-01 RX ORDER — CEPHALEXIN 500 MG/1
500 CAPSULE ORAL ONCE
Status: COMPLETED | OUTPATIENT
Start: 2022-06-01 | End: 2022-06-01

## 2022-06-01 RX ADMIN — CEPHALEXIN 500 MG: 500 CAPSULE ORAL at 01:27

## 2022-06-01 ASSESSMENT — ENCOUNTER SYMPTOMS
VOMITING: 0
PALPITATIONS: 0
FREQUENCY: 0
LIGHT-HEADEDNESS: 0
BACK PAIN: 0
SINUS PAIN: 0
HEADACHES: 0
SHORTNESS OF BREATH: 0
COLOR CHANGE: 0
RHINORRHEA: 0
FEVER: 0
TROUBLE SWALLOWING: 0
FLANK PAIN: 0
HEMATURIA: 0
DIARRHEA: 0
EYE PAIN: 0
COUGH: 0
ABDOMINAL PAIN: 0
NAUSEA: 0
CONSTIPATION: 0
DYSURIA: 0
EYE REDNESS: 0
FATIGUE: 0
PHOTOPHOBIA: 0
WOUND: 0
FACIAL SWELLING: 0
NUMBNESS: 0
CONFUSION: 0
CHILLS: 0
SINUS PRESSURE: 0
WEAKNESS: 0
NECK PAIN: 0
SORE THROAT: 0
VOICE CHANGE: 0
BRUISES/BLEEDS EASILY: 1

## 2022-06-01 NOTE — TELEPHONE ENCOUNTER
Antiparkinson's Agents Protocol Passed 05/30/2022 01:53 PM   Protocol Details  Blood pressure under 140/90 in past 12 months    CBC on record in past 12 months    ALT on record in past 12 months        Serum Creatinine on file in past 12 months    Medication is active on med list    Patient is age 18 or older    No active pregnancy on record    No positive pregnancy test in the past 12 months    Recent (6 mo) or future (30 days) visit within the authorizing provider's specialty

## 2022-06-01 NOTE — ED TRIAGE NOTES
Pt arrives via EMS for epistaxis since 2100, on xarelto, hypertensive. EMS packed and placed a clamp, bled through everything. Has vomited blood. Hypertensive in 180's.      Triage Assessment     Row Name 05/31/22 [id number]       Triage Assessment (Adult)    Airway WDL WDL       Respiratory WDL    Respiratory WDL WDL       Skin Circulation/Temperature WDL    Skin Circulation/Temperature WDL WDL       Cardiac WDL    Cardiac WDL WDL       Peripheral/Neurovascular WDL    Peripheral Neurovascular WDL WDL       Cognitive/Neuro/Behavioral WDL    Cognitive/Neuro/Behavioral WDL WDL

## 2022-06-01 NOTE — ED NOTES
Called Geoff at patient's request and asked for him to come pick patient up and transport home. ETA ~45 minutes.

## 2022-06-01 NOTE — ED PROVIDER NOTES
ED Provider Note  [facility]      History     Chief Complaint   Patient presents with     Epistaxis     HPI  Dimple Oviedo is a 88 year old female who is on Xarelto for past history of atrial fibrillation, presenting for evaluation of epistaxis.  She started to have bleeding from the left nare at 9 PM this evening.  She notes that this is occurred in the past, has had to have nasal packing in the past and has seen ENT in clinic.  She denies any lightheadedness or syncope.  Denies any facial trauma.  She has been taking her Xarelto as prescribed. The patient denies any other physical concerns today.     Past Medical History  Past Medical History:   Diagnosis Date     CRESENCIO (acute kidney injury) (H) 9/11/2018     Arthritis      Asthma 2/9/2022     Calculus of kidney      Chemotherapy-induced neutropenia (H) 3/6/2019     Congestive heart failure (H)      Esophageal reflux      GERD (gastroesophageal reflux disease)      Hyperlipidemia LDL goal <130 5/9/2010     Malignant melanoma of skin of trunk, except scrotum (H)      Nonspecific abnormal finding     has living will 2004 -      Nontoxic multinodular goiter     no further eval /tx rec per pt     Osteopenia      Other psoriasis      Personal history of colonic polyps      PMR (polymyalgia rheumatica) (H)      Restless legs syndrome 7/11/2018     Stress-induced cardiomyopathy      Undiagnosed cardiac murmurs      Unspecified constipation      Unspecified essential hypertension      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     Past Surgical History:   Procedure Laterality Date     ARTHROPLASTY KNEE Right 11/9/2020    Procedure: ARTHROPLASTY, KNEE, TOTAL, RIGHT;  Surgeon: Edward Otero MD;  [facility]     BIOPSY       COLONOSCOPY  2014     COMBINED CYSTOSCOPY, INSERT STENT URETER(S) Bilateral 9/12/2018    Procedure: COMBINED CYSTOSCOPY, INSERT STENT URETER(S);  Cystoscopy Bilateral ureteral Stent Placement.;  Surgeon: Justin Henry  MD Froilan;  [facility]     COMBINED CYSTOSCOPY, RETROGRADES, URETEROSCOPY, INSERT STENT Bilateral 4/3/2018    Procedure: COMBINED CYSTOSCOPY, RETROGRADES, URETEROSCOPY, INSERT STENT;;  Surgeon: Stuart King MD;  [facility]     COMBINED CYSTOSCOPY, RETROGRADES, URETEROSCOPY, INSERT STENT Bilateral 9/10/2018    Procedure: COMBINED CYSTOSCOPY, RETROGRADES, URETEROSCOPY, INSERT STENT;  Cystoscopy, Bilateral Ureteroscopy, Bladder Biopsies, Retrogram Pyelograms, Ureteral Washings and brushings, cysview;  Surgeon: Stuart King MD;  [facility]     COMBINED CYSTOSCOPY, RETROGRADES, URETEROSCOPY, INSERT STENT Left 8/26/2020    Procedure: Cystoscopy, Left Ureteral Wash, Left ureteral Retrograde Pyelogram;  Surgeon: Justin Henry MD;  [facility]     CYSTOSCOPY, BIOPSY BLADDER INSTILL OPTICAL AGENT N/A 4/3/2018    Procedure: CYSTOSCOPY, BIOPSY BLADDER INSTILL OPTICAL AGENT;  Cystoscopy, Blue Light Cystoscopy, Bladder Biopsies, Bilateral Selective ureteral washings for Cytology, Bilateral Retrograde Pyelograms, Bilateral Ureteroscopy;  Surgeon: Stuart King MD;  [facility]     CYSTOSCOPY, BIOPSY BLADDER, COMBINED N/A 2/19/2018    Procedure: COMBINED CYSTOSCOPY, BIOPSY BLADDER;  Cystoscopy, Bladder Biopsy;  Surgeon: Kenna La MD;  [facility]     CYSTOSCOPY, BIOPSY BLADDER, COMBINED N/A 8/26/2020    Procedure: Cystoscopy, biopsy bladder, combined;  Surgeon: Justin Henry MD;  [facility]     CYSTOSCOPY, FULGURATE BLADDER TUMOR, COMBINED N/A 1/13/2020    Procedure: CYSTOSCOPY, WITH  bladder biopsies and fulguration;  Surgeon: Stuart King MD;  [facility]     CYSTOSCOPY, REMOVE STENT(S), COMBINED  11/13/2018    Procedure: Flexible Cystoscopy with Stent Removal;  Surgeon: Stuart King MD;  [facility]     DAVINCI LYMPHADENECTOMY N/A 11/13/2018    Procedure: Davinci Lymphadenectomy ;  Surgeon: Weight,  Stuart Blackmon MD;  [facility]     DAVINCI NEPHROURETERECTOMY N/A 11/13/2018    Procedure: Right DaVinci Assisted Nephroureterectomy;  Surgeon: Stuart King MD;  [facility]     ENDOSCOPIC ULTRASOUND LOWER GASTROINTESTIONAL TRACT (GI) N/A 10/30/2015    Procedure: ENDOSCOPIC ULTRASOUND LOWER GASTROINTESTIONAL TRACT (GI);  Surgeon: Daniel Jean-Baptiste MD;  [facility]     EYE SURGERY  12/4/17     INSERT PORT VASCULAR ACCESS Right 12/19/2018    Procedure: Chest Port Placement - right;  Surgeon: Stuart Chavez PA-C;  [facility]     IR CHEST PORT PLACEMENT > 5 YRS OF AGE  12/19/2018     IR PORT REMOVAL RIGHT  6/26/2019     LAPAROSCOPIC CHOLECYSTECTOMY WITH CHOLANGIOGRAMS N/A 11/1/2015    Procedure: LAPAROSCOPIC CHOLECYSTECTOMY WITH CHOLANGIOGRAMS;  Surgeon: Tonie Warren MD;  [facility]     REMOVE PORT VASCULAR ACCESS Right 6/26/2019    Procedure: Right Port Removal;  Surgeon: Froilan Irizarry PA-C;  [facility]     SURGICAL HISTORY OF -   1996    malignant melanoma     SURGICAL HISTORY OF -   1968    thyroid nodule     SURGICAL HISTORY OF -       D & C     ZZC NONSPECIFIC PROCEDURE  2005    colonoscopy polyp repeat 2010     cephALEXin (KEFLEX) 500 MG capsule  alendronate (FOSAMAX) 70 MG tablet  diltiazem ER (DILT-XR) 180 MG 24 hr capsule  ferrous sulfate 325 (65 Fe) MG TBEC EC tablet  furosemide (LASIX) 20 MG tablet  irbesartan (AVAPRO) 300 MG tablet  lovastatin (MEVACOR) 40 MG tablet  methimazole (TAPAZOLE) 5 MG tablet  Omega-3 Fatty Acids (FISH OIL) 500 MG CAPS  [START ON 6/2/2022] omeprazole (PRILOSEC) 20 MG DR capsule  propranolol ER (INDERAL LA) 60 MG 24 hr capsule  RESTASIS 0.05 % ophthalmic emulsion  rivaroxaban ANTICOAGULANT (XARELTO) 10 MG TABS tablet  rOPINIRole (REQUIP) 0.25 MG tablet  sertraline (ZOLOFT) 100 MG tablet  traZODone (DESYREL) 50 MG tablet      Allergies   Allergen Reactions     Codeine      Family History  Family History    Problem Relation Age of Onset     Cancer Father         dec - esophageal and laryngeal     Heart Disease Mother      Respiratory Mother         dec     Breast Cancer Daughter      Other Cancer Daughter      Thyroid Disease Daughter      Asthma Daughter      Hyperlipidemia Son      Diabetes Son      Anesthesia Reaction No family hx of      Deep Vein Thrombosis (DVT) No family hx of      Social History   Social History     Tobacco Use     Smoking status: Never Smoker     Smokeless tobacco: Never Used   Vaping Use     Vaping Use: Never used   Substance Use Topics     Alcohol use: No     Alcohol/week: 0.0 standard drinks     Comment: rare     Drug use: No      Past medical history, past surgical history, medications, allergies, family history, and social history were reviewed with the patient. No additional pertinent items.       Review of Systems   Constitutional: Negative for chills, fatigue and fever.   HENT: Positive for nosebleeds and postnasal drip (From epistaxis). Negative for congestion, drooling, ear discharge, ear pain, facial swelling, hearing loss, rhinorrhea, sinus pressure, sinus pain, sneezing, sore throat, trouble swallowing and voice change.    Eyes: Negative for photophobia, pain, redness and visual disturbance.   Respiratory: Negative for cough and shortness of breath.    Cardiovascular: Negative for chest pain and palpitations.   Gastrointestinal: Negative for abdominal pain, constipation, diarrhea, nausea and vomiting.   Genitourinary: Negative for dysuria, flank pain, frequency, hematuria, vaginal bleeding and vaginal discharge.   Musculoskeletal: Negative for back pain and neck pain.   Skin: Negative for color change, rash and wound.   Allergic/Immunologic: Negative for immunocompromised state.   Neurological: Negative for syncope, weakness, light-headedness, numbness and headaches.   Hematological: Bruises/bleeds easily.   Psychiatric/Behavioral: Negative for confusion.   All other systems  "reviewed and are negative.    A complete review of systems was performed with pertinent positives and negatives noted in the HPI, and all other systems negative.    Physical Exam   BP: (!) 175/92  Pulse: 113  Temp: 97.9  F (36.6  C)  Resp: 20  Height: 144.8 cm (4' 9\")  Weight: 76.7 kg (169 lb)  SpO2: 93 %     Physical Exam  Vitals and nursing note reviewed.   Constitutional:       General: She is not in acute distress.     Appearance: She is normal weight. She is not ill-appearing, toxic-appearing or diaphoretic.   HENT:      Head: Normocephalic and atraumatic.      Right Ear: External ear normal.      Left Ear: External ear normal.      Nose: No nasal deformity, septal deviation, laceration or nasal tenderness.      Right Nostril: No epistaxis, septal hematoma or occlusion.      Left Nostril: Epistaxis (Continuous dripping of blood from the left nare) present. No septal hematoma or occlusion.      Mouth/Throat:      Mouth: Mucous membranes are moist.   Eyes:      Extraocular Movements: Extraocular movements intact.      Conjunctiva/sclera: Conjunctivae normal.      Pupils: Pupils are equal, round, and reactive to light.   Cardiovascular:      Rate and Rhythm: Regular rhythm. Tachycardia present.      Pulses: Normal pulses.      Heart sounds: Normal heart sounds. No murmur heard.    No friction rub. No gallop.   Pulmonary:      Effort: Pulmonary effort is normal. No respiratory distress.      Breath sounds: Normal breath sounds. No stridor. No wheezing, rhonchi or rales.   Abdominal:      General: Bowel sounds are normal. There is no distension.      Palpations: Abdomen is soft.      Tenderness: There is no abdominal tenderness. There is no right CVA tenderness, left CVA tenderness, guarding or rebound.   Musculoskeletal:         General: Normal range of motion.      Cervical back: Normal range of motion and neck supple. No rigidity or tenderness.      Right lower leg: No edema.      Left lower leg: No edema. "   Skin:     General: Skin is warm.      Capillary Refill: Capillary refill takes less than 2 seconds.   Neurological:      General: No focal deficit present.      Mental Status: She is alert.   Psychiatric:         Mood and Affect: Mood normal.         Behavior: Behavior normal.         ED Course     ED Course as of 06/01/22 0137   Wed Jun 01, 2022   0003 WBC(!): 11.2   0003 Hemoglobin: 12.8   0003 Platelet Count: 236   0003 Sodium: 139   0003 Potassium: 3.7   0019 Glucose(!): 130   0019 Creatinine: 0.99   0042 INR(!): 1.90  Patient on Xarelto   0042 PTT(!): 42  Patient on Xarelto   0056 Up to the patient's son over the phone.     [facility]    -Epistaxis Mgmt    Date/Time: 6/1/2022 1:39 AM  Performed by: Mily Montes MD  Authorized by: Mily Montes MD     Risks, benefits and alternatives discussed.      ANESTHESIA (see MAR for exact dosages)     Anesthesia method:  None  PROCEDURE DETAILS     Treatment site:  L anterior    Repair method: Rhinorocket.    Treatment complexity:  Limited    Treatment episode: recurrence of recent bleed        POST PROCEDURE     Assessment:  Bleeding stopped    PROCEDURE    Patient Tolerance:  Patient tolerated the procedure well with no immediate complications         The medical record was reviewed and interpreted.  Current labs reviewed and interpreted.  Previous labs reviewed and interpreted.  Managed outpatient prescription medications.    Medications   phenylephrine (ALEXANDER-SYNEPHRINE) 0.25 % spray 1 spray (1 spray Nasal Given 5/31/22 2330)   tranexamic acid (CYKLOKAPRON) TOPICAL (injection used topically) (1,000 mg Topical Given 5/31/22 2331)   cephALEXin (KEFLEX) capsule 500 mg (500 mg Oral Given 6/1/22 0127)        Assessments & Plan (with Medical Decision Making)   Nursing notes and vital signs reviewed.     Presentation, exam, and workup is most consistent with epistaxis.    Please see HPI, ROS, exam, and ED course above for pertinent  history and findings. In short, the patient presented to the ED for evaluation of epistaxis from the left nare, anticoagulated for history of atrial fibrillation.    Initially had continuous dripping of blood from the left nare, phenylephrine spray was placed in the bilateral nares with pressure without resolution.  Rhino Rocket was placed in the left nare with resolution of bleeding, mild residual spotting of blood, not significant and not requiring any further packing.  INR 1.9 and PTT 42, no indication at this time to discontinue her anticoagulation given resolution of epistaxis and risk of clotting due to her underlying history of atrial fibrillation if this is discontinued.  However, this could certainly be a discussion with her, her PCP, ENT, and her cardiologist on an outpatient basis.  Spoke with ENT, they recommended follow-up with them in clinic in 3 to 5 days, they recommended starting Keflex for prophylaxis.  Patient was given initial dose of Keflex here, sent home with 7-day prescription.  Referral to ENT was placed, I discussed with the patient and her son the importance of following up with ENT or her primary care physician if unable to get into ENT by this Friday, he voiced understanding agreement this plan.    On re-evaluation, the patient is resting comfortably after the above interventions. I discussed with the patient the results of the above studies and procedures and she will be discharged to home with a prescription for Keflex. All questions were answered prior to discharge, and the patient was told to follow up with ENT and her PCP per discharge instructions. Reasons for return as well as follow up were reviewed with the patient.  The patient expressed understanding and agreement.    Disposition: The patient was discharged to home in stable condition.      New Prescriptions    CEPHALEXIN (KEFLEX) 500 MG CAPSULE    Take 1 capsule (500 mg) by mouth 2 times daily       Final diagnoses:    Epistaxis       --  Mily Montes MD   [facility] EMERGENCY DEPARTMENT  5/31/2022     Mily Montes MD  06/01/22 0146

## 2022-06-01 NOTE — DISCHARGE INSTRUCTIONS
You have been referred to the ENT physician group, the clinical  should call you to follow-up in 3 to 5 days.  Keep the packing in place until this follow-up appointment.

## 2022-06-01 NOTE — TELEPHONE ENCOUNTER
There is not an appointment in the needed time frame for rhino rocket removal. Please review for scheduling needs.    Thank you

## 2022-06-01 NOTE — ED NOTES
Called pt's son Geoff at patients request to update him that she is in the ER being treated for epistaxis. Will call and update with disposition once available.

## 2022-06-02 ENCOUNTER — PATIENT OUTREACH (OUTPATIENT)
Dept: CARE COORDINATION | Facility: CLINIC | Age: 87
End: 2022-06-02
Payer: MEDICARE

## 2022-06-02 ENCOUNTER — PRE VISIT (OUTPATIENT)
Dept: UROLOGY | Facility: CLINIC | Age: 87
End: 2022-06-02
Payer: MEDICARE

## 2022-06-02 DIAGNOSIS — Z71.89 OTHER SPECIFIED COUNSELING: ICD-10-CM

## 2022-06-02 NOTE — PROGRESS NOTES
Clinic Care Coordination Contact  [facility]/Voicemail       Clinical Data: Care Coordinator Outreach  Outreach attempted x 2.  Left message on patient's voicemail with call back information and requested return call.  Plan: Care Coordinator will try to reach patient again in 1-2 business days.    ELIZABETH Leroy  [phone]  [facility]      Clinic Care Coordination Contact  [facility]/Voicemail       Clinical Data: Care Coordinator Outreach  Outreach attempted x 1.  Is expecting a call regarding an appt; asked me to call back tomorrow.  Plan: Care Coordinator will try to reach patient again in 1-2 business days.    ELIZABETH Leroy  [phone]  [facility]

## 2022-06-02 NOTE — TELEPHONE ENCOUNTER
Reason for visit: follow up cystoscopy     Relevant information: urothelial cancer    Records/imaging/labs/orders: in epic    Pt called: no    At Rooming: urine

## 2022-06-03 RX ORDER — METHIMAZOLE 5 MG/1
TABLET ORAL
Qty: 30 TABLET | Refills: 1 | Status: SHIPPED | OUTPATIENT
Start: 2022-06-03 | End: 2022-08-31

## 2022-06-03 NOTE — TELEPHONE ENCOUNTER
methimazole (TAPAZOLE) 5 MG tablet  Take 5 mg alternating with 2.5 mg every other day      Last Written Prescription Date:  1/4/22  Last Fill Quantity: 90,   # refills: 1  Last Office Visit : 5/31/22  Future Office visit:  6/13/23    Routing refill request to provider for review/approval because:  Drug not on the [facility], P or [facility] refill protocol or controlled substance

## 2022-06-06 ENCOUNTER — OFFICE VISIT (OUTPATIENT)
Dept: OTOLARYNGOLOGY | Facility: CLINIC | Age: 87
End: 2022-06-06

## 2022-06-06 ENCOUNTER — OFFICE VISIT (OUTPATIENT)
Dept: OTOLARYNGOLOGY | Facility: CLINIC | Age: 87
End: 2022-06-06
Payer: MEDICARE

## 2022-06-06 VITALS — HEIGHT: 57 IN | WEIGHT: 169 LBS | BODY MASS INDEX: 36.46 KG/M2

## 2022-06-06 VITALS
TEMPERATURE: 98 F | BODY MASS INDEX: 36.46 KG/M2 | HEIGHT: 57 IN | DIASTOLIC BLOOD PRESSURE: 100 MMHG | SYSTOLIC BLOOD PRESSURE: 160 MMHG | OXYGEN SATURATION: 96 % | HEART RATE: 80 BPM | WEIGHT: 169 LBS

## 2022-06-06 DIAGNOSIS — R04.0 EPISTAXIS: Primary | ICD-10-CM

## 2022-06-06 DIAGNOSIS — I78.0 HEREDITARY EPISTAXIS (H): Primary | ICD-10-CM

## 2022-06-06 PROCEDURE — 99207 PR NO CHARGE LOS: CPT | Performed by: OTOLARYNGOLOGY

## 2022-06-06 PROCEDURE — 31238 NSL/SINS NDSC SRG NSL HEMRRG: CPT | Mod: 50 | Performed by: OTOLARYNGOLOGY

## 2022-06-06 PROCEDURE — 99207 PR NO CHARGE LOS: CPT | Performed by: REGISTERED NURSE

## 2022-06-06 ASSESSMENT — PAIN SCALES - GENERAL
PAINLEVEL: NO PAIN (0)
PAINLEVEL: NO PAIN (0)

## 2022-06-06 NOTE — LETTER
6/6/2022       RE: Dimple Oviedo  [address]  [place] [address]     Dear Colleague,    Thank you for referring your patient, Dimple Oviedo, to the [facility] [place] [facility]. Please see a copy of my visit note below.      Patient presents with recurrent epistaxis. Please see note today from Nicolasa Hook. Risks and benefits of sclerotherapy have been explained and the patient wishes to proceed. Signed consent is obtained.  Procedure: The nose is anesthetized topically with lidocaine and phenylephrine solution.  Visualization with a zero degree rigid endoscope is used for the procedure. A 25-gauge butterfly needle is inserted into the telangiectasia on the left septum and sodium tetradecyl sulfate 3% foamed with air 1:4 is injected until blanching of the lesion is seen. Minimal bleeding is encountered and no packing is necessary. I did apply a piece of surgicel to protect area as she heals. No immediate complications are seen.  The patient will contact us if bleeding is significant enough to consider additional procedures.        Again, thank you for allowing me to participate in the care of your patient.      Sincerely,    Adeline Chaney MD

## 2022-06-06 NOTE — PATIENT INSTRUCTIONS
1. Please follow-up in clinic as needed for nose bleeds  2. Please call the ENT clinic with any questions,concerns, new or worsening symptoms.    -Clinic number is [phone]   - Kati's direct line (Dr. Chaney's nurse) [phone]

## 2022-06-06 NOTE — NURSING NOTE
"Chief Complaint   Patient presents with     Procedure     HHT    Blood pressure (!) 160/100, pulse 80, temperature 98  F (36.7  C), temperature source Temporal, height 1.448 m (4' 9\"), weight 76.7 kg (169 lb), SpO2 96 %, not currently breastfeeding. Ambar Peterson, EMT  "

## 2022-06-06 NOTE — LETTER
6/6/2022       RE: Dimple Oviedo  [address]  [facility] [address]     Dear Colleague,    Thank you for referring your patient, Dimple Oviedo, to the [facility] EAR NOSE AND TH[facility]AT [facility]INIC [place] [facility]. Please see a copy of my visit note below.    June 6, 2022    Prior Medical History: Dimple Oviedo is a 88 year old female with a history of chronic epistaxis. Patient has a history of Afib, on Xarelto. Reports daily nosebleeds when bending over or bearing down. Can typically stop nosebleeds with pressure but has required nasal packing in the past.     Recent ED visit due to epistaxis on 6/1/22. Rhinorocket placed in left nare with instructions to follow up for removal in 3-5 days.     Interval History:   Patient comes in today for packing removal. Denies any bleeding from the nose, may be tasting blood running in the back of the throat. Very difficult to breath through nose. Believes that paramedics put nasal packing in right nasal cavity as well during the ambulance visit to ED. Reports pressure in the right posterior nasal cavity. Has not been using saline in left nare.    Past Medical History:  Past Medical History:   Diagnosis Date     CRESENCIO (acute kidney injury) (H) 9/11/2018     Arthritis      Asthma 2/9/2022     Calculus of kidney      Chemotherapy-induced neutropenia (H) 3/6/2019     Congestive heart failure (H)      Esophageal reflux      GERD (gastroesophageal reflux disease)      Hyperlipidemia LDL goal <130 5/9/2010     Malignant melanoma of skin of trunk, except scrotum (H)      Nonspecific abnormal finding     has living will 2004 -      Nontoxic multinodular goiter     no further eval /tx rec per pt     Osteopenia      Other psoriasis      Personal history of colonic polyps      PMR (polymyalgia rheumatica) (H)      Restless legs syndrome 7/11/2018     Stress-induced cardiomyopathy      Undiagnosed cardiac murmurs       Unspecified constipation      Unspecified essential hypertension      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       Past Surgical History:  Past Surgical History:   Procedure Laterality Date     ARTHROPLASTY KNEE Right 11/9/2020    Procedure: ARTHROPLASTY, KNEE, TOTAL, RIGHT;  Surgeon: Edward Otero MD;  [facility]     BIOPSY       COLONOSCOPY  2014     COMBINED CYSTOSCOPY, INSERT STENT URETER(S) Bilateral 9/12/2018    Procedure: COMBINED CYSTOSCOPY, INSERT STENT URETER(S);  Cystoscopy Bilateral ureteral Stent Placement.;  Surgeon: Justin Henry MD;  [facility]     COMBINED CYSTOSCOPY, RETROGRADES, URETEROSCOPY, INSERT STENT Bilateral 4/3/2018    Procedure: COMBINED CYSTOSCOPY, RETROGRADES, URETEROSCOPY, INSERT STENT;;  Surgeon: Stuart King MD;  [facility]     COMBINED CYSTOSCOPY, RETROGRADES, URETEROSCOPY, INSERT STENT Bilateral 9/10/2018    Procedure: COMBINED CYSTOSCOPY, RETROGRADES, URETEROSCOPY, INSERT STENT;  Cystoscopy, Bilateral Ureteroscopy, Bladder Biopsies, Retrogram Pyelograms, Ureteral Washings and brushings, cysview;  Surgeon: Stuart King MD;  [facility]     COMBINED CYSTOSCOPY, RETROGRADES, URETEROSCOPY, INSERT STENT Left 8/26/2020    Procedure: Cystoscopy, Left Ureteral Wash, Left ureteral Retrograde Pyelogram;  Surgeon: Justin Henry MD;  [facility]     CYSTOSCOPY, BIOPSY BLADDER INSTILL OPTICAL AGENT N/A 4/3/2018    Procedure: CYSTOSCOPY, BIOPSY BLADDER INSTILL OPTICAL AGENT;  Cystoscopy, Blue Light Cystoscopy, Bladder Biopsies, Bilateral Selective ureteral washings for Cytology, Bilateral Retrograde Pyelograms, Bilateral Ureteroscopy;  Surgeon: Stuart King MD;  [facility]     CYSTOSCOPY, BIOPSY BLADDER, COMBINED N/A 2/19/2018    Procedure: COMBINED CYSTOSCOPY, BIOPSY BLADDER;  Cystoscopy, Bladder Biopsy;  Surgeon: Kenna La MD;  [facility]     CYSTOSCOPY, BIOPSY BLADDER, COMBINED N/A  8/26/2020    Procedure: Cystoscopy, biopsy bladder, combined;  Surgeon: Justin Henry MD;  [facility]     CYSTOSCOPY, FULGURATE BLADDER TUMOR, COMBINED N/A 1/13/2020    Procedure: CYSTOSCOPY, WITH  bladder biopsies and fulguration;  Surgeon: Stuart King MD;  [facility]     CYSTOSCOPY, REMOVE STENT(S), COMBINED  11/13/2018    Procedure: Flexible Cystoscopy with Stent Removal;  Surgeon: Stuart King MD;  [facility]     DAVINCI LYMPHADENECTOMY N/A 11/13/2018    Procedure: Davinci Lymphadenectomy ;  Surgeon: Stuart King MD;  [facility]     DAVINCI NEPHROURETERECTOMY N/A 11/13/2018    Procedure: Right DaVinci Assisted Nephroureterectomy;  Surgeon: Stuart King MD;  [facility]     ENDOSCOPIC ULTRASOUND LOWER GASTROINTESTIONAL TRACT (GI) N/A 10/30/2015    Procedure: ENDOSCOPIC ULTRASOUND LOWER GASTROINTESTIONAL TRACT (GI);  Surgeon: Daniel Jean-Baptiste MD;  [facility]     EYE SURGERY  12/4/17     INSERT PORT VASCULAR ACCESS Right 12/19/2018    Procedure: Chest Port Placement - right;  Surgeon: Stuart Chavez PA-C;  [facility]     IR CHEST PORT PLACEMENT > 5 YRS OF AGE  12/19/2018     IR PORT REMOVAL RIGHT  6/26/2019     LAPAROSCOPIC CHOLECYSTECTOMY WITH CHOLANGIOGRAMS N/A 11/1/2015    Procedure: LAPAROSCOPIC CHOLECYSTECTOMY WITH CHOLANGIOGRAMS;  Surgeon: Tonie Warren MD;  [facility]     REMOVE PORT VASCULAR ACCESS Right 6/26/2019    Procedure: Right Port Removal;  Surgeon: Froilan Irizarry PA-C;  [facility]     SURGICAL HISTORY OF -   1996    malignant melanoma     SURGICAL HISTORY OF -   1968    thyroid nodule     SURGICAL HISTORY OF -       D & C     ZZC NONSPECIFIC PROCEDURE  2005    colonoscopy polyp repeat 2010       Medications:  Current Outpatient Medications   Medication Sig Dispense Refill     alendronate (FOSAMAX) 70 MG tablet TAKE 1 TABLET EVERY 7 DAYS AT LEAST 60 MINUTES BEFORE  "BREAKFAST AS DIRECTED 12 tablet 2     cephALEXin (KEFLEX) 500 MG capsule Take 1 capsule (500 mg) by mouth 2 times daily 14 capsule 0     diltiazem ER (DILT-XR) 180 MG 24 hr capsule Take 1 capsule (180 mg) by mouth daily 90 capsule 0     ferrous sulfate 325 (65 Fe) MG TBEC EC tablet Take 325 mg by mouth every morning        furosemide (LASIX) 20 MG tablet TAKE 1 TABLET TWICE DAILY IN THE MORNING AND AFTER LUNCH 180 tablet 1     irbesartan (AVAPRO) 300 MG tablet TAKE 1 TABLET EVERY DAY 90 tablet 2     lovastatin (MEVACOR) 40 MG tablet Take 1 tablet (40 mg) by mouth At Bedtime 90 tablet 3     methimazole (TAPAZOLE) 5 MG tablet TAKE 1 TABLET ALTERNATING WITH 1/2 TABLET EVERY OTHER DAY 30 tablet 1     Omega-3 Fatty Acids (FISH OIL) 500 MG CAPS Take 1 capsule by mouth every morning        omeprazole (PRILOSEC) 20 MG DR capsule TAKE 1 CAPSULE EVERY DAY 90 capsule 1     propranolol ER (INDERAL LA) 60 MG 24 hr capsule TAKE 1 CAPSULE EVERY DAY 90 capsule 3     RESTASIS 0.05 % ophthalmic emulsion        rivaroxaban ANTICOAGULANT (XARELTO) 10 MG TABS tablet Take 1 tablet (10 mg) by mouth daily (with dinner) 90 tablet 1     rOPINIRole (REQUIP) 0.25 MG tablet Take 2 tablets (0.5 mg) by mouth At Bedtime 180 tablet 0     sertraline (ZOLOFT) 100 MG tablet TAKE 1 TABLET EVERY MORNING 90 tablet 3     traZODone (DESYREL) 50 MG tablet TAKE 1/2 TABLET AT BEDTIME 45 tablet 3       Allergies:  Allergies   Allergen Reactions     Codeine         Social History:  Social History     Tobacco Use     Smoking status: Never Smoker     Smokeless tobacco: Never Used   Vaping Use     Vaping Use: Never used   Substance Use Topics     Alcohol use: No     Alcohol/week: 0.0 standard drinks     Comment: rare     Drug use: No     ROS: 10 point ROS neg other than the symptoms noted above in the HPI.    Physical Exam:    Ht 1.448 m (4' 9\")   Wt 76.7 kg (169 lb)   BMI 36.57 kg/m    Wt Readings from Last 3 Encounters:   06/06/22 76.7 kg (169 lb)   06/06/22 " 76.7 kg (169 lb)   05/31/22 76.7 kg (169 lb)        Constitutional:  The patient was unaccompanied, well-groomed, and in no acute distress.     Neurologic: Alert and oriented x 3. Voice normal   Psychiatric: The patient's affect was calm, cooperative, and appropriate.    Communication:  Normal; communicates verbally, normal voice quality.   Respiratory: Breathing comfortably without stridor or exertion of accessory muscles.   Head/Face:  Normocephalic and atraumatic.  No lesions or scars.    Oral Cavity: Normal tongue, floor of mouth, buccal mucosa, and palate.  No lesions or masses on inspection.    Oropharynx: Normal mucosa, palate symmetric with normal elevation. No bleeding visualized in the oropharynx.   Hypopharynx: Mirror exam shows absence of pooled secretions and normal  pyriform sinus and pharyngeal wall mucosa.      Procedure: Removal of RhinoRocket. Packing was sprayed with a copious amount of topical anesthetic and vasoconstrictive solution. Balloon was completely deflated. Packing was gently removed by Dr. Chaney. 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     Right side assessed by Dr. Chaney. Packing from posterior nasal cavity removed with bayonet. No evidence of bleeding.      Assessment/Plan:  1. Epistaxis  Patient with chronic epistaxis. Packing removed from left nostril to reveal a telangectasia that is likely the culprit for patient's frequent nosebleeds. Dr. Chaney discussed performing a HHT procedure later this afternoon to treat this telangectasia. Patient is in agreement to plan and will return for this procedure later this afternoon.      Otherwise, patient will follow up as needed in clinic for further management of epistaxis.     Nicolasa Hook DNP, APRN, CNP  Otolaryngology  Head & Neck Surgery  [phone]    20 minutes spent on the date of the encounter doing chart review, history and exam, documentation and further activities per the note excluding time  performing nasal packing removal.

## 2022-06-06 NOTE — PROGRESS NOTES
June 6, 2022    Prior Medical History: Dimple Oviedo is a 88 year old female with a history of chronic epistaxis. Patient has a history of Afib, on Xarelto. Reports daily nosebleeds when bending over or bearing down. Can typically stop nosebleeds with pressure but has required nasal packing in the past.     Recent ED visit due to epistaxis on 6/1/22. Rhinorocket placed in left nare with instructions to follow up for removal in 3-5 days.     Interval History:   Patient comes in today for packing removal. Denies any bleeding from the nose, may be tasting blood running in the back of the throat. Very difficult to breath through nose. Believes that paramedics put nasal packing in right nasal cavity as well during the ambulance visit to ED. Reports pressure in the right posterior nasal cavity. Has not been using saline in left nare.    Past Medical History:  Past Medical History:   Diagnosis Date     CRESENCIO (acute kidney injury) (H) 9/11/2018     Arthritis      Asthma 2/9/2022     Calculus of kidney      Chemotherapy-induced neutropenia (H) 3/6/2019     Congestive heart failure (H)      Esophageal reflux      GERD (gastroesophageal reflux disease)      Hyperlipidemia LDL goal <130 5/9/2010     Malignant melanoma of skin of trunk, except scrotum (H)      Nonspecific abnormal finding     has living will 2004 -      Nontoxic multinodular goiter     no further eval /tx rec per pt     Osteopenia      Other psoriasis      Personal history of colonic polyps      PMR (polymyalgia rheumatica) (H)      Restless legs syndrome 7/11/2018     Stress-induced cardiomyopathy      Undiagnosed cardiac murmurs      Unspecified constipation      Unspecified essential hypertension      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       Past Surgical History:  Past Surgical History:   Procedure Laterality Date     ARTHROPLASTY KNEE Right 11/9/2020    Procedure: ARTHROPLASTY, KNEE, TOTAL, RIGHT;  Surgeon: Edward Otero MD;  [facility]      BIOPSY       COLONOSCOPY  2014     COMBINED CYSTOSCOPY, INSERT STENT URETER(S) Bilateral 9/12/2018    Procedure: COMBINED CYSTOSCOPY, INSERT STENT URETER(S);  Cystoscopy Bilateral ureteral Stent Placement.;  Surgeon: Justin Henry MD;  [facility]     COMBINED CYSTOSCOPY, RETROGRADES, URETEROSCOPY, INSERT STENT Bilateral 4/3/2018    Procedure: COMBINED CYSTOSCOPY, RETROGRADES, URETEROSCOPY, INSERT STENT;;  Surgeon: Stuart King MD;  [facility]     COMBINED CYSTOSCOPY, RETROGRADES, URETEROSCOPY, INSERT STENT Bilateral 9/10/2018    Procedure: COMBINED CYSTOSCOPY, RETROGRADES, URETEROSCOPY, INSERT STENT;  Cystoscopy, Bilateral Ureteroscopy, Bladder Biopsies, Retrogram Pyelograms, Ureteral Washings and brushings, cysview;  Surgeon: Stuart King MD;  [facility]     COMBINED CYSTOSCOPY, RETROGRADES, URETEROSCOPY, INSERT STENT Left 8/26/2020    Procedure: Cystoscopy, Left Ureteral Wash, Left ureteral Retrograde Pyelogram;  Surgeon: Justin Henry MD;  [facility]     CYSTOSCOPY, BIOPSY BLADDER INSTILL OPTICAL AGENT N/A 4/3/2018    Procedure: CYSTOSCOPY, BIOPSY BLADDER INSTILL OPTICAL AGENT;  Cystoscopy, Blue Light Cystoscopy, Bladder Biopsies, Bilateral Selective ureteral washings for Cytology, Bilateral Retrograde Pyelograms, Bilateral Ureteroscopy;  Surgeon: Stuart King MD;  [facility]     CYSTOSCOPY, BIOPSY BLADDER, COMBINED N/A 2/19/2018    Procedure: COMBINED CYSTOSCOPY, BIOPSY BLADDER;  Cystoscopy, Bladder Biopsy;  Surgeon: Kenna La MD;  [facility]     CYSTOSCOPY, BIOPSY BLADDER, COMBINED N/A 8/26/2020    Procedure: Cystoscopy, biopsy bladder, combined;  Surgeon: Justin Henry MD;  [facility]     CYSTOSCOPY, FULGURATE BLADDER TUMOR, COMBINED N/A 1/13/2020    Procedure: CYSTOSCOPY, WITH  bladder biopsies and fulguration;  Surgeon: Stuart King MD;  [facility]     CYSTOSCOPY, REMOVE STENT(S),  COMBINED  11/13/2018    Procedure: Flexible Cystoscopy with Stent Removal;  Surgeon: Stuart King MD;  [facility]     DAVINCI LYMPHADENECTOMY N/A 11/13/2018    Procedure: Davinci Lymphadenectomy ;  Surgeon: Stuart King MD;  [facility]     DAVINCI NEPHROURETERECTOMY N/A 11/13/2018    Procedure: Right DaVinci Assisted Nephroureterectomy;  Surgeon: Stuart King MD;  [facility]     ENDOSCOPIC ULTRASOUND LOWER GASTROINTESTIONAL TRACT (GI) N/A 10/30/2015    Procedure: ENDOSCOPIC ULTRASOUND LOWER GASTROINTESTIONAL TRACT (GI);  Surgeon: Daniel Jean-Baptiste MD;  [facility]     EYE SURGERY  12/4/17     INSERT PORT VASCULAR ACCESS Right 12/19/2018    Procedure: Chest Port Placement - right;  Surgeon: Stuart Chavez PA-C;  [facility]     IR CHEST PORT PLACEMENT > 5 YRS OF AGE  12/19/2018     IR PORT REMOVAL RIGHT  6/26/2019     LAPAROSCOPIC CHOLECYSTECTOMY WITH CHOLANGIOGRAMS N/A 11/1/2015    Procedure: LAPAROSCOPIC CHOLECYSTECTOMY WITH CHOLANGIOGRAMS;  Surgeon: Tonie Warren MD;  [facility]     REMOVE PORT VASCULAR ACCESS Right 6/26/2019    Procedure: Right Port Removal;  Surgeon: Froilan Irizarry PA-C;  [facility]     SURGICAL HISTORY OF -   1996    malignant melanoma     SURGICAL HISTORY OF -   1968    thyroid nodule     SURGICAL HISTORY OF -       D & C     ZZC NONSPECIFIC PROCEDURE  2005    colonoscopy polyp repeat 2010       Medications:  Current Outpatient Medications   Medication Sig Dispense Refill     alendronate (FOSAMAX) 70 MG tablet TAKE 1 TABLET EVERY 7 DAYS AT LEAST 60 MINUTES BEFORE BREAKFAST AS DIRECTED 12 tablet 2     cephALEXin (KEFLEX) 500 MG capsule Take 1 capsule (500 mg) by mouth 2 times daily 14 capsule 0     diltiazem ER (DILT-XR) 180 MG 24 hr capsule Take 1 capsule (180 mg) by mouth daily 90 capsule 0     ferrous sulfate 325 (65 Fe) MG TBEC EC tablet Take 325 mg by mouth every morning        furosemide  "(LASIX) 20 MG tablet TAKE 1 TABLET TWICE DAILY IN THE MORNING AND AFTER LUNCH 180 tablet 1     irbesartan (AVAPRO) 300 MG tablet TAKE 1 TABLET EVERY DAY 90 tablet 2     lovastatin (MEVACOR) 40 MG tablet Take 1 tablet (40 mg) by mouth At Bedtime 90 tablet 3     methimazole (TAPAZOLE) 5 MG tablet TAKE 1 TABLET ALTERNATING WITH 1/2 TABLET EVERY OTHER DAY 30 tablet 1     Omega-3 Fatty Acids (FISH OIL) 500 MG CAPS Take 1 capsule by mouth every morning        omeprazole (PRILOSEC) 20 MG DR capsule TAKE 1 CAPSULE EVERY DAY 90 capsule 1     propranolol ER (INDERAL LA) 60 MG 24 hr capsule TAKE 1 CAPSULE EVERY DAY 90 capsule 3     RESTASIS 0.05 % ophthalmic emulsion        rivaroxaban ANTICOAGULANT (XARELTO) 10 MG TABS tablet Take 1 tablet (10 mg) by mouth daily (with dinner) 90 tablet 1     rOPINIRole (REQUIP) 0.25 MG tablet Take 2 tablets (0.5 mg) by mouth At Bedtime 180 tablet 0     sertraline (ZOLOFT) 100 MG tablet TAKE 1 TABLET EVERY MORNING 90 tablet 3     traZODone (DESYREL) 50 MG tablet TAKE 1/2 TABLET AT BEDTIME 45 tablet 3       Allergies:  Allergies   Allergen Reactions     Codeine         Social History:  Social History     Tobacco Use     Smoking status: Never Smoker     Smokeless tobacco: Never Used   Vaping Use     Vaping Use: Never used   Substance Use Topics     Alcohol use: No     Alcohol/week: 0.0 standard drinks     Comment: rare     Drug use: No     ROS: 10 point ROS neg other than the symptoms noted above in the HPI.    Physical Exam:    Ht 1.448 m (4' 9\")   Wt 76.7 kg (169 lb)   BMI 36.57 kg/m    Wt Readings from Last 3 Encounters:   06/06/22 76.7 kg (169 lb)   06/06/22 76.7 kg (169 lb)   05/31/22 76.7 kg (169 lb)        Constitutional:  The patient was unaccompanied, well-groomed, and in no acute distress.     Neurologic: Alert and oriented x 3. Voice normal   Psychiatric: The patient's affect was calm, cooperative, and appropriate.    Communication:  Normal; communicates verbally, normal voice " quality.   Respiratory: Breathing comfortably without stridor or exertion of accessory muscles.   Head/Face:  Normocephalic and atraumatic.  No lesions or scars.    Oral Cavity: Normal tongue, floor of mouth, buccal mucosa, and palate.  No lesions or masses on inspection.    Oropharynx: Normal mucosa, palate symmetric with normal elevation. No bleeding visualized in the oropharynx.   Hypopharynx: Mirror exam shows absence of pooled secretions and normal  pyriform sinus and pharyngeal wall mucosa.      Procedure: Removal of RhinoRocket. Packing was sprayed with a copious amount of topical anesthetic and vasoconstrictive solution. Balloon was completely deflated. Packing was gently removed by Dr. Chaney. 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     Right side assessed by Dr. Chaney. Packing from posterior nasal cavity removed with bayonet. No evidence of bleeding.      Assessment/Plan:  1. Epistaxis  Patient with chronic epistaxis. Packing removed from left nostril to reveal a telangectasia that is likely the culprit for patient's frequent nosebleeds. Dr. Chaney discussed performing a HHT procedure later this afternoon to treat this telangectasia. Patient is in agreement to plan and will return for this procedure later this afternoon.      Otherwise, patient will follow up as needed in clinic for further management of epistaxis.     Nicolasa Hook DNP, APRN, CNP  Otolaryngology  Head & Neck Surgery  [phone]    20 minutes spent on the date of the encounter doing chart review, history and exam, documentation and further activities per the note excluding time performing nasal packing removal.

## 2022-06-07 DIAGNOSIS — I10 ESSENTIAL HYPERTENSION WITH GOAL BLOOD PRESSURE LESS THAN 140/90: ICD-10-CM

## 2022-06-07 RX ORDER — SODIUM TETRADECYL SULFATE 30 MG/ML
2 INJECTION, SOLUTION INTRAVENOUS ONCE
Status: COMPLETED | OUTPATIENT
Start: 2022-06-06 | End: 2022-06-07

## 2022-06-07 RX ADMIN — SODIUM TETRADECYL SULFATE 2 ML: 30 INJECTION, SOLUTION INTRAVENOUS at 09:32

## 2022-06-07 NOTE — PROGRESS NOTES
Patient presents with recurrent epistaxis. Please see note today from Nicolasa Hook. Risks and benefits of sclerotherapy have been explained and the patient wishes to proceed. Signed consent is obtained.  Procedure: The nose is anesthetized topically with lidocaine and phenylephrine solution.  Visualization with a zero degree rigid endoscope is used for the procedure. A 25-gauge butterfly needle is inserted into the telangiectasia on the left septum and sodium tetradecyl sulfate 3% foamed with air 1:4 is injected until blanching of the lesion is seen. Minimal bleeding is encountered and no packing is necessary. I did apply a piece of surgicel to protect area as she heals. No immediate complications are seen.  The patient will contact us if bleeding is significant enough to consider additional procedures.

## 2022-06-09 RX ORDER — IRBESARTAN 300 MG/1
TABLET ORAL
Qty: 90 TABLET | Refills: 0 | Status: SHIPPED | OUTPATIENT
Start: 2022-06-09 | End: 2022-10-27

## 2022-06-09 NOTE — TELEPHONE ENCOUNTER
Prescription approved per 1/2422 progress note.  ITALO Mckinney RN  [facility]

## 2022-06-10 ENCOUNTER — OFFICE VISIT (OUTPATIENT)
Dept: UROLOGY | Facility: CLINIC | Age: 87
End: 2022-06-10
Payer: MEDICARE

## 2022-06-10 VITALS — SYSTOLIC BLOOD PRESSURE: 112 MMHG | HEART RATE: 102 BPM | DIASTOLIC BLOOD PRESSURE: 72 MMHG

## 2022-06-10 DIAGNOSIS — C68.9 UROTHELIAL CANCER (H): Primary | ICD-10-CM

## 2022-06-10 DIAGNOSIS — E05.00 GRAVES DISEASE: ICD-10-CM

## 2022-06-10 DIAGNOSIS — C66.1 UROTHELIAL CARCINOMA OF RIGHT DISTAL URETER (H): ICD-10-CM

## 2022-06-10 PROCEDURE — 88120 CYTP URNE 3-5 PROBES EA SPEC: CPT | Mod: TC | Performed by: INTERNAL MEDICINE

## 2022-06-10 PROCEDURE — 88112 CYTOPATH CELL ENHANCE TECH: CPT | Mod: TC | Performed by: INTERNAL MEDICINE

## 2022-06-10 PROCEDURE — 88120 CYTP URNE 3-5 PROBES EA SPEC: CPT | Mod: 26 | Performed by: PATHOLOGY

## 2022-06-10 PROCEDURE — 52000 CYSTOURETHROSCOPY: CPT | Performed by: UROLOGY

## 2022-06-10 PROCEDURE — 99207 PR NO CHARGE LOS: CPT | Performed by: UROLOGY

## 2022-06-10 PROCEDURE — 88112 CYTOPATH CELL ENHANCE TECH: CPT | Mod: 26 | Performed by: PATHOLOGY

## 2022-06-10 ASSESSMENT — PAIN SCALES - GENERAL: PAINLEVEL: NO PAIN (0)

## 2022-06-10 NOTE — LETTER
6/10/2022       RE: Dimple Oviedo  [address]  [facility] [address]     Dear Colleague,    Thank you for referring your patient, Dimple Oviedo, to the [facility] UROLOGY CLINIC [place] at [facility]. Please see a copy of my visit note below.    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 She completed chemotherapy (gem/carbo).     -Continue q3-4 month CT Chest Abdomen and Pelvis with and without contrast with Dr Fine.     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   Got 6 of BCG and tolerated it well.   Negative biopsies 4/2020, 8/26/20.    Plan for 3 weekly maintenance BCG treatments at 3, 6, 12, 18, 24, 30, and 36 months.    12 month maintenance in 5/2021    Follow-up Protocol [facility] High Risk Bladder Cancer  Cystoscopy and Cytology should be done every:  3-4 months for 2 years  6 months for years 3-4  Annually thereafter  Consider upper tract imaging at 1-2 year intervals  2016 [facility] Diagnosis and Treatment of Non-Muscle Invasive Bladder Cancer: AUA/SUO Guideline    Plan  -30 month BCG maintenance over the next few weeks.  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 The patient is following yolanda Toledo of Hematology and Oncology.      Per Dr. Toledo's note, on 12/12/18 Mr. Oviedo- Started adjuvant chemotherapy (carbo+gem) per POUT trial. Cycle 2 - 1/2/19. Cycle 3 - 1/23/19. Cycle 4 - 02/13/19.    Cystoscopy on 10/03/2019 showed small area of erythema. Bladder biopsy from 01/13/2020 showed urothelial carcinoma in-situ , negative  biopsy 5/2020    Intravesical Therapy: BCG X6 Feb 2020 (Full Strength)     She had right total knee arthoplasty 02/17/2020    Dr Fine is doing follow-up as well.    Today she is doing well.      CYSTOSCOPY PROCEDURE:  Sterile preparation and drape and an informed consent were performed.  A 16-Macanese flexible cystoscope was introduced via the urethra.  The urethra was open.  The bladder mucosa showed no evidence of any lesions or trabeculation.  There were no stones.        Justin Henry MD

## 2022-06-10 NOTE — PROGRESS NOTES
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 She completed chemotherapy (gem/carbo).     -Continue q3-4 month CT Chest Abdomen and Pelvis with and without contrast with Dr Fine.     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   Got 6 of BCG and tolerated it well.   Negative biopsies 4/2020, 8/26/20.    Plan for 3 weekly maintenance BCG treatments at 3, 6, 12, 18, 24, 30, and 36 months.    12 month maintenance in 5/2021    Follow-up Protocol [facility] High Risk Bladder Cancer  Cystoscopy and Cytology should be done every:  3-4 months for 2 years  6 months for years 3-4  Annually thereafter  Consider upper tract imaging at 1-2 year intervals  2016 [facility] Diagnosis and Treatment of Non-Muscle Invasive Bladder Cancer: AUA/SUO Guideline    Plan  -30 month BCG maintenance over the next few weeks.  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 The patient is following yolanda Toledo of Hematology and Oncology.      Per Dr. Toledo's note, on 12/12/18 Mr. Oviedo- Started adjuvant chemotherapy (carbo+gem) per POUT trial. Cycle 2 - 1/2/19. Cycle 3 - 1/23/19. Cycle 4 - 02/13/19.    Cystoscopy on 10/03/2019 showed small area of erythema. Bladder biopsy from 01/13/2020 showed urothelial carcinoma in-situ , negative biopsy 5/2020    Intravesical Therapy: BCG X6 Feb 2020 (Full Strength)     She had right total knee arthoplasty 02/17/2020    Dr Fine is doing follow-up as well.    Today she is doing well.      CYSTOSCOPY PROCEDURE:  Sterile preparation and drape and an informed consent were performed.  A 16-Armenian flexible cystoscope was  introduced via the urethra.  The urethra was open.  The bladder mucosa showed no evidence of any lesions or trabeculation.  There were no stones.

## 2022-06-13 ENCOUNTER — TELEPHONE (OUTPATIENT)
Dept: UROLOGY | Facility: CLINIC | Age: 87
End: 2022-06-13
Payer: MEDICARE

## 2022-06-13 NOTE — TELEPHONE ENCOUNTER
[facility] Call Center    Phone Message    May a detailed message be left on voicemail: yes     Reason for Call: Other: Patient needs clarification on if she needs cysto in 3 or 6months. AVS has conflicting information. Patient did schedule in January. Please reach out. Thank you!     Action Taken: Message routed to:  [facility] ([facility] Uro    Travel Screening: Not Applicable

## 2022-06-17 ENCOUNTER — INFUSION THERAPY VISIT (OUTPATIENT)
Dept: ONCOLOGY | Facility: CLINIC | Age: 87
End: 2022-06-17
Attending: UROLOGY
Payer: MEDICARE

## 2022-06-17 VITALS
HEART RATE: 85 BPM | OXYGEN SATURATION: 96 % | SYSTOLIC BLOOD PRESSURE: 137 MMHG | RESPIRATION RATE: 16 BRPM | DIASTOLIC BLOOD PRESSURE: 82 MMHG | TEMPERATURE: 98.2 F

## 2022-06-17 DIAGNOSIS — C68.9 UROTHELIAL CARCINOMA (H): ICD-10-CM

## 2022-06-17 DIAGNOSIS — C68.9 UROTHELIAL CANCER (H): Primary | ICD-10-CM

## 2022-06-17 DIAGNOSIS — C66.1 UROTHELIAL CARCINOMA OF RIGHT DISTAL URETER (H): ICD-10-CM

## 2022-06-17 LAB
ALBUMIN UR-MCNC: NEGATIVE MG/DL
APPEARANCE UR: CLEAR
BILIRUB UR QL STRIP: NEGATIVE
COLOR UR AUTO: NORMAL
GLUCOSE UR STRIP-MCNC: NEGATIVE MG/DL
HGB UR QL STRIP: NEGATIVE
KETONES UR STRIP-MCNC: NEGATIVE MG/DL
LEUKOCYTE ESTERASE UR QL STRIP: NEGATIVE
NITRATE UR QL: NEGATIVE
PH UR STRIP: 6 [PH] (ref 5–7)
SP GR UR STRIP: 1 (ref 1–1.03)
UROBILINOGEN UR STRIP-MCNC: NORMAL MG/DL

## 2022-06-17 PROCEDURE — 250N000011 HC RX IP 250 OP 636: Performed by: UROLOGY

## 2022-06-17 PROCEDURE — 81003 URINALYSIS AUTO W/O SCOPE: CPT | Performed by: UROLOGY

## 2022-06-17 PROCEDURE — 51720 TREATMENT OF BLADDER LESION: CPT

## 2022-06-17 RX ORDER — LIDOCAINE HYDROCHLORIDE 20 MG/ML
10 JELLY TOPICAL
Status: CANCELLED | OUTPATIENT
Start: 2022-06-17

## 2022-06-17 RX ORDER — LIDOCAINE HYDROCHLORIDE 20 MG/ML
10 JELLY TOPICAL
Status: CANCELLED | OUTPATIENT
Start: 2022-06-24

## 2022-06-17 RX ORDER — LEVOFLOXACIN 500 MG/1
TABLET, FILM COATED ORAL
Qty: 6 TABLET | Refills: 11 | Status: SHIPPED | OUTPATIENT
Start: 2022-06-17 | End: 2023-06-29

## 2022-06-17 RX ORDER — LIDOCAINE HYDROCHLORIDE 20 MG/ML
10 JELLY TOPICAL
Status: CANCELLED | OUTPATIENT
Start: 2022-07-01

## 2022-06-17 RX ADMIN — BACILLUS CALMETTE-GUERIN 50 MG: 50 POWDER, FOR SUSPENSION INTRAVESICAL at 14:09

## 2022-06-17 ASSESSMENT — PAIN SCALES - GENERAL: PAINLEVEL: NO PAIN (0)

## 2022-06-17 NOTE — PATIENT INSTRUCTIONS
Home discharge instructions:  -To make sure each treatment has the best chance of working, follow these steps 2 hours after each treatment                        1. Lie on your back for 15 minues                        2. Lie on your left side for 15 mintues                        3. Lie on your right side for 15 minutes                        4. Get up but keep the medication  In your bladder for 60 more minutes- for a total of 2 hours                        5. Empty your bladder following the directions below (if you have difficulty holding your bladder it is ok to empty your bladder early)  -Wear pad if incontinence is a possibliity  -Wash hands throughly after using the bathroom  -For safety reasons remember to follow these directions for 6 hours after each treatment:                        1. Sit on the toilet seat to urinate                        2. Immediately after urinating- add 2 cups of undiluted chlorine bleach to the toilet                         3. Close lid and let the bleach sit for 15 minutes each time                        4. Drink plenty of water to flush any remaining medication out of your bladder     **These steps will help kill all the BCG bacteria and disinfect the toilet**     Take your antibiotic today at 8pm and tomorrow morning.        Please call urology triage line at [phone] or [phone] with fevers or chills, burning with urination, or blood in your urine which lasts more than a 48-72 hours or with any question or concerns.

## 2022-06-17 NOTE — PROGRESS NOTES
Infusion Nursing Note:  Dimple Oviedo presents today for BCG Maintenance.  Instillation number 1 of 3.   Patient seen by provider today: No   present during visit today: Not Applicable.    Note: Patient arrives to infusion feeling well. At baseline, reports frequency/urgency due to Lasix, but has not had issues holding BCG for the dwell period in the past. Denies dysuria, increased urgency, increased frequency, hematuria, fever, or chills today.      No visible blood in patient's urine sample today. New Rx requested for Levaquin today. Kiera Figueroa, RNCC placed new order. Reinforced medication education and patient verbalized understanding.    Treatment Conditions:   Patient states no concerns or issues at this time.  Ok to proceed with treatment.     Labs Reviewed:  Urine analysis.  UA RESULTS:  Lab Test 06/17/22  1323   COLOR Straw   APPEARANCE Clear   URINEGLC Negative   URINEBILI Negative   URINEKETONE Negative   SG 1.005   UBLD Negative   URINEPH 6.0   PROTEIN Negative   UROBILINOGEN  --    NITRITE Negative   LEUKEST Negative   RBCU  --    WBCU  --      Results meet treatment conditions.     Procedure:  16F catheter placed.  Catheter placed without trauma.  Clear/yellow urine drained from bladder.    Patient turned every 15 minutes per protocol: Yes, turning completed at clinic per protocol.  Patient tolerated instillation without incident.    Dwell time: 2 hours    Patient instructed on the following and verbalized understanding:   Medication to remain in the bladder for 2 hours post instillation: YES  Post instillation side effects and precautions discussed: YES  Levaquin dose to be taken 6 hours post instillation and tomorrow morning: YES    Discharge Plan:   Prescription refills given for Levaquin.  Discharge instructions reviewed with: Patient.  Patient and/or family verbalized understanding of discharge instructions and all questions answered.  Copy of AVS reviewed with patient and/or  family.  Patient will return 6/24 for next appointment.  Patient discharged in stable condition accompanied by: self.  Departure Mode: Ambulatory.    Shannen Pulliam RN

## 2022-06-24 ENCOUNTER — INFUSION THERAPY VISIT (OUTPATIENT)
Dept: ONCOLOGY | Facility: CLINIC | Age: 87
End: 2022-06-24
Attending: UROLOGY
Payer: MEDICARE

## 2022-06-24 VITALS
RESPIRATION RATE: 16 BRPM | SYSTOLIC BLOOD PRESSURE: 117 MMHG | OXYGEN SATURATION: 96 % | TEMPERATURE: 98.4 F | DIASTOLIC BLOOD PRESSURE: 76 MMHG | HEART RATE: 63 BPM

## 2022-06-24 DIAGNOSIS — C66.1 UROTHELIAL CARCINOMA OF RIGHT DISTAL URETER (H): ICD-10-CM

## 2022-06-24 DIAGNOSIS — C68.9 UROTHELIAL CANCER (H): Primary | ICD-10-CM

## 2022-06-24 DIAGNOSIS — C68.9 UROTHELIAL CARCINOMA (H): ICD-10-CM

## 2022-06-24 PROCEDURE — 51720 TREATMENT OF BLADDER LESION: CPT

## 2022-06-24 PROCEDURE — 81003 URINALYSIS AUTO W/O SCOPE: CPT | Performed by: UROLOGY

## 2022-06-24 PROCEDURE — 250N000011 HC RX IP 250 OP 636: Performed by: UROLOGY

## 2022-06-24 RX ADMIN — BACILLUS CALMETTE-GUERIN 50 MG: 50 POWDER, FOR SUSPENSION INTRAVESICAL at 14:58

## 2022-06-24 ASSESSMENT — PAIN SCALES - GENERAL: PAINLEVEL: NO PAIN (0)

## 2022-06-24 NOTE — PROGRESS NOTES
Infusion Nursing Note:  Dimple Oviedo presents today for maintenance BCG.  Instillation number 2 of 3.   Patient seen by provider today: No   present during visit today: Not Applicable.    Note:   Patient tolerated and held the BCG for 2 hours last week without incident. She is feeling well today. She denies blood in urine, burning on urination, fevers, chills, increased frequency, urgency, and pain.    Reviewed procedure and side effects with patient. She denied questions or concerns. She confirms she has enough levaquin at home.    Treatment Conditions:   Patient states no concerns or issues at this time.  Ok to proceed with treatment.  No visible blood seen in urine sample today.     Pre-procedure Assessment:  Dysuria: No  Hematuria: No  Fever/chills: No  Increased urinary urgency: No  Increased urinary frequency: No    Labs Reviewed:  Urine analysis.  Results meet treatment conditions.   UA RESULTS:     Latest Reference Range & Units 06/24/22 13:53   Color Urine Colorless, Straw, Light Yellow, Yellow  Straw   Appearance Urine Clear  Clear   Glucose Urine Negative mg/dL Negative   Bilirubin Urine Negative  Negative   Ketones Urine Negative mg/dL Negative   Specific Gravity Urine 1.003 - 1.035  1.005   pH Urine 5.0 - 7.0  6.0   Protein Albumin Urine Negative mg/dL Negative   Urobilinogen mg/dL Normal, 2.0 mg/dL Normal   Nitrite Urine Negative  Negative   Blood Urine Negative  Negative   Leukocyte Esterase Urine Negative  Negative       Procedure:  16F goldman catheter placed.  Catheter placed without trauma.  Clear/yellow urine drained from bladder.    Patient turned every 15 minutes per protocol: Yes, turning completed at clinic per protocol.  Patient tolerated instillation without incident.    Patient instructed on the following and verbalized understanding:   Medication to remain in the bladder for 2 hours post instillation: YES  Post instillation side effects and precautions discussed:  YES    Discharge Plan:   Patient declined prescription refills.  Discharge instructions reviewed with: Patient.  Patient and/or family verbalized understanding of discharge instructions and all questions answered.  Copy of AVS reviewed with patient and/or family.  Patient will return 6/30 for next appointment.   Patient discharged in stable condition accompanied by: self.  Departure Mode: Ambulatory.    Genie Sutton RN

## 2022-06-24 NOTE — PATIENT INSTRUCTIONS
Home discharge instructions:  -To make sure each treatment has the best chance of working, follow these steps 2 hours after each treatment   1. Lie on your back for 15 minues   2. Lie on your left side for 15 mintues   3. Lie on your right side for 15 minutes   4. Get up but keep the medication in your bladder for 60 more minutes- for a total of 2 hours   5. Empty your bladder following the directions below (if you have difficulty holding your bladder it is ok to empty your bladder early)  -Wear pad if incontinence is a possibliity  -Wash hands throughly after using the bathroom  -For safety reasons remember to follow these directions for 6 hours after each treatment:  (for 6 hours after your empty the BCG from your bladder)   1. Sit on the toilet seat to urinate   2. Immediately after urinating- add 2 cups of undiluted chlorine bleach to the toilet    3. Close lid and let the bleach sit for 15 minutes each time   4. Drink plenty of water to flush any remaining medication out of your bladder    **These steps will help kill all the BCG bacteria and disinfect the toilet**    Please void BCG at 5pm    Take your antibiotic today at 9 pm and tomorrow morning.      Please call urology triage line at [phone] with fevers or chills, burning with urination, or blood in your urine which lasts more than a 48-72 hours or with any question or concerns.  June 2022 Sunday Monday Tuesday Wednesday Thursday Friday Saturday                  1     2     3     4       5     6    RETURN  11:45 AM   (30 min.)   Maura Hook NP   [facility] [place]    HHT   1:45 PM   (15 min.)   Adeline Chaney MD   [facility] 7     8     9     10    CYSTOSCOPY   8:25 AM   (20 min.)   Justin Henry MD   [facility] [place] 11       12     13     14     15     16     17    ONC INFUSION 2 HR (120 MIN)   1:00 PM   (120 min.)   SANJAY  ONC INFUSION NURSE   [facility] 18       19     20     21     22     23  Happy Birthday!     24    ONC INFUSION 2 HR (120 MIN)   1:30 PM   (120 min.)    ONC INFUSION NURSE   [facility] 25       26     27     28     29     30    ONC INFUSION 2 HR (120 MIN)   1:00 PM   (120 min.)    ONC INFUSION NURSE   [facility]                         July 2022 Sunday Monday Tuesday Wednesday Thursday Friday Saturday                            1     2       3     4     5     6     7     8     9       10     11     12     13     14     15     16       17     18     19     20     21     22     23       24     25     26     27     28     29     30       31

## 2022-06-30 ENCOUNTER — INFUSION THERAPY VISIT (OUTPATIENT)
Dept: ONCOLOGY | Facility: CLINIC | Age: 87
End: 2022-06-30
Attending: UROLOGY
Payer: MEDICARE

## 2022-06-30 VITALS
DIASTOLIC BLOOD PRESSURE: 56 MMHG | HEART RATE: 89 BPM | TEMPERATURE: 98.3 F | OXYGEN SATURATION: 95 % | RESPIRATION RATE: 16 BRPM | SYSTOLIC BLOOD PRESSURE: 106 MMHG

## 2022-06-30 DIAGNOSIS — C68.9 UROTHELIAL CARCINOMA (H): ICD-10-CM

## 2022-06-30 DIAGNOSIS — C68.9 UROTHELIAL CANCER (H): Primary | ICD-10-CM

## 2022-06-30 DIAGNOSIS — C66.1 UROTHELIAL CARCINOMA OF RIGHT DISTAL URETER (H): ICD-10-CM

## 2022-06-30 LAB
ALBUMIN UR-MCNC: NEGATIVE MG/DL
APPEARANCE UR: CLEAR
BILIRUB UR QL STRIP: NEGATIVE
COLOR UR AUTO: YELLOW
GLUCOSE UR STRIP-MCNC: NEGATIVE MG/DL
HGB UR QL STRIP: NEGATIVE
KETONES UR STRIP-MCNC: NEGATIVE MG/DL
LEUKOCYTE ESTERASE UR QL STRIP: NEGATIVE
NITRATE UR QL: NEGATIVE
PH UR STRIP: 6 [PH] (ref 5–7)
SP GR UR STRIP: 1 (ref 1–1.03)
UROBILINOGEN UR STRIP-MCNC: NORMAL MG/DL

## 2022-06-30 PROCEDURE — 51720 TREATMENT OF BLADDER LESION: CPT

## 2022-06-30 PROCEDURE — 88120 CYTP URNE 3-5 PROBES EA SPEC: CPT | Mod: TC | Performed by: UROLOGY

## 2022-06-30 PROCEDURE — 81003 URINALYSIS AUTO W/O SCOPE: CPT | Performed by: UROLOGY

## 2022-06-30 PROCEDURE — 250N000011 HC RX IP 250 OP 636: Performed by: UROLOGY

## 2022-06-30 PROCEDURE — 88112 CYTOPATH CELL ENHANCE TECH: CPT | Mod: TC | Performed by: UROLOGY

## 2022-06-30 RX ADMIN — BACILLUS CALMETTE-GUERIN 50 MG: 50 POWDER, FOR SUSPENSION INTRAVESICAL at 14:15

## 2022-06-30 ASSESSMENT — PAIN SCALES - GENERAL: PAINLEVEL: NO PAIN (0)

## 2022-06-30 NOTE — PATIENT INSTRUCTIONS
Home discharge instructions:  -To make sure each treatment has the best chance of working, follow these steps 2 hours after each treatment   1. Lie on your back for 15 minues   2. Lie on your left side for 15 mintues   3. Lie on your right side for 15 minutes   4. Get up but keep the medication in your bladder for 60 more minutes- for a total of 2 hours   5. Empty your bladder following the directions below (if you have difficulty holding your bladder it is ok to empty your bladder early)  -Wear pad if incontinence is a possibliity  -Wash hands throughly after using the bathroom  -For safety reasons remember to follow these directions for 6 hours after each treatment:  (for 6 hours after your empty the BCG from your bladder)   1. Sit on the toilet seat to urinate   2. Immediately after urinating- add 2 cups of undiluted chlorine bleach to the toilet    3. Close lid and let the bleach sit for 15 minutes each time   4. Drink plenty of water to flush any remaining medication out of your bladder    **These steps will help kill all the BCG bacteria and disinfect the toilet**    Please void BCG at 4:15 pm    Take your antibiotic today at 8:15 pm and tomorrow morning.      Please call urology triage line at [phone] with fevers or chills, burning with urination, or blood in your urine which lasts more than a 48-72 hours or with any question or concerns.  June 2022 Sunday Monday Tuesday Wednesday Thursday Friday Saturday                  1     2     3     4       5     6    RETURN  11:45 AM   (30 min.)   Maura Hook NP   [facility] [place]    HHT   1:45 PM   (15 min.)   Adeline Chaney MD   [facility] 7     8     9     10    CYSTOSCOPY   8:25 AM   (20 min.)   Justin Henry MD   [facility] [place] 11       12     13     14     15     16     17    ONC INFUSION 2 HR (120 MIN)   1:00 PM   (120  min.)    ONC INFUSION NURSE   [facility] 18       19     20     21     22     23  Happy Birthday!     24    ONC INFUSION 2 HR (120 MIN)   1:30 PM   (120 min.)    ONC INFUSION NURSE   [facility] 25       26     27     28     29     30    ONC INFUSION 2 HR (120 MIN)   1:00 PM   (120 min.)    ONC INFUSION NURSE   [facility]                         July 2022 Sunday Monday Tuesday Wednesday Thursday Friday Saturday                            1     2       3     4     5     6     7     8     9       10     11     12     13     14     15     16       17     18     19     20     21     22     23       24     25     26     27     28     29     30       31

## 2022-06-30 NOTE — PROGRESS NOTES
Infusion Nursing Note:  Dimple Oviedo presents today for maintenance BCG.  Instillation number 3 of 3.   Patient seen by provider today: No   present during visit today: Not Applicable.    Note: Stays to dwell.  Was able to hold her urine/BCG for 2 hours last week.  Endorses burning and pink urine with her first two voids post last treatment.  Burning continued but she was back to her urinary baseline by the next day.  Took her Levaquin as prescribed.  Has 2 tablets left to complete this course.  Completed bleaching process as instructed.    No visible blood seen in today's urine sent to lab.    Treatment Conditions:   Patient states no concerns or issues at this time.  Ok to proceed with treatment.     Labs Reviewed:  Urine analysis.   Latest Reference Range & Units 06/30/22 13:09   Color Urine Colorless, Straw, Light Yellow, Yellow  Yellow   Appearance Urine Clear  Clear   Glucose Urine Negative mg/dL Negative   Bilirubin Urine Negative  Negative   Ketones Urine Negative mg/dL Negative   Specific Gravity Urine 1.003 - 1.035  1.005   pH Urine 5.0 - 7.0  6.0   Protein Albumin Urine Negative mg/dL Negative   Urobilinogen mg/dL Normal, 2.0 mg/dL Normal   Nitrite Urine Negative  Negative   Blood Urine Negative  Negative   Leukocyte Esterase Urine Negative  Negative       Procedure:  16F goldman catheter placed.  Catheter placed without trauma.  Clear/yellow urine drained from bladder.    Patient turned every 15 minutes per protocol: Yes, turning completed at clinic per protocol.  Patient tolerated instillation without incident.    BCG Dwell Time:  2 hours      Discharge Plan:   Patient declined prescription refills.  Copy of AVS reviewed with patient and/or family.  Patient will return 8/12/22 cysto w/Dr. Henry for next appointment.  Patient discharged in stable condition accompanied by: self.  Departure Mode: Ambulatory with walker.  Face to Face time: 0.    Gemma Puga, RN, RN

## 2022-07-01 LAB
PATH REPORT.COMMENTS IMP SPEC: NORMAL
PATH REPORT.COMMENTS IMP SPEC: NORMAL
PATH REPORT.FINAL DX SPEC: NORMAL
PATH REPORT.GROSS SPEC: NORMAL
PATH REPORT.MICROSCOPIC SPEC OTHER STN: NORMAL
PATH REPORT.RELEVANT HX SPEC: NORMAL

## 2022-07-01 PROCEDURE — 88112 CYTOPATH CELL ENHANCE TECH: CPT | Mod: 26 | Performed by: PATHOLOGY

## 2022-07-07 ENCOUNTER — TELEPHONE (OUTPATIENT)
Dept: FAMILY MEDICINE | Facility: CLINIC | Age: 87
End: 2022-07-07

## 2022-07-07 DIAGNOSIS — I89.0 LYMPHEDEMA: Primary | ICD-10-CM

## 2022-07-07 NOTE — TELEPHONE ENCOUNTER
Reason for Call: Request for an order or referral:    Order or referral being requested: referral    Date needed: as soon as possible    Has the patient been seen by the PCP for this problem? YES    Additional comments: patient is asking for another referral to go see provider regarding her lymphedema    Phone number Patient can be reached at:  Home number on file [phone] (home)    Best Time:      Can we leave a detailed message on this number?  YES    Call taken on 7/7/2022 at 1:23 PM by Krysta Myers

## 2022-07-12 PROCEDURE — 88120 CYTP URNE 3-5 PROBES EA SPEC: CPT | Mod: 26 | Performed by: PATHOLOGY

## 2022-07-18 ENCOUNTER — HOSPITAL ENCOUNTER (OUTPATIENT)
Dept: PHYSICAL THERAPY | Facility: CLINIC | Age: 87
Setting detail: THERAPIES SERIES
Discharge: HOME | End: 2022-07-18
Attending: FAMILY MEDICINE
Payer: MEDICARE

## 2022-07-18 DIAGNOSIS — I89.0 LYMPHEDEMA: ICD-10-CM

## 2022-07-18 PROCEDURE — 97140 MANUAL THERAPY 1/> REGIONS: CPT | Mod: GP | Performed by: PHYSICAL THERAPIST

## 2022-07-18 PROCEDURE — 97162 PT EVAL MOD COMPLEX 30 MIN: CPT | Mod: GP | Performed by: PHYSICAL THERAPIST

## 2022-07-18 NOTE — PROGRESS NOTES
[facility]        OUTPATIENT PHYSICAL THERAPY EDEMA EVALUATION  PLAN OF TREATMENT FOR OUTPATIENT REHABILITATION  (COMPLETE FOR INITIAL CLAIMS ONLY)  Patient's Last Name, First Name, JAMMIEYISSELRobert  PreetDimple                           Provider s Name:   [facility] Medical Record No.  [id number]     Start of Care Date:  07/18/22   Onset Date:  06/25/18   Type:  PT   Medical Diagnosis:  Lymphedema   Therapy Diagnosis:  Lymphedema Visits from SOC:  1                                     __________________________________________________________________________________   Plan of Treatment/Functional Goals:    Manual lymph drainage, Exercises, Fit for compression garment, Precautions to prevent infection / exacerbation, Education, Manual therapy, Skin care / precautions, Home management program development        GOALS  1. Goal description: Pt will obtain and utilize appropriate BLE compression to prevent progression of BLE edema       Target date: 10/16/22  2. Goal description: Pt will demonstrate accurate completion of HEP including self massage, elevation, compression and exercise to reduce and maintain BLE edema       Target date: 10/16/22  3. Goal description: Pt will have a reduced impact of edema on daily life with LLIS of less than 15       Target date: 10/16/22           Treatment Frequency: 1x/week   Treatment duration: 90 days with follow up    Crista Wilcox, PT                                    I CERTIFY THE NEED FOR THESE SERVICES FURNISHED UNDER        THIS PLAN OF TREATMENT AND WHILE UNDER MY CARE     (Physician co-signature of this document indicates review and certification of the therapy plan).                   Certification date from: 07/18/22       Certification date to: 10/16/22           Referring physician: Pop Zapien MD   Initial  Assessment  See Epic Evaluation- Start of care: 07/18/22

## 2022-08-03 ENCOUNTER — PRE VISIT (OUTPATIENT)
Dept: UROLOGY | Facility: CLINIC | Age: 87
End: 2022-08-03

## 2022-08-03 NOTE — TELEPHONE ENCOUNTER
Reason for visit: follow up cysto     Relevant information: urothelial cancer    Records/imaging/labs/orders: in epic    Pt called: no    At Rooming: normal

## 2022-08-12 ENCOUNTER — OFFICE VISIT (OUTPATIENT)
Dept: UROLOGY | Facility: CLINIC | Age: 87
End: 2022-08-12
Payer: MEDICARE

## 2022-08-12 VITALS
HEART RATE: 108 BPM | SYSTOLIC BLOOD PRESSURE: 151 MMHG | WEIGHT: 168 LBS | BODY MASS INDEX: 36.24 KG/M2 | HEIGHT: 57 IN | DIASTOLIC BLOOD PRESSURE: 84 MMHG

## 2022-08-12 DIAGNOSIS — C68.9 UROTHELIAL CANCER (H): Primary | ICD-10-CM

## 2022-08-12 PROCEDURE — 88112 CYTOPATH CELL ENHANCE TECH: CPT | Mod: TC | Performed by: UROLOGY

## 2022-08-12 PROCEDURE — 52000 CYSTOURETHROSCOPY: CPT | Performed by: UROLOGY

## 2022-08-12 PROCEDURE — 88112 CYTOPATH CELL ENHANCE TECH: CPT | Mod: 26 | Performed by: PATHOLOGY

## 2022-08-12 ASSESSMENT — PAIN SCALES - GENERAL: PAINLEVEL: NO PAIN (0)

## 2022-08-12 NOTE — LETTER
8/12/2022       RE: Dimple Oviedo  [address]  [place] [address]     Dear Colleague,    Thank you for referring your patient, Dimple Oviedo, to the [facility] [place] at [facility]. Please see a copy of my visit note below.    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 She completed chemotherapy (gem/carbo).     -Continue q3-4 month CT Chest Abdomen and Pelvis with and without contrast with Dr Fine.     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   Got 6 of BCG and tolerated it well.   Negative biopsies 4/2020, 8/26/20.    Plan for 3 weekly maintenance BCG treatments at 3, 6, 12, 18, 24, 30, and 36 months.    6/2022 30 month BCG    Follow-up Protocol [facility] High Risk Bladder Cancer  Cystoscopy and Cytology should be done every:  3-4 months for 2 years  6 months for years 3-4  Annually thereafter  Consider upper tract imaging at 1-2 year intervals  2016 [facility] Diagnosis and Treatment of Non-Muscle Invasive Bladder Cancer: AUA/SUO Guideline      Plan  -36 month BCG maintenance 2/2023.  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 The patient is following wit Dr. Toledo of Hematology and Oncology.      Per Dr. Toledo's note, on 12/12/18 Mr. Oviedo- Started adjuvant chemotherapy (carbo+gem) per POUT trial. Cycle 2 - 1/2/19. Cycle 3 - 1/23/19. Cycle 4 - 02/13/19.    Cystoscopy on 10/03/2019 showed small area of erythema. Bladder biopsy from 01/13/2020 showed urothelial carcinoma in-situ , negative biopsy 5/2020    Intravesical Therapy: BCG X6 Feb 2020  (Full Strength)     She had right total knee arthoplasty 02/17/2020    Dr Fine is doing follow-up as well.    Today she is doing well.  She is here with her daughter.      CYSTOSCOPY PROCEDURE:  Sterile preparation and drape and an informed consent were performed.  A 16-Dutch flexible cystoscope was introduced via the urethra.  The urethra was open.  The bladder mucosa showed no evidence of any lesions or trabeculation.  There were no stones.      Sincerely,    Justin Henry MD

## 2022-08-12 NOTE — PROGRESS NOTES
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 She completed chemotherapy (gem/carbo).     -Continue q3-4 month CT Chest Abdomen and Pelvis with and without contrast with Dr Fine.     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   Got 6 of BCG and tolerated it well.   Negative biopsies 4/2020, 8/26/20.    Plan for 3 weekly maintenance BCG treatments at 3, 6, 12, 18, 24, 30, and 36 months.    6/2022 30 month BCG    Follow-up Protocol [facility] High Risk Bladder Cancer  Cystoscopy and Cytology should be done every:  3-4 months for 2 years  6 months for years 3-4  Annually thereafter  Consider upper tract imaging at 1-2 year intervals  2016 [facility] Diagnosis and Treatment of Non-Muscle Invasive Bladder Cancer: AUA/SUO Guideline      Plan  -36 month BCG maintenance 2/2023.  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 The patient is following yolanda Toeldo of Hematology and Oncology.      Per Dr. Toledo's note, on 12/12/18 Mr. Oviedo- Started adjuvant chemotherapy (carbo+gem) per POUT trial. Cycle 2 - 1/2/19. Cycle 3 - 1/23/19. Cycle 4 - 02/13/19.    Cystoscopy on 10/03/2019 showed small area of erythema. Bladder biopsy from 01/13/2020 showed urothelial carcinoma in-situ , negative biopsy 5/2020    Intravesical Therapy: BCG X6 Feb 2020 (Full Strength)     She had right total knee arthoplasty 02/17/2020    Dr Fine is doing follow-up as well.    Today she is doing well.  She is here with her daughter.      CYSTOSCOPY PROCEDURE:  Sterile preparation and drape and an informed consent were performed.  A 16-Divehi flexible cystoscope was introduced via the  urethra.  The urethra was open.  The bladder mucosa showed no evidence of any lesions or trabeculation.  There were no stones.

## 2022-08-12 NOTE — PATIENT INSTRUCTIONS
"Schedule a cystoscopy with Dr. Henry in 6 months prior to your next BCG treatment.    It was a pleasure meeting with you today.  Thank you for allowing me and my team the privilege of caring for you today.  YOU are the reason we are here, and I truly hope we provided you with the excellent service you deserve.  Please let us know if there is anything else we can do for you so that we can be sure you are leaving completely satisfied with your care experience.        AFTER YOUR CYSTOSCOPY        You have just completed a cystoscopy, or \"cysto\", which allowed your physician to learn more about your bladder (or to remove a stent placed after surgery). We suggest that you continue to avoid caffeine, fruit juice, and alcohol for the next 24 hours, however, you are encouraged to return to your normal activities.         A few things that are considered normal after your cystoscopy:     * Small amount of bleeding (or spotting) that clears within the next 24 hours     * Slight burning sensation with urination     * Sensation to of needing to avoid more frequently     * The feeling of \"air\" in your urine     * Mild discomfort that is relieved with Tylenol        Please contact our office promptly if you:     * Develop a fever above 101 degrees     * Are unable to urinate     * Develop bright red blood that does not stop     * Severe pain or swelling         Please contact our office with any concerns or questions [email or web address].  "

## 2022-08-15 ENCOUNTER — HOSPITAL ENCOUNTER (OUTPATIENT)
Dept: PHYSICAL THERAPY | Facility: CLINIC | Age: 87
Setting detail: THERAPIES SERIES
Discharge: HOME | End: 2022-08-15
Attending: FAMILY MEDICINE
Payer: MEDICARE

## 2022-08-15 PROCEDURE — 97140 MANUAL THERAPY 1/> REGIONS: CPT | Mod: GP | Performed by: PHYSICAL THERAPIST

## 2022-08-22 ENCOUNTER — HOSPITAL ENCOUNTER (OUTPATIENT)
Dept: PHYSICAL THERAPY | Facility: CLINIC | Age: 87
Setting detail: THERAPIES SERIES
Discharge: HOME | End: 2022-08-22
Attending: FAMILY MEDICINE
Payer: MEDICARE

## 2022-08-22 PROCEDURE — 97140 MANUAL THERAPY 1/> REGIONS: CPT | Mod: GP | Performed by: PHYSICAL THERAPIST

## 2022-08-22 PROCEDURE — 97535 SELF CARE MNGMENT TRAINING: CPT | Mod: GP | Performed by: PHYSICAL THERAPIST

## 2022-08-22 PROCEDURE — 97110 THERAPEUTIC EXERCISES: CPT | Mod: GP | Performed by: PHYSICAL THERAPIST

## 2022-08-27 DIAGNOSIS — E04.2 NONTOXIC MULTINODULAR GOITER: ICD-10-CM

## 2022-08-27 DIAGNOSIS — E05.00 GRAVES DISEASE: ICD-10-CM

## 2022-08-27 RX ORDER — METHIMAZOLE 5 MG/1
TABLET ORAL
Qty: 30 TABLET | Refills: 1 | Status: CANCELLED | OUTPATIENT
Start: 2022-08-27

## 2022-08-31 DIAGNOSIS — E04.2 NONTOXIC MULTINODULAR GOITER: ICD-10-CM

## 2022-08-31 DIAGNOSIS — E05.00 GRAVES DISEASE: ICD-10-CM

## 2022-09-01 RX ORDER — METHIMAZOLE 5 MG/1
TABLET ORAL
Qty: 90 TABLET | Refills: 1 | Status: SHIPPED | OUTPATIENT
Start: 2022-09-01 | End: 2023-01-24

## 2022-09-05 ENCOUNTER — HOSPITAL ENCOUNTER (EMERGENCY)
Facility: CLINIC | Age: 87
Discharge: HOME | End: 2022-09-05
Attending: EMERGENCY MEDICINE | Admitting: EMERGENCY MEDICINE
Payer: MEDICARE

## 2022-09-05 VITALS
HEART RATE: 87 BPM | RESPIRATION RATE: 18 BRPM | SYSTOLIC BLOOD PRESSURE: 107 MMHG | OXYGEN SATURATION: 97 % | DIASTOLIC BLOOD PRESSURE: 67 MMHG | TEMPERATURE: 97.9 F

## 2022-09-05 DIAGNOSIS — N39.0 URINARY TRACT INFECTION WITHOUT HEMATURIA, SITE UNSPECIFIED: ICD-10-CM

## 2022-09-05 LAB
ALBUMIN SERPL BCG-MCNC: 4 G/DL (ref 3.5–5.2)
ALBUMIN UR-MCNC: NEGATIVE MG/DL
ALP SERPL-CCNC: 97 U/L (ref 35–104)
ALT SERPL W P-5'-P-CCNC: 18 U/L (ref 10–35)
ANION GAP SERPL CALCULATED.3IONS-SCNC: 12 MMOL/L (ref 7–15)
APPEARANCE UR: ABNORMAL
AST SERPL W P-5'-P-CCNC: ABNORMAL U/L
BACTERIA #/AREA URNS HPF: ABNORMAL /HPF
BASOPHILS # BLD AUTO: 0.1 10E3/UL (ref 0–0.2)
BASOPHILS NFR BLD AUTO: 1 %
BILIRUB SERPL-MCNC: 0.4 MG/DL
BILIRUB UR QL STRIP: NEGATIVE
BUN SERPL-MCNC: 19.8 MG/DL (ref 8–23)
CALCIUM SERPL-MCNC: 9.2 MG/DL (ref 8.8–10.2)
CHLORIDE SERPL-SCNC: 98 MMOL/L (ref 98–107)
COLOR UR AUTO: ABNORMAL
CREAT SERPL-MCNC: 1.18 MG/DL (ref 0.51–0.95)
DEPRECATED HCO3 PLAS-SCNC: 25 MMOL/L (ref 22–29)
EOSINOPHIL # BLD AUTO: 0.2 10E3/UL (ref 0–0.7)
EOSINOPHIL NFR BLD AUTO: 2 %
ERYTHROCYTE [DISTWIDTH] IN BLOOD BY AUTOMATED COUNT: 14.4 % (ref 10–15)
GFR SERPL CREATININE-BSD FRML MDRD: 44 ML/MIN/1.73M2
GLUCOSE SERPL-MCNC: 133 MG/DL (ref 70–99)
GLUCOSE UR STRIP-MCNC: NEGATIVE MG/DL
HCT VFR BLD AUTO: 36.9 % (ref 35–47)
HGB BLD-MCNC: 11.6 G/DL (ref 11.7–15.7)
HGB UR QL STRIP: NEGATIVE
IMM GRANULOCYTES # BLD: 0 10E3/UL
IMM GRANULOCYTES NFR BLD: 0 %
KETONES UR STRIP-MCNC: NEGATIVE MG/DL
LEUKOCYTE ESTERASE UR QL STRIP: ABNORMAL
LYMPHOCYTES # BLD AUTO: 1.2 10E3/UL (ref 0.8–5.3)
LYMPHOCYTES NFR BLD AUTO: 15 %
MCH RBC QN AUTO: 30.1 PG (ref 26.5–33)
MCHC RBC AUTO-ENTMCNC: 31.4 G/DL (ref 31.5–36.5)
MCV RBC AUTO: 96 FL (ref 78–100)
MONOCYTES # BLD AUTO: 0.6 10E3/UL (ref 0–1.3)
MONOCYTES NFR BLD AUTO: 7 %
NEUTROPHILS # BLD AUTO: 6.3 10E3/UL (ref 1.6–8.3)
NEUTROPHILS NFR BLD AUTO: 75 %
NITRATE UR QL: NEGATIVE
NRBC # BLD AUTO: 0 10E3/UL
NRBC BLD AUTO-RTO: 0 /100
PH UR STRIP: 5.5 [PH] (ref 5–7)
PLATELET # BLD AUTO: 210 10E3/UL (ref 150–450)
POTASSIUM SERPL-SCNC: 4.2 MMOL/L (ref 3.4–5.3)
PROT SERPL-MCNC: 6.9 G/DL (ref 6.4–8.3)
RBC # BLD AUTO: 3.85 10E6/UL (ref 3.8–5.2)
RBC URINE: 1 /HPF
SODIUM SERPL-SCNC: 135 MMOL/L (ref 136–145)
SP GR UR STRIP: 1.01 (ref 1–1.03)
SQUAMOUS EPITHELIAL: 1 /HPF
UROBILINOGEN UR STRIP-MCNC: NORMAL MG/DL
WBC # BLD AUTO: 8.3 10E3/UL (ref 4–11)
WBC CLUMPS #/AREA URNS HPF: PRESENT /HPF
WBC URINE: 65 /HPF

## 2022-09-05 PROCEDURE — 81001 URINALYSIS AUTO W/SCOPE: CPT | Performed by: NURSE PRACTITIONER

## 2022-09-05 PROCEDURE — 87086 URINE CULTURE/COLONY COUNT: CPT | Performed by: NURSE PRACTITIONER

## 2022-09-05 PROCEDURE — 36415 COLL VENOUS BLD VENIPUNCTURE: CPT | Performed by: NURSE PRACTITIONER

## 2022-09-05 PROCEDURE — 99284 EMERGENCY DEPT VISIT MOD MDM: CPT | Performed by: EMERGENCY MEDICINE

## 2022-09-05 PROCEDURE — 250N000013 HC RX MED GY IP 250 OP 250 PS 637: Performed by: NURSE PRACTITIONER

## 2022-09-05 PROCEDURE — 84155 ASSAY OF PROTEIN SERUM: CPT | Performed by: NURSE PRACTITIONER

## 2022-09-05 PROCEDURE — 85025 COMPLETE CBC W/AUTO DIFF WBC: CPT | Performed by: NURSE PRACTITIONER

## 2022-09-05 PROCEDURE — 99283 EMERGENCY DEPT VISIT LOW MDM: CPT | Performed by: EMERGENCY MEDICINE

## 2022-09-05 RX ORDER — CEFDINIR 300 MG/1
300 CAPSULE ORAL ONCE
Status: DISCONTINUED | OUTPATIENT
Start: 2022-09-05 | End: 2022-09-05 | Stop reason: HOSPADM

## 2022-09-05 RX ORDER — CEFDINIR 300 MG/1
300 CAPSULE ORAL 2 TIMES DAILY
Qty: 19 CAPSULE | Refills: 0 | Status: SHIPPED | OUTPATIENT
Start: 2022-09-05 | End: 2022-09-15

## 2022-09-05 RX ADMIN — CEFDINIR 300 MG: 300 CAPSULE ORAL at 16:10

## 2022-09-05 ASSESSMENT — ACTIVITIES OF DAILY LIVING (ADL)
ADLS_ACUITY_SCORE: 37
ADLS_ACUITY_SCORE: 37

## 2022-09-05 NOTE — ED TRIAGE NOTES
"ED Triage Provider Note  [facility]  Encounter Date: Sep 5, 2022    History:  No chief complaint on file.    Dimple Oviedo is a 89 year old female who presents to the ED with cloudy urine. Symptoms started yesterday. In past has had similar onset of UTI that just started with painless voiding and cloudy urine but then progressed. Currently denies dysuria, increased frequency (althought states she takes \"water pills\" so is uncertain if she is voiding more frequently or not), abdominal pain or pressure, flank pain, fevers or chills, nausea, vomiting, diarrhea, constipation. Feels she is drinking her normal amount of fluids.     Only has 1 kidney, takes \"water pills\"     Review of Systems:  + cloudy urine    Exam:  There were no vitals taken for this visit.  General: No acute distress. Appears stated age.   Cardio: Regular rate, extremities well perfused  Resp: Normal work of breathing, grossly normal respiratory rate  Neuro: Alert. CN II-XII grossly intact. Grossly intact strength.       Medical Decision Making:  Patient arriving to the ED with problem as above. A medical screening exam was performed. Cbc, cmp, UA orders initiated from Triage. The patient is appropriate to wait in triage.      Kiera Iverson, NELSON CNP on 9/5/2022 at 2:26 PM      "

## 2022-09-05 NOTE — DISCHARGE INSTRUCTIONS
Your urine results indicate a urine infection. We gave you a dose of antibiotics in the ER, and sent a prescription for additional antibiotics to your preferred pharmacy. Please pick those up tomorrow morning and start taking them as prescribed. Please take full course of antibiotics even if feeling completely better.    Please make an appointment to follow up with Your Primary Care Provider in 2-3 days if not improving.    Your creatinine was slightly elevated today compared to your normal results. There are a variety of possible reasons this could be, but I strongly recommend you follow up with repeat labs later this week with your primary care provider.     If your symptoms are getting worse or not improving in 2-3 days, or if you develop fever, vomiting (unable to keep down meds), please follow-up with your primary care provider or return to ER. If you have other concerns you may return to ED at any time.

## 2022-09-05 NOTE — ED TRIAGE NOTES
89 yr old female ambulatory with walker to triage presenting with cloudy urine and urinary frequency. She is concerned she has a UTI.      Triage Assessment     Row Name 09/05/22 0820       Triage Assessment (Adult)    Airway WDL WDL       Respiratory WDL    Respiratory WDL WDL       Skin Circulation/Temperature WDL    Skin Circulation/Temperature WDL WDL       Cardiac WDL    Cardiac WDL WDL       Peripheral/Neurovascular WDL    Peripheral Neurovascular WDL WDL       Cognitive/Neuro/Behavioral WDL    Cognitive/Neuro/Behavioral WDL WDL

## 2022-09-06 ENCOUNTER — HOSPITAL ENCOUNTER (OUTPATIENT)
Dept: PHYSICAL THERAPY | Facility: CLINIC | Age: 87
Setting detail: THERAPIES SERIES
Discharge: HOME | End: 2022-09-06
Attending: FAMILY MEDICINE
Payer: MEDICARE

## 2022-09-06 PROCEDURE — 97535 SELF CARE MNGMENT TRAINING: CPT | Mod: GP | Performed by: PHYSICAL THERAPIST

## 2022-09-06 PROCEDURE — 97140 MANUAL THERAPY 1/> REGIONS: CPT | Mod: GP | Performed by: PHYSICAL THERAPIST

## 2022-09-06 NOTE — ED PROVIDER NOTES
ED Provider Note  [facility]      History     Chief Complaint   Patient presents with     Urinary Frequency     HPI  Dimple Oviedo is a 89 year old female with a PMH significant for urothelial carcinoma s/p nephrectomy, malignant neoplasm of bladder, CHF, a-fib, HTN who presents to the ED with cloudy urine. Symptoms started yesterday. In past has had similar onset of UTI that just started with painless voiding and cloudy urine but then progressed. Currently denies dysuria, increased frequency (taking lasix, uncertain if she is voiding more frequently or not), abdominal pain or pressure, flank pain, fevers or chills, body aches, nausea, vomiting, diarrhea, constipation. Feels she is drinking her normal amount of fluids. No recent med changes.        Past Medical History  Past Medical History:   Diagnosis Date     CRESENCIO (acute kidney injury) (H) 9/11/2018     Arthritis      Asthma 2/9/2022     Calculus of kidney      Chemotherapy-induced neutropenia (H) 3/6/2019     Congestive heart failure (H)      Esophageal reflux      GERD (gastroesophageal reflux disease)      Hyperlipidemia LDL goal <130 5/9/2010     Malignant melanoma of skin of trunk, except scrotum (H)      Nonspecific abnormal finding     has living will 2004 -      Nontoxic multinodular goiter     no further eval /tx rec per pt     Osteopenia      Other psoriasis      Personal history of colonic polyps      PMR (polymyalgia rheumatica) (H)      Restless legs syndrome 7/11/2018     Stress-induced cardiomyopathy      Undiagnosed cardiac murmurs      Unspecified constipation      Unspecified essential hypertension      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     Past Surgical History:   Procedure Laterality Date     ARTHROPLASTY KNEE Right 11/9/2020    Procedure: ARTHROPLASTY, KNEE, TOTAL, RIGHT;  Surgeon: Edward Otero MD;  [facility]     BIOPSY       COLONOSCOPY  2014     COMBINED CYSTOSCOPY, INSERT STENT URETER(S)  Bilateral 9/12/2018    Procedure: COMBINED CYSTOSCOPY, INSERT STENT URETER(S);  Cystoscopy Bilateral ureteral Stent Placement.;  Surgeon: Justin Henry MD;  [facility]     COMBINED CYSTOSCOPY, RETROGRADES, URETEROSCOPY, INSERT STENT Bilateral 4/3/2018    Procedure: COMBINED CYSTOSCOPY, RETROGRADES, URETEROSCOPY, INSERT STENT;;  Surgeon: Stuart King MD;  [facility]     COMBINED CYSTOSCOPY, RETROGRADES, URETEROSCOPY, INSERT STENT Bilateral 9/10/2018    Procedure: COMBINED CYSTOSCOPY, RETROGRADES, URETEROSCOPY, INSERT STENT;  Cystoscopy, Bilateral Ureteroscopy, Bladder Biopsies, Retrogram Pyelograms, Ureteral Washings and brushings, cysview;  Surgeon: Stuart King MD;  [facility]     COMBINED CYSTOSCOPY, RETROGRADES, URETEROSCOPY, INSERT STENT Left 8/26/2020    Procedure: Cystoscopy, Left Ureteral Wash, Left ureteral Retrograde Pyelogram;  Surgeon: Justin Henry MD;  [facility]     CYSTOSCOPY, BIOPSY BLADDER INSTILL OPTICAL AGENT N/A 4/3/2018    Procedure: CYSTOSCOPY, BIOPSY BLADDER INSTILL OPTICAL AGENT;  Cystoscopy, Blue Light Cystoscopy, Bladder Biopsies, Bilateral Selective ureteral washings for Cytology, Bilateral Retrograde Pyelograms, Bilateral Ureteroscopy;  Surgeon: Stuart King MD;  [facility]     CYSTOSCOPY, BIOPSY BLADDER, COMBINED N/A 2/19/2018    Procedure: COMBINED CYSTOSCOPY, BIOPSY BLADDER;  Cystoscopy, Bladder Biopsy;  Surgeon: Kenna La MD;  [facility]     CYSTOSCOPY, BIOPSY BLADDER, COMBINED N/A 8/26/2020    Procedure: Cystoscopy, biopsy bladder, combined;  Surgeon: Justin Henry MD;  [facility]     CYSTOSCOPY, FULGURATE BLADDER TUMOR, COMBINED N/A 1/13/2020    Procedure: CYSTOSCOPY, WITH  bladder biopsies and fulguration;  Surgeon: Stuart King MD;  [facility]     CYSTOSCOPY, REMOVE STENT(S), COMBINED  11/13/2018    Procedure: Flexible Cystoscopy with Stent Removal;   Surgeon: Stuart King MD;  [facility]     DAVINCI LYMPHADENECTOMY N/A 11/13/2018    Procedure: Davinci Lymphadenectomy ;  Surgeon: Stuart King MD;  [facility]     DAVINCI NEPHROURETERECTOMY N/A 11/13/2018    Procedure: Right DaVinci Assisted Nephroureterectomy;  Surgeon: Stuart King MD;  [facility]     ENDOSCOPIC ULTRASOUND LOWER GASTROINTESTIONAL TRACT (GI) N/A 10/30/2015    Procedure: ENDOSCOPIC ULTRASOUND LOWER GASTROINTESTIONAL TRACT (GI);  Surgeon: Daniel Jean-Baptiste MD;  [facility]     EYE SURGERY  12/4/17     INSERT PORT VASCULAR ACCESS Right 12/19/2018    Procedure: Chest Port Placement - right;  Surgeon: Stuart Chavez PA-C;  [facility]     IR CHEST PORT PLACEMENT > 5 YRS OF AGE  12/19/2018     IR PORT REMOVAL RIGHT  6/26/2019     LAPAROSCOPIC CHOLECYSTECTOMY WITH CHOLANGIOGRAMS N/A 11/1/2015    Procedure: LAPAROSCOPIC CHOLECYSTECTOMY WITH CHOLANGIOGRAMS;  Surgeon: Tonie Warren MD;  [facility]     REMOVE PORT VASCULAR ACCESS Right 6/26/2019    Procedure: Right Port Removal;  Surgeon: Froilan Irizarry PA-C;  [facility]     SURGICAL HISTORY OF -   1996    malignant melanoma     SURGICAL HISTORY OF -   1968    thyroid nodule     SURGICAL HISTORY OF -       D & C     ZZC NONSPECIFIC PROCEDURE  2005    colonoscopy polyp repeat 2010     cefdinir (OMNICEF) 300 MG capsule  alendronate (FOSAMAX) 70 MG tablet  cephALEXin (KEFLEX) 500 MG capsule  diltiazem ER (DILT-XR) 180 MG 24 hr capsule  ferrous sulfate 325 (65 Fe) MG TBEC EC tablet  furosemide (LASIX) 20 MG tablet  irbesartan (AVAPRO) 300 MG tablet  levofloxacin (LEVAQUIN) 500 MG tablet  lovastatin (MEVACOR) 40 MG tablet  methimazole (TAPAZOLE) 5 MG tablet  Omega-3 Fatty Acids (FISH OIL) 500 MG CAPS  omeprazole (PRILOSEC) 20 MG DR capsule  propranolol ER (INDERAL LA) 60 MG 24 hr capsule  RESTASIS 0.05 % ophthalmic emulsion  rivaroxaban ANTICOAGULANT (XARELTO)  10 MG TABS tablet  rOPINIRole (REQUIP) 0.25 MG tablet  sertraline (ZOLOFT) 100 MG tablet  traZODone (DESYREL) 50 MG tablet      Allergies   Allergen Reactions     Codeine      Family History  Family History   Problem Relation Age of Onset     Cancer Father         dec - esophageal and laryngeal     Heart Disease Mother      Respiratory Mother         dec     Breast Cancer Daughter      Other Cancer Daughter      Thyroid Disease Daughter      Asthma Daughter      Hyperlipidemia Son      Diabetes Son      Anesthesia Reaction No family hx of      Deep Vein Thrombosis (DVT) No family hx of      Social History   Social History     Tobacco Use     Smoking status: Never Smoker     Smokeless tobacco: Never Used   Vaping Use     Vaping Use: Never used   Substance Use Topics     Alcohol use: No     Alcohol/week: 0.0 standard drinks     Comment: rare     Drug use: No      Past medical history, past surgical history, medications, allergies, family history, and social history were reviewed with the patient. No additional pertinent items.       Review of Systems  A complete review of systems was performed with pertinent positives and negatives noted in the HPI, and all other systems negative.    Physical Exam   BP: 107/67  Pulse: 87  Temp: 97.9  F (36.6  C)  Resp: 18  SpO2: 97 %  Physical Exam  Constitutional:       General: She is not in acute distress.     Appearance: She is not ill-appearing or toxic-appearing.   HENT:      Head: Normocephalic and atraumatic.   Cardiovascular:      Rate and Rhythm: Normal rate and regular rhythm.      Heart sounds: Normal heart sounds.   Pulmonary:      Effort: Pulmonary effort is normal.      Breath sounds: Normal breath sounds.   Abdominal:      General: There is no distension.      Tenderness: There is no abdominal tenderness. There is no right CVA tenderness, left CVA tenderness or guarding.   Musculoskeletal:      Right lower leg: No edema.      Left lower leg: No edema.   Skin:      General: Skin is warm and dry.      Capillary Refill: Capillary refill takes less than 2 seconds.      Findings: No rash.   Neurological:      General: No focal deficit present.      Mental Status: She is alert and oriented to person, place, and time.   Psychiatric:         Mood and Affect: Mood normal.         Behavior: Behavior normal.         ED Course      Procedures          Results for orders placed or performed during the hospital encounter of 09/05/22   Comprehensive metabolic panel     Status: Abnormal   Result Value Ref Range    Sodium 135 (L) 136 - 145 mmol/L    Potassium 4.2 3.4 - 5.3 mmol/L    Creatinine 1.18 (H) 0.51 - 0.95 mg/dL    Urea Nitrogen 19.8 8.0 - 23.0 mg/dL    Chloride 98 98 - 107 mmol/L    Carbon Dioxide (CO2) 25 22 - 29 mmol/L    Anion Gap 12 7 - 15 mmol/L    Glucose 133 (H) 70 - 99 mg/dL    Calcium 9.2 8.8 - 10.2 mg/dL    Protein Total 6.9 6.4 - 8.3 g/dL    Albumin 4.0 3.5 - 5.2 g/dL    Bilirubin Total 0.4 <=1.2 mg/dL    Alkaline Phosphatase 97 35 - 104 U/L    AST      ALT 18 10 - 35 U/L    GFR Estimate 44 (L) >60 mL/min/1.73m2   UA with Microscopic reflex to Culture     Status: Abnormal    Specimen: Urine, Clean Catch   Result Value Ref Range    Color Urine Light Yellow Colorless, Straw, Light Yellow, Yellow    Appearance Urine Slightly Cloudy (A) Clear    Glucose Urine Negative Negative mg/dL    Bilirubin Urine Negative Negative    Ketones Urine Negative Negative mg/dL    Specific Gravity Urine 1.008 1.003 - 1.035    Blood Urine Negative Negative    pH Urine 5.5 5.0 - 7.0    Protein Albumin Urine Negative Negative mg/dL    Urobilinogen Urine Normal Normal, 2.0 mg/dL    Nitrite Urine Negative Negative    Leukocyte Esterase Urine Large (A) Negative    Bacteria Urine Few (A) None Seen /HPF    WBC Clumps Urine Present (A) None Seen /HPF    RBC Urine 1 <=2 /HPF    WBC Urine 65 (H) <=5 /HPF    Squamous Epithelials Urine 1 <=1 /HPF    Narrative    Urine Culture ordered based on laboratory  criteria   CBC with platelets and differential     Status: Abnormal   Result Value Ref Range    WBC Count 8.3 4.0 - 11.0 10e3/uL    RBC Count 3.85 3.80 - 5.20 10e6/uL    Hemoglobin 11.6 (L) 11.7 - 15.7 g/dL    Hematocrit 36.9 35.0 - 47.0 %    MCV 96 78 - 100 fL    MCH 30.1 26.5 - 33.0 pg    MCHC 31.4 (L) 31.5 - 36.5 g/dL    RDW 14.4 10.0 - 15.0 %    Platelet Count 210 150 - 450 10e3/uL    % Neutrophils 75 %    % Lymphocytes 15 %    % Monocytes 7 %    % Eosinophils 2 %    % Basophils 1 %    % Immature Granulocytes 0 %    NRBCs per 100 WBC 0 <1 /100    Absolute Neutrophils 6.3 1.6 - 8.3 10e3/uL    Absolute Lymphocytes 1.2 0.8 - 5.3 10e3/uL    Absolute Monocytes 0.6 0.0 - 1.3 10e3/uL    Absolute Eosinophils 0.2 0.0 - 0.7 10e3/uL    Absolute Basophils 0.1 0.0 - 0.2 10e3/uL    Absolute Immature Granulocytes 0.0 <=0.4 10e3/uL    Absolute NRBCs 0.0 10e3/uL   CBC with platelets differential     Status: Abnormal    Narrative    The following orders were created for panel order CBC with platelets differential.  Procedure                               Abnormality         Status                     ---------                               -----------         ------                     CBC with platelets and d...[[id number]]  Abnormal            Final result                 Please view results for these tests on the individual orders.     Medications - No data to display     Assessments & Plan (with Medical Decision Making)   Dimple Oviedo is a 89 year old female with a PMH significant for urothelial carcinoma s/p nephrectomy, malignant neoplasm of bladder, CHF, a-fib, HTN who presents to the ED with cloudy urine and concern for UTI .     Urinalysis positive for large amount of leukocytes, few bacteria, WBCs. IV established, labs collected and sent for analysis which showed no evidence of leukocytosis. Creatinine elevated above her normal at 1.18 with normal BUN. Advised her to follow-up in clinic this week for repeat labs to  evaluate renal function. Discussed close return precautions with patient. Gave first dose of oral antibiotics in ED. did offer IV antibiotics for first dose this evening in ED, however patient declined preferring course of oral which I believe is appropriate given she has no other symptoms aside from the cloudy urine. Antibiotics prescription sent to patient preferred pharmacy. Encouraged adequate fluid intake. Did not give additional IVF in ED due to her CHF.    I have reviewed the nursing notes. I have reviewed the findings, diagnosis, plan and need for follow up with the patient. Patient in agreement with this plan and was discharged from the emergency department.     Discharge Medication List as of 9/5/2022  4:27 PM      START taking these medications    Details   cefdinir (OMNICEF) 300 MG capsule Take 1 capsule (300 mg) by mouth 2 times daily for 10 days, Disp-19 capsule, R-0, E-Prescribe             Final diagnoses:   Urinary tract infection without hematuria, site unspecified       --  [facility] EMERGENCY DEPARTMENT  9/5/2022         Mario Otero MD  09/07/22 1603      --    ED Attending Physician Attestation    I Mario Otero MD, cared for this patient with the Advanced Practice Provider (DARON). I have performed a history and physical examination of the patient independent of the DARON. I reviewed the DARON's documentation above and agree with the documented findings and plan of care. I personally provided a substantive portion of the care for this patient.    I personally performed the substantive portion of the medical decision making for this visit - please see the DARON's documentation for full details.    Key management decisions made by me and carried out under my direction: will get ua and assess for signs of infection.  Reviewed previous cultures, pansensitive E. coli.  Given patient's age, comorbidities will treat with cefdinir.    Summary of HPI, PE, ED  Course   Patient is a 89 year old female evaluated in the emergency department for cloudy urine.  No other significant symptoms including no fever, systemic symptoms, abdominal pain, flank pain.. Exam notable for benign abdomen, no flank pain. ED course notable for urine consistent with UTI. after the completion of care in the emergency department, the patient was discharged.    Critical Care & Procedures  Not applicable.    Medical Decision Making  The medical record was reviewed and interpreted.  Current labs reviewed and interpreted.      Mario Otero MD  Emergency Medicine          Mario Otero MD  09/07/22 8352

## 2022-09-07 LAB — BACTERIA UR CULT: ABNORMAL

## 2022-09-12 ENCOUNTER — OFFICE VISIT (OUTPATIENT)
Dept: FAMILY MEDICINE | Facility: CLINIC | Age: 87
End: 2022-09-12
Payer: MEDICARE

## 2022-09-12 VITALS
DIASTOLIC BLOOD PRESSURE: 72 MMHG | WEIGHT: 169 LBS | HEART RATE: 86 BPM | TEMPERATURE: 97.1 F | BODY MASS INDEX: 36.46 KG/M2 | OXYGEN SATURATION: 97 % | RESPIRATION RATE: 16 BRPM | HEIGHT: 57 IN | SYSTOLIC BLOOD PRESSURE: 110 MMHG

## 2022-09-12 DIAGNOSIS — N39.0 URINARY TRACT INFECTION WITHOUT HEMATURIA, SITE UNSPECIFIED: Primary | ICD-10-CM

## 2022-09-12 DIAGNOSIS — I48.0 PAROXYSMAL ATRIAL FIBRILLATION (H): ICD-10-CM

## 2022-09-12 DIAGNOSIS — G25.81 RESTLESS LEGS SYNDROME: ICD-10-CM

## 2022-09-12 DIAGNOSIS — I50.22 CHRONIC SYSTOLIC HEART FAILURE (H): ICD-10-CM

## 2022-09-12 DIAGNOSIS — M35.3 POLYMYALGIA RHEUMATICA (H): ICD-10-CM

## 2022-09-12 DIAGNOSIS — M79.89 LEG SWELLING: ICD-10-CM

## 2022-09-12 DIAGNOSIS — N18.30 STAGE 3 CHRONIC KIDNEY DISEASE, UNSPECIFIED WHETHER STAGE 3A OR 3B CKD (H): ICD-10-CM

## 2022-09-12 DIAGNOSIS — I10 ESSENTIAL HYPERTENSION WITH GOAL BLOOD PRESSURE LESS THAN 140/90: ICD-10-CM

## 2022-09-12 PROBLEM — E66.01 MORBID OBESITY (H): Status: ACTIVE | Noted: 2021-11-17

## 2022-09-12 LAB
ANION GAP SERPL CALCULATED.3IONS-SCNC: 6 MMOL/L (ref 3–14)
BUN SERPL-MCNC: 21 MG/DL (ref 7–30)
CALCIUM SERPL-MCNC: 9.2 MG/DL (ref 8.5–10.1)
CHLORIDE BLD-SCNC: 102 MMOL/L (ref 94–109)
CO2 SERPL-SCNC: 27 MMOL/L (ref 20–32)
CREAT SERPL-MCNC: 0.99 MG/DL (ref 0.52–1.04)
GFR SERPL CREATININE-BSD FRML MDRD: 54 ML/MIN/1.73M2
GLUCOSE BLD-MCNC: 116 MG/DL (ref 70–99)
POTASSIUM BLD-SCNC: 4.3 MMOL/L (ref 3.4–5.3)
SODIUM SERPL-SCNC: 135 MMOL/L (ref 133–144)

## 2022-09-12 PROCEDURE — 80048 BASIC METABOLIC PNL TOTAL CA: CPT | Performed by: FAMILY MEDICINE

## 2022-09-12 PROCEDURE — 36415 COLL VENOUS BLD VENIPUNCTURE: CPT | Performed by: FAMILY MEDICINE

## 2022-09-12 PROCEDURE — 99214 OFFICE O/P EST MOD 30 MIN: CPT | Performed by: FAMILY MEDICINE

## 2022-09-12 RX ORDER — ROPINIROLE 0.25 MG/1
TABLET, FILM COATED ORAL
Qty: 270 TABLET | Refills: 1 | Status: SHIPPED | OUTPATIENT
Start: 2022-09-12 | End: 2023-01-18

## 2022-09-12 ASSESSMENT — ANXIETY QUESTIONNAIRES
GAD7 TOTAL SCORE: 0
5. BEING SO RESTLESS THAT IT IS HARD TO SIT STILL: NOT AT ALL
4. TROUBLE RELAXING: NOT AT ALL
7. FEELING AFRAID AS IF SOMETHING AWFUL MIGHT HAPPEN: NOT AT ALL
6. BECOMING EASILY ANNOYED OR IRRITABLE: NOT AT ALL
GAD7 TOTAL SCORE: 0
3. WORRYING TOO MUCH ABOUT DIFFERENT THINGS: NOT AT ALL
7. FEELING AFRAID AS IF SOMETHING AWFUL MIGHT HAPPEN: NOT AT ALL
2. NOT BEING ABLE TO STOP OR CONTROL WORRYING: NOT AT ALL
GAD7 TOTAL SCORE: 0
1. FEELING NERVOUS, ANXIOUS, OR ON EDGE: NOT AT ALL

## 2022-09-12 ASSESSMENT — ASTHMA QUESTIONNAIRES
ACT_TOTALSCORE: 25
QUESTION_4 LAST FOUR WEEKS HOW OFTEN HAVE YOU USED YOUR RESCUE INHALER OR NEBULIZER MEDICATION (SUCH AS ALBUTEROL): NOT AT ALL
QUESTION_2 LAST FOUR WEEKS HOW OFTEN HAVE YOU HAD SHORTNESS OF BREATH: NOT AT ALL
QUESTION_3 LAST FOUR WEEKS HOW OFTEN DID YOUR ASTHMA SYMPTOMS (WHEEZING, COUGHING, SHORTNESS OF BREATH, CHEST TIGHTNESS OR PAIN) WAKE YOU UP AT NIGHT OR EARLIER THAN USUAL IN THE MORNING: NOT AT ALL
QUESTION_5 LAST FOUR WEEKS HOW WOULD YOU RATE YOUR ASTHMA CONTROL: COMPLETELY CONTROLLED
QUESTION_1 LAST FOUR WEEKS HOW MUCH OF THE TIME DID YOUR ASTHMA KEEP YOU FROM GETTING AS MUCH DONE AT WORK, SCHOOL OR AT HOME: NONE OF THE TIME
ACT_TOTALSCORE: 25

## 2022-09-12 ASSESSMENT — PATIENT HEALTH QUESTIONNAIRE - PHQ9
10. IF YOU CHECKED OFF ANY PROBLEMS, HOW DIFFICULT HAVE THESE PROBLEMS MADE IT FOR YOU TO DO YOUR WORK, TAKE CARE OF THINGS AT HOME, OR GET ALONG WITH OTHER PEOPLE: NOT DIFFICULT AT ALL
SUM OF ALL RESPONSES TO PHQ QUESTIONS 1-9: 0
SUM OF ALL RESPONSES TO PHQ QUESTIONS 1-9: 0

## 2022-09-12 NOTE — PROGRESS NOTES
"  Assessment & Plan     Restless legs syndrome  Ok to go up on dose of requip as needed. She agreed. Currently needing 2 pills but used to be on 3 per day. Ok to increase it to 3 per day.  - Basic metabolic panel  (Ca, Cl, CO2, Creat, Gluc, K, Na, BUN); Future  - rOPINIRole (REQUIP) 0.25 MG tablet; TAKE 1 TABLET IN THE AFTERNOON AND TAKE 2 TABLETS EVERY NIGHT  - Basic metabolic panel  (Ca, Cl, CO2, Creat, Gluc, K, Na, BUN)    Essential hypertension with goal blood pressure less than 140/90  bp on lower side but stable.     Leg swelling  Chronic. Using compression sicks.     Urinary tract infection without hematuria, site unspecified  Reviewed ER notes, culture. Completing antibiotics and feeling fine.     Polymyalgia rheumatica (H)  History. Was on streoid taper at the time of diagnosis. No new episode since then.     Chronic systolic heart failure (H)  On lasix. Been to cardiology.     Paroxysmal atrial fibrillation (H)  rivaroxaban and diltiazem. Been to cardiology.     Morbid obesity (H) with co morbidities   As mentioned above. Avoid nephrotoxic drugs.     Stage 3 chronic kidney disease, unspecified whether stage 3a or 3b CKD (H)   could not give us a urine specimen today. Repeat bmp today.                BMI:   Estimated body mass index is 36.57 kg/m  as calculated from the following:    Height as of this encounter: 1.448 m (4' 9\").    Weight as of this encounter: 76.7 kg (169 lb).   Weight management plan: Discussed healthy diet and exercise guidelines        Return in about 3 months (around 12/12/2022).    Pop Zapien MD, MD  [facility]    Davey Musa is a 89 year old, presenting for the following health issues:  Musculoskeletal Problem and UTI      History of Present Illness       Back Pain:  She presents for follow up of back pain. Patient's back pain is a recurring problem.  Location of back pain:  Right lower back, left lower back, right buttock and left " "buttock  Description of back pain: shooting  Back pain spreads: right buttocks and left buttocks    Since patient first noticed back pain, pain is: always present, but gets better and worse  Does back pain interfere with her job:  Not applicable      Reason for visit:  Lab and bladder infection    She eats 2-3 servings of fruits and vegetables daily.She consumes 0 sweetened beverage(s) daily.She exercises with enough effort to increase her heart rate 9 or less minutes per day.  She exercises with enough effort to increase her heart rate 3 or less days per week.   She is taking medications regularly.    Today's PHQ-9         PHQ-9 Total Score: 0    PHQ-9 Q9 Thoughts of better off dead/self-harm past 2 weeks :   Not at all    How difficult have these problems made it for you to do your work, take care of things at home, or get along with other people: Not difficult at all  Today's THERON-7 Score: 0     Back - feels like sciatic nerve.   Also was told to see pcp for high creatinine.     Back pain started after going up and down stairs multiple times. Taking tylenol and heating pad. It helps. In the morning still painful.     No pain with passing urine. Found to have UTI and got antibiotics. No diarrhea or loose stool with antibiotics. Feeling fine.     Has only one kidney. History of urothelial carcinoma of right ureter.     Leg swelling - not new and using compression socks.     Getting flu shot at her living place.     requip - was getting 3 but cut down to 2. Wishes to go back to 3 per day.     Review of Systems         Objective    /72 (BP Location: Right arm, Patient Position: Sitting, Cuff Size: Adult Regular)   Pulse 86   Temp 97.1  F (36.2  C) (Temporal)   Resp 16   Ht 1.448 m (4' 9\")   Wt 76.7 kg (169 lb)   SpO2 97%   BMI 36.57 kg/m    Body mass index is 36.57 kg/m .  Physical Exam   Walking with walker  Both lower limbs non pintting edema                    "

## 2022-09-12 NOTE — PATIENT INSTRUCTIONS
Did you know?      You can schedule a video visit for follow-up appointments as well as future appointments for certain conditions.  Please see the below link.     Video Visits ([email or web address])     If you have not already done so,  I encourage you to sign up for [employer] ([email or web address]).  This will allow you to review your results, securely communicate with a provider, and schedule virtual visits as well.

## 2022-09-25 ENCOUNTER — OFFICE VISIT (OUTPATIENT)
Dept: URGENT CARE | Facility: URGENT CARE | Age: 87
End: 2022-09-25
Payer: MEDICARE

## 2022-09-25 VITALS
SYSTOLIC BLOOD PRESSURE: 138 MMHG | HEIGHT: 57 IN | DIASTOLIC BLOOD PRESSURE: 59 MMHG | BODY MASS INDEX: 36.24 KG/M2 | OXYGEN SATURATION: 96 % | TEMPERATURE: 97.7 F | WEIGHT: 168 LBS | HEART RATE: 71 BPM

## 2022-09-25 DIAGNOSIS — N30.00 ACUTE CYSTITIS WITHOUT HEMATURIA: Primary | ICD-10-CM

## 2022-09-25 DIAGNOSIS — R35.0 URINARY FREQUENCY: ICD-10-CM

## 2022-09-25 DIAGNOSIS — C68.9 UROTHELIAL CARCINOMA (H): ICD-10-CM

## 2022-09-25 LAB
ALBUMIN UR-MCNC: NEGATIVE MG/DL
APPEARANCE UR: ABNORMAL
BACTERIA #/AREA URNS HPF: ABNORMAL /HPF
BILIRUB UR QL STRIP: NEGATIVE
COLOR UR AUTO: YELLOW
GLUCOSE UR STRIP-MCNC: NEGATIVE MG/DL
HGB UR QL STRIP: ABNORMAL
KETONES UR STRIP-MCNC: NEGATIVE MG/DL
LEUKOCYTE ESTERASE UR QL STRIP: ABNORMAL
NITRATE UR QL: NEGATIVE
PH UR STRIP: 5.5 [PH] (ref 5–7)
RBC #/AREA URNS AUTO: ABNORMAL /HPF
SP GR UR STRIP: <=1.005 (ref 1–1.03)
SQUAMOUS #/AREA URNS AUTO: ABNORMAL /LPF
UROBILINOGEN UR STRIP-ACNC: 0.2 E.U./DL
WBC #/AREA URNS AUTO: ABNORMAL /HPF

## 2022-09-25 PROCEDURE — 87186 SC STD MICRODIL/AGAR DIL: CPT | Performed by: PHYSICIAN ASSISTANT

## 2022-09-25 PROCEDURE — 99214 OFFICE O/P EST MOD 30 MIN: CPT | Performed by: PHYSICIAN ASSISTANT

## 2022-09-25 PROCEDURE — 81001 URINALYSIS AUTO W/SCOPE: CPT

## 2022-09-25 PROCEDURE — 87086 URINE CULTURE/COLONY COUNT: CPT | Performed by: PHYSICIAN ASSISTANT

## 2022-09-25 RX ORDER — CEFDINIR 300 MG/1
300 CAPSULE ORAL 2 TIMES DAILY
Qty: 20 CAPSULE | Refills: 0 | Status: SHIPPED | OUTPATIENT
Start: 2022-09-25 | End: 2023-01-18

## 2022-09-25 NOTE — PROGRESS NOTES
"Chief Complaint   Patient presents with     Urgent Care     Dysuria     Pt in clinic to have eval for recurrent dysuria and frequency.       ASSESSMENT/PLAN:  Dimple was seen today for urgent care and dysuria.    Diagnoses and all orders for this visit:    Acute cystitis without hematuria  -     cefdinir (OMNICEF) 300 MG capsule; Take 1 capsule (300 mg) by mouth 2 times daily for 10 days    Urinary frequency  -     UA macro with reflex to Microscopic and Culture - Clinc Collect  -     Urine Microscopic Exam  -     Urine Culture    Urothelial carcinoma (H)    UA consistent with UTI.  No signs of pyelonephritis today.  Start on cefdinir as above.    Efren Verde PA-C      SUBJECTIVE:  Dimple is a 89 year old female PMH significant for urothelial carcinoma s/p nephrectomy, malignant neoplasm of bladder, CHF, a-fib, HTN and stage III renal failure who presents to urgent care with new onset dysuria and frequency.  Patient does have a history of UTIs.  No abdominal pain back pain, fevers or chills.      ROS: Pertinent ROS neg other than the symptoms noted above in the HPI.     OBJECTIVE:  /59   Pulse 71   Temp 97.7  F (36.5  C) (Temporal)   Ht 1.448 m (4' 9\")   Wt 76.2 kg (168 lb)   SpO2 96%   BMI 36.35 kg/m     GENERAL: healthy, alert and no distress    BACK: no CVA tenderness, no paralumbar tenderness    DIAGNOSTICS    Results for orders placed or performed in visit on 09/25/22   UA macro with reflex to Microscopic and Culture - Clinc Collect     Status: Abnormal    Specimen: Urine, Midstream   Result Value Ref Range    Color Urine Yellow Colorless, Straw, Light Yellow, Yellow    Appearance Urine Cloudy (A) Clear    Glucose Urine Negative Negative mg/dL    Bilirubin Urine Negative Negative    Ketones Urine Negative Negative mg/dL    Specific Gravity Urine <=1.005 1.003 - 1.035    Blood Urine Small (A) Negative    pH Urine 5.5 5.0 - 7.0    Protein Albumin Urine Negative Negative mg/dL    Urobilinogen " Urine 0.2 0.2, 1.0 E.U./dL    Nitrite Urine Negative Negative    Leukocyte Esterase Urine Large (A) Negative   Urine Microscopic Exam     Status: Abnormal   Result Value Ref Range    Bacteria Urine Few (A) None Seen /HPF    RBC Urine None Seen 0-2 /HPF /HPF    WBC Urine  (A) 0-5 /HPF /HPF    Squamous Epithelials Urine Few (A) None Seen /LPF        Current Outpatient Medications   Medication     alendronate (FOSAMAX) 70 MG tablet     diltiazem ER (DILT-XR) 180 MG 24 hr capsule     ferrous sulfate 325 (65 Fe) MG TBEC EC tablet     furosemide (LASIX) 20 MG tablet     irbesartan (AVAPRO) 300 MG tablet     levofloxacin (LEVAQUIN) 500 MG tablet     lovastatin (MEVACOR) 40 MG tablet     methimazole (TAPAZOLE) 5 MG tablet     Omega-3 Fatty Acids (FISH OIL) 500 MG CAPS     omeprazole (PRILOSEC) 20 MG DR capsule     propranolol ER (INDERAL LA) 60 MG 24 hr capsule     RESTASIS 0.05 % ophthalmic emulsion     rivaroxaban ANTICOAGULANT (XARELTO) 10 MG TABS tablet     rOPINIRole (REQUIP) 0.25 MG tablet     sertraline (ZOLOFT) 100 MG tablet     traZODone (DESYREL) 50 MG tablet     cephALEXin (KEFLEX) 500 MG capsule     Current Facility-Administered Medications   Medication     lidocaine (XYLOCAINE) 2 % external gel      Patient Active Problem List   Diagnosis     Esophageal reflux     Restless leg syndrome     heat intolerance     Goiter     Disorder of bone and cartilage     Other psoriasis     perirectal cyst     Malignant melanoma of skin of trunk, except scrotum (H)     Nontoxic multinodular goiter     Hyperlipidemia LDL goal <130     Hypertension goal BP (blood pressure) < 140/90     Urinary incontinence     Hip arthritis     Polymyalgia rheumatica (H)     High risk medication use     Shoulder pain     Impaired fasting blood sugar     Chronic bilateral low back pain without sciatica     Obesity, unspecified obesity severity, unspecified obesity type     Iron deficiency anemia, unspecified iron deficiency anemia type      NSTEMI (non-ST elevated myocardial infarction) (H)     Stress-induced cardiomyopathy     A-fib (H)     Anxiety     Chronic systolic heart failure (H)     Urothelial carcinoma (H)     Hyperthyroidism     Hydronephrosis     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)     Graves disease     Encounter for antineoplastic chemotherapy     Primary localized osteoarthritis of right knee     Urothelial cancer (H)     Malignant neoplasm of overlapping sites of bladder (H)     Primary osteoarthritis of right knee     Chronic kidney disease, stage 3 (H)     Lipedematous scalp     Atrial fibrillation with rapid ventricular response (H)     bmi over 35 with comorbidities      Aneurysm of renal artery (H)     Osteopenia, unspecified location     Asthma      Past Medical History:   Diagnosis Date     CRESENCIO (acute kidney injury) (H) 9/11/2018     Arthritis      Asthma 2/9/2022     Calculus of kidney      Chemotherapy-induced neutropenia (H) 3/6/2019     Congestive heart failure (H)      Esophageal reflux      GERD (gastroesophageal reflux disease)      Hyperlipidemia LDL goal <130 5/9/2010     Malignant melanoma of skin of trunk, except scrotum (H)      Nonspecific abnormal finding     has living will 2004 -      Nontoxic multinodular goiter     no further eval /tx rec per pt     Osteopenia      Other psoriasis      Personal history of colonic polyps      PMR (polymyalgia rheumatica) (H)      Restless legs syndrome 7/11/2018     Stress-induced cardiomyopathy      Undiagnosed cardiac murmurs      Unspecified constipation      Unspecified essential hypertension      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     Past Surgical History:   Procedure Laterality Date     ARTHROPLASTY KNEE Right 11/9/2020    Procedure: ARTHROPLASTY, KNEE, TOTAL, RIGHT;  Surgeon: Edward Otero MD;  [facility]     BIOPSY       COLONOSCOPY  2014     COMBINED CYSTOSCOPY, INSERT STENT URETER(S) Bilateral 9/12/2018    Procedure: COMBINED CYSTOSCOPY, INSERT  STENT URETER(S);  Cystoscopy Bilateral ureteral Stent Placement.;  Surgeon: Justin Henry MD;  [facility]     COMBINED CYSTOSCOPY, RETROGRADES, URETEROSCOPY, INSERT STENT Bilateral 4/3/2018    Procedure: COMBINED CYSTOSCOPY, RETROGRADES, URETEROSCOPY, INSERT STENT;;  Surgeon: Stuart King MD;  [facility]     COMBINED CYSTOSCOPY, RETROGRADES, URETEROSCOPY, INSERT STENT Bilateral 9/10/2018    Procedure: COMBINED CYSTOSCOPY, RETROGRADES, URETEROSCOPY, INSERT STENT;  Cystoscopy, Bilateral Ureteroscopy, Bladder Biopsies, Retrogram Pyelograms, Ureteral Washings and brushings, cysview;  Surgeon: Stuart King MD;  [facility]     COMBINED CYSTOSCOPY, RETROGRADES, URETEROSCOPY, INSERT STENT Left 8/26/2020    Procedure: Cystoscopy, Left Ureteral Wash, Left ureteral Retrograde Pyelogram;  Surgeon: Justin Henry MD;  [facility]     CYSTOSCOPY, BIOPSY BLADDER INSTILL OPTICAL AGENT N/A 4/3/2018    Procedure: CYSTOSCOPY, BIOPSY BLADDER INSTILL OPTICAL AGENT;  Cystoscopy, Blue Light Cystoscopy, Bladder Biopsies, Bilateral Selective ureteral washings for Cytology, Bilateral Retrograde Pyelograms, Bilateral Ureteroscopy;  Surgeon: Stuart King MD;  [facility]     CYSTOSCOPY, BIOPSY BLADDER, COMBINED N/A 2/19/2018    Procedure: COMBINED CYSTOSCOPY, BIOPSY BLADDER;  Cystoscopy, Bladder Biopsy;  Surgeon: Kenna La MD;  [facility]     CYSTOSCOPY, BIOPSY BLADDER, COMBINED N/A 8/26/2020    Procedure: Cystoscopy, biopsy bladder, combined;  Surgeon: Justin Henry MD;  [facility]     CYSTOSCOPY, FULGURATE BLADDER TUMOR, COMBINED N/A 1/13/2020    Procedure: CYSTOSCOPY, WITH  bladder biopsies and fulguration;  Surgeon: Stuart King MD;  [facility]     CYSTOSCOPY, REMOVE STENT(S), COMBINED  11/13/2018    Procedure: Flexible Cystoscopy with Stent Removal;  Surgeon: Sturat King MD;  [facility]      DAVINCI LYMPHADENECTOMY N/A 11/13/2018    Procedure: Davinci Lymphadenectomy ;  Surgeon: Stuart King MD;  [facility]     DAVINCI NEPHROURETERECTOMY N/A 11/13/2018    Procedure: Right DaVinci Assisted Nephroureterectomy;  Surgeon: Stuart King MD;  [facility]     ENDOSCOPIC ULTRASOUND LOWER GASTROINTESTIONAL TRACT (GI) N/A 10/30/2015    Procedure: ENDOSCOPIC ULTRASOUND LOWER GASTROINTESTIONAL TRACT (GI);  Surgeon: Daniel Jean-Baptiste MD;  [facility]     EYE SURGERY  12/4/17     INSERT PORT VASCULAR ACCESS Right 12/19/2018    Procedure: Chest Port Placement - right;  Surgeon: Stuart Chavez PA-C;  [facility]     IR CHEST PORT PLACEMENT > 5 YRS OF AGE  12/19/2018     IR PORT REMOVAL RIGHT  6/26/2019     LAPAROSCOPIC CHOLECYSTECTOMY WITH CHOLANGIOGRAMS N/A 11/1/2015    Procedure: LAPAROSCOPIC CHOLECYSTECTOMY WITH CHOLANGIOGRAMS;  Surgeon: Tonie Warren MD;  [facility]     REMOVE PORT VASCULAR ACCESS Right 6/26/2019    Procedure: Right Port Removal;  Surgeon: Froilan Irizarry PA-C;  [facility]     SURGICAL HISTORY OF -   1996    malignant melanoma     SURGICAL HISTORY OF -   1968    thyroid nodule     SURGICAL HISTORY OF -       D & C     ZZC NONSPECIFIC PROCEDURE  2005    colonoscopy polyp repeat 2010     Family History   Problem Relation Age of Onset     Cancer Father         dec - esophageal and laryngeal     Heart Disease Mother      Respiratory Mother         dec     Breast Cancer Daughter      Other Cancer Daughter      Thyroid Disease Daughter      Asthma Daughter      Hyperlipidemia Son      Diabetes Son      Anesthesia Reaction No family hx of      Deep Vein Thrombosis (DVT) No family hx of      Social History     Tobacco Use     Smoking status: Never Smoker     Smokeless tobacco: Never Used   Substance Use Topics     Alcohol use: No     Alcohol/week: 0.0 standard drinks     Comment: rare              The plan of care was  discussed with the patient. They understand and agree with the course of treatment prescribed. A printed summary was given including instructions and medications.  The use of Dragon[employer] dictation services may have been used to construct the content in this note; any grammatical or spelling errors are non-intentional. Please contact the author of this note directly if you are in need of any clarification.

## 2022-09-28 LAB — BACTERIA UR CULT: ABNORMAL

## 2022-10-08 ENCOUNTER — APPOINTMENT (OUTPATIENT)
Dept: CT IMAGING | Facility: CLINIC | Age: 87
End: 2022-10-08
Attending: EMERGENCY MEDICINE
Payer: MEDICARE

## 2022-10-08 ENCOUNTER — HOSPITAL ENCOUNTER (EMERGENCY)
Facility: CLINIC | Age: 87
Discharge: HOME | End: 2022-10-08
Attending: EMERGENCY MEDICINE | Admitting: EMERGENCY MEDICINE
Payer: MEDICARE

## 2022-10-08 VITALS
HEART RATE: 88 BPM | OXYGEN SATURATION: 96 % | RESPIRATION RATE: 18 BRPM | SYSTOLIC BLOOD PRESSURE: 155 MMHG | TEMPERATURE: 97.9 F | DIASTOLIC BLOOD PRESSURE: 79 MMHG

## 2022-10-08 DIAGNOSIS — N39.0 URINARY TRACT INFECTION WITHOUT HEMATURIA, SITE UNSPECIFIED: ICD-10-CM

## 2022-10-08 LAB
ALBUMIN UR-MCNC: NEGATIVE MG/DL
ANION GAP SERPL CALCULATED.3IONS-SCNC: 11 MMOL/L (ref 7–15)
APPEARANCE UR: CLEAR
BASOPHILS # BLD AUTO: 0.1 10E3/UL (ref 0–0.2)
BASOPHILS NFR BLD AUTO: 1 %
BILIRUB UR QL STRIP: NEGATIVE
BUN SERPL-MCNC: 19 MG/DL (ref 8–23)
CALCIUM SERPL-MCNC: 9.6 MG/DL (ref 8.8–10.2)
CHLORIDE SERPL-SCNC: 96 MMOL/L (ref 98–107)
COLOR UR AUTO: ABNORMAL
CREAT SERPL-MCNC: 0.91 MG/DL (ref 0.51–0.95)
CRP SERPL-MCNC: 10.4 MG/L
DEPRECATED HCO3 PLAS-SCNC: 26 MMOL/L (ref 22–29)
EOSINOPHIL # BLD AUTO: 0.2 10E3/UL (ref 0–0.7)
EOSINOPHIL NFR BLD AUTO: 2 %
ERYTHROCYTE [DISTWIDTH] IN BLOOD BY AUTOMATED COUNT: 14.2 % (ref 10–15)
ERYTHROCYTE [SEDIMENTATION RATE] IN BLOOD BY WESTERGREN METHOD: 29 MM/HR (ref 0–30)
GFR SERPL CREATININE-BSD FRML MDRD: 60 ML/MIN/1.73M2
GLUCOSE SERPL-MCNC: 104 MG/DL (ref 70–99)
GLUCOSE UR STRIP-MCNC: NEGATIVE MG/DL
HCT VFR BLD AUTO: 37.3 % (ref 35–47)
HGB BLD-MCNC: 12 G/DL (ref 11.7–15.7)
HGB UR QL STRIP: ABNORMAL
IMM GRANULOCYTES # BLD: 0.1 10E3/UL
IMM GRANULOCYTES NFR BLD: 1 %
KETONES UR STRIP-MCNC: NEGATIVE MG/DL
LEUKOCYTE ESTERASE UR QL STRIP: ABNORMAL
LYMPHOCYTES # BLD AUTO: 1.5 10E3/UL (ref 0.8–5.3)
LYMPHOCYTES NFR BLD AUTO: 15 %
MCH RBC QN AUTO: 31 PG (ref 26.5–33)
MCHC RBC AUTO-ENTMCNC: 32.2 G/DL (ref 31.5–36.5)
MCV RBC AUTO: 96 FL (ref 78–100)
MONOCYTES # BLD AUTO: 1 10E3/UL (ref 0–1.3)
MONOCYTES NFR BLD AUTO: 9 %
NEUTROPHILS # BLD AUTO: 7.8 10E3/UL (ref 1.6–8.3)
NEUTROPHILS NFR BLD AUTO: 72 %
NITRATE UR QL: NEGATIVE
NRBC # BLD AUTO: 0 10E3/UL
NRBC BLD AUTO-RTO: 0 /100
PH UR STRIP: 5.5 [PH] (ref 5–7)
PLATELET # BLD AUTO: 228 10E3/UL (ref 150–450)
POTASSIUM SERPL-SCNC: 4 MMOL/L (ref 3.4–5.3)
RBC # BLD AUTO: 3.87 10E6/UL (ref 3.8–5.2)
RBC URINE: <1 /HPF
SODIUM SERPL-SCNC: 133 MMOL/L (ref 136–145)
SP GR UR STRIP: 1.01 (ref 1–1.03)
UROBILINOGEN UR STRIP-MCNC: NORMAL MG/DL
WBC # BLD AUTO: 10.6 10E3/UL (ref 4–11)
WBC URINE: 113 /HPF

## 2022-10-08 PROCEDURE — 81001 URINALYSIS AUTO W/SCOPE: CPT | Performed by: EMERGENCY MEDICINE

## 2022-10-08 PROCEDURE — 87086 URINE CULTURE/COLONY COUNT: CPT | Performed by: EMERGENCY MEDICINE

## 2022-10-08 PROCEDURE — 96365 THER/PROPH/DIAG IV INF INIT: CPT | Performed by: EMERGENCY MEDICINE

## 2022-10-08 PROCEDURE — 86140 C-REACTIVE PROTEIN: CPT | Performed by: EMERGENCY MEDICINE

## 2022-10-08 PROCEDURE — G1010 CDSM STANSON: HCPCS | Mod: GC | Performed by: RADIOLOGY

## 2022-10-08 PROCEDURE — 85025 COMPLETE CBC W/AUTO DIFF WBC: CPT | Performed by: EMERGENCY MEDICINE

## 2022-10-08 PROCEDURE — 74176 CT ABD & PELVIS W/O CONTRAST: CPT | Mod: 26 | Performed by: RADIOLOGY

## 2022-10-08 PROCEDURE — 80048 BASIC METABOLIC PNL TOTAL CA: CPT | Performed by: EMERGENCY MEDICINE

## 2022-10-08 PROCEDURE — 99283 EMERGENCY DEPT VISIT LOW MDM: CPT | Performed by: EMERGENCY MEDICINE

## 2022-10-08 PROCEDURE — 85652 RBC SED RATE AUTOMATED: CPT | Performed by: EMERGENCY MEDICINE

## 2022-10-08 PROCEDURE — 99285 EMERGENCY DEPT VISIT HI MDM: CPT | Mod: 25 | Performed by: EMERGENCY MEDICINE

## 2022-10-08 PROCEDURE — 250N000011 HC RX IP 250 OP 636: Performed by: EMERGENCY MEDICINE

## 2022-10-08 PROCEDURE — 36415 COLL VENOUS BLD VENIPUNCTURE: CPT | Performed by: EMERGENCY MEDICINE

## 2022-10-08 PROCEDURE — G1010 CDSM STANSON: HCPCS

## 2022-10-08 RX ORDER — NITROFURANTOIN 25; 75 MG/1; MG/1
100 CAPSULE ORAL 2 TIMES DAILY
Qty: 10 CAPSULE | Refills: 0 | Status: SHIPPED | OUTPATIENT
Start: 2022-10-08 | End: 2022-10-13

## 2022-10-08 RX ORDER — CEFTRIAXONE 1 G/1
1 INJECTION, POWDER, FOR SOLUTION INTRAMUSCULAR; INTRAVENOUS ONCE
Status: COMPLETED | OUTPATIENT
Start: 2022-10-08 | End: 2022-10-08

## 2022-10-08 RX ADMIN — CEFTRIAXONE SODIUM 1 G: 1 INJECTION, POWDER, FOR SOLUTION INTRAMUSCULAR; INTRAVENOUS at 15:23

## 2022-10-08 ASSESSMENT — ENCOUNTER SYMPTOMS
COLOR CHANGE: 0
EYE REDNESS: 0
HEMATURIA: 0
NECK STIFFNESS: 0
SHORTNESS OF BREATH: 0
ARTHRALGIAS: 0
VOMITING: 0
DIFFICULTY URINATING: 0
HEADACHES: 0
ADENOPATHY: 0
CHILLS: 0
ABDOMINAL PAIN: 0
NAUSEA: 0
LIGHT-HEADEDNESS: 0
DYSURIA: 1
FLANK PAIN: 0
BRUISES/BLEEDS EASILY: 0
POLYDIPSIA: 0
CONFUSION: 0
FEVER: 0

## 2022-10-08 ASSESSMENT — ACTIVITIES OF DAILY LIVING (ADL): ADLS_ACUITY_SCORE: 37

## 2022-10-08 NOTE — DISCHARGE INSTRUCTIONS
"Please make an appointment to follow up with Your Primary Care Provider in 5-7 days unless symptoms completely resolve.  If your symptoms worsen or you develop a fever 100.4  F or higher please come back to the emergency department.        *BLADDER INFECTION,Female (Adult)    A bladder infection (\"cystitis\" or \"UTI\") usually causes a constant urge to urinate and a burning when passing urine. Urine may be cloudy, smelly or dark. There may be pain in the lower abdomen. A bladder infection occurs when bacteria from the vaginal area enter the bladder opening (urethra). This can occur from sexual intercourse, wearing tight clothing, dehydration and other factors.  HOME CARE:  Drink lots of fluids (at least 6-8 glasses a day, unless you must restrict fluids for other medical reasons). This will force the medicine into your urinary system and flush the bacteria out of your body. Cranberry juice has been shown to help clear out the bacteria.  Avoid sexual intercourse until your symptoms are gone.  A bladder infection is treated with antibiotics. You may also be given Pyridium (generic = phenazopyridine) to reduce the burning sensation. This medicine will cause your urine to become a bright orange color. The orange urine may stain clothing. You may wear a pad or panty-liner to protect clothing.  PREVENTING FUTURE INFECTIONS:  Always wipe from front to back after a bowel movement.  Keep the genital area clean and dry.  Drink plenty of fluids each day to avoid dehydration.  Urinate right after intercourse to flush out the bladder.  Wear cotton underwear and cotton-lined panty hose; avoid tight-fitting pants.  If you are on birth control pills and are having frequent bladder infections, discuss with your doctor.  FOLLOW UP: Return to this facility or see your doctor if ALL symptoms are not gone after three days of treatment.  GET PROMPT MEDICAL ATTENTION if any of the following occur:  Fever over 101 F (38.3 C)  No improvement " by the third day of treatment  Increasing back or abdominal pain  Repeated vomiting; unable to keep medicine down  Weakness, dizziness or fainting  Vaginal discharge  Pain, redness or swelling in the labia (outer vaginal area)    [id number] [employer], [address] [place] [address], [place] [address]. All rights reserved. This information is not intended as a substitute for professional medical care. Always follow your healthcare professional's instructions.

## 2022-10-08 NOTE — ED NOTES
ED Triage Provider Note  [facility]  Encounter Date: Oct 8, 2022    History:  No chief complaint on file.    Dimple Oviedo is a 89 year old female with a PMH significant for urothelial carcinoma s/p nephrectomy, malignant neoplasm of bladder, CHF, a-fib, HTN  who presents to the ED with diagnosis of UTI on month ago and persistent urinary symptoms despite two rounds of antibiotics. She reports transient R flank pain 2 days ago, which she thought might be a recurrent kidney stone.      Review of Systems:  Denies fevers, chills, nor dysuria    Exam:  /86   Pulse 73   Temp 97.9  F (36.6  C) (Oral)   Resp 18   SpO2 96%   General: No acute distress. Appears stated age.   Cardio: Regular rate, extremities well perfused  Resp: Normal work of breathing, grossly normal respiratory rate  Neuro: Alert. CN II-XII grossly intact. Grossly intact strength.         Medical Decision Making:  Patient arriving to the ED with problem as above. A medical screening exam was performed. IV, bloodwork, U/A, CT A/P non con orders initiated from Triage. The patient is appropriate to wait in triage.      Radames Adames MD on 10/8/2022 at 1:20 PM     Radames Adames MD  10/08/22 1323

## 2022-10-08 NOTE — ED TRIAGE NOTES
Presents to ER with C/O UTI symptoms. H/O bladder cancer with frequent UTI's. No fever, chills, nausea or vomiting     Triage Assessment     Row Name 10/08/22 [id number]       Triage Assessment (Adult)    Airway WDL WDL       Respiratory WDL    Respiratory WDL WDL       Skin Circulation/Temperature WDL    Skin Circulation/Temperature WDL WDL       Cardiac WDL    Cardiac WDL WDL       Peripheral/Neurovascular WDL    Peripheral Neurovascular WDL WDL       Cognitive/Neuro/Behavioral WDL    Cognitive/Neuro/Behavioral WDL WDL       [place] Coma Scale    Best Eye Response 4-->(E4) spontaneous    Best Motor Response 6-->(M6) obeys commands    Best Verbal Response 5-->(V5) oriented    [place] Coma Scale Score 15

## 2022-10-08 NOTE — ED PROVIDER NOTES
ED Provider Note  [facility] EMERGENCY DEPARTMENT ([facility])  10/08/22      History     Chief Complaint   Patient presents with     Dysuria     The history is provided by the patient and medical records.     Dimple Ovideo is a 89 year old female with a PMH significant for urothelial carcinoma s/p nephrectomy, malignant neoplasm of bladder, CHF, a-fib, HTN  who presents to the ED for evaluation of UTI symptoms.  The patient reports cloudy urine and dysuria.  She reports being treated with 2 antibiotics in the past.  Symptoms resolved after treatment but then came back.  No fevers, vomiting, or current flank pain.  She thinks she may have passed a kidney stone and states she was told she had a kidney stone several years ago and is unsure if she ever passed it.  No other concerns or complaints.    Per chart review, patient was seen [facility] ED 09/05/2022 for a UTI and given Omnicef for 10 days.  She was then seen at urgent care 09/25/2022 for dysuria and frequency and given another 10-day course of Omnicef.    Past Medical History  Past Medical History:   Diagnosis Date     CRESENCIO (acute kidney injury) (H) 9/11/2018     Arthritis      Asthma 2/9/2022     Calculus of kidney      Chemotherapy-induced neutropenia (H) 3/6/2019     Congestive heart failure (H)      Esophageal reflux      GERD (gastroesophageal reflux disease)      Hyperlipidemia LDL goal <130 5/9/2010     Malignant melanoma of skin of trunk, except scrotum (H)      Nonspecific abnormal finding     has living will 2004 -      Nontoxic multinodular goiter     no further eval /tx rec per pt     Osteopenia      Other psoriasis      Personal history of colonic polyps      PMR (polymyalgia rheumatica) (H)      Restless legs syndrome 7/11/2018     Stress-induced cardiomyopathy      Undiagnosed cardiac murmurs      Unspecified constipation      Unspecified essential hypertension      Urothelial carcinoma (H)  3/22/2018     Past Surgical History:   Procedure Laterality Date     ARTHROPLASTY KNEE Right 11/9/2020    Procedure: ARTHROPLASTY, KNEE, TOTAL, RIGHT;  Surgeon: Edward Otero MD;  [facility] OR     BIOPSY       COLONOSCOPY  2014     COMBINED CYSTOSCOPY, INSERT STENT URETER(S) Bilateral 9/12/2018    Procedure: COMBINED CYSTOSCOPY, INSERT STENT URETER(S);  Cystoscopy Bilateral ureteral Stent Placement.;  Surgeon: Justin Henry MD;  [facility]     COMBINED CYSTOSCOPY, RETROGRADES, URETEROSCOPY, INSERT STENT Bilateral 4/3/2018    Procedure: COMBINED CYSTOSCOPY, RETROGRADES, URETEROSCOPY, INSERT STENT;;  Surgeon: Stuart King MD;  [facility]     COMBINED CYSTOSCOPY, RETROGRADES, URETEROSCOPY, INSERT STENT Bilateral 9/10/2018    Procedure: COMBINED CYSTOSCOPY, RETROGRADES, URETEROSCOPY, INSERT STENT;  Cystoscopy, Bilateral Ureteroscopy, Bladder Biopsies, Retrogram Pyelograms, Ureteral Washings and brushings, cysview;  Surgeon: Stuart King MD;  [facility]     COMBINED CYSTOSCOPY, RETROGRADES, URETEROSCOPY, INSERT STENT Left 8/26/2020    Procedure: Cystoscopy, Left Ureteral Wash, Left ureteral Retrograde Pyelogram;  Surgeon: Justin Henry MD;  [facility]     CYSTOSCOPY, BIOPSY BLADDER INSTILL OPTICAL AGENT N/A 4/3/2018    Procedure: CYSTOSCOPY, BIOPSY BLADDER INSTILL OPTICAL AGENT;  Cystoscopy, Blue Light Cystoscopy, Bladder Biopsies, Bilateral Selective ureteral washings for Cytology, Bilateral Retrograde Pyelograms, Bilateral Ureteroscopy;  Surgeon: Stuart King MD;  [facility]     CYSTOSCOPY, BIOPSY BLADDER, COMBINED N/A 2/19/2018    Procedure: COMBINED CYSTOSCOPY, BIOPSY BLADDER;  Cystoscopy, Bladder Biopsy;  Surgeon: Kenna La MD;  [facility]     CYSTOSCOPY, BIOPSY BLADDER, COMBINED N/A 8/26/2020    Procedure: Cystoscopy, biopsy bladder, combined;  Surgeon: Justin Henry MD;  [facility]      CYSTOSCOPY, FULGURATE BLADDER TUMOR, COMBINED N/A 1/13/2020    Procedure: CYSTOSCOPY, WITH  bladder biopsies and fulguration;  Surgeon: Stuart King MD;  [facility]     CYSTOSCOPY, REMOVE STENT(S), COMBINED  11/13/2018    Procedure: Flexible Cystoscopy with Stent Removal;  Surgeon: Stuart King MD;  [facility]     DAVINCI LYMPHADENECTOMY N/A 11/13/2018    Procedure: Davinci Lymphadenectomy ;  Surgeon: Stuart King MD;  [facility]     DAVINCI NEPHROURETERECTOMY N/A 11/13/2018    Procedure: Right DaVinci Assisted Nephroureterectomy;  Surgeon: Stuart King MD;  [facility]     ENDOSCOPIC ULTRASOUND LOWER GASTROINTESTIONAL TRACT (GI) N/A 10/30/2015    Procedure: ENDOSCOPIC ULTRASOUND LOWER GASTROINTESTIONAL TRACT (GI);  Surgeon: Daniel Jean-Baptiste MD;  [facility]     EYE SURGERY  12/4/17     INSERT PORT VASCULAR ACCESS Right 12/19/2018    Procedure: Chest Port Placement - right;  Surgeon: Stuart Chavez PA-C;  [facility]     IR CHEST PORT PLACEMENT > 5 YRS OF AGE  12/19/2018     IR PORT REMOVAL RIGHT  6/26/2019     LAPAROSCOPIC CHOLECYSTECTOMY WITH CHOLANGIOGRAMS N/A 11/1/2015    Procedure: LAPAROSCOPIC CHOLECYSTECTOMY WITH CHOLANGIOGRAMS;  Surgeon: Tonie Warren MD;  [facility]     REMOVE PORT VASCULAR ACCESS Right 6/26/2019    Procedure: Right Port Removal;  Surgeon: Froilan Irizarry PA-C;  [facility]     SURGICAL HISTORY OF -   1996    malignant melanoma     SURGICAL HISTORY OF -   1968    thyroid nodule     SURGICAL HISTORY OF -       D & C     ZZC NONSPECIFIC PROCEDURE  2005    colonoscopy polyp repeat 2010     nitroFURantoin macrocrystal-monohydrate (MACROBID) 100 MG capsule  alendronate (FOSAMAX) 70 MG tablet  cephALEXin (KEFLEX) 500 MG capsule  diltiazem ER (DILT-XR) 180 MG 24 hr capsule  ferrous sulfate 325 (65 Fe) MG TBEC EC tablet  furosemide (LASIX) 20 MG tablet  irbesartan (AVAPRO) 300 MG  tablet  levofloxacin (LEVAQUIN) 500 MG tablet  lovastatin (MEVACOR) 40 MG tablet  methimazole (TAPAZOLE) 5 MG tablet  Omega-3 Fatty Acids (FISH OIL) 500 MG CAPS  omeprazole (PRILOSEC) 20 MG DR capsule  propranolol ER (INDERAL LA) 60 MG 24 hr capsule  RESTASIS 0.05 % ophthalmic emulsion  rivaroxaban ANTICOAGULANT (XARELTO) 10 MG TABS tablet  rOPINIRole (REQUIP) 0.25 MG tablet  sertraline (ZOLOFT) 100 MG tablet  traZODone (DESYREL) 50 MG tablet      Allergies   Allergen Reactions     Codeine      Family History  Family History   Problem Relation Age of Onset     Cancer Father         dec - esophageal and laryngeal     Heart Disease Mother      Respiratory Mother         dec     Breast Cancer Daughter      Other Cancer Daughter      Thyroid Disease Daughter      Asthma Daughter      Hyperlipidemia Son      Diabetes Son      Anesthesia Reaction No family hx of      Deep Vein Thrombosis (DVT) No family hx of      Social History   Social History     Tobacco Use     Smoking status: Never Smoker     Smokeless tobacco: Never Used   Vaping Use     Vaping Use: Never used   Substance Use Topics     Alcohol use: No     Alcohol/week: 0.0 standard drinks     Comment: rare     Drug use: No      Past medical history, past surgical history, medications, allergies, family history, and social history were reviewed with the patient. No additional pertinent items.       Review of Systems   Constitutional: Negative for chills and fever.   HENT: Negative for congestion.    Eyes: Negative for redness.   Respiratory: Negative for shortness of breath.    Cardiovascular: Negative for chest pain.   Gastrointestinal: Negative for abdominal pain, nausea and vomiting.   Endocrine: Negative for polydipsia and polyuria.   Genitourinary: Positive for dysuria and urgency. Negative for difficulty urinating, flank pain, hematuria, pelvic pain and vaginal discharge.   Musculoskeletal: Negative for arthralgias and neck stiffness.   Skin: Negative for  color change.   Allergic/Immunologic: Negative for immunocompromised state.   Neurological: Negative for light-headedness and headaches.   Hematological: Negative for adenopathy. Does not bruise/bleed easily.   Psychiatric/Behavioral: Negative for confusion.   All other systems reviewed and are negative.    A complete review of systems was performed with pertinent positives and negatives noted in the HPI, and all other systems negative.    Physical Exam   BP: 121/86  Pulse: 73  Temp: 97.9  F (36.6  C)  Resp: 18  SpO2: 96 %  Physical Exam  Vitals and nursing note reviewed.   Constitutional:       General: She is not in acute distress.     Appearance: She is not ill-appearing, toxic-appearing or diaphoretic.   HENT:      Head: Normocephalic and atraumatic.      Mouth/Throat:      Pharynx: No oropharyngeal exudate.   Eyes:      General: No scleral icterus.     Extraocular Movements: Extraocular movements intact.      Conjunctiva/sclera: Conjunctivae normal.   Cardiovascular:      Rate and Rhythm: Normal rate.      Pulses: Normal pulses.      Heart sounds: Normal heart sounds.   Pulmonary:      Effort: Pulmonary effort is normal. No respiratory distress.      Breath sounds: Normal breath sounds.   Abdominal:      Palpations: Abdomen is soft.      Tenderness: There is no abdominal tenderness. There is no right CVA tenderness, left CVA tenderness, guarding or rebound.   Musculoskeletal:         General: No tenderness. Normal range of motion.      Cervical back: Normal range of motion and neck supple.   Skin:     General: Skin is warm.      Findings: No rash.   Neurological:      General: No focal deficit present.      Mental Status: She is alert and oriented to person, place, and time.      Cranial Nerves: No cranial nerve deficit.      Coordination: Coordination normal.   Psychiatric:         Mood and Affect: Mood normal.         Behavior: Behavior normal.         Thought Content: Thought content normal.          Judgment: Judgment normal.         ED Course     3:01 PM  The patient was seen and examined by Jojo Moore MD in Room ED21.     Procedures                   Results for orders placed or performed during the hospital encounter of 10/08/22   Abd/pelvis CT no contrast - Stone Protocol     Status: None    Narrative    EXAMINATION: CT ABDOMEN PELVIS W/O CONTRAST, 10/8/2022 3:22 PM    TECHNIQUE:  Helical CT images from the lung bases through the  symphysis pubis were obtained without IV contrast.    COMPARISON: 5/20/2022    HISTORY: R flank pain, h/o stones, bladder CA, UTI    FINDINGS:    Lung bases: Slight mosaic attenuation demonstrated with some septal  thickening similar to prior CT, possibly due to small airways disease.  Hiatal hernia.    Liver: Normal parenchymal attenuation without focal mass.  Biliary system: Cholecystectomy.. No intrahepatic or extrahepatic  biliary ductal dilatation.  Pancreas: No focal mass or dilation of the main pancreatic duct.  Spleen: Within normal limits. Splenule along the pancreatic tail.  Adrenal glands: Within normal limits.  Kidneys: Surgical changes of right nephrectomy. Nonobstructing  calculus in the interpolar region of the left kidney. No ureteral  stone. 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 Enhancement not  evaluated on this noncontrast study.  Bladder: Slight wall thickening with mild stranding along the anterior  bladder similar to prior CT.  Reproductive organs: Within normal limits.  GI: Nondilated large and small bowel. The appendix is normal.  Lymph nodes: No intra-abdominal or pelvic lymphadenopathy.  Vasculature: Limited. Vascular calcifications.    Bones and soft tissues: No suspicious soft tissue mass or fluid  collection. No suspicious osseous lesion. Degenerative changes in the  spine.      Impression    IMPRESSION: .   1. No acute findings in abdomen or pelvis. No obstructing stone.  Normal appendix.  2. Nonobstructing left renal calculus.  3. Stable  postsurgical changes of right nephrectomy and  cholecystectomy.  4. 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    I have personally reviewed the examination and initial interpretation  and I agree with the findings.    JUDY HOOVER MD         SYSTEM ID:  [id number]   Basic metabolic panel     Status: Abnormal   Result Value Ref Range    Sodium 133 (L) 136 - 145 mmol/L    Potassium 4.0 3.4 - 5.3 mmol/L    Chloride 96 (L) 98 - 107 mmol/L    Carbon Dioxide (CO2) 26 22 - 29 mmol/L    Anion Gap 11 7 - 15 mmol/L    Urea Nitrogen 19.0 8.0 - 23.0 mg/dL    Creatinine 0.91 0.51 - 0.95 mg/dL    Calcium 9.6 8.8 - 10.2 mg/dL    Glucose 104 (H) 70 - 99 mg/dL    GFR Estimate 60 (L) >60 mL/min/1.73m2   CRP inflammation     Status: Abnormal   Result Value Ref Range    CRP Inflammation 10.40 (H) <5.00 mg/L   UA with Microscopic reflex to Culture     Status: Abnormal    Specimen: Urine, Midstream   Result Value Ref Range    Color Urine Light Yellow Colorless, Straw, Light Yellow, Yellow    Appearance Urine Clear Clear    Glucose Urine Negative Negative mg/dL    Bilirubin Urine Negative Negative    Ketones Urine Negative Negative mg/dL    Specific Gravity Urine 1.006 1.003 - 1.035    Blood Urine Trace (A) Negative    pH Urine 5.5 5.0 - 7.0    Protein Albumin Urine Negative Negative mg/dL    Urobilinogen Urine Normal Normal, 2.0 mg/dL    Nitrite Urine Negative Negative    Leukocyte Esterase Urine Large (A) Negative    RBC Urine <1 <=2 /HPF    WBC Urine 113 (H) <=5 /HPF    Narrative    Urine Culture ordered based on laboratory criteria   Erythrocyte sedimentation rate auto     Status: Normal   Result Value Ref Range    Erythrocyte Sedimentation Rate 29 0 - 30 mm/hr   CBC with platelets and differential     Status: None   Result Value Ref Range    WBC Count 10.6 4.0 - 11.0 10e3/uL    RBC Count 3.87 3.80 - 5.20 10e6/uL    Hemoglobin 12.0 11.7 - 15.7 g/dL    Hematocrit 37.3 35.0 - 47.0 %    MCV 96 78 - 100 fL    MCH 31.0 26.5 - 33.0 pg     MCHC 32.2 31.5 - 36.5 g/dL    RDW 14.2 10.0 - 15.0 %    Platelet Count 228 150 - 450 10e3/uL    % Neutrophils 72 %    % Lymphocytes 15 %    % Monocytes 9 %    % Eosinophils 2 %    % Basophils 1 %    % Immature Granulocytes 1 %    NRBCs per 100 WBC 0 <1 /100    Absolute Neutrophils 7.8 1.6 - 8.3 10e3/uL    Absolute Lymphocytes 1.5 0.8 - 5.3 10e3/uL    Absolute Monocytes 1.0 0.0 - 1.3 10e3/uL    Absolute Eosinophils 0.2 0.0 - 0.7 10e3/uL    Absolute Basophils 0.1 0.0 - 0.2 10e3/uL    Absolute Immature Granulocytes 0.1 <=0.4 10e3/uL    Absolute NRBCs 0.0 10e3/uL   CBC with platelets differential     Status: None    Narrative    The following orders were created for panel order CBC with platelets differential.  Procedure                               Abnormality         Status                     ---------                               -----------         ------                     CBC with platelets and d...[[id number]]                      Final result                 Please view results for these tests on the individual orders.     Medications   cefTRIAXone (ROCEPHIN) 1 g vial to attach to  mL bag for ADULTS or NS 50 mL bag for PEDS (0 g Intravenous Stopped 10/8/22 1548)        Assessments & Plan (with Medical Decision Making)     89-year-old woman presents to the emergency department with dysuria.  Differential diagnosis: Urinary tract infection, pyelonephritis, infected kidney stone.    After thorough history and physical exam, patient appears to be in no acute distress.  Triage provider has obtained laboratory studies and ordered CT abdomen/pelvis for further evaluation.  Laboratory studies returned with no leukocytosis, WBC is normal at 10,600.  There is no anemia, hemoglobin is normal at 12.0.  ESR is normal and CRP slightly elevated 10.4.  Creatinine is normal at 0.91.  Urinalysis shows significantly elevated WBCs in the urine and patient likely has urinary tract infection.  She will be given dose of IV  ceftriaxone in the Emergency Department.  I reviewed her CT and I read the radiology report; there is no evidence of obstructing kidney stone.  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  Patient is status post right nephrectomy.  At this point she is stable for discharge with prescription for nitrofurantoin and outpatient follow-up.  She is happy with this plan.  She also agrees to return to the emergency department if her symptoms worsen.    This part of the medical record was transcribed by Rg Morrison Scribe, from a dictation done by Jojo Moore MD.     I have reviewed the nursing notes. I have reviewed the findings, diagnosis, plan and need for follow up with the patient.    Discharge Medication List as of 10/8/2022  3:49 PM      START taking these medications    Details   nitroFURantoin macrocrystal-monohydrate (MACROBID) 100 MG capsule Take 1 capsule (100 mg) by mouth 2 times daily for 5 days, Disp-10 capsule, R-0, E-Prescribe             Final diagnoses:   Urinary tract infection without hematuria, site unspecified     I, Crista Nicole, am serving as a trained medical scribe to document services personally performed by Jojo Moore MD based on the provider's statements to me on October 8, 2022.  This document has been checked and approved by the attending provider.    I, Jojo Moore MD, was physically present and have reviewed and verified the accuracy of this note documented by rg Morrison scribe.      --  Jojo Moore MD  [facility] EMERGENCY DEPARTMENT  10/8/2022     Jojo Moore MD  10/08/22 0037

## 2022-10-10 LAB — BACTERIA UR CULT: NORMAL

## 2022-10-10 NOTE — RESULT ENCOUNTER NOTE
Final urine culture report is negative.  Adult Negative Urine culture parameters per protocol: Any # Urogenital single or mixed organism, <10,000 col/ml single organism (cath/midstream), and > 3 organisms (No susceptibilities performed).  [facility] Emergency Dept discharge antibiotic prescribed (If applicable): Macrobid   Treatment recommendations per [facility] ED Lab Result Urine Culture protocol.

## 2022-10-13 DIAGNOSIS — G47.00 INSOMNIA, UNSPECIFIED TYPE: ICD-10-CM

## 2022-10-14 RX ORDER — TRAZODONE HYDROCHLORIDE 50 MG/1
TABLET, FILM COATED ORAL
Qty: 45 TABLET | Refills: 3 | Status: SHIPPED | OUTPATIENT
Start: 2022-10-14 | End: 2024-03-01

## 2022-10-14 NOTE — TELEPHONE ENCOUNTER
Prescription approved per [facility] Refill Protocol.    Virginia Kent, BSN RN  [facility]

## 2022-10-21 ENCOUNTER — HOSPITAL ENCOUNTER (OUTPATIENT)
Dept: PHYSICAL THERAPY | Facility: CLINIC | Age: 87
Setting detail: THERAPIES SERIES
Discharge: HOME | End: 2022-10-21
Attending: FAMILY MEDICINE
Payer: MEDICARE

## 2022-10-21 DIAGNOSIS — I89.0 LYMPHEDEMA: Primary | ICD-10-CM

## 2022-10-21 PROCEDURE — 97140 MANUAL THERAPY 1/> REGIONS: CPT | Mod: GP | Performed by: PHYSICAL THERAPIST

## 2022-10-21 NOTE — PROGRESS NOTES
[facility]    Outpatient Physical Therapy Discharge Note  Patient: Dimple Oviedo  : 1933    Beginning/End Dates of Reporting Period:  2022 to 10/21/22    Referring Provider: Pop Zapien MD    Therapy Diagnosis: Lymphedema     Client Self Report: Pt arriving late after accidently going to the wrong location. Pt denies new concerns, has not been able to wear compression abril as it made her stomach hurt. Had taco salad which was really salty last night so feels she is more swollen today    Objective Measurements:  Objective Measure: Edema  Details: Soft puffy skin texture with hyperplasia, no pitting present. skin mildly dry, but in good condition otherwise without redness or s/s of infection  Objective Measure: Volume  Details: Increased slightly today, pt reports salty dinner last night and can tell difference (went out to eat): RLE 3549.21mL ; LLE 3587.54 mL     Outcome Measures (most recent score):  Lymphedema Life Impact Scale (score range 0-72). A higher score indicates greater impairment.: 15    Goals:  Goal Identifier Compression   Goal Description Pt will obtain and utilize appropriate BLE compression to prevent progression of BLE edema   Target Date 10/16/22   Date Met  10/21/22   Progress (detail required for progress note): Has new compression socks, can order new in same size with family assist as needed every 6 months     Goal Identifier HEP   Goal Description Pt will demonstrate accurate completion of HEP including self massage, elevation, compression and exercise to reduce and maintain BLE edema   Target Date 10/16/22   Date Met  10/21/22   Progress (detail required for progress note): wears compression daily, does do some exercise and self massage as needed     Goal Identifier LLIS   Goal Description Pt will have a reduced impact of edema on daily life with LLIS of  less than 15   Target Date 10/16/22   Date Met   10/21/22   Progress (detail required for progress note): scoring 15     Plan:  Discharge from therapy. Pt has met goals with HEP in place for long term management of BLE edema. She is consistent with HEP of compression, self massage and drainage exercise. With family assist she is able to appropriately order new compression every 6 months to maintain good control.     Discharge:    Reason for Discharge: Patient has met all goals.    Equipment Issued: Recommendations for compression, HEP    Discharge Plan: Patient to continue home program.

## 2022-10-26 DIAGNOSIS — I10 ESSENTIAL HYPERTENSION WITH GOAL BLOOD PRESSURE LESS THAN 140/90: ICD-10-CM

## 2022-10-27 RX ORDER — IRBESARTAN 300 MG/1
TABLET ORAL
Qty: 90 TABLET | Refills: 0 | Status: SHIPPED | OUTPATIENT
Start: 2022-10-27 | End: 2023-03-15

## 2022-10-27 NOTE — TELEPHONE ENCOUNTER
Routing refill request to provider for review/approval because:   Last blood pressure under 140/90 in past 12 months  BP Readings from Last 3 Encounters:   10/08/22 (!) 155/79   09/25/22 138/59   09/12/22 110/72     Last Written Prescription Date:  6/9/22  Last Fill Quantity: 90,  # refills: 0   Last office visit: 9/12/2022 with prescribing provider:  Curry Allen Office Visit:     ITALO Mckinney RN  [facility]

## 2022-11-02 DIAGNOSIS — F41.1 GAD (GENERALIZED ANXIETY DISORDER): ICD-10-CM

## 2022-11-02 RX ORDER — SERTRALINE HYDROCHLORIDE 100 MG/1
TABLET, FILM COATED ORAL
Qty: 90 TABLET | Refills: 0 | Status: SHIPPED | OUTPATIENT
Start: 2022-11-02 | End: 2023-03-15

## 2022-11-02 NOTE — TELEPHONE ENCOUNTER
Prescription approved per [facility] Refill Protocol.  ITALO Mckinney RN  [facility]

## 2022-11-15 ENCOUNTER — OFFICE VISIT (OUTPATIENT)
Dept: CARDIOLOGY | Facility: CLINIC | Age: 87
End: 2022-11-15
Attending: INTERNAL MEDICINE
Payer: MEDICARE

## 2022-11-15 VITALS
HEIGHT: 58 IN | SYSTOLIC BLOOD PRESSURE: 134 MMHG | WEIGHT: 174.4 LBS | BODY MASS INDEX: 36.61 KG/M2 | OXYGEN SATURATION: 96 % | DIASTOLIC BLOOD PRESSURE: 76 MMHG | HEART RATE: 70 BPM

## 2022-11-15 DIAGNOSIS — I48.20 CHRONIC ATRIAL FIBRILLATION (H): Primary | ICD-10-CM

## 2022-11-15 PROCEDURE — 99214 OFFICE O/P EST MOD 30 MIN: CPT | Performed by: INTERNAL MEDICINE

## 2022-11-15 PROCEDURE — 93005 ELECTROCARDIOGRAM TRACING: CPT

## 2022-11-15 PROCEDURE — G0463 HOSPITAL OUTPT CLINIC VISIT: HCPCS | Mod: 25

## 2022-11-15 ASSESSMENT — PAIN SCALES - GENERAL: PAINLEVEL: NO PAIN (0)

## 2022-11-15 NOTE — LETTER
11/15/2022      RE: Dimple Oviedo  [address]  [facility] [address]       Dear Colleague,    Thank you for the opportunity to participate in the care of your patient, Dimple Oviedo, at the [facility] [place] [facility]. Please see a copy of my visit note below.    HPI:   Mrs. Oviedo is a very pleasant 89-year-old woman with a past medical history including bladder malignancy, kidney removal, and hypertension, GERD and history of atrial fibrillation with rapid rates.  She is particularly troubled by chronic lymphedema and is followed in the lymphedema clinic.  She uses appropriate  stockings every day but nonetheless still feels as though her she is gaining weight through the lymphedema problem.    Dimple notes that she has been restricting her salt and sugar intake.  She complains that her meals did not taste acceptably with out some salt and we agreed that she should be permitted to use salt and to some extent sugar in a limited way to improve the palatability of her diet.     At today's visit Dimple is without any cardiac symptoms.  We did increase her diltiazem dose somewhat last year and her twelve-lead ECG today in atrial fibrillation has an average rate of 84 versus closer to 100 prior to the increase    Peripheral examination is difficult due to lymphedema.  It is hard also to examine her jugular veins.  Chest was clear today.  However she does have poor air entry bilaterally due to obstructive airway disease..     Her last echocardiogram was obtained 1 year ago and we will schedule again for her to be undertaken prior to her next visit in 1 year.      Overall Dimple seems to be doing relatively well but remains unhappy with regard to the lymphedema problem.      PAST MEDICAL HISTORY:  Past Medical History:   Diagnosis Date     CRESENCIO (acute kidney injury) (H) 9/11/2018     Arthritis      Asthma 2/9/2022     Calculus of  kidney      Chemotherapy-induced neutropenia (H) 3/6/2019     Congestive heart failure (H)      Esophageal reflux      GERD (gastroesophageal reflux disease)      Hyperlipidemia LDL goal <130 5/9/2010     Malignant melanoma of skin of trunk, except scrotum (H)      Nonspecific abnormal finding     has living will 2004 -      Nontoxic multinodular goiter     no further eval /tx rec per pt     Osteopenia      Other psoriasis      Personal history of colonic polyps      PMR (polymyalgia rheumatica) (H)      Restless legs syndrome 7/11/2018     Stress-induced cardiomyopathy      Undiagnosed cardiac murmurs      Unspecified constipation      Unspecified essential hypertension      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       CURRENT MEDICATIONS:  Current Outpatient Medications   Medication Sig Dispense Refill     alendronate (FOSAMAX) 70 MG tablet TAKE 1 TABLET EVERY 7 DAYS AT LEAST 60 MINUTES BEFORE BREAKFAST AS DIRECTED 12 tablet 2     diltiazem ER (DILT-XR) 180 MG 24 hr capsule Take 1 capsule (180 mg) by mouth daily 90 capsule 0     ferrous sulfate 325 (65 Fe) MG TBEC EC tablet Take 325 mg by mouth every morning        furosemide (LASIX) 20 MG tablet TAKE 1 TABLET TWICE DAILY IN THE MORNING AND AFTER LUNCH 180 tablet 1     irbesartan (AVAPRO) 300 MG tablet TAKE 1 TABLET EVERY DAY 90 tablet 0     levofloxacin (LEVAQUIN) 500 MG tablet Take one tablet six hours after treatment and one tablet morning after treatment 6 tablet 11     lovastatin (MEVACOR) 40 MG tablet Take 1 tablet (40 mg) by mouth At Bedtime 90 tablet 3     methimazole (TAPAZOLE) 5 MG tablet TAKE 1 TABLET ALTERNATING WITH 1/2 TABLET EVERY OTHER DAY 90 tablet 1     Omega-3 Fatty Acids (FISH OIL) 500 MG CAPS Take 1 capsule by mouth every morning        omeprazole (PRILOSEC) 20 MG DR capsule TAKE 1 CAPSULE EVERY DAY 90 capsule 1     propranolol ER (INDERAL LA) 60 MG 24 hr capsule TAKE 1 CAPSULE EVERY DAY 90 capsule 3     RESTASIS 0.05 % ophthalmic emulsion         rivaroxaban ANTICOAGULANT (XARELTO) 10 MG TABS tablet Take 1 tablet (10 mg) by mouth daily (with dinner) 90 tablet 1     rOPINIRole (REQUIP) 0.25 MG tablet TAKE 1 TABLET IN THE AFTERNOON AND TAKE 2 TABLETS EVERY NIGHT 270 tablet 1     sertraline (ZOLOFT) 100 MG tablet TAKE 1 TABLET EVERY MORNING 90 tablet 0     traZODone (DESYREL) 50 MG tablet TAKE 1/2 TABLET AT BEDTIME 45 tablet 3       PAST SURGICAL HISTORY:  Past Surgical History:   Procedure Laterality Date     ARTHROPLASTY KNEE Right 11/9/2020    Procedure: ARTHROPLASTY, KNEE, TOTAL, RIGHT;  Surgeon: Edward Otero MD;  [facility]: [facility]     BIOPSY       COLONOSCOPY  2014     COMBINED CYSTOSCOPY, INSERT STENT URETER(S) Bilateral 9/12/2018    Procedure: COMBINED CYSTOSCOPY, INSERT STENT URETER(S);  Cystoscopy Bilateral ureteral Stent Placement.;  Surgeon: Justin Henry MD;  [facility]     COMBINED CYSTOSCOPY, RETROGRADES, URETEROSCOPY, INSERT STENT Bilateral 4/3/2018    Procedure: COMBINED CYSTOSCOPY, RETROGRADES, URETEROSCOPY, INSERT STENT;;  Surgeon: Stuart King MD;  [facility]     COMBINED CYSTOSCOPY, RETROGRADES, URETEROSCOPY, INSERT STENT Bilateral 9/10/2018    Procedure: COMBINED CYSTOSCOPY, RETROGRADES, URETEROSCOPY, INSERT STENT;  Cystoscopy, Bilateral Ureteroscopy, Bladder Biopsies, Retrogram Pyelograms, Ureteral Washings and brushings, cysview;  Surgeon: Stuart King MD;  [facility]     COMBINED CYSTOSCOPY, RETROGRADES, URETEROSCOPY, INSERT STENT Left 8/26/2020    Procedure: Cystoscopy, Left Ureteral Wash, Left ureteral Retrograde Pyelogram;  Surgeon: Justin Henry MD;  [facility]     CYSTOSCOPY, BIOPSY BLADDER INSTILL OPTICAL AGENT N/A 4/3/2018    Procedure: CYSTOSCOPY, BIOPSY BLADDER INSTILL OPTICAL AGENT;  Cystoscopy, Blue Light Cystoscopy, Bladder Biopsies, Bilateral Selective ureteral washings for Cytology, Bilateral Retrograde Pyelograms, Bilateral Ureteroscopy;  Surgeon: Christine  Stuart Blackmon MD;  [facility]     CYSTOSCOPY, BIOPSY BLADDER, COMBINED N/A 2/19/2018    Procedure: COMBINED CYSTOSCOPY, BIOPSY BLADDER;  Cystoscopy, Bladder Biopsy;  Surgeon: Kenna La MD;  [facility]     CYSTOSCOPY, BIOPSY BLADDER, COMBINED N/A 8/26/2020    Procedure: Cystoscopy, biopsy bladder, combined;  Surgeon: Justin Henry MD;  [facility]     CYSTOSCOPY, FULGURATE BLADDER TUMOR, COMBINED N/A 1/13/2020    Procedure: CYSTOSCOPY, WITH  bladder biopsies and fulguration;  Surgeon: Stuart King MD;  [facility]     CYSTOSCOPY, REMOVE STENT(S), COMBINED  11/13/2018    Procedure: Flexible Cystoscopy with Stent Removal;  Surgeon: Stuart King MD;  [facility]     DAVINCI LYMPHADENECTOMY N/A 11/13/2018    Procedure: Davinci Lymphadenectomy ;  Surgeon: Stuart King MD;  [facility]     DAVINCI NEPHROURETERECTOMY N/A 11/13/2018    Procedure: Right DaVinci Assisted Nephroureterectomy;  Surgeon: Stuart King MD;  [facility]     ENDOSCOPIC ULTRASOUND LOWER GASTROINTESTIONAL TRACT (GI) N/A 10/30/2015    Procedure: ENDOSCOPIC ULTRASOUND LOWER GASTROINTESTIONAL TRACT (GI);  Surgeon: Daniel Jean-Baptiste MD;  [facility]     EYE SURGERY  12/4/17     INSERT PORT VASCULAR ACCESS Right 12/19/2018    Procedure: Chest Port Placement - right;  Surgeon: Stuart Chavez PA-C;  [facility]     IR CHEST PORT PLACEMENT > 5 YRS OF AGE  12/19/2018     IR PORT REMOVAL RIGHT  6/26/2019     LAPAROSCOPIC CHOLECYSTECTOMY WITH CHOLANGIOGRAMS N/A 11/1/2015    Procedure: LAPAROSCOPIC CHOLECYSTECTOMY WITH CHOLANGIOGRAMS;  Surgeon: Tonie Warren MD;  [facility]     REMOVE PORT VASCULAR ACCESS Right 6/26/2019    Procedure: Right Port Removal;  Surgeon: Froilan Irizarry PA-C;  [facility]     SURGICAL HISTORY OF -   1996    malignant melanoma     SURGICAL HISTORY OF -   1968    thyroid nodule      "SURGICAL HISTORY OF -       D & C     [facility] NONSPECIFIC PROCEDURE  2005    colonoscopy polyp repeat 2010       ALLERGIES:     Allergies   Allergen Reactions     Codeine        FAMILY HISTORY:  Family History   Problem Relation Age of Onset     Cancer Father         dec - esophageal and laryngeal     Heart Disease Mother      Respiratory Mother         dec     Breast Cancer Daughter      Other Cancer Daughter      Thyroid Disease Daughter      Asthma Daughter      Hyperlipidemia Son      Diabetes Son      Anesthesia Reaction No family hx of      Deep Vein Thrombosis (DVT) No family hx of        SOCIAL HISTORY:  Social History     Tobacco Use     Smoking status: Never     Smokeless tobacco: Never   Vaping Use     Vaping Use: Never used   Substance Use Topics     Alcohol use: No     Alcohol/week: 0.0 standard drinks     Comment: rare     Drug use: No       ROS:   Constitutional: No fever, chills, or sweats. Weight stable.   ENT: No visual disturbance, ear ache, epistaxis, sore throat.   Cardiovascular: As per HPI.   Respiratory: No cough, hemoptysis.    GI: No nausea, vomiting, hematemesis, melena, or hematochezia.   : No hematuria.   Integument: Negative.   Psychiatric: Negative.   Hematologic:  Easy bruising, no easy bleeding.  Neuro: Negative.   Endocrinology: No significant heat or cold intolerance   Musculoskeletal: No myalgia.    Exam:  /76 (BP Location: Right arm, Patient Position: Sitting, Cuff Size: Adult Regular)   Pulse 70   Ht 1.476 m (4' 10.11\")   Wt 79.1 kg (174 lb 6.4 oz)   SpO2 96%   BMI 36.31 kg/m    GENERAL APPEARANCE: healthy, alert and no distress  HEENT: no icterus, no xanthelasmas, normal pupil size and reaction, normal palate, mucosa moist, no central cyanosis  NECK: no adenopathy,  JVP not elevated but difficult to assess  RESPIRATORY: lungs clear to auscultation - no rales, rhonchi or wheezes, no use of accessory muscles, no retractions, respirations are unlabored, normal respiratory " rate  CARDIOVASCULAR: irregular rhythm, normal S1.  ABDOMEN: soft, non tender,  no abdominal bruits  EXTREMITIES: Lymphedema precludes adequate assessment of peripheral pulses  NEURO: alert and oriented to person/place/time, normal speech, gait and affect  SKIN: no ecchymoses, no rashes    Labs:  CBC RESULTS:   Lab Results   Component Value Date    WBC 10.6 10/08/2022    WBC 7.1 05/11/2021    RBC 3.87 10/08/2022    RBC 4.00 05/11/2021    HGB 12.0 10/08/2022    HGB 12.6 05/11/2021    HCT 37.3 10/08/2022    HCT 39.2 05/11/2021    MCV 96 10/08/2022    MCV 98 05/11/2021    MCH 31.0 10/08/2022    MCH 31.5 05/11/2021    MCHC 32.2 10/08/2022    MCHC 32.1 05/11/2021    RDW 14.2 10/08/2022    RDW 13.6 05/11/2021     10/08/2022     05/11/2021       BMP RESULTS:  Lab Results   Component Value Date     (L) 10/08/2022     05/11/2021    POTASSIUM 4.0 10/08/2022    POTASSIUM 4.3 09/12/2022    POTASSIUM 4.2 05/11/2021    CHLORIDE 96 (L) 10/08/2022    CHLORIDE 102 09/12/2022    CHLORIDE 101 05/11/2021    CO2 26 10/08/2022    CO2 27 09/12/2022    CO2 29 05/11/2021    ANIONGAP 11 10/08/2022    ANIONGAP 6 09/12/2022    ANIONGAP 8 05/11/2021     (H) 10/08/2022     (H) 09/12/2022     (H) 05/11/2021    BUN 19.0 10/08/2022    BUN 21 09/12/2022    BUN 24 05/11/2021    CR 0.91 10/08/2022    CR 1.04 05/11/2021    GFRESTIMATED 60 (L) 10/08/2022    GFRESTIMATED 40 (L) 11/19/2021    GFRESTIMATED 48 (L) 05/11/2021    GFRESTBLACK 56 (L) 05/11/2021    SHREYA 9.6 10/08/2022    SHREYA 9.3 05/11/2021        INR RESULTS:  Lab Results   Component Value Date    INR 1.90 (H) 05/31/2022    INR 1.13 04/21/2022    INR 2.04 (H) 12/09/2021    INR 0.95 07/20/2020    INR 0.99 06/26/2019    INR 0.97 02/07/2019    INR 0.91 12/19/2018       Procedures:  PULMONARY FUNCTION TESTS:   No flowsheet data found.      ECHOCARDIOGRAM:   No results found for this or any previous visit (from the past 8760 hour(s)).      Assessment and  Plan:  1.  Permanent atrial fibrillation with adequate rate control on current medications  2.  Chronic lower extremity lymphedema    Plan  1.  Continue current medications  2.  Echocardiogram prior to next year's visit  3.  Repeat clinic visit 1 year    Total elapsed time today with chart review, clinic visit and documentation 30 minutes    I very much appreciated the opportunity to see and assess Dimple Oviedo in the clinic today. Please do not hesitate to contact my office if you have any questions or concerns.        Please do not hesitate to contact me if you have any questions/concerns.     Sincerely,     Butch Griffith MD

## 2022-11-15 NOTE — NURSING NOTE
Chief Complaint   Patient presents with     Follow Up     Return EP          Vitals were taken, medications reconciled and EKG performed.     Edgar Zavala, EMT   1:41 PM

## 2022-11-15 NOTE — PROGRESS NOTES
HPI:   Mrs. Oviedo is a very pleasant 89-year-old woman with a past medical history including bladder malignancy, kidney removal, and hypertension, GERD and history of atrial fibrillation with rapid rates.  She is particularly troubled by chronic lymphedema and is followed in the lymphedema clinic.  She uses appropriate  stockings every day but nonetheless still feels as though her she is gaining weight through the lymphedema problem.    Dimple notes that she has been restricting her salt and sugar intake.  She complains that her meals did not taste acceptably with out some salt and we agreed that she should be permitted to use salt and to some extent sugar in a limited way to improve the palatability of her diet.     At today's visit Dimple is without any cardiac symptoms.  We did increase her diltiazem dose somewhat last year and her twelve-lead ECG today in atrial fibrillation has an average rate of 84 versus closer to 100 prior to the increase    Peripheral examination is difficult due to lymphedema.  It is hard also to examine her jugular veins.  Chest was clear today.  However she does have poor air entry bilaterally due to obstructive airway disease..     Her last echocardiogram was obtained 1 year ago and we will schedule again for her to be undertaken prior to her next visit in 1 year.      Overall Dimple seems to be doing relatively well but remains unhappy with regard to the lymphedema problem.      PAST MEDICAL HISTORY:  Past Medical History:   Diagnosis Date     CRESENCIO (acute kidney injury) (H) 9/11/2018     Arthritis      Asthma 2/9/2022     Calculus of kidney      Chemotherapy-induced neutropenia (H) 3/6/2019     Congestive heart failure (H)      Esophageal reflux      GERD (gastroesophageal reflux disease)      Hyperlipidemia LDL goal <130 5/9/2010     Malignant melanoma of skin of trunk, except scrotum (H)      Nonspecific abnormal finding     has living will 2004 -      Nontoxic multinodular goiter      no further eval /tx rec per pt     Osteopenia      Other psoriasis      Personal history of colonic polyps      PMR (polymyalgia rheumatica) (H)      Restless legs syndrome 7/11/2018     Stress-induced cardiomyopathy      Undiagnosed cardiac murmurs      Unspecified constipation      Unspecified essential hypertension      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       CURRENT MEDICATIONS:  Current Outpatient Medications   Medication Sig Dispense Refill     alendronate (FOSAMAX) 70 MG tablet TAKE 1 TABLET EVERY 7 DAYS AT LEAST 60 MINUTES BEFORE BREAKFAST AS DIRECTED 12 tablet 2     diltiazem ER (DILT-XR) 180 MG 24 hr capsule Take 1 capsule (180 mg) by mouth daily 90 capsule 0     ferrous sulfate 325 (65 Fe) MG TBEC EC tablet Take 325 mg by mouth every morning        furosemide (LASIX) 20 MG tablet TAKE 1 TABLET TWICE DAILY IN THE MORNING AND AFTER LUNCH 180 tablet 1     irbesartan (AVAPRO) 300 MG tablet TAKE 1 TABLET EVERY DAY 90 tablet 0     levofloxacin (LEVAQUIN) 500 MG tablet Take one tablet six hours after treatment and one tablet morning after treatment 6 tablet 11     lovastatin (MEVACOR) 40 MG tablet Take 1 tablet (40 mg) by mouth At Bedtime 90 tablet 3     methimazole (TAPAZOLE) 5 MG tablet TAKE 1 TABLET ALTERNATING WITH 1/2 TABLET EVERY OTHER DAY 90 tablet 1     Omega-3 Fatty Acids (FISH OIL) 500 MG CAPS Take 1 capsule by mouth every morning        omeprazole (PRILOSEC) 20 MG DR capsule TAKE 1 CAPSULE EVERY DAY 90 capsule 1     propranolol ER (INDERAL LA) 60 MG 24 hr capsule TAKE 1 CAPSULE EVERY DAY 90 capsule 3     RESTASIS 0.05 % ophthalmic emulsion        rivaroxaban ANTICOAGULANT (XARELTO) 10 MG TABS tablet Take 1 tablet (10 mg) by mouth daily (with dinner) 90 tablet 1     rOPINIRole (REQUIP) 0.25 MG tablet TAKE 1 TABLET IN THE AFTERNOON AND TAKE 2 TABLETS EVERY NIGHT 270 tablet 1     sertraline (ZOLOFT) 100 MG tablet TAKE 1 TABLET EVERY MORNING 90 tablet 0     traZODone (DESYREL) 50 MG tablet TAKE 1/2  TABLET AT BEDTIME 45 tablet 3       PAST SURGICAL HISTORY:  Past Surgical History:   Procedure Laterality Date     ARTHROPLASTY KNEE Right 11/9/2020    Procedure: ARTHROPLASTY, KNEE, TOTAL, RIGHT;  Surgeon: Edward Otero MD;  [facility]     BIOPSY       COLONOSCOPY  2014     COMBINED CYSTOSCOPY, INSERT STENT URETER(S) Bilateral 9/12/2018    Procedure: COMBINED CYSTOSCOPY, INSERT STENT URETER(S);  Cystoscopy Bilateral ureteral Stent Placement.;  Surgeon: Justin Henry MD;  [facility]     COMBINED CYSTOSCOPY, RETROGRADES, URETEROSCOPY, INSERT STENT Bilateral 4/3/2018    Procedure: COMBINED CYSTOSCOPY, RETROGRADES, URETEROSCOPY, INSERT STENT;;  Surgeon: Stuart King MD;  [facility]     COMBINED CYSTOSCOPY, RETROGRADES, URETEROSCOPY, INSERT STENT Bilateral 9/10/2018    Procedure: COMBINED CYSTOSCOPY, RETROGRADES, URETEROSCOPY, INSERT STENT;  Cystoscopy, Bilateral Ureteroscopy, Bladder Biopsies, Retrogram Pyelograms, Ureteral Washings and brushings, cysview;  Surgeon: Stuart King MD;  [facility]     COMBINED CYSTOSCOPY, RETROGRADES, URETEROSCOPY, INSERT STENT Left 8/26/2020    Procedure: Cystoscopy, Left Ureteral Wash, Left ureteral Retrograde Pyelogram;  Surgeon: Justin Henry MD;  [facility]     CYSTOSCOPY, BIOPSY BLADDER INSTILL OPTICAL AGENT N/A 4/3/2018    Procedure: CYSTOSCOPY, BIOPSY BLADDER INSTILL OPTICAL AGENT;  Cystoscopy, Blue Light Cystoscopy, Bladder Biopsies, Bilateral Selective ureteral washings for Cytology, Bilateral Retrograde Pyelograms, Bilateral Ureteroscopy;  Surgeon: Stuart King MD;  [facility]     CYSTOSCOPY, BIOPSY BLADDER, COMBINED N/A 2/19/2018    Procedure: COMBINED CYSTOSCOPY, BIOPSY BLADDER;  Cystoscopy, Bladder Biopsy;  Surgeon: Kenna La MD;  [facility]     CYSTOSCOPY, BIOPSY BLADDER, COMBINED N/A 8/26/2020    Procedure: Cystoscopy, biopsy bladder, combined;  Surgeon: Carl  Justin Mcgovern MD;  [facility]     CYSTOSCOPY, FULGURATE BLADDER TUMOR, COMBINED N/A 1/13/2020    Procedure: CYSTOSCOPY, WITH  bladder biopsies and fulguration;  Surgeon: Stuart King MD;  [facility]     CYSTOSCOPY, REMOVE STENT(S), COMBINED  11/13/2018    Procedure: Flexible Cystoscopy with Stent Removal;  Surgeon: Stuart King MD;  [facility]     DAVINCI LYMPHADENECTOMY N/A 11/13/2018    Procedure: Davinci Lymphadenectomy ;  Surgeon: Stuart King MD;  [facility]     DAVINCI NEPHROURETERECTOMY N/A 11/13/2018    Procedure: Right DaVinci Assisted Nephroureterectomy;  Surgeon: Stuart King MD;  [facility]     ENDOSCOPIC ULTRASOUND LOWER GASTROINTESTIONAL TRACT (GI) N/A 10/30/2015    Procedure: ENDOSCOPIC ULTRASOUND LOWER GASTROINTESTIONAL TRACT (GI);  Surgeon: Daniel Jean-Baptiste MD;  [facility]     EYE SURGERY  12/4/17     INSERT PORT VASCULAR ACCESS Right 12/19/2018    Procedure: Chest Port Placement - right;  Surgeon: Stuart Chavez PA-C;  [facility]     IR CHEST PORT PLACEMENT > 5 YRS OF AGE  12/19/2018     IR PORT REMOVAL RIGHT  6/26/2019     LAPAROSCOPIC CHOLECYSTECTOMY WITH CHOLANGIOGRAMS N/A 11/1/2015    Procedure: LAPAROSCOPIC CHOLECYSTECTOMY WITH CHOLANGIOGRAMS;  Surgeon: Tonie Warren MD;  [facility]     REMOVE PORT VASCULAR ACCESS Right 6/26/2019    Procedure: Right Port Removal;  Surgeon: Froilan Irizarry PA-C;  [facility]     SURGICAL HISTORY OF -   1996    malignant melanoma     SURGICAL HISTORY OF -   1968    thyroid nodule     SURGICAL HISTORY OF -       [facility]     [facility] NONSPECIFIC PROCEDURE  2005    colonoscopy polyp repeat 2010       ALLERGIES:     Allergies   Allergen Reactions     Codeine        FAMILY HISTORY:  Family History   Problem Relation Age of Onset     Cancer Father         dec - esophageal and laryngeal     Heart Disease Mother      Respiratory Mother         dec      "Breast Cancer Daughter      Other Cancer Daughter      Thyroid Disease Daughter      Asthma Daughter      Hyperlipidemia Son      Diabetes Son      Anesthesia Reaction No family hx of      Deep Vein Thrombosis (DVT) No family hx of        SOCIAL HISTORY:  Social History     Tobacco Use     Smoking status: Never     Smokeless tobacco: Never   Vaping Use     Vaping Use: Never used   Substance Use Topics     Alcohol use: No     Alcohol/week: 0.0 standard drinks     Comment: rare     Drug use: No       ROS:   Constitutional: No fever, chills, or sweats. Weight stable.   ENT: No visual disturbance, ear ache, epistaxis, sore throat.   Cardiovascular: As per HPI.   Respiratory: No cough, hemoptysis.    GI: No nausea, vomiting, hematemesis, melena, or hematochezia.   : No hematuria.   Integument: Negative.   Psychiatric: Negative.   Hematologic:  Easy bruising, no easy bleeding.  Neuro: Negative.   Endocrinology: No significant heat or cold intolerance   Musculoskeletal: No myalgia.    Exam:  /76 (BP Location: Right arm, Patient Position: Sitting, Cuff Size: Adult Regular)   Pulse 70   Ht 1.476 m (4' 10.11\")   Wt 79.1 kg (174 lb 6.4 oz)   SpO2 96%   BMI 36.31 kg/m    GENERAL APPEARANCE: healthy, alert and no distress  HEENT: no icterus, no xanthelasmas, normal pupil size and reaction, normal palate, mucosa moist, no central cyanosis  NECK: no adenopathy,  JVP not elevated but difficult to assess  RESPIRATORY: lungs clear to auscultation - no rales, rhonchi or wheezes, no use of accessory muscles, no retractions, respirations are unlabored, normal respiratory rate  CARDIOVASCULAR: irregular rhythm, normal S1.  ABDOMEN: soft, non tender,  no abdominal bruits  EXTREMITIES: Lymphedema precludes adequate assessment of peripheral pulses  NEURO: alert and oriented to person/place/time, normal speech, gait and affect  SKIN: no ecchymoses, no rashes    Labs:  CBC RESULTS:   Lab Results   Component Value Date    WBC 10.6 " 10/08/2022    WBC 7.1 05/11/2021    RBC 3.87 10/08/2022    RBC 4.00 05/11/2021    HGB 12.0 10/08/2022    HGB 12.6 05/11/2021    HCT 37.3 10/08/2022    HCT 39.2 05/11/2021    MCV 96 10/08/2022    MCV 98 05/11/2021    MCH 31.0 10/08/2022    MCH 31.5 05/11/2021    MCHC 32.2 10/08/2022    MCHC 32.1 05/11/2021    RDW 14.2 10/08/2022    RDW 13.6 05/11/2021     10/08/2022     05/11/2021       BMP RESULTS:  Lab Results   Component Value Date     (L) 10/08/2022     05/11/2021    POTASSIUM 4.0 10/08/2022    POTASSIUM 4.3 09/12/2022    POTASSIUM 4.2 05/11/2021    CHLORIDE 96 (L) 10/08/2022    CHLORIDE 102 09/12/2022    CHLORIDE 101 05/11/2021    CO2 26 10/08/2022    CO2 27 09/12/2022    CO2 29 05/11/2021    ANIONGAP 11 10/08/2022    ANIONGAP 6 09/12/2022    ANIONGAP 8 05/11/2021     (H) 10/08/2022     (H) 09/12/2022     (H) 05/11/2021    BUN 19.0 10/08/2022    BUN 21 09/12/2022    BUN 24 05/11/2021    CR 0.91 10/08/2022    CR 1.04 05/11/2021    GFRESTIMATED 60 (L) 10/08/2022    GFRESTIMATED 40 (L) 11/19/2021    GFRESTIMATED 48 (L) 05/11/2021    GFRESTBLACK 56 (L) 05/11/2021    SHREYA 9.6 10/08/2022    SHREYA 9.3 05/11/2021        INR RESULTS:  Lab Results   Component Value Date    INR 1.90 (H) 05/31/2022    INR 1.13 04/21/2022    INR 2.04 (H) 12/09/2021    INR 0.95 07/20/2020    INR 0.99 06/26/2019    INR 0.97 02/07/2019    INR 0.91 12/19/2018       Procedures:  PULMONARY FUNCTION TESTS:   No flowsheet data found.      ECHOCARDIOGRAM:   No results found for this or any previous visit (from the past 8760 hour(s)).      Assessment and Plan:  1.  Permanent atrial fibrillation with adequate rate control on current medications  2.  Chronic lower extremity lymphedema    Plan  1.  Continue current medications  2.  Echocardiogram prior to next year's visit  3.  Repeat clinic visit 1 year    Total elapsed time today with chart review, clinic visit and documentation 30 minutes    I very much  appreciated the opportunity to see and assess Dimple Oviedo in the clinic today. Please do not hesitate to contact my office if you have any questions or concerns.      Butch Griffith MD  Cardiac Arrhythmia Service  [facility]  [phone]      CC  BUTCH GRIFFITH

## 2022-11-15 NOTE — PATIENT INSTRUCTIONS
You were seen in the Electrophysiology Clinic today by: Dr Griffith    Plan:     Follow up Visit:  1 year with echocardiogram prior      If you have further questions, please utilize [employer] to contact us.     Your Care Team:    [facility] Cardiology   Telephone Number     Nurse Line  Lianna Carlisle, RN   Gladys Joel, RN   [phone]     For scheduling appointments:   All   [phone]   For procedure scheduling:    Lenora Ramos     [phone]   For the Device Clinic (Pacemakers, ICDs, Loop Recorders)    During business hours: [phone]  After business hours:   [phone]- select option 4 and ask for job code [id number].       On-call cardiologist for after hours or on weekends:   [phone], option #4, and ask to speak to the on-call cardiologist.     Cardiovascular Clinic:   [address] [place] [address]. [place] [address]      As always, Thank you for trusting us with your health care needs!

## 2022-11-16 LAB
ATRIAL RATE - MUSE: 312 BPM
DIASTOLIC BLOOD PRESSURE - MUSE: NORMAL MMHG
INTERPRETATION ECG - MUSE: NORMAL
P AXIS - MUSE: NORMAL DEGREES
PR INTERVAL - MUSE: NORMAL MS
QRS DURATION - MUSE: 102 MS
QT - MUSE: 374 MS
QTC - MUSE: 441 MS
R AXIS - MUSE: -48 DEGREES
SYSTOLIC BLOOD PRESSURE - MUSE: NORMAL MMHG
T AXIS - MUSE: 72 DEGREES
VENTRICULAR RATE- MUSE: 84 BPM

## 2022-12-07 DIAGNOSIS — E78.5 HYPERLIPIDEMIA LDL GOAL <130: ICD-10-CM

## 2022-12-09 ENCOUNTER — ANCILLARY PROCEDURE (OUTPATIENT)
Dept: CT IMAGING | Facility: CLINIC | Age: 87
End: 2022-12-09
Attending: INTERNAL MEDICINE
Payer: MEDICARE

## 2022-12-09 ENCOUNTER — LAB (OUTPATIENT)
Dept: LAB | Facility: CLINIC | Age: 87
End: 2022-12-09
Payer: MEDICARE

## 2022-12-09 DIAGNOSIS — C66.1 UROTHELIAL CARCINOMA OF RIGHT DISTAL URETER (H): ICD-10-CM

## 2022-12-09 DIAGNOSIS — E05.00 GRAVES DISEASE: ICD-10-CM

## 2022-12-09 DIAGNOSIS — Z13.220 SCREENING FOR HYPERLIPIDEMIA: ICD-10-CM

## 2022-12-09 DIAGNOSIS — E78.5 HYPERLIPIDEMIA LDL GOAL <130: ICD-10-CM

## 2022-12-09 DIAGNOSIS — N18.30 CHRONIC KIDNEY DISEASE, STAGE 3 (H): ICD-10-CM

## 2022-12-09 LAB
ALBUMIN SERPL BCG-MCNC: 4.2 G/DL (ref 3.5–5.2)
ALP SERPL-CCNC: 91 U/L (ref 35–104)
ALT SERPL W P-5'-P-CCNC: 19 U/L (ref 10–35)
ANION GAP SERPL CALCULATED.3IONS-SCNC: 10 MMOL/L (ref 7–15)
AST SERPL W P-5'-P-CCNC: 27 U/L (ref 10–35)
BASOPHILS # BLD AUTO: 0.1 10E3/UL (ref 0–0.2)
BASOPHILS NFR BLD AUTO: 1 %
BILIRUB SERPL-MCNC: 0.5 MG/DL
BUN SERPL-MCNC: 18.1 MG/DL (ref 8–23)
CALCIUM SERPL-MCNC: 9.4 MG/DL (ref 8.8–10.2)
CHLORIDE SERPL-SCNC: 101 MMOL/L (ref 98–107)
CHOLEST SERPL-MCNC: 153 MG/DL
CREAT BLD-MCNC: 1 MG/DL (ref 0.5–1)
CREAT SERPL-MCNC: 1.05 MG/DL (ref 0.51–0.95)
CREAT UR-MCNC: 25 MG/DL
DEPRECATED HCO3 PLAS-SCNC: 28 MMOL/L (ref 22–29)
EOSINOPHIL # BLD AUTO: 0.1 10E3/UL (ref 0–0.7)
EOSINOPHIL NFR BLD AUTO: 1 %
ERYTHROCYTE [DISTWIDTH] IN BLOOD BY AUTOMATED COUNT: 13.4 % (ref 10–15)
GFR SERPL CREATININE-BSD FRML MDRD: 51 ML/MIN/1.73M2
GFR SERPL CREATININE-BSD FRML MDRD: 54 ML/MIN/1.73M2
GLUCOSE SERPL-MCNC: 132 MG/DL (ref 70–99)
HCT VFR BLD AUTO: 41.8 % (ref 35–47)
HDLC SERPL-MCNC: 47 MG/DL
HGB BLD-MCNC: 13.3 G/DL (ref 11.7–15.7)
IMM GRANULOCYTES # BLD: 0 10E3/UL
IMM GRANULOCYTES NFR BLD: 0 %
LDLC SERPL CALC-MCNC: 80 MG/DL
LYMPHOCYTES # BLD AUTO: 1.1 10E3/UL (ref 0.8–5.3)
LYMPHOCYTES NFR BLD AUTO: 14 %
MCH RBC QN AUTO: 30.9 PG (ref 26.5–33)
MCHC RBC AUTO-ENTMCNC: 31.8 G/DL (ref 31.5–36.5)
MCV RBC AUTO: 97 FL (ref 78–100)
MICROALBUMIN UR-MCNC: <12 MG/L
MICROALBUMIN/CREAT UR: NORMAL MG/G{CREAT}
MONOCYTES # BLD AUTO: 0.5 10E3/UL (ref 0–1.3)
MONOCYTES NFR BLD AUTO: 6 %
NEUTROPHILS # BLD AUTO: 5.9 10E3/UL (ref 1.6–8.3)
NEUTROPHILS NFR BLD AUTO: 78 %
NONHDLC SERPL-MCNC: 106 MG/DL
NRBC # BLD AUTO: 0 10E3/UL
NRBC BLD AUTO-RTO: 0 /100
PATH REPORT.COMMENTS IMP SPEC: NORMAL
PATH REPORT.FINAL DX SPEC: NORMAL
PATH REPORT.GROSS SPEC: NORMAL
PATH REPORT.MICROSCOPIC SPEC OTHER STN: NORMAL
PATH REPORT.RELEVANT HX SPEC: NORMAL
PLATELET # BLD AUTO: 240 10E3/UL (ref 150–450)
POTASSIUM SERPL-SCNC: 4.4 MMOL/L (ref 3.4–5.3)
PROT SERPL-MCNC: 7.2 G/DL (ref 6.4–8.3)
RBC # BLD AUTO: 4.3 10E6/UL (ref 3.8–5.2)
SODIUM SERPL-SCNC: 139 MMOL/L (ref 136–145)
TRIGL SERPL-MCNC: 130 MG/DL
TSH SERPL DL<=0.005 MIU/L-ACNC: 2.62 UIU/ML (ref 0.3–4.2)
WBC # BLD AUTO: 7.7 10E3/UL (ref 4–11)

## 2022-12-09 PROCEDURE — 71260 CT THORAX DX C+: CPT | Mod: MG | Performed by: RADIOLOGY

## 2022-12-09 PROCEDURE — 36415 COLL VENOUS BLD VENIPUNCTURE: CPT | Performed by: PATHOLOGY

## 2022-12-09 PROCEDURE — 88112 CYTOPATH CELL ENHANCE TECH: CPT | Mod: TC | Performed by: INTERNAL MEDICINE

## 2022-12-09 PROCEDURE — 82043 UR ALBUMIN QUANTITATIVE: CPT | Performed by: FAMILY MEDICINE

## 2022-12-09 PROCEDURE — 80050 GENERAL HEALTH PANEL: CPT | Performed by: PATHOLOGY

## 2022-12-09 PROCEDURE — 88120 CYTP URNE 3-5 PROBES EA SPEC: CPT | Mod: 26 | Performed by: PATHOLOGY

## 2022-12-09 PROCEDURE — 74178 CT ABD&PLV WO CNTR FLWD CNTR: CPT | Mod: MG | Performed by: RADIOLOGY

## 2022-12-09 PROCEDURE — 88112 CYTOPATH CELL ENHANCE TECH: CPT | Mod: 26 | Performed by: PATHOLOGY

## 2022-12-09 PROCEDURE — 80061 LIPID PANEL: CPT | Performed by: PATHOLOGY

## 2022-12-09 PROCEDURE — G1010 CDSM STANSON: HCPCS | Performed by: RADIOLOGY

## 2022-12-09 PROCEDURE — 88120 CYTP URNE 3-5 PROBES EA SPEC: CPT | Mod: TC | Performed by: INTERNAL MEDICINE

## 2022-12-09 RX ORDER — IOPAMIDOL 755 MG/ML
97 INJECTION, SOLUTION INTRAVASCULAR ONCE
Status: COMPLETED | OUTPATIENT
Start: 2022-12-09 | End: 2022-12-09

## 2022-12-09 RX ORDER — LOVASTATIN 40 MG
TABLET ORAL
Qty: 90 TABLET | Refills: 0 | Status: SHIPPED | OUTPATIENT
Start: 2022-12-09 | End: 2023-03-02

## 2022-12-09 RX ADMIN — IOPAMIDOL 97 ML: 755 INJECTION, SOLUTION INTRAVASCULAR at 10:34

## 2022-12-09 NOTE — TELEPHONE ENCOUNTER
Medication is being filled for 1 time refill only due to:  Patient needs labs DUE. Patient needs to be seen because due for annual visit.    Last Written Prescription Date:  12/20/2021  Last Fill Quantity: 90,  # refills: 3   Last office visit: 9/12/2022 with prescribing provider:  Curry Allen Office Visit:   Next 5 appointments (look out 90 days)    Dec 13, 2022 12:30 PM  (Arrive by 12:15 PM)  Return Visit with Sd Fine MD  [facility] ([facility] ) [address] [place] [id number] [phone]         Scheduling:   Please contact patient to schedule an annual preventative. Thank you. Medicare Annual can be virtual.     SHASHANK BlancoN RN  [facility]

## 2022-12-13 ENCOUNTER — ONCOLOGY VISIT (OUTPATIENT)
Dept: ONCOLOGY | Facility: CLINIC | Age: 87
End: 2022-12-13
Attending: INTERNAL MEDICINE
Payer: MEDICARE

## 2022-12-13 VITALS
BODY MASS INDEX: 36.06 KG/M2 | HEART RATE: 82 BPM | RESPIRATION RATE: 16 BRPM | WEIGHT: 173.2 LBS | TEMPERATURE: 98.9 F | DIASTOLIC BLOOD PRESSURE: 75 MMHG | SYSTOLIC BLOOD PRESSURE: 146 MMHG | OXYGEN SATURATION: 96 %

## 2022-12-13 DIAGNOSIS — E05.00 GRAVES DISEASE: ICD-10-CM

## 2022-12-13 DIAGNOSIS — C68.9 UROTHELIAL CANCER (H): Primary | ICD-10-CM

## 2022-12-13 PROCEDURE — 99214 OFFICE O/P EST MOD 30 MIN: CPT | Performed by: INTERNAL MEDICINE

## 2022-12-13 PROCEDURE — G0463 HOSPITAL OUTPT CLINIC VISIT: HCPCS | Performed by: INTERNAL MEDICINE

## 2022-12-13 PROCEDURE — G0463 HOSPITAL OUTPT CLINIC VISIT: HCPCS

## 2022-12-13 ASSESSMENT — PAIN SCALES - GENERAL: PAINLEVEL: NO PAIN (0)

## 2022-12-13 NOTE — PROGRESS NOTES
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    HISTORY OF PRESENT ILLNESS:  Ms. 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 Her oncologic history is as under.    Ms. Oviedo is doing well. She denies any complains suggestive of recurrent disease.     She has marked bilateral pedal edema which is her primary complain. She has this since prior to her nephroureterectomy.      ONCOLOGIC HISTORY:  1. 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   - Jan 2018 to September 2018- Persistent gross hematuria and underwent multiple procedures.    - 2/19/18 and 4/3/18- cystoscopies with bladder biopsies  which were negative for bladder tumor  - 1/17/18, 3/8/18, 7/26/18, 9/10/18- Multiple urine cytologies have come back positive by FISH for high-grade transitional cell carcinoma  - 9/10/18- Underwent a bilateral cystoureteroscopy with biopsy and brushing that showed  right upper tract cytology suspicious for high-grade urothelial ca. Right ureter brushing negative from that day. Left upper tract negative.  - 9/10/18- CT A/P without contrast showed new small bilateral pleural effusions and interstitial pulmonary edema but no evidence of locoregional or metastatic disease.  - 9/12/18- Presented to ED with oliguria, CRESENCIO, and mild hydronephrosis bilaterally on CT scan and underwent bilateral ureteral stent placment  - 11/13/2018- Right robotic nephroureterectomy, excision of sandip-caval mass and LN excision by Stuart King. Pathology showed \"Right nephroureterectomy: Invasive high grade urothelial carcinoma measuring 3.5 cm, located in renal pelvis " "and invading through renal parenchyma into perinephric fat. Urothelial carcinoma in-situ present. Margins free of tumor. Ana-caval mass: Metastatic urothelial carcinoma involving one lymph node with at least 1 cm tumor deposit. Pericaval Extranodal Extension present. Five additional benign pericaval lymph nodes. Pathologic stage: pT4N1 (1 of 6 lymph nodes).\"  - 12/11/18- PET/CT whole body demonstrated significant residual FDG avid disease. For example, \"there is an FDG avid ill-defined pericaval mass immediately medial to the surgical clips measuring approximately 2.0 x 1.4 cm, with max SUV measuring up to12.2, see series 4 image 21. Additional FDG avid retrocaval and interaortocaval lymphadenopathy are noted.There is a 1.2 cm nodule in the right hemipelvis posterior lateral tothe bladder with max SUV measuring 8.3, see series 4 image 77. The bladder is incompletely distended.\" No suspicious thoracic or bone uptake.  - 12/12/18- Started adjuvant chemotherapy (carbo+gem) per POUT trial. Cycle 2 - 1/2/19. Cycle 3 - 1/23/19. Cycle 4 - 02/13/19.  - 1/22/19 - CT C/A/P with contrast compared with prior PET/CT: \"1. New well-circumscribed soft tissue pulmonary nodules of the right lower lobe and left upper lobe. Findings could be infectious, inflammatory, or represent metastatic disease. Continued attention on follow-up recommended. Postoperative changes of right nephrectomy. No evidence for local recurrence in the present study.\"  - 3/1/2019- CT C/A/P with contrast - \"1. Bilateral pulmonary nodules measuring up to 4 mm in the right lower lobe, previously measuring 8 mm. No new or enlarging pulmonary nodules. 2. No convincing evidence for metastatic disease in the abdomen, pelvis and bones.\"  - 6/3/2019- CT C/A/P with contrast - \"1. Surgical changes of right nephrectomy without evidence of residual or recurrent disease on this noncontrast exam.  Consider contrast enhanced examination on follow-up. 2. No evidence of " "metastatic disease in the abdomen, or pelvis on noncontrast CT.  Scattered tiny pulmonary nodules in the chest are not significantly changed.  Previously described prominent right lower lobe pulmonary nodule (previously measuring up to 8 mm) is no longer visualized. 3. Stable bilateral pulmonary nodules measuring maximally 4 mm.\"  - 09/11/19: CT C/A/P without contrast - \"1. Stable exam without evidence convincing for new disease. 2. Stable pulmonary nodules. 3. 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 4. Stable heterogeneous enlargement of the thyroid with underlying nodules suggested.\"  - 12/4/19: CT C/A/P without contrast - \"No acute change since prior exam. No evidence of recurrent or metastatic disease. Tiny lung nodules are stable. Prior right nephrectomy. Left renal artery aneurysm is stable.\"  - 04/08/20, 7/27/20: CT C/A/P with contrast - [facility].  - 01/12/21: CT C/A/P with contrast - \"1.  Slight increased size of a 6 mm left upper lobe nodule and new 4 mm linear opacity along the right major fissure. These are indeterminate but could represent progression of metastatic disease. 2.  Slight increased size of mildly enlarged mediastinal and hilar lymph nodes. 3.  Mild pulmonary edema. 4.  No recurrent or metastatic disease in the abdomen and pelvis. 5.  Mild stranding around the urinary bladder dome may be related to cystitis. Punctate focus of gas within the urinary bladder either secondary to infection or instrumentation. 6.  Enlarged multinodular thyroid gland.\"    2. Non-muscle invasive bladder cancer:  - 10/3/19: Cystoscopy showed a small area of erythema on retroflexion, possibly pre-existing or due to retroflexion of the scope. Urine cytology from that time showed cells suspicious for malignancy.   - 1/13/20: Bladder biopsy showed high grade urothelial carcinoma in-situ  - 2/12/20-3/18/20: Intravesical BCG therapy  - 7/24/20 and last cystoscopy was on the same day. It was negative. However, " urine cytology was positive for high-grade urothelial carcinoma.   - 11/25/20-12/10/2020: 3 weekly doses of intravesical BCG 50mg.   - 12/10/20: Cytology suspicious and FISH urovysion positive for TCC.    REVIEW OF SYSTEMS: 14 point ROS negative other than the symptoms noted above in the HPI.    PAST MEDICAL AND SURGICAL HISTORY:   Past Medical History:   Diagnosis Date     CRESENCIO (acute kidney injury) (H) 9/11/2018     Arthritis      Asthma 2/9/2022     Calculus of kidney      Chemotherapy-induced neutropenia (H) 3/6/2019     Congestive heart failure (H)      Esophageal reflux      GERD (gastroesophageal reflux disease)      Hyperlipidemia LDL goal <130 5/9/2010     Malignant melanoma of skin of trunk, except scrotum (H)      Nonspecific abnormal finding     has living will 2004 -      Nontoxic multinodular goiter     no further eval /tx rec per pt     Osteopenia      Other psoriasis      Personal history of colonic polyps      PMR (polymyalgia rheumatica) (H)      Restless legs syndrome 7/11/2018     Stress-induced cardiomyopathy      Undiagnosed cardiac murmurs      Unspecified constipation      Unspecified essential hypertension      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     Past Surgical History:   Procedure Laterality Date     ARTHROPLASTY KNEE Right 11/9/2020    Procedure: ARTHROPLASTY, KNEE, TOTAL, RIGHT;  Surgeon: Edward Otero MD;  [facility]     BIOPSY       COLONOSCOPY  2014     COMBINED CYSTOSCOPY, INSERT STENT URETER(S) Bilateral 9/12/2018    Procedure: COMBINED CYSTOSCOPY, INSERT STENT URETER(S);  Cystoscopy Bilateral ureteral Stent Placement.;  Surgeon: Justin Henry MD;  [facility]     COMBINED CYSTOSCOPY, RETROGRADES, URETEROSCOPY, INSERT STENT Bilateral 4/3/2018    Procedure: COMBINED CYSTOSCOPY, RETROGRADES, URETEROSCOPY, INSERT STENT;;  Surgeon: Stuart King MD;  [facility]     COMBINED CYSTOSCOPY, RETROGRADES, URETEROSCOPY, INSERT STENT Bilateral 9/10/2018     Procedure: COMBINED CYSTOSCOPY, RETROGRADES, URETEROSCOPY, INSERT STENT;  Cystoscopy, Bilateral Ureteroscopy, Bladder Biopsies, Retrogram Pyelograms, Ureteral Washings and brushings, cysview;  Surgeon: Stuart King MD;  [facility]     COMBINED CYSTOSCOPY, RETROGRADES, URETEROSCOPY, INSERT STENT Left 8/26/2020    Procedure: Cystoscopy, Left Ureteral Wash, Left ureteral Retrograde Pyelogram;  Surgeon: Justin Henry MD;  [facility]     CYSTOSCOPY, BIOPSY BLADDER INSTILL OPTICAL AGENT N/A 4/3/2018    Procedure: CYSTOSCOPY, BIOPSY BLADDER INSTILL OPTICAL AGENT;  Cystoscopy, Blue Light Cystoscopy, Bladder Biopsies, Bilateral Selective ureteral washings for Cytology, Bilateral Retrograde Pyelograms, Bilateral Ureteroscopy;  Surgeon: Stuart King MD;  [facility]     CYSTOSCOPY, BIOPSY BLADDER, COMBINED N/A 2/19/2018    Procedure: COMBINED CYSTOSCOPY, BIOPSY BLADDER;  Cystoscopy, Bladder Biopsy;  Surgeon: Kenna La MD;  [facility]     CYSTOSCOPY, BIOPSY BLADDER, COMBINED N/A 8/26/2020    Procedure: Cystoscopy, biopsy bladder, combined;  Surgeon: Justin Henry MD;  [facility]     CYSTOSCOPY, FULGURATE BLADDER TUMOR, COMBINED N/A 1/13/2020    Procedure: CYSTOSCOPY, WITH  bladder biopsies and fulguration;  Surgeon: Stuart King MD;  [facility]     CYSTOSCOPY, REMOVE STENT(S), COMBINED  11/13/2018    Procedure: Flexible Cystoscopy with Stent Removal;  Surgeon: Stuart King MD;  [facility]     DAVINCI LYMPHADENECTOMY N/A 11/13/2018    Procedure: Davinci Lymphadenectomy ;  Surgeon: Stuart King MD;  [facility]     DAVINCI NEPHROURETERECTOMY N/A 11/13/2018    Procedure: Right DaVinci Assisted Nephroureterectomy;  Surgeon: Stuart King MD;  [facility]     ENDOSCOPIC ULTRASOUND LOWER GASTROINTESTIONAL TRACT (GI) N/A 10/30/2015    Procedure: ENDOSCOPIC ULTRASOUND LOWER  GASTROINTESTIONAL TRACT (GI);  Surgeon: Daniel Jean-Baptiste MD;  [facility]     EYE SURGERY  12/4/17     INSERT PORT VASCULAR ACCESS Right 12/19/2018    Procedure: Chest Port Placement - right;  Surgeon: Stuart Chavez PA-C;  [facility]     IR CHEST PORT PLACEMENT > 5 YRS OF AGE  12/19/2018     IR PORT REMOVAL RIGHT  6/26/2019     LAPAROSCOPIC CHOLECYSTECTOMY WITH CHOLANGIOGRAMS N/A 11/1/2015    Procedure: LAPAROSCOPIC CHOLECYSTECTOMY WITH CHOLANGIOGRAMS;  Surgeon: Tonie Warren MD;  [facility]     REMOVE PORT VASCULAR ACCESS Right 6/26/2019    Procedure: Right Port Removal;  Surgeon: Froilan Irizarry PA-C;  [facility]     SURGICAL HISTORY OF -   1996    malignant melanoma     SURGICAL HISTORY OF -   1968    thyroid nodule     SURGICAL HISTORY OF -       D & C     ZZC NONSPECIFIC PROCEDURE  2005    colonoscopy polyp repeat 2010     SOCIAL HISTORY:   Social History     Tobacco Use     Smoking status: Never     Smokeless tobacco: Never   Vaping Use     Vaping Use: Never used   Substance Use Topics     Alcohol use: No     Alcohol/week: 0.0 standard drinks     Comment: rare     Drug use: No     FAMILY HISTORY:   Family History   Problem Relation Age of Onset     Cancer Father         dec - esophageal and laryngeal     Heart Disease Mother      Respiratory Mother         dec     Breast Cancer Daughter      Other Cancer Daughter      Thyroid Disease Daughter      Asthma Daughter      Hyperlipidemia Son      Diabetes Son      Anesthesia Reaction No family hx of      Deep Vein Thrombosis (DVT) No family hx of      ALLERGIES:   Allergies   Allergen Reactions     Codeine      CURRENT MEDICATIONS:   Current Outpatient Medications:      alendronate (FOSAMAX) 70 MG tablet, TAKE 1 TABLET EVERY 7 DAYS AT LEAST 60 MINUTES BEFORE BREAKFAST AS DIRECTED, Disp: 12 tablet, Rfl: 2     diltiazem ER (DILT-XR) 180 MG 24 hr capsule, Take 1 capsule (180 mg) by mouth daily, Disp: 90 capsule, Rfl:  0     ferrous sulfate 325 (65 Fe) MG TBEC EC tablet, Take 325 mg by mouth every morning , Disp: , Rfl:      furosemide (LASIX) 20 MG tablet, TAKE 1 TABLET TWICE DAILY IN THE MORNING AND AFTER LUNCH, Disp: 180 tablet, Rfl: 1     irbesartan (AVAPRO) 300 MG tablet, TAKE 1 TABLET EVERY DAY, Disp: 90 tablet, Rfl: 0     levofloxacin (LEVAQUIN) 500 MG tablet, Take one tablet six hours after treatment and one tablet morning after treatment, Disp: 6 tablet, Rfl: 11     lovastatin (MEVACOR) 40 MG tablet, TAKE 1 TABLET AT BEDTIME, Disp: 90 tablet, Rfl: 0     methimazole (TAPAZOLE) 5 MG tablet, TAKE 1 TABLET ALTERNATING WITH 1/2 TABLET EVERY OTHER DAY, Disp: 90 tablet, Rfl: 1     Omega-3 Fatty Acids (FISH OIL) 500 MG CAPS, Take 1 capsule by mouth every morning , Disp: , Rfl:      omeprazole (PRILOSEC) 20 MG DR capsule, TAKE 1 CAPSULE EVERY DAY, Disp: 90 capsule, Rfl: 1     propranolol ER (INDERAL LA) 60 MG 24 hr capsule, TAKE 1 CAPSULE EVERY DAY, Disp: 90 capsule, Rfl: 3     RESTASIS 0.05 % ophthalmic emulsion, , Disp: , Rfl:      rivaroxaban ANTICOAGULANT (XARELTO) 10 MG TABS tablet, Take 1 tablet (10 mg) by mouth daily (with dinner), Disp: 90 tablet, Rfl: 1     rOPINIRole (REQUIP) 0.25 MG tablet, TAKE 1 TABLET IN THE AFTERNOON AND TAKE 2 TABLETS EVERY NIGHT, Disp: 270 tablet, Rfl: 1     sertraline (ZOLOFT) 100 MG tablet, TAKE 1 TABLET EVERY MORNING, Disp: 90 tablet, Rfl: 0     traZODone (DESYREL) 50 MG tablet, TAKE 1/2 TABLET AT BEDTIME, Disp: 45 tablet, Rfl: 3    Current Facility-Administered Medications:      lidocaine (XYLOCAINE) 2 % external gel, , Urethral, Once, Justin Henry MD    PHYSICAL EXAMINATION:  Deferred. Phone visit due to COVID.    LABORATORY DATA:   Recent Labs   Lab Test 12/09/22  1029 12/09/22  1000 10/08/22  1340 09/12/22  1036 09/05/22  1446 05/31/22  2342   NA  --  139 133* 135 135* 139   POTASSIUM  --  4.4 4.0 4.3 4.2 3.7   CHLORIDE  --  101 96* 102 98 102   CO2  --  28 26 27 25 30   ANIONGAP   --  10 11 6 12 7   BUN  --  18.1 19.0 21 19.8 24   CR 1.0 1.05* 0.91 0.99 1.18* 0.99   GLC  --  132* 104* 116* 133* 130*   SHREYA  --  9.4 9.6 9.2 9.2 9.1     Recent Labs   Lab Test 09/02/21  0644 09/01/21  1505 02/03/19  2102 12/12/18  1050 01/16/18  1247 01/16/18  0646 12/13/17  2236 04/18/16  0905 11/02/15  0912 11/01/15  0727 10/31/15  0810   MAG 2.4* 2.3 1.8 2.1 2.1   < > 2.0  --   --  2.0  --    PHOS  --   --   --   --   --   --  2.4* 3.2 3.2 2.1* 2.4*    < > = values in this interval not displayed.     Recent Labs   Lab Test 12/09/22  1000 10/08/22  1340 09/05/22  1446 05/31/22  2342 05/20/22  0856   WBC 7.7 10.6 8.3 11.2* 5.5   HGB 13.3 12.0 11.6* 12.8 12.7    228 210 236 223   MCV 97 96 96 92 96   NEUTROPHIL 78 72 75 78 72     Recent Labs   Lab Test 12/09/22  1000 09/05/22  1446 05/31/22  2342 05/20/22  0856 12/09/21  2356 11/19/21  0911 08/11/21  1031 09/11/18  0612 07/17/18  1346   BILITOTAL 0.5 0.4 0.4 0.5   < > 0.5 0.4   < >  --    ALKPHOS 91 97 103 82   < > 98 88   < >  --    ALT 19 18 21 21   < > 23 17   < >  --    AST 27  --  20 20   < > 15 14   < >  --    ALBUMIN 4.2 4.0 3.7 3.6   < > 3.4 3.5   < >  --    LDH  --   --   --   --   --  180 176  --  204    < > = values in this interval not displayed.     TSH   Date Value Ref Range Status   12/09/2022 2.62 0.30 - 4.20 uIU/mL Final   05/20/2022 2.90 0.40 - 4.00 mU/L Final   09/02/2021 1.24 0.40 - 4.00 mU/L Final   07/05/2021 1.68 0.40 - 4.00 mU/L Final   05/27/2021 1.93 0.40 - 4.00 mU/L Final   01/27/2021 2.42 0.40 - 4.00 mU/L Final     No results for input(s): CEA in the last 47330 hours.  Results for orders placed or performed in visit on 12/09/22   CT Abdomen wo & w Chest Pelvis w Contrast    Narrative    EXAM: CT ABDOMEN WO and W CHEST PELVIS W CONTRAST  LOCATION: [facility]  DATE/TIME: 12/9/2022 10:56 AM    INDICATION: On surveillance after adjuvant chemotherapy for stage IV ([id number]) urothelial Ca  of rt renal pelvis (nephroureterectomy  and  pericaval node Sx 11 13 18)  COMPARISON: 10/08/2022 and prior  TECHNIQUE: CT scan of the chest, abdomen, and pelvis was performed before and after injection of IV contrast. Multiplanar reformats were obtained. Dose reduction techniques were used.  CONTRAST: Isovue 370 97cc    FINDINGS:   LUNGS AND PLEURA: No new or enlarging suspicious nodules. Unchanged 0.5 cm nodule in the left upper lobe (7/86) and calcified granulomas elsewhere. No pleural effusion or pneumothorax.    MEDIASTINUM/AXILLAE: Unchanged enlarged multinodular thyroid gland. No enlarged thoracic lymph nodes.    CORONARY ARTERY CALCIFICATION: Mild.    HEPATOBILIARY: Cholecystectomy with reservoir effect of the bile ducts. No focal liver lesions.    PANCREAS: Normal.    SPLEEN: Normal.    ADRENAL GLANDS: Normal.    KIDNEYS/BLADDER: Right nephroureterectomy. No new or increasing soft tissue in the nephrectomy bed. Nonobstructing 0.4 cm calculus in the interpolar left kidney. Left kidney and bladder are otherwise normal.    BOWEL: No obstruction or inflammatory change. Moderate sized hiatal hernia.    LYMPH NODES: No lymphadenopathy.    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 Mild atherosclerotic calcification elsewhere.    PELVIC ORGANS: Normal.    MUSCULOSKELETAL: Degenerative changes in the spine. No aggressive or destructive osseous lesions.      Impression    IMPRESSION:  1.  Urothelial carcinoma status post right nephroureterectomy. No evidence of recurrent or metastatic disease.    2.  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     *Note: Due to a large number of results and/or encounters for the requested time period, some results have not been displayed. A complete set of results can be found in Results Review.         ASSESSMENT AND PLAN: Ms. Oviedo is a delightful 89 year old lady with localized stage IV ([id number]) high-grade, muscle-invasive transitional cell  carcinoma of right renal pelvis, status post right robotic nephroureterectomy and 4 cycles of adjuvant carbo/gem; as well as NMIBC.    1. Upper tract urothelial cancer:   - She completed 4 cycles of adjuvant carboplatin plus gemcitabine chemotherapy per POUT trial.    She completed adjuvant chemotherapy in Feb 2019 over 3.5 yrs ago  - No residual side effects or evidence of recurrence.  - Reviewed restaging CT scan from May 20th, 2022; there is no clear evidence of disease recurrence in abd/pelvis.   She continues to have pulmonary nodules which remain stable.    Lung findings are non-specific and these are also not responsible for her shortness of breath    - We will continue to monitor and her get a repeat CT C/A/P without contrast in 6 months.   -     2. High grade urothelial carcinoma in situ:  - Bx proven carcinoma in situ in 1/2020, completed the first round of intravesical BCG treatment 2/2020-3/2020 and second in 11/2020-12/2020.  - She was initially followed by Dr. King and now is under care of Dr. Froilan Henry.   - She did not have urine cytology done and I will get it after this visit.   - She will continue on 3 weekly BCG every 3 months as part of her maintenance along with follow up cystoscopies    3. Chemo induced anemia and thrombocytopenia:  - Resolved.      4. Persistent HERMAN:  - She follows Dr. Griffith in Cardiology for her h/o moderate AORTIC STENOSIS, CHF, and Afib now with worsening SOA and lymphedema with no cancer-related etiology evident.     Return to clinic in 6 months with restaging scans.    All of the above was explained to the patient in lay language and several questions were answered. They are in agreement with the plan.     25 minutes spent on the date of the encounter doing chart review, history and exam, documentation and further activities as noted above     Sd Fine    Hematologist and Medical Oncologist  [facility]

## 2022-12-13 NOTE — NURSING NOTE
"Oncology Rooming Note    December 13, 2022 12:45 PM   Dimple Oviedo is a 89 year old female who presents for:    Chief Complaint   Patient presents with     Oncology Clinic Visit     Urothelial cancer      Initial Vitals: BP (!) 146/75 (BP Location: Right arm, Patient Position: Sitting, Cuff Size: Adult Regular)   Pulse 82   Temp 98.9  F (37.2  C) (Oral)   Resp 16   Wt 78.6 kg (173 lb 3.2 oz)   SpO2 96%   BMI 36.06 kg/m   Estimated body mass index is 36.06 kg/m  as calculated from the following:    Height as of 11/15/22: 1.476 m (4' 10.11\").    Weight as of this encounter: 78.6 kg (173 lb 3.2 oz). Body surface area is 1.8 meters squared.  No Pain (0) Comment: Data Unavailable   No LMP recorded. Patient is postmenopausal.  Allergies reviewed: Yes  Medications reviewed: Yes    Medications: Medication refills not needed today.  Pharmacy name entered into [facility]:    [facility] - [id number] - [place], [place] - [address] AT [facility] [address]  [facility] - [place], [place] - [address]    Clinical concerns: No additional clinical concerns.       Josseline Reich), LPN December 13, 2022 12:45 PM              "

## 2022-12-13 NOTE — LETTER
12/13/2022         RE: Dimple Oviedo  [address]  [facility] [id number]        Dear Colleague,    Thank you for referring your patient, Dimple Oviedo, to the [facility]. Please see a copy of my visit note below.    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    HISTORY OF PRESENT ILLNESS:  Ms. 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 Her oncologic history is as under.    Ms. Oviedo is doing well. She denies any complains suggestive of recurrent disease.     She has marked bilateral pedal edema which is her primary complain. She has this since prior to her nephroureterectomy.      ONCOLOGIC HISTORY:  1. 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   - Jan 2018 to September 2018- Persistent gross hematuria and underwent multiple procedures.    - 2/19/18 and 4/3/18- cystoscopies with bladder biopsies  which were negative for bladder tumor  - 1/17/18, 3/8/18, 7/26/18, 9/10/18- Multiple urine cytologies have come back positive by FISH for high-grade transitional cell carcinoma  - 9/10/18- Underwent a bilateral cystoureteroscopy with biopsy and brushing that showed  right upper tract cytology suspicious for high-grade urothelial ca. Right ureter brushing negative from that day. Left upper tract negative.  - 9/10/18- CT A/P without contrast showed new small bilateral pleural effusions and interstitial pulmonary edema but no evidence of locoregional or metastatic disease.  - 9/12/18- Presented to ED with oliguria, CRESENCIO, and mild hydronephrosis bilaterally on CT scan and underwent bilateral  "ureteral stent placment  - 11/13/2018- Right robotic nephroureterectomy, excision of sandip-caval mass and LN excision by Stuart King. Pathology showed \"Right nephroureterectomy: Invasive high grade urothelial carcinoma measuring 3.5 cm, located in renal pelvis and invading through renal parenchyma into perinephric fat. Urothelial carcinoma in-situ present. Margins free of tumor. Sandip-caval mass: Metastatic urothelial carcinoma involving one lymph node with at least 1 cm tumor deposit. Pericaval Extranodal Extension present. Five additional benign pericaval lymph nodes. Pathologic stage: pT4N1 (1 of 6 lymph nodes).\"  - 12/11/18- PET/CT whole body demonstrated significant residual FDG avid disease. For example, \"there is an FDG avid ill-defined pericaval mass immediately medial to the surgical clips measuring approximately 2.0 x 1.4 cm, with max SUV measuring up to12.2, see series 4 image 21. Additional FDG avid retrocaval and interaortocaval lymphadenopathy are noted.There is a 1.2 cm nodule in the right hemipelvis posterior lateral tothe bladder with max SUV measuring 8.3, see series 4 image 77. The bladder is incompletely distended.\" No suspicious thoracic or bone uptake.  - 12/12/18- Started adjuvant chemotherapy (carbo+gem) per POUT trial. Cycle 2 - 1/2/19. Cycle 3 - 1/23/19. Cycle 4 - 02/13/19.  - 1/22/19 - CT C/A/P with contrast compared with prior PET/CT: \"1. New well-circumscribed soft tissue pulmonary nodules of the right lower lobe and left upper lobe. Findings could be infectious, inflammatory, or represent metastatic disease. Continued attention on follow-up recommended. Postoperative changes of right nephrectomy. No evidence for local recurrence in the present study.\"  - 3/1/2019- CT C/A/P with contrast - \"1. Bilateral pulmonary nodules measuring up to 4 mm in the right lower lobe, previously measuring 8 mm. No new or enlarging pulmonary nodules. 2. No convincing evidence for metastatic disease in " "the abdomen, pelvis and bones.\"  - 6/3/2019- CT C/A/P with contrast - \"1. Surgical changes of right nephrectomy without evidence of residual or recurrent disease on this noncontrast exam.  Consider contrast enhanced examination on follow-up. 2. No evidence of metastatic disease in the abdomen, or pelvis on noncontrast CT.  Scattered tiny pulmonary nodules in the chest are not significantly changed.  Previously described prominent right lower lobe pulmonary nodule (previously measuring up to 8 mm) is no longer visualized. 3. Stable bilateral pulmonary nodules measuring maximally 4 mm.\"  - 09/11/19: CT C/A/P without contrast - \"1. Stable exam without evidence convincing for new disease. 2. Stable pulmonary nodules. 3. 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 4. Stable heterogeneous enlargement of the thyroid with underlying nodules suggested.\"  - 12/4/19: CT C/A/P without contrast - \"No acute change since prior exam. No evidence of recurrent or metastatic disease. Tiny lung nodules are stable. Prior right nephrectomy. Left renal artery aneurysm is stable.\"  - 04/08/20, 7/27/20: CT C/A/P with contrast - GABRIELLE.  - 01/12/21: CT C/A/P with contrast - \"1.  Slight increased size of a 6 mm left upper lobe nodule and new 4 mm linear opacity along the right major fissure. These are indeterminate but could represent progression of metastatic disease. 2.  Slight increased size of mildly enlarged mediastinal and hilar lymph nodes. 3.  Mild pulmonary edema. 4.  No recurrent or metastatic disease in the abdomen and pelvis. 5.  Mild stranding around the urinary bladder dome may be related to cystitis. Punctate focus of gas within the urinary bladder either secondary to infection or instrumentation. 6.  Enlarged multinodular thyroid gland.\"    2. Non-muscle invasive bladder cancer:  - 10/3/19: Cystoscopy showed a small area of erythema on retroflexion, possibly pre-existing or due to retroflexion of the scope. Urine " cytology from that time showed cells suspicious for malignancy.   - 1/13/20: Bladder biopsy showed high grade urothelial carcinoma in-situ  - 2/12/20-3/18/20: Intravesical BCG therapy  - 7/24/20 and last cystoscopy was on the same day. It was negative. However, urine cytology was positive for high-grade urothelial carcinoma.   - 11/25/20-12/10/2020: 3 weekly doses of intravesical BCG 50mg.   - 12/10/20: Cytology suspicious and FISH urovysion positive for TCC.    REVIEW OF SYSTEMS: 14 point ROS negative other than the symptoms noted above in the HPI.    PAST MEDICAL AND SURGICAL HISTORY:   Past Medical History:   Diagnosis Date     CRESENCIO (acute kidney injury) (H) 9/11/2018     Arthritis      Asthma 2/9/2022     Calculus of kidney      Chemotherapy-induced neutropenia (H) 3/6/2019     Congestive heart failure (H)      Esophageal reflux      GERD (gastroesophageal reflux disease)      Hyperlipidemia LDL goal <130 5/9/2010     Malignant melanoma of skin of trunk, except scrotum (H)      Nonspecific abnormal finding     has living will 2004 -      Nontoxic multinodular goiter     no further eval /tx rec per pt     Osteopenia      Other psoriasis      Personal history of colonic polyps      PMR (polymyalgia rheumatica) (H)      Restless legs syndrome 7/11/2018     Stress-induced cardiomyopathy      Undiagnosed cardiac murmurs      Unspecified constipation      Unspecified essential hypertension      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     Past Surgical History:   Procedure Laterality Date     ARTHROPLASTY KNEE Right 11/9/2020    Procedure: ARTHROPLASTY, KNEE, TOTAL, RIGHT;  Surgeon: Edwadr Otero MD;  [facility]     BIOPSY       COLONOSCOPY  2014     COMBINED CYSTOSCOPY, INSERT STENT URETER(S) Bilateral 9/12/2018    Procedure: COMBINED CYSTOSCOPY, INSERT STENT URETER(S);  Cystoscopy Bilateral ureteral Stent Placement.;  Surgeon: Justin Henry MD;  [facility]     COMBINED CYSTOSCOPY, RETROGRADES,  URETEROSCOPY, INSERT STENT Bilateral 4/3/2018    Procedure: COMBINED CYSTOSCOPY, RETROGRADES, URETEROSCOPY, INSERT STENT;;  Surgeon: Stuart King MD;  [facility]     COMBINED CYSTOSCOPY, RETROGRADES, URETEROSCOPY, INSERT STENT Bilateral 9/10/2018    Procedure: COMBINED CYSTOSCOPY, RETROGRADES, URETEROSCOPY, INSERT STENT;  Cystoscopy, Bilateral Ureteroscopy, Bladder Biopsies, Retrogram Pyelograms, Ureteral Washings and brushings, cysview;  Surgeon: Stuart King MD;  [facility]     COMBINED CYSTOSCOPY, RETROGRADES, URETEROSCOPY, INSERT STENT Left 8/26/2020    Procedure: Cystoscopy, Left Ureteral Wash, Left ureteral Retrograde Pyelogram;  Surgeon: Justin Henry MD;  [facility]     CYSTOSCOPY, BIOPSY BLADDER INSTILL OPTICAL AGENT N/A 4/3/2018    Procedure: CYSTOSCOPY, BIOPSY BLADDER INSTILL OPTICAL AGENT;  Cystoscopy, Blue Light Cystoscopy, Bladder Biopsies, Bilateral Selective ureteral washings for Cytology, Bilateral Retrograde Pyelograms, Bilateral Ureteroscopy;  Surgeon: Stuart King MD;  [facility]     CYSTOSCOPY, BIOPSY BLADDER, COMBINED N/A 2/19/2018    Procedure: COMBINED CYSTOSCOPY, BIOPSY BLADDER;  Cystoscopy, Bladder Biopsy;  Surgeon: Kenna La MD;  [facility]     CYSTOSCOPY, BIOPSY BLADDER, COMBINED N/A 8/26/2020    Procedure: Cystoscopy, biopsy bladder, combined;  Surgeon: Justin Henry MD;  [facility]     CYSTOSCOPY, FULGURATE BLADDER TUMOR, COMBINED N/A 1/13/2020    Procedure: CYSTOSCOPY, WITH  bladder biopsies and fulguration;  Surgeon: Stuart King MD;  [facility]     CYSTOSCOPY, REMOVE STENT(S), COMBINED  11/13/2018    Procedure: Flexible Cystoscopy with Stent Removal;  Surgeon: Stuart King MD;  [facility]     DAVINCI LYMPHADENECTOMY N/A 11/13/2018    Procedure: Davinci Lymphadenectomy ;  Surgeon: Stuart King MD;  [facility]     DAVINCI  NEPHROURETERECTOMY N/A 11/13/2018    Procedure: Right DaVinci Assisted Nephroureterectomy;  Surgeon: Stuart King MD;  [facility]     ENDOSCOPIC ULTRASOUND LOWER GASTROINTESTIONAL TRACT (GI) N/A 10/30/2015    Procedure: ENDOSCOPIC ULTRASOUND LOWER GASTROINTESTIONAL TRACT (GI);  Surgeon: Daniel Jean-Baptiste MD;  [facility]     EYE SURGERY  12/4/17     INSERT PORT VASCULAR ACCESS Right 12/19/2018    Procedure: Chest Port Placement - right;  Surgeon: Stuart Chavez PA-C;  [facility]     IR CHEST PORT PLACEMENT > 5 YRS OF AGE  12/19/2018     IR PORT REMOVAL RIGHT  6/26/2019     LAPAROSCOPIC CHOLECYSTECTOMY WITH CHOLANGIOGRAMS N/A 11/1/2015    Procedure: LAPAROSCOPIC CHOLECYSTECTOMY WITH CHOLANGIOGRAMS;  Surgeon: Tonie Warren MD;  [facility]     REMOVE PORT VASCULAR ACCESS Right 6/26/2019    Procedure: Right Port Removal;  Surgeon: Froilan Irizarry PA-C;  [facility]     SURGICAL HISTORY OF -   1996    malignant melanoma     SURGICAL HISTORY OF -   1968    thyroid nodule     SURGICAL HISTORY OF -       D & C     ZZC NONSPECIFIC PROCEDURE  2005    colonoscopy polyp repeat 2010     SOCIAL HISTORY:   Social History     Tobacco Use     Smoking status: Never     Smokeless tobacco: Never   Vaping Use     Vaping Use: Never used   Substance Use Topics     Alcohol use: No     Alcohol/week: 0.0 standard drinks     Comment: rare     Drug use: No     FAMILY HISTORY:   Family History   Problem Relation Age of Onset     Cancer Father         dec - esophageal and laryngeal     Heart Disease Mother      Respiratory Mother         dec     Breast Cancer Daughter      Other Cancer Daughter      Thyroid Disease Daughter      Asthma Daughter      Hyperlipidemia Son      Diabetes Son      Anesthesia Reaction No family hx of      Deep Vein Thrombosis (DVT) No family hx of      ALLERGIES:   Allergies   Allergen Reactions     Codeine      CURRENT MEDICATIONS:   Current Outpatient  Medications:      alendronate (FOSAMAX) 70 MG tablet, TAKE 1 TABLET EVERY 7 DAYS AT LEAST 60 MINUTES BEFORE BREAKFAST AS DIRECTED, Disp: 12 tablet, Rfl: 2     diltiazem ER (DILT-XR) 180 MG 24 hr capsule, Take 1 capsule (180 mg) by mouth daily, Disp: 90 capsule, Rfl: 0     ferrous sulfate 325 (65 Fe) MG TBEC EC tablet, Take 325 mg by mouth every morning , Disp: , Rfl:      furosemide (LASIX) 20 MG tablet, TAKE 1 TABLET TWICE DAILY IN THE MORNING AND AFTER LUNCH, Disp: 180 tablet, Rfl: 1     irbesartan (AVAPRO) 300 MG tablet, TAKE 1 TABLET EVERY DAY, Disp: 90 tablet, Rfl: 0     levofloxacin (LEVAQUIN) 500 MG tablet, Take one tablet six hours after treatment and one tablet morning after treatment, Disp: 6 tablet, Rfl: 11     lovastatin (MEVACOR) 40 MG tablet, TAKE 1 TABLET AT BEDTIME, Disp: 90 tablet, Rfl: 0     methimazole (TAPAZOLE) 5 MG tablet, TAKE 1 TABLET ALTERNATING WITH 1/2 TABLET EVERY OTHER DAY, Disp: 90 tablet, Rfl: 1     Omega-3 Fatty Acids (FISH OIL) 500 MG CAPS, Take 1 capsule by mouth every morning , Disp: , Rfl:      omeprazole (PRILOSEC) 20 MG DR capsule, TAKE 1 CAPSULE EVERY DAY, Disp: 90 capsule, Rfl: 1     propranolol ER (INDERAL LA) 60 MG 24 hr capsule, TAKE 1 CAPSULE EVERY DAY, Disp: 90 capsule, Rfl: 3     RESTASIS 0.05 % ophthalmic emulsion, , Disp: , Rfl:      rivaroxaban ANTICOAGULANT (XARELTO) 10 MG TABS tablet, Take 1 tablet (10 mg) by mouth daily (with dinner), Disp: 90 tablet, Rfl: 1     rOPINIRole (REQUIP) 0.25 MG tablet, TAKE 1 TABLET IN THE AFTERNOON AND TAKE 2 TABLETS EVERY NIGHT, Disp: 270 tablet, Rfl: 1     sertraline (ZOLOFT) 100 MG tablet, TAKE 1 TABLET EVERY MORNING, Disp: 90 tablet, Rfl: 0     traZODone (DESYREL) 50 MG tablet, TAKE 1/2 TABLET AT BEDTIME, Disp: 45 tablet, Rfl: 3    Current Facility-Administered Medications:      lidocaine (XYLOCAINE) 2 % external gel, , Urethral, Once, Justin Henry MD    PHYSICAL EXAMINATION:  Deferred. Phone visit due to  COVID.    LABORATORY DATA:   Recent Labs   Lab Test 12/09/22  1029 12/09/22  1000 10/08/22  1340 09/12/22  1036 09/05/22  1446 05/31/22  2342   NA  --  139 133* 135 135* 139   POTASSIUM  --  4.4 4.0 4.3 4.2 3.7   CHLORIDE  --  101 96* 102 98 102   CO2  --  28 26 27 25 30   ANIONGAP  --  10 11 6 12 7   BUN  --  18.1 19.0 21 19.8 24   CR 1.0 1.05* 0.91 0.99 1.18* 0.99   GLC  --  132* 104* 116* 133* 130*   SHREYA  --  9.4 9.6 9.2 9.2 9.1     Recent Labs   Lab Test 09/02/21  0644 09/01/21  1505 02/03/19  2102 12/12/18  1050 01/16/18  1247 01/16/18  0646 12/13/17  2236 04/18/16  0905 11/02/15  0912 11/01/15  0727 10/31/15  0810   MAG 2.4* 2.3 1.8 2.1 2.1   < > 2.0  --   --  2.0  --    PHOS  --   --   --   --   --   --  2.4* 3.2 3.2 2.1* 2.4*    < > = values in this interval not displayed.     Recent Labs   Lab Test 12/09/22  1000 10/08/22  1340 09/05/22  1446 05/31/22  2342 05/20/22  0856   WBC 7.7 10.6 8.3 11.2* 5.5   HGB 13.3 12.0 11.6* 12.8 12.7    228 210 236 223   MCV 97 96 96 92 96   NEUTROPHIL 78 72 75 78 72     Recent Labs   Lab Test 12/09/22  1000 09/05/22  1446 05/31/22  2342 05/20/22  0856 12/09/21  2356 11/19/21  0911 08/11/21  1031 09/11/18  0612 07/17/18  1346   BILITOTAL 0.5 0.4 0.4 0.5   < > 0.5 0.4   < >  --    ALKPHOS 91 97 103 82   < > 98 88   < >  --    ALT 19 18 21 21   < > 23 17   < >  --    AST 27  --  20 20   < > 15 14   < >  --    ALBUMIN 4.2 4.0 3.7 3.6   < > 3.4 3.5   < >  --    LDH  --   --   --   --   --  180 176  --  204    < > = values in this interval not displayed.     TSH   Date Value Ref Range Status   12/09/2022 2.62 0.30 - 4.20 uIU/mL Final   05/20/2022 2.90 0.40 - 4.00 mU/L Final   09/02/2021 1.24 0.40 - 4.00 mU/L Final   07/05/2021 1.68 0.40 - 4.00 mU/L Final   05/27/2021 1.93 0.40 - 4.00 mU/L Final   01/27/2021 2.42 0.40 - 4.00 mU/L Final     No results for input(s): CEA in the last 47711 hours.  Results for orders placed or performed in visit on 12/09/22   CT Abdomen wo & w  Chest Pelvis w Contrast    Narrative    EXAM: CT ABDOMEN WO and W CHEST PELVIS W CONTRAST  LOC[facility]  DATE/TIME: 12/9/2022 10:56 AM    INDICATION: On surveillance after adjuvant chemotherapy for stage IV ([id number]) urothelial Ca of rt renal pelvis (nephroureterectomy  and  pericaval node Sx 11 13 18)  COMPARISON: 10/08/2022 and prior  TECHNIQUE: CT scan of the chest, abdomen, and pelvis was performed before and after injection of IV contrast. Multiplanar reformats were obtained. Dose reduction techniques were used.  CONTRAST: Isovue 370 97cc    FINDINGS:   LUNGS AND PLEURA: No new or enlarging suspicious nodules. Unchanged 0.5 cm nodule in the left upper lobe (7/86) and calcified granulomas elsewhere. No pleural effusion or pneumothorax.    MEDIASTINUM/AXILLAE: Unchanged enlarged multinodular thyroid gland. No enlarged thoracic lymph nodes.    CORONARY ARTERY CALCIFICATION: Mild.    HEPATOBILIARY: Cholecystectomy with reservoir effect of the bile ducts. No focal liver lesions.    PANCREAS: Normal.    SPLEEN: Normal.    ADRENAL GLANDS: Normal.    KIDNEYS/BLADDER: Right nephroureterectomy. No new or increasing soft tissue in the nephrectomy bed. Nonobstructing 0.4 cm calculus in the interpolar left kidney. Left kidney and bladder are otherwise normal.    BOWEL: No obstruction or inflammatory change. Moderate sized hiatal hernia.    LYMPH NODES: No lymphadenopathy.    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 Mild atherosclerotic calcification elsewhere.    PELVIC ORGANS: Normal.    MUSCULOSKELETAL: Degenerative changes in the spine. No aggressive or destructive osseous lesions.      Impression    IMPRESSION:  1.  Urothelial carcinoma status post right nephroureterectomy. No evidence of recurrent or metastatic disease.    2.  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     *Note: Due to a large number of results and/or encounters  for the requested time period, some results have not been displayed. A complete set of results can be found in Results Review.         ASSESSMENT AND PLAN: Ms. Oviedo is a delightful 89 year old lady with localized stage IV ([id number]) high-grade, muscle-invasive transitional cell carcinoma of right renal pelvis, status post right robotic nephroureterectomy and 4 cycles of adjuvant carbo/gem; as well as NMIBC.    1. Upper tract urothelial cancer:   - She completed 4 cycles of adjuvant carboplatin plus gemcitabine chemotherapy per POUT trial.    She completed adjuvant chemotherapy in Feb 2019 over 3.5 yrs ago  - No residual side effects or evidence of recurrence.  - Reviewed restaging CT scan from May 20th, 2022; there is no clear evidence of disease recurrence in abd/pelvis.   She continues to have pulmonary nodules which remain stable.    Lung findings are non-specific and these are also not responsible for her shortness of breath    - We will continue to monitor and her get a repeat CT C/A/P without contrast in 6 months.   -     2. High grade urothelial carcinoma in situ:  - Bx proven carcinoma in situ in 1/2020, completed the first round of intravesical BCG treatment 2/2020-3/2020 and second in 11/2020-12/2020.  - She was initially followed by Dr. King and now is under care of Dr. Froilan Henry.   - She did not have urine cytology done and I will get it after this visit.   - She will continue on 3 weekly BCG every 3 months as part of her maintenance along with follow up cystoscopies    3. Chemo induced anemia and thrombocytopenia:  - Resolved.      4. Persistent HERMAN:  - She follows Dr. Griffith in Cardiology for her h/o moderate AORTIC STENOSIS, CHF, and Afib now with worsening SOA and lymphedema with no cancer-related etiology evident.     Return to clinic in 6 months with restaging scans.    All of the above was explained to the patient in lay language and several questions were answered. They are in agreement  with the plan.     25 minutes spent on the date of the encounter doing chart review, history and exam, documentation and further activities as noted above           Again, thank you for allowing me to participate in the care of your patient.      Sincerely,    Sd Fine MD

## 2022-12-30 DIAGNOSIS — I48.20 CHRONIC ATRIAL FIBRILLATION (H): ICD-10-CM

## 2023-01-02 DIAGNOSIS — K21.9 GASTROESOPHAGEAL REFLUX DISEASE WITHOUT ESOPHAGITIS: ICD-10-CM

## 2023-01-03 NOTE — TELEPHONE ENCOUNTER
DILTIAZEM HYDROCHLORIDE  MG Capsule Extended Release 24 Hour   Last Written Prescription Date:  10/27/22  Last Fill Quantity: 90,   # refills: 0  Last Office Visit : 11/15/22  Future Office visit:  none    Routing refill request to provider for review/approval because:  Abnormal Creatinine  Creatinine   Date Value Ref Range Status   12/09/2022 1.05 (H) 0.51 - 0.95 mg/dL Final   05/11/2021 1.04 0.52 - 1.04 mg/dL Final     Creatinine POCT   Date Value Ref Range Status   12/09/2022 1.0 0.5 - 1.0 mg/dL Final

## 2023-01-03 NOTE — TELEPHONE ENCOUNTER
Omeprazole has been refilled one time for 90 days . Due for appt with PCP in Dec and this was not done    ,    Please call pt to help her make this appt for yearly exam    Thank you,  Radha See RN  [facility]

## 2023-01-05 RX ORDER — DILTIAZEM HYDROCHLORIDE 180 MG/1
180 CAPSULE, EXTENDED RELEASE ORAL DAILY
Qty: 90 CAPSULE | Refills: 3 | Status: SHIPPED | OUTPATIENT
Start: 2023-01-05 | End: 2023-12-18

## 2023-01-07 DIAGNOSIS — I48.20 CHRONIC ATRIAL FIBRILLATION (H): ICD-10-CM

## 2023-01-10 NOTE — TELEPHONE ENCOUNTER
rivaroxaban ANTICOAGULANT (XARELTO) 10 MG TABS tablet      Last Written Prescription Date:  2/17/2022  Last Fill Quantity: 90,   # refills: 1  Last Office Visit : 11/15/2022  Future Office visit:  none    Routing refill request to provider for review/approval because:  Refill needs review- dose.  - requested rivaroxaban ANTICOAGULANT (XARELTO) as 15 mg tab  - last ordered as 10 mg tabs   - gap in time last order 2/17/2022 for 3 mo supply + 1 refill 10 mg tabs

## 2023-01-16 DIAGNOSIS — M85.80 OSTEOPENIA, UNSPECIFIED LOCATION: ICD-10-CM

## 2023-01-17 DIAGNOSIS — G25.81 RESTLESS LEGS SYNDROME: ICD-10-CM

## 2023-01-18 ENCOUNTER — NURSE TRIAGE (OUTPATIENT)
Dept: FAMILY MEDICINE | Facility: CLINIC | Age: 88
End: 2023-01-18

## 2023-01-18 ENCOUNTER — OFFICE VISIT (OUTPATIENT)
Dept: FAMILY MEDICINE | Facility: CLINIC | Age: 88
End: 2023-01-18
Payer: MEDICARE

## 2023-01-18 VITALS
OXYGEN SATURATION: 95 % | DIASTOLIC BLOOD PRESSURE: 64 MMHG | HEART RATE: 83 BPM | SYSTOLIC BLOOD PRESSURE: 118 MMHG | RESPIRATION RATE: 22 BRPM | HEIGHT: 58 IN | WEIGHT: 176 LBS | BODY MASS INDEX: 36.94 KG/M2 | TEMPERATURE: 97 F

## 2023-01-18 DIAGNOSIS — G25.81 RESTLESS LEGS SYNDROME: ICD-10-CM

## 2023-01-18 DIAGNOSIS — R39.9 SYMPTOMS INVOLVING URINARY SYSTEM: Primary | ICD-10-CM

## 2023-01-18 DIAGNOSIS — N30.00 ACUTE CYSTITIS WITHOUT HEMATURIA: ICD-10-CM

## 2023-01-18 DIAGNOSIS — I10 ESSENTIAL HYPERTENSION WITH GOAL BLOOD PRESSURE LESS THAN 140/90: ICD-10-CM

## 2023-01-18 LAB
ALBUMIN UR-MCNC: NEGATIVE MG/DL
APPEARANCE UR: CLEAR
BACTERIA #/AREA URNS HPF: ABNORMAL /HPF
BILIRUB UR QL STRIP: NEGATIVE
COLOR UR AUTO: YELLOW
GLUCOSE UR STRIP-MCNC: NEGATIVE MG/DL
HGB UR QL STRIP: ABNORMAL
KETONES UR STRIP-MCNC: NEGATIVE MG/DL
LEUKOCYTE ESTERASE UR QL STRIP: ABNORMAL
NITRATE UR QL: NEGATIVE
PH UR STRIP: 5.5 [PH] (ref 5–7)
RBC #/AREA URNS AUTO: ABNORMAL /HPF
SP GR UR STRIP: <=1.005 (ref 1–1.03)
UROBILINOGEN UR STRIP-ACNC: 0.2 E.U./DL
WBC #/AREA URNS AUTO: ABNORMAL /HPF

## 2023-01-18 PROCEDURE — 99214 OFFICE O/P EST MOD 30 MIN: CPT | Performed by: NURSE PRACTITIONER

## 2023-01-18 PROCEDURE — 81001 URINALYSIS AUTO W/SCOPE: CPT | Performed by: NURSE PRACTITIONER

## 2023-01-18 PROCEDURE — 87086 URINE CULTURE/COLONY COUNT: CPT | Performed by: NURSE PRACTITIONER

## 2023-01-18 PROCEDURE — 87186 SC STD MICRODIL/AGAR DIL: CPT | Performed by: NURSE PRACTITIONER

## 2023-01-18 RX ORDER — CEFDINIR 300 MG/1
300 CAPSULE ORAL 2 TIMES DAILY
Qty: 20 CAPSULE | Refills: 0 | Status: SHIPPED | OUTPATIENT
Start: 2023-01-18 | End: 2023-03-15

## 2023-01-18 RX ORDER — ROPINIROLE 0.25 MG/1
TABLET, FILM COATED ORAL
Qty: 270 TABLET | Refills: 0 | Status: SHIPPED | OUTPATIENT
Start: 2023-01-18 | End: 2023-03-15

## 2023-01-18 NOTE — TELEPHONE ENCOUNTER
"Call received from patient:  1. Thinks she has UTI  2. Cloudy urine   A. Two days  4. Afebrile  5. Has had UTI \"many times before\"  6. No dysuria, hematuria  7. Denied back, abdominal pain  8. Would like urine tested    Writer recommended:  1. Office visit today for evaluation and stay well hydrated    Patient verbalized understanding and in agreement with plan.    Office visit scheduled with VICTORIA Osman CNP, at 1310. Visit date, time and location confirmed with patient.        Reason for Disposition    Patient wants to be seen    Additional Information    Negative: Shock suspected (e.g., cold/pale/clammy skin, too weak to stand, low BP, rapid pulse)    Negative: Sounds like a life-threatening emergency to the triager    Negative: Followed a female genital area injury (e.g., vagina, vulva)    Negative: Followed a male genital area injury (penis, scrotum)    Negative: Vaginal discharge    Negative: Pus (white, yellow) or bloody discharge from end of penis    Negative: Pain or burning with passing urine (urination) and pregnant    Negative: Pain or burning with passing urine (urination) and female    Negative: Pain or burning with passing urine (urination) and male    Negative: Pain or itching in the vulvar area    Negative: Pain in scrotum is main symptom    Negative: Blood in the urine is main symptom    Negative: Symptoms arising from use of a urinary catheter (e.g., coude, Diehl)    Negative: Unable to urinate (or only a few drops) > 4 hours and bladder feels very full (e.g., palpable bladder or strong urge to urinate)    Negative: Fever > 100.4 F  (38.0 C)    Negative: Side (flank) or lower back pain present    Negative: Can't control passage of urine (i.e., urinary incontinence) and new-onset (< 2 weeks) or worsening    Negative: Urinating more frequently than usual (i.e., frequency)    Negative: Bad or foul-smelling urine    Negative: Decreased urination and drinking very little and dehydration suspected (e.g., " dark urine, no urine > 12 hours, very dry mouth, very lightheaded)    Negative: Patient sounds very sick or weak to the triager    Protocols used: URINARY SYMPTOMS-[place]-SHASHANK BetheaN, RN-BC  [facility]

## 2023-01-18 NOTE — TELEPHONE ENCOUNTER
Routing refill request to provider for review/approval because:  Labs not current:  creatinine   Dexa on file within past 2 years (last done 11/20/08)    Last Written Prescription Date:  3/9/22  Last Fill Quantity: 12,  # refills: 2   Last office visit: 9/12/2022 with prescribing provider:  Curry, seen today by Jacqui for UTI   Future Office Visit:      ITALO Mckinney RN  [facility]

## 2023-01-18 NOTE — PROGRESS NOTES
Assessment & Plan     (R39.9) Symptoms involving urinary system  (primary encounter diagnosis)  Comment:   Plan: UA with Microscopic reflex to Culture - lab         collect, Urine Microscopic, Urine Culture        See below    (N30.00) Acute cystitis without hematuria  Comment:   Plan: cefdinir (OMNICEF) 300 MG capsule        Will treat with the antibiotic she had back in September.  Will await culture results.  Follow up if symptoms are not improving in the next 48 hours or sooner if worsening.     (G25.81) Restless legs syndrome  Comment: stable  Plan: rOPINIRole (REQUIP) 0.25 MG tablet        Refill given.  Recommended scheduling her AWV with PCP.     (I10) Essential hypertension with goal blood pressure less than 140/90  Comment: at goal  Plan: The current medical regimen is effective;  continue present plan and medications.                  No follow-ups on file.    Charu Osman NP  [facility]    Davey Musa is a 89 year old, presenting for the following health issues:  Urinary Problem      HPI     Genitourinary - Female  Onset/Duration: 2 days  Description:   Painful urination (Dysuria): No           Frequency: No  Blood in urine (Hematuria): No  Delay in urine (Hesitancy): YES  Cloudy urine  Intensity: severe  Progression of Symptoms:  intermittent  Accompanying Signs & Symptoms:  Fever/chills: No  Flank pain: No  Nausea and vomiting: No  Vaginal symptoms: none  Abdominal/Pelvic Pain: No  History:   History of frequent UTI s: YES  History of kidney stones: No  Sexually Active: No  Possibility of pregnancy: No  Precipitating or alleviating factors: None  Therapies tried and outcome:  none      She has a history of UTIs, most recent in September.  She gets about one per year typically.  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            Review of Systems         Objective    /64 (BP Location: Left arm, Patient Position: Sitting, Cuff Size:  "Adult Regular)   Pulse 83   Temp 97  F (36.1  C) (Temporal)   Resp 22   Ht 1.47 m (4' 9.87\")   Wt 79.8 kg (176 lb)   SpO2 95%   BMI 36.94 kg/m    Body mass index is 36.94 kg/m .  Physical Exam   GENERAL: healthy, alert and no distress  ABDOMEN: soft, nontender, no hepatosplenomegaly, no masses and bowel sounds normal  BACK: no CVA tenderness, no paralumbar tenderness  PSYCH: mentation appears normal, affect normal/bright    Results for orders placed or performed in visit on 01/18/23 (from the past 24 hour(s))   UA with Microscopic reflex to Culture - lab collect    Specimen: Urine, Midstream   Result Value Ref Range    Color Urine Yellow Colorless, Straw, Light Yellow, Yellow    Appearance Urine Clear Clear    Glucose Urine Negative Negative mg/dL    Bilirubin Urine Negative Negative    Ketones Urine Negative Negative mg/dL    Specific Gravity Urine <=1.005 1.003 - 1.035    Blood Urine Trace (A) Negative    pH Urine 5.5 5.0 - 7.0    Protein Albumin Urine Negative Negative mg/dL    Urobilinogen Urine 0.2 0.2, 1.0 E.U./dL    Nitrite Urine Negative Negative    Leukocyte Esterase Urine Moderate (A) Negative   Urine Microscopic   Result Value Ref Range    Bacteria Urine Moderate (A) None Seen /HPF    RBC Urine 2-5 (A) 0-2 /HPF /HPF    WBC Urine 25-50 (A) 0-5 /HPF /HPF     *Note: Due to a large number of results and/or encounters for the requested time period, some results have not been displayed. A complete set of results can be found in Results Review.                   "

## 2023-01-19 LAB — BACTERIA UR CULT: ABNORMAL

## 2023-01-19 RX ORDER — ALENDRONATE SODIUM 70 MG/1
TABLET ORAL
Qty: 12 TABLET | Refills: 2 | Status: SHIPPED | OUTPATIENT
Start: 2023-01-19 | End: 2023-12-18

## 2023-01-19 RX ORDER — ROPINIROLE 0.25 MG/1
TABLET, FILM COATED ORAL
Qty: 270 TABLET | Refills: 0 | Status: SHIPPED | OUTPATIENT
Start: 2023-01-19 | End: 2023-03-15

## 2023-01-19 NOTE — TELEPHONE ENCOUNTER
---Prescription approved per [facility] Refill Protocol.       Sana Pro RN BSN     [facility]      --Last visit:  1/18/23 Cl[place]

## 2023-01-20 DIAGNOSIS — E05.00 GRAVES DISEASE: ICD-10-CM

## 2023-01-20 DIAGNOSIS — E04.2 NONTOXIC MULTINODULAR GOITER: ICD-10-CM

## 2023-01-23 NOTE — PATIENT INSTRUCTIONS
Patient Education   Personalized Prevention Plan  You are due for the preventive services outlined below.  Your care team is available to assist you in scheduling these services.  If you have already completed any of these items, please share that information with your care team to update in your medical record.  Health Maintenance Due   Topic Date Due     Heart Failure Action Plan  06/23/1933     Cholesterol Lab  10/04/2020     FALL RISK ASSESSMENT  10/04/2020         No

## 2023-01-24 DIAGNOSIS — E04.1 THYROID NODULE: Primary | ICD-10-CM

## 2023-01-24 RX ORDER — METHIMAZOLE 5 MG/1
TABLET ORAL
Qty: 66 TABLET | Refills: 3 | Status: SHIPPED | OUTPATIENT
Start: 2023-01-24 | End: 2024-05-07

## 2023-01-24 NOTE — TELEPHONE ENCOUNTER
methimazole (TAPAZOLE) 5 MG tablet    Last Written Prescription Date:  09-  Last Fill Quantity: 90,   # refills: 1  Last Office Visit : 05-  Future Office visit  6-    Routing refill request to provider for review/approval because:  Drug not on the [facility], [facility] or [facility] refill protocol or controlled substance

## 2023-02-06 ENCOUNTER — TELEPHONE (OUTPATIENT)
Dept: CARDIOLOGY | Facility: CLINIC | Age: 88
End: 2023-02-06
Payer: MEDICARE

## 2023-02-06 DIAGNOSIS — I48.20 CHRONIC ATRIAL FIBRILLATION (H): ICD-10-CM

## 2023-02-06 NOTE — TELEPHONE ENCOUNTER
One month supply of Xarelto 10mg sent to local pharmacy  Lianna Carlisle RN on 2/6/2023 at 8:42 AM

## 2023-02-06 NOTE — TELEPHONE ENCOUNTER
[facility] Call Center    Phone Message    May a detailed message be left on voicemail: yes     Reason for Call: Medication Refill Request    Has the patient contacted the pharmacy for the refill? Yes   Name of medication being requested: rivaroxaban ANTICOAGULANT (XARELTO) 10 MG TABS tablet  Provider who prescribed the medication: Dr. Griffith  Pharmacy:    [id number] - [place], [place] - [address] AT [facility] OF [address]      Date medication is needed: 02/06/2023       **PT LOST HER BOTTLE OF XARELTO AND PT INSURANCE DOESN'T COVER LOST OR STOLEN MEDICATION. PT UNDERSTANDS SHE WILL HAVE TO PAY OUT OF POCKET BUT SHE IS REQUESTING A 30 DAY SUPPLY IN HOPES SHE CAN FIND HER BOTTLE OF MEDICATION. PLEASE CALL PT IF THERE ARE ANY QUESTIONS THANK YOU.   **PT ONLY HAS 2 PILLS LEFT      Action Taken: Message routed to:  Other: CARDIOLOGY    Travel Screening: Not Applicable       Thank you!  [facility]

## 2023-02-10 ENCOUNTER — ANCILLARY PROCEDURE (OUTPATIENT)
Dept: ULTRASOUND IMAGING | Facility: CLINIC | Age: 88
End: 2023-02-10
Attending: INTERNAL MEDICINE
Payer: MEDICARE

## 2023-02-10 ENCOUNTER — OFFICE VISIT (OUTPATIENT)
Dept: UROLOGY | Facility: CLINIC | Age: 88
End: 2023-02-10
Payer: MEDICARE

## 2023-02-10 VITALS — DIASTOLIC BLOOD PRESSURE: 71 MMHG | HEART RATE: 84 BPM | SYSTOLIC BLOOD PRESSURE: 136 MMHG

## 2023-02-10 DIAGNOSIS — C68.9 UROTHELIAL CANCER (H): Primary | ICD-10-CM

## 2023-02-10 DIAGNOSIS — E04.1 THYROID NODULE: ICD-10-CM

## 2023-02-10 DIAGNOSIS — C67.8 MALIGNANT NEOPLASM OF OVERLAPPING SITES OF BLADDER (H): ICD-10-CM

## 2023-02-10 DIAGNOSIS — C68.9 UROTHELIAL CARCINOMA (H): ICD-10-CM

## 2023-02-10 PROCEDURE — 76536 US EXAM OF HEAD AND NECK: CPT | Mod: GC | Performed by: RADIOLOGY

## 2023-02-10 PROCEDURE — 52000 CYSTOURETHROSCOPY: CPT | Performed by: UROLOGY

## 2023-02-10 RX ORDER — LIDOCAINE HYDROCHLORIDE 20 MG/ML
JELLY TOPICAL ONCE
Status: COMPLETED | OUTPATIENT
Start: 2023-02-10 | End: 2023-02-10

## 2023-02-10 RX ADMIN — LIDOCAINE HYDROCHLORIDE: 20 JELLY TOPICAL at 10:51

## 2023-02-10 NOTE — PATIENT INSTRUCTIONS
Please follow up in six months for a cystoscopy.     It was a pleasure meeting with you today.  Thank you for allowing me and my team the privilege of caring for you today.  YOU are the reason we are here, and I truly hope we provided you with the excellent service you deserve.  Please let us know if there is anything else we can do for you so that we can be sure you are leaving completely satisfied with your care experience.

## 2023-02-10 NOTE — LETTER
2/10/2023       RE: Dimple Oviedo  [address]  [facility] [id number]     Dear Colleague,    Thank you for referring your patient, Dimple Oviedo, to the [facility] [place] [facility]. Please see a copy of my visit note below.    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 She completed chemotherapy (gem/carbo).     -Continue q3-4 month CT Chest Abdomen and Pelvis with and without contrast with Dr Fine.     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   Got 6 of BCG and tolerated it well.   Negative biopsies 4/2020, 8/26/20.    Plan for 3 weekly maintenance BCG treatments at 3, 6, 12, 18, 24, 30, and 36 months.    6/2022 30 month BCG    Follow-up Protocol [facility] High Risk Bladder Cancer  Cystoscopy and Cytology should be done every:  3-4 months for 2 years  6 months for years 3-4  Annually thereafter  Consider upper tract imaging at 1-2 year intervals  2016 [facility] Diagnosis and Treatment of Non-Muscle Invasive Bladder Cancer: AUA/SUO Guideline      Plan  Plan for cystoscopy in 6 months.    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 The patient is following yolanda Toledo of Hematology and Oncology.      Per Dr. Toledo's note, on 12/12/18 Mr. Oviedo- Started adjuvant chemotherapy (carbo+gem) per POUT trial. Cycle 2 - 1/2/19. Cycle 3 - 1/23/19. Cycle 4 - 02/13/19.    Cystoscopy on 10/03/2019 showed small area of erythema. Bladder biopsy from 01/13/2020 showed urothelial carcinoma in-situ , negative biopsy 5/2020    Intravesical Therapy: BCG X6 Feb 2020 (Full  Strength)     She had right total knee arthoplasty 02/17/2020    Dr Fine is doing follow-up as well.    Today she is doing well.  She is here with her daughter.      CYSTOSCOPY PROCEDURE:  Sterile preparation and drape and an informed consent were performed.  A 16-Hebrew flexible cystoscope was introduced via the urethra.  The urethra was open.  The bladder mucosa showed no evidence of any lesions or trabeculation.  There were no stones.        Again, thank you for allowing me to participate in the care of your patient.      Sincerely,    Justin Henry MD

## 2023-02-10 NOTE — PROGRESS NOTES
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 She completed chemotherapy (gem/carbo).     -Continue q3-4 month CT Chest Abdomen and Pelvis with and without contrast with Dr Fine.     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   Got 6 of BCG and tolerated it well.   Negative biopsies 4/2020, 8/26/20.    Plan for 3 weekly maintenance BCG treatments at 3, 6, 12, 18, 24, 30, and 36 months.    6/2022 30 month BCG    Follow-up Protocol [facility] High Risk Bladder Cancer  Cystoscopy and Cytology should be done every:  3-4 months for 2 years  6 months for years 3-4  Annually thereafter  Consider upper tract imaging at 1-2 year intervals  2016 [facility] Diagnosis and Treatment of Non-Muscle Invasive Bladder Cancer: AUA/SUO Guideline      Plan  Plan for cystoscopy in 6 months.    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 The patient is following yolanda Toledo of Hematology and Oncology.      Per Dr. Toledo's note, on 12/12/18 Mr. Oviedo- Started adjuvant chemotherapy (carbo+gem) per POUT trial. Cycle 2 - 1/2/19. Cycle 3 - 1/23/19. Cycle 4 - 02/13/19.    Cystoscopy on 10/03/2019 showed small area of erythema. Bladder biopsy from 01/13/2020 showed urothelial carcinoma in-situ , negative biopsy 5/2020    Intravesical Therapy: BCG X6 Feb 2020 (Full Strength)     She had right total knee arthoplasty 02/17/2020    Dr Fine is doing follow-up as well.    Today she is doing well.  She is here with her daughter.      CYSTOSCOPY PROCEDURE:  Sterile preparation and drape and an informed consent were performed.  A 16-Telugu flexible cystoscope was introduced via the urethra.  The urethra  was open.  The bladder mucosa showed no evidence of any lesions or trabeculation.  There were no stones.

## 2023-02-10 NOTE — NURSING NOTE
Chief Complaint   Patient presents with     Cystoscopy       Blood pressure 136/71, pulse 84, not currently breastfeeding. There is no height or weight on file to calculate BMI.    Patient Active Problem List   Diagnosis     Esophageal reflux     Restless leg syndrome     heat intolerance     Goiter     Disorder of bone and cartilage     Other psoriasis     perirectal cyst     Malignant melanoma of skin of trunk, except scrotum (H)     Nontoxic multinodular goiter     Hyperlipidemia LDL goal <130     Hypertension goal BP (blood pressure) < 140/90     Urinary incontinence     Hip arthritis     Polymyalgia rheumatica (H)     High risk medication use     Shoulder pain     Impaired fasting blood sugar     Chronic bilateral low back pain without sciatica     Obesity, unspecified obesity severity, unspecified obesity type     Iron deficiency anemia, unspecified iron deficiency anemia type     NSTEMI (non-ST elevated myocardial infarction) (H)     Stress-induced cardiomyopathy     A-fib (H)     Anxiety     Chronic systolic heart failure (H)     Urothelial carcinoma (H)     Hyperthyroidism     Hydronephrosis     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)     Graves disease     Encounter for antineoplastic chemotherapy     Primary localized osteoarthritis of right knee     Urothelial cancer (H)     Malignant neoplasm of overlapping sites of bladder (H)     Primary osteoarthritis of right knee     Chronic kidney disease, stage 3 (H)     Lipedematous scalp     Atrial fibrillation with rapid ventricular response (H)     bmi over 35 with comorbidities      Aneurysm of renal artery (H)     Osteopenia, unspecified location     Asthma       Allergies   Allergen Reactions     Codeine        Current Outpatient Medications   Medication Sig Dispense Refill     alendronate (FOSAMAX) 70 MG tablet TAKE 1 TABLET EVERY 7 DAYS AT LEAST 60 MINUTES BEFORE BREAKFAST AS DIRECTED. 12 tablet 2     cefdinir (OMNICEF) 300 MG capsule Take 1  capsule (300 mg) by mouth 2 times daily 20 capsule 0     diltiazem ER (DILT-XR) 180 MG 24 hr capsule Take 1 capsule (180 mg) by mouth daily 90 capsule 3     ferrous sulfate 325 (65 Fe) MG TBEC EC tablet Take 325 mg by mouth every morning        furosemide (LASIX) 20 MG tablet TAKE 1 TABLET TWICE DAILY IN THE MORNING AND AFTER LUNCH 180 tablet 1     irbesartan (AVAPRO) 300 MG tablet TAKE 1 TABLET EVERY DAY 90 tablet 0     levofloxacin (LEVAQUIN) 500 MG tablet Take one tablet six hours after treatment and one tablet morning after treatment 6 tablet 11     lovastatin (MEVACOR) 40 MG tablet TAKE 1 TABLET AT BEDTIME 90 tablet 0     methimazole (TAPAZOLE) 5 MG tablet TAKE 1 TABLET ALTERNATING WITH 1/2 TABLET EVERY OTHER DAY 66 tablet 3     Omega-3 Fatty Acids (FISH OIL) 500 MG CAPS Take 1 capsule by mouth every morning        omeprazole (PRILOSEC) 20 MG DR capsule TAKE 1 CAPSULE EVERY DAY 90 capsule 0     propranolol ER (INDERAL LA) 60 MG 24 hr capsule TAKE 1 CAPSULE EVERY DAY 90 capsule 3     RESTASIS 0.05 % ophthalmic emulsion        rivaroxaban ANTICOAGULANT (XARELTO) 10 MG TABS tablet Take 1 tablet (10 mg) by mouth daily (with dinner) 30 tablet 0     rOPINIRole (REQUIP) 0.25 MG tablet TAKE 1 TABLET IN THE AFTERNOON AND TAKE 2 TABLETS EVERY NIGHT 270 tablet 0     rOPINIRole (REQUIP) 0.25 MG tablet TAKE 1 TABLET IN THE AFTERNOON AND TAKE 2 TABLETS EVERY NIGHT 270 tablet 0     sertraline (ZOLOFT) 100 MG tablet TAKE 1 TABLET EVERY MORNING 90 tablet 0     traZODone (DESYREL) 50 MG tablet TAKE 1/2 TABLET AT BEDTIME 45 tablet 3       Social History     Tobacco Use     Smoking status: Never     Smokeless tobacco: Never   Vaping Use     Vaping Use: Never used   Substance Use Topics     Alcohol use: No     Alcohol/week: 0.0 standard drinks     Comment: rare     Drug use: No       Invasive Procedure Safety Checklist:    Procedure: Cystoscopy    Action: Complete sections and checkboxes as appropriate.    Pre-procedure:  1.  Patient ID Verified with 2 identifiers (Bettie and  or MRN) : YES    2. Procedure and site verified with patient/designee (when able) : YES    3. Accurate consent documentation in medical record : YES    4. H&P (or appropriate assessment) documented in medical record : N/A  H&P must be up to 30 days prior to procedure an updated within 24 hours of                 Procedure as applicable.     5. Relevant diagnostic and radiology test results appropriately labeled and displayed as applicable : YES    6. Blood products, implants, devices, and/or special equipment available for the procedure as applicable : YES    7. Procedure site(s) marked with provider initials [Exclusions: none] : NO    8. Marking not required. Reason : Yes  Procedure does not require site marking    Time Out:     Time-Out performed immediately prior to starting procedure, including verbal and active participation of all team members addressing: YES    1. Correct patient identity.  2. Confirmed that the correct side and site are marked.  3. An accurate procedure to be done.  4. Agreement on the procedure to be done.  5. Correct patient position.  6. Relevant images and results are properly labeled and appropriately displayed.  7. The need to administer antibiotics or fluids for irrigation purposes during the procedure as applicable.  8. Safety precautions based on patient history or medication use.    During Procedure: Verification of correct person, site, and procedure occurs any time the responsibility for care of the patient is transferred to another member of the care team.    The following medication was given:     MEDICATION:  Lidocaine without epinephrine 2% jelly  ROUTE: urethral   SITE: urethral   DOSE: 10 mL  LOT #: [id number]  : [employer]  EXPIRATION DATE:   NDC#: [address]-1   Was there drug waste? No    Prior to med admin, verified patient identity using patient's name and date of birth.  Due  to med administration, patient instructed to remain in clinic for 15 minutes  afterwards, and to report any adverse reaction to me immediately.    Drug Amount Wasted:  None.  Vial/Syringe: Syringe      Taco Mabry EMT-P  2/10/2023  10:51 AM

## 2023-02-20 DIAGNOSIS — I87.303 STASIS EDEMA OF BOTH LOWER EXTREMITIES: ICD-10-CM

## 2023-02-23 RX ORDER — FUROSEMIDE 20 MG
TABLET ORAL
Qty: 180 TABLET | Refills: 0 | Status: SHIPPED | OUTPATIENT
Start: 2023-02-23 | End: 2023-03-15

## 2023-02-23 NOTE — TELEPHONE ENCOUNTER
Medication is being filled for 1 time refill only due to:  Patient needs labs creatinine.   Future appt 3/15/23  ITALO Mckinney RN  [facility]

## 2023-02-27 ENCOUNTER — HOSPITAL ENCOUNTER (EMERGENCY)
Facility: CLINIC | Age: 88
Discharge: HOME | End: 2023-02-27
Attending: EMERGENCY MEDICINE | Admitting: EMERGENCY MEDICINE
Payer: MEDICARE

## 2023-02-27 ENCOUNTER — APPOINTMENT (OUTPATIENT)
Dept: CT IMAGING | Facility: CLINIC | Age: 88
End: 2023-02-27
Attending: EMERGENCY MEDICINE
Payer: MEDICARE

## 2023-02-27 VITALS
WEIGHT: 170 LBS | OXYGEN SATURATION: 96 % | DIASTOLIC BLOOD PRESSURE: 74 MMHG | RESPIRATION RATE: 14 BRPM | HEIGHT: 57 IN | TEMPERATURE: 98.3 F | HEART RATE: 78 BPM | BODY MASS INDEX: 36.68 KG/M2 | SYSTOLIC BLOOD PRESSURE: 122 MMHG

## 2023-02-27 DIAGNOSIS — R10.9 RIGHT FLANK PAIN: ICD-10-CM

## 2023-02-27 DIAGNOSIS — R82.90 CLOUDY URINE: ICD-10-CM

## 2023-02-27 DIAGNOSIS — N39.0 ACUTE UTI: ICD-10-CM

## 2023-02-27 LAB
ALBUMIN SERPL BCG-MCNC: 3.9 G/DL (ref 3.5–5.2)
ALBUMIN UR-MCNC: NEGATIVE MG/DL
ALP SERPL-CCNC: 92 U/L (ref 35–104)
ALT SERPL W P-5'-P-CCNC: 19 U/L (ref 10–35)
ANION GAP SERPL CALCULATED.3IONS-SCNC: 11 MMOL/L (ref 7–15)
APPEARANCE UR: ABNORMAL
AST SERPL W P-5'-P-CCNC: ABNORMAL U/L
BASOPHILS # BLD AUTO: 0.1 10E3/UL (ref 0–0.2)
BASOPHILS NFR BLD AUTO: 1 %
BILIRUB SERPL-MCNC: 0.4 MG/DL
BILIRUB UR QL STRIP: NEGATIVE
BUN SERPL-MCNC: 18.5 MG/DL (ref 8–23)
CALCIUM SERPL-MCNC: 9.8 MG/DL (ref 8.8–10.2)
CHLORIDE SERPL-SCNC: 100 MMOL/L (ref 98–107)
COLOR UR AUTO: ABNORMAL
CREAT SERPL-MCNC: 0.96 MG/DL (ref 0.51–0.95)
DEPRECATED HCO3 PLAS-SCNC: 27 MMOL/L (ref 22–29)
EOSINOPHIL # BLD AUTO: 0.1 10E3/UL (ref 0–0.7)
EOSINOPHIL NFR BLD AUTO: 1 %
ERYTHROCYTE [DISTWIDTH] IN BLOOD BY AUTOMATED COUNT: 13.3 % (ref 10–15)
GFR SERPL CREATININE-BSD FRML MDRD: 56 ML/MIN/1.73M2
GLUCOSE SERPL-MCNC: 102 MG/DL (ref 70–99)
GLUCOSE UR STRIP-MCNC: NEGATIVE MG/DL
HCT VFR BLD AUTO: 40.5 % (ref 35–47)
HGB BLD-MCNC: 12.8 G/DL (ref 11.7–15.7)
HGB UR QL STRIP: ABNORMAL
IMM GRANULOCYTES # BLD: 0 10E3/UL
IMM GRANULOCYTES NFR BLD: 1 %
KETONES UR STRIP-MCNC: NEGATIVE MG/DL
LEUKOCYTE ESTERASE UR QL STRIP: ABNORMAL
LYMPHOCYTES # BLD AUTO: 1.4 10E3/UL (ref 0.8–5.3)
LYMPHOCYTES NFR BLD AUTO: 17 %
MCH RBC QN AUTO: 30.7 PG (ref 26.5–33)
MCHC RBC AUTO-ENTMCNC: 31.6 G/DL (ref 31.5–36.5)
MCV RBC AUTO: 97 FL (ref 78–100)
MONOCYTES # BLD AUTO: 0.6 10E3/UL (ref 0–1.3)
MONOCYTES NFR BLD AUTO: 7 %
NEUTROPHILS # BLD AUTO: 6.2 10E3/UL (ref 1.6–8.3)
NEUTROPHILS NFR BLD AUTO: 73 %
NITRATE UR QL: NEGATIVE
NRBC # BLD AUTO: 0 10E3/UL
NRBC BLD AUTO-RTO: 0 /100
PH UR STRIP: 5.5 [PH] (ref 5–7)
PLATELET # BLD AUTO: 192 10E3/UL (ref 150–450)
POTASSIUM SERPL-SCNC: 4.8 MMOL/L (ref 3.4–5.3)
PROT SERPL-MCNC: 6.9 G/DL (ref 6.4–8.3)
RBC # BLD AUTO: 4.17 10E6/UL (ref 3.8–5.2)
RBC URINE: 2 /HPF
SODIUM SERPL-SCNC: 138 MMOL/L (ref 136–145)
SP GR UR STRIP: 1 (ref 1–1.03)
UROBILINOGEN UR STRIP-MCNC: NORMAL MG/DL
WBC # BLD AUTO: 8.4 10E3/UL (ref 4–11)
WBC URINE: 112 /HPF

## 2023-02-27 PROCEDURE — 36415 COLL VENOUS BLD VENIPUNCTURE: CPT | Performed by: EMERGENCY MEDICINE

## 2023-02-27 PROCEDURE — G1010 CDSM STANSON: HCPCS

## 2023-02-27 PROCEDURE — 74176 CT ABD & PELVIS W/O CONTRAST: CPT | Mod: 26 | Performed by: STUDENT IN AN ORGANIZED HEALTH CARE EDUCATION/TRAINING PROGRAM

## 2023-02-27 PROCEDURE — 87086 URINE CULTURE/COLONY COUNT: CPT | Performed by: EMERGENCY MEDICINE

## 2023-02-27 PROCEDURE — 99284 EMERGENCY DEPT VISIT MOD MDM: CPT | Performed by: EMERGENCY MEDICINE

## 2023-02-27 PROCEDURE — 84155 ASSAY OF PROTEIN SERUM: CPT | Performed by: EMERGENCY MEDICINE

## 2023-02-27 PROCEDURE — 81001 URINALYSIS AUTO W/SCOPE: CPT | Performed by: EMERGENCY MEDICINE

## 2023-02-27 PROCEDURE — 85025 COMPLETE CBC W/AUTO DIFF WBC: CPT | Performed by: EMERGENCY MEDICINE

## 2023-02-27 PROCEDURE — 99284 EMERGENCY DEPT VISIT MOD MDM: CPT | Mod: 25 | Performed by: EMERGENCY MEDICINE

## 2023-02-27 PROCEDURE — G1010 CDSM STANSON: HCPCS | Mod: GC | Performed by: STUDENT IN AN ORGANIZED HEALTH CARE EDUCATION/TRAINING PROGRAM

## 2023-02-27 RX ORDER — CEPHALEXIN 500 MG/1
500 CAPSULE ORAL 2 TIMES DAILY
Qty: 14 CAPSULE | Refills: 0 | Status: SHIPPED | OUTPATIENT
Start: 2023-02-27 | End: 2023-03-06

## 2023-02-27 ASSESSMENT — ACTIVITIES OF DAILY LIVING (ADL): ADLS_ACUITY_SCORE: 35

## 2023-02-27 NOTE — DISCHARGE INSTRUCTIONS
TODAY'S VISIT:  You were seen today for cloudy urine   -   - If you had any labs or imaging/radiology tests performed today, you should also discuss these tests with your usual provider.     FOLLOW-UP:  Please make an appointment to follow up with:  - Your Primary Care Provider. If you do not have a PCP, please call the Primary Care Center (phone: [phone] for an appointment    - Have your provider review the results from today's visit with you again to make sure no further follow-up or additional testing is needed based on those results.     RETURN TO THE EMERGENCY DEPARTMENT  Return to the Emergency Department at any time for any new or worsening symptoms or any concerns.

## 2023-02-27 NOTE — ED TRIAGE NOTES
States she has a bladder infection. In the past had a kidney stone and wondering if this could be part of the problem as well. Urine is cloudy denies dysuria. Pt has one kidney related to cancer.     Triage Assessment     Row Name 02/27/23 1155       Triage Assessment (Adult)    Airway WDL WDL       Respiratory WDL    Respiratory WDL WDL       Skin Circulation/Temperature WDL    Skin Circulation/Temperature WDL WDL       Cardiac WDL    Cardiac WDL WDL       Peripheral/Neurovascular WDL    Peripheral Neurovascular WDL WDL       Cognitive/Neuro/Behavioral WDL    Cognitive/Neuro/Behavioral WDL WDL

## 2023-02-27 NOTE — ED PROVIDER NOTES
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  Symptoms are similar to prior UTIs. No fevers. Yesterday, she had right flank pain radiating to her leg, none now. No abdominal pain. Pain yesterday resolved with tynol and a heating pad.     Culture 1/18/23 10,000-50,000 CFU/mL Escherichia coli Abnormal             Resulting Agency: [facility]     Susceptibility     Escherichia coli     MALIA     Ampicillin Susceptible     Ampicillin/ Sulbactam Susceptible     Cefazolin Susceptible 1     Cefepime Susceptible     Cefoxitin Susceptible     Ceftazidime Susceptible     Ceftriaxone Susceptible     Ciprofloxacin Susceptible     Gentamicin Susceptible     Levofloxacin Susceptible     Nitrofurantoin Susceptible     Piperacillin/Tazobactam Susceptible     Tobramycin Susceptible     Trimethoprim/Sulfamethoxazole Susceptible                   Past Medical History  Past Medical History:   Diagnosis Date     CRESENCIO (acute kidney injury) (H) 9/11/2018     Arthritis      Asthma 2/9/2022     Calculus of kidney      Chemotherapy-induced neutropenia (H) 3/6/2019     Congestive heart failure (H)      Esophageal reflux      GERD (gastroesophageal reflux disease)      Hyperlipidemia LDL goal <130 5/9/2010     Malignant melanoma of skin of trunk, except scrotum (H)      Nonspecific abnormal finding     has living will 2004 -      Nontoxic multinodular goiter     no further eval /tx rec per pt     Osteopenia      Other psoriasis      Personal history of colonic polyps       PMR (polymyalgia rheumatica) (H)      Restless legs syndrome 7/11/2018     Stress-induced cardiomyopathy      Undiagnosed cardiac murmurs      Unspecified constipation      Unspecified essential hypertension      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     Past Surgical History:   Procedure Laterality Date     ARTHROPLASTY KNEE Right 11/9/2020    Procedure: ARTHROPLASTY, KNEE, TOTAL, RIGHT;  Surgeon: Edward Otero MD;  [facility]     BIOPSY       COLONOSCOPY  2014     COMBINED CYSTOSCOPY, INSERT STENT URETER(S) Bilateral 9/12/2018    Procedure: COMBINED CYSTOSCOPY, INSERT STENT URETER(S);  Cystoscopy Bilateral ureteral Stent Placement.;  Surgeon: Justin Henry MD;  [facility]     COMBINED CYSTOSCOPY, RETROGRADES, URETEROSCOPY, INSERT STENT Bilateral 4/3/2018    Procedure: COMBINED CYSTOSCOPY, RETROGRADES, URETEROSCOPY, INSERT STENT;;  Surgeon: Stuart King MD;  [facility]     COMBINED CYSTOSCOPY, RETROGRADES, URETEROSCOPY, INSERT STENT Bilateral 9/10/2018    Procedure: COMBINED CYSTOSCOPY, RETROGRADES, URETEROSCOPY, INSERT STENT;  Cystoscopy, Bilateral Ureteroscopy, Bladder Biopsies, Retrogram Pyelograms, Ureteral Washings and brushings, cysview;  Surgeon: Stuart King MD;  [facility]     COMBINED CYSTOSCOPY, RETROGRADES, URETEROSCOPY, INSERT STENT Left 8/26/2020    Procedure: Cystoscopy, Left Ureteral Wash, Left ureteral Retrograde Pyelogram;  Surgeon: Justin Henry MD;  [facility]     CYSTOSCOPY, BIOPSY BLADDER INSTILL OPTICAL AGENT N/A 4/3/2018    Procedure: CYSTOSCOPY, BIOPSY BLADDER INSTILL OPTICAL AGENT;  Cystoscopy, Blue Light Cystoscopy, Bladder Biopsies, Bilateral Selective ureteral washings for Cytology, Bilateral Retrograde Pyelograms, Bilateral Ureteroscopy;  Surgeon: Stuart King MD;  [facility]     CYSTOSCOPY, BIOPSY BLADDER, COMBINED N/A 2/19/2018    Procedure: COMBINED CYSTOSCOPY, BIOPSY BLADDER;  Cystoscopy,  Bladder Biopsy;  Surgeon: Kenna La MD;  [facility]     CYSTOSCOPY, BIOPSY BLADDER, COMBINED N/A 8/26/2020    Procedure: Cystoscopy, biopsy bladder, combined;  Surgeon: Justin Henry MD;  [facility]     CYSTOSCOPY, FULGURATE BLADDER TUMOR, COMBINED N/A 1/13/2020    Procedure: CYSTOSCOPY, WITH  bladder biopsies and fulguration;  Surgeon: Stuart King MD;  [facility]     CYSTOSCOPY, REMOVE STENT(S), COMBINED  11/13/2018    Procedure: Flexible Cystoscopy with Stent Removal;  Surgeon: Stuart King MD;  [facility]     DAVINCI LYMPHADENECTOMY N/A 11/13/2018    Procedure: Davinci Lymphadenectomy ;  Surgeon: Stuart King MD;  [facility]     DAVINCI NEPHROURETERECTOMY N/A 11/13/2018    Procedure: Right DaVinci Assisted Nephroureterectomy;  Surgeon: Stuart King MD;  [facility]     ENDOSCOPIC ULTRASOUND LOWER GASTROINTESTIONAL TRACT (GI) N/A 10/30/2015    Procedure: ENDOSCOPIC ULTRASOUND LOWER GASTROINTESTIONAL TRACT (GI);  Surgeon: Daniel Jean-Baptiste MD;  [facility]     EYE SURGERY  12/4/17     INSERT PORT VASCULAR ACCESS Right 12/19/2018    Procedure: Chest Port Placement - right;  Surgeon: Stuart Chavez PA-C;  [facility]     IR CHEST PORT PLACEMENT > 5 YRS OF AGE  12/19/2018     IR PORT REMOVAL RIGHT  6/26/2019     LAPAROSCOPIC CHOLECYSTECTOMY WITH CHOLANGIOGRAMS N/A 11/1/2015    Procedure: LAPAROSCOPIC CHOLECYSTECTOMY WITH CHOLANGIOGRAMS;  Surgeon: Tonie Warren MD;  [facility]     REMOVE PORT VASCULAR ACCESS Right 6/26/2019    Procedure: Right Port Removal;  Surgeon: Froilan Irizarry PA-C;  [facility]     SURGICAL HISTORY OF -   1996    malignant melanoma     SURGICAL HISTORY OF -   1968    thyroid nodule     SURGICAL HISTORY OF -       D & C     ZZC NONSPECIFIC PROCEDURE  2005    colonoscopy polyp repeat 2010     alendronate (FOSAMAX) 70 MG tablet  cefdinir (OMNICEF) 300  "MG capsule  diltiazem ER (DILT-XR) 180 MG 24 hr capsule  ferrous sulfate 325 (65 Fe) MG TBEC EC tablet  furosemide (LASIX) 20 MG tablet  irbesartan (AVAPRO) 300 MG tablet  levofloxacin (LEVAQUIN) 500 MG tablet  lovastatin (MEVACOR) 40 MG tablet  methimazole (TAPAZOLE) 5 MG tablet  Omega-3 Fatty Acids (FISH OIL) 500 MG CAPS  omeprazole (PRILOSEC) 20 MG DR capsule  propranolol ER (INDERAL LA) 60 MG 24 hr capsule  RESTASIS 0.05 % ophthalmic emulsion  rivaroxaban ANTICOAGULANT (XARELTO) 10 MG TABS tablet  rOPINIRole (REQUIP) 0.25 MG tablet  rOPINIRole (REQUIP) 0.25 MG tablet  sertraline (ZOLOFT) 100 MG tablet  traZODone (DESYREL) 50 MG tablet      Allergies   Allergen Reactions     Codeine      Family History  Family History   Problem Relation Age of Onset     Cancer Father         dec - esophageal and laryngeal     Heart Disease Mother      Respiratory Mother         dec     Breast Cancer Daughter      Other Cancer Daughter      Thyroid Disease Daughter      Asthma Daughter      Hyperlipidemia Son      Diabetes Son      Anesthesia Reaction No family hx of      Deep Vein Thrombosis (DVT) No family hx of      Social History   Social History     Tobacco Use     Smoking status: Never     Smokeless tobacco: Never   Vaping Use     Vaping Use: Never used   Substance Use Topics     Alcohol use: No     Alcohol/week: 0.0 standard drinks     Comment: rare     Drug use: No      Past medical history, past surgical history, medications, allergies, family history, and social history were reviewed with the patient. No additional pertinent items.      A medically appropriate review of systems was performed with pertinent positives and negatives noted in the HPI, and all other systems negative.    Physical Exam   BP: 122/74  Pulse: 78  Temp: 98.3  F (36.8  C)  Resp: 14  Height: 144.8 cm (4' 9\")  Weight: 77.1 kg (170 lb)  SpO2: 96 %  Physical Exam  General: Well nourished, well developed, NAD  HEENT: EOMI, anicteric. NCAT, MMM  Neck: no " jugular venous distension, supple, nl ROM  Cardiac: Regular rate, extremities well perfused  Pulm: NLB, nl RR  Abd: Soft, nontender, nondistended.  No masses palpated.  No CVA TTP  Skin: Warm and dry to the touch.  No rash  Extremities: No LE edema, no cyanosis, w/w/p  Neuro: A&Ox3, no gross focal deficits        ED Course, Procedures, & Data      Procedures                     Results for orders placed or performed during the hospital encounter of 02/27/23   Abd/pelvis CT no contrast - Stone Protocol     Status: None (Preliminary result)    Impression    RESIDENT PRELIMINARY INTERPRETATION  IMPRESSION:   1. No evidence of hydronephrosis or urinary obstruction.  Nonobstructing left nephrolithiasis measuring up to 4 mm.   2. Small focus of gas in urinary bladder, may represent infection or  recent catheterization. Correlation with urinalysis recommended.  3. Surgical changes of right nephrectomy.  4. 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   UA with Microscopic reflex to Culture     Status: Abnormal    Specimen: Urine, Midstream   Result Value Ref Range    Color Urine Straw Colorless, Straw, Light Yellow, Yellow    Appearance Urine Slightly Cloudy (A) Clear    Glucose Urine Negative Negative mg/dL    Bilirubin Urine Negative Negative    Ketones Urine Negative Negative mg/dL    Specific Gravity Urine 1.004 1.003 - 1.035    Blood Urine Trace (A) Negative    pH Urine 5.5 5.0 - 7.0    Protein Albumin Urine Negative Negative mg/dL    Urobilinogen Urine Normal Normal, 2.0 mg/dL    Nitrite Urine Negative Negative    Leukocyte Esterase Urine Large (A) Negative    RBC Urine 2 <=2 /HPF    WBC Urine 112 (H) <=5 /HPF    Narrative    Urine Culture ordered based on laboratory criteria   CBC with platelets and differential     Status: None   Result Value Ref Range    WBC Count 8.4 4.0 - 11.0 10e3/uL    RBC Count 4.17 3.80 - 5.20 10e6/uL    Hemoglobin 12.8 11.7 - 15.7 g/dL    Hematocrit 40.5 35.0 - 47.0 %    MCV 97 78 - 100 fL     MCH 30.7 26.5 - 33.0 pg    MCHC 31.6 31.5 - 36.5 g/dL    RDW 13.3 10.0 - 15.0 %    Platelet Count 192 150 - 450 10e3/uL    % Neutrophils 73 %    % Lymphocytes 17 %    % Monocytes 7 %    % Eosinophils 1 %    % Basophils 1 %    % Immature Granulocytes 1 %    NRBCs per 100 WBC 0 <1 /100    Absolute Neutrophils 6.2 1.6 - 8.3 10e3/uL    Absolute Lymphocytes 1.4 0.8 - 5.3 10e3/uL    Absolute Monocytes 0.6 0.0 - 1.3 10e3/uL    Absolute Eosinophils 0.1 0.0 - 0.7 10e3/uL    Absolute Basophils 0.1 0.0 - 0.2 10e3/uL    Absolute Immature Granulocytes 0.0 <=0.4 10e3/uL    Absolute NRBCs 0.0 10e3/uL   CBC with platelets differential     Status: None    Narrative    The following orders were created for panel order CBC with platelets differential.  Procedure                               Abnormality         Status                     ---------                               -----------         ------                     CBC with platelets and d...[[id number]]                      Final result                 Please view results for these tests on the individual orders.     Medications - No data to display  Labs Ordered and Resulted from Time of ED Arrival to Time of ED Departure   ROUTINE UA WITH MICROSCOPIC REFLEX TO CULTURE - Abnormal       Result Value    Color Urine Straw      Appearance Urine Slightly Cloudy (*)     Glucose Urine Negative      Bilirubin Urine Negative      Ketones Urine Negative      Specific Gravity Urine 1.004      Blood Urine Trace (*)     pH Urine 5.5      Protein Albumin Urine Negative      Urobilinogen Urine Normal      Nitrite Urine Negative      Leukocyte Esterase Urine Large (*)     RBC Urine 2      WBC Urine 112 (*)    CBC WITH PLATELETS AND DIFFERENTIAL    WBC Count 8.4      RBC Count 4.17      Hemoglobin 12.8      Hematocrit 40.5      MCV 97      MCH 30.7      MCHC 31.6      RDW 13.3      Platelet Count 192      % Neutrophils 73      % Lymphocytes 17      % Monocytes 7      % Eosinophils 1      %  Basophils 1      % Immature Granulocytes 1      NRBCs per 100 WBC 0      Absolute Neutrophils 6.2      Absolute Lymphocytes 1.4      Absolute Monocytes 0.6      Absolute Eosinophils 0.1      Absolute Basophils 0.1      Absolute Immature Granulocytes 0.0      Absolute NRBCs 0.0     COMPREHENSIVE METABOLIC PANEL   URINE CULTURE     Abd/pelvis CT no contrast - Stone Protocol   Preliminary Result   RESIDENT PRELIMINARY INTERPRETATION   IMPRESSION:    1. No evidence of hydronephrosis or urinary obstruction.   Nonobstructing left nephrolithiasis measuring up to 4 mm.    2. Small focus of gas in urinary bladder, may represent infection or   recent catheterization. Correlation with urinalysis recommended.   3. Surgical changes of right nephrectomy.   4. 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                 Assessment & Plan    The patient presents the emergency department complaining of flank pain.  Differential diagnosis includes nephrolithiasis, UTI, musculoskeletal flank pain, gastritis/gastroenteritis.  Labs, urinalysis, CT of the abdomen pelvis ordered to further evaluate the patient.  IV fluids, Toradol, and Zofran were ordered for symptomatic relief in the emergency department.    Laboratory work-up was remarkable for normal WBC count.  Urinalysis does appear c/w UTI.    CT of the abdomen and pelvis shows no ureteral stone.    I have reviewed the nursing notes.    I have reviewed the findings, diagnosis, plan and need for follow up with the patient.    Patient to be discharged home. Advised to follow up with PCP.  Advised to stay well-hydrated.  Prescription for antibiotics given. To return to ER immediately with any new/worsening symptoms. Plan of care discussed with patient who expresses understanding and agrees with plan of care.    New Prescriptions    CEPHALEXIN (KEFLEX) 500 MG CAPSULE    Take 1 capsule (500 mg) by mouth 2 times daily for 7 days       Final diagnoses:   Right flank pain   Cloudy urine    Acute UTI       Lian Braga MD  [facility] EMERGENCY DEPARTMENT  2/27/2023     Lian Braga MD  02/27/23 [id number]

## 2023-02-28 DIAGNOSIS — E78.5 HYPERLIPIDEMIA LDL GOAL <130: ICD-10-CM

## 2023-02-28 LAB — BACTERIA UR CULT: ABNORMAL

## 2023-03-02 RX ORDER — LOVASTATIN 40 MG
TABLET ORAL
Qty: 90 TABLET | Refills: 0 | Status: SHIPPED | OUTPATIENT
Start: 2023-03-02 | End: 2023-10-17

## 2023-03-02 NOTE — TELEPHONE ENCOUNTER
"Popping up high drug- drug interaction between diltiazem and lovastatin.    Has a visit scheduled with you on 3/15/2023.    Requested Prescriptions   Pending Prescriptions Disp Refills     lovastatin (MEVACOR) 40 MG tablet [Pharmacy Med Name: LOVASTATIN 40 MG Tablet] 90 tablet 0     Sig: TAKE 1 TABLET AT BEDTIME (SCHEDULE ANNUAL VISIT)       Statins Protocol Passed - 2/28/2023 10:26 AM        Passed - LDL on file in past 12 months     Recent Labs   Lab Test 12/09/22  1000   LDL 80             Passed - No abnormal creatine kinase in past 12 months     No lab results found.             Passed - Recent (12 mo) or future (30 days) visit within the authorizing provider's specialty     Patient has had an office visit with the authorizing provider or a provider within the authorizing providers department within the previous 12 mos or has a future within next 30 days. See \"Patient Info\" tab in inbasket, or \"Choose Columns\" in Meds & Orders section of the refill encounter.              Passed - Medication is active on med list        Passed - Patient is age 18 or older        Passed - No active pregnancy on record        Passed - No positive pregnancy test in past 12 months             Thank you    "

## 2023-03-08 ENCOUNTER — TELEPHONE (OUTPATIENT)
Dept: FAMILY MEDICINE | Facility: CLINIC | Age: 88
End: 2023-03-08
Payer: MEDICARE

## 2023-03-08 DIAGNOSIS — R30.0 DYSURIA: Primary | ICD-10-CM

## 2023-03-08 NOTE — TELEPHONE ENCOUNTER
Dr. Zapien --  Please review and advise.     S-(situation): Patient went to the ER on 2/27. Acute UTI.    B-(background): Placed on cephalexin for 7 days 500 mg BID. Finished medication on Monday.     A-(assessment): Last night urine is cloudy again. Patient states that in the past she has always taken for 10 days.    Do you want the patient to come in for a UA?    [place]ia Kent, SHASHANKN ATUL  [facility]

## 2023-03-08 NOTE — TELEPHONE ENCOUNTER
Reviewed this provider message with pt.  She says the urine can be foggy.    Again reviewed-  7 days should be appropriate. Push fluids and color of urine itself is not indicator or UTI. If having burning, pain, blood in urine or other symptoms - follow up.     PT agreed with POC.  ATUL Lee

## 2023-03-08 NOTE — TELEPHONE ENCOUNTER
7 days should be appropriate. Push fluids and color of urine itself is not indicator or UTI. If having burning, pain, blood in urine or other symptoms - follow up.

## 2023-03-09 ENCOUNTER — OFFICE VISIT (OUTPATIENT)
Dept: URGENT CARE | Facility: URGENT CARE | Age: 88
End: 2023-03-09
Payer: MEDICARE

## 2023-03-09 VITALS
BODY MASS INDEX: 36.68 KG/M2 | DIASTOLIC BLOOD PRESSURE: 79 MMHG | RESPIRATION RATE: 16 BRPM | HEIGHT: 57 IN | SYSTOLIC BLOOD PRESSURE: 135 MMHG | TEMPERATURE: 99 F | HEART RATE: 71 BPM | WEIGHT: 170 LBS | OXYGEN SATURATION: 92 %

## 2023-03-09 DIAGNOSIS — N30.00 ACUTE CYSTITIS WITHOUT HEMATURIA: Primary | ICD-10-CM

## 2023-03-09 DIAGNOSIS — R30.0 DYSURIA: ICD-10-CM

## 2023-03-09 LAB
ALBUMIN UR-MCNC: NEGATIVE MG/DL
APPEARANCE UR: ABNORMAL
BILIRUB UR QL STRIP: NEGATIVE
COLOR UR AUTO: YELLOW
GLUCOSE UR STRIP-MCNC: NEGATIVE MG/DL
HGB UR QL STRIP: ABNORMAL
KETONES UR STRIP-MCNC: NEGATIVE MG/DL
LEUKOCYTE ESTERASE UR QL STRIP: ABNORMAL
NITRATE UR QL: NEGATIVE
PH UR STRIP: 6 [PH] (ref 5–7)
RBC #/AREA URNS AUTO: ABNORMAL /HPF
SP GR UR STRIP: <=1.005 (ref 1–1.03)
TRANS CELLS #/AREA URNS HPF: ABNORMAL /HPF
UROBILINOGEN UR STRIP-ACNC: 0.2 E.U./DL
WBC #/AREA URNS AUTO: ABNORMAL /HPF

## 2023-03-09 PROCEDURE — 87186 SC STD MICRODIL/AGAR DIL: CPT | Performed by: FAMILY MEDICINE

## 2023-03-09 PROCEDURE — 81001 URINALYSIS AUTO W/SCOPE: CPT | Performed by: FAMILY MEDICINE

## 2023-03-09 PROCEDURE — 99213 OFFICE O/P EST LOW 20 MIN: CPT | Performed by: FAMILY MEDICINE

## 2023-03-09 PROCEDURE — 87086 URINE CULTURE/COLONY COUNT: CPT | Performed by: FAMILY MEDICINE

## 2023-03-09 RX ORDER — CEFDINIR 300 MG/1
300 CAPSULE ORAL 2 TIMES DAILY
Qty: 10 CAPSULE | Refills: 0 | Status: SHIPPED | OUTPATIENT
Start: 2023-03-09 | End: 2023-03-14

## 2023-03-09 ASSESSMENT — PAIN SCALES - GENERAL: PAINLEVEL: NO PAIN (0)

## 2023-03-09 NOTE — TELEPHONE ENCOUNTER
She has hx of urothelial cancer. She needs to contact her urologist office to let them know symptoms and to see if they have recommendations for her, thank you!    Sincerely,  Dr. Mary Denise MD  3/9/2023    Last note from Urologist 2/10/23:  Assessment and Plan:  1.High-grade, muscle-invasive, transitional cell carcinoma of right renal pelvis, stage IV ([id number]): Right nephroureterectomy on 11/13/2018. She completed chemotherapy (gem/carbo).      -Continue q3-4 month CT Chest Abdomen and Pelvis with and without contrast with Dr Fine.     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   Got 6 of BCG and tolerated it well.   Negative biopsies 4/2020, 8/26/20.     Plan for 3 weekly maintenance BCG treatments at 3, 6, 12, 18, 24, 30, and 36 months.     6/2022 30 month BCG

## 2023-03-09 NOTE — TELEPHONE ENCOUNTER
"Pt calling again this morning with no improvement.  Urine is cloudier this morning.  No pain or hematuria.  \"I know my body, and would like to come in [for UA] before the snow storm.\"    Routing to WILLIAM.    ITALO Mckinney RN  [facility]      "

## 2023-03-09 NOTE — TELEPHONE ENCOUNTER
"I called patient with response from Dr Denise and she was not pleased.  States she won't be able to get in with urologist for 6 weeks and she knows what it is.  States when the urine is cloudy in the toilet she knows it is an infection. \"So my doctor doesn't want to deal with it?\"  I explained provider is out of office until Monday, 3/13/23 and another provider reviewed her call and her chart and responded.  Offered an Urgent Care appointment and patient declined even after I let her know that it is open from 10 AM to 8 PM.  States \"OK, I'll just sit here and fiddle with it then\" and hung up on me.  Raina Al RN  [facility]    "

## 2023-03-10 NOTE — PATIENT INSTRUCTIONS
Take medication Cefdinir twice a day for 5 days    Symptoms should improve within the next 2-3 days. If no improvement, call clinic to discuss or return for re-evaluation    Drink approximately 60 ounces of water a day to stay hydrated.     If you develop flank pain, fevers, nausea/vomiting call immediately for assistance

## 2023-03-10 NOTE — PROGRESS NOTES
Assessment & Plan     Dysuria  - UA macro with reflex to Microscopic and Culture - Clinc Collect  - UA Microscopic with Reflex to Culture  - Urine Culture    Acute cystitis without hematuria  - cefdinir (OMNICEF) 300 MG capsule  Dispense: 10 capsule; Refill: 0     Recent urine cultures reviewed over the past 6 months - no resistant organisms detected. Will move to cefdinir (recently on cephalexin) for a 5 day course - urine culture pending. No symptoms of pyelonephritis.     Wesley Vaughan MD   [facility]    Davey Musa is a 89 year old female who presents to clinic today for the following health issues:  Chief Complaint   Patient presents with     Urgent Care     Patient states she has a history of bladder cancer  4.5 years (5 yrs in June) follow up in June.   Patient states she  has cloudy urine and urgency x 4 days. Patient states she just finished an antibiotic last week from February 27,2023.  Patient is her with a friend today.                                                 HPI    Recently took cephalexin, symptoms improved but then returned. Denies fevers/flank discomfort/vomiting/nausea. Has been trying to improve her intake of water.  Accompanied by her neighbor.    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     Priority: Medium     Reviewed CT scans, has been stable and unchanged since 2019  Last scan in 2021       Atrial fibrillation with rapid ventricular response (H) 09/01/2021     Priority: Medium     Chronic kidney disease, stage 3 (H) 02/23/2021     Priority: Medium     Primary osteoarthritis of right knee 10/09/2020     Priority: Medium     Added automatically from request for surgery [id number]       Malignant neoplasm of overlapping sites of  bladder (H) 08/19/2020     Priority: Medium     Added automatically from request for surgery [id number]       Urothelial cancer (H) 12/17/2019     Priority: Medium     Added automatically from request for surgery [id number]       Primary localized osteoarthritis of right knee 09/25/2019     Priority: Medium     Added automatically from request for surgery [id number]       Encounter for antineoplastic chemotherapy 01/09/2019     Priority: Medium     Graves disease 12/04/2018     Priority: Medium     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     Priority: Medium     Hydronephrosis 09/11/2018     Priority: Medium     Hyperthyroidism 06/28/2018     Priority: Medium     Lipedematous scalp 06/01/2018     Priority: Medium     Urothelial carcinoma (H) 03/22/2018     Priority: Medium     Chronic systolic heart failure (H) 02/20/2018     Priority: Medium     Anxiety 02/07/2018     Priority: Medium     A-fib (H) 01/16/2018     Priority: Medium     Stress-induced cardiomyopathy 12/14/2017     Priority: Medium     NSTEMI (non-ST elevated myocardial infarction) (H) 12/13/2017     Priority: Medium     Iron deficiency anemia, unspecified iron deficiency anemia type 11/30/2017     Priority: Medium     Obesity, unspecified obesity severity, unspecified obesity type 02/22/2017     Priority: Medium     Chronic bilateral low back pain without sciatica 12/05/2016     Priority: Medium     Impaired fasting blood sugar 11/24/2015     Priority: Medium     Shoulder pain 10/31/2014     Priority: Medium     High risk medication use 10/15/2014     Priority: Medium     Polymyalgia rheumatica (H) 07/10/2014     Priority: Medium     Hip arthritis 06/23/2014     Priority: Medium     Hypertension goal BP (blood pressure) < 140/90 11/30/2011     Priority: Medium     Urinary incontinence 11/30/2011     Priority: Medium     Hyperlipidemia LDL goal <130 05/09/2010     Priority: Medium     Nontoxic multinodular goiter      Priority: Medium      "Malignant melanoma of skin of trunk, except scrotum (H)      Priority: Medium     perirectal cyst 12/16/2005     Priority: Medium     Restless leg syndrome 10/12/2005     Priority: Medium     heat intolerance 10/12/2005     Priority: Medium     Goiter 10/12/2005     Priority: Medium     Problem list name updated by automated process. Provider to review       Disorder of bone and cartilage 10/12/2005     Priority: Medium     Problem list name updated by automated process. Provider to review       Other psoriasis 10/12/2005     Priority: Medium     Esophageal reflux 09/22/2004     Priority: Medium       Current Outpatient Medications   Medication     cefdinir (OMNICEF) 300 MG capsule     alendronate (FOSAMAX) 70 MG tablet     cefdinir (OMNICEF) 300 MG capsule     diltiazem ER (DILT-XR) 180 MG 24 hr capsule     ferrous sulfate 325 (65 Fe) MG TBEC EC tablet     furosemide (LASIX) 20 MG tablet     irbesartan (AVAPRO) 300 MG tablet     levofloxacin (LEVAQUIN) 500 MG tablet     lovastatin (MEVACOR) 40 MG tablet     methimazole (TAPAZOLE) 5 MG tablet     Omega-3 Fatty Acids (FISH OIL) 500 MG CAPS     omeprazole (PRILOSEC) 20 MG DR capsule     propranolol ER (INDERAL LA) 60 MG 24 hr capsule     RESTASIS 0.05 % ophthalmic emulsion     rivaroxaban ANTICOAGULANT (XARELTO) 10 MG TABS tablet     rOPINIRole (REQUIP) 0.25 MG tablet     rOPINIRole (REQUIP) 0.25 MG tablet     sertraline (ZOLOFT) 100 MG tablet     traZODone (DESYREL) 50 MG tablet     Current Facility-Administered Medications   Medication     lidocaine (XYLOCAINE) 2 % external gel           Objective    /79   Pulse 71   Temp 99  F (37.2  C) (Temporal)   Resp 16   Ht 1.448 m (4' 9\")   Wt 77.1 kg (170 lb)   SpO2 92%   BMI 36.79 kg/m    Physical Exam     GEN: NAD, smiling  CV: HDS    Results for orders placed or performed in visit on 03/09/23   UA macro with reflex to Microscopic and Culture - Clinc Collect     Status: Abnormal    Specimen: Urine, Midstream "   Result Value Ref Range    Color Urine Yellow Colorless, Straw, Light Yellow, Yellow    Appearance Urine Slightly Cloudy (A) Clear    Glucose Urine Negative Negative mg/dL    Bilirubin Urine Negative Negative    Ketones Urine Negative Negative mg/dL    Specific Gravity Urine <=1.005 1.003 - 1.035    Blood Urine Trace (A) Negative    pH Urine 6.0 5.0 - 7.0    Protein Albumin Urine Negative Negative mg/dL    Urobilinogen Urine 0.2 0.2, 1.0 E.U./dL    Nitrite Urine Negative Negative    Leukocyte Esterase Urine Small (A) Negative   UA Microscopic with Reflex to Culture     Status: Abnormal   Result Value Ref Range    RBC Urine None Seen 0-2 /HPF /HPF    WBC Urine 25-50 (A) 0-5 /HPF /HPF    Transitional Epithelials Urine Few (A) None Seen /HPF             The use of Dragon/Hithru dictation services may have been used to construct the content in this note; any grammatical or spelling errors are non-intentional. Please contact the author of this note directly if you are in need of any clarification.

## 2023-03-11 LAB — BACTERIA UR CULT: ABNORMAL

## 2023-03-12 NOTE — TELEPHONE ENCOUNTER
Agree with Dr Denise and RN response. Should have some sort of visit. Just dropping UA is not appropriate due to her age and risk factors.

## 2023-03-14 ENCOUNTER — TELEPHONE (OUTPATIENT)
Dept: ENDOCRINOLOGY | Facility: CLINIC | Age: 88
End: 2023-03-14

## 2023-03-14 ASSESSMENT — ENCOUNTER SYMPTOMS
JOINT SWELLING: 1
PALPITATIONS: 0
FREQUENCY: 1
BREAST MASS: 0
ABDOMINAL PAIN: 0
SORE THROAT: 0
DIZZINESS: 0
PARESTHESIAS: 0
DYSURIA: 0
HEMATURIA: 0
MYALGIAS: 1
CONSTIPATION: 1
WEAKNESS: 0
SHORTNESS OF BREATH: 1
HEADACHES: 0
COUGH: 0
FEVER: 0
DIARRHEA: 0
NERVOUS/ANXIOUS: 0
HEARTBURN: 0
EYE PAIN: 0
ARTHRALGIAS: 1
CHILLS: 0
HEMATOCHEZIA: 0
NAUSEA: 0

## 2023-03-14 ASSESSMENT — ACTIVITIES OF DAILY LIVING (ADL)
CURRENT_FUNCTION: HOUSEWORK REQUIRES ASSISTANCE
CURRENT_FUNCTION: TRANSPORTATION REQUIRES ASSISTANCE

## 2023-03-14 NOTE — TELEPHONE ENCOUNTER
Spoke to pt over the phone and rescheduled appointment with Dr. Meza is provider will be out 6/13/23.   Justin Farley on 3/14/2023 at 11:47 AM

## 2023-03-15 ENCOUNTER — OFFICE VISIT (OUTPATIENT)
Dept: FAMILY MEDICINE | Facility: CLINIC | Age: 88
End: 2023-03-15
Payer: MEDICARE

## 2023-03-15 VITALS
OXYGEN SATURATION: 97 % | HEIGHT: 57 IN | TEMPERATURE: 97.2 F | BODY MASS INDEX: 37.54 KG/M2 | RESPIRATION RATE: 15 BRPM | SYSTOLIC BLOOD PRESSURE: 120 MMHG | HEART RATE: 72 BPM | WEIGHT: 174 LBS | DIASTOLIC BLOOD PRESSURE: 70 MMHG

## 2023-03-15 DIAGNOSIS — R30.0 DYSURIA: ICD-10-CM

## 2023-03-15 DIAGNOSIS — N39.0 RECURRENT UTI: ICD-10-CM

## 2023-03-15 DIAGNOSIS — K21.9 GASTROESOPHAGEAL REFLUX DISEASE WITHOUT ESOPHAGITIS: ICD-10-CM

## 2023-03-15 DIAGNOSIS — F41.1 GAD (GENERALIZED ANXIETY DISORDER): ICD-10-CM

## 2023-03-15 DIAGNOSIS — G25.81 RESTLESS LEGS SYNDROME: ICD-10-CM

## 2023-03-15 DIAGNOSIS — E04.2 NONTOXIC MULTINODULAR GOITER: ICD-10-CM

## 2023-03-15 DIAGNOSIS — I87.303 STASIS EDEMA OF BOTH LOWER EXTREMITIES: ICD-10-CM

## 2023-03-15 DIAGNOSIS — Z00.00 ENCOUNTER FOR MEDICARE ANNUAL WELLNESS EXAM: Primary | ICD-10-CM

## 2023-03-15 DIAGNOSIS — I10 HYPERTENSION GOAL BP (BLOOD PRESSURE) < 140/90: ICD-10-CM

## 2023-03-15 DIAGNOSIS — H61.23 BILATERAL IMPACTED CERUMEN: ICD-10-CM

## 2023-03-15 LAB
ALBUMIN UR-MCNC: NEGATIVE MG/DL
APPEARANCE UR: CLEAR
BACTERIA #/AREA URNS HPF: ABNORMAL /HPF
BILIRUB UR QL STRIP: NEGATIVE
COLOR UR AUTO: YELLOW
GLUCOSE UR STRIP-MCNC: NEGATIVE MG/DL
HGB UR QL STRIP: NEGATIVE
KETONES UR STRIP-MCNC: NEGATIVE MG/DL
LEUKOCYTE ESTERASE UR QL STRIP: ABNORMAL
NITRATE UR QL: NEGATIVE
PH UR STRIP: 6 [PH] (ref 5–7)
RBC #/AREA URNS AUTO: ABNORMAL /HPF
SP GR UR STRIP: 1.01 (ref 1–1.03)
SQUAMOUS #/AREA URNS AUTO: ABNORMAL /LPF
UROBILINOGEN UR STRIP-ACNC: 0.2 E.U./DL
WBC #/AREA URNS AUTO: ABNORMAL /HPF

## 2023-03-15 PROCEDURE — 99214 OFFICE O/P EST MOD 30 MIN: CPT | Mod: 25 | Performed by: FAMILY MEDICINE

## 2023-03-15 PROCEDURE — 87086 URINE CULTURE/COLONY COUNT: CPT | Performed by: FAMILY MEDICINE

## 2023-03-15 PROCEDURE — 69209 REMOVE IMPACTED EAR WAX UNI: CPT | Performed by: FAMILY MEDICINE

## 2023-03-15 PROCEDURE — 81001 URINALYSIS AUTO W/SCOPE: CPT | Performed by: FAMILY MEDICINE

## 2023-03-15 PROCEDURE — G0439 PPPS, SUBSEQ VISIT: HCPCS | Performed by: FAMILY MEDICINE

## 2023-03-15 RX ORDER — IRBESARTAN 300 MG/1
300 TABLET ORAL DAILY
Qty: 90 TABLET | Refills: 3 | Status: SHIPPED | OUTPATIENT
Start: 2023-03-15 | End: 2024-04-03

## 2023-03-15 RX ORDER — FUROSEMIDE 20 MG
TABLET ORAL
Qty: 180 TABLET | Refills: 3 | Status: SHIPPED | OUTPATIENT
Start: 2023-03-15 | End: 2024-04-23

## 2023-03-15 RX ORDER — ROPINIROLE 0.25 MG/1
TABLET, FILM COATED ORAL
Qty: 270 TABLET | Refills: 3 | Status: SHIPPED | OUTPATIENT
Start: 2023-03-15 | End: 2024-03-27

## 2023-03-15 RX ORDER — PROPRANOLOL HCL 60 MG
CAPSULE, EXTENDED RELEASE 24HR ORAL
Qty: 90 CAPSULE | Refills: 3 | Status: SHIPPED | OUTPATIENT
Start: 2023-03-15 | End: 2024-03-13

## 2023-03-15 RX ORDER — SERTRALINE HYDROCHLORIDE 100 MG/1
100 TABLET, FILM COATED ORAL EVERY MORNING
Qty: 90 TABLET | Refills: 3 | Status: SHIPPED | OUTPATIENT
Start: 2023-03-15 | End: 2024-03-22

## 2023-03-15 ASSESSMENT — ENCOUNTER SYMPTOMS
WEAKNESS: 0
MYALGIAS: 1
HEMATURIA: 0
SHORTNESS OF BREATH: 1
DIARRHEA: 0
HEARTBURN: 0
NERVOUS/ANXIOUS: 0
DIZZINESS: 0
CONSTIPATION: 1
NAUSEA: 0
BREAST MASS: 0
PARESTHESIAS: 0
FEVER: 0
ABDOMINAL PAIN: 0
CHILLS: 0
EYE PAIN: 0
FREQUENCY: 1
HEADACHES: 0
COUGH: 0
HEMATOCHEZIA: 0
JOINT SWELLING: 1
ARTHRALGIAS: 1
PALPITATIONS: 0
DYSURIA: 0
SORE THROAT: 0

## 2023-03-15 ASSESSMENT — ACTIVITIES OF DAILY LIVING (ADL)
CURRENT_FUNCTION: TRANSPORTATION REQUIRES ASSISTANCE
CURRENT_FUNCTION: HOUSEWORK REQUIRES ASSISTANCE

## 2023-03-15 ASSESSMENT — PAIN SCALES - GENERAL: PAINLEVEL: NO PAIN (0)

## 2023-03-15 NOTE — PATIENT INSTRUCTIONS
Patient Education   Personalized Prevention Plan  You are due for the preventive services outlined below.  Your care team is available to assist you in scheduling these services.  If you have already completed any of these items, please share that information with your care team to update in your medical record.  Health Maintenance Due   Topic Date Due     Annual Wellness Visit  11/04/2021     Asthma Control Test  03/12/2023         May return to work without restriction

## 2023-03-15 NOTE — NURSING NOTE
Patient identified using two patient identifiers.  Ear exam showing wax occlusion completed by RN.  Solution: warm water was placed in the both ear(s) via irrigation tool: elephant ear.

## 2023-03-15 NOTE — PROGRESS NOTES
"SUBJECTIVE:   Dimple is a 89 year old who presents for Preventive Visit.  Patient has been advised of split billing requirements and indicates understanding: Yes  Are you in the first 12 months of your Medicare coverage?  No    Healthy Habits:     In general, how would you rate your overall health?  Fair    Frequency of exercise:  None    Do you usually eat at least 4 servings of fruit and vegetables a day, include whole grains    & fiber and avoid regularly eating high fat or \"junk\" foods?  Yes    Taking medications regularly:  Yes    Medication side effects:  None    Ability to successfully perform activities of daily living:  Transportation requires assistance and housework requires assistance    Home Safety:  No safety concerns identified    Hearing Impairment:  Difficulty following a conversation in a noisy restaurant or crowded room, feel that people are mumbling or not speaking clearly, difficult to understand a speaker at a public meeting or [employer] service, need to ask people to speak up or repeat themselves, difficulty understanding soft or whispered speech and difficulty understanding speech on the telephone    In the past 6 months, have you been bothered by leaking of urine? Yes    In general, how would you rate your overall mental or emotional health?  Good      PHQ-2 Total Score: 0    Additional concerns today:  Yes    Have you ever done Advance Care Planning? (For example, a Health Directive, POLST, or a discussion with a medical provider or your loved ones about your wishes): Yes, advance care planning is on file.    Fall risk  Fallen 2 or more times in the past year?: No  Any fall with injury in the past year?: No    Cognitive Screening   1) Repeat 3 items (Leader, Season, Table)    2) Clock draw:NORMAL  3) 3 item recall:   Recalls 3 objects  Results: 3 items recalled: COGNITIVE IMPAIRMENT LESS LIKELY    Mini-CogTM Copyright KALA Pratt. Licensed by the author for use in [facility]; " reprinted with permission (soob@.[email or web address]). All rights reserved.          Reviewed and updated as needed this visit by clinical staff   Tobacco  Allergies  Meds              Reviewed and updated as needed this visit by Provider                 Social History     Tobacco Use     Smoking status: Never     Smokeless tobacco: Never   Substance Use Topics     Alcohol use: No     Alcohol/week: 0.0 standard drinks     Comment: rare         Alcohol Use 3/14/2023   Prescreen: >3 drinks/day or >7 drinks/week? Not Applicable   No flowsheet data found.             Current providers sharing in care for this patient include:    Patient Care Team:  Ppo Zapien MD as PCP - General (Family Medicine)  Vincenzo Tovar MD as MD (Family Medicine - Sports Medicine)  Candelaria Raymundo MD as MD (Gastroenterology)  Shavon Bustamante PA-C as Physician Assistant (Physician Assistant)  Kenna La MD as MD (Urology)  Cristiana Correa, RN as Registered Nurse (Urology)  Pop Zapien MD as Referring Physician (Family Practice)  Kiera Figueroa, RN as Registered Nurse (Urology)  Kiera Figueroa, RN as Registered Nurse (Urology)  Geovanna Fregoso PA as Physician Assistant (Urology)  Kenna La MD as MD (Urology)  Butch Griffith MD as MD (Clinical Cardiac Electrophysiology)  Butch Griffith MD as Assigned Heart and Vascular Provider  Pop Zapien MD as Assigned PCP  Justin Henry MD as Assigned Surgical Provider  Sd Fine MD as MD (Hematology & Oncology)  Sd Fine MD as Assigned Cancer Care Provider  Ema Tang, RN as Specialty Care Coordinator (Hematology & Oncology)    The following health maintenance items are reviewed in Epic and correct as of today:  Health Maintenance   Topic Date Due     MEDICARE ANNUAL WELLNESS VISIT  11/04/2021     ASTHMA CONTROL TEST  03/12/2023     ANNUAL REVIEW OF HM ORDERS  05/02/2023      ASTHMA ACTION PLAN  09/12/2023     LIPID  12/09/2023     MICROALBUMIN  12/09/2023     BMP  02/27/2024     HEMOGLOBIN  02/27/2024     FALL RISK ASSESSMENT  03/15/2024     ADVANCE CARE PLANNING  11/04/2025     DTAP/TDAP/TD IMMUNIZATION (4 - Td or Tdap) 09/04/2029     PHQ-2 (once per calendar year)  Completed     INFLUENZA VACCINE  Completed     Pneumococcal Vaccine: 65+ Years  Completed     URINALYSIS  Completed     ZOSTER IMMUNIZATION  Completed     COVID-19 Vaccine  Completed     IPV IMMUNIZATION  Aged Out     MENINGITIS IMMUNIZATION  Aged Out     MAMMO SCREENING  Discontinued     Took last pill yesterday.   Had uti x 2 recently.   Mostly at night time and when first gets up - urinates - almost complete emptying of bladder.   Oncology in June. Having infusion one more time.     Able to do most stuff without help. Cleaning gets help when needed.     Breathing is OK. When rushes some dyspnea. Does not do well with stairs.     Both leg lymphadema issues. Using compression socks.         Pertinent mammograms are reviewed under the imaging tab.    Review of Systems   Constitutional: Negative for chills and fever.   HENT: Positive for hearing loss. Negative for congestion, ear pain and sore throat.    Eyes: Positive for visual disturbance. Negative for pain.   Respiratory: Positive for shortness of breath. Negative for cough.    Cardiovascular: Positive for peripheral edema. Negative for chest pain and palpitations.   Gastrointestinal: Positive for constipation. Negative for abdominal pain, diarrhea, heartburn, hematochezia and nausea.   Breasts:  Negative for tenderness, breast mass and discharge.   Genitourinary: Positive for frequency and urgency. Negative for dysuria, genital sores, hematuria, pelvic pain, vaginal bleeding and vaginal discharge.   Musculoskeletal: Positive for arthralgias, joint swelling and myalgias.   Skin: Negative for rash.   Neurological: Negative for dizziness, weakness, headaches and  "paresthesias.   Psychiatric/Behavioral: Negative for mood changes. The patient is not nervous/anxious.        OBJECTIVE:   There were no vitals taken for this visit. Estimated body mass index is 36.79 kg/m  as calculated from the following:    Height as of 3/9/23: 1.448 m (4' 9\").    Weight as of 3/9/23: 77.1 kg (170 lb).  Physical Exam  GENERAL APPEARANCE: healthy, alert and no distress  EYES: Eyes grossly normal to inspection, PERRL and conjunctivae and sclerae normal  HENT: both ears hearing aids, ear wax bilaterally, nose and mouth without ulcers or lesions, oropharynx clear and oral mucous membranes moist  NECK: no adenopathy, no asymmetry, masses, or scars and thyroid normal to palpation  RESP: lungs clear to auscultation - no rales, rhonchi or wheezes  CV: regular rate and rhythm, normal S1 S2, no S3 or S4, no murmur, click or rub, and peripheral pulses strong  ABDOMEN: soft, nontender, no hepatosplenomegaly, no masses and bowel sounds normal  MS: no musculoskeletal defects are noted and gait is age appropriate without ataxia, both non pitting edema  SKIN: no suspicious lesions or rashes  NEURO: Normal strength and tone, sensory exam grossly normal, mentation intact and speech normal  PSYCH: mentation appears normal and affect normal/bright    Diagnostic Test Results:  Labs reviewed in Epic  none     ASSESSMENT / PLAN:   (Z00.00) Encounter for Medicare annual wellness exam  (primary encounter diagnosis)  Comment:    Plan:      (H61.23) Bilateral impacted cerumen  Comment: has hearing aids. Ear wash by MA/RN  Plan: OK REMOVAL IMPACTED CERUMEN IRRIGATION/LVG         UNILAT             (N39.0) Recurrent UTI  Comment: repeat urine culture due to 2 UTI back to back and history of bladder malignancy.   Plan: Urine Culture Aerobic Bacterial - lab collect             (I87.303) Stasis edema of both lower extremities  Comment:  Continue compression socks. Continue lasix. bp stable.   Plan: furosemide (LASIX) 20 MG " "tablet             (I10) Essential hypertension with goal blood pressure less than 140/90  Comment: Patient is tolerating current medication without any major side effects of concerns and current dose seems reasonable too.  Current medication regime is effective. Continue current treatment without any changes.   Plan: irbesartan (AVAPRO) 300 MG tablet             (K21.9) Gastroesophageal reflux disease without esophagitis  Comment: long term ppi side effects discussed. Continue same rx for now.   Plan: omeprazole (PRILOSEC) 20 MG DR capsule             (E04.2) Nontoxic multinodular goiter  Comment:  Patient is tolerating current medication without any major side effects of concerns and current dose seems reasonable too.  Current medication regime is effective. Continue current treatment without any changes.   Plan: propranolol ER (INDERAL LA) 60 MG 24 hr capsule             (I10) Hypertension goal BP (blood pressure) < 140/90  Comment:  See above.   Plan: propranolol ER (INDERAL LA) 60 MG 24 hr capsule             (G25.81) Restless legs syndrome  Comment: Patient is tolerating current medication without any major side effects of concerns and current dose seems reasonable too.  Current medication regime is effective. Continue current treatment without any changes.   Plan: rOPINIRole (REQUIP) 0.25 MG tablet             (F41.1) THERON (generalized anxiety disorder)  Comment: Patient is tolerating current medication without any major side effects of concerns and current dose seems reasonable too.  Current medication regime is effective. Continue current treatment without any changes.   Plan: sertraline (ZOLOFT) 100 MG tablet                      COUNSELING:  Reviewed preventive health counseling, as reflected in patient instructions      BMI:   Estimated body mass index is 36.79 kg/m  as calculated from the following:    Height as of 3/9/23: 1.448 m (4' 9\").    Weight as of 3/9/23: 77.1 kg (170 lb).   Weight management " plan: Discussed healthy diet and exercise guidelines      She reports that she has never smoked. She has never used smokeless tobacco.      Appropriate preventive services were discussed with this patient, including applicable screening as appropriate for cardiovascular disease, diabetes, osteopenia/osteoporosis, and glaucoma.  As appropriate for age/gender, discussed screening for colorectal cancer, prostate cancer, breast cancer, and cervical cancer. Checklist reviewing preventive services available has been given to the patient.    Reviewed patients plan of care and provided an AVS. The Intermediate Care Plan ( asthma action plan, low back pain action plan, and migraine action plan) for Dimple meets the Care Plan requirement. This Care Plan has been established and reviewed with the Patient.          Pop Zapien MD, MD  [facility]    Identified Health Risks:

## 2023-03-15 NOTE — PROGRESS NOTES
RN ear assessment completed post-MA ear irrigation. Post Ear Irrigation exam completed and cerumen plug removed, tympanic membrane intact and no bleeding noted.  Pain assessment completed.     Patient tolerated procedure:  yes    Vicenta Sandoval RN  [facility]

## 2023-03-16 PROBLEM — F41.1 GAD (GENERALIZED ANXIETY DISORDER): Status: ACTIVE | Noted: 2023-03-16

## 2023-03-16 PROBLEM — G25.81 RESTLESS LEGS SYNDROME: Status: ACTIVE | Noted: 2018-07-11

## 2023-03-16 NOTE — RESULT ENCOUNTER NOTE
Hello!   Thank you for getting labs done.     Your urine test was normal, with no sign of infection or kidney problems.     If you have any questions, please contact the clinic or schedule an appointment with me, thank you!    Sincerely,    RAZIA BRANTLEY MD   3/16/2023

## 2023-03-17 LAB — BACTERIA UR CULT: NORMAL

## 2023-03-23 ENCOUNTER — OFFICE VISIT (OUTPATIENT)
Dept: URGENT CARE | Facility: URGENT CARE | Age: 88
End: 2023-03-23
Payer: MEDICARE

## 2023-03-23 VITALS
SYSTOLIC BLOOD PRESSURE: 138 MMHG | OXYGEN SATURATION: 97 % | HEART RATE: 79 BPM | HEIGHT: 57 IN | BODY MASS INDEX: 37.54 KG/M2 | RESPIRATION RATE: 16 BRPM | DIASTOLIC BLOOD PRESSURE: 80 MMHG | TEMPERATURE: 97.9 F | WEIGHT: 174 LBS

## 2023-03-23 DIAGNOSIS — R30.0 DYSURIA: ICD-10-CM

## 2023-03-23 DIAGNOSIS — N39.0 RECURRENT UTI (URINARY TRACT INFECTION): Primary | ICD-10-CM

## 2023-03-23 LAB
ALBUMIN UR-MCNC: NEGATIVE MG/DL
APPEARANCE UR: ABNORMAL
BACTERIA #/AREA URNS HPF: ABNORMAL /HPF
BILIRUB UR QL STRIP: NEGATIVE
COLOR UR AUTO: YELLOW
GLUCOSE UR STRIP-MCNC: NEGATIVE MG/DL
HGB UR QL STRIP: ABNORMAL
KETONES UR STRIP-MCNC: NEGATIVE MG/DL
LEUKOCYTE ESTERASE UR QL STRIP: ABNORMAL
NITRATE UR QL: NEGATIVE
PH UR STRIP: 6 [PH] (ref 5–7)
RBC #/AREA URNS AUTO: ABNORMAL /HPF
SP GR UR STRIP: 1.01 (ref 1–1.03)
SQUAMOUS #/AREA URNS AUTO: ABNORMAL /LPF
UROBILINOGEN UR STRIP-ACNC: 0.2 E.U./DL
WBC #/AREA URNS AUTO: ABNORMAL /HPF

## 2023-03-23 PROCEDURE — 87186 SC STD MICRODIL/AGAR DIL: CPT | Performed by: FAMILY MEDICINE

## 2023-03-23 PROCEDURE — 87086 URINE CULTURE/COLONY COUNT: CPT | Performed by: FAMILY MEDICINE

## 2023-03-23 PROCEDURE — 81001 URINALYSIS AUTO W/SCOPE: CPT

## 2023-03-23 PROCEDURE — 99213 OFFICE O/P EST LOW 20 MIN: CPT | Performed by: FAMILY MEDICINE

## 2023-03-23 RX ORDER — SULFAMETHOXAZOLE/TRIMETHOPRIM 800-160 MG
1 TABLET ORAL 2 TIMES DAILY
Qty: 10 TABLET | Refills: 0 | Status: SHIPPED | OUTPATIENT
Start: 2023-03-23 | End: 2023-03-28

## 2023-03-23 NOTE — PATIENT INSTRUCTIONS
Trimethoprim/Bactrim antibiotic twice a day for 5 days    If symptoms worsen at any point or you are seeing no improvement within 2 days please return right away to be seen

## 2023-03-23 NOTE — PROGRESS NOTES
Assessment & Plan     Dysuria  - UA macro with reflex to Microscopic and Culture - Clinc Collect  - Urine Microscopic Exam  - Urine Culture    Recurrent UTI (urinary tract infection)  - sulfamethoxazole-trimethoprim (BACTRIM DS) 800-160 MG tablet  Dispense: 10 tablet; Refill: 0    Urine culture from last week showing no growth only with mixed hector prior to that with my appointment with her we have prescribed her cefdinir and she had pansensitive E. Coli --at this time we will proceed with Bactrim therapy twice a day for 5 days.     Wesley Vaughan MD   [facility]    Davey Musa is a 89 year old female who presents to clinic today for the following health issues:  Chief Complaint   Patient presents with     Urgent Care     UTI     Was here a week ago for same thing. Cloudy urine again, urinary frequency      HPI    Patient was recently treated for bladder infection a week ago she was placed on a cephalosporin she noted improvement but symptoms have now returned.  She reports good oral hydration.  She is currently treated for bladder cancer her last infusion is coming up in the next couple weeks with her follow-up with urology set for June.  She has not had any fevers back pain or chills.  She has not seen blood in her urine.          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     Priority: Medium     Reviewed CT scans, has been stable and unchanged since 2019  Last scan in 2021       Atrial fibrillation with rapid ventricular response (H) 09/01/2021     Priority: Medium     Chronic kidney disease, stage 3 (H) 02/23/2021     Priority: Medium     Primary osteoarthritis of right knee 10/09/2020      Priority: Medium     Added automatically from request for surgery [id number]       Malignant neoplasm of overlapping sites of bladder (H) 08/19/2020     Priority: Medium     Added automatically from request for surgery [id number]       Urothelial cancer (H) 12/17/2019     Priority: Medium     Added automatically from request for surgery [id number]       Primary localized osteoarthritis of right knee 09/25/2019     Priority: Medium     Added automatically from request for surgery [id number]       Encounter for antineoplastic chemotherapy 01/09/2019     Priority: Medium     Graves disease 12/04/2018     Priority: Medium     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     Priority: Medium     Hydronephrosis 09/11/2018     Priority: Medium     Restless legs syndrome 07/11/2018     Priority: Medium     Hyperthyroidism 06/28/2018     Priority: Medium     Lipedematous scalp 06/01/2018     Priority: Medium     Urothelial carcinoma (H) 03/22/2018     Priority: Medium     Chronic systolic heart failure (H) 02/20/2018     Priority: Medium     Anxiety 02/07/2018     Priority: Medium     A-fib (H) 01/16/2018     Priority: Medium     Stress-induced cardiomyopathy 12/14/2017     Priority: Medium     NSTEMI (non-ST elevated myocardial infarction) (H) 12/13/2017     Priority: Medium     Iron deficiency anemia, unspecified iron deficiency anemia type 11/30/2017     Priority: Medium     Obesity, unspecified obesity severity, unspecified obesity type 02/22/2017     Priority: Medium     Chronic bilateral low back pain without sciatica 12/05/2016     Priority: Medium     Impaired fasting blood sugar 11/24/2015     Priority: Medium     Shoulder pain 10/31/2014     Priority: Medium     High risk medication use 10/15/2014     Priority: Medium     Polymyalgia rheumatica (H) 07/10/2014     Priority: Medium     Hip arthritis 06/23/2014     Priority: Medium     Hypertension goal BP (blood pressure) < 140/90 11/30/2011     Priority:  "Medium     Urinary incontinence 11/30/2011     Priority: Medium     Hyperlipidemia LDL goal <130 05/09/2010     Priority: Medium     Nontoxic multinodular goiter      Priority: Medium     Malignant melanoma of skin of trunk, except scrotum (H)      Priority: Medium     perirectal cyst 12/16/2005     Priority: Medium     Restless leg syndrome 10/12/2005     Priority: Medium     heat intolerance 10/12/2005     Priority: Medium     Goiter 10/12/2005     Priority: Medium     Problem list name updated by automated process. Provider to review       Disorder of bone and cartilage 10/12/2005     Priority: Medium     Problem list name updated by automated process. Provider to review       Other psoriasis 10/12/2005     Priority: Medium     Esophageal reflux 09/22/2004     Priority: Medium       Current Outpatient Medications   Medication     alendronate (FOSAMAX) 70 MG tablet     diltiazem ER (DILT-XR) 180 MG 24 hr capsule     ferrous sulfate 325 (65 Fe) MG TBEC EC tablet     furosemide (LASIX) 20 MG tablet     irbesartan (AVAPRO) 300 MG tablet     lovastatin (MEVACOR) 40 MG tablet     methimazole (TAPAZOLE) 5 MG tablet     Omega-3 Fatty Acids (FISH OIL) 500 MG CAPS     omeprazole (PRILOSEC) 20 MG DR capsule     propranolol ER (INDERAL LA) 60 MG 24 hr capsule     RESTASIS 0.05 % ophthalmic emulsion     rivaroxaban ANTICOAGULANT (XARELTO) 10 MG TABS tablet     rOPINIRole (REQUIP) 0.25 MG tablet     sertraline (ZOLOFT) 100 MG tablet     sulfamethoxazole-trimethoprim (BACTRIM DS) 800-160 MG tablet     traZODone (DESYREL) 50 MG tablet     levofloxacin (LEVAQUIN) 500 MG tablet     Current Facility-Administered Medications   Medication     lidocaine (XYLOCAINE) 2 % external gel           Objective    /80   Pulse 79   Temp 97.9  F (36.6  C) (Temporal)   Resp 16   Ht 1.448 m (4' 9\")   Wt 78.9 kg (174 lb)   SpO2 97%   BMI 37.65 kg/m    Physical Exam         Results for orders placed or performed in visit on 03/23/23   UA " macro with reflex to Microscopic and Culture - Clinc Collect     Status: Abnormal    Specimen: Urine, Midstream   Result Value Ref Range    Color Urine Yellow Colorless, Straw, Light Yellow, Yellow    Appearance Urine Cloudy (A) Clear    Glucose Urine Negative Negative mg/dL    Bilirubin Urine Negative Negative    Ketones Urine Negative Negative mg/dL    Specific Gravity Urine 1.010 1.003 - 1.035    Blood Urine Small (A) Negative    pH Urine 6.0 5.0 - 7.0    Protein Albumin Urine Negative Negative mg/dL    Urobilinogen Urine 0.2 0.2, 1.0 E.U./dL    Nitrite Urine Negative Negative    Leukocyte Esterase Urine Large (A) Negative   Urine Microscopic Exam     Status: Abnormal   Result Value Ref Range    Bacteria Urine Moderate (A) None Seen /HPF    RBC Urine 0-2 0-2 /HPF /HPF    WBC Urine  (A) 0-5 /HPF /HPF    Squamous Epithelials Urine Few (A) None Seen /LPF         The use of Dragon/[employer] dictation services may have been used to construct the content in this note; any grammatical or spelling errors are non-intentional. Please contact the author of this note directly if you are in need of any clarification.

## 2023-03-25 LAB — BACTERIA UR CULT: ABNORMAL

## 2023-04-27 RX ORDER — FERROUS SULFATE 325(65) MG
325 TABLET, DELAYED RELEASE (ENTERIC COATED) ORAL EVERY MORNING
OUTPATIENT
Start: 2023-04-27

## 2023-04-27 NOTE — TELEPHONE ENCOUNTER
Routing refill request to provider for review/approval because:  --I don't see that this has ever been ordered by a provider.  --Historical entries only and discontinued 3/9/18 by [facility] for reason constipation.  --But it is active in chart as of 9/13/18 as entered by  in-patient staff of unknown position.  --Can we deny?        --Last visit:  3/15/2023 [facility] for Medicare visit.    --Future Visit:     --Last Written Prescription:     Disp Refills Start End PENNIE   ferrous sulfate 325 (65 Fe) MG TBEC EC tablet     --   Sig - Route: Take 325 mg by mouth every morning  - Oral   Class: Historical

## 2023-05-15 ENCOUNTER — TELEPHONE (OUTPATIENT)
Dept: FAMILY MEDICINE | Facility: CLINIC | Age: 88
End: 2023-05-15
Payer: MEDICARE

## 2023-05-15 NOTE — TELEPHONE ENCOUNTER
New Medication Request    Contacts       Type Contact Phone/Fax    05/15/2023 02:45 PM CDT Phone (Incoming) Dimple Oviedo (Self) [phone] (H)          What medication are you requesting?: Antibiotics     Reason for medication request: Has dentist appt on May 25th and was told to get antibiotics from her primary. I asked if they said a certain antibiotic or the reasoning for it to be requested and her response was that it is due to her knee replacement and we probably get these requests all the time so the provider will know what needs to be sent.     Have you taken this medication before?: No    Controlled Substance Agreement on file:   CSA -- Patient Level:    CSA: None found at the patient level.         Patient offered an appointment? No    Preferred Pharmacy:    [facility] - [place], [place] - [address] [place] [address]  Phone: [phone] Fax: [phone]      Could we send this information to you in [facility] or would you prefer to receive a phone call?:   Patient would prefer a phone call   Okay to leave a detailed message?: Yes at Home number on file [phone] (home)

## 2023-06-12 ENCOUNTER — ANCILLARY PROCEDURE (OUTPATIENT)
Dept: CT IMAGING | Facility: CLINIC | Age: 88
End: 2023-06-12
Attending: INTERNAL MEDICINE
Payer: MEDICARE

## 2023-06-12 DIAGNOSIS — E05.00 GRAVES DISEASE: ICD-10-CM

## 2023-06-12 DIAGNOSIS — C68.9 UROTHELIAL CANCER (H): ICD-10-CM

## 2023-06-12 LAB
CREAT BLD-MCNC: 0.9 MG/DL (ref 0.5–1)
GFR SERPL CREATININE-BSD FRML MDRD: >60 ML/MIN/1.73M2

## 2023-06-12 PROCEDURE — 71260 CT THORAX DX C+: CPT | Mod: MG | Performed by: RADIOLOGY

## 2023-06-12 PROCEDURE — G1010 CDSM STANSON: HCPCS | Performed by: RADIOLOGY

## 2023-06-12 PROCEDURE — 74177 CT ABD & PELVIS W/CONTRAST: CPT | Mod: MG | Performed by: RADIOLOGY

## 2023-06-12 PROCEDURE — 82565 ASSAY OF CREATININE: CPT | Performed by: INTERNAL MEDICINE

## 2023-06-12 RX ORDER — IOPAMIDOL 755 MG/ML
96 INJECTION, SOLUTION INTRAVASCULAR ONCE
Status: COMPLETED | OUTPATIENT
Start: 2023-06-12 | End: 2023-06-12

## 2023-06-12 RX ADMIN — IOPAMIDOL 96 ML: 755 INJECTION, SOLUTION INTRAVASCULAR at 13:42

## 2023-06-19 ENCOUNTER — OFFICE VISIT (OUTPATIENT)
Dept: ENDOCRINOLOGY | Facility: CLINIC | Age: 88
End: 2023-06-19
Payer: MEDICARE

## 2023-06-19 VITALS
SYSTOLIC BLOOD PRESSURE: 146 MMHG | HEART RATE: 74 BPM | WEIGHT: 176 LBS | DIASTOLIC BLOOD PRESSURE: 77 MMHG | BODY MASS INDEX: 38.09 KG/M2

## 2023-06-19 DIAGNOSIS — E05.00 GRAVES DISEASE: Primary | ICD-10-CM

## 2023-06-19 PROCEDURE — 99213 OFFICE O/P EST LOW 20 MIN: CPT | Performed by: INTERNAL MEDICINE

## 2023-06-19 NOTE — NURSING NOTE
Chief Complaint   Patient presents with     Endocrine Problem     Thyroid     Blood pressure (!) 146/77, pulse 74, weight 79.8 kg (176 lb), not currently breastfeeding.    Trena Ferreira

## 2023-06-19 NOTE — PROGRESS NOTES
This 89  year old woman was seen  for f/u of Graves. She first developed symptoms of hyperthyroidism in December 2017.  She was most troubled by the tremor that occurred.  She had not noticed change in her skin or hair.  She saw Dr. Rojas in the community and he started her on methimazole.  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  Because she had received an iodine dye load when she had a coronary angiogram, we were unable to do a radioiodine scan and uptake to determine the precise cause of her hyperthyroidism.  She was maintained on methimazole until June 2017 when it was stopped.  A radioiodine uptake and scan was done in July, showed an uptake of ~ 70%, and confirmed she had Graves' disease.  She was restarted on methimazole in July 2018 and we have been adjusting the dose since then.  She has been taking 5 mg alternating with 2.5  every AM every other day for more than a year/  On this dose she generally felt well.Her energy level is good.  Her appetite is fine.  She has no tremor.  She does have atrial fibrillation but reports that she does not feel any palpitations.  She does not feel too hot or cold.  She denies diplopia or mattering.  She doesn't think her thyroid has changed in size.     She is concerned about\the chronic swelling in her lower extremities that has been attributed to lymphedema.  She wears support hose all the time but she thinks the problem is getting worse.  She has no chest pain.  She does not have shortness of breath or PND.    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  She is being carefully monitored for recurrence.  She is pain-free and feels very good.  She is scheduled to undergo a final infusion as treatment for this later this week.  She is looking forward to being done with her chemotherapy regimen.      Patient Active Problem List   Diagnosis      Esophageal reflux     Restless leg syndrome     heat intolerance     Goiter     Disorder of bone and cartilage     Other psoriasis     perirectal cyst     Malignant melanoma of skin of trunk, except scrotum (H)     Nontoxic multinodular goiter     Hyperlipidemia LDL goal <130     Hypertension goal BP (blood pressure) < 140/90     Urinary incontinence     Hip arthritis     Polymyalgia rheumatica (H)     High risk medication use     Shoulder pain     Impaired fasting blood sugar     Chronic bilateral low back pain without sciatica     Obesity, unspecified obesity severity, unspecified obesity type     Iron deficiency anemia, unspecified iron deficiency anemia type     NSTEMI (non-ST elevated myocardial infarction) (H)     Stress-induced cardiomyopathy     A-fib (H)     Anxiety     Chronic systolic heart failure (H)     Urothelial carcinoma (H)     Hyperthyroidism     Restless legs syndrome     Hydronephrosis     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)     Graves disease     Encounter for antineoplastic chemotherapy     Primary localized osteoarthritis of right knee     Urothelial cancer (H)     Malignant neoplasm of overlapping sites of bladder (H)     Primary osteoarthritis of right knee     Chronic kidney disease, stage 3 (H)     Lipedematous scalp     Atrial fibrillation with rapid ventricular response (H)     bmi over 35 with comorbidities      Aneurysm of renal artery (H)     Osteopenia, unspecified location     Asthma     THERON (generalized anxiety disorder)       Current Outpatient Medications   Medication     alendronate (FOSAMAX) 70 MG tablet     diltiazem ER (DILT-XR) 180 MG 24 hr capsule     ferrous sulfate 325 (65 Fe) MG TBEC EC tablet     furosemide (LASIX) 20 MG tablet     irbesartan (AVAPRO) 300 MG tablet     levofloxacin (LEVAQUIN) 500 MG tablet     lovastatin (MEVACOR) 40 MG tablet     methimazole (TAPAZOLE) 5 MG tablet     Omega-3 Fatty Acids (FISH OIL) 500 MG CAPS     omeprazole (PRILOSEC) 20  MG DR capsule     propranolol ER (INDERAL LA) 60 MG 24 hr capsule     RESTASIS 0.05 % ophthalmic emulsion     rivaroxaban ANTICOAGULANT (XARELTO) 10 MG TABS tablet     rOPINIRole (REQUIP) 0.25 MG tablet     sertraline (ZOLOFT) 100 MG tablet     traZODone (DESYREL) 50 MG tablet     Current Facility-Administered Medications   Medication     lidocaine (XYLOCAINE) 2 % external gel       BP (!) 146/77 (BP Location: Right arm, Patient Position: Sitting, Cuff Size: Adult Regular)   Pulse 74   Wt 79.8 kg (176 lb)   BMI 38.09 kg/m       VSS  NAD  Eyes - no periorbital edema, conjunctival injection, scleral icterus  Neck - no thyromegaly  Lungs - clear  CV - RRR.  Lymphedema present in both legs.  She is wearing support stockings but has 3+ edema.  Neuro - .  DTR 2/4 biceps  Skin - normal texture          Latest Ref Rng 5/27/2021  3:15 PM 7/5/2021  1:47 PM 9/2/2021  6:44 AM   ENDO THYROID LABS-[facility]       TSH 0.40 - 4.00 mU/L 1.93  1.68  1.24    TSH 0.30 - 4.20 uIU/mL      Free T3 2.3 - 4.2 pg/mL      Triiodothyronine (T3) 60 - 181 ng/dL 81      FREE T4 0.76 - 1.46 ng/dL 0.62 (L)  0.79     Thyroglobulin Antibody <40 IU/mL      Thyroid Peroxidase Antibody <35 IU/mL      Thyroid Stim Immunog <=1.3 TSI index 3.1 (H)         Latest Ref Rn 5/20/2022  8:56 AM 12/9/2022  10:00 AM   ENDO THYROID LABS-[facility]      TSH 0.40 - 4.00 mU/L 2.90     TSH 0.30 - 4.20 uIU/mL  2.62    Free T3 2.3 - 4.2 pg/mL     Triiodothyronine (T3) 60 - 181 ng/dL     FREE T4 0.76 - 1.46 ng/dL     Thyroglobulin Antibody <40 IU/mL     Thyroid Peroxidase Antibody <35 IU/mL     Thyroid Stim Immunog <=1.3 TSI index        Assessment and plan:    This 89-year-old woman has Graves' disease that has been managed with methimazole for the last 4 to 5 years.  She is on a stable dose and appears to be euthyroid today.  I will check her thyroid function tests and adjust the dose of methimazole as necessary.    She does have lymphedema that is troubling to her.  She has been  to the lymphedema clinic and does not think they have anything else to offer her.  I find no evidence of heart failure that could be exacerbating this problem today.  I suggested she discuss it with her cardiologist when she sees him next.    I will plan to see her back in 1 year, earlier if her methimazole dose needs to be adjusted.    Dhara Meza MD

## 2023-06-19 NOTE — PATIENT INSTRUCTIONS
Have your labs done today    Keep taking 5 mg of methimazole alternating with 2.5 mg every other day

## 2023-06-19 NOTE — LETTER
6/19/2023       RE: Dimple Oviedo  [address]  [facility] [address]     Dear Colleague,    Thank you for referring your patient, Dimple Oviedo, to the [facility] [place] at [facility]. Please see a copy of my visit note below.      This 89  year old woman was seen  for f/u of Graves. She first developed symptoms of hyperthyroidism in December 2017.  She was most troubled by the tremor that occurred.  She had not noticed change in her skin or hair.  She saw Dr. Rojas in the community and he started her on methimazole.  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  Because she had received an iodine dye load when she had a coronary angiogram, we were unable to do a radioiodine scan and uptake to determine the precise cause of her hyperthyroidism.  She was maintained on methimazole until June 2017 when it was stopped.  A radioiodine uptake and scan was done in July, showed an uptake of ~ 70%, and confirmed she had Graves' disease.  She was restarted on methimazole in July 2018 and we have been adjusting the dose since then.  She has been taking 5 mg alternating with 2.5  every AM every other day for more than a year/  On this dose she generally felt well.Her energy level is good.  Her appetite is fine.  She has no tremor.  She does have atrial fibrillation but reports that she does not feel any palpitations.  She does not feel too hot or cold.  She denies diplopia or mattering.  She doesn't think her thyroid has changed in size.     She is concerned about\the chronic swelling in her lower extremities that has been attributed to lymphedema.  She wears support hose all the time but she thinks the problem is getting worse.  She has no chest pain.  She does not have shortness of breath or PND.    In 2018 she underwent surgery and chemotherapy for  a urethral carcinoma of the ureter.  She is being carefully monitored for recurrence.  She is pain-free and feels very good.  She is scheduled to undergo a final infusion as treatment for this later this week.  She is looking forward to being done with her chemotherapy regimen.      Patient Active Problem List   Diagnosis    Esophageal reflux    Restless leg syndrome    heat intolerance    Goiter    Disorder of bone and cartilage    Other psoriasis    perirectal cyst    Malignant melanoma of skin of trunk, except scrotum (H)    Nontoxic multinodular goiter    Hyperlipidemia LDL goal <130    Hypertension goal BP (blood pressure) < 140/90    Urinary incontinence    Hip arthritis    Polymyalgia rheumatica (H)    High risk medication use    Shoulder pain    Impaired fasting blood sugar    Chronic bilateral low back pain without sciatica    Obesity, unspecified obesity severity, unspecified obesity type    Iron deficiency anemia, unspecified iron deficiency anemia type    NSTEMI (non-ST elevated myocardial infarction) (H)    Stress-induced cardiomyopathy    A-fib (H)    Anxiety    Chronic systolic heart failure (H)    Urothelial carcinoma (H)    Hyperthyroidism    Restless legs syndrome    Hydronephrosis    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)    Graves disease    Encounter for antineoplastic chemotherapy    Primary localized osteoarthritis of right knee    Urothelial cancer (H)    Malignant neoplasm of overlapping sites of bladder (H)    Primary osteoarthritis of right knee    Chronic kidney disease, stage 3 (H)    Lipedematous scalp    Atrial fibrillation with rapid ventricular response (H)    bmi over 35 with comorbidities     Aneurysm of renal artery (H)    Osteopenia, unspecified location    Asthma    THERON (generalized anxiety disorder)       Current Outpatient Medications   Medication    alendronate (FOSAMAX) 70 MG tablet    diltiazem ER (DILT-XR) 180 MG 24 hr capsule    ferrous sulfate 325 (65 Fe) MG  TBEC EC tablet    furosemide (LASIX) 20 MG tablet    irbesartan (AVAPRO) 300 MG tablet    levofloxacin (LEVAQUIN) 500 MG tablet    lovastatin (MEVACOR) 40 MG tablet    methimazole (TAPAZOLE) 5 MG tablet    Omega-3 Fatty Acids (FISH OIL) 500 MG CAPS    omeprazole (PRILOSEC) 20 MG DR capsule    propranolol ER (INDERAL LA) 60 MG 24 hr capsule    RESTASIS 0.05 % ophthalmic emulsion    rivaroxaban ANTICOAGULANT (XARELTO) 10 MG TABS tablet    rOPINIRole (REQUIP) 0.25 MG tablet    sertraline (ZOLOFT) 100 MG tablet    traZODone (DESYREL) 50 MG tablet     Current Facility-Administered Medications   Medication    lidocaine (XYLOCAINE) 2 % external gel       BP (!) 146/77 (BP Location: Right arm, Patient Position: Sitting, Cuff Size: Adult Regular)   Pulse 74   Wt 79.8 kg (176 lb)   BMI 38.09 kg/m       VSS  NAD  Eyes - no periorbital edema, conjunctival injection, scleral icterus  Neck - no thyromegaly  Lungs - clear  CV - RRR.  Lymphedema present in both legs.  She is wearing support stockings but has 3+ edema.  Neuro - .  DTR 2/4 biceps  Skin - normal texture          Latest Ref Rng 5/27/2021  3:15 PM 7/5/2021  1:47 PM 9/2/2021  6:44 AM   ENDO THYROID LABS-[facility]       TSH 0.40 - 4.00 mU/L 1.93  1.68  1.24    TSH 0.30 - 4.20 uIU/mL      Free T3 2.3 - 4.2 pg/mL      Triiodothyronine (T3) 60 - 181 ng/dL 81      FREE T4 0.76 - 1.46 ng/dL 0.62 (L)  0.79     Thyroglobulin Antibody <40 IU/mL      Thyroid Peroxidase Antibody <35 IU/mL      Thyroid Stim Immunog <=1.3 TSI index 3.1 (H)         Latest Ref Rn 5/20/2022  8:56 AM 12/9/2022  10:00 AM   ENDO THYROID LABS-[facility]      TSH 0.40 - 4.00 mU/L 2.90     TSH 0.30 - 4.20 uIU/mL  2.62    Free T3 2.3 - 4.2 pg/mL     Triiodothyronine (T3) 60 - 181 ng/dL     FREE T4 0.76 - 1.46 ng/dL     Thyroglobulin Antibody <40 IU/mL     Thyroid Peroxidase Antibody <35 IU/mL     Thyroid Stim Immunog <=1.3 TSI index        Assessment and plan:    This 89-year-old woman has Graves' disease that has been  managed with methimazole for the last 4 to 5 years.  She is on a stable dose and appears to be euthyroid today.  I will check her thyroid function tests and adjust the dose of methimazole as necessary.    She does have lymphedema that is troubling to her.  She has been to the lymphedema clinic and does not think they have anything else to offer her.  I find no evidence of heart failure that could be exacerbating this problem today.  I suggested she discuss it with her cardiologist when she sees him next.    I will plan to see her back in 1 year, earlier if her methimazole dose needs to be adjusted.    Dhara Meza MD

## 2023-06-21 DIAGNOSIS — C68.9 UROTHELIAL CANCER (H): Primary | ICD-10-CM

## 2023-06-21 DIAGNOSIS — C68.9 UROTHELIAL CARCINOMA (H): ICD-10-CM

## 2023-06-21 DIAGNOSIS — C66.1 UROTHELIAL CARCINOMA OF RIGHT DISTAL URETER (H): ICD-10-CM

## 2023-06-21 RX ORDER — LIDOCAINE HYDROCHLORIDE 20 MG/ML
10 JELLY TOPICAL
OUTPATIENT
Start: 2023-12-27

## 2023-06-21 RX ORDER — PETROLATUM,WHITE
OINTMENT IN PACKET (GRAM) TOPICAL
OUTPATIENT
Start: 2023-12-20

## 2023-06-21 RX ORDER — PETROLATUM,WHITE
OINTMENT IN PACKET (GRAM) TOPICAL
OUTPATIENT
Start: 2023-12-27

## 2023-06-21 RX ORDER — LIDOCAINE HYDROCHLORIDE 20 MG/ML
10 JELLY TOPICAL
Status: CANCELLED | OUTPATIENT
Start: 2023-06-21

## 2023-06-21 RX ORDER — LIDOCAINE HYDROCHLORIDE 20 MG/ML
10 JELLY TOPICAL
Status: CANCELLED | OUTPATIENT
Start: 2023-06-30

## 2023-06-21 RX ORDER — LIDOCAINE HYDROCHLORIDE 20 MG/ML
10 JELLY TOPICAL
Status: CANCELLED | OUTPATIENT
Start: 2023-06-23

## 2023-06-21 RX ORDER — LIDOCAINE HYDROCHLORIDE 20 MG/ML
10 JELLY TOPICAL
OUTPATIENT
Start: 2023-12-20

## 2023-06-21 RX ORDER — PETROLATUM,WHITE
OINTMENT IN PACKET (GRAM) TOPICAL
OUTPATIENT
Start: 2023-12-13

## 2023-06-21 RX ORDER — LIDOCAINE HYDROCHLORIDE 20 MG/ML
10 JELLY TOPICAL
OUTPATIENT
Start: 2023-12-13

## 2023-06-22 ENCOUNTER — LAB (OUTPATIENT)
Dept: LAB | Facility: CLINIC | Age: 88
End: 2023-06-22
Payer: MEDICARE

## 2023-06-22 ENCOUNTER — INFUSION THERAPY VISIT (OUTPATIENT)
Dept: ONCOLOGY | Facility: CLINIC | Age: 88
End: 2023-06-22
Attending: UROLOGY
Payer: MEDICARE

## 2023-06-22 VITALS
OXYGEN SATURATION: 96 % | TEMPERATURE: 98.2 F | HEART RATE: 82 BPM | RESPIRATION RATE: 16 BRPM | SYSTOLIC BLOOD PRESSURE: 126 MMHG | DIASTOLIC BLOOD PRESSURE: 63 MMHG

## 2023-06-22 DIAGNOSIS — E05.00 GRAVES DISEASE: ICD-10-CM

## 2023-06-22 DIAGNOSIS — C68.9 UROTHELIAL CANCER (H): Primary | ICD-10-CM

## 2023-06-22 DIAGNOSIS — C68.9 UROTHELIAL CARCINOMA (H): Primary | ICD-10-CM

## 2023-06-22 DIAGNOSIS — C68.9 UROTHELIAL CANCER (H): ICD-10-CM

## 2023-06-22 DIAGNOSIS — C66.1 UROTHELIAL CARCINOMA OF RIGHT DISTAL URETER (H): ICD-10-CM

## 2023-06-22 LAB
ALBUMIN UR-MCNC: NEGATIVE MG/DL
APPEARANCE UR: CLEAR
BILIRUB UR QL STRIP: NEGATIVE
COLOR UR AUTO: YELLOW
GLUCOSE UR STRIP-MCNC: NEGATIVE MG/DL
HGB UR QL STRIP: ABNORMAL
KETONES UR STRIP-MCNC: NEGATIVE MG/DL
LEUKOCYTE ESTERASE UR QL STRIP: ABNORMAL
NITRATE UR QL: NEGATIVE
PH UR STRIP: 6 [PH] (ref 5–7)
SP GR UR STRIP: 1.01 (ref 1–1.03)
T3 SERPL-MCNC: 91 NG/DL (ref 85–202)
T4 FREE SERPL-MCNC: 0.84 NG/DL (ref 0.9–1.7)
TSH SERPL DL<=0.005 MIU/L-ACNC: 2.39 UIU/ML (ref 0.3–4.2)
UROBILINOGEN UR STRIP-MCNC: NORMAL MG/DL

## 2023-06-22 PROCEDURE — 36415 COLL VENOUS BLD VENIPUNCTURE: CPT | Performed by: PATHOLOGY

## 2023-06-22 PROCEDURE — 84439 ASSAY OF FREE THYROXINE: CPT | Performed by: PATHOLOGY

## 2023-06-22 PROCEDURE — 84443 ASSAY THYROID STIM HORMONE: CPT | Performed by: PATHOLOGY

## 2023-06-22 PROCEDURE — 84480 ASSAY TRIIODOTHYRONINE (T3): CPT | Performed by: INTERNAL MEDICINE

## 2023-06-22 PROCEDURE — 250N000011 HC RX IP 250 OP 636: Mod: JZ | Performed by: UROLOGY

## 2023-06-22 PROCEDURE — 81003 URINALYSIS AUTO W/O SCOPE: CPT | Performed by: UROLOGY

## 2023-06-22 PROCEDURE — 99000 SPECIMEN HANDLING OFFICE-LAB: CPT | Performed by: PATHOLOGY

## 2023-06-22 PROCEDURE — 51720 TREATMENT OF BLADDER LESION: CPT

## 2023-06-22 RX ORDER — LEVOFLOXACIN 500 MG/1
500 TABLET, FILM COATED ORAL DAILY
Qty: 1 TABLET | Refills: 0 | Status: SHIPPED | OUTPATIENT
Start: 2023-06-22 | End: 2023-06-23

## 2023-06-22 RX ORDER — LEVOFLOXACIN 500 MG/1
TABLET, FILM COATED ORAL
Qty: 6 TABLET | Refills: 0 | Status: SHIPPED | OUTPATIENT
Start: 2023-06-22 | End: 2023-11-08

## 2023-06-22 RX ORDER — LEVOFLOXACIN 500 MG/1
TABLET, FILM COATED ORAL
Qty: 6 TABLET | Refills: 0 | Status: SHIPPED | OUTPATIENT
Start: 2023-06-22 | End: 2023-06-29

## 2023-06-22 RX ADMIN — BACILLUS CALMETTE-GUERIN 50 MG: 50 POWDER, FOR SUSPENSION INTRAVESICAL at 13:22

## 2023-06-22 ASSESSMENT — PAIN SCALES - GENERAL: PAINLEVEL: NO PAIN (0)

## 2023-06-22 NOTE — PROGRESS NOTES
"Infusion Nursing Note:  Dimple Oviedo presents today for BCG Maintenance dose 1 of 3.    Patient seen by provider today: No   present during visit today: Not Applicable.    Note: Currently has no urological complaints.  Had loose stool this morning which sometimes happens.  She denies any infectious symptoms and fees well to proceed with treatment.  Did not receive Levaquin.  Urology has sent Rx.    No visible blood seen in today's urine sent to lab..      Treatment Conditions:  UA RESULTS:  Recent Labs   Lab Test 06/22/23  1205 03/23/23  1243   COLOR Yellow Yellow   APPEARANCE Clear Cloudy*   URINEGLC Negative Negative   URINEBILI Negative Negative   URINEKETONE Negative Negative   SG 1.013 1.010   UBLD Moderate* Small*   URINEPH 6.0 6.0   PROTEIN Negative Negative   UROBILINOGEN  --  0.2   NITRITE Negative Negative   LEUKEST Small* Large*   RBCU  --  0-2   WBCU  --  *         Pre-procedure Assessment:  Dysuria:  No  Hematuria:  No  Fever/chills:  No  Increased urinary urgency: No  Increased urinary frequency: No    Procedure and Post-procedure assessment\"  16F Goldman kit used.  Catheter placed without trauma.  Clear/yellow urine drained from bladder.    Patient turned every 15 minutes per protocol: Yes, turning completed at clinic per protocol. Dwells here with goldman clamped.  Patient tolerated instillation without incident.  Patient dwelled for 120 minutes.      Patient instructed on the following and verbalized understanding:   Medication to remain in the bladder for 2 hours post instillation: YES  Post instillation side effects and precautions discussed: YES    Discharge Plan:   Prescription refills given for Levaquin.  Copy of AVS reviewed with patient and/or family.  Patient will return 6/29/23 for next appointment.  Patient discharged in stable condition accompanied by: self.  Departure Mode: Ambulatory with antonio Puga, ATUL, RN                            "

## 2023-06-22 NOTE — PATIENT INSTRUCTIONS
Home discharge instructions:  -To make sure each treatment has the best chance of working, follow these steps 2 hours after each treatment   1. Lie on your back for 15 minues   2. Lie on your left side for 15 mintues   3. Lie on your right side for 15 minutes   4. Get up but keep the medication in your bladder for 60 more minutes- for a total of 2 hours   5. Empty your bladder following the directions below (if you have difficulty holding your bladder it is ok to empty your bladder early)  -Wear pad if incontinence is a possibliity  -Wash hands throughly after using the bathroom  -For safety reasons remember to follow these directions for 6 hours after each treatment:  (for 6 hours after your empty the BCG from your bladder)   1. Sit on the toilet seat to urinate   2. Immediately after urinating- add 2 cups of undiluted chlorine bleach to the toilet    3. Close lid and let the bleach sit for 15 minutes each time   4. Drink plenty of water to flush any remaining medication out of your bladder    **These steps will help kill all the BCG bacteria and disinfect the toilet**    Please void BCG at 3:25pm    Take your antibiotic today at 7:30pm and tomorrow morning.    Complete the bleaching process until 9:30pm.       Please call urology triage line at [phone] with fevers or chills, burning with urination, or blood in your urine which lasts more than a 48-72 hours or with any question or concerns.  June 2023 Sunday Monday Tuesday Wednesday Thursday Friday Saturday                       1     2     3       4     5     6     7     8     9     10       11     12    CT CHEST/ABDOMEN/PELVIS W   1:30 PM   (20 min.)   [email or web address]   [facility] [place] 13     14     15     16     17       18     19    RETURN ENDOCRINE   1:45 PM   (30 min.)   Dhara Meza MD   [facility] [place] 20     21     22    LAB  11:30 AM   (15 min.)   [facility]    ONC INFUSION 2 HR (120 MIN)  12:00 PM   (120 min.)    ONC INFUSION NURSE   [facility] 23  [facility] Birth[facility]!     24       25     26     27     28     29    ONC INFUSION 2 HR (120 MIN)  12:00 PM   (120 min.)    ONC INFUSION NURSE   [facility] 30 July 2023 Sunday Monday Tuesday Wednesday Thursday Friday Saturday                                 1       2     3     4     5     6    ONC INFUSION 2 HR (120 MIN)  12:00 PM   (120 min.)    ONC INFUSION NURSE   [facility] 7     8       9     10     11     12     13     14     15       16     17     18    LAB  12:00 PM   (15 min.)    LAB   [facility] [place]    RETURN CCSL  12:15 PM   (30 min.)   Sd Fine MD   [facility] 19     20     21     22       23     24     25     26     27     28     29       30     31

## 2023-06-29 ENCOUNTER — INFUSION THERAPY VISIT (OUTPATIENT)
Dept: ONCOLOGY | Facility: CLINIC | Age: 88
End: 2023-06-29
Attending: UROLOGY
Payer: MEDICARE

## 2023-06-29 VITALS
OXYGEN SATURATION: 94 % | SYSTOLIC BLOOD PRESSURE: 109 MMHG | TEMPERATURE: 98.7 F | DIASTOLIC BLOOD PRESSURE: 70 MMHG | HEART RATE: 77 BPM | RESPIRATION RATE: 12 BRPM

## 2023-06-29 DIAGNOSIS — C66.1 UROTHELIAL CARCINOMA OF RIGHT DISTAL URETER (H): ICD-10-CM

## 2023-06-29 DIAGNOSIS — C68.9 UROTHELIAL CANCER (H): ICD-10-CM

## 2023-06-29 DIAGNOSIS — C68.9 UROTHELIAL CARCINOMA (H): Primary | ICD-10-CM

## 2023-06-29 LAB
ALBUMIN UR-MCNC: NEGATIVE MG/DL
APPEARANCE UR: CLEAR
BILIRUB UR QL STRIP: NEGATIVE
COLOR UR AUTO: NORMAL
GLUCOSE UR STRIP-MCNC: NEGATIVE MG/DL
HGB UR QL STRIP: NEGATIVE
KETONES UR STRIP-MCNC: NEGATIVE MG/DL
LEUKOCYTE ESTERASE UR QL STRIP: NEGATIVE
NITRATE UR QL: NEGATIVE
PH UR STRIP: 6 [PH] (ref 5–7)
SP GR UR STRIP: 1.01 (ref 1–1.03)
UROBILINOGEN UR STRIP-MCNC: NORMAL MG/DL

## 2023-06-29 PROCEDURE — 81003 URINALYSIS AUTO W/O SCOPE: CPT | Performed by: UROLOGY

## 2023-06-29 PROCEDURE — 51720 TREATMENT OF BLADDER LESION: CPT

## 2023-06-29 PROCEDURE — 250N000011 HC RX IP 250 OP 636: Mod: JZ | Performed by: UROLOGY

## 2023-06-29 RX ORDER — LEVOFLOXACIN 500 MG/1
TABLET, FILM COATED ORAL
Qty: 4 TABLET | Refills: 0 | Status: SHIPPED | OUTPATIENT
Start: 2023-06-29 | End: 2023-11-08

## 2023-06-29 RX ORDER — LEVOFLOXACIN 500 MG/1
TABLET, FILM COATED ORAL
Qty: 4 TABLET | Refills: 11 | Status: SHIPPED | OUTPATIENT
Start: 2023-06-29 | End: 2023-12-07

## 2023-06-29 RX ADMIN — BACILLUS CALMETTE-GUERIN 50 MG: 50 POWDER, FOR SUSPENSION INTRAVESICAL at 13:07

## 2023-06-29 ASSESSMENT — PAIN SCALES - GENERAL: PAINLEVEL: NO PAIN (0)

## 2023-06-29 NOTE — PATIENT INSTRUCTIONS
Home discharge instructions:  -To make sure each treatment has the best chance of working, follow these steps 2 hours after each treatment                        1. Lie on your back for 15 minues                        2. Lie on your left side for 15 mintues                        3. Lie on your right side for 15 minutes                        4. Get up but keep the medication  In your bladder for 60 more minutes- for a total of 2 hours                        5. Empty your bladder following the directions below (if you have difficulty holding your bladder it is ok to empty your bladder early)  -Wear pad if incontinence is a possibliity  -Wash hands throughly after using the bathroom  -For safety reasons remember to follow these directions for 6 hours after each treatment:                        1. Sit on the toilet seat to urinate                        2. Immediately after urinating- add 2 cups of undiluted chlorine bleach to the toilet                         3. Close lid and let the bleach sit for 15 minutes each time                        4. Drink plenty of water to flush any remaining medication out of your bladder     **These steps will help kill all the BCG bacteria and disinfect the toilet**        Take your antibiotic today at 7:00 PM and tomorrow morning.        Please call urology triage line at [phone] or [phone] with fevers or chills, burning with urination, or blood in your urine which lasts more than a 48-72 hours or with any question or concerns.

## 2023-06-29 NOTE — PROGRESS NOTES
INFUSION NOTE:  Dimple Oviedo presents today for Bacillus Calmette-Santosh (BCG) in Syringe - Maintenance #5 of 6.  Patient seen by provider today: No   present during visit today: Not Applicable.    -Pt presents to infusion room today feeling well with no new complaints.  -Pt states some mild burning with urination for several hours after last treatment. -Pt denies fever, chills, dysuria, increased urgency or frequency, or gross  hematuria today.        Pre-procedure Assessment:  Dysuria: No  Hematuria: No  Fever/chills: No  Increased urinary urgency: No  Increased urinary frequency: No        Treatment Conditions:   Patient states no concerns or issues at this time. No visible blood noted in today's urine sample. Ok to proceed with treatment.    Latest Reference Range & Units 06/29/23 11:55   Color Urine Colorless, Straw, Light Yellow, Yellow  Light Yellow   Appearance Urine Clear  Clear   Glucose Urine Negative mg/dL Negative   Bilirubin Urine Negative  Negative   Ketones Urine Negative mg/dL Negative   Specific Gravity Urine 1.003 - 1.035  1.010   pH Urine 5.0 - 7.0  6.0   Protein Albumin Urine Negative mg/dL Negative   Urobilinogen mg/dL Normal, 2.0 mg/dL Normal   Nitrite Urine Negative  Negative   Blood Urine Negative  Negative   Leukocyte Esterase Urine Negative  Negative       Procedure:  16F Diehl catheter placed.   Catheter placed without trauma.  Clear/yellow urine drained from bladder.    Patient tolerated instillation without incident.  Patient turned every 15 minutes per protocol: Yes, turning to be completed at clinic per protocol.      Pt was able to dwell with Diehl clamped for 120 minutes.    Patient instructed on the following and verbalized understanding:   Medication to remain in the bladder for 2 hours post instillation: YES  Post instillation side effects and precautions discussed: YES  Levaquin to be taken 6 hours post-instillation and tomorrow morning: YES     Discharge Plan:    Prescription refill for Levaquin.  Discharge instructions reviewed with: Patient.  Patient and/or family verbalized understanding of discharge instructions and all questions answered.  AVS to patient via InstallFree[employer].  Patient will return 7/6 for next appointment.   Patient discharged in stable condition accompanied by: self.  Departure Mode: Ambulatory.

## 2023-07-01 NOTE — TELEPHONE ENCOUNTER
"Called patient and reviewed Dr. Zapien's message.    Patient would like to start a medication \"ASAP\" and asks for Dr. Zapien to send in Zoloft prescription to [facility] pharmacy.    Patient concerned medication will not be ready for  by the time she plans to go back to her residence on 1/27/18.  Currently, she is staying with her son.    Writer informed patient if unable to  medication at [facility] in [place], the [facility] should be able to transfer the script to their location.    Patient verbalized understanding and in agreement with plan.    Med and pharmacy pended.    Dr. Zapien-Please sign if agree.    Thank you!  SHASHANK MurrellN, RN    " Patient seen and examined at the bedside. Agree with the assessment and plan of FREDERICK Purcell.  OK to restart anticoagulation from GI standpoint. Monitor CBC.

## 2023-07-05 NOTE — PROGRESS NOTES
Observation goals:   - Diagnostic tests and consults completed and resulted: Goal not met, SILVESTRE cancelled d/t blood in the urine. CT completed. UA/UC results in chart.   - Vital signs normal or at patient baseline: Goal met. VSS on RA.   - Afib controlled: Goal met. Rhythm is currently sinus rhythm/sinus bradycardia 50's-60's with intermittent PVC's.          albuterol 2.5 mg/3 mL (0.083%) inhalation solution: 2.5 milligram(s) by nebulizer 4 times a day  allopurinol 100 mg oral tablet: 1 tab(s) orally once a day  brimonidine 0.2% ophthalmic solution: 1 each in each affected eye 2 times a day  clonazePAM 0.5 mg oral tablet: 1 tab(s) orally 2 times a day  Depakene 250 mg/5 mL oral liquid: 5 milliliter(s) orally 3 times a day  dexAMETHasone 4 mg oral tablet: 1 tab(s) orally 2 times a day  ezetimibe 10 mg oral tablet: 1 tab(s) orally once a day  famotidine 40 mg/5 mL oral suspension: 5 milliliter(s) orally once a day  FLUoxetine 20 mg oral capsule: 1 tab(s) orally once a day  guaiFENesin 100 mg/5 mL oral liquid: 200 milligram(s) orally every 6 hours  hydrALAZINE 25 mg oral tablet: 1 tab(s) orally 3 times a day  lacosamide 200 mg oral tablet: 1 tab(s) orally 2 times a day  levETIRAcetam 1000 mg oral tablet: 1 tab(s) orally 2 times a day  losartan 50 mg oral tablet: 1 tab(s) orally once a day  mupirocin 2% nasal ointment: 1 gram(s) nasal 2 times a day  timolol maleate 0.25% ophthalmic solution: 1 each in each affected eye 2 times a day  Tussionex [employer] 10 mg-8 mg/5 mL oral suspension, extended release: 5 milliliter(s) orally 2 times a day  Xarelto 20 mg oral tablet: 1 tab(s) orally once a day (at bedtime)   acetaminophen 325 mg oral tablet: 2 tab(s) orally every 6 hours As needed Temp greater or equal to 38C (100.4F), Mild Pain (1 - 3)  allopurinol 100 mg oral tablet: 1 tab(s) orally once a day  aluminum hydroxide-magnesium hydroxide 200 mg-200 mg/5 mL oral suspension: 30 milliliter(s) orally every 4 hours As needed Dyspepsia  amLODIPine 5 mg oral tablet: 1 tab(s) orally once a day  brimonidine 0.2% ophthalmic solution: 1 each in each affected eye 2 times a day  Depakene 250 mg/5 mL oral liquid: 5 milliliter(s) orally 3 times a day  dexAMETHasone 4 mg oral tablet: 1 tab(s) orally 2 times a day  famotidine 40 mg/5 mL oral suspension: 5 milliliter(s) orally once a day  guaiFENesin 100 mg/5 mL oral liquid: 200 milligram(s) orally every 6 hours as needed for  cough  hydrALAZINE 25 mg oral tablet: 1 tab(s) orally 3 times a day  lacosamide 200 mg oral tablet: 1 tab(s) orally 2 times a day  levETIRAcetam 1000 mg oral tablet: 1 tab(s) orally 2 times a day  losartan 100 mg oral tablet: 1 tab(s) orally once a day  melatonin 3 mg oral tablet: 1 tab(s) orally once a day (at bedtime) As needed Insomnia  Multiple Vitamins with Minerals oral liquid: 15 milliliter(s) orally once a day  timolol maleate 0.25% ophthalmic solution: 1 each in each affected eye 2 times a day  Xarelto 20 mg oral tablet: 1 tab(s) orally once a day (at bedtime)

## 2023-07-06 ENCOUNTER — INFUSION THERAPY VISIT (OUTPATIENT)
Dept: ONCOLOGY | Facility: CLINIC | Age: 88
End: 2023-07-06
Attending: UROLOGY
Payer: MEDICARE

## 2023-07-06 VITALS
SYSTOLIC BLOOD PRESSURE: 122 MMHG | OXYGEN SATURATION: 94 % | DIASTOLIC BLOOD PRESSURE: 74 MMHG | HEART RATE: 73 BPM | RESPIRATION RATE: 14 BRPM | TEMPERATURE: 97.6 F

## 2023-07-06 DIAGNOSIS — E05.00 GRAVES DISEASE: ICD-10-CM

## 2023-07-06 DIAGNOSIS — C68.9 UROTHELIAL CANCER (H): ICD-10-CM

## 2023-07-06 DIAGNOSIS — C66.1 UROTHELIAL CARCINOMA OF RIGHT DISTAL URETER (H): ICD-10-CM

## 2023-07-06 DIAGNOSIS — C68.9 UROTHELIAL CARCINOMA (H): Primary | ICD-10-CM

## 2023-07-06 LAB
ALBUMIN UR-MCNC: NEGATIVE MG/DL
APPEARANCE UR: CLEAR
BILIRUB UR QL STRIP: NEGATIVE
COLOR UR AUTO: NORMAL
GLUCOSE UR STRIP-MCNC: NEGATIVE MG/DL
HGB UR QL STRIP: NEGATIVE
KETONES UR STRIP-MCNC: NEGATIVE MG/DL
LEUKOCYTE ESTERASE UR QL STRIP: NEGATIVE
NITRATE UR QL: NEGATIVE
PH UR STRIP: 5.5 [PH] (ref 5–7)
SP GR UR STRIP: 1.01 (ref 1–1.03)
UROBILINOGEN UR STRIP-MCNC: NORMAL MG/DL

## 2023-07-06 PROCEDURE — 51720 TREATMENT OF BLADDER LESION: CPT

## 2023-07-06 PROCEDURE — 88120 CYTP URNE 3-5 PROBES EA SPEC: CPT | Mod: TC

## 2023-07-06 PROCEDURE — 88120 CYTP URNE 3-5 PROBES EA SPEC: CPT | Mod: 26 | Performed by: PATHOLOGY

## 2023-07-06 PROCEDURE — 250N000011 HC RX IP 250 OP 636: Mod: JZ | Performed by: UROLOGY

## 2023-07-06 PROCEDURE — 88112 CYTOPATH CELL ENHANCE TECH: CPT | Mod: TC

## 2023-07-06 PROCEDURE — 88112 CYTOPATH CELL ENHANCE TECH: CPT | Mod: 26 | Performed by: PATHOLOGY

## 2023-07-06 PROCEDURE — 81003 URINALYSIS AUTO W/O SCOPE: CPT | Performed by: UROLOGY

## 2023-07-06 RX ADMIN — BACILLUS CALMETTE-GUERIN 50 MG: 50 POWDER, FOR SUSPENSION INTRAVESICAL at 13:28

## 2023-07-06 ASSESSMENT — PAIN SCALES - GENERAL: PAINLEVEL: NO PAIN (0)

## 2023-07-06 NOTE — PROGRESS NOTES
INFUSION NOTE:  Dimple Oviedo presents today for Bacillus Calmette-Santosh (BCG) in Syringe - Maintenance #6 of 6.  Patient seen by provider today: No   present during visit today: Not Applicable.     -Pt presents to infusion room today feeling well with no new complaints.  -Pt states some mild burning with urination for several hours after last treatment.   -Pt denies fever, chills, dysuria, increased urgency or frequency, or gross  hematuria today.          Pre-procedure Assessment:  Dysuria: No  Hematuria: No  Fever/chills: No  Increased urinary urgency: No  Increased urinary frequency: No           Treatment Conditions:   Patient states no concerns or issues at this time. No visible blood noted in today's urine sample. Ok to proceed with treatment.       Latest Reference Range & Units 07/06/23 12:03   Color Urine Colorless, Straw, Light Yellow, Yellow  Straw   Appearance Urine Clear  Clear   Glucose Urine Negative mg/dL Negative   Bilirubin Urine Negative  Negative   Ketones Urine Negative mg/dL Negative   Specific Gravity Urine 1.003 - 1.035  1.007   pH Urine 5.0 - 7.0  5.5   Protein Albumin Urine Negative mg/dL Negative   Urobilinogen mg/dL Normal, 2.0 mg/dL Normal   Nitrite Urine Negative  Negative   Blood Urine Negative  Negative   Leukocyte Esterase Urine Negative  Negative        Procedure:  16F Diehl catheter placed.   Catheter placed without trauma.  Clear/yellow urine drained from bladder.    Patient tolerated instillation without incident.  Patient turned every 15 minutes per protocol: Yes, turning to be completed at clinic per protocol.      Pt was able to dwell with Diehl clamped for 120 minutes.     Patient instructed on the following and verbalized understanding:   Medication to remain in the bladder for 2 hours post instillation: YES  Post instillation side effects and precautions discussed: YES  Levaquin to be taken 6 hours post-instillation and tomorrow morning: YES     Discharge  Plan:   Prescription refill for Levaquin.  Discharge instructions reviewed with: Patient.  Patient and/or family verbalized understanding of discharge instructions and all questions answered.  AVS to patient via MyTimeT.  Patient will return 7/18 for provider visit.   Patient discharged in stable condition accompanied by: self.  Departure Mode: Ambulatory.

## 2023-07-06 NOTE — PATIENT INSTRUCTIONS
Home discharge instructions:  -To make sure each treatment has the best chance of working, follow these steps 2 hours after each treatment                        1. Lie on your back for 15 minues                        2. Lie on your left side for 15 mintues                        3. Lie on your right side for 15 minutes                        4. Get up but keep the medication  In your bladder for 60 more minutes- for a total of 2 hours                        5. Empty your bladder following the directions below (if you have difficulty holding your bladder it is ok to empty your bladder early)  -Wear pad if incontinence is a possibliity  -Wash hands throughly after using the bathroom  -For safety reasons remember to follow these directions for 6 hours after each treatment:                        1. Sit on the toilet seat to urinate                        2. Immediately after urinating- add 2 cups of undiluted chlorine bleach to the toilet                         3. Close lid and let the bleach sit for 15 minutes each time                        4. Drink plenty of water to flush any remaining medication out of your bladder     **These steps will help kill all the BCG bacteria and disinfect the toilet**        Take your antibiotic today at 7:30 PM and tomorrow morning.        Please call urology triage line at [phone] or [phone] with fevers or chills, burning with urination, or blood in your urine which lasts more than a 48-72 hours or with any question or concerns.      Latest Reference Range & Units 07/06/23 12:03   Color Urine Colorless, Straw, Light Yellow, Yellow  Straw   Appearance Urine Clear  Clear   Glucose Urine Negative mg/dL Negative   Bilirubin Urine Negative  Negative   Ketones Urine Negative mg/dL Negative   Specific Gravity Urine 1.003 - 1.035  1.007   pH Urine 5.0 - 7.0  5.5   Protein Albumin Urine Negative mg/dL Negative   Urobilinogen mg/dL Normal, 2.0 mg/dL Normal   Nitrite Urine Negative   Negative   Blood Urine Negative  Negative   Leukocyte Esterase Urine Negative  Negative

## 2023-07-18 ENCOUNTER — LAB (OUTPATIENT)
Dept: LAB | Facility: CLINIC | Age: 88
End: 2023-07-18
Payer: MEDICARE

## 2023-07-18 ENCOUNTER — ONCOLOGY VISIT (OUTPATIENT)
Dept: ONCOLOGY | Facility: CLINIC | Age: 88
End: 2023-07-18
Attending: INTERNAL MEDICINE
Payer: MEDICARE

## 2023-07-18 VITALS
OXYGEN SATURATION: 95 % | SYSTOLIC BLOOD PRESSURE: 137 MMHG | RESPIRATION RATE: 16 BRPM | BODY MASS INDEX: 37.89 KG/M2 | HEART RATE: 67 BPM | DIASTOLIC BLOOD PRESSURE: 66 MMHG | TEMPERATURE: 98.1 F | WEIGHT: 175.1 LBS

## 2023-07-18 DIAGNOSIS — E05.00 GRAVES DISEASE: ICD-10-CM

## 2023-07-18 DIAGNOSIS — C68.9 UROTHELIAL CARCINOMA (H): Primary | ICD-10-CM

## 2023-07-18 DIAGNOSIS — C66.1 UROTHELIAL CARCINOMA OF RIGHT DISTAL URETER (H): ICD-10-CM

## 2023-07-18 DIAGNOSIS — R91.8 PULMONARY NODULES: ICD-10-CM

## 2023-07-18 LAB
ALBUMIN SERPL BCG-MCNC: 4.2 G/DL (ref 3.5–5.2)
ALP SERPL-CCNC: 82 U/L (ref 35–104)
ALT SERPL W P-5'-P-CCNC: 11 U/L (ref 0–50)
ANION GAP SERPL CALCULATED.3IONS-SCNC: 9 MMOL/L (ref 7–15)
AST SERPL W P-5'-P-CCNC: 22 U/L (ref 0–45)
BASOPHILS # BLD AUTO: 0.1 10E3/UL (ref 0–0.2)
BASOPHILS NFR BLD AUTO: 1 %
BILIRUB SERPL-MCNC: 0.5 MG/DL
BUN SERPL-MCNC: 19 MG/DL (ref 8–23)
CALCIUM SERPL-MCNC: 9.7 MG/DL (ref 8.2–9.6)
CHLORIDE SERPL-SCNC: 100 MMOL/L (ref 98–107)
CREAT SERPL-MCNC: 1.05 MG/DL (ref 0.51–0.95)
DEPRECATED HCO3 PLAS-SCNC: 28 MMOL/L (ref 22–29)
EOSINOPHIL # BLD AUTO: 0.1 10E3/UL (ref 0–0.7)
EOSINOPHIL NFR BLD AUTO: 1 %
ERYTHROCYTE [DISTWIDTH] IN BLOOD BY AUTOMATED COUNT: 13.4 % (ref 10–15)
GFR SERPL CREATININE-BSD FRML MDRD: 50 ML/MIN/1.73M2
GLUCOSE SERPL-MCNC: 144 MG/DL (ref 70–99)
HCT VFR BLD AUTO: 40.3 % (ref 35–47)
HGB BLD-MCNC: 12.9 G/DL (ref 11.7–15.7)
IMM GRANULOCYTES # BLD: 0 10E3/UL
IMM GRANULOCYTES NFR BLD: 0 %
LYMPHOCYTES # BLD AUTO: 1.2 10E3/UL (ref 0.8–5.3)
LYMPHOCYTES NFR BLD AUTO: 19 %
MCH RBC QN AUTO: 31 PG (ref 26.5–33)
MCHC RBC AUTO-ENTMCNC: 32 G/DL (ref 31.5–36.5)
MCV RBC AUTO: 97 FL (ref 78–100)
MONOCYTES # BLD AUTO: 0.5 10E3/UL (ref 0–1.3)
MONOCYTES NFR BLD AUTO: 8 %
NEUTROPHILS # BLD AUTO: 4.5 10E3/UL (ref 1.6–8.3)
NEUTROPHILS NFR BLD AUTO: 71 %
NRBC # BLD AUTO: 0 10E3/UL
NRBC BLD AUTO-RTO: 0 /100
PLATELET # BLD AUTO: 209 10E3/UL (ref 150–450)
POTASSIUM SERPL-SCNC: 4.7 MMOL/L (ref 3.4–5.3)
PROT SERPL-MCNC: 7.2 G/DL (ref 6.4–8.3)
RBC # BLD AUTO: 4.16 10E6/UL (ref 3.8–5.2)
SODIUM SERPL-SCNC: 137 MMOL/L (ref 136–145)
WBC # BLD AUTO: 6.4 10E3/UL (ref 4–11)

## 2023-07-18 PROCEDURE — G0463 HOSPITAL OUTPT CLINIC VISIT: HCPCS | Performed by: INTERNAL MEDICINE

## 2023-07-18 PROCEDURE — 99213 OFFICE O/P EST LOW 20 MIN: CPT | Performed by: INTERNAL MEDICINE

## 2023-07-18 PROCEDURE — 36415 COLL VENOUS BLD VENIPUNCTURE: CPT | Performed by: PATHOLOGY

## 2023-07-18 PROCEDURE — 80053 COMPREHEN METABOLIC PANEL: CPT | Performed by: PATHOLOGY

## 2023-07-18 PROCEDURE — 85025 COMPLETE CBC W/AUTO DIFF WBC: CPT | Performed by: PATHOLOGY

## 2023-07-18 ASSESSMENT — PAIN SCALES - GENERAL: PAINLEVEL: NO PAIN (0)

## 2023-07-18 NOTE — PROGRESS NOTES
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    HISTORY OF PRESENT ILLNESS:  Ms. 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 Her oncologic history is as under.    Ms. Oviedo is doing well. She denies any complains suggestive of recurrent disease.     She has marked bilateral pedal edema which is her primary complain. She has this since prior to her nephroureterectomy. She has been following with lymphedema therapy.      ONCOLOGIC HISTORY:  1. 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   - Jan 2018 to September 2018- Persistent gross hematuria and underwent multiple procedures.    - 2/19/18 and 4/3/18- cystoscopies with bladder biopsies  which were negative for bladder tumor  - 1/17/18, 3/8/18, 7/26/18, 9/10/18- Multiple urine cytologies have come back positive by FISH for high-grade transitional cell carcinoma  - 9/10/18- Underwent a bilateral cystoureteroscopy with biopsy and brushing that showed  right upper tract cytology suspicious for high-grade urothelial ca. Right ureter brushing negative from that day. Left upper tract negative.  - 9/10/18- CT A/P without contrast showed new small bilateral pleural effusions and interstitial pulmonary edema but no evidence of locoregional or metastatic disease.  - 9/12/18- Presented to ED with oliguria, CRESENCIO, and mild hydronephrosis bilaterally on CT scan and underwent bilateral ureteral stent placment  - 11/13/2018- Right robotic nephroureterectomy, excision of sandip-caval mass and LN excision by Stuart King. Pathology showed \"Right nephroureterectomy: Invasive high grade urothelial " "carcinoma measuring 3.5 cm, located in renal pelvis and invading through renal parenchyma into perinephric fat. Urothelial carcinoma in-situ present. Margins free of tumor. Ana-caval mass: Metastatic urothelial carcinoma involving one lymph node with at least 1 cm tumor deposit. Pericaval Extranodal Extension present. Five additional benign pericaval lymph nodes. Pathologic stage: pT4N1 (1 of 6 lymph nodes).\"  - 12/11/18- PET/CT whole body demonstrated significant residual FDG avid disease. For example, \"there is an FDG avid ill-defined pericaval mass immediately medial to the surgical clips measuring approximately 2.0 x 1.4 cm, with max SUV measuring up to12.2, see series 4 image 21. Additional FDG avid retrocaval and interaortocaval lymphadenopathy are noted.There is a 1.2 cm nodule in the right hemipelvis posterior lateral tothe bladder with max SUV measuring 8.3, see series 4 image 77. The bladder is incompletely distended.\" No suspicious thoracic or bone uptake.  - 12/12/18- Started adjuvant chemotherapy (carbo+gem) per POUT trial. Cycle 2 - 1/2/19. Cycle 3 - 1/23/19. Cycle 4 - 02/13/19.  - 1/22/19 - CT C/A/P with contrast compared with prior PET/CT: \"1. New well-circumscribed soft tissue pulmonary nodules of the right lower lobe and left upper lobe. Findings could be infectious, inflammatory, or represent metastatic disease. Continued attention on follow-up recommended. Postoperative changes of right nephrectomy. No evidence for local recurrence in the present study.\"  - 3/1/2019- CT C/A/P with contrast - \"1. Bilateral pulmonary nodules measuring up to 4 mm in the right lower lobe, previously measuring 8 mm. No new or enlarging pulmonary nodules. 2. No convincing evidence for metastatic disease in the abdomen, pelvis and bones.\"  - 6/3/2019- CT C/A/P with contrast - \"1. Surgical changes of right nephrectomy without evidence of residual or recurrent disease on this noncontrast exam.  Consider contrast enhanced " "examination on follow-up. 2. No evidence of metastatic disease in the abdomen, or pelvis on noncontrast CT.  Scattered tiny pulmonary nodules in the chest are not significantly changed.  Previously described prominent right lower lobe pulmonary nodule (previously measuring up to 8 mm) is no longer visualized. 3. Stable bilateral pulmonary nodules measuring maximally 4 mm.\"  - 09/11/19: CT C/A/P without contrast - \"1. Stable exam without evidence convincing for new disease. 2. Stable pulmonary nodules. 3. 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 4. Stable heterogeneous enlargement of the thyroid with underlying nodules suggested.\"  - 12/4/19: CT C/A/P without contrast - \"No acute change since prior exam. No evidence of recurrent or metastatic disease. Tiny lung nodules are stable. Prior right nephrectomy. Left renal artery aneurysm is stable.\"  - 04/08/20, 7/27/20: CT C/A/P with contrast - GABRIELLE.  - 01/12/21: CT C/A/P with contrast - \"1.  Slight increased size of a 6 mm left upper lobe nodule and new 4 mm linear opacity along the right major fissure. These are indeterminate but could represent progression of metastatic disease. 2.  Slight increased size of mildly enlarged mediastinal and hilar lymph nodes. 3.  Mild pulmonary edema. 4.  No recurrent or metastatic disease in the abdomen and pelvis. 5.  Mild stranding around the urinary bladder dome may be related to cystitis. Punctate focus of gas within the urinary bladder either secondary to infection or instrumentation. 6.  Enlarged multinodular thyroid gland.\"    2. Non-muscle invasive bladder cancer:  - 10/3/19: Cystoscopy showed a small area of erythema on retroflexion, possibly pre-existing or due to retroflexion of the scope. Urine cytology from that time showed cells suspicious for malignancy.   - 1/13/20: Bladder biopsy showed high grade urothelial carcinoma in-situ  - 2/12/20-3/18/20: Intravesical BCG therapy  - 7/24/20 and last cystoscopy was " on the same day. It was negative. However, urine cytology was positive for high-grade urothelial carcinoma.   - 11/25/20-12/10/2020: 3 weekly doses of intravesical BCG 50mg.   - 12/10/20: Cytology suspicious and FISH urovysion positive for TCC.    REVIEW OF SYSTEMS: 14 point ROS negative other than the symptoms noted above in the HPI.    PAST MEDICAL AND SURGICAL HISTORY:   Past Medical History:   Diagnosis Date     CRESENCIO (acute kidney injury) (H) 9/11/2018     Arthritis      Asthma 2/9/2022     Calculus of kidney      Chemotherapy-induced neutropenia (H) 3/6/2019     Congestive heart failure (H)      Esophageal reflux      GERD (gastroesophageal reflux disease)      Hyperlipidemia LDL goal <130 5/9/2010     Malignant melanoma of skin of trunk, except scrotum (H)      Nonspecific abnormal finding     has living will 2004 -      Nontoxic multinodular goiter     no further eval /tx rec per pt     Osteopenia      Other psoriasis      Personal history of colonic polyps      PMR (polymyalgia rheumatica) (H)      Restless legs syndrome 7/11/2018     Stress-induced cardiomyopathy      Undiagnosed cardiac murmurs      Unspecified constipation      Unspecified essential hypertension      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     Past Surgical History:   Procedure Laterality Date     ARTHROPLASTY KNEE Right 11/9/2020    Procedure: ARTHROPLASTY, KNEE, TOTAL, RIGHT;  Surgeon: Edward Otero MD;  [facility]     BIOPSY       COLONOSCOPY  2014     COMBINED CYSTOSCOPY, INSERT STENT URETER(S) Bilateral 9/12/2018    Procedure: COMBINED CYSTOSCOPY, INSERT STENT URETER(S);  Cystoscopy Bilateral ureteral Stent Placement.;  Surgeon: Justin Henry MD;  [facility]     COMBINED CYSTOSCOPY, RETROGRADES, URETEROSCOPY, INSERT STENT Bilateral 4/3/2018    Procedure: COMBINED CYSTOSCOPY, RETROGRADES, URETEROSCOPY, INSERT STENT;;  Surgeon: Stuart King MD;  [facility]     COMBINED CYSTOSCOPY, RETROGRADES,  URETEROSCOPY, INSERT STENT Bilateral 9/10/2018    Procedure: COMBINED CYSTOSCOPY, RETROGRADES, URETEROSCOPY, INSERT STENT;  Cystoscopy, Bilateral Ureteroscopy, Bladder Biopsies, Retrogram Pyelograms, Ureteral Washings and brushings, cysview;  Surgeon: Stuart King MD;  [facility]     COMBINED CYSTOSCOPY, RETROGRADES, URETEROSCOPY, INSERT STENT Left 8/26/2020    Procedure: Cystoscopy, Left Ureteral Wash, Left ureteral Retrograde Pyelogram;  Surgeon: Justin Henry MD;  [facility]     CYSTOSCOPY, BIOPSY BLADDER INSTILL OPTICAL AGENT N/A 4/3/2018    Procedure: CYSTOSCOPY, BIOPSY BLADDER INSTILL OPTICAL AGENT;  Cystoscopy, Blue Light Cystoscopy, Bladder Biopsies, Bilateral Selective ureteral washings for Cytology, Bilateral Retrograde Pyelograms, Bilateral Ureteroscopy;  Surgeon: Stuart King MD;  [facility]     CYSTOSCOPY, BIOPSY BLADDER, COMBINED N/A 2/19/2018    Procedure: COMBINED CYSTOSCOPY, BIOPSY BLADDER;  Cystoscopy, Bladder Biopsy;  Surgeon: Kenna La MD;  [facility]     CYSTOSCOPY, BIOPSY BLADDER, COMBINED N/A 8/26/2020    Procedure: Cystoscopy, biopsy bladder, combined;  Surgeon: Justin Henry MD;  [facility]     CYSTOSCOPY, FULGURATE BLADDER TUMOR, COMBINED N/A 1/13/2020    Procedure: CYSTOSCOPY, WITH  bladder biopsies and fulguration;  Surgeon: Stuart King MD;  [facility]     CYSTOSCOPY, REMOVE STENT(S), COMBINED  11/13/2018    Procedure: Flexible Cystoscopy with Stent Removal;  Surgeon: Stuart King MD;  [facility]     DAVINCI LYMPHADENECTOMY N/A 11/13/2018    Procedure: Davinci Lymphadenectomy ;  Surgeon: Stuart King MD;  [facility]     DAVINCI NEPHROURETERECTOMY N/A 11/13/2018    Procedure: Right DaVinci Assisted Nephroureterectomy;  Surgeon: Stuart King MD;  [facility]     ENDOSCOPIC ULTRASOUND LOWER GASTROINTESTIONAL TRACT (GI) N/A 10/30/2015     Procedure: ENDOSCOPIC ULTRASOUND LOWER GASTROINTESTIONAL TRACT (GI);  Surgeon: Daniel Jean-Baptiste MD;  [facility]     EYE SURGERY  12/4/17     INSERT PORT VASCULAR ACCESS Right 12/19/2018    Procedure: Chest Port Placement - right;  Surgeon: Stuart Chavez PA-C;  [facility]     IR CHEST PORT PLACEMENT > 5 YRS OF AGE  12/19/2018     IR PORT REMOVAL RIGHT  6/26/2019     LAPAROSCOPIC CHOLECYSTECTOMY WITH CHOLANGIOGRAMS N/A 11/1/2015    Procedure: LAPAROSCOPIC CHOLECYSTECTOMY WITH CHOLANGIOGRAMS;  Surgeon: Tonie Warren MD;  [facility]     REMOVE PORT VASCULAR ACCESS Right 6/26/2019    Procedure: Right Port Removal;  Surgeon: Froilan Irizarry PA-C;  [facility]     SURGICAL HISTORY OF -   1996    malignant melanoma     SURGICAL HISTORY OF -   1968    thyroid nodule     SURGICAL HISTORY OF -       D & C     ZZC NONSPECIFIC PROCEDURE  2005    colonoscopy polyp repeat 2010     SOCIAL HISTORY:   Social History     Tobacco Use     Smoking status: Never     Smokeless tobacco: Never   Vaping Use     Vaping Use: Never used   Substance Use Topics     Alcohol use: No     Alcohol/week: 0.0 standard drinks of alcohol     Comment: rare     Drug use: No     FAMILY HISTORY:   Family History   Problem Relation Age of Onset     Cancer Father         dec - esophageal and laryngeal     Heart Disease Mother      Respiratory Mother         dec     Breast Cancer Daughter      Other Cancer Daughter      Thyroid Disease Daughter      Asthma Daughter      Hyperlipidemia Son      Diabetes Son      Anesthesia Reaction No family hx of      Deep Vein Thrombosis (DVT) No family hx of      ALLERGIES:   No Known Allergies  CURRENT MEDICATIONS:   Current Outpatient Medications:      alendronate (FOSAMAX) 70 MG tablet, TAKE 1 TABLET EVERY 7 DAYS AT LEAST 60 MINUTES BEFORE BREAKFAST AS DIRECTED., Disp: 12 tablet, Rfl: 2     diltiazem ER (DILT-XR) 180 MG 24 hr capsule, Take 1 capsule (180 mg) by mouth daily,  Disp: 90 capsule, Rfl: 3     ferrous sulfate 325 (65 Fe) MG TBEC EC tablet, Take 325 mg by mouth every morning , Disp: , Rfl:      furosemide (LASIX) 20 MG tablet, TAKE 1 TABLET TWICE DAILY IN THE MORNING  AND  AFTER  LUNCH, Disp: 180 tablet, Rfl: 3     irbesartan (AVAPRO) 300 MG tablet, Take 1 tablet (300 mg) by mouth daily, Disp: 90 tablet, Rfl: 3     levofloxacin (LEVAQUIN) 500 MG tablet, Take 1 tablet by mouth, Disp: 4 tablet, Rfl: 0     levofloxacin (LEVAQUIN) 500 MG tablet, Take one tablet six hours after treatment and one tablet morning after treatment, Disp: 4 tablet, Rfl: 11     levofloxacin (LEVAQUIN) 500 MG tablet, Take 1 tablet  By mouth 2 hours before BCG infusion and 1 tablet by mouth 8 hours after BCG infusion for 3 treatments, Disp: 6 tablet, Rfl: 0     lovastatin (MEVACOR) 40 MG tablet, TAKE 1 TABLET AT BEDTIME (SCHEDULE ANNUAL VISIT), Disp: 90 tablet, Rfl: 0     methimazole (TAPAZOLE) 5 MG tablet, TAKE 1 TABLET ALTERNATING WITH 1/2 TABLET EVERY OTHER DAY, Disp: 66 tablet, Rfl: 3     Omega-3 Fatty Acids (FISH OIL) 500 MG CAPS, Take 1 capsule by mouth every morning , Disp: , Rfl:      omeprazole (PRILOSEC) 20 MG DR capsule, Take 1 capsule (20 mg) by mouth daily, Disp: 90 capsule, Rfl: 3     propranolol ER (INDERAL LA) 60 MG 24 hr capsule, TAKE 1 CAPSULE EVERY DAY, Disp: 90 capsule, Rfl: 3     RESTASIS 0.05 % ophthalmic emulsion, , Disp: , Rfl:      rivaroxaban ANTICOAGULANT (XARELTO) 10 MG TABS tablet, Take 1 tablet (10 mg) by mouth daily (with dinner), Disp: 30 tablet, Rfl: 0     rOPINIRole (REQUIP) 0.25 MG tablet, TAKE 1 TABLET IN THE AFTERNOON AND TAKE 2 TABLETS EVERY NIGHT, Disp: 270 tablet, Rfl: 3     sertraline (ZOLOFT) 100 MG tablet, Take 1 tablet (100 mg) by mouth every morning, Disp: 90 tablet, Rfl: 3     traZODone (DESYREL) 50 MG tablet, TAKE 1/2 TABLET AT BEDTIME, Disp: 45 tablet, Rfl: 3    Current Facility-Administered Medications:      lidocaine (XYLOCAINE) 2 % external gel, , Urethral,  Anton, Justin Henry MD    PHYSICAL EXAMINATION:  Deferred. Phone visit due to COVID.    LABORATORY DATA:   Recent Labs   Lab Test 07/18/23  1158 06/12/23  1332 02/27/23  1306 12/09/22  1029 12/09/22  1000 10/08/22  1340 09/12/22  1036     --  138  --  139 133* 135   POTASSIUM 4.7  --  4.8  --  4.4 4.0 4.3   CHLORIDE 100  --  100  --  101 96* 102   CO2 28  --  27  --  28 26 27   ANIONGAP 9  --  11  --  10 11 6   BUN 19.0  --  18.5  --  18.1 19.0 21   CR 1.05* 0.9 0.96* 1.0 1.05* 0.91 0.99   *  --  102*  --  132* 104* 116*   SHREYA 9.7*  --  9.8  --  9.4 9.6 9.2     Recent Labs   Lab Test 09/02/21  0644 09/01/21  1505 02/03/19  2102 12/12/18  1050 01/16/18  1247 01/16/18  0646 12/13/17  2236 04/18/16  0905 11/02/15  0912 11/01/15  0727 10/31/15  0810   MAG 2.4* 2.3 1.8 2.1 2.1   < > 2.0  --   --  2.0  --    PHOS  --   --   --   --   --   --  2.4* 3.2 3.2 2.1* 2.4*    < > = values in this interval not displayed.     Recent Labs   Lab Test 07/18/23  1158 02/27/23  1306 12/09/22  1000 10/08/22  1340 09/05/22  1446   WBC 6.4 8.4 7.7 10.6 8.3   HGB 12.9 12.8 13.3 12.0 11.6*    192 240 228 210   MCV 97 97 97 96 96   NEUTROPHIL 71 73 78 72 75     Recent Labs   Lab Test 07/18/23  1158 02/27/23  1306 12/09/22  1000 09/05/22  1446 05/31/22  2342 12/09/21  2356 11/19/21  0911 08/11/21  1031 09/11/18  0612 07/17/18  1346   BILITOTAL 0.5 0.4 0.5   < > 0.4   < > 0.5 0.4   < >  --    ALKPHOS 82 92 91   < > 103   < > 98 88   < >  --    ALT 11 19 19   < > 21   < > 23 17   < >  --    AST 22  --  27  --  20   < > 15 14   < >  --    ALBUMIN 4.2 3.9 4.2   < > 3.7   < > 3.4 3.5   < >  --    LDH  --   --   --   --   --   --  180 176  --  204    < > = values in this interval not displayed.     TSH   Date Value Ref Range Status   06/22/2023 2.39 0.30 - 4.20 uIU/mL Final   12/09/2022 2.62 0.30 - 4.20 uIU/mL Final   05/20/2022 2.90 0.40 - 4.00 mU/L Final   09/02/2021 1.24 0.40 - 4.00 mU/L Final   07/05/2021 1.68 0.40 -  4.00 mU/L Final   05/27/2021 1.93 0.40 - 4.00 mU/L Final   01/27/2021 2.42 0.40 - 4.00 mU/L Final     No results for input(s): CEA in the last [id number] hours.  Results for orders placed or performed in visit on 06/12/23   CT Chest/Abdomen/Pelvis w Contrast    Narrative    EXAM: CT CHEST/ABDOMEN/PELVIS W CONTRAST  LOCATION: [facility]  DATE/TIME: 6/12/2023 1:57 PM CDT    INDICATION: High grade, muscle invasive, transitional cell carcinoma of right renal pelvis, stage IV ([id number]): surveillance scans  COMPARISON: Multiple exams most recent 02/27/2023 and 12/09/2022 02/27/2023.  TECHNIQUE: CT scan of the chest, abdomen, and pelvis was performed following injection of IV contrast. Multiplanar reformats were obtained. Dose reduction techniques were used.   CONTRAST: Isovue 370 96cc    FINDINGS:   LUNGS AND PLEURA: Nodular thickening of the left pleura in the lower lobe posteriorly is more prominent than prior and indeterminate, measuring 1 cm in thickness on this exam versus 0.5 cm previously (series 3 image 158). Adjacent subpleural nodule in the posterior medial left lower lobe measuring 9 x 5 mm is also larger since older examinations (series 4 image 138). Unchanged 5 mm nodule in the left upper lobe dating back to 08/11/2021 (series 4 image 90). Calcified granulomas No pleural effusion.    MEDIASTINUM/AXILLAE: Unchanged prominent right paratracheal, left hilar and right cardiophrenic lymph nodes. Enlarged multinodular thyroid which has been assessed by ultrasound. Moderate hiatal hernia.    CORONARY ARTERY CALCIFICATION: Mild.    HEPATOBILIARY: No focal liver lesion. Cholecystectomy. Extrahepatic biliary prominence likely related to age and postcholecystectomy state.    PANCREAS: Normal.    SPLEEN: Normal.    ADRENAL GLANDS: Normal.    KIDNEYS/BLADDER: Right nephrectomy. No evidence of local recurrence. Nonobstructing unchanged 4 mm left intrarenal calculus. Bladder  decompressed.    BOWEL: Normal.    LYMPH NODES: Normal.    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    PELVIC ORGANS: Normal.    MUSCULOSKELETAL: Normal.      Impression    IMPRESSION:  1.  Nodular thickening of the pleura in the left lower lobe and adjacent subpleural left lower lobe nodule are larger. Pleural metastasis not excluded. Consider PET/CT for further evaluation.  2.  No recurrent or metastatic disease in the abdomen and pelvis.  3.  Right nephrectomy.  4.  Nonobstructing left intrarenal calculus.  5.  Stable left renal artery aneurysm.     *Note: Due to a large number of results and/or encounters for the requested time period, some results have not been displayed. A complete set of results can be found in Results Review.         ASSESSMENT AND PLAN: Ms. Oviedo is a delightful 89 year old lady with localized stage IV ([id number]) high-grade, muscle-invasive transitional cell carcinoma of right renal pelvis, status post right robotic nephroureterectomy and 4 cycles of adjuvant carbo/gem; as well as NMIBC.    1. Upper tract urothelial cancer:   - She completed 4 cycles of adjuvant carboplatin plus gemcitabine chemotherapy per POUT trial.    She completed adjuvant chemotherapy in Feb 2019 over 4 yrs ago  - No residual side effects or evidence of recurrence.  - Reviewed restaging CT scan from June 12th, 2023; there is no clear evidence of disease recurrence in abd/pelvis.   She continues to have pulmonary nodules which remain stable except for growth in one of the pulmonary nodule    She also has pleural nodule in left lower lobe.    Lung findings are non-specific and these are also not responsible for her shortness of breath    - I reviewed option of PET-CT scans vs biopsy. The definitive diagnosis would only come after a biopsy. However, PET-CT scan has much higher sensitivity and it would change level of suspicion for the biopsy.    - I will  follow her in 2 months with labs and restaging PET-CT scan    2. High grade urothelial carcinoma in situ:  - Bx proven carcinoma in situ in 1/2020, completed the first round of intravesical BCG treatment 2/2020-3/2020 and second in 11/2020-12/2020.  - She was initially followed by Dr. King and now is under care of Dr. Froilan Henry.   - She did not have urine cytology done and I will get it after this visit.   - She will continue on 3 weekly BCG every 3 months as part of her maintenance along with follow up cystoscopies    3. Chemo induced anemia and thrombocytopenia:  - Resolved.      4. Persistent HERMAN:  - She follows Dr. Griffith in Cardiology for her h/o moderate AORTIC STENOSIS, CHF, and Afib now with worsening SOA and lymphedema with no cancer-related etiology evident.     Return to clinic in 6 months with restaging scans.    All of the above was explained to the patient in lay language and several questions were answered. They are in agreement with the plan.     25 minutes spent on the date of the encounter doing chart review, history and exam, documentation and further activities as noted above     Sd Fine    Hematologist and Medical Oncologist  [facility]

## 2023-07-18 NOTE — LETTER
7/18/2023         RE: Dimple Oviedo  [address]  [facility] [id number]        Dear Colleague,    Thank you for referring your patient, Dimple Oviedo, to the [facility]. Please see a copy of my visit note below.    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    HISTORY OF PRESENT ILLNESS:  Ms. 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 Her oncologic history is as under.    Ms. Oviedo is doing well. She denies any complains suggestive of recurrent disease.     She has marked bilateral pedal edema which is her primary complain. She has this since prior to her nephroureterectomy. She has been following with lymphedema therapy.      ONCOLOGIC HISTORY:  1. 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   - Jan 2018 to September 2018- Persistent gross hematuria and underwent multiple procedures.    - 2/19/18 and 4/3/18- cystoscopies with bladder biopsies  which were negative for bladder tumor  - 1/17/18, 3/8/18, 7/26/18, 9/10/18- Multiple urine cytologies have come back positive by FISH for high-grade transitional cell carcinoma  - 9/10/18- Underwent a bilateral cystoureteroscopy with biopsy and brushing that showed  right upper tract cytology suspicious for high-grade urothelial ca. Right ureter brushing negative from that day. Left upper tract negative.  - 9/10/18- CT A/P without contrast showed new small bilateral pleural effusions and interstitial pulmonary edema but no evidence of locoregional or metastatic disease.  - 9/12/18- Presented to ED with oliguria, CRESENCIO, and mild hydronephrosis  "bilaterally on CT scan and underwent bilateral ureteral stent placment  - 11/13/2018- Right robotic nephroureterectomy, excision of sandip-caval mass and LN excision by Stuart King. Pathology showed \"Right nephroureterectomy: Invasive high grade urothelial carcinoma measuring 3.5 cm, located in renal pelvis and invading through renal parenchyma into perinephric fat. Urothelial carcinoma in-situ present. Margins free of tumor. Sandip-caval mass: Metastatic urothelial carcinoma involving one lymph node with at least 1 cm tumor deposit. Pericaval Extranodal Extension present. Five additional benign pericaval lymph nodes. Pathologic stage: pT4N1 (1 of 6 lymph nodes).\"  - 12/11/18- PET/CT whole body demonstrated significant residual FDG avid disease. For example, \"there is an FDG avid ill-defined pericaval mass immediately medial to the surgical clips measuring approximately 2.0 x 1.4 cm, with max SUV measuring up to12.2, see series 4 image 21. Additional FDG avid retrocaval and interaortocaval lymphadenopathy are noted.There is a 1.2 cm nodule in the right hemipelvis posterior lateral tothe bladder with max SUV measuring 8.3, see series 4 image 77. The bladder is incompletely distended.\" No suspicious thoracic or bone uptake.  - 12/12/18- Started adjuvant chemotherapy (carbo+gem) per POUT trial. Cycle 2 - 1/2/19. Cycle 3 - 1/23/19. Cycle 4 - 02/13/19.  - 1/22/19 - CT C/A/P with contrast compared with prior PET/CT: \"1. New well-circumscribed soft tissue pulmonary nodules of the right lower lobe and left upper lobe. Findings could be infectious, inflammatory, or represent metastatic disease. Continued attention on follow-up recommended. Postoperative changes of right nephrectomy. No evidence for local recurrence in the present study.\"  - 3/1/2019- CT C/A/P with contrast - \"1. Bilateral pulmonary nodules measuring up to 4 mm in the right lower lobe, previously measuring 8 mm. No new or enlarging pulmonary nodules. 2. No " "convincing evidence for metastatic disease in the abdomen, pelvis and bones.\"  - 6/3/2019- CT C/A/P with contrast - \"1. Surgical changes of right nephrectomy without evidence of residual or recurrent disease on this noncontrast exam.  Consider contrast enhanced examination on follow-up. 2. No evidence of metastatic disease in the abdomen, or pelvis on noncontrast CT.  Scattered tiny pulmonary nodules in the chest are not significantly changed.  Previously described prominent right lower lobe pulmonary nodule (previously measuring up to 8 mm) is no longer visualized. 3. Stable bilateral pulmonary nodules measuring maximally 4 mm.\"  - 09/11/19: CT C/A/P without contrast - \"1. Stable exam without evidence convincing for new disease. 2. Stable pulmonary nodules. 3. 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 4. Stable heterogeneous enlargement of the thyroid with underlying nodules suggested.\"  - 12/4/19: CT C/A/P without contrast - \"No acute change since prior exam. No evidence of recurrent or metastatic disease. Tiny lung nodules are stable. Prior right nephrectomy. Left renal artery aneurysm is stable.\"  - 04/08/20, 7/27/20: CT C/A/P with contrast - GABRIELLE.  - 01/12/21: CT C/A/P with contrast - \"1.  Slight increased size of a 6 mm left upper lobe nodule and new 4 mm linear opacity along the right major fissure. These are indeterminate but could represent progression of metastatic disease. 2.  Slight increased size of mildly enlarged mediastinal and hilar lymph nodes. 3.  Mild pulmonary edema. 4.  No recurrent or metastatic disease in the abdomen and pelvis. 5.  Mild stranding around the urinary bladder dome may be related to cystitis. Punctate focus of gas within the urinary bladder either secondary to infection or instrumentation. 6.  Enlarged multinodular thyroid gland.\"    2. Non-muscle invasive bladder cancer:  - 10/3/19: Cystoscopy showed a small area of erythema on retroflexion, possibly pre-existing " or due to retroflexion of the scope. Urine cytology from that time showed cells suspicious for malignancy.   - 1/13/20: Bladder biopsy showed high grade urothelial carcinoma in-situ  - 2/12/20-3/18/20: Intravesical BCG therapy  - 7/24/20 and last cystoscopy was on the same day. It was negative. However, urine cytology was positive for high-grade urothelial carcinoma.   - 11/25/20-12/10/2020: 3 weekly doses of intravesical BCG 50mg.   - 12/10/20: Cytology suspicious and FISH urovysion positive for TCC.    REVIEW OF SYSTEMS: 14 point ROS negative other than the symptoms noted above in the HPI.    PAST MEDICAL AND SURGICAL HISTORY:   Past Medical History:   Diagnosis Date    CRESENCIO (acute kidney injury) (H) 9/11/2018    Arthritis     Asthma 2/9/2022    Calculus of kidney     Chemotherapy-induced neutropenia (H) 3/6/2019    Congestive heart failure (H)     Esophageal reflux     GERD (gastroesophageal reflux disease)     Hyperlipidemia LDL goal <130 5/9/2010    Malignant melanoma of skin of trunk, except scrotum (H)     Nonspecific abnormal finding     has living will 2004 -     Nontoxic multinodular goiter     no further eval /tx rec per pt    Osteopenia     Other psoriasis     Personal history of colonic polyps     PMR (polymyalgia rheumatica) (H)     Restless legs syndrome 7/11/2018    Stress-induced cardiomyopathy     Undiagnosed cardiac murmurs     Unspecified constipation     Unspecified essential hypertension     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     Past Surgical History:   Procedure Laterality Date    ARTHROPLASTY KNEE Right 11/9/2020    Procedure: ARTHROPLASTY, KNEE, TOTAL, RIGHT;  Surgeon: Edward Otero MD;  [facility]    BIOPSY      COLONOSCOPY  2014    COMBINED CYSTOSCOPY, INSERT STENT URETER(S) Bilateral 9/12/2018    Procedure: COMBINED CYSTOSCOPY, INSERT STENT URETER(S);  Cystoscopy Bilateral ureteral Stent Placement.;  Surgeon: Justin Henry MD;  [facility]    COMBINED CYSTOSCOPY,  RETROGRADES, URETEROSCOPY, INSERT STENT Bilateral 4/3/2018    Procedure: COMBINED CYSTOSCOPY, RETROGRADES, URETEROSCOPY, INSERT STENT;;  Surgeon: Stuart King MD;  [facility]    COMBINED CYSTOSCOPY, RETROGRADES, URETEROSCOPY, INSERT STENT Bilateral 9/10/2018    Procedure: COMBINED CYSTOSCOPY, RETROGRADES, URETEROSCOPY, INSERT STENT;  Cystoscopy, Bilateral Ureteroscopy, Bladder Biopsies, Retrogram Pyelograms, Ureteral Washings and brushings, cysview;  Surgeon: Stuart King MD;  [facility]    COMBINED CYSTOSCOPY, RETROGRADES, URETEROSCOPY, INSERT STENT Left 8/26/2020    Procedure: Cystoscopy, Left Ureteral Wash, Left ureteral Retrograde Pyelogram;  Surgeon: Justin Henry MD;  [facility]    CYSTOSCOPY, BIOPSY BLADDER INSTILL OPTICAL AGENT N/A 4/3/2018    Procedure: CYSTOSCOPY, BIOPSY BLADDER INSTILL OPTICAL AGENT;  Cystoscopy, Blue Light Cystoscopy, Bladder Biopsies, Bilateral Selective ureteral washings for Cytology, Bilateral Retrograde Pyelograms, Bilateral Ureteroscopy;  Surgeon: Stuart King MD;  [facility]    CYSTOSCOPY, BIOPSY BLADDER, COMBINED N/A 2/19/2018    Procedure: COMBINED CYSTOSCOPY, BIOPSY BLADDER;  Cystoscopy, Bladder Biopsy;  Surgeon: Kenna La MD;  [facility]    CYSTOSCOPY, BIOPSY BLADDER, COMBINED N/A 8/26/2020    Procedure: Cystoscopy, biopsy bladder, combined;  Surgeon: Justin Henry MD;  [facility]    CYSTOSCOPY, FULGURATE BLADDER TUMOR, COMBINED N/A 1/13/2020    Procedure: CYSTOSCOPY, WITH  bladder biopsies and fulguration;  Surgeon: Stuart King MD;  [facility]    CYSTOSCOPY, REMOVE STENT(S), COMBINED  11/13/2018    Procedure: Flexible Cystoscopy with Stent Removal;  Surgeon: Stuart King MD;  [facility]    DAVINCI LYMPHADENECTOMY N/A 11/13/2018    Procedure: Davinci Lymphadenectomy ;  Surgeon: Stuart King MD;  [facility]    DAVINCI  NEPHROURETERECTOMY N/A 11/13/2018    Procedure: Right DaVinci Assisted Nephroureterectomy;  Surgeon: Stuart King MD;  [facility]    ENDOSCOPIC ULTRASOUND LOWER GASTROINTESTIONAL TRACT (GI) N/A 10/30/2015    Procedure: ENDOSCOPIC ULTRASOUND LOWER GASTROINTESTIONAL TRACT (GI);  Surgeon: Daniel Jean-Baptiste MD;  [facility]    EYE SURGERY  12/4/17    INSERT PORT VASCULAR ACCESS Right 12/19/2018    Procedure: Chest Port Placement - right;  Surgeon: Stuart Cahvez PA-C;  [facility]    IR CHEST PORT PLACEMENT > 5 YRS OF AGE  12/19/2018    IR PORT REMOVAL RIGHT  6/26/2019    LAPAROSCOPIC CHOLECYSTECTOMY WITH CHOLANGIOGRAMS N/A 11/1/2015    Procedure: LAPAROSCOPIC CHOLECYSTECTOMY WITH CHOLANGIOGRAMS;  Surgeon: Tonie Warren MD;  [facility]    REMOVE PORT VASCULAR ACCESS Right 6/26/2019    Procedure: Right Port Removal;  Surgeon: Froilan Irizarry PA-C;  [facility]    SURGICAL HISTORY OF -   1996    malignant melanoma    SURGICAL HISTORY OF -   1968    thyroid nodule    SURGICAL HISTORY OF -       D & C    ZZC NONSPECIFIC PROCEDURE  2005    colonoscopy polyp repeat 2010     SOCIAL HISTORY:   Social History     Tobacco Use    Smoking status: Never    Smokeless tobacco: Never   Vaping Use    Vaping Use: Never used   Substance Use Topics    Alcohol use: No     Alcohol/week: 0.0 standard drinks of alcohol     Comment: rare    Drug use: No     FAMILY HISTORY:   Family History   Problem Relation Age of Onset    Cancer Father         dec - esophageal and laryngeal    Heart Disease Mother     Respiratory Mother         dec    Breast Cancer Daughter     Other Cancer Daughter     Thyroid Disease Daughter     Asthma Daughter     Hyperlipidemia Son     Diabetes Son     Anesthesia Reaction No family hx of     Deep Vein Thrombosis (DVT) No family hx of      ALLERGIES:   No Known Allergies  CURRENT MEDICATIONS:   Current Outpatient Medications:     alendronate (FOSAMAX) 70 MG  tablet, TAKE 1 TABLET EVERY 7 DAYS AT LEAST 60 MINUTES BEFORE BREAKFAST AS DIRECTED., Disp: 12 tablet, Rfl: 2    diltiazem ER (DILT-XR) 180 MG 24 hr capsule, Take 1 capsule (180 mg) by mouth daily, Disp: 90 capsule, Rfl: 3    ferrous sulfate 325 (65 Fe) MG TBEC EC tablet, Take 325 mg by mouth every morning , Disp: , Rfl:     furosemide (LASIX) 20 MG tablet, TAKE 1 TABLET TWICE DAILY IN THE MORNING  AND  AFTER  LUNCH, Disp: 180 tablet, Rfl: 3    irbesartan (AVAPRO) 300 MG tablet, Take 1 tablet (300 mg) by mouth daily, Disp: 90 tablet, Rfl: 3    levofloxacin (LEVAQUIN) 500 MG tablet, Take 1 tablet by mouth, Disp: 4 tablet, Rfl: 0    levofloxacin (LEVAQUIN) 500 MG tablet, Take one tablet six hours after treatment and one tablet morning after treatment, Disp: 4 tablet, Rfl: 11    levofloxacin (LEVAQUIN) 500 MG tablet, Take 1 tablet  By mouth 2 hours before BCG infusion and 1 tablet by mouth 8 hours after BCG infusion for 3 treatments, Disp: 6 tablet, Rfl: 0    lovastatin (MEVACOR) 40 MG tablet, TAKE 1 TABLET AT BEDTIME (SCHEDULE ANNUAL VISIT), Disp: 90 tablet, Rfl: 0    methimazole (TAPAZOLE) 5 MG tablet, TAKE 1 TABLET ALTERNATING WITH 1/2 TABLET EVERY OTHER DAY, Disp: 66 tablet, Rfl: 3    Omega-3 Fatty Acids (FISH OIL) 500 MG CAPS, Take 1 capsule by mouth every morning , Disp: , Rfl:     omeprazole (PRILOSEC) 20 MG DR capsule, Take 1 capsule (20 mg) by mouth daily, Disp: 90 capsule, Rfl: 3    propranolol ER (INDERAL LA) 60 MG 24 hr capsule, TAKE 1 CAPSULE EVERY DAY, Disp: 90 capsule, Rfl: 3    RESTASIS 0.05 % ophthalmic emulsion, , Disp: , Rfl:     rivaroxaban ANTICOAGULANT (XARELTO) 10 MG TABS tablet, Take 1 tablet (10 mg) by mouth daily (with dinner), Disp: 30 tablet, Rfl: 0    rOPINIRole (REQUIP) 0.25 MG tablet, TAKE 1 TABLET IN THE AFTERNOON AND TAKE 2 TABLETS EVERY NIGHT, Disp: 270 tablet, Rfl: 3    sertraline (ZOLOFT) 100 MG tablet, Take 1 tablet (100 mg) by mouth every morning, Disp: 90 tablet, Rfl: 3     traZODone (DESYREL) 50 MG tablet, TAKE 1/2 TABLET AT BEDTIME, Disp: 45 tablet, Rfl: 3    Current Facility-Administered Medications:     lidocaine (XYLOCAINE) 2 % external gel, , Urethral, Once, Justin Henry MD    PHYSICAL EXAMINATION:  Deferred. Phone visit due to COVID.    LABORATORY DATA:   Recent Labs   Lab Test 07/18/23  1158 06/12/23  1332 02/27/23  1306 12/09/22  1029 12/09/22  1000 10/08/22  1340 09/12/22  1036     --  138  --  139 133* 135   POTASSIUM 4.7  --  4.8  --  4.4 4.0 4.3   CHLORIDE 100  --  100  --  101 96* 102   CO2 28  --  27  --  28 26 27   ANIONGAP 9  --  11  --  10 11 6   BUN 19.0  --  18.5  --  18.1 19.0 21   CR 1.05* 0.9 0.96* 1.0 1.05* 0.91 0.99   *  --  102*  --  132* 104* 116*   SHREYA 9.7*  --  9.8  --  9.4 9.6 9.2     Recent Labs   Lab Test 09/02/21  0644 09/01/21  1505 02/03/19  2102 12/12/18  1050 01/16/18  1247 01/16/18  0646 12/13/17  2236 04/18/16  0905 11/02/15  0912 11/01/15  0727 10/31/15  0810   MAG 2.4* 2.3 1.8 2.1 2.1   < > 2.0  --   --  2.0  --    PHOS  --   --   --   --   --   --  2.4* 3.2 3.2 2.1* 2.4*    < > = values in this interval not displayed.     Recent Labs   Lab Test 07/18/23  1158 02/27/23  1306 12/09/22  1000 10/08/22  1340 09/05/22  1446   WBC 6.4 8.4 7.7 10.6 8.3   HGB 12.9 12.8 13.3 12.0 11.6*    192 240 228 210   MCV 97 97 97 96 96   NEUTROPHIL 71 73 78 72 75     Recent Labs   Lab Test 07/18/23  1158 02/27/23  1306 12/09/22  1000 09/05/22  1446 05/31/22  2342 12/09/21  2356 11/19/21  0911 08/11/21  1031 09/11/18  0612 07/17/18  1346   BILITOTAL 0.5 0.4 0.5   < > 0.4   < > 0.5 0.4   < >  --    ALKPHOS 82 92 91   < > 103   < > 98 88   < >  --    ALT 11 19 19   < > 21   < > 23 17   < >  --    AST 22  --  27  --  20   < > 15 14   < >  --    ALBUMIN 4.2 3.9 4.2   < > 3.7   < > 3.4 3.5   < >  --    LDH  --   --   --   --   --   --  180 176  --  204    < > = values in this interval not displayed.     TSH   Date Value Ref Range Status    06/22/2023 2.39 0.30 - 4.20 uIU/mL Final   12/09/2022 2.62 0.30 - 4.20 uIU/mL Final   05/20/2022 2.90 0.40 - 4.00 mU/L Final   09/02/2021 1.24 0.40 - 4.00 mU/L Final   07/05/2021 1.68 0.40 - 4.00 mU/L Final   05/27/2021 1.93 0.40 - 4.00 mU/L Final   01/27/2021 2.42 0.40 - 4.00 mU/L Final     No results for input(s): CEA in the last 27743 hours.  Results for orders placed or performed in visit on 06/12/23   CT Chest/Abdomen/Pelvis w Contrast    Narrative    EXAM: CT CHEST/ABDOMEN/PELVIS W CONTRAST  LOCATION: [facility]  DATE/TIME: 6/12/2023 1:57 PM CDT    INDICATION: High grade, muscle invasive, transitional cell carcinoma of right renal pelvis, stage IV ([id number]): surveillance scans  COMPARISON: Multiple exams most recent 02/27/2023 and 12/09/2022 02/27/2023.  TECHNIQUE: CT scan of the chest, abdomen, and pelvis was performed following injection of IV contrast. Multiplanar reformats were obtained. Dose reduction techniques were used.   CONTRAST: Isovue 370 96cc    FINDINGS:   LUNGS AND PLEURA: Nodular thickening of the left pleura in the lower lobe posteriorly is more prominent than prior and indeterminate, measuring 1 cm in thickness on this exam versus 0.5 cm previously (series 3 image 158). Adjacent subpleural nodule in the posterior medial left lower lobe measuring 9 x 5 mm is also larger since older examinations (series 4 image 138). Unchanged 5 mm nodule in the left upper lobe dating back to 08/11/2021 (series 4 image 90). Calcified granulomas No pleural effusion.    MEDIASTINUM/AXILLAE: Unchanged prominent right paratracheal, left hilar and right cardiophrenic lymph nodes. Enlarged multinodular thyroid which has been assessed by ultrasound. Moderate hiatal hernia.    CORONARY ARTERY CALCIFICATION: Mild.    HEPATOBILIARY: No focal liver lesion. Cholecystectomy. Extrahepatic biliary prominence likely related to age and postcholecystectomy state.    PANCREAS:  Normal.    SPLEEN: Normal.    ADRENAL GLANDS: Normal.    KIDNEYS/BLADDER: Right nephrectomy. No evidence of local recurrence. Nonobstructing unchanged 4 mm left intrarenal calculus. Bladder decompressed.    BOWEL: Normal.    LYMPH NODES: Normal.    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    PELVIC ORGANS: Normal.    MUSCULOSKELETAL: Normal.      Impression    IMPRESSION:  1.  Nodular thickening of the pleura in the left lower lobe and adjacent subpleural left lower lobe nodule are larger. Pleural metastasis not excluded. Consider PET/CT for further evaluation.  2.  No recurrent or metastatic disease in the abdomen and pelvis.  3.  Right nephrectomy.  4.  Nonobstructing left intrarenal calculus.  5.  Stable left renal artery aneurysm.     *Note: Due to a large number of results and/or encounters for the requested time period, some results have not been displayed. A complete set of results can be found in Results Review.         ASSESSMENT AND PLAN: Ms. Oviedo is a delightful 89 year old lady with localized stage IV ([id number]) high-grade, muscle-invasive transitional cell carcinoma of right renal pelvis, status post right robotic nephroureterectomy and 4 cycles of adjuvant carbo/gem; as well as NMIBC.    1. Upper tract urothelial cancer:   - She completed 4 cycles of adjuvant carboplatin plus gemcitabine chemotherapy per POUT trial.    She completed adjuvant chemotherapy in Feb 2019 over 4 yrs ago  - No residual side effects or evidence of recurrence.  - Reviewed restaging CT scan from June 12th, 2023; there is no clear evidence of disease recurrence in abd/pelvis.   She continues to have pulmonary nodules which remain stable except for growth in one of the pulmonary nodule    She also has pleural nodule in left lower lobe.    Lung findings are non-specific and these are also not responsible for her shortness of breath    - I reviewed option of PET-CT  scans vs biopsy. The definitive diagnosis would only come after a biopsy. However, PET-CT scan has much higher sensitivity and it would change level of suspicion for the biopsy.    - I will follow her in 2 months with labs and restaging PET-CT scan    2. High grade urothelial carcinoma in situ:  - Bx proven carcinoma in situ in 1/2020, completed the first round of intravesical BCG treatment 2/2020-3/2020 and second in 11/2020-12/2020.  - She was initially followed by Dr. King and now is under care of Dr. Froilan Henry.   - She did not have urine cytology done and I will get it after this visit.   - She will continue on 3 weekly BCG every 3 months as part of her maintenance along with follow up cystoscopies    3. Chemo induced anemia and thrombocytopenia:  - Resolved.      4. Persistent HERMAN:  - She follows Dr. Griffith in Cardiology for her h/o moderate AORTIC STENOSIS, CHF, and Afib now with worsening SOA and lymphedema with no cancer-related etiology evident.     Return to clinic in 6 months with restaging scans.    All of the above was explained to the patient in lay language and several questions were answered. They are in agreement with the plan.     25 minutes spent on the date of the encounter doing chart review, history and exam, documentation and further activities as noted above     Sd Fine    Hematologist and Medical Oncologist  JAMMIE Martin

## 2023-07-18 NOTE — NURSING NOTE
"Oncology Rooming Note    July 18, 2023 1:01 PM   Dimple Oviedo is a 89 year old female who presents for:    Chief Complaint   Patient presents with     Oncology Clinic Visit     Urothelial carcinoma of right distal ureter      Initial Vitals: /66   Pulse 67   Temp 98.1  F (36.7  C) (Oral)   Resp 16   Wt 79.4 kg (175 lb 1.6 oz)   SpO2 95%   BMI 37.89 kg/m   Estimated body mass index is 37.89 kg/m  as calculated from the following:    Height as of 3/23/23: 1.448 m (4' 9\").    Weight as of this encounter: 79.4 kg (175 lb 1.6 oz). Body surface area is 1.79 meters squared.  No Pain (0) Comment: Data Unavailable   No LMP recorded. Patient is postmenopausal.  Allergies reviewed: Yes  Medications reviewed: Yes    Medications: Medication refills not needed today.  Pharmacy name entered into [employer]:    [facility] - [facility] [address] [facility] [place] [facility] [place], [place] [address] [facility] [place], [place] - [address] [place] [address] [phone]  [id number] - [place], [place] - [address] AT [facility] OF [address]    Clinical concerns: Patient states there are no new concerns to discuss with provider.        Aneesh Wright              "

## 2023-08-28 NOTE — TELEPHONE ENCOUNTER
[facility] Call Center    Phone Message    May a detailed message be left on voicemail: yes    Reason for Call: Other: Pts daughter in law Rena calling concerned about pts current condition, mainly her leg edema. Please call back to discuss.     Action Taken: Message routed to:  [facility]   lacerations unknown

## 2023-09-13 ENCOUNTER — HOSPITAL ENCOUNTER (OUTPATIENT)
Dept: PET IMAGING | Facility: CLINIC | Age: 88
Discharge: HOME | End: 2023-09-13
Attending: INTERNAL MEDICINE
Payer: MEDICARE

## 2023-09-13 DIAGNOSIS — C68.9 UROTHELIAL CARCINOMA (H): ICD-10-CM

## 2023-09-13 DIAGNOSIS — R91.8 PULMONARY NODULES: ICD-10-CM

## 2023-09-13 DIAGNOSIS — E05.00 GRAVES DISEASE: ICD-10-CM

## 2023-09-13 LAB
CREAT BLD-MCNC: 1.1 MG/DL (ref 0.5–1)
EGFRCR SERPLBLD CKD-EPI 2021: 47 ML/MIN/1.73M2

## 2023-09-13 PROCEDURE — A9552 F18 FDG: HCPCS | Performed by: INTERNAL MEDICINE

## 2023-09-13 PROCEDURE — 250N000011 HC RX IP 250 OP 636: Performed by: INTERNAL MEDICINE

## 2023-09-13 PROCEDURE — 74177 CT ABD & PELVIS W/CONTRAST: CPT

## 2023-09-13 PROCEDURE — G1010 CDSM STANSON: HCPCS | Performed by: RADIOLOGY

## 2023-09-13 PROCEDURE — 82565 ASSAY OF CREATININE: CPT

## 2023-09-13 PROCEDURE — 78815 PET IMAGE W/CT SKULL-THIGH: CPT | Mod: MG,PS

## 2023-09-13 PROCEDURE — 71260 CT THORAX DX C+: CPT | Mod: 26 | Performed by: RADIOLOGY

## 2023-09-13 PROCEDURE — 78815 PET IMAGE W/CT SKULL-THIGH: CPT | Mod: 26 | Performed by: RADIOLOGY

## 2023-09-13 PROCEDURE — 74177 CT ABD & PELVIS W/CONTRAST: CPT | Mod: 26 | Performed by: RADIOLOGY

## 2023-09-13 PROCEDURE — 343N000001 HC RX 343: Performed by: INTERNAL MEDICINE

## 2023-09-13 RX ORDER — IOPAMIDOL 755 MG/ML
45-150 INJECTION, SOLUTION INTRAVASCULAR ONCE
Status: COMPLETED | OUTPATIENT
Start: 2023-09-13 | End: 2023-09-13

## 2023-09-13 RX ADMIN — IOPAMIDOL 95 ML: 755 INJECTION, SOLUTION INTRAVENOUS at 13:36

## 2023-09-13 RX ADMIN — FLUDEOXYGLUCOSE F-18 10.11 MILLICURIE: 500 INJECTION, SOLUTION INTRAVENOUS at 12:33

## 2023-09-19 ENCOUNTER — PATIENT OUTREACH (OUTPATIENT)
Dept: ONCOLOGY | Facility: CLINIC | Age: 88
End: 2023-09-19
Payer: MEDICARE

## 2023-09-19 ENCOUNTER — PATIENT OUTREACH (OUTPATIENT)
Dept: CARE COORDINATION | Facility: CLINIC | Age: 88
End: 2023-09-19

## 2023-09-19 ENCOUNTER — ONCOLOGY VISIT (OUTPATIENT)
Dept: ONCOLOGY | Facility: CLINIC | Age: 88
End: 2023-09-19
Attending: INTERNAL MEDICINE
Payer: MEDICARE

## 2023-09-19 VITALS
TEMPERATURE: 98.5 F | BODY MASS INDEX: 38.02 KG/M2 | SYSTOLIC BLOOD PRESSURE: 146 MMHG | RESPIRATION RATE: 20 BRPM | DIASTOLIC BLOOD PRESSURE: 76 MMHG | OXYGEN SATURATION: 94 % | WEIGHT: 175.7 LBS | HEART RATE: 77 BPM

## 2023-09-19 DIAGNOSIS — C68.9 UROTHELIAL CARCINOMA (H): Primary | ICD-10-CM

## 2023-09-19 DIAGNOSIS — R91.8 PULMONARY NODULES: ICD-10-CM

## 2023-09-19 DIAGNOSIS — E05.00 GRAVES DISEASE: ICD-10-CM

## 2023-09-19 DIAGNOSIS — C66.1 UROTHELIAL CARCINOMA OF RIGHT DISTAL URETER (H): ICD-10-CM

## 2023-09-19 PROCEDURE — G0463 HOSPITAL OUTPT CLINIC VISIT: HCPCS | Performed by: INTERNAL MEDICINE

## 2023-09-19 PROCEDURE — 99215 OFFICE O/P EST HI 40 MIN: CPT | Performed by: INTERNAL MEDICINE

## 2023-09-19 ASSESSMENT — PAIN SCALES - GENERAL: PAINLEVEL: NO PAIN (0)

## 2023-09-19 NOTE — PROGRESS NOTES
Social Work - Intervention  [facility]  Data/Intervention:    Patient Name: Dimple Oviedo Goes By: Dimple WHITAKERB/Age: 1933 (89 year old)     Visit Type: telephone  Referral Source: [facility]  Reason for Referral: Planning for future care for Patient    Collaborated With:    -Patient  -Patient's daughter, Nidia Joyner     Psychosocial Information/Concerns:   called Patient. She acknowledged that they wanted to discuss care options. She asked if she could hand the phone off to her daughter who was with her.  spoke with Nidia Joyner on the phone. Nidia reported that Patient lives alone and they were wondering what resources are there to help Patient in the home, if needed. Patient may have a Long Term Care insurance policy.      Intervention/Education/Resources Provided:   provided supportive listening and validation throughout conversation.  encouraged them to look into the policy to figure out the terms.  also provided education on finding [employer]-type agencies to either hire with LTC insurance or private pay.  also provided education on the [employer] and [employer] meal deliveries. Per Nidia's request,  emailed the information to them. Nidia, who lives in [place], reported that if Patient should no longer be able to live independently, Nidia would potentially move Patient to [place].  encouraged Nidia to keep clinic updated with their plans.      Assessment/Plan:  Nidia to utilize resources as needed.  will remain available in the future as needed.      Provided patient/family with contact information and availability.    Crista Juárez [facility]  Clinical , Outpatient Specialty Clinics  [facility]  Direct Phone: [phone]

## 2023-09-19 NOTE — NURSING NOTE
"Oncology Rooming Note    September 19, 2023 10:34 AM   Dimple Oviedo is a 89 year old female who presents for:    Chief Complaint   Patient presents with    Oncology Clinic Visit     Urothelial carcinoma      Initial Vitals: BP (!) 146/76 (BP Location: Right arm, Patient Position: Sitting, Cuff Size: Adult Regular)   Pulse 77   Temp 98.5  F (36.9  C) (Oral)   Resp 20   Wt 79.7 kg (175 lb 11.2 oz)   SpO2 94%   BMI 38.02 kg/m   Estimated body mass index is 38.02 kg/m  as calculated from the following:    Height as of 3/23/23: 1.448 m (4' 9\").    Weight as of this encounter: 79.7 kg (175 lb 11.2 oz). Body surface area is 1.79 meters squared.  No Pain (0) Comment: Data Unavailable   No LMP recorded. Patient is postmenopausal.  Allergies reviewed: Yes  Medications reviewed: Yes    Medications: Medication refills not needed today.  Pharmacy name entered into [facility]:    [facility] - [place], [place] - [address]  [facility] [place], [place] [address] [address] [facility] - [address] [facility] [phone]  [id number] - [place], [place] - [address] AT [facility] [address]    Clinical concerns: none       Josie Oliver              "

## 2023-09-19 NOTE — LETTER
9/19/2023         RE: Dimple Oviedo  [address]  [facility] [address]        Dear Colleague,    Thank you for referring your patient, Dimple Oviedo, to the [facility]. Please see a copy of my visit note below.    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    HISTORY OF PRESENT ILLNESS:  Ms. 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 Her oncologic history is as under.    Ms. Oviedo is doing well. She denies any complains suggestive of recurrent disease.     She is here with her daughter and son in law in person. They have come from [facility] for this clinic visit. Her son Geoff also joined us over the phone.      ONCOLOGIC HISTORY:  1. High-grade, muscle-invasive, transitional cell carcinoma of right renal pelvis, stage IV ([id number]):  - Dec 2017- Started having gross hematuria.   - Jan 2018 to September 2018- Persistent gross hematuria and underwent multiple procedures.    - 2/19/18 and 4/3/18- cystoscopies with bladder biopsies  which were negative for bladder tumor  - 1/17/18, 3/8/18, 7/26/18, 9/10/18- Multiple urine cytologies have come back positive by FISH for high-grade transitional cell carcinoma  - 9/10/18- Underwent a bilateral cystoureteroscopy with biopsy and brushing that showed  right upper tract cytology suspicious for high-grade urothelial ca. Right ureter brushing negative from that day. Left upper tract negative.  - 9/10/18- CT A/P without contrast showed new small bilateral pleural effusions and interstitial pulmonary edema but no evidence of locoregional or metastatic disease.  - 9/12/18- Presented to ED with oliguria, CRESENCIO, and mild hydronephrosis bilaterally  "on CT scan and underwent bilateral ureteral stent placment  - 11/13/2018- Right robotic nephroureterectomy, excision of sandip-caval mass and LN excision by Stuart King. Pathology showed \"Right nephroureterectomy: Invasive high grade urothelial carcinoma measuring 3.5 cm, located in renal pelvis and invading through renal parenchyma into perinephric fat. Urothelial carcinoma in-situ present. Margins free of tumor. Sandip-caval mass: Metastatic urothelial carcinoma involving one lymph node with at least 1 cm tumor deposit. Pericaval Extranodal Extension present. Five additional benign pericaval lymph nodes. Pathologic stage: pT4N1 (1 of 6 lymph nodes).\"  - 12/11/18- PET/CT whole body demonstrated significant residual FDG avid disease. For example, \"there is an FDG avid ill-defined pericaval mass immediately medial to the surgical clips measuring approximately 2.0 x 1.4 cm, with max SUV measuring up to12.2, see series 4 image 21. Additional FDG avid retrocaval and interaortocaval lymphadenopathy are noted.There is a 1.2 cm nodule in the right hemipelvis posterior lateral tothe bladder with max SUV measuring 8.3, see series 4 image 77. The bladder is incompletely distended.\" No suspicious thoracic or bone uptake.  - 12/12/18- Started adjuvant chemotherapy (carbo+gem) per POUT trial. Cycle 2 - 1/2/19. Cycle 3 - 1/23/19. Cycle 4 - 02/13/19.  - 1/22/19 - CT C/A/P with contrast compared with prior PET/CT: \"1. New well-circumscribed soft tissue pulmonary nodules of the right lower lobe and left upper lobe. Findings could be infectious, inflammatory, or represent metastatic disease. Continued attention on follow-up recommended. Postoperative changes of right nephrectomy. No evidence for local recurrence in the present study.\"  - 3/1/2019- CT C/A/P with contrast - \"1. Bilateral pulmonary nodules measuring up to 4 mm in the right lower lobe, previously measuring 8 mm. No new or enlarging pulmonary nodules. 2. No convincing " "evidence for metastatic disease in the abdomen, pelvis and bones.\"  - 6/3/2019- CT C/A/P with contrast - \"1. Surgical changes of right nephrectomy without evidence of residual or recurrent disease on this noncontrast exam.  Consider contrast enhanced examination on follow-up. 2. No evidence of metastatic disease in the abdomen, or pelvis on noncontrast CT.  Scattered tiny pulmonary nodules in the chest are not significantly changed.  Previously described prominent right lower lobe pulmonary nodule (previously measuring up to 8 mm) is no longer visualized. 3. Stable bilateral pulmonary nodules measuring maximally 4 mm.\"  - 09/11/19: CT C/A/P without contrast - \"1. Stable exam without evidence convincing for new disease. 2. Stable pulmonary nodules. 3. 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 4. Stable heterogeneous enlargement of the thyroid with underlying nodules suggested.\"  - 12/4/19: CT C/A/P without contrast - \"No acute change since prior exam. No evidence of recurrent or metastatic disease. Tiny lung nodules are stable. Prior right nephrectomy. Left renal artery aneurysm is stable.\"  - 04/08/20, 7/27/20: CT C/A/P with contrast - GABRIELLE.  - 01/12/21: CT C/A/P with contrast - \"1.  Slight increased size of a 6 mm left upper lobe nodule and new 4 mm linear opacity along the right major fissure. These are indeterminate but could represent progression of metastatic disease. 2.  Slight increased size of mildly enlarged mediastinal and hilar lymph nodes. 3.  Mild pulmonary edema. 4.  No recurrent or metastatic disease in the abdomen and pelvis. 5.  Mild stranding around the urinary bladder dome may be related to cystitis. Punctate focus of gas within the urinary bladder either secondary to infection or instrumentation. 6.  Enlarged multinodular thyroid gland.\"    2. Non-muscle invasive bladder cancer:  - 10/3/19: Cystoscopy showed a small area of erythema on retroflexion, possibly pre-existing or due to " retroflexion of the scope. Urine cytology from that time showed cells suspicious for malignancy.   - 1/13/20: Bladder biopsy showed high grade urothelial carcinoma in-situ  - 2/12/20-3/18/20: Intravesical BCG therapy  - 7/24/20 and last cystoscopy was on the same day. It was negative. However, urine cytology was positive for high-grade urothelial carcinoma.   - 11/25/20-12/10/2020: 3 weekly doses of intravesical BCG 50mg.   - 12/10/20: Cytology suspicious and FISH urovysion positive for TCC.    REVIEW OF SYSTEMS: 14 point ROS negative other than the symptoms noted above in the HPI.    PAST MEDICAL AND SURGICAL HISTORY:   Past Medical History:   Diagnosis Date    CRESENCIO (acute kidney injury) (H) 9/11/2018    Arthritis     Asthma 2/9/2022    Calculus of kidney     Chemotherapy-induced neutropenia (H) 3/6/2019    Congestive heart failure (H)     Esophageal reflux     GERD (gastroesophageal reflux disease)     Hyperlipidemia LDL goal <130 5/9/2010    Malignant melanoma of skin of trunk, except scrotum (H)     Nonspecific abnormal finding     has living will 2004 -     Nontoxic multinodular goiter     no further eval /tx rec per pt    Osteopenia     Other psoriasis     Personal history of colonic polyps     PMR (polymyalgia rheumatica) (H)     Restless legs syndrome 7/11/2018    Stress-induced cardiomyopathy     Undiagnosed cardiac murmurs     Unspecified constipation     Unspecified essential hypertension     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     Past Surgical History:   Procedure Laterality Date    ARTHROPLASTY KNEE Right 11/9/2020    Procedure: ARTHROPLASTY, KNEE, TOTAL, RIGHT;  Surgeon: Edward Otero MD;  [facility]    BIOPSY      COLONOSCOPY  2014    COMBINED CYSTOSCOPY, INSERT STENT URETER(S) Bilateral 9/12/2018    Procedure: COMBINED CYSTOSCOPY, INSERT STENT URETER(S);  Cystoscopy Bilateral ureteral Stent Placement.;  Surgeon: Justin Henry MD;  [facility]    COMBINED CYSTOSCOPY,  RETROGRADES, URETEROSCOPY, INSERT STENT Bilateral 4/3/2018    Procedure: COMBINED CYSTOSCOPY, RETROGRADES, URETEROSCOPY, INSERT STENT;;  Surgeon: Stuart King MD;  [facility]    COMBINED CYSTOSCOPY, RETROGRADES, URETEROSCOPY, INSERT STENT Bilateral 9/10/2018    Procedure: COMBINED CYSTOSCOPY, RETROGRADES, URETEROSCOPY, INSERT STENT;  Cystoscopy, Bilateral Ureteroscopy, Bladder Biopsies, Retrogram Pyelograms, Ureteral Washings and brushings, cysview;  Surgeon: Stuart King MD;  [facility]    COMBINED CYSTOSCOPY, RETROGRADES, URETEROSCOPY, INSERT STENT Left 8/26/2020    Procedure: Cystoscopy, Left Ureteral Wash, Left ureteral Retrograde Pyelogram;  Surgeon: Justin Henry MD;  [facility]    CYSTOSCOPY, BIOPSY BLADDER INSTILL OPTICAL AGENT N/A 4/3/2018    Procedure: CYSTOSCOPY, BIOPSY BLADDER INSTILL OPTICAL AGENT;  Cystoscopy, Blue Light Cystoscopy, Bladder Biopsies, Bilateral Selective ureteral washings for Cytology, Bilateral Retrograde Pyelograms, Bilateral Ureteroscopy;  Surgeon: Stuart King MD;  [facility]    CYSTOSCOPY, BIOPSY BLADDER, COMBINED N/A 2/19/2018    Procedure: COMBINED CYSTOSCOPY, BIOPSY BLADDER;  Cystoscopy, Bladder Biopsy;  Surgeon: Kenna La MD;  [facility]    CYSTOSCOPY, BIOPSY BLADDER, COMBINED N/A 8/26/2020    Procedure: Cystoscopy, biopsy bladder, combined;  Surgeon: Justin Henry MD;  [facility]    CYSTOSCOPY, FULGURATE BLADDER TUMOR, COMBINED N/A 1/13/2020    Procedure: CYSTOSCOPY, WITH  bladder biopsies and fulguration;  Surgeon: Stuart King MD;  [facility]    CYSTOSCOPY, REMOVE STENT(S), COMBINED  11/13/2018    Procedure: Flexible Cystoscopy with Stent Removal;  Surgeon: Stuart King MD;  [facility]    DAVINCI LYMPHADENECTOMY N/A 11/13/2018    Procedure: Davinci Lymphadenectomy ;  Surgeon: Stuart King MD;  [facility]    DAVINCI  NEPHROURETERECTOMY N/A 11/13/2018    Procedure: Right DaVinci Assisted Nephroureterectomy;  Surgeon: Stuart King MD;  [facility]    ENDOSCOPIC ULTRASOUND LOWER GASTROINTESTIONAL TRACT (GI) N/A 10/30/2015    Procedure: ENDOSCOPIC ULTRASOUND LOWER GASTROINTESTIONAL TRACT (GI);  Surgeon: Daniel Jean-Baptiste MD;  [facility]    EYE SURGERY  12/4/17    INSERT PORT VASCULAR ACCESS Right 12/19/2018    Procedure: Chest Port Placement - right;  Surgeon: Stuart Chavez PA-C;  [facility]    IR CHEST PORT PLACEMENT > 5 YRS OF AGE  12/19/2018    IR PORT REMOVAL RIGHT  6/26/2019    LAPAROSCOPIC CHOLECYSTECTOMY WITH CHOLANGIOGRAMS N/A 11/1/2015    Procedure: LAPAROSCOPIC CHOLECYSTECTOMY WITH CHOLANGIOGRAMS;  Surgeon: Tonie Warren MD;  [facility]    REMOVE PORT VASCULAR ACCESS Right 6/26/2019    Procedure: Right Port Removal;  Surgeon: Froilan Irizarry PA-C;  [facility]    SURGICAL HISTORY OF -   1996    malignant melanoma    SURGICAL HISTORY OF -   1968    thyroid nodule    SURGICAL HISTORY OF -       D & C    ZZC NONSPECIFIC PROCEDURE  2005    colonoscopy polyp repeat 2010     SOCIAL HISTORY:   Social History     Tobacco Use    Smoking status: Never     Passive exposure: Never    Smokeless tobacco: Never   Vaping Use    Vaping Use: Never used   Substance Use Topics    Alcohol use: No     Alcohol/week: 0.0 standard drinks of alcohol     Comment: rare    Drug use: No     FAMILY HISTORY:   Family History   Problem Relation Age of Onset    Cancer Father         dec - esophageal and laryngeal    Heart Disease Mother     Respiratory Mother         dec    Breast Cancer Daughter     Other Cancer Daughter     Thyroid Disease Daughter     Asthma Daughter     Hyperlipidemia Son     Diabetes Son     Anesthesia Reaction No family hx of     Deep Vein Thrombosis (DVT) No family hx of      ALLERGIES:   No Known Allergies  CURRENT MEDICATIONS:   Current Outpatient Medications:      alendronate (FOSAMAX) 70 MG tablet, TAKE 1 TABLET EVERY 7 DAYS AT LEAST 60 MINUTES BEFORE BREAKFAST AS DIRECTED., Disp: 12 tablet, Rfl: 2    diltiazem ER (DILT-XR) 180 MG 24 hr capsule, Take 1 capsule (180 mg) by mouth daily, Disp: 90 capsule, Rfl: 3    ferrous sulfate 325 (65 Fe) MG TBEC EC tablet, Take 325 mg by mouth every morning , Disp: , Rfl:     furosemide (LASIX) 20 MG tablet, TAKE 1 TABLET TWICE DAILY IN THE MORNING  AND  AFTER  LUNCH, Disp: 180 tablet, Rfl: 3    irbesartan (AVAPRO) 300 MG tablet, Take 1 tablet (300 mg) by mouth daily, Disp: 90 tablet, Rfl: 3    levofloxacin (LEVAQUIN) 500 MG tablet, Take one tablet six hours after treatment and one tablet morning after treatment, Disp: 4 tablet, Rfl: 11    lovastatin (MEVACOR) 40 MG tablet, TAKE 1 TABLET AT BEDTIME (SCHEDULE ANNUAL VISIT), Disp: 90 tablet, Rfl: 0    methimazole (TAPAZOLE) 5 MG tablet, TAKE 1 TABLET ALTERNATING WITH 1/2 TABLET EVERY OTHER DAY, Disp: 66 tablet, Rfl: 3    Omega-3 Fatty Acids (FISH OIL) 500 MG CAPS, Take 1 capsule by mouth every morning , Disp: , Rfl:     omeprazole (PRILOSEC) 20 MG DR capsule, Take 1 capsule (20 mg) by mouth daily, Disp: 90 capsule, Rfl: 3    propranolol ER (INDERAL LA) 60 MG 24 hr capsule, TAKE 1 CAPSULE EVERY DAY, Disp: 90 capsule, Rfl: 3    RESTASIS 0.05 % ophthalmic emulsion, , Disp: , Rfl:     rivaroxaban ANTICOAGULANT (XARELTO) 10 MG TABS tablet, Take 1 tablet (10 mg) by mouth daily (with dinner), Disp: 30 tablet, Rfl: 0    rOPINIRole (REQUIP) 0.25 MG tablet, TAKE 1 TABLET IN THE AFTERNOON AND TAKE 2 TABLETS EVERY NIGHT, Disp: 270 tablet, Rfl: 3    sertraline (ZOLOFT) 100 MG tablet, Take 1 tablet (100 mg) by mouth every morning, Disp: 90 tablet, Rfl: 3    traZODone (DESYREL) 50 MG tablet, TAKE 1/2 TABLET AT BEDTIME, Disp: 45 tablet, Rfl: 3    levofloxacin (LEVAQUIN) 500 MG tablet, Take 1 tablet by mouth, Disp: 4 tablet, Rfl: 0    levofloxacin (LEVAQUIN) 500 MG tablet, Take 1 tablet  By mouth 2 hours  before BCG infusion and 1 tablet by mouth 8 hours after BCG infusion for 3 treatments, Disp: 6 tablet, Rfl: 0    Current Facility-Administered Medications:     lidocaine (XYLOCAINE) 2 % external gel, , Urethral, Once, Justin Henry MD    PHYSICAL EXAMINATION:  Deferred. Phone visit due to COVID.    LABORATORY DATA:   Recent Labs   Lab Test 09/13/23  1233 07/18/23  1158 06/12/23  1332 02/27/23  1306 12/09/22  1029 12/09/22  1000 10/08/22  1340 09/12/22  1036   NA  --  137  --  138  --  139 133* 135   POTASSIUM  --  4.7  --  4.8  --  4.4 4.0 4.3   CHLORIDE  --  100  --  100  --  101 96* 102   CO2  --  28  --  27  --  28 26 27   ANIONGAP  --  9  --  11  --  10 11 6   BUN  --  19.0  --  18.5  --  18.1 19.0 21   CR 1.1* 1.05* 0.9 0.96* 1.0 1.05* 0.91 0.99   GLC  --  144*  --  102*  --  132* 104* 116*   SHREYA  --  9.7*  --  9.8  --  9.4 9.6 9.2     Recent Labs   Lab Test 09/02/21  0644 09/01/21  1505 02/03/19  2102 12/12/18  1050 01/16/18  1247 01/16/18  0646 12/13/17  2236 04/18/16  0905 11/02/15  0912 11/01/15  0727 10/31/15  0810   MAG 2.4* 2.3 1.8 2.1 2.1   < > 2.0  --   --  2.0  --    PHOS  --   --   --   --   --   --  2.4* 3.2 3.2 2.1* 2.4*    < > = values in this interval not displayed.     Recent Labs   Lab Test 07/18/23  1158 02/27/23  1306 12/09/22  1000 10/08/22  1340 09/05/22  1446   WBC 6.4 8.4 7.7 10.6 8.3   HGB 12.9 12.8 13.3 12.0 11.6*    192 240 228 210   MCV 97 97 97 96 96   NEUTROPHIL 71 73 78 72 75     Recent Labs   Lab Test 07/18/23  1158 02/27/23  1306 12/09/22  1000 09/05/22  1446 05/31/22  2342 12/09/21  2356 11/19/21  0911 08/11/21  1031 09/11/18  0612 07/17/18  1346   BILITOTAL 0.5 0.4 0.5   < > 0.4   < > 0.5 0.4   < >  --    ALKPHOS 82 92 91   < > 103   < > 98 88   < >  --    ALT 11 19 19   < > 21   < > 23 17   < >  --    AST 22  --  27  --  20   < > 15 14   < >  --    ALBUMIN 4.2 3.9 4.2   < > 3.7   < > 3.4 3.5   < >  --    LDH  --   --   --   --   --   --  180 176  --  204    < >  = values in this interval not displayed.     TSH   Date Value Ref Range Status   06/22/2023 2.39 0.30 - 4.20 uIU/mL Final   12/09/2022 2.62 0.30 - 4.20 uIU/mL Final   05/20/2022 2.90 0.40 - 4.00 mU/L Final   09/02/2021 1.24 0.40 - 4.00 mU/L Final   07/05/2021 1.68 0.40 - 4.00 mU/L Final   05/27/2021 1.93 0.40 - 4.00 mU/L Final   01/27/2021 2.42 0.40 - 4.00 mU/L Final     No results for input(s): CEA in the last 18215 hours.  Results for orders placed or performed during the hospital encounter of 09/13/23   PET Oncology (Eyes to Thighs)    Narrative    Combined Report of: PET and CT on 9/13/2023 1:58 PM:    1. PET of the neck, chest, abdomen, and pelvis.  2. PET CT Fusion for Attenuation Correction and Anatomical  Localization.  3. Diagnostic CT of the chest, abdomen and pelvis with intravenous  contrast obtained for diagnostic interpretation.  4. 3D MIP and PET-CT fused images were processed on an independent  workstation and archived to PACS and reviewed by a radiologist.    Technique:     1. PET: The patient received 10.11 mCi of F-18-FDG. The serum glucose  was 121 mg/dL prior to administration. Body weight was 79 kg. Images  were evaluated in the axial, sagittal, and coronal planes as well as  the rotational whole body MIP. Images were acquired from the cranial  vertex to thighs.    UPTAKE WAS MEASURED AT 62 MINUTES.     BACKGROUND: Liver SUV max = 4.3, Aorta Blood SUV max = 3.2.     2. CT: Volumetric acquisition for clinical interpretation of the  chest, abdomen, and pelvis acquired at 3 mm sections. The chest,  abdomen, and pelvis were evaluated at 5 mm sections in bone, soft  tissue, and lung windows.    Contrast and Medications:  IV contrast: 95 mL of Isovue 370 intravenously.  PO contrast: none.  Additional Medications: None.    3. 3D MIP and PET-CT fused images were processed on an independent  workstation and archived to PACS and reviewed by a radiologist.    INDICATION: On surveillance after adjuvant  chemotherapy for stage IV  ([id number]) urothelial Ca of rt renal pelvis-(nephroureterectomy   pericaval node Sx 11/13/18); thickening of pleura; Urothelial  carcinoma (H); Graves disease; Pulmonary nodules.    ADDITIONAL INFORMATION OBTAINED FROM EMR: 89-year-old??woman with  stage IV ([id number]) transitional cell carcinoma of right renal pelvis  and non-muscle invasive bladder cancer. History of Graves disease.  Right thyroid nodule FNA with nondiagnostic result in 2007. PET/CT for  restaging.  - 11/13/2018- Right robotic nephroureterectomy, excision of sandip-caval  mass and LN Sandip-caval mass  - 12/12/18-02/13/19: adjuvant chemotherapy (carbo+gem)  - 1/13/20: Bladder biopsy result of high grade urothelial carcinoma  in-situ  - 2/12/20-3/18/20: Intravesical BCG therapy  - 11/25/20-12/10/2020: intravesical BCG 50mg    COMPARISON: PET/CT 12/11/2018; CT 6/12/2023, 12/9/2022, 11/19/2021,  5/11/2021; thyroid US 2/10/2023.      FINDINGS:     HEAD/NECK:  Increased uptake in the 1.7 x 1.5 cm hypoattenuating left thyroid  nodule with SUV max 7.5 (4/91), previously 1.1 x 0.8 cm with SUV max  4.7 on PET/CT dated 12/11/2018.    The paranasal sinuses are clear. The mastoid air cells are clear.  Bilateral pseudophakia.    The mucosal and deep spaces of the neck are unremarkable. The major  salivary glands are unremarkable. The major vasculature of the neck  are patent.    CHEST:  Moderate uptake in the 2.4 x 0.9 cm left pleural soft tissue lesion  with SUV max 6.2 (4/136), which has gradually increased in size when  it measured 2.3 x 0.7 cm on CT dated 12/19/2022 and new since CT dated  11/19/2021.    The central tracheobronchial tree is clear. No pleural effusion or  pneumothorax. No acute consolidation. Similar-appearing subcentimeter  pulmonary nodules, including 0.5 cm left upper lobe nodule (8/29).  Minimal bilateral dependent atelectasis.    Heart size is within normal limits. No pericardial effusion. Aortic  valvular  calcification. Atherosclerotic calcifications of the thoracic  aorta. The thoracic aorta and main pulmonary artery are within normal  limits for diameter. Moderate-sized hiatal hernia.    ABDOMEN AND PELVIS:  Abnormalities in the colon the most pronounced in the 10 cm segment  around the hepatic flexure which courses through the nephrectomy bed  with hypermetabolism max SUV 9.9 with loss of haustrations increased  vascularity and mucosal enhancement.     Limited evaluation of the urinary bladder secondary to radiotracer.  Hepatic steatosis. Cholecystectomy. Prominent extrahepatic biliary  ductal dilatation, likely secondary to age and postcholecystectomy.  The pancreas is unremarkable. No pancreatic ductal dilatation.  Splenule. The adrenal glands are unremarkable.    Right nephrectomy. No left hydronephrosis. The reproductive organs are  unremarkable. Pelvic phleboliths.   No free air or fluid. Normal caliber of the abdominal aorta with  atherosclerotic calcifications.    BONES:   No suspicious uptake in the skeleton. Degenerative uptake of the L4-5  and L5-S1 facet arthropathy. No suspicious lytic or blastic osseous  lesions. Multilevel degenerative changes and chronic wedge compression  deformities.        Impression    IMPRESSION: In this patient with right renal pelvis transitional cell  carcinoma s/p right nephrectomy and chemotherapy and bladder cancer  s/p BCG therapy:     1. Increased size of the hypermetabolic left pleural soft tissue  lesion, new since 2021, likely neoplastic process.     2. Increased hypermetabolism and size of the 1.7cm left thyroid nodule  when compared to PET/CT dated 12/11/2018 in this patient with known  Graves disease. Statistically ~50% chance of thyroid malignancy.     3. Limited evaluation of the urinary bladder secondary to radiotracer.  Can consider PET/CT with Lasix protocol for future evaluation.     4. Hypermetabolic 10 cm segment of colon near the hepatic flexure  (part of  which is in the right nephrectomy bed) with loss of  haustrations.   Differential inflammatory, infectious, ischemic,  and  malignant etiologies.    I have personally reviewed the examination and initial interpretation  and I agree with the findings.    ANNA DOWNEY MD         SYSTEM ID:  [id number]   CT Chest/Abdomen/Pelvis w Contrast    Narrative    Combined Report of: PET and CT on 9/13/2023 1:58 PM:    1. PET of the neck, chest, abdomen, and pelvis.  2. PET CT Fusion for Attenuation Correction and Anatomical  Localization.  3. Diagnostic CT of the chest, abdomen and pelvis with intravenous  contrast obtained for diagnostic interpretation.  4. 3D MIP and PET-CT fused images were processed on an independent  workstation and archived to PACS and reviewed by a radiologist.    Technique:     1. PET: The patient received 10.11 mCi of F-18-FDG. The serum glucose  was 121 mg/dL prior to administration. Body weight was 79 kg. Images  were evaluated in the axial, sagittal, and coronal planes as well as  the rotational whole body MIP. Images were acquired from the cranial  vertex to thighs.    UPTAKE WAS MEASURED AT 62 MINUTES.     BACKGROUND: Liver SUV max = 4.3, Aorta Blood SUV max = 3.2.     2. CT: Volumetric acquisition for clinical interpretation of the  chest, abdomen, and pelvis acquired at 3 mm sections. The chest,  abdomen, and pelvis were evaluated at 5 mm sections in bone, soft  tissue, and lung windows.    Contrast and Medications:  IV contrast: 95 mL of Isovue 370 intravenously.  PO contrast: none.  Additional Medications: None.    3. 3D MIP and PET-CT fused images were processed on an independent  workstation and archived to PACS and reviewed by a radiologist.    INDICATION: On surveillance after adjuvant chemotherapy for stage IV  ([id number]) urothelial Ca of rt renal pelvis-(nephroureterectomy   pericaval node Sx 11/13/18); thickening of pleura; Urothelial  carcinoma (H); Graves disease; Pulmonary  nodules.    ADDITIONAL INFORMATION OBTAINED FROM EMR: 89-year-old??woman with  stage IV ([id number]) transitional cell carcinoma of right renal pelvis  and non-muscle invasive bladder cancer. History of Graves disease.  Right thyroid nodule FNA with nondiagnostic result in 2007. PET/CT for  restaging.  - 11/13/2018- Right robotic nephroureterectomy, excision of sandip-caval  mass and LN Sandip-caval mass  - 12/12/18-02/13/19: adjuvant chemotherapy (carbo+gem)  - 1/13/20: Bladder biopsy result of high grade urothelial carcinoma  in-situ  - 2/12/20-3/18/20: Intravesical BCG therapy  - 11/25/20-12/10/2020: intravesical BCG 50mg    COMPARISON: PET/CT 12/11/2018; CT 6/12/2023, 12/9/2022, 11/19/2021,  5/11/2021; thyroid US 2/10/2023.      FINDINGS:     HEAD/NECK:  Increased uptake in the 1.7 x 1.5 cm hypoattenuating left thyroid  nodule with SUV max 7.5 (4/91), previously 1.1 x 0.8 cm with SUV max  4.7 on PET/CT dated 12/11/2018.    The paranasal sinuses are clear. The mastoid air cells are clear.  Bilateral pseudophakia.    The mucosal and deep spaces of the neck are unremarkable. The major  salivary glands are unremarkable. The major vasculature of the neck  are patent.    CHEST:  Moderate uptake in the 2.4 x 0.9 cm left pleural soft tissue lesion  with SUV max 6.2 (4/136), which has gradually increased in size when  it measured 2.3 x 0.7 cm on CT dated 12/19/2022 and new since CT dated  11/19/2021.    The central tracheobronchial tree is clear. No pleural effusion or  pneumothorax. No acute consolidation. Similar-appearing subcentimeter  pulmonary nodules, including 0.5 cm left upper lobe nodule (8/29).  Minimal bilateral dependent atelectasis.    Heart size is within normal limits. No pericardial effusion. Aortic  valvular calcification. Atherosclerotic calcifications of the thoracic  aorta. The thoracic aorta and main pulmonary artery are within normal  limits for diameter. Moderate-sized hiatal hernia.    ABDOMEN AND  PELVIS:  Abnormalities in the colon the most pronounced in the 10 cm segment  around the hepatic flexure which courses through the nephrectomy bed  with hypermetabolism max SUV 9.9 with loss of haustrations increased  vascularity and mucosal enhancement.     Limited evaluation of the urinary bladder secondary to radiotracer.  Hepatic steatosis. Cholecystectomy. Prominent extrahepatic biliary  ductal dilatation, likely secondary to age and postcholecystectomy.  The pancreas is unremarkable. No pancreatic ductal dilatation.  Splenule. The adrenal glands are unremarkable.    Right nephrectomy. No left hydronephrosis. The reproductive organs are  unremarkable. Pelvic phleboliths.   No free air or fluid. Normal caliber of the abdominal aorta with  atherosclerotic calcifications.    BONES:   No suspicious uptake in the skeleton. Degenerative uptake of the L4-5  and L5-S1 facet arthropathy. No suspicious lytic or blastic osseous  lesions. Multilevel degenerative changes and chronic wedge compression  deformities.        Impression    IMPRESSION: In this patient with right renal pelvis transitional cell  carcinoma s/p right nephrectomy and chemotherapy and bladder cancer  s/p BCG therapy:     1. Increased size of the hypermetabolic left pleural soft tissue  lesion, new since 2021, likely neoplastic process.     2. Increased hypermetabolism and size of the 1.7cm left thyroid nodule  when compared to PET/CT dated 12/11/2018 in this patient with known  Graves disease. Statistically ~50% chance of thyroid malignancy.     3. Limited evaluation of the urinary bladder secondary to radiotracer.  Can consider PET/CT with Lasix protocol for future evaluation.     4. Hypermetabolic 10 cm segment of colon near the hepatic flexure  (part of which is in the right nephrectomy bed) with loss of  haustrations.   Differential inflammatory, infectious, ischemic,  and  malignant etiologies.    I have personally reviewed the examination and  initial interpretation  and I agree with the findings.    ANNA DOWNEY MD         SYSTEM ID:  [id number]     *Note: Due to a large number of results and/or encounters for the requested time period, some results have not been displayed. A complete set of results can be found in Results Review.        ASSESSMENT AND PLAN: Ms. 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    She is being seen in person and is accompanied by her daughter from [facility]. She has been in good health. She is being seen with PETCT scans to further investigate a new pleural based lesion in left lung.    Her PET CT scan does show modest FDG uptake of 6.2 in the lesion of concern. While it has grown and is new from 2021, the change is merely 2 mm from last year December. It is hard to say for sure what it is without a biopsy. I reviewed CT guided pleural biopsy. I reviewed the risks, benefits and alternatives to a biopsy. I reviewed there risk of a pneumothorax.     In addition to the lung lesion, there if small FDG uptake in 10 cm of hepatic flexure of colon.     She also has an FDG avid thyroid nodule.     We reviewed the risks and benefits from biopsy of thee lesions. If the left lower lobe pleural based nodule is a malignancy, we could consider radiation for her. She would have options of stereotactic radiation with few fractions    Had she been 20 yrs younger, I would have pursued biopsies of lung, thyroid and colonoscopy.    She would personally favor monitoring at this time. I reviewed continued surveillance with scans every 6 months for the moment.     1. Upper tract urothelial cancer:   - She completed 4 cycles of adjuvant carboplatin plus gemcitabine chemotherapy per POUT trial.    She completed adjuvant chemotherapy in  Feb 2019 over 4 yrs ago  - No residual side effects or evidence of recurrence.  - Reviewed restaging CT scan from June 12th, 2023; there is no clear evidence of disease recurrence in abd/pelvis.   She continues to have pulmonary nodules which remain stable except for growth in one of the pulmonary nodule    She also has pleural nodule in left lower lobe.    Lung findings are non-specific and these are also not responsible for her shortness of breath    - I reviewed option of PET-CT scans vs biopsy. The definitive diagnosis would only come after a biopsy. However, PET-CT scan has much higher sensitivity and it would change level of suspicion for the biopsy.    - I will follow her in 2 months with labs and restaging PET-CT scan    2. High grade urothelial carcinoma in situ:  - Bx proven carcinoma in situ in 1/2020, completed the first round of intravesical BCG treatment 2/2020-3/2020 and second in 11/2020-12/2020.  - She was initially followed by Dr. King and now is under care of Dr. Froilan Henry.   - She did not have urine cytology done and I will get it after this visit.   - She will continue on 3 weekly BCG every 3 months as part of her maintenance along with follow up cystoscopies    3. Chemo induced anemia and thrombocytopenia:  - Resolved.      4. Persistent HERMAN:  - She follows Dr. Griffith in Cardiology for her h/o moderate AORTIC STENOSIS, CHF, and Afib now with worsening SOA and lymphedema with no cancer-related etiology evident.     Return to clinic in 6 months with restaging scans.    All of the above was explained to the patient in lay language and several questions were answered. They are in agreement with the plan.    Addendum: I have reviewed his case with my colleagues in interventional pulmonology.  They did agree with the above plan.  There will be substantial risks for pneumothorax even with a needle biopsy and probably excisional biopsy would have been a better choice has she been younger.   Continued surveillance appears to be the best strategy for her as long as she remains asymptomatic.  If she has any symptoms from her lesion we could consider radiation even without a biopsy.     45 minutes spent on the date of the encounter doing chart review, history and exam, documentation and further activities as noted above     Sd Fine    Hematologist and Medical Oncologist  [facility]

## 2023-09-19 NOTE — PROGRESS NOTES
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    HISTORY OF PRESENT ILLNESS:  Ms. 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 Her oncologic history is as under.    Ms. Oviedo is doing well. She denies any complains suggestive of recurrent disease.     She is here with her daughter and son in law in person. They have come from [facility] for this clinic visit. Her son Geoff also joined us over the phone.      ONCOLOGIC HISTORY:  1. High-grade, muscle-invasive, transitional cell carcinoma of right renal pelvis, stage IV ([id number]):  - Dec 2017- Started having gross hematuria.   - Jan 2018 to September 2018- Persistent gross hematuria and underwent multiple procedures.    - 2/19/18 and 4/3/18- cystoscopies with bladder biopsies  which were negative for bladder tumor  - 1/17/18, 3/8/18, 7/26/18, 9/10/18- Multiple urine cytologies have come back positive by FISH for high-grade transitional cell carcinoma  - 9/10/18- Underwent a bilateral cystoureteroscopy with biopsy and brushing that showed  right upper tract cytology suspicious for high-grade urothelial ca. Right ureter brushing negative from that day. Left upper tract negative.  - 9/10/18- CT A/P without contrast showed new small bilateral pleural effusions and interstitial pulmonary edema but no evidence of locoregional or metastatic disease.  - 9/12/18- Presented to ED with oliguria, CRESENCIO, and mild hydronephrosis bilaterally on CT scan and underwent bilateral ureteral stent placment  - 11/13/2018- Right robotic nephroureterectomy, excision of sandip-caval mass and LN excision by Stuart King. Pathology showed \"Right nephroureterectomy: Invasive high grade urothelial carcinoma " "measuring 3.5 cm, located in renal pelvis and invading through renal parenchyma into perinephric fat. Urothelial carcinoma in-situ present. Margins free of tumor. Ana-caval mass: Metastatic urothelial carcinoma involving one lymph node with at least 1 cm tumor deposit. Pericaval Extranodal Extension present. Five additional benign pericaval lymph nodes. Pathologic stage: pT4N1 (1 of 6 lymph nodes).\"  - 12/11/18- PET/CT whole body demonstrated significant residual FDG avid disease. For example, \"there is an FDG avid ill-defined pericaval mass immediately medial to the surgical clips measuring approximately 2.0 x 1.4 cm, with max SUV measuring up to12.2, see series 4 image 21. Additional FDG avid retrocaval and interaortocaval lymphadenopathy are noted.There is a 1.2 cm nodule in the right hemipelvis posterior lateral tothe bladder with max SUV measuring 8.3, see series 4 image 77. The bladder is incompletely distended.\" No suspicious thoracic or bone uptake.  - 12/12/18- Started adjuvant chemotherapy (carbo+gem) per POUT trial. Cycle 2 - 1/2/19. Cycle 3 - 1/23/19. Cycle 4 - 02/13/19.  - 1/22/19 - CT C/A/P with contrast compared with prior PET/CT: \"1. New well-circumscribed soft tissue pulmonary nodules of the right lower lobe and left upper lobe. Findings could be infectious, inflammatory, or represent metastatic disease. Continued attention on follow-up recommended. Postoperative changes of right nephrectomy. No evidence for local recurrence in the present study.\"  - 3/1/2019- CT C/A/P with contrast - \"1. Bilateral pulmonary nodules measuring up to 4 mm in the right lower lobe, previously measuring 8 mm. No new or enlarging pulmonary nodules. 2. No convincing evidence for metastatic disease in the abdomen, pelvis and bones.\"  - 6/3/2019- CT C/A/P with contrast - \"1. Surgical changes of right nephrectomy without evidence of residual or recurrent disease on this noncontrast exam.  Consider contrast enhanced " "examination on follow-up. 2. No evidence of metastatic disease in the abdomen, or pelvis on noncontrast CT.  Scattered tiny pulmonary nodules in the chest are not significantly changed.  Previously described prominent right lower lobe pulmonary nodule (previously measuring up to 8 mm) is no longer visualized. 3. Stable bilateral pulmonary nodules measuring maximally 4 mm.\"  - 09/11/19: CT C/A/P without contrast - \"1. Stable exam without evidence convincing for new disease. 2. Stable pulmonary nodules. 3. 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 4. Stable heterogeneous enlargement of the thyroid with underlying nodules suggested.\"  - 12/4/19: CT C/A/P without contrast - \"No acute change since prior exam. No evidence of recurrent or metastatic disease. Tiny lung nodules are stable. Prior right nephrectomy. Left renal artery aneurysm is stable.\"  - 04/08/20, 7/27/20: CT C/A/P with contrast - GABRIELLE.  - 01/12/21: CT C/A/P with contrast - \"1.  Slight increased size of a 6 mm left upper lobe nodule and new 4 mm linear opacity along the right major fissure. These are indeterminate but could represent progression of metastatic disease. 2.  Slight increased size of mildly enlarged mediastinal and hilar lymph nodes. 3.  Mild pulmonary edema. 4.  No recurrent or metastatic disease in the abdomen and pelvis. 5.  Mild stranding around the urinary bladder dome may be related to cystitis. Punctate focus of gas within the urinary bladder either secondary to infection or instrumentation. 6.  Enlarged multinodular thyroid gland.\"    2. Non-muscle invasive bladder cancer:  - 10/3/19: Cystoscopy showed a small area of erythema on retroflexion, possibly pre-existing or due to retroflexion of the scope. Urine cytology from that time showed cells suspicious for malignancy.   - 1/13/20: Bladder biopsy showed high grade urothelial carcinoma in-situ  - 2/12/20-3/18/20: Intravesical BCG therapy  - 7/24/20 and last cystoscopy was " on the same day. It was negative. However, urine cytology was positive for high-grade urothelial carcinoma.   - 11/25/20-12/10/2020: 3 weekly doses of intravesical BCG 50mg.   - 12/10/20: Cytology suspicious and FISH urovysion positive for TCC.    REVIEW OF SYSTEMS: 14 point ROS negative other than the symptoms noted above in the HPI.    PAST MEDICAL AND SURGICAL HISTORY:   Past Medical History:   Diagnosis Date    CRESENCIO (acute kidney injury) (H) 9/11/2018    Arthritis     Asthma 2/9/2022    Calculus of kidney     Chemotherapy-induced neutropenia (H) 3/6/2019    Congestive heart failure (H)     Esophageal reflux     GERD (gastroesophageal reflux disease)     Hyperlipidemia LDL goal <130 5/9/2010    Malignant melanoma of skin of trunk, except scrotum (H)     Nonspecific abnormal finding     has living will 2004 -     Nontoxic multinodular goiter     no further eval /tx rec per pt    Osteopenia     Other psoriasis     Personal history of colonic polyps     PMR (polymyalgia rheumatica) (H)     Restless legs syndrome 7/11/2018    Stress-induced cardiomyopathy     Undiagnosed cardiac murmurs     Unspecified constipation     Unspecified essential hypertension     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     Past Surgical History:   Procedure Laterality Date    ARTHROPLASTY KNEE Right 11/9/2020    Procedure: ARTHROPLASTY, KNEE, TOTAL, RIGHT;  Surgeon: Edward Otero MD;  [facility]    BIOPSY      COLONOSCOPY  2014    COMBINED CYSTOSCOPY, INSERT STENT URETER(S) Bilateral 9/12/2018    Procedure: COMBINED CYSTOSCOPY, INSERT STENT URETER(S);  Cystoscopy Bilateral ureteral Stent Placement.;  Surgeon: Justin Henry MD;  [facility]    COMBINED CYSTOSCOPY, RETROGRADES, URETEROSCOPY, INSERT STENT Bilateral 4/3/2018    Procedure: COMBINED CYSTOSCOPY, RETROGRADES, URETEROSCOPY, INSERT STENT;;  Surgeon: Stuart King MD;  [facility]    COMBINED CYSTOSCOPY, RETROGRADES, URETEROSCOPY, INSERT STENT  Bilateral 9/10/2018    Procedure: COMBINED CYSTOSCOPY, RETROGRADES, URETEROSCOPY, INSERT STENT;  Cystoscopy, Bilateral Ureteroscopy, Bladder Biopsies, Retrogram Pyelograms, Ureteral Washings and brushings, cysview;  Surgeon: Stuart King MD;  [facility]    COMBINED CYSTOSCOPY, RETROGRADES, URETEROSCOPY, INSERT STENT Left 8/26/2020    Procedure: Cystoscopy, Left Ureteral Wash, Left ureteral Retrograde Pyelogram;  Surgeon: Justin Henry MD;  [facility]    CYSTOSCOPY, BIOPSY BLADDER INSTILL OPTICAL AGENT N/A 4/3/2018    Procedure: CYSTOSCOPY, BIOPSY BLADDER INSTILL OPTICAL AGENT;  Cystoscopy, Blue Light Cystoscopy, Bladder Biopsies, Bilateral Selective ureteral washings for Cytology, Bilateral Retrograde Pyelograms, Bilateral Ureteroscopy;  Surgeon: Stuart King MD;  [facility]    CYSTOSCOPY, BIOPSY BLADDER, COMBINED N/A 2/19/2018    Procedure: COMBINED CYSTOSCOPY, BIOPSY BLADDER;  Cystoscopy, Bladder Biopsy;  Surgeon: Kenna La MD;  [facility]    CYSTOSCOPY, BIOPSY BLADDER, COMBINED N/A 8/26/2020    Procedure: Cystoscopy, biopsy bladder, combined;  Surgeon: Justin Henry MD;  [facility]    CYSTOSCOPY, FULGURATE BLADDER TUMOR, COMBINED N/A 1/13/2020    Procedure: CYSTOSCOPY, WITH  bladder biopsies and fulguration;  Surgeon: Stuart King MD;  [facility]    CYSTOSCOPY, REMOVE STENT(S), COMBINED  11/13/2018    Procedure: Flexible Cystoscopy with Stent Removal;  Surgeon: Stuart King MD;  [facility]    DAVINCI LYMPHADENECTOMY N/A 11/13/2018    Procedure: Davinci Lymphadenectomy ;  Surgeon: Stuart King MD;  [facility]    DAVINCI NEPHROURETERECTOMY N/A 11/13/2018    Procedure: Right DaVinci Assisted Nephroureterectomy;  Surgeon: Stuart King MD;  [facility]    ENDOSCOPIC ULTRASOUND LOWER GASTROINTESTIONAL TRACT (GI) N/A 10/30/2015    Procedure: ENDOSCOPIC ULTRASOUND LOWER  GASTROINTESTIONAL TRACT (GI);  Surgeon: Daniel Jean-Baptiste MD;  [facility]    EYE SURGERY  12/4/17    INSERT PORT VASCULAR ACCESS Right 12/19/2018    Procedure: Chest Port Placement - right;  Surgeon: Stuart Chavez PA-C;  [facility]    IR CHEST PORT PLACEMENT > 5 YRS OF AGE  12/19/2018    IR PORT REMOVAL RIGHT  6/26/2019    LAPAROSCOPIC CHOLECYSTECTOMY WITH CHOLANGIOGRAMS N/A 11/1/2015    Procedure: LAPAROSCOPIC CHOLECYSTECTOMY WITH CHOLANGIOGRAMS;  Surgeon: Tonie Warren MD;  [facility]    REMOVE PORT VASCULAR ACCESS Right 6/26/2019    Procedure: Right Port Removal;  Surgeon: Froilan Irizarry PA-C;  [facility]    SURGICAL HISTORY OF -   1996    malignant melanoma    SURGICAL HISTORY OF -   1968    thyroid nodule    SURGICAL HISTORY OF -       D & C    ZZC NONSPECIFIC PROCEDURE  2005    colonoscopy polyp repeat 2010     SOCIAL HISTORY:   Social History     Tobacco Use    Smoking status: Never     Passive exposure: Never    Smokeless tobacco: Never   Vaping Use    Vaping Use: Never used   Substance Use Topics    Alcohol use: No     Alcohol/week: 0.0 standard drinks of alcohol     Comment: rare    Drug use: No     FAMILY HISTORY:   Family History   Problem Relation Age of Onset    Cancer Father         dec - esophageal and laryngeal    Heart Disease Mother     Respiratory Mother         dec    Breast Cancer Daughter     Other Cancer Daughter     Thyroid Disease Daughter     Asthma Daughter     Hyperlipidemia Son     Diabetes Son     Anesthesia Reaction No family hx of     Deep Vein Thrombosis (DVT) No family hx of      ALLERGIES:   No Known Allergies  CURRENT MEDICATIONS:   Current Outpatient Medications:     alendronate (FOSAMAX) 70 MG tablet, TAKE 1 TABLET EVERY 7 DAYS AT LEAST 60 MINUTES BEFORE BREAKFAST AS DIRECTED., Disp: 12 tablet, Rfl: 2    diltiazem ER (DILT-XR) 180 MG 24 hr capsule, Take 1 capsule (180 mg) by mouth daily, Disp: 90 capsule, Rfl: 3    ferrous  sulfate 325 (65 Fe) MG TBEC EC tablet, Take 325 mg by mouth every morning , Disp: , Rfl:     furosemide (LASIX) 20 MG tablet, TAKE 1 TABLET TWICE DAILY IN THE MORNING  AND  AFTER  LUNCH, Disp: 180 tablet, Rfl: 3    irbesartan (AVAPRO) 300 MG tablet, Take 1 tablet (300 mg) by mouth daily, Disp: 90 tablet, Rfl: 3    levofloxacin (LEVAQUIN) 500 MG tablet, Take one tablet six hours after treatment and one tablet morning after treatment, Disp: 4 tablet, Rfl: 11    lovastatin (MEVACOR) 40 MG tablet, TAKE 1 TABLET AT BEDTIME (SCHEDULE ANNUAL VISIT), Disp: 90 tablet, Rfl: 0    methimazole (TAPAZOLE) 5 MG tablet, TAKE 1 TABLET ALTERNATING WITH 1/2 TABLET EVERY OTHER DAY, Disp: 66 tablet, Rfl: 3    Omega-3 Fatty Acids (FISH OIL) 500 MG CAPS, Take 1 capsule by mouth every morning , Disp: , Rfl:     omeprazole (PRILOSEC) 20 MG DR capsule, Take 1 capsule (20 mg) by mouth daily, Disp: 90 capsule, Rfl: 3    propranolol ER (INDERAL LA) 60 MG 24 hr capsule, TAKE 1 CAPSULE EVERY DAY, Disp: 90 capsule, Rfl: 3    RESTASIS 0.05 % ophthalmic emulsion, , Disp: , Rfl:     rivaroxaban ANTICOAGULANT (XARELTO) 10 MG TABS tablet, Take 1 tablet (10 mg) by mouth daily (with dinner), Disp: 30 tablet, Rfl: 0    rOPINIRole (REQUIP) 0.25 MG tablet, TAKE 1 TABLET IN THE AFTERNOON AND TAKE 2 TABLETS EVERY NIGHT, Disp: 270 tablet, Rfl: 3    sertraline (ZOLOFT) 100 MG tablet, Take 1 tablet (100 mg) by mouth every morning, Disp: 90 tablet, Rfl: 3    traZODone (DESYREL) 50 MG tablet, TAKE 1/2 TABLET AT BEDTIME, Disp: 45 tablet, Rfl: 3    levofloxacin (LEVAQUIN) 500 MG tablet, Take 1 tablet by mouth, Disp: 4 tablet, Rfl: 0    levofloxacin (LEVAQUIN) 500 MG tablet, Take 1 tablet  By mouth 2 hours before BCG infusion and 1 tablet by mouth 8 hours after BCG infusion for 3 treatments, Disp: 6 tablet, Rfl: 0    Current Facility-Administered Medications:     lidocaine (XYLOCAINE) 2 % external gel, , Urethral, Once, Justin Henry MD    PHYSICAL  EXAMINATION:  Deferred. Phone visit due to COVID.    LABORATORY DATA:   Recent Labs   Lab Test 09/13/23  1233 07/18/23  1158 06/12/23  1332 02/27/23  1306 12/09/22  1029 12/09/22  1000 10/08/22  1340 09/12/22  1036   NA  --  137  --  138  --  139 133* 135   POTASSIUM  --  4.7  --  4.8  --  4.4 4.0 4.3   CHLORIDE  --  100  --  100  --  101 96* 102   CO2  --  28  --  27  --  28 26 27   ANIONGAP  --  9  --  11  --  10 11 6   BUN  --  19.0  --  18.5  --  18.1 19.0 21   CR 1.1* 1.05* 0.9 0.96* 1.0 1.05* 0.91 0.99   GLC  --  144*  --  102*  --  132* 104* 116*   SHREYA  --  9.7*  --  9.8  --  9.4 9.6 9.2     Recent Labs   Lab Test 09/02/21  0644 09/01/21  1505 02/03/19  2102 12/12/18  1050 01/16/18  1247 01/16/18  0646 12/13/17  2236 04/18/16  0905 11/02/15  0912 11/01/15  0727 10/31/15  0810   MAG 2.4* 2.3 1.8 2.1 2.1   < > 2.0  --   --  2.0  --    PHOS  --   --   --   --   --   --  2.4* 3.2 3.2 2.1* 2.4*    < > = values in this interval not displayed.     Recent Labs   Lab Test 07/18/23  1158 02/27/23  1306 12/09/22  1000 10/08/22  1340 09/05/22  1446   WBC 6.4 8.4 7.7 10.6 8.3   HGB 12.9 12.8 13.3 12.0 11.6*    192 240 228 210   MCV 97 97 97 96 96   NEUTROPHIL 71 73 78 72 75     Recent Labs   Lab Test 07/18/23  1158 02/27/23  1306 12/09/22  1000 09/05/22  1446 05/31/22  2342 12/09/21  2356 11/19/21  0911 08/11/21  1031 09/11/18  0612 07/17/18  1346   BILITOTAL 0.5 0.4 0.5   < > 0.4   < > 0.5 0.4   < >  --    ALKPHOS 82 92 91   < > 103   < > 98 88   < >  --    ALT 11 19 19   < > 21   < > 23 17   < >  --    AST 22  --  27  --  20   < > 15 14   < >  --    ALBUMIN 4.2 3.9 4.2   < > 3.7   < > 3.4 3.5   < >  --    LDH  --   --   --   --   --   --  180 176  --  204    < > = values in this interval not displayed.     TSH   Date Value Ref Range Status   06/22/2023 2.39 0.30 - 4.20 uIU/mL Final   12/09/2022 2.62 0.30 - 4.20 uIU/mL Final   05/20/2022 2.90 0.40 - 4.00 mU/L Final   09/02/2021 1.24 0.40 - 4.00 mU/L Final    07/05/2021 1.68 0.40 - 4.00 mU/L Final   05/27/2021 1.93 0.40 - 4.00 mU/L Final   01/27/2021 2.42 0.40 - 4.00 mU/L Final     No results for input(s): CEA in the last [id number] hours.  Results for orders placed or performed during the hospital encounter of 09/13/23   PET Oncology (Eyes to Thighs)    Narrative    Combined Report of: PET and CT on 9/13/2023 1:58 PM:    1. PET of the neck, chest, abdomen, and pelvis.  2. PET CT Fusion for Attenuation Correction and Anatomical  Localization.  3. Diagnostic CT of the chest, abdomen and pelvis with intravenous  contrast obtained for diagnostic interpretation.  4. 3D MIP and PET-CT fused images were processed on an independent  workstation and archived to PACS and reviewed by a radiologist.    Technique:     1. PET: The patient received 10.11 mCi of F-18-FDG. The serum glucose  was 121 mg/dL prior to administration. Body weight was 79 kg. Images  were evaluated in the axial, sagittal, and coronal planes as well as  the rotational whole body MIP. Images were acquired from the cranial  vertex to thighs.    UPTAKE WAS MEASURED AT 62 MINUTES.     BACKGROUND: Liver SUV max = 4.3, Aorta Blood SUV max = 3.2.     2. CT: Volumetric acquisition for clinical interpretation of the  chest, abdomen, and pelvis acquired at 3 mm sections. The chest,  abdomen, and pelvis were evaluated at 5 mm sections in bone, soft  tissue, and lung windows.    Contrast and Medications:  IV contrast: 95 mL of Isovue 370 intravenously.  PO contrast: none.  Additional Medications: None.    3. 3D MIP and PET-CT fused images were processed on an independent  workstation and archived to PACS and reviewed by a radiologist.    INDICATION: On surveillance after adjuvant chemotherapy for stage IV  ([id number]) urothelial Ca of rt renal pelvis-(nephroureterectomy   pericaval node Sx 11/13/18); thickening of pleura; Urothelial  carcinoma (H); Graves disease; Pulmonary nodules.    ADDITIONAL INFORMATION OBTAINED FROM EMR:  89-year-old??woman with  stage IV ([id number]) transitional cell carcinoma of right renal pelvis  and non-muscle invasive bladder cancer. History of Graves disease.  Right thyroid nodule FNA with nondiagnostic result in 2007. PET/CT for  restaging.  - 11/13/2018- Right robotic nephroureterectomy, excision of sandip-caval  mass and LN Sandip-caval mass  - 12/12/18-02/13/19: adjuvant chemotherapy (carbo+gem)  - 1/13/20: Bladder biopsy result of high grade urothelial carcinoma  in-situ  - 2/12/20-3/18/20: Intravesical BCG therapy  - 11/25/20-12/10/2020: intravesical BCG 50mg    COMPARISON: PET/CT 12/11/2018; CT 6/12/2023, 12/9/2022, 11/19/2021,  5/11/2021; thyroid US 2/10/2023.      FINDINGS:     HEAD/NECK:  Increased uptake in the 1.7 x 1.5 cm hypoattenuating left thyroid  nodule with SUV max 7.5 (4/91), previously 1.1 x 0.8 cm with SUV max  4.7 on PET/CT dated 12/11/2018.    The paranasal sinuses are clear. The mastoid air cells are clear.  Bilateral pseudophakia.    The mucosal and deep spaces of the neck are unremarkable. The major  salivary glands are unremarkable. The major vasculature of the neck  are patent.    CHEST:  Moderate uptake in the 2.4 x 0.9 cm left pleural soft tissue lesion  with SUV max 6.2 (4/136), which has gradually increased in size when  it measured 2.3 x 0.7 cm on CT dated 12/19/2022 and new since CT dated  11/19/2021.    The central tracheobronchial tree is clear. No pleural effusion or  pneumothorax. No acute consolidation. Similar-appearing subcentimeter  pulmonary nodules, including 0.5 cm left upper lobe nodule (8/29).  Minimal bilateral dependent atelectasis.    Heart size is within normal limits. No pericardial effusion. Aortic  valvular calcification. Atherosclerotic calcifications of the thoracic  aorta. The thoracic aorta and main pulmonary artery are within normal  limits for diameter. Moderate-sized hiatal hernia.    ABDOMEN AND PELVIS:  Abnormalities in the colon the most pronounced in  the 10 cm segment  around the hepatic flexure which courses through the nephrectomy bed  with hypermetabolism max SUV 9.9 with loss of haustrations increased  vascularity and mucosal enhancement.     Limited evaluation of the urinary bladder secondary to radiotracer.  Hepatic steatosis. Cholecystectomy. Prominent extrahepatic biliary  ductal dilatation, likely secondary to age and postcholecystectomy.  The pancreas is unremarkable. No pancreatic ductal dilatation.  Splenule. The adrenal glands are unremarkable.    Right nephrectomy. No left hydronephrosis. The reproductive organs are  unremarkable. Pelvic phleboliths.   No free air or fluid. Normal caliber of the abdominal aorta with  atherosclerotic calcifications.    BONES:   No suspicious uptake in the skeleton. Degenerative uptake of the L4-5  and L5-S1 facet arthropathy. No suspicious lytic or blastic osseous  lesions. Multilevel degenerative changes and chronic wedge compression  deformities.        Impression    IMPRESSION: In this patient with right renal pelvis transitional cell  carcinoma s/p right nephrectomy and chemotherapy and bladder cancer  s/p BCG therapy:     1. Increased size of the hypermetabolic left pleural soft tissue  lesion, new since 2021, likely neoplastic process.     2. Increased hypermetabolism and size of the 1.7cm left thyroid nodule  when compared to PET/CT dated 12/11/2018 in this patient with known  Graves disease. Statistically ~50% chance of thyroid malignancy.     3. Limited evaluation of the urinary bladder secondary to radiotracer.  Can consider PET/CT with Lasix protocol for future evaluation.     4. Hypermetabolic 10 cm segment of colon near the hepatic flexure  (part of which is in the right nephrectomy bed) with loss of  haustrations.   Differential inflammatory, infectious, ischemic,  and  malignant etiologies.    I have personally reviewed the examination and initial interpretation  and I agree with the  findings.    ANNA DOWNEY MD         SYSTEM ID:  [id number]   CT Chest/Abdomen/Pelvis w Contrast    Narrative    Combined Report of: PET and CT on 9/13/2023 1:58 PM:    1. PET of the neck, chest, abdomen, and pelvis.  2. PET CT Fusion for Attenuation Correction and Anatomical  Localization.  3. Diagnostic CT of the chest, abdomen and pelvis with intravenous  contrast obtained for diagnostic interpretation.  4. 3D MIP and PET-CT fused images were processed on an independent  workstation and archived to PACS and reviewed by a radiologist.    Technique:     1. PET: The patient received 10.11 mCi of F-18-FDG. The serum glucose  was 121 mg/dL prior to administration. Body weight was 79 kg. Images  were evaluated in the axial, sagittal, and coronal planes as well as  the rotational whole body MIP. Images were acquired from the cranial  vertex to thighs.    UPTAKE WAS MEASURED AT 62 MINUTES.     BACKGROUND: Liver SUV max = 4.3, Aorta Blood SUV max = 3.2.     2. CT: Volumetric acquisition for clinical interpretation of the  chest, abdomen, and pelvis acquired at 3 mm sections. The chest,  abdomen, and pelvis were evaluated at 5 mm sections in bone, soft  tissue, and lung windows.    Contrast and Medications:  IV contrast: 95 mL of Isovue 370 intravenously.  PO contrast: none.  Additional Medications: None.    3. 3D MIP and PET-CT fused images were processed on an independent  workstation and archived to PACS and reviewed by a radiologist.    INDICATION: On surveillance after adjuvant chemotherapy for stage IV  ([id number]) urothelial Ca of rt renal pelvis-(nephroureterectomy   pericaval node Sx 11/13/18); thickening of pleura; Urothelial  carcinoma (H); Graves disease; Pulmonary nodules.    ADDITIONAL INFORMATION OBTAINED FROM EMR: 89-year-old??woman with  stage IV ([id number]) transitional cell carcinoma of right renal pelvis  and non-muscle invasive bladder cancer. History of Graves disease.  Right thyroid nodule FNA with  nondiagnostic result in 2007. PET/CT for  restaging.  - 11/13/2018- Right robotic nephroureterectomy, excision of sandip-caval  mass and LN Sandip-caval mass  - 12/12/18-02/13/19: adjuvant chemotherapy (carbo+gem)  - 1/13/20: Bladder biopsy result of high grade urothelial carcinoma  in-situ  - 2/12/20-3/18/20: Intravesical BCG therapy  - 11/25/20-12/10/2020: intravesical BCG 50mg    COMPARISON: PET/CT 12/11/2018; CT 6/12/2023, 12/9/2022, 11/19/2021,  5/11/2021; thyroid US 2/10/2023.      FINDINGS:     HEAD/NECK:  Increased uptake in the 1.7 x 1.5 cm hypoattenuating left thyroid  nodule with SUV max 7.5 (4/91), previously 1.1 x 0.8 cm with SUV max  4.7 on PET/CT dated 12/11/2018.    The paranasal sinuses are clear. The mastoid air cells are clear.  Bilateral pseudophakia.    The mucosal and deep spaces of the neck are unremarkable. The major  salivary glands are unremarkable. The major vasculature of the neck  are patent.    CHEST:  Moderate uptake in the 2.4 x 0.9 cm left pleural soft tissue lesion  with SUV max 6.2 (4/136), which has gradually increased in size when  it measured 2.3 x 0.7 cm on CT dated 12/19/2022 and new since CT dated  11/19/2021.    The central tracheobronchial tree is clear. No pleural effusion or  pneumothorax. No acute consolidation. Similar-appearing subcentimeter  pulmonary nodules, including 0.5 cm left upper lobe nodule (8/29).  Minimal bilateral dependent atelectasis.    Heart size is within normal limits. No pericardial effusion. Aortic  valvular calcification. Atherosclerotic calcifications of the thoracic  aorta. The thoracic aorta and main pulmonary artery are within normal  limits for diameter. Moderate-sized hiatal hernia.    ABDOMEN AND PELVIS:  Abnormalities in the colon the most pronounced in the 10 cm segment  around the hepatic flexure which courses through the nephrectomy bed  with hypermetabolism max SUV 9.9 with loss of haustrations increased  vascularity and mucosal  enhancement.     Limited evaluation of the urinary bladder secondary to radiotracer.  Hepatic steatosis. Cholecystectomy. Prominent extrahepatic biliary  ductal dilatation, likely secondary to age and postcholecystectomy.  The pancreas is unremarkable. No pancreatic ductal dilatation.  Splenule. The adrenal glands are unremarkable.    Right nephrectomy. No left hydronephrosis. The reproductive organs are  unremarkable. Pelvic phleboliths.   No free air or fluid. Normal caliber of the abdominal aorta with  atherosclerotic calcifications.    BONES:   No suspicious uptake in the skeleton. Degenerative uptake of the L4-5  and L5-S1 facet arthropathy. No suspicious lytic or blastic osseous  lesions. Multilevel degenerative changes and chronic wedge compression  deformities.        Impression    IMPRESSION: In this patient with right renal pelvis transitional cell  carcinoma s/p right nephrectomy and chemotherapy and bladder cancer  s/p BCG therapy:     1. Increased size of the hypermetabolic left pleural soft tissue  lesion, new since 2021, likely neoplastic process.     2. Increased hypermetabolism and size of the 1.7cm left thyroid nodule  when compared to PET/CT dated 12/11/2018 in this patient with known  Graves disease. Statistically ~50% chance of thyroid malignancy.     3. Limited evaluation of the urinary bladder secondary to radiotracer.  Can consider PET/CT with Lasix protocol for future evaluation.     4. Hypermetabolic 10 cm segment of colon near the hepatic flexure  (part of which is in the right nephrectomy bed) with loss of  haustrations.   Differential inflammatory, infectious, ischemic,  and  malignant etiologies.    I have personally reviewed the examination and initial interpretation  and I agree with the findings.    ANNA DOWNEY MD         SYSTEM ID:  [id number]     *Note: Due to a large number of results and/or encounters for the requested time period, some results have not been displayed. A  complete set of results can be found in Results Review.        ASSESSMENT AND PLAN: Ms. 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    She is being seen in person and is accompanied by her daughter from [facility]. She has been in good health. She is being seen with PETCT scans to further investigate a new pleural based lesion in left lung.    Her PET CT scan does show modest FDG uptake of 6.2 in the lesion of concern. While it has grown and is new from 2021, the change is merely 2 mm from last year December. It is hard to say for sure what it is without a biopsy. I reviewed CT guided pleural biopsy. I reviewed the risks, benefits and alternatives to a biopsy. I reviewed there risk of a pneumothorax.     In addition to the lung lesion, there if small FDG uptake in 10 cm of hepatic flexure of colon.     She also has an FDG avid thyroid nodule.     We reviewed the risks and benefits from biopsy of thee lesions. If the left lower lobe pleural based nodule is a malignancy, we could consider radiation for her. She would have options of stereotactic radiation with few fractions    Had she been 20 yrs younger, I would have pursued biopsies of lung, thyroid and colonoscopy.    She would personally favor monitoring at this time. I reviewed continued surveillance with scans every 6 months for the moment.     1. Upper tract urothelial cancer:   - She completed 4 cycles of adjuvant carboplatin plus gemcitabine chemotherapy per POUT trial.    She completed adjuvant chemotherapy in Feb 2019 over 4 yrs ago  - No residual side effects or evidence of recurrence.  - Reviewed restaging CT scan from June 12th, 2023; there is no clear evidence of disease recurrence in abd/pelvis.   She continues to have pulmonary nodules which  remain stable except for growth in one of the pulmonary nodule    She also has pleural nodule in left lower lobe.    Lung findings are non-specific and these are also not responsible for her shortness of breath    - I reviewed option of PET-CT scans vs biopsy. The definitive diagnosis would only come after a biopsy. However, PET-CT scan has much higher sensitivity and it would change level of suspicion for the biopsy.    - I will follow her in 2 months with labs and restaging PET-CT scan    2. High grade urothelial carcinoma in situ:  - Bx proven carcinoma in situ in 1/2020, completed the first round of intravesical BCG treatment 2/2020-3/2020 and second in 11/2020-12/2020.  - She was initially followed by Dr. King and now is under care of Dr. Froilan Henry.   - She did not have urine cytology done and I will get it after this visit.   - She will continue on 3 weekly BCG every 3 months as part of her maintenance along with follow up cystoscopies    3. Chemo induced anemia and thrombocytopenia:  - Resolved.      4. Persistent HERMAN:  - She follows Dr. Griffith in Cardiology for her h/o moderate AORTIC STENOSIS, CHF, and Afib now with worsening SOA and lymphedema with no cancer-related etiology evident.     Return to clinic in 6 months with restaging scans.    All of the above was explained to the patient in lay language and several questions were answered. They are in agreement with the plan.    Addendum: I have reviewed his case with my colleagues in interventional pulmonology.  They did agree with the above plan.  There will be substantial risks for pneumothorax even with a needle biopsy and probably excisional biopsy would have been a better choice has she been younger.  Continued surveillance appears to be the best strategy for her as long as she remains asymptomatic.  If she has any symptoms from her lesion we could consider radiation even without a biopsy.     45 minutes spent on the date of the encounter doing  chart review, history and exam, documentation and further activities as noted above     Sd Fine    Hematologist and Medical Oncologist  [facility]

## 2023-09-19 NOTE — PROGRESS NOTES
[facility]: Cancer Care                                                                                      RNCC met patient, her daughter and son- in law.  Answered questions about social work and arranged for  Elsie to call them later today on [facility] home line.    Ema Tang, RN, BSN  Oncology RN Care Coordinator  [facility]

## 2023-10-06 ENCOUNTER — TELEPHONE (OUTPATIENT)
Dept: CARDIOLOGY | Facility: CLINIC | Age: 88
End: 2023-10-06
Payer: MEDICARE

## 2023-10-13 DIAGNOSIS — E78.5 HYPERLIPIDEMIA LDL GOAL <130: ICD-10-CM

## 2023-10-17 RX ORDER — LOVASTATIN 40 MG
TABLET ORAL
Qty: 90 TABLET | Refills: 0 | Status: SHIPPED | OUTPATIENT
Start: 2023-10-17 | End: 2023-11-08

## 2023-11-08 ENCOUNTER — OFFICE VISIT (OUTPATIENT)
Dept: FAMILY MEDICINE | Facility: CLINIC | Age: 88
End: 2023-11-08
Payer: MEDICARE

## 2023-11-08 VITALS
BODY MASS INDEX: 38.19 KG/M2 | WEIGHT: 177 LBS | SYSTOLIC BLOOD PRESSURE: 140 MMHG | RESPIRATION RATE: 20 BRPM | OXYGEN SATURATION: 96 % | TEMPERATURE: 96.9 F | HEART RATE: 65 BPM | HEIGHT: 57 IN | DIASTOLIC BLOOD PRESSURE: 82 MMHG

## 2023-11-08 DIAGNOSIS — R73.09 ELEVATED GLUCOSE: ICD-10-CM

## 2023-11-08 DIAGNOSIS — E78.5 HYPERLIPIDEMIA LDL GOAL <130: ICD-10-CM

## 2023-11-08 DIAGNOSIS — K92.0 DARK EMESIS: ICD-10-CM

## 2023-11-08 DIAGNOSIS — E66.01 MORBID OBESITY (H): ICD-10-CM

## 2023-11-08 DIAGNOSIS — C68.9 UROTHELIAL CARCINOMA (H): Primary | ICD-10-CM

## 2023-11-08 LAB
ALBUMIN SERPL BCG-MCNC: 4.2 G/DL (ref 3.5–5.2)
ALP SERPL-CCNC: 89 U/L (ref 35–104)
ALT SERPL W P-5'-P-CCNC: 17 U/L (ref 0–50)
ANION GAP SERPL CALCULATED.3IONS-SCNC: 11 MMOL/L (ref 7–15)
AST SERPL W P-5'-P-CCNC: 22 U/L (ref 0–45)
BILIRUB SERPL-MCNC: 0.5 MG/DL
BUN SERPL-MCNC: 20.2 MG/DL (ref 8–23)
CALCIUM SERPL-MCNC: 9.6 MG/DL (ref 8.2–9.6)
CHLORIDE SERPL-SCNC: 100 MMOL/L (ref 98–107)
CHOLEST SERPL-MCNC: 150 MG/DL
CREAT SERPL-MCNC: 1.01 MG/DL (ref 0.51–0.95)
DEPRECATED HCO3 PLAS-SCNC: 27 MMOL/L (ref 22–29)
EGFRCR SERPLBLD CKD-EPI 2021: 53 ML/MIN/1.73M2
ERYTHROCYTE [DISTWIDTH] IN BLOOD BY AUTOMATED COUNT: 13.8 % (ref 10–15)
GLUCOSE SERPL-MCNC: 94 MG/DL (ref 70–99)
HBA1C MFR BLD: 6.1 % (ref 0–5.6)
HCT VFR BLD AUTO: 38.7 % (ref 35–47)
HDLC SERPL-MCNC: 46 MG/DL
HGB BLD-MCNC: 12.3 G/DL (ref 11.7–15.7)
LDLC SERPL CALC-MCNC: 76 MG/DL
MCH RBC QN AUTO: 31.1 PG (ref 26.5–33)
MCHC RBC AUTO-ENTMCNC: 31.8 G/DL (ref 31.5–36.5)
MCV RBC AUTO: 98 FL (ref 78–100)
NONHDLC SERPL-MCNC: 104 MG/DL
PLATELET # BLD AUTO: 201 10E3/UL (ref 150–450)
POTASSIUM SERPL-SCNC: 4.5 MMOL/L (ref 3.4–5.3)
PROT SERPL-MCNC: 7.2 G/DL (ref 6.4–8.3)
RBC # BLD AUTO: 3.95 10E6/UL (ref 3.8–5.2)
SODIUM SERPL-SCNC: 138 MMOL/L (ref 135–145)
TRIGL SERPL-MCNC: 140 MG/DL
WBC # BLD AUTO: 7.8 10E3/UL (ref 4–11)

## 2023-11-08 PROCEDURE — 99214 OFFICE O/P EST MOD 30 MIN: CPT | Performed by: FAMILY MEDICINE

## 2023-11-08 PROCEDURE — 83036 HEMOGLOBIN GLYCOSYLATED A1C: CPT | Performed by: FAMILY MEDICINE

## 2023-11-08 PROCEDURE — 80053 COMPREHEN METABOLIC PANEL: CPT | Performed by: FAMILY MEDICINE

## 2023-11-08 PROCEDURE — 80061 LIPID PANEL: CPT | Performed by: FAMILY MEDICINE

## 2023-11-08 PROCEDURE — 36415 COLL VENOUS BLD VENIPUNCTURE: CPT | Performed by: FAMILY MEDICINE

## 2023-11-08 PROCEDURE — 85027 COMPLETE CBC AUTOMATED: CPT | Performed by: FAMILY MEDICINE

## 2023-11-08 RX ORDER — LOVASTATIN 40 MG
TABLET ORAL
Qty: 90 TABLET | Refills: 0 | Status: SHIPPED | OUTPATIENT
Start: 2024-01-01 | End: 2024-02-08

## 2023-11-08 RX ORDER — RESPIRATORY SYNCYTIAL VIRUS VACCINE 120MCG/0.5
0.5 KIT INTRAMUSCULAR ONCE
Qty: 1 EACH | Refills: 0 | Status: CANCELLED | OUTPATIENT
Start: 2023-11-08 | End: 2023-11-08

## 2023-11-08 ASSESSMENT — ASTHMA QUESTIONNAIRES: ACT_TOTALSCORE: 22

## 2023-11-08 NOTE — PROGRESS NOTES
"  Assessment & Plan     Urothelial carcinoma (H)  Seeing oncologist. Stage IV high grade muscle invasive carcinoma. Some changes on recent pet scan but currently having close monitoring.     Hyperlipidemia LDL goal <130  Not sure about long term benefit with statin for her age specially with her current co morbidities, we discussed but as she is tolerating it well we agreed not to adjust it today. Patient and son in agreement. OK to stop it anytime in future when ready.   - lovastatin (MEVACOR) 40 MG tablet; TAKE 1 TABLET AT BEDTIME (SCHEDULE ANNUAL VISIT, NEED FASTING LABS)  - Lipid panel reflex to direct LDL Non-fasting; Future  - Comprehensive metabolic panel (BMP + Alb, Alk Phos, ALT, AST, Total. Bili, TP); Future  - Lipid panel reflex to direct LDL Non-fasting  - Comprehensive metabolic panel (BMP + Alb, Alk Phos, ALT, AST, Total. Bili, TP)    Morbid obesity (H)  Body mass index is 38.3 kg/m .  With risk factors. Limited mobility. Continue to work on diet, exercise as tolerated.     Elevated glucose     - Comprehensive metabolic panel (BMP + Alb, Alk Phos, ALT, AST, Total. Bili, TP); Future  - Hemoglobin A1c; Future  - Comprehensive metabolic panel (BMP + Alb, Alk Phos, ALT, AST, Total. Bili, TP)  - Hemoglobin A1c    Dark emesis  One time. In the past same thing. On xarelto. Start with cbc as currently stable. Will consider further work up if needed.    - CBC with platelets; Future  - CBC with platelets             BMI:   Estimated body mass index is 38.3 kg/m  as calculated from the following:    Height as of this encounter: 1.448 m (4' 9\").    Weight as of this encounter: 80.3 kg (177 lb).   Weight management plan: Discussed healthy diet and exercise guidelines         Pop Zapien MD, MD  [facility]    Davey Musa is a 89 year old, presenting for the following health issues:  Follow Up (SOB, a week ago pt vomited black stuff. Pt would like pcp to look at a " "mole on the back. Recheck medication.)      11/8/2023     1:52 PM   Additional Questions   Roomed by Dennis   Accompanied by son Geoff     History of Present Illness       Back Pain:  She presents for follow up of back pain. Patient's back pain is a chronic problem.  Location of back pain:  Right lower back and left lower back  Description of back pain: dull ache  Back pain spreads: nowhere    Since patient first noticed back pain, pain is: always present, but gets better and worse  Does back pain interfere with her job:  No       CKD: She uses over the counter pain medication, including tylenol, one time daily.    Hypertension: She presents for follow up of hypertension.  She does check blood pressure  regularly outside of the clinic. Outpatient blood pressures have not been over 140/90. She follows a low salt diet.     She eats 4 or more servings of fruits and vegetables daily.She consumes 0 sweetened beverage(s) daily.She exercises with enough effort to increase her heart rate 9 or less minutes per day.  She exercises with enough effort to increase her heart rate 3 or less days per week.   She is taking medications regularly.     Short of breath lot of time.     Been to oncology. Had PET scan.     Retaining fluids. Using compression socks.     Lasix twice per day.     Using xarelto. Using zoloft. Trazodone for sleep.     A week ago - sick to stomach. Threw up black stuff - takes iron pill daily. No episode since then.     Review of Systems         Objective    BP (!) 140/82 (BP Location: Right arm, Patient Position: Sitting, Cuff Size: Adult Regular)   Pulse 65   Temp 96.9  F (36.1  C) (Temporal)   Resp 20   Ht 1.448 m (4' 9\")   Wt 80.3 kg (177 lb)   SpO2 96%   BMI 38.30 kg/m    Body mass index is 38.3 kg/m .  Physical Exam   Compression socks both lower limbs, diffuse non pitting edema,  Walking with walker  Not in acute distress.                       "

## 2023-11-13 ENCOUNTER — HOSPITAL ENCOUNTER (EMERGENCY)
Facility: CLINIC | Age: 88
Discharge: HOME | End: 2023-11-14
Attending: EMERGENCY MEDICINE | Admitting: EMERGENCY MEDICINE
Payer: MEDICARE

## 2023-11-13 VITALS
SYSTOLIC BLOOD PRESSURE: 170 MMHG | RESPIRATION RATE: 18 BRPM | TEMPERATURE: 98 F | DIASTOLIC BLOOD PRESSURE: 77 MMHG | OXYGEN SATURATION: 94 % | HEART RATE: 87 BPM

## 2023-11-13 DIAGNOSIS — K92.0 DARK EMESIS: ICD-10-CM

## 2023-11-13 LAB
ABO/RH(D): NORMAL
ALBUMIN SERPL BCG-MCNC: 4 G/DL (ref 3.5–5.2)
ALBUMIN UR-MCNC: NEGATIVE MG/DL
ALP SERPL-CCNC: ABNORMAL U/L
ALT SERPL W P-5'-P-CCNC: ABNORMAL U/L
ANION GAP SERPL CALCULATED.3IONS-SCNC: 29 MMOL/L (ref 7–15)
ANTIBODY SCREEN: NEGATIVE
APPEARANCE UR: CLEAR
APTT PPP: 67 SECONDS (ref 22–38)
AST SERPL W P-5'-P-CCNC: ABNORMAL U/L
BACTERIA #/AREA URNS HPF: ABNORMAL /HPF
BASOPHILS # BLD AUTO: 0.1 10E3/UL (ref 0–0.2)
BASOPHILS NFR BLD AUTO: 1 %
BILIRUB SERPL-MCNC: 0.5 MG/DL
BILIRUB UR QL STRIP: NEGATIVE
BUN SERPL-MCNC: 15 MG/DL (ref 8–23)
CALCIUM SERPL-MCNC: 9.1 MG/DL (ref 8.2–9.6)
CHLORIDE SERPL-SCNC: 95 MMOL/L (ref 98–107)
COLOR UR AUTO: ABNORMAL
CREAT SERPL-MCNC: 0.84 MG/DL (ref 0.51–0.95)
DEPRECATED HCO3 PLAS-SCNC: 22 MMOL/L (ref 22–29)
EGFRCR SERPLBLD CKD-EPI 2021: 66 ML/MIN/1.73M2
EOSINOPHIL # BLD AUTO: 0.1 10E3/UL (ref 0–0.7)
EOSINOPHIL NFR BLD AUTO: 2 %
ERYTHROCYTE [DISTWIDTH] IN BLOOD BY AUTOMATED COUNT: 13.8 % (ref 10–15)
GLUCOSE SERPL-MCNC: 93 MG/DL (ref 70–99)
GLUCOSE UR STRIP-MCNC: NEGATIVE MG/DL
HCT VFR BLD AUTO: 41 % (ref 35–47)
HGB BLD-MCNC: 13 G/DL (ref 11.7–15.7)
HGB UR QL STRIP: NEGATIVE
IMM GRANULOCYTES # BLD: 0 10E3/UL
IMM GRANULOCYTES NFR BLD: 1 %
INR PPP: 2.57 (ref 0.85–1.15)
KETONES UR STRIP-MCNC: NEGATIVE MG/DL
LACTATE SERPL-SCNC: 1.5 MMOL/L (ref 0.7–2)
LEUKOCYTE ESTERASE UR QL STRIP: NEGATIVE
LIPASE SERPL-CCNC: 31 U/L (ref 13–60)
LYMPHOCYTES # BLD AUTO: 1.3 10E3/UL (ref 0.8–5.3)
LYMPHOCYTES NFR BLD AUTO: 17 %
MCH RBC QN AUTO: 31.1 PG (ref 26.5–33)
MCHC RBC AUTO-ENTMCNC: 31.7 G/DL (ref 31.5–36.5)
MCV RBC AUTO: 98 FL (ref 78–100)
MONOCYTES # BLD AUTO: 0.7 10E3/UL (ref 0–1.3)
MONOCYTES NFR BLD AUTO: 9 %
NEUTROPHILS # BLD AUTO: 5.2 10E3/UL (ref 1.6–8.3)
NEUTROPHILS NFR BLD AUTO: 70 %
NITRATE UR QL: NEGATIVE
NRBC # BLD AUTO: 0 10E3/UL
NRBC BLD AUTO-RTO: 0 /100
PH UR STRIP: 7 [PH] (ref 5–7)
PLATELET # BLD AUTO: 221 10E3/UL (ref 150–450)
POTASSIUM SERPL-SCNC: 5.3 MMOL/L (ref 3.4–5.3)
PROT SERPL-MCNC: 7.2 G/DL (ref 6.4–8.3)
RBC # BLD AUTO: 4.18 10E6/UL (ref 3.8–5.2)
RBC URINE: <1 /HPF
SODIUM SERPL-SCNC: 146 MMOL/L (ref 135–145)
SP GR UR STRIP: 1 (ref 1–1.03)
SPECIMEN EXPIRATION DATE: NORMAL
UROBILINOGEN UR STRIP-MCNC: NORMAL MG/DL
WBC # BLD AUTO: 7.5 10E3/UL (ref 4–11)
WBC URINE: 1 /HPF

## 2023-11-13 PROCEDURE — 84155 ASSAY OF PROTEIN SERUM: CPT | Performed by: EMERGENCY MEDICINE

## 2023-11-13 PROCEDURE — 36415 COLL VENOUS BLD VENIPUNCTURE: CPT | Performed by: EMERGENCY MEDICINE

## 2023-11-13 PROCEDURE — 85730 THROMBOPLASTIN TIME PARTIAL: CPT | Performed by: EMERGENCY MEDICINE

## 2023-11-13 PROCEDURE — 83690 ASSAY OF LIPASE: CPT | Performed by: EMERGENCY MEDICINE

## 2023-11-13 PROCEDURE — 81001 URINALYSIS AUTO W/SCOPE: CPT | Performed by: EMERGENCY MEDICINE

## 2023-11-13 PROCEDURE — 86901 BLOOD TYPING SEROLOGIC RH(D): CPT | Performed by: EMERGENCY MEDICINE

## 2023-11-13 PROCEDURE — 99283 EMERGENCY DEPT VISIT LOW MDM: CPT | Mod: 25

## 2023-11-13 PROCEDURE — 258N000003 HC RX IP 258 OP 636: Performed by: EMERGENCY MEDICINE

## 2023-11-13 PROCEDURE — 83605 ASSAY OF LACTIC ACID: CPT | Performed by: EMERGENCY MEDICINE

## 2023-11-13 PROCEDURE — 85025 COMPLETE CBC W/AUTO DIFF WBC: CPT | Performed by: EMERGENCY MEDICINE

## 2023-11-13 PROCEDURE — 85610 PROTHROMBIN TIME: CPT | Performed by: EMERGENCY MEDICINE

## 2023-11-13 PROCEDURE — 96360 HYDRATION IV INFUSION INIT: CPT

## 2023-11-13 PROCEDURE — 99284 EMERGENCY DEPT VISIT MOD MDM: CPT | Performed by: EMERGENCY MEDICINE

## 2023-11-13 RX ADMIN — SODIUM CHLORIDE 500 ML: 9 INJECTION, SOLUTION INTRAVENOUS at 22:33

## 2023-11-13 ASSESSMENT — ACTIVITIES OF DAILY LIVING (ADL)
ADLS_ACUITY_SCORE: 37
ADLS_ACUITY_SCORE: 37

## 2023-11-14 LAB
ALBUMIN SERPL BCG-MCNC: 4.1 G/DL (ref 3.5–5.2)
ALP SERPL-CCNC: 86 U/L (ref 35–104)
ALT SERPL W P-5'-P-CCNC: 21 U/L (ref 0–50)
ANION GAP SERPL CALCULATED.3IONS-SCNC: 13 MMOL/L (ref 7–15)
AST SERPL W P-5'-P-CCNC: ABNORMAL U/L
B-OH-BUTYR SERPL-SCNC: <0.18 MMOL/L
BASOPHILS # BLD AUTO: 0.1 10E3/UL (ref 0–0.2)
BASOPHILS NFR BLD AUTO: 1 %
BILIRUB SERPL-MCNC: 0.5 MG/DL
BUN SERPL-MCNC: 13.9 MG/DL (ref 8–23)
CALCIUM SERPL-MCNC: 9.1 MG/DL (ref 8.2–9.6)
CHLORIDE SERPL-SCNC: 103 MMOL/L (ref 98–107)
CREAT SERPL-MCNC: 0.87 MG/DL (ref 0.51–0.95)
DEPRECATED HCO3 PLAS-SCNC: 26 MMOL/L (ref 22–29)
EGFRCR SERPLBLD CKD-EPI 2021: 63 ML/MIN/1.73M2
EOSINOPHIL # BLD AUTO: 0.1 10E3/UL (ref 0–0.7)
EOSINOPHIL NFR BLD AUTO: 1 %
ERYTHROCYTE [DISTWIDTH] IN BLOOD BY AUTOMATED COUNT: 13.8 % (ref 10–15)
GLUCOSE SERPL-MCNC: 104 MG/DL (ref 70–99)
HCT VFR BLD AUTO: 38.3 % (ref 35–47)
HGB BLD-MCNC: 12.3 G/DL (ref 11.7–15.7)
IMM GRANULOCYTES # BLD: 0 10E3/UL
IMM GRANULOCYTES NFR BLD: 0 %
LYMPHOCYTES # BLD AUTO: 1.7 10E3/UL (ref 0.8–5.3)
LYMPHOCYTES NFR BLD AUTO: 21 %
MCH RBC QN AUTO: 31.3 PG (ref 26.5–33)
MCHC RBC AUTO-ENTMCNC: 32.1 G/DL (ref 31.5–36.5)
MCV RBC AUTO: 98 FL (ref 78–100)
MONOCYTES # BLD AUTO: 0.7 10E3/UL (ref 0–1.3)
MONOCYTES NFR BLD AUTO: 9 %
NEUTROPHILS # BLD AUTO: 5.4 10E3/UL (ref 1.6–8.3)
NEUTROPHILS NFR BLD AUTO: 68 %
NRBC # BLD AUTO: 0 10E3/UL
NRBC BLD AUTO-RTO: 0 /100
PLATELET # BLD AUTO: 191 10E3/UL (ref 150–450)
POTASSIUM SERPL-SCNC: 4.1 MMOL/L (ref 3.4–5.3)
PROT SERPL-MCNC: 7 G/DL (ref 6.4–8.3)
RBC # BLD AUTO: 3.93 10E6/UL (ref 3.8–5.2)
SODIUM SERPL-SCNC: 142 MMOL/L (ref 135–145)
WBC # BLD AUTO: 8 10E3/UL (ref 4–11)

## 2023-11-14 PROCEDURE — 36415 COLL VENOUS BLD VENIPUNCTURE: CPT | Performed by: EMERGENCY MEDICINE

## 2023-11-14 PROCEDURE — 82010 KETONE BODYS QUAN: CPT | Performed by: EMERGENCY MEDICINE

## 2023-11-14 PROCEDURE — 84155 ASSAY OF PROTEIN SERUM: CPT | Performed by: EMERGENCY MEDICINE

## 2023-11-14 PROCEDURE — 85025 COMPLETE CBC W/AUTO DIFF WBC: CPT | Performed by: EMERGENCY MEDICINE

## 2023-11-14 ASSESSMENT — ACTIVITIES OF DAILY LIVING (ADL): ADLS_ACUITY_SCORE: 37

## 2023-11-14 NOTE — DISCHARGE INSTRUCTIONS
TODAY'S VISIT:  You were seen today for vomiting   -   - If you had any labs or imaging/radiology tests performed today, you should also discuss these tests with your usual provider.     FOLLOW-UP:  Please make an appointment to follow up with:  - Your Primary Care Provider. If you do not have a PCP, please call the Primary Care Center (phone: [phone] for an appointment  - GI Clinic (phone: [phone])     - Have your provider review the results from today's visit with you again to make sure no further follow-up or additional testing is needed based on those results.     PRESCRIPTIONS / MEDICATIONS:  - make sure to take your omeprazole daily  - do NOT use NSAIDs (aspirin, ibuprofen, naproxen, etc)    RETURN TO THE EMERGENCY DEPARTMENT  Return to the Emergency Department at any time for any new or worsening symptoms or any concerns.

## 2023-11-14 NOTE — ED PROVIDER NOTES
History     Chief Complaint   Patient presents with    Hemoptysis     HPI  Dimple Oviedo is a 89 year old female with past medical history notable for urothelial carcinoma, hyperlipidemia, GERD, melanoma, HTN, CHF, hyperthyroidism, atrial fibrillation (xarelto) and elevated glucose presenting for evaluation of emesis that was black in color. She vomited about 3 times. Unsure of volume. No changes with bowel movements. No abdominal pain. No hematuria. Has had a nephrectomy and ureter removed (about 2 years ago for cancer). No longer nauseated. Her appetite has been fine bu she didn't eat much today due to her symptoms. No easy bruising or gum bleeding.  She does take omeprazole. She also takes iron.      Per chart review, patient's provider noted on 11/8/23 that the patient reported an episode of dark emesis (on 11/3). Patient is on Xarelto.     Does not use NSAIDs.     I have reviewed the Medications, Allergies, Past Medical and Surgical History, and Social History in the [email or web address] system.    Past Medical History:   Diagnosis Date    CRESENCIO (acute kidney injury) (H24) 9/11/2018    Arthritis     Asthma 2/9/2022    Calculus of kidney     Chemotherapy-induced neutropenia  3/6/2019    Congestive heart failure (H)     Esophageal reflux     GERD (gastroesophageal reflux disease)     Hyperlipidemia LDL goal <130 5/9/2010    Malignant melanoma of skin of trunk, except scrotum (H)     Nonspecific abnormal finding     has living will 2004 -     Nontoxic multinodular goiter     no further eval /tx rec per pt    Osteopenia     Other psoriasis     Personal history of colonic polyps     PMR (polymyalgia rheumatica) (H24)     Restless legs syndrome 7/11/2018    Stress-induced cardiomyopathy     Undiagnosed cardiac murmurs     Unspecified constipation     Unspecified essential hypertension     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     Past Surgical History:   Procedure Laterality Date    ARTHROPLASTY KNEE Right 11/9/2020    Procedure:  ARTHROPLASTY, KNEE, TOTAL, RIGHT;  Surgeon: Edward Otero MD;  [facility]    BIOPSY      COLONOSCOPY  2014    COMBINED CYSTOSCOPY, INSERT STENT URETER(S) Bilateral 9/12/2018    Procedure: COMBINED CYSTOSCOPY, INSERT STENT URETER(S);  Cystoscopy Bilateral ureteral Stent Placement.;  Surgeon: Justin Henry MD;  [facility]    COMBINED CYSTOSCOPY, RETROGRADES, URETEROSCOPY, INSERT STENT Bilateral 4/3/2018    Procedure: COMBINED CYSTOSCOPY, RETROGRADES, URETEROSCOPY, INSERT STENT;;  Surgeon: Stuart King MD;  [facility]    COMBINED CYSTOSCOPY, RETROGRADES, URETEROSCOPY, INSERT STENT Bilateral 9/10/2018    Procedure: COMBINED CYSTOSCOPY, RETROGRADES, URETEROSCOPY, INSERT STENT;  Cystoscopy, Bilateral Ureteroscopy, Bladder Biopsies, Retrogram Pyelograms, Ureteral Washings and brushings, cysview;  Surgeon: Stuart King MD;  [facility]    COMBINED CYSTOSCOPY, RETROGRADES, URETEROSCOPY, INSERT STENT Left 8/26/2020    Procedure: Cystoscopy, Left Ureteral Wash, Left ureteral Retrograde Pyelogram;  Surgeon: Justin Henry MD;  [facility]    CYSTOSCOPY, BIOPSY BLADDER INSTILL OPTICAL AGENT N/A 4/3/2018    Procedure: CYSTOSCOPY, BIOPSY BLADDER INSTILL OPTICAL AGENT;  Cystoscopy, Blue Light Cystoscopy, Bladder Biopsies, Bilateral Selective ureteral washings for Cytology, Bilateral Retrograde Pyelograms, Bilateral Ureteroscopy;  Surgeon: Stuart King MD;  [facility]    CYSTOSCOPY, BIOPSY BLADDER, COMBINED N/A 2/19/2018    Procedure: COMBINED CYSTOSCOPY, BIOPSY BLADDER;  Cystoscopy, Bladder Biopsy;  Surgeon: Kenna La MD;  [facility]    CYSTOSCOPY, BIOPSY BLADDER, COMBINED N/A 8/26/2020    Procedure: Cystoscopy, biopsy bladder, combined;  Surgeon: Justin Henry MD;  [facility]    CYSTOSCOPY, FULGURATE BLADDER TUMOR, COMBINED N/A 1/13/2020    Procedure: CYSTOSCOPY, WITH  bladder biopsies and fulguration;  Surgeon:  Stuart King MD;  [facility]    CYSTOSCOPY, REMOVE STENT(S), COMBINED  11/13/2018    Procedure: Flexible Cystoscopy with Stent Removal;  Surgeon: Stuart King MD;  [facility]    DAVINCI LYMPHADENECTOMY N/A 11/13/2018    Procedure: Davinci Lymphadenectomy ;  Surgeon: Stuart King MD;  [facility]    DAVINCI NEPHROURETERECTOMY N/A 11/13/2018    Procedure: Right DaVinci Assisted Nephroureterectomy;  Surgeon: Stuart King MD;  [facility]    ENDOSCOPIC ULTRASOUND LOWER GASTROINTESTIONAL TRACT (GI) N/A 10/30/2015    Procedure: ENDOSCOPIC ULTRASOUND LOWER GASTROINTESTIONAL TRACT (GI);  Surgeon: Daniel Jean-Baptiste MD;  [facility]    EYE SURGERY  12/4/17    INSERT PORT VASCULAR ACCESS Right 12/19/2018    Procedure: Chest Port Placement - right;  Surgeon: Stuart Chavez PA-C;  [facility]    IR CHEST PORT PLACEMENT > 5 YRS OF AGE  12/19/2018    IR PORT REMOVAL RIGHT  6/26/2019    LAPAROSCOPIC CHOLECYSTECTOMY WITH CHOLANGIOGRAMS N/A 11/1/2015    Procedure: LAPAROSCOPIC CHOLECYSTECTOMY WITH CHOLANGIOGRAMS;  Surgeon: Tonie Warren MD;  [facility]    REMOVE PORT VASCULAR ACCESS Right 6/26/2019    Procedure: Right Port Removal;  Surgeon: Froilan Irizarry PA-C;  [facility]    SURGICAL HISTORY OF -   1996    malignant melanoma    SURGICAL HISTORY OF -   1968    thyroid nodule    SURGICAL HISTORY OF -       D & C    ZZC NONSPECIFIC PROCEDURE  2005    colonoscopy polyp repeat 2010     Current Facility-Administered Medications   Medication    lidocaine (XYLOCAINE) 2 % external gel     Current Outpatient Medications   Medication    alendronate (FOSAMAX) 70 MG tablet    diltiazem ER (DILT-XR) 180 MG 24 hr capsule    ferrous sulfate 325 (65 Fe) MG TBEC EC tablet    furosemide (LASIX) 20 MG tablet    irbesartan (AVAPRO) 300 MG tablet    levofloxacin (LEVAQUIN) 500 MG tablet    [START ON 1/1/2024] lovastatin (MEVACOR) 40 MG  tablet    methimazole (TAPAZOLE) 5 MG tablet    Omega-3 Fatty Acids (FISH OIL) 500 MG CAPS    omeprazole (PRILOSEC) 20 MG DR capsule    propranolol ER (INDERAL LA) 60 MG 24 hr capsule    RESTASIS 0.05 % ophthalmic emulsion    rivaroxaban ANTICOAGULANT (XARELTO) 10 MG TABS tablet    rOPINIRole (REQUIP) 0.25 MG tablet    sertraline (ZOLOFT) 100 MG tablet    traZODone (DESYREL) 50 MG tablet     No Known Allergies  Past medical history, past surgical history, medications, and allergies were reviewed with the patient. Additional pertinent items: None    Social History     Socioeconomic History    Marital status:      Spouse name:     Number of children: 2    Years of education: Not on file    Highest education level: Not on file   Occupational History     Employer: RETIRED   Tobacco Use    Smoking status: Never     Passive exposure: Never    Smokeless tobacco: Never   Vaping Use    Vaping Use: Never used   Substance and Sexual Activity    Alcohol use: No     Alcohol/week: 0.0 standard drinks of alcohol     Comment: rare    Drug use: No    Sexual activity: Yes     Partners: Male   Other Topics Concern     Service No    Blood Transfusions No    Caffeine Concern Not Asked    Occupational Exposure Not Asked    Hobby Hazards Not Asked    Sleep Concern Not Asked    Stress Concern Not Asked    Weight Concern Not Asked    Special Diet Not Asked    Back Care Not Asked    Exercise Yes     Comment: curves    Bike Helmet Not Asked    Seat Belt Yes    Self-Exams Yes    Parent/sibling w/ CABG, MI or angioplasty before 65F 55M? No   Social History Narrative    December 7, 2009    Balanced Diet - Yes    Osteoporosis Prevention Measures - Dairy servings per day: 2 and Medication/Supplements (See current meds)    Regular Exercise -  Yes Describe curves, walking    Dental Exam - YES - Date: 10/2009    Eye Exam - YES - Date: 2008    Self Breast Exam - Yes    Abuse: Current or Past (Physical, Sexual or Emotional)- No     Do you feel safe in your environment - Yes    Guns stored in the home - No    Sunscreen used - Yes    Seatbelts used - Yes    Lipids -  YES - Date: 11/24/2009    Glucose -  YES - Date: 11/24/2009    Colon Cancer Screening - Colonoscopy 11/18/2005(date completed)    Hemoccults - YES - Date: 10/12/2004    Pap Test -  YES - Date: 09/22/2004    Do you have any concerns about STD's -  No    Mammography - YES - Date: 11/24/2009    DEXA - YES - Date: 11/20/2008    Immunizations reviewed and up to date - Yes    PAULETTE Spencer CMA             Social Determinants of Health     Financial Resource Strain: Low Risk  (11/8/2023)    Financial Resource Strain     Within the past 12 months, have you or your family members you live with been unable to get utilities (heat, electricity) when it was really needed?: No   Food Insecurity: Low Risk  (11/8/2023)    Food Insecurity     Within the past 12 months, did you worry that your food would run out before you got money to buy more?: No     Within the past 12 months, did the food you bought just not last and you didn t have money to get more?: No   Transportation Needs: Low Risk  (11/8/2023)    Transportation Needs     Within the past 12 months, has lack of transportation kept you from medical appointments, getting your medicines, non-medical meetings or appointments, work, or from getting things that you need?: No   Physical Activity: Not on file   Stress: Not on file   Social Connections: Not on file   Interpersonal Safety: Not on file   Housing Stability: Low Risk  (11/8/2023)    Housing Stability     Do you have housing? : Yes     Are you worried about losing your housing?: No     Social history was reviewed with the patient. Additional pertinent items: None    Review of Systems  A medically appropriate review of systems was performed with pertinent positives and negatives noted in the HPI, and all other systems negative.    Physical Exam   BP: (!) 170/77  Pulse: 87  Temp: 98  F (36.7   C)  Resp: 18  SpO2: 94 %      General: Well nourished, well developed, NAD  HEENT: EOMI, anicteric. NCAT, MMM  Neck: no jugular venous distension, supple, nl ROM  Cardiac: Regular rate and rhythm. No murmurs, rubs, or gallops. Normal S1, S2.  Intact peripheral pulses  Pulm: CTAB, no stridor, wheezes, rales, rhonchi  Abd: Soft, nontender, nondistended.  No masses palpated.    Skin: Warm and dry to the touch.  No rash  Extremities: No LE edema, no cyanosis, w/w/p  Neuro: A&Ox3, no gross focal deficits    ED Course        Procedures                   Labs Ordered and Resulted from Time of ED Arrival to Time of ED Departure   COMPREHENSIVE METABOLIC PANEL - Abnormal       Result Value    Sodium 146 (*)     Potassium 5.3      Carbon Dioxide (CO2) 22      Anion Gap 29 (*)     Urea Nitrogen 15.0      Creatinine 0.84      GFR Estimate 66      Calcium 9.1      Chloride 95 (*)     Glucose 93      Alkaline Phosphatase        AST        ALT        Protein Total 7.2      Albumin 4.0      Bilirubin Total 0.5     INR - Abnormal    INR 2.57 (*)    PARTIAL THROMBOPLASTIN TIME - Abnormal    aPTT 67 (*)    ROUTINE UA WITH MICROSCOPIC REFLEX TO CULTURE - Abnormal    Color Urine Straw      Appearance Urine Clear      Glucose Urine Negative      Bilirubin Urine Negative      Ketones Urine Negative      Specific Gravity Urine 1.005      Blood Urine Negative      pH Urine 7.0      Protein Albumin Urine Negative      Urobilinogen Urine Normal      Nitrite Urine Negative      Leukocyte Esterase Urine Negative      Bacteria Urine Few (*)     RBC Urine <1      WBC Urine 1     COMPREHENSIVE METABOLIC PANEL - Abnormal    Sodium 142      Potassium 4.1      Carbon Dioxide (CO2) 26      Anion Gap 13      Urea Nitrogen 13.9      Creatinine 0.87      GFR Estimate 63      Calcium 9.1      Chloride 103      Glucose 104 (*)     Alkaline Phosphatase 86      AST        ALT 21      Protein Total 7.0      Albumin 4.1      Bilirubin Total 0.5     LIPASE -  Normal    Lipase 31     LACTIC ACID WHOLE BLOOD - Normal    Lactic Acid 1.5     KETONE BETA-HYDROXYBUTYRATE QUANTITATIVE, RAPID - Normal    Ketone (Beta-Hydroxybutyrate) Quantitative <0.18     CBC WITH PLATELETS AND DIFFERENTIAL    WBC Count 7.5      RBC Count 4.18      Hemoglobin 13.0      Hematocrit 41.0      MCV 98      MCH 31.1      MCHC 31.7      RDW 13.8      Platelet Count 221      % Neutrophils 70      % Lymphocytes 17      % Monocytes 9      % Eosinophils 2      % Basophils 1      % Immature Granulocytes 1      NRBCs per 100 WBC 0      Absolute Neutrophils 5.2      Absolute Lymphocytes 1.3      Absolute Monocytes 0.7      Absolute Eosinophils 0.1      Absolute Basophils 0.1      Absolute Immature Granulocytes 0.0      Absolute NRBCs 0.0     CBC WITH PLATELETS AND DIFFERENTIAL    WBC Count 8.0      RBC Count 3.93      Hemoglobin 12.3      Hematocrit 38.3      MCV 98      MCH 31.3      MCHC 32.1      RDW 13.8      Platelet Count 191      % Neutrophils 68      % Lymphocytes 21      % Monocytes 9      % Eosinophils 1      % Basophils 1      % Immature Granulocytes 0      NRBCs per 100 WBC 0      Absolute Neutrophils 5.4      Absolute Lymphocytes 1.7      Absolute Monocytes 0.7      Absolute Eosinophils 0.1      Absolute Basophils 0.1      Absolute Immature Granulocytes 0.0      Absolute NRBCs 0.0     TYPE AND SCREEN, ADULT    ABO/RH(D) O POS      Antibody Screen Negative      SPECIMEN EXPIRATION DATE [id number]     ABO/RH TYPE AND SCREEN            Results for orders placed or performed during the hospital encounter of 11/13/23 (from the past 24 hour(s))   CBC with platelets differential    Narrative    The following orders were created for panel order CBC with platelets differential.  Procedure                               Abnormality         Status                     ---------                               -----------         ------                     CBC with platelets and d...[[id number]]                       Final result                 Please view results for these tests on the individual orders.   Comprehensive metabolic panel   Result Value Ref Range    Sodium 146 (H) 135 - 145 mmol/L    Potassium 5.3 3.4 - 5.3 mmol/L    Carbon Dioxide (CO2) 22 22 - 29 mmol/L    Anion Gap 29 (H) 7 - 15 mmol/L    Urea Nitrogen 15.0 8.0 - 23.0 mg/dL    Creatinine 0.84 0.51 - 0.95 mg/dL    GFR Estimate 66 >60 mL/min/1.73m2    Calcium 9.1 8.2 - 9.6 mg/dL    Chloride 95 (L) 98 - 107 mmol/L    Glucose 93 70 - 99 mg/dL    Alkaline Phosphatase      AST      ALT      Protein Total 7.2 6.4 - 8.3 g/dL    Albumin 4.0 3.5 - 5.2 g/dL    Bilirubin Total 0.5 <=1.2 mg/dL   Lipase   Result Value Ref Range    Lipase 31 13 - 60 U/L   ABO/Rh type and screen    Narrative    The following orders were created for panel order ABO/Rh type and screen.  Procedure                               Abnormality         Status                     ---------                               -----------         ------                     Adult Type and Screen[[id number]]                            Final result                 Please view results for these tests on the individual orders.   INR   Result Value Ref Range    INR 2.57 (H) 0.85 - 1.15   Partial thromboplastin time   Result Value Ref Range    aPTT 67 (H) 22 - 38 Seconds   CBC with platelets and differential   Result Value Ref Range    WBC Count 7.5 4.0 - 11.0 10e3/uL    RBC Count 4.18 3.80 - 5.20 10e6/uL    Hemoglobin 13.0 11.7 - 15.7 g/dL    Hematocrit 41.0 35.0 - 47.0 %    MCV 98 78 - 100 fL    MCH 31.1 26.5 - 33.0 pg    MCHC 31.7 31.5 - 36.5 g/dL    RDW 13.8 10.0 - 15.0 %    Platelet Count 221 150 - 450 10e3/uL    % Neutrophils 70 %    % Lymphocytes 17 %    % Monocytes 9 %    % Eosinophils 2 %    % Basophils 1 %    % Immature Granulocytes 1 %    NRBCs per 100 WBC 0 <1 /100    Absolute Neutrophils 5.2 1.6 - 8.3 10e3/uL    Absolute Lymphocytes 1.3 0.8 - 5.3 10e3/uL    Absolute Monocytes 0.7 0.0 - 1.3 10e3/uL     Absolute Eosinophils 0.1 0.0 - 0.7 10e3/uL    Absolute Basophils 0.1 0.0 - 0.2 10e3/uL    Absolute Immature Granulocytes 0.0 <=0.4 10e3/uL    Absolute NRBCs 0.0 10e3/uL   Adult Type and Screen   Result Value Ref Range    ABO/RH(D) O POS     Antibody Screen Negative Negative    SPECIMEN EXPIRATION DATE [id number]    UA with Microscopic reflex to Culture    Specimen: Urine, Clean Catch   Result Value Ref Range    Color Urine Straw Colorless, Straw, Light Yellow, Yellow    Appearance Urine Clear Clear    Glucose Urine Negative Negative mg/dL    Bilirubin Urine Negative Negative    Ketones Urine Negative Negative mg/dL    Specific Gravity Urine 1.005 1.003 - 1.035    Blood Urine Negative Negative    pH Urine 7.0 5.0 - 7.0    Protein Albumin Urine Negative Negative mg/dL    Urobilinogen Urine Normal Normal, 2.0 mg/dL    Nitrite Urine Negative Negative    Leukocyte Esterase Urine Negative Negative    Bacteria Urine Few (A) None Seen /HPF    RBC Urine <1 <=2 /HPF    WBC Urine 1 <=5 /HPF    Narrative    Urine Culture not indicated   Lactic acid whole blood   Result Value Ref Range    Lactic Acid 1.5 0.7 - 2.0 mmol/L   CBC with platelets differential    Narrative    The following orders were created for panel order CBC with platelets differential.  Procedure                               Abnormality         Status                     ---------                               -----------         ------                     CBC with platelets and d...[[id number]]                      Final result                 Please view results for these tests on the individual orders.   Ketone Beta-Hydroxybutyrate Quantitative   Result Value Ref Range    Ketone (Beta-Hydroxybutyrate) Quantitative <0.18 <=0.30 mmol/L   Comprehensive metabolic panel   Result Value Ref Range    Sodium 142 135 - 145 mmol/L    Potassium 4.1 3.4 - 5.3 mmol/L    Carbon Dioxide (CO2) 26 22 - 29 mmol/L    Anion Gap 13 7 - 15 mmol/L    Urea Nitrogen 13.9 8.0 -  23.0 mg/dL    Creatinine 0.87 0.51 - 0.95 mg/dL    GFR Estimate 63 >60 mL/min/1.73m2    Calcium 9.1 8.2 - 9.6 mg/dL    Chloride 103 98 - 107 mmol/L    Glucose 104 (H) 70 - 99 mg/dL    Alkaline Phosphatase 86 35 - 104 U/L    AST      ALT 21 0 - 50 U/L    Protein Total 7.0 6.4 - 8.3 g/dL    Albumin 4.1 3.5 - 5.2 g/dL    Bilirubin Total 0.5 <=1.2 mg/dL   CBC with platelets and differential   Result Value Ref Range    WBC Count 8.0 4.0 - 11.0 10e3/uL    RBC Count 3.93 3.80 - 5.20 10e6/uL    Hemoglobin 12.3 11.7 - 15.7 g/dL    Hematocrit 38.3 35.0 - 47.0 %    MCV 98 78 - 100 fL    MCH 31.3 26.5 - 33.0 pg    MCHC 32.1 31.5 - 36.5 g/dL    RDW 13.8 10.0 - 15.0 %    Platelet Count 191 150 - 450 10e3/uL    % Neutrophils 68 %    % Lymphocytes 21 %    % Monocytes 9 %    % Eosinophils 1 %    % Basophils 1 %    % Immature Granulocytes 0 %    NRBCs per 100 WBC 0 <1 /100    Absolute Neutrophils 5.4 1.6 - 8.3 10e3/uL    Absolute Lymphocytes 1.7 0.8 - 5.3 10e3/uL    Absolute Monocytes 0.7 0.0 - 1.3 10e3/uL    Absolute Eosinophils 0.1 0.0 - 0.7 10e3/uL    Absolute Basophils 0.1 0.0 - 0.2 10e3/uL    Absolute Immature Granulocytes 0.0 <=0.4 10e3/uL    Absolute NRBCs 0.0 10e3/uL     *Note: Due to a large number of results and/or encounters for the requested time period, some results have not been displayed. A complete set of results can be found in Results Review.       Labs, vital signs, and imaging studies were reviewed by me.    Medications   sodium chloride 0.9% BOLUS 500 mL (0 mLs Intravenous Stopped 11/13/23 2918)       Assessments & Plan (with Medical Decision Making)   Dimple Oviedo is a 89 year old female who presents to the emergency department with dark emesis.  Concern for GI bleeding particularly as patient is on blood thinners.  Likely due to upper GI source such as peptic ulcer disease or gastritis.  Differential diagnosis would also include medication side effects (patient is on iron) [place].  Patient does not take  NSAIDs.  Causes of nausea may also include viral syndrome, pancreatitis, hepatitis.  Labs ordered to further evaluate patient in the emergency department.    Laboratory work-up is remarkable for hemoglobin stable at 13.0 (this was 12.3 when it was last checked 5 days ago).  Will obtain 4-hour recheck to ensure stability.  CMP shows potassium of 5.3, significantly elevated anion gap, however, specimen was hemolyzed.  IV fluids were ordered.  We will plan to recheck metabolic panel and obtain lactate and beta hydroxybutyrate to evaluate for anion gap metabolic acidosis.  Lipase within normal limits at 31.  INR is elevated at 2.57, PTT is 67.    Urinalysis without hematuria, does not appear consistent with UTI    4-hour recheck hemoglobin is stable at 12.3    CMP shows potassium within normal limits at 4.1, sodium 141, chloride 103 on recheck. Ketones wnl. Anion gap wnl.     Critical care was not performed.     Medical Decision Making  The patient's presentation was of high complexity (an acute health issue posing potential threat to life or bodily function).    The patient's evaluation involved:  an assessment requiring an independent historian (pt's son)  review of 3+ test result(s) ordered prior to this encounter (previous labs for comparison)  ordering and/or review of 3+ test(s) in this encounter (see separate area of note for details)    The patient's management necessitated high risk (a decision regarding hospitalization).    I have reviewed the nursing notes.    I have reviewed the findings, diagnosis, plan and need for follow up with the patient.    Considered hospitalization for further monitoring given GI bleeding in elderly patient on blood thinners.  However, the patient would strongly prefer to be discharged home and hemoglobin is stable compared to baseline as well as on 4-hour recheck.  Patient to be discharged home and advised to follow-up with her PCP.  Gastroenterology referral for upper endoscopy  was also placed.  Patient to return to ER immediately with any new/worsening symptoms. Plan of care discussed with patient who expresses understanding and agrees with plan of care.    New Prescriptions    No medications on file       Final diagnoses:   Dark emesis       ROBBY BRAGA MD  11/13/2023   [facility] EMERGENCY DEPARTMENT       Robby Braga MD  11/14/23 0157

## 2023-11-14 NOTE — ED TRIAGE NOTES
"Presents after 1 episode of throwing up \"black stuff\" this morning.   Happened on 11/3 also but pt never presenting at that time.     On Xarelto     Triage Assessment (Adult)       Row Name 11/13/23 0182          Triage Assessment    Airway WDL WDL        Respiratory WDL    Respiratory WDL WDL        Skin Circulation/Temperature WDL    Skin Circulation/Temperature WDL WDL        Cardiac WDL    Cardiac WDL WDL        Peripheral/Neurovascular WDL    Peripheral Neurovascular WDL WDL        Cognitive/Neuro/Behavioral WDL    Cognitive/Neuro/Behavioral WDL WDL                     "

## 2023-11-15 ENCOUNTER — HOSPITAL ENCOUNTER (OUTPATIENT)
Facility: AMBULATORY SURGERY CENTER | Age: 88
End: 2023-11-15
Attending: INTERNAL MEDICINE | Admitting: INTERNAL MEDICINE
Payer: MEDICARE

## 2023-11-15 ENCOUNTER — TELEPHONE (OUTPATIENT)
Dept: GASTROENTEROLOGY | Facility: CLINIC | Age: 88
End: 2023-11-15
Payer: MEDICARE

## 2023-11-15 NOTE — TELEPHONE ENCOUNTER
"Endoscopy Scheduling Screen    Have you had a positive Covid test in the last 14 days?  No      Are you active on MyChart?   Yes      What insurance is in the chart?  Other:  medicare & aarp      Ordering/Referring Provider: ROBBY BOWMAN   (If ordering provider performs procedure, schedule with ordering provider unless otherwise instructed. )    BMI: Estimated body mass index is 38.3 kg/m  as calculated from the following:    Height as of 11/8/23: 1.448 m (4' 9\").    Weight as of 11/8/23: 80.3 kg (177 lb).     Sedation   Ordered  moderate sedation.   If patient BMI > 50 do not schedule in ASC.    If patient BMI > 45 do not schedule at [facility].    Are you taking methadone or Suboxone?  No      Are you taking any prescription medications for pain 3 or more times per week?   No      Do you have a history of malignant hyperthermia or adverse reaction to anesthesia?  No      (Females) Are you currently pregnant?          Have you been diagnosed or told you have pulmonary hypertension?   No      Do you have an LVAD?  No      Have you been told you have moderate to severe sleep apnea?  No      Have you been told you have COPD, asthma, or any other lung disease?  No      Do you have any heart conditions?  Yes  AFIB    In the past 6 months, have you had any hospitalizations for heart related issues including cardiomyopathy, heart attack, or stent placement?  No    Do you have any implantable devices in your body (pacemaker, ICD)?  No    Do you take nitroglycerine?  No      Have you ever had an organ transplant?   No      Have you ever had or are you awaiting a heart or lung transplant?   No    Have you had a stroke or transient ischemic attack (TIA aka \"mini stroke\" in the last 6 months?   No      Have you been diagnosed with or been told you have cirrhosis of the liver?   No      Are you currently on dialysis?   No      Do you need assistance transferring?   No    BMI: Estimated body mass index is 38.3 kg/m  as calculated " "from the following:    Height as of 11/8/23: 1.448 m (4' 9\").    Weight as of 11/8/23: 80.3 kg (177 lb).       Is patients BMI > 40 and scheduling location [facility]?  No      Do you take an injectable medication for weight loss or diabetes (excluding insulin)?  No      Do you take the medication Naltrexone?  No      Do you take blood thinners?  Yes  XARELTO    Are you taking Effient/Prasugrel?  No, you must contact your prescribing provider for direction on holding or bridging with a different medication.       Prep   Are you currently on dialysis or do you have chronic kidney disease?  No 1 KIDNEY REMOVED      Do you have a diagnosis of diabetes?  No      Do you have a diagnosis of cystic fibrosis (CF)?  No      On a regular basis do you go 3 -5 days between bowel movements?        BMI > 40?        Preferred Pharmacy:      Final Scheduling Details   Colonoscopy prep sent?  N/A      Procedure scheduled  Upper endoscopy (EGD)      Surgeon:  ZARA     Date of procedure:  11/29/23     Pre-OP / PAC:   No - Not required for this site.    Location  [facility] - Patient preference.    Sedation   Moderate Sedation - Per order.      Patient Reminders:   You will receive a call from a Nurse to review instructions and health history.  This assessment must be completed prior to your procedure.  Failure to complete the Nurse assessment may result in the procedure being cancelled.      On the day of your procedure, please designate an adult(s) who can drive you home stay with you for the next 24 hours. The medicines used in the exam will make you sleepy. You will not be able to drive.      You cannot take public transportation, ride share services, or non-medical taxi service without a responsible caregiver.  Medical transport services are allowed with the requirement that a responsible caregiver will receive you at your destination.  We require that drivers and caregivers are confirmed prior to your procedure.    "

## 2023-11-15 NOTE — TELEPHONE ENCOUNTER
Patient is scheduled for a Upper endoscopy (EGD)  Date of procedure: 11/29/23  Indication: Hematemesis with nausea  Sedation type: Conscious sedation   [place] [facility]    Patient with recent labs. Patient is on Rivaroxaban (Xarelto).          Date                     Value               Ref Range           Status                11/13/2023               2.57 (H)            0.85 - 1.15         Final                Writer sent staff message to [facility] anesthesia to clarificy ok to proceed with INR over 2 or would this need to be completed at a hosptial setting?          Charu Swanson RN  Endoscopy Procedure Pre Assessment RN  [phone] option 4

## 2023-11-17 ENCOUNTER — TELEPHONE (OUTPATIENT)
Dept: GASTROENTEROLOGY | Facility: CLINIC | Age: 88
End: 2023-11-17
Payer: MEDICARE

## 2023-11-17 NOTE — TELEPHONE ENCOUNTER
Attempted to contact patient in order to complete pre assessment questions.     No answer. Left message to return call to [phone] option 4    **NOTE - patient was rescheduled to a hospital setting. Ensure patient is aware of facility change.       Procedure details:    Patient scheduled for Upper endoscopy (EGD) on 11/29/23.     Arrival time: 0945. Procedure time 1045    Pre op exam needed? N/A    Facility location: [facility]; [id number] [place] [address] [address], [place] [address]    Sedation type: Conscious sedation     Indication for procedure: Hematemesis with nausea       Chart review:     Electronic implanted devices? No    Recent diagnosis of diverticulitis within the last 6 weeks? No    Diabetic? No    Medication review:    Anticoagulants? Yes Rivaroxaban (Xarelto): Recommended HOLD 2 days before procedure.  Consult with your managing provider.    NSAIDS? No    Other medication HOLDING recommendations:  N/A      Prep for procedure:     Bowel prep recommendation: N/A    Prep instructions sent via [employer].     Charu Swanson RN  Endoscopy Procedure Pre Assessment RN

## 2023-11-17 NOTE — TELEPHONE ENCOUNTER
"Response from [facility] anesthesia:    \"Schedule at hospital due to overall clinical condition, age, and history\"    Sent to endoscopy scheduling to reschedule to hospital setting.     Charu Swanson RN  Endoscopy Procedure Pre Assessment RN  [phone] option 4     "

## 2023-11-17 NOTE — TELEPHONE ENCOUNTER
Caller: Writer to patient  Reason for Reschedule/Cancellation (please be detailed, any staff messages or encounters to note?): Needs hospital setting per RN/anesthesia review      Prior to reschedule please review:  Ordering Provider: ROBBY BOWMAN   Sedation per order: Moderate  Does patient have any ASC Exclusions, please identify?: Y - INR level      Notes on Cancelled Procedure:  Procedure: Upper Endoscopy [EGD]   Date: 11/29/2023  Location: [facility]; [address], [address], [place] [address]  Surgeon: Yuli      Rescheduled: Yes  Procedure: Upper Endoscopy [EGD]   Date: 11/29/2023  Location: [facility]; [address], [address], [place] [address]  Surgeon: VICTORIA Peñaloza  Sedation Level Scheduled  Moderate,  Reason for Sedation Level Order  Prep/Instructions updated and sent: Paulina

## 2023-11-21 NOTE — TELEPHONE ENCOUNTER
Second call attempt to complete pre assessment.     No answer.  Left message to return call to [phone] #4 by next business day prior to 4PM or procedure will be sent to cancel.     Additional information needed?  N/A      Violet Mcdonough RN  Endoscopy Procedure Pre Assessment RN

## 2023-11-24 NOTE — TELEPHONE ENCOUNTER
Pre assessment completed for upcoming procedure.      Procedure details:    Patient scheduled for Upper endoscopy (EGD) on 11.29.23.     Arrival time: 0945. Procedure time 1045    Pre op exam needed? N/A    Facility location: [facility]; [address], [address], [place] [address]    Sedation type: Conscious sedation     Indication for procedure: hematemesis    COVID policy reviewed.    Designated  policy reviewed. Instructed to have someone stay 6 hours post procedure.       Chart review:     Electronic implanted devices? No    Recent diagnosis of diverticulitis within the last 6 weeks?  N/A    Diabetic? No      Medication review:    Anticoagulants? Yes Rivaroxaban (Xarelto): Recommended HOLD 2 days before procedure.  Consult with your managing provider.    NSAIDS? No    Other medication HOLDING recommendations:  N/A      Prep for procedure:     Prep instructions sent via [email or web address]    Reviewed procedure prep instructions.     Patient verbalized understanding and had no questions or concerns at this time.        Violet Mcdonough RN  Endoscopy Procedure Pre Assessment RN  [phone] option 4

## 2023-11-29 ENCOUNTER — HOSPITAL ENCOUNTER (OUTPATIENT)
Facility: CLINIC | Age: 88
Discharge: HOME | End: 2023-11-29
Attending: INTERNAL MEDICINE | Admitting: INTERNAL MEDICINE
Payer: MEDICARE

## 2023-11-29 VITALS
RESPIRATION RATE: 24 BRPM | DIASTOLIC BLOOD PRESSURE: 57 MMHG | OXYGEN SATURATION: 97 % | SYSTOLIC BLOOD PRESSURE: 102 MMHG | HEART RATE: 81 BPM

## 2023-11-29 LAB — UPPER GI ENDOSCOPY: NORMAL

## 2023-11-29 PROCEDURE — 43235 EGD DIAGNOSTIC BRUSH WASH: CPT | Performed by: INTERNAL MEDICINE

## 2023-11-29 PROCEDURE — 999N000099 HC STATISTIC MODERATE SEDATION < 10 MIN: Performed by: INTERNAL MEDICINE

## 2023-11-29 PROCEDURE — 250N000011 HC RX IP 250 OP 636: Performed by: INTERNAL MEDICINE

## 2023-11-29 RX ORDER — ONDANSETRON 2 MG/ML
4 INJECTION INTRAMUSCULAR; INTRAVENOUS EVERY 6 HOURS PRN
Status: DISCONTINUED | OUTPATIENT
Start: 2023-11-29 | End: 2023-11-29 | Stop reason: HOSPADM

## 2023-11-29 RX ORDER — ONDANSETRON 4 MG/1
4 TABLET, ORALLY DISINTEGRATING ORAL EVERY 6 HOURS PRN
Status: DISCONTINUED | OUTPATIENT
Start: 2023-11-29 | End: 2023-11-29 | Stop reason: HOSPADM

## 2023-11-29 RX ORDER — FENTANYL CITRATE 50 UG/ML
INJECTION, SOLUTION INTRAMUSCULAR; INTRAVENOUS PRN
Status: DISCONTINUED | OUTPATIENT
Start: 2023-11-29 | End: 2023-11-29 | Stop reason: HOSPADM

## 2023-11-29 RX ORDER — LIDOCAINE 40 MG/G
CREAM TOPICAL
Status: DISCONTINUED | OUTPATIENT
Start: 2023-11-29 | End: 2023-11-29 | Stop reason: HOSPADM

## 2023-11-29 RX ORDER — NALOXONE HYDROCHLORIDE 0.4 MG/ML
0.4 INJECTION, SOLUTION INTRAMUSCULAR; INTRAVENOUS; SUBCUTANEOUS
Status: DISCONTINUED | OUTPATIENT
Start: 2023-11-29 | End: 2023-11-29 | Stop reason: HOSPADM

## 2023-11-29 RX ORDER — NALOXONE HYDROCHLORIDE 0.4 MG/ML
0.2 INJECTION, SOLUTION INTRAMUSCULAR; INTRAVENOUS; SUBCUTANEOUS
Status: DISCONTINUED | OUTPATIENT
Start: 2023-11-29 | End: 2023-11-29 | Stop reason: HOSPADM

## 2023-11-29 RX ORDER — PROCHLORPERAZINE MALEATE 5 MG
5 TABLET ORAL EVERY 6 HOURS PRN
Status: DISCONTINUED | OUTPATIENT
Start: 2023-11-29 | End: 2023-11-29 | Stop reason: HOSPADM

## 2023-11-29 RX ORDER — ONDANSETRON 2 MG/ML
4 INJECTION INTRAMUSCULAR; INTRAVENOUS
Status: DISCONTINUED | OUTPATIENT
Start: 2023-11-29 | End: 2023-11-29 | Stop reason: HOSPADM

## 2023-11-29 RX ORDER — FLUMAZENIL 0.1 MG/ML
0.2 INJECTION, SOLUTION INTRAVENOUS
Status: DISCONTINUED | OUTPATIENT
Start: 2023-11-29 | End: 2023-11-29 | Stop reason: HOSPADM

## 2023-11-29 ASSESSMENT — ACTIVITIES OF DAILY LIVING (ADL): ADLS_ACUITY_SCORE: 37

## 2023-11-29 NOTE — H&P
Dimple Oviedo  [id number]  female  89 year old      Reason for procedure/surgery: Hematemesis    Patient Active Problem List   Diagnosis    Esophageal reflux    Restless leg syndrome    heat intolerance    Goiter    Disorder of bone and cartilage    Other psoriasis    perirectal cyst    Malignant melanoma of skin of trunk, except scrotum (H)    Nontoxic multinodular goiter    Hyperlipidemia LDL goal <130    Hypertension goal BP (blood pressure) < 140/90    Urinary incontinence    Hip arthritis    Polymyalgia rheumatica (H24)    High risk medication use    Shoulder pain    Impaired fasting blood sugar    Chronic bilateral low back pain without sciatica    Obesity, unspecified obesity severity, unspecified obesity type    Iron deficiency anemia, unspecified iron deficiency anemia type    NSTEMI (non-ST elevated myocardial infarction) (H)    Stress-induced cardiomyopathy    A-fib (H)    Anxiety    Chronic systolic heart failure (H)    Urothelial carcinoma (H)    Hyperthyroidism    Restless legs syndrome    Hydronephrosis    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)    Graves disease    Encounter for antineoplastic chemotherapy    Primary localized osteoarthritis of right knee    Urothelial cancer (H)    Malignant neoplasm of overlapping sites of bladder (H)    Primary osteoarthritis of right knee    Chronic kidney disease, stage 3 (H)    Lipedematous scalp    Atrial fibrillation with rapid ventricular response (H)    Morbid obesity (H)    Aneurysm of renal artery (H24)    Osteopenia, unspecified location    Asthma    THERON (generalized anxiety disorder)       Past Surgical History:    Past Surgical History:   Procedure Laterality Date    ARTHROPLASTY KNEE Right 11/9/2020    Procedure: ARTHROPLASTY, KNEE, TOTAL, RIGHT;  Surgeon: Edward Otero MD;  [facility]    BIOPSY      COLONOSCOPY  2014    COMBINED CYSTOSCOPY, INSERT STENT URETER(S) Bilateral 9/12/2018    Procedure: COMBINED CYSTOSCOPY, INSERT  STENT URETER(S);  Cystoscopy Bilateral ureteral Stent Placement.;  Surgeon: Justin Henry MD;  [facility]    COMBINED CYSTOSCOPY, RETROGRADES, URETEROSCOPY, INSERT STENT Bilateral 4/3/2018    Procedure: COMBINED CYSTOSCOPY, RETROGRADES, URETEROSCOPY, INSERT STENT;;  Surgeon: Stuart King MD;  [facility]    COMBINED CYSTOSCOPY, RETROGRADES, URETEROSCOPY, INSERT STENT Bilateral 9/10/2018    Procedure: COMBINED CYSTOSCOPY, RETROGRADES, URETEROSCOPY, INSERT STENT;  Cystoscopy, Bilateral Ureteroscopy, Bladder Biopsies, Retrogram Pyelograms, Ureteral Washings and brushings, cysview;  Surgeon: Stuart King MD;  [facility]    COMBINED CYSTOSCOPY, RETROGRADES, URETEROSCOPY, INSERT STENT Left 8/26/2020    Procedure: Cystoscopy, Left Ureteral Wash, Left ureteral Retrograde Pyelogram;  Surgeon: Justin Henry MD;  [facility]    CYSTOSCOPY, BIOPSY BLADDER INSTILL OPTICAL AGENT N/A 4/3/2018    Procedure: CYSTOSCOPY, BIOPSY BLADDER INSTILL OPTICAL AGENT;  Cystoscopy, Blue Light Cystoscopy, Bladder Biopsies, Bilateral Selective ureteral washings for Cytology, Bilateral Retrograde Pyelograms, Bilateral Ureteroscopy;  Surgeon: Stuart King MD;  [facility]    CYSTOSCOPY, BIOPSY BLADDER, COMBINED N/A 2/19/2018    Procedure: COMBINED CYSTOSCOPY, BIOPSY BLADDER;  Cystoscopy, Bladder Biopsy;  Surgeon: Kenna La MD;  [facility]    CYSTOSCOPY, BIOPSY BLADDER, COMBINED N/A 8/26/2020    Procedure: Cystoscopy, biopsy bladder, combined;  Surgeon: Justin Henry MD;  [facility]    CYSTOSCOPY, FULGURATE BLADDER TUMOR, COMBINED N/A 1/13/2020    Procedure: CYSTOSCOPY, WITH  bladder biopsies and fulguration;  Surgeon: Stuart King MD;  [facility]    CYSTOSCOPY, REMOVE STENT(S), COMBINED  11/13/2018    Procedure: Flexible Cystoscopy with Stent Removal;  Surgeon: Stuart King MD;  [facility]    [facility]  LYMPHADENECTOMY N/A 11/13/2018    Procedure: Davinci Lymphadenectomy ;  Surgeon: Stuart King MD;  [facility]    DAVINCI NEPHROURETERECTOMY N/A 11/13/2018    Procedure: Right DaVinci Assisted Nephroureterectomy;  Surgeon: Stuart King MD;  [facility]    ENDOSCOPIC ULTRASOUND LOWER GASTROINTESTIONAL TRACT (GI) N/A 10/30/2015    Procedure: ENDOSCOPIC ULTRASOUND LOWER GASTROINTESTIONAL TRACT (GI);  Surgeon: Daniel Jean-Baptiste MD;  [facility]    EYE SURGERY  12/4/17    INSERT PORT VASCULAR ACCESS Right 12/19/2018    Procedure: Chest Port Placement - right;  Surgeon: Stuart Chavez PA-C;  [facility]    IR CHEST PORT PLACEMENT > 5 YRS OF AGE  12/19/2018    IR PORT REMOVAL RIGHT  6/26/2019    LAPAROSCOPIC CHOLECYSTECTOMY WITH CHOLANGIOGRAMS N/A 11/1/2015    Procedure: LAPAROSCOPIC CHOLECYSTECTOMY WITH CHOLANGIOGRAMS;  Surgeon: Tonie Warren MD;  [facility]    REMOVE PORT VASCULAR ACCESS Right 6/26/2019    Procedure: Right Port Removal;  Surgeon: Froilan Irizarry PA-C;  [facility]    SURGICAL HISTORY OF -   1996    malignant melanoma    SURGICAL HISTORY OF -   1968    thyroid nodule    SURGICAL HISTORY OF -       D & C    ZZC NONSPECIFIC PROCEDURE  2005    colonoscopy polyp repeat 2010       Past Medical History:   Past Medical History:   Diagnosis Date    CRESENCIO (acute kidney injury) (H24) 9/11/2018    Arthritis     Asthma 2/9/2022    Calculus of kidney     Chemotherapy-induced neutropenia  3/6/2019    Congestive heart failure (H)     Esophageal reflux     GERD (gastroesophageal reflux disease)     Hyperlipidemia LDL goal <130 5/9/2010    Malignant melanoma of skin of trunk, except scrotum (H)     Nonspecific abnormal finding     has living will 2004 -     Nontoxic multinodular goiter     no further eval /tx rec per pt    Osteopenia     Other psoriasis     Personal history of colonic polyps     PMR (polymyalgia rheumatica) (H24)     Restless  legs syndrome 7/11/2018    Stress-induced cardiomyopathy     Undiagnosed cardiac murmurs     Unspecified constipation     Unspecified essential hypertension     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       Social History:   Social History     Tobacco Use    Smoking status: Never     Passive exposure: Never    Smokeless tobacco: Never   Substance Use Topics    Alcohol use: No     Alcohol/week: 0.0 standard drinks of alcohol     Comment: rare       Family History:   Family History   Problem Relation Age of Onset    Cancer Father         dec - esophageal and laryngeal    Heart Disease Mother     Respiratory Mother         dec    Breast Cancer Daughter     Other Cancer Daughter     Thyroid Disease Daughter     Asthma Daughter     Hyperlipidemia Son     Diabetes Son     Anesthesia Reaction No family hx of     Deep Vein Thrombosis (DVT) No family hx of        Allergies: No Known Allergies    Active Medications:   No current outpatient medications on file.       Systemic Review:   CONSTITUTIONAL: NEGATIVE for fever, chills, change in weight  ENT/MOUTH: NEGATIVE for ear, mouth and throat problems  RESP: NEGATIVE for significant cough or SOB  CV: NEGATIVE for chest pain, palpitations or peripheral edema    Physical Examination:   Vital Signs: There were no vitals taken for this visit.  GENERAL: healthy, alert and no distress  NECK: no adenopathy, no asymmetry, masses, or scars  RESP: lungs clear to auscultation - no rales, rhonchi or wheezes  CV: regular rate and rhythm, normal S1 S2, no S3 or S4, no murmur, click or rub, no peripheral edema and peripheral pulses strong  ABDOMEN: soft, nontender, no hepatosplenomegaly, no masses and bowel sounds normal  MS: no gross musculoskeletal defects noted, no edema    ASA: 2    Mallampati Score: 2    Plan: Appropriate to proceed as scheduled.      Justin Peñaloza MD  11/29/2023    PCP:  Pop Zapien

## 2023-11-29 NOTE — OR NURSING
EGD with no interventions performed under moderate sedation, patient tolerated well. Transferred to  and report given to recovery RN.

## 2023-12-01 ENCOUNTER — TELEPHONE (OUTPATIENT)
Dept: FAMILY MEDICINE | Facility: CLINIC | Age: 88
End: 2023-12-01
Payer: MEDICARE

## 2023-12-01 NOTE — TELEPHONE ENCOUNTER
Patient states she got a [employer] message indicating that her lovastatin has been discontinued. She is unsure why.    Office visit of 11/8/23 indicates that she may discontinue if she wants as it may not be helpful in light of her other co-morbitdities.  Dr. Zapien did write refil effective 1/24, so the discontinue date of current rx filled in as 11/8/23.    She will continue taking it unless she decides otherwise. She will further discuss with Dr. Zapien at that time.    Alissa De La Cruz, RN, BSN, PHN  [facility]  [phone]

## 2023-12-07 ENCOUNTER — LAB (OUTPATIENT)
Dept: LAB | Facility: CLINIC | Age: 88
End: 2023-12-07
Payer: MEDICARE

## 2023-12-07 ENCOUNTER — OFFICE VISIT (OUTPATIENT)
Dept: CARDIOLOGY | Facility: CLINIC | Age: 88
End: 2023-12-07
Payer: MEDICARE

## 2023-12-07 ENCOUNTER — ANCILLARY PROCEDURE (OUTPATIENT)
Dept: CARDIOLOGY | Facility: CLINIC | Age: 88
End: 2023-12-07
Attending: INTERNAL MEDICINE
Payer: MEDICARE

## 2023-12-07 VITALS
HEART RATE: 80 BPM | DIASTOLIC BLOOD PRESSURE: 100 MMHG | BODY MASS INDEX: 38.11 KG/M2 | WEIGHT: 176.1 LBS | SYSTOLIC BLOOD PRESSURE: 163 MMHG | OXYGEN SATURATION: 94 %

## 2023-12-07 DIAGNOSIS — K21.9 GASTROESOPHAGEAL REFLUX DISEASE, UNSPECIFIED WHETHER ESOPHAGITIS PRESENT: ICD-10-CM

## 2023-12-07 DIAGNOSIS — R11.10 VOMITING, UNSPECIFIED VOMITING TYPE, UNSPECIFIED WHETHER NAUSEA PRESENT: ICD-10-CM

## 2023-12-07 DIAGNOSIS — I48.20 CHRONIC ATRIAL FIBRILLATION (H): ICD-10-CM

## 2023-12-07 DIAGNOSIS — I50.22 CHRONIC SYSTOLIC HEART FAILURE (H): Primary | ICD-10-CM

## 2023-12-07 LAB
ANION GAP SERPL CALCULATED.3IONS-SCNC: 9 MMOL/L (ref 7–15)
BUN SERPL-MCNC: 16.8 MG/DL (ref 8–23)
CALCIUM SERPL-MCNC: 9.4 MG/DL (ref 8.2–9.6)
CHLORIDE SERPL-SCNC: 100 MMOL/L (ref 98–107)
CREAT SERPL-MCNC: 0.93 MG/DL (ref 0.51–0.95)
DEPRECATED HCO3 PLAS-SCNC: 29 MMOL/L (ref 22–29)
EGFRCR SERPLBLD CKD-EPI 2021: 58 ML/MIN/1.73M2
ERYTHROCYTE [DISTWIDTH] IN BLOOD BY AUTOMATED COUNT: 13.4 % (ref 10–15)
GLUCOSE SERPL-MCNC: 103 MG/DL (ref 70–99)
HCT VFR BLD AUTO: 39.8 % (ref 35–47)
HGB BLD-MCNC: 12.8 G/DL (ref 11.7–15.7)
LVEF ECHO: NORMAL
MCH RBC QN AUTO: 30.9 PG (ref 26.5–33)
MCHC RBC AUTO-ENTMCNC: 32.2 G/DL (ref 31.5–36.5)
MCV RBC AUTO: 96 FL (ref 78–100)
PLATELET # BLD AUTO: 220 10E3/UL (ref 150–450)
POTASSIUM SERPL-SCNC: 4.7 MMOL/L (ref 3.4–5.3)
RBC # BLD AUTO: 4.14 10E6/UL (ref 3.8–5.2)
SODIUM SERPL-SCNC: 138 MMOL/L (ref 135–145)
WBC # BLD AUTO: 8.4 10E3/UL (ref 4–11)

## 2023-12-07 PROCEDURE — 93306 TTE W/DOPPLER COMPLETE: CPT | Performed by: INTERNAL MEDICINE

## 2023-12-07 PROCEDURE — 85027 COMPLETE CBC AUTOMATED: CPT | Performed by: PATHOLOGY

## 2023-12-07 PROCEDURE — 99215 OFFICE O/P EST HI 40 MIN: CPT | Mod: 25

## 2023-12-07 PROCEDURE — 36415 COLL VENOUS BLD VENIPUNCTURE: CPT | Performed by: PATHOLOGY

## 2023-12-07 PROCEDURE — 80048 BASIC METABOLIC PNL TOTAL CA: CPT | Performed by: PATHOLOGY

## 2023-12-07 PROCEDURE — 93005 ELECTROCARDIOGRAM TRACING: CPT

## 2023-12-07 PROCEDURE — 93010 ELECTROCARDIOGRAM REPORT: CPT | Performed by: INTERNAL MEDICINE

## 2023-12-07 PROCEDURE — G0463 HOSPITAL OUTPT CLINIC VISIT: HCPCS

## 2023-12-07 ASSESSMENT — PAIN SCALES - GENERAL: PAINLEVEL: NO PAIN (0)

## 2023-12-07 NOTE — PROGRESS NOTES
ELECTROPHYSIOLOGY CLINIC VISIT    Assessment/Recommendations   Assessment/Plan:    Dimple Oviedo is a 89 year old female with past medical history significant for urothelial carcinoma, hyperlipidemia, GERD, melanoma, HTN, CHF, lymphedema, hyperthyroidism and atrial fibrillation.     Permanent Atrial Fibrillation   We discussed in detail with the patient management/treatment options for Jan includin. Stroke Prophylaxis: CHADSVASC= 4 ++age, +female, +htn 4, corresponding to a 4.0% annual stroke / systemic emolism event rate. indicating need for long term oral anticoagulation. Continue Xarelto 10 mg daily for now as explained below. In the future, if kidney function remains stable, she would likely qualify for xarelto 20 mg daily.   2. Rate Control: Rates appears to be in mid 80s during visit. Continue Propanolol 60 mg daily.   3. Risk Factor Management: Statin, BP control, and JESS evaluation.  Lovastatin 40 mg daily. BP today elevated, 163/100. Patient without any symptoms, manual retake with improvement to 145/80. Review of recent office visits 130-140/75.  Echo done today with normal LV function, LVEF 55-60%. Normal RV function. PASP 48, severe left atrial enlargement and dilation of IVC. Weight appears stable. Will increase lasix dose to 40 mg in am, 20 mg in pm x7days. Recheck BMP in 1 week.     Dark Emesis  She reports dark emesis about once a month for the past 3 months. She was evaluated in the ER for this on 23, hemoglobin 13, 4 hour recheck stable. Workup otherwise unremarkable. Encouraged follow up with PCP and outpatient gastroenterology referral for upper endoscopy. Endoscopy done , she had been given the results yet. Last episode of emesis yesterday morning. She takes oral iron and prilosec. She has continued on xarelto. Repeat CBC and BMP today stable. Suspect oral iron may be the cause - will place referral to GI for further follow up and evaluation as well as close follow up  with PCP. Will continue Xarelto for now due to stable blood counts the past several months and endoscopy without significant findings. Will have her hold oral iron for now until she sees GI or primary.    Follow up in 6 months.     History of Present Illness/Subjective    Ms. Dimple Oviedo is a 89 year old female who comes in today for EP follow-up of A fib.    Patient is a 89 yo female with past medical history significant for urothelial carcinoma, hyperlipidemia, GERD, melanoma, HTN, CHF, lymphedema, hyperthyroidism and atrial fibrillation. She presented to the ER in December 2017 with chest pain and SOB, found to have elevated troponin. Echocardiogram reveals reduced EF, 30%, no ischemic changes on ECG, thought to likely be d/t stress cardiomyopathy. Repeat echo done 1/17/18 with improvement in LVEF to > 65%. She has continued to follow with Dr Griffith annually. She has remained in A fin since September 2021. At visit with Dr Griffith in 2021 diltiazem increased due to A fib rates ~100 bpm. At follow up last year, 2022, ventricular rates improved to ~85bpm.     She reports feeling well. Her main concern today is dark emesis that has been occurring about once a month for the past 3 months. She was evaluated in the ER for this on 11/13/23, hemoglobin 13, 4 hour recheck stable. Workup otherwise unremarkable. Endoscopy done 11/29, she had been given the results yet. Echo done today with normal LV function, LVEF 55-60%. Normal RV function. PASP 48, severe left atrial enlargement and dilation of IVC. Last episode of emesis yesterday morning. She denies chest discomfort, worsening shortness of breath. She feels peripheral edema is at her baseline with her lymphedema. Her daughter voices concern about in home services to assist patient with thing such as putting on compression socks. Presenting 12 lead ECG shows A fib Vent Rate 86 bpm, QRS 96 ms, QTc 437 ms.     I have reviewed and updated the patient's Past Medical  History, Social History, Family History and Medication List.     Cardiographics (Personally Reviewed) :   Echo: 12/07/2023   Interpretation Summary  Global and regional left ventricular function is normal with an EF of 55-60%.  Global right ventricular function is normal.  Mild aortic insufficiency.  Moderate tricuspid insufficiency.  Elevated pulmonary artery systolic pressure, 48 mmHg.  Dilation of the inferior vena cava is present with normal respiratory  variation in diameter.  This study was compared with the study from 9/2/21. No change in ventricular  function; AI and TR are worse and estimated PA pressure is higher.    Echo: 9/02/2021   Interpretation Summary  The patient's rhythm is atrial fibrillation.  Global and regional left ventricular function is normal with an EF of 55-60%.  RV is not well seen, fxn is probably normal to mildly reduced.  No significant valvular abnormalities present.  IVC diameter and respiratory changes fall into an intermediate range  suggesting an RA pressure of 8 mmHg.  No pericardial effusion is present.  There has been no change.    Coronary Angiogram/[facility]: 12/13/17         Physical Examination   BP (!) 163/100 (BP Location: Right arm, Patient Position: Chair, Cuff Size: Adult Regular)   Pulse 80   Wt 79.9 kg (176 lb 1.6 oz)   SpO2 94%   BMI 38.11 kg/m    Wt Readings from Last 3 Encounters:   12/07/23 79.9 kg (176 lb 1.6 oz)   11/08/23 80.3 kg (177 lb)   09/19/23 79.7 kg (175 lb 11.2 oz)     General Appearance:   Alert, well-appearing and in no acute distress.   HEENT: Atraumatic, normocephalic. MMM.   Chest/Lungs:   Respirations unlabored.  Lungs are clear to auscultation.   Cardiovascular:   Irregular rhythm.  S1/S2. No murmur.    Abdomen:  Soft, nontender, nondistended.   Extremities: No cyanosis or clubbing. Wearing compression socks, diffuse pretibial and pedal edema, non-pitting.    Musculoskeletal: Moves all extremities.     Skin: Warm, dry, intact.    Neurologic:  Mood and affect are appropriate.  Alert and oriented to person, place, time, and situation.          Medications  Allergies   Diltiazem  mg daily   Irbesartan 300 mg daily   Lovastatin 40 mg daily   Fish oil   Propanolol 60 mg daily   Xarelto 10 mg daily   Lasix 20 mg daily     Methimazole   Trazodone   Zoloft   Prilosec   Fosamax    No Known Allergies      Lab Results (Personally Reviewed)    Chemistry/lipid CBC Cardiac Enzymes/BNP/TSH/INR   Lab Results   Component Value Date    BUN 13.9 11/14/2023     11/14/2023    CO2 26 11/14/2023     Creatinine   Date Value Ref Range Status   11/14/2023 0.87 0.51 - 0.95 mg/dL Final   05/11/2021 1.04 0.52 - 1.04 mg/dL Final       Lab Results   Component Value Date    CHOL 150 11/08/2023    HDL 46 (L) 11/08/2023    LDL 76 11/08/2023      Lab Results   Component Value Date    WBC 8.0 11/14/2023    HGB 12.3 11/14/2023    HCT 38.3 11/14/2023    MCV 98 11/14/2023     11/14/2023    Lab Results   Component Value Date    TSH 2.39 06/22/2023    INR 2.57 (H) 11/13/2023        The patient states understanding and is agreeable with the plan.     Chandrika Camp PA-C  [facility]  Electrophysiology Consult Service  Pager: [phone]    I spent a total of 30 minutes face to face with Dimple Oviedo during today's office visit. I have spent an additional 35 minutes today on chart review and documentation.

## 2023-12-07 NOTE — LETTER
2023      RE: Dimple Oviedo  [address]  [facility] [address]       Dear Colleague,    Thank you for the opportunity to participate in the care of your patient, Dimple Oviedo, at the [facility] [place] [facility]. Please see a copy of my visit note below.        ELECTROPHYSIOLOGY CLINIC VISIT    Assessment/Recommendations   Assessment/Plan:    Dimple Oviedo is a 89 year old female with past medical history significant for urothelial carcinoma, hyperlipidemia, GERD, melanoma, HTN, CHF, lymphedema, hyperthyroidism and atrial fibrillation.     Permanent Atrial Fibrillation   We discussed in detail with the patient management/treatment options for Jan includin. Stroke Prophylaxis: CHADSVASC= 4 ++age, +female, +htn 4, corresponding to a 4.0% annual stroke / systemic emolism event rate. indicating need for long term oral anticoagulation. Continue Xarelto 10 mg daily for now as explained below. In the future, if kidney function remains stable, she would likely qualify for xarelto 20 mg daily.   2. Rate Control: Rates appears to be in mid 80s during visit. Continue Propanolol 60 mg daily.   3. Risk Factor Management: Statin, BP control, and JESS evaluation.  Lovastatin 40 mg daily. BP today elevated, 163/100. Patient without any symptoms, manual retake with improvement to 145/80. Review of recent office visits 130-140/75.  Echo done today with normal LV function, LVEF 55-60%. Normal RV function. PASP 48, severe left atrial enlargement and dilation of IVC. Weight appears stable. Will increase lasix dose to 40 mg in am, 20 mg in pm x7days. Recheck BMP in 1 week.     Dark Emesis  She reports dark emesis about once a month for the past 3 months. She was evaluated in the ER for this on 23, hemoglobin 13, 4 hour recheck stable. Workup otherwise unremarkable. Encouraged follow up with PCP and outpatient gastroenterology  referral for upper endoscopy. Endoscopy done 11/29, she had been given the results yet. Last episode of emesis yesterday morning. She takes oral iron and prilosec. She has continued on xarelto. Repeat CBC and BMP today stable. Suspect oral iron may be the cause - will place referral to GI for further follow up and evaluation as well as close follow up with PCP. Will continue Xarelto for now due to stable blood counts the past several months and endoscopy without significant findings. Will have her hold oral iron for now until she sees GI or primary.    Follow up in 6 months.     History of Present Illness/Subjective    Ms. Dimple Oviedo is a 89 year old female who comes in today for EP follow-up of A fib.    Patient is a 89 yo female with past medical history significant for urothelial carcinoma, hyperlipidemia, GERD, melanoma, HTN, CHF, lymphedema, hyperthyroidism and atrial fibrillation. She presented to the ER in December 2017 with chest pain and SOB, found to have elevated troponin. Echocardiogram reveals reduced EF, 30%, no ischemic changes on ECG, thought to likely be d/t stress cardiomyopathy. Repeat echo done 1/17/18 with improvement in LVEF to > 65%. She has continued to follow with Dr Griffith annually. She has remained in A fin since September 2021. At visit with Dr Griffith in 2021 diltiazem increased due to A fib rates ~100 bpm. At follow up last year, 2022, ventricular rates improved to ~85bpm.     She reports feeling well. Her main concern today is dark emesis that has been occurring about once a month for the past 3 months. She was evaluated in the ER for this on 11/13/23, hemoglobin 13, 4 hour recheck stable. Workup otherwise unremarkable. Endoscopy done 11/29, she had been given the results yet. Echo done today with normal LV function, LVEF 55-60%. Normal RV function. PASP 48, severe left atrial enlargement and dilation of IVC. Last episode of emesis yesterday morning. She denies chest discomfort,  worsening shortness of breath. She feels peripheral edema is at her baseline with her lymphedema. Her daughter voices concern about in home services to assist patient with thing such as putting on compression socks. Presenting 12 lead ECG shows A fib Vent Rate 86 bpm, QRS 96 ms, QTc 437 ms.     I have reviewed and updated the patient's Past Medical History, Social History, Family History and Medication List.     Cardiographics (Personally Reviewed) :   Echo: 12/07/2023   Interpretation Summary  Global and regional left ventricular function is normal with an EF of 55-60%.  Global right ventricular function is normal.  Mild aortic insufficiency.  Moderate tricuspid insufficiency.  Elevated pulmonary artery systolic pressure, 48 mmHg.  Dilation of the inferior vena cava is present with normal respiratory  variation in diameter.  This study was compared with the study from 9/2/21. No change in ventricular  function; AI and TR are worse and estimated PA pressure is higher.    Echo: 9/02/2021   Interpretation Summary  The patient's rhythm is atrial fibrillation.  Global and regional left ventricular function is normal with an EF of 55-60%.  RV is not well seen, fxn is probably normal to mildly reduced.  No significant valvular abnormalities present.  IVC diameter and respiratory changes fall into an intermediate range  suggesting an RA pressure of 8 mmHg.  No pericardial effusion is present.  There has been no change.    [facility] Ang[facility]: 12/13/17         Physical Examination   BP (!) 163/100 (BP Location: Right arm, Patient Position: Chair, Cuff Size: Adult Regular)   Pulse 80   Wt 79.9 kg (176 lb 1.6 oz)   SpO2 94%   BMI 38.11 kg/m    Wt Readings from Last 3 Encounters:   12/07/23 79.9 kg (176 lb 1.6 oz)   11/08/23 80.3 kg (177 lb)   09/19/23 79.7 kg (175 lb 11.2 oz)     General Appearance:   Alert, well-appearing and in no acute distress.   HEENT: Atraumatic, normocephalic. MMM.   Chest/Lungs:   Respirations  unlabored.  Lungs are clear to auscultation.   Cardiovascular:   Irregular rhythm.  S1/S2. No murmur.    Abdomen:  Soft, nontender, nondistended.   Extremities: No cyanosis or clubbing. Wearing compression socks, diffuse pretibial and pedal edema, non-pitting.    Musculoskeletal: Moves all extremities.     Skin: Warm, dry, intact.    Neurologic: Mood and affect are appropriate.  Alert and oriented to person, place, time, and situation.          Medications  Allergies   Diltiazem  mg daily   Irbesartan 300 mg daily   Lovastatin 40 mg daily   Fish oil   Propanolol 60 mg daily   Xarelto 10 mg daily   Lasix 20 mg daily     Methimazole   Trazodone   Zoloft   Prilosec   Fosamax    No Known Allergies      Lab Results (Personally Reviewed)    Chemistry/lipid CBC Cardiac Enzymes/BNP/TSH/INR   Lab Results   Component Value Date    BUN 13.9 11/14/2023     11/14/2023    CO2 26 11/14/2023     Creatinine   Date Value Ref Range Status   11/14/2023 0.87 0.51 - 0.95 mg/dL Final   05/11/2021 1.04 0.52 - 1.04 mg/dL Final       Lab Results   Component Value Date    CHOL 150 11/08/2023    HDL 46 (L) 11/08/2023    LDL 76 11/08/2023      Lab Results   Component Value Date    WBC 8.0 11/14/2023    HGB 12.3 11/14/2023    HCT 38.3 11/14/2023    MCV 98 11/14/2023     11/14/2023    Lab Results   Component Value Date    TSH 2.39 06/22/2023    INR 2.57 (H) 11/13/2023        The patient states understanding and is agreeable with the plan.     Chandrika Camp PA-C  [facility]  Electrophysiology Consult Service  Pager: [phone]    I spent a total of 30 minutes face to face with Dimple Oviedo during today's office visit. I have spent an additional 35 minutes today on chart review and documentation.

## 2023-12-07 NOTE — PATIENT INSTRUCTIONS
You were seen in the Electrophysiology Clinic today by: Chandrika SHAH    Plan:   Medication Changes:   Increase lasix to 40 mg in am, 20 mg in pm for one week     Labs/Tests Needed:  CBC and BMP today, will be in touch with results     Follow up Visit:  Follow up with GI to discuss endoscopy results and further workup   Follow up with EP in 6 months     If you have further questions, please utilize [employer] to contact us.     Your Care Team:    EP Cardiology   Telephone Number     Nurse Line  ATUL Ortega RN Dani Proehl, RN   [phone]     For scheduling appointments:   All   [phone]   For procedure scheduling:    Lenora Ramos     [phone]   For the Device Clinic (Pacemakers, ICDs, Loop Recorders)    During business hours: [phone]  After business hours:   [phone]- select option 4 and ask for job code [id number].       On-call cardiologist for after hours or on weekends:   [phone], option #4, and ask to speak to the on-call cardiologist.     Cardiovascular Clinic:   [address] [place] [address]. [place] [address]      As always, Thank you for trusting us with your health care needs!

## 2023-12-08 LAB
ATRIAL RATE - MUSE: 76 BPM
DIASTOLIC BLOOD PRESSURE - MUSE: NORMAL MMHG
INTERPRETATION ECG - MUSE: NORMAL
P AXIS - MUSE: NORMAL DEGREES
PR INTERVAL - MUSE: NORMAL MS
QRS DURATION - MUSE: 96 MS
QT - MUSE: 366 MS
QTC - MUSE: 437 MS
R AXIS - MUSE: -49 DEGREES
SYSTOLIC BLOOD PRESSURE - MUSE: NORMAL MMHG
T AXIS - MUSE: 79 DEGREES
VENTRICULAR RATE- MUSE: 86 BPM

## 2023-12-18 ENCOUNTER — OFFICE VISIT (OUTPATIENT)
Dept: FAMILY MEDICINE | Facility: CLINIC | Age: 88
End: 2023-12-18
Payer: MEDICARE

## 2023-12-18 VITALS
RESPIRATION RATE: 16 BRPM | HEIGHT: 58 IN | HEART RATE: 88 BPM | DIASTOLIC BLOOD PRESSURE: 77 MMHG | TEMPERATURE: 97.5 F | WEIGHT: 176 LBS | BODY MASS INDEX: 36.94 KG/M2 | OXYGEN SATURATION: 97 % | SYSTOLIC BLOOD PRESSURE: 120 MMHG

## 2023-12-18 DIAGNOSIS — G89.29 CHRONIC BILATERAL LOW BACK PAIN WITHOUT SCIATICA: ICD-10-CM

## 2023-12-18 DIAGNOSIS — N18.30 STAGE 3 CHRONIC KIDNEY DISEASE, UNSPECIFIED WHETHER STAGE 3A OR 3B CKD (H): Primary | ICD-10-CM

## 2023-12-18 DIAGNOSIS — M54.50 CHRONIC BILATERAL LOW BACK PAIN WITHOUT SCIATICA: ICD-10-CM

## 2023-12-18 DIAGNOSIS — K92.0 DARK EMESIS: ICD-10-CM

## 2023-12-18 DIAGNOSIS — I48.20 CHRONIC ATRIAL FIBRILLATION (H): ICD-10-CM

## 2023-12-18 DIAGNOSIS — M85.80 OSTEOPENIA, UNSPECIFIED LOCATION: ICD-10-CM

## 2023-12-18 DIAGNOSIS — I50.22 CHRONIC SYSTOLIC HEART FAILURE (H): ICD-10-CM

## 2023-12-18 PROBLEM — I72.2 ANEURYSM OF RENAL ARTERY (H): Status: RESOLVED | Noted: 2021-11-17 | Resolved: 2023-12-18

## 2023-12-18 LAB
ERYTHROCYTE [DISTWIDTH] IN BLOOD BY AUTOMATED COUNT: 13.3 % (ref 10–15)
HCT VFR BLD AUTO: 39.9 % (ref 35–47)
HGB BLD-MCNC: 12.7 G/DL (ref 11.7–15.7)
MCH RBC QN AUTO: 31.1 PG (ref 26.5–33)
MCHC RBC AUTO-ENTMCNC: 31.8 G/DL (ref 31.5–36.5)
MCV RBC AUTO: 98 FL (ref 78–100)
PLATELET # BLD AUTO: 214 10E3/UL (ref 150–450)
RBC # BLD AUTO: 4.08 10E6/UL (ref 3.8–5.2)
WBC # BLD AUTO: 7.6 10E3/UL (ref 4–11)

## 2023-12-18 PROCEDURE — 82043 UR ALBUMIN QUANTITATIVE: CPT | Performed by: FAMILY MEDICINE

## 2023-12-18 PROCEDURE — 85027 COMPLETE CBC AUTOMATED: CPT | Performed by: FAMILY MEDICINE

## 2023-12-18 PROCEDURE — 99214 OFFICE O/P EST MOD 30 MIN: CPT | Performed by: FAMILY MEDICINE

## 2023-12-18 PROCEDURE — 80048 BASIC METABOLIC PNL TOTAL CA: CPT | Performed by: FAMILY MEDICINE

## 2023-12-18 PROCEDURE — 82570 ASSAY OF URINE CREATININE: CPT | Performed by: FAMILY MEDICINE

## 2023-12-18 PROCEDURE — 36415 COLL VENOUS BLD VENIPUNCTURE: CPT | Performed by: FAMILY MEDICINE

## 2023-12-18 RX ORDER — RESPIRATORY SYNCYTIAL VIRUS VACCINE 120MCG/0.5
0.5 KIT INTRAMUSCULAR ONCE
Qty: 1 EACH | Refills: 0 | Status: CANCELLED | OUTPATIENT
Start: 2023-12-18 | End: 2023-12-18

## 2023-12-18 RX ORDER — ALENDRONATE SODIUM 70 MG/1
TABLET ORAL
Qty: 12 TABLET | Refills: 2 | Status: SHIPPED | OUTPATIENT
Start: 2023-12-18 | End: 2024-07-22

## 2023-12-18 RX ORDER — DILTIAZEM HYDROCHLORIDE 180 MG/1
180 CAPSULE, EXTENDED RELEASE ORAL DAILY
Qty: 90 CAPSULE | Refills: 3 | Status: SHIPPED | OUTPATIENT
Start: 2023-12-18

## 2023-12-18 NOTE — PROGRESS NOTES
Assessment & Plan     Dark emesis  Now resolved.  Upper endoscopy was negative.  Was advised to have follow-up appointment with GI as per daughter.  Will do GI referral.  We discussed it may not be most urgent with the many of her other specialty appointment if she is not having another episode.  At this point we agreed to continue to hold off on her iron and see how she does.  I will repeat repeat hemoglobin in 3 to 6 months.  - Adult GI  Referral - Consult Only; Future    Stage 3 chronic kidney disease, unspecified whether stage 3a or 3b CKD (H)  Continue to keep an eye on renal function.  Needs repeat labs today.  - Albumin Random Urine Quantitative with Creat Ratio; Future  - CBC with platelets; Future  - Basic metabolic panel  (Ca, Cl, CO2, Creat, Gluc, K, Na, BUN); Future  - Albumin Random Urine Quantitative with Creat Ratio  - CBC with platelets    Chronic atrial fibrillation (H)  Reviewed recent cardiology notes.  No change in the dose of her anticoagulation medications.  - diltiazem ER (DILT-XR) 180 MG 24 hr capsule; Take 1 capsule (180 mg) by mouth daily  - Basic metabolic panel  (Ca, Cl, CO2, Creat, Gluc, K, Na, BUN)    Chronic systolic heart failure (H)  Reviewed recent cardiology notes.  I recommended her to go back on her regular Lasix dose.  Recently temporary increase in her Lasix dose.  - Basic metabolic panel  (Ca, Cl, CO2, Creat, Gluc, K, Na, BUN)    Chronic bilateral low back pain without sciatica  Walking with a walker.  Does not drive.  Would benefit from strengthening exercises.  She finds it would be hard for her to go to physical therapy.  Will do home care referral.  - Home Care Referral    Osteopenia, unspecified location  Continue same Rx.  - alendronate (FOSAMAX) 70 MG tablet; TAKE 1 TABLET EVERY 7 DAYS AT LEAST 60 MINUTES BEFORE BREAKFAST AS DIRECTED.                 Pop Zapien MD, MD  [facility]    Davey Musa is a 89 year  "old, presenting for the following health issues:  Follow Up      12/18/2023     2:50 PM   Additional Questions   Accompanied by Son       History of Present Illness       Reason for visit:  Upper GI test results    She eats 4 or more servings of fruits and vegetables daily.She consumes 0 sweetened beverage(s) daily.She exercises with enough effort to increase her heart rate 10 to 19 minutes per day.  She exercises with enough effort to increase her heart rate 7 days per week.   She is taking medications regularly.     Here with her son.   Daughter on the phone.   No bloody or black vomiting.   Had upper GI - it was fine.     Cardiology wanted her to follow up.   Water pill dose was increased.     Quit taking iron pill.     Hoping to improve strength to get up from toilet. Using walker for back pain.     Does not drive and son drives her. Can't walk without walker.     Review of Systems         Objective    BP (!) 146/85 (BP Location: Right arm, Patient Position: Sitting, Cuff Size: Adult Large)   Pulse 88   Temp 97.5  F (36.4  C) (Temporal)   Resp 16   Ht 1.48 m (4' 10.27\")   Wt 79.8 kg (176 lb)   SpO2 97%   BMI 36.45 kg/m    Body mass index is 36.45 kg/m .  Physical Exam   Diffuse non pitting lower  limbs edema  Walking with walker  Lungs clear  Heart RRR                        "

## 2023-12-19 ENCOUNTER — TELEPHONE (OUTPATIENT)
Dept: GASTROENTEROLOGY | Facility: CLINIC | Age: 88
End: 2023-12-19
Payer: MEDICARE

## 2023-12-19 ENCOUNTER — TELEPHONE (OUTPATIENT)
Dept: FAMILY MEDICINE | Facility: CLINIC | Age: 88
End: 2023-12-19
Payer: MEDICARE

## 2023-12-19 ENCOUNTER — DOCUMENTATION ONLY (OUTPATIENT)
Dept: OTHER | Facility: CLINIC | Age: 88
End: 2023-12-19
Payer: MEDICARE

## 2023-12-19 LAB
ANION GAP SERPL CALCULATED.3IONS-SCNC: 11 MMOL/L (ref 7–15)
BUN SERPL-MCNC: 19.7 MG/DL (ref 8–23)
CALCIUM SERPL-MCNC: 9.4 MG/DL (ref 8.2–9.6)
CHLORIDE SERPL-SCNC: 99 MMOL/L (ref 98–107)
CREAT SERPL-MCNC: 1.04 MG/DL (ref 0.51–0.95)
CREAT UR-MCNC: 10.9 MG/DL
DEPRECATED HCO3 PLAS-SCNC: 29 MMOL/L (ref 22–29)
EGFRCR SERPLBLD CKD-EPI 2021: 51 ML/MIN/1.73M2
GLUCOSE SERPL-MCNC: 101 MG/DL (ref 70–99)
MICROALBUMIN UR-MCNC: <12 MG/L
MICROALBUMIN/CREAT UR: NORMAL MG/G{CREAT}
POTASSIUM SERPL-SCNC: 4.6 MMOL/L (ref 3.4–5.3)
SODIUM SERPL-SCNC: 139 MMOL/L (ref 135–145)

## 2023-12-19 NOTE — TELEPHONE ENCOUNTER
[facility] Call Center    Phone Message    May a detailed message be left on voicemail: yes     Reason for Call: Other: NEW GI Emergent referral scheduled first available.     Action Taken: Other: triage gi    Travel Screening: Not Applicable

## 2023-12-19 NOTE — TELEPHONE ENCOUNTER
Home Health Care    Reason for call:  Update    Orders are needed for this patient.  PT: Postpone until January per patient request    Pt Provider: Dr. Zapien    Phone Number Homecare Nurse can be reached at: [phone]

## 2023-12-19 NOTE — TELEPHONE ENCOUNTER
Left detailed message for  home care nurse per Dr. Zapien.    Alissa De La Cruz, RN, BSN, PHN  [facility]  [phone]

## 2023-12-19 NOTE — TELEPHONE ENCOUNTER
Please advise OK to delay PT per patient request.  Thank you.    Alissa De La Cruz, RN, BSN, PHN  [facility]  [phone]

## 2023-12-20 NOTE — TELEPHONE ENCOUNTER
Pt is rescheduled to see Dr. Tamela Freitas on 1/3/2024 at 10 AM for a virtual visit. Writer informed Pt that Pt's appointment on 1/8/2024 at 9 AM with Saira Tompkins will be canceled as well for Pt does not need that appointment. Pt expressed understanding.

## 2024-01-03 ENCOUNTER — VIRTUAL VISIT (OUTPATIENT)
Dept: GASTROENTEROLOGY | Facility: CLINIC | Age: 89
End: 2024-01-03
Attending: FAMILY MEDICINE
Payer: MEDICARE

## 2024-01-03 DIAGNOSIS — R11.10 VOMITING, UNSPECIFIED VOMITING TYPE, UNSPECIFIED WHETHER NAUSEA PRESENT: ICD-10-CM

## 2024-01-03 DIAGNOSIS — K92.0 DARK EMESIS: Primary | ICD-10-CM

## 2024-01-03 DIAGNOSIS — K21.9 GASTROESOPHAGEAL REFLUX DISEASE, UNSPECIFIED WHETHER ESOPHAGITIS PRESENT: ICD-10-CM

## 2024-01-03 DIAGNOSIS — I48.20 CHRONIC ATRIAL FIBRILLATION (H): ICD-10-CM

## 2024-01-03 PROCEDURE — 99204 OFFICE O/P NEW MOD 45 MIN: CPT | Mod: 95 | Performed by: INTERNAL MEDICINE

## 2024-01-03 NOTE — NURSING NOTE
Is the patient currently in the state of [place]? YES    Visit mode:VIDEO    If the visit is dropped, the patient can be reconnected by: VIDEO VISIT: Send to e-mail at: [email or web address]    Will anyone else be joining the visit? NO  (If patient encounters technical issues they should call [phone])    How would you like to obtain your AVS? MyChart    Are changes needed to the allergy or medication list? No    Reason for visit: Consult    Lucius KAMINSKI

## 2024-01-03 NOTE — PROGRESS NOTES
HPI:    Dimple  presents today for a video visit to discuss dark emesis that occurred 4 times earlier this year.  Episodes would happen a few weeks a part. Thinks she was also having dark stools at the time as well.  Doesn't recall abdominal pain with it.  Wasn't sick.  Doesn't use NSAIDs.  Had an EGD with Dr. Peñaloza which showed a large hiatal hernia but was otherwise normal.  Is on omeprazole and believes she has been for sometime.  Reports that controls acid reflux.  Is on iron supplements - wondering if she needs to continue it. Hemoglobin stable with normal MCV      Past Medical History:   Diagnosis Date    CRESENCIO (acute kidney injury) (H24) 9/11/2018    Arthritis     Asthma 2/9/2022    Calculus of kidney     Chemotherapy-induced neutropenia  3/6/2019    Congestive heart failure (H)     Esophageal reflux     GERD (gastroesophageal reflux disease)     Hyperlipidemia LDL goal <130 5/9/2010    Malignant melanoma of skin of trunk, except scrotum (H)     Nonspecific abnormal finding     has living will 2004 -     Nontoxic multinodular goiter     no further eval /tx rec per pt    Osteopenia     Other psoriasis     Personal history of colonic polyps     PMR (polymyalgia rheumatica) (H24)     Restless legs syndrome 7/11/2018    Stress-induced cardiomyopathy     Undiagnosed cardiac murmurs     Unspecified constipation     Unspecified essential hypertension     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       Past Surgical History:   Procedure Laterality Date    ARTHROPLASTY KNEE Right 11/9/2020    Procedure: ARTHROPLASTY, KNEE, TOTAL, RIGHT;  Surgeon: Edward Otero MD;  [facility]    BIOPSY      COLONOSCOPY  2014    COMBINED CYSTOSCOPY, INSERT STENT URETER(S) Bilateral 9/12/2018    Procedure: COMBINED CYSTOSCOPY, INSERT STENT URETER(S);  Cystoscopy Bilateral ureteral Stent Placement.;  Surgeon: Justin Henry MD;  [facility]    COMBINED CYSTOSCOPY, RETROGRADES, URETEROSCOPY, INSERT STENT Bilateral  4/3/2018    Procedure: COMBINED CYSTOSCOPY, RETROGRADES, URETEROSCOPY, INSERT STENT;;  Surgeon: Stuart King MD;  [facility]    COMBINED CYSTOSCOPY, RETROGRADES, URETEROSCOPY, INSERT STENT Bilateral 9/10/2018    Procedure: COMBINED CYSTOSCOPY, RETROGRADES, URETEROSCOPY, INSERT STENT;  Cystoscopy, Bilateral Ureteroscopy, Bladder Biopsies, Retrogram Pyelograms, Ureteral Washings and brushings, cysview;  Surgeon: Stuart King MD;  [facility]    COMBINED CYSTOSCOPY, RETROGRADES, URETEROSCOPY, INSERT STENT Left 8/26/2020    Procedure: Cystoscopy, Left Ureteral Wash, Left ureteral Retrograde Pyelogram;  Surgeon: Justin Henry MD;  [facility]    CYSTOSCOPY, BIOPSY BLADDER INSTILL OPTICAL AGENT N/A 4/3/2018    Procedure: CYSTOSCOPY, BIOPSY BLADDER INSTILL OPTICAL AGENT;  Cystoscopy, Blue Light Cystoscopy, Bladder Biopsies, Bilateral Selective ureteral washings for Cytology, Bilateral Retrograde Pyelograms, Bilateral Ureteroscopy;  Surgeon: Stuart King MD;  [facility]    CYSTOSCOPY, BIOPSY BLADDER, COMBINED N/A 2/19/2018    Procedure: COMBINED CYSTOSCOPY, BIOPSY BLADDER;  Cystoscopy, Bladder Biopsy;  Surgeon: Kenna La MD;  [facility]    CYSTOSCOPY, BIOPSY BLADDER, COMBINED N/A 8/26/2020    Procedure: Cystoscopy, biopsy bladder, combined;  Surgeon: Justin Henry MD;  [facility]    CYSTOSCOPY, FULGURATE BLADDER TUMOR, COMBINED N/A 1/13/2020    Procedure: CYSTOSCOPY, WITH  bladder biopsies and fulguration;  Surgeon: Stuart King MD;  [facility]    CYSTOSCOPY, REMOVE STENT(S), COMBINED  11/13/2018    Procedure: Flexible Cystoscopy with Stent Removal;  Surgeon: Stuart King MD;  [facility]    DAVINCI LYMPHADENECTOMY N/A 11/13/2018    Procedure: Davinci Lymphadenectomy ;  Surgeon: Stuart King MD;  [facility]    DAVINCI NEPHROURETERECTOMY N/A 11/13/2018    Procedure: Right  DaVinci Assisted Nephroureterectomy;  Surgeon: Stuart King MD;  [facility]    ENDOSCOPIC ULTRASOUND LOWER GASTROINTESTIONAL TRACT (GI) N/A 10/30/2015    Procedure: ENDOSCOPIC ULTRASOUND LOWER GASTROINTESTIONAL TRACT (GI);  Surgeon: Daniel Jean-Baptiste MD;  [facility]    ESOPHAGOSCOPY, GASTROSCOPY, DUODENOSCOPY (EGD), COMBINED N/A 11/29/2023    Procedure: Esophagoscopy, gastroscopy, duodenoscopy (EGD), combined;  Surgeon: Justin Peñaloza MD;  [facility]    EYE SURGERY  12/4/17    INSERT PORT VASCULAR ACCESS Right 12/19/2018    Procedure: Chest Port Placement - right;  Surgeon: Stuart Chavez PA-C;  [facility]    IR CHEST PORT PLACEMENT > 5 YRS OF AGE  12/19/2018    IR PORT REMOVAL RIGHT  6/26/2019    LAPAROSCOPIC CHOLECYSTECTOMY WITH CHOLANGIOGRAMS N/A 11/1/2015    Procedure: LAPAROSCOPIC CHOLECYSTECTOMY WITH CHOLANGIOGRAMS;  Surgeon: Tonie Warren MD;  [facility]    REMOVE PORT VASCULAR ACCESS Right 6/26/2019    Procedure: Right Port Removal;  Surgeon: Froilan Irizarry PA-C;  [facility]    SURGICAL HISTORY OF -   1996    malignant melanoma    SURGICAL HISTORY OF -   1968    thyroid nodule    SURGICAL HISTORY OF -       D & C    ZZC NONSPECIFIC PROCEDURE  2005    colonoscopy polyp repeat 2010       Family History   Problem Relation Age of Onset    Cancer Father         dec - esophageal and laryngeal    Heart Disease Mother     Respiratory Mother         dec    Breast Cancer Daughter     Other Cancer Daughter     Thyroid Disease Daughter     Asthma Daughter     Hyperlipidemia Son     Diabetes Son     Anesthesia Reaction No family hx of     Deep Vein Thrombosis (DVT) No family hx of        Social History     Tobacco Use    Smoking status: Never     Passive exposure: Never    Smokeless tobacco: Never   Substance Use Topics    Alcohol use: No     Alcohol/week: 0.0 standard drinks of alcohol     Comment: rare        O:    Gen: no acute distress  HEENT:  NCAT  Neck: normal ROM  Resp: nonlabored breathing  Neuro: no gross deficits  Psych: appropriate mood and affect    Assessment and Plan:    # coffee ground emesis - resolved - could be emetogenic injury, shefali lesions, etc.  EGD with hiatal hernia but otherwise normal.  Given that symptoms have resolved wouldn't recommend further work-up at this time.  Would continue daily PPI therapy given large hiatal hernia and blood thinner use.  Given that hemoglobin is normal - is reasonable to stop iron supplements and monitor - can recheck in about 3 months    RTC as needed    Tamela Freitas, DO     Video-Visit Details     Type of service:  Video Visit     Video Start Time:   Video End Time (time video stopped): 10:02 AM    Originating Location (pt. Location): home     Distant Location (provider location):  remote     Mode of Communication:  Video Conference via [employer]

## 2024-01-03 NOTE — PATIENT INSTRUCTIONS
Please continue daily omeprazole.  You can stop iron - we should recheck in about 3 months. Please let your primary doctor know you are stopping the iron pills.

## 2024-01-03 NOTE — PROGRESS NOTES
"Virtual Visit Details    Type of service:  Video Visit   Video Start Time: {video visit start/end time for provider to select:398593}  Video End Time:{video visit start/end time for provider to select:688097}    Originating Location (pt. Location): {video visit patient location:659720::\"Home\"}  {PROVIDER LOCATION On-site should be selected for visits conducted from your clinic location or adjoining [facility] hospital, academic office, or other nearby [facility] building. Off-site should be selected for all other provider locations, including home:[id number]}  Distant Location (provider location):  {virtual location provider:238376}  Platform used for Video Visit: {Virtual Visit Platforms:480992::\"[employer]\"}  "

## 2024-01-08 NOTE — NURSING NOTE
"Chief Complaint   Patient presents with     Cystoscopy       Blood pressure (!) 151/84, pulse 108, height 1.448 m (4' 9\"), weight 76.2 kg (168 lb), not currently breastfeeding. Body mass index is 36.35 kg/m .    Patient Active Problem List   Diagnosis     Esophageal reflux     Restless leg syndrome     heat intolerance     Goiter     Disorder of bone and cartilage     Other psoriasis     perirectal cyst     Malignant melanoma of skin of trunk, except scrotum (H)     Nontoxic multinodular goiter     Hyperlipidemia LDL goal <130     Hypertension goal BP (blood pressure) < 140/90     Urinary incontinence     Hip arthritis     Polymyalgia rheumatica (H)     High risk medication use     Shoulder pain     Impaired fasting blood sugar     Chronic bilateral low back pain without sciatica     Obesity, unspecified obesity severity, unspecified obesity type     Iron deficiency anemia, unspecified iron deficiency anemia type     NSTEMI (non-ST elevated myocardial infarction) (H)     Stress-induced cardiomyopathy     A-fib (H)     Anxiety     Chronic systolic heart failure (H)     Urothelial carcinoma (H)     Hyperthyroidism     Hydronephrosis     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)     Graves disease     Encounter for antineoplastic chemotherapy     Primary localized osteoarthritis of right knee     Urothelial cancer (H)     Malignant neoplasm of overlapping sites of bladder (H)     Primary osteoarthritis of right knee     Chronic kidney disease, stage 3 (H)     Lipedematous scalp     Atrial fibrillation with rapid ventricular response (H)     Morbid obesity (H)     Aneurysm of renal artery (H)     Osteopenia, unspecified location     Asthma       Allergies   Allergen Reactions     Codeine        Current Outpatient Medications   Medication Sig Dispense Refill     alendronate (FOSAMAX) 70 MG tablet TAKE 1 TABLET EVERY 7 DAYS AT LEAST 60 MINUTES BEFORE BREAKFAST AS DIRECTED 12 tablet 2     cephALEXin (KEFLEX) 500 MG " [facility]    Elroy Morel Sr. MRN:[id number] YOB: 1980  Date of Service: 01/08/2024  Primary care provider: Latosha Baires  Requesting physician: Elroy Rankin MD      REASON FOR CONSULT: CKD    HISTORY OF PRESENT ILLNESS:   Elroy Morel Sr. is a 43 year old male who first presented for evaluation of CKD post-JOCELYN in the setting of alcoholic liver disease in November 2024.  The past medical history is significant for alcohol dependence leading to cirrhosis and multiple hospitalizations over the past months, last from October 13 th till October 16th 2023. His hospitalizations have been complicated by episodes of JOCELYN with the most severe episode occurring early September with a creatinine level that peaked at 7.7 mg/dL. He has been needing therapeutic paracentesis twice a week. He reports that he has been abstinent since mid-August 2023. Over the past months his creatinine level has been fluctuating between 1.6 and 2.2 mg/dL and it was 1.8 mg/dL on 11/24/2023. It has improved to 1.47 mg/dL on 1/5/2024. He has no evidence of any significant albuminuria with a negative uACR on 11/24/2023. A kidney ultrasound done early September 2023 shows normal size kidneys. On his first visit, he was started on diuretics and is currently on furosemide 20 mg daily and spironolactone 75 mg daily. He has been needing less frequent paracentesis with less fluid retrieved at each time. His blood pressure has been rising and as a result, his PCP recently increased his carvedilol to 6.25 mg twice daily. He has undergone a preliminary evaluation for liver transplant.    The patient denies any dysuria, any pollakiuria, any nocturia, any LE edema, any dyspnea on exertion .  The patient denies ever having kidney stones, urinary tract infections, gross hematuria. There is no family history of CKD.  The following portions of the patient's history were reviewed and updated as appropriate: allergies, current medications,  capsule Take 1 capsule (500 mg) by mouth 2 times daily 14 capsule 0     diltiazem ER (DILT-XR) 180 MG 24 hr capsule Take 1 capsule (180 mg) by mouth daily 90 capsule 0     ferrous sulfate 325 (65 Fe) MG TBEC EC tablet Take 325 mg by mouth every morning        furosemide (LASIX) 20 MG tablet TAKE 1 TABLET TWICE DAILY IN THE MORNING AND AFTER LUNCH 180 tablet 1     irbesartan (AVAPRO) 300 MG tablet TAKE 1 TABLET EVERY DAY 90 tablet 0     levofloxacin (LEVAQUIN) 500 MG tablet Take one tablet six hours after treatment and one tablet morning after treatment 6 tablet 11     lovastatin (MEVACOR) 40 MG tablet Take 1 tablet (40 mg) by mouth At Bedtime 90 tablet 3     methimazole (TAPAZOLE) 5 MG tablet TAKE 1 TABLET ALTERNATING WITH 1/2 TABLET EVERY OTHER DAY 30 tablet 1     Omega-3 Fatty Acids (FISH OIL) 500 MG CAPS Take 1 capsule by mouth every morning        omeprazole (PRILOSEC) 20 MG DR capsule TAKE 1 CAPSULE EVERY DAY 90 capsule 1     propranolol ER (INDERAL LA) 60 MG 24 hr capsule TAKE 1 CAPSULE EVERY DAY 90 capsule 3     RESTASIS 0.05 % ophthalmic emulsion        rivaroxaban ANTICOAGULANT (XARELTO) 10 MG TABS tablet Take 1 tablet (10 mg) by mouth daily (with dinner) 90 tablet 1     rOPINIRole (REQUIP) 0.25 MG tablet Take 2 tablets (0.5 mg) by mouth At Bedtime 180 tablet 0     sertraline (ZOLOFT) 100 MG tablet TAKE 1 TABLET EVERY MORNING 90 tablet 3     traZODone (DESYREL) 50 MG tablet TAKE 1/2 TABLET AT BEDTIME 45 tablet 3       Social History     Tobacco Use     Smoking status: Never Smoker     Smokeless tobacco: Never Used   Vaping Use     Vaping Use: Never used   Substance Use Topics     Alcohol use: No     Alcohol/week: 0.0 standard drinks     Comment: rare     Drug use: No       Invasive Procedure Safety Checklist:    Procedure: Cystoscopy    Action: Complete sections and checkboxes as appropriate.    Pre-procedure:  1. Patient ID Verified with 2 identifiers (Bettie and  or MRN) : YES    2. Procedure and site  past family history, past medical history, past social history, past surgical history and problem list.    PAST MEDICAL HISTORY:  Past Medical History:   Diagnosis Date    Alcohol use disorder, severe, dependence (H)     Alcohol withdrawal seizure (H)     Alcoholic cirrhosis of liver with ascites (H)     Esophageal varices (H)     Essential hypertension     Gastroesophageal reflux disease without esophagitis     History of methamphetamine use     Sustained remission since 2003    Hypercholesterolemia     Tobacco abuse      PAST SURGICAL HISTORY:  Past Surgical History:   Procedure Laterality Date    COLONOSCOPY N/A 8/28/2023    Procedure: COLONOSCOPY, WITH POLYPECTOMY via bx forceps;  Surgeon: Alyssa Tsai MD;  Location: [facility]    IR PARACENTESIS  8/31/2023    IR PARACENTESIS  9/15/2023    IR PARACENTESIS  9/19/2023    IR PARACENTESIS  9/22/2023    IR PARACENTESIS  9/26/2023    IR PARACENTESIS  9/29/2023    IR PARACENTESIS  10/10/2023    IR PARACENTESIS  10/17/2023    IR PARACENTESIS  10/20/2023    IR PARACENTESIS  10/24/2023    IR PARACENTESIS  10/27/2023    IR PARACENTESIS  10/31/2023    IR PARACENTESIS  11/3/2023    IR PARACENTESIS  11/7/2023    IR PARACENTESIS  11/10/2023    IR PARACENTESIS  11/14/2023    IR PARACENTESIS  11/17/2023    IR PARACENTESIS  11/21/2023    IR PARACENTESIS  11/24/2023    IR PARACENTESIS  11/28/2023    IR PARACENTESIS  12/1/2023    IR PARACENTESIS  12/5/2023    IR PARACENTESIS  12/8/2023    IR PARACENTESIS  12/15/2023    IR PARACENTESIS  1/4/2024    IR PARACENTESIS  12/29/2023    IR PARACENTESIS  12/22/2023    IR PARACENTESIS  12/19/2023     MEDICATIONS:  Prescription Medications as of 1/8/2024         Rx Number Disp Refills Start End Last Dispensed Date Next Fill Date Owning Pharmacy    Calcium Carb-Cholecalciferol 500-10 MG-MCG CHEW  -- -- 9/1/2023 --       Sig: Chew 1 Tablet by mouth once daily.    Class: Historical    carvedilol (COREG) 3.125 MG tablet  90 tablet 3  11/28/2023 --   [facility] #[id number] - [place], [place] - [address] AT [address] [facility]    Sig: Take 1 tablet (3.125 mg) by mouth 2 times daily (with meals)    Class: E-Prescribe    Route: Oral    cholestyramine light (QUESTRAN) 4 GM packet  30 packet 1 10/15/2023 --   [facility] - [place], [place] - [address] [facility]    Sig: Take 1 packet (4 g) by mouth daily (with breakfast)    Class: E-Prescribe    Route: Oral    Renewals       Renewal requests to authorizing provider (Raymundo Rodriguez) <b>prohibited</b>            folic acid (FOLVITE) 1 MG tablet  90 tablet 1 11/27/2023 5/25/2024   [id number] - [place], [place] - [address] AT [address] [facility]    Sig: Take 1 tablet (1 mg) by mouth daily for 180 days    Class: E-Prescribe    Route: Oral    furosemide (LASIX) 20 MG tablet  90 tablet 1 11/28/2023 5/26/2024   [id number] - [place], [place] - [address] AT [address] [facility]    Sig: Take 1 tablet (20 mg) by mouth daily for 180 days    Class: E-Prescribe    Route: Oral    lactulose 20 GM/30ML solution  946 mL 3 11/28/2023 --   [facility] #[id number] - [place], [place] - [address] AT [address] [facility] [place] [facility]    Sig: Take 45 mLs (30 g) by mouth 3 times daily Goal 4-5 stools daily.    Class: E-Prescribe    Route: Oral    ondansetron (ZOFRAN) 4 MG tablet  30 tablet 3 11/28/2023 --   [facility] #[id number] - [place], [place] - [address] AT [address] [facility]    Sig: Take 1 tablet (4 mg) by mouth every 8 hours as needed for nausea    Class: E-Prescribe    Route: Oral    pantoprazole (PROTONIX) 40 MG EC tablet  30 tablet 3 10/15/2023 --   [facility] - [place], [place] - [address] [facility]    Sig: Take 1 tablet (40 mg) by mouth daily    Class: E-Prescribe    Route: Oral    Renewals       Renewal requests to authorizing provider (Raymundo Rodriguez) <b>prohibited</b>            potassium chloride ER  verified with patient/designee (when able) : YES    3. Accurate consent documentation in medical record : YES    4. H&P (or appropriate assessment) documented in medical record : N/A  H&P must be up to 30 days prior to procedure an updated within 24 hours of                 Procedure as applicable.     5. Relevant diagnostic and radiology test results appropriately labeled and displayed as applicable : YES    6. Blood products, implants, devices, and/or special equipment available for the procedure as applicable : YES    7. Procedure site(s) marked with provider initials [Exclusions: none] : NO    8. Marking not required. Reason : Yes  Procedure does not require site marking    Time Out:     Time-Out performed immediately prior to starting procedure, including verbal and active participation of all team members addressing: YES    1. Correct patient identity.  2. Confirmed that the correct side and site are marked.  3. An accurate procedure to be done.  4. Agreement on the procedure to be done.  5. Correct patient position.  6. Relevant images and results are properly labeled and appropriately displayed.  7. The need to administer antibiotics or fluids for irrigation purposes during the procedure as applicable.  8. Safety precautions based on patient history or medication use.    During Procedure: Verification of correct person, site, and procedure occurs any time the responsibility for care of the patient is transferred to another member of the care team.      Lamar Adamson  8/12/2022  8:06 AM   (KLOR-CON M) 10 MEQ CR tablet  30 tablet 3 12/10/2023 --   [facility] #[id number] - [place], [place] - [address] [facility]    Sig: Take 2 tablets (20 mEq) by mouth 2 times daily    Class: E-Prescribe    Route: Oral    rifaximin (XIFAXAN) 550 MG TABS tablet  60 tablet 3 2023 --   [facility] #[id number] - [place], [place] - [address] [facility]    Si tablet (550 mg) by Oral or Feeding Tube route 2 times daily    Class: E-Prescribe    Route: Oral or Feeding Tube    sertraline (ZOLOFT) 50 MG tablet  30 tablet 3 10/15/2023 --   [address]    Sig: Take 1 tablet (50 mg) by mouth daily    Class: E-Prescribe    Route: Oral    Renewals       Renewal requests to authorizing provider (Raymundo Rodriguez) <b>prohibited</b>            spironolactone (ALDACTONE) 25 MG tablet  90 tablet 1 12/10/2023 --   [id number] - [place], [place] - [address] [facility]    Sig: Take 3 tablets (75 mg) by mouth daily    Class: E-Prescribe    Route: Oral    vitamin B1 (B-1) 100 MG tablet  30 tablet 3 10/16/2023 --   [address]    Sig: Take 1 tablet (100 mg) by mouth daily    Class: E-Prescribe    Route: Oral    Renewals       Renewal requests to authorizing provider (Raymundo Rodriguez) <b>prohibited</b>                   ALLERGIES:    Allergies   Allergen Reactions    Dust Mites     Metronidazole Other (See Comments), Dizziness and Unknown    Pollen Extract     Omeprazole Other (See Comments) and Unknown     Irritability (tolerates Protonix well)     REVIEW OF SYSTEMS:    A comprehensive review of systems was performed and found to be negative except as described here or above.  SOCIAL HISTORY:   Social History     Socioeconomic History    Marital status:      Spouse name: Not on file    Number of children: Not on file    Years of  education: Not on file    Highest education level: Not on file   Occupational History    Not on file   Tobacco Use    Smoking status: Former     Types: Cigarettes    Smokeless tobacco: Never   Substance and Sexual Activity    Alcohol use: Not Currently     Comment: last drink 8/19    Drug use: Not Currently     Types: Amphetamines     Comment: Sustained remission    Sexual activity: Not on file   Other Topics Concern    Not on file   Social History Narrative    Pt is , 2 children. Employed.      Social Determinants of Health     Financial Resource Strain: Not on file   Food Insecurity: Not on file   Transportation Needs: Not on file   Physical Activity: Not on file   Stress: Not on file   Social Connections: Not on file   Interpersonal Safety: Not on file   Housing Stability: Not on file     FAMILY MEDICAL HISTORY:   Family History   Problem Relation Age of Onset    Substance Abuse Mother     Coronary Artery Disease Mother     Cerebrovascular Disease Mother     Substance Abuse Father     Substance Abuse Brother     Liver Cancer No family hx of     Liver Disease No family hx of      PHYSICAL EXAM:   There were no vitals taken for this visit.  GENERAL APPEARANCE: alert and no distress  EYES: nonicteric  HENT: mouth without ulcers or lesions  NECK: supple, no adenopathy  RESP: lungs clear to auscultation   CV: regular rhythm, normal rate, no rub  ABDOMEN: soft, nontender, normal bowel sounds, no HSM   Extremities: no clubbing, cyanosis, or edema  MS: no evidence of inflammation in joints, no muscle tenderness  SKIN: no rash  NEURO: mentation intact and speech normal  PSYCH: affect normal/bright   LABS:   Recent Results (from the past 672 hour(s))   Basic metabolic panel  (Ca, Cl, CO2, Creat, Gluc, K, Na, BUN)    Collection Time: 12/12/23  2:52 PM   Result Value Ref Range    Sodium 135 135 - 145 mmol/L    Potassium 3.9 3.4 - 5.3 mmol/L    Chloride 100 98 - 107 mmol/L    Carbon Dioxide (CO2) 25 22 - 29 mmol/L     Anion Gap 10 7 - 15 mmol/L    Urea Nitrogen 14.9 6.0 - 20.0 mg/dL    Creatinine 1.74 (H) 0.67 - 1.17 mg/dL    GFR Estimate 49 (L) >60 mL/min/1.73m2    Calcium 9.1 8.6 - 10.0 mg/dL    Glucose 162 (H) 70 - 99 mg/dL   Basic metabolic panel    Collection Time: 12/19/23  2:55 PM   Result Value Ref Range    Sodium 139 135 - 145 mmol/L    Potassium 4.5 3.4 - 5.3 mmol/L    Chloride 108 (H) 98 - 107 mmol/L    Carbon Dioxide (CO2) 22 22 - 29 mmol/L    Anion Gap 9 7 - 15 mmol/L    Urea Nitrogen 14.5 6.0 - 20.0 mg/dL    Creatinine 1.62 (H) 0.67 - 1.17 mg/dL    GFR Estimate 54 (L) >60 mL/min/1.73m2    Calcium 9.1 8.6 - 10.0 mg/dL    Glucose 98 70 - 99 mg/dL   CBC with platelets    Collection Time: 12/19/23  2:56 PM   Result Value Ref Range    WBC Count 7.3 4.0 - 11.0 10e3/uL    RBC Count 3.27 (L) 4.40 - 5.90 10e6/uL    Hemoglobin 9.9 (L) 13.3 - 17.7 g/dL    Hematocrit 29.4 (L) 40.0 - 53.0 %    MCV 90 78 - 100 fL    MCH 30.3 26.5 - 33.0 pg    MCHC 33.7 31.5 - 36.5 g/dL    RDW 15.6 (H) 10.0 - 15.0 %    Platelet Count 127 (L) 150 - 450 10e3/uL   INR    Collection Time: 12/19/23  2:56 PM   Result Value Ref Range    INR 1.38 (H) 0.85 - 1.15   CBC with platelets    Collection Time: 01/05/24  2:46 PM   Result Value Ref Range    WBC Count 5.5 4.0 - 11.0 10e3/uL    RBC Count 3.44 (L) 4.40 - 5.90 10e6/uL    Hemoglobin 10.3 (L) 13.3 - 17.7 g/dL    Hematocrit 31.5 (L) 40.0 - 53.0 %    MCV 92 78 - 100 fL    MCH 29.9 26.5 - 33.0 pg    MCHC 32.7 31.5 - 36.5 g/dL    RDW 16.1 (H) 10.0 - 15.0 %    Platelet Count 111 (L) 150 - 450 10e3/uL   Comprehensive metabolic panel (BMP + Alb, Alk Phos, ALT, AST, Total. Bili, TP)    Collection Time: 01/05/24  2:46 PM   Result Value Ref Range    Sodium 140 135 - 145 mmol/L    Potassium 4.6 3.4 - 5.3 mmol/L    Carbon Dioxide (CO2) 23 22 - 29 mmol/L    Anion Gap 9 7 - 15 mmol/L    Urea Nitrogen 14.4 6.0 - 20.0 mg/dL    Creatinine 1.47 (H) 0.67 - 1.17 mg/dL    GFR Estimate 60 (L) >60 mL/min/1.73m2    Calcium 9.4  8.6 - 10.0 mg/dL    Chloride 108 (H) 98 - 107 mmol/L    Glucose 143 (H) 70 - 99 mg/dL    Alkaline Phosphatase 143 40 - 150 U/L    AST 57 (H) 0 - 45 U/L    ALT 23 0 - 70 U/L    Protein Total 6.9 6.4 - 8.3 g/dL    Albumin 3.2 (L) 3.5 - 5.2 g/dL    Bilirubin Total 0.9 <=1.2 mg/dL   INR    Collection Time: 01/05/24  2:46 PM   Result Value Ref Range    INR 1.37 (H) 0.85 - 1.15     CMP  Recent Labs   Lab Test 01/05/24  1446 12/19/23  1455 12/12/23  1452 12/07/23  1539 12/04/23  1022 11/24/23  0859 10/16/23  0526 10/14/23  0938 10/13/23  1320 09/02/23  2031 08/30/23  0533 08/29/23  0803 08/29/23  0606 08/28/23  0645 08/26/23  0653 08/25/23  0620 09/15/21  1235 08/27/20  1401 06/25/20  1501    139 135 134* 134* 134* 127*   < > 125*   < > 125*  --  128* 130*   < > 131*   < > 140 138   POTASSIUM 4.6 4.5 3.9 2.9* 2.8* 3.5 3.4   < > 3.8   < > 4.2  --  4.2 4.1   < > 3.8   < > 4.5 4.4   CHLORIDE 108* 108* 100 100 102 106 95*   < > 93*   < > 96*  --  98 100   < > 101   < > 103 102   CO2 23 22 25 24 21* 17* 17*   < > 16*   < > 16*  --  17* 16*   < > 17*   < > 23 24   ANIONGAP 9 9 10 10 11 11 15   < > 16*   < > 13  --  13 14   < > 13   < > 14 12   * 98 162* 113* 147* 161* 100*   < > 131*   < > 85  --  87 115*   < > 89   < > 89 104   BUN 14.4 14.5 14.9 12.3 14.4 18.0 28.5*   < > 36.4*   < > 45.5*  --  40.0* 41.4*   < > 47.5*   < > 10 12   CR 1.47* 1.62* 1.74* 1.75* 1.86* 1.81* 1.74*   < > 2.26*   < > 4.34*  --  3.44* 3.72*   < > 5.40*   < > 1.11 1.23   GFRESTIMATED 60* 54* 49* 49* 45* 47* 49*   < > 36*   < > 16*  --  22* 20*   < > 13*   < > >60 >60   GFRESTBLACK  --   --   --   --   --   --   --   --   --   --   --   --   --   --   --   --   --  >60 >60   ENEDINA 9.4 9.1 9.1 8.8 8.4* 9.2 8.8   < > 9.1   < > 9.0  --  8.9 9.1   < > 9.2   < > 9.6 10.1   MAG  --   --   --   --   --   --   --   --  1.9  --  1.8  --  1.8 1.9   < > 2.0   < >  --   --    PHOS  --   --   --   --   --  3.1  --   --   --   --   --  3.5  --  3.4  --   3.9   < >  --   --    PROTTOTAL 6.9  --   --   --  7.0 7.5 6.5   < > 7.5   < >  --   --   --   --   --   --    < > 7.7 7.6   ALBUMIN 3.2*  --   --   --  3.2* 3.3* 3.8   < > 3.1*  3.1*   < > 3.0*  --  3.3* 3.4*   < > 3.6   < > 4.4 4.2   BILITOTAL 0.9  --   --   --  1.6* 2.3* 9.8*   < > 15.1*   < > 33.6*  --  34.3* 34.9*   < > 30.3*   < > 0.6 0.4   ALKPHOS 143  --   --   --  198* 195* 152*   < > 256*   < > 125  --  122 110   < > 103   < > 54 59   AST 57*  --   --   --  138* 103* 55*   < > 86*   < > 184*  --  196* 179*   < > 155*   < > 83* 91*   ALT 23  --   --   --  42 38 26   < > 57   < > 52  --  57 57   < > 44   < > 122* 133*    < > = values in this interval not displayed.     CBC  Recent Labs   Lab Test 01/05/24 1446 12/19/23 1456 12/04/23 1022 11/24/23  0859   HGB 10.3* 9.9* 10.6* 10.3*   WBC 5.5 7.3 4.5 9.8   RBC 3.44* 3.27* 3.50* 3.32*   HCT 31.5* 29.4* 31.3* 30.2*   MCV 92 90 89 91   MCH 29.9 30.3 30.3 31.0   MCHC 32.7 33.7 33.9 34.1   RDW 16.1* 15.6* 16.8* 17.1*   * 127* 106* 139*     INR  Recent Labs   Lab Test 01/05/24 1446 12/19/23 1456 12/04/23  1022 11/24/23  0859 09/03/23  0644 09/02/23  2111 08/27/23  0745 08/24/23  0618   INR 1.37* 1.38* 1.28* 1.36*   < > 1.46*   < > 1.72*   PTT  --   --   --   --   --  42*  --  37    < > = values in this interval not displayed.     ABG  Recent Labs   Lab Test 09/06/23  2045   O2PER 21      URINE STUDIES  Recent Labs   Lab Test 11/24/23  0902 10/14/23  1636 10/03/23  1953 09/10/23  2356   COLOR Yellow Dark Yellow* Dark Yellow* Dark Yellow*   APPEARANCE Clear Clear Clear Clear   URINEGLC Negative Negative Negative Negative   URINEBILI Small* Small* Moderate* Moderate*   URINEKETONE Negative Negative Negative Negative   SG 1.020 1.018 1.020 1.015   UBLD Negative Negative Negative Negative   URINEPH 6.5 5.5 5.5 5.5   PROTEIN 30* 10* 10* Negative   UROBILINOGEN 0.2  --   --   --    NITRITE Negative Negative Negative Negative   LEUKEST Negative Negative  Negative Negative   RBCU 2-5* 1 <1 0   WBCU 5-10* 1 3 1     No lab results found.    ASSESSMENT AND PLAN:   #CKD stage 3 b mostly secondary to hepatorenal syndrome and multiple episodes of JOCELYN with no significant proteinuria and unremarkable kidneys on imaging. Refractory ascites and hemodynamic fluctuations in the setting of recurrent paracentesis could also be contributory. I will increase spironolactone to 100 mg daily and pursue furosemide 20 mg daily in order to minimize paracentesis as much as possible with very close follow up and labs. The patient was instructed to keep a BP diary and to weigh himself on a daily basis.  No documented intake of NSAIDs or other nephrotoxic medications.   The patient was instructed keep the sodium intake around 2400 mg /day, follow a plant-based diet and to avoid NSAIDs     #HTN  Primary and secondary to CKD. On carvedilol, spironolactone and furosemide. Management as per above.    #Blood count  Hemoglobin 10.3  Iron sat 31%  Ferritin level 525 could benefit from oral iron    #Acid-base status  CO2 level 17 -> 23 improved and normalized    #Electrolytes  Na 134 -> 140 mild hyponatremia that has resolved as his liver function continues to improve  K 3.5 -> 4.6 decrease potassium supplementation to 20 mEq daily     #BMD  Calcium 9.2          Phosphorus 3.1    albumin 3.3  Vitamin D level 20 would benefit from vitamin D supplementation    #CKD journey/transplant not a candidate at this point    The total time of this encounter amounted to 30 minutes on the day of the encounter. This time included time spent with the patient, reviewing records, ordering tests, and performing post visit documentation.       The patient will return to follow up in 4 weeks    Didi Fisher MD  Division of Renal Disease and Hypertension

## 2024-01-09 ENCOUNTER — TELEPHONE (OUTPATIENT)
Dept: FAMILY MEDICINE | Facility: CLINIC | Age: 89
End: 2024-01-09
Payer: MEDICARE

## 2024-01-09 NOTE — TELEPHONE ENCOUNTER
Home Care is calling regarding an established patient with [facility].       Requesting orders from: Pop Zapien  Provider is following patient: Yes  Is this a 60-day recertification request?  No    Orders Requested    Physical Therapy  Request for initial certification (first set of orders)   Frequency: 1 x week for 3 weeks and every other week x 6 weeks      Confirmed ok to leave a detailed message with call back.  Contact information confirmed and updated as needed.    [facility]: [phone]    Maxim Shah RN

## 2024-01-10 ENCOUNTER — TELEPHONE (OUTPATIENT)
Dept: FAMILY MEDICINE | Facility: CLINIC | Age: 89
End: 2024-01-10
Payer: MEDICARE

## 2024-01-10 NOTE — TELEPHONE ENCOUNTER
Home care Physical Therapist was doing the start of care.  They got a med interaction.  Med interaction of level 1 with diltiazem and lovastatin.    Informing PCP.  ATUL Lee

## 2024-01-10 NOTE — TELEPHONE ENCOUNTER
Spoke with Tatiana and relayed no changes to medications.    Talia QUIROGA RN  [facility]

## 2024-01-10 NOTE — TELEPHONE ENCOUNTER
Writer called and left message on [facility]'s confidential regarding verbal orders for home care.     SHASHANK BlancoN RN  [facility]

## 2024-01-17 ENCOUNTER — TELEPHONE (OUTPATIENT)
Dept: FAMILY MEDICINE | Facility: CLINIC | Age: 89
End: 2024-01-17
Payer: MEDICARE

## 2024-01-17 DIAGNOSIS — Z53.9 DIAGNOSIS NOT YET DEFINED: Primary | ICD-10-CM

## 2024-01-17 PROCEDURE — G0180 MD CERTIFICATION HHA PATIENT: HCPCS | Performed by: FAMILY MEDICINE

## 2024-01-17 NOTE — TELEPHONE ENCOUNTER
Radha from [facility] calling back. Stated that page 1 of the 5 pages from the form did not come through in the fax. Stated that the fax cover sheet and then pages 2-5 were received but not page 1. Please refax.

## 2024-01-17 NOTE — TELEPHONE ENCOUNTER
Forms/Letter Request    Type of form/letter: Home Health Certification    OTHER: cert/ plan of care 1/9/2024 - 3/8/2024        Do we have the form/letter: Yes: placed in care team 2 providers sign folder    Who is the form from? Home care    Where did/will the form come from? form was faxed in    When is form/letter needed by: non given    How would you like the form/letter returned: Fax : [phone]

## 2024-01-22 DIAGNOSIS — I48.20 CHRONIC ATRIAL FIBRILLATION (H): ICD-10-CM

## 2024-01-25 NOTE — TELEPHONE ENCOUNTER
XARELTO ANTICOAGULANT 10 MG   Last Written Prescription Date:  2/6/23  Last Fill Quantity: 30,   # refills: 0  Last Office Visit : 12/7/23  Future Office visit:  NONE  Routing refill request to provider for review/approval because:  REFILL GAP   / LIMITED REFILL #30

## 2024-01-26 NOTE — TELEPHONE ENCOUNTER
Staff message sent to ALYSSA Luna to see if Xarelto dose should be increased due to negative work up with GI and stable hemoglobin.

## 2024-01-27 ENCOUNTER — MYC MEDICAL ADVICE (OUTPATIENT)
Dept: CARDIOLOGY | Facility: CLINIC | Age: 89
End: 2024-01-27
Payer: MEDICARE

## 2024-01-27 DIAGNOSIS — I48.20 CHRONIC ATRIAL FIBRILLATION (H): ICD-10-CM

## 2024-01-29 RX ORDER — RIVAROXABAN 10 MG/1
TABLET, FILM COATED ORAL
Qty: 90 TABLET | Refills: 3 | OUTPATIENT
Start: 2024-01-29

## 2024-01-29 NOTE — TELEPHONE ENCOUNTER
Chandrika Camp PA-C Kelling, Maria, RN Hello,  Her CrCl is about 45, so I would recommend we do 15 mg daily.    Thanks!  Chandrika

## 2024-02-01 ENCOUNTER — TELEPHONE (OUTPATIENT)
Dept: FAMILY MEDICINE | Facility: CLINIC | Age: 89
End: 2024-02-01
Payer: MEDICARE

## 2024-02-01 NOTE — TELEPHONE ENCOUNTER
Forms/Letter Request    Type of form/letter: Home Health Certification plan of care 1/9/2024 - 3/8/2024      Do we have the form/letter: Yes: placed in care team 2 providers sign folder    Who is the form from? Home care    Where did/will the form come from? form was faxed in    When is form/letter needed by: non given    How would you like the form/letter returned: Fax : [phone]

## 2024-02-05 DIAGNOSIS — Z53.9 DIAGNOSIS NOT YET DEFINED: Primary | ICD-10-CM

## 2024-02-05 PROCEDURE — G0180 MD CERTIFICATION HHA PATIENT: HCPCS | Performed by: FAMILY MEDICINE

## 2024-02-08 DIAGNOSIS — E78.5 HYPERLIPIDEMIA LDL GOAL <130: ICD-10-CM

## 2024-02-09 RX ORDER — LOVASTATIN 40 MG
TABLET ORAL
Qty: 90 TABLET | Refills: 0 | Status: SHIPPED | OUTPATIENT
Start: 2024-02-09 | End: 2024-04-17

## 2024-02-14 ENCOUNTER — PATIENT OUTREACH (OUTPATIENT)
Dept: CARE COORDINATION | Facility: CLINIC | Age: 89
End: 2024-02-14
Payer: MEDICARE

## 2024-02-26 ENCOUNTER — TELEPHONE (OUTPATIENT)
Dept: FAMILY MEDICINE | Facility: CLINIC | Age: 89
End: 2024-02-26
Payer: MEDICARE

## 2024-02-26 NOTE — TELEPHONE ENCOUNTER
Home Health Care    Reason for call:  Additional orders for PT since she missed a visit last week    Orders are needed for this patient.  PT: 1 additional visit for this week    Pt Provider: Dr. Pop Zapien    Phone Number Homecare Nurse can be reached at: [phone] okay to leave detailed message

## 2024-02-27 NOTE — TELEPHONE ENCOUNTER
Ok relayed to home care on identified secure  voice mail    Radha See, RN, BSN  [facility]

## 2024-02-28 ENCOUNTER — PATIENT OUTREACH (OUTPATIENT)
Dept: CARE COORDINATION | Facility: CLINIC | Age: 89
End: 2024-02-28
Payer: MEDICARE

## 2024-03-01 DIAGNOSIS — G47.00 INSOMNIA, UNSPECIFIED TYPE: ICD-10-CM

## 2024-03-01 RX ORDER — TRAZODONE HYDROCHLORIDE 50 MG/1
TABLET, FILM COATED ORAL
Qty: 45 TABLET | Refills: 3 | Status: SHIPPED | OUTPATIENT
Start: 2024-03-01

## 2024-03-03 ENCOUNTER — NURSE TRIAGE (OUTPATIENT)
Dept: FAMILY MEDICINE | Facility: CLINIC | Age: 89
End: 2024-03-03
Payer: MEDICARE

## 2024-03-03 NOTE — TELEPHONE ENCOUNTER
Nurse Triage SBAR    Situation:   Covid     Background:   -son eugene calling, It is okay to leave a detailed message at this number.   [phone]    Assessment:   Date of positive COVID test (PCR or at home)? 3/2/2024  Current COVID symptoms:   -cough   -sore throat  -chest congstion  Date COVID symptoms began: 3/1/2024 (day 2 today)  -they would like care faster than a call back tomorrow  High Risk: age  PCP: Pop Zapien MD   Last visit: 12/18/24    Recommendation:   -RN triaged patient, and gave alternative care options ([facility] health)  -[facility] but would still like a RN call back as they are unsure of the [facility] program  -Call back with and questions, concerns, or any change in symptoms    STACIE AMARAL RN on 3/3/2024 at 5:08 PM    Reason for Disposition   [1] HIGH RISK patient (e.g., weak immune system, age > 64 years, obesity with BMI 30 or higher, pregnant, chronic lung disease or other chronic medical condition) AND [2] COVID symptoms (e.g., cough, fever)  (Exceptions: Already seen by PCP and no new or worsening symptoms.)    Additional Information   Negative: SEVERE difficulty breathing (e.g., struggling for each breath, speaks in single words)   Negative: Difficult to awaken or acting confused (e.g., disoriented, slurred speech)   Negative: Bluish (or gray) lips or face now   Negative: Shock suspected (e.g., cold/pale/clammy skin, too weak to stand, low BP, rapid pulse)   Negative: Sounds like a life-threatening emergency to the triager   Negative: [1] Diagnosed or suspected COVID-19 AND [2] symptoms lasting 3 or more weeks   Negative: [1] COVID-19 exposure AND [2] no symptoms   Negative: COVID-19 vaccine reaction suspected (e.g., fever, headache, muscle aches) occurring 1 to 3 days after getting vaccine   Negative: COVID-19 vaccine, questions about   Negative: [1] Lives with someone known to have influenza (flu test positive) AND [2] flu-like  symptoms (e.g., cough, runny nose, sore throat, SOB; with or without fever)   Negative: [1] Possible COVID-19 symptoms AND [2] triager concerned about severity of symptoms or other causes   Negative: COVID-19 and breastfeeding, questions about   Negative: SEVERE or constant chest pain or pressure  (Exception: Mild central chest pain, present only when coughing.)   Negative: MODERATE difficulty breathing (e.g., speaks in phrases, SOB even at rest, pulse 100-120)   Negative: [1] Headache AND [2] stiff neck (can't touch chin to chest)   Negative: Oxygen level (e.g., pulse oximetry) 90 percent or lower   Negative: Chest pain or pressure  (Exception: MILD central chest pain, present only when coughing.)   Negative: [1] Drinking very little AND [2] dehydration suspected (e.g., no urine > 12 hours, very dry mouth, very lightheaded)   Negative: Patient sounds very sick or weak to the triager   Negative: MILD difficulty breathing (e.g., minimal/no SOB at rest, SOB with walking, pulse <100)   Negative: Fever > 103 F (39.4 C)   Negative: [1] Fever > 101 F (38.3 C) AND [2] age > 60 years   Negative: [1] Fever > 100.0 F (37.8 C) AND [2] bedridden (e.g., CVA, chronic illness, recovering from surgery)   Negative: Oxygen level (e.g., pulse oximetry) 91 to 94 percent    Protocols used: Coronavirus (COVID-19) Diagnosed or [id number]

## 2024-03-03 NOTE — TELEPHONE ENCOUNTER
COVID Positive/Requesting COVID treatment    Patient is positive for COVID and requesting treatment options.    Date of positive COVID test (PCR or at home)? 3/2/2024  Current COVID symptoms: cough and sore throat  Date COVID symptoms began: 3/1/2024    Message should be routed to clinic RN pool. Best phone number to use for call back: [phone]

## 2024-03-04 ENCOUNTER — VIRTUAL VISIT (OUTPATIENT)
Dept: FAMILY MEDICINE | Facility: CLINIC | Age: 89
End: 2024-03-04
Payer: MEDICARE

## 2024-03-04 DIAGNOSIS — U07.1 COVID-19 VIRUS INFECTION: Primary | ICD-10-CM

## 2024-03-04 PROCEDURE — 99441 PR PHYSICIAN TELEPHONE EVALUATION 5-10 MIN: CPT | Mod: 93 | Performed by: NURSE PRACTITIONER

## 2024-03-04 NOTE — PROGRESS NOTES
Dimple is a 89 year old who is being evaluated via a billable telephone visit.      What phone number would you like to be contacted at? [phone]  How would you like to obtain your AVS? Paulina  Originating Location (pt. Location): Home    Distant Location (provider location):  Off-site    Assessment & Plan     COVID-19 virus infection  Patient is on day 4 of very mild COVID infection.  Given recent changes in coverage for Paxlovid and minimal symptoms, we elected for supportive cares.  Recommended staying hydrated and high-protein diet to help with fatigue.  Continue over-the-counter symptomatic control as needed.  Follow-up if symptoms worsen or persist.  Also recommended she mask when going out in her senior living is her still high numbers of influenza and RSV.        Subjective   Dimple is a 89 year old, presenting for the following health issues:  Covid 19 Testing      3/4/2024     2:53 PM   Additional Questions   Roomed by Berenice judd     History of Present Illness       Reason for visit:  Positive covid  Symptom onset:  1-3 days ago  Symptoms include:  Runny nose and cough  Symptom intensity:  Mild  Symptom progression:  Improving  Had these symptoms before:  No    She eats 2-3 servings of fruits and vegetables daily.She consumes 0 sweetened beverage(s) daily.She exercises with enough effort to increase her heart rate 20 to 29 minutes per day.  She exercises with enough effort to increase her heart rate 3 or less days per week.   She is taking medications regularly.       First day sneezing and runny nose, now resolved.  Feeling pretty good in general.  This is day 4.  Feeling fatigued, taste is off.              Objective           Vitals:  No vitals were obtained today due to virtual visit.    Physical Exam   General: Alert and no distress //Respiratory: No audible wheeze, cough, or shortness of breath // Psychiatric:  Appropriate affect, tone, and pace of words            Phone call duration: 5  SUBJECTIVE:   Polly Gonzalez is a 79 year old female who presents for follow-up visit:   Concerns:    Living in [place] [[place] acres and a single floor house]. Lives with her partner Az.  Az states she is not enthralled with the new home.     Dementia on Aricept: Will see neurologist at the end of June 2019. Can do her own ADL's.     Verbal communication is less in the last six months    Motor instability has worsened. Did fall once and Az had a difficult time getting her back on her feet.    Facial picking/excooriation persists     Getting support in the home: ADRC: Elder Benefit speicialist: Anay Ohmsmauricio: [phone].  Getting a couple hours a week [four hours].     Az is her primary support. He says he will do everything to keep her from going to a nursing home    They have not identified a physician closer to their home in [place]   - Hypothyroidism on levothyroxine 100 mcg  - Neck pain persists- uses neck brace in car.   Past Medical History:   Diagnosis Date     Asthma      GERD (gastroesophageal reflux disease)      HX: breast cancer      Hyperlipidemia LDL goal < 130      Hypothyroidism      Mild dementia      Mild major depression (H)      Past Surgical History:   Procedure Laterality Date     BLEPHAROPLASTY  09     BUNIONECTOMY DANICA BILATERAL  1993     face life  09     HERNIA REPAIR, INCISIONAL  1998    ?   LLQ     MASTECTOMY, FLAP TRAM PEDICLE, COMBINED  1992     ROTATOR CUFF REPAIR RT/LT  1990/2001     Social History     Tobacco Use     Smoking status: Never Smoker     Smokeless tobacco: Never Used   Substance Use Topics     Alcohol use: Yes     Alcohol/week: 0.0 oz     Comment: rare     Today's PHQ-2 Score:   PHQ-2 ( 1999 Pfizer) 9/6/2016 1/7/2016   Q1: Little interest or pleasure in doing things - -   Q2: Feeling down, depressed or hopeless - -   PHQ-2 Score - -   Q1: Little interest or pleasure in doing things Several days Several days   Q2: Feeling down, depressed or hopeless Several days  "Several days   PHQ-2 Score 2 2   ROS:  C: NEGATIVE for fever, chills, change in weight  E/M: NEGATIVE for ear, mouth and throat problems  R: NEGATIVE for significant cough or SOB  CV: NEGATIVE for chest pain, palpitations or peripheral edema  OBJECTIVE:   /74   Pulse 66   Ht 1.575 m (5' 2\")   Wt 77.8 kg (171 lb 9.6 oz)   BMI 31.39 kg/m   Estimated body mass index is 31.39 kg/m  as calculated from the following:    Height as of this encounter: 1.575 m (5' 2\").    Weight as of this encounter: 77.8 kg (171 lb 9.6 oz).  EXAM:   Well dressed - presents with male partner -  RANJAN. Flat affect and sits quietly through the visit.   Word finding difficulty - hard to complete a three word sentence. Slow to respond.   Psychological: Quiet.  Slow recall on speech  Facial excoriations     Neck: No thyroidmegaly.  Respiratory: Clear   Musculoskeletal: uses Ranjan's arm to ambulate.     Skin: facial excoriation from picking.   Neurological: normal gait, no tremor.   ASSESSMENT / PLAN:   Diagnoses and associated orders for this visit:  Dementia, progressive    - donepezil (ARICEPT) 10 MG tablet; Take 1 tablet (10 mg) by mouth daily    - Neurology consult end of June 2019  Excoriation of face, sequela  -     DERMATOLOGY REFERRAL  Mild persistent asthma, uncomplicated  -     albuterol (PROAIR HFA/PROVENTIL HFA/VENTOLIN HFA) 108 (90 Base) MCG/ACT inhaler; Inhale 2 puffs into the lungs every 6 hours as needed for shortness of breath / dyspnea or wheezing    Ghazal Lee MD  [facility]  " minutes  Signed Electronically by: NELSON Evangelista CNP

## 2024-03-04 NOTE — TELEPHONE ENCOUNTER
RN COVID TREATMENT VISIT  03/04/24      The patient has been triaged and does not require a higher level of care.    Dimple Oviedo  89 year old  Current weight? 175 lb    Has the patient been seen by a primary care provider at an [facility] or [facility] within the past two years? Yes.   Have you been in close proximity to/do you have a known exposure to a person with a confirmed case of influenza? No.     General treatment eligibility:  Date of positive COVID test (PCR or at home)?  3/2/24    Are you or have you been hospitalized for this COVID-19 infection? No.   Have you received monoclonal antibodies or antiviral treatment for COVID-19 since this positive test? No.   Do you have any of the following conditions that place you at risk of being very sick from COVID-19?   - Age 50 years or older  Yes, patient has at least one high risk condition as noted above.     Current COVID symptoms:   - cough  - shortness of breath or difficulty breathing  - congestion or runny nose  Yes. Patient has at least one symptom as selected.     How many days since symptoms started? 5 days or less. Established patient, 12 years or older weighing at least 88.2 lbs, who has symptoms that started in the past 5 days, has not been hospitalized nor received treatment already, and is at risk for being very sick from COVID-19.     Treatment eligibility by RN:  Are you currently pregnant or nursing? No  Do you have a clinically significant hypersensitivity to nirmatrelvir or ritonavir, or toxic epidermal necrolysis (TEN) or Bhatia-Kyle Syndrome? No  Do you have a history of hepatitis, any hepatic impairment on the Problem List (such as Child-Cuevas Class C, cirrhosis, fatty liver disease, alcoholic liver disease), or was the last liver lab (hepatic panel, ALT, AST, ALK Phos, bilirubin) elevated in the past 6 months? No  Do you have any history of severe renal impairment (eGFR < 30mL/min)? No    Is patient  eligible to continue? Yes, patient meets all eligibility requirements for the RN COVID treatment (as denoted by all no responses above).     Current Outpatient Medications   Medication Sig Dispense Refill    alendronate (FOSAMAX) 70 MG tablet TAKE 1 TABLET EVERY 7 DAYS AT LEAST 60 MINUTES BEFORE BREAKFAST AS DIRECTED. 12 tablet 2    diltiazem ER (DILT-XR) 180 MG 24 hr capsule Take 1 capsule (180 mg) by mouth daily 90 capsule 3    ferrous sulfate 325 (65 Fe) MG TBEC EC tablet Take 325 mg by mouth every morning       furosemide (LASIX) 20 MG tablet TAKE 1 TABLET TWICE DAILY IN THE MORNING  AND  AFTER  LUNCH 180 tablet 3    irbesartan (AVAPRO) 300 MG tablet Take 1 tablet (300 mg) by mouth daily 90 tablet 3    lovastatin (MEVACOR) 40 MG tablet TAKE 1 TABLET AT BEDTIME (SCHEDULE ANNUAL VISIT, NEED FASTING LABS) 90 tablet 0    methimazole (TAPAZOLE) 5 MG tablet TAKE 1 TABLET ALTERNATING WITH 1/2 TABLET EVERY OTHER DAY 66 tablet 3    Omega-3 Fatty Acids (FISH OIL) 500 MG CAPS Take 1 capsule by mouth every morning       omeprazole (PRILOSEC) 20 MG DR capsule Take 1 capsule (20 mg) by mouth daily 90 capsule 3    propranolol ER (INDERAL LA) 60 MG 24 hr capsule TAKE 1 CAPSULE EVERY DAY 90 capsule 3    RESTASIS 0.05 % ophthalmic emulsion       rivaroxaban ANTICOAGULANT (XARELTO) 15 MG TABS tablet Take 1 tablet (15 mg) by mouth daily (with dinner) 90 tablet 3    rOPINIRole (REQUIP) 0.25 MG tablet TAKE 1 TABLET IN THE AFTERNOON AND TAKE 2 TABLETS EVERY NIGHT 270 tablet 3    sertraline (ZOLOFT) 100 MG tablet Take 1 tablet (100 mg) by mouth every morning 90 tablet 3    traZODone (DESYREL) 50 MG tablet TAKE 1/2 TABLET AT BEDTIME 45 tablet 3       Medications from List 1 of the standing order (on medications that exclude the use of Paxlovid) that patient is taking: NONE. Is patient taking [place]'s Wort? No  Is patient taking Roxanne's Wort or any meds from List 1? No.   Medications from List 2 of the standing order (on meds that  provider needs to adjust) that patient is taking: diltiazem (Cardizem, Tiazac), explained a provider visit is necessary to discuss medication adjustments while taking Paxlovid. Is patient on any of the meds from List 2? Yes. Patient will be scheduled or transferred to a  at the end of this call.   King Gonzalez, RN

## 2024-03-08 NOTE — ED PROVIDER NOTES
[facility] ([facility])  4/21/22  History     Chief Complaint   Patient presents with     Nausea     Hematemesis     The history is provided by the patient.     Dimple Oviedo is a 88 year old female who has a significant medical history of A. fib (on Xarelto), CAF, HLD, urothelial cancer, GERD, amongst others, who presents to the Emergency Department with c/o black liquid emesis.  Patient reports 1 episode of vomiting occurring this morning after bowel movement, which produced small amount of black liquid emesis. She denies having history of symptoms.   She reports being s/p cholecystectomy approxi-5 to 6 years ago, denies recent surgery.  Reports taking Xarelto daily for A. fib, denies missing a dose reports a history of epistaxis initially starting Xarelto, the dose has been lowered and problems have resolved since.  No prior history of gastrointestinal bleeding while on anticoagulants.  She reports that her stools are dark at baseline because she takes iron tablets.  She reports calling her doctor's office today reporting symptoms, was referred to the ED. currently she denies any lightheadedness, nausea, abdominal pain.  She does not consume alcohol.  No fevers.  No other concerns or complaints.    Past Medical History  Past Medical History:   Diagnosis Date     Arthritis      Asthma 2/9/2022     Calculus of kidney      Chemotherapy-induced neutropenia (H) 3/6/2019     Congestive heart failure (H)      Esophageal reflux      GERD (gastroesophageal reflux disease)      Hyperlipidemia LDL goal <130 5/9/2010     Malignant melanoma of skin of trunk, except scrotum (H)      Nonspecific abnormal finding     has living will 2004 -      Nontoxic multinodular goiter     no further eval /tx rec per pt     Osteopenia      Other psoriasis      Personal history of colonic polyps      PMR (polymyalgia rheumatica) (H)      Restless legs syndrome 7/11/2018     Stress-induced cardiomyopathy       What to Expect After a Prostate Biopsy    Please be sure to finish your pre-procedure antibiotics as instructed.    You may have mild bleeding from the rectum or urine for about 1 week to 1 month, or in your ejaculate for several months. This bleeding is normal and expected, and it will stop. You may have mild discomfort in your rectal or urethral area for 24-48 hours.    You cannot do any strenuous lifting, straining, or exercising for 24 hours. You may return to full activity the day after the biopsy.    You may continue to take all your regular medications after the procedure except for the blood thinners.    You may resume all blood-thinning medications once you no longer see any bleeding or whenever your physician prescribing the medication says it is all right to do so. You may take Tylenol if you have a fever and your temperature is less than 100° F or if you have some discomfort.    You will receive a call from the Urology Department at [facility] with the results of your prostate biopsy within one week.    Signs and Symptoms to Report    Call your [facility] urologist at [phone] if you develop any of the following:  Temperature greater than 101°  F  Inability to urinate  A large amount of bleeding from the rectum or in the urine  Persistent or severe pain    After hours or on weekends, you may reach a urology resident on call at this number: [phone].   Undiagnosed cardiac murmurs      Unspecified constipation      Unspecified essential hypertension      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     Past Surgical History:   Procedure Laterality Date     ARTHROPLASTY KNEE Right 11/9/2020    Procedure: ARTHROPLASTY, KNEE, TOTAL, RIGHT;  Surgeon: Edward Otero MD;  [facility]: [facility]     BIOPSY       COLONOSCOPY  2014     COMBINED CYSTOSCOPY, INSERT STENT URETER(S) Bilateral 9/12/2018    Procedure: COMBINED CYSTOSCOPY, INSERT STENT URETER(S);  Cystoscopy Bilateral ureteral Stent Placement.;  Surgeon: Justin Henry MD;  [facility]     COMBINED CYSTOSCOPY, RETROGRADES, URETEROSCOPY, INSERT STENT Bilateral 4/3/2018    Procedure: COMBINED CYSTOSCOPY, RETROGRADES, URETEROSCOPY, INSERT STENT;;  Surgeon: Stuart King MD;  [facility]     COMBINED CYSTOSCOPY, RETROGRADES, URETEROSCOPY, INSERT STENT Bilateral 9/10/2018    Procedure: COMBINED CYSTOSCOPY, RETROGRADES, URETEROSCOPY, INSERT STENT;  Cystoscopy, Bilateral Ureteroscopy, Bladder Biopsies, Retrogram Pyelograms, Ureteral Washings and brushings, cysview;  Surgeon: Stuart King MD;  [facility]     COMBINED CYSTOSCOPY, RETROGRADES, URETEROSCOPY, INSERT STENT Left 8/26/2020    Procedure: Cystoscopy, Left Ureteral Wash, Left ureteral Retrograde Pyelogram;  Surgeon: Justin Henry MD;  [facility]     CYSTOSCOPY, BIOPSY BLADDER INSTILL OPTICAL AGENT N/A 4/3/2018    Procedure: CYSTOSCOPY, BIOPSY BLADDER INSTILL OPTICAL AGENT;  Cystoscopy, Blue Light Cystoscopy, Bladder Biopsies, Bilateral Selective ureteral washings for Cytology, Bilateral Retrograde Pyelograms, Bilateral Ureteroscopy;  Surgeon: Stuart King MD;  [facility]     CYSTOSCOPY, BIOPSY BLADDER, COMBINED N/A 2/19/2018    Procedure: COMBINED CYSTOSCOPY, BIOPSY BLADDER;  Cystoscopy, Bladder Biopsy;  Surgeon: Kenna La MD;  [facility]     CYSTOSCOPY, BIOPSY BLADDER, COMBINED N/A  8/26/2020    Procedure: Cystoscopy, biopsy bladder, combined;  Surgeon: Justin Henry MD;  [facility]     CYSTOSCOPY, FULGURATE BLADDER TUMOR, COMBINED N/A 1/13/2020    Procedure: CYSTOSCOPY, WITH  bladder biopsies and fulguration;  Surgeon: Stuart King MD;  [facility]     CYSTOSCOPY, REMOVE STENT(S), COMBINED  11/13/2018    Procedure: Flexible Cystoscopy with Stent Removal;  Surgeon: Stuart King MD;  [facility]     DAVINCI LYMPHADENECTOMY N/A 11/13/2018    Procedure: Davinci Lymphadenectomy ;  Surgeon: Stuart King MD;  [facility]     DAVINCI NEPHROURETERECTOMY N/A 11/13/2018    Procedure: Right DaVinci Assisted Nephroureterectomy;  Surgeon: Stuart King MD;  [facility]     ENDOSCOPIC ULTRASOUND LOWER GASTROINTESTIONAL TRACT (GI) N/A 10/30/2015    Procedure: ENDOSCOPIC ULTRASOUND LOWER GASTROINTESTIONAL TRACT (GI);  Surgeon: Daniel Jean-Baptiste MD;  [facility]     EYE SURGERY  12/4/17     INSERT PORT VASCULAR ACCESS Right 12/19/2018    Procedure: Chest Port Placement - right;  Surgeon: Stuart Chavez PA-C;  [facility]     IR CHEST PORT PLACEMENT > 5 YRS OF AGE  12/19/2018     IR PORT REMOVAL RIGHT  6/26/2019     LAPAROSCOPIC CHOLECYSTECTOMY WITH CHOLANGIOGRAMS N/A 11/1/2015    Procedure: LAPAROSCOPIC CHOLECYSTECTOMY WITH CHOLANGIOGRAMS;  Surgeon: Tonie Warren MD;  [facility]     REMOVE PORT VASCULAR ACCESS Right 6/26/2019    Procedure: Right Port Removal;  Surgeon: Froilan Irizarry PA-C;  [facility]     SURGICAL HISTORY OF -   1996    malignant melanoma     SURGICAL HISTORY OF -   1968    thyroid nodule     SURGICAL HISTORY OF -       D & C     ZZC NONSPECIFIC PROCEDURE  2005    colonoscopy polyp repeat 2010     alendronate (FOSAMAX) 70 MG tablet  amoxicillin (AMOXIL) 500 MG tablet  bisacodyl (DULCOLAX) 5 MG EC tablet  diltiazem ER (DILT-XR) 180 MG 24 hr capsule  ferrous sulfate 325  (65 Fe) MG TBEC EC tablet  furosemide (LASIX) 20 MG tablet  irbesartan (AVAPRO) 300 MG tablet  lovastatin (MEVACOR) 40 MG tablet  methimazole (TAPAZOLE) 5 MG tablet  Omega-3 Fatty Acids (FISH OIL) 500 MG CAPS  omeprazole (PRILOSEC) 20 MG DR capsule  ondansetron (ZOFRAN-ODT) 4 MG ODT tab  propranolol ER (INDERAL LA) 60 MG 24 hr capsule  rivaroxaban ANTICOAGULANT (XARELTO) 10 MG TABS tablet  rOPINIRole (REQUIP) 0.25 MG tablet  sertraline (ZOLOFT) 100 MG tablet  traZODone (DESYREL) 50 MG tablet      Allergies   Allergen Reactions     Codeine      Family History  Family History   Problem Relation Age of Onset     Cancer Father         dec - esophageal and laryngeal     Heart Disease Mother      Respiratory Mother         dec     Breast Cancer Daughter      Other Cancer Daughter      Thyroid Disease Daughter      Asthma Daughter      Hyperlipidemia Son      Diabetes Son      Anesthesia Reaction No family hx of      Deep Vein Thrombosis (DVT) No family hx of      Social History   Social History     Tobacco Use     Smoking status: Never Smoker     Smokeless tobacco: Never Used   Vaping Use     Vaping Use: Never used   Substance Use Topics     Alcohol use: No     Alcohol/week: 0.0 standard drinks     Comment: rare     Drug use: No      Past medical history, past surgical history, medications, allergies, family history, and social history were reviewed with the patient. No additional pertinent items.     I have reviewed the Medications, Allergies, Past Medical and Surgical History, and Social History in the Epic system.    Review of Systems   Constitutional: Negative for chills and fever.   HENT: Negative for congestion.    Eyes: Negative for redness.   Respiratory: Negative for shortness of breath.    Cardiovascular: Negative for chest pain.   Gastrointestinal: Positive for nausea ( resolved) and vomiting ( resolved). Negative for abdominal pain.   Endocrine: Negative for polydipsia and polyuria.   Genitourinary: Negative  "for difficulty urinating.   Musculoskeletal: Negative for arthralgias, neck pain and neck stiffness.   Skin: Negative for color change.   Allergic/Immunologic: Negative for immunocompromised state.   Neurological: Negative for headaches.   Hematological: Negative for adenopathy. Does not bruise/bleed easily.   Psychiatric/Behavioral: Negative for confusion.   All other systems reviewed and are negative.    A complete review of systems was performed with pertinent positives and negatives noted in the HPI, and all other systems negative.    Physical Exam   BP: (!) 146/76  Pulse: 81  Temp: 98.2  F (36.8  C)  Resp: 15  Height: 144.8 cm (4' 9\")  Weight: 76.7 kg (169 lb)  SpO2: 95 %      Physical Exam  Vitals and nursing note reviewed.   Constitutional:       General: She is not in acute distress.     Appearance: Normal appearance. She is not ill-appearing, toxic-appearing or diaphoretic.   HENT:      Head: Normocephalic and atraumatic.      Mouth/Throat:      Pharynx: No oropharyngeal exudate.   Eyes:      General: No scleral icterus.     Extraocular Movements: Extraocular movements intact.      Conjunctiva/sclera: Conjunctivae normal.   Cardiovascular:      Rate and Rhythm: Normal rate.      Pulses: Normal pulses.      Heart sounds: Normal heart sounds.   Pulmonary:      Effort: Pulmonary effort is normal. No respiratory distress.      Breath sounds: Normal breath sounds.   Abdominal:      General: Abdomen is flat. There is no distension.      Palpations: Abdomen is soft. There is no mass.      Tenderness: There is no abdominal tenderness. There is no right CVA tenderness, left CVA tenderness, guarding or rebound.      Hernia: No hernia is present.   Genitourinary:     Rectum: Normal. Guaiac result negative.   Musculoskeletal:         General: No tenderness. Normal range of motion.      Cervical back: Normal range of motion and neck supple.   Skin:     General: Skin is warm.      Coloration: Skin is not pale.      " Findings: No rash.   Neurological:      General: No focal deficit present.      Mental Status: She is alert and oriented to person, place, and time.      Cranial Nerves: No cranial nerve deficit.      Coordination: Coordination normal.   Psychiatric:         Mood and Affect: Mood normal.         Behavior: Behavior normal.         Thought Content: Thought content normal.         Judgment: Judgment normal.         ED Course     At 1:43 PM the patient was seen and examined by Jojo Moore MD in Room ED22.        Procedures              EKG Interpretation:      Interpreted by Jojo Moore MD  Time reviewed: 1:46 PM  Symptoms at time of EKG: vomiting   Rhythm: atrial fibrillation   Rate: 110  Axis: LAD  Ectopy: none  Conduction: normal  ST Segments/ T Waves: No ST-T wave changes  Q Waves: none  Comparison to prior: Unchanged from old EKG done on October 27, 2021    Clinical Impression: Atrial fibrillation without acute ischemic changes               Results for orders placed or performed during the hospital encounter of 04/21/22 (from the past 24 hour(s))   EKG 12-lead, tracing only   Result Value Ref Range    Systolic Blood Pressure  mmHg    Diastolic Blood Pressure  mmHg    Ventricular Rate 110 BPM    Atrial Rate 117 BPM    NE Interval  ms    QRS Duration 102 ms     ms    QTc 427 ms    P Axis  degrees    R AXIS -67 degrees    T Axis 51 degrees    Interpretation ECG       Atrial fibrillation with rapid ventricular response with premature ventricular or aberrantly conducted complexes  Left axis deviation  Anterior infarct , age undetermined  Abnormal ECG     CBC with platelets differential    Narrative    The following orders were created for panel order CBC with platelets differential.  Procedure                               Abnormality         Status                     ---------                               -----------         ------                     CBC with platelets and d...[[id number]]                       Final result                 Please view results for these tests on the individual orders.   INR   Result Value Ref Range    INR 1.13 0.85 - 1.15   ABO/Rh type and screen    Narrative    The following orders were created for panel order ABO/Rh type and screen.  Procedure                               Abnormality         Status                     ---------                               -----------         ------                     Adult Type and Screen[[id number]]                            Final result                 Please view results for these tests on the individual orders.   Comprehensive metabolic panel   Result Value Ref Range    Sodium 139 133 - 144 mmol/L    Potassium 3.8 3.4 - 5.3 mmol/L    Chloride 103 94 - 109 mmol/L    Carbon Dioxide (CO2) 29 20 - 32 mmol/L    Anion Gap 7 3 - 14 mmol/L    Urea Nitrogen 16 7 - 30 mg/dL    Creatinine 0.83 0.52 - 1.04 mg/dL    Calcium 9.3 8.5 - 10.1 mg/dL    Glucose 90 70 - 99 mg/dL    Alkaline Phosphatase 88 40 - 150 U/L    AST 23 0 - 45 U/L    ALT 27 0 - 50 U/L    Protein Total 7.5 6.8 - 8.8 g/dL    Albumin 3.6 3.4 - 5.0 g/dL    Bilirubin Total 0.4 0.2 - 1.3 mg/dL    GFR Estimate 67 >60 mL/min/1.73m2   CBC with platelets and differential   Result Value Ref Range    WBC Count 7.9 4.0 - 11.0 10e3/uL    RBC Count 4.14 3.80 - 5.20 10e6/uL    Hemoglobin 12.3 11.7 - 15.7 g/dL    Hematocrit 38.8 35.0 - 47.0 %    MCV 94 78 - 100 fL    MCH 29.7 26.5 - 33.0 pg    MCHC 31.7 31.5 - 36.5 g/dL    RDW 14.1 10.0 - 15.0 %    Platelet Count 230 150 - 450 10e3/uL    % Neutrophils 68 %    % Lymphocytes 22 %    % Monocytes 8 %    % Eosinophils 1 %    % Basophils 1 %    % Immature Granulocytes 0 %    NRBCs per 100 WBC 0 <1 /100    Absolute Neutrophils 5.3 1.6 - 8.3 10e3/uL    Absolute Lymphocytes 1.7 0.8 - 5.3 10e3/uL    Absolute Monocytes 0.6 0.0 - 1.3 10e3/uL    Absolute Eosinophils 0.1 0.0 - 0.7 10e3/uL    Absolute Basophils 0.1 0.0 - 0.2 10e3/uL    Absolute Immature Granulocytes  0.0 <=0.4 10e3/uL    Absolute NRBCs 0.0 10e3/uL   Adult Type and Screen   Result Value Ref Range    ABO/RH(D) O POS     Antibody Screen Negative Negative    SPECIMEN EXPIRATION DATE [id number]      *Note: Due to a large number of results and/or encounters for the requested time period, some results have not been displayed. A complete set of results can be found in Results Review.     Medications - No data to display          Assessments & Plan (with Medical Decision Making)   Dimple Oviedo is a 88 year old female presenting with 1 episode of dark liquid vomitus.  Differential diagnosis: Upper GI bleed, PUD, dehydration, electrode normalities, ACS.    After thorough history and physical exam patient presenting no acute distress.  Currently she is asymptomatic and at her baseline.  Allfen laboratory studies and EKG for further diagnostic evaluation.  She agrees with the plan.    Laboratory studies returned with no leukocytosis, WBC is normal at 7900.  There is no anemia, hemoglobin is normal at 12.3.  Electrolytes show no evidence of dehydration, creatinine is normal at 0.83.  INR is normal at 1.13.  Guaiac test stool guaiac test was negative.  It is unclear to me what etiology of patient's symptom is, however, it certainly could be an episode of upper GI bleed.  Given her hemodynamic stability and normal laboratory studies believe that she is stable for discharge at this point with outpatient follow-up and GI referral.  She agrees with the plan and she also agrees to return to the Emergency Department if this if her symptoms recur.    This part of the medical record was transcribed by Rinku Sanchez Medical Scribe.     I have reviewed the nursing notes.    I have reviewed the findings, diagnosis, plan and need for follow up with the patient.    Discharge Medication List as of 4/21/2022  3:26 PM          Final diagnoses:   Dark emesis       Rinku INFANTE am serving as a trained medical scribe to document  services personally performed by Jojo Moore MD, based on the provider's statements to me.      I, Jojo Moore MD, was physically present and have reviewed and verified the accuracy of this note documented by Rinku Sanchez.     Jojo Moore MD  4/21/2022   [facility] EMERGENCY DEPARTMENT     Jojo Moore MD  04/21/22 2240

## 2024-03-12 ENCOUNTER — DOCUMENTATION ONLY (OUTPATIENT)
Dept: ONCOLOGY | Facility: CLINIC | Age: 89
End: 2024-03-12
Payer: MEDICARE

## 2024-03-13 DIAGNOSIS — E04.2 NONTOXIC MULTINODULAR GOITER: ICD-10-CM

## 2024-03-13 DIAGNOSIS — I10 HYPERTENSION GOAL BP (BLOOD PRESSURE) < 140/90: ICD-10-CM

## 2024-03-13 RX ORDER — PROPRANOLOL HCL 60 MG
CAPSULE, EXTENDED RELEASE 24HR ORAL
Qty: 90 CAPSULE | Refills: 2 | Status: SHIPPED | OUTPATIENT
Start: 2024-03-13

## 2024-03-15 ENCOUNTER — ANCILLARY PROCEDURE (OUTPATIENT)
Dept: CT IMAGING | Facility: CLINIC | Age: 89
End: 2024-03-15
Attending: INTERNAL MEDICINE
Payer: MEDICARE

## 2024-03-15 ENCOUNTER — LAB (OUTPATIENT)
Dept: LAB | Facility: CLINIC | Age: 89
End: 2024-03-15
Attending: INTERNAL MEDICINE
Payer: MEDICARE

## 2024-03-15 DIAGNOSIS — C66.1 UROTHELIAL CARCINOMA OF RIGHT DISTAL URETER (H): ICD-10-CM

## 2024-03-15 DIAGNOSIS — R91.8 PULMONARY NODULES: ICD-10-CM

## 2024-03-15 DIAGNOSIS — I48.20 CHRONIC ATRIAL FIBRILLATION (H): ICD-10-CM

## 2024-03-15 DIAGNOSIS — K92.0 DARK EMESIS: ICD-10-CM

## 2024-03-15 DIAGNOSIS — E05.00 GRAVES DISEASE: ICD-10-CM

## 2024-03-15 DIAGNOSIS — K21.9 GASTROESOPHAGEAL REFLUX DISEASE, UNSPECIFIED WHETHER ESOPHAGITIS PRESENT: ICD-10-CM

## 2024-03-15 DIAGNOSIS — R11.10 VOMITING, UNSPECIFIED VOMITING TYPE, UNSPECIFIED WHETHER NAUSEA PRESENT: ICD-10-CM

## 2024-03-15 DIAGNOSIS — C68.9 UROTHELIAL CARCINOMA (H): ICD-10-CM

## 2024-03-15 LAB
CREAT BLD-MCNC: 1.1 MG/DL (ref 0.5–1)
EGFRCR SERPLBLD CKD-EPI 2021: 47 ML/MIN/1.73M2
ERYTHROCYTE [DISTWIDTH] IN BLOOD BY AUTOMATED COUNT: 13.9 % (ref 10–15)
HCT VFR BLD AUTO: 40.6 % (ref 35–47)
HGB BLD-MCNC: 12.9 G/DL (ref 11.7–15.7)
MCH RBC QN AUTO: 29.6 PG (ref 26.5–33)
MCHC RBC AUTO-ENTMCNC: 31.8 G/DL (ref 31.5–36.5)
MCV RBC AUTO: 93 FL (ref 78–100)
PLATELET # BLD AUTO: 263 10E3/UL (ref 150–450)
RBC # BLD AUTO: 4.36 10E6/UL (ref 3.8–5.2)
WBC # BLD AUTO: 10 10E3/UL (ref 4–11)

## 2024-03-15 PROCEDURE — 71260 CT THORAX DX C+: CPT | Mod: MG | Performed by: RADIOLOGY

## 2024-03-15 PROCEDURE — 36415 COLL VENOUS BLD VENIPUNCTURE: CPT | Performed by: PATHOLOGY

## 2024-03-15 PROCEDURE — 74177 CT ABD & PELVIS W/CONTRAST: CPT | Mod: MG | Performed by: RADIOLOGY

## 2024-03-15 PROCEDURE — G1010 CDSM STANSON: HCPCS | Performed by: RADIOLOGY

## 2024-03-15 PROCEDURE — 85027 COMPLETE CBC AUTOMATED: CPT | Performed by: PATHOLOGY

## 2024-03-15 PROCEDURE — 82565 ASSAY OF CREATININE: CPT | Performed by: PATHOLOGY

## 2024-03-15 RX ORDER — IOPAMIDOL 755 MG/ML
89 INJECTION, SOLUTION INTRAVASCULAR ONCE
Status: COMPLETED | OUTPATIENT
Start: 2024-03-15 | End: 2024-03-15

## 2024-03-15 RX ADMIN — IOPAMIDOL 89 ML: 755 INJECTION, SOLUTION INTRAVASCULAR at 11:47

## 2024-03-15 NOTE — DISCHARGE INSTRUCTIONS

## 2024-03-21 ENCOUNTER — ONCOLOGY VISIT (OUTPATIENT)
Dept: ONCOLOGY | Facility: CLINIC | Age: 89
End: 2024-03-21
Attending: INTERNAL MEDICINE
Payer: MEDICARE

## 2024-03-21 VITALS
TEMPERATURE: 98.5 F | SYSTOLIC BLOOD PRESSURE: 130 MMHG | BODY MASS INDEX: 36.45 KG/M2 | HEART RATE: 72 BPM | DIASTOLIC BLOOD PRESSURE: 74 MMHG | WEIGHT: 176 LBS | RESPIRATION RATE: 16 BRPM | OXYGEN SATURATION: 96 %

## 2024-03-21 DIAGNOSIS — C66.1 UROTHELIAL CARCINOMA OF RIGHT DISTAL URETER (H): ICD-10-CM

## 2024-03-21 DIAGNOSIS — C68.9 UROTHELIAL CARCINOMA (H): Primary | ICD-10-CM

## 2024-03-21 PROCEDURE — 88120 CYTP URNE 3-5 PROBES EA SPEC: CPT | Mod: TC

## 2024-03-21 PROCEDURE — 88120 CYTP URNE 3-5 PROBES EA SPEC: CPT | Mod: 26 | Performed by: PATHOLOGY

## 2024-03-21 PROCEDURE — 88112 CYTOPATH CELL ENHANCE TECH: CPT | Mod: 26 | Performed by: PATHOLOGY

## 2024-03-21 PROCEDURE — 88112 CYTOPATH CELL ENHANCE TECH: CPT | Mod: TC

## 2024-03-21 PROCEDURE — G0463 HOSPITAL OUTPT CLINIC VISIT: HCPCS

## 2024-03-21 PROCEDURE — 99215 OFFICE O/P EST HI 40 MIN: CPT

## 2024-03-21 ASSESSMENT — PAIN SCALES - GENERAL: PAINLEVEL: NO PAIN (0)

## 2024-03-21 NOTE — PROGRESS NOTES
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    HISTORY OF PRESENT ILLNESS:  Ms. 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 Her oncologic history is as under.    Ms. Oviedo is doing well. She denies any complains suggestive of recurrent disease. She had covid at the beginning of the month with a cough. She is feeling better now. She has no new pains in her body. No hematuria. No fevers or chills. No chest pain or shortness of breath.     She is accompanied by her son Issa and Daughter via telephone.        ONCOLOGIC HISTORY:  1. 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   - Jan 2018 to September 2018- Persistent gross hematuria and underwent multiple procedures.    - 2/19/18 and 4/3/18- cystoscopies with bladder biopsies  which were negative for bladder tumor  - 1/17/18, 3/8/18, 7/26/18, 9/10/18- Multiple urine cytologies have come back positive by FISH for high-grade transitional cell carcinoma  - 9/10/18- Underwent a bilateral cystoureteroscopy with biopsy and brushing that showed  right upper tract cytology suspicious for high-grade urothelial ca. Right ureter brushing negative from that day. Left upper tract negative.  - 9/10/18- CT A/P without contrast showed new small bilateral pleural effusions and interstitial pulmonary edema but no evidence of locoregional or metastatic disease.  - 9/12/18- Presented to ED with oliguria, CRESENCIO, and mild hydronephrosis bilaterally on CT scan and underwent bilateral ureteral stent placment  - 11/13/2018- Right robotic nephroureterectomy, excision of sandip-caval mass and LN excision  "by Stuart King. Pathology showed \"Right nephroureterectomy: Invasive high grade urothelial carcinoma measuring 3.5 cm, located in renal pelvis and invading through renal parenchyma into perinephric fat. Urothelial carcinoma in-situ present. Margins free of tumor. Ana-caval mass: Metastatic urothelial carcinoma involving one lymph node with at least 1 cm tumor deposit. Pericaval Extranodal Extension present. Five additional benign pericaval lymph nodes. Pathologic stage: pT4N1 (1 of 6 lymph nodes).\"  - 12/11/18- PET/CT whole body demonstrated significant residual FDG avid disease. For example, \"there is an FDG avid ill-defined pericaval mass immediately medial to the surgical clips measuring approximately 2.0 x 1.4 cm, with max SUV measuring up to12.2, see series 4 image 21. Additional FDG avid retrocaval and interaortocaval lymphadenopathy are noted.There is a 1.2 cm nodule in the right hemipelvis posterior lateral tothe bladder with max SUV measuring 8.3, see series 4 image 77. The bladder is incompletely distended.\" No suspicious thoracic or bone uptake.  - 12/12/18- Started adjuvant chemotherapy (carbo+gem) per POUT trial. Cycle 2 - 1/2/19. Cycle 3 - 1/23/19. Cycle 4 - 02/13/19.  - 1/22/19 - CT C/A/P with contrast compared with prior PET/CT: \"1. New well-circumscribed soft tissue pulmonary nodules of the right lower lobe and left upper lobe. Findings could be infectious, inflammatory, or represent metastatic disease. Continued attention on follow-up recommended. Postoperative changes of right nephrectomy. No evidence for local recurrence in the present study.\"  - 3/1/2019- CT C/A/P with contrast - \"1. Bilateral pulmonary nodules measuring up to 4 mm in the right lower lobe, previously measuring 8 mm. No new or enlarging pulmonary nodules. 2. No convincing evidence for metastatic disease in the abdomen, pelvis and bones.\"  - 6/3/2019- CT C/A/P with contrast - \"1. Surgical changes of right nephrectomy " "without evidence of residual or recurrent disease on this noncontrast exam.  Consider contrast enhanced examination on follow-up. 2. No evidence of metastatic disease in the abdomen, or pelvis on noncontrast CT.  Scattered tiny pulmonary nodules in the chest are not significantly changed.  Previously described prominent right lower lobe pulmonary nodule (previously measuring up to 8 mm) is no longer visualized. 3. Stable bilateral pulmonary nodules measuring maximally 4 mm.\"  - 09/11/19: CT C/A/P without contrast - \"1. Stable exam without evidence convincing for new disease. 2. Stable pulmonary nodules. 3. 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 4. Stable heterogeneous enlargement of the thyroid with underlying nodules suggested.\"  - 12/4/19: CT C/A/P without contrast - \"No acute change since prior exam. No evidence of recurrent or metastatic disease. Tiny lung nodules are stable. Prior right nephrectomy. Left renal artery aneurysm is stable.\"  - 04/08/20, 7/27/20: CT C/A/P with contrast - GABRIELLE.  - 01/12/21: CT C/A/P with contrast - \"1.  Slight increased size of a 6 mm left upper lobe nodule and new 4 mm linear opacity along the right major fissure. These are indeterminate but could represent progression of metastatic disease. 2.  Slight increased size of mildly enlarged mediastinal and hilar lymph nodes. 3.  Mild pulmonary edema. 4.  No recurrent or metastatic disease in the abdomen and pelvis. 5.  Mild stranding around the urinary bladder dome may be related to cystitis. Punctate focus of gas within the urinary bladder either secondary to infection or instrumentation. 6.  Enlarged multinodular thyroid gland.\"    2. Non-muscle invasive bladder cancer:  - 10/3/19: Cystoscopy showed a small area of erythema on retroflexion, possibly pre-existing or due to retroflexion of the scope. Urine cytology from that time showed cells suspicious for malignancy.   - 1/13/20: Bladder biopsy showed high grade " urothelial carcinoma in-situ  - 2/12/20-3/18/20: Intravesical BCG therapy  - 7/24/20 and last cystoscopy was on the same day. It was negative. However, urine cytology was positive for high-grade urothelial carcinoma.   - 11/25/20-12/10/2020: 3 weekly doses of intravesical BCG 50mg.   - 12/10/20: Cytology suspicious and FISH urovysion positive for TCC.    REVIEW OF SYSTEMS: 14 point ROS negative other than the symptoms noted above in the HPI.    PAST MEDICAL AND SURGICAL HISTORY:   Past Medical History:   Diagnosis Date    CRESENCIO (acute kidney injury) (H24) 9/11/2018    Arthritis     Asthma 2/9/2022    Calculus of kidney     Chemotherapy-induced neutropenia  (H24) 3/6/2019    Congestive heart failure (H)     Esophageal reflux     GERD (gastroesophageal reflux disease)     Hyperlipidemia LDL goal <130 5/9/2010    Malignant melanoma of skin of trunk, except scrotum (H)     Nonspecific abnormal finding     has living will 2004 -     Nontoxic multinodular goiter     no further eval /tx rec per pt    Osteopenia     Other psoriasis     Personal history of colonic polyps     PMR (polymyalgia rheumatica) (H24)     Restless legs syndrome 7/11/2018    Stress-induced cardiomyopathy     Undiagnosed cardiac murmurs     Unspecified constipation     Unspecified essential hypertension     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     Past Surgical History:   Procedure Laterality Date    ARTHROPLASTY KNEE Right 11/9/2020    Procedure: ARTHROPLASTY, KNEE, TOTAL, RIGHT;  Surgeon: Edward Otero MD;  [facility]: [facility]    BIOPSY      COLONOSCOPY  2014    COMBINED CYSTOSCOPY, INSERT STENT URETER(S) Bilateral 9/12/2018    Procedure: COMBINED CYSTOSCOPY, INSERT STENT URETER(S);  Cystoscopy Bilateral ureteral Stent Placement.;  Surgeon: Justin Henry MD;  [facility]    COMBINED CYSTOSCOPY, RETROGRADES, URETEROSCOPY, INSERT STENT Bilateral 4/3/2018    Procedure: COMBINED CYSTOSCOPY, RETROGRADES, URETEROSCOPY, INSERT STENT;;  Surgeon:  Stuart King MD;  [facility]    COMBINED CYSTOSCOPY, RETROGRADES, URETEROSCOPY, INSERT STENT Bilateral 9/10/2018    Procedure: COMBINED CYSTOSCOPY, RETROGRADES, URETEROSCOPY, INSERT STENT;  Cystoscopy, Bilateral Ureteroscopy, Bladder Biopsies, Retrogram Pyelograms, Ureteral Washings and brushings, cysview;  Surgeon: Stuart King MD;  [facility]    COMBINED CYSTOSCOPY, RETROGRADES, URETEROSCOPY, INSERT STENT Left 8/26/2020    Procedure: Cystoscopy, Left Ureteral Wash, Left ureteral Retrograde Pyelogram;  Surgeon: Justin Henry MD;  [facility]    CYSTOSCOPY, BIOPSY BLADDER INSTILL OPTICAL AGENT N/A 4/3/2018    Procedure: CYSTOSCOPY, BIOPSY BLADDER INSTILL OPTICAL AGENT;  Cystoscopy, Blue Light Cystoscopy, Bladder Biopsies, Bilateral Selective ureteral washings for Cytology, Bilateral Retrograde Pyelograms, Bilateral Ureteroscopy;  Surgeon: Stuart King MD;  [facility]    CYSTOSCOPY, BIOPSY BLADDER, COMBINED N/A 2/19/2018    Procedure: COMBINED CYSTOSCOPY, BIOPSY BLADDER;  Cystoscopy, Bladder Biopsy;  Surgeon: Kenna La MD;  [facility]    CYSTOSCOPY, BIOPSY BLADDER, COMBINED N/A 8/26/2020    Procedure: Cystoscopy, biopsy bladder, combined;  Surgeon: Justin Henry MD;  [facility]    CYSTOSCOPY, FULGURATE BLADDER TUMOR, COMBINED N/A 1/13/2020    Procedure: CYSTOSCOPY, WITH  bladder biopsies and fulguration;  Surgeon: Stuart King MD;  [facility]    CYSTOSCOPY, REMOVE STENT(S), COMBINED  11/13/2018    Procedure: Flexible Cystoscopy with Stent Removal;  Surgeon: Stuart King MD;  [facility]    DAVINCI LYMPHADENECTOMY N/A 11/13/2018    Procedure: Davinci Lymphadenectomy ;  Surgeon: Stuart King MD;  [facility]    DAVINCI NEPHROURETERECTOMY N/A 11/13/2018    Procedure: Right DaVinci Assisted Nephroureterectomy;  Surgeon: Stuart King MD;  [facility]     ENDOSCOPIC ULTRASOUND LOWER GASTROINTESTIONAL TRACT (GI) N/A 10/30/2015    Procedure: ENDOSCOPIC ULTRASOUND LOWER GASTROINTESTIONAL TRACT (GI);  Surgeon: Daniel Jean-Baptiste MD;  [facility]    ESOPHAGOSCOPY, GASTROSCOPY, DUODENOSCOPY (EGD), COMBINED N/A 11/29/2023    Procedure: Esophagoscopy, gastroscopy, duodenoscopy (EGD), combined;  Surgeon: Justin Peñaloza MD;  [facility]    EYE SURGERY  12/4/17    INSERT PORT VASCULAR ACCESS Right 12/19/2018    Procedure: Chest Port Placement - right;  Surgeon: Stuart Chavez PA-C;  [facility]    IR CHEST PORT PLACEMENT > 5 YRS OF AGE  12/19/2018    IR PORT REMOVAL RIGHT  6/26/2019    LAPAROSCOPIC CHOLECYSTECTOMY WITH CHOLANGIOGRAMS N/A 11/1/2015    Procedure: LAPAROSCOPIC CHOLECYSTECTOMY WITH CHOLANGIOGRAMS;  Surgeon: Tonie Warren MD;  [facility]    REMOVE PORT VASCULAR ACCESS Right 6/26/2019    Procedure: Right Port Removal;  Surgeon: Froilan Irizarry PA-C;  [facility]    SURGICAL HISTORY OF -   1996    malignant melanoma    SURGICAL HISTORY OF -   1968    thyroid nodule    SURGICAL HISTORY OF -       D & C    ZZC NONSPECIFIC PROCEDURE  2005    colonoscopy polyp repeat 2010     SOCIAL HISTORY:   Social History     Tobacco Use    Smoking status: Never     Passive exposure: Never    Smokeless tobacco: Never   Vaping Use    Vaping Use: Never used   Substance Use Topics    Alcohol use: No     Alcohol/week: 0.0 standard drinks of alcohol     Comment: rare    Drug use: No     FAMILY HISTORY:   Family History   Problem Relation Age of Onset    Cancer Father         dec - esophageal and laryngeal    Heart Disease Mother     Respiratory Mother         dec    Breast Cancer Daughter     Other Cancer Daughter     Thyroid Disease Daughter     Asthma Daughter     Hyperlipidemia Son     Diabetes Son     Anesthesia Reaction No family hx of     Deep Vein Thrombosis (DVT) No family hx of      ALLERGIES:   No Known Allergies  CURRENT  MEDICATIONS:   Current Outpatient Medications:     alendronate (FOSAMAX) 70 MG tablet, TAKE 1 TABLET EVERY 7 DAYS AT LEAST 60 MINUTES BEFORE BREAKFAST AS DIRECTED., Disp: 12 tablet, Rfl: 2    diltiazem ER (DILT-XR) 180 MG 24 hr capsule, Take 1 capsule (180 mg) by mouth daily, Disp: 90 capsule, Rfl: 3    ferrous sulfate 325 (65 Fe) MG TBEC EC tablet, Take 325 mg by mouth every morning , Disp: , Rfl:     furosemide (LASIX) 20 MG tablet, TAKE 1 TABLET TWICE DAILY IN THE MORNING  AND  AFTER  LUNCH, Disp: 180 tablet, Rfl: 3    irbesartan (AVAPRO) 300 MG tablet, Take 1 tablet (300 mg) by mouth daily, Disp: 90 tablet, Rfl: 3    lovastatin (MEVACOR) 40 MG tablet, TAKE 1 TABLET AT BEDTIME (SCHEDULE ANNUAL VISIT, NEED FASTING LABS), Disp: 90 tablet, Rfl: 0    methimazole (TAPAZOLE) 5 MG tablet, TAKE 1 TABLET ALTERNATING WITH 1/2 TABLET EVERY OTHER DAY, Disp: 66 tablet, Rfl: 3    Omega-3 Fatty Acids (FISH OIL) 500 MG CAPS, Take 1 capsule by mouth every morning , Disp: , Rfl:     omeprazole (PRILOSEC) 20 MG DR capsule, Take 1 capsule (20 mg) by mouth daily, Disp: 90 capsule, Rfl: 3    propranolol ER (INDERAL LA) 60 MG 24 hr capsule, TAKE 1 CAPSULE EVERY DAY, Disp: 90 capsule, Rfl: 2    RESTASIS 0.05 % ophthalmic emulsion, , Disp: , Rfl:     rivaroxaban ANTICOAGULANT (XARELTO) 15 MG TABS tablet, Take 1 tablet (15 mg) by mouth daily (with dinner), Disp: 90 tablet, Rfl: 3    rOPINIRole (REQUIP) 0.25 MG tablet, TAKE 1 TABLET IN THE AFTERNOON AND TAKE 2 TABLETS EVERY NIGHT, Disp: 270 tablet, Rfl: 3    sertraline (ZOLOFT) 100 MG tablet, Take 1 tablet (100 mg) by mouth every morning, Disp: 90 tablet, Rfl: 3    traZODone (DESYREL) 50 MG tablet, TAKE 1/2 TABLET AT BEDTIME, Disp: 45 tablet, Rfl: 3    Current Facility-Administered Medications:     lidocaine (XYLOCAINE) 2 % external gel, , Urethral, Once, Justin Henry MD    PHYSICAL EXAMINATION:  General: No acute distress  HEENT: Sclera anicteric. Oral mucosa pink and moist.  No  mucositis or thrush  Lymph: No lymphadenopathy in neck  Heart: Regular, rate, and rhythm  Lungs: Clear to ascultation bilaterally  Abdomen: Positive bowel sounds. Soft, non-distended, non-tender. No organomegaly or mass.   Extremities: no lower extremity edema  Neuro: Cranial nerves grossly intact  Rash: none  Vascular access: port    LABORATORY DATA:   Most Recent 3 CBC's:  Recent Labs   Lab Test 03/15/24  1232 12/18/23  1548 12/07/23  1613 11/14/23  0022 11/13/23 1952 11/08/23  1431 07/18/23  1158   WBC 10.0 7.6 8.4 8.0 7.5   < > 6.4   HGB 12.9 12.7 12.8 12.3 13.0   < > 12.9   MCV 93 98 96 98 98   < > 97    214 220 191 221   < > 209   ANEUTAUTO  --   --   --  5.4 5.2  --  4.5    < > = values in this interval not displayed.     Most Recent 3 BMP's:  Recent Labs   Lab Test 03/15/24  1143 12/18/23  1548 12/07/23  1613 11/14/23  0022 11/13/23 1952 11/08/23  1431   NA  --  139 138 142 146* 138   POTASSIUM  --  4.6 4.7 4.1 5.3 4.5   CHLORIDE  --  99 100 103 95* 100   CO2  --  29 29 26 22 27   BUN  --  19.7 16.8 13.9 15.0 20.2   CR 1.1* 1.04* 0.93 0.87 0.84 1.01*   ANIONGAP  --  11 9 13 29* 11   SHREYA  --  9.4 9.4 9.1 9.1 9.6   GLC  --  101* 103* 104* 93 94   PROTTOTAL  --   --   --  7.0 7.2 7.2   ALBUMIN  --   --   --  4.1 4.0 4.2    Most Recent 3 LFT's:  Recent Labs   Lab Test 11/14/23  0022 11/13/23 1952 11/08/23  1431 07/18/23  1158 02/27/23  1306 12/09/22  1000   AST  --   --  22 22  --  27   ALT 21  --  17 11   < > 19   ALKPHOS 86  --  89 82   < > 91   BILITOTAL 0.5 0.5 0.5 0.5   < > 0.5    < > = values in this interval not displayed.    Most Recent 2 TSH and T4:  Recent Labs   Lab Test 06/22/23  1134 12/09/22  1000 09/02/21  0644 07/05/21  1347   TSH 2.39 2.62   < > 1.68   T4 0.84*  --   --  0.79    < > = values in this interval not displayed.     Urine cytology and FISH pending.     CT CAP 3/15/24:   FINDINGS:   LUNGS AND PLEURA: Pleural-based lesion measures 6 mm in width image 154 series 3, not  significantly changed from previous. Stable 4 mm nodule in the left upper lobe image 88. Additional smaller nodules are stable.     MEDIASTINUM/AXILLAE: Small hiatal hernia. No adenopathy or free fluid. Cardiomegaly. Marked thyromegaly and numerous nodules not significantly different in appearance by CT. This is better evaluated with ultrasound.     CORONARY ARTERY CALCIFICATION: Minimal     HEPATOBILIARY: Cholecystectomy. Mild prominence of the biliary system likely related to reservoir effect. No focal liver lesions.     PANCREAS: No significant mass, duct dilatation, or inflammatory change.     SPLEEN: Normal size.     ADRENAL GLANDS: No significant nodules.     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 A few nonobstructing stones measuring up to 5 mm noted on the left. No new lesions. Nephrectomy. No hydronephrosis or ureteral stone.     BOWEL: No obstruction or inflammatory change.     LYMPH NODES: No abdominal pelvic adenopathy or free fluid.     VASCULATURE: There are moderate atherosclerotic changes of the visualized aorta and its branches. There is no evidence of aortic dissection or aneurysm.     PELVIC ORGANS: No pelvic masses.     MUSCULOSKELETAL: No frankly destructive bony lesions.                                                                      IMPRESSION:  1.  Stable pleural-based lesion in the posteromedial left hemithorax.  2.  Stable appearance of the thyroid.  3.  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    I reviewed the above labs and imaging today.      ASSESSMENT AND PLAN: Ms. 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      Dimple continues to remain in good health. She has no new reported symptoms in history. We spent time  reviewing her CT CAP from 3/15/24. She has a stable pleural based lung change and a stable thyroid nodule when compared to 09/13/23 scan. There previously was a small FDG uptake in the hepatic flexure of the colon. There are no bowel lesions depicted in her scan from 3/15/24.   - Please see Dr. Fine's note from 09/2023 regarding biopsying the lesions. Overall, decision was to continue with surveillance with CT CAP every 6 months. If she has any symptoms from her lesion we could consider radiation even without a biopsy.    1. Upper tract urothelial cancer:   - She completed 4 cycles of adjuvant carboplatin plus gemcitabine chemotherapy per POUT trial.    She completed adjuvant chemotherapy in Feb 2019.  - No residual side effects or evidence of recurrence.  - Reviewed restaging CT scan 3/14/24 as above; there is no clear evidence of disease recurrence in abd/pelvis.   She continues to have pulmonary nodules which remain stable and a stable pleural nodule in left lower lobe. Surveillance as above at this time.         2. High grade urothelial carcinoma in situ:  - Bx proven carcinoma in situ in 1/2020, completed the first round of intravesical BCG treatment 2/2020-3/2020 and second in 11/2020-12/2020.  - She was initially followed by Dr. King and now is under care of Dr. Henry.   - She did not have urine cytology done will obtain today.   - per chart review of Dr. Henry's note on 2/23 she was to return in 6 months. Will request follow up with their team.     4. HERMAN:  - She follows Dr. Griffith in Cardiology for her h/o moderate AORTIC STENOSIS, CHF, and Afib with SOA and lymphedema with no cancer-related etiology evident. Appears stable today.     Return to clinic in 6 months with restaging scans with Dr. Fine.    40 minutes spent on the date of the encounter doing chart review, review of test results, interpretation of tests, patient visit, and documentation     Maral Johnson PA-C

## 2024-03-21 NOTE — Clinical Note
3/21/2024         RE: Dimple Oviedo  [address]  [facility] [id number]        Dear Colleague,    Thank you for referring your patient, Dimple Oviedo, to the [facility]. Please see a copy of my visit note below.    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    HISTORY OF PRESENT ILLNESS:  Ms. 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 Her oncologic history is as under.    Ms. Oviedo is doing well. She denies any complains suggestive of recurrent disease.     She is here with her daughter and son in law in person. They have come from [facility] for this clinic visit. Her son Geoff also joined us over the phone.      ONCOLOGIC HISTORY:  1. High-grade, muscle-invasive, transitional cell carcinoma of right renal pelvis, stage IV ([id number]):  - Dec 2017- Started having gross hematuria.   - Jan 2018 to September 2018- Persistent gross hematuria and underwent multiple procedures.    - 2/19/18 and 4/3/18- cystoscopies with bladder biopsies  which were negative for bladder tumor  - 1/17/18, 3/8/18, 7/26/18, 9/10/18- Multiple urine cytologies have come back positive by FISH for high-grade transitional cell carcinoma  - 9/10/18- Underwent a bilateral cystoureteroscopy with biopsy and brushing that showed  right upper tract cytology suspicious for high-grade urothelial ca. Right ureter brushing negative from that day. Left upper tract negative.  - 9/10/18- CT A/P without contrast showed new small bilateral pleural effusions and interstitial pulmonary edema but no evidence of locoregional or metastatic disease.  - 9/12/18- Presented to ED with oliguria, CRESENCIO, and mild hydronephrosis bilaterally  "on CT scan and underwent bilateral ureteral stent placment  - 11/13/2018- Right robotic nephroureterectomy, excision of sandip-caval mass and LN excision by Stuart King. Pathology showed \"Right nephroureterectomy: Invasive high grade urothelial carcinoma measuring 3.5 cm, located in renal pelvis and invading through renal parenchyma into perinephric fat. Urothelial carcinoma in-situ present. Margins free of tumor. Sandip-caval mass: Metastatic urothelial carcinoma involving one lymph node with at least 1 cm tumor deposit. Pericaval Extranodal Extension present. Five additional benign pericaval lymph nodes. Pathologic stage: pT4N1 (1 of 6 lymph nodes).\"  - 12/11/18- PET/CT whole body demonstrated significant residual FDG avid disease. For example, \"there is an FDG avid ill-defined pericaval mass immediately medial to the surgical clips measuring approximately 2.0 x 1.4 cm, with max SUV measuring up to12.2, see series 4 image 21. Additional FDG avid retrocaval and interaortocaval lymphadenopathy are noted.There is a 1.2 cm nodule in the right hemipelvis posterior lateral tothe bladder with max SUV measuring 8.3, see series 4 image 77. The bladder is incompletely distended.\" No suspicious thoracic or bone uptake.  - 12/12/18- Started adjuvant chemotherapy (carbo+gem) per POUT trial. Cycle 2 - 1/2/19. Cycle 3 - 1/23/19. Cycle 4 - 02/13/19.  - 1/22/19 - CT C/A/P with contrast compared with prior PET/CT: \"1. New well-circumscribed soft tissue pulmonary nodules of the right lower lobe and left upper lobe. Findings could be infectious, inflammatory, or represent metastatic disease. Continued attention on follow-up recommended. Postoperative changes of right nephrectomy. No evidence for local recurrence in the present study.\"  - 3/1/2019- CT C/A/P with contrast - \"1. Bilateral pulmonary nodules measuring up to 4 mm in the right lower lobe, previously measuring 8 mm. No new or enlarging pulmonary nodules. 2. No convincing " "evidence for metastatic disease in the abdomen, pelvis and bones.\"  - 6/3/2019- CT C/A/P with contrast - \"1. Surgical changes of right nephrectomy without evidence of residual or recurrent disease on this noncontrast exam.  Consider contrast enhanced examination on follow-up. 2. No evidence of metastatic disease in the abdomen, or pelvis on noncontrast CT.  Scattered tiny pulmonary nodules in the chest are not significantly changed.  Previously described prominent right lower lobe pulmonary nodule (previously measuring up to 8 mm) is no longer visualized. 3. Stable bilateral pulmonary nodules measuring maximally 4 mm.\"  - 09/11/19: CT C/A/P without contrast - \"1. Stable exam without evidence convincing for new disease. 2. Stable pulmonary nodules. 3. 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 4. Stable heterogeneous enlargement of the thyroid with underlying nodules suggested.\"  - 12/4/19: CT C/A/P without contrast - \"No acute change since prior exam. No evidence of recurrent or metastatic disease. Tiny lung nodules are stable. Prior right nephrectomy. Left renal artery aneurysm is stable.\"  - 04/08/20, 7/27/20: CT C/A/P with contrast - GABRIELLE.  - 01/12/21: CT C/A/P with contrast - \"1.  Slight increased size of a 6 mm left upper lobe nodule and new 4 mm linear opacity along the right major fissure. These are indeterminate but could represent progression of metastatic disease. 2.  Slight increased size of mildly enlarged mediastinal and hilar lymph nodes. 3.  Mild pulmonary edema. 4.  No recurrent or metastatic disease in the abdomen and pelvis. 5.  Mild stranding around the urinary bladder dome may be related to cystitis. Punctate focus of gas within the urinary bladder either secondary to infection or instrumentation. 6.  Enlarged multinodular thyroid gland.\"    2. Non-muscle invasive bladder cancer:  - 10/3/19: Cystoscopy showed a small area of erythema on retroflexion, possibly pre-existing or due to " retroflexion of the scope. Urine cytology from that time showed cells suspicious for malignancy.   - 1/13/20: Bladder biopsy showed high grade urothelial carcinoma in-situ  - 2/12/20-3/18/20: Intravesical BCG therapy  - 7/24/20 and last cystoscopy was on the same day. It was negative. However, urine cytology was positive for high-grade urothelial carcinoma.   - 11/25/20-12/10/2020: 3 weekly doses of intravesical BCG 50mg.   - 12/10/20: Cytology suspicious and FISH urovysion positive for TCC.    REVIEW OF SYSTEMS: 14 point ROS negative other than the symptoms noted above in the HPI.    PAST MEDICAL AND SURGICAL HISTORY:   Past Medical History:   Diagnosis Date    CRESENCIO (acute kidney injury) (H24) 9/11/2018    Arthritis     Asthma 2/9/2022    Calculus of kidney     Chemotherapy-induced neutropenia  (H24) 3/6/2019    Congestive heart failure (H)     Esophageal reflux     GERD (gastroesophageal reflux disease)     Hyperlipidemia LDL goal <130 5/9/2010    Malignant melanoma of skin of trunk, except scrotum (H)     Nonspecific abnormal finding     has living will 2004 -     Nontoxic multinodular goiter     no further eval /tx rec per pt    Osteopenia     Other psoriasis     Personal history of colonic polyps     PMR (polymyalgia rheumatica) (H24)     Restless legs syndrome 7/11/2018    Stress-induced cardiomyopathy     Undiagnosed cardiac murmurs     Unspecified constipation     Unspecified essential hypertension     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     Past Surgical History:   Procedure Laterality Date    ARTHROPLASTY KNEE Right 11/9/2020    Procedure: ARTHROPLASTY, KNEE, TOTAL, RIGHT;  Surgeon: Edward Otero MD;  [facility]    BIOPSY      COLONOSCOPY  2014    COMBINED CYSTOSCOPY, INSERT STENT URETER(S) Bilateral 9/12/2018    Procedure: COMBINED CYSTOSCOPY, INSERT STENT URETER(S);  Cystoscopy Bilateral ureteral Stent Placement.;  Surgeon: Justin Henry MD;  [facility]    COMBINED CYSTOSCOPY,  RETROGRADES, URETEROSCOPY, INSERT STENT Bilateral 4/3/2018    Procedure: COMBINED CYSTOSCOPY, RETROGRADES, URETEROSCOPY, INSERT STENT;;  Surgeon: Stuart King MD;  [facility]    COMBINED CYSTOSCOPY, RETROGRADES, URETEROSCOPY, INSERT STENT Bilateral 9/10/2018    Procedure: COMBINED CYSTOSCOPY, RETROGRADES, URETEROSCOPY, INSERT STENT;  Cystoscopy, Bilateral Ureteroscopy, Bladder Biopsies, Retrogram Pyelograms, Ureteral Washings and brushings, cysview;  Surgeon: Stuart King MD;  [facility]    COMBINED CYSTOSCOPY, RETROGRADES, URETEROSCOPY, INSERT STENT Left 8/26/2020    Procedure: Cystoscopy, Left Ureteral Wash, Left ureteral Retrograde Pyelogram;  Surgeon: Justin Henry MD;  [facility]    CYSTOSCOPY, BIOPSY BLADDER INSTILL OPTICAL AGENT N/A 4/3/2018    Procedure: CYSTOSCOPY, BIOPSY BLADDER INSTILL OPTICAL AGENT;  Cystoscopy, Blue Light Cystoscopy, Bladder Biopsies, Bilateral Selective ureteral washings for Cytology, Bilateral Retrograde Pyelograms, Bilateral Ureteroscopy;  Surgeon: Stuart King MD;  [facility]    CYSTOSCOPY, BIOPSY BLADDER, COMBINED N/A 2/19/2018    Procedure: COMBINED CYSTOSCOPY, BIOPSY BLADDER;  Cystoscopy, Bladder Biopsy;  Surgeon: Kenna La MD;  [facility]    CYSTOSCOPY, BIOPSY BLADDER, COMBINED N/A 8/26/2020    Procedure: Cystoscopy, biopsy bladder, combined;  Surgeon: Justin Henry MD;  [facility]    CYSTOSCOPY, FULGURATE BLADDER TUMOR, COMBINED N/A 1/13/2020    Procedure: CYSTOSCOPY, WITH  bladder biopsies and fulguration;  Surgeon: Stuart King MD;  [facility]    CYSTOSCOPY, REMOVE STENT(S), COMBINED  11/13/2018    Procedure: Flexible Cystoscopy with Stent Removal;  Surgeon: Stuart King MD;  [facility]    DAVINCI LYMPHADENECTOMY N/A 11/13/2018    Procedure: Davinci Lymphadenectomy ;  Surgeon: Stuart King MD;  [facility]    DAVINCI  NEPHROURETERECTOMY N/A 11/13/2018    Procedure: Right DaVinci Assisted Nephroureterectomy;  Surgeon: Stuart King MD;  [facility]    ENDOSCOPIC ULTRASOUND LOWER GASTROINTESTIONAL TRACT (GI) N/A 10/30/2015    Procedure: ENDOSCOPIC ULTRASOUND LOWER GASTROINTESTIONAL TRACT (GI);  Surgeon: Daniel Jean-Baptiste MD;  [facility]    ESOPHAGOSCOPY, GASTROSCOPY, DUODENOSCOPY (EGD), COMBINED N/A 11/29/2023    Procedure: Esophagoscopy, gastroscopy, duodenoscopy (EGD), combined;  Surgeon: Justin Peñaloza MD;  [facility]    EYE SURGERY  12/4/17    INSERT PORT VASCULAR ACCESS Right 12/19/2018    Procedure: Chest Port Placement - right;  Surgeon: Stuart Chavez PA-C;  [facility]    IR CHEST PORT PLACEMENT > 5 YRS OF AGE  12/19/2018    IR PORT REMOVAL RIGHT  6/26/2019    LAPAROSCOPIC CHOLECYSTECTOMY WITH CHOLANGIOGRAMS N/A 11/1/2015    Procedure: LAPAROSCOPIC CHOLECYSTECTOMY WITH CHOLANGIOGRAMS;  Surgeon: Tonie Warren MD;  [facility]    REMOVE PORT VASCULAR ACCESS Right 6/26/2019    Procedure: Right Port Removal;  Surgeon: Froilan Irizarry PA-C;  [facility]    SURGICAL HISTORY OF -   1996    malignant melanoma    SURGICAL HISTORY OF -   1968    thyroid nodule    SURGICAL HISTORY OF -       D & C    Z NONSPECIFIC PROCEDURE  2005    colonoscopy polyp repeat 2010     SOCIAL HISTORY:   Social History     Tobacco Use    Smoking status: Never     Passive exposure: Never    Smokeless tobacco: Never   Vaping Use    Vaping Use: Never used   Substance Use Topics    Alcohol use: No     Alcohol/week: 0.0 standard drinks of alcohol     Comment: rare    Drug use: No     FAMILY HISTORY:   Family History   Problem Relation Age of Onset    Cancer Father         dec - esophageal and laryngeal    Heart Disease Mother     Respiratory Mother         dec    Breast Cancer Daughter     Other Cancer Daughter     Thyroid Disease Daughter     Asthma Daughter     Hyperlipidemia Son      Diabetes Son     Anesthesia Reaction No family hx of     Deep Vein Thrombosis (DVT) No family hx of      ALLERGIES:   No Known Allergies  CURRENT MEDICATIONS:   Current Outpatient Medications:     alendronate (FOSAMAX) 70 MG tablet, TAKE 1 TABLET EVERY 7 DAYS AT LEAST 60 MINUTES BEFORE BREAKFAST AS DIRECTED., Disp: 12 tablet, Rfl: 2    diltiazem ER (DILT-XR) 180 MG 24 hr capsule, Take 1 capsule (180 mg) by mouth daily, Disp: 90 capsule, Rfl: 3    ferrous sulfate 325 (65 Fe) MG TBEC EC tablet, Take 325 mg by mouth every morning , Disp: , Rfl:     furosemide (LASIX) 20 MG tablet, TAKE 1 TABLET TWICE DAILY IN THE MORNING  AND  AFTER  LUNCH, Disp: 180 tablet, Rfl: 3    irbesartan (AVAPRO) 300 MG tablet, Take 1 tablet (300 mg) by mouth daily, Disp: 90 tablet, Rfl: 3    lovastatin (MEVACOR) 40 MG tablet, TAKE 1 TABLET AT BEDTIME (SCHEDULE ANNUAL VISIT, NEED FASTING LABS), Disp: 90 tablet, Rfl: 0    methimazole (TAPAZOLE) 5 MG tablet, TAKE 1 TABLET ALTERNATING WITH 1/2 TABLET EVERY OTHER DAY, Disp: 66 tablet, Rfl: 3    Omega-3 Fatty Acids (FISH OIL) 500 MG CAPS, Take 1 capsule by mouth every morning , Disp: , Rfl:     omeprazole (PRILOSEC) 20 MG DR capsule, Take 1 capsule (20 mg) by mouth daily, Disp: 90 capsule, Rfl: 3    propranolol ER (INDERAL LA) 60 MG 24 hr capsule, TAKE 1 CAPSULE EVERY DAY, Disp: 90 capsule, Rfl: 2    RESTASIS 0.05 % ophthalmic emulsion, , Disp: , Rfl:     rivaroxaban ANTICOAGULANT (XARELTO) 15 MG TABS tablet, Take 1 tablet (15 mg) by mouth daily (with dinner), Disp: 90 tablet, Rfl: 3    rOPINIRole (REQUIP) 0.25 MG tablet, TAKE 1 TABLET IN THE AFTERNOON AND TAKE 2 TABLETS EVERY NIGHT, Disp: 270 tablet, Rfl: 3    sertraline (ZOLOFT) 100 MG tablet, Take 1 tablet (100 mg) by mouth every morning, Disp: 90 tablet, Rfl: 3    traZODone (DESYREL) 50 MG tablet, TAKE 1/2 TABLET AT BEDTIME, Disp: 45 tablet, Rfl: 3    Current Facility-Administered Medications:     lidocaine (XYLOCAINE) 2 % external gel, ,  Urethral, Anton, Justin Henry MD    PHYSICAL EXAMINATION:  Deferred. Phone visit due to COVID.    LABORATORY DATA:   Recent Labs   Lab Test 09/13/23  1233 07/18/23  1158 06/12/23  1332 02/27/23  1306 12/09/22  1029 12/09/22  1000 10/08/22  1340 09/12/22  1036   NA  --  137  --  138  --  139 133* 135   POTASSIUM  --  4.7  --  4.8  --  4.4 4.0 4.3   CHLORIDE  --  100  --  100  --  101 96* 102   CO2  --  28  --  27  --  28 26 27   ANIONGAP  --  9  --  11  --  10 11 6   BUN  --  19.0  --  18.5  --  18.1 19.0 21   CR 1.1* 1.05* 0.9 0.96* 1.0 1.05* 0.91 0.99   GLC  --  144*  --  102*  --  132* 104* 116*   SHREYA  --  9.7*  --  9.8  --  9.4 9.6 9.2     Recent Labs   Lab Test 09/02/21  0644 09/01/21  1505 02/03/19  2102 12/12/18  1050 01/16/18  1247 01/16/18  0646 12/13/17  2236 04/18/16  0905 11/02/15  0912 11/01/15  0727 10/31/15  0810   MAG 2.4* 2.3 1.8 2.1 2.1   < > 2.0  --   --  2.0  --    PHOS  --   --   --   --   --   --  2.4* 3.2 3.2 2.1* 2.4*    < > = values in this interval not displayed.     Recent Labs   Lab Test 07/18/23  1158 02/27/23  1306 12/09/22  1000 10/08/22  1340 09/05/22  1446   WBC 6.4 8.4 7.7 10.6 8.3   HGB 12.9 12.8 13.3 12.0 11.6*    192 240 228 210   MCV 97 97 97 96 96   NEUTROPHIL 71 73 78 72 75     Recent Labs   Lab Test 07/18/23  1158 02/27/23  1306 12/09/22  1000 09/05/22  1446 05/31/22  2342 12/09/21  2356 11/19/21  0911 08/11/21  1031 09/11/18  0612 07/17/18  1346   BILITOTAL 0.5 0.4 0.5   < > 0.4   < > 0.5 0.4   < >  --    ALKPHOS 82 92 91   < > 103   < > 98 88   < >  --    ALT 11 19 19   < > 21   < > 23 17   < >  --    AST 22  --  27  --  20   < > 15 14   < >  --    ALBUMIN 4.2 3.9 4.2   < > 3.7   < > 3.4 3.5   < >  --    LDH  --   --   --   --   --   --  180 176  --  204    < > = values in this interval not displayed.     TSH   Date Value Ref Range Status   06/22/2023 2.39 0.30 - 4.20 uIU/mL Final   12/09/2022 2.62 0.30 - 4.20 uIU/mL Final   05/20/2022 2.90 0.40 - 4.00 mU/L  Final   09/02/2021 1.24 0.40 - 4.00 mU/L Final   07/05/2021 1.68 0.40 - 4.00 mU/L Final   05/27/2021 1.93 0.40 - 4.00 mU/L Final   01/27/2021 2.42 0.40 - 4.00 mU/L Final     No results for input(s): CEA in the last [id number] hours.  Results for orders placed or performed during the hospital encounter of 09/13/23   PET Oncology (Eyes to Thighs)    Narrative    Combined Report of: PET and CT on 9/13/2023 1:58 PM:    1. PET of the neck, chest, abdomen, and pelvis.  2. PET CT Fusion for Attenuation Correction and Anatomical  Localization.  3. Diagnostic CT of the chest, abdomen and pelvis with intravenous  contrast obtained for diagnostic interpretation.  4. 3D MIP and PET-CT fused images were processed on an independent  workstation and archived to PACS and reviewed by a radiologist.    Technique:     1. PET: The patient received 10.11 mCi of F-18-FDG. The serum glucose  was 121 mg/dL prior to administration. Body weight was 79 kg. Images  were evaluated in the axial, sagittal, and coronal planes as well as  the rotational whole body MIP. Images were acquired from the cranial  vertex to thighs.    UPTAKE WAS MEASURED AT 62 MINUTES.     BACKGROUND: Liver SUV max = 4.3, Aorta Blood SUV max = 3.2.     2. CT: Volumetric acquisition for clinical interpretation of the  chest, abdomen, and pelvis acquired at 3 mm sections. The chest,  abdomen, and pelvis were evaluated at 5 mm sections in bone, soft  tissue, and lung windows.    Contrast and Medications:  IV contrast: 95 mL of Isovue 370 intravenously.  PO contrast: none.  Additional Medications: None.    3. 3D MIP and PET-CT fused images were processed on an independent  workstation and archived to PACS and reviewed by a radiologist.    INDICATION: On surveillance after adjuvant chemotherapy for stage IV  (dT0C2T2) urothelial Ca of rt renal pelvis-(nephroureterectomy   pericaval node Sx 11/13/18); thickening of pleura; Urothelial  carcinoma (H); Graves disease; Pulmonary  nodules.    ADDITIONAL INFORMATION OBTAINED FROM EMR: 89-year-old??woman with  stage IV ([id number]) transitional cell carcinoma of right renal pelvis  and non-muscle invasive bladder cancer. History of Graves disease.  Right thyroid nodule FNA with nondiagnostic result in 2007. PET/CT for  restaging.  - 11/13/2018- Right robotic nephroureterectomy, excision of sandip-caval  mass and LN Sandip-caval mass  - 12/12/18-02/13/19: adjuvant chemotherapy (carbo+gem)  - 1/13/20: Bladder biopsy result of high grade urothelial carcinoma  in-situ  - 2/12/20-3/18/20: Intravesical BCG therapy  - 11/25/20-12/10/2020: intravesical BCG 50mg    COMPARISON: PET/CT 12/11/2018; CT 6/12/2023, 12/9/2022, 11/19/2021,  5/11/2021; thyroid US 2/10/2023.      FINDINGS:     HEAD/NECK:  Increased uptake in the 1.7 x 1.5 cm hypoattenuating left thyroid  nodule with SUV max 7.5 (4/91), previously 1.1 x 0.8 cm with SUV max  4.7 on PET/CT dated 12/11/2018.    The paranasal sinuses are clear. The mastoid air cells are clear.  Bilateral pseudophakia.    The mucosal and deep spaces of the neck are unremarkable. The major  salivary glands are unremarkable. The major vasculature of the neck  are patent.    CHEST:  Moderate uptake in the 2.4 x 0.9 cm left pleural soft tissue lesion  with SUV max 6.2 (4/136), which has gradually increased in size when  it measured 2.3 x 0.7 cm on CT dated 12/19/2022 and new since CT dated  11/19/2021.    The central tracheobronchial tree is clear. No pleural effusion or  pneumothorax. No acute consolidation. Similar-appearing subcentimeter  pulmonary nodules, including 0.5 cm left upper lobe nodule (8/29).  Minimal bilateral dependent atelectasis.    Heart size is within normal limits. No pericardial effusion. Aortic  valvular calcification. Atherosclerotic calcifications of the thoracic  aorta. The thoracic aorta and main pulmonary artery are within normal  limits for diameter. Moderate-sized hiatal hernia.    ABDOMEN AND  PELVIS:  Abnormalities in the colon the most pronounced in the 10 cm segment  around the hepatic flexure which courses through the nephrectomy bed  with hypermetabolism max SUV 9.9 with loss of haustrations increased  vascularity and mucosal enhancement.     Limited evaluation of the urinary bladder secondary to radiotracer.  Hepatic steatosis. Cholecystectomy. Prominent extrahepatic biliary  ductal dilatation, likely secondary to age and postcholecystectomy.  The pancreas is unremarkable. No pancreatic ductal dilatation.  Splenule. The adrenal glands are unremarkable.    Right nephrectomy. No left hydronephrosis. The reproductive organs are  unremarkable. Pelvic phleboliths.   No free air or fluid. Normal caliber of the abdominal aorta with  atherosclerotic calcifications.    BONES:   No suspicious uptake in the skeleton. Degenerative uptake of the L4-5  and L5-S1 facet arthropathy. No suspicious lytic or blastic osseous  lesions. Multilevel degenerative changes and chronic wedge compression  deformities.        Impression    IMPRESSION: In this patient with right renal pelvis transitional cell  carcinoma s/p right nephrectomy and chemotherapy and bladder cancer  s/p BCG therapy:     1. Increased size of the hypermetabolic left pleural soft tissue  lesion, new since 2021, likely neoplastic process.     2. Increased hypermetabolism and size of the 1.7cm left thyroid nodule  when compared to PET/CT dated 12/11/2018 in this patient with known  Graves disease. Statistically ~50% chance of thyroid malignancy.     3. Limited evaluation of the urinary bladder secondary to radiotracer.  Can consider PET/CT with Lasix protocol for future evaluation.     4. Hypermetabolic 10 cm segment of colon near the hepatic flexure  (part of which is in the right nephrectomy bed) with loss of  haustrations.   Differential inflammatory, infectious, ischemic,  and  malignant etiologies.    I have personally reviewed the examination and  initial interpretation  and I agree with the findings.    ANNA DOWNEY MD         SYSTEM ID:  [id number]   CT Chest/Abdomen/Pelvis w Contrast    Narrative    Combined Report of: PET and CT on 9/13/2023 1:58 PM:    1. PET of the neck, chest, abdomen, and pelvis.  2. PET CT Fusion for Attenuation Correction and Anatomical  Localization.  3. Diagnostic CT of the chest, abdomen and pelvis with intravenous  contrast obtained for diagnostic interpretation.  4. 3D MIP and PET-CT fused images were processed on an independent  workstation and archived to PACS and reviewed by a radiologist.    Technique:     1. PET: The patient received 10.11 mCi of F-18-FDG. The serum glucose  was 121 mg/dL prior to administration. Body weight was 79 kg. Images  were evaluated in the axial, sagittal, and coronal planes as well as  the rotational whole body MIP. Images were acquired from the cranial  vertex to thighs.    UPTAKE WAS MEASURED AT 62 MINUTES.     BACKGROUND: Liver SUV max = 4.3, Aorta Blood SUV max = 3.2.     2. CT: Volumetric acquisition for clinical interpretation of the  chest, abdomen, and pelvis acquired at 3 mm sections. The chest,  abdomen, and pelvis were evaluated at 5 mm sections in bone, soft  tissue, and lung windows.    Contrast and Medications:  IV contrast: 95 mL of Isovue 370 intravenously.  PO contrast: none.  Additional Medications: None.    3. 3D MIP and PET-CT fused images were processed on an independent  workstation and archived to PACS and reviewed by a radiologist.    INDICATION: On surveillance after adjuvant chemotherapy for stage IV  ([id number]) urothelial Ca of rt renal pelvis-(nephroureterectomy   pericaval node Sx 11/13/18); thickening of pleura; Urothelial  carcinoma (H); Graves disease; Pulmonary nodules.    ADDITIONAL INFORMATION OBTAINED FROM EMR: 89-year-old??woman with  stage IV ([id number]) transitional cell carcinoma of right renal pelvis  and non-muscle invasive bladder cancer. History of  Graves disease.  Right thyroid nodule FNA with nondiagnostic result in 2007. PET/CT for  restaging.  - 11/13/2018- Right robotic nephroureterectomy, excision of sandip-caval  mass and LN Sandip-caval mass  - 12/12/18-02/13/19: adjuvant chemotherapy (carbo+gem)  - 1/13/20: Bladder biopsy result of high grade urothelial carcinoma  in-situ  - 2/12/20-3/18/20: Intravesical BCG therapy  - 11/25/20-12/10/2020: intravesical BCG 50mg    COMPARISON: PET/CT 12/11/2018; CT 6/12/2023, 12/9/2022, 11/19/2021,  5/11/2021; thyroid US 2/10/2023.      FINDINGS:     HEAD/NECK:  Increased uptake in the 1.7 x 1.5 cm hypoattenuating left thyroid  nodule with SUV max 7.5 (4/91), previously 1.1 x 0.8 cm with SUV max  4.7 on PET/CT dated 12/11/2018.    The paranasal sinuses are clear. The mastoid air cells are clear.  Bilateral pseudophakia.    The mucosal and deep spaces of the neck are unremarkable. The major  salivary glands are unremarkable. The major vasculature of the neck  are patent.    CHEST:  Moderate uptake in the 2.4 x 0.9 cm left pleural soft tissue lesion  with SUV max 6.2 (4/136), which has gradually increased in size when  it measured 2.3 x 0.7 cm on CT dated 12/19/2022 and new since CT dated  11/19/2021.    The central tracheobronchial tree is clear. No pleural effusion or  pneumothorax. No acute consolidation. Similar-appearing subcentimeter  pulmonary nodules, including 0.5 cm left upper lobe nodule (8/29).  Minimal bilateral dependent atelectasis.    Heart size is within normal limits. No pericardial effusion. Aortic  valvular calcification. Atherosclerotic calcifications of the thoracic  aorta. The thoracic aorta and main pulmonary artery are within normal  limits for diameter. Moderate-sized hiatal hernia.    ABDOMEN AND PELVIS:  Abnormalities in the colon the most pronounced in the 10 cm segment  around the hepatic flexure which courses through the nephrectomy bed  with hypermetabolism max SUV 9.9 with loss of  haustrations increased  vascularity and mucosal enhancement.     Limited evaluation of the urinary bladder secondary to radiotracer.  Hepatic steatosis. Cholecystectomy. Prominent extrahepatic biliary  ductal dilatation, likely secondary to age and postcholecystectomy.  The pancreas is unremarkable. No pancreatic ductal dilatation.  Splenule. The adrenal glands are unremarkable.    Right nephrectomy. No left hydronephrosis. The reproductive organs are  unremarkable. Pelvic phleboliths.   No free air or fluid. Normal caliber of the abdominal aorta with  atherosclerotic calcifications.    BONES:   No suspicious uptake in the skeleton. Degenerative uptake of the L4-5  and L5-S1 facet arthropathy. No suspicious lytic or blastic osseous  lesions. Multilevel degenerative changes and chronic wedge compression  deformities.        Impression    IMPRESSION: In this patient with right renal pelvis transitional cell  carcinoma s/p right nephrectomy and chemotherapy and bladder cancer  s/p BCG therapy:     1. Increased size of the hypermetabolic left pleural soft tissue  lesion, new since 2021, likely neoplastic process.     2. Increased hypermetabolism and size of the 1.7cm left thyroid nodule  when compared to PET/CT dated 12/11/2018 in this patient with known  Graves disease. Statistically ~50% chance of thyroid malignancy.     3. Limited evaluation of the urinary bladder secondary to radiotracer.  Can consider PET/CT with Lasix protocol for future evaluation.     4. Hypermetabolic 10 cm segment of colon near the hepatic flexure  (part of which is in the right nephrectomy bed) with loss of  haustrations.   Differential inflammatory, infectious, ischemic,  and  malignant etiologies.    I have personally reviewed the examination and initial interpretation  and I agree with the findings.    ANNA DOWNEY MD         SYSTEM ID:  [id number]     *Note: Due to a large number of results and/or encounters for the requested time  period, some results have not been displayed. A complete set of results can be found in Results Review.        ASSESSMENT AND PLAN: Ms. 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    She is being seen in person and is accompanied by her daughter from [facility]. She has been in good health. She is being seen with PETCT scans to further investigate a new pleural based lesion in left lung.    Her PET CT scan does show modest FDG uptake of 6.2 in the lesion of concern. While it has grown and is new from 2021, the change is merely 2 mm from last year December. It is hard to say for sure what it is without a biopsy. I reviewed CT guided pleural biopsy. I reviewed the risks, benefits and alternatives to a biopsy. I reviewed there risk of a pneumothorax.     In addition to the lung lesion, there if small FDG uptake in 10 cm of hepatic flexure of colon.     She also has an FDG avid thyroid nodule.     We reviewed the risks and benefits from biopsy of thee lesions. If the left lower lobe pleural based nodule is a malignancy, we could consider radiation for her. She would have options of stereotactic radiation with few fractions    Had she been 20 yrs younger, I would have pursued biopsies of lung, thyroid and colonoscopy.    She would personally favor monitoring at this time. I reviewed continued surveillance with scans every 6 months for the moment.     1. Upper tract urothelial cancer:   - She completed 4 cycles of adjuvant carboplatin plus gemcitabine chemotherapy per POUT trial.    She completed adjuvant chemotherapy in Feb 2019 over 4 yrs ago  - No residual side effects or evidence of recurrence.  - Reviewed restaging CT scan from June 12th, 2023; there is no clear evidence of disease recurrence in  abd/pelvis.   She continues to have pulmonary nodules which remain stable except for growth in one of the pulmonary nodule    She also has pleural nodule in left lower lobe.    Lung findings are non-specific and these are also not responsible for her shortness of breath    - I reviewed option of PET-CT scans vs biopsy. The definitive diagnosis would only come after a biopsy. However, PET-CT scan has much higher sensitivity and it would change level of suspicion for the biopsy.    - I will follow her in 2 months with labs and restaging PET-CT scan    2. High grade urothelial carcinoma in situ:  - Bx proven carcinoma in situ in 1/2020, completed the first round of intravesical BCG treatment 2/2020-3/2020 and second in 11/2020-12/2020.  - She was initially followed by Dr. King and now is under care of Dr. Froilan Henry.   - She did not have urine cytology done and I will get it after this visit.   - She will continue on 3 weekly BCG every 3 months as part of her maintenance along with follow up cystoscopies    3. Chemo induced anemia and thrombocytopenia:  - Resolved.      4. Persistent HERMAN:  - She follows Dr. Griffith in Cardiology for her h/o moderate AORTIC STENOSIS, CHF, and Afib now with worsening SOA and lymphedema with no cancer-related etiology evident.     Return to clinic in 6 months with restaging scans.    All of the above was explained to the patient in lay language and several questions were answered. They are in agreement with the plan.        Addendum: I have reviewed his case with my colleagues in interventional pulmonology.  They did agree with the above plan.  There will be substantial risks for pneumothorax even with a needle biopsy and probably excisional biopsy would have been a better choice has she been younger.  Continued surveillance appears to be the best strategy for her as long as she remains asymptomatic.  If she has any symptoms from her lesion we could consider radiation even without a  biopsy.     45 minutes spent on the date of the encounter doing chart review, history and exam, documentation and further activities as noted above     Sd Fine    Hematologist and Medical Oncologist  [facility]           MEDICAL ONCOLOGY RETURN VISIT NOTE  Mar 21, 2024    REFERRING PROVIDER: Stuart King MD    REASON FOR CURRENT VISIT: Evaluation while on surveillance after adjuvant chemotherapy for high-grade transitional cell carcinoma of right renal pelvis.    HISTORY OF PRESENT ILLNESS:  Ms. Dimple Oviedo is a 89 year old  lady with a high-grade stage IV ([id number]) transitional cell carcinoma of right renal pelvis and NMIBC. Her oncologic history is as under.    Ms. Oviedo is doing well. She denies any complains suggestive of recurrent disease. She had covid at the beginning of the month with a cough. She is feeling better now. She has no new pains in her body. No hematuria. No fevers or chills. No chest pain or shortness of breath.     She is accompanied by her son Issa and Daughter via telephone.        ONCOLOGIC HISTORY:  1. High-grade, muscle-invasive, transitional cell carcinoma of right renal pelvis, stage IV ([id number]):  - Dec 2017- Started having gross hematuria.   - Jan 2018 to September 2018- Persistent gross hematuria and underwent multiple procedures.    - 2/19/18 and 4/3/18- cystoscopies with bladder biopsies  which were negative for bladder tumor  - 1/17/18, 3/8/18, 7/26/18, 9/10/18- Multiple urine cytologies have come back positive by FISH for high-grade transitional cell carcinoma  - 9/10/18- Underwent a bilateral cystoureteroscopy with biopsy and brushing that showed  right upper tract cytology suspicious for high-grade urothelial ca. Right ureter brushing negative from that day. Left upper tract negative.  - 9/10/18- CT A/P without contrast showed new small bilateral pleural effusions and interstitial pulmonary edema but no evidence of locoregional or metastatic disease.  -  "9/12/18- Presented to ED with oliguria, CRESENCIO, and mild hydronephrosis bilaterally on CT scan and underwent bilateral ureteral stent placment  - 11/13/2018- Right robotic nephroureterectomy, excision of ana-caval mass and LN excision by Stuart King. Pathology showed \"Right nephroureterectomy: Invasive high grade urothelial carcinoma measuring 3.5 cm, located in renal pelvis and invading through renal parenchyma into perinephric fat. Urothelial carcinoma in-situ present. Margins free of tumor. Ana-caval mass: Metastatic urothelial carcinoma involving one lymph node with at least 1 cm tumor deposit. Pericaval Extranodal Extension present. Five additional benign pericaval lymph nodes. Pathologic stage: pT4N1 (1 of 6 lymph nodes).\"  - 12/11/18- PET/CT whole body demonstrated significant residual FDG avid disease. For example, \"there is an FDG avid ill-defined pericaval mass immediately medial to the surgical clips measuring approximately 2.0 x 1.4 cm, with max SUV measuring up to12.2, see series 4 image 21. Additional FDG avid retrocaval and interaortocaval lymphadenopathy are noted.There is a 1.2 cm nodule in the right hemipelvis posterior lateral tothe bladder with max SUV measuring 8.3, see series 4 image 77. The bladder is incompletely distended.\" No suspicious thoracic or bone uptake.  - 12/12/18- Started adjuvant chemotherapy (carbo+gem) per POUT trial. Cycle 2 - 1/2/19. Cycle 3 - 1/23/19. Cycle 4 - 02/13/19.  - 1/22/19 - CT C/A/P with contrast compared with prior PET/CT: \"1. New well-circumscribed soft tissue pulmonary nodules of the right lower lobe and left upper lobe. Findings could be infectious, inflammatory, or represent metastatic disease. Continued attention on follow-up recommended. Postoperative changes of right nephrectomy. No evidence for local recurrence in the present study.\"  - 3/1/2019- CT C/A/P with contrast - \"1. Bilateral pulmonary nodules measuring up to 4 mm in the right lower lobe, " "previously measuring 8 mm. No new or enlarging pulmonary nodules. 2. No convincing evidence for metastatic disease in the abdomen, pelvis and bones.\"  - 6/3/2019- CT C/A/P with contrast - \"1. Surgical changes of right nephrectomy without evidence of residual or recurrent disease on this noncontrast exam.  Consider contrast enhanced examination on follow-up. 2. No evidence of metastatic disease in the abdomen, or pelvis on noncontrast CT.  Scattered tiny pulmonary nodules in the chest are not significantly changed.  Previously described prominent right lower lobe pulmonary nodule (previously measuring up to 8 mm) is no longer visualized. 3. Stable bilateral pulmonary nodules measuring maximally 4 mm.\"  - 09/11/19: CT C/A/P without contrast - \"1. Stable exam without evidence convincing for new disease. 2. Stable pulmonary nodules. 3. 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 4. Stable heterogeneous enlargement of the thyroid with underlying nodules suggested.\"  - 12/4/19: CT C/A/P without contrast - \"No acute change since prior exam. No evidence of recurrent or metastatic disease. Tiny lung nodules are stable. Prior right nephrectomy. Left renal artery aneurysm is stable.\"  - 04/08/20, 7/27/20: CT C/A/P with contrast - GABRIELLE.  - 01/12/21: CT C/A/P with contrast - \"1.  Slight increased size of a 6 mm left upper lobe nodule and new 4 mm linear opacity along the right major fissure. These are indeterminate but could represent progression of metastatic disease. 2.  Slight increased size of mildly enlarged mediastinal and hilar lymph nodes. 3.  Mild pulmonary edema. 4.  No recurrent or metastatic disease in the abdomen and pelvis. 5.  Mild stranding around the urinary bladder dome may be related to cystitis. Punctate focus of gas within the urinary bladder either secondary to infection or instrumentation. 6.  Enlarged multinodular thyroid gland.\"    2. Non-muscle invasive bladder cancer:  - 10/3/19: " Cystoscopy showed a small area of erythema on retroflexion, possibly pre-existing or due to retroflexion of the scope. Urine cytology from that time showed cells suspicious for malignancy.   - 1/13/20: Bladder biopsy showed high grade urothelial carcinoma in-situ  - 2/12/20-3/18/20: Intravesical BCG therapy  - 7/24/20 and last cystoscopy was on the same day. It was negative. However, urine cytology was positive for high-grade urothelial carcinoma.   - 11/25/20-12/10/2020: 3 weekly doses of intravesical BCG 50mg.   - 12/10/20: Cytology suspicious and FISH urovysion positive for TCC.    REVIEW OF SYSTEMS: 14 point ROS negative other than the symptoms noted above in the HPI.    PAST MEDICAL AND SURGICAL HISTORY:   Past Medical History:   Diagnosis Date     CRESENCIO (acute kidney injury) (H24) 9/11/2018     Arthritis      Asthma 2/9/2022     Calculus of kidney      Chemotherapy-induced neutropenia  (H24) 3/6/2019     Congestive heart failure (H)      Esophageal reflux      GERD (gastroesophageal reflux disease)      Hyperlipidemia LDL goal <130 5/9/2010     Malignant melanoma of skin of trunk, except scrotum (H)      Nonspecific abnormal finding     has living will 2004 -      Nontoxic multinodular goiter     no further eval /tx rec per pt     Osteopenia      Other psoriasis      Personal history of colonic polyps      PMR (polymyalgia rheumatica) (H24)      Restless legs syndrome 7/11/2018     Stress-induced cardiomyopathy      Undiagnosed cardiac murmurs      Unspecified constipation      Unspecified essential hypertension      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     Past Surgical History:   Procedure Laterality Date     ARTHROPLASTY KNEE Right 11/9/2020    Procedure: ARTHROPLASTY, KNEE, TOTAL, RIGHT;  Surgeon: Edward Otero MD;  [facility]     BIOPSY       COLONOSCOPY  2014     COMBINED CYSTOSCOPY, INSERT STENT URETER(S) Bilateral 9/12/2018    Procedure: COMBINED CYSTOSCOPY, INSERT STENT URETER(S);  Cystoscopy  Bilateral ureteral Stent Placement.;  Surgeon: Justin Henry MD;  [facility]     COMBINED CYSTOSCOPY, RETROGRADES, URETEROSCOPY, INSERT STENT Bilateral 4/3/2018    Procedure: COMBINED CYSTOSCOPY, RETROGRADES, URETEROSCOPY, INSERT STENT;;  Surgeon: Stuart King MD;  [facility]     COMBINED CYSTOSCOPY, RETROGRADES, URETEROSCOPY, INSERT STENT Bilateral 9/10/2018    Procedure: COMBINED CYSTOSCOPY, RETROGRADES, URETEROSCOPY, INSERT STENT;  Cystoscopy, Bilateral Ureteroscopy, Bladder Biopsies, Retrogram Pyelograms, Ureteral Washings and brushings, cysview;  Surgeon: Stuart King MD;  [facility]     COMBINED CYSTOSCOPY, RETROGRADES, URETEROSCOPY, INSERT STENT Left 8/26/2020    Procedure: Cystoscopy, Left Ureteral Wash, Left ureteral Retrograde Pyelogram;  Surgeon: Justin Henry MD;  [facility]     CYSTOSCOPY, BIOPSY BLADDER INSTILL OPTICAL AGENT N/A 4/3/2018    Procedure: CYSTOSCOPY, BIOPSY BLADDER INSTILL OPTICAL AGENT;  Cystoscopy, Blue Light Cystoscopy, Bladder Biopsies, Bilateral Selective ureteral washings for Cytology, Bilateral Retrograde Pyelograms, Bilateral Ureteroscopy;  Surgeon: Stuart King MD;  [facility]     CYSTOSCOPY, BIOPSY BLADDER, COMBINED N/A 2/19/2018    Procedure: COMBINED CYSTOSCOPY, BIOPSY BLADDER;  Cystoscopy, Bladder Biopsy;  Surgeon: Kenna La MD;  [facility]     CYSTOSCOPY, BIOPSY BLADDER, COMBINED N/A 8/26/2020    Procedure: Cystoscopy, biopsy bladder, combined;  Surgeon: Justin Henry MD;  [facility]     CYSTOSCOPY, FULGURATE BLADDER TUMOR, COMBINED N/A 1/13/2020    Procedure: CYSTOSCOPY, WITH  bladder biopsies and fulguration;  Surgeon: Stuart King MD;  [facility]     CYSTOSCOPY, REMOVE STENT(S), COMBINED  11/13/2018    Procedure: Flexible Cystoscopy with Stent Removal;  Surgeon: Stuart King MD;  [facility]     DAVINCI LYMPHADENECTOMY N/A  11/13/2018    Procedure: Davinci Lymphadenectomy ;  Surgeon: Stuart King MD;  [facility]     DAVINCI NEPHROURETERECTOMY N/A 11/13/2018    Procedure: Right DaVinci Assisted Nephroureterectomy;  Surgeon: Stuart King MD;  [facility]     ENDOSCOPIC ULTRASOUND LOWER GASTROINTESTIONAL TRACT (GI) N/A 10/30/2015    Procedure: ENDOSCOPIC ULTRASOUND LOWER GASTROINTESTIONAL TRACT (GI);  Surgeon: Daniel Jean-Baptiste MD;  [facility]     ESOPHAGOSCOPY, GASTROSCOPY, DUODENOSCOPY (EGD), COMBINED N/A 11/29/2023    Procedure: Esophagoscopy, gastroscopy, duodenoscopy (EGD), combined;  Surgeon: Justin Peñaloza MD;  [facility]     EYE SURGERY  12/4/17     INSERT PORT VASCULAR ACCESS Right 12/19/2018    Procedure: Chest Port Placement - right;  Surgeon: Stuart Chavez PA-C;  [facility]     IR CHEST PORT PLACEMENT > 5 YRS OF AGE  12/19/2018     IR PORT REMOVAL RIGHT  6/26/2019     LAPAROSCOPIC CHOLECYSTECTOMY WITH CHOLANGIOGRAMS N/A 11/1/2015    Procedure: LAPAROSCOPIC CHOLECYSTECTOMY WITH CHOLANGIOGRAMS;  Surgeon: Tonie Warren MD;  [facility]     REMOVE PORT VASCULAR ACCESS Right 6/26/2019    Procedure: Right Port Removal;  Surgeon: Froilan Irizarry PA-C;  [facility]     SURGICAL HISTORY OF -   1996    malignant melanoma     SURGICAL HISTORY OF -   1968    thyroid nodule     SURGICAL HISTORY OF -       D & C     ZZC NONSPECIFIC PROCEDURE  2005    colonoscopy polyp repeat 2010     SOCIAL HISTORY:   Social History     Tobacco Use     Smoking status: Never     Passive exposure: Never     Smokeless tobacco: Never   Vaping Use     Vaping Use: Never used   Substance Use Topics     Alcohol use: No     Alcohol/week: 0.0 standard drinks of alcohol     Comment: rare     Drug use: No     FAMILY HISTORY:   Family History   Problem Relation Age of Onset     Cancer Father         dec - esophageal and laryngeal     Heart Disease Mother      Respiratory Mother          dec     Breast Cancer Daughter      Other Cancer Daughter      Thyroid Disease Daughter      Asthma Daughter      Hyperlipidemia Son      Diabetes Son      Anesthesia Reaction No family hx of      Deep Vein Thrombosis (DVT) No family hx of      ALLERGIES:   No Known Allergies  CURRENT MEDICATIONS:   Current Outpatient Medications:      alendronate (FOSAMAX) 70 MG tablet, TAKE 1 TABLET EVERY 7 DAYS AT LEAST 60 MINUTES BEFORE BREAKFAST AS DIRECTED., Disp: 12 tablet, Rfl: 2     diltiazem ER (DILT-XR) 180 MG 24 hr capsule, Take 1 capsule (180 mg) by mouth daily, Disp: 90 capsule, Rfl: 3     ferrous sulfate 325 (65 Fe) MG TBEC EC tablet, Take 325 mg by mouth every morning , Disp: , Rfl:      furosemide (LASIX) 20 MG tablet, TAKE 1 TABLET TWICE DAILY IN THE MORNING  AND  AFTER  LUNCH, Disp: 180 tablet, Rfl: 3     irbesartan (AVAPRO) 300 MG tablet, Take 1 tablet (300 mg) by mouth daily, Disp: 90 tablet, Rfl: 3     lovastatin (MEVACOR) 40 MG tablet, TAKE 1 TABLET AT BEDTIME (SCHEDULE ANNUAL VISIT, NEED FASTING LABS), Disp: 90 tablet, Rfl: 0     methimazole (TAPAZOLE) 5 MG tablet, TAKE 1 TABLET ALTERNATING WITH 1/2 TABLET EVERY OTHER DAY, Disp: 66 tablet, Rfl: 3     Omega-3 Fatty Acids (FISH OIL) 500 MG CAPS, Take 1 capsule by mouth every morning , Disp: , Rfl:      omeprazole (PRILOSEC) 20 MG DR capsule, Take 1 capsule (20 mg) by mouth daily, Disp: 90 capsule, Rfl: 3     propranolol ER (INDERAL LA) 60 MG 24 hr capsule, TAKE 1 CAPSULE EVERY DAY, Disp: 90 capsule, Rfl: 2     RESTASIS 0.05 % ophthalmic emulsion, , Disp: , Rfl:      rivaroxaban ANTICOAGULANT (XARELTO) 15 MG TABS tablet, Take 1 tablet (15 mg) by mouth daily (with dinner), Disp: 90 tablet, Rfl: 3     rOPINIRole (REQUIP) 0.25 MG tablet, TAKE 1 TABLET IN THE AFTERNOON AND TAKE 2 TABLETS EVERY NIGHT, Disp: 270 tablet, Rfl: 3     sertraline (ZOLOFT) 100 MG tablet, Take 1 tablet (100 mg) by mouth every morning, Disp: 90 tablet, Rfl: 3     traZODone (DESYREL) 50 MG  tablet, TAKE 1/2 TABLET AT BEDTIME, Disp: 45 tablet, Rfl: 3    Current Facility-Administered Medications:      lidocaine (XYLOCAINE) 2 % external gel, , Urethral, Once, Justin Henry MD    PHYSICAL EXAMINATION:  General: No acute distress  HEENT: Sclera anicteric. Oral mucosa pink and moist.  No mucositis or thrush  Lymph: No lymphadenopathy in neck  Heart: Regular, rate, and rhythm  Lungs: Clear to ascultation bilaterally  Abdomen: Positive bowel sounds. Soft, non-distended, non-tender. No organomegaly or mass.   Extremities: no lower extremity edema  Neuro: Cranial nerves grossly intact  Rash: none  Vascular access: port    LABORATORY DATA:   Most Recent 3 CBC's:  Recent Labs   Lab Test 03/15/24  1232 12/18/23  1548 12/07/23  1613 11/14/23  0022 11/13/23 1952 11/08/23  1431 07/18/23  1158   WBC 10.0 7.6 8.4 8.0 7.5   < > 6.4   HGB 12.9 12.7 12.8 12.3 13.0   < > 12.9   MCV 93 98 96 98 98   < > 97    214 220 191 221   < > 209   ANEUTAUTO  --   --   --  5.4 5.2  --  4.5    < > = values in this interval not displayed.     Most Recent 3 BMP's:  Recent Labs   Lab Test 03/15/24  1143 12/18/23  1548 12/07/23  1613 11/14/23  0022 11/13/23  1952 11/08/23  1431   NA  --  139 138 142 146* 138   POTASSIUM  --  4.6 4.7 4.1 5.3 4.5   CHLORIDE  --  99 100 103 95* 100   CO2  --  29 29 26 22 27   BUN  --  19.7 16.8 13.9 15.0 20.2   CR 1.1* 1.04* 0.93 0.87 0.84 1.01*   ANIONGAP  --  11 9 13 29* 11   SHREYA  --  9.4 9.4 9.1 9.1 9.6   GLC  --  101* 103* 104* 93 94   PROTTOTAL  --   --   --  7.0 7.2 7.2   ALBUMIN  --   --   --  4.1 4.0 4.2    Most Recent 3 LFT's:  Recent Labs   Lab Test 11/14/23  0022 11/13/23  1952 11/08/23  1431 07/18/23  1158 02/27/23  1306 12/09/22  1000   AST  --   --  22 22  --  27   ALT 21  --  17 11   < > 19   ALKPHOS 86  --  89 82   < > 91   BILITOTAL 0.5 0.5 0.5 0.5   < > 0.5    < > = values in this interval not displayed.    Most Recent 2 TSH and T4:  Recent Labs   Lab Test 06/22/23  1134  12/09/22  1000 09/02/21  0644 07/05/21  1347   TSH 2.39 2.62   < > 1.68   T4 0.84*  --   --  0.79    < > = values in this interval not displayed.     Urine cytology and FISH pending.     CT CAP 3/15/24:   FINDINGS:   LUNGS AND PLEURA: Pleural-based lesion measures 6 mm in width image 154 series 3, not significantly changed from previous. Stable 4 mm nodule in the left upper lobe image 88. Additional smaller nodules are stable.     MEDIASTINUM/AXILLAE: Small hiatal hernia. No adenopathy or free fluid. Cardiomegaly. Marked thyromegaly and numerous nodules not significantly different in appearance by CT. This is better evaluated with ultrasound.     CORONARY ARTERY CALCIFICATION: Minimal     HEPATOBILIARY: Cholecystectomy. Mild prominence of the biliary system likely related to reservoir effect. No focal liver lesions.     PANCREAS: No significant mass, duct dilatation, or inflammatory change.     SPLEEN: Normal size.     ADRENAL GLANDS: No significant nodules.     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 A few nonobstructing stones measuring up to 5 mm noted on the left. No new lesions. Nephrectomy. No hydronephrosis or ureteral stone.     BOWEL: No obstruction or inflammatory change.     LYMPH NODES: No abdominal pelvic adenopathy or free fluid.     VASCULATURE: There are moderate atherosclerotic changes of the visualized aorta and its branches. There is no evidence of aortic dissection or aneurysm.     PELVIC ORGANS: No pelvic masses.     MUSCULOSKELETAL: No frankly destructive bony lesions.                                                                      IMPRESSION:  1.  Stable pleural-based lesion in the posteromedial left hemithorax.  2.  Stable appearance of the thyroid.  3.  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    I reviewed the above labs and imaging today.      ASSESSMENT AND PLAN: Ms. 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      Dimple continues to remain in good health. She has no new reported symptoms in history. We spent time reviewing her CT CAP from 3/15/24. She has a stable pleural based lung change and a stable thyroid nodule when compared to 09/13/23 scan. There previously was a small FDG uptake in the hepatic flexure of the colon. There are no bowel lesions depicted in her scan from 3/15/24.   - Please see Dr. Fine's note from 09/2023 regarding biopsying the lesions. Overall, decision was to continue with surveillance with CT CAP every 6 months. If she has any symptoms from her lesion we could consider radiation even without a biopsy.    1. Upper tract urothelial cancer:   - She completed 4 cycles of adjuvant carboplatin plus gemcitabine chemotherapy per POUT trial.    She completed adjuvant chemotherapy in Feb 2019.  - No residual side effects or evidence of recurrence.  - Reviewed restaging CT scan 3/14/24 as above; there is no clear evidence of disease recurrence in abd/pelvis.   She continues to have pulmonary nodules which remain stable and a stable pleural nodule in left lower lobe. Surveillance as above at this time.         2. High grade urothelial carcinoma in situ:  - Bx proven carcinoma in situ in 1/2020, completed the first round of intravesical BCG treatment 2/2020-3/2020 and second in 11/2020-12/2020.  - She was initially followed by Dr. King and now is under care of Dr. Henry.   - She did not have urine cytology done will obtain today.   - per chart review of Dr. Henry's note on 2/23 she was to return in 6 months. Will request follow up with their team.     4. HERMAN:  - She follows Dr. Griffith in Cardiology for her h/o moderate AORTIC STENOSIS, CHF, and Afib with SOA and lymphedema with no cancer-related  etiology evident. Appears stable today.     Return to clinic in 6 months with restaging scans with Dr. Fine.    All of the above was explained to the patient in lay language and several questions were answered. They are in agreement with the plan.    40 minutes spent on the date of the encounter doing chart review, review of test results, interpretation of tests, patient visit, and documentation     Maral Johnson PA-C            Again, thank you for allowing me to participate in the care of your patient.        Sincerely,        Maral Johnson PA-C

## 2024-03-21 NOTE — NURSING NOTE
"Oncology Rooming Note    March 21, 2024 1:35 PM   Dimple Oviedo is a 89 year old female who presents for:    Chief Complaint   Patient presents with    Oncology Clinic Visit     Urothelial Ca     Initial Vitals: /74   Pulse 72   Temp 98.5  F (36.9  C) (Oral)   Resp 16   Wt 79.8 kg (176 lb)   SpO2 96%   BMI 36.45 kg/m   Estimated body mass index is 36.45 kg/m  as calculated from the following:    Height as of 12/18/23: 1.48 m (4' 10.27\").    Weight as of this encounter: 79.8 kg (176 lb). Body surface area is 1.81 meters squared.  No Pain (0) Comment: Data Unavailable   No LMP recorded. Patient is postmenopausal.  Allergies reviewed: Yes  Medications reviewed: Yes    Medications: Medication refills not needed today.  Pharmacy name entered into [facility]:    [facility] - [facility] [address] [facility] [place] [address] [facility] [address]  [id number] - [place], [place] - [address] AT [facility] OF [address]    Frailty Screening:   Is the patient here for a new oncology consult visit in cancer care? 2. No      Clinical concerns: UA collected from pt and sent to lab for processing.  Pt advised results will be relayed to her.         Macey Forbes CMA              "

## 2024-03-22 DIAGNOSIS — F41.1 GAD (GENERALIZED ANXIETY DISORDER): ICD-10-CM

## 2024-03-22 RX ORDER — SERTRALINE HYDROCHLORIDE 100 MG/1
100 TABLET, FILM COATED ORAL EVERY MORNING
Qty: 90 TABLET | Refills: 1 | Status: SHIPPED | OUTPATIENT
Start: 2024-03-22 | End: 2024-09-04

## 2024-03-27 DIAGNOSIS — G25.81 RESTLESS LEGS SYNDROME: ICD-10-CM

## 2024-03-27 RX ORDER — ROPINIROLE 0.25 MG/1
TABLET, FILM COATED ORAL
Qty: 270 TABLET | Refills: 2 | Status: SHIPPED | OUTPATIENT
Start: 2024-03-27 | End: 2024-07-03

## 2024-04-02 DIAGNOSIS — I10 HYPERTENSION GOAL BP (BLOOD PRESSURE) < 140/90: ICD-10-CM

## 2024-04-03 RX ORDER — IRBESARTAN 300 MG/1
300 TABLET ORAL DAILY
Qty: 90 TABLET | Refills: 3 | Status: SHIPPED | OUTPATIENT
Start: 2024-04-03

## 2024-04-05 DIAGNOSIS — K21.9 GASTROESOPHAGEAL REFLUX DISEASE WITHOUT ESOPHAGITIS: ICD-10-CM

## 2024-04-16 DIAGNOSIS — E78.5 HYPERLIPIDEMIA LDL GOAL <130: ICD-10-CM

## 2024-04-17 RX ORDER — LOVASTATIN 40 MG
TABLET ORAL
Qty: 90 TABLET | Refills: 1 | Status: SHIPPED | OUTPATIENT
Start: 2024-04-17

## 2024-04-17 NOTE — TELEPHONE ENCOUNTER
Prescription approved per [facility] Refill Protocol.  Natalie Haley, RN  [facility] Triage Nurse

## 2024-04-22 DIAGNOSIS — I87.303 STASIS EDEMA OF BOTH LOWER EXTREMITIES: ICD-10-CM

## 2024-04-23 RX ORDER — FUROSEMIDE 20 MG
TABLET ORAL
Qty: 180 TABLET | Refills: 3 | Status: SHIPPED | OUTPATIENT
Start: 2024-04-23

## 2024-04-25 ENCOUNTER — TELEPHONE (OUTPATIENT)
Dept: CARDIOLOGY | Facility: CLINIC | Age: 89
End: 2024-04-25
Payer: MEDICARE

## 2024-04-29 NOTE — PROGRESS NOTES
04/29/24 1:30 PM  PATIENT LAB/IMAGING STATUS : No pending lab orders   Susy Benson CMA      This 89  year old woman was seen  for f/u of Graves.  She is here today with her daughter and son-in-law.  She first developed symptoms of hyperthyroidism in December 2017.  She was most troubled by the tremor that occurred.  She had not noticed change in her skin or hair.  She saw Dr. Rojas in the community and he started her on methimazole.  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  Because she had received an iodine dye load when she had a coronary angiogram, we were unable to do a radioiodine scan and uptake to determine the precise cause of her hyperthyroidism.  She was maintained on methimazole until June 2017 when it was stopped.  A radioiodine uptake and scan was done in July, showed an uptake of ~ 70%, and confirmed she had Graves' disease.  She was restarted on methimazole in July 2018 and we have been adjusting the dose since then.  She has been taking 5 mg alternating with 2.5  every AM every other day for more than a year.  On this dose she generally felt well.Her energy level is good.  Her appetite is fine.  She has no tremor.  She does have atrial fibrillation but reports that she does not feel any palpitations.  She does not feel too hot or cold.  She denies diplopia or mattering.  She reports that her thyroid gland has gotten bigger on the right side of her neck.  She denies any dysphagia.  She does have some shortness of breath with exertion but does not have any feeling of air hunger.  The shortness of breath with exertion has been present for some time.      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  She is being carefully monitored for recurrence.  She is pain-free and feels very good.  She is followed by both urology and oncology.    She follows Dr. Griffith in Cardiology  "for her h/o moderate AORTIC STENOSIS, CHF, and Afib with SOA and lymphedema.  This problem has been stable.    Current Outpatient Medications   Medication Sig Dispense Refill    alendronate (FOSAMAX) 70 MG tablet TAKE 1 TABLET EVERY 7 DAYS AT LEAST 60 MINUTES BEFORE BREAKFAST AS DIRECTED. 12 tablet 2    diltiazem ER (DILT-XR) 180 MG 24 hr capsule Take 1 capsule (180 mg) by mouth daily 90 capsule 3    ferrous sulfate 325 (65 Fe) MG TBEC EC tablet Take 325 mg by mouth every morning       furosemide (LASIX) 20 MG tablet TAKE 1 TABLET TWICE DAILY IN THE MORNING AND AFTER LUNCH 180 tablet 3    irbesartan (AVAPRO) 300 MG tablet TAKE 1 TABLET EVERY DAY 90 tablet 3    lovastatin (MEVACOR) 40 MG tablet TAKE 1 TABLET AT BEDTIME (SCHEDULE ANNUAL VISIT, NEED FASTING LABS) 90 tablet 1    methimazole (TAPAZOLE) 5 MG tablet TAKE 1 TABLET ALTERNATING WITH 1/2 TABLET EVERY OTHER DAY 66 tablet 3    Omega-3 Fatty Acids (FISH OIL) 500 MG CAPS Take 1 capsule by mouth every morning       omeprazole (PRILOSEC) 20 MG DR capsule TAKE 1 CAPSULE EVERY DAY 90 capsule 0    propranolol ER (INDERAL LA) 60 MG 24 hr capsule TAKE 1 CAPSULE EVERY DAY 90 capsule 2    RESTASIS 0.05 % ophthalmic emulsion       rivaroxaban ANTICOAGULANT (XARELTO) 15 MG TABS tablet Take 1 tablet (15 mg) by mouth daily (with dinner) 90 tablet 3    rOPINIRole (REQUIP) 0.25 MG tablet TAKE 1 TABLET IN THE AFTERNOON AND TAKE 2 TABLETS EVERY NIGHT 270 tablet 2    sertraline (ZOLOFT) 100 MG tablet TAKE 1 TABLET EVERY MORNING 90 tablet 1    traZODone (DESYREL) 50 MG tablet TAKE 1/2 TABLET AT BEDTIME 45 tablet 3     Current Facility-Administered Medications   Medication Dose Route Frequency Provider Last Rate Last Admin    lidocaine (XYLOCAINE) 2 % external gel   Urethral Once Justin Henry MD           /63   Pulse 79   Ht 1.473 m (4' 10\")   Wt 80.3 kg (177 lb)   SpO2 94%   BMI 36.99 kg/m      VSS  NAD  Eyes - no periorbital edema, conjunctival injection, " scleral icterus  CV - RRR.   She has lymphedema and wears support stockings.  Neuro - sensation intact to monofilament on soles of feet.  DTR 2/4 biceps  Thyroid - right lobe is 2.5 in x 1 in with lateral extension into neck.  Soft with irregular borders - 2 nodules on surface on anterior and lateral aspects.  Left lobe palpable but not enlarged.  No nodules.    No LN felt in neck     Latest Ref Rng 6/22/2023  11:34 AM   ENDO THYROID LABS-UMP     TSH 0.40 - 4.00 mU/L    TSH 0.30 - 4.20 uIU/mL 2.39    Free T3 2.3 - 4.2 pg/mL    Triiodothyronine (T3) 85 - 202 ng/dL 91    FREE T4 0.90 - 1.70 ng/dL 0.84 (L)    Thyroglobulin Antibody <40 IU/mL    Thyroid Peroxidase Antibody <35 IU/mL    Thyroid Stim Immunog <=1.3 TSI index      Narrative & Impression   US THYROID 2/10/2023 12:30 PM     COMPARISON: Thyroid ultrasound 10/27/2021     HISTORY: Thyroid nodule     FINDINGS:   Thyroid parenchyma: Heterogeneous  The right lobe of the thyroid measures: 6.8 x 5.5 x 3.9 cm  The left lobe of the thyroid measures: 5.7 x 3.5 x 2.2 cm  The thyroid isthmus measures: 1.3 cm     Scattered poorly visualized nodules throughout the bilateral thyroid  lobes with scattered calcifications                                                                      Impression:  Diffusely diffusely heterogeneous with poorly visualize multi  nodularity not significantly changed relative to 10/27/2021.     ACR TI-RADS recommendations  TR2 (2 points) & TR1 (0 points) -No FNA or follow-up  TR3 (3 points) - FNA if ? 2.5cm, follow-up if 1.5 -2.4 cm in 1, 3 and  5 years  TR4 (4-6 points) - FNA if ? 1.5cm, follow-up if 1 -1.4 cm in 1, 2, 3  and 5 years  TR5 (?7 points) - FNA if ? 1cm, follow-up if 0.5 -0.9 cm every year  for 5 years      I have personally reviewed the examination and initial interpretation  and I agree with the findings.     REBA REED MD            Assessment/Plan    This 89-year-old woman has been on methimazole to treat her Graves'  disease for a few years.  Clinically she is euthyroid.  The right lobe of her thyroid has grown in size since our last exam.  It feels soft so I suspect that this is enlargement related to her ongoing autoimmune disease and/or her propensity towards thyroid nodules, which was noted earlier in her life.  I will order a thyroid ultrasound to define the anatomy.  I will check her thyroid function test today so I can adjust the methimazole dose.  I will schedule follow-up when I see the results of the ultrasound.    Dhara Meza MD

## 2024-04-30 DIAGNOSIS — E05.00 GRAVES DISEASE: ICD-10-CM

## 2024-04-30 DIAGNOSIS — E04.2 NONTOXIC MULTINODULAR GOITER: ICD-10-CM

## 2024-05-07 ENCOUNTER — MYC REFILL (OUTPATIENT)
Dept: ENDOCRINOLOGY | Facility: CLINIC | Age: 89
End: 2024-05-07
Payer: MEDICARE

## 2024-05-07 DIAGNOSIS — E05.00 GRAVES DISEASE: ICD-10-CM

## 2024-05-07 DIAGNOSIS — E04.2 NONTOXIC MULTINODULAR GOITER: ICD-10-CM

## 2024-05-07 RX ORDER — METHIMAZOLE 5 MG/1
TABLET ORAL
Qty: 66 TABLET | Refills: 3 | Status: SHIPPED | OUTPATIENT
Start: 2024-05-07

## 2024-05-08 NOTE — TELEPHONE ENCOUNTER
I had not heard back from [facility] Oncology.  I called again and got directly to clinic triage.    This message will be sent to oncology provider.  ATUL Lee     yes

## 2024-05-09 RX ORDER — METHIMAZOLE 5 MG/1
TABLET ORAL
Qty: 66 TABLET | Refills: 3 | OUTPATIENT
Start: 2024-05-09

## 2024-05-28 ENCOUNTER — LAB (OUTPATIENT)
Dept: LAB | Facility: CLINIC | Age: 89
End: 2024-05-28
Payer: MEDICARE

## 2024-05-28 ENCOUNTER — OFFICE VISIT (OUTPATIENT)
Dept: ENDOCRINOLOGY | Facility: CLINIC | Age: 89
End: 2024-05-28
Payer: MEDICARE

## 2024-05-28 VITALS
HEIGHT: 58 IN | OXYGEN SATURATION: 94 % | HEART RATE: 79 BPM | WEIGHT: 177 LBS | SYSTOLIC BLOOD PRESSURE: 121 MMHG | BODY MASS INDEX: 37.16 KG/M2 | DIASTOLIC BLOOD PRESSURE: 63 MMHG

## 2024-05-28 DIAGNOSIS — E05.00 GRAVES DISEASE: Primary | ICD-10-CM

## 2024-05-28 DIAGNOSIS — E04.1 THYROID NODULE: ICD-10-CM

## 2024-05-28 DIAGNOSIS — E05.00 GRAVES DISEASE: ICD-10-CM

## 2024-05-28 LAB
T3 SERPL-MCNC: 89 NG/DL (ref 85–202)
T4 FREE SERPL-MCNC: 0.74 NG/DL (ref 0.9–1.7)
TSH SERPL DL<=0.005 MIU/L-ACNC: 3.17 UIU/ML (ref 0.3–4.2)

## 2024-05-28 PROCEDURE — 84480 ASSAY TRIIODOTHYRONINE (T3): CPT | Performed by: INTERNAL MEDICINE

## 2024-05-28 PROCEDURE — 99000 SPECIMEN HANDLING OFFICE-LAB: CPT | Performed by: PATHOLOGY

## 2024-05-28 PROCEDURE — 36415 COLL VENOUS BLD VENIPUNCTURE: CPT | Performed by: PATHOLOGY

## 2024-05-28 PROCEDURE — 84443 ASSAY THYROID STIM HORMONE: CPT | Performed by: PATHOLOGY

## 2024-05-28 PROCEDURE — 99214 OFFICE O/P EST MOD 30 MIN: CPT | Performed by: INTERNAL MEDICINE

## 2024-05-28 PROCEDURE — G2211 COMPLEX E/M VISIT ADD ON: HCPCS | Performed by: INTERNAL MEDICINE

## 2024-05-28 PROCEDURE — 84439 ASSAY OF FREE THYROXINE: CPT | Performed by: PATHOLOGY

## 2024-05-28 ASSESSMENT — PAIN SCALES - GENERAL: PAINLEVEL: NO PAIN (0)

## 2024-05-28 NOTE — LETTER
5/28/2024       RE: Dimple vOiedo  [address]  [facility] [address]     Dear Colleague,    Thank you for referring your patient, Dimple Oviedo, to the [facility]OCR[facility] [place] [facility]. Please see a copy of my visit note below.    04/29/24 1:30 PM  PATIENT LAB/IMAGING STATUS : No pending lab orders   Susy Benson CMA      This 89  year old woman was seen  for f/u of Graves.  She is here today with her daughter and son-in-law.  She first developed symptoms of hyperthyroidism in December 2017.  She was most troubled by the tremor that occurred.  She had not noticed change in her skin or hair.  She saw Dr. Rojas in the community and he started her on methimazole.  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  Because she had received an iodine dye load when she had a coronary angiogram, we were unable to do a radioiodine scan and uptake to determine the precise cause of her hyperthyroidism.  She was maintained on methimazole until June 2017 when it was stopped.  A radioiodine uptake and scan was done in July, showed an uptake of ~ 70%, and confirmed she had Graves' disease.  She was restarted on methimazole in July 2018 and we have been adjusting the dose since then.  She has been taking 5 mg alternating with 2.5  every AM every other day for more than a year.  On this dose she generally felt well.Her energy level is good.  Her appetite is fine.  She has no tremor.  She does have atrial fibrillation but reports that she does not feel any palpitations.  She does not feel too hot or cold.  She denies diplopia or mattering.  She reports that her thyroid gland has gotten bigger on the right side of her neck.  She denies any dysphagia.  She does have some shortness of breath with exertion but does not have any feeling of air hunger.  The  shortness of breath with exertion has been present for some time.      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  She is being carefully monitored for recurrence.  She is pain-free and feels very good.  She is followed by both urology and oncology.    She follows Dr. Griffith in Cardiology for her h/o moderate AORTIC STENOSIS, CHF, and Afib with SOA and lymphedema.  This problem has been stable.    Current Outpatient Medications   Medication Sig Dispense Refill    alendronate (FOSAMAX) 70 MG tablet TAKE 1 TABLET EVERY 7 DAYS AT LEAST 60 MINUTES BEFORE BREAKFAST AS DIRECTED. 12 tablet 2    diltiazem ER (DILT-XR) 180 MG 24 hr capsule Take 1 capsule (180 mg) by mouth daily 90 capsule 3    ferrous sulfate 325 (65 Fe) MG TBEC EC tablet Take 325 mg by mouth every morning       furosemide (LASIX) 20 MG tablet TAKE 1 TABLET TWICE DAILY IN THE MORNING AND AFTER LUNCH 180 tablet 3    irbesartan (AVAPRO) 300 MG tablet TAKE 1 TABLET EVERY DAY 90 tablet 3    lovastatin (MEVACOR) 40 MG tablet TAKE 1 TABLET AT BEDTIME (SCHEDULE ANNUAL VISIT, NEED FASTING LABS) 90 tablet 1    methimazole (TAPAZOLE) 5 MG tablet TAKE 1 TABLET ALTERNATING WITH 1/2 TABLET EVERY OTHER DAY 66 tablet 3    Omega-3 Fatty Acids (FISH OIL) 500 MG CAPS Take 1 capsule by mouth every morning       omeprazole (PRILOSEC) 20 MG DR capsule TAKE 1 CAPSULE EVERY DAY 90 capsule 0    propranolol ER (INDERAL LA) 60 MG 24 hr capsule TAKE 1 CAPSULE EVERY DAY 90 capsule 2    RESTASIS 0.05 % ophthalmic emulsion       rivaroxaban ANTICOAGULANT (XARELTO) 15 MG TABS tablet Take 1 tablet (15 mg) by mouth daily (with dinner) 90 tablet 3    rOPINIRole (REQUIP) 0.25 MG tablet TAKE 1 TABLET IN THE AFTERNOON AND TAKE 2 TABLETS EVERY NIGHT 270 tablet 2    sertraline (ZOLOFT) 100 MG tablet TAKE 1 TABLET EVERY MORNING 90 tablet 1    traZODone (DESYREL) 50 MG tablet TAKE 1/2 TABLET AT BEDTIME 45 tablet 3     Current Facility-Administered  "Medications   Medication Dose Route Frequency Provider Last Rate Last Admin    lidocaine (XYLOCAINE) 2 % external gel   Urethral Once Justin Henry MD           /63   Pulse 79   Ht 1.473 m (4' 10\")   Wt 80.3 kg (177 lb)   SpO2 94%   BMI 36.99 kg/m      VSS  NAD  Eyes - no periorbital edema, conjunctival injection, scleral icterus  CV - RRR.   She has lymphedema and wears support stockings.  Neuro - sensation intact to monofilament on soles of feet.  DTR 2/4 biceps  Thyroid - right lobe is 2.5 in x 1 in with lateral extension into neck.  Soft with irregular borders - 2 nodules on surface on anterior and lateral aspects.  Left lobe palpable but not enlarged.  No nodules.    No LN felt in neck     Latest Ref Rng 6/22/2023  11:34 AM   ENDO THYROID LABS-UMP     TSH 0.40 - 4.00 mU/L    TSH 0.30 - 4.20 uIU/mL 2.39    Free T3 2.3 - 4.2 pg/mL    Triiodothyronine (T3) 85 - 202 ng/dL 91    FREE T4 0.90 - 1.70 ng/dL 0.84 (L)    Thyroglobulin Antibody <40 IU/mL    Thyroid Peroxidase Antibody <35 IU/mL    Thyroid Stim Immunog <=1.3 TSI index      Narrative & Impression   US THYROID 2/10/2023 12:30 PM     COMPARISON: Thyroid ultrasound 10/27/2021     HISTORY: Thyroid nodule     FINDINGS:   Thyroid parenchyma: Heterogeneous  The right lobe of the thyroid measures: 6.8 x 5.5 x 3.9 cm  The left lobe of the thyroid measures: 5.7 x 3.5 x 2.2 cm  The thyroid isthmus measures: 1.3 cm     Scattered poorly visualized nodules throughout the bilateral thyroid  lobes with scattered calcifications                                                                      Impression:  Diffusely diffusely heterogeneous with poorly visualize multi  nodularity not significantly changed relative to 10/27/2021.     ACR TI-RADS recommendations  TR2 (2 points) & TR1 (0 points) -No FNA or follow-up  TR3 (3 points) - FNA if ? 2.5cm, follow-up if 1.5 -2.4 cm in 1, 3 and  5 years  TR4 (4-6 points) - FNA if ? 1.5cm, follow-up if 1 -1.4 cm in " 1, 2, 3  and 5 years  TR5 (?7 points) - FNA if ? 1cm, follow-up if 0.5 -0.9 cm every year  for 5 years      I have personally reviewed the examination and initial interpretation  and I agree with the findings.     REBA REED MD            Assessment/Plan    This 89-year-old woman has been on methimazole to treat her Graves' disease for a few years.  Clinically she is euthyroid.  The right lobe of her thyroid has grown in size since our last exam.  It feels soft so I suspect that this is enlargement related to her ongoing autoimmune disease and/or her propensity towards thyroid nodules, which was noted earlier in her life.  I will order a thyroid ultrasound to define the anatomy.  I will check her thyroid function test today so I can adjust the methimazole dose.  I will schedule follow-up when I see the results of the ultrasound.    Dhara Meza MD

## 2024-05-28 NOTE — NURSING NOTE
"Chief Complaint   Patient presents with    Thyroid Problem     Vital signs:      BP: 121/63 Pulse: 79     SpO2: 94 %     Height: 147.3 cm (4' 10\") Weight: 80.3 kg (177 lb)  Estimated body mass index is 36.99 kg/m  as calculated from the following:    Height as of this encounter: 1.473 m (4' 10\").    Weight as of this encounter: 80.3 kg (177 lb).        "

## 2024-05-28 NOTE — PATIENT INSTRUCTIONS
Do your lab test today.    Have your thyroid ultrasound done when they can schedule it.    Will arrange follow-up when we hear about the results.    Please call and schedule at one of these locations that provide the service you need.     DEXA, CT, MRI, US, XRAY    [facility]   ([facility]) [phone]   [facility] ([place], [place]) [phone]   [facility] ([facility]) [phone]   [facility] ([facility]) [phone]

## 2024-05-30 ENCOUNTER — ANCILLARY PROCEDURE (OUTPATIENT)
Dept: ULTRASOUND IMAGING | Facility: CLINIC | Age: 89
End: 2024-05-30
Attending: INTERNAL MEDICINE
Payer: MEDICARE

## 2024-05-30 DIAGNOSIS — E05.00 GRAVES DISEASE: ICD-10-CM

## 2024-05-30 DIAGNOSIS — E04.1 THYROID NODULE: ICD-10-CM

## 2024-05-30 PROCEDURE — 76536 US EXAM OF HEAD AND NECK: CPT | Mod: GC | Performed by: RADIOLOGY

## 2024-06-02 ENCOUNTER — HEALTH MAINTENANCE LETTER (OUTPATIENT)
Age: 89
End: 2024-06-02

## 2024-06-03 ENCOUNTER — TELEPHONE (OUTPATIENT)
Dept: ENDOCRINOLOGY | Facility: CLINIC | Age: 89
End: 2024-06-03
Payer: MEDICARE

## 2024-06-03 NOTE — TELEPHONE ENCOUNTER
Left Voicemail (1st Attempt) for the patient to call back and schedule the following:    Appointment type: return endocrine    Provider: Derrick   Return date: 12/13 in [facility] slots   Specialty phone number: [phone]  Additional appointment(s) needed: NA   Additonal Notes: LVM, MyC x1   Dhara Meza MD  [id number]  Please add her to my clinic on 12/13/24    Please note that the above appointment(s) will require manual scheduling as they are marked as [facility] and will not appear using auto search. Do not schedule the patient if another patient has already been scheduled in the requested appointment slot.     Dhara Spaulding on 6/3/2024 at 11:48 AM

## 2024-06-29 ASSESSMENT — SOCIAL DETERMINANTS OF HEALTH (SDOH): HOW OFTEN DO YOU GET TOGETHER WITH FRIENDS OR RELATIVES?: MORE THAN THREE TIMES A WEEK

## 2024-07-01 DIAGNOSIS — K21.9 GASTROESOPHAGEAL REFLUX DISEASE WITHOUT ESOPHAGITIS: ICD-10-CM

## 2024-07-02 ENCOUNTER — OFFICE VISIT (OUTPATIENT)
Dept: FAMILY MEDICINE | Facility: CLINIC | Age: 89
End: 2024-07-02
Payer: MEDICARE

## 2024-07-02 VITALS
SYSTOLIC BLOOD PRESSURE: 138 MMHG | WEIGHT: 177.8 LBS | TEMPERATURE: 97.7 F | OXYGEN SATURATION: 94 % | HEIGHT: 58 IN | DIASTOLIC BLOOD PRESSURE: 82 MMHG | HEART RATE: 84 BPM | RESPIRATION RATE: 24 BRPM | BODY MASS INDEX: 37.32 KG/M2

## 2024-07-02 DIAGNOSIS — I10 HYPERTENSION GOAL BP (BLOOD PRESSURE) < 140/90: ICD-10-CM

## 2024-07-02 DIAGNOSIS — I48.91 ATRIAL FIBRILLATION WITH RAPID VENTRICULAR RESPONSE (H): ICD-10-CM

## 2024-07-02 DIAGNOSIS — Z00.00 ENCOUNTER FOR MEDICARE ANNUAL WELLNESS EXAM: Primary | ICD-10-CM

## 2024-07-02 DIAGNOSIS — H61.23 BILATERAL IMPACTED CERUMEN: ICD-10-CM

## 2024-07-02 DIAGNOSIS — F41.9 ANXIETY: ICD-10-CM

## 2024-07-02 DIAGNOSIS — G25.81 RESTLESS LEGS SYNDROME: ICD-10-CM

## 2024-07-02 PROCEDURE — G0439 PPPS, SUBSEQ VISIT: HCPCS | Performed by: FAMILY MEDICINE

## 2024-07-02 PROCEDURE — 99214 OFFICE O/P EST MOD 30 MIN: CPT | Mod: 25 | Performed by: FAMILY MEDICINE

## 2024-07-02 PROCEDURE — 69209 REMOVE IMPACTED EAR WAX UNI: CPT | Mod: 50 | Performed by: FAMILY MEDICINE

## 2024-07-02 ASSESSMENT — ASTHMA QUESTIONNAIRES
QUESTION_1 LAST FOUR WEEKS HOW MUCH OF THE TIME DID YOUR ASTHMA KEEP YOU FROM GETTING AS MUCH DONE AT WORK, SCHOOL OR AT HOME: NONE OF THE TIME
ACT_TOTALSCORE: 21
QUESTION_4 LAST FOUR WEEKS HOW OFTEN HAVE YOU USED YOUR RESCUE INHALER OR NEBULIZER MEDICATION (SUCH AS ALBUTEROL): NOT AT ALL
QUESTION_5 LAST FOUR WEEKS HOW WOULD YOU RATE YOUR ASTHMA CONTROL: COMPLETELY CONTROLLED
ACT_TOTALSCORE: 21
QUESTION_2 LAST FOUR WEEKS HOW OFTEN HAVE YOU HAD SHORTNESS OF BREATH: MORE THAN ONCE A DAY
QUESTION_3 LAST FOUR WEEKS HOW OFTEN DID YOUR ASTHMA SYMPTOMS (WHEEZING, COUGHING, SHORTNESS OF BREATH, CHEST TIGHTNESS OR PAIN) WAKE YOU UP AT NIGHT OR EARLIER THAN USUAL IN THE MORNING: NOT AT ALL

## 2024-07-02 ASSESSMENT — PAIN SCALES - GENERAL: PAINLEVEL: NO PAIN (0)

## 2024-07-02 NOTE — NURSING NOTE
Patient identified using two patient identifiers.  Ear exam showing wax occlusion completed by RN.  H202/H20 was placed in the bilateral ear(s) via irrigation tool: elephant ear.

## 2024-07-02 NOTE — PROGRESS NOTES
"Preventive Care Visit  [facility] Antonino Zapien MD, MD, Family Medicine  Jul 2, 2024      Assessment & Plan     Encounter for Medicare annual wellness exam  Seeing oncology for urothelial carcinoma.  Questioning asthma diagnosis.  Reviewed in her chart and asthma was entered by ENT but no formal diagnosis and currently asymptomatic.  We will remove the diagnosis.    Bilateral impacted cerumen  Having hearing test tomorrow.  Ear wash today.  - MT REMOVAL IMPACTED CERUMEN IRRIGATION/LVG UNILAT    Anxiety  On a stable dose of Zoloft.  Feels she is overall doing well.  Has multiple other physical comorbidity and that is the primary trigger for her anxiety.    Atrial fibrillation with rapid ventricular response (H)  With chronic systolic heart failure.  Seeing cardiology.  Doing well.  On a stable dose of diltiazem, propranolol and Xarelto    Hypertension goal BP (blood pressure) < 140/90  Blood pressure stable with current meds.    Restless leg syndrome  Using Requip without any major benefit.  We discussed at this point she can hold off as she feels her medication burden is high.  If her symptoms are worsening she will resume those.  She agreed.           BMI  Estimated body mass index is 37.16 kg/m  as calculated from the following:    Height as of this encounter: 1.473 m (4' 10\").    Weight as of this encounter: 80.6 kg (177 lb 12.8 oz).       Counseling  Appropriate preventive services were discussed with this patient, including applicable screening as appropriate for fall prevention, nutrition, physical activity, Tobacco-use cessation, weight loss and cognition.  Checklist reviewing preventive services available has been given to the patient.  Reviewed patient's diet, addressing concerns and/or questions.   Updated plan of care.  Patient reported difficulty with activities of daily living were addressed today.Information on urinary incontinence and treatment options given to " patient.           Davey Musa is a 89 year old, presenting for the following:  Medicare Visit        7/2/2024     9:01 AM   Additional Questions   Roomed by Berenice DONATO         Health Care Directive  Patient has a Health Care Directive on file  Advance care planning document is on file and is current.    HPI    Lot more nervous leg symptoms. Off and on but not just at night time.   Not sure if pills are helping at all.   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 Using compression socks during day time. Knee high. Girdle did not work well.   Does not have asthma. Not sure why she is keep getting asthma questions. Never been on asthma medication.      Recent follow up on thyroid endocrine and oncology.     Would like to have ear wax checked. Has new hearing aids. Using drops  Scheduled to see cardiology.     Will be having hearing test tomorrow. Can't hear well from right ear.     Been to GI, oncology.         6/29/2024   General Health   How would you rate your overall physical health? (!) FAIR            6/29/2024   Nutrition   Diet: Low salt            3/14/2023   Exercise   Frequency of exercise: None            6/29/2024   Social Factors   Frequency of gathering with friends or relatives More than three times a week   Worry food won't last until get money to buy more No   Food not last or not have enough money for food? No   Do you have housing? (Housing is defined as stable permanent housing and does not include staying ouside in a car, in a tent, in an abandoned building, in an overnight shelter, or couch-surfing.) Yes   Are you worried about losing your housing? No   Lack of transportation? No   Unable to get utilities (heat,electricity)? No            7/2/2024   Fall Risk   Gait Speed Test Interpretation Greater than 5.01 seconds - ABNORMAL              6/29/2024   Activities of Daily Living- Home Safety   Needs help with the following daily activites Transportation    Housework   Safety concerns  in the home None of the above       Multiple values from one day are sorted in reverse-chronological order         6/29/2024   Dental   Dentist two times every year? Yes            6/29/2024   Hearing Screening   Hearing concerns? None of the above            6/29/2024   Driving Risk Screening   Patient/family members have concerns about driving (!) DECLINE            6/29/2024   General Alertness/Fatigue Screening   Have you been more tired than usual lately? No            6/29/2024   Urinary Incontinence Screening   Bothered by leaking urine in past 6 months Yes            6/29/2024   TB Screening   Were you born outside of the [place]? No            Today's PHQ-2 Score:       7/2/2024     8:55 AM   PHQ-2 ( 1999 Pf[employer])   Q1: Little interest or pleasure in doing things 0   Q2: Feeling down, depressed or hopeless 0   PHQ-2 Score 0   Q1: Little interest or pleasure in doing things Not at all   Q2: Feeling down, depressed or hopeless Not at all   PHQ-2 Score 0           6/29/2024   Substance Use   Alcohol more than 3/day or more than 7/wk Not Applicable   Do you have a current opioid prescription? No   How severe/bad is pain from 1 to 10? 0/10 (No Pain)   Do you use any other substances recreationally? (!) PRESCRIPTION DRUGS        Social History     Tobacco Use    Smoking status: Never     Passive exposure: Never    Smokeless tobacco: Never   Vaping Use    Vaping status: Never Used   Substance Use Topics    Alcohol use: No     Alcohol/week: 0.0 standard drinks of alcohol     Comment: rare    Drug use: No                    Reviewed and updated as needed this visit by Provider                      Current providers sharing in care for this patient include:  Patient Care Team:  Pop Zapien MD as PCP - General (Family Medicine)  Vincenzo Tovar MD as MD (Family Medicine - Sports Medicine)  Shavon Bustamante PA-C as Physician Assistant (Physician Assistant)  Kenna La MD as MD  (Urology)  Cristiana Correa, RN as Registered Nurse (Urology)  Pop Zapien MD as Referring Physician (Family Practice)  Geovanna Fregoso PA as Physician Assistant (Urology)  Kenna La MD as MD (Urology)  Butch Griffith MD as MD (Clinical Cardiac Electrophysiology)  Justin Henry MD as Assigned Surgical Provider  Sd Fine MD as MD (Hematology & Oncology)  Sd Fine MD as Assigned Cancer Care Provider  Ema Tang RN as Specialty Care Coordinator (Hematology & Oncology)  Pop Zapien MD as Assigned PCP  Chandrika Camp PA-C as Physician Assistant (Cardiology)  Saira Tompkins PA-C as Physician Assistant (Physician Assistant - Medical)  Chandrika Camp PA-C as Assigned Heart and Vascular Provider  Sera Campos MD as MD (Gastroenterology)  Tamlea Freitas DO as Assigned Gastroenterology Provider    The following health maintenance items are reviewed in Epic and correct as of today:  Health Maintenance   Topic Date Due    HF ACTION PLAN  Never done    ASTHMA ACTION PLAN  09/12/2023    COVID-19 Vaccine (7 - 2023-24 season) 02/12/2024    MEDICARE ANNUAL WELLNESS VISIT  03/15/2024    INFLUENZA VACCINE (1) 09/01/2024    LIPID  11/08/2024    ANNUAL REVIEW OF HM ORDERS  11/08/2024    ALT  11/14/2024    BMP  12/18/2024    MICROALBUMIN  12/18/2024    ASTHMA CONTROL TEST  01/02/2025    CBC  03/15/2025    HEMOGLOBIN  03/15/2025    FALL RISK ASSESSMENT  07/02/2025    ADVANCE CARE PLANNING  12/19/2028    DTAP/TDAP/TD IMMUNIZATION (2 - Td or Tdap) 09/04/2029    TSH W/FREE T4 REFLEX  Completed    PHQ-2 (once per calendar year)  Completed    Pneumococcal Vaccine: 65+ Years  Completed    URINALYSIS  Completed    ZOSTER IMMUNIZATION  Completed    RSV VACCINE (Pregnancy & 60+)  Completed    IPV IMMUNIZATION  Aged Out    HPV IMMUNIZATION  Aged Out    MENINGITIS IMMUNIZATION  Aged Out    RSV MONOCLONAL ANTIBODY  Aged Out    MAMMO SCREENING  Discontinued  "           Objective    Exam  /82 (BP Location: Right arm, Patient Position: Sitting, Cuff Size: Adult Regular)   Pulse 84   Temp 97.7  F (36.5  C) (Temporal)   Resp 24   Ht 1.473 m (4' 10\")   Wt 80.6 kg (177 lb 12.8 oz)   SpO2 94%   BMI 37.16 kg/m     Estimated body mass index is 37.16 kg/m  as calculated from the following:    Height as of this encounter: 1.473 m (4' 10\").    Weight as of this encounter: 80.6 kg (177 lb 12.8 oz).    Physical Exam  GENERAL: alert and no distress  EYES: Eyes grossly normal to inspection, PERRL and conjunctivae and sclerae normal  HENT: ear canals L>R ear wax present, nose and mouth without ulcers or lesions  NECK: no adenopathy, no asymmetry, masses, or scars  RESP: lungs clear to auscultation - no rales, rhonchi or wheezes  CV: regular rate and rhythm, normal S1 S2, no S3 or S4, no murmur, click or rub, no peripheral edema  MS: no gross musculoskeletal defects noted, bilateral non pitting edema  SKIN: no suspicious lesions or rashes on visible parts   NEURO: Normal strength and tone, mentation intact and speech normal  PSYCH: mentation appears normal, affect normal/bright        7/2/2024   Mini Cog   Clock Draw Score 2 Normal   3 Item Recall 3 objects recalled   Mini Cog Total Score 5                 Signed Electronically by: Pop Zapien MD, MD    "

## 2024-07-02 NOTE — PATIENT INSTRUCTIONS
"OK to hold off on Requip (rOPINIRole )    Patient Education   Preventive Care Advice   This is general advice we often give to help people stay healthy. Your care team may have specific advice just for you. Please talk to your care team about your own preventive care needs.  Lifestyle  Exercise at least 150 minutes each week (30 minutes a day, 5 days a week).  Do muscle strengthening activities 2 days a week. These help control your weight and prevent disease.  No smoking.  Wear sunscreen to prevent skin cancer.  Have your home tested for radon every 2 to 5 years. Radon is a colorless, odorless gas that can harm your lungs. To learn more, go to www.health.[facility].mn.us and search for \"Radon in Homes.\"  Keep guns unloaded and locked up in a safe place like a safe or gun vault, or, use a gun lock and hide the keys. Always lock away bullets separately. To learn more, visit [facility].[place].gov and search for \"safe gun storage.\"  Nutrition  Eat 5 or more servings of fruits and vegetables each day.  Try wheat bread, brown rice and whole grain pasta (instead of white bread, rice, and pasta).  Get enough calcium and vitamin D. Check the label on foods and aim for 100% of the RDA (recommended daily allowance).  Regular exams  Have a dental exam and cleaning every 6 months.  See your health care team every year to talk about:  Any changes in your health.  Any medicines your care team has prescribed.  Preventive care, family planning, and ways to prevent chronic diseases.  Shots (vaccines)   HPV shots (up to age 26), if you've never had them before.  Hepatitis B shots (up to age 59), if you've never had them before.  COVID-19 shot: Get this shot when it's due.  Flu shot: Get a flu shot every year.  Tetanus shot: Get a tetanus shot every 10 years.  Pneumococcal, hepatitis A, and RSV shots: Ask your care team if you need these based on your risk.  Shingles shot (for age 50 and up).  General health tests  Diabetes screening:  Starting at " age 35, Get screened for diabetes at least every 3 years.  If you are younger than age 35, ask your care team if you should be screened for diabetes.  Cholesterol test: At age 39, start having a cholesterol test every 5 years, or more often if advised.  Bone density scan (DEXA): At age 50, ask your care team if you should have this scan for osteoporosis (brittle bones).  Hepatitis C: Get tested at least once in your life.  Abdominal aortic aneurysm screening: Talk to your doctor about having this screening if you:  Have ever smoked; and  Are biologically male; and  Are between the ages of 65 and 75.  STIs (sexually transmitted infections)  Before age 24: Ask your care team if you should be screened for STIs.  After age 24: Get screened for STIs if you're at risk. You are at risk for STIs (including HIV) if:  You are sexually active with more than one person.  You don't use condoms every time.  You or a partner was diagnosed with a sexually transmitted infection.  If you are at risk for HIV, ask about PrEP medicine to prevent HIV.  Get tested for HIV at least once in your life, whether you are at risk for HIV or not.  Cancer screening tests  Cervical cancer screening: If you have a cervix, begin getting regular cervical cancer screening tests at age 21. Most people who have regular screenings with normal results can stop after age 65. Talk about this with your provider.  Breast cancer scan (mammogram): If you've ever had breasts, begin having regular mammograms starting at age 40. This is a scan to check for breast cancer.  Colon cancer screening: It is important to start screening for colon cancer at age 45.  Have a colonoscopy test every 10 years (or more often if you're at risk) Or, ask your provider about stool tests like a FIT test every year or Cologuard test every 3 years.  To learn more about your testing options, visit: www.[employer]/504551.pdf.  For help making a decision, visit: becca/yp74548.  Prostate  cancer screening test: If you have a prostate and are age 55 to 69, ask your provider if you would benefit from a yearly prostate cancer screening test.  Lung cancer screening: If you are a current or former smoker age 50 to 80, ask your care team if ongoing lung cancer screenings are right for you.  For informational purposes only. Not to replace the advice of your health care provider. Copyright   2023 [facility]. All rights reserved. Clinically reviewed by the [facility] Transitions Program. [employer] 685794 - REV 04/24.  Learning About Activities of Daily Living  What are activities of daily living?     Activities of daily living (ADLs) are the basic self-care tasks you do every day. These include eating, bathing, dressing, and moving around.  As you age, and if you have health problems, you may find that it's harder to do some of these tasks. If so, your doctor can suggest ideas that may help.  To measure what kind of help you may need, your doctor will ask how well you are able to do ADLs. Let your doctor know if there are any tasks that you are having trouble doing. This is an important first step to getting help. And when you have the help you need, you can stay as independent as possible.  How will a doctor assess your ADLs?  Asking about ADLs is part of a routine health checkup your doctor will likely do as you age. Your health check might be done in a doctor's office, in your home, or at a hospital. The goal is to find out if you are having any problems that could make it hard to care for yourself or that make it unsafe for you to be on your own.  To measure your ADLs, your doctor will ask how hard it is for you to do routine tasks. Your doctor may also want to know if you have changed the way you do a task because of a health problem. Your doctor may watch how you:  Walk back and forth.  Keep your balance while you stand or walk.  Move from sitting to standing or from a bed to a  chair.  Button or unbutton a shirt or sweater.  Remove and put on your shoes.  It's common to feel a little worried or anxious if you find you can't do all the things you used to be able to do. Talking with your doctor about ADLs is a way to make sure you're as safe as possible and able to care for yourself as well as you can. You may want to bring a caregiver, friend, or family member to your checkup. They can help you talk to your doctor.  Follow-up care is a key part of your treatment and safety. Be sure to make and go to all appointments, and call your doctor if you are having problems. It's also a good idea to know your test results and keep a list of the medicines you take.  Current as of: October 24, 2023               Content Version: 14.0    5540-9401 [employer].   Care instructions adapted under license by your healthcare professional. If you have questions about a medical condition or this instruction, always ask your healthcare professional. [employer] disclaims any warranty or liability for your use of this information.      Preventing Falls: Care Instructions  Injuries and health problems such as trouble walking or poor eyesight can increase your risk of falling. So can some medicines. But there are things you can do to help prevent falls. You can exercise to get stronger. You can also arrange your home to make it safer.    Talk to your doctor about the medicines you take. Ask if any of them increase the risk of falls and whether they can be changed or stopped.   Try to exercise regularly. It can help improve your strength and balance. This can help lower your risk of falling.     Practice fall safety and prevention.    Wear low-heeled shoes that fit well and give your feet good support. Talk to your doctor if you have foot problems that make this hard.  Carry a cellphone or wear a medical alert device that you can use to call for help.  Use stepladders instead of chairs to  "reach high objects. Don't climb if you're at risk for falls. Ask for help, if needed.  Wear the correct eyeglasses, if you need them.    Make your home safer.    Remove rugs, cords, clutter, and furniture from walkways.  Keep your house well lit. Use night-lights in hallways and bathrooms.  Install and use sturdy handrails on stairways.  Wear nonskid footwear, even inside. Don't walk barefoot or in socks without shoes.    Be safe outside.    Use handrails, curb cuts, and ramps whenever possible.  Keep your hands free by using a shoulder bag or backpack.  Try to walk in well-lit areas. Watch out for uneven ground, changes in pavement, and debris.  Be careful in the winter. Walk on the grass or gravel when sidewalks are slippery. Use de-icer on steps and walkways. Add non-slip devices to shoes.    Put grab bars and nonskid mats in your shower or tub and near the toilet. Try to use a shower chair or bath bench when bathing.   Get into a tub or shower by putting in your weaker leg first. Get out with your strong side first. Have a phone or medical alert device in the bathroom with you.   Where can you learn more?  Go to https://www.[employer].net/patiented  Enter G117 in the search box to learn more about \"Preventing Falls: Care Instructions.\"  Current as of: July 17, 2023               Content Version: 14.0    8250-4266 [employer].   Care instructions adapted under license by your healthcare professional. If you have questions about a medical condition or this instruction, always ask your healthcare professional. [employer] disclaims any warranty or liability for your use of this information.      Bladder Training: Care Instructions  Your Care Instructions     Bladder training is used to treat urge incontinence and stress incontinence. Urge incontinence means that the need to urinate comes on so fast that you can't get to a toilet in time. Stress incontinence means that you leak urine " because of pressure on your bladder. For example, it may happen when you laugh, cough, or lift something heavy.  Bladder training can increase how long you can wait before you have to urinate. It can also help your bladder hold more urine. And it can give you better control over the urge to urinate.  It is important to remember that bladder training takes a few weeks to a few months to make a difference. You may not see results right away, but don't give up.  Follow-up care is a key part of your treatment and safety. Be sure to make and go to all appointments, and call your doctor if you are having problems. It's also a good idea to know your test results and keep a list of the medicines you take.  How can you care for yourself at home?  Work with your doctor to come up with a bladder training program that is right for you. You may use one or more of the following methods.  Delayed urination  In the beginning, try to keep from urinating for 5 minutes after you first feel the need to go.  While you wait, take deep, slow breaths to relax. Kegel exercises can also help you delay the need to go to the bathroom.  After some practice, when you can easily wait 5 minutes to urinate, try to wait 10 minutes before you urinate.  Slowly increase the waiting period until you are able to control when you have to urinate.  Scheduled urination  Empty your bladder when you first wake up in the morning.  Schedule times throughout the day when you will urinate.  Start by going to the bathroom every hour, even if you don't need to go.  Slowly increase the time between trips to the bathroom.  When you have found a schedule that works well for you, keep doing it.  If you wake up during the night and have to urinate, do it. Apply your schedule to waking hours only.  Kegel exercises  These tighten and strengthen pelvic muscles, which can help you control the flow of urine. (If doing these exercises causes pain, stop doing them and talk  "with your doctor.) To do Kegel exercises:  Squeeze your muscles as if you were trying not to pass gas. Or squeeze your muscles as if you were stopping the flow of urine. Your belly, legs, and buttocks shouldn't move.  Hold the squeeze for 3 seconds, then relax for 5 to 10 seconds.  Start with 3 seconds, then add 1 second each week until you are able to squeeze for 10 seconds.  Repeat the exercise 10 times a session. Do 3 to 8 sessions a day.  When should you call for help?  Watch closely for changes in your health, and be sure to contact your doctor if:    Your incontinence is getting worse.     You do not get better as expected.   Where can you learn more?  Go to https://www.[employer].net/patiented  Enter V684 in the search box to learn more about \"Bladder Training: Care Instructions.\"  Current as of: November 15, 2023               Content Version: 14.0    9674-5185 [employer].   Care instructions adapted under license by your healthcare professional. If you have questions about a medical condition or this instruction, always ask your healthcare professional. [employer], [employer] disclaims any warranty or liability for your use of this information.     "

## 2024-07-03 PROBLEM — M17.11 PRIMARY OSTEOARTHRITIS OF RIGHT KNEE: Status: ACTIVE | Noted: 2019-09-25

## 2024-07-03 PROBLEM — E66.9 OBESITY, UNSPECIFIED OBESITY SEVERITY, UNSPECIFIED OBESITY TYPE: Status: RESOLVED | Noted: 2017-02-22 | Resolved: 2024-07-03

## 2024-07-03 PROBLEM — I48.91 ATRIAL FIBRILLATION WITH RAPID VENTRICULAR RESPONSE (H): Status: ACTIVE | Noted: 2018-01-16

## 2024-07-19 DIAGNOSIS — M85.80 OSTEOPENIA, UNSPECIFIED LOCATION: ICD-10-CM

## 2024-07-22 RX ORDER — ALENDRONATE SODIUM 70 MG/1
TABLET ORAL
Qty: 12 TABLET | Refills: 3 | Status: SHIPPED | OUTPATIENT
Start: 2024-07-22

## 2024-08-25 ENCOUNTER — ANCILLARY PROCEDURE (OUTPATIENT)
Dept: GENERAL RADIOLOGY | Facility: CLINIC | Age: 89
End: 2024-08-25
Attending: NURSE PRACTITIONER
Payer: MEDICARE

## 2024-08-25 ENCOUNTER — OFFICE VISIT (OUTPATIENT)
Dept: URGENT CARE | Facility: URGENT CARE | Age: 89
End: 2024-08-25
Payer: MEDICARE

## 2024-08-25 VITALS
TEMPERATURE: 97.3 F | HEART RATE: 78 BPM | SYSTOLIC BLOOD PRESSURE: 147 MMHG | OXYGEN SATURATION: 95 % | RESPIRATION RATE: 14 BRPM | DIASTOLIC BLOOD PRESSURE: 75 MMHG

## 2024-08-25 DIAGNOSIS — R06.02 SOB (SHORTNESS OF BREATH): Primary | ICD-10-CM

## 2024-08-25 DIAGNOSIS — M89.8X1 PAIN OF RIGHT SCAPULA: ICD-10-CM

## 2024-08-25 DIAGNOSIS — S29.012A MUSCLE STRAIN OF RIGHT UPPER BACK, INITIAL ENCOUNTER: ICD-10-CM

## 2024-08-25 DIAGNOSIS — R06.02 SOB (SHORTNESS OF BREATH): ICD-10-CM

## 2024-08-25 PROCEDURE — 71046 X-RAY EXAM CHEST 2 VIEWS: CPT | Mod: TC | Performed by: RADIOLOGY

## 2024-08-25 PROCEDURE — 99214 OFFICE O/P EST MOD 30 MIN: CPT | Performed by: NURSE PRACTITIONER

## 2024-08-25 NOTE — PROGRESS NOTES
Chief Complaint   Patient presents with    Urgent Care     Patient presents with right upper back pain that is making her short of breath.      SUBJECTIVE:  Dimple Oviedo is a 89 year old female presenting with right posterior back pain over the last 3 days.  She feels the pain is more noticeable with arm movements position changes and when taking deep breaths.  This makes her feel short of breath.  Symptoms started after working on photo collage and heavy lifting.  It feels better to massage the area.  No cough mucus recent URI numbness tingling fevers sweats chills palpitations dizziness.  She does have a relevant past medical history of heart failure NSTEMI A-fib on Xarelto.  She never misses doses of her medicine.    SpO2 Readings from Last 6 Encounters:   08/25/24 95%   07/02/24 94%   05/28/24 94%   03/21/24 96%   12/18/23 97%   12/07/23 94%     Past Medical History:   Diagnosis Date    CRESENCIO (acute kidney injury) (H24) 9/11/2018    Arthritis     Asthma 2/9/2022    Calculus of kidney     Chemotherapy-induced neutropenia (H24) 3/6/2019    Congestive heart failure (H)     Esophageal reflux     GERD (gastroesophageal reflux disease)     Hyperlipidemia LDL goal <130 5/9/2010    Malignant melanoma of skin of trunk, except scrotum (H)     Nonspecific abnormal finding     has living will 2004 -     Nontoxic multinodular goiter     no further eval /tx rec per pt    Osteopenia     Other psoriasis     Personal history of colonic polyps     PMR (polymyalgia rheumatica) (H24)     Restless legs syndrome 10/12/2005    Stress-induced cardiomyopathy     Undiagnosed cardiac murmurs     Unspecified constipation     Unspecified essential hypertension     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 12 tablet 3    diltiazem ER (DILT-XR) 180 MG 24 hr capsule  Take 1 capsule (180 mg) by mouth daily 90 capsule 3    ferrous sulfate 325 (65 Fe) MG TBEC EC tablet Take 325 mg by mouth every morning       furosemide (LASIX) 20 MG tablet TAKE 1 TABLET TWICE DAILY IN THE MORNING AND AFTER LUNCH 180 tablet 3    irbesartan (AVAPRO) 300 MG tablet TAKE 1 TABLET EVERY DAY 90 tablet 3    lovastatin (MEVACOR) 40 MG tablet TAKE 1 TABLET AT BEDTIME (SCHEDULE ANNUAL VISIT, NEED FASTING LABS) 90 tablet 1    methimazole (TAPAZOLE) 5 MG tablet TAKE 1 TABLET ALTERNATING WITH 1/2 TABLET EVERY OTHER DAY 66 tablet 3    Omega-3 Fatty Acids (FISH OIL) 500 MG CAPS Take 1 capsule by mouth every morning       omeprazole (PRILOSEC) 20 MG DR capsule TAKE 1 CAPSULE EVERY DAY 90 capsule 1    propranolol ER (INDERAL LA) 60 MG 24 hr capsule TAKE 1 CAPSULE EVERY DAY 90 capsule 2    RESTASIS 0.05 % ophthalmic emulsion       rivaroxaban ANTICOAGULANT (XARELTO) 15 MG TABS tablet Take 1 tablet (15 mg) by mouth daily (with dinner) 90 tablet 3    sertraline (ZOLOFT) 100 MG tablet TAKE 1 TABLET EVERY MORNING 90 tablet 1    traZODone (DESYREL) 50 MG tablet TAKE 1/2 TABLET AT BEDTIME 45 tablet 3     Current Facility-Administered Medications   Medication Dose Route Frequency Provider Last Rate Last Admin    lidocaine (XYLOCAINE) 2 % external gel   Urethral Once Justin Henry MD         Social History     Tobacco Use    Smoking status: Never     Passive exposure: Never    Smokeless tobacco: Never   Substance Use Topics    Alcohol use: No     Alcohol/week: 0.0 standard drinks of alcohol     Comment: rare     No Known Allergies    Review of Systems  All systems negative except for those listed above in HPI.    OBJECTIVE:   BP (!) 147/75 (BP Location: Right arm)   Pulse 78   Temp 97.3  F (36.3  C) (Temporal)   Resp 14   SpO2 95%   Physical Exam  Vitals reviewed.   Constitutional:       General: She is not in acute distress.     Appearance: Normal appearance. She is well-developed. She is not ill-appearing,  toxic-appearing or diaphoretic.   HENT:      Head: Normocephalic and atraumatic.      Mouth/Throat:      Mouth: Mucous membranes are moist.      Pharynx: Oropharynx is clear.   Eyes:      Conjunctiva/sclera: Conjunctivae normal.      Pupils: Pupils are equal, round, and reactive to light.   Cardiovascular:      Rate and Rhythm: Normal rate.      Pulses: Normal pulses.      Heart sounds: Normal heart sounds. No murmur heard.     No friction rub. No gallop.   Pulmonary:      Effort: Pulmonary effort is normal. No respiratory distress.      Breath sounds: Normal breath sounds. No stridor. No wheezing, rhonchi or rales.   Chest:      Chest wall: No tenderness.   Musculoskeletal:         General: No swelling, deformity or signs of injury. Tenderness: right scapula feels improved with massage pressure applied.Normal range of motion.      Cervical back: Normal range of motion and neck supple. No tenderness.   Lymphadenopathy:      Cervical: No cervical adenopathy.   Skin:     General: Skin is warm.      Capillary Refill: Capillary refill takes less than 2 seconds.      Coloration: Skin is not jaundiced or pale.      Findings: No rash.   Neurological:      Mental Status: She is alert and oriented to person, place, and time.      Motor: No weakness.      Gait: Gait normal.   Psychiatric:         Mood and Affect: Mood normal.         Behavior: Behavior normal.       Chest x-ray done in clinic read by me as negative for acute cardiopulmonary abnormality.  Stable cardiomegaly.  No change since last.  Results for orders placed or performed in visit on 08/25/24   XR Chest 2 Views     Status: None    Narrative    EXAM: XR CHEST 2 VIEWS  LOCATION: [facility]  DATE: 8/25/2024    INDICATION: Right posterior back pain. Shortness of breath.   COMPARISON: Chest radiograph 10/10/2021. Chest CT 3/15/2024.       Impression    IMPRESSION:     Mild cardiomegaly with normal pulmonary vascularity. No focal airspace  opacities, pleural effusions, or pneumothorax. Aortic atherosclerotic calcifications.    Diffusely demineralized bones. Mildly exaggerated thoracic kyphosis with multilevel degenerative changes of the spine.    Probable small hiatal hernia.    Cholecystectomy clips.     ASSESSMENT:    ICD-10-CM    1. SOB (shortness of breath)  R06.02 XR Chest 2 Views      2. Pain of right scapula  M89.8X1       3. Muscle strain of right upper back, initial encounter  S29.012A         PLAN:     Right posterior back scapular pain / muscle strain  Musculoskeletal in nature given worse with movements and improved with massage  No cardiopulmonary red flags endorsed, PERC and HEART scores considered  Rest ice heat massage stretch Tylenol Lidoderm patches Voltaren gel  X-ray done in clinic negative for acute cardiopulmonary concern, no change since last  Urgent care limited without advanced imaging such as CT scan echo obs  If any new worsening symptoms please present to the ER  Otherwise, PCP if lingering    Follow up with primary care provider with any problems, questions or concerns or if symptoms worsen or fail to improve. Patient agreed to plan and verbalized understanding.    Sade Rowland, TAQUERIAP-BC  [facility] [place]

## 2024-08-25 NOTE — PATIENT INSTRUCTIONS
Right posterior back scapular pain  Musculoskeletal in nature given worse with movements and improved with massage  Rest ice heat massage stretch Tylenol Lidoderm patches Voltaren gel  X-ray done in clinic negative for acute cardiopulmonary concern, no change since last  Urgent care limited without advanced imaging such as CT scan  If any new worsening symptoms please present to the ER

## 2024-09-04 DIAGNOSIS — F41.1 GAD (GENERALIZED ANXIETY DISORDER): ICD-10-CM

## 2024-09-04 RX ORDER — SERTRALINE HYDROCHLORIDE 100 MG/1
100 TABLET, FILM COATED ORAL EVERY MORNING
Qty: 90 TABLET | Refills: 1 | Status: SHIPPED | OUTPATIENT
Start: 2024-09-04

## 2024-09-04 NOTE — TELEPHONE ENCOUNTER
Provider review required. Failed RN protocol for most recent THERON-7 completed >6 months ago.        9/4/2019     2:07 PM 5/2/2022     2:41 PM 9/12/2022     9:55 AM   THERON-7 SCORE   Total Score  0 (minimal anxiety) 0 (minimal anxiety)   Total Score 0 0 0

## 2024-09-13 ENCOUNTER — LAB (OUTPATIENT)
Dept: LAB | Facility: CLINIC | Age: 89
End: 2024-09-13
Payer: MEDICARE

## 2024-09-13 ENCOUNTER — ANCILLARY PROCEDURE (OUTPATIENT)
Dept: CT IMAGING | Facility: CLINIC | Age: 89
End: 2024-09-13
Payer: MEDICARE

## 2024-09-13 ENCOUNTER — NURSE TRIAGE (OUTPATIENT)
Dept: ONCOLOGY | Facility: CLINIC | Age: 89
End: 2024-09-13

## 2024-09-13 DIAGNOSIS — C66.1 UROTHELIAL CARCINOMA OF RIGHT DISTAL URETER (H): ICD-10-CM

## 2024-09-13 DIAGNOSIS — C68.9 UROTHELIAL CARCINOMA (H): Primary | ICD-10-CM

## 2024-09-13 DIAGNOSIS — R91.8 PULMONARY NODULES: ICD-10-CM

## 2024-09-13 DIAGNOSIS — C68.9 UROTHELIAL CARCINOMA (H): ICD-10-CM

## 2024-09-13 DIAGNOSIS — E05.00 GRAVES DISEASE: ICD-10-CM

## 2024-09-13 LAB
CREAT BLD-MCNC: 1.1 MG/DL (ref 0.5–1)
CREAT SERPL-MCNC: 1.01 MG/DL (ref 0.51–0.95)
EGFRCR SERPLBLD CKD-EPI 2021: 47 ML/MIN/1.73M2
EGFRCR SERPLBLD CKD-EPI 2021: 52 ML/MIN/1.73M2

## 2024-09-13 PROCEDURE — 88120 CYTP URNE 3-5 PROBES EA SPEC: CPT | Mod: 26 | Performed by: PATHOLOGY

## 2024-09-13 PROCEDURE — G1010 CDSM STANSON: HCPCS | Performed by: STUDENT IN AN ORGANIZED HEALTH CARE EDUCATION/TRAINING PROGRAM

## 2024-09-13 PROCEDURE — 88112 CYTOPATH CELL ENHANCE TECH: CPT | Mod: TC

## 2024-09-13 PROCEDURE — 88120 CYTP URNE 3-5 PROBES EA SPEC: CPT | Mod: TC

## 2024-09-13 PROCEDURE — 74177 CT ABD & PELVIS W/CONTRAST: CPT | Mod: MG | Performed by: STUDENT IN AN ORGANIZED HEALTH CARE EDUCATION/TRAINING PROGRAM

## 2024-09-13 PROCEDURE — 36415 COLL VENOUS BLD VENIPUNCTURE: CPT | Performed by: PATHOLOGY

## 2024-09-13 PROCEDURE — 71260 CT THORAX DX C+: CPT | Mod: MG | Performed by: STUDENT IN AN ORGANIZED HEALTH CARE EDUCATION/TRAINING PROGRAM

## 2024-09-13 PROCEDURE — 88112 CYTOPATH CELL ENHANCE TECH: CPT | Mod: 26 | Performed by: PATHOLOGY

## 2024-09-13 PROCEDURE — 80053 COMPREHEN METABOLIC PANEL: CPT | Performed by: PATHOLOGY

## 2024-09-13 RX ORDER — IOPAMIDOL 755 MG/ML
89 INJECTION, SOLUTION INTRAVASCULAR ONCE
Status: COMPLETED | OUTPATIENT
Start: 2024-09-13 | End: 2024-09-13

## 2024-09-13 RX ADMIN — IOPAMIDOL 89 ML: 755 INJECTION, SOLUTION INTRAVASCULAR at 11:24

## 2024-09-13 NOTE — DISCHARGE INSTRUCTIONS

## 2024-09-13 NOTE — TELEPHONE ENCOUNTER
"No from [facility] core lab called, need clarification lab orders    1115 Secure chat sent to provider Maral Johnson PA-C, last provider to evaluate pt.     Per Maral pt needs Urine and Creatinine, no additional blood work needed.     1120 This writer update Mao , Joseph clarified that the type of creatinine order originally entered \"Creatinine POCT\" is not the type of lab first floor lab can do and they cannot see the order\"     Maral reentered Creatine lab order that first floor lab is able to do.   "

## 2024-09-17 ENCOUNTER — ONCOLOGY VISIT (OUTPATIENT)
Dept: ONCOLOGY | Facility: CLINIC | Age: 89
End: 2024-09-17
Attending: INTERNAL MEDICINE
Payer: MEDICARE

## 2024-09-17 ENCOUNTER — OFFICE VISIT (OUTPATIENT)
Dept: CARDIOLOGY | Facility: CLINIC | Age: 89
End: 2024-09-17
Payer: MEDICARE

## 2024-09-17 VITALS
DIASTOLIC BLOOD PRESSURE: 81 MMHG | HEART RATE: 73 BPM | BODY MASS INDEX: 37.39 KG/M2 | OXYGEN SATURATION: 94 % | WEIGHT: 178.9 LBS | SYSTOLIC BLOOD PRESSURE: 134 MMHG

## 2024-09-17 VITALS
BODY MASS INDEX: 37.2 KG/M2 | WEIGHT: 178 LBS | HEART RATE: 73 BPM | OXYGEN SATURATION: 94 % | SYSTOLIC BLOOD PRESSURE: 134 MMHG | DIASTOLIC BLOOD PRESSURE: 81 MMHG

## 2024-09-17 DIAGNOSIS — R91.8 PULMONARY NODULES: ICD-10-CM

## 2024-09-17 DIAGNOSIS — I27.20 MILD PULMONARY HYPERTENSION (H): ICD-10-CM

## 2024-09-17 DIAGNOSIS — M79.89 SWELLING OF LOWER EXTREMITY: Primary | ICD-10-CM

## 2024-09-17 DIAGNOSIS — E05.00 GRAVES DISEASE: ICD-10-CM

## 2024-09-17 DIAGNOSIS — C66.1 UROTHELIAL CARCINOMA OF RIGHT DISTAL URETER (H): Primary | ICD-10-CM

## 2024-09-17 DIAGNOSIS — I50.22 CHRONIC SYSTOLIC HEART FAILURE (H): ICD-10-CM

## 2024-09-17 DIAGNOSIS — I48.20 CHRONIC ATRIAL FIBRILLATION (H): ICD-10-CM

## 2024-09-17 LAB
ALBUMIN SERPL BCG-MCNC: 4.1 G/DL (ref 3.5–5.2)
ALP SERPL-CCNC: 98 U/L (ref 40–150)
ALT SERPL W P-5'-P-CCNC: 31 U/L (ref 0–50)
ANION GAP SERPL CALCULATED.3IONS-SCNC: 13 MMOL/L (ref 7–15)
AST SERPL W P-5'-P-CCNC: 41 U/L (ref 0–45)
BILIRUB SERPL-MCNC: 0.6 MG/DL
BUN SERPL-MCNC: 20.2 MG/DL (ref 8–23)
CALCIUM SERPL-MCNC: 9.2 MG/DL (ref 8.8–10.4)
CHLORIDE SERPL-SCNC: 99 MMOL/L (ref 98–107)
CREAT SERPL-MCNC: 1.01 MG/DL (ref 0.51–0.95)
EGFRCR SERPLBLD CKD-EPI 2021: 52 ML/MIN/1.73M2
GLUCOSE SERPL-MCNC: 81 MG/DL (ref 70–99)
HCO3 SERPL-SCNC: 23 MMOL/L (ref 22–29)
POTASSIUM SERPL-SCNC: 5 MMOL/L (ref 3.4–5.3)
PROT SERPL-MCNC: 6.9 G/DL (ref 6.4–8.3)
SODIUM SERPL-SCNC: 135 MMOL/L (ref 135–145)

## 2024-09-17 PROCEDURE — 99214 OFFICE O/P EST MOD 30 MIN: CPT

## 2024-09-17 PROCEDURE — 93005 ELECTROCARDIOGRAM TRACING: CPT

## 2024-09-17 PROCEDURE — G0463 HOSPITAL OUTPT CLINIC VISIT: HCPCS

## 2024-09-17 PROCEDURE — G2211 COMPLEX E/M VISIT ADD ON: HCPCS | Performed by: INTERNAL MEDICINE

## 2024-09-17 PROCEDURE — 99214 OFFICE O/P EST MOD 30 MIN: CPT | Performed by: INTERNAL MEDICINE

## 2024-09-17 PROCEDURE — G0463 HOSPITAL OUTPT CLINIC VISIT: HCPCS | Mod: 27 | Performed by: INTERNAL MEDICINE

## 2024-09-17 ASSESSMENT — PAIN SCALES - GENERAL
PAINLEVEL: NO PAIN (0)
PAINLEVEL: NO PAIN (0)

## 2024-09-17 NOTE — LETTER
9/17/2024      Dimple Oviedo  [address]  [facility] [address]      Dear Colleague,    Thank you for referring your patient, Dimple Oviedo, to the [facility]. Please see a copy of my visit note below.      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    HISTORY OF PRESENT ILLNESS:  Ms. 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 Her oncologic history is as under.    Ms. Oviedo is doing well. She denies any complains suggestive of recurrent disease. She had covid at the beginning of the month with a cough. She is feeling better now. She has no new pains in her body. No hematuria. No fevers or chills. No chest pain or shortness of breath.     She is accompanied by her son Issa.      ONCOLOGIC HISTORY:  1. 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   - Jan 2018 to September 2018- Persistent gross hematuria and underwent multiple procedures.    - 2/19/18 and 4/3/18- cystoscopies with bladder biopsies  which were negative for bladder tumor  - 1/17/18, 3/8/18, 7/26/18, 9/10/18- Multiple urine cytologies have come back positive by FISH for high-grade transitional cell carcinoma  - 9/10/18- Underwent a bilateral cystoureteroscopy with biopsy and brushing that showed  right upper tract cytology suspicious for high-grade urothelial ca. Right ureter brushing negative from that day. Left upper tract negative.  - 9/10/18- CT A/P without contrast showed new small bilateral pleural effusions and interstitial pulmonary edema but no evidence of locoregional or metastatic disease.  -  "9/12/18- Presented to ED with oliguria, CRESENCIO, and mild hydronephrosis bilaterally on CT scan and underwent bilateral ureteral stent placment  - 11/13/2018- Right robotic nephroureterectomy, excision of ana-caval mass and LN excision by Stuart King. Pathology showed \"Right nephroureterectomy: Invasive high grade urothelial carcinoma measuring 3.5 cm, located in renal pelvis and invading through renal parenchyma into perinephric fat. Urothelial carcinoma in-situ present. Margins free of tumor. Ana-caval mass: Metastatic urothelial carcinoma involving one lymph node with at least 1 cm tumor deposit. Pericaval Extranodal Extension present. Five additional benign pericaval lymph nodes. Pathologic stage: pT4N1 (1 of 6 lymph nodes).\"  - 12/11/18- PET/CT whole body demonstrated significant residual FDG avid disease. For example, \"there is an FDG avid ill-defined pericaval mass immediately medial to the surgical clips measuring approximately 2.0 x 1.4 cm, with max SUV measuring up to12.2, see series 4 image 21. Additional FDG avid retrocaval and interaortocaval lymphadenopathy are noted.There is a 1.2 cm nodule in the right hemipelvis posterior lateral tothe bladder with max SUV measuring 8.3, see series 4 image 77. The bladder is incompletely distended.\" No suspicious thoracic or bone uptake.  - 12/12/18- Started adjuvant chemotherapy (carbo+gem) per POUT trial. Cycle 2 - 1/2/19. Cycle 3 - 1/23/19. Cycle 4 - 02/13/19.  - 1/22/19 - CT C/A/P with contrast compared with prior PET/CT: \"1. New well-circumscribed soft tissue pulmonary nodules of the right lower lobe and left upper lobe. Findings could be infectious, inflammatory, or represent metastatic disease. Continued attention on follow-up recommended. Postoperative changes of right nephrectomy. No evidence for local recurrence in the present study.\"  - 3/1/2019- CT C/A/P with contrast - \"1. Bilateral pulmonary nodules measuring up to 4 mm in the right lower lobe, " "previously measuring 8 mm. No new or enlarging pulmonary nodules. 2. No convincing evidence for metastatic disease in the abdomen, pelvis and bones.\"  - 6/3/2019- CT C/A/P with contrast - \"1. Surgical changes of right nephrectomy without evidence of residual or recurrent disease on this noncontrast exam.  Consider contrast enhanced examination on follow-up. 2. No evidence of metastatic disease in the abdomen, or pelvis on noncontrast CT.  Scattered tiny pulmonary nodules in the chest are not significantly changed.  Previously described prominent right lower lobe pulmonary nodule (previously measuring up to 8 mm) is no longer visualized. 3. Stable bilateral pulmonary nodules measuring maximally 4 mm.\"  - 09/11/19: CT C/A/P without contrast - \"1. Stable exam without evidence convincing for new disease. 2. Stable pulmonary nodules. 3. 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 4. Stable heterogeneous enlargement of the thyroid with underlying nodules suggested.\"  - 12/4/19: CT C/A/P without contrast - \"No acute change since prior exam. No evidence of recurrent or metastatic disease. Tiny lung nodules are stable. Prior right nephrectomy. Left renal artery aneurysm is stable.\"  - 04/08/20, 7/27/20: CT C/A/P with contrast - GABRIELLE.  - 01/12/21: CT C/A/P with contrast - \"1.  Slight increased size of a 6 mm left upper lobe nodule and new 4 mm linear opacity along the right major fissure. These are indeterminate but could represent progression of metastatic disease. 2.  Slight increased size of mildly enlarged mediastinal and hilar lymph nodes. 3.  Mild pulmonary edema. 4.  No recurrent or metastatic disease in the abdomen and pelvis. 5.  Mild stranding around the urinary bladder dome may be related to cystitis. Punctate focus of gas within the urinary bladder either secondary to infection or instrumentation. 6.  Enlarged multinodular thyroid gland.\"    2. Non-muscle invasive bladder cancer:  - 10/3/19: " Cystoscopy showed a small area of erythema on retroflexion, possibly pre-existing or due to retroflexion of the scope. Urine cytology from that time showed cells suspicious for malignancy.   - 1/13/20: Bladder biopsy showed high grade urothelial carcinoma in-situ  - 2/12/20-3/18/20: Intravesical BCG therapy  - 7/24/20 and last cystoscopy was on the same day. It was negative. However, urine cytology was positive for high-grade urothelial carcinoma.   - 11/25/20-12/10/2020: 3 weekly doses of intravesical BCG 50mg.   - 12/10/20: Cytology suspicious and FISH urovysion positive for TCC.    REVIEW OF SYSTEMS: 14 point ROS negative other than the symptoms noted above in the HPI.    PAST MEDICAL AND SURGICAL HISTORY:   Past Medical History:   Diagnosis Date     CRESENCIO (acute kidney injury) (H24) 9/11/2018     Arthritis      Asthma 2/9/2022     Calculus of kidney      Chemotherapy-induced neutropenia (H24) 3/6/2019     Congestive heart failure (H)      Esophageal reflux      GERD (gastroesophageal reflux disease)      Hyperlipidemia LDL goal <130 5/9/2010     Malignant melanoma of skin of trunk, except scrotum (H)      Nonspecific abnormal finding     has living will 2004 -      Nontoxic multinodular goiter     no further eval /tx rec per pt     Osteopenia      Other psoriasis      Personal history of colonic polyps      PMR (polymyalgia rheumatica) (H24)      Restless legs syndrome 10/12/2005     Stress-induced cardiomyopathy      Undiagnosed cardiac murmurs      Unspecified constipation      Unspecified essential hypertension      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     Past Surgical History:   Procedure Laterality Date     ARTHROPLASTY KNEE Right 11/9/2020    Procedure: ARTHROPLASTY, KNEE, TOTAL, RIGHT;  Surgeon: Edward Otero MD;  [facility]     BIOPSY       COLONOSCOPY  2014     COMBINED CYSTOSCOPY, INSERT STENT URETER(S) Bilateral 9/12/2018    Procedure: COMBINED CYSTOSCOPY, INSERT STENT URETER(S);  Cystoscopy  Bilateral ureteral Stent Placement.;  Surgeon: Justin Henry MD;  [facility]     COMBINED CYSTOSCOPY, RETROGRADES, URETEROSCOPY, INSERT STENT Bilateral 4/3/2018    Procedure: COMBINED CYSTOSCOPY, RETROGRADES, URETEROSCOPY, INSERT STENT;;  Surgeon: Stuart King MD;  [facility]     COMBINED CYSTOSCOPY, RETROGRADES, URETEROSCOPY, INSERT STENT Bilateral 9/10/2018    Procedure: COMBINED CYSTOSCOPY, RETROGRADES, URETEROSCOPY, INSERT STENT;  Cystoscopy, Bilateral Ureteroscopy, Bladder Biopsies, Retrogram Pyelograms, Ureteral Washings and brushings, cysview;  Surgeon: Stuart King MD;  [facility]     COMBINED CYSTOSCOPY, RETROGRADES, URETEROSCOPY, INSERT STENT Left 8/26/2020    Procedure: Cystoscopy, Left Ureteral Wash, Left ureteral Retrograde Pyelogram;  Surgeon: Justin Henry MD;  [facility]     CYSTOSCOPY, BIOPSY BLADDER INSTILL OPTICAL AGENT N/A 4/3/2018    Procedure: CYSTOSCOPY, BIOPSY BLADDER INSTILL OPTICAL AGENT;  Cystoscopy, Blue Light Cystoscopy, Bladder Biopsies, Bilateral Selective ureteral washings for Cytology, Bilateral Retrograde Pyelograms, Bilateral Ureteroscopy;  Surgeon: Stuart King MD;  [facility]     CYSTOSCOPY, BIOPSY BLADDER, COMBINED N/A 2/19/2018    Procedure: COMBINED CYSTOSCOPY, BIOPSY BLADDER;  Cystoscopy, Bladder Biopsy;  Surgeon: Kenna La MD;  [facility]     CYSTOSCOPY, BIOPSY BLADDER, COMBINED N/A 8/26/2020    Procedure: Cystoscopy, biopsy bladder, combined;  Surgeon: Justin Henry MD;  [facility]     CYSTOSCOPY, FULGURATE BLADDER TUMOR, COMBINED N/A 1/13/2020    Procedure: CYSTOSCOPY, WITH  bladder biopsies and fulguration;  Surgeon: Stuart King MD;  [facility]     CYSTOSCOPY, REMOVE STENT(S), COMBINED  11/13/2018    Procedure: Flexible Cystoscopy with Stent Removal;  Surgeon: Stuart King MD;  [facility]     DAVINCI LYMPHADENECTOMY N/A  11/13/2018    Procedure: Davinci Lymphadenectomy ;  Surgeon: Stuart King MD;  [facility]     DAVINCI NEPHROURETERECTOMY N/A 11/13/2018    Procedure: Right DaVinci Assisted Nephroureterectomy;  Surgeon: Sutart King MD;  [facility]     ENDOSCOPIC ULTRASOUND LOWER GASTROINTESTIONAL TRACT (GI) N/A 10/30/2015    Procedure: ENDOSCOPIC ULTRASOUND LOWER GASTROINTESTIONAL TRACT (GI);  Surgeon: Daniel Jean-Baptiste MD;  [facility]     ESOPHAGOSCOPY, GASTROSCOPY, DUODENOSCOPY (EGD), COMBINED N/A 11/29/2023    Procedure: Esophagoscopy, gastroscopy, duodenoscopy (EGD), combined;  Surgeon: Justin Peñaloza MD;  [facility]     EYE SURGERY  12/4/17     INSERT PORT VASCULAR ACCESS Right 12/19/2018    Procedure: Chest Port Placement - right;  Surgeon: Stuart Chavez PA-C;  [facility]     IR CHEST PORT PLACEMENT > 5 YRS OF AGE  12/19/2018     IR PORT REMOVAL RIGHT  6/26/2019     LAPAROSCOPIC CHOLECYSTECTOMY WITH CHOLANGIOGRAMS N/A 11/1/2015    Procedure: LAPAROSCOPIC CHOLECYSTECTOMY WITH CHOLANGIOGRAMS;  Surgeon: Tonie Warren MD;  [facility]     REMOVE PORT VASCULAR ACCESS Right 6/26/2019    Procedure: Right Port Removal;  Surgeon: Froilan Irizarry PA-C;  [facility]     SURGICAL HISTORY OF -   1996    malignant melanoma     SURGICAL HISTORY OF -   1968    thyroid nodule     SURGICAL HISTORY OF -       D & C     ZZC NONSPECIFIC PROCEDURE  2005    colonoscopy polyp repeat 2010     SOCIAL HISTORY:   Social History     Tobacco Use     Smoking status: Never     Passive exposure: Never     Smokeless tobacco: Never   Vaping Use     Vaping status: Never Used   Substance Use Topics     Alcohol use: No     Alcohol/week: 0.0 standard drinks of alcohol     Comment: rare     Drug use: No     FAMILY HISTORY:   Family History   Problem Relation Age of Onset     Cancer Father         dec - esophageal and laryngeal     Heart Disease Mother      Respiratory Mother          dec     Breast Cancer Daughter      Other Cancer Daughter      Thyroid Disease Daughter      Asthma Daughter      Hyperlipidemia Son      Diabetes Son      Anesthesia Reaction No family hx of      Deep Vein Thrombosis (DVT) No family hx of      ALLERGIES:   No Known Allergies  CURRENT MEDICATIONS:   Current Outpatient Medications:      alendronate (FOSAMAX) 70 MG tablet, TAKE 1 TABLET EVERY 7 DAYS AT LEAST 60 MINUTES BEFORE BREAKFAST AS DIRECTED., Disp: 12 tablet, Rfl: 3     diltiazem ER (DILT-XR) 180 MG 24 hr capsule, Take 1 capsule (180 mg) by mouth daily, Disp: 90 capsule, Rfl: 3     ferrous sulfate 325 (65 Fe) MG TBEC EC tablet, Take 325 mg by mouth every morning , Disp: , Rfl:      furosemide (LASIX) 20 MG tablet, TAKE 1 TABLET TWICE DAILY IN THE MORNING AND AFTER LUNCH, Disp: 180 tablet, Rfl: 3     irbesartan (AVAPRO) 300 MG tablet, TAKE 1 TABLET EVERY DAY, Disp: 90 tablet, Rfl: 3     lovastatin (MEVACOR) 40 MG tablet, TAKE 1 TABLET AT BEDTIME (SCHEDULE ANNUAL VISIT, NEED FASTING LABS), Disp: 90 tablet, Rfl: 1     methimazole (TAPAZOLE) 5 MG tablet, TAKE 1 TABLET ALTERNATING WITH 1/2 TABLET EVERY OTHER DAY, Disp: 66 tablet, Rfl: 3     Omega-3 Fatty Acids (FISH OIL) 500 MG CAPS, Take 1 capsule by mouth every morning , Disp: , Rfl:      omeprazole (PRILOSEC) 20 MG DR capsule, TAKE 1 CAPSULE EVERY DAY, Disp: 90 capsule, Rfl: 1     propranolol ER (INDERAL LA) 60 MG 24 hr capsule, TAKE 1 CAPSULE EVERY DAY, Disp: 90 capsule, Rfl: 2     RESTASIS 0.05 % ophthalmic emulsion, , Disp: , Rfl:      rivaroxaban ANTICOAGULANT (XARELTO) 15 MG TABS tablet, Take 1 tablet (15 mg) by mouth daily (with dinner), Disp: 90 tablet, Rfl: 3     sertraline (ZOLOFT) 100 MG tablet, TAKE 1 TABLET EVERY MORNING, Disp: 90 tablet, Rfl: 1     traZODone (DESYREL) 50 MG tablet, TAKE 1/2 TABLET AT BEDTIME, Disp: 45 tablet, Rfl: 3    Current Facility-Administered Medications:      lidocaine (XYLOCAINE) 2 % external gel, , Urethral, Once,  Justin Henry MD    PHYSICAL EXAMINATION:  General: No acute distress  HEENT: Sclera anicteric. Oral mucosa pink and moist.  No mucositis or thrush  Lymph: No lymphadenopathy in neck  Heart: Regular, rate, and rhythm  Lungs: Clear to ascultation bilaterally  Abdomen: Positive bowel sounds. Soft, non-distended, non-tender. No organomegaly or mass.   Extremities: no lower extremity edema  Neuro: Cranial nerves grossly intact  Rash: none  Vascular access: port    LABORATORY DATA:       Recent Labs   Lab Test 12/18/23  1548 12/07/23  1613 11/14/23  0022 11/13/23 1952 11/08/23  1431    138 142 146* 138   POTASSIUM 4.6 4.7 4.1 5.3 4.5   CHLORIDE 99 100 103 95* 100   CO2 29 29 26 22 27   ANIONGAP 11 9 13 29* 11   BUN 19.7 16.8 13.9 15.0 20.2   CR 1.04* 0.93 0.87 0.84 1.01*   * 103* 104* 93 94   SHREYA 9.4 9.4 9.1 9.1 9.6     Recent Labs   Lab Test 09/02/21  0644 09/01/21  1505 02/03/19  2102 12/12/18  1050 01/16/18  1247 01/16/18  0646 12/13/17  2236   MAG 2.4* 2.3 1.8 2.1 2.1   < > 2.0   PHOS  --   --   --   --   --   --  2.4*    < > = values in this interval not displayed.     Recent Labs   Lab Test 03/15/24  1232 12/18/23  1548 12/07/23  1613 11/14/23  0022 11/13/23 1952 11/08/23  1431 07/18/23  1158 02/27/23  1306 12/09/22  1000   WBC 10.0 7.6 8.4 8.0 7.5   < > 6.4 8.4 7.7   HGB 12.9 12.7 12.8 12.3 13.0   < > 12.9 12.8 13.3    214 220 191 221   < > 209 192 240   MCV 93 98 96 98 98   < > 97 97 97   NEUTROPHIL  --   --   --  68 70  --  71 73 78    < > = values in this interval not displayed.     Recent Labs   Lab Test 11/14/23  0022 11/13/23  1952 11/08/23  1431 07/18/23  1158 02/27/23  1306 12/09/22  1000 12/09/21  2356 11/19/21  0911 08/11/21  1031 09/11/18  0612 07/17/18  1346   BILITOTAL 0.5 0.5 0.5 0.5   < > 0.5   < > 0.5 0.4   < >  --    ALKPHOS 86  --  89 82   < > 91   < > 98 88   < >  --    ALT 21  --  17 11   < > 19   < > 23 17   < >  --    AST  --   --  22 22  --  27   < > 15 14   < >  " --    ALBUMIN 4.1 4.0 4.2 4.2   < > 4.2   < > 3.4 3.5   < >  --    LDH  --   --   --   --   --   --   --  180 176  --  204    < > = values in this interval not displayed.     TSH   Date Value Ref Range Status   05/28/2024 3.17 0.30 - 4.20 uIU/mL Final   06/22/2023 2.39 0.30 - 4.20 uIU/mL Final   12/09/2022 2.62 0.30 - 4.20 uIU/mL Final   05/20/2022 2.90 0.40 - 4.00 mU/L Final   09/02/2021 1.24 0.40 - 4.00 mU/L Final   07/05/2021 1.68 0.40 - 4.00 mU/L Final   05/27/2021 1.93 0.40 - 4.00 mU/L Final   01/27/2021 2.42 0.40 - 4.00 mU/L Final     No results for input(s): \"CEA\" in the last 92851 hours.  Results for orders placed or performed in visit on 09/13/24   CT Chest/Abdomen/Pelvis w Contrast    Narrative    EXAMINATION: CT CHEST/ABDOMEN/PELVIS W CONTRAST, 9/13/2024 11:45 AM    TECHNIQUE: Helical CT images from the thoracic inlet through the  symphysis pubis were obtained with intravenous contrast. Contrast  dose: Isovue 370 89cc    COMPARISON: CT 3/15/2024    HISTORY: Urothelial carcinoma (H); Urothelial carcinoma of right  distal ureter (H)    FINDINGS:    Chest: Stable enlarged multinodular thyroid gland.    Heart/ Mediastinum: Heart is within normal limits. No evidence of  central pulmonary embolism. No bulky lymphadenopathy.  Esophagus  appears normal. Small hiatal hernia.    Lungs/pleura: The central tracheobronchial tree is patent.  No focal  mass or consolidation.  Stable 4 mm nodule in left upper lobe on  series 4 image 82. Stable calcified nodule in the right lower lobe.  Decrease focus of subpleural thickening in the posterior left lower  lobe measuring 3 mm on series 3 image 144, previously 6 mm, and a 2 mm  focus on image 152 is unchanged. No new suspicious nodules. No  pneumothorax or pleural effusion.     Chest wall/axilla: No bulky lymphadenopathy..    Abdomen and Pelvis:    Liver: Unremarkable. No suspicious masses. No hepatic steatosis.     Gallbladder/biliary tree: Gallbladder is surgically " absent. Stable  common bile duct dilatation.    Spleen: Unremarkable.    Pancreas: Unremarkable. No mass or ductal dilatation.    Adrenal glands: Unremarkable.    Kidneys: Right nephrectomy. No suspicious lesion in the surgical bed.  Unremarkable left kidney. No hydronephrosis. 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    Bowel: Small hiatal hernia. Normal appendix. No obstruction.    Retroperitoneum: Atherosclerotic calcifications of the aorta and its  major branches..  No bulky lymphadenopathy.    Pelvis: Urinary bladder is unremarkable.  Uterus and adnexa are within  normal limits.    Bones: Moderate multilevel degenerative changes of the spine. No  suspicious lesions. Stable exostosis at the left proximal femur.    Soft Tissues: Unremarkable.      Impression    IMPRESSION:    1.  Stable right nephrectomy. No convincing evidence of recurrent or new metastatic disease.  2.  Decreased size of pleural-based lesion in the posterior left hemithorax.  3.  Stable multinodular thyroid.    STEFANI RAO DO         SYSTEM ID:  [id number]     *Note: Due to a large number of results and/or encounters for the requested time period, some results have not been displayed. A complete set of results can be found in Results Review.     Cytology non gyn: [id number]  Order: [id number]  Collected 9/13/2024 11:45 AM       Status: Final result       Visible to patient: Yes (seen)       Dx: Urothelial carcinoma (H)    0 Result Notes      Component    Final Diagnosis   Specimen A                 Interpretation:                  Negative for High Grade Urothelial Carcinoma                 Other Findings:                  Crystals present.                 Adequacy:                 Satisfactory for evaluation         Electronically signed by Alejandro Morton III, MD on 9/16/2024 at  1:54 PM   Clinical Information    89          I reviewed the above labs and imaging today.      ASSESSMENT AND PLAN: Ms. 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      Dimple continues to remain in good health. She has no new reported symptoms in history. We spent time reviewing her CT CAP from 3/15/24. She has a stable pleural based lung change and a stable thyroid nodule when compared to 09/13/23 scan. There previously was a small FDG uptake in the hepatic flexure of the colon. There are no bowel lesions depicted in her scan from 3/15/24.   - Please see my note from 09/2023 regarding biopsying the lesions. Overall, decision was to continue with surveillance with CT CAP every 6 months. If she has any symptoms from her lesion we could consider radiation even without a biopsy.    1. Upper tract urothelial cancer:   - She completed 4 cycles of adjuvant carboplatin plus gemcitabine chemotherapy per POUT trial.    She completed adjuvant chemotherapy in Feb 2019.  - No residual side effects or evidence of recurrence.  - Reviewed restaging CT scan9/13/24 as above; there is no clear evidence of disease recurrence in chest/abd/pelvis.   She continues to have pulmonary nodules which remain stable and a stable pleural nodule in left lower lobe.   Urine for cytology is again negative for recurrent disease. FISH by U[facility] remains pending at this time  Surveillance as above at this time.         2. High grade urothelial carcinoma in situ:  - Bx proven carcinoma in situ in 1/2020, completed the first round of intravesical BCG treatment 2/2020-3/2020 and second in 11/2020-12/2020.  - She was initially followed by Dr. King and now is under care of Dr. Henry.   - She did have urine cytology and is negative; FISH by [facility] is pending    4. HERMAN:  - She follows Dr. Griffith in Cardiology for her h/o moderate AORTIC STENOSIS, CHF, and Afib with  SOA and lymphedema with no cancer-related etiology evident. Appears stable today.     Return to clinic in 6 months with restaging scans with me .     The longitudinal plan of care for the diagnosis(es)/condition(s) as documented were addressed during this visit. Due to the added complexity in care, I will continue to support Dimple in the subsequent management and with ongoing continuity of care.     30 minutes spent on the date of the encounter doing chart review, history and exam, documentation and further activities as noted above       Again, thank you for allowing me to participate in the care of your patient.        Sincerely,        Sd Fine MD

## 2024-09-17 NOTE — PROGRESS NOTES
ELECTROPHYSIOLOGY CLINIC VISIT    Assessment/Recommendations   Assessment/Plan:    Dimple Oviedo is a 89 year old female with past medical history significant for urothelial carcinoma, hyperlipidemia, GERD, melanoma, HTN, CHF, lymphedema, hyperthyroidism and atrial fibrillation.     Permanent Atrial Fibrillation   We discussed in detail with the patient management/treatment options for A.fib includin. Stroke Prophylaxis: CHADSVASC= 4 ++age, +female, +htn 4, corresponding to a 4.0% annual stroke / systemic emolism event rate. indicating need for long term oral anticoagulation. Continue Xarelto 15 mg daily (CrCl 46 mL/min).   2. Rate Control: Rates mid 70s during visit. Continue Propanolol and Diltiazem.   3. Risk Factor Management: Statin, BP control, and JESS evaluation.  Continue lovastatin 40 mg daily. BP today 134/81. Echo done 23 with normal LV function, LVEF 55-60%. Normal RV function. PASP 48, severe left atrial enlargement and dilation of IVC. Weight appears stable. Lasix temporarily increase following echo, now taking 20 mg in am, and afternoon.     Establish with general cardiology.   Follow up with EP as needed.      History of Present Illness/Subjective    Ms. Dimple Oviedo is a 89 year old female who comes in today for EP follow-up of AF.    Patient is a 89 yo female with past medical history significant for urothelial carcinoma, hyperlipidemia, GERD, melanoma, HTN, CHF, lymphedema, hyperthyroidism and atrial fibrillation. She presented to the ER in 2017 with chest pain and SOB, found to have elevated troponin. Echocardiogram reveals reduced EF, 30%, no ischemic changes on ECG, thought to likely be d/t stress cardiomyopathy. Repeat echo done 18 with improvement in LVEF to > 65%. She has continued to follow with Dr Griffith annually. She has remained in A fin since 2021. At visit with Dr Griffith in  diltiazem increased due to A fib rates ~100 bpm. At follow up  last year, 2022, ventricular rates improved to ~85bpm.      EP Visit 12/7/23: She reports feeling well. Her main concern today is dark emesis that has been occurring about once a month for the past 3 months. She was evaluated in the ER for this on 11/13/23, hemoglobin 13, 4 hour recheck stable. Workup otherwise unremarkable. Endoscopy done 11/29, she had been given the results yet. Echo done today with normal LV function, LVEF 55-60%. Normal RV function. PASP 48, severe left atrial enlargement and dilation of IVC. Last episode of emesis yesterday morning. She denies chest discomfort, worsening shortness of breath. She feels peripheral edema is at her baseline with her lymphedema. Her daughter voices concern about in home services to assist patient with thing such as putting on compression socks. Presenting 12 lead ECG shows A fib Vent Rate 86 bpm, QRS 96 ms, QTc 437 ms.     She presents today for follow up. She has been feeling well. She was seen by GI after out last visit, EGD results with hiatal hernia but otherwise normal. Recommended continued PPI, can stop iron supplements with recheck hgb in 3 months. Shortness of breath has been stable, she is able to do about seven labs around her assisted living. She has been trying to eat less sodium, but reports its hard since she does eat some pre made meals through her facility. She denies chest discomfort, worsening peripheral edema or shortness of breath, lightheadedness, dizziness, pre-syncope, or syncope. Presenting 12 lead ECG shows AF Vent Rate 74 bpm,  ms, QTc 430 ms.     I have reviewed and updated the patient's Past Medical History, Social History, Family History and Medication List.     Cardiographics (Personally Reviewed) :   Echo: 12/07/2023   Interpretation Summary  Global and regional left ventricular function is normal with an EF of 55-60%.  Global right ventricular function is normal.  Mild aortic insufficiency.  Moderate tricuspid  insufficiency.  Elevated pulmonary artery systolic pressure, 48 mmHg.  Dilation of the inferior vena cava is present with normal respiratory  variation in diameter.  This study was compared with the study from 9/2/21. No change in ventricular  function; AI and TR are worse and estimated PA pressure is higher.     Echo: 9/02/2021   Interpretation Summary  The patient's rhythm is atrial fibrillation.  Global and regional left ventricular function is normal with an EF of 55-60%.  RV is not well seen, fxn is probably normal to mildly reduced.  No significant valvular abnormalities present.  IVC diameter and respiratory changes fall into an intermediate range  suggesting an RA pressure of 8 mmHg.  No pericardial effusion is present.  There has been no change.     Coronary Angiogram/[facility]: 12/13/17          Physical Examination   /81 (BP Location: Right arm, Patient Position: Chair, Cuff Size: Adult Large)   Pulse 73   Wt 81.1 kg (178 lb 14.4 oz)   SpO2 94%   BMI 37.39 kg/m    Wt Readings from Last 3 Encounters:   09/17/24 80.7 kg (178 lb)   09/17/24 81.1 kg (178 lb 14.4 oz)   07/02/24 80.6 kg (177 lb 12.8 oz)     General Appearance:   Alert, well-appearing and in no acute distress.   HEENT: Atraumatic, normocephalic. MMM.   Chest/Lungs:   Respirations unlabored.  Lungs are clear to auscultation.   Cardiovascular:   Regular rate and irregular rhythm.  S1/S2. No murmur.    Abdomen:  Soft, nontender, nondistended.   Extremities: No cyanosis or clubbing. Bilateral non pitting edema.    Musculoskeletal: Moves all extremities.     Skin: Warm, dry, intact.    Neurologic: Mood and affect are appropriate.  Alert and oriented to person, place, time, and situation.          Medications  Allergies   Current Outpatient Medications   Medication Sig Dispense Refill    alendronate (FOSAMAX) 70 MG tablet TAKE 1 TABLET EVERY 7 DAYS AT LEAST 60 MINUTES BEFORE BREAKFAST AS DIRECTED. 12 tablet 3    diltiazem ER (DILT-XR) 180 MG 24 hr  capsule Take 1 capsule (180 mg) by mouth daily 90 capsule 3    ferrous sulfate 325 (65 Fe) MG TBEC EC tablet Take 325 mg by mouth every morning       furosemide (LASIX) 20 MG tablet TAKE 1 TABLET TWICE DAILY IN THE MORNING AND AFTER LUNCH 180 tablet 3    irbesartan (AVAPRO) 300 MG tablet TAKE 1 TABLET EVERY DAY 90 tablet 3    lovastatin (MEVACOR) 40 MG tablet TAKE 1 TABLET AT BEDTIME (SCHEDULE ANNUAL VISIT, NEED FASTING LABS) 90 tablet 1    methimazole (TAPAZOLE) 5 MG tablet TAKE 1 TABLET ALTERNATING WITH 1/2 TABLET EVERY OTHER DAY 66 tablet 3    Omega-3 Fatty Acids (FISH OIL) 500 MG CAPS Take 1 capsule by mouth every morning       omeprazole (PRILOSEC) 20 MG DR capsule TAKE 1 CAPSULE EVERY DAY 90 capsule 1    propranolol ER (INDERAL LA) 60 MG 24 hr capsule TAKE 1 CAPSULE EVERY DAY 90 capsule 2    RESTASIS 0.05 % ophthalmic emulsion       rivaroxaban ANTICOAGULANT (XARELTO) 15 MG TABS tablet Take 1 tablet (15 mg) by mouth daily (with dinner) 90 tablet 3    sertraline (ZOLOFT) 100 MG tablet TAKE 1 TABLET EVERY MORNING 90 tablet 1    traZODone (DESYREL) 50 MG tablet TAKE 1/2 TABLET AT BEDTIME 45 tablet 3     Current Facility-Administered Medications   Medication Dose Route Frequency Provider Last Rate Last Admin    lidocaine (XYLOCAINE) 2 % external gel   Urethral Once Justin Henry MD          No Known Allergies      Lab Results (Personally Reviewed)    Chemistry/lipid CBC Cardiac Enzymes/BNP/TSH/INR   Lab Results   Component Value Date    BUN 19.7 12/18/2023     12/18/2023    CO2 29 12/18/2023     Creatinine   Date Value Ref Range Status   09/13/2024 1.01 (H) 0.51 - 0.95 mg/dL Final   05/11/2021 1.04 0.52 - 1.04 mg/dL Final     Creatinine POCT   Date Value Ref Range Status   09/13/2024 1.1 (H) 0.5 - 1.0 mg/dL Final       Lab Results   Component Value Date    CHOL 150 11/08/2023    HDL 46 (L) 11/08/2023    LDL 76 11/08/2023      Lab Results   Component Value Date    WBC 10.0 03/15/2024    HGB 12.9  03/15/2024    HCT 40.6 03/15/2024    MCV 93 03/15/2024     03/15/2024    Lab Results   Component Value Date    TSH 3.17 05/28/2024    INR 2.57 (H) 11/13/2023        The patient states understanding and is agreeable with the plan.     Chandrika Camp PA-C  [facility]  Electrophysiology Consult Service  Pager: [phone]    I spent a total of 20 minutes face to face with Dimple Oviedo during today's office visit. I have spent an additional 15 minutes today on chart review and documentation.

## 2024-09-17 NOTE — PROGRESS NOTES
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    HISTORY OF PRESENT ILLNESS:  Ms. 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 Her oncologic history is as under.    Ms. Oviedo is doing well. She denies any complains suggestive of recurrent disease. She had covid at the beginning of the month with a cough. She is feeling better now. She has no new pains in her body. No hematuria. No fevers or chills. No chest pain or shortness of breath.     She is accompanied by her son Issa.      ONCOLOGIC HISTORY:  1. 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   - Jan 2018 to September 2018- Persistent gross hematuria and underwent multiple procedures.    - 2/19/18 and 4/3/18- cystoscopies with bladder biopsies  which were negative for bladder tumor  - 1/17/18, 3/8/18, 7/26/18, 9/10/18- Multiple urine cytologies have come back positive by FISH for high-grade transitional cell carcinoma  - 9/10/18- Underwent a bilateral cystoureteroscopy with biopsy and brushing that showed  right upper tract cytology suspicious for high-grade urothelial ca. Right ureter brushing negative from that day. Left upper tract negative.  - 9/10/18- CT A/P without contrast showed new small bilateral pleural effusions and interstitial pulmonary edema but no evidence of locoregional or metastatic disease.  - 9/12/18- Presented to ED with oliguria, CRESENCIO, and mild hydronephrosis bilaterally on CT scan and underwent bilateral ureteral stent placment  - 11/13/2018- Right robotic nephroureterectomy, excision of sandip-caval mass and LN excision by Stuart King.  "Pathology showed \"Right nephroureterectomy: Invasive high grade urothelial carcinoma measuring 3.5 cm, located in renal pelvis and invading through renal parenchyma into perinephric fat. Urothelial carcinoma in-situ present. Margins free of tumor. Ana-caval mass: Metastatic urothelial carcinoma involving one lymph node with at least 1 cm tumor deposit. Pericaval Extranodal Extension present. Five additional benign pericaval lymph nodes. Pathologic stage: pT4N1 (1 of 6 lymph nodes).\"  - 12/11/18- PET/CT whole body demonstrated significant residual FDG avid disease. For example, \"there is an FDG avid ill-defined pericaval mass immediately medial to the surgical clips measuring approximately 2.0 x 1.4 cm, with max SUV measuring up to12.2, see series 4 image 21. Additional FDG avid retrocaval and interaortocaval lymphadenopathy are noted.There is a 1.2 cm nodule in the right hemipelvis posterior lateral tothe bladder with max SUV measuring 8.3, see series 4 image 77. The bladder is incompletely distended.\" No suspicious thoracic or bone uptake.  - 12/12/18- Started adjuvant chemotherapy (carbo+gem) per POUT trial. Cycle 2 - 1/2/19. Cycle 3 - 1/23/19. Cycle 4 - 02/13/19.  - 1/22/19 - CT C/A/P with contrast compared with prior PET/CT: \"1. New well-circumscribed soft tissue pulmonary nodules of the right lower lobe and left upper lobe. Findings could be infectious, inflammatory, or represent metastatic disease. Continued attention on follow-up recommended. Postoperative changes of right nephrectomy. No evidence for local recurrence in the present study.\"  - 3/1/2019- CT C/A/P with contrast - \"1. Bilateral pulmonary nodules measuring up to 4 mm in the right lower lobe, previously measuring 8 mm. No new or enlarging pulmonary nodules. 2. No convincing evidence for metastatic disease in the abdomen, pelvis and bones.\"  - 6/3/2019- CT C/A/P with contrast - \"1. Surgical changes of right nephrectomy without evidence of residual " "or recurrent disease on this noncontrast exam.  Consider contrast enhanced examination on follow-up. 2. No evidence of metastatic disease in the abdomen, or pelvis on noncontrast CT.  Scattered tiny pulmonary nodules in the chest are not significantly changed.  Previously described prominent right lower lobe pulmonary nodule (previously measuring up to 8 mm) is no longer visualized. 3. Stable bilateral pulmonary nodules measuring maximally 4 mm.\"  - 09/11/19: CT C/A/P without contrast - \"1. Stable exam without evidence convincing for new disease. 2. Stable pulmonary nodules. 3. 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 4. Stable heterogeneous enlargement of the thyroid with underlying nodules suggested.\"  - 12/4/19: CT C/A/P without contrast - \"No acute change since prior exam. No evidence of recurrent or metastatic disease. Tiny lung nodules are stable. Prior right nephrectomy. Left renal artery aneurysm is stable.\"  - 04/08/20, 7/27/20: CT C/A/P with contrast - GABRIELLE.  - 01/12/21: CT C/A/P with contrast - \"1.  Slight increased size of a 6 mm left upper lobe nodule and new 4 mm linear opacity along the right major fissure. These are indeterminate but could represent progression of metastatic disease. 2.  Slight increased size of mildly enlarged mediastinal and hilar lymph nodes. 3.  Mild pulmonary edema. 4.  No recurrent or metastatic disease in the abdomen and pelvis. 5.  Mild stranding around the urinary bladder dome may be related to cystitis. Punctate focus of gas within the urinary bladder either secondary to infection or instrumentation. 6.  Enlarged multinodular thyroid gland.\"    2. Non-muscle invasive bladder cancer:  - 10/3/19: Cystoscopy showed a small area of erythema on retroflexion, possibly pre-existing or due to retroflexion of the scope. Urine cytology from that time showed cells suspicious for malignancy.   - 1/13/20: Bladder biopsy showed high grade urothelial carcinoma in-situ  - " 2/12/20-3/18/20: Intravesical BCG therapy  - 7/24/20 and last cystoscopy was on the same day. It was negative. However, urine cytology was positive for high-grade urothelial carcinoma.   - 11/25/20-12/10/2020: 3 weekly doses of intravesical BCG 50mg.   - 12/10/20: Cytology suspicious and FISH urovysion positive for TCC.    REVIEW OF SYSTEMS: 14 point ROS negative other than the symptoms noted above in the HPI.    PAST MEDICAL AND SURGICAL HISTORY:   Past Medical History:   Diagnosis Date    CRESENCIO (acute kidney injury) (H24) 9/11/2018    Arthritis     Asthma 2/9/2022    Calculus of kidney     Chemotherapy-induced neutropenia (H24) 3/6/2019    Congestive heart failure (H)     Esophageal reflux     GERD (gastroesophageal reflux disease)     Hyperlipidemia LDL goal <130 5/9/2010    Malignant melanoma of skin of trunk, except scrotum (H)     Nonspecific abnormal finding     has living will 2004 -     Nontoxic multinodular goiter     no further eval /tx rec per pt    Osteopenia     Other psoriasis     Personal history of colonic polyps     PMR (polymyalgia rheumatica) (H24)     Restless legs syndrome 10/12/2005    Stress-induced cardiomyopathy     Undiagnosed cardiac murmurs     Unspecified constipation     Unspecified essential hypertension     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     Past Surgical History:   Procedure Laterality Date    ARTHROPLASTY KNEE Right 11/9/2020    Procedure: ARTHROPLASTY, KNEE, TOTAL, RIGHT;  Surgeon: Edward Otero MD;  [facility]    BIOPSY      COLONOSCOPY  2014    COMBINED CYSTOSCOPY, INSERT STENT URETER(S) Bilateral 9/12/2018    Procedure: COMBINED CYSTOSCOPY, INSERT STENT URETER(S);  Cystoscopy Bilateral ureteral Stent Placement.;  Surgeon: Justin Henry MD;  [facility]    COMBINED CYSTOSCOPY, RETROGRADES, URETEROSCOPY, INSERT STENT Bilateral 4/3/2018    Procedure: COMBINED CYSTOSCOPY, RETROGRADES, URETEROSCOPY, INSERT STENT;;  Surgeon: Stuart King MD;   [facility]    COMBINED CYSTOSCOPY, RETROGRADES, URETEROSCOPY, INSERT STENT Bilateral 9/10/2018    Procedure: COMBINED CYSTOSCOPY, RETROGRADES, URETEROSCOPY, INSERT STENT;  Cystoscopy, Bilateral Ureteroscopy, Bladder Biopsies, Retrogram Pyelograms, Ureteral Washings and brushings, cysview;  Surgeon: Stuart King MD;  [facility]    COMBINED CYSTOSCOPY, RETROGRADES, URETEROSCOPY, INSERT STENT Left 8/26/2020    Procedure: Cystoscopy, Left Ureteral Wash, Left ureteral Retrograde Pyelogram;  Surgeon: Justin Henry MD;  [facility]    CYSTOSCOPY, BIOPSY BLADDER INSTILL OPTICAL AGENT N/A 4/3/2018    Procedure: CYSTOSCOPY, BIOPSY BLADDER INSTILL OPTICAL AGENT;  Cystoscopy, Blue Light Cystoscopy, Bladder Biopsies, Bilateral Selective ureteral washings for Cytology, Bilateral Retrograde Pyelograms, Bilateral Ureteroscopy;  Surgeon: Stuart King MD;  [facility]    CYSTOSCOPY, BIOPSY BLADDER, COMBINED N/A 2/19/2018    Procedure: COMBINED CYSTOSCOPY, BIOPSY BLADDER;  Cystoscopy, Bladder Biopsy;  Surgeon: Kenna La MD;  [facility]    CYSTOSCOPY, BIOPSY BLADDER, COMBINED N/A 8/26/2020    Procedure: Cystoscopy, biopsy bladder, combined;  Surgeon: Justin Henry MD;  [facility]    CYSTOSCOPY, FULGURATE BLADDER TUMOR, COMBINED N/A 1/13/2020    Procedure: CYSTOSCOPY, WITH  bladder biopsies and fulguration;  Surgeon: Stuart King MD;  [facility]    CYSTOSCOPY, REMOVE STENT(S), COMBINED  11/13/2018    Procedure: Flexible Cystoscopy with Stent Removal;  Surgeon: Stuart King MD;  [facility]    DAVINCI LYMPHADENECTOMY N/A 11/13/2018    Procedure: Davinci Lymphadenectomy ;  Surgeon: Stuart King MD;  [facility]    DAVINCI NEPHROURETERECTOMY N/A 11/13/2018    Procedure: Right DaVinci Assisted Nephroureterectomy;  Surgeon: Stuart King MD;  [facility]    ENDOSCOPIC ULTRASOUND LOWER  GASTROINTESTIONAL TRACT (GI) N/A 10/30/2015    Procedure: ENDOSCOPIC ULTRASOUND LOWER GASTROINTESTIONAL TRACT (GI);  Surgeon: Daniel Jean-Baptiste MD;  [facility]    ESOPHAGOSCOPY, GASTROSCOPY, DUODENOSCOPY (EGD), COMBINED N/A 11/29/2023    Procedure: Esophagoscopy, gastroscopy, duodenoscopy (EGD), combined;  Surgeon: Justin Peñaloza MD;  [facility]    EYE SURGERY  12/4/17    INSERT PORT VASCULAR ACCESS Right 12/19/2018    Procedure: Chest Port Placement - right;  Surgeon: Stuart Chavez PA-C;  [facility]    IR CHEST PORT PLACEMENT > 5 YRS OF AGE  12/19/2018    IR PORT REMOVAL RIGHT  6/26/2019    LAPAROSCOPIC CHOLECYSTECTOMY WITH CHOLANGIOGRAMS N/A 11/1/2015    Procedure: LAPAROSCOPIC CHOLECYSTECTOMY WITH CHOLANGIOGRAMS;  Surgeon: Tonie Warren MD;  [facility]    REMOVE PORT VASCULAR ACCESS Right 6/26/2019    Procedure: Right Port Removal;  Surgeon: Froilan Irizarry PA-C;  [facility]    SURGICAL HISTORY OF -   1996    malignant melanoma    SURGICAL HISTORY OF -   1968    thyroid nodule    SURGICAL HISTORY OF -       D & C    ZZC NONSPECIFIC PROCEDURE  2005    colonoscopy polyp repeat 2010     SOCIAL HISTORY:   Social History     Tobacco Use    Smoking status: Never     Passive exposure: Never    Smokeless tobacco: Never   Vaping Use    Vaping status: Never Used   Substance Use Topics    Alcohol use: No     Alcohol/week: 0.0 standard drinks of alcohol     Comment: rare    Drug use: No     FAMILY HISTORY:   Family History   Problem Relation Age of Onset    Cancer Father         dec - esophageal and laryngeal    Heart Disease Mother     Respiratory Mother         dec    Breast Cancer Daughter     Other Cancer Daughter     Thyroid Disease Daughter     Asthma Daughter     Hyperlipidemia Son     Diabetes Son     Anesthesia Reaction No family hx of     Deep Vein Thrombosis (DVT) No family hx of      ALLERGIES:   No Known Allergies  CURRENT MEDICATIONS:   Current Outpatient  Medications:     alendronate (FOSAMAX) 70 MG tablet, TAKE 1 TABLET EVERY 7 DAYS AT LEAST 60 MINUTES BEFORE BREAKFAST AS DIRECTED., Disp: 12 tablet, Rfl: 3    diltiazem ER (DILT-XR) 180 MG 24 hr capsule, Take 1 capsule (180 mg) by mouth daily, Disp: 90 capsule, Rfl: 3    ferrous sulfate 325 (65 Fe) MG TBEC EC tablet, Take 325 mg by mouth every morning , Disp: , Rfl:     furosemide (LASIX) 20 MG tablet, TAKE 1 TABLET TWICE DAILY IN THE MORNING AND AFTER LUNCH, Disp: 180 tablet, Rfl: 3    irbesartan (AVAPRO) 300 MG tablet, TAKE 1 TABLET EVERY DAY, Disp: 90 tablet, Rfl: 3    lovastatin (MEVACOR) 40 MG tablet, TAKE 1 TABLET AT BEDTIME (SCHEDULE ANNUAL VISIT, NEED FASTING LABS), Disp: 90 tablet, Rfl: 1    methimazole (TAPAZOLE) 5 MG tablet, TAKE 1 TABLET ALTERNATING WITH 1/2 TABLET EVERY OTHER DAY, Disp: 66 tablet, Rfl: 3    Omega-3 Fatty Acids (FISH OIL) 500 MG CAPS, Take 1 capsule by mouth every morning , Disp: , Rfl:     omeprazole (PRILOSEC) 20 MG DR capsule, TAKE 1 CAPSULE EVERY DAY, Disp: 90 capsule, Rfl: 1    propranolol ER (INDERAL LA) 60 MG 24 hr capsule, TAKE 1 CAPSULE EVERY DAY, Disp: 90 capsule, Rfl: 2    RESTASIS 0.05 % ophthalmic emulsion, , Disp: , Rfl:     rivaroxaban ANTICOAGULANT (XARELTO) 15 MG TABS tablet, Take 1 tablet (15 mg) by mouth daily (with dinner), Disp: 90 tablet, Rfl: 3    sertraline (ZOLOFT) 100 MG tablet, TAKE 1 TABLET EVERY MORNING, Disp: 90 tablet, Rfl: 1    traZODone (DESYREL) 50 MG tablet, TAKE 1/2 TABLET AT BEDTIME, Disp: 45 tablet, Rfl: 3    Current Facility-Administered Medications:     lidocaine (XYLOCAINE) 2 % external gel, , Urethral, Once, Justin Henry MD    PHYSICAL EXAMINATION:  General: No acute distress  HEENT: Sclera anicteric. Oral mucosa pink and moist.  No mucositis or thrush  Lymph: No lymphadenopathy in neck  Heart: Regular, rate, and rhythm  Lungs: Clear to ascultation bilaterally  Abdomen: Positive bowel sounds. Soft, non-distended, non-tender. No  organomegaly or mass.   Extremities: no lower extremity edema  Neuro: Cranial nerves grossly intact  Rash: none  Vascular access: port    LABORATORY DATA:       Recent Labs   Lab Test 12/18/23  1548 12/07/23  1613 11/14/23  0022 11/13/23 1952 11/08/23  1431    138 142 146* 138   POTASSIUM 4.6 4.7 4.1 5.3 4.5   CHLORIDE 99 100 103 95* 100   CO2 29 29 26 22 27   ANIONGAP 11 9 13 29* 11   BUN 19.7 16.8 13.9 15.0 20.2   CR 1.04* 0.93 0.87 0.84 1.01*   * 103* 104* 93 94   SHREYA 9.4 9.4 9.1 9.1 9.6     Recent Labs   Lab Test 09/02/21  0644 09/01/21  1505 02/03/19  2102 12/12/18  1050 01/16/18  1247 01/16/18  0646 12/13/17  2236   MAG 2.4* 2.3 1.8 2.1 2.1   < > 2.0   PHOS  --   --   --   --   --   --  2.4*    < > = values in this interval not displayed.     Recent Labs   Lab Test 03/15/24  1232 12/18/23  1548 12/07/23  1613 11/14/23  0022 11/13/23 1952 11/08/23  1431 07/18/23  1158 02/27/23  1306 12/09/22  1000   WBC 10.0 7.6 8.4 8.0 7.5   < > 6.4 8.4 7.7   HGB 12.9 12.7 12.8 12.3 13.0   < > 12.9 12.8 13.3    214 220 191 221   < > 209 192 240   MCV 93 98 96 98 98   < > 97 97 97   NEUTROPHIL  --   --   --  68 70  --  71 73 78    < > = values in this interval not displayed.     Recent Labs   Lab Test 11/14/23  0022 11/13/23 1952 11/08/23  1431 07/18/23  1158 02/27/23  1306 12/09/22  1000 12/09/21  2356 11/19/21  0911 08/11/21  1031 09/11/18  0612 07/17/18  1346   BILITOTAL 0.5 0.5 0.5 0.5   < > 0.5   < > 0.5 0.4   < >  --    ALKPHOS 86  --  89 82   < > 91   < > 98 88   < >  --    ALT 21  --  17 11   < > 19   < > 23 17   < >  --    AST  --   --  22 22  --  27   < > 15 14   < >  --    ALBUMIN 4.1 4.0 4.2 4.2   < > 4.2   < > 3.4 3.5   < >  --    LDH  --   --   --   --   --   --   --  180 176  --  204    < > = values in this interval not displayed.     TSH   Date Value Ref Range Status   05/28/2024 3.17 0.30 - 4.20 uIU/mL Final   06/22/2023 2.39 0.30 - 4.20 uIU/mL Final   12/09/2022 2.62 0.30 - 4.20 uIU/mL  "Final   05/20/2022 2.90 0.40 - 4.00 mU/L Final   09/02/2021 1.24 0.40 - 4.00 mU/L Final   07/05/2021 1.68 0.40 - 4.00 mU/L Final   05/27/2021 1.93 0.40 - 4.00 mU/L Final   01/27/2021 2.42 0.40 - 4.00 mU/L Final     No results for input(s): \"CEA\" in the last [id number] hours.  Results for orders placed or performed in visit on 09/13/24   CT Chest/Abdomen/Pelvis w Contrast    Narrative    EXAMINATION: CT CHEST/ABDOMEN/PELVIS W CONTRAST, 9/13/2024 11:45 AM    TECHNIQUE: Helical CT images from the thoracic inlet through the  symphysis pubis were obtained with intravenous contrast. Contrast  dose: Isovue 370 89cc    COMPARISON: CT 3/15/2024    HISTORY: Urothelial carcinoma (H); Urothelial carcinoma of right  distal ureter (H)    FINDINGS:    Chest: Stable enlarged multinodular thyroid gland.    Heart/ Mediastinum: Heart is within normal limits. No evidence of  central pulmonary embolism. No bulky lymphadenopathy.  Esophagus  appears normal. Small hiatal hernia.    Lungs/pleura: The central tracheobronchial tree is patent.  No focal  mass or consolidation.  Stable 4 mm nodule in left upper lobe on  series 4 image 82. Stable calcified nodule in the right lower lobe.  Decrease focus of subpleural thickening in the posterior left lower  lobe measuring 3 mm on series 3 image 144, previously 6 mm, and a 2 mm  focus on image 152 is unchanged. No new suspicious nodules. No  pneumothorax or pleural effusion.     Chest wall/axilla: No bulky lymphadenopathy..    Abdomen and Pelvis:    Liver: Unremarkable. No suspicious masses. No hepatic steatosis.     Gallbladder/biliary tree: Gallbladder is surgically absent. Stable  common bile duct dilatation.    Spleen: Unremarkable.    Pancreas: Unremarkable. No mass or ductal dilatation.    Adrenal glands: Unremarkable.    Kidneys: Right nephrectomy. No suspicious lesion in the surgical bed.  Unremarkable left kidney. No hydronephrosis. Stable 1.5 cm  peripherally calcified aneurysm adjacent to " the left upper pole.    Bowel: Small hiatal hernia. Normal appendix. No obstruction.    Retroperitoneum: Atherosclerotic calcifications of the aorta and its  major branches..  No bulky lymphadenopathy.    Pelvis: Urinary bladder is unremarkable.  Uterus and adnexa are within  normal limits.    Bones: Moderate multilevel degenerative changes of the spine. No  suspicious lesions. Stable exostosis at the left proximal femur.    Soft Tissues: Unremarkable.      Impression    IMPRESSION:    1.  Stable right nephrectomy. No convincing evidence of recurrent or new metastatic disease.  2.  Decreased size of pleural-based lesion in the posterior left hemithorax.  3.  Stable multinodular thyroid.    STEFANI RAO DO         SYSTEM ID:  [id number]     *Note: Due to a large number of results and/or encounters for the requested time period, some results have not been displayed. A complete set of results can be found in Results Review.     Cytology non gyn: [id number]  Order: [id number]  Collected 9/13/2024 11:45 AM       Status: Final result       Visible to patient: Yes (seen)       Dx: Urothelial carcinoma (H)    0 Result Notes      Component    Final Diagnosis   Specimen A                 Interpretation:                  Negative for High Grade Urothelial Carcinoma                 Other Findings:                  Crystals present.                 Adequacy:                 Satisfactory for evaluation         Electronically signed by Alejandro Morton III, MD on 9/16/2024 at  1:54 PM   Clinical Information    91          I reviewed the above labs and imaging today.      ASSESSMENT AND PLAN: Ms. Oviedo is a delightful 89 year old lady with localized stage IV ([id number]) high-grade, muscle-invasive transitional cell carcinoma of right renal pelvis, status post right robotic nephroureterectomy and 4 cycles of adjuvant carbo/gem; as well as NMIBC.      Dimple continues to remain in good health. She has no new reported symptoms in history.  We spent time reviewing her CT CAP from 3/15/24. She has a stable pleural based lung change and a stable thyroid nodule when compared to 09/13/23 scan. There previously was a small FDG uptake in the hepatic flexure of the colon. There are no bowel lesions depicted in her scan from 3/15/24.   - Please see my note from 09/2023 regarding biopsying the lesions. Overall, decision was to continue with surveillance with CT CAP every 6 months. If she has any symptoms from her lesion we could consider radiation even without a biopsy.    1. Upper tract urothelial cancer:   - She completed 4 cycles of adjuvant carboplatin plus gemcitabine chemotherapy per POUT trial.    She completed adjuvant chemotherapy in Feb 2019.  - No residual side effects or evidence of recurrence.  - Reviewed restaging CT scan9/13/24 as above; there is no clear evidence of disease recurrence in chest/abd/pelvis.   She continues to have pulmonary nodules which remain stable and a stable pleural nodule in left lower lobe.   Urine for cytology is again negative for recurrent disease. FISH by [facility] remains pending at this time  Surveillance as above at this time.         2. High grade urothelial carcinoma in situ:  - Bx proven carcinoma in situ in 1/2020, completed the first round of intravesical BCG treatment 2/2020-3/2020 and second in 11/2020-12/2020.  - She was initially followed by Dr. King and now is under care of Dr. Henry.   - She did have urine cytology and is negative; FISH by [facility] is pending    4. HERMAN:  - She follows Dr. Griffith in Cardiology for her h/o moderate AORTIC STENOSIS, CHF, and Afib with SOA and lymphedema with no cancer-related etiology evident. Appears stable today.     Return to clinic in 6 months with restaging scans with me .     The longitudinal plan of care for the diagnosis(es)/condition(s) as documented were addressed during this visit. Due to the added complexity in care, I will continue to support Dimple in  the subsequent management and with ongoing continuity of care.     30 minutes spent on the date of the encounter doing chart review, history and exam, documentation and further activities as noted above

## 2024-09-17 NOTE — LETTER
2024      RE: Dimple Oviedo  [address]  [facility] [address]       Dear Colleague,    Thank you for the opportunity to participate in the care of your patient, Dimple Oviedo, at the [facility] [place] [facility]. Please see a copy of my visit note below.        ELECTROPHYSIOLOGY CLINIC VISIT    Assessment/Recommendations   Assessment/Plan:    Dimple Oviedo is a 89 year old female with past medical history significant for urothelial carcinoma, hyperlipidemia, GERD, melanoma, HTN, CHF, lymphedema, hyperthyroidism and atrial fibrillation.     Permanent Atrial Fibrillation   We discussed in detail with the patient management/treatment options for A.fib includin. Stroke Prophylaxis: CHADSVASC= 4 ++age, +female, +htn 4, corresponding to a 4.0% annual stroke / systemic emolism event rate. indicating need for long term oral anticoagulation. Continue Xarelto 15 mg daily (CrCl 46 mL/min).   2. Rate Control: Rates mid 70s during visit. Continue Propanolol and Diltiazem.   3. Risk Factor Management: Statin, BP control, and JESS evaluation.  Continue lovastatin 40 mg daily. BP today 134/81. Echo done 23 with normal LV function, LVEF 55-60%. Normal RV function. PASP 48, severe left atrial enlargement and dilation of IVC. Weight appears stable. Lasix temporarily increase following echo, now taking 20 mg in am, and afternoon.     Establish with general cardiology.   Follow up with EP as needed.      History of Present Illness/Subjective    Ms. Dimple Oviedo is a 89 year old female who comes in today for EP follow-up of AF.    Patient is a 89 yo female with past medical history significant for urothelial carcinoma, hyperlipidemia, GERD, melanoma, HTN, CHF, lymphedema, hyperthyroidism and atrial fibrillation. She presented to the ER in 2017 with chest pain and SOB, found to have elevated troponin. Echocardiogram  reveals reduced EF, 30%, no ischemic changes on ECG, thought to likely be d/t stress cardiomyopathy. Repeat echo done 1/17/18 with improvement in LVEF to > 65%. She has continued to follow with Dr Griffith annually. She has remained in A fin since September 2021. At visit with Dr Griffith in 2021 diltiazem increased due to A fib rates ~100 bpm. At follow up last year, 2022, ventricular rates improved to ~85bpm.      EP Visit 12/7/23: She reports feeling well. Her main concern today is dark emesis that has been occurring about once a month for the past 3 months. She was evaluated in the ER for this on 11/13/23, hemoglobin 13, 4 hour recheck stable. Workup otherwise unremarkable. Endoscopy done 11/29, she had been given the results yet. Echo done today with normal LV function, LVEF 55-60%. Normal RV function. PASP 48, severe left atrial enlargement and dilation of IVC. Last episode of emesis yesterday morning. She denies chest discomfort, worsening shortness of breath. She feels peripheral edema is at her baseline with her lymphedema. Her daughter voices concern about in home services to assist patient with thing such as putting on compression socks. Presenting 12 lead ECG shows A fib Vent Rate 86 bpm, QRS 96 ms, QTc 437 ms.     She presents today for follow up. She has been feeling well. She was seen by GI after out last visit, EGD results with hiatal hernia but otherwise normal. Recommended continued PPI, can stop iron supplements with recheck hgb in 3 months. Shortness of breath has been stable, she is able to do about seven labs around her assisted living. She has been trying to eat less sodium, but reports its hard since she does eat some pre made meals through her facility. She denies chest discomfort, worsening peripheral edema or shortness of breath, lightheadedness, dizziness, pre-syncope, or syncope. Presenting 12 lead ECG shows AF Vent Rate 74 bpm,  ms, QTc 430 ms.     I have reviewed and updated the  patient's Past Medical History, Social History, Family History and Medication List.     Cardiographics (Personally Reviewed) :   Echo: 12/07/2023   Interpretation Summary  Global and regional left ventricular function is normal with an EF of 55-60%.  Global right ventricular function is normal.  Mild aortic insufficiency.  Moderate tricuspid insufficiency.  Elevated pulmonary artery systolic pressure, 48 mmHg.  Dilation of the inferior vena cava is present with normal respiratory  variation in diameter.  This study was compared with the study from 9/2/21. No change in ventricular  function; AI and TR are worse and estimated PA pressure is higher.     Echo: 9/02/2021   Interpretation Summary  The patient's rhythm is atrial fibrillation.  Global and regional left ventricular function is normal with an EF of 55-60%.  RV is not well seen, fxn is probably normal to mildly reduced.  No significant valvular abnormalities present.  IVC diameter and respiratory changes fall into an intermediate range  suggesting an RA pressure of 8 mmHg.  No pericardial effusion is present.  There has been no change.     Coronary Angiogram/[facility]: 12/13/17          Physical Examination   /81 (BP Location: Right arm, Patient Position: Chair, Cuff Size: Adult Large)   Pulse 73   Wt 81.1 kg (178 lb 14.4 oz)   SpO2 94%   BMI 37.39 kg/m    Wt Readings from Last 3 Encounters:   09/17/24 80.7 kg (178 lb)   09/17/24 81.1 kg (178 lb 14.4 oz)   07/02/24 80.6 kg (177 lb 12.8 oz)     General Appearance:   Alert, well-appearing and in no acute distress.   HEENT: Atraumatic, normocephalic. MMM.   Chest/Lungs:   Respirations unlabored.  Lungs are clear to auscultation.   Cardiovascular:   Regular rate and irregular rhythm.  S1/S2. No murmur.    Abdomen:  Soft, nontender, nondistended.   Extremities: No cyanosis or clubbing. Bilateral non pitting edema.    Musculoskeletal: Moves all extremities.     Skin: Warm, dry, intact.    Neurologic: Mood and  affect are appropriate.  Alert and oriented to person, place, time, and situation.          Medications  Allergies   Current Outpatient Medications   Medication Sig Dispense Refill     alendronate (FOSAMAX) 70 MG tablet TAKE 1 TABLET EVERY 7 DAYS AT LEAST 60 MINUTES BEFORE BREAKFAST AS DIRECTED. 12 tablet 3     diltiazem ER (DILT-XR) 180 MG 24 hr capsule Take 1 capsule (180 mg) by mouth daily 90 capsule 3     ferrous sulfate 325 (65 Fe) MG TBEC EC tablet Take 325 mg by mouth every morning        furosemide (LASIX) 20 MG tablet TAKE 1 TABLET TWICE DAILY IN THE MORNING AND AFTER LUNCH 180 tablet 3     irbesartan (AVAPRO) 300 MG tablet TAKE 1 TABLET EVERY DAY 90 tablet 3     lovastatin (MEVACOR) 40 MG tablet TAKE 1 TABLET AT BEDTIME (SCHEDULE ANNUAL VISIT, NEED FASTING LABS) 90 tablet 1     methimazole (TAPAZOLE) 5 MG tablet TAKE 1 TABLET ALTERNATING WITH 1/2 TABLET EVERY OTHER DAY 66 tablet 3     Omega-3 Fatty Acids (FISH OIL) 500 MG CAPS Take 1 capsule by mouth every morning        omeprazole (PRILOSEC) 20 MG DR capsule TAKE 1 CAPSULE EVERY DAY 90 capsule 1     propranolol ER (INDERAL LA) 60 MG 24 hr capsule TAKE 1 CAPSULE EVERY DAY 90 capsule 2     RESTASIS 0.05 % ophthalmic emulsion        rivaroxaban ANTICOAGULANT (XARELTO) 15 MG TABS tablet Take 1 tablet (15 mg) by mouth daily (with dinner) 90 tablet 3     sertraline (ZOLOFT) 100 MG tablet TAKE 1 TABLET EVERY MORNING 90 tablet 1     traZODone (DESYREL) 50 MG tablet TAKE 1/2 TABLET AT BEDTIME 45 tablet 3     Current Facility-Administered Medications   Medication Dose Route Frequency Provider Last Rate Last Admin     lidocaine (XYLOCAINE) 2 % external gel   Urethral Once Jsutin Henry MD          No Known Allergies      Lab Results (Personally Reviewed)    Chemistry/lipid CBC Cardiac Enzymes/BNP/TSH/INR   Lab Results   Component Value Date    BUN 19.7 12/18/2023     12/18/2023    CO2 29 12/18/2023     Creatinine   Date Value Ref Range Status    09/13/2024 1.01 (H) 0.51 - 0.95 mg/dL Final   05/11/2021 1.04 0.52 - 1.04 mg/dL Final     Creatinine POCT   Date Value Ref Range Status   09/13/2024 1.1 (H) 0.5 - 1.0 mg/dL Final       Lab Results   Component Value Date    CHOL 150 11/08/2023    HDL 46 (L) 11/08/2023    LDL 76 11/08/2023      Lab Results   Component Value Date    WBC 10.0 03/15/2024    HGB 12.9 03/15/2024    HCT 40.6 03/15/2024    MCV 93 03/15/2024     03/15/2024    Lab Results   Component Value Date    TSH 3.17 05/28/2024    INR 2.57 (H) 11/13/2023        The patient states understanding and is agreeable with the plan.     Chandrika Camp PA-C  [facility]  Electrophysiology Consult Service  Pager: [phone]    I spent a total of 20 minutes face to face with Dimple Oviedo during today's office visit. I have spent an additional 15 minutes today on chart review and documentation.                     Please do not hesitate to contact me if you have any questions/concerns.     Sincerely,     Chandrika Camp PA-C

## 2024-09-17 NOTE — NURSING NOTE
Chief Complaint   Patient presents with    Follow Up     RETURN EP       Vitals were taken, medications reconciled, and EKG was performed.    Brian Tony, EMT  1:40 PM

## 2024-09-17 NOTE — PATIENT INSTRUCTIONS
You were seen in the Electrophysiology Clinic today by: Chandrika SHAH    Plan:       Follow up Visit:   1 year with echo prior   Referral to General cardiology for lower extremity swelling and mild pulmonary hypertension         If you have further questions, please utilize [employer] to contact us.     Your Care Team:    EP Cardiology   Telephone Number     Nurse Line  Lianna Carlisle, RN   Gladys Joel, RN  Marcelo Schofield RN   [phone]     For scheduling appointments:   All   [phone]   For procedure scheduling:    Lenora Ramos     [phone]   For the Device Clinic (Pacemakers, ICDs, Loop Recorders)    During business hours: [phone]  After business hours:   [phone]- select option 4 and ask for job code [id number].       On-call cardiologist for after hours or on weekends:   [phone], option #4, and ask to speak to the on-call cardiologist.     Cardiovascular Clinic:   [address] [place] [address]. [place] [address]      As always, Thank you for trusting us with your health care needs!

## 2024-09-17 NOTE — NURSING NOTE
"Oncology Rooming Note    September 17, 2024 2:10 PM   Dimple Oviedo is a 89 year old female who presents for:    Chief Complaint   Patient presents with    Oncology Clinic Visit     RTN for Urothelial Cancer     Initial Vitals: /81 (BP Location: Right arm, Patient Position: Right side, Cuff Size: Adult Large)   Pulse 73   Wt 80.7 kg (178 lb)   SpO2 94%   BMI 37.20 kg/m   Estimated body mass index is 37.2 kg/m  as calculated from the following:    Height as of 7/2/24: 1.473 m (4' 10\").    Weight as of this encounter: 80.7 kg (178 lb). Body surface area is 1.82 meters squared.  No Pain (0) Comment: Data Unavailable   No LMP recorded. Patient is postmenopausal.  Allergies reviewed: Yes  Medications reviewed: Yes    Medications: Medication refills not needed today.  Pharmacy name entered into [facility]:    [facility] - [facility] [address] [facility] [place] - [place], [place] [address] [facility] [place], [place] - [address] [place] [address] [phone]  [id number] - [place], [place] - [address] AT [facility] [address]    Frailty Screening:   Is the patient here for a new oncology consult visit in cancer care? 2. No      Clinical concerns: none       Lolly Irizarry MA             "

## 2024-09-18 LAB
ATRIAL RATE - MUSE: 97 BPM
DIASTOLIC BLOOD PRESSURE - MUSE: NORMAL MMHG
INTERPRETATION ECG - MUSE: NORMAL
P AXIS - MUSE: NORMAL DEGREES
PR INTERVAL - MUSE: NORMAL MS
QRS DURATION - MUSE: 112 MS
QT - MUSE: 388 MS
QTC - MUSE: 430 MS
R AXIS - MUSE: -59 DEGREES
SYSTOLIC BLOOD PRESSURE - MUSE: NORMAL MMHG
T AXIS - MUSE: 61 DEGREES
VENTRICULAR RATE- MUSE: 74 BPM

## 2024-10-08 DIAGNOSIS — E78.5 HYPERLIPIDEMIA LDL GOAL <130: ICD-10-CM

## 2024-10-08 RX ORDER — LOVASTATIN 40 MG/1
TABLET ORAL
Qty: 90 TABLET | Refills: 3 | Status: SHIPPED | OUTPATIENT
Start: 2024-10-08

## 2024-10-18 ENCOUNTER — NURSE TRIAGE (OUTPATIENT)
Dept: FAMILY MEDICINE | Facility: CLINIC | Age: 89
End: 2024-10-18
Payer: MEDICARE

## 2024-10-18 NOTE — TELEPHONE ENCOUNTER
Patient calling and reports over the last few weeks, has been short of breath with activity/exertion. Patient reports no shortness of breath at rest, or laying down flat. Patient reports after becoming shortness of breath, takes some time to regulate breathing back to normal. Patient denies chest pain, dizziness, coughing, lightheaded, weak or dizziness. Patient able to check oxygen saturation and 94% on room air. Due to red flag symptom of shortness of breath, recommended patient to go to the ER. Patient has extensive cardiac history of HF, NSTEMI, a fib with rapid ventricular rate, cardiomyopathy, and lymphedema.     Disposition: GO TO ED NOW: Patient agrees to plan of care and will have her son drive her.    Reason for Disposition   MODERATE difficulty breathing (e.g., speaks in phrases, SOB even at rest, pulse 100-120) of new-onset or worse than normal    Additional Information   Negative: SEVERE difficulty breathing (e.g., struggling for each breath, speaks in single words, pulse > 120)   Negative: Breathing stopped and hasn't returned   Negative: Choking on something   Negative: Bluish (or gray) lips or face   Negative: Difficult to awaken or acting confused (e.g., disoriented, slurred speech)   Negative: Passed out (i.e., fainted, collapsed and was not responding)   Negative: Wheezing started suddenly after medicine, an allergic food, or bee sting   Negative: Stridor (harsh sound while breathing in)   Negative: Slow, shallow and weak breathing   Negative: Sounds like a life-threatening emergency to the triager   Negative: Chest pain   Negative: Wheezing (high pitched whistling sound) and previous asthma attacks or use of asthma medicines   Negative: Difficulty breathing and within 14 days of COVID-19 Exposure   Negative: Difficulty breathing and only present when coughing   Negative: Difficulty breathing and only from stuffy nose   Negative: Difficulty breathing and only from stuffy nose or runny nose from  "common cold    Answer Assessment - Initial Assessment Questions  1. RESPIRATORY STATUS: \"Describe your breathing?\" (e.g., wheezing, shortness of breath, unable to speak, severe coughing)       Patient states not short of breath while laying, sitting or sleeping. Patient feeling winded while ambulating  2. ONSET: \"When did this breathing problem begin?\"       2-3 weeks ago  3. PATTERN \"Does the difficult breathing come and go, or has it been constant since it started?\"       Comes and goes. Worse with any type of exertion. Takes patient awhile to catch breath after ambulating  4. SEVERITY: \"How bad is your breathing?\" (e.g., mild, moderate, severe)     - MILD: No SOB at rest, mild SOB with walking, speaks normally in sentences, can lie down, no retractions, pulse < 100.     - MODERATE: SOB at rest, SOB with minimal exertion and prefers to sit, cannot lie down flat, speaks in phrases, mild retractions, audible wheezing, pulse 100-120.     - SEVERE: Very SOB at rest, speaks in single words, struggling to breathe, sitting hunched forward, retractions, pulse > 120       Mild/moderate  5. RECURRENT SYMPTOM: \"Have you had difficulty breathing before?\" If Yes, ask: \"When was the last time?\" and \"What happened that time?\"       Has become worse over time.   6. CARDIAC HISTORY: \"Do you have any history of heart disease?\" (e.g., heart attack, angina, bypass surgery, angioplasty)       Patient has had a NSTEMI in 2017, heart failure, afib  7. LUNG HISTORY: \"Do you have any history of lung disease?\"  (e.g., pulmonary embolus, asthma, emphysema)      No lung history  8. CAUSE: \"What do you think is causing the breathing problem?\"       Patient not sure  9. OTHER SYMPTOMS: \"Do you have any other symptoms? (e.g., dizziness, runny nose, cough, chest pain, fever)      No other symptoms noted.  10. O2 SATURATION MONITOR:  \"Do you use an oxygen saturation monitor (pulse oximeter) at home?\" If Yes, ask: \"What is your reading (oxygen " "level) today?\" \"What is your usual oxygen saturation reading?\" (e.g., 95%)        94% on room air at rest  11. PREGNANCY: \"Is there any chance you are pregnant?\" \"When was your last menstrual period?\"        NA  12. TRAVEL: \"Have you traveled out of the country in the last month?\" (e.g., travel history, exposures)        NA    Protocols used: Breathing Difficulty-A-OH      Josseline Car RN    "

## 2024-11-20 DIAGNOSIS — E04.2 NONTOXIC MULTINODULAR GOITER: ICD-10-CM

## 2024-11-20 DIAGNOSIS — I10 HYPERTENSION GOAL BP (BLOOD PRESSURE) < 140/90: ICD-10-CM

## 2024-11-20 RX ORDER — PROPRANOLOL HYDROCHLORIDE 60 MG/1
CAPSULE, EXTENDED RELEASE ORAL
Qty: 90 CAPSULE | Refills: 1 | Status: SHIPPED | OUTPATIENT
Start: 2024-11-20

## 2024-12-09 DIAGNOSIS — I48.20 CHRONIC ATRIAL FIBRILLATION (H): ICD-10-CM

## 2024-12-10 RX ORDER — DILTIAZEM HYDROCHLORIDE 180 MG/1
180 CAPSULE, EXTENDED RELEASE ORAL DAILY
Qty: 90 CAPSULE | Refills: 3 | Status: SHIPPED | OUTPATIENT
Start: 2024-12-10

## 2024-12-13 ENCOUNTER — LAB (OUTPATIENT)
Dept: LAB | Facility: CLINIC | Age: 89
End: 2024-12-13
Payer: MEDICARE

## 2024-12-13 DIAGNOSIS — E05.00 GRAVES DISEASE: ICD-10-CM

## 2024-12-13 LAB
T3 SERPL-MCNC: 84 NG/DL (ref 85–202)
T4 FREE SERPL-MCNC: 0.81 NG/DL (ref 0.9–1.7)
TSH SERPL DL<=0.005 MIU/L-ACNC: 3.12 UIU/ML (ref 0.3–4.2)

## 2024-12-13 PROCEDURE — 84439 ASSAY OF FREE THYROXINE: CPT | Performed by: PATHOLOGY

## 2024-12-13 PROCEDURE — 84480 ASSAY TRIIODOTHYRONINE (T3): CPT | Performed by: INTERNAL MEDICINE

## 2024-12-13 PROCEDURE — 84443 ASSAY THYROID STIM HORMONE: CPT | Performed by: PATHOLOGY

## 2024-12-13 PROCEDURE — 36415 COLL VENOUS BLD VENIPUNCTURE: CPT | Performed by: PATHOLOGY

## 2024-12-13 PROCEDURE — 99000 SPECIMEN HANDLING OFFICE-LAB: CPT | Performed by: PATHOLOGY

## 2024-12-16 ENCOUNTER — OFFICE VISIT (OUTPATIENT)
Dept: CARDIOLOGY | Facility: CLINIC | Age: 89
End: 2024-12-16
Payer: MEDICARE

## 2024-12-16 VITALS
WEIGHT: 177.9 LBS | SYSTOLIC BLOOD PRESSURE: 124 MMHG | HEART RATE: 88 BPM | OXYGEN SATURATION: 94 % | BODY MASS INDEX: 37.18 KG/M2 | DIASTOLIC BLOOD PRESSURE: 74 MMHG

## 2024-12-16 DIAGNOSIS — I50.22 CHRONIC SYSTOLIC HEART FAILURE (H): ICD-10-CM

## 2024-12-16 DIAGNOSIS — I27.20 MILD PULMONARY HYPERTENSION (H): ICD-10-CM

## 2024-12-16 DIAGNOSIS — M79.89 SWELLING OF LOWER EXTREMITY: ICD-10-CM

## 2024-12-16 ASSESSMENT — PAIN SCALES - GENERAL: PAINLEVEL_OUTOF10: NO PAIN (0)

## 2024-12-16 NOTE — PROGRESS NOTES
History:    Dimple Oviedo is a very pleasant 89 year old woman who is referred to establish care for her multiple cardiac issues.     Dimple Oviedo is a 89 year old female with past medical history significant for urothelial carcinoma, hyperlipidemia, GERD, melanoma, HTN, CHF, lymphedema, hyperthyroidism and atrial fibrillation.      Permanent Atrial Fibrillation    CHADSVASC= 4 ++age, +female, +htn 4, Xarelto 15 mg daily (CrCl 46 mL/min).   Rate Control: Propanolol and Diltiazem.  HTN  Dyslipidemia      Patient reports stable dyspnea on exertion. Denies chest pain,  orthopnea, paroxysmal nocturnal dyspnea, palpitations or syncope.      Current Outpatient Medications   Medication Sig Dispense Refill    alendronate (FOSAMAX) 70 MG tablet TAKE 1 TABLET EVERY 7 DAYS AT LEAST 60 MINUTES BEFORE BREAKFAST AS DIRECTED. 12 tablet 3    diltiazem ER (DILT-XR) 180 MG 24 hr capsule TAKE 1 CAPSULE EVERY DAY 90 capsule 3    ferrous sulfate 325 (65 Fe) MG TBEC EC tablet Take 325 mg by mouth every morning       furosemide (LASIX) 20 MG tablet TAKE 1 TABLET TWICE DAILY IN THE MORNING AND AFTER LUNCH 180 tablet 3    irbesartan (AVAPRO) 300 MG tablet TAKE 1 TABLET EVERY DAY 90 tablet 3    lovastatin (MEVACOR) 40 MG tablet TAKE 1 TABLET AT BEDTIME (SCHEDULE ANNUAL VISIT, NEED FASTING LABS) 90 tablet 3    methimazole (TAPAZOLE) 5 MG tablet TAKE 1 TABLET ALTERNATING WITH 1/2 TABLET EVERY OTHER DAY 66 tablet 3    Omega-3 Fatty Acids (FISH OIL) 500 MG CAPS Take 1 capsule by mouth every morning       omeprazole (PRILOSEC) 20 MG DR capsule TAKE 1 CAPSULE EVERY DAY 90 capsule 1    propranolol ER (INDERAL LA) 60 MG 24 hr capsule TAKE 1 CAPSULE EVERY DAY 90 capsule 1    RESTASIS 0.05 % ophthalmic emulsion       rivaroxaban ANTICOAGULANT (XARELTO) 15 MG TABS tablet Take 1 tablet (15 mg) by mouth daily (with dinner) 90 tablet 3    rOPINIRole (REQUIP) 0.25 MG tablet Take 1 tablet in the after noon and 2 tablets at night. 270 tablet 1     sertraline (ZOLOFT) 100 MG tablet TAKE 1 TABLET EVERY MORNING 90 tablet 1    traZODone (DESYREL) 50 MG tablet TAKE 1/2 TABLET AT BEDTIME 45 tablet 3       Allergies - reviewed   No Known Allergies    Past history -reviewed  Past Medical History:   Diagnosis Date    CRESENCIO (acute kidney injury) (H) 9/11/2018    Arthritis     Asthma 2/9/2022    Calculus of kidney     Chemotherapy-induced neutropenia (H) 3/6/2019    Congestive heart failure (H)     Esophageal reflux     GERD (gastroesophageal reflux disease)     Hyperlipidemia LDL goal <130 5/9/2010    Malignant melanoma of skin of trunk, except scrotum (H)     Nonspecific abnormal finding     has living will 2004 -     Nontoxic multinodular goiter     no further eval /tx rec per pt    Osteopenia     Other psoriasis     Personal history of colonic polyps     PMR (polymyalgia rheumatica) (H)     Restless legs syndrome 10/12/2005    Stress-induced cardiomyopathy     Undiagnosed cardiac murmurs     Unspecified constipation     Unspecified essential hypertension     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        Social history - reviewed  Social History     Tobacco Use    Smoking status: Never     Passive exposure: Never    Smokeless tobacco: Never   Vaping Use    Vaping status: Never Used   Substance Use Topics    Alcohol use: No     Alcohol/week: 0.0 standard drinks of alcohol     Comment: rare    Drug use: No       Family history -reviewed  Family History   Problem Relation Age of Onset    Cancer Father         dec - esophageal and laryngeal    Heart Disease Mother     Respiratory Mother         dec    Breast Cancer Daughter     Other Cancer Daughter     Thyroid Disease Daughter     Asthma Daughter     Hyperlipidemia Son     Diabetes Son     Anesthesia Reaction No family hx of     Deep Vein Thrombosis (DVT) No family hx of        ROS: non contributory on the 10-point review of system    Exam:     There were no vitals taken for this visit.  In general, the patient is in no  apparent distress.      HEENT: Sclerae white, not injected.    Neck: No JVD. No thyromegaly  Heart: RRR. Normal S1, S2. No murmur, rub, click, or gallop.    Lungs: Clear bilaterally.  No rhonchi, wheezes, rales.   Extremities: No edema.  The pulses are 2+at the radial bilaterally.      I have independently reviewed this patient's relevant laboratory and cardiac data :    Recent Labs   Lab Test 11/08/23  1431 12/09/22  1000   CHOL 150 153   HDL 46* 47*   LDL 76 80   TRIG 140 130      Recent Labs   Lab Test 09/13/24  1150 11/08/23  1431   AST 41 22     Recent Labs   Lab Test 09/13/24  1150 11/14/23  0022   ALT 31 21     Recent Labs   Lab Test 09/13/24  1150 09/13/24  1121 03/15/24  1143 12/18/23  1548     --   --  139   POTASSIUM 5.0  --   --  4.6   CHLORIDE 99  --   --  99   CO2 23  --   --  29   ANIONGAP 13  --   --  11   BUN 20.2  --   --  19.7   CR 1.01*  1.01* 1.1*   < > 1.04*    < > = values in this interval not displayed.     Recent Labs   Lab Test 03/15/24  1232 12/18/23  1548   WBC 10.0 7.6   RBC 4.36 4.08   HGB 12.9 12.7   MCV 93 98    214     Recent Labs   Lab Test 11/08/23  1431 12/14/17  1152   A1C 6.1* 6.2*     Recent Labs   Lab Test 12/13/24  1038 05/28/24  1419   TSH 3.12 3.17       Echo: 12/07/2023   Interpretation Summary  Global and regional left ventricular function is normal with an EF of 55-60%.  Global right ventricular function is normal.  Mild aortic insufficiency.  Moderate tricuspid insufficiency.  Elevated pulmonary artery systolic pressure, 48 mmHg.  Dilation of the inferior vena cava is present with normal respiratory  variation in diameter.  This study was compared with the study from 9/2/21. No change in ventricular    Coronary Angiogram/[facility]: 12/13/17      Assessment and Plan:  89 year old patient with    Essential hypertension  Dyslipidemia  Normal biventricular function   Permanent atrial fibrillation    Dimple Oviedo has     Patient is without symptoms concerning for  angina or heart failure. Patient is euvolemic on exam today with a normal heart rate and blood pressure. Patient is in ** today. Patient has normal biventricular function. Moderate TR and pulmonary hypertension.       Recommendations:   Continue current medications.  Start Jardiance 10 mg daily  Follow up in 3 months      Anahi Sagastume MD, MS  Professor of Medicine  Cardiovascular Medicine

## 2024-12-16 NOTE — NURSING NOTE
Chief Complaint   Patient presents with    New Patient     NEW CARDIOLOGY     Vitals were taken and medications reconciled.    Stuart Cruz, EMT  10:58 AM

## 2024-12-16 NOTE — PATIENT INSTRUCTIONS
Plan:    Start Jardiance 10mg daily.  Follow up in 3 months      Your Care Team:         Cardiology   Telephone Number     Vinny Hunter RN [phone]    After business hours: [phone], ask for cardiologist on-call               Cardiovascular Clinic:   [address] [place] [address]. [place] [address]      As always, thank you for trusting us with your health care needs!

## 2024-12-24 ENCOUNTER — TELEPHONE (OUTPATIENT)
Dept: CARDIOLOGY | Facility: CLINIC | Age: 89
End: 2024-12-24
Payer: MEDICARE

## 2024-12-24 DIAGNOSIS — R06.00 DYSPNEA: ICD-10-CM

## 2024-12-24 DIAGNOSIS — I50.22 CHRONIC SYSTOLIC HEART FAILURE (H): ICD-10-CM

## 2024-12-24 DIAGNOSIS — I50.30 NYHA CLASS 3 HEART FAILURE WITH PRESERVED EJECTION FRACTION (H): Primary | ICD-10-CM

## 2024-12-24 NOTE — TELEPHONE ENCOUNTER
[facility] Call Center    Phone Message    May a detailed message be left on voicemail: yes     Reason for Call: Medication Refill Request    Has the patient contacted the pharmacy for the refill? Yes   Name of medication being requested: Jardiance 10 mg daily   Provider who prescribed the medication: Tanika   Pharmacy:    [facility] - [facility] [address] [facility]    Date medication is needed: need a prescription sent in. Prescribed on 12/16/24 but never sent to pharmacy.  And a call back      Action Taken: Other: cardiology    Travel Screening: Not Applicable    Thank you!  [facility]       Date of Service:

## 2025-01-04 DIAGNOSIS — K21.9 GASTROESOPHAGEAL REFLUX DISEASE WITHOUT ESOPHAGITIS: ICD-10-CM

## 2025-01-08 DIAGNOSIS — I48.20 CHRONIC ATRIAL FIBRILLATION (H): ICD-10-CM

## 2025-01-13 RX ORDER — RIVAROXABAN 15 MG/1
15 TABLET, FILM COATED ORAL
Qty: 90 TABLET | Refills: 3 | Status: SHIPPED | OUTPATIENT
Start: 2025-01-13

## 2025-01-14 NOTE — TELEPHONE ENCOUNTER
Medication Requested:  XARELTO ANTICOAGULANT 15 MG   Last Written Prescription Date:  1/29/24  Last Fill Quantity: 90,   # refills: 3  ----------------------  Last Office Visit : 12/6/24  Future Office visit:  4/21/25  Refill decision: Medication refilled per protocol  FYI  CARD POOL RN: 90 day refill sent   Over due labs  CR CR Clear,  GFR

## 2025-02-19 DIAGNOSIS — F41.1 GAD (GENERALIZED ANXIETY DISORDER): ICD-10-CM

## 2025-02-19 RX ORDER — SERTRALINE HYDROCHLORIDE 100 MG/1
100 TABLET, FILM COATED ORAL EVERY MORNING
Qty: 90 TABLET | Refills: 0 | Status: SHIPPED | OUTPATIENT
Start: 2025-02-19

## 2025-02-21 DIAGNOSIS — I50.22 CHRONIC SYSTOLIC HEART FAILURE (H): ICD-10-CM

## 2025-02-21 DIAGNOSIS — I50.30 NYHA CLASS 3 HEART FAILURE WITH PRESERVED EJECTION FRACTION (H): ICD-10-CM

## 2025-02-21 DIAGNOSIS — R06.00 DYSPNEA: ICD-10-CM

## 2025-02-21 NOTE — TELEPHONE ENCOUNTER
[facility] Call Center    Phone Message    May a detailed message be left on voicemail: yes     Reason for Call: Medication Refill Request    Has the patient contacted the pharmacy for the refill? Yes   Name of medication being requested: empagliflozin (JARDIANCE) 10 MG TABS tablet   Provider who prescribed the medication: Dr. Anahi Sagastume  Pharmacy:   [facility] MAIL DELIVERY - [facility] [address]     Date medication is needed: Pt has 1 weeks worth     Action Taken: Message routed to:  [facility] ([facility] cardio    Travel Screening: Not Applicable    Thank you!  Specialty Access Center       Date of Service:

## 2025-02-22 NOTE — TELEPHONE ENCOUNTER
empagliflozin (JARDIANCE) 10 MG TABS tablet 90 tablet 1 12/24/2024 -- --   Sig - Route: Take 1 tablet (10 mg) by mouth daily. - Oral     Anahi Sagastume MD  Cardiovascular Disease  Lv 12/16/24 [facility]  Nv  4/21/25      Per pt call needs med soon.  Pharm states med comes in 30 tab bottles, she probably has another bottle at home.Pt insisted she only got 30 tabs as DR wanted it as trial. Advised to call pharm to clarify.She agrees. Will call back of she needs any help.

## 2025-03-13 DIAGNOSIS — E04.2 NONTOXIC MULTINODULAR GOITER: ICD-10-CM

## 2025-03-13 DIAGNOSIS — E05.00 GRAVES DISEASE: ICD-10-CM

## 2025-03-19 RX ORDER — METHIMAZOLE 5 MG/1
TABLET ORAL
Qty: 66 TABLET | Refills: 3 | Status: SHIPPED | OUTPATIENT
Start: 2025-03-19

## 2025-03-19 NOTE — TELEPHONE ENCOUNTER
methIMAzole Oral Tablet 5 MG   Last Written Prescription:  5/7/24  #66, 3 refills  ----------------------  Last Visit Date: 12/13/24  Future Visit Date: 11/11/25  ----------------------  Refill decision: Medication unable to be refilled by RN due to: Medication not included in refill protocol policy         Request from pharmacy:  Requested Prescriptions   Pending Prescriptions Disp Refills    methimazole (TAPAZOLE) 5 MG tablet [Pharmacy Med Name: methIMAzole Oral Tablet 5 MG] 66 tablet 3     Sig: TAKE 1 TABLET ALTERNATING WITH 1/2 TABLET EVERY OTHER DAY       Anti-Thyroid Agents Protocol Passed - 3/19/2025  8:49 AM                     Passed - Recent (12 month) or future (90 days) visit with authorizing provider s specialty     The patient must have completed an in-person or virtual visit within the past 12 months or has a future visit scheduled within the next 90 days with the authorizing provider s specialty.  Urgent care and e-visits do not qualify as an office visit for this protocol.          Passed - Medication indicated for associated diagnosis     The medication is prescribed for one or more of the following conditions:        Hyperthyroidism       Disorder of thyroid gland       Thyrotoxicosis       Thyroid storm       Thyroid eye disease            Passed - Patient is age 18 or older        Passed - Normal TSH in past 12 months     Recent Labs   Lab Test 12/13/24  1038   TSH 3.12              Passed - No active pregnancy on record        Passed - No positive pregnancy test in past 12 months

## 2025-03-24 DIAGNOSIS — K21.9 GASTROESOPHAGEAL REFLUX DISEASE WITHOUT ESOPHAGITIS: ICD-10-CM

## 2025-03-25 RX ORDER — OMEPRAZOLE 20 MG/1
20 CAPSULE, DELAYED RELEASE ORAL DAILY
Qty: 90 CAPSULE | Refills: 3 | Status: SHIPPED | OUTPATIENT
Start: 2025-03-25

## 2025-04-07 DIAGNOSIS — G25.81 RESTLESS LEGS SYNDROME: ICD-10-CM

## 2025-04-07 RX ORDER — ROPINIROLE 0.25 MG/1
TABLET, FILM COATED ORAL
Qty: 270 TABLET | Refills: 0 | Status: SHIPPED | OUTPATIENT
Start: 2025-04-07

## 2025-04-14 DIAGNOSIS — G47.00 INSOMNIA, UNSPECIFIED TYPE: ICD-10-CM

## 2025-04-14 RX ORDER — TRAZODONE HYDROCHLORIDE 50 MG/1
TABLET ORAL
Qty: 45 TABLET | Refills: 0 | Status: SHIPPED | OUTPATIENT
Start: 2025-04-14

## 2025-04-16 ENCOUNTER — TELEPHONE (OUTPATIENT)
Dept: CARDIOLOGY | Facility: CLINIC | Age: OVER 89
End: 2025-04-16
Payer: MEDICARE

## 2025-04-16 DIAGNOSIS — I87.303 STASIS EDEMA OF BOTH LOWER EXTREMITIES: ICD-10-CM

## 2025-04-16 NOTE — TELEPHONE ENCOUNTER
Left Voicemail (1st Attempt) for the patient to call back and schedule the following:    Appointment type:  rtn cardiololgy   Provider: farhana   Return date: 04/21/25  Specialty phone number: [phone] opt 1   Additional appointment(s) needed: n/a   Additonal Notes: [email or web address]

## 2025-04-21 ENCOUNTER — OFFICE VISIT (OUTPATIENT)
Dept: CARDIOLOGY | Facility: CLINIC | Age: OVER 89
End: 2025-04-21
Attending: INTERNAL MEDICINE
Payer: MEDICARE

## 2025-04-21 VITALS
SYSTOLIC BLOOD PRESSURE: 125 MMHG | DIASTOLIC BLOOD PRESSURE: 71 MMHG | WEIGHT: 175.7 LBS | HEART RATE: 72 BPM | BODY MASS INDEX: 36.72 KG/M2 | OXYGEN SATURATION: 92 %

## 2025-04-21 DIAGNOSIS — I50.30 HEART FAILURE WITH PRESERVED EJECTION FRACTION, NYHA CLASS II (H): ICD-10-CM

## 2025-04-21 DIAGNOSIS — I48.20 CHRONIC ATRIAL FIBRILLATION (H): Primary | ICD-10-CM

## 2025-04-21 DIAGNOSIS — M79.89 SWELLING OF LOWER EXTREMITY: ICD-10-CM

## 2025-04-21 PROCEDURE — G0463 HOSPITAL OUTPT CLINIC VISIT: HCPCS | Performed by: INTERNAL MEDICINE

## 2025-04-21 RX ORDER — DAPAGLIFLOZIN 10 MG/1
10 TABLET, FILM COATED ORAL DAILY
Qty: 90 TABLET | Refills: 3 | Status: SHIPPED | OUTPATIENT
Start: 2025-04-21

## 2025-04-21 ASSESSMENT — PAIN SCALES - GENERAL: PAINLEVEL_OUTOF10: NO PAIN (0)

## 2025-04-21 NOTE — LETTER
4/21/2025      RE: Dimple Oviedo  [address]  [facility] [address]       Dear Colleague,    Thank you for the opportunity to participate in the care of your patient, Dimple Oviedo, at the [facility] [place] [facility]. Please see a copy of my visit note below.      History:    Dimple Oviedo is a very pleasant 89 year old woman who presents for HFpEF.     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   Melanoma  Lymphedema both legs  Coronary angiogram in 2017 - no significant disease    Patient is on chronic anticoagulation and takes Xarelto 15 mg daily for CHADSVASC= 4 ++age, +female, +htn 4,  (CrCl 46 mL/min). She is on rate control strategy with propanolol and diltiazem.    Echo from 2023 noted normal biventricular with moderate tricuspid regurgitation, mild aortic regurgitation with signs of pulmonary hypertension, RV systolic pressures 48 mmHg with dilated IVC.       Patient was started on Jardiance at her last visit in December 2024. States that she feels well overall. She has not noticed much improvement with the dyspnea and has not noticed increased urination after starting Jardiance, states that she is complaint with the medication. She has bilateral edema from lymphedema. She takes low dose lasix. She takes irbesartan for hypertension. Denies chest pain,  orthopnea, paroxysmal nocturnal dyspnea, palpitations or syncope.      Current Outpatient Medications   Medication Sig Dispense Refill     alendronate (FOSAMAX) 70 MG tablet TAKE 1 TABLET EVERY 7 DAYS AT LEAST 60 MINUTES BEFORE BREAKFAST AS DIRECTED. 12 tablet 3     diltiazem ER (DILT-XR) 180 MG 24 hr capsule TAKE 1  CAPSULE EVERY DAY 90 capsule 3     empagliflozin (JARDIANCE) 10 MG TABS tablet Take 1 tablet (10 mg) by mouth daily. 90 tablet 3     ferrous sulfate 325 (65 Fe) MG TBEC EC tablet Take 325 mg by mouth every morning  (Patient not taking: Reported on 12/16/2024)       furosemide (LASIX) 20 MG tablet TAKE 1 TABLET TWICE DAILY IN THE MORNING AND AFTER LUNCH 180 tablet 3     irbesartan (AVAPRO) 300 MG tablet TAKE 1 TABLET EVERY DAY 90 tablet 3     lovastatin (MEVACOR) 40 MG tablet TAKE 1 TABLET AT BEDTIME (SCHEDULE ANNUAL VISIT, NEED FASTING LABS) 90 tablet 3     methimazole (TAPAZOLE) 5 MG tablet TAKE 1 TABLET ALTERNATING WITH 1/2 TABLET EVERY OTHER DAY 66 tablet 3     Omega-3 Fatty Acids (FISH OIL) 500 MG CAPS Take 1 capsule by mouth every morning        omeprazole (PRILOSEC) 20 MG DR capsule TAKE 1 CAPSULE EVERY DAY 90 capsule 3     propranolol ER (INDERAL LA) 60 MG 24 hr capsule TAKE 1 CAPSULE EVERY DAY 90 capsule 1     RESTASIS 0.05 % ophthalmic emulsion        rOPINIRole (REQUIP) 0.25 MG tablet TAKE 1 TABLET IN THE AFTERNOON AND 2 TABLETS AT NIGHT 270 tablet 0     sertraline (ZOLOFT) 100 MG tablet TAKE 1 TABLET EVERY MORNING 90 tablet 0     traZODone (DESYREL) 50 MG tablet TAKE 1/2 TABLET AT BEDTIME 45 tablet 0     XARELTO ANTICOAGULANT 15 MG TABS tablet TAKE 1 TABLET (15 MG) BY MOUTH DAILY (WITH DINNER) 90 tablet 3       Allergies - reviewed   No Known Allergies    Past history -reviewed  Past Medical History:   Diagnosis Date     CRESENCIO (acute kidney injury) 9/11/2018     Arthritis      Asthma 2/9/2022     Calculus of kidney      Chemotherapy-induced neutropenia 3/6/2019     Congestive heart failure (H)      Esophageal reflux      GERD (gastroesophageal reflux disease)      Hyperlipidemia LDL goal <130 5/9/2010     Malignant melanoma of skin of trunk, except scrotum (H)      Nonspecific abnormal finding     has living will 2004 -      Nontoxic multinodular goiter     no further eval /tx rec per pt     Osteopenia       Other psoriasis      Personal history of colonic polyps      PMR (polymyalgia rheumatica)      Restless legs syndrome 10/12/2005     Stress-induced cardiomyopathy      Undiagnosed cardiac murmurs      Unspecified constipation      Unspecified essential hypertension      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      HEENT: Sclerae white, not injected.    Neck: No JVD. No thyromegaly  Heart: irregular. 2/6 systolic murmur RSB, no rub,or gallop.    Lungs: Clear bilaterally.  No rhonchi, wheezes, rales.   Extremities: swollen legs (non pitting).  The pulses are 2+at the radial bilaterally.      I have independently reviewed this patient's relevant laboratory and cardiac data :    Recent Labs   Lab Test 11/08/23  1431 12/09/22  1000   CHOL 150 153   HDL 46* 47*   LDL 76 80   TRIG 140 130      Recent Labs   Lab Test 09/13/24  1150 11/08/23  1431   AST 41 22     Recent Labs   Lab Test 09/13/24  1150 11/14/23  0022   ALT 31 21     Recent Labs   Lab Test 09/13/24  1150 12/18/23  1548    139   POTASSIUM 5.0 4.6   CHLORIDE 99 99   CO2 23 29   ANIONGAP 13 11   BUN 20.2 19.7   CR 1.01*  1.01* 1.04*    < > = values in this interval not displayed.     Recent Labs   Lab Test 03/15/24  1232 12/18/23  1548   WBC 10.0 7.6   RBC 4.36 4.08   HGB 12.9 12.7   MCV 93 98    214     Recent Labs   Lab Test 11/08/23  1431 12/14/17  1152   A1C 6.1* 6.2*     Recent Labs   Lab Test 12/13/24  1038 05/28/24  1419   TSH 3.12 3.17     ECG 9/17/24   Atrial fibrillation at 74     Echo: 12/07/2023   Global and regional left ventricular  function is normal with an EF of 55-60%.  Global right ventricular function is normal.  Mild aortic insufficiency.  Moderate tricuspid insufficiency.  Elevated pulmonary artery systolic pressure, 48 mmHg.  Dilation of the inferior vena cava.  No change in ventricular function; AI and TR are worse and estimated PA pressure is higher.    Coronary Angiogram/C: 12/13/17      Assessment and Plan:  89 year old patient with    Essential hypertension  Dyslipidemia  Normal biventricular function   Permanent atrial fibrillation  HFpEF    Dimple Oviedo has heart failure with preserved EF.  I would place her at NYHA functional Class 3, AHA/ACC Stage C. Patient is  overall euvolemic on exam today with a normal blood pressure. Patient is in rate controlled atrial fibrillation today. Patient has normal biventricular function with moderate TR and pulmonary hypertension.     Will continue SGLT2i (Class I/2a) for HFpEF, switch to Farxiga from Jardiance. Son who has accompanied her to the visit, feels that she is stable and doing well, so will defer MRA She was advised to contact us in the event she developed signs of HF.     Patient inquiring if she should continue iron supplements. Will check ferritin level along with a lipid panel (which she is due for since she is on statin).    Recommendations:   Continue current medications.  Start Farxiga 10 mg daily  Stop Jardiance 10 mg   Follow up in 12 months      Anahi Sagastume MD, MS  Professor of Medicine  Cardiovascular Medicine      Please do not hesitate to contact me if you have any questions/concerns.     Sincerely,     Anahi Sagastume MD

## 2025-04-21 NOTE — NURSING NOTE
Chief Complaint   Patient presents with    Follow Up     RETURN CARDIOLOGY     Vitals were taken and medications reconciled.    Stuart Cruz, SAIMA  12:24 PM

## 2025-04-21 NOTE — PATIENT INSTRUCTIONS
Plan:    Start Farxiga 10 mg daily  Stop Jardiance 10 mg   Follow up in 12 months      Your Care Team:         Cardiology   Telephone Number     Shantell Matamoros RN [phone]    After business hours: [phone], ask for cardiologist on-call           On-call cardiologist for after hours or on weekends:    [phone], option #4, and ask to speak to the on-call cardiologist.    Cardiovascular Clinic:   [address] [place] [address]. [place] [address]      As always, thank you for trusting us with your health care needs!    If you have further questions, please utilize [employer] to contact us.

## 2025-04-21 NOTE — PROGRESS NOTES
History:    Dimple Oviedo is a very pleasant 89 year old woman who presents for HFpEF.     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   Melanoma  Lymphedema both legs  Coronary angiogram in 2017 - no significant disease    Patient is on chronic anticoagulation and takes Xarelto 15 mg daily for CHADSVASC= 4 ++age, +female, +htn 4,  (CrCl 46 mL/min). She is on rate control strategy with propanolol and diltiazem.    Echo from 2023 noted normal biventricular with moderate tricuspid regurgitation, mild aortic regurgitation with signs of pulmonary hypertension, RV systolic pressures 48 mmHg with dilated IVC.       Patient was started on Jardiance at her last visit in December 2024. States that she feels well overall. She has not noticed much improvement with the dyspnea and has not noticed increased urination after starting Jardiance, states that she is complaint with the medication. She has bilateral edema from lymphedema. She takes low dose lasix. She takes irbesartan for hypertension. Denies chest pain,  orthopnea, paroxysmal nocturnal dyspnea, palpitations or syncope.      Current Outpatient Medications   Medication Sig Dispense Refill    alendronate (FOSAMAX) 70 MG tablet TAKE 1 TABLET EVERY 7 DAYS AT LEAST 60 MINUTES BEFORE BREAKFAST AS DIRECTED. 12 tablet 3    diltiazem ER (DILT-XR) 180 MG 24 hr capsule TAKE 1 CAPSULE EVERY DAY 90 capsule 3    empagliflozin (JARDIANCE) 10 MG TABS tablet Take 1 tablet (10 mg) by mouth daily. 90 tablet 3    ferrous sulfate 325 (65 Fe) MG TBEC EC tablet Take 325 mg by mouth every morning  (Patient not taking: Reported on 12/16/2024)      furosemide (LASIX) 20 MG tablet TAKE 1 TABLET TWICE DAILY IN THE MORNING AND AFTER LUNCH 180  tablet 3    irbesartan (AVAPRO) 300 MG tablet TAKE 1 TABLET EVERY DAY 90 tablet 3    lovastatin (MEVACOR) 40 MG tablet TAKE 1 TABLET AT BEDTIME (SCHEDULE ANNUAL VISIT, NEED FASTING LABS) 90 tablet 3    methimazole (TAPAZOLE) 5 MG tablet TAKE 1 TABLET ALTERNATING WITH 1/2 TABLET EVERY OTHER DAY 66 tablet 3    Omega-3 Fatty Acids (FISH OIL) 500 MG CAPS Take 1 capsule by mouth every morning       omeprazole (PRILOSEC) 20 MG DR capsule TAKE 1 CAPSULE EVERY DAY 90 capsule 3    propranolol ER (INDERAL LA) 60 MG 24 hr capsule TAKE 1 CAPSULE EVERY DAY 90 capsule 1    RESTASIS 0.05 % ophthalmic emulsion       rOPINIRole (REQUIP) 0.25 MG tablet TAKE 1 TABLET IN THE AFTERNOON AND 2 TABLETS AT NIGHT 270 tablet 0    sertraline (ZOLOFT) 100 MG tablet TAKE 1 TABLET EVERY MORNING 90 tablet 0    traZODone (DESYREL) 50 MG tablet TAKE 1/2 TABLET AT BEDTIME 45 tablet 0    XARELTO ANTICOAGULANT 15 MG TABS tablet TAKE 1 TABLET (15 MG) BY MOUTH DAILY (WITH DINNER) 90 tablet 3       Allergies - reviewed   No Known Allergies    Past history -reviewed  Past Medical History:   Diagnosis Date    CRESENCIO (acute kidney injury) 9/11/2018    Arthritis     Asthma 2/9/2022    Calculus of kidney     Chemotherapy-induced neutropenia 3/6/2019    Congestive heart failure (H)     Esophageal reflux     GERD (gastroesophageal reflux disease)     Hyperlipidemia LDL goal <130 5/9/2010    Malignant melanoma of skin of trunk, except scrotum (H)     Nonspecific abnormal finding     has living will 2004 -     Nontoxic multinodular goiter     no further eval /tx rec per pt    Osteopenia     Other psoriasis     Personal history of colonic polyps     PMR (polymyalgia rheumatica)     Restless legs syndrome 10/12/2005    Stress-induced cardiomyopathy     Undiagnosed cardiac murmurs     Unspecified constipation     Unspecified essential hypertension     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      HEENT: Sclerae white, not injected.    Neck: No JVD. No thyromegaly  Heart: irregular. 2/6 systolic murmur RSB, no rub,or gallop.    Lungs: Clear bilaterally.  No rhonchi, wheezes, rales.   Extremities: swollen legs (non pitting).  The pulses are 2+at the radial bilaterally.      I have independently reviewed this patient's relevant laboratory and cardiac data :    Recent Labs   Lab Test 11/08/23  1431 12/09/22  1000   CHOL 150 153   HDL 46* 47*   LDL 76 80   TRIG 140 130      Recent Labs   Lab Test 09/13/24  1150 11/08/23  1431   AST 41 22     Recent Labs   Lab Test 09/13/24  1150 11/14/23  0022   ALT 31 21     Recent Labs   Lab Test 09/13/24  1150 12/18/23  1548    139   POTASSIUM 5.0 4.6   CHLORIDE 99 99   CO2 23 29   ANIONGAP 13 11   BUN 20.2 19.7   CR 1.01*  1.01* 1.04*    < > = values in this interval not displayed.     Recent Labs   Lab Test 03/15/24  1232 12/18/23  1548   WBC 10.0 7.6   RBC 4.36 4.08   HGB 12.9 12.7   MCV 93 98    214     Recent Labs   Lab Test 11/08/23  1431 12/14/17  1152   A1C 6.1* 6.2*     Recent Labs   Lab Test 12/13/24  1038 05/28/24  1419   TSH 3.12 3.17     ECG 9/17/24   Atrial fibrillation at 74     Echo: 12/07/2023   Global and regional left ventricular function is normal with an EF of 55-60%.  Global right ventricular function is normal.  Mild aortic insufficiency.  Moderate tricuspid insufficiency.  Elevated pulmonary artery systolic pressure, 48 mmHg.  Dilation of the inferior vena cava.  No change in ventricular function; AI and TR are worse and estimated PA pressure is higher.    Coronary Angiogram/C: 12/13/17      Assessment and  Plan:  89 year old patient with    Essential hypertension  Dyslipidemia  Normal biventricular function   Permanent atrial fibrillation  HFpEF    Dimple Oviedo has heart failure with preserved EF.  I would place her at NYHA functional Class 3, AHA/ACC Stage C. Patient is  overall euvolemic on exam today with a normal blood pressure. Patient is in rate controlled atrial fibrillation today. Patient has normal biventricular function with moderate TR and pulmonary hypertension.     Will continue SGLT2i (Class I/2a) for HFpEF, switch to Farxiga from Jardiance. Son who has accompanied her to the visit, feels that she is stable and doing well, so will defer MRA She was advised to contact us in the event she developed signs of HF.     Patient inquiring if she should continue iron supplements. Will check ferritin level along with a lipid panel (which she is due for since she is on statin).    Recommendations:   Continue current medications.  Start Farxiga 10 mg daily  Stop Jardiance 10 mg   Follow up in 12 months      Anahi Sagastume MD, MS  Professor of Medicine  Cardiovascular Medicine

## 2025-04-23 ENCOUNTER — LAB (OUTPATIENT)
Dept: LAB | Facility: CLINIC | Age: OVER 89
End: 2025-04-23
Attending: INTERNAL MEDICINE
Payer: MEDICARE

## 2025-04-23 DIAGNOSIS — E05.00 GRAVES DISEASE: ICD-10-CM

## 2025-04-23 DIAGNOSIS — R91.8 PULMONARY NODULES: ICD-10-CM

## 2025-04-23 DIAGNOSIS — C66.1 UROTHELIAL CARCINOMA OF RIGHT DISTAL URETER (H): ICD-10-CM

## 2025-04-23 DIAGNOSIS — M79.89 SWELLING OF LOWER EXTREMITY: ICD-10-CM

## 2025-04-23 LAB
ALBUMIN SERPL BCG-MCNC: 3.8 G/DL (ref 3.5–5.2)
ALP SERPL-CCNC: 93 U/L (ref 40–150)
ALT SERPL W P-5'-P-CCNC: 18 U/L (ref 0–50)
ANION GAP SERPL CALCULATED.3IONS-SCNC: 9 MMOL/L (ref 7–15)
AST SERPL W P-5'-P-CCNC: 26 U/L (ref 0–45)
BASOPHILS # BLD AUTO: 0.1 10E3/UL (ref 0–0.2)
BASOPHILS NFR BLD AUTO: 1 %
BILIRUB SERPL-MCNC: 0.5 MG/DL
BUN SERPL-MCNC: 22 MG/DL (ref 8–23)
CALCIUM SERPL-MCNC: 9.2 MG/DL (ref 8.8–10.4)
CHLORIDE SERPL-SCNC: 98 MMOL/L (ref 98–107)
CHOLEST SERPL-MCNC: 136 MG/DL
CREAT SERPL-MCNC: 1.04 MG/DL (ref 0.51–0.95)
EGFRCR SERPLBLD CKD-EPI 2021: 50 ML/MIN/1.73M2
EOSINOPHIL # BLD AUTO: 0.1 10E3/UL (ref 0–0.7)
EOSINOPHIL NFR BLD AUTO: 1 %
ERYTHROCYTE [DISTWIDTH] IN BLOOD BY AUTOMATED COUNT: 16.7 % (ref 10–15)
FASTING STATUS PATIENT QL REPORTED: NO
FERRITIN SERPL-MCNC: 87 NG/ML (ref 11–328)
GLUCOSE SERPL-MCNC: 98 MG/DL (ref 70–99)
HCO3 SERPL-SCNC: 27 MMOL/L (ref 22–29)
HCT VFR BLD AUTO: 36.6 % (ref 35–47)
HDLC SERPL-MCNC: 46 MG/DL
HGB BLD-MCNC: 11.3 G/DL (ref 11.7–15.7)
IMM GRANULOCYTES # BLD: 0 10E3/UL
IMM GRANULOCYTES NFR BLD: 0 %
LDLC SERPL CALC-MCNC: 62 MG/DL
LYMPHOCYTES # BLD AUTO: 1.2 10E3/UL (ref 0.8–5.3)
LYMPHOCYTES NFR BLD AUTO: 15 %
MCH RBC QN AUTO: 25.7 PG (ref 26.5–33)
MCHC RBC AUTO-ENTMCNC: 30.9 G/DL (ref 31.5–36.5)
MCV RBC AUTO: 83 FL (ref 78–100)
MONOCYTES # BLD AUTO: 0.8 10E3/UL (ref 0–1.3)
MONOCYTES NFR BLD AUTO: 10 %
NEUTROPHILS # BLD AUTO: 5.8 10E3/UL (ref 1.6–8.3)
NEUTROPHILS NFR BLD AUTO: 73 %
NONHDLC SERPL-MCNC: 90 MG/DL
NRBC # BLD AUTO: 0 10E3/UL
NRBC BLD AUTO-RTO: 0 /100
PLATELET # BLD AUTO: 212 10E3/UL (ref 150–450)
POTASSIUM SERPL-SCNC: 4.2 MMOL/L (ref 3.4–5.3)
PROT SERPL-MCNC: 6.8 G/DL (ref 6.4–8.3)
RBC # BLD AUTO: 4.39 10E6/UL (ref 3.8–5.2)
SODIUM SERPL-SCNC: 134 MMOL/L (ref 135–145)
TRIGL SERPL-MCNC: 139 MG/DL
WBC # BLD AUTO: 8 10E3/UL (ref 4–11)

## 2025-04-23 PROCEDURE — 85025 COMPLETE CBC W/AUTO DIFF WBC: CPT | Performed by: PATHOLOGY

## 2025-04-23 PROCEDURE — 82728 ASSAY OF FERRITIN: CPT | Mod: GA | Performed by: PATHOLOGY

## 2025-04-23 PROCEDURE — 80061 LIPID PANEL: CPT | Performed by: PATHOLOGY

## 2025-04-23 PROCEDURE — 36415 COLL VENOUS BLD VENIPUNCTURE: CPT | Performed by: PATHOLOGY

## 2025-04-23 RX ORDER — FUROSEMIDE 20 MG/1
TABLET ORAL
Qty: 180 TABLET | Refills: 3 | Status: SHIPPED | OUTPATIENT
Start: 2025-04-23

## 2025-04-29 ENCOUNTER — ONCOLOGY VISIT (OUTPATIENT)
Dept: ONCOLOGY | Facility: CLINIC | Age: OVER 89
End: 2025-04-29
Attending: INTERNAL MEDICINE
Payer: MEDICARE

## 2025-04-29 VITALS
OXYGEN SATURATION: 95 % | WEIGHT: 174.1 LBS | DIASTOLIC BLOOD PRESSURE: 66 MMHG | BODY MASS INDEX: 36.39 KG/M2 | HEART RATE: 85 BPM | SYSTOLIC BLOOD PRESSURE: 145 MMHG | TEMPERATURE: 98.5 F | RESPIRATION RATE: 32 BRPM

## 2025-04-29 DIAGNOSIS — C66.1 UROTHELIAL CARCINOMA OF RIGHT DISTAL URETER (H): Primary | ICD-10-CM

## 2025-04-29 DIAGNOSIS — D64.9 ANEMIA, UNSPECIFIED TYPE: ICD-10-CM

## 2025-04-29 DIAGNOSIS — C66.1 UROTHELIAL CARCINOMA OF RIGHT DISTAL URETER (H): ICD-10-CM

## 2025-04-29 DIAGNOSIS — E05.00 GRAVES DISEASE: ICD-10-CM

## 2025-04-29 DIAGNOSIS — R91.8 PULMONARY NODULES: ICD-10-CM

## 2025-04-29 PROCEDURE — 99214 OFFICE O/P EST MOD 30 MIN: CPT | Performed by: INTERNAL MEDICINE

## 2025-04-29 PROCEDURE — G2211 COMPLEX E/M VISIT ADD ON: HCPCS | Performed by: INTERNAL MEDICINE

## 2025-04-29 PROCEDURE — G0463 HOSPITAL OUTPT CLINIC VISIT: HCPCS | Performed by: INTERNAL MEDICINE

## 2025-04-29 ASSESSMENT — PAIN SCALES - GENERAL: PAINLEVEL_OUTOF10: NO PAIN (0)

## 2025-04-29 NOTE — NURSING NOTE
"Oncology Rooming Note    April 29, 2025 10:31 AM   Dimple Oviedo is a 89 year old female who presents for:    Chief Complaint   Patient presents with    Oncology Clinic Visit     Urothelial cancer     Initial Vitals: BP (!) 145/66 (BP Location: Right arm, Patient Position: Sitting, Cuff Size: Adult Regular)   Pulse 85   Temp 98.5  F (36.9  C) (Oral)   Resp (!) 32   Wt 79 kg (174 lb 1.6 oz)   SpO2 95%   BMI 36.39 kg/m   Estimated body mass index is 36.39 kg/m  as calculated from the following:    Height as of 7/2/24: 1.473 m (4' 10\").    Weight as of this encounter: 79 kg (174 lb 1.6 oz). Body surface area is 1.8 meters squared.  No Pain (0) Comment: Data Unavailable   No LMP recorded. Patient is postmenopausal.  Allergies reviewed: Yes  Medications reviewed: Yes    Medications: Medication refills not needed today.  Pharmacy name entered into [facility]:    [facility] MAIL DELIVERY - [facility] [address] [facility] [place] [address] [facility]ARMAC[facility] [place], [place] - [address] [place] [address] [phone]  [id number] - [place], [place] - [address] AT [facility] [address]    Frailty Screening:   Is the patient here for a new oncology consult visit in cancer care? 2. No    PHQ9:  Did this patient require a PHQ9?: No      Clinical concerns: None      Checo Castro"

## 2025-04-29 NOTE — PROGRESS NOTES
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    HISTORY OF PRESENT ILLNESS:  Ms. 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 Her oncologic history is as under.    Ms. Oviedo is doing well. She denies any complains suggestive of recurrent disease. She had covid at the beginning of the month with a cough. She is feeling better now. She has no new pains in her body. No hematuria. No fevers or chills. No chest pain or shortness of breath.     She is accompanied by her son who has PhD in agriculture and is a doctor but not a medical doctor.      ONCOLOGIC HISTORY:  1. 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   - Jan 2018 to September 2018- Persistent gross hematuria and underwent multiple procedures.    - 2/19/18 and 4/3/18- cystoscopies with bladder biopsies  which were negative for bladder tumor  - 1/17/18, 3/8/18, 7/26/18, 9/10/18- Multiple urine cytologies have come back positive by FISH for high-grade transitional cell carcinoma  - 9/10/18- Underwent a bilateral cystoureteroscopy with biopsy and brushing that showed  right upper tract cytology suspicious for high-grade urothelial ca. Right ureter brushing negative from that day. Left upper tract negative.  - 9/10/18- CT A/P without contrast showed new small bilateral pleural effusions and interstitial pulmonary edema but no evidence of locoregional or metastatic disease.  - 9/12/18- Presented to ED with oliguria, CRESENCIO, and mild hydronephrosis bilaterally on CT scan and underwent bilateral ureteral stent placment  - 11/13/2018- Right robotic nephroureterectomy, excision  "of sandip-caval mass and LN excision by Stuart King. Pathology showed \"Right nephroureterectomy: Invasive high grade urothelial carcinoma measuring 3.5 cm, located in renal pelvis and invading through renal parenchyma into perinephric fat. Urothelial carcinoma in-situ present. Margins free of tumor. Sandip-caval mass: Metastatic urothelial carcinoma involving one lymph node with at least 1 cm tumor deposit. Pericaval Extranodal Extension present. Five additional benign pericaval lymph nodes. Pathologic stage: pT4N1 (1 of 6 lymph nodes).\"  - 12/11/18- PET/CT whole body demonstrated significant residual FDG avid disease. For example, \"there is an FDG avid ill-defined pericaval mass immediately medial to the surgical clips measuring approximately 2.0 x 1.4 cm, with max SUV measuring up to12.2, see series 4 image 21. Additional FDG avid retrocaval and interaortocaval lymphadenopathy are noted.There is a 1.2 cm nodule in the right hemipelvis posterior lateral tothe bladder with max SUV measuring 8.3, see series 4 image 77. The bladder is incompletely distended.\" No suspicious thoracic or bone uptake.  - 12/12/18- Started adjuvant chemotherapy (carbo+gem) per POUT trial. Cycle 2 - 1/2/19. Cycle 3 - 1/23/19. Cycle 4 - 02/13/19.  - 1/22/19 - CT C/A/P with contrast compared with prior PET/CT: \"1. New well-circumscribed soft tissue pulmonary nodules of the right lower lobe and left upper lobe. Findings could be infectious, inflammatory, or represent metastatic disease. Continued attention on follow-up recommended. Postoperative changes of right nephrectomy. No evidence for local recurrence in the present study.\"  - 3/1/2019- CT C/A/P with contrast - \"1. Bilateral pulmonary nodules measuring up to 4 mm in the right lower lobe, previously measuring 8 mm. No new or enlarging pulmonary nodules. 2. No convincing evidence for metastatic disease in the abdomen, pelvis and bones.\"  - 6/3/2019- CT C/A/P with contrast - \"1. Surgical " "changes of right nephrectomy without evidence of residual or recurrent disease on this noncontrast exam.  Consider contrast enhanced examination on follow-up. 2. No evidence of metastatic disease in the abdomen, or pelvis on noncontrast CT.  Scattered tiny pulmonary nodules in the chest are not significantly changed.  Previously described prominent right lower lobe pulmonary nodule (previously measuring up to 8 mm) is no longer visualized. 3. Stable bilateral pulmonary nodules measuring maximally 4 mm.\"  - 09/11/19: CT C/A/P without contrast - \"1. Stable exam without evidence convincing for new disease. 2. Stable pulmonary nodules. 3. 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 4. Stable heterogeneous enlargement of the thyroid with underlying nodules suggested.\"  - 12/4/19: CT C/A/P without contrast - \"No acute change since prior exam. No evidence of recurrent or metastatic disease. Tiny lung nodules are stable. Prior right nephrectomy. Left renal artery aneurysm is stable.\"  - 04/08/20, 7/27/20: CT C/A/P with contrast - GABRIELLE.  - 01/12/21: CT C/A/P with contrast - \"1.  Slight increased size of a 6 mm left upper lobe nodule and new 4 mm linear opacity along the right major fissure. These are indeterminate but could represent progression of metastatic disease. 2.  Slight increased size of mildly enlarged mediastinal and hilar lymph nodes. 3.  Mild pulmonary edema. 4.  No recurrent or metastatic disease in the abdomen and pelvis. 5.  Mild stranding around the urinary bladder dome may be related to cystitis. Punctate focus of gas within the urinary bladder either secondary to infection or instrumentation. 6.  Enlarged multinodular thyroid gland.\"    2. Non-muscle invasive bladder cancer:  - 10/3/19: Cystoscopy showed a small area of erythema on retroflexion, possibly pre-existing or due to retroflexion of the scope. Urine cytology from that time showed cells suspicious for malignancy.   - 1/13/20: Bladder " biopsy showed high grade urothelial carcinoma in-situ  - 2/12/20-3/18/20: Intravesical BCG therapy  - 7/24/20 and last cystoscopy was on the same day. It was negative. However, urine cytology was positive for high-grade urothelial carcinoma.   - 11/25/20-12/10/2020: 3 weekly doses of intravesical BCG 50mg.   - 12/10/20: Cytology suspicious and FISH u[facility]ysion positive for TCC.    REVIEW OF SYSTEMS: 14 point ROS negative other than the symptoms noted above in the HPI.    PAST MEDICAL AND SURGICAL HISTORY:   Past Medical History:   Diagnosis Date    CRESENCIO (acute kidney injury) 9/11/2018    Arthritis     Asthma 2/9/2022    Calculus of kidney     Chemotherapy-induced neutropenia 3/6/2019    Congestive heart failure (H)     Esophageal reflux     GERD (gastroesophageal reflux disease)     Hyperlipidemia LDL goal <130 5/9/2010    Malignant melanoma of skin of trunk, except scrotum (H)     Nonspecific abnormal finding     has living will 2004 -     Nontoxic multinodular goiter     no further eval /tx rec per pt    Osteopenia     Other psoriasis     Personal history of colonic polyps     PMR (polymyalgia rheumatica)     Restless legs syndrome 10/12/2005    Stress-induced cardiomyopathy     Undiagnosed cardiac murmurs     Unspecified constipation     Unspecified essential hypertension     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     Past Surgical History:   Procedure Laterality Date    ARTHROPLASTY KNEE Right 11/9/2020    Procedure: ARTHROPLASTY, KNEE, TOTAL, RIGHT;  Surgeon: Edward Otero MD;  [facility]    BIOPSY      COLONOSCOPY  2014    COMBINED CYSTOSCOPY, INSERT STENT URETER(S) Bilateral 9/12/2018    Procedure: COMBINED CYSTOSCOPY, INSERT STENT URETER(S);  Cystoscopy Bilateral ureteral Stent Placement.;  Surgeon: Justin Henry MD;  [facility]    COMBINED CYSTOSCOPY, RETROGRADES, URETEROSCOPY, INSERT STENT Bilateral 4/3/2018    Procedure: COMBINED CYSTOSCOPY, RETROGRADES, URETEROSCOPY, INSERT STENT;;   Surgeon: Stuart King MD;  [facility]    COMBINED CYSTOSCOPY, RETROGRADES, URETEROSCOPY, INSERT STENT Bilateral 9/10/2018    Procedure: COMBINED CYSTOSCOPY, RETROGRADES, URETEROSCOPY, INSERT STENT;  Cystoscopy, Bilateral Ureteroscopy, Bladder Biopsies, Retrogram Pyelograms, Ureteral Washings and brushings, cysview;  Surgeon: Stuart King MD;  [facility]    COMBINED CYSTOSCOPY, RETROGRADES, URETEROSCOPY, INSERT STENT Left 8/26/2020    Procedure: Cystoscopy, Left Ureteral Wash, Left ureteral Retrograde Pyelogram;  Surgeon: Justin Henry MD;  [facility]    CYSTOSCOPY, BIOPSY BLADDER INSTILL OPTICAL AGENT N/A 4/3/2018    Procedure: CYSTOSCOPY, BIOPSY BLADDER INSTILL OPTICAL AGENT;  Cystoscopy, Blue Light Cystoscopy, Bladder Biopsies, Bilateral Selective ureteral washings for Cytology, Bilateral Retrograde Pyelograms, Bilateral Ureteroscopy;  Surgeon: Stuart King MD;  [facility]    CYSTOSCOPY, BIOPSY BLADDER, COMBINED N/A 2/19/2018    Procedure: COMBINED CYSTOSCOPY, BIOPSY BLADDER;  Cystoscopy, Bladder Biopsy;  Surgeon: Kenna La MD;  [facility]    CYSTOSCOPY, BIOPSY BLADDER, COMBINED N/A 8/26/2020    Procedure: Cystoscopy, biopsy bladder, combined;  Surgeon: Justin Henry MD;  [facility]    CYSTOSCOPY, FULGURATE BLADDER TUMOR, COMBINED N/A 1/13/2020    Procedure: CYSTOSCOPY, WITH  bladder biopsies and fulguration;  Surgeon: Stuart King MD;  [facility]    CYSTOSCOPY, REMOVE STENT(S), COMBINED  11/13/2018    Procedure: Flexible Cystoscopy with Stent Removal;  Surgeon: Stuart King MD;  [facility]    DAVINCI LYMPHADENECTOMY N/A 11/13/2018    Procedure: Davinci Lymphadenectomy ;  Surgeon: Stuart King MD;  [facility]    DAVINCI NEPHROURETERECTOMY N/A 11/13/2018    Procedure: Right DaVinci Assisted Nephroureterectomy;  Surgeon: Stuart King MD;  [facility]     ENDOSCOPIC ULTRASOUND LOWER GASTROINTESTIONAL TRACT (GI) N/A 10/30/2015    Procedure: ENDOSCOPIC ULTRASOUND LOWER GASTROINTESTIONAL TRACT (GI);  Surgeon: Daniel Jean-Baptiste MD;  [facility]    ESOPHAGOSCOPY, GASTROSCOPY, DUODENOSCOPY (EGD), COMBINED N/A 11/29/2023    Procedure: Esophagoscopy, gastroscopy, duodenoscopy (EGD), combined;  Surgeon: Justin Peñaloza MD;  [facility]    EYE SURGERY  12/4/17    INSERT PORT VASCULAR ACCESS Right 12/19/2018    Procedure: Chest Port Placement - right;  Surgeon: Stuart Chavez PA-C;  [facility]    IR CHEST PORT PLACEMENT > 5 YRS OF AGE  12/19/2018    IR PORT REMOVAL RIGHT  6/26/2019    LAPAROSCOPIC CHOLECYSTECTOMY WITH CHOLANGIOGRAMS N/A 11/1/2015    Procedure: LAPAROSCOPIC CHOLECYSTECTOMY WITH CHOLANGIOGRAMS;  Surgeon: Tonie Warren MD;  [facility]    REMOVE PORT VASCULAR ACCESS Right 6/26/2019    Procedure: Right Port Removal;  Surgeon: Froilan Irizarry PA-C;  [facility]    SURGICAL HISTORY OF -   1996    malignant melanoma    SURGICAL HISTORY OF -   1968    thyroid nodule    SURGICAL HISTORY OF -       D & C    ZZC NONSPECIFIC PROCEDURE  2005    colonoscopy polyp repeat 2010     SOCIAL HISTORY:   Social History     Tobacco Use    Smoking status: Never     Passive exposure: Never    Smokeless tobacco: Never   Vaping Use    Vaping status: Never Used   Substance Use Topics    Alcohol use: No     Alcohol/week: 0.0 standard drinks of alcohol     Comment: rare    Drug use: No     FAMILY HISTORY:   Family History   Problem Relation Age of Onset    Cancer Father         dec - esophageal and laryngeal    Heart Disease Mother     Respiratory Mother         dec    Breast Cancer Daughter     Other Cancer Daughter     Thyroid Disease Daughter     Asthma Daughter     Hyperlipidemia Son     Diabetes Son     Anesthesia Reaction No family hx of     Deep Vein Thrombosis (DVT) No family hx of      ALLERGIES:   No Known Allergies  CURRENT  MEDICATIONS:   Current Outpatient Medications:     alendronate (FOSAMAX) 70 MG tablet, TAKE 1 TABLET EVERY 7 DAYS AT LEAST 60 MINUTES BEFORE BREAKFAST AS DIRECTED., Disp: 12 tablet, Rfl: 3    dapagliflozin (FARXIGA) 10 MG TABS tablet, Take 1 tablet (10 mg) by mouth daily., Disp: 90 tablet, Rfl: 3    diltiazem ER (DILT-XR) 180 MG 24 hr capsule, TAKE 1 CAPSULE EVERY DAY, Disp: 90 capsule, Rfl: 3    ferrous sulfate 325 (65 Fe) MG TBEC EC tablet, Take 325 mg by mouth every morning  (Patient not taking: Reported on 12/16/2024), Disp: , Rfl:     furosemide (LASIX) 20 MG tablet, TAKE 1 TABLET TWICE DAILY IN THE MORNING AND AFTER LUNCH, Disp: 180 tablet, Rfl: 3    irbesartan (AVAPRO) 300 MG tablet, TAKE 1 TABLET EVERY DAY, Disp: 90 tablet, Rfl: 3    lovastatin (MEVACOR) 40 MG tablet, TAKE 1 TABLET AT BEDTIME (SCHEDULE ANNUAL VISIT, NEED FASTING LABS), Disp: 90 tablet, Rfl: 3    methimazole (TAPAZOLE) 5 MG tablet, TAKE 1 TABLET ALTERNATING WITH 1/2 TABLET EVERY OTHER DAY, Disp: 66 tablet, Rfl: 3    Omega-3 Fatty Acids (FISH OIL) 500 MG CAPS, Take 1 capsule by mouth every morning , Disp: , Rfl:     omeprazole (PRILOSEC) 20 MG DR capsule, TAKE 1 CAPSULE EVERY DAY, Disp: 90 capsule, Rfl: 3    propranolol ER (INDERAL LA) 60 MG 24 hr capsule, TAKE 1 CAPSULE EVERY DAY, Disp: 90 capsule, Rfl: 1    RESTASIS 0.05 % ophthalmic emulsion, , Disp: , Rfl:     rOPINIRole (REQUIP) 0.25 MG tablet, TAKE 1 TABLET IN THE AFTERNOON AND 2 TABLETS AT NIGHT, Disp: 270 tablet, Rfl: 0    sertraline (ZOLOFT) 100 MG tablet, TAKE 1 TABLET EVERY MORNING, Disp: 90 tablet, Rfl: 0    traZODone (DESYREL) 50 MG tablet, TAKE 1/2 TABLET AT BEDTIME, Disp: 45 tablet, Rfl: 0    XARELTO ANTICOAGULANT 15 MG TABS tablet, TAKE 1 TABLET (15 MG) BY MOUTH DAILY (WITH DINNER), Disp: 90 tablet, Rfl: 3    Current Facility-Administered Medications:     lidocaine (XYLOCAINE) 2 % external gel, , Urethral, Once, Justin Henry MD    PHYSICAL EXAMINATION:  General: No  acute distress  HEENT: Sclera anicteric. Oral mucosa pink and moist.  No mucositis or thrush  Lymph: No lymphadenopathy in neck  Heart: Regular, rate, and rhythm  Lungs: Clear to ascultation bilaterally  Abdomen: Positive bowel sounds. Soft, non-distended, non-tender. No organomegaly or mass.   Extremities: no lower extremity edema  Neuro: Cranial nerves grossly intact  Rash: none  Vascular access: port    LABORATORY DATA:   Recent Labs   Lab Test 04/23/25  1003 04/23/25  0932 09/13/24  1150 09/13/24  1121 03/15/24  1143 12/18/23  1548 12/07/23  1613 11/14/23  0022   *  --  135  --   --  139 138 142   POTASSIUM 4.2  --  5.0  --   --  4.6 4.7 4.1   CHLORIDE 98  --  99  --   --  99 100 103   CO2 27  --  23  --   --  29 29 26   ANIONGAP 9  --  13  --   --  11 9 13   BUN 22.0  --  20.2  --   --  19.7 16.8 13.9   CR 1.04* 1.2* 1.01*  1.01* 1.1* 1.1* 1.04* 0.93 0.87   GLC 98  --  81  --   --  101* 103* 104*   SHREYA 9.2  --  9.2  --   --  9.4 9.4 9.1     Recent Labs   Lab Test 09/02/21  0644 09/01/21  1505 02/03/19  2102 12/12/18  1050 01/16/18  1247 01/16/18  0646 12/13/17  2236   MAG 2.4* 2.3 1.8 2.1 2.1   < > 2.0   PHOS  --   --   --   --   --   --  2.4*    < > = values in this interval not displayed.     Recent Labs   Lab Test 04/23/25  1003 03/15/24  1232 12/18/23  1548 12/07/23  1613 11/14/23  0022 11/13/23  1952 07/18/23  1158 02/27/23  1306   WBC 8.0 10.0 7.6 8.4 8.0 7.5 6.4 8.4   HGB 11.3* 12.9 12.7 12.8 12.3 13.0 12.9 12.8    263 214 220 191 221 209 192   MCV 83 93 98 96 98 98 97 97   NEUTROPHIL 73  --   --   --  68 70 71 73    < > = values in this interval not displayed.     Recent Labs   Lab Test 04/23/25  1003 09/13/24  1150 11/14/23  0022 11/13/23  1952 11/08/23  1431 12/09/21  2356 11/19/21  0911 08/11/21  1031 09/11/18  0612 07/17/18  1346   BILITOTAL 0.5 0.6 0.5   < > 0.5   < > 0.5 0.4   < >  --    ALKPHOS 93 98 86  --  89   < > 98 88   < >  --    ALT 18 31 21  --  17   < > 23 17   < >  --   "  AST 26 41  --   --  22   < > 15 14   < >  --    ALBUMIN 3.8 4.1 4.1   < > 4.2   < > 3.4 3.5   < >  --    LDH  --   --   --   --   --   --  180 176  --  204    < > = values in this interval not displayed.     TSH   Date Value Ref Range Status   12/13/2024 3.12 0.30 - 4.20 uIU/mL Final   05/28/2024 3.17 0.30 - 4.20 uIU/mL Final   06/22/2023 2.39 0.30 - 4.20 uIU/mL Final   05/20/2022 2.90 0.40 - 4.00 mU/L Final   09/02/2021 1.24 0.40 - 4.00 mU/L Final   07/05/2021 1.68 0.40 - 4.00 mU/L Final   05/27/2021 1.93 0.40 - 4.00 mU/L Final   01/27/2021 2.42 0.40 - 4.00 mU/L Final     No results for input(s): \"CEA\" in the last [id number] hours.  Results for orders placed or performed in visit on 04/23/25   CT Chest/Abdomen/Pelvis w Contrast    Narrative    EXAMINATION: CT CHEST/ABDOMEN/PELVIS W CONTRAST, 4/23/2025 9:45 AM    INDICATION: On surveillance after adjuvant chemotherapy for stage IV  ([id number]) urothelial Ca of rt renal pelvis-(nephroureterectomy   pericaval node Sx 11/13/18); Urothelial carcinoma of right distal  ureter (H); Pulmonary nodules; Graves disease    COMPARISON STUDY: CT chest abdomen and pelvis 9/13/2024    TECHNIQUE: CT scan of the chest, abdomen and pelvis was performed on  multidetector CT scanner using volumetric acquisition technique and  images were reconstructed in multiple planes with variable thickness  and reviewed on dedicated workstations.     CONTRAST: Isovue 370 88cc.   Without oral contrast.    CT scan radiation dose is optimized to minimum requisite dose using  automated dose modulation techniques.    FINDINGS:    Chest:   Mediastinum: Stable enlarged multinodular thyroid gland. Heart size is  stable, mildly enlarged. No pericardial effusion. Calcifications of  the aortic valve. Normal caliber main pulmonary artery and ascending  aorta. Mild calcifications of the aortic arch. Normal branching  pattern of the great vessels, the common carotid artery is posteriorly  displaced by the enlarged " right thyroid gland. Right paratracheal  lymph node measuring up to 11 mm in short axis (series 2 image 18,  similar to prior. Small hiatal hernia.    Pleura/lungs: No significant pleural effusion. No pneumothorax. Left  posteromedial enhancing pleural thickening measuring 2.2 x 0.6 cm  (3/193) previously measured 2.1 x 7.4 cm. More superior enhancing  peripheral nodule in the left lower thorax measuring 2.2 x 1.3 cm  (3/166) previously measured 1.2 x 0.6 cm. Adjacent medial pleural  nodule measuring 1.1 x 0.6 cm (3/159) is stable. Stable calcified  nodule in the posterior right lower lobe base.    Abdomen and Pelvis:  Liver: No mass. No intrahepatic biliary ductal dilation.  Hypodense  appearance relative to the spleen likely to represent hepatic  steatosis.    Biliary System: Gallbladder surgically absent.    Pancreas: No mass or pancreatic ductal dilation.    Adrenal glands: No mass or nodules    Spleen: Normal.    Kidneys: Right nephrectomy. No suspicious lesion in the surgical bed.  Unremarkable left kidney. 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 No hydronephrosis.    Gastrointestinal tract: Normal appendix. Normal caliber small bowel.   No significant wall abnormality.    Mesentery/peritoneum/retroperitoneum: No mass. No free fluid or air.    Lymph nodes: No significant lymphadenopathy.    Vasculature: Patent major abdominal vasculature.  Atherosclerotic  calcifications of the abdominal aorta and branching vessels.    Pelvis: Urinary bladder is normal.    Osseous structures: No aggressive or acute osseous lesion.  Multilevel  degenerative changes of the visualized spine. With grade 1  anterolisthesis of L4 over L5, grade 1 retrolisthesis of L1 over L2.  Significant disc height loss at L5-S1. Along the left femoral  head/neck junction there is an anterior exostosis.      Soft tissues: Diastasis recti. Diffuse moderate muscle atrophy.      Impression     IMPRESSION:   1. Increased size of pleural-based lesions along the posterior and  medial left hemithorax. Findings are concerning for metastatic  disease.   2. Right nephrectomy without evidence of recurrent local disease.  3. Stable multinodular thyroid.  4. 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    I have personally reviewed the examination and initial interpretation  and I agree with the findings.    JUANITA PATRICIA MD         SYSTEM ID:  [id number]     *Note: Due to a large number of results and/or encounters for the requested time period, some results have not been displayed. A complete set of results can be found in Results Review.                I reviewed the above labs and imaging today.      ASSESSMENT AND PLAN: Ms. Oviedo is a delightful 89 year old lady with localized stage IV ([id number]) high-grade, muscle-invasive transitional cell carcinoma of right renal pelvis, status post right robotic nephroureterectomy and 4 cycles of adjuvant carbo/gem; as well as NMIBC.      Dimple continues to remain in good health. She has no new reported symptoms in history. We spent time reviewing her CT CAP from 3/15/24. She has a stable pleural based lung change and a stable thyroid nodule when compared to 09/13/23 scan. There previously was a small FDG uptake in the hepatic flexure of the colon. There are no bowel lesions depicted in her scan from 3/15/24.   - Please see my note from 09/2023 regarding biopsying the lesions. Overall, decision was to continue with surveillance with CT CAP every 6 months. If she has any symptoms from her lesion we could consider radiation even without a biopsy.    1. Upper tract urothelial cancer:   - She completed 4 cycles of adjuvant carboplatin plus gemcitabine chemotherapy per POUT trial.    She completed adjuvant chemotherapy in Feb 2019.  - No residual side effects or evidence of recurrence.  - Reviewed restaging CT scan9/13/24 as above; there is no clear evidence of disease recurrence  in chest/abd/pelvis.   She continues to have multiple pleural based pulmonary nodules which remain stable except one of the pleural nodule in left lower lobe has doubled in it's size based on measurements - volume increase could be a lot higher.   I have pasted representative image above. The only way to establish a diagnosis would be by doing a needle biopsy. Tereso who has come with her son and had her daughter in [place] on the phone for conversation. She agreed for the biopsy. I will enter a IR referral.        2. High grade urothelial carcinoma in situ:  - Bx proven carcinoma in situ in 1/2020, completed the first round of intravesical BCG treatment 2/2020-3/2020 and second in 11/2020-12/2020.  - She was initially followed by Dr. King and now is under care of Dr. Henry.   - She did have urine cytology and is negative; FISH by [facility] is pending    4. HERMAN:  - She follows Dr. Griffith in Cardiology for her h/o moderate AORTIC STENOSIS, CHF, and Afib with SOA and lymphedema with no cancer-related etiology evident.   - She has new anemia. Her MCV has dropped from 93 to 83 Fl. I am worried this could be iron deficiency. Her PCP did check ferritin which was adequate. I will get additional iron, TIBC, percent saturation, soluble transferrin receptor, B12 and folate checked.     Return to clinic after biopsy of pleural based lung nodule to see me.     The longitudinal plan of care for the diagnosis(es)/condition(s) as documented were addressed during this visit. Due to the added complexity in care, I will continue to support Dimple in the subsequent management and with ongoing continuity of care.     30 minutes spent on the date of the encounter doing chart review, history and exam, documentation and further activities as noted above

## 2025-04-29 NOTE — LETTER
4/29/2025      Dimple Oviedo  [address]  [facility] [address]      Dear Colleague,    Thank you for referring your patient, Dimple Oviedo, to the [facility]. Please see a copy of my visit note below.      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    HISTORY OF PRESENT ILLNESS:  Ms. 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 Her oncologic history is as under.    Ms. Oviedo is doing well. She denies any complains suggestive of recurrent disease. She had covid at the beginning of the month with a cough. She is feeling better now. She has no new pains in her body. No hematuria. No fevers or chills. No chest pain or shortness of breath.     She is accompanied by her son who has PhD in agriculture and is a doctor but not a medical doctor.      ONCOLOGIC HISTORY:  1. 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   - Jan 2018 to September 2018- Persistent gross hematuria and underwent multiple procedures.    - 2/19/18 and 4/3/18- cystoscopies with bladder biopsies  which were negative for bladder tumor  - 1/17/18, 3/8/18, 7/26/18, 9/10/18- Multiple urine cytologies have come back positive by FISH for high-grade transitional cell carcinoma  - 9/10/18- Underwent a bilateral cystoureteroscopy with biopsy and brushing that showed  right upper tract cytology suspicious for high-grade urothelial ca. Right ureter brushing negative from that day. Left upper tract negative.  - 9/10/18- CT A/P without contrast showed new small bilateral pleural effusions and interstitial pulmonary edema  "but no evidence of locoregional or metastatic disease.  - 9/12/18- Presented to ED with oliguria, CRESENCIO, and mild hydronephrosis bilaterally on CT scan and underwent bilateral ureteral stent placment  - 11/13/2018- Right robotic nephroureterectomy, excision of ana-caval mass and LN excision by Stuart King. Pathology showed \"Right nephroureterectomy: Invasive high grade urothelial carcinoma measuring 3.5 cm, located in renal pelvis and invading through renal parenchyma into perinephric fat. Urothelial carcinoma in-situ present. Margins free of tumor. Ana-caval mass: Metastatic urothelial carcinoma involving one lymph node with at least 1 cm tumor deposit. Pericaval Extranodal Extension present. Five additional benign pericaval lymph nodes. Pathologic stage: pT4N1 (1 of 6 lymph nodes).\"  - 12/11/18- PET/CT whole body demonstrated significant residual FDG avid disease. For example, \"there is an FDG avid ill-defined pericaval mass immediately medial to the surgical clips measuring approximately 2.0 x 1.4 cm, with max SUV measuring up to12.2, see series 4 image 21. Additional FDG avid retrocaval and interaortocaval lymphadenopathy are noted.There is a 1.2 cm nodule in the right hemipelvis posterior lateral tothe bladder with max SUV measuring 8.3, see series 4 image 77. The bladder is incompletely distended.\" No suspicious thoracic or bone uptake.  - 12/12/18- Started adjuvant chemotherapy (carbo+gem) per POUT trial. Cycle 2 - 1/2/19. Cycle 3 - 1/23/19. Cycle 4 - 02/13/19.  - 1/22/19 - CT C/A/P with contrast compared with prior PET/CT: \"1. New well-circumscribed soft tissue pulmonary nodules of the right lower lobe and left upper lobe. Findings could be infectious, inflammatory, or represent metastatic disease. Continued attention on follow-up recommended. Postoperative changes of right nephrectomy. No evidence for local recurrence in the present study.\"  - 3/1/2019- CT C/A/P with contrast - \"1. Bilateral " "pulmonary nodules measuring up to 4 mm in the right lower lobe, previously measuring 8 mm. No new or enlarging pulmonary nodules. 2. No convincing evidence for metastatic disease in the abdomen, pelvis and bones.\"  - 6/3/2019- CT C/A/P with contrast - \"1. Surgical changes of right nephrectomy without evidence of residual or recurrent disease on this noncontrast exam.  Consider contrast enhanced examination on follow-up. 2. No evidence of metastatic disease in the abdomen, or pelvis on noncontrast CT.  Scattered tiny pulmonary nodules in the chest are not significantly changed.  Previously described prominent right lower lobe pulmonary nodule (previously measuring up to 8 mm) is no longer visualized. 3. Stable bilateral pulmonary nodules measuring maximally 4 mm.\"  - 09/11/19: CT C/A/P without contrast - \"1. Stable exam without evidence convincing for new disease. 2. Stable pulmonary nodules. 3. 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 4. Stable heterogeneous enlargement of the thyroid with underlying nodules suggested.\"  - 12/4/19: CT C/A/P without contrast - \"No acute change since prior exam. No evidence of recurrent or metastatic disease. Tiny lung nodules are stable. Prior right nephrectomy. Left renal artery aneurysm is stable.\"  - 04/08/20, 7/27/20: CT C/A/P with contrast - GABRIELLE.  - 01/12/21: CT C/A/P with contrast - \"1.  Slight increased size of a 6 mm left upper lobe nodule and new 4 mm linear opacity along the right major fissure. These are indeterminate but could represent progression of metastatic disease. 2.  Slight increased size of mildly enlarged mediastinal and hilar lymph nodes. 3.  Mild pulmonary edema. 4.  No recurrent or metastatic disease in the abdomen and pelvis. 5.  Mild stranding around the urinary bladder dome may be related to cystitis. Punctate focus of gas within the urinary bladder either secondary to infection or instrumentation. 6.  Enlarged multinodular thyroid " "gland.\"    2. Non-muscle invasive bladder cancer:  - 10/3/19: Cystoscopy showed a small area of erythema on retroflexion, possibly pre-existing or due to retroflexion of the scope. Urine cytology from that time showed cells suspicious for malignancy.   - 1/13/20: Bladder biopsy showed high grade urothelial carcinoma in-situ  - 2/12/20-3/18/20: Intravesical BCG therapy  - 7/24/20 and last cystoscopy was on the same day. It was negative. However, urine cytology was positive for high-grade urothelial carcinoma.   - 11/25/20-12/10/2020: 3 weekly doses of intravesical BCG 50mg.   - 12/10/20: Cytology suspicious and FISH urovysion positive for TCC.    REVIEW OF SYSTEMS: 14 point ROS negative other than the symptoms noted above in the HPI.    PAST MEDICAL AND SURGICAL HISTORY:   Past Medical History:   Diagnosis Date     CRESENCIO (acute kidney injury) 9/11/2018     Arthritis      Asthma 2/9/2022     Calculus of kidney      Chemotherapy-induced neutropenia 3/6/2019     Congestive heart failure (H)      Esophageal reflux      GERD (gastroesophageal reflux disease)      Hyperlipidemia LDL goal <130 5/9/2010     Malignant melanoma of skin of trunk, except scrotum (H)      Nonspecific abnormal finding     has living will 2004 -      Nontoxic multinodular goiter     no further eval /tx rec per pt     Osteopenia      Other psoriasis      Personal history of colonic polyps      PMR (polymyalgia rheumatica)      Restless legs syndrome 10/12/2005     Stress-induced cardiomyopathy      Undiagnosed cardiac murmurs      Unspecified constipation      Unspecified essential hypertension      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     Past Surgical History:   Procedure Laterality Date     ARTHROPLASTY KNEE Right 11/9/2020    Procedure: ARTHROPLASTY, KNEE, TOTAL, RIGHT;  Surgeon: Edward Otero MD;  [facility]     BIOPSY       COLONOSCOPY  2014     COMBINED CYSTOSCOPY, INSERT STENT URETER(S) Bilateral 9/12/2018    Procedure: COMBINED " CYSTOSCOPY, INSERT STENT URETER(S);  Cystoscopy Bilateral ureteral Stent Placement.;  Surgeon: Justin Henry MD;  [facility]     COMBINED CYSTOSCOPY, RETROGRADES, URETEROSCOPY, INSERT STENT Bilateral 4/3/2018    Procedure: COMBINED CYSTOSCOPY, RETROGRADES, URETEROSCOPY, INSERT STENT;;  Surgeon: Stuart King MD;  [facility]     COMBINED CYSTOSCOPY, RETROGRADES, URETEROSCOPY, INSERT STENT Bilateral 9/10/2018    Procedure: COMBINED CYSTOSCOPY, RETROGRADES, URETEROSCOPY, INSERT STENT;  Cystoscopy, Bilateral Ureteroscopy, Bladder Biopsies, Retrogram Pyelograms, Ureteral Washings and brushings, cysview;  Surgeon: Stuart King MD;  [facility]     COMBINED CYSTOSCOPY, RETROGRADES, URETEROSCOPY, INSERT STENT Left 8/26/2020    Procedure: Cystoscopy, Left Ureteral Wash, Left ureteral Retrograde Pyelogram;  Surgeon: Justin Henry MD;  [facility]     CYSTOSCOPY, BIOPSY BLADDER INSTILL OPTICAL AGENT N/A 4/3/2018    Procedure: CYSTOSCOPY, BIOPSY BLADDER INSTILL OPTICAL AGENT;  Cystoscopy, Blue Light Cystoscopy, Bladder Biopsies, Bilateral Selective ureteral washings for Cytology, Bilateral Retrograde Pyelograms, Bilateral Ureteroscopy;  Surgeon: Stuart King MD;  [facility]     CYSTOSCOPY, BIOPSY BLADDER, COMBINED N/A 2/19/2018    Procedure: COMBINED CYSTOSCOPY, BIOPSY BLADDER;  Cystoscopy, Bladder Biopsy;  Surgeon: Kenna La MD;  [facility]     CYSTOSCOPY, BIOPSY BLADDER, COMBINED N/A 8/26/2020    Procedure: Cystoscopy, biopsy bladder, combined;  Surgeon: Justin Henry MD;  [facility]     CYSTOSCOPY, FULGURATE BLADDER TUMOR, COMBINED N/A 1/13/2020    Procedure: CYSTOSCOPY, WITH  bladder biopsies and fulguration;  Surgeon: Stuart King MD;  [facility]     CYSTOSCOPY, REMOVE STENT(S), COMBINED  11/13/2018    Procedure: Flexible Cystoscopy with Stent Removal;  Surgeon: Stuart King MD;   [facility]     DAVINCI LYMPHADENECTOMY N/A 11/13/2018    Procedure: Davinci Lymphadenectomy ;  Surgeon: Stuart King MD;  [facility]     DAVINCI NEPHROURETERECTOMY N/A 11/13/2018    Procedure: Right DaVinci Assisted Nephroureterectomy;  Surgeon: Stuart King MD;  [facility]     ENDOSCOPIC ULTRASOUND LOWER GASTROINTESTIONAL TRACT (GI) N/A 10/30/2015    Procedure: ENDOSCOPIC ULTRASOUND LOWER GASTROINTESTIONAL TRACT (GI);  Surgeon: Daniel Jean-Baptiste MD;  [facility]     ESOPHAGOSCOPY, GASTROSCOPY, DUODENOSCOPY (EGD), COMBINED N/A 11/29/2023    Procedure: Esophagoscopy, gastroscopy, duodenoscopy (EGD), combined;  Surgeon: Justin Peñaloza MD;  [facility]     EYE SURGERY  12/4/17     INSERT PORT VASCULAR ACCESS Right 12/19/2018    Procedure: Chest Port Placement - right;  Surgeon: Stuart Chavez PA-C;  [facility]     IR CHEST PORT PLACEMENT > 5 YRS OF AGE  12/19/2018     IR PORT REMOVAL RIGHT  6/26/2019     LAPAROSCOPIC CHOLECYSTECTOMY WITH CHOLANGIOGRAMS N/A 11/1/2015    Procedure: LAPAROSCOPIC CHOLECYSTECTOMY WITH CHOLANGIOGRAMS;  Surgeon: Tonie Warren MD;  [facility]     REMOVE PORT VASCULAR ACCESS Right 6/26/2019    Procedure: Right Port Removal;  Surgeon: Froilan Irizarry PA-C;  [facility]     SURGICAL HISTORY OF -   1996    malignant melanoma     SURGICAL HISTORY OF -   1968    thyroid nodule     SURGICAL HISTORY OF -       [facility]     [facility] NONSPECIFIC PROCEDURE  2005    colonoscopy polyp repeat 2010     SOCIAL HISTORY:   Social History     Tobacco Use     Smoking status: Never     Passive exposure: Never     Smokeless tobacco: Never   Vaping Use     Vaping status: Never Used   Substance Use Topics     Alcohol use: No     Alcohol/week: 0.0 standard drinks of alcohol     Comment: rare     Drug use: No     FAMILY HISTORY:   Family History   Problem Relation Age of Onset     Cancer Father         dec - esophageal and laryngeal      Heart Disease Mother      Respiratory Mother         dec     Breast Cancer Daughter      Other Cancer Daughter      Thyroid Disease Daughter      Asthma Daughter      Hyperlipidemia Son      Diabetes Son      Anesthesia Reaction No family hx of      Deep Vein Thrombosis (DVT) No family hx of      ALLERGIES:   No Known Allergies  CURRENT MEDICATIONS:   Current Outpatient Medications:      alendronate (FOSAMAX) 70 MG tablet, TAKE 1 TABLET EVERY 7 DAYS AT LEAST 60 MINUTES BEFORE BREAKFAST AS DIRECTED., Disp: 12 tablet, Rfl: 3     dapagliflozin (FARXIGA) 10 MG TABS tablet, Take 1 tablet (10 mg) by mouth daily., Disp: 90 tablet, Rfl: 3     diltiazem ER (DILT-XR) 180 MG 24 hr capsule, TAKE 1 CAPSULE EVERY DAY, Disp: 90 capsule, Rfl: 3     ferrous sulfate 325 (65 Fe) MG TBEC EC tablet, Take 325 mg by mouth every morning  (Patient not taking: Reported on 12/16/2024), Disp: , Rfl:      furosemide (LASIX) 20 MG tablet, TAKE 1 TABLET TWICE DAILY IN THE MORNING AND AFTER LUNCH, Disp: 180 tablet, Rfl: 3     irbesartan (AVAPRO) 300 MG tablet, TAKE 1 TABLET EVERY DAY, Disp: 90 tablet, Rfl: 3     lovastatin (MEVACOR) 40 MG tablet, TAKE 1 TABLET AT BEDTIME (SCHEDULE ANNUAL VISIT, NEED FASTING LABS), Disp: 90 tablet, Rfl: 3     methimazole (TAPAZOLE) 5 MG tablet, TAKE 1 TABLET ALTERNATING WITH 1/2 TABLET EVERY OTHER DAY, Disp: 66 tablet, Rfl: 3     Omega-3 Fatty Acids (FISH OIL) 500 MG CAPS, Take 1 capsule by mouth every morning , Disp: , Rfl:      omeprazole (PRILOSEC) 20 MG DR capsule, TAKE 1 CAPSULE EVERY DAY, Disp: 90 capsule, Rfl: 3     propranolol ER (INDERAL LA) 60 MG 24 hr capsule, TAKE 1 CAPSULE EVERY DAY, Disp: 90 capsule, Rfl: 1     RESTASIS 0.05 % ophthalmic emulsion, , Disp: , Rfl:      rOPINIRole (REQUIP) 0.25 MG tablet, TAKE 1 TABLET IN THE AFTERNOON AND 2 TABLETS AT NIGHT, Disp: 270 tablet, Rfl: 0     sertraline (ZOLOFT) 100 MG tablet, TAKE 1 TABLET EVERY MORNING, Disp: 90 tablet, Rfl: 0     traZODone (DESYREL) 50  MG tablet, TAKE 1/2 TABLET AT BEDTIME, Disp: 45 tablet, Rfl: 0     XARELTO ANTICOAGULANT 15 MG TABS tablet, TAKE 1 TABLET (15 MG) BY MOUTH DAILY (WITH DINNER), Disp: 90 tablet, Rfl: 3    Current Facility-Administered Medications:      lidocaine (XYLOCAINE) 2 % external gel, , Urethral, Once, Justin Henry MD    PHYSICAL EXAMINATION:  General: No acute distress  HEENT: Sclera anicteric. Oral mucosa pink and moist.  No mucositis or thrush  Lymph: No lymphadenopathy in neck  Heart: Regular, rate, and rhythm  Lungs: Clear to ascultation bilaterally  Abdomen: Positive bowel sounds. Soft, non-distended, non-tender. No organomegaly or mass.   Extremities: no lower extremity edema  Neuro: Cranial nerves grossly intact  Rash: none  Vascular access: port    LABORATORY DATA:   Recent Labs   Lab Test 04/23/25  1003 04/23/25  0932 09/13/24  1150 09/13/24  1121 03/15/24  1143 12/18/23  1548 12/07/23  1613 11/14/23  0022   *  --  135  --   --  139 138 142   POTASSIUM 4.2  --  5.0  --   --  4.6 4.7 4.1   CHLORIDE 98  --  99  --   --  99 100 103   CO2 27  --  23  --   --  29 29 26   ANIONGAP 9  --  13  --   --  11 9 13   BUN 22.0  --  20.2  --   --  19.7 16.8 13.9   CR 1.04* 1.2* 1.01*  1.01* 1.1* 1.1* 1.04* 0.93 0.87   GLC 98  --  81  --   --  101* 103* 104*   SHREYA 9.2  --  9.2  --   --  9.4 9.4 9.1     Recent Labs   Lab Test 09/02/21  0644 09/01/21  1505 02/03/19  2102 12/12/18  1050 01/16/18  1247 01/16/18  0646 12/13/17  2236   MAG 2.4* 2.3 1.8 2.1 2.1   < > 2.0   PHOS  --   --   --   --   --   --  2.4*    < > = values in this interval not displayed.     Recent Labs   Lab Test 04/23/25  1003 03/15/24  1232 12/18/23  1548 12/07/23  1613 11/14/23  0022 11/13/23  1952 07/18/23  1158 02/27/23  1306   WBC 8.0 10.0 7.6 8.4 8.0 7.5 6.4 8.4   HGB 11.3* 12.9 12.7 12.8 12.3 13.0 12.9 12.8    263 214 220 191 221 209 192   MCV 83 93 98 96 98 98 97 97   NEUTROPHIL 73  --   --   --  68 70 71 73    < > = values in this  "interval not displayed.     Recent Labs   Lab Test 04/23/25  1003 09/13/24  1150 11/14/23  0022 11/13/23  1952 11/08/23  1431 12/09/21  2356 11/19/21  0911 08/11/21  1031 09/11/18  0612 07/17/18  1346   BILITOTAL 0.5 0.6 0.5   < > 0.5   < > 0.5 0.4   < >  --    ALKPHOS 93 98 86  --  89   < > 98 88   < >  --    ALT 18 31 21  --  17   < > 23 17   < >  --    AST 26 41  --   --  22   < > 15 14   < >  --    ALBUMIN 3.8 4.1 4.1   < > 4.2   < > 3.4 3.5   < >  --    LDH  --   --   --   --   --   --  180 176  --  204    < > = values in this interval not displayed.     TSH   Date Value Ref Range Status   12/13/2024 3.12 0.30 - 4.20 uIU/mL Final   05/28/2024 3.17 0.30 - 4.20 uIU/mL Final   06/22/2023 2.39 0.30 - 4.20 uIU/mL Final   05/20/2022 2.90 0.40 - 4.00 mU/L Final   09/02/2021 1.24 0.40 - 4.00 mU/L Final   07/05/2021 1.68 0.40 - 4.00 mU/L Final   05/27/2021 1.93 0.40 - 4.00 mU/L Final   01/27/2021 2.42 0.40 - 4.00 mU/L Final     No results for input(s): \"CEA\" in the last 06738 hours.  Results for orders placed or performed in visit on 04/23/25   CT Chest/Abdomen/Pelvis w Contrast    Narrative    EXAMINATION: CT CHEST/ABDOMEN/PELVIS W CONTRAST, 4/23/2025 9:45 AM    INDICATION: On surveillance after adjuvant chemotherapy for stage IV  ([id number]) urothelial Ca of rt renal pelvis-(nephroureterectomy   pericaval node Sx 11/13/18); Urothelial carcinoma of right distal  ureter (H); Pulmonary nodules; Graves disease    COMPARISON STUDY: CT chest abdomen and pelvis 9/13/2024    TECHNIQUE: CT scan of the chest, abdomen and pelvis was performed on  multidetector CT scanner using volumetric acquisition technique and  images were reconstructed in multiple planes with variable thickness  and reviewed on dedicated workstations.     CONTRAST: Isovue 370 88cc.   Without oral contrast.    CT scan radiation dose is optimized to minimum requisite dose using  automated dose modulation techniques.    FINDINGS:    Chest:   Mediastinum: Stable " enlarged multinodular thyroid gland. Heart size is  stable, mildly enlarged. No pericardial effusion. Calcifications of  the aortic valve. Normal caliber main pulmonary artery and ascending  aorta. Mild calcifications of the aortic arch. Normal branching  pattern of the great vessels, the common carotid artery is posteriorly  displaced by the enlarged right thyroid gland. Right paratracheal  lymph node measuring up to 11 mm in short axis (series 2 image 18,  similar to prior. Small hiatal hernia.    Pleura/lungs: No significant pleural effusion. No pneumothorax. Left  posteromedial enhancing pleural thickening measuring 2.2 x 0.6 cm  (3/193) previously measured 2.1 x 7.4 cm. More superior enhancing  peripheral nodule in the left lower thorax measuring 2.2 x 1.3 cm  (3/166) previously measured 1.2 x 0.6 cm. Adjacent medial pleural  nodule measuring 1.1 x 0.6 cm (3/159) is stable. Stable calcified  nodule in the posterior right lower lobe base.    Abdomen and Pelvis:  Liver: No mass. No intrahepatic biliary ductal dilation.  Hypodense  appearance relative to the spleen likely to represent hepatic  steatosis.    Biliary System: Gallbladder surgically absent.    Pancreas: No mass or pancreatic ductal dilation.    Adrenal glands: No mass or nodules    Spleen: Normal.    Kidneys: Right nephrectomy. No suspicious lesion in the surgical bed.  Unremarkable left kidney. 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 No hydronephrosis.    Gastrointestinal tract: Normal appendix. Normal caliber small bowel.   No significant wall abnormality.    Mesentery/peritoneum/retroperitoneum: No mass. No free fluid or air.    Lymph nodes: No significant lymphadenopathy.    Vasculature: Patent major abdominal vasculature.  Atherosclerotic  calcifications of the abdominal aorta and branching vessels.    Pelvis: Urinary bladder is normal.    Osseous structures: No aggressive or acute osseous  lesion.  Multilevel  degenerative changes of the visualized spine. With grade 1  anterolisthesis of L4 over L5, grade 1 retrolisthesis of L1 over L2.  Significant disc height loss at L5-S1. Along the left femoral  head/neck junction there is an anterior exostosis.      Soft tissues: Diastasis recti. Diffuse moderate muscle atrophy.      Impression    IMPRESSION:   1. Increased size of pleural-based lesions along the posterior and  medial left hemithorax. Findings are concerning for metastatic  disease.   2. Right nephrectomy without evidence of recurrent local disease.  3. Stable multinodular thyroid.  4. 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    I have personally reviewed the examination and initial interpretation  and I agree with the findings.    JUANITA PATRICIA MD         SYSTEM ID:  [id number]     *Note: Due to a large number of results and/or encounters for the requested time period, some results have not been displayed. A complete set of results can be found in Results Review.                I reviewed the above labs and imaging today.      ASSESSMENT AND PLAN: Ms. Oviedo is a delightful 89 year old lady with localized stage IV ([id number]) high-grade, muscle-invasive transitional cell carcinoma of right renal pelvis, status post right robotic nephroureterectomy and 4 cycles of adjuvant carbo/gem; as well as NMIBC.      Dimple continues to remain in good health. She has no new reported symptoms in history. We spent time reviewing her CT CAP from 3/15/24. She has a stable pleural based lung change and a stable thyroid nodule when compared to 09/13/23 scan. There previously was a small FDG uptake in the hepatic flexure of the colon. There are no bowel lesions depicted in her scan from 3/15/24.   - Please see my note from 09/2023 regarding biopsying the lesions. Overall, decision was to continue with surveillance with CT CAP every 6 months. If she has any symptoms from her lesion we could consider radiation even  without a biopsy.    1. Upper tract urothelial cancer:   - She completed 4 cycles of adjuvant carboplatin plus gemcitabine chemotherapy per POUT trial.    She completed adjuvant chemotherapy in Feb 2019.  - No residual side effects or evidence of recurrence.  - Reviewed restaging CT scan9/13/24 as above; there is no clear evidence of disease recurrence in chest/abd/pelvis.   She continues to have multiple pleural based pulmonary nodules which remain stable except one of the pleural nodule in left lower lobe has doubled in it's size based on measurements - volume increase could be a lot higher.   I have pasted representative image above. The only way to establish a diagnosis would be by doing a needle biopsy. Tereso who has come with her son and had her daughter in [place] on the phone for conversation. She agreed for the biopsy. I will enter a IR referral.        2. High grade urothelial carcinoma in situ:  - Bx proven carcinoma in situ in 1/2020, completed the first round of intravesical BCG treatment 2/2020-3/2020 and second in 11/2020-12/2020.  - She was initially followed by Dr. King and now is under care of Dr. Henry.   - She did have urine cytology and is negative; FISH by [facility] is pending    4. HERMAN:  - She follows Dr. Griffith in Cardiology for her h/o moderate AORTIC STENOSIS, CHF, and Afib with SOA and lymphedema with no cancer-related etiology evident.   - She has new anemia. Her MCV has dropped from 93 to 83 Fl. I am worried this could be iron deficiency. Her PCP did check ferritin which was adequate. I will get additional iron, TIBC, percent saturation, soluble transferrin receptor, B12 and folate checked.     Return to clinic after biopsy of pleural based lung nodule to see me.     The longitudinal plan of care for the diagnosis(es)/condition(s) as documented were addressed during this visit. Due to the added complexity in care, I will continue to support Dimple in the subsequent management and  with ongoing continuity of care.     30 minutes spent on the date of the encounter doing chart review, history and exam, documentation and further activities as noted above       Again, thank you for allowing me to participate in the care of your patient.        Sincerely,        Sd Fine MD    Electronically signed

## 2025-04-29 NOTE — CONSULTS
Outpatient IR Referral  04/29/25    Referring Provider: Dr. Fine  IR Referral Request: Left lung biopsy  Recommendations/Plan:   IR would like to see patient in clinic (in person)prior to scheduling biopsy to assess functional status and review biopsy.    Following visit, IR can order a CT lung biopsy with ultrasound guidance of lesion on series 3, image 162 if patient meets criteria.     Of note, no order for procedure has been placed yet. We will place order for procedure AFTER patient is evaluated in clinic.     Case and imaging was reviewed with Dr. Pop Love.  IR recommendations also relayed Epic messaging.    Brief History:    Dimple Oviedo is a 89 year old female with history of polymyalgia, GERD, obesity, HL, hyperthyroidism, hx of NSTEMI (angiogram in 2017 with no significant disease), HTN, hx of stress induced cardiomyopathy, a-fib with RVR (on xarelto), hx of melanoma, hx of urothelial ca, CKD, and iron deficiency anemia who IR is being asked to pursue a left lung biopsy, see series 3/image 166.     Prior discussions were to pursue surveillance of the lesions in 9/2023, plan was to pursue surveillance with CT CAP every 6 months. IR is now being asked to biopsy this lesion.     Pertinent Medications:    Current Outpatient Medications   Medication Sig Dispense Refill    alendronate (FOSAMAX) 70 MG tablet TAKE 1 TABLET EVERY 7 DAYS AT LEAST 60 MINUTES BEFORE BREAKFAST AS DIRECTED. 12 tablet 3    dapagliflozin (FARXIGA) 10 MG TABS tablet Take 1 tablet (10 mg) by mouth daily. 90 tablet 3    diltiazem ER (DILT-XR) 180 MG 24 hr capsule TAKE 1 CAPSULE EVERY DAY 90 capsule 3    ferrous sulfate 325 (65 Fe) MG TBEC EC tablet Take 325 mg by mouth every morning  (Patient not taking: Reported on 12/16/2024)      furosemide (LASIX) 20 MG tablet TAKE 1 TABLET TWICE DAILY IN THE MORNING AND AFTER LUNCH 180 tablet 3    irbesartan (AVAPRO) 300 MG tablet TAKE 1 TABLET EVERY DAY 90 tablet 3    lovastatin (MEVACOR)  40 MG tablet TAKE 1 TABLET AT BEDTIME (SCHEDULE ANNUAL VISIT, NEED FASTING LABS) 90 tablet 3    methimazole (TAPAZOLE) 5 MG tablet TAKE 1 TABLET ALTERNATING WITH 1/2 TABLET EVERY OTHER DAY 66 tablet 3    Omega-3 Fatty Acids (FISH OIL) 500 MG CAPS Take 1 capsule by mouth every morning       omeprazole (PRILOSEC) 20 MG DR capsule TAKE 1 CAPSULE EVERY DAY 90 capsule 3    propranolol ER (INDERAL LA) 60 MG 24 hr capsule TAKE 1 CAPSULE EVERY DAY 90 capsule 1    RESTASIS 0.05 % ophthalmic emulsion       rOPINIRole (REQUIP) 0.25 MG tablet TAKE 1 TABLET IN THE AFTERNOON AND 2 TABLETS AT NIGHT 270 tablet 0    sertraline (ZOLOFT) 100 MG tablet TAKE 1 TABLET EVERY MORNING 90 tablet 0    traZODone (DESYREL) 50 MG tablet TAKE 1/2 TABLET AT BEDTIME 45 tablet 0    XARELTO ANTICOAGULANT 15 MG TABS tablet TAKE 1 TABLET (15 MG) BY MOUTH DAILY (WITH DINNER) 90 tablet 3     Current Facility-Administered Medications   Medication Dose Route Frequency Provider Last Rate Last Admin    lidocaine (XYLOCAINE) 2 % external gel   Urethral Once Justin Henry MD           Pertinent Imaging Reviewed:    IMPRESSION:   1. Increased size of pleural-based lesions along the posterior and  medial left hemithorax. Findings are concerning for metastatic  disease.   2. Right nephrectomy without evidence of recurrent local disease.  3. Stable multinodular thyroid.  4. 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       Most Recent Labs:  Lab Results   Component Value Date    WBC 8.0 04/23/2025    WBC 7.1 05/11/2021     Lab Results   Component Value Date    RBC 4.39 04/23/2025    RBC 4.00 05/11/2021     Lab Results   Component Value Date    HGB 11.3 04/23/2025    HGB 12.6 05/11/2021     Lab Results   Component Value Date    HCT 36.6 04/23/2025    HCT 39.2 05/11/2021     Lab Results   Component Value Date     04/23/2025     05/11/2021    Last Comprehensive Metabolic Panel:  Lab Results   Component Value Date     (L) 04/23/2025     POTASSIUM 4.2 04/23/2025    CHLORIDE 98 04/23/2025    CO2 27 04/23/2025    ANIONGAP 9 04/23/2025    GLC 98 04/23/2025    BUN 22.0 04/23/2025    CR 1.04 (H) 04/23/2025    GFRESTIMATED 50 (L) 04/23/2025    SHREYA 9.2 04/23/2025      INR   Date Value Ref Range Status   11/13/2023 2.57 (H) 0.85 - 1.15 Final   07/20/2020 0.95 0.86 - 1.14 Final            Clinic visit with IR provider will be coordinated to discuss biopsy procedure, risks and benefits as needed.     What is the reason for the IR order request? Biopsy   What type of biopsy is needed? Lung   Laterality? Left   Patients clinical information/history? 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 No   Specimen orders should be placed per site protocol Acknowledge   Is there a specific location the exam needs to be scheduled at? No specific location required   Call Back # [phone]   Is the patient on aspirin, Plavix or blood thinners? No       Alva Gao PA-C  Interventional Radiology   [phone] (IR RN triage)

## 2025-05-08 ENCOUNTER — OFFICE VISIT (OUTPATIENT)
Dept: VASCULAR SURGERY | Facility: CLINIC | Age: OVER 89
End: 2025-05-08
Payer: MEDICARE

## 2025-05-08 VITALS
WEIGHT: 175 LBS | DIASTOLIC BLOOD PRESSURE: 65 MMHG | OXYGEN SATURATION: 94 % | BODY MASS INDEX: 36.58 KG/M2 | SYSTOLIC BLOOD PRESSURE: 129 MMHG | HEART RATE: 72 BPM

## 2025-05-08 DIAGNOSIS — I48.91 ATRIAL FIBRILLATION WITH RAPID VENTRICULAR RESPONSE (H): ICD-10-CM

## 2025-05-08 DIAGNOSIS — R91.1 LUNG NODULE: Primary | ICD-10-CM

## 2025-05-08 DIAGNOSIS — C66.1 UROTHELIAL CARCINOMA OF RIGHT DISTAL URETER (H): ICD-10-CM

## 2025-05-08 PROCEDURE — 99203 OFFICE O/P NEW LOW 30 MIN: CPT

## 2025-05-08 PROCEDURE — 3074F SYST BP LT 130 MM HG: CPT

## 2025-05-08 PROCEDURE — 1126F AMNT PAIN NOTED NONE PRSNT: CPT

## 2025-05-08 PROCEDURE — 3078F DIAST BP <80 MM HG: CPT

## 2025-05-08 ASSESSMENT — PAIN SCALES - GENERAL: PAINLEVEL_OUTOF10: NO PAIN (0)

## 2025-05-08 NOTE — NURSING NOTE
Chief Complaint   Patient presents with    New Patient     5/8/2025 visit for NEW VASCULAR PATIENT - To discuss biopsy       Vitals were taken and medications reconciled.    Froilan Durant, Visit Facilitator  9:59 AM

## 2025-05-08 NOTE — PROGRESS NOTES
INTERVENTIONAL RADIOLOGY CLINIC NOTE        Patient Name:  Dimple Oviedo  Date of Visit: 5/8/25  Referring Provider: Dr. Fine    REASON FOR VISIT:  To discuss tentative left lung biopsy and assess functional status      ASSESSMENT:  Summary: 89 year old female with a PMH of polymyalgia, GERD, obesity, HL, hyperthyroidism, hx of NSTEMI (angiogram in 2017 with no significant disease), HTN, hx of stress induced cardiomyopathy, a-fib with RVR (on xarelto), hx of melanoma, hx of urothelial ca, CKD, and iron deficiency anemia who presents for a discussion about an IR left lung lesion biopsy. She is accompanied by her son Geoff who is her . Patient is agile but walks with the assistance of a walker. She is not on supplemental oxygen. She denies chest pain, SOB, recent illnesses. Patient has no concerns about positioning for procedure. She is alert and oriented x 3.       PLAN:  -Patient will be scheduled for a CT-guided (with ultrasound backup) left lung biopsy (series 3, image 162). Order and surg path placed.  -Patient is on 15 mg of xarelto daily for h/o atrial fibrillation. Needs to hold 2 doses (CrCl < 30-50 mL/min). May reinitiate 24 hr post procedure. Epic message sent to Dr. Anahi Sagastume with cardiology to verify there is no concerns with this medication hold.  -The patient's medical data, including pertinent imaging, was reviewed.    -Education was given on the planned procedure and why it was requested, including a detailed description of interventional radiology's role.    -The use of moderate sedation was reviewed.    -Do not recommend flying in airplane for 48-72 hours due to risk of delayed pneumothorax  -Biopsy procedure along with risks, benefits of the procedure and sedation as well as how pathology results will be communicated have been discussed with the patient. Risk of lung biopsy was discussed which includes but is not limited to post procedure pain, bleeding that may require blood  transfusion or procedures to stop the bleeding, infection that may require treatment with medications, injury to adjacent nerves, air embolism, seeding of biopsy tract, vessels or organ systems, non diagnostic sample that may require repeat biopsy, injury to the lung that may require chest tube placement and overnight admission with potential for specialist consultation/intervention and the possibility of death.   -Patient is encouraged to pack overnight bag in the event that admission to hospital is needed.    -No further imaging will be necessary prior to the procedure.   -No further laboratory testing will be necessary prior to the procedure. Updated labs can be checked the day of the procedure.    -All questions and concerns are addressed. Patient verbalized understanding of procedure and instructions. Formal written consent to be obtained the day of procedure.       Bridget Mccormick PA-C  Interventional Radiology  IR Nurse Triage: [phone]  The IR outpatient  and can be reached at [phone].     ========================================================================    HISTORY OF PRESENT ILLNESS:  Dimple Oviedo is a 89 year old female with history of polymyalgia, GERD, obesity, HL, hyperthyroidism, hx of NSTEMI (angiogram in 2017 with no significant disease), HTN, hx of stress induced cardiomyopathy, a-fib with RVR (on xarelto), hx of melanoma, hx of urothelial ca, CKD, and iron deficiency anemia who IR is being asked to pursue a left lung biopsy, see series 3/image 166. Prior discussions were to pursue surveillance of the lesions in 9/2023, plan was to pursue surveillance with CT CAP every 6 months. IR is now being asked to biopsy this lesion.     Patient is followed by Dr. Anahi Sagastume with cardiology. Patient is on chronic anticoagulation and takes Xarelto 15 mg daily for A fib. She is on rate control strategy with propanolol and diltiazem.     Echo from 2023 noted normal biventricular  with moderate tricuspid regurgitation, mild aortic regurgitation with signs of pulmonary hypertension, RV systolic pressures 48 mmHg with dilated IVC. She takes irbesartan for hypertension. Denies chest pain, orthopnea, paroxysmal nocturnal dyspnea, palpitations or syncope.     Pt is able to lay flat, on side or prone, reporting no concerns with any position. She denies any history of issues with fentanyl or versed, sleep apnea, asthma, issues with receiving blood transfusions. She denies chest pain, SOB, cough.      PAST MEDICAL HISTORY:  Past Medical History:   Diagnosis Date    CRESENCIO (acute kidney injury) 9/11/2018    Arthritis     Asthma 2/9/2022    Calculus of kidney     Chemotherapy-induced neutropenia 3/6/2019    Congestive heart failure (H)     Esophageal reflux     GERD (gastroesophageal reflux disease)     Hyperlipidemia LDL goal <130 5/9/2010    Malignant melanoma of skin of trunk, except scrotum (H)     Nonspecific abnormal finding     has living will 2004 -     Nontoxic multinodular goiter     no further eval /tx rec per pt    Osteopenia     Other psoriasis     Personal history of colonic polyps     PMR (polymyalgia rheumatica)     Restless legs syndrome 10/12/2005    Stress-induced cardiomyopathy     Undiagnosed cardiac murmurs     Unspecified constipation     Unspecified essential hypertension     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        PAST SURGICAL HISTORY:  Past Surgical History:   Procedure Laterality Date    ARTHROPLASTY KNEE Right 11/9/2020    Procedure: ARTHROPLASTY, KNEE, TOTAL, RIGHT;  Surgeon: Edward Otero MD;  [facility]    BIOPSY      COLONOSCOPY  2014    COMBINED CYSTOSCOPY, INSERT STENT URETER(S) Bilateral 9/12/2018    Procedure: COMBINED CYSTOSCOPY, INSERT STENT URETER(S);  Cystoscopy Bilateral ureteral Stent Placement.;  Surgeon: Justin Henry MD;  [facility]    COMBINED CYSTOSCOPY, RETROGRADES, URETEROSCOPY, INSERT STENT Bilateral 4/3/2018    Procedure:  COMBINED CYSTOSCOPY, RETROGRADES, URETEROSCOPY, INSERT STENT;;  Surgeon: Stuart King MD;  [facility]    COMBINED CYSTOSCOPY, RETROGRADES, URETEROSCOPY, INSERT STENT Bilateral 9/10/2018    Procedure: COMBINED CYSTOSCOPY, RETROGRADES, URETEROSCOPY, INSERT STENT;  Cystoscopy, Bilateral Ureteroscopy, Bladder Biopsies, Retrogram Pyelograms, Ureteral Washings and brushings, cysview;  Surgeon: Stuart King MD;  [facility]    COMBINED CYSTOSCOPY, RETROGRADES, URETEROSCOPY, INSERT STENT Left 8/26/2020    Procedure: Cystoscopy, Left Ureteral Wash, Left ureteral Retrograde Pyelogram;  Surgeon: Justin Henry MD;  [facility]    CYSTOSCOPY, BIOPSY BLADDER INSTILL OPTICAL AGENT N/A 4/3/2018    Procedure: CYSTOSCOPY, BIOPSY BLADDER INSTILL OPTICAL AGENT;  Cystoscopy, Blue Light Cystoscopy, Bladder Biopsies, Bilateral Selective ureteral washings for Cytology, Bilateral Retrograde Pyelograms, Bilateral Ureteroscopy;  Surgeon: Stuart King MD;  [facility]    CYSTOSCOPY, BIOPSY BLADDER, COMBINED N/A 2/19/2018    Procedure: COMBINED CYSTOSCOPY, BIOPSY BLADDER;  Cystoscopy, Bladder Biopsy;  Surgeon: Kenna La MD;  [facility]    CYSTOSCOPY, BIOPSY BLADDER, COMBINED N/A 8/26/2020    Procedure: Cystoscopy, biopsy bladder, combined;  Surgeon: Justin Henry MD;  [facility]    CYSTOSCOPY, FULGURATE BLADDER TUMOR, COMBINED N/A 1/13/2020    Procedure: CYSTOSCOPY, WITH  bladder biopsies and fulguration;  Surgeon: Stuart King MD;  [facility]    CYSTOSCOPY, REMOVE STENT(S), COMBINED  11/13/2018    Procedure: Flexible Cystoscopy with Stent Removal;  Surgeon: Stuart King MD;  [facility]    DAVINCI LYMPHADENECTOMY N/A 11/13/2018    Procedure: Davinci Lymphadenectomy ;  Surgeon: Stuart King MD;  [facility]    DAVINCI NEPHROURETERECTOMY N/A 11/13/2018    Procedure: Right DaVinci Assisted  Nephroureterectomy;  Surgeon: Stuart King MD;  [facility]    ENDOSCOPIC ULTRASOUND LOWER GASTROINTESTIONAL TRACT (GI) N/A 10/30/2015    Procedure: ENDOSCOPIC ULTRASOUND LOWER GASTROINTESTIONAL TRACT (GI);  Surgeon: Daniel Jean-Baptiste MD;  [facility]    ESOPHAGOSCOPY, GASTROSCOPY, DUODENOSCOPY (EGD), COMBINED N/A 11/29/2023    Procedure: Esophagoscopy, gastroscopy, duodenoscopy (EGD), combined;  Surgeon: Justin Peñaloza MD;  [facility]    EYE SURGERY  12/4/17    INSERT PORT VASCULAR ACCESS Right 12/19/2018    Procedure: Chest Port Placement - right;  Surgeon: Stuart Chavez PA-C;  [facility]    IR CHEST PORT PLACEMENT > 5 YRS OF AGE  12/19/2018    IR PORT REMOVAL RIGHT  6/26/2019    LAPAROSCOPIC CHOLECYSTECTOMY WITH CHOLANGIOGRAMS N/A 11/1/2015    Procedure: LAPAROSCOPIC CHOLECYSTECTOMY WITH CHOLANGIOGRAMS;  Surgeon: Tonie Warren MD;  [facility]    REMOVE PORT VASCULAR ACCESS Right 6/26/2019    Procedure: Right Port Removal;  Surgeon: Froilan Irizarry PA-C;  [facility]    SURGICAL HISTORY OF -   1996    malignant melanoma    SURGICAL HISTORY OF -   1968    thyroid nodule    SURGICAL HISTORY OF -       D & C    ZZC NONSPECIFIC PROCEDURE  2005    colonoscopy polyp repeat 2010       PAST SOCIAL HISTORY  Social History     Socioeconomic History    Marital status:      Spouse name:     Number of children: 2    Years of education: Not on file    Highest education level: Not on file   Occupational History     Employer: RETIRED   Tobacco Use    Smoking status: Never     Passive exposure: Never    Smokeless tobacco: Never   Vaping Use    Vaping status: Never Used   Substance and Sexual Activity    Alcohol use: No     Alcohol/week: 0.0 standard drinks of alcohol     Comment: rare    Drug use: No    Sexual activity: Yes     Partners: Male   Other Topics Concern     Service No    Blood Transfusions No    Caffeine Concern Not Asked     Occupational Exposure Not Asked    Hobby Hazards Not Asked    Sleep Concern Not Asked    Stress Concern Not Asked    Weight Concern Not Asked    Special Diet Not Asked    Back Care Not Asked    Exercise Yes     Comment: curves    Bike Helmet Not Asked    Seat Belt Yes    Self-Exams Yes    Parent/sibling w/ CABG, MI or angioplasty before 65F 55M? No   Social History Narrative    December 7, 2009    Balanced Diet - Yes    Osteoporosis Prevention Measures - Dairy servings per day: 2 and Medication/Supplements (See current meds)    Regular Exercise -  Yes Describe curves, walking    Dental Exam - YES - Date: 10/2009    Eye Exam - YES - Date: 2008    Self Breast Exam - Yes    Abuse: Current or Past (Physical, Sexual or Emotional)- No    Do you feel safe in your environment - Yes    Guns stored in the home - No    Sunscreen used - Yes    Seatbelts used - Yes    Lipids -  YES - Date: 11/24/2009    Glucose -  YES - Date: 11/24/2009    Colon Cancer Screening - Colonoscopy 11/18/2005(date completed)    Hemoccults - YES - Date: 10/12/2004    Pap Test -  YES - Date: 09/22/2004    Do you have any concerns about STD's -  No    Mammography - YES - Date: 11/24/2009    DEXA - YES - Date: 11/20/2008    Immunizations reviewed and up to date - Yes    PAULETTE Spencer CMA             Social Drivers of Health     Financial Resource Strain: Low Risk  (6/29/2024)    Financial Resource Strain     Within the past 12 months, have you or your family members you live with been unable to get utilities (heat, electricity) when it was really needed?: No   Food Insecurity: Low Risk  (6/29/2024)    Food Insecurity     Within the past 12 months, did you worry that your food would run out before you got money to buy more?: No     Within the past 12 months, did the food you bought just not last and you didn t have money to get more?: No   Transportation Needs: Low Risk  (6/29/2024)    Transportation Needs     Within the past 12 months, has lack of  transportation kept you from medical appointments, getting your medicines, non-medical meetings or appointments, work, or from getting things that you need?: No   Physical Activity: Not on file   Stress: Not on file   Social Connections: Unknown (6/29/2024)    Social Connection and Isolation Panel [NHANES]     Frequency of Communication with Friends and Family: Not on file     Frequency of Social Gatherings with Friends and Family: More than three times a week     Attends Ep[employer] Services: Not on file     Active Member of Clubs or Organizations: Not on file     Attends Club or Organization Meetings: Not on file     Marital Status: Not on file   Interpersonal Safety: Low Risk  (7/2/2024)    Interpersonal Safety     Do you feel physically and emotionally safe where you currently live?: Yes     Within the past 12 months, have you been hit, slapped, kicked or otherwise physically hurt by someone?: No     Within the past 12 months, have you been humiliated or emotionally abused in other ways by your partner or ex-partner?: No   Housing Stability: Low Risk  (6/29/2024)    Housing Stability     Do you have housing? : Yes     Are you worried about losing your housing?: No     Current Outpatient Medications   Medication Sig Dispense Refill    alendronate (FOSAMAX) 70 MG tablet TAKE 1 TABLET EVERY 7 DAYS AT LEAST 60 MINUTES BEFORE BREAKFAST AS DIRECTED. 12 tablet 3    dapagliflozin (FARXIGA) 10 MG TABS tablet Take 1 tablet (10 mg) by mouth daily. 90 tablet 3    diltiazem ER (DILT-XR) 180 MG 24 hr capsule TAKE 1 CAPSULE EVERY DAY 90 capsule 3    furosemide (LASIX) 20 MG tablet TAKE 1 TABLET TWICE DAILY IN THE MORNING AND AFTER LUNCH 180 tablet 3    irbesartan (AVAPRO) 300 MG tablet TAKE 1 TABLET EVERY DAY 90 tablet 3    lovastatin (MEVACOR) 40 MG tablet TAKE 1 TABLET AT BEDTIME (SCHEDULE ANNUAL VISIT, NEED FASTING LABS) 90 tablet 3    methimazole (TAPAZOLE) 5 MG tablet TAKE 1 TABLET ALTERNATING WITH 1/2 TABLET EVERY OTHER  DAY 66 tablet 3    Omega-3 Fatty Acids (FISH OIL) 500 MG CAPS Take 1 capsule by mouth every morning       omeprazole (PRILOSEC) 20 MG DR capsule TAKE 1 CAPSULE EVERY DAY 90 capsule 3    propranolol ER (INDERAL LA) 60 MG 24 hr capsule TAKE 1 CAPSULE EVERY DAY 90 capsule 1    RESTASIS 0.05 % ophthalmic emulsion       rOPINIRole (REQUIP) 0.25 MG tablet TAKE 1 TABLET IN THE AFTERNOON AND 2 TABLETS AT NIGHT 270 tablet 0    sertraline (ZOLOFT) 100 MG tablet TAKE 1 TABLET EVERY MORNING 90 tablet 0    traZODone (DESYREL) 50 MG tablet TAKE 1/2 TABLET AT BEDTIME 45 tablet 0    XARELTO ANTICOAGULANT 15 MG TABS tablet TAKE 1 TABLET (15 MG) BY MOUTH DAILY (WITH DINNER) 90 tablet 3    ferrous sulfate 325 (65 Fe) MG TBEC EC tablet Take 325 mg by mouth every morning  (Patient not taking: Reported on 12/16/2024)       Current Facility-Administered Medications   Medication Dose Route Frequency Provider Last Rate Last Admin    lidocaine (XYLOCAINE) 2 % external gel   Urethral Once Justin Henry MD           ALLERGIES:   No Known Allergies    PHYSICAL EXAMINATION:  /65 (BP Location: Left arm, Patient Position: Sitting, Cuff Size: Adult Large)   Pulse 72   Wt 79.4 kg (175 lb)   SpO2 94%   BMI 36.58 kg/m     General: AAOx3. Pleasant in NAD.   HEENT: NC/AT  LUNGS: Normal respirations.  HEART: regular rate and rhythm  Mallampati score: 3    LABORATORY RESULTS:  Lab Results   Component Value Date    WBC 8.0 04/23/2025    WBC 7.1 05/11/2021     Lab Results   Component Value Date    HGB 11.3 04/23/2025    HGB 12.6 05/11/2021     Lab Results   Component Value Date     04/23/2025     05/11/2021     INR   Date Value Ref Range Status   11/13/2023 2.57 (H) 0.85 - 1.15 Final   07/20/2020 0.95 0.86 - 1.14 Final        DIAGNOSTIC IMAGING:  IMPRESSION:   1. Increased size of pleural-based lesions along the posterior and  medial left hemithorax. Findings are concerning for metastatic  disease.   2. Right nephrectomy  without evidence of recurrent local disease.  3. Stable multinodular thyroid.  4. 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      CONTRAST: Isovue 370 88cc.   Without oral contrast.     CT scan radiation dose is optimized to minimum requisite dose using  automated dose modulation techniques.     FINDINGS:     Chest:   Mediastinum: Stable enlarged multinodular thyroid gland. Heart size is  stable, mildly enlarged. No pericardial effusion. Calcifications of  the aortic valve. Normal caliber main pulmonary artery and ascending  aorta. Mild calcifications of the aortic arch. Normal branching  pattern of the great vessels, the common carotid artery is posteriorly  displaced by the enlarged right thyroid gland. Right paratracheal  lymph node measuring up to 11 mm in short axis (series 2 image 18,  similar to prior. Small hiatal hernia.     Pleura/lungs: No significant pleural effusion. No pneumothorax. Left  posteromedial enhancing pleural thickening measuring 2.2 x 0.6 cm  (3/193) previously measured 2.1 x 7.4 cm. More superior enhancing  peripheral nodule in the left lower thorax measuring 2.2 x 1.3 cm  (3/166) previously measured 1.2 x 0.6 cm. Adjacent medial pleural  nodule measuring 1.1 x 0.6 cm (3/159) is stable. Stable calcified  nodule in the posterior  right lower lobe base.     Abdomen and Pelvis:  Liver: No mass. No intrahepatic biliary ductal dilation.  Hypodense  appearance relative to the spleen likely to represent hepatic  steatosis.     Biliary System: Gallbladder surgically absent.     Pancreas: No mass or pancreatic ductal dilation.     Adrenal glands: No mass or nodules     Spleen: Normal.     Kidneys: Right nephrectomy. No suspicious lesion in the surgical bed.  Unremarkable left kidney. 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 No hydronephrosis.     Gastrointestinal tract: Normal appendix. Normal caliber small bowel.   No significant wall abnormality.     Mesentery/peritoneum/retroperitoneum: No mass. No free fluid or air.     Lymph nodes: No significant lymphadenopathy.     Vasculature: Patent major abdominal vasculature.  Atherosclerotic  calcifications of the abdominal aorta and branching vessels.     Pelvis: Urinary bladder is normal.     Osseous structures: No aggressive or acute osseous lesion.  Multilevel  degenerative changes of the visualized spine. With grade 1  anterolisthesis of L4 over L5, grade 1 retrolisthesis of L1 over L2.  Significant disc height loss at L5-S1. Along the left femoral  head/neck junction there is an anterior exostosis.      Soft tissues: Diastasis recti. Diffuse moderate muscle atrophy.                                                                      IMPRESSION:   1. Increased size of pleural-based lesions along the posterior and  medial left hemithorax. Findings are concerning for metastatic  disease.   2. Right nephrectomy without evidence of recurrent local disease.  3. Stable multinodular thyroid.  4. 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     I have personally reviewed the examination and initial interpretation  and I agree with the findings.     JUANITA PATRICIA MD        =====    CC:  1) Referring Provider  Alva Gao PA-C  [address] [place] [address]  [id number]  [place] [address]    2) Primary Care Provider  Pop Zapien MD, MD  [address] [facility] [address]  [place] [address]

## 2025-05-21 ENCOUNTER — TELEPHONE (OUTPATIENT)
Dept: CARDIOLOGY | Facility: CLINIC | Age: OVER 89
End: 2025-05-21
Payer: MEDICARE

## 2025-05-21 NOTE — TELEPHONE ENCOUNTER
Per MD antony Sagastume for a 2 dose hold on Xarelto prior to patients IR biopsy. Recommendation communicated to Kristina MACK RN via staff message.     SHASHANK DolanN, RN  RN Care Coordinator - General Cardiology   [facility] [place]

## 2025-05-21 NOTE — TELEPHONE ENCOUNTER
Patient Contacted for the patient to call back and schedule the following:    Appointment type: RTN EP  Provider: EP DARON  Return date: AUGUST 2025  Specialty phone number: [phone] OPT1  Additional appointment(s) needed: ECHO PRIOR  Additonal Notes: ANNUAL FOLLOW UP. ECHO PRIOR. PATIENT SAID TO CALL SON. LVM WITH SON. MYC SENT TOO

## 2025-06-02 ENCOUNTER — PATIENT OUTREACH (OUTPATIENT)
Dept: CARE COORDINATION | Facility: CLINIC | Age: OVER 89
End: 2025-06-02
Payer: MEDICARE

## 2025-06-03 ENCOUNTER — APPOINTMENT (OUTPATIENT)
Dept: GENERAL RADIOLOGY | Facility: CLINIC | Age: OVER 89
End: 2025-06-03
Attending: RADIOLOGY
Payer: MEDICARE

## 2025-06-03 ENCOUNTER — HOSPITAL ENCOUNTER (OUTPATIENT)
Facility: CLINIC | Age: OVER 89
Discharge: HOME | End: 2025-06-03
Attending: RADIOLOGY | Admitting: RADIOLOGY
Payer: MEDICARE

## 2025-06-03 ENCOUNTER — APPOINTMENT (OUTPATIENT)
Dept: INTERVENTIONAL RADIOLOGY/VASCULAR | Facility: CLINIC | Age: OVER 89
End: 2025-06-03
Payer: MEDICARE

## 2025-06-03 ENCOUNTER — APPOINTMENT (OUTPATIENT)
Dept: MEDSURG UNIT | Facility: CLINIC | Age: OVER 89
End: 2025-06-03
Attending: RADIOLOGY
Payer: MEDICARE

## 2025-06-03 VITALS
WEIGHT: 172 LBS | OXYGEN SATURATION: 90 % | DIASTOLIC BLOOD PRESSURE: 110 MMHG | HEIGHT: 57 IN | HEART RATE: 52 BPM | RESPIRATION RATE: 11 BRPM | BODY MASS INDEX: 37.11 KG/M2 | TEMPERATURE: 97.7 F | SYSTOLIC BLOOD PRESSURE: 135 MMHG

## 2025-06-03 DIAGNOSIS — I48.91 ATRIAL FIBRILLATION WITH RAPID VENTRICULAR RESPONSE (H): ICD-10-CM

## 2025-06-03 DIAGNOSIS — R91.1 LUNG NODULE: ICD-10-CM

## 2025-06-03 DIAGNOSIS — C66.1 UROTHELIAL CARCINOMA OF RIGHT DISTAL URETER (H): ICD-10-CM

## 2025-06-03 LAB
ERYTHROCYTE [DISTWIDTH] IN BLOOD BY AUTOMATED COUNT: 16.9 % (ref 10–15)
HCT VFR BLD AUTO: 38.2 % (ref 35–47)
HGB BLD-MCNC: 11.6 G/DL (ref 11.7–15.7)
INR BLD: 1 (ref 0.9–1.3)
MCH RBC QN AUTO: 26 PG (ref 26.5–33)
MCHC RBC AUTO-ENTMCNC: 30.4 G/DL (ref 31.5–36.5)
MCV RBC AUTO: 86 FL (ref 78–100)
PLATELET # BLD AUTO: 189 10E3/UL (ref 150–450)
RBC # BLD AUTO: 4.47 10E6/UL (ref 3.8–5.2)
WBC # BLD AUTO: 8.6 10E3/UL (ref 4–11)

## 2025-06-03 PROCEDURE — 85041 AUTOMATED RBC COUNT: CPT | Performed by: NURSE PRACTITIONER

## 2025-06-03 PROCEDURE — 71045 X-RAY EXAM CHEST 1 VIEW: CPT

## 2025-06-03 PROCEDURE — 88271 CYTOGENETICS DNA PROBE: CPT | Performed by: INTERNAL MEDICINE

## 2025-06-03 PROCEDURE — 71045 X-RAY EXAM CHEST 1 VIEW: CPT | Mod: 26 | Performed by: RADIOLOGY

## 2025-06-03 PROCEDURE — 99152 MOD SED SAME PHYS/QHP 5/>YRS: CPT | Performed by: RADIOLOGY

## 2025-06-03 PROCEDURE — 88341 IMHCHEM/IMCYTCHM EA ADD ANTB: CPT | Mod: 26 | Performed by: STUDENT IN AN ORGANIZED HEALTH CARE EDUCATION/TRAINING PROGRAM

## 2025-06-03 PROCEDURE — 272N000505 HC NEEDLE CR5

## 2025-06-03 PROCEDURE — 999N000133 HC STATISTIC PP CARE STAGE 2

## 2025-06-03 PROCEDURE — 36415 COLL VENOUS BLD VENIPUNCTURE: CPT | Performed by: NURSE PRACTITIONER

## 2025-06-03 PROCEDURE — 999N000142 HC STATISTIC PROCEDURE PREP ONLY

## 2025-06-03 PROCEDURE — 85610 PROTHROMBIN TIME: CPT

## 2025-06-03 PROCEDURE — 88305 TISSUE EXAM BY PATHOLOGIST: CPT | Mod: 26 | Performed by: STUDENT IN AN ORGANIZED HEALTH CARE EDUCATION/TRAINING PROGRAM

## 2025-06-03 PROCEDURE — 85018 HEMOGLOBIN: CPT | Performed by: NURSE PRACTITIONER

## 2025-06-03 PROCEDURE — 88365 INSITU HYBRIDIZATION (FISH): CPT | Mod: 26 | Performed by: STUDENT IN AN ORGANIZED HEALTH CARE EDUCATION/TRAINING PROGRAM

## 2025-06-03 PROCEDURE — 99153 MOD SED SAME PHYS/QHP EA: CPT

## 2025-06-03 PROCEDURE — 250N000011 HC RX IP 250 OP 636

## 2025-06-03 PROCEDURE — 88342 IMHCHEM/IMCYTCHM 1ST ANTB: CPT | Mod: 26 | Performed by: STUDENT IN AN ORGANIZED HEALTH CARE EDUCATION/TRAINING PROGRAM

## 2025-06-03 PROCEDURE — 250N000009 HC RX 250: Mod: JW

## 2025-06-03 PROCEDURE — 999N000065 XR CHEST 1 VIEW

## 2025-06-03 PROCEDURE — 88341 IMHCHEM/IMCYTCHM EA ADD ANTB: CPT | Mod: TC | Performed by: INTERNAL MEDICINE

## 2025-06-03 PROCEDURE — 88360 TUMOR IMMUNOHISTOCHEM/MANUAL: CPT | Mod: 26 | Performed by: STUDENT IN AN ORGANIZED HEALTH CARE EDUCATION/TRAINING PROGRAM

## 2025-06-03 PROCEDURE — 32408 CORE NDL BX LNG/MED PERQ: CPT | Performed by: RADIOLOGY

## 2025-06-03 RX ORDER — NALOXONE HYDROCHLORIDE 0.4 MG/ML
0.2 INJECTION, SOLUTION INTRAMUSCULAR; INTRAVENOUS; SUBCUTANEOUS
Status: DISCONTINUED | OUTPATIENT
Start: 2025-06-03 | End: 2025-06-03 | Stop reason: HOSPADM

## 2025-06-03 RX ORDER — FLUMAZENIL 0.1 MG/ML
0.2 INJECTION, SOLUTION INTRAVENOUS
Status: DISCONTINUED | OUTPATIENT
Start: 2025-06-03 | End: 2025-06-03 | Stop reason: HOSPADM

## 2025-06-03 RX ORDER — LIDOCAINE 40 MG/G
CREAM TOPICAL
Status: DISCONTINUED | OUTPATIENT
Start: 2025-06-03 | End: 2025-06-03 | Stop reason: HOSPADM

## 2025-06-03 RX ORDER — NALOXONE HYDROCHLORIDE 0.4 MG/ML
0.4 INJECTION, SOLUTION INTRAMUSCULAR; INTRAVENOUS; SUBCUTANEOUS
Status: DISCONTINUED | OUTPATIENT
Start: 2025-06-03 | End: 2025-06-03 | Stop reason: HOSPADM

## 2025-06-03 RX ORDER — FENTANYL CITRATE 50 UG/ML
25-50 INJECTION, SOLUTION INTRAMUSCULAR; INTRAVENOUS EVERY 5 MIN PRN
Refills: 0 | Status: DISCONTINUED | OUTPATIENT
Start: 2025-06-03 | End: 2025-06-03 | Stop reason: HOSPADM

## 2025-06-03 RX ADMIN — FENTANYL CITRATE 25 MCG: 50 INJECTION INTRAMUSCULAR; INTRAVENOUS at 10:42

## 2025-06-03 RX ADMIN — MIDAZOLAM 0.5 MG: 1 INJECTION INTRAMUSCULAR; INTRAVENOUS at 10:42

## 2025-06-03 RX ADMIN — FENTANYL CITRATE 25 MCG: 50 INJECTION INTRAMUSCULAR; INTRAVENOUS at 11:08

## 2025-06-03 RX ADMIN — LIDOCAINE HYDROCHLORIDE 5 ML: 10 INJECTION, SOLUTION EPIDURAL; INFILTRATION; INTRACAUDAL; PERINEURAL at 11:32

## 2025-06-03 RX ADMIN — FENTANYL CITRATE 25 MCG: 50 INJECTION INTRAMUSCULAR; INTRAVENOUS at 10:58

## 2025-06-03 RX ADMIN — MIDAZOLAM 0.5 MG: 1 INJECTION INTRAMUSCULAR; INTRAVENOUS at 10:58

## 2025-06-03 RX ADMIN — MIDAZOLAM 0.5 MG: 1 INJECTION INTRAMUSCULAR; INTRAVENOUS at 11:08

## 2025-06-03 ASSESSMENT — ACTIVITIES OF DAILY LIVING (ADL)
ADLS_ACUITY_SCORE: 52

## 2025-06-03 NOTE — DISCHARGE INSTRUCTIONS
[facility]    Interventional Radiology  Patient Instructions Following Lung Biopsy    AFTER YOU GO HOME  If you were given sedation, for the first 24 hours: we recommend that an adult stay with you, DO NOT drive or operate machinery at home or at work, DO NOT smoke, DO NOT make any important or legal decisions.  DO NOT drink alcoholic beverages the day of your procedure  Drink plenty of fluids   Resume your regular diet, unless otherwise instructed by your Primary Physician  Relax and take it easy for 48 hours  DO NOT do any strenuous exercise or lifting (> 10 lbs) for at least 3 days following your procedure  Keep the dressing dry and in place for 24 hours. Replace with Band aid for 2 days.  Never leave a wet dressing in place.  Do not take a shower for at least 12 hours following your procedure. No tub bath, hot tub, or swimming for 5 days.  There should be minimum drainage from the biopsy site    CALL THE PHYSICIAN IF:  You start bleeding from the procedure site.  If you do start to bleed from that site, lie down flat and hold pressure on the site for a minimum of 10 minutes.  Your physician will tell you if you need to return to the hospital  You develop nausea or vomiting  You have excessive swelling, redness, or tenderness at the site  You have drainage that looks like it is infected.  You experience severe pain  You develop hives or a rash or unexplained itching  You develop shortness of breath  You develop a temperature of 101 degrees F or greater  You develop chest pain or cough up blood, lightheadedness or fainting             Resume Xarelto tomorrow      [facility] INTERVENTIONAL RADIOLOGY DEPARTMENT  Procedure Physician: Dr. Carrasco                                      Date of procedure: Caryn 3, 2025  Telephone Numbers: [phone]      Monday-Friday 7:30 am to 4:00 pm  [phone] After 4:00 pm Monday-Friday, Weekends & Holidays. Option #4 for the , and then ask for the  Interventional Radiologist on call.  Someone is on call 24 hrs/day  [facility] toll free number: [phone] Monday-Friday 8:00 am to 4:30 pm  [facility] Emergency Dept: [phone]

## 2025-06-03 NOTE — PROGRESS NOTES
Patient prepped for CT guided lung biopsy. Patient reports holding Xarelto since Friday 3-30-25. PIV in L AC. Labs WNL, iSTAT INR 1.0.   Appropriately NPO. [place]. Son Issa with patient.

## 2025-06-03 NOTE — PROGRESS NOTES
Patient returned s/p left lung CT guided biopsy. Chest Xray completed with IR upon arrival, repeat chest xray ordered for 1 hour at 1245. VSS except  02 86% on room air, 1L 02 via NC applied 02 92%, patient denies any difficulty breathing.  2 hour bedrest until 1330.  Will discharge once meeting criteria.

## 2025-06-03 NOTE — IR NOTE
Patient Name: Dimple Oviedo  Medical Record Number: [id number]  Today's Date: 6/3/2025    Procedure: Left Lung Nodule biopsy  Proceduralist: GREG Kaur  Pathology present: Yes  Brief completed: NA    Procedure Start: 1106  Procedure end: 1131  Sedation medications administered: 1.5 mg of versed, 75 mcg of fentanyl  Sedation time: 25 minutes ([id number])    Report given to: 2A RN  : CANDIS    Other Notes: Pt arrived to IR room CT2 from . Consent reviewed. Pt denies any questions or concerns regarding procedure. Pt positioned prone and monitored per protocol. Pt tolerated procedure without any noted complications. Left lung nodule site cleansed and dressed per protocol. Pt transferred back to .     Patient sent to xray. Will need repeat in 1-2 hours.

## 2025-06-03 NOTE — PROGRESS NOTES
Tolerated bedrest without issues.  Second CXR taken, read by Viv Forrest, and OK'd for discharge.  Tolerated food, fluids, ambulation and urination.  Left upper back biopsy site covered with a Tegaderm, CDI.  Reviewed discharge instructions with Dimple and her son.  Discharged to home with son.

## 2025-06-03 NOTE — PRE-PROCEDURE
GENERAL PRE-PROCEDURE:   Procedure:  Image guided left lung nodule biopsy and possible chest tube insertion  Date/Time:  6/3/2025 9:02 AM    Verbal consent obtained?: Yes    Written consent obtained?: Yes    Risks and benefits: Risks, benefits and alternatives were discussed    Consent given by:  Patient  Patient states understanding of procedure being performed: Yes    Patient's understanding of procedure matches consent: Yes    Procedure consent matches procedure scheduled: Yes    Expected level of sedation:  Moderate  Appropriately NPO:  Yes  ASA Class:  2  Mallampati  :  Grade 2- soft palate, base of uvula, tonsillar pillars, and portion of posterior pharyngeal wall visible  Lungs:  Lungs clear with good breath sounds bilaterally  Heart:  Normal heart sounds and rate  History & Physical reviewed:  History and physical reviewed and no updates needed  Statement of review:  I have reviewed the lab findings, diagnostic data, medications, and the plan for sedation

## 2025-06-03 NOTE — PROCEDURES
Brief Procedure Note    Name: Dimple Oviedo   Admission Date: 6/3/2025  MRN: [id number]  Sex: female  : 1933   Age: 89 year old    Attending Physician: Dr. Meghan Espitia  Resident Physician: N/A    Diagnosis and Procedure  Pre-Operative Diagnosis: Left lower lobe subpleural lung lesion, history of urothelial carcinoma  Post-Operative Diagnosis: Same    Procedure: Lung biopsy  Anesthesia: Nurse sedation    Procedure Details  Technique: CT  Findings: Uneventful CT-guided biopsy of posterior left lower lobe subpleural lung nodule.  Onsite pathology team deemed samples to be adequate. Tiny pneumothorax at end of procedure.  EBL: None        For the final procedural/operative note, please look under: Chart Review > Imaging    Meghan Espitia MD

## 2025-06-08 LAB
PATH REPORT.COMMENTS IMP SPEC: ABNORMAL
PATH REPORT.COMMENTS IMP SPEC: YES
PATH REPORT.FINAL DX SPEC: ABNORMAL
PATH REPORT.GROSS SPEC: ABNORMAL
PATH REPORT.MICROSCOPIC SPEC OTHER STN: ABNORMAL
PATH REPORT.RELEVANT HX SPEC: ABNORMAL
PHOTO IMAGE: ABNORMAL

## 2025-06-09 ENCOUNTER — PATIENT OUTREACH (OUTPATIENT)
Dept: CARE COORDINATION | Facility: CLINIC | Age: OVER 89
End: 2025-06-09
Payer: MEDICARE

## 2025-06-10 ENCOUNTER — ONCOLOGY VISIT (OUTPATIENT)
Dept: ONCOLOGY | Facility: CLINIC | Age: OVER 89
End: 2025-06-10
Attending: INTERNAL MEDICINE
Payer: MEDICARE

## 2025-06-10 VITALS
SYSTOLIC BLOOD PRESSURE: 138 MMHG | RESPIRATION RATE: 18 BRPM | BODY MASS INDEX: 37.87 KG/M2 | OXYGEN SATURATION: 93 % | HEART RATE: 78 BPM | TEMPERATURE: 98.1 F | WEIGHT: 175 LBS | DIASTOLIC BLOOD PRESSURE: 64 MMHG

## 2025-06-10 DIAGNOSIS — C66.1 UROTHELIAL CARCINOMA OF RIGHT DISTAL URETER (H): Primary | ICD-10-CM

## 2025-06-10 DIAGNOSIS — D64.9 ANEMIA, UNSPECIFIED TYPE: ICD-10-CM

## 2025-06-10 DIAGNOSIS — R91.8 PULMONARY NODULES: ICD-10-CM

## 2025-06-10 DIAGNOSIS — C83.398 DIFFUSE LARGE B-CELL LYMPHOMA OF EXTRANODAL SITE EXCLUDING SPLEEN AND OTHER SOLID ORGANS (H): ICD-10-CM

## 2025-06-10 DIAGNOSIS — E05.00 GRAVES DISEASE: ICD-10-CM

## 2025-06-10 PROCEDURE — G0463 HOSPITAL OUTPT CLINIC VISIT: HCPCS | Performed by: INTERNAL MEDICINE

## 2025-06-10 PROCEDURE — G2211 COMPLEX E/M VISIT ADD ON: HCPCS | Performed by: INTERNAL MEDICINE

## 2025-06-10 PROCEDURE — 99215 OFFICE O/P EST HI 40 MIN: CPT | Performed by: INTERNAL MEDICINE

## 2025-06-10 ASSESSMENT — PAIN SCALES - GENERAL: PAINLEVEL_OUTOF10: NO PAIN (0)

## 2025-06-10 NOTE — PROGRESS NOTES
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    HISTORY OF PRESENT ILLNESS:  Ms. 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 Her oncologic history is as under.    Ms. Oviedo is doing well.  She has no new pains in her body. No hematuria. No fevers or chills. No chest pain or shortness of breath.     She is accompanied by her family and is here to review results of her biopsy.      ONCOLOGIC HISTORY:  1. 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   - Jan 2018 to September 2018- Persistent gross hematuria and underwent multiple procedures.    - 2/19/18 and 4/3/18- cystoscopies with bladder biopsies  which were negative for bladder tumor  - 1/17/18, 3/8/18, 7/26/18, 9/10/18- Multiple urine cytologies have come back positive by FISH for high-grade transitional cell carcinoma  - 9/10/18- Underwent a bilateral cystoureteroscopy with biopsy and brushing that showed  right upper tract cytology suspicious for high-grade urothelial ca. Right ureter brushing negative from that day. Left upper tract negative.  - 9/10/18- CT A/P without contrast showed new small bilateral pleural effusions and interstitial pulmonary edema but no evidence of locoregional or metastatic disease.  - 9/12/18- Presented to ED with oliguria, CRESENCIO, and mild hydronephrosis bilaterally on CT scan and underwent bilateral ureteral stent placment  - 11/13/2018- Right robotic nephroureterectomy, excision of sandip-caval mass and LN excision by Stuart King. Pathology showed \"Right nephroureterectomy: Invasive high grade urothelial carcinoma measuring 3.5 cm, " "located in renal pelvis and invading through renal parenchyma into perinephric fat. Urothelial carcinoma in-situ present. Margins free of tumor. Ana-caval mass: Metastatic urothelial carcinoma involving one lymph node with at least 1 cm tumor deposit. Pericaval Extranodal Extension present. Five additional benign pericaval lymph nodes. Pathologic stage: pT4N1 (1 of 6 lymph nodes).\"  - 12/11/18- PET/CT whole body demonstrated significant residual FDG avid disease. For example, \"there is an FDG avid ill-defined pericaval mass immediately medial to the surgical clips measuring approximately 2.0 x 1.4 cm, with max SUV measuring up to12.2, see series 4 image 21. Additional FDG avid retrocaval and interaortocaval lymphadenopathy are noted.There is a 1.2 cm nodule in the right hemipelvis posterior lateral tothe bladder with max SUV measuring 8.3, see series 4 image 77. The bladder is incompletely distended.\" No suspicious thoracic or bone uptake.  - 12/12/18- Started adjuvant chemotherapy (carbo+gem) per POUT trial. Cycle 2 - 1/2/19. Cycle 3 - 1/23/19. Cycle 4 - 02/13/19.  - 1/22/19 - CT C/A/P with contrast compared with prior PET/CT: \"1. New well-circumscribed soft tissue pulmonary nodules of the right lower lobe and left upper lobe. Findings could be infectious, inflammatory, or represent metastatic disease. Continued attention on follow-up recommended. Postoperative changes of right nephrectomy. No evidence for local recurrence in the present study.\"  - 3/1/2019- CT C/A/P with contrast - \"1. Bilateral pulmonary nodules measuring up to 4 mm in the right lower lobe, previously measuring 8 mm. No new or enlarging pulmonary nodules. 2. No convincing evidence for metastatic disease in the abdomen, pelvis and bones.\"  - 6/3/2019- CT C/A/P with contrast - \"1. Surgical changes of right nephrectomy without evidence of residual or recurrent disease on this noncontrast exam.  Consider contrast enhanced examination on follow-up. " "2. No evidence of metastatic disease in the abdomen, or pelvis on noncontrast CT.  Scattered tiny pulmonary nodules in the chest are not significantly changed.  Previously described prominent right lower lobe pulmonary nodule (previously measuring up to 8 mm) is no longer visualized. 3. Stable bilateral pulmonary nodules measuring maximally 4 mm.\"  - 09/11/19: CT C/A/P without contrast - \"1. Stable exam without evidence convincing for new disease. 2. Stable pulmonary nodules. 3. 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 4. Stable heterogeneous enlargement of the thyroid with underlying nodules suggested.\"  - 12/4/19: CT C/A/P without contrast - \"No acute change since prior exam. No evidence of recurrent or metastatic disease. Tiny lung nodules are stable. Prior right nephrectomy. Left renal artery aneurysm is stable.\"  - 04/08/20, 7/27/20: CT C/A/P with contrast - GABRIELLE.  - 01/12/21: CT C/A/P with contrast - \"1.  Slight increased size of a 6 mm left upper lobe nodule and new 4 mm linear opacity along the right major fissure. These are indeterminate but could represent progression of metastatic disease. 2.  Slight increased size of mildly enlarged mediastinal and hilar lymph nodes. 3.  Mild pulmonary edema. 4.  No recurrent or metastatic disease in the abdomen and pelvis. 5.  Mild stranding around the urinary bladder dome may be related to cystitis. Punctate focus of gas within the urinary bladder either secondary to infection or instrumentation. 6.  Enlarged multinodular thyroid gland.\"    2. Non-muscle invasive bladder cancer:  - 10/3/19: Cystoscopy showed a small area of erythema on retroflexion, possibly pre-existing or due to retroflexion of the scope. Urine cytology from that time showed cells suspicious for malignancy.   - 1/13/20: Bladder biopsy showed high grade urothelial carcinoma in-situ  - 2/12/20-3/18/20: Intravesical BCG therapy  - 7/24/20 and last cystoscopy was on the same day. It was " negative. However, urine cytology was positive for high-grade urothelial carcinoma.   - 11/25/20-12/10/2020: 3 weekly doses of intravesical BCG 50mg.   - 12/10/20: Cytology suspicious and FISH urovysion positive for TCC.    REVIEW OF SYSTEMS: 14 point ROS negative other than the symptoms noted above in the HPI.    PAST MEDICAL AND SURGICAL HISTORY:   Past Medical History:   Diagnosis Date    CRESENCIO (acute kidney injury) 9/11/2018    Arthritis     Asthma 2/9/2022    Calculus of kidney     Chemotherapy-induced neutropenia 3/6/2019    Congestive heart failure (H)     Esophageal reflux     GERD (gastroesophageal reflux disease)     Hyperlipidemia LDL goal <130 5/9/2010    Malignant melanoma of skin of trunk, except scrotum (H)     Nonspecific abnormal finding     has living will 2004 -     Nontoxic multinodular goiter     no further eval /tx rec per pt    Osteopenia     Other psoriasis     Personal history of colonic polyps     PMR (polymyalgia rheumatica)     Restless legs syndrome 10/12/2005    Stress-induced cardiomyopathy     Undiagnosed cardiac murmurs     Unspecified constipation     Unspecified essential hypertension     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     Past Surgical History:   Procedure Laterality Date    ARTHROPLASTY KNEE Right 11/9/2020    Procedure: ARTHROPLASTY, KNEE, TOTAL, RIGHT;  Surgeon: Edward Otero MD;  [facility]    BIOPSY      COLONOSCOPY  2014    COMBINED CYSTOSCOPY, INSERT STENT URETER(S) Bilateral 9/12/2018    Procedure: COMBINED CYSTOSCOPY, INSERT STENT URETER(S);  Cystoscopy Bilateral ureteral Stent Placement.;  Surgeon: Justin Henry MD;  [facility]    COMBINED CYSTOSCOPY, RETROGRADES, URETEROSCOPY, INSERT STENT Bilateral 4/3/2018    Procedure: COMBINED CYSTOSCOPY, RETROGRADES, URETEROSCOPY, INSERT STENT;;  Surgeon: Stuart King MD;  [facility]    COMBINED CYSTOSCOPY, RETROGRADES, URETEROSCOPY, INSERT STENT Bilateral 9/10/2018    Procedure:  COMBINED CYSTOSCOPY, RETROGRADES, URETEROSCOPY, INSERT STENT;  Cystoscopy, Bilateral Ureteroscopy, Bladder Biopsies, Retrogram Pyelograms, Ureteral Washings and brushings, cysview;  Surgeon: Stuart King MD;  [facility]    COMBINED CYSTOSCOPY, RETROGRADES, URETEROSCOPY, INSERT STENT Left 8/26/2020    Procedure: Cystoscopy, Left Ureteral Wash, Left ureteral Retrograde Pyelogram;  Surgeon: Justin Henry MD;  [facility]    CYSTOSCOPY, BIOPSY BLADDER INSTILL OPTICAL AGENT N/A 4/3/2018    Procedure: CYSTOSCOPY, BIOPSY BLADDER INSTILL OPTICAL AGENT;  Cystoscopy, Blue Light Cystoscopy, Bladder Biopsies, Bilateral Selective ureteral washings for Cytology, Bilateral Retrograde Pyelograms, Bilateral Ureteroscopy;  Surgeon: Stuart King MD;  [facility]    CYSTOSCOPY, BIOPSY BLADDER, COMBINED N/A 2/19/2018    Procedure: COMBINED CYSTOSCOPY, BIOPSY BLADDER;  Cystoscopy, Bladder Biopsy;  Surgeon: Kenna La MD;  [facility]    CYSTOSCOPY, BIOPSY BLADDER, COMBINED N/A 8/26/2020    Procedure: Cystoscopy, biopsy bladder, combined;  Surgeon: Justin Henry MD;  [facility]    CYSTOSCOPY, FULGURATE BLADDER TUMOR, COMBINED N/A 1/13/2020    Procedure: CYSTOSCOPY, WITH  bladder biopsies and fulguration;  Surgeon: Stuart King MD;  [facility]    CYSTOSCOPY, REMOVE STENT(S), COMBINED  11/13/2018    Procedure: Flexible Cystoscopy with Stent Removal;  Surgeon: Stuart King MD;  [facility]    DAVINCI LYMPHADENECTOMY N/A 11/13/2018    Procedure: Davinci Lymphadenectomy ;  Surgeon: Stuart King MD;  [facility]    DAVINCI NEPHROURETERECTOMY N/A 11/13/2018    Procedure: Right DaVinci Assisted Nephroureterectomy;  Surgeon: Stuart King MD;  [facility]    ENDOSCOPIC ULTRASOUND LOWER GASTROINTESTIONAL TRACT (GI) N/A 10/30/2015    Procedure: ENDOSCOPIC ULTRASOUND LOWER GASTROINTESTIONAL TRACT (GI);   Surgeon: Daniel Jean-Baptiste MD;  [facility]    ESOPHAGOSCOPY, GASTROSCOPY, DUODENOSCOPY (EGD), COMBINED N/A 11/29/2023    Procedure: Esophagoscopy, gastroscopy, duodenoscopy (EGD), combined;  Surgeon: Justin Peñaloza MD;  [facility]    EYE SURGERY  12/4/17    INSERT PORT VASCULAR ACCESS Right 12/19/2018    Procedure: Chest Port Placement - right;  Surgeon: Stuart Chavez PA-C;  [facility]    IR CHEST PORT PLACEMENT > 5 YRS OF AGE  12/19/2018    IR PORT REMOVAL RIGHT  6/26/2019    LAPAROSCOPIC CHOLECYSTECTOMY WITH CHOLANGIOGRAMS N/A 11/1/2015    Procedure: LAPAROSCOPIC CHOLECYSTECTOMY WITH CHOLANGIOGRAMS;  Surgeon: Tonie Warren MD;  [facility]    REMOVE PORT VASCULAR ACCESS Right 6/26/2019    Procedure: Right Port Removal;  Surgeon: Froilan Irizarry PA-C;  [facility]    SURGICAL HISTORY OF -   1996    malignant melanoma    SURGICAL HISTORY OF -   1968    thyroid nodule    SURGICAL HISTORY OF -       D & C    ZZC NONSPECIFIC PROCEDURE  2005    colonoscopy polyp repeat 2010     SOCIAL HISTORY:   Social History     Tobacco Use    Smoking status: Never     Passive exposure: Never    Smokeless tobacco: Never   Vaping Use    Vaping status: Never Used   Substance Use Topics    Alcohol use: No     Alcohol/week: 0.0 standard drinks of alcohol     Comment: rare    Drug use: No     FAMILY HISTORY:   Family History   Problem Relation Age of Onset    Cancer Father         dec - esophageal and laryngeal    Heart Disease Mother     Respiratory Mother         dec    Breast Cancer Daughter     Other Cancer Daughter     Thyroid Disease Daughter     Asthma Daughter     Hyperlipidemia Son     Diabetes Son     Anesthesia Reaction No family hx of     Deep Vein Thrombosis (DVT) No family hx of      ALLERGIES:   No Known Allergies  CURRENT MEDICATIONS:   Current Outpatient Medications:     alendronate (FOSAMAX) 70 MG tablet, TAKE 1 TABLET EVERY 7 DAYS AT LEAST 60 MINUTES BEFORE BREAKFAST  AS DIRECTED., Disp: 12 tablet, Rfl: 3    diltiazem ER (DILT-XR) 180 MG 24 hr capsule, TAKE 1 CAPSULE EVERY DAY, Disp: 90 capsule, Rfl: 3    empagliflozin (JARDIANCE) 10 MG TABS tablet, Take 10 mg by mouth daily., Disp: , Rfl:     furosemide (LASIX) 20 MG tablet, TAKE 1 TABLET TWICE DAILY IN THE MORNING AND AFTER LUNCH, Disp: 180 tablet, Rfl: 3    irbesartan (AVAPRO) 300 MG tablet, TAKE 1 TABLET EVERY DAY, Disp: 90 tablet, Rfl: 3    lovastatin (MEVACOR) 40 MG tablet, TAKE 1 TABLET AT BEDTIME (SCHEDULE ANNUAL VISIT, NEED FASTING LABS), Disp: 90 tablet, Rfl: 3    methimazole (TAPAZOLE) 5 MG tablet, TAKE 1 TABLET ALTERNATING WITH 1/2 TABLET EVERY OTHER DAY, Disp: 66 tablet, Rfl: 3    Omega-3 Fatty Acids (FISH OIL) 500 MG CAPS, Take 1 capsule by mouth every morning , Disp: , Rfl:     omeprazole (PRILOSEC) 20 MG DR capsule, TAKE 1 CAPSULE EVERY DAY, Disp: 90 capsule, Rfl: 3    propranolol ER (INDERAL LA) 60 MG 24 hr capsule, TAKE 1 CAPSULE EVERY DAY, Disp: 90 capsule, Rfl: 0    RESTASIS 0.05 % ophthalmic emulsion, , Disp: , Rfl:     rOPINIRole (REQUIP) 0.25 MG tablet, TAKE 1 TABLET IN THE AFTERNOON AND 2 TABLETS AT NIGHT, Disp: 270 tablet, Rfl: 0    sertraline (ZOLOFT) 100 MG tablet, TAKE 1 TABLET EVERY MORNING, Disp: 90 tablet, Rfl: 0    traZODone (DESYREL) 50 MG tablet, TAKE 1/2 TABLET AT BEDTIME, Disp: 45 tablet, Rfl: 0    XARELTO ANTICOAGULANT 15 MG TABS tablet, TAKE 1 TABLET (15 MG) BY MOUTH DAILY (WITH DINNER), Disp: 90 tablet, Rfl: 3    PHYSICAL EXAMINATION:  General: No acute distress  HEENT: Sclera anicteric. Oral mucosa pink and moist.  No mucositis or thrush  Lymph: No lymphadenopathy in neck  Heart: Regular, rate, and rhythm  Lungs: Clear to ascultation bilaterally  Abdomen: Positive bowel sounds. Soft, non-distended, non-tender. No organomegaly or mass.   Extremities: no lower extremity edema  Neuro: Cranial nerves grossly intact  Rash: none  Vascular access: port    LABORATORY DATA:   Recent Labs   Lab Test  04/23/25  1003 04/23/25  0932 09/13/24  1150 09/13/24  1121 03/15/24  1143 12/18/23  1548 12/07/23  1613 11/14/23  0022   *  --  135  --   --  139 138 142   POTASSIUM 4.2  --  5.0  --   --  4.6 4.7 4.1   CHLORIDE 98  --  99  --   --  99 100 103   CO2 27  --  23  --   --  29 29 26   ANIONGAP 9  --  13  --   --  11 9 13   BUN 22.0  --  20.2  --   --  19.7 16.8 13.9   CR 1.04* 1.2* 1.01*  1.01* 1.1* 1.1* 1.04* 0.93 0.87   GLC 98  --  81  --   --  101* 103* 104*   SHREYA 9.2  --  9.2  --   --  9.4 9.4 9.1     Recent Labs   Lab Test 09/02/21  0644 09/01/21  1505 02/03/19  2102 12/12/18  1050 01/16/18  1247 01/16/18  0646 12/13/17  2236   MAG 2.4* 2.3 1.8 2.1 2.1   < > 2.0   PHOS  --   --   --   --   --   --  2.4*    < > = values in this interval not displayed.     Recent Labs   Lab Test 06/03/25  0956 04/23/25  1003 03/15/24  1232 12/18/23  1548 12/07/23  1613 11/14/23  0022 11/13/23  1952 11/08/23  1431 07/18/23  1158 02/27/23  1306   WBC 8.6 8.0 10.0 7.6 8.4 8.0 7.5   < > 6.4 8.4   HGB 11.6* 11.3* 12.9 12.7 12.8 12.3 13.0   < > 12.9 12.8    212 263 214 220 191 221   < > 209 192   MCV 86 83 93 98 96 98 98   < > 97 97   NEUTROPHIL  --  73  --   --   --  68 70  --  71 73    < > = values in this interval not displayed.     Recent Labs   Lab Test 04/23/25  1003 09/13/24  1150 11/14/23  0022 11/13/23  1952 11/08/23  1431 12/09/21  2356 11/19/21  0911 08/11/21  1031 09/11/18  0612 07/17/18  1346   BILITOTAL 0.5 0.6 0.5   < > 0.5   < > 0.5 0.4   < >  --    ALKPHOS 93 98 86  --  89   < > 98 88   < >  --    ALT 18 31 21  --  17   < > 23 17   < >  --    AST 26 41  --   --  22   < > 15 14   < >  --    ALBUMIN 3.8 4.1 4.1   < > 4.2   < > 3.4 3.5   < >  --    LDH  --   --   --   --   --   --  180 176  --  204    < > = values in this interval not displayed.     TSH   Date Value Ref Range Status   12/13/2024 3.12 0.30 - 4.20 uIU/mL Final   05/28/2024 3.17 0.30 - 4.20 uIU/mL Final   06/22/2023 2.39 0.30 - 4.20 uIU/mL Final  "  05/20/2022 2.90 0.40 - 4.00 mU/L Final   09/02/2021 1.24 0.40 - 4.00 mU/L Final   07/05/2021 1.68 0.40 - 4.00 mU/L Final   05/27/2021 1.93 0.40 - 4.00 mU/L Final   01/27/2021 2.42 0.40 - 4.00 mU/L Final        Collected 6/3/2025 11:33 AM       Status: Final result    Test Result Released: Yes (seen)    0 Result Notes       Component  Ref Range & Units (hover)  Resulting Agency   Case Report   Surgical Pathology Report                         Case: [id number]                                   Authorizing Provider:  Sd Fine MD    Collected:           06/03/2025 11:33 AM           Ordering Location:     [facility]     Received:            06/03/2025 11:58 AM                                  [address] [place]                                                             Pathologist:           Elenita Hernandez MD                                                     Specimen:    Lung, Left                                                                                Final Diagnosis   A. \"Lung\", left lower lobe lesion, core needle biopsy:                -Large B cell lymphoma, non-germinal center subtype                 -No evidence of carcinoma or melanoma                 -See comment    Electronically signed by Elenita Hernandez MD on 6/8/2025 at 0906 CDT   Comment  [facility]   The findings are diagnostic of B-cell lymphoma. The neoplastic cells are large and form aggregates/sheets, most consistent with a large B-cell lymphoma. By immunohistochemistry, a few tiny remnants of follicular dendritic cell meshworks are present - despite this, there is no convincing low grade/small cell component present, although the biopsy is small and thus sampling is limited.     The lymphoma cells are non-germinal center B-cell type according to immunohistochemistry and the Pradeep algorithm. The differential diagnosis primarily includes diffuse large B-cell lymphoma (DLBCL) and diffuse/high grade B-cell " lymphoma with MYC and BCL2 rearrangements or MYC and BCL6 rearrangements (double hit lymphomas). FISH for these gene rearrangements is in process. The results will be reported separately and integrated into an addendum to this report.     Note - A concurrent flow cytometry study was not performed.      This case was reviewed in part at our Hematopathology Faculty Consensus conference at which I was present along with Dr. Coker, Dr. Dailey, Dr. Burnett, and Dr. Patino.   Clinical Information              I reviewed the above labs and imaging today.      ASSESSMENT AND PLAN: Ms. 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      Dimple continues to remain in good health. She has no new reported symptoms in history.  She had growth in her pleural based lung lesion and we did biopsy this. 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 This is very different from urothelial cancer that had affected her right renal pelvis or urinary bladder. I explained them cancers of lymphocytes - which are subset of white cells as B and T types. The cancer of lymphocytes are lymphomas - Hodgkins and Non-Hodgkins types. She has the common Non-Hodgkins variant. This has numerous subtypes. She has higher grade disease with diffuse large B cell lymphoma. However on the flip side, high grade lymphomas are curable with treatment.     I reviewed management in a younger patient would involve -   PET-CT  Bone marrow biopsy  Labs for hepatitis serology  Echocardiogram (prior to planned adriamycin)  Port   Followed by chemotherapy with R-CHOP regimen and then consolidation radiation to limited site of disease.     I briefly reviewed the agents and chemotherapy with immunotherapy. We spoke about radiation therapy. Next steps  will be dependent on PET findings. DLBCL is considered rapidly progressive and fatal without treatment but oddly we have monitored this very lesion for     In her case she is not a good candidate for chemotherapy. She would still be a candidate for rituximab, radiation. I would refer her to my colleagues in hematology who specialize in NHL / DLBCL / lymphomas. She will need PET-CT and it will help decision making. I will try to get this done ASAP.      I reviewed the bone marrow biopsy. I will hold on bone marrow biopsy and will defer definitive management to lymphoma experts.    1. Upper tract urothelial cancer:   - She completed 4 cycles of adjuvant carboplatin plus gemcitabine chemotherapy per POUT trial.    She completed adjuvant chemotherapy in Feb 2019.  - No residual side effects or evidence of recurrence.  - Reviewed restaging CT scan9/13/24 as above; there is no clear evidence of disease recurrence in chest/abd/pelvis.   She continues to have multiple pleural based pulmonary nodules which remain stable        2. High grade urothelial carcinoma in situ:  - Bx proven carcinoma in situ in 1/2020, completed the first round of intravesical BCG treatment 2/2020-3/2020 and second in 11/2020-12/2020.  - She was initially followed by Dr. King and now is under care of Dr. Henry.   - She did have urine cytology and is negative; FISH by U[facility] is pending    4. HERMAN:  - She follows Dr. Griffith in Cardiology for her h/o moderate AORTIC STENOSIS, CHF, and Afib with SOA and lymphedema with no cancer-related etiology evident.   - She has new anemia. Her MCV has dropped from 93 to 83 Fl. I am worried this could be iron deficiency. Her PCP did check ferritin which was adequate. I will get additional iron, TIBC, percent saturation, soluble transferrin receptor, B12 and folate checked.     Return to clinic after biopsy of pleural based lung nodule to see me.     The longitudinal plan of care for the  diagnosis(es)/condition(s) as documented were addressed during this visit. Due to the added complexity in care, I will continue to support Dimple in the subsequent management and with ongoing continuity of care.     45 minutes spent on the date of the encounter doing chart review, history and exam, documentation and further activities as noted above

## 2025-06-10 NOTE — LETTER
6/10/2025      Dimple Oviedo  [address]  [facility] [address]      Dear Colleague,    Thank you for referring your patient, Dimple Oviedo, to the [facility]. Please see a copy of my visit note below.      MEDICAL ONCOLOGY RETURN VISIT NOTE  Yaron 10, 2025    REFERRING PROVIDER: Stuart King MD    REASON FOR CURRENT VISIT: Evaluation while on surveillance after adjuvant chemotherapy for high-grade transitional cell carcinoma of right renal pelvis.    HISTORY OF PRESENT ILLNESS:  Ms. 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 Her oncologic history is as under.    Ms. Oviedo is doing well.  She has no new pains in her body. No hematuria. No fevers or chills. No chest pain or shortness of breath.     She is accompanied by her family and is here to review results of her biopsy.      ONCOLOGIC HISTORY:  1. 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   - Jan 2018 to September 2018- Persistent gross hematuria and underwent multiple procedures.    - 2/19/18 and 4/3/18- cystoscopies with bladder biopsies  which were negative for bladder tumor  - 1/17/18, 3/8/18, 7/26/18, 9/10/18- Multiple urine cytologies have come back positive by FISH for high-grade transitional cell carcinoma  - 9/10/18- Underwent a bilateral cystoureteroscopy with biopsy and brushing that showed  right upper tract cytology suspicious for high-grade urothelial ca. Right ureter brushing negative from that day. Left upper tract negative.  - 9/10/18- CT A/P without contrast showed new small bilateral pleural effusions and interstitial pulmonary edema but no evidence of locoregional or metastatic disease.  - 9/12/18- Presented to ED with oliguria, CRESENCIO, and mild hydronephrosis bilaterally on CT scan and underwent  "bilateral ureteral stent placment  - 11/13/2018- Right robotic nephroureterectomy, excision of sandip-caval mass and LN excision by Stuart King. Pathology showed \"Right nephroureterectomy: Invasive high grade urothelial carcinoma measuring 3.5 cm, located in renal pelvis and invading through renal parenchyma into perinephric fat. Urothelial carcinoma in-situ present. Margins free of tumor. Sandip-caval mass: Metastatic urothelial carcinoma involving one lymph node with at least 1 cm tumor deposit. Pericaval Extranodal Extension present. Five additional benign pericaval lymph nodes. Pathologic stage: pT4N1 (1 of 6 lymph nodes).\"  - 12/11/18- PET/CT whole body demonstrated significant residual FDG avid disease. For example, \"there is an FDG avid ill-defined pericaval mass immediately medial to the surgical clips measuring approximately 2.0 x 1.4 cm, with max SUV measuring up to12.2, see series 4 image 21. Additional FDG avid retrocaval and interaortocaval lymphadenopathy are noted.There is a 1.2 cm nodule in the right hemipelvis posterior lateral tothe bladder with max SUV measuring 8.3, see series 4 image 77. The bladder is incompletely distended.\" No suspicious thoracic or bone uptake.  - 12/12/18- Started adjuvant chemotherapy (carbo+gem) per POUT trial. Cycle 2 - 1/2/19. Cycle 3 - 1/23/19. Cycle 4 - 02/13/19.  - 1/22/19 - CT C/A/P with contrast compared with prior PET/CT: \"1. New well-circumscribed soft tissue pulmonary nodules of the right lower lobe and left upper lobe. Findings could be infectious, inflammatory, or represent metastatic disease. Continued attention on follow-up recommended. Postoperative changes of right nephrectomy. No evidence for local recurrence in the present study.\"  - 3/1/2019- CT C/A/P with contrast - \"1. Bilateral pulmonary nodules measuring up to 4 mm in the right lower lobe, previously measuring 8 mm. No new or enlarging pulmonary nodules. 2. No convincing evidence for metastatic " "disease in the abdomen, pelvis and bones.\"  - 6/3/2019- CT C/A/P with contrast - \"1. Surgical changes of right nephrectomy without evidence of residual or recurrent disease on this noncontrast exam.  Consider contrast enhanced examination on follow-up. 2. No evidence of metastatic disease in the abdomen, or pelvis on noncontrast CT.  Scattered tiny pulmonary nodules in the chest are not significantly changed.  Previously described prominent right lower lobe pulmonary nodule (previously measuring up to 8 mm) is no longer visualized. 3. Stable bilateral pulmonary nodules measuring maximally 4 mm.\"  - 09/11/19: CT C/A/P without contrast - \"1. Stable exam without evidence convincing for new disease. 2. Stable pulmonary nodules. 3. 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 4. Stable heterogeneous enlargement of the thyroid with underlying nodules suggested.\"  - 12/4/19: CT C/A/P without contrast - \"No acute change since prior exam. No evidence of recurrent or metastatic disease. Tiny lung nodules are stable. Prior right nephrectomy. Left renal artery aneurysm is stable.\"  - 04/08/20, 7/27/20: CT C/A/P with contrast - GABRIELLE.  - 01/12/21: CT C/A/P with contrast - \"1.  Slight increased size of a 6 mm left upper lobe nodule and new 4 mm linear opacity along the right major fissure. These are indeterminate but could represent progression of metastatic disease. 2.  Slight increased size of mildly enlarged mediastinal and hilar lymph nodes. 3.  Mild pulmonary edema. 4.  No recurrent or metastatic disease in the abdomen and pelvis. 5.  Mild stranding around the urinary bladder dome may be related to cystitis. Punctate focus of gas within the urinary bladder either secondary to infection or instrumentation. 6.  Enlarged multinodular thyroid gland.\"    2. Non-muscle invasive bladder cancer:  - 10/3/19: Cystoscopy showed a small area of erythema on retroflexion, possibly pre-existing or due to retroflexion of the " scope. Urine cytology from that time showed cells suspicious for malignancy.   - 1/13/20: Bladder biopsy showed high grade urothelial carcinoma in-situ  - 2/12/20-3/18/20: Intravesical BCG therapy  - 7/24/20 and last cystoscopy was on the same day. It was negative. However, urine cytology was positive for high-grade urothelial carcinoma.   - 11/25/20-12/10/2020: 3 weekly doses of intravesical BCG 50mg.   - 12/10/20: Cytology suspicious and FISH urovysion positive for TCC.    REVIEW OF SYSTEMS: 14 point ROS negative other than the symptoms noted above in the HPI.    PAST MEDICAL AND SURGICAL HISTORY:   Past Medical History:   Diagnosis Date     CRESENCIO (acute kidney injury) 9/11/2018     Arthritis      Asthma 2/9/2022     Calculus of kidney      Chemotherapy-induced neutropenia 3/6/2019     Congestive heart failure (H)      Esophageal reflux      GERD (gastroesophageal reflux disease)      Hyperlipidemia LDL goal <130 5/9/2010     Malignant melanoma of skin of trunk, except scrotum (H)      Nonspecific abnormal finding     has living will 2004 -      Nontoxic multinodular goiter     no further eval /tx rec per pt     Osteopenia      Other psoriasis      Personal history of colonic polyps      PMR (polymyalgia rheumatica)      Restless legs syndrome 10/12/2005     Stress-induced cardiomyopathy      Undiagnosed cardiac murmurs      Unspecified constipation      Unspecified essential hypertension      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     Past Surgical History:   Procedure Laterality Date     ARTHROPLASTY KNEE Right 11/9/2020    Procedure: ARTHROPLASTY, KNEE, TOTAL, RIGHT;  Surgeon: Edward Otero MD;  [facility]     BIOPSY       COLONOSCOPY  2014     COMBINED CYSTOSCOPY, INSERT STENT URETER(S) Bilateral 9/12/2018    Procedure: COMBINED CYSTOSCOPY, INSERT STENT URETER(S);  Cystoscopy Bilateral ureteral Stent Placement.;  Surgeon: Justin Henry MD;  [facility]     COMBINED CYSTOSCOPY, RETROGRADES,  URETEROSCOPY, INSERT STENT Bilateral 4/3/2018    Procedure: COMBINED CYSTOSCOPY, RETROGRADES, URETEROSCOPY, INSERT STENT;;  Surgeon: Stuart King MD;  [facility]     COMBINED CYSTOSCOPY, RETROGRADES, URETEROSCOPY, INSERT STENT Bilateral 9/10/2018    Procedure: COMBINED CYSTOSCOPY, RETROGRADES, URETEROSCOPY, INSERT STENT;  Cystoscopy, Bilateral Ureteroscopy, Bladder Biopsies, Retrogram Pyelograms, Ureteral Washings and brushings, cysview;  Surgeon: Stuart King MD;  [facility]     COMBINED CYSTOSCOPY, RETROGRADES, URETEROSCOPY, INSERT STENT Left 8/26/2020    Procedure: Cystoscopy, Left Ureteral Wash, Left ureteral Retrograde Pyelogram;  Surgeon: Justin Henry MD;  [facility]     CYSTOSCOPY, BIOPSY BLADDER INSTILL OPTICAL AGENT N/A 4/3/2018    Procedure: CYSTOSCOPY, BIOPSY BLADDER INSTILL OPTICAL AGENT;  Cystoscopy, Blue Light Cystoscopy, Bladder Biopsies, Bilateral Selective ureteral washings for Cytology, Bilateral Retrograde Pyelograms, Bilateral Ureteroscopy;  Surgeon: Stuart King MD;  [facility]     CYSTOSCOPY, BIOPSY BLADDER, COMBINED N/A 2/19/2018    Procedure: COMBINED CYSTOSCOPY, BIOPSY BLADDER;  Cystoscopy, Bladder Biopsy;  Surgeon: Kenna La MD;  [facility]     CYSTOSCOPY, BIOPSY BLADDER, COMBINED N/A 8/26/2020    Procedure: Cystoscopy, biopsy bladder, combined;  Surgeon: Justin Henry MD;  [facility]     CYSTOSCOPY, FULGURATE BLADDER TUMOR, COMBINED N/A 1/13/2020    Procedure: CYSTOSCOPY, WITH  bladder biopsies and fulguration;  Surgeon: Stuart King MD;  [facility]     CYSTOSCOPY, REMOVE STENT(S), COMBINED  11/13/2018    Procedure: Flexible Cystoscopy with Stent Removal;  Surgeon: Stuart King MD;  [facility]     DAVINCI LYMPHADENECTOMY N/A 11/13/2018    Procedure: Davinci Lymphadenectomy ;  Surgeon: Stuart King MD;  [facility]     DAVINCI  NEPHROURETERECTOMY N/A 11/13/2018    Procedure: Right DaVinci Assisted Nephroureterectomy;  Surgeon: Stuart King MD;  [facility]     ENDOSCOPIC ULTRASOUND LOWER GASTROINTESTIONAL TRACT (GI) N/A 10/30/2015    Procedure: ENDOSCOPIC ULTRASOUND LOWER GASTROINTESTIONAL TRACT (GI);  Surgeon: Daniel Jean-Baptiste MD;  [facility]     ESOPHAGOSCOPY, GASTROSCOPY, DUODENOSCOPY (EGD), COMBINED N/A 11/29/2023    Procedure: Esophagoscopy, gastroscopy, duodenoscopy (EGD), combined;  Surgeon: Justin Peñaloza MD;  [facility]     EYE SURGERY  12/4/17     INSERT PORT VASCULAR ACCESS Right 12/19/2018    Procedure: Chest Port Placement - right;  Surgeon: Stuart Chavez PA-C;  [facility]     IR CHEST PORT PLACEMENT > 5 YRS OF AGE  12/19/2018     IR PORT REMOVAL RIGHT  6/26/2019     LAPAROSCOPIC CHOLECYSTECTOMY WITH CHOLANGIOGRAMS N/A 11/1/2015    Procedure: LAPAROSCOPIC CHOLECYSTECTOMY WITH CHOLANGIOGRAMS;  Surgeon: Tonie Warren MD;  [facility]     REMOVE PORT VASCULAR ACCESS Right 6/26/2019    Procedure: Right Port Removal;  Surgeon: Froilan Irizarry PA-C;  [facility]     SURGICAL HISTORY OF -   1996    malignant melanoma     SURGICAL HISTORY OF -   1968    thyroid nodule     SURGICAL HISTORY OF -       D & C     Z NONSPECIFIC PROCEDURE  2005    colonoscopy polyp repeat 2010     SOCIAL HISTORY:   Social History     Tobacco Use     Smoking status: Never     Passive exposure: Never     Smokeless tobacco: Never   Vaping Use     Vaping status: Never Used   Substance Use Topics     Alcohol use: No     Alcohol/week: 0.0 standard drinks of alcohol     Comment: rare     Drug use: No     FAMILY HISTORY:   Family History   Problem Relation Age of Onset     Cancer Father         dec - esophageal and laryngeal     Heart Disease Mother      Respiratory Mother         dec     Breast Cancer Daughter      Other Cancer Daughter      Thyroid Disease Daughter      Asthma Daughter       Hyperlipidemia Son      Diabetes Son      Anesthesia Reaction No family hx of      Deep Vein Thrombosis (DVT) No family hx of      ALLERGIES:   No Known Allergies  CURRENT MEDICATIONS:   Current Outpatient Medications:      alendronate (FOSAMAX) 70 MG tablet, TAKE 1 TABLET EVERY 7 DAYS AT LEAST 60 MINUTES BEFORE BREAKFAST AS DIRECTED., Disp: 12 tablet, Rfl: 3     diltiazem ER (DILT-XR) 180 MG 24 hr capsule, TAKE 1 CAPSULE EVERY DAY, Disp: 90 capsule, Rfl: 3     empagliflozin (JARDIANCE) 10 MG TABS tablet, Take 10 mg by mouth daily., Disp: , Rfl:      furosemide (LASIX) 20 MG tablet, TAKE 1 TABLET TWICE DAILY IN THE MORNING AND AFTER LUNCH, Disp: 180 tablet, Rfl: 3     irbesartan (AVAPRO) 300 MG tablet, TAKE 1 TABLET EVERY DAY, Disp: 90 tablet, Rfl: 3     lovastatin (MEVACOR) 40 MG tablet, TAKE 1 TABLET AT BEDTIME (SCHEDULE ANNUAL VISIT, NEED FASTING LABS), Disp: 90 tablet, Rfl: 3     methimazole (TAPAZOLE) 5 MG tablet, TAKE 1 TABLET ALTERNATING WITH 1/2 TABLET EVERY OTHER DAY, Disp: 66 tablet, Rfl: 3     Omega-3 Fatty Acids (FISH OIL) 500 MG CAPS, Take 1 capsule by mouth every morning , Disp: , Rfl:      omeprazole (PRILOSEC) 20 MG DR capsule, TAKE 1 CAPSULE EVERY DAY, Disp: 90 capsule, Rfl: 3     propranolol ER (INDERAL LA) 60 MG 24 hr capsule, TAKE 1 CAPSULE EVERY DAY, Disp: 90 capsule, Rfl: 0     RESTASIS 0.05 % ophthalmic emulsion, , Disp: , Rfl:      rOPINIRole (REQUIP) 0.25 MG tablet, TAKE 1 TABLET IN THE AFTERNOON AND 2 TABLETS AT NIGHT, Disp: 270 tablet, Rfl: 0     sertraline (ZOLOFT) 100 MG tablet, TAKE 1 TABLET EVERY MORNING, Disp: 90 tablet, Rfl: 0     traZODone (DESYREL) 50 MG tablet, TAKE 1/2 TABLET AT BEDTIME, Disp: 45 tablet, Rfl: 0     XARELTO ANTICOAGULANT 15 MG TABS tablet, TAKE 1 TABLET (15 MG) BY MOUTH DAILY (WITH DINNER), Disp: 90 tablet, Rfl: 3    PHYSICAL EXAMINATION:  General: No acute distress  HEENT: Sclera anicteric. Oral mucosa pink and moist.  No mucositis or thrush  Lymph: No  lymphadenopathy in neck  Heart: Regular, rate, and rhythm  Lungs: Clear to ascultation bilaterally  Abdomen: Positive bowel sounds. Soft, non-distended, non-tender. No organomegaly or mass.   Extremities: no lower extremity edema  Neuro: Cranial nerves grossly intact  Rash: none  Vascular access: port    LABORATORY DATA:   Recent Labs   Lab Test 04/23/25  1003 04/23/25  0932 09/13/24  1150 09/13/24  1121 03/15/24  1143 12/18/23  1548 12/07/23  1613 11/14/23  0022   *  --  135  --   --  139 138 142   POTASSIUM 4.2  --  5.0  --   --  4.6 4.7 4.1   CHLORIDE 98  --  99  --   --  99 100 103   CO2 27  --  23  --   --  29 29 26   ANIONGAP 9  --  13  --   --  11 9 13   BUN 22.0  --  20.2  --   --  19.7 16.8 13.9   CR 1.04* 1.2* 1.01*  1.01* 1.1* 1.1* 1.04* 0.93 0.87   GLC 98  --  81  --   --  101* 103* 104*   SHREYA 9.2  --  9.2  --   --  9.4 9.4 9.1     Recent Labs   Lab Test 09/02/21  0644 09/01/21  1505 02/03/19  2102 12/12/18  1050 01/16/18  1247 01/16/18  0646 12/13/17  2236   MAG 2.4* 2.3 1.8 2.1 2.1   < > 2.0   PHOS  --   --   --   --   --   --  2.4*    < > = values in this interval not displayed.     Recent Labs   Lab Test 06/03/25  0956 04/23/25  1003 03/15/24  1232 12/18/23  1548 12/07/23  1613 11/14/23  0022 11/13/23  1952 11/08/23  1431 07/18/23  1158 02/27/23  1306   WBC 8.6 8.0 10.0 7.6 8.4 8.0 7.5   < > 6.4 8.4   HGB 11.6* 11.3* 12.9 12.7 12.8 12.3 13.0   < > 12.9 12.8    212 263 214 220 191 221   < > 209 192   MCV 86 83 93 98 96 98 98   < > 97 97   NEUTROPHIL  --  73  --   --   --  68 70  --  71 73    < > = values in this interval not displayed.     Recent Labs   Lab Test 04/23/25  1003 09/13/24  1150 11/14/23  0022 11/13/23  1952 11/08/23  1431 12/09/21  2356 11/19/21  0911 08/11/21  1031 09/11/18  0612 07/17/18  1346   BILITOTAL 0.5 0.6 0.5   < > 0.5   < > 0.5 0.4   < >  --    ALKPHOS 93 98 86  --  89   < > 98 88   < >  --    ALT 18 31 21  --  17   < > 23 17   < >  --    AST 26 41  --   --  22    "< > 15 14   < >  --    ALBUMIN 3.8 4.1 4.1   < > 4.2   < > 3.4 3.5   < >  --    LDH  --   --   --   --   --   --  180 176  --  204    < > = values in this interval not displayed.     TSH   Date Value Ref Range Status   12/13/2024 3.12 0.30 - 4.20 uIU/mL Final   05/28/2024 3.17 0.30 - 4.20 uIU/mL Final   06/22/2023 2.39 0.30 - 4.20 uIU/mL Final   05/20/2022 2.90 0.40 - 4.00 mU/L Final   09/02/2021 1.24 0.40 - 4.00 mU/L Final   07/05/2021 1.68 0.40 - 4.00 mU/L Final   05/27/2021 1.93 0.40 - 4.00 mU/L Final   01/27/2021 2.42 0.40 - 4.00 mU/L Final        Collected 6/3/2025 11:33 AM       Status: Final result    Test Result Released: Yes (seen)    0 Result Notes       Component  Ref Range & Units (hover)  Resulting Agency   Case Report   Surgical Pathology Report                         Case: [id number]                                   Authorizing Provider:  Sd Fine MD    Collected:           06/03/2025 11:33 AM           Ordering Location:     [facility]     Received:            06/03/2025 11:58 AM                                  [address] [place]                                                             Pathologist:           Elenita Hernandez MD                                                     Specimen:    Lung, Left                                                                                Final Diagnosis   A. \"Lung\", left lower lobe lesion, core needle biopsy:                -Large B cell lymphoma, non-germinal center subtype                 -No evidence of carcinoma or melanoma                 -See comment    Electronically signed by Elenita Hernandez MD on 6/8/2025 at 0906 CDT   Comment  [facility]   The findings are diagnostic of B-cell lymphoma. The neoplastic cells are large and form aggregates/sheets, most consistent with a large B-cell lymphoma. By immunohistochemistry, a few tiny remnants of follicular dendritic cell meshworks are present - despite this, there is no " convincing low grade/small cell component present, although the biopsy is small and thus sampling is limited.     The lymphoma cells are non-germinal center B-cell type according to immunohistochemistry and the Pradeep algorithm. The differential diagnosis primarily includes diffuse large B-cell lymphoma (DLBCL) and diffuse/high grade B-cell lymphoma with MYC and BCL2 rearrangements or MYC and BCL6 rearrangements (double hit lymphomas). FISH for these gene rearrangements is in process. The results will be reported separately and integrated into an addendum to this report.     Note - A concurrent flow cytometry study was not performed.      This case was reviewed in part at our Hematopathology Faculty Consensus conference at which I was present along with Dr. Coker, Dr. Dailey, Dr. Burnett, and Dr. Patino.   Clinical Information              I reviewed the above labs and imaging today.      ASSESSMENT AND PLAN: Ms. Oviedo is a delightful 89 year old lady with localized stage IV ([id number]) high-grade, muscle-invasive transitional cell carcinoma of right renal pelvis, status post right robotic nephroureterectomy and 4 cycles of adjuvant carbo/gem; as well as NMIBC.      Dimple continues to remain in good health. She has no new reported symptoms in history.  She had growth in her pleural based lung lesion and we did biopsy this. 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 This is very different from urothelial cancer that had affected her right renal pelvis or urinary bladder. I explained them cancers of lymphocytes - which are subset of white cells as B and T types. The cancer of lymphocytes are lymphomas - Hodgkins and Non-Hodgkins types. She has the common Non-Hodgkins variant. This has numerous subtypes. She has higher grade disease with diffuse large B cell lymphoma. However on the flip side, high grade lymphomas are curable with treatment.     I reviewed management in a younger patient would  involve -   PET-CT  Bone marrow biopsy  Labs for hepatitis serology  Echocardiogram (prior to planned adriamycin)  Port   Followed by chemotherapy with R-CHOP regimen and then consolidation radiation to limited site of disease.     I briefly reviewed the agents and chemotherapy with immunotherapy. We spoke about radiation therapy. Next steps will be dependent on PET findings. DLBCL is considered rapidly progressive and fatal without treatment but oddly we have monitored this very lesion for     In her case she is not a good candidate for chemotherapy. She would still be a candidate for rituximab, radiation. I would refer her to my colleagues in hematology who specialize in NHL / DLBCL / lymphomas. She will need PET-CT and it will help decision making. I will try to get this done ASAP.      I reviewed the bone marrow biopsy. I will hold on bone marrow biopsy and will defer definitive management to lymphoma experts.    1. Upper tract urothelial cancer:   - She completed 4 cycles of adjuvant carboplatin plus gemcitabine chemotherapy per POUT trial.    She completed adjuvant chemotherapy in Feb 2019.  - No residual side effects or evidence of recurrence.  - Reviewed restaging CT scan9/13/24 as above; there is no clear evidence of disease recurrence in chest/abd/pelvis.   She continues to have multiple pleural based pulmonary nodules which remain stable        2. High grade urothelial carcinoma in situ:  - Bx proven carcinoma in situ in 1/2020, completed the first round of intravesical BCG treatment 2/2020-3/2020 and second in 11/2020-12/2020.  - She was initially followed by Dr. King and now is under care of Dr. Henry.   - She did have urine cytology and is negative; FISH by Urov[facility] is pending    4. HERMAN:  - She follows Dr. Griffith in Cardiology for her h/o moderate AORTIC STENOSIS, CHF, and Afib with SOA and lymphedema with no cancer-related etiology evident.   - She has new anemia. Her MCV has dropped from 93 to  83 Fl. I am worried this could be iron deficiency. Her PCP did check ferritin which was adequate. I will get additional iron, TIBC, percent saturation, soluble transferrin receptor, B12 and folate checked.     Return to clinic after biopsy of pleural based lung nodule to see me.     The longitudinal plan of care for the diagnosis(es)/condition(s) as documented were addressed during this visit. Due to the added complexity in care, I will continue to support Dimple in the subsequent management and with ongoing continuity of care.     45 minutes spent on the date of the encounter doing chart review, history and exam, documentation and further activities as noted above       Again, thank you for allowing me to participate in the care of your patient.        Sincerely,        Sd Fine MD    Electronically signed

## 2025-06-10 NOTE — PROGRESS NOTES
Chief complaint  Concerns regarding biopsy results indicating diffuse large B-cell lymphoma, an aggressive form of non-Hodgkin's lymphoma.    History of present illness  - Concerns about biopsy results indicating diffuse large B-cell lymphoma, an aggressive non-Hodgkin's lymphoma.  - Previous treatment with immunotherapy for bladder and kidney issues, responded well.  - Lymphoma located along the lining of the lung, small size, approximately the size of two peanuts.  - Growth observed over the past year, prompting biopsy.  - No surgical approach recommended for lymphoma.  - Previous removal of kidney and ureter related to past medical issues.    Past medical history  - Diffuse large B-cell lymphoma  - History of bladder and kidney issues    Past surgical history  - Kidney and ureter removal    Allergies  - Allergic reactions to Rituximab (implied)    Current medications  - Rituximab, previously administered for bladder and kidney treatment    Imaging results  - Biopsy result: Diffuse large B-cell lymphoma, high-grade non-Hodgkin's lymphoma  - Location: Lining of the lung, small size (approximately the size of two peanuts)    Assessment  - Diffuse large B-cell lymphoma, an aggressive form of non-Hodgkin's lymphoma, confirmed by biopsy.  - High-grade lymphoma, which progresses rapidly if untreated.  - Limited disease, currently identified as a small spot along the lining of the lung.    Plan  - Initiate chemoimmunotherapy with Rituximab, considering its effectiveness and tolerability. This will be part of a modified treatment regimen due to the patient's age and condition.  - Proceed with a PET CT scan to determine the extent of the disease, as high-grade lymphomas are metabolically active and may reveal more disease than a regular CT scan.  - Consider a bone marrow biopsy to check for bone marrow involvement, but defer the decision to the lymphoma specialist who will assess the necessity based on the patient's age  and condition.  - Refer the patient to a lymphoma specialist for further management and treatment planning, as they have more experience with similar cases.  - Conduct additional laboratory tests to rule out previous hepatitis infections, as Rituximab can reactivate hepatitis B. These tests will be done through serologic assays.  - Coordinate the scheduling of the PET scan and blood tests on the same day to facilitate ease of transportation and reduce patient burden.    Prescription  - Rituximab; risk: allergic reactions noted    Appointments  - PET scan appointment, to be scheduled.  - Appointment with a lymphoma specialist in this clinic, to be scheduled.      Visit diagnoses suggestions (1)  - Intraspinal abscess and granuloma [G06.1] rolling walker

## 2025-06-10 NOTE — NURSING NOTE
"Oncology Rooming Note    Caryn 10, 2025 4:03 PM   Dimple Oviedo is a 89 year old female who presents for:    Chief Complaint   Patient presents with    Oncology Clinic Visit     Urothelial Ca     Initial Vitals: /64   Pulse 78   Temp 98.1  F (36.7  C) (Oral)   Resp 18   Wt 79.4 kg (175 lb)   SpO2 93%   BMI 37.87 kg/m   Estimated body mass index is 37.87 kg/m  as calculated from the following:    Height as of 6/3/25: 1.448 m (4' 9\").    Weight as of this encounter: 79.4 kg (175 lb). Body surface area is 1.79 meters squared.  No Pain (0) Comment: Data Unavailable   No LMP recorded. Patient is postmenopausal.  Allergies reviewed: Yes  Medications reviewed: Yes    Medications: Medication refills not needed today.  Pharmacy name entered into [facility]:    [facility] - [facility] [address] [facility] [place], [place] [address] [facility] [place], [place] - [address] [place] [address] [phone]  [id number] - [place], [place] - [address] AT [facility] [address]    Frailty Screening:   Is the patient here for a new oncology consult visit in cancer care? 2. No    PHQ9:  Did this patient require a PHQ9?: No      Clinical concerns:        Macey Forbes CMA              "

## 2025-06-11 ENCOUNTER — PATIENT OUTREACH (OUTPATIENT)
Dept: ONCOLOGY | Facility: CLINIC | Age: OVER 89
End: 2025-06-11
Payer: MEDICARE

## 2025-06-11 NOTE — TELEPHONE ENCOUNTER
Pt having loose stools every morning for a while. She has discussed with Dr. Zapien, but not recently, per patient. She has an appointment with him next Friday 3/9 to discuss. Pt said she took full dose of Imodium and didn't have a bowel movement for 4 days. Advised pt to try 1/2 dose of Imodium this weekend (1 capsule instead of 2). She will do this and give us an update Monday if not better.    Melissa Rodriguez, SHASHANKN, RN  [facility]     
Yes

## 2025-06-11 NOTE — PROGRESS NOTES
"New Patient Oncology Nurse Navigator Note     Referral Received: 06/11/25      Referring provider: Sd Fine MD     Referring Clinic/Organization: [facility]     Referred to: Malignant Hematology    Requested provider (if applicable): First available - did not specify      Evaluation for : C83.398 (ICD-10-CM) - Diffuse large B-cell lymphoma of extranodal site excluding spleen and other solid organs (H)      Clinical History (per Nurse review of records provided):      Case Report   Surgical Pathology Report                         Case: [id number]                                   Authorizing Provider:  Sd Fine MD    Collected:           06/03/2025 11:33 AM           Ordering Location:     [facility]     Received:            06/03/2025 11:58 AM                                  [address] [place]                                                             Pathologist:           Elenita Hernandez MD                                                     Specimen:    Lung, Left                                                                                Final Diagnosis   A. \"Lung\", left lower lobe lesion, core needle biopsy:                -Large B cell lymphoma, non-germinal center subtype                 -No evidence of carcinoma or melanoma                 -See comment    Electronically signed by Elenita Hernandez MD on 6/8/2025 at 0906 CDT   Comment  UUMAYO   The findings are diagnostic of B-cell lymphoma. The neoplastic cells are large and form aggregates/sheets, most consistent with a large B-cell lymphoma. By immunohistochemistry, a few tiny remnants of follicular dendritic cell meshworks are present - despite this, there is no convincing low grade/small cell component present, although the biopsy is small and thus sampling is limited.     The lymphoma cells are non-germinal center B-cell type according to immunohistochemistry and the Pradeep algorithm. The " differential diagnosis primarily includes diffuse large B-cell lymphoma (DLBCL) and diffuse/high grade B-cell lymphoma with MYC and BCL2 rearrangements or MYC and BCL6 rearrangements (double hit lymphomas). FISH for these gene rearrangements is in process. The results will be reported separately and integrated into an addendum to this report.       Records Location: [facility]     Additional testing needed prior to consult:     PET?  Labs?    Referral updates and Plan:     06/11/2025 2:54 PM -  Referral received and reviewed. [facility] pt at this time[facility]    06/12/2025 3:03 PM -  Attempted to call pt on home and mobile and left messages. Son's phone and DIL phone went directly to .  [employer] message sent and triage instructions sent to NPS.     Josseline Abbott, SHASHANKN, RN, OCN  Hematology/Oncology Nurse Navigator  [facility]  [phone] / [phone]

## 2025-06-12 DIAGNOSIS — Z11.4 ENCOUNTER FOR SCREENING FOR HUMAN IMMUNODEFICIENCY VIRUS (HIV): ICD-10-CM

## 2025-06-12 DIAGNOSIS — Z13.6 ENCOUNTER FOR SCREENING FOR CARDIOVASCULAR DISORDERS: ICD-10-CM

## 2025-06-12 DIAGNOSIS — C83.38 DIFFUSE LARGE B-CELL LYMPHOMA OF LYMPH NODES OF MULTIPLE REGIONS (H): Primary | ICD-10-CM

## 2025-06-12 DIAGNOSIS — Z72.89 OTHER PROBLEMS RELATED TO LIFESTYLE: ICD-10-CM

## 2025-06-12 DIAGNOSIS — Z11.59 ENCOUNTER FOR SCREENING FOR VIRAL DISEASE: ICD-10-CM

## 2025-06-16 ENCOUNTER — PATIENT OUTREACH (OUTPATIENT)
Dept: CARE COORDINATION | Facility: CLINIC | Age: OVER 89
End: 2025-06-16
Payer: MEDICARE

## 2025-06-16 NOTE — PROGRESS NOTES
[facility]  New patient history and physical  Jun 18, 2025  Hem/onc History:   HEMATOLOGIC HISTORY:   --April 2025: During regular surveillance for known transitional cell carcinoma, Ms. Oviedo was found to have interval progression of pleural-based lung nodules.  Biopsy revealed large B-cell lymphoma.     --4/23/2025 CT IMPRESSION:   Increased size of pleural-based lesions along the posterior and medial left hemithorax.   Pleura/lungs:  Left posteromedial enhancing pleural thickening measuring 2.2 x 0.6 cm (3/193) previously measured 2.1 x 7.4 cm.   Superior enhancing peripheral nodule in the left lower thorax measuring 2.2 x 1.3 cm (3/166) previously measured 1.2 x 0.6 cm.  Adjacent medial pleural nodule measuring 1.1 x 0.6 cm (3/159) is stable.  Stable calcified nodule in the posterior right lower lobe base.    6/3/25: Left lower lobe core needle biopsy; large B cell lymphoma. The neoplastic cells are positive for CD20, CD45, BCL-2, and MUM-1 but negative for CD5, CD10, CD30, c-Myc, CK AE1/AE3, HMB45, and DILIA-ELIZABETH.  The expression of c-MYC in neoplastic cells is low (less than 40%). The proliferation index by Ki-67 is approximately 20-30%. The lymphoma cells are non-germinal center B-cell type according to immunohistochemistry and the Pradeep algorithm.   FISH: pending    June 2025: CBCs reflect a new, mild anemia, MCV 86. CBC w/ differential otherwise within normal limits. CMPs reflect an eGFR of ~50, close to long-term baseline; otherwise within normal limits.   LDH:     ONCOLOGIC HISTORY:Adapted from prior notes.   1. 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 S/p Right robotic nephroureterectomy, excision of sandip-caval mass and LN excision 11/13/2018. Pathologic stage pT4N1 (1/6 lymph nodes). Residual FDG-avid disease demonstrated on PET/CT.   --S/p Adjuvant Carboplatin/Gemcitabine chemotherapy 12/2018  --GABRIELLE of serial  surveillance      2. Non-muscle invasive bladder cancer:  - 10/3/19: Cystoscopy showed a small area of erythema on retroflexion, possibly pre-existing or due to retroflexion of the scope. Urine cytology from that time showed cells suspicious for malignancy. 1/13/20: Bladder biopsy showed high grade urothelial carcinoma in-situ  - 2/12/20-3/18/20: Intravesical BCG therapy  - 7/24/20 and last cystoscopy was on the same day. It was negative. However, urine cytology was positive for high-grade urothelial carcinoma.   - 11/25/20-12/10/2020: 3 weekly doses of intravesical BCG 50mg.   - 12/10/20: Cytology suspicious and FISH urovysion positive for TCC.    History of Present Illness:   Today is my first day meeting Ms. Oviedo, who presents with her son Issa. Her daughter joins by telephone.       Past Medical History:     Past Medical History:   Diagnosis Date    CRESENCIO (acute kidney injury) 9/11/2018    Arthritis     Asthma 2/9/2022    Calculus of kidney     Chemotherapy-induced neutropenia 3/6/2019    Congestive heart failure (H)     Esophageal reflux     GERD (gastroesophageal reflux disease)     Hyperlipidemia LDL goal <130 5/9/2010    Malignant melanoma of skin of trunk, except scrotum (H)     Nonspecific abnormal finding     has living will 2004 -     Nontoxic multinodular goiter     no further eval /tx rec per pt    Osteopenia     Other psoriasis     Personal history of colonic polyps     PMR (polymyalgia rheumatica)     Restless legs syndrome 10/12/2005    Stress-induced cardiomyopathy     Undiagnosed cardiac murmurs     Unspecified constipation     Unspecified essential hypertension     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          Past Surgical History:      Past Surgical History:   Procedure Laterality Date    ARTHROPLASTY KNEE Right 11/9/2020    Procedure: ARTHROPLASTY, KNEE, TOTAL, RIGHT;  Surgeon: Edward Otero MD;  [facility]    BIOPSY      COLONOSCOPY  2014    COMBINED CYSTOSCOPY, INSERT STENT  URETER(S) Bilateral 9/12/2018    Procedure: COMBINED CYSTOSCOPY, INSERT STENT URETER(S);  Cystoscopy Bilateral ureteral Stent Placement.;  Surgeon: Justin Henry MD;  [facility]    COMBINED CYSTOSCOPY, RETROGRADES, URETEROSCOPY, INSERT STENT Bilateral 4/3/2018    Procedure: COMBINED CYSTOSCOPY, RETROGRADES, URETEROSCOPY, INSERT STENT;;  Surgeon: Stuart King MD;  [facility]    COMBINED CYSTOSCOPY, RETROGRADES, URETEROSCOPY, INSERT STENT Bilateral 9/10/2018    Procedure: COMBINED CYSTOSCOPY, RETROGRADES, URETEROSCOPY, INSERT STENT;  Cystoscopy, Bilateral Ureteroscopy, Bladder Biopsies, Retrogram Pyelograms, Ureteral Washings and brushings, cysview;  Surgeon: Stuart King MD;  [facility]    COMBINED CYSTOSCOPY, RETROGRADES, URETEROSCOPY, INSERT STENT Left 8/26/2020    Procedure: Cystoscopy, Left Ureteral Wash, Left ureteral Retrograde Pyelogram;  Surgeon: Justin Henry MD;  [facility]    CYSTOSCOPY, BIOPSY BLADDER INSTILL OPTICAL AGENT N/A 4/3/2018    Procedure: CYSTOSCOPY, BIOPSY BLADDER INSTILL OPTICAL AGENT;  Cystoscopy, Blue Light Cystoscopy, Bladder Biopsies, Bilateral Selective ureteral washings for Cytology, Bilateral Retrograde Pyelograms, Bilateral Ureteroscopy;  Surgeon: Stuart King MD;  [facility]    CYSTOSCOPY, BIOPSY BLADDER, COMBINED N/A 2/19/2018    Procedure: COMBINED CYSTOSCOPY, BIOPSY BLADDER;  Cystoscopy, Bladder Biopsy;  Surgeon: Kenna La MD;  [facility]    CYSTOSCOPY, BIOPSY BLADDER, COMBINED N/A 8/26/2020    Procedure: Cystoscopy, biopsy bladder, combined;  Surgeon: Justin Henry MD;  [facility]    CYSTOSCOPY, FULGURATE BLADDER TUMOR, COMBINED N/A 1/13/2020    Procedure: CYSTOSCOPY, WITH  bladder biopsies and fulguration;  Surgeon: Stuart King MD;  [facility]    CYSTOSCOPY, REMOVE STENT(S), COMBINED  11/13/2018    Procedure: Flexible Cystoscopy with Stent Removal;   Surgeon: Stuart King MD;  [facility]    DAVINCI LYMPHADENECTOMY N/A 11/13/2018    Procedure: Davinci Lymphadenectomy ;  Surgeon: Stuart King MD;  [facility]    DAVINCI NEPHROURETERECTOMY N/A 11/13/2018    Procedure: Right DaVinci Assisted Nephroureterectomy;  Surgeon: Stuart King MD;  [facility]    ENDOSCOPIC ULTRASOUND LOWER GASTROINTESTIONAL TRACT (GI) N/A 10/30/2015    Procedure: ENDOSCOPIC ULTRASOUND LOWER GASTROINTESTIONAL TRACT (GI);  Surgeon: Daniel Jean-Baptiste MD;  [facility]    ESOPHAGOSCOPY, GASTROSCOPY, DUODENOSCOPY (EGD), COMBINED N/A 11/29/2023    Procedure: Esophagoscopy, gastroscopy, duodenoscopy (EGD), combined;  Surgeon: Justin Peñaloza MD;  [facility]    EYE SURGERY  12/4/17    INSERT PORT VASCULAR ACCESS Right 12/19/2018    Procedure: Chest Port Placement - right;  Surgeon: Stuart Chavez PA-C;  [facility]    IR CHEST PORT PLACEMENT > 5 YRS OF AGE  12/19/2018    IR PORT REMOVAL RIGHT  6/26/2019    LAPAROSCOPIC CHOLECYSTECTOMY WITH CHOLANGIOGRAMS N/A 11/1/2015    Procedure: LAPAROSCOPIC CHOLECYSTECTOMY WITH CHOLANGIOGRAMS;  Surgeon: Tonie Warren MD;  [facility]    REMOVE PORT VASCULAR ACCESS Right 6/26/2019    Procedure: Right Port Removal;  Surgeon: Froilan Irizarry PA-C;  [facility]    SURGICAL HISTORY OF -   1996    malignant melanoma    SURGICAL HISTORY OF -   1968    thyroid nodule    SURGICAL HISTORY OF -       D & C    [facility] NONSPECIFIC PROCEDURE  2005    colonoscopy polyp repeat 2010          Social History:     Social History     Tobacco Use    Smoking status: Never     Passive exposure: Never    Smokeless tobacco: Never   Substance Use Topics    Alcohol use: No     Alcohol/week: 0.0 standard drinks of alcohol     Comment: rare          Family History:     Family History   Problem Relation Age of Onset    Cancer Father         dec - esophageal and laryngeal    Heart Disease Mother      Respiratory Mother         dec    Breast Cancer Daughter     Other Cancer Daughter     Thyroid Disease Daughter     Asthma Daughter     Hyperlipidemia Son     Diabetes Son     Anesthesia Reaction No family hx of     Deep Vein Thrombosis (DVT) No family hx of      Family history reviewed and updated in EPIC     Medications:       Current Outpatient Medications   Medication Sig Dispense Refill    alendronate (FOSAMAX) 70 MG tablet TAKE 1 TABLET EVERY 7 DAYS AT LEAST 60 MINUTES BEFORE BREAKFAST AS DIRECTED. 12 tablet 3    diltiazem ER (DILT-XR) 180 MG 24 hr capsule TAKE 1 CAPSULE EVERY DAY 90 capsule 3    empagliflozin (JARDIANCE) 10 MG TABS tablet Take 10 mg by mouth daily.      furosemide (LASIX) 20 MG tablet TAKE 1 TABLET TWICE DAILY IN THE MORNING AND AFTER LUNCH 180 tablet 3    irbesartan (AVAPRO) 300 MG tablet TAKE 1 TABLET EVERY DAY 90 tablet 3    lovastatin (MEVACOR) 40 MG tablet TAKE 1 TABLET AT BEDTIME (SCHEDULE ANNUAL VISIT, NEED FASTING LABS) 90 tablet 3    methimazole (TAPAZOLE) 5 MG tablet TAKE 1 TABLET ALTERNATING WITH 1/2 TABLET EVERY OTHER DAY 66 tablet 3    Omega-3 Fatty Acids (FISH OIL) 500 MG CAPS Take 1 capsule by mouth every morning       omeprazole (PRILOSEC) 20 MG DR capsule TAKE 1 CAPSULE EVERY DAY 90 capsule 3    propranolol ER (INDERAL LA) 60 MG 24 hr capsule TAKE 1 CAPSULE EVERY DAY 90 capsule 0    RESTASIS 0.05 % ophthalmic emulsion       rOPINIRole (REQUIP) 0.25 MG tablet TAKE 1 TABLET IN THE AFTERNOON AND 2 TABLETS AT NIGHT 270 tablet 0    sertraline (ZOLOFT) 100 MG tablet TAKE 1 TABLET EVERY MORNING 90 tablet 0    traZODone (DESYREL) 50 MG tablet TAKE 1/2 TABLET AT BEDTIME 45 tablet 0    XARELTO ANTICOAGULANT 15 MG TABS tablet TAKE 1 TABLET (15 MG) BY MOUTH DAILY (WITH DINNER) 90 tablet 3     No current facility-administered medications for this visit.        Physical Exam:   Blood pressure 111/65, pulse 80, temperature 98.1  F (36.7  C), temperature source Oral, resp. rate 16, weight  "78.1 kg (172 lb 3.2 oz), SpO2 95%, not currently breastfeeding.    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 Dimple Oviedo is an 89 year old woman with a history of CKD II, muscle-invasive transitional cell carcinoma of the right renal pelvis.    Her comorbidities include a history of metastatic transitional cell carcinoma s/p prior (non-anthracycline) chemotherapy, CKD II, permanent atrial fibrillation, and hypertension.     Today we discussed the natural history of large B-cell lymphoma. This is typically a fast-growing aggressive lymphoma that without treatment can be fatal in a matter of weeks to months. Treatment with modern immunochemotherapy approaches is able to induce remission in the majority of patients, and is curative for many patients. Occasionally, Large B-cell lymphoma will be found to have the presence of the Rosa Elena-Barr Virus (EBV). EBV is a lifelong virus that is carried by more than 90% of individuals worldwide.  EBV may play an active role in contributing to the development of some cases of lymphoma, but may also be a \"passenger\" virus in other cases.     We reviewed Mini-HOP in great detail today, using jargon-free language. We discussed specific drugs, doses, schedule, routes, and " potential side effects and toxicities: these include severe bone marrow suppression, neutropenia, infection, neutropenic fever, sepsis, anemia, bleeding, hair loss, nausea/vomiting/constipation, diarrhea, anorexia, abdominal pain, mouth pain (mucositis), rash, liver damage, kidney damage, or damage to the nervous system. Any organ may be damaged irreversibly, and this regimen may lead to fatal complications. Patients receiving chemotherapy may experience cytopenias significant enough to necessitate blood product transfusions. Chemotherapy can increase the risk of secondary cancers, including myelodysplastic syndrome (MDS) or acute myeloid leukemia (AML). Infusion reactions can commonly occur with Rituximab infusions. Tumor lysis syndrome can also occur and lead to dangerous electrolyte derangements.     Dimple will be 89 next week, has several existing comorbidities, and has already been treated previously with multi-agent cytototoxic chemotherapy.. She understands that pursuing immunochemotherapy now has a reasonable chance of inducing remission, but is unlikely to cure her lymphoma.There is also a significant chance of complications and toxicities from treatment.  At the conclusion of our visit, Dimple was uncertain whether she wanted to undergo treatment. We agreed to complete a PET/CT, TTE, and will also pursue PFTs in the coming weeks; we will plan to meet again in the 2nd week of July. She does have some difficulty with peripheral blood draws, so if we are pursuing systemic therapy, we should pursue chemoport placement before then. Alternatively, if she has only localized disease, we may pursue palliative radiation only.      She describes progressive dyspnea over the past several months -- it is possible that she has an infiltrative lymphomatous process involving the pleura, although multiple other causes of dyspnea are also possible -- we will pursue PFTs with DLCO testing as part of initial staging. If  her symptoms worsen, we may trial a short course of steroids.     She has a new, mild anemia -- defer BMBx for now, but will workup for other causes of anemia.      PLAN:  --Complete staging and workup to include PET/CT, TTE, PFTs.   --Anemia workup; defer BMBx  --Revisit need for LP -- did not discuss today.   --Favor R-mini-CHOP +/- radiation, depending on initial testing results    Nikunj Lazo MD, PhD  Division of Hematology, Oncology, and Transplantation  [facility]

## 2025-06-18 ENCOUNTER — PATIENT OUTREACH (OUTPATIENT)
Dept: ONCOLOGY | Facility: CLINIC | Age: OVER 89
End: 2025-06-18

## 2025-06-18 ENCOUNTER — ONCOLOGY VISIT (OUTPATIENT)
Dept: ONCOLOGY | Facility: CLINIC | Age: OVER 89
End: 2025-06-18
Attending: INTERNAL MEDICINE
Payer: MEDICARE

## 2025-06-18 ENCOUNTER — APPOINTMENT (OUTPATIENT)
Dept: LAB | Facility: CLINIC | Age: OVER 89
End: 2025-06-18
Attending: INTERNAL MEDICINE
Payer: MEDICARE

## 2025-06-18 VITALS
TEMPERATURE: 98.1 F | DIASTOLIC BLOOD PRESSURE: 65 MMHG | WEIGHT: 172.2 LBS | HEART RATE: 80 BPM | RESPIRATION RATE: 16 BRPM | OXYGEN SATURATION: 95 % | BODY MASS INDEX: 37.26 KG/M2 | SYSTOLIC BLOOD PRESSURE: 111 MMHG

## 2025-06-18 DIAGNOSIS — D64.9 ANEMIA, UNSPECIFIED TYPE: ICD-10-CM

## 2025-06-18 DIAGNOSIS — C83.38 DIFFUSE LARGE B-CELL LYMPHOMA OF LYMPH NODES OF MULTIPLE REGIONS (H): ICD-10-CM

## 2025-06-18 DIAGNOSIS — C66.1 UROTHELIAL CARCINOMA OF RIGHT DISTAL URETER (H): ICD-10-CM

## 2025-06-18 DIAGNOSIS — E05.00 GRAVES DISEASE: ICD-10-CM

## 2025-06-18 DIAGNOSIS — R91.8 PULMONARY NODULES: ICD-10-CM

## 2025-06-18 DIAGNOSIS — C83.398 DIFFUSE LARGE B-CELL LYMPHOMA OF EXTRANODAL SITE EXCLUDING SPLEEN AND OTHER SOLID ORGANS (H): ICD-10-CM

## 2025-06-18 DIAGNOSIS — Z72.89 OTHER PROBLEMS RELATED TO LIFESTYLE: ICD-10-CM

## 2025-06-18 DIAGNOSIS — Z11.59 ENCOUNTER FOR SCREENING FOR VIRAL DISEASE: ICD-10-CM

## 2025-06-18 DIAGNOSIS — Z11.4 ENCOUNTER FOR SCREENING FOR HUMAN IMMUNODEFICIENCY VIRUS (HIV): ICD-10-CM

## 2025-06-18 DIAGNOSIS — C68.9 UROTHELIAL CARCINOMA (H): ICD-10-CM

## 2025-06-18 DIAGNOSIS — C68.9 UROTHELIAL CANCER (H): Primary | ICD-10-CM

## 2025-06-18 LAB
ALBUMIN SERPL BCG-MCNC: 4.2 G/DL (ref 3.5–5.2)
ALP SERPL-CCNC: 93 U/L (ref 40–150)
ALT SERPL W P-5'-P-CCNC: 27 U/L (ref 0–50)
ANION GAP SERPL CALCULATED.3IONS-SCNC: 11 MMOL/L (ref 7–15)
AST SERPL W P-5'-P-CCNC: 27 U/L (ref 0–45)
BASOPHILS # BLD AUTO: 0.1 10E3/UL (ref 0–0.2)
BASOPHILS NFR BLD AUTO: 1 %
BILIRUB SERPL-MCNC: 0.7 MG/DL
BUN SERPL-MCNC: 20 MG/DL (ref 8–23)
CALCIUM SERPL-MCNC: 9.4 MG/DL (ref 8.8–10.4)
CHLORIDE SERPL-SCNC: 101 MMOL/L (ref 98–107)
CREAT SERPL-MCNC: 0.99 MG/DL (ref 0.51–0.95)
EGFRCR SERPLBLD CKD-EPI 2021: 54 ML/MIN/1.73M2
EOSINOPHIL # BLD AUTO: 0.1 10E3/UL (ref 0–0.7)
EOSINOPHIL NFR BLD AUTO: 1 %
ERYTHROCYTE [DISTWIDTH] IN BLOOD BY AUTOMATED COUNT: 16.8 % (ref 10–15)
GLUCOSE SERPL-MCNC: 114 MG/DL (ref 70–99)
HBV CORE AB SERPL QL IA: NONREACTIVE
HBV SURFACE AB SERPL IA-ACNC: 24.2 M[IU]/ML
HBV SURFACE AB SERPL IA-ACNC: REACTIVE M[IU]/ML
HCO3 SERPL-SCNC: 27 MMOL/L (ref 22–29)
HCT VFR BLD AUTO: 37.5 % (ref 35–47)
HCV AB SERPL QL IA: NONREACTIVE
HGB BLD-MCNC: 11.2 G/DL (ref 11.7–15.7)
HIV 1+2 AB+HIV1 P24 AG SERPL QL IA: NONREACTIVE
IMM GRANULOCYTES # BLD: 0 10E3/UL
IMM GRANULOCYTES NFR BLD: 1 %
LDH SERPL L TO P-CCNC: 212 U/L (ref 0–250)
LYMPHOCYTES # BLD AUTO: 0.9 10E3/UL (ref 0.8–5.3)
LYMPHOCYTES NFR BLD AUTO: 12 %
MCH RBC QN AUTO: 25.3 PG (ref 26.5–33)
MCHC RBC AUTO-ENTMCNC: 29.9 G/DL (ref 31.5–36.5)
MCV RBC AUTO: 85 FL (ref 78–100)
MONOCYTES # BLD AUTO: 0.6 10E3/UL (ref 0–1.3)
MONOCYTES NFR BLD AUTO: 8 %
NEUTROPHILS # BLD AUTO: 6 10E3/UL (ref 1.6–8.3)
NEUTROPHILS NFR BLD AUTO: 78 %
NRBC # BLD AUTO: 0 10E3/UL
NRBC BLD AUTO-RTO: 0 /100
PLATELET # BLD AUTO: 219 10E3/UL (ref 150–450)
POTASSIUM SERPL-SCNC: 4.1 MMOL/L (ref 3.4–5.3)
PROT SERPL-MCNC: 7.1 G/DL (ref 6.4–8.3)
RBC # BLD AUTO: 4.42 10E6/UL (ref 3.8–5.2)
SODIUM SERPL-SCNC: 139 MMOL/L (ref 135–145)
TOTAL PROTEIN SERUM FOR ELP: 6.9 G/DL (ref 6.4–8.3)
WBC # BLD AUTO: 7.7 10E3/UL (ref 4–11)

## 2025-06-18 PROCEDURE — G0463 HOSPITAL OUTPT CLINIC VISIT: HCPCS | Performed by: INTERNAL MEDICINE

## 2025-06-18 PROCEDURE — 82232 ASSAY OF BETA-2 PROTEIN: CPT | Performed by: INTERNAL MEDICINE

## 2025-06-18 PROCEDURE — 86706 HEP B SURFACE ANTIBODY: CPT | Performed by: INTERNAL MEDICINE

## 2025-06-18 PROCEDURE — 86334 IMMUNOFIX E-PHORESIS SERUM: CPT | Performed by: PATHOLOGY

## 2025-06-18 PROCEDURE — 87389 HIV-1 AG W/HIV-1&-2 AB AG IA: CPT | Performed by: INTERNAL MEDICINE

## 2025-06-18 PROCEDURE — 85025 COMPLETE CBC W/AUTO DIFF WBC: CPT | Performed by: INTERNAL MEDICINE

## 2025-06-18 PROCEDURE — 86704 HEP B CORE ANTIBODY TOTAL: CPT | Performed by: INTERNAL MEDICINE

## 2025-06-18 PROCEDURE — 86803 HEPATITIS C AB TEST: CPT | Performed by: INTERNAL MEDICINE

## 2025-06-18 PROCEDURE — 36415 COLL VENOUS BLD VENIPUNCTURE: CPT | Performed by: INTERNAL MEDICINE

## 2025-06-18 PROCEDURE — 83615 LACTATE (LD) (LDH) ENZYME: CPT | Performed by: INTERNAL MEDICINE

## 2025-06-18 PROCEDURE — 84155 ASSAY OF PROTEIN SERUM: CPT | Performed by: INTERNAL MEDICINE

## 2025-06-18 PROCEDURE — 83521 IG LIGHT CHAINS FREE EACH: CPT | Performed by: INTERNAL MEDICINE

## 2025-06-18 PROCEDURE — 84165 PROTEIN E-PHORESIS SERUM: CPT | Mod: TC | Performed by: PATHOLOGY

## 2025-06-18 ASSESSMENT — PAIN SCALES - GENERAL: PAINLEVEL_OUTOF10: NO PAIN (0)

## 2025-06-18 NOTE — NURSING NOTE
"Oncology Rooming Note    June 18, 2025 10:39 AM   Dimple Oviedo is a 89 year old female who presents for:    Chief Complaint   Patient presents with    Blood Draw     Vpt blood draw by lab RN    Oncology Clinic Visit     New Oncology     Initial Vitals: /65   Pulse 80   Temp 98.1  F (36.7  C) (Oral)   Resp 16   Wt 78.1 kg (172 lb 3.2 oz)   SpO2 95%   BMI 37.26 kg/m   Estimated body mass index is 37.26 kg/m  as calculated from the following:    Height as of 6/3/25: 1.448 m (4' 9\").    Weight as of this encounter: 78.1 kg (172 lb 3.2 oz). Body surface area is 1.77 meters squared.  No Pain (0) Comment: Data Unavailable   No LMP recorded. Patient is postmenopausal.  Allergies reviewed: Yes  Medications reviewed: Yes    Medications: Medication refills not needed today.  Pharmacy name entered into [employer]:    [facility] - [facility] [address] [facility] [place] [address] [facility] [place], [place] - [address] [place] [address] [phone]  [facility] #[id number] - [place], [place] - [address] AT [facility] OF [address]    Frailty Screening:   Is the patient here for a new oncology consult visit in cancer care? 2. No    PHQ9:  Did this patient require a PHQ9?: No      Clinical concerns: none      Iglesia Reynolds LPN              "

## 2025-06-18 NOTE — NURSING NOTE
Chief Complaint   Patient presents with    Blood Draw     Vpt blood draw by lab RN     Venipuncture labs drawn by lab RN, vitals taken and appointment arrived.    Klaudia Roldan RN

## 2025-06-18 NOTE — PROGRESS NOTES
[facility]: Cancer Care Initial Note                                    Discussion with Patient:                                                      I met with Dimple following her new patient visit with Dr. Nikunj Lazo. I introduced myself as her oncology RN Care Coordinator and provided her with my contact information. I explained my role in her care. Dimple was accompanied by her son Issa and her daughter Nidia participated in the visit via speaker phone since she lives in [place].      Dimple had previously completed a consent to communicate document and confirms that she does not need to update it at this time.    She was provided with a [employer] folder and we briefly reviewed the contents. Dimple needs to complete her staging. The PET scan and echocardiogram. Dimple and her family understand that our  will reach out to them to coordinate the pulmonary function testing before her follow up appointment with Dr. Lazo in a couple weeks. Her daughter Nidia states she will be out of the country on 7/10/2025 and asks if her mom can be seen before that date so Nidia can be part of the visit.     had reviewed options for Dimple and VIA oncology information was provided for Mini R-CHOP.      Dimple notes that she has difficulty with IV access. Dr. Lazo discussed placing a chemotherapy port if Dimple decides to proceed with treatment. Dimple states that she has had a port in the past and did not require any further education about it at this time.        Assessment:                                                      Initial  Current living arrangement:: I live alone  Type of residence:: Independent Senior Living  Informal Support system:: Children  Equipment Currently Used at Home: walker, rolling  Bed or wheelchair confined:: No  Mobility Status: Independent w/Device  Transportation means:: [employer] mobility, Family  Medication adherence problem (GOAL):: No  Referrals  Placed: None    Plan of Care Education Review:   Assessment completed with:: Patient, Children    Plan of Care Education   Yearly learning assessment completed?: Yes (see Education tab)  Does patient understand diagnosis?: Yes  Tx plan/regimen:: Mini R-CHOP  Vascular access education provided for:: Port (previously had a port)  Plan of Care:: Imaging, MD follow-up appointment  When to call provider:: Bleeding, Increased shortness of breath, New/worsening pain, Shaking chills, Temperature >100.4F, Uncontrolled diarrhea/constipation, Uncontrolled nausea/vomiting  Procedure education provided for: : Port/PICC placement    Evaluation of Learning  Readiness:: Acceptance  Method:: Literature  Response:: Verbalizes understanding           Intervention/Education provided during outreach:                                                       Dimple and her family were encouraged to call if they have any further questions. Follow up with Dr. Lazo will be scheduled for 7/9/2025.    Signature:  Pattie Gracía RN

## 2025-06-18 NOTE — LETTER
6/18/2025      Dimple Oviedo  [address]  [facility] [address]      Dear Colleague,    Thank you for referring your patient, Dimple Oviedo, to the [facility]. Please see a copy of my visit note below.    [facility]  New patient history and physical  Jun 18, 2025  Hem/onc History:   HEMATOLOGIC HISTORY:   --April 2025: During regular surveillance for known transitional cell carcinoma, Ms. Oviedo was found to have interval progression of pleural-based lung nodules.  Biopsy revealed large B-cell lymphoma.     --4/23/2025 CT IMPRESSION:   Increased size of pleural-based lesions along the posterior and medial left hemithorax.   Pleura/lungs:  Left posteromedial enhancing pleural thickening measuring 2.2 x 0.6 cm (3/193) previously measured 2.1 x 7.4 cm.   Superior enhancing peripheral nodule in the left lower thorax measuring 2.2 x 1.3 cm (3/166) previously measured 1.2 x 0.6 cm.  Adjacent medial pleural nodule measuring 1.1 x 0.6 cm (3/159) is stable.  Stable calcified nodule in the posterior right lower lobe base.    6/3/25: Left lower lobe core needle biopsy; large B cell lymphoma. The neoplastic cells are positive for CD20, CD45, BCL-2, and MUM-1 but negative for CD5, CD10, CD30, c-Myc, CK AE1/AE3, HMB45, and DILIA-ELIZABETH.  The expression of c-MYC in neoplastic cells is low (less than 40%). The proliferation index by Ki-67 is approximately 20-30%. The lymphoma cells are non-germinal center B-cell type according to immunohistochemistry and the Pradeep algorithm.   FISH: pending    June 2025: CBCs reflect a new, mild anemia, MCV 86. CBC w/ differential otherwise within normal limits. CMPs reflect an eGFR of ~50, close to long-term baseline; otherwise within normal limits.   LDH:     ONCOLOGIC HISTORY:Adapted from prior notes.   1. 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 S/p Right  robotic nephroureterectomy, excision of sandip-caval mass and LN excision 11/13/2018. Pathologic stage pT4N1 (1/6 lymph nodes). Residual FDG-avid disease demonstrated on PET/CT.   --S/p Adjuvant Carboplatin/Gemcitabine chemotherapy 12/2018  --GABRIELLE of serial surveillance      2. Non-muscle invasive bladder cancer:  - 10/3/19: Cystoscopy showed a small area of erythema on retroflexion, possibly pre-existing or due to retroflexion of the scope. Urine cytology from that time showed cells suspicious for malignancy. 1/13/20: Bladder biopsy showed high grade urothelial carcinoma in-situ  - 2/12/20-3/18/20: Intravesical BCG therapy  - 7/24/20 and last cystoscopy was on the same day. It was negative. However, urine cytology was positive for high-grade urothelial carcinoma.   - 11/25/20-12/10/2020: 3 weekly doses of intravesical BCG 50mg.   - 12/10/20: Cytology suspicious and FISH urovysion positive for TCC.    History of Present Illness:   Today is my first day meeting Ms. Oviedo, who presents with her son Issa. Her daughter joins by telephone.       Past Medical History:     Past Medical History:   Diagnosis Date     CRESENCIO (acute kidney injury) 9/11/2018     Arthritis      Asthma 2/9/2022     Calculus of kidney      Chemotherapy-induced neutropenia 3/6/2019     Congestive heart failure (H)      Esophageal reflux      GERD (gastroesophageal reflux disease)      Hyperlipidemia LDL goal <130 5/9/2010     Malignant melanoma of skin of trunk, except scrotum (H)      Nonspecific abnormal finding     has living will 2004 -      Nontoxic multinodular goiter     no further eval /tx rec per pt     Osteopenia      Other psoriasis      Personal history of colonic polyps      PMR (polymyalgia rheumatica)      Restless legs syndrome 10/12/2005     Stress-induced cardiomyopathy      Undiagnosed cardiac murmurs      Unspecified constipation      Unspecified essential hypertension      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          Past Surgical  History:      Past Surgical History:   Procedure Laterality Date     ARTHROPLASTY KNEE Right 11/9/2020    Procedure: ARTHROPLASTY, KNEE, TOTAL, RIGHT;  Surgeon: Edward Otero MD;  [facility]     BIOPSY       COLONOSCOPY  2014     COMBINED CYSTOSCOPY, INSERT STENT URETER(S) Bilateral 9/12/2018    Procedure: COMBINED CYSTOSCOPY, INSERT STENT URETER(S);  Cystoscopy Bilateral ureteral Stent Placement.;  Surgeon: Justin Henry MD;  [facility]     COMBINED CYSTOSCOPY, RETROGRADES, URETEROSCOPY, INSERT STENT Bilateral 4/3/2018    Procedure: COMBINED CYSTOSCOPY, RETROGRADES, URETEROSCOPY, INSERT STENT;;  Surgeon: Stuart King MD;  [facility]     COMBINED CYSTOSCOPY, RETROGRADES, URETEROSCOPY, INSERT STENT Bilateral 9/10/2018    Procedure: COMBINED CYSTOSCOPY, RETROGRADES, URETEROSCOPY, INSERT STENT;  Cystoscopy, Bilateral Ureteroscopy, Bladder Biopsies, Retrogram Pyelograms, Ureteral Washings and brushings, cysview;  Surgeon: Stuart King MD;  [facility]     COMBINED CYSTOSCOPY, RETROGRADES, URETEROSCOPY, INSERT STENT Left 8/26/2020    Procedure: Cystoscopy, Left Ureteral Wash, Left ureteral Retrograde Pyelogram;  Surgeon: Justin Henry MD;  [facility]     CYSTOSCOPY, BIOPSY BLADDER INSTILL OPTICAL AGENT N/A 4/3/2018    Procedure: CYSTOSCOPY, BIOPSY BLADDER INSTILL OPTICAL AGENT;  Cystoscopy, Blue Light Cystoscopy, Bladder Biopsies, Bilateral Selective ureteral washings for Cytology, Bilateral Retrograde Pyelograms, Bilateral Ureteroscopy;  Surgeon: Stuart King MD;  [facility]     CYSTOSCOPY, BIOPSY BLADDER, COMBINED N/A 2/19/2018    Procedure: COMBINED CYSTOSCOPY, BIOPSY BLADDER;  Cystoscopy, Bladder Biopsy;  Surgeon: Kenna La MD;  [facility]     CYSTOSCOPY, BIOPSY BLADDER, COMBINED N/A 8/26/2020    Procedure: Cystoscopy, biopsy bladder, combined;  Surgeon: Justin Henry MD;  [facility]      CYSTOSCOPY, FULGURATE BLADDER TUMOR, COMBINED N/A 1/13/2020    Procedure: CYSTOSCOPY, WITH  bladder biopsies and fulguration;  Surgeon: Stuart King MD;  [facility]     CYSTOSCOPY, REMOVE STENT(S), COMBINED  11/13/2018    Procedure: Flexible Cystoscopy with Stent Removal;  Surgeon: Stuart King MD;  [facility]     DAVINCI LYMPHADENECTOMY N/A 11/13/2018    Procedure: Davinci Lymphadenectomy ;  Surgeon: Stuart King MD;  [facility]     DAVINCI NEPHROURETERECTOMY N/A 11/13/2018    Procedure: Right DaVinci Assisted Nephroureterectomy;  Surgeon: Stuart King MD;  [facility]     ENDOSCOPIC ULTRASOUND LOWER GASTROINTESTIONAL TRACT (GI) N/A 10/30/2015    Procedure: ENDOSCOPIC ULTRASOUND LOWER GASTROINTESTIONAL TRACT (GI);  Surgeon: Daniel Jean-Baptiste MD;  [facility]     ESOPHAGOSCOPY, GASTROSCOPY, DUODENOSCOPY (EGD), COMBINED N/A 11/29/2023    Procedure: Esophagoscopy, gastroscopy, duodenoscopy (EGD), combined;  Surgeon: Justin Peñaloza MD;  [facility]     EYE SURGERY  12/4/17     INSERT PORT VASCULAR ACCESS Right 12/19/2018    Procedure: Chest Port Placement - right;  Surgeon: Stuart Chavez PA-C;  [facility]     IR CHEST PORT PLACEMENT > 5 YRS OF AGE  12/19/2018     IR PORT REMOVAL RIGHT  6/26/2019     LAPAROSCOPIC CHOLECYSTECTOMY WITH CHOLANGIOGRAMS N/A 11/1/2015    Procedure: LAPAROSCOPIC CHOLECYSTECTOMY WITH CHOLANGIOGRAMS;  Surgeon: Tonie Warren MD;  [facility]     REMOVE PORT VASCULAR ACCESS Right 6/26/2019    Procedure: Right Port Removal;  Surgeon: Froilan Irizarry PA-C;  [facility]     SURGICAL HISTORY OF -   1996    malignant melanoma     SURGICAL HISTORY OF -   1968    thyroid nodule     SURGICAL HISTORY OF -       D & C     ZZC NONSPECIFIC PROCEDURE  2005    colonoscopy polyp repeat 2010          Social History:     Social History     Tobacco Use     Smoking status: Never     Passive  exposure: Never     Smokeless tobacco: Never   Substance Use Topics     Alcohol use: No     Alcohol/week: 0.0 standard drinks of alcohol     Comment: rare          Family History:     Family History   Problem Relation Age of Onset     Cancer Father         dec - esophageal and laryngeal     Heart Disease Mother      Respiratory Mother         dec     Breast Cancer Daughter      Other Cancer Daughter      Thyroid Disease Daughter      Asthma Daughter      Hyperlipidemia Son      Diabetes Son      Anesthesia Reaction No family hx of      Deep Vein Thrombosis (DVT) No family hx of      Family history reviewed and updated in EPIC     Medications:       Current Outpatient Medications   Medication Sig Dispense Refill     alendronate (FOSAMAX) 70 MG tablet TAKE 1 TABLET EVERY 7 DAYS AT LEAST 60 MINUTES BEFORE BREAKFAST AS DIRECTED. 12 tablet 3     diltiazem ER (DILT-XR) 180 MG 24 hr capsule TAKE 1 CAPSULE EVERY DAY 90 capsule 3     empagliflozin (JARDIANCE) 10 MG TABS tablet Take 10 mg by mouth daily.       furosemide (LASIX) 20 MG tablet TAKE 1 TABLET TWICE DAILY IN THE MORNING AND AFTER LUNCH 180 tablet 3     irbesartan (AVAPRO) 300 MG tablet TAKE 1 TABLET EVERY DAY 90 tablet 3     lovastatin (MEVACOR) 40 MG tablet TAKE 1 TABLET AT BEDTIME (SCHEDULE ANNUAL VISIT, NEED FASTING LABS) 90 tablet 3     methimazole (TAPAZOLE) 5 MG tablet TAKE 1 TABLET ALTERNATING WITH 1/2 TABLET EVERY OTHER DAY 66 tablet 3     Omega-3 Fatty Acids (FISH OIL) 500 MG CAPS Take 1 capsule by mouth every morning        omeprazole (PRILOSEC) 20 MG DR capsule TAKE 1 CAPSULE EVERY DAY 90 capsule 3     propranolol ER (INDERAL LA) 60 MG 24 hr capsule TAKE 1 CAPSULE EVERY DAY 90 capsule 0     RESTASIS 0.05 % ophthalmic emulsion        rOPINIRole (REQUIP) 0.25 MG tablet TAKE 1 TABLET IN THE AFTERNOON AND 2 TABLETS AT NIGHT 270 tablet 0     sertraline (ZOLOFT) 100 MG tablet TAKE 1 TABLET EVERY MORNING 90 tablet 0     traZODone (DESYREL) 50 MG tablet TAKE  1/2 TABLET AT BEDTIME 45 tablet 0     XARELTO ANTICOAGULANT 15 MG TABS tablet TAKE 1 TABLET (15 MG) BY MOUTH DAILY (WITH DINNER) 90 tablet 3     No current facility-administered medications for this visit.        Physical Exam:   Blood pressure 111/65, pulse 80, temperature 98.1  F (36.7  C), temperature source Oral, resp. rate 16, weight 78.1 kg (172 lb 3.2 oz), SpO2 95%, not currently breastfeeding.    ECOG PS: 2  General: NAD; H&N: no mucosal lesions; Lungs clear; Heart RRR; Abdomen; Soft, No organomegaly; Extremities: No edema; Skin: No rash; Neuro: Nonfocal; Mood/Affect: appropriate; Lymph: no LAD    Assessment and Plan:   # EBV+ Large B-cell lymphoma, Non-GC, staging/workup underway  # CKD II, eGFR 50  # Asthma  # Arthritis  # GERD  # Osteopenia  # Polymylagia rheumatica  # Stress-induced cardiomyopathy  # Hypertension  # History of high-grade, muscle-invasive, transitional cell carcinoma of right renal pelvis, stage IV ([id number])  # History of adjuvant carboplatin/gemcitabine chemotherapy  # Permanent atrial fibrillation, on chronic anticoagulation  # Tricuspid regurgitation, aortic regurgitation  # Possible pulmonary hypertension     Ms. Dimple Oviedo is an 89 year old woman with a history of CKD II, muscle-invasive transitional cell carcinoma of the right renal pelvis.    Her comorbidities include a history of metastatic transitional cell carcinoma s/p prior (non-anthracycline) chemotherapy, CKD II, permanent atrial fibrillation, and hypertension.     Today we discussed the natural history of large B-cell lymphoma. This is typically a fast-growing aggressive lymphoma that without treatment can be fatal in a matter of weeks to months. Treatment with modern immunochemotherapy approaches is able to induce remission in the majority of patients, and is curative for many patients. Occasionally, Large B-cell lymphoma will be found to have the presence of the Rosa Elena-Barr Virus (EBV). EBV is a lifelong virus that  "is carried by more than 90% of individuals worldwide.  EBV may play an active role in contributing to the development of some cases of lymphoma, but may also be a \"passenger\" virus in other cases.     We reviewed Mini-HOP in great detail today, using jargon-free language. We discussed specific drugs, doses, schedule, routes, and potential side effects and toxicities: these include severe bone marrow suppression, neutropenia, infection, neutropenic fever, sepsis, anemia, bleeding, hair loss, nausea/vomiting/constipation, diarrhea, anorexia, abdominal pain, mouth pain (mucositis), rash, liver damage, kidney damage, or damage to the nervous system. Any organ may be damaged irreversibly, and this regimen may lead to fatal complications. Patients receiving chemotherapy may experience cytopenias significant enough to necessitate blood product transfusions. Chemotherapy can increase the risk of secondary cancers, including myelodysplastic syndrome (MDS) or acute myeloid leukemia (AML). Infusion reactions can commonly occur with Rituximab infusions. Tumor lysis syndrome can also occur and lead to dangerous electrolyte derangements.     Dimple will be 89 next week, has several existing comorbidities, and has already been treated previously with multi-agent cytototoxic chemotherapy.. She understands that pursuing immunochemotherapy now has a reasonable chance of inducing remission, but is unlikely to cure her lymphoma.There is also a significant chance of complications and toxicities from treatment.  At the conclusion of our visit, Dimple was uncertain whether she wanted to undergo treatment. We agreed to complete a PET/CT, TTE, and will also pursue PFTs in the coming weeks; we will plan to meet again in the 2nd week of July. She does have some difficulty with peripheral blood draws, so if we are pursuing systemic therapy, we should pursue chemoport placement before then. Alternatively, if she has only localized disease, " we may pursue palliative radiation only.      She describes progressive dyspnea over the past several months -- it is possible that she has an infiltrative lymphomatous process involving the pleura, although multiple other causes of dyspnea are also possible -- we will pursue PFTs with DLCO testing as part of initial staging. If her symptoms worsen, we may trial a short course of steroids.     She has a new, mild anemia -- defer BMBx for now, but will workup for other causes of anemia.      PLAN:  --Complete staging and workup to include PET/CT, TTE, PFTs.   --Anemia workup; defer BMBx  --Revisit need for LP -- did not discuss today.   --Favor R-mini-CHOP +/- radiation, depending on initial testing results    Nikunj Lazo MD, PhD  Division of Hematology, Oncology, and Transplantation  [facility]      Again, thank you for allowing me to participate in the care of your patient.        Sincerely,        Nikunj Lazo MD    Electronically signed

## 2025-06-19 PROBLEM — C83.38 DIFFUSE LARGE B-CELL LYMPHOMA OF LYMPH NODES OF MULTIPLE REGIONS (H): Status: ACTIVE | Noted: 2025-06-19

## 2025-06-19 LAB
ALBUMIN SERPL ELPH-MCNC: 3.8 G/DL (ref 3.7–5.1)
ALPHA1 GLOB SERPL ELPH-MCNC: 0.4 G/DL (ref 0.2–0.4)
ALPHA2 GLOB SERPL ELPH-MCNC: 0.8 G/DL (ref 0.5–0.9)
B-GLOBULIN SERPL ELPH-MCNC: 0.9 G/DL (ref 0.6–1)
B2 MICROGLOB TUMOR MARKER SER-MCNC: 3.7 MG/L
GAMMA GLOB SERPL ELPH-MCNC: 1 G/DL (ref 0.7–1.6)
INTERPRETATION: NORMAL
KAPPA LC FREE SER-MCNC: 3.07 MG/DL (ref 0.33–1.94)
KAPPA LC FREE/LAMBDA FREE SER NEPH: 1.27 {RATIO} (ref 0.26–1.65)
LAMBDA LC FREE SERPL-MCNC: 2.42 MG/DL (ref 0.57–2.63)
M PROTEIN SERPL ELPH-MCNC: 0 G/DL
PROT PATTERN SERPL ELPH-IMP: NORMAL
PROT PATTERN SERPL IFE-IMP: NORMAL

## 2025-06-23 LAB
PATH REPORT.ADDENDUM SPEC: ABNORMAL
PATH REPORT.COMMENTS IMP SPEC: ABNORMAL
PATH REPORT.COMMENTS IMP SPEC: YES
PATH REPORT.FINAL DX SPEC: ABNORMAL
PATH REPORT.GROSS SPEC: ABNORMAL
PATH REPORT.MICROSCOPIC SPEC OTHER STN: ABNORMAL
PATH REPORT.RELEVANT HX SPEC: ABNORMAL
PHOTO IMAGE: ABNORMAL

## 2025-06-25 ENCOUNTER — LAB (OUTPATIENT)
Dept: LAB | Facility: CLINIC | Age: OVER 89
End: 2025-06-25
Payer: MEDICARE

## 2025-06-25 ENCOUNTER — HOSPITAL ENCOUNTER (OUTPATIENT)
Dept: PET IMAGING | Facility: CLINIC | Age: OVER 89
Discharge: HOME | End: 2025-06-25
Attending: INTERNAL MEDICINE
Payer: MEDICARE

## 2025-06-25 DIAGNOSIS — C83.38 DIFFUSE LARGE B-CELL LYMPHOMA OF LYMPH NODES OF MULTIPLE REGIONS (H): ICD-10-CM

## 2025-06-25 LAB
ALBUMIN SERPL BCG-MCNC: 4 G/DL (ref 3.5–5.2)
ALP SERPL-CCNC: 89 U/L (ref 40–150)
ALT SERPL W P-5'-P-CCNC: 28 U/L (ref 0–50)
ANION GAP SERPL CALCULATED.3IONS-SCNC: 10 MMOL/L (ref 7–15)
AST SERPL W P-5'-P-CCNC: 35 U/L (ref 0–45)
BASOPHILS # BLD AUTO: 0.1 10E3/UL (ref 0–0.2)
BASOPHILS NFR BLD AUTO: 1 %
BILIRUB SERPL-MCNC: 0.7 MG/DL
BUN SERPL-MCNC: 22.3 MG/DL (ref 8–23)
CALCIUM SERPL-MCNC: 9.1 MG/DL (ref 8.8–10.4)
CHLORIDE SERPL-SCNC: 104 MMOL/L (ref 98–107)
CREAT SERPL-MCNC: 0.99 MG/DL (ref 0.51–0.95)
EGFRCR SERPLBLD CKD-EPI 2021: 53 ML/MIN/1.73M2
EOSINOPHIL # BLD AUTO: 0.1 10E3/UL (ref 0–0.7)
EOSINOPHIL NFR BLD AUTO: 1 %
ERYTHROCYTE [DISTWIDTH] IN BLOOD BY AUTOMATED COUNT: 16.4 % (ref 10–15)
FERRITIN SERPL-MCNC: 83 NG/ML (ref 11–328)
FOLATE SERPL-MCNC: 15 NG/ML (ref 4.6–34.8)
GLUCOSE SERPL-MCNC: 107 MG/DL (ref 70–99)
HCO3 SERPL-SCNC: 28 MMOL/L (ref 22–29)
HCT VFR BLD AUTO: 36.7 % (ref 35–47)
HGB BLD-MCNC: 11 G/DL (ref 11.7–15.7)
IMM GRANULOCYTES # BLD: 0 10E3/UL
IMM GRANULOCYTES NFR BLD: 0 %
IRON BINDING CAPACITY (ROCHE): 409 UG/DL (ref 240–430)
IRON SATN MFR SERPL: 10 % (ref 15–46)
IRON SERPL-MCNC: 40 UG/DL (ref 37–145)
LDH SERPL L TO P-CCNC: 265 U/L (ref 0–250)
LYMPHOCYTES # BLD AUTO: 1.4 10E3/UL (ref 0.8–5.3)
LYMPHOCYTES NFR BLD AUTO: 19 %
MCH RBC QN AUTO: 25.2 PG (ref 26.5–33)
MCHC RBC AUTO-ENTMCNC: 30 G/DL (ref 31.5–36.5)
MCV RBC AUTO: 84 FL (ref 78–100)
MONOCYTES # BLD AUTO: 0.6 10E3/UL (ref 0–1.3)
MONOCYTES NFR BLD AUTO: 8 %
NEUTROPHILS # BLD AUTO: 5.1 10E3/UL (ref 1.6–8.3)
NEUTROPHILS NFR BLD AUTO: 71 %
NRBC # BLD AUTO: 0 10E3/UL
NRBC BLD AUTO-RTO: 0 /100
PLATELET # BLD AUTO: 219 10E3/UL (ref 150–450)
POTASSIUM SERPL-SCNC: 4.2 MMOL/L (ref 3.4–5.3)
PROT SERPL-MCNC: 7 G/DL (ref 6.4–8.3)
RBC # BLD AUTO: 4.36 10E6/UL (ref 3.8–5.2)
RETICS # AUTO: 0.09 10E6/UL (ref 0.03–0.1)
RETICS/RBC NFR AUTO: 2.2 % (ref 0.5–2)
SODIUM SERPL-SCNC: 142 MMOL/L (ref 135–145)
VIT B12 SERPL-MCNC: 347 PG/ML (ref 232–1245)
WBC # BLD AUTO: 7.2 10E3/UL (ref 4–11)

## 2025-06-25 PROCEDURE — 999N000128 HC STATISTIC PERIPHERAL IV START W/O US GUIDANCE

## 2025-06-25 PROCEDURE — 82607 VITAMIN B-12: CPT

## 2025-06-25 PROCEDURE — 82728 ASSAY OF FERRITIN: CPT

## 2025-06-25 PROCEDURE — 82746 ASSAY OF FOLIC ACID SERUM: CPT

## 2025-06-25 PROCEDURE — 85045 AUTOMATED RETICULOCYTE COUNT: CPT

## 2025-06-25 PROCEDURE — 343N000001 HC RX 343 MED OP 636: Performed by: INTERNAL MEDICINE

## 2025-06-25 PROCEDURE — A9552 F18 FDG: HCPCS | Performed by: INTERNAL MEDICINE

## 2025-06-25 PROCEDURE — 78816 PET IMAGE W/CT FULL BODY: CPT | Mod: PI

## 2025-06-25 PROCEDURE — 36415 COLL VENOUS BLD VENIPUNCTURE: CPT

## 2025-06-25 PROCEDURE — 87799 DETECT AGENT NOS DNA QUANT: CPT

## 2025-06-25 PROCEDURE — 82040 ASSAY OF SERUM ALBUMIN: CPT

## 2025-06-25 PROCEDURE — 71260 CT THORAX DX C+: CPT

## 2025-06-25 PROCEDURE — 83550 IRON BINDING TEST: CPT

## 2025-06-25 PROCEDURE — 85004 AUTOMATED DIFF WBC COUNT: CPT

## 2025-06-25 PROCEDURE — 83615 LACTATE (LD) (LDH) ENZYME: CPT

## 2025-06-25 PROCEDURE — 250N000011 HC RX IP 250 OP 636: Performed by: INTERNAL MEDICINE

## 2025-06-25 RX ORDER — IOPAMIDOL 755 MG/ML
10-135 INJECTION, SOLUTION INTRAVASCULAR ONCE
Status: COMPLETED | OUTPATIENT
Start: 2025-06-25 | End: 2025-06-25

## 2025-06-25 RX ORDER — FLUDEOXYGLUCOSE F 18 200 MCI/ML
10-18 INJECTION, SOLUTION INTRAVENOUS ONCE
Status: COMPLETED | OUTPATIENT
Start: 2025-06-25 | End: 2025-06-25

## 2025-06-25 RX ADMIN — IOPAMIDOL 105 ML: 755 INJECTION, SOLUTION INTRAVENOUS at 14:28

## 2025-06-25 RX ADMIN — FLUDEOXYGLUCOSE F 18 10.84 MILLICURIE: 200 INJECTION, SOLUTION INTRAVENOUS at 13:32

## 2025-06-26 LAB — EBV DNA SERPL NAA+PROBE-ACNC: NOT DETECTED IU/ML

## 2025-07-01 ENCOUNTER — HOSPITAL ENCOUNTER (OUTPATIENT)
Dept: CARDIOLOGY | Facility: CLINIC | Age: OVER 89
Discharge: HOME | End: 2025-07-01
Attending: INTERNAL MEDICINE
Payer: MEDICARE

## 2025-07-01 DIAGNOSIS — Z13.6 ENCOUNTER FOR SCREENING FOR CARDIOVASCULAR DISORDERS: ICD-10-CM

## 2025-07-01 DIAGNOSIS — C83.38 DIFFUSE LARGE B-CELL LYMPHOMA OF LYMPH NODES OF MULTIPLE REGIONS (H): ICD-10-CM

## 2025-07-01 LAB — LVEF ECHO: NORMAL

## 2025-07-01 PROCEDURE — 93306 TTE W/DOPPLER COMPLETE: CPT

## 2025-07-09 ENCOUNTER — ONCOLOGY VISIT (OUTPATIENT)
Dept: ONCOLOGY | Facility: CLINIC | Age: OVER 89
End: 2025-07-09
Attending: INTERNAL MEDICINE
Payer: MEDICARE

## 2025-07-09 VITALS
OXYGEN SATURATION: 96 % | BODY MASS INDEX: 36.55 KG/M2 | HEART RATE: 72 BPM | SYSTOLIC BLOOD PRESSURE: 123 MMHG | TEMPERATURE: 97.4 F | WEIGHT: 168.9 LBS | RESPIRATION RATE: 28 BRPM | DIASTOLIC BLOOD PRESSURE: 64 MMHG

## 2025-07-09 DIAGNOSIS — C83.38 DIFFUSE LARGE B-CELL LYMPHOMA OF LYMPH NODES OF MULTIPLE REGIONS (H): Primary | ICD-10-CM

## 2025-07-09 PROCEDURE — G0463 HOSPITAL OUTPT CLINIC VISIT: HCPCS | Performed by: INTERNAL MEDICINE

## 2025-07-09 PROCEDURE — 99214 OFFICE O/P EST MOD 30 MIN: CPT | Performed by: INTERNAL MEDICINE

## 2025-07-09 ASSESSMENT — PAIN SCALES - GENERAL: PAINLEVEL_OUTOF10: NO PAIN (0)

## 2025-07-09 NOTE — NURSING NOTE
"Oncology Rooming Note    July 9, 2025 2:18 PM   Dimple Oviedo is a 89 year old female who presents for:    Chief Complaint   Patient presents with    Oncology Clinic Visit     RTN Malignant neoplasm of overlapping sites of bladder      Initial Vitals: /64 (BP Location: Right arm, Patient Position: Sitting, Cuff Size: Adult Large)   Pulse 72   Temp 97.4  F (36.3  C) (Oral)   Resp 28   Wt 76.6 kg (168 lb 14.4 oz)   SpO2 96%   BMI 36.55 kg/m   Estimated body mass index is 36.55 kg/m  as calculated from the following:    Height as of 6/3/25: 1.448 m (4' 9\").    Weight as of this encounter: 76.6 kg (168 lb 14.4 oz). Body surface area is 1.76 meters squared.  No Pain (0) Comment: Data Unavailable   No LMP recorded. Patient is postmenopausal.  Allergies reviewed: Yes  Medications reviewed: Yes    Medications: Medication refills not needed today.  Pharmacy name entered into [facility]:    [facility] - [place] [id number] [facility] [place] [address] [facility] [place] [address] [place] [address] [phone]  [id number] - [place], [place] - [address] AT [facility] [address]  [facility] [address] [place] [address] [id number] [place] [address]    Frailty Screening:   Is the patient here for a new oncology consult visit in cancer care? 2. No    PHQ9:  Did this patient require a PHQ9?: No      Clinical concerns: Discuss if it is ok to take Trazodone before bed.       Checo Castro             "

## 2025-07-09 NOTE — PROGRESS NOTES
[facility]  Follow Up Visit  Jul 9, 2025  Hem/onc History:   HEMATOLOGIC HISTORY:   --April 2025: During regular surveillance for known transitional cell carcinoma, Ms. Oviedo was found to have interval progression of pleural-based lung nodules.  Biopsy revealed large B-cell lymphoma.   --4/23/2025 CT IMPRESSION:   Increased size of pleural-based lesions along the posterior and medial left hemithorax.   Pleura/lungs:  Left posteromedial enhancing pleural thickening measuring 2.2 x 0.6 cm (3/193) previously measured 2.1 x 7.4 cm.   Superior enhancing peripheral nodule in the left lower thorax measuring 2.2 x 1.3 cm (3/166) previously measured 1.2 x 0.6 cm.  Adjacent medial pleural nodule measuring 1.1 x 0.6 cm (3/159) is stable.  Stable calcified nodule in the posterior right lower lobe base.  6/3/25: Left lower lobe core needle biopsy; large B cell lymphoma. The neoplastic cells are positive for CD20, CD45, BCL-2, and MUM-1 but negative for CD5, CD10, CD30, c-Myc, CK AE1/AE3, HMB45, and DILIA-ELIZABETH.  The expression of c-MYC in neoplastic cells is low (less than 40%). The proliferation index by Ki-67 is approximately 20-30%. The lymphoma cells are non-germinal center B-cell type according to immunohistochemistry and the Pradeep algorithm.   FISH: No rearrangements of MYC, BCL-2, or BCL-6.   --June 2025: CBCs reflect a new, mild anemia, MCV 86. CBC w/ differential otherwise within normal limits. CMPs reflect an eGFR of ~50, close to long-term baseline; otherwise within normal limits.   6/18/25 LDH: 218 (normal range). SPEP without monoclonal protein.   Iron studies within normal limits except Iron Sat 10 (low). Vitamin B12 346 (low-normal). Folate 15.0. HBV, HCV, HIV Serologies negative. EBV viral load undetectable.   6/25/25 PET/CT: IMPRESSION: 1.  Multifocal hypermetabolic pleural-based soft tissue foci along the dependent left lower lobe, representing biopsy proven diffuse large B-cell lymphoma. Note that one  of these foci was present with hypermetabolism on 9/13/2023. 2.  Persistent intense uptake within a left lower pole thyroid nodule, not significantly changed, with significant enlarged multinodular  thyroid overall. No nodule requiring biopsy found on ultrasound dated 5/30/2024. Recommend continued attention on follow-up imaging. 3.  Increased uptake within the stomach, with small hiatal hernia,  which may represent esophagogastritis. Recommend correlation patient's symptoms. 4.  Incidental CT findings, as above.  7/1/25 TTE: Global and regional left ventricular function is normal with an EF of 55-60%. The right ventricular systolic pressure is 37mmHg above the right atrial  pressure. Mild aortic stenosis is present. Severe biatrial enlargement is present.       ONCOLOGIC HISTORY:Adapted from prior notes.   1. 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 S/p Right robotic nephroureterectomy, excision of sandip-caval mass and LN excision 11/13/2018. Pathologic stage pT4N1 (1/6 lymph nodes). Residual FDG-avid disease demonstrated on PET/CT.   --S/p Adjuvant Carboplatin/Gemcitabine chemotherapy 12/2018  --GABRIELLE of serial surveillance      2. Non-muscle invasive bladder cancer:  - 10/3/19: Cystoscopy showed a small area of erythema on retroflexion, possibly pre-existing or due to retroflexion of the scope. Urine cytology from that time showed cells suspicious for malignancy. 1/13/20: Bladder biopsy showed high grade urothelial carcinoma in-situ  - 2/12/20-3/18/20: Intravesical BCG therapy  - 7/24/20 and last cystoscopy was on the same day. It was negative. However, urine cytology was positive for high-grade urothelial carcinoma.   - 11/25/20-12/10/2020: 3 weekly doses of intravesical BCG 50mg.   - 12/10/20: Cytology suspicious and FISH urovysion positive for TCC.    History of Present Illness:    Ms. Oviedo returns along with  her son Issa. Her  daughter joins by telephone. She is feeling well. No recent infectious episodes or concerns.     Medications:       Current Outpatient Medications   Medication Sig Dispense Refill    alendronate (FOSAMAX) 70 MG tablet TAKE 1 TABLET EVERY 7 DAYS AT LEAST 60 MINUTES BEFORE BREAKFAST AS DIRECTED. 12 tablet 3    diltiazem ER (DILT-XR) 180 MG 24 hr capsule TAKE 1 CAPSULE EVERY DAY 90 capsule 3    empagliflozin (JARDIANCE) 10 MG TABS tablet Take 10 mg by mouth daily.      furosemide (LASIX) 20 MG tablet TAKE 1 TABLET TWICE DAILY IN THE MORNING AND AFTER LUNCH 180 tablet 3    irbesartan (AVAPRO) 300 MG tablet TAKE 1 TABLET EVERY DAY 90 tablet 3    lovastatin (MEVACOR) 40 MG tablet TAKE 1 TABLET AT BEDTIME (SCHEDULE ANNUAL VISIT, NEED FASTING LABS) 90 tablet 3    methimazole (TAPAZOLE) 5 MG tablet TAKE 1 TABLET ALTERNATING WITH 1/2 TABLET EVERY OTHER DAY 66 tablet 3    Omega-3 Fatty Acids (FISH OIL) 500 MG CAPS Take 1 capsule by mouth every morning       omeprazole (PRILOSEC) 20 MG DR capsule TAKE 1 CAPSULE EVERY DAY 90 capsule 3    propranolol ER (INDERAL LA) 60 MG 24 hr capsule TAKE 1 CAPSULE EVERY DAY 90 capsule 0    RESTASIS 0.05 % ophthalmic emulsion       rOPINIRole (REQUIP) 0.25 MG tablet TAKE 1 TABLET IN THE AFTERNOON AND 2 TABLETS AT NIGHT 270 tablet 0    sertraline (ZOLOFT) 100 MG tablet TAKE 1 TABLET EVERY MORNING 90 tablet 0    traZODone (DESYREL) 50 MG tablet TAKE 1/2 TABLET AT BEDTIME 45 tablet 0    XARELTO ANTICOAGULANT 15 MG TABS tablet TAKE 1 TABLET (15 MG) BY MOUTH DAILY (WITH DINNER) 90 tablet 3     No current facility-administered medications for this visit.        Physical Exam:   /64 (BP Location: Right arm, Patient Position: Sitting, Cuff Size: Adult Large)   Pulse 72   Temp 97.4  F (36.3  C) (Oral)   Resp 28   Wt 76.6 kg (168 lb 14.4 oz)   SpO2 96%   BMI 36.55 kg/m      ECOG PS: 2  General: NAD; H&N: no mucosal lesions; Lungs clear; Heart RRR; Abdomen; Soft, No organomegaly; Extremities:  No edema; Skin: No rash; Neuro: Nonfocal; Mood/Affect: appropriate; Lymph: no LAD  Assessment and Plan:   # EBV+ Large B-cell lymphoma, Non-GC, staging/workup underway  # CKD II, eGFR 50  # Asthma  # Arthritis  # GERD  # Osteopenia  # Polymylagia rheumatica  # Stress-induced cardiomyopathy  # Hypertension  # History of high-grade, muscle-invasive, transitional cell carcinoma of right renal pelvis, stage IV ([id number])  # History of adjuvant carboplatin/gemcitabine chemotherapy  # Permanent atrial fibrillation, on chronic anticoagulation  # Tricuspid regurgitation, aortic regurgitation  # Possible pulmonary hypertension     Ms. Dimple Oviedo is an 89 year old woman with a history of CKD II, muscle-invasive transitional cell carcinoma of the right renal pelvis, permanent atrial fibrillation on chronic anticoagulation. She was diagnosed with DLBCL, non-GC immunophenotype, on 6/3/25. Studies are negative for DHL/THL disease. PET/CT shows localized pleural multifocal involvement only; note that one of the hypermetabolic lesions was present in 2023, and may be related to her TCC instead. She has a thyroid nodule that is additionally hypermetabolic, but is stable and has been investigated by U/S previously. I would consider her to have stage IE DLBCL based on limited involvement of a single extralymphatic site. IPI1 (good risk).     She has a new, mild anemia -- defer BMBx for now given negative PET findings. Her iron saturation index is slightly low -- she may have slight iron deficiency in the setting of chronic anticoagulation.     By the Paul-Schonberg index, her 5-year mortality is estimated at 60%, her 10-year mortality is estimated at 93%. By CARG score is 8, implying a 59% change of severe toxicity from poly-chemotherapy. However, the life expectancy of patients with DLBCL who do not receive therapy is generally measured in months; therefore I counseled her that while chemotherapy is high risk,pursuing treatment  may still be consistent with her goals of care, if she wishes to maximize her chance of cure.     We previously discussed immunochemotherapy in detail, but I again reviewed mini-R-CHOP with her today. This regimen is recommended for elderly patients by current NCCN guidelines. In prior trials, elderly patients received 6 cycles of this regimen for any stage, if tolerated well (The Lancet Oncology Volume 12, Issue 5, May 2011, Pages 460-468).  Toxicity was predominantly infectious and related to neutropenia. Deaths due to complications from chemotherapy were reported. As an alternative, I also offered localized radiation instead of systemic therapy, which might have a lower likelihood of cure, but a reasonable chance of temporary local disease control; she declined this, preferring to pursue systemic therapy. I explained that there is a drug interaction between Diltiazem and both Doxorubicin and Vincristine; since she has a low burden of disease, there is no immediate urgent need to start treatment. I think we should have her see Cardiology for rotation of Diltiazem to another agent for rate control, for diuretic recommendations, and also to comment on her appropriateness for anthracycline-containing chemotherapy. I offered her chemoport placement today ahead of starting therapy, but she declined.     PLAN:  --Refer to Cardiology for medical optimization ahead of chemotherapy inititiation  --RTC for mini-RCHOP with G-CSF support in 2-3 weeks    Nikunj Lazo MD, PhD  Division of Hematology, Oncology, and Transplantation  [facility]

## 2025-07-09 NOTE — LETTER
7/9/2025      Dimple Oviedo  [address]  [place] [facility] [address]      Dear Colleague,    Thank you for referring your patient, Dimple Oviedo, to the [facility]. Please see a copy of my visit note below.    [facility]  Follow Up Visit  Jul 9, 2025  Hem/onc History:   HEMATOLOGIC HISTORY:   --April 2025: During regular surveillance for known transitional cell carcinoma, Ms. Oviedo was found to have interval progression of pleural-based lung nodules.  Biopsy revealed large B-cell lymphoma.   --4/23/2025 CT IMPRESSION:   Increased size of pleural-based lesions along the posterior and medial left hemithorax.   Pleura/lungs:  Left posteromedial enhancing pleural thickening measuring 2.2 x 0.6 cm (3/193) previously measured 2.1 x 7.4 cm.   Superior enhancing peripheral nodule in the left lower thorax measuring 2.2 x 1.3 cm (3/166) previously measured 1.2 x 0.6 cm.  Adjacent medial pleural nodule measuring 1.1 x 0.6 cm (3/159) is stable.  Stable calcified nodule in the posterior right lower lobe base.  6/3/25: Left lower lobe core needle biopsy; large B cell lymphoma. The neoplastic cells are positive for CD20, CD45, BCL-2, and MUM-1 but negative for CD5, CD10, CD30, c-Myc, CK AE1/AE3, HMB45, and DILIA-ELIZABETH.  The expression of c-MYC in neoplastic cells is low (less than 40%). The proliferation index by Ki-67 is approximately 20-30%. The lymphoma cells are non-germinal center B-cell type according to immunohistochemistry and the Pradeep algorithm.   FISH: No rearrangements of MYC, BCL-2, or BCL-6.   --June 2025: CBCs reflect a new, mild anemia, MCV 86. CBC w/ differential otherwise within normal limits. CMPs reflect an eGFR of ~50, close to long-term baseline; otherwise within normal limits.   6/18/25 LDH: 218 (normal range). SPEP without monoclonal protein.   Iron studies within normal limits except Iron Sat 10 (low). Vitamin B12 346 (low-normal). Folate 15.0. HBV, HCV,  HIV Serologies negative. EBV viral load undetectable.   6/25/25 PET/CT: IMPRESSION: 1.  Multifocal hypermetabolic pleural-based soft tissue foci along the dependent left lower lobe, representing biopsy proven diffuse large B-cell lymphoma. Note that one of these foci was present with hypermetabolism on 9/13/2023. 2.  Persistent intense uptake within a left lower pole thyroid nodule, not significantly changed, with significant enlarged multinodular  thyroid overall. No nodule requiring biopsy found on ultrasound dated 5/30/2024. Recommend continued attention on follow-up imaging. 3.  Increased uptake within the stomach, with small hiatal hernia,  which may represent esophagogastritis. Recommend correlation patient's symptoms. 4.  Incidental CT findings, as above.  7/1/25 TTE: Global and regional left ventricular function is normal with an EF of 55-60%. The right ventricular systolic pressure is 37mmHg above the right atrial  pressure. Mild aortic stenosis is present. Severe biatrial enlargement is present.       ONCOLOGIC HISTORY:Adapted from prior notes.   1. 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 S/p Right robotic nephroureterectomy, excision of sandip-caval mass and LN excision 11/13/2018. Pathologic stage pT4N1 (1/6 lymph nodes). Residual FDG-avid disease demonstrated on PET/CT.   --S/p Adjuvant Carboplatin/Gemcitabine chemotherapy 12/2018  --GABRIELLE of serial surveillance      2. Non-muscle invasive bladder cancer:  - 10/3/19: Cystoscopy showed a small area of erythema on retroflexion, possibly pre-existing or due to retroflexion of the scope. Urine cytology from that time showed cells suspicious for malignancy. 1/13/20: Bladder biopsy showed high grade urothelial carcinoma in-situ  - 2/12/20-3/18/20: Intravesical BCG therapy  - 7/24/20 and last cystoscopy was on the same day. It was negative. However, urine cytology was positive for  high-grade urothelial carcinoma.   - 11/25/20-12/10/2020: 3 weekly doses of intravesical BCG 50mg.   - 12/10/20: Cytology suspicious and FISH urovysion positive for TCC.    History of Present Illness:    Ms. Oviedo returns along with  her son Issa. Her daughter joins by telephone. She is feeling well. No recent infectious episodes or concerns.     Medications:       Current Outpatient Medications   Medication Sig Dispense Refill     alendronate (FOSAMAX) 70 MG tablet TAKE 1 TABLET EVERY 7 DAYS AT LEAST 60 MINUTES BEFORE BREAKFAST AS DIRECTED. 12 tablet 3     diltiazem ER (DILT-XR) 180 MG 24 hr capsule TAKE 1 CAPSULE EVERY DAY 90 capsule 3     empagliflozin (JARDIANCE) 10 MG TABS tablet Take 10 mg by mouth daily.       furosemide (LASIX) 20 MG tablet TAKE 1 TABLET TWICE DAILY IN THE MORNING AND AFTER LUNCH 180 tablet 3     irbesartan (AVAPRO) 300 MG tablet TAKE 1 TABLET EVERY DAY 90 tablet 3     lovastatin (MEVACOR) 40 MG tablet TAKE 1 TABLET AT BEDTIME (SCHEDULE ANNUAL VISIT, NEED FASTING LABS) 90 tablet 3     methimazole (TAPAZOLE) 5 MG tablet TAKE 1 TABLET ALTERNATING WITH 1/2 TABLET EVERY OTHER DAY 66 tablet 3     Omega-3 Fatty Acids (FISH OIL) 500 MG CAPS Take 1 capsule by mouth every morning        omeprazole (PRILOSEC) 20 MG DR capsule TAKE 1 CAPSULE EVERY DAY 90 capsule 3     propranolol ER (INDERAL LA) 60 MG 24 hr capsule TAKE 1 CAPSULE EVERY DAY 90 capsule 0     RESTASIS 0.05 % ophthalmic emulsion        rOPINIRole (REQUIP) 0.25 MG tablet TAKE 1 TABLET IN THE AFTERNOON AND 2 TABLETS AT NIGHT 270 tablet 0     sertraline (ZOLOFT) 100 MG tablet TAKE 1 TABLET EVERY MORNING 90 tablet 0     traZODone (DESYREL) 50 MG tablet TAKE 1/2 TABLET AT BEDTIME 45 tablet 0     XARELTO ANTICOAGULANT 15 MG TABS tablet TAKE 1 TABLET (15 MG) BY MOUTH DAILY (WITH DINNER) 90 tablet 3     No current facility-administered medications for this visit.        Physical Exam:   /64 (BP Location: Right arm, Patient Position:  Sitting, Cuff Size: Adult Large)   Pulse 72   Temp 97.4  F (36.3  C) (Oral)   Resp 28   Wt 76.6 kg (168 lb 14.4 oz)   SpO2 96%   BMI 36.55 kg/m      ECOG PS: 2  General: NAD; H&N: no mucosal lesions; Lungs clear; Heart RRR; Abdomen; Soft, No organomegaly; Extremities: No edema; Skin: No rash; Neuro: Nonfocal; Mood/Affect: appropriate; Lymph: no LAD  Assessment and Plan:   # EBV+ Large B-cell lymphoma, Non-GC, staging/workup underway  # CKD II, eGFR 50  # Asthma  # Arthritis  # GERD  # Osteopenia  # Polymylagia rheumatica  # Stress-induced cardiomyopathy  # Hypertension  # History of high-grade, muscle-invasive, transitional cell carcinoma of right renal pelvis, stage IV ([id number])  # History of adjuvant carboplatin/gemcitabine chemotherapy  # Permanent atrial fibrillation, on chronic anticoagulation  # Tricuspid regurgitation, aortic regurgitation  # Possible pulmonary hypertension     Ms. Dimple Oviedo is an 89 year old woman with a history of CKD II, muscle-invasive transitional cell carcinoma of the right renal pelvis, permanent atrial fibrillation on chronic anticoagulation. She was diagnosed with DLBCL, non-GC immunophenotype, on 6/3/25. Studies are negative for DHL/THL disease. PET/CT shows localized pleural multifocal involvement only; note that one of the hypermetabolic lesions was present in 2023, and may be related to her TCC instead. She has a thyroid nodule that is additionally hypermetabolic, but is stable and has been investigated by U/S previously. I would consider her to have stage IE DLBCL based on limited involvement of a single extralymphatic site. IPI1 (good risk).     She has a new, mild anemia -- defer BMBx for now given negative PET findings. Her iron saturation index is slightly low -- she may have slight iron deficiency in the setting of chronic anticoagulation.     We previously discussed immunochemotherapy in detail, but I again reviewed mini-R-CHOP with her today. This regimen is  recommended for elderly patients by current NCCN guidelines. In prior trials, elderly patients received 6 cycles of this regimen for any stage, if tolerated well (The Lancet Oncology Volume 12, Issue 5, May 2011, Pages 460-468).  Toxicity was predominantly infectious and related to neutropenia. Deaths due to complications from chemotherapy were reported. I think it is reasonable to pursue R-mini-CHOP in this scenario, though the risks of treatment are elevated. As an alternative, I also offered localized radiation instead of systemic therapy, which might have a lower likelihood of cure, but a reasonable chance of temporary local disease control; she declined this, preferring to pursue systemic therapy. I explained that there is a drug interaction between Diltiazem and both Doxorubicin and Vincristine; since she has a low burden of disease, there is no real urgency to start treatment. I think we should have her see Cardiology for rotation of Diltiazem to another agent for rate control, for diuretic recommendations, and also to comment on her appropriateness for anthracycline-containing chemotherapy. I offered her chemoport placement today ahead of starting therapy, but she declined.     PLAN:  --Refer to Cardiology for medical optimization ahead of chemotherapy inititiation  --RTC for mini-RCHOP with G-CSF support in 2-3 weeks    Nikunj Lazo MD, PhD  Division of Hematology, Oncology, and Transplantation  [facility]    Again, thank you for allowing me to participate in the care of your patient.        Sincerely,        Nikunj Lazo MD    Electronically signed

## 2025-07-10 ENCOUNTER — TELEPHONE (OUTPATIENT)
Dept: CARDIOLOGY | Facility: CLINIC | Age: OVER 89
End: 2025-07-10
Payer: MEDICARE

## 2025-07-10 NOTE — TELEPHONE ENCOUNTER
This encounter is being sent to inform the clinic that this patient has a referral from  Nikunj Lazo MD  for the diagnoses of Diffuse large B-cell lymphoma of lymph nodes of multiple regions (H) [C83.38]  and has requested that this patient be seen within 1-2 weeksa and/or with Dr. Sagastume.  Based on the availability of our provider(s), we are unable to accommodate this request.    Were all sites offered this patient?  Yes    Does scheduling algorithm request to schedule next available?  Patient appointment has not been scheduled.  Please review the referral request for accommodation and contact the patient.  If unable to accommodate, please resubmit a referral and indicate a preferred partner or affiliate location using Provider Finder or Scheduling Instructions field.     Patient needs appointment in 1-2 weeks per referral. Dr. Sagastume is her provider and next available is in January. Please review and reach out to patient to schedule. Thanks

## 2025-07-14 ENCOUNTER — OFFICE VISIT (OUTPATIENT)
Dept: CARDIOLOGY | Facility: CLINIC | Age: OVER 89
End: 2025-07-14
Attending: INTERNAL MEDICINE
Payer: MEDICARE

## 2025-07-14 VITALS
OXYGEN SATURATION: 93 % | WEIGHT: 169.2 LBS | SYSTOLIC BLOOD PRESSURE: 134 MMHG | BODY MASS INDEX: 36.61 KG/M2 | DIASTOLIC BLOOD PRESSURE: 73 MMHG | HEART RATE: 79 BPM

## 2025-07-14 DIAGNOSIS — I50.30 HEART FAILURE WITH PRESERVED EJECTION FRACTION, NYHA CLASS II (H): ICD-10-CM

## 2025-07-14 DIAGNOSIS — C83.38 DIFFUSE LARGE B-CELL LYMPHOMA OF LYMPH NODES OF MULTIPLE REGIONS (H): ICD-10-CM

## 2025-07-14 DIAGNOSIS — I87.303 STASIS EDEMA OF BOTH LOWER EXTREMITIES: ICD-10-CM

## 2025-07-14 DIAGNOSIS — I48.20 CHRONIC ATRIAL FIBRILLATION (H): Primary | ICD-10-CM

## 2025-07-14 LAB
ATRIAL RATE - MUSE: 326 BPM
DIASTOLIC BLOOD PRESSURE - MUSE: NORMAL MMHG
INTERPRETATION ECG - MUSE: NORMAL
P AXIS - MUSE: NORMAL DEGREES
PR INTERVAL - MUSE: NORMAL MS
QRS DURATION - MUSE: 110 MS
QT - MUSE: 388 MS
QTC - MUSE: 430 MS
R AXIS - MUSE: -51 DEGREES
SYSTOLIC BLOOD PRESSURE - MUSE: NORMAL MMHG
T AXIS - MUSE: 57 DEGREES
VENTRICULAR RATE- MUSE: 74 BPM

## 2025-07-14 PROCEDURE — 1126F AMNT PAIN NOTED NONE PRSNT: CPT | Performed by: INTERNAL MEDICINE

## 2025-07-14 PROCEDURE — 3078F DIAST BP <80 MM HG: CPT | Performed by: INTERNAL MEDICINE

## 2025-07-14 PROCEDURE — 99214 OFFICE O/P EST MOD 30 MIN: CPT | Performed by: INTERNAL MEDICINE

## 2025-07-14 PROCEDURE — G0463 HOSPITAL OUTPT CLINIC VISIT: HCPCS | Performed by: INTERNAL MEDICINE

## 2025-07-14 PROCEDURE — 3075F SYST BP GE 130 - 139MM HG: CPT | Performed by: INTERNAL MEDICINE

## 2025-07-14 PROCEDURE — 93005 ELECTROCARDIOGRAM TRACING: CPT

## 2025-07-14 RX ORDER — METOPROLOL SUCCINATE 100 MG/1
100 TABLET, EXTENDED RELEASE ORAL DAILY
Qty: 90 TABLET | Refills: 3 | Status: SHIPPED | OUTPATIENT
Start: 2025-07-14

## 2025-07-14 ASSESSMENT — PAIN SCALES - GENERAL: PAINLEVEL_OUTOF10: NO PAIN (0)

## 2025-07-14 NOTE — PROGRESS NOTES
History:    Dimple Oviedo is a very pleasant 89 year old woman who presents for HFpEF.     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   Melanoma  Lymphedema both legs  Coronary angiogram in 2017 - no significant disease    Patient is on chronic anticoagulation and takes Xarelto 15 mg daily for CHADSVASC= 4 ++age, +female, +htn 4,  (CrCl 46 mL/min). She is on rate control strategy with propanolol and diltiazem.      She was started on Jardiance at her last visit in December 2024 for HFpEF. She has bilateral edema from lymphedema. She takes low dose lasix. She takes irbesartan for hypertension.     She has normal biventricular function with severe biatrial enlargement with mild pulmonary hypertension, PA systolic pressure estimated at 40 mmHg.     She has been recently been diagnosed large B-cell lymphoma. Due to diltiazem interaction with chemotherapy she was advised follow up for CV optimization. Overall, she feels well and is interested in quickly addressing the lymphoma. Denies chest pain,  orthopnea, paroxysmal nocturnal dyspnea, palpitations or syncope.    ROS: A comprehensive 10-point review of system is negative except for those reported in the HPI.    Current Outpatient Medications   Medication Sig Dispense Refill    alendronate (FOSAMAX) 70 MG tablet TAKE 1 TABLET EVERY 7 DAYS AT LEAST 60 MINUTES BEFORE BREAKFAST AS DIRECTED. 12 tablet 3    diltiazem ER (DILT-XR) 180 MG 24 hr capsule TAKE 1 CAPSULE EVERY DAY 90 capsule 3    empagliflozin (JARDIANCE) 10 MG TABS tablet Take 10 mg by mouth daily.      furosemide (LASIX) 20 MG tablet TAKE 1 TABLET TWICE DAILY IN THE MORNING AND AFTER LUNCH 180 tablet 3    irbesartan (AVAPRO) 300 MG tablet TAKE 1 TABLET EVERY  DAY 90 tablet 3    lovastatin (MEVACOR) 40 MG tablet TAKE 1 TABLET AT BEDTIME (SCHEDULE ANNUAL VISIT, NEED FASTING LABS) 90 tablet 3    methimazole (TAPAZOLE) 5 MG tablet TAKE 1 TABLET ALTERNATING WITH 1/2 TABLET EVERY OTHER DAY 66 tablet 3    Omega-3 Fatty Acids (FISH OIL) 500 MG CAPS Take 1 capsule by mouth every morning       omeprazole (PRILOSEC) 20 MG DR capsule TAKE 1 CAPSULE EVERY DAY 90 capsule 3    propranolol ER (INDERAL LA) 60 MG 24 hr capsule TAKE 1 CAPSULE EVERY DAY 90 capsule 0    RESTASIS 0.05 % ophthalmic emulsion       rOPINIRole (REQUIP) 0.25 MG tablet TAKE 1 TABLET IN THE AFTERNOON AND 2 TABLETS AT NIGHT 270 tablet 0    sertraline (ZOLOFT) 100 MG tablet TAKE 1 TABLET EVERY MORNING 90 tablet 0    traZODone (DESYREL) 50 MG tablet TAKE 1/2 TABLET AT BEDTIME 45 tablet 0    XARELTO ANTICOAGULANT 15 MG TABS tablet TAKE 1 TABLET (15 MG) BY MOUTH DAILY (WITH DINNER) 90 tablet 3       Allergies - reviewed   No Known Allergies    Past history -reviewed  Past Medical History:   Diagnosis Date    CRESENCIO (acute kidney injury) 9/11/2018    Arthritis     Asthma 2/9/2022    Calculus of kidney     Chemotherapy-induced neutropenia 3/6/2019    Congestive heart failure (H)     Esophageal reflux     GERD (gastroesophageal reflux disease)     Hyperlipidemia LDL goal <130 5/9/2010    Malignant melanoma of skin of trunk, except scrotum (H)     Nonspecific abnormal finding     has living will 2004 -     Nontoxic multinodular goiter     no further eval /tx rec per pt    Osteopenia     Other psoriasis     Personal history of colonic polyps     PMR (polymyalgia rheumatica)     Restless legs syndrome 10/12/2005    Stress-induced cardiomyopathy     Undiagnosed cardiac murmurs     Unspecified constipation     Unspecified essential hypertension     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      HEENT: Sclerae white, not injected.    Neck: No JVD. No thyromegaly  Heart: irregular. 2/6 systolic murmur RSB, no rub,or gallop.    Lungs: Clear bilaterally.  No rhonchi, wheezes, rales.   Extremities: swollen legs (non pitting).  The pulses are 2+at the radial bilaterally.      I have independently reviewed this patient's relevant laboratory and cardiac data :    Recent Labs   Lab Test 04/23/25  1005 11/08/23  1431   CHOL 136 150   HDL 46* 46*   LDL 62 76   TRIG 139 140      Recent Labs   Lab Test 06/25/25  1330 06/18/25  1032   AST 35 27     Recent Labs   Lab Test 06/25/25  1330 06/18/25  1032   ALT 28 27     Recent Labs   Lab Test 06/25/25  1330 06/18/25  1032    139   POTASSIUM 4.2 4.1   CHLORIDE 104 101   CO2 28 27   ANIONGAP 10 11   BUN 22.3 20.0   CR 0.99* 0.99*     Recent Labs   Lab Test 06/25/25  1330 06/18/25  1032   WBC 7.2 7.7   RBC 4.36 4.42   HGB 11.0* 11.2*   MCV 84 85    219     Recent Labs   Lab Test 11/08/23  1431 12/14/17  1152   A1C 6.1* 6.2*     Recent Labs   Lab Test 12/13/24  1038 05/28/24  1419   TSH 3.12 3.17     ECG 7/14/25  Atrial fibrillation at 75     Echo: 12/07/2023   Global and regional left ventricular function is normal with an EF of 55-60%.  Global right ventricular function is normal.  Mild aortic insufficiency.  Moderate tricuspid insufficiency.  Elevated pulmonary artery systolic pressure, 48 mmHg.  Dilation of the inferior vena cava.  No change in ventricular function; AI and TR are worse and estimated PA pressure is higher.    Coron[facility] Angiogram/[facility]: 12/13/17  No obstructive disease    Assessment and Plan:  89 year old patient with    Large B-cell lymphoma  Essential hypertension  Dyslipidemia  Normal biventricular function   Permanent atrial  fibrillation  HFpEF    Dimple Oviedo has been diagnosed with lymphoma and has been recommended mini-R-CHOP. There is a potential drug interaction between Diltiazem and both Doxorubicin and Vincristine     She is on medical therapy for heart failure with preserved EF.  I would place her at NYHA functional Class 3, AHA/ACC Stage C. Will continue SGLT2i (Class I/2a) for HFpEF. Patient is  overall euvolemic on exam today with a normal blood pressure. Son who has accompanied her to the visit, feels that she is stable and doing well, so will defer MRA     Patient is in rate controlled atrial fibrillation today. She takes diltiazem and propranolol. Will stop both and start metoprolol XL. Patient has normal biventricular function with moderate TR and pulmonary hypertension.     She was advised to contact us in the event she developed signs of HF.     Recommendations:   Stop diltiazem and propranolol  Start metoprolol  mg daily  Follow up after 2 cycles of chemotherapy (virtual OK)      Anahi Sagastume MD, MS  Professor of Medicine  Cardiovascular Medicine

## 2025-07-14 NOTE — TELEPHONE ENCOUNTER
Spoke with patient's sone and scheduled [facility] appointment for 7/14 at 11:30am.    Jose Barker   [facility]- Cardiology   [address] [place] [id number]  Hours: 8:00am-4:30pm

## 2025-07-14 NOTE — LETTER
7/14/2025      RE: Dimple Oviedo  [address]  [facility] [address]       Dear Colleague,    Thank you for the opportunity to participate in the care of your patient, Dimple Oviedo, at the [facility] [place] [facility]. Please see a copy of my visit note below.      History:    Dimple Oviedo is a very pleasant 89 year old woman who presents for HFpEF.     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   Melanoma  Lymphedema both legs  Coronary angiogram in 2017 - no significant disease    Patient is on chronic anticoagulation and takes Xarelto 15 mg daily for CHADSVASC= 4 ++age, +female, +htn 4,  (CrCl 46 mL/min). She is on rate control strategy with propanolol and diltiazem.      She was started on Jardiance at her last visit in December 2024 for HFpEF. She has bilateral edema from lymphedema. She takes low dose lasix. She takes irbesartan for hypertension.     She has normal biventricular function with severe biatrial enlargement with mild pulmonary hypertension, PA systolic pressure estimated at 40 mmHg.     She has been recently been diagnosed large B-cell lymphoma. Due to diltiazem interaction with chemotherapy she was advised follow up for CV optimization. Overall, she feels well and is interested in quickly addressing the lymphoma. Denies chest pain,  orthopnea, paroxysmal nocturnal dyspnea, palpitations or syncope.    ROS: A comprehensive 10-point review of system is negative except for those reported in the HPI.    Current Outpatient Medications   Medication Sig Dispense Refill     alendronate (FOSAMAX) 70 MG tablet TAKE 1 TABLET EVERY 7 DAYS AT LEAST 60 MINUTES BEFORE  BREAKFAST AS DIRECTED. 12 tablet 3     diltiazem ER (DILT-XR) 180 MG 24 hr capsule TAKE 1 CAPSULE EVERY DAY 90 capsule 3     empagliflozin (JARDIANCE) 10 MG TABS tablet Take 10 mg by mouth daily.       furosemide (LASIX) 20 MG tablet TAKE 1 TABLET TWICE DAILY IN THE MORNING AND AFTER LUNCH 180 tablet 3     irbesartan (AVAPRO) 300 MG tablet TAKE 1 TABLET EVERY DAY 90 tablet 3     lovastatin (MEVACOR) 40 MG tablet TAKE 1 TABLET AT BEDTIME (SCHEDULE ANNUAL VISIT, NEED FASTING LABS) 90 tablet 3     methimazole (TAPAZOLE) 5 MG tablet TAKE 1 TABLET ALTERNATING WITH 1/2 TABLET EVERY OTHER DAY 66 tablet 3     Omega-3 Fatty Acids (FISH OIL) 500 MG CAPS Take 1 capsule by mouth every morning        omeprazole (PRILOSEC) 20 MG DR capsule TAKE 1 CAPSULE EVERY DAY 90 capsule 3     propranolol ER (INDERAL LA) 60 MG 24 hr capsule TAKE 1 CAPSULE EVERY DAY 90 capsule 0     RESTASIS 0.05 % ophthalmic emulsion        rOPINIRole (REQUIP) 0.25 MG tablet TAKE 1 TABLET IN THE AFTERNOON AND 2 TABLETS AT NIGHT 270 tablet 0     sertraline (ZOLOFT) 100 MG tablet TAKE 1 TABLET EVERY MORNING 90 tablet 0     traZODone (DESYREL) 50 MG tablet TAKE 1/2 TABLET AT BEDTIME 45 tablet 0     XARELTO ANTICOAGULANT 15 MG TABS tablet TAKE 1 TABLET (15 MG) BY MOUTH DAILY (WITH DINNER) 90 tablet 3       Allergies - reviewed   No Known Allergies    Past history -reviewed  Past Medical History:   Diagnosis Date     CRESENCIO (acute kidney injury) 9/11/2018     Arthritis      Asthma 2/9/2022     Calculus of kidney      Chemotherapy-induced neutropenia 3/6/2019     Congestive heart failure (H)      Esophageal reflux      GERD (gastroesophageal reflux disease)      Hyperlipidemia LDL goal <130 5/9/2010     Malignant melanoma of skin of trunk, except scrotum (H)      Nonspecific abnormal finding     has living will 2004 -      Nontoxic multinodular goiter     no further eval /tx rec per pt     Osteopenia      Other psoriasis      Personal history of colonic polyps       PMR (polymyalgia rheumatica)      Restless legs syndrome 10/12/2005     Stress-induced cardiomyopathy      Undiagnosed cardiac murmurs      Unspecified constipation      Unspecified essential hypertension      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        Social history - reviewed  Non smoker and no alcohol       Family history -reviewed  No premature CAD or sudden death    Exam:     /73 (BP Location: Right arm, Patient Position: Chair, Cuff Size: Adult Regular)   Pulse 79   Wt 76.7 kg (169 lb 3.2 oz)   SpO2 93%   BMI 36.61 kg/m    In general, the patient is in no apparent distress.      HEENT: Sclerae white, not injected.    Neck: No JVD. No thyromegaly  Heart: irregular. 2/6 systolic murmur RSB, no rub,or gallop.    Lungs: Clear bilaterally.  No rhonchi, wheezes, rales.   Extremities: swollen legs (non pitting).  The pulses are 2+at the radial bilaterally.      I have independently reviewed this patient's relevant laboratory and cardiac data :    Recent Labs   Lab Test 04/23/25  1005 11/08/23  1431   CHOL 136 150   HDL 46* 46*   LDL 62 76   TRIG 139 140      Recent Labs   Lab Test 06/25/25  1330 06/18/25  1032   AST 35 27     Recent Labs   Lab Test 06/25/25  1330 06/18/25  1032   ALT 28 27     Recent Labs   Lab Test 06/25/25  1330 06/18/25  1032    139   POTASSIUM 4.2 4.1   CHLORIDE 104 101   CO2 28 27   ANIONGAP 10 11   BUN 22.3 20.0   CR 0.99* 0.99*     Recent Labs   Lab Test 06/25/25  1330 06/18/25  1032   WBC 7.2 7.7   RBC 4.36 4.42   HGB 11.0* 11.2*   MCV 84 85    219     Recent Labs   Lab Test 11/08/23  1431 12/14/17  1152   A1C 6.1* 6.2*     Recent Labs   Lab Test 12/13/24  1038 05/28/24  1419   TSH 3.12 3.17     ECG 7/14/25  Atrial fibrillation at 75     Echo: 12/07/2023   Global and regional left ventricular function is normal with an EF of 55-60%.  Global right ventricular function is normal.  Mild aortic insufficiency.  Moderate tricuspid insufficiency.  Elevated pulmonary artery  systolic pressure, 48 mmHg.  Dilation of the inferior vena cava.  No change in ventricular function; AI and TR are worse and estimated PA pressure is higher.    [facility]: 12/13/17  No obstructive disease    Assessment and Plan:  89 year old patient with    Large B-cell lymphoma  Essential hypertension  Dyslipidemia  Normal biventricular function   Permanent atrial fibrillation  HFpEF    Dimple Oviedo has been diagnosed with lymphoma and has been recommended mini-R-CHOP. There is a potential drug interaction between Diltiazem and both Doxorubicin and Vincristine     She is on medical therapy for heart failure with preserved EF.  I would place her at NYHA functional Class 3, AHA/ACC Stage C. Will continue SGLT2i (Class I/2a) for HFpEF. Patient is  overall euvolemic on exam today with a normal blood pressure. Son who has accompanied her to the visit, feels that she is stable and doing well, so will defer MRA     Patient is in rate controlled atrial fibrillation today. She takes diltiazem and propranolol. Will stop both and start metoprolol XL. Patient has normal biventricular function with moderate TR and pulmonary hypertension.     She was advised to contact us in the event she developed signs of HF.     Recommendations:   Stop diltiazem and propranolol  Start metoprolol  mg daily  Follow up after 2 cycles of chemotherapy (virtual OK)      Anahi Sagastume MD, MS  Professor of Medicine  Cardiovascular Medicine    Please do not hesitate to contact me if you have any questions/concerns.     Sincerely,     Anahi Sagastume MD

## 2025-07-14 NOTE — PATIENT INSTRUCTIONS
Plan:    STOP diltiazem   STOP propranolol   START metoprolol 100 mg once daily   Video visit with  after two cycles of chemo      Your Care Team:         Cardiology   Telephone Number     Shantell Matamoros RN [phone]    After business hours: [phone], ask for cardiologist on-call           On-call cardiologist for after hours or on weekends:    [phone], option #4, and ask to speak to the on-call cardiologist.    Cardiovascular Clinic:   [address] [place] [address]. [place] [address]      As always, thank you for trusting us with your health care needs!    If you have further questions, please utilize [facility] to contact us.

## 2025-07-14 NOTE — NURSING NOTE
Chief Complaint   Patient presents with    Follow Up     RETURN CARDIO-ONCOLOGY     Vitals were taken, medications reconciled, and EKG was performed.    Nancy Davidson EMT  11:42 AM

## 2025-07-15 DIAGNOSIS — I10 HYPERTENSION GOAL BP (BLOOD PRESSURE) < 140/90: ICD-10-CM

## 2025-07-15 RX ORDER — IRBESARTAN 300 MG/1
300 TABLET ORAL DAILY
Qty: 90 TABLET | Refills: 0 | Status: SHIPPED | OUTPATIENT
Start: 2025-07-15

## 2025-07-15 NOTE — TELEPHONE ENCOUNTER
Call received from Day, patient's daughter-in-law.  Consent to communicate on file.    Per Day:  Patient is in need of refills for Irbesartan, Xarelto and Methimazole  Patient has 7 day supply of Irbesartan (patient is taking consistently)    Pharmacy confirmed with Day and pended.    Writer informed Day:  One year supply of Xarelto was ordered on 1/13/25-please request refill through pharmacy  Methimazole managed by Endocrinology; please request refill through Endocrinology  Will forward Irbesartan refill request high priority for processing  If patient will be out of this medication before mail order delivery, please let PCP clinic know and we can send in       Day verbalized understanding and in agreement with plan.    SHASHANK MurrellN, RN-[facility]      
NEEDS labs for future refill.   
Blood glucose

## 2025-07-16 RX ORDER — IRBESARTAN 300 MG/1
300 TABLET ORAL DAILY
Qty: 90 TABLET | Refills: 3 | OUTPATIENT
Start: 2025-07-16

## 2025-07-18 ENCOUNTER — TELEPHONE (OUTPATIENT)
Dept: CARDIOLOGY | Facility: CLINIC | Age: OVER 89
End: 2025-07-18
Payer: MEDICARE

## 2025-07-18 DIAGNOSIS — I50.30 HEART FAILURE WITH PRESERVED EJECTION FRACTION, NYHA CLASS II (H): ICD-10-CM

## 2025-07-18 DIAGNOSIS — I50.22 CHRONIC SYSTOLIC HEART FAILURE (H): ICD-10-CM

## 2025-07-18 DIAGNOSIS — I48.20 CHRONIC ATRIAL FIBRILLATION (H): Primary | ICD-10-CM

## 2025-07-18 NOTE — TELEPHONE ENCOUNTER
[facility] Call Center    Phone Message    May a detailed message be left on voicemail: yes     Reason for Call: Other: patient son is calling to see if there is any way they can get a prescription for empagliflozin (JARDIANCE) 10 MG TABS tablet as the patient is moving she had lost her medication, please advise,thank you      Action Taken: Other: cardiology     Travel Screening: Not Applicable     Date of Service:

## 2025-07-21 NOTE — TELEPHONE ENCOUNTER
Writer returned call to patients son informing him that jardiance refill had been sent to pharmacy on file.     Shantell Matamoros, SHASHANKN, RN  RN Care Coordinator - General Cardiology   [facility]

## 2025-07-22 ENCOUNTER — OFFICE VISIT (OUTPATIENT)
Dept: FAMILY MEDICINE | Facility: CLINIC | Age: OVER 89
End: 2025-07-22
Attending: FAMILY MEDICINE
Payer: MEDICARE

## 2025-07-22 ENCOUNTER — TELEPHONE (OUTPATIENT)
Dept: CARDIOLOGY | Facility: CLINIC | Age: OVER 89
End: 2025-07-22

## 2025-07-22 VITALS
DIASTOLIC BLOOD PRESSURE: 70 MMHG | TEMPERATURE: 96.9 F | RESPIRATION RATE: 21 BRPM | WEIGHT: 167.7 LBS | HEIGHT: 57 IN | SYSTOLIC BLOOD PRESSURE: 135 MMHG | BODY MASS INDEX: 36.18 KG/M2 | OXYGEN SATURATION: 96 % | HEART RATE: 85 BPM

## 2025-07-22 DIAGNOSIS — N18.31 CHRONIC KIDNEY DISEASE, STAGE 3A (H): ICD-10-CM

## 2025-07-22 DIAGNOSIS — G47.00 INSOMNIA, UNSPECIFIED TYPE: ICD-10-CM

## 2025-07-22 DIAGNOSIS — C83.38 DIFFUSE LARGE B-CELL LYMPHOMA OF LYMPH NODES OF MULTIPLE REGIONS (H): ICD-10-CM

## 2025-07-22 DIAGNOSIS — G25.81 RESTLESS LEGS SYNDROME: ICD-10-CM

## 2025-07-22 DIAGNOSIS — M85.80 OSTEOPENIA, UNSPECIFIED LOCATION: ICD-10-CM

## 2025-07-22 DIAGNOSIS — E66.01 MORBID OBESITY (H): ICD-10-CM

## 2025-07-22 DIAGNOSIS — E78.5 HYPERLIPIDEMIA LDL GOAL <130: ICD-10-CM

## 2025-07-22 DIAGNOSIS — F41.1 GAD (GENERALIZED ANXIETY DISORDER): ICD-10-CM

## 2025-07-22 DIAGNOSIS — I10 HYPERTENSION GOAL BP (BLOOD PRESSURE) < 140/90: ICD-10-CM

## 2025-07-22 DIAGNOSIS — Z00.00 ENCOUNTER FOR MEDICARE ANNUAL WELLNESS EXAM: Primary | ICD-10-CM

## 2025-07-22 DIAGNOSIS — I48.91 ATRIAL FIBRILLATION WITH RAPID VENTRICULAR RESPONSE (H): ICD-10-CM

## 2025-07-22 PROBLEM — N18.30 CHRONIC KIDNEY DISEASE, STAGE 3 (H): Status: RESOLVED | Noted: 2021-02-23 | Resolved: 2025-07-22

## 2025-07-22 PROCEDURE — 1126F AMNT PAIN NOTED NONE PRSNT: CPT | Performed by: FAMILY MEDICINE

## 2025-07-22 PROCEDURE — G2211 COMPLEX E/M VISIT ADD ON: HCPCS | Performed by: FAMILY MEDICINE

## 2025-07-22 PROCEDURE — G0439 PPPS, SUBSEQ VISIT: HCPCS | Performed by: FAMILY MEDICINE

## 2025-07-22 PROCEDURE — 3078F DIAST BP <80 MM HG: CPT | Performed by: FAMILY MEDICINE

## 2025-07-22 PROCEDURE — 3075F SYST BP GE 130 - 139MM HG: CPT | Performed by: FAMILY MEDICINE

## 2025-07-22 PROCEDURE — 99214 OFFICE O/P EST MOD 30 MIN: CPT | Mod: 25 | Performed by: FAMILY MEDICINE

## 2025-07-22 RX ORDER — TRAZODONE HYDROCHLORIDE 50 MG/1
TABLET ORAL
Qty: 45 TABLET | Refills: 3 | Status: SHIPPED | OUTPATIENT
Start: 2025-07-22

## 2025-07-22 RX ORDER — ALENDRONATE SODIUM 70 MG/1
TABLET ORAL
Qty: 12 TABLET | Refills: 3 | Status: SHIPPED | OUTPATIENT
Start: 2025-07-22

## 2025-07-22 RX ORDER — SERTRALINE HYDROCHLORIDE 100 MG/1
100 TABLET, FILM COATED ORAL EVERY MORNING
Qty: 90 TABLET | Refills: 3 | Status: SHIPPED | OUTPATIENT
Start: 2025-07-22

## 2025-07-22 RX ORDER — LOVASTATIN 40 MG/1
TABLET ORAL
Qty: 90 TABLET | Refills: 3 | Status: SHIPPED | OUTPATIENT
Start: 2025-07-22

## 2025-07-22 RX ORDER — ROPINIROLE 0.25 MG/1
TABLET, FILM COATED ORAL
Qty: 270 TABLET | Refills: 3 | Status: SHIPPED | OUTPATIENT
Start: 2025-07-22

## 2025-07-22 RX ORDER — IRBESARTAN 300 MG/1
300 TABLET ORAL DAILY
Qty: 90 TABLET | Refills: 3 | Status: SHIPPED | OUTPATIENT
Start: 2025-07-22

## 2025-07-22 SDOH — HEALTH STABILITY: PHYSICAL HEALTH: ON AVERAGE, HOW MANY DAYS PER WEEK DO YOU ENGAGE IN MODERATE TO STRENUOUS EXERCISE (LIKE A BRISK WALK)?: 0 DAYS

## 2025-07-22 ASSESSMENT — PATIENT HEALTH QUESTIONNAIRE - PHQ9
10. IF YOU CHECKED OFF ANY PROBLEMS, HOW DIFFICULT HAVE THESE PROBLEMS MADE IT FOR YOU TO DO YOUR WORK, TAKE CARE OF THINGS AT HOME, OR GET ALONG WITH OTHER PEOPLE: NOT DIFFICULT AT ALL
SUM OF ALL RESPONSES TO PHQ QUESTIONS 1-9: 1
SUM OF ALL RESPONSES TO PHQ QUESTIONS 1-9: 1

## 2025-07-22 ASSESSMENT — ANXIETY QUESTIONNAIRES
1. FEELING NERVOUS, ANXIOUS, OR ON EDGE: SEVERAL DAYS
8. IF YOU CHECKED OFF ANY PROBLEMS, HOW DIFFICULT HAVE THESE MADE IT FOR YOU TO DO YOUR WORK, TAKE CARE OF THINGS AT HOME, OR GET ALONG WITH OTHER PEOPLE?: NOT DIFFICULT AT ALL
3. WORRYING TOO MUCH ABOUT DIFFERENT THINGS: NEARLY EVERY DAY
5. BEING SO RESTLESS THAT IT IS HARD TO SIT STILL: SEVERAL DAYS
6. BECOMING EASILY ANNOYED OR IRRITABLE: SEVERAL DAYS
2. NOT BEING ABLE TO STOP OR CONTROL WORRYING: SEVERAL DAYS
GAD7 TOTAL SCORE: 9
IF YOU CHECKED OFF ANY PROBLEMS ON THIS QUESTIONNAIRE, HOW DIFFICULT HAVE THESE PROBLEMS MADE IT FOR YOU TO DO YOUR WORK, TAKE CARE OF THINGS AT HOME, OR GET ALONG WITH OTHER PEOPLE: NOT DIFFICULT AT ALL
7. FEELING AFRAID AS IF SOMETHING AWFUL MIGHT HAPPEN: SEVERAL DAYS
GAD7 TOTAL SCORE: 9
4. TROUBLE RELAXING: SEVERAL DAYS
GAD7 TOTAL SCORE: 9
7. FEELING AFRAID AS IF SOMETHING AWFUL MIGHT HAPPEN: SEVERAL DAYS

## 2025-07-22 ASSESSMENT — SOCIAL DETERMINANTS OF HEALTH (SDOH): HOW OFTEN DO YOU GET TOGETHER WITH FRIENDS OR RELATIVES?: MORE THAN THREE TIMES A WEEK

## 2025-07-22 ASSESSMENT — PAIN SCALES - GENERAL: PAINLEVEL_OUTOF10: NO PAIN (0)

## 2025-07-22 NOTE — PROGRESS NOTES
Answers submitted by the patient for this visit:  Patient Health Questionnaire (Submitted on 7/22/2025)  If you checked off any problems, how difficult have these problems made it for you to do your work, take care of things at home, or get along with other people?: Not difficult at all  PHQ9 TOTAL SCORE: 1  Patient Health Questionnaire (G7) (Submitted on 7/22/2025)  THERON 7 TOTAL SCORE: 9  Preventive Care Visit  [facility] Antonino Zapien MD, MD, Family Medicine  Jul 22, 2025      Assessment & Plan     Encounter for Medicare annual wellness exam       Osteopenia, unspecified location  No recent dexa but due to her multiple comorbidities I am fine waiting on dexa for her and continue fosamax as prescribed.   - alendronate (FOSAMAX) 70 MG tablet; TAKE 1 TABLET EVERY 7 DAYS AT LEAST 60 MINUTES BEFORE BREAKFAST AS DIRECTED.    Hypertension goal BP (blood pressure) < 140/90  Been to cardiology for afib, heart failure. Continue same rx.   - irbesartan (AVAPRO) 300 MG tablet; Take 1 tablet (300 mg) by mouth daily.    Restless legs syndrome  States it is helpful. We will continue same.   - rOPINIRole (REQUIP) 0.25 MG tablet; TAKE 1 TABLET IN THE AFTERNOON AND 2 TABLETS AT NIGHT    THERON (generalized anxiety disorder)  Patient is tolerating current medication without any major side effects of concerns and current dose seems reasonable too.  Current medication regime is effective. Continue current treatment without any changes.   - sertraline (ZOLOFT) 100 MG tablet; Take 1 tablet (100 mg) by mouth every morning.    Insomnia, unspecified type  Patient is tolerating current medication without any major side effects of concerns and current dose seems reasonable too.  Current medication regime is effective. Continue current treatment without any changes.   - traZODone (DESYREL) 50 MG tablet; TAKE 1/2 TABLET AT BEDTIME    Hyperlipidemia LDL goal <130  Patient is tolerating current medication  "without any major side effects of concerns and current dose seems reasonable too.  Current medication regime is effective. Continue current treatment without any changes.   - lovastatin (MEVACOR) 40 MG tablet; TAKE 1 TABLET AT BEDTIME (SCHEDULE ANNUAL VISIT, NEED FASTING LABS)    Morbid obesity (H)  With multiple risk factors. Mobility limitation. Continue activities as tolerated.     Chronic kidney disease, stage 3a (H)  Avoid nephrotoxic agents.     Atrial fibrillation with rapid ventricular response (H)  Been to cardiology. Recently started on metoprolol. Diltiazem and propranolol were stopped. On xarelto. All managed by cardiology.    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 History of distal ureter urothelial carcinoma and recent diagnosis of diffuse large B-cell lymphoma.  Treatment as per oncology.  Going for port in 2 days.  Immune of chemotherapy as per oncology.    BMI  Estimated body mass index is 36.29 kg/m  as calculated from the following:    Height as of this encounter: 1.448 m (4' 9\").    Weight as of this encounter: 76.1 kg (167 lb 11.2 oz).   Weight management plan: Discussed healthy diet and exercise guidelines    Counseling  Appropriate preventive services were addressed with this patient via screening, questionnaire, or discussion as appropriate for fall prevention, nutrition, physical activity, Tobacco-use cessation, social engagement, weight loss and cognition.  Checklist reviewing preventive services available has been given to the patient.  Reviewed patient's diet, addressing concerns and/or questions.   She is at risk for psychosocial distress and has been provided with information to reduce risk.   Discussed possible causes of fatigue. Updated plan of care.  Patient reported difficulty with activities of daily living were addressed today.Patient reported safety concerns were addressed today.The patient was provided with written " information regarding signs of hearing loss.   Information on urinary incontinence and treatment options given to patient.   Reviewed preventive health counseling, as reflected in patient instructions    The longitudinal plan of care for the diagnosis(es)/condition(s) as documented were addressed during this visit. Due to the added complexity in care, I will continue to support Dimple in the subsequent management and with ongoing continuity of care.  Subjective   Dimple is a 89 year old, presenting for the following:  Medicare Visit, Follow Up (Cancer DX), and Ear Problem (Possible ear wax removal, BOTH ears)           HPI  Will be having chemo.   Small lymphoma and some immunotherapy. Will be having port on Thursday.   Also been to cardiology - on metoprolol. Some short of breath. No chest pain or tightness.   Issues with hearing aids - not hearing well. Also concerned about ear wax.   No side effects from medications.   Using walker inside and out.   Son brought her here. Does not drive.              Advance Care Planning    Advance care planning document is on file but is outdated.  Patient encouraged to update or provider to update POLST.        7/22/2025   General Health   How would you rate your overall physical health? Good   Feel stress (tense, anxious, or unable to sleep) To some extent   (!) STRESS CONCERN      7/22/2025   Nutrition   Diet: Low salt         7/22/2025   Exercise   Days per week of moderate/strenous exercise 0 days   (!) EXERCISE CONCERN      7/22/2025   Social Factors   Frequency of gathering with friends or relatives More than three times a week   Worry food won't last until get money to buy more No   Food not last or not have enough money for food? No   Do you have housing? (Housing is defined as stable permanent housing and does not include staying outside in a car, in a tent, in an abandoned building, in an overnight shelter, or couch-surfing.) Yes   Are you worried about losing your  housing? No   Lack of transportation? No   Unable to get utilities (heat,electricity)? No         7/22/2025   Fall Risk   Fallen 2 or more times in the past year? No   Trouble with walking or balance? Yes   Gait Speed Test (Document in seconds) 4.57   Gait Speed Test Interpretation Less than or equal to 5.00 seconds - PASS          7/22/2025   Activities of Daily Living- Home Safety   Needs help with the following daily activites Transportation    Shopping    Housework   Do you have the help that you need? Yes for Some   Safety concerns in the home Throw rugs in the hallway       Multiple values from one day are sorted in reverse-chronological order         7/22/2025   Dental   Dentist two times every year? Yes         7/22/2025   Hearing Screening   Hearing concerns? (!) I FEEL THAT PEOPLE ARE MUMBLING OR NOT SPEAKING CLEARLY.    (!) I NEED TO ASK PEOPLE TO SPEAK UP OR REPEAT THEMSELVES.    (!) IT'S HARDER TO UNDERSTAND WOMEN'S VOICES THAN MEN'S VOICES.    (!) IT'S HARD TO FOLLOW A CONVERSATION IN A NOISY RESTAURANT OR CROWDED ROOM.    (!) TROUBLE UNDESTANDING A SPEAKER IN A PUBLIC MEETING OR [employer] SERVICE.    (!) TROUBLE FOLLOWING DIALOGUE IN THE THEATHER.    (!) TROUBLE UNDERSTANDING SOFT OR WHISPERED SPEECH.    (!) TROUBLE UNDERSTANDING SPEECH ON THE TELEPHONE   Would you like a referral for hearing testing? I already have hearing aids       Multiple values from one day are sorted in reverse-chronological order         7/22/2025   Driving Risk Screening   Patient/family members have concerns about driving No         7/22/2025   General Alertness/Fatigue Screening   Have you been more tired than usual lately? (!) YES         7/22/2025   Urinary Incontinence Screening   Bothered by leaking urine in past 6 months Yes         Today's PHQ-2 Score:       7/22/2025    12:57 PM   PHQ-2 ( 1999 Pfizer)   Q1: Little interest or pleasure in doing things 0   Q2: Feeling down, depressed or hopeless 0   PHQ-2 Score 0     Q1: Little interest or pleasure in doing things Not at all   Q2: Feeling down, depressed or hopeless Not at all   PHQ-2 Score 0       Patient-reported           7/22/2025   Substance Use   Alcohol more than 3/day or more than 7/wk No   Do you have a current opioid prescription? No   How severe/bad is pain from 1 to 10? 0/10 (No Pain)   Do you use any other substances recreationally? No     Social History     Tobacco Use    Smoking status: Never     Passive exposure: Never    Smokeless tobacco: Never   Vaping Use    Vaping status: Never Used   Substance Use Topics    Alcohol use: No     Alcohol/week: 0.0 standard drinks of alcohol     Comment: rare    Drug use: No                    Reviewed and updated as needed this visit by Provider                      Current providers sharing in care for this patient include:  Patient Care Team:  Pop Zapien MD as PCP - General (Family Medicine)  Vincenzo Tovar MD as MD (Family Medicine - Sports Medicine)  Shavon Bustamante PA-C as Physician Assistant (Physician Assistant)  Kenna La MD as MD (Urology)  Cristiana Correa RN as Registered Nurse (Urology)  Pop Zapien MD as Referring Physician (Family Practice)  Geovanna Fregoso PA as Physician Assistant (Urology)  Kenna La MD as MD (Urology)  Butch Griffith MD as MD (Clinical Cardiac Electrophysiology)  Sd Fine MD as MD (Hematology & Oncology)  Sd Fine MD as Assigned Cancer Care Provider  Ema Tang, RN as Specialty Care Coordinator (Hematology & Oncology)  Pop Zapien MD as Assigned PCP  Chandrika Camp PA-C as Physician Assistant (Cardiology)  Saira Tompkins PA-C as Physician Assistant (Physician Assistant - Medical)  Sera Campos MD as MD (Gastroenterology)  Anahi Sagastume MD as MD (Cardiovascular Disease)  Vinny Hunter, RN as Registered Nurse  Tamela Freitas DO as Assigned Gastroenterology  "Provider  Anahi Sagastume MD as Assigned Heart and Vascular Provider  Nikunj Lazo MD as MD (Hematology & Oncology)  Pattie García, RN as Specialty Care Coordinator (Hematology & Oncology)  Shantell Matamoros, ATUL as Specialty Care Coordinator (Cardiology)    The following health maintenance items are reviewed in Epic and correct as of today:  Health Maintenance   Topic Date Due    HF ACTION PLAN  Never done    MICROALBUMIN  12/18/2024    COVID-19 VACCINE (8 - Pfizer risk 2024-25 season) 04/07/2025    INFLUENZA VACCINE (1) 09/01/2025    PHQ-9  01/22/2026    LIPID  04/23/2026    ALT  06/25/2026    BMP  06/25/2026    CBC  06/25/2026    HEMOGLOBIN  06/25/2026    MEDICARE ANNUAL WELLNESS VISIT  07/22/2026    THERON ASSESSMENT  07/22/2026    ANNUAL REVIEW OF HM ORDERS  07/22/2026    FALL RISK ASSESSMENT  07/22/2026    DTAP/TDAP/TD VACCINE (2 - Td or Tdap) 09/04/2029    ADVANCE CARE PLANNING  07/22/2030    TSH W/FREE T4 REFLEX  Completed    PHQ-2 (once per calendar year)  Completed    PNEUMOCOCCAL VACCINE 50+ YEARS  Completed    URINALYSIS  Completed    ZOSTER VACCINE  Completed    RSV VACCINE  Completed    HPV VACCINE  Aged Out    MENINGITIS VACCINE  Aged Out    MAMMO SCREENING  Discontinued            Objective    Exam  /70 (BP Location: Right arm, Patient Position: Sitting, Cuff Size: Adult Regular)   Pulse 85   Temp 96.9  F (36.1  C) (Temporal)   Resp 21   Ht 1.448 m (4' 9\")   Wt 76.1 kg (167 lb 11.2 oz)   SpO2 96%   BMI 36.29 kg/m     Estimated body mass index is 36.29 kg/m  as calculated from the following:    Height as of this encounter: 1.448 m (4' 9\").    Weight as of this encounter: 76.1 kg (167 lb 11.2 oz).    Physical Exam  GENERAL: alert and no distress  EYES: Eyes grossly normal to inspection, PERRL and conjunctivae and sclerae normal  HENT: ear canals and TM's normal, left ear mild wax, both hearing aids, severe difficulty hearing,  nose and mouth without ulcers or lesions  NECK: no adenopathy, " no asymmetry, masses, or scars  RESP: lungs clear to auscultation - no rales, rhonchi or wheezes, some purse lip breathing  CV: regular rate and rhythm, normal S1 S2, no S3 or S4, no murmur, click or rub, no peripheral edema  ABDOMEN: soft, nontender, no hepatosplenomegaly, no masses and bowel sounds normal  MS: no gross musculoskeletal defects noted, bilateral non pitting edema  SKIN: no suspicious lesions or rashes on visible parts  NEURO: Normal strength and tone, mentation intact and speech normal  PSYCH: mentation appears normal, affect normal/bright, mildly anxious.            7/22/2025   Mini Cog   Clock Draw Score 2 Normal   3 Item Recall 2 objects recalled   Mini Cog Total Score 4              Signed Electronically by: Pop Zapien MD, MD

## 2025-07-22 NOTE — TELEPHONE ENCOUNTER
MTM referral from: [place] clinic visit (referral by provider)    MTM referral outreach attempt #1 on July 22, 2025 at 1:08 PM      Outcome: Spoke with patient They don't feel they need an MTM visit at this time.    [facility]

## 2025-07-22 NOTE — PATIENT INSTRUCTIONS
Patient Education   Preventive Care Advice   This is general advice given by our system to help you stay healthy. However, your care team may have specific advice just for you. Please talk to your care team about your preventive care needs.  Nutrition  Eat 5 or more servings of fruits and vegetables each day.  Try wheat bread, brown rice and whole grain pasta (instead of white bread, rice, and pasta).  Get enough calcium and vitamin D. Check the label on foods and aim for 100% of the RDA (recommended daily allowance).  Lifestyle  Exercise at least 150 minutes each week  (30 minutes a day, 5 days a week).  Do muscle strengthening activities 2 days a week. These help control your weight and prevent disease.  No smoking.  Wear sunscreen to prevent skin cancer.  Have a dental exam and cleaning every 6 months.  Yearly exams  See your health care team every year to talk about:  Any changes in your health.  Any medicines your care team has prescribed.  Preventive care, family planning, and ways to prevent chronic diseases.  Shots (vaccines)   HPV shots (up to age 26), if you've never had them before.  Hepatitis B shots (up to age 59), if you've never had them before.  COVID-19 shot: Get this shot when it's due.  Flu shot: Get a flu shot every year.  Tetanus shot: Get a tetanus shot every 10 years.  Pneumococcal, hepatitis A, and RSV shots: Ask your care team if you need these based on your risk.  Shingles shot (for age 50 and up)  General health tests  Diabetes screening:  Starting at age 35, Get screened for diabetes at least every 3 years.  If you are younger than age 35, ask your care team if you should be screened for diabetes.  Cholesterol test: At age 39, start having a cholesterol test every 5 years, or more often if advised.  Bone density scan (DEXA): At age 50, ask your care team if you should have this scan for osteoporosis (brittle bones).  Hepatitis C: Get tested at least once in your life.  STIs (sexually  transmitted infections)  Before age 24: Ask your care team if you should be screened for STIs.  After age 24: Get screened for STIs if you're at risk. You are at risk for STIs (including HIV) if:  You are sexually active with more than one person.  You don't use condoms every time.  You or a partner was diagnosed with a sexually transmitted infection.  If you are at risk for HIV, ask about PrEP medicine to prevent HIV.  Get tested for HIV at least once in your life, whether you are at risk for HIV or not.  Cancer screening tests  Cervical cancer screening: If you have a cervix, begin getting regular cervical cancer screening tests starting at age 21.  Breast cancer scan (mammogram): If you've ever had breasts, begin having regular mammograms starting at age 40. This is a scan to check for breast cancer.  Colon cancer screening: It is important to start screening for colon cancer at age 45.  Have a colonoscopy test every 10 years (or more often if you're at risk) Or, ask your provider about stool tests like a FIT test every year or Cologuard test every 3 years.  To learn more about your testing options, visit:   .  For help making a decision, visit:   https://bit.ly/ey94135.  Prostate cancer screening test: If you have a prostate, ask your care team if a prostate cancer screening test (PSA) at age 55 is right for you.  Lung cancer screening: If you are a current or former smoker ages 50 to 80, ask your care team if ongoing lung cancer screenings are right for you.  For informational purposes only. Not to replace the advice of your health care provider. Copyright   2023 [facility]. All rights reserved. Clinically reviewed by the [facility] Transitions Program. [place] [id number] - REV 01/24.  Learning About Activities of Daily Living  What are activities of daily living?     Activities of daily living (ADLs) are the basic self-care tasks you do every day. These include eating, bathing, dressing,  and moving around.  As you age, and if you have health problems, you may find that it's harder to do some of these tasks. If so, your doctor can suggest ideas that may help.  To measure what kind of help you may need, your doctor will ask how well you are able to do ADLs. Let your doctor know if there are any tasks that you are having trouble doing. This is an important first step to getting help. And when you have the help you need, you can stay as independent as possible.  How will a doctor assess your ADLs?  Asking about ADLs is part of a routine health checkup your doctor will likely do as you age. Your health check might be done in a doctor's office, in your home, or at a hospital. The goal is to find out if you are having any problems that could make it hard to care for yourself or that make it unsafe for you to be on your own.  To measure your ADLs, your doctor will ask how hard it is for you to do routine tasks. Your doctor may also want to know if you have changed the way you do a task because of a health problem. Your doctor may watch how you:  Walk back and forth.  Keep your balance while you stand or walk.  Move from sitting to standing or from a bed to a chair.  Button or unbutton a shirt or sweater.  Remove and put on your shoes.  It's common to feel a little worried or anxious if you find you can't do all the things you used to be able to do. Talking with your doctor about ADLs is a way to make sure you're as safe as possible and able to care for yourself as well as you can. You may want to bring a caregiver, friend, or family member to your checkup. They can help you talk to your doctor.  Follow-up care is a key part of your treatment and safety. Be sure to make and go to all appointments, and call your doctor if you are having problems. It's also a good idea to know your test results and keep a list of the medicines you take.  Current as of: October 24, 2024  Content Version: 14.5    1918-1695  [employer].   Care instructions adapted under license by your healthcare professional. If you have questions about a medical condition or this instruction, always ask your healthcare professional. [employer] disclaims any warranty or liability for your use of this information.    Preventing Falls: Care Instructions  Injuries and health problems such as trouble walking or poor eyesight can increase your risk of falling. So can some medicines. But there are things you can do to help prevent falls. You can exercise to get stronger. You can also arrange your home to make it safer.    Talk to your doctor about the medicines you take. Ask if any of them increase the risk of falls and whether they can be changed or stopped.   Try to exercise regularly. It can help improve your strength and balance. This can help lower your risk of falling.         Practice fall safety and prevention.   Wear low-heeled shoes that fit well and give your feet good support. Talk to your doctor if you have foot problems that make this hard.  Carry a cellphone or wear a medical alert device that you can use to call for help.  Use stepladders instead of chairs to reach high objects. Don't climb if you're at risk for falls. Ask for help, if needed.  Wear the correct eyeglasses, if you need them.        Make your home safer.   Remove rugs, cords, clutter, and furniture from walkways.  Keep your house well lit. Use night-lights in hallways and bathrooms.  Install and use sturdy handrails on stairways.  Wear nonskid footwear, even inside. Don't walk barefoot or in socks without shoes.        Be safe outside.   Use handrails, curb cuts, and ramps whenever possible.  Keep your hands free by using a shoulder bag or backpack.  Try to walk in well-lit areas. Watch out for uneven ground, changes in pavement, and debris.  Be careful in the winter. Walk on the grass or gravel when sidewalks are slippery. Use de-icer on steps and  "walkways. Add non-slip devices to shoes.    Put grab bars and nonskid mats in your shower or tub and near the toilet. Try to use a shower chair or bath bench when bathing.   Get into a tub or shower by putting in your weaker leg first. Get out with your strong side first. Have a phone or medical alert device in the bathroom with you.   Where can you learn more?  Go to https://www.[employer].net/patiented  Enter G117 in the search box to learn more about \"Preventing Falls: Care Instructions.\"  Current as of: July 31, 2024  Content Version: 14.5    8109-7072 [employer]   Care instructions adapted under license by your healthcare professional. If you have questions about a medical condition or this instruction, always ask your healthcare professional. [employer] disclaims any warranty or liability for your use of this information.    Hearing Loss: Care Instructions  Overview     Hearing loss is a sudden or slow decrease in how well you hear. It can range from slight to profound. Permanent hearing loss can occur with aging. It also can happen when you are exposed long-term to loud noise. Examples include listening to loud music, riding motorcycles, or being around other loud machines.  Hearing loss can affect your work and home life. It can make you feel lonely or depressed. You may feel that you have lost your independence. But hearing aids and other devices can help you hear better and feel connected to others.  Follow-up care is a key part of your treatment and safety. Be sure to make and go to all appointments, and call your doctor if you are having problems. It's also a good idea to know your test results and keep a list of the medicines you take.  How can you care for yourself at home?  Avoid loud noises whenever possible. This helps keep your hearing from getting worse.  Always wear hearing protection around loud noises.  Wear a hearing aid as directed.  A professional can help you pick a " "hearing aid that will work best for you.  You can also get hearing aids over the counter for mild to moderate hearing loss.  Have hearing tests as your doctor suggests. They can show whether your hearing has changed. Your hearing aid may need to be adjusted.  Use other devices as needed. These may include:  Telephone amplifiers and hearing aids that can connect to a television, stereo, radio, or microphone.  Devices that use lights or vibrations. These alert you to the doorbell, a ringing telephone, or a baby monitor.  Television closed-captioning. This shows the words at the bottom of the screen. Most new TVs can do this.  TTY (text telephone). This lets you type messages back and forth on the telephone instead of talking or listening. These devices are also called TDD. When messages are typed on the keyboard, they are sent over the phone line to a receiving TTY. The message is shown on a monitor.  Use text messaging, social media, and email if it is hard for you to communicate by telephone.  Try to learn a listening technique called speechreading. It is not lipreading. You pay attention to people's gestures, expressions, posture, and tone of voice. These clues can help you understand what a person is saying. Face the person you are talking to, and have them face you. Make sure the lighting is good. You need to see the other person's face clearly.  Think about counseling if you need help to adjust to your hearing loss.  When should you call for help?  Watch closely for changes in your health, and be sure to contact your doctor if:    You think your hearing is getting worse.     You have new symptoms, such as dizziness or nausea.   Where can you learn more?  Go to https://www.[employer].net/patiented  Enter R798 in the search box to learn more about \"Hearing Loss: Care Instructions.\"  Current as of: October 27, 2024  Content Version: 14.5 2024-2025 [facility].   Care instructions adapted under license " by your healthcare professional. If you have questions about a medical condition or this instruction, always ask your healthcare professional. [employer] disclaims any warranty or liability for your use of this information.    Learning About Stress  What is stress?     Stress is your body's response to a hard situation. Your body can have a physical, emotional, or mental response. Stress is a fact of life for most people, and it affects everyone differently. What causes stress for you may not be stressful for someone else.  A lot of things can cause stress. You may feel stress when you go on a job interview, take a test, or run a race. This kind of short-term stress is normal and even useful. It can help you if you need to work hard or react quickly. For example, stress can help you finish an important job on time.  Long-term stress is caused by ongoing stressful situations or events. Examples of long-term stress include long-term health problems, ongoing problems at work, or conflicts in your family. Long-term stress can harm your health.  How does stress affect your health?  When you are stressed, your body responds as though you are in danger. It makes hormones that speed up your heart, make you breathe faster, and give you a burst of energy. This is called the fight-or-flight stress response. If the stress is over quickly, your body goes back to normal and no harm is done.  But if stress happens too often or lasts too long, it can have bad effects. Long-term stress can make you more likely to get sick, and it can make symptoms of some diseases worse. If you tense up when you are stressed, you may develop neck, shoulder, or low back pain. Stress is linked to high blood pressure and heart disease.  Stress also harms your emotional health. It can make you dominique, tense, or depressed. Your relationships may suffer, and you may not do well at work or school.  What can you do to manage stress?  You can try  these things to help manage stress:   Do something active. Exercise or activity can help reduce stress. Walking is a great way to get started. Even everyday activities such as housecleaning or yard work can help.  Try yoga or jaspre chi. These techniques combine exercise and meditation. You may need some training at first to learn them.  Do something you enjoy. For example, listen to music or go to a movie. Practice your hobby or do volunteer work.  Meditate. This can help you relax, because you are not worrying about what happened before or what may happen in the future.  Do guided imagery. Imagine yourself in any setting that helps you feel calm. You can use online videos, books, or a teacher to guide you.  Do breathing exercises. For example:  From a standing position, bend forward from the waist with your knees slightly bent. Let your arms dangle close to the floor.  Breathe in slowly and deeply as you return to a standing position. Roll up slowly and lift your head last.  Hold your breath for just a few seconds in the standing position.  Breathe out slowly and bend forward from the waist.  Let your feelings out. Talk, laugh, cry, and express anger when you need to. Talking with supportive friends or family, a counselor, or a sal leader about your feelings is a healthy way to relieve stress. Avoid discussing your feelings with people who make you feel worse.  Write. It may help to write about things that are bothering you. This helps you find out how much stress you feel and what is causing it. When you know this, you can find better ways to cope.  What can you do to prevent stress?  You might try some of these things to help prevent stress:  Manage your time. This helps you find time to do the things you want and need to do.  Get enough sleep. Your body recovers from the stresses of the day while you are sleeping.  Get support. Your family, friends, and community can make a difference in how you experience  "stress.  Limit your news feed. Avoid or limit time on social media or news that may make you feel stressed.  Do something active. Exercise or activity can help reduce stress. Walking is a great way to get started.  Where can you learn more?  Go to https://www.[employer].net/patiented  Enter N032 in the search box to learn more about \"Learning About Stress.\"  Current as of: October 24, 2024  Content Version: 14.5 2024-2025 [employer].   Care instructions adapted under license by your healthcare professional. If you have questions about a medical condition or this instruction, always ask your healthcare professional. [employer] disclaims any warranty or liability for your use of this information.    Learning About Sleeping Well  What does sleeping well mean?     Sleeping well means getting enough sleep to feel good and stay healthy. How much sleep is enough varies among people.  The number of hours you sleep and how you feel when you wake up are both important. If you do not feel refreshed, you probably need more sleep. Another sign of not getting enough sleep is feeling tired during the day.  Experts recommend that adults get at least 7 or more hours of sleep per day. Children and older adults need more sleep.  Why is getting enough sleep important?  Getting enough quality sleep is a basic part of good health. When your sleep suffers, your physical health, mood, and your thoughts can suffer too. You may find yourself feeling more grumpy or stressed. Not getting enough sleep also can lead to serious problems, including injury, accidents, anxiety, and depression.  What might cause poor sleeping?  Many things can cause sleep problems, including:  Changes to your sleep schedule.  Stress. Stress can be caused by fear about a single event, such as giving a speech. Or you may have ongoing stress, such as worry about work or school.  Depression, anxiety, and other mental or emotional " "conditions.  Changes in your sleep habits or surroundings. This includes changes that happen where you sleep, such as noise, light, or sleeping in a different bed. It also includes changes in your sleep pattern, such as having jet lag or working a late shift.  Health problems, such as pain, breathing problems, and restless legs syndrome.  Lack of regular exercise.  Using alcohol, nicotine, or caffeine before bed.  How can you help yourself?  Here are some tips that may help you sleep more soundly and wake up feeling more refreshed.  Your sleeping area   Use your bedroom only for sleeping and sex. A bit of light reading may help you fall asleep. But if it doesn't, do your reading elsewhere in the house. Try not to use your TV, computer, smartphone, or tablet while you are in bed.  Be sure your bed is big enough to stretch out comfortably, especially if you have a sleep partner.  Keep your bedroom quiet, dark, and cool. Use curtains, blinds, or a sleep mask to block out light. To block out noise, use earplugs, soothing music, or a \"white noise\" machine.  Your evening and bedtime routine   Create a relaxing bedtime routine. You might want to take a warm shower or bath, or listen to soothing music.  Go to bed at the same time every night. And get up at the same time every morning, even if you feel tired.  What to avoid   Limit caffeine (coffee, tea, caffeinated sodas) during the day, and don't have any for at least 6 hours before bedtime.  Avoid drinking alcohol before bedtime. Alcohol can cause you to wake up more often during the night.  Try not to smoke or use tobacco, especially in the evening. Nicotine can keep you awake.  Limit naps during the day, especially close to bedtime.  Avoid lying in bed awake for too long. If you can't fall asleep or if you wake up in the middle of the night and can't get back to sleep within about 20 minutes, get out of bed and go to another room until you feel sleepy.  Avoid taking " "medicine right before bed that may keep you awake or make you feel hyper or energized. Your doctor can tell you if your medicine may do this and if you can take it earlier in the day.  If you can't sleep   Imagine yourself in a peaceful, pleasant scene. Focus on the details and feelings of being in a place that is relaxing.  Get up and do a quiet or boring activity until you feel sleepy.  Avoid drinking any liquids before going to bed to help prevent waking up often to use the bathroom.  Where can you learn more?  Go to https://www.[employer].net/patiented  Enter J942 in the search box to learn more about \"Learning About Sleeping Well.\"  Current as of: July 31, 2024  Content Version: 14.5 2024-2025 [employer].   Care instructions adapted under license by your healthcare professional. If you have questions about a medical condition or this instruction, always ask your healthcare professional. [employer] disclaims any warranty or liability for your use of this information.    Bladder Training: Care Instructions  Your Care Instructions     Bladder training is used to treat urge incontinence and stress incontinence. Urge incontinence means that the need to urinate comes on so fast that you can't get to a toilet in time. Stress incontinence means that you leak urine because of pressure on your bladder. For example, it may happen when you laugh, cough, or lift something heavy.  Bladder training can increase how long you can wait before you have to urinate. It can also help your bladder hold more urine. And it can give you better control over the urge to urinate.  It is important to remember that bladder training takes a few weeks to a few months to make a difference. You may not see results right away, but don't give up.  Follow-up care is a key part of your treatment and safety. Be sure to make and go to all appointments, and call your doctor if you are having problems. It's also a good idea to " know your test results and keep a list of the medicines you take.  How can you care for yourself at home?  Work with your doctor to come up with a bladder training program that is right for you. You may use one or more of the following methods.  Delayed urination  In the beginning, try to keep from urinating for 5 minutes after you first feel the need to go.  While you wait, take deep, slow breaths to relax. Kegel exercises can also help you delay the need to go to the bathroom.  After some practice, when you can easily wait 5 minutes to urinate, try to wait 10 minutes before you urinate.  Slowly increase the waiting period until you are able to control when you have to urinate.  Scheduled urination  Empty your bladder when you first wake up in the morning.  Schedule times throughout the day when you will urinate.  Start by going to the bathroom every hour, even if you don't need to go.  Slowly increase the time between trips to the bathroom.  When you have found a schedule that works well for you, keep doing it.  If you wake up during the night and have to urinate, do it. Apply your schedule to waking hours only.  Kegel exercises  These tighten and strengthen pelvic muscles, which can help you control the flow of urine. (If doing these exercises causes pain, stop doing them and talk with your doctor.) To do Kegel exercises:  Squeeze your muscles as if you were trying not to pass gas. Or squeeze your muscles as if you were stopping the flow of urine. Your belly, legs, and buttocks shouldn't move.  Hold the squeeze for 3 seconds, then relax for 5 to 10 seconds.  Start with 3 seconds, then add 1 second each week until you are able to squeeze for 10 seconds.  Repeat the exercise 10 times a session. Do 3 to 8 sessions a day.  When should you call for help?  Watch closely for changes in your health, and be sure to contact your doctor if:    Your incontinence is getting worse.     You do not get better as expected.  "  Where can you learn more?  Go to https://www.[employer].net/patiented  Enter V684 in the search box to learn more about \"Bladder Training: Care Instructions.\"  Current as of: April 30, 2024  Content Version: 14.5 2024-2025 [employer].   Care instructions adapted under license by your healthcare professional. If you have questions about a medical condition or this instruction, always ask your healthcare professional. [employer] disclaims any warranty or liability for your use of this information.       "

## 2025-07-24 ENCOUNTER — ANCILLARY PROCEDURE (OUTPATIENT)
Dept: RADIOLOGY | Facility: AMBULATORY SURGERY CENTER | Age: OVER 89
End: 2025-07-24
Attending: INTERNAL MEDICINE
Payer: MEDICARE

## 2025-07-24 ENCOUNTER — HOSPITAL ENCOUNTER (OUTPATIENT)
Facility: AMBULATORY SURGERY CENTER | Age: OVER 89
Discharge: HOME | End: 2025-07-24
Attending: STUDENT IN AN ORGANIZED HEALTH CARE EDUCATION/TRAINING PROGRAM | Admitting: STUDENT IN AN ORGANIZED HEALTH CARE EDUCATION/TRAINING PROGRAM
Payer: MEDICARE

## 2025-07-24 VITALS
HEART RATE: 106 BPM | SYSTOLIC BLOOD PRESSURE: 129 MMHG | RESPIRATION RATE: 16 BRPM | WEIGHT: 167 LBS | HEIGHT: 57 IN | BODY MASS INDEX: 36.03 KG/M2 | DIASTOLIC BLOOD PRESSURE: 65 MMHG | TEMPERATURE: 97 F | OXYGEN SATURATION: 96 %

## 2025-07-24 DIAGNOSIS — C83.38 DIFFUSE LARGE B-CELL LYMPHOMA OF LYMPH NODES OF MULTIPLE REGIONS (H): ICD-10-CM

## 2025-07-24 LAB
ERYTHROCYTE [DISTWIDTH] IN BLOOD BY AUTOMATED COUNT: 16.1 % (ref 10–15)
HCT VFR BLD AUTO: 37.8 % (ref 35–47)
HGB BLD-MCNC: 11.5 G/DL (ref 11.7–15.7)
INR PPP: 1.04 (ref 0.85–1.15)
MCH RBC QN AUTO: 25.3 PG (ref 26.5–33)
MCHC RBC AUTO-ENTMCNC: 30.4 G/DL (ref 31.5–36.5)
MCV RBC AUTO: 83 FL (ref 78–100)
PLATELET # BLD AUTO: 200 10E3/UL (ref 150–450)
PROTHROMBIN TIME: 13.8 SECONDS (ref 11.8–14.8)
RBC # BLD AUTO: 4.54 10E6/UL (ref 3.8–5.2)
WBC # BLD AUTO: 8.1 10E3/UL (ref 4–11)

## 2025-07-24 PROCEDURE — 36561 INSERT TUNNELED CV CATH: CPT | Mod: RT | Performed by: STUDENT IN AN ORGANIZED HEALTH CARE EDUCATION/TRAINING PROGRAM

## 2025-07-24 PROCEDURE — 77001 FLUOROGUIDE FOR VEIN DEVICE: CPT | Mod: 26 | Performed by: STUDENT IN AN ORGANIZED HEALTH CARE EDUCATION/TRAINING PROGRAM

## 2025-07-24 PROCEDURE — 76937 US GUIDE VASCULAR ACCESS: CPT | Mod: 26 | Performed by: STUDENT IN AN ORGANIZED HEALTH CARE EDUCATION/TRAINING PROGRAM

## 2025-07-24 RX ORDER — LIDOCAINE 40 MG/G
CREAM TOPICAL
Status: ACTIVE | OUTPATIENT
Start: 2025-07-24

## 2025-07-24 RX ORDER — HEPARIN SODIUM (PORCINE) LOCK FLUSH IV SOLN 100 UNIT/ML 100 UNIT/ML
SOLUTION INTRAVENOUS DAILY PRN
Status: DISCONTINUED | OUTPATIENT
Start: 2025-07-24 | End: 2025-07-24 | Stop reason: HOSPADM

## 2025-07-24 RX ORDER — METOPROLOL SUCCINATE 100 MG/1
100 TABLET, EXTENDED RELEASE ORAL DAILY
COMMUNITY

## 2025-07-24 RX ORDER — HEPARIN SODIUM (PORCINE) LOCK FLUSH IV SOLN 100 UNIT/ML 100 UNIT/ML
5-10 SOLUTION INTRAVENOUS
Status: ACTIVE | OUTPATIENT
Start: 2025-07-24

## 2025-07-24 RX ORDER — CEFAZOLIN SODIUM 2 G/50ML
2 SOLUTION INTRAVENOUS SEE ADMIN INSTRUCTIONS
Status: ACTIVE | OUTPATIENT
Start: 2025-07-24

## 2025-07-24 RX ORDER — HEPARIN SODIUM,PORCINE 10 UNIT/ML
5-10 VIAL (ML) INTRAVENOUS EVERY 24 HOURS
Status: ACTIVE | OUTPATIENT
Start: 2025-07-24

## 2025-07-24 RX ORDER — HEPARIN SODIUM,PORCINE 10 UNIT/ML
5-10 VIAL (ML) INTRAVENOUS
Status: ACTIVE | OUTPATIENT
Start: 2025-07-24

## 2025-07-24 RX ORDER — CEFAZOLIN SODIUM 2 G/50ML
2 SOLUTION INTRAVENOUS
Status: ACTIVE | OUTPATIENT
Start: 2025-07-24

## 2025-07-24 RX ADMIN — CEFAZOLIN SODIUM 2 G: 2 SOLUTION INTRAVENOUS at 09:28

## 2025-07-24 NOTE — DISCHARGE INSTRUCTIONS
A collaboration between [facility] and [facility]  Experts in minimally invasive, targeted treatments performed using imaging guidance    Venous Access Device,  Port Catheter or Tunneled or Non-Tunneled Central Line Placement    Today you had a procedure today to install a venous access device; either a tunneled central vein catheter or a subcutaneous port catheter.    After you go home:  Drink plenty of fluids.  Generally 6-8 (8 ounce) glasses a day is recommended.  Resume your regular diet unless otherwise ordered by a medical provider.  Keep any applied tape/gauze dressings clean and dry.  Change tape/gauze dressings if they get wet or soiled.  You may shower the following day after procedure, however cover and protect from moisture any tape/gauze dressings.  You may let water hit and run over dried skin glue, but do not scrub.  Pat the area dry after showering.  Port placement incisions are closed with absorbable suture, meaning they do not need to be removed at a later date, and a topical skin adhesive (skin glue).  This glue will wear off in 7-14 days.  Do not remove before this time.  If 14 days have passed and residual glue is present, you may gently remove it.  Do not apply gels, lotions, or ointments to the glue site for the first 10 days as this may cause the glue to prematurely soften and fail.  Do not perform strenuous activities or lift greater than 10 pounds for the next three days.  If there is bleeding or oozing from the procedure site, apply firm pressure to the area for 5-10 minutes.  If the bleeding continues seek medical advice at the numbers below.  Mild procedure site discomfort can be treated with an ice pack and over-the-counter pain relievers.        For 24 hours after any sedation used:  Relax and take it easy.  No strenuous activities.  Do not drive or operate machines at home or at work.  No alcohol consumption.  Do not make any  important or legal decisions.    Call our Interventional Radiology (IR) service if:  If you start bleeding from the procedure site.  If you do start to bleed from the site, lie down and hold some pressure on the site.  Our radiology provider can help you decide if you need to return to the hospital.  If you have new or worsening pain related to the procedure.  If you have concerning swelling at the procedure site.  If you develop persistent nausea or vomiting.  If you develop hives or a rash or any unexplained itching.  If you have a fever of greater than 100.5  F and chills in the first 5 days after procedure.  Any other concerns related to your procedure.      [facility]  Interventional Radiology (IR)  [address]  [address] [place] [address]    Contact Number:  [phone]  (IR Nurse Triage)  Monday - Friday 7am - 4pm    After hours for urgent concerns:  [phone]  After 4pm Monday - Friday, Weekends and Holidays.   Ask for Interventional Radiology on-call.  Someone is available 24 hours a day.  [facility] toll free number:  [phone]

## 2025-07-24 NOTE — PROGRESS NOTES
Interventional Radiology Pre-Procedure Sedation Assessment   Time of Assessment: 9:27 AM    Expected Level: Moderate Sedation    Indication: Sedation is required for the following type of Procedure: Venous Access    Sedation and procedural consent: Risks, benefits and alternatives were discussed with Patient    PO Intake: Appropriately NPO for procedure    ASA Class: Class 2 - MILD SYSTEMIC DISEASE, NO ACUTE PROBLEMS, NO FUNCTIONAL LIMITATIONS.    Mallampati: Grade 2:  Soft palate, base of uvula, tonsillar pillars, and portion of posterior pharyngeal wall visible    Lungs: Lungs Clear with good breath sounds bilaterally    Heart: Rate is irregularly irregular. No murmurs or gallops appreciated.    History and physical reviewed and no updates needed. I have reviewed the lab findings, diagnostic data, medications, and the plan for sedation. I have determined this patient to be an appropriate candidate for the planned sedation and procedure and have reassessed the patient IMMEDIATELY PRIOR to sedation and procedure.    Efren Cason PA-C

## 2025-07-24 NOTE — OR NURSING
Patient presents for port.  Noted to be pursed lip breathing with RR of 24 BPM.  Sats 96% RA.  Diminished lung sounds to lower lung fields.  LE edema and compression stocking in place.  Limited activity tolerance of about 25 feet of walking due to her breathing and spinal stenosis.  Using rolling walker.  Hx of CAD s/p NSTEMI and atrial fibrillation and HR controlled but irregular.  On Metoprolol and Xarelto, Jardiance.  Did hold Jardiance and Xarelto since Monday.  Took Metoprolol today.  Reported to Crista in procedures for provider review.  No new orders.

## 2025-07-24 NOTE — BRIEF OP NOTE
[facility]    Brief Operative Note    Pre-operative diagnosis: Diffuse large B-cell lymphoma of lymph nodes of multiple regions (H) [C83.38]  Post-operative diagnosis Same as pre-operative diagnosis    Procedure: Insert chest port, N/A - Chest    Surgeon: Surgeons and Role:     * Zarina Jorgensen MD - Primary  Anesthesia: Local   Estimated Blood Loss: 5 mL    Drains: None  Specimens: * No specimens in log *  Findings:   Patent but deviated internal jugular vein. Right chest port via right internal jugular with 24.5 cm length and tip in the right atrium.  Complications: None.  Implants:   Implant Name Type Inv. Item Serial No.  Lot No. LRB No. Used Action   CATH PORT SMART VORTEX LOW PROFILE 8FR [id number] - [id number] Catheter CATH PORT SMART VORTEX LOW PROFILE 8FR [id number]  [employer] [id number] Right 1 Implanted

## 2025-07-28 ENCOUNTER — PATIENT OUTREACH (OUTPATIENT)
Dept: ONCOLOGY | Facility: CLINIC | Age: OVER 89
End: 2025-07-28
Payer: MEDICARE

## 2025-07-28 NOTE — PROGRESS NOTES
[facility]: Cancer Care                                                                                          Call placed to Dimple's son Issa to review the plan for Dimple's upcoming appointments on Wednesday 7/30/2025 for labs and a visit with Dr. Lazo and then starting R-CHOP on Friday 8/1/2025.    I explained that we would go through the treatment plan information on Wednesday in person as this would be easier than trying to complete it on the phone with Dimple.  Issa was reminded if he has questions prior to that visit he should call our clinic and ask for me    Signature:  Pattie García RN

## 2025-07-29 NOTE — PROGRESS NOTES
[facility]  Follow Up Visit  Jul 30, 2025  Hem/onc History:   HEMATOLOGIC HISTORY:   --April 2025: During regular surveillance for known transitional cell carcinoma, Ms. Oviedo was found to have interval progression of pleural-based lung nodules.  Biopsy revealed large B-cell lymphoma.   --4/23/2025 CT IMPRESSION:   Increased size of pleural-based lesions along the posterior and medial left hemithorax.   Pleura/lungs:  Left posteromedial enhancing pleural thickening measuring 2.2 x 0.6 cm (3/193) previously measured 2.1 x 7.4 cm.   Superior enhancing peripheral nodule in the left lower thorax measuring 2.2 x 1.3 cm (3/166) previously measured 1.2 x 0.6 cm.  Adjacent medial pleural nodule measuring 1.1 x 0.6 cm (3/159) is stable.  Stable calcified nodule in the posterior right lower lobe base.  6/3/25: Left lower lobe core needle biopsy; large B cell lymphoma. The neoplastic cells are positive for CD20, CD45, BCL-2, and MUM-1 but negative for CD5, CD10, CD30, c-Myc, CK AE1/AE3, HMB45, and DILIA-ELIZABETH.  The expression of c-MYC in neoplastic cells is low (less than 40%). The proliferation index by Ki-67 is approximately 20-30%. The lymphoma cells are non-germinal center B-cell type according to immunohistochemistry and the Pradeep algorithm.   FISH: No rearrangements of MYC, BCL-2, or BCL-6.   --June 2025: CBCs reflect a new, mild anemia, MCV 86. CBC w/ differential otherwise within normal limits. CMPs reflect an eGFR of ~50, close to long-term baseline; otherwise within normal limits.   6/18/25 LDH: 218 (normal range). SPEP without monoclonal protein.   Iron studies within normal limits except Iron Sat 10 (low). Vitamin B12 346 (low-normal). Folate 15.0. HBV, HCV, HIV Serologies negative. EBV viral load undetectable.   6/25/25 PET/CT: IMPRESSION: 1.  Multifocal hypermetabolic pleural-based soft tissue foci along the dependent left lower lobe, representing biopsy proven diffuse large B-cell lymphoma. Note that one  of these foci was present with hypermetabolism on 9/13/2023. 2.  Persistent intense uptake within a left lower pole thyroid nodule, not significantly changed, with significant enlarged multinodular  thyroid overall. No nodule requiring biopsy found on ultrasound dated 5/30/2024. Recommend continued attention on follow-up imaging. 3.  Increased uptake within the stomach, with small hiatal hernia,  which may represent esophagogastritis. Recommend correlation patient's symptoms. 4.  Incidental CT findings, as above.  7/1/25 TTE: Global and regional left ventricular function is normal with an EF of 55-60%. The right ventricular systolic pressure is 37mmHg above the right atrial  pressure. Mild aortic stenosis is present. Severe biatrial enlargement is present.   7/14/25 Cardiology Evaluation: Propranolol started in place of Diltiazem to avoid interaction with chemotherapy. Dimple Oviedo has been diagnosed with lymphoma and has been recommended mini-R-CHOP. There is a potential drug interaction between Diltiazem and both Doxorubicin and Vincristine.      ONCOLOGIC HISTORY:Adapted from prior notes.   1. 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 S/p Right robotic nephroureterectomy, excision of sandip-caval mass and LN excision 11/13/2018. Pathologic stage pT4N1 (1/6 lymph nodes). Residual FDG-avid disease demonstrated on PET/CT.   --S/p Adjuvant Carboplatin/Gemcitabine chemotherapy 12/2018  --GABRIELLE of serial surveillance      2. Non-muscle invasive bladder cancer:  - 10/3/19: Cystoscopy showed a small area of erythema on retroflexion, possibly pre-existing or due to retroflexion of the scope. Urine cytology from that time showed cells suspicious for malignancy. 1/13/20: Bladder biopsy showed high grade urothelial carcinoma in-situ  - 2/12/20-3/18/20: Intravesical BCG therapy  - 7/24/20 and last cystoscopy was on the same day. It was negative.  However, urine cytology was positive for high-grade urothelial carcinoma.   - 11/25/20-12/10/2020: 3 weekly doses of intravesical BCG 50mg.   - 12/10/20: Cytology suspicious and FISH urovysion positive for TCC.    History of Present Illness:   Ms. Oviedo returns along with  her son Issa.   Her daughter joins by telephone. Dimple is feeling well.  No recent infectious episodes or concerns.     Medications:       Current Outpatient Medications   Medication Sig Dispense Refill    alendronate (FOSAMAX) 70 MG tablet TAKE 1 TABLET EVERY 7 DAYS AT LEAST 60 MINUTES BEFORE BREAKFAST AS DIRECTED. 12 tablet 3    diltiazem ER (DILT-XR) 180 MG 24 hr capsule TAKE 1 CAPSULE EVERY DAY 90 capsule 3    empagliflozin (JARDIANCE) 10 MG TABS tablet Take 1 tablet (10 mg) by mouth daily. 90 tablet 3    furosemide (LASIX) 20 MG tablet TAKE 1 TABLET TWICE DAILY IN THE MORNING AND AFTER LUNCH 180 tablet 3    irbesartan (AVAPRO) 300 MG tablet Take 1 tablet (300 mg) by mouth daily. 90 tablet 3    lovastatin (MEVACOR) 40 MG tablet TAKE 1 TABLET AT BEDTIME (SCHEDULE ANNUAL VISIT, NEED FASTING LABS) 90 tablet 3    methimazole (TAPAZOLE) 5 MG tablet TAKE 1 TABLET ALTERNATING WITH 1/2 TABLET EVERY OTHER DAY 66 tablet 3    metoprolol succinate ER (TOPROL XL) 100 MG 24 hr tablet Take 100 mg by mouth daily.      Omega-3 Fatty Acids (FISH OIL) 500 MG CAPS Take 1 capsule by mouth every morning       omeprazole (PRILOSEC) 20 MG DR capsule TAKE 1 CAPSULE EVERY DAY 90 capsule 3    RESTASIS 0.05 % ophthalmic emulsion       rOPINIRole (REQUIP) 0.25 MG tablet TAKE 1 TABLET IN THE AFTERNOON AND 2 TABLETS AT NIGHT 270 tablet 3    sertraline (ZOLOFT) 100 MG tablet Take 1 tablet (100 mg) by mouth every morning. 90 tablet 3    traZODone (DESYREL) 50 MG tablet TAKE 1/2 TABLET AT BEDTIME 45 tablet 3    XARELTO ANTICOAGULANT 15 MG TABS tablet TAKE 1 TABLET (15 MG) BY MOUTH DAILY (WITH DINNER) 90 tablet 3     No current facility-administered medications for this  visit.        Physical Exam:   /79   Pulse 83   Temp 98.1  F (36.7  C) (Oral)   Resp 20   Wt 76.7 kg (169 lb 1.6 oz)   SpO2 95%   BMI 36.59 kg/m      ECOG PS: 2  General: NAD; H&N: no mucosal lesions; Extremities: No edema; Skin: No rash; Neuro: Nonfocal; Mood/Affect: appropriate; Lymph: no LAD  Assessment and Plan:   # EBV+ Large B-cell lymphoma, Non-GC, stage II with limited non-contiguous extranodal involvement  # CKD II, eGFR 50  # Asthma  # Arthritis  # GERD  # Osteopenia  # Polymylagia rheumatica  # Stress-induced cardiomyopathy  # Hypertension  # History of high-grade, muscle-invasive, transitional cell carcinoma of right renal pelvis, stage IV ([id number])  # History of adjuvant carboplatin/gemcitabine chemotherapy  # Permanent atrial fibrillation, on chronic anticoagulation  # Tricuspid regurgitation, aortic regurgitation, CHF NYHA Class III  # Possible pulmonary hypertension     Ms. Dimple Oviedo is an 89 year old woman with a history of CKD II, muscle-invasive transitional cell carcinoma of the right renal pelvis, permanent atrial fibrillation on chronic anticoagulation. She was diagnosed with DLBCL, non-GC immunophenotype, on 6/3/25. Studies are negative for DHL/THL disease. PET/CT shows localized pleural multifocal involvement only; note that one of the hypermetabolic lesions was present in 2023, and may be related to her TCC instead. She has a thyroid nodule that is additionally hypermetabolic, but is stable and has been investigated by U/S previously. I would consider her to have stage II DLBCL based on limited involvement of a single extranodal site. IPI1 (good risk).     By the Paul-Schonberg index, her 5-year mortality is estimated at 60%, her 10-year mortality is estimated at 93%. By CARG score is 8, implying a 59% change of severe toxicity from poly-chemotherapy. However, the life expectancy of patients with DLBCL who do not receive therapy is generally measured in months; therefore I  counseled her that while chemotherapy is high risk, pursuing treatment may still be consistent with her goals of care, if she wishes to maximize her chance of cure.     We previously discussed immunochemotherapy in detail, but I again reviewed mini-R-CHOP with her today. This regimen is recommended for elderly patients by current NCCN guidelines. In prior trials, elderly patients received 6 cycles of this regimen for any stage, if tolerated well (The Lancet Oncology Volume 12, Issue 5, May 2011, Pages 460-468).  Toxicity was predominantly infectious and related to neutropenia. Deaths due to complications from chemotherapy were reported. As an alternative, I also offered localized radiation instead of systemic therapy, which might have a lower likelihood of cure, but a reasonable chance of temporary local disease control; she declined this, preferring to pursue systemic therapy.     We reviewed HOP in great detail today, using jargon-free language. We discussed specific drugs, doses, schedule, routes, and potential side effects and toxicities: these include severe bone marrow suppression, neutropenia, infection, neutropenic fever, sepsis, anemia, bleeding, hair loss, nausea/vomiting/constipation, diarrhea, anorexia, abdominal pain, mouth pain (mucositis), rash, heart damage, liver damage, kidney damage, or damage to the nervous system. Any organ may be damaged irreversibly, and this regimen may lead to fatal complications. Patients receiving chemotherapy may experience cytopenias significant enough to necessitate blood product transfusions. Chemotherapy can increase the risk of secondary cancers, including myelodysplastic syndrome (MDS) or acute myeloid leukemia (AML). Infusion reactions can commonly occur with Rituximab infusions. Tumor lysis syndrome can also occur and lead to dangerous electrolyte derangements. Steroids can be associated with hyperglycemia, insomnia, and a higher risk of metabolic syndrome,  osteoporosis, and infectious complications.     She has a new, mild anemia -- defer BMBx for now given negative PET findings. Her iron saturation index is slightly low -- she may have slight iron deficiency in the setting of chronic anticoagulation.     PLAN:  --Mini-RCHOP with G-CSF support. Plan for 3 cycles, with repeat PET/CT and TTE at that time. May pursue ISRT consolidation as opposed to further RCHOP.   --Continue Xarelto -- may need to interrupt if thrombocytopenia develops with treatment  --Lasix, metoprolol, irbesartan, lovastatin per Cardiology    Nikunj Lazo MD, PhD  Division of Hematology, Oncology, and Transplantation  [facility]

## 2025-07-30 ENCOUNTER — ONCOLOGY VISIT (OUTPATIENT)
Dept: ONCOLOGY | Facility: CLINIC | Age: OVER 89
End: 2025-07-30
Attending: INTERNAL MEDICINE
Payer: MEDICARE

## 2025-07-30 VITALS
HEART RATE: 83 BPM | WEIGHT: 169.1 LBS | TEMPERATURE: 98.1 F | OXYGEN SATURATION: 95 % | DIASTOLIC BLOOD PRESSURE: 79 MMHG | SYSTOLIC BLOOD PRESSURE: 128 MMHG | BODY MASS INDEX: 36.59 KG/M2 | RESPIRATION RATE: 20 BRPM

## 2025-07-30 DIAGNOSIS — Z51.11 ENCOUNTER FOR ANTINEOPLASTIC CHEMOTHERAPY: ICD-10-CM

## 2025-07-30 DIAGNOSIS — C83.38 DIFFUSE LARGE B-CELL LYMPHOMA OF LYMPH NODES OF MULTIPLE REGIONS (H): Primary | ICD-10-CM

## 2025-07-30 PROCEDURE — G0463 HOSPITAL OUTPT CLINIC VISIT: HCPCS | Performed by: INTERNAL MEDICINE

## 2025-07-30 PROCEDURE — 250N000011 HC RX IP 250 OP 636: Performed by: INTERNAL MEDICINE

## 2025-07-30 PROCEDURE — 99214 OFFICE O/P EST MOD 30 MIN: CPT | Performed by: INTERNAL MEDICINE

## 2025-07-30 RX ORDER — LORAZEPAM 2 MG/ML
0.5 INJECTION INTRAMUSCULAR EVERY 4 HOURS PRN
OUTPATIENT
Start: 2025-08-01

## 2025-07-30 RX ORDER — HEPARIN SODIUM,PORCINE 10 UNIT/ML
5-20 VIAL (ML) INTRAVENOUS DAILY PRN
OUTPATIENT
Start: 2025-08-01

## 2025-07-30 RX ORDER — DIPHENHYDRAMINE HYDROCHLORIDE 50 MG/ML
50 INJECTION, SOLUTION INTRAMUSCULAR; INTRAVENOUS
Start: 2025-08-01

## 2025-07-30 RX ORDER — MEPERIDINE HYDROCHLORIDE 25 MG/ML
25 INJECTION INTRAMUSCULAR; INTRAVENOUS; SUBCUTANEOUS
OUTPATIENT
Start: 2025-08-01

## 2025-07-30 RX ORDER — DOXORUBICIN HYDROCHLORIDE 2 MG/ML
25 INJECTION, SOLUTION INTRAVENOUS ONCE
OUTPATIENT
Start: 2025-08-01

## 2025-07-30 RX ORDER — ALBUTEROL SULFATE 0.83 MG/ML
2.5 SOLUTION RESPIRATORY (INHALATION)
OUTPATIENT
Start: 2025-08-01

## 2025-07-30 RX ORDER — EPINEPHRINE 1 MG/ML
0.3 INJECTION, SOLUTION INTRAMUSCULAR; SUBCUTANEOUS EVERY 5 MIN PRN
OUTPATIENT
Start: 2025-08-01

## 2025-07-30 RX ORDER — HEPARIN SODIUM (PORCINE) LOCK FLUSH IV SOLN 100 UNIT/ML 100 UNIT/ML
5 SOLUTION INTRAVENOUS
OUTPATIENT
Start: 2025-08-01

## 2025-07-30 RX ORDER — ALBUTEROL SULFATE 90 UG/1
1-2 INHALANT RESPIRATORY (INHALATION)
Start: 2025-08-01

## 2025-07-30 RX ORDER — MEPERIDINE HYDROCHLORIDE 25 MG/ML
25 INJECTION INTRAMUSCULAR; INTRAVENOUS; SUBCUTANEOUS
Start: 2025-08-01

## 2025-07-30 RX ORDER — METHYLPREDNISOLONE SODIUM SUCCINATE 125 MG/2ML
125 INJECTION INTRAMUSCULAR; INTRAVENOUS
Start: 2025-08-01

## 2025-07-30 RX ORDER — DIPHENHYDRAMINE HYDROCHLORIDE 50 MG/ML
50 INJECTION, SOLUTION INTRAMUSCULAR; INTRAVENOUS ONCE
OUTPATIENT
Start: 2025-08-01

## 2025-07-30 RX ORDER — METHYLPREDNISOLONE SODIUM SUCCINATE 40 MG/ML
40 INJECTION INTRAMUSCULAR; INTRAVENOUS
Start: 2025-08-01

## 2025-07-30 RX ORDER — PALONOSETRON 0.05 MG/ML
0.25 INJECTION, SOLUTION INTRAVENOUS ONCE
Start: 2025-08-01

## 2025-07-30 RX ORDER — HEPARIN SODIUM (PORCINE) LOCK FLUSH IV SOLN 100 UNIT/ML 100 UNIT/ML
5 SOLUTION INTRAVENOUS ONCE
Status: COMPLETED | OUTPATIENT
Start: 2025-07-30 | End: 2025-07-30

## 2025-07-30 RX ORDER — ACETAMINOPHEN 325 MG/1
650 TABLET ORAL ONCE
Start: 2025-08-01

## 2025-07-30 RX ORDER — DIPHENHYDRAMINE HYDROCHLORIDE 50 MG/ML
25 INJECTION, SOLUTION INTRAMUSCULAR; INTRAVENOUS
Start: 2025-08-01

## 2025-07-30 RX ORDER — DIPHENHYDRAMINE HCL 25 MG
50 CAPSULE ORAL ONCE
Start: 2025-08-01

## 2025-07-30 RX ADMIN — HEPARIN 5 ML: 100 SYRINGE at 09:29

## 2025-07-30 ASSESSMENT — PAIN SCALES - GENERAL: PAINLEVEL_OUTOF10: NO PAIN (0)

## 2025-07-30 NOTE — LETTER
7/30/2025      Dimple Oviedo  [address]  [facility] [address]      Dear Colleague,    Thank you for referring your patient, Dimple Oviedo, to the [facility]. Please see a copy of my visit note below.    [facility]  Follow Up Visit  Jul 30, 2025  Hem/onc History:   HEMATOLOGIC HISTORY:   --April 2025: During regular surveillance for known transitional cell carcinoma, Ms. Oviedo was found to have interval progression of pleural-based lung nodules.  Biopsy revealed large B-cell lymphoma.   --4/23/2025 CT IMPRESSION:   Increased size of pleural-based lesions along the posterior and medial left hemithorax.   Pleura/lungs:  Left posteromedial enhancing pleural thickening measuring 2.2 x 0.6 cm (3/193) previously measured 2.1 x 7.4 cm.   Superior enhancing peripheral nodule in the left lower thorax measuring 2.2 x 1.3 cm (3/166) previously measured 1.2 x 0.6 cm.  Adjacent medial pleural nodule measuring 1.1 x 0.6 cm (3/159) is stable.  Stable calcified nodule in the posterior right lower lobe base.  6/3/25: Left lower lobe core needle biopsy; large B cell lymphoma. The neoplastic cells are positive for CD20, CD45, BCL-2, and MUM-1 but negative for CD5, CD10, CD30, c-Myc, CK AE1/AE3, HMB45, and DILIA-ELIZABETH.  The expression of c-MYC in neoplastic cells is low (less than 40%). The proliferation index by Ki-67 is approximately 20-30%. The lymphoma cells are non-germinal center B-cell type according to immunohistochemistry and the Pradeep algorithm.   FISH: No rearrangements of MYC, BCL-2, or BCL-6.   --June 2025: CBCs reflect a new, mild anemia, MCV 86. CBC w/ differential otherwise within normal limits. CMPs reflect an eGFR of ~50, close to long-term baseline; otherwise within normal limits.   6/18/25 LDH: 218 (normal range). SPEP without monoclonal protein.   Iron studies within normal limits except Iron Sat 10 (low). Vitamin B12 346 (low-normal). Folate 15.0. HBV, HCV,  HIV Serologies negative. EBV viral load undetectable.   6/25/25 PET/CT: IMPRESSION: 1.  Multifocal hypermetabolic pleural-based soft tissue foci along the dependent left lower lobe, representing biopsy proven diffuse large B-cell lymphoma. Note that one of these foci was present with hypermetabolism on 9/13/2023. 2.  Persistent intense uptake within a left lower pole thyroid nodule, not significantly changed, with significant enlarged multinodular  thyroid overall. No nodule requiring biopsy found on ultrasound dated 5/30/2024. Recommend continued attention on follow-up imaging. 3.  Increased uptake within the stomach, with small hiatal hernia,  which may represent esophagogastritis. Recommend correlation patient's symptoms. 4.  Incidental CT findings, as above.  7/1/25 TTE: Global and regional left ventricular function is normal with an EF of 55-60%. The right ventricular systolic pressure is 37mmHg above the right atrial  pressure. Mild aortic stenosis is present. Severe biatrial enlargement is present.   7/14/25 Cardiology Evaluation: Propranolol started in place of Diltiazem to avoid interaction with chemotherapy. Dimple Oviedo has been diagnosed with lymphoma and has been recommended mini-R-CHOP. There is a potential drug interaction between Diltiazem and both Doxorubicin and Vincristine.      ONCOLOGIC HISTORY:Adapted from prior notes.   1. 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 S/p Right robotic nephroureterectomy, excision of sandip-caval mass and LN excision 11/13/2018. Pathologic stage [id number] (1/6 lymph nodes). Residual FDG-avid disease demonstrated on PET/CT.   --S/p Adjuvant Carboplatin/Gemcitabine chemotherapy 12/2018  --GABRIELLE of serial surveillance      2. Non-muscle invasive bladder cancer:  - 10/3/19: Cystoscopy showed a small area of erythema on retroflexion, possibly pre-existing or due to retroflexion of the scope.  Urine cytology from that time showed cells suspicious for malignancy. 1/13/20: Bladder biopsy showed high grade urothelial carcinoma in-situ  - 2/12/20-3/18/20: Intravesical BCG therapy  - 7/24/20 and last cystoscopy was on the same day. It was negative. However, urine cytology was positive for high-grade urothelial carcinoma.   - 11/25/20-12/10/2020: 3 weekly doses of intravesical BCG 50mg.   - 12/10/20: Cytology suspicious and FISH urovysion positive for TCC.    History of Present Illness:   Ms. Oviedo returns along with  her son Issa.   Her daughter joins by telephone. Dimple is feeling well.  No recent infectious episodes or concerns.     Medications:       Current Outpatient Medications   Medication Sig Dispense Refill     alendronate (FOSAMAX) 70 MG tablet TAKE 1 TABLET EVERY 7 DAYS AT LEAST 60 MINUTES BEFORE BREAKFAST AS DIRECTED. 12 tablet 3     diltiazem ER (DILT-XR) 180 MG 24 hr capsule TAKE 1 CAPSULE EVERY DAY 90 capsule 3     empagliflozin (JARDIANCE) 10 MG TABS tablet Take 1 tablet (10 mg) by mouth daily. 90 tablet 3     furosemide (LASIX) 20 MG tablet TAKE 1 TABLET TWICE DAILY IN THE MORNING AND AFTER LUNCH 180 tablet 3     irbesartan (AVAPRO) 300 MG tablet Take 1 tablet (300 mg) by mouth daily. 90 tablet 3     lovastatin (MEVACOR) 40 MG tablet TAKE 1 TABLET AT BEDTIME (SCHEDULE ANNUAL VISIT, NEED FASTING LABS) 90 tablet 3     methimazole (TAPAZOLE) 5 MG tablet TAKE 1 TABLET ALTERNATING WITH 1/2 TABLET EVERY OTHER DAY 66 tablet 3     metoprolol succinate ER (TOPROL XL) 100 MG 24 hr tablet Take 100 mg by mouth daily.       Omega-3 Fatty Acids (FISH OIL) 500 MG CAPS Take 1 capsule by mouth every morning        omeprazole (PRILOSEC) 20 MG DR capsule TAKE 1 CAPSULE EVERY DAY 90 capsule 3     RESTASIS 0.05 % ophthalmic emulsion        rOPINIRole (REQUIP) 0.25 MG tablet TAKE 1 TABLET IN THE AFTERNOON AND 2 TABLETS AT NIGHT 270 tablet 3     sertraline (ZOLOFT) 100 MG tablet Take 1 tablet (100 mg) by mouth  every morning. 90 tablet 3     traZODone (DESYREL) 50 MG tablet TAKE 1/2 TABLET AT BEDTIME 45 tablet 3     XARELTO ANTICOAGULANT 15 MG TABS tablet TAKE 1 TABLET (15 MG) BY MOUTH DAILY (WITH DINNER) 90 tablet 3     No current facility-administered medications for this visit.        Physical Exam:   /79   Pulse 83   Temp 98.1  F (36.7  C) (Oral)   Resp 20   Wt 76.7 kg (169 lb 1.6 oz)   SpO2 95%   BMI 36.59 kg/m      ECOG PS: 2  General: NAD; H&N: no mucosal lesions; Extremities: No edema; Skin: No rash; Neuro: Nonfocal; Mood/Affect: appropriate; Lymph: no LAD  Assessment and Plan:   # EBV+ Large B-cell lymphoma, Non-GC, stage II with limited non-contiguous extranodal involvement  # CKD II, eGFR 50  # Asthma  # Arthritis  # GERD  # Osteopenia  # Polymylagia rheumatica  # Stress-induced cardiomyopathy  # Hypertension  # History of high-grade, muscle-invasive, transitional cell carcinoma of right renal pelvis, stage IV ([id number])  # History of adjuvant carboplatin/gemcitabine chemotherapy  # Permanent atrial fibrillation, on chronic anticoagulation  # Tricuspid regurgitation, aortic regurgitation, CHF NYHA Class III  # Possible pulmonary hypertension     Ms. Dimple Oviedo is an 89 year old woman with a history of CKD II, muscle-invasive transitional cell carcinoma of the right renal pelvis, permanent atrial fibrillation on chronic anticoagulation. She was diagnosed with DLBCL, non-GC immunophenotype, on 6/3/25. Studies are negative for DHL/THL disease. PET/CT shows localized pleural multifocal involvement only; note that one of the hypermetabolic lesions was present in 2023, and may be related to her TCC instead. She has a thyroid nodule that is additionally hypermetabolic, but is stable and has been investigated by U/S previously. I would consider her to have stage II DLBCL based on limited involvement of a single extranodal site. IPI1 (good risk).     By the Paul-Schonberg index, her 5-year mortality is  estimated at 60%, her 10-year mortality is estimated at 93%. By CARG score is 8, implying a 59% change of severe toxicity from poly-chemotherapy. However, the life expectancy of patients with DLBCL who do not receive therapy is generally measured in months; therefore I counseled her that while chemotherapy is high risk, pursuing treatment may still be consistent with her goals of care, if she wishes to maximize her chance of cure.     We previously discussed immunochemotherapy in detail, but I again reviewed mini-R-CHOP with her today. This regimen is recommended for elderly patients by current NCCN guidelines. In prior trials, elderly patients received 6 cycles of this regimen for any stage, if tolerated well (The Lancet Oncology Volume 12, Issue 5, May 2011, Pages 460-468).  Toxicity was predominantly infectious and related to neutropenia. Deaths due to complications from chemotherapy were reported. As an alternative, I also offered localized radiation instead of systemic therapy, which might have a lower likelihood of cure, but a reasonable chance of temporary local disease control; she declined this, preferring to pursue systemic therapy.     We reviewed [facility] in great detail today, using jargon-free language. We discussed specific drugs, doses, schedule, routes, and potential side effects and toxicities: these include severe bone marrow suppression, neutropenia, infection, neutropenic fever, sepsis, anemia, bleeding, hair loss, nausea/vomiting/constipation, diarrhea, anorexia, abdominal pain, mouth pain (mucositis), rash, heart damage, liver damage, kidney damage, or damage to the nervous system. Any organ may be damaged irreversibly, and this regimen may lead to fatal complications. Patients receiving chemotherapy may experience cytopenias significant enough to necessitate blood product transfusions. Chemotherapy can increase the risk of secondary cancers, including myelodysplastic syndrome (MDS) or acute  myeloid leukemia (AML). Infusion reactions can commonly occur with Rituximab infusions. Tumor lysis syndrome can also occur and lead to dangerous electrolyte derangements. Steroids can be associated with hyperglycemia, insomnia, and a higher risk of metabolic syndrome, osteoporosis, and infectious complications.     She has a new, mild anemia -- defer BMBx for now given negative PET findings. Her iron saturation index is slightly low -- she may have slight iron deficiency in the setting of chronic anticoagulation.     PLAN:  --Mini-RCHOP with G-CSF support. Plan for 3 cycles, with repeat PET/CT and TTE at that time. May pursue ISRT consolidation as opposed to further RCHOP.   --Continue Xarelto -- may need to interrupt if thrombocytopenia develops with treatment  --Lasix, metoprolol, irbesartan, lovastatin per Cardiology    Nikunj Lazo MD, PhD  Division of Hematology, Oncology, and Transplantation  [facility]    Again, thank you for allowing me to participate in the care of your patient.        Sincerely,        Nikunj Lazo MD    Electronically signed

## 2025-07-30 NOTE — NURSING NOTE
Chief Complaint   Patient presents with    Oncology Clinic Visit     Bladder Ca    Port Draw     Labs drawn via port access by lab RN       Port blood draw with heparin flush by lab RN. Vitals taken and appointment arrived.    Gladys Power RN

## 2025-07-30 NOTE — NURSING NOTE
"Oncology Rooming Note    July 30, 2025 9:32 AM   Dimple Oviedo is a 89 year old female who presents for:    Chief Complaint   Patient presents with    Oncology Clinic Visit     Bladder Ca    Port Draw     Labs drawn via port access by lab RN     Initial Vitals: /79   Pulse 83   Temp 98.1  F (36.7  C) (Oral)   Resp 20   Wt 76.7 kg (169 lb 1.6 oz)   SpO2 95%   BMI 36.59 kg/m   Estimated body mass index is 36.59 kg/m  as calculated from the following:    Height as of 7/24/25: 1.448 m (4' 9\").    Weight as of this encounter: 76.7 kg (169 lb 1.6 oz). Body surface area is 1.76 meters squared.  No Pain (0) Comment: Data Unavailable   No LMP recorded. Patient is postmenopausal.  Allergies reviewed: Yes  Medications reviewed: Yes    Medications: Medication refills not needed today.  Pharmacy name entered into [facility]:    [facility] - [facility] [address] [facility] [place] [facility] [place] - [address] [facility] [phone]  [id number] - [place], [place] - [address] AT [facility] [address]  [facility] [address] [place] [address]    PHQ9:  Did this patient require a PHQ9?: No      Clinical concerns:  Patient states there are no new concerns to discuss with provider.  Dr Lazo was not notified.        Macey Forbes CMA              "

## 2025-08-01 ENCOUNTER — PATIENT OUTREACH (OUTPATIENT)
Dept: ONCOLOGY | Facility: CLINIC | Age: OVER 89
End: 2025-08-01

## 2025-08-04 ENCOUNTER — PATIENT OUTREACH (OUTPATIENT)
Dept: ONCOLOGY | Facility: CLINIC | Age: OVER 89
End: 2025-08-04
Payer: MEDICARE

## 2025-08-12 ENCOUNTER — ONCOLOGY VISIT (OUTPATIENT)
Dept: ONCOLOGY | Facility: CLINIC | Age: OVER 89
End: 2025-08-12
Attending: NURSE PRACTITIONER
Payer: MEDICARE

## 2025-08-12 VITALS
WEIGHT: 174.6 LBS | DIASTOLIC BLOOD PRESSURE: 74 MMHG | BODY MASS INDEX: 36.16 KG/M2 | SYSTOLIC BLOOD PRESSURE: 112 MMHG | HEART RATE: 85 BPM | OXYGEN SATURATION: 94 % | TEMPERATURE: 97.2 F | RESPIRATION RATE: 20 BRPM

## 2025-08-12 DIAGNOSIS — C83.38 DIFFUSE LARGE B-CELL LYMPHOMA OF LYMPH NODES OF MULTIPLE REGIONS (H): ICD-10-CM

## 2025-08-12 LAB
ALBUMIN SERPL BCG-MCNC: 3.7 G/DL (ref 3.5–5.2)
ALP SERPL-CCNC: 136 U/L (ref 40–150)
ALT SERPL W P-5'-P-CCNC: 21 U/L (ref 0–50)
ANION GAP SERPL CALCULATED.3IONS-SCNC: 12 MMOL/L (ref 7–15)
AST SERPL W P-5'-P-CCNC: 27 U/L (ref 0–45)
BASOPHILS # BLD AUTO: 0.1 10E3/UL (ref 0–0.2)
BASOPHILS NFR BLD AUTO: 1 %
BILIRUB SERPL-MCNC: 0.5 MG/DL
BUN SERPL-MCNC: 14.5 MG/DL (ref 8–23)
CALCIUM SERPL-MCNC: 9 MG/DL (ref 8.8–10.4)
CHLORIDE SERPL-SCNC: 97 MMOL/L (ref 98–107)
CREAT SERPL-MCNC: 0.96 MG/DL (ref 0.51–0.95)
EGFRCR SERPLBLD CKD-EPI 2021: 55 ML/MIN/1.73M2
EOSINOPHIL # BLD AUTO: 0.1 10E3/UL (ref 0–0.7)
EOSINOPHIL NFR BLD AUTO: 0 %
ERYTHROCYTE [DISTWIDTH] IN BLOOD BY AUTOMATED COUNT: 17.7 % (ref 10–15)
GLUCOSE SERPL-MCNC: 173 MG/DL (ref 70–99)
HCO3 SERPL-SCNC: 29 MMOL/L (ref 22–29)
HCT VFR BLD AUTO: 33.9 % (ref 35–47)
HGB BLD-MCNC: 10.3 G/DL (ref 11.7–15.7)
IMM GRANULOCYTES # BLD: 0.6 10E3/UL
IMM GRANULOCYTES NFR BLD: 5 %
LYMPHOCYTES # BLD AUTO: 0.9 10E3/UL (ref 0.8–5.3)
LYMPHOCYTES NFR BLD AUTO: 8 %
MCH RBC QN AUTO: 25.4 PG (ref 26.5–33)
MCHC RBC AUTO-ENTMCNC: 30.4 G/DL (ref 31.5–36.5)
MCV RBC AUTO: 84 FL (ref 78–100)
MONOCYTES # BLD AUTO: 0.7 10E3/UL (ref 0–1.3)
MONOCYTES NFR BLD AUTO: 7 %
NEUTROPHILS # BLD AUTO: 9 10E3/UL (ref 1.6–8.3)
NEUTROPHILS NFR BLD AUTO: 80 %
NRBC # BLD AUTO: 0.1 10E3/UL
NRBC BLD AUTO-RTO: 1 /100
PLAT MORPH BLD: ABNORMAL
PLATELET # BLD AUTO: 161 10E3/UL (ref 150–450)
POLYCHROMASIA BLD QL SMEAR: SLIGHT
POTASSIUM SERPL-SCNC: 4.1 MMOL/L (ref 3.4–5.3)
PROT SERPL-MCNC: 6.5 G/DL (ref 6.4–8.3)
RBC # BLD AUTO: 4.05 10E6/UL (ref 3.8–5.2)
RBC MORPH BLD: ABNORMAL
SODIUM SERPL-SCNC: 138 MMOL/L (ref 135–145)
WBC # BLD AUTO: 11.3 10E3/UL (ref 4–11)

## 2025-08-12 PROCEDURE — 99215 OFFICE O/P EST HI 40 MIN: CPT | Performed by: NURSE PRACTITIONER

## 2025-08-12 PROCEDURE — 80053 COMPREHEN METABOLIC PANEL: CPT | Performed by: NURSE PRACTITIONER

## 2025-08-12 PROCEDURE — G0463 HOSPITAL OUTPT CLINIC VISIT: HCPCS | Performed by: NURSE PRACTITIONER

## 2025-08-12 PROCEDURE — 36591 DRAW BLOOD OFF VENOUS DEVICE: CPT | Performed by: NURSE PRACTITIONER

## 2025-08-12 PROCEDURE — 85025 COMPLETE CBC W/AUTO DIFF WBC: CPT | Performed by: NURSE PRACTITIONER

## 2025-08-12 PROCEDURE — 250N000011 HC RX IP 250 OP 636: Performed by: NURSE PRACTITIONER

## 2025-08-12 PROCEDURE — G2211 COMPLEX E/M VISIT ADD ON: HCPCS | Performed by: NURSE PRACTITIONER

## 2025-08-12 RX ORDER — HEPARIN SODIUM (PORCINE) LOCK FLUSH IV SOLN 100 UNIT/ML 100 UNIT/ML
5 SOLUTION INTRAVENOUS ONCE
Status: COMPLETED | OUTPATIENT
Start: 2025-08-12 | End: 2025-08-12

## 2025-08-12 RX ADMIN — Medication 5 ML: at 09:33

## 2025-08-12 ASSESSMENT — PAIN SCALES - GENERAL: PAINLEVEL_OUTOF10: NO PAIN (0)

## 2025-08-20 ENCOUNTER — VIRTUAL VISIT (OUTPATIENT)
Dept: ONCOLOGY | Facility: CLINIC | Age: OVER 89
End: 2025-08-20
Attending: INTERNAL MEDICINE
Payer: MEDICARE

## 2025-08-20 VITALS — BODY MASS INDEX: 37.76 KG/M2 | WEIGHT: 175 LBS | HEIGHT: 57 IN

## 2025-08-20 DIAGNOSIS — Z51.11 ENCOUNTER FOR ANTINEOPLASTIC CHEMOTHERAPY: Primary | ICD-10-CM

## 2025-08-20 DIAGNOSIS — C83.38 DIFFUSE LARGE B-CELL LYMPHOMA OF LYMPH NODES OF MULTIPLE REGIONS (H): ICD-10-CM

## 2025-08-20 PROCEDURE — 98006 SYNCH AUDIO-VIDEO EST MOD 30: CPT | Performed by: NURSE PRACTITIONER

## 2025-08-20 PROCEDURE — 1126F AMNT PAIN NOTED NONE PRSNT: CPT | Mod: 95 | Performed by: NURSE PRACTITIONER

## 2025-08-20 PROCEDURE — G2211 COMPLEX E/M VISIT ADD ON: HCPCS | Performed by: NURSE PRACTITIONER

## 2025-08-20 RX ORDER — ALBUTEROL SULFATE 90 UG/1
1-2 INHALANT RESPIRATORY (INHALATION)
Start: 2025-08-22

## 2025-08-20 RX ORDER — METHYLPREDNISOLONE SODIUM SUCCINATE 125 MG/2ML
125 INJECTION INTRAMUSCULAR; INTRAVENOUS
Start: 2025-08-22

## 2025-08-20 RX ORDER — DIPHENHYDRAMINE HYDROCHLORIDE 50 MG/ML
50 INJECTION, SOLUTION INTRAMUSCULAR; INTRAVENOUS
Start: 2025-08-22

## 2025-08-20 RX ORDER — PREDNISONE 50 MG/1
80 TABLET ORAL DAILY
Qty: 10 TABLET | Refills: 2 | Status: SHIPPED | OUTPATIENT
Start: 2025-08-20

## 2025-08-20 RX ORDER — HEPARIN SODIUM (PORCINE) LOCK FLUSH IV SOLN 100 UNIT/ML 100 UNIT/ML
5 SOLUTION INTRAVENOUS
OUTPATIENT
Start: 2025-08-22

## 2025-08-20 RX ORDER — ACETAMINOPHEN 325 MG/1
650 TABLET ORAL ONCE
Start: 2025-08-22

## 2025-08-20 RX ORDER — MEPERIDINE HYDROCHLORIDE 25 MG/ML
25 INJECTION INTRAMUSCULAR; INTRAVENOUS; SUBCUTANEOUS
Start: 2025-08-22

## 2025-08-20 RX ORDER — DIPHENHYDRAMINE HYDROCHLORIDE 50 MG/ML
50 INJECTION, SOLUTION INTRAMUSCULAR; INTRAVENOUS ONCE
OUTPATIENT
Start: 2025-08-22

## 2025-08-20 RX ORDER — DIPHENHYDRAMINE HCL 25 MG
50 CAPSULE ORAL ONCE
Start: 2025-08-22

## 2025-08-20 RX ORDER — METHYLPREDNISOLONE SODIUM SUCCINATE 40 MG/ML
40 INJECTION INTRAMUSCULAR; INTRAVENOUS
Start: 2025-08-22

## 2025-08-20 RX ORDER — MEPERIDINE HYDROCHLORIDE 25 MG/ML
25 INJECTION INTRAMUSCULAR; INTRAVENOUS; SUBCUTANEOUS
OUTPATIENT
Start: 2025-08-22

## 2025-08-20 RX ORDER — DOXORUBICIN HYDROCHLORIDE 2 MG/ML
25 INJECTION, SOLUTION INTRAVENOUS ONCE
OUTPATIENT
Start: 2025-08-22

## 2025-08-20 RX ORDER — LORAZEPAM 2 MG/ML
0.5 INJECTION INTRAMUSCULAR EVERY 4 HOURS PRN
OUTPATIENT
Start: 2025-08-22

## 2025-08-20 RX ORDER — DIPHENHYDRAMINE HYDROCHLORIDE 50 MG/ML
25 INJECTION, SOLUTION INTRAMUSCULAR; INTRAVENOUS
Start: 2025-08-22

## 2025-08-20 RX ORDER — EPINEPHRINE 1 MG/ML
0.3 INJECTION, SOLUTION INTRAMUSCULAR; SUBCUTANEOUS EVERY 5 MIN PRN
OUTPATIENT
Start: 2025-08-22

## 2025-08-20 RX ORDER — HEPARIN SODIUM,PORCINE 10 UNIT/ML
5-20 VIAL (ML) INTRAVENOUS DAILY PRN
OUTPATIENT
Start: 2025-08-22

## 2025-08-20 RX ORDER — PALONOSETRON 0.05 MG/ML
0.25 INJECTION, SOLUTION INTRAVENOUS ONCE
Start: 2025-08-22

## 2025-08-20 RX ORDER — ALBUTEROL SULFATE 0.83 MG/ML
2.5 SOLUTION RESPIRATORY (INHALATION)
OUTPATIENT
Start: 2025-08-22

## 2025-08-20 ASSESSMENT — PAIN SCALES - GENERAL: PAINLEVEL_OUTOF10: NO PAIN (0)

## 2025-08-22 ENCOUNTER — APPOINTMENT (OUTPATIENT)
Dept: LAB | Facility: CLINIC | Age: OVER 89
End: 2025-08-22
Payer: MEDICARE

## 2025-09-02 ENCOUNTER — TELEPHONE (OUTPATIENT)
Dept: CARDIOLOGY | Facility: CLINIC | Age: OVER 89
End: 2025-09-02
Payer: MEDICARE

## 2025-09-04 ENCOUNTER — TELEPHONE (OUTPATIENT)
Dept: CARDIOLOGY | Facility: CLINIC | Age: OVER 89
End: 2025-09-04
Payer: MEDICARE

## (undated) DEVICE — GLOVE PROTEXIS BLUE W/NEU-THERA 8.0  2D73EB80

## (undated) DEVICE — RAD RX CONRAY 60% (50ML) CHARGE PER ML

## (undated) DEVICE — BRUSH BIOPSY SET 3.2FRX115CM G14919 040310

## (undated) DEVICE — CATH URETERAL OPEN END 5FRX70CM M0064002010

## (undated) DEVICE — SU PDS II 1 CTX 36" Z371T

## (undated) DEVICE — GOWN XLG DISP 9545

## (undated) DEVICE — SPECIMEN CONTAINER 3OZ W/FORMALIN 59901

## (undated) DEVICE — SU VICRYL 3-0 SH 27" J316H

## (undated) DEVICE — DAVINCI XI GRASPER ENDOWRIST PROGRASP 470093

## (undated) DEVICE — PAD CHUX UNDERPAD 23X24" 7136

## (undated) DEVICE — DRSG TELFA 3X8" 1238

## (undated) DEVICE — DRAPE IOBAN INCISE 23X17" 6650EZ

## (undated) DEVICE — STRAP KNEE/BODY 31143004

## (undated) DEVICE — DRAPE C-ARM W/STRAPS 42X72" 07-CA104

## (undated) DEVICE — LINEN TOWEL PACK X5 5464

## (undated) DEVICE — BASIN SET MAJOR

## (undated) DEVICE — Device

## (undated) DEVICE — SU STRATAFIX PDS PLUS 1 CT-1 18" SXPP1A404

## (undated) DEVICE — PAD CHUX UNDERPAD 30X36" P3036C

## (undated) DEVICE — ESU GROUND PAD ADULT W/CORD E7507

## (undated) DEVICE — CONNECTOR MALE TO MALE LL

## (undated) DEVICE — ENDO SEAL BX PORT BPS-A

## (undated) DEVICE — PUMP SYSTEM SINGLE ACTION M0067201000

## (undated) DEVICE — SU PDS II 0 CT-1 27" Z340H

## (undated) DEVICE — PAD CHUX UNDERPAD 30X30"

## (undated) DEVICE — KIT ENDO TURNOVER/PROCEDURE CARRY-ON 101822

## (undated) DEVICE — DAVINCI XI DRAPE ARM 470015

## (undated) DEVICE — LINEN GOWN X4 5410

## (undated) DEVICE — SOL WATER IRRIG 1000ML BOTTLE 2F7114

## (undated) DEVICE — EYE PREP BETADINE 5% SOLUTION 30ML 0065-0411-30

## (undated) DEVICE — CATH URETERAL OPEN END 05FR 70CM 020015

## (undated) DEVICE — ADH SKIN CLOSURE PREMIERPRO EXOFIN 1.0ML 3470

## (undated) DEVICE — PACK DAVINCI UROL

## (undated) DEVICE — BLADE SAW SAGITTAL STRK 18X90X1.27MM HD SYS 6 6118-127-090

## (undated) DEVICE — SUCTION MANIFOLD DORNOCH ULTRA CART UL-CL500

## (undated) DEVICE — WIPES FOLEY CARE SURESTEP PROVON DFC100

## (undated) DEVICE — NDL BLUNT 18GA 1" W/O FILTER 305181

## (undated) DEVICE — TUBING CONMED AIRSEAL SMOKE EVAC INSUFFLATION ASM-EVAC

## (undated) DEVICE — SOL NACL 0.9% IRRIG 1000ML BOTTLE 2F7124

## (undated) DEVICE — SPECIMEN CONTAINER 5OZ STERILE 2600SA

## (undated) DEVICE — SYR 10ML LL W/O NDL

## (undated) DEVICE — HOOD FLYTE W/PEELAWAY 408-800-100

## (undated) DEVICE — DAVINCI XI FCP BIPOLAR FENESTRATED 470205

## (undated) DEVICE — GOWN IMPERVIOUS 2XL BLUE

## (undated) DEVICE — SOL NACL 0.9% IRRIG 3000ML BAG 2B7477

## (undated) DEVICE — GUIDEWIRE SENSOR DUAL FLEX STR 0.035"X150CM M0066703080

## (undated) DEVICE — GLOVE PROTEXIS W/NEU-THERA 6.5  2D73TE65

## (undated) DEVICE — CATH URETERAL CONE 08FR 70CM 138008LT / 138008RT

## (undated) DEVICE — GLOVE PROTEXIS W/NEU-THERA 8.0  2D73TE80

## (undated) DEVICE — BASKET NITINOL TIPLESS HALO  1.5FRX120CM 554120

## (undated) DEVICE — PACK GOWN 3/PK DISP XL SBA32GPFCB

## (undated) DEVICE — CONNECTOR WATER VALVE PERFUSION PACK STR 020272801

## (undated) DEVICE — SOL NACL 0.9% INJ 1000ML BAG 2B1324X

## (undated) DEVICE — GUIDEWIRE AMPLATZ SUPER STIFF .035 X 145CM M0066401080

## (undated) DEVICE — DECANTER BAG 2002S

## (undated) DEVICE — KNIFE HANDLE W/15 BLADE 371615

## (undated) DEVICE — DRAPE U-DRAPE 1015NSD NON-STERILE

## (undated) DEVICE — TOURNIQUET CUFF 34" REPRO BROWN 60-7070-106

## (undated) DEVICE — PREP CHLORAPREP 26ML TINTED ORANGE  260815

## (undated) DEVICE — DRAPE GYN/UROLOGY FLUID POUCH TUR 29455

## (undated) DEVICE — SOL WATER IRRIG 3000ML BAG 2B7117

## (undated) DEVICE — GLOVE PROTEXIS W/NEU-THERA 8.5  2D73TE85

## (undated) DEVICE — DRSG GAUZE 4X4" TRAY 6939

## (undated) DEVICE — GOWN IMPERVIOUS SPECIALTY XLG/XLONG 32474

## (undated) DEVICE — PREP POVIDONE IODINE SOLUTION 10% 4OZ

## (undated) DEVICE — DRAPE IOBAN INCISE 13X13" 6640EZ

## (undated) DEVICE — JELLY LUBRICATING SURGILUBE 2OZ TUBE

## (undated) DEVICE — COVER ULTRASOUND PROBE W/GEL FLEXI-FEEL 6"X58" LF  25-FF658

## (undated) DEVICE — CATH TRAY FOLEY SURESTEP 16FR WDRAIN BAG STLK LATEX A300316A

## (undated) DEVICE — STPL POWERED ECHELON 45MM PSEE45A

## (undated) DEVICE — GLOVE PROTEXIS W/NEU-THERA 7.5  2D73TE75

## (undated) DEVICE — GLOVE PROTEXIS POWDER FREE SMT 7.0  2D72PT70X

## (undated) DEVICE — GUIDEWIRE AMPLATZ 0.035"X145CM  SUPER STIFF 640-104

## (undated) DEVICE — SYR 10ML LL W/O NDL 302995

## (undated) DEVICE — BONE CEMENT MIXEVAC III HI VAC KIT  0206-015-000

## (undated) DEVICE — PREP POVIDONE IODINE SCRUB 7.5% 4OZ APL82212

## (undated) DEVICE — BLADE SAW RECIP STRK 70X12.5X1.2MM 0277-096-281

## (undated) DEVICE — PACK CENTRAL LINE INSERTION SAN32CLFCG

## (undated) DEVICE — SOL NACL 0.9% INJ 250ML BAG 2B1322Q

## (undated) DEVICE — ENDO SEAL BX PORT GREEN CS-W7S

## (undated) DEVICE — DAVINCI XI NDL DRIVER LARGE 470006

## (undated) DEVICE — SU PROLENE 4-0 RB-1DA 18" 8757H

## (undated) DEVICE — DRSG TEGADERM 4X4 3/4" 1626W

## (undated) DEVICE — KIT ENDO FIRST STEP DISINFECTANT 200ML W/POUCH EP-4

## (undated) DEVICE — NDL SPINAL 20GA 3.5" 405182

## (undated) DEVICE — GLOVE PROTEXIS POWDER FREE SMT 7.5  2D72PT75X

## (undated) DEVICE — LINEN BACK PACK 5440

## (undated) DEVICE — LIGHT HANDLE X1 31140133

## (undated) DEVICE — GLOVE PROTEXIS BLUE W/NEU-THERA 8.5  2D73EB85

## (undated) DEVICE — DRAPE U SPLIT 74X120" 29440

## (undated) DEVICE — SYR 30ML LL W/O NDL 302832

## (undated) DEVICE — DAVINCI XI DRAPE COLUMN 470341

## (undated) DEVICE — SU MONOCRYL 4-0 P-3 18" UND Y494G

## (undated) DEVICE — DAVINCI XI SEAL UNIVERSAL 5-8MM 470361

## (undated) DEVICE — STRAP UNIVERSAL POSITIONING 2-PIECE 4X47X76" 91-287

## (undated) DEVICE — GLOVE PROTEXIS POWDER FREE 8.0 ORTHOPEDIC 2D73ET80

## (undated) DEVICE — PACK CYSTO UMMC CUSTOM

## (undated) DEVICE — SU MONOCRYL 4-0 PS-2 27" UND Y426H

## (undated) DEVICE — ENDO POUCH UNIVERSAL RETRIEVAL SYSTEM INZII 12/15MM CD004

## (undated) DEVICE — SU VICRYL 0 UR-6 27" J603H

## (undated) DEVICE — SU MONOCRYL 3-0 PS-1 27" Y936H

## (undated) DEVICE — KIT INTRODUCER FLUENT MICRO 5FRX10CM ECHO TIP KIT-038-04

## (undated) DEVICE — SPECIMEN CONTAINER W/10% BUFFERED FORMALIN 120ML 591201

## (undated) DEVICE — BLADE SWITCH SCISSORS TIP 5MM 89-5100B

## (undated) DEVICE — ESU PENCIL W/SMOKE EVAC NEPTUNE STRYKER 0703-046-000

## (undated) DEVICE — DRAPE MAYO STAND 23X54 8337

## (undated) DEVICE — SUCTION MANIFOLD NEPTUNE 2 SYS 4 PORT 0702-020-000

## (undated) DEVICE — PREP CHLORAPREP 26ML TINTED HI-LITE ORANGE 930815

## (undated) DEVICE — LINEN TOWEL PACK X6 WHITE 5487

## (undated) DEVICE — TUBING SUCTION 12"X1/4" N612

## (undated) DEVICE — LINEN GOWN XLG 5407

## (undated) DEVICE — SU VICRYL 3-0 SH 27" UND J416H

## (undated) DEVICE — JELLY LUBRICATING SURGILUBE 2OZ TUBE 0281-0205-02

## (undated) DEVICE — WIRE GUIDE 0.035"X145CM BENTSON TSFB-35-145-BH

## (undated) DEVICE — SU PROLENE 4-0 SHDA 36" 8521H

## (undated) DEVICE — DAVINCI XI MONOPOLAR SCISSORS HOT SHEARS 8MM 470179

## (undated) DEVICE — CLIP ENDO HEMO-LOC PURPLE LG 544240

## (undated) DEVICE — LINEN TOWEL PACK X30 5481

## (undated) DEVICE — SU DERMABOND ADVANCED .7ML DNX12

## (undated) DEVICE — DRSG TEGADERM ALGINATE AG 4X5" 90303

## (undated) DEVICE — SOL WATER IRRIG 500ML BOTTLE 2F7113

## (undated) DEVICE — PACK CYSTO CUSTOM ASC

## (undated) DEVICE — SUCTION IRR SYSTEM W/O TIP INTERPULSE HANDPIECE 0210-100-000

## (undated) DEVICE — SYR INFLATION DEVICE 20ML W/ 26GA TORQUE DEVICE M001151062

## (undated) DEVICE — SU STRATAFIXÂ PDO 2-0 24CMX24CM MH DA SXPD2B408

## (undated) DEVICE — ENDO TROCAR CONMED AIRSEAL BLADELESS 12X120MM IAS12-120LP

## (undated) DEVICE — BONE CLEANING TIP INTERPULSE  0210-010-000

## (undated) DEVICE — TUBING IRR TUR Y TYPE 2C4041

## (undated) DEVICE — SUCTION MANIFOLD NEPTUNE 2 SYS 1 PORT 702-025-000

## (undated) DEVICE — DAVINCI HOT SHEARS TIP COVER  400180

## (undated) RX ORDER — LIDOCAINE HYDROCHLORIDE 20 MG/ML
INJECTION, SOLUTION EPIDURAL; INFILTRATION; INTRACAUDAL; PERINEURAL
Status: DISPENSED
Start: 2020-11-09

## (undated) RX ORDER — ONDANSETRON 2 MG/ML
INJECTION INTRAMUSCULAR; INTRAVENOUS
Status: DISPENSED
Start: 2018-11-13

## (undated) RX ORDER — HEPARIN SODIUM (PORCINE) LOCK FLUSH IV SOLN 100 UNIT/ML 100 UNIT/ML
SOLUTION INTRAVENOUS
Status: DISPENSED
Start: 2019-03-01

## (undated) RX ORDER — ACETAMINOPHEN 325 MG/1
TABLET ORAL
Status: DISPENSED
Start: 2020-11-09

## (undated) RX ORDER — GLYCOPYRROLATE 0.2 MG/ML
INJECTION, SOLUTION INTRAMUSCULAR; INTRAVENOUS
Status: DISPENSED
Start: 2018-04-03

## (undated) RX ORDER — ACETAMINOPHEN 325 MG/1
TABLET ORAL
Status: DISPENSED
Start: 2018-09-10

## (undated) RX ORDER — FENTANYL CITRATE 50 UG/ML
INJECTION, SOLUTION INTRAMUSCULAR; INTRAVENOUS
Status: DISPENSED
Start: 2018-11-13

## (undated) RX ORDER — ONDANSETRON 2 MG/ML
INJECTION INTRAMUSCULAR; INTRAVENOUS
Status: DISPENSED
Start: 2025-07-24

## (undated) RX ORDER — CEFAZOLIN SODIUM 2 G/100ML
INJECTION, SOLUTION INTRAVENOUS
Status: DISPENSED
Start: 2020-11-09

## (undated) RX ORDER — CEFAZOLIN SODIUM 2 G/100ML
INJECTION, SOLUTION INTRAVENOUS
Status: DISPENSED
Start: 2018-04-03

## (undated) RX ORDER — TRANEXAMIC ACID 650 MG/1
TABLET ORAL
Status: DISPENSED
Start: 2020-11-09

## (undated) RX ORDER — ONDANSETRON 2 MG/ML
INJECTION INTRAMUSCULAR; INTRAVENOUS
Status: DISPENSED
Start: 2020-01-13

## (undated) RX ORDER — FENTANYL CITRATE 50 UG/ML
INJECTION, SOLUTION INTRAMUSCULAR; INTRAVENOUS
Status: DISPENSED
Start: 2018-04-03

## (undated) RX ORDER — CALCIUM CHLORIDE 100 MG/ML
INJECTION INTRAVENOUS; INTRAVENTRICULAR
Status: DISPENSED
Start: 2018-04-03

## (undated) RX ORDER — FENTANYL CITRATE 50 UG/ML
INJECTION, SOLUTION INTRAMUSCULAR; INTRAVENOUS
Status: DISPENSED
Start: 2020-01-13

## (undated) RX ORDER — PROPOFOL 10 MG/ML
INJECTION, EMULSION INTRAVENOUS
Status: DISPENSED
Start: 2018-11-13

## (undated) RX ORDER — LIDOCAINE HYDROCHLORIDE 20 MG/ML
JELLY TOPICAL
Status: DISPENSED
Start: 2021-11-12

## (undated) RX ORDER — ONDANSETRON 2 MG/ML
INJECTION INTRAMUSCULAR; INTRAVENOUS
Status: DISPENSED
Start: 2020-11-09

## (undated) RX ORDER — FENTANYL CITRATE 50 UG/ML
INJECTION, SOLUTION INTRAMUSCULAR; INTRAVENOUS
Status: DISPENSED
Start: 2018-02-19

## (undated) RX ORDER — LIDOCAINE HYDROCHLORIDE 20 MG/ML
JELLY TOPICAL
Status: DISPENSED
Start: 2021-08-13

## (undated) RX ORDER — HYDROMORPHONE HCL/0.9% NACL/PF 0.2MG/0.2
SYRINGE (ML) INTRAVENOUS
Status: DISPENSED
Start: 2018-11-13

## (undated) RX ORDER — FENTANYL CITRATE 50 UG/ML
INJECTION, SOLUTION INTRAMUSCULAR; INTRAVENOUS
Status: DISPENSED
Start: 2023-11-29

## (undated) RX ORDER — LIDOCAINE HYDROCHLORIDE 10 MG/ML
INJECTION, SOLUTION EPIDURAL; INFILTRATION; INTRACAUDAL; PERINEURAL
Status: DISPENSED
Start: 2025-06-03

## (undated) RX ORDER — LIDOCAINE HYDROCHLORIDE 10 MG/ML
INJECTION, SOLUTION EPIDURAL; INFILTRATION; INTRACAUDAL; PERINEURAL
Status: DISPENSED
Start: 2025-06-26

## (undated) RX ORDER — LIDOCAINE HYDROCHLORIDE 20 MG/ML
JELLY TOPICAL
Status: DISPENSED
Start: 2025-04-30

## (undated) RX ORDER — CEFAZOLIN SODIUM 2 G/50ML
SOLUTION INTRAVENOUS
Status: DISPENSED
Start: 2025-07-24

## (undated) RX ORDER — FENTANYL CITRATE 50 UG/ML
INJECTION, SOLUTION INTRAMUSCULAR; INTRAVENOUS
Status: DISPENSED
Start: 2018-09-12

## (undated) RX ORDER — CIPROFLOXACIN 500 MG/1
TABLET, FILM COATED ORAL
Status: DISPENSED
Start: 2021-04-30

## (undated) RX ORDER — LIDOCAINE HYDROCHLORIDE 20 MG/ML
INJECTION, SOLUTION EPIDURAL; INFILTRATION; INTRACAUDAL; PERINEURAL
Status: DISPENSED
Start: 2018-09-10

## (undated) RX ORDER — CIPROFLOXACIN 500 MG/1
TABLET, FILM COATED ORAL
Status: DISPENSED
Start: 2020-04-30

## (undated) RX ORDER — DEXAMETHASONE SODIUM PHOSPHATE 4 MG/ML
INJECTION, SOLUTION INTRA-ARTICULAR; INTRALESIONAL; INTRAMUSCULAR; INTRAVENOUS; SOFT TISSUE
Status: DISPENSED
Start: 2018-02-19

## (undated) RX ORDER — CELECOXIB 200 MG/1
CAPSULE ORAL
Status: DISPENSED
Start: 2020-11-09

## (undated) RX ORDER — DEXAMETHASONE SODIUM PHOSPHATE 4 MG/ML
INJECTION, SOLUTION INTRA-ARTICULAR; INTRALESIONAL; INTRAMUSCULAR; INTRAVENOUS; SOFT TISSUE
Status: DISPENSED
Start: 2020-01-13

## (undated) RX ORDER — LIDOCAINE HYDROCHLORIDE 20 MG/ML
INJECTION, SOLUTION EPIDURAL; INFILTRATION; INTRACAUDAL; PERINEURAL
Status: DISPENSED
Start: 2018-11-13

## (undated) RX ORDER — PHENYLEPHRINE HCL IN 0.9% NACL 1 MG/10 ML
SYRINGE (ML) INTRAVENOUS
Status: DISPENSED
Start: 2018-09-10

## (undated) RX ORDER — HEPARIN SODIUM (PORCINE) LOCK FLUSH IV SOLN 100 UNIT/ML 100 UNIT/ML
SOLUTION INTRAVENOUS
Status: DISPENSED
Start: 2019-04-29

## (undated) RX ORDER — FENTANYL CITRATE 50 UG/ML
INJECTION, SOLUTION INTRAMUSCULAR; INTRAVENOUS
Status: DISPENSED
Start: 2018-09-10

## (undated) RX ORDER — HEPARIN SODIUM (PORCINE) LOCK FLUSH IV SOLN 100 UNIT/ML 100 UNIT/ML
SOLUTION INTRAVENOUS
Status: DISPENSED
Start: 2019-01-22

## (undated) RX ORDER — HEPARIN SODIUM 200 [USP'U]/100ML
INJECTION, SOLUTION INTRAVENOUS
Status: DISPENSED
Start: 2025-04-30

## (undated) RX ORDER — LIDOCAINE HYDROCHLORIDE 20 MG/ML
INJECTION, SOLUTION EPIDURAL; INFILTRATION; INTRACAUDAL; PERINEURAL
Status: DISPENSED
Start: 2018-02-19

## (undated) RX ORDER — HEPARIN SODIUM,PORCINE 10 UNIT/ML
VIAL (ML) INTRAVENOUS
Status: DISPENSED
Start: 2018-12-19

## (undated) RX ORDER — SIMETHICONE 40MG/0.6ML
SUSPENSION, DROPS(FINAL DOSAGE FORM)(ML) ORAL
Status: DISPENSED
Start: 2018-12-18

## (undated) RX ORDER — FENTANYL CITRATE-0.9 % NACL/PF 10 MCG/ML
PLASTIC BAG, INJECTION (ML) INTRAVENOUS
Status: DISPENSED
Start: 2020-08-26

## (undated) RX ORDER — CIPROFLOXACIN 500 MG/1
TABLET, FILM COATED ORAL
Status: DISPENSED
Start: 2018-11-19

## (undated) RX ORDER — CEFAZOLIN SODIUM 1 G/3ML
INJECTION, POWDER, FOR SOLUTION INTRAMUSCULAR; INTRAVENOUS
Status: DISPENSED
Start: 2018-11-13

## (undated) RX ORDER — HYDROMORPHONE HYDROCHLORIDE 1 MG/ML
INJECTION, SOLUTION INTRAMUSCULAR; INTRAVENOUS; SUBCUTANEOUS
Status: DISPENSED
Start: 2018-11-13

## (undated) RX ORDER — EPHEDRINE SULFATE 50 MG/ML
INJECTION, SOLUTION INTRAMUSCULAR; INTRAVENOUS; SUBCUTANEOUS
Status: DISPENSED
Start: 2018-04-03

## (undated) RX ORDER — CEFAZOLIN SODIUM 2 G/100ML
INJECTION, SOLUTION INTRAVENOUS
Status: DISPENSED
Start: 2018-11-13

## (undated) RX ORDER — PROPOFOL 10 MG/ML
INJECTION, EMULSION INTRAVENOUS
Status: DISPENSED
Start: 2020-01-13

## (undated) RX ORDER — ACETAMINOPHEN 325 MG/1
TABLET ORAL
Status: DISPENSED
Start: 2018-12-19

## (undated) RX ORDER — CEFAZOLIN SODIUM 2 G/100ML
INJECTION, SOLUTION INTRAVENOUS
Status: DISPENSED
Start: 2018-02-19

## (undated) RX ORDER — ONDANSETRON 2 MG/ML
INJECTION INTRAMUSCULAR; INTRAVENOUS
Status: DISPENSED
Start: 2018-09-10

## (undated) RX ORDER — HYDROMORPHONE HYDROCHLORIDE 1 MG/ML
INJECTION, SOLUTION INTRAMUSCULAR; INTRAVENOUS; SUBCUTANEOUS
Status: DISPENSED
Start: 2020-08-26

## (undated) RX ORDER — ACETAMINOPHEN 325 MG/1
TABLET ORAL
Status: DISPENSED
Start: 2019-06-26

## (undated) RX ORDER — EPHEDRINE SULFATE 50 MG/ML
INJECTION, SOLUTION INTRAMUSCULAR; INTRAVENOUS; SUBCUTANEOUS
Status: DISPENSED
Start: 2018-11-13

## (undated) RX ORDER — LIDOCAINE HYDROCHLORIDE 20 MG/ML
INJECTION, SOLUTION EPIDURAL; INFILTRATION; INTRACAUDAL; PERINEURAL
Status: DISPENSED
Start: 2020-08-26

## (undated) RX ORDER — SULFAMETHOXAZOLE/TRIMETHOPRIM 800-160 MG
TABLET ORAL
Status: DISPENSED
Start: 2018-02-01

## (undated) RX ORDER — CEFAZOLIN SODIUM 2 G/50ML
SOLUTION INTRAVENOUS
Status: DISPENSED
Start: 2018-12-19

## (undated) RX ORDER — ONDANSETRON 2 MG/ML
INJECTION INTRAMUSCULAR; INTRAVENOUS
Status: DISPENSED
Start: 2018-04-03

## (undated) RX ORDER — HYDRALAZINE HYDROCHLORIDE 20 MG/ML
INJECTION INTRAMUSCULAR; INTRAVENOUS
Status: DISPENSED
Start: 2020-11-09

## (undated) RX ORDER — LIDOCAINE HYDROCHLORIDE 20 MG/ML
JELLY TOPICAL
Status: DISPENSED
Start: 2019-10-03

## (undated) RX ORDER — HEPARIN SODIUM 200 [USP'U]/100ML
INJECTION, SOLUTION INTRAVENOUS
Status: DISPENSED
Start: 2025-06-25

## (undated) RX ORDER — PROPOFOL 10 MG/ML
INJECTION, EMULSION INTRAVENOUS
Status: DISPENSED
Start: 2018-09-10

## (undated) RX ORDER — FENTANYL CITRATE 50 UG/ML
INJECTION, SOLUTION INTRAMUSCULAR; INTRAVENOUS
Status: DISPENSED
Start: 2021-09-01

## (undated) RX ORDER — FUROSEMIDE 10 MG/ML
INJECTION INTRAMUSCULAR; INTRAVENOUS
Status: DISPENSED
Start: 2017-12-13

## (undated) RX ORDER — HEPARIN SODIUM (PORCINE) LOCK FLUSH IV SOLN 100 UNIT/ML 100 UNIT/ML
SOLUTION INTRAVENOUS
Status: DISPENSED
Start: 2018-12-19

## (undated) RX ORDER — PROPOFOL 10 MG/ML
INJECTION, EMULSION INTRAVENOUS
Status: DISPENSED
Start: 2018-02-19

## (undated) RX ORDER — DEXAMETHASONE SODIUM PHOSPHATE 4 MG/ML
INJECTION, SOLUTION INTRA-ARTICULAR; INTRALESIONAL; INTRAMUSCULAR; INTRAVENOUS; SOFT TISSUE
Status: DISPENSED
Start: 2018-09-10

## (undated) RX ORDER — LIDOCAINE HYDROCHLORIDE 20 MG/ML
JELLY TOPICAL
Status: DISPENSED
Start: 2020-07-24

## (undated) RX ORDER — DEXAMETHASONE SODIUM PHOSPHATE 4 MG/ML
INJECTION, SOLUTION INTRA-ARTICULAR; INTRALESIONAL; INTRAMUSCULAR; INTRAVENOUS; SOFT TISSUE
Status: DISPENSED
Start: 2018-11-13

## (undated) RX ORDER — OXYMETAZOLINE HYDROCHLORIDE 0.05 G/100ML
SPRAY NASAL
Status: DISPENSED
Start: 2022-02-09

## (undated) RX ORDER — PHENYLEPHRINE HCL IN 0.9% NACL 1 MG/10 ML
SYRINGE (ML) INTRAVENOUS
Status: DISPENSED
Start: 2018-11-13

## (undated) RX ORDER — HEPARIN SODIUM 1000 [USP'U]/ML
INJECTION, SOLUTION INTRAVENOUS; SUBCUTANEOUS
Status: DISPENSED
Start: 2017-12-13

## (undated) RX ORDER — PROPOFOL 10 MG/ML
INJECTION, EMULSION INTRAVENOUS
Status: DISPENSED
Start: 2018-04-03

## (undated) RX ORDER — ONDANSETRON 2 MG/ML
INJECTION INTRAMUSCULAR; INTRAVENOUS
Status: DISPENSED
Start: 2018-02-19

## (undated) RX ORDER — FENTANYL CITRATE 50 UG/ML
INJECTION, SOLUTION INTRAMUSCULAR; INTRAVENOUS
Status: DISPENSED
Start: 2017-12-13

## (undated) RX ORDER — FENTANYL CITRATE-0.9 % NACL/PF 10 MCG/ML
PLASTIC BAG, INJECTION (ML) INTRAVENOUS
Status: DISPENSED
Start: 2020-11-09

## (undated) RX ORDER — DIPHENHYDRAMINE HYDROCHLORIDE 50 MG/ML
INJECTION, SOLUTION INTRAMUSCULAR; INTRAVENOUS
Status: DISPENSED
Start: 2025-07-24

## (undated) RX ORDER — FENTANYL CITRATE 50 UG/ML
INJECTION, SOLUTION INTRAMUSCULAR; INTRAVENOUS
Status: DISPENSED
Start: 2025-07-24

## (undated) RX ORDER — EPHEDRINE SULFATE 50 MG/ML
INJECTION, SOLUTION INTRAMUSCULAR; INTRAVENOUS; SUBCUTANEOUS
Status: DISPENSED
Start: 2018-09-10

## (undated) RX ORDER — HEPARIN SODIUM 200 [USP'U]/100ML
INJECTION, SOLUTION INTRAVENOUS
Status: DISPENSED
Start: 2025-06-26

## (undated) RX ORDER — HYDROMORPHONE HCL IN WATER/PF 6 MG/30 ML
PATIENT CONTROLLED ANALGESIA SYRINGE INTRAVENOUS
Status: DISPENSED
Start: 2020-11-09

## (undated) RX ORDER — CEFAZOLIN SODIUM 1 G/3ML
INJECTION, POWDER, FOR SOLUTION INTRAMUSCULAR; INTRAVENOUS
Status: DISPENSED
Start: 2018-09-10

## (undated) RX ORDER — ACETAMINOPHEN 325 MG/1
TABLET ORAL
Status: DISPENSED
Start: 2020-01-13

## (undated) RX ORDER — FENTANYL CITRATE 50 UG/ML
INJECTION, SOLUTION INTRAMUSCULAR; INTRAVENOUS
Status: DISPENSED
Start: 2018-01-16

## (undated) RX ORDER — LIDOCAINE HYDROCHLORIDE 20 MG/ML
INJECTION, SOLUTION EPIDURAL; INFILTRATION; INTRACAUDAL; PERINEURAL
Status: DISPENSED
Start: 2018-04-03

## (undated) RX ORDER — FENTANYL CITRATE 50 UG/ML
INJECTION, SOLUTION INTRAMUSCULAR; INTRAVENOUS
Status: DISPENSED
Start: 2025-06-03

## (undated) RX ORDER — LIDOCAINE HYDROCHLORIDE 20 MG/ML
JELLY TOPICAL
Status: DISPENSED
Start: 2023-02-10

## (undated) RX ORDER — GLYCOPYRROLATE 0.2 MG/ML
INJECTION, SOLUTION INTRAMUSCULAR; INTRAVENOUS
Status: DISPENSED
Start: 2018-02-19

## (undated) RX ORDER — ESMOLOL HYDROCHLORIDE 10 MG/ML
INJECTION INTRAVENOUS
Status: DISPENSED
Start: 2018-11-13

## (undated) RX ORDER — LIDOCAINE HYDROCHLORIDE 10 MG/ML
INJECTION, SOLUTION EPIDURAL; INFILTRATION; INTRACAUDAL; PERINEURAL
Status: DISPENSED
Start: 2018-09-10

## (undated) RX ORDER — LIDOCAINE HYDROCHLORIDE 20 MG/ML
JELLY TOPICAL
Status: DISPENSED
Start: 2021-04-30

## (undated) RX ORDER — MINERAL OIL
OIL (ML) MISCELLANEOUS
Status: DISPENSED
Start: 2018-11-13

## (undated) RX ORDER — FENTANYL CITRATE 50 UG/ML
INJECTION, SOLUTION INTRAMUSCULAR; INTRAVENOUS
Status: DISPENSED
Start: 2020-11-09

## (undated) RX ORDER — CEFAZOLIN SODIUM 2 G/50ML
SOLUTION INTRAVENOUS
Status: DISPENSED
Start: 2020-01-13

## (undated) RX ORDER — LIDOCAINE HYDROCHLORIDE 20 MG/ML
INJECTION, SOLUTION EPIDURAL; INFILTRATION; INTRACAUDAL; PERINEURAL
Status: DISPENSED
Start: 2020-01-13

## (undated) RX ORDER — EPHEDRINE SULFATE 50 MG/ML
INJECTION, SOLUTION INTRAMUSCULAR; INTRAVENOUS; SUBCUTANEOUS
Status: DISPENSED
Start: 2020-08-26

## (undated) RX ORDER — PROPOFOL 10 MG/ML
INJECTION, EMULSION INTRAVENOUS
Status: DISPENSED
Start: 2020-08-26

## (undated) RX ORDER — LIDOCAINE HYDROCHLORIDE 20 MG/ML
JELLY TOPICAL
Status: DISPENSED
Start: 2021-01-22

## (undated) RX ORDER — CEFAZOLIN SODIUM 2 G/100ML
INJECTION, SOLUTION INTRAVENOUS
Status: DISPENSED
Start: 2020-08-26

## (undated) RX ORDER — LIDOCAINE HYDROCHLORIDE 20 MG/ML
JELLY TOPICAL
Status: DISPENSED
Start: 2020-04-30